# Patient Record
Sex: FEMALE | Race: WHITE | NOT HISPANIC OR LATINO | Employment: OTHER | URBAN - METROPOLITAN AREA
[De-identification: names, ages, dates, MRNs, and addresses within clinical notes are randomized per-mention and may not be internally consistent; named-entity substitution may affect disease eponyms.]

---

## 2023-02-20 LAB
ALANINE AMINOTRANSFERASE (SGPT) (U/L) IN SER/PLAS: 13 U/L (ref 7–45)
ALBUMIN (G/DL) IN SER/PLAS: 4.2 G/DL (ref 3.4–5)
ALKALINE PHOSPHATASE (U/L) IN SER/PLAS: 108 U/L (ref 33–136)
ANION GAP IN SER/PLAS: 11 MMOL/L (ref 10–20)
ASPARTATE AMINOTRANSFERASE (SGOT) (U/L) IN SER/PLAS: 12 U/L (ref 9–39)
BILIRUBIN DIRECT (MG/DL) IN SER/PLAS: 0.1 MG/DL (ref 0–0.3)
BILIRUBIN TOTAL (MG/DL) IN SER/PLAS: 0.5 MG/DL (ref 0–1.2)
CALCIUM (MG/DL) IN SER/PLAS: 9.8 MG/DL (ref 8.6–10.3)
CARBON DIOXIDE, TOTAL (MMOL/L) IN SER/PLAS: 24 MMOL/L (ref 21–32)
CHLORIDE (MMOL/L) IN SER/PLAS: 106 MMOL/L (ref 98–107)
CREATININE (MG/DL) IN SER/PLAS: 2.8 MG/DL (ref 0.5–1.05)
GFR FEMALE: 17 ML/MIN/1.73M2
GLUCOSE (MG/DL) IN SER/PLAS: 136 MG/DL (ref 74–99)
MAGNESIUM (MG/DL) IN SER/PLAS: 1.8 MG/DL (ref 1.6–2.4)
NATRIURETIC PEPTIDE B (PG/ML) IN SER/PLAS: 205 PG/ML (ref 0–99)
POTASSIUM (MMOL/L) IN SER/PLAS: 4.5 MMOL/L (ref 3.5–5.3)
PROTEIN TOTAL: 6.7 G/DL (ref 6.4–8.2)
SODIUM (MMOL/L) IN SER/PLAS: 136 MMOL/L (ref 136–145)
UREA NITROGEN (MG/DL) IN SER/PLAS: 54 MG/DL (ref 6–23)

## 2023-02-21 LAB — COBALAMIN (VITAMIN B12) (PG/ML) IN SER/PLAS: 294 PG/ML (ref 211–911)

## 2023-05-04 PROBLEM — K21.9 GERD (GASTROESOPHAGEAL REFLUX DISEASE): Status: ACTIVE | Noted: 2023-05-04

## 2023-05-04 PROBLEM — R06.02 SOB (SHORTNESS OF BREATH): Status: ACTIVE | Noted: 2023-05-04

## 2023-05-04 PROBLEM — I48.91 A-FIB (MULTI): Status: ACTIVE | Noted: 2023-05-04

## 2023-05-04 PROBLEM — M25.69 BACK STIFFNESS: Status: ACTIVE | Noted: 2023-05-04

## 2023-05-04 PROBLEM — N18.9 CKD (CHRONIC KIDNEY DISEASE): Status: ACTIVE | Noted: 2023-05-04

## 2023-05-04 PROBLEM — I25.10 CORONARY ARTERY DISEASE: Status: ACTIVE | Noted: 2023-05-04

## 2023-05-04 PROBLEM — M54.50 ACUTE EXACERBATION OF CHRONIC LOW BACK PAIN: Status: ACTIVE | Noted: 2023-05-04

## 2023-05-04 PROBLEM — M47.812 CERVICAL SPONDYLOSIS WITHOUT MYELOPATHY: Status: ACTIVE | Noted: 2023-05-04

## 2023-05-04 PROBLEM — D46.9 MYELODYSPLASTIC SYNDROME (MULTI): Status: ACTIVE | Noted: 2023-05-04

## 2023-05-04 PROBLEM — M51.369 DEGENERATION OF INTERVERTEBRAL DISC OF LUMBAR REGION: Status: ACTIVE | Noted: 2023-05-04

## 2023-05-04 PROBLEM — I50.32 CHRONIC DIASTOLIC HEART FAILURE OF UNKNOWN ETIOLOGY (MULTI): Status: ACTIVE | Noted: 2023-05-04

## 2023-05-04 PROBLEM — F41.9 ANXIETY: Status: ACTIVE | Noted: 2023-05-04

## 2023-05-04 PROBLEM — R26.2 INABILITY TO AMBULATE DUE TO MULTIPLE JOINTS: Status: ACTIVE | Noted: 2023-05-04

## 2023-05-04 PROBLEM — M43.26 FUSION OF LUMBAR SPINE: Status: ACTIVE | Noted: 2023-05-04

## 2023-05-04 PROBLEM — R25.1 OCCASIONAL TREMORS: Status: ACTIVE | Noted: 2023-05-04

## 2023-05-04 PROBLEM — E11.9 CONTROLLED TYPE 2 DIABETES MELLITUS (MULTI): Status: ACTIVE | Noted: 2023-05-04

## 2023-05-04 PROBLEM — G89.29 ACUTE EXACERBATION OF CHRONIC LOW BACK PAIN: Status: ACTIVE | Noted: 2023-05-04

## 2023-05-04 PROBLEM — R29.6 FREQUENT FALLS: Status: ACTIVE | Noted: 2023-05-04

## 2023-05-04 PROBLEM — M41.26 OTHER IDIOPATHIC SCOLIOSIS, LUMBAR REGION: Status: ACTIVE | Noted: 2023-05-04

## 2023-05-04 PROBLEM — M47.816 LUMBAR SPONDYLOSIS: Status: ACTIVE | Noted: 2023-05-04

## 2023-05-04 PROBLEM — M79.18 MYOFASCIAL PAIN SYNDROME: Status: ACTIVE | Noted: 2023-05-04

## 2023-05-04 PROBLEM — L97.529 ULCER OF LEFT FOOT (MULTI): Status: ACTIVE | Noted: 2023-05-04

## 2023-05-04 PROBLEM — G45.9 TIA (TRANSIENT ISCHEMIC ATTACK): Status: ACTIVE | Noted: 2023-05-04

## 2023-05-04 PROBLEM — Z86.0100 HISTORY OF COLON POLYPS: Status: ACTIVE | Noted: 2023-05-04

## 2023-05-04 PROBLEM — R25.2 JERKING MOVEMENTS OF EXTREMITIES: Status: ACTIVE | Noted: 2023-05-04

## 2023-05-04 PROBLEM — E78.5 HYPERLIPIDEMIA: Status: ACTIVE | Noted: 2023-05-04

## 2023-05-04 PROBLEM — D64.9 ANEMIA: Status: ACTIVE | Noted: 2023-05-04

## 2023-05-04 PROBLEM — Z86.010 HISTORY OF COLON POLYPS: Status: ACTIVE | Noted: 2023-05-04

## 2023-05-04 PROBLEM — J96.91 RESPIRATORY FAILURE WITH HYPOXIA (MULTI): Status: ACTIVE | Noted: 2023-05-04

## 2023-05-04 PROBLEM — R27.0 ATAXIA: Status: ACTIVE | Noted: 2023-05-04

## 2023-05-04 PROBLEM — I10 ESSENTIAL HYPERTENSION: Status: ACTIVE | Noted: 2023-05-04

## 2023-05-04 PROBLEM — M51.36 DEGENERATION OF INTERVERTEBRAL DISC OF LUMBAR REGION: Status: ACTIVE | Noted: 2023-05-04

## 2023-05-04 PROBLEM — M81.0 OSTEOPOROSIS: Status: ACTIVE | Noted: 2023-05-04

## 2023-05-04 PROBLEM — J30.9 ALLERGIC RHINITIS: Status: ACTIVE | Noted: 2023-05-04

## 2023-05-04 PROBLEM — F39 MOOD DISORDER (CMS-HCC): Status: ACTIVE | Noted: 2023-05-04

## 2023-05-04 PROBLEM — R53.83 FATIGUE: Status: ACTIVE | Noted: 2023-05-04

## 2023-05-04 RX ORDER — BUMETANIDE 1 MG/1
1 TABLET ORAL DAILY
COMMUNITY
Start: 2022-12-15 | End: 2023-05-27 | Stop reason: ALTCHOICE

## 2023-05-04 RX ORDER — ACETAMINOPHEN 500 MG
1 TABLET ORAL EVERY 8 HOURS PRN
COMMUNITY
Start: 2022-06-30 | End: 2024-03-19 | Stop reason: ALTCHOICE

## 2023-05-04 RX ORDER — METOPROLOL TARTRATE 25 MG/1
TABLET, FILM COATED ORAL
COMMUNITY
Start: 2022-04-10 | End: 2024-02-18 | Stop reason: HOSPADM

## 2023-05-04 RX ORDER — GLIMEPIRIDE 4 MG/1
1 TABLET ORAL DAILY
Status: ON HOLD | COMMUNITY
Start: 2019-12-13 | End: 2024-02-08 | Stop reason: ENTERED-IN-ERROR

## 2023-05-04 RX ORDER — PREGABALIN 100 MG/1
1 CAPSULE ORAL 2 TIMES DAILY
COMMUNITY
Start: 2019-12-13 | End: 2023-06-23 | Stop reason: SDUPTHER

## 2023-05-04 RX ORDER — ALBUTEROL SULFATE 90 UG/1
AEROSOL, METERED RESPIRATORY (INHALATION) 4 TIMES DAILY PRN
Status: ON HOLD | COMMUNITY
End: 2024-02-08 | Stop reason: ENTERED-IN-ERROR

## 2023-05-04 RX ORDER — FLUTICASONE PROPIONATE 50 MCG
1 SPRAY, SUSPENSION (ML) NASAL AS NEEDED
COMMUNITY
End: 2024-02-26 | Stop reason: ENTERED-IN-ERROR

## 2023-05-04 RX ORDER — LIDOCAINE 50 MG/G
OINTMENT TOPICAL 2 TIMES DAILY
COMMUNITY
End: 2023-10-10 | Stop reason: ALTCHOICE

## 2023-05-04 RX ORDER — PRAVASTATIN SODIUM 40 MG/1
TABLET ORAL
COMMUNITY
Start: 2019-12-13 | End: 2023-10-01 | Stop reason: SDUPTHER

## 2023-05-04 RX ORDER — FUROSEMIDE 20 MG/1
1 TABLET ORAL
COMMUNITY
Start: 2019-12-13 | End: 2023-05-27 | Stop reason: ALTCHOICE

## 2023-05-04 RX ORDER — ASPIRIN 81 MG/1
1 TABLET ORAL DAILY
COMMUNITY
Start: 2019-12-13 | End: 2024-03-05 | Stop reason: HOSPADM

## 2023-05-04 RX ORDER — PANTOPRAZOLE SODIUM 40 MG/1
1 TABLET, DELAYED RELEASE ORAL DAILY
Status: ON HOLD | COMMUNITY
Start: 2019-12-13 | End: 2024-02-08 | Stop reason: ENTERED-IN-ERROR

## 2023-05-04 RX ORDER — PIOGLITAZONEHYDROCHLORIDE 15 MG/1
1 TABLET ORAL DAILY
COMMUNITY
Start: 2019-12-13 | End: 2023-10-01 | Stop reason: SDUPTHER

## 2023-05-04 RX ORDER — AMLODIPINE BESYLATE 5 MG/1
1 TABLET ORAL DAILY
COMMUNITY
Start: 2022-08-08 | End: 2023-05-25 | Stop reason: ALTCHOICE

## 2023-05-04 RX ORDER — ALLOPURINOL 100 MG/1
TABLET ORAL 2 TIMES DAILY
COMMUNITY
End: 2023-06-23 | Stop reason: SDUPTHER

## 2023-05-04 RX ORDER — CHOLECALCIFEROL (VITAMIN D3) 50 MCG
1 TABLET ORAL DAILY
Status: ON HOLD | COMMUNITY
Start: 2019-12-13

## 2023-05-04 RX ORDER — LORAZEPAM 0.5 MG/1
TABLET ORAL 2 TIMES DAILY
COMMUNITY
Start: 2019-12-13 | End: 2023-05-25 | Stop reason: ALTCHOICE

## 2023-05-19 LAB
ALANINE AMINOTRANSFERASE (SGPT) (U/L) IN SER/PLAS: 11 U/L (ref 7–45)
ALBUMIN (G/DL) IN SER/PLAS: 4.2 G/DL (ref 3.4–5)
ALKALINE PHOSPHATASE (U/L) IN SER/PLAS: 64 U/L (ref 33–136)
ANION GAP IN SER/PLAS: 11 MMOL/L (ref 10–20)
ASPARTATE AMINOTRANSFERASE (SGOT) (U/L) IN SER/PLAS: 14 U/L (ref 9–39)
BILIRUBIN TOTAL (MG/DL) IN SER/PLAS: 0.5 MG/DL (ref 0–1.2)
CALCIUM (MG/DL) IN SER/PLAS: 9.9 MG/DL (ref 8.6–10.3)
CARBON DIOXIDE, TOTAL (MMOL/L) IN SER/PLAS: 23 MMOL/L (ref 21–32)
CHLORIDE (MMOL/L) IN SER/PLAS: 111 MMOL/L (ref 98–107)
CHOLESTEROL (MG/DL) IN SER/PLAS: 117 MG/DL (ref 0–199)
CHOLESTEROL IN HDL (MG/DL) IN SER/PLAS: 45.5 MG/DL
CHOLESTEROL/HDL RATIO: 2.6
CREATININE (MG/DL) IN SER/PLAS: 2.41 MG/DL (ref 0.5–1.05)
GFR FEMALE: 20 ML/MIN/1.73M2
GLUCOSE (MG/DL) IN SER/PLAS: 58 MG/DL (ref 74–99)
LDL: 57 MG/DL (ref 0–99)
POTASSIUM (MMOL/L) IN SER/PLAS: 5.3 MMOL/L (ref 3.5–5.3)
PROTEIN TOTAL: 6.6 G/DL (ref 6.4–8.2)
SODIUM (MMOL/L) IN SER/PLAS: 140 MMOL/L (ref 136–145)
TRIGLYCERIDE (MG/DL) IN SER/PLAS: 75 MG/DL (ref 0–149)
UREA NITROGEN (MG/DL) IN SER/PLAS: 40 MG/DL (ref 6–23)
VLDL: 15 MG/DL (ref 0–40)

## 2023-05-25 ENCOUNTER — TELEPHONE (OUTPATIENT)
Dept: PRIMARY CARE | Facility: CLINIC | Age: 78
End: 2023-05-25

## 2023-05-25 ENCOUNTER — OFFICE VISIT (OUTPATIENT)
Dept: PRIMARY CARE | Facility: CLINIC | Age: 78
End: 2023-05-25
Payer: MEDICARE

## 2023-05-25 VITALS
OXYGEN SATURATION: 100 % | SYSTOLIC BLOOD PRESSURE: 147 MMHG | WEIGHT: 171 LBS | HEART RATE: 66 BPM | DIASTOLIC BLOOD PRESSURE: 72 MMHG | HEIGHT: 61 IN | BODY MASS INDEX: 32.28 KG/M2

## 2023-05-25 DIAGNOSIS — N18.4 STAGE 4 CHRONIC KIDNEY DISEASE (MULTI): ICD-10-CM

## 2023-05-25 DIAGNOSIS — I10 ESSENTIAL HYPERTENSION: ICD-10-CM

## 2023-05-25 DIAGNOSIS — M51.36 DEGENERATION OF INTERVERTEBRAL DISC OF LUMBAR REGION: ICD-10-CM

## 2023-05-25 DIAGNOSIS — R39.15 URINARY URGENCY: Primary | ICD-10-CM

## 2023-05-25 PROBLEM — N17.9 AKI (ACUTE KIDNEY INJURY) (CMS-HCC): Status: ACTIVE | Noted: 2021-03-05

## 2023-05-25 PROBLEM — R53.1 WEAKNESS GENERALIZED: Status: ACTIVE | Noted: 2023-05-04

## 2023-05-25 PROBLEM — R32 URINARY INCONTINENCE: Status: ACTIVE | Noted: 2023-05-25

## 2023-05-25 PROBLEM — M41.56 SCOLIOSIS OF LUMBAR REGION DUE TO DEGENERATIVE DISEASE OF SPINE IN ADULT: Status: ACTIVE | Noted: 2021-03-04

## 2023-05-25 PROBLEM — M10.9 GOUT: Status: ACTIVE | Noted: 2023-05-25

## 2023-05-25 PROBLEM — F32.A DEPRESSION: Status: ACTIVE | Noted: 2023-05-25

## 2023-05-25 PROBLEM — D53.9 MACROCYTIC ANEMIA: Status: ACTIVE | Noted: 2023-05-04

## 2023-05-25 PROBLEM — E11.9 TYPE 2 DIABETES MELLITUS (MULTI): Status: ACTIVE | Noted: 2021-03-05

## 2023-05-25 PROBLEM — E53.8 VITAMIN B12 DEFICIENCY: Status: ACTIVE | Noted: 2023-05-25

## 2023-05-25 PROBLEM — M54.2 NECK PAIN: Status: ACTIVE | Noted: 2023-05-25

## 2023-05-25 PROBLEM — M48.061 SPINAL STENOSIS OF LUMBAR REGION: Status: ACTIVE | Noted: 2021-03-10

## 2023-05-25 LAB
POC APPEARANCE, URINE: CLEAR
POC BILIRUBIN, URINE: ABNORMAL
POC BLOOD, URINE: ABNORMAL
POC COLOR, URINE: YELLOW
POC GLUCOSE, URINE: NEGATIVE MG/DL
POC KETONES, URINE: NEGATIVE MG/DL
POC LEUKOCYTES, URINE: ABNORMAL
POC NITRITE,URINE: POSITIVE
POC PH, URINE: 5.5 PH
POC PROTEIN, URINE: ABNORMAL MG/DL
POC SPECIFIC GRAVITY, URINE: 1.02
POC UROBILINOGEN, URINE: 0.2 EU/DL

## 2023-05-25 PROCEDURE — 3078F DIAST BP <80 MM HG: CPT | Performed by: STUDENT IN AN ORGANIZED HEALTH CARE EDUCATION/TRAINING PROGRAM

## 2023-05-25 PROCEDURE — 99214 OFFICE O/P EST MOD 30 MIN: CPT | Performed by: STUDENT IN AN ORGANIZED HEALTH CARE EDUCATION/TRAINING PROGRAM

## 2023-05-25 PROCEDURE — 87086 URINE CULTURE/COLONY COUNT: CPT

## 2023-05-25 PROCEDURE — 1159F MED LIST DOCD IN RCRD: CPT | Performed by: STUDENT IN AN ORGANIZED HEALTH CARE EDUCATION/TRAINING PROGRAM

## 2023-05-25 PROCEDURE — 87186 SC STD MICRODIL/AGAR DIL: CPT

## 2023-05-25 PROCEDURE — 1160F RVW MEDS BY RX/DR IN RCRD: CPT | Performed by: STUDENT IN AN ORGANIZED HEALTH CARE EDUCATION/TRAINING PROGRAM

## 2023-05-25 PROCEDURE — 3077F SYST BP >= 140 MM HG: CPT | Performed by: STUDENT IN AN ORGANIZED HEALTH CARE EDUCATION/TRAINING PROGRAM

## 2023-05-25 PROCEDURE — 87077 CULTURE AEROBIC IDENTIFY: CPT

## 2023-05-25 PROCEDURE — 81002 URINALYSIS NONAUTO W/O SCOPE: CPT | Performed by: STUDENT IN AN ORGANIZED HEALTH CARE EDUCATION/TRAINING PROGRAM

## 2023-05-25 PROCEDURE — 1036F TOBACCO NON-USER: CPT | Performed by: STUDENT IN AN ORGANIZED HEALTH CARE EDUCATION/TRAINING PROGRAM

## 2023-05-25 PROCEDURE — 1157F ADVNC CARE PLAN IN RCRD: CPT | Performed by: STUDENT IN AN ORGANIZED HEALTH CARE EDUCATION/TRAINING PROGRAM

## 2023-05-25 RX ORDER — CIPROFLOXACIN 500 MG/1
500 TABLET ORAL DAILY
Qty: 3 TABLET | Refills: 0 | Status: SHIPPED | OUTPATIENT
Start: 2023-05-25 | End: 2023-05-28

## 2023-05-25 RX ORDER — LANSOPRAZOLE 30 MG/1
30 CAPSULE, DELAYED RELEASE ORAL DAILY
COMMUNITY
End: 2023-05-27 | Stop reason: ALTCHOICE

## 2023-05-25 RX ORDER — LISINOPRIL 20 MG/1
20 TABLET ORAL
COMMUNITY
End: 2023-05-27 | Stop reason: ALTCHOICE

## 2023-05-25 RX ORDER — HYDROCHLOROTHIAZIDE 25 MG/1
1 TABLET ORAL
COMMUNITY
End: 2023-05-27 | Stop reason: ALTCHOICE

## 2023-05-25 RX ORDER — LANOLIN ALCOHOL/MO/W.PET/CERES
1 CREAM (GRAM) TOPICAL
COMMUNITY
Start: 2017-06-11 | End: 2023-05-27 | Stop reason: ALTCHOICE

## 2023-05-25 RX ORDER — HYDROCHLOROTHIAZIDE 25 MG/1
25 TABLET ORAL DAILY
COMMUNITY
End: 2023-05-25 | Stop reason: ALTCHOICE

## 2023-05-25 RX ORDER — SIMVASTATIN 20 MG/1
20 TABLET, FILM COATED ORAL
COMMUNITY
End: 2023-05-27 | Stop reason: ALTCHOICE

## 2023-05-25 RX ORDER — TRAMADOL HYDROCHLORIDE 50 MG/1
TABLET ORAL 4 TIMES DAILY
COMMUNITY
Start: 2023-02-02 | End: 2023-05-25 | Stop reason: ALTCHOICE

## 2023-05-25 RX ORDER — LISINOPRIL 40 MG/1
40 TABLET ORAL DAILY
COMMUNITY
Start: 2017-04-23 | End: 2023-05-27 | Stop reason: ALTCHOICE

## 2023-05-25 RX ORDER — ERYTHROPOIETIN 10000 [IU]/ML
INJECTION, SOLUTION INTRAVENOUS; SUBCUTANEOUS
COMMUNITY
Start: 2019-12-13 | End: 2024-04-16 | Stop reason: ALTCHOICE

## 2023-05-25 RX ORDER — INSULIN GLARGINE 100 [IU]/ML
6 INJECTION, SOLUTION SUBCUTANEOUS
COMMUNITY
Start: 2021-03-10 | End: 2023-05-25 | Stop reason: ALTCHOICE

## 2023-05-25 RX ORDER — LANOLIN ALCOHOL/MO/W.PET/CERES
1 CREAM (GRAM) TOPICAL DAILY
Status: ON HOLD | COMMUNITY
Start: 2023-02-22

## 2023-05-25 RX ORDER — CARBOXYMETHYLCELLULOSE SODIUM 10 MG/ML
1 GEL OPHTHALMIC
COMMUNITY
End: 2023-05-25 | Stop reason: ALTCHOICE

## 2023-05-25 RX ORDER — AMLODIPINE AND BENAZEPRIL HYDROCHLORIDE 10; 40 MG/1; MG/1
1 CAPSULE ORAL
COMMUNITY
End: 2023-07-20 | Stop reason: ALTCHOICE

## 2023-05-25 SDOH — ECONOMIC STABILITY: FOOD INSECURITY: WITHIN THE PAST 12 MONTHS, THE FOOD YOU BOUGHT JUST DIDN'T LAST AND YOU DIDN'T HAVE MONEY TO GET MORE.: NEVER TRUE

## 2023-05-25 SDOH — ECONOMIC STABILITY: FOOD INSECURITY: WITHIN THE PAST 12 MONTHS, YOU WORRIED THAT YOUR FOOD WOULD RUN OUT BEFORE YOU GOT MONEY TO BUY MORE.: NEVER TRUE

## 2023-05-25 ASSESSMENT — ENCOUNTER SYMPTOMS
DEPRESSION: 1
OCCASIONAL FEELINGS OF UNSTEADINESS: 1
LOSS OF SENSATION IN FEET: 1

## 2023-05-25 ASSESSMENT — PATIENT HEALTH QUESTIONNAIRE - PHQ9
10. IF YOU CHECKED OFF ANY PROBLEMS, HOW DIFFICULT HAVE THESE PROBLEMS MADE IT FOR YOU TO DO YOUR WORK, TAKE CARE OF THINGS AT HOME, OR GET ALONG WITH OTHER PEOPLE: SOMEWHAT DIFFICULT
2. FEELING DOWN, DEPRESSED OR HOPELESS: SEVERAL DAYS
1. LITTLE INTEREST OR PLEASURE IN DOING THINGS: SEVERAL DAYS
SUM OF ALL RESPONSES TO PHQ9 QUESTIONS 1 & 2: 2

## 2023-05-25 ASSESSMENT — LIFESTYLE VARIABLES: HOW MANY STANDARD DRINKS CONTAINING ALCOHOL DO YOU HAVE ON A TYPICAL DAY: PATIENT DOES NOT DRINK

## 2023-05-25 NOTE — PROGRESS NOTES
"Subjective   Patient ID: Tana Hargrove is a 78 y.o. female who presents for New Patient Visit (Former BP pt and saw Dr Pedroza once ), Follow-up (3 month follow up), Pain (PT states she hurts all over, broke her femur about a year ago, feels it never healed.  Excruciating pain.  ), and OTHER (Pt lost , son and son in law all in the last 3 years, has no interest in doing things).      HPI   [facility] and also with few complaints.   Reports she is having some urinary urgency x 2-3 mo now and that she cannot make it on time, having accidents. Denies hematuria, flank pain, fever/chills and other asso sx.   Also having R leg weakness from knee below; started after she broke her femur a yr ago. She follows with ortho, last seen 02/23; and was rec to FU in 3 mo; doesn't have appt yet. Reports otherwise she is doing well; reports UTD on refills for now.     Review of Systems   Constitutional:  Negative for chills, fatigue, fever and unexpected weight change.   HENT: Negative.     Respiratory:  Negative for cough, shortness of breath and wheezing.    Cardiovascular:  Negative for chest pain, palpitations and leg swelling.   Gastrointestinal:  Negative for abdominal pain, constipation, diarrhea, nausea and vomiting.   Genitourinary:  Positive for urgency.   Musculoskeletal: Negative.    Skin:  Negative for color change and rash.   Neurological:  Negative for dizziness and headaches.   Psychiatric/Behavioral:  Negative for behavioral problems and confusion.        Objective   /72 (BP Location: Left arm, Patient Position: Sitting, BP Cuff Size: Large adult)   Pulse 66   Ht 1.549 m (5' 1\")   Wt 77.6 kg (171 lb)   SpO2 100%   BMI 32.31 kg/m²     Physical Exam  Vitals and nursing note reviewed.   Constitutional:       Appearance: Normal appearance. She is obese.   Cardiovascular:      Rate and Rhythm: Normal rate and regular rhythm.      Pulses: Normal pulses.      Heart sounds: Normal heart sounds. "   Pulmonary:      Effort: Pulmonary effort is normal. No respiratory distress.      Breath sounds: Normal breath sounds.   Abdominal:      General: Abdomen is flat. Bowel sounds are normal.      Palpations: Abdomen is soft.   Musculoskeletal:         General: Normal range of motion.   Neurological:      General: No focal deficit present.      Mental Status: She is alert and oriented to person, place, and time.   Psychiatric:         Mood and Affect: Mood normal.         Behavior: Behavior normal.       Assessment/Plan   She is here to [facility] care and also for FU visit. She is having urinary urgency x 1-2 mo and UA io pos for leuks & nitrite, c/f UTI. We will start empiric abx, cipro 500 mg x 3 days (off note: has worsening renal function, current CrCl 23). She is having intermittent/continuous hip pain post fx, re to [facility] as rec. We will help to marita appt; Others UTD on refills, will call for refills as needed. Also we will call her pharmacy to verify meds. Otherwise she is clinically & vitally stable.   Problem List Items Addressed This Visit          Circulatory    Essential hypertension       Genitourinary    Stage 4 chronic kidney disease (CMS/HCC)       Musculoskeletal    Degeneration of intervertebral disc of lumbar region     Other Visit Diagnoses       Urinary urgency    -  Primary    Relevant Medications    ciprofloxacin (Cipro) 500 mg tablet    Other Relevant Orders    POCT UA (nonautomated) manually resulted (Completed)    Urine Culture          Rtc 3 mo for MCR/FU.    Carlos Higgins MD    [facility]

## 2023-05-25 NOTE — TELEPHONE ENCOUNTER
Med list info for next visit:  Dr Richmond and Yovany rx'd:  Allopurinol  Actos  Amaryl  Lyrica  Vit b12  Pantoprozole  Januvia    Dr Callaway:  Metoprolol  Pravastatin  Amoldipine

## 2023-05-27 ASSESSMENT — ENCOUNTER SYMPTOMS
ABDOMINAL PAIN: 0
DIZZINESS: 0
CONSTIPATION: 0
UNEXPECTED WEIGHT CHANGE: 0
DIARRHEA: 0
FATIGUE: 0
COUGH: 0
WHEEZING: 0
FEVER: 0
VOMITING: 0
MUSCULOSKELETAL NEGATIVE: 1
NAUSEA: 0
PALPITATIONS: 0
SHORTNESS OF BREATH: 0
CONFUSION: 0
CHILLS: 0
HEADACHES: 0
COLOR CHANGE: 0

## 2023-05-28 LAB — URINE CULTURE: ABNORMAL

## 2023-06-05 ENCOUNTER — TELEPHONE (OUTPATIENT)
Dept: PRIMARY CARE | Facility: CLINIC | Age: 78
End: 2023-06-05
Payer: MEDICARE

## 2023-06-05 NOTE — TELEPHONE ENCOUNTER
Pt is changing to [facility].  They sent a fax to go over medication.  Pt previously saw you on 05/25/23 and has a follow up on 08/31/23.  The fax is in your box.  Thanks:)

## 2023-06-06 NOTE — TELEPHONE ENCOUNTER
This is a scam.  This is the second time they have requested refills and I have called pt and confirmed.  Do not fill.

## 2023-06-19 ENCOUNTER — TELEPHONE (OUTPATIENT)
Dept: PRIMARY CARE | Facility: CLINIC | Age: 78
End: 2023-06-19
Payer: MEDICARE

## 2023-06-23 DIAGNOSIS — M10.9 GOUT, UNSPECIFIED CAUSE, UNSPECIFIED CHRONICITY, UNSPECIFIED SITE: ICD-10-CM

## 2023-06-23 DIAGNOSIS — M79.18 MYOFASCIAL PAIN SYNDROME: Primary | ICD-10-CM

## 2023-06-23 RX ORDER — DOXYCYCLINE HYCLATE 100 MG
TABLET ORAL
COMMUNITY
Start: 2023-03-31 | End: 2023-07-20 | Stop reason: ALTCHOICE

## 2023-06-23 RX ORDER — MUPIROCIN 20 MG/G
OINTMENT TOPICAL DAILY
COMMUNITY
Start: 2023-06-05 | End: 2023-10-10 | Stop reason: ALTCHOICE

## 2023-06-23 RX ORDER — AMLODIPINE BESYLATE 5 MG/1
1 TABLET ORAL DAILY
Status: ON HOLD | COMMUNITY
Start: 2022-08-08 | End: 2024-05-20 | Stop reason: ENTERED-IN-ERROR

## 2023-06-23 NOTE — TELEPHONE ENCOUNTER
Pt calling in to request a refill on     Gabapentin   Allopurinol   Pregabalin     Pt's pharmacy is [facility]

## 2023-06-26 RX ORDER — PREGABALIN 100 MG/1
100 CAPSULE ORAL 2 TIMES DAILY
Qty: 60 CAPSULE | Refills: 1 | Status: SHIPPED | OUTPATIENT
Start: 2023-06-26 | End: 2023-08-16

## 2023-06-26 RX ORDER — ALLOPURINOL 100 MG/1
100 TABLET ORAL DAILY
Qty: 90 TABLET | Refills: 1 | Status: SHIPPED | OUTPATIENT
Start: 2023-06-26 | End: 2024-05-28 | Stop reason: HOSPADM

## 2023-07-12 ENCOUNTER — TELEPHONE (OUTPATIENT)
Dept: PRIMARY CARE | Facility: CLINIC | Age: 78
End: 2023-07-12
Payer: MEDICARE

## 2023-07-12 NOTE — TELEPHONE ENCOUNTER
[facility] called and said her bloodwork  came back with severely elevated potasium levels and sent in a script for Kayexalate for pt to . They wondered if you wanted to see her soon to check on these.     They are faxing us her labs, and I will scan them in.

## 2023-07-13 NOTE — TELEPHONE ENCOUNTER
She has an appt sched for 8/31. I can move her up to 8/14. Is it acceptable to move her, and for her to wait that long to be seen? Or should I just call Reza arsh back and ask them to continue the treatment?

## 2023-07-17 ENCOUNTER — TELEPHONE (OUTPATIENT)
Dept: PRIMARY CARE | Facility: CLINIC | Age: 78
End: 2023-07-17
Payer: MEDICARE

## 2023-07-17 NOTE — TELEPHONE ENCOUNTER
07/17/2023 05:41 PM EDT by Sue Krueger MA  Outgoing Tana Hargrove (Self) [phone] (Home) Remove   gave the pt the message and she said she understood every word

## 2023-07-20 ENCOUNTER — OFFICE VISIT (OUTPATIENT)
Dept: PRIMARY CARE | Facility: CLINIC | Age: 78
End: 2023-07-20
Payer: MEDICARE

## 2023-07-20 VITALS
TEMPERATURE: 97.1 F | SYSTOLIC BLOOD PRESSURE: 149 MMHG | WEIGHT: 178 LBS | BODY MASS INDEX: 33.63 KG/M2 | DIASTOLIC BLOOD PRESSURE: 68 MMHG | HEART RATE: 87 BPM | OXYGEN SATURATION: 92 %

## 2023-07-20 DIAGNOSIS — M79.89 PAIN AND SWELLING OF LOWER EXTREMITY, RIGHT: Primary | ICD-10-CM

## 2023-07-20 DIAGNOSIS — M79.604 PAIN AND SWELLING OF LOWER EXTREMITY, RIGHT: Primary | ICD-10-CM

## 2023-07-20 DIAGNOSIS — D64.9 ANEMIA, UNSPECIFIED TYPE: ICD-10-CM

## 2023-07-20 DIAGNOSIS — N18.4 STAGE 4 CHRONIC KIDNEY DISEASE (MULTI): ICD-10-CM

## 2023-07-20 DIAGNOSIS — R06.02 SOB (SHORTNESS OF BREATH): ICD-10-CM

## 2023-07-20 DIAGNOSIS — E11.9 TYPE 2 DIABETES MELLITUS WITHOUT COMPLICATION, WITHOUT LONG-TERM CURRENT USE OF INSULIN (MULTI): ICD-10-CM

## 2023-07-20 PROCEDURE — 1160F RVW MEDS BY RX/DR IN RCRD: CPT | Performed by: STUDENT IN AN ORGANIZED HEALTH CARE EDUCATION/TRAINING PROGRAM

## 2023-07-20 PROCEDURE — 3077F SYST BP >= 140 MM HG: CPT | Performed by: STUDENT IN AN ORGANIZED HEALTH CARE EDUCATION/TRAINING PROGRAM

## 2023-07-20 PROCEDURE — 3078F DIAST BP <80 MM HG: CPT | Performed by: STUDENT IN AN ORGANIZED HEALTH CARE EDUCATION/TRAINING PROGRAM

## 2023-07-20 PROCEDURE — 99215 OFFICE O/P EST HI 40 MIN: CPT | Performed by: STUDENT IN AN ORGANIZED HEALTH CARE EDUCATION/TRAINING PROGRAM

## 2023-07-20 PROCEDURE — 1157F ADVNC CARE PLAN IN RCRD: CPT | Performed by: STUDENT IN AN ORGANIZED HEALTH CARE EDUCATION/TRAINING PROGRAM

## 2023-07-20 PROCEDURE — 1036F TOBACCO NON-USER: CPT | Performed by: STUDENT IN AN ORGANIZED HEALTH CARE EDUCATION/TRAINING PROGRAM

## 2023-07-20 PROCEDURE — 1159F MED LIST DOCD IN RCRD: CPT | Performed by: STUDENT IN AN ORGANIZED HEALTH CARE EDUCATION/TRAINING PROGRAM

## 2023-07-20 ASSESSMENT — ENCOUNTER SYMPTOMS
MUSCULOSKELETAL NEGATIVE: 1
FATIGUE: 0
WHEEZING: 0
CONFUSION: 0
ABDOMINAL PAIN: 0
COLOR CHANGE: 0
PALPITATIONS: 0
HEADACHES: 0
UNEXPECTED WEIGHT CHANGE: 0
COUGH: 0
NAUSEA: 0
DIARRHEA: 0
SHORTNESS OF BREATH: 0
CONSTIPATION: 0
VOMITING: 0
FEVER: 0
DIZZINESS: 0
CHILLS: 0

## 2023-07-20 NOTE — PROGRESS NOTES
Subjective   Patient ID: Tana Hargrove is a 78 y.o. female who presents for Foot Swelling (Pt is here for both feet swelling, and thinks pretty much all over her body. The swelling started yesterday.).    HPI   She is here for FU visit and also c/o leg swelling. Reports she is having leg swelling/pain, R >> L leg  x 1 day. D-in law in the room mentioned its affecting her ability to walk. Off note She has low Hgb, 6.7 ()7/7/23) s/p 2 units of pRBCs. Reports her energy level is about the same. She denies CP/SOB, HA, palpitations, cough, lightheadedness and other asso sx.     Review of Systems   Constitutional:  Negative for chills, fatigue, fever and unexpected weight change.   HENT: Negative.     Respiratory:  Negative for cough, shortness of breath and wheezing.    Cardiovascular:  Negative for chest pain, palpitations and leg swelling.   Gastrointestinal:  Negative for abdominal pain, constipation, diarrhea, nausea and vomiting.   Musculoskeletal: Negative.    Skin:  Negative for color change and rash.   Neurological:  Negative for dizziness and headaches.   Psychiatric/Behavioral:  Negative for behavioral problems and confusion.        Objective   /68 (BP Location: Left arm, Patient Position: Sitting)   Pulse 87   Temp 36.2 °C (97.1 °F)   Wt 80.7 kg (178 lb)   SpO2 92%   BMI 33.63 kg/m²     Physical Exam  Vitals and nursing note reviewed.   Constitutional:       Appearance: Normal appearance.   Eyes:      Extraocular Movements: Extraocular movements intact.      Pupils: Pupils are equal, round, and reactive to light.   Cardiovascular:      Rate and Rhythm: Normal rate and regular rhythm.      Pulses: Normal pulses.      Heart sounds: Normal heart sounds.   Pulmonary:      Effort: Pulmonary effort is normal. No respiratory distress.      Breath sounds: Normal breath sounds.   Abdominal:      General: Abdomen is flat. Bowel sounds are normal.      Palpations: Abdomen is soft.   Musculoskeletal:          General: Tenderness present. Normal range of motion.      Right lower leg: Edema present.      Left lower leg: Edema present.      Comments: R LE: significantly swollen & ttp at the calf area; L LE: trace edema but not ttp; ambulates with sl limping.    Neurological:      General: No focal deficit present.      Mental Status: She is alert and oriented to person, place, and time.   Psychiatric:         Mood and Affect: Mood normal.         Behavior: Behavior normal.       Assessment/Plan   She is here for FU visit and also c/o leg swelling. She is having bl LE swelling but R>> L leg and also ttp, little c/f DVT vs other as worsening of RF vs med induced vs other. We will obtain STAT duplex US for R LE to eval further; also obtain BW as CMP, BNP and CBC to monitor her Hgb post transfusion. Adv to seek ER care if sx worsens.  Otherwise clinically stable.   Problem List Items Addressed This Visit       Type 2 diabetes mellitus (CMS/HCC)    Relevant Orders    Hemoglobin A1c    SOB (shortness of breath)    Relevant Orders    B-type natriuretic peptide    Stage 4 chronic kidney disease (CMS/HCC)    Relevant Orders    CBC and Auto Differential    Comprehensive metabolic panel     Other Visit Diagnoses       Pain and swelling of lower extremity, right    -  Primary    Relevant Orders    Vascular US lower extremity venous duplex right    B-type natriuretic peptide    Anemia, unspecified type        Relevant Orders    CBC and Auto Differential          RTC as schd.    Carlos Higgins MD   [facility]

## 2023-07-28 PROBLEM — E11.65 TYPE 2 DIABETES MELLITUS WITH HYPERGLYCEMIA, WITHOUT LONG-TERM CURRENT USE OF INSULIN (MULTI): Status: ACTIVE | Noted: 2021-03-05

## 2023-07-28 PROBLEM — R60.0 LOWER EXTREMITY EDEMA: Status: ACTIVE | Noted: 2023-07-28

## 2023-07-28 PROBLEM — I74.9 EMBOLISM (MULTI): Status: ACTIVE | Noted: 2023-07-28

## 2023-07-28 PROBLEM — I50.9 CONGESTIVE HEART FAILURE (MULTI): Status: ACTIVE | Noted: 2023-07-28

## 2023-08-23 ENCOUNTER — NURSING HOME VISIT (OUTPATIENT)
Dept: POST ACUTE CARE | Facility: EXTERNAL LOCATION | Age: 78
End: 2023-08-23
Payer: MEDICARE

## 2023-08-23 DIAGNOSIS — E11.22 TYPE 2 DIABETES MELLITUS WITH STAGE 4 CHRONIC KIDNEY DISEASE, WITHOUT LONG-TERM CURRENT USE OF INSULIN (MULTI): ICD-10-CM

## 2023-08-23 DIAGNOSIS — R53.1 WEAKNESS GENERALIZED: ICD-10-CM

## 2023-08-23 DIAGNOSIS — J44.9 COPD WITHOUT EXACERBATION (MULTI): ICD-10-CM

## 2023-08-23 DIAGNOSIS — M17.11 PRIMARY OSTEOARTHRITIS OF RIGHT KNEE: ICD-10-CM

## 2023-08-23 DIAGNOSIS — N18.4 TYPE 2 DIABETES MELLITUS WITH STAGE 4 CHRONIC KIDNEY DISEASE, WITHOUT LONG-TERM CURRENT USE OF INSULIN (MULTI): ICD-10-CM

## 2023-08-23 DIAGNOSIS — I50.32 CHRONIC DIASTOLIC HEART FAILURE OF UNKNOWN ETIOLOGY (MULTI): ICD-10-CM

## 2023-08-23 DIAGNOSIS — I48.91 ATRIAL FIBRILLATION, UNSPECIFIED TYPE (MULTI): ICD-10-CM

## 2023-08-23 DIAGNOSIS — I10 HYPERTENSION, ESSENTIAL: ICD-10-CM

## 2023-08-23 DIAGNOSIS — R29.6 FREQUENT FALLS: Primary | ICD-10-CM

## 2023-08-23 PROCEDURE — 99309 SBSQ NF CARE MODERATE MDM 30: CPT | Performed by: NURSE PRACTITIONER

## 2023-08-23 NOTE — LETTER
Patient: Tana Hargrove  : 1945    Encounter Date: 2023    PROGRESS NOTE    Subjective  Chief complaint: Tana Hargrove is a 78 y.o. female who is an acute skilled patient being seen and evaluated for weakness    HPI:  Patient admitted to skilled nursing facility for therapy due to weakness after recent hospitalization status post fall at home.  She has complaint of chronic right knee pain with imaging significant for osteoarthritis at hospital.  Patient also noted to have anemia and was given blood transfusion.  She is admitted to skilled nursing facility for therapy due to acute on chronic weakness.      Objective  Vital signs: 132/67, 18, 98.0, 55, 96%, blood sugar 143    Physical Exam  Constitutional:       General: She is not in acute distress.  Eyes:      Extraocular Movements: Extraocular movements intact.   Cardiovascular:      Rate and Rhythm: Normal rate and regular rhythm.   Pulmonary:      Effort: Pulmonary effort is normal.      Breath sounds: Normal breath sounds.   Abdominal:      General: Bowel sounds are normal.      Palpations: Abdomen is soft.   Musculoskeletal:      Cervical back: Neck supple.      Right lower leg: No edema.      Left lower leg: No edema.      Comments: Generalized weakness  Right knee tender to palpation   Neurological:      Mental Status: She is alert.   Psychiatric:         Mood and Affect: Mood normal.         Behavior: Behavior is cooperative.         Assessment/Plan  Problem List Items Addressed This Visit       A-fib (CMS/HCC)     HR controlled  BB         Chronic diastolic heart failure of unknown etiology (CMS/HCC)     Stable  BB  Diuretic  Monitor weight         COPD without exacerbation (CMS/HCC)     Stable  On RA         Frequent falls - Primary     Therapy         Hypertension, essential     Blood pressure at goal  Continue antihypertensives  Monitor blood pressure         Primary osteoarthritis of right knee     Pain meds  Therapy         Type 2 diabetes  mellitus with stage 4 chronic kidney disease, without long-term current use of insulin (CMS/[facility])     Blood glucose at goal  Amaryl  Janselene  Monitor Glucoscan         Weakness generalized     Therapy to eval and treat          Medications, treatments, and labs reviewed  Continue medications and treatments as listed in EMR    TIKA Huddleston      Electronically Signed By: TIKA Huddleston   9/2/23  4:32 PM

## 2023-08-24 ENCOUNTER — NURSING HOME VISIT (OUTPATIENT)
Dept: POST ACUTE CARE | Facility: EXTERNAL LOCATION | Age: 78
End: 2023-08-24
Payer: MEDICARE

## 2023-08-24 DIAGNOSIS — I50.32 CHRONIC DIASTOLIC HEART FAILURE OF UNKNOWN ETIOLOGY (MULTI): ICD-10-CM

## 2023-08-24 DIAGNOSIS — I10 HYPERTENSION, ESSENTIAL: ICD-10-CM

## 2023-08-24 DIAGNOSIS — R53.1 WEAKNESS GENERALIZED: Primary | ICD-10-CM

## 2023-08-24 DIAGNOSIS — N18.4 TYPE 2 DIABETES MELLITUS WITH STAGE 4 CHRONIC KIDNEY DISEASE, WITHOUT LONG-TERM CURRENT USE OF INSULIN (MULTI): ICD-10-CM

## 2023-08-24 DIAGNOSIS — I48.91 ATRIAL FIBRILLATION, UNSPECIFIED TYPE (MULTI): ICD-10-CM

## 2023-08-24 DIAGNOSIS — E11.22 TYPE 2 DIABETES MELLITUS WITH STAGE 4 CHRONIC KIDNEY DISEASE, WITHOUT LONG-TERM CURRENT USE OF INSULIN (MULTI): ICD-10-CM

## 2023-08-24 PROCEDURE — 99309 SBSQ NF CARE MODERATE MDM 30: CPT | Performed by: NURSE PRACTITIONER

## 2023-08-24 NOTE — LETTER
Patient: Tana Hargrove  : 1945    Encounter Date: 2023    PROGRESS NOTE    Subjective  Chief complaint: Tana Hargrove is a 78 y.o. female who is an acute skilled patient being seen and evaluated for weakness    HPI:  No new concerns today.  Uneventful night.  Denies constitutional symptoms.  Actively participating in therapy and continues working toward goals.      Objective  Vital signs: 132/67,     Physical Exam  Constitutional:       General: She is not in acute distress.  Cardiovascular:      Rate and Rhythm: Normal rate and regular rhythm.   Pulmonary:      Effort: Pulmonary effort is normal.      Breath sounds: Normal breath sounds.   Musculoskeletal:      Cervical back: Neck supple.      Right lower leg: No edema.      Left lower leg: No edema.      Comments: Generalized weakness   Neurological:      Mental Status: She is alert.   Psychiatric:         Behavior: Behavior is cooperative.         Assessment/Plan  Problem List Items Addressed This Visit       A-fib (CMS/[facility])     BB         Chronic diastolic heart failure of unknown etiology (CMS/[facility])     Stable  BB  Diuretic  Monitor weight         Hypertension, essential     Blood pressure at goal  Continue antihypertensives  Monitor blood pressure         Type 2 diabetes mellitus with stage 4 chronic kidney disease, without long-term current use of insulin (CMS/[facility])     Amaryl  Januvia  Monitor Glucoscan         Weakness generalized - Primary     Continue working with therapy           Medications, treatments, and labs reviewed  Continue medications and treatments as listed in EMR    TIKA Huddleston      Electronically Signed By: TIKA Huddleston   23  6:20 PM

## 2023-08-25 ENCOUNTER — NURSING HOME VISIT (OUTPATIENT)
Dept: POST ACUTE CARE | Facility: EXTERNAL LOCATION | Age: 78
End: 2023-08-25
Payer: MEDICARE

## 2023-08-25 DIAGNOSIS — N17.9 AKI (ACUTE KIDNEY INJURY) (CMS-HCC): ICD-10-CM

## 2023-08-25 DIAGNOSIS — I48.91 ATRIAL FIBRILLATION, UNSPECIFIED TYPE (MULTI): Primary | ICD-10-CM

## 2023-08-25 DIAGNOSIS — R29.6 FREQUENT FALLS: ICD-10-CM

## 2023-08-25 DIAGNOSIS — R26.2 INABILITY TO AMBULATE DUE TO MULTIPLE JOINTS: ICD-10-CM

## 2023-08-25 DIAGNOSIS — E78.5 HYPERLIPIDEMIA, UNSPECIFIED HYPERLIPIDEMIA TYPE: ICD-10-CM

## 2023-08-25 DIAGNOSIS — I50.40 COMBINED SYSTOLIC AND DIASTOLIC CONGESTIVE HEART FAILURE, UNSPECIFIED HF CHRONICITY (MULTI): ICD-10-CM

## 2023-08-25 DIAGNOSIS — I74.9 EMBOLISM (MULTI): ICD-10-CM

## 2023-08-25 DIAGNOSIS — I50.32 CHRONIC DIASTOLIC HEART FAILURE OF UNKNOWN ETIOLOGY (MULTI): ICD-10-CM

## 2023-08-25 DIAGNOSIS — M47.812 CERVICAL SPONDYLOSIS WITHOUT MYELOPATHY: ICD-10-CM

## 2023-08-25 DIAGNOSIS — R27.0 ATAXIA: ICD-10-CM

## 2023-08-25 PROCEDURE — 99305 1ST NF CARE MODERATE MDM 35: CPT | Performed by: INTERNAL MEDICINE

## 2023-08-25 NOTE — PROGRESS NOTES
HISTORY & PHYSICAL    Subjective   Chief complaint: Tana Hargrove is a 78 y.o. female who is a acute skilled care patient being seen and evaluated for multiple medical problems.  Patient presents for weakness.    HPI:  Patient was admitted to skilled nursing facility after being in the hospital for acute kidney failure. Patient was evaluated and monitored. Patient was given IV fluid Patient also has CKD As a baselineand is on a high dose of Procrit weekly. Patient was evaluated by PT and OT and was discharged to SNF.        Past Medical History:   Diagnosis Date    Abnormal levels of other serum enzymes     Elevated liver enzymes    Acute kidney failure (CMS/HCC)     Anemia     CKD (chronic kidney disease)     COPD (chronic obstructive pulmonary disease) (CMS/HCC)     Disease of blood and blood-forming organs, unspecified     Bone marrow disorder    HLD (hyperlipidemia)     Personal history of other diseases of the musculoskeletal system and connective tissue     History of muscle pain    Personal history of other specified conditions     History of insomnia    Personal history of other specified conditions     History of edema    Type 2 diabetes mellitus (CMS/HCC)        Past Surgical History:   Procedure Laterality Date    MR HEAD ANGIO WO IV CONTRAST  11/20/2020    MR HEAD ANGIO WO IV CONTRAST 11/20/2020 [facility]ICAL LEGAC[facility]    MR NECK ANGIO WO IV CONTRAST  11/20/2020    MR NECK ANGIO WO IV CONTRAST 11/20/2020 [facility] CLINICAL LEGAC[facility]    OTHER SURGICAL HISTORY  12/13/2019    Oophorectomy bilateral    OTHER SURGICAL HISTORY  12/13/2019    Tubal ligation    OTHER SURGICAL HISTORY  12/13/2019    Knee replacement    OTHER SURGICAL HISTORY  12/13/2019    Shoulder surgery    OTHER SURGICAL HISTORY  12/13/2019    Hysterectomy    OTHER SURGICAL HISTORY  12/13/2019    Lumpectomy    OTHER SURGICAL HISTORY  12/13/2019    Foot surgery    OTHER SURGICAL HISTORY  04/16/2021    Back surgery       No family history on  file.    Social History     Socioeconomic History    Marital status:      Spouse name: Not on file    Number of children: Not on file    Years of education: Not on file    Highest education level: Not on file   Occupational History    Not on file   Tobacco Use    Smoking status: Never    Smokeless tobacco: Never   Vaping Use    Vaping Use: Never used   Substance and Sexual Activity    Alcohol use: Not Currently    Drug use: Not Currently    Sexual activity: Not on file   Other Topics Concern    Not on file   Social History Narrative    Not on file     Social Determinants of Health     Financial Resource Strain: Not on file   Food Insecurity: No Food Insecurity (5/25/2023)    Hunger Vital Sign     Worried About Running Out of Food in the Last Year: Never true     Ran Out of Food in the Last Year: Never true   Transportation Needs: Not on file   Physical Activity: Not on file   Stress: Not on file   Social Connections: Not on file   Intimate Partner Violence: Not on file   Housing Stability: Not on file       Vital signs: 134/67, 97.8, 64, 18, 154.0, 99%    Objective   Physical Exam    Assessment/Plan   Problem List Items Addressed This Visit       A-fib (CMS/[facility]) - Primary    Ataxia    Chronic diastolic heart failure of unknown etiology (CMS/HCC)    Cervical spondylosis without myelopathy    Frequent falls    Hyperlipidemia    Inability to ambulate due to multiple joints    ESTEE (acute kidney injury) (CMS/HCC)    Congestive heart failure (CMS/HCC)    Embolism (CMS/[facility])     Medications, treatments, and labs reviewed  Continue medications and treatments as listed in PCC  PT OT  Scribe Attestation  I, Lico Lockhart   attest that this documentation has been prepared under the direction and in the presence of Heather Mcdonald MD.    Provider Attestation - Scribe documentation  All medical record entries made by the Scribe were at my direction and personally dictated by me. I have reviewed the chart and agree  that the record accurately reflects my personal performance of the history, physical exam, discussion and plan.    Heather Mcdonald MD

## 2023-08-25 NOTE — LETTER
Patient: Tana Hargrove  : 1945    Encounter Date: 2023    HISTORY & PHYSICAL    Subjective  Chief complaint: Tana Hargrove is a 78 y.o. female who is a acute skilled care patient being seen and evaluated for multiple medical problems.  Patient presents for weakness.    HPI:  Patient was admitted to skilled nursing facility after being in the hospital for acute kidney failure. Patient was evaluated and monitored. Patient was given IV fluid Patient also has CKD As a baselineand is on a high dose of Procrit weekly. Patient was evaluated by PT and OT and was discharged to SNF.        Past Medical History:   Diagnosis Date   • Abnormal levels of other serum enzymes     Elevated liver enzymes   • Acute kidney failure (CMS/HCC)    • Anemia    • CKD (chronic kidney disease)    • COPD (chronic obstructive pulmonary disease) (CMS/HCC)    • Disease of blood and blood-forming organs, unspecified     Bone marrow disorder   • HLD (hyperlipidemia)    • Personal history of other diseases of the musculoskeletal system and connective tissue     History of muscle pain   • Personal history of other specified conditions     History of insomnia   • Personal history of other specified conditions     History of edema   • Type 2 diabetes mellitus (CMS/HCC)        Past Surgical History:   Procedure Laterality Date   • MR HEAD ANGIO WO IV CONTRAST  2020    MR HEAD ANGIO WO IV CONTRAST 2020 [facility] CLINICAL LEGACY   • MR NECK ANGIO WO IV CONTRAST  2020    MR NECK ANGIO WO IV CONTRAST 2020 [facility] CLINICAL LEGACY   • OTHER SURGICAL HISTORY  2019    Oophorectomy bilateral   • OTHER SURGICAL HISTORY  2019    Tubal ligation   • OTHER SURGICAL HISTORY  2019    Knee replacement   • OTHER SURGICAL HISTORY  2019    Shoulder surgery   • OTHER SURGICAL HISTORY  2019    Hysterectomy   • OTHER SURGICAL HISTORY  2019    Lumpectomy   • OTHER SURGICAL HISTORY  2019    Foot surgery   •  OTHER SURGICAL HISTORY  04/16/2021    Back surgery       No family history on file.    Social History     Socioeconomic History   • Marital status:      Spouse name: Not on file   • Number of children: Not on file   • Years of education: Not on file   • Highest education level: Not on file   Occupational History   • Not on file   Tobacco Use   • Smoking status: Never   • Smokeless tobacco: Never   Vaping Use   • Vaping Use: Never used   Substance and Sexual Activity   • Alcohol use: Not Currently   • Drug use: Not Currently   • Sexual activity: Not on file   Other Topics Concern   • Not on file   Social History Narrative   • Not on file     Social Determinants of Health     Financial Resource Strain: Not on file   Food Insecurity: No Food Insecurity (5/25/2023)    Hunger Vital Sign    • Worried About Running Out of Food in the Last Year: Never true    • Ran Out of Food in the Last Year: Never true   Transportation Needs: Not on file   Physical Activity: Not on file   Stress: Not on file   Social Connections: Not on file   Intimate Partner Violence: Not on file   Housing Stability: Not on file       Vital signs: 134/67, 97.8, 64, 18, 154.0, 99%    Objective  Physical Exam    Assessment/Plan  Problem List Items Addressed This Visit       A-fib (CMS/HCC) - Primary    Ataxia    Chronic diastolic heart failure of unknown etiology (CMS/HCC)    Cervical spondylosis without myelopathy    Frequent falls    Hyperlipidemia    Inability to ambulate due to multiple joints    ESTEE (acute kidney injury) (CMS/HCC)    Congestive heart failure (CMS/HCC)    Embolism (CMS/HCC)     Medications, treatments, and labs reviewed  Continue medications and treatments as listed in PCC  PT OT  Scribe Attestation  I, Lico Lockhart   attest that this documentation has been prepared under the direction and in the presence of Heather Mcdonald MD.    Provider Attestation - Scribe documentation  All medical record entries made by the  Scribe were at my direction and personally dictated by me. I have reviewed the chart and agree that the record accurately reflects my personal performance of the history, physical exam, discussion and plan.    Heather Mcdonald MD          Electronically Signed By: Heather Mcdonald MD   8/25/23  2:41 PM

## 2023-08-28 ENCOUNTER — DOCUMENTATION (OUTPATIENT)
Dept: POST ACUTE CARE | Facility: EXTERNAL LOCATION | Age: 78
End: 2023-08-28
Payer: MEDICARE

## 2023-08-28 ENCOUNTER — NURSING HOME VISIT (OUTPATIENT)
Dept: POST ACUTE CARE | Facility: EXTERNAL LOCATION | Age: 78
End: 2023-08-28
Payer: MEDICARE

## 2023-08-28 DIAGNOSIS — I50.32 CHRONIC DIASTOLIC HEART FAILURE OF UNKNOWN ETIOLOGY (MULTI): ICD-10-CM

## 2023-08-28 DIAGNOSIS — I48.91 ATRIAL FIBRILLATION, UNSPECIFIED TYPE (MULTI): ICD-10-CM

## 2023-08-28 DIAGNOSIS — N18.4 TYPE 2 DIABETES MELLITUS WITH STAGE 4 CHRONIC KIDNEY DISEASE, WITHOUT LONG-TERM CURRENT USE OF INSULIN (MULTI): ICD-10-CM

## 2023-08-28 DIAGNOSIS — J44.9 COPD WITHOUT EXACERBATION (MULTI): ICD-10-CM

## 2023-08-28 DIAGNOSIS — R53.1 WEAKNESS GENERALIZED: Primary | ICD-10-CM

## 2023-08-28 DIAGNOSIS — E11.22 TYPE 2 DIABETES MELLITUS WITH STAGE 4 CHRONIC KIDNEY DISEASE, WITHOUT LONG-TERM CURRENT USE OF INSULIN (MULTI): ICD-10-CM

## 2023-08-28 DIAGNOSIS — I10 HYPERTENSION, ESSENTIAL: ICD-10-CM

## 2023-08-28 PROCEDURE — 99309 SBSQ NF CARE MODERATE MDM 30: CPT | Performed by: NURSE PRACTITIONER

## 2023-08-28 NOTE — LETTER
Patient: Tana Hargrove  : 1945    Encounter Date: 2023    PROGRESS NOTE    Subjective  Chief complaint: Tana Hargrove is a 78 y.o. female who is an acute skilled patient being seen and evaluated for weakness    HPI:  Therapy update:  2023  Ambulation - // SBA/CGA  Transfers - CGA/MIN  Stairs - n/a  ST - n/a    Patient is working with therapy dt weakness and continues working toward goals.  She requires wheelchair for mobility and is working on ambulation at parallel bars.  Patient requires contact guard to minimal assist for transfers.   She has no new concerns today.  States she is feeling fine.  No new concerns per nursing staff.      Objective  Vital signs: 128/64, 77, bs 160    Physical Exam  Constitutional:       General: She is not in acute distress.  Cardiovascular:      Rate and Rhythm: Normal rate and regular rhythm.   Pulmonary:      Effort: Pulmonary effort is normal.      Breath sounds: Normal breath sounds.   Musculoskeletal:      Cervical back: Neck supple.      Right lower leg: No edema.      Left lower leg: No edema.      Comments: Generalized weakness   Neurological:      Mental Status: She is alert.   Psychiatric:         Behavior: Behavior is cooperative.         Assessment/Plan  Problem List Items Addressed This Visit       A-fib (CMS/HCC)     BB  HR controlled         Chronic diastolic heart failure of unknown etiology (CMS/HCC)     Stable  BB  Diuretic  Monitor weight         COPD without exacerbation (CMS/HCC)     Stable  On RA         Hypertension, essential     Blood pressure at goal  Continue antihypertensives  Monitor blood pressure         Type 2 diabetes mellitus with stage 4 chronic kidney disease, without long-term current use of insulin (CMS/HCC)     FBG at goal  Amaryl  Januvia  Monitor Glucoscan         Weakness generalized - Primary     Continue working with therapy, actively participating          Medications, treatments, and labs reviewed  Continue medications  and treatments as listed in EMR    TIKA Huddleston      Electronically Signed By: TIKA Huddleston   9/4/23  1:50 PM

## 2023-08-29 ENCOUNTER — NURSING HOME VISIT (OUTPATIENT)
Dept: POST ACUTE CARE | Facility: EXTERNAL LOCATION | Age: 78
End: 2023-08-29
Payer: MEDICARE

## 2023-08-29 DIAGNOSIS — R53.1 WEAKNESS GENERALIZED: Primary | ICD-10-CM

## 2023-08-29 DIAGNOSIS — I10 ESSENTIAL HYPERTENSION: ICD-10-CM

## 2023-08-29 PROCEDURE — 99308 SBSQ NF CARE LOW MDM 20: CPT | Performed by: INTERNAL MEDICINE

## 2023-08-29 NOTE — LETTER
Patient: Tana Hargrove  : 1945    Encounter Date: 2023    PROGRESS NOTE    Subjective  Chief complaint: Tana Hargrove is a 78 y.o. female who is an acute skilled patient being seen and evaluated for weakness    HPI:   patient recently admitted to nursing facility is working in therapy.  Patient requires assistance for transfers ADLs and mobility.  No new issues or concerns.  No acute distress.  denies chest pain or headache.      Objective  Vital signs:   124/76, 98%    Physical Exam  Constitutional:       General: She is not in acute distress.  Eyes:      Extraocular Movements: Extraocular movements intact.   Cardiovascular:      Rate and Rhythm: Normal rate and regular rhythm.   Pulmonary:      Effort: Pulmonary effort is normal.      Breath sounds: Normal breath sounds.   Abdominal:      General: Bowel sounds are normal.      Palpations: Abdomen is soft.   Musculoskeletal:      Cervical back: Neck supple.      Right lower leg: No edema.      Left lower leg: No edema.   Neurological:      Mental Status: She is alert.   Psychiatric:         Mood and Affect: Mood normal.         Behavior: Behavior is cooperative.         Assessment/Plan  Problem List Items Addressed This Visit       Essential hypertension      continue current meds  Monitor BP         Weakness generalized - Primary       Continue therapy          Medications, treatments, and labs reviewed  Continue medications and treatments as listed in [facility]    Scribe Attestation  By signing my name below, I, Lico Tolliver   attest that this documentation has been prepared under the direction and in the presence of Heather Mcdonald MD.    Provider Attestation - Scribe documentation  All medical record entries made by the Scribe were at my direction and personally dictated by me. I have reviewed the chart and agree that the record accurately reflects my personal performance of the history, physical exam, discussion and plan.  1. Weakness  generalized        2. Essential hypertension              Electronically Signed By: Heather Mcdonald MD   8/30/23  7:19 PM

## 2023-08-30 ENCOUNTER — NURSING HOME VISIT (OUTPATIENT)
Dept: POST ACUTE CARE | Facility: EXTERNAL LOCATION | Age: 78
End: 2023-08-30
Payer: MEDICARE

## 2023-08-30 ENCOUNTER — DOCUMENTATION (OUTPATIENT)
Dept: POST ACUTE CARE | Facility: EXTERNAL LOCATION | Age: 78
End: 2023-08-30
Payer: MEDICARE

## 2023-08-30 DIAGNOSIS — I48.91 ATRIAL FIBRILLATION, UNSPECIFIED TYPE (MULTI): ICD-10-CM

## 2023-08-30 DIAGNOSIS — J44.9 COPD WITHOUT EXACERBATION (MULTI): ICD-10-CM

## 2023-08-30 DIAGNOSIS — E11.22 TYPE 2 DIABETES MELLITUS WITH STAGE 4 CHRONIC KIDNEY DISEASE, WITHOUT LONG-TERM CURRENT USE OF INSULIN (MULTI): ICD-10-CM

## 2023-08-30 DIAGNOSIS — I50.32 CHRONIC DIASTOLIC HEART FAILURE OF UNKNOWN ETIOLOGY (MULTI): ICD-10-CM

## 2023-08-30 DIAGNOSIS — I10 HYPERTENSION, ESSENTIAL: ICD-10-CM

## 2023-08-30 DIAGNOSIS — R53.1 WEAKNESS GENERALIZED: Primary | ICD-10-CM

## 2023-08-30 DIAGNOSIS — N18.4 TYPE 2 DIABETES MELLITUS WITH STAGE 4 CHRONIC KIDNEY DISEASE, WITHOUT LONG-TERM CURRENT USE OF INSULIN (MULTI): ICD-10-CM

## 2023-08-30 PROCEDURE — 99309 SBSQ NF CARE MODERATE MDM 30: CPT | Performed by: NURSE PRACTITIONER

## 2023-08-30 NOTE — PROGRESS NOTES
PROGRESS NOTE    Subjective   Chief complaint: Tana Hargrove is a 78 y.o. female who is an acute skilled patient being seen and evaluated for weakness    HPI:   patient recently admitted to nursing facility is working in therapy.  Patient requires assistance for transfers ADLs and mobility.  No new issues or concerns.  No acute distress.  denies chest pain or headache.      Objective   Vital signs:   124/76, 98%    Physical Exam  Constitutional:       General: She is not in acute distress.  Eyes:      Extraocular Movements: Extraocular movements intact.   Cardiovascular:      Rate and Rhythm: Normal rate and regular rhythm.   Pulmonary:      Effort: Pulmonary effort is normal.      Breath sounds: Normal breath sounds.   Abdominal:      General: Bowel sounds are normal.      Palpations: Abdomen is soft.   Musculoskeletal:      Cervical back: Neck supple.      Right lower leg: No edema.      Left lower leg: No edema.   Neurological:      Mental Status: She is alert.   Psychiatric:         Mood and Affect: Mood normal.         Behavior: Behavior is cooperative.         Assessment/Plan   Problem List Items Addressed This Visit       Essential hypertension      continue current meds  Monitor BP         Weakness generalized - Primary       Continue therapy          Medications, treatments, and labs reviewed  Continue medications and treatments as listed in [facility]    Scribe Attestation  By signing my name below, I, Lico Tolliver   attest that this documentation has been prepared under the direction and in the presence of Heather Mcdonald MD.    Provider Attestation - Scribe documentation  All medical record entries made by the Scribe were at my direction and personally dictated by me. I have reviewed the chart and agree that the record accurately reflects my personal performance of the history, physical exam, discussion and plan.  1. Weakness generalized        2. Essential hypertension

## 2023-08-30 NOTE — LETTER
Patient: Tana Hargrove  : 1945    Encounter Date: 2023    PROGRESS NOTE    Subjective  Chief complaint: Tana Hargrove is a 78 y.o. female who is an acute skilled patient being seen and evaluated for weakness    HPI:  Therapy update:  2023  Ambulation - // SBA/CGA  Transfers - CGA/MIN  Stairs - n/a  ST - n/a    2023  Ambulation - 40' FWW CGA  Stairs - n/a  ST - n/a    Patient is seen today for follow-up therapy.  Patient has been working on strengthening, transfers, and adls and continues to work toward goals.  She is making progress in walking increasing distances up to 40 feet with front wheeled walker with contact-guard assist.  Uneventful night per nursing staff.  Patient has no new concerns at this time.  Denies constitutional symptoms.         Objective  Vital signs: 132/74, 20, 98.0, 69, 99%, blood sugar 123    Physical Exam  Constitutional:       General: She is not in acute distress.  Cardiovascular:      Rate and Rhythm: Normal rate and regular rhythm.   Pulmonary:      Effort: Pulmonary effort is normal.      Breath sounds: Normal breath sounds.   Musculoskeletal:      Cervical back: Neck supple.      Right lower leg: No edema.      Left lower leg: No edema.      Comments: Generalized weakness   Neurological:      Mental Status: She is alert.   Psychiatric:         Behavior: Behavior is cooperative.         Assessment/Plan  Problem List Items Addressed This Visit       A-fib (CMS/[facility])     BB  HR controlled         Chronic diastolic heart failure of unknown etiology (CMS/[facility])     Stable, no shortness of breath  BB  Diuretic  Monitor weight         COPD without exacerbation (CMS/[facility])     Stable  On RA         Hypertension, essential     Blood pressure at goal  Continue antihypertensives  Monitor blood pressure         Type 2 diabetes mellitus with stage 4 chronic kidney disease, without long-term current use of insulin (CMS/[facility])     FBG at goal  Amaryl  Januvia  Monitor Glucoscan          Weakness generalized - Primary     Continue working with therapy, making progress          Medications, treatments, and labs reviewed  Continue medications and treatments as listed in EMR    TIKA Huddleston      Electronically Signed By: TIKA Huddleston   9/5/23  3:25 PM

## 2023-08-31 ENCOUNTER — NURSING HOME VISIT (OUTPATIENT)
Dept: POST ACUTE CARE | Facility: EXTERNAL LOCATION | Age: 78
End: 2023-08-31

## 2023-08-31 ENCOUNTER — APPOINTMENT (OUTPATIENT)
Dept: PRIMARY CARE | Facility: CLINIC | Age: 78
End: 2023-08-31
Payer: MEDICARE

## 2023-08-31 DIAGNOSIS — J44.9 COPD WITHOUT EXACERBATION (MULTI): ICD-10-CM

## 2023-08-31 DIAGNOSIS — R53.1 WEAKNESS GENERALIZED: Primary | ICD-10-CM

## 2023-08-31 DIAGNOSIS — I50.32 CHRONIC DIASTOLIC HEART FAILURE OF UNKNOWN ETIOLOGY (MULTI): ICD-10-CM

## 2023-08-31 DIAGNOSIS — I48.91 ATRIAL FIBRILLATION, UNSPECIFIED TYPE (MULTI): ICD-10-CM

## 2023-08-31 DIAGNOSIS — E11.22 TYPE 2 DIABETES MELLITUS WITH STAGE 4 CHRONIC KIDNEY DISEASE, WITHOUT LONG-TERM CURRENT USE OF INSULIN (MULTI): ICD-10-CM

## 2023-08-31 DIAGNOSIS — N18.4 TYPE 2 DIABETES MELLITUS WITH STAGE 4 CHRONIC KIDNEY DISEASE, WITHOUT LONG-TERM CURRENT USE OF INSULIN (MULTI): ICD-10-CM
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
## 2023-08-31 DIAGNOSIS — I10 HYPERTENSION, ESSENTIAL: ICD-10-CM

## 2023-08-31 PROCEDURE — 99309 SBSQ NF CARE MODERATE MDM 30: CPT | Performed by: NURSE PRACTITIONER

## 2023-08-31 NOTE — LETTER
Patient: Tana Hargrove  : 1945    Encounter Date: 2023    PROGRESS NOTE    Subjective  Chief complaint: Tana Hargrove is a 78 y.o. female who is an acute skilled patient being seen and evaluated for weakness and fall    HPI:  HPI   Patient continues to work toward goals in therapy.  Patient is making progress in therapy.  She is able to walk short distances up to 40 feet with front wheeled walker with contact-guard assist.  Nurse reported that the patient had a fall.  Patient denies pain and injury from the fall.  She denies losing consciousness.  No other concerns at this time.  Objective  Vital signs: 130/72, 18, 97.9, 72, 98%, blood sugar 182    Physical Exam  Constitutional:       General: She is not in acute distress.  Cardiovascular:      Rate and Rhythm: Normal rate and regular rhythm.   Pulmonary:      Effort: Pulmonary effort is normal.      Breath sounds: Normal breath sounds.   Musculoskeletal:      Cervical back: Neck supple.      Right lower leg: No edema.      Left lower leg: No edema.      Comments: Generalized weakness  Range of motion at baseline   Neurological:      Mental Status: She is alert.   Psychiatric:         Behavior: Behavior is cooperative.         Assessment/Plan  Problem List Items Addressed This Visit       A-fib (CMS/HCC)     BB  HR controlled         Chronic diastolic heart failure of unknown etiology (CMS/HCC)     Stable, no shortness of breath  BB  Diuretic  Monitor weight         COPD without exacerbation (CMS/HCC)     Stable  On RA         Fall     No apparent injury         Hypertension, essential     Blood pressure at goal  Continue antihypertensives  Monitor blood pressure         Type 2 diabetes mellitus with stage 4 chronic kidney disease, without long-term current use of insulin (CMS/HCC)     FBG at goal  Amaryl  Januvia  Monitor Glucoscan         Weakness generalized - Primary     Continue working with therapy, making progress          Medications,  treatments, and labs reviewed  Continue medications and treatments as listed in EMR    TIKA Huddleston      Electronically Signed By: TIKA Huddleston   9/7/23  3:18 PM

## 2023-09-01 ENCOUNTER — DOCUMENTATION (OUTPATIENT)
Dept: POST ACUTE CARE | Facility: EXTERNAL LOCATION | Age: 78
End: 2023-09-01
Payer: MEDICARE

## 2023-09-01 ENCOUNTER — NURSING HOME VISIT (OUTPATIENT)
Dept: POST ACUTE CARE | Facility: EXTERNAL LOCATION | Age: 78
End: 2023-09-01
Payer: MEDICARE

## 2023-09-01 DIAGNOSIS — R53.1 WEAKNESS GENERALIZED: Primary | ICD-10-CM

## 2023-09-01 DIAGNOSIS — I50.32 CHRONIC DIASTOLIC HEART FAILURE OF UNKNOWN ETIOLOGY (MULTI): ICD-10-CM

## 2023-09-01 PROCEDURE — 99308 SBSQ NF CARE LOW MDM 20: CPT | Performed by: INTERNAL MEDICINE

## 2023-09-01 NOTE — LETTER
Patient: Tana Hargrove  : 1945    Encounter Date: 2023    PROGRESS NOTE    Subjective  Chief complaint: Tana Hargrove is a 78 y.o. female who is an acute skilled patient being seen and evaluated for weakness    HPI:   patient is working in therapy.  Patient requires assistance for transfers ADLs and mobility.  No new issues or concerns.  denies SOB or orthopnea. No acute distress.       Objective  Vital signs:   124/76, 98%    Physical Exam  Constitutional:       General: She is not in acute distress.  Eyes:      Extraocular Movements: Extraocular movements intact.   Cardiovascular:      Rate and Rhythm: Normal rate and regular rhythm.   Pulmonary:      Effort: Pulmonary effort is normal.      Breath sounds: Normal breath sounds.   Abdominal:      General: Bowel sounds are normal.      Palpations: Abdomen is soft.   Musculoskeletal:      Cervical back: Neck supple.      Right lower leg: No edema.      Left lower leg: No edema.   Neurological:      Mental Status: She is alert.   Psychiatric:         Mood and Affect: Mood normal.         Behavior: Behavior is cooperative.         Assessment/Plan  Problem List Items Addressed This Visit       Chronic diastolic heart failure of unknown etiology (CMS/HCC)     Stable  BB  Diuretic  Monitor weight         Weakness generalized - Primary     Continue working with therapy         Medications, treatments, and labs reviewed  Continue medications and treatments as listed in [facility]    Scribe Attestation  By signing my name below, I, Lico Tolliver   attest that this documentation has been prepared under the direction and in the presence of Heather Mcdonald MD.    Provider Attestation - Scribe documentation  All medical record entries made by the Scribe were at my direction and personally dictated by me. I have reviewed the chart and agree that the record accurately reflects my personal performance of the history, physical exam, discussion and plan.  1. Weakness  generalized        2. Chronic diastolic heart failure of unknown etiology (CMS/HCC)              Electronically Signed By: Heather Mcdonald MD   9/4/23  6:18 PM

## 2023-09-02 PROBLEM — M41.26 OTHER IDIOPATHIC SCOLIOSIS, LUMBAR REGION: Status: RESOLVED | Noted: 2023-05-04 | Resolved: 2023-09-02

## 2023-09-02 PROBLEM — I50.9 CONGESTIVE HEART FAILURE (MULTI): Status: RESOLVED | Noted: 2023-07-28 | Resolved: 2023-09-02

## 2023-09-02 PROBLEM — M54.2 NECK PAIN: Status: RESOLVED | Noted: 2023-05-25 | Resolved: 2023-09-02

## 2023-09-02 PROBLEM — R32 URINARY INCONTINENCE: Status: RESOLVED | Noted: 2023-05-25 | Resolved: 2023-09-02

## 2023-09-02 PROBLEM — R27.0 ATAXIA: Status: RESOLVED | Noted: 2023-05-04 | Resolved: 2023-09-02

## 2023-09-02 PROBLEM — Z86.010 HISTORY OF COLON POLYPS: Status: RESOLVED | Noted: 2023-05-04 | Resolved: 2023-09-02

## 2023-09-02 PROBLEM — E11.22 TYPE 2 DIABETES MELLITUS WITH CHRONIC KIDNEY DISEASE, WITHOUT LONG-TERM CURRENT USE OF INSULIN (MULTI): Status: ACTIVE | Noted: 2021-03-05

## 2023-09-02 PROBLEM — Z86.0100 HISTORY OF COLON POLYPS: Status: RESOLVED | Noted: 2023-05-04 | Resolved: 2023-09-02

## 2023-09-02 PROBLEM — J44.9 COPD WITHOUT EXACERBATION (MULTI): Status: ACTIVE | Noted: 2023-09-02

## 2023-09-02 PROBLEM — E11.9 TYPE 2 DIABETES MELLITUS (MULTI): Status: RESOLVED | Noted: 2021-03-05 | Resolved: 2023-09-02

## 2023-09-02 PROBLEM — M17.11 PRIMARY OSTEOARTHRITIS OF RIGHT KNEE: Status: ACTIVE | Noted: 2023-09-02

## 2023-09-02 PROBLEM — L97.529 ULCER OF LEFT FOOT (MULTI): Status: RESOLVED | Noted: 2023-05-04 | Resolved: 2023-09-02

## 2023-09-02 PROBLEM — J96.91 RESPIRATORY FAILURE WITH HYPOXIA (MULTI): Status: RESOLVED | Noted: 2023-05-04 | Resolved: 2023-09-02

## 2023-09-02 PROBLEM — M25.69 BACK STIFFNESS: Status: RESOLVED | Noted: 2023-05-04 | Resolved: 2023-09-02

## 2023-09-02 PROBLEM — N18.4 TYPE 2 DIABETES MELLITUS WITH STAGE 4 CHRONIC KIDNEY DISEASE, WITHOUT LONG-TERM CURRENT USE OF INSULIN (MULTI): Status: ACTIVE | Noted: 2021-03-05

## 2023-09-02 PROBLEM — R06.02 SOB (SHORTNESS OF BREATH): Status: RESOLVED | Noted: 2023-05-04 | Resolved: 2023-09-02

## 2023-09-02 PROBLEM — J30.9 ALLERGIC RHINITIS: Status: RESOLVED | Noted: 2023-05-04 | Resolved: 2023-09-02

## 2023-09-02 PROBLEM — N17.9 AKI (ACUTE KIDNEY INJURY) (CMS-HCC): Status: RESOLVED | Noted: 2021-03-05 | Resolved: 2023-09-02

## 2023-09-02 NOTE — PROGRESS NOTES
PROGRESS NOTE    Subjective   Chief complaint: Tana Hargrove is a 78 y.o. female who is an acute skilled patient being seen and evaluated for weakness    HPI:  No new concerns today.  Uneventful night.  Denies constitutional symptoms.  Actively participating in therapy and continues working toward goals.      Objective   Vital signs: 132/67,     Physical Exam  Constitutional:       General: She is not in acute distress.  Cardiovascular:      Rate and Rhythm: Normal rate and regular rhythm.   Pulmonary:      Effort: Pulmonary effort is normal.      Breath sounds: Normal breath sounds.   Musculoskeletal:      Cervical back: Neck supple.      Right lower leg: No edema.      Left lower leg: No edema.      Comments: Generalized weakness   Neurological:      Mental Status: She is alert.   Psychiatric:         Behavior: Behavior is cooperative.         Assessment/Plan   Problem List Items Addressed This Visit       A-fib (CMS/[facility])     BB         Chronic diastolic heart failure of unknown etiology (CMS/HCC)     Stable  BB  Diuretic  Monitor weight         Hypertension, essential     Blood pressure at goal  Continue antihypertensives  Monitor blood pressure         Type 2 diabetes mellitus with stage 4 chronic kidney disease, without long-term current use of insulin (CMS/[facility])     Amaryl  Januvia  Monitor Glucoscan         Weakness generalized - Primary     Continue working with therapy           Medications, treatments, and labs reviewed  Continue medications and treatments as listed in EMR    Urvashi Luis, APRN-CNP

## 2023-09-02 NOTE — ASSESSMENT & PLAN NOTE
Amaryl  Januvia  Monitor Glucoscan  
BB  
Blood pressure at goal  Continue antihypertensives  Monitor blood pressure  
Continue working with therapy   
Stable  BB  Diuretic  Monitor weight  
04-Mar-2019 16:29

## 2023-09-02 NOTE — PROGRESS NOTES
PROGRESS NOTE    Subjective   Chief complaint: Tana Hargrove is a 78 y.o. female who is an acute skilled patient being seen and evaluated for weakness    HPI:  Patient admitted to skilled nursing facility for therapy due to weakness after recent hospitalization status post fall at home.  She has complaint of chronic right knee pain with imaging significant for osteoarthritis at hospital.  Patient also noted to have anemia and was given blood transfusion.  She is admitted to skilled nursing facility for therapy due to acute on chronic weakness.      Objective   Vital signs: 132/67, 18, 98.0, 55, 96%, blood sugar 143    Physical Exam  Constitutional:       General: She is not in acute distress.  Eyes:      Extraocular Movements: Extraocular movements intact.   Cardiovascular:      Rate and Rhythm: Normal rate and regular rhythm.   Pulmonary:      Effort: Pulmonary effort is normal.      Breath sounds: Normal breath sounds.   Abdominal:      General: Bowel sounds are normal.      Palpations: Abdomen is soft.   Musculoskeletal:      Cervical back: Neck supple.      Right lower leg: No edema.      Left lower leg: No edema.      Comments: Generalized weakness  Right knee tender to palpation   Neurological:      Mental Status: She is alert.   Psychiatric:         Mood and Affect: Mood normal.         Behavior: Behavior is cooperative.         Assessment/Plan   Problem List Items Addressed This Visit       A-fib (CMS/HCC)     HR controlled  BB         Chronic diastolic heart failure of unknown etiology (CMS/HCC)     Stable  BB  Diuretic  Monitor weight         COPD without exacerbation (CMS/HCC)     Stable  On RA         Frequent falls - Primary     Therapy         Hypertension, essential     Blood pressure at goal  Continue antihypertensives  Monitor blood pressure         Primary osteoarthritis of right knee     Pain meds  Therapy         Type 2 diabetes mellitus with stage 4 chronic kidney disease, without long-term  current use of insulin (CMS/[facility])     Blood glucose at goal  Amaryl  Januvia  Monitor Glucoscan         Weakness generalized     Therapy to eval and treat          Medications, treatments, and labs reviewed  Continue medications and treatments as listed in EMR    Urvashi Luis, APRN-CNP

## 2023-09-04 NOTE — PROGRESS NOTES
Therapy update:  8/28/2023  Ambulation - // SBA/CGA  Transfers - CGA/MIN  Stairs - n/a  ST - n/a

## 2023-09-04 NOTE — PROGRESS NOTES
PROGRESS NOTE    Subjective   Chief complaint: Tana Hargrove is a 78 y.o. female who is an acute skilled patient being seen and evaluated for weakness    HPI:   patient is working in therapy.  Patient requires assistance for transfers ADLs and mobility.  No new issues or concerns.  denies SOB or orthopnea. No acute distress.       Objective   Vital signs:   124/76, 98%    Physical Exam  Constitutional:       General: She is not in acute distress.  Eyes:      Extraocular Movements: Extraocular movements intact.   Cardiovascular:      Rate and Rhythm: Normal rate and regular rhythm.   Pulmonary:      Effort: Pulmonary effort is normal.      Breath sounds: Normal breath sounds.   Abdominal:      General: Bowel sounds are normal.      Palpations: Abdomen is soft.   Musculoskeletal:      Cervical back: Neck supple.      Right lower leg: No edema.      Left lower leg: No edema.   Neurological:      Mental Status: She is alert.   Psychiatric:         Mood and Affect: Mood normal.         Behavior: Behavior is cooperative.         Assessment/Plan   Problem List Items Addressed This Visit       Chronic diastolic heart failure of unknown etiology (CMS/HCC)     Stable  BB  Diuretic  Monitor weight         Weakness generalized - Primary     Continue working with therapy         Medications, treatments, and labs reviewed  Continue medications and treatments as listed in [facility]    Scribe Attestation  By signing my name below, IMaria Isabel Scribe   attest that this documentation has been prepared under the direction and in the presence of Heather Mcdonald MD.    Provider Attestation - Scribe documentation  All medical record entries made by the Scribe were at my direction and personally dictated by me. I have reviewed the chart and agree that the record accurately reflects my personal performance of the history, physical exam, discussion and plan.  1. Weakness generalized        2. Chronic diastolic heart failure of unknown  etiology (CMS/HCC)

## 2023-09-04 NOTE — PROGRESS NOTES
Therapy update:  8/28/2023  Ambulation - // SBA/CGA  Transfers - CGA/MIN  Stairs - n/a  ST - n/a    8/30/2023  Ambulation - 40' FWW CGA  Stairs - n/a  ST - n/a    9/1/2023  Ambulation - FWW CGA shuffling gait

## 2023-09-04 NOTE — PROGRESS NOTES
PROGRESS NOTE    Subjective   Chief complaint: Tana Hargrove is a 78 y.o. female who is an acute skilled patient being seen and evaluated for weakness    HPI:  Therapy update:  8/28/2023  Ambulation - // SBA/CGA  Transfers - CGA/MIN  Stairs - n/a  ST - n/a    Patient is working with therapy dt weakness and continues working toward goals.  She requires wheelchair for mobility and is working on ambulation at parallel bars.  Patient requires contact guard to minimal assist for transfers.   She has no new concerns today.  States she is feeling fine.  No new concerns per nursing staff.      Objective   Vital signs: 128/64, 77, bs 160    Physical Exam  Constitutional:       General: She is not in acute distress.  Cardiovascular:      Rate and Rhythm: Normal rate and regular rhythm.   Pulmonary:      Effort: Pulmonary effort is normal.      Breath sounds: Normal breath sounds.   Musculoskeletal:      Cervical back: Neck supple.      Right lower leg: No edema.      Left lower leg: No edema.      Comments: Generalized weakness   Neurological:      Mental Status: She is alert.   Psychiatric:         Behavior: Behavior is cooperative.         Assessment/Plan   Problem List Items Addressed This Visit       A-fib (CMS/HCC)     BB  HR controlled         Chronic diastolic heart failure of unknown etiology (CMS/HCC)     Stable  BB  Diuretic  Monitor weight         COPD without exacerbation (CMS/HCC)     Stable  On RA         Hypertension, essential     Blood pressure at goal  Continue antihypertensives  Monitor blood pressure         Type 2 diabetes mellitus with stage 4 chronic kidney disease, without long-term current use of insulin (CMS/HCC)     FBG at goal  Amaryl  Januvia  Monitor Glucoscan         Weakness generalized - Primary     Continue working with therapy, actively participating          Medications, treatments, and labs reviewed  Continue medications and treatments as listed in EMR    Urvashi Luis, APRN-CNP

## 2023-09-04 NOTE — PROGRESS NOTES
Therapy update:  8/28/2023  Ambulation - // SBA/CGA  Transfers - CGA/MIN  Stairs - n/a  ST - n/a    8/30/2023  Ambulation - 40' FWW CGA  Stairs - n/a  ST - n/a

## 2023-09-05 PROBLEM — G45.9 TIA (TRANSIENT ISCHEMIC ATTACK): Status: RESOLVED | Noted: 2023-05-04 | Resolved: 2023-09-05

## 2023-09-05 PROBLEM — E53.8 VITAMIN B12 DEFICIENCY: Status: RESOLVED | Noted: 2023-05-25 | Resolved: 2023-09-05

## 2023-09-05 NOTE — PROGRESS NOTES
PROGRESS NOTE    Subjective   Chief complaint: Tana Hargrove is a 78 y.o. female who is an acute skilled patient being seen and evaluated for weakness    HPI:  Therapy update:  8/28/2023  Ambulation - // SBA/CGA  Transfers - CGA/MIN  Stairs - n/a  ST - n/a    8/30/2023  Ambulation - 40' FWW CGA  Stairs - n/a  ST - n/a    Patient is seen today for follow-up therapy.  Patient has been working on strengthening, transfers, and adls and continues to work toward goals.  She is making progress in walking increasing distances up to 40 feet with front wheeled walker with contact-guard assist.  Uneventful night per nursing staff.  Patient has no new concerns at this time.  Denies constitutional symptoms.         Objective   Vital signs: 132/74, 20, 98.0, 69, 99%, blood sugar 123    Physical Exam  Constitutional:       General: She is not in acute distress.  Cardiovascular:      Rate and Rhythm: Normal rate and regular rhythm.   Pulmonary:      Effort: Pulmonary effort is normal.      Breath sounds: Normal breath sounds.   Musculoskeletal:      Cervical back: Neck supple.      Right lower leg: No edema.      Left lower leg: No edema.      Comments: Generalized weakness   Neurological:      Mental Status: She is alert.   Psychiatric:         Behavior: Behavior is cooperative.         Assessment/Plan   Problem List Items Addressed This Visit       A-fib (CMS/HCC)     BB  HR controlled         Chronic diastolic heart failure of unknown etiology (CMS/HCC)     Stable, no shortness of breath  BB  Diuretic  Monitor weight         COPD without exacerbation (CMS/HCC)     Stable  On RA         Hypertension, essential     Blood pressure at goal  Continue antihypertensives  Monitor blood pressure         Type 2 diabetes mellitus with stage 4 chronic kidney disease, without long-term current use of insulin (CMS/HCC)     FBG at goal  Amaryl  Januvia  Monitor Glucoscan         Weakness generalized - Primary     Continue working with  therapy, making progress          Medications, treatments, and labs reviewed  Continue medications and treatments as listed in EMR    Urvashi Luis, APRN-CNP
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
## 2023-09-07 NOTE — PROGRESS NOTES
PROGRESS NOTE    Subjective   Chief complaint: Tana Hargrove is a 78 y.o. female who is an acute skilled patient being seen and evaluated for weakness and fall    HPI:  HPI   Patient continues to work toward goals in therapy.  Patient is making progress in therapy.  She is able to walk short distances up to 40 feet with front wheeled walker with contact-guard assist.  Nurse reported that the patient had a fall.  Patient denies pain and injury from the fall.  She denies losing consciousness.  No other concerns at this time.  Objective   Vital signs: 130/72, 18, 97.9, 72, 98%, blood sugar 182    Physical Exam  Constitutional:       General: She is not in acute distress.  Cardiovascular:      Rate and Rhythm: Normal rate and regular rhythm.   Pulmonary:      Effort: Pulmonary effort is normal.      Breath sounds: Normal breath sounds.   Musculoskeletal:      Cervical back: Neck supple.      Right lower leg: No edema.      Left lower leg: No edema.      Comments: Generalized weakness  Range of motion at baseline   Neurological:      Mental Status: She is alert.   Psychiatric:         Behavior: Behavior is cooperative.         Assessment/Plan   Problem List Items Addressed This Visit       A-fib (CMS/HCC)     BB  HR controlled         Chronic diastolic heart failure of unknown etiology (CMS/HCC)     Stable, no shortness of breath  BB  Diuretic  Monitor weight         COPD without exacerbation (CMS/HCC)     Stable  On RA         Fall     No apparent injury         Hypertension, essential     Blood pressure at goal  Continue antihypertensives  Monitor blood pressure         Type 2 diabetes mellitus with stage 4 chronic kidney disease, without long-term current use of insulin (CMS/HCC)     FBG at goal  Amaryl  Januvia  Monitor Glucoscan         Weakness generalized - Primary     Continue working with therapy, making progress          Medications, treatments, and labs reviewed  Continue medications and treatments as listed  in LINDA Luis, APRN-CNP

## 2023-09-13 ENCOUNTER — OFFICE VISIT (OUTPATIENT)
Dept: PRIMARY CARE | Facility: CLINIC | Age: 78
End: 2023-09-13
Payer: MEDICARE

## 2023-09-13 VITALS
HEART RATE: 74 BPM | OXYGEN SATURATION: 99 % | RESPIRATION RATE: 16 BRPM | DIASTOLIC BLOOD PRESSURE: 84 MMHG | TEMPERATURE: 96.8 F | SYSTOLIC BLOOD PRESSURE: 148 MMHG | BODY MASS INDEX: 32.88 KG/M2 | WEIGHT: 174 LBS

## 2023-09-13 DIAGNOSIS — M25.561 PAIN AND SWELLING OF KNEE, RIGHT: ICD-10-CM

## 2023-09-13 DIAGNOSIS — M25.461 PAIN AND SWELLING OF KNEE, RIGHT: ICD-10-CM

## 2023-09-13 DIAGNOSIS — E11.9 TYPE 2 DIABETES MELLITUS WITHOUT COMPLICATION, WITHOUT LONG-TERM CURRENT USE OF INSULIN (MULTI): ICD-10-CM

## 2023-09-13 DIAGNOSIS — N18.4 STAGE 4 CHRONIC KIDNEY DISEASE (MULTI): ICD-10-CM

## 2023-09-13 DIAGNOSIS — I10 ESSENTIAL HYPERTENSION: ICD-10-CM

## 2023-09-13 DIAGNOSIS — M51.36 DEGENERATION OF INTERVERTEBRAL DISC OF LUMBAR REGION: ICD-10-CM

## 2023-09-13 DIAGNOSIS — R53.81 PHYSICAL DECONDITIONING: ICD-10-CM

## 2023-09-13 DIAGNOSIS — R29.6 RECURRENT FALLS: ICD-10-CM

## 2023-09-13 DIAGNOSIS — Z09 HOSPITAL DISCHARGE FOLLOW-UP: Primary | ICD-10-CM

## 2023-09-13 PROBLEM — N17.9 ACUTE KIDNEY FAILURE, UNSPECIFIED (CMS-HCC): Status: ACTIVE | Noted: 2023-08-22

## 2023-09-13 PROBLEM — I50.32 CHRONIC DIASTOLIC (CONGESTIVE) HEART FAILURE (MULTI): Status: ACTIVE | Noted: 2023-08-22

## 2023-09-13 PROBLEM — E11.65 TYPE 2 DIABETES MELLITUS WITH HYPERGLYCEMIA, WITHOUT LONG-TERM CURRENT USE OF INSULIN (MULTI): Status: ACTIVE | Noted: 2023-09-13

## 2023-09-13 PROBLEM — D64.9 ANEMIA, UNSPECIFIED: Status: ACTIVE | Noted: 2023-08-22

## 2023-09-13 PROBLEM — E78.5 HYPERLIPIDEMIA, UNSPECIFIED: Status: ACTIVE | Noted: 2023-08-22

## 2023-09-13 PROBLEM — I48.91 UNSPECIFIED ATRIAL FIBRILLATION (MULTI): Status: ACTIVE | Noted: 2023-08-22

## 2023-09-13 PROBLEM — M19.91 PRIMARY OSTEOARTHRITIS: Status: ACTIVE | Noted: 2023-08-22

## 2023-09-13 PROBLEM — J44.9 CHRONIC OBSTRUCTIVE PULMONARY DISEASE, UNSPECIFIED (MULTI): Status: ACTIVE | Noted: 2023-08-22

## 2023-09-13 PROBLEM — M62.81 MUSCLE WEAKNESS (GENERALIZED): Status: ACTIVE | Noted: 2023-08-22

## 2023-09-13 PROCEDURE — 1036F TOBACCO NON-USER: CPT | Performed by: STUDENT IN AN ORGANIZED HEALTH CARE EDUCATION/TRAINING PROGRAM

## 2023-09-13 PROCEDURE — 3077F SYST BP >= 140 MM HG: CPT | Performed by: STUDENT IN AN ORGANIZED HEALTH CARE EDUCATION/TRAINING PROGRAM

## 2023-09-13 PROCEDURE — 1159F MED LIST DOCD IN RCRD: CPT | Performed by: STUDENT IN AN ORGANIZED HEALTH CARE EDUCATION/TRAINING PROGRAM

## 2023-09-13 PROCEDURE — 1160F RVW MEDS BY RX/DR IN RCRD: CPT | Performed by: STUDENT IN AN ORGANIZED HEALTH CARE EDUCATION/TRAINING PROGRAM

## 2023-09-13 PROCEDURE — 1126F AMNT PAIN NOTED NONE PRSNT: CPT | Performed by: STUDENT IN AN ORGANIZED HEALTH CARE EDUCATION/TRAINING PROGRAM

## 2023-09-13 PROCEDURE — 99214 OFFICE O/P EST MOD 30 MIN: CPT | Performed by: STUDENT IN AN ORGANIZED HEALTH CARE EDUCATION/TRAINING PROGRAM

## 2023-09-13 PROCEDURE — 3079F DIAST BP 80-89 MM HG: CPT | Performed by: STUDENT IN AN ORGANIZED HEALTH CARE EDUCATION/TRAINING PROGRAM

## 2023-09-13 PROCEDURE — 1157F ADVNC CARE PLAN IN RCRD: CPT | Performed by: STUDENT IN AN ORGANIZED HEALTH CARE EDUCATION/TRAINING PROGRAM

## 2023-09-13 SDOH — ECONOMIC STABILITY: FOOD INSECURITY: WITHIN THE PAST 12 MONTHS, THE FOOD YOU BOUGHT JUST DIDN'T LAST AND YOU DIDN'T HAVE MONEY TO GET MORE.: NEVER TRUE

## 2023-09-13 SDOH — ECONOMIC STABILITY: FOOD INSECURITY: WITHIN THE PAST 12 MONTHS, YOU WORRIED THAT YOUR FOOD WOULD RUN OUT BEFORE YOU GOT MONEY TO BUY MORE.: NEVER TRUE

## 2023-09-13 ASSESSMENT — LIFESTYLE VARIABLES
SKIP TO QUESTIONS 9-10: 1
AUDIT-C TOTAL SCORE: 0
HOW OFTEN DO YOU HAVE SIX OR MORE DRINKS ON ONE OCCASION: NEVER
HOW MANY STANDARD DRINKS CONTAINING ALCOHOL DO YOU HAVE ON A TYPICAL DAY: PATIENT DOES NOT DRINK
HOW OFTEN DO YOU HAVE A DRINK CONTAINING ALCOHOL: NEVER

## 2023-09-13 ASSESSMENT — PATIENT HEALTH QUESTIONNAIRE - PHQ9
1. LITTLE INTEREST OR PLEASURE IN DOING THINGS: NOT AT ALL
SUM OF ALL RESPONSES TO PHQ9 QUESTIONS 1 & 2: 0
2. FEELING DOWN, DEPRESSED OR HOPELESS: NOT AT ALL

## 2023-09-13 ASSESSMENT — PAIN SCALES - GENERAL: PAINLEVEL: 0-NO PAIN

## 2023-09-14 ASSESSMENT — ENCOUNTER SYMPTOMS
FATIGUE: 0
UNEXPECTED WEIGHT CHANGE: 0
WHEEZING: 0
VOMITING: 0
HEADACHES: 0
CONFUSION: 0
ABDOMINAL PAIN: 0
DIZZINESS: 0
DIARRHEA: 0
NAUSEA: 0
COLOR CHANGE: 0
COUGH: 0
CONSTIPATION: 0
PALPITATIONS: 0
CHILLS: 0
MUSCULOSKELETAL NEGATIVE: 1
FEVER: 0
SHORTNESS OF BREATH: 0

## 2023-09-26 ENCOUNTER — TELEPHONE (OUTPATIENT)
Dept: PRIMARY CARE | Facility: CLINIC | Age: 78
End: 2023-09-26
Payer: MEDICARE

## 2023-09-26 NOTE — TELEPHONE ENCOUNTER
"Patient's home care nurse called stating patient did not take meds yet today and /78    Patient feels very tired, mercy like she got \"hit by a bus\"  body was achy, declined to go to the hospital  "

## 2023-09-27 ENCOUNTER — TELEPHONE (OUTPATIENT)
Dept: PRIMARY CARE | Facility: CLINIC | Age: 78
End: 2023-09-27
Payer: MEDICARE

## 2023-09-27 DIAGNOSIS — E78.5 HYPERLIPIDEMIA, UNSPECIFIED HYPERLIPIDEMIA TYPE: ICD-10-CM

## 2023-09-27 DIAGNOSIS — E11.9 TYPE 2 DIABETES MELLITUS WITHOUT COMPLICATION, WITHOUT LONG-TERM CURRENT USE OF INSULIN (MULTI): Primary | ICD-10-CM

## 2023-09-27 NOTE — TELEPHONE ENCOUNTER
Pt called in and is requesting a refill on Pravastatin, Januvia 100 mg, Pioglitazone 15 mg. BP was the last to Prescribe.     Pt's pharmacy is [facility]

## 2023-09-28 VITALS — WEIGHT: 167.11 LBS | HEIGHT: 62 IN | BODY MASS INDEX: 30.75 KG/M2

## 2023-10-01 RX ORDER — PIOGLITAZONEHYDROCHLORIDE 15 MG/1
15 TABLET ORAL DAILY
Qty: 90 TABLET | Refills: 0 | Status: SHIPPED | OUTPATIENT
Start: 2023-10-01 | End: 2024-02-21

## 2023-10-01 RX ORDER — PRAVASTATIN SODIUM 40 MG/1
40 TABLET ORAL NIGHTLY
Qty: 90 TABLET | Refills: 0 | Status: SHIPPED | OUTPATIENT
Start: 2023-10-01 | End: 2024-02-21

## 2023-10-02 ENCOUNTER — INFUSION (OUTPATIENT)
Dept: HEMATOLOGY/ONCOLOGY | Facility: CLINIC | Age: 78
End: 2023-10-02
Payer: MEDICARE

## 2023-10-02 ENCOUNTER — CLINICAL SUPPORT (OUTPATIENT)
Dept: LAB | Facility: HOSPITAL | Age: 78
End: 2023-10-02
Payer: MEDICARE

## 2023-10-02 ENCOUNTER — TELEPHONE (OUTPATIENT)
Dept: HEMATOLOGY/ONCOLOGY | Facility: HOSPITAL | Age: 78
End: 2023-10-02

## 2023-10-02 VITALS
DIASTOLIC BLOOD PRESSURE: 68 MMHG | HEIGHT: 62 IN | TEMPERATURE: 97.7 F | RESPIRATION RATE: 16 BRPM | HEART RATE: 79 BPM | OXYGEN SATURATION: 97 % | SYSTOLIC BLOOD PRESSURE: 151 MMHG | BODY MASS INDEX: 30.6 KG/M2

## 2023-10-02 DIAGNOSIS — D46.9 MYELODYSPLASTIC SYNDROME (MULTI): Primary | ICD-10-CM

## 2023-10-02 DIAGNOSIS — D46.9 MYELODYSPLASTIC SYNDROME (MULTI): ICD-10-CM

## 2023-10-02 LAB
ERYTHROCYTE [DISTWIDTH] IN BLOOD BY AUTOMATED COUNT: 24.9 % (ref 11.5–14.5)
HCT VFR BLD AUTO: 23.9 % (ref 36–46)
HGB BLD-MCNC: 7.2 G/DL (ref 12–16)
MCH RBC QN AUTO: 30.8 PG (ref 26–34)
MCHC RBC AUTO-ENTMCNC: 30.1 G/DL (ref 32–36)
MCV RBC AUTO: 102 FL (ref 80–100)
NRBC BLD-RTO: ABNORMAL /100{WBCS}
PLATELET # BLD AUTO: 266 X10*3/UL (ref 150–450)
PMV BLD AUTO: 11.6 FL (ref 7.5–11.5)
RBC # BLD AUTO: 2.34 X10*6/UL (ref 4–5.2)
WBC # BLD AUTO: 5.9 X10*3/UL (ref 4.4–11.3)

## 2023-10-02 PROCEDURE — 85027 COMPLETE CBC AUTOMATED: CPT

## 2023-10-02 PROCEDURE — 36415 COLL VENOUS BLD VENIPUNCTURE: CPT

## 2023-10-02 RX ORDER — FAMOTIDINE 10 MG/ML
20 INJECTION INTRAVENOUS ONCE AS NEEDED
Status: CANCELLED | OUTPATIENT
Start: 2023-10-09

## 2023-10-02 RX ORDER — HEPARIN SODIUM,PORCINE/PF 10 UNIT/ML
50 SYRINGE (ML) INTRAVENOUS AS NEEDED
Status: CANCELLED | OUTPATIENT
Start: 2023-10-02

## 2023-10-02 RX ORDER — EPINEPHRINE 0.3 MG/.3ML
0.3 INJECTION SUBCUTANEOUS EVERY 5 MIN PRN
Status: CANCELLED | OUTPATIENT
Start: 2023-10-02

## 2023-10-02 RX ORDER — EPINEPHRINE 0.3 MG/.3ML
0.3 INJECTION SUBCUTANEOUS EVERY 5 MIN PRN
Status: CANCELLED | OUTPATIENT
Start: 2023-10-09

## 2023-10-02 RX ORDER — DIPHENHYDRAMINE HYDROCHLORIDE 50 MG/ML
50 INJECTION INTRAMUSCULAR; INTRAVENOUS AS NEEDED
Status: CANCELLED | OUTPATIENT
Start: 2023-10-09

## 2023-10-02 RX ORDER — ALBUTEROL SULFATE 0.83 MG/ML
3 SOLUTION RESPIRATORY (INHALATION) AS NEEDED
Status: CANCELLED | OUTPATIENT
Start: 2023-10-09

## 2023-10-02 RX ORDER — FAMOTIDINE 10 MG/ML
20 INJECTION INTRAVENOUS ONCE AS NEEDED
Status: CANCELLED | OUTPATIENT
Start: 2023-10-02

## 2023-10-02 RX ORDER — ALBUTEROL SULFATE 0.83 MG/ML
3 SOLUTION RESPIRATORY (INHALATION) AS NEEDED
Status: CANCELLED | OUTPATIENT
Start: 2023-10-02

## 2023-10-02 RX ORDER — DIPHENHYDRAMINE HYDROCHLORIDE 50 MG/ML
50 INJECTION INTRAMUSCULAR; INTRAVENOUS AS NEEDED
Status: CANCELLED | OUTPATIENT
Start: 2023-10-02

## 2023-10-02 RX ORDER — HEPARIN 100 UNIT/ML
500 SYRINGE INTRAVENOUS AS NEEDED
Status: CANCELLED | OUTPATIENT
Start: 2023-10-02

## 2023-10-05 ENCOUNTER — APPOINTMENT (OUTPATIENT)
Dept: HEMATOLOGY/ONCOLOGY | Facility: CLINIC | Age: 78
End: 2023-10-05
Payer: MEDICARE

## 2023-10-09 ENCOUNTER — LAB (OUTPATIENT)
Dept: LAB | Facility: HOSPITAL | Age: 78
End: 2023-10-09
Payer: MEDICARE

## 2023-10-09 ENCOUNTER — DOCUMENTATION (OUTPATIENT)
Dept: HEMATOLOGY/ONCOLOGY | Facility: HOSPITAL | Age: 78
End: 2023-10-09

## 2023-10-09 ENCOUNTER — TELEPHONE (OUTPATIENT)
Dept: HEMATOLOGY/ONCOLOGY | Facility: CLINIC | Age: 78
End: 2023-10-09

## 2023-10-09 ENCOUNTER — TELEPHONE (OUTPATIENT)
Dept: HEMATOLOGY/ONCOLOGY | Facility: HOSPITAL | Age: 78
End: 2023-10-09

## 2023-10-09 ENCOUNTER — INFUSION (OUTPATIENT)
Dept: HEMATOLOGY/ONCOLOGY | Facility: CLINIC | Age: 78
End: 2023-10-09
Payer: MEDICARE

## 2023-10-09 VITALS
DIASTOLIC BLOOD PRESSURE: 69 MMHG | SYSTOLIC BLOOD PRESSURE: 157 MMHG | HEIGHT: 62 IN | RESPIRATION RATE: 16 BRPM | TEMPERATURE: 98.6 F | WEIGHT: 167.11 LBS | HEART RATE: 98 BPM | OXYGEN SATURATION: 94 % | BODY MASS INDEX: 30.75 KG/M2

## 2023-10-09 DIAGNOSIS — D46.9 MYELODYSPLASTIC SYNDROME (MULTI): ICD-10-CM

## 2023-10-09 DIAGNOSIS — D53.1 OTHER MEGALOBLASTIC ANEMIA: Primary | ICD-10-CM

## 2023-10-09 DIAGNOSIS — D53.1 OTHER MEGALOBLASTIC ANEMIA: ICD-10-CM

## 2023-10-09 LAB
ABO GROUP (TYPE) IN BLOOD: NORMAL
ANTIBODY SCREEN: NORMAL
BASOPHILS # BLD AUTO: 0.05 X10*3/UL (ref 0–0.1)
BASOPHILS NFR BLD AUTO: 0.9 %
EOSINOPHIL # BLD AUTO: 0.15 X10*3/UL (ref 0–0.4)
EOSINOPHIL NFR BLD AUTO: 2.7 %
ERYTHROCYTE [DISTWIDTH] IN BLOOD BY AUTOMATED COUNT: 24.4 % (ref 11.5–14.5)
HCT VFR BLD AUTO: 20.8 % (ref 36–46)
HGB BLD-MCNC: 6.5 G/DL (ref 12–16)
HYPOCHROMIA BLD QL SMEAR: NORMAL
IMM GRANULOCYTES # BLD AUTO: 0.01 X10*3/UL (ref 0–0.5)
IMM GRANULOCYTES NFR BLD AUTO: 0.2 % (ref 0–0.9)
LYMPHOCYTES # BLD AUTO: 1.68 X10*3/UL (ref 0.8–3)
LYMPHOCYTES NFR BLD AUTO: 30.4 %
MCH RBC QN AUTO: 31.4 PG (ref 26–34)
MCHC RBC AUTO-ENTMCNC: 31.3 G/DL (ref 32–36)
MCV RBC AUTO: 101 FL (ref 80–100)
MONOCYTES # BLD AUTO: 0.36 X10*3/UL (ref 0.05–0.8)
MONOCYTES NFR BLD AUTO: 6.5 %
NEUTROPHILS # BLD AUTO: 3.28 X10*3/UL (ref 1.6–5.5)
NEUTROPHILS NFR BLD AUTO: 59.3 %
NRBC BLD-RTO: ABNORMAL /100{WBCS}
PLATELET # BLD AUTO: 247 X10*3/UL (ref 150–450)
PMV BLD AUTO: 11.6 FL (ref 7.5–11.5)
RBC # BLD AUTO: 2.07 X10*6/UL (ref 4–5.2)
RBC MORPH BLD: NORMAL
RH FACTOR (ANTIGEN D): NORMAL
WBC # BLD AUTO: 5.5 X10*3/UL (ref 4.4–11.3)

## 2023-10-09 PROCEDURE — 6350000001 HC RX 635 EPOETIN >10,000 UNITS: Mod: JZ,JG | Performed by: NURSE PRACTITIONER

## 2023-10-09 PROCEDURE — 85025 COMPLETE CBC W/AUTO DIFF WBC: CPT

## 2023-10-09 PROCEDURE — 86900 BLOOD TYPING SEROLOGIC ABO: CPT

## 2023-10-09 PROCEDURE — 96372 THER/PROPH/DIAG INJ SC/IM: CPT

## 2023-10-09 PROCEDURE — 36415 COLL VENOUS BLD VENIPUNCTURE: CPT

## 2023-10-09 PROCEDURE — 86920 COMPATIBILITY TEST SPIN: CPT

## 2023-10-09 RX ORDER — ALBUTEROL SULFATE 0.83 MG/ML
3 SOLUTION RESPIRATORY (INHALATION) AS NEEDED
Status: CANCELLED | OUTPATIENT
Start: 2023-10-16

## 2023-10-09 RX ORDER — EPINEPHRINE 0.3 MG/.3ML
0.3 INJECTION SUBCUTANEOUS EVERY 5 MIN PRN
Status: CANCELLED | OUTPATIENT
Start: 2023-10-16

## 2023-10-09 RX ORDER — DIPHENHYDRAMINE HYDROCHLORIDE 50 MG/ML
50 INJECTION INTRAMUSCULAR; INTRAVENOUS AS NEEDED
Status: CANCELLED | OUTPATIENT
Start: 2023-10-16

## 2023-10-09 RX ORDER — ALBUTEROL SULFATE 0.83 MG/ML
3 SOLUTION RESPIRATORY (INHALATION) AS NEEDED
Status: CANCELLED | OUTPATIENT
Start: 2023-10-10

## 2023-10-09 RX ORDER — DIPHENHYDRAMINE HYDROCHLORIDE 50 MG/ML
50 INJECTION INTRAMUSCULAR; INTRAVENOUS AS NEEDED
Status: CANCELLED | OUTPATIENT
Start: 2023-10-10

## 2023-10-09 RX ORDER — FAMOTIDINE 10 MG/ML
20 INJECTION INTRAVENOUS ONCE AS NEEDED
Status: CANCELLED | OUTPATIENT
Start: 2023-10-16

## 2023-10-09 RX ORDER — EPINEPHRINE 0.3 MG/.3ML
0.3 INJECTION SUBCUTANEOUS EVERY 5 MIN PRN
Status: CANCELLED | OUTPATIENT
Start: 2023-10-10

## 2023-10-09 RX ORDER — FAMOTIDINE 10 MG/ML
20 INJECTION INTRAVENOUS ONCE AS NEEDED
Status: CANCELLED | OUTPATIENT
Start: 2023-10-10

## 2023-10-09 RX ADMIN — ERYTHROPOIETIN 80000 UNITS: 40000 INJECTION, SOLUTION INTRAVENOUS; SUBCUTANEOUS at 15:25

## 2023-10-09 ASSESSMENT — PAIN SCALES - GENERAL: PAINLEVEL: 0-NO PAIN

## 2023-10-09 NOTE — PROGRESS NOTES
Patient here for Procrit injection, tolerated well. Patient to return 10/16 for next injection. Patient denies any questions at this time. Patient discharged in stable condition.

## 2023-10-10 ENCOUNTER — INFUSION (OUTPATIENT)
Dept: INFUSION THERAPY | Facility: HOSPITAL | Age: 78
End: 2023-10-10
Payer: MEDICARE

## 2023-10-10 VITALS
OXYGEN SATURATION: 96 % | DIASTOLIC BLOOD PRESSURE: 73 MMHG | SYSTOLIC BLOOD PRESSURE: 166 MMHG | HEART RATE: 94 BPM | RESPIRATION RATE: 16 BRPM | TEMPERATURE: 98.4 F

## 2023-10-10 DIAGNOSIS — D64.9 ANEMIA, UNSPECIFIED TYPE: ICD-10-CM

## 2023-10-10 DIAGNOSIS — D53.1 OTHER MEGALOBLASTIC ANEMIA: ICD-10-CM

## 2023-10-10 DIAGNOSIS — D46.9 MYELODYSPLASTIC SYNDROME (MULTI): ICD-10-CM

## 2023-10-10 PROCEDURE — P9040 RBC LEUKOREDUCED IRRADIATED: HCPCS

## 2023-10-10 PROCEDURE — 36430 TRANSFUSION BLD/BLD COMPNT: CPT

## 2023-10-10 RX ORDER — ALBUTEROL SULFATE 0.83 MG/ML
3 SOLUTION RESPIRATORY (INHALATION) AS NEEDED
Status: CANCELLED | OUTPATIENT
Start: 2023-10-10

## 2023-10-10 RX ORDER — FAMOTIDINE 10 MG/ML
20 INJECTION INTRAVENOUS ONCE AS NEEDED
Status: CANCELLED | OUTPATIENT
Start: 2023-10-10

## 2023-10-10 RX ORDER — SODIUM CHLORIDE 0.9 % (FLUSH) 0.9 %
10 SYRINGE (ML) INJECTION AS NEEDED
Status: DISCONTINUED | OUTPATIENT
Start: 2023-10-10 | End: 2024-03-05 | Stop reason: HOSPADM

## 2023-10-10 RX ORDER — METHYLPREDNISOLONE SODIUM SUCCINATE 40 MG/ML
40 INJECTION INTRAMUSCULAR; INTRAVENOUS AS NEEDED
Status: CANCELLED | OUTPATIENT
Start: 2023-10-10

## 2023-10-10 RX ORDER — FAMOTIDINE 10 MG/ML
20 INJECTION INTRAVENOUS ONCE AS NEEDED
Status: DISCONTINUED | OUTPATIENT
Start: 2023-10-10 | End: 2024-04-16

## 2023-10-10 RX ORDER — EPINEPHRINE 0.3 MG/.3ML
0.3 INJECTION SUBCUTANEOUS EVERY 5 MIN PRN
Status: CANCELLED | OUTPATIENT
Start: 2023-10-10

## 2023-10-10 RX ORDER — EPINEPHRINE 0.3 MG/.3ML
0.3 INJECTION SUBCUTANEOUS EVERY 5 MIN PRN
Status: DISCONTINUED | OUTPATIENT
Start: 2023-10-10 | End: 2024-04-16

## 2023-10-10 RX ORDER — DIPHENHYDRAMINE HYDROCHLORIDE 50 MG/ML
50 INJECTION INTRAMUSCULAR; INTRAVENOUS AS NEEDED
Status: DISCONTINUED | OUTPATIENT
Start: 2023-10-10 | End: 2024-04-16

## 2023-10-10 RX ORDER — HEPARIN 100 UNIT/ML
500 SYRINGE INTRAVENOUS AS NEEDED
Status: CANCELLED | OUTPATIENT
Start: 2023-10-10

## 2023-10-10 RX ORDER — DIPHENHYDRAMINE HYDROCHLORIDE 50 MG/ML
50 INJECTION INTRAMUSCULAR; INTRAVENOUS AS NEEDED
Status: CANCELLED | OUTPATIENT
Start: 2023-10-10

## 2023-10-10 RX ORDER — METHYLPREDNISOLONE SODIUM SUCCINATE 40 MG/ML
40 INJECTION INTRAMUSCULAR; INTRAVENOUS AS NEEDED
Status: DISCONTINUED | OUTPATIENT
Start: 2023-10-10 | End: 2024-04-16

## 2023-10-10 RX ORDER — ALBUTEROL SULFATE 0.83 MG/ML
3 SOLUTION RESPIRATORY (INHALATION) AS NEEDED
Status: DISCONTINUED | OUTPATIENT
Start: 2023-10-10 | End: 2024-04-16

## 2023-10-10 RX ORDER — HEPARIN SODIUM,PORCINE/PF 10 UNIT/ML
50 SYRINGE (ML) INTRAVENOUS AS NEEDED
Status: CANCELLED | OUTPATIENT
Start: 2023-10-10

## 2023-10-10 SDOH — ECONOMIC STABILITY: FOOD INSECURITY: WITHIN THE PAST 12 MONTHS, THE FOOD YOU BOUGHT JUST DIDN'T LAST AND YOU DIDN'T HAVE MONEY TO GET MORE.: NEVER TRUE

## 2023-10-10 SDOH — ECONOMIC STABILITY: FOOD INSECURITY: WITHIN THE PAST 12 MONTHS, YOU WORRIED THAT YOUR FOOD WOULD RUN OUT BEFORE YOU GOT MONEY TO BUY MORE.: NEVER TRUE

## 2023-10-10 ASSESSMENT — COLUMBIA-SUICIDE SEVERITY RATING SCALE - C-SSRS
6. HAVE YOU EVER DONE ANYTHING, STARTED TO DO ANYTHING, OR PREPARED TO DO ANYTHING TO END YOUR LIFE?: NO
1. IN THE PAST MONTH, HAVE YOU WISHED YOU WERE DEAD OR WISHED YOU COULD GO TO SLEEP AND NOT WAKE UP?: NO
2. HAVE YOU ACTUALLY HAD ANY THOUGHTS OF KILLING YOURSELF?: NO

## 2023-10-10 ASSESSMENT — ENCOUNTER SYMPTOMS
DEPRESSION: 0
LOSS OF SENSATION IN FEET: 0
OCCASIONAL FEELINGS OF UNSTEADINESS: 1

## 2023-10-10 ASSESSMENT — PATIENT HEALTH QUESTIONNAIRE - PHQ9
1. LITTLE INTEREST OR PLEASURE IN DOING THINGS: NOT AT ALL
SUM OF ALL RESPONSES TO PHQ9 QUESTIONS 1 AND 2: 0
2. FEELING DOWN, DEPRESSED OR HOPELESS: NOT AT ALL

## 2023-10-10 NOTE — PROGRESS NOTES
Patient was not seen on this date. I did update orders for her to receive treatment in the infusion center.

## 2023-10-11 LAB
BLOOD EXPIRATION DATE: NORMAL
BLOOD EXPIRATION DATE: NORMAL
DISPENSE STATUS: NORMAL
DISPENSE STATUS: NORMAL
PRODUCT BLOOD TYPE: 600
PRODUCT BLOOD TYPE: 6200
PRODUCT CODE: NORMAL
PRODUCT CODE: NORMAL
UNIT ABO: NORMAL
UNIT ABO: NORMAL
UNIT NUMBER: NORMAL
UNIT NUMBER: NORMAL
UNIT RH: NORMAL
UNIT RH: NORMAL
UNIT VOLUME: 350
UNIT VOLUME: 350
XM INTEP: NORMAL
XM INTEP: NORMAL

## 2023-10-12 ENCOUNTER — OFFICE VISIT (OUTPATIENT)
Dept: PRIMARY CARE | Facility: CLINIC | Age: 78
End: 2023-10-12
Payer: MEDICARE

## 2023-10-12 VITALS
BODY MASS INDEX: 30.39 KG/M2 | DIASTOLIC BLOOD PRESSURE: 72 MMHG | SYSTOLIC BLOOD PRESSURE: 132 MMHG | TEMPERATURE: 97.3 F | WEIGHT: 166 LBS | OXYGEN SATURATION: 93 % | HEART RATE: 78 BPM

## 2023-10-12 DIAGNOSIS — Z00.00 ROUTINE GENERAL MEDICAL EXAMINATION AT HEALTH CARE FACILITY: Primary | ICD-10-CM

## 2023-10-12 DIAGNOSIS — Z87.898 HISTORY OF VERTIGO: ICD-10-CM

## 2023-10-12 DIAGNOSIS — E66.9 CLASS 1 OBESITY WITHOUT SERIOUS COMORBIDITY WITH BODY MASS INDEX (BMI) OF 30.0 TO 30.9 IN ADULT, UNSPECIFIED OBESITY TYPE: ICD-10-CM

## 2023-10-12 DIAGNOSIS — Z12.31 ENCOUNTER FOR SCREENING MAMMOGRAM FOR BREAST CANCER: ICD-10-CM

## 2023-10-12 DIAGNOSIS — Z23 NEED FOR VACCINATION: ICD-10-CM

## 2023-10-12 DIAGNOSIS — R42 DIZZINESS, NONSPECIFIC: ICD-10-CM

## 2023-10-12 PROBLEM — F39 MOOD DISORDER (CMS-HCC): Status: RESOLVED | Noted: 2023-05-04 | Resolved: 2023-10-12

## 2023-10-12 PROCEDURE — 90662 IIV NO PRSV INCREASED AG IM: CPT | Performed by: STUDENT IN AN ORGANIZED HEALTH CARE EDUCATION/TRAINING PROGRAM

## 2023-10-12 PROCEDURE — 99213 OFFICE O/P EST LOW 20 MIN: CPT | Performed by: STUDENT IN AN ORGANIZED HEALTH CARE EDUCATION/TRAINING PROGRAM

## 2023-10-12 PROCEDURE — 1159F MED LIST DOCD IN RCRD: CPT | Performed by: STUDENT IN AN ORGANIZED HEALTH CARE EDUCATION/TRAINING PROGRAM

## 2023-10-12 PROCEDURE — 1160F RVW MEDS BY RX/DR IN RCRD: CPT | Performed by: STUDENT IN AN ORGANIZED HEALTH CARE EDUCATION/TRAINING PROGRAM

## 2023-10-12 PROCEDURE — 1036F TOBACCO NON-USER: CPT | Performed by: STUDENT IN AN ORGANIZED HEALTH CARE EDUCATION/TRAINING PROGRAM

## 2023-10-12 PROCEDURE — G0439 PPPS, SUBSEQ VISIT: HCPCS | Performed by: STUDENT IN AN ORGANIZED HEALTH CARE EDUCATION/TRAINING PROGRAM

## 2023-10-12 PROCEDURE — 1170F FXNL STATUS ASSESSED: CPT | Performed by: STUDENT IN AN ORGANIZED HEALTH CARE EDUCATION/TRAINING PROGRAM

## 2023-10-12 PROCEDURE — 3075F SYST BP GE 130 - 139MM HG: CPT | Performed by: STUDENT IN AN ORGANIZED HEALTH CARE EDUCATION/TRAINING PROGRAM

## 2023-10-12 PROCEDURE — 1126F AMNT PAIN NOTED NONE PRSNT: CPT | Performed by: STUDENT IN AN ORGANIZED HEALTH CARE EDUCATION/TRAINING PROGRAM

## 2023-10-12 PROCEDURE — 3078F DIAST BP <80 MM HG: CPT | Performed by: STUDENT IN AN ORGANIZED HEALTH CARE EDUCATION/TRAINING PROGRAM

## 2023-10-12 PROCEDURE — G0008 ADMIN INFLUENZA VIRUS VAC: HCPCS | Performed by: STUDENT IN AN ORGANIZED HEALTH CARE EDUCATION/TRAINING PROGRAM

## 2023-10-12 RX ORDER — MECLIZINE HCL 12.5 MG 12.5 MG/1
12.5 TABLET ORAL 2 TIMES DAILY PRN
Qty: 15 TABLET | Refills: 0 | Status: SHIPPED | OUTPATIENT
Start: 2023-10-12 | End: 2024-03-19 | Stop reason: ALTCHOICE

## 2023-10-12 ASSESSMENT — ENCOUNTER SYMPTOMS
OCCASIONAL FEELINGS OF UNSTEADINESS: 0
LOSS OF SENSATION IN FEET: 0
DEPRESSION: 0

## 2023-10-12 ASSESSMENT — ACTIVITIES OF DAILY LIVING (ADL)
GROCERY_SHOPPING: INDEPENDENT
BATHING: INDEPENDENT
TAKING_MEDICATION: INDEPENDENT
DOING_HOUSEWORK: INDEPENDENT
DRESSING: INDEPENDENT
MANAGING_FINANCES: INDEPENDENT

## 2023-10-12 ASSESSMENT — PATIENT HEALTH QUESTIONNAIRE - PHQ9
SUM OF ALL RESPONSES TO PHQ9 QUESTIONS 1 AND 2: 0
1. LITTLE INTEREST OR PLEASURE IN DOING THINGS: NOT AT ALL
2. FEELING DOWN, DEPRESSED OR HOPELESS: NOT AT ALL

## 2023-10-12 NOTE — PATIENT INSTRUCTIONS

## 2023-10-12 NOTE — PROGRESS NOTES
Subjective   Patient ID: Tana Hargrove is a 78 y.o. female who presents for Follow-up (Pt is here for FUV. Pt has no other concern./) and Medicare Annual Wellness Visit Initial (Pt is due for [facility] visit.).  Reports she is doing good except having some issues with vertigo, reports h/o vertigo in the past too. She feels room spinning sensation. Her io /72 with HR 78. Denies episodes of fall or presyncopal episode. She has h/o severe anemia, following w/ heme/onc wkl, receives either bld transfusion or iron infusion. Recent Hgb 6.5 (10/09/23) s/p 2 units of pRBCs. She feels okay today; denies bleeding form any sources.     Past Medical, Surgical, and Family History reviewed and updated in chart.       HPI  #HM stuffs  Screening tests:  - Mammogram (age 40-74): 10/22; due now   - Bone scan (age 65 & older): declined   - Pap smear (age 21-65): N/a  - Colonoscopy (age 45-75): N/A; was told no more.   - Lipid profile: utd     Primary prevention:  - Flu shot: okay to get today   - RSV (age > 60): will get at pharmacy   - COVID vaccines: UTD  - Tdap shot: UTD   - Shingles shot (age >50): rec to get at pharmacy   - Prevnar 20: UTD   - Statin (age 40-65 or high risk): taking     Counseling:   - ETOH (age>18):  none   - Smoking: none     Patient Care Team:  Carlos Higgins MD as PCP - General (Family Medicine)  Tay Richmond DO as PCP - [employer] PCP     Review of Systems   Constitutional:  Negative for chills, fatigue, fever and unexpected weight change.   HENT: Negative.     Respiratory:  Negative for cough, shortness of breath and wheezing.    Cardiovascular:  Negative for chest pain, palpitations and leg swelling.   Gastrointestinal:  Negative for abdominal pain, constipation, diarrhea, nausea and vomiting.   Musculoskeletal: Negative.    Skin:  Negative for color change and rash.   Neurological:  Negative for dizziness and headaches.   Psychiatric/Behavioral:  Negative for behavioral  problems and confusion.        Objective   Vitals:  /72 (BP Location: Right arm, Patient Position: Sitting, BP Cuff Size: Adult)   Pulse 78   Temp 36.3 °C (97.3 °F)   Wt 75.3 kg (166 lb)   SpO2 93%   BMI 30.39 kg/m²       Physical Exam  Vitals and nursing note reviewed.   Constitutional:       Appearance: Normal appearance. She is obese.   HENT:      Right Ear: Tympanic membrane normal.      Left Ear: Tympanic membrane normal.   Eyes:      Extraocular Movements: Extraocular movements intact.      Pupils: Pupils are equal, round, and reactive to light.   Cardiovascular:      Rate and Rhythm: Normal rate and regular rhythm.      Pulses: Normal pulses.      Heart sounds: Normal heart sounds.   Pulmonary:      Effort: Pulmonary effort is normal. No respiratory distress.      Breath sounds: Normal breath sounds.   Abdominal:      General: Abdomen is flat. Bowel sounds are normal.      Palpations: Abdomen is soft.   Musculoskeletal:         General: Normal range of motion.   Neurological:      General: No focal deficit present.      Mental Status: She is alert and oriented to person, place, and time.      Cranial Nerves: No cranial nerve deficit.      Sensory: No sensory deficit.      Motor: No weakness.   Psychiatric:         Mood and Affect: Mood normal.         Behavior: Behavior normal.       Assessment/Plan   She is here for [facility], FU visit & also c/o dizziness. Her dizziness likely 2/2 severe anemia, less likely vertigo but couldn't r/o completely in the setting of prev hx. We will do trial of meclizine 12.5 mg 2-3 x daily prn; however highly enc to FW Heme/Onc as schd wkly for bld draw & result FU. She is otherwise clinically & vitally stable. Other plan as follows     # [facility] wellness # HM visit   - UTD on POA/living will paper work   - UTD on immunization per emr: rec'd flu shot   - UTD on age appropriate screenings as listed above in [facility] summary: ordered mammogram.   - refer [facility] summary above for  detail  - BW ordered as listed in emr    Problem List Items Addressed This Visit    None  Visit Diagnoses       Routine general medical examination at health care facility    -  Primary    History of vertigo        Relevant Medications    meclizine (Antivert) 12.5 mg tablet    Dizziness, nonspecific        Relevant Medications    meclizine (Antivert) 12.5 mg tablet    Class 1 obesity without serious comorbidity with body mass index (BMI) of 30.0 to 30.9 in adult, unspecified obesity type        Need for vaccination        Relevant Orders    Flu vaccine, high dose seasonal, preservative free (Completed)    Encounter for screening mammogram for breast cancer        Relevant Orders    BI mammo bilateral screening tomosynthesis           Patient was identified as a fall risk. Risk prevention instructions provided.  Rtc 6 mo for JOJO Higgins MD    Joey, Family Medicine

## 2023-10-13 ASSESSMENT — ENCOUNTER SYMPTOMS
COLOR CHANGE: 0
VOMITING: 0
HEADACHES: 0
ABDOMINAL PAIN: 0
CHILLS: 0
UNEXPECTED WEIGHT CHANGE: 0
COUGH: 0
DIARRHEA: 0
MUSCULOSKELETAL NEGATIVE: 1
PALPITATIONS: 0
NAUSEA: 0
SHORTNESS OF BREATH: 0
CONSTIPATION: 0
CONFUSION: 0
WHEEZING: 0
DIZZINESS: 0
FEVER: 0
FATIGUE: 0

## 2023-10-16 ENCOUNTER — INFUSION (OUTPATIENT)
Dept: HEMATOLOGY/ONCOLOGY | Facility: CLINIC | Age: 78
End: 2023-10-16
Payer: MEDICARE

## 2023-10-16 ENCOUNTER — OFFICE VISIT (OUTPATIENT)
Dept: ORTHOPEDIC SURGERY | Facility: CLINIC | Age: 78
End: 2023-10-16
Payer: MEDICARE

## 2023-10-16 ENCOUNTER — LAB (OUTPATIENT)
Dept: LAB | Facility: HOSPITAL | Age: 78
End: 2023-10-16
Payer: MEDICARE

## 2023-10-16 VITALS
DIASTOLIC BLOOD PRESSURE: 69 MMHG | TEMPERATURE: 97.7 F | OXYGEN SATURATION: 97 % | SYSTOLIC BLOOD PRESSURE: 144 MMHG | BODY MASS INDEX: 31.38 KG/M2 | RESPIRATION RATE: 16 BRPM | HEIGHT: 61 IN

## 2023-10-16 VITALS — HEIGHT: 61 IN | BODY MASS INDEX: 31.34 KG/M2 | WEIGHT: 166 LBS

## 2023-10-16 DIAGNOSIS — Z96.651 STATUS POST RIGHT KNEE REPLACEMENT: Primary | ICD-10-CM

## 2023-10-16 DIAGNOSIS — D46.9 MYELODYSPLASTIC SYNDROME (MULTI): ICD-10-CM

## 2023-10-16 DIAGNOSIS — M25.561 CHRONIC PAIN OF RIGHT KNEE: ICD-10-CM

## 2023-10-16 DIAGNOSIS — G89.29 CHRONIC PAIN OF RIGHT KNEE: ICD-10-CM

## 2023-10-16 LAB
BASOPHILS # BLD AUTO: 0.06 X10*3/UL (ref 0–0.1)
BASOPHILS NFR BLD AUTO: 0.9 %
DACRYOCYTES BLD QL SMEAR: NORMAL
EOSINOPHIL # BLD AUTO: 0.22 X10*3/UL (ref 0–0.4)
EOSINOPHIL NFR BLD AUTO: 3.4 %
ERYTHROCYTE [DISTWIDTH] IN BLOOD BY AUTOMATED COUNT: 21.1 % (ref 11.5–14.5)
HCT VFR BLD AUTO: 27.4 % (ref 36–46)
HGB BLD-MCNC: 8.7 G/DL (ref 12–16)
HYPOCHROMIA BLD QL SMEAR: NORMAL
IMM GRANULOCYTES # BLD AUTO: 0.02 X10*3/UL (ref 0–0.5)
IMM GRANULOCYTES NFR BLD AUTO: 0.3 % (ref 0–0.9)
LYMPHOCYTES # BLD AUTO: 1.51 X10*3/UL (ref 0.8–3)
LYMPHOCYTES NFR BLD AUTO: 23.5 %
MCH RBC QN AUTO: 30.6 PG (ref 26–34)
MCHC RBC AUTO-ENTMCNC: 31.8 G/DL (ref 32–36)
MCV RBC AUTO: 97 FL (ref 80–100)
MONOCYTES # BLD AUTO: 0.41 X10*3/UL (ref 0.05–0.8)
MONOCYTES NFR BLD AUTO: 6.4 %
NEUTROPHILS # BLD AUTO: 4.21 X10*3/UL (ref 1.6–5.5)
NEUTROPHILS NFR BLD AUTO: 65.5 %
NRBC BLD-RTO: ABNORMAL /100{WBCS}
OVALOCYTES BLD QL SMEAR: NORMAL
PLATELET # BLD AUTO: 251 X10*3/UL (ref 150–450)
PMV BLD AUTO: 11.4 FL (ref 7.5–11.5)
POLYCHROMASIA BLD QL SMEAR: NORMAL
RBC # BLD AUTO: 2.84 X10*6/UL (ref 4–5.2)
RBC MORPH BLD: NORMAL
WBC # BLD AUTO: 6.4 X10*3/UL (ref 4.4–11.3)

## 2023-10-16 PROCEDURE — 6350000001 HC RX 635 EPOETIN >10,000 UNITS: Mod: JZ,JG | Performed by: NURSE PRACTITIONER

## 2023-10-16 PROCEDURE — 1126F AMNT PAIN NOTED NONE PRSNT: CPT | Performed by: ORTHOPAEDIC SURGERY

## 2023-10-16 PROCEDURE — 85025 COMPLETE CBC W/AUTO DIFF WBC: CPT

## 2023-10-16 PROCEDURE — 1036F TOBACCO NON-USER: CPT | Performed by: ORTHOPAEDIC SURGERY

## 2023-10-16 PROCEDURE — 1159F MED LIST DOCD IN RCRD: CPT | Performed by: ORTHOPAEDIC SURGERY

## 2023-10-16 PROCEDURE — 36415 COLL VENOUS BLD VENIPUNCTURE: CPT

## 2023-10-16 PROCEDURE — 99214 OFFICE O/P EST MOD 30 MIN: CPT | Performed by: ORTHOPAEDIC SURGERY

## 2023-10-16 PROCEDURE — 96372 THER/PROPH/DIAG INJ SC/IM: CPT

## 2023-10-16 PROCEDURE — 1160F RVW MEDS BY RX/DR IN RCRD: CPT | Performed by: ORTHOPAEDIC SURGERY

## 2023-10-16 RX ORDER — ALBUTEROL SULFATE 0.83 MG/ML
3 SOLUTION RESPIRATORY (INHALATION) AS NEEDED
Status: CANCELLED | OUTPATIENT
Start: 2023-10-23

## 2023-10-16 RX ORDER — DIPHENHYDRAMINE HYDROCHLORIDE 50 MG/ML
50 INJECTION INTRAMUSCULAR; INTRAVENOUS AS NEEDED
Status: CANCELLED | OUTPATIENT
Start: 2023-10-23

## 2023-10-16 RX ORDER — EPINEPHRINE 0.3 MG/.3ML
0.3 INJECTION SUBCUTANEOUS EVERY 5 MIN PRN
Status: CANCELLED | OUTPATIENT
Start: 2023-10-23

## 2023-10-16 RX ORDER — FAMOTIDINE 10 MG/ML
20 INJECTION INTRAVENOUS ONCE AS NEEDED
Status: CANCELLED | OUTPATIENT
Start: 2023-10-23

## 2023-10-16 RX ADMIN — ERYTHROPOIETIN 80000 UNITS: 40000 INJECTION, SOLUTION INTRAVENOUS; SUBCUTANEOUS at 15:33

## 2023-10-16 NOTE — PROGRESS NOTES
Patient here for retacrit injection, tolerated well. Patient to return 10/23 for labs and treatment. Patient denies any questions at this time. Patient discharged in stable condition.

## 2023-10-16 NOTE — PROGRESS NOTES
PRIMARY CARE PHYSICIAN: Carlos Higgins MD  REFERRING PROVIDER: Dr Gisela AMBROSE    CONSULT ORTHOPAEDIC: Knee Evaluation        ASSESSMENT & PLAN    IMPRESSION:   1.  History of right total knee arthroplasty  2.  Instability of right total knee    PLAN:   Discussed with patient findings above.  Currently her only symptom presentation is pain.  She does have clinical instability on examination and discussed this may be due to previous avulsion fracture of her LCL noted on her x-rays.  We did review her x-rays with her and stated that she might of possibly had a small avulsion off the lateral femoral condyle from a fall and this is causing some instability her knee however at this time she does not complain of instability only complains of pain that seems to localize to her extensor mechanism.  I recommended that at minimum we try formal physical therapy to see if this helps improve her symptoms given that a revision operation would be a staged effort as she would need removal of her intramedullary nail and a revision of the total knee arthroplasty.  Patient would not like to proceed with surgery would like to try a brace at this time and formal physical therapy to see if this helps her symptoms.  She may continue activities as tolerated and follow-up as needed.      SUBJECTIVE  CHIEF COMPLAINT:   Chief Complaint   Patient presents with    Right Knee - Pain        HPI: Tana Hargrove is a 78 y.o. patient. Tana Hargrove has had progressive problems with theirright knee over the past 2 months. They do report any trauma. She had a fall in August and went to ER.  She states she continues to have pain.  They do not report any constant or progressive numbness or tingling in their legs. Their symptoms are interfering with activities which include aching pain when she bends her knee or gets up from sitting.     FUNCTIONAL STATUS: occasionally limited.  AMBULATORY STATUS: Ambulates with rollator  PREVIOUS TREATMENTS: Therapy at  "home with no improvement  HISTORY OF SURGERY ON AFFECTED KNEE(S): Yes Both knees are replaced.  By Dr Padron in 2001.  She also had a right hip IM nailing after a femur fracture in 05/11/22      REVIEW OF SYSTEMS  Constitutional: See HPI for pain assessment, No significant weight loss, recent trauma  Cardiovascular: No chest pain, shortness of breath  Respiratory: No difficulty breathing, cough  Gastrointestinal: No nausea, vomiting, diarrhea, constipation  Musculoskeletal: Noted in HPI, positive for pain, restricted motion, stiffness  Integumentary: No rashes, easy bruising, redness   Neurological: no numbness or tingling in extremities, no gait disturbances   Psychiatric: No mood changes, memory changes, social issues  Heme/Lymph: no excessive swelling, easy bruising, excessive bleeding  ENT: No hearing changes  Eyes: No vision changes    Past Medical History:   Diagnosis Date    Abnormal levels of other serum enzymes     Elevated liver enzymes    Acute kidney failure (CMS/HCC)     Anemia     CKD (chronic kidney disease)     COPD (chronic obstructive pulmonary disease) (CMS/HCC)     Disease of blood and blood-forming organs, unspecified     Bone marrow disorder    HLD (hyperlipidemia)     Personal history of other diseases of the musculoskeletal system and connective tissue     History of muscle pain    Personal history of other specified conditions     History of insomnia    Personal history of other specified conditions     History of edema    Type 2 diabetes mellitus (CMS/HCC)         Allergies   Allergen Reactions    Codeine Unknown     \"Feel like I'm floating\"    Could not speak did not feel good    lightheaded    Hydrocodone-Acetaminophen Unknown     \"I went into shock\" \" States she didn't know where she was after taking 1 dose.    Other reaction(s): Other (See Comments), Other: See Comments, shock   Went into shock   \"I went into shock\" \" States she didn't know where she was after taking 1 dose.    Went into " shock    Meperidine (Pf) Unknown    Tramadol Other, Unknown and Itching    Adhesive Tape-Silicones Other    Hydrocodone Other    Piperacillin-Tazobactam Diarrhea and Headache    Meperidine Unknown and Nausea And Vomiting     Could talk did not feel well        Past Surgical History:   Procedure Laterality Date    MR HEAD ANGIO WO IV CONTRAST  11/20/2020    MR HEAD ANGIO WO IV CONTRAST 11/20/2020 [facility]    MR NECK ANGIO WO IV CONTRAST  11/20/2020    MR NECK ANGIO WO IV CONTRAST 11/20/2020 [facility] CLINICAL LEGACY    OTHER SURGICAL HISTORY  12/13/2019    Oophorectomy bilateral    OTHER SURGICAL HISTORY  12/13/2019    Tubal ligation    OTHER SURGICAL HISTORY  12/13/2019    Knee replacement    OTHER SURGICAL HISTORY  12/13/2019    Shoulder surgery    OTHER SURGICAL HISTORY  12/13/2019    Hysterectomy    OTHER SURGICAL HISTORY  12/13/2019    Lumpectomy    OTHER SURGICAL HISTORY  12/13/2019    Foot surgery    OTHER SURGICAL HISTORY  04/16/2021    Back surgery        No family history on file.     Social History     Socioeconomic History    Marital status:      Spouse name: Not on file    Number of children: Not on file    Years of education: Not on file    Highest education level: Not on file   Occupational History    Not on file   Tobacco Use    Smoking status: Never    Smokeless tobacco: Never   Vaping Use    Vaping Use: Never used   Substance and Sexual Activity    Alcohol use: Not Currently    Drug use: Never    Sexual activity: Not on file   Other Topics Concern    Not on file   Social History Narrative    Not on file     Social Determinants of Health     Financial Resource Strain: Not on file   Food Insecurity: No Food Insecurity (10/10/2023)    Hunger Vital Sign     Worried About Running Out of Food in the Last Year: Never true     Ran Out of Food in the Last Year: Never true   Transportation Needs: Not on file   Physical Activity: Not on file   Stress: Not on file   Social Connections: Not on file    Intimate Partner Violence: Not on file   Housing Stability: Not on file        CURRENT MEDICATIONS:   Current Outpatient Medications   Medication Sig Dispense Refill    acetaminophen (Tylenol) 500 mg tablet Take by mouth every 8 hours if needed.      albuterol 90 mcg/actuation inhaler Inhale 4 times a day as needed.      allopurinol (Zyloprim) 100 mg tablet Take 1 tablet (100 mg) by mouth once daily. 90 tablet 1    amLODIPine (Norvasc) 5 mg tablet Take 1 tablet (5 mg) by mouth once daily.      aspirin 81 mg EC tablet Take 1 tablet (81 mg) by mouth once daily.      bumetanide (Bumex) 1 mg tablet Take 1 tablet (1 mg) by mouth once daily.      cholecalciferol (Vitamin D-3) 50 MCG (2000 UT) tablet Take 1 tablet (50 mcg) by mouth once daily.      cyanocobalamin (Vitamin B-12) 1,000 mcg tablet Take 1 tablet (1,000 mcg) by mouth once daily.      epoetin alejandra (Procrit) 10,000 unit/mL injection Pt gets every Monday.      fluticasone (Flonase) 50 mcg/actuation nasal spray Administer into affected nostril(s).      furosemide (Lasix) 20 mg tablet TAKE 1 TABLET BY MOUTH ONCE DAILY 7 tablet 0    glimepiride (Amaryl) 4 mg tablet Take 1 tablet (4 mg) by mouth once daily.      lansoprazole (Prevacid) 30 mg DR capsule Take 1 capsule (30 mg) by mouth once daily.      lisinopril 40 mg tablet Take 1 tablet (40 mg) by mouth once daily.      meclizine (Antivert) 12.5 mg tablet Take 1 tablet (12.5 mg) by mouth 2 times a day as needed for dizziness. 15 tablet 0    metoprolol tartrate (Lopressor) 25 mg tablet Take by mouth.      pantoprazole (ProtoNix) 40 mg EC tablet Take 1 tablet (40 mg) by mouth once daily.      pioglitazone (Actos) 15 mg tablet Take 1 tablet (15 mg) by mouth once daily. 90 tablet 0    pravastatin (Pravachol) 40 mg tablet Take 1 tablet (40 mg) by mouth once daily at bedtime. 90 tablet 0    pregabalin (Lyrica) 100 mg capsule TAKE ONE CAPSULE BY MOUTH TWICE DAILY @9AM & 5PM 60 capsule 0    SITagliptin phosphate (Januvia)  100 mg tablet Take 1 tablet (100 mg) by mouth once daily. 90 tablet 0     Current Facility-Administered Medications   Medication Dose Route Frequency Provider Last Rate Last Admin    sodium chloride 0.9% flush 10 mL  10 mL intravenous PRN Pinky ALEX Casal, APRN-CNP         Facility-Administered Medications Ordered in Other Visits   Medication Dose Route Frequency Provider Last Rate Last Admin    albuterol 2.5 mg /3 mL (0.083 %) nebulizer solution 3 mL  3 mL nebulization PRN Pinky M Casal, APRN-CNP        dextrose 5 % in water (D5W) bolus  500 mL intravenous PRN Pinky M Casal, APRN-CNP        diphenhydrAMINE (BENADryl) injection 50 mg  50 mg intravenous PRN Pinky M Casal, APRN-CNP        EPINEPHrine (Epipen) injection syringe 0.3 mg  0.3 mg intramuscular q5 min PRN Pinky M Casal, APRN-CNP        epoetin alejandra (Epogen,Procrit) injection 40,000 Units  630 Units/kg subcutaneous Once Pinky  Casal, APRN-CNP        famotidine PF (Pepcid) injection 20 mg  20 mg intravenous Once PRN Pinky  Casal, APRN-CNP        methylPREDNISolone sod suc / (SOLU-Medrol) 40 mg injection 40 mg  40 mg intravenous PRN Pinky  Casal, APRN-CNP        sodium chloride 0.9 % bolus 500 mL  500 mL intravenous PRN Pinky  Casal, APRN-CNP            OBJECTIVE    PHYSICAL EXAM      10/10/2023     1:47 PM 10/10/2023     2:45 PM 10/10/2023     3:45 PM 10/10/2023     3:59 PM 10/10/2023     4:17 PM 10/12/2023    10:10 AM 10/12/2023    10:11 AM   Vitals   Systolic 148 160 168 180 166  132   Diastolic 70 74 68 68 73  72   Heart Rate 88 89 92 94 94  78   Temp 36.9 °C (98.4 °F) 36.9 °C (98.4 °F) 36.8 °C (98.3 °F) 36.7 °C (98.1 °F) 36.9 °C (98.4 °F)  36.3 °C (97.3 °F)   Resp 16 16 16 16 16     Weight (lb)      166 166   BMI      30.39 kg/m2 30.39 kg/m2   BSA (m2)      1.81 m2 1.81 m2   Visit Report      Report Report      There is no height or weight on file to calculate BMI.    GENERAL: A/Ox3, NAD. Appears healthy, well nourished  CARDIAC:  regular rate  LUNGS: Breathing non-labored    MUSCULOSKELETAL:  Laterality: left Knee Exam  - Incision: Midline incisions well-healed  - Palpation: Nontender along incision.  Some diffuse tenderness over her lateral and medial femoral condyle and extensor mechanism  - Effusion: None evident  - ROM: 0 to 115 degrees  - Stability: Anterior/Posterior stable, stable to valgus stress with some increased laxity noted with varus stress when compared to contralateral side but with noted firm endpoint  - compartments soft, negative homans    NEUROVASCULAR:  - Neurovascular Status: sensation intact to light touch distally  - Capillary refill brisk at extremities, Bilateral dorsalis pedis pulse 2+      IMAGING:  Multiple views of the affected right knee(s) demonstrate: No signs of loosening failure of right total knee arthroplasty.  Intramedullary fixation of the femur is noted as well with healed fracture.  There is an avulsion fracture noted at the lateral femoral condyle when compared to prior films.   X-rays were personally reviewed and interpreted by me.  Radiology reports were reviewed by me as well, if readily available at the time.        Keli Sterling DO  Attending Surgeon  Joint Replacement and Adult Reconstructive Surgery  [place], [place]

## 2023-10-23 ENCOUNTER — INFUSION (OUTPATIENT)
Dept: HEMATOLOGY/ONCOLOGY | Facility: CLINIC | Age: 78
End: 2023-10-23
Payer: MEDICARE

## 2023-10-23 ENCOUNTER — LAB (OUTPATIENT)
Dept: LAB | Facility: HOSPITAL | Age: 78
End: 2023-10-23
Payer: MEDICARE

## 2023-10-23 VITALS
RESPIRATION RATE: 16 BRPM | SYSTOLIC BLOOD PRESSURE: 153 MMHG | HEIGHT: 61 IN | OXYGEN SATURATION: 95 % | HEART RATE: 80 BPM | DIASTOLIC BLOOD PRESSURE: 79 MMHG | TEMPERATURE: 98.6 F | BODY MASS INDEX: 31.38 KG/M2

## 2023-10-23 DIAGNOSIS — D46.9 MYELODYSPLASTIC SYNDROME (MULTI): ICD-10-CM

## 2023-10-23 LAB
ACANTHOCYTES BLD QL SMEAR: ABNORMAL
BASOPHILS # BLD MANUAL: 0 X10*3/UL (ref 0–0.1)
BASOPHILS NFR BLD MANUAL: 0 %
DACRYOCYTES BLD QL SMEAR: ABNORMAL
EOSINOPHIL # BLD MANUAL: 0.21 X10*3/UL (ref 0–0.4)
EOSINOPHIL NFR BLD MANUAL: 3 %
ERYTHROCYTE [DISTWIDTH] IN BLOOD BY AUTOMATED COUNT: 21.2 % (ref 11.5–14.5)
FERRITIN SERPL-MCNC: 2592 NG/ML (ref 8–150)
HCT VFR BLD AUTO: 28.1 % (ref 36–46)
HGB BLD-MCNC: 8.9 G/DL (ref 12–16)
HYPOCHROMIA BLD QL SMEAR: ABNORMAL
IMM GRANULOCYTES # BLD AUTO: 0.01 X10*3/UL (ref 0–0.5)
IMM GRANULOCYTES NFR BLD AUTO: 0.1 % (ref 0–0.9)
IRON SATN MFR SERPL: 41 % (ref 25–45)
IRON SERPL-MCNC: 78 UG/DL (ref 35–150)
LYMPHOCYTES # BLD MANUAL: 1.86 X10*3/UL (ref 0.8–3)
LYMPHOCYTES NFR BLD MANUAL: 27 %
MCH RBC QN AUTO: 30.7 PG (ref 26–34)
MCHC RBC AUTO-ENTMCNC: 31.7 G/DL (ref 32–36)
MCV RBC AUTO: 97 FL (ref 80–100)
MONOCYTES # BLD MANUAL: 0.41 X10*3/UL (ref 0.05–0.8)
MONOCYTES NFR BLD MANUAL: 6 %
NEUTROPHILS # BLD MANUAL: 4.42 X10*3/UL (ref 1.6–5.5)
NEUTS BAND # BLD MANUAL: 0.62 X10*3/UL (ref 0–0.5)
NEUTS BAND NFR BLD MANUAL: 9 %
NEUTS SEG # BLD MANUAL: 3.8 X10*3/UL (ref 1.6–5)
NEUTS SEG NFR BLD MANUAL: 55 %
NRBC BLD-RTO: ABNORMAL /100{WBCS}
OVALOCYTES BLD QL SMEAR: ABNORMAL
PLATELET # BLD AUTO: 282 X10*3/UL (ref 150–450)
PMV BLD AUTO: 11.5 FL (ref 7.5–11.5)
RBC # BLD AUTO: 2.9 X10*6/UL (ref 4–5.2)
RBC MORPH BLD: ABNORMAL
TIBC SERPL-MCNC: 189 UG/DL (ref 240–445)
TOTAL CELLS COUNTED BLD: 100
UIBC SERPL-MCNC: 111 UG/DL (ref 110–370)
WBC # BLD AUTO: 6.9 X10*3/UL (ref 4.4–11.3)

## 2023-10-23 PROCEDURE — 36415 COLL VENOUS BLD VENIPUNCTURE: CPT

## 2023-10-23 PROCEDURE — 85027 COMPLETE CBC AUTOMATED: CPT

## 2023-10-23 PROCEDURE — 85007 BL SMEAR W/DIFF WBC COUNT: CPT

## 2023-10-23 PROCEDURE — 83550 IRON BINDING TEST: CPT

## 2023-10-23 PROCEDURE — 96372 THER/PROPH/DIAG INJ SC/IM: CPT | Performed by: NURSE PRACTITIONER

## 2023-10-23 PROCEDURE — 83540 ASSAY OF IRON: CPT

## 2023-10-23 PROCEDURE — 85027 COMPLETE CBC AUTOMATED: CPT | Performed by: NURSE PRACTITIONER

## 2023-10-23 PROCEDURE — 82668 ASSAY OF ERYTHROPOIETIN: CPT

## 2023-10-23 PROCEDURE — 85007 BL SMEAR W/DIFF WBC COUNT: CPT | Performed by: NURSE PRACTITIONER

## 2023-10-23 PROCEDURE — 6350000001 HC RX 635 EPOETIN >10,000 UNITS: Mod: JZ,JG | Performed by: NURSE PRACTITIONER

## 2023-10-23 PROCEDURE — 82728 ASSAY OF FERRITIN: CPT

## 2023-10-23 PROCEDURE — 96372 THER/PROPH/DIAG INJ SC/IM: CPT

## 2023-10-23 RX ORDER — ALBUTEROL SULFATE 0.83 MG/ML
3 SOLUTION RESPIRATORY (INHALATION) AS NEEDED
Status: CANCELLED | OUTPATIENT
Start: 2023-10-30

## 2023-10-23 RX ORDER — FAMOTIDINE 10 MG/ML
20 INJECTION INTRAVENOUS ONCE AS NEEDED
Status: CANCELLED | OUTPATIENT
Start: 2023-10-30

## 2023-10-23 RX ORDER — FAMOTIDINE 10 MG/ML
20 INJECTION INTRAVENOUS ONCE AS NEEDED
Status: DISCONTINUED | OUTPATIENT
Start: 2023-10-23 | End: 2023-10-23 | Stop reason: HOSPADM

## 2023-10-23 RX ORDER — ALBUTEROL SULFATE 0.83 MG/ML
3 SOLUTION RESPIRATORY (INHALATION) AS NEEDED
Status: DISCONTINUED | OUTPATIENT
Start: 2023-10-23 | End: 2023-10-23 | Stop reason: HOSPADM

## 2023-10-23 RX ORDER — EPINEPHRINE 0.3 MG/.3ML
0.3 INJECTION SUBCUTANEOUS EVERY 5 MIN PRN
Status: CANCELLED | OUTPATIENT
Start: 2023-10-30

## 2023-10-23 RX ORDER — EPINEPHRINE 0.3 MG/.3ML
0.3 INJECTION SUBCUTANEOUS EVERY 5 MIN PRN
Status: DISCONTINUED | OUTPATIENT
Start: 2023-10-23 | End: 2023-10-23 | Stop reason: HOSPADM

## 2023-10-23 RX ORDER — DIPHENHYDRAMINE HYDROCHLORIDE 50 MG/ML
50 INJECTION INTRAMUSCULAR; INTRAVENOUS AS NEEDED
Status: DISCONTINUED | OUTPATIENT
Start: 2023-10-23 | End: 2023-10-23 | Stop reason: HOSPADM

## 2023-10-23 RX ORDER — DIPHENHYDRAMINE HYDROCHLORIDE 50 MG/ML
50 INJECTION INTRAMUSCULAR; INTRAVENOUS AS NEEDED
Status: CANCELLED | OUTPATIENT
Start: 2023-10-30

## 2023-10-23 RX ADMIN — ERYTHROPOIETIN 80000 UNITS: 40000 INJECTION, SOLUTION INTRAVENOUS; SUBCUTANEOUS at 15:22

## 2023-10-23 ASSESSMENT — PAIN SCALES - GENERAL: PAINLEVEL: 0-NO PAIN

## 2023-10-23 NOTE — PROGRESS NOTES
Tana is here for her weekly 28767G of retacrit. Hgb today 8.9. Patient states she is feeling good and tolerating injections. No questions or concerns, Tana has her next injection date with same day labs.

## 2023-10-25 LAB — EPO SERPL-ACNC: 38 MU/ML (ref 4–27)

## 2023-10-30 ENCOUNTER — LAB (OUTPATIENT)
Dept: LAB | Facility: HOSPITAL | Age: 78
End: 2023-10-30
Payer: MEDICARE

## 2023-10-30 ENCOUNTER — INFUSION (OUTPATIENT)
Dept: HEMATOLOGY/ONCOLOGY | Facility: CLINIC | Age: 78
End: 2023-10-30
Payer: MEDICARE

## 2023-10-30 VITALS
TEMPERATURE: 98.4 F | RESPIRATION RATE: 16 BRPM | DIASTOLIC BLOOD PRESSURE: 64 MMHG | BODY MASS INDEX: 31.38 KG/M2 | HEART RATE: 81 BPM | OXYGEN SATURATION: 98 % | HEIGHT: 61 IN | SYSTOLIC BLOOD PRESSURE: 133 MMHG

## 2023-10-30 DIAGNOSIS — D46.9 MYELODYSPLASTIC SYNDROME (MULTI): ICD-10-CM

## 2023-10-30 LAB
BASOPHILS # BLD AUTO: 0.03 X10*3/UL (ref 0–0.1)
BASOPHILS NFR BLD AUTO: 0.4 %
DACRYOCYTES BLD QL SMEAR: NORMAL
EOSINOPHIL # BLD AUTO: 0.19 X10*3/UL (ref 0–0.4)
EOSINOPHIL NFR BLD AUTO: 2.8 %
ERYTHROCYTE [DISTWIDTH] IN BLOOD BY AUTOMATED COUNT: 21.5 % (ref 11.5–14.5)
GIANT PLATELETS BLD QL SMEAR: NORMAL
HCT VFR BLD AUTO: 25.1 % (ref 36–46)
HGB BLD-MCNC: 8 G/DL (ref 12–16)
HYPOCHROMIA BLD QL SMEAR: NORMAL
IMM GRANULOCYTES # BLD AUTO: 0.02 X10*3/UL (ref 0–0.5)
IMM GRANULOCYTES NFR BLD AUTO: 0.3 % (ref 0–0.9)
LYMPHOCYTES # BLD AUTO: 1.76 X10*3/UL (ref 0.8–3)
LYMPHOCYTES NFR BLD AUTO: 25.7 %
MCH RBC QN AUTO: 31.7 PG (ref 26–34)
MCHC RBC AUTO-ENTMCNC: 31.9 G/DL (ref 32–36)
MCV RBC AUTO: 100 FL (ref 80–100)
MONOCYTES # BLD AUTO: 0.56 X10*3/UL (ref 0.05–0.8)
MONOCYTES NFR BLD AUTO: 8.2 %
NEUTROPHILS # BLD AUTO: 4.28 X10*3/UL (ref 1.6–5.5)
NEUTROPHILS NFR BLD AUTO: 62.6 %
NRBC BLD-RTO: ABNORMAL /100{WBCS}
OVALOCYTES BLD QL SMEAR: NORMAL
PLATELET # BLD AUTO: 292 X10*3/UL (ref 150–450)
PMV BLD AUTO: 11.3 FL (ref 7.5–11.5)
POLYCHROMASIA BLD QL SMEAR: NORMAL
RBC # BLD AUTO: 2.52 X10*6/UL (ref 4–5.2)
RBC MORPH BLD: NORMAL
WBC # BLD AUTO: 7 X10*3/UL (ref 4.4–11.3)

## 2023-10-30 PROCEDURE — 36415 COLL VENOUS BLD VENIPUNCTURE: CPT

## 2023-10-30 PROCEDURE — 85025 COMPLETE CBC W/AUTO DIFF WBC: CPT

## 2023-10-30 PROCEDURE — 6350000001 HC RX 635 EPOETIN >10,000 UNITS: Mod: JZ,JG | Performed by: NURSE PRACTITIONER

## 2023-10-30 PROCEDURE — 96372 THER/PROPH/DIAG INJ SC/IM: CPT

## 2023-10-30 RX ORDER — ALBUTEROL SULFATE 0.83 MG/ML
3 SOLUTION RESPIRATORY (INHALATION) AS NEEDED
Status: CANCELLED | OUTPATIENT
Start: 2023-11-06

## 2023-10-30 RX ORDER — DIPHENHYDRAMINE HYDROCHLORIDE 50 MG/ML
50 INJECTION INTRAMUSCULAR; INTRAVENOUS AS NEEDED
Status: CANCELLED | OUTPATIENT
Start: 2023-11-06

## 2023-10-30 RX ORDER — FAMOTIDINE 10 MG/ML
20 INJECTION INTRAVENOUS ONCE AS NEEDED
Status: CANCELLED | OUTPATIENT
Start: 2023-11-06

## 2023-10-30 RX ORDER — EPINEPHRINE 0.3 MG/.3ML
0.3 INJECTION SUBCUTANEOUS EVERY 5 MIN PRN
Status: CANCELLED | OUTPATIENT
Start: 2023-11-06

## 2023-10-30 RX ADMIN — ERYTHROPOIETIN 80000 UNITS: 40000 INJECTION, SOLUTION INTRAVENOUS; SUBCUTANEOUS at 14:26

## 2023-10-30 NOTE — PROGRESS NOTES
Patient here for weekly Retacrit, tolerated well. Patient to return 11/6 for labs and injection. Patient denies any questions at this time. Patient discharged in stable condition.

## 2023-11-06 ENCOUNTER — TELEPHONE (OUTPATIENT)
Dept: PRIMARY CARE | Facility: CLINIC | Age: 78
End: 2023-11-06
Payer: MEDICARE

## 2023-11-06 ENCOUNTER — INFUSION (OUTPATIENT)
Dept: HEMATOLOGY/ONCOLOGY | Facility: CLINIC | Age: 78
End: 2023-11-06
Payer: MEDICARE

## 2023-11-06 ENCOUNTER — LAB (OUTPATIENT)
Dept: LAB | Facility: HOSPITAL | Age: 78
End: 2023-11-06
Payer: MEDICARE

## 2023-11-06 VITALS
HEART RATE: 85 BPM | TEMPERATURE: 98.1 F | DIASTOLIC BLOOD PRESSURE: 70 MMHG | SYSTOLIC BLOOD PRESSURE: 158 MMHG | BODY MASS INDEX: 31.38 KG/M2 | HEIGHT: 61 IN | RESPIRATION RATE: 16 BRPM

## 2023-11-06 DIAGNOSIS — D46.9 MYELODYSPLASTIC SYNDROME (MULTI): ICD-10-CM

## 2023-11-06 LAB
BASOPHILS # BLD AUTO: 0.04 X10*3/UL (ref 0–0.1)
BASOPHILS NFR BLD AUTO: 0.7 %
DACRYOCYTES BLD QL SMEAR: NORMAL
EOSINOPHIL # BLD AUTO: 0.17 X10*3/UL (ref 0–0.4)
EOSINOPHIL NFR BLD AUTO: 2.8 %
ERYTHROCYTE [DISTWIDTH] IN BLOOD BY AUTOMATED COUNT: 22.4 % (ref 11.5–14.5)
HCT VFR BLD AUTO: 24.8 % (ref 36–46)
HGB BLD-MCNC: 7.9 G/DL (ref 12–16)
HYPOCHROMIA BLD QL SMEAR: NORMAL
IMM GRANULOCYTES # BLD AUTO: 0.02 X10*3/UL (ref 0–0.5)
IMM GRANULOCYTES NFR BLD AUTO: 0.3 % (ref 0–0.9)
LYMPHOCYTES # BLD AUTO: 1.53 X10*3/UL (ref 0.8–3)
LYMPHOCYTES NFR BLD AUTO: 25.6 %
MCH RBC QN AUTO: 31.7 PG (ref 26–34)
MCHC RBC AUTO-ENTMCNC: 31.9 G/DL (ref 32–36)
MCV RBC AUTO: 100 FL (ref 80–100)
MONOCYTES # BLD AUTO: 0.44 X10*3/UL (ref 0.05–0.8)
MONOCYTES NFR BLD AUTO: 7.4 %
NEUTROPHILS # BLD AUTO: 3.78 X10*3/UL (ref 1.6–5.5)
NEUTROPHILS NFR BLD AUTO: 63.2 %
NRBC BLD-RTO: ABNORMAL /100{WBCS}
OVALOCYTES BLD QL SMEAR: NORMAL
PLATELET # BLD AUTO: 250 X10*3/UL (ref 150–450)
POLYCHROMASIA BLD QL SMEAR: NORMAL
RBC # BLD AUTO: 2.49 X10*6/UL (ref 4–5.2)
RBC MORPH BLD: NORMAL
TARGETS BLD QL SMEAR: NORMAL
WBC # BLD AUTO: 6 X10*3/UL (ref 4.4–11.3)

## 2023-11-06 PROCEDURE — 6350000001 HC RX 635 EPOETIN >10,000 UNITS: Mod: JZ,JG | Performed by: NURSE PRACTITIONER

## 2023-11-06 PROCEDURE — 96372 THER/PROPH/DIAG INJ SC/IM: CPT | Mod: JG | Performed by: NURSE PRACTITIONER

## 2023-11-06 PROCEDURE — 85025 COMPLETE CBC W/AUTO DIFF WBC: CPT

## 2023-11-06 PROCEDURE — 96372 THER/PROPH/DIAG INJ SC/IM: CPT

## 2023-11-06 PROCEDURE — 36415 COLL VENOUS BLD VENIPUNCTURE: CPT

## 2023-11-06 RX ORDER — FAMOTIDINE 10 MG/ML
20 INJECTION INTRAVENOUS ONCE AS NEEDED
Status: CANCELLED | OUTPATIENT
Start: 2023-11-13

## 2023-11-06 RX ORDER — DIPHENHYDRAMINE HYDROCHLORIDE 50 MG/ML
50 INJECTION INTRAMUSCULAR; INTRAVENOUS AS NEEDED
Status: CANCELLED | OUTPATIENT
Start: 2023-11-13

## 2023-11-06 RX ORDER — ALBUTEROL SULFATE 0.83 MG/ML
3 SOLUTION RESPIRATORY (INHALATION) AS NEEDED
Status: CANCELLED | OUTPATIENT
Start: 2023-11-13

## 2023-11-06 RX ORDER — EPINEPHRINE 0.3 MG/.3ML
0.3 INJECTION SUBCUTANEOUS EVERY 5 MIN PRN
Status: CANCELLED | OUTPATIENT
Start: 2023-11-13

## 2023-11-06 RX ADMIN — ERYTHROPOIETIN 80000 UNITS: 40000 INJECTION, SOLUTION INTRAVENOUS; SUBCUTANEOUS at 15:30

## 2023-11-06 ASSESSMENT — PAIN SCALES - GENERAL: PAINLEVEL: 0-NO PAIN

## 2023-11-06 NOTE — TELEPHONE ENCOUNTER
Patient presented in [facility]iology for a knee xray.  Patient stated she talked to  about knee pain and thought he ordered a knee xray for her.     Didn't see an order, does DA want an xray completed?    Nora from [facility] will contact patient to come back in if orders are placed [phone]

## 2023-11-09 PROBLEM — N18.9 CKD (CHRONIC KIDNEY DISEASE): Status: ACTIVE | Noted: 2023-08-22

## 2023-11-09 PROBLEM — I48.0 PAROXYSMAL ATRIAL FIBRILLATION (MULTI): Status: ACTIVE | Noted: 2023-08-22

## 2023-11-09 PROBLEM — E66.9 OBESITY (BMI 30-39.9): Status: ACTIVE | Noted: 2023-11-09

## 2023-11-13 ENCOUNTER — APPOINTMENT (OUTPATIENT)
Dept: LAB | Facility: HOSPITAL | Age: 78
End: 2023-11-13
Payer: MEDICARE

## 2023-11-13 ENCOUNTER — HOSPITAL ENCOUNTER (OUTPATIENT)
Dept: RADIOLOGY | Facility: HOSPITAL | Age: 78
Discharge: HOME | End: 2023-11-13
Payer: MEDICARE

## 2023-11-13 ENCOUNTER — INFUSION (OUTPATIENT)
Dept: HEMATOLOGY/ONCOLOGY | Facility: CLINIC | Age: 78
End: 2023-11-13
Payer: MEDICARE

## 2023-11-13 VITALS
DIASTOLIC BLOOD PRESSURE: 66 MMHG | RESPIRATION RATE: 16 BRPM | HEART RATE: 86 BPM | OXYGEN SATURATION: 97 % | SYSTOLIC BLOOD PRESSURE: 138 MMHG | BODY MASS INDEX: 31.38 KG/M2 | HEIGHT: 61 IN | TEMPERATURE: 97.2 F

## 2023-11-13 DIAGNOSIS — Z12.31 ENCOUNTER FOR SCREENING MAMMOGRAM FOR BREAST CANCER: ICD-10-CM

## 2023-11-13 DIAGNOSIS — D46.9 MYELODYSPLASTIC SYNDROME (MULTI): ICD-10-CM

## 2023-11-13 LAB
BASO STIPL BLD QL SMEAR: PRESENT
BASOPHILS # BLD AUTO: 0.05 X10*3/UL (ref 0–0.1)
BASOPHILS NFR BLD AUTO: 0.9 %
DACRYOCYTES BLD QL SMEAR: NORMAL
EOSINOPHIL # BLD AUTO: 0.2 X10*3/UL (ref 0–0.4)
EOSINOPHIL NFR BLD AUTO: 3.7 %
ERYTHROCYTE [DISTWIDTH] IN BLOOD BY AUTOMATED COUNT: 24.2 % (ref 11.5–14.5)
HCT VFR BLD AUTO: 23.1 % (ref 36–46)
HGB BLD-MCNC: 7.2 G/DL (ref 12–16)
HYPOCHROMIA BLD QL SMEAR: NORMAL
IMM GRANULOCYTES # BLD AUTO: 0 X10*3/UL (ref 0–0.5)
IMM GRANULOCYTES NFR BLD AUTO: 0 % (ref 0–0.9)
LYMPHOCYTES # BLD AUTO: 1.43 X10*3/UL (ref 0.8–3)
LYMPHOCYTES NFR BLD AUTO: 26.3 %
MCH RBC QN AUTO: 31.7 PG (ref 26–34)
MCHC RBC AUTO-ENTMCNC: 31.2 G/DL (ref 32–36)
MCV RBC AUTO: 102 FL (ref 80–100)
MONOCYTES # BLD AUTO: 0.48 X10*3/UL (ref 0.05–0.8)
MONOCYTES NFR BLD AUTO: 8.8 %
NEUTROPHILS # BLD AUTO: 3.28 X10*3/UL (ref 1.6–5.5)
NEUTROPHILS NFR BLD AUTO: 60.3 %
NRBC BLD-RTO: ABNORMAL /100{WBCS}
OVALOCYTES BLD QL SMEAR: NORMAL
PLATELET # BLD AUTO: 230 X10*3/UL (ref 150–450)
POLYCHROMASIA BLD QL SMEAR: NORMAL
RBC # BLD AUTO: 2.27 X10*6/UL (ref 4–5.2)
RBC MORPH BLD: NORMAL
WBC # BLD AUTO: 5.4 X10*3/UL (ref 4.4–11.3)

## 2023-11-13 PROCEDURE — 6350000001 HC RX 635 EPOETIN >10,000 UNITS: Mod: JZ,JG | Performed by: NURSE PRACTITIONER

## 2023-11-13 PROCEDURE — 77063 BREAST TOMOSYNTHESIS BI: CPT

## 2023-11-13 PROCEDURE — 77067 SCR MAMMO BI INCL CAD: CPT | Performed by: RADIOLOGY

## 2023-11-13 PROCEDURE — 96372 THER/PROPH/DIAG INJ SC/IM: CPT

## 2023-11-13 PROCEDURE — 36415 COLL VENOUS BLD VENIPUNCTURE: CPT

## 2023-11-13 PROCEDURE — 85025 COMPLETE CBC W/AUTO DIFF WBC: CPT

## 2023-11-13 PROCEDURE — 77063 BREAST TOMOSYNTHESIS BI: CPT | Performed by: RADIOLOGY

## 2023-11-13 RX ORDER — FAMOTIDINE 10 MG/ML
20 INJECTION INTRAVENOUS ONCE AS NEEDED
Status: CANCELLED | OUTPATIENT
Start: 2023-11-20

## 2023-11-13 RX ORDER — DIPHENHYDRAMINE HYDROCHLORIDE 50 MG/ML
50 INJECTION INTRAMUSCULAR; INTRAVENOUS AS NEEDED
Status: CANCELLED | OUTPATIENT
Start: 2023-11-20

## 2023-11-13 RX ORDER — EPINEPHRINE 0.3 MG/.3ML
0.3 INJECTION SUBCUTANEOUS EVERY 5 MIN PRN
Status: CANCELLED | OUTPATIENT
Start: 2023-11-20

## 2023-11-13 RX ORDER — ALBUTEROL SULFATE 0.83 MG/ML
3 SOLUTION RESPIRATORY (INHALATION) AS NEEDED
Status: CANCELLED | OUTPATIENT
Start: 2023-11-20

## 2023-11-13 RX ADMIN — ERYTHROPOIETIN 80000 UNITS: 40000 INJECTION, SOLUTION INTRAVENOUS; SUBCUTANEOUS at 14:47

## 2023-11-13 NOTE — PROGRESS NOTES
Patient here for Retacrit injection tolerated well. Patient to return 11/20 for labs and next injection. Patient denies any questions at this time. Patient discharged in stable condition.

## 2023-11-14 ENCOUNTER — EVALUATION (OUTPATIENT)
Dept: PHYSICAL THERAPY | Facility: HOSPITAL | Age: 78
End: 2023-11-14
Payer: MEDICARE

## 2023-11-14 DIAGNOSIS — G89.29 CHRONIC PAIN OF RIGHT KNEE: ICD-10-CM

## 2023-11-14 DIAGNOSIS — M25.561 CHRONIC PAIN OF RIGHT KNEE: ICD-10-CM

## 2023-11-14 DIAGNOSIS — G89.29 CHRONIC PAIN OF MULTIPLE JOINTS: Primary | ICD-10-CM

## 2023-11-14 DIAGNOSIS — M25.50 CHRONIC PAIN OF MULTIPLE JOINTS: Primary | ICD-10-CM

## 2023-11-14 DIAGNOSIS — R53.1 WEAKNESS GENERALIZED: ICD-10-CM

## 2023-11-14 PROCEDURE — 97163 PT EVAL HIGH COMPLEX 45 MIN: CPT | Mod: GP

## 2023-11-14 ASSESSMENT — PATIENT HEALTH QUESTIONNAIRE - PHQ9
10. IF YOU CHECKED OFF ANY PROBLEMS, HOW DIFFICULT HAVE THESE PROBLEMS MADE IT FOR YOU TO DO YOUR WORK, TAKE CARE OF THINGS AT HOME, OR GET ALONG WITH OTHER PEOPLE: NOT DIFFICULT AT ALL
SUM OF ALL RESPONSES TO PHQ QUESTIONS 1-9: 13
2. FEELING DOWN, DEPRESSED OR HOPELESS: SEVERAL DAYS
7. TROUBLE CONCENTRATING ON THINGS, SUCH AS READING THE NEWSPAPER OR WATCHING TELEVISION: NOT AT ALL
1. LITTLE INTEREST OR PLEASURE IN DOING THINGS: NEARLY EVERY DAY
8. MOVING OR SPEAKING SO SLOWLY THAT OTHER PEOPLE COULD HAVE NOTICED. OR THE OPPOSITE, BEING SO FIGETY OR RESTLESS THAT YOU HAVE BEEN MOVING AROUND A LOT MORE THAN USUAL: NOT AT ALL
SUM OF ALL RESPONSES TO PHQ9 QUESTIONS 1 AND 2: 4
4. FEELING TIRED OR HAVING LITTLE ENERGY: NEARLY EVERY DAY
9. THOUGHTS THAT YOU WOULD BE BETTER OFF DEAD, OR OF HURTING YOURSELF: NOT AT ALL
5. POOR APPETITE OR OVEREATING: NEARLY EVERY DAY
6. FEELING BAD ABOUT YOURSELF - OR THAT YOU ARE A FAILURE OR HAVE LET YOURSELF OR YOUR FAMILY DOWN: NEARLY EVERY DAY
3. TROUBLE FALLING OR STAYING ASLEEP OR SLEEPING TOO MUCH: NOT AT ALL

## 2023-11-14 ASSESSMENT — ENCOUNTER SYMPTOMS
LOSS OF SENSATION IN FEET: 1
DEPRESSION: 1
OCCASIONAL FEELINGS OF UNSTEADINESS: 1

## 2023-11-14 ASSESSMENT — PAIN - FUNCTIONAL ASSESSMENT: PAIN_FUNCTIONAL_ASSESSMENT: 0-10

## 2023-11-14 NOTE — LETTER
November 24, 2023     Patient: Tana Hargrove   YOB: 1945   Date of Visit: 11/14/2023       To Whom It May Concern:    It is my medical opinion that Tana Hargrove {Work release (duty restriction):91038}.    If you have any questions or concerns, please don't hesitate to call.         Sincerely,        Yenny George, PT    CC: No Recipients

## 2023-11-14 NOTE — LETTER
November 24, 2023     Patient: Tana Hargrove   YOB: 1945   Date of Visit: 11/14/2023       To Whom it May Concern:    Tana Hargrove was seen in my clinic on 11/14/2023. She {Return to school/sport:11704}.    If you have any questions or concerns, please don't hesitate to call.         Sincerely,          Yenny George, PT        CC: No Recipients

## 2023-11-14 NOTE — PROGRESS NOTES
Physical Therapy    Physical Therapy Evaluation and Treatment      Patient Name: Tana Hargrove  MRN: [id number]  : 1945   Today's Date: 2023        Current Problem:   1. Chronic pain of multiple joints  Follow Up In Physical Therapy      2. Chronic pain of right knee  Referral to Physical Therapy    Follow Up In Physical Therapy      3. Weakness generalized  Follow Up In Physical Therapy          Subjective    General:  General  Reason for Referral: knee pain  Referred By: Dr Sterling  Past Medical History Relevant to Rehab: h/o lumbar fusion w/ hardware removed, L shoulder decompression, Myelodysplatic syndrome, CHF, DM, Afib,CKD, COPD, Gout, TIA, Osteoporosis, DIGNA TKA  Preferred Learning Style: visual  Pt is a 77 yo female that reports having right knee that began hurting about a year ago, gradual onset after falling and fracturing right hip with ORIF.  Pt with history of DIGNA TKA  Precautions:  Precautions  STEADI Fall Risk Score (The score of 4 or more indicates an increased risk of falling): 9       Pain:  Pain Assessment  Pain Assessment: 0-10  Pain Type: Chronic pain  Pain Location: Knee  Pain Orientation: Right  Knee  Current : 0/10  Worst:8/10  Back/ Neck:    10/10     Home Living:     Ranch condo, walk in shower, no grab bars, starting to have trouble dressing , takes forever to get ready.      Prior Level of Function:     Modified independence with ADL's and ambulation    Objective   Sensation:    Intact and symmetric to light touch in LE's with c/o tingling in toes of DIGNA feet    Strength: Measured with seated MMT'ing     Right Left    Hip:      Flexion 3/5 4/5 Groin pain w/ Left   Abduction 4+/5 4+/5    Adduction 5/5 5/5          Knee:      Flexion 4+/5 5/5    Extension 4-/5 5/5  Pain in right         Ankle:      PF WNL WNL    DF WNL         WNL       ROM:  DIGNA active knee flexion =110 degrees, Ext = WNL     Palpation:  TTP over right medial and lateral femoral condyles  Special Testing:   A/P  ligaments intact, some laxity with firm end feel when performing right varus stress. Xray show possible LCL avulsion fx    Gait:   Pt ambulates with modified independence using rollator  Outcome Measures:      LEFS:  Treatments:                                            Date: 11/14                                              VISIT# #1 # # #    EXERCISES REPS REPS REPS REPS            NuStep                RB Calf Str        HS Str                Hip abduction        Hip Flexion                                                                                                                                                                                        HEP           OP EDUCATION:  Outpatient Education  Individual(s) Educated: Patient  Education Provided: Physiology, Home Exercise Program, POC, Fall Risk  Risk and Benefits Discussed with Patient/Caregiver/Other: yes  Patient/Caregiver Demonstrated Understanding: yes  Plan of Care Discussed and Agreed Upon: yes  Patient Response to Education: Patient/Caregiver Verbalized Understanding of Information, Patient/Caregiver Asked Appropriate Questions, Patient/Caregiver Performed Return Demonstration of Exercises/Activities  Assessment:  PT Assessment  PT Assessment Results: Decreased strength, Decreased range of motion, Pain, Decreased mobility  Rehab Prognosis: Good  Plan:  OP PT Plan  Treatment/Interventions: Aquatic therapy, Cryotherapy, Education/ Instruction, Gait training, Manual therapy, Neuromuscular re-education, Therapeutic exercises, Therapeutic activities  PT Plan: Skilled PT  PT Frequency: 2 times per week  Duration: 4-6  Onset Date: 11/01/22  Certification Period Start Date: 11/14/23  Certification Period End Date: 02/14/23  Rehab Potential: Good  Plan of Care Agreement: Patient     Goals:  Active       Right Knee pain       PT Goal 1       Start:  11/14/23    Expected End:  12/08/23       Pt will demonstrate independence with HEP to reinforce gains  made in treatment          PT Goal 2       Start:  11/14/23    Expected End:  12/08/23       Pt will report having a decrease in right knee pain to 6/10 at its worst for increased tolerance for closed chain activities          PT Goal 3       Start:  11/14/23    Expected End:  12/22/23       Pt will have an increase in right LE strength by 1/3 MMT grade to improve balance and mobility          PT Goal 4       Start:  11/14/23    Expected End:  12/08/23       Perform Gore Balance assessment

## 2023-11-16 ENCOUNTER — TELEPHONE (OUTPATIENT)
Dept: PRIMARY CARE | Facility: CLINIC | Age: 78
End: 2023-11-16
Payer: MEDICARE

## 2023-11-16 DIAGNOSIS — M79.18 MYOFASCIAL PAIN SYNDROME: ICD-10-CM

## 2023-11-16 NOTE — TELEPHONE ENCOUNTER
Patient is requesting a refill of    pregabalin (Lyrica) 100 mg capsule    Pharmacy is [facility] in [place]

## 2023-11-17 DIAGNOSIS — M79.18 MYOFASCIAL PAIN SYNDROME: ICD-10-CM

## 2023-11-17 RX ORDER — PREGABALIN 100 MG/1
CAPSULE ORAL
Qty: 60 CAPSULE | Refills: 0 | Status: CANCELLED | OUTPATIENT
Start: 2023-11-17

## 2023-11-20 ENCOUNTER — LAB (OUTPATIENT)
Dept: LAB | Facility: HOSPITAL | Age: 78
End: 2023-11-20
Payer: COMMERCIAL

## 2023-11-20 ENCOUNTER — INFUSION (OUTPATIENT)
Dept: HEMATOLOGY/ONCOLOGY | Facility: CLINIC | Age: 78
End: 2023-11-20
Payer: MEDICARE

## 2023-11-20 VITALS
SYSTOLIC BLOOD PRESSURE: 141 MMHG | RESPIRATION RATE: 16 BRPM | HEART RATE: 71 BPM | OXYGEN SATURATION: 91 % | HEIGHT: 61 IN | DIASTOLIC BLOOD PRESSURE: 66 MMHG | TEMPERATURE: 97.5 F | BODY MASS INDEX: 31.38 KG/M2

## 2023-11-20 DIAGNOSIS — D46.9 MYELODYSPLASTIC SYNDROME (MULTI): ICD-10-CM

## 2023-11-20 LAB
BASOPHILS # BLD AUTO: 0.06 X10*3/UL (ref 0–0.1)
BASOPHILS NFR BLD AUTO: 1.2 %
DACRYOCYTES BLD QL SMEAR: NORMAL
EOSINOPHIL # BLD AUTO: 0.09 X10*3/UL (ref 0–0.4)
EOSINOPHIL NFR BLD AUTO: 1.8 %
ERYTHROCYTE [DISTWIDTH] IN BLOOD BY AUTOMATED COUNT: 24.6 % (ref 11.5–14.5)
HCT VFR BLD AUTO: 24.9 % (ref 36–46)
HGB BLD-MCNC: 7.8 G/DL (ref 12–16)
IMM GRANULOCYTES # BLD AUTO: 0.01 X10*3/UL (ref 0–0.5)
IMM GRANULOCYTES NFR BLD AUTO: 0.2 % (ref 0–0.9)
LYMPHOCYTES # BLD AUTO: 1.19 X10*3/UL (ref 0.8–3)
LYMPHOCYTES NFR BLD AUTO: 23.2 %
MCH RBC QN AUTO: 31.5 PG (ref 26–34)
MCHC RBC AUTO-ENTMCNC: 31.3 G/DL (ref 32–36)
MCV RBC AUTO: 100 FL (ref 80–100)
MONOCYTES # BLD AUTO: 0.44 X10*3/UL (ref 0.05–0.8)
MONOCYTES NFR BLD AUTO: 8.6 %
NEUTROPHILS # BLD AUTO: 3.34 X10*3/UL (ref 1.6–5.5)
NEUTROPHILS NFR BLD AUTO: 65 %
NRBC BLD-RTO: ABNORMAL /100{WBCS}
OVALOCYTES BLD QL SMEAR: NORMAL
PLATELET # BLD AUTO: 228 X10*3/UL (ref 150–450)
POLYCHROMASIA BLD QL SMEAR: NORMAL
RBC # BLD AUTO: 2.48 X10*6/UL (ref 4–5.2)
RBC MORPH BLD: NORMAL
WBC # BLD AUTO: 5.1 X10*3/UL (ref 4.4–11.3)

## 2023-11-20 PROCEDURE — 96372 THER/PROPH/DIAG INJ SC/IM: CPT

## 2023-11-20 PROCEDURE — 85025 COMPLETE CBC W/AUTO DIFF WBC: CPT

## 2023-11-20 PROCEDURE — 36415 COLL VENOUS BLD VENIPUNCTURE: CPT

## 2023-11-20 PROCEDURE — 6350000001 HC RX 635 EPOETIN >10,000 UNITS: Mod: JZ,JG,EC | Performed by: NURSE PRACTITIONER

## 2023-11-20 RX ORDER — EPINEPHRINE 0.3 MG/.3ML
0.3 INJECTION SUBCUTANEOUS EVERY 5 MIN PRN
Status: DISCONTINUED | OUTPATIENT
Start: 2023-11-20 | End: 2023-11-20 | Stop reason: HOSPADM

## 2023-11-20 RX ORDER — ALBUTEROL SULFATE 0.83 MG/ML
3 SOLUTION RESPIRATORY (INHALATION) AS NEEDED
Status: CANCELLED | OUTPATIENT
Start: 2023-11-27

## 2023-11-20 RX ORDER — FAMOTIDINE 10 MG/ML
20 INJECTION INTRAVENOUS ONCE AS NEEDED
Status: CANCELLED | OUTPATIENT
Start: 2023-11-27

## 2023-11-20 RX ORDER — DIPHENHYDRAMINE HYDROCHLORIDE 50 MG/ML
50 INJECTION INTRAMUSCULAR; INTRAVENOUS AS NEEDED
Status: CANCELLED | OUTPATIENT
Start: 2023-11-27

## 2023-11-20 RX ORDER — FAMOTIDINE 10 MG/ML
20 INJECTION INTRAVENOUS ONCE AS NEEDED
Status: DISCONTINUED | OUTPATIENT
Start: 2023-11-20 | End: 2023-11-20 | Stop reason: HOSPADM

## 2023-11-20 RX ORDER — EPINEPHRINE 0.3 MG/.3ML
0.3 INJECTION SUBCUTANEOUS EVERY 5 MIN PRN
Status: CANCELLED | OUTPATIENT
Start: 2023-11-27

## 2023-11-20 RX ORDER — ALBUTEROL SULFATE 0.83 MG/ML
3 SOLUTION RESPIRATORY (INHALATION) AS NEEDED
Status: DISCONTINUED | OUTPATIENT
Start: 2023-11-20 | End: 2023-11-20 | Stop reason: HOSPADM

## 2023-11-20 RX ORDER — DIPHENHYDRAMINE HYDROCHLORIDE 50 MG/ML
50 INJECTION INTRAMUSCULAR; INTRAVENOUS AS NEEDED
Status: DISCONTINUED | OUTPATIENT
Start: 2023-11-20 | End: 2023-11-20 | Stop reason: HOSPADM

## 2023-11-20 RX ADMIN — ERYTHROPOIETIN 80000 UNITS: 40000 INJECTION, SOLUTION INTRAVENOUS; SUBCUTANEOUS at 14:15

## 2023-11-20 ASSESSMENT — PAIN SCALES - GENERAL: PAINLEVEL: 0-NO PAIN

## 2023-11-20 NOTE — PATIENT INSTRUCTIONS

## 2023-11-20 NOTE — PROGRESS NOTES
Patient here for weekly retacrit. Patient to return in 1 week for labs and treatment. Patient denies any questions at this time. Patient discharged in stable condition.

## 2023-11-20 NOTE — TELEPHONE ENCOUNTER
It looks like you denied this recently. Does the patient need an appointment or was this for another reason?   Patient understands condition, prognosis and need for follow up care.

## 2023-11-21 RX ORDER — PREGABALIN 100 MG/1
CAPSULE ORAL
Qty: 60 CAPSULE | Refills: 0 | OUTPATIENT
Start: 2023-11-21

## 2023-11-21 RX ORDER — PREGABALIN 100 MG/1
CAPSULE ORAL
Qty: 60 CAPSULE | Refills: 0 | Status: SHIPPED | OUTPATIENT
Start: 2023-11-21 | End: 2024-01-15 | Stop reason: SDUPTHER

## 2023-11-27 ENCOUNTER — LAB (OUTPATIENT)
Dept: LAB | Facility: HOSPITAL | Age: 78
End: 2023-11-27
Payer: MEDICARE

## 2023-11-27 ENCOUNTER — APPOINTMENT (OUTPATIENT)
Dept: HEMATOLOGY/ONCOLOGY | Facility: CLINIC | Age: 78
End: 2023-11-27
Payer: MEDICARE

## 2023-11-27 ENCOUNTER — INFUSION (OUTPATIENT)
Dept: HEMATOLOGY/ONCOLOGY | Facility: CLINIC | Age: 78
End: 2023-11-27
Payer: MEDICARE

## 2023-11-27 VITALS
SYSTOLIC BLOOD PRESSURE: 147 MMHG | TEMPERATURE: 97.5 F | HEIGHT: 61 IN | RESPIRATION RATE: 16 BRPM | HEART RATE: 67 BPM | DIASTOLIC BLOOD PRESSURE: 65 MMHG | BODY MASS INDEX: 31.38 KG/M2

## 2023-11-27 DIAGNOSIS — D46.9 MYELODYSPLASTIC SYNDROME (MULTI): ICD-10-CM

## 2023-11-27 LAB
BASO STIPL BLD QL SMEAR: PRESENT
BASOPHILS # BLD AUTO: 0.06 X10*3/UL (ref 0–0.1)
BASOPHILS NFR BLD AUTO: 1.1 %
DACRYOCYTES BLD QL SMEAR: NORMAL
EOSINOPHIL # BLD AUTO: 0.23 X10*3/UL (ref 0–0.4)
EOSINOPHIL NFR BLD AUTO: 4.1 %
ERYTHROCYTE [DISTWIDTH] IN BLOOD BY AUTOMATED COUNT: 24.7 % (ref 11.5–14.5)
HCT VFR BLD AUTO: 24.6 % (ref 36–46)
HGB BLD-MCNC: 7.7 G/DL (ref 12–16)
HYPOCHROMIA BLD QL SMEAR: NORMAL
IMM GRANULOCYTES # BLD AUTO: 0.01 X10*3/UL (ref 0–0.5)
IMM GRANULOCYTES NFR BLD AUTO: 0.2 % (ref 0–0.9)
LYMPHOCYTES # BLD AUTO: 1.69 X10*3/UL (ref 0.8–3)
LYMPHOCYTES NFR BLD AUTO: 30.2 %
MCH RBC QN AUTO: 31.7 PG (ref 26–34)
MCHC RBC AUTO-ENTMCNC: 31.3 G/DL (ref 32–36)
MCV RBC AUTO: 101 FL (ref 80–100)
MONOCYTES # BLD AUTO: 0.48 X10*3/UL (ref 0.05–0.8)
MONOCYTES NFR BLD AUTO: 8.6 %
NEUTROPHILS # BLD AUTO: 3.13 X10*3/UL (ref 1.6–5.5)
NEUTROPHILS NFR BLD AUTO: 55.8 %
NRBC BLD-RTO: ABNORMAL /100{WBCS}
PLATELET # BLD AUTO: 356 X10*3/UL (ref 150–450)
POLYCHROMASIA BLD QL SMEAR: NORMAL
RBC # BLD AUTO: 2.43 X10*6/UL (ref 4–5.2)
RBC MORPH BLD: NORMAL
WBC # BLD AUTO: 5.6 X10*3/UL (ref 4.4–11.3)

## 2023-11-27 PROCEDURE — 96372 THER/PROPH/DIAG INJ SC/IM: CPT

## 2023-11-27 PROCEDURE — 6350000001 HC RX 635 EPOETIN >10,000 UNITS: Mod: JZ,JG | Performed by: NURSE PRACTITIONER

## 2023-11-27 PROCEDURE — 85025 COMPLETE CBC W/AUTO DIFF WBC: CPT

## 2023-11-27 PROCEDURE — 36415 COLL VENOUS BLD VENIPUNCTURE: CPT

## 2023-11-27 RX ORDER — DIPHENHYDRAMINE HYDROCHLORIDE 50 MG/ML
50 INJECTION INTRAMUSCULAR; INTRAVENOUS AS NEEDED
Status: CANCELLED | OUTPATIENT
Start: 2023-12-04

## 2023-11-27 RX ORDER — FAMOTIDINE 10 MG/ML
20 INJECTION INTRAVENOUS ONCE AS NEEDED
Status: CANCELLED | OUTPATIENT
Start: 2023-12-04

## 2023-11-27 RX ORDER — EPINEPHRINE 0.3 MG/.3ML
0.3 INJECTION SUBCUTANEOUS EVERY 5 MIN PRN
Status: CANCELLED | OUTPATIENT
Start: 2023-12-04

## 2023-11-27 RX ORDER — ALBUTEROL SULFATE 0.83 MG/ML
3 SOLUTION RESPIRATORY (INHALATION) AS NEEDED
Status: CANCELLED | OUTPATIENT
Start: 2023-12-04

## 2023-11-27 RX ADMIN — ERYTHROPOIETIN 80000 UNITS: 40000 INJECTION, SOLUTION INTRAVENOUS; SUBCUTANEOUS at 12:47

## 2023-11-27 ASSESSMENT — PAIN SCALES - GENERAL: PAINLEVEL: 0-NO PAIN

## 2023-11-27 NOTE — PROGRESS NOTES
Patient here for weekly Retacrit injection tolerated it well. Patient to return 12/4 for labs and next injection. Patient denies any questions at this time. Patient discharged in stable condition.

## 2023-12-04 ENCOUNTER — DOCUMENTATION (OUTPATIENT)
Dept: PHYSICAL THERAPY | Facility: HOSPITAL | Age: 78
End: 2023-12-04
Payer: MEDICARE

## 2023-12-04 ENCOUNTER — APPOINTMENT (OUTPATIENT)
Dept: HEMATOLOGY/ONCOLOGY | Facility: CLINIC | Age: 78
End: 2023-12-04
Payer: MEDICARE

## 2023-12-04 NOTE — PROGRESS NOTES
Physical Therapy                 Therapy Communication Note    Patient Name: Tana Hargrove  MRN: [id number]  Today's Date: 12/4/2023     Discipline: Physical Therapy    Missed Visit Reason:      Missed Time: No Show    Comment: pt's 2nd N/S spoke with pt on phone, discussed N/S policy

## 2023-12-05 ENCOUNTER — INFUSION (OUTPATIENT)
Dept: HEMATOLOGY/ONCOLOGY | Facility: CLINIC | Age: 78
End: 2023-12-05
Payer: MEDICARE

## 2023-12-05 ENCOUNTER — LAB (OUTPATIENT)
Dept: LAB | Facility: HOSPITAL | Age: 78
End: 2023-12-05
Payer: MEDICARE

## 2023-12-05 VITALS
BODY MASS INDEX: 31.38 KG/M2 | DIASTOLIC BLOOD PRESSURE: 71 MMHG | HEIGHT: 61 IN | RESPIRATION RATE: 16 BRPM | OXYGEN SATURATION: 100 % | TEMPERATURE: 97.3 F | HEART RATE: 75 BPM | SYSTOLIC BLOOD PRESSURE: 157 MMHG

## 2023-12-05 DIAGNOSIS — D46.9 MYELODYSPLASTIC SYNDROME (MULTI): ICD-10-CM

## 2023-12-05 LAB
BASOPHILS # BLD AUTO: 0.07 X10*3/UL (ref 0–0.1)
BASOPHILS NFR BLD AUTO: 1.4 %
DACRYOCYTES BLD QL SMEAR: NORMAL
EOSINOPHIL # BLD AUTO: 0.19 X10*3/UL (ref 0–0.4)
EOSINOPHIL NFR BLD AUTO: 3.9 %
ERYTHROCYTE [DISTWIDTH] IN BLOOD BY AUTOMATED COUNT: 24.7 % (ref 11.5–14.5)
HCT VFR BLD AUTO: 23.1 % (ref 36–46)
HGB BLD-MCNC: 7.2 G/DL (ref 12–16)
HYPOCHROMIA BLD QL SMEAR: NORMAL
IMM GRANULOCYTES # BLD AUTO: 0.01 X10*3/UL (ref 0–0.5)
IMM GRANULOCYTES NFR BLD AUTO: 0.2 % (ref 0–0.9)
LYMPHOCYTES # BLD AUTO: 1.55 X10*3/UL (ref 0.8–3)
LYMPHOCYTES NFR BLD AUTO: 32 %
MCH RBC QN AUTO: 32.4 PG (ref 26–34)
MCHC RBC AUTO-ENTMCNC: 31.2 G/DL (ref 32–36)
MCV RBC AUTO: 104 FL (ref 80–100)
MONOCYTES # BLD AUTO: 0.33 X10*3/UL (ref 0.05–0.8)
MONOCYTES NFR BLD AUTO: 6.8 %
NEUTROPHILS # BLD AUTO: 2.69 X10*3/UL (ref 1.6–5.5)
NEUTROPHILS NFR BLD AUTO: 55.7 %
NRBC BLD-RTO: ABNORMAL /100{WBCS}
OVALOCYTES BLD QL SMEAR: NORMAL
PLATELET # BLD AUTO: 275 X10*3/UL (ref 150–450)
POLYCHROMASIA BLD QL SMEAR: NORMAL
RBC # BLD AUTO: 2.22 X10*6/UL (ref 4–5.2)
RBC MORPH BLD: NORMAL
TARGETS BLD QL SMEAR: NORMAL
WBC # BLD AUTO: 4.8 X10*3/UL (ref 4.4–11.3)

## 2023-12-05 PROCEDURE — 85025 COMPLETE CBC W/AUTO DIFF WBC: CPT

## 2023-12-05 PROCEDURE — 96372 THER/PROPH/DIAG INJ SC/IM: CPT

## 2023-12-05 PROCEDURE — 2500000004 HC RX 250 GENERAL PHARMACY W/ HCPCS (ALT 636 FOR OP/ED): Mod: JZ,JG | Performed by: NURSE PRACTITIONER

## 2023-12-05 PROCEDURE — 36415 COLL VENOUS BLD VENIPUNCTURE: CPT

## 2023-12-05 RX ORDER — FAMOTIDINE 10 MG/ML
20 INJECTION INTRAVENOUS ONCE AS NEEDED
Status: CANCELLED | OUTPATIENT
Start: 2023-12-11

## 2023-12-05 RX ORDER — ALBUTEROL SULFATE 0.83 MG/ML
3 SOLUTION RESPIRATORY (INHALATION) AS NEEDED
Status: CANCELLED | OUTPATIENT
Start: 2023-12-11

## 2023-12-05 RX ORDER — DIPHENHYDRAMINE HYDROCHLORIDE 50 MG/ML
50 INJECTION INTRAMUSCULAR; INTRAVENOUS AS NEEDED
Status: CANCELLED | OUTPATIENT
Start: 2023-12-11

## 2023-12-05 RX ORDER — EPINEPHRINE 0.3 MG/.3ML
0.3 INJECTION SUBCUTANEOUS EVERY 5 MIN PRN
Status: CANCELLED | OUTPATIENT
Start: 2023-12-11

## 2023-12-05 RX ADMIN — ERYTHROPOIETIN 80000 UNITS: 40000 INJECTION, SOLUTION INTRAVENOUS; SUBCUTANEOUS at 12:24

## 2023-12-05 ASSESSMENT — PAIN SCALES - GENERAL: PAINLEVEL: 0-NO PAIN

## 2023-12-05 NOTE — PROGRESS NOTES
.Patient tolerated retacrit shot well, no reaction noted. Patient feels well and has no complaints, vital signs stable. Patient discharged in stable condition with no further needs. Patient returns on 12/12/2023. Patient verbalized understanding of all.

## 2023-12-06 ENCOUNTER — TREATMENT (OUTPATIENT)
Dept: PHYSICAL THERAPY | Facility: HOSPITAL | Age: 78
End: 2023-12-06
Payer: MEDICARE

## 2023-12-06 DIAGNOSIS — G89.29 CHRONIC PAIN OF RIGHT KNEE: ICD-10-CM

## 2023-12-06 DIAGNOSIS — M25.561 CHRONIC PAIN OF RIGHT KNEE: ICD-10-CM

## 2023-12-06 DIAGNOSIS — R53.1 WEAKNESS GENERALIZED: ICD-10-CM

## 2023-12-06 DIAGNOSIS — M25.50 CHRONIC PAIN OF MULTIPLE JOINTS: ICD-10-CM

## 2023-12-06 DIAGNOSIS — G89.29 CHRONIC PAIN OF MULTIPLE JOINTS: ICD-10-CM

## 2023-12-06 PROCEDURE — 97110 THERAPEUTIC EXERCISES: CPT | Mod: GP

## 2023-12-06 ASSESSMENT — PAIN SCALES - GENERAL
PAINLEVEL_OUTOF10: 8
PAINLEVEL_OUTOF10: 8

## 2023-12-06 ASSESSMENT — PAIN - FUNCTIONAL ASSESSMENT: PAIN_FUNCTIONAL_ASSESSMENT: 0-10

## 2023-12-06 NOTE — PROGRESS NOTES
"Physical Therapy    Physical Therapy Treatment    Patient Name: Tana Hargrove  MRN: [id number]  : 1945   Today's Date: 2023  Time Calculation  Start Time: 1015  Stop Time: 1055  Time Calculation (min): 40 min  Visit #2            Current Problem  1. Chronic pain of right knee  Follow Up In Physical Therapy      2. Weakness generalized  Follow Up In Physical Therapy      3. Chronic pain of multiple joints  Follow Up In Physical Therapy            Subjective   Pt reports that she feels like a truck ran her over, mornings are bad for her.  Pt denies falling since last visit       Precautions  Precautions  STEADI Fall Risk Score (The score of 4 or more indicates an increased risk of falling): 9       Pain  Pain Assessment: 0-10  Pain Score: 8  Pain Type: Chronic pain  Pain Location: Knee  Pain Orientation: Right  Multiple Pain Sites: Two    Objective        Initiation of land based therapy for strengthening and mobility  Treatments:                                         Date:  HD  HD                                             VISIT# #1 #2 # #    EXERCISES REPS REPS REPS REPS            NuStep   L1 x 10' W/ MH              RB Calf Str  3 x 30\"      HS Str  3 x 30\"      Standing          Hip abduction          Hip Flexion  2x10              Ball Squeeze  2 x 10 x 5\" H      Hip Abd seated  2 x 10 x gtb                                                                                                                                                              HEP     X        Assessment:  Pt tolerated treatment without complaint of increase in symptoms, responded to treatment with report of decrease in pain after treatment, beginning to progress with POC         Plan:     Progress with pain management and strengthening    Goals:  Active       Right Knee pain       PT Goal 1       Start:  23    Expected End:  23       Pt will demonstrate independence with HEP to reinforce gains made in " treatment          PT Goal 2       Start:  11/14/23    Expected End:  12/08/23       Pt will report having a decrease in right knee pain to 6/10 at its worst for increased tolerance for closed chain activities          PT Goal 3       Start:  11/14/23    Expected End:  12/22/23       Pt will have an increase in right LE strength by 1/3 MMT grade to improve balance and mobility          PT Goal 4       Start:  11/14/23    Expected End:  12/08/23       Perform Gore Balance assessment              .

## 2023-12-11 ENCOUNTER — APPOINTMENT (OUTPATIENT)
Dept: HEMATOLOGY/ONCOLOGY | Facility: CLINIC | Age: 78
End: 2023-12-11
Payer: MEDICARE

## 2023-12-11 ENCOUNTER — DOCUMENTATION (OUTPATIENT)
Dept: PHYSICAL THERAPY | Facility: HOSPITAL | Age: 78
End: 2023-12-11
Payer: MEDICARE

## 2023-12-11 NOTE — PROGRESS NOTES
Physical Therapy                 Therapy Communication Note    Patient Name: Tana Hargrove  MRN: [id number]  Today's Date: 12/11/2023     Discipline: Physical Therapy    Missed Visit Reason:      Missed Time: No Show    Comment:

## 2023-12-12 ENCOUNTER — INFUSION (OUTPATIENT)
Dept: HEMATOLOGY/ONCOLOGY | Facility: CLINIC | Age: 78
End: 2023-12-12
Payer: MEDICARE

## 2023-12-12 ENCOUNTER — LAB (OUTPATIENT)
Dept: LAB | Facility: HOSPITAL | Age: 78
End: 2023-12-12
Payer: MEDICARE

## 2023-12-12 VITALS
HEART RATE: 68 BPM | BODY MASS INDEX: 31.38 KG/M2 | RESPIRATION RATE: 16 BRPM | SYSTOLIC BLOOD PRESSURE: 158 MMHG | DIASTOLIC BLOOD PRESSURE: 78 MMHG | TEMPERATURE: 97.5 F | HEIGHT: 61 IN | OXYGEN SATURATION: 95 %

## 2023-12-12 DIAGNOSIS — D46.9 MYELODYSPLASTIC SYNDROME (MULTI): ICD-10-CM

## 2023-12-12 LAB
BASOPHILS # BLD AUTO: 0.06 X10*3/UL (ref 0–0.1)
BASOPHILS NFR BLD AUTO: 1 %
DACRYOCYTES BLD QL SMEAR: NORMAL
EOSINOPHIL # BLD AUTO: 0.07 X10*3/UL (ref 0–0.4)
EOSINOPHIL NFR BLD AUTO: 1.1 %
ERYTHROCYTE [DISTWIDTH] IN BLOOD BY AUTOMATED COUNT: 23.8 % (ref 11.5–14.5)
HCT VFR BLD AUTO: 24.2 % (ref 36–46)
HGB BLD-MCNC: 7.7 G/DL (ref 12–16)
HYPOCHROMIA BLD QL SMEAR: NORMAL
IMM GRANULOCYTES # BLD AUTO: 0.01 X10*3/UL (ref 0–0.5)
IMM GRANULOCYTES NFR BLD AUTO: 0.2 % (ref 0–0.9)
LYMPHOCYTES # BLD AUTO: 1.79 X10*3/UL (ref 0.8–3)
LYMPHOCYTES NFR BLD AUTO: 28.5 %
MCH RBC QN AUTO: 32.5 PG (ref 26–34)
MCHC RBC AUTO-ENTMCNC: 31.8 G/DL (ref 32–36)
MCV RBC AUTO: 102 FL (ref 80–100)
MONOCYTES # BLD AUTO: 0.52 X10*3/UL (ref 0.05–0.8)
MONOCYTES NFR BLD AUTO: 8.3 %
NEUTROPHILS # BLD AUTO: 3.82 X10*3/UL (ref 1.6–5.5)
NEUTROPHILS NFR BLD AUTO: 60.9 %
NRBC BLD-RTO: ABNORMAL /100{WBCS}
OVALOCYTES BLD QL SMEAR: NORMAL
PLATELET # BLD AUTO: 290 X10*3/UL (ref 150–450)
RBC # BLD AUTO: 2.37 X10*6/UL (ref 4–5.2)
RBC MORPH BLD: NORMAL
STOMATOCYTES BLD QL SMEAR: NORMAL
TARGETS BLD QL SMEAR: NORMAL
WBC # BLD AUTO: 6.3 X10*3/UL (ref 4.4–11.3)

## 2023-12-12 PROCEDURE — 36415 COLL VENOUS BLD VENIPUNCTURE: CPT

## 2023-12-12 PROCEDURE — 96372 THER/PROPH/DIAG INJ SC/IM: CPT

## 2023-12-12 PROCEDURE — 2500000004 HC RX 250 GENERAL PHARMACY W/ HCPCS (ALT 636 FOR OP/ED): Mod: JZ,JG | Performed by: NURSE PRACTITIONER

## 2023-12-12 PROCEDURE — 85025 COMPLETE CBC W/AUTO DIFF WBC: CPT

## 2023-12-12 RX ORDER — ALBUTEROL SULFATE 0.83 MG/ML
3 SOLUTION RESPIRATORY (INHALATION) AS NEEDED
Status: CANCELLED | OUTPATIENT
Start: 2023-12-19

## 2023-12-12 RX ORDER — DIPHENHYDRAMINE HYDROCHLORIDE 50 MG/ML
50 INJECTION INTRAMUSCULAR; INTRAVENOUS AS NEEDED
Status: CANCELLED | OUTPATIENT
Start: 2023-12-19

## 2023-12-12 RX ORDER — EPINEPHRINE 0.3 MG/.3ML
0.3 INJECTION SUBCUTANEOUS EVERY 5 MIN PRN
Status: CANCELLED | OUTPATIENT
Start: 2023-12-19

## 2023-12-12 RX ORDER — FAMOTIDINE 10 MG/ML
20 INJECTION INTRAVENOUS ONCE AS NEEDED
Status: CANCELLED | OUTPATIENT
Start: 2023-12-19

## 2023-12-12 RX ADMIN — ERYTHROPOIETIN 80000 UNITS: 40000 INJECTION, SOLUTION INTRAVENOUS; SUBCUTANEOUS at 14:16

## 2023-12-12 ASSESSMENT — PAIN SCALES - GENERAL: PAINLEVEL: 0-NO PAIN

## 2023-12-12 NOTE — PROGRESS NOTES
Pt here for procrit injection. Pt tolerated well. She is aware of plan of care, will call with further questions or concerns will return in 1 week for labs and possible injection

## 2023-12-13 ENCOUNTER — CLINICAL SUPPORT (OUTPATIENT)
Dept: PRIMARY CARE | Facility: CLINIC | Age: 78
End: 2023-12-13
Payer: MEDICARE

## 2023-12-13 ENCOUNTER — DOCUMENTATION (OUTPATIENT)
Dept: PHYSICAL THERAPY | Facility: HOSPITAL | Age: 78
End: 2023-12-13

## 2023-12-13 DIAGNOSIS — R30.0 DYSURIA: Primary | ICD-10-CM

## 2023-12-13 LAB
POC APPEARANCE, URINE: ABNORMAL
POC BILIRUBIN, URINE: NEGATIVE
POC BLOOD, URINE: ABNORMAL
POC COLOR, URINE: ABNORMAL
POC GLUCOSE, URINE: NEGATIVE MG/DL
POC KETONES, URINE: ABNORMAL MG/DL
POC LEUKOCYTES, URINE: ABNORMAL
POC NITRITE,URINE: NEGATIVE
POC PH, URINE: 5 PH
POC PROTEIN, URINE: ABNORMAL MG/DL
POC SPECIFIC GRAVITY, URINE: >=1.03
POC UROBILINOGEN, URINE: 1 EU/DL

## 2023-12-13 PROCEDURE — 87086 URINE CULTURE/COLONY COUNT: CPT

## 2023-12-13 PROCEDURE — 87186 SC STD MICRODIL/AGAR DIL: CPT

## 2023-12-13 PROCEDURE — 99211 OFF/OP EST MAY X REQ PHY/QHP: CPT | Performed by: STUDENT IN AN ORGANIZED HEALTH CARE EDUCATION/TRAINING PROGRAM

## 2023-12-13 PROCEDURE — 81002 URINALYSIS NONAUTO W/O SCOPE: CPT | Performed by: STUDENT IN AN ORGANIZED HEALTH CARE EDUCATION/TRAINING PROGRAM

## 2023-12-13 NOTE — PROGRESS NOTES
Physical Therapy                 Therapy Communication Note    Patient Name: Tana Hargrove  MRN: [id number]  Today's Date: 12/13/2023     Discipline: Physical Therapy    Missed Visit Reason:      Missed Time: No Show    Comment:

## 2023-12-14 ENCOUNTER — TELEPHONE (OUTPATIENT)
Dept: PRIMARY CARE | Facility: CLINIC | Age: 78
End: 2023-12-14
Payer: MEDICARE

## 2023-12-14 DIAGNOSIS — R30.0 DYSURIA: Primary | ICD-10-CM

## 2023-12-14 RX ORDER — CEPHALEXIN 500 MG/1
500 CAPSULE ORAL 2 TIMES DAILY
Qty: 10 CAPSULE | Refills: 0 | Status: SHIPPED | OUTPATIENT
Start: 2023-12-14 | End: 2023-12-16 | Stop reason: SDUPTHER

## 2023-12-14 NOTE — TELEPHONE ENCOUNTER
PATIENT CAME IN TO THE OFFICE YESTERDAY AND DROPPED OFF A URINE SPECIMEN. PATIENT WAS TOLD SHE HAD INFECTION AND A MED WOULD BE CALLED IN. PATIENT STATES THAT IT IS GETTING WORSE.

## 2023-12-14 NOTE — TELEPHONE ENCOUNTER
Patient called in stating that yesterday when she was in a medication was supposed to be called in but wasn't. Please advise.

## 2023-12-16 ENCOUNTER — TELEPHONE (OUTPATIENT)
Dept: PRIMARY CARE | Facility: CLINIC | Age: 78
End: 2023-12-16
Payer: MEDICARE

## 2023-12-16 DIAGNOSIS — R30.0 DYSURIA: ICD-10-CM

## 2023-12-16 LAB — BACTERIA UR CULT: ABNORMAL

## 2023-12-16 RX ORDER — CEPHALEXIN 250 MG/1
250 CAPSULE ORAL 2 TIMES DAILY
Qty: 14 CAPSULE | Refills: 0 | Status: SHIPPED | OUTPATIENT
Start: 2023-12-16 | End: 2023-12-28 | Stop reason: ALTCHOICE

## 2023-12-18 ENCOUNTER — TREATMENT (OUTPATIENT)
Dept: PHYSICAL THERAPY | Facility: HOSPITAL | Age: 78
End: 2023-12-18
Payer: MEDICARE

## 2023-12-18 ENCOUNTER — APPOINTMENT (OUTPATIENT)
Dept: HEMATOLOGY/ONCOLOGY | Facility: CLINIC | Age: 78
End: 2023-12-18
Payer: MEDICARE

## 2023-12-18 DIAGNOSIS — M25.561 CHRONIC PAIN OF RIGHT KNEE: ICD-10-CM

## 2023-12-18 DIAGNOSIS — R53.1 WEAKNESS GENERALIZED: ICD-10-CM

## 2023-12-18 DIAGNOSIS — G89.29 CHRONIC PAIN OF RIGHT KNEE: ICD-10-CM

## 2023-12-18 DIAGNOSIS — G89.29 CHRONIC PAIN OF MULTIPLE JOINTS: ICD-10-CM

## 2023-12-18 DIAGNOSIS — M25.50 CHRONIC PAIN OF MULTIPLE JOINTS: ICD-10-CM

## 2023-12-18 PROCEDURE — 97110 THERAPEUTIC EXERCISES: CPT | Mod: GP

## 2023-12-18 PROCEDURE — 97112 NEUROMUSCULAR REEDUCATION: CPT | Mod: GP

## 2023-12-18 ASSESSMENT — PAIN SCALES - GENERAL: PAINLEVEL_OUTOF10: 8

## 2023-12-18 ASSESSMENT — PAIN - FUNCTIONAL ASSESSMENT: PAIN_FUNCTIONAL_ASSESSMENT: 0-10

## 2023-12-18 NOTE — PROGRESS NOTES
"            Precautions  STEADI Fall Risk Score (The score of 4 or more indicates an increased risk of falling): 9     Physical Therapy    Physical Therapy Treatment    Patient Name: Tana Hargrove  MRN: [id number]  : 1945   Today's Date: 2023  Time Calculation  Start Time: 1044  Stop Time: 1118  Time Calculation (min): 34 min  Visit #3          Current Problem  1. Chronic pain of right knee  Follow Up In Physical Therapy      2. Weakness generalized  Follow Up In Physical Therapy      3. Chronic pain of multiple joints  Follow Up In Physical Therapy            Subjective   Pt reports that she feels awful, has pain all over, denies falling since last visit       Precautions  Precautions  STEADI Fall Risk Score (The score of 4 or more indicates an increased risk of falling): 9       Pain  Pain Assessment: 0-10  Pain Score: 8  Pain Type: Chronic pain  Pain Location: Knee  Pain Orientation: Right    Objective            Treatments:                                         Date:  HD  HD                                             VISIT# #1 #2 #3 #    EXERCISES REPS REPS REPS REPS            NuStep   L1 x 10' W/ MH L1 x 10 w/MH             RB Calf Str  3 x 30\" 3 x 30\"     HS Str  3 x 30\"      Standing          Hip abduction          Hip Flexion  2x10              Ball Squeeze  2 x 10 x 5\" H 2 x 10     Hip Abd seated  2 x 10 x gtb              Parallel Bars:        Lateral    2 laps     Retro   1 lap                     Shuttle DLP                                                                                                        HEP     X        Assessment:  Pt tolerated treatment without complaint of increase in symptoms, responded to treatment with report of decrease in pain, minimal progress due to pain and decreased treatment time  Plan:              Goals:  Active       Right Knee pain       PT Goal 1       Start:  23    Expected End:  23       Pt will demonstrate " independence with HEP to reinforce gains made in treatment          PT Goal 2       Start:  11/14/23    Expected End:  12/08/23       Pt will report having a decrease in right knee pain to 6/10 at its worst for increased tolerance for closed chain activities          PT Goal 3       Start:  11/14/23    Expected End:  12/22/23       Pt will have an increase in right LE strength by 1/3 MMT grade to improve balance and mobility          PT Goal 4       Start:  11/14/23    Expected End:  12/08/23       Perform Gore Balance assessment              .  .

## 2023-12-19 ENCOUNTER — LAB (OUTPATIENT)
Dept: LAB | Facility: HOSPITAL | Age: 78
End: 2023-12-19
Payer: MEDICARE

## 2023-12-19 ENCOUNTER — INFUSION (OUTPATIENT)
Dept: HEMATOLOGY/ONCOLOGY | Facility: CLINIC | Age: 78
End: 2023-12-19
Payer: MEDICARE

## 2023-12-19 VITALS
RESPIRATION RATE: 16 BRPM | DIASTOLIC BLOOD PRESSURE: 82 MMHG | OXYGEN SATURATION: 97 % | HEART RATE: 71 BPM | TEMPERATURE: 98.1 F | SYSTOLIC BLOOD PRESSURE: 158 MMHG | BODY MASS INDEX: 31.38 KG/M2 | HEIGHT: 61 IN

## 2023-12-19 DIAGNOSIS — D46.9 MYELODYSPLASTIC SYNDROME (MULTI): ICD-10-CM

## 2023-12-19 LAB
BASOPHILS # BLD AUTO: 0.05 X10*3/UL (ref 0–0.1)
BASOPHILS NFR BLD AUTO: 0.9 %
DACRYOCYTES BLD QL SMEAR: NORMAL
EOSINOPHIL # BLD AUTO: 0.19 X10*3/UL (ref 0–0.4)
EOSINOPHIL NFR BLD AUTO: 3.6 %
ERYTHROCYTE [DISTWIDTH] IN BLOOD BY AUTOMATED COUNT: 24 % (ref 11.5–14.5)
HCT VFR BLD AUTO: 23 % (ref 36–46)
HGB BLD-MCNC: 7.2 G/DL (ref 12–16)
HYPOCHROMIA BLD QL SMEAR: NORMAL
IMM GRANULOCYTES # BLD AUTO: 0.02 X10*3/UL (ref 0–0.5)
IMM GRANULOCYTES NFR BLD AUTO: 0.4 % (ref 0–0.9)
LYMPHOCYTES # BLD AUTO: 1.56 X10*3/UL (ref 0.8–3)
LYMPHOCYTES NFR BLD AUTO: 29.5 %
MCH RBC QN AUTO: 32.4 PG (ref 26–34)
MCHC RBC AUTO-ENTMCNC: 31.3 G/DL (ref 32–36)
MCV RBC AUTO: 104 FL (ref 80–100)
MONOCYTES # BLD AUTO: 0.49 X10*3/UL (ref 0.05–0.8)
MONOCYTES NFR BLD AUTO: 9.3 %
NEUTROPHILS # BLD AUTO: 2.97 X10*3/UL (ref 1.6–5.5)
NEUTROPHILS NFR BLD AUTO: 56.3 %
NRBC BLD-RTO: ABNORMAL /100{WBCS}
OVALOCYTES BLD QL SMEAR: NORMAL
PLATELET # BLD AUTO: 367 X10*3/UL (ref 150–450)
RBC # BLD AUTO: 2.22 X10*6/UL (ref 4–5.2)
RBC MORPH BLD: NORMAL
TARGETS BLD QL SMEAR: NORMAL
WBC # BLD AUTO: 5.3 X10*3/UL (ref 4.4–11.3)

## 2023-12-19 PROCEDURE — 85025 COMPLETE CBC W/AUTO DIFF WBC: CPT

## 2023-12-19 PROCEDURE — 36415 COLL VENOUS BLD VENIPUNCTURE: CPT

## 2023-12-19 PROCEDURE — 96372 THER/PROPH/DIAG INJ SC/IM: CPT

## 2023-12-19 PROCEDURE — 2500000004 HC RX 250 GENERAL PHARMACY W/ HCPCS (ALT 636 FOR OP/ED): Mod: JZ,JG | Performed by: NURSE PRACTITIONER

## 2023-12-19 RX ORDER — EPINEPHRINE 0.3 MG/.3ML
0.3 INJECTION SUBCUTANEOUS EVERY 5 MIN PRN
Status: CANCELLED | OUTPATIENT
Start: 2023-12-26

## 2023-12-19 RX ORDER — FAMOTIDINE 10 MG/ML
20 INJECTION INTRAVENOUS ONCE AS NEEDED
Status: CANCELLED | OUTPATIENT
Start: 2023-12-26

## 2023-12-19 RX ORDER — DIPHENHYDRAMINE HYDROCHLORIDE 50 MG/ML
50 INJECTION INTRAMUSCULAR; INTRAVENOUS AS NEEDED
Status: CANCELLED | OUTPATIENT
Start: 2023-12-26

## 2023-12-19 RX ORDER — ALBUTEROL SULFATE 0.83 MG/ML
3 SOLUTION RESPIRATORY (INHALATION) AS NEEDED
Status: CANCELLED | OUTPATIENT
Start: 2023-12-26

## 2023-12-19 RX ADMIN — ERYTHROPOIETIN 80000 UNITS: 40000 INJECTION, SOLUTION INTRAVENOUS; SUBCUTANEOUS at 11:01

## 2023-12-19 ASSESSMENT — PAIN SCALES - GENERAL: PAINLEVEL: 0-NO PAIN

## 2023-12-19 NOTE — PROGRESS NOTES
.Patient tolerated Procrit shot well, no reaction noted. Patient feels well and has no complaints, vital signs stable. Patient discharged in stable condition with no further needs. Patient returns on 12/26/2023 for labs and possible shot.

## 2023-12-20 ENCOUNTER — TREATMENT (OUTPATIENT)
Dept: PHYSICAL THERAPY | Facility: HOSPITAL | Age: 78
End: 2023-12-20
Payer: MEDICARE

## 2023-12-20 DIAGNOSIS — M25.561 CHRONIC PAIN OF RIGHT KNEE: ICD-10-CM

## 2023-12-20 DIAGNOSIS — G89.29 CHRONIC PAIN OF MULTIPLE JOINTS: ICD-10-CM

## 2023-12-20 DIAGNOSIS — R53.1 WEAKNESS GENERALIZED: ICD-10-CM

## 2023-12-20 DIAGNOSIS — G89.29 CHRONIC PAIN OF RIGHT KNEE: ICD-10-CM

## 2023-12-20 DIAGNOSIS — M25.50 CHRONIC PAIN OF MULTIPLE JOINTS: ICD-10-CM

## 2023-12-20 PROCEDURE — 97112 NEUROMUSCULAR REEDUCATION: CPT | Mod: GP

## 2023-12-20 PROCEDURE — 97530 THERAPEUTIC ACTIVITIES: CPT | Mod: GP

## 2023-12-20 ASSESSMENT — PAIN SCALES - GENERAL
PAINLEVEL_OUTOF10: 5 - MODERATE PAIN
PAINLEVEL_OUTOF10: 5 - MODERATE PAIN

## 2023-12-20 ASSESSMENT — PAIN - FUNCTIONAL ASSESSMENT: PAIN_FUNCTIONAL_ASSESSMENT: 0-10

## 2023-12-20 NOTE — PROGRESS NOTES
"Physical Therapy    Physical Therapy Treatment    Patient Name: Tana Hargrove  MRN: [id number]  : 1945   Today's Date: 2023     Visit #4        Current Problem  1. Chronic pain of right knee  Follow Up In Physical Therapy      2. Weakness generalized  Follow Up In Physical Therapy      3. Chronic pain of multiple joints  Follow Up In Physical Therapy            Subjective   Pt states that she feels pretty good today, denies falling since last visit.        Precautions  Precautions  STEADI Fall Risk Score (The score of 4 or more indicates an increased risk of falling): 9       Pain  Pain Assessment: 0-10  Pain Score: 5 - Moderate pain  Pain Type: Chronic pain  Pain Location: Knee  Pain Orientation: Right  Pt rates pain at 5/10 upon arrival  Objective            Treatments:                                         Date:  HD  HD                                            VISIT# #1 #2 #3 #4    EXERCISES REPS REPS REPS REPS            NuStep   L1 x 10' W/ MH L1 x 10 w/MH L1 x 10            RB Calf Str  3 x 30\" 3 x 30\" 3 x 30\"    HS Str  3 x 30\" 3 x 30\"     Standing          Hip abduction          Hip Flexion  2x10              Ball Squeeze  2 x 10 x 5\" H  2 x 10 x 5\"    Hip Abd seated  2 x 10 x gtb  2 x 15 x gtb            Parallel Bars:        Lateral    2 laps 1 lap    Retro   L1 lap 2 laps    March    2 laps                    Cane - lunge over-back    X10 ea 1 UE                                                                                            HEP     X        Assessment:  Pt tolerated treatment without complaint of increase in symptoms, responding to treatment with increased closed chain activity tolerance, progressing with strengthening and balance to improve gait and safety        Plan:     Progress with strengthening and balance activities. Perform Gore Balance test next visit         Goals:  Active       Right Knee pain       PT Goal 1       Start:  23    Expected " End:  12/08/23       Pt will demonstrate independence with HEP to reinforce gains made in treatment          PT Goal 2       Start:  11/14/23    Expected End:  12/08/23       Pt will report having a decrease in right knee pain to 6/10 at its worst for increased tolerance for closed chain activities          PT Goal 3       Start:  11/14/23    Expected End:  12/22/23       Pt will have an increase in right LE strength by 1/3 MMT grade to improve balance and mobility          PT Goal 4       Start:  11/14/23    Expected End:  12/08/23       Perform Gore Balance assessment              .

## 2023-12-26 ENCOUNTER — DOCUMENTATION (OUTPATIENT)
Dept: HEMATOLOGY/ONCOLOGY | Facility: HOSPITAL | Age: 78
End: 2023-12-26
Payer: MEDICARE

## 2023-12-26 ENCOUNTER — INFUSION (OUTPATIENT)
Dept: HEMATOLOGY/ONCOLOGY | Facility: CLINIC | Age: 78
End: 2023-12-26
Payer: MEDICARE

## 2023-12-26 ENCOUNTER — LAB (OUTPATIENT)
Dept: LAB | Facility: HOSPITAL | Age: 78
End: 2023-12-26
Payer: MEDICARE

## 2023-12-26 ENCOUNTER — APPOINTMENT (OUTPATIENT)
Dept: HEMATOLOGY/ONCOLOGY | Facility: CLINIC | Age: 78
End: 2023-12-26
Payer: MEDICARE

## 2023-12-26 VITALS
HEIGHT: 61 IN | DIASTOLIC BLOOD PRESSURE: 64 MMHG | BODY MASS INDEX: 31.38 KG/M2 | OXYGEN SATURATION: 96 % | RESPIRATION RATE: 16 BRPM | HEART RATE: 80 BPM | SYSTOLIC BLOOD PRESSURE: 147 MMHG | TEMPERATURE: 97.2 F

## 2023-12-26 DIAGNOSIS — D46.9 MYELODYSPLASTIC SYNDROME (MULTI): ICD-10-CM

## 2023-12-26 DIAGNOSIS — D64.9 ANEMIA, UNSPECIFIED TYPE: ICD-10-CM

## 2023-12-26 DIAGNOSIS — D64.9 ANEMIA, UNSPECIFIED TYPE: Primary | ICD-10-CM

## 2023-12-26 LAB
ABO GROUP (TYPE) IN BLOOD: NORMAL
ANTIBODY SCREEN: NORMAL
BASOPHILS # BLD AUTO: 0.04 X10*3/UL (ref 0–0.1)
BASOPHILS NFR BLD AUTO: 0.9 %
BB ANTIBODY IDENTIFICATION: NORMAL
CASE #: NORMAL
DACRYOCYTES BLD QL SMEAR: NORMAL
EOSINOPHIL # BLD AUTO: 0.11 X10*3/UL (ref 0–0.4)
EOSINOPHIL NFR BLD AUTO: 2.4 %
ERYTHROCYTE [DISTWIDTH] IN BLOOD BY AUTOMATED COUNT: 24.8 % (ref 11.5–14.5)
HCT VFR BLD AUTO: 20.4 % (ref 36–46)
HGB BLD-MCNC: 6.4 G/DL (ref 12–16)
HYPOCHROMIA BLD QL SMEAR: NORMAL
IMM GRANULOCYTES # BLD AUTO: 0.02 X10*3/UL (ref 0–0.5)
IMM GRANULOCYTES NFR BLD AUTO: 0.4 % (ref 0–0.9)
LYMPHOCYTES # BLD AUTO: 1.17 X10*3/UL (ref 0.8–3)
LYMPHOCYTES NFR BLD AUTO: 25.1 %
MCH RBC QN AUTO: 32.7 PG (ref 26–34)
MCHC RBC AUTO-ENTMCNC: 31.4 G/DL (ref 32–36)
MCV RBC AUTO: 104 FL (ref 80–100)
MONOCYTES # BLD AUTO: 0.41 X10*3/UL (ref 0.05–0.8)
MONOCYTES NFR BLD AUTO: 8.8 %
NEUTROPHILS # BLD AUTO: 2.92 X10*3/UL (ref 1.6–5.5)
NEUTROPHILS NFR BLD AUTO: 62.4 %
NRBC BLD-RTO: ABNORMAL /100{WBCS}
OVALOCYTES BLD QL SMEAR: NORMAL
PLATELET # BLD AUTO: 280 X10*3/UL (ref 150–450)
RBC # BLD AUTO: 1.96 X10*6/UL (ref 4–5.2)
RBC MORPH BLD: NORMAL
RH FACTOR (ANTIGEN D): NORMAL
TARGETS BLD QL SMEAR: NORMAL
WBC # BLD AUTO: 4.7 X10*3/UL (ref 4.4–11.3)

## 2023-12-26 PROCEDURE — 96372 THER/PROPH/DIAG INJ SC/IM: CPT

## 2023-12-26 PROCEDURE — 36415 COLL VENOUS BLD VENIPUNCTURE: CPT

## 2023-12-26 PROCEDURE — 85025 COMPLETE CBC W/AUTO DIFF WBC: CPT

## 2023-12-26 PROCEDURE — 86870 RBC ANTIBODY IDENTIFICATION: CPT

## 2023-12-26 PROCEDURE — 2500000004 HC RX 250 GENERAL PHARMACY W/ HCPCS (ALT 636 FOR OP/ED): Mod: JZ,JG,MUE | Performed by: NURSE PRACTITIONER

## 2023-12-26 PROCEDURE — 86901 BLOOD TYPING SEROLOGIC RH(D): CPT

## 2023-12-26 RX ORDER — EPINEPHRINE 0.3 MG/.3ML
0.3 INJECTION SUBCUTANEOUS EVERY 5 MIN PRN
Status: CANCELLED | OUTPATIENT
Start: 2024-01-02

## 2023-12-26 RX ORDER — ALBUTEROL SULFATE 0.83 MG/ML
3 SOLUTION RESPIRATORY (INHALATION) AS NEEDED
Status: CANCELLED | OUTPATIENT
Start: 2024-01-02

## 2023-12-26 RX ORDER — DIPHENHYDRAMINE HYDROCHLORIDE 50 MG/ML
50 INJECTION INTRAMUSCULAR; INTRAVENOUS AS NEEDED
Status: CANCELLED | OUTPATIENT
Start: 2024-01-02

## 2023-12-26 RX ORDER — FAMOTIDINE 10 MG/ML
20 INJECTION INTRAVENOUS ONCE AS NEEDED
Status: CANCELLED | OUTPATIENT
Start: 2024-01-02

## 2023-12-26 RX ADMIN — ERYTHROPOIETIN 80000 UNITS: 40000 INJECTION, SOLUTION INTRAVENOUS; SUBCUTANEOUS at 13:55

## 2023-12-26 ASSESSMENT — PAIN SCALES - GENERAL: PAINLEVEL: 0-NO PAIN

## 2023-12-27 ENCOUNTER — TELEPHONE (OUTPATIENT)
Dept: HEMATOLOGY/ONCOLOGY | Facility: CLINIC | Age: 78
End: 2023-12-27
Payer: MEDICARE

## 2023-12-27 NOTE — TELEPHONE ENCOUNTER
12/26, Tana's hgb was 6.4, she stated she was tired but denied dizziness or SOB. Tana std she would be able to wait until Thurs to receive blood at the infusion center, she std she didn't want to go to  ER. Explained that if her symptoms worsened before Thurs she would need to go to ER. Information forward to provider covering for Abhijeet Jesus ordered T&S and 1 unit PRBC. Tana aware and agreeable.

## 2023-12-28 ENCOUNTER — DOCUMENTATION (OUTPATIENT)
Dept: PHYSICAL THERAPY | Facility: HOSPITAL | Age: 78
End: 2023-12-28

## 2023-12-28 ENCOUNTER — INFUSION (OUTPATIENT)
Dept: INFUSION THERAPY | Facility: HOSPITAL | Age: 78
End: 2023-12-28
Payer: MEDICARE

## 2023-12-28 ENCOUNTER — APPOINTMENT (OUTPATIENT)
Dept: PHYSICAL THERAPY | Facility: HOSPITAL | Age: 78
End: 2023-12-28
Payer: MEDICARE

## 2023-12-28 VITALS
OXYGEN SATURATION: 97 % | RESPIRATION RATE: 20 BRPM | HEART RATE: 79 BPM | SYSTOLIC BLOOD PRESSURE: 182 MMHG | TEMPERATURE: 97.9 F | DIASTOLIC BLOOD PRESSURE: 70 MMHG

## 2023-12-28 DIAGNOSIS — D53.1 OTHER MEGALOBLASTIC ANEMIA: ICD-10-CM

## 2023-12-28 DIAGNOSIS — D46.9 MYELODYSPLASTIC SYNDROME (MULTI): ICD-10-CM

## 2023-12-28 LAB
BLOOD EXPIRATION DATE: NORMAL
BLOOD EXPIRATION DATE: NORMAL
DISPENSE STATUS: NORMAL
DISPENSE STATUS: NORMAL
PRODUCT BLOOD TYPE: 5100
PRODUCT BLOOD TYPE: 6200
PRODUCT CODE: NORMAL
PRODUCT CODE: NORMAL
UNIT ABO: NORMAL
UNIT ABO: NORMAL
UNIT NUMBER: NORMAL
UNIT NUMBER: NORMAL
UNIT RH: NORMAL
UNIT RH: NORMAL
UNIT VOLUME: 350
UNIT VOLUME: 350
XM INTEP: NORMAL
XM INTEP: NORMAL

## 2023-12-28 PROCEDURE — 36430 TRANSFUSION BLD/BLD COMPNT: CPT

## 2023-12-28 PROCEDURE — 86902 BLOOD TYPE ANTIGEN DONOR EA: CPT

## 2023-12-28 PROCEDURE — 86922 COMPATIBILITY TEST ANTIGLOB: CPT

## 2023-12-28 PROCEDURE — P9040 RBC LEUKOREDUCED IRRADIATED: HCPCS

## 2023-12-28 RX ORDER — ALBUTEROL SULFATE 0.83 MG/ML
3 SOLUTION RESPIRATORY (INHALATION) AS NEEDED
Status: CANCELLED | OUTPATIENT
Start: 2023-12-28

## 2023-12-28 RX ORDER — FAMOTIDINE 10 MG/ML
20 INJECTION INTRAVENOUS ONCE AS NEEDED
Status: CANCELLED | OUTPATIENT
Start: 2023-12-28

## 2023-12-28 RX ORDER — DIPHENHYDRAMINE HYDROCHLORIDE 50 MG/ML
50 INJECTION INTRAMUSCULAR; INTRAVENOUS AS NEEDED
Status: CANCELLED | OUTPATIENT
Start: 2023-12-28

## 2023-12-28 RX ORDER — EPINEPHRINE 0.3 MG/.3ML
0.3 INJECTION SUBCUTANEOUS EVERY 5 MIN PRN
Status: CANCELLED | OUTPATIENT
Start: 2023-12-28

## 2023-12-28 RX ORDER — HEPARIN 100 UNIT/ML
500 SYRINGE INTRAVENOUS AS NEEDED
Status: CANCELLED | OUTPATIENT
Start: 2023-12-28

## 2023-12-28 RX ORDER — HEPARIN SODIUM,PORCINE/PF 10 UNIT/ML
50 SYRINGE (ML) INTRAVENOUS AS NEEDED
Status: CANCELLED | OUTPATIENT
Start: 2023-12-28

## 2023-12-28 ASSESSMENT — COLUMBIA-SUICIDE SEVERITY RATING SCALE - C-SSRS
2. HAVE YOU ACTUALLY HAD ANY THOUGHTS OF KILLING YOURSELF?: NO
6. HAVE YOU EVER DONE ANYTHING, STARTED TO DO ANYTHING, OR PREPARED TO DO ANYTHING TO END YOUR LIFE?: NO
1. IN THE PAST MONTH, HAVE YOU WISHED YOU WERE DEAD OR WISHED YOU COULD GO TO SLEEP AND NOT WAKE UP?: NO

## 2023-12-28 ASSESSMENT — PAIN SCALES - GENERAL: PAINLEVEL: 0-NO PAIN

## 2023-12-28 ASSESSMENT — ENCOUNTER SYMPTOMS
OCCASIONAL FEELINGS OF UNSTEADINESS: 1
DEPRESSION: 1
LOSS OF SENSATION IN FEET: 1

## 2023-12-28 ASSESSMENT — PATIENT HEALTH QUESTIONNAIRE - PHQ9
SUM OF ALL RESPONSES TO PHQ9 QUESTIONS 1 AND 2: 1
2. FEELING DOWN, DEPRESSED OR HOPELESS: NOT AT ALL
1. LITTLE INTEREST OR PLEASURE IN DOING THINGS: SEVERAL DAYS
10. IF YOU CHECKED OFF ANY PROBLEMS, HOW DIFFICULT HAVE THESE PROBLEMS MADE IT FOR YOU TO DO YOUR WORK, TAKE CARE OF THINGS AT HOME, OR GET ALONG WITH OTHER PEOPLE: NOT DIFFICULT AT ALL

## 2023-12-28 NOTE — PROGRESS NOTES
Physical Therapy                 Therapy Communication Note    Patient Name: Tana Hargrove  MRN: [id number]  Today's Date: 12/28/2023     Discipline: Physical Therapy    Missed Visit Reason:  Has infusion appt    Missed Time: Cancel    Comment:

## 2024-01-02 ENCOUNTER — LAB (OUTPATIENT)
Dept: LAB | Facility: HOSPITAL | Age: 79
End: 2024-01-02
Payer: MEDICARE

## 2024-01-02 ENCOUNTER — INFUSION (OUTPATIENT)
Dept: HEMATOLOGY/ONCOLOGY | Facility: CLINIC | Age: 79
End: 2024-01-02
Payer: MEDICARE

## 2024-01-02 VITALS
DIASTOLIC BLOOD PRESSURE: 71 MMHG | TEMPERATURE: 97.2 F | HEIGHT: 61 IN | BODY MASS INDEX: 31.75 KG/M2 | RESPIRATION RATE: 16 BRPM | SYSTOLIC BLOOD PRESSURE: 152 MMHG | HEART RATE: 69 BPM

## 2024-01-02 DIAGNOSIS — D46.9 MYELODYSPLASTIC SYNDROME (MULTI): ICD-10-CM

## 2024-01-02 LAB
BASOPHILS # BLD AUTO: 0.05 X10*3/UL (ref 0–0.1)
BASOPHILS NFR BLD AUTO: 0.9 %
EOSINOPHIL # BLD AUTO: 0.13 X10*3/UL (ref 0–0.4)
EOSINOPHIL NFR BLD AUTO: 2.3 %
ERYTHROCYTE [DISTWIDTH] IN BLOOD BY AUTOMATED COUNT: 24 % (ref 11.5–14.5)
HCT VFR BLD AUTO: 28.3 % (ref 36–46)
HGB BLD-MCNC: 9 G/DL (ref 12–16)
IMM GRANULOCYTES # BLD AUTO: 0.01 X10*3/UL (ref 0–0.5)
IMM GRANULOCYTES NFR BLD AUTO: 0.2 % (ref 0–0.9)
LYMPHOCYTES # BLD AUTO: 1.6 X10*3/UL (ref 0.8–3)
LYMPHOCYTES NFR BLD AUTO: 28.3 %
MCH RBC QN AUTO: 31.1 PG (ref 26–34)
MCHC RBC AUTO-ENTMCNC: 31.8 G/DL (ref 32–36)
MCV RBC AUTO: 98 FL (ref 80–100)
MONOCYTES # BLD AUTO: 0.5 X10*3/UL (ref 0.05–0.8)
MONOCYTES NFR BLD AUTO: 8.8 %
NEUTROPHILS # BLD AUTO: 3.37 X10*3/UL (ref 1.6–5.5)
NEUTROPHILS NFR BLD AUTO: 59.5 %
NRBC BLD-RTO: ABNORMAL /100{WBCS}
OVALOCYTES BLD QL SMEAR: NORMAL
PLATELET # BLD AUTO: 310 X10*3/UL (ref 150–450)
RBC # BLD AUTO: 2.89 X10*6/UL (ref 4–5.2)
RBC MORPH BLD: NORMAL
SCHISTOCYTES BLD QL SMEAR: NORMAL
WBC # BLD AUTO: 5.7 X10*3/UL (ref 4.4–11.3)

## 2024-01-02 PROCEDURE — 36415 COLL VENOUS BLD VENIPUNCTURE: CPT

## 2024-01-02 PROCEDURE — 96360 HYDRATION IV INFUSION INIT: CPT | Mod: INF

## 2024-01-02 PROCEDURE — 2500000004 HC RX 250 GENERAL PHARMACY W/ HCPCS (ALT 636 FOR OP/ED): Mod: JZ,JG,MUE | Performed by: NURSE PRACTITIONER

## 2024-01-02 PROCEDURE — 85025 COMPLETE CBC W/AUTO DIFF WBC: CPT

## 2024-01-02 RX ORDER — FAMOTIDINE 10 MG/ML
20 INJECTION INTRAVENOUS ONCE AS NEEDED
Status: CANCELLED | OUTPATIENT
Start: 2024-01-09

## 2024-01-02 RX ORDER — DIPHENHYDRAMINE HYDROCHLORIDE 50 MG/ML
50 INJECTION INTRAMUSCULAR; INTRAVENOUS AS NEEDED
Status: CANCELLED | OUTPATIENT
Start: 2024-01-09

## 2024-01-02 RX ORDER — ALBUTEROL SULFATE 0.83 MG/ML
3 SOLUTION RESPIRATORY (INHALATION) AS NEEDED
Status: CANCELLED | OUTPATIENT
Start: 2024-01-09

## 2024-01-02 RX ORDER — EPINEPHRINE 0.3 MG/.3ML
0.3 INJECTION SUBCUTANEOUS EVERY 5 MIN PRN
Status: CANCELLED | OUTPATIENT
Start: 2024-01-09

## 2024-01-02 RX ADMIN — ERYTHROPOIETIN 80000 UNITS: 40000 INJECTION, SOLUTION INTRAVENOUS; SUBCUTANEOUS at 13:39

## 2024-01-02 ASSESSMENT — PAIN SCALES - GENERAL: PAINLEVEL: 0-NO PAIN

## 2024-01-02 NOTE — PROGRESS NOTES
Patient here for retacrit injection tolerated well. Patient to return 01/08 for labs and next treatment if needed. Patient denies any questions at this time. Patient discharged in stable condition.

## 2024-01-08 ENCOUNTER — TREATMENT (OUTPATIENT)
Dept: PHYSICAL THERAPY | Facility: HOSPITAL | Age: 79
End: 2024-01-08
Payer: MEDICARE

## 2024-01-08 ENCOUNTER — INFUSION (OUTPATIENT)
Dept: HEMATOLOGY/ONCOLOGY | Facility: CLINIC | Age: 79
End: 2024-01-08
Payer: MEDICARE

## 2024-01-08 ENCOUNTER — LAB (OUTPATIENT)
Dept: LAB | Facility: HOSPITAL | Age: 79
End: 2024-01-08
Payer: MEDICARE

## 2024-01-08 VITALS
OXYGEN SATURATION: 98 % | HEIGHT: 61 IN | BODY MASS INDEX: 31.34 KG/M2 | DIASTOLIC BLOOD PRESSURE: 65 MMHG | SYSTOLIC BLOOD PRESSURE: 163 MMHG | HEART RATE: 89 BPM | WEIGHT: 166.01 LBS | TEMPERATURE: 98.2 F | RESPIRATION RATE: 16 BRPM

## 2024-01-08 DIAGNOSIS — M25.561 CHRONIC PAIN OF RIGHT KNEE: ICD-10-CM

## 2024-01-08 DIAGNOSIS — D46.9 MYELODYSPLASTIC SYNDROME (MULTI): ICD-10-CM

## 2024-01-08 DIAGNOSIS — G89.29 CHRONIC PAIN OF RIGHT KNEE: ICD-10-CM

## 2024-01-08 DIAGNOSIS — R53.1 WEAKNESS GENERALIZED: ICD-10-CM

## 2024-01-08 DIAGNOSIS — M25.50 CHRONIC PAIN OF MULTIPLE JOINTS: ICD-10-CM

## 2024-01-08 DIAGNOSIS — G89.29 CHRONIC PAIN OF MULTIPLE JOINTS: ICD-10-CM

## 2024-01-08 LAB
BASOPHILS # BLD AUTO: 0.05 X10*3/UL (ref 0–0.1)
BASOPHILS NFR BLD AUTO: 0.9 %
EOSINOPHIL # BLD AUTO: 0.15 X10*3/UL (ref 0–0.4)
EOSINOPHIL NFR BLD AUTO: 2.6 %
ERYTHROCYTE [DISTWIDTH] IN BLOOD BY AUTOMATED COUNT: 24 % (ref 11.5–14.5)
HCT VFR BLD AUTO: 26.8 % (ref 36–46)
HGB BLD-MCNC: 8.5 G/DL (ref 12–16)
HYPOCHROMIA BLD QL SMEAR: NORMAL
IMM GRANULOCYTES # BLD AUTO: 0.01 X10*3/UL (ref 0–0.5)
IMM GRANULOCYTES NFR BLD AUTO: 0.2 % (ref 0–0.9)
LYMPHOCYTES # BLD AUTO: 1.29 X10*3/UL (ref 0.8–3)
LYMPHOCYTES NFR BLD AUTO: 22.6 %
MCH RBC QN AUTO: 31.6 PG (ref 26–34)
MCHC RBC AUTO-ENTMCNC: 31.7 G/DL (ref 32–36)
MCV RBC AUTO: 100 FL (ref 80–100)
MONOCYTES # BLD AUTO: 0.39 X10*3/UL (ref 0.05–0.8)
MONOCYTES NFR BLD AUTO: 6.8 %
NEUTROPHILS # BLD AUTO: 3.83 X10*3/UL (ref 1.6–5.5)
NEUTROPHILS NFR BLD AUTO: 66.9 %
NRBC BLD-RTO: ABNORMAL /100{WBCS}
OVALOCYTES BLD QL SMEAR: NORMAL
PLATELET # BLD AUTO: 306 X10*3/UL (ref 150–450)
RBC # BLD AUTO: 2.69 X10*6/UL (ref 4–5.2)
RBC MORPH BLD: NORMAL
WBC # BLD AUTO: 5.7 X10*3/UL (ref 4.4–11.3)

## 2024-01-08 PROCEDURE — 2500000004 HC RX 250 GENERAL PHARMACY W/ HCPCS (ALT 636 FOR OP/ED): Mod: JZ,JG,MUE | Performed by: NURSE PRACTITIONER

## 2024-01-08 PROCEDURE — 36415 COLL VENOUS BLD VENIPUNCTURE: CPT

## 2024-01-08 PROCEDURE — 97110 THERAPEUTIC EXERCISES: CPT | Mod: GP

## 2024-01-08 PROCEDURE — 96372 THER/PROPH/DIAG INJ SC/IM: CPT | Performed by: NURSE PRACTITIONER

## 2024-01-08 PROCEDURE — 85025 COMPLETE CBC W/AUTO DIFF WBC: CPT

## 2024-01-08 PROCEDURE — 96372 THER/PROPH/DIAG INJ SC/IM: CPT

## 2024-01-08 RX ORDER — DIPHENHYDRAMINE HYDROCHLORIDE 50 MG/ML
50 INJECTION INTRAMUSCULAR; INTRAVENOUS AS NEEDED
Status: DISCONTINUED | OUTPATIENT
Start: 2024-01-08 | End: 2024-01-08 | Stop reason: HOSPADM

## 2024-01-08 RX ORDER — FAMOTIDINE 10 MG/ML
20 INJECTION INTRAVENOUS ONCE AS NEEDED
Status: CANCELLED | OUTPATIENT
Start: 2024-01-09

## 2024-01-08 RX ORDER — EPINEPHRINE 0.3 MG/.3ML
0.3 INJECTION SUBCUTANEOUS EVERY 5 MIN PRN
Status: DISCONTINUED | OUTPATIENT
Start: 2024-01-08 | End: 2024-01-08 | Stop reason: HOSPADM

## 2024-01-08 RX ORDER — FAMOTIDINE 10 MG/ML
20 INJECTION INTRAVENOUS ONCE AS NEEDED
Status: DISCONTINUED | OUTPATIENT
Start: 2024-01-08 | End: 2024-01-08 | Stop reason: HOSPADM

## 2024-01-08 RX ORDER — EPINEPHRINE 0.3 MG/.3ML
0.3 INJECTION SUBCUTANEOUS EVERY 5 MIN PRN
Status: CANCELLED | OUTPATIENT
Start: 2024-01-09

## 2024-01-08 RX ORDER — DIPHENHYDRAMINE HYDROCHLORIDE 50 MG/ML
50 INJECTION INTRAMUSCULAR; INTRAVENOUS AS NEEDED
Status: CANCELLED | OUTPATIENT
Start: 2024-01-09

## 2024-01-08 RX ORDER — ALBUTEROL SULFATE 0.83 MG/ML
3 SOLUTION RESPIRATORY (INHALATION) AS NEEDED
Status: DISCONTINUED | OUTPATIENT
Start: 2024-01-08 | End: 2024-01-08 | Stop reason: HOSPADM

## 2024-01-08 RX ORDER — ALBUTEROL SULFATE 0.83 MG/ML
3 SOLUTION RESPIRATORY (INHALATION) AS NEEDED
Status: CANCELLED | OUTPATIENT
Start: 2024-01-09

## 2024-01-08 RX ADMIN — ERYTHROPOIETIN 80000 UNITS: 40000 INJECTION, SOLUTION INTRAVENOUS; SUBCUTANEOUS at 14:50

## 2024-01-08 ASSESSMENT — PAIN SCALES - GENERAL
PAINLEVEL_OUTOF10: 9
PAINLEVEL: 0-NO PAIN

## 2024-01-08 ASSESSMENT — PAIN - FUNCTIONAL ASSESSMENT: PAIN_FUNCTIONAL_ASSESSMENT: 0-10

## 2024-01-08 NOTE — PROGRESS NOTES
"Physical Therapy    Physical Therapy Treatment    Patient Name: Tana Hargrove  MRN: [id number]  : 1945   Today's Date: 2024     Visit #5   Insurance:          Current Problem  1. Chronic pain of right knee  Follow Up In Physical Therapy      2. Weakness generalized  Follow Up In Physical Therapy      3. Chronic pain of multiple joints  Follow Up In Physical Therapy            Subjective   Pt reports that she feels like she was hit by a truck,  very stiff and painful, has had multiple appts today  and another following this appt limiting her treatment time.  Pt rates pain at 9/10 all over upon arrival.  Pt states that she had an infusion the other day that is supposed to make her feel better but it didn't this time and her lab numbers went down instead of up.  Pt states taht she \"just feels bad\"        Precautions  Precautions  STEADI Fall Risk Score (The score of 4 or more indicates an increased risk of falling): 0       Pain  Pain Assessment: 0-10  Pain Score: 9    Objective      Vc's for walker placement and foot clearance      Treatments:                                         Date:  HD  HD                                            VISIT# #1 #2 #3 #4    EXERCISES REPS REPS REPS REPS            NuStep   L1 x 10' W/ MH L1 x 10 w/MH L1 x 10  L1 x 9' W/ MH           RB Calf Str  3 x 30\" 3 x 30\" 3 x 30\"    HS Str  3 x 30\" 3 x 30\"     Standing          Hip abduction          Hip Flexion  2x10              Ball Squeeze  2 x 10 x 5\" H  2 x 10 x 5\"    Hip Abd seated  2 x 10 x gtb  2 x 15 x gtb            Parallel Bars:        Lateral    2 laps 1 lap    Retro   L1 lap 2 laps    March    2 laps                    Cane - lunge over-back    X10 ea 1 UE                                                                                            HEP     X   80' x 2 with rollator     Assessment:  Limited treatment time due to pt report of pain at 9/10 upon arrival without improvement with MH and " small movements on NuStep and general complaint that she just feels bad and hurts all over.  Pt wanting to just come back another day       Plan:  Progress with strengthening and mobility as tolerated    OP EDUCATION:       Goals:  Active       Right Knee pain       PT Goal 1       Start:  11/14/23    Expected End:  12/08/23       Pt will demonstrate independence with HEP to reinforce gains made in treatment          PT Goal 2       Start:  11/14/23    Expected End:  12/08/23       Pt will report having a decrease in right knee pain to 6/10 at its worst for increased tolerance for closed chain activities          PT Goal 3       Start:  11/14/23    Expected End:  12/22/23       Pt will have an increase in right LE strength by 1/3 MMT grade to improve balance and mobility          PT Goal 4       Start:  11/14/23    Expected End:  12/08/23       Perform Gore Balance assessment              .

## 2024-01-08 NOTE — PROGRESS NOTES
Pt arrived awake, alert, oriented, no apparent distress with unlabored breaths. Pt denies any s/sx of illness today, yet reports that she has moderate fatigue. Pt was noted to have a BP of 163/65, in which Dr. Medina was notified. Dr. Medina verbalized to go ahead and treat pt today. Pt here for injection of Epoetin Fletcher, in which she received and tolerated well. Pt without further questions or concerns, observed ambulating freely, with use of a walker, to exit area for discharge home in stable condition.

## 2024-01-10 ENCOUNTER — DOCUMENTATION (OUTPATIENT)
Dept: PHYSICAL THERAPY | Facility: HOSPITAL | Age: 79
End: 2024-01-10
Payer: MEDICARE

## 2024-01-10 NOTE — PROGRESS NOTES
Physical Therapy                 Therapy Communication Note    Patient Name: Tana Hargrove  MRN: [id number]  Today's Date: 1/10/2024     Discipline: Physical Therapy    Missed Visit Reason:      Missed Time: No Show    Comment: 5th No Show, spoke with pt states she didn't know she had an appt today

## 2024-01-12 ENCOUNTER — TELEPHONE (OUTPATIENT)
Dept: PRIMARY CARE | Facility: CLINIC | Age: 79
End: 2024-01-12
Payer: MEDICARE

## 2024-01-15 ENCOUNTER — TELEMEDICINE (OUTPATIENT)
Dept: HEMATOLOGY/ONCOLOGY | Facility: CLINIC | Age: 79
End: 2024-01-15
Payer: MEDICARE

## 2024-01-15 ENCOUNTER — APPOINTMENT (OUTPATIENT)
Dept: LAB | Facility: HOSPITAL | Age: 79
End: 2024-01-15
Payer: MEDICARE

## 2024-01-15 ENCOUNTER — INFUSION (OUTPATIENT)
Dept: HEMATOLOGY/ONCOLOGY | Facility: CLINIC | Age: 79
End: 2024-01-15
Payer: MEDICARE

## 2024-01-15 VITALS
SYSTOLIC BLOOD PRESSURE: 150 MMHG | TEMPERATURE: 97.3 F | RESPIRATION RATE: 16 BRPM | OXYGEN SATURATION: 99 % | HEIGHT: 61 IN | HEART RATE: 71 BPM | DIASTOLIC BLOOD PRESSURE: 65 MMHG | BODY MASS INDEX: 31.75 KG/M2

## 2024-01-15 DIAGNOSIS — M79.18 MYOFASCIAL PAIN SYNDROME: ICD-10-CM

## 2024-01-15 DIAGNOSIS — D46.9 MYELODYSPLASTIC SYNDROME (MULTI): Primary | ICD-10-CM

## 2024-01-15 DIAGNOSIS — D46.9 MYELODYSPLASTIC SYNDROME (MULTI): ICD-10-CM

## 2024-01-15 LAB
BASOPHILS # BLD AUTO: 0.04 X10*3/UL (ref 0–0.1)
BASOPHILS NFR BLD AUTO: 0.5 %
DACRYOCYTES BLD QL SMEAR: NORMAL
EOSINOPHIL # BLD AUTO: 0.17 X10*3/UL (ref 0–0.4)
EOSINOPHIL NFR BLD AUTO: 2.2 %
ERYTHROCYTE [DISTWIDTH] IN BLOOD BY AUTOMATED COUNT: 23.9 % (ref 11.5–14.5)
HCT VFR BLD AUTO: 24.8 % (ref 36–46)
HGB BLD-MCNC: 7.9 G/DL (ref 12–16)
HYPOCHROMIA BLD QL SMEAR: NORMAL
IMM GRANULOCYTES # BLD AUTO: 0.02 X10*3/UL (ref 0–0.5)
IMM GRANULOCYTES NFR BLD AUTO: 0.3 % (ref 0–0.9)
LYMPHOCYTES # BLD AUTO: 1.47 X10*3/UL (ref 0.8–3)
LYMPHOCYTES NFR BLD AUTO: 19.2 %
MCH RBC QN AUTO: 31.6 PG (ref 26–34)
MCHC RBC AUTO-ENTMCNC: 31.9 G/DL (ref 32–36)
MCV RBC AUTO: 99 FL (ref 80–100)
MONOCYTES # BLD AUTO: 0.65 X10*3/UL (ref 0.05–0.8)
MONOCYTES NFR BLD AUTO: 8.5 %
NEUTROPHILS # BLD AUTO: 5.32 X10*3/UL (ref 1.6–5.5)
NEUTROPHILS NFR BLD AUTO: 69.3 %
NRBC BLD-RTO: ABNORMAL /100{WBCS}
OVALOCYTES BLD QL SMEAR: NORMAL
PLATELET # BLD AUTO: 318 X10*3/UL (ref 150–450)
RBC # BLD AUTO: 2.5 X10*6/UL (ref 4–5.2)
RBC MORPH BLD: NORMAL
WBC # BLD AUTO: 7.7 X10*3/UL (ref 4.4–11.3)

## 2024-01-15 PROCEDURE — 96372 THER/PROPH/DIAG INJ SC/IM: CPT | Performed by: NURSE PRACTITIONER

## 2024-01-15 PROCEDURE — 2500000004 HC RX 250 GENERAL PHARMACY W/ HCPCS (ALT 636 FOR OP/ED): Mod: JZ,JG,EC | Performed by: NURSE PRACTITIONER

## 2024-01-15 PROCEDURE — 96372 THER/PROPH/DIAG INJ SC/IM: CPT

## 2024-01-15 PROCEDURE — 36415 COLL VENOUS BLD VENIPUNCTURE: CPT

## 2024-01-15 PROCEDURE — 99214 OFFICE O/P EST MOD 30 MIN: CPT | Performed by: NURSE PRACTITIONER

## 2024-01-15 PROCEDURE — 85025 COMPLETE CBC W/AUTO DIFF WBC: CPT

## 2024-01-15 RX ORDER — FAMOTIDINE 10 MG/ML
20 INJECTION INTRAVENOUS ONCE AS NEEDED
Status: CANCELLED | OUTPATIENT
Start: 2024-01-16

## 2024-01-15 RX ORDER — DIPHENHYDRAMINE HYDROCHLORIDE 50 MG/ML
50 INJECTION INTRAMUSCULAR; INTRAVENOUS AS NEEDED
Status: CANCELLED | OUTPATIENT
Start: 2024-01-16

## 2024-01-15 RX ORDER — EPINEPHRINE 0.3 MG/.3ML
0.3 INJECTION SUBCUTANEOUS EVERY 5 MIN PRN
Status: CANCELLED | OUTPATIENT
Start: 2024-01-16

## 2024-01-15 RX ORDER — ALBUTEROL SULFATE 0.83 MG/ML
3 SOLUTION RESPIRATORY (INHALATION) AS NEEDED
Status: CANCELLED | OUTPATIENT
Start: 2024-01-16

## 2024-01-15 RX ORDER — PREGABALIN 100 MG/1
CAPSULE ORAL
Qty: 60 CAPSULE | Refills: 0 | Status: ON HOLD | OUTPATIENT
Start: 2024-01-15 | End: 2024-03-05 | Stop reason: SDUPTHER

## 2024-01-15 RX ADMIN — ERYTHROPOIETIN 80000 UNITS: 40000 INJECTION, SOLUTION INTRAVENOUS; SUBCUTANEOUS at 13:49

## 2024-01-15 ASSESSMENT — PAIN SCALES - GENERAL: PAINLEVEL: 0-NO PAIN

## 2024-01-15 NOTE — PATIENT INSTRUCTIONS
Virtual visit with DNP for MDS. Hgb today 7.9  Tana will continue  Weekly Procrit injections for now. Will have Tana return in 2mons, 3/11/24 to see Ann Marie in person and discuss other options

## 2024-01-15 NOTE — PROGRESS NOTES
Patient ID: Tana Hargrove is a 78 y.o. female.  Referring Physician: No referring provider defined for this encounter.  Primary Care Provider: Carlos Higgins MD  Visit Type:  Follow Up     Verbal consent was requested and obtained from patient on this date for a telehealth visit.    Subjective    Chief Complaint: Follow-up for MDS   Interval History:    Tana Hargrove presents today for interval follow-up and treatment of anemia secondary MDS and chronic disease. She has mild fatigue, BERNAL, and weakness, but is  stable.  She will be going to PT for strengthening.   She has been receiving Procrit 80,000 weekly and tolerating well.  She denies bleeding, nausea vomiting, chest pain, abdominal pain or other new symptoms.  She was admitted for 1 day in mid December  when hemoglobin dropped to 7.1.  She was transfused PRBCs.     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 She  has been maintained on weekly injections of Procrit, 80,000 units.. She has required transfusion intermittently. History also significant for diabetes, lumbar spinal stenosis and degenerative disc disease, hypertension, osteoporosis, GERD, hyperlipidemia,  benign breast disease, gout, hysterectomy, BSO, knee replacement surgeries, back surgery, arthritis, foot surgery, diverticulosis, colon polyp and skin cancers.  COVID-19 infection (11/20/20) .  COVID-19 vaccination (Moderna) February 2021.  [facility] spine  surgery March 2021.  Had Shingrix vaccine 9/2020.  Pneumovax 9/2020.  ESTEE, resolving.     Family medical history: Sister had colon cancer at age 50.        Review of Systems:   Review of Systems:    Constitutional: No fever, chills, night sweats.  Increased fatigue.    Head and neck: No headaches or dizziness.  No pain or stiffness   HEENT: No sore throat or sinusitis.  Hearing is normal and eyesight is good.  Cardiac: No chest pain or palpitations  Respiratory:  No increased dyspnea, cough, hemoptysis.  Mild BERNAL.  GI: Appetite is good and weight stable.  No constipation, diarrhea.  No abdominal pain, nausea or vomiting.  Genitourinary: No frequency or urgency.  No polyuria, dysuria.  Musculoskeletal: Chronic low back pain, knee pain, left foot pain.  Endocrine: History of diabetes; no thyroid disease.  Skin: No rash, skin lesions, itching.  Neuromuscular: no fainting or dizziness.  No history of seizure.  Occasional tremor, spasms and weakness in her extremities       Review of Systems - Oncology     Objective   BSA: There is no height or weight on file to calculate BSA.  There were no vitals taken for this visit.     has a past medical history of Abnormal levels of other serum enzymes, Acute kidney failure (CMS/HCC), Anemia, CKD (chronic kidney disease), COPD (chronic obstructive pulmonary disease) (CMS/HCC), Disease of blood and blood-forming organs, unspecified, HLD (hyperlipidemia), Personal history of other diseases of the musculoskeletal system and connective tissue, Personal history of other specified conditions, Personal history of other specified conditions, and Type 2 diabetes mellitus (CMS/HCC).   has a past surgical history that includes Other surgical history (12/13/2019); Other surgical history (12/13/2019); Other surgical history (12/13/2019); Other surgical history (12/13/2019); Other surgical history (12/13/2019); Other surgical history (12/13/2019); Other surgical history (12/13/2019); Other surgical history (04/16/2021); MR angio head wo IV contrast (11/20/2020); MR angio neck wo IV contrast (11/20/2020); Breast biopsy (Left); Hysterectomy; Breast lumpectomy; Appendectomy; Colonoscopy; and Oophorectomy.  No family history on file.  Oncology History    No history exists.       Tana NOEL Beena  reports that she has never smoked. She has never used smokeless tobacco.  She  reports that she does not currently use alcohol.  She  reports no history of drug  use.      WBC   Date/Time Value Ref Range Status   01/15/2024 01:17 PM 7.7 4.4 - 11.3 x10*3/uL Preliminary   01/08/2024 01:49 PM 5.7 4.4 - 11.3 x10*3/uL Final   01/02/2024 01:13 PM 5.7 4.4 - 11.3 x10*3/uL Final     nRBC   Date Value Ref Range Status   01/15/2024   Preliminary     Comment:     Not Measured   01/08/2024   Final     Comment:     Not Measured   01/02/2024   Final     Comment:     Not Measured     RBC   Date Value Ref Range Status   01/15/2024 2.50 (L) 4.00 - 5.20 x10*6/uL Preliminary   01/08/2024 2.69 (L) 4.00 - 5.20 x10*6/uL Final   01/02/2024 2.89 (L) 4.00 - 5.20 x10*6/uL Final     Hemoglobin   Date Value Ref Range Status   01/15/2024 7.9 (L) 12.0 - 16.0 g/dL Preliminary   01/08/2024 8.5 (L) 12.0 - 16.0 g/dL Final   01/02/2024 9.0 (L) 12.0 - 16.0 g/dL Final     Hematocrit   Date Value Ref Range Status   01/15/2024 24.8 (L) 36.0 - 46.0 % Preliminary   01/08/2024 26.8 (L) 36.0 - 46.0 % Final   01/02/2024 28.3 (L) 36.0 - 46.0 % Final     MCV   Date/Time Value Ref Range Status   01/15/2024 01:17 PM 99 80 - 100 fL Preliminary   01/08/2024 01:49  80 - 100 fL Final   01/02/2024 01:13 PM 98 80 - 100 fL Final     MCH   Date/Time Value Ref Range Status   01/15/2024 01:17 PM 31.6 26.0 - 34.0 pg Preliminary   01/08/2024 01:49 PM 31.6 26.0 - 34.0 pg Final   01/02/2024 01:13 PM 31.1 26.0 - 34.0 pg Final     MCHC   Date/Time Value Ref Range Status   01/15/2024 01:17 PM 31.9 (L) 32.0 - 36.0 g/dL Preliminary   01/08/2024 01:49 PM 31.7 (L) 32.0 - 36.0 g/dL Final   01/02/2024 01:13 PM 31.8 (L) 32.0 - 36.0 g/dL Final     RDW   Date/Time Value Ref Range Status   01/15/2024 01:17 PM 23.9 (H) 11.5 - 14.5 % Preliminary   01/08/2024 01:49 PM 24.0 (H) 11.5 - 14.5 % Final   01/02/2024 01:13 PM 24.0 (H) 11.5 - 14.5 % Final     Platelets   Date/Time Value Ref Range Status   01/15/2024 01:17  150 - 450 x10*3/uL Preliminary   01/08/2024 01:49  150 - 450 x10*3/uL Final   01/02/2024 01:13  150 - 450 x10*3/uL  Final     MPV   Date/Time Value Ref Range Status   10/30/2023 01:56 PM 11.3 7.5 - 11.5 fL Final   10/23/2023 02:42 PM 11.5 7.5 - 11.5 fL Final   10/16/2023 02:41 PM 11.4 7.5 - 11.5 fL Final     Neutrophils %   Date/Time Value Ref Range Status   01/08/2024 01:49 PM 66.9 40.0 - 80.0 % Final   01/02/2024 01:13 PM 59.5 40.0 - 80.0 % Final   12/26/2023 12:32 PM 62.4 40.0 - 80.0 % Final     Immature Granulocytes %, Automated   Date/Time Value Ref Range Status   01/08/2024 01:49 PM 0.2 0.0 - 0.9 % Final     Comment:     Immature Granulocyte Count (IG) includes promyelocytes, myelocytes and metamyelocytes but does not include bands. Percent differential counts (%) should be interpreted in the context of the absolute cell counts (cells/UL).   01/02/2024 01:13 PM 0.2 0.0 - 0.9 % Final     Comment:     Immature Granulocyte Count (IG) includes promyelocytes, myelocytes and metamyelocytes but does not include bands. Percent differential counts (%) should be interpreted in the context of the absolute cell counts (cells/UL).   12/26/2023 12:32 PM 0.4 0.0 - 0.9 % Final     Comment:     Immature Granulocyte Count (IG) includes promyelocytes, myelocytes and metamyelocytes but does not include bands. Percent differential counts (%) should be interpreted in the context of the absolute cell counts (cells/UL).     Lymphocytes %, Manual   Date/Time Value Ref Range Status   10/23/2023 02:42 PM 27.0 13.0 - 44.0 % Final     Lymphocytes %   Date/Time Value Ref Range Status   01/08/2024 01:49 PM 22.6 13.0 - 44.0 % Final   01/02/2024 01:13 PM 28.3 13.0 - 44.0 % Final   12/26/2023 12:32 PM 25.1 13.0 - 44.0 % Final     Monocytes %, Manual   Date/Time Value Ref Range Status   10/23/2023 02:42 PM 6.0 2.0 - 10.0 % Final     Monocytes %   Date/Time Value Ref Range Status   01/08/2024 01:49 PM 6.8 2.0 - 10.0 % Final   01/02/2024 01:13 PM 8.8 2.0 - 10.0 % Final   12/26/2023 12:32 PM 8.8 2.0 - 10.0 % Final     Eosinophils %, Manual   Date/Time Value Ref  Range Status   10/23/2023 02:42 PM 3.0 0.0 - 6.0 % Final     Eosinophils %   Date/Time Value Ref Range Status   01/08/2024 01:49 PM 2.6 0.0 - 6.0 % Final   01/02/2024 01:13 PM 2.3 0.0 - 6.0 % Final   12/26/2023 12:32 PM 2.4 0.0 - 6.0 % Final     Basophils %, Manual   Date/Time Value Ref Range Status   10/23/2023 02:42 PM 0.0 0.0 - 2.0 % Final     Basophils %   Date/Time Value Ref Range Status   01/08/2024 01:49 PM 0.9 0.0 - 2.0 % Final   01/02/2024 01:13 PM 0.9 0.0 - 2.0 % Final   12/26/2023 12:32 PM 0.9 0.0 - 2.0 % Final     Neutrophils Absolute   Date/Time Value Ref Range Status   01/08/2024 01:49 PM 3.83 1.60 - 5.50 x10*3/uL Final     Comment:     Percent differential counts (%) should be interpreted in the context of the absolute cell counts (cells/uL).   01/02/2024 01:13 PM 3.37 1.60 - 5.50 x10*3/uL Final     Comment:     Percent differential counts (%) should be interpreted in the context of the absolute cell counts (cells/uL).   12/26/2023 12:32 PM 2.92 1.60 - 5.50 x10*3/uL Final     Comment:     Percent differential counts (%) should be interpreted in the context of the absolute cell counts (cells/uL).     Immature Granulocytes Absolute, Automated   Date/Time Value Ref Range Status   01/08/2024 01:49 PM 0.01 0.00 - 0.50 x10*3/uL Final   01/02/2024 01:13 PM 0.01 0.00 - 0.50 x10*3/uL Final   12/26/2023 12:32 PM 0.02 0.00 - 0.50 x10*3/uL Final     Lymphocytes Absolute   Date/Time Value Ref Range Status   01/08/2024 01:49 PM 1.29 0.80 - 3.00 x10*3/uL Final   01/02/2024 01:13 PM 1.60 0.80 - 3.00 x10*3/uL Final   12/26/2023 12:32 PM 1.17 0.80 - 3.00 x10*3/uL Final     Monocytes Absolute   Date/Time Value Ref Range Status   01/08/2024 01:49 PM 0.39 0.05 - 0.80 x10*3/uL Final   01/02/2024 01:13 PM 0.50 0.05 - 0.80 x10*3/uL Final   12/26/2023 12:32 PM 0.41 0.05 - 0.80 x10*3/uL Final     Eosinophils Absolute   Date/Time Value Ref Range Status   01/08/2024 01:49 PM 0.15 0.00 - 0.40 x10*3/uL Final   01/02/2024 01:13 PM  "0.13 0.00 - 0.40 x10*3/uL Final   12/26/2023 12:32 PM 0.11 0.00 - 0.40 x10*3/uL Final     Eosinophils Absolute, Manual   Date/Time Value Ref Range Status   10/23/2023 02:42 PM 0.21 0.00 - 0.40 x10*3/uL Final     Basophils Absolute   Date/Time Value Ref Range Status   01/08/2024 01:49 PM 0.05 0.00 - 0.10 x10*3/uL Final   01/02/2024 01:13 PM 0.05 0.00 - 0.10 x10*3/uL Final   12/26/2023 12:32 PM 0.04 0.00 - 0.10 x10*3/uL Final     Basophils Absolute, Manual   Date/Time Value Ref Range Status   10/23/2023 02:42 PM 0.00 0.00 - 0.10 x10*3/uL Final       No components found for: \"PT\"  aPTT   Date/Time Value Ref Range Status   10/27/2020 05:14 AM 24 (L) 25 - 35 sec Final     Comment:       THE APTT IS NO LONGER USED FOR MONITORING     UNFRACTIONATED HEPARIN THERAPY.    FOR MONITORING HEPARIN THERAPY,     USE THE HEPARIN ASSAY.         Assessment/Plan    Assessment and Plan:   Assessment:    1. Anemia due to myelodysplastic syndrome, renal insufficiency and chronic disease, on Procrit.  She has required transfusions intermittently, especially when hemoglobin  < 7.5.  Hgb today is 7.7. patient is tired but states she doesn't think she needs a transfusion yet. She will continue the Procrit 80,000units weekly for now, but I don't think she is responding. She may be at the point of being transfusion dependent.  Ferritin is elevated most likely from frequent transfusions and inflammation. Not sure if she would benefit from chelating agents. We will discuss further at next visit     2.  History of hypertension, arthritis, skin cancer, diabetes and other medical problems     3.  Degenerative disease of the spine, knees, hips, etc.      4.  CKD - stage 4, on Procrit as stated above.        Plan:  She will continue weekly Procrit injections, 80,000 units. Continue supportive care with transfusions as needed. We will continue monitoring CBC weekly. She will have follow-up visit in 2 months. She will call office with any questions or " concerns  in the meantime. She will continue care with primary care physician for general medical care and with her other healthcare providers as directed.         Rosanne M Casal, APRN-CNP, DNP

## 2024-01-15 NOTE — PROGRESS NOTES
Pt here for procrit. Pt states she feels well today. Pt tolerated injection. She is aware of plan of care, will call with further questions or concerns. Will return in 1 week for labs and next injection.

## 2024-01-16 ENCOUNTER — APPOINTMENT (OUTPATIENT)
Dept: PHYSICAL THERAPY | Facility: HOSPITAL | Age: 79
End: 2024-01-16
Payer: MEDICARE

## 2024-01-18 DIAGNOSIS — D46.9 MYELODYSPLASTIC SYNDROME (MULTI): Primary | ICD-10-CM

## 2024-01-18 RX ORDER — EPINEPHRINE 0.3 MG/.3ML
0.3 INJECTION SUBCUTANEOUS EVERY 5 MIN PRN
Status: CANCELLED | OUTPATIENT
Start: 2024-01-22

## 2024-01-18 RX ORDER — DIPHENHYDRAMINE HYDROCHLORIDE 50 MG/ML
50 INJECTION INTRAMUSCULAR; INTRAVENOUS AS NEEDED
Status: CANCELLED | OUTPATIENT
Start: 2024-01-22

## 2024-01-18 RX ORDER — ALBUTEROL SULFATE 0.83 MG/ML
3 SOLUTION RESPIRATORY (INHALATION) AS NEEDED
Status: CANCELLED | OUTPATIENT
Start: 2024-01-22

## 2024-01-18 RX ORDER — FAMOTIDINE 10 MG/ML
20 INJECTION INTRAVENOUS ONCE AS NEEDED
Status: CANCELLED | OUTPATIENT
Start: 2024-01-22

## 2024-01-22 ENCOUNTER — INFUSION (OUTPATIENT)
Dept: HEMATOLOGY/ONCOLOGY | Facility: CLINIC | Age: 79
End: 2024-01-22
Payer: MEDICARE

## 2024-01-22 VITALS
HEIGHT: 61 IN | HEART RATE: 74 BPM | DIASTOLIC BLOOD PRESSURE: 69 MMHG | RESPIRATION RATE: 16 BRPM | SYSTOLIC BLOOD PRESSURE: 157 MMHG | BODY MASS INDEX: 31.75 KG/M2 | OXYGEN SATURATION: 98 %

## 2024-01-22 DIAGNOSIS — D46.9 MYELODYSPLASTIC SYNDROME (MULTI): ICD-10-CM

## 2024-01-22 LAB
ACANTHOCYTES BLD QL SMEAR: NORMAL
BASOPHILS # BLD AUTO: 0.08 X10*3/UL (ref 0–0.1)
BASOPHILS NFR BLD AUTO: 1.1 %
DACRYOCYTES BLD QL SMEAR: NORMAL
EOSINOPHIL # BLD AUTO: 0.19 X10*3/UL (ref 0–0.4)
EOSINOPHIL NFR BLD AUTO: 2.6 %
ERYTHROCYTE [DISTWIDTH] IN BLOOD BY AUTOMATED COUNT: 24.6 % (ref 11.5–14.5)
HCT VFR BLD AUTO: 24 % (ref 36–46)
HGB BLD-MCNC: 7.6 G/DL (ref 12–16)
HYPOCHROMIA BLD QL SMEAR: NORMAL
IMM GRANULOCYTES # BLD AUTO: 0.02 X10*3/UL (ref 0–0.5)
IMM GRANULOCYTES NFR BLD AUTO: 0.3 % (ref 0–0.9)
LYMPHOCYTES # BLD AUTO: 1.45 X10*3/UL (ref 0.8–3)
LYMPHOCYTES NFR BLD AUTO: 19.5 %
MCH RBC QN AUTO: 31.9 PG (ref 26–34)
MCHC RBC AUTO-ENTMCNC: 31.7 G/DL (ref 32–36)
MCV RBC AUTO: 101 FL (ref 80–100)
MONOCYTES # BLD AUTO: 0.55 X10*3/UL (ref 0.05–0.8)
MONOCYTES NFR BLD AUTO: 7.4 %
NEUTROPHILS # BLD AUTO: 5.16 X10*3/UL (ref 1.6–5.5)
NEUTROPHILS NFR BLD AUTO: 69.1 %
NRBC BLD-RTO: ABNORMAL /100{WBCS}
OVALOCYTES BLD QL SMEAR: NORMAL
PLATELET # BLD AUTO: 322 X10*3/UL (ref 150–450)
RBC # BLD AUTO: 2.38 X10*6/UL (ref 4–5.2)
RBC MORPH BLD: NORMAL
TARGETS BLD QL SMEAR: NORMAL
WBC # BLD AUTO: 7.5 X10*3/UL (ref 4.4–11.3)

## 2024-01-22 PROCEDURE — 85025 COMPLETE CBC W/AUTO DIFF WBC: CPT

## 2024-01-22 PROCEDURE — 96372 THER/PROPH/DIAG INJ SC/IM: CPT

## 2024-01-22 PROCEDURE — 36415 COLL VENOUS BLD VENIPUNCTURE: CPT

## 2024-01-22 PROCEDURE — 2500000004 HC RX 250 GENERAL PHARMACY W/ HCPCS (ALT 636 FOR OP/ED): Mod: JZ,JG,EC | Performed by: NURSE PRACTITIONER

## 2024-01-22 PROCEDURE — 96372 THER/PROPH/DIAG INJ SC/IM: CPT | Performed by: NURSE PRACTITIONER

## 2024-01-22 RX ORDER — EPINEPHRINE 0.3 MG/.3ML
0.3 INJECTION SUBCUTANEOUS EVERY 5 MIN PRN
Status: DISCONTINUED | OUTPATIENT
Start: 2024-01-22 | End: 2024-01-22 | Stop reason: HOSPADM

## 2024-01-22 RX ORDER — DIPHENHYDRAMINE HYDROCHLORIDE 50 MG/ML
50 INJECTION INTRAMUSCULAR; INTRAVENOUS AS NEEDED
Status: DISCONTINUED | OUTPATIENT
Start: 2024-01-22 | End: 2024-01-22 | Stop reason: HOSPADM

## 2024-01-22 RX ORDER — EPINEPHRINE 0.3 MG/.3ML
0.3 INJECTION SUBCUTANEOUS EVERY 5 MIN PRN
Status: CANCELLED | OUTPATIENT
Start: 2024-01-29

## 2024-01-22 RX ORDER — FAMOTIDINE 10 MG/ML
20 INJECTION INTRAVENOUS ONCE AS NEEDED
Status: DISCONTINUED | OUTPATIENT
Start: 2024-01-22 | End: 2024-01-22 | Stop reason: HOSPADM

## 2024-01-22 RX ORDER — DIPHENHYDRAMINE HYDROCHLORIDE 50 MG/ML
50 INJECTION INTRAMUSCULAR; INTRAVENOUS AS NEEDED
Status: CANCELLED | OUTPATIENT
Start: 2024-01-29

## 2024-01-22 RX ORDER — ALBUTEROL SULFATE 0.83 MG/ML
3 SOLUTION RESPIRATORY (INHALATION) AS NEEDED
Status: DISCONTINUED | OUTPATIENT
Start: 2024-01-22 | End: 2024-01-22 | Stop reason: HOSPADM

## 2024-01-22 RX ORDER — ALBUTEROL SULFATE 0.83 MG/ML
3 SOLUTION RESPIRATORY (INHALATION) AS NEEDED
Status: CANCELLED | OUTPATIENT
Start: 2024-01-29

## 2024-01-22 RX ORDER — FAMOTIDINE 10 MG/ML
20 INJECTION INTRAVENOUS ONCE AS NEEDED
Status: CANCELLED | OUTPATIENT
Start: 2024-01-29

## 2024-01-22 RX ADMIN — ERYTHROPOIETIN 80000 UNITS: 40000 INJECTION, SOLUTION INTRAVENOUS; SUBCUTANEOUS at 11:15

## 2024-01-22 ASSESSMENT — PAIN SCALES - GENERAL: PAINLEVEL: 0-NO PAIN

## 2024-01-22 NOTE — PROGRESS NOTES
Pt here for procrit injection. Pt tolerated injection well. She is aware of plan of care, will call with further questions or concerns. Will return in 1 week for next inj and labs

## 2024-01-29 ENCOUNTER — INFUSION (OUTPATIENT)
Dept: HEMATOLOGY/ONCOLOGY | Facility: CLINIC | Age: 79
End: 2024-01-29
Payer: MEDICARE

## 2024-01-29 VITALS
DIASTOLIC BLOOD PRESSURE: 70 MMHG | OXYGEN SATURATION: 97 % | SYSTOLIC BLOOD PRESSURE: 159 MMHG | HEART RATE: 76 BPM | RESPIRATION RATE: 16 BRPM

## 2024-01-29 DIAGNOSIS — D46.9 MYELODYSPLASTIC SYNDROME (MULTI): ICD-10-CM

## 2024-01-29 LAB
BASOPHILS # BLD AUTO: 0.08 X10*3/UL (ref 0–0.1)
BASOPHILS NFR BLD AUTO: 1.1 %
DACRYOCYTES BLD QL SMEAR: NORMAL
EOSINOPHIL # BLD AUTO: 0.17 X10*3/UL (ref 0–0.4)
EOSINOPHIL NFR BLD AUTO: 2.3 %
ERYTHROCYTE [DISTWIDTH] IN BLOOD BY AUTOMATED COUNT: 25.4 % (ref 11.5–14.5)
HCT VFR BLD AUTO: 23.9 % (ref 36–46)
HGB BLD-MCNC: 7.5 G/DL (ref 12–16)
HYPOCHROMIA BLD QL SMEAR: NORMAL
IMM GRANULOCYTES # BLD AUTO: 0.02 X10*3/UL (ref 0–0.5)
IMM GRANULOCYTES NFR BLD AUTO: 0.3 % (ref 0–0.9)
LYMPHOCYTES # BLD AUTO: 1.41 X10*3/UL (ref 0.8–3)
LYMPHOCYTES NFR BLD AUTO: 18.9 %
MCH RBC QN AUTO: 31.3 PG (ref 26–34)
MCHC RBC AUTO-ENTMCNC: 31.4 G/DL (ref 32–36)
MCV RBC AUTO: 100 FL (ref 80–100)
MONOCYTES # BLD AUTO: 0.59 X10*3/UL (ref 0.05–0.8)
MONOCYTES NFR BLD AUTO: 7.9 %
NEUTROPHILS # BLD AUTO: 5.18 X10*3/UL (ref 1.6–5.5)
NEUTROPHILS NFR BLD AUTO: 69.5 %
NRBC BLD-RTO: ABNORMAL /100{WBCS}
OVALOCYTES BLD QL SMEAR: NORMAL
PLATELET # BLD AUTO: 308 X10*3/UL (ref 150–450)
RBC # BLD AUTO: 2.4 X10*6/UL (ref 4–5.2)
RBC MORPH BLD: NORMAL
WBC # BLD AUTO: 7.5 X10*3/UL (ref 4.4–11.3)

## 2024-01-29 PROCEDURE — 2500000004 HC RX 250 GENERAL PHARMACY W/ HCPCS (ALT 636 FOR OP/ED): Mod: JZ,JG,EC | Performed by: NURSE PRACTITIONER

## 2024-01-29 PROCEDURE — 96372 THER/PROPH/DIAG INJ SC/IM: CPT

## 2024-01-29 PROCEDURE — 36415 COLL VENOUS BLD VENIPUNCTURE: CPT

## 2024-01-29 PROCEDURE — 96372 THER/PROPH/DIAG INJ SC/IM: CPT | Performed by: NURSE PRACTITIONER

## 2024-01-29 PROCEDURE — 85025 COMPLETE CBC W/AUTO DIFF WBC: CPT

## 2024-01-29 RX ORDER — FAMOTIDINE 10 MG/ML
20 INJECTION INTRAVENOUS ONCE AS NEEDED
Status: CANCELLED | OUTPATIENT
Start: 2024-02-05

## 2024-01-29 RX ORDER — EPINEPHRINE 0.3 MG/.3ML
0.3 INJECTION SUBCUTANEOUS EVERY 5 MIN PRN
Status: CANCELLED | OUTPATIENT
Start: 2024-02-05

## 2024-01-29 RX ORDER — ALBUTEROL SULFATE 0.83 MG/ML
3 SOLUTION RESPIRATORY (INHALATION) AS NEEDED
Status: CANCELLED | OUTPATIENT
Start: 2024-02-05

## 2024-01-29 RX ORDER — DIPHENHYDRAMINE HYDROCHLORIDE 50 MG/ML
50 INJECTION INTRAMUSCULAR; INTRAVENOUS AS NEEDED
Status: CANCELLED | OUTPATIENT
Start: 2024-02-05

## 2024-01-29 RX ADMIN — ERYTHROPOIETIN 80000 UNITS: 40000 INJECTION, SOLUTION INTRAVENOUS; SUBCUTANEOUS at 12:36

## 2024-01-29 ASSESSMENT — PAIN SCALES - GENERAL: PAINLEVEL: 0-NO PAIN

## 2024-01-29 NOTE — PROGRESS NOTES
Patient presents for treatment,  has no complaints alert and oriented x 4. Patient meets parameters for treatment. Patient tolerated treatment well, no reaction noted. After treatment,  Pt tolerated procedure well. Patient feels well and has no complaints, vital signs stable.  Patient verbalized understanding of all teaching and education. Patient discharged in stable condition with no further needs.

## 2024-01-30 ENCOUNTER — APPOINTMENT (OUTPATIENT)
Dept: HEMATOLOGY/ONCOLOGY | Facility: CLINIC | Age: 79
End: 2024-01-30
Payer: MEDICARE

## 2024-01-31 ENCOUNTER — DOCUMENTATION (OUTPATIENT)
Dept: PHYSICAL THERAPY | Facility: HOSPITAL | Age: 79
End: 2024-01-31

## 2024-01-31 NOTE — PROGRESS NOTES
Physical Therapy                 Therapy Communication Note    Patient Name: Tana Hagrrove  MRN: [id number]  Today's Date: 1/31/2024     Discipline: Physical Therapy    Missed Visit Reason:      Missed Time: No Show    Comment:

## 2024-01-31 NOTE — PROGRESS NOTES
Physical Therapy    Discharge Summary    Name: Tana Hargrove  MRN: [id number]  : 1945  Date: 24    Discharge Summary: PT    Discharge Information: Date of discharge 24, Date of last visit 24, Date of evaluation 23, and Number of attended visits 5      Rehab Discharge Reason: Failed to schedule and/or keep follow-up appointment(s)  5 NS and 2 Cx's

## 2024-02-05 ENCOUNTER — APPOINTMENT (OUTPATIENT)
Dept: HEMATOLOGY/ONCOLOGY | Facility: CLINIC | Age: 79
End: 2024-02-05
Payer: MEDICARE

## 2024-02-06 ENCOUNTER — APPOINTMENT (OUTPATIENT)
Dept: HEMATOLOGY/ONCOLOGY | Facility: CLINIC | Age: 79
End: 2024-02-06
Payer: MEDICARE

## 2024-02-06 ENCOUNTER — TELEPHONE (OUTPATIENT)
Dept: HEMATOLOGY/ONCOLOGY | Facility: CLINIC | Age: 79
End: 2024-02-06
Payer: MEDICARE

## 2024-02-06 ENCOUNTER — INFUSION (OUTPATIENT)
Dept: HEMATOLOGY/ONCOLOGY | Facility: CLINIC | Age: 79
End: 2024-02-06
Payer: MEDICARE

## 2024-02-06 VITALS
SYSTOLIC BLOOD PRESSURE: 145 MMHG | OXYGEN SATURATION: 100 % | HEIGHT: 61 IN | DIASTOLIC BLOOD PRESSURE: 68 MMHG | RESPIRATION RATE: 16 BRPM | BODY MASS INDEX: 31.75 KG/M2 | HEART RATE: 79 BPM | TEMPERATURE: 97.2 F

## 2024-02-06 DIAGNOSIS — D46.9 MYELODYSPLASTIC SYNDROME (MULTI): ICD-10-CM

## 2024-02-06 DIAGNOSIS — D50.8 OTHER IRON DEFICIENCY ANEMIA: Primary | ICD-10-CM

## 2024-02-06 DIAGNOSIS — D53.1 OTHER MEGALOBLASTIC ANEMIA: Primary | ICD-10-CM

## 2024-02-06 LAB
ABO GROUP (TYPE) IN BLOOD: NORMAL
ANTIBODY SCREEN: NORMAL
BASO STIPL BLD QL SMEAR: PRESENT
BASOPHILS # BLD AUTO: 0.06 X10*3/UL (ref 0–0.1)
BASOPHILS NFR BLD AUTO: 1 %
DACRYOCYTES BLD QL SMEAR: NORMAL
EOSINOPHIL # BLD AUTO: 0.25 X10*3/UL (ref 0–0.4)
EOSINOPHIL NFR BLD AUTO: 4 %
ERYTHROCYTE [DISTWIDTH] IN BLOOD BY AUTOMATED COUNT: 25.5 % (ref 11.5–14.5)
HCT VFR BLD AUTO: 22.8 % (ref 36–46)
HGB BLD-MCNC: 7 G/DL (ref 12–16)
HYPOCHROMIA BLD QL SMEAR: NORMAL
IMM GRANULOCYTES # BLD AUTO: 0.01 X10*3/UL (ref 0–0.5)
IMM GRANULOCYTES NFR BLD AUTO: 0.2 % (ref 0–0.9)
LYMPHOCYTES # BLD AUTO: 1.38 X10*3/UL (ref 0.8–3)
LYMPHOCYTES NFR BLD AUTO: 22.2 %
MCH RBC QN AUTO: 30.7 PG (ref 26–34)
MCHC RBC AUTO-ENTMCNC: 30.7 G/DL (ref 32–36)
MCV RBC AUTO: 100 FL (ref 80–100)
MONOCYTES # BLD AUTO: 0.59 X10*3/UL (ref 0.05–0.8)
MONOCYTES NFR BLD AUTO: 9.5 %
NEUTROPHILS # BLD AUTO: 3.93 X10*3/UL (ref 1.6–5.5)
NEUTROPHILS NFR BLD AUTO: 63.1 %
NRBC BLD-RTO: ABNORMAL /100{WBCS}
OVALOCYTES BLD QL SMEAR: NORMAL
PLATELET # BLD AUTO: 366 X10*3/UL (ref 150–450)
POLYCHROMASIA BLD QL SMEAR: NORMAL
RBC # BLD AUTO: 2.28 X10*6/UL (ref 4–5.2)
RBC MORPH BLD: NORMAL
RH FACTOR (ANTIGEN D): NORMAL
WBC # BLD AUTO: 6.2 X10*3/UL (ref 4.4–11.3)

## 2024-02-06 PROCEDURE — 36415 COLL VENOUS BLD VENIPUNCTURE: CPT

## 2024-02-06 PROCEDURE — 2500000004 HC RX 250 GENERAL PHARMACY W/ HCPCS (ALT 636 FOR OP/ED): Mod: JZ,JG | Performed by: NURSE PRACTITIONER

## 2024-02-06 PROCEDURE — 86901 BLOOD TYPING SEROLOGIC RH(D): CPT | Performed by: NURSE PRACTITIONER

## 2024-02-06 PROCEDURE — 96372 THER/PROPH/DIAG INJ SC/IM: CPT

## 2024-02-06 PROCEDURE — 86902 BLOOD TYPE ANTIGEN DONOR EA: CPT

## 2024-02-06 PROCEDURE — 86922 COMPATIBILITY TEST ANTIGLOB: CPT

## 2024-02-06 PROCEDURE — 85025 COMPLETE CBC W/AUTO DIFF WBC: CPT

## 2024-02-06 PROCEDURE — 96372 THER/PROPH/DIAG INJ SC/IM: CPT | Performed by: NURSE PRACTITIONER

## 2024-02-06 RX ORDER — ALBUTEROL SULFATE 0.83 MG/ML
3 SOLUTION RESPIRATORY (INHALATION) AS NEEDED
Status: CANCELLED | OUTPATIENT
Start: 2024-02-12

## 2024-02-06 RX ORDER — EPINEPHRINE 0.3 MG/.3ML
0.3 INJECTION SUBCUTANEOUS EVERY 5 MIN PRN
Status: CANCELLED | OUTPATIENT
Start: 2024-02-07

## 2024-02-06 RX ORDER — DIPHENHYDRAMINE HYDROCHLORIDE 50 MG/ML
50 INJECTION INTRAMUSCULAR; INTRAVENOUS AS NEEDED
Status: DISCONTINUED | OUTPATIENT
Start: 2024-02-06 | End: 2024-02-06 | Stop reason: HOSPADM

## 2024-02-06 RX ORDER — EPINEPHRINE 0.3 MG/.3ML
0.3 INJECTION SUBCUTANEOUS EVERY 5 MIN PRN
Status: CANCELLED | OUTPATIENT
Start: 2024-02-12

## 2024-02-06 RX ORDER — ALBUTEROL SULFATE 0.83 MG/ML
3 SOLUTION RESPIRATORY (INHALATION) AS NEEDED
Status: CANCELLED | OUTPATIENT
Start: 2024-02-07

## 2024-02-06 RX ORDER — FAMOTIDINE 10 MG/ML
20 INJECTION INTRAVENOUS ONCE AS NEEDED
Status: CANCELLED | OUTPATIENT
Start: 2024-02-07

## 2024-02-06 RX ORDER — DIPHENHYDRAMINE HYDROCHLORIDE 50 MG/ML
50 INJECTION INTRAMUSCULAR; INTRAVENOUS AS NEEDED
Status: CANCELLED | OUTPATIENT
Start: 2024-02-12

## 2024-02-06 RX ORDER — FAMOTIDINE 10 MG/ML
20 INJECTION INTRAVENOUS ONCE AS NEEDED
Status: DISCONTINUED | OUTPATIENT
Start: 2024-02-06 | End: 2024-02-06 | Stop reason: HOSPADM

## 2024-02-06 RX ORDER — EPINEPHRINE 0.3 MG/.3ML
0.3 INJECTION SUBCUTANEOUS EVERY 5 MIN PRN
Status: DISCONTINUED | OUTPATIENT
Start: 2024-02-06 | End: 2024-02-06 | Stop reason: HOSPADM

## 2024-02-06 RX ORDER — FAMOTIDINE 10 MG/ML
20 INJECTION INTRAVENOUS ONCE AS NEEDED
Status: CANCELLED | OUTPATIENT
Start: 2024-02-12

## 2024-02-06 RX ORDER — DIPHENHYDRAMINE HYDROCHLORIDE 50 MG/ML
50 INJECTION INTRAMUSCULAR; INTRAVENOUS AS NEEDED
Status: CANCELLED | OUTPATIENT
Start: 2024-02-07

## 2024-02-06 RX ORDER — ALBUTEROL SULFATE 0.83 MG/ML
3 SOLUTION RESPIRATORY (INHALATION) AS NEEDED
Status: DISCONTINUED | OUTPATIENT
Start: 2024-02-06 | End: 2024-02-06 | Stop reason: HOSPADM

## 2024-02-06 RX ADMIN — ERYTHROPOIETIN 80000 UNITS: 40000 INJECTION, SOLUTION INTRAVENOUS; SUBCUTANEOUS at 12:15

## 2024-02-06 ASSESSMENT — PAIN SCALES - GENERAL: PAINLEVEL: 0-NO PAIN

## 2024-02-06 NOTE — PROGRESS NOTES
Pt her for retacrit injection. APRN R. Casal notified of hgl 7.0 and symptomatic. Orders placed and appointment arranged for blood transfusion. Pt tolerated injection today. She is aware of plan of care, will call with further questions or concerns. Will return in 1 weeks for labs and next injection

## 2024-02-06 NOTE — TELEPHONE ENCOUNTER
Spoke with Tana who stated she was feeling poorly, dizzy, dyspneic and fatigued.  Hgb-7.0 today  [facility] infusion can transfuse 1unit PRBC on 2/7 at 0900  Orders entered by DNP, Tana notified of date and time

## 2024-02-07 ENCOUNTER — HOSPITAL ENCOUNTER (INPATIENT)
Facility: HOSPITAL | Age: 79
LOS: 3 days | Discharge: HOME | DRG: 683 | End: 2024-02-11
Attending: EMERGENCY MEDICINE | Admitting: INTERNAL MEDICINE
Payer: MEDICARE

## 2024-02-07 ENCOUNTER — INFUSION (OUTPATIENT)
Dept: INFUSION THERAPY | Facility: HOSPITAL | Age: 79
DRG: 683 | End: 2024-02-07
Payer: MEDICARE

## 2024-02-07 ENCOUNTER — APPOINTMENT (OUTPATIENT)
Dept: CARDIOLOGY | Facility: HOSPITAL | Age: 79
DRG: 683 | End: 2024-02-07
Payer: MEDICARE

## 2024-02-07 VITALS
RESPIRATION RATE: 20 BRPM | OXYGEN SATURATION: 94 % | DIASTOLIC BLOOD PRESSURE: 67 MMHG | SYSTOLIC BLOOD PRESSURE: 143 MMHG | HEART RATE: 90 BPM | TEMPERATURE: 98.5 F

## 2024-02-07 DIAGNOSIS — D53.1 OTHER MEGALOBLASTIC ANEMIA: ICD-10-CM

## 2024-02-07 DIAGNOSIS — I48.0 PAROXYSMAL ATRIAL FIBRILLATION (MULTI): ICD-10-CM

## 2024-02-07 DIAGNOSIS — R26.2 INABILITY TO AMBULATE DUE TO MULTIPLE JOINTS: ICD-10-CM

## 2024-02-07 DIAGNOSIS — N17.9 ACUTE RENAL FAILURE SUPERIMPOSED ON CHRONIC KIDNEY DISEASE, UNSPECIFIED ACUTE RENAL FAILURE TYPE, UNSPECIFIED CKD STAGE (CMS-HCC): Primary | ICD-10-CM

## 2024-02-07 DIAGNOSIS — D64.9 ANEMIA, UNSPECIFIED TYPE: ICD-10-CM

## 2024-02-07 DIAGNOSIS — D50.8 OTHER IRON DEFICIENCY ANEMIA: ICD-10-CM
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
## 2024-02-07 DIAGNOSIS — D46.9 MYELODYSPLASTIC SYNDROME (MULTI): ICD-10-CM

## 2024-02-07 DIAGNOSIS — N18.9 ACUTE RENAL FAILURE SUPERIMPOSED ON CHRONIC KIDNEY DISEASE, UNSPECIFIED ACUTE RENAL FAILURE TYPE, UNSPECIFIED CKD STAGE (CMS-HCC): Primary | ICD-10-CM

## 2024-02-07 DIAGNOSIS — E87.5 HYPERKALEMIA: ICD-10-CM

## 2024-02-07 DIAGNOSIS — I10 HYPERTENSION, ESSENTIAL: ICD-10-CM

## 2024-02-07 LAB
BLOOD EXPIRATION DATE: NORMAL
DISPENSE STATUS: NORMAL
PRODUCT BLOOD TYPE: 600
PRODUCT CODE: NORMAL
UNIT ABO: NORMAL
UNIT NUMBER: NORMAL
UNIT RH: NORMAL
UNIT VOLUME: 279
XM INTEP: NORMAL

## 2024-02-07 PROCEDURE — P9040 RBC LEUKOREDUCED IRRADIATED: HCPCS

## 2024-02-07 PROCEDURE — 99285 EMERGENCY DEPT VISIT HI MDM: CPT | Mod: 25 | Performed by: EMERGENCY MEDICINE

## 2024-02-07 PROCEDURE — 85025 COMPLETE CBC W/AUTO DIFF WBC: CPT | Performed by: EMERGENCY MEDICINE

## 2024-02-07 PROCEDURE — 83880 ASSAY OF NATRIURETIC PEPTIDE: CPT | Performed by: EMERGENCY MEDICINE

## 2024-02-07 PROCEDURE — 93005 ELECTROCARDIOGRAM TRACING: CPT

## 2024-02-07 PROCEDURE — 30233N1 TRANSFUSION OF NONAUTOLOGOUS RED BLOOD CELLS INTO PERIPHERAL VEIN, PERCUTANEOUS APPROACH: ICD-10-PCS | Performed by: INTERNAL MEDICINE

## 2024-02-07 PROCEDURE — 96365 THER/PROPH/DIAG IV INF INIT: CPT

## 2024-02-07 PROCEDURE — 36430 TRANSFUSION BLD/BLD COMPNT: CPT

## 2024-02-07 PROCEDURE — 96375 TX/PRO/DX INJ NEW DRUG ADDON: CPT

## 2024-02-07 PROCEDURE — 80053 COMPREHEN METABOLIC PANEL: CPT | Performed by: EMERGENCY MEDICINE

## 2024-02-07 PROCEDURE — 36415 COLL VENOUS BLD VENIPUNCTURE: CPT | Performed by: EMERGENCY MEDICINE

## 2024-02-07 PROCEDURE — 84484 ASSAY OF TROPONIN QUANT: CPT | Performed by: EMERGENCY MEDICINE

## 2024-02-07 PROCEDURE — 87040 BLOOD CULTURE FOR BACTERIA: CPT | Mod: PORLAB | Performed by: EMERGENCY MEDICINE

## 2024-02-07 RX ORDER — HEPARIN SODIUM,PORCINE/PF 10 UNIT/ML
50 SYRINGE (ML) INTRAVENOUS AS NEEDED
OUTPATIENT
Start: 2024-02-07

## 2024-02-07 RX ORDER — EPINEPHRINE 0.3 MG/.3ML
0.3 INJECTION SUBCUTANEOUS EVERY 5 MIN PRN
OUTPATIENT
Start: 2024-02-07

## 2024-02-07 RX ORDER — DIPHENHYDRAMINE HYDROCHLORIDE 50 MG/ML
50 INJECTION INTRAMUSCULAR; INTRAVENOUS AS NEEDED
OUTPATIENT
Start: 2024-02-07

## 2024-02-07 RX ORDER — HEPARIN 100 UNIT/ML
500 SYRINGE INTRAVENOUS AS NEEDED
OUTPATIENT
Start: 2024-02-07

## 2024-02-07 RX ORDER — ALBUTEROL SULFATE 0.83 MG/ML
3 SOLUTION RESPIRATORY (INHALATION) AS NEEDED
OUTPATIENT
Start: 2024-02-07

## 2024-02-07 RX ORDER — FAMOTIDINE 10 MG/ML
20 INJECTION INTRAVENOUS ONCE AS NEEDED
OUTPATIENT
Start: 2024-02-07

## 2024-02-07 ASSESSMENT — PATIENT HEALTH QUESTIONNAIRE - PHQ9
1. LITTLE INTEREST OR PLEASURE IN DOING THINGS: NOT AT ALL
2. FEELING DOWN, DEPRESSED OR HOPELESS: NOT AT ALL
SUM OF ALL RESPONSES TO PHQ9 QUESTIONS 1 AND 2: 0

## 2024-02-07 ASSESSMENT — PAIN SCALES - GENERAL: PAINLEVEL: 0-NO PAIN

## 2024-02-07 ASSESSMENT — COLUMBIA-SUICIDE SEVERITY RATING SCALE - C-SSRS
6. HAVE YOU EVER DONE ANYTHING, STARTED TO DO ANYTHING, OR PREPARED TO DO ANYTHING TO END YOUR LIFE?: NO
2. HAVE YOU ACTUALLY HAD ANY THOUGHTS OF KILLING YOURSELF?: NO
1. IN THE PAST MONTH, HAVE YOU WISHED YOU WERE DEAD OR WISHED YOU COULD GO TO SLEEP AND NOT WAKE UP?: NO
1. IN THE PAST MONTH, HAVE YOU WISHED YOU WERE DEAD OR WISHED YOU COULD GO TO SLEEP AND NOT WAKE UP?: NO
6. HAVE YOU EVER DONE ANYTHING, STARTED TO DO ANYTHING, OR PREPARED TO DO ANYTHING TO END YOUR LIFE?: NO
2. HAVE YOU ACTUALLY HAD ANY THOUGHTS OF KILLING YOURSELF?: NO

## 2024-02-07 ASSESSMENT — ENCOUNTER SYMPTOMS
LOSS OF SENSATION IN FEET: 0
OCCASIONAL FEELINGS OF UNSTEADINESS: 1
DEPRESSION: 0

## 2024-02-08 ENCOUNTER — APPOINTMENT (OUTPATIENT)
Dept: RADIOLOGY | Facility: HOSPITAL | Age: 79
DRG: 683 | End: 2024-02-08
Payer: MEDICARE

## 2024-02-08 PROBLEM — N17.9 ACUTE RENAL FAILURE SUPERIMPOSED ON CHRONIC KIDNEY DISEASE, UNSPECIFIED ACUTE RENAL FAILURE TYPE, UNSPECIFIED CKD STAGE (CMS-HCC): Status: ACTIVE | Noted: 2024-02-08

## 2024-02-08 PROBLEM — N18.9 ACUTE RENAL FAILURE SUPERIMPOSED ON CHRONIC KIDNEY DISEASE, UNSPECIFIED ACUTE RENAL FAILURE TYPE, UNSPECIFIED CKD STAGE (CMS-HCC): Status: ACTIVE | Noted: 2024-02-08

## 2024-02-08 LAB
ALBUMIN SERPL BCP-MCNC: 3.7 G/DL (ref 3.4–5)
ALP SERPL-CCNC: 63 U/L (ref 33–136)
ALT SERPL W P-5'-P-CCNC: 9 U/L (ref 7–45)
ANION GAP BLDV CALCULATED.4IONS-SCNC: 10 MMOL/L (ref 10–25)
ANION GAP SERPL CALC-SCNC: 10 MMOL/L (ref 10–20)
ANION GAP SERPL CALC-SCNC: 13 MMOL/L (ref 10–20)
ANION GAP SERPL CALC-SCNC: 13 MMOL/L (ref 10–20)
ANION GAP SERPL CALC-SCNC: 8 MMOL/L (ref 10–20)
APPEARANCE UR: CLEAR
AST SERPL W P-5'-P-CCNC: 13 U/L (ref 9–39)
BACTERIA #/AREA URNS AUTO: ABNORMAL /HPF
BASE EXCESS BLDV CALC-SCNC: -6.3 MMOL/L (ref -2–3)
BASO STIPL BLD QL SMEAR: PRESENT
BASOPHILS # BLD AUTO: 0.03 X10*3/UL (ref 0–0.1)
BASOPHILS NFR BLD AUTO: 0.3 %
BILIRUB SERPL-MCNC: 0.4 MG/DL (ref 0–1.2)
BILIRUB UR STRIP.AUTO-MCNC: NEGATIVE MG/DL
BNP SERPL-MCNC: 128 PG/ML (ref 0–99)
BODY TEMPERATURE: 37 DEGREES CELSIUS
BUN SERPL-MCNC: 59 MG/DL (ref 6–23)
BUN SERPL-MCNC: 64 MG/DL (ref 6–23)
BUN SERPL-MCNC: 65 MG/DL (ref 6–23)
BUN SERPL-MCNC: 68 MG/DL (ref 6–23)
CA-I BLDV-SCNC: 1.41 MMOL/L (ref 1.1–1.33)
CALCIUM SERPL-MCNC: 9.1 MG/DL (ref 8.6–10.3)
CALCIUM SERPL-MCNC: 9.4 MG/DL (ref 8.6–10.3)
CALCIUM SERPL-MCNC: 9.4 MG/DL (ref 8.6–10.3)
CALCIUM SERPL-MCNC: 9.5 MG/DL (ref 8.6–10.3)
CARDIAC TROPONIN I PNL SERPL HS: 12 NG/L (ref 0–13)
CHLORIDE BLDV-SCNC: 114 MMOL/L (ref 98–107)
CHLORIDE SERPL-SCNC: 110 MMOL/L (ref 98–107)
CHLORIDE SERPL-SCNC: 113 MMOL/L (ref 98–107)
CHLORIDE SERPL-SCNC: 113 MMOL/L (ref 98–107)
CHLORIDE SERPL-SCNC: 114 MMOL/L (ref 98–107)
CO2 SERPL-SCNC: 16 MMOL/L (ref 21–32)
CO2 SERPL-SCNC: 19 MMOL/L (ref 21–32)
CO2 SERPL-SCNC: 21 MMOL/L (ref 21–32)
CO2 SERPL-SCNC: 24 MMOL/L (ref 21–32)
COLOR UR: YELLOW
CREAT SERPL-MCNC: 2.88 MG/DL (ref 0.5–1.05)
CREAT SERPL-MCNC: 3.19 MG/DL (ref 0.5–1.05)
CREAT SERPL-MCNC: 3.37 MG/DL (ref 0.5–1.05)
CREAT SERPL-MCNC: 3.38 MG/DL (ref 0.5–1.05)
DACRYOCYTES BLD QL SMEAR: NORMAL
EGFRCR SERPLBLD CKD-EPI 2021: 13 ML/MIN/1.73M*2
EGFRCR SERPLBLD CKD-EPI 2021: 13 ML/MIN/1.73M*2
EGFRCR SERPLBLD CKD-EPI 2021: 14 ML/MIN/1.73M*2
EGFRCR SERPLBLD CKD-EPI 2021: 16 ML/MIN/1.73M*2
EOSINOPHIL # BLD AUTO: 0.29 X10*3/UL (ref 0–0.4)
EOSINOPHIL NFR BLD AUTO: 3.3 %
ERYTHROCYTE [DISTWIDTH] IN BLOOD BY AUTOMATED COUNT: 26.2 % (ref 11.5–14.5)
FLUAV RNA RESP QL NAA+PROBE: NOT DETECTED
FLUBV RNA RESP QL NAA+PROBE: NOT DETECTED
GLUCOSE BLDV-MCNC: 95 MG/DL (ref 74–99)
GLUCOSE SERPL-MCNC: 114 MG/DL (ref 74–99)
GLUCOSE SERPL-MCNC: 121 MG/DL (ref 74–99)
GLUCOSE SERPL-MCNC: 133 MG/DL (ref 74–99)
GLUCOSE SERPL-MCNC: 97 MG/DL (ref 74–99)
GLUCOSE UR STRIP.AUTO-MCNC: NEGATIVE MG/DL
HCO3 BLDV-SCNC: 18.9 MMOL/L (ref 22–26)
HCT VFR BLD AUTO: 23.9 % (ref 36–46)
HCT VFR BLD EST: 23 % (ref 36–46)
HGB BLD-MCNC: 7.7 G/DL (ref 12–16)
HGB BLDV-MCNC: 7.5 G/DL (ref 12–16)
HOLD SPECIMEN: NORMAL
HYPOCHROMIA BLD QL SMEAR: NORMAL
IMM GRANULOCYTES # BLD AUTO: 0.05 X10*3/UL (ref 0–0.5)
IMM GRANULOCYTES NFR BLD AUTO: 0.6 % (ref 0–0.9)
INHALED O2 CONCENTRATION: 21 %
KETONES UR STRIP.AUTO-MCNC: NEGATIVE MG/DL
LACTATE BLDV-SCNC: 1 MMOL/L (ref 0.4–2)
LEUKOCYTE ESTERASE UR QL STRIP.AUTO: NEGATIVE
LYMPHOCYTES # BLD AUTO: 1.55 X10*3/UL (ref 0.8–3)
LYMPHOCYTES NFR BLD AUTO: 17.8 %
MCH RBC QN AUTO: 30.7 PG (ref 26–34)
MCHC RBC AUTO-ENTMCNC: 32.2 G/DL (ref 32–36)
MCV RBC AUTO: 95 FL (ref 80–100)
MONOCYTES # BLD AUTO: 0.79 X10*3/UL (ref 0.05–0.8)
MONOCYTES NFR BLD AUTO: 9 %
NEUTROPHILS # BLD AUTO: 6.02 X10*3/UL (ref 1.6–5.5)
NEUTROPHILS NFR BLD AUTO: 69 %
NITRITE UR QL STRIP.AUTO: NEGATIVE
NRBC BLD-RTO: 0.2 /100 WBCS (ref 0–0)
OVALOCYTES BLD QL SMEAR: NORMAL
OXYHGB MFR BLDV: 85.1 % (ref 45–75)
PCO2 BLDV: 35 MM HG (ref 41–51)
PH BLDV: 7.34 PH (ref 7.33–7.43)
PH UR STRIP.AUTO: 5 [PH]
PLATELET # BLD AUTO: 342 X10*3/UL (ref 150–450)
PO2 BLDV: 55 MM HG (ref 35–45)
POLYCHROMASIA BLD QL SMEAR: NORMAL
POTASSIUM BLDV-SCNC: 6 MMOL/L (ref 3.5–5.3)
POTASSIUM SERPL-SCNC: 5 MMOL/L (ref 3.5–5.3)
POTASSIUM SERPL-SCNC: 6 MMOL/L (ref 3.5–5.3)
POTASSIUM SERPL-SCNC: 6.1 MMOL/L (ref 3.5–5.3)
PROT SERPL-MCNC: 6.5 G/DL (ref 6.4–8.2)
PROT UR STRIP.AUTO-MCNC: ABNORMAL MG/DL
RBC # BLD AUTO: 2.51 X10*6/UL (ref 4–5.2)
RBC # UR STRIP.AUTO: NEGATIVE /UL
RBC #/AREA URNS AUTO: ABNORMAL /HPF
RBC MORPH BLD: NORMAL
SAO2 % BLDV: 87 % (ref 45–75)
SARS-COV-2 RNA RESP QL NAA+PROBE: NOT DETECTED
SODIUM BLDV-SCNC: 137 MMOL/L (ref 136–145)
SODIUM SERPL-SCNC: 136 MMOL/L (ref 136–145)
SODIUM SERPL-SCNC: 137 MMOL/L (ref 136–145)
SODIUM SERPL-SCNC: 139 MMOL/L (ref 136–145)
SODIUM SERPL-SCNC: 139 MMOL/L (ref 136–145)
SP GR UR STRIP.AUTO: 1.01
UROBILINOGEN UR STRIP.AUTO-MCNC: <2 MG/DL
WBC # BLD AUTO: 8.7 X10*3/UL (ref 4.4–11.3)
WBC #/AREA URNS AUTO: ABNORMAL /HPF

## 2024-02-08 PROCEDURE — 76770 US EXAM ABDO BACK WALL COMP: CPT

## 2024-02-08 PROCEDURE — 2500000001 HC RX 250 WO HCPCS SELF ADMINISTERED DRUGS (ALT 637 FOR MEDICARE OP): Performed by: INTERNAL MEDICINE

## 2024-02-08 PROCEDURE — 87040 BLOOD CULTURE FOR BACTERIA: CPT | Mod: PORLAB | Performed by: EMERGENCY MEDICINE

## 2024-02-08 PROCEDURE — 71046 X-RAY EXAM CHEST 2 VIEWS: CPT

## 2024-02-08 PROCEDURE — 2500000002 HC RX 250 W HCPCS SELF ADMINISTERED DRUGS (ALT 637 FOR MEDICARE OP, ALT 636 FOR OP/ED): Performed by: INTERNAL MEDICINE

## 2024-02-08 PROCEDURE — 2500000004 HC RX 250 GENERAL PHARMACY W/ HCPCS (ALT 636 FOR OP/ED): Performed by: INTERNAL MEDICINE

## 2024-02-08 PROCEDURE — 36415 COLL VENOUS BLD VENIPUNCTURE: CPT | Performed by: EMERGENCY MEDICINE

## 2024-02-08 PROCEDURE — 71046 X-RAY EXAM CHEST 2 VIEWS: CPT | Performed by: RADIOLOGY

## 2024-02-08 PROCEDURE — 2500000005 HC RX 250 GENERAL PHARMACY W/O HCPCS: Performed by: INTERNAL MEDICINE

## 2024-02-08 PROCEDURE — 87636 SARSCOV2 & INF A&B AMP PRB: CPT | Performed by: EMERGENCY MEDICINE

## 2024-02-08 PROCEDURE — 81001 URINALYSIS AUTO W/SCOPE: CPT | Performed by: EMERGENCY MEDICINE

## 2024-02-08 PROCEDURE — 87086 URINE CULTURE/COLONY COUNT: CPT | Mod: PORLAB | Performed by: EMERGENCY MEDICINE

## 2024-02-08 PROCEDURE — 84132 ASSAY OF SERUM POTASSIUM: CPT | Performed by: INTERNAL MEDICINE

## 2024-02-08 PROCEDURE — 2060000001 HC INTERMEDIATE ICU ROOM DAILY

## 2024-02-08 PROCEDURE — 2500000002 HC RX 250 W HCPCS SELF ADMINISTERED DRUGS (ALT 637 FOR MEDICARE OP, ALT 636 FOR OP/ED): Performed by: EMERGENCY MEDICINE

## 2024-02-08 PROCEDURE — 99223 1ST HOSP IP/OBS HIGH 75: CPT | Performed by: INTERNAL MEDICINE

## 2024-02-08 PROCEDURE — 2500000004 HC RX 250 GENERAL PHARMACY W/ HCPCS (ALT 636 FOR OP/ED): Performed by: EMERGENCY MEDICINE

## 2024-02-08 PROCEDURE — 84132 ASSAY OF SERUM POTASSIUM: CPT | Performed by: EMERGENCY MEDICINE

## 2024-02-08 PROCEDURE — 2500000001 HC RX 250 WO HCPCS SELF ADMINISTERED DRUGS (ALT 637 FOR MEDICARE OP): Performed by: EMERGENCY MEDICINE

## 2024-02-08 PROCEDURE — 76770 US EXAM ABDO BACK WALL COMP: CPT | Performed by: RADIOLOGY

## 2024-02-08 PROCEDURE — 2500000005 HC RX 250 GENERAL PHARMACY W/O HCPCS: Performed by: EMERGENCY MEDICINE

## 2024-02-08 PROCEDURE — 99233 SBSQ HOSP IP/OBS HIGH 50: CPT | Performed by: INTERNAL MEDICINE

## 2024-02-08 RX ORDER — PREGABALIN 50 MG/1
100 CAPSULE ORAL 2 TIMES DAILY
Status: DISCONTINUED | OUTPATIENT
Start: 2024-02-08 | End: 2024-02-11 | Stop reason: HOSPADM

## 2024-02-08 RX ORDER — SODIUM CHLORIDE 9 MG/ML
100 INJECTION, SOLUTION INTRAVENOUS CONTINUOUS
Status: DISCONTINUED | OUTPATIENT
Start: 2024-02-08 | End: 2024-02-08

## 2024-02-08 RX ORDER — ALBUTEROL SULFATE 90 UG/1
2 AEROSOL, METERED RESPIRATORY (INHALATION) EVERY 6 HOURS PRN
Status: DISCONTINUED | OUTPATIENT
Start: 2024-02-08 | End: 2024-02-11 | Stop reason: HOSPADM

## 2024-02-08 RX ORDER — ACETAMINOPHEN 325 MG/1
650 TABLET ORAL EVERY 4 HOURS PRN
Status: DISCONTINUED | OUTPATIENT
Start: 2024-02-08 | End: 2024-02-11 | Stop reason: HOSPADM

## 2024-02-08 RX ORDER — ONDANSETRON HYDROCHLORIDE 2 MG/ML
4 INJECTION, SOLUTION INTRAVENOUS EVERY 8 HOURS PRN
Status: DISCONTINUED | OUTPATIENT
Start: 2024-02-08 | End: 2024-02-11 | Stop reason: HOSPADM

## 2024-02-08 RX ORDER — PANTOPRAZOLE SODIUM 40 MG/1
40 TABLET, DELAYED RELEASE ORAL DAILY
Status: DISCONTINUED | OUTPATIENT
Start: 2024-02-08 | End: 2024-02-11 | Stop reason: HOSPADM

## 2024-02-08 RX ORDER — SODIUM POLYSTYRENE SULFONATE 15 G/60ML
30 SUSPENSION ORAL; RECTAL ONCE
Status: COMPLETED | OUTPATIENT
Start: 2024-02-08 | End: 2024-02-08

## 2024-02-08 RX ORDER — DEXTROSE 50 % IN WATER (D50W) INTRAVENOUS SYRINGE
25 ONCE
Status: COMPLETED | OUTPATIENT
Start: 2024-02-08 | End: 2024-02-08

## 2024-02-08 RX ORDER — HEPARIN SODIUM 5000 [USP'U]/ML
5000 INJECTION, SOLUTION INTRAVENOUS; SUBCUTANEOUS EVERY 12 HOURS
Status: DISCONTINUED | OUTPATIENT
Start: 2024-02-08 | End: 2024-02-11 | Stop reason: HOSPADM

## 2024-02-08 RX ORDER — SODIUM BICARBONATE 1 MEQ/ML
50 SYRINGE (ML) INTRAVENOUS ONCE
Status: COMPLETED | OUTPATIENT
Start: 2024-02-08 | End: 2024-02-08

## 2024-02-08 RX ORDER — FLUTICASONE PROPIONATE 50 MCG
2 SPRAY, SUSPENSION (ML) NASAL DAILY
Status: DISCONTINUED | OUTPATIENT
Start: 2024-02-08 | End: 2024-02-11 | Stop reason: HOSPADM

## 2024-02-08 RX ORDER — METOPROLOL TARTRATE 25 MG/1
25 TABLET, FILM COATED ORAL 2 TIMES DAILY
Status: DISCONTINUED | OUTPATIENT
Start: 2024-02-08 | End: 2024-02-11 | Stop reason: HOSPADM

## 2024-02-08 RX ORDER — CEFTRIAXONE 1 G/50ML
1 INJECTION, SOLUTION INTRAVENOUS ONCE
Status: COMPLETED | OUTPATIENT
Start: 2024-02-08 | End: 2024-02-08

## 2024-02-08 RX ORDER — DEXTROSE MONOHYDRATE 100 MG/ML
50 INJECTION, SOLUTION INTRAVENOUS CONTINUOUS
Status: ACTIVE | OUTPATIENT
Start: 2024-02-08 | End: 2024-02-08

## 2024-02-08 RX ORDER — POLYETHYLENE GLYCOL 3350 17 G/17G
17 POWDER, FOR SOLUTION ORAL DAILY
Status: DISCONTINUED | OUTPATIENT
Start: 2024-02-08 | End: 2024-02-11 | Stop reason: HOSPADM

## 2024-02-08 RX ORDER — PRAVASTATIN SODIUM 40 MG/1
40 TABLET ORAL NIGHTLY
Status: DISCONTINUED | OUTPATIENT
Start: 2024-02-08 | End: 2024-02-11 | Stop reason: HOSPADM

## 2024-02-08 RX ORDER — ALLOPURINOL 100 MG/1
100 TABLET ORAL DAILY
Status: DISCONTINUED | OUTPATIENT
Start: 2024-02-08 | End: 2024-02-11 | Stop reason: HOSPADM

## 2024-02-08 RX ORDER — ASPIRIN 81 MG/1
81 TABLET ORAL DAILY
Status: DISCONTINUED | OUTPATIENT
Start: 2024-02-08 | End: 2024-02-11 | Stop reason: HOSPADM

## 2024-02-08 RX ORDER — AMLODIPINE BESYLATE 5 MG/1
5 TABLET ORAL DAILY
Status: DISCONTINUED | OUTPATIENT
Start: 2024-02-08 | End: 2024-02-11 | Stop reason: HOSPADM

## 2024-02-08 RX ORDER — CALCIUM GLUCONATE 20 MG/ML
2 INJECTION, SOLUTION INTRAVENOUS ONCE
Status: COMPLETED | OUTPATIENT
Start: 2024-02-08 | End: 2024-02-08

## 2024-02-08 RX ADMIN — DEXTROSE MONOHYDRATE 50 ML/HR: 100 INJECTION, SOLUTION INTRAVENOUS at 12:15

## 2024-02-08 RX ADMIN — SODIUM CHLORIDE 100 ML/HR: 9 INJECTION, SOLUTION INTRAVENOUS at 09:38

## 2024-02-08 RX ADMIN — PRAVASTATIN SODIUM 40 MG: 40 TABLET ORAL at 21:03

## 2024-02-08 RX ADMIN — HEPARIN SODIUM 5000 UNITS: 5000 INJECTION INTRAVENOUS; SUBCUTANEOUS at 21:11

## 2024-02-08 RX ADMIN — DEXTROSE MONOHYDRATE 25 G: 25 INJECTION, SOLUTION INTRAVENOUS at 01:11

## 2024-02-08 RX ADMIN — ASPIRIN 81 MG: 81 TABLET, COATED ORAL at 09:38

## 2024-02-08 RX ADMIN — DEXTROSE MONOHYDRATE 25 G: 25 INJECTION, SOLUTION INTRAVENOUS at 12:14

## 2024-02-08 RX ADMIN — PREGABALIN 100 MG: 50 CAPSULE ORAL at 09:38

## 2024-02-08 RX ADMIN — SODIUM BICARBONATE 50 MEQ: 84 INJECTION INTRAVENOUS at 01:09

## 2024-02-08 RX ADMIN — SODIUM POLYSTYRENE SULFONATE 30 G: 15 SUSPENSION ORAL; RECTAL at 01:11

## 2024-02-08 RX ADMIN — SODIUM CHLORIDE, POTASSIUM CHLORIDE, SODIUM LACTATE AND CALCIUM CHLORIDE 1000 ML: 600; 310; 30; 20 INJECTION, SOLUTION INTRAVENOUS at 05:55

## 2024-02-08 RX ADMIN — METOPROLOL TARTRATE 25 MG: 25 TABLET, FILM COATED ORAL at 21:11

## 2024-02-08 RX ADMIN — HEPARIN SODIUM 5000 UNITS: 5000 INJECTION INTRAVENOUS; SUBCUTANEOUS at 09:39

## 2024-02-08 RX ADMIN — METOPROLOL TARTRATE 25 MG: 25 TABLET, FILM COATED ORAL at 09:38

## 2024-02-08 RX ADMIN — AMLODIPINE BESYLATE 5 MG: 5 TABLET ORAL at 09:38

## 2024-02-08 RX ADMIN — INSULIN HUMAN 10 UNITS: 100 INJECTION, SOLUTION PARENTERAL at 01:10

## 2024-02-08 RX ADMIN — CEFTRIAXONE SODIUM 1 G: 1 INJECTION, SOLUTION INTRAVENOUS at 03:42

## 2024-02-08 RX ADMIN — PANTOPRAZOLE SODIUM 40 MG: 40 TABLET, DELAYED RELEASE ORAL at 09:38

## 2024-02-08 RX ADMIN — INSULIN HUMAN 10 UNITS: 100 INJECTION, SOLUTION PARENTERAL at 12:12

## 2024-02-08 RX ADMIN — SODIUM ZIRCONIUM CYCLOSILICATE 10 G: 10 POWDER, FOR SUSPENSION ORAL at 09:38

## 2024-02-08 RX ADMIN — ALLOPURINOL 100 MG: 100 TABLET ORAL at 09:38

## 2024-02-08 RX ADMIN — POLYETHYLENE GLYCOL 3350 17 G: 17 POWDER, FOR SOLUTION ORAL at 09:38

## 2024-02-08 RX ADMIN — CALCIUM GLUCONATE 2 G: 20 INJECTION, SOLUTION INTRAVENOUS at 12:12

## 2024-02-08 RX ADMIN — PREGABALIN 100 MG: 50 CAPSULE ORAL at 21:03

## 2024-02-08 RX ADMIN — SODIUM BICARBONATE 50 MEQ: 84 INJECTION INTRAVENOUS at 12:12

## 2024-02-08 SDOH — SOCIAL STABILITY: SOCIAL INSECURITY: ARE THERE ANY APPARENT SIGNS OF INJURIES/BEHAVIORS THAT COULD BE RELATED TO ABUSE/NEGLECT?: NO

## 2024-02-08 SDOH — SOCIAL STABILITY: SOCIAL INSECURITY: HAVE YOU HAD THOUGHTS OF HARMING ANYONE ELSE?: NO

## 2024-02-08 SDOH — SOCIAL STABILITY: SOCIAL INSECURITY: DO YOU FEEL UNSAFE GOING BACK TO THE PLACE WHERE YOU ARE LIVING?: NO

## 2024-02-08 SDOH — SOCIAL STABILITY: SOCIAL INSECURITY: ARE YOU OR HAVE YOU BEEN THREATENED OR ABUSED PHYSICALLY, EMOTIONALLY, OR SEXUALLY BY ANYONE?: NO

## 2024-02-08 SDOH — SOCIAL STABILITY: SOCIAL INSECURITY: DOES ANYONE TRY TO KEEP YOU FROM HAVING/CONTACTING OTHER FRIENDS OR DOING THINGS OUTSIDE YOUR HOME?: NO

## 2024-02-08 SDOH — SOCIAL STABILITY: SOCIAL INSECURITY: WERE YOU ABLE TO COMPLETE ALL THE BEHAVIORAL HEALTH SCREENINGS?: YES

## 2024-02-08 SDOH — SOCIAL STABILITY: SOCIAL INSECURITY: HAS ANYONE EVER THREATENED TO HURT YOUR FAMILY OR YOUR PETS?: NO

## 2024-02-08 SDOH — SOCIAL STABILITY: SOCIAL INSECURITY: ABUSE: ADULT

## 2024-02-08 SDOH — HEALTH STABILITY: MENTAL HEALTH: EXPERIENCED ANY OF THE FOLLOWING LIFE EVENTS: OTHER (COMMENT)

## 2024-02-08 SDOH — SOCIAL STABILITY: SOCIAL INSECURITY: DO YOU FEEL ANYONE HAS EXPLOITED OR TAKEN ADVANTAGE OF YOU FINANCIALLY OR OF YOUR PERSONAL PROPERTY?: NO

## 2024-02-08 ASSESSMENT — ENCOUNTER SYMPTOMS
DYSURIA: 1
BLOOD IN STOOL: 0
COUGH: 0
CONFUSION: 0
SPEECH DIFFICULTY: 0
NUMBNESS: 0
EYES NEGATIVE: 1
DIZZINESS: 0
FATIGUE: 1
ABDOMINAL PAIN: 0
SHORTNESS OF BREATH: 0
NECK PAIN: 0
FREQUENCY: 1
BACK PAIN: 0
CHEST TIGHTNESS: 0
CHILLS: 0
FLANK PAIN: 0
FEVER: 0
WHEEZING: 0
SORE THROAT: 0
SEIZURES: 0
POLYDIPSIA: 0
PALPITATIONS: 0
VOMITING: 0
WEAKNESS: 1
NAUSEA: 1

## 2024-02-08 ASSESSMENT — ACTIVITIES OF DAILY LIVING (ADL)
WALKS IN HOME: NEEDS ASSISTANCE
JUDGMENT_ADEQUATE_SAFELY_COMPLETE_DAILY_ACTIVITIES: YES
ADEQUATE_TO_COMPLETE_ADL: YES
HEARING - RIGHT EAR: FUNCTIONAL
DRESSING YOURSELF: INDEPENDENT
TOILETING: INDEPENDENT
FEEDING YOURSELF: INDEPENDENT
BATHING: INDEPENDENT
HEARING - LEFT EAR: FUNCTIONAL
LACK_OF_TRANSPORTATION: NO
GROOMING: INDEPENDENT
PATIENT'S MEMORY ADEQUATE TO SAFELY COMPLETE DAILY ACTIVITIES?: YES
ASSISTIVE_DEVICE: WALKER

## 2024-02-08 ASSESSMENT — COGNITIVE AND FUNCTIONAL STATUS - GENERAL
STANDING UP FROM CHAIR USING ARMS: A LITTLE
WALKING IN HOSPITAL ROOM: A LITTLE
CLIMB 3 TO 5 STEPS WITH RAILING: A LOT
MOVING TO AND FROM BED TO CHAIR: A LITTLE
STANDING UP FROM CHAIR USING ARMS: A LITTLE
DAILY ACTIVITIY SCORE: 24
MOBILITY SCORE: 19
WALKING IN HOSPITAL ROOM: A LITTLE
PATIENT BASELINE BEDBOUND: NO
CLIMB 3 TO 5 STEPS WITH RAILING: A LOT
MOVING TO AND FROM BED TO CHAIR: A LITTLE
MOBILITY SCORE: 18
TURNING FROM BACK TO SIDE WHILE IN FLAT BAD: A LITTLE

## 2024-02-08 ASSESSMENT — LIFESTYLE VARIABLES
HOW MANY STANDARD DRINKS CONTAINING ALCOHOL DO YOU HAVE ON A TYPICAL DAY: PATIENT DOES NOT DRINK
SKIP TO QUESTIONS 9-10: 1
HOW OFTEN DO YOU HAVE A DRINK CONTAINING ALCOHOL: NEVER
AUDIT-C TOTAL SCORE: 0
AUDIT-C TOTAL SCORE: 0
PRESCIPTION_ABUSE_PAST_12_MONTHS: NO
HOW OFTEN DO YOU HAVE 6 OR MORE DRINKS ON ONE OCCASION: NEVER
SUBSTANCE_ABUSE_PAST_12_MONTHS: NO

## 2024-02-08 ASSESSMENT — PAIN SCALES - GENERAL: PAINLEVEL_OUTOF10: 0 - NO PAIN

## 2024-02-08 ASSESSMENT — PAIN - FUNCTIONAL ASSESSMENT: PAIN_FUNCTIONAL_ASSESSMENT: 0-10

## 2024-02-08 NOTE — H&P
ory Of Present Illness  Tana Hargrove is a 78 y.o. female with past medical history of myelodysplastic syndrome, atrial fibrillation, heart failure preserved EF, coronary artery disease, COPD, hypertension, dyslipidemia, diabetes mellitus type 2, chronic kidney disease stage IV presenting with complaint of generalized weakness to the emergency room.  Patient states that she is having difficult time ambulating.  Patient states that she normally gets around with a walker but had a blood transfusion yesterday for myelodysplastic disease and now states that her legs are just too weak to ambulate.  In the emergency room patient is found to have blood pressure 163/57 pulse of 87 respirations 21 afebrile room air pulse ox 100%.  Lab data shows patient's glucose 133 patient's potassium is 6.1 with a blood of 16 BUN is 65 creatinine of 3.38 baseline creatinine is approximately 2.6-3.  Patient does admit to poor oral intake states she just does not have an appetite last few days.  CBC shows 8700 white count.  H&H is 7.7 and 23.9 with baseline hemoglobin typically hovering in the 7-8 range.  Patient's urinalysis has 21-50 WBCs but surprisingly has negative nitrite and leukocyte esterase patient does complain of some dysuria.  Chest x-ray shows no infiltrate but poor inspiratory effort.  Patient's influenza AMB are not detected coronavirus is not detected.  EKG shows some peaked T's but no heart block.  No interventricular conduction delay.   Patient is being admitted to the hospital for acute kidney injury on chronic kidney disease with hyperkalemia.  Patient with UTI will also be treated.     Past Medical History  She has a past medical history of Abnormal levels of other serum enzymes, Acute kidney failure (CMS/HCC), Anemia, CKD (chronic kidney disease), COPD (chronic obstructive pulmonary disease) (CMS/HCC), Disease of blood and blood-forming organs, unspecified, HLD (hyperlipidemia), MDS (myelodysplastic syndrome)  (CMS/[facility]), Personal history of other diseases of the musculoskeletal system and connective tissue, Personal history of other specified conditions, Personal history of other specified conditions, and Type 2 diabetes mellitus (CMS/[facility]).  myelodysplastic syndrome, atrial fibrillation, heart failure preserved EF, coronary artery disease, COPD, hypertension, dyslipidemia, diabetes mellitus type 2, chronic kidney disease stage IV    Surgical History  She has a past surgical history that includes Other surgical history (12/13/2019); Other surgical history (12/13/2019); Other surgical history (12/13/2019); Other surgical history (12/13/2019); Other surgical history (12/13/2019); Other surgical history (12/13/2019); Other surgical history (12/13/2019); Other surgical history (04/16/2021); MR angio head wo IV contrast (11/20/2020); MR angio neck wo IV contrast (11/20/2020); Breast biopsy (Left); Hysterectomy; Breast lumpectomy; Appendectomy; Colonoscopy; and Oophorectomy.     Social History  She reports that she has never smoked. She has never used smokeless tobacco. She reports that she does not currently use alcohol. She reports that she does not use drugs.    Family History  Noncontributory in this 78-year-old female     Allergies  Codeine, Hydrocodone, Hydrocodone-acetaminophen, Meperidine (pf), Tramadol, Piperacillin-tazobactam, Adhesive tape-silicones, and Meperidine    Meds    Current Facility-Administered Medications:     lactated Ringer's bolus 1,000 mL, 1,000 mL, intravenous, Once, Tabitha Sanders DO, Last Rate: 1,000 mL/hr at 02/08/24 0555, 1,000 mL at 02/08/24 0555    Facility-Administered Medications Ordered in Other Encounters:     albuterol 2.5 mg /3 mL (0.083 %) nebulizer solution 3 mL, 3 mL, nebulization, PRN, Rosanne M Casal, APRN-CNP, DNP    dextrose 5 % in water (D5W) bolus, 500 mL, intravenous, PRN, Rosanne M Casal, APRN-CNP, DNP    diphenhydrAMINE (BENADryl) injection 50 mg, 50 mg, intravenous, PRNPinky  Casal, APRN-CNP, DNP    EPINEPHrine (Epipen) injection syringe 0.3 mg, 0.3 mg, intramuscular, q5 min PRN, Pinky ALEX Casal, APRN-CNP, DNP    famotidine PF (Pepcid) injection 20 mg, 20 mg, intravenous, Once PRN, Pinky ALEX Casal, APRN-CNP, DNP    methylPREDNISolone sod suc / (SOLU-Medrol) 40 mg injection 40 mg, 40 mg, intravenous, PRN, Rosanne M Casal, APRN-CNP, DNP    sodium chloride 0.9 % bolus 500 mL, 500 mL, intravenous, PRN, Pinky ALEX Casal, APRN-CNP, DNP    Review of Systems   Constitutional:  Positive for fatigue. Negative for chills and fever.   HENT:  Negative for congestion and sore throat.    Eyes: Negative.    Respiratory:  Negative for cough, chest tightness, shortness of breath and wheezing.    Cardiovascular:  Negative for palpitations and leg swelling.   Gastrointestinal:  Positive for nausea. Negative for abdominal pain, blood in stool and vomiting.   Endocrine: Negative for polydipsia and polyuria.   Genitourinary:  Positive for dysuria and frequency. Negative for flank pain.   Musculoskeletal:  Positive for gait problem. Negative for back pain and neck pain.   Skin: Negative.    Neurological:  Positive for weakness. Negative for dizziness, seizures, speech difficulty and numbness.   Psychiatric/Behavioral:  Negative for confusion.         Physical Exam  Constitutional:       General: She is not in acute distress.     Appearance: Normal appearance. She is not toxic-appearing.   HENT:      Head: Normocephalic and atraumatic.      Mouth/Throat:      Mouth: Mucous membranes are dry.   Eyes:      Extraocular Movements: Extraocular movements intact.      Pupils: Pupils are equal, round, and reactive to light.   Cardiovascular:      Rate and Rhythm: Normal rate and regular rhythm.      Pulses: Normal pulses.      Heart sounds: Murmur heard.   Pulmonary:      Effort: Pulmonary effort is normal.      Breath sounds: Normal breath sounds. No wheezing, rhonchi or rales.   Abdominal:      General: Abdomen is  flat. Bowel sounds are normal.      Palpations: Abdomen is soft.      Tenderness: There is no abdominal tenderness. There is no guarding or rebound.   Musculoskeletal:         General: No swelling. Normal range of motion.      Right lower leg: No edema.      Left lower leg: No edema.   Skin:     Coloration: Skin is not jaundiced.      Findings: No erythema or rash.   Neurological:      General: No focal deficit present.      Mental Status: She is alert and oriented to person, place, and time.      Sensory: No sensory deficit.      Motor: Weakness present.      Gait: Gait abnormal.   Psychiatric:         Mood and Affect: Mood normal.         Thought Content: Thought content normal.         Judgment: Judgment normal.          Last Recorded Vitals  /60   Pulse 86   Temp 37 °C (98.6 °F) (Oral)   Resp 18   Wt 79.8 kg (176 lb)   SpO2 95%     Relevant Results     Results for orders placed or performed during the hospital encounter of 02/07/24 (from the past 24 hour(s))   ECG 12 lead   Result Value Ref Range    Ventricular Rate 84 BPM    Atrial Rate 84 BPM    NC Interval 168 ms    QRS Duration 83 ms    QT Interval 365 ms    QTC Calculation(Bazett) 432 ms    P Axis 39 degrees    R Axis 12 degrees    T Axis 38 degrees    QRS Count 14 beats    Q Onset 249 ms    T Offset 432 ms    QTC Fredericia 408 ms   CBC and Auto Differential   Result Value Ref Range    WBC 8.7 4.4 - 11.3 x10*3/uL    nRBC 0.2 (H) 0.0 - 0.0 /100 WBCs    RBC 2.51 (L) 4.00 - 5.20 x10*6/uL    Hemoglobin 7.7 (L) 12.0 - 16.0 g/dL    Hematocrit 23.9 (L) 36.0 - 46.0 %    MCV 95 80 - 100 fL    MCH 30.7 26.0 - 34.0 pg    MCHC 32.2 32.0 - 36.0 g/dL    RDW 26.2 (H) 11.5 - 14.5 %    Platelets 342 150 - 450 x10*3/uL    Neutrophils % 69.0 40.0 - 80.0 %    Immature Granulocytes %, Automated 0.6 0.0 - 0.9 %    Lymphocytes % 17.8 13.0 - 44.0 %    Monocytes % 9.0 2.0 - 10.0 %    Eosinophils % 3.3 0.0 - 6.0 %    Basophils % 0.3 0.0 - 2.0 %    Neutrophils Absolute  6.02 (H) 1.60 - 5.50 x10*3/uL    Immature Granulocytes Absolute, Automated 0.05 0.00 - 0.50 x10*3/uL    Lymphocytes Absolute 1.55 0.80 - 3.00 x10*3/uL    Monocytes Absolute 0.79 0.05 - 0.80 x10*3/uL    Eosinophils Absolute 0.29 0.00 - 0.40 x10*3/uL    Basophils Absolute 0.03 0.00 - 0.10 x10*3/uL   Comprehensive metabolic panel   Result Value Ref Range    Glucose 133 (H) 74 - 99 mg/dL    Sodium 136 136 - 145 mmol/L    Potassium 6.1 (HH) 3.5 - 5.3 mmol/L    Chloride 113 (H) 98 - 107 mmol/L    Bicarbonate 16 (L) 21 - 32 mmol/L    Anion Gap 13 10 - 20 mmol/L    Urea Nitrogen 65 (H) 6 - 23 mg/dL    Creatinine 3.38 (H) 0.50 - 1.05 mg/dL    eGFR 13 (L) >60 mL/min/1.73m*2    Calcium 9.4 8.6 - 10.3 mg/dL    Albumin 3.7 3.4 - 5.0 g/dL    Alkaline Phosphatase 63 33 - 136 U/L    Total Protein 6.5 6.4 - 8.2 g/dL    AST 13 9 - 39 U/L    Bilirubin, Total 0.4 0.0 - 1.2 mg/dL    ALT 9 7 - 45 U/L   Troponin I, High Sensitivity   Result Value Ref Range    Troponin I, High Sensitivity 12 0 - 13 ng/L   B-Type Natriuretic Peptide   Result Value Ref Range     (H) 0 - 99 pg/mL   Morphology   Result Value Ref Range    RBC Morphology See Below     Polychromasia Mild     Hypochromia Mild     Ovalocytes Few     Teardrop Cells Few     Basophilic Stippling Present    Urinalysis with Reflex Culture and Microscopic   Result Value Ref Range    Color, Urine Yellow Straw, Yellow    Appearance, Urine Clear Clear    Specific Gravity, Urine 1.009 1.005 - 1.035    pH, Urine 5.0 5.0, 5.5, 6.0, 6.5, 7.0, 7.5, 8.0    Protein, Urine 100 (2+) (N) NEGATIVE mg/dL    Glucose, Urine NEGATIVE NEGATIVE mg/dL    Blood, Urine NEGATIVE NEGATIVE    Ketones, Urine NEGATIVE NEGATIVE mg/dL    Bilirubin, Urine NEGATIVE NEGATIVE    Urobilinogen, Urine <2.0 <2.0 mg/dL    Nitrite, Urine NEGATIVE NEGATIVE    Leukocyte Esterase, Urine NEGATIVE NEGATIVE   Urinalysis Microscopic   Result Value Ref Range    WBC, Urine 21-50 (A) 1-5, NONE /HPF    RBC, Urine NONE NONE, 1-2,  3-5 /HPF    Bacteria, Urine 2+ (A) NONE SEEN /HPF   Influenza A, and B PCR   Result Value Ref Range    Flu A Result Not Detected Not Detected    Flu B Result Not Detected Not Detected   Sars-CoV-2 PCR   Result Value Ref Range    Coronavirus 2019, PCR Not Detected Not Detected   Blood Gas Venous Full Panel   Result Value Ref Range    POCT pH, Venous 7.34 7.33 - 7.43 pH    POCT pCO2, Venous 35 (L) 41 - 51 mm Hg    POCT pO2, Venous 55 (H) 35 - 45 mm Hg    POCT SO2, Venous 87 (H) 45 - 75 %    POCT Oxy Hemoglobin, Venous 85.1 (H) 45.0 - 75.0 %    POCT Hematocrit Calculated, Venous 23.0 (L) 36.0 - 46.0 %    POCT Sodium, Venous 137 136 - 145 mmol/L    POCT Potassium, Venous 6.0 (H) 3.5 - 5.3 mmol/L    POCT Chloride, Venous 114 (H) 98 - 107 mmol/L    POCT Ionized Calicum, Venous 1.41 (H) 1.10 - 1.33 mmol/L    POCT Glucose, Venous 95 74 - 99 mg/dL    POCT Lactate, Venous 1.0 0.4 - 2.0 mmol/L    POCT Base Excess, Venous -6.3 (L) -2.0 - 3.0 mmol/L    POCT HCO3 Calculated, Venous 18.9 (L) 22.0 - 26.0 mmol/L    POCT Hemoglobin, Venous 7.5 (L) 12.0 - 16.0 g/dL    POCT Anion Gap, Venous 10.0 10.0 - 25.0 mmol/L    Patient Temperature 37.0 degrees Celsius    FiO2 21 %   Potassium   Result Value Ref Range    Potassium 6.0 (H) 3.5 - 5.3 mmol/L     *Note: Due to a large number of results and/or encounters for the requested time period, some results have not been displayed. A complete set of results can be found in Results Review.        Recent Imaging  ECG 12 lead    Result Date: 2/8/2024  Sinus rhythm    XR chest 2 views    Result Date: 2/8/2024  Interpreted By:  Finkelstein, Evan, STUDY: XR CHEST 2 VIEWS;  2/8/2024 1:12 am   INDICATION: Signs/Symptoms:SOB. Rales RLL.   COMPARISON: Chest radiograph 08/14/2023   ACCESSION NUMBER(S): [id number]   ORDERING CLINICIAN: DAHIANA DALEY   FINDINGS: Low lung volumes with bronchovascular crowding.   CARDIOMEDIASTINAL SILHOUETTE: Cardiomediastinal silhouette is normal in size and configuration.    LUNGS: No pulmonary consolidation, pleural effusion or pneumothorax.   ABDOMEN: No remarkable upper abdominal findings.   BONES: No acute osseous abnormality.       Low lung volumes with bronchovascular crowding.   MACRO: None.   Signed by: Evan Finkelstein 2/8/2024 1:20 AM Dictation workstation:   [id number]       Assessment and Plan  #1 acute kidney injury with hyperkalemia  Patient with chronic kidney disease.  Appears to be dehydrated clinically.  Patient's potassium level is 6.1.  Did receive dextrose and insulin, sodium bicarb, Kayexalate, in the emergency room but potassium delayed diminished to 6.0.  Patient will be started on Lokelma.  Hold patient's ACE inhibitor and diuretics at present time.  Nephrology consultation to be obtained.  Normal saline volume expansion to be given to patient.    #2 myelodysplastic syndrome  Patient was transfused 1 unit packed red blood cells yesterday.  States that it was probably every month that she requires transfusion and this has been going on for years.    #3 UTI  Patient will be treated with Rocephin pending blood and urine cultures    #4 generalized weakness  Probably related to patient's UTI as well as acute kidney injury.  Patient to have physical therapy occupational therapy evaluation.    #5 chronic medical conditions at baseline  Hypertension--will be holding patient's lisinopril due to hyperkalemia and acute kidney injury  Diabetes mellitus type 2--- will hold patient's oral meds with patient's worsening renal function patient will be placed on/scale insulin coverage  Coronary artery disease with preserved EF  Atrial fibrillation  Dyslipidemia  COPD      Alon Viramontes MD

## 2024-02-08 NOTE — PROGRESS NOTES
"Physical Therapy                 Therapy Communication Note    Patient Name: Tana Hargrove  MRN: [id number]  Today's Date: 2/8/2024     Discipline: Physical Therapy    Missed Visit Reason: Patient refused (pt states she feels \"not very good\", pt declined PT, requested \"tomorrow\". Observed lethargy. low speech volume.)    Missed Time: Attempt  "

## 2024-02-08 NOTE — PROGRESS NOTES
Tana Hargrove is a 78 y.o. female on day 0 of admission presenting with Acute renal failure superimposed on chronic kidney disease, unspecified acute renal failure type, unspecified CKD stage (CMS/[facility]).      Subjective   Tana Hargrove is a 78 y.o. female with past medical history of myelodysplastic syndrome, atrial fibrillation, heart failure preserved EF, coronary artery disease, COPD, hypertension, dyslipidemia, diabetes mellitus type 2, chronic kidney disease stage IV presenting with complaint of generalized weakness to the emergency room.  Patient states that she is having difficult time ambulating.  Patient states that she normally gets around with a walker but had a blood transfusion yesterday for myelodysplastic disease and now states that her legs are just too weak to ambulate. Lab data shows patient's glucose 133 patient's potassium is 6.1 with a blood of 16 BUN is 65 creatinine of 3.38 baseline creatinine is approximately 2.6-3. Patient does admit to poor oral intake states she just does not have an appetite last few days. CBC shows 8700 white count. H&H is 7.7 and 23.9 with baseline hemoglobin typically hovering in the 7-8 range. Patient's urinalysis has 21-50 WBCs with complaints of dysuria. Patient's potassium level is 6.1. Did receive dextrose and insulin, sodium bicarb, Kayexalate, in the emergency room but potassium delayed diminished to 6.0.     2/8: No acute events overnight. Patient sleeping comfortably this morning. Vitals stable. Endorses continued weakness, dry mouth, and fatigue. Denies any dysuria. Complains of increased urinary frequency.  Patient given another round of medication for hyperkalemia after that potassium did come down to 5.0.  Creatinine is down to 2.88.  Renal input and help is appreciated.       Objective     Last Recorded Vitals  /67 (BP Location: Right arm, Patient Position: Lying)   Pulse 87   Temp 36.9 °C (98.4 °F) (Temporal)   Resp 18   Wt 79.8 kg (176 lb)    SpO2 94%   Intake/Output last 3 Shifts:  No intake or output data in the 24 hours ending 02/08/24 0920    Admission Weight  Weight: 79.8 kg (176 lb) (02/07/24 2306)    Daily Weight  02/07/24 : 79.8 kg (176 lb)    Image Results  ECG 12 lead  Sinus rhythm  XR chest 2 views  Narrative: Interpreted By:  Finkelstein, Evan,   STUDY:  XR CHEST 2 VIEWS;  2/8/2024 1:12 am      INDICATION:  Signs/Symptoms:SOB. Rales RLL.      COMPARISON:  Chest radiograph 08/14/2023      ACCESSION NUMBER(S):  [id number]      ORDERING CLINICIAN:  DAHIANA DALEY      FINDINGS:  Low lung volumes with bronchovascular crowding.      CARDIOMEDIASTINAL SILHOUETTE:  Cardiomediastinal silhouette is normal in size and configuration.      LUNGS:  No pulmonary consolidation, pleural effusion or pneumothorax.      ABDOMEN:  No remarkable upper abdominal findings.      BONES:  No acute osseous abnormality.      Impression: Low lung volumes with bronchovascular crowding.      MACRO:  None.      Signed by: Evan Finkelstein 2/8/2024 1:20 AM  Dictation workstation:   [id number]      Physical Exam  Constitutional:       General: She is not in acute distress.  HENT:      Head: Normocephalic and atraumatic.      Mouth/Throat:      Mouth: Mucous membranes are dry.   Cardiovascular:      Rate and Rhythm: Normal rate and regular rhythm.      Heart sounds: Murmur heard.   Pulmonary:      Effort: Pulmonary effort is normal.      Breath sounds: Normal breath sounds.   Abdominal:      General: Abdomen is flat.      Palpations: Abdomen is soft.   Musculoskeletal:      Right lower leg: No edema.      Left lower leg: No edema.   Skin:     General: Skin is warm and dry.      Findings: Wound present.      Comments: Plantar surface of R first metatarsal 2cm circumferential pressure ulcer   Neurological:      General: No focal deficit present.      Motor: Weakness present.      Gait: Gait abnormal.         Relevant Results  Results for orders placed or performed during the  hospital encounter of 02/07/24 (from the past 24 hour(s))   ECG 12 lead   Result Value Ref Range    Ventricular Rate 84 BPM    Atrial Rate 84 BPM    IN Interval 168 ms    QRS Duration 83 ms    QT Interval 365 ms    QTC Calculation(Bazett) 432 ms    P Axis 39 degrees    R Axis 12 degrees    T Axis 38 degrees    QRS Count 14 beats    Q Onset 249 ms    T Offset 432 ms    QTC Fredericia 408 ms   CBC and Auto Differential   Result Value Ref Range    WBC 8.7 4.4 - 11.3 x10*3/uL    nRBC 0.2 (H) 0.0 - 0.0 /100 WBCs    RBC 2.51 (L) 4.00 - 5.20 x10*6/uL    Hemoglobin 7.7 (L) 12.0 - 16.0 g/dL    Hematocrit 23.9 (L) 36.0 - 46.0 %    MCV 95 80 - 100 fL    MCH 30.7 26.0 - 34.0 pg    MCHC 32.2 32.0 - 36.0 g/dL    RDW 26.2 (H) 11.5 - 14.5 %    Platelets 342 150 - 450 x10*3/uL    Neutrophils % 69.0 40.0 - 80.0 %    Immature Granulocytes %, Automated 0.6 0.0 - 0.9 %    Lymphocytes % 17.8 13.0 - 44.0 %    Monocytes % 9.0 2.0 - 10.0 %    Eosinophils % 3.3 0.0 - 6.0 %    Basophils % 0.3 0.0 - 2.0 %    Neutrophils Absolute 6.02 (H) 1.60 - 5.50 x10*3/uL    Immature Granulocytes Absolute, Automated 0.05 0.00 - 0.50 x10*3/uL    Lymphocytes Absolute 1.55 0.80 - 3.00 x10*3/uL    Monocytes Absolute 0.79 0.05 - 0.80 x10*3/uL    Eosinophils Absolute 0.29 0.00 - 0.40 x10*3/uL    Basophils Absolute 0.03 0.00 - 0.10 x10*3/uL   Comprehensive metabolic panel   Result Value Ref Range    Glucose 133 (H) 74 - 99 mg/dL    Sodium 136 136 - 145 mmol/L    Potassium 6.1 (HH) 3.5 - 5.3 mmol/L    Chloride 113 (H) 98 - 107 mmol/L    Bicarbonate 16 (L) 21 - 32 mmol/L    Anion Gap 13 10 - 20 mmol/L    Urea Nitrogen 65 (H) 6 - 23 mg/dL    Creatinine 3.38 (H) 0.50 - 1.05 mg/dL    eGFR 13 (L) >60 mL/min/1.73m*2    Calcium 9.4 8.6 - 10.3 mg/dL    Albumin 3.7 3.4 - 5.0 g/dL    Alkaline Phosphatase 63 33 - 136 U/L    Total Protein 6.5 6.4 - 8.2 g/dL    AST 13 9 - 39 U/L    Bilirubin, Total 0.4 0.0 - 1.2 mg/dL    ALT 9 7 - 45 U/L   Troponin I, High Sensitivity   Result  Value Ref Range    Troponin I, High Sensitivity 12 0 - 13 ng/L   B-Type Natriuretic Peptide   Result Value Ref Range     (H) 0 - 99 pg/mL   Morphology   Result Value Ref Range    RBC Morphology See Below     Polychromasia Mild     Hypochromia Mild     Ovalocytes Few     Teardrop Cells Few     Basophilic Stippling Present    Urinalysis with Reflex Culture and Microscopic   Result Value Ref Range    Color, Urine Yellow Straw, Yellow    Appearance, Urine Clear Clear    Specific Gravity, Urine 1.009 1.005 - 1.035    pH, Urine 5.0 5.0, 5.5, 6.0, 6.5, 7.0, 7.5, 8.0    Protein, Urine 100 (2+) (N) NEGATIVE mg/dL    Glucose, Urine NEGATIVE NEGATIVE mg/dL    Blood, Urine NEGATIVE NEGATIVE    Ketones, Urine NEGATIVE NEGATIVE mg/dL    Bilirubin, Urine NEGATIVE NEGATIVE    Urobilinogen, Urine <2.0 <2.0 mg/dL    Nitrite, Urine NEGATIVE NEGATIVE    Leukocyte Esterase, Urine NEGATIVE NEGATIVE   Urinalysis Microscopic   Result Value Ref Range    WBC, Urine 21-50 (A) 1-5, NONE /HPF    RBC, Urine NONE NONE, 1-2, 3-5 /HPF    Bacteria, Urine 2+ (A) NONE SEEN /HPF   Influenza A, and B PCR   Result Value Ref Range    Flu A Result Not Detected Not Detected    Flu B Result Not Detected Not Detected   Sars-CoV-2 PCR   Result Value Ref Range    Coronavirus 2019, PCR Not Detected Not Detected   Blood Gas Venous Full Panel   Result Value Ref Range    POCT pH, Venous 7.34 7.33 - 7.43 pH    POCT pCO2, Venous 35 (L) 41 - 51 mm Hg    POCT pO2, Venous 55 (H) 35 - 45 mm Hg    POCT SO2, Venous 87 (H) 45 - 75 %    POCT Oxy Hemoglobin, Venous 85.1 (H) 45.0 - 75.0 %    POCT Hematocrit Calculated, Venous 23.0 (L) 36.0 - 46.0 %    POCT Sodium, Venous 137 136 - 145 mmol/L    POCT Potassium, Venous 6.0 (H) 3.5 - 5.3 mmol/L    POCT Chloride, Venous 114 (H) 98 - 107 mmol/L    POCT Ionized Calicum, Venous 1.41 (H) 1.10 - 1.33 mmol/L    POCT Glucose, Venous 95 74 - 99 mg/dL    POCT Lactate, Venous 1.0 0.4 - 2.0 mmol/L    POCT Base Excess, Venous -6.3 (L)  -2.0 - 3.0 mmol/L    POCT HCO3 Calculated, Venous 18.9 (L) 22.0 - 26.0 mmol/L    POCT Hemoglobin, Venous 7.5 (L) 12.0 - 16.0 g/dL    POCT Anion Gap, Venous 10.0 10.0 - 25.0 mmol/L    Patient Temperature 37.0 degrees Celsius    FiO2 21 %   Potassium   Result Value Ref Range    Potassium 6.0 (H) 3.5 - 5.3 mmol/L   Basic metabolic panel   Result Value Ref Range    Glucose 121 (H) 74 - 99 mg/dL    Sodium 139 136 - 145 mmol/L    Potassium 6.0 (H) 3.5 - 5.3 mmol/L    Chloride 113 (H) 98 - 107 mmol/L    Bicarbonate 19 (L) 21 - 32 mmol/L    Anion Gap 13 10 - 20 mmol/L    Urea Nitrogen 68 (H) 6 - 23 mg/dL    Creatinine 3.37 (H) 0.50 - 1.05 mg/dL    eGFR 13 (L) >60 mL/min/1.73m*2    Calcium 9.5 8.6 - 10.3 mg/dL   Basic metabolic panel   Result Value Ref Range    Glucose 114 (H) 74 - 99 mg/dL    Sodium 137 136 - 145 mmol/L    Potassium 6.0 (H) 3.5 - 5.3 mmol/L    Chloride 114 (H) 98 - 107 mmol/L    Bicarbonate 21 21 - 32 mmol/L    Anion Gap 8 (L) 10 - 20 mmol/L    Urea Nitrogen 64 (H) 6 - 23 mg/dL    Creatinine 3.19 (H) 0.50 - 1.05 mg/dL    eGFR 14 (L) >60 mL/min/1.73m*2    Calcium 9.1 8.6 - 10.3 mg/dL     *Note: Due to a large number of results and/or encounters for the requested time period, some results have not been displayed. A complete set of results can be found in Results Review.        Assessment/Plan     Acute renal failure superimposed on chronic kidney disease  Hyperkalemia 2/2 acute renal failure  Myelodysplastic syndrome  UTI  Generalized weakness  Severe muscle weakness  Pressure ulcer of R first metatarsal  HTN  DM 2  CAD  A fib  DLD  COPD    Calcium gluconate NS IV 2g   Lokelma 10g q8 started  Hold ACE-I and diuretics  IV bicarb  Continuous NaCl 0.9%   Redraw BMP  Nephrology consult placed  Podiatry consult placed  X1 RECENTLY RBC transfusion  IV Rocephin   DM home medications held, started insulin coverage with sliding scale  PT/OT evaluation  DVT prophylaxis  Daily labs    Gregoria Solano  Shared visit with  student  Patient seen and examined  Note amended and addended  See my orders for complete plan

## 2024-02-08 NOTE — ED TRIAGE NOTES
Pt here with generalized weakness, she called [facility] because she could not get herself to the bathroom. She is feeling generally unwell. She is experiencing increased  urinary frequency and has had a UTI. Pt also got a blood transfusion today.

## 2024-02-08 NOTE — PROGRESS NOTES
Occupational Therapy                 Therapy Communication Note    Patient Name: Tana Hargrove  MRN: [id number]  Today's Date: 2/8/2024     Discipline: Occupational Therapy     Missed Visit Reason: Patient refused. Patient feeling very tired and unwell at this time.  Will attempt tomorrow.

## 2024-02-08 NOTE — PROGRESS NOTES
Tana Hargrove is a 78 y.o. female admitted for Acute renal failure superimposed on chronic kidney disease, unspecified acute renal failure type, unspecified CKD stage (CMS/[facility]). Pharmacy reviewed the patient's [id number] medications and allergies for accuracy.    The list below reflects the PTA list prior to pharmacy medication history. A summary a changes to the PTA medication list has been listed below. Please review each medication in order reconciliation for additional clarification and justification.    Source of information: pharmacy [facility]ton[facility] adames,[facility]    Medications added                                   Januvia                                  Pregabalin    Medications modified: n/a    Medications to be removed: allopurinol ( Not taking)                                              bumetanide,                                               glimepiride,                                               lansoprazole,                                                 lisinopril,                                                meclizine,(Not taking)                                                Metoprolol (no fill Hx )                                                pantoprazole    Medications of concern: n/a      Prior to Admission Medications   Prescriptions Last Dose Informant Patient Reported? Taking?   SITagliptin phosphate (Januvia) 100 mg tablet   No No   Sig: Take 1 tablet (100 mg) by mouth once daily.   acetaminophen (Tylenol) 500 mg tablet   Yes No   Sig: Take by mouth every 8 hours if needed.   albuterol 90 mcg/actuation inhaler   Yes No   Sig: Inhale 4 times a day as needed.   allopurinol (Zyloprim) 100 mg tablet   No No   Sig: Take 1 tablet (100 mg) by mouth once daily.   amLODIPine (Norvasc) 5 mg tablet   Yes No   Sig: Take 1 tablet (5 mg) by mouth once daily.   aspirin 81 mg EC tablet   Yes No   Sig: Take 1 tablet (81 mg) by mouth once daily.   bumetanide (Bumex) 1 mg tablet   Yes No   Sig:  Take 1 tablet (1 mg) by mouth once daily.   cholecalciferol (Vitamin D-3) 50 MCG (2000 UT) tablet   Yes No   Sig: Take 1 tablet (2,000 Units) by mouth once daily.   cyanocobalamin (Vitamin B-12) 1,000 mcg tablet   Yes No   Sig: Take 1 tablet (1,000 mcg) by mouth once daily.   epoetin alejandra (Procrit) 10,000 unit/mL injection   Yes No   Sig: Pt gets every Monday.   fluticasone (Flonase) 50 mcg/actuation nasal spray   Yes No   Sig: Administer into affected nostril(s).   furosemide (Lasix) 20 mg tablet   No No   Sig: TAKE 1 TABLET BY MOUTH ONCE DAILY   glimepiride (Amaryl) 4 mg tablet   Yes No   Sig: Take 1 tablet (4 mg) by mouth once daily.   lansoprazole (Prevacid) 30 mg DR capsule   Yes No   Sig: Take 1 capsule (30 mg) by mouth once daily.   lisinopril 40 mg tablet   Yes No   Sig: Take 1 tablet (40 mg) by mouth once daily.   meclizine (Antivert) 12.5 mg tablet   No No   Sig: Take 1 tablet (12.5 mg) by mouth 2 times a day as needed for dizziness.   Patient not taking: Reported on 2/7/2024   metoprolol tartrate (Lopressor) 25 mg tablet   Yes No   Sig: Take by mouth.   pantoprazole (ProtoNix) 40 mg EC tablet   Yes No   Sig: Take 1 tablet (40 mg) by mouth once daily.   pioglitazone (Actos) 15 mg tablet   No No   Sig: Take 1 tablet (15 mg) by mouth once daily.   pravastatin (Pravachol) 40 mg tablet   No No   Sig: Take 1 tablet (40 mg) by mouth once daily at bedtime.   pregabalin (Lyrica) 100 mg capsule   No No   Sig: TAKE ONE CAPSULE BY MOUTH TWICE DAILY @9AM & 5PM      Facility-Administered Medications Last Administration Doses Remaining   sodium chloride 0.9% flush 10 mL None recorded           Reyna Sweeney CPhT

## 2024-02-08 NOTE — ED PROVIDER NOTES
HPI   Chief Complaint   Patient presents with    Weakness, Gen       Chief complaint: Weakness, malaise    History of present illness: Patient is a 78-year-old female with history of of chronic kidney disease COPD A-fib, diabetes presenting to the emergency department with complaints of malaise and weakness.  According to the patient, she has been feeling gradually worsening over the past 4 days.  The patient states that during this period of time she has had diffuse myalgias and has had increasing weakness.  The patient denies any fever.  The patient states that today she attempted to go to the bathroom and was unable to get up and go do so as result she called EMS the patient was then brought to the emergency department for further evaluation the patient denies any simran pain right now she has no other complaints at this time.      History provided by:  Patient   used: No                        Tas[place]em Coma Scale Score: 15                     Patient History   Past Medical History:   Diagnosis Date    Abnormal levels of other serum enzymes     Elevated liver enzymes    Acute kidney failure (CMS/HCC)     Anemia     CKD (chronic kidney disease)     COPD (chronic obstructive pulmonary disease) (CMS/HCC)     Disease of blood and blood-forming organs, unspecified     Bone marrow disorder    HLD (hyperlipidemia)     MDS (myelodysplastic syndrome) (CMS/HCC)     Personal history of other diseases of the musculoskeletal system and connective tissue     History of muscle pain    Personal history of other specified conditions     History of insomnia    Personal history of other specified conditions     History of edema    Type 2 diabetes mellitus (CMS/HCC)      Past Surgical History:   Procedure Laterality Date    APPENDECTOMY      BREAST BIOPSY Left     left excisional    BREAST LUMPECTOMY      COLONOSCOPY      HYSTERECTOMY      MR HEAD ANGIO WO IV CONTRAST  11/20/2020    MR HEAD ANGIO WO IV CONTRAST  11/20/2020 [facility]ICAL [facility]    MR NECK ANGIO WO IV CONTRAST  11/20/2020    MR NECK ANGIO WO IV CONTRAST 11/20/2020 [facility] CLINICAL LEGACY    OOPHORECTOMY      OTHER SURGICAL HISTORY  12/13/2019    Oophorectomy bilateral    OTHER SURGICAL HISTORY  12/13/2019    Tubal ligation    OTHER SURGICAL HISTORY  12/13/2019    Knee replacement    OTHER SURGICAL HISTORY  12/13/2019    Shoulder surgery    OTHER SURGICAL HISTORY  12/13/2019    Hysterectomy    OTHER SURGICAL HISTORY  12/13/2019    Lumpectomy    OTHER SURGICAL HISTORY  12/13/2019    Foot surgery    OTHER SURGICAL HISTORY  04/16/2021    Back surgery     No family history on file.  Social History     Tobacco Use    Smoking status: Never    Smokeless tobacco: Never   Vaping Use    Vaping Use: Never used   Substance Use Topics    Alcohol use: Not Currently    Drug use: Never       Physical Exam   ED Triage Vitals [02/07/24 2306]   Temperature Heart Rate Respirations BP   37 °C (98.6 °F) 87 (!) 21 163/57      Pulse Ox Temp Source Heart Rate Source Patient Position   100 % Oral Monitor --      BP Location FiO2 (%)     -- --       Physical Exam  Constitutional:       Appearance: Normal appearance.   HENT:      Head: Normocephalic and atraumatic.      Right Ear: External ear normal.      Left Ear: External ear normal.      Nose: Nose normal.      Mouth/Throat:      Mouth: Mucous membranes are moist.   Eyes:      General: Lids are normal.      Extraocular Movements: Extraocular movements intact.      Pupils: Pupils are equal, round, and reactive to light.   Cardiovascular:      Rate and Rhythm: Normal rate and regular rhythm.      Heart sounds: Normal heart sounds.   Pulmonary:      Effort: Pulmonary effort is normal.      Breath sounds: Normal breath sounds.   Abdominal:      General: Abdomen is flat.      Palpations: Abdomen is soft.      Tenderness: There is no abdominal tenderness. There is no guarding or rebound.   Musculoskeletal:         General: No deformity.  Normal range of motion.      Cervical back: Normal range of motion and neck supple.   Skin:     General: Skin is warm.      Capillary Refill: Capillary refill takes less than 2 seconds.      Coloration: Skin is not jaundiced.   Neurological:      General: No focal deficit present.      Mental Status: She is alert and oriented to person, place, and time.   Psychiatric:         Mood and Affect: Mood normal.         Behavior: Behavior normal.         ED Course & MDM   ED Course as of 02/18/24 2116   Thu Feb 08, 2024 0040 HEMOGLOBIN(!): 7.7 [BK]      ED Course User Index  [BK] Amando Huertas MD         Diagnoses as of 02/18/24 2116   Acute renal failure superimposed on chronic kidney disease, unspecified acute renal failure type, unspecified CKD stage (CMS/[facility]) - Creatinine 2.54, at base level, nephrology note appreciated   Hyperkalemia - improved       Medical Decision Making  Medical decision making: Patient remained stable throughout her time in the emergency department.  CBC demonstrated a hemoglobin of 7.7 and no other significant abnormalities.  The patient's Chem-7 demonstrated a potassium of 6.1 and a creatinine of 3.38 LFTs were all within normal limits.  Blood cultures were obtained.  The patient's urinalysis demonstrated 21-50 white cells but no other significant acute abnormalities.  Chest x-ray demonstrated low lung volumes with no other significant abnormality.  Review of the patient's EKG demonstrated a normal sinus rhythm with a rate of 84 bpm isoelectric ST segments narrow QRS complexes and a QTc of 432.    Patient presents to the emergency department with complaints of weakness.  Workup was performed as above and demonstrated the presence of urinary tract infection.  The patient workup also demonstrates an a hyperkalemia.  The patient was given IV hydration, single dose of Rocephin, as well as a dry acute hyperkalemic protocol including insulin with dextrose Kayexalate and an amp of bicarb.  Given  the patient's hyperkalemia weakness and urinary tract infection, the patient will require admission to the hospital for further evaluation and therapy.  I spoke with the hospitalist regarding the patient's case they agreed the patient could be admitted to their service for further evaluation and therapy.  The patient was admitted to the hospital in otherwise stable condition.    Amount and/or Complexity of Data Reviewed  Labs: ordered. Decision-making details documented in ED Course.  Radiology: ordered. Decision-making details documented in ED Course.  ECG/medicine tests: ordered and independent interpretation performed. Decision-making details documented in ED Course.        Procedure  Procedures     Amando Huertas MD  02/18/24 9711

## 2024-02-08 NOTE — CONSULTS
Nephrology Consult Note                                                                                                                                         Inpatient consult to Renal Care  Consult performed by: Pinky Christie DO  Consult ordered by: Warren Head MD                                                                                                               HPI  Patient is a 78 y.o. female who is admitted to hospital with complaints of   weakness. Nephrology consulted in view of ESTEE CKD stage IV.   Patient is well-known to me.  She has history of diabetic nephropathy.  She also has mild dysplastic syndrome and is followed closely by hematology.  Patient recently had blood transfusion and presented after she has weakness in her legs.  Patient was a bit tired when I came to talk to her so we did not talk much actually as she wanted to sleep.  Chart was reviewed.    Patient found to have potassium 6.1, creatinine 3.4.  Baseline creatinine has been 2.6-3.0  She has had poor intake recently.  Patient given potassium binder in the emergency department.  Patient was given IV fluids overnight      Past Medical History:   Diagnosis Date    Abnormal levels of other serum enzymes     Elevated liver enzymes    Acute kidney failure (CMS/HCC)     Anemia     CKD (chronic kidney disease)     COPD (chronic obstructive pulmonary disease) (CMS/HCC)     Disease of blood and blood-forming organs, unspecified     Bone marrow disorder    HLD (hyperlipidemia)     MDS (myelodysplastic syndrome) (CMS/HCC)     Personal history of other diseases of the musculoskeletal system and connective tissue     History of muscle pain    Personal history of other specified conditions     History of insomnia    Personal history of other specified conditions     History of edema    Type 2 diabetes  mellitus (CMS/HCC)       Social History     Socioeconomic History    Marital status:      Spouse name: Not on file    Number of children: Not on file    Years of education: Not on file    Highest education level: Not on file   Occupational History    Not on file   Tobacco Use    Smoking status: Never    Smokeless tobacco: Never   Vaping Use    Vaping Use: Never used   Substance and Sexual Activity    Alcohol use: Not Currently    Drug use: Never    Sexual activity: Not on file   Other Topics Concern    Not on file   Social History Narrative    Not on file     Social Determinants of Health     Financial Resource Strain: Low Risk  (2/8/2024)    Overall Financial Resource Strain (CARDIA)     Difficulty of Paying Living Expenses: Not hard at all   Food Insecurity: No Food Insecurity (10/10/2023)    Hunger Vital Sign     Worried About Running Out of Food in the Last Year: Never true     Ran Out of Food in the Last Year: Never true   Transportation Needs: No Transportation Needs (2/8/2024)    PRAPARE - Transportation     Lack of Transportation (Medical): No     Lack of Transportation (Non-Medical): No   Physical Activity: Not on file   Stress: Not on file   Social Connections: Not on file   Intimate Partner Violence: Not on file   Housing Stability: Unknown (2/8/2024)    Housing Stability Vital Sign     Unable to Pay for Housing in the Last Year: No     Number of Places Lived in the Last Year: Not on file     Unstable Housing in the Last Year: No      No family history on file.   Current Facility-Administered Medications on File Prior to Encounter   Medication Dose Route Frequency Provider Last Rate Last Admin    albuterol 2.5 mg /3 mL (0.083 %) nebulizer solution 3 mL  3 mL nebulization PRN Rosanne M Casal, APRN-CNP, DNP        dextrose 5 % in water (D5W) bolus  500 mL intravenous PRN Rosanne M Casal, APRN-CNP, DNP        diphenhydrAMINE (BENADryl) injection 50 mg  50 mg intravenous PRN Rosanne M Casal, APRN-CNP,  CATARINA        EPINEPHrine (Epipen) injection syringe 0.3 mg  0.3 mg intramuscular q5 min PRN Rosanne M Casal, APRN-CNP, DNP        famotidine PF (Pepcid) injection 20 mg  20 mg intravenous Once PRN Rosanne M Casal, APRN-CNP, DNP        methylPREDNISolone sod suc / (SOLU-Medrol) 40 mg injection 40 mg  40 mg intravenous PRN Rosanne M Casal, APRN-CNP, DNP        sodium chloride 0.9 % bolus 500 mL  500 mL intravenous PRN Rosanne M Casal, APRN-CNP, DNP         Current Outpatient Medications on File Prior to Encounter   Medication Sig Dispense Refill    acetaminophen (Tylenol) 500 mg tablet Take by mouth every 8 hours if needed.      albuterol 90 mcg/actuation inhaler Inhale 4 times a day as needed.      allopurinol (Zyloprim) 100 mg tablet Take 1 tablet (100 mg) by mouth once daily. 90 tablet 1    amLODIPine (Norvasc) 5 mg tablet Take 1 tablet (5 mg) by mouth once daily.      aspirin 81 mg EC tablet Take 1 tablet (81 mg) by mouth once daily.      bumetanide (Bumex) 1 mg tablet Take 1 tablet (1 mg) by mouth once daily.      cholecalciferol (Vitamin D-3) 50 MCG (2000 UT) tablet Take 1 tablet (2,000 Units) by mouth once daily.      cyanocobalamin (Vitamin B-12) 1,000 mcg tablet Take 1 tablet (1,000 mcg) by mouth once daily.      epoetin alejandra (Procrit) 10,000 unit/mL injection Pt gets every Monday.      fluticasone (Flonase) 50 mcg/actuation nasal spray Administer into affected nostril(s).      furosemide (Lasix) 20 mg tablet TAKE 1 TABLET BY MOUTH ONCE DAILY 7 tablet 0    glimepiride (Amaryl) 4 mg tablet Take 1 tablet (4 mg) by mouth once daily.      lansoprazole (Prevacid) 30 mg DR capsule Take 1 capsule (30 mg) by mouth once daily.      lisinopril 40 mg tablet Take 1 tablet (40 mg) by mouth once daily.      meclizine (Antivert) 12.5 mg tablet Take 1 tablet (12.5 mg) by mouth 2 times a day as needed for dizziness. (Patient not taking: Reported on 2/7/2024) 15 tablet 0    metoprolol tartrate (Lopressor) 25 mg tablet Take by  "mouth.      pantoprazole (ProtoNix) 40 mg EC tablet Take 1 tablet (40 mg) by mouth once daily.      pioglitazone (Actos) 15 mg tablet Take 1 tablet (15 mg) by mouth once daily. 90 tablet 0    pravastatin (Pravachol) 40 mg tablet Take 1 tablet (40 mg) by mouth once daily at bedtime. 90 tablet 0    pregabalin (Lyrica) 100 mg capsule TAKE ONE CAPSULE BY MOUTH TWICE DAILY @9AM & 5PM 60 capsule 0    SITagliptin phosphate (Januvia) 100 mg tablet Take 1 tablet (100 mg) by mouth once daily. 90 tablet 0      Scheduled medications  allopurinol, 100 mg, oral, Daily  amLODIPine, 5 mg, oral, Daily  aspirin, 81 mg, oral, Daily  fluticasone, 2 spray, Each Nostril, Daily  heparin (porcine), 5,000 Units, subcutaneous, q12h  metoprolol tartrate, 25 mg, oral, BID  pantoprazole, 40 mg, oral, Daily  polyethylene glycol, 17 g, oral, Daily  pravastatin, 40 mg, oral, Nightly  pregabalin, 100 mg, oral, BID  sodium zirconium cyclosilicate, 10 g, oral, TID      Continuous medications  dextrose 10 % in water (D10W), 50 mL/hr, Last Rate: 50 mL/hr (02/08/24 1530)  sodium chloride 0.9%, 100 mL/hr, Last Rate: Stopped (02/08/24 1217)      PRN medications  PRN medications: acetaminophen, acetaminophen, albuterol, ondansetron     Review of systems  as per HPI otherwise 10 point review systems negative    /70 (BP Location: Right arm, Patient Position: Lying)   Pulse 82   Temp 36.4 °C (97.5 °F) (Temporal)   Resp 18   Ht 1.575 m (5' 2\")   Wt 79.8 kg (176 lb)   SpO2 92%   BMI 32.19 kg/m²     Input / Output:  24 HR:   Intake/Output Summary (Last 24 hours) at 2/8/2024 1713  Last data filed at 2/8/2024 1530  Gross per 24 hour   Intake 425.83 ml   Output 300 ml   Net 125.83 ml       Physical Exam   Sleepy oriented x 3, NAD  EOMI  OP clear  Neck: supple, No JVD  CV: RRR without m/r/g  Lungs: CTA bilaterally  Abd: soft NT/ND +BS  Ext: No lower extremity edema   Neuro: grossly intact  Skin: no rashes    Results from last 7 days   Lab Units " 02/08/24  1532 02/08/24  0808 02/08/24  0548   SODIUM mmol/L 139 137 139   POTASSIUM mmol/L 5.0 6.0* 6.0*  6.0*   CHLORIDE mmol/L 110* 114* 113*   CO2 mmol/L 24 21 19*   BUN mg/dL 59* 64* 68*   CREATININE mg/dL 2.88* 3.19* 3.37*   GLUCOSE mg/dL 97 114* 121*   CALCIUM mg/dL 9.4 9.1 9.5        Results from last 7 days   Lab Units 02/08/24  1532 02/08/24  0548 02/07/24  2357   SODIUM mmol/L 139   < > 136   POTASSIUM mmol/L 5.0   < > 6.1*   CHLORIDE mmol/L 110*   < > 113*   CO2 mmol/L 24   < > 16*   BUN mg/dL 59*   < > 65*   CREATININE mg/dL 2.88*   < > 3.38*   CALCIUM mg/dL 9.4   < > 9.4   PROTEIN TOTAL g/dL  --   --  6.5   BILIRUBIN TOTAL mg/dL  --   --  0.4   ALK PHOS U/L  --   --  63   ALT U/L  --   --  9   AST U/L  --   --  13   GLUCOSE mg/dL 97   < > 133*    < > = values in this interval not displayed.           Results from last 7 days   Lab Units 02/07/24 2357 02/06/24  1121   WBC AUTO x10*3/uL 8.7 6.2   HEMOGLOBIN g/dL 7.7* 7.0*   HEMATOCRIT % 23.9* 22.8*   PLATELETS AUTO x10*3/uL 342 366        XR chest 2 views   Final Result   Low lung volumes with bronchovascular crowding.        MACRO:   None.        Signed by: Evan Finkelstein 2/8/2024 1:20 AM   Dictation workstation:   [id number]           Assessment:   Patient is 78 y.o. female who is admitted to hospital for weakness. Nephrology consulted in view of ESTEE on CKD.    Acute kidney injury on CKD stage IV  -Patient's baseline creatinine has been 2.6  -She does have a history of requiring dialysis in the past from December 2021 to March 2022.  -Current ESTEE is multifactorial-some prerenal from volume depletion on diuretics, acute on chronic anemia  -UA with no evidence of infection at this time  Creatinine on admission 3.5 and has trended to 2.9 with 1     Anemia-acute on chronic  -Related to chronic kidney disease and MDS  -She is on high-dose Procrit weekly  Recent transfusion     Hyperkalemia  -mild  -improved with potassium  binder  Hypertension  -controlled     Volume status-appears euvolemic     recs:     -No need for renal placement therapy  - diuretics are on hold but can be restarted as needed-no need today  -Will provide IV fluids  Renal panel in a.m.    Please message me through "[employer] chat with any questions or concerns.     Pinky Christie DO  2/8/2024  5:13 PM         [facility]    [address], [address]   [place] [address]  Office: [phone]

## 2024-02-08 NOTE — PROGRESS NOTES
02/08/24 1003   Discharge Planning   Living Arrangements Alone   Support Systems Children   Assistance Needed Independent with walker   Type of Residence Private residence   Number of Stairs to Enter Residence 0   Number of Stairs Within Residence 0   Home or Post Acute Services In home services   Type of Home Care Services Home nursing visits;Home OT;Home PT   Patient expects to be discharged to: Home   Does the patient need discharge transport arranged? Yes   RoundTrip coordination needed? Yes   Has discharge transport been arranged? No   Financial Resource Strain   How hard is it for you to pay for the very basics like food, housing, medical care, and heating? Not hard   Housing Stability   In the last 12 months, was there a time when you were not able to pay the mortgage or rent on time? N   In the last 12 months, was there a time when you did not have a steady place to sleep or slept in a shelter (including now)? N   Transportation Needs   In the past 12 months, has lack of transportation kept you from medical appointments or from getting medications? no   In the past 12 months, has lack of transportation kept you from meetings, work, or from getting things needed for daily living? No     PCP is Carlos Higgins. Patient is from home alone with support from her daughters. Patient lives in [place] with no steps. Patient states that she is independent with ambulation but within the last week she has needed to use a walker and has felt weaker. Patient is independent with self care, shopping, and driving. PT/ OT pending. Patient is open to SNF or HHC if recommended. TCC to follow.

## 2024-02-09 LAB
ANION GAP SERPL CALC-SCNC: 11 MMOL/L (ref 10–20)
BASO STIPL BLD QL SMEAR: PRESENT
BASOPHILS # BLD AUTO: 0.03 X10*3/UL (ref 0–0.1)
BASOPHILS NFR BLD AUTO: 0.6 %
BUN SERPL-MCNC: 50 MG/DL (ref 6–23)
CALCIUM SERPL-MCNC: 8.7 MG/DL (ref 8.6–10.3)
CHLORIDE SERPL-SCNC: 111 MMOL/L (ref 98–107)
CO2 SERPL-SCNC: 23 MMOL/L (ref 21–32)
CREAT SERPL-MCNC: 2.72 MG/DL (ref 0.5–1.05)
DACRYOCYTES BLD QL SMEAR: NORMAL
EGFRCR SERPLBLD CKD-EPI 2021: 17 ML/MIN/1.73M*2
EOSINOPHIL # BLD AUTO: 0.29 X10*3/UL (ref 0–0.4)
EOSINOPHIL NFR BLD AUTO: 5.4 %
ERYTHROCYTE [DISTWIDTH] IN BLOOD BY AUTOMATED COUNT: 26 % (ref 11.5–14.5)
GLUCOSE SERPL-MCNC: 106 MG/DL (ref 74–99)
HCT VFR BLD AUTO: 21.8 % (ref 36–46)
HGB BLD-MCNC: 7 G/DL (ref 12–16)
HYPOCHROMIA BLD QL SMEAR: NORMAL
IMM GRANULOCYTES # BLD AUTO: 0.01 X10*3/UL (ref 0–0.5)
IMM GRANULOCYTES NFR BLD AUTO: 0.2 % (ref 0–0.9)
LYMPHOCYTES # BLD AUTO: 1.75 X10*3/UL (ref 0.8–3)
LYMPHOCYTES NFR BLD AUTO: 32.5 %
MAGNESIUM SERPL-MCNC: 1.22 MG/DL (ref 1.6–2.4)
MCH RBC QN AUTO: 30.8 PG (ref 26–34)
MCHC RBC AUTO-ENTMCNC: 32.1 G/DL (ref 32–36)
MCV RBC AUTO: 96 FL (ref 80–100)
MONOCYTES # BLD AUTO: 0.57 X10*3/UL (ref 0.05–0.8)
MONOCYTES NFR BLD AUTO: 10.6 %
NEUTROPHILS # BLD AUTO: 2.74 X10*3/UL (ref 1.6–5.5)
NEUTROPHILS NFR BLD AUTO: 50.7 %
NRBC BLD-RTO: 0.4 /100 WBCS (ref 0–0)
OVALOCYTES BLD QL SMEAR: NORMAL
PLATELET # BLD AUTO: 294 X10*3/UL (ref 150–450)
POLYCHROMASIA BLD QL SMEAR: NORMAL
POTASSIUM SERPL-SCNC: 4.9 MMOL/L (ref 3.5–5.3)
RBC # BLD AUTO: 2.27 X10*6/UL (ref 4–5.2)
RBC MORPH BLD: NORMAL
SODIUM SERPL-SCNC: 140 MMOL/L (ref 136–145)
WBC # BLD AUTO: 5.4 X10*3/UL (ref 4.4–11.3)

## 2024-02-09 PROCEDURE — 97112 NEUROMUSCULAR REEDUCATION: CPT | Mod: GP

## 2024-02-09 PROCEDURE — 99233 SBSQ HOSP IP/OBS HIGH 50: CPT | Performed by: INTERNAL MEDICINE

## 2024-02-09 PROCEDURE — 83735 ASSAY OF MAGNESIUM: CPT | Performed by: INTERNAL MEDICINE

## 2024-02-09 PROCEDURE — 97535 SELF CARE MNGMENT TRAINING: CPT | Mod: GO

## 2024-02-09 PROCEDURE — 1200000002 HC GENERAL ROOM WITH TELEMETRY DAILY

## 2024-02-09 PROCEDURE — 36415 COLL VENOUS BLD VENIPUNCTURE: CPT | Performed by: INTERNAL MEDICINE

## 2024-02-09 PROCEDURE — 2500000004 HC RX 250 GENERAL PHARMACY W/ HCPCS (ALT 636 FOR OP/ED): Performed by: INTERNAL MEDICINE

## 2024-02-09 PROCEDURE — 2500000001 HC RX 250 WO HCPCS SELF ADMINISTERED DRUGS (ALT 637 FOR MEDICARE OP): Performed by: INTERNAL MEDICINE

## 2024-02-09 PROCEDURE — 80048 BASIC METABOLIC PNL TOTAL CA: CPT | Performed by: INTERNAL MEDICINE

## 2024-02-09 PROCEDURE — 2500000002 HC RX 250 W HCPCS SELF ADMINISTERED DRUGS (ALT 637 FOR MEDICARE OP, ALT 636 FOR OP/ED): Performed by: INTERNAL MEDICINE

## 2024-02-09 PROCEDURE — 97162 PT EVAL MOD COMPLEX 30 MIN: CPT | Mod: GP

## 2024-02-09 PROCEDURE — 85025 COMPLETE CBC W/AUTO DIFF WBC: CPT | Performed by: INTERNAL MEDICINE

## 2024-02-09 PROCEDURE — 97166 OT EVAL MOD COMPLEX 45 MIN: CPT | Mod: GO

## 2024-02-09 RX ORDER — MAGNESIUM SULFATE HEPTAHYDRATE 40 MG/ML
2 INJECTION, SOLUTION INTRAVENOUS ONCE
Status: COMPLETED | OUTPATIENT
Start: 2024-02-09 | End: 2024-02-09

## 2024-02-09 RX ADMIN — SODIUM ZIRCONIUM CYCLOSILICATE 10 G: 10 POWDER, FOR SUSPENSION ORAL at 09:15

## 2024-02-09 RX ADMIN — HEPARIN SODIUM 5000 UNITS: 5000 INJECTION INTRAVENOUS; SUBCUTANEOUS at 09:15

## 2024-02-09 RX ADMIN — MAGNESIUM SULFATE HEPTAHYDRATE 2 G: 40 INJECTION, SOLUTION INTRAVENOUS at 09:15

## 2024-02-09 RX ADMIN — PREGABALIN 100 MG: 50 CAPSULE ORAL at 09:14

## 2024-02-09 RX ADMIN — HEPARIN SODIUM 5000 UNITS: 5000 INJECTION INTRAVENOUS; SUBCUTANEOUS at 21:05

## 2024-02-09 RX ADMIN — AMLODIPINE BESYLATE 5 MG: 5 TABLET ORAL at 09:14

## 2024-02-09 RX ADMIN — ASPIRIN 81 MG: 81 TABLET, COATED ORAL at 09:15

## 2024-02-09 RX ADMIN — PRAVASTATIN SODIUM 40 MG: 40 TABLET ORAL at 21:05

## 2024-02-09 RX ADMIN — PANTOPRAZOLE SODIUM 40 MG: 40 TABLET, DELAYED RELEASE ORAL at 09:15

## 2024-02-09 RX ADMIN — METOPROLOL TARTRATE 25 MG: 25 TABLET, FILM COATED ORAL at 09:14

## 2024-02-09 RX ADMIN — ALLOPURINOL 100 MG: 100 TABLET ORAL at 09:14

## 2024-02-09 RX ADMIN — METOPROLOL TARTRATE 25 MG: 25 TABLET, FILM COATED ORAL at 21:05

## 2024-02-09 RX ADMIN — SODIUM ZIRCONIUM CYCLOSILICATE 10 G: 10 POWDER, FOR SUSPENSION ORAL at 21:05

## 2024-02-09 RX ADMIN — PREGABALIN 100 MG: 50 CAPSULE ORAL at 21:05

## 2024-02-09 ASSESSMENT — COGNITIVE AND FUNCTIONAL STATUS - GENERAL
EATING MEALS: A LITTLE
HELP NEEDED FOR BATHING: A LOT
STANDING UP FROM CHAIR USING ARMS: A LOT
MOVING TO AND FROM BED TO CHAIR: A LITTLE
MOVING TO AND FROM BED TO CHAIR: A LITTLE
PERSONAL GROOMING: A LITTLE
CLIMB 3 TO 5 STEPS WITH RAILING: A LOT
TOILETING: TOTAL
MOBILITY SCORE: 17
TOILETING: A LOT
WALKING IN HOSPITAL ROOM: A LOT
DRESSING REGULAR UPPER BODY CLOTHING: A LOT
STANDING UP FROM CHAIR USING ARMS: A LOT
WALKING IN HOSPITAL ROOM: A LOT
MOVING FROM LYING ON BACK TO SITTING ON SIDE OF FLAT BED WITH BEDRAILS: A LOT
HELP NEEDED FOR BATHING: A LOT
PERSONAL GROOMING: A LITTLE
DAILY ACTIVITIY SCORE: 14
HELP NEEDED FOR BATHING: A LITTLE
DAILY ACTIVITIY SCORE: 14
DRESSING REGULAR LOWER BODY CLOTHING: A LITTLE
WALKING IN HOSPITAL ROOM: A LOT
MOBILITY SCORE: 17
PERSONAL GROOMING: A LITTLE
TOILETING: TOTAL
DAILY ACTIVITIY SCORE: 17
WALKING IN HOSPITAL ROOM: A LOT
DRESSING REGULAR UPPER BODY CLOTHING: A LOT
MOVING TO AND FROM BED TO CHAIR: A LOT
DRESSING REGULAR LOWER BODY CLOTHING: A LOT
STANDING UP FROM CHAIR USING ARMS: A LOT
CLIMB 3 TO 5 STEPS WITH RAILING: A LOT
MOVING FROM LYING ON BACK TO SITTING ON SIDE OF FLAT BED WITH BEDRAILS: A LOT
CLIMB 3 TO 5 STEPS WITH RAILING: A LOT
MOBILITY SCORE: 13
TURNING FROM BACK TO SIDE WHILE IN FLAT BAD: A LITTLE
STANDING UP FROM CHAIR USING ARMS: A LOT
CLIMB 3 TO 5 STEPS WITH RAILING: A LOT
DRESSING REGULAR LOWER BODY CLOTHING: A LOT
TURNING FROM BACK TO SIDE WHILE IN FLAT BAD: A LITTLE
DRESSING REGULAR UPPER BODY CLOTHING: A LITTLE

## 2024-02-09 ASSESSMENT — PAIN SCALES - GENERAL
PAINLEVEL_OUTOF10: 0 - NO PAIN
PAINLEVEL_OUTOF10: 0 - NO PAIN
PAINLEVEL_OUTOF10: 10 - WORST POSSIBLE PAIN
PAINLEVEL_OUTOF10: 0 - NO PAIN
PAINLEVEL_OUTOF10: 10 - WORST POSSIBLE PAIN

## 2024-02-09 ASSESSMENT — ACTIVITIES OF DAILY LIVING (ADL)
LACK_OF_TRANSPORTATION: PATIENT DECLINED
BATHING_ASSISTANCE: MODERATE
HOME_MANAGEMENT_TIME_ENTRY: 10

## 2024-02-09 ASSESSMENT — PAIN - FUNCTIONAL ASSESSMENT
PAIN_FUNCTIONAL_ASSESSMENT: 0-10

## 2024-02-09 NOTE — PROGRESS NOTES
Social work consult placed for positive medical risk screen. SW reviewed pt's chart and communicated with TCC. No SW needs foreseen at this time. SW signing off; available upon request.    Luc Bland, MSW, LSW ([id number])   Care Transitions

## 2024-02-09 NOTE — CARE PLAN
Problem: Mobility  Goal: STG - Patient will ambulate  Description: FWW SBA USING PROEPR GAIT PATTERN 50 +FT  Outcome: Not Progressing  Goal: STRENGTHENING  Description: 20+ REPS RROM INCREASING STRENGTH TO STABILIZE GAIT  Outcome: Not Progressing     Problem: Transfers  Goal: STG - Patient to transfer to and from sit to supine  Description: SBA HOB FLAT NO RAIL  Outcome: Not Progressing  Goal: STG - Patient will transfer sit to and from stand  Description: FWW USING PROPER TECHNIQUE CGA  Outcome: Not Progressing

## 2024-02-09 NOTE — PROGRESS NOTES
"      Nephrology Progress Note      Nephrology following for ESTEE on CKD stage IV    -Patient is feeling a little better today  -Taking p.o. well  -States she has reactions like this every time she gets blood transfusion   she is up in chair today      /71 (BP Location: Left arm, Patient Position: Lying)   Pulse 70   Temp 36.7 °C (98.1 °F) (Temporal)   Resp 16   Ht 1.575 m (5' 2\")   Wt 76.9 kg (169 lb 8.5 oz)   SpO2 94%   BMI 31.01 kg/m²     Input / Output:  24 HR:   Intake/Output Summary (Last 24 hours) at 2/9/2024 1144  Last data filed at 2/9/2024 0300  Gross per 24 hour   Intake 1683.83 ml   Output 1250 ml   Net 433.83 ml       Physical Exam   Alert and oriented x 3 NAD  Neck: no JVD  CV: RRR  Lungs: CTA bilaterally  Abd: soft, NT, ND   Ext: no lower extremity edema    Scheduled medications  allopurinol, 100 mg, oral, Daily  amLODIPine, 5 mg, oral, Daily  aspirin, 81 mg, oral, Daily  fluticasone, 2 spray, Each Nostril, Daily  heparin (porcine), 5,000 Units, subcutaneous, q12h  metoprolol tartrate, 25 mg, oral, BID  pantoprazole, 40 mg, oral, Daily  polyethylene glycol, 17 g, oral, Daily  pravastatin, 40 mg, oral, Nightly  pregabalin, 100 mg, oral, BID  sodium zirconium cyclosilicate, 10 g, oral, TID      Continuous medications     PRN medications  PRN medications: acetaminophen, acetaminophen, albuterol, ondansetron   Results from last 7 days   Lab Units 02/09/24  0348 02/08/24  0548 02/07/24  2357   SODIUM mmol/L 140   < > 136   POTASSIUM mmol/L 4.9   < > 6.1*   CHLORIDE mmol/L 111*   < > 113*   CO2 mmol/L 23   < > 16*   BUN mg/dL 50*   < > 65*   CREATININE mg/dL 2.72*   < > 3.38*   CALCIUM mg/dL 8.7   < > 9.4   PROTEIN TOTAL g/dL  --   --  6.5   BILIRUBIN TOTAL mg/dL  --   --  0.4   ALK PHOS U/L  --   --  63   ALT U/L  --   --  9   AST U/L  --   --  13   GLUCOSE mg/dL 106*   < > 133*    < > = values in this interval not displayed.      Results from last 7 days   Lab Units 02/09/24  0348   MAGNESIUM " mg/dL 1.22*      Results from last 7 days   Lab Units 02/09/24  0348 02/07/24  2357 02/06/24  1121   WBC AUTO x10*3/uL 5.4 8.7 6.2   HEMOGLOBIN g/dL 7.0* 7.7* 7.0*   HEMATOCRIT % 21.8* 23.9* 22.8*   PLATELETS AUTO x10*3/uL 294 342 366        Assessment & Plan:     Patient is 78 y.o. female who is admitted to hospital for weakness. Nephrology consulted in view of ESTEE on CKD.    Acute kidney injury on CKD stage IV  -Patient's baseline creatinine has been 2.6  -She does have a history of requiring dialysis in the past from December 2021 to March 2022.  -Current ESTEE is multifactorial-some prerenal from volume depletion on diuretics, acute on chronic anemia  -UA with no evidence of infection at this time  Creatinine on admission 3.5 and has trended to 2.7     Anemia-acute on chronic  -Related to chronic kidney disease and MDS  -She is on high-dose Procrit weekly  Recent transfusion     Hyperkalemia  -mild  -improved with potassium binder  Hypertension  -controlled     Volume status-appears euvolemic   recs:     -No need for renal placement therapy  - diuretics are on hold but can be restarted as needed-not need today  -Patient will have ongoing follow-up in office for her stage IV CKD    Renal panel in a.m.       Please message me through [employer] chat with any questions or concerns.     Pinky Christie DO  2/9/2024  11:44 AM     [facility]    [address], [address]   [place] [address]  Office: [phone]

## 2024-02-09 NOTE — PROGRESS NOTES
Patient has been recommended for Skilled Nursing Facility. Provided skilled nursing list to patient from [facility] directory that includes facilities that are within  [facility], as well as meeting patient's medical needs, and are in-network for patient's insurance; while also in discharge geographic area patient prefers, and identifies each facilities CMS star rating.  Patient declining SNF placement. HHC list given from [facility]. Patient A[facility] is [facility].

## 2024-02-09 NOTE — CARE PLAN
The patient's goals for the shift include      The clinical goals for the shift include      Over the shift, the patient did not make progress toward the following goals. Barriers to progression include . Recommendations to address these barriers include .    Problem: Pain  Goal: My pain/discomfort is manageable  Outcome: Not Progressing     Problem: Safety  Goal: Patient will be injury free during hospitalization  Outcome: Not Progressing  Goal: I will remain free of falls  Outcome: Not Progressing     Problem: Daily Care  Goal: Daily care needs are met  Outcome: Not Progressing     Problem: Psychosocial Needs  Goal: Demonstrates ability to cope with hospitalization/illness  Outcome: Not Progressing  Goal: Collaborate with me, my family, and caregiver to identify my specific goals  Outcome: Not Progressing     Problem: Discharge Barriers  Goal: My discharge needs are met  Outcome: Not Progressing     Problem: Skin  Goal: Decreased wound size/increased tissue granulation at next dressing change  Outcome: Not Progressing  Goal: Participates in plan/prevention/treatment measures  Outcome: Not Progressing  Goal: Prevent/manage excess moisture  Outcome: Not Progressing  Goal: Prevent/minimize sheer/friction injuries  Outcome: Not Progressing  Goal: Promote/optimize nutrition  Outcome: Not Progressing  Goal: Promote skin healing  Outcome: Not Progressing     Problem: Fall/Injury  Goal: Not fall by end of shift  Outcome: Not Progressing  Goal: Be free from injury by end of the shift  Outcome: Not Progressing  Goal: Verbalize understanding of personal risk factors for fall in the hospital  Outcome: Not Progressing  Goal: Verbalize understanding of risk factor reduction measures to prevent injury from fall in the home  Outcome: Not Progressing  Goal: Use assistive devices by end of the shift  Outcome: Not Progressing  Goal: Pace activities to prevent fatigue by end of the shift  Outcome: Not Progressing

## 2024-02-09 NOTE — PROGRESS NOTES
Occupational Therapy    Evaluation    Patient Name: Tana Hargrove  MRN: [id number]  Today's Date: 2/9/2024  Time Calculation  Start Time: 1022  Stop Time: 1054  Time Calculation (min): 32 min        Assessment:  OT Assessment: OT eval completed.Pt  presents with impaired balance, impaired endurance and strength. Current level of assist is Min A x1 for mobility with FWW, MOD A for LB ADLs. Pt would benefit from further OT to ensure safe return to PLOF  Prognosis: Good  Barriers to Discharge: Decreased caregiver support (DTR limited availablity to assist)  Evaluation/Treatment Tolerance: Patient limited by fatigue  End of Session Patient Position: Up in chair, Alarm on (call light within reach, dtr present in room)  OT Assessment Results: Decreased ADL status, Decreased upper extremity strength, Decreased endurance, Decreased functional mobility  Prognosis: Good  Barriers to Discharge: Decreased caregiver support (DTR limited availablity to assist)  Evaluation/Treatment Tolerance: Patient limited by fatigue  Strengths: Insight into problems  Past Medical History:   Diagnosis Date    Abnormal levels of other serum enzymes     Elevated liver enzymes    Acute kidney failure (CMS/HCC)     Anemia     CKD (chronic kidney disease)     COPD (chronic obstructive pulmonary disease) (CMS/HCC)     Disease of blood and blood-forming organs, unspecified     Bone marrow disorder    HLD (hyperlipidemia)     MDS (myelodysplastic syndrome) (CMS/HCC)     Personal history of other diseases of the musculoskeletal system and connective tissue     History of muscle pain    Personal history of other specified conditions     History of insomnia    Personal history of other specified conditions     History of edema    Type 2 diabetes mellitus (CMS/HCC)      Past Surgical History:   Procedure Laterality Date    APPENDECTOMY      BREAST BIOPSY Left     left excisional    BREAST LUMPECTOMY      COLONOSCOPY      HYSTERECTOMY      MR HEAD ANGIO WO  IV CONTRAST  11/20/2020    MR HEAD ANGIO WO IV CONTRAST 11/20/2020 [facility]INICAL LEGAC[facility]    MR NECK ANGIO WO IV CONTRAST  11/20/2020    MR NECK ANGIO WO IV CONTRAST 11/20/2020 [facility] CLINICAL LEGACY    OOPHORECTOMY      OTHER SURGICAL HISTORY  12/13/2019    Oophorectomy bilateral    OTHER SURGICAL HISTORY  12/13/2019    Tubal ligation    OTHER SURGICAL HISTORY  12/13/2019    Knee replacement    OTHER SURGICAL HISTORY  12/13/2019    Shoulder surgery    OTHER SURGICAL HISTORY  12/13/2019    Hysterectomy    OTHER SURGICAL HISTORY  12/13/2019    Lumpectomy    OTHER SURGICAL HISTORY  12/13/2019    Foot surgery    OTHER SURGICAL HISTORY  04/16/2021    Back surgery       Plan:  Treatment Interventions: ADL retraining, Functional transfer training, UE strengthening/ROM, Endurance training, Patient/family training, Compensatory technique education, Equipment evaluation/education  OT Frequency: 3 times per week  OT Discharge Recommendations: Moderate intensity level of continued care  Equipment Recommended upon Discharge:  (TBD)  OT Recommended Transfer Status: Minimal assist, Assist of 1  OT - OK to Discharge: Yes (okay to dc to next olevel of care once medically clear)  Treatment Interventions: ADL retraining, Functional transfer training, UE strengthening/ROM, Endurance training, Patient/family training, Compensatory technique education, Equipment evaluation/education    Subjective   Current Problem:  1. Acute renal failure superimposed on chronic kidney disease, unspecified acute renal failure type, unspecified CKD stage (CMS/HCC)        2. Hyperkalemia        3. Urinary tract infection without hematuria, site unspecified          General:  General  Reason for Referral: Impaired ADLs and Functional Mobility (Dx:Acute renal failure superimposed on chronic kidney disease, unspecified acute renal failure type; CKD stage)  Referred By: Alon Viramontes MD  Past Medical History Relevant to Rehab: 78 y.o. female presenting with  "complaint of generalized weakness to the emergency room. (PMH: myelodysplastic syndrome, atrial fibrillation, heart failure preserved EF, coronary artery disease, COPD, hypertension, dyslipidemia, diabetes mellitus type 2, chronic kidney disease stage IV)  Family/Caregiver Present: Yes (DTR)  Caregiver Feedback: DTR left towards beginning of session and return towards end of session  Co-Treatment: PT  Co-Treatment Reason: Co - eval with PT to maximize safety with mobility while focusing on displine specific goals  Prior to Session Communication: Bedside nurse  Patient Position Received: Bed, 3 rail up, Alarm on (Gowned, External Cath, IV)  General Comment: Pt  alert and agreeable to therapy eval. Pt lying supine in bed  Precautions:  Medical Precautions: Fall precautions  Vital Signs:     Pain:  Pain Assessment  Pain Assessment: 0-10  Pain Score: 10 - Worst possible pain (0/10 at rest; 10/10 during mobility)  Pain Type:  (chronic foot pain, h/o of \" tenderness\" in bilteral feet and heels. Per pt. has been seeing an podiatrist for regular schedule visits to treat a callus on bottom of  right foot.)  Pain Location:  (bilateral feet/heels)  Pain Interventions: Rest  Response to Interventions: Pt reports 0/10 pain seated and supine position    Objective   Cognition:  Overall Cognitive Status: Within Functional Limits           Home Living:  Type of Home: Condo  Lives With: Alone  Home Adaptive Equipment:  (shower chair, RW)  Home Layout: One level  Home Access: No concerns  Bathroom Shower/Tub: Walk-in shower  Home Living Comments: Pt reports Independent with ADLs, microwave cooking only for meal prep, functional mobility with RW, (+)Drives. Dtr assist with  housekeeping. pt reports she completes laundry  Prior Function:     IADL History:     ADL:  Eating Assistance: Independent (Pt reporting she ate breakfast independently)  Grooming Assistance: Stand by (Simulated during UE AROM Screen)  Grooming Deficit: Increased " "time to complete  Bathing Assistance: Moderate (Anticipated)  Bathing Deficit: Supervision/safety, Increased time to complete , Steadying, Setup, Verbal cueing (Anticipated)  UE Dressing Assistance: Minimal (Simulated  during  UE AROM screen)  UE Dressing Deficit:  (chronic Left shoulder discomfort when using  LUE for lifting, pushing related tasks)  LE Dressing Assistance: Moderate (Seated at EOB Mod A to Max A for threading feet through underwear, Max A for pulling pants over hips, assist for steadying cues for correction of posture to upright with FWW for support)  LE Dressing Deficit: Increased time to complete, Steadying, Setup, Requires assistive device for steadying, Verbal cueing, Supervision/safety, Thread RLE into underwear, Thread LLE into underwear, Pull up over hips  Toileting Assistance with Device:  ((+) External cath. Anticipate Mod A  for hygiene and clothing mgmt. Max A for mgmt of clothing over hips and placement of purwick)  Activity Tolerance:  Endurance: Tolerates 10 - 20 min exercise with multiple rests  Bed Mobility/Transfers: Bed Mobility  Bed Mobility:  (supine <> sit with HOB elevated MIN A x1 cues for use of UE to assist with sidelying to sit)    Transfers  Transfer:  (Min A x1 x2 STS, bed to chair cues for  hand placement)      Ambulation/Gait Training:  Ambulation/Gait Training  Ambulation/Gait Training Performed:  (Pt performed short Household Distance amb with FWW MIN A x1)  Sitting Balance:  Static Sitting Balance  Static Sitting-Balance Support: Bilateral upper extremity supported, Feet supported  Static Sitting-Level of Assistance: Distant supervision  Standing Balance:  Static Standing Balance  Static Standing-Balance Support: Bilateral upper extremity supported  Static Standing-Level of Assistance: Minimum assistance   Modalities:     Vision:Vision - Basic Assessment  Current Vision: Wears glasses only for reading  Sensation:  Sensation Comment: Pt reports she has neuropathy, \" " "tenderness\" bilateral feet/heels baseline  Strength:  Strength Comments: No MMT applied to LUE, chronic shoulder discomfort with resistive activities per clinical observation apperas at 3/5. RUE  3(+)/5 per MMT grossly  Perception:     Coordination:      Hand Function:  Gross Grasp: Functional  Coordination: Functional  Extremities: RUE   RUE : Within Functional Limits and LUE   LUE: Within Functional Limits        Outcome Measures:[facility] Daily Activity  Putting on and taking off regular lower body clothing: A lot  Bathing (including washing, rinsing, drying): A lot  Putting on and taking off regular upper body clothing: A lot  Toileting, which includes using toilet, bedpan or urinal: Total ((+) external cath)  Taking care of personal grooming such as brushing teeth: A little  Eating Meals: None  Daily Activity - Total Score: 14        Education Documentation  Body Mechanics, taught by Yasmine Flores OT at 2/9/2024 11:56 AM.  Learner: Patient  Readiness: Acceptance  Method: Demonstration  Response: Needs Reinforcement    Precautions, taught by Yasmine Flores OT at 2/9/2024 11:56 AM.  Learner: Patient  Readiness: Acceptance  Method: Demonstration  Response: Needs Reinforcement    ADL Training, taught by Yasmine Flores OT at 2/9/2024 11:56 AM.  Learner: Patient  Readiness: Acceptance  Method: Demonstration  Response: Needs Reinforcement    Education Comments  No comments found.        OP EDUCATION:       Goals:  Encounter Problems       Encounter Problems (Active)       ADLs       Patient will perform UB and LB bathing  with contact guard assist level of assistance .       Start:  02/09/24    Expected End:  02/23/24            Patient with complete lower body dressing with contact guard assist level of assistance donning and doffing all LE clothes  with PRN adaptive equipment while edge of bed  and standing       Start:  02/09/24    Expected End:  02/23/24               TRANSFERS       Patient will complete sit to stand " transfer with contact guard assist level of assistance and front wheeled walker in order to improve safety and prepare for out of bed mobility.       Start:  02/09/24    Expected End:  02/23/24

## 2024-02-09 NOTE — PROGRESS NOTES
Physical Therapy    Physical Therapy Evaluation    Patient Name: Tana Hargrove  MRN: [id number]  Today's Date: 2/9/2024   Time Calculation  Start Time: 1023  Stop Time: 1058  Time Calculation (min): 35 min    Assessment/Plan   PT Assessment  PT Assessment Results: Decreased strength, Decreased endurance, Impaired balance, Decreased mobility, Impaired hearing, Pain, Decreased safety awareness, Impaired judgement  Rehab Prognosis: Fair  Evaluation/Treatment Tolerance: Patient limited by fatigue, Patient limited by pain  Strengths: Insight into problems  End of Session Communication: Bedside nurse  Assessment Comment:  (DECREASED SAFETY AWRENESS, DEREASED MAB STABILITY, FALLRISK NEEDS 24HR A FOR MOBILTIY SAFETY, NEEDS SKILLED REHAB ON DISCH, GIVE AL INFO)  End of Session Patient Position: Up in chair, Alarm on (DAUGHTER PRESENT AT END OF SESSION, CALL LIGHT IN REACH)  IP OR SWING BED PT PLAN  Inpatient or Swing Bed: Inpatient  PT Plan  Treatment/Interventions: Bed mobility, Transfer training, Gait training, Strengthening, Endurance training (REINFORCE MOBILITY SAFETY)  PT Plan: Skilled PT  PT Frequency: 4 times per week  PT Discharge Recommendations: Moderate intensity level of continued care  Equipment Recommended upon Discharge:  (TBD)  PT Recommended Transfer Status: Assist x1 (FWW)  PT - OK to Discharge: Yes (WHEN MEDICALLY CLEARED)      Subjective   General Visit Information:  General  Reason for Referral: IMPAIRED MOBILITY  Referred By: RYANNE  Past Medical History Relevant to Rehab: WEAK; DX: ESTEE/CKD, UTI, MDS, HYPERKALEMIA; HX: MDS, HF, CAD, COPD, HTN, DM, CKD, DIGNA TKS, 2 FALLS IN PAST 6 MO  Family/Caregiver Present: Yes  Caregiver Feedback: DTR left towards beginning of session and return towards end of session  Co-Treatment: OT  Co-Treatment Reason: FACILITATE MOBILITY SAFETY  Prior to Session Communication: Bedside nurse  Patient Position Received: Bed, 3 rail up, Alarm on (ROOM 2031 ALERT IV VISITING W/  DAUGHTER, PURE WICK, AGREEABLE TO  WORK W/ THERAPY)  Home Living:  Home Living  Home Living Comments:  (1LEVEL CONDO 0STE, ROLLATOR AMB, WALK IN SHOWER, SEAT, MICROWAVE MEALS, DOES LAUNDRY, FAMIY CLEANS AND DRIVES TO [employer], HAS LIFELINE)     Precautions:  Precautions  Hearing/Visual Limitations: [place]  Medical Precautions: Fall precautions       Objective   Pain:  Pain Assessment  Pain Score: 10 - Worst possible pain (IN FEET W/ WT)  Pain Interventions: Repositioned, Rest  Cognition:  Cognition  Overall Cognitive Status: Within Functional Limits  Safety/Judgement: Exceptions to WFL  Complex Functional Tasks: Moderate  Novel Situations: Moderate  Routine Tasks: Minimal  Unable to Self-Monitor and Self-Correct Consistently: Minimal  Insight: Moderate  Processing Speed: Delayed    General Assessments:  General Observation  General Observation:  (HAS DRESING ON R MTH- STATES SALLOUS HERE, FEELING HEELS -THEY ARE BEGINNING TO FEEL SOFT)               Activity Tolerance  Endurance: Tolerates 10 - 20 min exercise with multiple rests    Sensation  Sensation Comment:  (NEUROPATHY IN FEET)    Strength  Strength Comments: ROM LEGS WFL, DEMO'S 3/5 STRENGTH  Strength  Strength Comments: ROM LEGS WFL, DEMO'S 3/5 STRENGTH                     Static Sitting Balance  Static Sitting-Comment/Number of Minutes: FAIR  Dynamic Sitting Balance  Dynamic Sitting-Comments: FAIR/FAIR-    Static Standing Balance  Static Standing-Comment/Number of Minutes: FAIR-  Dynamic Standing Balance  Dynamic Standing-Comments: FAIR-  Functional Assessments:  Bed Mobility  Bed Mobility:  (HOB 30 DEGREES  MIN A TO EOB, USING RAILS, SITS EOB SBA, 5 MIN)    Transfers  Transfer:  (SIT<>STAND 2 REPS ONCE EACH FOR BED/CHAIR MIN A X1 VC FOR FWW SAFETY AS REACHES TO PULLON FWW TO STAND ADN PLOPS TO SIT)    Ambulation/Gait Training  Ambulation/Gait Training Performed:  (AMB 3 FT TO CHAIR FWW LEGS UNSTEADY, FATIGUED, PAIN IN FEET W/ WB)                      Outcome  Measures:  [facility] Basic Mobility  Turning from your back to your side while in a flat bed without using bedrails: A lot  Moving from lying on your back to sitting on the side of a flat bed without using bedrails: A little  Moving to and from bed to chair (including a wheelchair):  (VC FOR SAFETY)  Standing up from a chair using your arms (e.g. wheelchair or bedside chair): A lot (VC FOR SAFETY)  To walk in hospital room: A lot (VC FOR SAFETY)  Climbing 3-5 steps with railing: A lot    Encounter Problems       Encounter Problems (Active)       Mobility       STG - Patient will ambulate (Not Progressing)       Start:  02/09/24    Expected End:  02/23/24       FWW SBA USING PROEPR GAIT PATTERN 50 +FT         STRENGTHENING (Not Progressing)       Start:  02/09/24    Expected End:  03/01/24       20+ REPS RROM INCREASING STRENGTH TO STABILIZE GAIT            Pain - Adult          Transfers       STG - Patient to transfer to and from sit to supine (Not Progressing)       Start:  02/09/24    Expected End:  02/19/24       SBA HOB FLAT NO RAIL         STG - Patient will transfer sit to and from stand (Not Progressing)       Start:  02/09/24    Expected End:  02/22/24       FWW USING PROPER TECHNIQUE CGA                Education Documentation  Mobility Training, taught by Chantal Segundo PT at 2/9/2024 12:41 PM.  Learner: Patient  Readiness: Acceptance  Method: Explanation  Response: Needs Reinforcement  Comment: FWW SAFETY    Education Comments  No comments found.

## 2024-02-09 NOTE — NURSING NOTE
With rounds, patient asleep without distress noted.  IVF maintained. Call light and personal items within reach.

## 2024-02-09 NOTE — PROGRESS NOTES
Music Therapy Note    Tana Hargrove was referred by PX    Therapy Session  Referral Type: New referral this admission  Visit Type: New visit  Session Start Time: 1029  Session End Time: 1029  Intervention Delivery: In-person  Conflict of Service: Working with other staff               Treatment/Interventions       Post-assessment  Total Session Time (min): 0 minutes    Narrative  Assessment Detail: Pt seen in room working with staff at time of rounding.  Plan: Music therapy was referred by patient experience advocate. MT attempted to provide MT services.  Intervention: Pt working with staff.  Evaluation: NA  Follow-up: Will follow up at a later time.    Education Documentation  No documentation found.

## 2024-02-09 NOTE — PROGRESS NOTES
Tana Hargrove is a 78 y.o. female on day 1 of admission presenting with Acute renal failure superimposed on chronic kidney disease, unspecified acute renal failure type, unspecified CKD stage (CMS/[facility]).      Subjective   Tana Hargrove is a 78 y.o. female with past medical history of myelodysplastic syndrome, atrial fibrillation, heart failure preserved EF, coronary artery disease, COPD, hypertension, dyslipidemia, diabetes mellitus type 2, chronic kidney disease stage IV presenting with complaint of generalized weakness to the emergency room.  Patient states that she is having difficult time ambulating.  Patient states that she normally gets around with a walker but had a blood transfusion yesterday for myelodysplastic disease and now states that her legs are just too weak to ambulate. Lab data shows patient's glucose 133 patient's potassium is 6.1 with a blood of 16 BUN is 65 creatinine of 3.38 baseline creatinine is approximately 2.6-3. Patient does admit to poor oral intake states she just does not have an appetite last few days. CBC shows 8700 white count. H&H is 7.7 and 23.9 with baseline hemoglobin typically hovering in the 7-8 range. Patient's urinalysis has 21-50 WBCs with complaints of dysuria. Patient's potassium level is 6.1. Did receive dextrose and insulin, sodium bicarb, Kayexalate, in the emergency room but potassium delayed diminished to 6.0.     2/8: No acute events overnight. Patient sleeping comfortably this morning. Vitals stable. Endorses continued weakness, dry mouth, and fatigue. Denies any dysuria. Complains of increased urinary frequency.  Patient given another round of medication for hyperkalemia after that potassium did come down to 5.0.  Creatinine is down to 2.88.  Renal input and help is appreciated.     2/9: No acute events overnight. Patient sleeping comfortably this morning. Vitals stable. Endorses continued weakness, dry mouth, and fatigue. New L heel discomfort today. Seen by OT  with recs for moderate intensity level of continued care. Waiting for PT evaluation. H and H at patient's baseline. Hyperkalemia resolved, at 4.9 today. Cr near baseline at 2.72. Nephro Dc'd antibiotics, cultures pending. Dr. Christie planning for outpatient follow up.     Addendum: PT and OT working on discharge planning.  Patient has some ambulatory dysfunction chronically because of what I suspect was previous plantar fascia rupture.  Patient also with callus on the right foot with ulceration there to be seen by podiatry.  As soon as patient is ambulatory enough she can be discharged home with outpatient follow-up.  Patient will be seen by my associate Dr. Gaines tomorrow.        Objective     Last Recorded Vitals  /71 (BP Location: Right arm, Patient Position: Sitting)   Pulse 109   Temp 36.8 °C (98.2 °F) (Temporal)   Resp 16   Wt 76.9 kg (169 lb 8.5 oz)   SpO2 (!) 87%   Intake/Output last 3 Shifts:    Intake/Output Summary (Last 24 hours) at 2/9/2024 1215  Last data filed at 2/9/2024 0300  Gross per 24 hour   Intake 1683.83 ml   Output 1250 ml   Net 433.83 ml       Admission Weight  Weight: 79.8 kg (176 lb) (02/07/24 2306)    Daily Weight  02/09/24 : 76.9 kg (169 lb 8.5 oz)    Image Results  US renal complete  Narrative: Interpreted By:  Jayy Hu,   STUDY:  US RENAL COMPLETE;  2/8/2024 11:26 am      INDICATION:  Signs/Symptoms:Acute kidney injury concern about obstruction.      COMPARISON:  None.      ACCESSION NUMBER(S):  [id number]      ORDERING CLINICIAN:  JOSSUE LERMA      TECHNIQUE:  Multiple images of the kidneys were obtained  , with color Doppler  for blood flow.      FINDINGS:  RIGHT KIDNEY:  The right kidney measures 10.3 cm in length.  There is moderate  thinning of the cortex indicating atrophy. The cortex is also  increased echogenicity probably from medical renal disease.. An  approximate 1.1 cm cortical cyst is present at the mid/upper pole. No  hydronephrosis or evidence of  nephrolithiasis. Color Doppler  demonstrates renal blood flow.      LEFT KIDNEY:  The left kidney measures 11.3 cm in length. Again there is cortical  atrophy with increased echogenicity suggesting medical renal  disease..   No hydronephrosis or evidence of nephrolithiasis.. Color  Doppler demonstrates renal blood flow.      BLADDER:  Urinary bladder contour is smooth. There appears to be layering of  some debris posteriorly.      OTHER FINDINGS:  None significant.      Impression: Cortical atrophy and probable medical renal disease.      Signed by: Jayy Hu 2/8/2024 12:44 PM  Dictation workstation:   [id number]  ECG 12 lead  Sinus rhythm  XR chest 2 views  Narrative: Interpreted By:  Finkelstein, Evan,   STUDY:  XR CHEST 2 VIEWS;  2/8/2024 1:12 am      INDICATION:  Signs/Symptoms:SOB. Rales RLL.      COMPARISON:  Chest radiograph 08/14/2023      ACCESSION NUMBER(S):  [id number]      ORDERING CLINICIAN:  DAHIANA DALEY      FINDINGS:  Low lung volumes with bronchovascular crowding.      CARDIOMEDIASTINAL SILHOUETTE:  Cardiomediastinal silhouette is normal in size and configuration.      LUNGS:  No pulmonary consolidation, pleural effusion or pneumothorax.      ABDOMEN:  No remarkable upper abdominal findings.      BONES:  No acute osseous abnormality.      Impression: Low lung volumes with bronchovascular crowding.      MACRO:  None.      Signed by: Evan Finkelstein 2/8/2024 1:20 AM  Dictation workstation:   [id number]      Physical Exam  Constitutional:       General: She is not in acute distress.  HENT:      Head: Normocephalic and atraumatic.      Mouth/Throat:      Mouth: Mucous membranes are dry.   Cardiovascular:      Rate and Rhythm: Normal rate and regular rhythm.      Heart sounds: Murmur heard.   Pulmonary:      Effort: Pulmonary effort is normal.      Breath sounds: Normal breath sounds.   Abdominal:      General: Abdomen is flat.      Palpations: Abdomen is soft.   Musculoskeletal:      Right  lower leg: No edema.      Left lower leg: No edema.   Skin:     General: Skin is warm and dry.      Findings: Wound present.      Comments: Plantar surface of R first metatarsal 2cm circumferential pressure ulcer. Beginnings of L heel pressure wound.    Neurological:      General: No focal deficit present.      Motor: Weakness present.         Relevant Results  Results for orders placed or performed during the hospital encounter of 02/07/24 (from the past 24 hour(s))   Basic metabolic panel   Result Value Ref Range    Glucose 97 74 - 99 mg/dL    Sodium 139 136 - 145 mmol/L    Potassium 5.0 3.5 - 5.3 mmol/L    Chloride 110 (H) 98 - 107 mmol/L    Bicarbonate 24 21 - 32 mmol/L    Anion Gap 10 10 - 20 mmol/L    Urea Nitrogen 59 (H) 6 - 23 mg/dL    Creatinine 2.88 (H) 0.50 - 1.05 mg/dL    eGFR 16 (L) >60 mL/min/1.73m*2    Calcium 9.4 8.6 - 10.3 mg/dL   CBC and Auto Differential   Result Value Ref Range    WBC 5.4 4.4 - 11.3 x10*3/uL    nRBC 0.4 (H) 0.0 - 0.0 /100 WBCs    RBC 2.27 (L) 4.00 - 5.20 x10*6/uL    Hemoglobin 7.0 (L) 12.0 - 16.0 g/dL    Hematocrit 21.8 (L) 36.0 - 46.0 %    MCV 96 80 - 100 fL    MCH 30.8 26.0 - 34.0 pg    MCHC 32.1 32.0 - 36.0 g/dL    RDW 26.0 (H) 11.5 - 14.5 %    Platelets 294 150 - 450 x10*3/uL    Neutrophils % 50.7 40.0 - 80.0 %    Immature Granulocytes %, Automated 0.2 0.0 - 0.9 %    Lymphocytes % 32.5 13.0 - 44.0 %    Monocytes % 10.6 2.0 - 10.0 %    Eosinophils % 5.4 0.0 - 6.0 %    Basophils % 0.6 0.0 - 2.0 %    Neutrophils Absolute 2.74 1.60 - 5.50 x10*3/uL    Immature Granulocytes Absolute, Automated 0.01 0.00 - 0.50 x10*3/uL    Lymphocytes Absolute 1.75 0.80 - 3.00 x10*3/uL    Monocytes Absolute 0.57 0.05 - 0.80 x10*3/uL    Eosinophils Absolute 0.29 0.00 - 0.40 x10*3/uL    Basophils Absolute 0.03 0.00 - 0.10 x10*3/uL   Basic Metabolic Panel   Result Value Ref Range    Glucose 106 (H) 74 - 99 mg/dL    Sodium 140 136 - 145 mmol/L    Potassium 4.9 3.5 - 5.3 mmol/L    Chloride 111 (H) 98 -  107 mmol/L    Bicarbonate 23 21 - 32 mmol/L    Anion Gap 11 10 - 20 mmol/L    Urea Nitrogen 50 (H) 6 - 23 mg/dL    Creatinine 2.72 (H) 0.50 - 1.05 mg/dL    eGFR 17 (L) >60 mL/min/1.73m*2    Calcium 8.7 8.6 - 10.3 mg/dL   Magnesium   Result Value Ref Range    Magnesium 1.22 (L) 1.60 - 2.40 mg/dL   Morphology   Result Value Ref Range    RBC Morphology See Below     Polychromasia Mild     Hypochromia Mild     Ovalocytes Few     Teardrop Cells Few     Basophilic Stippling Present      *Note: Due to a large number of results and/or encounters for the requested time period, some results have not been displayed. A complete set of results can be found in Results Review.        Assessment/Plan     Acute renal failure superimposed on chronic kidney disease  Hyperkalemia 2/2 acute renal failure  Myelodysplastic syndrome  Generalized weakness  Severe muscle weakness  Deconditioning  Pressure ulcer of R first metatarsal  Ambulatory dysfunction  History left plantar fascia injury  Hypomagnesemia  HTN  DM 2  CAD  A fib  DLD  COPD    Lokelma 10g q8 started  IV magnesium supplementation  Hold ACE-I and diuretics  Nephrology consulting  Podiatry consult placed  X1 recent RBC transfusion  Insulin coverage with sliding scale  PT/OT evaluation  DVT prophylaxis  Daily labs  See orders for complete plan    Gregoria Solano  Shared visit with student  Patient seen and examined  Note amended and addended  See my orders for complete plan

## 2024-02-10 LAB
ANION GAP SERPL CALC-SCNC: 11 MMOL/L (ref 10–20)
ATRIAL RATE: 84 BPM
BACTERIA UR CULT: ABNORMAL
BASO STIPL BLD QL SMEAR: PRESENT
BASOPHILS # BLD AUTO: 0.04 X10*3/UL (ref 0–0.1)
BASOPHILS NFR BLD AUTO: 0.6 %
BUN SERPL-MCNC: 49 MG/DL (ref 6–23)
CALCIUM SERPL-MCNC: 9.2 MG/DL (ref 8.6–10.3)
CHLORIDE SERPL-SCNC: 110 MMOL/L (ref 98–107)
CO2 SERPL-SCNC: 22 MMOL/L (ref 21–32)
CREAT SERPL-MCNC: 2.54 MG/DL (ref 0.5–1.05)
DACRYOCYTES BLD QL SMEAR: NORMAL
EGFRCR SERPLBLD CKD-EPI 2021: 19 ML/MIN/1.73M*2
EOSINOPHIL # BLD AUTO: 0.24 X10*3/UL (ref 0–0.4)
EOSINOPHIL NFR BLD AUTO: 3.8 %
ERYTHROCYTE [DISTWIDTH] IN BLOOD BY AUTOMATED COUNT: 25.2 % (ref 11.5–14.5)
GLUCOSE SERPL-MCNC: 126 MG/DL (ref 74–99)
HCT VFR BLD AUTO: 23 % (ref 36–46)
HGB BLD-MCNC: 7.2 G/DL (ref 12–16)
HYPOCHROMIA BLD QL SMEAR: NORMAL
IMM GRANULOCYTES # BLD AUTO: 0.07 X10*3/UL (ref 0–0.5)
IMM GRANULOCYTES NFR BLD AUTO: 1.1 % (ref 0–0.9)
LYMPHOCYTES # BLD AUTO: 1.32 X10*3/UL (ref 0.8–3)
LYMPHOCYTES NFR BLD AUTO: 21 %
MAGNESIUM SERPL-MCNC: 1.65 MG/DL (ref 1.6–2.4)
MCH RBC QN AUTO: 30.3 PG (ref 26–34)
MCHC RBC AUTO-ENTMCNC: 31.3 G/DL (ref 32–36)
MCV RBC AUTO: 97 FL (ref 80–100)
MONOCYTES # BLD AUTO: 0.58 X10*3/UL (ref 0.05–0.8)
MONOCYTES NFR BLD AUTO: 9.2 %
NEUTROPHILS # BLD AUTO: 4.04 X10*3/UL (ref 1.6–5.5)
NEUTROPHILS NFR BLD AUTO: 64.3 %
NRBC BLD-RTO: 0.5 /100 WBCS (ref 0–0)
OVALOCYTES BLD QL SMEAR: NORMAL
P AXIS: 39 DEGREES
PLATELET # BLD AUTO: 314 X10*3/UL (ref 150–450)
POLYCHROMASIA BLD QL SMEAR: NORMAL
POTASSIUM SERPL-SCNC: 4.2 MMOL/L (ref 3.5–5.3)
PR INTERVAL: 168 MS
Q ONSET: 249 MS
QRS COUNT: 14 BEATS
QRS DURATION: 83 MS
QT INTERVAL: 365 MS
QTC CALCULATION(BAZETT): 432 MS
QTC FREDERICIA: 408 MS
R AXIS: 12 DEGREES
RBC # BLD AUTO: 2.38 X10*6/UL (ref 4–5.2)
RBC MORPH BLD: NORMAL
SODIUM SERPL-SCNC: 139 MMOL/L (ref 136–145)
T AXIS: 38 DEGREES
T OFFSET: 432 MS
VENTRICULAR RATE: 84 BPM
WBC # BLD AUTO: 6.3 X10*3/UL (ref 4.4–11.3)

## 2024-02-10 PROCEDURE — 1200000002 HC GENERAL ROOM WITH TELEMETRY DAILY

## 2024-02-10 PROCEDURE — 2500000002 HC RX 250 W HCPCS SELF ADMINISTERED DRUGS (ALT 637 FOR MEDICARE OP, ALT 636 FOR OP/ED): Performed by: INTERNAL MEDICINE

## 2024-02-10 PROCEDURE — 99232 SBSQ HOSP IP/OBS MODERATE 35: CPT | Performed by: INTERNAL MEDICINE

## 2024-02-10 PROCEDURE — 2500000004 HC RX 250 GENERAL PHARMACY W/ HCPCS (ALT 636 FOR OP/ED): Performed by: INTERNAL MEDICINE

## 2024-02-10 PROCEDURE — 97116 GAIT TRAINING THERAPY: CPT | Mod: CQ,GP

## 2024-02-10 PROCEDURE — 83735 ASSAY OF MAGNESIUM: CPT | Performed by: INTERNAL MEDICINE

## 2024-02-10 PROCEDURE — 97535 SELF CARE MNGMENT TRAINING: CPT | Mod: GO

## 2024-02-10 PROCEDURE — 85025 COMPLETE CBC W/AUTO DIFF WBC: CPT | Performed by: INTERNAL MEDICINE

## 2024-02-10 PROCEDURE — 80048 BASIC METABOLIC PNL TOTAL CA: CPT | Performed by: INTERNAL MEDICINE

## 2024-02-10 PROCEDURE — 2500000001 HC RX 250 WO HCPCS SELF ADMINISTERED DRUGS (ALT 637 FOR MEDICARE OP): Performed by: INTERNAL MEDICINE

## 2024-02-10 PROCEDURE — 36415 COLL VENOUS BLD VENIPUNCTURE: CPT | Performed by: INTERNAL MEDICINE

## 2024-02-10 RX ADMIN — METOPROLOL TARTRATE 25 MG: 25 TABLET, FILM COATED ORAL at 08:25

## 2024-02-10 RX ADMIN — HEPARIN SODIUM 5000 UNITS: 5000 INJECTION INTRAVENOUS; SUBCUTANEOUS at 20:38

## 2024-02-10 RX ADMIN — PREGABALIN 100 MG: 50 CAPSULE ORAL at 08:25

## 2024-02-10 RX ADMIN — HEPARIN SODIUM 5000 UNITS: 5000 INJECTION INTRAVENOUS; SUBCUTANEOUS at 08:25

## 2024-02-10 RX ADMIN — AMLODIPINE BESYLATE 5 MG: 5 TABLET ORAL at 08:25

## 2024-02-10 RX ADMIN — PREGABALIN 100 MG: 50 CAPSULE ORAL at 20:38

## 2024-02-10 RX ADMIN — ASPIRIN 81 MG: 81 TABLET, COATED ORAL at 09:00

## 2024-02-10 RX ADMIN — POLYETHYLENE GLYCOL 3350 17 G: 17 POWDER, FOR SOLUTION ORAL at 08:25

## 2024-02-10 RX ADMIN — PANTOPRAZOLE SODIUM 40 MG: 40 TABLET, DELAYED RELEASE ORAL at 08:25

## 2024-02-10 RX ADMIN — METOPROLOL TARTRATE 25 MG: 25 TABLET, FILM COATED ORAL at 20:38

## 2024-02-10 RX ADMIN — ALLOPURINOL 100 MG: 100 TABLET ORAL at 08:25

## 2024-02-10 RX ADMIN — PRAVASTATIN SODIUM 40 MG: 40 TABLET ORAL at 20:38

## 2024-02-10 ASSESSMENT — COGNITIVE AND FUNCTIONAL STATUS - GENERAL
HELP NEEDED FOR BATHING: A LOT
MOVING FROM LYING ON BACK TO SITTING ON SIDE OF FLAT BED WITH BEDRAILS: A LITTLE
TOILETING: A LITTLE
HELP NEEDED FOR BATHING: A LOT
DRESSING REGULAR LOWER BODY CLOTHING: A LOT
DRESSING REGULAR UPPER BODY CLOTHING: A LITTLE
WALKING IN HOSPITAL ROOM: A LITTLE
MOBILITY SCORE: 17
WALKING IN HOSPITAL ROOM: A LITTLE
TURNING FROM BACK TO SIDE WHILE IN FLAT BAD: A LITTLE
PERSONAL GROOMING: A LITTLE
CLIMB 3 TO 5 STEPS WITH RAILING: A LOT
MOVING TO AND FROM BED TO CHAIR: A LITTLE
MOBILITY SCORE: 19
STANDING UP FROM CHAIR USING ARMS: A LITTLE
HELP NEEDED FOR BATHING: A LOT
MOVING FROM LYING ON BACK TO SITTING ON SIDE OF FLAT BED WITH BEDRAILS: A LITTLE
MOBILITY SCORE: 17
WALKING IN HOSPITAL ROOM: A LITTLE
MOVING TO AND FROM BED TO CHAIR: A LITTLE
DRESSING REGULAR LOWER BODY CLOTHING: A LOT
TOILETING: A LITTLE
PERSONAL GROOMING: A LITTLE
DRESSING REGULAR UPPER BODY CLOTHING: A LITTLE
TOILETING: A LITTLE
DAILY ACTIVITIY SCORE: 17
DRESSING REGULAR UPPER BODY CLOTHING: A LITTLE
STANDING UP FROM CHAIR USING ARMS: A LITTLE
MOVING TO AND FROM BED TO CHAIR: A LITTLE
CLIMB 3 TO 5 STEPS WITH RAILING: A LOT
TURNING FROM BACK TO SIDE WHILE IN FLAT BAD: A LITTLE
STANDING UP FROM CHAIR USING ARMS: A LITTLE
DRESSING REGULAR LOWER BODY CLOTHING: A LOT
DAILY ACTIVITIY SCORE: 17
CLIMB 3 TO 5 STEPS WITH RAILING: A LOT
DAILY ACTIVITIY SCORE: 17
PERSONAL GROOMING: A LITTLE

## 2024-02-10 ASSESSMENT — PAIN - FUNCTIONAL ASSESSMENT
PAIN_FUNCTIONAL_ASSESSMENT: 0-10
PAIN_FUNCTIONAL_ASSESSMENT: 0-10

## 2024-02-10 ASSESSMENT — PAIN SCALES - GENERAL
PAINLEVEL_OUTOF10: 0 - NO PAIN
PAINLEVEL_OUTOF10: 0 - NO PAIN

## 2024-02-10 ASSESSMENT — ACTIVITIES OF DAILY LIVING (ADL): HOME_MANAGEMENT_TIME_ENTRY: 27

## 2024-02-10 NOTE — PROGRESS NOTES
Occupational Therapy    OT Treatment    Patient Name: Tana Hargrove  MRN: [id number]  Today's Date: 2/10/2024  Time Calculation  Start Time: 1024  Stop Time: 1051  Time Calculation (min): 27 min         Assessment:  Prognosis: Good  Barriers to Discharge: Decreased caregiver support  Evaluation/Treatment Tolerance: Patient tolerated treatment well  End of Session Communication: Bedside nurse  End of Session Patient Position: Up in chair, Alarm on (needs in reach, Pt verbalized and demonstrated understanding of use of call light prior to completing any functional mobility)  Prognosis: Good  Barriers to Discharge: Decreased caregiver support  Evaluation/Treatment Tolerance: Patient tolerated treatment well  Pt with increased activity tolerance and participation in ADLs this date. Good progress toward goals     Plan:   Treatment Interventions: ADL retraining, Functional transfer training, UE strengthening/ROM, Endurance training, Patient/family training, Compensatory technique education, Equipment evaluation/education  OT Frequency: 3 times per week  OT Discharge Recommendations: Moderate intensity level of continued care  Equipment Recommended upon Discharge:  (TBD)  OT Recommended Transfer Status: Minimal assist, Assist of 1  OT - OK to Discharge: Yes (okay to dc to next olevel of care once medically clear)  Treatment Interventions: ADL retraining, Functional transfer training, UE strengthening/ROM, Endurance training, Patient/family training, Compensatory technique education, Equipment evaluation/education     Subjective     Current Problem:  Patient Active Problem List   Diagnosis    Lumbago    A-fib (CMS/[facility])    Anxiety    Chronic diastolic heart failure of unknown etiology (CMS/[facility])    Cervical spondylosis without myelopathy    Coronary artery disease    Degeneration of intervertebral disc of lumbar region    Hypertension, essential    Frequent falls    GERD (gastroesophageal reflux disease)    Hyperlipidemia     Inability to ambulate due to multiple joints    Jerking movements of extremities    Spinal stenosis of lumbar region    Lumbar spondylosis    Macrocytic anemia    Myelodysplastic syndrome (CMS/HCC)    Myofascial pain syndrome    Occasional tremors    Osteoporosis    Stage 4 chronic kidney disease (CMS/[facility])    Weakness generalized    Gout    Scoliosis of lumbar region due to degenerative disease of spine in adult    Depression    Lower extremity edema    Embolism (CMS/[facility])    Type 2 diabetes mellitus with stage 4 chronic kidney disease, without long-term current use of insulin (CMS/[facility])    COPD without exacerbation (CMS/[facility])    Primary osteoarthritis of right knee    Fall    Acute kidney failure, unspecified (CMS/[facility])    Muscle weakness (generalized)    Primary osteoarthritis    Repeated falls    Paroxysmal atrial fibrillation (CMS/[facility])    Chronic diastolic (congestive) heart failure (CMS/[facility])    Chronic obstructive pulmonary disease, unspecified (CMS/[facility])    CKD (chronic kidney disease)    Anemia, unspecified    Essential (primary) hypertension    Hyperlipidemia, unspecified    Type 2 diabetes mellitus with hyperglycemia, without long-term current use of insulin (CMS/[facility])    Type 2 diabetes mellitus without complications (CMS/[facility])    Obesity (BMI 30-39.9)    Acute renal failure superimposed on chronic kidney disease, unspecified acute renal failure type, unspecified CKD stage (CMS/[facility])       General:  OT Received On: 02/10/24  Prior to Session Communication: Bedside nurse  Patient Position Received: Bed, 3 rail up, Alarm on  General Comment: Pt  alert and agreeable to therapy eval. Pt lying supine in bed      Pain:  Pain Assessment  Pain Assessment: 0-10  Pain Score: 0 - No pain  Objective      Activities of Daily Living: Feeding  Feeding Level of Assistance: Modified independent  Grooming  Grooming Level of Assistance: Close supervision (standing at the sink for oral care and hand washing - set up seated in chair  with arms to wash face and comb hair) Pt stood at the sink ~5 min with BUE support on sink and close SBA         UE Dressing  UE Dressing Level of Assistance: Minimum assistance  LE Dressing  LE Dressing: Yes  Sock Level of Assistance: Moderate assistance  Toileting  Toileting Level of Assistance: Minimum assistance (for balance during clothing management and dick care)      Bed Mobility/Transfers: Bed Mobility  Bed Mobility: Yes  Bed Mobility 1  Bed Mobility 1: Supine to sitting (to the R with bed rail and HOB elevated)  Level of Assistance 1: Minimum assistance     Toilet Transfers  Toilet Transfer From: Walker  Toilet Transfer Type: To  Toilet Transfer to: Standard toilet (with rail on the L)  Toilet Transfers: Minimal assistance               Outcome Measures:[facility] Daily Activity  Putting on and taking off regular lower body clothing: A lot  Bathing (including washing, rinsing, drying): A lot  Putting on and taking off regular upper body clothing: A little  Toileting, which includes using toilet, bedpan or urinal: A little  Taking care of personal grooming such as brushing teeth: A little  Eating Meals: None  Daily Activity - Total Score: 17  Education Documentation  Body Mechanics, taught by Erik Domingo OT at 2/10/2024 11:34 AM.  Learner: Patient  Readiness: Acceptance  Method: Explanation, Demonstration  Response: Verbalizes Understanding, Needs Reinforcement    Precautions, taught by Erik Domingo OT at 2/10/2024 11:34 AM.  Learner: Patient  Readiness: Acceptance  Method: Explanation, Demonstration  Response: Verbalizes Understanding, Needs Reinforcement    ADL Training, taught by Erik Domingo OT at 2/10/2024 11:34 AM.  Learner: Patient  Readiness: Acceptance  Method: Explanation, Demonstration  Response: Verbalizes Understanding, Needs Reinforcement    Education Comments  No comments found.        Goals:  Encounter Problems       Encounter Problems (Active)       ADLs       Patient  will perform UB and LB bathing  with contact guard assist level of assistance . (Progressing)       Start:  02/09/24    Expected End:  02/23/24            Patient with complete lower body dressing with contact guard assist level of assistance donning and doffing all LE clothes  with PRN adaptive equipment while edge of bed  and standing (Progressing)       Start:  02/09/24    Expected End:  02/23/24               Mobility       STG - Patient will ambulate (Not Progressing)       Start:  02/09/24    Expected End:  02/23/24       FWW SBA USING PROEPR GAIT PATTERN 50 +FT         STRENGTHENING (Not Progressing)       Start:  02/09/24    Expected End:  03/01/24       20+ REPS RROM INCREASING STRENGTH TO STABILIZE GAIT            TRANSFERS       Patient will complete sit to stand transfer with contact guard assist level of assistance and front wheeled walker in order to improve safety and prepare for out of bed mobility. (Progressing)       Start:  02/09/24    Expected End:  02/23/24               Transfers       STG - Patient to transfer to and from sit to supine (Not Progressing)       Start:  02/09/24    Expected End:  02/19/24       SBA HOB FLAT NO RAIL         STG - Patient will transfer sit to and from stand (Not Progressing)       Start:  02/09/24    Expected End:  02/22/24       FWW USING PROPER TECHNIQUE CGA                        02/10/24 at 11:35 AM - INOCENCIO PANTOJA OT

## 2024-02-10 NOTE — PROGRESS NOTES
"      Nephrology Progress Note      Nephrology following for ESTEE on CKD stage IV    -no N/V no edema      BP (!) 184/80 (BP Location: Left arm, Patient Position: Lying)   Pulse 72   Temp 36.6 °C (97.8 °F) (Temporal)   Resp 16   Ht 1.575 m (5' 2\")   Wt 76.9 kg (169 lb 8.5 oz)   SpO2 92%   BMI 31.01 kg/m²     Input / Output:  24 HR:   Intake/Output Summary (Last 24 hours) at 2/10/2024 0942  Last data filed at 2/9/2024 2100  Gross per 24 hour   Intake 350 ml   Output 50 ml   Net 300 ml         Physical Exam   Alert and oriented x 3 NAD  Neck: no JVD  CV: RRR  Lungs: CTA bilaterally  Abd: soft, NT, ND   Ext: no lower extremity edema    Scheduled medications  allopurinol, 100 mg, oral, Daily  amLODIPine, 5 mg, oral, Daily  aspirin, 81 mg, oral, Daily  fluticasone, 2 spray, Each Nostril, Daily  heparin (porcine), 5,000 Units, subcutaneous, q12h  metoprolol tartrate, 25 mg, oral, BID  pantoprazole, 40 mg, oral, Daily  polyethylene glycol, 17 g, oral, Daily  pravastatin, 40 mg, oral, Nightly  pregabalin, 100 mg, oral, BID      Continuous medications     PRN medications  PRN medications: acetaminophen, acetaminophen, albuterol, ondansetron   Results from last 7 days   Lab Units 02/10/24  0443 02/08/24  0548 02/07/24  2357   SODIUM mmol/L 139   < > 136   POTASSIUM mmol/L 4.2   < > 6.1*   CHLORIDE mmol/L 110*   < > 113*   CO2 mmol/L 22   < > 16*   BUN mg/dL 49*   < > 65*   CREATININE mg/dL 2.54*   < > 3.38*   CALCIUM mg/dL 9.2   < > 9.4   PROTEIN TOTAL g/dL  --   --  6.5   BILIRUBIN TOTAL mg/dL  --   --  0.4   ALK PHOS U/L  --   --  63   ALT U/L  --   --  9   AST U/L  --   --  13   GLUCOSE mg/dL 126*   < > 133*    < > = values in this interval not displayed.        Results from last 7 days   Lab Units 02/10/24  0443 02/09/24  0348   MAGNESIUM mg/dL 1.65 1.22*        Results from last 7 days   Lab Units 02/10/24  0443 02/09/24  0348 02/07/24  2357   WBC AUTO x10*3/uL 6.3 5.4 8.7   HEMOGLOBIN g/dL 7.2* 7.0* 7.7* "   HEMATOCRIT % 23.0* 21.8* 23.9*   PLATELETS AUTO x10*3/uL 314 294 342          Assessment & Plan:     Patient is 78 y.o. female who is admitted to hospital for weakness. Nephrology consulted in view of ESTEE on CKD.    Acute kidney injury on CKD stage IV  -Patient's baseline creatinine has been 2.6  -She does have a history of requiring dialysis in the past from December 2021 to March 2022.  -Current ESTEE is multifactorial-some prerenal from volume depletion on diuretics, acute on chronic anemia  -UA with no evidence of infection at this time  Creatinine on admission 3.5 and has trended to 2.54     Anemia-acute on chronic  -Related to chronic kidney disease and MDS  -She is on high-dose Procrit weekly  Recent transfusion     Hyperkalemia  -mild  -improved with potassium binder  Hypertension  -controlled     Volume status-appears euvolemic   recs:     -No need for renal placement therapy  - diuretics are on hold but can be restarted as needed-not need today  -Patient will have ongoing follow-up in office for her stage IV CKD    Renal panel in a.m.       Please message me through [employer] chat with any questions or concerns.     Júnior Ramirez MD  2/10/2024  9:42 AM     [facility]    [address], [address]   [place] [address]  Office: [phone]

## 2024-02-10 NOTE — PROGRESS NOTES
Tana Hargrove is a 78 y.o. female on day 2 of admission presenting with Acute renal failure superimposed on chronic kidney disease, unspecified acute renal failure type, unspecified CKD stage (CMS/[facility]).      Subjective   Tana Hargrove is a 78 y.o. female with past medical history of myelodysplastic syndrome, atrial fibrillation, heart failure preserved EF, coronary artery disease, COPD, hypertension, dyslipidemia, diabetes mellitus type 2, chronic kidney disease stage IV presenting with complaint of generalized weakness to the emergency room.  Patient states that she is having difficult time ambulating.  Patient states that she normally gets around with a walker but had a blood transfusion yesterday for myelodysplastic disease and now states that her legs are just too weak to ambulate. Lab data shows patient's glucose 133 patient's potassium is 6.1 with a blood of 16 BUN is 65 creatinine of 3.38 baseline creatinine is approximately 2.6-3. Patient does admit to poor oral intake states she just does not have an appetite last few days. CBC shows 8700 white count. H&H is 7.7 and 23.9 with baseline hemoglobin typically hovering in the 7-8 range. Patient's urinalysis has 21-50 WBCs with complaints of dysuria. Patient's potassium level is 6.1. Did receive dextrose and insulin, sodium bicarb, Kayexalate, in the emergency room but potassium delayed diminished to 6.0.      2/8: No acute events overnight. Patient sleeping comfortably this morning. Vitals stable. Endorses continued weakness, dry mouth, and fatigue. Denies any dysuria. Complains of increased urinary frequency.  Patient given another round of medication for hyperkalemia after that potassium did come down to 5.0.  Creatinine is down to 2.88.  Renal input and help is appreciated.        2/9: No acute events overnight. Patient sleeping comfortably this morning. Vitals stable. Endorses continued weakness, dry mouth, and fatigue. New L heel discomfort today. Seen by  OT with recs for moderate intensity level of continued care. Waiting for PT evaluation. H and H at patient's baseline. Hyperkalemia resolved, at 4.9 today. Cr near baseline at 2.72. Nephro Dc'd antibiotics, cultures pending. Dr. Christie planning for outpatient follow up.      Addendum: PT and OT working on discharge planning.  Patient has some ambulatory dysfunction chronically because of what I suspect was previous plantar fascia rupture.  Patient also with callus on the right foot with ulceration there to be seen by podiatry.  As soon as patient is ambulatory enough she can be discharged home with outpatient follow-up.  Patient will be seen by my associate Dr. Gaines tomorrow.    2/10: no acute events overnight. Creatinine 2.54 at base level. Patient refused SNF placement and request home care.        Objective     Last Recorded Vitals  BP (!) 184/80 (BP Location: Left arm, Patient Position: Lying)   Pulse 72   Temp 36.6 °C (97.8 °F) (Temporal)   Resp 16   Wt 76.9 kg (169 lb 8.5 oz)   SpO2 92%   Intake/Output last 3 Shifts:    Intake/Output Summary (Last 24 hours) at 2/10/2024 1246  Last data filed at 2/10/2024 0919  Gross per 24 hour   Intake 590 ml   Output 50 ml   Net 540 ml       Admission Weight  Weight: 79.8 kg (176 lb) (02/07/24 2306)    Daily Weight  02/09/24 : 76.9 kg (169 lb 8.5 oz)    Image Results  US renal complete  Narrative: Interpreted By:  Jayy Hu,   STUDY:  US RENAL COMPLETE;  2/8/2024 11:26 am      INDICATION:  Signs/Symptoms:Acute kidney injury concern about obstruction.      COMPARISON:  None.      ACCESSION NUMBER(S):  [id number]      ORDERING CLINICIAN:  JOSSUE LERMA      TECHNIQUE:  Multiple images of the kidneys were obtained  , with color Doppler  for blood flow.      FINDINGS:  RIGHT KIDNEY:  The right kidney measures 10.3 cm in length.  There is moderate  thinning of the cortex indicating atrophy. The cortex is also  increased echogenicity probably from medical renal  disease.. An  approximate 1.1 cm cortical cyst is present at the mid/upper pole. No  hydronephrosis or evidence of nephrolithiasis. Color Doppler  demonstrates renal blood flow.      LEFT KIDNEY:  The left kidney measures 11.3 cm in length. Again there is cortical  atrophy with increased echogenicity suggesting medical renal  disease..   No hydronephrosis or evidence of nephrolithiasis.. Color  Doppler demonstrates renal blood flow.      BLADDER:  Urinary bladder contour is smooth. There appears to be layering of  some debris posteriorly.      OTHER FINDINGS:  None significant.      Impression: Cortical atrophy and probable medical renal disease.      Signed by: Jayy Hu 2/8/2024 12:44 PM  Dictation workstation:   [id number]  ECG 12 lead  Sinus rhythm  XR chest 2 views  Narrative: Interpreted By:  Finkelstein, Evan,   STUDY:  XR CHEST 2 VIEWS;  2/8/2024 1:12 am      INDICATION:  Signs/Symptoms:SOB. Rales RLL.      COMPARISON:  Chest radiograph 08/14/2023      ACCESSION NUMBER(S):  [id number]      ORDERING CLINICIAN:  DAHIANA DALEY      FINDINGS:  Low lung volumes with bronchovascular crowding.      CARDIOMEDIASTINAL SILHOUETTE:  Cardiomediastinal silhouette is normal in size and configuration.      LUNGS:  No pulmonary consolidation, pleural effusion or pneumothorax.      ABDOMEN:  No remarkable upper abdominal findings.      BONES:  No acute osseous abnormality.      Impression: Low lung volumes with bronchovascular crowding.      MACRO:  None.      Signed by: Evan Finkelstein 2/8/2024 1:20 AM  Dictation workstation:   [id number]      Physical Exam  Constitutional:       General: She is not in acute distress.     Appearance: Normal appearance.   HENT:      Head: Normocephalic.      Mouth/Throat:      Mouth: Mucous membranes are moist.      Pharynx: Oropharynx is clear.   Eyes:      Pupils: Pupils are equal, round, and reactive to light.   Cardiovascular:      Rate and Rhythm: Normal rate and regular  rhythm.      Heart sounds: Normal heart sounds. No murmur heard.     No gallop.   Pulmonary:      Effort: Pulmonary effort is normal.      Breath sounds: Normal breath sounds.   Abdominal:      General: Bowel sounds are normal. There is no distension.      Palpations: Abdomen is soft.      Tenderness: There is no abdominal tenderness.   Musculoskeletal:         General: No swelling. Normal range of motion.      Cervical back: Neck supple. No rigidity.   Skin:     General: Skin is warm and dry.   Neurological:      General: No focal deficit present.      Mental Status: She is alert.      Cranial Nerves: No cranial nerve deficit.      Sensory: No sensory deficit.   Psychiatric:         Mood and Affect: Mood normal.         Behavior: Behavior normal.         Relevant Results             Scheduled medications  allopurinol, 100 mg, oral, Daily  amLODIPine, 5 mg, oral, Daily  aspirin, 81 mg, oral, Daily  fluticasone, 2 spray, Each Nostril, Daily  heparin (porcine), 5,000 Units, subcutaneous, q12h  metoprolol tartrate, 25 mg, oral, BID  pantoprazole, 40 mg, oral, Daily  polyethylene glycol, 17 g, oral, Daily  pravastatin, 40 mg, oral, Nightly  pregabalin, 100 mg, oral, BID      Continuous medications     PRN medications  PRN medications: acetaminophen, acetaminophen, albuterol, ondansetron  Results for orders placed or performed during the hospital encounter of 02/07/24 (from the past 96 hour(s))   ECG 12 lead   Result Value Ref Range    Ventricular Rate 84 BPM    Atrial Rate 84 BPM    NE Interval 168 ms    QRS Duration 83 ms    QT Interval 365 ms    QTC Calculation(Bazett) 432 ms    P Axis 39 degrees    R Axis 12 degrees    T Axis 38 degrees    QRS Count 14 beats    Q Onset 249 ms    T Offset 432 ms    QTC Fredericia 408 ms   CBC and Auto Differential   Result Value Ref Range    WBC 8.7 4.4 - 11.3 x10*3/uL    nRBC 0.2 (H) 0.0 - 0.0 /100 WBCs    RBC 2.51 (L) 4.00 - 5.20 x10*6/uL    Hemoglobin 7.7 (L) 12.0 - 16.0 g/dL     Hematocrit 23.9 (L) 36.0 - 46.0 %    MCV 95 80 - 100 fL    MCH 30.7 26.0 - 34.0 pg    MCHC 32.2 32.0 - 36.0 g/dL    RDW 26.2 (H) 11.5 - 14.5 %    Platelets 342 150 - 450 x10*3/uL    Neutrophils % 69.0 40.0 - 80.0 %    Immature Granulocytes %, Automated 0.6 0.0 - 0.9 %    Lymphocytes % 17.8 13.0 - 44.0 %    Monocytes % 9.0 2.0 - 10.0 %    Eosinophils % 3.3 0.0 - 6.0 %    Basophils % 0.3 0.0 - 2.0 %    Neutrophils Absolute 6.02 (H) 1.60 - 5.50 x10*3/uL    Immature Granulocytes Absolute, Automated 0.05 0.00 - 0.50 x10*3/uL    Lymphocytes Absolute 1.55 0.80 - 3.00 x10*3/uL    Monocytes Absolute 0.79 0.05 - 0.80 x10*3/uL    Eosinophils Absolute 0.29 0.00 - 0.40 x10*3/uL    Basophils Absolute 0.03 0.00 - 0.10 x10*3/uL   Comprehensive metabolic panel   Result Value Ref Range    Glucose 133 (H) 74 - 99 mg/dL    Sodium 136 136 - 145 mmol/L    Potassium 6.1 (HH) 3.5 - 5.3 mmol/L    Chloride 113 (H) 98 - 107 mmol/L    Bicarbonate 16 (L) 21 - 32 mmol/L    Anion Gap 13 10 - 20 mmol/L    Urea Nitrogen 65 (H) 6 - 23 mg/dL    Creatinine 3.38 (H) 0.50 - 1.05 mg/dL    eGFR 13 (L) >60 mL/min/1.73m*2    Calcium 9.4 8.6 - 10.3 mg/dL    Albumin 3.7 3.4 - 5.0 g/dL    Alkaline Phosphatase 63 33 - 136 U/L    Total Protein 6.5 6.4 - 8.2 g/dL    AST 13 9 - 39 U/L    Bilirubin, Total 0.4 0.0 - 1.2 mg/dL    ALT 9 7 - 45 U/L   Troponin I, High Sensitivity   Result Value Ref Range    Troponin I, High Sensitivity 12 0 - 13 ng/L   B-Type Natriuretic Peptide   Result Value Ref Range     (H) 0 - 99 pg/mL   Blood Culture    Specimen: Peripheral Venipuncture; Blood culture   Result Value Ref Range    Blood Culture No growth at 2 days    Morphology   Result Value Ref Range    RBC Morphology See Below     Polychromasia Mild     Hypochromia Mild     Ovalocytes Few     Teardrop Cells Few     Basophilic Stippling Present    Urinalysis with Reflex Culture and Microscopic   Result Value Ref Range    Color, Urine Yellow Straw, Yellow    Appearance,  Urine Clear Clear    Specific Gravity, Urine 1.009 1.005 - 1.035    pH, Urine 5.0 5.0, 5.5, 6.0, 6.5, 7.0, 7.5, 8.0    Protein, Urine 100 (2+) (N) NEGATIVE mg/dL    Glucose, Urine NEGATIVE NEGATIVE mg/dL    Blood, Urine NEGATIVE NEGATIVE    Ketones, Urine NEGATIVE NEGATIVE mg/dL    Bilirubin, Urine NEGATIVE NEGATIVE    Urobilinogen, Urine <2.0 <2.0 mg/dL    Nitrite, Urine NEGATIVE NEGATIVE    Leukocyte Esterase, Urine NEGATIVE NEGATIVE   Extra Urine Gray Tube   Result Value Ref Range    Extra Tube Hold for add-ons.    Urinalysis Microscopic   Result Value Ref Range    WBC, Urine 21-50 (A) 1-5, NONE /HPF    RBC, Urine NONE NONE, 1-2, 3-5 /HPF    Bacteria, Urine 2+ (A) NONE SEEN /HPF   Urine Culture    Specimen: Clean Catch/Voided; Urine   Result Value Ref Range    Urine Culture >100,000 Klebsiella pneumoniae/variicola (A)        Susceptibility    Klebsiella pneumoniae/variicola - MICROSCAN     Ampicillin  Resistant      Amoxicillin/Clavulanate  Susceptible      Ampicillin/Sulbactam  Susceptible      Cefazolin  Susceptible      Cefazolin (uncomplicated UTIs only)  Susceptible      Ciprofloxacin  Susceptible      Gentamicin  Susceptible      Nitrofurantoin  Susceptible      Piperacillin/Tazobactam  Susceptible      Trimethoprim/Sulfamethoxazole  Susceptible    Blood Culture    Specimen: Peripheral Venipuncture; Blood culture   Result Value Ref Range    Blood Culture No growth at 2 days    Influenza A, and B PCR   Result Value Ref Range    Flu A Result Not Detected Not Detected    Flu B Result Not Detected Not Detected   Sars-CoV-2 PCR   Result Value Ref Range    Coronavirus 2019, PCR Not Detected Not Detected   Blood Gas Venous Full Panel   Result Value Ref Range    POCT pH, Venous 7.34 7.33 - 7.43 pH    POCT pCO2, Venous 35 (L) 41 - 51 mm Hg    POCT pO2, Venous 55 (H) 35 - 45 mm Hg    POCT SO2, Venous 87 (H) 45 - 75 %    POCT Oxy Hemoglobin, Venous 85.1 (H) 45.0 - 75.0 %    POCT Hematocrit Calculated, Venous 23.0 (L)  36.0 - 46.0 %    POCT Sodium, Venous 137 136 - 145 mmol/L    POCT Potassium, Venous 6.0 (H) 3.5 - 5.3 mmol/L    POCT Chloride, Venous 114 (H) 98 - 107 mmol/L    POCT Ionized Calicum, Venous 1.41 (H) 1.10 - 1.33 mmol/L    POCT Glucose, Venous 95 74 - 99 mg/dL    POCT Lactate, Venous 1.0 0.4 - 2.0 mmol/L    POCT Base Excess, Venous -6.3 (L) -2.0 - 3.0 mmol/L    POCT HCO3 Calculated, Venous 18.9 (L) 22.0 - 26.0 mmol/L    POCT Hemoglobin, Venous 7.5 (L) 12.0 - 16.0 g/dL    POCT Anion Gap, Venous 10.0 10.0 - 25.0 mmol/L    Patient Temperature 37.0 degrees Celsius    FiO2 21 %   Potassium   Result Value Ref Range    Potassium 6.0 (H) 3.5 - 5.3 mmol/L   Basic metabolic panel   Result Value Ref Range    Glucose 121 (H) 74 - 99 mg/dL    Sodium 139 136 - 145 mmol/L    Potassium 6.0 (H) 3.5 - 5.3 mmol/L    Chloride 113 (H) 98 - 107 mmol/L    Bicarbonate 19 (L) 21 - 32 mmol/L    Anion Gap 13 10 - 20 mmol/L    Urea Nitrogen 68 (H) 6 - 23 mg/dL    Creatinine 3.37 (H) 0.50 - 1.05 mg/dL    eGFR 13 (L) >60 mL/min/1.73m*2    Calcium 9.5 8.6 - 10.3 mg/dL   Basic metabolic panel   Result Value Ref Range    Glucose 114 (H) 74 - 99 mg/dL    Sodium 137 136 - 145 mmol/L    Potassium 6.0 (H) 3.5 - 5.3 mmol/L    Chloride 114 (H) 98 - 107 mmol/L    Bicarbonate 21 21 - 32 mmol/L    Anion Gap 8 (L) 10 - 20 mmol/L    Urea Nitrogen 64 (H) 6 - 23 mg/dL    Creatinine 3.19 (H) 0.50 - 1.05 mg/dL    eGFR 14 (L) >60 mL/min/1.73m*2    Calcium 9.1 8.6 - 10.3 mg/dL   Basic metabolic panel   Result Value Ref Range    Glucose 97 74 - 99 mg/dL    Sodium 139 136 - 145 mmol/L    Potassium 5.0 3.5 - 5.3 mmol/L    Chloride 110 (H) 98 - 107 mmol/L    Bicarbonate 24 21 - 32 mmol/L    Anion Gap 10 10 - 20 mmol/L    Urea Nitrogen 59 (H) 6 - 23 mg/dL    Creatinine 2.88 (H) 0.50 - 1.05 mg/dL    eGFR 16 (L) >60 mL/min/1.73m*2    Calcium 9.4 8.6 - 10.3 mg/dL   CBC and Auto Differential   Result Value Ref Range    WBC 5.4 4.4 - 11.3 x10*3/uL    nRBC 0.4 (H) 0.0 - 0.0  /100 WBCs    RBC 2.27 (L) 4.00 - 5.20 x10*6/uL    Hemoglobin 7.0 (L) 12.0 - 16.0 g/dL    Hematocrit 21.8 (L) 36.0 - 46.0 %    MCV 96 80 - 100 fL    MCH 30.8 26.0 - 34.0 pg    MCHC 32.1 32.0 - 36.0 g/dL    RDW 26.0 (H) 11.5 - 14.5 %    Platelets 294 150 - 450 x10*3/uL    Neutrophils % 50.7 40.0 - 80.0 %    Immature Granulocytes %, Automated 0.2 0.0 - 0.9 %    Lymphocytes % 32.5 13.0 - 44.0 %    Monocytes % 10.6 2.0 - 10.0 %    Eosinophils % 5.4 0.0 - 6.0 %    Basophils % 0.6 0.0 - 2.0 %    Neutrophils Absolute 2.74 1.60 - 5.50 x10*3/uL    Immature Granulocytes Absolute, Automated 0.01 0.00 - 0.50 x10*3/uL    Lymphocytes Absolute 1.75 0.80 - 3.00 x10*3/uL    Monocytes Absolute 0.57 0.05 - 0.80 x10*3/uL    Eosinophils Absolute 0.29 0.00 - 0.40 x10*3/uL    Basophils Absolute 0.03 0.00 - 0.10 x10*3/uL   Basic Metabolic Panel   Result Value Ref Range    Glucose 106 (H) 74 - 99 mg/dL    Sodium 140 136 - 145 mmol/L    Potassium 4.9 3.5 - 5.3 mmol/L    Chloride 111 (H) 98 - 107 mmol/L    Bicarbonate 23 21 - 32 mmol/L    Anion Gap 11 10 - 20 mmol/L    Urea Nitrogen 50 (H) 6 - 23 mg/dL    Creatinine 2.72 (H) 0.50 - 1.05 mg/dL    eGFR 17 (L) >60 mL/min/1.73m*2    Calcium 8.7 8.6 - 10.3 mg/dL   Magnesium   Result Value Ref Range    Magnesium 1.22 (L) 1.60 - 2.40 mg/dL   Morphology   Result Value Ref Range    RBC Morphology See Below     Polychromasia Mild     Hypochromia Mild     Ovalocytes Few     Teardrop Cells Few     Basophilic Stippling Present    CBC and Auto Differential   Result Value Ref Range    WBC 6.3 4.4 - 11.3 x10*3/uL    nRBC 0.5 (H) 0.0 - 0.0 /100 WBCs    RBC 2.38 (L) 4.00 - 5.20 x10*6/uL    Hemoglobin 7.2 (L) 12.0 - 16.0 g/dL    Hematocrit 23.0 (L) 36.0 - 46.0 %    MCV 97 80 - 100 fL    MCH 30.3 26.0 - 34.0 pg    MCHC 31.3 (L) 32.0 - 36.0 g/dL    RDW 25.2 (H) 11.5 - 14.5 %    Platelets 314 150 - 450 x10*3/uL    Neutrophils % 64.3 40.0 - 80.0 %    Immature Granulocytes %, Automated 1.1 (H) 0.0 - 0.9 %     Lymphocytes % 21.0 13.0 - 44.0 %    Monocytes % 9.2 2.0 - 10.0 %    Eosinophils % 3.8 0.0 - 6.0 %    Basophils % 0.6 0.0 - 2.0 %    Neutrophils Absolute 4.04 1.60 - 5.50 x10*3/uL    Immature Granulocytes Absolute, Automated 0.07 0.00 - 0.50 x10*3/uL    Lymphocytes Absolute 1.32 0.80 - 3.00 x10*3/uL    Monocytes Absolute 0.58 0.05 - 0.80 x10*3/uL    Eosinophils Absolute 0.24 0.00 - 0.40 x10*3/uL    Basophils Absolute 0.04 0.00 - 0.10 x10*3/uL   Basic Metabolic Panel   Result Value Ref Range    Glucose 126 (H) 74 - 99 mg/dL    Sodium 139 136 - 145 mmol/L    Potassium 4.2 3.5 - 5.3 mmol/L    Chloride 110 (H) 98 - 107 mmol/L    Bicarbonate 22 21 - 32 mmol/L    Anion Gap 11 10 - 20 mmol/L    Urea Nitrogen 49 (H) 6 - 23 mg/dL    Creatinine 2.54 (H) 0.50 - 1.05 mg/dL    eGFR 19 (L) >60 mL/min/1.73m*2    Calcium 9.2 8.6 - 10.3 mg/dL   Magnesium   Result Value Ref Range    Magnesium 1.65 1.60 - 2.40 mg/dL   Morphology   Result Value Ref Range    RBC Morphology See Below     Polychromasia Mild     Hypochromia Mild     Ovalocytes Few     Teardrop Cells Few     Basophilic Stippling Present      *Note: Due to a large number of results and/or encounters for the requested time period, some results have not been displayed. A complete set of results can be found in Results Review.       Assessment/Plan                  Principal Problem:    Acute renal failure superimposed on chronic kidney disease, unspecified acute renal failure type, unspecified CKD stage (CMS/HCC)    1. Acute renal failure superimposed on chronic kidney disease, unspecified acute renal failure type, unspecified CKD stage (CMS/HCC)      Creatinine 2.54, at base level, nephrology note appreciated      2. Anemia, unspecified type      due to myodysplstic disease and chronic disease. continue monitor      3. Hyperkalemia      improved      4. Hypertension, essential      continue home meds      5. Paroxysmal atrial fibrillation (CMS/[facility])      HR controlled                       Tigist Gaines MD

## 2024-02-10 NOTE — PROGRESS NOTES
Physical Therapy  Physical Therapy Treatment    Patient Name: Tana Hargrove  MRN: [id number]  Today's Date: 2/10/2024  Time Calculation  Start Time: 1151  Stop Time: 1208  Time Calculation (min): 17 min     Assessment/Plan   PT Plan  Treatment/Interventions: Bed mobility, Transfer training, Gait training, Strengthening, Endurance training ([employer] MOBILITY SAFETY)  PT Plan: Skilled PT  PT Frequency: 4 times per week  PT Discharge Recommendations: Moderate intensity level of continued care  Equipment Recommended upon Discharge:  (TBD)  PT Recommended Transfer Status: Assist x1 (FWW)  PT - OK to Discharge: Yes (WHEN MEDICALLY CLEARED)    General Visit Information:   PT  Visit  PT Received On: 02/10/24  Response to Previous Treatment: Patient with no complaints from previous session.  Reason for Referral: ESTEE/CKD/UTI  Prior to Session Communication: Bedside nurse  Patient Position Received: Up in chair, Alarm on  Room: 2305    Subjective   Precautions:  Falls     Objective   Pain:  Pain Score: 0 - No pain    Cognition:  Within Functional Limits    Activity Tolerance:  Activity Tolerance  Endurance: Tolerates 10 - 20 min exercise with multiple rests    Treatments:  Ambulation/Gait Training  100 feet x1 rep with FWW, Marin  X1 mild LOB with Marin to correct, otherwise CGA  X1 brief standing rest break    Transfers  Sit to stand: CGA  Stand to sit: CGA  Assistive device: Rolling walker    Pt sitting in chair pre/post tx. Alarm on and call light in reach.     Outcome Measures:  [facility] Basic Mobility  Turning from your back to your side while in a flat bed without using bedrails: A little  Moving from lying on your back to sitting on the side of a flat bed without using bedrails: A little  Moving to and from bed to chair (including a wheelchair): A little  Standing up from a chair using your arms (e.g. wheelchair or bedside chair): A little  To walk in hospital room: A little  Climbing 3-5 steps with railing: A lot  Basic  Mobility - Total Score: 17    Education Documentation  Mobility Training, taught by Edenilson Escalante PTA at 2/10/2024  1:24 PM.  Learner: Patient  Readiness: Acceptance  Method: Explanation, Demonstration  Response: Verbalizes Understanding, Demonstrated Understanding    Education Comments  No comments found.        OP EDUCATION:       Encounter Problems       Encounter Problems (Active)       Mobility       STG - Patient will ambulate (Progressing)       Start:  02/09/24    Expected End:  02/23/24       FWW SBA USING PROEPR GAIT PATTERN 50 +FT         STRENGTHENING (Progressing)       Start:  02/09/24    Expected End:  03/01/24       20+ REPS RROM INCREASING STRENGTH TO STABILIZE GAIT            Pain - Adult          Transfers       STG - Patient to transfer to and from sit to supine (Progressing)       Start:  02/09/24    Expected End:  02/19/24       SBA HOB FLAT NO RAIL         STG - Patient will transfer sit to and from stand (Progressing)       Start:  02/09/24    Expected End:  02/22/24       FWW USING PROPER TECHNIQUE CGA

## 2024-02-11 ENCOUNTER — DOCUMENTATION (OUTPATIENT)
Dept: HOME HEALTH SERVICES | Facility: HOME HEALTH | Age: 79
End: 2024-02-11
Payer: MEDICARE

## 2024-02-11 ENCOUNTER — HOME HEALTH ADMISSION (OUTPATIENT)
Dept: HOME HEALTH SERVICES | Facility: HOME HEALTH | Age: 79
End: 2024-02-11
Payer: MEDICARE

## 2024-02-11 VITALS
DIASTOLIC BLOOD PRESSURE: 75 MMHG | OXYGEN SATURATION: 92 % | HEIGHT: 62 IN | RESPIRATION RATE: 17 BRPM | TEMPERATURE: 97.8 F | WEIGHT: 169.53 LBS | HEART RATE: 69 BPM | BODY MASS INDEX: 31.2 KG/M2 | SYSTOLIC BLOOD PRESSURE: 173 MMHG

## 2024-02-11 PROBLEM — N18.9 ACUTE RENAL FAILURE SUPERIMPOSED ON CHRONIC KIDNEY DISEASE, UNSPECIFIED ACUTE RENAL FAILURE TYPE, UNSPECIFIED CKD STAGE (CMS-HCC): Status: RESOLVED | Noted: 2024-02-08 | Resolved: 2024-02-11

## 2024-02-11 PROBLEM — N17.9 ACUTE RENAL FAILURE SUPERIMPOSED ON CHRONIC KIDNEY DISEASE, UNSPECIFIED ACUTE RENAL FAILURE TYPE, UNSPECIFIED CKD STAGE (CMS-HCC): Status: RESOLVED | Noted: 2024-02-08 | Resolved: 2024-02-11

## 2024-02-11 LAB
ANION GAP SERPL CALC-SCNC: 12 MMOL/L (ref 10–20)
BUN SERPL-MCNC: 46 MG/DL (ref 6–23)
CALCIUM SERPL-MCNC: 9.4 MG/DL (ref 8.6–10.3)
CHLORIDE SERPL-SCNC: 108 MMOL/L (ref 98–107)
CO2 SERPL-SCNC: 23 MMOL/L (ref 21–32)
CREAT SERPL-MCNC: 2.65 MG/DL (ref 0.5–1.05)
EGFRCR SERPLBLD CKD-EPI 2021: 18 ML/MIN/1.73M*2
ERYTHROCYTE [DISTWIDTH] IN BLOOD BY AUTOMATED COUNT: 24.3 % (ref 11.5–14.5)
GLUCOSE SERPL-MCNC: 122 MG/DL (ref 74–99)
HCT VFR BLD AUTO: 23.4 % (ref 36–46)
HGB BLD-MCNC: 7.4 G/DL (ref 12–16)
MCH RBC QN AUTO: 30.2 PG (ref 26–34)
MCHC RBC AUTO-ENTMCNC: 31.6 G/DL (ref 32–36)
MCV RBC AUTO: 96 FL (ref 80–100)
NRBC BLD-RTO: 0.5 /100 WBCS (ref 0–0)
PLATELET # BLD AUTO: 315 X10*3/UL (ref 150–450)
POTASSIUM SERPL-SCNC: 4.1 MMOL/L (ref 3.5–5.3)
RBC # BLD AUTO: 2.45 X10*6/UL (ref 4–5.2)
SODIUM SERPL-SCNC: 139 MMOL/L (ref 136–145)
WBC # BLD AUTO: 6.5 X10*3/UL (ref 4.4–11.3)

## 2024-02-11 PROCEDURE — 2500000002 HC RX 250 W HCPCS SELF ADMINISTERED DRUGS (ALT 637 FOR MEDICARE OP, ALT 636 FOR OP/ED): Performed by: INTERNAL MEDICINE

## 2024-02-11 PROCEDURE — 80048 BASIC METABOLIC PNL TOTAL CA: CPT | Performed by: INTERNAL MEDICINE

## 2024-02-11 PROCEDURE — 85027 COMPLETE CBC AUTOMATED: CPT | Performed by: INTERNAL MEDICINE

## 2024-02-11 PROCEDURE — 97116 GAIT TRAINING THERAPY: CPT | Mod: CQ,GP

## 2024-02-11 PROCEDURE — 2500000001 HC RX 250 WO HCPCS SELF ADMINISTERED DRUGS (ALT 637 FOR MEDICARE OP): Performed by: INTERNAL MEDICINE

## 2024-02-11 PROCEDURE — 2500000004 HC RX 250 GENERAL PHARMACY W/ HCPCS (ALT 636 FOR OP/ED): Performed by: INTERNAL MEDICINE

## 2024-02-11 PROCEDURE — 36415 COLL VENOUS BLD VENIPUNCTURE: CPT | Performed by: INTERNAL MEDICINE

## 2024-02-11 RX ADMIN — METOPROLOL TARTRATE 25 MG: 25 TABLET, FILM COATED ORAL at 07:50

## 2024-02-11 RX ADMIN — PANTOPRAZOLE SODIUM 40 MG: 40 TABLET, DELAYED RELEASE ORAL at 07:50

## 2024-02-11 RX ADMIN — ALLOPURINOL 100 MG: 100 TABLET ORAL at 07:50

## 2024-02-11 RX ADMIN — ASPIRIN 81 MG: 81 TABLET, COATED ORAL at 07:50

## 2024-02-11 RX ADMIN — AMLODIPINE BESYLATE 5 MG: 5 TABLET ORAL at 07:50

## 2024-02-11 RX ADMIN — HEPARIN SODIUM 5000 UNITS: 5000 INJECTION INTRAVENOUS; SUBCUTANEOUS at 07:50

## 2024-02-11 RX ADMIN — PREGABALIN 100 MG: 50 CAPSULE ORAL at 07:50

## 2024-02-11 ASSESSMENT — COGNITIVE AND FUNCTIONAL STATUS - GENERAL
DRESSING REGULAR UPPER BODY CLOTHING: A LITTLE
HELP NEEDED FOR BATHING: A LITTLE
WALKING IN HOSPITAL ROOM: A LITTLE
CLIMB 3 TO 5 STEPS WITH RAILING: A LITTLE
WALKING IN HOSPITAL ROOM: A LITTLE
STANDING UP FROM CHAIR USING ARMS: A LITTLE
MOBILITY SCORE: 19
MOVING TO AND FROM BED TO CHAIR: A LITTLE
STANDING UP FROM CHAIR USING ARMS: A LITTLE
DRESSING REGULAR LOWER BODY CLOTHING: A LITTLE
TURNING FROM BACK TO SIDE WHILE IN FLAT BAD: A LITTLE
DAILY ACTIVITIY SCORE: 18
TOILETING: A LOT
TURNING FROM BACK TO SIDE WHILE IN FLAT BAD: A LITTLE
MOBILITY SCORE: 18
CLIMB 3 TO 5 STEPS WITH RAILING: A LITTLE
MOVING FROM LYING ON BACK TO SITTING ON SIDE OF FLAT BED WITH BEDRAILS: A LITTLE
MOVING TO AND FROM BED TO CHAIR: A LITTLE
PERSONAL GROOMING: A LITTLE

## 2024-02-11 ASSESSMENT — PAIN SCALES - GENERAL
PAINLEVEL_OUTOF10: 0 - NO PAIN
PAINLEVEL_OUTOF10: 0 - NO PAIN

## 2024-02-11 ASSESSMENT — PAIN - FUNCTIONAL ASSESSMENT
PAIN_FUNCTIONAL_ASSESSMENT: 0-10
PAIN_FUNCTIONAL_ASSESSMENT: 0-10

## 2024-02-11 NOTE — HH CARE COORDINATION
Home Care received a Referral for Nursing, Physical Therapy, and Occupational Therapy. We have processed the referral for a Start of Care on 2/12-2/13.     If you have any questions or concerns, please feel free to contact us at [phone]. Follow the prompts, enter your five digit zip code, and you will be directed to your care team on [facility].

## 2024-02-11 NOTE — PROGRESS NOTES
Physical Therapy  Physical Therapy Treatment    Patient Name: Tana Hargrove  MRN: [id number]  Today's Date: 2/11/2024  Time Calculation  Start Time: 0954  Stop Time: 1010  Time Calculation (min): 16 min     Assessment/Plan   PT Plan  Treatment/Interventions: Bed mobility, Transfer training, Gait training, Strengthening, Endurance training (REINFORCE MOBILITY SAFETY)  PT Plan: Skilled PT  PT Frequency: 4 times per week  PT Discharge Recommendations: Moderate intensity level of continued care  Equipment Recommended upon Discharge:  (TBD)  PT Recommended Transfer Status: Assist x1 (FWW)  PT - OK to Discharge: Yes (WHEN MEDICALLY CLEARED)    General Visit Information:   PT  Visit  PT Received On: 02/11/24  Response to Previous Treatment: Patient with no complaints from previous session.  Reason for Referral: ESTEE/CKD/UTI  Prior to Session Communication: Bedside nurse  Patient Position Received: Up in chair, Alarm on  Room: 2305    Subjective   Precautions:  Falls     Objective   Pain:  Pain Score: 0 - No pain    Cognition:  Within Functional Limits    Activity Tolerance:  Activity Tolerance  Endurance: Tolerates 30 min exercise with multiple rests    Treatments:  Bed Mobility  Supine to sitting: Supervision  Scooting: Supervision    Ambulation/Gait Training  200 feet x1 rep with FWW, Supervision    Transfers  Sit to stand: Supervision  Stand to sit: Supervision  Transfer Device: Rolling Walker      Other Activity  Other Activity Performed:  (chair following tx)    Outcome Measures:  [facility] Basic Mobility  Turning from your back to your side while in a flat bed without using bedrails: A little  Moving from lying on your back to sitting on the side of a flat bed without using bedrails: A little  Moving to and from bed to chair (including a wheelchair): A little  Standing up from a chair using your arms (e.g. wheelchair or bedside chair): A little  To walk in hospital room: A little  Climbing 3-5 steps with railing: A  linda  Basic Mobility - Total Score: 18    Education Documentation  Mobility Training, taught by Edenilson Escalante PTA at 2/11/2024 10:52 AM.  Learner: Patient  Readiness: Acceptance  Method: Explanation, Demonstration  Response: Verbalizes Understanding, Demonstrated Understanding    Education Comments  No comments found.        OP EDUCATION:       Encounter Problems       Encounter Problems (Active)       Mobility       STG - Patient will ambulate (Progressing)       Start:  02/09/24    Expected End:  02/23/24       FWW SBA USING PROEPR GAIT PATTERN 50 +FT         STRENGTHENING (Progressing)       Start:  02/09/24    Expected End:  03/01/24       20+ REPS RROM INCREASING STRENGTH TO STABILIZE GAIT            Pain - Adult          Transfers       STG - Patient to transfer to and from sit to supine (Progressing)       Start:  02/09/24    Expected End:  02/19/24       SBA HOB FLAT NO RAIL         STG - Patient will transfer sit to and from stand (Progressing)       Start:  02/09/24    Expected End:  02/22/24       FWW USING PROPER TECHNIQUE CGA

## 2024-02-11 NOTE — CARE PLAN
The patient's goals for the shift include rest    The clinical goals for the shift include Patient to not have falls this shift

## 2024-02-11 NOTE — DISCHARGE SUMMARY
Discharge Diagnosis  Acute renal failure superimposed on chronic kidney disease, unspecified acute renal failure type, unspecified CKD stage (CMS/[facility])    Issues Requiring Follow-Up  Acute renal failure superimposed on chronic kidney disease    Discharge Meds     Your medication list        CHANGE how you take these medications        Instructions Last Dose Given Next Dose Due   allopurinol 100 mg tablet  Commonly known as: Zyloprim  What changed: additional instructions      Take 1 tablet (100 mg) by mouth once daily.              CONTINUE taking these medications        Instructions Last Dose Given Next Dose Due   acetaminophen 500 mg tablet  Commonly known as: Tylenol           amLODIPine 5 mg tablet  Commonly known as: Norvasc           aspirin 81 mg EC tablet           cholecalciferol 50 MCG (2000 UT) tablet  Commonly known as: Vitamin D-3           cyanocobalamin 1,000 mcg tablet  Commonly known as: Vitamin B-12           fluticasone 50 mcg/actuation nasal spray  Commonly known as: Flonase           furosemide 20 mg tablet  Commonly known as: Lasix      TAKE 1 TABLET BY MOUTH ONCE DAILY       meclizine 12.5 mg tablet  Commonly known as: Antivert      Take 1 tablet (12.5 mg) by mouth 2 times a day as needed for dizziness.       metoprolol tartrate 25 mg tablet  Commonly known as: Lopressor           pioglitazone 15 mg tablet  Commonly known as: Actos      Take 1 tablet (15 mg) by mouth once daily.       pravastatin 40 mg tablet  Commonly known as: Pravachol      Take 1 tablet (40 mg) by mouth once daily at bedtime.       pregabalin 100 mg capsule  Commonly known as: Lyrica      TAKE ONE CAPSULE BY MOUTH TWICE DAILY @9AM & 5PM       Procrit 10,000 unit/mL injection  Generic drug: epoetin alejandra           SITagliptin phosphate 100 mg tablet  Commonly known as: Januvia      Take 1 tablet (100 mg) by mouth once daily.                Test Results Pending At Discharge  Pending Labs       Order Current Status    Blood  Culture Preliminary result    Blood Culture Preliminary result            Hospital Course   Tana Hargrove is a 78 y.o. female with past medical history of myelodysplastic syndrome, atrial fibrillation, heart failure preserved EF, coronary artery disease, COPD, hypertension, dyslipidemia, diabetes mellitus type 2, chronic kidney disease stage IV presenting with complaint of generalized weakness to the emergency room.  Patient states that she is having difficult time ambulating.  Patient states that she normally gets around with a walker but had a blood transfusion yesterday for myelodysplastic disease and now states that her legs are just too weak to ambulate. Lab data shows patient's glucose 133 patient's potassium is 6.1 with a blood of 16 BUN is 65 creatinine of 3.38 baseline creatinine is approximately 2.6-3. Patient does admit to poor oral intake states she just does not have an appetite last few days. CBC shows 8700 white count. H&H is 7.7 and 23.9 with baseline hemoglobin typically hovering in the 7-8 range. Patient's urinalysis has 21-50 WBCs with complaints of dysuria. Patient's potassium level is 6.1. Did receive dextrose and insulin, sodium bicarb, Kayexalate, in the emergency room but potassium delayed diminished to 6.0.      2/8: No acute events overnight. Patient sleeping comfortably this morning. Vitals stable. Endorses continued weakness, dry mouth, and fatigue. Denies any dysuria. Complains of increased urinary frequency.  Patient given another round of medication for hyperkalemia after that potassium did come down to 5.0.  Creatinine is down to 2.88.  Renal input and help is appreciated.        2/9: No acute events overnight. Patient sleeping comfortably this morning. Vitals stable. Endorses continued weakness, dry mouth, and fatigue. New L heel discomfort today. Seen by OT with recs for moderate intensity level of continued care. Waiting for PT evaluation. H and H at patient's baseline.  Hyperkalemia resolved, at 4.9 today. Cr near baseline at 2.72. Nephro Dc'd antibiotics, cultures pending. Dr. Christie planning for outpatient follow up.      Addendum: PT and OT working on discharge planning.  Patient has some ambulatory dysfunction chronically because of what I suspect was previous plantar fascia rupture.  Patient also with callus on the right foot with ulceration there to be seen by podiatry.  As soon as patient is ambulatory enough she can be discharged home with outpatient follow-up.  Patient will be seen by my associate Dr. Gaines tomorrow.     2/10: no acute events overnight. Creatinine 2.54 at base level. Patient refused SNF placement and request home care.     2/11: no acute events overnight. Creatitine 2.65 and Hgb 7.4 base level. Patient like to go home with home care    Pertinent Physical Exam At Time of Discharge  Physical Exam  Constitutional:       General: She is not in acute distress.     Appearance: Normal appearance.   HENT:      Head: Normocephalic.      Mouth/Throat:      Mouth: Mucous membranes are moist.      Pharynx: Oropharynx is clear.   Eyes:      Pupils: Pupils are equal, round, and reactive to light.   Cardiovascular:      Rate and Rhythm: Normal rate and regular rhythm.      Heart sounds: Normal heart sounds. No murmur heard.     No gallop.   Pulmonary:      Effort: Pulmonary effort is normal.      Breath sounds: Normal breath sounds.   Abdominal:      General: Bowel sounds are normal. There is no distension.      Palpations: Abdomen is soft.      Tenderness: There is no abdominal tenderness.   Musculoskeletal:         General: No swelling. Normal range of motion.      Cervical back: Neck supple. No rigidity.   Skin:     General: Skin is warm and dry.   Neurological:      General: No focal deficit present.      Mental Status: She is alert.      Cranial Nerves: No cranial nerve deficit.      Sensory: No sensory deficit.   Psychiatric:         Mood and Affect: Mood normal.          Behavior: Behavior normal.         Outpatient Follow-Up  Future Appointments   Date Time Provider Department [place]   2/13/2024 12:30 PM INF 01 [place] [id number] [facility]   2/19/2024 11:00 AM INF 00 [place] [id number] [facility]   2/27/2024 12:00 PM INF 01 [place] [id number] [facility]   3/5/2024 11:30 AM INF 00 [place] [id number] [facility]   3/6/2024  9:40 AM Carlos Higgins MD [id number] [facility]   3/11/2024  2:00 PM INF 00 [place] [id number] [facility]   3/11/2024  2:30 PM Rosanne M Casal, APRN-CNP, DNP [id number] [facility]   3/18/2024  2:00 PM INF 00 [place] [id number] [facility]   3/25/2024  1:30 PM INF 00 [place] [id number] [facility]   4/16/2024 11:00 AM Carlos Higgins MD [id number] [facility]     The discharge took more than 30 min to complete    Tigist Gaines MD

## 2024-02-11 NOTE — NURSING NOTE
Patient discharged to home. IV removed and tele. [id number] educated on medication change. Patient gave return understanding.

## 2024-02-12 ENCOUNTER — APPOINTMENT (OUTPATIENT)
Dept: HEMATOLOGY/ONCOLOGY | Facility: CLINIC | Age: 79
End: 2024-02-12
Payer: MEDICARE

## 2024-02-12 ENCOUNTER — PATIENT OUTREACH (OUTPATIENT)
Dept: CARE COORDINATION | Facility: CLINIC | Age: 79
End: 2024-02-12
Payer: MEDICARE

## 2024-02-12 ENCOUNTER — DOCUMENTATION (OUTPATIENT)
Dept: PRIMARY CARE | Facility: CLINIC | Age: 79
End: 2024-02-12
Payer: MEDICARE

## 2024-02-12 LAB
BACTERIA BLD CULT: NORMAL
BACTERIA BLD CULT: NORMAL

## 2024-02-12 NOTE — PROGRESS NOTES
Discharge Facility: [facility]  Discharge Diagnosis:Acute renal failure  Admission Date:02/08/24  Discharge Date: 02/11/24    PCP Appointment Date:none available sent to pcp office  Specialist Appointment Date:   Hospital Encounter and Summary: Linked   See discharge assessment below for further details  Engagement  Call Start Time: 0852 (2/12/2024  8:52 AM)    Medications  Medications reviewed with patient/caregiver?: Yes (no new meds) (2/12/2024  8:52 AM)  Is the patient having any side effects they believe may be caused by any medication additions or changes?: No (2/12/2024  8:52 AM)  Does the patient have all medications ordered at discharge?: Not applicable (2/12/2024  8:52 AM)  Is the patient taking all medications as directed (includes completed medication regime)?: Yes (2/12/2024  8:52 AM)    Appointments  Does the patient have a primary care provider?: Yes (2/12/2024  8:52 AM)  Care Management Interventions: Advised patient to make appointment (2/12/2024  8:52 AM)    Self Management  Has home health visited the patient within 72 hours of discharge?: Yes (2/12/2024  8:52 AM)  Has all Durable Medical Equipment (DME) been delivered?: No (2/12/2024  8:52 AM)    Patient Teaching  Does the patient have access to their discharge instructions?: Yes (2/12/2024  8:52 AM)  Care Management Interventions: Reviewed instructions with patient (2/12/2024  8:52 AM)  What is the patient's perception of their health status since discharge?: Improving (2/12/2024  8:52 AM)    Wrap Up  Call End Time: 0900 (2/12/2024  8:52 AM)

## 2024-02-13 ENCOUNTER — APPOINTMENT (OUTPATIENT)
Dept: HEMATOLOGY/ONCOLOGY | Facility: CLINIC | Age: 79
End: 2024-02-13
Payer: MEDICARE

## 2024-02-13 ENCOUNTER — INFUSION (OUTPATIENT)
Dept: HEMATOLOGY/ONCOLOGY | Facility: CLINIC | Age: 79
End: 2024-02-13
Payer: MEDICARE

## 2024-02-13 VITALS
HEIGHT: 61 IN | SYSTOLIC BLOOD PRESSURE: 130 MMHG | OXYGEN SATURATION: 98 % | DIASTOLIC BLOOD PRESSURE: 70 MMHG | TEMPERATURE: 97.9 F | BODY MASS INDEX: 32.43 KG/M2 | HEART RATE: 105 BPM | RESPIRATION RATE: 16 BRPM

## 2024-02-13 DIAGNOSIS — D46.9 MYELODYSPLASTIC SYNDROME (MULTI): ICD-10-CM

## 2024-02-13 LAB
BASOPHILS # BLD AUTO: 0.09 X10*3/UL (ref 0–0.1)
BASOPHILS NFR BLD AUTO: 1 %
DACRYOCYTES BLD QL SMEAR: NORMAL
EOSINOPHIL # BLD AUTO: 0.38 X10*3/UL (ref 0–0.4)
EOSINOPHIL NFR BLD AUTO: 4.4 %
ERYTHROCYTE [DISTWIDTH] IN BLOOD BY AUTOMATED COUNT: 24.8 % (ref 11.5–14.5)
HCT VFR BLD AUTO: 27.5 % (ref 36–46)
HGB BLD-MCNC: 8.4 G/DL (ref 12–16)
HYPOCHROMIA BLD QL SMEAR: NORMAL
IMM GRANULOCYTES # BLD AUTO: 0.05 X10*3/UL (ref 0–0.5)
IMM GRANULOCYTES NFR BLD AUTO: 0.6 % (ref 0–0.9)
LYMPHOCYTES # BLD AUTO: 1.95 X10*3/UL (ref 0.8–3)
LYMPHOCYTES NFR BLD AUTO: 22.5 %
MCH RBC QN AUTO: 29.7 PG (ref 26–34)
MCHC RBC AUTO-ENTMCNC: 30.5 G/DL (ref 32–36)
MCV RBC AUTO: 97 FL (ref 80–100)
MONOCYTES # BLD AUTO: 0.75 X10*3/UL (ref 0.05–0.8)
MONOCYTES NFR BLD AUTO: 8.7 %
NEUTROPHILS # BLD AUTO: 5.45 X10*3/UL (ref 1.6–5.5)
NEUTROPHILS NFR BLD AUTO: 62.8 %
NRBC BLD-RTO: ABNORMAL /100{WBCS}
OVALOCYTES BLD QL SMEAR: NORMAL
PLATELET # BLD AUTO: 371 X10*3/UL (ref 150–450)
POLYCHROMASIA BLD QL SMEAR: NORMAL
RBC # BLD AUTO: 2.83 X10*6/UL (ref 4–5.2)
RBC MORPH BLD: NORMAL
WBC # BLD AUTO: 8.7 X10*3/UL (ref 4.4–11.3)

## 2024-02-13 PROCEDURE — 96372 THER/PROPH/DIAG INJ SC/IM: CPT | Performed by: NURSE PRACTITIONER

## 2024-02-13 PROCEDURE — 36415 COLL VENOUS BLD VENIPUNCTURE: CPT

## 2024-02-13 PROCEDURE — 2500000004 HC RX 250 GENERAL PHARMACY W/ HCPCS (ALT 636 FOR OP/ED): Mod: JZ,JG | Performed by: NURSE PRACTITIONER

## 2024-02-13 PROCEDURE — 96372 THER/PROPH/DIAG INJ SC/IM: CPT

## 2024-02-13 PROCEDURE — 85025 COMPLETE CBC W/AUTO DIFF WBC: CPT

## 2024-02-13 RX ORDER — DIPHENHYDRAMINE HYDROCHLORIDE 50 MG/ML
50 INJECTION INTRAMUSCULAR; INTRAVENOUS AS NEEDED
Status: DISCONTINUED | OUTPATIENT
Start: 2024-02-13 | End: 2024-02-13 | Stop reason: HOSPADM

## 2024-02-13 RX ORDER — DIPHENHYDRAMINE HYDROCHLORIDE 50 MG/ML
50 INJECTION INTRAMUSCULAR; INTRAVENOUS AS NEEDED
Status: CANCELLED | OUTPATIENT
Start: 2024-02-19

## 2024-02-13 RX ORDER — ALBUTEROL SULFATE 0.83 MG/ML
3 SOLUTION RESPIRATORY (INHALATION) AS NEEDED
Status: CANCELLED | OUTPATIENT
Start: 2024-02-19

## 2024-02-13 RX ORDER — FAMOTIDINE 10 MG/ML
20 INJECTION INTRAVENOUS ONCE AS NEEDED
Status: CANCELLED | OUTPATIENT
Start: 2024-02-19

## 2024-02-13 RX ORDER — ALBUTEROL SULFATE 0.83 MG/ML
3 SOLUTION RESPIRATORY (INHALATION) AS NEEDED
Status: DISCONTINUED | OUTPATIENT
Start: 2024-02-13 | End: 2024-02-13 | Stop reason: HOSPADM

## 2024-02-13 RX ORDER — EPINEPHRINE 0.3 MG/.3ML
0.3 INJECTION SUBCUTANEOUS EVERY 5 MIN PRN
Status: DISCONTINUED | OUTPATIENT
Start: 2024-02-13 | End: 2024-02-13 | Stop reason: HOSPADM

## 2024-02-13 RX ORDER — FAMOTIDINE 10 MG/ML
20 INJECTION INTRAVENOUS ONCE AS NEEDED
Status: DISCONTINUED | OUTPATIENT
Start: 2024-02-13 | End: 2024-02-13 | Stop reason: HOSPADM

## 2024-02-13 RX ORDER — EPINEPHRINE 0.3 MG/.3ML
0.3 INJECTION SUBCUTANEOUS EVERY 5 MIN PRN
Status: CANCELLED | OUTPATIENT
Start: 2024-02-19

## 2024-02-13 RX ADMIN — ERYTHROPOIETIN 80000 UNITS: 40000 INJECTION, SOLUTION INTRAVENOUS; SUBCUTANEOUS at 12:54

## 2024-02-13 ASSESSMENT — PAIN SCALES - GENERAL: PAINLEVEL: 0-NO PAIN

## 2024-02-13 NOTE — PROGRESS NOTES
Pt arrived awake, alert, oriented, no apparent distress with unlabored breaths. Pt reports feeling moderate fatigue, but otherwise feeling well without s/sx of illness. Pt here for her weekly Epoetin Fletcher injection, in which she received and tolerated well. Pt given AVS, no further questions or concerns, observed ambulating with walker to exit area for discharge home in stable condition

## 2024-02-14 ENCOUNTER — APPOINTMENT (OUTPATIENT)
Dept: CARDIOLOGY | Facility: HOSPITAL | Age: 79
DRG: 871 | End: 2024-02-14
Payer: MEDICARE

## 2024-02-14 ENCOUNTER — HOME CARE VISIT (OUTPATIENT)
Dept: HOME HEALTH SERVICES | Facility: HOME HEALTH | Age: 79
End: 2024-02-14

## 2024-02-14 ENCOUNTER — APPOINTMENT (OUTPATIENT)
Dept: RADIOLOGY | Facility: HOSPITAL | Age: 79
DRG: 871 | End: 2024-02-14
Payer: MEDICARE

## 2024-02-14 ENCOUNTER — HOSPITAL ENCOUNTER (INPATIENT)
Facility: HOSPITAL | Age: 79
LOS: 4 days | Discharge: HOME HEALTH | DRG: 871 | End: 2024-02-18
Attending: STUDENT IN AN ORGANIZED HEALTH CARE EDUCATION/TRAINING PROGRAM | Admitting: INTERNAL MEDICINE
Payer: MEDICARE

## 2024-02-14 DIAGNOSIS — R06.00 DYSPNEA, UNSPECIFIED: ICD-10-CM

## 2024-02-14 DIAGNOSIS — R09.02 HYPOXIA: ICD-10-CM

## 2024-02-14 DIAGNOSIS — R06.09 DYSPNEA ON EXERTION: ICD-10-CM

## 2024-02-14 DIAGNOSIS — I50.41 ACUTE COMBINED SYSTOLIC (CONGESTIVE) AND DIASTOLIC (CONGESTIVE) HEART FAILURE (MULTI): ICD-10-CM

## 2024-02-14 DIAGNOSIS — J96.01 ACUTE HYPOXIC RESPIRATORY FAILURE (MULTI): Primary | ICD-10-CM

## 2024-02-14 DIAGNOSIS — A41.9 SEPSIS WITHOUT SEPTIC SHOCK (MULTI): ICD-10-CM

## 2024-02-14 DIAGNOSIS — L03.031 CELLULITIS OF TOE OF RIGHT FOOT: ICD-10-CM

## 2024-02-14 DIAGNOSIS — L97.519 ULCER OF RIGHT FOOT, UNSPECIFIED ULCER STAGE (MULTI): ICD-10-CM

## 2024-02-14 DIAGNOSIS — I50.32 CHRONIC DIASTOLIC (CONGESTIVE) HEART FAILURE (MULTI): ICD-10-CM

## 2024-02-14 DIAGNOSIS — I50.33 ACUTE ON CHRONIC DIASTOLIC HEART FAILURE (MULTI): ICD-10-CM

## 2024-02-14 DIAGNOSIS — J81.0 ACUTE PULMONARY EDEMA (MULTI): ICD-10-CM

## 2024-02-14 PROBLEM — D64.9 ANEMIA, UNSPECIFIED: Chronic | Status: ACTIVE | Noted: 2023-08-22

## 2024-02-14 PROBLEM — E78.5 HYPERLIPIDEMIA: Chronic | Status: ACTIVE | Noted: 2023-05-04

## 2024-02-14 PROBLEM — J44.9 COPD WITHOUT EXACERBATION (MULTI): Chronic | Status: ACTIVE | Noted: 2023-09-02

## 2024-02-14 PROBLEM — I10 HYPERTENSION, ESSENTIAL: Chronic | Status: ACTIVE | Noted: 2023-05-04

## 2024-02-14 PROBLEM — E11.22 TYPE 2 DIABETES MELLITUS WITH STAGE 4 CHRONIC KIDNEY DISEASE, WITHOUT LONG-TERM CURRENT USE OF INSULIN (MULTI): Chronic | Status: ACTIVE | Noted: 2021-03-05

## 2024-02-14 PROBLEM — N18.4 TYPE 2 DIABETES MELLITUS WITH STAGE 4 CHRONIC KIDNEY DISEASE, WITHOUT LONG-TERM CURRENT USE OF INSULIN (MULTI): Chronic | Status: ACTIVE | Noted: 2021-03-05

## 2024-02-14 PROBLEM — N18.4 STAGE 4 CHRONIC KIDNEY DISEASE (MULTI): Chronic | Status: ACTIVE | Noted: 2023-05-04

## 2024-02-14 LAB
ABO GROUP (TYPE) IN BLOOD: NORMAL
ALBUMIN SERPL BCP-MCNC: 4 G/DL (ref 3.4–5)
ALP SERPL-CCNC: 66 U/L (ref 33–136)
ALT SERPL W P-5'-P-CCNC: 11 U/L (ref 7–45)
ANION GAP SERPL CALC-SCNC: 15 MMOL/L (ref 10–20)
ANTIBODY SCREEN: NORMAL
AST SERPL W P-5'-P-CCNC: 19 U/L (ref 9–39)
BASOPHILS # BLD AUTO: 0.08 X10*3/UL (ref 0–0.1)
BASOPHILS NFR BLD AUTO: 0.5 %
BILIRUB SERPL-MCNC: 0.6 MG/DL (ref 0–1.2)
BNP SERPL-MCNC: 100 PG/ML (ref 0–99)
BUN SERPL-MCNC: 54 MG/DL (ref 6–23)
CALCIUM SERPL-MCNC: 9.4 MG/DL (ref 8.6–10.3)
CARDIAC TROPONIN I PNL SERPL HS: 15 NG/L (ref 0–13)
CARDIAC TROPONIN I PNL SERPL HS: 16 NG/L (ref 0–13)
CHLORIDE SERPL-SCNC: 106 MMOL/L (ref 98–107)
CO2 SERPL-SCNC: 19 MMOL/L (ref 21–32)
CREAT SERPL-MCNC: 3.31 MG/DL (ref 0.5–1.05)
CRP SERPL-MCNC: 7.93 MG/DL
DACRYOCYTES BLD QL SMEAR: NORMAL
EGFRCR SERPLBLD CKD-EPI 2021: 14 ML/MIN/1.73M*2
EOSINOPHIL # BLD AUTO: 0.1 X10*3/UL (ref 0–0.4)
EOSINOPHIL NFR BLD AUTO: 0.6 %
ERYTHROCYTE [DISTWIDTH] IN BLOOD BY AUTOMATED COUNT: 25.4 % (ref 11.5–14.5)
ERYTHROCYTE [SEDIMENTATION RATE] IN BLOOD BY WESTERGREN METHOD: 63 MM/H (ref 0–30)
FLUAV RNA RESP QL NAA+PROBE: NOT DETECTED
FLUBV RNA RESP QL NAA+PROBE: NOT DETECTED
GLUCOSE BLD MANUAL STRIP-MCNC: 166 MG/DL (ref 74–99)
GLUCOSE SERPL-MCNC: 205 MG/DL (ref 74–99)
HCT VFR BLD AUTO: 27.1 % (ref 36–46)
HGB BLD-MCNC: 8.3 G/DL (ref 12–16)
HYPOCHROMIA BLD QL SMEAR: NORMAL
IMM GRANULOCYTES # BLD AUTO: 0.15 X10*3/UL (ref 0–0.5)
IMM GRANULOCYTES NFR BLD AUTO: 0.9 % (ref 0–0.9)
INR PPP: 1.3 (ref 0.9–1.1)
LACTATE SERPL-SCNC: 1.5 MMOL/L (ref 0.4–2)
LYMPHOCYTES # BLD AUTO: 1.02 X10*3/UL (ref 0.8–3)
LYMPHOCYTES NFR BLD AUTO: 6.1 %
MAGNESIUM SERPL-MCNC: 1.77 MG/DL (ref 1.6–2.4)
MCH RBC QN AUTO: 30.1 PG (ref 26–34)
MCHC RBC AUTO-ENTMCNC: 30.6 G/DL (ref 32–36)
MCV RBC AUTO: 98 FL (ref 80–100)
MONOCYTES # BLD AUTO: 1.06 X10*3/UL (ref 0.05–0.8)
MONOCYTES NFR BLD AUTO: 6.4 %
NEUTROPHILS # BLD AUTO: 14.22 X10*3/UL (ref 1.6–5.5)
NEUTROPHILS NFR BLD AUTO: 85.5 %
NRBC BLD-RTO: 0.8 /100 WBCS (ref 0–0)
OVALOCYTES BLD QL SMEAR: NORMAL
PHOSPHATE SERPL-MCNC: 4.3 MG/DL (ref 2.5–4.9)
PLATELET # BLD AUTO: 335 X10*3/UL (ref 150–450)
POLYCHROMASIA BLD QL SMEAR: NORMAL
POTASSIUM SERPL-SCNC: 4.3 MMOL/L (ref 3.5–5.3)
PROT SERPL-MCNC: 6.8 G/DL (ref 6.4–8.2)
PROTHROMBIN TIME: 14.4 SECONDS (ref 9.8–12.8)
RBC # BLD AUTO: 2.76 X10*6/UL (ref 4–5.2)
RBC MORPH BLD: NORMAL
RH FACTOR (ANTIGEN D): NORMAL
RSV RNA RESP QL NAA+PROBE: NOT DETECTED
SARS-COV-2 RNA RESP QL NAA+PROBE: NOT DETECTED
SODIUM SERPL-SCNC: 136 MMOL/L (ref 136–145)
WBC # BLD AUTO: 16.6 X10*3/UL (ref 4.4–11.3)

## 2024-02-14 PROCEDURE — 83880 ASSAY OF NATRIURETIC PEPTIDE: CPT | Performed by: STUDENT IN AN ORGANIZED HEALTH CARE EDUCATION/TRAINING PROGRAM

## 2024-02-14 PROCEDURE — 86140 C-REACTIVE PROTEIN: CPT | Performed by: STUDENT IN AN ORGANIZED HEALTH CARE EDUCATION/TRAINING PROGRAM

## 2024-02-14 PROCEDURE — 1200000002 HC GENERAL ROOM WITH TELEMETRY DAILY

## 2024-02-14 PROCEDURE — 73630 X-RAY EXAM OF FOOT: CPT | Mod: RIGHT SIDE | Performed by: RADIOLOGY

## 2024-02-14 PROCEDURE — 36415 COLL VENOUS BLD VENIPUNCTURE: CPT | Performed by: STUDENT IN AN ORGANIZED HEALTH CARE EDUCATION/TRAINING PROGRAM

## 2024-02-14 PROCEDURE — 93005 ELECTROCARDIOGRAM TRACING: CPT

## 2024-02-14 PROCEDURE — 80053 COMPREHEN METABOLIC PANEL: CPT | Performed by: STUDENT IN AN ORGANIZED HEALTH CARE EDUCATION/TRAINING PROGRAM

## 2024-02-14 PROCEDURE — 85610 PROTHROMBIN TIME: CPT | Performed by: STUDENT IN AN ORGANIZED HEALTH CARE EDUCATION/TRAINING PROGRAM

## 2024-02-14 PROCEDURE — 96374 THER/PROPH/DIAG INJ IV PUSH: CPT

## 2024-02-14 PROCEDURE — 84100 ASSAY OF PHOSPHORUS: CPT | Performed by: STUDENT IN AN ORGANIZED HEALTH CARE EDUCATION/TRAINING PROGRAM

## 2024-02-14 PROCEDURE — 87040 BLOOD CULTURE FOR BACTERIA: CPT | Mod: PORLAB | Performed by: STUDENT IN AN ORGANIZED HEALTH CARE EDUCATION/TRAINING PROGRAM

## 2024-02-14 PROCEDURE — 86901 BLOOD TYPING SEROLOGIC RH(D): CPT | Performed by: STUDENT IN AN ORGANIZED HEALTH CARE EDUCATION/TRAINING PROGRAM

## 2024-02-14 PROCEDURE — 83605 ASSAY OF LACTIC ACID: CPT | Performed by: STUDENT IN AN ORGANIZED HEALTH CARE EDUCATION/TRAINING PROGRAM

## 2024-02-14 PROCEDURE — 71046 X-RAY EXAM CHEST 2 VIEWS: CPT

## 2024-02-14 PROCEDURE — 2500000004 HC RX 250 GENERAL PHARMACY W/ HCPCS (ALT 636 FOR OP/ED): Performed by: STUDENT IN AN ORGANIZED HEALTH CARE EDUCATION/TRAINING PROGRAM

## 2024-02-14 PROCEDURE — 87637 SARSCOV2&INF A&B&RSV AMP PRB: CPT | Performed by: STUDENT IN AN ORGANIZED HEALTH CARE EDUCATION/TRAINING PROGRAM

## 2024-02-14 PROCEDURE — 96361 HYDRATE IV INFUSION ADD-ON: CPT

## 2024-02-14 PROCEDURE — 71046 X-RAY EXAM CHEST 2 VIEWS: CPT | Performed by: RADIOLOGY

## 2024-02-14 PROCEDURE — 83735 ASSAY OF MAGNESIUM: CPT | Performed by: STUDENT IN AN ORGANIZED HEALTH CARE EDUCATION/TRAINING PROGRAM

## 2024-02-14 PROCEDURE — 85652 RBC SED RATE AUTOMATED: CPT | Performed by: STUDENT IN AN ORGANIZED HEALTH CARE EDUCATION/TRAINING PROGRAM

## 2024-02-14 PROCEDURE — 73630 X-RAY EXAM OF FOOT: CPT | Mod: RT

## 2024-02-14 PROCEDURE — 96375 TX/PRO/DX INJ NEW DRUG ADDON: CPT

## 2024-02-14 PROCEDURE — 84484 ASSAY OF TROPONIN QUANT: CPT | Performed by: STUDENT IN AN ORGANIZED HEALTH CARE EDUCATION/TRAINING PROGRAM

## 2024-02-14 PROCEDURE — 85025 COMPLETE CBC W/AUTO DIFF WBC: CPT | Performed by: STUDENT IN AN ORGANIZED HEALTH CARE EDUCATION/TRAINING PROGRAM

## 2024-02-14 PROCEDURE — 99223 1ST HOSP IP/OBS HIGH 75: CPT | Performed by: STUDENT IN AN ORGANIZED HEALTH CARE EDUCATION/TRAINING PROGRAM

## 2024-02-14 PROCEDURE — 99285 EMERGENCY DEPT VISIT HI MDM: CPT | Mod: 25 | Performed by: STUDENT IN AN ORGANIZED HEALTH CARE EDUCATION/TRAINING PROGRAM

## 2024-02-14 PROCEDURE — 82947 ASSAY GLUCOSE BLOOD QUANT: CPT

## 2024-02-14 RX ORDER — LANOLIN ALCOHOL/MO/W.PET/CERES
1000 CREAM (GRAM) TOPICAL DAILY
Status: DISCONTINUED | OUTPATIENT
Start: 2024-02-14 | End: 2024-02-18 | Stop reason: HOSPADM

## 2024-02-14 RX ORDER — METRONIDAZOLE 500 MG/100ML
500 INJECTION, SOLUTION INTRAVENOUS EVERY 8 HOURS
Status: DISCONTINUED | OUTPATIENT
Start: 2024-02-14 | End: 2024-02-15

## 2024-02-14 RX ORDER — VANCOMYCIN HYDROCHLORIDE 1 G/200ML
1000 INJECTION, SOLUTION INTRAVENOUS ONCE
Status: COMPLETED | OUTPATIENT
Start: 2024-02-14 | End: 2024-02-14

## 2024-02-14 RX ORDER — INSULIN LISPRO 100 [IU]/ML
0-15 INJECTION, SOLUTION INTRAVENOUS; SUBCUTANEOUS
Status: DISCONTINUED | OUTPATIENT
Start: 2024-02-14 | End: 2024-02-18 | Stop reason: HOSPADM

## 2024-02-14 RX ORDER — CEFEPIME 1 G/50ML
2 INJECTION, SOLUTION INTRAVENOUS EVERY 24 HOURS
Status: DISCONTINUED | OUTPATIENT
Start: 2024-02-14 | End: 2024-02-14

## 2024-02-14 RX ORDER — ASPIRIN 81 MG/1
81 TABLET ORAL DAILY
Status: DISCONTINUED | OUTPATIENT
Start: 2024-02-14 | End: 2024-02-18 | Stop reason: HOSPADM

## 2024-02-14 RX ORDER — POLYETHYLENE GLYCOL 3350 17 G/17G
17 POWDER, FOR SOLUTION ORAL DAILY
Status: DISCONTINUED | OUTPATIENT
Start: 2024-02-14 | End: 2024-02-18 | Stop reason: HOSPADM

## 2024-02-14 RX ORDER — FLUTICASONE PROPIONATE 50 MCG
1 SPRAY, SUSPENSION (ML) NASAL DAILY
Status: DISCONTINUED | OUTPATIENT
Start: 2024-02-14 | End: 2024-02-18 | Stop reason: HOSPADM

## 2024-02-14 RX ORDER — PANTOPRAZOLE SODIUM 40 MG/1
40 TABLET, DELAYED RELEASE ORAL
Status: DISCONTINUED | OUTPATIENT
Start: 2024-02-15 | End: 2024-02-18 | Stop reason: HOSPADM

## 2024-02-14 RX ORDER — ONDANSETRON 4 MG/1
4 TABLET, FILM COATED ORAL EVERY 8 HOURS PRN
Status: DISCONTINUED | OUTPATIENT
Start: 2024-02-14 | End: 2024-02-18 | Stop reason: HOSPADM

## 2024-02-14 RX ORDER — ONDANSETRON HYDROCHLORIDE 2 MG/ML
4 INJECTION, SOLUTION INTRAVENOUS EVERY 8 HOURS PRN
Status: DISCONTINUED | OUTPATIENT
Start: 2024-02-14 | End: 2024-02-18 | Stop reason: HOSPADM

## 2024-02-14 RX ORDER — PANTOPRAZOLE SODIUM 40 MG/10ML
40 INJECTION, POWDER, LYOPHILIZED, FOR SOLUTION INTRAVENOUS
Status: DISCONTINUED | OUTPATIENT
Start: 2024-02-15 | End: 2024-02-18 | Stop reason: HOSPADM

## 2024-02-14 RX ORDER — DEXTROSE MONOHYDRATE 100 MG/ML
0.3 INJECTION, SOLUTION INTRAVENOUS ONCE AS NEEDED
Status: DISCONTINUED | OUTPATIENT
Start: 2024-02-14 | End: 2024-02-18 | Stop reason: HOSPADM

## 2024-02-14 RX ORDER — PRAVASTATIN SODIUM 40 MG/1
40 TABLET ORAL NIGHTLY
Status: DISCONTINUED | OUTPATIENT
Start: 2024-02-14 | End: 2024-02-18 | Stop reason: HOSPADM

## 2024-02-14 RX ORDER — FUROSEMIDE 10 MG/ML
40 INJECTION INTRAMUSCULAR; INTRAVENOUS 2 TIMES DAILY
Status: DISCONTINUED | OUTPATIENT
Start: 2024-02-15 | End: 2024-02-16

## 2024-02-14 RX ORDER — AMLODIPINE BESYLATE 5 MG/1
5 TABLET ORAL DAILY
Status: DISCONTINUED | OUTPATIENT
Start: 2024-02-15 | End: 2024-02-18 | Stop reason: HOSPADM

## 2024-02-14 RX ORDER — FUROSEMIDE 10 MG/ML
40 INJECTION INTRAMUSCULAR; INTRAVENOUS ONCE
Status: COMPLETED | OUTPATIENT
Start: 2024-02-14 | End: 2024-02-14

## 2024-02-14 RX ORDER — HEPARIN SODIUM 5000 [USP'U]/ML
5000 INJECTION, SOLUTION INTRAVENOUS; SUBCUTANEOUS EVERY 8 HOURS
Status: DISCONTINUED | OUTPATIENT
Start: 2024-02-14 | End: 2024-02-15

## 2024-02-14 RX ORDER — METOPROLOL TARTRATE 25 MG/1
25 TABLET, FILM COATED ORAL DAILY
Status: DISCONTINUED | OUTPATIENT
Start: 2024-02-15 | End: 2024-02-18 | Stop reason: HOSPADM

## 2024-02-14 RX ORDER — GUAIFENESIN 600 MG/1
600 TABLET, EXTENDED RELEASE ORAL EVERY 12 HOURS PRN
Status: DISCONTINUED | OUTPATIENT
Start: 2024-02-14 | End: 2024-02-18 | Stop reason: HOSPADM

## 2024-02-14 RX ORDER — DEXTROSE 50 % IN WATER (D50W) INTRAVENOUS SYRINGE
25
Status: DISCONTINUED | OUTPATIENT
Start: 2024-02-14 | End: 2024-02-18 | Stop reason: HOSPADM

## 2024-02-14 RX ORDER — MECLIZINE HCL 12.5 MG 12.5 MG/1
12.5 TABLET ORAL 2 TIMES DAILY PRN
Status: DISCONTINUED | OUTPATIENT
Start: 2024-02-14 | End: 2024-02-18 | Stop reason: HOSPADM

## 2024-02-14 RX ORDER — TALC
3 POWDER (GRAM) TOPICAL DAILY
Status: DISCONTINUED | OUTPATIENT
Start: 2024-02-14 | End: 2024-02-18 | Stop reason: HOSPADM

## 2024-02-14 RX ORDER — BISACODYL 5 MG
10 TABLET, DELAYED RELEASE (ENTERIC COATED) ORAL DAILY PRN
Status: DISCONTINUED | OUTPATIENT
Start: 2024-02-14 | End: 2024-02-18 | Stop reason: HOSPADM

## 2024-02-14 RX ORDER — ACETAMINOPHEN 325 MG/1
650 TABLET ORAL EVERY 4 HOURS PRN
Status: DISCONTINUED | OUTPATIENT
Start: 2024-02-14 | End: 2024-02-18 | Stop reason: HOSPADM

## 2024-02-14 RX ADMIN — METRONIDAZOLE 500 MG: 500 INJECTION, SOLUTION INTRAVENOUS at 23:06

## 2024-02-14 RX ADMIN — VANCOMYCIN HYDROCHLORIDE 1000 MG: 1 INJECTION, SOLUTION INTRAVENOUS at 19:56

## 2024-02-14 RX ADMIN — FUROSEMIDE 40 MG: 10 INJECTION, SOLUTION INTRAMUSCULAR; INTRAVENOUS at 19:37

## 2024-02-14 RX ADMIN — SODIUM CHLORIDE 500 ML: 9 INJECTION, SOLUTION INTRAVENOUS at 18:22

## 2024-02-14 SDOH — SOCIAL STABILITY: SOCIAL INSECURITY: WERE YOU ABLE TO COMPLETE ALL THE BEHAVIORAL HEALTH SCREENINGS?: YES

## 2024-02-14 SDOH — SOCIAL STABILITY: SOCIAL INSECURITY: ABUSE: ADULT

## 2024-02-14 SDOH — SOCIAL STABILITY: SOCIAL INSECURITY: HAVE YOU HAD THOUGHTS OF HARMING ANYONE ELSE?: NO

## 2024-02-14 ASSESSMENT — ENCOUNTER SYMPTOMS
HEADACHES: 0
ABDOMINAL PAIN: 0
FREQUENCY: 0
RECTAL PAIN: 0
COUGH: 0
FATIGUE: 1
CHEST TIGHTNESS: 0
ABDOMINAL DISTENTION: 0
CONSTIPATION: 1
CHILLS: 0
ACTIVITY CHANGE: 1
RHINORRHEA: 0
SORE THROAT: 0
FEVER: 0
COLOR CHANGE: 1
LIGHT-HEADEDNESS: 0
WEAKNESS: 1
SINUS PAIN: 0
VOMITING: 1
PALPITATIONS: 0
DYSURIA: 0
DIZZINESS: 0
NERVOUS/ANXIOUS: 0
DIFFICULTY URINATING: 0
FLANK PAIN: 0
DECREASED CONCENTRATION: 0
APPETITE CHANGE: 1
TREMORS: 0
FACIAL ASYMMETRY: 0
SHORTNESS OF BREATH: 1
CONFUSION: 0
BLOOD IN STOOL: 0
NAUSEA: 1
MYALGIAS: 1
NUMBNESS: 0
WHEEZING: 0
SPEECH DIFFICULTY: 0
SINUS PRESSURE: 0
SEIZURES: 0
WOUND: 1
JOINT SWELLING: 1
DIAPHORESIS: 0
DIARRHEA: 1

## 2024-02-14 ASSESSMENT — PAIN DESCRIPTION - PROGRESSION: CLINICAL_PROGRESSION: NOT CHANGED

## 2024-02-14 ASSESSMENT — ACTIVITIES OF DAILY LIVING (ADL)
ASSISTIVE_DEVICE: WALKER
DRESSING YOURSELF: INDEPENDENT
HEARING - LEFT EAR: FUNCTIONAL
HEARING - RIGHT EAR: FUNCTIONAL
PATIENT'S MEMORY ADEQUATE TO SAFELY COMPLETE DAILY ACTIVITIES?: YES
FEEDING YOURSELF: INDEPENDENT
GROOMING: INDEPENDENT
BATHING: INDEPENDENT
JUDGMENT_ADEQUATE_SAFELY_COMPLETE_DAILY_ACTIVITIES: YES
ADEQUATE_TO_COMPLETE_ADL: YES
TOILETING: INDEPENDENT
WALKS IN HOME: INDEPENDENT

## 2024-02-14 ASSESSMENT — COGNITIVE AND FUNCTIONAL STATUS - GENERAL
PATIENT BASELINE BEDBOUND: NO
MOBILITY SCORE: 24
DAILY ACTIVITIY SCORE: 24

## 2024-02-14 ASSESSMENT — PAIN DESCRIPTION - ONSET: ONSET: ONGOING

## 2024-02-14 ASSESSMENT — PAIN SCALES - GENERAL
PAINLEVEL_OUTOF10: 0 - NO PAIN
PAINLEVEL_OUTOF10: 6

## 2024-02-14 ASSESSMENT — PAIN - FUNCTIONAL ASSESSMENT
PAIN_FUNCTIONAL_ASSESSMENT: 0-10
PAIN_FUNCTIONAL_ASSESSMENT: 0-10

## 2024-02-14 ASSESSMENT — PAIN DESCRIPTION - PAIN TYPE: TYPE: ACUTE PAIN

## 2024-02-14 ASSESSMENT — LIFESTYLE VARIABLES
AUDIT-C TOTAL SCORE: 0
SKIP TO QUESTIONS 9-10: 1
HOW OFTEN DO YOU HAVE 6 OR MORE DRINKS ON ONE OCCASION: NEVER
AUDIT-C TOTAL SCORE: 0
HAVE PEOPLE ANNOYED YOU BY CRITICIZING YOUR DRINKING: NO
HOW MANY STANDARD DRINKS CONTAINING ALCOHOL DO YOU HAVE ON A TYPICAL DAY: PATIENT DOES NOT DRINK
HOW OFTEN DO YOU HAVE A DRINK CONTAINING ALCOHOL: NEVER
EVER HAD A DRINK FIRST THING IN THE MORNING TO STEADY YOUR NERVES TO GET RID OF A HANGOVER: NO
HAVE YOU EVER FELT YOU SHOULD CUT DOWN ON YOUR DRINKING: NO
EVER FELT BAD OR GUILTY ABOUT YOUR DRINKING: NO

## 2024-02-14 ASSESSMENT — PAIN DESCRIPTION - ORIENTATION: ORIENTATION: RIGHT

## 2024-02-14 ASSESSMENT — PAIN DESCRIPTION - FREQUENCY: FREQUENCY: CONSTANT/CONTINUOUS

## 2024-02-14 ASSESSMENT — PAIN DESCRIPTION - DESCRIPTORS: DESCRIPTORS: ACHING

## 2024-02-14 ASSESSMENT — PAIN DESCRIPTION - LOCATION: LOCATION: FOOT

## 2024-02-14 NOTE — PROGRESS NOTES
Tana Hargrove is a 78 y.o. female admitted for No Principal Problem: There is no principal problem currently on the Problem List. Please update the Problem List and refresh.. Pharmacy reviewed the patient's [id number] medications and allergies for accuracy.    The list below reflects the PTA list prior to pharmacy medication history. A summary a changes to the PTA medication list has been listed below. Please review each medication in order reconciliation for additional clarification and justification.    Source of information:  Discharge paperwork 02/11/2024    No Changes!    Medications added:    Medications modified:    Medications to be removed:    Medications of concern:      Prior to Admission Medications   Prescriptions Last Dose Informant Patient Reported? Taking?   SITagliptin phosphate (Januvia) 100 mg tablet   No No   Sig: Take 1 tablet (100 mg) by mouth once daily.   acetaminophen (Tylenol) 500 mg tablet   Yes No   Sig: Take by mouth every 8 hours if needed.   allopurinol (Zyloprim) 100 mg tablet   No No   Sig: Take 1 tablet (100 mg) by mouth once daily.   amLODIPine (Norvasc) 5 mg tablet   Yes No   Sig: Take 1 tablet (5 mg) by mouth once daily.   aspirin 81 mg EC tablet   Yes No   Sig: Take 1 tablet (81 mg) by mouth once daily.   cholecalciferol (Vitamin D-3) 50 MCG (2000 UT) tablet   Yes No   Sig: Take 1 tablet (2,000 Units) by mouth once daily.   cyanocobalamin (Vitamin B-12) 1,000 mcg tablet   Yes No   Sig: Take 1 tablet (1,000 mcg) by mouth once daily.   epoetin alejandra (Procrit) 10,000 unit/mL injection   Yes No   Sig: Pt gets every Monday.  PT IS UNSURE   fluticasone (Flonase) 50 mcg/actuation nasal spray   Yes No   Sig: Administer 1 spray into each nostril once daily.   furosemide (Lasix) 20 mg tablet   No No   Sig: TAKE 1 TABLET BY MOUTH ONCE DAILY   meclizine (Antivert) 12.5 mg tablet   No No   Sig: Take 1 tablet (12.5 mg) by mouth 2 times a day as needed for dizziness.   metoprolol  tartrate (Lopressor) 25 mg tablet   Yes No   Sig: Take by mouth. PT IS UNSURE IF TAKING NO FILLS WITH PHARMACY   pioglitazone (Actos) 15 mg tablet   No No   Sig: Take 1 tablet (15 mg) by mouth once daily.   pravastatin (Pravachol) 40 mg tablet   No No   Sig: Take 1 tablet (40 mg) by mouth once daily at bedtime.   pregabalin (Lyrica) 100 mg capsule   No No   Sig: TAKE ONE CAPSULE BY MOUTH TWICE DAILY @9AM & 5PM      Facility-Administered Medications Last Administration Doses Remaining   sodium chloride 0.9% flush 10 mL None recorded           Keena Flores, CPhT

## 2024-02-14 NOTE — ED PROVIDER NOTES
HPI   Chief Complaint   Patient presents with    Weakness, Gen       Tana Hargrove is a 78 y.o. female with a past medical history of NIDDM2, CKD, HLD, myelodysplastic syndrome, and COPD presenting to the emergency department with shortness of breath, diarrhea, weakness, and generalized fatigue. The patient states that she woke up this morning feeling great but at noon she had an episode of diarrhea and began to feel weak and fatigued.  She states that her symptoms have progressed since which led to her calling EMS. Upon EMS arrival, the patient was noted to be hypoxic and placed on 2 L via nasal cannula.  The patient denies any  chest pain, nausea, vomiting, abdominal pain, or headache.  The patient does endorse a wound on her right foot with suspected infection into her big toe.  She states that the wound has been treated by her primary care physician but she now notes swelling, redness, and warmth around the wound site.      History provided by:  Patient   used: No                        [place] Coma Scale Score: 14                     Patient History   Past Medical History:   Diagnosis Date    Abnormal levels of other serum enzymes     Elevated liver enzymes    Acute kidney failure (CMS/HCC)     Anemia     CKD (chronic kidney disease)     COPD (chronic obstructive pulmonary disease) (CMS/HCC)     Disease of blood and blood-forming organs, unspecified     Bone marrow disorder    HLD (hyperlipidemia)     MDS (myelodysplastic syndrome) (CMS/HCC)     Personal history of other diseases of the musculoskeletal system and connective tissue     History of muscle pain    Personal history of other specified conditions     History of insomnia    Personal history of other specified conditions     History of edema    Type 2 diabetes mellitus (CMS/HCC)      Past Surgical History:   Procedure Laterality Date    APPENDECTOMY      BREAST BIOPSY Left     left excisional    BREAST LUMPECTOMY      COLONOSCOPY       HYSTERECTOMY      MR HEAD ANGIO WO IV CONTRAST  11/20/2020    MR HEAD ANGIO WO IV CONTRAST [facility]20/2020 [facility]    MR NECK ANGIO WO IV CONTRAST  11/20/2020    MR NECK ANGIO WO IV CONTRAST 11/20/2020 [facility]    OOPHORECTOMY      OTHER SURGICAL HISTORY  12/13/2019    Oophorectomy bilateral    OTHER SURGICAL HISTORY  12/13/2019    Tubal ligation    OTHER SURGICAL HISTORY  12/13/2019    Knee replacement    OTHER SURGICAL HISTORY  12/13/2019    Shoulder surgery    OTHER SURGICAL HISTORY  12/13/2019    Hysterectomy    OTHER SURGICAL HISTORY  12/13/2019    Lumpectomy    OTHER SURGICAL HISTORY  12/13/2019    Foot surgery    OTHER SURGICAL HISTORY  04/16/2021    Back surgery     No family history on file.  Social History     Tobacco Use    Smoking status: Never    Smokeless tobacco: Never   Vaping Use    Vaping Use: Never used   Substance Use Topics    Alcohol use: Not Currently    Drug use: Never       Physical Exam   ED Triage Vitals [02/14/24 1643]   Temperature Heart Rate Respirations BP   36.7 °C (98.1 °F) 100 14 122/56      Pulse Ox Temp Source Heart Rate Source Patient Position   (!) 92 % Temporal Monitor Lying      BP Location FiO2 (%)     Left arm --       Physical Exam  Vitals and nursing note reviewed.   Constitutional:       General: She is not in acute distress.     Appearance: She is well-developed. She is obese. She is ill-appearing and toxic-appearing.   HENT:      Head: Normocephalic and atraumatic.      Right Ear: External ear normal.      Left Ear: External ear normal.      Nose: Nose normal.      Mouth/Throat:      Mouth: Mucous membranes are moist.      Pharynx: Oropharynx is clear.   Eyes:      Extraocular Movements: Extraocular movements intact.      Conjunctiva/sclera: Conjunctivae normal.      Pupils: Pupils are equal, round, and reactive to light.   Cardiovascular:      Rate and Rhythm: Regular rhythm. Tachycardia present.      Pulses: Normal pulses.      Heart sounds:  Normal heart sounds. No murmur heard.  Pulmonary:      Effort: Pulmonary effort is normal. No respiratory distress.      Breath sounds: Normal breath sounds.   Abdominal:      General: Bowel sounds are normal. There is no distension.      Palpations: Abdomen is soft.      Tenderness: There is no abdominal tenderness.   Musculoskeletal:         General: No swelling. Normal range of motion.      Cervical back: Normal range of motion and neck supple.        Feet:    Feet:      Right foot:      Skin integrity: Ulcer present.      Comments: ulceration on right foot with redness and swelling into the big toe  Skin:     General: Skin is warm and dry.      Capillary Refill: Capillary refill takes less than 2 seconds.   Neurological:      General: No focal deficit present.      Mental Status: She is oriented to person, place, and time. Mental status is at baseline.      Sensory: Sensation is intact.      Motor: Motor function is intact.      Coordination: Coordination is intact.   Psychiatric:         Mood and Affect: Mood normal.         Behavior: Behavior normal.         ED Course & MDM        Medical Decision Making  Differential diagnoses include ulceration, cellulitis, pneumonia, urinary tract infection, and sepsis. Las were ordered including CBC, CMP, phosphorus, magnesium, type & screen, CRP, sedimentation rate, PT-INR, troponin, blood cultures, COVID/influenza PCR, RSV PCR, and lactate.  Imaging was ordered including x-ray of the chest and x-ray to the right foot. EKG ordered.    Amount and/or Complexity of Data Reviewed  Labs: ordered.  Radiology: ordered.        Procedure  Procedures     Juan Vargas  02/14/24 4213

## 2024-02-15 ENCOUNTER — APPOINTMENT (OUTPATIENT)
Dept: CARDIOLOGY | Facility: HOSPITAL | Age: 79
DRG: 871 | End: 2024-02-15
Payer: MEDICARE

## 2024-02-15 ENCOUNTER — HOME CARE VISIT (OUTPATIENT)
Dept: HOME HEALTH SERVICES | Facility: HOME HEALTH | Age: 79
End: 2024-02-15

## 2024-02-15 ENCOUNTER — APPOINTMENT (OUTPATIENT)
Dept: RADIOLOGY | Facility: HOSPITAL | Age: 79
DRG: 871 | End: 2024-02-15
Payer: MEDICARE

## 2024-02-15 LAB
ANION GAP SERPL CALC-SCNC: 12 MMOL/L (ref 10–20)
APPEARANCE UR: ABNORMAL
BACTERIA #/AREA URNS AUTO: ABNORMAL /HPF
BILIRUB UR STRIP.AUTO-MCNC: NEGATIVE MG/DL
BUN SERPL-MCNC: 55 MG/DL (ref 6–23)
CALCIUM SERPL-MCNC: 8.5 MG/DL (ref 8.6–10.3)
CHLORIDE SERPL-SCNC: 110 MMOL/L (ref 98–107)
CHLORIDE UR-SCNC: 80 MMOL/L
CHLORIDE/CREATININE (MMOL/G) IN URINE: 139 MMOL/G CREAT (ref 38–318)
CO2 SERPL-SCNC: 21 MMOL/L (ref 21–32)
COLOR UR: YELLOW
CREAT SERPL-MCNC: 3.33 MG/DL (ref 0.5–1.05)
CREAT UR-MCNC: 57.7 MG/DL (ref 20–320)
CREAT UR-MCNC: 57.7 MG/DL (ref 20–320)
EGFRCR SERPLBLD CKD-EPI 2021: 14 ML/MIN/1.73M*2
EJECTION FRACTION APICAL 4 CHAMBER: 69.1
EJECTION FRACTION: 67 %
ERYTHROCYTE [DISTWIDTH] IN BLOOD BY AUTOMATED COUNT: 25.2 % (ref 11.5–14.5)
GLUCOSE BLD MANUAL STRIP-MCNC: 113 MG/DL (ref 74–99)
GLUCOSE BLD MANUAL STRIP-MCNC: 137 MG/DL (ref 74–99)
GLUCOSE BLD MANUAL STRIP-MCNC: 156 MG/DL (ref 74–99)
GLUCOSE BLD MANUAL STRIP-MCNC: 88 MG/DL (ref 74–99)
GLUCOSE SERPL-MCNC: 120 MG/DL (ref 74–99)
GLUCOSE UR STRIP.AUTO-MCNC: NEGATIVE MG/DL
HCT VFR BLD AUTO: 22 % (ref 36–46)
HGB BLD-MCNC: 6.8 G/DL (ref 12–16)
KETONES UR STRIP.AUTO-MCNC: NEGATIVE MG/DL
LEFT ATRIUM VOLUME AREA LENGTH INDEX BSA: 45.2 ML/M2
LEFT VENTRICLE INTERNAL DIMENSION DIASTOLE: 4.3 CM (ref 3.5–6)
LEFT VENTRICULAR OUTFLOW TRACT DIAMETER: 1.8 CM
LEUKOCYTE ESTERASE UR QL STRIP.AUTO: ABNORMAL
MAGNESIUM SERPL-MCNC: 1.68 MG/DL (ref 1.6–2.4)
MCH RBC QN AUTO: 30.1 PG (ref 26–34)
MCHC RBC AUTO-ENTMCNC: 30.9 G/DL (ref 32–36)
MCV RBC AUTO: 97 FL (ref 80–100)
MITRAL VALVE E/A RATIO: 0.84
MITRAL VALVE E/E' RATIO: 13.48
NITRITE UR QL STRIP.AUTO: NEGATIVE
NRBC BLD-RTO: 0.9 /100 WBCS (ref 0–0)
OSMOLALITY SERPL: 309 MOSM/KG (ref 280–300)
OSMOLALITY UR: 315 MOSM/KG (ref 200–1200)
PH UR STRIP.AUTO: 5 [PH]
PLATELET # BLD AUTO: 259 X10*3/UL (ref 150–450)
POTASSIUM SERPL-SCNC: 4.1 MMOL/L (ref 3.5–5.3)
POTASSIUM UR-SCNC: 17 MMOL/L
POTASSIUM/CREAT UR-RTO: 29 MMOL/G CREAT
PROT UR STRIP.AUTO-MCNC: ABNORMAL MG/DL
RBC # BLD AUTO: 2.26 X10*6/UL (ref 4–5.2)
RBC # UR STRIP.AUTO: NEGATIVE /UL
RBC #/AREA URNS AUTO: ABNORMAL /HPF
RIGHT VENTRICLE FREE WALL PEAK S': 13 CM/S
RIGHT VENTRICLE PEAK SYSTOLIC PRESSURE: 38.8 MMHG
SODIUM SERPL-SCNC: 139 MMOL/L (ref 136–145)
SODIUM UR-SCNC: 83 MMOL/L
SODIUM UR-SCNC: 83 MMOL/L
SODIUM/CREAT UR-RTO: 144 MMOL/G CREAT
SODIUM/CREAT UR-RTO: 144 MMOL/G CREAT
SP GR UR STRIP.AUTO: 1.01
SQUAMOUS #/AREA URNS AUTO: ABNORMAL /HPF
TRICUSPID ANNULAR PLANE SYSTOLIC EXCURSION: 2.2 CM
URATE SERPL-MCNC: 8.6 MG/DL (ref 2.3–6.7)
UROBILINOGEN UR STRIP.AUTO-MCNC: <2 MG/DL
VANCOMYCIN SERPL-MCNC: 10.7 UG/ML (ref 5–20)
WBC # BLD AUTO: 7.7 X10*3/UL (ref 4.4–11.3)
WBC #/AREA URNS AUTO: ABNORMAL /HPF

## 2024-02-15 PROCEDURE — 2500000004 HC RX 250 GENERAL PHARMACY W/ HCPCS (ALT 636 FOR OP/ED): Performed by: INTERNAL MEDICINE

## 2024-02-15 PROCEDURE — 83735 ASSAY OF MAGNESIUM: CPT | Performed by: STUDENT IN AN ORGANIZED HEALTH CARE EDUCATION/TRAINING PROGRAM

## 2024-02-15 PROCEDURE — 2500000001 HC RX 250 WO HCPCS SELF ADMINISTERED DRUGS (ALT 637 FOR MEDICARE OP): Performed by: STUDENT IN AN ORGANIZED HEALTH CARE EDUCATION/TRAINING PROGRAM

## 2024-02-15 PROCEDURE — 99233 SBSQ HOSP IP/OBS HIGH 50: CPT | Performed by: INTERNAL MEDICINE

## 2024-02-15 PROCEDURE — 2500000004 HC RX 250 GENERAL PHARMACY W/ HCPCS (ALT 636 FOR OP/ED): Performed by: STUDENT IN AN ORGANIZED HEALTH CARE EDUCATION/TRAINING PROGRAM

## 2024-02-15 PROCEDURE — 2500000002 HC RX 250 W HCPCS SELF ADMINISTERED DRUGS (ALT 637 FOR MEDICARE OP, ALT 636 FOR OP/ED): Performed by: STUDENT IN AN ORGANIZED HEALTH CARE EDUCATION/TRAINING PROGRAM

## 2024-02-15 PROCEDURE — 36415 COLL VENOUS BLD VENIPUNCTURE: CPT | Performed by: STUDENT IN AN ORGANIZED HEALTH CARE EDUCATION/TRAINING PROGRAM

## 2024-02-15 PROCEDURE — 93306 TTE W/DOPPLER COMPLETE: CPT

## 2024-02-15 PROCEDURE — 80202 ASSAY OF VANCOMYCIN: CPT | Performed by: STUDENT IN AN ORGANIZED HEALTH CARE EDUCATION/TRAINING PROGRAM

## 2024-02-15 PROCEDURE — 2500000004 HC RX 250 GENERAL PHARMACY W/ HCPCS (ALT 636 FOR OP/ED)

## 2024-02-15 PROCEDURE — 80048 BASIC METABOLIC PNL TOTAL CA: CPT | Performed by: STUDENT IN AN ORGANIZED HEALTH CARE EDUCATION/TRAINING PROGRAM

## 2024-02-15 PROCEDURE — 93306 TTE W/DOPPLER COMPLETE: CPT | Performed by: INTERNAL MEDICINE

## 2024-02-15 PROCEDURE — 1200000002 HC GENERAL ROOM WITH TELEMETRY DAILY

## 2024-02-15 PROCEDURE — 83935 ASSAY OF URINE OSMOLALITY: CPT | Mod: PORLAB | Performed by: STUDENT IN AN ORGANIZED HEALTH CARE EDUCATION/TRAINING PROGRAM

## 2024-02-15 PROCEDURE — 85027 COMPLETE CBC AUTOMATED: CPT | Performed by: STUDENT IN AN ORGANIZED HEALTH CARE EDUCATION/TRAINING PROGRAM

## 2024-02-15 PROCEDURE — 84300 ASSAY OF URINE SODIUM: CPT | Performed by: STUDENT IN AN ORGANIZED HEALTH CARE EDUCATION/TRAINING PROGRAM

## 2024-02-15 PROCEDURE — 93005 ELECTROCARDIOGRAM TRACING: CPT

## 2024-02-15 PROCEDURE — 73718 MRI LOWER EXTREMITY W/O DYE: CPT | Mod: RT

## 2024-02-15 PROCEDURE — 81003 URINALYSIS AUTO W/O SCOPE: CPT | Performed by: STUDENT IN AN ORGANIZED HEALTH CARE EDUCATION/TRAINING PROGRAM

## 2024-02-15 PROCEDURE — 82436 ASSAY OF URINE CHLORIDE: CPT | Performed by: STUDENT IN AN ORGANIZED HEALTH CARE EDUCATION/TRAINING PROGRAM

## 2024-02-15 PROCEDURE — 84550 ASSAY OF BLOOD/URIC ACID: CPT | Performed by: STUDENT IN AN ORGANIZED HEALTH CARE EDUCATION/TRAINING PROGRAM

## 2024-02-15 PROCEDURE — 73718 MRI LOWER EXTREMITY W/O DYE: CPT | Mod: RIGHT SIDE | Performed by: STUDENT IN AN ORGANIZED HEALTH CARE EDUCATION/TRAINING PROGRAM

## 2024-02-15 PROCEDURE — 83930 ASSAY OF BLOOD OSMOLALITY: CPT | Mod: PORLAB | Performed by: STUDENT IN AN ORGANIZED HEALTH CARE EDUCATION/TRAINING PROGRAM

## 2024-02-15 PROCEDURE — 82947 ASSAY GLUCOSE BLOOD QUANT: CPT

## 2024-02-15 RX ORDER — METRONIDAZOLE 500 MG/100ML
500 INJECTION, SOLUTION INTRAVENOUS EVERY 8 HOURS
Status: DISCONTINUED | OUTPATIENT
Start: 2024-02-15 | End: 2024-02-18 | Stop reason: HOSPADM

## 2024-02-15 RX ORDER — VANCOMYCIN HYDROCHLORIDE 1 G/20ML
INJECTION, POWDER, LYOPHILIZED, FOR SOLUTION INTRAVENOUS DAILY PRN
Status: DISCONTINUED | OUTPATIENT
Start: 2024-02-15 | End: 2024-02-18 | Stop reason: HOSPADM

## 2024-02-15 RX ORDER — VANCOMYCIN HYDROCHLORIDE 750 MG/150ML
750 INJECTION, SOLUTION INTRAVENOUS ONCE
Status: COMPLETED | OUTPATIENT
Start: 2024-02-15 | End: 2024-02-15

## 2024-02-15 RX ADMIN — VANCOMYCIN HYDROCHLORIDE 750 MG: 750 INJECTION, SOLUTION INTRAVENOUS at 22:20

## 2024-02-15 RX ADMIN — ASPIRIN 81 MG: 81 TABLET, COATED ORAL at 09:37

## 2024-02-15 RX ADMIN — FUROSEMIDE 40 MG: 10 INJECTION, SOLUTION INTRAMUSCULAR; INTRAVENOUS at 16:43

## 2024-02-15 RX ADMIN — METOPROLOL TARTRATE 25 MG: 25 TABLET, FILM COATED ORAL at 09:37

## 2024-02-15 RX ADMIN — FUROSEMIDE 40 MG: 10 INJECTION, SOLUTION INTRAMUSCULAR; INTRAVENOUS at 09:49

## 2024-02-15 RX ADMIN — PRAVASTATIN SODIUM 40 MG: 40 TABLET ORAL at 20:09

## 2024-02-15 RX ADMIN — INSULIN LISPRO 3 UNITS: 100 INJECTION, SOLUTION INTRAVENOUS; SUBCUTANEOUS at 11:56

## 2024-02-15 RX ADMIN — CYANOCOBALAMIN TAB 1000 MCG 1000 MCG: 1000 TAB at 09:37

## 2024-02-15 RX ADMIN — CEFEPIME 500 MG: 1 INJECTION, POWDER, FOR SOLUTION INTRAMUSCULAR; INTRAVENOUS at 11:56

## 2024-02-15 RX ADMIN — HEPARIN SODIUM 5000 UNITS: 5000 INJECTION INTRAVENOUS; SUBCUTANEOUS at 05:58

## 2024-02-15 RX ADMIN — METRONIDAZOLE 500 MG: 500 INJECTION, SOLUTION INTRAVENOUS at 11:51

## 2024-02-15 RX ADMIN — METRONIDAZOLE 500 MG: 500 INJECTION, SOLUTION INTRAVENOUS at 20:09

## 2024-02-15 RX ADMIN — CEFEPIME 500 MG: 1 INJECTION, POWDER, FOR SOLUTION INTRAMUSCULAR; INTRAVENOUS at 21:33

## 2024-02-15 RX ADMIN — AMLODIPINE BESYLATE 5 MG: 5 TABLET ORAL at 09:36

## 2024-02-15 ASSESSMENT — COGNITIVE AND FUNCTIONAL STATUS - GENERAL
STANDING UP FROM CHAIR USING ARMS: A LITTLE
MOVING TO AND FROM BED TO CHAIR: A LITTLE
MOBILITY SCORE: 24
DRESSING REGULAR LOWER BODY CLOTHING: A LITTLE
TURNING FROM BACK TO SIDE WHILE IN FLAT BAD: A LITTLE
CLIMB 3 TO 5 STEPS WITH RAILING: A LITTLE
MOBILITY SCORE: 19
HELP NEEDED FOR BATHING: A LITTLE
DAILY ACTIVITIY SCORE: 22
WALKING IN HOSPITAL ROOM: A LITTLE
DAILY ACTIVITIY SCORE: 24

## 2024-02-15 ASSESSMENT — PAIN SCALES - GENERAL
PAINLEVEL_OUTOF10: 0 - NO PAIN
PAINLEVEL_OUTOF10: 0 - NO PAIN

## 2024-02-15 ASSESSMENT — PAIN - FUNCTIONAL ASSESSMENT
PAIN_FUNCTIONAL_ASSESSMENT: 0-10
PAIN_FUNCTIONAL_ASSESSMENT: 0-10

## 2024-02-15 ASSESSMENT — ACTIVITIES OF DAILY LIVING (ADL): LACK_OF_TRANSPORTATION: PATIENT DECLINED

## 2024-02-15 NOTE — PROGRESS NOTES
Tana Hargrove is a 78 y.o. female on day 1 of admission presenting with Sepsis without septic shock (CMS/Formerly [facility]).    Subjective   Tana Hargrove is a 78 y.o. female with PMHx s/f HTN, HLD, chronic diastolic heart failure, COPD (no baseline O2), CKD, NIDDM-II, MDS with chronic anemia requiring Procrit and intermittent PRBC transfusions, OA, GERD, who presents with multiple complaints.  Patient reports that her initial concerns this morning were generalized weakness, fatigue, nausea and a very large loose bowel movement.  She states that she had not had a bowel movement urinary however most recent hospitalization from 02/07/2024 till 02/11/2024 for ESTEE on CKD possibly in setting of myelodysplastic syndrome, urinary tract infection, poor p.o. intake of food/water.  She he had one episode of nonbloody nonbilious emesis, and her symptoms continue to worsen so she came in for additional evaluation. She also notes that she has been having worsening pain, redness, swelling associated with a wound on the bottom of her right big toe; which she reports seeing her podiatrist (Dr. Pyle) on a weekly basis.  She states that she has not seen Dr. Pyle in some time due to her most recent hospitalization.  She states that when she awoke this morning she felt very weak, generally unwell and has had very poor p.o. intake of food/water throughout the day.  She then called EMS to be brought in for further evaluation and management.  EMS had found her to be hypoxic and she was placed on 2 L via nasal cannula but otherwise her only other complaint was worsening right foot pain/swelling and redness and a significant amount of drainage.  She states that it was not draining during her most recent hospitalization but ever since returning home she has had a significant amount of yellow/green material expressed down on her sheets and socks.  She denied any additional injury to the area but did admit to a history of diabetes mellitus with  neuropathy.  She denies any recent sick contacts, chemical/environmental exposures, changes in dietary habits or any recent traumatic events/falls other than noted above.  She denies any fevers, chills, night sweats, vision changes, auditory changes, change in taste/smell, loss of bowel/bladder control, loss consciousness, dizziness, vertigo, syncope, seizure-like activity, chest pain, palpitations, coughing, wheezing, congestion, hemoptysis, hematemesis, abdominal pain, dysuria, hematuria, dyschezia, hematochezia any lateralizing motor/sensory deficits other than noted above.     ED Course (Summary):   Vitals on presentation: T98.1, /56, , RR 14, SpO2 92% RA (dropped to less than 89% on room air per discussion with the ED and required 2 L nasal cannula)  Labs: CBC with differential notable for WBC 16.6 with neutrophil predominance, Hgb 8.3, platelets 335.  Chemistries with glucose 205, sodium 136, potassium 4.3, BUN 54, serum creatinine 3.31, mag 1.77.  Lactate 1.5.  CRP 7.93/ESR 63.  High-sensitivity troponin 15.  .  Viral panel negative  Imaging: CXR with pulmonary edema. XR right foot with Severe osteoarthritis, soft tissue swelling (findings may reflect gout - MRI for additional correlation)  Interventions: 500 cc normal saline bolus, vancomycin/meropenem, 40 mg IV push Lasix               Objective     A Comprehensive greater than 10 system review of systems was carried out.  Pertinent positives and negatives are noted above.  Otherwise negative for contributory information.       Constitutional:       General: She is not in acute distress.     Appearance: Normal appearance. She is not ill-appearing or diaphoretic.   HENT:      Head: Normocephalic and atraumatic.      Mouth/Throat:      Mouth: Mucous membranes are moist.      Pharynx: Oropharynx is clear.   Eyes:      Extraocular Movements: Extraocular movements intact.      Conjunctiva/sclera: Conjunctivae normal.      Pupils: Pupils are  equal, round, and reactive to light.   Neck:      Vascular: No carotid bruit.   Cardiovascular:      Rate and Rhythm: Normal rate and regular rhythm.      Pulses: Normal pulses.      Heart sounds: Murmur heard.      Comments: Faint ejection murmur heard best at the left lower sternal border  Pulmonary:      Comments: Diminished breath sounds are all lung fields   Abdominal:      General: Abdomen is flat. Bowel sounds are normal. There is no distension.      Palpations: Abdomen is soft. There is no mass.      Tenderness: There is no abdominal tenderness. There is no guarding or rebound.   Musculoskeletal:      Cervical back: Normal range of motion and neck supple. No rigidity or tenderness.      Right lower leg: Edema present.      Left lower leg: Edema present.      Comments: 2+ pitting edema of bilateral lower extremities to just below the knee, upper extremity strength/range of motion is likely a 4 out of 5 with the lower extremities being ever so slightly less otherwise symmetric   Skin:     General: Skin is warm and dry.      Capillary Refill: Capillary refill takes less than 2 seconds.      Findings: Erythema and lesion present. No bruising or rash.      Comments: Patient does have an open wound at the base of the right great toe on the plantar aspect with minimal drainage that appears serosanguineous at this time but she does have significant erythema and swelling of the right great toe into the midfoot with slight tenderness to palpation in the midfoot but minimal tenderness to palpation around the wound and she states that she is barely able to tell me that she feels me palpating in that area.  She has a similar wound on the left foot but this is dry and not erythematous or swollen and is in there appears to be an eschar in the area as well.,  Intact pulses in the DP/PT of bilateral lower extremities   Neurological:      Mental Status: She is alert and oriented to person, place, and time.      Cranial  Nerves: No cranial nerve deficit.      Sensory: Sensory deficit present.      Motor: No weakness.      Coordination: Coordination normal.      Gait: Gait normal.      Comments: AOx4, finger-to-nose/heel-to-shin grossly intact bilaterally, range of motion/strength appears grossly intact but diminished in the upper extremities at about 4 out of 5 and slightly worse on the lower extremities.  Patient was able to sit up to the side of bed with minimal assistance and stand without assistance but was somewhat unsteady at which time she was placed back in bed pending further evaluation and management from an infectious standpoint she does have decreased sensation of bilateral lower extremities in the feet and she was barely able to tell me that I was touching her and did require a significant amount of pressure to elicit painful stimuli.   Psychiatric:         Mood and Affect: Mood normal.         Behavior: Behavior normal.         Thought Content: Thought content normal.         Judgment: Judgment normal.        Last Recorded Vitals  Blood pressure 152/67, pulse 83, temperature 36.8 °C (98.3 °F), temperature source Temporal, resp. rate 20, height 1.524 m (5'), weight 75.7 kg (166 lb 14.2 oz), SpO2 95 %.  Intake/Output last 3 Shifts:  I/O last 3 completed shifts:  In: 1000 (13.2 mL/kg) [IV Piggyback:1000]  Out: 150 (2 mL/kg) [Urine:150 (0.1 mL/kg/hr)]  Weight: 75.7 kg     Relevant Results                             Assessment/Plan   Sepsis without shock 2/2 diabetic foot wound with overlying cellulitis and OM of the R great toe and R 3rd toe, POA  -SIRS criteria (2/4): WBCs, HR   -Source: DM foot wound/skin and soft tissue infection/OM  -End-organ dysfunction: ESTEE/CKD (but this has been variable)  -Lactate 1.5  -BP is normotensive  -Blood cultures x2. UA pending. Wound culture. Follow fever curve, WBCs.  -Continue antibiotic coverage with Vanc/Cefepime/Flagyl as per sepsis guidelines in Zosyn intolerant patient   -Wound  care consult appreciated   -Podiatry and ID consults     Acute on chronic diastolic heart failure   -Evidenced by presenting sxs: LE edema  -CXR with: pulmonary edema   -Echocardiogram update ordered, last completed 2022  -BNP: 100; mostly peripherally edematous  -Initiate lasix 40mg IVP BID  -Monitor renal function closely with diuresis   -Monitor electrolytes and replenish generously as needed   -Strict I&O’s   -2g salt restriction, 2L fluid restriction   -Monitor fluid status closely   -Tele  -Consider cardiology consultation, will hold for now and reassess pending echo     Acute hypoxic respiratory failure   -Currently requiring 2L NC to maintain > 89% (hypoxia not recorded in the ED)   -Suspect 2/2 volume overload   -Management as above, wean as tolerated       CKD IV  -Baseline sCr has been variable since the beginning of the year, ~2.6-3.4  -On admission: 3.31 with BUN 54  -Follow UOP / Is & Os  -Neph consulting     HTN, HLD  -Continue home therapies      COPD without acute exacerbation   -Continue home therapies      NIDDM-II with hyperglycemia   -SSI ACHS  -Accucheks   -Hypoglycemic protocol   -Monitor and adjust as needed     MDS with chronic anemia requiring Procrit and intermittent PRBC transfusions  -Hgb / anemia near baseline   -Continue to follow while admitted (on 2/15 Hbg did dip to 6.8, slightly below her average, this appears to be dilutional as compared to the 2/14 CBC. To this end I will hold off on transfusion today and repeat Hbg tomorrow. If it remains <7.0 I will transfuse.      Generalized weakness, acute on chronic deconditioning   -Likely 2/2 above   -Continue management  -PT/OT/CM consults appreciated, patient adamantly refuses SNF.       Pollo Herbert MD PhD

## 2024-02-15 NOTE — PROGRESS NOTES
"Vancomycin Dosing by Pharmacy- INITIAL    Tana Hargrove is a 78 y.o. year old female who Pharmacy has been consulted for vancomycin dosing for declining. Based on the patient's indication and renal status this patient will be dosed based on a goal trough/random level of 10-15.     Renal function is currently declining.    Visit Vitals  /58   Pulse 84   Temp 36.7 °C (98.1 °F) (Temporal)   Resp (!) 23        Lab Results   Component Value Date    CREATININE 3.31 (H) 02/14/2024    CREATININE 2.65 (H) 02/11/2024    CREATININE 2.54 (H) 02/10/2024    CREATININE 2.72 (H) 02/09/2024        Patient weight is No results found for: \"PTWEIGHT\"    No results found for: \"CULTURE\"     I/O last 3 completed shifts:  In: 500 (6.4 mL/kg) [IV Piggyback:500]  Out: - (0 mL/kg)   Weight: 78.3 kg   [unfilled]    Lab Results   Component Value Date    PATIENTTEMP 37.0 02/08/2024    PATIENTTEMP 37.0 08/11/2023    PATIENTTEMP 37.0 04/07/2022    PATIENTTEMP 37.0 04/06/2022          Assessment/Plan     Patient has already been given a loading dose of 1000 mg.  Will dose by level  Follow-up level will be ordered on 2/15 at 2000 unless clinically indicated sooner.  Will continue to monitor renal function daily while on vancomycin and order serum creatinine at least every 48 hours if not already ordered.  Follow for continued vancomycin needs, clinical response, and signs/symptoms of toxicity.       Helga Mccarthy, PharmD       "

## 2024-02-15 NOTE — CONSULTS
Nephrology Consult Note                                                                                                                                         Inpatient consult to Nephrology  Consult performed by: Pinky Christie DO  Consult ordered by: Pollo Herbert MD PhD                                                                                                               HPI  Patient is a 78 y.o. female who is admitted to hospital with complaints of   weakness. Nephrology consulted in view of ESTEE CKD stage IV.   Patient is well-known to me.  She has history of diabetic nephropathy.  She also has mild dysplastic syndrome and is followed closely by hematology.  Patient was recently admitted and discharged on 8 February after she had weakness in her legs from a blood transfusion.  She had mild ESTEE, was given some IV fluids and diuretics held.  She presents this time with increased weakness and concern with foot infection.  She did have an MRI this morning that showed acute osteomyelitis of her great toe and third toe.  There was concern for volume overload and she was given IV diuretics last night.  She did require 2 L of O2 by nasal cannula.  She has been taking her Lasix at home.  -She denies shortness of breath for me, she does have mild leg swelling    Past Medical History:   Diagnosis Date    Abnormal levels of other serum enzymes     Elevated liver enzymes    Acute kidney failure (CMS/HCC)     Anemia     CKD (chronic kidney disease)     COPD (chronic obstructive pulmonary disease) (CMS/HCC)     Disease of blood and blood-forming organs, unspecified     Bone marrow disorder    HLD (hyperlipidemia)     MDS (myelodysplastic syndrome) (CMS/HCC)     Personal history of other diseases of the musculoskeletal system and connective tissue     History of muscle pain    Personal  history of other specified conditions     History of insomnia    Personal history of other specified conditions     History of edema    Type 2 diabetes mellitus (CMS/[facility])       Social History     Socioeconomic History    Marital status:      Spouse name: Not on file    Number of children: Not on file    Years of education: Not on file    Highest education level: Not on file   Occupational History    Not on file   Tobacco Use    Smoking status: Never    Smokeless tobacco: Never   Vaping Use    Vaping Use: Never used   Substance and Sexual Activity    Alcohol use: Not Currently    Drug use: Never    Sexual activity: Not on file   Other Topics Concern    Not on file   Social History Narrative    Not on file     Social Determinants of Health     Financial Resource Strain: Patient Declined (2/15/2024)    Overall Financial Resource Strain (CARDIA)     Difficulty of Paying Living Expenses: Patient declined   Food Insecurity: No Food Insecurity (10/10/2023)    Hunger Vital Sign     Worried About Running Out of Food in the Last Year: Never true     Ran Out of Food in the Last Year: Never true   Transportation Needs: Patient Declined (2/15/2024)    PRAPARE - Transportation     Lack of Transportation (Medical): Patient declined     Lack of Transportation (Non-Medical): Patient declined   Physical Activity: Not on file   Stress: Not on file   Social Connections: Not on file   Intimate Partner Violence: Not on file   Housing Stability: Patient Declined (2/15/2024)    Housing Stability Vital Sign     Unable to Pay for Housing in the Last Year: Patient declined     Number of Places Lived in the Last Year: 1     Unstable Housing in the Last Year: Patient declined      No family history on file.   Current Facility-Administered Medications on File Prior to Encounter   Medication Dose Route Frequency Provider Last Rate Last Admin    albuterol 2.5 mg /3 mL (0.083 %) nebulizer solution 3 mL  3 mL nebulization PRN Pinky M Casal,  APRN-CNP, DNP        dextrose 5 % in water (D5W) bolus  500 mL intravenous PRN Rosanne M Casal, APRN-CNP, DNP        diphenhydrAMINE (BENADryl) injection 50 mg  50 mg intravenous PRN Rosanne M Casal, APRN-CNP, DNP        EPINEPHrine (Epipen) injection syringe 0.3 mg  0.3 mg intramuscular q5 min PRN Rosanne M Casal, APRN-CNP, DNP        famotidine PF (Pepcid) injection 20 mg  20 mg intravenous Once PRN Rosanne M Casal, APRN-CNP, DNP        methylPREDNISolone sod suc / (SOLU-Medrol) 40 mg injection 40 mg  40 mg intravenous PRN Rosanne M Casal, APRN-CNP, DNP        sodium chloride 0.9 % bolus 500 mL  500 mL intravenous PRN Rosanne M Casal, APRN-CNP, DNP         Current Outpatient Medications on File Prior to Encounter   Medication Sig Dispense Refill    acetaminophen (Tylenol) 500 mg tablet Take by mouth every 8 hours if needed.      allopurinol (Zyloprim) 100 mg tablet Take 1 tablet (100 mg) by mouth once daily. 90 tablet 1    amLODIPine (Norvasc) 5 mg tablet Take 1 tablet (5 mg) by mouth once daily.      aspirin 81 mg EC tablet Take 1 tablet (81 mg) by mouth once daily.      cholecalciferol (Vitamin D-3) 50 MCG (2000 UT) tablet Take 1 tablet (2,000 Units) by mouth once daily.      cyanocobalamin (Vitamin B-12) 1,000 mcg tablet Take 1 tablet (1,000 mcg) by mouth once daily.      epoetin alejandra (Procrit) 10,000 unit/mL injection Pt gets every Monday.      fluticasone (Flonase) 50 mcg/actuation nasal spray Administer 1 spray into each nostril once daily.      furosemide (Lasix) 20 mg tablet TAKE 1 TABLET BY MOUTH ONCE DAILY 7 tablet 0    meclizine (Antivert) 12.5 mg tablet Take 1 tablet (12.5 mg) by mouth 2 times a day as needed for dizziness. 15 tablet 0    metoprolol tartrate (Lopressor) 25 mg tablet Take by mouth. PT IS UNSURE IF TAKING NO FILLS WITH PHARMACY      pioglitazone (Actos) 15 mg tablet Take 1 tablet (15 mg) by mouth once daily. 90 tablet 0    pravastatin (Pravachol) 40 mg tablet Take 1 tablet (40 mg) by  mouth once daily at bedtime. 90 tablet 0    pregabalin (Lyrica) 100 mg capsule TAKE ONE CAPSULE BY MOUTH TWICE DAILY @9AM & 5PM 60 capsule 0    SITagliptin phosphate (Januvia) 100 mg tablet Take 1 tablet (100 mg) by mouth once daily. 90 tablet 0      Scheduled medications  amLODIPine, 5 mg, oral, Daily  aspirin, 81 mg, oral, Daily  cefepime, 500 mg, intravenous, q12h  cyanocobalamin, 1,000 mcg, oral, Daily  fluticasone, 1 spray, Each Nostril, Daily  furosemide, 40 mg, intravenous, BID  heparin (porcine), 5,000 Units, subcutaneous, q8h  insulin lispro, 0-15 Units, subcutaneous, Before meals & nightly  melatonin, 3 mg, oral, Daily  metoprolol tartrate, 25 mg, oral, Daily  metroNIDAZOLE, 500 mg, intravenous, q8h  pantoprazole, 40 mg, oral, Daily before breakfast   Or  pantoprazole, 40 mg, intravenous, Daily before breakfast  polyethylene glycol, 17 g, oral, Daily  pravastatin, 40 mg, oral, Nightly      Continuous medications     PRN medications  PRN medications: acetaminophen, bisacodyl, dextrose 10 % in water (D10W), dextrose, glucagon, guaiFENesin, meclizine, ondansetron **OR** ondansetron, oxygen     Review of systems  as per HPI otherwise 10 point review systems negative    /67 (BP Location: Right arm, Patient Position: Lying)   Pulse 83   Temp 36.8 °C (98.3 °F) (Temporal)   Resp 20   Ht 1.524 m (5')   Wt 75.7 kg (166 lb 14.2 oz)   SpO2 95%   BMI 32.59 kg/m²     Input / Output:  24 HR:   Intake/Output Summary (Last 24 hours) at 2/15/2024 1720  Last data filed at 2/15/2024 1251  Gross per 24 hour   Intake 1150 ml   Output 150 ml   Net 1000 ml       Physical Exam   Alert and oriented x 3, NAD  EOMI  OP clear  Neck: supple, No JVD  CV: RRR without m/r/g  Lungs: CTA bilaterally  Abd: soft NT/ND +BS  Ext: 1+ lower extremity edema   Neuro: grossly intact  Skin: no rashes    Results from last 7 days   Lab Units 02/15/24  0555 02/14/24  1758 02/11/24  0513   SODIUM mmol/L 139 136 139   POTASSIUM mmol/L 4.1 4.3  4.1   CHLORIDE mmol/L 110* 106 108*   CO2 mmol/L 21 19* 23   BUN mg/dL 55* 54* 46*   CREATININE mg/dL 3.33* 3.31* 2.65*   GLUCOSE mg/dL 120* 205* 122*   CALCIUM mg/dL 8.5* 9.4 9.4        Results from last 7 days   Lab Units 02/15/24  0555 02/14/24  1758   SODIUM mmol/L 139 136   POTASSIUM mmol/L 4.1 4.3   CHLORIDE mmol/L 110* 106   CO2 mmol/L 21 19*   BUN mg/dL 55* 54*   CREATININE mg/dL 3.33* 3.31*   CALCIUM mg/dL 8.5* 9.4   PROTEIN TOTAL g/dL  --  6.8   BILIRUBIN TOTAL mg/dL  --  0.6   ALK PHOS U/L  --  66   ALT U/L  --  11   AST U/L  --  19   GLUCOSE mg/dL 120* 205*      Results from last 7 days   Lab Units 02/15/24  0555 02/14/24  1758 02/10/24  0443   MAGNESIUM mg/dL 1.68 1.77 1.65      Results from last 7 days   Lab Units 02/15/24  0555 02/14/24  1758 02/13/24  1227   WBC AUTO x10*3/uL 7.7 16.6* 8.7   HEMOGLOBIN g/dL 6.8* 8.3* 8.4*   HEMATOCRIT % 22.0* 27.1* 27.5*   PLATELETS AUTO x10*3/uL 259 335 371        XR chest 2 views   Final Result   1.  Findings suspicious for pulmonary edema.                  MACRO:   None        Signed by: Alon Ga 2/14/2024 7:20 PM   Dictation workstation:   [id number]      XR foot right 3+ views   Final Result   Severe osteoarthritis. Soft tissue swelling. Findings may reflect   gout. If persistent concern, consider MRI             MACRO:   None        Signed by: Alon Ga 2/14/2024 7:19 PM   Dictation workstation:   [id number]           Assessment:   Patient is 78 y.o. female who is admitted to hospital for weakness. Nephrology consulted in view of ESTEE on CKD.    Acute kidney injury on CKD stage IV  -Patient's baseline creatinine has been 2.6  -She does have a history of requiring dialysis in the past from December 2021 to March 2022.  -Recent creatinine 2.8 did bump to the 3.3 this admission  -UA with no evidence of infection at this time, 2+ protein which is not new.  -No recent hypotension or nephrotoxins      Anemia-acute on chronic  -Related to chronic  kidney disease and MDS  -She is on high-dose Procrit weekly  Recent transfusion     Hyperkalemia  -mild  -improved with potassium binder  Hypertension  -controlled     Volume status-appears euvolemic    Sepsis/ foot wound, MRI consistent with osteomyelitis     recs:  -Patient was given IV diuresis on admission.  -Will hold further diuresis and reassess in the a.m. for the need  -BMP in a.m.  -No need for renal placement therapy    Renal panel in a.m.    Please message me through [employer] chat with any questions or concerns.     Pinky Christie,   2/15/2024  5:20 PM         [facility]    [address], [address]   [place] [address]  Office: [phone]

## 2024-02-15 NOTE — PROGRESS NOTES
Physical Therapy                 Therapy Communication Note    Patient Name: Tana Hargrove  MRN: [id number]  Today's Date: 2/15/2024     Discipline: Physical Therapy    Missed Visit Reason: Missed Visit Reason: Patient placed on medical hold by floor nurse this date as going for MRI and episode of bloody stools this am. Physician notation this afternoon notes plan to transfuse if hemoglobin < 7 next blood draw.     Missed Time: Attempt    Comment: Hold PT evalution this date. PT will monitor medical status and follow up with nursing for PT eval when appropriate.

## 2024-02-15 NOTE — PROGRESS NOTES
02/15/24 1027   Discharge Planning   Living Arrangements Alone   Support Systems Children   Assistance Needed independent   Type of Residence Private residence   Home or Post Acute Services In home services   Type of Home Care Services Home nursing visits;Home OT;Home PT   Patient expects to be discharged to: Home   Does the patient need discharge transport arranged? No     Met with patient at bedside, introduced self and role as RN TCC. Patient lives at home alone. She is independent in her own care. She has 2 children that do assist her when needed. Patient has a PCP, Carlos Higgins in [place]. Patient admitted for foot wound, MRI pending, she follows with Dr Pyle outpatient. PT OT is pending, patient recently discharged, refusing SNF and chose [facility]. Anticipate a resumption of [facility] on discharge. Will alert Dr Herbert. TCC to continue to follow.

## 2024-02-15 NOTE — H&P
History Of Present Illness:  Tana Hargrove is a 78 y.o. female with PMHx s/f HTN, HLD, chronic diastolic heart failure, COPD (no baseline O2), CKD, NIDDM-II, MDS with chronic anemia requiring Procrit and intermittent PRBC transfusions, OA, GERD, who presents with multiple complaints.  Patient reports that her initial concerns this morning were generalized weakness, fatigue, nausea and a very large loose bowel movement.  She states that she had not had a bowel movement urinary however most recent hospitalization from 02/07/2024 till 02/11/2024 for ESTEE on CKD possibly in setting of myelodysplastic syndrome, urinary tract infection, poor p.o. intake of food/water.  She he had one episode of nonbloody nonbilious emesis, and her symptoms continue to worsen so she came in for additional evaluation. She also notes that she has been having worsening pain, redness, swelling associated with a wound on the bottom of her right big toe; which she reports seeing her podiatrist (Dr. Pyle) on a weekly basis.  She states that she has not seen Dr. Pyle in some time due to her most recent hospitalization.  She states that when she awoke this morning she felt very weak, generally unwell and has had very poor p.o. intake of food/water throughout the day.  She then called EMS to be brought in for further evaluation and management.  EMS had found her to be hypoxic and she was placed on 2 L via nasal cannula but otherwise her only other complaint was worsening right foot pain/swelling and redness and a significant amount of drainage.  She states that it was not draining during her most recent hospitalization but ever since returning home she has had a significant amount of yellow/green material expressed down on her sheets and socks.  She denied any additional injury to the area but did admit to a history of diabetes mellitus with neuropathy.  She denies any recent sick contacts, chemical/environmental exposures, changes in dietary  habits or any recent traumatic events/falls other than noted above.  She denies any fevers, chills, night sweats, vision changes, auditory changes, change in taste/smell, loss of bowel/bladder control, loss consciousness, dizziness, vertigo, syncope, seizure-like activity, chest pain, palpitations, coughing, wheezing, congestion, hemoptysis, hematemesis, abdominal pain, dysuria, hematuria, dyschezia, hematochezia any lateralizing motor/sensory deficits other than noted above.    ED Course (Summary):   Vitals on presentation: T98.1, /56, , RR 14, SpO2 92% RA (dropped to less than 89% on room air per discussion with the ED and required 2 L nasal cannula)  Labs: CBC with differential notable for WBC 16.6 with neutrophil predominance, Hgb 8.3, platelets 335.  Chemistries with glucose 205, sodium 136, potassium 4.3, BUN 54, serum creatinine 3.31, mag 1.77.  Lactate 1.5.  CRP 7.93/ESR 63.  High-sensitivity troponin 15.  .  Viral panel negative  Imaging: CXR with pulmonary edema. XR right foot with Severe osteoarthritis, soft tissue swelling (findings may reflect gout - MRI for additional correlation)  Interventions: 500 cc normal saline bolus, vancomycin/meropenem, 40 mg IV push Lasix    ED Course:  ED Course as of 02/14/24 2200 Wed Feb 14, 2024 1809 EKG is interpreted by myself demonstrates sinus rhythm with a ventricular rate of 89, normal axis, normal intervals, no evidence of an acute STEMI or malignant arrhythmia [NS]      ED Course User Index  [NS] Alton Márquez MD         Diagnoses as of 02/14/24 2200   Acute hypoxic respiratory failure (CMS/HCC)   Hypoxia   Acute pulmonary edema (CMS/HCC)   Ulcer of right foot, unspecified ulcer stage (CMS/HCC)     Relevant Results  Results for orders placed or performed during the hospital encounter of 02/14/24 (from the past 24 hour(s))   CBC and Auto Differential   Result Value Ref Range    WBC 16.6 (H) 4.4 - 11.3 x10*3/uL    nRBC 0.8 (H) 0.0 -  0.0 /100 WBCs    RBC 2.76 (L) 4.00 - 5.20 x10*6/uL    Hemoglobin 8.3 (L) 12.0 - 16.0 g/dL    Hematocrit 27.1 (L) 36.0 - 46.0 %    MCV 98 80 - 100 fL    MCH 30.1 26.0 - 34.0 pg    MCHC 30.6 (L) 32.0 - 36.0 g/dL    RDW 25.4 (H) 11.5 - 14.5 %    Platelets 335 150 - 450 x10*3/uL    Neutrophils % 85.5 40.0 - 80.0 %    Immature Granulocytes %, Automated 0.9 0.0 - 0.9 %    Lymphocytes % 6.1 13.0 - 44.0 %    Monocytes % 6.4 2.0 - 10.0 %    Eosinophils % 0.6 0.0 - 6.0 %    Basophils % 0.5 0.0 - 2.0 %    Neutrophils Absolute 14.22 (H) 1.60 - 5.50 x10*3/uL    Immature Granulocytes Absolute, Automated 0.15 0.00 - 0.50 x10*3/uL    Lymphocytes Absolute 1.02 0.80 - 3.00 x10*3/uL    Monocytes Absolute 1.06 (H) 0.05 - 0.80 x10*3/uL    Eosinophils Absolute 0.10 0.00 - 0.40 x10*3/uL    Basophils Absolute 0.08 0.00 - 0.10 x10*3/uL   Magnesium   Result Value Ref Range    Magnesium 1.77 1.60 - 2.40 mg/dL   Phosphorus   Result Value Ref Range    Phosphorus 4.3 2.5 - 4.9 mg/dL   Comprehensive metabolic panel   Result Value Ref Range    Glucose 205 (H) 74 - 99 mg/dL    Sodium 136 136 - 145 mmol/L    Potassium 4.3 3.5 - 5.3 mmol/L    Chloride 106 98 - 107 mmol/L    Bicarbonate 19 (L) 21 - 32 mmol/L    Anion Gap 15 10 - 20 mmol/L    Urea Nitrogen 54 (H) 6 - 23 mg/dL    Creatinine 3.31 (H) 0.50 - 1.05 mg/dL    eGFR 14 (L) >60 mL/min/1.73m*2    Calcium 9.4 8.6 - 10.3 mg/dL    Albumin 4.0 3.4 - 5.0 g/dL    Alkaline Phosphatase 66 33 - 136 U/L    Total Protein 6.8 6.4 - 8.2 g/dL    AST 19 9 - 39 U/L    Bilirubin, Total 0.6 0.0 - 1.2 mg/dL    ALT 11 7 - 45 U/L   Lactate   Result Value Ref Range    Lactate 1.5 0.4 - 2.0 mmol/L   Troponin I, High Sensitivity   Result Value Ref Range    Troponin I, High Sensitivity 15 (H) 0 - 13 ng/L   Sedimentation rate, automated   Result Value Ref Range    Sedimentation Rate 63 (H) 0 - 30 mm/h   C-reactive protein   Result Value Ref Range    C-Reactive Protein 7.93 (H) <1.00 mg/dL   Protime-INR   Result Value Ref  Range    Protime 14.4 (H) 9.8 - 12.8 seconds    INR 1.3 (H) 0.9 - 1.1   Type and Screen   Result Value Ref Range    ABO TYPE A     Rh TYPE POS     ANTIBODY SCREEN NEG    Sars-CoV-2 and Influenza A/B PCR   Result Value Ref Range    Flu A Result Not Detected Not Detected    Flu B Result Not Detected Not Detected    Coronavirus 2019, PCR Not Detected Not Detected   RSV PCR   Result Value Ref Range    RSV PCR Not Detected Not Detected   Morphology   Result Value Ref Range    RBC Morphology See Below     Polychromasia Mild     Hypochromia Mild     Ovalocytes Few     Teardrop Cells Few    B-Type Natriuretic Peptide   Result Value Ref Range     (H) 0 - 99 pg/mL   POCT GLUCOSE   Result Value Ref Range    POCT Glucose 166 (H) 74 - 99 mg/dL     *Note: Due to a large number of results and/or encounters for the requested time period, some results have not been displayed. A complete set of results can be found in Results Review.      XR chest 2 views    Result Date: 2/14/2024  Interpreted By:  Alon Ga, STUDY: XR CHEST 2 VIEWS;  2/14/2024 6:49 pm   INDICATION: Signs/Symptoms:SOB.   COMPARISON: 2 8 24   ACCESSION NUMBER(S): [id number]   ORDERING CLINICIAN: JAYSHREE ROSENTHAL   FINDINGS: Elevated right hemidiaphragm. Heart size is enlarged. Features suggesting edema. Underlying pneumonia not categorically excluded. Senescent changes.       1.  Findings suspicious for pulmonary edema.       MACRO: None   Signed by: Alon Ga 2/14/2024 7:20 PM Dictation workstation:   [id number]    XR foot right 3+ views    Result Date: 2/14/2024  Interpreted By:  Alon Ga, STUDY: XR FOOT RIGHT 3+ VIEWS; ;  2/14/2024 6:49 pm   INDICATION: Signs/Symptoms:wound.   COMPARISON: None.   ACCESSION NUMBER(S): [id number]   ORDERING CLINICIAN: JAYSHREE ROSENTHAL   FINDINGS: Severe right great toe osteoarthritis. Demineralization. Vascular calcification. Soft tissue swelling is seen.. Apparent nodular density seen in the  soft tissues. This could represent sequela of gout. MRI could confirm.       Severe osteoarthritis. Soft tissue swelling. Findings may reflect gout. If persistent concern, consider MRI     MACRO: None   Signed by: Alon Ga 2/14/2024 7:19 PM Dictation workstation:   [id number]    ECG 12 lead    Result Date: 2/10/2024  Sinus rhythm See ED provider note for full interpretation and clinical correlation Confirmed by Nadya Naylor ([id number]) on 2/10/2024 1:28:35 PM    US renal complete    Result Date: 2/8/2024  Interpreted By:  Jayy Hu, STUDY: US RENAL COMPLETE;  2/8/2024 11:26 am   INDICATION: Signs/Symptoms:Acute kidney injury concern about obstruction.   COMPARISON: None.   ACCESSION NUMBER(S): [id number]   ORDERING CLINICIAN: JOSSUE LERMA   TECHNIQUE: Multiple images of the kidneys were obtained  , with color Doppler for blood flow.   FINDINGS: RIGHT KIDNEY: The right kidney measures 10.3 cm in length.  There is moderate thinning of the cortex indicating atrophy. The cortex is also increased echogenicity probably from medical renal disease.. An approximate 1.1 cm cortical cyst is present at the mid/upper pole. No hydronephrosis or evidence of nephrolithiasis. Color Doppler demonstrates renal blood flow.   LEFT KIDNEY: The left kidney measures 11.3 cm in length. Again there is cortical atrophy with increased echogenicity suggesting medical renal disease..   No hydronephrosis or evidence of nephrolithiasis.. Color Doppler demonstrates renal blood flow.   BLADDER: Urinary bladder contour is smooth. There appears to be layering of some debris posteriorly.   OTHER FINDINGS: None significant.       Cortical atrophy and probable medical renal disease.   Signed by: Jayy Hu 2/8/2024 12:44 PM Dictation workstation:   [id number]    XR chest 2 views    Result Date: 2/8/2024  Interpreted By:  Finkelstein, Evan, STUDY: XR CHEST 2 VIEWS;  2/8/2024 1:12 am   INDICATION: Signs/Symptoms:SOB. Rales RLL.   COMPARISON:  Chest radiograph 08/14/2023   ACCESSION NUMBER(S): [id number]   ORDERING CLINICIAN: DAHIANA DALEY   FINDINGS: Low lung volumes with bronchovascular crowding.   CARDIOMEDIASTINAL SILHOUETTE: Cardiomediastinal silhouette is normal in size and configuration.   LUNGS: No pulmonary consolidation, pleural effusion or pneumothorax.   ABDOMEN: No remarkable upper abdominal findings.   BONES: No acute osseous abnormality.       Low lung volumes with bronchovascular crowding.   MACRO: None.   Signed by: Evan Finkelstein 2/8/2024 1:20 AM Dictation workstation:   [id number]     Scheduled medications:  [START ON 2/15/2024] amLODIPine, 5 mg, oral, Daily  aspirin, 81 mg, oral, Daily  [START ON 2/15/2024] cefepime, 500 mg, intravenous, q12h  cyanocobalamin, 1,000 mcg, oral, Daily  fluticasone, 1 spray, Each Nostril, Daily  [START ON 2/15/2024] furosemide, 40 mg, intravenous, BID  heparin (porcine), 5,000 Units, subcutaneous, q8h  insulin lispro, 0-15 Units, subcutaneous, Before meals & nightly  melatonin, 3 mg, oral, Daily  meropenem, 500 mg, intravenous, Once  [START ON 2/15/2024] metoprolol tartrate, 25 mg, oral, Daily  metroNIDAZOLE, 500 mg, intravenous, q8h  [START ON 2/15/2024] pantoprazole, 40 mg, oral, Daily before breakfast   Or  [START ON 2/15/2024] pantoprazole, 40 mg, intravenous, Daily before breakfast  polyethylene glycol, 17 g, oral, Daily  pravastatin, 40 mg, oral, Nightly      Continuous medications:     PRN medications:  PRN medications: acetaminophen, bisacodyl, dextrose 10 % in water (D10W), dextrose, glucagon, guaiFENesin, meclizine, ondansetron **OR** ondansetron, oxygen      Past Medical History  She has a past medical history of Abnormal levels of other serum enzymes, Acute kidney failure (CMS/HCC), Anemia, CKD (chronic kidney disease), COPD (chronic obstructive pulmonary disease) (CMS/HCC), Disease of blood and blood-forming organs, unspecified, HLD (hyperlipidemia), MDS (myelodysplastic syndrome)  (CMS/[facility]), Personal history of other diseases of the musculoskeletal system and connective tissue, Personal history of other specified conditions, Personal history of other specified conditions, and Type 2 diabetes mellitus (CMS/[facility]).    Surgical History  She has a past surgical history that includes Other surgical history (12/13/2019); Other surgical history (12/13/2019); Other surgical history (12/13/2019); Other surgical history (12/13/2019); Other surgical history (12/13/2019); Other surgical history (12/13/2019); Other surgical history (12/13/2019); Other surgical history (04/16/2021); MR angio head wo IV contrast (11/20/2020); MR angio neck wo IV contrast (11/20/2020); Breast biopsy (Left); Hysterectomy; Breast lumpectomy; Appendectomy; Colonoscopy; and Oophorectomy.     Social History  She reports that she has never smoked. She has never used smokeless tobacco. She reports that she does not currently use alcohol. She reports that she does not use drugs.    Family History  No family history on file.     Allergies  Codeine, Hydrocodone, Hydrocodone-acetaminophen, Meperidine (pf), Tramadol, Piperacillin-tazobactam, Adhesive tape-silicones, and Meperidine    Code Status  Full Code     Review of Systems   Constitutional:  Positive for activity change, appetite change and fatigue. Negative for chills, diaphoresis and fever.   HENT:  Negative for congestion, postnasal drip, rhinorrhea, sinus pressure, sinus pain, sneezing and sore throat.    Respiratory:  Positive for shortness of breath. Negative for cough, chest tightness and wheezing.    Cardiovascular:  Negative for chest pain, palpitations and leg swelling.   Gastrointestinal:  Positive for constipation, diarrhea, nausea and vomiting. Negative for abdominal distention, abdominal pain, blood in stool and rectal pain.   Genitourinary:  Negative for decreased urine volume, difficulty urinating, dysuria, flank pain, frequency and urgency.   Musculoskeletal:   Positive for gait problem, joint swelling and myalgias.   Skin:  Positive for color change and wound.   Neurological:  Positive for weakness. Negative for dizziness, tremors, seizures, syncope, facial asymmetry, speech difficulty, light-headedness, numbness and headaches.   Psychiatric/Behavioral:  Negative for confusion and decreased concentration. The patient is not nervous/anxious.    All other systems reviewed and are negative.      Last Recorded Vitals  /69 (BP Location: Left arm, Patient Position: Lying)   Pulse 82   Temp 35.9 °C (96.7 °F) (Temporal)   Resp 18   Wt 78.3 kg (172 lb 9.9 oz)   SpO2 100%      Physical Exam  Vitals and nursing note reviewed.   Constitutional:       General: She is not in acute distress.     Appearance: Normal appearance. She is not ill-appearing or diaphoretic.   HENT:      Head: Normocephalic and atraumatic.      Mouth/Throat:      Mouth: Mucous membranes are moist.      Pharynx: Oropharynx is clear.   Eyes:      Extraocular Movements: Extraocular movements intact.      Conjunctiva/sclera: Conjunctivae normal.      Pupils: Pupils are equal, round, and reactive to light.   Neck:      Vascular: No carotid bruit.   Cardiovascular:      Rate and Rhythm: Normal rate and regular rhythm.      Pulses: Normal pulses.      Heart sounds: Murmur heard.      Comments: Faint ejection murmur heard best at the left lower sternal border  Pulmonary:      Comments: Diminished breath sounds are all lung fields with patchy crackles particularly mid to lower lung fields but slightly worse on the right, nonlabored on room air, no increased work of breathing during discussion, symmetric chest movements  Abdominal:      General: Abdomen is flat. Bowel sounds are normal. There is no distension.      Palpations: Abdomen is soft. There is no mass.      Tenderness: There is no abdominal tenderness. There is no guarding or rebound.   Musculoskeletal:      Cervical back: Normal range of motion and  neck supple. No rigidity or tenderness.      Right lower leg: Edema present.      Left lower leg: Edema present.      Comments: 2+ pitting edema of bilateral lower extremities to just below the knee, upper extremity strength/range of motion is likely a 4 out of 5 with the lower extremities being ever so slightly less otherwise symmetric   Skin:     General: Skin is warm and dry.      Capillary Refill: Capillary refill takes less than 2 seconds.      Findings: Erythema and lesion present. No bruising or rash.      Comments: Patient does have an open wound at the base of the right great toe on the plantar aspect with minimal drainage that appears serosanguineous at this time but she does have significant erythema and swelling of the right great toe into the midfoot with slight tenderness to palpation in the midfoot but minimal tenderness to palpation around the wound and she states that she is barely able to tell me that she feels me palpating in that area.  She has a similar wound on the left foot but this is dry and not erythematous or swollen and is in there appears to be an eschar in the area as well.,  Intact pulses in the DP/PT of bilateral lower extremities   Neurological:      Mental Status: She is alert and oriented to person, place, and time.      Cranial Nerves: No cranial nerve deficit.      Sensory: Sensory deficit present.      Motor: No weakness.      Coordination: Coordination normal.      Gait: Gait normal.      Comments: AOx4, finger-to-nose/heel-to-shin grossly intact bilaterally, range of motion/strength appears grossly intact but diminished in the upper extremities at about 4 out of 5 and slightly worse on the lower extremities.  Patient was able to sit up to the side of bed with minimal assistance and stand without assistance but was somewhat unsteady at which time she was placed back in bed pending further evaluation and management from an infectious standpoint she does have decreased sensation  of bilateral lower extremities in the feet and she was barely able to tell me that I was touching her and did require a significant amount of pressure to elicit painful stimuli.   Psychiatric:         Mood and Affect: Mood normal.         Behavior: Behavior normal.         Thought Content: Thought content normal.         Judgment: Judgment normal.         Assessment/Plan   Principal Problem:    Sepsis without septic shock (CMS/[facility])  Active Problems:    Hypertension, essential    Hyperlipidemia    Stage 4 chronic kidney disease (CMS/[facility])    Type 2 diabetes mellitus with stage 4 chronic kidney disease, without long-term current use of insulin (CMS/[facility])    COPD without exacerbation (CMS/[facility])    Anemia, unspecified    Acute hypoxic respiratory failure (CMS/[facility])    Cellulitis of toe of right foot    Acute on chronic diastolic heart failure (CMS/[facility])    78 y.o. female with PMHx s/f HTN, HLD, chronic diastolic heart failure, COPD (no baseline O2), CKD, NIDDM-II, MDS with chronic anemia requiring Procrit and intermittent PRBC transfusions, OA, GERD, who presents with multiple complaints.  Patient reports that her initial concerns this morning were generalized weakness, fatigue, nausea; however, she had one episode of nonbloody nonbilious emesis, and her symptoms continue to worsen so she came in for additional evaluation.    Sepsis without shock 2/2 R great toe diabetic foot wound with overlying cellulitis, POA  -SIRS criteria (2/4): WBCs, HR   -Source: DM foot wound/skin and soft tissue infection  -End-organ dysfunction: ESTEE/CKD (but this has been variable)  -Lactate 1.5  -BP is normotensive  -Blood cultures x2. UA pending. Wound culture. Follow fever curve, WBCs.  -Continue antibiotic coverage with Vanc/Cefepime/Flagyl as per sepsis guidelines in Zosyn intolerant patient   -XR without definitive OM, does have elevated ESR/CRP. Checking MRI foot out of abundance of caution.  -Wound care consult appreciated   -Pending MRI  results may need to involve podiatry     Acute on chronic diastolic heart failure   -Evidenced by presenting sxs: LE edema  -CXR with: pulmonary edema   -Echocardiogram update ordered, last completed 2022  -BNP: 100; mostly peripherally edematous  -Initiate lasix 40mg IVP BID  -Monitor renal function closely with diuresis   -Monitor electrolytes and replenish generously as needed   -Strict I&O’s   -2g salt restriction, 2L fluid restriction   -Monitor fluid status closely   -Tele  -Consider cardiology consultation, will hold for now and reassess pending echo    Acute hypoxic respiratory failure   -Currently requiring 2L NC to maintain > 89% (hypoxia not recorded in the ED)   -Suspect 2/2 volume overload   -Management as above, wean as tolerated      CKD IV  -Baseline sCr has been variable since the beginning of the year, ~2.6-3.4  -On admission: 3.31 with BUN 54  -Will check UA, urine studies   -Will need to follow closely with diuresis --> if worsening, suspend lasix and consult nephrology   -Follow UOP / Is & Os    HTN, HLD  -Continue home therapies     COPD without acute exacerbation   -Continue home therapies     NIDDM-II with hyperglycemia   -SSI ACHS  -Accucheks   -Hypoglycemic protocol   -Monitor and adjust as needed    MDS with chronic anemia requiring Procrit and intermittent PRBC transfusions  -Hgb / anemia near baseline   -Continue to follow while admitted     Generalized weakness, acute on chronic deconditioning   -Likely 2/2 above   -Continue management  -PT/OT/CM consults appreciated          Rosa Calabrese PA-C    Dragon dictation software was used to dictate this note and thus there may be minor errors in translation/transcription including garbled speech or misspellings. Please contact for clarification if needed.

## 2024-02-15 NOTE — PROGRESS NOTES
Occupational Therapy                 Therapy Communication Note    Patient Name: Tana Hargrove  MRN: [id number]  Today's Date: 2/15/2024     Discipline: Occupational Therapy    Missed Visit Reason: Missed Visit Reason:  (Pt presents today with drop in hemoglobin from 8.3 (2/14) to 6.8 (2/15).  Nursing reports bloody stool this AM.  Physicians monitoring pt status.  Hold OT evaluation this date.)      Comment: OT will monitor pt status and medical POC and complete evaluation as indicated.

## 2024-02-16 ENCOUNTER — APPOINTMENT (OUTPATIENT)
Dept: HOME HEALTH SERVICES | Facility: HOME HEALTH | Age: 79
End: 2024-02-16
Payer: MEDICARE

## 2024-02-16 LAB
ANION GAP SERPL CALC-SCNC: 12 MMOL/L (ref 10–20)
BUN SERPL-MCNC: 56 MG/DL (ref 6–23)
CALCIUM SERPL-MCNC: 8.9 MG/DL (ref 8.6–10.3)
CHLORIDE SERPL-SCNC: 109 MMOL/L (ref 98–107)
CO2 SERPL-SCNC: 21 MMOL/L (ref 21–32)
CREAT SERPL-MCNC: 3.37 MG/DL (ref 0.5–1.05)
EGFRCR SERPLBLD CKD-EPI 2021: 13 ML/MIN/1.73M*2
ERYTHROCYTE [DISTWIDTH] IN BLOOD BY AUTOMATED COUNT: 25.1 % (ref 11.5–14.5)
GLUCOSE BLD MANUAL STRIP-MCNC: 114 MG/DL (ref 74–99)
GLUCOSE BLD MANUAL STRIP-MCNC: 176 MG/DL (ref 74–99)
GLUCOSE BLD MANUAL STRIP-MCNC: 179 MG/DL (ref 74–99)
GLUCOSE BLD MANUAL STRIP-MCNC: 97 MG/DL (ref 74–99)
GLUCOSE SERPL-MCNC: 112 MG/DL (ref 74–99)
HCT VFR BLD AUTO: 23.2 % (ref 36–46)
HGB BLD-MCNC: 7.3 G/DL (ref 12–16)
MCH RBC QN AUTO: 30 PG (ref 26–34)
MCHC RBC AUTO-ENTMCNC: 31.5 G/DL (ref 32–36)
MCV RBC AUTO: 96 FL (ref 80–100)
NRBC BLD-RTO: 0.6 /100 WBCS (ref 0–0)
PLATELET # BLD AUTO: 273 X10*3/UL (ref 150–450)
POTASSIUM SERPL-SCNC: 4.1 MMOL/L (ref 3.5–5.3)
RBC # BLD AUTO: 2.43 X10*6/UL (ref 4–5.2)
SODIUM SERPL-SCNC: 138 MMOL/L (ref 136–145)
VANCOMYCIN SERPL-MCNC: 12.3 UG/ML (ref 5–20)
WBC # BLD AUTO: 9.4 X10*3/UL (ref 4.4–11.3)

## 2024-02-16 PROCEDURE — 99233 SBSQ HOSP IP/OBS HIGH 50: CPT | Performed by: INTERNAL MEDICINE

## 2024-02-16 PROCEDURE — 80202 ASSAY OF VANCOMYCIN: CPT

## 2024-02-16 PROCEDURE — 36415 COLL VENOUS BLD VENIPUNCTURE: CPT

## 2024-02-16 PROCEDURE — 80048 BASIC METABOLIC PNL TOTAL CA: CPT | Performed by: INTERNAL MEDICINE

## 2024-02-16 PROCEDURE — 2500000004 HC RX 250 GENERAL PHARMACY W/ HCPCS (ALT 636 FOR OP/ED): Performed by: STUDENT IN AN ORGANIZED HEALTH CARE EDUCATION/TRAINING PROGRAM

## 2024-02-16 PROCEDURE — 82947 ASSAY GLUCOSE BLOOD QUANT: CPT

## 2024-02-16 PROCEDURE — 2500000001 HC RX 250 WO HCPCS SELF ADMINISTERED DRUGS (ALT 637 FOR MEDICARE OP): Performed by: INTERNAL MEDICINE

## 2024-02-16 PROCEDURE — 2500000004 HC RX 250 GENERAL PHARMACY W/ HCPCS (ALT 636 FOR OP/ED): Performed by: INTERNAL MEDICINE

## 2024-02-16 PROCEDURE — 2500000001 HC RX 250 WO HCPCS SELF ADMINISTERED DRUGS (ALT 637 FOR MEDICARE OP): Performed by: STUDENT IN AN ORGANIZED HEALTH CARE EDUCATION/TRAINING PROGRAM

## 2024-02-16 PROCEDURE — 87186 SC STD MICRODIL/AGAR DIL: CPT | Mod: PORLAB | Performed by: PODIATRIST

## 2024-02-16 PROCEDURE — 36415 COLL VENOUS BLD VENIPUNCTURE: CPT | Performed by: INTERNAL MEDICINE

## 2024-02-16 PROCEDURE — 1200000002 HC GENERAL ROOM WITH TELEMETRY DAILY

## 2024-02-16 PROCEDURE — 85027 COMPLETE CBC AUTOMATED: CPT | Performed by: INTERNAL MEDICINE

## 2024-02-16 PROCEDURE — 2500000002 HC RX 250 W HCPCS SELF ADMINISTERED DRUGS (ALT 637 FOR MEDICARE OP, ALT 636 FOR OP/ED): Performed by: STUDENT IN AN ORGANIZED HEALTH CARE EDUCATION/TRAINING PROGRAM

## 2024-02-16 PROCEDURE — 89060 EXAM SYNOVIAL FLUID CRYSTALS: CPT | Mod: PORLAB | Performed by: PODIATRIST

## 2024-02-16 PROCEDURE — 2500000004 HC RX 250 GENERAL PHARMACY W/ HCPCS (ALT 636 FOR OP/ED): Performed by: PHARMACIST

## 2024-02-16 RX ORDER — VANCOMYCIN HYDROCHLORIDE 500 MG/100ML
500 INJECTION, SOLUTION INTRAVENOUS ONCE
Status: COMPLETED | OUTPATIENT
Start: 2024-02-16 | End: 2024-02-16

## 2024-02-16 RX ADMIN — VANCOMYCIN HYDROCHLORIDE 500 MG: 500 INJECTION, SOLUTION INTRAVENOUS at 20:29

## 2024-02-16 RX ADMIN — FUROSEMIDE 60 MG: 40 TABLET ORAL at 12:43

## 2024-02-16 RX ADMIN — METRONIDAZOLE 500 MG: 500 INJECTION, SOLUTION INTRAVENOUS at 20:29

## 2024-02-16 RX ADMIN — CYANOCOBALAMIN TAB 1000 MCG 1000 MCG: 1000 TAB at 09:24

## 2024-02-16 RX ADMIN — PANTOPRAZOLE SODIUM 40 MG: 40 TABLET, DELAYED RELEASE ORAL at 04:05

## 2024-02-16 RX ADMIN — INSULIN LISPRO 3 UNITS: 100 INJECTION, SOLUTION INTRAVENOUS; SUBCUTANEOUS at 12:39

## 2024-02-16 RX ADMIN — CEFEPIME 500 MG: 1 INJECTION, POWDER, FOR SOLUTION INTRAMUSCULAR; INTRAVENOUS at 21:31

## 2024-02-16 RX ADMIN — ASPIRIN 81 MG: 81 TABLET, COATED ORAL at 09:24

## 2024-02-16 RX ADMIN — ACETAMINOPHEN 650 MG: 325 TABLET ORAL at 05:33

## 2024-02-16 RX ADMIN — METRONIDAZOLE 500 MG: 500 INJECTION, SOLUTION INTRAVENOUS at 04:00

## 2024-02-16 RX ADMIN — PRAVASTATIN SODIUM 40 MG: 40 TABLET ORAL at 20:30

## 2024-02-16 RX ADMIN — METOPROLOL TARTRATE 25 MG: 25 TABLET, FILM COATED ORAL at 09:24

## 2024-02-16 RX ADMIN — AMLODIPINE BESYLATE 5 MG: 5 TABLET ORAL at 09:24

## 2024-02-16 RX ADMIN — CEFEPIME 500 MG: 1 INJECTION, POWDER, FOR SOLUTION INTRAMUSCULAR; INTRAVENOUS at 09:24

## 2024-02-16 RX ADMIN — METRONIDAZOLE 500 MG: 500 INJECTION, SOLUTION INTRAVENOUS at 12:39

## 2024-02-16 ASSESSMENT — COGNITIVE AND FUNCTIONAL STATUS - GENERAL
HELP NEEDED FOR BATHING: A LITTLE
STANDING UP FROM CHAIR USING ARMS: A LITTLE
MOBILITY SCORE: 19
WALKING IN HOSPITAL ROOM: A LITTLE
HELP NEEDED FOR BATHING: A LITTLE
WALKING IN HOSPITAL ROOM: A LITTLE
MOVING TO AND FROM BED TO CHAIR: A LITTLE
MOBILITY SCORE: 19
DRESSING REGULAR LOWER BODY CLOTHING: A LITTLE
CLIMB 3 TO 5 STEPS WITH RAILING: A LITTLE
MOVING TO AND FROM BED TO CHAIR: A LITTLE
DAILY ACTIVITIY SCORE: 21
STANDING UP FROM CHAIR USING ARMS: A LITTLE
TURNING FROM BACK TO SIDE WHILE IN FLAT BAD: A LITTLE
CLIMB 3 TO 5 STEPS WITH RAILING: A LITTLE
DAILY ACTIVITIY SCORE: 22
TOILETING: A LITTLE
TURNING FROM BACK TO SIDE WHILE IN FLAT BAD: A LITTLE
DRESSING REGULAR LOWER BODY CLOTHING: A LITTLE

## 2024-02-16 ASSESSMENT — PAIN SCALES - GENERAL
PAINLEVEL_OUTOF10: 0 - NO PAIN
PAINLEVEL_OUTOF10: 0 - NO PAIN
PAINLEVEL_OUTOF10: 3

## 2024-02-16 ASSESSMENT — PAIN DESCRIPTION - LOCATION: LOCATION: HEAD

## 2024-02-16 ASSESSMENT — PAIN - FUNCTIONAL ASSESSMENT
PAIN_FUNCTIONAL_ASSESSMENT: 0-10
PAIN_FUNCTIONAL_ASSESSMENT: 0-10

## 2024-02-16 NOTE — CARE PLAN
The patient's goals for the shift include      The clinical goals for the shift include Patient will remain free from falls during my shift.      Problem: Skin  Goal: Decreased wound size/increased tissue granulation at next dressing change  Outcome: Progressing  Flowsheets (Taken 2/16/2024 1814)  Decreased wound size/increased tissue granulation at next dressing change: Promote sleep for wound healing  Goal: Participates in plan/prevention/treatment measures  Outcome: Progressing  Flowsheets (Taken 2/16/2024 1814)  Participates in plan/prevention/treatment measures: Elevate heels  Goal: Prevent/manage excess moisture  Outcome: Progressing  Flowsheets (Taken 2/16/2024 1814)  Prevent/manage excess moisture: Moisturize dry skin  Goal: Prevent/minimize sheer/friction injuries  Outcome: Progressing  Flowsheets (Taken 2/16/2024 1814)  Prevent/minimize sheer/friction injuries:   Use pull sheet   HOB 30 degrees or less   Turn/reposition every 2 hours/use positioning/transfer devices  Goal: Promote/optimize nutrition  Outcome: Progressing  Flowsheets (Taken 2/16/2024 1814)  Promote/optimize nutrition: Monitor/record intake including meals  Goal: Promote skin healing  Outcome: Progressing  Flowsheets (Taken 2/16/2024 1814)  Promote skin healing: Turn/reposition every 2 hours/use positioning/transfer devices     Problem: Pain - Adult  Goal: Verbalizes/displays adequate comfort level or baseline comfort level  Outcome: Progressing     Problem: Safety - Adult  Goal: Free from fall injury  Outcome: Progressing     Problem: Discharge Planning  Goal: Discharge to home or other facility with appropriate resources  Outcome: Progressing     Problem: Chronic Conditions and Co-morbidities  Goal: Patient's chronic conditions and co-morbidity symptoms are monitored and maintained or improved  Outcome: Progressing     Problem: Diabetes  Goal: Achieve decreasing blood glucose levels by end of shift  Outcome: Progressing  Goal: Increase  stability of blood glucose readings by end of shift  Outcome: Progressing  Goal: Decrease in ketones present in urine by end of shift  Outcome: Progressing  Goal: Maintain electrolyte levels within acceptable range throughout shift  Outcome: Progressing  Goal: Maintain glucose levels >70mg/dl to <250mg/dl throughout shift  Outcome: Progressing  Goal: No changes in neurological exam by end of shift  Outcome: Progressing  Goal: Learn about and adhere to nutrition recommendations by end of shift  Outcome: Progressing  Goal: Vital signs within normal range for age by end of shift  Outcome: Progressing  Goal: Increase self care and/or family involovement by end of shift  Outcome: Progressing  Goal: Receive DSME education by end of shift  Outcome: Progressing     Problem: Heart Failure  Goal: Improved gas exchange this shift  Outcome: Progressing  Goal: Improved urinary output this shift  Outcome: Progressing  Goal: Reduction in peripheral edema within 24 hours  Outcome: Progressing  Goal: Report improvement of dyspnea/breathlessness this shift  Outcome: Progressing  Goal: Weight from fluid excess reduced over 2-3 days, then stabilize  Outcome: Progressing  Goal: Increase self care and/or family involvement in 24 hours  Outcome: Progressing

## 2024-02-16 NOTE — PROGRESS NOTES
Occupational Therapy                 Therapy Communication Note    Patient Name: Tana Hargrove  MRN: [id number]  Today's Date: 2/16/2024     Discipline: Occupational Therapy    Missed Visit Reason: Missed Visit Reason: Patient refused (Patient reports Podiatrist just finished with wound care of foot, and requesting to hold off on therapy. No updated notes from Podiatry. Will re-attempt tomorrow. check Podiatry for any WB restrictions or limitations to activity.)    Missed Time: Attempt    Comment:

## 2024-02-16 NOTE — PROGRESS NOTES
"Vancomycin Dosing by Pharmacy- FOLLOW UP    Tana Hargrove is a 78 y.o. year old female who Pharmacy has been consulted for vancomycin dosing for cellulitis, skin and soft tissue. Based on the patient's indication and renal status this patient is being dosed based on a goal trough/random level of 10-15.     Renal function is currently stable.    Current vancomycin dose: Dose by level    Most recent random level: 12.3 mcg/mL    Visit Vitals  /70   Pulse 67   Temp 36.2 °C (97.2 °F)   Resp 18        Lab Results   Component Value Date    CREATININE 3.37 (H) 02/16/2024    CREATININE 3.33 (H) 02/15/2024    CREATININE 3.31 (H) 02/14/2024    CREATININE 2.65 (H) 02/11/2024        Patient weight is No results found for: \"PTWEIGHT\"    No results found for: \"CULTURE\"     I/O last 3 completed shifts:  In: 1250 (16.5 mL/kg) [P.O.:200; IV Piggyback:1050]  Out: 1450 (19.2 mL/kg) [Urine:1450 (0.5 mL/kg/hr)]  Weight: 75.7 kg   [unfilled]    Lab Results   Component Value Date    PATIENTTEMP 37.0 02/08/2024    PATIENTTEMP 37.0 08/11/2023    PATIENTTEMP 37.0 04/07/2022    PATIENTTEMP 37.0 04/06/2022        Assessment/Plan    Within goal random/trough level  Will continue to monitor and dose by level  Ordered vancomycin 500 mg IV once today 2/16/24  The next level will be obtained on 2/17 at 1900. May be obtained sooner if clinically indicated.   Will continue to monitor renal function daily while on vancomycin and order serum creatinine at least every 48 hours if not already ordered.  Follow for continued vancomycin needs, clinical response, and signs/symptoms of toxicity.       Wan Ortega [facility]           "

## 2024-02-16 NOTE — PROGRESS NOTES
"Vancomycin Dosing by Pharmacy- FOLLOW UP    Tana Hargrove is a 78 y.o. year old female who Pharmacy has been consulted for vancomycin dosing for Vancomycin Indications: Skin & Soft Tissue. Based on the patient's indication and renal status this patient will be dosed based on a goal AUC of 400-600.     Renal function is currently declining.    Current vancomycin dose: Dose by level    Most recent random level: 10.7 mcg/mL    Visit Vitals  /75 (BP Location: Right arm, Patient Position: Lying)   Pulse 76   Temp 36.7 °C (98.1 °F) (Temporal)   Resp 16           Lab Results   Component Value Date    CREATININE 3.33 (H) 02/15/2024    CREATININE 3.31 (H) 02/14/2024    CREATININE 2.65 (H) 02/11/2024    CREATININE 2.54 (H) 02/10/2024       Patient weight is No results found for: \"PTWEIGHT\"    No results found for: \"CULTURE\"    I/O last 3 completed shifts:  In: 1150 (15.2 mL/kg) [IV Piggyback:1150]  Out: 550 (7.3 mL/kg) [Urine:550 (0.2 mL/kg/hr)]  Weight: 75.7 kg     Lab Results   Component Value Date    PATIENTTEMP 37.0 02/08/2024    PATIENTTEMP 37.0 08/11/2023    PATIENTTEMP 37.0 04/07/2022    PATIENTTEMP 37.0 04/06/2022          Assessment/Plan     Serum creatinine 3.33 on 2/15, increased from 2.65 on 2/11. Will continue to monitor and dose by level  Resulting random level of 10.7 on 2/15 is approximately a 24 hour level from first dose of vancomycin 1000 mg.  Plan for vancomycin 750 mg once today, 2/15/24  The next level will be obtained on 2/16 at 1800. May be obtained sooner if clinically indicated.   Will continue to monitor renal function daily while on vancomycin and order serum creatinine at least every 48 hours if not already ordered.  Follow for continued vancomycin needs, clinical response, and signs/symptoms of toxicity.     Mike Austin PharmD, BCPS      "

## 2024-02-16 NOTE — PROGRESS NOTES
"Music Therapy Note    Tana Hargrove was referred by music therapist    Therapy Session  Referral Type: New referral this admission  Visit Type: New visit  Session Start Time: 1101  Session End Time: 1116  Intervention Delivery: In-person  Conflict of Service: None  Number of family members present: 0  Number of staff members present: 0     Pre-assessment  Unable to Assess Reason: Outcomes not assessed  Mood/Affect: Appropriate, Calm  Verbalized Emotional State: Acceptance         Treatment/Interventions  Music Therapy Interventions: Assessment    Post-assessment  Unable to Assess Reason: Did not provide expressive therapy intervention  Total Session Time (min): 15 minutes    Narrative  Assessment Detail: Upon arrival of music therapist pt was found alert, awake, displaying appropriate affect. Pt expressed that she was doing \"ok\" and that she was looking forward to taking a nap.  Plan: MT provided music therapy education with a focus on coping.  Intervention: Pt declined to participate at this time.  Evaluation: NA  Follow-up: Will follow up at a later time if applicable.    Education Documentation  No documentation found.          "

## 2024-02-16 NOTE — PROGRESS NOTES
Tana Hargrove is a 78 y.o. female on day 2 of admission presenting with Sepsis without septic shock (CMS/[facility]).    Subjective   Tana Hargrove is a 78 y.o. female with PMHx s/f HTN, HLD, chronic diastolic heart failure, COPD (no baseline O2), CKD, NIDDM-II, MDS with chronic anemia requiring Procrit and intermittent PRBC transfusions, OA, GERD, who presents with multiple complaints.  Patient reports that her initial concerns this morning were generalized weakness, fatigue, nausea and a very large loose bowel movement.  She states that she had not had a bowel movement urinary however most recent hospitalization from 02/07/2024 till 02/11/2024 for ESTEE on CKD possibly in setting of myelodysplastic syndrome, urinary tract infection, poor p.o. intake of food/water.  She he had one episode of nonbloody nonbilious emesis, and her symptoms continue to worsen so she came in for additional evaluation. She also notes that she has been having worsening pain, redness, swelling associated with a wound on the bottom of her right big toe; which she reports seeing her podiatrist (Dr. Pyle) on a weekly basis.  She states that she has not seen Dr. Pyle in some time due to her most recent hospitalization.  She states that when she awoke this morning she felt very weak, generally unwell and has had very poor p.o. intake of food/water throughout the day.  She then called EMS to be brought in for further evaluation and management.  EMS had found her to be hypoxic and she was placed on 2 L via nasal cannula but otherwise her only other complaint was worsening right foot pain/swelling and redness and a significant amount of drainage.  She states that it was not draining during her most recent hospitalization but ever since returning home she has had a significant amount of yellow/green material expressed down on her sheets and socks.  She denied any additional injury to the area but did admit to a history of diabetes mellitus with  neuropathy.  She denies any recent sick contacts, chemical/environmental exposures, changes in dietary habits or any recent traumatic events/falls other than noted above.  She denies any fevers, chills, night sweats, vision changes, auditory changes, change in taste/smell, loss of bowel/bladder control, loss consciousness, dizziness, vertigo, syncope, seizure-like activity, chest pain, palpitations, coughing, wheezing, congestion, hemoptysis, hematemesis, abdominal pain, dysuria, hematuria, dyschezia, hematochezia any lateralizing motor/sensory deficits other than noted above.     ED Course (Summary):   Vitals on presentation: T98.1, /56, , RR 14, SpO2 92% RA (dropped to less than 89% on room air per discussion with the ED and required 2 L nasal cannula)  Labs: CBC with differential notable for WBC 16.6 with neutrophil predominance, Hgb 8.3, platelets 335.  Chemistries with glucose 205, sodium 136, potassium 4.3, BUN 54, serum creatinine 3.31, mag 1.77.  Lactate 1.5.  CRP 7.93/ESR 63.  High-sensitivity troponin 15.  .  Viral panel negative  Imaging: CXR with pulmonary edema. XR right foot with Severe osteoarthritis, soft tissue swelling (findings may reflect gout - MRI for additional correlation)  Interventions: 500 cc normal saline bolus, vancomycin/meropenem, 40 mg IV push Lasix    2/16: She is frustrated about her situation.        Objective     A Comprehensive greater than 10 system review of systems was carried out.  Pertinent positives and negatives are noted above.  Otherwise negative for contributory information.       Constitutional:       General: She is not in acute distress.     Appearance: Normal appearance. She is not ill-appearing or diaphoretic.   HENT:      Head: Normocephalic and atraumatic.      Mouth/Throat:      Mouth: Mucous membranes are moist.      Pharynx: Oropharynx is clear.   Eyes:      Extraocular Movements: Extraocular movements intact.      Conjunctiva/sclera:  Conjunctivae normal.      Pupils: Pupils are equal, round, and reactive to light.   Neck:      Vascular: No carotid bruit.   Cardiovascular:      Rate and Rhythm: Normal rate and regular rhythm.      Pulses: Normal pulses.      Heart sounds: Murmur heard.      Comments: Faint ejection murmur heard best at the left lower sternal border  Pulmonary:      Comments: Diminished breath sounds are all lung fields   Abdominal:      General: Abdomen is flat. Bowel sounds are normal. There is no distension.      Palpations: Abdomen is soft. There is no mass.      Tenderness: There is no abdominal tenderness. There is no guarding or rebound.   Musculoskeletal:      Cervical back: Normal range of motion and neck supple. No rigidity or tenderness.      Right lower leg: Edema present.      Left lower leg: Edema present.      Comments: 2+ pitting edema of bilateral lower extremities to just below the knee, upper extremity strength/range of motion is likely a 4 out of 5 with the lower extremities being ever so slightly less otherwise symmetric   Skin:     General: Skin is warm and dry.      Capillary Refill: Capillary refill takes less than 2 seconds.      Findings: Erythema and lesion present. No bruising or rash.      Comments: Patient does have an open wound at the base of the right great toe on the plantar aspect with minimal drainage that appears serosanguineous at this time but she does have significant erythema and swelling of the right great toe into the midfoot with slight tenderness to palpation in the midfoot but minimal tenderness to palpation around the wound and she states that she is barely able to tell me that she feels me palpating in that area.  She has a similar wound on the left foot but this is dry and not erythematous or swollen and is in there appears to be an eschar in the area as well.,  Intact pulses in the DP/PT of bilateral lower extremities   Neurological:      Mental Status: She is alert and oriented to  person, place, and time.      Cranial Nerves: No cranial nerve deficit.      Sensory: Sensory deficit present.      Motor: No weakness.      Coordination: Coordination normal.      Gait: Gait normal.      Comments: AOx4, finger-to-nose/heel-to-shin grossly intact bilaterally, range of motion/strength appears grossly intact but diminished in the upper extremities at about 4 out of 5 and slightly worse on the lower extremities.  Patient was able to sit up to the side of bed with minimal assistance and stand without assistance but was somewhat unsteady at which time she was placed back in bed pending further evaluation and management from an infectious standpoint she does have decreased sensation of bilateral lower extremities in the feet and she was barely able to tell me that I was touching her and did require a significant amount of pressure to elicit painful stimuli.   Psychiatric:         Mood and Affect: Mood normal.         Behavior: Behavior normal.         Thought Content: Thought content normal.         Judgment: Judgment normal.        Last Recorded Vitals  Blood pressure 120/72, pulse 73, temperature 36.8 °C (98.2 °F), resp. rate 18, height 1.524 m (5'), weight 75.7 kg (166 lb 14.2 oz), SpO2 94 %.  Intake/Output last 3 Shifts:  I/O last 3 completed shifts:  In: 1250 (16.5 mL/kg) [P.O.:200; IV Piggyback:1050]  Out: 1450 (19.2 mL/kg) [Urine:1450 (0.5 mL/kg/hr)]  Weight: 75.7 kg     Relevant Results                             Assessment/Plan   Sepsis without shock 2/2 diabetic foot wound with overlying cellulitis and OM of the R great toe and R 3rd toe, POA  -SIRS criteria (2/4): WBCs, HR   -Source: DM foot wound/skin and soft tissue infection/OM  -End-organ dysfunction: ESTEE/CKD (but this has been variable)  -Lactate 1.5  -BP is normotensive  -Blood cultures x2. UA pending. Wound culture. Follow fever curve, WBCs.  -Continue antibiotic coverage with Vanc/Cefepime/Flagyl as per sepsis guidelines in Zosyn  intolerant patient   -Wound care consult appreciated   -Podiatry and ID consults     Acute on chronic diastolic heart failure   -Evidenced by presenting sxs: LE edema  -CXR with: pulmonary edema   -Echocardiogram update ordered, last completed 2022  -BNP: 100; mostly peripherally edematous  -Nephrology now managing diuresis and are using a daily PRN approach   -Monitor renal function closely with diuresis   -Monitor electrolytes and replenish generously as needed   -Strict I&O’s   -2g salt restriction, 2L fluid restriction   -Monitor fluid status closely   -Tele     Acute hypoxic respiratory failure   -weaned to RA     CKD IV  -Baseline sCr has been variable since the beginning of the year, ~2.6-3.4  -On admission: 3.31 with BUN 54  -Follow UOP / Is & Os  -Neph consulting     HTN, HLD  -Continue home therapies      COPD without acute exacerbation   -Continue home therapies      NIDDM-II with hyperglycemia   -SSI ACHS  -Accucheks   -Hypoglycemic protocol   -Monitor and adjust as needed     MDS with chronic anemia requiring Procrit and intermittent PRBC transfusions  -Hgb / anemia near baseline   -Continue to follow while admitted (on 2/15 Hbg did dip to 6.8, slightly below her average, this appears to be dilutional as compared to the 2/14 CBC. She was above 7 g/dL on 2/16)      Generalized weakness, acute on chronic deconditioning   -Likely 2/2 above   -Continue management  -PT/OT/CM consults appreciated, patient adamantly refuses SNF.       Pollo Herbert MD PhD

## 2024-02-16 NOTE — CONSULTS
Consults    Primary MD: Carlos Higgins MD    Reason For Consult  Antibiotic management     History Of Present Illness  Tana Hargrove is a 78 y.o. female with PMHx s/f HTN, HLD, chronic diastolic heart failure, COPD (no baseline O2), CKD, NIDDM-II, MDS with chronic anemia requiring Procrit and intermittent PRBC transfusions, OA, GERD, who presents with multiple complaints.  Patient reports that her initial concerns this morning were generalized weakness, fatigue, nausea and a very large loose bowel movement.  She states that she had not had a bowel movement urinary however most recent hospitalization from 02/07/2024 till 02/11/2024 for ESTEE on CKD possibly in setting of myelodysplastic syndrome, urinary tract infection, poor p.o. intake of food/water.  She he had one episode of nonbloody nonbilious emesis, and her symptoms continue to worsen so she came in for additional evaluation. She also notes that she has been having worsening pain, redness, swelling associated with a wound on the bottom of her right big toe; which she reports seeing her podiatrist (Dr. Pyle) on a weekly basis.  She states that she has not seen Dr. Pyle in some time due to her most recent hospitalization.  She states that when she awoke this morning she felt very weak, generally unwell and has had very poor p.o. intake of food/water throughout the day.  She then called EMS to be brought in for further evaluation and management.  EMS had found her to be hypoxic and she was placed on 2 L via nasal cannula but otherwise her only other complaint was worsening right foot pain/swelling and redness and a significant amount of drainage.  She states that it was not draining during her most recent hospitalization but ever since returning home she has had a significant amount of yellow/green material expressed down on her sheets and socks.  She denied any additional injury to the area but did admit to a history of diabetes mellitus with neuropathy.   She denies any recent sick contacts, chemical/environmental exposures, changes in dietary habits or any recent traumatic events/falls other than noted above.  She denies any fevers, chills, night sweats, vision changes, auditory changes, change in taste/smell, loss of bowel/bladder control, loss consciousness, dizziness, vertigo, syncope, seizure-like activity, chest pain, palpitations, coughing, wheezing, congestion, hemoptysis, hematemesis, abdominal pain, dysuria, hematuria, dyschezia, hematochezia any lateralizing motor/sensory deficits other than noted above.     ED Course (Summary):   Vitals on presentation: T98.1, /56, , RR 14, SpO2 92% RA (dropped to less than 89% on room air per discussion with the ED and required 2 L nasal cannula)  Labs: CBC with differential notable for WBC 16.6 with neutrophil predominance, Hgb 8.3, platelets 335.  Chemistries with glucose 205, sodium 136, potassium 4.3, BUN 54, serum creatinine 3.31, mag 1.77.  Lactate 1.5.  CRP 7.93/ESR 63.  High-sensitivity troponin 15.  .  Viral panel negative  Imaging: CXR with pulmonary edema. XR right foot with Severe osteoarthritis, soft tissue swelling (findings may reflect gout - MRI for additional correlation)  Interventions: 500 cc normal saline bolus, vancomycin/meropenem, 40 mg IV push Lasix     Past Medical History  She has a past medical history of Abnormal levels of other serum enzymes, Acute kidney failure (CMS/HCC), Anemia, CKD (chronic kidney disease), COPD (chronic obstructive pulmonary disease) (CMS/HCC), Disease of blood and blood-forming organs, unspecified, HLD (hyperlipidemia), MDS (myelodysplastic syndrome) (CMS/HCC), Personal history of other diseases of the musculoskeletal system and connective tissue, Personal history of other specified conditions, Personal history of other specified conditions, and Type 2 diabetes mellitus (CMS/HCC).    Surgical History  She has a past surgical history that includes  Other surgical history (12/13/2019); Other surgical history (12/13/2019); Other surgical history (Bilateral, 12/13/2019); Other surgical history (Left, 12/13/2019); Other surgical history (12/13/2019); Other surgical history (12/13/2019); Other surgical history (Right, 12/13/2019); Other surgical history (04/16/2021); MR angio head wo IV contrast (11/20/2020); MR angio neck wo IV contrast (11/20/2020); Breast biopsy (Left); Hysterectomy; Breast lumpectomy; Appendectomy; Colonoscopy; and Oophorectomy.     Social History     Occupational History    Not on file   Tobacco Use    Smoking status: Never    Smokeless tobacco: Never   Vaping Use    Vaping Use: Never used   Substance and Sexual Activity    Alcohol use: Not Currently    Drug use: Never    Sexual activity: Not on file     Travel History   Travel since 01/16/24    No documented travel since 01/16/24         Family History  No family history on file.  Allergies  Codeine, Hydrocodone, Hydrocodone-acetaminophen, Meperidine (pf), Tramadol, Piperacillin-tazobactam, Adhesive tape-silicones, and Meperidine     Immunization History   Administered Date(s) Administered    Flu vaccine, quadrivalent, high-dose, preservative free, age 65y+ (FLUZONE) 09/26/2020    Hepatitis B vaccine, pediatric/adolescent (RECOMBIVAX, ENGERIX) 06/06/2007, 10/30/2009    Influenza, High Dose Seasonal, Preservative Free 10/23/2018, 10/12/2023    Influenza, Unspecified 10/03/2017    Moderna SARS-CoV-2 Vaccination 01/16/2021, 02/17/2021, 05/02/2022    Pneumococcal conjugate vaccine, 13-valent (PREVNAR 13) 09/26/2020    Pneumococcal polysaccharide vaccine, 23-valent, age 2 years and older (PNEUMOVAX 23) 11/09/2000, 07/31/2015, 11/09/2020    Td (adult), unspecified 07/29/1998    Tdap vaccine, age 7 year and older (BOOSTRIX, ADACEL) 11/18/2019, 05/17/2022    Zoster vaccine, recombinant, adult (SHINGRIX) 09/26/2020     Medications  Home medications:  Facility-Administered Medications Prior to  Admission   Medication Dose Route Frequency Provider Last Rate Last Admin    sodium chloride 0.9% flush 10 mL  10 mL intravenous PRN Rosanne M Casal, APRN-CNP, DNP         Medications Prior to Admission   Medication Sig Dispense Refill Last Dose    acetaminophen (Tylenol) 500 mg tablet Take by mouth every 8 hours if needed.       allopurinol (Zyloprim) 100 mg tablet Take 1 tablet (100 mg) by mouth once daily. 90 tablet 1     amLODIPine (Norvasc) 5 mg tablet Take 1 tablet (5 mg) by mouth once daily.       aspirin 81 mg EC tablet Take 1 tablet (81 mg) by mouth once daily.       cholecalciferol (Vitamin D-3) 50 MCG (2000 UT) tablet Take 1 tablet (2,000 Units) by mouth once daily.       cyanocobalamin (Vitamin B-12) 1,000 mcg tablet Take 1 tablet (1,000 mcg) by mouth once daily.       epoetin alejandra (Procrit) 10,000 unit/mL injection Pt gets every Monday.       fluticasone (Flonase) 50 mcg/actuation nasal spray Administer 1 spray into each nostril once daily.       furosemide (Lasix) 20 mg tablet TAKE 1 TABLET BY MOUTH ONCE DAILY 7 tablet 0     meclizine (Antivert) 12.5 mg tablet Take 1 tablet (12.5 mg) by mouth 2 times a day as needed for dizziness. 15 tablet 0     metoprolol tartrate (Lopressor) 25 mg tablet Take by mouth. PT IS UNSURE IF TAKING NO FILLS WITH PHARMACY       pioglitazone (Actos) 15 mg tablet Take 1 tablet (15 mg) by mouth once daily. 90 tablet 0     pravastatin (Pravachol) 40 mg tablet Take 1 tablet (40 mg) by mouth once daily at bedtime. 90 tablet 0     pregabalin (Lyrica) 100 mg capsule TAKE ONE CAPSULE BY MOUTH TWICE DAILY @9AM & 5PM 60 capsule 0     SITagliptin phosphate (Januvia) 100 mg tablet Take 1 tablet (100 mg) by mouth once daily. 90 tablet 0      Current medications:  Scheduled medications  amLODIPine, 5 mg, oral, Daily  aspirin, 81 mg, oral, Daily  cefepime, 500 mg, intravenous, q12h  cyanocobalamin, 1,000 mcg, oral, Daily  fluticasone, 1 spray, Each Nostril, Daily  [Held by provider]  furosemide, 40 mg, intravenous, BID  insulin lispro, 0-15 Units, subcutaneous, Before meals & nightly  melatonin, 3 mg, oral, Daily  metoprolol tartrate, 25 mg, oral, Daily  metroNIDAZOLE, 500 mg, intravenous, q8h  pantoprazole, 40 mg, oral, Daily before breakfast   Or  pantoprazole, 40 mg, intravenous, Daily before breakfast  polyethylene glycol, 17 g, oral, Daily  pravastatin, 40 mg, oral, Nightly      Continuous medications     PRN medications  PRN medications: acetaminophen, bisacodyl, dextrose 10 % in water (D10W), dextrose, glucagon, guaiFENesin, meclizine, ondansetron **OR** ondansetron, oxygen, vancomycin    Review of Systems   As per HPI    Objective  Range of Vitals (last 24 hours)  Heart Rate:  [70-78]   Temp:  [36.7 °C (98 °F)-37.3 °C (99.1 °F)]   Resp:  [16-18]   BP: (120-147)/(71-75)   SpO2:  [92 %-95 %]   Daily Weight  02/14/24 : 75.7 kg (166 lb 14.2 oz)    Body mass index is 32.59 kg/m².     Physical Exam  General: AAO*3  Skin: no rashes  Neck: supple  CVS: S1S2  Chest:CTAB  GI: soft, non tender  : no CVAT  Psych: alert,oriented  CNS: no FND       Relevant Results  Outside Hospital Results  No  Labs  Results from last 72 hours   Lab Units 02/16/24  0401 02/15/24  0555 02/14/24  1758 02/13/24  1227   WBC AUTO x10*3/uL 9.4 7.7 16.6* 8.7   HEMOGLOBIN g/dL 7.3* 6.8* 8.3* 8.4*   HEMATOCRIT % 23.2* 22.0* 27.1* 27.5*   PLATELETS AUTO x10*3/uL 273 259 335 371   NEUTROS PCT AUTO %  --   --  85.5 62.8   LYMPHS PCT AUTO %  --   --  6.1 22.5   MONOS PCT AUTO %  --   --  6.4 8.7   EOS PCT AUTO %  --   --  0.6 4.4     Results from last 72 hours   Lab Units 02/16/24  0401 02/15/24  0555 02/14/24  1758   SODIUM mmol/L 138 139 136   POTASSIUM mmol/L 4.1 4.1 4.3   CHLORIDE mmol/L 109* 110* 106   CO2 mmol/L 21 21 19*   BUN mg/dL 56* 55* 54*   CREATININE mg/dL 3.37* 3.33* 3.31*   GLUCOSE mg/dL 112* 120* 205*   CALCIUM mg/dL 8.9 8.5* 9.4   ANION GAP mmol/L 12 12 15   EGFR mL/min/1.73m*2 13* 14* 14*   PHOSPHORUS mg/dL   "--   --  4.3     Results from last 72 hours   Lab Units 02/14/24  1758   ALK PHOS U/L 66   BILIRUBIN TOTAL mg/dL 0.6   PROTEIN TOTAL g/dL 6.8   ALT U/L 11   AST U/L 19   ALBUMIN g/dL 4.0     Estimated Creatinine Clearance: 12.3 mL/min (A) (by C-G formula based on SCr of 3.37 mg/dL (H)).  C-Reactive Protein   Date Value Ref Range Status   02/14/2024 7.93 (H) <1.00 mg/dL Final     CRP   Date Value Ref Range Status   07/31/2021 1.87 (A) mg/dL Final     Comment:     REF VALUE  < 1.00     11/20/2020 0.38 mg/dL Final     Comment:     REF VALUE  < 1.00       Sedimentation Rate   Date Value Ref Range Status   02/14/2024 63 (H) 0 - 30 mm/h Final   07/31/2021 22 0 - 30 mm/h Final     No results found for: \"HIV1X2\", \"HIVCONF\", \"AGNRVS7DY\"  No results found for: \"HEPCABINIT\", \"HEPCAB\", \"HCVPCRQUANT\"  Microbiology  Susceptibility data from last 90 days.  Collected Specimen Info Organism Amoxicillin/Clavulanate Ampicillin Ampicillin/Sulbactam Cefazolin Cefazolin (uncomplicated UTIs only) Ciprofloxacin Gentamicin Nitrofurantoin Piperacillin/Tazobactam Trimethoprim/Sulfamethoxazole   02/08/24 Urine from Clean Catch/Voided Klebsiella pneumoniae/variicola S R S S S S S S S S   12/13/23 Urine from Clean Catch/Voided Klebsiella pneumoniae/variicola S R S S S S S S S R        Assessment/Plan     Sepsis  Right foot plantar ulcer   Myelodysplastic syndrome  HTN  HLD  CHF  COPD  CKD  DM  MDS  GERD    Suggest:  Right foot wound on plantar surface, wound is chronic, yellow discharge at base.  MRI reviewed- images reviewed by podiatry- more consistent with reactive osteitis than OM. Follow podiatry consult: May aspirate MTP joint to r/o septic arthritis   Follow blood cx  Follow tissue cx  C/w vancomycin   C/w cefepime   C/w flagyl   Trend wbc and temps    Vance Obregon MD  "

## 2024-02-16 NOTE — CONSULTS
Reason For Consult  Ulcer on right foot, possible osteomyeltiis    History Of Present Illness  Tana Hargrove is a 78 y.o. female presenting with a chronic ulcer on the plantar right foot. She has been dealing with the wound for several months. She keeps the wound clean and bandaged at all times. She wears supportive shoes with diabetic inserts, but still has one area where pressure and friction keep the wound open. I have been seeing her routinely for wound care, including last week. The wound did not show signs of infection at her recent appointment.  Patient does have history of chronic gout in the 1st toe joint, and recently had an acute flare. She also had an I&D several years ago due to a severe case of gout in that area.      Past Medical History  She has a past medical history of Abnormal levels of other serum enzymes, Acute kidney failure (CMS/HCC), Anemia, CKD (chronic kidney disease), COPD (chronic obstructive pulmonary disease) (CMS/HCC), Disease of blood and blood-forming organs, unspecified, HLD (hyperlipidemia), MDS (myelodysplastic syndrome) (CMS/HCC), Personal history of other diseases of the musculoskeletal system and connective tissue, Personal history of other specified conditions, Personal history of other specified conditions, and Type 2 diabetes mellitus (CMS/HCC).    Surgical History  She has a past surgical history that includes Other surgical history (12/13/2019); Other surgical history (12/13/2019); Other surgical history (Bilateral, 12/13/2019); Other surgical history (Left, 12/13/2019); Other surgical history (12/13/2019); Other surgical history (12/13/2019); Other surgical history (Right, 12/13/2019); Other surgical history (04/16/2021); MR angio head wo IV contrast (11/20/2020); MR angio neck wo IV contrast (11/20/2020); Breast biopsy (Left); Hysterectomy; Breast lumpectomy; Appendectomy; Colonoscopy; and Oophorectomy.     Social History  She reports that she has never smoked. She has  never used smokeless tobacco. She reports that she does not currently use alcohol. She reports that she does not use drugs.    Family History  No family history on file.     Allergies  Codeine, Hydrocodone, Hydrocodone-acetaminophen, Meperidine (pf), Tramadol, Piperacillin-tazobactam, Adhesive tape-silicones, and Meperidine    Review of Systems  ***     Physical Exam  ***     Last Recorded Vitals  Blood pressure 146/73, pulse 70, temperature 36.8 °C (98.3 °F), temperature source Temporal, resp. rate 16, height 1.524 m (5'), weight 75.7 kg (166 lb 14.2 oz), SpO2 94 %.    Relevant Results  {If you would like to pull in Imaging results, type .imgrslt :99}  Transthoracic Echo (TTE) Complete    Result Date: 2/15/2024              [place] [id number]      Phone [phone] Fax [phone] TRANSTHORACIC ECHOCARDIOGRAM REPORT  Patient Name:      YVONNE Motta Physician:    [id number] Humphrey Callaway DO Study Date:        2/15/2024             Ordering Provider:    [id number] JASMIN ROACH MRN/PID:           [id number]              Fellow: Accession#:        [id number]          Nurse: Date of Birth/Age: 1945 / 78 years  Sonographer:          Suad Arora RDCS Gender:            F                     Additional Staff: Height:            152.40 cm             Admit Date:           2/14/2024 Weight:            75.30 kg              Admission Status:     Inpatient -                                                                Routine BSA / BMI:         1.72 m2 / 32.42 kg/m2 Department Location:  [facility] Echo                                                                Lab Blood Pressure: 152 /67 mmHg Study Type:    TRANSTHORACIC ECHO (TTE) COMPLETE Diagnosis/ICD: Acute combined systolic (congestive) and diastolic (congestive)                 heart failure (CHF)-I50.41; Dyspnea, unspecified-R06.00 Indication:    Congestive Heart Failure, Dyspnea CPT Codes:     Echo Complete w Full Doppler-[id number] Patient History: Diabetes:          Yes Pertinent History: Dyspnea, CHF, COPD, HTN, Hyperlipidemia, CAD and A-Fib. Study Detail: The following Echo studies were performed: 2D, M-Mode, Doppler and               color flow.  PHYSICIAN INTERPRETATION: Left Ventricle: Left ventricular systolic function is normal, with an estimated ejection fraction of 60%. There are no regional wall motion abnormalities. The left ventricular cavity size is normal. The left ventricular septal wall thickness is moderately increased. There is mildly increased left ventricular posterior wall thickness. Spectral Doppler shows an impaired relaxation pattern of left ventricular diastolic filling. Left Atrium: The left atrium is moderately dilated. Right Ventricle: The right ventricle is normal in size. There is normal right ventricular global systolic function. Right Atrium: The right atrium is mildly dilated. Aortic Valve: The aortic valve is trileaflet. There is no evidence of aortic valve stenosis. There is no evidence of aortic valve regurgitation. Mitral Valve: The mitral valve is mildly thickened. There is mild mitral annular calcification. There is trace mitral valve regurgitation. Tricuspid Valve: The tricuspid valve is structurally normal. There is mild to moderate tricuspid regurgitation. The Doppler estimated RVSP is mildly elevated at 38.8 mmHg. Pulmonic Valve: The pulmonic valve is structurally normal. There is no indication of pulmonic valve regurgitation. Pericardium: There is no pericardial effusion noted. Aorta: The aortic root is normal. Systemic Veins: The inferior vena cava appears to be of normal size.  CONCLUSIONS:  1. Left ventricular systolic function is normal with a 60% estimated ejection fraction.  2. Moderately increased left ventricular septal thickness.  3.  Spectral Doppler shows an impaired relaxation pattern of left ventricular diastolic filling.  4. The left atrium is moderately dilated.  5. Mild to moderate tricuspid regurgitation.  6. Mildly elevated RVSP.  7. Aortic valve stenosis is not present. QUANTITATIVE DATA SUMMARY: 2D MEASUREMENTS:                           Normal Ranges: Ao Root d:     3.20 cm    (2.0-3.7cm) LAs:           4.30 cm    (2.7-4.0cm) IVSd:          1.60 cm    (0.6-1.1cm) LVPWd:         1.20 cm    (0.6-1.1cm) LVIDd:         4.30 cm    (3.9-5.9cm) LVIDs:         3.30 cm LV Mass Index: 134.7 g/m2 LV % FS        23.3 % LA VOLUME:                               Normal Ranges: LA Vol A4C:        70.8 ml    (22+/-6mL/m2) LA Vol A2C:        76.2 ml LA Vol BP:         77.9 ml LA Vol Index A4C:  41.1ml/m2 LA Vol Index A2C:  44.2 ml/m2 LA Vol Index BP:   45.2 ml/m2 LA Area A4C:       20.0 cm2 LA Area A2C:       22.0 cm2 LA Major Axis A4C: 4.8 cm LA Major Axis A2C: 5.4 cm LA Volume Index:   39.0 ml/m2 RA VOLUME BY A/L METHOD:                       Normal Ranges: RA Area A4C: 13.2 cm2 M-MODE MEASUREMENTS:                  Normal Ranges: Ao Root: 2.90 cm (2.0-3.7cm) AoV Exc: 1.90 cm (1.5-2.5cm) LAs:     4.80 cm (2.7-4.0cm) AORTA MEASUREMENTS:                      Normal Ranges: AoV Exc:     1.90 cm (1.5-2.5cm) Ao Sinus, d: 3.20 cm (2.1-3.5cm) Asc Ao, d:   3.30 cm (2.1-3.4cm) LV SYSTOLIC FUNCTION BY 2D PLANIMETRY (MOD):                     Normal Ranges: EF-A4C View: 69.1 % (>=55%) EF-A2C View: 63.5 % EF-Biplane:  66.8 % LV DIASTOLIC FUNCTION:                           Normal Ranges: MV Peak E:    0.81 m/s    (0.7-1.2 m/s) MV Peak A:    0.96 m/s    (0.42-0.7 m/s) E/A Ratio:    0.84        (1.0-2.2) MV e'         0.06 m/s    (>8.0) MV lateral e' 0.07 m/s MV medial e'  0.05 m/s MV A Dur:     120.00 msec E/e' Ratio:   13.48       (<8.0) LV IVRT:      95 msec     (<100ms) MITRAL VALVE:                 Normal Ranges: MV DT: 194 msec (150-240msec) AORTIC VALVE:                          Normal Ranges: LVOT Max Mario:  1.39 m/s (<=1.1m/s) LVOT VTI:      28.20 cm LVOT Diameter: 1.80 cm  (1.8-2.4cm)  RIGHT VENTRICLE: RV Basal 3.10 cm RV Mid   2.40 cm RV Major 6.1 cm TAPSE:   22.0 mm RV s'    0.13 m/s TRICUSPID VALVE/RVSP:                             Normal Ranges: Peak TR Velocity: 2.99 m/s RV Syst Pressure: 38.8 mmHg (< 30mmHg) TV E Vmax:        0.41 m/s  (0.3-0.7m/s) TV A Vmax:        0.70 m/s IVC Diam:         1.80 cm PULMONIC VALVE:                      Normal Ranges: PV Max Mario: 0.9 m/s  (0.6-0.9m/s) PV Max PG:  3.5 mmHg  [id number] Humphrey Callaway DO Electronically signed on 2/15/2024 at 2:38:43 PM  ** Final (Updated) **     MR foot right wo IV contrast    Result Date: 2/15/2024  Interpreted By:  Dony Mo, STUDY: MRI of the  right forefoot  without IV contrast;  2/15/2024 11:09 am   INDICATION: Signs/Symptoms:DM foot wound.   COMPARISON: Radiographs 02/14/2024   ACCESSION NUMBER(S): [id number]   ORDERING CLINICIAN: JASMIN ROACH   TECHNIQUE: MR imaging of the  right forefoot was obtained  without IV contrast.   FINDINGS: Motion limited examination.   TENDONS: Small amount of fluid tracking along the tendon sheath of the flexor hallucis longus tendon. The flexor and extensor tendons of the forefoot are otherwise intact.   LIGAMENTS: The major ligamentous structures of the forefoot are grossly intact. The Lisfranc ligament is grossly intact.   JOINTS/OSSEOUS STRUCTURES: Increased T2 signal and mildly decreased T1 signal involving the distal phalanx and to a lesser extent middle phalanx of the 3rd toe. Moderately increased T2 signal involving the great toe proximal and distal phalanges with relatively preserved normal T1 marrow signal.   There is moderate great toe MTP and small great toe IP joint fluid with moderate periarticular edema. No significant joint fluid elsewhere. There is no evidence of acute fracture or dislocation.   SOFT TISSUES: Subcentimeter soft tissue ulcer  plantar to the 1st MTP joint, abutting the flexor hallucis longus tendon. No fluid collection. There is moderate subcutaneous edema surrounding the 1st MTP joint without a fluid collection. Additional subcentimeter soft tissue wound dorsal to the distal 3rd toe without a fluid collection (series 301, image 9). Diffuse subcutaneous edema in the dorsal lateral forefoot. There is marked fatty atrophy of the intrinsic foot musculature with diffuse intramuscular edema. No suspicious soft tissue mass.       Suspect high probability acute osteomyelitis of the great toe, great toe MTP septic joint and flexor hallucis longus tenosynovitis with adjacent plantar soft tissue ulcer.   High probability acute osteomyelitis of the 3rd toe distal and probably middle phalanges with adjacent dorsal soft tissue ulcer.   No soft tissue abscess.   MACRO Dony Mo discussed the significance and urgency of this critical finding by Epic secure chat with Dr. Pollo Herbert in absence of JASMIN ROACH on 2/15/2024 at 11:37 am.  (**-RCF-**) Findings: See findings.   Signed by: Dony Mo 2/15/2024 11:38 AM Dictation workstation:   [id number]    ECG 12 lead    Result Date: 2/15/2024  Sinus rhythm Low voltage, precordial leads    XR chest 2 views    Result Date: 2/14/2024  Interpreted By:  Alon Ga, STUDY: XR CHEST 2 VIEWS;  2/14/2024 6:49 pm   INDICATION: Signs/Symptoms:SOB.   COMPARISON: 2 8 24   ACCESSION NUMBER(S): [id number]   ORDERING CLINICIAN: JAYSHREE ROSENTHAL   FINDINGS: Elevated right hemidiaphragm. Heart size is enlarged. Features suggesting edema. Underlying pneumonia not categorically excluded. Senescent changes.       1.  Findings suspicious for pulmonary edema.       MACRO: None   Signed by: Alon Ga 2/14/2024 7:20 PM Dictation workstation:   [id number]    XR foot right 3+ views    Result Date: 2/14/2024  Interpreted By:  Alon Ga, STUDY: XR FOOT RIGHT 3+ VIEWS; ;  2/14/2024 6:49 pm    INDICATION: Signs/Symptoms:wound.   COMPARISON: None.   ACCESSION NUMBER(S): [id number]   ORDERING CLINICIAN: JAYSHREE ROSENTHAL   FINDINGS: Severe right great toe osteoarthritis. Demineralization. Vascular calcification. Soft tissue swelling is seen.. Apparent nodular density seen in the soft tissues. This could represent sequela of gout. MRI could confirm.       Severe osteoarthritis. Soft tissue swelling. Findings may reflect gout. If persistent concern, consider MRI     MACRO: None   Signed by: Alon Ga 2/14/2024 7:19 PM Dictation workstation:   [id number]    Scheduled medications  amLODIPine, 5 mg, oral, Daily  aspirin, 81 mg, oral, Daily  cefepime, 500 mg, intravenous, q12h  cyanocobalamin, 1,000 mcg, oral, Daily  fluticasone, 1 spray, Each Nostril, Daily  [Held by provider] furosemide, 40 mg, intravenous, BID  insulin lispro, 0-15 Units, subcutaneous, Before meals & nightly  melatonin, 3 mg, oral, Daily  metoprolol tartrate, 25 mg, oral, Daily  metroNIDAZOLE, 500 mg, intravenous, q8h  pantoprazole, 40 mg, oral, Daily before breakfast   Or  pantoprazole, 40 mg, intravenous, Daily before breakfast  polyethylene glycol, 17 g, oral, Daily  pravastatin, 40 mg, oral, Nightly      Continuous medications     PRN medications  PRN medications: acetaminophen, bisacodyl, dextrose 10 % in water (D10W), dextrose, glucagon, guaiFENesin, meclizine, ondansetron **OR** ondansetron, oxygen, vancomycin  Results for orders placed or performed during the hospital encounter of 02/14/24 (from the past 24 hour(s))   POCT GLUCOSE   Result Value Ref Range    POCT Glucose 156 (H) 74 - 99 mg/dL   Transthoracic Echo (TTE) Complete   Result Value Ref Range    LVOT diam 1.80 cm    LV biplane EF 67 %    MV E/A ratio 0.84     MV avg E/e' ratio 13.48     Tricuspid annular plane systolic excursion 2.2 cm    LA vol index A/L 45.2 ml/m2    RV free wall pk S' 13.00 cm/s    RVSP 38.8 mmHg    LVIDd 4.30 cm    LV A4C EF 69.1    POCT  GLUCOSE   Result Value Ref Range    POCT Glucose 88 74 - 99 mg/dL   Vancomycin   Result Value Ref Range    Vancomycin 10.7 5.0 - 20.0 ug/mL   POCT GLUCOSE   Result Value Ref Range    POCT Glucose 137 (H) 74 - 99 mg/dL   Basic Metabolic Panel   Result Value Ref Range    Glucose 112 (H) 74 - 99 mg/dL    Sodium 138 136 - 145 mmol/L    Potassium 4.1 3.5 - 5.3 mmol/L    Chloride 109 (H) 98 - 107 mmol/L    Bicarbonate 21 21 - 32 mmol/L    Anion Gap 12 10 - 20 mmol/L    Urea Nitrogen 56 (H) 6 - 23 mg/dL    Creatinine 3.37 (H) 0.50 - 1.05 mg/dL    eGFR 13 (L) >60 mL/min/1.73m*2    Calcium 8.9 8.6 - 10.3 mg/dL   CBC   Result Value Ref Range    WBC 9.4 4.4 - 11.3 x10*3/uL    nRBC 0.6 (H) 0.0 - 0.0 /100 WBCs    RBC 2.43 (L) 4.00 - 5.20 x10*6/uL    Hemoglobin 7.3 (L) 12.0 - 16.0 g/dL    Hematocrit 23.2 (L) 36.0 - 46.0 %    MCV 96 80 - 100 fL    MCH 30.0 26.0 - 34.0 pg    MCHC 31.5 (L) 32.0 - 36.0 g/dL    RDW 25.1 (H) 11.5 - 14.5 %    Platelets 273 150 - 450 x10*3/uL   POCT GLUCOSE   Result Value Ref Range    POCT Glucose 114 (H) 74 - 99 mg/dL     *Note: Due to a large number of results and/or encounters for the requested time period, some results have not been displayed. A complete set of results can be found in Results Review.        Assessment/Plan     Diabetic foot ulcer right foot  -Imaging suggestive of possible osteomyelitis. May also be related to gout.   -I will do an aspiration of the joint to evaluate for septic joint vs gout.   -note will be updated    Delmar Pyle DPM

## 2024-02-16 NOTE — PROGRESS NOTES
Nephrology Progress Note      Nephrology following for ESTEE on CKD.   Events over night:   Patient is on room air  Denies shortness of breath      /72   Pulse 73   Temp 36.8 °C (98.2 °F)   Resp 18   Ht 1.524 m (5')   Wt 75.7 kg (166 lb 14.2 oz)   SpO2 94%   BMI 32.59 kg/m²     Input / Output:  24 HR:   Intake/Output Summary (Last 24 hours) at 2/16/2024 1119  Last data filed at 2/16/2024 0726  Gross per 24 hour   Intake 750 ml   Output 1400 ml   Net -650 ml       Physical Exam   Alert and oriented x 3 NAD  Neck: no JVD  CV: RRR  Lungs: CTA bilaterally  Abd: soft, NT, ND   Ext: No lower extremity edema    Scheduled medications  amLODIPine, 5 mg, oral, Daily  aspirin, 81 mg, oral, Daily  cefepime, 500 mg, intravenous, q12h  cyanocobalamin, 1,000 mcg, oral, Daily  fluticasone, 1 spray, Each Nostril, Daily  [Held by provider] furosemide, 40 mg, intravenous, BID  insulin lispro, 0-15 Units, subcutaneous, Before meals & nightly  melatonin, 3 mg, oral, Daily  metoprolol tartrate, 25 mg, oral, Daily  metroNIDAZOLE, 500 mg, intravenous, q8h  pantoprazole, 40 mg, oral, Daily before breakfast   Or  pantoprazole, 40 mg, intravenous, Daily before breakfast  polyethylene glycol, 17 g, oral, Daily  pravastatin, 40 mg, oral, Nightly      Continuous medications     PRN medications  PRN medications: acetaminophen, bisacodyl, dextrose 10 % in water (D10W), dextrose, glucagon, guaiFENesin, meclizine, ondansetron **OR** ondansetron, oxygen, vancomycin   Results from last 7 days   Lab Units 02/16/24  0401 02/15/24  0555 02/14/24  1758   SODIUM mmol/L 138   < > 136   POTASSIUM mmol/L 4.1   < > 4.3   CHLORIDE mmol/L 109*   < > 106   CO2 mmol/L 21   < > 19*   BUN mg/dL 56*   < > 54*   CREATININE mg/dL 3.37*   < > 3.31*   CALCIUM mg/dL 8.9   < > 9.4   PROTEIN TOTAL g/dL  --   --  6.8   BILIRUBIN TOTAL mg/dL  --   --  0.6   ALK PHOS U/L  --   --  66   ALT U/L  --   --  11   AST U/L  --   --  19   GLUCOSE mg/dL 112*   < > 205*     < > = values in this interval not displayed.      Results from last 7 days   Lab Units 02/15/24  0555 02/14/24  1758 02/10/24  0443   MAGNESIUM mg/dL 1.68 1.77 1.65      Results from last 7 days   Lab Units 02/16/24  0401 02/15/24  0555 02/14/24  1758   WBC AUTO x10*3/uL 9.4 7.7 16.6*   HEMOGLOBIN g/dL 7.3* 6.8* 8.3*   HEMATOCRIT % 23.2* 22.0* 27.1*   PLATELETS AUTO x10*3/uL 273 259 335        Assessment & Plan:   Patient is 78 y.o. female who is admitted to hospital for weakness. Nephrology consulted in view of ESTEE on CKD.    Acute kidney injury on CKD stage IV  -Patient's baseline creatinine has been 2.6  -She does have a history of requiring dialysis in the past from December 2021 to March 2022.  -Recent creatinine 2.8 did bump to the 3.3 this admission-stable   -UA with no evidence of infection at this time, 2+ protein which is not new.  -Patient was given IV diuresis on admission-holding  -Cr stable     Anemia-acute on chronic  -Related to chronic kidney disease and MDS  -She is on high-dose Procrit weekly  -transfusion prn    Hypertension  -controlled     Volume status-appears euvolemic     Sepsis/ foot wound, MRI consistent with osteomyelitis    recs:    -No need for IV diuresis, will put her on her maintenance p.o. at slightly higher dose 60 mg daily  -BMP in a.m.  -No need for renal placement therapy     Please message me through Epoq chat with any questions or concerns.     Pinky Christie DO  2/16/2024  11:19 AM     [facility]    [address], [address]   [place] [address]  Office: [phone]

## 2024-02-16 NOTE — PROGRESS NOTES
Physical Therapy                 Therapy Communication Note    Patient Name: Tana Hargrove  MRN: [id number]  Today's Date: 2/16/2024     Discipline: Physical Therapy    Missed Visit Reason: Missed Visit Reason: Patient refused. Patient reports Podiatrist just finished with wound care of foot, too sore to work with therapy now. Also no notes from Podiatry in chart yet. Will hold eval, reattempt tomorrow, check Podiatry note for any WB restrictions or limitations to activity.    Missed Time: Attempt    Comment:

## 2024-02-16 NOTE — PROGRESS NOTES
Met with patient at bedside to speak about discharge plans. Patient adamant that she is not going to a SNF. She will use Home Care. TCC discussed possibility of toe amputation or long term IV ATB use through a PICC line due to OM in her toes. Podiatry consult is pending. Patient follows with Dr Pyle outpatient. She states she will still return home even if IV ATB are needed, TCC explained that she needs to have a teachable caregiver to be able to learn IV administration as well. She will be speaking with her children. TCC to follow.

## 2024-02-17 LAB
ANION GAP SERPL CALC-SCNC: 11 MMOL/L (ref 10–20)
BUN SERPL-MCNC: 58 MG/DL (ref 6–23)
CALCIUM SERPL-MCNC: 8.9 MG/DL (ref 8.6–10.3)
CHLORIDE SERPL-SCNC: 109 MMOL/L (ref 98–107)
CO2 SERPL-SCNC: 21 MMOL/L (ref 21–32)
CREAT SERPL-MCNC: 3.06 MG/DL (ref 0.5–1.05)
CRYSTALS FLD MICRO: NORMAL
EGFRCR SERPLBLD CKD-EPI 2021: 15 ML/MIN/1.73M*2
GLUCOSE BLD MANUAL STRIP-MCNC: 114 MG/DL (ref 74–99)
GLUCOSE BLD MANUAL STRIP-MCNC: 168 MG/DL (ref 74–99)
GLUCOSE BLD MANUAL STRIP-MCNC: 197 MG/DL (ref 74–99)
GLUCOSE BLD MANUAL STRIP-MCNC: 70 MG/DL (ref 74–99)
GLUCOSE SERPL-MCNC: 101 MG/DL (ref 74–99)
POTASSIUM SERPL-SCNC: 4.2 MMOL/L (ref 3.5–5.3)
SODIUM SERPL-SCNC: 137 MMOL/L (ref 136–145)

## 2024-02-17 PROCEDURE — 80048 BASIC METABOLIC PNL TOTAL CA: CPT | Performed by: INTERNAL MEDICINE

## 2024-02-17 PROCEDURE — 82947 ASSAY GLUCOSE BLOOD QUANT: CPT

## 2024-02-17 PROCEDURE — 2500000004 HC RX 250 GENERAL PHARMACY W/ HCPCS (ALT 636 FOR OP/ED): Performed by: INTERNAL MEDICINE

## 2024-02-17 PROCEDURE — 99233 SBSQ HOSP IP/OBS HIGH 50: CPT | Performed by: INTERNAL MEDICINE

## 2024-02-17 PROCEDURE — 2500000002 HC RX 250 W HCPCS SELF ADMINISTERED DRUGS (ALT 637 FOR MEDICARE OP, ALT 636 FOR OP/ED): Performed by: STUDENT IN AN ORGANIZED HEALTH CARE EDUCATION/TRAINING PROGRAM

## 2024-02-17 PROCEDURE — 2500000001 HC RX 250 WO HCPCS SELF ADMINISTERED DRUGS (ALT 637 FOR MEDICARE OP): Performed by: INTERNAL MEDICINE

## 2024-02-17 PROCEDURE — 2500000001 HC RX 250 WO HCPCS SELF ADMINISTERED DRUGS (ALT 637 FOR MEDICARE OP): Performed by: STUDENT IN AN ORGANIZED HEALTH CARE EDUCATION/TRAINING PROGRAM

## 2024-02-17 PROCEDURE — 2500000004 HC RX 250 GENERAL PHARMACY W/ HCPCS (ALT 636 FOR OP/ED): Performed by: STUDENT IN AN ORGANIZED HEALTH CARE EDUCATION/TRAINING PROGRAM

## 2024-02-17 PROCEDURE — 36415 COLL VENOUS BLD VENIPUNCTURE: CPT | Performed by: INTERNAL MEDICINE

## 2024-02-17 PROCEDURE — 1200000002 HC GENERAL ROOM WITH TELEMETRY DAILY

## 2024-02-17 RX ADMIN — INSULIN LISPRO 3 UNITS: 100 INJECTION, SOLUTION INTRAVENOUS; SUBCUTANEOUS at 12:12

## 2024-02-17 RX ADMIN — METRONIDAZOLE 500 MG: 500 INJECTION, SOLUTION INTRAVENOUS at 20:33

## 2024-02-17 RX ADMIN — CYANOCOBALAMIN TAB 1000 MCG 1000 MCG: 1000 TAB at 08:49

## 2024-02-17 RX ADMIN — METRONIDAZOLE 500 MG: 500 INJECTION, SOLUTION INTRAVENOUS at 12:25

## 2024-02-17 RX ADMIN — CEFEPIME 500 MG: 1 INJECTION, POWDER, FOR SOLUTION INTRAMUSCULAR; INTRAVENOUS at 20:33

## 2024-02-17 RX ADMIN — ASPIRIN 81 MG: 81 TABLET, COATED ORAL at 08:48

## 2024-02-17 RX ADMIN — METRONIDAZOLE 500 MG: 500 INJECTION, SOLUTION INTRAVENOUS at 04:16

## 2024-02-17 RX ADMIN — METOPROLOL TARTRATE 25 MG: 25 TABLET, FILM COATED ORAL at 08:51

## 2024-02-17 RX ADMIN — AMLODIPINE BESYLATE 5 MG: 5 TABLET ORAL at 08:39

## 2024-02-17 RX ADMIN — INSULIN LISPRO 3 UNITS: 100 INJECTION, SOLUTION INTRAVENOUS; SUBCUTANEOUS at 21:31

## 2024-02-17 RX ADMIN — PRAVASTATIN SODIUM 40 MG: 40 TABLET ORAL at 20:33

## 2024-02-17 RX ADMIN — CEFEPIME 500 MG: 1 INJECTION, POWDER, FOR SOLUTION INTRAMUSCULAR; INTRAVENOUS at 08:54

## 2024-02-17 RX ADMIN — FUROSEMIDE 60 MG: 40 TABLET ORAL at 08:50

## 2024-02-17 ASSESSMENT — COGNITIVE AND FUNCTIONAL STATUS - GENERAL
DAILY ACTIVITIY SCORE: 23
WALKING IN HOSPITAL ROOM: A LITTLE
MOBILITY SCORE: 20
CLIMB 3 TO 5 STEPS WITH RAILING: A LITTLE
WALKING IN HOSPITAL ROOM: A LITTLE
STANDING UP FROM CHAIR USING ARMS: A LITTLE
STANDING UP FROM CHAIR USING ARMS: A LITTLE
DAILY ACTIVITIY SCORE: 23
MOVING TO AND FROM BED TO CHAIR: A LITTLE
TOILETING: A LITTLE
MOVING TO AND FROM BED TO CHAIR: A LITTLE
CLIMB 3 TO 5 STEPS WITH RAILING: A LITTLE
TOILETING: A LITTLE
MOBILITY SCORE: 20

## 2024-02-17 ASSESSMENT — PAIN SCALES - GENERAL
PAINLEVEL_OUTOF10: 0 - NO PAIN

## 2024-02-17 ASSESSMENT — PAIN - FUNCTIONAL ASSESSMENT
PAIN_FUNCTIONAL_ASSESSMENT: 0-10
PAIN_FUNCTIONAL_ASSESSMENT: 0-10

## 2024-02-17 NOTE — PROGRESS NOTES
Physical Therapy                 Therapy Communication Note    Patient Name: Tana Hargrove  MRN: [id number]  Today's Date: 2/17/2024     Discipline: Physical Therapy    Missed Visit Reason: Missed Visit Reason: Upon review of physician notation, patient has yet to be assessed by podiatry.  PT to re-attempt evaluation once podiatry assesses to note weight bearing.    Missed Time: Attempt    Comment:

## 2024-02-17 NOTE — CARE PLAN
The patient's goals for the shift include      The clinical goals for the shift include Pt will remain free from falls or injury during my shift.        Problem: Skin  Goal: Decreased wound size/increased tissue granulation at next dressing change  Outcome: Progressing  Goal: Participates in plan/prevention/treatment measures  Outcome: Progressing  Goal: Prevent/manage excess moisture  Outcome: Progressing  Goal: Prevent/minimize sheer/friction injuries  Outcome: Progressing  Goal: Promote/optimize nutrition  Outcome: Progressing  Goal: Promote skin healing  Outcome: Progressing  Flowsheets (Taken 2/17/2024 0145)  Promote skin healing: Turn/reposition every 2 hours/use positioning/transfer devices     Problem: Pain - Adult  Goal: Verbalizes/displays adequate comfort level or baseline comfort level  Outcome: Progressing     Problem: Safety - Adult  Goal: Free from fall injury  Outcome: Progressing     Problem: Discharge Planning  Goal: Discharge to home or other facility with appropriate resources  Outcome: Progressing     Problem: Chronic Conditions and Co-morbidities  Goal: Patient's chronic conditions and co-morbidity symptoms are monitored and maintained or improved  Outcome: Progressing     Problem: Diabetes  Goal: Achieve decreasing blood glucose levels by end of shift  Outcome: Progressing  Goal: Increase stability of blood glucose readings by end of shift  Outcome: Progressing  Goal: Decrease in ketones present in urine by end of shift  Outcome: Progressing  Goal: Maintain electrolyte levels within acceptable range throughout shift  Outcome: Progressing  Goal: Maintain glucose levels >70mg/dl to <250mg/dl throughout shift  Outcome: Progressing  Goal: No changes in neurological exam by end of shift  Outcome: Progressing  Goal: Learn about and adhere to nutrition recommendations by end of shift  Outcome: Progressing  Goal: Vital signs within normal range for age by end of shift  Outcome: Progressing  Goal:  Increase self care and/or family involovement by end of shift  Outcome: Progressing  Goal: Receive DSME education by end of shift  Outcome: Progressing

## 2024-02-17 NOTE — PROGRESS NOTES
Tana Hargrove is a 78 y.o. female on day 3 of admission presenting with Sepsis without septic shock (CMS/[facility]).    Subjective   Tana Hargrove is a 78 y.o. female with PMHx s/f HTN, HLD, chronic diastolic heart failure, COPD (no baseline O2), CKD, NIDDM-II, MDS with chronic anemia requiring Procrit and intermittent PRBC transfusions, OA, GERD, who presents with multiple complaints.  Patient reports that her initial concerns this morning were generalized weakness, fatigue, nausea and a very large loose bowel movement.  She states that she had not had a bowel movement urinary however most recent hospitalization from 02/07/2024 till 02/11/2024 for ESTEE on CKD possibly in setting of myelodysplastic syndrome, urinary tract infection, poor p.o. intake of food/water.  She he had one episode of nonbloody nonbilious emesis, and her symptoms continue to worsen so she came in for additional evaluation. She also notes that she has been having worsening pain, redness, swelling associated with a wound on the bottom of her right big toe; which she reports seeing her podiatrist (Dr. Pyle) on a weekly basis.  She states that she has not seen Dr. Pyle in some time due to her most recent hospitalization.  She states that when she awoke this morning she felt very weak, generally unwell and has had very poor p.o. intake of food/water throughout the day.  She then called EMS to be brought in for further evaluation and management.  EMS had found her to be hypoxic and she was placed on 2 L via nasal cannula but otherwise her only other complaint was worsening right foot pain/swelling and redness and a significant amount of drainage.  She states that it was not draining during her most recent hospitalization but ever since returning home she has had a significant amount of yellow/green material expressed down on her sheets and socks.  She denied any additional injury to the area but did admit to a history of diabetes mellitus with  neuropathy.  She denies any recent sick contacts, chemical/environmental exposures, changes in dietary habits or any recent traumatic events/falls other than noted above.  She denies any fevers, chills, night sweats, vision changes, auditory changes, change in taste/smell, loss of bowel/bladder control, loss consciousness, dizziness, vertigo, syncope, seizure-like activity, chest pain, palpitations, coughing, wheezing, congestion, hemoptysis, hematemesis, abdominal pain, dysuria, hematuria, dyschezia, hematochezia any lateralizing motor/sensory deficits other than noted above.     ED Course (Summary):   Vitals on presentation: T98.1, /56, , RR 14, SpO2 92% RA (dropped to less than 89% on room air per discussion with the ED and required 2 L nasal cannula)  Labs: CBC with differential notable for WBC 16.6 with neutrophil predominance, Hgb 8.3, platelets 335.  Chemistries with glucose 205, sodium 136, potassium 4.3, BUN 54, serum creatinine 3.31, mag 1.77.  Lactate 1.5.  CRP 7.93/ESR 63.  High-sensitivity troponin 15.  .  Viral panel negative  Imaging: CXR with pulmonary edema. XR right foot with Severe osteoarthritis, soft tissue swelling (findings may reflect gout - MRI for additional correlation)  Interventions: 500 cc normal saline bolus, vancomycin/meropenem, 40 mg IV push Lasix    2/16: She is frustrated about her situation.     2/17: She was in better spirits today after learning Dr Pyle does not believe her MRI represents OM but chronic osteitis instead.          Objective     A Comprehensive greater than 10 system review of systems was carried out.  Pertinent positives and negatives are noted above.  Otherwise negative for contributory information.       Constitutional:       General: She is not in acute distress.     Appearance: Normal appearance. She is not ill-appearing or diaphoretic.   HENT:      Head: Normocephalic and atraumatic.      Mouth/Throat:      Mouth: Mucous membranes are  moist.      Pharynx: Oropharynx is clear.   Eyes:      Extraocular Movements: Extraocular movements intact.      Conjunctiva/sclera: Conjunctivae normal.      Pupils: Pupils are equal, round, and reactive to light.   Neck:      Vascular: No carotid bruit.   Cardiovascular:      Rate and Rhythm: Normal rate and regular rhythm.      Pulses: Normal pulses.      Heart sounds: Murmur heard.      Comments: Faint ejection murmur heard best at the left lower sternal border  Pulmonary:      Comments: Diminished breath sounds are all lung fields   Abdominal:      General: Abdomen is flat. Bowel sounds are normal. There is no distension.      Palpations: Abdomen is soft. There is no mass.      Tenderness: There is no abdominal tenderness. There is no guarding or rebound.   Musculoskeletal:      Cervical back: Normal range of motion and neck supple. No rigidity or tenderness.      Right lower leg: Edema present.      Left lower leg: Edema present.      Comments: 1+ pitting edema of bilateral lower extremities to just below the knee, upper extremity strength/range of motion is likely a 4 out of 5 with the lower extremities being ever so slightly less otherwise symmetric   Skin:     General: Skin is warm and dry.      Capillary Refill: Capillary refill takes less than 2 seconds.      Findings: Erythema and lesion present. No bruising or rash.      Comments: Patient does have an open wound at the base of the right great toe on the plantar aspect with minimal drainage that appears serosanguineous at this time but she does have significant erythema and swelling of the right great toe into the midfoot with slight tenderness to palpation in the midfoot but minimal tenderness to palpation around the wound and she states that she is barely able to tell me that she feels me palpating in that area.  She has a similar wound on the left foot but this is dry and not erythematous or swollen and is in there appears to be an eschar in the area  as well.,  Intact pulses in the DP/PT of bilateral lower extremities   Neurological:      Mental Status: She is alert and oriented to person, place, and time.      Cranial Nerves: No cranial nerve deficit.      Sensory: Sensory deficit present.      Motor: No weakness.      Coordination: Coordination normal.      Gait: Gait normal.      Comments: AOx4, finger-to-nose/heel-to-shin grossly intact bilaterally, range of motion/strength appears grossly intact but diminished in the upper extremities at about 4 out of 5 and slightly worse on the lower extremities.  Patient was able to sit up to the side of bed with minimal assistance and stand without assistance but was somewhat unsteady at which time she was placed back in bed pending further evaluation and management from an infectious standpoint she does have decreased sensation of bilateral lower extremities in the feet and she was barely able to tell me that I was touching her and did require a significant amount of pressure to elicit painful stimuli.   Psychiatric:         Mood and Affect: Mood normal.         Behavior: Behavior normal.         Thought Content: Thought content normal.         Judgment: Judgment normal.        Last Recorded Vitals  Blood pressure 139/58, pulse 76, temperature 36.9 °C (98.5 °F), temperature source Temporal, resp. rate 16, height 1.524 m (5'), weight 75.7 kg (166 lb 14.2 oz), SpO2 96 %.  Intake/Output last 3 Shifts:  I/O last 3 completed shifts:  In: 1800 (23.8 mL/kg) [P.O.:900; IV Piggyback:900]  Out: 2300 (30.4 mL/kg) [Urine:2300 (0.8 mL/kg/hr)]  Weight: 75.7 kg     Relevant Results                             Assessment/Plan   Sepsis without shock 2/2 diabetic foot wound with overlying cellulitis of the R great toe  -Source: DM foot wound/skin and soft tissue infection/OM  -End-organ dysfunction: ESTEE/CKD (but this has been variable)  -Lactate 1.5  -BP is normotensive  -Blood cultures x2. UA pending. Wound culture. Follow fever curve,  WBCs.  -Continue antibiotic coverage with Vanc/Cefepime/Flagyl as per sepsis guidelines in Zosyn intolerant patient   -Wound care consult appreciated   -Podiatry and ID consults    Discharge planning: If all cx remain negative, will treat for skin and soft tissue infection with doxycycline 100 q12 and keflex 500 q24 for 7 days. Low suspicion for deep abscess/septic arthritis or OM. MRI findings likely related to reactive osteitis      Acute on chronic diastolic heart failure   -Evidenced by presenting sxs: LE edema  -CXR with: pulmonary edema   -Echocardiogram update ordered, last completed 2022  -BNP: 100; mostly peripherally edematous  -Nephrology now managing diuresis and have switched to her home oral Lasix   -Monitor renal function closely with diuresis   -Monitor electrolytes and replenish generously as needed   -Strict I&O’s   -2g salt restriction, 2L fluid restriction   -Monitor fluid status closely   -Tele     Acute hypoxic respiratory failure   -weaned to RA     CKD IV  -Baseline sCr has been variable since the beginning of the year, ~2.6-3.4  -On admission: 3.31 with BUN 54  -Follow UOP / Is & Os  -Neph consulting     HTN, HLD  -Continue home therapies      COPD without acute exacerbation   -Continue home therapies      NIDDM-II with hyperglycemia   -SSI ACHS  -Accucheks   -Hypoglycemic protocol   -Monitor and adjust as needed     MDS with chronic anemia requiring Procrit and intermittent PRBC transfusions  -Hgb / anemia near baseline   -Continue to follow while admitted (on 2/15 Hbg did dip to 6.8, slightly below her average, this appears to be dilutional as compared to the 2/14 CBC. She was above 7 g/dL on 2/16)      Generalized weakness, acute on chronic deconditioning   -Likely 2/2 above   -Continue management  -PT/OT/CM consults appreciated, patient adamantly refuses SNF.       Pollo Herbert MD PhD

## 2024-02-17 NOTE — PROGRESS NOTES
[facility] INFECTIOUS DISEASE PROGRESS NOTE    Patient Name: Tana Hargrove  MRN: [id number]    INTERVAL HISTORY:   No new interval events    Assessment/Plan   Sepsis  Right foot plantar ulcer   Myelodysplastic syndrome  HTN  HLD  CHF  COPD  CKD  DM  MDS  GERD     Suggest:  Right foot wound on plantar surface, wound is chronic, yellow discharge at base.  MRI reviewed- images reviewed by podiatry- more consistent with reactive osteitis than OM.   S/p  MTP joint aspiration to r/o septic arthritis   Aspirate cx ngtd  Blood cx negative  C/w vancomycin   C/w cefepime   C/w flagyl   Trend wbc and temps    Discharge planning: If all cx remain negative, will treat for skin and soft tissue infection with doxycycline 100 q12 and keflex 500 q24 for 7 days. Low suspicion for deep abscess/septic arthritis or OM. MRI findings likely related to reactive osteitis    D/w Dr. Pyle    PHYSICAL EXAM:  Vital signs: /67 (BP Location: Right arm, Patient Position: Lying)   Pulse 74   Temp 36.7 °C (98 °F) (Temporal)   Resp 17   Ht 1.524 m (5')   Wt 75.7 kg (166 lb 14.2 oz)   SpO2 93%   BMI 32.59 kg/m²   Temp (24hrs), Av.6 °C (97.8 °F), Min:36.2 °C (97.2 °F), Max:36.8 °C (98.2 °F)    General: alert, oriented, NAD  Lungs: bilaterally clear to auscultation  Heart: regular rate and rhythm  Abdomen: soft, non tender, non distended, BS+  Extremities: no edema  No rashes  No joint inflammation  Neck supple  Lines ok  No CVAT    Labs:  reviewed    Microbiology data: reviewed    Imaging data: reviewed      Vance Obregon   Pager:[phone]  Date of service: 2024  Time of service: 1:41 PM

## 2024-02-17 NOTE — PROGRESS NOTES
Nephrology Progress Note      Nephrology following for ESTEE on CKD.   Events over night:     No acute events overnight  Feels okay  No shortness of breath  No abdominal pain  Tolerating p.o. intake      /69   Pulse 68   Temp 36.8 °C (98.2 °F) (Temporal)   Resp 16   Ht 1.524 m (5')   Wt 75.7 kg (166 lb 14.2 oz)   SpO2 92%   BMI 32.59 kg/m²     Input / Output:  24 HR:   Intake/Output Summary (Last 24 hours) at 2/17/2024 0808  Last data filed at 2/17/2024 0542  Gross per 24 hour   Intake 1200 ml   Output 1300 ml   Net -100 ml         Physical Exam   Alert and oriented x 3 NAD  Neck: no JVD  CV: RRR  Lungs: CTA bilaterally  Abd: soft, NT, ND   Ext: No lower extremity edema    Scheduled medications  amLODIPine, 5 mg, oral, Daily  aspirin, 81 mg, oral, Daily  cefepime, 500 mg, intravenous, q12h  cyanocobalamin, 1,000 mcg, oral, Daily  fluticasone, 1 spray, Each Nostril, Daily  furosemide, 60 mg, oral, Daily  insulin lispro, 0-15 Units, subcutaneous, Before meals & nightly  melatonin, 3 mg, oral, Daily  metoprolol tartrate, 25 mg, oral, Daily  metroNIDAZOLE, 500 mg, intravenous, q8h  pantoprazole, 40 mg, oral, Daily before breakfast   Or  pantoprazole, 40 mg, intravenous, Daily before breakfast  polyethylene glycol, 17 g, oral, Daily  pravastatin, 40 mg, oral, Nightly      Continuous medications     PRN medications  PRN medications: acetaminophen, bisacodyl, dextrose 10 % in water (D10W), dextrose, glucagon, guaiFENesin, meclizine, ondansetron **OR** ondansetron, oxygen, vancomycin   Results from last 7 days   Lab Units 02/17/24  0425 02/15/24  0555 02/14/24  1758   SODIUM mmol/L 137   < > 136   POTASSIUM mmol/L 4.2   < > 4.3   CHLORIDE mmol/L 109*   < > 106   CO2 mmol/L 21   < > 19*   BUN mg/dL 58*   < > 54*   CREATININE mg/dL 3.06*   < > 3.31*   CALCIUM mg/dL 8.9   < > 9.4   PROTEIN TOTAL g/dL  --   --  6.8   BILIRUBIN TOTAL mg/dL  --   --  0.6   ALK PHOS U/L  --   --  66   ALT U/L  --   --  11   AST  U/L  --   --  19   GLUCOSE mg/dL 101*   < > 205*    < > = values in this interval not displayed.        Results from last 7 days   Lab Units 02/15/24  0555 02/14/24  1758   MAGNESIUM mg/dL 1.68 1.77        Results from last 7 days   Lab Units 02/16/24  0401 02/15/24  0555 02/14/24  1758   WBC AUTO x10*3/uL 9.4 7.7 16.6*   HEMOGLOBIN g/dL 7.3* 6.8* 8.3*   HEMATOCRIT % 23.2* 22.0* 27.1*   PLATELETS AUTO x10*3/uL 273 259 335          Assessment & Plan:   Patient is 78 y.o. female who is admitted to hospital for weakness. Nephrology consulted in view of ESTEE on CKD.    Acute kidney injury on CKD stage IV  -Patient's baseline creatinine has been 2.6  -She does have a history of requiring dialysis in the past from December 2021 to March 2022.  -Recent creatinine 2.8 did bump to the 3.3 this admission-stable   -UA with no evidence of infection at this time, 2+ protein which is not new.     -Overall stable from renal standpoint today serum creatinine peaked at 3.3 mg/dL and improved to 3.0 mg/dL today   -Lasix 60 mg initiated on 2/16/2024   -Patient is nonoliguric     Anemia-acute on chronic  -Related to chronic kidney disease and MDS  -She is on high-dose Procrit weekly  -transfusion prn   -Hemoglobin is relatively stable at 7.3 g/dL today    Hypertension  -controlled    -Currently on amlodipine 5 mg daily, metoprolol 25 mg daily and Lasix 60 mg    Volume status   -She is euvolemic on exam maintain at 60 mg p.o. Lasix     Sepsis/ foot wound, MRI consistent with osteomyelitis   -Vancomycin, cefepime, metronidazole per ID        Recommendations  -Overall stable from renal standpoint  -Renal panel in the morning  -Okay to maintain maintenance Lasix of 60 mg daily  -Avoid hypotension, keep MAP greater than 65  -No need for renal placement therapy      Please message me through [facility] chat with any questions or concerns.     Paco Paniagua MD  2/17/2024  8:08 AM     [facility]    [address], [address]   [place] [address]  Office: [phone]

## 2024-02-18 ENCOUNTER — PHARMACY VISIT (OUTPATIENT)
Dept: PHARMACY | Facility: CLINIC | Age: 79
End: 2024-02-18
Payer: MEDICARE

## 2024-02-18 VITALS
HEART RATE: 82 BPM | RESPIRATION RATE: 18 BRPM | DIASTOLIC BLOOD PRESSURE: 71 MMHG | BODY MASS INDEX: 32.76 KG/M2 | OXYGEN SATURATION: 92 % | SYSTOLIC BLOOD PRESSURE: 153 MMHG | WEIGHT: 166.89 LBS | HEIGHT: 60 IN | TEMPERATURE: 99.1 F

## 2024-02-18 PROBLEM — I50.33 ACUTE ON CHRONIC DIASTOLIC HEART FAILURE (MULTI): Status: RESOLVED | Noted: 2024-02-14 | Resolved: 2024-02-18

## 2024-02-18 PROBLEM — A41.9 SEPSIS WITHOUT SEPTIC SHOCK (MULTI): Status: RESOLVED | Noted: 2024-02-14 | Resolved: 2024-02-18

## 2024-02-18 PROBLEM — J96.01 ACUTE HYPOXIC RESPIRATORY FAILURE (MULTI): Status: RESOLVED | Noted: 2024-02-14 | Resolved: 2024-02-18

## 2024-02-18 LAB
ANION GAP SERPL CALC-SCNC: 13 MMOL/L (ref 10–20)
ATRIAL RATE: 89 BPM
BUN SERPL-MCNC: 61 MG/DL (ref 6–23)
CALCIUM SERPL-MCNC: 9.1 MG/DL (ref 8.6–10.3)
CHLORIDE SERPL-SCNC: 108 MMOL/L (ref 98–107)
CO2 SERPL-SCNC: 21 MMOL/L (ref 21–32)
CREAT SERPL-MCNC: 3.07 MG/DL (ref 0.5–1.05)
EGFRCR SERPLBLD CKD-EPI 2021: 15 ML/MIN/1.73M*2
GLUCOSE BLD MANUAL STRIP-MCNC: 112 MG/DL (ref 74–99)
GLUCOSE BLD MANUAL STRIP-MCNC: 185 MG/DL (ref 74–99)
GLUCOSE SERPL-MCNC: 113 MG/DL (ref 74–99)
P AXIS: 49 DEGREES
POTASSIUM SERPL-SCNC: 4.2 MMOL/L (ref 3.5–5.3)
PR INTERVAL: 159 MS
Q ONSET: 251 MS
QRS COUNT: 14 BEATS
QRS DURATION: 85 MS
QT INTERVAL: 369 MS
QTC CALCULATION(BAZETT): 449 MS
QTC FREDERICIA: 420 MS
R AXIS: -3 DEGREES
SODIUM SERPL-SCNC: 138 MMOL/L (ref 136–145)
T AXIS: 43 DEGREES
T OFFSET: 436 MS
VANCOMYCIN SERPL-MCNC: 15.8 UG/ML (ref 5–20)
VENTRICULAR RATE: 89 BPM

## 2024-02-18 PROCEDURE — 80202 ASSAY OF VANCOMYCIN: CPT | Performed by: PHARMACIST

## 2024-02-18 PROCEDURE — 2500000001 HC RX 250 WO HCPCS SELF ADMINISTERED DRUGS (ALT 637 FOR MEDICARE OP): Performed by: STUDENT IN AN ORGANIZED HEALTH CARE EDUCATION/TRAINING PROGRAM

## 2024-02-18 PROCEDURE — 2500000004 HC RX 250 GENERAL PHARMACY W/ HCPCS (ALT 636 FOR OP/ED): Performed by: INTERNAL MEDICINE

## 2024-02-18 PROCEDURE — 99239 HOSP IP/OBS DSCHRG MGMT >30: CPT | Performed by: INTERNAL MEDICINE

## 2024-02-18 PROCEDURE — C9113 INJ PANTOPRAZOLE SODIUM, VIA: HCPCS | Performed by: STUDENT IN AN ORGANIZED HEALTH CARE EDUCATION/TRAINING PROGRAM

## 2024-02-18 PROCEDURE — 36415 COLL VENOUS BLD VENIPUNCTURE: CPT | Performed by: INTERNAL MEDICINE

## 2024-02-18 PROCEDURE — RXMED WILLOW AMBULATORY MEDICATION CHARGE

## 2024-02-18 PROCEDURE — 2500000004 HC RX 250 GENERAL PHARMACY W/ HCPCS (ALT 636 FOR OP/ED): Performed by: STUDENT IN AN ORGANIZED HEALTH CARE EDUCATION/TRAINING PROGRAM

## 2024-02-18 PROCEDURE — 82947 ASSAY GLUCOSE BLOOD QUANT: CPT

## 2024-02-18 PROCEDURE — 82374 ASSAY BLOOD CARBON DIOXIDE: CPT | Performed by: INTERNAL MEDICINE

## 2024-02-18 PROCEDURE — 2500000001 HC RX 250 WO HCPCS SELF ADMINISTERED DRUGS (ALT 637 FOR MEDICARE OP): Performed by: INTERNAL MEDICINE

## 2024-02-18 RX ORDER — METOPROLOL TARTRATE 25 MG/1
25 TABLET, FILM COATED ORAL DAILY
Qty: 30 TABLET | Refills: 11 | Status: SHIPPED | OUTPATIENT
Start: 2024-02-18 | End: 2024-04-16 | Stop reason: SDUPTHER

## 2024-02-18 RX ORDER — PREGABALIN 50 MG/1
100 CAPSULE ORAL ONCE
Status: DISCONTINUED | OUTPATIENT
Start: 2024-02-18 | End: 2024-02-18 | Stop reason: HOSPADM

## 2024-02-18 RX ORDER — CEPHALEXIN 500 MG/1
500 CAPSULE ORAL DAILY
Qty: 7 CAPSULE | Refills: 0 | Status: SHIPPED | OUTPATIENT
Start: 2024-02-18 | End: 2024-02-19 | Stop reason: HOSPADM

## 2024-02-18 RX ORDER — DOXYCYCLINE 100 MG/1
100 CAPSULE ORAL 2 TIMES DAILY
Qty: 14 CAPSULE | Refills: 0 | Status: SHIPPED | OUTPATIENT
Start: 2024-02-18 | End: 2024-02-19 | Stop reason: HOSPADM

## 2024-02-18 RX ORDER — VANCOMYCIN HYDROCHLORIDE 500 MG/100ML
500 INJECTION, SOLUTION INTRAVENOUS ONCE
Status: DISCONTINUED | OUTPATIENT
Start: 2024-02-18 | End: 2024-02-18 | Stop reason: HOSPADM

## 2024-02-18 RX ORDER — FUROSEMIDE 40 MG/1
40 TABLET ORAL DAILY
Status: DISCONTINUED | OUTPATIENT
Start: 2024-02-19 | End: 2024-02-18 | Stop reason: HOSPADM

## 2024-02-18 RX ADMIN — ASPIRIN 81 MG: 81 TABLET, COATED ORAL at 09:57

## 2024-02-18 RX ADMIN — METOPROLOL TARTRATE 25 MG: 25 TABLET, FILM COATED ORAL at 09:59

## 2024-02-18 RX ADMIN — CYANOCOBALAMIN TAB 1000 MCG 1000 MCG: 1000 TAB at 09:57

## 2024-02-18 RX ADMIN — FUROSEMIDE 60 MG: 40 TABLET ORAL at 09:57

## 2024-02-18 RX ADMIN — METRONIDAZOLE 500 MG: 500 INJECTION, SOLUTION INTRAVENOUS at 04:15

## 2024-02-18 RX ADMIN — PANTOPRAZOLE SODIUM 40 MG: 40 INJECTION, POWDER, FOR SOLUTION INTRAVENOUS at 05:12

## 2024-02-18 RX ADMIN — AMLODIPINE BESYLATE 5 MG: 5 TABLET ORAL at 09:57

## 2024-02-18 RX ADMIN — CEFEPIME 500 MG: 1 INJECTION, POWDER, FOR SOLUTION INTRAMUSCULAR; INTRAVENOUS at 09:56

## 2024-02-18 ASSESSMENT — COGNITIVE AND FUNCTIONAL STATUS - GENERAL
MOVING TO AND FROM BED TO CHAIR: A LITTLE
DAILY ACTIVITIY SCORE: 23
MOBILITY SCORE: 20
TOILETING: A LITTLE
WALKING IN HOSPITAL ROOM: A LITTLE
STANDING UP FROM CHAIR USING ARMS: A LITTLE
CLIMB 3 TO 5 STEPS WITH RAILING: A LITTLE

## 2024-02-18 ASSESSMENT — PAIN - FUNCTIONAL ASSESSMENT: PAIN_FUNCTIONAL_ASSESSMENT: 0-10

## 2024-02-18 ASSESSMENT — PAIN SCALES - GENERAL: PAINLEVEL_OUTOF10: 5 - MODERATE PAIN

## 2024-02-18 NOTE — PROGRESS NOTES
Nephrology Progress Note      Nephrology following for ESTEE on CKD.   Events over night:     No acute events overnight  Feels okay  Did not sleep very well  No shortness of breath or chest pain    /71 (BP Location: Right arm, Patient Position: Lying)   Pulse 82   Temp 37.3 °C (99.1 °F) (Temporal)   Resp 18   Ht 1.524 m (5')   Wt 75.7 kg (166 lb 14.2 oz)   SpO2 92%   BMI 32.59 kg/m²     Input / Output:  24 HR:   Intake/Output Summary (Last 24 hours) at 2/18/2024 1143  Last data filed at 2/18/2024 0956  Gross per 24 hour   Intake 370 ml   Output 1100 ml   Net -730 ml         Physical Exam   Alert and oriented x 3 NAD  Neck: no JVD  CV: RRR  Lungs: CTA bilaterally  Abd: soft, NT, ND   Ext: No lower extremity edema    Scheduled medications  amLODIPine, 5 mg, oral, Daily  aspirin, 81 mg, oral, Daily  cefepime, 500 mg, intravenous, q12h  cyanocobalamin, 1,000 mcg, oral, Daily  fluticasone, 1 spray, Each Nostril, Daily  furosemide, 60 mg, oral, Daily  insulin lispro, 0-15 Units, subcutaneous, Before meals & nightly  melatonin, 3 mg, oral, Daily  metoprolol tartrate, 25 mg, oral, Daily  metroNIDAZOLE, 500 mg, intravenous, q8h  pantoprazole, 40 mg, oral, Daily before breakfast   Or  pantoprazole, 40 mg, intravenous, Daily before breakfast  polyethylene glycol, 17 g, oral, Daily  pravastatin, 40 mg, oral, Nightly  pregabalin, 100 mg, oral, Once  vancomycin in dextrose 5 %, 500 mg, intravenous, Once      Continuous medications     PRN medications  PRN medications: acetaminophen, bisacodyl, dextrose 10 % in water (D10W), dextrose, glucagon, guaiFENesin, meclizine, ondansetron **OR** ondansetron, oxygen, vancomycin   Results from last 7 days   Lab Units 02/18/24  0520 02/15/24  0555 02/14/24  2068   SODIUM mmol/L 138   < > 136   POTASSIUM mmol/L 4.2   < > 4.3   CHLORIDE mmol/L 108*   < > 106   CO2 mmol/L 21   < > 19*   BUN mg/dL 61*   < > 54*   CREATININE mg/dL 3.07*   < > 3.31*   CALCIUM mg/dL 9.1   < > 9.4    PROTEIN TOTAL g/dL  --   --  6.8   BILIRUBIN TOTAL mg/dL  --   --  0.6   ALK PHOS U/L  --   --  66   ALT U/L  --   --  11   AST U/L  --   --  19   GLUCOSE mg/dL 113*   < > 205*    < > = values in this interval not displayed.        Results from last 7 days   Lab Units 02/15/24  0555 02/14/24  1758   MAGNESIUM mg/dL 1.68 1.77        Results from last 7 days   Lab Units 02/16/24  0401 02/15/24  0555 02/14/24  1758   WBC AUTO x10*3/uL 9.4 7.7 16.6*   HEMOGLOBIN g/dL 7.3* 6.8* 8.3*   HEMATOCRIT % 23.2* 22.0* 27.1*   PLATELETS AUTO x10*3/uL 273 259 335          Assessment & Plan:   Patient is 78 y.o. female who is admitted to hospital for weakness. Nephrology consulted in view of ESTEE on CKD.    Acute kidney injury on CKD stage IV  -Patient's baseline creatinine has been 2.6  -She does have a history of requiring dialysis in the past from December 2021 to March 2022.  -Recent creatinine 2.8 did bump to the 3.3 this admission-stable   -UA with no evidence of infection at this time, 2+ protein which is not new.     -Overall stable from renal standpoint today serum creatinine peaked at 3.3 mg/dL and improved to 3.0 mg/dL today   -Lasix 60 mg initiated on 2/16/2024   -Patient is nonoliguric   -Serum creatinine is relatively stable at 3.0 mg/dL     Anemia-acute on chronic  -Related to chronic kidney disease and MDS  -She is on high-dose Procrit weekly  -transfusion prn   -Hemoglobin is relatively stable at 7.3 g/dL today    Hypertension  -controlled    -Currently on amlodipine 5 mg daily, metoprolol 25 mg daily and Lasix 60 mg    Volume status   -She is euvolemic on exam maintain at 60 mg p.o. Lasix     Sepsis/ foot wound, MRI consistent with osteomyelitis   -Vancomycin, cefepime, metronidazole per ID        Recommendations  -Overall stable from renal standpoint  -Decrease Lasix to 40 mg daily volume looks close to euvolemic to me  -Avoid hypotension, keep MAP greater than 65  -No need for renal placement therapy      Please  message me through [facility] chat with any questions or concerns.     Paco Paniagua MD  2/18/2024  11:43 AM     [facility]    [address], [address]   [place] [address]  Office: [phone]

## 2024-02-18 NOTE — DISCHARGE SUMMARY
DISCHARGE SUMMARY     Discharge Diagnosis  Sepsis without septic shock (CMS/[facility])    Issues Requiring Follow-Up  Continued OP podiatry F/U     This discharge took greater than 35 minutes.    Test Results Pending At Discharge  Pending Labs       Order Current Status    Blood Culture Preliminary result    Blood Culture Preliminary result    Tissue/Wound Culture/Smear Preliminary result    Tissue/Wound Culture/Smear Preliminary result            Hospital Course   Tana Hargrove is a 78 y.o. female with PMHx s/f HTN, HLD, chronic diastolic heart failure, COPD (no baseline O2), CKD, NIDDM-II, MDS with chronic anemia requiring Procrit and intermittent PRBC transfusions, OA, GERD, who presents with multiple complaints.  Patient reports that her initial concerns this morning were generalized weakness, fatigue, nausea and a very large loose bowel movement.  She states that she had not had a bowel movement urinary however most recent hospitalization from 02/07/2024 till 02/11/2024 for ESTEE on CKD possibly in setting of myelodysplastic syndrome, urinary tract infection, poor p.o. intake of food/water.  She he had one episode of nonbloody nonbilious emesis, and her symptoms continue to worsen so she came in for additional evaluation. She also notes that she has been having worsening pain, redness, swelling associated with a wound on the bottom of her right big toe; which she reports seeing her podiatrist (Dr. Pyle) on a weekly basis.  She states that she has not seen Dr. Pyle in some time due to her most recent hospitalization.  She states that when she awoke this morning she felt very weak, generally unwell and has had very poor p.o. intake of food/water throughout the day.  She then called EMS to be brought in for further evaluation and management.  EMS had found her to be hypoxic and she was placed on 2 L via nasal cannula but otherwise her only other complaint was worsening right foot pain/swelling and redness and a  significant amount of drainage.  She states that it was not draining during her most recent hospitalization but ever since returning home she has had a significant amount of yellow/green material expressed down on her sheets and socks.  She denied any additional injury to the area but did admit to a history of diabetes mellitus with neuropathy.  She denies any recent sick contacts, chemical/environmental exposures, changes in dietary habits or any recent traumatic events/falls other than noted above.  She denies any fevers, chills, night sweats, vision changes, auditory changes, change in taste/smell, loss of bowel/bladder control, loss consciousness, dizziness, vertigo, syncope, seizure-like activity, chest pain, palpitations, coughing, wheezing, congestion, hemoptysis, hematemesis, abdominal pain, dysuria, hematuria, dyschezia, hematochezia any lateralizing motor/sensory deficits other than noted above.     ED Course (Summary):   Vitals on presentation: T98.1, /56, , RR 14, SpO2 92% RA (dropped to less than 89% on room air per discussion with the ED and required 2 L nasal cannula)  Labs: CBC with differential notable for WBC 16.6 with neutrophil predominance, Hgb 8.3, platelets 335.  Chemistries with glucose 205, sodium 136, potassium 4.3, BUN 54, serum creatinine 3.31, mag 1.77.  Lactate 1.5.  CRP 7.93/ESR 63.  High-sensitivity troponin 15.  .  Viral panel negative  Imaging: CXR with pulmonary edema. XR right foot with Severe osteoarthritis, soft tissue swelling (findings may reflect gout - MRI for additional correlation)  Interventions: 500 cc normal saline bolus, vancomycin/meropenem, 40 mg IV push Lasix     2/16: She is frustrated about her situation.      2/17: She was in better spirits today after learning Dr Pyle does not believe her MRI represents OM but chronic osteitis instead.    Sepsis without shock 2/2 diabetic foot wound with overlying cellulitis of the R great toe  -Source: DM  foot wound/skin and soft tissue infection/OM  -End-organ dysfunction: ESTEE/CKD (but this has been variable)  -Lactate 1.5  -BP is normotensive  -Blood cultures NGTD. Wound culture NGTD. Follow fever curve, WBCs.  -Transitioned Vanc/Cefepime/Flagyl to doxycycline 100 q12 and keflex 500 q24 for 7 days  -Wound care consult appreciated   -Podiatry and ID consults   -MRI findings likely related to reactive osteitis per Dr Pyle      Acute on chronic diastolic heart failure   -Evidenced by presenting sxs: LE edema  -CXR with: pulmonary edema   -BNP: 100; mostly peripherally edematous  -Nephrology now managing diuresis and have switched to her home oral Lasix   -2g salt restriction, 2L fluid restriction      Acute hypoxic respiratory failure   -weaned to RA     CKD IV  -Baseline sCr has been variable since the beginning of the year, ~2.6-3.4  -On admission: 3.31 with BUN 54  -Neph consulting     HTN, HLD  -Continue home therapies      COPD without acute exacerbation   -Continue home therapies      NIDDM-II with hyperglycemia   -Continue home therapies      MDS with chronic anemia requiring Procrit and intermittent PRBC transfusions  -Hgb / anemia near baseline   -Continue to follow while admitted (on 2/15 Hbg did dip to 6.8, slightly below her average, this appears to be dilutional as compared to the 2/14 CBC. She was above 7 g/dL on 2/16)      Generalized weakness, acute on chronic deconditioning   -Likely 2/2 above   -Continue management  -PT/OT/CM consults appreciated, patient adamantly refuses SNF. [facility] ordered     Pertinent Physical Exam At Time of Discharge  Constitutional:       General: She is not in acute distress.     Appearance: Normal appearance. She is not ill-appearing or diaphoretic.   HENT:      Head: Normocephalic and atraumatic.      Mouth/Throat:      Mouth: Mucous membranes are moist.      Pharynx: Oropharynx is clear.   Eyes:      Extraocular Movements: Extraocular movements intact.       Conjunctiva/sclera: Conjunctivae normal.      Pupils: Pupils are equal, round, and reactive to light.   Neck:      Vascular: No carotid bruit.   Cardiovascular:      Rate and Rhythm: Normal rate and regular rhythm.      Pulses: Normal pulses.      Heart sounds: Murmur heard.      Comments: Faint ejection murmur heard best at the left lower sternal border  Pulmonary:      Comments: Diminished breath sounds are all lung fields   Abdominal:      General: Abdomen is flat. Bowel sounds are normal. There is no distension.      Palpations: Abdomen is soft. There is no mass.      Tenderness: There is no abdominal tenderness. There is no guarding or rebound.   Musculoskeletal:      Cervical back: Normal range of motion and neck supple. No rigidity or tenderness.      Right lower leg: Edema present.      Left lower leg: Edema present.      Comments: 1+ pitting edema of bilateral lower extremities to just below the knee, upper extremity strength/range of motion is likely a 4 out of 5 with the lower extremities being ever so slightly less otherwise symmetric   Skin:     General: Skin is warm and dry.      Capillary Refill: Capillary refill takes less than 2 seconds.      Findings: Erythema and lesion present. No bruising or rash.      Comments: Patient does have an open wound at the base of the right great toe on the plantar aspect with minimal drainage that appears serosanguineous at this time but she does have significant erythema and swelling of the right great toe into the midfoot with slight tenderness to palpation in the midfoot but minimal tenderness to palpation around the wound and she states that she is barely able to tell me that she feels me palpating in that area.  She has a similar wound on the left foot but this is dry and not erythematous or swollen and is in there appears to be an eschar in the area as well.,  Intact pulses in the DP/PT of bilateral lower extremities   Neurological:      Mental Status: She is  alert and oriented to person, place, and time.      Cranial Nerves: No cranial nerve deficit.      Sensory: Sensory deficit present.      Motor: No weakness.      Coordination: Coordination normal.      Gait: Gait normal.      Comments: AOx4, finger-to-nose/heel-to-shin grossly intact bilaterally, range of motion/strength appears grossly intact but diminished in the upper extremities at about 4 out of 5 and slightly worse on the lower extremities.  Patient was able to sit up to the side of bed with minimal assistance and stand without assistance but was somewhat unsteady at which time she was placed back in bed pending further evaluation and management from an infectious standpoint she does have decreased sensation of bilateral lower extremities in the feet and she was barely able to tell me that I was touching her and did require a significant amount of pressure to elicit painful stimuli.   Psychiatric:         Mood and Affect: Mood normal.         Behavior: Behavior normal.         Thought Content: Thought content normal.         Judgment: Judgment normal.     Home Medications     Medication List      START taking these medications     cephalexin 500 mg capsule; Commonly known as: Keflex; Take 1 capsule   (500 mg) by mouth once daily for 7 days.   doxycycline 100 mg capsule; Commonly known as: Vibramycin; Take 1   capsule (100 mg) by mouth 2 times a day for 7 days. Take with at least 8   ounces (large glass) of water, do not lie down for 30 minutes after     CHANGE how you take these medications     metoprolol tartrate 25 mg tablet; Commonly known as: Lopressor; Take 1   tablet (25 mg) by mouth once daily.; What changed: how much to take, when   to take this, additional instructions     CONTINUE taking these medications     acetaminophen 500 mg tablet; Commonly known as: Tylenol   allopurinol 100 mg tablet; Commonly known as: Zyloprim; Take 1 tablet   (100 mg) by mouth once daily.   amLODIPine 5 mg tablet;  Commonly known as: Norvasc   aspirin 81 mg EC tablet   cholecalciferol 50 MCG (2000 UT) tablet; Commonly known as: Vitamin D-3   cyanocobalamin 1,000 mcg tablet; Commonly known as: Vitamin B-12   fluticasone 50 mcg/actuation nasal spray; Commonly known as: Flonase   furosemide 20 mg tablet; Commonly known as: Lasix; TAKE 1 TABLET BY   MOUTH ONCE DAILY   meclizine 12.5 mg tablet; Commonly known as: Antivert; Take 1 tablet   (12.5 mg) by mouth 2 times a day as needed for dizziness.   pioglitazone 15 mg tablet; Commonly known as: Actos; Take 1 tablet (15   mg) by mouth once daily.   pravastatin 40 mg tablet; Commonly known as: Pravachol; Take 1 tablet   (40 mg) by mouth once daily at bedtime.   pregabalin 100 mg capsule; Commonly known as: Lyrica; TAKE ONE CAPSULE   BY MOUTH TWICE DAILY @9AM & 5PM   Procrit 10,000 unit/mL injection; Generic drug: epoetin alejandra   SITagliptin phosphate 100 mg tablet; Commonly known as: Januvia; Take 1   tablet (100 mg) by mouth once daily.       Outpatient Follow-Up  No follow-ups on file.     Pollo Herbert MD PhD  2/18/2024  9:57 AM

## 2024-02-18 NOTE — CARE PLAN
Problem: Skin  Goal: Decreased wound size/increased tissue granulation at next dressing change  Outcome: Progressing  Goal: Participates in plan/prevention/treatment measures  Outcome: Progressing  Goal: Prevent/manage excess moisture  Outcome: Progressing  Goal: Prevent/minimize sheer/friction injuries  Outcome: Progressing  Goal: Promote/optimize nutrition  Outcome: Progressing  Goal: Promote skin healing  Outcome: Progressing  Flowsheets (Taken 2/17/2024 9073)  Promote skin healing: Turn/reposition every 2 hours/use positioning/transfer devices     Problem: Pain - Adult  Goal: Verbalizes/displays adequate comfort level or baseline comfort level  Outcome: Progressing     Problem: Safety - Adult  Goal: Free from fall injury  Outcome: Progressing     Problem: Discharge Planning  Goal: Discharge to home or other facility with appropriate resources  Outcome: Progressing     Problem: Chronic Conditions and Co-morbidities  Goal: Patient's chronic conditions and co-morbidity symptoms are monitored and maintained or improved  Outcome: Progressing     Problem: Diabetes  Goal: Achieve decreasing blood glucose levels by end of shift  Outcome: Progressing  Goal: Increase stability of blood glucose readings by end of shift  Outcome: Progressing  Goal: Decrease in ketones present in urine by end of shift  Outcome: Progressing  Goal: Maintain electrolyte levels within acceptable range throughout shift  Outcome: Progressing  Goal: Maintain glucose levels >70mg/dl to <250mg/dl throughout shift  Outcome: Progressing  Goal: No changes in neurological exam by end of shift  Outcome: Progressing  Goal: Learn about and adhere to nutrition recommendations by end of shift  Outcome: Progressing  Goal: Vital signs within normal range for age by end of shift  Outcome: Progressing  Goal: Increase self care and/or family involovement by end of shift  Outcome: Progressing  Goal: Receive DSME education by end of shift  Outcome: Progressing      Problem: Heart Failure  Goal: Improved gas exchange this shift  Outcome: Progressing  Goal: Improved urinary output this shift  Outcome: Progressing  Goal: Reduction in peripheral edema within 24 hours  Outcome: Progressing  Goal: Report improvement of dyspnea/breathlessness this shift  Outcome: Progressing  Goal: Weight from fluid excess reduced over 2-3 days, then stabilize  Outcome: Progressing  Goal: Increase self care and/or family involvement in 24 hours  Outcome: Progressing

## 2024-02-19 ENCOUNTER — PATIENT OUTREACH (OUTPATIENT)
Dept: CARE COORDINATION | Facility: CLINIC | Age: 79
End: 2024-02-19
Payer: MEDICARE

## 2024-02-19 ENCOUNTER — APPOINTMENT (OUTPATIENT)
Dept: HEMATOLOGY/ONCOLOGY | Facility: CLINIC | Age: 79
End: 2024-02-19
Payer: MEDICARE

## 2024-02-19 DIAGNOSIS — E78.5 HYPERLIPIDEMIA, UNSPECIFIED HYPERLIPIDEMIA TYPE: ICD-10-CM

## 2024-02-19 DIAGNOSIS — E11.9 TYPE 2 DIABETES MELLITUS WITHOUT COMPLICATION, WITHOUT LONG-TERM CURRENT USE OF INSULIN (MULTI): ICD-10-CM

## 2024-02-19 LAB
BACTERIA BLD CULT: NORMAL
BACTERIA BLD CULT: NORMAL
BACTERIA SPEC CULT: ABNORMAL
BACTERIA SPEC CULT: ABNORMAL
BACTERIA SPEC CULT: NORMAL
GRAM STN SPEC: ABNORMAL
GRAM STN SPEC: ABNORMAL
GRAM STN SPEC: NORMAL
GRAM STN SPEC: NORMAL

## 2024-02-19 RX ORDER — LINEZOLID 600 MG/1
600 TABLET, FILM COATED ORAL 2 TIMES DAILY
Qty: 28 TABLET | Refills: 0 | Status: SHIPPED | OUTPATIENT
Start: 2024-02-19 | End: 2024-03-05 | Stop reason: HOSPADM

## 2024-02-19 NOTE — SIGNIFICANT EVENT
After discharge patients wound culture resulted with Corynebacterium striatum group (sensitive to gentamycin and vanc) and MSSA (resistant to clinda and erythryomycin). I discussed this case with Dr Obregon from ID and while she believes this culture likely represents contamination we will err on the side of caution and stop keflex and doxycycline and start Zyvox for 14 days. I spoke to patient and her son on the phone regarding stopping doxycycline and Keflex and starting Zyvox. I have sent the Zyvox rx to [facility] per patients request.

## 2024-02-19 NOTE — PROGRESS NOTES
Discharge Facility:[facility]  Discharge Diagnosis:Sepsis  Admission Date:02/14/24  Discharge Date: 02/18/24    PCP Appointment Date:02/28/24  Specialist Appointment Date:   Hospital Encounter and Summary: Linked   See discharge assessment below for further details  Engagement  Call Start Time: 0953 (2/19/2024  9:53 AM)    Medications  Medications reviewed with patient/caregiver?: Yes (keflex 500mg doxycycline 100mg) (2/19/2024  9:53 AM)  Is the patient having any side effects they believe may be caused by any medication additions or changes?: No (2/19/2024  9:53 AM)  Does the patient have all medications ordered at discharge?: Not applicable (2/12/2024  8:52 AM)  Is the patient taking all medications as directed (includes completed medication regime)?: Yes (2/19/2024  9:53 AM)    Appointments  Does the patient have a primary care provider?: Yes (2/19/2024  9:53 AM)  Care Management Interventions: Verified appointment date/time/provider (2/19/2024  9:53 AM)    Self Management  Has home health visited the patient within 72 hours of discharge?: Yes (2/19/2024  9:53 AM)  Has all Durable Medical Equipment (DME) been delivered?: No (2/19/2024  9:53 AM)    Patient Teaching  Does the patient have access to their discharge instructions?: Yes (2/19/2024  9:53 AM)  Care Management Interventions: Reviewed instructions with patient (2/19/2024  9:53 AM)  What is the patient's perception of their health status since discharge?: Improving (2/19/2024  9:53 AM)    Wrap Up  Call End Time: 1005 (2/19/2024  9:53 AM)

## 2024-02-20 ENCOUNTER — INFUSION (OUTPATIENT)
Dept: HEMATOLOGY/ONCOLOGY | Facility: CLINIC | Age: 79
End: 2024-02-20
Payer: MEDICARE

## 2024-02-20 ENCOUNTER — HOME CARE VISIT (OUTPATIENT)
Dept: HOME HEALTH SERVICES | Facility: HOME HEALTH | Age: 79
End: 2024-02-20
Payer: MEDICARE

## 2024-02-20 ENCOUNTER — HOME HEALTH ADMISSION (OUTPATIENT)
Dept: HOME HEALTH SERVICES | Facility: HOME HEALTH | Age: 79
End: 2024-02-20
Payer: MEDICARE

## 2024-02-20 ENCOUNTER — APPOINTMENT (OUTPATIENT)
Dept: HEMATOLOGY/ONCOLOGY | Facility: CLINIC | Age: 79
End: 2024-02-20
Payer: MEDICARE

## 2024-02-20 VITALS
HEART RATE: 64 BPM | RESPIRATION RATE: 16 BRPM | TEMPERATURE: 98.1 F | SYSTOLIC BLOOD PRESSURE: 124 MMHG | DIASTOLIC BLOOD PRESSURE: 55 MMHG | OXYGEN SATURATION: 95 % | BODY MASS INDEX: 31.92 KG/M2 | HEIGHT: 61 IN

## 2024-02-20 VITALS
SYSTOLIC BLOOD PRESSURE: 120 MMHG | HEART RATE: 79 BPM | TEMPERATURE: 97.6 F | RESPIRATION RATE: 18 BRPM | DIASTOLIC BLOOD PRESSURE: 60 MMHG

## 2024-02-20 DIAGNOSIS — D53.1 OTHER MEGALOBLASTIC ANEMIA: ICD-10-CM

## 2024-02-20 DIAGNOSIS — D46.9 MYELODYSPLASTIC SYNDROME (MULTI): ICD-10-CM

## 2024-02-20 LAB
BASOPHILS # BLD AUTO: 0.07 X10*3/UL (ref 0–0.1)
BASOPHILS NFR BLD AUTO: 0.8 %
EOSINOPHIL # BLD AUTO: 0.36 X10*3/UL (ref 0–0.4)
EOSINOPHIL NFR BLD AUTO: 4.3 %
ERYTHROCYTE [DISTWIDTH] IN BLOOD BY AUTOMATED COUNT: 25.2 % (ref 11.5–14.5)
HCT VFR BLD AUTO: 26.6 % (ref 36–46)
HGB BLD-MCNC: 8.1 G/DL (ref 12–16)
IMM GRANULOCYTES # BLD AUTO: 0.04 X10*3/UL (ref 0–0.5)
IMM GRANULOCYTES NFR BLD AUTO: 0.5 % (ref 0–0.9)
LYMPHOCYTES # BLD AUTO: 2.01 X10*3/UL (ref 0.8–3)
LYMPHOCYTES NFR BLD AUTO: 24 %
MCH RBC QN AUTO: 29.3 PG (ref 26–34)
MCHC RBC AUTO-ENTMCNC: 30.5 G/DL (ref 32–36)
MCV RBC AUTO: 96 FL (ref 80–100)
MONOCYTES # BLD AUTO: 0.8 X10*3/UL (ref 0.05–0.8)
MONOCYTES NFR BLD AUTO: 9.5 %
NEUTROPHILS # BLD AUTO: 5.11 X10*3/UL (ref 1.6–5.5)
NEUTROPHILS NFR BLD AUTO: 60.9 %
NRBC BLD-RTO: ABNORMAL /100{WBCS}
PLATELET # BLD AUTO: 283 X10*3/UL (ref 150–450)
RBC # BLD AUTO: 2.76 X10*6/UL (ref 4–5.2)
RBC MORPH BLD: NORMAL
WBC # BLD AUTO: 8.4 X10*3/UL (ref 4.4–11.3)

## 2024-02-20 PROCEDURE — 1090000002 HH PPS REVENUE DEBIT

## 2024-02-20 PROCEDURE — 169592 NO-PAY CLAIM PROCEDURE

## 2024-02-20 PROCEDURE — 2500000004 HC RX 250 GENERAL PHARMACY W/ HCPCS (ALT 636 FOR OP/ED): Mod: JZ,JG | Performed by: NURSE PRACTITIONER

## 2024-02-20 PROCEDURE — 1090000001 HH PPS REVENUE CREDIT

## 2024-02-20 PROCEDURE — 85025 COMPLETE CBC W/AUTO DIFF WBC: CPT

## 2024-02-20 PROCEDURE — 36415 COLL VENOUS BLD VENIPUNCTURE: CPT

## 2024-02-20 PROCEDURE — G0299 HHS/HOSPICE OF RN EA 15 MIN: HCPCS | Mod: HHH

## 2024-02-20 PROCEDURE — 0023 HH SOC

## 2024-02-20 PROCEDURE — 96372 THER/PROPH/DIAG INJ SC/IM: CPT | Performed by: NURSE PRACTITIONER

## 2024-02-20 PROCEDURE — 96372 THER/PROPH/DIAG INJ SC/IM: CPT

## 2024-02-20 RX ORDER — EPINEPHRINE 0.3 MG/.3ML
0.3 INJECTION SUBCUTANEOUS EVERY 5 MIN PRN
Status: CANCELLED | OUTPATIENT
Start: 2024-02-26

## 2024-02-20 RX ORDER — EPINEPHRINE 0.3 MG/.3ML
0.3 INJECTION SUBCUTANEOUS EVERY 5 MIN PRN
Status: DISCONTINUED | OUTPATIENT
Start: 2024-02-20 | End: 2024-02-20 | Stop reason: HOSPADM

## 2024-02-20 RX ORDER — ALBUTEROL SULFATE 0.83 MG/ML
3 SOLUTION RESPIRATORY (INHALATION) AS NEEDED
Status: CANCELLED | OUTPATIENT
Start: 2024-02-26

## 2024-02-20 RX ORDER — DIPHENHYDRAMINE HYDROCHLORIDE 50 MG/ML
50 INJECTION INTRAMUSCULAR; INTRAVENOUS AS NEEDED
Status: CANCELLED | OUTPATIENT
Start: 2024-02-26

## 2024-02-20 RX ORDER — FAMOTIDINE 10 MG/ML
20 INJECTION INTRAVENOUS ONCE AS NEEDED
Status: CANCELLED | OUTPATIENT
Start: 2024-02-26

## 2024-02-20 RX ORDER — FAMOTIDINE 10 MG/ML
20 INJECTION INTRAVENOUS ONCE AS NEEDED
Status: DISCONTINUED | OUTPATIENT
Start: 2024-02-20 | End: 2024-02-20 | Stop reason: HOSPADM

## 2024-02-20 RX ORDER — DIPHENHYDRAMINE HYDROCHLORIDE 50 MG/ML
50 INJECTION INTRAMUSCULAR; INTRAVENOUS AS NEEDED
Status: DISCONTINUED | OUTPATIENT
Start: 2024-02-20 | End: 2024-02-20 | Stop reason: HOSPADM

## 2024-02-20 RX ORDER — ALBUTEROL SULFATE 0.83 MG/ML
3 SOLUTION RESPIRATORY (INHALATION) AS NEEDED
Status: DISCONTINUED | OUTPATIENT
Start: 2024-02-20 | End: 2024-02-20 | Stop reason: HOSPADM

## 2024-02-20 RX ADMIN — ERYTHROPOIETIN 80000 UNITS: 40000 INJECTION, SOLUTION INTRAVENOUS; SUBCUTANEOUS at 13:14

## 2024-02-20 ASSESSMENT — ENCOUNTER SYMPTOMS
SUBJECTIVE PAIN PROGRESSION: WAXING AND WANING
STOOL FREQUENCY: DAILY
FATIGUES EASILY: 1
BOWEL PATTERN NORMAL: 1
LOWEST PAIN SEVERITY IN PAST 24 HOURS: 0/10
DYSPNEA ACTIVITY LEVEL: AFTER AMBULATING MORE THAN 20 FT
APPETITE LEVEL: FAIR
PAIN SEVERITY GOAL: 0/10
PAIN LOCATION - EXACERBATING FACTORS: STANDING, MOVEMENT
CHANGE IN APPETITE: UNCHANGED
PAIN LOCATION: BACK
PAIN LOCATION - PAIN SEVERITY: 4/10
DYSPNEA ON EXERTION: 1
PAIN LOCATION - PAIN FREQUENCY: INTERMITTENT
MUSCLE WEAKNESS: 1
PAIN LOCATION - PAIN QUALITY: ACHE
SHORTNESS OF BREATH: 1
PAIN LOCATION - RELIEVING FACTORS: REST, MEDICATION
PAIN: 1
PAIN LOCATION - PAIN DURATION: VARIABLE
HIGHEST PAIN SEVERITY IN PAST 24 HOURS: 8/10
LAST BOWEL MOVEMENT: 66890

## 2024-02-20 ASSESSMENT — ACTIVITIES OF DAILY LIVING (ADL)
ENTERING_EXITING_HOME: ONE PERSON
AMBULATION ASSISTANCE: ONE PERSON
CURRENT_FUNCTION: ONE PERSON
OASIS_M1830: 03

## 2024-02-20 ASSESSMENT — PAIN SCALES - GENERAL: PAINLEVEL: 0-NO PAIN

## 2024-02-20 NOTE — PROGRESS NOTES
Patient here for weekly Retacrit tolerated well. Patient to return 02/27 for labs and treatment, Patient denies any questions at this time. Patient discharged in stable condition.

## 2024-02-21 ENCOUNTER — HOME CARE VISIT (OUTPATIENT)
Dept: HOME HEALTH SERVICES | Facility: HOME HEALTH | Age: 79
End: 2024-02-21
Payer: MEDICARE

## 2024-02-21 PROCEDURE — 1090000002 HH PPS REVENUE DEBIT

## 2024-02-21 PROCEDURE — G0152 HHCP-SERV OF OT,EA 15 MIN: HCPCS | Mod: HHH

## 2024-02-21 PROCEDURE — 1090000001 HH PPS REVENUE CREDIT

## 2024-02-21 RX ORDER — SITAGLIPTIN 100 MG/1
100 TABLET, FILM COATED ORAL DAILY
Qty: 90 TABLET | Refills: 0 | Status: SHIPPED | OUTPATIENT
Start: 2024-02-21 | End: 2024-03-05 | Stop reason: HOSPADM

## 2024-02-21 RX ORDER — PIOGLITAZONEHYDROCHLORIDE 15 MG/1
15 TABLET ORAL DAILY
Qty: 90 TABLET | Refills: 0 | Status: SHIPPED | OUTPATIENT
Start: 2024-02-21 | End: 2024-04-16 | Stop reason: SDUPTHER

## 2024-02-21 RX ORDER — PRAVASTATIN SODIUM 40 MG/1
40 TABLET ORAL NIGHTLY
Qty: 90 TABLET | Refills: 0 | Status: SHIPPED | OUTPATIENT
Start: 2024-02-21 | End: 2024-04-16 | Stop reason: SDUPTHER

## 2024-02-21 ASSESSMENT — ACTIVITIES OF DAILY LIVING (ADL)
ADLS_COMMENTS: TRAY OR TABLE WHEN SOMEONE PUTS THE FOOD WITHIN REACH. THE PATIENT MUST PUT ON AN ASSISTIVE DEVICE IF NEEDED, CUT FOOD, AND IF DESIRED USE SALT AND PEPPER, SPREAD BUTTER, ETC.    SCORE  80/100    SCORE INTERPRETATION   TOTAL DEPENDENCE     00 - 20  S
ADLS_COMMENTS: OF DRESSING AND IS UNABLE TO PARTICIPATE IN THE ACTIVITY.   2     THE PATIENT IS ABLE TO PARTICIPATE TO SOME DEGREE, BUT IS DEPENDENT IN ALL ASPECTS OF DRESSING.   5     ASSISTANCE IS NEEDED IN PUTTING ON, AND/OR REMOVING ANY CLOTHING.   8     ONLY M
ADLS_COMMENTS: ISION AND ASSISTANCE.   8    GENERALLY NO ASSISTANCE IS REQUIRED. AT TIMES SUP IS REQUIRED FOR SAFETY DUE TO MORNING STIFFNESS, SOB, ETC  10   THE PATIENT IS ABLE TO GO UP/DOWN A FLIGHT OF STAIRS SAFELY WITHOUT HELP OR SUPERVISION,USE HAND RAILS, CAN
ADLS_COMMENTS: BLADDER CONTROL   0    THE PATIENT IS DEPENDENT IN BLADDER MANAGEMENT, IS INCONTINENT, OR HAS INDWELLING CATHETER.   2    THE PATIENT IS INCONTINENT BUT IS ABLE TO ASSIST WITH THE APPLICATION OF AN INTERNAL OR EXTERNAL DEVICE.   5    THE PATIENT IS
ADLS_COMMENTS: INIMAL ASSISTANCE IS REQUIRED WITH FASTENING CLOTHING SUCH AS BUTTONS, ZIPS, BRA, SHOES, ETC.   10   THE PATIENT IS ABLE TO PUT ON, REMOVE, CORSET, BRACES, AS PRESCRIBED.     PERSONAL HYGIENE (GROOMING)   0    THE PATIENT IS UNABLE TO ATTEND TO PERSO
ADLS_COMMENTS: THE TRANSFER.   8    THE TRANSFER REQUIRES THE ASSISTANCE OF ONE OTHER PERSON. ASSISTANCE MAY BE REQUIRED IN ANY ASPECT OF THE TRANSFER.   12  THE PRESENCE OF ANOTHER PERSON IS REQUIRED EITHER AS A CONFIDENCE MEASURE, OR TO PROVIDE SUPERVISION FOR S
ADLS_COMMENTS: 5    TO PROPEL WHEELCHAIR INDEPENDENTLY, THE PATIENT MUST BE ABLE TO GO AROUND CORNERS, TURN AROUND, MANOEUVRE THE CHAIR TO A TABLE, BED, TOILET, ETC. THE PATIENT MUST BE ABLE TO PUSH A CHAIR AT LEAST 50 METRES AND NEGOTIATE KERB.       STAIR CLIMBI
ADLS_COMMENTS: E TO WALK OR USE WHEELCHAIR INDEPENDENTLY.
ADLS_COMMENTS: RIC NEEDS TO BE ADMINISTERED.   2    CAN MANIPULATE AN EATING DEVICE, USUALLY A SPOON, BUT SOMEONE MUST PROVIDE ACTIVE ASSISTANCE DURING THE MEAL.   5    ABLE TO FEED SELF WITH SUPERVISION. ASSISTANCE IS REQUIRED WITH ASSOCIATED TASKS SUCH AS PUTTING
ADLS_COMMENTS: EMENT OF CLOTHING, TRANSFERRING, OR WASHING HANDS.   8    SUP MAY BE REQUIRED FOR SAFETY WITH NORMAL TOILET. BSC MAY BE USED AT NIGHT, ASSIST FOR EMPTYING AND CLEANING.   10   THE PATIENT IS ABLE TO GET ON/OFF THE TOILET, FASTEN CLOTHING AND USE TOIL
ADLS_COMMENTS: AIR, TRANSFER BACK INTO IT SAFELY AND/OR GRASP AID AND STAND. THE PATIENT MUST BE INDEPENDENT IN ALL PHASES OF THIS ACTIVITY.     AMBULATION    0    DEPENDENT IN AMBULATION  3    CONSTANT PRESENCE OF ONE OR MORE ASSISTANT IS REQUIRED DURING AMBULATIO
ADLS_COMMENTS: WHERE PATIENTS MOVE FROM DEPENDENCY TO ASSISTED INDEPENDENCE.   **60 - 80    IF LIVING ALONE WILL PROBABLY NEED A NUMBER OF COMMUNITY SERVICES TO COPE.   MORE THAN 85     LIKELY TO BE DISCHARGED TO COMMUNITY LIVING - INDEPENDENT IN TRANSFERS AND ABL
ADLS_COMMENTS: MILK/SUGAR INTO TEA, SALT, PEPPER, SPREADING BUTTER, TURNING A PLATE OR OTHER “SET UP” ACTIVITIES.   8    INDEP WITH PREPARED TRAY, MAY NEED MEAT CUT, MILK CARTON/JAR LID OPENED. ANOTHER PERSON IS NOT REQUIRED  10   THE PATIENT CAN FEED SELF FROM A
ADLS_COMMENTS: AGEMENT.   0    DEPENDENT IN WHEELCHAIR AMBULATION.   1    PATIENT CAN PROPEL SELF SHORT DISTANCES ON FLAT SURFACE, BUT ASSISTANCE IS REQUIRED FOR ALL OTHER STEPS OF WHEELCHAIR MANAGEMENT.  3    PRESENCE OF ONE PERSON IS NECESSARY AND CONSTANT ASSIST
ADLS_COMMENTS: CHAIR/BED TRANSFERS  0    UNABLE TO PARTICIPATE IN A TRANSFER. TWO ATTENDANTS ARE REQUIRED TO TRANSFER THE PATIENT WITH OR WITHOUT A MECHANICAL DEVICE.   3    ABLE TO PARTICIPATE BUT MAXIMUM ASSISTANCE OF ONE OTHER PERSON IS REQUIRE IN ALL ASPECTS OF
ADLS_COMMENTS: POSITION, AND WITH BOWEL MOVEMENT FACILITATORY TECHNIQUES.   5   THE PATIENT CAN ASSUME APPROPRIATE POSITION, BUT CANNOT USE FACILITATORY TECHNIQUES OR CLEAN SELF WITHOUT ASSISTANCE AND HAS FREQUENT ACCIDENTS.   8    ASSISTANCE IS REQUIRED WITH INCON
ADLS_COMMENTS: N.   8    ASSISTANCE IS REQUIRED WITH REACHING AIDS AND/OR THEIR MANIPULATION. ONE PERSON IS REQUIRED TO OFFER ASSISTANCE.   12   THE PATIENT IS INDEPENDENT IN AMBULATION BUT UNABLE TO WALK 50 METRES WITHOUT HELP, OR SUPERVISION IS NEEDED FOR CONFIDE
ADLS_COMMENTS: ANCE IS REQUIRED TO MANIPULATE CHAIR TO TABLE, BED, ETC.   4    THE PATIENT CAN PROPEL SELF FOR A REASONABLE DURATION OVER REGULARLY ENCOUNTERED TERRAIN. MINIMAL ASSISTANCE MAY STILL BE REQUIRED IN “TIGHT CORNERS” OR TO NEGOTIATE A KERB 100MM HIGH.
ADLS_COMMENTS: TINENCE AIDS SUCH AS PAD, ETC. THE PATIENT MAY REQUIRE SUPERVISION WITH THE USE OF SUPPOSITORY OR ENEMA AND HAS OCCASIONAL ACCIDENTS.   10   THE PATIENT CAN CONTROL BOWELS AND HAS NO ACCIDENTS, CAN USE SUPPOSITORY, OR TAKE AN ENEMA WHEN NECESSARY.
ADLS_COMMENTS: GENERALLY DRY BY DAY, BUT NOT AT NIGHT AND NEEDS SOME ASSISTANCE WITH THE DEVICES.   8    GENERALLY DRY BY DAY AND NIGHT, MAY HAVE OCCAS ACCIDENT OR NEED MIN ASSIST WITH INTERNAL OR EXTERNAL DEVICES.   10   THE PATIENT IS ABLE TO CONTROL BLADDER DAY
ADLS_COMMENTS: THE PATIENT MAY USE A BATHTUB, A SHOWER, OR TAKE A COMPLETE SPONGE BATH. THE PATIENT MUST BE ABLE TO DO ALL THE STEPS OF WHICHEVER METHOD IS EMPLOYED WITHOUT ANOTHER PERSON BEING PRESENT.     DRESSING   0     THE PATIENT IS DEPENDENT IN ALL ASPECTS
ADLS_COMMENTS: EVERE DEPENDENCE     21 - 60  **MODERATE DEPENDENCE     61 - 90  SLIGHT DEPENDENCE      91 - 99  INDEPENDENCE        100    SCORE PREDICTION    LESS THAN 40    UNLIKELY TO GO HOME - DEPENDENT IN MOBILITY - DEPENDENT IN SELF CARE   60    PIVOTAL SCORE
ADLS_COMMENTS: UIRED WITH EITHER TRANSFER TO SHOWER/BATH OR WITH WASHING OR DRYING; INCLUDING INABILITY TO COMPLETE A TASK BECAUSE OF CONDITION OR DISEASE, ETC.   4    SUPERVISION IS REQUIRED FOR SAFETY IN ADJUSTING THE WATER TEMPERATURE, OR IN THE TRANSFER.   5
ADLS_COMMENTS: AFETY.   15  THE PATIENT CAN SAFELY APPROACH THE BED WALKING OR IN A WHEELCHAIR, LOCK BRAKES, LIFT FOOTRESTS, OR POSITION WALKING AID, MOVE SAFELY TO BED, LIE DOWN, COME TO A SITTING POSITION ON THE SIDE OF THE BED, CHANGE THE POSITION OF THE WHEELCH
ADLS_COMMENTS: NG   0    THE PATIENT IS UNABLE TO CLIMB STAIRS.   2    ASSISTANCE IS REQUIRED IN ALL ASPECTS OF CHAIR CLIMBING, INCLUDING ASSISTANCE WITH WALKING AIDS.   5    THE PATIENT IS ABLE TO ASCEND/DESCEND BUT IS UNABLE TO CARRY WALKING AIDS AND NEEDS SUPERV
ADLS_COMMENTS: LE TO CONDUCT OWN PERSONAL HYGIENE BUT REQUIRES MIN ASSIST BEFORE AND/OR AFTER THE OPERATION.   5    THE PATIENT CAN WASH HIS/HER HANDS AND FACE, COMB HAIR, CLEAN TEETH AND SHAVE. A MALE PATIENT MAY USE ANY KIND OF RAZOR BUT MUST INSERT THE BLADE, OR
ADLS_COMMENTS: NAL HYGIENE AND IS DEPENDENT IN ALL ASPECTS.   1    ASSISTANCE IS REQUIRED IN ALL STEPS OF PERSONAL HYGIENE, BUT PATIENT ABLE TO MAKE SOME CONTRIBUTION.   3    SOME ASSISTANCE IS REQUIRED IN ONE OR MORE STEPS OF PERSONAL HYGIENE.   4    PATIENT IS AB
ADLS_COMMENTS: ES, CANES, OR A WALKARETTE, AND WALK 50 METRES WITHOUT HELP OR SUPERVISION.     AMBULATION/WHEELCHAIR * (IF UNABLE TO WALK) ONLY USE THIS ITEM IF THE PATIENT IS RATED “0” FOR AMBULATION, AND THEN ONLY IF THE PATIENT HAS BEEN TRAINED IN WHEELCHAIR MAN
ADLS_COMMENTS: ET PAPER WITHOUT HELP. IF NECESSARY, THE PATIENT MAY USE A BED PAN OR COMMODE OR URINAL AT NIGHT, BUT MUST BE ABLE TO EMPTY IT AND CLEAN IT.     BOWEL CONTROL   0   THE PATIENT IS BOWEL INCONTINENT.   2   THE PATIENT NEEDS HELP TO ASSUME APPROPRIATE
ADLS_COMMENTS: NCE OR SAFETY IN HAZARDOUS SITUATIONS.   15   THE PATIENT MUST BE ABLE TO WEAR BRACES IF REQUIRED, LOCK AND UNLOCK THESE BRACES ASSUME STANDING POSITION, SIT DOWN, AND PLACE THE NECESSARY AIDS INTO POSITION FOR USE. THE PATIENT MUST BE ABLE TO CRUTCH
ADLS_COMMENTS: PLUG IN THE RAZOR WITHOUT HELP, AS WELL AS RETRIEVE IT FROM THE DRAWER OR CABINET. A FEMALE PATIENT MUST APPLY HER OWN MAKE-UP, IF USED, BUT NEED NOT BRAID OR STYLE HER HAIR.     FEEDING    0    DEPENDENT IN ALL ASPECTS AND NEEDS TO BE FED, NASOGAST
ADLS_COMMENTS: AND NIGHT, AND/OR IS INDEPENDENT WITH INTERNAL OR EXTERNAL DEVICES.     BATHING   0    TOTAL DEPENDENCE IN BATHING SELF.   1    ASSISTANCE IS REQUIRED IN ALL ASPECTS OF BATHING, BUT PATIENT IS ABLE TO MAKE SOME CONTRIBUTION.   3    ASSISTANCE IS REQ

## 2024-02-21 ASSESSMENT — ENCOUNTER SYMPTOMS
DENIES PAIN: 1
PERSON REPORTING PAIN: PATIENT

## 2024-02-21 NOTE — HOME HEALTH
"OT [place], 2w8: Patient presents s/p sepsis, R 1st toe diabetic ulcer, generalized weakness. Precautions: HTN, HLD, chronic diastolic heart failure, COPD (no baseline O2), CKD, DM-II, MDS, OA. Patient previously independent for all ADLs/IADLs with rollator, including dressing and bathing independently. Current ADL status: Mod A LBD, assist for item retrieval in the kitchen, increased dependence on rollator. Barthel Index (independence with ADLs): 80%, indicating moderate dependence. 5x STS: 34.38 seconds. Patient states, \"I never have pain, I just never can function right.\" Recommend: Toilet safety frame (patient declined rec) and shower grab bars (x2, 's tape left to indicate recommended locations of grab bars). Patient requires skilled OT services to address BUE AROM, safety during ADLs/IADLs, activity modification during LBD. Rehab potential appears good. All questions/concerns addressed. To be followed by JUANITA Wasserman."

## 2024-02-22 ENCOUNTER — HOME CARE VISIT (OUTPATIENT)
Dept: HOME HEALTH SERVICES | Facility: HOME HEALTH | Age: 79
End: 2024-02-22
Payer: MEDICARE

## 2024-02-22 VITALS — OXYGEN SATURATION: 93 % | HEART RATE: 63 BPM | RESPIRATION RATE: 18 BRPM

## 2024-02-22 PROCEDURE — 1090000002 HH PPS REVENUE DEBIT

## 2024-02-22 PROCEDURE — 1090000001 HH PPS REVENUE CREDIT

## 2024-02-22 PROCEDURE — G0158 HHC OT ASSISTANT EA 15: HCPCS | Mod: HHH

## 2024-02-22 PROCEDURE — 1090000003 HH PPS REVENUE ADJ

## 2024-02-22 ASSESSMENT — ENCOUNTER SYMPTOMS
PERSON REPORTING PAIN: PATIENT
DENIES PAIN: 1

## 2024-02-23 PROCEDURE — 1090000001 HH PPS REVENUE CREDIT

## 2024-02-23 PROCEDURE — 1090000002 HH PPS REVENUE DEBIT

## 2024-02-24 PROCEDURE — 1090000001 HH PPS REVENUE CREDIT

## 2024-02-24 PROCEDURE — 1090000002 HH PPS REVENUE DEBIT

## 2024-02-25 ENCOUNTER — APPOINTMENT (OUTPATIENT)
Dept: CARDIOLOGY | Facility: HOSPITAL | Age: 79
DRG: 674 | End: 2024-02-25
Payer: MEDICARE

## 2024-02-25 ENCOUNTER — APPOINTMENT (OUTPATIENT)
Dept: RADIOLOGY | Facility: HOSPITAL | Age: 79
DRG: 674 | End: 2024-02-25
Payer: MEDICARE

## 2024-02-25 ENCOUNTER — HOSPITAL ENCOUNTER (INPATIENT)
Facility: HOSPITAL | Age: 79
LOS: 8 days | Discharge: NURSING, REHAB OR LONG-TERM CARE | DRG: 674 | End: 2024-03-05
Attending: STUDENT IN AN ORGANIZED HEALTH CARE EDUCATION/TRAINING PROGRAM | Admitting: INTERNAL MEDICINE
Payer: MEDICARE

## 2024-02-25 DIAGNOSIS — N17.9 ACUTE KIDNEY INJURY (NONTRAUMATIC) (CMS-HCC): Primary | ICD-10-CM

## 2024-02-25 DIAGNOSIS — N18.6 STAGE 5 CHRONIC KIDNEY DISEASE ON CHRONIC DIALYSIS (MULTI): ICD-10-CM

## 2024-02-25 DIAGNOSIS — N17.8 ACUTE RENAL FAILURE WITH OTHER SPECIFIED PATHOLOGICAL LESION IN KIDNEY (CMS-HCC): ICD-10-CM

## 2024-02-25 DIAGNOSIS — R53.1 GENERALIZED WEAKNESS: ICD-10-CM

## 2024-02-25 DIAGNOSIS — E11.22 TYPE 2 DIABETES MELLITUS WITH STAGE 4 CHRONIC KIDNEY DISEASE, WITHOUT LONG-TERM CURRENT USE OF INSULIN (MULTI): Chronic | ICD-10-CM

## 2024-02-25 DIAGNOSIS — N18.4 TYPE 2 DIABETES MELLITUS WITH STAGE 4 CHRONIC KIDNEY DISEASE, WITHOUT LONG-TERM CURRENT USE OF INSULIN (MULTI): Chronic | ICD-10-CM

## 2024-02-25 DIAGNOSIS — R29.6 FREQUENT FALLS: ICD-10-CM

## 2024-02-25 DIAGNOSIS — M79.18 MYOFASCIAL PAIN SYNDROME: ICD-10-CM

## 2024-02-25 DIAGNOSIS — Z99.2 STAGE 5 CHRONIC KIDNEY DISEASE ON CHRONIC DIALYSIS (MULTI): ICD-10-CM

## 2024-02-25 LAB
AMMONIA PLAS-SCNC: 15 UMOL/L (ref 16–53)
BNP SERPL-MCNC: 501 PG/ML (ref 0–99)
CARDIAC TROPONIN I PNL SERPL HS: 12 NG/L (ref 0–13)
LACTATE SERPL-SCNC: 1.1 MMOL/L (ref 0.4–2)

## 2024-02-25 PROCEDURE — 83605 ASSAY OF LACTIC ACID: CPT | Performed by: STUDENT IN AN ORGANIZED HEALTH CARE EDUCATION/TRAINING PROGRAM

## 2024-02-25 PROCEDURE — 1090000002 HH PPS REVENUE DEBIT

## 2024-02-25 PROCEDURE — 96360 HYDRATION IV INFUSION INIT: CPT

## 2024-02-25 PROCEDURE — 73630 X-RAY EXAM OF FOOT: CPT | Mod: RT

## 2024-02-25 PROCEDURE — 99285 EMERGENCY DEPT VISIT HI MDM: CPT | Mod: 25

## 2024-02-25 PROCEDURE — 85025 COMPLETE CBC W/AUTO DIFF WBC: CPT | Performed by: STUDENT IN AN ORGANIZED HEALTH CARE EDUCATION/TRAINING PROGRAM

## 2024-02-25 PROCEDURE — 1090000001 HH PPS REVENUE CREDIT

## 2024-02-25 PROCEDURE — 82140 ASSAY OF AMMONIA: CPT | Performed by: STUDENT IN AN ORGANIZED HEALTH CARE EDUCATION/TRAINING PROGRAM

## 2024-02-25 PROCEDURE — 83735 ASSAY OF MAGNESIUM: CPT | Performed by: STUDENT IN AN ORGANIZED HEALTH CARE EDUCATION/TRAINING PROGRAM

## 2024-02-25 PROCEDURE — 84075 ASSAY ALKALINE PHOSPHATASE: CPT | Performed by: STUDENT IN AN ORGANIZED HEALTH CARE EDUCATION/TRAINING PROGRAM

## 2024-02-25 PROCEDURE — 84484 ASSAY OF TROPONIN QUANT: CPT | Performed by: STUDENT IN AN ORGANIZED HEALTH CARE EDUCATION/TRAINING PROGRAM

## 2024-02-25 PROCEDURE — 71045 X-RAY EXAM CHEST 1 VIEW: CPT

## 2024-02-25 PROCEDURE — 83880 ASSAY OF NATRIURETIC PEPTIDE: CPT | Performed by: STUDENT IN AN ORGANIZED HEALTH CARE EDUCATION/TRAINING PROGRAM

## 2024-02-25 PROCEDURE — 2500000004 HC RX 250 GENERAL PHARMACY W/ HCPCS (ALT 636 FOR OP/ED): Performed by: STUDENT IN AN ORGANIZED HEALTH CARE EDUCATION/TRAINING PROGRAM

## 2024-02-25 PROCEDURE — 84100 ASSAY OF PHOSPHORUS: CPT | Performed by: STUDENT IN AN ORGANIZED HEALTH CARE EDUCATION/TRAINING PROGRAM

## 2024-02-25 PROCEDURE — 93005 ELECTROCARDIOGRAM TRACING: CPT

## 2024-02-25 PROCEDURE — 36415 COLL VENOUS BLD VENIPUNCTURE: CPT | Performed by: STUDENT IN AN ORGANIZED HEALTH CARE EDUCATION/TRAINING PROGRAM

## 2024-02-25 PROCEDURE — 96361 HYDRATE IV INFUSION ADD-ON: CPT

## 2024-02-25 RX ADMIN — SODIUM CHLORIDE, POTASSIUM CHLORIDE, SODIUM LACTATE AND CALCIUM CHLORIDE 1000 ML: 600; 310; 30; 20 INJECTION, SOLUTION INTRAVENOUS at 23:16

## 2024-02-25 ASSESSMENT — LIFESTYLE VARIABLES
EVER HAD A DRINK FIRST THING IN THE MORNING TO STEADY YOUR NERVES TO GET RID OF A HANGOVER: NO
EVER FELT BAD OR GUILTY ABOUT YOUR DRINKING: NO
HAVE YOU EVER FELT YOU SHOULD CUT DOWN ON YOUR DRINKING: NO
HAVE PEOPLE ANNOYED YOU BY CRITICIZING YOUR DRINKING: NO

## 2024-02-25 ASSESSMENT — PAIN - FUNCTIONAL ASSESSMENT: PAIN_FUNCTIONAL_ASSESSMENT: 0-10

## 2024-02-25 ASSESSMENT — COLUMBIA-SUICIDE SEVERITY RATING SCALE - C-SSRS
1. IN THE PAST MONTH, HAVE YOU WISHED YOU WERE DEAD OR WISHED YOU COULD GO TO SLEEP AND NOT WAKE UP?: NO
6. HAVE YOU EVER DONE ANYTHING, STARTED TO DO ANYTHING, OR PREPARED TO DO ANYTHING TO END YOUR LIFE?: NO
2. HAVE YOU ACTUALLY HAD ANY THOUGHTS OF KILLING YOURSELF?: NO

## 2024-02-26 ENCOUNTER — APPOINTMENT (OUTPATIENT)
Dept: RADIOLOGY | Facility: HOSPITAL | Age: 79
DRG: 674 | End: 2024-02-26
Payer: MEDICARE

## 2024-02-26 ENCOUNTER — APPOINTMENT (OUTPATIENT)
Dept: HEMATOLOGY/ONCOLOGY | Facility: CLINIC | Age: 79
End: 2024-02-26
Payer: MEDICARE

## 2024-02-26 ENCOUNTER — HOME CARE VISIT (OUTPATIENT)
Dept: HOME HEALTH SERVICES | Facility: HOME HEALTH | Age: 79
End: 2024-02-26
Payer: MEDICARE

## 2024-02-26 PROBLEM — N17.9 ACUTE KIDNEY INJURY (NONTRAUMATIC) (CMS-HCC): Status: ACTIVE | Noted: 2024-02-26

## 2024-02-26 LAB
ABO GROUP (TYPE) IN BLOOD: NORMAL
ALBUMIN SERPL BCP-MCNC: 3.5 G/DL (ref 3.4–5)
ALBUMIN SERPL BCP-MCNC: 3.7 G/DL (ref 3.4–5)
ALP SERPL-CCNC: 49 U/L (ref 33–136)
ALT SERPL W P-5'-P-CCNC: 10 U/L (ref 7–45)
ANION GAP SERPL CALC-SCNC: 13 MMOL/L (ref 10–20)
ANION GAP SERPL CALC-SCNC: 15 MMOL/L (ref 10–20)
ANTIBODY SCREEN: NORMAL
APPEARANCE UR: CLEAR
AST SERPL W P-5'-P-CCNC: 12 U/L (ref 9–39)
BACTERIA #/AREA URNS AUTO: ABNORMAL /HPF
BASOPHILS # BLD AUTO: 0.03 X10*3/UL (ref 0–0.1)
BASOPHILS # BLD AUTO: 0.03 X10*3/UL (ref 0–0.1)
BASOPHILS NFR BLD AUTO: 0.7 %
BASOPHILS NFR BLD AUTO: 0.8 %
BILIRUB SERPL-MCNC: 0.3 MG/DL (ref 0–1.2)
BILIRUB UR STRIP.AUTO-MCNC: NEGATIVE MG/DL
BLOOD EXPIRATION DATE: NORMAL
BUN SERPL-MCNC: 107 MG/DL (ref 6–23)
BUN SERPL-MCNC: 112 MG/DL (ref 6–23)
CALCIUM SERPL-MCNC: 9.1 MG/DL (ref 8.6–10.3)
CALCIUM SERPL-MCNC: 9.1 MG/DL (ref 8.6–10.3)
CARDIAC TROPONIN I PNL SERPL HS: 13 NG/L (ref 0–13)
CHLORIDE SERPL-SCNC: 108 MMOL/L (ref 98–107)
CHLORIDE SERPL-SCNC: 112 MMOL/L (ref 98–107)
CO2 SERPL-SCNC: 16 MMOL/L (ref 21–32)
CO2 SERPL-SCNC: 17 MMOL/L (ref 21–32)
COLOR UR: YELLOW
CREAT SERPL-MCNC: 4.46 MG/DL (ref 0.5–1.05)
CREAT SERPL-MCNC: 4.67 MG/DL (ref 0.5–1.05)
DACRYOCYTES BLD QL SMEAR: NORMAL
DACRYOCYTES BLD QL SMEAR: NORMAL
DISPENSE STATUS: NORMAL
EGFRCR SERPLBLD CKD-EPI 2021: 10 ML/MIN/1.73M*2
EGFRCR SERPLBLD CKD-EPI 2021: 9 ML/MIN/1.73M*2
EOSINOPHIL # BLD AUTO: 0.22 X10*3/UL (ref 0–0.4)
EOSINOPHIL # BLD AUTO: 0.23 X10*3/UL (ref 0–0.4)
EOSINOPHIL NFR BLD AUTO: 5.1 %
EOSINOPHIL NFR BLD AUTO: 5.9 %
ERYTHROCYTE [DISTWIDTH] IN BLOOD BY AUTOMATED COUNT: 25.4 % (ref 11.5–14.5)
ERYTHROCYTE [DISTWIDTH] IN BLOOD BY AUTOMATED COUNT: 25.9 % (ref 11.5–14.5)
EST. AVERAGE GLUCOSE BLD GHB EST-MCNC: 143 MG/DL
GLUCOSE BLD MANUAL STRIP-MCNC: 141 MG/DL (ref 74–99)
GLUCOSE SERPL-MCNC: 112 MG/DL (ref 74–99)
GLUCOSE SERPL-MCNC: 202 MG/DL (ref 74–99)
GLUCOSE UR STRIP.AUTO-MCNC: NEGATIVE MG/DL
HBA1C MFR BLD: 6.6 %
HCT VFR BLD AUTO: 21.7 % (ref 36–46)
HCT VFR BLD AUTO: 22.6 % (ref 36–46)
HGB BLD-MCNC: 6.8 G/DL (ref 12–16)
HGB BLD-MCNC: 7.1 G/DL (ref 12–16)
HYPOCHROMIA BLD QL SMEAR: NORMAL
IMM GRANULOCYTES # BLD AUTO: 0.01 X10*3/UL (ref 0–0.5)
IMM GRANULOCYTES # BLD AUTO: 0.02 X10*3/UL (ref 0–0.5)
IMM GRANULOCYTES NFR BLD AUTO: 0.3 % (ref 0–0.9)
IMM GRANULOCYTES NFR BLD AUTO: 0.4 % (ref 0–0.9)
INR PPP: 1.1 (ref 0.9–1.1)
KETONES UR STRIP.AUTO-MCNC: NEGATIVE MG/DL
LEUKOCYTE ESTERASE UR QL STRIP.AUTO: NEGATIVE
LYMPHOCYTES # BLD AUTO: 1.43 X10*3/UL (ref 0.8–3)
LYMPHOCYTES # BLD AUTO: 1.53 X10*3/UL (ref 0.8–3)
LYMPHOCYTES NFR BLD AUTO: 34.2 %
LYMPHOCYTES NFR BLD AUTO: 38.2 %
MAGNESIUM SERPL-MCNC: 2.12 MG/DL (ref 1.6–2.4)
MCH RBC QN AUTO: 30.2 PG (ref 26–34)
MCH RBC QN AUTO: 30.4 PG (ref 26–34)
MCHC RBC AUTO-ENTMCNC: 31.3 G/DL (ref 32–36)
MCHC RBC AUTO-ENTMCNC: 31.4 G/DL (ref 32–36)
MCV RBC AUTO: 96 FL (ref 80–100)
MCV RBC AUTO: 97 FL (ref 80–100)
MONOCYTES # BLD AUTO: 0.31 X10*3/UL (ref 0.05–0.8)
MONOCYTES # BLD AUTO: 0.37 X10*3/UL (ref 0.05–0.8)
MONOCYTES NFR BLD AUTO: 8.3 %
MONOCYTES NFR BLD AUTO: 8.3 %
NEUTROPHILS # BLD AUTO: 1.74 X10*3/UL (ref 1.6–5.5)
NEUTROPHILS # BLD AUTO: 2.3 X10*3/UL (ref 1.6–5.5)
NEUTROPHILS NFR BLD AUTO: 46.5 %
NEUTROPHILS NFR BLD AUTO: 51.3 %
NITRITE UR QL STRIP.AUTO: NEGATIVE
NRBC BLD-RTO: 0 /100 WBCS (ref 0–0)
NRBC BLD-RTO: 0.4 /100 WBCS (ref 0–0)
OVALOCYTES BLD QL SMEAR: NORMAL
OVALOCYTES BLD QL SMEAR: NORMAL
PH UR STRIP.AUTO: 5 [PH]
PHOSPHATE SERPL-MCNC: 7.1 MG/DL (ref 2.5–4.9)
PHOSPHATE SERPL-MCNC: 7.4 MG/DL (ref 2.5–4.9)
PLATELET # BLD AUTO: 212 X10*3/UL (ref 150–450)
PLATELET # BLD AUTO: 255 X10*3/UL (ref 150–450)
POTASSIUM SERPL-SCNC: 5.2 MMOL/L (ref 3.5–5.3)
POTASSIUM SERPL-SCNC: 5.6 MMOL/L (ref 3.5–5.3)
PRODUCT BLOOD TYPE: 6200
PRODUCT CODE: NORMAL
PROT SERPL-MCNC: 6.2 G/DL (ref 6.4–8.2)
PROT UR STRIP.AUTO-MCNC: ABNORMAL MG/DL
PROTHROMBIN TIME: 12.7 SECONDS (ref 9.8–12.8)
RBC # BLD AUTO: 2.24 X10*6/UL (ref 4–5.2)
RBC # BLD AUTO: 2.35 X10*6/UL (ref 4–5.2)
RBC # UR STRIP.AUTO: NEGATIVE /UL
RBC #/AREA URNS AUTO: ABNORMAL /HPF
RBC MORPH BLD: NORMAL
RBC MORPH BLD: NORMAL
RH FACTOR (ANTIGEN D): NORMAL
SODIUM SERPL-SCNC: 134 MMOL/L (ref 136–145)
SODIUM SERPL-SCNC: 136 MMOL/L (ref 136–145)
SP GR UR STRIP.AUTO: 1.01
UNIT ABO: NORMAL
UNIT NUMBER: NORMAL
UNIT RH: NORMAL
UNIT VOLUME: 350
UROBILINOGEN UR STRIP.AUTO-MCNC: <2 MG/DL
WBC # BLD AUTO: 3.7 X10*3/UL (ref 4.4–11.3)
WBC # BLD AUTO: 4.5 X10*3/UL (ref 4.4–11.3)
WBC #/AREA URNS AUTO: ABNORMAL /HPF
XM INTEP: NORMAL

## 2024-02-26 PROCEDURE — 70450 CT HEAD/BRAIN W/O DYE: CPT

## 2024-02-26 PROCEDURE — 36415 COLL VENOUS BLD VENIPUNCTURE: CPT | Performed by: STUDENT IN AN ORGANIZED HEALTH CARE EDUCATION/TRAINING PROGRAM

## 2024-02-26 PROCEDURE — 86902 BLOOD TYPE ANTIGEN DONOR EA: CPT

## 2024-02-26 PROCEDURE — 86922 COMPATIBILITY TEST ANTIGLOB: CPT

## 2024-02-26 PROCEDURE — 85610 PROTHROMBIN TIME: CPT | Performed by: STUDENT IN AN ORGANIZED HEALTH CARE EDUCATION/TRAINING PROGRAM

## 2024-02-26 PROCEDURE — 2500000004 HC RX 250 GENERAL PHARMACY W/ HCPCS (ALT 636 FOR OP/ED): Performed by: INTERNAL MEDICINE

## 2024-02-26 PROCEDURE — 36430 TRANSFUSION BLD/BLD COMPNT: CPT

## 2024-02-26 PROCEDURE — 85025 COMPLETE CBC W/AUTO DIFF WBC: CPT | Performed by: INTERNAL MEDICINE

## 2024-02-26 PROCEDURE — 1090000002 HH PPS REVENUE DEBIT

## 2024-02-26 PROCEDURE — 81001 URINALYSIS AUTO W/SCOPE: CPT | Performed by: STUDENT IN AN ORGANIZED HEALTH CARE EDUCATION/TRAINING PROGRAM

## 2024-02-26 PROCEDURE — 84484 ASSAY OF TROPONIN QUANT: CPT | Performed by: STUDENT IN AN ORGANIZED HEALTH CARE EDUCATION/TRAINING PROGRAM

## 2024-02-26 PROCEDURE — 70450 CT HEAD/BRAIN W/O DYE: CPT | Performed by: RADIOLOGY

## 2024-02-26 PROCEDURE — 86901 BLOOD TYPING SEROLOGIC RH(D): CPT | Performed by: INTERNAL MEDICINE

## 2024-02-26 PROCEDURE — 82947 ASSAY GLUCOSE BLOOD QUANT: CPT

## 2024-02-26 PROCEDURE — 80069 RENAL FUNCTION PANEL: CPT | Performed by: INTERNAL MEDICINE

## 2024-02-26 PROCEDURE — P9040 RBC LEUKOREDUCED IRRADIATED: HCPCS

## 2024-02-26 PROCEDURE — 2500000002 HC RX 250 W HCPCS SELF ADMINISTERED DRUGS (ALT 637 FOR MEDICARE OP, ALT 636 FOR OP/ED): Performed by: INTERNAL MEDICINE

## 2024-02-26 PROCEDURE — 1090000001 HH PPS REVENUE CREDIT

## 2024-02-26 PROCEDURE — 97165 OT EVAL LOW COMPLEX 30 MIN: CPT | Mod: GO

## 2024-02-26 PROCEDURE — 1100000001 HC PRIVATE ROOM DAILY

## 2024-02-26 PROCEDURE — 73630 X-RAY EXAM OF FOOT: CPT | Mod: RIGHT SIDE | Performed by: RADIOLOGY

## 2024-02-26 PROCEDURE — 2500000005 HC RX 250 GENERAL PHARMACY W/O HCPCS: Performed by: INTERNAL MEDICINE

## 2024-02-26 PROCEDURE — 83036 HEMOGLOBIN GLYCOSYLATED A1C: CPT | Performed by: INTERNAL MEDICINE

## 2024-02-26 PROCEDURE — 99223 1ST HOSP IP/OBS HIGH 75: CPT | Performed by: INTERNAL MEDICINE

## 2024-02-26 PROCEDURE — 2500000001 HC RX 250 WO HCPCS SELF ADMINISTERED DRUGS (ALT 637 FOR MEDICARE OP): Performed by: INTERNAL MEDICINE

## 2024-02-26 PROCEDURE — 71045 X-RAY EXAM CHEST 1 VIEW: CPT | Mod: FOREIGN READ | Performed by: RADIOLOGY

## 2024-02-26 RX ORDER — PIOGLITAZONEHYDROCHLORIDE 15 MG/1
15 TABLET ORAL DAILY
Status: DISCONTINUED | OUTPATIENT
Start: 2024-02-26 | End: 2024-03-05 | Stop reason: HOSPADM

## 2024-02-26 RX ORDER — CHOLECALCIFEROL (VITAMIN D3) 25 MCG
2000 TABLET ORAL DAILY
Status: DISCONTINUED | OUTPATIENT
Start: 2024-02-26 | End: 2024-03-05 | Stop reason: HOSPADM

## 2024-02-26 RX ORDER — COLCHICINE 0.6 MG/1
0.6 TABLET ORAL DAILY PRN
Status: DISCONTINUED | OUTPATIENT
Start: 2024-02-26 | End: 2024-03-05 | Stop reason: HOSPADM

## 2024-02-26 RX ORDER — INSULIN LISPRO 100 [IU]/ML
0-5 INJECTION, SOLUTION INTRAVENOUS; SUBCUTANEOUS
Status: DISCONTINUED | OUTPATIENT
Start: 2024-02-26 | End: 2024-03-05 | Stop reason: HOSPADM

## 2024-02-26 RX ORDER — AMLODIPINE BESYLATE 5 MG/1
5 TABLET ORAL DAILY
Status: DISCONTINUED | OUTPATIENT
Start: 2024-02-26 | End: 2024-03-05 | Stop reason: HOSPADM

## 2024-02-26 RX ORDER — SEVELAMER CARBONATE 800 MG/1
800 TABLET, FILM COATED ORAL
Status: DISCONTINUED | OUTPATIENT
Start: 2024-02-26 | End: 2024-03-05 | Stop reason: HOSPADM

## 2024-02-26 RX ORDER — METOPROLOL TARTRATE 25 MG/1
25 TABLET, FILM COATED ORAL DAILY
Status: DISCONTINUED | OUTPATIENT
Start: 2024-02-26 | End: 2024-03-05 | Stop reason: HOSPADM

## 2024-02-26 RX ORDER — SODIUM CHLORIDE, SODIUM LACTATE, POTASSIUM CHLORIDE, CALCIUM CHLORIDE 600; 310; 30; 20 MG/100ML; MG/100ML; MG/100ML; MG/100ML
75 INJECTION, SOLUTION INTRAVENOUS CONTINUOUS
Status: DISCONTINUED | OUTPATIENT
Start: 2024-02-26 | End: 2024-02-26

## 2024-02-26 RX ORDER — PREGABALIN 75 MG/1
75 CAPSULE ORAL DAILY
Status: DISCONTINUED | OUTPATIENT
Start: 2024-02-26 | End: 2024-02-26

## 2024-02-26 RX ORDER — DEXTROSE MONOHYDRATE 100 MG/ML
0.3 INJECTION, SOLUTION INTRAVENOUS ONCE AS NEEDED
Status: DISCONTINUED | OUTPATIENT
Start: 2024-02-26 | End: 2024-03-05 | Stop reason: HOSPADM

## 2024-02-26 RX ORDER — MECLIZINE HCL 12.5 MG 12.5 MG/1
12.5 TABLET ORAL 2 TIMES DAILY PRN
Status: DISCONTINUED | OUTPATIENT
Start: 2024-02-26 | End: 2024-03-05 | Stop reason: HOSPADM

## 2024-02-26 RX ORDER — AMOXICILLIN 250 MG
2 CAPSULE ORAL 2 TIMES DAILY PRN
Status: DISCONTINUED | OUTPATIENT
Start: 2024-02-26 | End: 2024-03-05 | Stop reason: HOSPADM

## 2024-02-26 RX ORDER — PRAVASTATIN SODIUM 40 MG/1
40 TABLET ORAL NIGHTLY
Status: DISCONTINUED | OUTPATIENT
Start: 2024-02-26 | End: 2024-03-05 | Stop reason: HOSPADM

## 2024-02-26 RX ORDER — LINEZOLID 600 MG/1
600 TABLET, FILM COATED ORAL 2 TIMES DAILY
Status: DISCONTINUED | OUTPATIENT
Start: 2024-02-26 | End: 2024-03-04

## 2024-02-26 RX ORDER — ACETAMINOPHEN 325 MG/1
500 TABLET ORAL EVERY 8 HOURS PRN
Status: DISCONTINUED | OUTPATIENT
Start: 2024-02-26 | End: 2024-03-05 | Stop reason: HOSPADM

## 2024-02-26 RX ORDER — PREGABALIN 75 MG/1
75 CAPSULE ORAL EVERY OTHER DAY
Status: DISCONTINUED | OUTPATIENT
Start: 2024-02-27 | End: 2024-03-05 | Stop reason: HOSPADM

## 2024-02-26 RX ORDER — LANOLIN ALCOHOL/MO/W.PET/CERES
1000 CREAM (GRAM) TOPICAL DAILY
Status: DISCONTINUED | OUTPATIENT
Start: 2024-02-26 | End: 2024-03-05 | Stop reason: HOSPADM

## 2024-02-26 RX ORDER — ASPIRIN 81 MG/1
81 TABLET ORAL DAILY
Status: DISCONTINUED | OUTPATIENT
Start: 2024-02-26 | End: 2024-03-05 | Stop reason: HOSPADM

## 2024-02-26 RX ORDER — HEPARIN SODIUM 5000 [USP'U]/ML
5000 INJECTION, SOLUTION INTRAVENOUS; SUBCUTANEOUS EVERY 8 HOURS
Status: DISCONTINUED | OUTPATIENT
Start: 2024-02-26 | End: 2024-03-05 | Stop reason: HOSPADM

## 2024-02-26 RX ORDER — COLCHICINE 0.6 MG/1
0.6 TABLET ORAL DAILY PRN
COMMUNITY
End: 2024-03-19 | Stop reason: ALTCHOICE

## 2024-02-26 RX ORDER — ALLOPURINOL 100 MG/1
100 TABLET ORAL DAILY
Status: DISCONTINUED | OUTPATIENT
Start: 2024-02-26 | End: 2024-03-05 | Stop reason: HOSPADM

## 2024-02-26 RX ORDER — DEXTROSE 50 % IN WATER (D50W) INTRAVENOUS SYRINGE
25
Status: DISCONTINUED | OUTPATIENT
Start: 2024-02-26 | End: 2024-03-05 | Stop reason: HOSPADM

## 2024-02-26 RX ADMIN — SODIUM BICARBONATE 75 ML/HR: 84 INJECTION INTRAVENOUS at 21:26

## 2024-02-26 RX ADMIN — PRAVASTATIN SODIUM 40 MG: 40 TABLET ORAL at 21:22

## 2024-02-26 RX ADMIN — LINEZOLID 600 MG: 600 TABLET, FILM COATED ORAL at 21:22

## 2024-02-26 RX ADMIN — SEVELAMER CARBONATE 800 MG: 800 TABLET, FILM COATED ORAL at 18:22

## 2024-02-26 RX ADMIN — CYANOCOBALAMIN TAB 1000 MCG 1000 MCG: 1000 TAB at 12:20

## 2024-02-26 RX ADMIN — ALLOPURINOL 100 MG: 100 TABLET ORAL at 12:20

## 2024-02-26 RX ADMIN — SODIUM CHLORIDE, POTASSIUM CHLORIDE, SODIUM LACTATE AND CALCIUM CHLORIDE 75 ML/HR: 600; 310; 30; 20 INJECTION, SOLUTION INTRAVENOUS at 12:21

## 2024-02-26 RX ADMIN — Medication 2000 UNITS: at 12:20

## 2024-02-26 SDOH — SOCIAL STABILITY: SOCIAL INSECURITY: DO YOU FEEL ANYONE HAS EXPLOITED OR TAKEN ADVANTAGE OF YOU FINANCIALLY OR OF YOUR PERSONAL PROPERTY?: NO

## 2024-02-26 SDOH — SOCIAL STABILITY: SOCIAL INSECURITY: ABUSE: ADULT

## 2024-02-26 SDOH — SOCIAL STABILITY: SOCIAL INSECURITY: ARE YOU OR HAVE YOU BEEN THREATENED OR ABUSED PHYSICALLY, EMOTIONALLY, OR SEXUALLY BY ANYONE?: NO

## 2024-02-26 SDOH — SOCIAL STABILITY: SOCIAL INSECURITY: HAVE YOU HAD THOUGHTS OF HARMING ANYONE ELSE?: NO

## 2024-02-26 SDOH — SOCIAL STABILITY: SOCIAL INSECURITY: DO YOU FEEL UNSAFE GOING BACK TO THE PLACE WHERE YOU ARE LIVING?: NO

## 2024-02-26 SDOH — SOCIAL STABILITY: SOCIAL INSECURITY: HAS ANYONE EVER THREATENED TO HURT YOUR FAMILY OR YOUR PETS?: NO

## 2024-02-26 SDOH — SOCIAL STABILITY: SOCIAL INSECURITY: DOES ANYONE TRY TO KEEP YOU FROM HAVING/CONTACTING OTHER FRIENDS OR DOING THINGS OUTSIDE YOUR HOME?: NO

## 2024-02-26 SDOH — SOCIAL STABILITY: SOCIAL INSECURITY: WERE YOU ABLE TO COMPLETE ALL THE BEHAVIORAL HEALTH SCREENINGS?: YES

## 2024-02-26 SDOH — SOCIAL STABILITY: SOCIAL INSECURITY: ARE THERE ANY APPARENT SIGNS OF INJURIES/BEHAVIORS THAT COULD BE RELATED TO ABUSE/NEGLECT?: NO

## 2024-02-26 ASSESSMENT — COGNITIVE AND FUNCTIONAL STATUS - GENERAL
PERSONAL GROOMING: A LITTLE
EATING MEALS: A LITTLE
DRESSING REGULAR LOWER BODY CLOTHING: TOTAL
MOVING FROM LYING ON BACK TO SITTING ON SIDE OF FLAT BED WITH BEDRAILS: A LOT
DRESSING REGULAR UPPER BODY CLOTHING: TOTAL
WALKING IN HOSPITAL ROOM: A LOT
DRESSING REGULAR UPPER BODY CLOTHING: A LOT
MOBILITY SCORE: 10
HELP NEEDED FOR BATHING: TOTAL
TOILETING: A LOT
DAILY ACTIVITIY SCORE: 11
CLIMB 3 TO 5 STEPS WITH RAILING: TOTAL
TURNING FROM BACK TO SIDE WHILE IN FLAT BAD: A LOT
HELP NEEDED FOR BATHING: A LOT
DAILY ACTIVITIY SCORE: 13
PATIENT BASELINE BEDBOUND: YES
STANDING UP FROM CHAIR USING ARMS: A LOT
PERSONAL GROOMING: A LOT
TOILETING: TOTAL
MOVING TO AND FROM BED TO CHAIR: TOTAL
DRESSING REGULAR LOWER BODY CLOTHING: A LOT

## 2024-02-26 ASSESSMENT — ACTIVITIES OF DAILY LIVING (ADL)
TOILETING: INDEPENDENT
ADL_ASSISTANCE: INDEPENDENT
GROOMING: INDEPENDENT
ADEQUATE_TO_COMPLETE_ADL: YES
WALKS IN HOME: INDEPENDENT
BATHING: INDEPENDENT
BATHING_ASSISTANCE: MAXIMAL
LACK_OF_TRANSPORTATION: PATIENT DECLINED
DRESSING YOURSELF: INDEPENDENT
ASSISTIVE_DEVICE: WALKER
JUDGMENT_ADEQUATE_SAFELY_COMPLETE_DAILY_ACTIVITIES: YES
HEARING - LEFT EAR: FUNCTIONAL
FEEDING YOURSELF: INDEPENDENT
PATIENT'S MEMORY ADEQUATE TO SAFELY COMPLETE DAILY ACTIVITIES?: YES
HEARING - RIGHT EAR: FUNCTIONAL

## 2024-02-26 ASSESSMENT — PAIN SCALES - GENERAL
PAINLEVEL_OUTOF10: 0 - NO PAIN
PAINLEVEL_OUTOF10: 4
PAINLEVEL_OUTOF10: 5 - MODERATE PAIN
PAINLEVEL_OUTOF10: 0 - NO PAIN
PAINLEVEL_OUTOF10: 5 - MODERATE PAIN

## 2024-02-26 ASSESSMENT — LIFESTYLE VARIABLES
SUBSTANCE_ABUSE_PAST_12_MONTHS: NO
HOW MANY STANDARD DRINKS CONTAINING ALCOHOL DO YOU HAVE ON A TYPICAL DAY: PATIENT DOES NOT DRINK
PRESCIPTION_ABUSE_PAST_12_MONTHS: NO
HOW OFTEN DO YOU HAVE A DRINK CONTAINING ALCOHOL: NEVER
AUDIT-C TOTAL SCORE: 0
SKIP TO QUESTIONS 9-10: 1
HOW OFTEN DO YOU HAVE 6 OR MORE DRINKS ON ONE OCCASION: NEVER
AUDIT-C TOTAL SCORE: 0

## 2024-02-26 ASSESSMENT — PAIN - FUNCTIONAL ASSESSMENT
PAIN_FUNCTIONAL_ASSESSMENT: 0-10
PAIN_FUNCTIONAL_ASSESSMENT: 0-10

## 2024-02-26 NOTE — CARE PLAN
The patient's goals for the shift include      The clinical goals for the shift include        Problem: Skin  Goal: Participates in plan/prevention/treatment measures  Outcome: Progressing  Flowsheets (Taken 2/26/2024 1140)  Participates in plan/prevention/treatment measures: Elevate heels  Goal: Promote/optimize nutrition  Outcome: Progressing  Flowsheets (Taken 2/26/2024 1140)  Promote/optimize nutrition:   Monitor/record intake including meals   Consume > 50% meals/supplements  Goal: Promote skin healing  Outcome: Progressing  Flowsheets (Taken 2/26/2024 1140)  Promote skin healing: Turn/reposition every 2 hours/use positioning/transfer devices     Problem: Fall/Injury  Goal: Not fall by end of shift  Outcome: Progressing  Goal: Be free from injury by end of the shift  Outcome: Progressing  Goal: Verbalize understanding of personal risk factors for fall in the hospital  Outcome: Progressing  Goal: Verbalize understanding of risk factor reduction measures to prevent injury from fall in the home  Outcome: Progressing  Goal: Use assistive devices by end of the shift  Outcome: Progressing  Goal: Pace activities to prevent fatigue by end of the shift  Outcome: Progressing     Problem: Pain  Goal: Takes deep breaths with improved pain control throughout the shift  Outcome: Progressing  Goal: Turns in bed with improved pain control throughout the shift  Outcome: Progressing  Goal: Walks with improved pain control throughout the shift  Outcome: Progressing  Goal: Performs ADL's with improved pain control throughout shift  Outcome: Progressing  Goal: Participates in PT with improved pain control throughout the shift  Outcome: Progressing  Goal: Free from opioid side effects throughout the shift  Outcome: Progressing  Goal: Free from acute confusion related to pain meds throughout the shift  Outcome: Progressing

## 2024-02-26 NOTE — CONSULTS
Nephrology Consult Note                                                                                                                                         Inpatient consult to [facility]  Consult performed by: Nicky Adkins  Consult ordered by: Naga Carr MD                                                                                                               HPI  Patient is a 78 y.o. female who is admitted to hospital with complaints of weakness. Nephrology consulted in view of ESTEE on CKD stage IV.    Patient has a past medical history significant for diabetic nephropathy, mild dysplastic syndrome with chronic anemia followed by hematology requiring Procrit and intermittent PRBC transfusions, chronic diastolic heart failure, COPD. She was admitted twice within the past month with similar complaints of weakness. Her past admission from February 14-18 was for increased weakness and diabetic foot infection. She was sent home on oral antibiotics and continues on Linezolid.     On this admission she presents with weakness and inability to ambulate. Patient lives alone at home and does not have anyone to cook for her. She states she has not been eating well for the past few days. She has felt fatigued and weak.   She has an ESTEE with a creatinine of 4.67 on admission (Creatinine on discharge on 2/18 was 3.07) and a BUN of 112.   She was given 1L of fluids in the ED and diuretics were held.  She has been taking her home meds including home Lasix.  She denies shortness of breath and has minimal leg swelling.  Patient experiencing some myoclonic jerks and asterixis.  Chest x-ray was negative.  CT head was negative.    Past Medical History:   Diagnosis Date    Abnormal levels of other serum enzymes     Elevated liver enzymes    Acute kidney failure (CMS/HCC)     Anemia      CKD (chronic kidney disease)     COPD (chronic obstructive pulmonary disease) (CMS/[facility])     Disease of blood and blood-forming organs, unspecified     Bone marrow disorder    HLD (hyperlipidemia)     MDS (myelodysplastic syndrome) (CMS/[facility])     Personal history of other diseases of the musculoskeletal system and connective tissue     History of muscle pain    Personal history of other specified conditions     History of insomnia    Personal history of other specified conditions     History of edema    Type 2 diabetes mellitus (CMS/[facility])       Social History     Socioeconomic History    Marital status:      Spouse name: Not on file    Number of children: Not on file    Years of education: Not on file    Highest education level: Not on file   Occupational History    Not on file   Tobacco Use    Smoking status: Never    Smokeless tobacco: Never   Vaping Use    Vaping Use: Never used   Substance and Sexual Activity    Alcohol use: Not Currently    Drug use: Never    Sexual activity: Not on file   Other Topics Concern    Not on file   Social History Narrative    Not on file     Social Determinants of Health     Financial Resource Strain: Patient Declined (2/26/2024)    Overall Financial Resource Strain (CARDIA)     Difficulty of Paying Living Expenses: Patient declined   Food Insecurity: No Food Insecurity (10/10/2023)    Hunger Vital Sign     Worried About Running Out of Food in the Last Year: Never true     Ran Out of Food in the Last Year: Never true   Transportation Needs: Patient Declined (2/26/2024)    PRAPARE - Transportation     Lack of Transportation (Medical): Patient declined     Lack of Transportation (Non-Medical): Patient declined   Physical Activity: Not on file   Stress: Not on file   Social Connections: Feeling Somewhat Isolated (2/20/2024)    OASIS : Social Isolation     Frequency of experiencing loneliness or isolation: Sometimes   Intimate Partner Violence: Not on file   Housing Stability:  Patient Declined (2/26/2024)    Housing Stability Vital Sign     Unable to Pay for Housing in the Last Year: Patient declined     Number of Places Lived in the Last Year: 1     Unstable Housing in the Last Year: Patient declined      No family history on file.   Current Facility-Administered Medications on File Prior to Encounter   Medication Dose Route Frequency Provider Last Rate Last Admin    albuterol 2.5 mg /3 mL (0.083 %) nebulizer solution 3 mL  3 mL nebulization PRN Rosanne M Casal, APRN-CNP, DNP        dextrose 5 % in water (D5W) bolus  500 mL intravenous PRN Rosanne M Casal, APRN-CNP, DNP        diphenhydrAMINE (BENADryl) injection 50 mg  50 mg intravenous PRN Rosanne M Casal, APRN-CNP, DNP        EPINEPHrine (Epipen) injection syringe 0.3 mg  0.3 mg intramuscular q5 min PRN Rosanne M Casal, APRN-CNP, DNP        famotidine PF (Pepcid) injection 20 mg  20 mg intravenous Once PRN Rosanne M Casal, APRN-CNP, DNP        methylPREDNISolone sod suc / (SOLU-Medrol) 40 mg injection 40 mg  40 mg intravenous PRN Rosanne M Casal, APRN-CNP, DNP        sodium chloride 0.9 % bolus 500 mL  500 mL intravenous PRN Rosanne M Casal, APRN-CNP, DNP         Current Outpatient Medications on File Prior to Encounter   Medication Sig Dispense Refill    acetaminophen (Tylenol) 500 mg tablet Take 1 tablet (500 mg) by mouth every 8 hours if needed.      allopurinol (Zyloprim) 100 mg tablet Take 1 tablet (100 mg) by mouth once daily. 90 tablet 1    amLODIPine (Norvasc) 5 mg tablet Take 1 tablet (5 mg) by mouth once daily.      aspirin 81 mg EC tablet Take 1 tablet (81 mg) by mouth once daily.      cholecalciferol (Vitamin D-3) 50 MCG (2000 UT) tablet Take 1 tablet (2,000 Units) by mouth once daily.      colchicine 0.6 mg tablet Take 1 tablet (0.6 mg) by mouth once daily as needed (FOR GOUT FLARES).      cyanocobalamin (Vitamin B-12) 1,000 mcg tablet Take 1 tablet (1,000 mcg) by mouth once daily.      epoetin alejandra (Procrit) 10,000  unit/mL injection Pt gets every Monday.      furosemide (Lasix) 20 mg tablet TAKE 1 TABLET BY MOUTH ONCE DAILY 7 tablet 0    linezolid (Zyvox) 600 mg tablet Take 1 tablet (600 mg) by mouth 2 times a day for 14 days. 28 tablet 0    meclizine (Antivert) 12.5 mg tablet Take 1 tablet (12.5 mg) by mouth 2 times a day as needed for dizziness. 15 tablet 0    metoprolol tartrate (Lopressor) 25 mg tablet Take 1 tablet (25 mg) by mouth once daily. 30 tablet 11    pioglitazone (Actos) 15 mg tablet TAKE 1 TABLET BY MOUTH ONCE DAILY 90 tablet 0    pravastatin (Pravachol) 40 mg tablet TAKE 1 TABLET BY MOUTH ONCE DAILY AT BEDTIME 90 tablet 0    pregabalin (Lyrica) 100 mg capsule TAKE ONE CAPSULE BY MOUTH TWICE DAILY @9AM & 5PM 60 capsule 0    SITagliptin phosphate (Januvia) 100 mg tablet TAKE 1 TABLET BY MOUTH ONCE DAILY 90 tablet 0    [DISCONTINUED] amLODIPine (Norvasc) 5 mg tablet Take 5 mg by mouth once daily. Indications: high blood pressure      [DISCONTINUED] fluticasone (Flonase) 50 mcg/actuation nasal spray Administer 1 spray into each nostril if needed.      [DISCONTINUED] pioglitazone (Actos) 15 mg tablet Take 1 tablet (15 mg) by mouth once daily. 90 tablet 0    [DISCONTINUED] pravastatin (Pravachol) 40 mg tablet Take 1 tablet (40 mg) by mouth once daily at bedtime. 90 tablet 0    [DISCONTINUED] SITagliptin phosphate (Januvia) 100 mg tablet Take 1 tablet (100 mg) by mouth once daily. 90 tablet 0      Scheduled medications  allopurinol, 100 mg, oral, Daily  [Held by provider] amLODIPine, 5 mg, oral, Daily  aspirin, 81 mg, oral, Daily  cholecalciferol, 2,000 Units, oral, Daily  cyanocobalamin, 1,000 mcg, oral, Daily  heparin (porcine), 5,000 Units, subcutaneous, q8h  insulin lispro, 0-5 Units, subcutaneous, TID with meals  linezolid, 600 mg, oral, BID  [Held by provider] metoprolol tartrate, 25 mg, oral, Daily  pioglitazone, 15 mg, oral, Daily  pravastatin, 40 mg, oral, Nightly  pregabalin, 75 mg, oral, Daily  SITagliptin  "phosphate, 25 mg, oral, Daily      Continuous medications  lactated Ringer's, 75 mL/hr      PRN medications  PRN medications: acetaminophen, colchicine, dextrose 10 % in water (D10W), dextrose, glucagon, meclizine, sennosides-docusate sodium     Review of systems  as per HPI otherwise 10 point review systems negative    /69   Pulse 65   Temp 36.2 °C (97.2 °F)   Resp 16   Ht 1.549 m (5' 1\")   Wt 72 kg (158 lb 11.7 oz)   SpO2 93%   BMI 29.99 kg/m²     Input / Output:  24 HR:   Intake/Output Summary (Last 24 hours) at 2/26/2024 1159  Last data filed at 2/26/2024 0059  Gross per 24 hour   Intake 1000 ml   Output --   Net 1000 ml       Physical Exam   Alert and oriented x 3, NAD  EOMI  OP clear  Neck: supple, No JVD  CV: RRR without m/r/g  Lungs: CTA bilaterally  Abd: soft NT/ND +BS  Ext: minimal lower extremity edema, wound on right food covered with bandage  : no cardona  Neuro: grossly intact  Skin: no rashes    Results from last 7 days   Lab Units 02/26/24  0946 02/25/24  2319   SODIUM mmol/L 136 134*   POTASSIUM mmol/L 5.6* 5.2   CHLORIDE mmol/L 112* 108*   CO2 mmol/L 17* 16*   BUN mg/dL  --  112*   CREATININE mg/dL 4.46* 4.67*   GLUCOSE mg/dL 112* 202*   CALCIUM mg/dL 9.1 9.1        Results from last 7 days   Lab Units 02/26/24  0946 02/25/24  2319   SODIUM mmol/L 136 134*   POTASSIUM mmol/L 5.6* 5.2   CHLORIDE mmol/L 112* 108*   CO2 mmol/L 17* 16*   BUN mg/dL  --  112*   CREATININE mg/dL 4.46* 4.67*   CALCIUM mg/dL 9.1 9.1   PROTEIN TOTAL g/dL  --  6.2*   BILIRUBIN TOTAL mg/dL  --  0.3   ALK PHOS U/L  --  49   ALT U/L  --  10   AST U/L  --  12   GLUCOSE mg/dL 112* 202*      Results from last 7 days   Lab Units 02/25/24  2319   MAGNESIUM mg/dL 2.12      Results from last 7 days   Lab Units 02/26/24  0946 02/25/24  2319 02/20/24  1259   WBC AUTO x10*3/uL 3.7* 4.5 8.4   HEMOGLOBIN g/dL 6.8* 7.1* 8.1*   HEMATOCRIT % 21.7* 22.6* 26.6*   PLATELETS AUTO x10*3/uL 212 255 283        CT head wo IV contrast "   Final Result   No acute intracranial abnormality.             MACRO:   None        Signed by: Katherin Díaz 2/26/2024 2:14 AM   Dictation workstation:   [id number]      XR chest 1 view   Final Result   No acute cardiopulmonary disease.   Signed by Claudio Martínez      XR foot right 3+ views   Final Result   No acute osseous abnormality identified.  Mild multifocal age-related   osteoarthritic changes as described.   Signed by Claudio Martínez           Assessment:   Patient is 78 y.o. female who is admitted to hospital for weakness. Nephrology consulted in view of ESTEE on CKD IV.    Acute kidney injury on CKD stage IV  - Patient does have a history of requiring dialysis in the past from Dec 2021 to March 2022  - Patient's creatinine has been worsening over past month, it was 3.07 on 2/18 upon previous discharge. Prior to that her baseline was around 2.6.  - Creatinine upon admission is 4.67,   - Fatigue and weakness may be secondary to uremia, patient experiencing some myoclonic jerks and asterixis as well  - UA with no evidence of infection, 1+ protein which is not new  - No recent hypotension or nephrotoxins  - Received 1L of IV fluids in the ED    Acute on chronic anemia  - Secondary to CKD and MDS  - Hb 7.1 --> 6.8  - On high-dose Procrit weekly  - Receives intermittent transfusions as needed    Hyperkalemia  - mild  - no EKG changes    Hyperphosphatemia  - phosphate binder    Metabolic acidosis  - bicarb 16  - add bicarb to fluids    Volume status  - appears euvolemic  - minimal lower extremity swelling  - no shortness of breath, chest x-ray clear      Recommendations:   - Continue to hold diuretics  - Avoid nephrotoxic medications and contrast  - Monitor I&O's  - BMP in a.m.  - Blood transfusion per primary team  - Hold Lyrica due to patient having myoclonic jerks/asterixis  - Will add bicarb to fluids  - No emergent need for dialysis but patient may require dialysis if kidney function does not  improve.    Please message me through EPIC chat with any questions or concerns.     JORDAN Pascal IV  2/26/2024  11:59 AM         [facility]    [address], [address]   [place] [address]  Office: [phone]

## 2024-02-26 NOTE — H&P
History Of Present Illness  Tana Hargrove is a 78 y.o. female with PMHx of HTN, HLD, chronic diastolic heart failure, COPD (no baseline O2), CKD, NIDDM-II, MDS with chronic anemia requiring Procrit and intermittent PRBC transfusions, OA, GERD, who presents to the emergency room with generalized fatigue and weakness.  Patient was recently discharged about 1 week prior to presentation after treatment for urinary tract infection.  She has been feeling much better at home up until the day prior to presentation when she suddenly fell.  She describes it as slumping down as she was attempting to go to the bathroom while using the walker.  This was a second episode on that day.  She denies any chest pain abdominal pain fever chills nausea or vomiting she denies any diarrhea prior to this she has been taking her medications regularly.  She admits to eating well but not drinking enough because she was concerned about overloading herself with fluid as she was told by a primary doctor.      On admission in the emergency room she was hemodynamically stable with a blood pressure 118/54 mmHg, heart rate of 66/min, respiratory rate of 18/min, temperature 36.5 °C, and was saturating 95% on room air.  Initial BMP showed sodium of 134 with a chloride of 108, bicarbonate of 16, BUN/creatinine of 113/4.67.  CBC showed a hemoglobin of 7.1.  His CT of the head was done which showed no acute intracranial abnormality.  Chest x-ray showed no acute cardiopulmonary disease and a right foot x-ray showed no acute osseous abnormality as well with mild multifocal age-related osteoarthritis changes.  Urine analysis showed no signs of UTI.  Patient has been admitted for further evaluation and management    Past Medical History  She has a past medical history of Abnormal levels of other serum enzymes, Acute kidney failure (CMS/HCC), Anemia, CKD (chronic kidney disease), COPD (chronic obstructive pulmonary disease) (CMS/HCC), Disease of blood and  blood-forming organs, unspecified, HLD (hyperlipidemia), MDS (myelodysplastic syndrome) (CMS/HCC), Personal history of other diseases of the musculoskeletal system and connective tissue, Personal history of other specified conditions, Personal history of other specified conditions, and Type 2 diabetes mellitus (CMS/[facility]).    Surgical History  She has a past surgical history that includes Other surgical history (12/13/2019); Other surgical history (12/13/2019); Other surgical history (Bilateral, 12/13/2019); Other surgical history (Left, 12/13/2019); Other surgical history (12/13/2019); Other surgical history (12/13/2019); Other surgical history (Right, 12/13/2019); Other surgical history (04/16/2021); MR angio head wo IV contrast (11/20/2020); MR angio neck wo IV contrast (11/20/2020); Breast biopsy (Left); Hysterectomy; Breast lumpectomy; Appendectomy; Colonoscopy; and Oophorectomy.     Social History  She reports that she has never smoked. She has never used smokeless tobacco. She reports that she does not currently use alcohol. She reports that she does not use drugs.    Family History  No family history on file.     Allergies  Codeine, Hydrocodone, Hydrocodone-acetaminophen, Meperidine (pf), Tramadol, Piperacillin-tazobactam, Adhesive tape-silicones, and Meperidine    Review of Systems   Constitutional:  Positive for activity change, appetite change and fatigue. Negative for chills, diaphoresis and fever.   HENT:  Negative for congestion, postnasal drip, rhinorrhea, sinus pressure, sinus pain, sneezing and sore throat.    Respiratory:  Negative for shortness of breath, cough, chest tightness and wheezing.    Cardiovascular:  Negative for chest pain, palpitations and leg swelling.   Gastrointestinal:Negative for constipation, diarrhea, nausea and vomiting, abdominal distention, abdominal pain, blood in stool and rectal pain.   Genitourinary:  Negative for decreased urine volume, difficulty urinating, dysuria,  flank pain, frequency and urgency.   Musculoskeletal:  Positive for gait problem, joint swelling and myalgias.   Skin:  Positive for color change and wound.   Neurological:  Positive for weakness. Negative for dizziness, tremors, seizures, syncope, facial asymmetry, speech difficulty, light-headedness, numbness and headaches.   Psychiatric/Behavioral:  Negative for confusion and decreased concentration. The patient is not nervous/anxious.    All other systems reviewed and are negative.  Physical Exam   Constitutional:       General: She is not in acute distress.     Appearance: Normal appearance. She is not ill-appearing or diaphoretic.   HENT:      Head: Normocephalic and atraumatic.      Mouth/Throat:      Mouth: Mucous membranes are moist.      Pharynx: Oropharynx is clear.   Eyes:      Extraocular Movements: Extraocular movements intact.      Conjunctiva/sclera: Conjunctivae normal.      Pupils: Pupils are equal, round, and reactive to light.   Neck:      Vascular: No carotid bruit.   Cardiovascular:      Rate and Rhythm: Normal rate and regular rhythm.      Pulses: Normal pulses.      Heart sounds: Murmur heard.      Comments: Faint ejection murmur heard best at the left lower sternal border  Pulmonary:      Comments: Diminished breath sounds are all lung fields  Abdominal:      General: Abdomen is flat. Bowel sounds are normal. There is no distension.      Palpations: Abdomen is soft. There is no mass.      Tenderness: There is no abdominal tenderness. There is no guarding or rebound.   Musculoskeletal:      Cervical back: Normal range of motion and neck supple. No rigidity or tenderness.      Right lower leg: Edema present.      Left lower leg: Edema present.      Comments: Trace pitting edema of bilateral lower extremities Skin:     General: Skin is warm and dry.      Capillary Refill: Capillary refill takes less than 2 seconds.      Findings:  No bruising or rash.      Comments:   Neurological:      Mental  Status: She is alert and oriented to person, place, and time.      Cranial Nerves: No cranial nerve deficit.      Sensory: Sensory deficit present.      Motor: No weakness.      Coordination: Coordination normal.      Gait: Gait normal.      Comments: AOx3,    Psychiatric:         Mood and Affect: Mood normal.         Behavior: Behavior normal.         Thought Content: Thought content normal.         Judgment: Judgment normal.   Last Recorded Vitals  /52 (BP Location: Left arm, Patient Position: Sitting)   Pulse 64   Temp 36.5 °C (97.7 °F) (Temporal)   Resp 16   Wt 72 kg (158 lb 11.7 oz)   SpO2 98%     Relevant Results             Assessment/Plan   Principal Problem:    Acute kidney injury (nontraumatic) (CMS/[facility])      #ESTEE on CKD IV  -Likely related to dehydration from overdiuresis  -Baseline sCr has been variable since the beginning of the year, ~2.6-3.4  -On admission creatinine 4.67  -Will hold diuresis with Lasix for now  -IV fluid bolus given in the emergency room I will be able to be more  -Trend BMP daily  -Nephrologist consulted    #Acute on chronic anemia  Likely related to MDS and anemia of chronic disease from worsening renal failure  Will transfuse with 1 unit of packed red blood cell  Continue Procrit per nephrology  Trend CBC daily      #Chronic diastolic heart failure   -Resume goal-directed medical therapy  -Hold diuretics for now  -      #HTN,   -Resume antihypertensives with parameters    Hyperlipidemia  Resume statins     #COPD without acute exacerbation   -Continue home therapies      #NIDDM-II   -Accucheks ACHS  -Insulin sliding scale  -Hemoglobin A1c in a.m.-       #Generalized weakness, acute on chronic deconditioning   -Likely 2/2 above   -Continue management  -PT/OT/CM consults appreciated     #DVT prophylaxis  Subcutaneous heparin      Spent 75 minutes in the initial admission of this patient    Naga Carr MD

## 2024-02-26 NOTE — PROGRESS NOTES
Tana Hargrove is a 78 y.o. female admitted for Acute kidney injury (nontraumatic) (CMS/[facility]). Pharmacy reviewed the patient's [id number] medications and allergies for accuracy.    The list below reflects the PTA list prior to pharmacy medication history. A summary a changes to the PTA medication list has been listed below. Please review each medication in order reconciliation for additional clarification and justification.    Source of information:    Medications added:   COLCHICINE 0.6 MG    Medications modified:    Medications to be removed:   FUROSEMIDE 20 MG  FLUTICASONE NASAL  AMLODIPINE 5 MG---DUPLICATE    Medications of concern:      Prior to Admission Medications   Prescriptions Last Dose Informant Patient Reported? Taking?   SITagliptin phosphate (Januvia) 100 mg tablet   No    Sig: TAKE 1 TABLET BY MOUTH ONCE DAILY   acetaminophen (Tylenol) 500 mg tablet   Yes    Sig: Take by mouth every 8 hours if needed.   allopurinol (Zyloprim) 100 mg tablet   No    Sig: Take 1 tablet (100 mg) by mouth once daily.   amLODIPine (Norvasc) 5 mg tablet   Yes    Sig: Take 1 tablet (5 mg) by mouth once daily.   amLODIPine (Norvasc) 5 mg tablet   Yes    Sig: Take 5 mg by mouth once daily. Indications: high blood pressure   aspirin 81 mg EC tablet   Yes    Sig: Take 1 tablet (81 mg) by mouth once daily.   cholecalciferol (Vitamin D-3) 50 MCG (2000 UT) tablet   Yes    Sig: Take 1 tablet (2,000 Units) by mouth once daily.   cyanocobalamin (Vitamin B-12) 1,000 mcg tablet   Yes    Sig: Take 1 tablet (1,000 mcg) by mouth once daily.   epoetin alejandra (Procrit) 10,000 unit/mL injection   Yes    Sig: Pt gets every Monday.   fluticasone (Flonase) 50 mcg/actuation nasal spray   Yes    Sig: Administer 1 spray into each nostril if needed.   furosemide (Lasix) 20 mg tablet   No    Sig: TAKE 1 TABLET BY MOUTH ONCE DAILY   linezolid (Zyvox) 600 mg tablet   No    Sig: Take 1 tablet (600 mg) by mouth 2 times a day for 14 days.   meclizine  (Antivert) 12.5 mg tablet   No    Sig: Take 1 tablet (12.5 mg) by mouth 2 times a day as needed for dizziness.   metoprolol tartrate (Lopressor) 25 mg tablet   No    Sig: Take 1 tablet (25 mg) by mouth once daily.   pioglitazone (Actos) 15 mg tablet   No    Sig: TAKE 1 TABLET BY MOUTH ONCE DAILY   pravastatin (Pravachol) 40 mg tablet   No    Sig: TAKE 1 TABLET BY MOUTH ONCE DAILY AT BEDTIME   pregabalin (Lyrica) 100 mg capsule   No    Sig: TAKE ONE CAPSULE BY MOUTH TWICE DAILY @9AM & 5PM      Facility-Administered Medications Last Administration Doses Remaining   sodium chloride 0.9% flush 10 mL None recorded           Vivian Edmondson, CPhT

## 2024-02-26 NOTE — PROGRESS NOTES
Occupational Therapy    Evaluation    Patient Name: Tana Hargrove  MRN: [id number]  Today's Date: 2/26/2024  Time Calculation  Start Time: 1441  Stop Time: 1458  Time Calculation (min): 17 min    Assessment  IP OT Assessment  OT Assessment: OT eval completed. The patient is functioning below baseline for ADLs and mobility. can benefit from continued OT  End of Session Communication: Bedside nurse  End of Session Patient Position: Bed, 3 rail up, Alarm on    Plan:  Treatment Interventions: ADL retraining, Functional transfer training, Endurance training, UE strengthening/ROM, Cognitive reorientation, Neuromuscular reeducation, Compensatory technique education  OT Frequency: 3 times per week  OT Discharge Recommendations: Moderate intensity level of continued care  OT - OK to Discharge: Yes To next level of care, with consideration of recommendations established on OT eval, and once cleared by medical team/physician for DC.     Subjective     Current Problem:  1. Acute kidney injury (nontraumatic) (CMS/HCC)        2. Generalized weakness            General:  General  Reason for Referral: ADLs, safety assessment  Referred By: Bruno  Past Medical History Relevant to Rehab: multiple recent admissions. readmitted currently with [facility]. 2 falls at home: legs gave out of her, she became shaky and fell; per pt report. other PMH:   Past Medical History:   Diagnosis Date    Abnormal levels of other serum enzymes     Elevated liver enzymes    Acute kidney failure (CMS/HCC)     Anemia     CKD (chronic kidney disease)     COPD (chronic obstructive pulmonary disease) (CMS/HCC)     Disease of blood and blood-forming organs, unspecified     Bone marrow disorder    HLD (hyperlipidemia)     MDS (myelodysplastic syndrome) (CMS/HCC)     Personal history of other diseases of the musculoskeletal system and connective tissue     History of muscle pain    Personal history of other specified conditions     History of insomnia    Personal  history of other specified conditions     History of edema    Type 2 diabetes mellitus (CMS/HCC)       Family/Caregiver Present: Yes  Caregiver Feedback: son arrived at end of session. notified RN, that son had some questions he would like to address with RN  Prior to Session Communication: Bedside nurse  Patient Position Received: Bed, 3 rail up, Alarm on  General Comment: pt alert and agreeable to therapy    Precautions:  Medical Precautions: Fall precautions        Pain:  Pain Assessment  Pain Assessment: 0-10  Pain Score: 0 - No pain    Objective     Cognition:  Overall Cognitive Status: Within Functional Limits  Arousal/Alertness: Appropriate responses to stimuli  Orientation Level:  (oriented to person, month, year. does not know name of this hospital but is aware she is in a hospital. oriented to time of day)  Following Commands: Follows all commands and directions without difficulty             Home Living:  Type of Home: Condo  Lives With: Alone  Home Adaptive Equipment: Walker rolling or standard  Home Layout: One level  Home Access: Stairs to enter with rails  Entrance Stairs-Number of Steps: 1  Bathroom Shower/Tub: Tub/shower unit (with seat)     Prior Function:  ADL Assistance: Independent  Homemaking Assistance: Independent  Ambulatory Assistance: Independent (rollator)  Prior Function Comments: pt reports no C services despite recent hospitalizations. reports family checks on her but typically does not physically assist her        ADL:  Eating Assistance: Independent (anticipated)  Grooming Assistance: Stand by (anticipated)  Bathing Assistance: Maximal (anticipated)  UE Dressing Assistance: Moderate (anticipated)  LE Dressing Assistance: Total (anticipated)  Toileting Assistance with Device: Total (anticipated)      Bed Mobility/Transfers:   Bed Mobility  Bed Mobility: Yes  Bed Mobility 1  Bed Mobility 1: Supine to sitting, Sitting to supine  Level of Assistance 1: Moderate assistance (x1)  Bed  Mobility 2  Bed Mobility  2: Scooting  Level of Assistance 2: Minimum assistance  Transfers  Transfer: Yes  Transfer 1  Transfer From 1: Sit to  Transfer to 1: Stand  Technique 1: Stand to sit  Transfer Device 1: Walker  Transfer Level of Assistance 1: Minimum assistance, Moderate verbal cues, Moderate tactile cues (x1 person)  Trials/Comments 1: stood at bedside and performed functional mobility towards HOB. pt leaning towards back of bed, decreased balance, decreased sequencing. full body tremulous        Sitting Balance:  Static Sitting Balance  Static Sitting-Balance Support: Feet supported, Bilateral upper extremity supported  Static Sitting-Level of Assistance: Contact guard  Static Sitting-Comment/Number of Minutes: body tremulous at times  Dynamic Sitting Balance  Dynamic Sitting-Balance Support: Feet supported, Bilateral upper extremity supported  Dynamic Sitting-Comments: CGa    Standing Balance:  Static Standing Balance  Static Standing-Balance Support: Bilateral upper extremity supported  Static Standing-Level of Assistance: Contact guard, Minimum assistance  Dynamic Standing Balance  Dynamic Standing-Balance Support: Bilateral upper extremity supported  Dynamic Standing-Comments: min A    Vision: Vision - Basic Assessment  Current Vision: No visual deficits    Sensation:  Sensation Comment: no deficit reported    Strength:  Strength Comments: BUE grossly 3+/5 to 4-/5      Coordination:  Movements are Fluid and Coordinated: Yes     Hand Function:  Hand Function  Gross Grasp: Functional  Coordination: Functional    Extremities: RUE   RUE : Within Functional Limits and LUE   LUE: Within Functional Limits    Outcome Measures: [facility] Daily Activity  Putting on and taking off regular lower body clothing: Total  Bathing (including washing, rinsing, drying): A lot  Putting on and taking off regular upper body clothing: A lot  Toileting, which includes using toilet, bedpan or urinal: Total  Taking care of  personal grooming such as brushing teeth: A little  Eating Meals: None  Daily Activity - Total Score: 13                    EDUCATION:     Education Documentation  ADL Training, taught by Ana Milner OT at 2/26/2024  3:21 PM.  Learner: Patient  Readiness: Acceptance  Method: Explanation  Response: Needs Reinforcement    Education Comments  No comments found.        Goals:   Encounter Problems       Encounter Problems (Active)       ADLs       Patient with complete lower body dressing with set-up and stand by assist level of assistance donning and doffing all LE clothes  with PRN adaptive equipment while supported sitting and standing (Progressing)       Start:  02/26/24    Expected End:  03/11/24               BALANCE       Pt will maintain dynamic standing balance x8 minutes during ADL task with stand by assist level of assistance in order to demonstrate decreased risk of falling and improved postural control. (Progressing)       Start:  02/26/24    Expected End:  03/11/24               TRANSFERS       Patient will complete functional transfers and functional mobility to bathroom level with least restrictive device with stand by assist level of assistance. (Progressing)       Start:  02/26/24    Expected End:  03/11/24

## 2024-02-26 NOTE — ED PROVIDER NOTES
External Records Reviewed: Discharge summary from 2/18/2024    HPI  Tana Hargrove is a 78 y.o. female with a history of hypertension, CKD, COPD, diabetes, chronic anemia who is presenting with weakness and fatigue.  Patient states she had difficulty walking today due to feeling more fatigued.  She notes she just got out of the hospital a few days ago and was doing okay until today.  She denies any recent falls or injuries.  Patient states she just feels tired and weak.  She denies any chest pain, shortness of breath, cough, runny nose, fevers or chills.  She does endorse decreased urination over the past few days.  Patient notes she has been on an antibiotic for a foot wound.  Patient states she thinks her foot wound is healing well.  Patient states she has had some blurry vision over the past few days as well as a headache.  She states her vision is intermittently double as well.  Patient notes this is new over the past 3 days.  Patient notes she just feels weak and is unable to ambulate because of this.    PMH  Past Medical History:   Diagnosis Date    Abnormal levels of other serum enzymes     Elevated liver enzymes    Acute kidney failure (CMS/HCC)     Anemia     CKD (chronic kidney disease)     COPD (chronic obstructive pulmonary disease) (CMS/HCC)     Disease of blood and blood-forming organs, unspecified     Bone marrow disorder    HLD (hyperlipidemia)     MDS (myelodysplastic syndrome) (CMS/HCC)     Personal history of other diseases of the musculoskeletal system and connective tissue     History of muscle pain    Personal history of other specified conditions     History of insomnia    Personal history of other specified conditions     History of edema    Type 2 diabetes mellitus (CMS/HCC)        Meds  Current Outpatient Medications   Medication Instructions    acetaminophen (Tylenol) 500 mg tablet oral, Every 8 hours PRN    allopurinol (ZYLOPRIM) 100 mg, oral, Daily    amLODIPine (Norvasc) 5 mg tablet 1  "tablet, oral, Daily    amLODIPine (NORVASC) 5 mg, oral, Daily    aspirin 81 mg EC tablet 1 tablet, oral, Daily    cholecalciferol (Vitamin D-3) 50 MCG (2000 UT) tablet 1 tablet, oral, Daily    cyanocobalamin (Vitamin B-12) 1,000 mcg tablet 1 tablet, oral, Daily    epoetin alejandra (Procrit) 10,000 unit/mL injection Pt gets every Monday.    fluticasone (Flonase) 50 mcg/actuation nasal spray 1 spray, Each Nostril, As needed    furosemide (Lasix) 20 mg tablet TAKE 1 TABLET BY MOUTH ONCE DAILY    Januvia 100 mg, oral, Daily    linezolid (ZYVOX) 600 mg, oral, 2 times daily    meclizine (ANTIVERT) 12.5 mg, oral, 2 times daily PRN    metoprolol tartrate (LOPRESSOR) 25 mg, oral, Daily    pioglitazone (ACTOS) 15 mg, oral, Daily    pravastatin (PRAVACHOL) 40 mg, oral, Nightly    pregabalin (Lyrica) 100 mg capsule TAKE ONE CAPSULE BY MOUTH TWICE DAILY @9AM & 5PM       Allergies  Allergies   Allergen Reactions    Codeine Unknown     \"Feel like I'm floating\"    Could not speak did not feel good    lightheaded    Hydrocodone Other    Hydrocodone-Acetaminophen Unknown     \"I went into shock\" \" States she didn't know where she was after taking 1 dose.    Other reaction(s): Other (See Comments), Other: See Comments, shock   Went into shock   \"I went into shock\" \" States she didn't know where she was after taking 1 dose.    Went into shock    Meperidine (Pf) Unknown    Tramadol Other, Unknown and Itching    Piperacillin-Tazobactam Diarrhea and Headache    Adhesive Tape-Silicones Other    Meperidine Unknown and Nausea And Vomiting     Could talk did not feel well        SHx  Social History     Tobacco Use    Smoking status: Never    Smokeless tobacco: Never   Vaping Use    Vaping Use: Never used   Substance Use Topics    Alcohol use: Not Currently    Drug use: Never       ------------------------------------------------------------------------------------------------------------------------------------------    BP (!) 107/45   Pulse 68  " " Temp 36.5 °C (97.7 °F) (Temporal)   Resp 16   Ht 1.549 m (5' 1\")   Wt 72 kg (158 lb 11.7 oz)   SpO2 96%   BMI 29.99 kg/m²     Physical Exam  Constitutional:       General: She is not in acute distress.  HENT:      Head: Normocephalic and atraumatic.      Mouth/Throat:      Mouth: Mucous membranes are dry.   Eyes:      Extraocular Movements: Extraocular movements intact.      Conjunctiva/sclera: Conjunctivae normal.      Pupils: Pupils are equal, round, and reactive to light.   Cardiovascular:      Rate and Rhythm: Normal rate and regular rhythm.      Heart sounds: No murmur heard.     No gallop.   Pulmonary:      Effort: Pulmonary effort is normal. No respiratory distress.      Breath sounds: Normal breath sounds. No wheezing.   Abdominal:      General: There is no distension.      Palpations: Abdomen is soft.      Tenderness: There is no abdominal tenderness. There is no guarding.   Musculoskeletal:         General: No swelling or signs of injury. Normal range of motion.   Skin:     General: Skin is warm and dry.      Findings: Lesion (Right base of the first toe with ulceration without significant erythema or any purulence.) present. No rash.   Neurological:      General: No focal deficit present.      Mental Status: She is alert and oriented to person, place, and time. Mental status is at baseline.      Motor: Weakness (4-5 strength diffusely) present.   Psychiatric:         Mood and Affect: Mood normal.         Judgment: Judgment normal.          ------------------------------------------------------------------------------------------------------------------------------------------    Medical Decision Making: Patient is a 78-year-old female presenting with weakness.  Patient is hemodynamically stable and nontoxic-appearing on arrival.  She has generalized weakness but is otherwise neuro intact at this time.  She does have a chronic foot wound however it it does not appear acutely infected.  Will order " x-rays to rule out osteomyelitis given the chronic nature of this wound.  Patient weakness is concerning for possible infectious process.  Other considerations include anemia as patient has known anemia at baseline.  ACS is a consideration as well.  Patient will be given fluids as she does appear dry on exam.  EKG was reassuring without acute ischemic changes.  Patient lab work was notable for acute on chronic kidney injury with a creatinine of 4.67.  Patient has acute uremia as well with a urea of 112.  Patient's most recent creatinine 4 days ago was 3.07.  Troponins are negative x 2.  Patient's hemoglobin is low at 7.1 which is consistent with prior hemoglobin levels.  BNP is elevated to 501 and we will only do 1 L of fluids for rehydration as we do not want fluid overload the patient.  X-ray showed no evidence of osteomyelitis.  Chest x-ray was unremarkable and CT head showed no acute process.  Patient will require admission for her acute on chronic kidney injury with uremia and weakness with difficulty ambulating.  Findings and plan were discussed with the patient patient questions were answered.  Patient remained hemodynamically stable and was excepted for admission to the medicine service for further treatment and management.    ED Course as of 02/26/24 0458   Sun Feb 25, 2024 2220 EKG reviewed and interpreted by myself the attending: Sinus rhythm, rate 66-minute, rate 66 bpm, normal axis, QTc 437 ms, possible slight elevation in lead II no ST segment elevations or depressions.  Not concerning for STEMI. [HW]      ED Course User Index  [HW] Karina Sanchez MD         Diagnoses as of 02/26/24 0458   Acute kidney injury (nontraumatic) (CMS/[facility])   Generalized weakness       Discussed with: Admitting provider Dr. Caprice Sanchez MD  Emergency Medicine       Karina Sanchez MD  02/26/24 0458

## 2024-02-27 ENCOUNTER — APPOINTMENT (OUTPATIENT)
Dept: HEMATOLOGY/ONCOLOGY | Facility: CLINIC | Age: 79
End: 2024-02-27
Payer: MEDICARE

## 2024-02-27 LAB
ALBUMIN SERPL BCP-MCNC: 3 G/DL (ref 3.4–5)
ALP SERPL-CCNC: 38 U/L (ref 33–136)
ALT SERPL W P-5'-P-CCNC: 10 U/L (ref 7–45)
ANION GAP SERPL CALC-SCNC: 10 MMOL/L (ref 10–20)
AST SERPL W P-5'-P-CCNC: 16 U/L (ref 9–39)
BILIRUB SERPL-MCNC: 0.4 MG/DL (ref 0–1.2)
BUN SERPL-MCNC: 96 MG/DL (ref 6–23)
CALCIUM SERPL-MCNC: 8.5 MG/DL (ref 8.6–10.3)
CHLORIDE SERPL-SCNC: 112 MMOL/L (ref 98–107)
CO2 SERPL-SCNC: 19 MMOL/L (ref 21–32)
CREAT SERPL-MCNC: 4 MG/DL (ref 0.5–1.05)
EGFRCR SERPLBLD CKD-EPI 2021: 11 ML/MIN/1.73M*2
ERYTHROCYTE [DISTWIDTH] IN BLOOD BY AUTOMATED COUNT: 23.9 % (ref 11.5–14.5)
GLUCOSE BLD MANUAL STRIP-MCNC: 115 MG/DL (ref 74–99)
GLUCOSE BLD MANUAL STRIP-MCNC: 128 MG/DL (ref 74–99)
GLUCOSE BLD MANUAL STRIP-MCNC: 195 MG/DL (ref 74–99)
GLUCOSE BLD MANUAL STRIP-MCNC: 89 MG/DL (ref 74–99)
GLUCOSE SERPL-MCNC: 126 MG/DL (ref 74–99)
HCT VFR BLD AUTO: 21.4 % (ref 36–46)
HGB BLD-MCNC: 6.9 G/DL (ref 12–16)
MCH RBC QN AUTO: 30.1 PG (ref 26–34)
MCHC RBC AUTO-ENTMCNC: 32.2 G/DL (ref 32–36)
MCV RBC AUTO: 93 FL (ref 80–100)
NRBC BLD-RTO: 0 /100 WBCS (ref 0–0)
PLATELET # BLD AUTO: 197 X10*3/UL (ref 150–450)
POTASSIUM SERPL-SCNC: 5.4 MMOL/L (ref 3.5–5.3)
PROT SERPL-MCNC: 5.2 G/DL (ref 6.4–8.2)
RBC # BLD AUTO: 2.29 X10*6/UL (ref 4–5.2)
SODIUM SERPL-SCNC: 136 MMOL/L (ref 136–145)
WBC # BLD AUTO: 4.3 X10*3/UL (ref 4.4–11.3)

## 2024-02-27 PROCEDURE — 2500000001 HC RX 250 WO HCPCS SELF ADMINISTERED DRUGS (ALT 637 FOR MEDICARE OP): Performed by: INTERNAL MEDICINE

## 2024-02-27 PROCEDURE — 82610 CYSTATIN C: CPT | Performed by: INTERNAL MEDICINE

## 2024-02-27 PROCEDURE — 85027 COMPLETE CBC AUTOMATED: CPT | Performed by: INTERNAL MEDICINE

## 2024-02-27 PROCEDURE — 1100000001 HC PRIVATE ROOM DAILY

## 2024-02-27 PROCEDURE — 97162 PT EVAL MOD COMPLEX 30 MIN: CPT | Mod: GP

## 2024-02-27 PROCEDURE — 36430 TRANSFUSION BLD/BLD COMPNT: CPT

## 2024-02-27 PROCEDURE — 80053 COMPREHEN METABOLIC PANEL: CPT | Performed by: INTERNAL MEDICINE

## 2024-02-27 PROCEDURE — 87186 SC STD MICRODIL/AGAR DIL: CPT | Mod: PORLAB | Performed by: INTERNAL MEDICINE

## 2024-02-27 PROCEDURE — 1090000001 HH PPS REVENUE CREDIT

## 2024-02-27 PROCEDURE — 2500000002 HC RX 250 W HCPCS SELF ADMINISTERED DRUGS (ALT 637 FOR MEDICARE OP, ALT 636 FOR OP/ED): Performed by: INTERNAL MEDICINE

## 2024-02-27 PROCEDURE — P9040 RBC LEUKOREDUCED IRRADIATED: HCPCS

## 2024-02-27 PROCEDURE — 76937 US GUIDE VASCULAR ACCESS: CPT

## 2024-02-27 PROCEDURE — 82947 ASSAY GLUCOSE BLOOD QUANT: CPT

## 2024-02-27 PROCEDURE — 1090000002 HH PPS REVENUE DEBIT

## 2024-02-27 PROCEDURE — 99233 SBSQ HOSP IP/OBS HIGH 50: CPT | Performed by: INTERNAL MEDICINE

## 2024-02-27 PROCEDURE — 2500000004 HC RX 250 GENERAL PHARMACY W/ HCPCS (ALT 636 FOR OP/ED): Performed by: INTERNAL MEDICINE

## 2024-02-27 RX ADMIN — SEVELAMER CARBONATE 800 MG: 800 TABLET, FILM COATED ORAL at 11:35

## 2024-02-27 RX ADMIN — LINEZOLID 600 MG: 600 TABLET, FILM COATED ORAL at 08:39

## 2024-02-27 RX ADMIN — LINEZOLID 600 MG: 600 TABLET, FILM COATED ORAL at 20:50

## 2024-02-27 RX ADMIN — HEPARIN SODIUM 5000 UNITS: 5000 INJECTION INTRAVENOUS; SUBCUTANEOUS at 20:50

## 2024-02-27 RX ADMIN — SEVELAMER CARBONATE 800 MG: 800 TABLET, FILM COATED ORAL at 08:39

## 2024-02-27 RX ADMIN — INSULIN LISPRO 1 UNITS: 100 INJECTION, SOLUTION INTRAVENOUS; SUBCUTANEOUS at 11:35

## 2024-02-27 RX ADMIN — CYANOCOBALAMIN TAB 1000 MCG 1000 MCG: 1000 TAB at 08:39

## 2024-02-27 RX ADMIN — PIOGLITAZONE HYDROCHLORIDE 15 MG: 15 TABLET ORAL at 08:39

## 2024-02-27 RX ADMIN — ASPIRIN 81 MG: 81 TABLET, COATED ORAL at 08:39

## 2024-02-27 RX ADMIN — ALLOPURINOL 100 MG: 100 TABLET ORAL at 08:39

## 2024-02-27 RX ADMIN — SEVELAMER CARBONATE 800 MG: 800 TABLET, FILM COATED ORAL at 17:48

## 2024-02-27 RX ADMIN — PREGABALIN 75 MG: 75 CAPSULE ORAL at 08:39

## 2024-02-27 RX ADMIN — Medication 2000 UNITS: at 08:39

## 2024-02-27 RX ADMIN — SITAGLIPTIN 25 MG: 50 TABLET, FILM COATED ORAL at 08:39

## 2024-02-27 RX ADMIN — PRAVASTATIN SODIUM 40 MG: 40 TABLET ORAL at 20:50

## 2024-02-27 RX ADMIN — HEPARIN SODIUM 5000 UNITS: 5000 INJECTION INTRAVENOUS; SUBCUTANEOUS at 14:04

## 2024-02-27 ASSESSMENT — COGNITIVE AND FUNCTIONAL STATUS - GENERAL
TURNING FROM BACK TO SIDE WHILE IN FLAT BAD: A LOT
MOVING FROM LYING ON BACK TO SITTING ON SIDE OF FLAT BED WITH BEDRAILS: A LOT
DRESSING REGULAR LOWER BODY CLOTHING: TOTAL
STANDING UP FROM CHAIR USING ARMS: A LOT
WALKING IN HOSPITAL ROOM: TOTAL
MOVING TO AND FROM BED TO CHAIR: A LOT
TURNING FROM BACK TO SIDE WHILE IN FLAT BAD: A LOT
DRESSING REGULAR UPPER BODY CLOTHING: A LOT
MOVING TO AND FROM BED TO CHAIR: A LOT
CLIMB 3 TO 5 STEPS WITH RAILING: TOTAL
MOVING FROM LYING ON BACK TO SITTING ON SIDE OF FLAT BED WITH BEDRAILS: A LOT
MOBILITY SCORE: 10
TOILETING: TOTAL
STANDING UP FROM CHAIR USING ARMS: A LOT
WALKING IN HOSPITAL ROOM: TOTAL
DAILY ACTIVITIY SCORE: 13
MOBILITY SCORE: 10
PERSONAL GROOMING: A LITTLE
CLIMB 3 TO 5 STEPS WITH RAILING: TOTAL
HELP NEEDED FOR BATHING: A LOT

## 2024-02-27 ASSESSMENT — PAIN - FUNCTIONAL ASSESSMENT
PAIN_FUNCTIONAL_ASSESSMENT: 0-10
PAIN_FUNCTIONAL_ASSESSMENT: 0-10

## 2024-02-27 ASSESSMENT — ACTIVITIES OF DAILY LIVING (ADL): ADL_ASSISTANCE: INDEPENDENT

## 2024-02-27 ASSESSMENT — PAIN SCALES - GENERAL
PAINLEVEL_OUTOF10: 0 - NO PAIN

## 2024-02-27 NOTE — PROGRESS NOTES
Occupational Therapy                 Therapy Communication Note    Patient Name: Tana Hargrove  MRN: [id number]  Today's Date: 2/27/2024     Discipline: Occupational Therapy    Missed Visit Reason: Missed Visit Reason:  (pt. recieving blood at this time)    Missed Time: Attempt    Comment:

## 2024-02-27 NOTE — CARE PLAN
Problem: Mobility  Goal: STG - Patient will ambulate  Description: FWW 75 FT CGA  Outcome: Not Progressing  Goal: STRENGTHENING  Description: 20 REPS RROM INCREASING STRENGTH TO STABILIZE GAIT  Outcome: Not Progressing     Problem: Transfers  Goal: STG - Patient to transfer to and from sit to supine  Description: MIN A USING RAILS  Outcome: Not Progressing  Goal: STG - Patient will transfer sit to and from stand  Description: FWW CGA USING PROPER TECHNIQUE  Outcome: Not Progressing

## 2024-02-27 NOTE — PROGRESS NOTES
02/27/24 1256   Discharge Planning   Living Arrangements Alone   Support Systems Family members   Assistance Needed ambulation, ADL's, IADL's   Type of Residence Private residence   Home or Post Acute Services In home services   Type of Home Care Services Home nursing visits;Home OT;Home PT   Patient expects to be discharged to: SNF vs HHC   Does the patient need discharge transport arranged? Yes   RoundTrip coordination needed? Yes   Patient Choice   Provider Choice list and CMS website (https://medicare.gov/care-compare#search) for post-acute Quality and Resource Measure Data were provided and reviewed with: Family;Patient     Discharge planning assessment completed with patient and her son Adolph at bedside. Patient lives alone in a single story condo. She is currently active with [facility] services. Patient has had several recent admissions and was recommended for SNF in the past. Therapy is again recommending MOD intensity therapy at discharge. Patient has been to SNF in the past; son states [facility] and someplace in [place]. Patient is reluctant to readmit to SNF in favor of resuming [facility] at discharge. Adolph expressed that he feels patient would benefit from SNF at this time, but has been frustrated with the choicing process in the past, lack of bed availability, and conditions of facilities. Patient and Adolph are in agreement with family reviewing a SNF list and visiting potential facilities before finalizing their decision. Patient and family were provided with a CAREPORT list of skilled nursing facilities that are within patient's insurance network, display CMS star-ratings, and that are within patient/family's preferred geographic region. I explained to patient and Adolph that insurance contracts do change and unfortunately we cannot guarantee that every facility on that list will truly be in network until we start sending referrals. Patient's son tells me he is considering possibly appealing the patient's  discharge. I reviewed the IMM with Adolph, explained the purpose, and reviewed the appeals process with him. TCC following.

## 2024-02-27 NOTE — CONSULTS
Wound Care Consult     Visit Date: 2/27/2024      Patient Name: Tana Hargrove         MRN: [id number]           YOB: 1945     Spoke with Dr. Pyle, patient did follow up and has been since discharge. He reports he cancelled podiatry consult at this time, agrees with recommendations in place for wound care.     Please contact me with questions or changes in patient condition.  Cristina Dove RN  Wound and Ost[facility] Care   [phone]

## 2024-02-27 NOTE — PROGRESS NOTES
Tana Hargrove is a 78 y.o. female on day 1 of admission presenting with Acute kidney injury (nontraumatic) (CMS/HCC).      Subjective   Tana Hargrove is a 78 y.o. female with PMHx of HTN, HLD, chronic diastolic heart failure, COPD (no baseline O2), CKD, NIDDM-II, MDS with chronic anemia requiring Procrit and intermittent PRBC transfusions, OA, GERD, who presents to the emergency room with generalized fatigue and weakness.  Patient was recently discharged about 1 week prior to presentation after treatment for urinary tract infection.  She has been feeling much better at home up until the day prior to presentation when she suddenly fell.  She describes it as slumping down as she was attempting to go to the bathroom while using the walker.  This was a second episode on that day.  She denies any chest pain abdominal pain fever chills nausea or vomiting she denies any diarrhea prior to this she has been taking her medications regularly.  She admits to eating well but not drinking enough because she was concerned about overloading herself with fluid as she was told by a primary doctor.        On admission in the emergency room she was hemodynamically stable with a blood pressure 118/54 mmHg, heart rate of 66/min, respiratory rate of 18/min, temperature 36.5 °C, and was saturating 95% on room air.  Initial BMP showed sodium of 134 with a chloride of 108, bicarbonate of 16, BUN/creatinine of 113/4.67.  CBC showed a hemoglobin of 7.1.  His CT of the head was done which showed no acute intracranial abnormality.  Chest x-ray showed no acute cardiopulmonary disease and a right foot x-ray showed no acute osseous abnormality as well with mild multifocal age-related osteoarthritis changes.  Urine analysis showed no signs of UTI.  Patient has been admitted for further evaluation and management.  2/27: Patient was seen and examined.  Still complaining of generalized weakness.  Hemoglobin is 6.9 today after 1 units of packed red  blood cell transfusion.  Likely related to hemodilution.  I will transfuse 1 more unit of packed red blood cell today.  Creatinine is improving down to 4.0 today.  Continue IV bicarbonate and nephrologist.  Nephrology is on board and recommendations appreciated.  Seen by PT OT who recommend extended-care facility however patient is adamantly refusing SNF placement.  Repeat CBC and BMP in AM.       Objective     Last Recorded Vitals  /89 (BP Location: Left arm, Patient Position: Standing)   Pulse 82   Temp 36.4 °C (97.6 °F) (Temporal)   Resp 18   Wt 72 kg (158 lb 11.7 oz)   SpO2 97%   Intake/Output last 3 Shifts:    Intake/Output Summary (Last 24 hours) at 2/27/2024 0920  Last data filed at 2/27/2024 0843  Gross per 24 hour   Intake 2612.5 ml   Output 1400 ml   Net 1212.5 ml       Admission Weight  Weight: 72 kg (158 lb 11.7 oz) (02/25/24 2204)    Daily Weight  02/25/24 : 72 kg (158 lb 11.7 oz)    Image Results  ECG 12 lead  Sinus rhythm  CT head wo IV contrast  Narrative: Interpreted By:  Katherin Díaz,   STUDY:  CT HEAD WO IV CONTRAST;  2/26/2024 1:35 am      INDICATION:  Signs/Symptoms:double vision.      COMPARISON:  08/11/2023      ACCESSION NUMBER(S):  [id number]      ORDERING CLINICIAN:  IVAN EARL      TECHNIQUE:  Axial noncontrast CT images of the head.      FINDINGS:  BRAIN PARENCHYMA:  Gray-white matter interfaces are preserved. No  mass effect or midline shift.      HEMORRHAGE: No acute intracranial hemorrhage.  VENTRICLES and EXTRA-AXIAL SPACES: The ventricles and sulci are  within normal limits in size for brain volume. No abnormal extraaxial  fluid collection. EXTRACRANIAL SOFT TISSUES: Within normal limits.  PARANASAL SINUSES/MASTOIDS:  The visualized paranasal sinuses and  mastoid air cells are aerated. CALVARIUM: No depressed skull  fracture. No destructive osseous lesion.      OTHER FINDINGS: None.      Impression: No acute intracranial abnormality.          MACRO:  None       Signed by: Katherin Díaz 2/26/2024 2:14 AM  Dictation workstation:   [id number]  XR foot right 3+ views  Narrative: STUDY:  Foot Radiographs; 2/26/2024 00:08  INDICATION:  Foot wound, rule out osteo.  COMPARISON:  2/14/2024 XR Foot Right.  ACCESSION NUMBER(S):  [id number]  ORDERING CLINICIAN:  IVAN EARL  TECHNIQUE:  Three view(s) of the right foot.  FINDINGS:    There is no displaced fracture.  Mild hallux valgus configuration of  the great toe is noted.  There is mild joint space narrowing in the  interphalangeal joints as well as the first tarsal metatarsal joint  and first MTP joint.  Small Achilles enthesophyte and small plantar  calcaneal spur noted.  Peripheral vascular consultations are seen in  the ankle.  No soft tissue abnormality is seen.  Impression: No acute osseous abnormality identified.  Mild multifocal age-related  osteoarthritic changes as described.  Signed by Claudio Martínez  XR chest 1 view  Narrative: STUDY:  Chest Radiograph;  2/26/2024  INDICATION:  Weakness.  COMPARISON:  12/14/2024 XR Chest  ACCESSION NUMBER(S):  [id number]  ORDERING CLINICIAN:  IVAN EARL  TECHNIQUE:  Frontal chest was obtained at 00:08 hours.  FINDINGS:  CARDIOMEDIASTINAL SILHOUETTE:  Cardiomediastinal silhouette is normal in size and configuration.     LUNGS:  Lungs are clear.  Elevated right hemidiaphragm is noted.     ABDOMEN:  No remarkable upper abdominal findings.     BONES:  No acute osseous changes.  Impression: No acute cardiopulmonary disease.  Signed by Claudio Martínez      Physical Exam  Constitutional:       General: She is not in acute distress.     Appearance: Normal appearance. She is not ill-appearing or diaphoretic.   HENT:      Head: Normocephalic and atraumatic.      Mouth/Throat:      Mouth: Mucous membranes are moist.      Pharynx: Oropharynx is clear.   Eyes:      Extraocular Movements: Extraocular movements intact.      Conjunctiva/sclera: Conjunctivae normal.      Pupils: Pupils are  equal, round, and reactive to light.   Neck:      Vascular: No carotid bruit.   Cardiovascular:      Rate and Rhythm: Normal rate and regular rhythm.      Pulses: Normal pulses.      Heart sounds: Murmur heard.      Comments: Faint ejection murmur heard best at the left lower sternal border  Pulmonary:      Comments: Diminished breath sounds are all lung fields  Abdominal:      General: Abdomen is flat. Bowel sounds are normal. There is no distension.      Palpations: Abdomen is soft. There is no mass.      Tenderness: There is no abdominal tenderness. There is no guarding or rebound.   Musculoskeletal:      Cervical back: Normal range of motion and neck supple. No rigidity or tenderness.      Right lower leg: Edema present.      Left lower leg: Edema present.      Comments: Trace pitting edema of bilateral lower extremities Skin:     General: Skin is warm and dry.      Capillary Refill: Capillary refill takes less than 2 seconds.      Findings:  No bruising or rash.      Comments:   Neurological:      Mental Status: She is alert and oriented to person, place, and time.      Cranial Nerves: No cranial nerve deficit.      Sensory: Sensory deficit present.      Motor: No weakness.      Coordination: Coordination normal.      Gait: Gait normal.      Comments: AOx3,    Psychiatric:         Mood and Affect: Mood normal.         Behavior: Behavior normal.         Thought Content: Thought content normal.         Judgment: Judgment normal.   Relevant Results               Assessment/Plan                  Principal Problem:    Acute kidney injury (nontraumatic) (CMS/[facility])    #ESTEE on CKD IV  -Likely related to dehydration from overdiuresis  -Baseline sCr has been variable since the beginning of the year, ~2.6-3.4  -On admission creatinine 4.67  -Will hold diuresis with Lasix for now  - Continue IV bicard drip per nephrology  -Trend BMP daily  -Nephrologist on board     #Acute on chronic anemia  Likely related to MDS and anemia  of chronic disease from worsening renal failure  Status post 1 unit of packed red blood cell transfusion.  I will transfuse 1 more unit  Continue Procrit per nephrology  Trend CBC daily        #Chronic diastolic heart failure   -Resume goal-directed medical therapy  -Hold diuretics for now  -      #HTN,   -Resume antihypertensives with parameters     Hyperlipidemia  Resume statins     #COPD without acute exacerbation   -Continue home therapies      #NIDDM-II   -Accucheks ACHS  -Insulin sliding scale  -Hemoglobin A1c in a.m.-        #Generalized weakness, acute on chronic deconditioning   -Likely 2/2 above   -Continue management  -PT/OT/CM consults appreciated      #DVT prophylaxis  Subcutaneous heparin             Spent 35 minutes in the follow-up management of this patient today    Naga Carr MD

## 2024-02-27 NOTE — PROGRESS NOTES
Social work consult placed for positive medical risk screen. SW reviewed pt's chart and communicated with TCC. No SW needs foreseen at this time. SW signing off; available upon request.    Luc Bland, MSW, LSW ([id number])   Care Transitions

## 2024-02-27 NOTE — CARE PLAN
The clinical goals for the shift include Patient will have an improved BUN and creatitine level by the end of my shift.    Over the shift, the patient did make progress toward the following goals.     Problem: Skin  Goal: Participates in plan/prevention/treatment measures  Outcome: Progressing  Flowsheets (Taken 2/27/2024 0548)  Participates in plan/prevention/treatment measures:   Discuss with provider PT/OT consult   Elevate heels   Increase activity/out of bed for meals  Goal: Promote/optimize nutrition  Outcome: Progressing  Flowsheets (Taken 2/27/2024 0548)  Promote/optimize nutrition:   Monitor/record intake including meals   Consume > 50% meals/supplements   Offer water/supplements/favorite foods  Goal: Promote skin healing  Outcome: Progressing  Flowsheets (Taken 2/27/2024 0548)  Promote skin healing:   Assess skin/pad under line(s)/device(s)   Protective dressings over bony prominences     Problem: Fall/Injury  Goal: Not fall by end of shift  Outcome: Progressing  Goal: Be free from injury by end of the shift  Outcome: Progressing  Goal: Verbalize understanding of personal risk factors for fall in the hospital  Outcome: Progressing  Goal: Verbalize understanding of risk factor reduction measures to prevent injury from fall in the home  Outcome: Progressing  Goal: Use assistive devices by end of the shift  Outcome: Progressing  Goal: Pace activities to prevent fatigue by end of the shift  Outcome: Progressing     Problem: Pain  Goal: Takes deep breaths with improved pain control throughout the shift  Outcome: Progressing  Goal: Turns in bed with improved pain control throughout the shift  Outcome: Progressing  Goal: Walks with improved pain control throughout the shift  Outcome: Progressing  Goal: Performs ADL's with improved pain control throughout shift  Outcome: Progressing  Goal: Participates in PT with improved pain control throughout the shift  Outcome: Progressing  Goal: Free from opioid side effects  throughout the shift  Outcome: Progressing  Goal: Free from acute confusion related to pain meds throughout the shift  Outcome: Progressing     Problem: Pain - Adult  Goal: Verbalizes/displays adequate comfort level or baseline comfort level  Outcome: Progressing     Problem: Safety - Adult  Goal: Free from fall injury  Outcome: Progressing     Problem: Discharge Planning  Goal: Discharge to home or other facility with appropriate resources  Outcome: Progressing     Problem: Chronic Conditions and Co-morbidities  Goal: Patient's chronic conditions and co-morbidity symptoms are monitored and maintained or improved  Outcome: Progressing     Problem: Diabetes  Goal: Achieve decreasing blood glucose levels by end of shift  Outcome: Progressing  Goal: Increase stability of blood glucose readings by end of shift  Outcome: Progressing  Goal: Decrease in ketones present in urine by end of shift  Outcome: Progressing  Goal: Maintain electrolyte levels within acceptable range throughout shift  Outcome: Progressing  Goal: Maintain glucose levels >70mg/dl to <250mg/dl throughout shift  Outcome: Progressing  Goal: No changes in neurological exam by end of shift  Outcome: Progressing  Goal: Learn about and adhere to nutrition recommendations by end of shift  Outcome: Progressing  Goal: Vital signs within normal range for age by end of shift  Outcome: Progressing  Goal: Increase self care and/or family involovement by end of shift  Outcome: Progressing  Goal: Receive DSME education by end of shift  Outcome: Progressing

## 2024-02-27 NOTE — PROGRESS NOTES
"      Nephrology Progress Note      Nephrology following for ESTEE on CKD IV.   Events over night:   No acute events overnight.  Patient still feeling fatigued today.  Received 1 unit of PRBC's yesterday.  Creatinine is improving.      /89 (BP Location: Left arm, Patient Position: Standing)   Pulse 82   Temp 36.4 °C (97.6 °F) (Temporal)   Resp 18   Ht 1.549 m (5' 1\")   Wt 72 kg (158 lb 11.7 oz)   SpO2 97%   BMI 29.99 kg/m²     Input / Output:  24 HR:   Intake/Output Summary (Last 24 hours) at 2/27/2024 0935  Last data filed at 2/27/2024 0843  Gross per 24 hour   Intake 2612.5 ml   Output 1400 ml   Net 1212.5 ml       Physical Exam   Alert and oriented x 3 NAD  Neck: no JVD  CV: RRR  Lungs: CTA bilaterally  Abd: soft, NT, ND   Ext: minimal lower extremity edema    Scheduled medications  allopurinol, 100 mg, oral, Daily  amLODIPine, 5 mg, oral, Daily  aspirin, 81 mg, oral, Daily  cholecalciferol, 2,000 Units, oral, Daily  cyanocobalamin, 1,000 mcg, oral, Daily  heparin (porcine), 5,000 Units, subcutaneous, q8h  insulin lispro, 0-5 Units, subcutaneous, TID with meals  linezolid, 600 mg, oral, BID  metoprolol tartrate, 25 mg, oral, Daily  pioglitazone, 15 mg, oral, Daily  pravastatin, 40 mg, oral, Nightly  pregabalin, 75 mg, oral, Every other day  sevelamer carbonate, 800 mg, oral, TID with meals  SITagliptin phosphate, 25 mg, oral, Daily      Continuous medications  sodium bicarbonate, 75 mL/hr, Last Rate: 75 mL/hr (02/27/24 0843)      PRN medications  PRN medications: acetaminophen, colchicine, dextrose 10 % in water (D10W), dextrose, glucagon, meclizine, sennosides-docusate sodium   Results from last 7 days   Lab Units 02/27/24  0318   SODIUM mmol/L 136   POTASSIUM mmol/L 5.4*   CHLORIDE mmol/L 112*   CO2 mmol/L 19*   BUN mg/dL 96*   CREATININE mg/dL 4.00*   CALCIUM mg/dL 8.5*   PROTEIN TOTAL g/dL 5.2*   BILIRUBIN TOTAL mg/dL 0.4   ALK PHOS U/L 38   ALT U/L 10   AST U/L 16   GLUCOSE mg/dL 126*      Results " from last 7 days   Lab Units 02/25/24  2319   MAGNESIUM mg/dL 2.12      Results from last 7 days   Lab Units 02/27/24  0318 02/26/24  0946 02/25/24  2319   WBC AUTO x10*3/uL 4.3* 3.7* 4.5   HEMOGLOBIN g/dL 6.9* 6.8* 7.1*   HEMATOCRIT % 21.4* 21.7* 22.6*   PLATELETS AUTO x10*3/uL 197 212 255        Assessment & Plan:     Assessment:   Patient is 78 y.o. female who is admitted to hospital for weakness. Nephrology consulted in view of ESTEE on CKD IV.     Acute kidney injury on CKD stage IV  - Patient does have a history of requiring dialysis in the past from Dec 2021 to March 2022  - Patient's creatinine has been worsening over past month, it was 3.07 on 2/18 upon previous discharge. Prior to that her baseline was around 2.6.  - Creatinine upon admission is 4.67,   - Fatigue and weakness may be secondary to uremia, patient experiencing some myoclonic jerks and asterixis as well  - UA with no evidence of infection, 1+ protein which is not new  - No recent hypotension or nephrotoxins  - Received 1L of IV fluids in the ED  - Creatinine down trending     Acute on chronic anemia  - Secondary to CKD and MDS  - Hb 7.1 --> 6.8 --> 6.9 (after 1 unit PRBC's)  - On high-dose Procrit weekly  - Receives intermittent transfusions as needed     Hyperkalemia  - mild  - no EKG changes     Hyperphosphatemia  - phosphate binder     Metabolic acidosis  - bicarb 16  - add bicarb to fluids  - improving     Volume status  - appears euvolemic  - minimal lower extremity swelling  - no shortness of breath, chest x-ray clear        Recommendations:   - Continue to hold diuretics  - Avoid nephrotoxic medications and contrast  - Monitor I&O's  - BMP in a.m.  - Blood transfusions per primary team  - Switched Lyrica to every other day due to patient having myoclonic jerks/asterixis which could be related to Lyrica excess  - Continue sodium bicarbonate D5W  - Continue phosphorus binder Renvela  - No emergent need for dialysis but patient may  require dialysis if kidney function does not improve.      Please message me through [facility] chat with any questions or concerns.     JORDAN Pascal IV  2/27/2024  9:35 AM     [facility]    [address], [address]   [place] [address]  Office: [phone]

## 2024-02-27 NOTE — CONSULTS
Wound Care Consult     Visit Date: 2/27/2024      Patient Name: Tana Hargrove         MRN: [id number]           YOB: 1945     Pertinent Labs:   Albumin   Date Value Ref Range Status   02/27/2024 3.0 (L) 3.4 - 5.0 g/dL Final     ALBUMIN (MG/L) IN URINE   Date Value Ref Range Status   09/26/2022 972.8 Not Established mg/L Final     Wound Assessment:  Wound 02/14/24 Diabetic Ulcer Foot Right;Plantar (Active)   Wound Image   02/27/24 1434   Site Assessment Red;Pink;Yellow;Sloughing 02/27/24 1434   Ana-Wound Assessment Dry;Calloused 02/27/24 1434   Non-staged Wound Description Full thickness 02/27/24 1434   Shape circular 02/27/24 1434   Wound Length (cm) 1.2 cm 02/27/24 1434   Wound Width (cm) 1.5 cm 02/27/24 1434   Wound Surface Area (cm^2) 1.8 cm^2 02/27/24 1434   Wound Depth (cm) 0.4 cm 02/27/24 1434   Wound Volume (cm^3) 0.72 cm^3 02/27/24 1434   Wound Healing % -60 02/27/24 1434   Wound Bed Granulation (%) 25 % 02/27/24 1434   Wound Bed Slough (%) 75 % 02/27/24 1434   Margins Well-defined edges 02/27/24 1434   Drainage Description Yellow 02/27/24 1434   Drainage Amount Small 02/27/24 1434   Dressing Silver dressing;Foam 02/27/24 1434   Dressing Changed Changed 02/27/24 1434   Dressing Status Clean;Dry;Occlusive 02/27/24 1434       Wound 02/14/24 Diabetic Ulcer Foot Left;Plantar (Active)   Wound Image   02/27/24 1432   Site Assessment Dry;Other (Comment) 02/27/24 1432   Ana-Wound Assessment Unable to assess 02/26/24 2122   Non-staged Wound Description Full thickness 02/27/24 1432   Wound Length (cm) 2 cm 02/27/24 1432   Wound Width (cm) 1.5 cm 02/27/24 1432   Wound Surface Area (cm^2) 3 cm^2 02/27/24 1432   Margins Attached edges 02/27/24 1432   Drainage Description None 02/27/24 1432   Drainage Amount None 02/27/24 1432   Dressing Foam 02/27/24 1432   Dressing Changed Changed 02/27/24 1432   Dressing Status Clean;Dry;Occlusive 02/27/24 1432     Patient seen for report of multiple wounds (present  on admission) complicated by PMH: hypertension, CKD, COPD, diabetes, chronic anemia and chronic diabetic foot ulcer. Exam conducted with PCA Vickie to assist with positioning. Patient alert, oriented, denies pain. Patient was previously admitted 2/14-2/18 and was to follow up outpatient with podiatry, no outpatient records indicated follow up seen by this RN. Xray of right foot on 2/25 complete with no significant description to impression noted. Prior admission documentation by [facility] stated “2/17- She was in better spirits today after learning Dr. Pyle does not believe her MRI represents OM but chronic osteitis instead”. Wound draining foul smelling yellow/tan excaudate, culture obtained. No sacral pressure injury noted, preventative foam placed. Calloused area to left plantar foot noted, patient states it was a previous wound that healed, foam placed for padding/protection. Preventative foams placed to bilateral heels as well as heel boots.  Skin hygiene and dressing care provided. See detailed assessment above from flowsheet. Recommendations below, reviewed with Dr. Kemp. Secure chat sent to Dr. Pyle to see if patient followed up upon discharge, no response as of yet.      Wound location: Left plantar foot   Treatment protocol recommended:  Cleanse with normal saline and pat dry.  Apply mepilex foam border every 3 days/prn.   Continue to off load, turning at least every 2 hours. Offload heels.    Wound location: Right plantar foot    Treatment protocol recommended:  Cleanse with normal saline and pat dry.  Apply aquacel ag rope cut to size, cover with mepilex foam every other day/prn.   Consider podiatry consult if patient did not follow up since prior discharge.   Pro stat supplement to aide in wound healing.   Continue to off load, turning at least every 2 hours. Offload heels.    Therapeutic surface: Patient on [employer] standard pressure relieving mattress during exam. Staff to continue to assist patient  with turning/repositioning atleast every 2 hours. Offload heels.     Nursing updated, continue pressure injury preventions, nursing to follow provider orders and re-consult wound RN if needed.    See above recommendations for treatment. I would recommend follow up in [facility] upon discharge.    Please contact me with questions or changes in patient condition.  Cristina Dove RN  [facility]   [phone]

## 2024-02-27 NOTE — PROGRESS NOTES
Physical Therapy    Physical Therapy Evaluation    Patient Name: Tana Hargrove  MRN: [id number]  Today's Date: 2/27/2024   Time Calculation  Start Time: 1112  Stop Time: 1135  Time Calculation (min): 23 min    Assessment/Plan   PT Assessment  PT Assessment Results: Decreased strength, Decreased endurance, Impaired balance, Decreased mobility, Decreased safety awareness  Rehab Prognosis: Fair  Evaluation/Treatment Tolerance: Patient limited by fatigue  End of Session Communication: Bedside nurse  End of Session Patient Position: Bed, 3 rail up, Alarm on (CALL LIGHT IN REACH)  IP OR SWING BED PT PLAN  Inpatient or Swing Bed: Inpatient  PT Plan  Treatment/Interventions: Bed mobility, Transfer training, Gait training, Endurance training, Strengthening  PT Plan: Skilled PT  PT Frequency: 4 times per week  PT Discharge Recommendations: Moderate intensity level of continued care  Equipment Recommended upon Discharge:  (TBD)  PT Recommended Transfer Status: Assist x1, Assist x2 (FWW)  PT - OK to Discharge: Yes (WHEN MEDICALLY CLEARED)      Subjective   General Visit Information:  General  Reason for Referral: impaired mobility, gait training  Referred By: IQRA  Past Medical History Relevant to Rehab: WEAK, FATIGUE, HEADACHE; DX: ESTEE, WEAKNESS; HX: HTN, CKD, COPD, DM, ANEMIA, MDS, EDEMA, CHF  Family/Caregiver Present: Yes  Caregiver Feedback: SON PRESENT  Prior to Session Communication: Bedside nurse  Patient Position Received: Bed, 3 rail up, Alarm on (ROOM 2322, DROWSY, IV)  General Comment: OK PER RN TO SEE FOR MOBILITY  Home Living:  Home Living  Type of Home: Condo  Lives With: Alone  Home Adaptive Equipment: Walker rolling or standard  Home Layout: One level  Home Access: Stairs to enter with rails  Entrance Stairs-Number of Steps: 1  Bathroom Shower/Tub: Tub/shower unit  Prior Level of Function:  Prior Function Per Pt/Caregiver Report  Level of [place]: Independent with ADLs and functional transfers  Receives  Help From: Family  ADL Assistance: Independent  Homemaking Assistance: Independent  Ambulatory Assistance: Independent (ROLLATOR)  Prior Function Comments: pt reports no [facility] services despite recent hospitalizations. reports family checks on her but typically does not physically assist her  Precautions:  Precautions  Medical Precautions: Fall precautions  Vital Signs:       Objective   Pain:  Pain Assessment  Pain Score: 0 - No pain  Cognition:  Cognition  Overall Cognitive Status: Within Functional Limits  Arousal/Alertness: Delayed responses to stimuli  Orientation Level: Disoriented to situation  Following Commands: Follows one step commands without difficulty  Attention: Exceptions to WFL (DROWSY)  Sustained Attention: Impaired  Insight: Mild  Processing Speed: Delayed    General Assessments:                  Activity Tolerance  Endurance: Tolerates 10 - 20 min exercise with multiple rests    Sensation  Sensation Comment: NO C/O    Strength  Strength Comments: ROM LEGS WFL, STRENGTH 3+/5 W/ POOR MUSCUALR ENDURANCE  Strength  Strength Comments: ROM LEGS WFL, STRENGTH 3+/5 W/ POOR MUSCUALR ENDURANCE                     Static Sitting Balance  Static Sitting-Comment/Number of Minutes: FAIR/FAIR-  Dynamic Sitting Balance  Dynamic Sitting-Comments: FAIR-    Static Standing Balance  Static Standing-Comment/Number of Minutes: FAIR-  Dynamic Standing Balance  Dynamic Standing-Comments: FAIR-  Functional Assessments:  Bed Mobility  Bed Mobility:  (SUPINE<>SIT MOD X1 SITS EOB CGA AS TENDS TO LEAN BACKWARDS)    Transfers  Transfer:  (MOD X1 FWW SIT<>STAND  VC FOR SAFETY)    Ambulation/Gait Training  Ambulation/Gait Training Performed:  (FWW AMB 2 FT TO R TOWARDS HOB, LEGSUNSTEADY, SLIDES FEET ON FLOOR NEEDS VC FOR SAFETY)  Extremity/Trunk Assessments:        Outcome Measures:  [facility] Basic Mobility  Turning from your back to your side while in a flat bed without using bedrails: A lot  Moving from lying on your back to  sitting on the side of a flat bed without using bedrails: A lot  Moving to and from bed to chair (including a wheelchair): A lot  Standing up from a chair using your arms (e.g. wheelchair or bedside chair): A lot  To walk in hospital room: Total  Climbing 3-5 steps with railing: Total  Basic Mobility - Total Score: 10    Encounter Problems       Encounter Problems (Active)       Mobility       STG - Patient will ambulate (Not Progressing)       Start:  02/27/24    Expected End:  03/08/24       FWW 75 FT CGA         STRENGTHENING (Not Progressing)       Start:  02/27/24    Expected End:  03/19/24       20 REPS RROM INCREASING STRENGTH TO STABILIZE GAIT            Pain - Adult          Transfers       STG - Patient to transfer to and from sit to supine (Not Progressing)       Start:  02/27/24    Expected End:  03/06/24       MIN A USING RAILS         STG - Patient will transfer sit to and from stand (Not Progressing)       Start:  02/27/24    Expected End:  03/07/24       FWW CGA USING PROPER TECHNIQUE                Education Documentation  Mobility Training, taught by Chantal Segundo PT at 2/27/2024 12:34 PM.  Learner: Family, Patient  Readiness: Acceptance  Method: Explanation  Response: Needs Reinforcement  Comment: FWW SAFETY    Education Comments  No comments found.

## 2024-02-27 NOTE — CONSULTS
Vascular Access Team  Consult     Visit Date: 2/26/2024      Patient Name: Tana Hargrove         MRN: [id number]                Reason for Consult: Type and screen for blood transfusion, difficult IV access       Assessment: US guided IV placed in right upper arm without difficulty x1 attempt in aseptic fashion. Line flushes and draws easily without a tourniquet. Lab collected. Pt tolerated well.        Marifer Vu RN  2/27/2024  11:36 AM

## 2024-02-28 LAB
ALBUMIN SERPL BCP-MCNC: 3.1 G/DL (ref 3.4–5)
ANION GAP SERPL CALC-SCNC: 11 MMOL/L (ref 10–20)
BASOPHILS # BLD AUTO: 0.02 X10*3/UL (ref 0–0.1)
BASOPHILS NFR BLD AUTO: 0.5 %
BLOOD EXPIRATION DATE: NORMAL
BUN SERPL-MCNC: 92 MG/DL (ref 6–23)
CALCIUM SERPL-MCNC: 9.6 MG/DL (ref 8.6–10.3)
CHLORIDE SERPL-SCNC: 113 MMOL/L (ref 98–107)
CO2 SERPL-SCNC: 20 MMOL/L (ref 21–32)
CREAT SERPL-MCNC: 3.47 MG/DL (ref 0.5–1.05)
CYSTATIN C SERPL-MCNC: 2.7 MG/L (ref 0.5–1.2)
DISPENSE STATUS: NORMAL
EGFRCR SERPLBLD CKD-EPI 2021: 13 ML/MIN/1.73M*2
EOSINOPHIL # BLD AUTO: 0.23 X10*3/UL (ref 0–0.4)
EOSINOPHIL NFR BLD AUTO: 6.1 %
ERYTHROCYTE [DISTWIDTH] IN BLOOD BY AUTOMATED COUNT: 21.4 % (ref 11.5–14.5)
GFR/BSA.PRED SERPLBLD CYS-BASED-ARV: 18 ML/MIN/BSA
GLUCOSE BLD MANUAL STRIP-MCNC: 110 MG/DL (ref 74–99)
GLUCOSE BLD MANUAL STRIP-MCNC: 114 MG/DL (ref 74–99)
GLUCOSE BLD MANUAL STRIP-MCNC: 138 MG/DL (ref 74–99)
GLUCOSE BLD MANUAL STRIP-MCNC: 89 MG/DL (ref 74–99)
GLUCOSE SERPL-MCNC: 110 MG/DL (ref 74–99)
HBV SURFACE AB SER-ACNC: 31.9 MIU/ML
HBV SURFACE AG SERPL QL IA: NONREACTIVE
HCT VFR BLD AUTO: 24.3 % (ref 36–46)
HGB BLD-MCNC: 8 G/DL (ref 12–16)
HYPOCHROMIA BLD QL SMEAR: NORMAL
IMM GRANULOCYTES # BLD AUTO: 0.02 X10*3/UL (ref 0–0.5)
IMM GRANULOCYTES NFR BLD AUTO: 0.5 % (ref 0–0.9)
LYMPHOCYTES # BLD AUTO: 1.49 X10*3/UL (ref 0.8–3)
LYMPHOCYTES NFR BLD AUTO: 39.5 %
MCH RBC QN AUTO: 30.1 PG (ref 26–34)
MCHC RBC AUTO-ENTMCNC: 32.9 G/DL (ref 32–36)
MCV RBC AUTO: 91 FL (ref 80–100)
MONOCYTES # BLD AUTO: 0.25 X10*3/UL (ref 0.05–0.8)
MONOCYTES NFR BLD AUTO: 6.6 %
NEUTROPHILS # BLD AUTO: 1.76 X10*3/UL (ref 1.6–5.5)
NEUTROPHILS NFR BLD AUTO: 46.8 %
NRBC BLD-RTO: 0 /100 WBCS (ref 0–0)
OVALOCYTES BLD QL SMEAR: NORMAL
PHOSPHATE SERPL-MCNC: 4.4 MG/DL (ref 2.5–4.9)
PLATELET # BLD AUTO: 156 X10*3/UL (ref 150–450)
POTASSIUM SERPL-SCNC: 5.3 MMOL/L (ref 3.5–5.3)
PRODUCT BLOOD TYPE: 5100
PRODUCT CODE: NORMAL
RBC # BLD AUTO: 2.66 X10*6/UL (ref 4–5.2)
RBC MORPH BLD: NORMAL
SODIUM SERPL-SCNC: 139 MMOL/L (ref 136–145)
UNIT ABO: NORMAL
UNIT NUMBER: NORMAL
UNIT RH: NORMAL
UNIT VOLUME: 350
WBC # BLD AUTO: 3.8 X10*3/UL (ref 4.4–11.3)
XM INTEP: NORMAL

## 2024-02-28 PROCEDURE — 85025 COMPLETE CBC W/AUTO DIFF WBC: CPT | Performed by: INTERNAL MEDICINE

## 2024-02-28 PROCEDURE — 2500000002 HC RX 250 W HCPCS SELF ADMINISTERED DRUGS (ALT 637 FOR MEDICARE OP, ALT 636 FOR OP/ED): Performed by: INTERNAL MEDICINE

## 2024-02-28 PROCEDURE — 97535 SELF CARE MNGMENT TRAINING: CPT | Mod: GO,CO

## 2024-02-28 PROCEDURE — 2500000004 HC RX 250 GENERAL PHARMACY W/ HCPCS (ALT 636 FOR OP/ED): Performed by: INTERNAL MEDICINE

## 2024-02-28 PROCEDURE — 1090000002 HH PPS REVENUE DEBIT

## 2024-02-28 PROCEDURE — 99233 SBSQ HOSP IP/OBS HIGH 50: CPT | Performed by: INTERNAL MEDICINE

## 2024-02-28 PROCEDURE — 2500000001 HC RX 250 WO HCPCS SELF ADMINISTERED DRUGS (ALT 637 FOR MEDICARE OP): Performed by: INTERNAL MEDICINE

## 2024-02-28 PROCEDURE — 97530 THERAPEUTIC ACTIVITIES: CPT | Mod: GO,CO

## 2024-02-28 PROCEDURE — 97116 GAIT TRAINING THERAPY: CPT | Mod: GP,CQ

## 2024-02-28 PROCEDURE — 80069 RENAL FUNCTION PANEL: CPT | Performed by: INTERNAL MEDICINE

## 2024-02-28 PROCEDURE — 82947 ASSAY GLUCOSE BLOOD QUANT: CPT

## 2024-02-28 PROCEDURE — 87340 HEPATITIS B SURFACE AG IA: CPT | Mod: PORLAB | Performed by: INTERNAL MEDICINE

## 2024-02-28 PROCEDURE — 1090000001 HH PPS REVENUE CREDIT

## 2024-02-28 PROCEDURE — 36415 COLL VENOUS BLD VENIPUNCTURE: CPT | Performed by: INTERNAL MEDICINE

## 2024-02-28 PROCEDURE — 1100000001 HC PRIVATE ROOM DAILY

## 2024-02-28 PROCEDURE — 86706 HEP B SURFACE ANTIBODY: CPT | Mod: PORLAB | Performed by: INTERNAL MEDICINE

## 2024-02-28 PROCEDURE — 97530 THERAPEUTIC ACTIVITIES: CPT | Mod: GP,CQ

## 2024-02-28 RX ADMIN — LINEZOLID 600 MG: 600 TABLET, FILM COATED ORAL at 09:02

## 2024-02-28 RX ADMIN — SITAGLIPTIN 25 MG: 50 TABLET, FILM COATED ORAL at 09:02

## 2024-02-28 RX ADMIN — ALLOPURINOL 100 MG: 100 TABLET ORAL at 09:02

## 2024-02-28 RX ADMIN — SEVELAMER CARBONATE 800 MG: 800 TABLET, FILM COATED ORAL at 18:47

## 2024-02-28 RX ADMIN — ASPIRIN 81 MG: 81 TABLET, COATED ORAL at 09:02

## 2024-02-28 RX ADMIN — Medication 2000 UNITS: at 09:02

## 2024-02-28 RX ADMIN — CYANOCOBALAMIN TAB 1000 MCG 1000 MCG: 1000 TAB at 09:02

## 2024-02-28 RX ADMIN — HEPARIN SODIUM 5000 UNITS: 5000 INJECTION INTRAVENOUS; SUBCUTANEOUS at 12:46

## 2024-02-28 RX ADMIN — LINEZOLID 600 MG: 600 TABLET, FILM COATED ORAL at 20:12

## 2024-02-28 RX ADMIN — METOPROLOL TARTRATE 25 MG: 25 TABLET, FILM COATED ORAL at 09:02

## 2024-02-28 RX ADMIN — HEPARIN SODIUM 5000 UNITS: 5000 INJECTION INTRAVENOUS; SUBCUTANEOUS at 03:24

## 2024-02-28 RX ADMIN — SEVELAMER CARBONATE 800 MG: 800 TABLET, FILM COATED ORAL at 09:02

## 2024-02-28 RX ADMIN — HEPARIN SODIUM 5000 UNITS: 5000 INJECTION INTRAVENOUS; SUBCUTANEOUS at 18:47

## 2024-02-28 RX ADMIN — PRAVASTATIN SODIUM 40 MG: 40 TABLET ORAL at 20:12

## 2024-02-28 RX ADMIN — AMLODIPINE BESYLATE 5 MG: 5 TABLET ORAL at 09:02

## 2024-02-28 RX ADMIN — PIOGLITAZONE HYDROCHLORIDE 15 MG: 15 TABLET ORAL at 09:01

## 2024-02-28 RX ADMIN — SEVELAMER CARBONATE 800 MG: 800 TABLET, FILM COATED ORAL at 12:47

## 2024-02-28 ASSESSMENT — ACTIVITIES OF DAILY LIVING (ADL)
HOME_MANAGEMENT_TIME_ENTRY: 15
BATHING_WHERE_ASSESSED: OTHER (COMMENT)
BATHING_LEVEL_OF_ASSISTANCE: MODERATE ASSISTANCE

## 2024-02-28 ASSESSMENT — COGNITIVE AND FUNCTIONAL STATUS - GENERAL
PERSONAL GROOMING: A LOT
STANDING UP FROM CHAIR USING ARMS: A LOT
MOVING FROM LYING ON BACK TO SITTING ON SIDE OF FLAT BED WITH BEDRAILS: A LITTLE
TURNING FROM BACK TO SIDE WHILE IN FLAT BAD: A LITTLE
STANDING UP FROM CHAIR USING ARMS: A LOT
DRESSING REGULAR LOWER BODY CLOTHING: A LOT
MOVING TO AND FROM BED TO CHAIR: A LITTLE
PERSONAL GROOMING: A LOT
TURNING FROM BACK TO SIDE WHILE IN FLAT BAD: A LITTLE
HELP NEEDED FOR BATHING: A LOT
DAILY ACTIVITIY SCORE: 14
MOBILITY SCORE: 14
WALKING IN HOSPITAL ROOM: A LOT
WALKING IN HOSPITAL ROOM: A LOT
DRESSING REGULAR UPPER BODY CLOTHING: A LOT
DRESSING REGULAR UPPER BODY CLOTHING: A LOT
DAILY ACTIVITIY SCORE: 14
TOILETING: A LOT
MOVING TO AND FROM BED TO CHAIR: A LITTLE
DRESSING REGULAR LOWER BODY CLOTHING: A LOT
TOILETING: A LOT
CLIMB 3 TO 5 STEPS WITH RAILING: TOTAL
MOVING FROM LYING ON BACK TO SITTING ON SIDE OF FLAT BED WITH BEDRAILS: A LITTLE
HELP NEEDED FOR BATHING: A LOT
CLIMB 3 TO 5 STEPS WITH RAILING: TOTAL
MOBILITY SCORE: 14

## 2024-02-28 ASSESSMENT — PAIN SCALES - GENERAL
PAINLEVEL_OUTOF10: 0 - NO PAIN

## 2024-02-28 ASSESSMENT — PAIN - FUNCTIONAL ASSESSMENT
PAIN_FUNCTIONAL_ASSESSMENT: 0-10
PAIN_FUNCTIONAL_ASSESSMENT: 0-10

## 2024-02-28 NOTE — PROGRESS NOTES
Met with patient to see if any SNF choices have been made. Patient stating family identified a facility in [place] but she is not sure of the name. Patient asked that I call her son Vadim to discuss. Attempted to reach Vadim via phone and left VM requesting a return call.     1110: Spoke with Vadim who identified [facility] as FOC and only choice at this time. Request placed to [facility] to send the referral Patient will require an authorization from insurance to admit.     1235: Updated by nephrology that patient will be restarting hemodialysis. She was previously a patient at [facility]. Referral placed via [email or web address] portal. [facility] is willing to accept and stated they should be able to manage patient's transportation to/from HD. Patient and her son Adolph were updated at bedside. Patient will need insurance authorization to admit. We will start the auth once patient has had a dialysis treatment.

## 2024-02-28 NOTE — PROGRESS NOTES
"Music Therapy Note    Tana Hargrove was referred by PX    Therapy Session  Referral Type: New referral this admission  Visit Type: Follow-up visit  Session Start Time: 1155  Session End Time: 1156  Intervention Delivery: In-person  Conflict of Service: None  Number of family members present: 1  Family Present for Session: Other (Comment)  Number of staff members present: 0     Pre-assessment  Unable to Assess Reason: Outcomes not assessed  Mood/Affect: Calm, Tired/lethargic, Sad/tearful  Verbalized Emotional State:  (\"alright\")         Treatment/Interventions       Post-assessment  Unable to Assess Reason: Did not provide expressive therapy intervention  Total Session Time (min): 1 minutes    Narrative  Assessment Detail: Upon arrival of music therapist, pt was seen in room with the lights off, alert, awake, displaying flat/tired affect. Pt had a family member in the room. It is noted per report from PX that pt has had a long stay within the hospital and has had make several difficult descisions. Pt expressed that she would like to have music therapy services at a later time. Pt reported that she enjoys listening to country/western music.  Plan: Music therapist provided follow up with pt with a focus on providing pt comfort via offering a sense of control  Intervention: No invtervention at this time.  Evaluation: NA  Follow-up: Will follow up.    Education Documentation  No documentation found.          "

## 2024-02-28 NOTE — PROGRESS NOTES
Tana Hargrove is a 78 y.o. female on day 2 of admission presenting with Acute kidney injury (nontraumatic) (CMS/HCC).      Subjective   Tana Hargrove is a 78 y.o. female with PMHx of HTN, HLD, chronic diastolic heart failure, COPD (no baseline O2), CKD, NIDDM-II, MDS with chronic anemia requiring Procrit and intermittent PRBC transfusions, OA, GERD, who presents to the emergency room with generalized fatigue and weakness.  Patient was recently discharged about 1 week prior to presentation after treatment for urinary tract infection.  She has been feeling much better at home up until the day prior to presentation when she suddenly fell.  She describes it as slumping down as she was attempting to go to the bathroom while using the walker.  This was a second episode on that day.  She denies any chest pain abdominal pain fever chills nausea or vomiting she denies any diarrhea prior to this she has been taking her medications regularly.  She admits to eating well but not drinking enough because she was concerned about overloading herself with fluid as she was told by a primary doctor.        On admission in the emergency room she was hemodynamically stable with a blood pressure 118/54 mmHg, heart rate of 66/min, respiratory rate of 18/min, temperature 36.5 °C, and was saturating 95% on room air.  Initial BMP showed sodium of 134 with a chloride of 108, bicarbonate of 16, BUN/creatinine of 113/4.67.  CBC showed a hemoglobin of 7.1.  His CT of the head was done which showed no acute intracranial abnormality.  Chest x-ray showed no acute cardiopulmonary disease and a right foot x-ray showed no acute osseous abnormality as well with mild multifocal age-related osteoarthritis changes.  Urine analysis showed no signs of UTI.  Patient has been admitted for further evaluation and management.  2/27: Patient was seen and examined.  Still complaining of generalized weakness.  Hemoglobin is 6.9 today after 1 units of packed red  blood cell transfusion.  Likely related to hemodilution.  I will transfuse 1 more unit of packed red blood cell today.  Creatinine is improving down to 4.0 today.  Continue IV bicarbonate and nephrologist.  Nephrology is on board and recommendations appreciated.  Seen by PT OT who recommend extended-care facility however patient is adamantly refusing SNF placement.  Repeat CBC and BMP in AM.  2/28: Patient was seen and examined.  She is clinically better today.  Creatinine is around her baseline at 3.47 today.  Hemoglobin is 8.0 today.  Currently awaiting authorization for placement in an extended care facility       Objective     Last Recorded Vitals  /62 (BP Location: Left arm, Patient Position: Sitting)   Pulse 68   Temp 36.9 °C (98.4 °F)   Resp 16   Wt 72 kg (158 lb 11.7 oz)   SpO2 97%   Intake/Output last 3 Shifts:    Intake/Output Summary (Last 24 hours) at 2/28/2024 1011  Last data filed at 2/28/2024 0938  Gross per 24 hour   Intake 1293.75 ml   Output 1300 ml   Net -6.25 ml         Admission Weight  Weight: 72 kg (158 lb 11.7 oz) (02/25/24 2204)    Daily Weight  02/25/24 : 72 kg (158 lb 11.7 oz)    Image Results  ECG 12 lead  Sinus rhythm  CT head wo IV contrast  Narrative: Interpreted By:  Katherin Díaz,   STUDY:  CT HEAD WO IV CONTRAST;  2/26/2024 1:35 am      INDICATION:  Signs/Symptoms:double vision.      COMPARISON:  08/11/2023      ACCESSION NUMBER(S):  [id number]      ORDERING CLINICIAN:  IVAN EARL      TECHNIQUE:  Axial noncontrast CT images of the head.      FINDINGS:  BRAIN PARENCHYMA:  Gray-white matter interfaces are preserved. No  mass effect or midline shift.      HEMORRHAGE: No acute intracranial hemorrhage.  VENTRICLES and EXTRA-AXIAL SPACES: The ventricles and sulci are  within normal limits in size for brain volume. No abnormal extraaxial  fluid collection. EXTRACRANIAL SOFT TISSUES: Within normal limits.  PARANASAL SINUSES/MASTOIDS:  The visualized paranasal sinuses  and  mastoid air cells are aerated. CALVARIUM: No depressed skull  fracture. No destructive osseous lesion.      OTHER FINDINGS: None.      Impression: No acute intracranial abnormality.          MACRO:  None      Signed by: Katherin Díaz 2/26/2024 2:14 AM  Dictation workstation:   [id number]  XR foot right 3+ views  Narrative: STUDY:  Foot Radiographs; 2/26/2024 00:08  INDICATION:  Foot wound, rule out osteo.  COMPARISON:  2/14/2024 XR Foot Right.  ACCESSION NUMBER(S):  [id number]  ORDERING CLINICIAN:  IVAN EARL  TECHNIQUE:  Three view(s) of the right foot.  FINDINGS:    There is no displaced fracture.  Mild hallux valgus configuration of  the great toe is noted.  There is mild joint space narrowing in the  interphalangeal joints as well as the first tarsal metatarsal joint  and first MTP joint.  Small Achilles enthesophyte and small plantar  calcaneal spur noted.  Peripheral vascular consultations are seen in  the ankle.  No soft tissue abnormality is seen.  Impression: No acute osseous abnormality identified.  Mild multifocal age-related  osteoarthritic changes as described.  Signed by Claudio Martínez  XR chest 1 view  Narrative: STUDY:  Chest Radiograph;  2/26/2024  INDICATION:  Weakness.  COMPARISON:  12/14/2024 XR Chest  ACCESSION NUMBER(S):  [id number]  ORDERING CLINICIAN:  IVAN EARL  TECHNIQUE:  Frontal chest was obtained at 00:08 hours.  FINDINGS:  CARDIOMEDIASTINAL SILHOUETTE:  Cardiomediastinal silhouette is normal in size and configuration.     LUNGS:  Lungs are clear.  Elevated right hemidiaphragm is noted.     ABDOMEN:  No remarkable upper abdominal findings.     BONES:  No acute osseous changes.  Impression: No acute cardiopulmonary disease.  Signed by Claudio Martínez      Physical Exam  Constitutional:       General: She is not in acute distress.     Appearance: Normal appearance. She is not ill-appearing or diaphoretic.   HENT:      Head: Normocephalic and atraumatic.      Mouth/Throat:       Mouth: Mucous membranes are moist.      Pharynx: Oropharynx is clear.   Eyes:      Extraocular Movements: Extraocular movements intact.      Conjunctiva/sclera: Conjunctivae normal.      Pupils: Pupils are equal, round, and reactive to light.   Neck:      Vascular: No carotid bruit.   Cardiovascular:      Rate and Rhythm: Normal rate and regular rhythm.      Pulses: Normal pulses.      Heart sounds: Murmur heard.      Comments: Faint ejection murmur heard best at the left lower sternal border  Pulmonary:      Comments: Diminished breath sounds are all lung fields  Abdominal:      General: Abdomen is flat. Bowel sounds are normal. There is no distension.      Palpations: Abdomen is soft. There is no mass.      Tenderness: There is no abdominal tenderness. There is no guarding or rebound.   Musculoskeletal:      Cervical back: Normal range of motion and neck supple. No rigidity or tenderness.      Right lower leg: Edema present.      Left lower leg: Edema present.      Comments: Trace pitting edema of bilateral lower extremities Skin:     General: Skin is warm and dry.      Capillary Refill: Capillary refill takes less than 2 seconds.      Findings:  No bruising or rash.      Comments:   Neurological:      Mental Status: She is alert and oriented to person, place, and time.      Cranial Nerves: No cranial nerve deficit.      Sensory: Sensory deficit present.      Motor: No weakness.      Coordination: Coordination normal.      Gait: Gait normal.      Comments: AOx3,    Psychiatric:         Mood and Affect: Mood normal.         Behavior: Behavior normal.         Thought Content: Thought content normal.         Judgment: Judgment normal.   Relevant Results               Assessment/Plan                  Principal Problem:    Acute kidney injury (nontraumatic) (CMS/[facility])    #ESTEE on CKD IV  Improved  -Likely related to dehydration from overdiuresis  -Baseline sCr has been variable since the beginning of the year,  ~2.6-3.4  -On admission creatinine 4.67>>>3.47  -Will hold diuresis with Lasix for now  - Continue IV discontinued  -Trend BMP daily  -Nephrologist on board     #Acute on chronic anemia  Likely related to MDS and anemia of chronic disease from worsening renal failure  Status post 1 unit of packed red blood cell transfusion.  I will transfuse 1 more unit  Continue Procrit per nephrology  Trend CBC daily        #Chronic diastolic heart failure   -Resume goal-directed medical therapy  -Hold diuretics for now  -      #HTN,   -Resume antihypertensives with parameters     Hyperlipidemia  Resume statins     #COPD without acute exacerbation   -Continue home therapies      #NIDDM-II   -Accucheks ACHS  -Insulin sliding scale  -Hemoglobin A1c in a.m.-        #Generalized weakness, acute on chronic deconditioning   -Likely 2/2 above   -Continue management  -PT/OT/CM consults appreciated      #DVT prophylaxis  Subcutaneous heparin             Spent 35 minutes in the follow-up management of this patient today    Naga Carr MD

## 2024-02-28 NOTE — CARE PLAN
The clinical goals for the shift include Patient will have an improved BUN and creatitine level by the end of my shift.    Over the shift, the patient did make progress toward the following goals.    Problem: Skin  Goal: Participates in plan/prevention/treatment measures  Outcome: Progressing  Flowsheets (Taken 2/28/2024 0528)  Participates in plan/prevention/treatment measures:   Elevate heels   Increase activity/out of bed for meals  Goal: Promote/optimize nutrition  Outcome: Progressing  Flowsheets (Taken 2/28/2024 0528)  Promote/optimize nutrition:   Monitor/record intake including meals   Consume > 50% meals/supplements  Goal: Promote skin healing  Outcome: Progressing  Flowsheets (Taken 2/28/2024 0528)  Promote skin healing: Assess skin/pad under line(s)/device(s)     Problem: Fall/Injury  Goal: Not fall by end of shift  Outcome: Progressing  Goal: Be free from injury by end of the shift  Outcome: Progressing  Goal: Verbalize understanding of personal risk factors for fall in the hospital  Outcome: Progressing  Goal: Verbalize understanding of risk factor reduction measures to prevent injury from fall in the home  Outcome: Progressing  Goal: Use assistive devices by end of the shift  Outcome: Progressing  Goal: Pace activities to prevent fatigue by end of the shift  Outcome: Progressing     Problem: Pain  Goal: Takes deep breaths with improved pain control throughout the shift  Outcome: Progressing  Goal: Turns in bed with improved pain control throughout the shift  Outcome: Progressing  Goal: Walks with improved pain control throughout the shift  Outcome: Progressing  Goal: Performs ADL's with improved pain control throughout shift  Outcome: Progressing  Goal: Participates in PT with improved pain control throughout the shift  Outcome: Progressing  Goal: Free from opioid side effects throughout the shift  Outcome: Progressing  Goal: Free from acute confusion related to pain meds throughout the shift  Outcome:  Progressing     Problem: Pain - Adult  Goal: Verbalizes/displays adequate comfort level or baseline comfort level  Outcome: Progressing     Problem: Safety - Adult  Goal: Free from fall injury  Outcome: Progressing     Problem: Discharge Planning  Goal: Discharge to home or other facility with appropriate resources  Outcome: Progressing     Problem: Chronic Conditions and Co-morbidities  Goal: Patient's chronic conditions and co-morbidity symptoms are monitored and maintained or improved  Outcome: Progressing     Problem: Diabetes  Goal: Achieve decreasing blood glucose levels by end of shift  Outcome: Progressing  Goal: Increase stability of blood glucose readings by end of shift  Outcome: Progressing  Goal: Decrease in ketones present in urine by end of shift  Outcome: Progressing  Goal: Maintain electrolyte levels within acceptable range throughout shift  Outcome: Progressing  Goal: Maintain glucose levels >70mg/dl to <250mg/dl throughout shift  Outcome: Progressing  Goal: No changes in neurological exam by end of shift  Outcome: Progressing  Goal: Learn about and adhere to nutrition recommendations by end of shift  Outcome: Progressing  Goal: Vital signs within normal range for age by end of shift  Outcome: Progressing  Goal: Increase self care and/or family involovement by end of shift  Outcome: Progressing  Goal: Receive DSME education by end of shift  Outcome: Progressing

## 2024-02-28 NOTE — CARE PLAN
The patient's goals for the shift include      The clinical goals for the shift include Patient will have an improved BUN and creatitine level by the end of my shift.      Problem: Skin  Goal: Participates in plan/prevention/treatment measures  Outcome: Progressing  Flowsheets (Taken 2/28/2024 1440)  Participates in plan/prevention/treatment measures: Elevate heels  Goal: Promote/optimize nutrition  Outcome: Progressing  Flowsheets (Taken 2/28/2024 1440)  Promote/optimize nutrition: Monitor/record intake including meals  Goal: Promote skin healing  Outcome: Progressing  Flowsheets (Taken 2/28/2024 1440)  Promote skin healing: Turn/reposition every 2 hours/use positioning/transfer devices     Problem: Fall/Injury  Goal: Not fall by end of shift  Outcome: Progressing  Goal: Be free from injury by end of the shift  Outcome: Progressing  Goal: Verbalize understanding of personal risk factors for fall in the hospital  Outcome: Progressing  Goal: Verbalize understanding of risk factor reduction measures to prevent injury from fall in the home  Outcome: Progressing  Goal: Use assistive devices by end of the shift  Outcome: Progressing  Goal: Pace activities to prevent fatigue by end of the shift  Outcome: Progressing     Problem: Pain  Goal: Takes deep breaths with improved pain control throughout the shift  Outcome: Progressing  Goal: Turns in bed with improved pain control throughout the shift  Outcome: Progressing  Goal: Walks with improved pain control throughout the shift  Outcome: Progressing  Goal: Performs ADL's with improved pain control throughout shift  Outcome: Progressing  Goal: Participates in PT with improved pain control throughout the shift  Outcome: Progressing  Goal: Free from opioid side effects throughout the shift  Outcome: Progressing  Goal: Free from acute confusion related to pain meds throughout the shift  Outcome: Progressing     Problem: Pain - Adult  Goal: Verbalizes/displays adequate comfort  level or baseline comfort level  Outcome: Progressing     Problem: Safety - Adult  Goal: Free from fall injury  Outcome: Progressing     Problem: Discharge Planning  Goal: Discharge to home or other facility with appropriate resources  Outcome: Progressing     Problem: Chronic Conditions and Co-morbidities  Goal: Patient's chronic conditions and co-morbidity symptoms are monitored and maintained or improved  Outcome: Progressing     Problem: Diabetes  Goal: Achieve decreasing blood glucose levels by end of shift  Outcome: Progressing  Goal: Increase stability of blood glucose readings by end of shift  Outcome: Progressing  Goal: Decrease in ketones present in urine by end of shift  Outcome: Progressing  Goal: Maintain electrolyte levels within acceptable range throughout shift  Outcome: Progressing  Goal: Maintain glucose levels >70mg/dl to <250mg/dl throughout shift  Outcome: Progressing  Goal: No changes in neurological exam by end of shift  Outcome: Progressing  Goal: Learn about and adhere to nutrition recommendations by end of shift  Outcome: Progressing  Goal: Vital signs within normal range for age by end of shift  Outcome: Progressing  Goal: Increase self care and/or family involovement by end of shift  Outcome: Progressing  Goal: Receive DSME education by end of shift  Outcome: Progressing

## 2024-02-28 NOTE — PROGRESS NOTES
"      Nephrology Progress Note      Nephrology following for ESTEE on CKD IV.   Events over night:   No acute events overnight.  Says she is feeling out of it this morning as she did not sleep well last night.  Feeling fatigued.  Tremors improving.    /62 (BP Location: Left arm, Patient Position: Sitting)   Pulse 68   Temp 36.9 °C (98.4 °F)   Resp 16   Ht 1.549 m (5' 1\")   Wt 72 kg (158 lb 11.7 oz)   SpO2 97%   BMI 29.99 kg/m²     Input / Output:  24 HR:   Intake/Output Summary (Last 24 hours) at 2/28/2024 0949  Last data filed at 2/28/2024 0938  Gross per 24 hour   Intake 1293.75 ml   Output 1300 ml   Net -6.25 ml         Physical Exam   Alert and oriented x 3 NAD  Neck: no JVD  CV: RRR  Lungs: CTA bilaterally  Abd: soft, NT, ND   Ext: minimal lower extremity edema    Scheduled medications  allopurinol, 100 mg, oral, Daily  amLODIPine, 5 mg, oral, Daily  aspirin, 81 mg, oral, Daily  cholecalciferol, 2,000 Units, oral, Daily  cyanocobalamin, 1,000 mcg, oral, Daily  heparin (porcine), 5,000 Units, subcutaneous, q8h  insulin lispro, 0-5 Units, subcutaneous, TID with meals  linezolid, 600 mg, oral, BID  metoprolol tartrate, 25 mg, oral, Daily  pioglitazone, 15 mg, oral, Daily  pravastatin, 40 mg, oral, Nightly  pregabalin, 75 mg, oral, Every other day  sevelamer carbonate, 800 mg, oral, TID with meals  SITagliptin phosphate, 25 mg, oral, Daily      Continuous medications       PRN medications  PRN medications: acetaminophen, colchicine, dextrose 10 % in water (D10W), dextrose, glucagon, meclizine, sennosides-docusate sodium   Results from last 7 days   Lab Units 02/28/24  0548 02/27/24  0318   SODIUM mmol/L 139 136   POTASSIUM mmol/L 5.3 5.4*   CHLORIDE mmol/L 113* 112*   CO2 mmol/L 20* 19*   BUN mg/dL 92* 96*   CREATININE mg/dL 3.47* 4.00*   CALCIUM mg/dL 9.6 8.5*   PROTEIN TOTAL g/dL  --  5.2*   BILIRUBIN TOTAL mg/dL  --  0.4   ALK PHOS U/L  --  38   ALT U/L  --  10   AST U/L  --  16   GLUCOSE mg/dL 110* " 126*        Results from last 7 days   Lab Units 02/25/24  2319   MAGNESIUM mg/dL 2.12        Results from last 7 days   Lab Units 02/28/24  0548 02/27/24  0318 02/26/24  0946   WBC AUTO x10*3/uL 3.8* 4.3* 3.7*   HEMOGLOBIN g/dL 8.0* 6.9* 6.8*   HEMATOCRIT % 24.3* 21.4* 21.7*   PLATELETS AUTO x10*3/uL 156 197 212          Assessment & Plan:     Assessment:   Patient is 78 y.o. female who is admitted to hospital for weakness. Nephrology consulted in view of ESTEE on CKD IV.     Acute kidney injury on CKD stage IV  - Patient does have a history of requiring dialysis in the past from Dec 2021 to March 2022  - Patient's creatinine has been worsening over past month, it was 3.07 on 2/18 upon previous discharge. Prior to that her baseline was around 2.6.  - Creatinine upon admission is 4.67,   - Fatigue and weakness may be secondary to uremia, patient experiencing some myoclonic jerks and asterixis as well  - UA with no evidence of infection, 1+ protein which is not new  - No recent hypotension or nephrotoxins  - Received 1L of IV fluids in the ED  - Creatinine down trending     Acute on chronic anemia  - Secondary to CKD and MDS  - Hb 7.1 --> 6.8 --> 6.9 (after 1 unit PRBC's)  - On high-dose Procrit weekly  - Receives intermittent transfusions as needed     Hyperkalemia  - mild  - no EKG changes     Hyperphosphatemia  - phosphate binder     Metabolic acidosis  - bicarb 16  - add bicarb to fluids  - improving     Volume status  - appears euvolemic  - minimal lower extremity swelling  - no shortness of breath, chest x-ray clear        Recommendations:   - Continue to hold diuretics  - Avoid nephrotoxic medications and contrast  - Monitor I&O's  - BMP in a.m.  - Blood transfusions per primary team  - Switched Lyrica to every other day due to patient having myoclonic jerks/asterixis which could be related to Lyrica excess  - Discontinued IV fluids as patient is euvolemic and has adequate p.o. intake  - Had a discussion  with patient and son yesterday regarding starting dialysis soon as patient may not have significant improvement in kidney function. Will discuss with patient again today.      Please message me through [email or web address] chat with any questions or concerns.     JORDAN Pascal IV  2/28/2024  9:49 AM     [facility]    [address], [address]   [place] [address]  Office: [phone]

## 2024-02-28 NOTE — PROGRESS NOTES
Physical Therapy    Physical Therapy Treatment    Patient Name: Tana Hargrove  MRN: [id number]  Today's Date: 2/28/2024  Time Calculation  Start Time: 0924  Stop Time: 0948  Time Calculation (min): 24 min       Assessment/Plan   PT Assessment  PT Assessment Results: Decreased strength, Decreased range of motion, Decreased endurance, Impaired balance, Decreased mobility  End of Session Communication: PCT/NA/CTA  Assessment Comment: patient increased gait by 10 feet, patient  still requires verbal cues for safety with transfers and mobility. fatigues quickly  End of Session Patient Position: Up in chair, Alarm off, caregiver present (aide going to put pur wick in and turn alarm on)  PT Plan  Inpatient/Swing Bed or Outpatient: Inpatient  PT Plan  Treatment/Interventions: Bed mobility, Transfer training, Gait training, Endurance training, Therapeutic exercise, Therapeutic activity  PT Plan: Skilled PT  PT Frequency: 4 times per week  PT Discharge Recommendations: Moderate intensity level of continued care  Equipment Recommended upon Discharge:  (TBD)  PT Recommended Transfer Status: Assist x 1, Assist x 2 (FWW)  PT - OK to Discharge: Yes (WHEN MEDICALLY CLEARED)      General Visit Information:   PT  Visit  PT Received On: 02/28/24  Response to Previous Treatment: Patient reporting fatigue but able to participate.  General  Prior to Session Communication: Bedside nurse  Patient Position Received: Bed, 3 rail up, Alarm off, caregiver present  General Comment: agrees to therapy    Subjective   Precautions:     Vital Signs:       Objective   Pain:  Pain Assessment  Pain Assessment: 0-10  Pain Score: 0 - No pain (no pain)  Cognition:  Cognition  Overall Cognitive Status: Within Functional Limits  Postural Control:     Extremity/Trunk Assessments:    Activity Tolerance:  Activity Tolerance  Endurance: Tolerates 30 min exercise with multiple rests  Treatments:  Therapeutic Exercise  Therapeutic Exercise Performed:  Yes  Therapeutic Exercise Activity 1: ankle pumps, marches, knee extension 10 reps each    Therapeutic Activity  Therapeutic Activity Performed: Yes  Therapeutic Activity 1: static standing x 2 mins x2 reps while cleaning    Bed Mobility  Bed Mobility: Yes    Ambulation/Gait Training  Ambulation/Gait Training Performed: Yes  Ambulation/Gait Training 1  Comments/Distance (ft) 1: gait training with FWW 12 feet x 2 reps with seated active rest between.  Transfers  Transfer: Yes  Transfer 1  Transfer From 1: Sit to  Transfer to 1: Stand, Toilet  Transfer Level of Assistance 1: Contact guard    Outcome Measures:  [facility] Basic Mobility  Turning from your back to your side while in a flat bed without using bedrails: A little  Moving from lying on your back to sitting on the side of a flat bed without using bedrails: A little  Moving to and from bed to chair (including a wheelchair): A little  Standing up from a chair using your arms (e.g. wheelchair or bedside chair): A lot  To walk in hospital room: A lot  Climbing 3-5 steps with railing: Total  Basic Mobility - Total Score: 14    Education Documentation  Mobility Training, taught by Nora Valencia PTA at 2/28/2024 10:31 AM.  Learner: Patient  Readiness: Acceptance  Method: Explanation  Response: Verbalizes Understanding    Education Comments  No comments found.        OP EDUCATION:       Encounter Problems       Encounter Problems (Active)       Mobility       STG - Patient will ambulate (Progressing)       Start:  02/27/24    Expected End:  02/29/24       FWW 75 FT CGA         STRENGTHENING (Progressing)       Start:  02/27/24    Expected End:  02/29/24       20 REPS RROM INCREASING STRENGTH TO STABILIZE GAIT            Pain - Adult          Transfers       STG - Patient to transfer to and from sit to supine (Progressing)       Start:  02/27/24    Expected End:  02/29/24       MIN A USING RAILS         STG - Patient will transfer sit to and from stand (Progressing)        Start:  02/27/24    Expected End:  02/29/24       FWW CGA USING PROPER TECHNIQUE

## 2024-02-28 NOTE — PROGRESS NOTES
Occupational Therapy    OT Treatment    Patient Name: Tana Hargrove  MRN: [id number]  Today's Date: 2/28/2024  Time Calculation  Start Time: 0903  Stop Time: 0933  Time Calculation (min): 30 min         Assessment:  End of Session Communication: PCT/CARLITO/JULIO  End of Session Patient Position: Up in chair, Alarm on (left with PTA)     Plan:  Treatment Interventions: ADL retraining, Functional transfer training, UE strengthening/ROM, Endurance training  OT Frequency: 3 times per week  OT Discharge Recommendations: Moderate intensity level of continued care  OT - OK to Discharge: Yes  Treatment Interventions: ADL retraining, Functional transfer training, UE strengthening/ROM, Endurance training    Subjective   Previous Visit Info:  OT Last Visit  OT Received On: 02/28/24  General:  General  Missed Visit: Yes  Missed Visit Reason:  (pt. recieving blood at this time)  Prior to Session Communication: Bedside nurse, PCT/CARLITO/JULIO  Patient Position Received: Bed, 3 rail up, Alarm on  General Comment: Pt. doing well just reports fatigue this a.m.Transfers now MIN A  x1, Bathing UB MIN A, LB MOD A, Grooming set up. Pt. will benefit from continue skilled OT to increase safety and self independence.  Precautions:     Vital Signs:     Pain:       Objective    Cognition:  Cognition  Overall Cognitive Status: Within Functional Limits  Orientation Level: Oriented X4  Coordination:     Activities of Daily Living: Grooming  Grooming Level of Assistance: Setup  Grooming Where Assessed: Chair    UE Bathing  UE Bathing Level of Assistance: Minimum assistance  UE Bathing Where Assessed: Other (Comment) (chair using bedside table)    LE Bathing  LE Bathing Level of Assistance: Moderate assistance  LE Bathing Where Assessed: Other (Comment) (chair)    UE Dressing  UE Dressing Level of Assistance: Minimum assistance  UE Dressing Where Assessed: Chair  UE Dressing Comments: gown    LE Dressing  LE Dressing: Yes  Sock Level of Assistance: Maximum  assistance  Adult Briefs Level of Assistance: Moderate assistance  LE Dressing Where Assessed: Other (Comment), Chair (standing to manage brief over hips)    Toileting  Toileting Level of Assistance: Moderate assistance  Where Assessed: Other (Comment) (chair)  Functional Standing Tolerance:  Time: 2 mins standing  Activity: during standing for hygiene, bathing and clothing management  Functional Standing Tolerance Comments: no los of balance .  Bed Mobility/Transfers: Bed Mobility 1  Bed Mobility 1: Supine to sitting  Level of Assistance 1: Minimum assistance  Bed Mobility Comments 1: cueing to use bed rail  Bed Mobility 2  Bed Mobility  2: Rolling right  Level of Assistance 2: Minimum assistance  Bed Mobility Comments 2: cueing to use bed  rail    Transfer 1  Transfer From 1: Sit to  Transfer to 1: Stand  Technique 1: Stand to sit  Transfer Device 1: Walker  Transfer Level of Assistance 1: Minimum assistance  Transfers 2  Transfer From 2: Bed to  Transfer to 2: Chair with arms  Technique 2: Stand pivot  Transfer Device 2: Walker  Transfer Level of Assistance 2: Minimum assistance  Trials/Comments 2: cueing for posture      Outcome Measures:[facility] Daily Activity  Putting on and taking off regular lower body clothing: A lot  Bathing (including washing, rinsing, drying): A lot  Putting on and taking off regular upper body clothing: A lot  Toileting, which includes using toilet, bedpan or urinal: A lot  Taking care of personal grooming such as brushing teeth: A lot  Eating Meals: None  Daily Activity - Total Score: 14        Education Documentation  ADL Training, taught by PEDRO Cornell at 2/28/2024 10:27 AM.  Learner: Patient  Readiness: Acceptance  Method: Explanation  Response: Verbalizes Understanding, Needs Reinforcement    Education Comments  No comments found.        OP EDUCATION:       Goals:  Encounter Problems       Encounter Problems (Active)       ADLs       Patient with complete lower body  dressing with set-up and stand by assist level of assistance donning and doffing all LE clothes  with PRN adaptive equipment while supported sitting and standing (Progressing)       Start:  02/26/24    Expected End:  02/29/24               BALANCE       Pt will maintain dynamic standing balance x8 minutes during ADL task with stand by assist level of assistance in order to demonstrate decreased risk of falling and improved postural control. (Progressing)       Start:  02/26/24    Expected End:  02/29/24                       TRANSFERS       Patient will complete functional transfers and functional mobility to bathroom level with least restrictive device with stand by assist level of assistance. (Progressing)       Start:  02/26/24    Expected End:  02/29/24

## 2024-02-29 ENCOUNTER — APPOINTMENT (OUTPATIENT)
Dept: CARDIOLOGY | Facility: HOSPITAL | Age: 79
DRG: 674 | End: 2024-02-29
Payer: MEDICARE

## 2024-02-29 ENCOUNTER — APPOINTMENT (OUTPATIENT)
Dept: DIALYSIS | Facility: HOSPITAL | Age: 79
End: 2024-02-29
Payer: MEDICARE

## 2024-02-29 LAB
GLUCOSE BLD MANUAL STRIP-MCNC: 104 MG/DL (ref 74–99)
GLUCOSE BLD MANUAL STRIP-MCNC: 150 MG/DL (ref 74–99)
GLUCOSE BLD MANUAL STRIP-MCNC: 93 MG/DL (ref 74–99)
GLUCOSE BLD MANUAL STRIP-MCNC: 97 MG/DL (ref 74–99)

## 2024-02-29 PROCEDURE — C1750 CATH, HEMODIALYSIS,LONG-TERM: HCPCS

## 2024-02-29 PROCEDURE — 36560 INSERT TUNNELED CV CATH: CPT

## 2024-02-29 PROCEDURE — 0JH63XZ INSERTION OF TUNNELED VASCULAR ACCESS DEVICE INTO CHEST SUBCUTANEOUS TISSUE AND FASCIA, PERCUTANEOUS APPROACH: ICD-10-PCS | Performed by: RADIOLOGY

## 2024-02-29 PROCEDURE — 36558 INSERT TUNNELED CV CATH: CPT | Performed by: RADIOLOGY

## 2024-02-29 PROCEDURE — 5A1D70Z PERFORMANCE OF URINARY FILTRATION, INTERMITTENT, LESS THAN 6 HOURS PER DAY: ICD-10-PCS | Performed by: INTERNAL MEDICINE

## 2024-02-29 PROCEDURE — 2500000002 HC RX 250 W HCPCS SELF ADMINISTERED DRUGS (ALT 637 FOR MEDICARE OP, ALT 636 FOR OP/ED): Performed by: INTERNAL MEDICINE

## 2024-02-29 PROCEDURE — 2500000001 HC RX 250 WO HCPCS SELF ADMINISTERED DRUGS (ALT 637 FOR MEDICARE OP): Performed by: INTERNAL MEDICINE

## 2024-02-29 PROCEDURE — 77001 FLUOROGUIDE FOR VEIN DEVICE: CPT | Performed by: RADIOLOGY

## 2024-02-29 PROCEDURE — 82947 ASSAY GLUCOSE BLOOD QUANT: CPT

## 2024-02-29 PROCEDURE — 99152 MOD SED SAME PHYS/QHP 5/>YRS: CPT | Performed by: RADIOLOGY

## 2024-02-29 PROCEDURE — 02HV33Z INSERTION OF INFUSION DEVICE INTO SUPERIOR VENA CAVA, PERCUTANEOUS APPROACH: ICD-10-PCS | Performed by: RADIOLOGY

## 2024-02-29 PROCEDURE — 99233 SBSQ HOSP IP/OBS HIGH 50: CPT | Performed by: INTERNAL MEDICINE

## 2024-02-29 PROCEDURE — 1090000001 HH PPS REVENUE CREDIT

## 2024-02-29 PROCEDURE — 2500000005 HC RX 250 GENERAL PHARMACY W/O HCPCS: Performed by: RADIOLOGY

## 2024-02-29 PROCEDURE — 2500000004 HC RX 250 GENERAL PHARMACY W/ HCPCS (ALT 636 FOR OP/ED): Performed by: INTERNAL MEDICINE

## 2024-02-29 PROCEDURE — 1090000002 HH PPS REVENUE DEBIT

## 2024-02-29 PROCEDURE — 2500000004 HC RX 250 GENERAL PHARMACY W/ HCPCS (ALT 636 FOR OP/ED): Performed by: RADIOLOGY

## 2024-02-29 PROCEDURE — 7100000009 HC PHASE TWO TIME - INITIAL BASE CHARGE

## 2024-02-29 PROCEDURE — 2780000003 HC OR 278 NO HCPCS

## 2024-02-29 PROCEDURE — 76937 US GUIDE VASCULAR ACCESS: CPT | Performed by: RADIOLOGY

## 2024-02-29 PROCEDURE — 7100000010 HC PHASE TWO TIME - EACH INCREMENTAL 1 MINUTE

## 2024-02-29 PROCEDURE — 8010000001 HC DIALYSIS - HEMODIALYSIS PER DAY

## 2024-02-29 PROCEDURE — 1100000001 HC PRIVATE ROOM DAILY

## 2024-02-29 RX ORDER — LIDOCAINE HYDROCHLORIDE 10 MG/ML
INJECTION, SOLUTION EPIDURAL; INFILTRATION; INTRACAUDAL; PERINEURAL
Status: COMPLETED | OUTPATIENT
Start: 2024-02-29 | End: 2024-02-29

## 2024-02-29 RX ORDER — MIDAZOLAM HYDROCHLORIDE 1 MG/ML
INJECTION, SOLUTION INTRAMUSCULAR; INTRAVENOUS
Status: COMPLETED | OUTPATIENT
Start: 2024-02-29 | End: 2024-02-29

## 2024-02-29 RX ADMIN — SEVELAMER CARBONATE 800 MG: 800 TABLET, FILM COATED ORAL at 16:57

## 2024-02-29 RX ADMIN — PRAVASTATIN SODIUM 40 MG: 40 TABLET ORAL at 20:26

## 2024-02-29 RX ADMIN — LINEZOLID 600 MG: 600 TABLET, FILM COATED ORAL at 20:26

## 2024-02-29 RX ADMIN — PIOGLITAZONE HYDROCHLORIDE 15 MG: 15 TABLET ORAL at 09:33

## 2024-02-29 RX ADMIN — MIDAZOLAM 1 MG: 1 INJECTION INTRAMUSCULAR; INTRAVENOUS at 11:57

## 2024-02-29 RX ADMIN — ASPIRIN 81 MG: 81 TABLET, COATED ORAL at 09:33

## 2024-02-29 RX ADMIN — LIDOCAINE HYDROCHLORIDE 5 ML: 10 INJECTION, SOLUTION EPIDURAL; INFILTRATION; INTRACAUDAL; PERINEURAL at 11:57

## 2024-02-29 RX ADMIN — METOPROLOL TARTRATE 25 MG: 25 TABLET, FILM COATED ORAL at 09:33

## 2024-02-29 RX ADMIN — AMLODIPINE BESYLATE 5 MG: 5 TABLET ORAL at 09:33

## 2024-02-29 RX ADMIN — LINEZOLID 600 MG: 600 TABLET, FILM COATED ORAL at 09:33

## 2024-02-29 RX ADMIN — SITAGLIPTIN 25 MG: 50 TABLET, FILM COATED ORAL at 09:32

## 2024-02-29 RX ADMIN — Medication 2000 UNITS: at 09:33

## 2024-02-29 RX ADMIN — ALLOPURINOL 100 MG: 100 TABLET ORAL at 09:33

## 2024-02-29 RX ADMIN — LIDOCAINE HYDROCHLORIDE 5 ML: 10 INJECTION, SOLUTION EPIDURAL; INFILTRATION; INTRACAUDAL; PERINEURAL at 12:00

## 2024-02-29 RX ADMIN — CYANOCOBALAMIN TAB 1000 MCG 1000 MCG: 1000 TAB at 09:33

## 2024-02-29 RX ADMIN — HEPARIN SODIUM 5000 UNITS: 5000 INJECTION INTRAVENOUS; SUBCUTANEOUS at 20:00

## 2024-02-29 RX ADMIN — PREGABALIN 75 MG: 75 CAPSULE ORAL at 16:57

## 2024-02-29 ASSESSMENT — PAIN SCALES - GENERAL
PAINLEVEL_OUTOF10: 0 - NO PAIN

## 2024-02-29 ASSESSMENT — PAIN - FUNCTIONAL ASSESSMENT: PAIN_FUNCTIONAL_ASSESSMENT: NO/DENIES PAIN

## 2024-02-29 NOTE — PRE-PROCEDURE NOTE
Report from Sending RN:    Report From: Silvia Edwards  Recent Surgery of Procedure: Yes, CVC placed 2/29/24  Baseline Level of Consciousness (LOC): A&O x3  Oxygen Use: No  Type:   Diabetic: YES  Last BP Med Given Day of Dialysis: SEE MAR  Last Pain Med Given: SEE MAR  Lab Tests to be Obtained with Dialysis: No  Blood Transfusion to be Given During Dialysis: No  Available IV Access: Yes  Medications to be Administered During Dialysis: No  Continuous IV Infusion Running: No  Restraints on Currently or in the Last 24 Hours: No  Hand-Off Communication: VINCENT DANGELOT

## 2024-02-29 NOTE — PROGRESS NOTES
Patient is confirmed with a chair time at [facility] M/W/F @ 1215 pm. She can begin as soon as Monday, March 4th.     1130: [facility] is able to transport patient to HD at [facility] in [place]. I spoke with Garima at [facility] and there is a M/W/F 1:15 pm chair available in [place]. They will divert patient to the [place] location until she returns home from SNF.

## 2024-02-29 NOTE — INTERVAL H&P NOTE
H&P reviewed. The patient was examined and there are no changes to the H&P.    Impression: ESTEE on CKD    Plan: Tunneled HD catheter placement

## 2024-02-29 NOTE — PROGRESS NOTES
Physical Therapy                 Therapy Communication Note    Patient Name: Tana Hargrove  MRN: [id number]  Today's Date: 2/29/2024     Discipline: Physical Therapy    Missed Visit Reason: Missed Visit Reason:  (dialysis)    Missed Time: Attempt    Comment:

## 2024-02-29 NOTE — PROGRESS NOTES
"      Nephrology Progress Note      Nephrology following for ESTEE on CKD IV.     Events over night:   No acute events overnight.  She is feeling better this morning, seen resting comfortably.  Awaiting placement of TDC today.    /72 (BP Location: Right arm, Patient Position: Lying)   Pulse 74   Temp 37.2 °C (99 °F) (Temporal)   Resp 18   Ht 1.549 m (5' 1\")   Wt 72 kg (158 lb 11.7 oz)   SpO2 93%   BMI 29.99 kg/m²     Input / Output:  24 HR:   Intake/Output Summary (Last 24 hours) at 2/29/2024 0929  Last data filed at 2/29/2024 0700  Gross per 24 hour   Intake 540 ml   Output 250 ml   Net 290 ml         Physical Exam   Alert and oriented x 3 NAD  Neck: no JVD  CV: RRR  Lungs: CTA bilaterally  Abd: soft, NT, ND   Ext: minimal lower extremity edema    Scheduled medications  allopurinol, 100 mg, oral, Daily  amLODIPine, 5 mg, oral, Daily  aspirin, 81 mg, oral, Daily  cholecalciferol, 2,000 Units, oral, Daily  cyanocobalamin, 1,000 mcg, oral, Daily  heparin (porcine), 5,000 Units, subcutaneous, q8h  insulin lispro, 0-5 Units, subcutaneous, TID with meals  linezolid, 600 mg, oral, BID  metoprolol tartrate, 25 mg, oral, Daily  pioglitazone, 15 mg, oral, Daily  pravastatin, 40 mg, oral, Nightly  pregabalin, 75 mg, oral, Every other day  sevelamer carbonate, 800 mg, oral, TID with meals  SITagliptin phosphate, 25 mg, oral, Daily      Continuous medications       PRN medications  PRN medications: acetaminophen, colchicine, dextrose 10 % in water (D10W), dextrose, glucagon, meclizine, sennosides-docusate sodium   Results from last 7 days   Lab Units 02/28/24  0548 02/27/24  0318   SODIUM mmol/L 139 136   POTASSIUM mmol/L 5.3 5.4*   CHLORIDE mmol/L 113* 112*   CO2 mmol/L 20* 19*   BUN mg/dL 92* 96*   CREATININE mg/dL 3.47* 4.00*   CALCIUM mg/dL 9.6 8.5*   PROTEIN TOTAL g/dL  --  5.2*   BILIRUBIN TOTAL mg/dL  --  0.4   ALK PHOS U/L  --  38   ALT U/L  --  10   AST U/L  --  16   GLUCOSE mg/dL 110* 126*        Results " from last 7 days   Lab Units 02/25/24  2319   MAGNESIUM mg/dL 2.12        Results from last 7 days   Lab Units 02/28/24  0548 02/27/24  0318 02/26/24  0946   WBC AUTO x10*3/uL 3.8* 4.3* 3.7*   HEMOGLOBIN g/dL 8.0* 6.9* 6.8*   HEMATOCRIT % 24.3* 21.4* 21.7*   PLATELETS AUTO x10*3/uL 156 197 212          Assessment & Plan:     Assessment:   Patient is 78 y.o. female who is admitted to hospital for weakness. Nephrology consulted in view of ESTEE on CKD IV.     Acute kidney injury on CKD stage IV  - Patient does have a history of requiring dialysis in the past from Dec 2021 to March 2022  - Patient's creatinine has been worsening over past month, it was 3.07 on 2/18 upon previous discharge. Prior to that her baseline was around 2.6.  - Creatinine upon admission is 4.67,   - Fatigue and weakness may be secondary to uremia, patient experiencing some myoclonic jerks and asterixis as well  - UA with no evidence of infection, 1+ protein which is not new  - No recent hypotension or nephrotoxins  - Received 1L of IV fluids in the ED  - Creatinine down trending     Acute on chronic anemia  - Secondary to CKD and MDS  - Hb 7.1 --> 6.8 --> 6.9 (after 1 unit PRBC's)  - On high-dose Procrit weekly  - Receives intermittent transfusions as needed     Hyperkalemia  - mild  - no EKG changes     Hyperphosphatemia  - phosphate binder     Metabolic acidosis  - bicarb 16  - add bicarb to fluids  - improving     Volume status  - appears euvolemic  - minimal lower extremity swelling  - no shortness of breath, chest x-ray clear        Recommendations:   - Patient agreeable to starting dialysis and consent obtained yesterday  - TDC to be placed today by IR  - Will initiate dialysis  - Patient has outpatient chair in [place] starting on Monday March 4th  - Discussed pursuing AV fistula as soon as possible      Please message me through [employer] chat with any questions or concerns.     JORDAN Pascal IV  2/29/2024  9:29 AM     [facility]    [address], [address]   [place] [address]  Office: [phone]

## 2024-02-29 NOTE — PROGRESS NOTES
Occupational Therapy                 Therapy Communication Note    Patient Name: Tana Hargrove  MRN: [id number]  Today's Date: 2/29/2024     Discipline: Occupational Therapy    Missed Visit Reason: Missed Visit Reason:  (pt. in dialysis)    Missed Time: Attempt    Comment:

## 2024-02-29 NOTE — PROGRESS NOTES
Tana Hargrove is a 78 y.o. female on day 3 of admission presenting with Acute kidney injury (nontraumatic) (CMS/HCC).      Subjective   Tana Hargrove is a 78 y.o. female with PMHx of HTN, HLD, chronic diastolic heart failure, COPD (no baseline O2), CKD, NIDDM-II, MDS with chronic anemia requiring Procrit and intermittent PRBC transfusions, OA, GERD, who presents to the emergency room with generalized fatigue and weakness.  Patient was recently discharged about 1 week prior to presentation after treatment for urinary tract infection.  She has been feeling much better at home up until the day prior to presentation when she suddenly fell.  She describes it as slumping down as she was attempting to go to the bathroom while using the walker.  This was a second episode on that day.  She denies any chest pain abdominal pain fever chills nausea or vomiting she denies any diarrhea prior to this she has been taking her medications regularly.  She admits to eating well but not drinking enough because she was concerned about overloading herself with fluid as she was told by a primary doctor.        On admission in the emergency room she was hemodynamically stable with a blood pressure 118/54 mmHg, heart rate of 66/min, respiratory rate of 18/min, temperature 36.5 °C, and was saturating 95% on room air.  Initial BMP showed sodium of 134 with a chloride of 108, bicarbonate of 16, BUN/creatinine of 113/4.67.  CBC showed a hemoglobin of 7.1.  His CT of the head was done which showed no acute intracranial abnormality.  Chest x-ray showed no acute cardiopulmonary disease and a right foot x-ray showed no acute osseous abnormality as well with mild multifocal age-related osteoarthritis changes.  Urine analysis showed no signs of UTI.  Patient has been admitted for further evaluation and management.  2/27: Patient was seen and examined.  Still complaining of generalized weakness.  Hemoglobin is 6.9 today after 1 units of packed red  blood cell transfusion.  Likely related to hemodilution.  I will transfuse 1 more unit of packed red blood cell today.  Creatinine is improving down to 4.0 today.  Continue IV bicarbonate and nephrologist.  Nephrology is on board and recommendations appreciated.  Seen by PT OT who recommend extended-care facility however patient is adamantly refusing SNF placement.  Repeat CBC and BMP in AM.  2/28: Patient was seen and examined.  She is clinically better today.  Creatinine is around her baseline at 3.47 today.  Hemoglobin is 8.0 today.  Currently awaiting authorization for placement in an extended care facility.  2/29: Patient was seen and examined.  After extensive conversation with nephrology decision to start patient on hemodialysis was made.  Patient to get a temporary dialysis catheter today and to commence hemodialysis afterwards.  Repeat RFP in AM.       Objective     Last Recorded Vitals  /79 (BP Location: Right arm, Patient Position: Lying)   Pulse 62   Temp 36.6 °C (97.8 °F) (Temporal)   Resp 18   Wt 72 kg (158 lb 11.7 oz)   SpO2 94%   Intake/Output last 3 Shifts:    Intake/Output Summary (Last 24 hours) at 2/29/2024 1122  Last data filed at 2/29/2024 0900  Gross per 24 hour   Intake 120 ml   Output 250 ml   Net -130 ml         Admission Weight  Weight: 72 kg (158 lb 11.7 oz) (02/25/24 2204)    Daily Weight  02/25/24 : 72 kg (158 lb 11.7 oz)    Image Results  ECG 12 lead  Sinus rhythm  CT head wo IV contrast  Narrative: Interpreted By:  Katherin Díaz,   STUDY:  CT HEAD WO IV CONTRAST;  2/26/2024 1:35 am      INDICATION:  Signs/Symptoms:double vision.      COMPARISON:  08/11/2023      ACCESSION NUMBER(S):  [id number]      ORDERING CLINICIAN:  IVAN EARL      TECHNIQUE:  Axial noncontrast CT images of the head.      FINDINGS:  BRAIN PARENCHYMA:  Gray-white matter interfaces are preserved. No  mass effect or midline shift.      HEMORRHAGE: No acute intracranial hemorrhage.  VENTRICLES and  EXTRA-AXIAL SPACES: The ventricles and sulci are  within normal limits in size for brain volume. No abnormal extraaxial  fluid collection. EXTRACRANIAL SOFT TISSUES: Within normal limits.  PARANASAL SINUSES/MASTOIDS:  The visualized paranasal sinuses and  mastoid air cells are aerated. CALVARIUM: No depressed skull  fracture. No destructive osseous lesion.      OTHER FINDINGS: None.      Impression: No acute intracranial abnormality.          MACRO:  None      Signed by: Katherin Díaz 2/26/2024 2:14 AM  Dictation workstation:   [id number]  XR foot right 3+ views  Narrative: STUDY:  Foot Radiographs; 2/26/2024 00:08  INDICATION:  Foot wound, rule out osteo.  COMPARISON:  2/14/2024 XR Foot Right.  ACCESSION NUMBER(S):  [id number]  ORDERING CLINICIAN:  IVAN EARL  TECHNIQUE:  Three view(s) of the right foot.  FINDINGS:    There is no displaced fracture.  Mild hallux valgus configuration of  the great toe is noted.  There is mild joint space narrowing in the  interphalangeal joints as well as the first tarsal metatarsal joint  and first MTP joint.  Small Achilles enthesophyte and small plantar  calcaneal spur noted.  Peripheral vascular consultations are seen in  the ankle.  No soft tissue abnormality is seen.  Impression: No acute osseous abnormality identified.  Mild multifocal age-related  osteoarthritic changes as described.  Signed by Claudio Martínez  XR chest 1 view  Narrative: STUDY:  Chest Radiograph;  2/26/2024  INDICATION:  Weakness.  COMPARISON:  12/14/2024 XR Chest  ACCESSION NUMBER(S):  [id number]  ORDERING CLINICIAN:  IVAN EARL  TECHNIQUE:  Frontal chest was obtained at 00:08 hours.  FINDINGS:  CARDIOMEDIASTINAL SILHOUETTE:  Cardiomediastinal silhouette is normal in size and configuration.     LUNGS:  Lungs are clear.  Elevated right hemidiaphragm is noted.     ABDOMEN:  No remarkable upper abdominal findings.     BONES:  No acute osseous changes.  Impression: No acute cardiopulmonary  disease.  Signed by Claudio Martínez      Physical Exam  Constitutional:       General: She is not in acute distress.     Appearance: Normal appearance. She is not ill-appearing or diaphoretic.   HENT:      Head: Normocephalic and atraumatic.      Mouth/Throat:      Mouth: Mucous membranes are moist.      Pharynx: Oropharynx is clear.   Eyes:      Extraocular Movements: Extraocular movements intact.      Conjunctiva/sclera: Conjunctivae normal.      Pupils: Pupils are equal, round, and reactive to light.   Neck:      Vascular: No carotid bruit.   Cardiovascular:      Rate and Rhythm: Normal rate and regular rhythm.      Pulses: Normal pulses.      Heart sounds: Murmur heard.      Comments: Faint ejection murmur heard best at the left lower sternal border  Pulmonary:      Comments: Diminished breath sounds are all lung fields  Abdominal:      General: Abdomen is flat. Bowel sounds are normal. There is no distension.      Palpations: Abdomen is soft. There is no mass.      Tenderness: There is no abdominal tenderness. There is no guarding or rebound.   Musculoskeletal:      Cervical back: Normal range of motion and neck supple. No rigidity or tenderness.      Right lower leg: Edema present.      Left lower leg: Edema present.      Comments: Trace pitting edema of bilateral lower extremities Skin:     General: Skin is warm and dry.      Capillary Refill: Capillary refill takes less than 2 seconds.      Findings:  No bruising or rash.      Comments:   Neurological:      Mental Status: She is alert and oriented to person, place, and time.      Cranial Nerves: No cranial nerve deficit.      Sensory: Sensory deficit present.      Motor: No weakness.      Coordination: Coordination normal.      Gait: Gait normal.      Comments: AOx3,    Psychiatric:         Mood and Affect: Mood normal.         Behavior: Behavior normal.         Thought Content: Thought content normal.         Judgment: Judgment normal.   Relevant  Results               Assessment/Plan                  Principal Problem:    Acute kidney injury (nontraumatic) (CMS/[facility])    #ESTEE on CKD IV  Improved  -Temporary dialysis catheter placement  -To start hemodialysis per nephrologist recommendation today  -Trend renal function  -Nephrologist on board     #Acute on chronic anemia  Likely related to MDS and anemia of chronic disease from worsening renal failure  Status post 1 unit of packed red blood cell transfusion.  I will transfuse 1 more unit  Continue Procrit per nephrology  Trend CBC daily        #Chronic diastolic heart failure   -Resume goal-directed medical therapy  -Hold diuretics for now  -      #HTN,   -Resume antihypertensives with parameters     Hyperlipidemia  Resume statins     #COPD without acute exacerbation   -Continue home therapies      #NIDDM-II   -Accucheks ACHS  -Insulin sliding scale  -Hemoglobin A1c in a.m.-        #Generalized weakness, acute on chronic deconditioning   -Likely 2/2 above   -Continue management  -PT/OT/CM consults appreciated      #DVT prophylaxis  Subcutaneous heparin             Spent 35 minutes in the follow-up management of this patient today    Naga Carr MD

## 2024-02-29 NOTE — CARE PLAN
Problem: Skin  Goal: Participates in plan/prevention/treatment measures  Outcome: Progressing  Goal: Promote/optimize nutrition  Outcome: Progressing  Goal: Promote skin healing  Outcome: Progressing  Flowsheets (Taken 2/28/2024 2118)  Promote skin healing: Turn/reposition every 2 hours/use positioning/transfer devices     Problem: Fall/Injury  Goal: Not fall by end of shift  Outcome: Progressing  Goal: Be free from injury by end of the shift  Outcome: Progressing  Goal: Verbalize understanding of personal risk factors for fall in the hospital  Outcome: Progressing  Goal: Verbalize understanding of risk factor reduction measures to prevent injury from fall in the home  Outcome: Progressing  Goal: Use assistive devices by end of the shift  Outcome: Progressing  Goal: Pace activities to prevent fatigue by end of the shift  Outcome: Progressing     Problem: Pain  Goal: Takes deep breaths with improved pain control throughout the shift  Outcome: Progressing  Goal: Turns in bed with improved pain control throughout the shift  Outcome: Progressing  Goal: Walks with improved pain control throughout the shift  Outcome: Progressing  Goal: Performs ADL's with improved pain control throughout shift  Outcome: Progressing  Goal: Participates in PT with improved pain control throughout the shift  Outcome: Progressing  Goal: Free from opioid side effects throughout the shift  Outcome: Progressing  Goal: Free from acute confusion related to pain meds throughout the shift  Outcome: Progressing     Problem: Pain - Adult  Goal: Verbalizes/displays adequate comfort level or baseline comfort level  Outcome: Progressing     Problem: Safety - Adult  Goal: Free from fall injury  Outcome: Progressing     Problem: Discharge Planning  Goal: Discharge to home or other facility with appropriate resources  Outcome: Progressing     Problem: Chronic Conditions and Co-morbidities  Goal: Patient's chronic conditions and co-morbidity symptoms are  monitored and maintained or improved  Outcome: Progressing     Problem: Diabetes  Goal: Achieve decreasing blood glucose levels by end of shift  Outcome: Progressing  Goal: Increase stability of blood glucose readings by end of shift  Outcome: Progressing  Goal: Decrease in ketones present in urine by end of shift  Outcome: Progressing  Goal: Maintain electrolyte levels within acceptable range throughout shift  Outcome: Progressing  Goal: Maintain glucose levels >70mg/dl to <250mg/dl throughout shift  Outcome: Progressing  Goal: No changes in neurological exam by end of shift  Outcome: Progressing  Goal: Learn about and adhere to nutrition recommendations by end of shift  Outcome: Progressing  Goal: Vital signs within normal range for age by end of shift  Outcome: Progressing  Goal: Increase self care and/or family involovement by end of shift  Outcome: Progressing  Goal: Receive DSME education by end of shift  Outcome: Progressing

## 2024-02-29 NOTE — CARE PLAN
The patient's goals for the shift include      The clinical goals for the shift include Pt. will remain free from falls and injuries throughout the shift.      Problem: Skin  Goal: Participates in plan/prevention/treatment measures  Outcome: Progressing  Flowsheets (Taken 2/29/2024 1711)  Participates in plan/prevention/treatment measures: Elevate heels  Goal: Promote/optimize nutrition  Outcome: Progressing  Flowsheets (Taken 2/29/2024 1711)  Promote/optimize nutrition: Monitor/record intake including meals  Goal: Promote skin healing  Outcome: Progressing  Flowsheets (Taken 2/29/2024 1711)  Promote skin healing: Turn/reposition every 2 hours/use positioning/transfer devices     Problem: Fall/Injury  Goal: Not fall by end of shift  Outcome: Progressing  Goal: Be free from injury by end of the shift  Outcome: Progressing  Goal: Verbalize understanding of personal risk factors for fall in the hospital  Outcome: Progressing  Goal: Verbalize understanding of risk factor reduction measures to prevent injury from fall in the home  Outcome: Progressing  Goal: Use assistive devices by end of the shift  Outcome: Progressing  Goal: Pace activities to prevent fatigue by end of the shift  Outcome: Progressing     Problem: Pain  Goal: Takes deep breaths with improved pain control throughout the shift  Outcome: Progressing  Goal: Turns in bed with improved pain control throughout the shift  Outcome: Progressing  Goal: Walks with improved pain control throughout the shift  Outcome: Progressing  Goal: Performs ADL's with improved pain control throughout shift  Outcome: Progressing  Goal: Participates in PT with improved pain control throughout the shift  Outcome: Progressing  Goal: Free from opioid side effects throughout the shift  Outcome: Progressing  Goal: Free from acute confusion related to pain meds throughout the shift  Outcome: Progressing     Problem: Pain - Adult  Goal: Verbalizes/displays adequate comfort level or  baseline comfort level  Outcome: Progressing     Problem: Safety - Adult  Goal: Free from fall injury  Outcome: Progressing     Problem: Discharge Planning  Goal: Discharge to home or other facility with appropriate resources  Outcome: Progressing     Problem: Chronic Conditions and Co-morbidities  Goal: Patient's chronic conditions and co-morbidity symptoms are monitored and maintained or improved  Outcome: Progressing     Problem: Diabetes  Goal: Achieve decreasing blood glucose levels by end of shift  Outcome: Progressing  Goal: Increase stability of blood glucose readings by end of shift  Outcome: Progressing  Goal: Decrease in ketones present in urine by end of shift  Outcome: Progressing  Goal: Maintain electrolyte levels within acceptable range throughout shift  Outcome: Progressing  Goal: Maintain glucose levels >70mg/dl to <250mg/dl throughout shift  Outcome: Progressing  Goal: No changes in neurological exam by end of shift  Outcome: Progressing  Goal: Learn about and adhere to nutrition recommendations by end of shift  Outcome: Progressing  Goal: Vital signs within normal range for age by end of shift  Outcome: Progressing  Goal: Increase self care and/or family involovement by end of shift  Outcome: Progressing  Goal: Receive DSME education by end of shift  Outcome: Progressing

## 2024-02-29 NOTE — POST-PROCEDURE NOTE
Interventional Radiology Brief Postprocedure Note    Attending: Bismark Crandall MD      Assistant: none    Diagnosis: renal failure    Description of procedure: tdc placement     Anesthesia:  Local    Complications: None    Estimated Blood Loss: minimal        See detailed result report with images in PACS.    The patient tolerated the procedure well without incident or complication and is in stable condition.

## 2024-02-29 NOTE — POST-PROCEDURE NOTE
Report to Receiving RN:    Report To: Jackelyn Montes  Time Report Called: 9910  Hand-Off Communication: Last Bp , NO fluid removed, pt tolerated tx well vss  Complications During Treatment: No  Ultrafiltration Treatment: No  Medications Administered During Dialysis: No  Blood Products Administered During Dialysis: No  Labs Sent During Dialysis: No  Heparin Drip Rate Changes: No    Electronic Signatures:    Last Updated: 4:09 PM by VINCENT AVALOS

## 2024-03-01 ENCOUNTER — PATIENT OUTREACH (OUTPATIENT)
Dept: CARE COORDINATION | Facility: CLINIC | Age: 79
End: 2024-03-01

## 2024-03-01 ENCOUNTER — APPOINTMENT (OUTPATIENT)
Dept: DIALYSIS | Facility: HOSPITAL | Age: 79
End: 2024-03-01
Payer: MEDICARE

## 2024-03-01 LAB
ALBUMIN SERPL BCP-MCNC: 3.3 G/DL (ref 3.4–5)
ANION GAP SERPL CALC-SCNC: 12 MMOL/L (ref 10–20)
BACTERIA SPEC CULT: ABNORMAL
BUN SERPL-MCNC: 45 MG/DL (ref 6–23)
CALCIUM SERPL-MCNC: 9.1 MG/DL (ref 8.6–10.3)
CHLORIDE SERPL-SCNC: 105 MMOL/L (ref 98–107)
CO2 SERPL-SCNC: 27 MMOL/L (ref 21–32)
CREAT SERPL-MCNC: 2.26 MG/DL (ref 0.5–1.05)
EGFRCR SERPLBLD CKD-EPI 2021: 22 ML/MIN/1.73M*2
ERYTHROCYTE [DISTWIDTH] IN BLOOD BY AUTOMATED COUNT: 20.6 % (ref 11.5–14.5)
GLUCOSE BLD MANUAL STRIP-MCNC: 101 MG/DL (ref 74–99)
GLUCOSE BLD MANUAL STRIP-MCNC: 134 MG/DL (ref 74–99)
GLUCOSE BLD MANUAL STRIP-MCNC: 141 MG/DL (ref 74–99)
GLUCOSE BLD MANUAL STRIP-MCNC: 152 MG/DL (ref 74–99)
GLUCOSE SERPL-MCNC: 92 MG/DL (ref 74–99)
GRAM STN SPEC: ABNORMAL
GRAM STN SPEC: ABNORMAL
HCT VFR BLD AUTO: 24.6 % (ref 36–46)
HGB BLD-MCNC: 7.8 G/DL (ref 12–16)
MCH RBC QN AUTO: 29.7 PG (ref 26–34)
MCHC RBC AUTO-ENTMCNC: 31.7 G/DL (ref 32–36)
MCV RBC AUTO: 94 FL (ref 80–100)
NRBC BLD-RTO: 0 /100 WBCS (ref 0–0)
PHOSPHATE SERPL-MCNC: 3.3 MG/DL (ref 2.5–4.9)
PLATELET # BLD AUTO: 115 X10*3/UL (ref 150–450)
POTASSIUM SERPL-SCNC: 4.7 MMOL/L (ref 3.5–5.3)
RBC # BLD AUTO: 2.63 X10*6/UL (ref 4–5.2)
SODIUM SERPL-SCNC: 139 MMOL/L (ref 136–145)
WBC # BLD AUTO: 3.6 X10*3/UL (ref 4.4–11.3)

## 2024-03-01 PROCEDURE — 99233 SBSQ HOSP IP/OBS HIGH 50: CPT | Performed by: INTERNAL MEDICINE

## 2024-03-01 PROCEDURE — 82947 ASSAY GLUCOSE BLOOD QUANT: CPT

## 2024-03-01 PROCEDURE — 8010000001 HC DIALYSIS - HEMODIALYSIS PER DAY

## 2024-03-01 PROCEDURE — 36415 COLL VENOUS BLD VENIPUNCTURE: CPT | Performed by: INTERNAL MEDICINE

## 2024-03-01 PROCEDURE — 80069 RENAL FUNCTION PANEL: CPT | Performed by: INTERNAL MEDICINE

## 2024-03-01 PROCEDURE — 2500000002 HC RX 250 W HCPCS SELF ADMINISTERED DRUGS (ALT 637 FOR MEDICARE OP, ALT 636 FOR OP/ED): Performed by: INTERNAL MEDICINE

## 2024-03-01 PROCEDURE — 1090000001 HH PPS REVENUE CREDIT

## 2024-03-01 PROCEDURE — 2500000005 HC RX 250 GENERAL PHARMACY W/O HCPCS: Performed by: INTERNAL MEDICINE

## 2024-03-01 PROCEDURE — 2500000004 HC RX 250 GENERAL PHARMACY W/ HCPCS (ALT 636 FOR OP/ED): Performed by: INTERNAL MEDICINE

## 2024-03-01 PROCEDURE — 1100000001 HC PRIVATE ROOM DAILY

## 2024-03-01 PROCEDURE — 2500000001 HC RX 250 WO HCPCS SELF ADMINISTERED DRUGS (ALT 637 FOR MEDICARE OP): Performed by: INTERNAL MEDICINE

## 2024-03-01 PROCEDURE — 87081 CULTURE SCREEN ONLY: CPT | Mod: PORLAB | Performed by: NURSE PRACTITIONER

## 2024-03-01 PROCEDURE — 1090000002 HH PPS REVENUE DEBIT

## 2024-03-01 PROCEDURE — 85027 COMPLETE CBC AUTOMATED: CPT | Performed by: INTERNAL MEDICINE

## 2024-03-01 RX ORDER — OXYCODONE HYDROCHLORIDE 5 MG/1
5 TABLET ORAL ONCE
Status: COMPLETED | OUTPATIENT
Start: 2024-03-02 | End: 2024-03-01

## 2024-03-01 RX ORDER — ONDANSETRON HYDROCHLORIDE 2 MG/ML
4 INJECTION, SOLUTION INTRAVENOUS EVERY 6 HOURS PRN
Status: DISCONTINUED | OUTPATIENT
Start: 2024-03-01 | End: 2024-03-05 | Stop reason: HOSPADM

## 2024-03-01 RX ORDER — HEPARIN SODIUM 1000 [USP'U]/ML
2000 INJECTION, SOLUTION INTRAVENOUS; SUBCUTANEOUS
Status: DISCONTINUED | OUTPATIENT
Start: 2024-03-01 | End: 2024-03-05 | Stop reason: HOSPADM

## 2024-03-01 RX ORDER — ACETAMINOPHEN 650 MG/1
650 SUPPOSITORY RECTAL EVERY 4 HOURS PRN
Status: DISCONTINUED | OUTPATIENT
Start: 2024-03-01 | End: 2024-03-05 | Stop reason: HOSPADM

## 2024-03-01 RX ADMIN — ALLOPURINOL 100 MG: 100 TABLET ORAL at 13:02

## 2024-03-01 RX ADMIN — ONDANSETRON 4 MG: 2 INJECTION INTRAMUSCULAR; INTRAVENOUS at 23:05

## 2024-03-01 RX ADMIN — ONDANSETRON 4 MG: 2 INJECTION INTRAMUSCULAR; INTRAVENOUS at 17:17

## 2024-03-01 RX ADMIN — LINEZOLID 600 MG: 600 TABLET, FILM COATED ORAL at 13:07

## 2024-03-01 RX ADMIN — SITAGLIPTIN 25 MG: 50 TABLET, FILM COATED ORAL at 13:02

## 2024-03-01 RX ADMIN — ACETAMINOPHEN 487.5 MG: 325 TABLET ORAL at 02:20

## 2024-03-01 RX ADMIN — PIOGLITAZONE HYDROCHLORIDE 15 MG: 15 TABLET ORAL at 13:07

## 2024-03-01 RX ADMIN — ASPIRIN 81 MG: 81 TABLET, COATED ORAL at 13:01

## 2024-03-01 RX ADMIN — Medication 2 EACH: at 22:45

## 2024-03-01 RX ADMIN — METOPROLOL TARTRATE 25 MG: 25 TABLET, FILM COATED ORAL at 13:02

## 2024-03-01 RX ADMIN — Medication 2000 UNITS: at 13:01

## 2024-03-01 RX ADMIN — HEPARIN SODIUM 5000 UNITS: 5000 INJECTION INTRAVENOUS; SUBCUTANEOUS at 02:20

## 2024-03-01 RX ADMIN — PRAVASTATIN SODIUM 40 MG: 40 TABLET ORAL at 20:20

## 2024-03-01 RX ADMIN — INSULIN LISPRO 1 UNITS: 100 INJECTION, SOLUTION INTRAVENOUS; SUBCUTANEOUS at 17:17

## 2024-03-01 RX ADMIN — OXYCODONE HYDROCHLORIDE 5 MG: 5 TABLET ORAL at 23:48

## 2024-03-01 RX ADMIN — Medication 1 EACH: at 20:15

## 2024-03-01 RX ADMIN — HEPARIN SODIUM 1800 UNITS: 1000 INJECTION INTRAVENOUS; SUBCUTANEOUS at 11:59

## 2024-03-01 RX ADMIN — SEVELAMER CARBONATE 800 MG: 800 TABLET, FILM COATED ORAL at 17:17

## 2024-03-01 RX ADMIN — LINEZOLID 600 MG: 600 TABLET, FILM COATED ORAL at 20:20

## 2024-03-01 RX ADMIN — ACETAMINOPHEN 487.5 MG: 325 TABLET ORAL at 20:20

## 2024-03-01 RX ADMIN — HEPARIN SODIUM 1800 UNITS: 1000 INJECTION INTRAVENOUS; SUBCUTANEOUS at 11:57

## 2024-03-01 RX ADMIN — AMLODIPINE BESYLATE 5 MG: 5 TABLET ORAL at 13:01

## 2024-03-01 RX ADMIN — SEVELAMER CARBONATE 800 MG: 800 TABLET, FILM COATED ORAL at 13:00

## 2024-03-01 RX ADMIN — CYANOCOBALAMIN TAB 1000 MCG 1000 MCG: 1000 TAB at 13:01

## 2024-03-01 ASSESSMENT — COGNITIVE AND FUNCTIONAL STATUS - GENERAL
TURNING FROM BACK TO SIDE WHILE IN FLAT BAD: A LITTLE
MOVING TO AND FROM BED TO CHAIR: A LITTLE
MOBILITY SCORE: 14
WALKING IN HOSPITAL ROOM: A LOT
TURNING FROM BACK TO SIDE WHILE IN FLAT BAD: A LITTLE
MOVING FROM LYING ON BACK TO SITTING ON SIDE OF FLAT BED WITH BEDRAILS: A LITTLE
MOVING FROM LYING ON BACK TO SITTING ON SIDE OF FLAT BED WITH BEDRAILS: A LITTLE
DRESSING REGULAR LOWER BODY CLOTHING: A LOT
CLIMB 3 TO 5 STEPS WITH RAILING: TOTAL
DRESSING REGULAR UPPER BODY CLOTHING: A LOT
CLIMB 3 TO 5 STEPS WITH RAILING: TOTAL
DAILY ACTIVITIY SCORE: 15
PERSONAL GROOMING: A LOT
DRESSING REGULAR LOWER BODY CLOTHING: A LOT
STANDING UP FROM CHAIR USING ARMS: A LOT
MOVING TO AND FROM BED TO CHAIR: A LITTLE
STANDING UP FROM CHAIR USING ARMS: A LOT
MOBILITY SCORE: 14
HELP NEEDED FOR BATHING: A LOT
TOILETING: A LOT
DRESSING REGULAR UPPER BODY CLOTHING: A LOT
DAILY ACTIVITIY SCORE: 14
PERSONAL GROOMING: A LITTLE
TOILETING: A LOT
HELP NEEDED FOR BATHING: A LOT
WALKING IN HOSPITAL ROOM: A LOT

## 2024-03-01 ASSESSMENT — PAIN - FUNCTIONAL ASSESSMENT
PAIN_FUNCTIONAL_ASSESSMENT: 0-10

## 2024-03-01 ASSESSMENT — PAIN SCALES - GENERAL
PAINLEVEL_OUTOF10: 6
PAINLEVEL_OUTOF10: 0 - NO PAIN
PAINLEVEL_OUTOF10: 0 - NO PAIN
PAINLEVEL_OUTOF10: 3
PAINLEVEL_OUTOF10: 6

## 2024-03-01 NOTE — PROGRESS NOTES
Unable to reach patient for call back after patient's follow up appointment with PCP.   ANGIE with call back number for patient to call if needed   If no voicemail available call attempts x 2 were made to contact the patient to assist with any questions or concerns patient may have.    Patient was readmitted

## 2024-03-01 NOTE — PROCEDURES
Report from Sending RN:    Report From: Silvia Edwards RN  Recent Surgery of Procedure: Yes, CVC 2/29/2024  Baseline Level of Consciousness (LOC): x4  Oxygen Use: No  Type: RA  Diabetic: No  Last BP Med Given Day of Dialysis: See MAR  Last Pain Med Given: See MAR  Lab Tests to be Obtained with Dialysis: No  Blood Transfusion to be Given During Dialysis: No  Available IV Access: Yes  Medications to be Administered During Dialysis: No  Continuous IV Infusion Running: No  Restraints on Currently or in the Last 24 Hours: No  Hand-Off Communication:

## 2024-03-01 NOTE — CARE PLAN
The patient's goals for the shift include      The clinical goals for the shift include Patient will remain safe this shift      Problem: Skin  Goal: Participates in plan/prevention/treatment measures  Outcome: Progressing  Flowsheets (Taken 3/1/2024 1220)  Participates in plan/prevention/treatment measures: Elevate heels  Goal: Promote/optimize nutrition  Outcome: Progressing  Flowsheets (Taken 3/1/2024 1220)  Promote/optimize nutrition: Monitor/record intake including meals  Goal: Promote skin healing  Outcome: Progressing  Flowsheets (Taken 3/1/2024 1220)  Promote skin healing: Turn/reposition every 2 hours/use positioning/transfer devices     Problem: Fall/Injury  Goal: Not fall by end of shift  Outcome: Progressing  Goal: Be free from injury by end of the shift  Outcome: Progressing  Goal: Verbalize understanding of personal risk factors for fall in the hospital  Outcome: Progressing  Goal: Verbalize understanding of risk factor reduction measures to prevent injury from fall in the home  Outcome: Progressing  Goal: Use assistive devices by end of the shift  Outcome: Progressing  Goal: Pace activities to prevent fatigue by end of the shift  Outcome: Progressing     Problem: Pain  Goal: Takes deep breaths with improved pain control throughout the shift  Outcome: Progressing  Goal: Turns in bed with improved pain control throughout the shift  Outcome: Progressing  Goal: Walks with improved pain control throughout the shift  Outcome: Progressing  Goal: Performs ADL's with improved pain control throughout shift  Outcome: Progressing  Goal: Participates in PT with improved pain control throughout the shift  Outcome: Progressing  Goal: Free from opioid side effects throughout the shift  Outcome: Progressing  Goal: Free from acute confusion related to pain meds throughout the shift  Outcome: Progressing     Problem: Pain - Adult  Goal: Verbalizes/displays adequate comfort level or baseline comfort level  Outcome:  Progressing     Problem: Safety - Adult  Goal: Free from fall injury  Outcome: Progressing     Problem: Discharge Planning  Goal: Discharge to home or other facility with appropriate resources  Outcome: Progressing     Problem: Chronic Conditions and Co-morbidities  Goal: Patient's chronic conditions and co-morbidity symptoms are monitored and maintained or improved  Outcome: Progressing     Problem: Diabetes  Goal: Achieve decreasing blood glucose levels by end of shift  Outcome: Progressing  Goal: Increase stability of blood glucose readings by end of shift  Outcome: Progressing  Goal: Decrease in ketones present in urine by end of shift  Outcome: Progressing  Goal: Maintain electrolyte levels within acceptable range throughout shift  Outcome: Progressing  Goal: Maintain glucose levels >70mg/dl to <250mg/dl throughout shift  Outcome: Progressing  Goal: No changes in neurological exam by end of shift  Outcome: Progressing  Goal: Learn about and adhere to nutrition recommendations by end of shift  Outcome: Progressing  Goal: Vital signs within normal range for age by end of shift  Outcome: Progressing  Goal: Increase self care and/or family involovement by end of shift  Outcome: Progressing  Goal: Receive DSME education by end of shift  Outcome: Progressing

## 2024-03-01 NOTE — PROGRESS NOTES
Physical Therapy                 Therapy Communication Note    Patient Name: Tana Hargrove  MRN: [id number]  Today's Date: 3/1/2024     Discipline: Physical Therapy    Missed Visit Reason: Missed Visit Reason:  (patient at dialysis will try this afternnoon)    Missed Time: Attempt    Comment:zayda 13:19 patient back in room from diaylysis but declining therapy due to not feeling well and having emesis

## 2024-03-01 NOTE — CARE PLAN
Problem: Skin  Goal: Participates in plan/prevention/treatment measures  Outcome: Progressing  Goal: Promote/optimize nutrition  Outcome: Progressing  Goal: Promote skin healing  Outcome: Progressing  Flowsheets (Taken 2/29/2024 2029)  Promote skin healing: Turn/reposition every 2 hours/use positioning/transfer devices     Problem: Fall/Injury  Goal: Not fall by end of shift  Outcome: Progressing  Goal: Be free from injury by end of the shift  Outcome: Progressing  Goal: Verbalize understanding of personal risk factors for fall in the hospital  Outcome: Progressing  Goal: Verbalize understanding of risk factor reduction measures to prevent injury from fall in the home  Outcome: Progressing  Goal: Use assistive devices by end of the shift  Outcome: Progressing  Goal: Pace activities to prevent fatigue by end of the shift  Outcome: Progressing     Problem: Pain  Goal: Takes deep breaths with improved pain control throughout the shift  Outcome: Progressing  Goal: Turns in bed with improved pain control throughout the shift  Outcome: Progressing  Goal: Walks with improved pain control throughout the shift  Outcome: Progressing  Goal: Performs ADL's with improved pain control throughout shift  Outcome: Progressing  Goal: Participates in PT with improved pain control throughout the shift  Outcome: Progressing  Goal: Free from opioid side effects throughout the shift  Outcome: Progressing  Goal: Free from acute confusion related to pain meds throughout the shift  Outcome: Progressing     Problem: Pain - Adult  Goal: Verbalizes/displays adequate comfort level or baseline comfort level  Outcome: Progressing     Problem: Safety - Adult  Goal: Free from fall injury  Outcome: Progressing     Problem: Discharge Planning  Goal: Discharge to home or other facility with appropriate resources  Outcome: Progressing     Problem: Chronic Conditions and Co-morbidities  Goal: Patient's chronic conditions and co-morbidity symptoms are  monitored and maintained or improved  Outcome: Progressing     Problem: Diabetes  Goal: Achieve decreasing blood glucose levels by end of shift  Outcome: Progressing  Goal: Increase stability of blood glucose readings by end of shift  Outcome: Progressing  Goal: Decrease in ketones present in urine by end of shift  Outcome: Progressing  Goal: Maintain electrolyte levels within acceptable range throughout shift  Outcome: Progressing  Goal: Maintain glucose levels >70mg/dl to <250mg/dl throughout shift  Outcome: Progressing  Goal: No changes in neurological exam by end of shift  Outcome: Progressing  Goal: Learn about and adhere to nutrition recommendations by end of shift  Outcome: Progressing  Goal: Vital signs within normal range for age by end of shift  Outcome: Progressing  Goal: Increase self care and/or family involovement by end of shift  Outcome: Progressing  Goal: Receive DSME education by end of shift  Outcome: Progressing

## 2024-03-01 NOTE — PROGRESS NOTES
"      Nephrology Progress Note      Nephrology following for ESTEE on CKD IV.     Events over night:   Patient seen while getting dialysis this morning.  She feels tired as she did not sleep well last night.    /71 (Patient Position: Lying)   Pulse 71   Temp 36.2 °C (97.1 °F) (Temporal)   Resp 18   Ht 1.549 m (5' 0.98\")   Wt 72 kg (158 lb 11.7 oz)   SpO2 96%   BMI 30.01 kg/m²     Input / Output:  24 HR:   Intake/Output Summary (Last 24 hours) at 3/1/2024 0903  Last data filed at 3/1/2024 0500  Gross per 24 hour   Intake 1980 ml   Output 1000 ml   Net 980 ml         Physical Exam   Alert and oriented x 3 NAD  Neck: no JVD  CV: RRR  Lungs: CTA bilaterally  Abd: soft, NT, ND   Ext: minimal lower extremity edema    Scheduled medications  allopurinol, 100 mg, oral, Daily  amLODIPine, 5 mg, oral, Daily  aspirin, 81 mg, oral, Daily  cholecalciferol, 2,000 Units, oral, Daily  cyanocobalamin, 1,000 mcg, oral, Daily  heparin (porcine), 5,000 Units, subcutaneous, q8h  heparin, 2,000 Units, intra-catheter, After Dialysis  heparin, 2,000 Units, intra-catheter, After Dialysis  insulin lispro, 0-5 Units, subcutaneous, TID with meals  linezolid, 600 mg, oral, BID  metoprolol tartrate, 25 mg, oral, Daily  pioglitazone, 15 mg, oral, Daily  pravastatin, 40 mg, oral, Nightly  pregabalin, 75 mg, oral, Every other day  sevelamer carbonate, 800 mg, oral, TID with meals  SITagliptin phosphate, 25 mg, oral, Daily      Continuous medications       PRN medications  PRN medications: acetaminophen, colchicine, dextrose 10 % in water (D10W), dextrose, glucagon, meclizine, sennosides-docusate sodium   Results from last 7 days   Lab Units 03/01/24  0436 02/28/24  0548 02/27/24  0318   SODIUM mmol/L 139   < > 136   POTASSIUM mmol/L 4.7   < > 5.4*   CHLORIDE mmol/L 105   < > 112*   CO2 mmol/L 27   < > 19*   BUN mg/dL 45*   < > 96*   CREATININE mg/dL 2.26*   < > 4.00*   CALCIUM mg/dL 9.1   < > 8.5*   PROTEIN TOTAL g/dL  --   --  5.2* "   BILIRUBIN TOTAL mg/dL  --   --  0.4   ALK PHOS U/L  --   --  38   ALT U/L  --   --  10   AST U/L  --   --  16   GLUCOSE mg/dL 92   < > 126*    < > = values in this interval not displayed.        Results from last 7 days   Lab Units 02/25/24  2319   MAGNESIUM mg/dL 2.12        Results from last 7 days   Lab Units 03/01/24  0436 02/28/24  0548 02/27/24  0318   WBC AUTO x10*3/uL 3.6* 3.8* 4.3*   HEMOGLOBIN g/dL 7.8* 8.0* 6.9*   HEMATOCRIT % 24.6* 24.3* 21.4*   PLATELETS AUTO x10*3/uL 115* 156 197          Assessment & Plan:     Assessment:   Patient is 78 y.o. female who is admitted to hospital for weakness. Nephrology consulted in view of ESTEE on CKD IV.     Acute kidney injury on CKD stage IV  - Patient does have a history of requiring dialysis in the past from Dec 2021 to March 2022  - Patient's creatinine has been worsening over past month, it was 3.07 on 2/18 upon previous discharge. Prior to that her baseline was around 2.6.  - Creatinine upon admission is 4.67,   - Fatigue and weakness may be secondary to uremia, patient experiencing some myoclonic jerks and asterixis as well  - UA with no evidence of infection, 1+ protein which is not new  - No recent hypotension or nephrotoxins  - Received 1L of IV fluids in the ED  - Creatinine down trending     Acute on chronic anemia  - Secondary to CKD and MDS  - Hb 7.1 --> 6.8 --> 6.9 (after 1 unit PRBC's)  - On high-dose Procrit weekly  - Receives intermittent transfusions as needed     Hyperkalemia  - mild  - no EKG changes     Hyperphosphatemia  - phosphate binder     Metabolic acidosis  - bicarb 16  - add bicarb to fluids  - improving     Volume status  - appears euvolemic  - minimal lower extremity swelling  - no shortness of breath, chest x-ray clear        Recommendations:   - TDC placed and dialysis started yesterday  - Second round of dialysis this morning  - Patient has outpatient chair in [place] starting on Monday March 4th  - Discussed pursuing AV  fistula as soon as possible    Please message me through EPIC chat with any questions or concerns.     JORDAN Pascal IV  3/1/2024  9:03 AM     [facility]    [address], [address]   [place] [address]  Office: [phone]

## 2024-03-01 NOTE — PROGRESS NOTES
Tana Hargrove is a 78 y.o. female on day 4 of admission presenting with Acute kidney injury (nontraumatic) (CMS/HCC).      Subjective   Tana Hargrove is a 78 y.o. female with PMHx of HTN, HLD, chronic diastolic heart failure, COPD (no baseline O2), CKD, NIDDM-II, MDS with chronic anemia requiring Procrit and intermittent PRBC transfusions, OA, GERD, who presents to the emergency room with generalized fatigue and weakness.  Patient was recently discharged about 1 week prior to presentation after treatment for urinary tract infection.  She has been feeling much better at home up until the day prior to presentation when she suddenly fell.  She describes it as slumping down as she was attempting to go to the bathroom while using the walker.  This was a second episode on that day.  She denies any chest pain abdominal pain fever chills nausea or vomiting she denies any diarrhea prior to this she has been taking her medications regularly.  She admits to eating well but not drinking enough because she was concerned about overloading herself with fluid as she was told by a primary doctor.        On admission in the emergency room she was hemodynamically stable with a blood pressure 118/54 mmHg, heart rate of 66/min, respiratory rate of 18/min, temperature 36.5 °C, and was saturating 95% on room air.  Initial BMP showed sodium of 134 with a chloride of 108, bicarbonate of 16, BUN/creatinine of 113/4.67.  CBC showed a hemoglobin of 7.1.  His CT of the head was done which showed no acute intracranial abnormality.  Chest x-ray showed no acute cardiopulmonary disease and a right foot x-ray showed no acute osseous abnormality as well with mild multifocal age-related osteoarthritis changes.  Urine analysis showed no signs of UTI.  Patient has been admitted for further evaluation and management.  2/27: Patient was seen and examined.  Still complaining of generalized weakness.  Hemoglobin is 6.9 today after 1 units of packed red  blood cell transfusion.  Likely related to hemodilution.  I will transfuse 1 more unit of packed red blood cell today.  Creatinine is improving down to 4.0 today.  Continue IV bicarbonate and nephrologist.  Nephrology is on board and recommendations appreciated.  Seen by PT OT who recommend extended-care facility however patient is adamantly refusing SNF placement.  Repeat CBC and BMP in AM.  2/28: Patient was seen and examined.  She is clinically better today.  Creatinine is around her baseline at 3.47 today.  Hemoglobin is 8.0 today.  Currently awaiting authorization for placement in an extended care facility.  2/29: Patient was seen and examined.  After extensive conversation with nephrology decision to start patient on hemodialysis was made.  Patient to get a temporary dialysis catheter today and to commence hemodialysis afterwards.  Repeat RFP in AM.  3/1: Patient was seen and examined.  Tunneled dialysis catheter in situ and patient has had 2 sessions of hemodialysis so far.  Was complaining of chest pain at dialysis catheter site.  Catheter site does not look infected.  I will give as needed Tylenol for pain.  Trend RFP daily.       Objective     Last Recorded Vitals  /79 (BP Location: Right arm, Patient Position: Sitting)   Pulse 76   Temp 36.2 °C (97.2 °F) (Temporal)   Resp 16   Wt 72 kg (158 lb 11.7 oz)   SpO2 95%   Intake/Output last 3 Shifts:    Intake/Output Summary (Last 24 hours) at 3/1/2024 1329  Last data filed at 3/1/2024 1300  Gross per 24 hour   Intake 1980 ml   Output 1600 ml   Net 380 ml         Admission Weight  Weight: 72 kg (158 lb 11.7 oz) (02/25/24 2204)    Daily Weight  02/25/24 : 72 kg (158 lb 11.7 oz)    Image Results  IR CVC tunneled  Narrative: Interpreted By:  Bismark Crandall,   STUDY:  IR CVC TUNNELED;  2/29/2024 12:18 pm      INDICATION:  Signs/Symptoms:needs dialysis.      COMPARISON:  None.      ACCESSION NUMBER(S):  [id number]      ORDERING CLINICIAN:  KYRA  KARMEN      TECHNIQUE:      CONSENT:  The patient/patient's POA/next of kin was informed of the nature of  the proposed procedure. The purposes, alternatives, risks, and  benefits were explained and discussed. All questions were answered  and consent was obtained.      RADIATION EXPOSURE:  Fluoroscopy time: 0.3 min.  Dose: 1.9 mGy.  Dose Area Product (DAP): 0.4      SEDATION:  Moderate conscious IV sedation services (supervision of  administration, induction, and maintenance) were provided by the  physician performing the procedure with intravenous versed 1 mg for  15 minutes. The physician was assisted by an independent trained  observer, an interventional radiology nurse, in the continuous  monitoring of patient level of consciousness and physiologic status.      MEDICATION/CONTRAST:  No additional.      TIME OUT:  A time out was performed immediately prior to procedure start with  the interventional team, correctly identifying the patient name, date  of birth, MRN, procedure, anatomy (including marking of site and  side), patient position, procedure consent form, relevant laboratory  and imaging test results, antibiotic administration, safety  precautions, and procedure-specific equipment needs.      COMPLICATIONS:  No immediate adverse events identified.      FINDINGS:  Maximum sterile barrier technique was implemented. In the recumbent  position, the patient was positioned on the angiography table. The  right supraclavicular and infraclavicular cutaneous tissues were  prepared and draped in usual sterile manner. 1% Lidocaine was  instilled into the subcutaneous soft tissues at the access site and  tunnel tract for local anesthesia. Ultrasound images demonstrate a  patent  right internal jugular vein. Under direct ultrasound  guidance, a micropuncture needle was used to access the  right  internal jugular vein utilizing Seldinger and micropuncture  technique. An ultrasound digital spot image was acquired to  confirm  location and stored on the  PACS.      After confirmation of location, a 018 Kimberly-Mandrill guidewire was  introduced through the access needle to secure and confirm location.  The guidewire was advanced into the inferior vena cava under  intermittent fluoroscopy. There was subsequent exchange and placement  over the wire for a coaxial 5-Singaporean dilator sheath system. The inner  dilator and wire were removed with the sheath left in place. A 035  Glide guidewire was introduced to the sheath with access into the  inferior vena cava. Venous access site soft tissue dilation was  performed to an eventual  12-Singaporean peel-away sheath dilator system.  A tunnel tract was created from the  right infraclavicular chest to  the venous access site. After continuity of the tunnel tract and  venous access site was obtained, a  19 cm tunneled hemodialysis  catheter was then placed with tract continuity and its central  catheter tip(s) to reside at the caval-atrial junction. A  fluoroscopic spot image of the chest was acquired in the AP  projection to confirm optimal location.      The catheter ports were aspirated and flushed without resistance with  normal saline. The catheter ports were then charged with  high-concentration heparin. The dermatotomy venous access site was  closed with 3-0 absorbable sutures and dressed with sterile bandage.  The catheter was secured at the tunnel access site with a  purse-string 2-0 polypropylene suture and dressed with sterile  bandage.      The patient tolerated the procedure without complication.      Impression: 1.  Uncomplicated and technically successful placement of an  indwelling  right tunneled hemodialysis catheter. Catheter is ready  for immediate use.          Performed and dictated at [facility].      MACRO:  None      Signed by: Bismark Crandall 2/29/2024 12:27 PM  Dictation workstation:   [id number]      Physical Exam  Constitutional:        General: She is not in acute distress.     Appearance: Normal appearance. She is not ill-appearing or diaphoretic.   HENT:      Head: Normocephalic and atraumatic.      Mouth/Throat:      Mouth: Mucous membranes are moist.      Pharynx: Oropharynx is clear.   Eyes:      Extraocular Movements: Extraocular movements intact.      Conjunctiva/sclera: Conjunctivae normal.      Pupils: Pupils are equal, round, and reactive to light.   Neck:      Vascular: No carotid bruit.   Cardiovascular:      Rate and Rhythm: Normal rate and regular rhythm.      Pulses: Normal pulses.      Heart sounds: Murmur heard.      Comments: Faint ejection murmur heard best at the left lower sternal border  Pulmonary:      Comments: Diminished breath sounds are all lung fields  Abdominal:      General: Abdomen is flat. Bowel sounds are normal. There is no distension.      Palpations: Abdomen is soft. There is no mass.      Tenderness: There is no abdominal tenderness. There is no guarding or rebound.   Musculoskeletal:      Cervical back: Normal range of motion and neck supple. No rigidity or tenderness.      Right lower leg: Edema present.      Left lower leg: Edema present.      Comments: Trace pitting edema of bilateral lower extremities Skin:     General: Skin is warm and dry.      Capillary Refill: Capillary refill takes less than 2 seconds.      Findings:  No bruising or rash.      Comments:   Neurological:      Mental Status: She is alert and oriented to person, place, and time.      Cranial Nerves: No cranial nerve deficit.      Sensory: Sensory deficit present.      Motor: No weakness.      Coordination: Coordination normal.      Gait: Gait normal.      Comments: AOx3,    Psychiatric:         Mood and Affect: Mood normal.         Behavior: Behavior normal.         Thought Content: Thought content normal.         Judgment: Judgment normal.   Relevant Results               Assessment/Plan                  Principal Problem:    Acute  kidney injury (nontraumatic) (CMS/[facility])    #ESTEE on CKD IV  Status post temporary dialysis catheter placement  Started on hemodialysis per nephrologist recommendation today  -Trend renal function  -Nephrologist on board     #Acute on chronic anemia  Likely related to MDS and anemia of chronic disease from worsening renal failure  Status post 1 unit of packed red blood cell transfusion.  I will transfuse 1 more unit  Continue Procrit per nephrology  Trend CBC daily        #Chronic diastolic heart failure   -Resume goal-directed medical therapy  -Hold diuretics for now  -      #HTN,   -Resume antihypertensives with parameters     Hyperlipidemia  Resume statins     #COPD without acute exacerbation   -Continue home therapies      #NIDDM-II   -Accucheks ACHS  -Insulin sliding scale  -Hemoglobin A1c in a.m.-        #Generalized weakness, acute on chronic deconditioning   -Likely 2/2 above   -Continue management  -PT/OT/CM consults appreciated      #DVT prophylaxis  Subcutaneous heparin             Spent 35 minutes in the follow-up management of this patient today    Naga Carr MD

## 2024-03-01 NOTE — PROGRESS NOTES
Occupational Therapy                 Therapy Communication Note    Patient Name: Tana Hargrove  MRN: [id number]  Today's Date: 3/1/2024     Discipline: Occupational Therapy    Missed Visit Reason: Missed Visit Reason:  (attempted to see pt at this time however pt stated she is sick and has been throwing up. Will re-attempt at possible.)    Missed Time: Attempt    Comment:

## 2024-03-01 NOTE — PROGRESS NOTES
Additional required dialysis documentation faxed to Nic at [facility] [phone]. Nic can be reached by phone at [phone]. Patient was successfully dialyzed yesterday. Patient was unable to work with PT/OT yesterday due to being off the floor for dialysis. Once we have current PT/OT notes we will be able to start the insurance auth for SNF. Following.

## 2024-03-01 NOTE — PROCEDURES
Report to Receiving RN:    Report To: Silvia Edwards RN  Time Report Called: 1154  Hand-Off Communication: Pt tolerated tx well. 2 to off time pt became nauseous   Complications During Treatment: No  Ultrafiltration Treatment: Yes, and HD  Medications Administered During Dialysis: No  Blood Products Administered During Dialysis: No  Labs Sent During Dialysis: No  Heparin Drip Rate Changes: N/A    Electronic Signatures:  Signed Macey Waller CODT     Signed Silvia Edwards RN       Last Updated: 12:24 PM by MACEY WALLER

## 2024-03-02 LAB
ANION GAP SERPL CALC-SCNC: 9 MMOL/L (ref 10–20)
BUN SERPL-MCNC: 23 MG/DL (ref 6–23)
CALCIUM SERPL-MCNC: 8.5 MG/DL (ref 8.6–10.3)
CHLORIDE SERPL-SCNC: 103 MMOL/L (ref 98–107)
CO2 SERPL-SCNC: 31 MMOL/L (ref 21–32)
CREAT SERPL-MCNC: 1.88 MG/DL (ref 0.5–1.05)
EGFRCR SERPLBLD CKD-EPI 2021: 27 ML/MIN/1.73M*2
ERYTHROCYTE [DISTWIDTH] IN BLOOD BY AUTOMATED COUNT: 20.2 % (ref 11.5–14.5)
GLUCOSE BLD MANUAL STRIP-MCNC: 103 MG/DL (ref 74–99)
GLUCOSE BLD MANUAL STRIP-MCNC: 114 MG/DL (ref 74–99)
GLUCOSE BLD MANUAL STRIP-MCNC: 125 MG/DL (ref 74–99)
GLUCOSE BLD MANUAL STRIP-MCNC: 165 MG/DL (ref 74–99)
GLUCOSE SERPL-MCNC: 113 MG/DL (ref 74–99)
HCT VFR BLD AUTO: 22.9 % (ref 36–46)
HGB BLD-MCNC: 7.5 G/DL (ref 12–16)
MCH RBC QN AUTO: 30.4 PG (ref 26–34)
MCHC RBC AUTO-ENTMCNC: 32.8 G/DL (ref 32–36)
MCV RBC AUTO: 93 FL (ref 80–100)
NRBC BLD-RTO: 0 /100 WBCS (ref 0–0)
PLATELET # BLD AUTO: 92 X10*3/UL (ref 150–450)
POTASSIUM SERPL-SCNC: 4.5 MMOL/L (ref 3.5–5.3)
RBC # BLD AUTO: 2.47 X10*6/UL (ref 4–5.2)
SODIUM SERPL-SCNC: 138 MMOL/L (ref 136–145)
WBC # BLD AUTO: 2.6 X10*3/UL (ref 4.4–11.3)

## 2024-03-02 PROCEDURE — 1100000001 HC PRIVATE ROOM DAILY

## 2024-03-02 PROCEDURE — 82947 ASSAY GLUCOSE BLOOD QUANT: CPT

## 2024-03-02 PROCEDURE — 2500000001 HC RX 250 WO HCPCS SELF ADMINISTERED DRUGS (ALT 637 FOR MEDICARE OP): Performed by: INTERNAL MEDICINE

## 2024-03-02 PROCEDURE — 36415 COLL VENOUS BLD VENIPUNCTURE: CPT | Performed by: INTERNAL MEDICINE

## 2024-03-02 PROCEDURE — 99233 SBSQ HOSP IP/OBS HIGH 50: CPT | Performed by: INTERNAL MEDICINE

## 2024-03-02 PROCEDURE — 2500000005 HC RX 250 GENERAL PHARMACY W/O HCPCS: Performed by: INTERNAL MEDICINE

## 2024-03-02 PROCEDURE — 1090000001 HH PPS REVENUE CREDIT

## 2024-03-02 PROCEDURE — 85027 COMPLETE CBC AUTOMATED: CPT | Performed by: INTERNAL MEDICINE

## 2024-03-02 PROCEDURE — 2500000004 HC RX 250 GENERAL PHARMACY W/ HCPCS (ALT 636 FOR OP/ED): Performed by: INTERNAL MEDICINE

## 2024-03-02 PROCEDURE — 80048 BASIC METABOLIC PNL TOTAL CA: CPT | Performed by: INTERNAL MEDICINE

## 2024-03-02 PROCEDURE — 2500000002 HC RX 250 W HCPCS SELF ADMINISTERED DRUGS (ALT 637 FOR MEDICARE OP, ALT 636 FOR OP/ED): Performed by: INTERNAL MEDICINE

## 2024-03-02 PROCEDURE — 1090000002 HH PPS REVENUE DEBIT

## 2024-03-02 RX ORDER — DESMOPRESSIN ACETATE 0.1 MG/ML
10 SOLUTION NASAL ONCE
Status: DISCONTINUED | OUTPATIENT
Start: 2024-03-02 | End: 2024-03-02

## 2024-03-02 RX ORDER — PROCHLORPERAZINE EDISYLATE 5 MG/ML
10 INJECTION INTRAMUSCULAR; INTRAVENOUS ONCE
Status: COMPLETED | OUTPATIENT
Start: 2024-03-02 | End: 2024-03-02

## 2024-03-02 RX ORDER — AMINOCAPROIC ACID 500 MG/1
2 TABLET ORAL ONCE
Status: DISCONTINUED | OUTPATIENT
Start: 2024-03-02 | End: 2024-03-02 | Stop reason: ALTCHOICE

## 2024-03-02 RX ORDER — SILVER NITRATE 38.21; 12.74 MG/1; MG/1
STICK TOPICAL ONCE
Status: COMPLETED | OUTPATIENT
Start: 2024-03-02 | End: 2024-03-02

## 2024-03-02 RX ADMIN — ALLOPURINOL 100 MG: 100 TABLET ORAL at 10:23

## 2024-03-02 RX ADMIN — ACETAMINOPHEN 487.5 MG: 325 TABLET ORAL at 04:16

## 2024-03-02 RX ADMIN — CYANOCOBALAMIN TAB 1000 MCG 1000 MCG: 1000 TAB at 10:23

## 2024-03-02 RX ADMIN — INSULIN LISPRO 1 UNITS: 100 INJECTION, SOLUTION INTRAVENOUS; SUBCUTANEOUS at 11:15

## 2024-03-02 RX ADMIN — Medication 2 EACH: at 06:45

## 2024-03-02 RX ADMIN — LINEZOLID 600 MG: 600 TABLET, FILM COATED ORAL at 20:24

## 2024-03-02 RX ADMIN — ACETAMINOPHEN 487.5 MG: 325 TABLET ORAL at 21:30

## 2024-03-02 RX ADMIN — LINEZOLID 600 MG: 600 TABLET, FILM COATED ORAL at 10:23

## 2024-03-02 RX ADMIN — ONDANSETRON 4 MG: 2 INJECTION INTRAMUSCULAR; INTRAVENOUS at 06:19

## 2024-03-02 RX ADMIN — PROCHLORPERAZINE EDISYLATE 10 MG: 5 INJECTION INTRAMUSCULAR; INTRAVENOUS at 07:29

## 2024-03-02 RX ADMIN — PROMETHAZINE HYDROCHLORIDE 6.25 MG: 25 INJECTION INTRAMUSCULAR; INTRAVENOUS at 10:24

## 2024-03-02 RX ADMIN — PIOGLITAZONE HYDROCHLORIDE 15 MG: 15 TABLET ORAL at 10:23

## 2024-03-02 RX ADMIN — SITAGLIPTIN 25 MG: 50 TABLET, FILM COATED ORAL at 10:24

## 2024-03-02 RX ADMIN — AMLODIPINE BESYLATE 5 MG: 5 TABLET ORAL at 10:24

## 2024-03-02 RX ADMIN — DESMOPRESSIN ACETATE 21.6 MCG: 4 SOLUTION INTRAVENOUS at 11:12

## 2024-03-02 RX ADMIN — Medication 2000 UNITS: at 10:24

## 2024-03-02 RX ADMIN — SILVER NITRATE APPLICATORS 5 APPLICATION: 25; 75 STICK TOPICAL at 06:45

## 2024-03-02 RX ADMIN — PREGABALIN 75 MG: 75 CAPSULE ORAL at 10:24

## 2024-03-02 RX ADMIN — METOPROLOL TARTRATE 25 MG: 25 TABLET, FILM COATED ORAL at 10:24

## 2024-03-02 RX ADMIN — PRAVASTATIN SODIUM 40 MG: 40 TABLET ORAL at 20:24

## 2024-03-02 ASSESSMENT — PAIN SCALES - GENERAL
PAINLEVEL_OUTOF10: 0 - NO PAIN
PAINLEVEL_OUTOF10: 0 - NO PAIN
PAINLEVEL_OUTOF10: 3
PAINLEVEL_OUTOF10: 6
PAINLEVEL_OUTOF10: 0 - NO PAIN
PAINLEVEL_OUTOF10: 0 - NO PAIN

## 2024-03-02 ASSESSMENT — COGNITIVE AND FUNCTIONAL STATUS - GENERAL
DAILY ACTIVITIY SCORE: 16
TURNING FROM BACK TO SIDE WHILE IN FLAT BAD: A LITTLE
MOBILITY SCORE: 14
DRESSING REGULAR LOWER BODY CLOTHING: A LOT
MOVING FROM LYING ON BACK TO SITTING ON SIDE OF FLAT BED WITH BEDRAILS: A LITTLE
TURNING FROM BACK TO SIDE WHILE IN FLAT BAD: A LITTLE
DAILY ACTIVITIY SCORE: 15
WALKING IN HOSPITAL ROOM: A LOT
TOILETING: A LOT
MOBILITY SCORE: 15
STANDING UP FROM CHAIR USING ARMS: A LOT
MOVING TO AND FROM BED TO CHAIR: A LITTLE
CLIMB 3 TO 5 STEPS WITH RAILING: A LOT
PERSONAL GROOMING: A LITTLE
WALKING IN HOSPITAL ROOM: A LOT
PERSONAL GROOMING: A LITTLE
CLIMB 3 TO 5 STEPS WITH RAILING: TOTAL
DRESSING REGULAR LOWER BODY CLOTHING: A LOT
DRESSING REGULAR UPPER BODY CLOTHING: A LITTLE
MOVING FROM LYING ON BACK TO SITTING ON SIDE OF FLAT BED WITH BEDRAILS: A LITTLE
DRESSING REGULAR UPPER BODY CLOTHING: A LOT
STANDING UP FROM CHAIR USING ARMS: A LOT
TOILETING: A LOT
MOVING TO AND FROM BED TO CHAIR: A LITTLE
HELP NEEDED FOR BATHING: A LOT
HELP NEEDED FOR BATHING: A LOT

## 2024-03-02 ASSESSMENT — PAIN - FUNCTIONAL ASSESSMENT
PAIN_FUNCTIONAL_ASSESSMENT: 0-10

## 2024-03-02 NOTE — PROGRESS NOTES
Occupational Therapy                 Therapy Communication Note    Patient Name: Tana Hargrove  MRN: [id number]  Today's Date: 3/2/2024     Discipline: Occupational Therapy    Missed Visit Reason: Missed Visit Reason: Patient placed on medical hold (Hold d/t bleeding at dialysis site.)    Missed Time: Attempt    Comment:

## 2024-03-02 NOTE — PROGRESS NOTES
Physical Therapy                 Therapy Communication Note    Patient Name: Tana Hargrove  MRN: [id number]  Today's Date: 3/2/2024     Discipline: Physical Therapy    Missed Visit Reason:      Missed Time: Attempt    Comment:Pt attempted to be seen this date. Per nursing Pt on hold due to bleeding at port site. Case management notified.

## 2024-03-02 NOTE — PROGRESS NOTES
Physical Therapy                 Therapy Communication Note    Patient Name: Tana Hargrove  MRN: [id number]  Today's Date: 3/2/2024     Discipline: Physical Therapy    Missed Visit Reason:      Missed Time: Attempt 8:57    Comment:Pt attempted to be seen. Nursing deferred tx at this time due to bleeding at port site.

## 2024-03-02 NOTE — PROGRESS NOTES
"      Nephrology Progress Note      Nephrology following for ESTEE on CKD IV.     Events over night:     Patient had bleeding from her TDC exit site last night.  She was given DDAVP, sandbag  Bleeding has stopped  Has nausea today        /72 (BP Location: Right arm, Patient Position: Lying)   Pulse 79   Temp 36.6 °C (97.9 °F) (Temporal)   Resp 17   Ht 1.549 m (5' 0.98\")   Wt 72 kg (158 lb 11.7 oz)   SpO2 90%   BMI 30.01 kg/m²     Input / Output:  24 HR:   Intake/Output Summary (Last 24 hours) at 3/2/2024 1048  Last data filed at 3/2/2024 0600  Gross per 24 hour   Intake 750 ml   Output 1156 ml   Net -406 ml         Physical Exam   Alert and oriented x 3 NAD  Neck: no JVD  CV: RRR  Lungs: CTA bilaterally  Abd: soft, NT, ND   Ext: minimal lower extremity edema    Scheduled medications  allopurinol, 100 mg, oral, Daily  amLODIPine, 5 mg, oral, Daily  [Held by provider] aspirin, 81 mg, oral, Daily  cholecalciferol, 2,000 Units, oral, Daily  cyanocobalamin, 1,000 mcg, oral, Daily  desmopressin, 0.3 mcg/kg, intravenous, Once  [Held by provider] heparin (porcine), 5,000 Units, subcutaneous, q8h  heparin, 2,000 Units, intra-catheter, After Dialysis  heparin, 2,000 Units, intra-catheter, After Dialysis  insulin lispro, 0-5 Units, subcutaneous, TID with meals  linezolid, 600 mg, oral, BID  metoprolol tartrate, 25 mg, oral, Daily  pioglitazone, 15 mg, oral, Daily  pravastatin, 40 mg, oral, Nightly  pregabalin, 75 mg, oral, Every other day  sevelamer carbonate, 800 mg, oral, TID with meals  SITagliptin phosphate, 25 mg, oral, Daily      Continuous medications       PRN medications  PRN medications: acetaminophen, acetaminophen, colchicine, dextrose 10 % in water (D10W), dextrose, ferric subsulfate, glucagon, meclizine, ondansetron, sennosides-docusate sodium   Results from last 7 days   Lab Units 03/02/24  0347 02/28/24  0548 02/27/24  0318   SODIUM mmol/L 138   < > 136   POTASSIUM mmol/L 4.5   < > 5.4*   CHLORIDE " mmol/L 103   < > 112*   CO2 mmol/L 31   < > 19*   BUN mg/dL 23   < > 96*   CREATININE mg/dL 1.88*   < > 4.00*   CALCIUM mg/dL 8.5*   < > 8.5*   PROTEIN TOTAL g/dL  --   --  5.2*   BILIRUBIN TOTAL mg/dL  --   --  0.4   ALK PHOS U/L  --   --  38   ALT U/L  --   --  10   AST U/L  --   --  16   GLUCOSE mg/dL 113*   < > 126*    < > = values in this interval not displayed.        Results from last 7 days   Lab Units 02/25/24  2319   MAGNESIUM mg/dL 2.12        Results from last 7 days   Lab Units 03/02/24  0347 03/01/24  0436 02/28/24  0548   WBC AUTO x10*3/uL 2.6* 3.6* 3.8*   HEMOGLOBIN g/dL 7.5* 7.8* 8.0*   HEMATOCRIT % 22.9* 24.6* 24.3*   PLATELETS AUTO x10*3/uL 92* 115* 156          Assessment & Plan:     Assessment:   Patient is 78 y.o. female who is admitted to hospital for weakness. Nephrology consulted in view of ESTEE on CKD IV.     Progression of CKD stage IV now ESRD  - Patient does have a history of requiring dialysis in the past from Dec 2021 to March 2022  - Patient's creatinine has been worsening over past month, it was 3.07 on 2/18 upon previous discharge. Prior to that her baseline was around 2.6.  - Creatinine upon admission is 4.67,   - Fatigue and weakness may be secondary to uremia, patient experiencing some myoclonic jerks and asterixis as well  - UA with no evidence of infection, 1+ protein which is not new  - No recent hypotension or nephrotoxins  -Initiated dialysis on 2- 29-24 for ongoing uremia    Acute on chronic anemia  - Secondary to CKD and MDS  - Hb 7.1 --> 6.8 --> 6.9 (after 1 unit PRBC's)  - On high-dose Procrit weekly  - Receives intermittent transfusions as needed     Hyperkalemia  - mild  - no EKG changes     Hyperphosphatemia  - phosphate binder     Metabolic acidosis  -Corrected with dialysis     Volume status  - appears euvolemic  - minimal lower extremity swelling  - no shortness of breath, chest x-ray clear        Recommendations:     -Will hold off on third dialysis  treatment, due to the TDC exit site bleeding.  Will plan third treatment on 3/4/2024  - Patient has outpatient chair in [place], however she is going to be going to a SNF in [place] so I have given orders to the unit closer to the SNF  - Discussed pursuing AV fistula as soon as possible    Please message me through [facility] chat with any questions or concerns.     Pinky Christie,       3/2/2024  10:48 AM     [facility]    [address], [address]   [place] [address]  Office: [phone]

## 2024-03-02 NOTE — SIGNIFICANT EVENT
Notified of bleeding from tunneled HD line. Figure-8 stitch placed around site x2 and Surgicel. Will monitor for further signs of bleeding.    Henry Harris MD    ADDENDUM  Still bleeding. Discussed with ED, who recommended topical TXA in addition to TXA-soaked gauze on top, which was done. Will also give IV DDAVP given pt is a dialysis patient.

## 2024-03-02 NOTE — PROGRESS NOTES
Tana Hargrove is a 78 y.o. female on day 5 of admission presenting with Acute kidney injury (nontraumatic) (CMS/HCC).      Subjective   Tana Hargrove is a 78 y.o. female with PMHx of HTN, HLD, chronic diastolic heart failure, COPD (no baseline O2), CKD, NIDDM-II, MDS with chronic anemia requiring Procrit and intermittent PRBC transfusions, OA, GERD, who presents to the emergency room with generalized fatigue and weakness.  Patient was recently discharged about 1 week prior to presentation after treatment for urinary tract infection.  She has been feeling much better at home up until the day prior to presentation when she suddenly fell.  She describes it as slumping down as she was attempting to go to the bathroom while using the walker.  This was a second episode on that day.  She denies any chest pain abdominal pain fever chills nausea or vomiting she denies any diarrhea prior to this she has been taking her medications regularly.  She admits to eating well but not drinking enough because she was concerned about overloading herself with fluid as she was told by a primary doctor.        On admission in the emergency room she was hemodynamically stable with a blood pressure 118/54 mmHg, heart rate of 66/min, respiratory rate of 18/min, temperature 36.5 °C, and was saturating 95% on room air.  Initial BMP showed sodium of 134 with a chloride of 108, bicarbonate of 16, BUN/creatinine of 113/4.67.  CBC showed a hemoglobin of 7.1.  His CT of the head was done which showed no acute intracranial abnormality.  Chest x-ray showed no acute cardiopulmonary disease and a right foot x-ray showed no acute osseous abnormality as well with mild multifocal age-related osteoarthritis changes.  Urine analysis showed no signs of UTI.  Patient has been admitted for further evaluation and management.  2/27: Patient was seen and examined.  Still complaining of generalized weakness.  Hemoglobin is 6.9 today after 1 units of packed red  blood cell transfusion.  Likely related to hemodilution.  I will transfuse 1 more unit of packed red blood cell today.  Creatinine is improving down to 4.0 today.  Continue IV bicarbonate and nephrologist.  Nephrology is on board and recommendations appreciated.  Seen by PT OT who recommend extended-care facility however patient is adamantly refusing SNF placement.  Repeat CBC and BMP in AM.  2/28: Patient was seen and examined.  She is clinically better today.  Creatinine is around her baseline at 3.47 today.  Hemoglobin is 8.0 today.  Currently awaiting authorization for placement in an extended care facility.  2/29: Patient was seen and examined.  After extensive conversation with nephrology decision to start patient on hemodialysis was made.  Patient to get a temporary dialysis catheter today and to commence hemodialysis afterwards.  Repeat RFP in AM.  3/1: Patient was seen and examined.  Tunneled dialysis catheter in situ and patient has had 2 sessions of hemodialysis so far.  Was complaining of chest pain at dialysis catheter site.  Catheter site does not look infected.  I will give as needed Tylenol for pain.  Trend RFP daily.  3/2: Patient continued to have significant bleeding overnight was given order for DDAVP today IV since nasal not available here.  Also local Tranexamic acid was given around the catheter and Monsel solution.  Pressure dressing was replaced.  Will continue to monitor.  Continues on Zyvox.  Will take over case today.  Case discussed with nurse at bedside I am familiar with this patient from previous admissions.       Objective     Last Recorded Vitals  /72 (BP Location: Right arm, Patient Position: Lying)   Pulse 79   Temp 36.6 °C (97.9 °F) (Temporal)   Resp 17   Wt 72 kg (158 lb 11.7 oz)   SpO2 90%   Intake/Output last 3 Shifts:    Intake/Output Summary (Last 24 hours) at 3/2/2024 1008  Last data filed at 3/2/2024 0600  Gross per 24 hour   Intake 750 ml   Output 1156 ml   Net  -406 ml         Admission Weight  Weight: 72 kg (158 lb 11.7 oz) (02/25/24 2204)    Daily Weight  02/25/24 : 72 kg (158 lb 11.7 oz)    Image Results  IR CVC tunneled  Narrative: Interpreted By:  Bismark Crandall,   STUDY:  IR CVC TUNNELED;  2/29/2024 12:18 pm      INDICATION:  Signs/Symptoms:needs dialysis.      COMPARISON:  None.      ACCESSION NUMBER(S):  [id number]      ORDERING CLINICIAN:  KYRA PERAZA      TECHNIQUE:      CONSENT:  The patient/patient's POA/next of kin was informed of the nature of  the proposed procedure. The purposes, alternatives, risks, and  benefits were explained and discussed. All questions were answered  and consent was obtained.      RADIATION EXPOSURE:  Fluoroscopy time: 0.3 min.  Dose: 1.9 mGy.  Dose Area Product (DAP): 0.4      SEDATION:  Moderate conscious IV sedation services (supervision of  administration, induction, and maintenance) were provided by the  physician performing the procedure with intravenous versed 1 mg for  15 minutes. The physician was assisted by an independent trained  observer, an interventional radiology nurse, in the continuous  monitoring of patient level of consciousness and physiologic status.      MEDICATION/CONTRAST:  No additional.      TIME OUT:  A time out was performed immediately prior to procedure start with  the interventional team, correctly identifying the patient name, date  of birth, MRN, procedure, anatomy (including marking of site and  side), patient position, procedure consent form, relevant laboratory  and imaging test results, antibiotic administration, safety  precautions, and procedure-specific equipment needs.      COMPLICATIONS:  No immediate adverse events identified.      FINDINGS:  Maximum sterile barrier technique was implemented. In the recumbent  position, the patient was positioned on the angiography table. The  right supraclavicular and infraclavicular cutaneous tissues were  prepared and draped in usual sterile manner.  1% Lidocaine was  instilled into the subcutaneous soft tissues at the access site and  tunnel tract for local anesthesia. Ultrasound images demonstrate a  patent  right internal jugular vein. Under direct ultrasound  guidance, a micropuncture needle was used to access the  right  internal jugular vein utilizing Seldinger and micropuncture  technique. An ultrasound digital spot image was acquired to confirm  location and stored on the  PACS.      After confirmation of location, a 018 [place] guidewire was  introduced through the access needle to secure and confirm location.  The guidewire was advanced into the inferior vena cava under  intermittent fluoroscopy. There was subsequent exchange and placement  over the wire for a coaxial 5-[place] dilator sheath system. The inner  dilator and wire were removed with the sheath left in place. A 035  Glide guidewire was introduced to the sheath with access into the  inferior vena cava. Venous access site soft tissue dilation was  performed to an eventual  12-[place] peel-away sheath dilator system.  A tunnel tract was created from the  right infraclavicular chest to  the venous access site. After continuity of the tunnel tract and  venous access site was obtained, a  19 cm tunneled hemodialysis  catheter was then placed with tract continuity and its central  catheter tip(s) to reside at the caval-atrial junction. A  fluoroscopic spot image of the chest was acquired in the AP  projection to confirm optimal location.      The catheter ports were aspirated and flushed without resistance with  normal saline. The catheter ports were then charged with  high-concentration heparin. The dermatotomy venous access site was  closed with 3-0 absorbable sutures and dressed with sterile bandage.  The catheter was secured at the tunnel access site with a  purse-string 2-0 polypropylene suture and dressed with sterile  bandage.      The patient tolerated the procedure without  complication.      Impression: 1.  Uncomplicated and technically successful placement of an  indwelling  right tunneled hemodialysis catheter. Catheter is ready  for immediate use.          Performed and dictated at [facility].      MACRO:  None      Signed by: Bismark Crandall 2/29/2024 12:27 PM  Dictation workstation:   [id number]      Physical Exam  al.   Relevant Results               Assessment/Plan      EXAM:    Constitutional: Patient is a little uncomfortable because of ongoing bleeding today.    Head and facial: Slightly pale dialysis catheter right upper chest is continuing to seep blood.    Neck: Neck supple    Lungs: Breath sounds bilaterally    Heart: Regular heart rate and frankie    Abdomen: Nontender    Pelvis/: No flank tenderness    Extremities: Some peripheral edema noted stocking-glove neuropathy noted    Neurologic: Stocking glove neuropathy noted no other focal deficits    Dermatologic: Seepage around dialysis catheter appears to have stopped.    Psychiatric/behavioral: Patient somewhat anxious but pleasant and appropriate        Scheduled medications  allopurinol, 100 mg, oral, Daily  amLODIPine, 5 mg, oral, Daily  [Held by provider] aspirin, 81 mg, oral, Daily  cholecalciferol, 2,000 Units, oral, Daily  cyanocobalamin, 1,000 mcg, oral, Daily  desmopressin, 0.3 mcg/kg, intravenous, Once  [Held by provider] heparin (porcine), 5,000 Units, subcutaneous, q8h  heparin, 2,000 Units, intra-catheter, After Dialysis  heparin, 2,000 Units, intra-catheter, After Dialysis  insulin lispro, 0-5 Units, subcutaneous, TID with meals  linezolid, 600 mg, oral, BID  metoprolol tartrate, 25 mg, oral, Daily  pioglitazone, 15 mg, oral, Daily  pravastatin, 40 mg, oral, Nightly  pregabalin, 75 mg, oral, Every other day  promethazine, 6.25 mg, intravenous, Once  sevelamer carbonate, 800 mg, oral, TID with meals  SITagliptin phosphate, 25 mg, oral, Daily      Continuous medications      PRN medications  PRN medications: acetaminophen, acetaminophen, colchicine, dextrose 10 % in water (D10W), dextrose, ferric subsulfate, glucagon, meclizine, ondansetron, sennosides-docusate sodium          Principal Problem:    Acute kidney injury (nontraumatic) (CMS/HCC)    Persistent bleeding from tunneled dialysis right upper chest  Acute kidney injury on top of chronic kidney disease  Acute anemia blood loss on top of anemia of chronic disease and myelodysplastic syndrome  Chronic diastolic heart failure  Hypertension  COPD without exacerbation  Type 2 diabetes mellitus   deconditioning and weakness  DVT prophylaxis-SCDs no anticoagulation      Status post interventions-DDAVP IV,Tranexamic acid and Monsel's topical  Oral Zyvox 600 p.o. twice daily  Hemodialysis per nephrology  Supplement iron as needed  Procrit per nephrology  Transfuse as needed  Allopurinol 100 mg daily  Colchicine as needed  Lyrica 75 mg every other day  Starlix 25 mg daily  Actos 15 mg daily  Subcu sliding scale insulin 3 times daily AC  Pravastatin 40 mg daily  Metoprolol 25 mg daily  Amlodipine 5 mg daily  Calcitriol 2000 units daily  B12 1000 mcg subcu daily  Sevelamer carbonate 800 mg 3 times daily with meals  As needed Tylenol, colchicine, iron, meclizine, Zofran, Senokot S  One-time dose of IV Phenergan  Avoid anticoagulants for now  PT and OT  Plan discharge her to [facility]  Check labs in a.m  See orders for complete plan        Spent 35 minutes in the follow-up management of this patient today    Warren Head MD

## 2024-03-02 NOTE — SIGNIFICANT EVENT
Patient with persistent bleeding from dialysis catheter area.  Area was infused with Tranexamic acid around the catheter.  Also placed Monsel solution on area of catheter where it meets the skin.  Dressing was changed with mild pressure dressing.  Will give low-dose DDAVP to help with platelet function.  Continue to monitor avoid manipulating catheter is much as possible.  Case discussed with nurse at bedside.

## 2024-03-02 NOTE — PROGRESS NOTES
Updated by PT/OT that they made two attempts to see patient today but patient is currently on a medical hold due to bleeding at her dialysis access site. Patient was unable to be seen yesterday as she was feeling poorly and vomiting after dialysis yesterday afternoon. Unable to start insurance authorization until we have current PT/OT notes. [facility] was updated via the portal that patient will likely be discharging mid-week next week, not Monday/Tues as previously anticipated. I spoke with patient's son in person today and explained where we are in the timeline for discharge, and updated that due to transportation needs, patient will be dialyzing at [facility] in [place] while she is at [facility]. [facility] following.

## 2024-03-03 ENCOUNTER — APPOINTMENT (OUTPATIENT)
Dept: RADIOLOGY | Facility: HOSPITAL | Age: 79
DRG: 674 | End: 2024-03-03
Payer: MEDICARE

## 2024-03-03 LAB
ABO GROUP (TYPE) IN BLOOD: NORMAL
ANION GAP SERPL CALC-SCNC: 8 MMOL/L (ref 10–20)
ANTIBODY SCREEN: NORMAL
BASOPHILS # BLD AUTO: 0.02 X10*3/UL (ref 0–0.1)
BASOPHILS NFR BLD AUTO: 0.6 %
BLOOD EXPIRATION DATE: NORMAL
BUN SERPL-MCNC: 40 MG/DL (ref 6–23)
CALCIUM SERPL-MCNC: 8.6 MG/DL (ref 8.6–10.3)
CHLORIDE SERPL-SCNC: 102 MMOL/L (ref 98–107)
CO2 SERPL-SCNC: 31 MMOL/L (ref 21–32)
CREAT SERPL-MCNC: 2.91 MG/DL (ref 0.5–1.05)
DACRYOCYTES BLD QL SMEAR: NORMAL
DISPENSE STATUS: NORMAL
EGFRCR SERPLBLD CKD-EPI 2021: 16 ML/MIN/1.73M*2
EOSINOPHIL # BLD AUTO: 0.19 X10*3/UL (ref 0–0.4)
EOSINOPHIL NFR BLD AUTO: 5.8 %
ERYTHROCYTE [DISTWIDTH] IN BLOOD BY AUTOMATED COUNT: 20.7 % (ref 11.5–14.5)
GLUCOSE BLD MANUAL STRIP-MCNC: 114 MG/DL (ref 74–99)
GLUCOSE BLD MANUAL STRIP-MCNC: 117 MG/DL (ref 74–99)
GLUCOSE BLD MANUAL STRIP-MCNC: 129 MG/DL (ref 74–99)
GLUCOSE BLD MANUAL STRIP-MCNC: 157 MG/DL (ref 74–99)
GLUCOSE SERPL-MCNC: 97 MG/DL (ref 74–99)
HCT VFR BLD AUTO: 21.2 % (ref 36–46)
HGB BLD-MCNC: 6.5 G/DL (ref 12–16)
HYPOCHROMIA BLD QL SMEAR: NORMAL
IMM GRANULOCYTES # BLD AUTO: 0.01 X10*3/UL (ref 0–0.5)
IMM GRANULOCYTES NFR BLD AUTO: 0.3 % (ref 0–0.9)
LYMPHOCYTES # BLD AUTO: 1.33 X10*3/UL (ref 0.8–3)
LYMPHOCYTES NFR BLD AUTO: 40.9 %
MAGNESIUM SERPL-MCNC: 1.76 MG/DL (ref 1.6–2.4)
MCH RBC QN AUTO: 29.8 PG (ref 26–34)
MCHC RBC AUTO-ENTMCNC: 30.7 G/DL (ref 32–36)
MCV RBC AUTO: 97 FL (ref 80–100)
MONOCYTES # BLD AUTO: 0.3 X10*3/UL (ref 0.05–0.8)
MONOCYTES NFR BLD AUTO: 9.2 %
NEUTROPHILS # BLD AUTO: 1.4 X10*3/UL (ref 1.6–5.5)
NEUTROPHILS NFR BLD AUTO: 43.2 %
NRBC BLD-RTO: 0 /100 WBCS (ref 0–0)
OVALOCYTES BLD QL SMEAR: NORMAL
PLATELET # BLD AUTO: 75 X10*3/UL (ref 150–450)
POTASSIUM SERPL-SCNC: 4.3 MMOL/L (ref 3.5–5.3)
PRODUCT BLOOD TYPE: 6200
PRODUCT CODE: NORMAL
RBC # BLD AUTO: 2.18 X10*6/UL (ref 4–5.2)
RBC MORPH BLD: NORMAL
RH FACTOR (ANTIGEN D): NORMAL
SODIUM SERPL-SCNC: 137 MMOL/L (ref 136–145)
STAPHYLOCOCCUS SPEC CULT: NORMAL
UNIT ABO: NORMAL
UNIT NUMBER: NORMAL
UNIT RH: NORMAL
UNIT VOLUME: 350
WBC # BLD AUTO: 3.3 X10*3/UL (ref 4.4–11.3)
XM INTEP: NORMAL

## 2024-03-03 PROCEDURE — 86901 BLOOD TYPING SEROLOGIC RH(D): CPT | Performed by: INTERNAL MEDICINE

## 2024-03-03 PROCEDURE — 2500000001 HC RX 250 WO HCPCS SELF ADMINISTERED DRUGS (ALT 637 FOR MEDICARE OP): Performed by: INTERNAL MEDICINE

## 2024-03-03 PROCEDURE — 86902 BLOOD TYPE ANTIGEN DONOR EA: CPT

## 2024-03-03 PROCEDURE — 86922 COMPATIBILITY TEST ANTIGLOB: CPT

## 2024-03-03 PROCEDURE — 94640 AIRWAY INHALATION TREATMENT: CPT

## 2024-03-03 PROCEDURE — P9040 RBC LEUKOREDUCED IRRADIATED: HCPCS

## 2024-03-03 PROCEDURE — 2500000002 HC RX 250 W HCPCS SELF ADMINISTERED DRUGS (ALT 637 FOR MEDICARE OP, ALT 636 FOR OP/ED): Performed by: INTERNAL MEDICINE

## 2024-03-03 PROCEDURE — 1100000001 HC PRIVATE ROOM DAILY

## 2024-03-03 PROCEDURE — 1090000001 HH PPS REVENUE CREDIT

## 2024-03-03 PROCEDURE — 71045 X-RAY EXAM CHEST 1 VIEW: CPT

## 2024-03-03 PROCEDURE — 83735 ASSAY OF MAGNESIUM: CPT | Performed by: INTERNAL MEDICINE

## 2024-03-03 PROCEDURE — 80048 BASIC METABOLIC PNL TOTAL CA: CPT | Performed by: INTERNAL MEDICINE

## 2024-03-03 PROCEDURE — 82947 ASSAY GLUCOSE BLOOD QUANT: CPT

## 2024-03-03 PROCEDURE — 99233 SBSQ HOSP IP/OBS HIGH 50: CPT | Performed by: INTERNAL MEDICINE

## 2024-03-03 PROCEDURE — 2500000005 HC RX 250 GENERAL PHARMACY W/O HCPCS: Performed by: INTERNAL MEDICINE

## 2024-03-03 PROCEDURE — 85025 COMPLETE CBC W/AUTO DIFF WBC: CPT | Performed by: INTERNAL MEDICINE

## 2024-03-03 PROCEDURE — 36415 COLL VENOUS BLD VENIPUNCTURE: CPT | Performed by: INTERNAL MEDICINE

## 2024-03-03 PROCEDURE — 36430 TRANSFUSION BLD/BLD COMPNT: CPT

## 2024-03-03 PROCEDURE — 1090000002 HH PPS REVENUE DEBIT

## 2024-03-03 RX ORDER — BUTALBITAL, ACETAMINOPHEN AND CAFFEINE 50; 325; 40 MG/1; MG/1; MG/1
2 TABLET ORAL ONCE
Status: COMPLETED | OUTPATIENT
Start: 2024-03-03 | End: 2024-03-03

## 2024-03-03 RX ORDER — SUMATRIPTAN SUCCINATE 25 MG/1
100 TABLET ORAL ONCE
Status: COMPLETED | OUTPATIENT
Start: 2024-03-03 | End: 2024-03-03

## 2024-03-03 RX ORDER — IPRATROPIUM BROMIDE AND ALBUTEROL SULFATE 2.5; .5 MG/3ML; MG/3ML
3 SOLUTION RESPIRATORY (INHALATION) ONCE
Status: COMPLETED | OUTPATIENT
Start: 2024-03-03 | End: 2024-03-03

## 2024-03-03 RX ADMIN — BUTALBITAL, ACETAMINOPHEN, AND CAFFEINE 2 TABLET: 50; 325; 40 TABLET ORAL at 13:15

## 2024-03-03 RX ADMIN — SEVELAMER CARBONATE 800 MG: 800 TABLET, FILM COATED ORAL at 13:15

## 2024-03-03 RX ADMIN — LINEZOLID 600 MG: 600 TABLET, FILM COATED ORAL at 08:45

## 2024-03-03 RX ADMIN — ALLOPURINOL 100 MG: 100 TABLET ORAL at 08:45

## 2024-03-03 RX ADMIN — PRAVASTATIN SODIUM 40 MG: 40 TABLET ORAL at 20:16

## 2024-03-03 RX ADMIN — CYANOCOBALAMIN TAB 1000 MCG 1000 MCG: 1000 TAB at 08:44

## 2024-03-03 RX ADMIN — SEVELAMER CARBONATE 800 MG: 800 TABLET, FILM COATED ORAL at 17:04

## 2024-03-03 RX ADMIN — Medication 2000 UNITS: at 08:45

## 2024-03-03 RX ADMIN — LINEZOLID 600 MG: 600 TABLET, FILM COATED ORAL at 20:16

## 2024-03-03 RX ADMIN — AMLODIPINE BESYLATE 5 MG: 5 TABLET ORAL at 08:45

## 2024-03-03 RX ADMIN — SITAGLIPTIN 25 MG: 50 TABLET, FILM COATED ORAL at 08:44

## 2024-03-03 RX ADMIN — SEVELAMER CARBONATE 800 MG: 800 TABLET, FILM COATED ORAL at 08:44

## 2024-03-03 RX ADMIN — Medication: at 23:00

## 2024-03-03 RX ADMIN — IPRATROPIUM BROMIDE AND ALBUTEROL SULFATE 3 ML: 2.5; .5 SOLUTION RESPIRATORY (INHALATION) at 23:22

## 2024-03-03 RX ADMIN — METOPROLOL TARTRATE 25 MG: 25 TABLET, FILM COATED ORAL at 08:45

## 2024-03-03 RX ADMIN — PIOGLITAZONE HYDROCHLORIDE 15 MG: 15 TABLET ORAL at 08:45

## 2024-03-03 RX ADMIN — SUMATRIPTAN SUCCINATE 100 MG: 25 TABLET ORAL at 13:15

## 2024-03-03 ASSESSMENT — COGNITIVE AND FUNCTIONAL STATUS - GENERAL
STANDING UP FROM CHAIR USING ARMS: A LOT
DRESSING REGULAR UPPER BODY CLOTHING: A LITTLE
PERSONAL GROOMING: A LITTLE
CLIMB 3 TO 5 STEPS WITH RAILING: A LOT
MOBILITY SCORE: 15
TOILETING: A LOT
STANDING UP FROM CHAIR USING ARMS: A LOT
MOVING TO AND FROM BED TO CHAIR: A LITTLE
CLIMB 3 TO 5 STEPS WITH RAILING: A LOT
TURNING FROM BACK TO SIDE WHILE IN FLAT BAD: A LITTLE
DAILY ACTIVITIY SCORE: 16
WALKING IN HOSPITAL ROOM: A LOT
PERSONAL GROOMING: A LITTLE
DRESSING REGULAR UPPER BODY CLOTHING: A LITTLE
TURNING FROM BACK TO SIDE WHILE IN FLAT BAD: A LITTLE
HELP NEEDED FOR BATHING: A LOT
MOVING TO AND FROM BED TO CHAIR: A LITTLE
MOBILITY SCORE: 15
DAILY ACTIVITIY SCORE: 16
DRESSING REGULAR LOWER BODY CLOTHING: A LOT
MOVING FROM LYING ON BACK TO SITTING ON SIDE OF FLAT BED WITH BEDRAILS: A LITTLE
HELP NEEDED FOR BATHING: A LOT
WALKING IN HOSPITAL ROOM: A LOT
TOILETING: A LOT
DRESSING REGULAR LOWER BODY CLOTHING: A LOT
MOVING FROM LYING ON BACK TO SITTING ON SIDE OF FLAT BED WITH BEDRAILS: A LITTLE

## 2024-03-03 ASSESSMENT — PAIN SCALES - GENERAL
PAINLEVEL_OUTOF10: 9
PAINLEVEL_OUTOF10: 0 - NO PAIN
PAINLEVEL_OUTOF10: 0 - NO PAIN

## 2024-03-03 ASSESSMENT — PAIN - FUNCTIONAL ASSESSMENT
PAIN_FUNCTIONAL_ASSESSMENT: 0-10
PAIN_FUNCTIONAL_ASSESSMENT: 0-10

## 2024-03-03 NOTE — PROGRESS NOTES
Tana Hargrove is a 78 y.o. female on day 6 of admission presenting with Acute kidney injury (nontraumatic) (CMS/HCC).      Subjective   Tana Hargrove is a 78 y.o. female with PMHx of HTN, HLD, chronic diastolic heart failure, COPD (no baseline O2), CKD, NIDDM-II, MDS with chronic anemia requiring Procrit and intermittent PRBC transfusions, OA, GERD, who presents to the emergency room with generalized fatigue and weakness.  Patient was recently discharged about 1 week prior to presentation after treatment for urinary tract infection.  She has been feeling much better at home up until the day prior to presentation when she suddenly fell.  She describes it as slumping down as she was attempting to go to the bathroom while using the walker.  This was a second episode on that day.  She denies any chest pain abdominal pain fever chills nausea or vomiting she denies any diarrhea prior to this she has been taking her medications regularly.  She admits to eating well but not drinking enough because she was concerned about overloading herself with fluid as she was told by a primary doctor.        On admission in the emergency room she was hemodynamically stable with a blood pressure 118/54 mmHg, heart rate of 66/min, respiratory rate of 18/min, temperature 36.5 °C, and was saturating 95% on room air.  Initial BMP showed sodium of 134 with a chloride of 108, bicarbonate of 16, BUN/creatinine of 113/4.67.  CBC showed a hemoglobin of 7.1.  His CT of the head was done which showed no acute intracranial abnormality.  Chest x-ray showed no acute cardiopulmonary disease and a right foot x-ray showed no acute osseous abnormality as well with mild multifocal age-related osteoarthritis changes.  Urine analysis showed no signs of UTI.  Patient has been admitted for further evaluation and management.  2/27: Patient was seen and examined.  Still complaining of generalized weakness.  Hemoglobin is 6.9 today after 1 units of packed red  blood cell transfusion.  Likely related to hemodilution.  I will transfuse 1 more unit of packed red blood cell today.  Creatinine is improving down to 4.0 today.  Continue IV bicarbonate and nephrologist.  Nephrology is on board and recommendations appreciated.  Seen by PT OT who recommend extended-care facility however patient is adamantly refusing SNF placement.  Repeat CBC and BMP in AM.  2/28: Patient was seen and examined.  She is clinically better today.  Creatinine is around her baseline at 3.47 today.  Hemoglobin is 8.0 today.  Currently awaiting authorization for placement in an extended care facility.  2/29: Patient was seen and examined.  After extensive conversation with nephrology decision to start patient on hemodialysis was made.  Patient to get a temporary dialysis catheter today and to commence hemodialysis afterwards.  Repeat RFP in AM.  3/1: Patient was seen and examined.  Tunneled dialysis catheter in situ and patient has had 2 sessions of hemodialysis so far.  Was complaining of chest pain at dialysis catheter site.  Catheter site does not look infected.  I will give as needed Tylenol for pain.  Trend RFP daily.  3/2: Patient continued to have significant bleeding overnight was given order for DDAVP today IV since nasal not available here.  Also local Tranexamic acid was given around the catheter and Monsel solution.  Pressure dressing was replaced.  Will continue to monitor.  Continues on Zyvox.  Will take over case today.  Case discussed with nurse at bedside I am familiar with this patient from previous admissions.  3.3: Patient's hemoglobin down to 6.5 today we will give a unit of packed red cells otherwise electrolytes are looking good patient is looking much better no longer bleeding from around tunneled catheter site.  Dressing remains in place.       Objective     Last Recorded Vitals  /77 (BP Location: Left arm, Patient Position: Lying)   Pulse 78   Temp 37.2 °C (98.9 °F)  (Temporal)   Resp 16   Wt 72 kg (158 lb 11.7 oz)   SpO2 90%   Intake/Output last 3 Shifts:  No intake or output data in the 24 hours ending 03/03/24 1242      Admission Weight  Weight: 72 kg (158 lb 11.7 oz) (02/25/24 2204)    Daily Weight  02/25/24 : 72 kg (158 lb 11.7 oz)    Image Results  IR CVC tunneled  Narrative: Interpreted By:  Bismark Crandall,   STUDY:  IR CVC TUNNELED;  2/29/2024 12:18 pm      INDICATION:  Signs/Symptoms:needs dialysis.      COMPARISON:  None.      ACCESSION NUMBER(S):  [id number]      ORDERING CLINICIAN:  KYRA PERAZA      TECHNIQUE:      CONSENT:  The patient/patient's POA/next of kin was informed of the nature of  the proposed procedure. The purposes, alternatives, risks, and  benefits were explained and discussed. All questions were answered  and consent was obtained.      RADIATION EXPOSURE:  Fluoroscopy time: 0.3 min.  Dose: 1.9 mGy.  Dose Area Product (DAP): 0.4      SEDATION:  Moderate conscious IV sedation services (supervision of  administration, induction, and maintenance) were provided by the  physician performing the procedure with intravenous versed 1 mg for  15 minutes. The physician was assisted by an independent trained  observer, an interventional radiology nurse, in the continuous  monitoring of patient level of consciousness and physiologic status.      MEDICATION/CONTRAST:  No additional.      TIME OUT:  A time out was performed immediately prior to procedure start with  the interventional team, correctly identifying the patient name, date  of birth, MRN, procedure, anatomy (including marking of site and  side), patient position, procedure consent form, relevant laboratory  and imaging test results, antibiotic administration, safety  precautions, and procedure-specific equipment needs.      COMPLICATIONS:  No immediate adverse events identified.      FINDINGS:  Maximum sterile barrier technique was implemented. In the recumbent  position, the patient was  positioned on the angiography table. The  right supraclavicular and infraclavicular cutaneous tissues were  prepared and draped in usual sterile manner. 1% Lidocaine was  instilled into the subcutaneous soft tissues at the access site and  tunnel tract for local anesthesia. Ultrasound images demonstrate a  patent  right internal jugular vein. Under direct ultrasound  guidance, a micropuncture needle was used to access the  right  internal jugular vein utilizing Seldinger and micropuncture  technique. An ultrasound digital spot image was acquired to confirm  location and stored on the  PACS.      After confirmation of location, a 018 [place] guidewire was  introduced through the access needle to secure and confirm location.  The guidewire was advanced into the inferior vena cava under  intermittent fluoroscopy. There was subsequent exchange and placement  over the wire for a coaxial 5-Hungarian dilator sheath system. The inner  dilator and wire were removed with the sheath left in place. A 035  Glide guidewire was introduced to the sheath with access into the  inferior vena cava. Venous access site soft tissue dilation was  performed to an eventual  12-Hungarian peel-away sheath dilator system.  A tunnel tract was created from the  right infraclavicular chest to  the venous access site. After continuity of the tunnel tract and  venous access site was obtained, a  19 cm tunneled hemodialysis  catheter was then placed with tract continuity and its central  catheter tip(s) to reside at the caval-atrial junction. A  fluoroscopic spot image of the chest was acquired in the AP  projection to confirm optimal location.      The catheter ports were aspirated and flushed without resistance with  normal saline. The catheter ports were then charged with  high-concentration heparin. The dermatotomy venous access site was  closed with 3-0 absorbable sutures and dressed with sterile bandage.  The catheter was secured at the  tunnel access site with a  purse-string 2-0 polypropylene suture and dressed with sterile  bandage.      The patient tolerated the procedure without complication.      Impression: 1.  Uncomplicated and technically successful placement of an  indwelling  right tunneled hemodialysis catheter. Catheter is ready  for immediate use.          Performed and dictated at [facility].      MACRO:  None      Signed by: Bismark Crandall 2/29/2024 12:27 PM  Dictation workstation:   [id number]      Physical Exam  al.   Relevant Results               Assessment/Plan      EXAM:    Constitutional: A little pale today but patient overall looks good  Head and facial: Slightly pale dialysis catheter right upper chest dressing in place no further bleeding.    Neck: Neck supple    Lungs: Breath sounds bilaterally    Heart: Regular heart rate and frankie    Abdomen: Nontender    Pelvis/: No flank tenderness    Extremities: Some peripheral edema noted stocking-glove neuropathy noted    Neurologic: Stocking glove neuropathy noted no other focal deficits    Dermatologic: Dialysis catheter is in place some dark clot is underneath the adhesive dressing but looks good.    Psychiatric/behavioral: Patient is pleasant appropriate and conversant today.    Scheduled medications  allopurinol, 100 mg, oral, Daily  amLODIPine, 5 mg, oral, Daily  [Held by provider] aspirin, 81 mg, oral, Daily  butalbital-acetaminophen-caff, 2 tablet, oral, Once  cholecalciferol, 2,000 Units, oral, Daily  cyanocobalamin, 1,000 mcg, oral, Daily  [Held by provider] heparin (porcine), 5,000 Units, subcutaneous, q8h  heparin, 2,000 Units, intra-catheter, After Dialysis  heparin, 2,000 Units, intra-catheter, After Dialysis  insulin lispro, 0-5 Units, subcutaneous, TID with meals  linezolid, 600 mg, oral, BID  metoprolol tartrate, 25 mg, oral, Daily  pioglitazone, 15 mg, oral, Daily  pravastatin, 40 mg, oral, Nightly  pregabalin, 75 mg, oral,  Every other day  sevelamer carbonate, 800 mg, oral, TID with meals  SITagliptin phosphate, 25 mg, oral, Daily  SUMAtriptan, 100 mg, oral, Once      Continuous medications     PRN medications  PRN medications: acetaminophen, acetaminophen, colchicine, dextrose 10 % in water (D10W), dextrose, ferric subsulfate, glucagon, meclizine, ondansetron, sennosides-docusate sodium          Principal Problem:    Acute kidney injury (nontraumatic) (CMS/HCC)    Persistent bleeding from tunneled dialysis right upper chest  Acute kidney injury on top of chronic kidney disease  Acute anemia blood loss on top of anemia of chronic disease and myelodysplastic syndrome  Chronic diastolic heart failure  Hypertension  COPD without exacerbation  Type 2 diabetes mellitus   deconditioning and weakness  Acute frontal headache  DVT prophylaxis-SCDs no anticoagulation      Status post interventions-DDAVP IV,Tranexamic acid and Monsel's topical  Oral Zyvox 600 p.o. twice daily  Hemodialysis per nephrology  Supplement iron as needed  Procrit per nephrology  Transfuse as needed  Allopurinol 100 mg daily  Colchicine as needed  Lyrica 75 mg every other day  Starlix 25 mg daily  Actos 15 mg daily  Subcu sliding scale insulin 3 times daily AC  Pravastatin 40 mg daily  Metoprolol 25 mg daily  Amlodipine 5 mg daily  Calcitriol 2000 units daily  B12 1000 mcg subcu daily  Sevelamer carbonate 800 mg 3 times daily with meals  As needed Tylenol, colchicine, iron, meclizine, Zofran, Senokot S  Avoid anticoagulants for now  Dose of Fioricet and Imitrex p.o. x 1  PT and OT  Plan discharge [facility]   check labs in a.m  See orders for complete plan        Spent 35 minutes in the follow-up management of this patient today    Warren Head MD

## 2024-03-03 NOTE — PROGRESS NOTES
"      Nephrology Progress Note      Nephrology following for ESTEE on CKD IV.     Events over night:   TDC bleeding stopped        /74 (BP Location: Left arm, Patient Position: Lying)   Pulse 87   Temp 36.6 °C (97.9 °F) (Temporal)   Resp 18   Ht 1.549 m (5' 0.98\")   Wt 72 kg (158 lb 11.7 oz)   SpO2 92%   BMI 30.01 kg/m²     Input / Output:  24 HR:   Intake/Output Summary (Last 24 hours) at 3/3/2024 1002  Last data filed at 3/2/2024 1142  Gross per 24 hour   Intake 50 ml   Output --   Net 50 ml         Physical Exam   Alert and oriented x 3 NAD  Neck: no JVD  CV: RRR  Lungs: CTA bilaterally  Abd: soft, NT, ND   Ext: minimal lower extremity edema    Scheduled medications  allopurinol, 100 mg, oral, Daily  amLODIPine, 5 mg, oral, Daily  [Held by provider] aspirin, 81 mg, oral, Daily  cholecalciferol, 2,000 Units, oral, Daily  cyanocobalamin, 1,000 mcg, oral, Daily  [Held by provider] heparin (porcine), 5,000 Units, subcutaneous, q8h  heparin, 2,000 Units, intra-catheter, After Dialysis  heparin, 2,000 Units, intra-catheter, After Dialysis  insulin lispro, 0-5 Units, subcutaneous, TID with meals  linezolid, 600 mg, oral, BID  metoprolol tartrate, 25 mg, oral, Daily  pioglitazone, 15 mg, oral, Daily  pravastatin, 40 mg, oral, Nightly  pregabalin, 75 mg, oral, Every other day  sevelamer carbonate, 800 mg, oral, TID with meals  SITagliptin phosphate, 25 mg, oral, Daily      Continuous medications       PRN medications  PRN medications: acetaminophen, acetaminophen, colchicine, dextrose 10 % in water (D10W), dextrose, ferric subsulfate, glucagon, meclizine, ondansetron, sennosides-docusate sodium   Results from last 7 days   Lab Units 03/03/24  0418 02/28/24  0548 02/27/24  0318   SODIUM mmol/L 137   < > 136   POTASSIUM mmol/L 4.3   < > 5.4*   CHLORIDE mmol/L 102   < > 112*   CO2 mmol/L 31   < > 19*   BUN mg/dL 40*   < > 96*   CREATININE mg/dL 2.91*   < > 4.00*   CALCIUM mg/dL 8.6   < > 8.5*   PROTEIN TOTAL g/dL  " --   --  5.2*   BILIRUBIN TOTAL mg/dL  --   --  0.4   ALK PHOS U/L  --   --  38   ALT U/L  --   --  10   AST U/L  --   --  16   GLUCOSE mg/dL 97   < > 126*    < > = values in this interval not displayed.        Results from last 7 days   Lab Units 03/03/24  0418 02/25/24  2319   MAGNESIUM mg/dL 1.76 2.12        Results from last 7 days   Lab Units 03/03/24  0418 03/02/24  0347 03/01/24  0436   WBC AUTO x10*3/uL 3.3* 2.6* 3.6*   HEMOGLOBIN g/dL 6.5* 7.5* 7.8*   HEMATOCRIT % 21.2* 22.9* 24.6*   PLATELETS AUTO x10*3/uL 75* 92* 115*          Assessment & Plan:     Assessment:   Patient is 78 y.o. female who is admitted to hospital for weakness. Nephrology consulted in view of ESTEE on CKD IV.     Progression of CKD stage IV now ESRD  - Patient does have a history of requiring dialysis in the past from Dec 2021 to March 2022  - Patient's creatinine has been worsening over past month, it was 3.07 on 2/18 upon previous discharge. Prior to that her baseline was around 2.6.  - Creatinine upon admission is 4.67,   - Fatigue and weakness may be secondary to uremia, patient experiencing some myoclonic jerks and asterixis as well  - UA with no evidence of infection, 1+ protein which is not new  - No recent hypotension or nephrotoxins  -Initiated dialysis on 2- 29-24 for ongoing uremia    Acute on chronic anemia  - Secondary to CKD and MDS  - Hb  improved  - On high-dose Procrit weekly  - Receives intermittent transfusions as needed     Hyperkalemia  - mild  - no EKG changes     Hyperphosphatemia  - phosphate binder     Metabolic acidosis  -Corrected with dialysis     Volume status  - appears euvolemic  - minimal lower extremity swelling  - no shortness of breath, chest x-ray clear        Recommendations:     -Will hold off on third dialysis treatment, due to the TDC exit site bleeding.  Will plan third treatment on 3/4/2024  - Patient has outpatient chair in [place], however she is going to be going to a SNF in [place] so I  have given orders to the unit closer to the SNF  - Discussed pursuing AV fistula as soon as possible    Please message me through EPIC chat with any questions or concerns.     Júnior Ramirez MD      3/3/2024  10:02 AM     [facility]    [address] [place] [address], [address]   [place] [address]  Office: [phone]

## 2024-03-03 NOTE — CARE PLAN
The patient's goals for the shift include      The clinical goals for the shift include pt. will remain safe from injury or falls throughout this shift.    Over the shift, the patient did not make progress toward the following goals. Barriers to progression include . Recommendations to address these barriers include   Problem: Skin  Goal: Participates in plan/prevention/treatment measures  Outcome: Progressing  Goal: Promote/optimize nutrition  Outcome: Progressing  Goal: Promote skin healing  Outcome: Progressing  Flowsheets (Taken 3/3/2024 9122)  Promote skin healing: Turn/reposition every 2 hours/use positioning/transfer devices     Problem: Fall/Injury  Goal: Not fall by end of shift  Outcome: Progressing  Goal: Be free from injury by end of the shift  Outcome: Progressing  Goal: Verbalize understanding of personal risk factors for fall in the hospital  Outcome: Progressing  Goal: Verbalize understanding of risk factor reduction measures to prevent injury from fall in the home  Outcome: Progressing  Goal: Use assistive devices by end of the shift  Outcome: Progressing  Goal: Pace activities to prevent fatigue by end of the shift  Outcome: Progressing     Problem: Pain  Goal: Takes deep breaths with improved pain control throughout the shift  Outcome: Progressing  Goal: Turns in bed with improved pain control throughout the shift  Outcome: Progressing  Goal: Walks with improved pain control throughout the shift  Outcome: Progressing  Goal: Performs ADL's with improved pain control throughout shift  Outcome: Progressing  Goal: Participates in PT with improved pain control throughout the shift  Outcome: Progressing  Goal: Free from opioid side effects throughout the shift  Outcome: Progressing  Goal: Free from acute confusion related to pain meds throughout the shift  Outcome: Progressing     Problem: Pain - Adult  Goal: Verbalizes/displays adequate comfort level or baseline comfort level  Outcome: Progressing      Problem: Safety - Adult  Goal: Free from fall injury  Outcome: Progressing     Problem: Discharge Planning  Goal: Discharge to home or other facility with appropriate resources  Outcome: Progressing     Problem: Chronic Conditions and Co-morbidities  Goal: Patient's chronic conditions and co-morbidity symptoms are monitored and maintained or improved  Outcome: Progressing     Problem: Diabetes  Goal: Achieve decreasing blood glucose levels by end of shift  Outcome: Progressing  Goal: Increase stability of blood glucose readings by end of shift  Outcome: Progressing  Goal: Decrease in ketones present in urine by end of shift  Outcome: Progressing  Goal: Maintain electrolyte levels within acceptable range throughout shift  Outcome: Progressing  Goal: Maintain glucose levels >70mg/dl to <250mg/dl throughout shift  Outcome: Progressing  Goal: No changes in neurological exam by end of shift  Outcome: Progressing  Goal: Learn about and adhere to nutrition recommendations by end of shift  Outcome: Progressing  Goal: Vital signs within normal range for age by end of shift  Outcome: Progressing  Goal: Increase self care and/or family involovement by end of shift  Outcome: Progressing  Goal: Receive DSME education by end of shift  Outcome: Progressing   .

## 2024-03-03 NOTE — SIGNIFICANT EVENT
No additional bleeding from dialysis catheter site but Hg 6.5 this morning so will transfuse 1 unit.

## 2024-03-04 ENCOUNTER — APPOINTMENT (OUTPATIENT)
Dept: HEMATOLOGY/ONCOLOGY | Facility: CLINIC | Age: 79
End: 2024-03-04
Payer: MEDICARE

## 2024-03-04 ENCOUNTER — APPOINTMENT (OUTPATIENT)
Dept: DIALYSIS | Facility: HOSPITAL | Age: 79
End: 2024-03-04
Payer: MEDICARE

## 2024-03-04 DIAGNOSIS — E11.65 TYPE 2 DIABETES MELLITUS WITH HYPERGLYCEMIA, WITHOUT LONG-TERM CURRENT USE OF INSULIN (MULTI): ICD-10-CM

## 2024-03-04 DIAGNOSIS — J44.9 COPD WITHOUT EXACERBATION (MULTI): Chronic | ICD-10-CM

## 2024-03-04 DIAGNOSIS — I50.32 CHRONIC DIASTOLIC HEART FAILURE OF UNKNOWN ETIOLOGY (MULTI): Primary | ICD-10-CM

## 2024-03-04 LAB
ANION GAP SERPL CALC-SCNC: 13 MMOL/L (ref 10–20)
BASOPHILS # BLD AUTO: 0.03 X10*3/UL (ref 0–0.1)
BASOPHILS NFR BLD AUTO: 0.3 %
BUN SERPL-MCNC: 48 MG/DL (ref 6–23)
CALCIUM SERPL-MCNC: 9 MG/DL (ref 8.6–10.3)
CHLORIDE SERPL-SCNC: 98 MMOL/L (ref 98–107)
CO2 SERPL-SCNC: 27 MMOL/L (ref 21–32)
CREAT SERPL-MCNC: 2.59 MG/DL (ref 0.5–1.05)
EGFRCR SERPLBLD CKD-EPI 2021: 18 ML/MIN/1.73M*2
EOSINOPHIL # BLD AUTO: 0.09 X10*3/UL (ref 0–0.4)
EOSINOPHIL NFR BLD AUTO: 1 %
ERYTHROCYTE [DISTWIDTH] IN BLOOD BY AUTOMATED COUNT: 19.9 % (ref 11.5–14.5)
GLUCOSE BLD MANUAL STRIP-MCNC: 113 MG/DL (ref 74–99)
GLUCOSE BLD MANUAL STRIP-MCNC: 132 MG/DL (ref 74–99)
GLUCOSE BLD MANUAL STRIP-MCNC: 136 MG/DL (ref 74–99)
GLUCOSE SERPL-MCNC: 122 MG/DL (ref 74–99)
HCT VFR BLD AUTO: 27.7 % (ref 36–46)
HGB BLD-MCNC: 9 G/DL (ref 12–16)
IMM GRANULOCYTES # BLD AUTO: 0.03 X10*3/UL (ref 0–0.5)
IMM GRANULOCYTES NFR BLD AUTO: 0.3 % (ref 0–0.9)
LYMPHOCYTES # BLD AUTO: 1.1 X10*3/UL (ref 0.8–3)
LYMPHOCYTES NFR BLD AUTO: 12.5 %
MAGNESIUM SERPL-MCNC: 1.66 MG/DL (ref 1.6–2.4)
MCH RBC QN AUTO: 29.9 PG (ref 26–34)
MCHC RBC AUTO-ENTMCNC: 32.5 G/DL (ref 32–36)
MCV RBC AUTO: 92 FL (ref 80–100)
MONOCYTES # BLD AUTO: 0.44 X10*3/UL (ref 0.05–0.8)
MONOCYTES NFR BLD AUTO: 5 %
NEUTROPHILS # BLD AUTO: 7.11 X10*3/UL (ref 1.6–5.5)
NEUTROPHILS NFR BLD AUTO: 80.9 %
NRBC BLD-RTO: 0 /100 WBCS (ref 0–0)
PLATELET # BLD AUTO: 77 X10*3/UL (ref 150–450)
POTASSIUM SERPL-SCNC: 4.5 MMOL/L (ref 3.5–5.3)
RBC # BLD AUTO: 3.01 X10*6/UL (ref 4–5.2)
SODIUM SERPL-SCNC: 133 MMOL/L (ref 136–145)
WBC # BLD AUTO: 8.8 X10*3/UL (ref 4.4–11.3)

## 2024-03-04 PROCEDURE — 8010000001 HC DIALYSIS - HEMODIALYSIS PER DAY

## 2024-03-04 PROCEDURE — 1100000001 HC PRIVATE ROOM DAILY

## 2024-03-04 PROCEDURE — 36415 COLL VENOUS BLD VENIPUNCTURE: CPT | Performed by: INTERNAL MEDICINE

## 2024-03-04 PROCEDURE — 82947 ASSAY GLUCOSE BLOOD QUANT: CPT

## 2024-03-04 PROCEDURE — 80048 BASIC METABOLIC PNL TOTAL CA: CPT | Performed by: INTERNAL MEDICINE

## 2024-03-04 PROCEDURE — 2500000004 HC RX 250 GENERAL PHARMACY W/ HCPCS (ALT 636 FOR OP/ED): Performed by: INTERNAL MEDICINE

## 2024-03-04 PROCEDURE — 85025 COMPLETE CBC W/AUTO DIFF WBC: CPT | Performed by: INTERNAL MEDICINE

## 2024-03-04 PROCEDURE — 83735 ASSAY OF MAGNESIUM: CPT | Performed by: INTERNAL MEDICINE

## 2024-03-04 PROCEDURE — 2500000002 HC RX 250 W HCPCS SELF ADMINISTERED DRUGS (ALT 637 FOR MEDICARE OP, ALT 636 FOR OP/ED): Performed by: INTERNAL MEDICINE

## 2024-03-04 PROCEDURE — G0180 MD CERTIFICATION HHA PATIENT: HCPCS | Performed by: NURSE PRACTITIONER

## 2024-03-04 PROCEDURE — 97116 GAIT TRAINING THERAPY: CPT | Mod: GP,CQ

## 2024-03-04 PROCEDURE — 2500000001 HC RX 250 WO HCPCS SELF ADMINISTERED DRUGS (ALT 637 FOR MEDICARE OP): Performed by: INTERNAL MEDICINE

## 2024-03-04 PROCEDURE — 97530 THERAPEUTIC ACTIVITIES: CPT | Mod: GP,CQ

## 2024-03-04 PROCEDURE — 1090000001 HH PPS REVENUE CREDIT

## 2024-03-04 PROCEDURE — 99233 SBSQ HOSP IP/OBS HIGH 50: CPT | Performed by: INTERNAL MEDICINE

## 2024-03-04 PROCEDURE — 2500000005 HC RX 250 GENERAL PHARMACY W/O HCPCS: Performed by: INTERNAL MEDICINE

## 2024-03-04 PROCEDURE — 1090000002 HH PPS REVENUE DEBIT

## 2024-03-04 RX ORDER — METOCLOPRAMIDE HYDROCHLORIDE 5 MG/ML
10 INJECTION INTRAMUSCULAR; INTRAVENOUS EVERY 6 HOURS SCHEDULED
Status: COMPLETED | OUTPATIENT
Start: 2024-03-04 | End: 2024-03-04

## 2024-03-04 RX ADMIN — HEPARIN SODIUM 2000 UNITS: 1000 INJECTION INTRAVENOUS; SUBCUTANEOUS at 12:12

## 2024-03-04 RX ADMIN — CYANOCOBALAMIN TAB 1000 MCG 1000 MCG: 1000 TAB at 13:42

## 2024-03-04 RX ADMIN — THIAMINE HYDROCHLORIDE 200 MG: 100 INJECTION, SOLUTION INTRAMUSCULAR; INTRAVENOUS at 23:25

## 2024-03-04 RX ADMIN — HEPARIN SODIUM 2000 UNITS: 1000 INJECTION INTRAVENOUS; SUBCUTANEOUS at 12:13

## 2024-03-04 RX ADMIN — SEVELAMER CARBONATE 800 MG: 800 TABLET, FILM COATED ORAL at 17:18

## 2024-03-04 RX ADMIN — METOPROLOL TARTRATE 25 MG: 25 TABLET, FILM COATED ORAL at 13:42

## 2024-03-04 RX ADMIN — SEVELAMER CARBONATE 800 MG: 800 TABLET, FILM COATED ORAL at 13:42

## 2024-03-04 RX ADMIN — ONDANSETRON 4 MG: 2 INJECTION INTRAMUSCULAR; INTRAVENOUS at 07:19

## 2024-03-04 RX ADMIN — Medication 2000 UNITS: at 13:42

## 2024-03-04 RX ADMIN — SITAGLIPTIN 25 MG: 50 TABLET, FILM COATED ORAL at 13:42

## 2024-03-04 RX ADMIN — PREGABALIN 75 MG: 75 CAPSULE ORAL at 13:42

## 2024-03-04 RX ADMIN — AMLODIPINE BESYLATE 5 MG: 5 TABLET ORAL at 13:42

## 2024-03-04 RX ADMIN — PIOGLITAZONE HYDROCHLORIDE 15 MG: 15 TABLET ORAL at 13:42

## 2024-03-04 RX ADMIN — ALLOPURINOL 100 MG: 100 TABLET ORAL at 13:42

## 2024-03-04 RX ADMIN — Medication 4 L/MIN: at 07:51

## 2024-03-04 RX ADMIN — METOCLOPRAMIDE HYDROCHLORIDE 10 MG: 5 INJECTION INTRAMUSCULAR; INTRAVENOUS at 17:18

## 2024-03-04 RX ADMIN — METOCLOPRAMIDE HYDROCHLORIDE 10 MG: 5 INJECTION INTRAMUSCULAR; INTRAVENOUS at 23:25

## 2024-03-04 ASSESSMENT — COGNITIVE AND FUNCTIONAL STATUS - GENERAL
TURNING FROM BACK TO SIDE WHILE IN FLAT BAD: A LOT
MOVING FROM LYING ON BACK TO SITTING ON SIDE OF FLAT BED WITH BEDRAILS: A LOT
MOVING FROM LYING ON BACK TO SITTING ON SIDE OF FLAT BED WITH BEDRAILS: A LOT
WALKING IN HOSPITAL ROOM: A LOT
WALKING IN HOSPITAL ROOM: A LOT
MOBILITY SCORE: 11
DAILY ACTIVITIY SCORE: 15
DRESSING REGULAR LOWER BODY CLOTHING: A LOT
CLIMB 3 TO 5 STEPS WITH RAILING: TOTAL
MOVING TO AND FROM BED TO CHAIR: A LOT
STANDING UP FROM CHAIR USING ARMS: A LOT
STANDING UP FROM CHAIR USING ARMS: A LOT
MOBILITY SCORE: 11
CLIMB 3 TO 5 STEPS WITH RAILING: TOTAL
DRESSING REGULAR UPPER BODY CLOTHING: A LOT
PERSONAL GROOMING: A LITTLE
HELP NEEDED FOR BATHING: A LOT
TOILETING: A LOT
MOVING TO AND FROM BED TO CHAIR: A LOT
TURNING FROM BACK TO SIDE WHILE IN FLAT BAD: A LOT

## 2024-03-04 ASSESSMENT — PAIN SCALES - GENERAL
PAINLEVEL_OUTOF10: 0 - NO PAIN
PAINLEVEL_OUTOF10: 0 - NO PAIN

## 2024-03-04 ASSESSMENT — PAIN - FUNCTIONAL ASSESSMENT
PAIN_FUNCTIONAL_ASSESSMENT: 0-10
PAIN_FUNCTIONAL_ASSESSMENT: NO/DENIES PAIN
PAIN_FUNCTIONAL_ASSESSMENT: 0-10

## 2024-03-04 NOTE — PROGRESS NOTES
Occupational Therapy                 Therapy Communication Note    Patient Name: Tana Hargrove  MRN: [id number]  Today's Date: 3/4/2024     Discipline: Occupational Therapy    Missed Visit Reason: Missed Visit Reason: Patient refused    Missed Time: Attempted OT tx. Pt back from dialysis. Appears uncomfortable and voiced she does not feel well. Voiced feeling wiped out and run down. Declined participation at this time.

## 2024-03-04 NOTE — PROGRESS NOTES
Occupational Therapy                 Therapy Communication Note    Patient Name: Tana Hargrove  MRN: [id number]  Today's Date: 3/4/2024     Discipline: Occupational Therapy    Missed Visit Reason: Missed Visit Reason: Patient in a medical procedure    Missed Time: Attempted treatment. Pt off unit for dialysis. Unavailable to see for OT.

## 2024-03-04 NOTE — PROGRESS NOTES
Tana Hargrove is a 78 y.o. female on day 7 of admission presenting with Acute kidney injury (nontraumatic) (CMS/HCC).      Subjective   Tana Hargrove is a 78 y.o. female with PMHx of HTN, HLD, chronic diastolic heart failure, COPD (no baseline O2), CKD, NIDDM-II, MDS with chronic anemia requiring Procrit and intermittent PRBC transfusions, OA, GERD, who presents to the emergency room with generalized fatigue and weakness.  Patient was recently discharged about 1 week prior to presentation after treatment for urinary tract infection.  She has been feeling much better at home up until the day prior to presentation when she suddenly fell.  She describes it as slumping down as she was attempting to go to the bathroom while using the walker.  This was a second episode on that day.  She denies any chest pain abdominal pain fever chills nausea or vomiting she denies any diarrhea prior to this she has been taking her medications regularly.  She admits to eating well but not drinking enough because she was concerned about overloading herself with fluid as she was told by a primary doctor.        On admission in the emergency room she was hemodynamically stable with a blood pressure 118/54 mmHg, heart rate of 66/min, respiratory rate of 18/min, temperature 36.5 °C, and was saturating 95% on room air.  Initial BMP showed sodium of 134 with a chloride of 108, bicarbonate of 16, BUN/creatinine of 113/4.67.  CBC showed a hemoglobin of 7.1.  His CT of the head was done which showed no acute intracranial abnormality.  Chest x-ray showed no acute cardiopulmonary disease and a right foot x-ray showed no acute osseous abnormality as well with mild multifocal age-related osteoarthritis changes.  Urine analysis showed no signs of UTI.  Patient has been admitted for further evaluation and management.  2/27: Patient was seen and examined.  Still complaining of generalized weakness.  Hemoglobin is 6.9 today after 1 units of packed red  blood cell transfusion.  Likely related to hemodilution.  I will transfuse 1 more unit of packed red blood cell today.  Creatinine is improving down to 4.0 today.  Continue IV bicarbonate and nephrologist.  Nephrology is on board and recommendations appreciated.  Seen by PT OT who recommend extended-care facility however patient is adamantly refusing SNF placement.  Repeat CBC and BMP in AM.  2/28: Patient was seen and examined.  She is clinically better today.  Creatinine is around her baseline at 3.47 today.  Hemoglobin is 8.0 today.  Currently awaiting authorization for placement in an extended care facility.  2/29: Patient was seen and examined.  After extensive conversation with nephrology decision to start patient on hemodialysis was made.  Patient to get a temporary dialysis catheter today and to commence hemodialysis afterwards.  Repeat RFP in AM.  3/1: Patient was seen and examined.  Tunneled dialysis catheter in situ and patient has had 2 sessions of hemodialysis so far.  Was complaining of chest pain at dialysis catheter site.  Catheter site does not look infected.  I will give as needed Tylenol for pain.  Trend RFP daily.  3/2: Patient continued to have significant bleeding overnight was given order for DDAVP today IV since nasal not available here.  Also local Tranexamic acid was given around the catheter and Monsel solution.  Pressure dressing was replaced.  Will continue to monitor.  Continues on Zyvox.  Will take over case today.  Case discussed with nurse at bedside I am familiar with this patient from previous admissions.  3.3: Patient's hemoglobin down to 6.5 today we will give a unit of packed red cells otherwise electrolytes are looking good patient is looking much better no longer bleeding from around tunneled catheter site.  Dressing remains in place.    3/4: Patient had episode of nausea and vomiting this morning will at this time add some IV thiamine Short course.  Continue as needed nausea  meds.  Patient with platelets dropped to 77 today.  Has completed 14-day course of Zyvox so will stop Zyvox.  Trying to minimize medication burden.  Dialysis catheter appears to have stopped bleeding.  Increase activities possibly discharge to [facility] care of [place] tomorrow.           Objective     Last Recorded Vitals  /73   Pulse 85   Temp 36.2 °C (97.1 °F)   Resp 18   Wt 72 kg (158 lb 11.7 oz)   SpO2 93%   Intake/Output last 3 Shifts:    Intake/Output Summary (Last 24 hours) at 3/4/2024 1452  Last data filed at 3/4/2024 1223  Gross per 24 hour   Intake 1400 ml   Output 1370 ml   Net 30 ml         Admission Weight  Weight: 72 kg (158 lb 11.7 oz) (02/25/24 2204)    Daily Weight  02/25/24 : 72 kg (158 lb 11.7 oz)    Image Results  XR chest 1 view  Narrative: Interpreted By:  Pauilne Adame,   STUDY:  XR CHEST 1 VIEW;  3/3/2024 9:25 pm      INDICATION:  Signs/Symptoms:Acute hypoxia.      COMPARISON:  02/25/2024      ACCESSION NUMBER(S):  [id number]      ORDERING CLINICIAN:  GRANT CHARLTON      TECHNIQUE:  Portable AP upright      FINDINGS:  Right internal jugular dual lumen catheter tip projects in the right  atrium. Cardiac silhouette appears normal in size. Pulmonary  vasculature is congested and there is mild interstitial edema, new  since the prior study. Elevation of the right hemidiaphragm is  unchanged. No pleural effusion is noted. Osseous structures show no  acute interval change.      Impression: Pulmonary vascular congestion and interstitial edema      MACRO:  None.      Signed by: Pauline Adame 3/4/2024 8:12 AM  Dictation workstation:   [id number]      Physical Exam  al.   Relevant Results               Assessment/Plan      EXAM:    Constitutional: A little pale today but patient overall looks good  Head and facial: Slightly pale dialysis catheter right upper chest dressing in place no further bleeding.    Neck: Neck supple    Lungs: Breath sounds bilaterally    Heart: Regular  heart rate and frankie    Abdomen: Nontender    Pelvis/: No flank tenderness    Extremities: Some peripheral edema noted stocking-glove neuropathy noted    Neurologic: Stocking glove neuropathy noted no other focal deficits    Dermatologic: Dialysis catheter is in place some dark clot is underneath the adhesive dressing but looks good.    Psychiatric/behavioral: Patient is pleasant appropriate and conversant today.    Scheduled medications  allopurinol, 100 mg, oral, Daily  amLODIPine, 5 mg, oral, Daily  [Held by provider] aspirin, 81 mg, oral, Daily  cholecalciferol, 2,000 Units, oral, Daily  cyanocobalamin, 1,000 mcg, oral, Daily  [Held by provider] heparin (porcine), 5,000 Units, subcutaneous, q8h  heparin, 2,000 Units, intra-catheter, After Dialysis  heparin, 2,000 Units, intra-catheter, After Dialysis  insulin lispro, 0-5 Units, subcutaneous, TID with meals  metoprolol tartrate, 25 mg, oral, Daily  pioglitazone, 15 mg, oral, Daily  pravastatin, 40 mg, oral, Nightly  pregabalin, 75 mg, oral, Every other day  sevelamer carbonate, 800 mg, oral, TID with meals  SITagliptin phosphate, 25 mg, oral, Daily  thiamine, 200 mg, intravenous, Daily      Continuous medications     PRN medications  PRN medications: acetaminophen, acetaminophen, colchicine, dextrose 10 % in water (D10W), dextrose, ferric subsulfate, glucagon, meclizine, ondansetron, oxygen, sennosides-docusate sodium          Principal Problem:    Acute kidney injury (nontraumatic) (CMS/HCC)    Persistent bleeding from tunneled dialysis right upper chest  Acute kidney injury on top of chronic kidney disease  Acute anemia blood loss on top of anemia of chronic disease and myelodysplastic syndrome  Chronic diastolic heart failure  Hypertension  COPD without exacerbation  Type 2 diabetes mellitus   deconditioning and weakness  Episode of nausea and vomiting today.  DVT prophylaxis-SCDs no anticoagulation      Status post interventions-DDAVP IV,Tranexamic acid  and Monsel's topical  Stop Zyvox  Hemodialysis per nephrology  Supplement iron as needed  Procrit per nephrology  Transfuse as needed  Allopurinol 100 mg daily  Colchicine as needed  Lyrica 75 mg every other day  Januvia 25 mg daily  Actos 15 mg daily  Subcu sliding scale insulin 3 times daily AC  Pravastatin 40 mg daily  Metoprolol 25 mg daily  Amlodipine 5 mg daily  Calcitriol 2000 units daily  B12 1000 mcg subcu daily  Sevelamer carbonate 800 mg 3 times daily with meals  As needed Tylenol, colchicine, iron, meclizine, Zofran, Senokot S  Dose of Reglan  Pulsed dosing of thiamine  Avoid anticoagulants for now  PT and OT  Plan discharge [facility]   check labs in a.m  See orders for complete plan        Spent 35 minutes in the follow-up management of this patient today    Warren Head MD

## 2024-03-04 NOTE — PRE-PROCEDURE NOTE
Report from Sending RN:    Report From: Silvia Edwards  Recent Surgery of Procedure: No  Baseline Level of Consciousness (LOC): A&O x4  Oxygen Use: Yes, 3liters  Type: Nasal  Diabetic: Yes  Last BP Med Given Day of Dialysis: SEE MAR  Last Pain Med Given: SEE MAR  Lab Tests to be Obtained with Dialysis: No  Blood Transfusion to be Given During Dialysis: No  Available IV Access: Yes  Medications to be Administered During Dialysis: No  Continuous IV Infusion Running: No  Restraints on Currently or in the Last 24 Hours: No  Hand-Off Communication: VINCENT AVALOS

## 2024-03-04 NOTE — CARE PLAN
The patient's goals for the shift include      The clinical goals for the shift include pt will remain free from fall or injury      Problem: Skin  Goal: Participates in plan/prevention/treatment measures  Outcome: Progressing  Flowsheets (Taken 3/4/2024 0830)  Participates in plan/prevention/treatment measures: Elevate heels  Goal: Promote/optimize nutrition  Outcome: Progressing  Flowsheets (Taken 3/4/2024 0830)  Promote/optimize nutrition: Monitor/record intake including meals  Goal: Promote skin healing  Outcome: Progressing  Flowsheets (Taken 3/4/2024 0830)  Promote skin healing: Turn/reposition every 2 hours/use positioning/transfer devices     Problem: Fall/Injury  Goal: Not fall by end of shift  Outcome: Progressing  Goal: Be free from injury by end of the shift  Outcome: Progressing  Goal: Verbalize understanding of personal risk factors for fall in the hospital  Outcome: Progressing  Goal: Verbalize understanding of risk factor reduction measures to prevent injury from fall in the home  Outcome: Progressing  Goal: Use assistive devices by end of the shift  Outcome: Progressing  Goal: Pace activities to prevent fatigue by end of the shift  Outcome: Progressing     Problem: Pain  Goal: Takes deep breaths with improved pain control throughout the shift  Outcome: Progressing  Goal: Turns in bed with improved pain control throughout the shift  Outcome: Progressing  Goal: Walks with improved pain control throughout the shift  Outcome: Progressing  Goal: Performs ADL's with improved pain control throughout shift  Outcome: Progressing  Goal: Participates in PT with improved pain control throughout the shift  Outcome: Progressing  Goal: Free from opioid side effects throughout the shift  Outcome: Progressing  Goal: Free from acute confusion related to pain meds throughout the shift  Outcome: Progressing     Problem: Pain - Adult  Goal: Verbalizes/displays adequate comfort level or baseline comfort level  Outcome:  Progressing     Problem: Safety - Adult  Goal: Free from fall injury  Outcome: Progressing     Problem: Discharge Planning  Goal: Discharge to home or other facility with appropriate resources  Outcome: Progressing     Problem: Chronic Conditions and Co-morbidities  Goal: Patient's chronic conditions and co-morbidity symptoms are monitored and maintained or improved  Outcome: Progressing     Problem: Diabetes  Goal: Achieve decreasing blood glucose levels by end of shift  Outcome: Progressing  Goal: Increase stability of blood glucose readings by end of shift  Outcome: Progressing  Goal: Decrease in ketones present in urine by end of shift  Outcome: Progressing  Goal: Maintain electrolyte levels within acceptable range throughout shift  Outcome: Progressing  Goal: Maintain glucose levels >70mg/dl to <250mg/dl throughout shift  Outcome: Progressing  Goal: No changes in neurological exam by end of shift  Outcome: Progressing  Goal: Learn about and adhere to nutrition recommendations by end of shift  Outcome: Progressing  Goal: Vital signs within normal range for age by end of shift  Outcome: Progressing  Goal: Increase self care and/or family involovement by end of shift  Outcome: Progressing  Goal: Receive DSME education by end of shift  Outcome: Progressing

## 2024-03-04 NOTE — PROGRESS NOTES
Physical Therapy    Physical Therapy Treatment    Patient Name: Tana Hargrove  MRN: [id number]  Today's Date: 3/4/2024  Time Calculation  Start Time: 0730  Stop Time: 0757  Time Calculation (min): 27 min       Assessment/Plan   PT Assessment  End of Session Communication: Bedside nurse  Assessment Comment: patient very weak, losing balance on EOB with min assist due to retrolean.  End of Session Patient Position: Up in chair, Alarm on  PT Plan  Inpatient/Swing Bed or Outpatient: Inpatient  PT Plan  Treatment/Interventions: Bed mobility, Transfer training, Stair training, Strengthening, Endurance training  PT Plan: Skilled PT  PT Frequency: 4 times per week  PT Discharge Recommendations: Moderate intensity level of continued care  Equipment Recommended upon Discharge:  (TBD)  PT Recommended Transfer Status: Assist x1, Assist x2 (FWW)  PT - OK to Discharge: Yes (WHEN MEDICALLY CLEARED)      General Visit Information:   PT  Visit  PT Received On: 03/04/24  Response to Previous Treatment: Patient reporting fatigue but able to participate., Other (Comment) (patient just had emesis, nursing aide with patient, patient agreed to get up to get cleaned nd so her bed could be cleaned)  General  Prior to Session Communication: Bedside nurse  Patient Position Received: Bed, 3 rail up  General Comment: patient not feeling well this morning, 02 off when entered ,  Spo2 84 percent reapplied 02 up to 94 percent    Subjective   Precautions:     Vital Signs:       Objective   Pain:  Pain Assessment  Pain Assessment: 0-10  Pain Score: 0 - No pain (no complaints of pain just not feeling well)  Cognition:  Cognition  Overall Cognitive Status: Within Functional Limits  Postural Control:     Extremity/Trunk Assessments:    Activity Tolerance:  Activity Tolerance  Endurance: Tolerates 30 min exercise with multiple rests  Treatments:       Therapeutic Activity  Therapeutic Activity Performed: Yes  Therapeutic Activity 1: patient  washed  faced, brushed teeth and washed hands with set up CGA once in  chair    Bed Mobility  Bed Mobility: Yes  Bed Mobility 1  Bed Mobility 1: Supine to sitting  Level of Assistance 1: Minimum assistance    Ambulation/Gait Training  Ambulation/Gait Training Performed: Yes  Ambulation/Gait Training 1  Comments/Distance (ft) 1: gait training with FWw 15 feet around EOB to chair with mod assist for balance and walker progression       Outcome Measures:  [facility] Basic Mobility  Turning from your back to your side while in a flat bed without using bedrails: A lot  Moving from lying on your back to sitting on the side of a flat bed without using bedrails: A lot  Moving to and from bed to chair (including a wheelchair): A lot  Standing up from a chair using your arms (e.g. wheelchair or bedside chair): A lot  To walk in hospital room: A lot  Climbing 3-5 steps with railing: Total  Basic Mobility - Total Score: 11    Education Documentation  Mobility Training, taught by Nora Valencia PTA at 3/4/2024  8:05 AM.  Learner: Patient  Readiness: Acceptance  Method: Explanation  Response: Verbalizes Understanding    Education Comments  No comments found.        OP EDUCATION:       Encounter Problems       Encounter Problems (Active)       Mobility       STG - Patient will ambulate (Progressing)       Start:  02/27/24    Expected End:  02/29/24       FWW 75 FT CGA         STRENGTHENING (Progressing)       Start:  02/27/24    Expected End:  02/29/24       20 REPS RROM INCREASING STRENGTH TO STABILIZE GAIT            Pain - Adult          Transfers       STG - Patient to transfer to and from sit to supine (Progressing)       Start:  02/27/24    Expected End:  02/29/24       MIN A USING RAILS         STG - Patient will transfer sit to and from stand (Progressing)       Start:  02/27/24    Expected End:  02/29/24       FWW CGA USING PROPER TECHNIQUE

## 2024-03-04 NOTE — PROGRESS NOTES
Per Dr. Head, patient is close to being medically ready, he asked that we have our team start auth, request sent.    [id number]  Received call from Corinne at [facility], she'd like to be updated on when patient will be starting with them. Her number is [phone]. It appears she didn't get the message in the referral portal and would prefer a phone call.

## 2024-03-04 NOTE — CARE PLAN
The patient's goals for the shift include      The clinical goals for the shift include pt will remain free from fall or injury    Over the shift, the patient did not make progress toward the following goals. Barriers to progression include . Recommendations to address these barriers include   Problem: Skin  Goal: Participates in plan/prevention/treatment measures  Outcome: Progressing  Flowsheets (Taken 3/4/2024 2951)  Participates in plan/prevention/treatment measures:   Elevate heels   Increase activity/out of bed for meals  Goal: Promote/optimize nutrition  Outcome: Progressing  Goal: Promote skin healing  Outcome: Progressing     Problem: Fall/Injury  Goal: Not fall by end of shift  Outcome: Progressing  Goal: Be free from injury by end of the shift  Outcome: Progressing  Goal: Verbalize understanding of personal risk factors for fall in the hospital  Outcome: Progressing  Goal: Verbalize understanding of risk factor reduction measures to prevent injury from fall in the home  Outcome: Progressing  Goal: Use assistive devices by end of the shift  Outcome: Progressing  Goal: Pace activities to prevent fatigue by end of the shift  Outcome: Progressing     Problem: Pain  Goal: Takes deep breaths with improved pain control throughout the shift  Outcome: Progressing  Goal: Turns in bed with improved pain control throughout the shift  Outcome: Progressing  Goal: Walks with improved pain control throughout the shift  Outcome: Progressing  Goal: Performs ADL's with improved pain control throughout shift  Outcome: Progressing  Goal: Participates in PT with improved pain control throughout the shift  Outcome: Progressing  Goal: Free from opioid side effects throughout the shift  Outcome: Progressing  Goal: Free from acute confusion related to pain meds throughout the shift  Outcome: Progressing     Problem: Pain - Adult  Goal: Verbalizes/displays adequate comfort level or baseline comfort level  Outcome: Progressing      Problem: Safety - Adult  Goal: Free from fall injury  Outcome: Progressing     Problem: Discharge Planning  Goal: Discharge to home or other facility with appropriate resources  Outcome: Progressing     Problem: Chronic Conditions and Co-morbidities  Goal: Patient's chronic conditions and co-morbidity symptoms are monitored and maintained or improved  Outcome: Progressing     Problem: Diabetes  Goal: Achieve decreasing blood glucose levels by end of shift  Outcome: Progressing  Goal: Increase stability of blood glucose readings by end of shift  Outcome: Progressing  Goal: Decrease in ketones present in urine by end of shift  Outcome: Progressing  Goal: Maintain electrolyte levels within acceptable range throughout shift  Outcome: Progressing  Goal: Maintain glucose levels >70mg/dl to <250mg/dl throughout shift  Outcome: Progressing  Goal: No changes in neurological exam by end of shift  Outcome: Progressing  Goal: Learn about and adhere to nutrition recommendations by end of shift  Outcome: Progressing  Goal: Vital signs within normal range for age by end of shift  Outcome: Progressing  Goal: Increase self care and/or family involovement by end of shift  Outcome: Progressing  Goal: Receive DSME education by end of shift  Outcome: Progressing   .

## 2024-03-04 NOTE — POST-PROCEDURE NOTE
Report to Receiving RN:    Report To: Silvia Edwards  Time Report Called: 1224  Hand-Off Communication: last bp ,reason for no fluid removal pt alert vss  Complications During Treatment: Yes, pt bp dropped uf turned off   Ultrafiltration Treatment: No  Medications Administered During Dialysis: No  Blood Products Administered During Dialysis: No  Labs Sent During Dialysis: No  Heparin Drip Rate Changes: No    Electronic Signatures:    Last Updated: 12:25 PM by VINCENT AVALOS

## 2024-03-04 NOTE — PROGRESS NOTES
"      Nephrology Progress Note      Nephrology following for ESTEE on CKD IV.     Events over night:   TDC bleeding stopped.  Received 1 unit of blood yesterday.  Patient seen while on dialysis this morning.  Says she had one episode of emesis earlier this morning.  Feels fatigued.    /83 (BP Location: Left arm, Patient Position: Lying)   Pulse 102   Temp 36.5 °C (97.7 °F) (Temporal)   Resp 16   Ht 1.549 m (5' 0.98\")   Wt 72 kg (158 lb 11.7 oz)   SpO2 94%   BMI 30.01 kg/m²     Input / Output:  24 HR:   Intake/Output Summary (Last 24 hours) at 3/4/2024 1014  Last data filed at 3/4/2024 1000  Gross per 24 hour   Intake 923.33 ml   Output 150 ml   Net 773.33 ml         Physical Exam   Alert and oriented x 3 NAD  Neck: no JVD  CV: RRR  Lungs: CTA bilaterally  Abd: soft, NT, ND   Ext: minimal lower extremity edema    Scheduled medications  allopurinol, 100 mg, oral, Daily  amLODIPine, 5 mg, oral, Daily  [Held by provider] aspirin, 81 mg, oral, Daily  cholecalciferol, 2,000 Units, oral, Daily  cyanocobalamin, 1,000 mcg, oral, Daily  [Held by provider] heparin (porcine), 5,000 Units, subcutaneous, q8h  heparin, 2,000 Units, intra-catheter, After Dialysis  heparin, 2,000 Units, intra-catheter, After Dialysis  insulin lispro, 0-5 Units, subcutaneous, TID with meals  linezolid, 600 mg, oral, BID  metoprolol tartrate, 25 mg, oral, Daily  pioglitazone, 15 mg, oral, Daily  pravastatin, 40 mg, oral, Nightly  pregabalin, 75 mg, oral, Every other day  sevelamer carbonate, 800 mg, oral, TID with meals  SITagliptin phosphate, 25 mg, oral, Daily      Continuous medications       PRN medications  PRN medications: acetaminophen, acetaminophen, colchicine, dextrose 10 % in water (D10W), dextrose, ferric subsulfate, glucagon, meclizine, ondansetron, oxygen, sennosides-docusate sodium   Results from last 7 days   Lab Units 03/04/24  0412 02/28/24  0548 02/27/24  0318   SODIUM mmol/L 133*   < > 136   POTASSIUM mmol/L 4.5   < > " 5.4*   CHLORIDE mmol/L 98   < > 112*   CO2 mmol/L 27   < > 19*   BUN mg/dL 48*   < > 96*   CREATININE mg/dL 2.59*   < > 4.00*   CALCIUM mg/dL 9.0   < > 8.5*   PROTEIN TOTAL g/dL  --   --  5.2*   BILIRUBIN TOTAL mg/dL  --   --  0.4   ALK PHOS U/L  --   --  38   ALT U/L  --   --  10   AST U/L  --   --  16   GLUCOSE mg/dL 122*   < > 126*    < > = values in this interval not displayed.        Results from last 7 days   Lab Units 03/04/24  0412 03/03/24  0418   MAGNESIUM mg/dL 1.66 1.76        Results from last 7 days   Lab Units 03/04/24  0412 03/03/24  0418 03/02/24  0347   WBC AUTO x10*3/uL 8.8 3.3* 2.6*   HEMOGLOBIN g/dL 9.0* 6.5* 7.5*   HEMATOCRIT % 27.7* 21.2* 22.9*   PLATELETS AUTO x10*3/uL 77* 75* 92*          Assessment & Plan:     Assessment:   Patient is 78 y.o. female who is admitted to hospital for weakness. Nephrology consulted in view of ESTEE on CKD IV.     Progression of CKD stage IV now ESRD  - Patient does have a history of requiring dialysis in the past from Dec 2021 to March 2022  - Patient's creatinine has been worsening over past month, it was 3.07 on 2/18 upon previous discharge. Prior to that her baseline was around 2.6.  - Creatinine upon admission is 4.67,   - Fatigue and weakness may be secondary to uremia, patient experiencing some myoclonic jerks and asterixis as well  - UA with no evidence of infection, 1+ protein which is not new  - No recent hypotension or nephrotoxins  -Initiated dialysis on 2- 29-24 for ongoing uremia    Acute on chronic anemia  - Secondary to CKD and MDS  - Hb  improved  - On high-dose Procrit weekly  - Receives intermittent transfusions as needed     Hyperkalemia  - mild  - no EKG changes     Hyperphosphatemia  - phosphate binder     Metabolic acidosis  -Corrected with dialysis     Volume status  - appears euvolemic  - minimal lower extremity swelling  - no shortness of breath, chest x-ray clear        Recommendations:     -Third session of dialysis this  morning.  - Patient has outpatient chair in [place], however she is going to be going to a SNF in [place] so I have given orders to the unit closer to the SNF  - Discussed pursuing AV fistula as soon as possible    Please message me through EPIC chat with any questions or concerns.     JORDAN Pascal IV  3/4/2024  10:14 AM     [facility]    [address], [address]   [place] [address]  Office: [phone]

## 2024-03-05 VITALS
OXYGEN SATURATION: 97 % | HEIGHT: 61 IN | HEART RATE: 89 BPM | WEIGHT: 147.93 LBS | DIASTOLIC BLOOD PRESSURE: 69 MMHG | TEMPERATURE: 98.9 F | RESPIRATION RATE: 20 BRPM | SYSTOLIC BLOOD PRESSURE: 184 MMHG | BODY MASS INDEX: 27.93 KG/M2

## 2024-03-05 LAB
ANION GAP SERPL CALC-SCNC: 8 MMOL/L (ref 10–20)
BASOPHILS # BLD AUTO: 0.03 X10*3/UL (ref 0–0.1)
BASOPHILS NFR BLD AUTO: 0.7 %
BUN SERPL-MCNC: 33 MG/DL (ref 6–23)
CALCIUM SERPL-MCNC: 8.5 MG/DL (ref 8.6–10.3)
CHLORIDE SERPL-SCNC: 100 MMOL/L (ref 98–107)
CO2 SERPL-SCNC: 31 MMOL/L (ref 21–32)
CREAT SERPL-MCNC: 2.49 MG/DL (ref 0.5–1.05)
CRP SERPL-MCNC: 6.12 MG/DL
EGFRCR SERPLBLD CKD-EPI 2021: 19 ML/MIN/1.73M*2
EOSINOPHIL # BLD AUTO: 0.14 X10*3/UL (ref 0–0.4)
EOSINOPHIL NFR BLD AUTO: 3.1 %
ERYTHROCYTE [DISTWIDTH] IN BLOOD BY AUTOMATED COUNT: 19.7 % (ref 11.5–14.5)
GLUCOSE BLD MANUAL STRIP-MCNC: 124 MG/DL (ref 74–99)
GLUCOSE BLD MANUAL STRIP-MCNC: 149 MG/DL (ref 74–99)
GLUCOSE BLD MANUAL STRIP-MCNC: 93 MG/DL (ref 74–99)
GLUCOSE SERPL-MCNC: 100 MG/DL (ref 74–99)
HCT VFR BLD AUTO: 23.8 % (ref 36–46)
HGB BLD-MCNC: 7.8 G/DL (ref 12–16)
IMM GRANULOCYTES # BLD AUTO: 0.02 X10*3/UL (ref 0–0.5)
IMM GRANULOCYTES NFR BLD AUTO: 0.4 % (ref 0–0.9)
LYMPHOCYTES # BLD AUTO: 0.98 X10*3/UL (ref 0.8–3)
LYMPHOCYTES NFR BLD AUTO: 21.4 %
MAGNESIUM SERPL-MCNC: 1.8 MG/DL (ref 1.6–2.4)
MCH RBC QN AUTO: 30.5 PG (ref 26–34)
MCHC RBC AUTO-ENTMCNC: 32.8 G/DL (ref 32–36)
MCV RBC AUTO: 93 FL (ref 80–100)
MONOCYTES # BLD AUTO: 0.35 X10*3/UL (ref 0.05–0.8)
MONOCYTES NFR BLD AUTO: 7.6 %
NEUTROPHILS # BLD AUTO: 3.06 X10*3/UL (ref 1.6–5.5)
NEUTROPHILS NFR BLD AUTO: 66.8 %
NRBC BLD-RTO: 0 /100 WBCS (ref 0–0)
PLATELET # BLD AUTO: 67 X10*3/UL (ref 150–450)
POTASSIUM SERPL-SCNC: 4.3 MMOL/L (ref 3.5–5.3)
RBC # BLD AUTO: 2.56 X10*6/UL (ref 4–5.2)
SODIUM SERPL-SCNC: 135 MMOL/L (ref 136–145)
WBC # BLD AUTO: 4.6 X10*3/UL (ref 4.4–11.3)

## 2024-03-05 PROCEDURE — 80048 BASIC METABOLIC PNL TOTAL CA: CPT | Performed by: INTERNAL MEDICINE

## 2024-03-05 PROCEDURE — 1090000001 HH PPS REVENUE CREDIT

## 2024-03-05 PROCEDURE — 83735 ASSAY OF MAGNESIUM: CPT | Performed by: INTERNAL MEDICINE

## 2024-03-05 PROCEDURE — 97116 GAIT TRAINING THERAPY: CPT | Mod: GP,CQ

## 2024-03-05 PROCEDURE — 97110 THERAPEUTIC EXERCISES: CPT | Mod: GP,CQ

## 2024-03-05 PROCEDURE — 99239 HOSP IP/OBS DSCHRG MGMT >30: CPT | Performed by: INTERNAL MEDICINE

## 2024-03-05 PROCEDURE — 1090000002 HH PPS REVENUE DEBIT

## 2024-03-05 PROCEDURE — 2500000004 HC RX 250 GENERAL PHARMACY W/ HCPCS (ALT 636 FOR OP/ED): Performed by: INTERNAL MEDICINE

## 2024-03-05 PROCEDURE — 85025 COMPLETE CBC W/AUTO DIFF WBC: CPT | Performed by: INTERNAL MEDICINE

## 2024-03-05 PROCEDURE — 36415 COLL VENOUS BLD VENIPUNCTURE: CPT | Performed by: INTERNAL MEDICINE

## 2024-03-05 PROCEDURE — 86140 C-REACTIVE PROTEIN: CPT | Performed by: INTERNAL MEDICINE

## 2024-03-05 PROCEDURE — 2500000001 HC RX 250 WO HCPCS SELF ADMINISTERED DRUGS (ALT 637 FOR MEDICARE OP): Performed by: INTERNAL MEDICINE

## 2024-03-05 PROCEDURE — 2500000002 HC RX 250 W HCPCS SELF ADMINISTERED DRUGS (ALT 637 FOR MEDICARE OP, ALT 636 FOR OP/ED): Performed by: INTERNAL MEDICINE

## 2024-03-05 PROCEDURE — 82947 ASSAY GLUCOSE BLOOD QUANT: CPT

## 2024-03-05 RX ORDER — ONDANSETRON 4 MG/1
8 TABLET, ORALLY DISINTEGRATING ORAL EVERY 8 HOURS PRN
Qty: 20 TABLET | Refills: 0 | Status: SHIPPED | OUTPATIENT
Start: 2024-03-05 | End: 2024-04-16 | Stop reason: ALTCHOICE

## 2024-03-05 RX ORDER — PREGABALIN 100 MG/1
CAPSULE ORAL
Qty: 6 CAPSULE | Refills: 0 | Status: SHIPPED | OUTPATIENT
Start: 2024-03-05 | End: 2024-04-16 | Stop reason: SDUPTHER

## 2024-03-05 RX ORDER — SEVELAMER CARBONATE 800 MG/1
800 TABLET, FILM COATED ORAL
Qty: 30 TABLET | Refills: 1
Start: 2024-03-05 | End: 2024-05-28 | Stop reason: HOSPADM

## 2024-03-05 RX ORDER — AMOXICILLIN 250 MG
2 CAPSULE ORAL 2 TIMES DAILY PRN
Qty: 30 TABLET | Refills: 0
Start: 2024-03-05 | End: 2024-04-16 | Stop reason: ALTCHOICE

## 2024-03-05 RX ADMIN — THIAMINE HYDROCHLORIDE 200 MG: 100 INJECTION, SOLUTION INTRAMUSCULAR; INTRAVENOUS at 11:00

## 2024-03-05 RX ADMIN — SITAGLIPTIN 25 MG: 50 TABLET, FILM COATED ORAL at 08:41

## 2024-03-05 RX ADMIN — SEVELAMER CARBONATE 800 MG: 800 TABLET, FILM COATED ORAL at 16:40

## 2024-03-05 RX ADMIN — AMLODIPINE BESYLATE 5 MG: 5 TABLET ORAL at 08:40

## 2024-03-05 RX ADMIN — ACETAMINOPHEN 487.5 MG: 325 TABLET ORAL at 13:15

## 2024-03-05 RX ADMIN — SEVELAMER CARBONATE 800 MG: 800 TABLET, FILM COATED ORAL at 12:25

## 2024-03-05 RX ADMIN — SEVELAMER CARBONATE 800 MG: 800 TABLET, FILM COATED ORAL at 08:40

## 2024-03-05 RX ADMIN — PIOGLITAZONE HYDROCHLORIDE 15 MG: 15 TABLET ORAL at 08:42

## 2024-03-05 RX ADMIN — Medication 2000 UNITS: at 08:40

## 2024-03-05 RX ADMIN — CYANOCOBALAMIN TAB 1000 MCG 1000 MCG: 1000 TAB at 08:40

## 2024-03-05 RX ADMIN — METOPROLOL TARTRATE 25 MG: 25 TABLET, FILM COATED ORAL at 08:40

## 2024-03-05 RX ADMIN — ALLOPURINOL 100 MG: 100 TABLET ORAL at 08:40

## 2024-03-05 ASSESSMENT — COGNITIVE AND FUNCTIONAL STATUS - GENERAL
MOVING FROM LYING ON BACK TO SITTING ON SIDE OF FLAT BED WITH BEDRAILS: A LITTLE
STANDING UP FROM CHAIR USING ARMS: A LOT
TOILETING: A LOT
MOVING FROM LYING ON BACK TO SITTING ON SIDE OF FLAT BED WITH BEDRAILS: A LOT
WALKING IN HOSPITAL ROOM: A LOT
MOBILITY SCORE: 13
CLIMB 3 TO 5 STEPS WITH RAILING: TOTAL
WALKING IN HOSPITAL ROOM: A LOT
MOVING TO AND FROM BED TO CHAIR: A LITTLE
MOVING TO AND FROM BED TO CHAIR: A LOT
DRESSING REGULAR UPPER BODY CLOTHING: A LOT
DRESSING REGULAR LOWER BODY CLOTHING: A LOT
HELP NEEDED FOR BATHING: A LOT
STANDING UP FROM CHAIR USING ARMS: A LOT
DAILY ACTIVITIY SCORE: 15
MOBILITY SCORE: 11
CLIMB 3 TO 5 STEPS WITH RAILING: TOTAL
PERSONAL GROOMING: A LITTLE
TURNING FROM BACK TO SIDE WHILE IN FLAT BAD: A LOT
TURNING FROM BACK TO SIDE WHILE IN FLAT BAD: A LOT

## 2024-03-05 ASSESSMENT — PAIN - FUNCTIONAL ASSESSMENT
PAIN_FUNCTIONAL_ASSESSMENT: 0-10
PAIN_FUNCTIONAL_ASSESSMENT: 0-10

## 2024-03-05 ASSESSMENT — PAIN SCALES - GENERAL
PAINLEVEL_OUTOF10: 0 - NO PAIN
PAINLEVEL_OUTOF10: 0 - NO PAIN
PAINLEVEL_OUTOF10: 4

## 2024-03-05 NOTE — PROGRESS NOTES
"      Nephrology Progress Note      Nephrology following for ESTEE on CKD IV.     Events over night:   Patient feeling better than yesterday.  No further episodes of emesis.  She has an appetite and is getting ready to order breakfast.    /73 (BP Location: Left arm, Patient Position: Lying)   Pulse 75   Temp 36.6 °C (97.9 °F) (Temporal)   Resp 17   Ht 1.549 m (5' 0.98\")   Wt 72 kg (158 lb 11.7 oz)   SpO2 100%   BMI 30.01 kg/m²     Input / Output:  24 HR:   Intake/Output Summary (Last 24 hours) at 3/5/2024 0941  Last data filed at 3/5/2024 0500  Gross per 24 hour   Intake 1260 ml   Output 1020 ml   Net 240 ml         Physical Exam   Alert and oriented x 3 NAD  Neck: no JVD  CV: RRR  Lungs: CTA bilaterally  Abd: soft, NT, ND   Ext: minimal lower extremity edema    Scheduled medications  allopurinol, 100 mg, oral, Daily  amLODIPine, 5 mg, oral, Daily  [Held by provider] aspirin, 81 mg, oral, Daily  cholecalciferol, 2,000 Units, oral, Daily  cyanocobalamin, 1,000 mcg, oral, Daily  [Held by provider] heparin (porcine), 5,000 Units, subcutaneous, q8h  heparin, 2,000 Units, intra-catheter, After Dialysis  heparin, 2,000 Units, intra-catheter, After Dialysis  insulin lispro, 0-5 Units, subcutaneous, TID with meals  metoprolol tartrate, 25 mg, oral, Daily  pioglitazone, 15 mg, oral, Daily  pravastatin, 40 mg, oral, Nightly  pregabalin, 75 mg, oral, Every other day  sevelamer carbonate, 800 mg, oral, TID with meals  SITagliptin phosphate, 25 mg, oral, Daily  thiamine, 200 mg, intravenous, Daily      Continuous medications       PRN medications  PRN medications: acetaminophen, acetaminophen, colchicine, dextrose 10 % in water (D10W), dextrose, ferric subsulfate, glucagon, meclizine, ondansetron, oxygen, sennosides-docusate sodium   Results from last 7 days   Lab Units 03/05/24  0639   SODIUM mmol/L 135*   POTASSIUM mmol/L 4.3   CHLORIDE mmol/L 100   CO2 mmol/L 31   BUN mg/dL 33*   CREATININE mg/dL 2.49*   CALCIUM " mg/dL 8.5*   GLUCOSE mg/dL 100*        Results from last 7 days   Lab Units 03/05/24  0639 03/04/24  0412 03/03/24  0418   MAGNESIUM mg/dL 1.80 1.66 1.76        Results from last 7 days   Lab Units 03/04/24  0412 03/03/24  0418 03/02/24  0347   WBC AUTO x10*3/uL 8.8 3.3* 2.6*   HEMOGLOBIN g/dL 9.0* 6.5* 7.5*   HEMATOCRIT % 27.7* 21.2* 22.9*   PLATELETS AUTO x10*3/uL 77* 75* 92*          Assessment & Plan:     Assessment:   Patient is 78 y.o. female who is admitted to hospital for weakness. Nephrology consulted in view of ESTEE on CKD IV.     Progression of CKD stage IV now ESRD  - Patient does have a history of requiring dialysis in the past from Dec 2021 to March 2022  - Patient's creatinine has been worsening over past month, it was 3.07 on 2/18 upon previous discharge. Prior to that her baseline was around 2.6.  - Creatinine upon admission is 4.67,   - Fatigue and weakness may be secondary to uremia, patient experiencing some myoclonic jerks and asterixis as well  - UA with no evidence of infection, 1+ protein which is not new  - No recent hypotension or nephrotoxins  -Initiated dialysis on 2- 29-24 for ongoing uremia    Acute on chronic anemia  - Secondary to CKD and MDS  - Hb  improved  - On high-dose Procrit weekly  - Receives intermittent transfusions as needed     Hyperkalemia  - mild  - no EKG changes     Hyperphosphatemia  - phosphate binder     Metabolic acidosis  -Corrected with dialysis     Volume status  - appears euvolemic  - minimal lower extremity swelling  - no shortness of breath, chest x-ray clear        Recommendations:     -Third session of dialysis yesterday.  - Patient has outpatient chair in [place], however she is going to be going to a SNF in [place] so I have given orders to the unit closer to the SNF  - Discussed pursuing AV fistula as soon as possible    Please message me through EPIC chat with any questions or concerns.     JORDAN Pascal IV  3/5/2024  9:41 AM     [facility]    [address], [address]   [place] [address]  Office: [phone]

## 2024-03-05 NOTE — DISCHARGE SUMMARY
Discharge Diagnosis  Acute kidney injury (nontraumatic) (CMS/HCC)    Persistent bleeding from tunneled dialysis right upper chest  Acute kidney injury on top of chronic kidney disease  Acute anemia blood loss on top of anemia of chronic disease and myelodysplastic syndrome  Chronic diastolic heart failure  Hypertension  COPD without exacerbation  Type 2 diabetes mellitus   deconditioning and weakness  Episode of nausea and vomiting resolved      Issues Requiring Follow-Up    See after visit summary for complete plan will continue with dialysis Monday Wednesday Friday.    Discharge Meds     Your medication list        START taking these medications        Instructions Last Dose Given Next Dose Due   ondansetron ODT 4 mg disintegrating tablet  Commonly known as: Zofran-ODT      Take 2 tablets (8 mg) by mouth every 8 hours if needed for nausea or vomiting.       oxygen gas therapy  Commonly known as: O2      Inhale 3 L/min once every 24 hours.       sennosides-docusate sodium 8.6-50 mg tablet  Commonly known as: Ana-Colace      Take 2 tablets by mouth 2 times a day as needed for constipation.       sevelamer carbonate 800 mg tablet  Commonly known as: Renvela      Take 1 tablet (800 mg) by mouth 3 times a day with meals. Swallow tablet whole; do not crush, break, or chew.              CHANGE how you take these medications        Instructions Last Dose Given Next Dose Due   SITagliptin phosphate 25 mg tablet  Commonly known as: Januvia  Start taking on: March 6, 2024  What changed:   medication strength  how much to take      Take 1 tablet (25 mg) by mouth once daily. Do not start before March 6, 2024.              CONTINUE taking these medications        Instructions Last Dose Given Next Dose Due   acetaminophen 500 mg tablet  Commonly known as: Tylenol           allopurinol 100 mg tablet  Commonly known as: Zyloprim      Take 1 tablet (100 mg) by mouth once daily.       amLODIPine 5 mg tablet  Commonly known as:  Norvasc           cholecalciferol 50 MCG (2000 UT) tablet  Commonly known as: Vitamin D-3           colchicine 0.6 mg tablet           cyanocobalamin 1,000 mcg tablet  Commonly known as: Vitamin B-12           meclizine 12.5 mg tablet  Commonly known as: Antivert      Take 1 tablet (12.5 mg) by mouth 2 times a day as needed for dizziness.       metoprolol tartrate 25 mg tablet  Commonly known as: Lopressor      Take 1 tablet (25 mg) by mouth once daily.       pioglitazone 15 mg tablet  Commonly known as: Actos      TAKE 1 TABLET BY MOUTH ONCE DAILY       pravastatin 40 mg tablet  Commonly known as: Pravachol      TAKE 1 TABLET BY MOUTH ONCE DAILY AT BEDTIME       pregabalin 100 mg capsule  Commonly known as: Lyrica      TAKE ONE CAPSULE BY MOUTH TWICE DAILY @9AM & 5PM       Procrit 10,000 unit/mL injection  Generic drug: epoetin alejandra                  STOP taking these medications      aspirin 81 mg EC tablet        furosemide 20 mg tablet  Commonly known as: Lasix        linezolid 600 mg tablet  Commonly known as: Zyvox                  Where to Get Your Medications        These medications were sent to [id number] - [place] [id number]      Phone: [phone]   ondansetron ODT 4 mg disintegrating tablet  SITagliptin phosphate 25 mg tablet       You can get these medications from any pharmacy    Bring a paper prescription for each of these medications  pregabalin 100 mg capsule       Information about where to get these medications is not yet available    Ask your nurse or doctor about these medications  oxygen gas therapy  sennosides-docusate sodium 8.6-50 mg tablet  sevelamer carbonate 800 mg tablet         Test Results Pending At Discharge  Pending Labs       No current pending labs.            Hospital Course   Tana Hargrove is a 78 y.o. female with PMHx of HTN, HLD, chronic diastolic heart failure, COPD (no baseline O2), CKD, NIDDM-II, MDS with  chronic anemia requiring Procrit and intermittent PRBC transfusions, OA, GERD, who presents to the emergency room with generalized fatigue and weakness.  Patient was recently discharged about 1 week prior to presentation after treatment for urinary tract infection.  She has been feeling much better at home up until the day prior to presentation when she suddenly fell.  She describes it as slumping down as she was attempting to go to the bathroom while using the walker.  This was a second episode on that day.  She denies any chest pain abdominal pain fever chills nausea or vomiting she denies any diarrhea prior to this she has been taking her medications regularly.  She admits to eating well but not drinking enough because she was concerned about overloading herself with fluid as she was told by a primary doctor.        On admission in the emergency room she was hemodynamically stable with a blood pressure 118/54 mmHg, heart rate of 66/min, respiratory rate of 18/min, temperature 36.5 °C, and was saturating 95% on room air.  Initial BMP showed sodium of 134 with a chloride of 108, bicarbonate of 16, BUN/creatinine of 113/4.67.  CBC showed a hemoglobin of 7.1.  His CT of the head was done which showed no acute intracranial abnormality.  Chest x-ray showed no acute cardiopulmonary disease and a right foot x-ray showed no acute osseous abnormality as well with mild multifocal age-related osteoarthritis changes.  Urine analysis showed no signs of UTI.  Patient has been admitted for further evaluation and management.  2/27: Patient was seen and examined.  Still complaining of generalized weakness.  Hemoglobin is 6.9 today after 1 units of packed red blood cell transfusion.  Likely related to hemodilution.  I will transfuse 1 more unit of packed red blood cell today.  Creatinine is improving down to 4.0 today.  Continue IV bicarbonate and nephrologist.  Nephrology is on board and recommendations appreciated.  Seen by PT OT  who recommend extended-care facility however patient is adamantly refusing SNF placement.  Repeat CBC and BMP in AM.  2/28: Patient was seen and examined.  She is clinically better today.  Creatinine is around her baseline at 3.47 today.  Hemoglobin is 8.0 today.  Currently awaiting authorization for placement in an extended care facility.  2/29: Patient was seen and examined.  After extensive conversation with nephrology decision to start patient on hemodialysis was made.  Patient to get a temporary dialysis catheter today and to commence hemodialysis afterwards.  Repeat RFP in AM.  3/1: Patient was seen and examined.  Tunneled dialysis catheter in situ and patient has had 2 sessions of hemodialysis so far.  Was complaining of chest pain at dialysis catheter site.  Catheter site does not look infected.  I will give as needed Tylenol for pain.  Trend RFP daily.  3/2: Patient continued to have significant bleeding overnight was given order for DDAVP today IV since nasal not available here.  Also local Tranexamic acid was given around the catheter and Monsel solution.  Pressure dressing was replaced.  Will continue to monitor.  Continues on Zyvox.  Will take over case today.  Case discussed with nurse at bedside I am familiar with this patient from previous admissions.  3.3: Patient's hemoglobin down to 6.5 today we will give a unit of packed red cells otherwise electrolytes are looking good patient is looking much better no longer bleeding from around tunneled catheter site.  Dressing remains in place.  3/4: Patient had episode of nausea and vomiting this morning will at this time add some IV thiamine Short course.  Continue as needed nausea meds.  Patient with platelets dropped to 77 today.  Has completed 14-day course of Zyvox so will stop Zyvox.  Trying to minimize medication burden.  Dialysis catheter appears to have stopped bleeding.  Increase activities possibly discharge to [facility] care [facility]  tomorrow.  3/5: Patient is sitting up comfortable in chair, currently was on 3L of oxygen via NC. Wean off oxygen this afternoon currently on at 97% on RA. After dialysis yesterday patient is feeling much better, denies SOB, chest pain, abd pain, N/V, and no bleeding from catheter today.  Worked with PT this afternoon, due to increase in activity and doing well off oxygen today able to discharge to [facility] care of [place] today.      See after visit summary for complete plan     35 minutes spent in the care of this patient.          Pertinent Physical Exam At Time of Discharge  Physical Exam  Today's progress note for more physical exam findings  Outpatient Follow-Up  Future Appointments   Date Time Provider Department [place]   3/11/2024  2:00 PM INF 00 [place] [id number] [facility]   3/11/2024  2:30 PM Rosanne M Casal, APRN-CNP, DNP [id number] [facility]   3/18/2024  2:00 PM INF 00 [place] [id number] [facility]   3/25/2024  1:30 PM INF 00 [place] [id number] [facility]   4/1/2024  9:30 AM INF 05 [place] [id number] [facility]   4/8/2024 12:30 PM INF 01 [place] [id number] [facility]   4/16/2024 11:00 AM Carlos Higgins MD [id number] Steve Head MD

## 2024-03-05 NOTE — PROGRESS NOTES
03/05/24 1451   Discharge Planning   Does the patient need discharge transport arranged? Yes   RoundTrip coordination needed? Yes   Has discharge transport been arranged? Yes   What day is the transport expected? 03/05/24   What time is the transport expected? 1700     Spoke with Ina Berry, they are able to start dialysis tomorrow, updated facility that patient was good to go and confirmed that they were scheduling transport to/from dialysis.  Facility responded that a family friend was transporting tomorrow at 9:30 a.m.  When I spoke to Ina Peridot they felt that 9:30 was too early of a  time. I had SNF contact me and they advised that the family friend (the DON) was picking patient up from the hospital at 9:30 tomorrow morning to avoid ambulance bill. I advised facility that patient was discharged today and we were unaware of any prior arrangements for transportation.  I spoke with both of patient's sons, Vadim called me back, I reiterated to him my conversation with the facility and the miscommunication as I was discussing dialysis transport.  Vadim gave permission to have his mother transported by ambulance to the facility today and [facility] has a 12:30  tomorrow to go to [facility].  Bedside RN updated via secure chat with transportation time, nurse to nurse report number, ambulance form on chart.

## 2024-03-05 NOTE — CARE PLAN
Problem: Skin  Goal: Participates in plan/prevention/treatment measures  3/5/2024 0141 by Tori Perez RN  Outcome: Progressing  3/5/2024 0140 by Tori Perez RN  Flowsheets (Taken 3/5/2024 0140)  Participates in plan/prevention/treatment measures:   Elevate heels   Discuss with provider PT/OT consult  Goal: Promote/optimize nutrition  3/5/2024 0141 by Tori Perez RN  Outcome: Progressing  3/5/2024 0140 by Tori Perez RN  Flowsheets (Taken 3/5/2024 0140)  Promote/optimize nutrition:   Offer water/supplements/favorite foods   Assist with feeding  Goal: Promote skin healing  3/5/2024 0141 by Tori Perez RN  Outcome: Progressing  3/5/2024 0140 by Tori Perez RN  Flowsheets (Taken 3/5/2024 0140)  Promote skin healing:   Turn/reposition every 2 hours/use positioning/transfer devices   Assess skin/pad under line(s)/device(s)     Problem: Fall/Injury  Goal: Not fall by end of shift  Outcome: Progressing  Goal: Be free from injury by end of the shift  Outcome: Progressing  Goal: Verbalize understanding of personal risk factors for fall in the hospital  Outcome: Progressing  Goal: Verbalize understanding of risk factor reduction measures to prevent injury from fall in the home  Outcome: Progressing  Goal: Use assistive devices by end of the shift  Outcome: Progressing  Goal: Pace activities to prevent fatigue by end of the shift  Outcome: Progressing     Problem: Pain  Goal: Takes deep breaths with improved pain control throughout the shift  Outcome: Progressing  Goal: Turns in bed with improved pain control throughout the shift  Outcome: Progressing  Goal: Walks with improved pain control throughout the shift  Outcome: Progressing  Goal: Performs ADL's with improved pain control throughout shift  Outcome: Progressing  Goal: Participates in PT with improved pain control throughout the shift  Outcome: Progressing  Goal: Free from opioid side effects throughout the shift  Outcome: Progressing  Goal:  Free from acute confusion related to pain meds throughout the shift  Outcome: Progressing     Problem: Pain - Adult  Goal: Verbalizes/displays adequate comfort level or baseline comfort level  Outcome: Progressing     Problem: Safety - Adult  Goal: Free from fall injury  Outcome: Progressing     Problem: Discharge Planning  Goal: Discharge to home or other facility with appropriate resources  Outcome: Progressing     Problem: Chronic Conditions and Co-morbidities  Goal: Patient's chronic conditions and co-morbidity symptoms are monitored and maintained or improved  Outcome: Progressing     Problem: Diabetes  Goal: Achieve decreasing blood glucose levels by end of shift  Outcome: Progressing  Goal: Increase stability of blood glucose readings by end of shift  Outcome: Progressing  Goal: Decrease in ketones present in urine by end of shift  Outcome: Progressing  Goal: Maintain electrolyte levels within acceptable range throughout shift  Outcome: Progressing  Goal: Maintain glucose levels >70mg/dl to <250mg/dl throughout shift  Outcome: Progressing  Goal: No changes in neurological exam by end of shift  Outcome: Progressing  Goal: Learn about and adhere to nutrition recommendations by end of shift  Outcome: Progressing  Goal: Vital signs within normal range for age by end of shift  Outcome: Progressing  Goal: Increase self care and/or family involovement by end of shift  Outcome: Progressing  Goal: Receive DSME education by end of shift  Outcome: Progressing

## 2024-03-05 NOTE — PROGRESS NOTES
Tana Hargrove is a 78 y.o. female on day 8 of admission presenting with Acute kidney injury (nontraumatic) (CMS/HCC).      Subjective   Tana Hargrove is a 78 y.o. female with PMHx of HTN, HLD, chronic diastolic heart failure, COPD (no baseline O2), CKD, NIDDM-II, MDS with chronic anemia requiring Procrit and intermittent PRBC transfusions, OA, GERD, who presents to the emergency room with generalized fatigue and weakness.  Patient was recently discharged about 1 week prior to presentation after treatment for urinary tract infection.  She has been feeling much better at home up until the day prior to presentation when she suddenly fell.  She describes it as slumping down as she was attempting to go to the bathroom while using the walker.  This was a second episode on that day.  She denies any chest pain abdominal pain fever chills nausea or vomiting she denies any diarrhea prior to this she has been taking her medications regularly.  She admits to eating well but not drinking enough because she was concerned about overloading herself with fluid as she was told by a primary doctor.        On admission in the emergency room she was hemodynamically stable with a blood pressure 118/54 mmHg, heart rate of 66/min, respiratory rate of 18/min, temperature 36.5 °C, and was saturating 95% on room air.  Initial BMP showed sodium of 134 with a chloride of 108, bicarbonate of 16, BUN/creatinine of 113/4.67.  CBC showed a hemoglobin of 7.1.  His CT of the head was done which showed no acute intracranial abnormality.  Chest x-ray showed no acute cardiopulmonary disease and a right foot x-ray showed no acute osseous abnormality as well with mild multifocal age-related osteoarthritis changes.  Urine analysis showed no signs of UTI.  Patient has been admitted for further evaluation and management.  2/27: Patient was seen and examined.  Still complaining of generalized weakness.  Hemoglobin is 6.9 today after 1 units of packed red  blood cell transfusion.  Likely related to hemodilution.  I will transfuse 1 more unit of packed red blood cell today.  Creatinine is improving down to 4.0 today.  Continue IV bicarbonate and nephrologist.  Nephrology is on board and recommendations appreciated.  Seen by PT OT who recommend extended-care facility however patient is adamantly refusing SNF placement.  Repeat CBC and BMP in AM.  2/28: Patient was seen and examined.  She is clinically better today.  Creatinine is around her baseline at 3.47 today.  Hemoglobin is 8.0 today.  Currently awaiting authorization for placement in an extended care facility.  2/29: Patient was seen and examined.  After extensive conversation with nephrology decision to start patient on hemodialysis was made.  Patient to get a temporary dialysis catheter today and to commence hemodialysis afterwards.  Repeat RFP in AM.  3/1: Patient was seen and examined.  Tunneled dialysis catheter in situ and patient has had 2 sessions of hemodialysis so far.  Was complaining of chest pain at dialysis catheter site.  Catheter site does not look infected.  I will give as needed Tylenol for pain.  Trend RFP daily.  3/2: Patient continued to have significant bleeding overnight was given order for DDAVP today IV since nasal not available here.  Also local Tranexamic acid was given around the catheter and Monsel solution.  Pressure dressing was replaced.  Will continue to monitor.  Continues on Zyvox.  Will take over case today.  Case discussed with nurse at bedside I am familiar with this patient from previous admissions.  3.3: Patient's hemoglobin down to 6.5 today we will give a unit of packed red cells otherwise electrolytes are looking good patient is looking much better no longer bleeding from around tunneled catheter site.  Dressing remains in place.  3/4: Patient had episode of nausea and vomiting this morning will at this time add some IV thiamine Short course.  Continue as needed nausea  meds.  Patient with platelets dropped to 77 today.  Has completed 14-day course of Zyvox so will stop Zyvox.  Trying to minimize medication burden.  Dialysis catheter appears to have stopped bleeding.  Increase activities possibly discharge to [facility] tomorrow.  3/5: Patient is sitting up comfortable in chair, currently was on 3L of oxygen via NC. Wean off oxygen this afternoon currently on at 97% on RA. After dialysis yesterday patient is feeling much better, denies SOB, chest pain, abd pain, N/V, and no bleeding from catheter today.  Worked with PT this afternoon, due to increase in activity and doing well off oxygen today able to discharge to [facility] today.     See after visit summary for complete plan    35 minutes spent in the care of this patient.           Objective     Last Recorded Vitals  /62 (BP Location: Left arm, Patient Position: Sitting)   Pulse 66   Temp 36 °C (96.8 °F) (Temporal)   Resp 18   Wt 67.1 kg (147 lb 14.9 oz) Comment: Nursing student reported bed wt to RD  SpO2 96%   Intake/Output last 3 Shifts:    Intake/Output Summary (Last 24 hours) at 3/5/2024 1350  Last data filed at 3/5/2024 0500  Gross per 24 hour   Intake 460 ml   Output 100 ml   Net 360 ml         Admission Weight  Weight: 72 kg (158 lb 11.7 oz) (02/25/24 2204)    Daily Weight  03/05/24 : 67.1 kg (147 lb 14.9 oz)    Image Results  XR chest 1 view  Narrative: Interpreted By:  Pauline Adame,   STUDY:  XR CHEST 1 VIEW;  3/3/2024 9:25 pm      INDICATION:  Signs/Symptoms:Acute hypoxia.      COMPARISON:  02/25/2024      ACCESSION NUMBER(S):  [id number]      ORDERING CLINICIAN:  GRANT CHARLTON      TECHNIQUE:  Portable AP upright      FINDINGS:  Right internal jugular dual lumen catheter tip projects in the right  atrium. Cardiac silhouette appears normal in size. Pulmonary  vasculature is congested and there is mild interstitial edema, new  since the prior study. Elevation of the  right hemidiaphragm is  unchanged. No pleural effusion is noted. Osseous structures show no  acute interval change.      Impression: Pulmonary vascular congestion and interstitial edema      MACRO:  None.      Signed by: Pauline Adame 3/4/2024 8:12 AM  Dictation workstation:   [id number]    EXAM:    Constitutional: A little pale today but patient overall looks good  Head and facial: Slightly pale dialysis catheter right upper chest dressing in place no further bleeding.    Neck: Neck supple    Lungs: Breath sounds bilaterally    Heart: Regular heart rate and frankie    Abdomen: Nontender    Pelvis/: No flank tenderness    Extremities: Some peripheral edema noted stocking-glove neuropathy noted    Neurologic: Stocking glove neuropathy noted no other focal deficits    Dermatologic: Dialysis catheter is in place some dark clot is underneath the adhesive dressing but looks good.    Psychiatric/behavioral: Patient is pleasant appropriate and conversant today.    Assessment/Plan        Scheduled medications  allopurinol, 100 mg, oral, Daily  amLODIPine, 5 mg, oral, Daily  [Held by provider] aspirin, 81 mg, oral, Daily  cholecalciferol, 2,000 Units, oral, Daily  cyanocobalamin, 1,000 mcg, oral, Daily  [Held by provider] heparin (porcine), 5,000 Units, subcutaneous, q8h  heparin, 2,000 Units, intra-catheter, After Dialysis  heparin, 2,000 Units, intra-catheter, After Dialysis  insulin lispro, 0-5 Units, subcutaneous, TID with meals  metoprolol tartrate, 25 mg, oral, Daily  pioglitazone, 15 mg, oral, Daily  pravastatin, 40 mg, oral, Nightly  pregabalin, 75 mg, oral, Every other day  sevelamer carbonate, 800 mg, oral, TID with meals  SITagliptin phosphate, 25 mg, oral, Daily         PRN medications  PRN medications: acetaminophen, acetaminophen, colchicine, dextrose 10 % in water (D10W), dextrose, ferric subsulfate, glucagon, meclizine, ondansetron, oxygen, sennosides-docusate sodium          Principal Problem:    Acute  kidney injury (nontraumatic) (CMS/HCC)    Persistent bleeding from tunneled dialysis right upper chest  Acute kidney injury on top of chronic kidney disease  Acute anemia blood loss on top of anemia of chronic disease and myelodysplastic syndrome  Chronic diastolic heart failure  Hypertension  COPD without exacerbation  Type 2 diabetes mellitus   deconditioning and weakness  Episode of nausea and vomiting today.  DVT prophylaxis-SCDs no anticoagulation      See after visit summary for complete plan  Plan discharge [facility]           Spent 35 minutes in the follow-up management of this patient today    KATHY KWONG   Shared visit with student  Patient seen and examined  Note amended and addended  See my orders for complete plan

## 2024-03-05 NOTE — CARE PLAN
The patient's goals for the shift include      The clinical goals for the shift include pt will remain free from fall or injury throughout shift      Problem: Skin  Goal: Participates in plan/prevention/treatment measures  Outcome: Progressing  Flowsheets (Taken 3/5/2024 1006)  Participates in plan/prevention/treatment measures: Elevate heels  Goal: Promote/optimize nutrition  Outcome: Progressing  Flowsheets (Taken 3/5/2024 1006)  Promote/optimize nutrition: Monitor/record intake including meals  Goal: Promote skin healing  Outcome: Progressing  Flowsheets (Taken 3/5/2024 1006)  Promote skin healing: Turn/reposition every 2 hours/use positioning/transfer devices     Problem: Fall/Injury  Goal: Not fall by end of shift  Outcome: Progressing  Goal: Be free from injury by end of the shift  Outcome: Progressing  Goal: Verbalize understanding of personal risk factors for fall in the hospital  Outcome: Progressing  Goal: Verbalize understanding of risk factor reduction measures to prevent injury from fall in the home  Outcome: Progressing  Goal: Use assistive devices by end of the shift  Outcome: Progressing  Goal: Pace activities to prevent fatigue by end of the shift  Outcome: Progressing     Problem: Pain  Goal: Takes deep breaths with improved pain control throughout the shift  Outcome: Progressing  Goal: Turns in bed with improved pain control throughout the shift  Outcome: Progressing  Goal: Walks with improved pain control throughout the shift  Outcome: Progressing  Goal: Performs ADL's with improved pain control throughout shift  Outcome: Progressing  Goal: Participates in PT with improved pain control throughout the shift  Outcome: Progressing  Goal: Free from opioid side effects throughout the shift  Outcome: Progressing  Goal: Free from acute confusion related to pain meds throughout the shift  Outcome: Progressing     Problem: Pain - Adult  Goal: Verbalizes/displays adequate comfort level or baseline comfort  level  Outcome: Progressing     Problem: Safety - Adult  Goal: Free from fall injury  Outcome: Progressing     Problem: Discharge Planning  Goal: Discharge to home or other facility with appropriate resources  Outcome: Progressing     Problem: Chronic Conditions and Co-morbidities  Goal: Patient's chronic conditions and co-morbidity symptoms are monitored and maintained or improved  Outcome: Progressing     Problem: Diabetes  Goal: Achieve decreasing blood glucose levels by end of shift  Outcome: Progressing  Goal: Increase stability of blood glucose readings by end of shift  Outcome: Progressing  Goal: Decrease in ketones present in urine by end of shift  Outcome: Progressing  Goal: Maintain electrolyte levels within acceptable range throughout shift  Outcome: Progressing  Goal: Maintain glucose levels >70mg/dl to <250mg/dl throughout shift  Outcome: Progressing  Goal: No changes in neurological exam by end of shift  Outcome: Progressing  Goal: Learn about and adhere to nutrition recommendations by end of shift  Outcome: Progressing  Goal: Vital signs within normal range for age by end of shift  Outcome: Progressing  Goal: Increase self care and/or family involovement by end of shift  Outcome: Progressing  Goal: Receive DSME education by end of shift  Outcome: Progressing

## 2024-03-05 NOTE — PROGRESS NOTES
Nutrition Initial Assessment:   Nutrition Assessment    Reason for Assessment: Length of stay    Medical history per chart: HTN, HLD, chronic diastolic heart failure, COPD (no baseline O2), CKD, NIDDM-II, MDS with chronic anemia requiring Procrit and intermittent PRBC transfusions, OA, GERD    Admitted for ESTEE, generalized weakness    3/5: Pt in bed eating with lights off. Per H&P pt ate well PTA. Pt stated appetite has been poor, today is better might be up to fair. Recorded PO intake since admission poor. Pt receiving HD with arrangements for outpatient chair noted. Pt admission Dx, PMH, labs, medications, allergies, diet orders, skin integrity reviewed. RN stated pt does not like ProStat, discussed changing to Mighty Shake Reduced Sugar BID with Dr. Christie and Cristina Dove via Secure Chat. Order modified. RD to follow per POC, protocol.     Recommend: Continue current diet order.     Nutrition History:   Food and Nutrient History  Energy Intake: Good > 75 % (PTA, per H&P)  Vitamin/Herbal Supplement Use: Pt reported taking MVI PTA    Current Diet: Adult diet Cardiac; 70 gm fat; 2 - 3 grams Sodium  Supplement(s): Yes: ProStat once daily  Average meal Intake during admission:  averaged 34%    Average supplement intake during admission:  RN stated pt does not like      Nutrition Related Findings:     GI symptoms: no GI issues at this time.   BM: Last BM Date: 02/27/24  Wound Type:  R plantar foot diabetic ulcer, L plantar foot diabetic ulcer  (nursing/wound notes provide further details)  Edema: Yes: RLE, LLE +1 per RN  Food allergies: NKFA. is allergic to codeine, hydrocodone, hydrocodone-acetaminophen, meperidine (pf), tramadol, piperacillin-tazobactam, adhesive tape-silicones, and meperidine.  Meds/Labs reviewed.  allopurinol, 100 mg, oral, Daily  amLODIPine, 5 mg, oral, Daily  [Held by provider] aspirin, 81 mg, oral, Daily  cholecalciferol, 2,000 Units, oral, Daily  cyanocobalamin, 1,000 mcg, oral,  "Daily  [Held by provider] heparin (porcine), 5,000 Units, subcutaneous, q8h  heparin, 2,000 Units, intra-catheter, After Dialysis  heparin, 2,000 Units, intra-catheter, After Dialysis  insulin lispro, 0-5 Units, subcutaneous, TID with meals  metoprolol tartrate, 25 mg, oral, Daily  pioglitazone, 15 mg, oral, Daily  pravastatin, 40 mg, oral, Nightly  pregabalin, 75 mg, oral, Every other day  sevelamer carbonate, 800 mg, oral, TID with meals  SITagliptin phosphate, 25 mg, oral, Daily        Nutrition Significant Labs:    Results from last 7 days   Lab Units 03/05/24  0639 03/04/24  0412 03/03/24  0418 03/02/24  0347 03/01/24  0436 02/28/24  0548   GLUCOSE mg/dL 100* 122* 97   < > 92 110*   SODIUM mmol/L 135* 133* 137   < > 139 139   POTASSIUM mmol/L 4.3 4.5 4.3   < > 4.7 5.3   CHLORIDE mmol/L 100 98 102   < > 105 113*   CO2 mmol/L 31 27 31   < > 27 20*   BUN mg/dL 33* 48* 40*   < > 45* 92*   CREATININE mg/dL 2.49* 2.59* 2.91*   < > 2.26* 3.47*   EGFR mL/min/1.73m*2 19* 18* 16*   < > 22* 13*   CALCIUM mg/dL 8.5* 9.0 8.6   < > 9.1 9.6   PHOSPHORUS mg/dL  --   --   --   --  3.3 4.4   MAGNESIUM mg/dL 1.80 1.66 1.76  --   --   --     < > = values in this interval not displayed.     Lab Results   Component Value Date    HGBA1C 6.6 (H) 02/26/2024    HGBA1C 5.9 (A) 09/26/2022     Results from last 7 days   Lab Units 03/05/24  1142 03/05/24  0627 03/04/24 2058 03/04/24  1544 03/04/24  0624 03/03/24  2018 03/03/24  1619 03/03/24  1110   POCT GLUCOSE mg/dL 149* 93 132* 136* 113* 129* 114* 117*     Anthropometrics:  Height: 154.9 cm (5' 0.98\")   Weight: 67.1 kg (147 lb 14.9 oz) (Nursing student reported bed wt to RD)   BMI (Calculated): 27.97  IBW/kg (Dietitian Calculated): 47.7 kg     Weight History:   Wt Readings from Last 10 Encounters:   03/05/24 67.1 kg (147 lb 14.9 oz)   02/14/24 75.7 kg (166 lb 14.2 oz)   02/09/24 76.9 kg (169 lb 8.5 oz)   01/08/24 75.3 kg (166 lb 0.1 oz)   10/16/23 75.3 kg (166 lb)   10/12/23 75.3 kg " (166 lb)   10/09/23 75.8 kg (167 lb 1.7 oz)   09/25/23 75.8 kg (167 lb 1.7 oz)   09/13/23 78.9 kg (174 lb)   07/20/23 80.7 kg (178 lb)      Weight Change %:  Weight History / % Weight Change: 11% loss when 75.8kg (10/9/23) and 13% loss when 76.9kg (2/9/24) compared to 67.1kg (3/5/24). Pt stated no significant wt changes over the past 12 months. Nursing student reported 67.1kg as a bed wt to RD.  Significant Weight Loss: Yes  Interpretation of Weight Loss: >10% in 6 months       Nutrition Focused Physical Exam Findings:  defer: pt eating with room lights out    Estimated Needs:   Total Energy Estimated Needs (kCal):  (1605-8054)  Method for Estimating Needs: actual body wt  Total Protein Estimated Needs (g):  (55-80)  Method for Estimating Needs: actual body wt  Total Fluid Estimated Needs (mL):   Method for Estimating Needs: per physician      Nutrition Diagnosis   Nutrition Diagnosis:     Nutrition Diagnosis  Patient has Nutrition Diagnosis: Yes  Diagnosis Status (1): New  Nutrition Diagnosis 1: Unintended weight loss  Related to (1): CKD  As Evidenced by (1): >10% wt loss x 6 months, recorded PO intake averaged <50% since admission     Nutrition Interventions/Recommendations   Nutrition Interventions and Recommendations:        Nutrition Prescription:  Individualized Nutrition Prescription Provided for : diet. Recommend: Continue current diet order. Will send Mighty Shake Reduced Sugar BID.        Nutrition Interventions:   Food and/or Nutrient Delivery Interventions  Interventions: Meals and snacks, Medical food supplement  Meals and Snacks: Fat-modified diet, Mineral-modified diet  Goal: consume >75% of PO  Medical Food Supplement: Commercial beverage  Goal: consume Mighty Shake Reduced Sugar BID    Coordination of Nutrition Care by a Nutrition Professional  Collaboration and Referral of Nutrition Care: Collaboration by nutrition professional with other providers  Goal: Discussed with GE Vega,   Cristina Christie, Wound Care RN    Nutrition Education:   Education Documentation  No documentation found.    Nutrition Counseling  Counseling Theoretical Approach: Other (Comment) (Reviewed diet, supplement)  Goal: Pt verbalized understanding     Nutrition Monitoring and Evaluation   Monitoring/Evaluation:   Food/Nutrient Related History Monitoring  Monitoring and Evaluation Plan: Amount of food  Amount of Food: Estimated amout of food  Criteria: consume >75% of PO    Body Composition/Growth/Weight History  Monitoring and Evaluation Plan: Weight change  Weight Change: Weight change percentage  Criteria: maintain stable wt    Biochemical Data, Medical Tests and Procedures  Monitoring and Evaluation Plan: Electrolyte/renal panel  Electrolyte and Renal Panel: BUN, Creatinine, Magnesium, Phosphorus, Potassium, Sodium  Criteria: WDL    Nutrition Focused Physical Findings  Monitoring and Evaluation Plan: Adipose, Muscles, Skin  Adipose: Other (Comment) (monitor for loss)  Criteria: WDL  Muscles: Other (Comment) (monitor for loss)  Criteria: WDL  Skin: Impaired wound healing  Criteria: promote healing        Time Spent/Follow-up Reminder:   Follow Up  Time Spent (min): 45 minutes  Last Date of Nutrition Visit: 03/05/24  Nutrition Follow-Up Needed?: Dietitian to reassess per policy  Follow up Comment: 3/8-3/10

## 2024-03-05 NOTE — PROGRESS NOTES
Occupational Therapy                 Therapy Communication Note    Patient Name: Tana Hargrove  MRN: [id number]  Today's Date: 3/5/2024     Discipline: Occupational Therapy    Missed Visit Reason: Missed Visit Reason: Patient refused    Missed Time: Attempt    Comment:

## 2024-03-05 NOTE — PROGRESS NOTES
Physical Therapy    Physical Therapy Treatment    Patient Name: Tana Hargrove  MRN: [id number]  Today's Date: 3/5/2024  Time Calculation  Start Time: 1250  Stop Time: 1316  Time Calculation (min): 26 min       Assessment/Plan   PT Assessment  PT Assessment Results: Decreased strength, Decreased range of motion, Decreased endurance, Impaired balance, Decreased mobility  End of Session Communication: Bedside nurse  Assessment Comment: patient looks better today and states she feels better increased gait by 50 feet. still requires verbal cues for safety with transfers  End of Session Patient Position: Up in chair, Alarm on  PT Plan  Inpatient/Swing Bed or Outpatient: Inpatient  PT Plan  Treatment/Interventions: Bed mobility, Transfer training, Gait training, Endurance training  PT Plan: Skilled PT  PT Frequency: 4 times per week  PT Discharge Recommendations: Moderate intensity level of continued care  Equipment Recommended upon Discharge:  (TBD)  PT Recommended Transfer Status: Assist x 1, Assist x 2 (FWW)  PT - OK to Discharge: Yes (WHEN MEDICALLY CLEARED)      General Visit Information:   PT  Visit  PT Received On: 03/05/24  Response to Previous Treatment: Patient reporting fatigue but able to participate.  General  Prior to Session Communication: Bedside nurse, Physician  Patient Position Received: Up in chair, Alarm on  General Comment: patient discharged agreeable to therapy    Subjective   Precautions:     Vital Signs:       Objective   Pain:  Pain Assessment  Pain Assessment: 0-10  Pain Score: 4  Pain Type: Acute pain  Pain Location: Head  Pain Orientation:  (patient complaining of headache starting notified nursing for medication)  Cognition:  Cognition  Orientation Level: Oriented X 4  Postural Control:     Extremity/Trunk Assessments:    Activity Tolerance:  Activity Tolerance  Endurance: Tolerates 30 min exercise with multiple rests  Treatments:  Therapeutic Exercise  Therapeutic Exercise Performed:  Yes  Therapeutic Exercise Activity 1: ankle pumps, marches, knee extension 20 reps each        Ambulation/Gait Training  Ambulation/Gait Training Performed: Yes  Ambulation/Gait Training 1  Comments/Distance (ft) 1: gait training with FWW 70 feet with min assist and verbal cues for maintain posture an keeping feet inside FWW  Transfers  Transfer: Yes  Transfer 1  Transfer From 1: Sit to  Transfer to 1: Stand  Transfer Device 1: Walker  Transfer Level of Assistance 1: Minimum assistance    Outcome Measures:  [facility] Basic Mobility  Turning from your back to your side while in a flat bed without using bedrails: A little  Moving from lying on your back to sitting on the side of a flat bed without using bedrails: A lot  Moving to and from bed to chair (including a wheelchair): A little  Standing up from a chair using your arms (e.g. wheelchair or bedside chair): A lot  To walk in hospital room: A lot  Climbing 3-5 steps with railing: Total  Basic Mobility - Total Score: 13    Education Documentation  Mobility Training, taught by Nora Valencia PTA at 3/5/2024  1:37 PM.  Learner: Patient  Readiness: Acceptance  Method: Explanation  Response: Verbalizes Understanding    Education Comments  No comments found.        OP EDUCATION:       Encounter Problems       Encounter Problems (Active)       Mobility       STG - Patient will ambulate (Progressing)       Start:  02/27/24    Expected End:  03/07/24       FWW 75 FT CGA         STRENGTHENING (Progressing)       Start:  02/27/24    Expected End:  03/07/24       20 REPS RROM INCREASING STRENGTH TO STABILIZE GAIT            Pain - Adult          Transfers       STG - Patient to transfer to and from sit to supine (Progressing)       Start:  02/27/24    Expected End:  03/07/24       MIN A USING RAILS         STG - Patient will transfer sit to and from stand (Progressing)       Start:  02/27/24    Expected End:  03/07/24       FWW CGA USING PROPER TECHNIQUE

## 2024-03-06 ENCOUNTER — APPOINTMENT (OUTPATIENT)
Dept: PRIMARY CARE | Facility: CLINIC | Age: 79
End: 2024-03-06
Payer: MEDICARE

## 2024-03-06 PROCEDURE — 1090000002 HH PPS REVENUE DEBIT

## 2024-03-06 PROCEDURE — 1090000001 HH PPS REVENUE CREDIT

## 2024-03-07 PROCEDURE — 1090000001 HH PPS REVENUE CREDIT

## 2024-03-07 PROCEDURE — 1090000002 HH PPS REVENUE DEBIT

## 2024-03-08 PROCEDURE — 1090000001 HH PPS REVENUE CREDIT

## 2024-03-08 PROCEDURE — 1090000002 HH PPS REVENUE DEBIT

## 2024-03-09 PROCEDURE — 1090000002 HH PPS REVENUE DEBIT

## 2024-03-09 PROCEDURE — 1090000001 HH PPS REVENUE CREDIT

## 2024-03-10 PROCEDURE — 1090000002 HH PPS REVENUE DEBIT

## 2024-03-10 PROCEDURE — 1090000001 HH PPS REVENUE CREDIT

## 2024-03-11 PROCEDURE — 1090000001 HH PPS REVENUE CREDIT

## 2024-03-11 PROCEDURE — 1090000002 HH PPS REVENUE DEBIT

## 2024-03-12 LAB
ATRIAL RATE: 66 BPM
P AXIS: 49 DEGREES
PR INTERVAL: 184 MS
Q ONSET: 249 MS
QRS COUNT: 11 BEATS
QRS DURATION: 88 MS
QT INTERVAL: 417 MS
QTC CALCULATION(BAZETT): 437 MS
QTC FREDERICIA: 430 MS
R AXIS: 13 DEGREES
T AXIS: 45 DEGREES
T OFFSET: 458 MS
VENTRICULAR RATE: 66 BPM

## 2024-03-12 PROCEDURE — 1090000001 HH PPS REVENUE CREDIT

## 2024-03-12 PROCEDURE — 1090000002 HH PPS REVENUE DEBIT

## 2024-03-13 PROCEDURE — 1090000001 HH PPS REVENUE CREDIT

## 2024-03-13 PROCEDURE — 1090000002 HH PPS REVENUE DEBIT

## 2024-03-14 PROCEDURE — 1090000002 HH PPS REVENUE DEBIT

## 2024-03-14 PROCEDURE — 1090000001 HH PPS REVENUE CREDIT

## 2024-03-15 PROCEDURE — 1090000001 HH PPS REVENUE CREDIT

## 2024-03-15 PROCEDURE — 1090000002 HH PPS REVENUE DEBIT

## 2024-03-16 PROCEDURE — 1090000002 HH PPS REVENUE DEBIT

## 2024-03-16 PROCEDURE — 1090000001 HH PPS REVENUE CREDIT

## 2024-03-17 PROCEDURE — 1090000001 HH PPS REVENUE CREDIT

## 2024-03-17 PROCEDURE — 1090000002 HH PPS REVENUE DEBIT

## 2024-03-18 PROCEDURE — 1090000001 HH PPS REVENUE CREDIT

## 2024-03-18 PROCEDURE — 1090000002 HH PPS REVENUE DEBIT

## 2024-03-19 ENCOUNTER — TELEMEDICINE (OUTPATIENT)
Dept: PHARMACY | Facility: HOSPITAL | Age: 79
End: 2024-03-19
Payer: MEDICARE

## 2024-03-19 ENCOUNTER — TELEPHONE (OUTPATIENT)
Dept: PRIMARY CARE | Facility: CLINIC | Age: 79
End: 2024-03-19

## 2024-03-19 DIAGNOSIS — I50.32 CHRONIC DIASTOLIC HEART FAILURE OF UNKNOWN ETIOLOGY (MULTI): ICD-10-CM

## 2024-03-19 DIAGNOSIS — E11.65 TYPE 2 DIABETES MELLITUS WITH HYPERGLYCEMIA, WITHOUT LONG-TERM CURRENT USE OF INSULIN (MULTI): ICD-10-CM

## 2024-03-19 DIAGNOSIS — J44.9 COPD WITHOUT EXACERBATION (MULTI): Chronic | ICD-10-CM

## 2024-03-19 PROCEDURE — 1090000001 HH PPS REVENUE CREDIT

## 2024-03-19 PROCEDURE — 1090000002 HH PPS REVENUE DEBIT

## 2024-03-19 NOTE — TELEPHONE ENCOUNTER
[facility] called to get a verbal order to start home health care for patient as she is discharging from [facility] on Friday. They are also wanting to know if you will follow for home care?     Call back is [phone].

## 2024-03-19 NOTE — PROGRESS NOTES
"Pharmacy Post-Discharge Visit  Tana Hargrove is a 78 y.o. female was referred to Clinical Pharmacy Team to complete a post-discharge medication optimization and monitoring visit.  The patient was referred for their Diabetes, COPD, and Congestive Heart Failure.    Admission Date: 2/25/2024  Discharge Date: 3/5/2024    Referring Provider: Carlos Higgins MD    Subjective   Allergies   Allergen Reactions    Codeine Unknown     \"Feel like I'm floating\"    Could not speak did not feel good    lightheaded    Hydrocodone Other    Hydrocodone-Acetaminophen Unknown     \"I went into shock\" \" States she didn't know where she was after taking 1 dose.    Other reaction(s): Other (See Comments), Other: See Comments, shock   Went into shock   \"I went into shock\" \" States she didn't know where she was after taking 1 dose.    Went into shock    Meperidine (Pf) Unknown    Tramadol Other, Unknown and Itching    Piperacillin-Tazobactam Diarrhea and Headache    Adhesive Tape-Silicones Other    Meperidine Unknown and Nausea And Vomiting     Could talk did not feel well       [id number] - [place], [place] - [address] [facility]  [address] [place] [address]  Phone: [phone] Fax: [phone]    [facility] - [address] [facility] [address]  [address] [facility] [address]  [place] [address]  Phone: [phone] Fax: [phone]    [facility]  [address] [place] [address]  Phone: [phone] Fax: [phone]      Social History     Social History Narrative    Not on file        Notable Medication changes following discharge:  Start:   Ondansetron ODT 4 mg take 2 tablets every 8 hours as needed for nausea or vomiting  Oxygen gas 3 L/min  Sennosides-docusate 8.6-50 mg take 2 tablets twice daily as needed for constipation  Sevelamer 800 mg take 1 tablet 3 times daily with meals    Stop:   Aspirin 81 mg tablet  Furosemide 20 mg tablet  Linezolid 600 mg tablet    Change:   Sitagliptin 25 mg once " daily    Patient endorses feeling better. Reports that her rehab is going well.     Diabetes  She has type 2 diabetes mellitus. Risk factors for coronary artery disease include diabetes mellitus, dyslipidemia and hypertension. She is compliant with treatment all of the time. An ACE inhibitor/angiotensin II receptor blocker is not being taken.     CHF Assessment    Symptom/Staging:  -Most recent ejection fraction: 60% 2/15/2024  -Weight changes?: No    Guideline-Directed Medical Therapy:  -ARNI: No   -If no, then ACEi/ARB?: No  -Beta Blocker: Yes, describe: metoprolol tart. 12.5 mg twice daily  -MRA: No  -SGLT2i: No    Secondary Prevention:  -The ASCVD Risk score (Lakeshia RIVERS, et al., 2019) failed to calculate for the following reasons:    The valid total cholesterol range is 130 to 320 mg/dL   -Aspirin 81mg? no  -Statin?: Yes, describe: pravastatin 40 mg daily  -HTN?: Yes, describe: most recent /69 mmHg    COPD ASSESSMENT  Secondary Prevention (vaccines):  -Influenza: Date [10/12/2023]  -PCV13: Date [9/26/2020]  -PPSV23: Date [11/9/2000; 7/31/2015; 11/9/2020]    Sx Management:  -Increased cough? no  -Increased sputum production? no  -Increased SOB? no    Medication System Management:  Affordability/Accessibility: no reviewed, patient in SNF  Adherence/Organization: not reviewed, patient in SNF  Adverse Effects: denies    Objective     There were no vitals taken for this visit.     LAB  Lab Results   Component Value Date    BILITOT 0.4 02/27/2024    CALCIUM 8.5 (L) 03/05/2024    CO2 31 03/05/2024     03/05/2024    CREATININE 2.49 (H) 03/05/2024    GLUCOSE 100 (H) 03/05/2024    ALKPHOS 38 02/27/2024    K 4.3 03/05/2024    PROT 5.2 (L) 02/27/2024     (L) 03/05/2024    AST 16 02/27/2024    ALT 10 02/27/2024    BUN 33 (H) 03/05/2024    ANIONGAP 8 (L) 03/05/2024    MG 1.80 03/05/2024    PHOS 3.3 03/01/2024     (H) 08/12/2023    ALBUMIN 3.3 (L) 03/01/2024    GFRF 18 (A) 08/17/2023     Lab Results    Component Value Date    TRIG 75 05/19/2023    CHOL 117 05/19/2023    HDL 45.5 05/19/2023     Lab Results   Component Value Date    HGBA1C 6.3 (H) 03/14/2024       Current Outpatient Medications on File Prior to Visit   Medication Sig Dispense Refill    allopurinol (Zyloprim) 100 mg tablet Take 1 tablet (100 mg) by mouth once daily. (Patient taking differently: Take 1 tablet (100 mg) by mouth 2 times a day.) 90 tablet 1    amLODIPine (Norvasc) 5 mg tablet Take 1 tablet (5 mg) by mouth once daily.      cholecalciferol (Vitamin D-3) 50 MCG (2000 UT) tablet Take 1 tablet (2,000 Units) by mouth once daily.      cyanocobalamin (Vitamin B-12) 1,000 mcg tablet Take 1 tablet (1,000 mcg) by mouth once daily.      metoprolol tartrate (Lopressor) 25 mg tablet Take 1 tablet (25 mg) by mouth once daily. 30 tablet 11    ondansetron ODT (Zofran-ODT) 4 mg disintegrating tablet Take 2 tablets (8 mg) by mouth every 8 hours if needed for nausea or vomiting. 20 tablet 0    pravastatin (Pravachol) 40 mg tablet TAKE 1 TABLET BY MOUTH ONCE DAILY AT BEDTIME 90 tablet 0    pregabalin (Lyrica) 100 mg capsule TAKE ONE CAPSULE BY MOUTH TWICE DAILY @9AM & 5PM 6 capsule 0    SITagliptin phosphate (Januvia) 25 mg tablet Take 1 tablet (25 mg) by mouth once daily. Do not start before March 6, 2024. 30 tablet 1    epoetin alejandra (Procrit) 10,000 unit/mL injection Pt gets every Monday.      oxygen (O2) gas therapy Inhale 3 L/min once every 24 hours. (Patient not taking: Reported on 3/19/2024)      pioglitazone (Actos) 15 mg tablet TAKE 1 TABLET BY MOUTH ONCE DAILY (Patient not taking: Reported on 3/19/2024) 90 tablet 0    sennosides-docusate sodium (Ana-Colace) 8.6-50 mg tablet Take 2 tablets by mouth 2 times a day as needed for constipation. (Patient not taking: Reported on 3/19/2024) 30 tablet 0    sevelamer carbonate (Renvela) 800 mg tablet Take 1 tablet (800 mg) by mouth 3 times a day with meals. Swallow tablet whole; do not crush, break, or  chew. (Patient not taking: Reported on 3/19/2024) 30 tablet 1    [DISCONTINUED] acetaminophen (Tylenol) 500 mg tablet Take 1 tablet (500 mg) by mouth every 8 hours if needed.      [DISCONTINUED] colchicine 0.6 mg tablet Take 1 tablet (0.6 mg) by mouth once daily as needed (FOR GOUT FLARES).      [DISCONTINUED] meclizine (Antivert) 12.5 mg tablet Take 1 tablet (12.5 mg) by mouth 2 times a day as needed for dizziness. 15 tablet 0     Current Facility-Administered Medications on File Prior to Visit   Medication Dose Route Frequency Provider Last Rate Last Admin    albuterol 2.5 mg /3 mL (0.083 %) nebulizer solution 3 mL  3 mL nebulization PRN Rosanne M Casal, APRN-CNP, DNP        dextrose 5 % in water (D5W) bolus  500 mL intravenous PRN Rosanne M Casal, APRN-CNP, DNP        diphenhydrAMINE (BENADryl) injection 50 mg  50 mg intravenous PRN Rosanne M Casal, APRN-CNP, DNP        EPINEPHrine (Epipen) injection syringe 0.3 mg  0.3 mg intramuscular q5 min PRN Rosanne M Casal, APRN-CNP, DNP        famotidine PF (Pepcid) injection 20 mg  20 mg intravenous Once PRN Rosanne M Casal, APRN-CNP, DNP        methylPREDNISolone sod suc / (SOLU-Medrol) 40 mg injection 40 mg  40 mg intravenous PRN Rosanne M Casal, APRN-CNP, DNP        sodium chloride 0.9 % bolus 500 mL  500 mL intravenous PRN Rosanne M Casal, APRN-CNP, DNP          DRUG INTERACTIONS  - No significant drug interactions identified at this time    Assessment/Plan   Problem List Items Addressed This Visit       Chronic diastolic heart failure of unknown etiology (CMS/HCC)    COPD without exacerbation (CMS/HCC) (Chronic)    Type 2 diabetes mellitus with hyperglycemia, without long-term current use of insulin (CMS/[facility])   Medication reconciled with patient, though patient was not aware of the new medications prescribed at discharge as medications are being managed by SNF  Will defer further chronic disease state management to SNF and PCP  Follow-up: as needed, no follow-up  scheduled  PCP Follow-Up: 4/16/2024    Continue all meds under the continuation of care with the referring provider and clinical pharmacy team.    Rogerio Ramirez PharmD     Verbal consent to manage patient's drug therapy was obtained from [the patient and/or an individual authorized to act on behalf of a patient]. They were informed they may decline to participate or withdraw from participation in pharmacy services at any time.

## 2024-03-20 PROCEDURE — 1090000002 HH PPS REVENUE DEBIT

## 2024-03-20 PROCEDURE — 1090000001 HH PPS REVENUE CREDIT

## 2024-03-25 ENCOUNTER — PATIENT OUTREACH (OUTPATIENT)
Dept: CARE COORDINATION | Facility: CLINIC | Age: 79
End: 2024-03-25
Payer: MEDICARE

## 2024-03-25 ENCOUNTER — DOCUMENTATION (OUTPATIENT)
Dept: CARE COORDINATION | Facility: CLINIC | Age: 79
End: 2024-03-25
Payer: MEDICARE

## 2024-03-25 ENCOUNTER — INFUSION (OUTPATIENT)
Dept: HEMATOLOGY/ONCOLOGY | Facility: CLINIC | Age: 79
End: 2024-03-25
Payer: MEDICARE

## 2024-03-25 VITALS
DIASTOLIC BLOOD PRESSURE: 63 MMHG | WEIGHT: 147 LBS | TEMPERATURE: 97.7 F | BODY MASS INDEX: 27.75 KG/M2 | HEIGHT: 61 IN | SYSTOLIC BLOOD PRESSURE: 141 MMHG | RESPIRATION RATE: 16 BRPM | HEART RATE: 70 BPM | OXYGEN SATURATION: 97 %

## 2024-03-25 DIAGNOSIS — D46.9 MYELODYSPLASTIC SYNDROME (MULTI): ICD-10-CM

## 2024-03-25 LAB
BASO STIPL BLD QL SMEAR: PRESENT
BASOPHILS # BLD AUTO: 0.04 X10*3/UL (ref 0–0.1)
BASOPHILS NFR BLD AUTO: 0.9 %
EOSINOPHIL # BLD AUTO: 0.14 X10*3/UL (ref 0–0.4)
EOSINOPHIL NFR BLD AUTO: 3.3 %
ERYTHROCYTE [DISTWIDTH] IN BLOOD BY AUTOMATED COUNT: 22.5 % (ref 11.5–14.5)
HCT VFR BLD AUTO: 26.3 % (ref 36–46)
HGB BLD-MCNC: 8.1 G/DL (ref 12–16)
HYPOCHROMIA BLD QL SMEAR: NORMAL
IMM GRANULOCYTES # BLD AUTO: 0 X10*3/UL (ref 0–0.5)
IMM GRANULOCYTES NFR BLD AUTO: 0 % (ref 0–0.9)
LYMPHOCYTES # BLD AUTO: 1.27 X10*3/UL (ref 0.8–3)
LYMPHOCYTES NFR BLD AUTO: 29.7 %
MCH RBC QN AUTO: 31 PG (ref 26–34)
MCHC RBC AUTO-ENTMCNC: 30.8 G/DL (ref 32–36)
MCV RBC AUTO: 101 FL (ref 80–100)
MONOCYTES # BLD AUTO: 0.31 X10*3/UL (ref 0.05–0.8)
MONOCYTES NFR BLD AUTO: 7.2 %
NEUTROPHILS # BLD AUTO: 2.52 X10*3/UL (ref 1.6–5.5)
NEUTROPHILS NFR BLD AUTO: 58.9 %
NRBC BLD-RTO: ABNORMAL /100{WBCS}
PLATELET # BLD AUTO: 210 X10*3/UL (ref 150–450)
RBC # BLD AUTO: 2.61 X10*6/UL (ref 4–5.2)
RBC MORPH BLD: NORMAL
WBC # BLD AUTO: 4.3 X10*3/UL (ref 4.4–11.3)

## 2024-03-25 PROCEDURE — 85025 COMPLETE CBC W/AUTO DIFF WBC: CPT

## 2024-03-25 PROCEDURE — 36415 COLL VENOUS BLD VENIPUNCTURE: CPT

## 2024-03-25 ASSESSMENT — PAIN SCALES - GENERAL: PAINLEVEL: 0-NO PAIN

## 2024-03-25 NOTE — PROGRESS NOTES
Patient here for weekly Retacrit, patient was unable to stay for lab results/ injection because she had to go to dialysis. Patient will return 03/26 at 11:00 for injection. Patient discharged in stable condition.

## 2024-03-25 NOTE — PROGRESS NOTES
Discharge Facility:[facility]  Discharge Diagnosis:Myelodysplastic   Admission Date:03/05/24  Discharge Date: 03/23/24    PCP Appointment Date:none available sent to pcp office  Specialist Appointment Date:   Hospital Encounter and Summary:  not available at this time   See discharge assessment below for further details  Engagement  Call Start Time: 1005 (3/25/2024 10:05 AM)    Medications  Medications reviewed with patient/caregiver?: Yes (3/25/2024 10:05 AM)  Is the patient having any side effects they believe may be caused by any medication additions or changes?: No (3/25/2024 10:05 AM)  Does the patient have all medications ordered at discharge?: Not applicable (3/25/2024 10:05 AM)  Is the patient taking all medications as directed (includes completed medication regime)?: Yes (3/25/2024 10:05 AM)    Appointments  Does the patient have a primary care provider?: Yes (3/25/2024 10:05 AM)  Care Management Interventions: Advised patient to make appointment (3/25/2024 10:05 AM)    Self Management  Has home health visited the patient within 72 hours of discharge?: Yes (3/25/2024 10:05 AM)  Has all Durable Medical Equipment (DME) been delivered?: No (3/25/2024 10:05 AM)    Patient Teaching  Does the patient have access to their discharge instructions?: Yes (3/25/2024 10:05 AM)  Care Management Interventions: Reviewed instructions with patient (improving per her caregiver) (3/25/2024 10:05 AM)  What is the patient's perception of their health status since discharge?: Improving (3/25/2024 10:05 AM)    Wrap Up  Call End Time: 1015 (3/25/2024 10:05 AM)

## 2024-03-26 ENCOUNTER — APPOINTMENT (OUTPATIENT)
Dept: HEMATOLOGY/ONCOLOGY | Facility: CLINIC | Age: 79
End: 2024-03-26
Payer: MEDICARE

## 2024-03-28 NOTE — TELEPHONE ENCOUNTER
Called patient and notified of message. Patient expressed verbal understanding and had no questions at this time.

## 2024-04-01 ENCOUNTER — APPOINTMENT (OUTPATIENT)
Dept: HEMATOLOGY/ONCOLOGY | Facility: CLINIC | Age: 79
End: 2024-04-01
Payer: MEDICARE

## 2024-04-05 ENCOUNTER — PATIENT OUTREACH (OUTPATIENT)
Dept: CARE COORDINATION | Facility: CLINIC | Age: 79
End: 2024-04-05
Payer: MEDICARE

## 2024-04-05 NOTE — PROGRESS NOTES
Call regarding appt. with PCP on  after hospitalization.she has an appt on 04/16/24  At time of outreach call the patient feels as if their condition has(improved since last visit.  Reviewed the PCP appointment with the pt and addressed any questions or concerns.

## 2024-04-08 ENCOUNTER — APPOINTMENT (OUTPATIENT)
Dept: HEMATOLOGY/ONCOLOGY | Facility: CLINIC | Age: 79
End: 2024-04-08
Payer: MEDICARE

## 2024-04-13 ENCOUNTER — APPOINTMENT (OUTPATIENT)
Dept: RADIOLOGY | Facility: HOSPITAL | Age: 79
DRG: 444 | End: 2024-04-13
Payer: MEDICARE

## 2024-04-13 ENCOUNTER — HOSPITAL ENCOUNTER (INPATIENT)
Facility: HOSPITAL | Age: 79
LOS: 2 days | Discharge: HOME | DRG: 444 | End: 2024-04-15
Attending: EMERGENCY MEDICINE | Admitting: INTERNAL MEDICINE
Payer: MEDICARE

## 2024-04-13 ENCOUNTER — APPOINTMENT (OUTPATIENT)
Dept: CARDIOLOGY | Facility: HOSPITAL | Age: 79
DRG: 444 | End: 2024-04-13
Payer: MEDICARE

## 2024-04-13 DIAGNOSIS — E11.22 TYPE 2 DIABETES MELLITUS WITH STAGE 4 CHRONIC KIDNEY DISEASE, WITHOUT LONG-TERM CURRENT USE OF INSULIN (MULTI): Chronic | ICD-10-CM

## 2024-04-13 DIAGNOSIS — I10 HYPERTENSION, ESSENTIAL: Chronic | ICD-10-CM

## 2024-04-13 DIAGNOSIS — J18.9 PNEUMONIA OF RIGHT LUNG DUE TO INFECTIOUS ORGANISM, UNSPECIFIED PART OF LUNG: Primary | ICD-10-CM

## 2024-04-13 DIAGNOSIS — J42 CHRONIC BRONCHITIS, UNSPECIFIED CHRONIC BRONCHITIS TYPE (MULTI): ICD-10-CM

## 2024-04-13 DIAGNOSIS — N18.4 TYPE 2 DIABETES MELLITUS WITH STAGE 4 CHRONIC KIDNEY DISEASE, WITHOUT LONG-TERM CURRENT USE OF INSULIN (MULTI): Chronic | ICD-10-CM

## 2024-04-13 DIAGNOSIS — I50.32 CHRONIC DIASTOLIC HEART FAILURE OF UNKNOWN ETIOLOGY (MULTI): ICD-10-CM

## 2024-04-13 DIAGNOSIS — K81.9 CHOLECYSTITIS: ICD-10-CM

## 2024-04-13 DIAGNOSIS — N18.4 STAGE 4 CHRONIC KIDNEY DISEASE (MULTI): Chronic | ICD-10-CM

## 2024-04-13 DIAGNOSIS — R09.02 HYPOXIA: ICD-10-CM

## 2024-04-13 LAB
ALBUMIN SERPL BCP-MCNC: 4 G/DL (ref 3.4–5)
ALBUMIN SERPL BCP-MCNC: 4 G/DL (ref 3.4–5)
ALP SERPL-CCNC: 70 U/L (ref 33–136)
ALT SERPL W P-5'-P-CCNC: 16 U/L (ref 7–45)
ANION GAP BLDV CALCULATED.4IONS-SCNC: 7 MMOL/L (ref 10–25)
ANION GAP SERPL CALC-SCNC: 10 MMOL/L (ref 10–20)
ANION GAP SERPL CALC-SCNC: 13 MMOL/L (ref 10–20)
AST SERPL W P-5'-P-CCNC: 23 U/L (ref 9–39)
BASE EXCESS BLDV CALC-SCNC: 6.4 MMOL/L (ref -2–3)
BASOPHILS # BLD AUTO: 0.03 X10*3/UL (ref 0–0.1)
BASOPHILS NFR BLD AUTO: 0.4 %
BILIRUB DIRECT SERPL-MCNC: 0.1 MG/DL (ref 0–0.3)
BILIRUB SERPL-MCNC: 0.7 MG/DL (ref 0–1.2)
BNP SERPL-MCNC: 330 PG/ML (ref 0–99)
BODY TEMPERATURE: 37 DEGREES CELSIUS
BUN SERPL-MCNC: 16 MG/DL (ref 6–23)
BUN SERPL-MCNC: 17 MG/DL (ref 6–23)
CA-I BLDV-SCNC: 1.17 MMOL/L (ref 1.1–1.33)
CALCIUM SERPL-MCNC: 8.8 MG/DL (ref 8.6–10.3)
CALCIUM SERPL-MCNC: 9.3 MG/DL (ref 8.6–10.3)
CARDIAC TROPONIN I PNL SERPL HS: 14 NG/L (ref 0–13)
CARDIAC TROPONIN I PNL SERPL HS: 16 NG/L (ref 0–13)
CHLORIDE BLDV-SCNC: 99 MMOL/L (ref 98–107)
CHLORIDE SERPL-SCNC: 97 MMOL/L (ref 98–107)
CHLORIDE SERPL-SCNC: 97 MMOL/L (ref 98–107)
CO2 SERPL-SCNC: 29 MMOL/L (ref 21–32)
CO2 SERPL-SCNC: 31 MMOL/L (ref 21–32)
CREAT SERPL-MCNC: 1.46 MG/DL (ref 0.5–1.05)
CREAT SERPL-MCNC: 1.57 MG/DL (ref 0.5–1.05)
EGFRCR SERPLBLD CKD-EPI 2021: 33 ML/MIN/1.73M*2
EGFRCR SERPLBLD CKD-EPI 2021: 36 ML/MIN/1.73M*2
EOSINOPHIL # BLD AUTO: 0.09 X10*3/UL (ref 0–0.4)
EOSINOPHIL NFR BLD AUTO: 1.2 %
ERYTHROCYTE [DISTWIDTH] IN BLOOD BY AUTOMATED COUNT: 23.4 % (ref 11.5–14.5)
ERYTHROCYTE [DISTWIDTH] IN BLOOD BY AUTOMATED COUNT: 23.7 % (ref 11.5–14.5)
GLUCOSE BLD MANUAL STRIP-MCNC: 104 MG/DL (ref 74–99)
GLUCOSE BLD MANUAL STRIP-MCNC: 133 MG/DL (ref 74–99)
GLUCOSE BLD MANUAL STRIP-MCNC: 147 MG/DL (ref 74–99)
GLUCOSE BLD MANUAL STRIP-MCNC: 168 MG/DL (ref 74–99)
GLUCOSE BLDV-MCNC: 204 MG/DL (ref 74–99)
GLUCOSE SERPL-MCNC: 179 MG/DL (ref 74–99)
GLUCOSE SERPL-MCNC: 200 MG/DL (ref 74–99)
HCO3 BLDV-SCNC: 31.2 MMOL/L (ref 22–26)
HCT VFR BLD AUTO: 22.4 % (ref 36–46)
HCT VFR BLD AUTO: 23.4 % (ref 36–46)
HCT VFR BLD EST: 21 % (ref 36–46)
HGB BLD-MCNC: 7.4 G/DL (ref 12–16)
HGB BLD-MCNC: 7.7 G/DL (ref 12–16)
HGB BLDV-MCNC: 7 G/DL (ref 12–16)
HYPOCHROMIA BLD QL SMEAR: NORMAL
IMM GRANULOCYTES # BLD AUTO: 0.04 X10*3/UL (ref 0–0.5)
IMM GRANULOCYTES NFR BLD AUTO: 0.5 % (ref 0–0.9)
INHALED O2 CONCENTRATION: 21 %
LACTATE BLDV-SCNC: 1.1 MMOL/L (ref 0.4–2)
LACTATE SERPL-SCNC: 1.1 MMOL/L (ref 0.4–2)
LIPASE SERPL-CCNC: 36 U/L (ref 9–82)
LYMPHOCYTES # BLD AUTO: 1.04 X10*3/UL (ref 0.8–3)
LYMPHOCYTES NFR BLD AUTO: 13.7 %
MAGNESIUM SERPL-MCNC: 1.77 MG/DL (ref 1.6–2.4)
MCH RBC QN AUTO: 32.2 PG (ref 26–34)
MCH RBC QN AUTO: 32.4 PG (ref 26–34)
MCHC RBC AUTO-ENTMCNC: 32.9 G/DL (ref 32–36)
MCHC RBC AUTO-ENTMCNC: 33 G/DL (ref 32–36)
MCV RBC AUTO: 97 FL (ref 80–100)
MCV RBC AUTO: 98 FL (ref 80–100)
MONOCYTES # BLD AUTO: 0.62 X10*3/UL (ref 0.05–0.8)
MONOCYTES NFR BLD AUTO: 8.2 %
NEUTROPHILS # BLD AUTO: 5.77 X10*3/UL (ref 1.6–5.5)
NEUTROPHILS NFR BLD AUTO: 76 %
NRBC BLD-RTO: 0 /100 WBCS (ref 0–0)
NRBC BLD-RTO: 0 /100 WBCS (ref 0–0)
OVALOCYTES BLD QL SMEAR: NORMAL
OXYHGB MFR BLDV: 76.2 % (ref 45–75)
PCO2 BLDV: 46 MM HG (ref 41–51)
PH BLDV: 7.44 PH (ref 7.33–7.43)
PHOSPHATE SERPL-MCNC: 3.8 MG/DL (ref 2.5–4.9)
PLATELET # BLD AUTO: 228 X10*3/UL (ref 150–450)
PLATELET # BLD AUTO: 255 X10*3/UL (ref 150–450)
PO2 BLDV: 47 MM HG (ref 35–45)
POLYCHROMASIA BLD QL SMEAR: NORMAL
POTASSIUM BLDV-SCNC: 3.4 MMOL/L (ref 3.5–5.3)
POTASSIUM SERPL-SCNC: 3.5 MMOL/L (ref 3.5–5.3)
POTASSIUM SERPL-SCNC: 3.6 MMOL/L (ref 3.5–5.3)
PROT SERPL-MCNC: 6.4 G/DL (ref 6.4–8.2)
RBC # BLD AUTO: 2.3 X10*6/UL (ref 4–5.2)
RBC # BLD AUTO: 2.38 X10*6/UL (ref 4–5.2)
RBC MORPH BLD: NORMAL
SAO2 % BLDV: 78 % (ref 45–75)
SODIUM BLDV-SCNC: 134 MMOL/L (ref 136–145)
SODIUM SERPL-SCNC: 134 MMOL/L (ref 136–145)
SODIUM SERPL-SCNC: 135 MMOL/L (ref 136–145)
TARGETS BLD QL SMEAR: NORMAL
WBC # BLD AUTO: 10.5 X10*3/UL (ref 4.4–11.3)
WBC # BLD AUTO: 7.6 X10*3/UL (ref 4.4–11.3)

## 2024-04-13 PROCEDURE — 85027 COMPLETE CBC AUTOMATED: CPT | Performed by: INTERNAL MEDICINE

## 2024-04-13 PROCEDURE — 99223 1ST HOSP IP/OBS HIGH 75: CPT | Performed by: INTERNAL MEDICINE

## 2024-04-13 PROCEDURE — 71250 CT THORAX DX C-: CPT | Performed by: RADIOLOGY

## 2024-04-13 PROCEDURE — 83605 ASSAY OF LACTIC ACID: CPT | Performed by: EMERGENCY MEDICINE

## 2024-04-13 PROCEDURE — 96375 TX/PRO/DX INJ NEW DRUG ADDON: CPT

## 2024-04-13 PROCEDURE — 99232 SBSQ HOSP IP/OBS MODERATE 35: CPT | Performed by: INTERNAL MEDICINE

## 2024-04-13 PROCEDURE — 74176 CT ABD & PELVIS W/O CONTRAST: CPT | Performed by: RADIOLOGY

## 2024-04-13 PROCEDURE — 71045 X-RAY EXAM CHEST 1 VIEW: CPT | Performed by: SURGERY

## 2024-04-13 PROCEDURE — 93005 ELECTROCARDIOGRAM TRACING: CPT

## 2024-04-13 PROCEDURE — 84484 ASSAY OF TROPONIN QUANT: CPT | Performed by: EMERGENCY MEDICINE

## 2024-04-13 PROCEDURE — 36415 COLL VENOUS BLD VENIPUNCTURE: CPT | Performed by: EMERGENCY MEDICINE

## 2024-04-13 PROCEDURE — G0378 HOSPITAL OBSERVATION PER HR: HCPCS

## 2024-04-13 PROCEDURE — 94761 N-INVAS EAR/PLS OXIMETRY MLT: CPT

## 2024-04-13 PROCEDURE — 1200000002 HC GENERAL ROOM WITH TELEMETRY DAILY

## 2024-04-13 PROCEDURE — 84132 ASSAY OF SERUM POTASSIUM: CPT | Performed by: INTERNAL MEDICINE

## 2024-04-13 PROCEDURE — 84132 ASSAY OF SERUM POTASSIUM: CPT | Performed by: EMERGENCY MEDICINE

## 2024-04-13 PROCEDURE — 99285 EMERGENCY DEPT VISIT HI MDM: CPT | Mod: 25

## 2024-04-13 PROCEDURE — 71045 X-RAY EXAM CHEST 1 VIEW: CPT

## 2024-04-13 PROCEDURE — 83880 ASSAY OF NATRIURETIC PEPTIDE: CPT | Performed by: EMERGENCY MEDICINE

## 2024-04-13 PROCEDURE — 2500000002 HC RX 250 W HCPCS SELF ADMINISTERED DRUGS (ALT 637 FOR MEDICARE OP, ALT 636 FOR OP/ED): Performed by: INTERNAL MEDICINE

## 2024-04-13 PROCEDURE — 97166 OT EVAL MOD COMPLEX 45 MIN: CPT | Mod: GO | Performed by: OCCUPATIONAL THERAPIST

## 2024-04-13 PROCEDURE — 82947 ASSAY GLUCOSE BLOOD QUANT: CPT

## 2024-04-13 PROCEDURE — 76705 ECHO EXAM OF ABDOMEN: CPT

## 2024-04-13 PROCEDURE — 2500000004 HC RX 250 GENERAL PHARMACY W/ HCPCS (ALT 636 FOR OP/ED): Performed by: EMERGENCY MEDICINE

## 2024-04-13 PROCEDURE — 82248 BILIRUBIN DIRECT: CPT | Performed by: EMERGENCY MEDICINE

## 2024-04-13 PROCEDURE — 2500000004 HC RX 250 GENERAL PHARMACY W/ HCPCS (ALT 636 FOR OP/ED): Performed by: INTERNAL MEDICINE

## 2024-04-13 PROCEDURE — 85025 COMPLETE CBC W/AUTO DIFF WBC: CPT | Performed by: EMERGENCY MEDICINE

## 2024-04-13 PROCEDURE — 99221 1ST HOSP IP/OBS SF/LOW 40: CPT | Performed by: SURGERY

## 2024-04-13 PROCEDURE — 74176 CT ABD & PELVIS W/O CONTRAST: CPT

## 2024-04-13 PROCEDURE — 76705 ECHO EXAM OF ABDOMEN: CPT | Performed by: SURGERY

## 2024-04-13 PROCEDURE — 97162 PT EVAL MOD COMPLEX 30 MIN: CPT | Mod: GP

## 2024-04-13 PROCEDURE — 83735 ASSAY OF MAGNESIUM: CPT | Performed by: INTERNAL MEDICINE

## 2024-04-13 PROCEDURE — 83690 ASSAY OF LIPASE: CPT | Performed by: EMERGENCY MEDICINE

## 2024-04-13 PROCEDURE — 2500000001 HC RX 250 WO HCPCS SELF ADMINISTERED DRUGS (ALT 637 FOR MEDICARE OP): Performed by: INTERNAL MEDICINE

## 2024-04-13 RX ORDER — AMLODIPINE BESYLATE 5 MG/1
5 TABLET ORAL DAILY
Status: DISCONTINUED | OUTPATIENT
Start: 2024-04-13 | End: 2024-04-15 | Stop reason: HOSPADM

## 2024-04-13 RX ORDER — DEXTROSE 50 % IN WATER (D50W) INTRAVENOUS SYRINGE
25
Status: DISCONTINUED | OUTPATIENT
Start: 2024-04-13 | End: 2024-04-15 | Stop reason: HOSPADM

## 2024-04-13 RX ORDER — METOPROLOL TARTRATE 25 MG/1
25 TABLET, FILM COATED ORAL DAILY
Status: DISCONTINUED | OUTPATIENT
Start: 2024-04-13 | End: 2024-04-15 | Stop reason: HOSPADM

## 2024-04-13 RX ORDER — DEXTROSE 50 % IN WATER (D50W) INTRAVENOUS SYRINGE
12.5
Status: DISCONTINUED | OUTPATIENT
Start: 2024-04-13 | End: 2024-04-15 | Stop reason: HOSPADM

## 2024-04-13 RX ORDER — HEPARIN SODIUM 5000 [USP'U]/ML
5000 INJECTION, SOLUTION INTRAVENOUS; SUBCUTANEOUS EVERY 8 HOURS SCHEDULED
Status: DISCONTINUED | OUTPATIENT
Start: 2024-04-13 | End: 2024-04-15 | Stop reason: HOSPADM

## 2024-04-13 RX ORDER — INSULIN LISPRO 100 [IU]/ML
0-10 INJECTION, SOLUTION INTRAVENOUS; SUBCUTANEOUS
Status: DISCONTINUED | OUTPATIENT
Start: 2024-04-13 | End: 2024-04-15 | Stop reason: HOSPADM

## 2024-04-13 RX ORDER — PREGABALIN 50 MG/1
100 CAPSULE ORAL 2 TIMES DAILY
Status: DISCONTINUED | OUTPATIENT
Start: 2024-04-13 | End: 2024-04-15 | Stop reason: HOSPADM

## 2024-04-13 RX ORDER — PRAVASTATIN SODIUM 40 MG/1
40 TABLET ORAL NIGHTLY
Status: DISCONTINUED | OUTPATIENT
Start: 2024-04-13 | End: 2024-04-15 | Stop reason: HOSPADM

## 2024-04-13 RX ADMIN — INSULIN LISPRO 2 UNITS: 100 INJECTION, SOLUTION INTRAVENOUS; SUBCUTANEOUS at 08:02

## 2024-04-13 RX ADMIN — PREGABALIN 100 MG: 50 CAPSULE ORAL at 20:40

## 2024-04-13 RX ADMIN — HYDROMORPHONE HYDROCHLORIDE 0.5 MG: 1 INJECTION, SOLUTION INTRAMUSCULAR; INTRAVENOUS; SUBCUTANEOUS at 01:26

## 2024-04-13 RX ADMIN — METOPROLOL TARTRATE 25 MG: 25 TABLET, FILM COATED ORAL at 08:02

## 2024-04-13 RX ADMIN — PRAVASTATIN SODIUM 40 MG: 40 TABLET ORAL at 20:40

## 2024-04-13 RX ADMIN — PREGABALIN 100 MG: 50 CAPSULE ORAL at 08:02

## 2024-04-13 RX ADMIN — HEPARIN SODIUM 5000 UNITS: 5000 INJECTION INTRAVENOUS; SUBCUTANEOUS at 22:00

## 2024-04-13 RX ADMIN — AMLODIPINE BESYLATE 5 MG: 5 TABLET ORAL at 08:02

## 2024-04-13 RX ADMIN — HEPARIN SODIUM 5000 UNITS: 5000 INJECTION INTRAVENOUS; SUBCUTANEOUS at 13:29

## 2024-04-13 SDOH — SOCIAL STABILITY: SOCIAL INSECURITY: ARE YOU OR HAVE YOU BEEN THREATENED OR ABUSED PHYSICALLY, EMOTIONALLY, OR SEXUALLY BY ANYONE?: NO

## 2024-04-13 SDOH — SOCIAL STABILITY: SOCIAL INSECURITY: ABUSE: ADULT

## 2024-04-13 SDOH — SOCIAL STABILITY: SOCIAL INSECURITY: ARE THERE ANY APPARENT SIGNS OF INJURIES/BEHAVIORS THAT COULD BE RELATED TO ABUSE/NEGLECT?: NO

## 2024-04-13 SDOH — SOCIAL STABILITY: SOCIAL INSECURITY: DOES ANYONE TRY TO KEEP YOU FROM HAVING/CONTACTING OTHER FRIENDS OR DOING THINGS OUTSIDE YOUR HOME?: NO

## 2024-04-13 SDOH — SOCIAL STABILITY: SOCIAL INSECURITY: HAS ANYONE EVER THREATENED TO HURT YOUR FAMILY OR YOUR PETS?: NO

## 2024-04-13 SDOH — SOCIAL STABILITY: SOCIAL INSECURITY: DO YOU FEEL ANYONE HAS EXPLOITED OR TAKEN ADVANTAGE OF YOU FINANCIALLY OR OF YOUR PERSONAL PROPERTY?: NO

## 2024-04-13 SDOH — SOCIAL STABILITY: SOCIAL INSECURITY: HAVE YOU HAD THOUGHTS OF HARMING ANYONE ELSE?: NO

## 2024-04-13 SDOH — SOCIAL STABILITY: SOCIAL INSECURITY: DO YOU FEEL UNSAFE GOING BACK TO THE PLACE WHERE YOU ARE LIVING?: NO

## 2024-04-13 SDOH — SOCIAL STABILITY: SOCIAL INSECURITY: WERE YOU ABLE TO COMPLETE ALL THE BEHAVIORAL HEALTH SCREENINGS?: YES

## 2024-04-13 ASSESSMENT — PAIN - FUNCTIONAL ASSESSMENT
PAIN_FUNCTIONAL_ASSESSMENT: 0-10

## 2024-04-13 ASSESSMENT — COGNITIVE AND FUNCTIONAL STATUS - GENERAL
MOVING FROM LYING ON BACK TO SITTING ON SIDE OF FLAT BED WITH BEDRAILS: A LITTLE
MOBILITY SCORE: 23
DRESSING REGULAR LOWER BODY CLOTHING: A LOT
CLIMB 3 TO 5 STEPS WITH RAILING: A LITTLE
DAILY ACTIVITIY SCORE: 24
TURNING FROM BACK TO SIDE WHILE IN FLAT BAD: A LITTLE
CLIMB 3 TO 5 STEPS WITH RAILING: A LITTLE
PATIENT BASELINE BEDBOUND: NO
HELP NEEDED FOR BATHING: A LOT
CLIMB 3 TO 5 STEPS WITH RAILING: A LITTLE
WALKING IN HOSPITAL ROOM: A LITTLE
MOVING TO AND FROM BED TO CHAIR: A LITTLE
MOBILITY SCORE: 17
CLIMB 3 TO 5 STEPS WITH RAILING: A LITTLE
CLIMB 3 TO 5 STEPS WITH RAILING: A LOT
PERSONAL GROOMING: A LITTLE
DAILY ACTIVITIY SCORE: 24
MOBILITY SCORE: 23
MOBILITY SCORE: 23
TOILETING: A LOT
DAILY ACTIVITIY SCORE: 17
STANDING UP FROM CHAIR USING ARMS: A LITTLE
MOBILITY SCORE: 23

## 2024-04-13 ASSESSMENT — LIFESTYLE VARIABLES
HAVE PEOPLE ANNOYED YOU BY CRITICIZING YOUR DRINKING: NO
TOTAL SCORE: 0
HOW OFTEN DO YOU HAVE A DRINK CONTAINING ALCOHOL: NEVER
AUDIT-C TOTAL SCORE: 0
SKIP TO QUESTIONS 9-10: 1
EVER FELT BAD OR GUILTY ABOUT YOUR DRINKING: NO
HAVE YOU EVER FELT YOU SHOULD CUT DOWN ON YOUR DRINKING: NO
HOW MANY STANDARD DRINKS CONTAINING ALCOHOL DO YOU HAVE ON A TYPICAL DAY: PATIENT DOES NOT DRINK
AUDIT-C TOTAL SCORE: 0
HOW OFTEN DO YOU HAVE 6 OR MORE DRINKS ON ONE OCCASION: NEVER
EVER HAD A DRINK FIRST THING IN THE MORNING TO STEADY YOUR NERVES TO GET RID OF A HANGOVER: NO

## 2024-04-13 ASSESSMENT — PAIN SCALES - GENERAL
PAINLEVEL_OUTOF10: 0 - NO PAIN
PAINLEVEL_OUTOF10: 2

## 2024-04-13 ASSESSMENT — ENCOUNTER SYMPTOMS
FREQUENCY: 0
CHILLS: 0
FLANK PAIN: 0
NUMBNESS: 0
ARTHRALGIAS: 0
BACK PAIN: 0
NAUSEA: 1
SORE THROAT: 0
VOMITING: 0
SINUS PRESSURE: 0
ABDOMINAL DISTENTION: 0
DIAPHORESIS: 0
LIGHT-HEADEDNESS: 0
FEVER: 0
DIZZINESS: 0
CONSTIPATION: 0
SEIZURES: 0
JOINT SWELLING: 0
FACIAL ASYMMETRY: 0
DIFFICULTY URINATING: 0
UNEXPECTED WEIGHT CHANGE: 0
DYSURIA: 0
HEMATURIA: 0
BACK PAIN: 1
SPEECH DIFFICULTY: 0
DIARRHEA: 0
WEAKNESS: 0
MYALGIAS: 0
DECREASED CONCENTRATION: 0
WOUND: 0
CONFUSION: 0
ACTIVITY CHANGE: 1
ABDOMINAL PAIN: 1
APPETITE CHANGE: 1
HEADACHES: 0
CHEST TIGHTNESS: 0
TREMORS: 0
SHORTNESS OF BREATH: 0
BLOOD IN STOOL: 0
WHEEZING: 0
NERVOUS/ANXIOUS: 0
FATIGUE: 0
RHINORRHEA: 0
COUGH: 0
ABDOMINAL PAIN: 0
PALPITATIONS: 0
APPETITE CHANGE: 0
SINUS PAIN: 0
VOMITING: 1

## 2024-04-13 ASSESSMENT — PATIENT HEALTH QUESTIONNAIRE - PHQ9
2. FEELING DOWN, DEPRESSED OR HOPELESS: NOT AT ALL
1. LITTLE INTEREST OR PLEASURE IN DOING THINGS: NOT AT ALL
SUM OF ALL RESPONSES TO PHQ9 QUESTIONS 1 & 2: 0

## 2024-04-13 ASSESSMENT — ACTIVITIES OF DAILY LIVING (ADL)
WALKS IN HOME: INDEPENDENT
HEARING - RIGHT EAR: FUNCTIONAL
TOILETING: INDEPENDENT
PATIENT'S MEMORY ADEQUATE TO SAFELY COMPLETE DAILY ACTIVITIES?: YES
BATHING: INDEPENDENT
ADL_ASSISTANCE: INDEPENDENT
ADL_ASSISTANCE: INDEPENDENT
DRESSING YOURSELF: INDEPENDENT
JUDGMENT_ADEQUATE_SAFELY_COMPLETE_DAILY_ACTIVITIES: YES
GROOMING: INDEPENDENT
HEARING - LEFT EAR: FUNCTIONAL
LACK_OF_TRANSPORTATION: PATIENT DECLINED
ASSISTIVE_DEVICE: WALKER
ADEQUATE_TO_COMPLETE_ADL: YES
FEEDING YOURSELF: INDEPENDENT

## 2024-04-13 ASSESSMENT — COLUMBIA-SUICIDE SEVERITY RATING SCALE - C-SSRS
6. HAVE YOU EVER DONE ANYTHING, STARTED TO DO ANYTHING, OR PREPARED TO DO ANYTHING TO END YOUR LIFE?: NO
2. HAVE YOU ACTUALLY HAD ANY THOUGHTS OF KILLING YOURSELF?: NO
1. IN THE PAST MONTH, HAVE YOU WISHED YOU WERE DEAD OR WISHED YOU COULD GO TO SLEEP AND NOT WAKE UP?: NO

## 2024-04-13 NOTE — PROGRESS NOTES
Physical Therapy    Physical Therapy Evaluation    Patient Name: Tana Hargrove  MRN: [id number]  Today's Date: 4/13/2024   Time Calculation  Start Time: 1047  Stop Time: 1103  Time Calculation (min): 16 min    Assessment/Plan   PT Assessment  PT Assessment Results: Decreased strength, Decreased range of motion, Decreased endurance, Impaired balance, Decreased mobility, Impaired judgement, Decreased safety awareness  Rehab Prognosis: Good  End of Session Communication: Bedside nurse  Assessment Comment: Pt required min A x1 forr transfers and gait with use of fww at eval and mod A x1 for bed mobility. she is currently functioning below baseline and would benefit from PT services to address the identified impairments in order to facilitate safe discharge planning  End of Session Patient Position: Bed, 3 rail up, Alarm on  IP OR SWING BED PT PLAN  Inpatient or Swing Bed: Inpatient  PT Plan  Treatment/Interventions: Bed mobility, Transfer training, Gait training, Balance training, Strengthening, Endurance training, Range of motion, Therapeutic exercise, Therapeutic activity, Home exercise program  PT Plan: Skilled PT  PT Frequency: 3 times per week  PT Discharge Recommendations: Moderate intensity level of continued care  PT Recommended Transfer Status: Assist x1  PT - OK to Discharge: Yes      Subjective   General Visit Information:  General  Reason for Referral: impaired mobility  Referred By: ruben  Past Medical History Relevant to Rehab:  HTN, HLD, CAD, HFpEF, COPD (no baseline O2), NIDDM2, CKD (recently initiated again on dialysis and she does have a history of requiring dialysis, still makes urine), chronic anemia requiring Procrit and intermittent PRBC infusions (likely in setting of myelodysplastic disease and CKD), OA, anxiety, depression and GERD   Family/Caregiver Present: No  Co-Treatment: OT  Co-Treatment Reason: Co eval performed with OT to maximize safety while focusing on discipline specific goals  Prior  to Session Communication: Bedside nurse  Patient Position Received: Bed, 3 rail up, Alarm on  General Comment: Pt seen in room 3303. Admitted for abdominal pain, being treated for cholecystitis. Pt limited by fatigue, denies pain. On 2 L of O2 via NC  Home Living:  Home Living  Type of Home: House  Lives With: Alone  Home Adaptive Equipment: Walker rolling or standard  Home Layout: One level  Home Access: Level entry  Home Living Comments: Denies hx of falls . reports being MI for mobility using rollator  Prior Level of Function:  Prior Function Per Pt/Caregiver Report  Level of [place]: Independent with ADLs and functional transfers  Receives Help From: Family  ADL Assistance: Independent  Homemaking Assistance: Independent  Ambulatory Assistance: Independent  Precautions:  Precautions  Precautions Comment: fall precautions  Vital Signs:       Objective   Pain:  Pain Assessment  Pain Assessment: 0-10  Pain Score: 0 - No pain  Cognition:  Cognition  Overall Cognitive Status: Impaired  Safety/Judgement: Exceptions to WFL  Insight: Moderate  Processing Speed: Delayed    General Assessments:  General Observation  General Observation: Pt fatigued, slow to answer questions and to react to commands. Required repeated cues to perform all tasks safely               Activity Tolerance  Activity Tolerance Comments: Limited by fatigue    Sensation  Light Touch: No apparent deficits    Strength  Strength Comments: BLE AROM WFL, BLE strength grossly 3 to 3+/5  Strength  Strength Comments: BLE AROM WFL, BLE strength grossly 3 to 3+/5           Coordination  Movements are Fluid and Coordinated: Yes    Postural Control  Posture Comment: increased trunk flexion in stance    Static Sitting Balance  Static Sitting-Balance Support: Feet supported  Static Sitting-Level of Assistance: Close supervision  Dynamic Sitting Balance  Dynamic Sitting-Balance Support: Feet supported  Dynamic Sitting-Balance: Trunk control  activities  Dynamic Sitting-Comments: F/F+    Static Standing Balance  Static Standing-Balance Support: Bilateral upper extremity supported  Static Standing-Level of Assistance: Minimum assistance (x2 without AD, min A x1 with AD)  Dynamic Standing Balance  Dynamic Standing-Balance Support: Bilateral upper extremity supported  Dynamic Standing-Balance: Turning  Dynamic Standing-Comments: F- with FWW  Functional Assessments:       Bed Mobility  Bed Mobility: Yes  Bed Mobility 1  Bed Mobility 1: Supine to sitting, Sitting to supine  Level of Assistance 1: Moderate assistance    Transfers  Transfer: Yes  Transfer 1  Transfer From 1: Sit to  Transfer to 1: Stand  Transfer Level of Assistance 1:  (Min A/Mod A x2 for transfers without AD, Min A x1 with FWW)  Trials/Comments 1: Remedios x1 with FWW for SPT transfers on/off commode    Ambulation/Gait Training  Ambulation/Gait Training Performed: Yes  Ambulation/Gait Training 1  Surface 1: Level tile  Device 1: Rolling walker  Assistance 1: Minimum assistance  Quality of Gait 1: Decreased step length (increased trunk flexion, decreased frankie)  Comments/Distance (ft) 1: 20    Stairs  Stairs: No       Extremity/Trunk Assessments:     Outcome Measures:  [facility] Basic Mobility  Turning from your back to your side while in a flat bed without using bedrails: A little  Moving from lying on your back to sitting on the side of a flat bed without using bedrails: A little  Moving to and from bed to chair (including a wheelchair): A little  Standing up from a chair using your arms (e.g. wheelchair or bedside chair): A little  To walk in hospital room: A little  Climbing 3-5 steps with railing: A lot  Basic Mobility - Total Score: 17    Encounter Problems       Encounter Problems (Active)       Mobility       STG - Patient will ambulate up to at least 150' with LRD on even surfaces with SBAx1 and no LOB (Progressing)       Start:  04/13/24    Expected End:  04/27/24               PT  Transfers       STG - Transfer from bed to chair with LRD with SBA x1 (Progressing)       Start:  04/13/24    Expected End:  04/27/24            STG - Patient will perform bed mobility with SBA x1 (Progressing)       Start:  04/13/24    Expected End:  04/27/24               Pain - Adult              Education Documentation  Mobility Training, taught by Fallon Boothe, PT at 4/13/2024 12:25 PM.  Learner: Patient  Readiness: Acceptance  Method: Explanation, Demonstration  Response: Needs Reinforcement  Comment: education provided on safe positioning and use of AAD during transfers and gait in room to minimize fall risk    Education Comments  No comments found.

## 2024-04-13 NOTE — PROGRESS NOTES
Tana Hargrove is a 79 y.o. female admitted for Cholecystitis. Pharmacy reviewed the patient's nsgts-go-runpagnva medications and allergies for accuracy.    The list below reflects the PTA list prior to pharmacy medication history. A summary a changes to the PTA medication list has been listed below. Please review each medication in order reconciliation for additional clarification and justification.    Source of information:    Medications added:    Medications modified:    Medications to be removed:   PIOGLITAZONE 15 MG  SENNA DOC 8.6-50 MG    Medications of concern:      Prior to Admission Medications   Prescriptions Last Dose Informant Patient Reported? Taking?   SITagliptin phosphate (Januvia) 25 mg tablet 4/12/2024  No Yes   Sig: Take 1 tablet (25 mg) by mouth once daily. Do not start before March 6, 2024.   allopurinol (Zyloprim) 100 mg tablet 4/12/2024  No Yes   Sig: Take 1 tablet (100 mg) by mouth once daily.   Patient taking differently: Take 1 tablet (100 mg) by mouth 2 times a day.   amLODIPine (Norvasc) 5 mg tablet 4/12/2024  Yes Yes   Sig: Take 1 tablet (5 mg) by mouth once daily.   cholecalciferol (Vitamin D-3) 50 MCG (2000 UT) tablet 4/12/2024  Yes Yes   Sig: Take 1 tablet (2,000 Units) by mouth once daily.   cyanocobalamin (Vitamin B-12) 1,000 mcg tablet 4/12/2024  Yes Yes   Sig: Take 1 tablet (1,000 mcg) by mouth once daily.   epoetin alejandra (Procrit) 10,000 unit/mL injection Past Month  Yes Yes   Sig: Pt gets every Monday.   metoprolol tartrate (Lopressor) 25 mg tablet 4/12/2024  No Yes   Sig: Take 1 tablet (25 mg) by mouth once daily.   ondansetron ODT (Zofran-ODT) 4 mg disintegrating tablet Unknown  No No   Sig: Take 2 tablets (8 mg) by mouth every 8 hours if needed for nausea or vomiting.   oxygen (O2) gas therapy Unknown  No No   Sig: Inhale 3 L/min once every 24 hours.   pioglitazone (Actos) 15 mg tablet   No No   Sig: TAKE 1 TABLET BY MOUTH ONCE DAILY   pravastatin (Pravachol) 40 mg tablet  4/12/2024  No Yes   Sig: TAKE 1 TABLET BY MOUTH ONCE DAILY AT BEDTIME   pregabalin (Lyrica) 100 mg capsule 4/12/2024  No Yes   Sig: TAKE ONE CAPSULE BY MOUTH TWICE DAILY @9AM & 5PM   sennosides-docusate sodium (Ana-Colace) 8.6-50 mg tablet   No No   Sig: Take 2 tablets by mouth 2 times a day as needed for constipation.   sevelamer carbonate (Renvela) 800 mg tablet 4/12/2024  No Yes   Sig: Take 1 tablet (800 mg) by mouth 3 times a day with meals. Swallow tablet whole; do not crush, break, or chew.      Facility-Administered Medications: None       Vivian Ailing

## 2024-04-13 NOTE — CONSULTS
Nephrology Consult Note                                                                                                                                         Inpatient consult to Nephrology  Consult performed by: Pinky Christie DO  Consult ordered by: Ashwin Cruz DO                                                                                                               HPI  Patient is a 79 y.o. female with ESRD, DM 2, HFpEF who is admitted to hospital with complaints of   abdominal pain. Nephrology consulted in view of ESRD.   Patient dialyzed yesterday per her normal schedule, on Monday Wednesday Friday at [facility].  Patient has history of mild dysplastic syndrome with anemia.  She is well-known to me.  Recently had to restart dialysis couple months ago due to uremia.  She presents with abdominal pain concern for gallbladder pain.  Of note, she was supposed to go to [place] today.  The patient is tired but denies significant abdominal pain at this time.  Surgery has seen her and there is no plans for surgical intervention.  She denies shortness of breath.   Appetite is down.  She does have a diet order    Past Medical History:   Diagnosis Date    Abnormal levels of other serum enzymes     Elevated liver enzymes    Acute kidney failure (CMS-HCC)     Anemia     CKD (chronic kidney disease)     COPD (chronic obstructive pulmonary disease) (Multi)     Disease of blood and blood-forming organs, unspecified     Bone marrow disorder    HLD (hyperlipidemia)     MDS (myelodysplastic syndrome) (Multi)     Personal history of other diseases of the musculoskeletal system and connective tissue     History of muscle pain    Personal history of other specified conditions     History of insomnia    Personal history of other specified conditions     History of  edema    Type 2 diabetes mellitus (Multi)       Social History     Socioeconomic History    Marital status:      Spouse name: Not on file    Number of children: Not on file    Years of education: Not on file    Highest education level: Not on file   Occupational History    Not on file   Tobacco Use    Smoking status: Never    Smokeless tobacco: Never   Vaping Use    Vaping status: Never Used   Substance and Sexual Activity    Alcohol use: Not Currently    Drug use: Never    Sexual activity: Not on file   Other Topics Concern    Not on file   Social History Narrative    Not on file     Social Determinants of Health     Financial Resource Strain: Patient Declined (4/13/2024)    Overall Financial Resource Strain (CARDIA)     Difficulty of Paying Living Expenses: Patient declined   Food Insecurity: No Food Insecurity (10/10/2023)    Hunger Vital Sign     Worried About Running Out of Food in the Last Year: Never true     Ran Out of Food in the Last Year: Never true   Transportation Needs: Patient Declined (4/13/2024)    PRAPARE - Transportation     Lack of Transportation (Medical): Patient declined     Lack of Transportation (Non-Medical): Patient declined   Physical Activity: Not on file   Stress: Not on file   Social Connections: Feeling Somewhat Isolated (2/20/2024)    OASIS : Social Isolation     Frequency of experiencing loneliness or isolation: Sometimes   Intimate Partner Violence: Not on file   Housing Stability: Patient Declined (4/13/2024)    Housing Stability Vital Sign     Unable to Pay for Housing in the Last Year: Patient declined     Number of Places Lived in the Last Year: 1     Unstable Housing in the Last Year: Patient declined      No family history on file.   Current Facility-Administered Medications on File Prior to Encounter   Medication Dose Route Frequency Provider Last Rate Last Admin    albuterol 2.5 mg /3 mL (0.083 %) nebulizer solution 3 mL  3 mL nebulization PRN Rosanne M Casal,  APRN-CNP, DNP        dextrose 5 % in water (D5W) bolus  500 mL intravenous PRN Rosanne M Casal, APRN-CNP, DNP        diphenhydrAMINE (BENADryl) injection 50 mg  50 mg intravenous PRN Rosanne M Casal, APRN-CNP, DNP        EPINEPHrine (Epipen) injection syringe 0.3 mg  0.3 mg intramuscular q5 min PRN Rosanne M Casal, APRN-CNP, DNP        famotidine PF (Pepcid) injection 20 mg  20 mg intravenous Once PRN Rosanne M Casal, APRN-CNP, DNP        methylPREDNISolone sod suc / (SOLU-Medrol) 40 mg injection 40 mg  40 mg intravenous PRN Rosanne M Casal, APRN-CNP, DNP        sodium chloride 0.9 % bolus 500 mL  500 mL intravenous PRN Rosanne M Casal, APRN-CNP, DNP         Current Outpatient Medications on File Prior to Encounter   Medication Sig Dispense Refill    allopurinol (Zyloprim) 100 mg tablet Take 1 tablet (100 mg) by mouth once daily. (Patient taking differently: Take 1 tablet (100 mg) by mouth 2 times a day.) 90 tablet 1    amLODIPine (Norvasc) 5 mg tablet Take 1 tablet (5 mg) by mouth once daily.      cholecalciferol (Vitamin D-3) 50 MCG (2000 UT) tablet Take 1 tablet (2,000 Units) by mouth once daily.      cyanocobalamin (Vitamin B-12) 1,000 mcg tablet Take 1 tablet (1,000 mcg) by mouth once daily.      epoetin alejandra (Procrit) 10,000 unit/mL injection Pt gets every Monday.      metoprolol tartrate (Lopressor) 25 mg tablet Take 1 tablet (25 mg) by mouth once daily. 30 tablet 11    pravastatin (Pravachol) 40 mg tablet TAKE 1 TABLET BY MOUTH ONCE DAILY AT BEDTIME 90 tablet 0    pregabalin (Lyrica) 100 mg capsule TAKE ONE CAPSULE BY MOUTH TWICE DAILY @9AM & 5PM 6 capsule 0    sevelamer carbonate (Renvela) 800 mg tablet Take 1 tablet (800 mg) by mouth 3 times a day with meals. Swallow tablet whole; do not crush, break, or chew. 30 tablet 1    SITagliptin phosphate (Januvia) 25 mg tablet Take 1 tablet (25 mg) by mouth once daily. Do not start before March 6, 2024. 30 tablet 1    ondansetron ODT (Zofran-ODT) 4 mg  "disintegrating tablet Take 2 tablets (8 mg) by mouth every 8 hours if needed for nausea or vomiting. 20 tablet 0    oxygen (O2) gas therapy Inhale 3 L/min once every 24 hours.      pioglitazone (Actos) 15 mg tablet TAKE 1 TABLET BY MOUTH ONCE DAILY 90 tablet 0    sennosides-docusate sodium (Ana-Colace) 8.6-50 mg tablet Take 2 tablets by mouth 2 times a day as needed for constipation. 30 tablet 0      Scheduled medications  amLODIPine, 5 mg, oral, Daily  heparin (porcine), 5,000 Units, subcutaneous, q8h JOSE DE JESUS  insulin lispro, 0-10 Units, subcutaneous, With meals & nightly  metoprolol tartrate, 25 mg, oral, Daily  pravastatin, 40 mg, oral, Nightly  pregabalin, 100 mg, oral, BID      Continuous medications     PRN medications  PRN medications: dextrose, dextrose, glucagon, glucagon, HYDROmorphone, HYDROmorphone     Review of systems  as per HPI otherwise 10 point review systems negative    /65 (BP Location: Left arm, Patient Position: Lying)   Pulse 72   Temp 37.4 °C (99.3 °F) (Temporal)   Resp 18   Ht 1.549 m (5' 1\")   Wt 67.7 kg (149 lb 4 oz)   SpO2 97%   BMI 28.20 kg/m²     Input / Output:  24 HR:   Intake/Output Summary (Last 24 hours) at 4/13/2024 1128  Last data filed at 4/13/2024 0923  Gross per 24 hour   Intake 150 ml   Output --   Net 150 ml       Physical Exam   Alert and oriented x 3, NAD  EOMI  OP clear  Neck: supple, No JVD TDC  CV: RRR without m/r/g  Lungs: CTA bilaterally  Abd: soft NT/ND +BS  Ext: No lower extremity edema   Neuro: grossly intact  Skin: no rashes    Results from last 7 days   Lab Units 04/13/24  0553 04/13/24  0133   SODIUM mmol/L 135* 134*   POTASSIUM mmol/L 3.6 3.5   CHLORIDE mmol/L 97* 97*   CO2 mmol/L 29 31   BUN mg/dL 17 16   CREATININE mg/dL 1.57* 1.46*   GLUCOSE mg/dL 179* 200*   CALCIUM mg/dL 9.3 8.8        Results from last 7 days   Lab Units 04/13/24  0553 04/13/24  0133   SODIUM mmol/L 135* 134*   POTASSIUM mmol/L 3.6 3.5   CHLORIDE mmol/L 97* 97*   CO2 mmol/L 29 " 31   BUN mg/dL 17 16   CREATININE mg/dL 1.57* 1.46*   CALCIUM mg/dL 9.3 8.8   PROTEIN TOTAL g/dL  --  6.4   BILIRUBIN TOTAL mg/dL  --  0.7   ALK PHOS U/L  --  70   ALT U/L  --  16   AST U/L  --  23   GLUCOSE mg/dL 179* 200*      Results from last 7 days   Lab Units 04/13/24  0553   MAGNESIUM mg/dL 1.77      Results from last 7 days   Lab Units 04/13/24  0553 04/13/24  0133   WBC AUTO x10*3/uL 10.5 7.6   HEMOGLOBIN g/dL 7.7* 7.4*   HEMATOCRIT % 23.4* 22.4*   PLATELETS AUTO x10*3/uL 255 228        US gallbladder   Final Result   Cholelithiasis and nonspecific gallbladder wall thickening, with   gallbladder nondistended. No pericholecystic fluid or sonographic   Luna's sign. Gallbladder wall thickening is favored to be secondary   to hepatocellular disease.        Renal cortical atrophy and hyperechogenicity suggesting medical renal   disease. No hydronephrosis. Small cortical cysts.             MACRO:   None             Signed by: Yahir Corrales 4/13/2024 4:05 AM   Dictation workstation:   [id number]      CT chest abdomen pelvis wo IV contrast   Final Result   1. Cholelithiasis and mild gallbladder wall thickening. Sonographic   correlation advised.   2. Small patchy opacity in the dependent portion of the right upper   lobe which may represent atelectasis or infiltrate.   3. Additional findings and discussion as above.        MACRO:   None.        Signed by: Pankaj Flynn 4/13/2024 3:06 AM   Dictation workstation:   [id number]      XR chest 1 view   Final Result   1.  No evidence of acute cardiopulmonary process.                  MACRO:   None        Signed by: Yahir Corrales 4/13/2024 2:16 AM   Dictation workstation:   [id number]           Assessment:   Patient is 79 y.o. female with diabetes, hypertension who is admitted to hospital for abdominal pain. Nephrology consulted in view of ESRD.      ESRD  -Hemodialysis Monday Wednesday Friday at [facility]  -Access is [facility]  Had dialysis  yesterday    Volume  Stable    Abdominal pain  -Has cholelithiasis concern for cholecystitis, no surgical intervention planned.    Anemia of ESRD and myelodysplastic syndrome  -Continue to titrate up MARILYNN with outpatient dialysis    Recommendations:   -No need for dialysis today  -May need to back down on her Lyrica dosing as it seems high for ESRD  -Will keep on Monday Wednesday Friday schedule while here.      Please message me through Bird[employer] chat with any questions or concerns.     Pinky Christie DO  4/13/2024  11:28 AM         [facility]    [address] [place] [address], [address]   [place] [address]  Office: [phone]

## 2024-04-13 NOTE — CARE PLAN
Problem: Pain - Adult  Goal: Verbalizes/displays adequate comfort level or baseline comfort level  Outcome: Progressing     Problem: Safety - Adult  Goal: Free from fall injury  Outcome: Progressing     Problem: Discharge Planning  Goal: Discharge to home or other facility with appropriate resources  Outcome: Progressing     Problem: Chronic Conditions and Co-morbidities  Goal: Patient's chronic conditions and co-morbidity symptoms are monitored and maintained or improved  Outcome: Progressing     Problem: Diabetes  Goal: Achieve decreasing blood glucose levels by end of shift  Outcome: Progressing  Goal: Increase stability of blood glucose readings by end of shift  Outcome: Progressing  Goal: Decrease in ketones present in urine by end of shift  Outcome: Progressing  Goal: Maintain electrolyte levels within acceptable range throughout shift  Outcome: Progressing  Goal: Maintain glucose levels >70mg/dl to <250mg/dl throughout shift  Outcome: Progressing  Goal: No changes in neurological exam by end of shift  Outcome: Progressing  Goal: Learn about and adhere to nutrition recommendations by end of shift  Outcome: Progressing  Goal: Vital signs within normal range for age by end of shift  Outcome: Progressing  Goal: Increase self care and/or family involovement by end of shift  Outcome: Progressing  Goal: Receive DSME education by end of shift  Outcome: Progressing     Problem: Skin  Goal: Decreased wound size/increased tissue granulation at next dressing change  Outcome: Progressing  Goal: Participates in plan/prevention/treatment measures  Outcome: Progressing  Flowsheets (Taken 4/13/2024 1977)  Participates in plan/prevention/treatment measures: Elevate heels  Goal: Prevent/manage excess moisture  Outcome: Progressing  Goal: Prevent/minimize sheer/friction injuries  Outcome: Progressing  Goal: Promote/optimize nutrition  Outcome: Progressing  Goal: Promote skin healing  Outcome: Progressing

## 2024-04-13 NOTE — CONSULTS
GENERAL SURGERY CONSULTATION NOTE    Tana Hargrove   1945   [id number]     Inpatient consult to Acute Care Surgery  Consult performed by: Mikel Burgess DO  Consult ordered by: Ashwin Cruz DO          Reason For Consult  Abdominal pain    History Of Present Illness  Tana Hargrove is a 79 y.o. female with history of  HTN, HLD, CAD, HFpEF, COPD (no baseline O2), NIDDM2, CKD , chronic anemia requiring Procrit and intermittent PRBC infusions (likely in setting of myelodysplastic disease and CKD), OA, anxiety, depression and GERD.  presenting with sudden onset right mid back pain. She states she had a hamburger at 4 pm then woke up around 10 pm with sudden intense right back pain below her shoulder. She reports having some nausea at the time too but no emesis. The nausea resolved quickly but the pain persisted. She denies any prior history of gallbladder disease, no fevers, chills, diarrhea, shortness of breath, or changes in appetite. No other acute concerns     Past Medical History  Past Medical History:   Diagnosis Date    Abnormal levels of other serum enzymes     Elevated liver enzymes    Acute kidney failure (CMS-HCC)     Anemia     CKD (chronic kidney disease)     COPD (chronic obstructive pulmonary disease) (Multi)     Disease of blood and blood-forming organs, unspecified     Bone marrow disorder    HLD (hyperlipidemia)     MDS (myelodysplastic syndrome) (Multi)     Personal history of other diseases of the musculoskeletal system and connective tissue     History of muscle pain    Personal history of other specified conditions     History of insomnia    Personal history of other specified conditions     History of edema    Type 2 diabetes mellitus (Multi)        Surgical History  Past Surgical History:   Procedure Laterality Date    APPENDECTOMY      BREAST BIOPSY Left     left excisional    BREAST LUMPECTOMY      COLONOSCOPY      HYSTERECTOMY      MR HEAD ANGIO WO IV CONTRAST  11/20/2020    MR HEAD  ANGIO WO IV CONTRAST 11/20/2020 [facility]    MR NECK ANGIO WO IV CONTRAST  11/20/2020    MR NECK ANGIO WO IV CONTRAST 11/20/2020 [facility] CLINICAL LEGAC[facility]    OOPHORECTOMY      OTHER SURGICAL HISTORY  12/13/2019    Oophorectomy bilateral    OTHER SURGICAL HISTORY  12/13/2019    Tubal ligation    OTHER SURGICAL HISTORY Bilateral 12/13/2019    Knee replacement    OTHER SURGICAL HISTORY Left 12/13/2019    Shoulder surgery    OTHER SURGICAL HISTORY  12/13/2019    Hysterectomy    OTHER SURGICAL HISTORY  12/13/2019    Lumpectomy    OTHER SURGICAL HISTORY Right 12/13/2019    Foot surgery    OTHER SURGICAL HISTORY  04/16/2021    Back surgery       Medications  Current Facility-Administered Medications on File Prior to Encounter   Medication Dose Route Frequency Provider Last Rate Last Admin    albuterol 2.5 mg /3 mL (0.083 %) nebulizer solution 3 mL  3 mL nebulization PRN Rosanne M Casal, APRN-CNP, DNP        dextrose 5 % in water (D5W) bolus  500 mL intravenous PRN Rosanne M Casal, APRN-CNP, DNP        diphenhydrAMINE (BENADryl) injection 50 mg  50 mg intravenous PRN Rosanne M Casal, APRN-CNP, DNP        EPINEPHrine (Epipen) injection syringe 0.3 mg  0.3 mg intramuscular q5 min PRN Rosanne M Casal, APRN-CNP, DNP        famotidine PF (Pepcid) injection 20 mg  20 mg intravenous Once PRN Rosanne M Casal, APRN-CNP, DNP        methylPREDNISolone sod suc / (SOLU-Medrol) 40 mg injection 40 mg  40 mg intravenous PRN Rosanne M Casal, APRN-CNP, DNP        sodium chloride 0.9 % bolus 500 mL  500 mL intravenous PRN Rosanne M Casal, APRN-CNP, DNP         Current Outpatient Medications on File Prior to Encounter   Medication Sig Dispense Refill    allopurinol (Zyloprim) 100 mg tablet Take 1 tablet (100 mg) by mouth once daily. (Patient taking differently: Take 1 tablet (100 mg) by mouth 2 times a day.) 90 tablet 1    amLODIPine (Norvasc) 5 mg tablet Take 1 tablet (5 mg) by mouth once daily.      cholecalciferol (Vitamin D-3) 50 MCG  (2000 UT) tablet Take 1 tablet (2,000 Units) by mouth once daily.      cyanocobalamin (Vitamin B-12) 1,000 mcg tablet Take 1 tablet (1,000 mcg) by mouth once daily.      epoetin alejandra (Procrit) 10,000 unit/mL injection Pt gets every Monday.      metoprolol tartrate (Lopressor) 25 mg tablet Take 1 tablet (25 mg) by mouth once daily. 30 tablet 11    pravastatin (Pravachol) 40 mg tablet TAKE 1 TABLET BY MOUTH ONCE DAILY AT BEDTIME 90 tablet 0    pregabalin (Lyrica) 100 mg capsule TAKE ONE CAPSULE BY MOUTH TWICE DAILY @9AM & 5PM 6 capsule 0    sevelamer carbonate (Renvela) 800 mg tablet Take 1 tablet (800 mg) by mouth 3 times a day with meals. Swallow tablet whole; do not crush, break, or chew. 30 tablet 1    SITagliptin phosphate (Januvia) 25 mg tablet Take 1 tablet (25 mg) by mouth once daily. Do not start before March 6, 2024. 30 tablet 1    ondansetron ODT (Zofran-ODT) 4 mg disintegrating tablet Take 2 tablets (8 mg) by mouth every 8 hours if needed for nausea or vomiting. 20 tablet 0    oxygen (O2) gas therapy Inhale 3 L/min once every 24 hours.      pioglitazone (Actos) 15 mg tablet TAKE 1 TABLET BY MOUTH ONCE DAILY 90 tablet 0    sennosides-docusate sodium (Ana-Colace) 8.6-50 mg tablet Take 2 tablets by mouth 2 times a day as needed for constipation. 30 tablet 0       Allergies  Codeine, Hydrocodone, Hydrocodone-acetaminophen, Meperidine (pf), Tramadol, Piperacillin-tazobactam, Adhesive tape-silicones, and Meperidine     Social History  She reports that she has never smoked. She has never used smokeless tobacco. She reports that she does not currently use alcohol. She reports that she does not use drugs.    Family History  No family history on file.     Review of Systems   Constitutional:  Negative for appetite change, chills, fatigue and fever.   Respiratory:  Negative for cough, chest tightness and shortness of breath.    Cardiovascular:  Negative for chest pain and palpitations.   Gastrointestinal:  Positive  "for nausea. Negative for abdominal distention, abdominal pain, constipation, diarrhea and vomiting.   Genitourinary:  Negative for difficulty urinating, flank pain and frequency.   Musculoskeletal:  Positive for back pain.   Neurological:  Negative for dizziness, light-headedness and headaches.       Last Recorded Vitals  Blood pressure 175/69, pulse 91, temperature 37 °C (98.6 °F), temperature source Temporal, resp. rate 18, height 1.549 m (5' 1\"), weight 67.7 kg (149 lb 4 oz), SpO2 98%.     Physical Exam  Vitals reviewed.   Constitutional:       General: She is not in acute distress.     Appearance: Normal appearance. She is not ill-appearing.   HENT:      Head: Normocephalic and atraumatic.      Mouth/Throat:      Mouth: Mucous membranes are moist.   Eyes:      General: No scleral icterus.     Conjunctiva/sclera: Conjunctivae normal.      Pupils: Pupils are equal, round, and reactive to light.   Cardiovascular:      Rate and Rhythm: Normal rate.      Pulses: Normal pulses.   Pulmonary:      Effort: Pulmonary effort is normal. No respiratory distress.   Chest:      Chest wall: No tenderness.   Abdominal:      General: There is no distension.      Palpations: Abdomen is soft.      Tenderness: There is no abdominal tenderness. There is no guarding or rebound.      Hernia: No hernia is present.   Musculoskeletal:         General: No swelling or signs of injury.   Skin:     General: Skin is warm and dry.   Neurological:      General: No focal deficit present.      Mental Status: She is alert and oriented to person, place, and time.          Relevant Results:  Labs:  Results for orders placed or performed during the hospital encounter of 04/13/24 (from the past 24 hour(s))   CBC and Auto Differential   Result Value Ref Range    WBC 7.6 4.4 - 11.3 x10*3/uL    nRBC 0.0 0.0 - 0.0 /100 WBCs    RBC 2.30 (L) 4.00 - 5.20 x10*6/uL    Hemoglobin 7.4 (L) 12.0 - 16.0 g/dL    Hematocrit 22.4 (L) 36.0 - 46.0 %    MCV 97 80 - 100 fL "    MCH 32.2 26.0 - 34.0 pg    MCHC 33.0 32.0 - 36.0 g/dL    RDW 23.4 (H) 11.5 - 14.5 %    Platelets 228 150 - 450 x10*3/uL    Neutrophils % 76.0 40.0 - 80.0 %    Immature Granulocytes %, Automated 0.5 0.0 - 0.9 %    Lymphocytes % 13.7 13.0 - 44.0 %    Monocytes % 8.2 2.0 - 10.0 %    Eosinophils % 1.2 0.0 - 6.0 %    Basophils % 0.4 0.0 - 2.0 %    Neutrophils Absolute 5.77 (H) 1.60 - 5.50 x10*3/uL    Immature Granulocytes Absolute, Automated 0.04 0.00 - 0.50 x10*3/uL    Lymphocytes Absolute 1.04 0.80 - 3.00 x10*3/uL    Monocytes Absolute 0.62 0.05 - 0.80 x10*3/uL    Eosinophils Absolute 0.09 0.00 - 0.40 x10*3/uL    Basophils Absolute 0.03 0.00 - 0.10 x10*3/uL   Basic metabolic panel   Result Value Ref Range    Glucose 200 (H) 74 - 99 mg/dL    Sodium 134 (L) 136 - 145 mmol/L    Potassium 3.5 3.5 - 5.3 mmol/L    Chloride 97 (L) 98 - 107 mmol/L    Bicarbonate 31 21 - 32 mmol/L    Anion Gap 10 10 - 20 mmol/L    Urea Nitrogen 16 6 - 23 mg/dL    Creatinine 1.46 (H) 0.50 - 1.05 mg/dL    eGFR 36 (L) >60 mL/min/1.73m*2    Calcium 8.8 8.6 - 10.3 mg/dL   Lipase   Result Value Ref Range    Lipase 36 9 - 82 U/L   Hepatic function panel   Result Value Ref Range    Albumin 4.0 3.4 - 5.0 g/dL    Bilirubin, Total 0.7 0.0 - 1.2 mg/dL    Bilirubin, Direct 0.1 0.0 - 0.3 mg/dL    Alkaline Phosphatase 70 33 - 136 U/L    ALT 16 7 - 45 U/L    AST 23 9 - 39 U/L    Total Protein 6.4 6.4 - 8.2 g/dL   Lactate   Result Value Ref Range    Lactate 1.1 0.4 - 2.0 mmol/L   B-Type Natriuretic Peptide   Result Value Ref Range     (H) 0 - 99 pg/mL   Blood Gas Venous Full Panel   Result Value Ref Range    POCT pH, Venous 7.44 (H) 7.33 - 7.43 pH    POCT pCO2, Venous 46 41 - 51 mm Hg    POCT pO2, Venous 47 (H) 35 - 45 mm Hg    POCT SO2, Venous 78 (H) 45 - 75 %    POCT Oxy Hemoglobin, Venous 76.2 (H) 45.0 - 75.0 %    POCT Hematocrit Calculated, Venous 21.0 (L) 36.0 - 46.0 %    POCT Sodium, Venous 134 (L) 136 - 145 mmol/L    POCT Potassium, Venous 3.4  (L) 3.5 - 5.3 mmol/L    POCT Chloride, Venous 99 98 - 107 mmol/L    POCT Ionized Calicum, Venous 1.17 1.10 - 1.33 mmol/L    POCT Glucose, Venous 204 (H) 74 - 99 mg/dL    POCT Lactate, Venous 1.1 0.4 - 2.0 mmol/L    POCT Base Excess, Venous 6.4 (H) -2.0 - 3.0 mmol/L    POCT HCO3 Calculated, Venous 31.2 (H) 22.0 - 26.0 mmol/L    POCT Hemoglobin, Venous 7.0 (L) 12.0 - 16.0 g/dL    POCT Anion Gap, Venous 7.0 (L) 10.0 - 25.0 mmol/L    Patient Temperature 37.0 degrees Celsius    FiO2 21 %   Troponin I, High Sensitivity, Initial   Result Value Ref Range    Troponin I, High Sensitivity 14 (H) 0 - 13 ng/L   Morphology   Result Value Ref Range    RBC Morphology See Below     Polychromasia Mild     Hypochromia Mild     Target Cells Few     Ovalocytes Few    Troponin, High Sensitivity, 1 Hour   Result Value Ref Range    Troponin I, High Sensitivity 16 (H) 0 - 13 ng/L   CBC   Result Value Ref Range    WBC 10.5 4.4 - 11.3 x10*3/uL    nRBC 0.0 0.0 - 0.0 /100 WBCs    RBC 2.38 (L) 4.00 - 5.20 x10*6/uL    Hemoglobin 7.7 (L) 12.0 - 16.0 g/dL    Hematocrit 23.4 (L) 36.0 - 46.0 %    MCV 98 80 - 100 fL    MCH 32.4 26.0 - 34.0 pg    MCHC 32.9 32.0 - 36.0 g/dL    RDW 23.7 (H) 11.5 - 14.5 %    Platelets 255 150 - 450 x10*3/uL   Renal function panel   Result Value Ref Range    Glucose 179 (H) 74 - 99 mg/dL    Sodium 135 (L) 136 - 145 mmol/L    Potassium 3.6 3.5 - 5.3 mmol/L    Chloride 97 (L) 98 - 107 mmol/L    Bicarbonate 29 21 - 32 mmol/L    Anion Gap 13 10 - 20 mmol/L    Urea Nitrogen 17 6 - 23 mg/dL    Creatinine 1.57 (H) 0.50 - 1.05 mg/dL    eGFR 33 (L) >60 mL/min/1.73m*2    Calcium 9.3 8.6 - 10.3 mg/dL    Phosphorus 3.8 2.5 - 4.9 mg/dL    Albumin 4.0 3.4 - 5.0 g/dL   Magnesium   Result Value Ref Range    Magnesium 1.77 1.60 - 2.40 mg/dL   POCT GLUCOSE   Result Value Ref Range    POCT Glucose 168 (H) 74 - 99 mg/dL     *Note: Due to a large number of results and/or encounters for the requested time period, some results have not been  displayed. A complete set of results can be found in Results Review.       Imaging:  US gallbladder  Narrative: Interpreted By:  Yahir Corrales,   STUDY:  US GALLBLADDER;  4/13/2024 3:59 am      INDICATION:  Signs/Symptoms:abd pain ct concerning for cholecystitis.      COMPARISON:  Correlation made to CT chest abdomen and pelvis from 04/13/2024.      ACCESSION NUMBER(S):  [id number]      ORDERING CLINICIAN:  MORELIA LANTIGUA      TECHNIQUE:  Multiple images of the right upper quadrant were obtained.      FINDINGS:  LIVER:  The liver measures 14.4 cm and is grossly unremarkable and free of  any focal lesions.          GALLBLADDER:  Cholelithiasis. Nonspecific gallbladder wall thickening measuring up  to 5.7 cm in thickness. Gallbladder nondistended. No pericholecystic  fluid or sonographic Luna's sign.          BILE DUCTS:  No evidence of intra or extrahepatic biliary dilatation is  identified; the common bile duct measures 4 mm.      PANCREAS:  The visualized pancreas is unremarkable in appearance.      RIGHT KIDNEY:  The right kidney measures 8.7 cm in length. Renal cortical atrophy  and hyperechogenicity suggesting medical renal disease. Small  cortical cysts. No hydronephrosis or renal calculi are seen.      Impression: Cholelithiasis and nonspecific gallbladder wall thickening, with  gallbladder nondistended. No pericholecystic fluid or sonographic  Luna's sign. Gallbladder wall thickening is favored to be secondary  to hepatocellular disease.      Renal cortical atrophy and hyperechogenicity suggesting medical renal  disease. No hydronephrosis. Small cortical cysts.          MACRO:  None          Signed by: Yahir Corrales 4/13/2024 4:05 AM  Dictation workstation:   [id number]  CT chest abdomen pelvis wo IV contrast  Narrative: Interpreted By:  Pankaj Flynn,   STUDY:  CT CHEST ABDOMEN PELVIS WO CONTRAST;  4/13/2024 2:38 am      INDICATION:  Signs/Symptoms:sob and pain.      COMPARISON:  None.      ACCESSION  NUMBER(S):  [id number]      ORDERING CLINICIAN:  MORELIA LANTIGUA      TECHNIQUE:  Contiguous axial images of the chest, abdomen, and pelvis were  obtained after without contrast. Coronal and sagittal reformatted  images were reconstructed from the axial data.      FINDINGS:  Assessment of the vascular, mediastinal, and abdominopelvic  structures limited without IV contrast.      CT CHEST:      MEDIASTINUM AND LYMPH NODES: The visualized thyroid is within normal  limits. No enlarged intrathoracic or axillary lymph nodes by imaging  criteria. No pneumomediastinum. The esophagus appears within normal  limits.      HEART: Normal size. Moderate triple-vessel coronary vascular  calcifications. Mild calcifications in the plane of the aortic and  mitral valves. No significant pericardial effusion. The blood pool is  hypodense to the myocardium suggesting anemia.      VESSELS: Vascular patency can not be assessed without IV contrast.  The aorta is normal in caliber. Mild enlargement of the main  pulmonary artery measuring 3.2 cm which may be seen in the setting of  pulmonary arterial hypertension. Moderate calcifications of the  aortic arch. A right IJ central venous catheter terminates in the mid  right atrium.      LUNG, AIRWAYS, PLEURA: The trachea and proximal mainstem bronchi are  patent. Dependent atelectatic changes bilaterally. Dependent patchy  opacity in the right upper lobe. No pleural effusion or pneumothorax.      CHEST WALL SOFT TISSUES: Calcification within the inferior left  breast.      OSSEOUS STRUCTURES: No acute osseous abnormality. Moderate  degenerative changes of the bilateral shoulders.          CT ABDOMEN/PELVIS:      LIVER: No significant parenchymal abnormality.      BILE DUCTS: No significant intrahepatic or extrahepatic dilatation.      GALLBLADDER: Cholelithiasis and mild gallbladder wall thickening.  Sonographic correlation recommended.      PANCREAS: No significant abnormality.      SPLEEN:  No significant abnormality.      ADRENALS: No significant abnormality.      KIDNEYS, URETERS, BLADDER: Mild bilateral renal cortical atrophy.  Nonspecific perinephric fat stranding bilaterally. No hydronephrosis  or hydroureter. Bladder is unremarkable.      REPRODUCTIVE ORGANS: Hysterectomy.      GI: No bowel obstruction. No significant bowel wall thickening or  adjacent inflammatory change. Diverticulosis of the sigmoid colon  without evidence of acute diverticulitis. The appendix is not  visualized.      VESSELS: Moderate calcifications of the aorta and iliac vessels. No  aneurysmal dilatation. Vascular patency can not be assessed without  IV contrast.      PERITONEUM/RETROPERITONEUM: No ascites, free air, or fluid collection.      LYMPH NODES: No enlarged lymph nodes.      ABDOMINAL WALL: No significant abnormality.      OSSEOUS STRUCTURES: Partially visualized right femoral intramedullary  nail with transcervical screw. Posterior spinal fusion hardware  spanning from L2-L5. Associated laminectomy changes at L3-L5.  Intervertebral body spacers at L4-L5 and L5-S1. No acute osseous  abnormality identified.      Impression: 1. Cholelithiasis and mild gallbladder wall thickening. Sonographic  correlation advised.  2. Small patchy opacity in the dependent portion of the right upper  lobe which may represent atelectasis or infiltrate.  3. Additional findings and discussion as above.      MACRO:  None.      Signed by: Pankaj Flynn 4/13/2024 3:06 AM  Dictation workstation:   [id number]  XR chest 1 view  Narrative: Interpreted By:  Yahir Corrales,   STUDY:  XR CHEST 1 VIEW;  4/13/2024 1:55 am      INDICATION:  Signs/Symptoms:cp.      COMPARISON:  03/03/2024.      ACCESSION NUMBER(S):  [id number]      ORDERING CLINICIAN:  MORELIA LANTIGUA      FINDINGS:  AP radiograph of the chest was provided.      Leads overlie the chest, partially obscuring the field-of-view.      Right IJ dual lumen hemodialysis catheter terminates  in the right  atrium.      CARDIOMEDIASTINAL SILHOUETTE:  Cardiomediastinal silhouette is normal in size and configuration.      LUNGS:  Similar asymmetric elevation of the right hemidiaphragm. Lungs appear  clear. No pleural effusion or pneumothorax.      ABDOMEN:  No remarkable upper abdominal findings.      BONES:  No acute osseous changes.      Impression: 1.  No evidence of acute cardiopulmonary process.              MACRO:  None      Signed by: Yahir Corrales 4/13/2024 2:16 AM  Dictation workstation:   [id number]      Assessment and Plan  Principal Problem:    Cholecystitis    79 y.o. female with sudden unset right back/subscapular pain and gallbladder wall thickening seen on imaging  - Abdominal exam benign and patient reports not abdominal pain  - Imaging showed mild gallbladder wall thickening but no duct dilation, pericholecystic fluid or Luna's sign  - LFTs and leukocytes wnl.  - No acute surgical interventions at this time, if symptoms return and or include, nausea, vomiting, appetite change, fevers, chills, or abdominal pain then return to the ED for evaluation  - General Surgery will sign off    Discussed with attending Dr. Sydney Burgess, DO - PGY2  General Surgery

## 2024-04-13 NOTE — PROGRESS NOTES
Occupational Therapy    Evaluation    Patient Name: Tana Hargrove  MRN: [id number]  Today's Date: 4/13/2024  Time Calculation  Start Time: 1048  Stop Time: 1105  Time Calculation (min): 17 min    Assessment  IP OT Assessment  OT Assessment: pt. unsafe to care for self home alone at this time  Prognosis: Good  Barriers to Discharge: Decreased caregiver support  End of Session Communication: Bedside nurse  End of Session Patient Position: Bed, 3 rail up, Alarm on    Plan:  Treatment Interventions: Endurance training, Functional transfer training  OT Frequency: 4 times per week  OT Discharge Recommendations: Moderate intensity level of continued care  OT Recommended Transfer Status: Minimal assist  OT - OK to Discharge: Yes ((when medically approp))    Subjective     Current Problem:  1. Cholecystitis        2. Gall stones            General:  General  Reason for Referral: cholecystitis  Referred By: Anthony  Past Medical History Relevant to Rehab: Hx. of CAD, COPD, CKD with HD, anemia, OA  Co-Treatment: PT  Co-Treatment Reason: to maximize safe mobility  Prior to Session Communication: Bedside nurse  Patient Position Received: Bed, 3 rail up, Alarm on  General Comment: Pt. awakened, state she didn't sleep at all last night, 2 liters O2    Precautions:  Medical Precautions: Fall precautions, Oxygen therapy device and L/min    Vital Signs:see nurses notes, pulsox 87 after mobility without O2        Pain:  Pain Assessment  Pain Assessment: 0-10  Pain Score: 0 - No pain    Objective     Cognition:  Overall Cognitive Status: Impaired  Safety/Judgement: Exceptions to WFL  Processing Speed:  (slow with all responses and mobility, as if unable to fully awaken)             Home Living:  Type of Home: House  Lives With: Alone  Home Adaptive Equipment: Walker rolling or standard (rollator)  Home Layout: One level  Home Access: Level entry     Prior Function:  Level of [place]: Independent with ADLs and functional transfers,  Independent with homemaking with ambulation  ADL Assistance: Independent  Homemaking Assistance: Independent  Ambulatory Assistance: Independent    IADL History:  Homemaking Responsibilities: Yes  Meal Prep Responsibility: Primary  Cleaning Responsibility: Secondary    ADL:  Toileting Deficit:  (Min.A X 2 without FWW, Min.A from bathroom with FWW)    Activity Tolerance:  Endurance: Decreased tolerance for upright activites    Bed Mobility/Transfers:   Bed Mobility  Bed Mobility: Yes (Mod.A supine-to-sit)  Transfers  Transfer: Yes (Min.A to stand from toilet)    Ambulation/Gait Training:  Functional Mobility  Functional Mobility Performed: Yes (Min.A X 2 HHA or Min.A with FWW)    Sitting Balance:WFL EOB        Standing Balance:  Dynamic Standing Balance  Dynamic Standing-Balance Support:  (CGA during stand)    Sensation:  Light Touch: No apparent deficits    Strength:  Strength Comments: UEs WFL      Coordination:  Movements are Fluid and Coordinated: Yes     Hand Function:WNL          Outcome Measures: [facility] Daily Activity  Putting on and taking off regular lower body clothing: A lot  Bathing (including washing, rinsing, drying): A lot  Putting on and taking off regular upper body clothing: None  Toileting, which includes using toilet, bedpan or urinal: A lot  Taking care of personal grooming such as brushing teeth: A little  Eating Meals: None  Daily Activity - Total Score: 17                    EDUCATION:     Education Documentation  ADL Training, taught by Silvia Kang OT at 4/13/2024 12:53 PM.  Learner: Patient  Readiness: Acceptance  Method: Demonstration  Response: Needs Reinforcement  Comment: toileting safety with FWW and O2    Education Comments  No comments found.        Goals:   Encounter Problems       Encounter Problems (Active)       COGNITION/SAFETY       demo. WNL safety and cognition for home alone (Progressing)       Start:  04/13/24    Expected End:  04/20/24               MOBILITY        Demo. safe supervised func. moiblity for toileting and ADLs with FWW.and O2 PRN (Progressing)       Start:  04/13/24    Expected End:  04/20/24

## 2024-04-13 NOTE — ED PROVIDER NOTES
HPI   Chief Complaint   Patient presents with   • Shoulder Pain     Pt has right shoulder/breast pain, n/v       HPI: []  79-year-old white female with a history of hypertension, ESRD on hemodialysis Monday Wednesday Friday last hemodialysis was today on Friday recently hospitalized work today comes in with severe back pain abdominal pain and vomiting.  Pain localized right parascapular area and right upper quadrant.  With severe vomiting.  No fever no chills no chest pain pressure heaviness no shortness breath no incontinence seizures no syncope or near syncope no hematemesis melena hematochezia.    Past history: Hypertension, ESRD, COPD, dyslipidemia, myelodysplastic syndrome, diabetes type 2, prior to me breast biopsy  Social: Patient denies current tobacco alcohol drug abuse.  REVIEW OF SYSTEMS:    GENERAL.: No weight loss, fatigue, anorexia, insomnia, fever.    EYES: No vision loss, double vision, drainage, eye pain.    ENT: No pharyngitis, dry mouth.    CARDIOPULMONARY: No chest pain, palpitations, syncope, near syncope. No shortness of breath, cough, hemoptysis.    GI: Positive for abdominal pain, change in bowel habits, melena, hematemesis, hematochezia, nausea, positive for vomiting, diarrhea.    : No discharge, dysuria, frequency, urgency, hematuria.    MS: No limb pain, joint pain, joint swelling.  Positive right parascapular pain    SKIN: No rashes.    PSYCH: No depression, anxiety, suicidality, homicidality.    Review of systems is otherwise negative unless stated above or in history of present illness.  Social history, family history, allergies reviewed.  PHYSICAL EXAM:    GENERAL: Vitals noted, moderate distress. Alert and oriented  x 3. Non-toxic.  Appears uncomfortable    EENT: TMs clear. Posterior oropharynx unremarkable. No meningismus. No LAD.     NECK: Supple. Nontender. No midline tenderness.     CARDIAC: Regular, rate, rhythm. No murmurs rubs or gallops. No JVD    PULMONARY: Lungs clear  bilaterally with good aeration. No wheezes rales or rhonchi. No respiratory distress.  No tachypnea stridor or retractions    ABDOMEN: Soft, nonsurgical. Nontender. No peritoneal signs. Normoactive bowel sounds. No pulsatile masses.  Negative Luna sign, negative CVA tenderness    EXTREMITIES: No peripheral edema. Negative Homans bilaterally, no cords.  2+ bounding pulses well-perfused    SKIN: No rash. Intact.  Right anterior chest wall hemodialysis catheter anchored in place    NEURO: No focal neurologic deficits, NIH score of 0. Cranial nerves normal as tested from II through XII.     MEDICAL DECISION MAKING:  EKG on my interpretation shows normal sinus rhythm normal axis rate mid 80s with no acute ischemic changes.    CBC shows no leukocytosis or worsening anemia hematocrit 22 chemistry show ESRD potassium is normal LFTs are normal lipase normal troponin remains flat around 14 and 15, chest x-ray negative, CT chest and pelvis does show lots of incidental findings but does show cholelithiasis with thickened gallbladder wall ultrasound gallbladder does show cholelithiasis but no cholecystitis.    Treatment in ED: IV established, given multiple Dilaudid doses.    ED course: Patient pain remains poorly controlled.  Consult: Surgery consulted they recommended admission to the medical service and they will see the patient on an inpatient basis.    Impression: #1 symptomatic cholelithiasis    Plans and MDM: 79-year-old female history of hypertension ESRD chronic anemia/myelodysplastic syndrome comes in with severe parascapular abdominal pain and vomiting.  Workup is concerning for cholelithiasis no obvious cholecystitis LFTs are normal lipase is normal no leukocytosis but she is requiring Dilaudid for pain control family is concerned about taking her home I gave the patient option to be discharged home but she wants to stay in the family wants to stay surgery been consulted and patient to be hospital for further care  no concern for cholangitis cholecystitis or choledocholithiasis.                          Tasneem Coma Scale Score: 15                     Patient History   Past Medical History:   Diagnosis Date   • Abnormal levels of other serum enzymes     Elevated liver enzymes   • Acute kidney failure (CMS-HCC)    • Anemia    • CKD (chronic kidney disease)    • COPD (chronic obstructive pulmonary disease) (Multi)    • Disease of blood and blood-forming organs, unspecified     Bone marrow disorder   • HLD (hyperlipidemia)    • MDS (myelodysplastic syndrome) (Multi)    • Personal history of other diseases of the musculoskeletal system and connective tissue     History of muscle pain   • Personal history of other specified conditions     History of insomnia   • Personal history of other specified conditions     History of edema   • Type 2 diabetes mellitus (Multi)      Past Surgical History:   Procedure Laterality Date   • APPENDECTOMY     • BREAST BIOPSY Left     left excisional   • BREAST LUMPECTOMY     • COLONOSCOPY     • HYSTERECTOMY     • MR HEAD ANGIO WO IV CONTRAST  11/20/2020    MR HEAD ANGIO WO IV CONTRAST 11/20/2020 [facility]   • MR NECK ANGIO WO IV CONTRAST  11/20/2020    MR NECK ANGIO WO IV CONTRAST 11/20/2020 [facility] CLINICAL LEGAC[facility]   • OOPHORECTOMY     • OTHER SURGICAL HISTORY  12/13/2019    Oophorectomy bilateral   • OTHER SURGICAL HISTORY  12/13/2019    Tubal ligation   • OTHER SURGICAL HISTORY Bilateral 12/13/2019    Knee replacement   • OTHER SURGICAL HISTORY Left 12/13/2019    Shoulder surgery   • OTHER SURGICAL HISTORY  12/13/2019    Hysterectomy   • OTHER SURGICAL HISTORY  12/13/2019    Lumpectomy   • OTHER SURGICAL HISTORY Right 12/13/2019    Foot surgery   • OTHER SURGICAL HISTORY  04/16/2021    Back surgery     No family history on file.  Social History     Tobacco Use   • Smoking status: Never   • Smokeless tobacco: Never   Vaping Use   • Vaping status: Never Used   Substance Use Topics   • Alcohol  use: Not Currently   • Drug use: Never       Physical Exam   ED Triage Vitals [04/13/24 0058]   Temperature Heart Rate Respirations BP   37.2 °C (99 °F) 87 (!) 2 (!) 174/92      Pulse Ox Temp src Heart Rate Source Patient Position   96 % -- -- --      BP Location FiO2 (%)     -- --       Physical Exam    ED Course & [facility]   ED Course as of 04/16/24 0809   Sat Apr 13, 2024   0550 EKG on my interpretation shows normal sinus rhythm normal axis rate mid 80s with no acute ischemic change.  CBC with shows no leukocytosis worsening anemia hematocrit down to 22, chemistry show ESRD, lipase normal LFTs normal CT chest abdomen pelvis shows gallstones ultrasound the gallbladder does confirm gallstones thickened gallbladder wall no cholecystitis, patient was given Dilaudid and pain remains poorly controlled surgery consulted they recommended IMS admission and inpatient consultation. [MT]      ED Course User Index  [MT] Alena Sullivan MD         Diagnoses as of 04/16/24 0809   Cholecystitis - no signs of cholecytitis. Surgery signed off       Medical Decision Making      Procedure  Procedures     Alena Sullivan MD  04/13/24 0556       Alena Sullivan MD  04/16/24 0809

## 2024-04-13 NOTE — PROGRESS NOTES
Home O2 Evaluation:    SpO2 on room air at rest:     91%    SpO2 on room air with exertion:    88 %    SpO2 on oxygen at rest:  95   % 2L    SpO2 on oxygen with exertion:     92%    SpO2 on 4L/min O2 with exertion:  na  %    Ambulation distance:     95 ft. Pt was unable to walk any more after 95ft.    Recommended O2 device: nasal cannula     Recommended O2 L/min: RA at rest. 2L with exertion.    Recommended FiO2 %: 21% at rest. 28% with exertion

## 2024-04-13 NOTE — PROGRESS NOTES
Tana Hargrove is a 79 y.o. female on day 0 of admission presenting with Cholecystitis.      Subjective   History Of Present Illness     Recent admissions:  1.  02/08/2024 till 02/11/2024: Acute renal failure superimposed on CKD, acute on chronic deconditioning with recommendations for SNF but patient refused  2.  02/14/2024 till 02/18/2024: Sepsis without shock with concerns for cellulitis to right great toe, acute on chronic diastolic heart failure, CKD 4  3.  02/26/2024 till 03/05/2024: ESTEE requiring hemodialysis, anemia secondary to mild dysplastic syndrome as well as acute bleeding from tunneled dialysis catheter in the right upper chest wall     Tana Hargrove is a 79 y.o.  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  She presented to the ED after awakening on the evening of 04/12/2024 with sudden onset right lower back pain.  Patient stated that she had very minimal discomfort in her abdomen and had nausea as well as an episode of vomiting but did not have severe back pain in the abdomen as reported by the ED.  She states that the pain was more so at the tip of her scapula and radiating somewhat into her right upper quadrant but more so in the back.  She stated that during the day she had had a cheeseburger for dinner without any symptoms.  She denied any recent sick contacts, chemical/environmental exposures, changes in dietary habits or any recent traumatic events/falls other than noted above.  The only abdominal surgeries that she had noted was a hysterectomy and appendectomy in the very distant past.  She denied any fevers, chills, night sweats, vision changes, auditory  changes, change in taste/smell, loss of bowel/bladder control, loss consciousness, dizziness, vertigo, syncope, seizure-like activity, chest pain, palpitations, shortness of breath, coughing, wheezing, congestion, hemoptysis, hematemesis, diarrhea, constipation, dysuria, hematuria, dyschezia, hematochezia or any lateralizing motor/sensory deficits other than noted above.      She unfortunately was set to be traveling to [place] this morning to attend the  of her sister and states that she would like to hopefully be discharged today if possible but understands if she does need to stay.  She had also stated that she already has dialysis set up to be done in [place] while she is down there.     ED course:  1.  Vital signs on presentation: Temperature 37.2 °C, heart rate of 87, respirations of 20, pulse ox of 96% on room air, blood pressure 174/92  2.  Per discussion with the ED physician the patient had severe intractable abdominal pain that was crippling which prompted admission despite interventions with Dilaudid.  3.  WBC of 7.6 with a repeat of 10.5  4.  Hemoglobin is 7.4 with a repeat of 7.7  5.  Glucose of 200 with a repeat of 179  6.  Sodium of 134 with a repeat 135  7.  Potassium of 3.5 with a repeat of 3.6  8.  Creatinine of 1.46 with a repeat of 1.57  9.  LFTs were within range per lab  10.  Total bilirubin of 0.7  11.  BNP of 330  12.  Troponin of 14 with a repeat of 16  13.  VBG: pH is 7.44, pCO2 46, pO2 47, SO2 of 78, calculated bicarb of 31.2  14.  CXR: Noted no acute cardiopulmonary processes  15.  CT chest/abdomen/pelvis without IV contrast: Cholelithiasis with mild gallbladder wall thickening, small patchy opacity in the dependent portion of the right upper lobe which may represent atelectasis versus infiltrate  16.  Ultrasound gallbladder: Cholelithiasis with nonspecific gallbladder wall thickening with a gallbladder that is nondistended, no pericholecystic fluid or sonographic  Luna sign, gallbladder wall thickening is favored to be secondary hepatocellular disease, renal cortical atrophy and hyper echogenicity suggesting medical renal disease, no hydronephrosis, small cortical cysts  17.  Patient received 2 doses of IV Dilaudid in the ED per the discussion with the ED physician.    4/13/2024: Patient remained NC oxygen 2 L ( no oxygen before admission). She feels better and improved with right side back pain. Surgery Dr. Grimes thought patient has no signs nor symptoms for concerns of biliary colic. Surgery has signed off and ok to discharge. Patient will stay due to hypoxemia. Home O2 evaluation ordered and monitor overnight.       Objective     Last Recorded Vitals  /69 (BP Location: Left arm, Patient Position: Lying)   Pulse 81   Temp 36.8 °C (98.2 °F) (Temporal)   Resp 18   Wt 67.7 kg (149 lb 4 oz)   SpO2 97%   Intake/Output last 3 Shifts:    Intake/Output Summary (Last 24 hours) at 4/13/2024 1421  Last data filed at 4/13/2024 1300  Gross per 24 hour   Intake 450 ml   Output --   Net 450 ml       Admission Weight  Weight: 67.7 kg (149 lb 4 oz) (04/13/24 0603)    Daily Weight  04/13/24 : 67.7 kg (149 lb 4 oz)    Image Results  US gallbladder  Narrative: Interpreted By:  Yahir Corrales,   STUDY:  US GALLBLADDER;  4/13/2024 3:59 am      INDICATION:  Signs/Symptoms:abd pain ct concerning for cholecystitis.      COMPARISON:  Correlation made to CT chest abdomen and pelvis from 04/13/2024.      ACCESSION NUMBER(S):  [id number]      ORDERING CLINICIAN:  MORELIA LANTIGUA      TECHNIQUE:  Multiple images of the right upper quadrant were obtained.      FINDINGS:  LIVER:  The liver measures 14.4 cm and is grossly unremarkable and free of  any focal lesions.          GALLBLADDER:  Cholelithiasis. Nonspecific gallbladder wall thickening measuring up  to 5.7 cm in thickness. Gallbladder nondistended. No pericholecystic  fluid or sonographic Luna's sign.          BILE DUCTS:  No  evidence of intra or extrahepatic biliary dilatation is  identified; the common bile duct measures 4 mm.      PANCREAS:  The visualized pancreas is unremarkable in appearance.      RIGHT KIDNEY:  The right kidney measures 8.7 cm in length. Renal cortical atrophy  and hyperechogenicity suggesting medical renal disease. Small  cortical cysts. No hydronephrosis or renal calculi are seen.      Impression: Cholelithiasis and nonspecific gallbladder wall thickening, with  gallbladder nondistended. No pericholecystic fluid or sonographic  Luna's sign. Gallbladder wall thickening is favored to be secondary  to hepatocellular disease.      Renal cortical atrophy and hyperechogenicity suggesting medical renal  disease. No hydronephrosis. Small cortical cysts.          MACRO:  None          Signed by: Yahir Corrales 4/13/2024 4:05 AM  Dictation workstation:   [id number]  CT chest abdomen pelvis wo IV contrast  Narrative: Interpreted By:  Pankaj Flynn,   STUDY:  CT CHEST ABDOMEN PELVIS WO CONTRAST;  4/13/2024 2:38 am      INDICATION:  Signs/Symptoms:sob and pain.      COMPARISON:  None.      ACCESSION NUMBER(S):  [id number]      ORDERING CLINICIAN:  MORELIA LANTIGUA      TECHNIQUE:  Contiguous axial images of the chest, abdomen, and pelvis were  obtained after without contrast. Coronal and sagittal reformatted  images were reconstructed from the axial data.      FINDINGS:  Assessment of the vascular, mediastinal, and abdominopelvic  structures limited without IV contrast.      CT CHEST:      MEDIASTINUM AND LYMPH NODES: The visualized thyroid is within normal  limits. No enlarged intrathoracic or axillary lymph nodes by imaging  criteria. No pneumomediastinum. The esophagus appears within normal  limits.      HEART: Normal size. Moderate triple-vessel coronary vascular  calcifications. Mild calcifications in the plane of the aortic and  mitral valves. No significant pericardial effusion. The blood pool is  hypodense to the  myocardium suggesting anemia.      VESSELS: Vascular patency can not be assessed without IV contrast.  The aorta is normal in caliber. Mild enlargement of the main  pulmonary artery measuring 3.2 cm which may be seen in the setting of  pulmonary arterial hypertension. Moderate calcifications of the  aortic arch. A right IJ central venous catheter terminates in the mid  right atrium.      LUNG, AIRWAYS, PLEURA: The trachea and proximal mainstem bronchi are  patent. Dependent atelectatic changes bilaterally. Dependent patchy  opacity in the right upper lobe. No pleural effusion or pneumothorax.      CHEST WALL SOFT TISSUES: Calcification within the inferior left  breast.      OSSEOUS STRUCTURES: No acute osseous abnormality. Moderate  degenerative changes of the bilateral shoulders.          CT ABDOMEN/PELVIS:      LIVER: No significant parenchymal abnormality.      BILE DUCTS: No significant intrahepatic or extrahepatic dilatation.      GALLBLADDER: Cholelithiasis and mild gallbladder wall thickening.  Sonographic correlation recommended.      PANCREAS: No significant abnormality.      SPLEEN: No significant abnormality.      ADRENALS: No significant abnormality.      KIDNEYS, URETERS, BLADDER: Mild bilateral renal cortical atrophy.  Nonspecific perinephric fat stranding bilaterally. No hydronephrosis  or hydroureter. Bladder is unremarkable.      REPRODUCTIVE ORGANS: Hysterectomy.      GI: No bowel obstruction. No significant bowel wall thickening or  adjacent inflammatory change. Diverticulosis of the sigmoid colon  without evidence of acute diverticulitis. The appendix is not  visualized.      VESSELS: Moderate calcifications of the aorta and iliac vessels. No  aneurysmal dilatation. Vascular patency can not be assessed without  IV contrast.      PERITONEUM/RETROPERITONEUM: No ascites, free air, or fluid collection.      LYMPH NODES: No enlarged lymph nodes.      ABDOMINAL WALL: No significant abnormality.       OSSEOUS STRUCTURES: Partially visualized right femoral intramedullary  nail with transcervical screw. Posterior spinal fusion hardware  spanning from L2-L5. Associated laminectomy changes at L3-L5.  Intervertebral body spacers at L4-L5 and L5-S1. No acute osseous  abnormality identified.      Impression: 1. Cholelithiasis and mild gallbladder wall thickening. Sonographic  correlation advised.  2. Small patchy opacity in the dependent portion of the right upper  lobe which may represent atelectasis or infiltrate.  3. Additional findings and discussion as above.      MACRO:  None.      Signed by: Pankaj Flynn 4/13/2024 3:06 AM  Dictation workstation:   [id number]  XR chest 1 view  Narrative: Interpreted By:  Yahir Corrales,   STUDY:  XR CHEST 1 VIEW;  4/13/2024 1:55 am      INDICATION:  Signs/Symptoms:cp.      COMPARISON:  03/03/2024.      ACCESSION NUMBER(S):  [id number]      ORDERING CLINICIAN:  MORELIA LANTIGUA      FINDINGS:  AP radiograph of the chest was provided.      Leads overlie the chest, partially obscuring the field-of-view.      Right IJ dual lumen hemodialysis catheter terminates in the right  atrium.      CARDIOMEDIASTINAL SILHOUETTE:  Cardiomediastinal silhouette is normal in size and configuration.      LUNGS:  Similar asymmetric elevation of the right hemidiaphragm. Lungs appear  clear. No pleural effusion or pneumothorax.      ABDOMEN:  No remarkable upper abdominal findings.      BONES:  No acute osseous changes.      Impression: 1.  No evidence of acute cardiopulmonary process.              MACRO:  None      Signed by: Yahir Corrales 4/13/2024 2:16 AM  Dictation workstation:   [id number]      Physical Exam  Constitutional:       General: She is not in acute distress.     Appearance: Normal appearance.   HENT:      Head: Normocephalic.      Mouth/Throat:      Mouth: Mucous membranes are moist.      Pharynx: Oropharynx is clear.   Eyes:      Pupils: Pupils are equal, round, and reactive to light.    Cardiovascular:      Rate and Rhythm: Normal rate and regular rhythm.      Heart sounds: Normal heart sounds. No murmur heard.     No gallop.   Pulmonary:      Effort: Pulmonary effort is normal.      Breath sounds: Normal breath sounds.   Abdominal:      General: Bowel sounds are normal. There is no distension.      Palpations: Abdomen is soft.      Tenderness: There is no abdominal tenderness.   Musculoskeletal:         General: No swelling. Normal range of motion.      Cervical back: Neck supple. No rigidity.   Skin:     General: Skin is warm and dry.   Neurological:      General: No focal deficit present.      Mental Status: She is alert.      Cranial Nerves: No cranial nerve deficit.      Sensory: No sensory deficit.   Psychiatric:         Mood and Affect: Mood normal.         Behavior: Behavior normal.         Relevant Results             Scheduled medications  amLODIPine, 5 mg, oral, Daily  heparin (porcine), 5,000 Units, subcutaneous, q8h JOSE DE JESUS  insulin lispro, 0-10 Units, subcutaneous, With meals & nightly  metoprolol tartrate, 25 mg, oral, Daily  pravastatin, 40 mg, oral, Nightly  pregabalin, 100 mg, oral, BID      Continuous medications     PRN medications  PRN medications: dextrose, dextrose, glucagon, glucagon, HYDROmorphone, HYDROmorphone  Results for orders placed or performed during the hospital encounter of 04/13/24 (from the past 96 hour(s))   CBC and Auto Differential   Result Value Ref Range    WBC 7.6 4.4 - 11.3 x10*3/uL    nRBC 0.0 0.0 - 0.0 /100 WBCs    RBC 2.30 (L) 4.00 - 5.20 x10*6/uL    Hemoglobin 7.4 (L) 12.0 - 16.0 g/dL    Hematocrit 22.4 (L) 36.0 - 46.0 %    MCV 97 80 - 100 fL    MCH 32.2 26.0 - 34.0 pg    MCHC 33.0 32.0 - 36.0 g/dL    RDW 23.4 (H) 11.5 - 14.5 %    Platelets 228 150 - 450 x10*3/uL    Neutrophils % 76.0 40.0 - 80.0 %    Immature Granulocytes %, Automated 0.5 0.0 - 0.9 %    Lymphocytes % 13.7 13.0 - 44.0 %    Monocytes % 8.2 2.0 - 10.0 %    Eosinophils % 1.2 0.0 - 6.0 %     Basophils % 0.4 0.0 - 2.0 %    Neutrophils Absolute 5.77 (H) 1.60 - 5.50 x10*3/uL    Immature Granulocytes Absolute, Automated 0.04 0.00 - 0.50 x10*3/uL    Lymphocytes Absolute 1.04 0.80 - 3.00 x10*3/uL    Monocytes Absolute 0.62 0.05 - 0.80 x10*3/uL    Eosinophils Absolute 0.09 0.00 - 0.40 x10*3/uL    Basophils Absolute 0.03 0.00 - 0.10 x10*3/uL   Basic metabolic panel   Result Value Ref Range    Glucose 200 (H) 74 - 99 mg/dL    Sodium 134 (L) 136 - 145 mmol/L    Potassium 3.5 3.5 - 5.3 mmol/L    Chloride 97 (L) 98 - 107 mmol/L    Bicarbonate 31 21 - 32 mmol/L    Anion Gap 10 10 - 20 mmol/L    Urea Nitrogen 16 6 - 23 mg/dL    Creatinine 1.46 (H) 0.50 - 1.05 mg/dL    eGFR 36 (L) >60 mL/min/1.73m*2    Calcium 8.8 8.6 - 10.3 mg/dL   Lipase   Result Value Ref Range    Lipase 36 9 - 82 U/L   Hepatic function panel   Result Value Ref Range    Albumin 4.0 3.4 - 5.0 g/dL    Bilirubin, Total 0.7 0.0 - 1.2 mg/dL    Bilirubin, Direct 0.1 0.0 - 0.3 mg/dL    Alkaline Phosphatase 70 33 - 136 U/L    ALT 16 7 - 45 U/L    AST 23 9 - 39 U/L    Total Protein 6.4 6.4 - 8.2 g/dL   Lactate   Result Value Ref Range    Lactate 1.1 0.4 - 2.0 mmol/L   B-Type Natriuretic Peptide   Result Value Ref Range     (H) 0 - 99 pg/mL   Blood Gas Venous Full Panel   Result Value Ref Range    POCT pH, Venous 7.44 (H) 7.33 - 7.43 pH    POCT pCO2, Venous 46 41 - 51 mm Hg    POCT pO2, Venous 47 (H) 35 - 45 mm Hg    POCT SO2, Venous 78 (H) 45 - 75 %    POCT Oxy Hemoglobin, Venous 76.2 (H) 45.0 - 75.0 %    POCT Hematocrit Calculated, Venous 21.0 (L) 36.0 - 46.0 %    POCT Sodium, Venous 134 (L) 136 - 145 mmol/L    POCT Potassium, Venous 3.4 (L) 3.5 - 5.3 mmol/L    POCT Chloride, Venous 99 98 - 107 mmol/L    POCT Ionized Calicum, Venous 1.17 1.10 - 1.33 mmol/L    POCT Glucose, Venous 204 (H) 74 - 99 mg/dL    POCT Lactate, Venous 1.1 0.4 - 2.0 mmol/L    POCT Base Excess, Venous 6.4 (H) -2.0 - 3.0 mmol/L    POCT HCO3 Calculated, Venous 31.2 (H) 22.0 -  26.0 mmol/L    POCT Hemoglobin, Venous 7.0 (L) 12.0 - 16.0 g/dL    POCT Anion Gap, Venous 7.0 (L) 10.0 - 25.0 mmol/L    Patient Temperature 37.0 degrees Celsius    FiO2 21 %   Troponin I, High Sensitivity, Initial   Result Value Ref Range    Troponin I, High Sensitivity 14 (H) 0 - 13 ng/L   Morphology   Result Value Ref Range    RBC Morphology See Below     Polychromasia Mild     Hypochromia Mild     Target Cells Few     Ovalocytes Few    Troponin, High Sensitivity, 1 Hour   Result Value Ref Range    Troponin I, High Sensitivity 16 (H) 0 - 13 ng/L   CBC   Result Value Ref Range    WBC 10.5 4.4 - 11.3 x10*3/uL    nRBC 0.0 0.0 - 0.0 /100 WBCs    RBC 2.38 (L) 4.00 - 5.20 x10*6/uL    Hemoglobin 7.7 (L) 12.0 - 16.0 g/dL    Hematocrit 23.4 (L) 36.0 - 46.0 %    MCV 98 80 - 100 fL    MCH 32.4 26.0 - 34.0 pg    MCHC 32.9 32.0 - 36.0 g/dL    RDW 23.7 (H) 11.5 - 14.5 %    Platelets 255 150 - 450 x10*3/uL   Renal function panel   Result Value Ref Range    Glucose 179 (H) 74 - 99 mg/dL    Sodium 135 (L) 136 - 145 mmol/L    Potassium 3.6 3.5 - 5.3 mmol/L    Chloride 97 (L) 98 - 107 mmol/L    Bicarbonate 29 21 - 32 mmol/L    Anion Gap 13 10 - 20 mmol/L    Urea Nitrogen 17 6 - 23 mg/dL    Creatinine 1.57 (H) 0.50 - 1.05 mg/dL    eGFR 33 (L) >60 mL/min/1.73m*2    Calcium 9.3 8.6 - 10.3 mg/dL    Phosphorus 3.8 2.5 - 4.9 mg/dL    Albumin 4.0 3.4 - 5.0 g/dL   Magnesium   Result Value Ref Range    Magnesium 1.77 1.60 - 2.40 mg/dL   POCT GLUCOSE   Result Value Ref Range    POCT Glucose 168 (H) 74 - 99 mg/dL   POCT GLUCOSE   Result Value Ref Range    POCT Glucose 104 (H) 74 - 99 mg/dL     *Note: Due to a large number of results and/or encounters for the requested time period, some results have not been displayed. A complete set of results can be found in Results Review.       Assessment/Plan                  Principal Problem:    Cholecystitis    1. Cholecystitis      no signs of cholecytitis. Surgery signed off      2. Hypoxia      on NC  oxygen, wean as possible, order home oxygen eval.      3. Hypertension, essential      home meds      4. Stage 4 chronic kidney disease (Multi)      nephrology note appreciated.      5. Type 2 diabetes mellitus with stage 4 chronic kidney disease, without long-term current use of insulin (Multi)      SSL and monitor      6. Chronic diastolic heart failure of unknown etiology (Multi)      compensated      7. Chronic bronchitis, unspecified chronic bronchitis type (Multi)      resume home meds                      Tigist Gaines MD

## 2024-04-13 NOTE — CARE PLAN
Problem: Pain - Adult  Goal: Verbalizes/displays adequate comfort level or baseline comfort level  Outcome: Progressing     Problem: Safety - Adult  Goal: Free from fall injury  Outcome: Progressing     Problem: Discharge Planning  Goal: Discharge to home or other facility with appropriate resources  Outcome: Progressing     Problem: Chronic Conditions and Co-morbidities  Goal: Patient's chronic conditions and co-morbidity symptoms are monitored and maintained or improved  Outcome: Progressing     Problem: Diabetes  Goal: Achieve decreasing blood glucose levels by end of shift  Outcome: Progressing  Goal: Increase stability of blood glucose readings by end of shift  Outcome: Progressing  Goal: Decrease in ketones present in urine by end of shift  Outcome: Progressing  Goal: Maintain electrolyte levels within acceptable range throughout shift  Outcome: Progressing  Goal: Maintain glucose levels >70mg/dl to <250mg/dl throughout shift  Outcome: Progressing  Goal: No changes in neurological exam by end of shift  Outcome: Progressing  Goal: Learn about and adhere to nutrition recommendations by end of shift  Outcome: Progressing  Goal: Vital signs within normal range for age by end of shift  Outcome: Progressing  Goal: Increase self care and/or family involovement by end of shift  Outcome: Progressing  Goal: Receive DSME education by end of shift  Outcome: Progressing     Problem: Skin  Goal: Decreased wound size/increased tissue granulation at next dressing change  Outcome: Progressing  Flowsheets (Taken 4/13/2024 0726)  Decreased wound size/increased tissue granulation at next dressing change: Promote sleep for wound healing  Goal: Participates in plan/prevention/treatment measures  Outcome: Progressing  Flowsheets (Taken 4/13/2024 0726)  Participates in plan/prevention/treatment measures:   Discuss with provider PT/OT consult   Elevate heels  Goal: Prevent/manage excess moisture  Outcome: Progressing  Flowsheets  (Taken 4/13/2024 0726)  Prevent/manage excess moisture: Monitor for/manage infection if present  Goal: Prevent/minimize sheer/friction injuries  Outcome: Progressing  Flowsheets (Taken 4/13/2024 0726)  Prevent/minimize sheer/friction injuries:   Turn/reposition every 2 hours/use positioning/transfer devices   Increase activity/out of bed for meals  Goal: Promote/optimize nutrition  Outcome: Progressing  Flowsheets (Taken 4/13/2024 0726)  Promote/optimize nutrition:   Monitor/record intake including meals   Offer water/supplements/favorite foods   Consume > 50% meals/supplements  Goal: Promote skin healing  Outcome: Progressing  Flowsheets (Taken 4/13/2024 0726)  Promote skin healing:   Turn/reposition every 2 hours/use positioning/transfer devices   Assess skin/pad under line(s)/device(s)     The patient's goals for the shift include      The clinical goals for the shift include Remain free from falls and injury during shift    Over the shift, the patient did not make progress toward the following goals.

## 2024-04-13 NOTE — H&P
History Of Present Illness    Recent admissions:  1.  02/08/2024 till 02/11/2024: Acute renal failure superimposed on CKD, acute on chronic deconditioning with recommendations for SNF but patient refused  2.  02/14/2024 till 02/18/2024: Sepsis without shock with concerns for cellulitis to right great toe, acute on chronic diastolic heart failure, CKD 4  3.  02/26/2024 till 03/05/2024: ESTEE requiring hemodialysis, anemia secondary to mild dysplastic syndrome as well as acute bleeding from tunneled dialysis catheter in the right upper chest wall    Tana Hargrove is a 79 y.o.  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  She presented to the ED after awakening on the evening of 04/12/2024 with sudden onset right lower back pain.  Patient stated that she had very minimal discomfort in her abdomen and had nausea as well as an episode of vomiting but did not have severe back pain in the abdomen as reported by the ED.  She states that the pain was more so at the tip of her scapula and radiating somewhat into her right upper quadrant but more so in the back.  She stated that during the day she had had a cheeseburger for dinner without any symptoms.  She denied any recent sick contacts, chemical/environmental exposures, changes in dietary habits or any recent traumatic events/falls other than noted above.  The only abdominal surgeries that she had noted was a hysterectomy and appendectomy in the very distant past.  She denied any fevers, chills, night sweats, vision changes, auditory changes, change in taste/smell, loss of bowel/bladder control, loss consciousness, dizziness, vertigo, syncope,  seizure-like activity, chest pain, palpitations, shortness of breath, coughing, wheezing, congestion, hemoptysis, hematemesis, diarrhea, constipation, dysuria, hematuria, dyschezia, hematochezia or any lateralizing motor/sensory deficits other than noted above.     She unfortunately was set to be traveling to [place] this morning to attend the  of her sister and states that she would like to hopefully be discharged today if possible but understands if she does need to stay.  She had also stated that she already has dialysis set up to be done in [place] while she is down there.    ED course:  1.  Vital signs on presentation: Temperature 37.2 °C, heart rate of 87, respirations of 20, pulse ox of 96% on room air, blood pressure 174/92  2.  Per discussion with the ED physician the patient had severe intractable abdominal pain that was crippling which prompted admission despite interventions with Dilaudid.  3.  WBC of 7.6 with a repeat of 10.5  4.  Hemoglobin is 7.4 with a repeat of 7.7  5.  Glucose of 200 with a repeat of 179  6.  Sodium of 134 with a repeat 135  7.  Potassium of 3.5 with a repeat of 3.6  8.  Creatinine of 1.46 with a repeat of 1.57  9.  LFTs were within range per lab  10.  Total bilirubin of 0.7  11.  BNP of 330  12.  Troponin of 14 with a repeat of 16  13.  VBG: pH is 7.44, pCO2 46, pO2 47, SO2 of 78, calculated bicarb of 31.2  14.  CXR: Noted no acute cardiopulmonary processes  15.  CT chest/abdomen/pelvis without IV contrast: Cholelithiasis with mild gallbladder wall thickening, small patchy opacity in the dependent portion of the right upper lobe which may represent atelectasis versus infiltrate  16.  Ultrasound gallbladder: Cholelithiasis with nonspecific gallbladder wall thickening with a gallbladder that is nondistended, no pericholecystic fluid or sonographic Luna sign, gallbladder wall thickening is favored to be secondary hepatocellular disease, renal cortical atrophy and  hyper echogenicity suggesting medical renal disease, no hydronephrosis, small cortical cysts  17.  Patient received 2 doses of IV Dilaudid in the ED per the discussion with the ED physician.    A 12 point ROS was performed with the patient denying any complaints at this time aside from those listed in the HPI above.    Past Medical History  She has a past medical history of Abnormal levels of other serum enzymes, Acute kidney failure (CMS-HCC), Anemia, CKD (chronic kidney disease), COPD (chronic obstructive pulmonary disease) (Multi), Disease of blood and blood-forming organs, unspecified, HLD (hyperlipidemia), MDS (myelodysplastic syndrome) (Multi), Personal history of other diseases of the musculoskeletal system and connective tissue, Personal history of other specified conditions, Personal history of other specified conditions, and Type 2 diabetes mellitus (Multi).    Surgical History  She has a past surgical history that includes Other surgical history (12/13/2019); Other surgical history (12/13/2019); Other surgical history (Bilateral, 12/13/2019); Other surgical history (Left, 12/13/2019); Other surgical history (12/13/2019); Other surgical history (12/13/2019); Other surgical history (Right, 12/13/2019); Other surgical history (04/16/2021); MR angio head wo IV contrast (11/20/2020); MR angio neck wo IV contrast (11/20/2020); Breast biopsy (Left); Hysterectomy; Breast lumpectomy; Appendectomy; Colonoscopy; and Oophorectomy.     Social History  She reports that she has never smoked. She has never used smokeless tobacco. She reports that she does not currently use alcohol. She reports that she does not use drugs.    Family History  No family history on file.     Allergies  Codeine, Hydrocodone, Hydrocodone-acetaminophen, Meperidine (pf), Tramadol, Piperacillin-tazobactam, Adhesive tape-silicones, and Meperidine    Review of Systems   Constitutional:  Positive for activity change and appetite change. Negative for  chills, diaphoresis, fatigue and unexpected weight change.   HENT:  Negative for congestion, nosebleeds, postnasal drip, rhinorrhea, sinus pressure, sinus pain, sneezing and sore throat.    Respiratory:  Negative for cough, chest tightness, shortness of breath and wheezing.    Cardiovascular:  Negative for chest pain, palpitations and leg swelling.   Gastrointestinal:  Positive for abdominal pain, nausea and vomiting. Negative for abdominal distention, blood in stool, constipation and diarrhea.   Genitourinary:  Negative for decreased urine volume, difficulty urinating, dysuria, flank pain, frequency, hematuria and urgency.   Musculoskeletal:  Negative for arthralgias, back pain, gait problem, joint swelling and myalgias.   Skin:  Negative for wound.   Neurological:  Negative for dizziness, tremors, seizures, syncope, facial asymmetry, speech difficulty, weakness, light-headedness, numbness and headaches.   Psychiatric/Behavioral:  Negative for confusion and decreased concentration. The patient is not nervous/anxious.    All other systems reviewed and are negative.       Physical Exam  Vitals and nursing note reviewed.   Constitutional:       General: She is not in acute distress.     Appearance: Normal appearance. She is not ill-appearing or diaphoretic.   HENT:      Head: Normocephalic and atraumatic.      Mouth/Throat:      Mouth: Mucous membranes are moist.      Pharynx: Oropharynx is clear.   Eyes:      Extraocular Movements: Extraocular movements intact.      Conjunctiva/sclera: Conjunctivae normal.      Pupils: Pupils are equal, round, and reactive to light.   Neck:      Vascular: No carotid bruit.   Cardiovascular:      Rate and Rhythm: Normal rate and regular rhythm.      Pulses: Normal pulses.      Heart sounds: Murmur (Faint murmur heard best at the left lower sternal border) heard.   Pulmonary:      Comments: Diminished breath sounds are all lung fields but otherwise no overtly evident  Abdominal:       General: Bowel sounds are normal. There is no distension.      Palpations: Abdomen is soft. There is no mass.      Tenderness: There is no abdominal tenderness. There is no right CVA tenderness, left CVA tenderness, guarding or rebound.   Musculoskeletal:         General: Normal range of motion.      Cervical back: Normal range of motion and neck supple. No tenderness.      Right lower leg: Edema present.      Left lower leg: Edema present.      Comments: Notable edema to just below the knees which actually seems somewhat improved compared to prior hospitalization.   Skin:     General: Skin is warm and dry.      Capillary Refill: Capillary refill takes less than 2 seconds.      Findings: Lesion present. No bruising, erythema or rash.      Comments: Wounds of the plantar aspect of the left foot is without any cellulitic changes and is somewhat improved given prior hospitalization, wound of right plantar aspect of the foot is without any cellulitic changes and somewhat improved given prior hospitalization.   Neurological:      General: No focal deficit present.      Mental Status: She is alert and oriented to person, place, and time. Mental status is at baseline.      Cranial Nerves: No cranial nerve deficit.      Sensory: No sensory deficit.      Motor: No weakness.      Coordination: Coordination normal.      Gait: Gait normal.      Comments: AOx4, finger-nose/heel-to-shin intact, no grossly evident focal neurosensory deficits, intact sensation to light touch of bilateral upper and lower extremities as well as face, range of motion/strength is globally diminished likely around a 4 out of 5   Psychiatric:         Mood and Affect: Mood normal.         Behavior: Behavior normal.         Thought Content: Thought content normal.         Judgment: Judgment normal.         SCHEDULED MEDICATIONS  amLODIPine, 5 mg, oral, Daily  heparin (porcine), 5,000 Units, subcutaneous, q8h JOSE DE JESUS  insulin lispro, 0-10 Units, subcutaneous,  "With meals & nightly  metoprolol tartrate, 25 mg, oral, Daily  pravastatin, 40 mg, oral, Nightly  pregabalin, 100 mg, oral, BID           CONTINUOUS MEDICATIONS          PRN MEDICATIONS  PRN medications: dextrose, dextrose, glucagon, glucagon, HYDROmorphone, HYDROmorphone      Last Recorded Vitals  Blood pressure 175/69, pulse 91, temperature 37 °C (98.6 °F), temperature source Temporal, resp. rate 18, height 1.549 m (5' 1\"), weight 67.7 kg (149 lb 4 oz), SpO2 98%.    Relevant Results    Results for orders placed or performed during the hospital encounter of 04/13/24 (from the past 24 hour(s))   CBC and Auto Differential   Result Value Ref Range    WBC 7.6 4.4 - 11.3 x10*3/uL    nRBC 0.0 0.0 - 0.0 /100 WBCs    RBC 2.30 (L) 4.00 - 5.20 x10*6/uL    Hemoglobin 7.4 (L) 12.0 - 16.0 g/dL    Hematocrit 22.4 (L) 36.0 - 46.0 %    MCV 97 80 - 100 fL    MCH 32.2 26.0 - 34.0 pg    MCHC 33.0 32.0 - 36.0 g/dL    RDW 23.4 (H) 11.5 - 14.5 %    Platelets 228 150 - 450 x10*3/uL    Neutrophils % 76.0 40.0 - 80.0 %    Immature Granulocytes %, Automated 0.5 0.0 - 0.9 %    Lymphocytes % 13.7 13.0 - 44.0 %    Monocytes % 8.2 2.0 - 10.0 %    Eosinophils % 1.2 0.0 - 6.0 %    Basophils % 0.4 0.0 - 2.0 %    Neutrophils Absolute 5.77 (H) 1.60 - 5.50 x10*3/uL    Immature Granulocytes Absolute, Automated 0.04 0.00 - 0.50 x10*3/uL    Lymphocytes Absolute 1.04 0.80 - 3.00 x10*3/uL    Monocytes Absolute 0.62 0.05 - 0.80 x10*3/uL    Eosinophils Absolute 0.09 0.00 - 0.40 x10*3/uL    Basophils Absolute 0.03 0.00 - 0.10 x10*3/uL   Basic metabolic panel   Result Value Ref Range    Glucose 200 (H) 74 - 99 mg/dL    Sodium 134 (L) 136 - 145 mmol/L    Potassium 3.5 3.5 - 5.3 mmol/L    Chloride 97 (L) 98 - 107 mmol/L    Bicarbonate 31 21 - 32 mmol/L    Anion Gap 10 10 - 20 mmol/L    Urea Nitrogen 16 6 - 23 mg/dL    Creatinine 1.46 (H) 0.50 - 1.05 mg/dL    eGFR 36 (L) >60 mL/min/1.73m*2    Calcium 8.8 8.6 - 10.3 mg/dL   Lipase   Result Value Ref Range    " Lipase 36 9 - 82 U/L   Hepatic function panel   Result Value Ref Range    Albumin 4.0 3.4 - 5.0 g/dL    Bilirubin, Total 0.7 0.0 - 1.2 mg/dL    Bilirubin, Direct 0.1 0.0 - 0.3 mg/dL    Alkaline Phosphatase 70 33 - 136 U/L    ALT 16 7 - 45 U/L    AST 23 9 - 39 U/L    Total Protein 6.4 6.4 - 8.2 g/dL   Lactate   Result Value Ref Range    Lactate 1.1 0.4 - 2.0 mmol/L   B-Type Natriuretic Peptide   Result Value Ref Range     (H) 0 - 99 pg/mL   Blood Gas Venous Full Panel   Result Value Ref Range    POCT pH, Venous 7.44 (H) 7.33 - 7.43 pH    POCT pCO2, Venous 46 41 - 51 mm Hg    POCT pO2, Venous 47 (H) 35 - 45 mm Hg    POCT SO2, Venous 78 (H) 45 - 75 %    POCT Oxy Hemoglobin, Venous 76.2 (H) 45.0 - 75.0 %    POCT Hematocrit Calculated, Venous 21.0 (L) 36.0 - 46.0 %    POCT Sodium, Venous 134 (L) 136 - 145 mmol/L    POCT Potassium, Venous 3.4 (L) 3.5 - 5.3 mmol/L    POCT Chloride, Venous 99 98 - 107 mmol/L    POCT Ionized Calicum, Venous 1.17 1.10 - 1.33 mmol/L    POCT Glucose, Venous 204 (H) 74 - 99 mg/dL    POCT Lactate, Venous 1.1 0.4 - 2.0 mmol/L    POCT Base Excess, Venous 6.4 (H) -2.0 - 3.0 mmol/L    POCT HCO3 Calculated, Venous 31.2 (H) 22.0 - 26.0 mmol/L    POCT Hemoglobin, Venous 7.0 (L) 12.0 - 16.0 g/dL    POCT Anion Gap, Venous 7.0 (L) 10.0 - 25.0 mmol/L    Patient Temperature 37.0 degrees Celsius    FiO2 21 %   Troponin I, High Sensitivity, Initial   Result Value Ref Range    Troponin I, High Sensitivity 14 (H) 0 - 13 ng/L   Morphology   Result Value Ref Range    RBC Morphology See Below     Polychromasia Mild     Hypochromia Mild     Target Cells Few     Ovalocytes Few    Troponin, High Sensitivity, 1 Hour   Result Value Ref Range    Troponin I, High Sensitivity 16 (H) 0 - 13 ng/L   CBC   Result Value Ref Range    WBC 10.5 4.4 - 11.3 x10*3/uL    nRBC 0.0 0.0 - 0.0 /100 WBCs    RBC 2.38 (L) 4.00 - 5.20 x10*6/uL    Hemoglobin 7.7 (L) 12.0 - 16.0 g/dL    Hematocrit 23.4 (L) 36.0 - 46.0 %    MCV 98 80 -  100 fL    MCH 32.4 26.0 - 34.0 pg    MCHC 32.9 32.0 - 36.0 g/dL    RDW 23.7 (H) 11.5 - 14.5 %    Platelets 255 150 - 450 x10*3/uL   Renal function panel   Result Value Ref Range    Glucose 179 (H) 74 - 99 mg/dL    Sodium 135 (L) 136 - 145 mmol/L    Potassium 3.6 3.5 - 5.3 mmol/L    Chloride 97 (L) 98 - 107 mmol/L    Bicarbonate 29 21 - 32 mmol/L    Anion Gap 13 10 - 20 mmol/L    Urea Nitrogen 17 6 - 23 mg/dL    Creatinine 1.57 (H) 0.50 - 1.05 mg/dL    eGFR 33 (L) >60 mL/min/1.73m*2    Calcium 9.3 8.6 - 10.3 mg/dL    Phosphorus 3.8 2.5 - 4.9 mg/dL    Albumin 4.0 3.4 - 5.0 g/dL   Magnesium   Result Value Ref Range    Magnesium 1.77 1.60 - 2.40 mg/dL     *Note: Due to a large number of results and/or encounters for the requested time period, some results have not been displayed. A complete set of results can be found in Results Review.          US gallbladder    Result Date: 4/13/2024  Interpreted By:  Yahir Corrales, STUDY: US GALLBLADDER;  4/13/2024 3:59 am   INDICATION: Signs/Symptoms:abd pain ct concerning for cholecystitis.   COMPARISON: Correlation made to CT chest abdomen and pelvis from 04/13/2024.   ACCESSION NUMBER(S): [id number]   ORDERING CLINICIAN: MORELIA LANTIGUA   TECHNIQUE: Multiple images of the right upper quadrant were obtained.   FINDINGS: LIVER: The liver measures 14.4 cm and is grossly unremarkable and free of any focal lesions.     GALLBLADDER: Cholelithiasis. Nonspecific gallbladder wall thickening measuring up to 5.7 cm in thickness. Gallbladder nondistended. No pericholecystic fluid or sonographic Luna's sign.     BILE DUCTS: No evidence of intra or extrahepatic biliary dilatation is identified; the common bile duct measures 4 mm.   PANCREAS: The visualized pancreas is unremarkable in appearance.   RIGHT KIDNEY: The right kidney measures 8.7 cm in length. Renal cortical atrophy and hyperechogenicity suggesting medical renal disease. Small cortical cysts. No hydronephrosis or renal calculi  are seen.       Cholelithiasis and nonspecific gallbladder wall thickening, with gallbladder nondistended. No pericholecystic fluid or sonographic Luna's sign. Gallbladder wall thickening is favored to be secondary to hepatocellular disease.   Renal cortical atrophy and hyperechogenicity suggesting medical renal disease. No hydronephrosis. Small cortical cysts.     MACRO: None     Signed by: Yahir Corrales 4/13/2024 4:05 AM Dictation workstation:   [id number]    CT chest abdomen pelvis wo IV contrast    Result Date: 4/13/2024  Interpreted By:  Pankaj Flynn, STUDY: CT CHEST ABDOMEN PELVIS WO CONTRAST;  4/13/2024 2:38 am   INDICATION: Signs/Symptoms:sob and pain.   COMPARISON: None.   ACCESSION NUMBER(S): [id number]   ORDERING CLINICIAN: MORELIA LANTIGUA   TECHNIQUE: Contiguous axial images of the chest, abdomen, and pelvis were obtained after without contrast. Coronal and sagittal reformatted images were reconstructed from the axial data.   FINDINGS: Assessment of the vascular, mediastinal, and abdominopelvic structures limited without IV contrast.   CT CHEST:   MEDIASTINUM AND LYMPH NODES: The visualized thyroid is within normal limits. No enlarged intrathoracic or axillary lymph nodes by imaging criteria. No pneumomediastinum. The esophagus appears within normal limits.   HEART: Normal size. Moderate triple-vessel coronary vascular calcifications. Mild calcifications in the plane of the aortic and mitral valves. No significant pericardial effusion. The blood pool is hypodense to the myocardium suggesting anemia.   VESSELS: Vascular patency can not be assessed without IV contrast. The aorta is normal in caliber. Mild enlargement of the main pulmonary artery measuring 3.2 cm which may be seen in the setting of pulmonary arterial hypertension. Moderate calcifications of the aortic arch. A right IJ central venous catheter terminates in the mid right atrium.   LUNG, AIRWAYS, PLEURA: The trachea and proximal mainstem  bronchi are patent. Dependent atelectatic changes bilaterally. Dependent patchy opacity in the right upper lobe. No pleural effusion or pneumothorax.   CHEST WALL SOFT TISSUES: Calcification within the inferior left breast.   OSSEOUS STRUCTURES: No acute osseous abnormality. Moderate degenerative changes of the bilateral shoulders.     CT ABDOMEN/PELVIS:   LIVER: No significant parenchymal abnormality.   BILE DUCTS: No significant intrahepatic or extrahepatic dilatation.   GALLBLADDER: Cholelithiasis and mild gallbladder wall thickening. Sonographic correlation recommended.   PANCREAS: No significant abnormality.   SPLEEN: No significant abnormality.   ADRENALS: No significant abnormality.   KIDNEYS, URETERS, BLADDER: Mild bilateral renal cortical atrophy. Nonspecific perinephric fat stranding bilaterally. No hydronephrosis or hydroureter. Bladder is unremarkable.   REPRODUCTIVE ORGANS: Hysterectomy.   GI: No bowel obstruction. No significant bowel wall thickening or adjacent inflammatory change. Diverticulosis of the sigmoid colon without evidence of acute diverticulitis. The appendix is not visualized.   VESSELS: Moderate calcifications of the aorta and iliac vessels. No aneurysmal dilatation. Vascular patency can not be assessed without IV contrast.   PERITONEUM/RETROPERITONEUM: No ascites, free air, or fluid collection.   LYMPH NODES: No enlarged lymph nodes.   ABDOMINAL WALL: No significant abnormality.   OSSEOUS STRUCTURES: Partially visualized right femoral intramedullary nail with transcervical screw. Posterior spinal fusion hardware spanning from L2-L5. Associated laminectomy changes at L3-L5. Intervertebral body spacers at L4-L5 and L5-S1. No acute osseous abnormality identified.       1. Cholelithiasis and mild gallbladder wall thickening. Sonographic correlation advised. 2. Small patchy opacity in the dependent portion of the right upper lobe which may represent atelectasis or infiltrate. 3. Additional  findings and discussion as above.   MACRO: None.   Signed by: Pankaj Flynn 4/13/2024 3:06 AM Dictation workstation:   [id number]    XR chest 1 view    Result Date: 4/13/2024  Interpreted By:  Yahir Corrales, STUDY: XR CHEST 1 VIEW;  4/13/2024 1:55 am   INDICATION: Signs/Symptoms:cp.   COMPARISON: 03/03/2024.   ACCESSION NUMBER(S): [id number]   ORDERING CLINICIAN: MORELIA LANTIGUA   FINDINGS: AP radiograph of the chest was provided.   Leads overlie the chest, partially obscuring the field-of-view.   Right IJ dual lumen hemodialysis catheter terminates in the right atrium.   CARDIOMEDIASTINAL SILHOUETTE: Cardiomediastinal silhouette is normal in size and configuration.   LUNGS: Similar asymmetric elevation of the right hemidiaphragm. Lungs appear clear. No pleural effusion or pneumothorax.   ABDOMEN: No remarkable upper abdominal findings.   BONES: No acute osseous changes.       1.  No evidence of acute cardiopulmonary process.       MACRO: None   Signed by: Yahir Corrales 4/13/2024 2:16 AM Dictation workstation:   [id number]          Assessment/Plan     Concern for Cholecystitis vs Chronic Changes, Gallbladder Wall Thickening  -General Surgery Consult appreciated  -prn analgesics  -tolerating po intake  -does not appear to be overtly infected or signs of acute cholecystitis  -possibly need for HIDA but will defer further evaluation to General Surgery    ESRD on HD  -Nephrology Consult appreciated  -Dialysis Catheter site appears without signs of bleeding or cellulitic changes    HTN, HLD, CAD, HFpEF  -Continue home therapies   -does not appear overtly volume overloaded on examination and appears somewhat improved from prior     COPD without acute exacerbation   -Continue home therapies      NIDDM-II with hyperglycemia   -SSI ACHS  -Accucheks   -Hypoglycemic protocol   -Monitor and adjust as needed     MDS with chronic anemia requiring Procrit and intermittent PRBC transfusions  -Hgb / anemia near baseline    -Continue to follow while admitted   -transfuse for Hgb <7     Generalized weakness, acute on chronic deconditioning   -Likely 2/2 above   -PT/OT/CM/SW consults appreciated     Ashwin Cruz DO

## 2024-04-13 NOTE — H&P (VIEW-ONLY)
GENERAL SURGERY CONSULTATION NOTE    Tana Hargrove   1945   [id number]     Inpatient consult to Acute Care Surgery  Consult performed by: Mikel Burgess DO  Consult ordered by: Ashwin Cruz DO          Reason For Consult  Abdominal pain    History Of Present Illness  Tana Hargrove is a 79 y.o. female with history of  HTN, HLD, CAD, HFpEF, COPD (no baseline O2), NIDDM2, CKD , chronic anemia requiring Procrit and intermittent PRBC infusions (likely in setting of myelodysplastic disease and CKD), OA, anxiety, depression and GERD.  presenting with sudden onset right mid back pain. She states she had a hamburger at 4 pm then woke up around 10 pm with sudden intense right back pain below her shoulder. She reports having some nausea at the time too but no emesis. The nausea resolved quickly but the pain persisted. She denies any prior history of gallbladder disease, no fevers, chills, diarrhea, shortness of breath, or changes in appetite. No other acute concerns     Past Medical History  Past Medical History:   Diagnosis Date    Abnormal levels of other serum enzymes     Elevated liver enzymes    Acute kidney failure (CMS-HCC)     Anemia     CKD (chronic kidney disease)     COPD (chronic obstructive pulmonary disease) (Multi)     Disease of blood and blood-forming organs, unspecified     Bone marrow disorder    HLD (hyperlipidemia)     MDS (myelodysplastic syndrome) (Multi)     Personal history of other diseases of the musculoskeletal system and connective tissue     History of muscle pain    Personal history of other specified conditions     History of insomnia    Personal history of other specified conditions     History of edema    Type 2 diabetes mellitus (Multi)        Surgical History  Past Surgical History:   Procedure Laterality Date    APPENDECTOMY      BREAST BIOPSY Left     left excisional    BREAST LUMPECTOMY      COLONOSCOPY      HYSTERECTOMY      MR HEAD ANGIO WO IV CONTRAST  11/20/2020    MR HEAD  ANGIO WO IV CONTRAST 11/20/2020 [facility]    MR NECK ANGIO WO IV CONTRAST  11/20/2020    MR NECK ANGIO WO IV CONTRAST 11/20/2020 [facility] CLINICAL LEGAC[facility]    OOPHORECTOMY      OTHER SURGICAL HISTORY  12/13/2019    Oophorectomy bilateral    OTHER SURGICAL HISTORY  12/13/2019    Tubal ligation    OTHER SURGICAL HISTORY Bilateral 12/13/2019    Knee replacement    OTHER SURGICAL HISTORY Left 12/13/2019    Shoulder surgery    OTHER SURGICAL HISTORY  12/13/2019    Hysterectomy    OTHER SURGICAL HISTORY  12/13/2019    Lumpectomy    OTHER SURGICAL HISTORY Right 12/13/2019    Foot surgery    OTHER SURGICAL HISTORY  04/16/2021    Back surgery       Medications  Current Facility-Administered Medications on File Prior to Encounter   Medication Dose Route Frequency Provider Last Rate Last Admin    albuterol 2.5 mg /3 mL (0.083 %) nebulizer solution 3 mL  3 mL nebulization PRN Rosanne M Casal, APRN-CNP, DNP        dextrose 5 % in water (D5W) bolus  500 mL intravenous PRN Rosanne M Casal, APRN-CNP, DNP        diphenhydrAMINE (BENADryl) injection 50 mg  50 mg intravenous PRN Rosanne M Casal, APRN-CNP, DNP        EPINEPHrine (Epipen) injection syringe 0.3 mg  0.3 mg intramuscular q5 min PRN Rosanne M Casal, APRN-CNP, DNP        famotidine PF (Pepcid) injection 20 mg  20 mg intravenous Once PRN Rosanne M Casal, APRN-CNP, DNP        methylPREDNISolone sod suc / (SOLU-Medrol) 40 mg injection 40 mg  40 mg intravenous PRN Rosanne M Casal, APRN-CNP, DNP        sodium chloride 0.9 % bolus 500 mL  500 mL intravenous PRN Rosanne M Casal, APRN-CNP, DNP         Current Outpatient Medications on File Prior to Encounter   Medication Sig Dispense Refill    allopurinol (Zyloprim) 100 mg tablet Take 1 tablet (100 mg) by mouth once daily. (Patient taking differently: Take 1 tablet (100 mg) by mouth 2 times a day.) 90 tablet 1    amLODIPine (Norvasc) 5 mg tablet Take 1 tablet (5 mg) by mouth once daily.      cholecalciferol (Vitamin D-3) 50 MCG  (2000 UT) tablet Take 1 tablet (2,000 Units) by mouth once daily.      cyanocobalamin (Vitamin B-12) 1,000 mcg tablet Take 1 tablet (1,000 mcg) by mouth once daily.      epoetin alejandra (Procrit) 10,000 unit/mL injection Pt gets every Monday.      metoprolol tartrate (Lopressor) 25 mg tablet Take 1 tablet (25 mg) by mouth once daily. 30 tablet 11    pravastatin (Pravachol) 40 mg tablet TAKE 1 TABLET BY MOUTH ONCE DAILY AT BEDTIME 90 tablet 0    pregabalin (Lyrica) 100 mg capsule TAKE ONE CAPSULE BY MOUTH TWICE DAILY @9AM & 5PM 6 capsule 0    sevelamer carbonate (Renvela) 800 mg tablet Take 1 tablet (800 mg) by mouth 3 times a day with meals. Swallow tablet whole; do not crush, break, or chew. 30 tablet 1    SITagliptin phosphate (Januvia) 25 mg tablet Take 1 tablet (25 mg) by mouth once daily. Do not start before March 6, 2024. 30 tablet 1    ondansetron ODT (Zofran-ODT) 4 mg disintegrating tablet Take 2 tablets (8 mg) by mouth every 8 hours if needed for nausea or vomiting. 20 tablet 0    oxygen (O2) gas therapy Inhale 3 L/min once every 24 hours.      pioglitazone (Actos) 15 mg tablet TAKE 1 TABLET BY MOUTH ONCE DAILY 90 tablet 0    sennosides-docusate sodium (Ana-Colace) 8.6-50 mg tablet Take 2 tablets by mouth 2 times a day as needed for constipation. 30 tablet 0       Allergies  Codeine, Hydrocodone, Hydrocodone-acetaminophen, Meperidine (pf), Tramadol, Piperacillin-tazobactam, Adhesive tape-silicones, and Meperidine     Social History  She reports that she has never smoked. She has never used smokeless tobacco. She reports that she does not currently use alcohol. She reports that she does not use drugs.    Family History  No family history on file.     Review of Systems   Constitutional:  Negative for appetite change, chills, fatigue and fever.   Respiratory:  Negative for cough, chest tightness and shortness of breath.    Cardiovascular:  Negative for chest pain and palpitations.   Gastrointestinal:  Positive  "for nausea. Negative for abdominal distention, abdominal pain, constipation, diarrhea and vomiting.   Genitourinary:  Negative for difficulty urinating, flank pain and frequency.   Musculoskeletal:  Positive for back pain.   Neurological:  Negative for dizziness, light-headedness and headaches.       Last Recorded Vitals  Blood pressure 175/69, pulse 91, temperature 37 °C (98.6 °F), temperature source Temporal, resp. rate 18, height 1.549 m (5' 1\"), weight 67.7 kg (149 lb 4 oz), SpO2 98%.     Physical Exam  Vitals reviewed.   Constitutional:       General: She is not in acute distress.     Appearance: Normal appearance. She is not ill-appearing.   HENT:      Head: Normocephalic and atraumatic.      Mouth/Throat:      Mouth: Mucous membranes are moist.   Eyes:      General: No scleral icterus.     Conjunctiva/sclera: Conjunctivae normal.      Pupils: Pupils are equal, round, and reactive to light.   Cardiovascular:      Rate and Rhythm: Normal rate.      Pulses: Normal pulses.   Pulmonary:      Effort: Pulmonary effort is normal. No respiratory distress.   Chest:      Chest wall: No tenderness.   Abdominal:      General: There is no distension.      Palpations: Abdomen is soft.      Tenderness: There is no abdominal tenderness. There is no guarding or rebound.      Hernia: No hernia is present.   Musculoskeletal:         General: No swelling or signs of injury.   Skin:     General: Skin is warm and dry.   Neurological:      General: No focal deficit present.      Mental Status: She is alert and oriented to person, place, and time.          Relevant Results:  Labs:  Results for orders placed or performed during the hospital encounter of 04/13/24 (from the past 24 hour(s))   CBC and Auto Differential   Result Value Ref Range    WBC 7.6 4.4 - 11.3 x10*3/uL    nRBC 0.0 0.0 - 0.0 /100 WBCs    RBC 2.30 (L) 4.00 - 5.20 x10*6/uL    Hemoglobin 7.4 (L) 12.0 - 16.0 g/dL    Hematocrit 22.4 (L) 36.0 - 46.0 %    MCV 97 80 - 100 fL "    MCH 32.2 26.0 - 34.0 pg    MCHC 33.0 32.0 - 36.0 g/dL    RDW 23.4 (H) 11.5 - 14.5 %    Platelets 228 150 - 450 x10*3/uL    Neutrophils % 76.0 40.0 - 80.0 %    Immature Granulocytes %, Automated 0.5 0.0 - 0.9 %    Lymphocytes % 13.7 13.0 - 44.0 %    Monocytes % 8.2 2.0 - 10.0 %    Eosinophils % 1.2 0.0 - 6.0 %    Basophils % 0.4 0.0 - 2.0 %    Neutrophils Absolute 5.77 (H) 1.60 - 5.50 x10*3/uL    Immature Granulocytes Absolute, Automated 0.04 0.00 - 0.50 x10*3/uL    Lymphocytes Absolute 1.04 0.80 - 3.00 x10*3/uL    Monocytes Absolute 0.62 0.05 - 0.80 x10*3/uL    Eosinophils Absolute 0.09 0.00 - 0.40 x10*3/uL    Basophils Absolute 0.03 0.00 - 0.10 x10*3/uL   Basic metabolic panel   Result Value Ref Range    Glucose 200 (H) 74 - 99 mg/dL    Sodium 134 (L) 136 - 145 mmol/L    Potassium 3.5 3.5 - 5.3 mmol/L    Chloride 97 (L) 98 - 107 mmol/L    Bicarbonate 31 21 - 32 mmol/L    Anion Gap 10 10 - 20 mmol/L    Urea Nitrogen 16 6 - 23 mg/dL    Creatinine 1.46 (H) 0.50 - 1.05 mg/dL    eGFR 36 (L) >60 mL/min/1.73m*2    Calcium 8.8 8.6 - 10.3 mg/dL   Lipase   Result Value Ref Range    Lipase 36 9 - 82 U/L   Hepatic function panel   Result Value Ref Range    Albumin 4.0 3.4 - 5.0 g/dL    Bilirubin, Total 0.7 0.0 - 1.2 mg/dL    Bilirubin, Direct 0.1 0.0 - 0.3 mg/dL    Alkaline Phosphatase 70 33 - 136 U/L    ALT 16 7 - 45 U/L    AST 23 9 - 39 U/L    Total Protein 6.4 6.4 - 8.2 g/dL   Lactate   Result Value Ref Range    Lactate 1.1 0.4 - 2.0 mmol/L   B-Type Natriuretic Peptide   Result Value Ref Range     (H) 0 - 99 pg/mL   Blood Gas Venous Full Panel   Result Value Ref Range    POCT pH, Venous 7.44 (H) 7.33 - 7.43 pH    POCT pCO2, Venous 46 41 - 51 mm Hg    POCT pO2, Venous 47 (H) 35 - 45 mm Hg    POCT SO2, Venous 78 (H) 45 - 75 %    POCT Oxy Hemoglobin, Venous 76.2 (H) 45.0 - 75.0 %    POCT Hematocrit Calculated, Venous 21.0 (L) 36.0 - 46.0 %    POCT Sodium, Venous 134 (L) 136 - 145 mmol/L    POCT Potassium, Venous 3.4  (L) 3.5 - 5.3 mmol/L    POCT Chloride, Venous 99 98 - 107 mmol/L    POCT Ionized Calicum, Venous 1.17 1.10 - 1.33 mmol/L    POCT Glucose, Venous 204 (H) 74 - 99 mg/dL    POCT Lactate, Venous 1.1 0.4 - 2.0 mmol/L    POCT Base Excess, Venous 6.4 (H) -2.0 - 3.0 mmol/L    POCT HCO3 Calculated, Venous 31.2 (H) 22.0 - 26.0 mmol/L    POCT Hemoglobin, Venous 7.0 (L) 12.0 - 16.0 g/dL    POCT Anion Gap, Venous 7.0 (L) 10.0 - 25.0 mmol/L    Patient Temperature 37.0 degrees Celsius    FiO2 21 %   Troponin I, High Sensitivity, Initial   Result Value Ref Range    Troponin I, High Sensitivity 14 (H) 0 - 13 ng/L   Morphology   Result Value Ref Range    RBC Morphology See Below     Polychromasia Mild     Hypochromia Mild     Target Cells Few     Ovalocytes Few    Troponin, High Sensitivity, 1 Hour   Result Value Ref Range    Troponin I, High Sensitivity 16 (H) 0 - 13 ng/L   CBC   Result Value Ref Range    WBC 10.5 4.4 - 11.3 x10*3/uL    nRBC 0.0 0.0 - 0.0 /100 WBCs    RBC 2.38 (L) 4.00 - 5.20 x10*6/uL    Hemoglobin 7.7 (L) 12.0 - 16.0 g/dL    Hematocrit 23.4 (L) 36.0 - 46.0 %    MCV 98 80 - 100 fL    MCH 32.4 26.0 - 34.0 pg    MCHC 32.9 32.0 - 36.0 g/dL    RDW 23.7 (H) 11.5 - 14.5 %    Platelets 255 150 - 450 x10*3/uL   Renal function panel   Result Value Ref Range    Glucose 179 (H) 74 - 99 mg/dL    Sodium 135 (L) 136 - 145 mmol/L    Potassium 3.6 3.5 - 5.3 mmol/L    Chloride 97 (L) 98 - 107 mmol/L    Bicarbonate 29 21 - 32 mmol/L    Anion Gap 13 10 - 20 mmol/L    Urea Nitrogen 17 6 - 23 mg/dL    Creatinine 1.57 (H) 0.50 - 1.05 mg/dL    eGFR 33 (L) >60 mL/min/1.73m*2    Calcium 9.3 8.6 - 10.3 mg/dL    Phosphorus 3.8 2.5 - 4.9 mg/dL    Albumin 4.0 3.4 - 5.0 g/dL   Magnesium   Result Value Ref Range    Magnesium 1.77 1.60 - 2.40 mg/dL   POCT GLUCOSE   Result Value Ref Range    POCT Glucose 168 (H) 74 - 99 mg/dL     *Note: Due to a large number of results and/or encounters for the requested time period, some results have not been  displayed. A complete set of results can be found in Results Review.       Imaging:  US gallbladder  Narrative: Interpreted By:  Yahir Corrales,   STUDY:  US GALLBLADDER;  4/13/2024 3:59 am      INDICATION:  Signs/Symptoms:abd pain ct concerning for cholecystitis.      COMPARISON:  Correlation made to CT chest abdomen and pelvis from 04/13/2024.      ACCESSION NUMBER(S):  [id number]      ORDERING CLINICIAN:  MORELIA LANTIGUA      TECHNIQUE:  Multiple images of the right upper quadrant were obtained.      FINDINGS:  LIVER:  The liver measures 14.4 cm and is grossly unremarkable and free of  any focal lesions.          GALLBLADDER:  Cholelithiasis. Nonspecific gallbladder wall thickening measuring up  to 5.7 cm in thickness. Gallbladder nondistended. No pericholecystic  fluid or sonographic Luna's sign.          BILE DUCTS:  No evidence of intra or extrahepatic biliary dilatation is  identified; the common bile duct measures 4 mm.      PANCREAS:  The visualized pancreas is unremarkable in appearance.      RIGHT KIDNEY:  The right kidney measures 8.7 cm in length. Renal cortical atrophy  and hyperechogenicity suggesting medical renal disease. Small  cortical cysts. No hydronephrosis or renal calculi are seen.      Impression: Cholelithiasis and nonspecific gallbladder wall thickening, with  gallbladder nondistended. No pericholecystic fluid or sonographic  Luna's sign. Gallbladder wall thickening is favored to be secondary  to hepatocellular disease.      Renal cortical atrophy and hyperechogenicity suggesting medical renal  disease. No hydronephrosis. Small cortical cysts.          MACRO:  None          Signed by: Yahir Corrales 4/13/2024 4:05 AM  Dictation workstation:   [id number]  CT chest abdomen pelvis wo IV contrast  Narrative: Interpreted By:  Pankaj Flynn,   STUDY:  CT CHEST ABDOMEN PELVIS WO CONTRAST;  4/13/2024 2:38 am      INDICATION:  Signs/Symptoms:sob and pain.      COMPARISON:  None.      ACCESSION  NUMBER(S):  [id number]      ORDERING CLINICIAN:  MORELIA LANTIGUA      TECHNIQUE:  Contiguous axial images of the chest, abdomen, and pelvis were  obtained after without contrast. Coronal and sagittal reformatted  images were reconstructed from the axial data.      FINDINGS:  Assessment of the vascular, mediastinal, and abdominopelvic  structures limited without IV contrast.      CT CHEST:      MEDIASTINUM AND LYMPH NODES: The visualized thyroid is within normal  limits. No enlarged intrathoracic or axillary lymph nodes by imaging  criteria. No pneumomediastinum. The esophagus appears within normal  limits.      HEART: Normal size. Moderate triple-vessel coronary vascular  calcifications. Mild calcifications in the plane of the aortic and  mitral valves. No significant pericardial effusion. The blood pool is  hypodense to the myocardium suggesting anemia.      VESSELS: Vascular patency can not be assessed without IV contrast.  The aorta is normal in caliber. Mild enlargement of the main  pulmonary artery measuring 3.2 cm which may be seen in the setting of  pulmonary arterial hypertension. Moderate calcifications of the  aortic arch. A right IJ central venous catheter terminates in the mid  right atrium.      LUNG, AIRWAYS, PLEURA: The trachea and proximal mainstem bronchi are  patent. Dependent atelectatic changes bilaterally. Dependent patchy  opacity in the right upper lobe. No pleural effusion or pneumothorax.      CHEST WALL SOFT TISSUES: Calcification within the inferior left  breast.      OSSEOUS STRUCTURES: No acute osseous abnormality. Moderate  degenerative changes of the bilateral shoulders.          CT ABDOMEN/PELVIS:      LIVER: No significant parenchymal abnormality.      BILE DUCTS: No significant intrahepatic or extrahepatic dilatation.      GALLBLADDER: Cholelithiasis and mild gallbladder wall thickening.  Sonographic correlation recommended.      PANCREAS: No significant abnormality.      SPLEEN:  No significant abnormality.      ADRENALS: No significant abnormality.      KIDNEYS, URETERS, BLADDER: Mild bilateral renal cortical atrophy.  Nonspecific perinephric fat stranding bilaterally. No hydronephrosis  or hydroureter. Bladder is unremarkable.      REPRODUCTIVE ORGANS: Hysterectomy.      GI: No bowel obstruction. No significant bowel wall thickening or  adjacent inflammatory change. Diverticulosis of the sigmoid colon  without evidence of acute diverticulitis. The appendix is not  visualized.      VESSELS: Moderate calcifications of the aorta and iliac vessels. No  aneurysmal dilatation. Vascular patency can not be assessed without  IV contrast.      PERITONEUM/RETROPERITONEUM: No ascites, free air, or fluid collection.      LYMPH NODES: No enlarged lymph nodes.      ABDOMINAL WALL: No significant abnormality.      OSSEOUS STRUCTURES: Partially visualized right femoral intramedullary  nail with transcervical screw. Posterior spinal fusion hardware  spanning from L2-L5. Associated laminectomy changes at L3-L5.  Intervertebral body spacers at L4-L5 and L5-S1. No acute osseous  abnormality identified.      Impression: 1. Cholelithiasis and mild gallbladder wall thickening. Sonographic  correlation advised.  2. Small patchy opacity in the dependent portion of the right upper  lobe which may represent atelectasis or infiltrate.  3. Additional findings and discussion as above.      MACRO:  None.      Signed by: Pankaj Flynn 4/13/2024 3:06 AM  Dictation workstation:   [id number]  XR chest 1 view  Narrative: Interpreted By:  Yahir Corrales,   STUDY:  XR CHEST 1 VIEW;  4/13/2024 1:55 am      INDICATION:  Signs/Symptoms:cp.      COMPARISON:  03/03/2024.      ACCESSION NUMBER(S):  [id number]      ORDERING CLINICIAN:  MORELIA LANTIGUA      FINDINGS:  AP radiograph of the chest was provided.      Leads overlie the chest, partially obscuring the field-of-view.      Right IJ dual lumen hemodialysis catheter terminates  in the right  atrium.      CARDIOMEDIASTINAL SILHOUETTE:  Cardiomediastinal silhouette is normal in size and configuration.      LUNGS:  Similar asymmetric elevation of the right hemidiaphragm. Lungs appear  clear. No pleural effusion or pneumothorax.      ABDOMEN:  No remarkable upper abdominal findings.      BONES:  No acute osseous changes.      Impression: 1.  No evidence of acute cardiopulmonary process.              MACRO:  None      Signed by: Yahir Corrales 4/13/2024 2:16 AM  Dictation workstation:   [id number]      Assessment and Plan  Principal Problem:    Cholecystitis    79 y.o. female with sudden unset right back/subscapular pain and gallbladder wall thickening seen on imaging  - Abdominal exam benign and patient reports not abdominal pain  - Imaging showed mild gallbladder wall thickening but no duct dilation, pericholecystic fluid or Luna's sign  - LFTs and leukocytes wnl.  - No acute surgical interventions at this time, if symptoms return and or include, nausea, vomiting, appetite change, fevers, chills, or abdominal pain then return to the ED for evaluation  - General Surgery will sign off    Discussed with attending Dr. Sydney Burgess, DO - PGY2  General Surgery

## 2024-04-14 ENCOUNTER — APPOINTMENT (OUTPATIENT)
Dept: CARDIOLOGY | Facility: HOSPITAL | Age: 79
DRG: 444 | End: 2024-04-14
Payer: MEDICARE

## 2024-04-14 LAB
AMORPH CRY #/AREA UR COMP ASSIST: ABNORMAL /HPF
APPEARANCE UR: ABNORMAL
BACTERIA #/AREA URNS AUTO: ABNORMAL /HPF
BILIRUB UR STRIP.AUTO-MCNC: NEGATIVE MG/DL
COLOR UR: YELLOW
GLUCOSE BLD MANUAL STRIP-MCNC: 117 MG/DL (ref 74–99)
GLUCOSE BLD MANUAL STRIP-MCNC: 182 MG/DL (ref 74–99)
GLUCOSE BLD MANUAL STRIP-MCNC: 229 MG/DL (ref 74–99)
GLUCOSE BLD MANUAL STRIP-MCNC: 95 MG/DL (ref 74–99)
GLUCOSE UR STRIP.AUTO-MCNC: NEGATIVE MG/DL
KETONES UR STRIP.AUTO-MCNC: NEGATIVE MG/DL
LEUKOCYTE ESTERASE UR QL STRIP.AUTO: ABNORMAL
MUCOUS THREADS #/AREA URNS AUTO: ABNORMAL /LPF
NITRITE UR QL STRIP.AUTO: NEGATIVE
PH UR STRIP.AUTO: 5 [PH]
PROT UR STRIP.AUTO-MCNC: ABNORMAL MG/DL
RBC # UR STRIP.AUTO: NEGATIVE /UL
RBC #/AREA URNS AUTO: ABNORMAL /HPF
RENAL EPI CELLS #/AREA UR COMP ASSIST: ABNORMAL /HPF
SP GR UR STRIP.AUTO: 1.01
SQUAMOUS #/AREA URNS AUTO: ABNORMAL /HPF
UROBILINOGEN UR STRIP.AUTO-MCNC: 2 MG/DL
WBC #/AREA URNS AUTO: >50 /HPF
WBC CLUMPS #/AREA URNS AUTO: ABNORMAL /HPF

## 2024-04-14 PROCEDURE — 2500000001 HC RX 250 WO HCPCS SELF ADMINISTERED DRUGS (ALT 637 FOR MEDICARE OP): Performed by: INTERNAL MEDICINE

## 2024-04-14 PROCEDURE — 96365 THER/PROPH/DIAG IV INF INIT: CPT

## 2024-04-14 PROCEDURE — 87086 URINE CULTURE/COLONY COUNT: CPT | Mod: PORLAB | Performed by: EMERGENCY MEDICINE

## 2024-04-14 PROCEDURE — 99232 SBSQ HOSP IP/OBS MODERATE 35: CPT | Performed by: INTERNAL MEDICINE

## 2024-04-14 PROCEDURE — 93005 ELECTROCARDIOGRAM TRACING: CPT

## 2024-04-14 PROCEDURE — 1200000002 HC GENERAL ROOM WITH TELEMETRY DAILY

## 2024-04-14 PROCEDURE — 82947 ASSAY GLUCOSE BLOOD QUANT: CPT

## 2024-04-14 PROCEDURE — 2500000004 HC RX 250 GENERAL PHARMACY W/ HCPCS (ALT 636 FOR OP/ED): Performed by: INTERNAL MEDICINE

## 2024-04-14 PROCEDURE — 93010 ELECTROCARDIOGRAM REPORT: CPT | Performed by: INTERNAL MEDICINE

## 2024-04-14 PROCEDURE — G0378 HOSPITAL OBSERVATION PER HR: HCPCS

## 2024-04-14 PROCEDURE — 81003 URINALYSIS AUTO W/O SCOPE: CPT | Performed by: EMERGENCY MEDICINE

## 2024-04-14 PROCEDURE — 2500000002 HC RX 250 W HCPCS SELF ADMINISTERED DRUGS (ALT 637 FOR MEDICARE OP, ALT 636 FOR OP/ED): Performed by: INTERNAL MEDICINE

## 2024-04-14 RX ORDER — HEPARIN SODIUM 1000 [USP'U]/ML
2000 INJECTION, SOLUTION INTRAVENOUS; SUBCUTANEOUS
Status: CANCELLED | OUTPATIENT
Start: 2024-04-15

## 2024-04-14 RX ORDER — DOXYCYCLINE 100 MG/1
100 CAPSULE ORAL EVERY 12 HOURS SCHEDULED
Status: DISCONTINUED | OUTPATIENT
Start: 2024-04-14 | End: 2024-04-15 | Stop reason: HOSPADM

## 2024-04-14 RX ORDER — CEFTRIAXONE 1 G/50ML
1 INJECTION, SOLUTION INTRAVENOUS EVERY 24 HOURS
Status: DISCONTINUED | OUTPATIENT
Start: 2024-04-14 | End: 2024-04-15 | Stop reason: HOSPADM

## 2024-04-14 RX ADMIN — PREGABALIN 100 MG: 50 CAPSULE ORAL at 08:42

## 2024-04-14 RX ADMIN — AMLODIPINE BESYLATE 5 MG: 5 TABLET ORAL at 08:42

## 2024-04-14 RX ADMIN — CEFTRIAXONE SODIUM 1 G: 1 INJECTION, SOLUTION INTRAVENOUS at 14:59

## 2024-04-14 RX ADMIN — HEPARIN SODIUM 5000 UNITS: 5000 INJECTION INTRAVENOUS; SUBCUTANEOUS at 21:09

## 2024-04-14 RX ADMIN — DOXYCYCLINE 100 MG: 100 CAPSULE ORAL at 21:09

## 2024-04-14 RX ADMIN — METOPROLOL TARTRATE 25 MG: 25 TABLET, FILM COATED ORAL at 08:42

## 2024-04-14 RX ADMIN — INSULIN LISPRO 4 UNITS: 100 INJECTION, SOLUTION INTRAVENOUS; SUBCUTANEOUS at 23:03

## 2024-04-14 RX ADMIN — HEPARIN SODIUM 5000 UNITS: 5000 INJECTION INTRAVENOUS; SUBCUTANEOUS at 14:59

## 2024-04-14 RX ADMIN — PREGABALIN 100 MG: 50 CAPSULE ORAL at 21:09

## 2024-04-14 RX ADMIN — PRAVASTATIN SODIUM 40 MG: 40 TABLET ORAL at 21:09

## 2024-04-14 RX ADMIN — HEPARIN SODIUM 5000 UNITS: 5000 INJECTION INTRAVENOUS; SUBCUTANEOUS at 05:53

## 2024-04-14 RX ADMIN — INSULIN LISPRO 2 UNITS: 100 INJECTION, SOLUTION INTRAVENOUS; SUBCUTANEOUS at 11:35

## 2024-04-14 RX ADMIN — DOXYCYCLINE 100 MG: 100 CAPSULE ORAL at 14:59

## 2024-04-14 ASSESSMENT — COGNITIVE AND FUNCTIONAL STATUS - GENERAL
MOBILITY SCORE: 23
DAILY ACTIVITIY SCORE: 24
MOBILITY SCORE: 23
CLIMB 3 TO 5 STEPS WITH RAILING: A LITTLE
DAILY ACTIVITIY SCORE: 24
MOBILITY SCORE: 23
DAILY ACTIVITIY SCORE: 24

## 2024-04-14 ASSESSMENT — PAIN SCALES - GENERAL
PAINLEVEL_OUTOF10: 0 - NO PAIN
PAINLEVEL_OUTOF10: 0 - NO PAIN

## 2024-04-14 ASSESSMENT — PAIN - FUNCTIONAL ASSESSMENT: PAIN_FUNCTIONAL_ASSESSMENT: 0-10

## 2024-04-14 NOTE — PROGRESS NOTES
"      Nephrology Progress Note      Nephrology following for ESRD.   Has cough and fever last night, was 91% on room air at rest, down to 88% with room air on exertion  She says she is being treated for pneumonia now sitting in chair and oxygen is off    Daughter at bedside      /61 (BP Location: Left arm, Patient Position: Sitting)   Pulse 66   Temp 36.8 °C (98.3 °F) (Temporal)   Resp 19   Ht 1.549 m (5' 1\")   Wt 67.7 kg (149 lb 4 oz)   SpO2 (!) 87% Comment: pt refused O2 while in chair  BMI 28.20 kg/m²     Input / Output:  24 HR:   Intake/Output Summary (Last 24 hours) at 4/14/2024 1030  Last data filed at 4/14/2024 0953  Gross per 24 hour   Intake 520 ml   Output --   Net 520 ml       Physical Exam   Alert and oriented x 3 NAD  Neck: no JVD  CV: RRR  Lungs: Rhonchi right lower lobe  Abd: soft, NT, ND   Ext: No lower extremity edema    Scheduled medications  amLODIPine, 5 mg, oral, Daily  heparin (porcine), 5,000 Units, subcutaneous, q8h JOSE DE JESUS  insulin lispro, 0-10 Units, subcutaneous, With meals & nightly  metoprolol tartrate, 25 mg, oral, Daily  pravastatin, 40 mg, oral, Nightly  pregabalin, 100 mg, oral, BID      Continuous medications     PRN medications  PRN medications: dextrose, dextrose, glucagon, glucagon, HYDROmorphone, HYDROmorphone   Results from last 7 days   Lab Units 04/13/24  0553 04/13/24  0133   SODIUM mmol/L 135* 134*   POTASSIUM mmol/L 3.6 3.5   CHLORIDE mmol/L 97* 97*   CO2 mmol/L 29 31   BUN mg/dL 17 16   CREATININE mg/dL 1.57* 1.46*   CALCIUM mg/dL 9.3 8.8   PROTEIN TOTAL g/dL  --  6.4   BILIRUBIN TOTAL mg/dL  --  0.7   ALK PHOS U/L  --  70   ALT U/L  --  16   AST U/L  --  23   GLUCOSE mg/dL 179* 200*      Results from last 7 days   Lab Units 04/13/24  0553   MAGNESIUM mg/dL 1.77      Results from last 7 days   Lab Units 04/13/24  0553 04/13/24  0133   WBC AUTO x10*3/uL 10.5 7.6   HEMOGLOBIN g/dL 7.7* 7.4*   HEMATOCRIT % 23.4* 22.4*   PLATELETS AUTO x10*3/uL 255 228    "     Assessment & Plan:     Patient is 79 y.o. female with diabetes, hypertension who is admitted to hospital for abdominal pain. Nephrology consulted in view of ESRD.        ESRD  -Hemodialysis Monday Wednesday Friday at [facility]  -Access is TDC  Had dialysis yesterday     Volume  Stable     Abdominal pain  -Has cholelithiasis concern for cholecystitis, no surgical intervention planned.     Anemia of ESRD and myelodysplastic syndrome  -Continue to titrate up MARILYNN with outpatient dialysis     Recommendations:     -No need for dialysis today  -HD tomorrow  -Will keep on Monday Wednesday Friday schedule while here.    Please message me through [employer] chat with any questions or concerns.     Pinky Christie DO  4/14/2024  10:30 AM     [facility]    [address], [address]   [place] [address]  Office: [phone]

## 2024-04-14 NOTE — CARE PLAN
Problem: Pain - Adult  Goal: Verbalizes/displays adequate comfort level or baseline comfort level  Outcome: Progressing     Problem: Safety - Adult  Goal: Free from fall injury  Outcome: Progressing     Problem: Discharge Planning  Goal: Discharge to home or other facility with appropriate resources  Outcome: Progressing     Problem: Chronic Conditions and Co-morbidities  Goal: Patient's chronic conditions and co-morbidity symptoms are monitored and maintained or improved  Outcome: Progressing     Problem: Diabetes  Goal: Achieve decreasing blood glucose levels by end of shift  Outcome: Progressing  Goal: Increase stability of blood glucose readings by end of shift  Outcome: Progressing  Goal: Decrease in ketones present in urine by end of shift  Outcome: Progressing  Goal: Maintain electrolyte levels within acceptable range throughout shift  Outcome: Progressing  Goal: Maintain glucose levels >70mg/dl to <250mg/dl throughout shift  Outcome: Progressing  Goal: No changes in neurological exam by end of shift  Outcome: Progressing  Goal: Learn about and adhere to nutrition recommendations by end of shift  Outcome: Progressing  Goal: Vital signs within normal range for age by end of shift  Outcome: Progressing  Goal: Increase self care and/or family involovement by end of shift  Outcome: Progressing  Goal: Receive DSME education by end of shift  Outcome: Progressing     Problem: Skin  Goal: Decreased wound size/increased tissue granulation at next dressing change  Outcome: Progressing  Flowsheets (Taken 4/14/2024 0715)  Decreased wound size/increased tissue granulation at next dressing change: Promote sleep for wound healing  Goal: Participates in plan/prevention/treatment measures  Outcome: Progressing  Flowsheets (Taken 4/14/2024 0715)  Participates in plan/prevention/treatment measures:   Discuss with provider PT/OT consult   Elevate heels  Goal: Prevent/manage excess moisture  Outcome: Progressing  Flowsheets  (Taken 4/14/2024 0715)  Prevent/manage excess moisture: Monitor for/manage infection if present  Goal: Prevent/minimize sheer/friction injuries  Outcome: Progressing  Flowsheets (Taken 4/14/2024 0715)  Prevent/minimize sheer/friction injuries: Turn/reposition every 2 hours/use positioning/transfer devices  Goal: Promote/optimize nutrition  Outcome: Progressing  Flowsheets (Taken 4/14/2024 0715)  Promote/optimize nutrition:   Monitor/record intake including meals   Offer water/supplements/favorite foods   Consume > 50% meals/supplements  Goal: Promote skin healing  Outcome: Progressing  Flowsheets (Taken 4/14/2024 0715)  Promote skin healing: Turn/reposition every 2 hours/use positioning/transfer devices     The patient's goals for the shift include      The clinical goals for the shift include Remain free from falls and injury diuring shift    Over the shift, the patient did not make progress toward the following goals.

## 2024-04-14 NOTE — PROGRESS NOTES
Tana Hargrove is a 79 y.o. female on day 1 of admission presenting with Cholecystitis.      Subjective   History Of Present Illness     Recent admissions:  1.  02/08/2024 till 02/11/2024: Acute renal failure superimposed on CKD, acute on chronic deconditioning with recommendations for SNF but patient refused  2.  02/14/2024 till 02/18/2024: Sepsis without shock with concerns for cellulitis to right great toe, acute on chronic diastolic heart failure, CKD 4  3.  02/26/2024 till 03/05/2024: ESTEE requiring hemodialysis, anemia secondary to mild dysplastic syndrome as well as acute bleeding from tunneled dialysis catheter in the right upper chest wall     Tana Hargrove is a 79 y.o.  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  She presented to the ED after awakening on the evening of 04/12/2024 with sudden onset right lower back pain.  Patient stated that she had very minimal discomfort in her abdomen and had nausea as well as an episode of vomiting but did not have severe back pain in the abdomen as reported by the ED.  She states that the pain was more so at the tip of her scapula and radiating somewhat into her right upper quadrant but more so in the back.  She stated that during the day she had had a cheeseburger for dinner without any symptoms.  She denied any recent sick contacts, chemical/environmental exposures, changes in dietary habits or any recent traumatic events/falls other than noted above.  The only abdominal surgeries that she had noted was a hysterectomy and appendectomy in the very distant past.  She denied any fevers, chills, night sweats, vision changes, auditory  changes, change in taste/smell, loss of bowel/bladder control, loss consciousness, dizziness, vertigo, syncope, seizure-like activity, chest pain, palpitations, shortness of breath, coughing, wheezing, congestion, hemoptysis, hematemesis, diarrhea, constipation, dysuria, hematuria, dyschezia, hematochezia or any lateralizing motor/sensory deficits other than noted above.      She unfortunately was set to be traveling to [place] this morning to attend the  of her sister and states that she would like to hopefully be discharged today if possible but understands if she does need to stay.  She had also stated that she already has dialysis set up to be done in [place] while she is down there.     ED course:  1.  Vital signs on presentation: Temperature 37.2 °C, heart rate of 87, respirations of 20, pulse ox of 96% on room air, blood pressure 174/92  2.  Per discussion with the ED physician the patient had severe intractable abdominal pain that was crippling which prompted admission despite interventions with Dilaudid.  3.  WBC of 7.6 with a repeat of 10.5  4.  Hemoglobin is 7.4 with a repeat of 7.7  5.  Glucose of 200 with a repeat of 179  6.  Sodium of 134 with a repeat 135  7.  Potassium of 3.5 with a repeat of 3.6  8.  Creatinine of 1.46 with a repeat of 1.57  9.  LFTs were within range per lab  10.  Total bilirubin of 0.7  11.  BNP of 330  12.  Troponin of 14 with a repeat of 16  13.  VBG: pH is 7.44, pCO2 46, pO2 47, SO2 of 78, calculated bicarb of 31.2  14.  CXR: Noted no acute cardiopulmonary processes  15.  CT chest/abdomen/pelvis without IV contrast: Cholelithiasis with mild gallbladder wall thickening, small patchy opacity in the dependent portion of the right upper lobe which may represent atelectasis versus infiltrate  16.  Ultrasound gallbladder: Cholelithiasis with nonspecific gallbladder wall thickening with a gallbladder that is nondistended, no pericholecystic fluid or sonographic  Luna sign, gallbladder wall thickening is favored to be secondary hepatocellular disease, renal cortical atrophy and hyper echogenicity suggesting medical renal disease, no hydronephrosis, small cortical cysts  17.  Patient received 2 doses of IV Dilaudid in the ED per the discussion with the ED physician.     4/13/2024: Patient remained NC oxygen 2 L ( no oxygen before admission). She feels better and improved with right side back pain. Surgery Dr. Grimes thought patient has no signs nor symptoms for concerns of biliary colic. Surgery has signed off and ok to discharge. Patient will stay due to hypoxemia. Home O2 evaluation ordered and monitor overnight.     4/14/2024: patient had fever with T 100.6 last night. She now on room air during rest though she still had hypoxemia with exertion. U/A ordered. CT of chest at admission revealed right lobe patchy infiltrate. I started IV Rocephin and Doxy to treat the pneumonia. I reassure and convince patient to stay for further management.        Objective     Last Recorded Vitals  /61 (BP Location: Left arm, Patient Position: Sitting)   Pulse 66   Temp 36.8 °C (98.3 °F) (Temporal)   Resp 19   Wt 67.7 kg (149 lb 4 oz)   SpO2 (!) 87% Comment: pt refused O2 while in chair  Intake/Output last 3 Shifts:    Intake/Output Summary (Last 24 hours) at 4/14/2024 1133  Last data filed at 4/14/2024 0953  Gross per 24 hour   Intake 420 ml   Output --   Net 420 ml       Admission Weight  Weight: 67.7 kg (149 lb 4 oz) (04/13/24 0603)    Daily Weight  04/13/24 : 67.7 kg (149 lb 4 oz)    Image Results  US gallbladder  Narrative: Interpreted By:  Yahir Corrales,   STUDY:  US GALLBLADDER;  4/13/2024 3:59 am      INDICATION:  Signs/Symptoms:abd pain ct concerning for cholecystitis.      COMPARISON:  Correlation made to CT chest abdomen and pelvis from 04/13/2024.      ACCESSION NUMBER(S):  [id number]      ORDERING CLINICIAN:  MORELIA LANTIGUA      TECHNIQUE:  Multiple images of  the right upper quadrant were obtained.      FINDINGS:  LIVER:  The liver measures 14.4 cm and is grossly unremarkable and free of  any focal lesions.          GALLBLADDER:  Cholelithiasis. Nonspecific gallbladder wall thickening measuring up  to 5.7 cm in thickness. Gallbladder nondistended. No pericholecystic  fluid or sonographic Luna's sign.          BILE DUCTS:  No evidence of intra or extrahepatic biliary dilatation is  identified; the common bile duct measures 4 mm.      PANCREAS:  The visualized pancreas is unremarkable in appearance.      RIGHT KIDNEY:  The right kidney measures 8.7 cm in length. Renal cortical atrophy  and hyperechogenicity suggesting medical renal disease. Small  cortical cysts. No hydronephrosis or renal calculi are seen.      Impression: Cholelithiasis and nonspecific gallbladder wall thickening, with  gallbladder nondistended. No pericholecystic fluid or sonographic  Luna's sign. Gallbladder wall thickening is favored to be secondary  to hepatocellular disease.      Renal cortical atrophy and hyperechogenicity suggesting medical renal  disease. No hydronephrosis. Small cortical cysts.          MACRO:  None          Signed by: Yahir Corrales 4/13/2024 4:05 AM  Dictation workstation:   [id number]  CT chest abdomen pelvis wo IV contrast  Narrative: Interpreted By:  Pankaj Flynn,   STUDY:  CT CHEST ABDOMEN PELVIS WO CONTRAST;  4/13/2024 2:38 am      INDICATION:  Signs/Symptoms:sob and pain.      COMPARISON:  None.      ACCESSION NUMBER(S):  [id number]      ORDERING CLINICIAN:  MORELIA LANTIGUA      TECHNIQUE:  Contiguous axial images of the chest, abdomen, and pelvis were  obtained after without contrast. Coronal and sagittal reformatted  images were reconstructed from the axial data.      FINDINGS:  Assessment of the vascular, mediastinal, and abdominopelvic  structures limited without IV contrast.      CT CHEST:      MEDIASTINUM AND LYMPH NODES: The visualized thyroid is within  normal  limits. No enlarged intrathoracic or axillary lymph nodes by imaging  criteria. No pneumomediastinum. The esophagus appears within normal  limits.      HEART: Normal size. Moderate triple-vessel coronary vascular  calcifications. Mild calcifications in the plane of the aortic and  mitral valves. No significant pericardial effusion. The blood pool is  hypodense to the myocardium suggesting anemia.      VESSELS: Vascular patency can not be assessed without IV contrast.  The aorta is normal in caliber. Mild enlargement of the main  pulmonary artery measuring 3.2 cm which may be seen in the setting of  pulmonary arterial hypertension. Moderate calcifications of the  aortic arch. A right IJ central venous catheter terminates in the mid  right atrium.      LUNG, AIRWAYS, PLEURA: The trachea and proximal mainstem bronchi are  patent. Dependent atelectatic changes bilaterally. Dependent patchy  opacity in the right upper lobe. No pleural effusion or pneumothorax.      CHEST WALL SOFT TISSUES: Calcification within the inferior left  breast.      OSSEOUS STRUCTURES: No acute osseous abnormality. Moderate  degenerative changes of the bilateral shoulders.          CT ABDOMEN/PELVIS:      LIVER: No significant parenchymal abnormality.      BILE DUCTS: No significant intrahepatic or extrahepatic dilatation.      GALLBLADDER: Cholelithiasis and mild gallbladder wall thickening.  Sonographic correlation recommended.      PANCREAS: No significant abnormality.      SPLEEN: No significant abnormality.      ADRENALS: No significant abnormality.      KIDNEYS, URETERS, BLADDER: Mild bilateral renal cortical atrophy.  Nonspecific perinephric fat stranding bilaterally. No hydronephrosis  or hydroureter. Bladder is unremarkable.      REPRODUCTIVE ORGANS: Hysterectomy.      GI: No bowel obstruction. No significant bowel wall thickening or  adjacent inflammatory change. Diverticulosis of the sigmoid colon  without evidence of acute  diverticulitis. The appendix is not  visualized.      VESSELS: Moderate calcifications of the aorta and iliac vessels. No  aneurysmal dilatation. Vascular patency can not be assessed without  IV contrast.      PERITONEUM/RETROPERITONEUM: No ascites, free air, or fluid collection.      LYMPH NODES: No enlarged lymph nodes.      ABDOMINAL WALL: No significant abnormality.      OSSEOUS STRUCTURES: Partially visualized right femoral intramedullary  nail with transcervical screw. Posterior spinal fusion hardware  spanning from L2-L5. Associated laminectomy changes at L3-L5.  Intervertebral body spacers at L4-L5 and L5-S1. No acute osseous  abnormality identified.      Impression: 1. Cholelithiasis and mild gallbladder wall thickening. Sonographic  correlation advised.  2. Small patchy opacity in the dependent portion of the right upper  lobe which may represent atelectasis or infiltrate.  3. Additional findings and discussion as above.      MACRO:  None.      Signed by: Pankaj Flynn 4/13/2024 3:06 AM  Dictation workstation:   [id number]  XR chest 1 view  Narrative: Interpreted By:  Yahir Corrales,   STUDY:  XR CHEST 1 VIEW;  4/13/2024 1:55 am      INDICATION:  Signs/Symptoms:cp.      COMPARISON:  03/03/2024.      ACCESSION NUMBER(S):  [id number]      ORDERING CLINICIAN:  MORELIA LANTIGUA      FINDINGS:  AP radiograph of the chest was provided.      Leads overlie the chest, partially obscuring the field-of-view.      Right IJ dual lumen hemodialysis catheter terminates in the right  atrium.      CARDIOMEDIASTINAL SILHOUETTE:  Cardiomediastinal silhouette is normal in size and configuration.      LUNGS:  Similar asymmetric elevation of the right hemidiaphragm. Lungs appear  clear. No pleural effusion or pneumothorax.      ABDOMEN:  No remarkable upper abdominal findings.      BONES:  No acute osseous changes.      Impression: 1.  No evidence of acute cardiopulmonary process.              MACRO:  None      Signed by:  Yahir Corrales 4/13/2024 2:16 AM  Dictation workstation:   [id number]      Physical Exam  Constitutional:       General: She is not in acute distress.     Appearance: Normal appearance.   HENT:      Head: Normocephalic.      Mouth/Throat:      Mouth: Mucous membranes are moist.      Pharynx: Oropharynx is clear.   Eyes:      Pupils: Pupils are equal, round, and reactive to light.   Cardiovascular:      Rate and Rhythm: Normal rate and regular rhythm.      Heart sounds: Normal heart sounds. No murmur heard.     No gallop.   Pulmonary:      Effort: Pulmonary effort is normal.      Breath sounds: Normal breath sounds.   Abdominal:      General: Bowel sounds are normal. There is no distension.      Palpations: Abdomen is soft.      Tenderness: There is no abdominal tenderness.   Musculoskeletal:         General: No swelling. Normal range of motion.      Cervical back: Neck supple. No rigidity.   Skin:     General: Skin is warm and dry.   Neurological:      General: No focal deficit present.      Mental Status: She is alert.      Cranial Nerves: No cranial nerve deficit.      Sensory: No sensory deficit.   Psychiatric:         Mood and Affect: Mood normal.         Behavior: Behavior normal.         Relevant Results             Scheduled medications  amLODIPine, 5 mg, oral, Daily  cefTRIAXone, 1 g, intravenous, q24h  doxycycline, 100 mg, oral, q12h JOSE DE JESUS  heparin (porcine), 5,000 Units, subcutaneous, q8h JOSE DE JESUS  insulin lispro, 0-10 Units, subcutaneous, With meals & nightly  metoprolol tartrate, 25 mg, oral, Daily  pravastatin, 40 mg, oral, Nightly  pregabalin, 100 mg, oral, BID      Continuous medications     PRN medications  PRN medications: dextrose, dextrose, glucagon, glucagon, HYDROmorphone, HYDROmorphone  Results for orders placed or performed during the hospital encounter of 04/13/24 (from the past 96 hour(s))   CBC and Auto Differential   Result Value Ref Range    WBC 7.6 4.4 - 11.3 x10*3/uL    nRBC 0.0 0.0 - 0.0 /100  WBCs    RBC 2.30 (L) 4.00 - 5.20 x10*6/uL    Hemoglobin 7.4 (L) 12.0 - 16.0 g/dL    Hematocrit 22.4 (L) 36.0 - 46.0 %    MCV 97 80 - 100 fL    MCH 32.2 26.0 - 34.0 pg    MCHC 33.0 32.0 - 36.0 g/dL    RDW 23.4 (H) 11.5 - 14.5 %    Platelets 228 150 - 450 x10*3/uL    Neutrophils % 76.0 40.0 - 80.0 %    Immature Granulocytes %, Automated 0.5 0.0 - 0.9 %    Lymphocytes % 13.7 13.0 - 44.0 %    Monocytes % 8.2 2.0 - 10.0 %    Eosinophils % 1.2 0.0 - 6.0 %    Basophils % 0.4 0.0 - 2.0 %    Neutrophils Absolute 5.77 (H) 1.60 - 5.50 x10*3/uL    Immature Granulocytes Absolute, Automated 0.04 0.00 - 0.50 x10*3/uL    Lymphocytes Absolute 1.04 0.80 - 3.00 x10*3/uL    Monocytes Absolute 0.62 0.05 - 0.80 x10*3/uL    Eosinophils Absolute 0.09 0.00 - 0.40 x10*3/uL    Basophils Absolute 0.03 0.00 - 0.10 x10*3/uL   Basic metabolic panel   Result Value Ref Range    Glucose 200 (H) 74 - 99 mg/dL    Sodium 134 (L) 136 - 145 mmol/L    Potassium 3.5 3.5 - 5.3 mmol/L    Chloride 97 (L) 98 - 107 mmol/L    Bicarbonate 31 21 - 32 mmol/L    Anion Gap 10 10 - 20 mmol/L    Urea Nitrogen 16 6 - 23 mg/dL    Creatinine 1.46 (H) 0.50 - 1.05 mg/dL    eGFR 36 (L) >60 mL/min/1.73m*2    Calcium 8.8 8.6 - 10.3 mg/dL   Lipase   Result Value Ref Range    Lipase 36 9 - 82 U/L   Hepatic function panel   Result Value Ref Range    Albumin 4.0 3.4 - 5.0 g/dL    Bilirubin, Total 0.7 0.0 - 1.2 mg/dL    Bilirubin, Direct 0.1 0.0 - 0.3 mg/dL    Alkaline Phosphatase 70 33 - 136 U/L    ALT 16 7 - 45 U/L    AST 23 9 - 39 U/L    Total Protein 6.4 6.4 - 8.2 g/dL   Lactate   Result Value Ref Range    Lactate 1.1 0.4 - 2.0 mmol/L   B-Type Natriuretic Peptide   Result Value Ref Range     (H) 0 - 99 pg/mL   Blood Gas Venous Full Panel   Result Value Ref Range    POCT pH, Venous 7.44 (H) 7.33 - 7.43 pH    POCT pCO2, Venous 46 41 - 51 mm Hg    POCT pO2, Venous 47 (H) 35 - 45 mm Hg    POCT SO2, Venous 78 (H) 45 - 75 %    POCT Oxy Hemoglobin, Venous 76.2 (H) 45.0 - 75.0 %     POCT Hematocrit Calculated, Venous 21.0 (L) 36.0 - 46.0 %    POCT Sodium, Venous 134 (L) 136 - 145 mmol/L    POCT Potassium, Venous 3.4 (L) 3.5 - 5.3 mmol/L    POCT Chloride, Venous 99 98 - 107 mmol/L    POCT Ionized Calicum, Venous 1.17 1.10 - 1.33 mmol/L    POCT Glucose, Venous 204 (H) 74 - 99 mg/dL    POCT Lactate, Venous 1.1 0.4 - 2.0 mmol/L    POCT Base Excess, Venous 6.4 (H) -2.0 - 3.0 mmol/L    POCT HCO3 Calculated, Venous 31.2 (H) 22.0 - 26.0 mmol/L    POCT Hemoglobin, Venous 7.0 (L) 12.0 - 16.0 g/dL    POCT Anion Gap, Venous 7.0 (L) 10.0 - 25.0 mmol/L    Patient Temperature 37.0 degrees Celsius    FiO2 21 %   Troponin I, High Sensitivity, Initial   Result Value Ref Range    Troponin I, High Sensitivity 14 (H) 0 - 13 ng/L   Morphology   Result Value Ref Range    RBC Morphology See Below     Polychromasia Mild     Hypochromia Mild     Target Cells Few     Ovalocytes Few    Troponin, High Sensitivity, 1 Hour   Result Value Ref Range    Troponin I, High Sensitivity 16 (H) 0 - 13 ng/L   CBC   Result Value Ref Range    WBC 10.5 4.4 - 11.3 x10*3/uL    nRBC 0.0 0.0 - 0.0 /100 WBCs    RBC 2.38 (L) 4.00 - 5.20 x10*6/uL    Hemoglobin 7.7 (L) 12.0 - 16.0 g/dL    Hematocrit 23.4 (L) 36.0 - 46.0 %    MCV 98 80 - 100 fL    MCH 32.4 26.0 - 34.0 pg    MCHC 32.9 32.0 - 36.0 g/dL    RDW 23.7 (H) 11.5 - 14.5 %    Platelets 255 150 - 450 x10*3/uL   Renal function panel   Result Value Ref Range    Glucose 179 (H) 74 - 99 mg/dL    Sodium 135 (L) 136 - 145 mmol/L    Potassium 3.6 3.5 - 5.3 mmol/L    Chloride 97 (L) 98 - 107 mmol/L    Bicarbonate 29 21 - 32 mmol/L    Anion Gap 13 10 - 20 mmol/L    Urea Nitrogen 17 6 - 23 mg/dL    Creatinine 1.57 (H) 0.50 - 1.05 mg/dL    eGFR 33 (L) >60 mL/min/1.73m*2    Calcium 9.3 8.6 - 10.3 mg/dL    Phosphorus 3.8 2.5 - 4.9 mg/dL    Albumin 4.0 3.4 - 5.0 g/dL   Magnesium   Result Value Ref Range    Magnesium 1.77 1.60 - 2.40 mg/dL   POCT GLUCOSE   Result Value Ref Range    POCT Glucose 168 (H)  74 - 99 mg/dL   POCT GLUCOSE   Result Value Ref Range    POCT Glucose 104 (H) 74 - 99 mg/dL   POCT GLUCOSE   Result Value Ref Range    POCT Glucose 147 (H) 74 - 99 mg/dL   POCT GLUCOSE   Result Value Ref Range    POCT Glucose 133 (H) 74 - 99 mg/dL   POCT GLUCOSE   Result Value Ref Range    POCT Glucose 117 (H) 74 - 99 mg/dL     *Note: Due to a large number of results and/or encounters for the requested time period, some results have not been displayed. A complete set of results can be found in Results Review.       Assessment/Plan                  Principal Problem:    Cholecystitis    1. Pneumonia of right lung due to infectious organism, unspecified part of lung      fever, hypoxia and CT findings. start empirical Abx and convince to stay for management      2. Cholecystitis      no signs of cholecytitis. Surgery signed off      3. Hypoxia      on NC oxygen, wean as possible, order home oxygen eval.      4. Hypertension, essential      home meds      5. Stage 4 chronic kidney disease (Multi)      nephrology note appreciated.      6. Type 2 diabetes mellitus with stage 4 chronic kidney disease, without long-term current use of insulin (Multi)      SSL and monitor      7. Chronic diastolic heart failure of unknown etiology (Multi)      compensated      8. Chronic bronchitis, unspecified chronic bronchitis type (Multi)      resume home meds                      Tigist Gaines MD

## 2024-04-15 ENCOUNTER — PHARMACY VISIT (OUTPATIENT)
Dept: PHARMACY | Facility: CLINIC | Age: 79
End: 2024-04-15
Payer: MEDICARE

## 2024-04-15 ENCOUNTER — DOCUMENTATION (OUTPATIENT)
Dept: PHARMACY | Facility: HOSPITAL | Age: 79
End: 2024-04-15

## 2024-04-15 ENCOUNTER — APPOINTMENT (OUTPATIENT)
Dept: DIALYSIS | Facility: HOSPITAL | Age: 79
End: 2024-04-15
Payer: MEDICARE

## 2024-04-15 VITALS
SYSTOLIC BLOOD PRESSURE: 158 MMHG | DIASTOLIC BLOOD PRESSURE: 62 MMHG | HEART RATE: 93 BPM | HEIGHT: 61 IN | WEIGHT: 149.25 LBS | TEMPERATURE: 97.9 F | BODY MASS INDEX: 28.18 KG/M2 | OXYGEN SATURATION: 94 % | RESPIRATION RATE: 18 BRPM

## 2024-04-15 PROBLEM — K81.9 CHOLECYSTITIS: Status: RESOLVED | Noted: 2024-04-13 | Resolved: 2024-04-15

## 2024-04-15 LAB
ATRIAL RATE: 75 BPM
GLUCOSE BLD MANUAL STRIP-MCNC: 110 MG/DL (ref 74–99)
P AXIS: 49 DEGREES
PR INTERVAL: 169 MS
Q ONSET: 249 MS
QRS COUNT: 12 BEATS
QRS DURATION: 87 MS
QT INTERVAL: 413 MS
QTC CALCULATION(BAZETT): 462 MS
QTC FREDERICIA: 444 MS
R AXIS: 15 DEGREES
T AXIS: 91 DEGREES
T OFFSET: 456 MS
VENTRICULAR RATE: 75 BPM

## 2024-04-15 PROCEDURE — 2500000004 HC RX 250 GENERAL PHARMACY W/ HCPCS (ALT 636 FOR OP/ED): Performed by: INTERNAL MEDICINE

## 2024-04-15 PROCEDURE — 99239 HOSP IP/OBS DSCHRG MGMT >30: CPT | Performed by: INTERNAL MEDICINE

## 2024-04-15 PROCEDURE — 2500000001 HC RX 250 WO HCPCS SELF ADMINISTERED DRUGS (ALT 637 FOR MEDICARE OP): Performed by: INTERNAL MEDICINE

## 2024-04-15 PROCEDURE — RXMED WILLOW AMBULATORY MEDICATION CHARGE

## 2024-04-15 PROCEDURE — 82947 ASSAY GLUCOSE BLOOD QUANT: CPT

## 2024-04-15 PROCEDURE — 97116 GAIT TRAINING THERAPY: CPT | Mod: GP,CQ

## 2024-04-15 PROCEDURE — 97530 THERAPEUTIC ACTIVITIES: CPT | Mod: GP,CQ

## 2024-04-15 PROCEDURE — G0378 HOSPITAL OBSERVATION PER HR: HCPCS

## 2024-04-15 RX ORDER — CEFDINIR 300 MG/1
300 CAPSULE ORAL 2 TIMES DAILY
Qty: 8 CAPSULE | Refills: 0 | Status: SHIPPED | OUTPATIENT
Start: 2024-04-15 | End: 2024-04-30 | Stop reason: HOSPADM

## 2024-04-15 RX ADMIN — DOXYCYCLINE 100 MG: 100 CAPSULE ORAL at 08:48

## 2024-04-15 RX ADMIN — PREGABALIN 100 MG: 50 CAPSULE ORAL at 08:48

## 2024-04-15 RX ADMIN — HEPARIN SODIUM 5000 UNITS: 5000 INJECTION INTRAVENOUS; SUBCUTANEOUS at 06:01

## 2024-04-15 RX ADMIN — AMLODIPINE BESYLATE 5 MG: 5 TABLET ORAL at 08:48

## 2024-04-15 RX ADMIN — METOPROLOL TARTRATE 25 MG: 25 TABLET, FILM COATED ORAL at 08:48

## 2024-04-15 ASSESSMENT — COGNITIVE AND FUNCTIONAL STATUS - GENERAL
CLIMB 3 TO 5 STEPS WITH RAILING: A LITTLE
CLIMB 3 TO 5 STEPS WITH RAILING: A LITTLE
MOBILITY SCORE: 23
MOBILITY SCORE: 21
MOBILITY SCORE: 23
DAILY ACTIVITIY SCORE: 24
DAILY ACTIVITIY SCORE: 24
WALKING IN HOSPITAL ROOM: A LITTLE
CLIMB 3 TO 5 STEPS WITH RAILING: A LOT

## 2024-04-15 ASSESSMENT — PAIN - FUNCTIONAL ASSESSMENT: PAIN_FUNCTIONAL_ASSESSMENT: 0-10

## 2024-04-15 ASSESSMENT — PAIN SCALES - GENERAL
PAINLEVEL_OUTOF10: 0 - NO PAIN
PAINLEVEL_OUTOF10: 0 - NO PAIN

## 2024-04-15 NOTE — PROGRESS NOTES
Physical Therapy    Physical Therapy Treatment    Patient Name: Tana Hargrove  MRN: [id number]  Today's Date: 4/15/2024  Time Calculation  Start Time: 0848  Stop Time: 0912  Time Calculation (min): 24 min       Assessment/Plan   PT Assessment  PT Assessment Results: Decreased strength, Decreased endurance  Rehab Prognosis: Good  Assessment Comment: pt up in chair upon arrival. pt inreaased gait with FWW. pt had no LOB during amb. pt states she uses her rollator at home. edu on home safety and conserving energy at times.  End of Session Patient Position: Alarm on, Up in chair     PT Plan  Treatment/Interventions: Bed mobility, Transfer training, Gait training, Balance training, Strengthening, Endurance training, Range of motion, Therapeutic exercise, Therapeutic activity, Home exercise program  PT Plan: Skilled PT  PT Frequency: 3 times per week  PT Discharge Recommendations: Moderate intensity level of continued care  PT Recommended Transfer Status: Assist x1  PT - OK to Discharge: Yes      General Visit Information:   PT  Visit  PT Received On: 04/15/24       Subjective   Precautions:     Vital Signs:       Objective   Pain:  Pain Assessment  Pain Score: 0 - No pain  Cognition:  Cognition  Overall Cognitive Status: Within Functional Limits       Treatments:  Therapeutic Exercise  Therapeutic Exercise Performed: Yes  Therapeutic Exercise Activity 1: hip flexion, ankle pumps, LAQ x10-12 ea         Ambulation/Gait Training  Ambulation/Gait Training Performed: Yes  Ambulation/Gait Training 1  Surface 1: Level tile  Device 1: Rolling walker  Assistance 1: Contact guard  Quality of Gait 1: Diminished heel strike, Decreased step length  Comments/Distance (ft) 1: 80  Transfer 1  Technique 1: Sit to stand  Transfer Device 1: Walker  Transfer Level of Assistance 1: Contact guard  Trials/Comments 1: from chair    Outcome Measures:  [facility] Basic Mobility  Turning from your back to your side while in a flat bed without using  bedrails: None  Moving from lying on your back to sitting on the side of a flat bed without using bedrails: None  Moving to and from bed to chair (including a wheelchair): None  Standing up from a chair using your arms (e.g. wheelchair or bedside chair): None  To walk in hospital room: A little  Climbing 3-5 steps with railing: A lot  Basic Mobility - Total Score: 21    Education Documentation  Mobility Training, taught by Mabel Zaman PTA at 4/15/2024  9:49 AM.  Learner: Patient  Readiness: Acceptance  Method: Explanation  Response: Verbalizes Understanding    Education Comments  No comments found.        OP EDUCATION:       Encounter Problems       Encounter Problems (Active)       Mobility       STG - Patient will ambulate up to at least 150' with LRD on even surfaces with SBAx1 and no LOB (Progressing)       Start:  04/13/24    Expected End:  04/27/24               PT Transfers       STG - Transfer from bed to chair with LRD with SBA x1 (Progressing)       Start:  04/13/24    Expected End:  04/27/24            STG - Patient will perform bed mobility with SBA x1 (Progressing)       Start:  04/13/24    Expected End:  04/27/24               Pain - Adult

## 2024-04-15 NOTE — PROGRESS NOTES
Patient is interested in the Platinum program through pharmacy - if readmitted, please contact pharmacy ASAP for enrollment to the program

## 2024-04-15 NOTE — PROGRESS NOTES
Tana Hargrove was referred to the Clinical Pharmacy Team to complete a virtual Transitions of Care encounter for discharge medication optimization. The patient was referred for their Diabetes. The primary goal of this encounter is to ensure the patient's medications are both clinically appropriate and financially feasible for the patient. Pharmacy clinical interventions were provided as noted below.     Attending: Pollo Herbert  _______________________________________________________________________  PHARMACY ASSESSMENT    Meds to beds? [yes]   Home Pharmacy: [id number] - [place], [place] - [address] [facility]   Allergies Reviewed? [yes]    Affordability/Accessibility: Only concern is cost of Januvia - other meds are either reasonable cost or $0 copay  Adherence/Organization: Good with adherence and organization  Adverse Effects: none experienced    _______________________________________________________________________  DIABETES ASSESSMENT    A1C: 6.3  eGFR: 22    CURRENT PHARMACOTHERAPY  Was discharged prior to filling out documentation  Sent home with 4 days BID cefdinir 300mg      HISTORICAL PHARMACOTHERAPY  amLODIPine-Benazepril (LOTREL) 10-40 mg per capsule  Take 1 capsule by mouth once daily.   0     Active   glimepiride (AMARYL) 2 mg tablet  Take 2 mg by mouth twice daily with meals.   0     Active   aspirin, enteric coated 81 mg EC tablet  Take 81 mg by mouth daily at bedtime.   0     Active   simvastatin (ZOCOR) 20 mg tablet  Take 20 mg by mouth daily at bedtime.   0     Active   MENEST 0.3 mg tab  Take 0.3 mg by mouth once daily.   1 05/05/2017   Active   hydroCHLOROthiazide (HYDRODIURIL, ESIDRIX) 25 mg tablet  Take 1 tablet by mouth once daily.   0     Active-    lansoprazole (PREVACID) 30 mg capsule  Take 30 mg by mouth once daily.   0     Active   lisinopril (ZESTRIL, PRINIVIL) 40 mg tablet  Take 40 mg by mouth once daily.   1 04/23/2017   Active   magnesium oxide (MAG-OX) 400 mg tablet  Take 1 tablet  by mouth once daily.            BLOOD SUGAR TESTING AT HOME  -Frequency:   -Meter:   -CGM:     Social History:  [+/-] Alcohol: no  [+/-] Caffeine:  [+/-] Tobacco: no  [+/-] Illicit Drugs: no    Exercise Routine:   -    Additional Contributory Factors [medications, comorbidities]   - past medical history: HTN, HLD, CAD, HFpEF, COPD (no baseline O2), NIDDM2, CKD (recently initiated again on dialysis and she does have a history of requiring dialysis, still makes urine), chronic anemia requiring Procrit and intermittent PRBC infusions (likely in setting of myelodysplastic disease and CKD), OA, anxiety, depression and GERD       SECONDARY PREVENTION  - Statin? [Yes]  - ACE-I/ARB? [Yes]  - Aspirin? [Yes.]  - Last eye exam: unknown  - Last diabetic foot exam: unknown    PATIENT EDUCATION/GOALS  - Fasting B - 130 mg/dL  - Postprandial BG: less than 180 mg/dL  - A1c: less than 7%  - LDL Goal: < 70mg/dL  - Answered all patient questions and concerns  -    - Your blood pressure goal is less than 130/80 mmHg  - [Continue to] integrate exercise into your weekly routine. The recommended exercise regimen is 30-40 minutes of moderate-intensity exercise (such as jogging, biking, swimming, etc.) for 5 days a week.  - [Try/Continue] eating healthy, balanced, appropriately portioned meals 3 times a day. The DASH diet is the recommended diet plan for patients with high blood pressure.  -Limit salt intake and avoid foods high in sodium such as processed meats, salty snacks, and fast food. The recommended daily maximum sodium intake is less than 2,300mg (2 teaspoons) per day.  -Limit cream/sugar additions to coffee and avoid unhealthy caffeinated drinks such as energy drinks or soda.      .    _______________________________________________________________________  RECOMMENDATIONS/PLAN  Please send prescriptions to [facility] pharmacy for assistance on insurance prior authorization and copay. Prescriptions will be delivered to the  patient's bedside prior to discharge with the Meds to Beds program.   Recommend starting [Insert med and dosing strategy]  Consider optimization of statin therapy to atorvastatin or rosuvastatin  Consider whether ACE/ARB is adequate or initiation of entresto necessary for heart failure diagnosis  Continuation of care will be provided by the outpatient clinical pharmacy team in collaboration with the patient's  Primary Care Provider     Verbal consent to manage patient's drug therapy was obtained from the patient. They were informed they may decline to participate or withdraw from participation in pharmacy services at any time.

## 2024-04-15 NOTE — NURSING NOTE
Pt being discharged, removed IV, educated pt on medications and discharge paperwork. Volunteers taking pt to her own vehicle, she states she will be driving home, pt states isn't finding a ride when she can drive. No further needs at this time.

## 2024-04-15 NOTE — PROGRESS NOTES
Social work consult placed for discharge planning/CWI. SW reviewed pt's chart and communicated with TCC. No SW needs foreseen at this time. SW signing off; available upon request.    Luc Bland, MSW, LSW ([id number])   Care Transitions

## 2024-04-16 ENCOUNTER — OFFICE VISIT (OUTPATIENT)
Dept: PRIMARY CARE | Facility: CLINIC | Age: 79
End: 2024-04-16
Payer: MEDICARE

## 2024-04-16 ENCOUNTER — PATIENT OUTREACH (OUTPATIENT)
Dept: CARE COORDINATION | Facility: CLINIC | Age: 79
End: 2024-04-16

## 2024-04-16 VITALS
OXYGEN SATURATION: 96 % | WEIGHT: 153 LBS | SYSTOLIC BLOOD PRESSURE: 160 MMHG | HEART RATE: 75 BPM | HEIGHT: 61 IN | TEMPERATURE: 97.5 F | DIASTOLIC BLOOD PRESSURE: 82 MMHG | BODY MASS INDEX: 28.89 KG/M2 | RESPIRATION RATE: 16 BRPM

## 2024-04-16 DIAGNOSIS — M79.18 MYOFASCIAL PAIN SYNDROME: ICD-10-CM

## 2024-04-16 DIAGNOSIS — I50.33 ACUTE ON CHRONIC DIASTOLIC HEART FAILURE (MULTI): ICD-10-CM

## 2024-04-16 DIAGNOSIS — E11.22 TYPE 2 DIABETES MELLITUS WITH STAGE 4 CHRONIC KIDNEY DISEASE, WITHOUT LONG-TERM CURRENT USE OF INSULIN (MULTI): Chronic | ICD-10-CM

## 2024-04-16 DIAGNOSIS — Z79.899 HIGH RISK MEDICATION USE: ICD-10-CM

## 2024-04-16 DIAGNOSIS — E11.9 TYPE 2 DIABETES MELLITUS WITHOUT COMPLICATION, WITHOUT LONG-TERM CURRENT USE OF INSULIN (MULTI): ICD-10-CM

## 2024-04-16 DIAGNOSIS — Z79.899 CONTROLLED SUBSTANCE AGREEMENT SIGNED: ICD-10-CM

## 2024-04-16 DIAGNOSIS — E78.5 HYPERLIPIDEMIA, UNSPECIFIED HYPERLIPIDEMIA TYPE: ICD-10-CM

## 2024-04-16 DIAGNOSIS — M10.9 GOUT, UNSPECIFIED CAUSE, UNSPECIFIED CHRONICITY, UNSPECIFIED SITE: ICD-10-CM

## 2024-04-16 DIAGNOSIS — Z09 HOSPITAL DISCHARGE FOLLOW-UP: Primary | ICD-10-CM

## 2024-04-16 DIAGNOSIS — N18.4 TYPE 2 DIABETES MELLITUS WITH STAGE 4 CHRONIC KIDNEY DISEASE, WITHOUT LONG-TERM CURRENT USE OF INSULIN (MULTI): Chronic | ICD-10-CM

## 2024-04-16 PROBLEM — I74.9 EMBOLISM (MULTI): Status: RESOLVED | Noted: 2023-07-28 | Resolved: 2024-04-16

## 2024-04-16 LAB
ATRIAL RATE: 87 BPM
BACTERIA UR CULT: ABNORMAL
P AXIS: 64 DEGREES
PR INTERVAL: 220 MS
Q ONSET: 249 MS
QRS COUNT: 14 BEATS
QRS DURATION: 96 MS
QT INTERVAL: 421 MS
QTC CALCULATION(BAZETT): 504 MS
QTC FREDERICIA: 475 MS
R AXIS: 20 DEGREES
T AXIS: 73 DEGREES
T OFFSET: 460 MS
VENTRICULAR RATE: 86 BPM

## 2024-04-16 PROCEDURE — 1159F MED LIST DOCD IN RCRD: CPT | Performed by: STUDENT IN AN ORGANIZED HEALTH CARE EDUCATION/TRAINING PROGRAM

## 2024-04-16 PROCEDURE — 3079F DIAST BP 80-89 MM HG: CPT | Performed by: STUDENT IN AN ORGANIZED HEALTH CARE EDUCATION/TRAINING PROGRAM

## 2024-04-16 PROCEDURE — 1157F ADVNC CARE PLAN IN RCRD: CPT | Performed by: STUDENT IN AN ORGANIZED HEALTH CARE EDUCATION/TRAINING PROGRAM

## 2024-04-16 PROCEDURE — 99496 TRANSJ CARE MGMT HIGH F2F 7D: CPT | Performed by: STUDENT IN AN ORGANIZED HEALTH CARE EDUCATION/TRAINING PROGRAM

## 2024-04-16 PROCEDURE — 1160F RVW MEDS BY RX/DR IN RCRD: CPT | Performed by: STUDENT IN AN ORGANIZED HEALTH CARE EDUCATION/TRAINING PROGRAM

## 2024-04-16 PROCEDURE — 80307 DRUG TEST PRSMV CHEM ANLYZR: CPT

## 2024-04-16 PROCEDURE — 1036F TOBACCO NON-USER: CPT | Performed by: STUDENT IN AN ORGANIZED HEALTH CARE EDUCATION/TRAINING PROGRAM

## 2024-04-16 PROCEDURE — 3077F SYST BP >= 140 MM HG: CPT | Performed by: STUDENT IN AN ORGANIZED HEALTH CARE EDUCATION/TRAINING PROGRAM

## 2024-04-16 PROCEDURE — 1111F DSCHRG MED/CURRENT MED MERGE: CPT | Performed by: STUDENT IN AN ORGANIZED HEALTH CARE EDUCATION/TRAINING PROGRAM

## 2024-04-16 PROCEDURE — 1126F AMNT PAIN NOTED NONE PRSNT: CPT | Performed by: STUDENT IN AN ORGANIZED HEALTH CARE EDUCATION/TRAINING PROGRAM

## 2024-04-16 RX ORDER — PREGABALIN 100 MG/1
CAPSULE ORAL
Qty: 60 CAPSULE | Refills: 0 | Status: ON HOLD | OUTPATIENT
Start: 2024-04-16 | End: 2024-05-28

## 2024-04-16 RX ORDER — PRAVASTATIN SODIUM 40 MG/1
40 TABLET ORAL NIGHTLY
Qty: 90 TABLET | Refills: 1 | Status: ON HOLD | OUTPATIENT
Start: 2024-04-16

## 2024-04-16 RX ORDER — ALLOPURINOL 100 MG/1
100 TABLET ORAL DAILY
Qty: 90 TABLET | Refills: 1 | Status: CANCELLED | OUTPATIENT
Start: 2024-04-16

## 2024-04-16 RX ORDER — PIOGLITAZONEHYDROCHLORIDE 15 MG/1
15 TABLET ORAL DAILY
Qty: 30 TABLET | Refills: 3 | Status: ON HOLD | OUTPATIENT
Start: 2024-04-16

## 2024-04-16 RX ORDER — METOPROLOL TARTRATE 25 MG/1
25 TABLET, FILM COATED ORAL 2 TIMES DAILY
Qty: 60 TABLET | Refills: 3 | Status: SHIPPED | OUTPATIENT
Start: 2024-04-16 | End: 2024-05-28 | Stop reason: HOSPADM

## 2024-04-16 SDOH — ECONOMIC STABILITY: FOOD INSECURITY: WITHIN THE PAST 12 MONTHS, YOU WORRIED THAT YOUR FOOD WOULD RUN OUT BEFORE YOU GOT MONEY TO BUY MORE.: NEVER TRUE

## 2024-04-16 SDOH — ECONOMIC STABILITY: FOOD INSECURITY: WITHIN THE PAST 12 MONTHS, THE FOOD YOU BOUGHT JUST DIDN'T LAST AND YOU DIDN'T HAVE MONEY TO GET MORE.: NEVER TRUE

## 2024-04-16 ASSESSMENT — ENCOUNTER SYMPTOMS
ABDOMINAL PAIN: 0
NAUSEA: 0
FEVER: 0
FATIGUE: 0
VOMITING: 0
DIZZINESS: 0
CONFUSION: 0
WHEEZING: 0
LOSS OF SENSATION IN FEET: 1
SHORTNESS OF BREATH: 0
CONSTIPATION: 0
COLOR CHANGE: 0
UNEXPECTED WEIGHT CHANGE: 0
CHILLS: 0
COUGH: 0
MUSCULOSKELETAL NEGATIVE: 1
DEPRESSION: 0
OCCASIONAL FEELINGS OF UNSTEADINESS: 0
PALPITATIONS: 0
DIARRHEA: 0
HEADACHES: 0

## 2024-04-16 ASSESSMENT — LIFESTYLE VARIABLES
HOW OFTEN DO YOU HAVE SIX OR MORE DRINKS ON ONE OCCASION: NEVER
AUDIT-C TOTAL SCORE: 0
HOW OFTEN DO YOU HAVE A DRINK CONTAINING ALCOHOL: NEVER
SKIP TO QUESTIONS 9-10: 1
HOW MANY STANDARD DRINKS CONTAINING ALCOHOL DO YOU HAVE ON A TYPICAL DAY: PATIENT DOES NOT DRINK

## 2024-04-16 ASSESSMENT — PAIN SCALES - GENERAL: PAINLEVEL: 0-NO PAIN

## 2024-04-16 NOTE — PROGRESS NOTES
Subjective   Patient ID: Tana Hargrove is a 79 y.o. female who presents for Hospital Follow-up (Pt admitted 4/13/2024 - 4/15/2024.  Patient was admitted for Cholecystitis and pneumonia ) and Follow-up (Patient is here for a follow up visit.  Patient would like to make sure it is known she is on dialysis ).    HPI   She is here for hosp disch FU. She was in the  [place] from 04/13/24-04/15/24 for cholecystitis and CAP. She was treated with analgesic, IV abx and discharged home on cefdinir. CT chest R upper lobe PNA and cholelithiasis with mild gall bladder thickening; US gallbladder (04/13/24): cholelithiasis and non-specific gallbladder wall thickening and was thought likely 2/2 hepatocellular disease. Also recently started on HD likely after 2/28/24; having 3 days a wk (M, W & F); states its going okay; following with nephrologist regularly. Reports her breathing stable now, no more SOB and not using O2; denies abd pain, N/V, appetite been normal. States she is taking all her chronic meds as usual, req refills on all of them including lyrica. She is due for CSA & UDS today.     OARRS:  Carlos Higgins MD on 4/16/2024 11:53 AM  I have personally reviewed the OARRS report for Tana Hargrove. I have considered the risks of abuse, dependence, addiction and diversion    Is the patient prescribed a combination of a benzodiazepine and opioid?  No    Last Urine Drug Screen / ordered today: Yes  No results found for this or any previous visit (from the past 8760 hour(s)).  Results are as expected.     Controlled Substance Agreement:  Date of the Last Agreement: 04/16/24  Reviewed Controlled Substance Agreement including but not limited to the benefits, risks, and alternatives to treatment with a Controlled Substance medication(s).    Lyrica:  What is the patient's goal of therapy? Pain control   Is this being achieved with current treatment? Yes     Pain Assessment:  No data recorded    Activities of Daily Living:  Is  "your overall impression that this patient is benefiting (symptom reduction outweighs side effects) from Lyrica therapy? Yes     1. Physical Functioning: Better  2. Family Relationship: Better  3. Social Relationship: Better  4. Mood: Better  5. Sleep Patterns: Better  6. Overall Function: Better    Review of Systems   Constitutional:  Negative for chills, fatigue, fever and unexpected weight change.   HENT: Negative.     Respiratory:  Negative for cough, shortness of breath and wheezing.    Cardiovascular:  Negative for chest pain, palpitations and leg swelling.   Gastrointestinal:  Negative for abdominal pain, constipation, diarrhea, nausea and vomiting.   Musculoskeletal: Negative.    Skin:  Negative for color change and rash.   Neurological:  Negative for dizziness and headaches.   Psychiatric/Behavioral:  Negative for behavioral problems and confusion.        Objective   /82 (BP Location: Left arm, Patient Position: Sitting, BP Cuff Size: Adult)   Pulse 75   Temp 36.4 °C (97.5 °F) (Temporal)   Resp 16   Ht 1.549 m (5' 1\")   Wt 69.4 kg (153 lb)   SpO2 96%   BMI 28.91 kg/m²     Physical Exam  Vitals and nursing note reviewed.   Constitutional:       Appearance: Normal appearance.      Comments: Elderly female with walker.    Cardiovascular:      Rate and Rhythm: Normal rate and regular rhythm.      Pulses: Normal pulses.      Heart sounds: Normal heart sounds.   Pulmonary:      Effort: Pulmonary effort is normal. No respiratory distress.      Breath sounds: Normal breath sounds. No wheezing.   Abdominal:      General: Abdomen is flat. Bowel sounds are normal.      Palpations: Abdomen is soft.   Musculoskeletal:         General: Normal range of motion.   Neurological:      General: No focal deficit present.      Mental Status: She is alert.   Psychiatric:         Mood and Affect: Mood normal.         Behavior: Behavior normal.       Assessment/Plan   She is here for hosp disch FU. She was in the  " [place] from 04/13/24-04/15/24 for cholecystitis and CAP. She is doing much better, adv to cont abx (cefdinir) as rx'd until gone. Adv to let us know if she stats having abd pain or new concerns for possibly seeing gen surg.   She has new dx of ESRD and on HD for a mo now; she will cont HD (M, W & F) as usual and FW nephro as schd.   Other chronic problems been stable including chronic pain; cont Lyrica as usual. Signed CSA & UDS obtained today. Rx refilled accordingly. Her BP remains sl elevated, will inc metop to bid dosing; adv to keep BP logs for NOV. Cont following diabetic diet; A1c at NOV. Otherwise she is doing okay and is clinically & vitally stable. Plan as follows    Problem List Items Addressed This Visit             ICD-10-CM    Hyperlipidemia (Chronic) E78.5    Relevant Medications    pravastatin (Pravachol) 40 mg tablet    Myofascial pain syndrome M79.18    Relevant Medications    pregabalin (Lyrica) 100 mg capsule    Other Relevant Orders    Drug Screen, Urine With Reflex to Confirmation    Gout M10.9    Type 2 diabetes mellitus with stage 4 chronic kidney disease, without long-term current use of insulin (Multi) (Chronic) E11.22, N18.4    Relevant Medications    SITagliptin phosphate (Januvia) 25 mg tablet    Type 2 diabetes mellitus without complications (Multi) E11.9    Relevant Medications    pioglitazone (Actos) 15 mg tablet     Other Visit Diagnoses         Codes    Hospital discharge follow-up    -  Primary Z09    Acute on chronic diastolic heart failure (Multi)     I50.33    Relevant Medications    metoprolol tartrate (Lopressor) 25 mg tablet    Controlled substance agreement signed     Z79.899    High risk medication use     Z79.899    Relevant Orders    Drug Screen, Urine With Reflex to Confirmation           Patient was identified as a fall risk. Risk prevention instructions provided.    Rtc 3 mo for JOJO Higgins MD    [facility], Family Medicine

## 2024-04-16 NOTE — PROGRESS NOTES
Discharge Facility:[facility]  Discharge Diagnosis:Cholecystitis  Admission Date:04/13/24  Discharge Date: 04/15/24    PCP Appointment Date:04/16/24  Specialist Appointment Date:   Hospital Encounter and Summary: Linked  See discharge assessment below for further details    Engagement  Call Start Time: 0900 (4/16/2024  8:58 AM)    Medications  Medications reviewed with patient/caregiver?: Yes (cefdinir 300mg) (4/16/2024  8:58 AM)  Is the patient having any side effects they believe may be caused by any medication additions or changes?: No (4/16/2024  8:58 AM)  Does the patient have all medications ordered at discharge?: Yes (4/16/2024  8:58 AM)  Is the patient taking all medications as directed (includes completed medication regime)?: Yes (4/16/2024  8:58 AM)    Appointments  Does the patient have a primary care provider?: Yes (4/16/2024  8:58 AM)  Care Management Interventions: Verified appointment date/time/provider (4/16/2024  8:58 AM)    Self Management  Has home health visited the patient within 72 hours of discharge?: No (4/16/2024  8:58 AM)  Has all Durable Medical Equipment (DME) been delivered?: No (4/16/2024  8:58 AM)    Patient Teaching  Does the patient have access to their discharge instructions?: Yes (4/16/2024  8:58 AM)  Care Management Interventions: Reviewed instructions with patient (4/16/2024  8:58 AM)  What is the patient's perception of their health status since discharge?: Improving (4/16/2024  8:58 AM)    Wrap Up  Call End Time: 0910 (4/16/2024  8:58 AM)

## 2024-04-17 LAB
AMPHETAMINES UR QL SCN: NORMAL
BARBITURATES UR QL SCN: NORMAL
BENZODIAZ UR QL SCN: NORMAL
BZE UR QL SCN: NORMAL
CANNABINOIDS UR QL SCN: NORMAL
FENTANYL+NORFENTANYL UR QL SCN: NORMAL
METHADONE UR QL SCN: NORMAL
OPIATES UR QL SCN: NORMAL
OXYCODONE+OXYMORPHONE UR QL SCN: NORMAL
PCP UR QL SCN: NORMAL

## 2024-04-18 ENCOUNTER — APPOINTMENT (OUTPATIENT)
Dept: CARDIOLOGY | Facility: HOSPITAL | Age: 79
End: 2024-04-18
Payer: MEDICARE

## 2024-04-18 ENCOUNTER — HOSPITAL ENCOUNTER (OUTPATIENT)
Facility: HOSPITAL | Age: 79
Setting detail: OBSERVATION
Discharge: HOME | End: 2024-04-19
Attending: EMERGENCY MEDICINE | Admitting: HOSPITALIST
Payer: MEDICARE

## 2024-04-18 ENCOUNTER — APPOINTMENT (OUTPATIENT)
Dept: RADIOLOGY | Facility: HOSPITAL | Age: 79
End: 2024-04-18
Payer: MEDICARE

## 2024-04-18 DIAGNOSIS — D64.9 ANEMIA, UNSPECIFIED TYPE: ICD-10-CM

## 2024-04-18 DIAGNOSIS — K82.8 THICKENING OF WALL OF GALLBLADDER: ICD-10-CM

## 2024-04-18 DIAGNOSIS — K80.10 CALCULUS OF GALLBLADDER WITH CHOLECYSTITIS WITHOUT BILIARY OBSTRUCTION, UNSPECIFIED CHOLECYSTITIS ACUITY: Primary | ICD-10-CM

## 2024-04-18 DIAGNOSIS — R79.89 ELEVATED LFTS: ICD-10-CM

## 2024-04-18 PROBLEM — K21.9 GERD (GASTROESOPHAGEAL REFLUX DISEASE): Chronic | Status: ACTIVE | Noted: 2023-05-04

## 2024-04-18 PROBLEM — N18.6 ANEMIA DUE TO CHRONIC KIDNEY DISEASE, ON CHRONIC DIALYSIS (MULTI): Chronic | Status: ACTIVE | Noted: 2024-04-18

## 2024-04-18 PROBLEM — D63.1 ANEMIA DUE TO CHRONIC KIDNEY DISEASE, ON CHRONIC DIALYSIS (MULTI): Chronic | Status: ACTIVE | Noted: 2024-04-18

## 2024-04-18 PROBLEM — Z99.2 ANEMIA DUE TO CHRONIC KIDNEY DISEASE, ON CHRONIC DIALYSIS (MULTI): Chronic | Status: ACTIVE | Noted: 2024-04-18

## 2024-04-18 PROBLEM — D46.9 MYELODYSPLASTIC SYNDROME (MULTI): Chronic | Status: ACTIVE | Noted: 2023-05-04

## 2024-04-18 PROBLEM — N18.6 ESRD (END STAGE RENAL DISEASE) ON DIALYSIS (MULTI): Chronic | Status: ACTIVE | Noted: 2024-04-18

## 2024-04-18 PROBLEM — R10.9 ABDOMINAL PAIN: Status: ACTIVE | Noted: 2024-04-18

## 2024-04-18 PROBLEM — I50.32 CHRONIC DIASTOLIC HEART FAILURE OF UNKNOWN ETIOLOGY (MULTI): Chronic | Status: ACTIVE | Noted: 2023-05-04

## 2024-04-18 PROBLEM — Z99.2 ESRD (END STAGE RENAL DISEASE) ON DIALYSIS (MULTI): Chronic | Status: ACTIVE | Noted: 2024-04-18

## 2024-04-18 LAB
ABO GROUP (TYPE) IN BLOOD: NORMAL
ALBUMIN SERPL BCP-MCNC: 3.7 G/DL (ref 3.4–5)
ALP SERPL-CCNC: 373 U/L (ref 33–136)
ALT SERPL W P-5'-P-CCNC: 77 U/L (ref 7–45)
ANION GAP SERPL CALC-SCNC: 10 MMOL/L (ref 10–20)
ANTIBODY SCREEN: NORMAL
APPEARANCE UR: CLEAR
AST SERPL W P-5'-P-CCNC: 90 U/L (ref 9–39)
ATRIAL RATE: 70 BPM
BASO STIPL BLD QL SMEAR: PRESENT
BASOPHILS # BLD AUTO: 0.02 X10*3/UL (ref 0–0.1)
BASOPHILS NFR BLD AUTO: 0.4 %
BILIRUB SERPL-MCNC: 0.7 MG/DL (ref 0–1.2)
BILIRUB UR STRIP.AUTO-MCNC: NEGATIVE MG/DL
BLOOD EXPIRATION DATE: NORMAL
BUN SERPL-MCNC: 24 MG/DL (ref 6–23)
CALCIUM SERPL-MCNC: 9.2 MG/DL (ref 8.6–10.3)
CHLORIDE SERPL-SCNC: 99 MMOL/L (ref 98–107)
CO2 SERPL-SCNC: 31 MMOL/L (ref 21–32)
COLOR UR: YELLOW
CREAT SERPL-MCNC: 2.07 MG/DL (ref 0.5–1.05)
DISPENSE STATUS: NORMAL
EGFRCR SERPLBLD CKD-EPI 2021: 24 ML/MIN/1.73M*2
EOSINOPHIL # BLD AUTO: 0.13 X10*3/UL (ref 0–0.4)
EOSINOPHIL NFR BLD AUTO: 2.6 %
ERYTHROCYTE [DISTWIDTH] IN BLOOD BY AUTOMATED COUNT: 23.3 % (ref 11.5–14.5)
GLUCOSE SERPL-MCNC: 185 MG/DL (ref 74–99)
GLUCOSE UR STRIP.AUTO-MCNC: ABNORMAL MG/DL
HCT VFR BLD AUTO: 20.4 % (ref 36–46)
HCT VFR BLD AUTO: 24.8 % (ref 36–46)
HGB BLD-MCNC: 6.4 G/DL (ref 12–16)
HGB BLD-MCNC: 8.2 G/DL (ref 12–16)
HYPOCHROMIA BLD QL SMEAR: NORMAL
IMM GRANULOCYTES # BLD AUTO: 0.02 X10*3/UL (ref 0–0.5)
IMM GRANULOCYTES NFR BLD AUTO: 0.4 % (ref 0–0.9)
KETONES UR STRIP.AUTO-MCNC: NEGATIVE MG/DL
LEUKOCYTE ESTERASE UR QL STRIP.AUTO: NEGATIVE
LIPASE SERPL-CCNC: 43 U/L (ref 9–82)
LYMPHOCYTES # BLD AUTO: 1 X10*3/UL (ref 0.8–3)
LYMPHOCYTES NFR BLD AUTO: 20 %
MCH RBC QN AUTO: 31.7 PG (ref 26–34)
MCHC RBC AUTO-ENTMCNC: 31.4 G/DL (ref 32–36)
MCV RBC AUTO: 101 FL (ref 80–100)
MONOCYTES # BLD AUTO: 0.55 X10*3/UL (ref 0.05–0.8)
MONOCYTES NFR BLD AUTO: 11 %
NEUTROPHILS # BLD AUTO: 3.29 X10*3/UL (ref 1.6–5.5)
NEUTROPHILS NFR BLD AUTO: 65.6 %
NITRITE UR QL STRIP.AUTO: NEGATIVE
NRBC BLD-RTO: 0 /100 WBCS (ref 0–0)
P AXIS: 46 DEGREES
PH UR STRIP.AUTO: 7 [PH]
PLATELET # BLD AUTO: 203 X10*3/UL (ref 150–450)
POTASSIUM SERPL-SCNC: 3.8 MMOL/L (ref 3.5–5.3)
PR INTERVAL: 180 MS
PRODUCT BLOOD TYPE: 600
PRODUCT CODE: NORMAL
PROT SERPL-MCNC: 6.1 G/DL (ref 6.4–8.2)
PROT UR STRIP.AUTO-MCNC: ABNORMAL MG/DL
Q ONSET: 249 MS
QRS COUNT: 11 BEATS
QRS DURATION: 91 MS
QT INTERVAL: 456 MS
QTC CALCULATION(BAZETT): 496 MS
QTC FREDERICIA: 482 MS
R AXIS: 6 DEGREES
RBC # BLD AUTO: 2.02 X10*6/UL (ref 4–5.2)
RBC # UR STRIP.AUTO: NEGATIVE /UL
RBC #/AREA URNS AUTO: NORMAL /HPF
RBC MORPH BLD: NORMAL
RH FACTOR (ANTIGEN D): NORMAL
SODIUM SERPL-SCNC: 136 MMOL/L (ref 136–145)
SP GR UR STRIP.AUTO: 1.01
SQUAMOUS #/AREA URNS AUTO: NORMAL /HPF
T AXIS: 88 DEGREES
T OFFSET: 477 MS
UNIT ABO: NORMAL
UNIT NUMBER: NORMAL
UNIT RH: NORMAL
UNIT VOLUME: 350
UROBILINOGEN UR STRIP.AUTO-MCNC: <2 MG/DL
VENTRICULAR RATE: 71 BPM
WBC # BLD AUTO: 5 X10*3/UL (ref 4.4–11.3)
WBC #/AREA URNS AUTO: NORMAL /HPF
XM INTEP: NORMAL

## 2024-04-18 PROCEDURE — 93005 ELECTROCARDIOGRAM TRACING: CPT

## 2024-04-18 PROCEDURE — 83690 ASSAY OF LIPASE: CPT | Performed by: EMERGENCY MEDICINE

## 2024-04-18 PROCEDURE — 76705 ECHO EXAM OF ABDOMEN: CPT

## 2024-04-18 PROCEDURE — 99285 EMERGENCY DEPT VISIT HI MDM: CPT | Mod: 25

## 2024-04-18 PROCEDURE — 2500000001 HC RX 250 WO HCPCS SELF ADMINISTERED DRUGS (ALT 637 FOR MEDICARE OP): Performed by: STUDENT IN AN ORGANIZED HEALTH CARE EDUCATION/TRAINING PROGRAM

## 2024-04-18 PROCEDURE — G0378 HOSPITAL OBSERVATION PER HR: HCPCS

## 2024-04-18 PROCEDURE — 86902 BLOOD TYPE ANTIGEN DONOR EA: CPT | Mod: 91

## 2024-04-18 PROCEDURE — 2500000001 HC RX 250 WO HCPCS SELF ADMINISTERED DRUGS (ALT 637 FOR MEDICARE OP): Performed by: PHARMACIST

## 2024-04-18 PROCEDURE — 36415 COLL VENOUS BLD VENIPUNCTURE: CPT | Performed by: EMERGENCY MEDICINE

## 2024-04-18 PROCEDURE — 81001 URINALYSIS AUTO W/SCOPE: CPT | Performed by: EMERGENCY MEDICINE

## 2024-04-18 PROCEDURE — 74183 MRI ABD W/O CNTR FLWD CNTR: CPT | Performed by: RADIOLOGY

## 2024-04-18 PROCEDURE — 76705 ECHO EXAM OF ABDOMEN: CPT | Performed by: RADIOLOGY

## 2024-04-18 PROCEDURE — 2550000001 HC RX 255 CONTRASTS: Performed by: HOSPITALIST

## 2024-04-18 PROCEDURE — 74185 MRA ABD W OR W/O CNTRST: CPT

## 2024-04-18 PROCEDURE — 84075 ASSAY ALKALINE PHOSPHATASE: CPT | Performed by: EMERGENCY MEDICINE

## 2024-04-18 PROCEDURE — 86922 COMPATIBILITY TEST ANTIGLOB: CPT

## 2024-04-18 PROCEDURE — A9575 INJ GADOTERATE MEGLUMI 0.1ML: HCPCS | Performed by: HOSPITALIST

## 2024-04-18 PROCEDURE — 85018 HEMOGLOBIN: CPT | Performed by: STUDENT IN AN ORGANIZED HEALTH CARE EDUCATION/TRAINING PROGRAM

## 2024-04-18 PROCEDURE — 86900 BLOOD TYPING SEROLOGIC ABO: CPT | Mod: 91 | Performed by: EMERGENCY MEDICINE

## 2024-04-18 PROCEDURE — 99222 1ST HOSP IP/OBS MODERATE 55: CPT | Performed by: SURGERY

## 2024-04-18 PROCEDURE — 85025 COMPLETE CBC W/AUTO DIFF WBC: CPT | Performed by: EMERGENCY MEDICINE

## 2024-04-18 PROCEDURE — 99291 CRITICAL CARE FIRST HOUR: CPT | Performed by: EMERGENCY MEDICINE

## 2024-04-18 PROCEDURE — 36430 TRANSFUSION BLD/BLD COMPNT: CPT

## 2024-04-18 PROCEDURE — 99223 1ST HOSP IP/OBS HIGH 75: CPT | Performed by: STUDENT IN AN ORGANIZED HEALTH CARE EDUCATION/TRAINING PROGRAM

## 2024-04-18 PROCEDURE — P9040 RBC LEUKOREDUCED IRRADIATED: HCPCS

## 2024-04-18 RX ORDER — CEFDINIR 300 MG/1
300 CAPSULE ORAL DAILY
Status: DISCONTINUED | OUTPATIENT
Start: 2024-04-18 | End: 2024-04-19 | Stop reason: HOSPADM

## 2024-04-18 RX ORDER — PANTOPRAZOLE SODIUM 40 MG/1
40 TABLET, DELAYED RELEASE ORAL
Status: DISCONTINUED | OUTPATIENT
Start: 2024-04-19 | End: 2024-04-19 | Stop reason: HOSPADM

## 2024-04-18 RX ORDER — GADOTERATE MEGLUMINE 376.9 MG/ML
0.2 INJECTION INTRAVENOUS
Status: COMPLETED | OUTPATIENT
Start: 2024-04-18 | End: 2024-04-18

## 2024-04-18 RX ORDER — CEFDINIR 300 MG/1
300 CAPSULE ORAL 2 TIMES DAILY
Status: DISCONTINUED | OUTPATIENT
Start: 2024-04-18 | End: 2024-04-18

## 2024-04-18 RX ORDER — PANTOPRAZOLE SODIUM 40 MG/10ML
40 INJECTION, POWDER, LYOPHILIZED, FOR SOLUTION INTRAVENOUS
Status: DISCONTINUED | OUTPATIENT
Start: 2024-04-19 | End: 2024-04-19 | Stop reason: HOSPADM

## 2024-04-18 RX ORDER — SEVELAMER CARBONATE 800 MG/1
800 TABLET, FILM COATED ORAL
Status: DISCONTINUED | OUTPATIENT
Start: 2024-04-18 | End: 2024-04-19 | Stop reason: HOSPADM

## 2024-04-18 RX ORDER — ALLOPURINOL 100 MG/1
100 TABLET ORAL DAILY
Status: DISCONTINUED | OUTPATIENT
Start: 2024-04-18 | End: 2024-04-19 | Stop reason: HOSPADM

## 2024-04-18 RX ORDER — BISACODYL 5 MG
10 TABLET, DELAYED RELEASE (ENTERIC COATED) ORAL DAILY PRN
Status: DISCONTINUED | OUTPATIENT
Start: 2024-04-18 | End: 2024-04-19 | Stop reason: HOSPADM

## 2024-04-18 RX ORDER — ONDANSETRON 4 MG/1
4 TABLET, FILM COATED ORAL EVERY 8 HOURS PRN
Status: DISCONTINUED | OUTPATIENT
Start: 2024-04-18 | End: 2024-04-19 | Stop reason: HOSPADM

## 2024-04-18 RX ORDER — METOPROLOL TARTRATE 25 MG/1
25 TABLET, FILM COATED ORAL 2 TIMES DAILY
Status: DISCONTINUED | OUTPATIENT
Start: 2024-04-18 | End: 2024-04-19 | Stop reason: HOSPADM

## 2024-04-18 RX ORDER — AMLODIPINE BESYLATE 5 MG/1
5 TABLET ORAL DAILY
Status: DISCONTINUED | OUTPATIENT
Start: 2024-04-18 | End: 2024-04-19 | Stop reason: HOSPADM

## 2024-04-18 RX ORDER — GUAIFENESIN 600 MG/1
600 TABLET, EXTENDED RELEASE ORAL EVERY 12 HOURS PRN
Status: DISCONTINUED | OUTPATIENT
Start: 2024-04-18 | End: 2024-04-19 | Stop reason: HOSPADM

## 2024-04-18 RX ORDER — PREGABALIN 50 MG/1
100 CAPSULE ORAL 2 TIMES DAILY
Status: DISCONTINUED | OUTPATIENT
Start: 2024-04-18 | End: 2024-04-19 | Stop reason: HOSPADM

## 2024-04-18 RX ORDER — ACETAMINOPHEN 325 MG/1
650 TABLET ORAL EVERY 4 HOURS PRN
Status: DISCONTINUED | OUTPATIENT
Start: 2024-04-18 | End: 2024-04-19 | Stop reason: HOSPADM

## 2024-04-18 RX ORDER — POLYETHYLENE GLYCOL 3350 17 G/17G
17 POWDER, FOR SOLUTION ORAL DAILY
Status: DISCONTINUED | OUTPATIENT
Start: 2024-04-18 | End: 2024-04-19 | Stop reason: HOSPADM

## 2024-04-18 RX ORDER — CHOLECALCIFEROL (VITAMIN D3) 25 MCG
2000 TABLET ORAL DAILY
Status: DISCONTINUED | OUTPATIENT
Start: 2024-04-18 | End: 2024-04-19 | Stop reason: HOSPADM

## 2024-04-18 RX ORDER — TALC
3 POWDER (GRAM) TOPICAL NIGHTLY
Status: DISCONTINUED | OUTPATIENT
Start: 2024-04-18 | End: 2024-04-19 | Stop reason: HOSPADM

## 2024-04-18 RX ORDER — LANOLIN ALCOHOL/MO/W.PET/CERES
1000 CREAM (GRAM) TOPICAL DAILY
Status: DISCONTINUED | OUTPATIENT
Start: 2024-04-18 | End: 2024-04-19 | Stop reason: HOSPADM

## 2024-04-18 RX ORDER — PRAVASTATIN SODIUM 40 MG/1
40 TABLET ORAL NIGHTLY
Status: DISCONTINUED | OUTPATIENT
Start: 2024-04-18 | End: 2024-04-19 | Stop reason: HOSPADM

## 2024-04-18 RX ORDER — ONDANSETRON HYDROCHLORIDE 2 MG/ML
4 INJECTION, SOLUTION INTRAVENOUS EVERY 8 HOURS PRN
Status: DISCONTINUED | OUTPATIENT
Start: 2024-04-18 | End: 2024-04-19 | Stop reason: HOSPADM

## 2024-04-18 RX ADMIN — CEFDINIR 300 MG: 300 CAPSULE ORAL at 21:07

## 2024-04-18 RX ADMIN — METOPROLOL TARTRATE 25 MG: 25 TABLET, FILM COATED ORAL at 21:07

## 2024-04-18 RX ADMIN — PREGABALIN 100 MG: 50 CAPSULE ORAL at 21:13

## 2024-04-18 RX ADMIN — PRAVASTATIN SODIUM 40 MG: 40 TABLET ORAL at 21:07

## 2024-04-18 RX ADMIN — Medication 3 MG: at 21:07

## 2024-04-18 RX ADMIN — GADOTERATE MEGLUMINE 14 ML: 376.9 INJECTION INTRAVENOUS at 20:21

## 2024-04-18 SDOH — SOCIAL STABILITY: SOCIAL INSECURITY: WERE YOU ABLE TO COMPLETE ALL THE BEHAVIORAL HEALTH SCREENINGS?: NO

## 2024-04-18 ASSESSMENT — COGNITIVE AND FUNCTIONAL STATUS - GENERAL
MOBILITY SCORE: 22
MOBILITY SCORE: 21
WALKING IN HOSPITAL ROOM: A LITTLE
STANDING UP FROM CHAIR USING ARMS: A LITTLE
WALKING IN HOSPITAL ROOM: A LITTLE
DAILY ACTIVITIY SCORE: 24
DAILY ACTIVITIY SCORE: 24
PATIENT BASELINE BEDBOUND: NO
CLIMB 3 TO 5 STEPS WITH RAILING: A LITTLE
CLIMB 3 TO 5 STEPS WITH RAILING: A LITTLE

## 2024-04-18 ASSESSMENT — ENCOUNTER SYMPTOMS
PALPITATIONS: 0
DYSURIA: 0
CONFUSION: 0
COLOR CHANGE: 0
PHOTOPHOBIA: 0
FLANK PAIN: 0
BLOOD IN STOOL: 0
FEVER: 0
CONSTIPATION: 0
VOMITING: 0
BACK PAIN: 1
WEAKNESS: 0
APPETITE CHANGE: 0
ANAL BLEEDING: 0
CHILLS: 0
DIFFICULTY URINATING: 0
ARTHRALGIAS: 0
WOUND: 0
ABDOMINAL DISTENTION: 0
ABDOMINAL PAIN: 1
HEMATURIA: 0
TREMORS: 0
DIZZINESS: 0
DIARRHEA: 0
SHORTNESS OF BREATH: 0
CHEST TIGHTNESS: 0
FATIGUE: 0
LIGHT-HEADEDNESS: 0
POLYDIPSIA: 0
HEADACHES: 0
NAUSEA: 0
COUGH: 0
SLEEP DISTURBANCE: 0

## 2024-04-18 ASSESSMENT — PAIN - FUNCTIONAL ASSESSMENT
PAIN_FUNCTIONAL_ASSESSMENT: 0-10

## 2024-04-18 ASSESSMENT — ACTIVITIES OF DAILY LIVING (ADL)
FEEDING YOURSELF: INDEPENDENT
LACK_OF_TRANSPORTATION: NO
TOILETING: INDEPENDENT
DRESSING YOURSELF: INDEPENDENT
JUDGMENT_ADEQUATE_SAFELY_COMPLETE_DAILY_ACTIVITIES: YES
BATHING: INDEPENDENT
HEARING - LEFT EAR: FUNCTIONAL
GROOMING: INDEPENDENT
ASSISTIVE_DEVICE: WALKER
PATIENT'S MEMORY ADEQUATE TO SAFELY COMPLETE DAILY ACTIVITIES?: YES
ADEQUATE_TO_COMPLETE_ADL: YES
WALKS IN HOME: NEEDS ASSISTANCE
HEARING - RIGHT EAR: FUNCTIONAL

## 2024-04-18 ASSESSMENT — PAIN DESCRIPTION - PAIN TYPE: TYPE: ACUTE PAIN

## 2024-04-18 ASSESSMENT — PAIN SCALES - GENERAL
PAINLEVEL_OUTOF10: 0 - NO PAIN
PAINLEVEL_OUTOF10: 10 - WORST POSSIBLE PAIN
PAINLEVEL_OUTOF10: 0 - NO PAIN

## 2024-04-18 ASSESSMENT — PAIN DESCRIPTION - DESCRIPTORS: DESCRIPTORS: DULL;ACHING

## 2024-04-18 ASSESSMENT — PAIN DESCRIPTION - LOCATION: LOCATION: ABDOMEN

## 2024-04-18 ASSESSMENT — PAIN DESCRIPTION - FREQUENCY: FREQUENCY: INTERMITTENT

## 2024-04-18 NOTE — H&P
[facility] - GENERAL MEDICINE HISTORY AND PHYSICAL    History Obtained From: Patient    History Of Present Illness:  Tana Hargrove is a 79 y.o. female with PMHx s/f HTN, HLD, CAD, HFpEF, COPD (no baseline O2), NIDDM2, ESRD on HD, chronic anemia requiring Procrit and intermittent PRBC infusions (in setting of MDS and ESRD), OA, anxiety and depression, and GERD, presenting with sudden onset upper abdominal pain which wraps around to the right middle part of her back. Patient reports that she was recently admitted and discharged, had a similar complaint during that last admission and was evaluated at that time with an ultrasound. Today, however, she was visiting a friend in the hospital, when she had sudden onset of upper abdominal pain with a bandlike sensation which she describes felt like it came actually from the middle/right side of her back and wraps around the front.  Was not really associated with any nausea, no vomiting/fever/chills, no shortness of breath, dizziness or lightheadedness, diaphoresis, headache, vision changes. She reports that she has been eating and drinking normally, having normal bowel movements (she denies any associated melena, hematochezia, hemoptysis, hematemesis). By the time she came down from visiting her friend in the hospital to the ER, her symptoms had subsided. At time of my exam she had started her blood transfusion, and had been told by both the emergency department and surgery team the plan was for her to be admitted and get additional imaging, and she was resting comfortably.    ED Course (Summary):   Vitals on presentation: T97.0, /68, HR 61, RR 18, SpO2 95% RA  Labs: CBC with WBC 5.0, Hgb 6.4, platelets 203.  CMP with glucose 185, sodium 136, potassium 3.8, BUN 24, serum creatinine 2.07, alk phos 373, AST 90, ALT 77, total bili 0.7.  Lipase 43.  UA negative for infection.  Imaging: Ultrasound of the gallbladder was notable for slightly thick-walled  gallbladder containing partially shadowing stones and/or sludge with overlying tenderness and a reported positive Luna sign from the ultrasound technician  Interventions: 1 unit PRBCs (currently infusing), surgery consultation with plans for MRCP    ED Course (From Provider):  Diagnoses as of 04/18/24 1738   Calculus of gallbladder with cholecystitis without biliary obstruction, unspecified cholecystitis acuity   Anemia, unspecified type     Relevant Results  Results for orders placed or performed during the hospital encounter of 04/18/24 (from the past 24 hour(s))   ECG 12 lead   Result Value Ref Range    Ventricular Rate 71 BPM    Atrial Rate 70 BPM    ME Interval 180 ms    QRS Duration 91 ms    QT Interval 456 ms    QTC Calculation(Bazett) 496 ms    P Axis 46 degrees    R Axis 6 degrees    T Axis 88 degrees    QRS Count 11 beats    Q Onset 249 ms    T Offset 477 ms    QTC Fredericia 482 ms   CBC and Auto Differential   Result Value Ref Range    WBC 5.0 4.4 - 11.3 x10*3/uL    nRBC 0.0 0.0 - 0.0 /100 WBCs    RBC 2.02 (L) 4.00 - 5.20 x10*6/uL    Hemoglobin 6.4 (LL) 12.0 - 16.0 g/dL    Hematocrit 20.4 (L) 36.0 - 46.0 %     (H) 80 - 100 fL    MCH 31.7 26.0 - 34.0 pg    MCHC 31.4 (L) 32.0 - 36.0 g/dL    RDW 23.3 (H) 11.5 - 14.5 %    Platelets 203 150 - 450 x10*3/uL    Neutrophils % 65.6 40.0 - 80.0 %    Immature Granulocytes %, Automated 0.4 0.0 - 0.9 %    Lymphocytes % 20.0 13.0 - 44.0 %    Monocytes % 11.0 2.0 - 10.0 %    Eosinophils % 2.6 0.0 - 6.0 %    Basophils % 0.4 0.0 - 2.0 %    Neutrophils Absolute 3.29 1.60 - 5.50 x10*3/uL    Immature Granulocytes Absolute, Automated 0.02 0.00 - 0.50 x10*3/uL    Lymphocytes Absolute 1.00 0.80 - 3.00 x10*3/uL    Monocytes Absolute 0.55 0.05 - 0.80 x10*3/uL    Eosinophils Absolute 0.13 0.00 - 0.40 x10*3/uL    Basophils Absolute 0.02 0.00 - 0.10 x10*3/uL   Comprehensive metabolic panel   Result Value Ref Range    Glucose 185 (H) 74 - 99 mg/dL    Sodium 136 136 - 145  mmol/L    Potassium 3.8 3.5 - 5.3 mmol/L    Chloride 99 98 - 107 mmol/L    Bicarbonate 31 21 - 32 mmol/L    Anion Gap 10 10 - 20 mmol/L    Urea Nitrogen 24 (H) 6 - 23 mg/dL    Creatinine 2.07 (H) 0.50 - 1.05 mg/dL    eGFR 24 (L) >60 mL/min/1.73m*2    Calcium 9.2 8.6 - 10.3 mg/dL    Albumin 3.7 3.4 - 5.0 g/dL    Alkaline Phosphatase 373 (H) 33 - 136 U/L    Total Protein 6.1 (L) 6.4 - 8.2 g/dL    AST 90 (H) 9 - 39 U/L    Bilirubin, Total 0.7 0.0 - 1.2 mg/dL    ALT 77 (H) 7 - 45 U/L   Lipase   Result Value Ref Range    Lipase 43 9 - 82 U/L   Morphology   Result Value Ref Range    RBC Morphology See Below     Hypochromia Mild     Basophilic Stippling Present    Prepare RBC: 1 Units   Result Value Ref Range    PRODUCT CODE [id number]     Unit Number [id number]     Unit ABO A     Unit RH NEG     XM INTEP COMP     Dispense Status IS     Blood Expiration Date April 25, 2024 23:59 EDT     PRODUCT BLOOD TYPE 0600     UNIT VOLUME 350    Type and Screen   Result Value Ref Range    ABO TYPE A     Rh TYPE POS     ANTIBODY SCREEN NEG    Urinalysis with Reflex Culture and Microscopic   Result Value Ref Range    Color, Urine Yellow Straw, Yellow    Appearance, Urine Clear Clear    Specific Gravity, Urine 1.006 1.005 - 1.035    pH, Urine 7.0 5.0, 5.5, 6.0, 6.5, 7.0, 7.5, 8.0    Protein, Urine 100 (2+) (N) NEGATIVE mg/dL    Glucose, Urine 50 (1+) (A) NEGATIVE mg/dL    Blood, Urine NEGATIVE NEGATIVE    Ketones, Urine NEGATIVE NEGATIVE mg/dL    Bilirubin, Urine NEGATIVE NEGATIVE    Urobilinogen, Urine <2.0 <2.0 mg/dL    Nitrite, Urine NEGATIVE NEGATIVE    Leukocyte Esterase, Urine NEGATIVE NEGATIVE   Urinalysis Microscopic   Result Value Ref Range    WBC, Urine 1-5 1-5, NONE /HPF    RBC, Urine NONE NONE, 1-2, 3-5 /HPF    Squamous Epithelial Cells, Urine 1-9 (SPARSE) Reference range not established. /HPF     *Note: Due to a large number of results and/or encounters for the requested time period, some results have not been displayed. A  complete set of results can be found in Results Review.      ECG 12 lead    Result Date: 4/18/2024  Sinus rhythm Nonspecific T abnrm, anterolateral leads Borderline prolonged QT interval See ED provider note for full interpretation and clinical correlation Confirmed by Terri Bermudez ([id number]) on 4/18/2024 1:39:30 PM    US gallbladder    Result Date: 4/18/2024  Interpreted By:  Kalpana Izaguirre, STUDY: US GALLBLADDER;  4/18/2024 1:01 pm   INDICATION: Signs/Symptoms:abdominal painBowel.   COMPARISON: 04/13/2024   ACCESSION NUMBER(S): [id number]   ORDERING CLINICIAN: JESÚS GALLOWAY   TECHNIQUE: Multiple images of the right upper quadrant were obtained.   FINDINGS: LIVER:  Normal size and echogenicity. No focal lesion demonstrated.   GALLBLADDER:   Normally distended. Contains partially shadowing gallstones and sludge. Mild anterior wall thickening at 3.8 mm. Sonographic Luna sign is reportedly positive.   BILIARY TREE: The common duct measures  2 mm.   PANCREAS:   Partially obscured by overlying bowel gas.  Visualized portions within normal limits.   RIGHT KIDNEY: Increased echogenicity. Normal in size. Mild hydronephrosis. 1 cm cyst in the midpole.       Slightly thick-walled gallbladder containing partially shadowing stones and/or sludge with overlying tenderness. Findings are consistent with acute cholecystitis. Clinical correlation recommended.   Mild right hydronephrosis. Small right renal cyst. Increased echogenicity of the right kidney consistent with medical renal disease.   MACRO: None.   Signed by: Kalpana Izaguirre 4/18/2024 1:35 PM Dictation workstation:   [id number]     Scheduled medications:  allopurinol, 100 mg, oral, Daily  amLODIPine, 5 mg, oral, Daily  cefdinir, 300 mg, oral, Daily  cholecalciferol, 2,000 Units, oral, Daily  cyanocobalamin, 1,000 mcg, oral, Daily  melatonin, 3 mg, oral, Nightly  metoprolol tartrate, 25 mg, oral, BID  [START ON 4/19/2024] pantoprazole, 40 mg, oral, Daily before  breakfast   Or  [START ON 4/19/2024] pantoprazole, 40 mg, intravenous, Daily before breakfast  polyethylene glycol, 17 g, oral, Daily  pravastatin, 40 mg, oral, Nightly  pregabalin, 100 mg, oral, BID  sevelamer carbonate, 800 mg, oral, TID with meals      Continuous medications:     PRN medications:  PRN medications: acetaminophen, bisacodyl, guaiFENesin, ondansetron **OR** ondansetron      Past Medical History  She has a past medical history of Abnormal levels of other serum enzymes, Acute kidney failure (CMS-HCC), Anemia, CKD (chronic kidney disease), COPD (chronic obstructive pulmonary disease) (Multi), Disease of blood and blood-forming organs, unspecified, HLD (hyperlipidemia), MDS (myelodysplastic syndrome) (Multi), Personal history of other diseases of the musculoskeletal system and connective tissue, Personal history of other specified conditions, Personal history of other specified conditions, and Type 2 diabetes mellitus (Multi).    Surgical History  She has a past surgical history that includes Other surgical history (12/13/2019); Other surgical history (12/13/2019); Other surgical history (Bilateral, 12/13/2019); Other surgical history (Left, 12/13/2019); Other surgical history (12/13/2019); Other surgical history (12/13/2019); Other surgical history (Right, 12/13/2019); Other surgical history (04/16/2021); MR angio head wo IV contrast (11/20/2020); MR angio neck wo IV contrast (11/20/2020); Breast biopsy (Left); Hysterectomy; Breast lumpectomy; Appendectomy; Colonoscopy; and Oophorectomy.     Social History  She reports that she has never smoked. She has never used smokeless tobacco. She reports that she does not currently use alcohol. She reports that she does not use drugs.    Family History  No family history on file.     Allergies  Codeine, Hydrocodone, Hydrocodone-acetaminophen, Meperidine (pf), Tramadol, Piperacillin-tazobactam, Adhesive tape-silicones, and Meperidine    Code Status  Full Code      Review of Systems   Constitutional:  Negative for chills and fever.   Eyes:  Negative for photophobia and visual disturbance.   Respiratory:  Negative for cough and shortness of breath.    Cardiovascular:  Negative for chest pain, palpitations and leg swelling.   Gastrointestinal:  Positive for abdominal pain. Negative for abdominal distention, anal bleeding, blood in stool, constipation, diarrhea, nausea and vomiting.   Endocrine: Negative for polydipsia and polyuria.   Genitourinary:  Negative for decreased urine volume, dysuria, hematuria and urgency.   Musculoskeletal:  Positive for back pain.   Skin:  Negative for color change and rash.   Neurological:  Negative for dizziness, tremors, syncope, weakness and light-headedness.   Psychiatric/Behavioral:  Negative for confusion and sleep disturbance.    All other systems reviewed and are negative.    Last Recorded Vitals  /71   Pulse 76   Temp 36.7 °C (98 °F) (Temporal)   Resp 16   Wt 69.4 kg (153 lb)   SpO2 94%      Physical Exam:  Vital signs and nursing notes reviewed.   Constitutional: Pleasant and cooperative. Laying in bed in no acute distress. Conversant. Blood currently infusing.   Skin: Warm and dry; no obvious lesions, rashes, or jaundice. Slight pallor.   Eyes: EOMI. Anicteric sclera.   ENT: Mucous membranes moist; no obvious injury or deformity appreciated.   Head and Neck: Normocephalic, atraumatic. ROM preserved. Trachea midline. No appreciable JVD. No appreciable thyromegaly.   Respiratory: Nonlabored on RA. Lungs clear to auscultation bilaterally without obvious adventitious sounds. Chest rise is equal.  Cardiovascular: RRR. No gross murmur, gallop, or rub. Extremities are warm and well-perfused with good capillary refill (< 3 seconds). No chest wall tenderness.   Gastro: Abdomen soft, non-distended, +TTP RUQ and epigastric area (mild). No obvious organomegaly appreciated.   : No CVA tenderness.   MSK: No gross abnormalities  appreciated. No limitations to AROM/PROM appreciated.   Extremities: No cyanosis, edema, or clubbing evident. Neurovascularly intact.   Neuro: A&Ox3. CN 2-12 grossly intact. Able to respond to questions appropriately and clearly. No new focal neurologic deficits appreciated.  Psych: Appropriate mood and behavior.    Assessment/Plan   Principal Problem:    Abdominal pain  Active Problems:    Hypertension, essential    GERD (gastroesophageal reflux disease)    Hyperlipidemia    Myelodysplastic syndrome (Multi)    Type 2 diabetes mellitus with stage 4 chronic kidney disease, without long-term current use of insulin (Multi)    COPD without exacerbation (Multi)    Anemia, unspecified    ESRD (end stage renal disease) on dialysis (Multi)    Anemia due to chronic kidney disease, on chronic dialysis (Multi)    Elevated LFTs    Thickening of wall of gallbladder    79 y.o. female with PMHx s/f HTN, HLD, CAD, HFpEF, COPD (no baseline O2), NIDDM2, ESRD on HD, chronic anemia requiring Procrit and intermittent PRBC infusions (in setting of MDS and ESRD), OA, anxiety and depression, and GERD, presenting with sudden onset upper abdominal pain which wraps around to the right middle part of her back.    Upper abdominal pain with gallbladder wall thickening and elevated LFTs  -GB US: slightly thick-walled gallbladder containing partially shadowing stones and/or sludge with overlying tenderness and a reported positive Luna sign from the ultrasound technician  -Surgery consult appreciated --> NPO, planning for MRCP at this time  -Discussed with patient's nurse (currently being held in the ED until bed opens up) --> unless something changes with MRI schedule, planned for tonight  -Will hold on antibiotics at this time for this problem specifically as patient does not have a leukocytosis, fevers, other systemic signs of infection at this time  -Will continue supportive care in the interim. Monitor LFTs.     Acute on chronic anemia,  anemia secondary to ESRD and MDS  -Patient does require frequent blood transfusions and Procrit in the outpatient setting  -She has presented to the emergency department for blood transfusions multiple times  -She denies any evidence of bleeding as described above  -She is currently being transfused 1 unit PRBCs.  Will have a repeat H&H following that.    Recent CAP  -Patient is still on cefdinir for 3 more doses (took her dose this morning before coming into the hospital) for recent community-acquired pneumonia which was diagnosed during her last hospitalization  -Will continue the final doses of cefdinir  -She is not complaining of shortness of breath, ongoing cough, congestion, wheezing, etc.    HTN, HLD, CAD, Chronic HFpEF  -No current chest pain or anginal symptoms at examination time.  -ECG obtained in the ED was negative  -Patient will be continued on home therapies as appropriate, monitor on telemetry.  -Continued outpatient follow-up as scheduled    NIDDM-II   -Patient is n.p.o. until MRCP, then we will proceed with SSI ACHS unless surgical plan changes and then may need to adjust insulin dosing  -Hypoglycemic protocol, Accu-Cheks    ESRD on HD  -Nephrology consultation appreciated  -Continued on home medications    Anxiety and depression  -Continued on home medications    GERD  -PPI while admitted         Rosa Calabrese PA-C    Dragon dictation software was used to dictate this note and thus there may be minor errors in translation/transcription including garbled speech or misspellings. Please contact for clarification if needed.

## 2024-04-18 NOTE — PROGRESS NOTES
Tana Hargrove is a 79 y.o. female admitted for Abdominal pain. Pharmacy reviewed the patient's [id number] medications and allergies for accuracy.    The list below reflects the PTA list prior to pharmacy medication history. A summary a changes to the PTA medication list has been listed below. Please review each medication in order reconciliation for additional clarification and justification.    Source of information:  Discharge Paperwork 04/15/2024    No Changes!    Medications added:    Medications modified:    Medications to be removed:    Medications of concern:      Prior to Admission Medications   Prescriptions Last Dose Informant Patient Reported? Taking?   SITagliptin phosphate (Januvia) 25 mg tablet   No No   Sig: Take 1 tablet (25 mg) by mouth once daily.   allopurinol (Zyloprim) 100 mg tablet   No No   Sig: Take 1 tablet (100 mg) by mouth once daily.   amLODIPine (Norvasc) 5 mg tablet   Yes No   Sig: Take 1 tablet (5 mg) by mouth once daily.   cefdinir (Omnicef) 300 mg capsule   No No   Sig: Take 1 capsule (300 mg) by mouth 2 times a day for 4 days.   cholecalciferol (Vitamin D-3) 50 MCG (2000 UT) tablet   Yes No   Sig: Take 1 tablet (2,000 Units) by mouth once daily.   cyanocobalamin (Vitamin B-12) 1,000 mcg tablet   Yes No   Sig: Take 1 tablet (1,000 mcg) by mouth once daily.   metoprolol tartrate (Lopressor) 25 mg tablet   No No   Sig: Take 1 tablet (25 mg) by mouth 2 times a day.   pioglitazone (Actos) 15 mg tablet   No No   Sig: Take 1 tablet (15 mg) by mouth once daily.   pravastatin (Pravachol) 40 mg tablet   No No   Sig: Take 1 tablet (40 mg) by mouth once daily at bedtime.   pregabalin (Lyrica) 100 mg capsule   No No   Sig: TAKE ONE CAPSULE BY MOUTH TWICE DAILY @9AM & 5PM   sevelamer carbonate (Renvela) 800 mg tablet   No No   Sig: Take 1 tablet (800 mg) by mouth 3 times a day with meals. Swallow tablet whole; do not crush, break, or chew.      Facility-Administered Medications: None        Keena Flores

## 2024-04-18 NOTE — PROGRESS NOTES
Medication Adjustment    The following medication(s) was/were adjusted for Tana BERT Hargrove per protocol/policy due to altered renal function.    Medication(s) adjusted:   Cefdinir 300 mg adjusted to q24h based on crcl of 19.3 mL/min    Wan Ortega, RPh     Universal Safety Interventions

## 2024-04-18 NOTE — ED PROVIDER NOTES
HPI   Chief Complaint   Patient presents with    abdominal pain       Patient presents emergency department secondary to abdominal pain.  Patient had an episode of abdominal pain today.  She was here at the hospital visiting a friend.  This is when the abdominal pain came on.  It lasted about 15 minutes.  It has now resolved.  She currently has no pain.  No nausea or vomiting.  No change in bowel movements.  No change in urination.  She is a dialysis patient.  She gets dialysis on Monday Wednesday and Friday.  She had dialysis yesterday without difficulty.  She does still make urine.  She was recently admitted to the hospital after having abdominal and back pain.  At that time was found to have cholelithiasis and possible pneumonia.  She was discharged and doing well at home until the pain recurred today.  She has no other complaints at this time.  She actually is now symptom-free.                        Tasneem Coma Scale Score: 15                  Patient History   Past Medical History:   Diagnosis Date    Abnormal levels of other serum enzymes     Elevated liver enzymes    Acute kidney failure (CMS-HCC)     Anemia     CKD (chronic kidney disease)     COPD (chronic obstructive pulmonary disease) (Multi)     Disease of blood and blood-forming organs, unspecified     Bone marrow disorder    HLD (hyperlipidemia)     MDS (myelodysplastic syndrome) (Multi)     Personal history of other diseases of the musculoskeletal system and connective tissue     History of muscle pain    Personal history of other specified conditions     History of insomnia    Personal history of other specified conditions     History of edema    Type 2 diabetes mellitus (Multi)      Past Surgical History:   Procedure Laterality Date    APPENDECTOMY      BREAST BIOPSY Left     left excisional    BREAST LUMPECTOMY      COLONOSCOPY      HYSTERECTOMY      MR HEAD ANGIO WO IV CONTRAST  11/20/2020    MR HEAD ANGIO WO IV CONTRAST 11/20/2020 [facility] CL[facility]  [facility]    MR NECK ANGIO WO IV CONTRAST  11/20/2020    MR NECK ANGIO WO IV CONTRAST 11/20/2020 [facility] CLINICAL LEGACY    OOPHORECTOMY      OTHER SURGICAL HISTORY  12/13/2019    Oophorectomy bilateral    OTHER SURGICAL HISTORY  12/13/2019    Tubal ligation    OTHER SURGICAL HISTORY Bilateral 12/13/2019    Knee replacement    OTHER SURGICAL HISTORY Left 12/13/2019    Shoulder surgery    OTHER SURGICAL HISTORY  12/13/2019    Hysterectomy    OTHER SURGICAL HISTORY  12/13/2019    Lumpectomy    OTHER SURGICAL HISTORY Right 12/13/2019    Foot surgery    OTHER SURGICAL HISTORY  04/16/2021    Back surgery     No family history on file.  Social History     Tobacco Use    Smoking status: Never    Smokeless tobacco: Never   Vaping Use    Vaping status: Never Used   Substance Use Topics    Alcohol use: Not Currently    Drug use: Never       Physical Exam   ED Triage Vitals [04/18/24 1010]   Temperature Heart Rate Respirations BP   36.1 °C (97 °F) 61 18 134/68      Pulse Ox Temp Source Heart Rate Source Patient Position   95 % Temporal Monitor Sitting      BP Location FiO2 (%)     Left arm --       Physical Exam  Vitals and nursing note reviewed.   Constitutional:       Appearance: Normal appearance.   HENT:      Head: Normocephalic.      Nose: Nose normal.      Mouth/Throat:      Mouth: Mucous membranes are moist.   Eyes:      Extraocular Movements: Extraocular movements intact.      Pupils: Pupils are equal, round, and reactive to light.   Cardiovascular:      Rate and Rhythm: Normal rate and regular rhythm.      Pulses: Normal pulses.      Heart sounds: Normal heart sounds.   Pulmonary:      Effort: Pulmonary effort is normal.      Breath sounds: Normal breath sounds.   Abdominal:      General: Abdomen is flat. Bowel sounds are normal.      Palpations: Abdomen is soft.      Tenderness: There is abdominal tenderness. There is no guarding or rebound.      Comments: Minimal right upper quadrant tenderness.   Musculoskeletal:          General: Normal range of motion.   Skin:     General: Skin is warm and dry.      Capillary Refill: Capillary refill takes less than 2 seconds.   Neurological:      General: No focal deficit present.      Mental Status: She is alert and oriented to person, place, and time.   Psychiatric:         Mood and Affect: Mood normal.         Behavior: Behavior normal.       Labs Reviewed   URINALYSIS WITH REFLEX CULTURE AND MICROSCOPIC    Narrative:     The following orders were created for panel order Urinalysis with Reflex Culture and Microscopic.  Procedure                               Abnormality         Status                     ---------                               -----------         ------                     Urinalysis with Reflex C...[[id number]]                                                 Extra Urine Gray Tube[[id number]]                                                         Please view results for these tests on the individual orders.   CBC WITH AUTO DIFFERENTIAL   COMPREHENSIVE METABOLIC PANEL   LIPASE   URINALYSIS WITH REFLEX CULTURE AND MICROSCOPIC   EXTRA URINE GRAY TUBE     Pain Management Panel  More data exists         Latest Ref Rng & Units 4/16/2024 9/27/2022   Pain Management Panel   Amphetamine Screen, Urine Presumptive Negative Presumptive Negative  PRESUMPTIVE NEGATIVE    Barbiturate Screen, Urine Presumptive Negative Presumptive Negative  PRESUMPTIVE NEGATIVE    Benzodiazepines Screen, Urine Presumptive Negative Presumptive Negative  -   Codeine IgE Cutoff <50 ng/mL - <50    Fentanyl Screen, Urine Presumptive Negative Presumptive Negative  -   Hydromorphone Urine Cutoff <25 ng/mL - <25    Methadone Screen, Urine Presumptive Negative Presumptive Negative  -   Morphine  Cutoff <50 ng/mL - <50      No orders to display     ED Course & MDM   Diagnoses as of 04/18/24 1521   Calculus of gallbladder with cholecystitis without biliary obstruction, unspecified cholecystitis acuity   Anemia,  unspecified type       Medical Decision Making  Patient presents secondary to abdominal pain.  Abdominal pain resolved on its own.  Differential diagnosis for this patient is cholelithiasis, electrolyte abnormality, coronary disease, intra-abdominal gas or other acute cause.  Patient is evaluated in the emergency department with ultrasound of the right upper quadrant laboratory workup and EKG. patient's laboratory workup does show an anemia with a hemoglobin of 6.4.  Patient states that she has required blood transfusions in the past.  They do not know if she is losing blood but have not found a source of blood loss.  Patient was consented and given 1 unit of blood.  She denies any recent black or bloody stools.  Patient's ultrasound shows evidence of Mamta lithiasis with sludge and possible gallbladder wall thickening.  Patient also has elevation in liver enzymes which is new compared to most recent labs.  At this time patient was seen by surgery.  Surgery recommends admission for MRCP and transfusion.  Patient is discussed with the physician assistant on for the hospitalist group and will be admitted for further workup and evaluation.        Procedure  Critical Care    Performed by: Michell Perez MD  Authorized by: Michell Perez MD    Critical care provider statement:     Critical care time (minutes):  30    Critical care time was exclusive of:  Separately billable procedures and treating other patients    Critical care was necessary to treat or prevent imminent or life-threatening deterioration of the following conditions: anemia.    Critical care was time spent personally by me on the following activities:  Discussions with consultants, evaluation of patient's response to treatment, examination of patient, re-evaluation of patient's condition, ordering and review of radiographic studies and ordering and review of laboratory studies    Care discussed with: admitting provider         Michell TRUJILLO  MD Chris  04/18/24 1529       Michell Perez MD  04/30/24 2105

## 2024-04-18 NOTE — CARE PLAN
Problem: Pain - Adult  Goal: Verbalizes/displays adequate comfort level or baseline comfort level  Outcome: Progressing     Problem: Safety - Adult  Goal: Free from fall injury  Outcome: Progressing     Problem: Discharge Planning  Goal: Discharge to home or other facility with appropriate resources  Outcome: Progressing     Problem: Chronic Conditions and Co-morbidities  Goal: Patient's chronic conditions and co-morbidity symptoms are monitored and maintained or improved  Outcome: Progressing     Problem: Diabetes  Goal: Achieve decreasing blood glucose levels by end of shift  Outcome: Progressing  Goal: Increase stability of blood glucose readings by end of shift  Outcome: Progressing  Goal: Decrease in ketones present in urine by end of shift  Outcome: Progressing  Goal: Maintain electrolyte levels within acceptable range throughout shift  Outcome: Progressing  Goal: Maintain glucose levels >70mg/dl to <250mg/dl throughout shift  Outcome: Progressing  Goal: No changes in neurological exam by end of shift  Outcome: Progressing  Goal: Learn about and adhere to nutrition recommendations by end of shift  Outcome: Progressing  Goal: Vital signs within normal range for age by end of shift  Outcome: Progressing  Goal: Increase self care and/or family involovement by end of shift  Outcome: Progressing  Goal: Receive DSME education by end of shift  Outcome: Progressing     The patient's goals for the shift include      The clinical goals for the shift include have pain management during shift    Over the shift, the patient did not make progress toward the following goals.

## 2024-04-18 NOTE — CONSULTS
GENERAL SURGERY CONSULTATION NOTE    Tana Hargrove   1945   [id number]     Consults    Reason For Consult  Abdominal pain    History Of Present Illness  Tana Hargrove is a 79 y.o. female with history of HTN, HLD, CAD, HFpEF, COPD (no baseline O2), NIDDM2, CKD , chronic anemia requiring Procrit and intermittent PRBC infusions (likely in setting of myelodysplastic disease and CKD), OA, anxiety, depression and GERD.  presenting with sudden onset right mid back pain and banding upper abdominal pain. She was recently seen on 4/13 for similar issues and had gallbladder work up. At that time her US was inconclusive, LFTs were wnl, no leukocytosis noted, and she reported all her symptoms were resolved. She was then discharged. Today she was in the hospital seeing a friend and the pain suddenly started again. She states she has not had any nausea, vomiting, fevers, chills, changes on bowel or bladder habits. She does report eating a bagel with cream cheese this morning before coming to se her friend. At this time though she reports her symptoms have resolved. General surgery was consulted for re evaluation of her gallbladder given new elevated lfts.     Past Medical History  Past Medical History:   Diagnosis Date    Abnormal levels of other serum enzymes     Elevated liver enzymes    Acute kidney failure (CMS-HCC)     Anemia     CKD (chronic kidney disease)     COPD (chronic obstructive pulmonary disease) (Multi)     Disease of blood and blood-forming organs, unspecified     Bone marrow disorder    HLD (hyperlipidemia)     MDS (myelodysplastic syndrome) (Multi)     Personal history of other diseases of the musculoskeletal system and connective tissue     History of muscle pain    Personal history of other specified conditions     History of insomnia    Personal history of other specified conditions     History of edema    Type 2 diabetes mellitus (Multi)        Surgical History  Past Surgical History:   Procedure  Laterality Date    APPENDECTOMY      BREAST BIOPSY Left     left excisional    BREAST LUMPECTOMY      COLONOSCOPY      HYSTERECTOMY      MR HEAD ANGIO WO IV CONTRAST  11/20/2020    MR HEAD ANGIO WO IV CONTRAST 11/20/2020 [facility] CLINICAL LEGACY    MR NECK ANGIO WO IV CONTRAST  11/20/2020    MR NECK ANGIO WO IV CONTRAST 11/20/2020 [facility] CLINICAL LEGAC[facility]    OOPHORECTOMY      OTHER SURGICAL HISTORY  12/13/2019    Oophorectomy bilateral    OTHER SURGICAL HISTORY  12/13/2019    Tubal ligation    OTHER SURGICAL HISTORY Bilateral 12/13/2019    Knee replacement    OTHER SURGICAL HISTORY Left 12/13/2019    Shoulder surgery    OTHER SURGICAL HISTORY  12/13/2019    Hysterectomy    OTHER SURGICAL HISTORY  12/13/2019    Lumpectomy    OTHER SURGICAL HISTORY Right 12/13/2019    Foot surgery    OTHER SURGICAL HISTORY  04/16/2021    Back surgery       Medications  No current facility-administered medications on file prior to encounter.     Current Outpatient Medications on File Prior to Encounter   Medication Sig Dispense Refill    allopurinol (Zyloprim) 100 mg tablet Take 1 tablet (100 mg) by mouth once daily. 90 tablet 1    amLODIPine (Norvasc) 5 mg tablet Take 1 tablet (5 mg) by mouth once daily.      cefdinir (Omnicef) 300 mg capsule Take 1 capsule (300 mg) by mouth 2 times a day for 4 days. 8 capsule 0    cholecalciferol (Vitamin D-3) 50 MCG (2000 UT) tablet Take 1 tablet (2,000 Units) by mouth once daily.      cyanocobalamin (Vitamin B-12) 1,000 mcg tablet Take 1 tablet (1,000 mcg) by mouth once daily.      metoprolol tartrate (Lopressor) 25 mg tablet Take 1 tablet (25 mg) by mouth 2 times a day. 60 tablet 3    pioglitazone (Actos) 15 mg tablet Take 1 tablet (15 mg) by mouth once daily. 30 tablet 3    pravastatin (Pravachol) 40 mg tablet Take 1 tablet (40 mg) by mouth once daily at bedtime. 90 tablet 1    pregabalin (Lyrica) 100 mg capsule TAKE ONE CAPSULE BY MOUTH TWICE DAILY @9AM & 5PM 60 capsule 0    sevelamer carbonate  (Renvela) 800 mg tablet Take 1 tablet (800 mg) by mouth 3 times a day with meals. Swallow tablet whole; do not crush, break, or chew. 30 tablet 1    SITagliptin phosphate (Januvia) 25 mg tablet Take 1 tablet (25 mg) by mouth once daily. 30 tablet 3    [DISCONTINUED] epoetin alejandra (Procrit) 10,000 unit/mL injection Pt gets every Monday.      [DISCONTINUED] metoprolol tartrate (Lopressor) 25 mg tablet Take 1 tablet (25 mg) by mouth once daily. 30 tablet 11    [DISCONTINUED] ondansetron ODT (Zofran-ODT) 4 mg disintegrating tablet Take 2 tablets (8 mg) by mouth every 8 hours if needed for nausea or vomiting. (Patient not taking: Reported on 4/16/2024) 20 tablet 0    [DISCONTINUED] oxygen (O2) gas therapy Inhale 3 L/min once every 24 hours. (Patient not taking: Reported on 4/16/2024)      [DISCONTINUED] pioglitazone (Actos) 15 mg tablet TAKE 1 TABLET BY MOUTH ONCE DAILY 90 tablet 0    [DISCONTINUED] pravastatin (Pravachol) 40 mg tablet TAKE 1 TABLET BY MOUTH ONCE DAILY AT BEDTIME 90 tablet 0    [DISCONTINUED] pregabalin (Lyrica) 100 mg capsule TAKE ONE CAPSULE BY MOUTH TWICE DAILY @9AM & 5PM 6 capsule 0    [DISCONTINUED] sennosides-docusate sodium (Ana-Colace) 8.6-50 mg tablet Take 2 tablets by mouth 2 times a day as needed for constipation. (Patient not taking: Reported on 4/16/2024) 30 tablet 0    [DISCONTINUED] SITagliptin phosphate (Januvia) 25 mg tablet Take 1 tablet (25 mg) by mouth once daily. Do not start before March 6, 2024. 30 tablet 1       Allergies  Codeine, Hydrocodone, Hydrocodone-acetaminophen, Meperidine (pf), Tramadol, Piperacillin-tazobactam, Adhesive tape-silicones, and Meperidine     Social History  She reports that she has never smoked. She has never used smokeless tobacco. She reports that she does not currently use alcohol. She reports that she does not use drugs.    Family History  No family history on file.     Review of Systems   Constitutional:  Negative for appetite change, chills, fatigue  "and fever.   Respiratory:  Negative for cough, chest tightness and shortness of breath.    Cardiovascular:  Negative for chest pain and palpitations.   Gastrointestinal:  Positive for abdominal pain. Negative for abdominal distention, constipation, diarrhea, nausea and vomiting.   Genitourinary:  Negative for difficulty urinating, enuresis and flank pain.   Musculoskeletal:  Positive for back pain. Negative for arthralgias.   Skin:  Negative for color change, rash and wound.   Neurological:  Negative for dizziness, light-headedness and headaches.       Last Recorded Vitals  Blood pressure 154/67, pulse 76, temperature 36.1 °C (97 °F), temperature source Temporal, resp. rate 18, height 1.549 m (5' 1\"), weight 69.4 kg (153 lb), SpO2 95%.     Physical Exam  Vitals reviewed.   Constitutional:       General: She is not in acute distress.     Comments: Elderly, frail, pale   HENT:      Head: Normocephalic and atraumatic.      Mouth/Throat:      Mouth: Mucous membranes are moist.      Pharynx: Oropharynx is clear.   Eyes:      Conjunctiva/sclera: Conjunctivae normal.      Pupils: Pupils are equal, round, and reactive to light.   Cardiovascular:      Rate and Rhythm: Normal rate.      Pulses: Normal pulses.   Pulmonary:      Effort: Pulmonary effort is normal. No respiratory distress.   Chest:      Chest wall: No tenderness.   Abdominal:      Comments: Abdomen soft, mild epigastric and ruq tenderness to palpation, negative Luna's sign, non distended, non tympanic, non peritonitic.   Skin:     General: Skin is warm and dry.      Capillary Refill: Capillary refill takes less than 2 seconds.   Neurological:      General: No focal deficit present.      Mental Status: She is alert and oriented to person, place, and time.          Relevant Results:  Labs:  Results for orders placed or performed during the hospital encounter of 04/18/24 (from the past 24 hour(s))   ECG 12 lead   Result Value Ref Range    Ventricular Rate 71 BPM "    Atrial Rate 70 BPM    MS Interval 180 ms    QRS Duration 91 ms    QT Interval 456 ms    QTC Calculation(Bazett) 496 ms    P Axis 46 degrees    R Axis 6 degrees    T Axis 88 degrees    QRS Count 11 beats    Q Onset 249 ms    T Offset 477 ms    QTC Fredericia 482 ms   CBC and Auto Differential   Result Value Ref Range    WBC 5.0 4.4 - 11.3 x10*3/uL    nRBC 0.0 0.0 - 0.0 /100 WBCs    RBC 2.02 (L) 4.00 - 5.20 x10*6/uL    Hemoglobin 6.4 (LL) 12.0 - 16.0 g/dL    Hematocrit 20.4 (L) 36.0 - 46.0 %     (H) 80 - 100 fL    MCH 31.7 26.0 - 34.0 pg    MCHC 31.4 (L) 32.0 - 36.0 g/dL    RDW 23.3 (H) 11.5 - 14.5 %    Platelets 203 150 - 450 x10*3/uL    Neutrophils % 65.6 40.0 - 80.0 %    Immature Granulocytes %, Automated 0.4 0.0 - 0.9 %    Lymphocytes % 20.0 13.0 - 44.0 %    Monocytes % 11.0 2.0 - 10.0 %    Eosinophils % 2.6 0.0 - 6.0 %    Basophils % 0.4 0.0 - 2.0 %    Neutrophils Absolute 3.29 1.60 - 5.50 x10*3/uL    Immature Granulocytes Absolute, Automated 0.02 0.00 - 0.50 x10*3/uL    Lymphocytes Absolute 1.00 0.80 - 3.00 x10*3/uL    Monocytes Absolute 0.55 0.05 - 0.80 x10*3/uL    Eosinophils Absolute 0.13 0.00 - 0.40 x10*3/uL    Basophils Absolute 0.02 0.00 - 0.10 x10*3/uL   Comprehensive metabolic panel   Result Value Ref Range    Glucose 185 (H) 74 - 99 mg/dL    Sodium 136 136 - 145 mmol/L    Potassium 3.8 3.5 - 5.3 mmol/L    Chloride 99 98 - 107 mmol/L    Bicarbonate 31 21 - 32 mmol/L    Anion Gap 10 10 - 20 mmol/L    Urea Nitrogen 24 (H) 6 - 23 mg/dL    Creatinine 2.07 (H) 0.50 - 1.05 mg/dL    eGFR 24 (L) >60 mL/min/1.73m*2    Calcium 9.2 8.6 - 10.3 mg/dL    Albumin 3.7 3.4 - 5.0 g/dL    Alkaline Phosphatase 373 (H) 33 - 136 U/L    Total Protein 6.1 (L) 6.4 - 8.2 g/dL    AST 90 (H) 9 - 39 U/L    Bilirubin, Total 0.7 0.0 - 1.2 mg/dL    ALT 77 (H) 7 - 45 U/L   Lipase   Result Value Ref Range    Lipase 43 9 - 82 U/L   Morphology   Result Value Ref Range    RBC Morphology See Below     Hypochromia Mild      Basophilic Stippling Present    Type and Screen   Result Value Ref Range    ABO TYPE A     Rh TYPE POS     ANTIBODY SCREEN NEG    Urinalysis with Reflex Culture and Microscopic   Result Value Ref Range    Color, Urine Yellow Straw, Yellow    Appearance, Urine Clear Clear    Specific Gravity, Urine 1.006 1.005 - 1.035    pH, Urine 7.0 5.0, 5.5, 6.0, 6.5, 7.0, 7.5, 8.0    Protein, Urine 100 (2+) (N) NEGATIVE mg/dL    Glucose, Urine 50 (1+) (A) NEGATIVE mg/dL    Blood, Urine NEGATIVE NEGATIVE    Ketones, Urine NEGATIVE NEGATIVE mg/dL    Bilirubin, Urine NEGATIVE NEGATIVE    Urobilinogen, Urine <2.0 <2.0 mg/dL    Nitrite, Urine NEGATIVE NEGATIVE    Leukocyte Esterase, Urine NEGATIVE NEGATIVE   Urinalysis Microscopic   Result Value Ref Range    WBC, Urine 1-5 1-5, NONE /HPF    RBC, Urine NONE NONE, 1-2, 3-5 /HPF    Squamous Epithelial Cells, Urine 1-9 (SPARSE) Reference range not established. /HPF     *Note: Due to a large number of results and/or encounters for the requested time period, some results have not been displayed. A complete set of results can be found in Results Review.       Imaging:  ECG 12 lead  Sinus rhythm  Nonspecific T abnrm, anterolateral leads  Borderline prolonged QT interval    See ED provider note for full interpretation and clinical correlation  Confirmed by Terri Bermudez ([id number]) on 4/18/2024 1:39:30 PM  US gallbladder  Narrative: Interpreted By:  Kalpana Izaguirre,   STUDY:  US GALLBLADDER;  4/18/2024 1:01 pm      INDICATION:  Signs/Symptoms:abdominal painBowel.      COMPARISON:  04/13/2024      ACCESSION NUMBER(S):  [id number]      ORDERING CLINICIAN:  JESÚS GALLOWAY      TECHNIQUE:  Multiple images of the right upper quadrant were obtained.      FINDINGS:  LIVER:  Normal size and echogenicity. No focal lesion demonstrated.      GALLBLADDER:   Normally distended. Contains partially shadowing  gallstones and sludge. Mild anterior wall thickening at 3.8 mm.  Sonographic Luna sign is  reportedly positive.      BILIARY TREE: The common duct measures  2 mm.      PANCREAS:   Partially obscured by overlying bowel gas.  Visualized  portions within normal limits.      RIGHT KIDNEY: Increased echogenicity. Normal in size. Mild  hydronephrosis. 1 cm cyst in the midpole.      Impression: Slightly thick-walled gallbladder containing partially shadowing  stones and/or sludge with overlying tenderness. Findings are  consistent with acute cholecystitis. Clinical correlation recommended.      Mild right hydronephrosis. Small right renal cyst. Increased  echogenicity of the right kidney consistent with medical renal  disease.      MACRO:  None.      Signed by: Kalpana Izaguirre 4/18/2024 1:35 PM  Dictation workstation:   [id number]      Assessment and Plan  Active Problems:  There are no active Hospital Problems.    79 y.o. female with upper abdominal pain, anemia, elevated LFTs and mild gallbladder wall thickening seen on ultrasound  - Transfuse for hgb < 7, patient known to have chronic anemia  - US showed wall thickening w/o pericholecystic fluid, and leukocyte count wnl but given elevated LFTs will obtain MRCP to evaluate for choledocholithiasis  -Maintain NPO for imaging study  - Medical admission for anemia and weakness  - Surgery will continue to follow    Discussed with attending Dr. Kenyetta Burgess, DO - PGY2  General Surgery

## 2024-04-19 ENCOUNTER — APPOINTMENT (OUTPATIENT)
Dept: DIALYSIS | Facility: HOSPITAL | Age: 79
End: 2024-04-19
Payer: MEDICARE

## 2024-04-19 VITALS
RESPIRATION RATE: 18 BRPM | BODY MASS INDEX: 28.3 KG/M2 | TEMPERATURE: 98.4 F | HEIGHT: 61 IN | SYSTOLIC BLOOD PRESSURE: 146 MMHG | DIASTOLIC BLOOD PRESSURE: 72 MMHG | WEIGHT: 149.91 LBS | HEART RATE: 68 BPM | OXYGEN SATURATION: 92 %

## 2024-04-19 PROBLEM — R10.9 ABDOMINAL PAIN: Status: RESOLVED | Noted: 2024-04-18 | Resolved: 2024-04-19

## 2024-04-19 PROBLEM — J44.9 COPD WITHOUT EXACERBATION (MULTI): Chronic | Status: RESOLVED | Noted: 2023-09-02 | Resolved: 2024-04-19

## 2024-04-19 LAB
ALBUMIN SERPL BCP-MCNC: 3.4 G/DL (ref 3.4–5)
ALP SERPL-CCNC: 292 U/L (ref 33–136)
ALT SERPL W P-5'-P-CCNC: 58 U/L (ref 7–45)
ANION GAP SERPL CALC-SCNC: 13 MMOL/L (ref 10–20)
AST SERPL W P-5'-P-CCNC: 38 U/L (ref 9–39)
BASOPHILS # BLD AUTO: 0.02 X10*3/UL (ref 0–0.1)
BASOPHILS NFR BLD AUTO: 0.5 %
BILIRUB SERPL-MCNC: 0.6 MG/DL (ref 0–1.2)
BUN SERPL-MCNC: 27 MG/DL (ref 6–23)
CALCIUM SERPL-MCNC: 9.6 MG/DL (ref 8.6–10.3)
CHLORIDE SERPL-SCNC: 103 MMOL/L (ref 98–107)
CO2 SERPL-SCNC: 29 MMOL/L (ref 21–32)
CREAT SERPL-MCNC: 2.18 MG/DL (ref 0.5–1.05)
EGFRCR SERPLBLD CKD-EPI 2021: 22 ML/MIN/1.73M*2
EOSINOPHIL # BLD AUTO: 0.21 X10*3/UL (ref 0–0.4)
EOSINOPHIL NFR BLD AUTO: 5.3 %
ERYTHROCYTE [DISTWIDTH] IN BLOOD BY AUTOMATED COUNT: 23.9 % (ref 11.5–14.5)
GLUCOSE SERPL-MCNC: 104 MG/DL (ref 74–99)
HCT VFR BLD AUTO: 21.9 % (ref 36–46)
HGB BLD-MCNC: 7 G/DL (ref 12–16)
HOLD SPECIMEN: NORMAL
HYPOCHROMIA BLD QL SMEAR: NORMAL
IMM GRANULOCYTES # BLD AUTO: 0.01 X10*3/UL (ref 0–0.5)
IMM GRANULOCYTES NFR BLD AUTO: 0.3 % (ref 0–0.9)
LYMPHOCYTES # BLD AUTO: 1.56 X10*3/UL (ref 0.8–3)
LYMPHOCYTES NFR BLD AUTO: 39.3 %
MAGNESIUM SERPL-MCNC: 2 MG/DL (ref 1.6–2.4)
MCH RBC QN AUTO: 31.1 PG (ref 26–34)
MCHC RBC AUTO-ENTMCNC: 32 G/DL (ref 32–36)
MCV RBC AUTO: 97 FL (ref 80–100)
MONOCYTES # BLD AUTO: 0.39 X10*3/UL (ref 0.05–0.8)
MONOCYTES NFR BLD AUTO: 9.8 %
NEUTROPHILS # BLD AUTO: 1.78 X10*3/UL (ref 1.6–5.5)
NEUTROPHILS NFR BLD AUTO: 44.8 %
NRBC BLD-RTO: 0 /100 WBCS (ref 0–0)
OVALOCYTES BLD QL SMEAR: NORMAL
PLATELET # BLD AUTO: 206 X10*3/UL (ref 150–450)
POTASSIUM SERPL-SCNC: 3.9 MMOL/L (ref 3.5–5.3)
PROT SERPL-MCNC: 5.8 G/DL (ref 6.4–8.2)
RBC # BLD AUTO: 2.25 X10*6/UL (ref 4–5.2)
RBC MORPH BLD: NORMAL
SODIUM SERPL-SCNC: 141 MMOL/L (ref 136–145)
WBC # BLD AUTO: 4 X10*3/UL (ref 4.4–11.3)

## 2024-04-19 PROCEDURE — 2500000006 HC RX 250 W HCPCS SELF ADMINISTERED DRUGS (ALT 637 FOR ALL PAYERS): Performed by: STUDENT IN AN ORGANIZED HEALTH CARE EDUCATION/TRAINING PROGRAM

## 2024-04-19 PROCEDURE — 8010000001 HC DIALYSIS - HEMODIALYSIS PER DAY

## 2024-04-19 PROCEDURE — G0378 HOSPITAL OBSERVATION PER HR: HCPCS

## 2024-04-19 PROCEDURE — 83735 ASSAY OF MAGNESIUM: CPT | Performed by: STUDENT IN AN ORGANIZED HEALTH CARE EDUCATION/TRAINING PROGRAM

## 2024-04-19 PROCEDURE — 84075 ASSAY ALKALINE PHOSPHATASE: CPT | Performed by: STUDENT IN AN ORGANIZED HEALTH CARE EDUCATION/TRAINING PROGRAM

## 2024-04-19 PROCEDURE — 2500000004 HC RX 250 GENERAL PHARMACY W/ HCPCS (ALT 636 FOR OP/ED): Performed by: INTERNAL MEDICINE

## 2024-04-19 PROCEDURE — 96367 TX/PROPH/DG ADDL SEQ IV INF: CPT | Performed by: HOSPITALIST

## 2024-04-19 PROCEDURE — 85025 COMPLETE CBC W/AUTO DIFF WBC: CPT | Performed by: STUDENT IN AN ORGANIZED HEALTH CARE EDUCATION/TRAINING PROGRAM

## 2024-04-19 PROCEDURE — 99233 SBSQ HOSP IP/OBS HIGH 50: CPT | Performed by: SURGERY

## 2024-04-19 PROCEDURE — 99239 HOSP IP/OBS DSCHRG MGMT >30: CPT | Performed by: INTERNAL MEDICINE

## 2024-04-19 PROCEDURE — 2500000001 HC RX 250 WO HCPCS SELF ADMINISTERED DRUGS (ALT 637 FOR MEDICARE OP): Performed by: STUDENT IN AN ORGANIZED HEALTH CARE EDUCATION/TRAINING PROGRAM

## 2024-04-19 PROCEDURE — 2500000004 HC RX 250 GENERAL PHARMACY W/ HCPCS (ALT 636 FOR OP/ED): Performed by: SURGERY

## 2024-04-19 PROCEDURE — 96365 THER/PROPH/DIAG IV INF INIT: CPT | Performed by: HOSPITALIST

## 2024-04-19 PROCEDURE — 36415 COLL VENOUS BLD VENIPUNCTURE: CPT | Performed by: STUDENT IN AN ORGANIZED HEALTH CARE EDUCATION/TRAINING PROGRAM

## 2024-04-19 PROCEDURE — 2500000004 HC RX 250 GENERAL PHARMACY W/ HCPCS (ALT 636 FOR OP/ED): Performed by: STUDENT IN AN ORGANIZED HEALTH CARE EDUCATION/TRAINING PROGRAM

## 2024-04-19 RX ORDER — AMOXICILLIN AND CLAVULANATE POTASSIUM 875; 125 MG/1; MG/1
1 TABLET, FILM COATED ORAL 2 TIMES DAILY
Qty: 20 TABLET | Refills: 0 | Status: SHIPPED | OUTPATIENT
Start: 2024-04-19 | End: 2024-04-29

## 2024-04-19 RX ORDER — HEPARIN SODIUM 1000 [USP'U]/ML
1600 INJECTION, SOLUTION INTRAVENOUS; SUBCUTANEOUS
Status: DISCONTINUED | OUTPATIENT
Start: 2024-04-19 | End: 2024-04-19 | Stop reason: HOSPADM

## 2024-04-19 RX ORDER — METRONIDAZOLE 500 MG/100ML
500 INJECTION, SOLUTION INTRAVENOUS EVERY 8 HOURS
Status: DISCONTINUED | OUTPATIENT
Start: 2024-04-19 | End: 2024-04-19

## 2024-04-19 RX ORDER — LEVOFLOXACIN 5 MG/ML
250 INJECTION, SOLUTION INTRAVENOUS DAILY
Status: DISCONTINUED | OUTPATIENT
Start: 2024-04-19 | End: 2024-04-19

## 2024-04-19 RX ADMIN — AMPICILLIN SODIUM AND SULBACTAM SODIUM 3 G: 2; 1 INJECTION, POWDER, FOR SOLUTION INTRAMUSCULAR; INTRAVENOUS at 13:59

## 2024-04-19 RX ADMIN — METOPROLOL TARTRATE 25 MG: 25 TABLET, FILM COATED ORAL at 08:41

## 2024-04-19 RX ADMIN — METRONIDAZOLE 500 MG: 500 INJECTION, SOLUTION INTRAVENOUS at 08:41

## 2024-04-19 RX ADMIN — AMLODIPINE BESYLATE 5 MG: 5 TABLET ORAL at 08:41

## 2024-04-19 RX ADMIN — HEPARIN SODIUM 1600 UNITS: 1000 INJECTION INTRAVENOUS; SUBCUTANEOUS at 13:00

## 2024-04-19 RX ADMIN — ALLOPURINOL 100 MG: 100 TABLET ORAL at 08:41

## 2024-04-19 RX ADMIN — PANTOPRAZOLE SODIUM 40 MG: 40 TABLET, DELAYED RELEASE ORAL at 06:14

## 2024-04-19 RX ADMIN — PREGABALIN 100 MG: 50 CAPSULE ORAL at 08:41

## 2024-04-19 RX ADMIN — Medication 1000 MCG: at 08:41

## 2024-04-19 RX ADMIN — HEPARIN SODIUM 1600 UNITS: 1000 INJECTION INTRAVENOUS; SUBCUTANEOUS at 13:54

## 2024-04-19 RX ADMIN — POLYETHYLENE GLYCOL 3350 17 G: 17 POWDER, FOR SOLUTION ORAL at 08:41

## 2024-04-19 RX ADMIN — Medication 2000 UNITS: at 08:41

## 2024-04-19 RX ADMIN — SEVELAMER CARBONATE 800 MG: 800 TABLET, FILM COATED ORAL at 08:41

## 2024-04-19 ASSESSMENT — PAIN SCALES - GENERAL: PAINLEVEL_OUTOF10: 0 - NO PAIN

## 2024-04-19 ASSESSMENT — PAIN - FUNCTIONAL ASSESSMENT
PAIN_FUNCTIONAL_ASSESSMENT: 0-10
PAIN_FUNCTIONAL_ASSESSMENT: NO/DENIES PAIN

## 2024-04-19 ASSESSMENT — COGNITIVE AND FUNCTIONAL STATUS - GENERAL
DAILY ACTIVITIY SCORE: 24
MOBILITY SCORE: 22
WALKING IN HOSPITAL ROOM: A LITTLE
CLIMB 3 TO 5 STEPS WITH RAILING: A LITTLE

## 2024-04-19 ASSESSMENT — ENCOUNTER SYMPTOMS
SHORTNESS OF BREATH: 0
ABDOMINAL PAIN: 0
CONFUSION: 0
CHEST TIGHTNESS: 0
DIARRHEA: 0
ARTHRALGIAS: 1
NAUSEA: 0
VOMITING: 0
FEVER: 0

## 2024-04-19 NOTE — PROGRESS NOTES
Physical Therapy                 Therapy Communication Note    Patient Name: Tana Hargrove  MRN: [id number]  Today's Date: 4/19/2024     Discipline: Physical Therapy    Missed Visit Reason: Missed Visit Reason: Patient in a medical procedure (at dialysis)    Missed Time: Attempt    Comment:

## 2024-04-19 NOTE — PROGRESS NOTES
"    GENERAL SURGERY / TRAUMA PROGRESS NOTE    Patient: Tana Hargrove  Room: 3326/3326-A    Age: 79 y.o.   Gender: female  Attending: Kate Edmond MD    MRN: [id number]  Admission Date: 2024    PCP: Carlos Higgins MD       Tana Hargrove is a 79 y.o. female on day 0 of admission presenting with Abdominal pain.    SUBJECTIVE   Interval History:  No abdominal pain overnight.  Tolerating diet this morning without nausea, vomiting or recurrent abdominal pain.  MRCP last night shows no evidence of choledocholithiasis, but there is slightly thickened gallbladder wall with mild enhancement.  No fevers overnight.  Patient asking to be discharged as she wants to go to her sisters  over the weekend in [place].    ROS  Review of Systems   Constitutional:  Negative for fever.   Respiratory:  Negative for chest tightness and shortness of breath.    Gastrointestinal:  Negative for abdominal pain, diarrhea, nausea and vomiting.   Musculoskeletal:  Positive for arthralgias.   Psychiatric/Behavioral:  Negative for confusion.           OBJECTIVE   Last Recorded Vitals  Blood pressure 146/72, pulse 68, temperature 36.9 °C (98.4 °F), temperature source Temporal, resp. rate 18, height 1.549 m (5' 0.98\"), weight 68 kg (149 lb 14.6 oz), SpO2 92%.    Intake/Output last 3 Shifts:  I/O last 3 completed shifts:  In: 350 (5.1 mL/kg) [Blood:350]  Out: 200 (2.9 mL/kg) [Urine:200 (0.1 mL/kg/hr)]  Weight: 68 kg     PHYSICAL EXAM  Physical Exam   General: Well-developed, well-nourished and in no acute distress.  Head: Normocephalic. Atraumatic.  Neck/thyroid: Neck is supple.   Eyes: Pupils equal round and reactive to light. Conjunctiva normal.  ENMT: No masses or deformity of external nose. External ears without masses.  Respiratory/Chest:  Normal respiratory effort.  Cardiovascular: Regular rate and rhythm.   Abdomen: Soft and nondistended.  No point tenderness.  Absence of Luna sign.  Musculoskeletal: Joints and limbs are " grossly normal. Normal range of motion of major joints.  Neuro: Oriented to person, place and time.   Psych: Normal mood and affect.    RESULTS   Labs  Results for orders placed or performed during the hospital encounter of 04/18/24 (from the past 24 hour(s))   Hemoglobin and hematocrit, blood   Result Value Ref Range    Hemoglobin 8.2 (L) 12.0 - 16.0 g/dL    Hematocrit 24.8 (L) 36.0 - 46.0 %   CBC and Auto Differential   Result Value Ref Range    WBC 4.0 (L) 4.4 - 11.3 x10*3/uL    nRBC 0.0 0.0 - 0.0 /100 WBCs    RBC 2.25 (L) 4.00 - 5.20 x10*6/uL    Hemoglobin 7.0 (L) 12.0 - 16.0 g/dL    Hematocrit 21.9 (L) 36.0 - 46.0 %    MCV 97 80 - 100 fL    MCH 31.1 26.0 - 34.0 pg    MCHC 32.0 32.0 - 36.0 g/dL    RDW 23.9 (H) 11.5 - 14.5 %    Platelets 206 150 - 450 x10*3/uL    Neutrophils % 44.8 40.0 - 80.0 %    Immature Granulocytes %, Automated 0.3 0.0 - 0.9 %    Lymphocytes % 39.3 13.0 - 44.0 %    Monocytes % 9.8 2.0 - 10.0 %    Eosinophils % 5.3 0.0 - 6.0 %    Basophils % 0.5 0.0 - 2.0 %    Neutrophils Absolute 1.78 1.60 - 5.50 x10*3/uL    Immature Granulocytes Absolute, Automated 0.01 0.00 - 0.50 x10*3/uL    Lymphocytes Absolute 1.56 0.80 - 3.00 x10*3/uL    Monocytes Absolute 0.39 0.05 - 0.80 x10*3/uL    Eosinophils Absolute 0.21 0.00 - 0.40 x10*3/uL    Basophils Absolute 0.02 0.00 - 0.10 x10*3/uL   Comprehensive Metabolic Panel   Result Value Ref Range    Glucose 104 (H) 74 - 99 mg/dL    Sodium 141 136 - 145 mmol/L    Potassium 3.9 3.5 - 5.3 mmol/L    Chloride 103 98 - 107 mmol/L    Bicarbonate 29 21 - 32 mmol/L    Anion Gap 13 10 - 20 mmol/L    Urea Nitrogen 27 (H) 6 - 23 mg/dL    Creatinine 2.18 (H) 0.50 - 1.05 mg/dL    eGFR 22 (L) >60 mL/min/1.73m*2    Calcium 9.6 8.6 - 10.3 mg/dL    Albumin 3.4 3.4 - 5.0 g/dL    Alkaline Phosphatase 292 (H) 33 - 136 U/L    Total Protein 5.8 (L) 6.4 - 8.2 g/dL    AST 38 9 - 39 U/L    Bilirubin, Total 0.6 0.0 - 1.2 mg/dL    ALT 58 (H) 7 - 45 U/L   Magnesium   Result Value Ref Range     Magnesium 2.00 1.60 - 2.40 mg/dL   Morphology   Result Value Ref Range    RBC Morphology See Below     Hypochromia Mild     Ovalocytes Few      *Note: Due to a large number of results and/or encounters for the requested time period, some results have not been displayed. A complete set of results can be found in Results Review.       Radiology Resutls  MR abdomen w and wo IV contrast MRCP    Result Date: 4/18/2024  Interpreted By:  Alon Ga, STUDY: MRI of the  right    without contrast dated  4/18/2024.   INDICATION: Gallstones and abdominal pain   COMPARISON: Comparison recent CT and ultrasound.     ACCESSION NUMBER(S): [id number]   ORDERING CLINICIAN: MAHOGANY LEIVA   TECHNIQUE: Multiplanar multisequence MRI of the  abdomen is performed.. Pre and post contrast imaging performed.   FINDINGS: Hepatic steatosis. Spleen once again appears enlarged. Robust vascular calcification. No pancreatic duct dilatation. There are couple of pancreatic cysts with the largest measuring up to 6 mm   Lumbar spine instrumentation does cause associated artifact.   There are couple of renal cysts not concerning by imaging criteria. Minimal prominence of the collecting system.   Hepatic steatosis. There is gallbladder wall thickening and enhancement acute cholecystitis can present similarly. Sludge and stone seen within the lumen of the gallbladder also causing some artifact. There is no evidence of intrahepatic or extrahepatic biliary duct dilatation.       No evidence of biliary duct obstruction. No stone seen within the common bile duct.   Gallbladder sludge and stones as well as wall enhancement suspicious for cholecystitis.   Splenomegaly. Hepatic steatosis.   Robust vascular calcification. Artifact seen in the lumbar spine. Please see recent abdominopelvic CT   MACRO: None   Signed by: Alon Ga 4/18/2024 9:43 PM Dictation workstation:   [id number]    ECG 12 lead    Result Date: 4/18/2024  Sinus rhythm Nonspecific T  abnrm, anterolateral leads Borderline prolonged QT interval See ED provider note for full interpretation and clinical correlation Confirmed by Terri Bermudez ([id number]) on 4/18/2024 1:39:30 PM    US gallbladder    Result Date: 4/18/2024  Interpreted By:  Kalpana Izaguirre, STUDY: US GALLBLADDER;  4/18/2024 1:01 pm   INDICATION: Signs/Symptoms:abdominal painBowel.   COMPARISON: 04/13/2024   ACCESSION NUMBER(S): [id number]   ORDERING CLINICIAN: JESÚS GALLOWAY   TECHNIQUE: Multiple images of the right upper quadrant were obtained.   FINDINGS: LIVER:  Normal size and echogenicity. No focal lesion demonstrated.   GALLBLADDER:   Normally distended. Contains partially shadowing gallstones and sludge. Mild anterior wall thickening at 3.8 mm. Sonographic Luna sign is reportedly positive.   BILIARY TREE: The common duct measures  2 mm.   PANCREAS:   Partially obscured by overlying bowel gas.  Visualized portions within normal limits.   RIGHT KIDNEY: Increased echogenicity. Normal in size. Mild hydronephrosis. 1 cm cyst in the midpole.       Slightly thick-walled gallbladder containing partially shadowing stones and/or sludge with overlying tenderness. Findings are consistent with acute cholecystitis. Clinical correlation recommended.   Mild right hydronephrosis. Small right renal cyst. Increased echogenicity of the right kidney consistent with medical renal disease.   MACRO: None.   Signed by: Kalpana Izaguirre 4/18/2024 1:35 PM Dictation workstation:   [id number]         ASSESSMENT / PLAN   Principal Problem:    Abdominal pain  Active Problems:    Hypertension, essential    GERD (gastroesophageal reflux disease)    Hyperlipidemia    Myelodysplastic syndrome (Multi)    Type 2 diabetes mellitus with stage 4 chronic kidney disease, without long-term current use of insulin (Multi)    COPD without exacerbation (Multi)    Anemia, unspecified    ESRD (end stage renal disease) on dialysis (Multi)    Anemia due to chronic kidney  disease, on chronic dialysis (Multi)    Elevated LFTs    Thickening of wall of gallbladder       PLAN  1.  Had long discussion with the patient, and eventually her son (Adolph), regarding her blood work and MRCP results.  The MRCP does not show any choledocholithiasis.  She does have some gallbladder wall thickening which does not appear significantly different than a week ago.  There is some mild enhancement of the wall of the gallbladder which could be consistent with either acute or chronic cholecystitis.  Do not feel that she requires urgent surgical intervention.  She is asking to be discharged today as she wants to drive to [place] tomorrow for her sister's  on .  Since there was some enhancement of the wall of the gallbladder on MRCP, will give a 10-day course of Augmentin to treat for possible acute cholecystitis.  Will have patient follow-up with Dr. Grimes (surgeon who originally evaluated her) for further discussion of possible cholecystectomy surgery in the future.  2.  Reviewed that she needed to stay on a low grease low-fat diet to try to decrease her risk of recurrent gallbladder attacks.  3.  She has chronic anemia.  Did receive transfusion yesterday.  Hemoglobin level improved.  May need to consider transfusion prior to surgical intervention depending on her levels at that time.  4.  With her low hemoglobin and her advanced age, she will need cardiac risk stratification prior to scheduling surgical intervention.  Outpatient cardiology appointment to be scheduled.  5.  From a surgical standpoint, she is okay to be discharged today.  LFTs are trending down and her WBC remains within normal limits.  Prescription for Augmentin for 10 days placed.      Adina Kumari MD, FACS   [facility]  [address] [place] [address];   [address] [address]  [place]  [address] [phone]

## 2024-04-19 NOTE — CARE PLAN
The patient's goals for the shift include      Problem: Pain - Adult  Goal: Verbalizes/displays adequate comfort level or baseline comfort level  4/19/2024 1626 by Krystal Ortega RN  Outcome: Adequate for Discharge  4/19/2024 1513 by Krystal Ortega RN  Outcome: Progressing     Problem: Safety - Adult  Goal: Free from fall injury  4/19/2024 1626 by Krystal Ortega RN  Outcome: Adequate for Discharge  4/19/2024 1513 by Krystal Ortega RN  Outcome: Progressing     Problem: Discharge Planning  Goal: Discharge to home or other facility with appropriate resources  4/19/2024 1626 by Krystal Ortega RN  Outcome: Adequate for Discharge  4/19/2024 1513 by Krystal Ortega RN  Outcome: Progressing     Problem: Chronic Conditions and Co-morbidities  Goal: Patient's chronic conditions and co-morbidity symptoms are monitored and maintained or improved  4/19/2024 1626 by Krystal Ortega RN  Outcome: Adequate for Discharge  4/19/2024 1513 by Krystal Ortega RN  Outcome: Progressing     Problem: Diabetes  Goal: Achieve decreasing blood glucose levels by end of shift  4/19/2024 1626 by Krystal Ortega RN  Outcome: Adequate for Discharge  4/19/2024 1513 by Krystal Ortega RN  Outcome: Progressing  Goal: Increase stability of blood glucose readings by end of shift  4/19/2024 1626 by Krystal Ortega RN  Outcome: Adequate for Discharge  4/19/2024 1513 by Krystal Ortega RN  Outcome: Progressing  Goal: Decrease in ketones present in urine by end of shift  4/19/2024 1626 by Krystal Ortega RN  Outcome: Adequate for Discharge  4/19/2024 1513 by Krystal Ortega RN  Outcome: Progressing  Goal: Maintain electrolyte levels within acceptable range throughout shift  4/19/2024 1626 by Krystal Ortega RN  Outcome: Adequate for Discharge  4/19/2024 1513 by Krystal Ortega RN  Outcome: Progressing  Goal: Maintain glucose levels >70mg/dl to <250mg/dl throughout shift  4/19/2024 1626 by Krystal Ortega RN  Outcome: Adequate for Discharge  4/19/2024  1513 by Krystal Ortega RN  Outcome: Progressing  Goal: No changes in neurological exam by end of shift  4/19/2024 1626 by Krystal Ortega RN  Outcome: Adequate for Discharge  4/19/2024 1513 by Krystal Ortega RN  Outcome: Progressing  Goal: Learn about and adhere to nutrition recommendations by end of shift  4/19/2024 1626 by Krystal Ortega RN  Outcome: Adequate for Discharge  4/19/2024 1513 by Krystal Ortega RN  Outcome: Progressing  Goal: Vital signs within normal range for age by end of shift  4/19/2024 1626 by Krystal Ortega RN  Outcome: Adequate for Discharge  4/19/2024 1513 by Krystal Ortega RN  Outcome: Progressing  Goal: Increase self care and/or family involovement by end of shift  4/19/2024 1626 by Krystal Ortega RN  Outcome: Adequate for Discharge  4/19/2024 1513 by Krystal Ortega RN  Outcome: Progressing  Goal: Receive DSME education by end of shift  4/19/2024 1626 by Krystal Ortega RN  Outcome: Adequate for Discharge  4/19/2024 1513 by Krystal Ortega RN  Outcome: Progressing     Problem: Fall/Injury  Goal: Not fall by end of shift  4/19/2024 1626 by Krystal Ortega RN  Outcome: Adequate for Discharge  4/19/2024 1513 by Krystal Ortega RN  Outcome: Progressing  Goal: Be free from injury by end of the shift  4/19/2024 1626 by Krystal Ortega RN  Outcome: Adequate for Discharge  4/19/2024 1513 by Krystal Ortega RN  Outcome: Progressing  Goal: Verbalize understanding of personal risk factors for fall in the hospital  4/19/2024 1626 by Krystal Ortega RN  Outcome: Adequate for Discharge  4/19/2024 1513 by Krystal Ortega RN  Outcome: Progressing  Goal: Verbalize understanding of risk factor reduction measures to prevent injury from fall in the home  4/19/2024 1626 by Krystal Ortega RN  Outcome: Adequate for Discharge  4/19/2024 1513 by Krystal Ortega RN  Outcome: Progressing  Goal: Use assistive devices by end of the shift  4/19/2024 1626 by Krystal Ortega RN  Outcome: Adequate for  Discharge  4/19/2024 1513 by Krystal Ortega RN  Outcome: Progressing  Goal: Pace activities to prevent fatigue by end of the shift  4/19/2024 1626 by Krystal Ortega RN  Outcome: Adequate for Discharge  4/19/2024 1513 by Krystal Ortega RN  Outcome: Progressing     Problem: Skin  Goal: Decreased wound size/increased tissue granulation at next dressing change  4/19/2024 1626 by Krystal Ortega RN  Outcome: Adequate for Discharge  4/19/2024 1513 by Krystal Ortega RN  Outcome: Progressing  Goal: Participates in plan/prevention/treatment measures  4/19/2024 1626 by Krystal Ortega RN  Outcome: Adequate for Discharge  4/19/2024 1513 by Krystal Ortega RN  Outcome: Progressing  Goal: Promote/optimize nutrition  4/19/2024 1626 by Krystal Ortega RN  Outcome: Adequate for Discharge  4/19/2024 1513 by Krystal Ortega RN  Outcome: Progressing  Flowsheets (Taken 4/19/2024 1513)  Promote/optimize nutrition:   Consume > 50% meals/supplements   Offer water/supplements/favorite foods  Goal: Promote skin healing  4/19/2024 1626 by Krystal Ortega RN  Outcome: Adequate for Discharge  4/19/2024 1513 by Krystal Ortega RN  Outcome: Progressing  Flowsheets (Taken 4/19/2024 1513)  Promote skin healing:   Turn/reposition every 2 hours/use positioning/transfer devices   Protective dressings over bony prominences       The clinical goals for the shift include progressing    Patient voiced understanding of discharge instructions, home meds, scripts and follow up appointments. No questions or concerns noted.

## 2024-04-19 NOTE — CONSULTS
Wound Care Consult     Visit Date: 4/19/2024      Patient Name: Tana Hargrove         MRN: [id number]           YOB: 1945     Pertinent Labs:   Albumin   Date Value Ref Range Status   04/19/2024 3.4 3.4 - 5.0 g/dL Final     ALBUMIN (MG/L) IN URINE   Date Value Ref Range Status   09/26/2022 972.8 Not Established mg/L Final     Wound Assessment:  Wound 04/19/24 Diabetic Ulcer Foot Right;Plantar (Active)   Wound Image   04/19/24 0541   Site Assessment Brown;Red;Epithelialization;Dry 04/19/24 1400   Ana-Wound Assessment Calloused 04/19/24 1400   Non-staged Wound Description Full thickness 04/19/24 1400   Shape circular 04/19/24 1400   Wound Length (cm) 1 cm 04/19/24 1400   Wound Width (cm) 1 cm 04/19/24 1400   Wound Surface Area (cm^2) 1 cm^2 04/19/24 1400   Wound Depth (cm) 0.1 cm 04/19/24 1400   Wound Volume (cm^3) 0.1 cm^3 04/19/24 1400   Wound Healing % 78 04/19/24 1400   State of Healing Epithelial islands 04/19/24 1400   Margins Attached edges;Well-defined edges 04/19/24 1400   Drainage Description None 04/19/24 1400   Drainage Amount None 04/19/24 1400   Dressing Foam 04/19/24 1400   Dressing Changed Changed 04/19/24 1400   Dressing Status Clean;Occlusive;Dry 04/19/24 1400     Patient seen for diabetic wound to right foot (present on admission) complicated by PMH: HTN, HLD, CAD, HFpEF, COPD (no baseline O2), NIDDM2, ESRD on HD, chronic anemia, OA, anxiety and depression, chronic diabetic foot wound, DM and GERD per chart review. Patient alert and oriented, denies pain. Patient known to this RN from previous admissions, last wound consult 2/27/24. Prior wound to left plantar foot dry/healed/calloused. Wound to right plantar foot appears improved from last consult. Patient states she has not been going to wound center since prior discharge and has been “doctoring it at home with ATB ointment and a dressing”. Presentative foams placed to bilateral heels. Skin hygiene and dressing care provided. See  detailed assessment above from flowsheet. Recommendations below.     Wound location: Right plantar foot    Treatment protocol recommended:  Paint with betadine swab, apply clean dry dressing every 3 days/prn.   Continue to off load, turning at least every 2 hours. Offload heels.    Therapeutic surface: Patient on [employer] standard pressure relieving mattress during exam. Staff to continue to turn/reposition patient atleast every 2 hours. Offload heels.     Nursing updated, continue pressure injury preventions, nursing to follow provider orders and re-consult wound RN if needed.    See above recommendations for treatment. I would recommend follow up at wound center as needed upon discharge.     Please contact me with questions or changes in patient condition.  Cristina Dove RN  [facility]   [phone]

## 2024-04-19 NOTE — PROGRESS NOTES
[facility] Heart and Vascular Cardiology    Patient Name: Tana Hargrove  Patient : 1945      Scribe Attestation  By signing my name below, IKrista Scribe   attest that this documentation has been prepared under the direction and in the presence of Humphrey Callaway DO.      Reason for visit:  This is an 79-year-old female here for follow-up regarding paroxysmal atrial fibrillation not on anticoagulation given her history of myelodysplastic syndrome and associated anemia requiring blood transfusions, chronic diastolic heart failure, coronary artery disease, hypertension, dyslipidemia, diabetes mellitus, end-stage renal disease on hemodialysis followed by nephrology, and anemia.     HPI:  This is an 79-year-old female here for follow-up regarding paroxysmal atrial fibrillation not on anticoagulation given her history of myelodysplastic syndrome and associated anemia requiring blood transfusions, chronic diastolic heart failure, coronary artery disease, hypertension, dyslipidemia, diabetes mellitus, end-stage renal disease on hemodialysis followed by nephrology, and anemia.  The patient was last evaluated by me in May 2023.  At that visit I ordered blood work including BMP/BNP/magnesium/lipid panel to be drawn in 6 months and asked the patient to follow-up in 6 months and sooner if necessary.  The patient has had multiple hospitalizations since I last saw her in the office and was recently discharged on 4/15/2024 after a hospitalization secondary to pneumonia.  BMP done 2024 showed serum sodium 135, serum potassium 3.6, serum creatinine of 1.57 consistent with known CKD, serum magnesium was 1.77, CBC showed a hemoglobin of 7.7 with platelet count of 255.  CT scan of the chest done 2024 showed moderate triple-vessel coronary vascular calcifications, cholelithiasis and mild gallbladder wall thickening, small patchy opacity in the dependent portion of the right upper lobe.   Echocardiogram done in February 2024 showed normal left ventricular systolic function with an ejection fraction of 60%, LVH, grade 1 diastolic dysfunction, mild to moderate tricuspid valve regurgitation.  ECG done 4/14/2024 showing sinus rhythm with nonspecific T abnormalities and heart rate of 75 bpm. The patient had a hospitalization on 4/18/24 due to abdominal pain due to cholelithiasis with cholecystitis without biliary obstruction, and anemia.  CMP done on 4/18/24 showed normal serum sodium and potassium and a serum creatinine of 2.07. ALT 77, AST 90, . CBC showed a hemoglobin of 6.4. The patient was seen at the ED on 4/22/24 due to fall sustaining left parietal scalp laceration and small hematoma. CMP done on 4/22/24  showed normal serum sodium and potassium and a serum creatinine of 3.03, sheree ALT and AST. Serum magnesium was 1.81, BNP was 223, Troponin I was 8. CBC showed a hemoglobin of 7.0. CT of the head and cervical spine showed high posterior left parietal scalp laceration with small scalp  hematoma. No depressed skull fracture. No acute intracranial bleed or  focal mass effect. Moderate volume loss in the brain. No CT evidence of cervical spine fracture in this exam. Cervical spine DJD.  ECG done today showed ***          Assessment/Plan:   1. Paroxysmal atrial fibrillation  The patient has a history of paroxysmal atrial fibrillation not on anticoagulation given her history of myelodysplastic syndrome and associated anemia requiring blood transfusions.  She should continue metoprolol tartrate for heart rate control.  ECG done today showed ***  She denies chest pain, palpitations or lightheadedness.  Echocardiogram done in February 2024 showed normal left ventricular systolic function with an ejection  fraction of 60%, LVH, grade 1 diastolic dysfunction, mild to moderate tricuspid valve regurgitation.  Recent lab works as noted in the HPI.   Lab works as noted below will be done in 6 months  prior to his next visit.   Follow up in 6 months and sooner if necessary.      2. Chronic diastolic heart failure  The patient has a history of chronic diastolic heart failure.  Echocardiogram done in February 2024 showed normal left ventricular systolic function with an ejection fraction of 60%, LVH, grade 1 diastolic dysfunction, mild to moderate tricuspid valve regurgitation.  He does not appear volume overloaded on exam today.  He should continue current heart failure medications.  Recent lab works as noted in the HPI.   Lab works as noted below will be done in 6 months prior to his next visit.   I discussed with him the importance of following a low-sodium heart healthy diet as well as weight loss.   Follow up in 6 months and sooner if necessary.      3. Coronary artery disease  The patient has a history of CAD.  CT scan of the chest done 4/13/2024 showed moderate triple-vessel coronary vascular calcifications, cholelithiasis and mild gallbladder wall thickening, small patchy opacity in the dependent portion of the right upper lobe.   ECG done today showed ***  She denies anginal chest discomfort.  Blood pressure appears controlled on exam today.  She should continue current antihypertensive medications.   Echocardiogram done in February 2024 showed normal left ventricular systolic function with an ejection fraction of 60%, LVH, grade 1 diastolic dysfunction, mild to moderate tricuspid valve regurgitation.  Recent lab works as noted in the HPI.   Lipid panel done in May 2023 showed an LDL of 57 and triglycerides of 75, currently on pravastatin 40 mg.  Recent lab works as noted in the HPI.   Lab works as noted below will be done in 6 months prior to his next visit.   Please see lifestyle recommendations below.   Follow up in 6 months and sooner if necessary.      4. Dyslipidemia  Lipid panel done in May 2023 showed an LDL of 57 and triglycerides of 75, currently on pravastatin 40 mg.  Lipid panel will be updated  in 6 months.  Please see lifestyle recommendations below.      5. Hypertension  The patient has a history of hypertension and blood pressure appears controlled on exam today.   She should continue her current antihypertensive medications.      6. CKD  Serum creatinine done in April 2024 was 2.07 consistent with known CKD.  Management as per PCP.     7. Myelodysplastic syndrome/anemia  CBC done on 4/18/24 showed a hemoglobin of 6.4.   Management as per Hematology-Oncology.           Orders:   CMP/lipid/magnesium in 6 months,   Follow-up in 6 months.      Lifestyle Recommendations  I recommend a whole-food plant-based diet, an eating pattern that encourages the consumption of unrefined plant foods (such as fruits, vegetables, tubers, whole grains, legumes, nuts and seeds) and discourages meats, dairy products, eggs and processed foods.     The AHA/ACC recommends that the patient consume a dietary pattern that emphasizes intake of vegetables, fruits, and whole grains; includes low-fat dairy products, poultry, fish, legumes, non-tropical vegetable oils, and nuts; and limits intake of sodium, sweets, sugar-sweetened beverages, and red meats.  Adapt this dietary pattern to appropriate calorie requirements (a 500-750 kcal/day deficit to loose weight), personal and cultural food preferences, and nutrition therapy for other medical conditions (including diabetes).  Achieve this pattern by following plans such as the [place] Mediterranean, DASH dietary pattern, or AHA diet.     Engage in 2 hours and 30 minutes per week of moderate-intensity physical activity, or 1 hour and 15 minutes (75 minutes) per week of vigorous-intensity aerobic physical activity, or an equivalent combination of moderate and vigorous-intensity aerobic physical activity. Aerobic activity should be performed in episodes of at least 10 minutes preferably spread throughout the week.     Adhering to a heart healthy diet, regular exercise habits, avoidance of  tobacco products, and maintenance of a healthy weight are crucial components of their heart disease risk reduction.     Any positive review of systems not specifically addressed in the office visit today should be evaluated and treated by the patients primary care physician or in an emergency department if necessary     Patient was notified that results from ordered tests will be called to the patient if it changes current management; it will otherwise be discussed at a future appointment and available on [facility].     Thank you for allowing me to participate in the care of this patient.        This document was generated using the assistance of voice recognition software. If there are any errors of spelling, grammar, syntax, or meaning; please feel free to contact me directly for clarification.    Past Medical History:  She has a past medical history of Abnormal levels of other serum enzymes, Acute kidney failure (CMS-HCC), Anemia, CKD (chronic kidney disease), COPD (chronic obstructive pulmonary disease) (Multi), Disease of blood and blood-forming organs, unspecified, HLD (hyperlipidemia), MDS (myelodysplastic syndrome) (Multi), Personal history of other diseases of the musculoskeletal system and connective tissue, Personal history of other specified conditions, Personal history of other specified conditions, and Type 2 diabetes mellitus (Multi).    Past Surgical History:  She has a past surgical history that includes Other surgical history (12/13/2019); Other surgical history (12/13/2019); Other surgical history (Bilateral, 12/13/2019); Other surgical history (Left, 12/13/2019); Other surgical history (12/13/2019); Other surgical history (12/13/2019); Other surgical history (Right, 12/13/2019); Other surgical history (04/16/2021); MR angio head wo IV contrast (11/20/2020); MR angio neck wo IV contrast (11/20/2020); Breast biopsy (Left); Hysterectomy; Breast lumpectomy; Appendectomy; Colonoscopy; and  "Oophorectomy.      Social History:  She reports that she has never smoked. She has never used smokeless tobacco. She reports that she does not currently use alcohol. She reports that she does not use drugs.    Family History:  No family history on file.     Allergies:  Codeine, Hydrocodone, Hydrocodone-acetaminophen, Meperidine (pf), Tramadol, Piperacillin-tazobactam, Adhesive tape-silicones, and Meperidine    Outpatient Medications:  Current Outpatient Medications   Medication Instructions    allopurinol (ZYLOPRIM) 100 mg, oral, Daily    amLODIPine (Norvasc) 5 mg tablet 1 tablet, oral, Daily    cefdinir (OMNICEF) 300 mg, oral, 2 times daily    cholecalciferol (Vitamin D-3) 50 MCG (2000 UT) tablet 1 tablet, oral, Daily    cyanocobalamin (Vitamin B-12) 1,000 mcg tablet 1 tablet, oral, Daily    metoprolol tartrate (LOPRESSOR) 25 mg, oral, 2 times daily    pioglitazone (ACTOS) 15 mg, oral, Daily    pravastatin (PRAVACHOL) 40 mg, oral, Nightly    pregabalin (Lyrica) 100 mg capsule TAKE ONE CAPSULE BY MOUTH TWICE DAILY @9AM & 5PM    sevelamer carbonate (RENVELA) 800 mg, oral, 3 times daily with meals, Swallow tablet whole; do not crush, break, or chew.    SITagliptin phosphate (JANUVIA) 25 mg, oral, Daily        ROS:  A 14 point review of systems was done and is negative other than as stated in HPI    Vitals:      4/18/2024     3:14 PM 4/18/2024     3:30 PM 4/18/2024     5:15 PM 4/18/2024     5:47 PM 4/18/2024     6:15 PM 4/18/2024     6:31 PM 4/18/2024     9:01 PM   Vitals   Systolic 166  143 145 177  182   Diastolic 63  71 65 74  68   Heart Rate 76   72 76  73   Temp 36.6 °C (97.9 °F) 36.6 °C (97.8 °F) 36.7 °C (98 °F) 36.7 °C (98 °F) 37.1 °C (98.7 °F)  36.9 °C (98.4 °F)   Resp 16   18 18  18   Height (in)      1.549 m (5' 0.98\")    Weight (lb)      153    BMI      28.92 kg/m2    BSA (m2)      1.73 m2         Physical Exam:   ***  Constitutional: Cooperative, in no acute distress, alert, appears stated age.   Skin: Skin " color, texture, turgor normal. No rashes or lesions.   Head: Normocephalic. No masses, lesions, tenderness or abnormalities   Eyes: Extraocular movements are grossly intact.   Mouth and throat: Mucous membranes moist   Neck: Neck supple, no carotid bruits, no JVD   Respiratory: Lungs clear to auscultation, no wheezing or rhonchi, no use of accessory muscles   Chest wall: No scars, normal excursion with respiration   Cardiovascular: Regular rhythm with + murmur  Gastrointestinal: Abdomen soft, nontender. Bowel sounds normal.   Musculoskeletal: Strength equal in upper extremities   Extremities: Trace pitting edema   Neurologic: Sensation grossly intact, alert and oriented x3    Intake/Output:   No intake/output data recorded.    Outpatient Medications  Current Facility-Administered Medications on File Prior to Visit   Medication Dose Route Frequency Provider Last Rate Last Admin    acetaminophen (Tylenol) tablet 650 mg  650 mg oral q4h PRN Rosa Calabrese PA-C        allopurinol (Zyloprim) tablet 100 mg  100 mg oral Daily Rosa Calabrese PA-C        amLODIPine (Norvasc) tablet 5 mg  5 mg oral Daily Rosa Calabrese PA-C        bisacodyl (Dulcolax) EC tablet 10 mg  10 mg oral Daily PRN Rosa Calabrese PA-C        cefdinir (Omnicef) capsule 300 mg  300 mg oral Daily [facility], [facility]   300 mg at 04/18/24 2107    cholecalciferol (Vitamin D-3) tablet 2,000 Units  2,000 Units oral Daily Rosa Calabrese PA-C        cyanocobalamin (Vitamin B-12) tablet 1,000 mcg  1,000 mcg oral Daily Rosa Calabrese PA-C        [COMPLETED] gadoterate meglumine (Dotarem) 0.5 mmol/mL contrast injection 14 mL  0.2 mL/kg intravenous Once in imaging Earl Zuniga DO   14 mL at 04/18/24 2021    guaiFENesin (Mucinex) 12 hr tablet 600 mg  600 mg oral q12h PRN Rosa Calabrese PA-C        melatonin tablet 3 mg  3 mg oral Nightly Rosa Calabrese PA-C   3 mg at 04/18/24 2107    metoprolol tartrate (Lopressor) tablet 25 mg   25 mg oral BID Rosa Calabrese PA-C   25 mg at 04/18/24 2107    ondansetron (Zofran) tablet 4 mg  4 mg oral q8h PRN Rosa Calabrese PA-C        Or    ondansetron (Zofran) injection 4 mg  4 mg intravenous q8h PRN Rosa Calabrese PA-C        pantoprazole (ProtoNix) EC tablet 40 mg  40 mg oral Daily before breakfast Rosa Calabrese PA-C        Or    pantoprazole (ProtoNix) injection 40 mg  40 mg intravenous Daily before breakfast Rosa Calabrese PA-C        polyethylene glycol (Glycolax, Miralax) packet 17 g  17 g oral Daily Rosa Calabrese PA-C        pravastatin (Pravachol) tablet 40 mg  40 mg oral Nightly Rosa Calabrese PA-C   40 mg at 04/18/24 2107    pregabalin (Lyrica) capsule 100 mg  100 mg oral BID Rosa Calabrese PA-C   100 mg at 04/18/24 2113    sevelamer carbonate (Renvela) tablet 800 mg  800 mg oral TID with meals Rosa Calabrese PA-C        [DISCONTINUED] cefdinir (Omnicef) capsule 300 mg  300 mg oral BID Rosa Calabrese PA-C         Current Outpatient Medications on File Prior to Visit   Medication Sig Dispense Refill    allopurinol (Zyloprim) 100 mg tablet Take 1 tablet (100 mg) by mouth once daily. 90 tablet 1    amLODIPine (Norvasc) 5 mg tablet Take 1 tablet (5 mg) by mouth once daily.      cefdinir (Omnicef) 300 mg capsule Take 1 capsule (300 mg) by mouth 2 times a day for 4 days. 8 capsule 0    cholecalciferol (Vitamin D-3) 50 MCG (2000 UT) tablet Take 1 tablet (2,000 Units) by mouth once daily.      cyanocobalamin (Vitamin B-12) 1,000 mcg tablet Take 1 tablet (1,000 mcg) by mouth once daily.      metoprolol tartrate (Lopressor) 25 mg tablet Take 1 tablet (25 mg) by mouth 2 times a day. 60 tablet 3    pioglitazone (Actos) 15 mg tablet Take 1 tablet (15 mg) by mouth once daily. 30 tablet 3    pravastatin (Pravachol) 40 mg tablet Take 1 tablet (40 mg) by mouth once daily at bedtime. 90 tablet 1    pregabalin (Lyrica) 100 mg capsule TAKE ONE CAPSULE BY MOUTH TWICE DAILY  @9AM & 5PM 60 capsule 0    sevelamer carbonate (Renvela) 800 mg tablet Take 1 tablet (800 mg) by mouth 3 times a day with meals. Swallow tablet whole; do not crush, break, or chew. 30 tablet 1    SITagliptin phosphate (Januvia) 25 mg tablet Take 1 tablet (25 mg) by mouth once daily. 30 tablet 3    [DISCONTINUED] epoetin alejandra (Procrit) 10,000 unit/mL injection Pt gets every Monday.      [DISCONTINUED] metoprolol tartrate (Lopressor) 25 mg tablet Take 1 tablet (25 mg) by mouth once daily. 30 tablet 11    [DISCONTINUED] ondansetron ODT (Zofran-ODT) 4 mg disintegrating tablet Take 2 tablets (8 mg) by mouth every 8 hours if needed for nausea or vomiting. (Patient not taking: Reported on 4/16/2024) 20 tablet 0    [DISCONTINUED] oxygen (O2) gas therapy Inhale 3 L/min once every 24 hours. (Patient not taking: Reported on 4/16/2024)      [DISCONTINUED] pioglitazone (Actos) 15 mg tablet TAKE 1 TABLET BY MOUTH ONCE DAILY 90 tablet 0    [DISCONTINUED] pravastatin (Pravachol) 40 mg tablet TAKE 1 TABLET BY MOUTH ONCE DAILY AT BEDTIME 90 tablet 0    [DISCONTINUED] pregabalin (Lyrica) 100 mg capsule TAKE ONE CAPSULE BY MOUTH TWICE DAILY @9AM & 5PM 6 capsule 0    [DISCONTINUED] sennosides-docusate sodium (Ana-Colace) 8.6-50 mg tablet Take 2 tablets by mouth 2 times a day as needed for constipation. (Patient not taking: Reported on 4/16/2024) 30 tablet 0    [DISCONTINUED] SITagliptin phosphate (Januvia) 25 mg tablet Take 1 tablet (25 mg) by mouth once daily. Do not start before March 6, 2024. 30 tablet 1       Labs: (past 26 weeks)  Recent Results (from the past 4368 hour(s))   CBC and Auto Differential    Collection Time: 10/23/23  2:42 PM   Result Value Ref Range    WBC 6.9 4.4 - 11.3 x10*3/uL    nRBC      RBC 2.90 (L) 4.00 - 5.20 x10*6/uL    Hemoglobin 8.9 (L) 12.0 - 16.0 g/dL    Hematocrit 28.1 (L) 36.0 - 46.0 %    MCV 97 80 - 100 fL    MCH 30.7 26.0 - 34.0 pg    MCHC 31.7 (L) 32.0 - 36.0 g/dL    RDW 21.2 (H) 11.5 - 14.5 %     Platelets 282 150 - 450 x10*3/uL    MPV 11.5 7.5 - 11.5 fL    Immature Granulocytes %, Automated 0.1 0.0 - 0.9 %    Immature Granulocytes Absolute, Automated 0.01 0.00 - 0.50 x10*3/uL   Erythropoietin    Collection Time: 10/23/23  2:42 PM   Result Value Ref Range    Erythropoietin 38 (H) 4 - 27 mU/mL   Ferritin    Collection Time: 10/23/23  2:42 PM   Result Value Ref Range    Ferritin 2,592 (H) 8 - 150 ng/mL   Iron and TIBC    Collection Time: 10/23/23  2:42 PM   Result Value Ref Range    Iron 78 35 - 150 ug/dL    UIBC 111 110 - 370 ug/dL    TIBC 189 (L) 240 - 445 ug/dL    % Saturation 41 25 - 45 %   Manual Differential    Collection Time: 10/23/23  2:42 PM   Result Value Ref Range    Neutrophils %, Manual 55.0 40.0 - 80.0 %    Bands %, Manual 9.0 0.0 - 5.0 %    Lymphocytes %, Manual 27.0 13.0 - 44.0 %    Monocytes %, Manual 6.0 2.0 - 10.0 %    Eosinophils %, Manual 3.0 0.0 - 6.0 %    Basophils %, Manual 0.0 0.0 - 2.0 %    Seg Neutrophils Absolute, Manual 3.80 1.60 - 5.00 x10*3/uL    Bands Absolute, Manual 0.62 (H) 0.00 - 0.50 x10*3/uL    Lymphocytes Absolute, Manual 1.86 0.80 - 3.00 x10*3/uL    Monocytes Absolute, Manual 0.41 0.05 - 0.80 x10*3/uL    Eosinophils Absolute, Manual 0.21 0.00 - 0.40 x10*3/uL    Basophils Absolute, Manual 0.00 0.00 - 0.10 x10*3/uL    Total Cells Counted 100     Neutrophils Absolute, Manual 4.42 1.60 - 5.50 x10*3/uL    RBC Morphology See Below     Hypochromia Mild     Ovalocytes Few     Teardrop Cells Few     Acanthocytes Few    CBC and Auto Differential    Collection Time: 10/30/23  1:56 PM   Result Value Ref Range    WBC 7.0 4.4 - 11.3 x10*3/uL    nRBC      RBC 2.52 (L) 4.00 - 5.20 x10*6/uL    Hemoglobin 8.0 (L) 12.0 - 16.0 g/dL    Hematocrit 25.1 (L) 36.0 - 46.0 %     80 - 100 fL    MCH 31.7 26.0 - 34.0 pg    MCHC 31.9 (L) 32.0 - 36.0 g/dL    RDW 21.5 (H) 11.5 - 14.5 %    Platelets 292 150 - 450 x10*3/uL    MPV 11.3 7.5 - 11.5 fL    Neutrophils % 62.6 40.0 - 80.0 %    Immature  Granulocytes %, Automated 0.3 0.0 - 0.9 %    Lymphocytes % 25.7 13.0 - 44.0 %    Monocytes % 8.2 2.0 - 10.0 %    Eosinophils % 2.8 0.0 - 6.0 %    Basophils % 0.4 0.0 - 2.0 %    Neutrophils Absolute 4.28 1.60 - 5.50 x10*3/uL    Immature Granulocytes Absolute, Automated 0.02 0.00 - 0.50 x10*3/uL    Lymphocytes Absolute 1.76 0.80 - 3.00 x10*3/uL    Monocytes Absolute 0.56 0.05 - 0.80 x10*3/uL    Eosinophils Absolute 0.19 0.00 - 0.40 x10*3/uL    Basophils Absolute 0.03 0.00 - 0.10 x10*3/uL   Morphology    Collection Time: 10/30/23  1:56 PM   Result Value Ref Range    RBC Morphology See Below     Polychromasia Mild     Hypochromia Mild     Ovalocytes Few     Teardrop Cells Few     Giant Platelets Few    CBC and Auto Differential    Collection Time: 11/06/23  3:08 PM   Result Value Ref Range    WBC 6.0 4.4 - 11.3 x10*3/uL    nRBC      RBC 2.49 (L) 4.00 - 5.20 x10*6/uL    Hemoglobin 7.9 (L) 12.0 - 16.0 g/dL    Hematocrit 24.8 (L) 36.0 - 46.0 %     80 - 100 fL    MCH 31.7 26.0 - 34.0 pg    MCHC 31.9 (L) 32.0 - 36.0 g/dL    RDW 22.4 (H) 11.5 - 14.5 %    Platelets 250 150 - 450 x10*3/uL    Neutrophils % 63.2 40.0 - 80.0 %    Immature Granulocytes %, Automated 0.3 0.0 - 0.9 %    Lymphocytes % 25.6 13.0 - 44.0 %    Monocytes % 7.4 2.0 - 10.0 %    Eosinophils % 2.8 0.0 - 6.0 %    Basophils % 0.7 0.0 - 2.0 %    Neutrophils Absolute 3.78 1.60 - 5.50 x10*3/uL    Immature Granulocytes Absolute, Automated 0.02 0.00 - 0.50 x10*3/uL    Lymphocytes Absolute 1.53 0.80 - 3.00 x10*3/uL    Monocytes Absolute 0.44 0.05 - 0.80 x10*3/uL    Eosinophils Absolute 0.17 0.00 - 0.40 x10*3/uL    Basophils Absolute 0.04 0.00 - 0.10 x10*3/uL   Morphology    Collection Time: 11/06/23  3:08 PM   Result Value Ref Range    RBC Morphology See Below     Polychromasia Mild     Hypochromia Mild     Target Cells Few     Ovalocytes Few     Teardrop Cells Few    CBC and Auto Differential    Collection Time: 11/13/23  2:20 PM   Result Value Ref Range    WBC  5.4 4.4 - 11.3 x10*3/uL    nRBC      RBC 2.27 (L) 4.00 - 5.20 x10*6/uL    Hemoglobin 7.2 (L) 12.0 - 16.0 g/dL    Hematocrit 23.1 (L) 36.0 - 46.0 %     (H) 80 - 100 fL    MCH 31.7 26.0 - 34.0 pg    MCHC 31.2 (L) 32.0 - 36.0 g/dL    RDW 24.2 (H) 11.5 - 14.5 %    Platelets 230 150 - 450 x10*3/uL    Neutrophils % 60.3 40.0 - 80.0 %    Immature Granulocytes %, Automated 0.0 0.0 - 0.9 %    Lymphocytes % 26.3 13.0 - 44.0 %    Monocytes % 8.8 2.0 - 10.0 %    Eosinophils % 3.7 0.0 - 6.0 %    Basophils % 0.9 0.0 - 2.0 %    Neutrophils Absolute 3.28 1.60 - 5.50 x10*3/uL    Immature Granulocytes Absolute, Automated 0.00 0.00 - 0.50 x10*3/uL    Lymphocytes Absolute 1.43 0.80 - 3.00 x10*3/uL    Monocytes Absolute 0.48 0.05 - 0.80 x10*3/uL    Eosinophils Absolute 0.20 0.00 - 0.40 x10*3/uL    Basophils Absolute 0.05 0.00 - 0.10 x10*3/uL   Morphology    Collection Time: 11/13/23  2:20 PM   Result Value Ref Range    RBC Morphology See Below     Polychromasia Mild     Hypochromia Mild     Ovalocytes Few     Teardrop Cells Few     Basophilic Stippling Present    CBC and Auto Differential    Collection Time: 11/20/23  1:52 PM   Result Value Ref Range    WBC 5.1 4.4 - 11.3 x10*3/uL    nRBC      RBC 2.48 (L) 4.00 - 5.20 x10*6/uL    Hemoglobin 7.8 (L) 12.0 - 16.0 g/dL    Hematocrit 24.9 (L) 36.0 - 46.0 %     80 - 100 fL    MCH 31.5 26.0 - 34.0 pg    MCHC 31.3 (L) 32.0 - 36.0 g/dL    RDW 24.6 (H) 11.5 - 14.5 %    Platelets 228 150 - 450 x10*3/uL    Neutrophils % 65.0 40.0 - 80.0 %    Immature Granulocytes %, Automated 0.2 0.0 - 0.9 %    Lymphocytes % 23.2 13.0 - 44.0 %    Monocytes % 8.6 2.0 - 10.0 %    Eosinophils % 1.8 0.0 - 6.0 %    Basophils % 1.2 0.0 - 2.0 %    Neutrophils Absolute 3.34 1.60 - 5.50 x10*3/uL    Immature Granulocytes Absolute, Automated 0.01 0.00 - 0.50 x10*3/uL    Lymphocytes Absolute 1.19 0.80 - 3.00 x10*3/uL    Monocytes Absolute 0.44 0.05 - 0.80 x10*3/uL    Eosinophils Absolute 0.09 0.00 - 0.40 x10*3/uL     Basophils Absolute 0.06 0.00 - 0.10 x10*3/uL   Morphology    Collection Time: 11/20/23  1:52 PM   Result Value Ref Range    RBC Morphology See Below     Polychromasia Mild     Ovalocytes Few     Teardrop Cells Few    CBC and Auto Differential    Collection Time: 11/27/23 11:34 AM   Result Value Ref Range    WBC 5.6 4.4 - 11.3 x10*3/uL    nRBC      RBC 2.43 (L) 4.00 - 5.20 x10*6/uL    Hemoglobin 7.7 (L) 12.0 - 16.0 g/dL    Hematocrit 24.6 (L) 36.0 - 46.0 %     (H) 80 - 100 fL    MCH 31.7 26.0 - 34.0 pg    MCHC 31.3 (L) 32.0 - 36.0 g/dL    RDW 24.7 (H) 11.5 - 14.5 %    Platelets 356 150 - 450 x10*3/uL    Neutrophils % 55.8 40.0 - 80.0 %    Immature Granulocytes %, Automated 0.2 0.0 - 0.9 %    Lymphocytes % 30.2 13.0 - 44.0 %    Monocytes % 8.6 2.0 - 10.0 %    Eosinophils % 4.1 0.0 - 6.0 %    Basophils % 1.1 0.0 - 2.0 %    Neutrophils Absolute 3.13 1.60 - 5.50 x10*3/uL    Immature Granulocytes Absolute, Automated 0.01 0.00 - 0.50 x10*3/uL    Lymphocytes Absolute 1.69 0.80 - 3.00 x10*3/uL    Monocytes Absolute 0.48 0.05 - 0.80 x10*3/uL    Eosinophils Absolute 0.23 0.00 - 0.40 x10*3/uL    Basophils Absolute 0.06 0.00 - 0.10 x10*3/uL   Morphology    Collection Time: 11/27/23 11:34 AM   Result Value Ref Range    RBC Morphology See Below     Polychromasia Mild     Hypochromia Mild     Teardrop Cells Few     Basophilic Stippling Present    CBC and Auto Differential    Collection Time: 12/05/23 11:53 AM   Result Value Ref Range    WBC 4.8 4.4 - 11.3 x10*3/uL    nRBC      RBC 2.22 (L) 4.00 - 5.20 x10*6/uL    Hemoglobin 7.2 (L) 12.0 - 16.0 g/dL    Hematocrit 23.1 (L) 36.0 - 46.0 %     (H) 80 - 100 fL    MCH 32.4 26.0 - 34.0 pg    MCHC 31.2 (L) 32.0 - 36.0 g/dL    RDW 24.7 (H) 11.5 - 14.5 %    Platelets 275 150 - 450 x10*3/uL    Neutrophils % 55.7 40.0 - 80.0 %    Immature Granulocytes %, Automated 0.2 0.0 - 0.9 %    Lymphocytes % 32.0 13.0 - 44.0 %    Monocytes % 6.8 2.0 - 10.0 %    Eosinophils % 3.9 0.0 - 6.0  %    Basophils % 1.4 0.0 - 2.0 %    Neutrophils Absolute 2.69 1.60 - 5.50 x10*3/uL    Immature Granulocytes Absolute, Automated 0.01 0.00 - 0.50 x10*3/uL    Lymphocytes Absolute 1.55 0.80 - 3.00 x10*3/uL    Monocytes Absolute 0.33 0.05 - 0.80 x10*3/uL    Eosinophils Absolute 0.19 0.00 - 0.40 x10*3/uL    Basophils Absolute 0.07 0.00 - 0.10 x10*3/uL   Morphology    Collection Time: 12/05/23 11:53 AM   Result Value Ref Range    RBC Morphology See Below     Polychromasia Mild     Hypochromia Mild     Target Cells Few     Ovalocytes Few     Teardrop Cells Few    CBC and Auto Differential    Collection Time: 12/12/23  1:33 PM   Result Value Ref Range    WBC 6.3 4.4 - 11.3 x10*3/uL    nRBC      RBC 2.37 (L) 4.00 - 5.20 x10*6/uL    Hemoglobin 7.7 (L) 12.0 - 16.0 g/dL    Hematocrit 24.2 (L) 36.0 - 46.0 %     (H) 80 - 100 fL    MCH 32.5 26.0 - 34.0 pg    MCHC 31.8 (L) 32.0 - 36.0 g/dL    RDW 23.8 (H) 11.5 - 14.5 %    Platelets 290 150 - 450 x10*3/uL    Neutrophils % 60.9 40.0 - 80.0 %    Immature Granulocytes %, Automated 0.2 0.0 - 0.9 %    Lymphocytes % 28.5 13.0 - 44.0 %    Monocytes % 8.3 2.0 - 10.0 %    Eosinophils % 1.1 0.0 - 6.0 %    Basophils % 1.0 0.0 - 2.0 %    Neutrophils Absolute 3.82 1.60 - 5.50 x10*3/uL    Immature Granulocytes Absolute, Automated 0.01 0.00 - 0.50 x10*3/uL    Lymphocytes Absolute 1.79 0.80 - 3.00 x10*3/uL    Monocytes Absolute 0.52 0.05 - 0.80 x10*3/uL    Eosinophils Absolute 0.07 0.00 - 0.40 x10*3/uL    Basophils Absolute 0.06 0.00 - 0.10 x10*3/uL   Morphology    Collection Time: 12/12/23  1:33 PM   Result Value Ref Range    RBC Morphology See Below     Hypochromia Mild     Target Cells Few     Ovalocytes Few     Teardrop Cells Few     Stomatocytes Few    Urine Culture    Collection Time: 12/13/23 11:52 AM    Specimen: Clean Catch/Voided; Urine   Result Value Ref Range    Urine Culture >100,000 Klebsiella pneumoniae/variicola (A)        Susceptibility    Klebsiella pneumoniae/variicola -  MICROSCAN     Ampicillin  Resistant      Amoxicillin/Clavulanate  Susceptible      Ampicillin/Sulbactam  Susceptible      Cefazolin  Susceptible      Cefazolin (uncomplicated UTIs only)  Susceptible      Ciprofloxacin  Susceptible      Gentamicin  Susceptible      Nitrofurantoin  Susceptible      Piperacillin/Tazobactam  Susceptible      Trimethoprim/Sulfamethoxazole  Resistant    POCT UA (nonautomated) manually resulted    Collection Time: 12/13/23 11:53 AM   Result Value Ref Range    POC Color, Urine Straw Straw, Yellow, Light-Yellow    POC Appearance, Urine Turbid (A) Clear    POC Glucose, Urine NEGATIVE NEGATIVE mg/dl    POC Bilirubin, Urine NEGATIVE NEGATIVE    POC Ketones, Urine TRACE (A) NEGATIVE mg/dl    POC Specific Gravity, Urine >=1.030 1.005 - 1.035    POC Blood, Urine LARGE (3+) (A) NEGATIVE    POC PH, Urine 5.0 No Reference Range Established PH    POC Protein, Urine >=300 (3+) (A) NEGATIVE, 30 (1+) mg/dl    POC Urobilinogen, Urine 1.0 0.2, 1.0 EU/DL    Poc Nitrite, Urine NEGATIVE NEGATIVE    POC Leukocytes, Urine LARGE (3+) (A) NEGATIVE   CBC and Auto Differential    Collection Time: 12/19/23 10:18 AM   Result Value Ref Range    WBC 5.3 4.4 - 11.3 x10*3/uL    nRBC      RBC 2.22 (L) 4.00 - 5.20 x10*6/uL    Hemoglobin 7.2 (L) 12.0 - 16.0 g/dL    Hematocrit 23.0 (L) 36.0 - 46.0 %     (H) 80 - 100 fL    MCH 32.4 26.0 - 34.0 pg    MCHC 31.3 (L) 32.0 - 36.0 g/dL    RDW 24.0 (H) 11.5 - 14.5 %    Platelets 367 150 - 450 x10*3/uL    Neutrophils % 56.3 40.0 - 80.0 %    Immature Granulocytes %, Automated 0.4 0.0 - 0.9 %    Lymphocytes % 29.5 13.0 - 44.0 %    Monocytes % 9.3 2.0 - 10.0 %    Eosinophils % 3.6 0.0 - 6.0 %    Basophils % 0.9 0.0 - 2.0 %    Neutrophils Absolute 2.97 1.60 - 5.50 x10*3/uL    Immature Granulocytes Absolute, Automated 0.02 0.00 - 0.50 x10*3/uL    Lymphocytes Absolute 1.56 0.80 - 3.00 x10*3/uL    Monocytes Absolute 0.49 0.05 - 0.80 x10*3/uL    Eosinophils Absolute 0.19 0.00 - 0.40  x10*3/uL    Basophils Absolute 0.05 0.00 - 0.10 x10*3/uL   Morphology    Collection Time: 12/19/23 10:18 AM   Result Value Ref Range    RBC Morphology See Below     Hypochromia Mild     Target Cells Few     Ovalocytes Few     Teardrop Cells Few    CBC and Auto Differential    Collection Time: 12/26/23 12:32 PM   Result Value Ref Range    WBC 4.7 4.4 - 11.3 x10*3/uL    nRBC      RBC 1.96 (L) 4.00 - 5.20 x10*6/uL    Hemoglobin 6.4 (LL) 12.0 - 16.0 g/dL    Hematocrit 20.4 (L) 36.0 - 46.0 %     (H) 80 - 100 fL    MCH 32.7 26.0 - 34.0 pg    MCHC 31.4 (L) 32.0 - 36.0 g/dL    RDW 24.8 (H) 11.5 - 14.5 %    Platelets 280 150 - 450 x10*3/uL    Neutrophils % 62.4 40.0 - 80.0 %    Immature Granulocytes %, Automated 0.4 0.0 - 0.9 %    Lymphocytes % 25.1 13.0 - 44.0 %    Monocytes % 8.8 2.0 - 10.0 %    Eosinophils % 2.4 0.0 - 6.0 %    Basophils % 0.9 0.0 - 2.0 %    Neutrophils Absolute 2.92 1.60 - 5.50 x10*3/uL    Immature Granulocytes Absolute, Automated 0.02 0.00 - 0.50 x10*3/uL    Lymphocytes Absolute 1.17 0.80 - 3.00 x10*3/uL    Monocytes Absolute 0.41 0.05 - 0.80 x10*3/uL    Eosinophils Absolute 0.11 0.00 - 0.40 x10*3/uL    Basophils Absolute 0.04 0.00 - 0.10 x10*3/uL   Type and screen    Collection Time: 12/26/23 12:32 PM   Result Value Ref Range    ABO TYPE A     Rh TYPE POS     ANTIBODY SCREEN POS    Morphology    Collection Time: 12/26/23 12:32 PM   Result Value Ref Range    RBC Morphology See Below     Hypochromia Mild     Target Cells Few     Ovalocytes Few     Teardrop Cells Few    Antibody Identification    Collection Time: 12/26/23 12:32 PM   Result Value Ref Range    Antibody ID anti-E     CASE #     Prepare RBC: 2 Units, Irradiated, Leukocytes Reduced (CMV reduced risk)    Collection Time: 12/28/23  8:20 AM   Result Value Ref Range    PRODUCT CODE N9694I02     Unit Number [id number]     Unit ABO A     Unit RH POS     XM INTEP COMP     Dispense Status TR     Blood Expiration Date January 11, 2024 23:59  EST     PRODUCT BLOOD TYPE 6200     UNIT VOLUME 350     PRODUCT CODE O8922H68     Unit Number [id number]     Unit ABO O     Unit RH POS     XM INTEP COMP     Dispense Status TR     Blood Expiration Date January 09, 2024 23:59 EST     PRODUCT BLOOD TYPE 5100     UNIT VOLUME 350    CBC and Auto Differential    Collection Time: 01/02/24  1:13 PM   Result Value Ref Range    WBC 5.7 4.4 - 11.3 x10*3/uL    nRBC      RBC 2.89 (L) 4.00 - 5.20 x10*6/uL    Hemoglobin 9.0 (L) 12.0 - 16.0 g/dL    Hematocrit 28.3 (L) 36.0 - 46.0 %    MCV 98 80 - 100 fL    MCH 31.1 26.0 - 34.0 pg    MCHC 31.8 (L) 32.0 - 36.0 g/dL    RDW 24.0 (H) 11.5 - 14.5 %    Platelets 310 150 - 450 x10*3/uL    Neutrophils % 59.5 40.0 - 80.0 %    Immature Granulocytes %, Automated 0.2 0.0 - 0.9 %    Lymphocytes % 28.3 13.0 - 44.0 %    Monocytes % 8.8 2.0 - 10.0 %    Eosinophils % 2.3 0.0 - 6.0 %    Basophils % 0.9 0.0 - 2.0 %    Neutrophils Absolute 3.37 1.60 - 5.50 x10*3/uL    Immature Granulocytes Absolute, Automated 0.01 0.00 - 0.50 x10*3/uL    Lymphocytes Absolute 1.60 0.80 - 3.00 x10*3/uL    Monocytes Absolute 0.50 0.05 - 0.80 x10*3/uL    Eosinophils Absolute 0.13 0.00 - 0.40 x10*3/uL    Basophils Absolute 0.05 0.00 - 0.10 x10*3/uL   Morphology    Collection Time: 01/02/24  1:13 PM   Result Value Ref Range    RBC Morphology See Below     RBC Fragments Few     Ovalocytes Few    CBC and Auto Differential    Collection Time: 01/08/24  1:49 PM   Result Value Ref Range    WBC 5.7 4.4 - 11.3 x10*3/uL    nRBC      RBC 2.69 (L) 4.00 - 5.20 x10*6/uL    Hemoglobin 8.5 (L) 12.0 - 16.0 g/dL    Hematocrit 26.8 (L) 36.0 - 46.0 %     80 - 100 fL    MCH 31.6 26.0 - 34.0 pg    MCHC 31.7 (L) 32.0 - 36.0 g/dL    RDW 24.0 (H) 11.5 - 14.5 %    Platelets 306 150 - 450 x10*3/uL    Neutrophils % 66.9 40.0 - 80.0 %    Immature Granulocytes %, Automated 0.2 0.0 - 0.9 %    Lymphocytes % 22.6 13.0 - 44.0 %    Monocytes % 6.8 2.0 - 10.0 %    Eosinophils % 2.6 0.0 - 6.0 %     Basophils % 0.9 0.0 - 2.0 %    Neutrophils Absolute 3.83 1.60 - 5.50 x10*3/uL    Immature Granulocytes Absolute, Automated 0.01 0.00 - 0.50 x10*3/uL    Lymphocytes Absolute 1.29 0.80 - 3.00 x10*3/uL    Monocytes Absolute 0.39 0.05 - 0.80 x10*3/uL    Eosinophils Absolute 0.15 0.00 - 0.40 x10*3/uL    Basophils Absolute 0.05 0.00 - 0.10 x10*3/uL   Morphology    Collection Time: 01/08/24  1:49 PM   Result Value Ref Range    RBC Morphology See Below     Hypochromia Mild     Ovalocytes Few    CBC and Auto Differential    Collection Time: 01/15/24  1:17 PM   Result Value Ref Range    WBC 7.7 4.4 - 11.3 x10*3/uL    nRBC      RBC 2.50 (L) 4.00 - 5.20 x10*6/uL    Hemoglobin 7.9 (L) 12.0 - 16.0 g/dL    Hematocrit 24.8 (L) 36.0 - 46.0 %    MCV 99 80 - 100 fL    MCH 31.6 26.0 - 34.0 pg    MCHC 31.9 (L) 32.0 - 36.0 g/dL    RDW 23.9 (H) 11.5 - 14.5 %    Platelets 318 150 - 450 x10*3/uL    Neutrophils % 69.3 40.0 - 80.0 %    Immature Granulocytes %, Automated 0.3 0.0 - 0.9 %    Lymphocytes % 19.2 13.0 - 44.0 %    Monocytes % 8.5 2.0 - 10.0 %    Eosinophils % 2.2 0.0 - 6.0 %    Basophils % 0.5 0.0 - 2.0 %    Neutrophils Absolute 5.32 1.60 - 5.50 x10*3/uL    Immature Granulocytes Absolute, Automated 0.02 0.00 - 0.50 x10*3/uL    Lymphocytes Absolute 1.47 0.80 - 3.00 x10*3/uL    Monocytes Absolute 0.65 0.05 - 0.80 x10*3/uL    Eosinophils Absolute 0.17 0.00 - 0.40 x10*3/uL    Basophils Absolute 0.04 0.00 - 0.10 x10*3/uL   Morphology    Collection Time: 01/15/24  1:17 PM   Result Value Ref Range    RBC Morphology See Below     Hypochromia Mild     Ovalocytes Few     Teardrop Cells Few    CBC and Auto Differential    Collection Time: 01/22/24 10:36 AM   Result Value Ref Range    WBC 7.5 4.4 - 11.3 x10*3/uL    nRBC      RBC 2.38 (L) 4.00 - 5.20 x10*6/uL    Hemoglobin 7.6 (L) 12.0 - 16.0 g/dL    Hematocrit 24.0 (L) 36.0 - 46.0 %     (H) 80 - 100 fL    MCH 31.9 26.0 - 34.0 pg    MCHC 31.7 (L) 32.0 - 36.0 g/dL    RDW 24.6 (H) 11.5 -  14.5 %    Platelets 322 150 - 450 x10*3/uL    Neutrophils % 69.1 40.0 - 80.0 %    Immature Granulocytes %, Automated 0.3 0.0 - 0.9 %    Lymphocytes % 19.5 13.0 - 44.0 %    Monocytes % 7.4 2.0 - 10.0 %    Eosinophils % 2.6 0.0 - 6.0 %    Basophils % 1.1 0.0 - 2.0 %    Neutrophils Absolute 5.16 1.60 - 5.50 x10*3/uL    Immature Granulocytes Absolute, Automated 0.02 0.00 - 0.50 x10*3/uL    Lymphocytes Absolute 1.45 0.80 - 3.00 x10*3/uL    Monocytes Absolute 0.55 0.05 - 0.80 x10*3/uL    Eosinophils Absolute 0.19 0.00 - 0.40 x10*3/uL    Basophils Absolute 0.08 0.00 - 0.10 x10*3/uL   Morphology    Collection Time: 01/22/24 10:36 AM   Result Value Ref Range    RBC Morphology See Below     Hypochromia Mild     Target Cells Few     Ovalocytes Few     Teardrop Cells Few     Acanthocytes Few    CBC and Auto Differential    Collection Time: 01/29/24 11:30 AM   Result Value Ref Range    WBC 7.5 4.4 - 11.3 x10*3/uL    nRBC      RBC 2.40 (L) 4.00 - 5.20 x10*6/uL    Hemoglobin 7.5 (L) 12.0 - 16.0 g/dL    Hematocrit 23.9 (L) 36.0 - 46.0 %     80 - 100 fL    MCH 31.3 26.0 - 34.0 pg    MCHC 31.4 (L) 32.0 - 36.0 g/dL    RDW 25.4 (H) 11.5 - 14.5 %    Platelets 308 150 - 450 x10*3/uL    Neutrophils % 69.5 40.0 - 80.0 %    Immature Granulocytes %, Automated 0.3 0.0 - 0.9 %    Lymphocytes % 18.9 13.0 - 44.0 %    Monocytes % 7.9 2.0 - 10.0 %    Eosinophils % 2.3 0.0 - 6.0 %    Basophils % 1.1 0.0 - 2.0 %    Neutrophils Absolute 5.18 1.60 - 5.50 x10*3/uL    Immature Granulocytes Absolute, Automated 0.02 0.00 - 0.50 x10*3/uL    Lymphocytes Absolute 1.41 0.80 - 3.00 x10*3/uL    Monocytes Absolute 0.59 0.05 - 0.80 x10*3/uL    Eosinophils Absolute 0.17 0.00 - 0.40 x10*3/uL    Basophils Absolute 0.08 0.00 - 0.10 x10*3/uL   Morphology    Collection Time: 01/29/24 11:30 AM   Result Value Ref Range    RBC Morphology See Below     Hypochromia Mild     Ovalocytes Few     Teardrop Cells Few    CBC and Auto Differential    Collection Time:  02/06/24 11:21 AM   Result Value Ref Range    WBC 6.2 4.4 - 11.3 x10*3/uL    nRBC      RBC 2.28 (L) 4.00 - 5.20 x10*6/uL    Hemoglobin 7.0 (L) 12.0 - 16.0 g/dL    Hematocrit 22.8 (L) 36.0 - 46.0 %     80 - 100 fL    MCH 30.7 26.0 - 34.0 pg    MCHC 30.7 (L) 32.0 - 36.0 g/dL    RDW 25.5 (H) 11.5 - 14.5 %    Platelets 366 150 - 450 x10*3/uL    Neutrophils % 63.1 40.0 - 80.0 %    Immature Granulocytes %, Automated 0.2 0.0 - 0.9 %    Lymphocytes % 22.2 13.0 - 44.0 %    Monocytes % 9.5 2.0 - 10.0 %    Eosinophils % 4.0 0.0 - 6.0 %    Basophils % 1.0 0.0 - 2.0 %    Neutrophils Absolute 3.93 1.60 - 5.50 x10*3/uL    Immature Granulocytes Absolute, Automated 0.01 0.00 - 0.50 x10*3/uL    Lymphocytes Absolute 1.38 0.80 - 3.00 x10*3/uL    Monocytes Absolute 0.59 0.05 - 0.80 x10*3/uL    Eosinophils Absolute 0.25 0.00 - 0.40 x10*3/uL    Basophils Absolute 0.06 0.00 - 0.10 x10*3/uL   Morphology    Collection Time: 02/06/24 11:21 AM   Result Value Ref Range    RBC Morphology See Below     Polychromasia Mild     Hypochromia Mild     Ovalocytes Many     Teardrop Cells Few     Basophilic Stippling Present    Type And Screen    Collection Time: 02/06/24 11:21 AM   Result Value Ref Range    ABO TYPE A     Rh TYPE POS     ANTIBODY SCREEN NEG    Prepare RBC: 1 Units    Collection Time: 02/06/24  3:58 PM   Result Value Ref Range    PRODUCT CODE [id number]     Unit Number [id number]     Unit ABO A     Unit RH NEG     XM INTEP COMP     Dispense Status TR     Blood Expiration Date February 13, 2024 23:59 EST     PRODUCT BLOOD TYPE 0600     UNIT VOLUME 279    ECG 12 lead    Collection Time: 02/07/24 11:42 PM   Result Value Ref Range    Ventricular Rate 84 BPM    Atrial Rate 84 BPM    DE Interval 168 ms    QRS Duration 83 ms    QT Interval 365 ms    QTC Calculation(Bazett) 432 ms    P Axis 39 degrees    R Axis 12 degrees    T Axis 38 degrees    QRS Count 14 beats    Q Onset 249 ms    T Offset 432 ms    QTC Fredericia 408 ms   CBC and  Auto Differential    Collection Time: 02/07/24 11:57 PM   Result Value Ref Range    WBC 8.7 4.4 - 11.3 x10*3/uL    nRBC 0.2 (H) 0.0 - 0.0 /100 WBCs    RBC 2.51 (L) 4.00 - 5.20 x10*6/uL    Hemoglobin 7.7 (L) 12.0 - 16.0 g/dL    Hematocrit 23.9 (L) 36.0 - 46.0 %    MCV 95 80 - 100 fL    MCH 30.7 26.0 - 34.0 pg    MCHC 32.2 32.0 - 36.0 g/dL    RDW 26.2 (H) 11.5 - 14.5 %    Platelets 342 150 - 450 x10*3/uL    Neutrophils % 69.0 40.0 - 80.0 %    Immature Granulocytes %, Automated 0.6 0.0 - 0.9 %    Lymphocytes % 17.8 13.0 - 44.0 %    Monocytes % 9.0 2.0 - 10.0 %    Eosinophils % 3.3 0.0 - 6.0 %    Basophils % 0.3 0.0 - 2.0 %    Neutrophils Absolute 6.02 (H) 1.60 - 5.50 x10*3/uL    Immature Granulocytes Absolute, Automated 0.05 0.00 - 0.50 x10*3/uL    Lymphocytes Absolute 1.55 0.80 - 3.00 x10*3/uL    Monocytes Absolute 0.79 0.05 - 0.80 x10*3/uL    Eosinophils Absolute 0.29 0.00 - 0.40 x10*3/uL    Basophils Absolute 0.03 0.00 - 0.10 x10*3/uL   Comprehensive metabolic panel    Collection Time: 02/07/24 11:57 PM   Result Value Ref Range    Glucose 133 (H) 74 - 99 mg/dL    Sodium 136 136 - 145 mmol/L    Potassium 6.1 (HH) 3.5 - 5.3 mmol/L    Chloride 113 (H) 98 - 107 mmol/L    Bicarbonate 16 (L) 21 - 32 mmol/L    Anion Gap 13 10 - 20 mmol/L    Urea Nitrogen 65 (H) 6 - 23 mg/dL    Creatinine 3.38 (H) 0.50 - 1.05 mg/dL    eGFR 13 (L) >60 mL/min/1.73m*2    Calcium 9.4 8.6 - 10.3 mg/dL    Albumin 3.7 3.4 - 5.0 g/dL    Alkaline Phosphatase 63 33 - 136 U/L    Total Protein 6.5 6.4 - 8.2 g/dL    AST 13 9 - 39 U/L    Bilirubin, Total 0.4 0.0 - 1.2 mg/dL    ALT 9 7 - 45 U/L   Troponin I, High Sensitivity    Collection Time: 02/07/24 11:57 PM   Result Value Ref Range    Troponin I, High Sensitivity 12 0 - 13 ng/L   B-Type Natriuretic Peptide    Collection Time: 02/07/24 11:57 PM   Result Value Ref Range     (H) 0 - 99 pg/mL   Blood Culture    Collection Time: 02/07/24 11:57 PM    Specimen: Peripheral Venipuncture; Blood culture    Result Value Ref Range    Blood Culture No growth at 4 days -  FINAL REPORT    Morphology    Collection Time: 02/07/24 11:57 PM   Result Value Ref Range    RBC Morphology See Below     Polychromasia Mild     Hypochromia Mild     Ovalocytes Few     Teardrop Cells Few     Basophilic Stippling Present    Urinalysis with Reflex Culture and Microscopic    Collection Time: 02/08/24 12:33 AM   Result Value Ref Range    Color, Urine Yellow Straw, Yellow    Appearance, Urine Clear Clear    Specific Gravity, Urine 1.009 1.005 - 1.035    pH, Urine 5.0 5.0, 5.5, 6.0, 6.5, 7.0, 7.5, 8.0    Protein, Urine 100 (2+) (N) NEGATIVE mg/dL    Glucose, Urine NEGATIVE NEGATIVE mg/dL    Blood, Urine NEGATIVE NEGATIVE    Ketones, Urine NEGATIVE NEGATIVE mg/dL    Bilirubin, Urine NEGATIVE NEGATIVE    Urobilinogen, Urine <2.0 <2.0 mg/dL    Nitrite, Urine NEGATIVE NEGATIVE    Leukocyte Esterase, Urine NEGATIVE NEGATIVE   Extra Urine Gray Tube    Collection Time: 02/08/24 12:33 AM   Result Value Ref Range    Extra Tube Hold for add-ons.    Urinalysis Microscopic    Collection Time: 02/08/24 12:33 AM   Result Value Ref Range    WBC, Urine 21-50 (A) 1-5, NONE /HPF    RBC, Urine NONE NONE, 1-2, 3-5 /HPF    Bacteria, Urine 2+ (A) NONE SEEN /HPF   Urine Culture    Collection Time: 02/08/24 12:33 AM    Specimen: Clean Catch/Voided; Urine   Result Value Ref Range    Urine Culture >100,000 Klebsiella pneumoniae/variicola (A)        Susceptibility    Klebsiella pneumoniae/variicola - MICROSCAN     Ampicillin  Resistant      Amoxicillin/Clavulanate  Susceptible      Ampicillin/Sulbactam  Susceptible      Cefazolin  Susceptible      Cefazolin (uncomplicated UTIs only)  Susceptible      Ciprofloxacin  Susceptible      Gentamicin  Susceptible      Nitrofurantoin  Susceptible      Piperacillin/Tazobactam  Susceptible      Trimethoprim/Sulfamethoxazole  Susceptible    Blood Culture    Collection Time: 02/08/24 12:43 AM    Specimen: Peripheral Venipuncture;  Blood culture   Result Value Ref Range    Blood Culture No growth at 4 days -  FINAL REPORT    Influenza A, and B PCR    Collection Time: 02/08/24  1:29 AM   Result Value Ref Range    Flu A Result Not Detected Not Detected    Flu B Result Not Detected Not Detected   Sars-CoV-2 PCR    Collection Time: 02/08/24  1:29 AM   Result Value Ref Range    Coronavirus 2019, PCR Not Detected Not Detected   Blood Gas Venous Full Panel    Collection Time: 02/08/24  3:25 AM   Result Value Ref Range    POCT pH, Venous 7.34 7.33 - 7.43 pH    POCT pCO2, Venous 35 (L) 41 - 51 mm Hg    POCT pO2, Venous 55 (H) 35 - 45 mm Hg    POCT SO2, Venous 87 (H) 45 - 75 %    POCT Oxy Hemoglobin, Venous 85.1 (H) 45.0 - 75.0 %    POCT Hematocrit Calculated, Venous 23.0 (L) 36.0 - 46.0 %    POCT Sodium, Venous 137 136 - 145 mmol/L    POCT Potassium, Venous 6.0 (H) 3.5 - 5.3 mmol/L    POCT Chloride, Venous 114 (H) 98 - 107 mmol/L    POCT Ionized Calicum, Venous 1.41 (H) 1.10 - 1.33 mmol/L    POCT Glucose, Venous 95 74 - 99 mg/dL    POCT Lactate, Venous 1.0 0.4 - 2.0 mmol/L    POCT Base Excess, Venous -6.3 (L) -2.0 - 3.0 mmol/L    POCT HCO3 Calculated, Venous 18.9 (L) 22.0 - 26.0 mmol/L    POCT Hemoglobin, Venous 7.5 (L) 12.0 - 16.0 g/dL    POCT Anion Gap, Venous 10.0 10.0 - 25.0 mmol/L    Patient Temperature 37.0 degrees Celsius    FiO2 21 %   Potassium    Collection Time: 02/08/24  5:48 AM   Result Value Ref Range    Potassium 6.0 (H) 3.5 - 5.3 mmol/L   Basic metabolic panel    Collection Time: 02/08/24  5:48 AM   Result Value Ref Range    Glucose 121 (H) 74 - 99 mg/dL    Sodium 139 136 - 145 mmol/L    Potassium 6.0 (H) 3.5 - 5.3 mmol/L    Chloride 113 (H) 98 - 107 mmol/L    Bicarbonate 19 (L) 21 - 32 mmol/L    Anion Gap 13 10 - 20 mmol/L    Urea Nitrogen 68 (H) 6 - 23 mg/dL    Creatinine 3.37 (H) 0.50 - 1.05 mg/dL    eGFR 13 (L) >60 mL/min/1.73m*2    Calcium 9.5 8.6 - 10.3 mg/dL   Basic metabolic panel    Collection Time: 02/08/24  8:08 AM   Result  Value Ref Range    Glucose 114 (H) 74 - 99 mg/dL    Sodium 137 136 - 145 mmol/L    Potassium 6.0 (H) 3.5 - 5.3 mmol/L    Chloride 114 (H) 98 - 107 mmol/L    Bicarbonate 21 21 - 32 mmol/L    Anion Gap 8 (L) 10 - 20 mmol/L    Urea Nitrogen 64 (H) 6 - 23 mg/dL    Creatinine 3.19 (H) 0.50 - 1.05 mg/dL    eGFR 14 (L) >60 mL/min/1.73m*2    Calcium 9.1 8.6 - 10.3 mg/dL   Basic metabolic panel    Collection Time: 02/08/24  3:32 PM   Result Value Ref Range    Glucose 97 74 - 99 mg/dL    Sodium 139 136 - 145 mmol/L    Potassium 5.0 3.5 - 5.3 mmol/L    Chloride 110 (H) 98 - 107 mmol/L    Bicarbonate 24 21 - 32 mmol/L    Anion Gap 10 10 - 20 mmol/L    Urea Nitrogen 59 (H) 6 - 23 mg/dL    Creatinine 2.88 (H) 0.50 - 1.05 mg/dL    eGFR 16 (L) >60 mL/min/1.73m*2    Calcium 9.4 8.6 - 10.3 mg/dL   CBC and Auto Differential    Collection Time: 02/09/24  3:48 AM   Result Value Ref Range    WBC 5.4 4.4 - 11.3 x10*3/uL    nRBC 0.4 (H) 0.0 - 0.0 /100 WBCs    RBC 2.27 (L) 4.00 - 5.20 x10*6/uL    Hemoglobin 7.0 (L) 12.0 - 16.0 g/dL    Hematocrit 21.8 (L) 36.0 - 46.0 %    MCV 96 80 - 100 fL    MCH 30.8 26.0 - 34.0 pg    MCHC 32.1 32.0 - 36.0 g/dL    RDW 26.0 (H) 11.5 - 14.5 %    Platelets 294 150 - 450 x10*3/uL    Neutrophils % 50.7 40.0 - 80.0 %    Immature Granulocytes %, Automated 0.2 0.0 - 0.9 %    Lymphocytes % 32.5 13.0 - 44.0 %    Monocytes % 10.6 2.0 - 10.0 %    Eosinophils % 5.4 0.0 - 6.0 %    Basophils % 0.6 0.0 - 2.0 %    Neutrophils Absolute 2.74 1.60 - 5.50 x10*3/uL    Immature Granulocytes Absolute, Automated 0.01 0.00 - 0.50 x10*3/uL    Lymphocytes Absolute 1.75 0.80 - 3.00 x10*3/uL    Monocytes Absolute 0.57 0.05 - 0.80 x10*3/uL    Eosinophils Absolute 0.29 0.00 - 0.40 x10*3/uL    Basophils Absolute 0.03 0.00 - 0.10 x10*3/uL   Basic Metabolic Panel    Collection Time: 02/09/24  3:48 AM   Result Value Ref Range    Glucose 106 (H) 74 - 99 mg/dL    Sodium 140 136 - 145 mmol/L    Potassium 4.9 3.5 - 5.3 mmol/L    Chloride 111 (H)  98 - 107 mmol/L    Bicarbonate 23 21 - 32 mmol/L    Anion Gap 11 10 - 20 mmol/L    Urea Nitrogen 50 (H) 6 - 23 mg/dL    Creatinine 2.72 (H) 0.50 - 1.05 mg/dL    eGFR 17 (L) >60 mL/min/1.73m*2    Calcium 8.7 8.6 - 10.3 mg/dL   Magnesium    Collection Time: 02/09/24  3:48 AM   Result Value Ref Range    Magnesium 1.22 (L) 1.60 - 2.40 mg/dL   Morphology    Collection Time: 02/09/24  3:48 AM   Result Value Ref Range    RBC Morphology See Below     Polychromasia Mild     Hypochromia Mild     Ovalocytes Few     Teardrop Cells Few     Basophilic Stippling Present    CBC and Auto Differential    Collection Time: 02/10/24  4:43 AM   Result Value Ref Range    WBC 6.3 4.4 - 11.3 x10*3/uL    nRBC 0.5 (H) 0.0 - 0.0 /100 WBCs    RBC 2.38 (L) 4.00 - 5.20 x10*6/uL    Hemoglobin 7.2 (L) 12.0 - 16.0 g/dL    Hematocrit 23.0 (L) 36.0 - 46.0 %    MCV 97 80 - 100 fL    MCH 30.3 26.0 - 34.0 pg    MCHC 31.3 (L) 32.0 - 36.0 g/dL    RDW 25.2 (H) 11.5 - 14.5 %    Platelets 314 150 - 450 x10*3/uL    Neutrophils % 64.3 40.0 - 80.0 %    Immature Granulocytes %, Automated 1.1 (H) 0.0 - 0.9 %    Lymphocytes % 21.0 13.0 - 44.0 %    Monocytes % 9.2 2.0 - 10.0 %    Eosinophils % 3.8 0.0 - 6.0 %    Basophils % 0.6 0.0 - 2.0 %    Neutrophils Absolute 4.04 1.60 - 5.50 x10*3/uL    Immature Granulocytes Absolute, Automated 0.07 0.00 - 0.50 x10*3/uL    Lymphocytes Absolute 1.32 0.80 - 3.00 x10*3/uL    Monocytes Absolute 0.58 0.05 - 0.80 x10*3/uL    Eosinophils Absolute 0.24 0.00 - 0.40 x10*3/uL    Basophils Absolute 0.04 0.00 - 0.10 x10*3/uL   Basic Metabolic Panel    Collection Time: 02/10/24  4:43 AM   Result Value Ref Range    Glucose 126 (H) 74 - 99 mg/dL    Sodium 139 136 - 145 mmol/L    Potassium 4.2 3.5 - 5.3 mmol/L    Chloride 110 (H) 98 - 107 mmol/L    Bicarbonate 22 21 - 32 mmol/L    Anion Gap 11 10 - 20 mmol/L    Urea Nitrogen 49 (H) 6 - 23 mg/dL    Creatinine 2.54 (H) 0.50 - 1.05 mg/dL    eGFR 19 (L) >60 mL/min/1.73m*2    Calcium 9.2 8.6 - 10.3  mg/dL   Magnesium    Collection Time: 02/10/24  4:43 AM   Result Value Ref Range    Magnesium 1.65 1.60 - 2.40 mg/dL   Morphology    Collection Time: 02/10/24  4:43 AM   Result Value Ref Range    RBC Morphology See Below     Polychromasia Mild     Hypochromia Mild     Ovalocytes Few     Teardrop Cells Few     Basophilic Stippling Present    CBC    Collection Time: 02/11/24  5:13 AM   Result Value Ref Range    WBC 6.5 4.4 - 11.3 x10*3/uL    nRBC 0.5 (H) 0.0 - 0.0 /100 WBCs    RBC 2.45 (L) 4.00 - 5.20 x10*6/uL    Hemoglobin 7.4 (L) 12.0 - 16.0 g/dL    Hematocrit 23.4 (L) 36.0 - 46.0 %    MCV 96 80 - 100 fL    MCH 30.2 26.0 - 34.0 pg    MCHC 31.6 (L) 32.0 - 36.0 g/dL    RDW 24.3 (H) 11.5 - 14.5 %    Platelets 315 150 - 450 x10*3/uL   Basic Metabolic Panel    Collection Time: 02/11/24  5:13 AM   Result Value Ref Range    Glucose 122 (H) 74 - 99 mg/dL    Sodium 139 136 - 145 mmol/L    Potassium 4.1 3.5 - 5.3 mmol/L    Chloride 108 (H) 98 - 107 mmol/L    Bicarbonate 23 21 - 32 mmol/L    Anion Gap 12 10 - 20 mmol/L    Urea Nitrogen 46 (H) 6 - 23 mg/dL    Creatinine 2.65 (H) 0.50 - 1.05 mg/dL    eGFR 18 (L) >60 mL/min/1.73m*2    Calcium 9.4 8.6 - 10.3 mg/dL   CBC and Auto Differential    Collection Time: 02/13/24 12:27 PM   Result Value Ref Range    WBC 8.7 4.4 - 11.3 x10*3/uL    nRBC      RBC 2.83 (L) 4.00 - 5.20 x10*6/uL    Hemoglobin 8.4 (L) 12.0 - 16.0 g/dL    Hematocrit 27.5 (L) 36.0 - 46.0 %    MCV 97 80 - 100 fL    MCH 29.7 26.0 - 34.0 pg    MCHC 30.5 (L) 32.0 - 36.0 g/dL    RDW 24.8 (H) 11.5 - 14.5 %    Platelets 371 150 - 450 x10*3/uL    Neutrophils % 62.8 40.0 - 80.0 %    Immature Granulocytes %, Automated 0.6 0.0 - 0.9 %    Lymphocytes % 22.5 13.0 - 44.0 %    Monocytes % 8.7 2.0 - 10.0 %    Eosinophils % 4.4 0.0 - 6.0 %    Basophils % 1.0 0.0 - 2.0 %    Neutrophils Absolute 5.45 1.60 - 5.50 x10*3/uL    Immature Granulocytes Absolute, Automated 0.05 0.00 - 0.50 x10*3/uL    Lymphocytes Absolute 1.95 0.80 - 3.00  x10*3/uL    Monocytes Absolute 0.75 0.05 - 0.80 x10*3/uL    Eosinophils Absolute 0.38 0.00 - 0.40 x10*3/uL    Basophils Absolute 0.09 0.00 - 0.10 x10*3/uL   Morphology    Collection Time: 02/13/24 12:27 PM   Result Value Ref Range    RBC Morphology See Below     Polychromasia Mild     Hypochromia Mild     Ovalocytes Few     Teardrop Cells Few    CBC and Auto Differential    Collection Time: 02/14/24  5:58 PM   Result Value Ref Range    WBC 16.6 (H) 4.4 - 11.3 x10*3/uL    nRBC 0.8 (H) 0.0 - 0.0 /100 WBCs    RBC 2.76 (L) 4.00 - 5.20 x10*6/uL    Hemoglobin 8.3 (L) 12.0 - 16.0 g/dL    Hematocrit 27.1 (L) 36.0 - 46.0 %    MCV 98 80 - 100 fL    MCH 30.1 26.0 - 34.0 pg    MCHC 30.6 (L) 32.0 - 36.0 g/dL    RDW 25.4 (H) 11.5 - 14.5 %    Platelets 335 150 - 450 x10*3/uL    Neutrophils % 85.5 40.0 - 80.0 %    Immature Granulocytes %, Automated 0.9 0.0 - 0.9 %    Lymphocytes % 6.1 13.0 - 44.0 %    Monocytes % 6.4 2.0 - 10.0 %    Eosinophils % 0.6 0.0 - 6.0 %    Basophils % 0.5 0.0 - 2.0 %    Neutrophils Absolute 14.22 (H) 1.60 - 5.50 x10*3/uL    Immature Granulocytes Absolute, Automated 0.15 0.00 - 0.50 x10*3/uL    Lymphocytes Absolute 1.02 0.80 - 3.00 x10*3/uL    Monocytes Absolute 1.06 (H) 0.05 - 0.80 x10*3/uL    Eosinophils Absolute 0.10 0.00 - 0.40 x10*3/uL    Basophils Absolute 0.08 0.00 - 0.10 x10*3/uL   Magnesium    Collection Time: 02/14/24  5:58 PM   Result Value Ref Range    Magnesium 1.77 1.60 - 2.40 mg/dL   Phosphorus    Collection Time: 02/14/24  5:58 PM   Result Value Ref Range    Phosphorus 4.3 2.5 - 4.9 mg/dL   Comprehensive metabolic panel    Collection Time: 02/14/24  5:58 PM   Result Value Ref Range    Glucose 205 (H) 74 - 99 mg/dL    Sodium 136 136 - 145 mmol/L    Potassium 4.3 3.5 - 5.3 mmol/L    Chloride 106 98 - 107 mmol/L    Bicarbonate 19 (L) 21 - 32 mmol/L    Anion Gap 15 10 - 20 mmol/L    Urea Nitrogen 54 (H) 6 - 23 mg/dL    Creatinine 3.31 (H) 0.50 - 1.05 mg/dL    eGFR 14 (L) >60 mL/min/1.73m*2     Calcium 9.4 8.6 - 10.3 mg/dL    Albumin 4.0 3.4 - 5.0 g/dL    Alkaline Phosphatase 66 33 - 136 U/L    Total Protein 6.8 6.4 - 8.2 g/dL    AST 19 9 - 39 U/L    Bilirubin, Total 0.6 0.0 - 1.2 mg/dL    ALT 11 7 - 45 U/L   Lactate    Collection Time: 02/14/24  5:58 PM   Result Value Ref Range    Lactate 1.5 0.4 - 2.0 mmol/L   Troponin I, High Sensitivity    Collection Time: 02/14/24  5:58 PM   Result Value Ref Range    Troponin I, High Sensitivity 15 (H) 0 - 13 ng/L   Sedimentation rate, automated    Collection Time: 02/14/24  5:58 PM   Result Value Ref Range    Sedimentation Rate 63 (H) 0 - 30 mm/h   C-reactive protein    Collection Time: 02/14/24  5:58 PM   Result Value Ref Range    C-Reactive Protein 7.93 (H) <1.00 mg/dL   Protime-INR    Collection Time: 02/14/24  5:58 PM   Result Value Ref Range    Protime 14.4 (H) 9.8 - 12.8 seconds    INR 1.3 (H) 0.9 - 1.1   Type and Screen    Collection Time: 02/14/24  5:58 PM   Result Value Ref Range    ABO TYPE A     Rh TYPE POS     ANTIBODY SCREEN NEG    Sars-CoV-2 and Influenza A/B PCR    Collection Time: 02/14/24  5:58 PM   Result Value Ref Range    Flu A Result Not Detected Not Detected    Flu B Result Not Detected Not Detected    Coronavirus 2019, PCR Not Detected Not Detected   RSV PCR    Collection Time: 02/14/24  5:58 PM   Result Value Ref Range    RSV PCR Not Detected Not Detected   Blood Culture    Collection Time: 02/14/24  5:58 PM    Specimen: Peripheral Venipuncture; Blood culture   Result Value Ref Range    Blood Culture No growth at 4 days -  FINAL REPORT    Blood Culture    Collection Time: 02/14/24  5:58 PM    Specimen: Peripheral Venipuncture; Blood culture   Result Value Ref Range    Blood Culture No growth at 4 days -  FINAL REPORT    Morphology    Collection Time: 02/14/24  5:58 PM   Result Value Ref Range    RBC Morphology See Below     Polychromasia Mild     Hypochromia Mild     Ovalocytes Few     Teardrop Cells Few    B-Type Natriuretic Peptide     Collection Time: 02/14/24  5:58 PM   Result Value Ref Range     (H) 0 - 99 pg/mL   ECG 12 lead    Collection Time: 02/14/24  6:08 PM   Result Value Ref Range    Ventricular Rate 89 BPM    Atrial Rate 89 BPM    AL Interval 159 ms    QRS Duration 85 ms    QT Interval 369 ms    QTC Calculation(Bazett) 449 ms    P Axis 49 degrees    R Axis -3 degrees    T Axis 43 degrees    QRS Count 14 beats    Q Onset 251 ms    T Offset 436 ms    QTC Fredericia 420 ms   POCT GLUCOSE    Collection Time: 02/14/24  9:17 PM   Result Value Ref Range    POCT Glucose 166 (H) 74 - 99 mg/dL   Troponin I, High Sensitivity    Collection Time: 02/14/24 10:57 PM   Result Value Ref Range    Troponin I, High Sensitivity 16 (H) 0 - 13 ng/L   CBC    Collection Time: 02/15/24  5:55 AM   Result Value Ref Range    WBC 7.7 4.4 - 11.3 x10*3/uL    nRBC 0.9 (H) 0.0 - 0.0 /100 WBCs    RBC 2.26 (L) 4.00 - 5.20 x10*6/uL    Hemoglobin 6.8 (L) 12.0 - 16.0 g/dL    Hematocrit 22.0 (L) 36.0 - 46.0 %    MCV 97 80 - 100 fL    MCH 30.1 26.0 - 34.0 pg    MCHC 30.9 (L) 32.0 - 36.0 g/dL    RDW 25.2 (H) 11.5 - 14.5 %    Platelets 259 150 - 450 x10*3/uL   Basic Metabolic Panel    Collection Time: 02/15/24  5:55 AM   Result Value Ref Range    Glucose 120 (H) 74 - 99 mg/dL    Sodium 139 136 - 145 mmol/L    Potassium 4.1 3.5 - 5.3 mmol/L    Chloride 110 (H) 98 - 107 mmol/L    Bicarbonate 21 21 - 32 mmol/L    Anion Gap 12 10 - 20 mmol/L    Urea Nitrogen 55 (H) 6 - 23 mg/dL    Creatinine 3.33 (H) 0.50 - 1.05 mg/dL    eGFR 14 (L) >60 mL/min/1.73m*2    Calcium 8.5 (L) 8.6 - 10.3 mg/dL   Magnesium    Collection Time: 02/15/24  5:55 AM   Result Value Ref Range    Magnesium 1.68 1.60 - 2.40 mg/dL   Osmolality    Collection Time: 02/15/24  5:55 AM   Result Value Ref Range    Osmolality, Serum 309 (H) 280 - 300 mOsm/kg   Uric Acid    Collection Time: 02/15/24  5:55 AM   Result Value Ref Range    Uric Acid 8.6 (H) 2.3 - 6.7 mg/dL   Urinalysis with Reflex Microscopic    Collection  Time: 02/15/24  6:00 AM   Result Value Ref Range    Color, Urine Yellow Straw, Yellow    Appearance, Urine Hazy (N) Clear    Specific Gravity, Urine 1.008 1.005 - 1.035    pH, Urine 5.0 5.0, 5.5, 6.0, 6.5, 7.0, 7.5, 8.0    Protein, Urine 100 (2+) (N) NEGATIVE mg/dL    Glucose, Urine NEGATIVE NEGATIVE mg/dL    Blood, Urine NEGATIVE NEGATIVE    Ketones, Urine NEGATIVE NEGATIVE mg/dL    Bilirubin, Urine NEGATIVE NEGATIVE    Urobilinogen, Urine <2.0 <2.0 mg/dL    Nitrite, Urine NEGATIVE NEGATIVE    Leukocyte Esterase, Urine SMALL (1+) (A) NEGATIVE   Osmolality, Urine    Collection Time: 02/15/24  6:00 AM   Result Value Ref Range    Osmolality, Urine Random 315 200 - 1,200 mOsm/kg   Sodium, Urine Random    Collection Time: 02/15/24  6:00 AM   Result Value Ref Range    Sodium, Urine Random 83 mmol/L    Creatinine, Urine Random 57.7 20.0 - 320.0 mg/dL    Sodium/Creatinine Ratio 144 Not established. mmol/g Creat   Electrolyte Panel, Urine    Collection Time: 02/15/24  6:00 AM   Result Value Ref Range    Sodium, Urine Random 83 mmol/L    Sodium/Creatinine Ratio 144 Not established. mmol/g Creat    Potassium, Urine Random 17 mmol/L    Potassium/Creatinine Ratio 29 Not established mmol/g Creat    Chloride, Urine Random 80 mmol/L    Chloride/Creatinine Ratio 139 38 - 318 mmol/g creat    Creatinine, Urine Random 57.7 20.0 - 320.0 mg/dL   Microscopic Only, Urine    Collection Time: 02/15/24  6:00 AM   Result Value Ref Range    WBC, Urine 21-50 (A) 1-5, NONE /HPF    RBC, Urine 3-5 NONE, 1-2, 3-5 /HPF    Squamous Epithelial Cells, Urine 1-9 (SPARSE) Reference range not established. /HPF    Bacteria, Urine 1+ (A) NONE SEEN /HPF   POCT GLUCOSE    Collection Time: 02/15/24  6:57 AM   Result Value Ref Range    POCT Glucose 113 (H) 74 - 99 mg/dL   POCT GLUCOSE    Collection Time: 02/15/24 11:34 AM   Result Value Ref Range    POCT Glucose 156 (H) 74 - 99 mg/dL   Transthoracic Echo (TTE) Complete    Collection Time: 02/15/24  2:13 PM    Result Value Ref Range    LVOT diam 1.80 cm    LV EF 67 %    MV E/A ratio 0.84     MV avg E/e' ratio 13.48     Tricuspid annular plane systolic excursion 2.2 cm    LA vol index A/L 45.2 ml/m2    RV free wall pk S' 13.00 cm/s    RVSP 38.8 mmHg    LVIDd 4.30 cm    LV A4C EF 69.1    POCT GLUCOSE    Collection Time: 02/15/24  4:20 PM   Result Value Ref Range    POCT Glucose 88 74 - 99 mg/dL   Vancomycin    Collection Time: 02/15/24  8:01 PM   Result Value Ref Range    Vancomycin 10.7 5.0 - 20.0 ug/mL   POCT GLUCOSE    Collection Time: 02/15/24  8:46 PM   Result Value Ref Range    POCT Glucose 137 (H) 74 - 99 mg/dL   Basic Metabolic Panel    Collection Time: 02/16/24  4:01 AM   Result Value Ref Range    Glucose 112 (H) 74 - 99 mg/dL    Sodium 138 136 - 145 mmol/L    Potassium 4.1 3.5 - 5.3 mmol/L    Chloride 109 (H) 98 - 107 mmol/L    Bicarbonate 21 21 - 32 mmol/L    Anion Gap 12 10 - 20 mmol/L    Urea Nitrogen 56 (H) 6 - 23 mg/dL    Creatinine 3.37 (H) 0.50 - 1.05 mg/dL    eGFR 13 (L) >60 mL/min/1.73m*2    Calcium 8.9 8.6 - 10.3 mg/dL   CBC    Collection Time: 02/16/24  4:01 AM   Result Value Ref Range    WBC 9.4 4.4 - 11.3 x10*3/uL    nRBC 0.6 (H) 0.0 - 0.0 /100 WBCs    RBC 2.43 (L) 4.00 - 5.20 x10*6/uL    Hemoglobin 7.3 (L) 12.0 - 16.0 g/dL    Hematocrit 23.2 (L) 36.0 - 46.0 %    MCV 96 80 - 100 fL    MCH 30.0 26.0 - 34.0 pg    MCHC 31.5 (L) 32.0 - 36.0 g/dL    RDW 25.1 (H) 11.5 - 14.5 %    Platelets 273 150 - 450 x10*3/uL   POCT GLUCOSE    Collection Time: 02/16/24  6:48 AM   Result Value Ref Range    POCT Glucose 114 (H) 74 - 99 mg/dL   POCT GLUCOSE    Collection Time: 02/16/24 10:49 AM   Result Value Ref Range    POCT Glucose 176 (H) 74 - 99 mg/dL   Tissue/Wound Culture/Smear    Collection Time: 02/16/24 12:36 PM    Specimen: Wound/Tissue; Tissue/Biopsy   Result Value Ref Range    Tissue/Wound Culture/Smear (2+) Few Corynebacterium striatum group (A)     Tissue/Wound Culture/Smear (A)      (1+) Rare Methicillin  Susceptible Staphylococcus aureus (MSSA)    Gram Stain (1+) Rare Polymorphonuclear leukocytes     Gram Stain No organisms seen        Susceptibility    Methicillin Susceptible Staphylococcus aureus (MSSA) - MICROSCAN     Clindamycin  Resistant      Erythromycin  Resistant      Oxacillin  Susceptible      Tetracycline  Susceptible      Trimethoprim/Sulfamethoxazole  Susceptible      Vancomycin  Susceptible     Corynebacterium striatum group - GRADIENT DIFFUSION     Penicillin  Resistant      Vancomycin  Susceptible      Ciprofloxacin  Resistant      Ceftriaxone  Resistant      Gentamicin  Susceptible    Tissue/Wound Culture/Smear    Collection Time: 02/16/24  3:33 PM    Specimen: Skin/Superficial Abscess; Tissue/Biopsy   Result Value Ref Range    Tissue/Wound Culture/Smear No growth aerobically and anaerobically     Gram Stain No polymorphonuclear leukocytes seen     Gram Stain No organisms seen    POCT GLUCOSE    Collection Time: 02/16/24  4:13 PM   Result Value Ref Range    POCT Glucose 97 74 - 99 mg/dL   Crystal Identification, Synovial Fluid    Collection Time: 02/16/24  4:24 PM   Result Value Ref Range    Crystal Identification, Synovial Fluid  No crystals seen by bright-field or first order red axis polarization microscopy.     No crystals seen by bright-field or first order red axis polarization microscopy.   Vancomycin    Collection Time: 02/16/24  6:12 PM   Result Value Ref Range    Vancomycin 12.3 5.0 - 20.0 ug/mL   POCT GLUCOSE    Collection Time: 02/16/24  9:43 PM   Result Value Ref Range    POCT Glucose 179 (H) 74 - 99 mg/dL   Basic Metabolic Panel    Collection Time: 02/17/24  4:25 AM   Result Value Ref Range    Glucose 101 (H) 74 - 99 mg/dL    Sodium 137 136 - 145 mmol/L    Potassium 4.2 3.5 - 5.3 mmol/L    Chloride 109 (H) 98 - 107 mmol/L    Bicarbonate 21 21 - 32 mmol/L    Anion Gap 11 10 - 20 mmol/L    Urea Nitrogen 58 (H) 6 - 23 mg/dL    Creatinine 3.06 (H) 0.50 - 1.05 mg/dL    eGFR 15 (L) >60  mL/min/1.73m*2    Calcium 8.9 8.6 - 10.3 mg/dL   POCT GLUCOSE    Collection Time: 02/17/24  7:08 AM   Result Value Ref Range    POCT Glucose 114 (H) 74 - 99 mg/dL   POCT GLUCOSE    Collection Time: 02/17/24 11:15 AM   Result Value Ref Range    POCT Glucose 197 (H) 74 - 99 mg/dL   POCT GLUCOSE    Collection Time: 02/17/24  4:06 PM   Result Value Ref Range    POCT Glucose 70 (L) 74 - 99 mg/dL   POCT GLUCOSE    Collection Time: 02/17/24  8:48 PM   Result Value Ref Range    POCT Glucose 168 (H) 74 - 99 mg/dL   Vancomycin    Collection Time: 02/18/24  5:20 AM   Result Value Ref Range    Vancomycin 15.8 5.0 - 20.0 ug/mL   Basic Metabolic Panel    Collection Time: 02/18/24  5:20 AM   Result Value Ref Range    Glucose 113 (H) 74 - 99 mg/dL    Sodium 138 136 - 145 mmol/L    Potassium 4.2 3.5 - 5.3 mmol/L    Chloride 108 (H) 98 - 107 mmol/L    Bicarbonate 21 21 - 32 mmol/L    Anion Gap 13 10 - 20 mmol/L    Urea Nitrogen 61 (H) 6 - 23 mg/dL    Creatinine 3.07 (H) 0.50 - 1.05 mg/dL    eGFR 15 (L) >60 mL/min/1.73m*2    Calcium 9.1 8.6 - 10.3 mg/dL   POCT GLUCOSE    Collection Time: 02/18/24  6:27 AM   Result Value Ref Range    POCT Glucose 112 (H) 74 - 99 mg/dL   POCT GLUCOSE    Collection Time: 02/18/24 11:41 AM   Result Value Ref Range    POCT Glucose 185 (H) 74 - 99 mg/dL   CBC and Auto Differential    Collection Time: 02/20/24 12:59 PM   Result Value Ref Range    WBC 8.4 4.4 - 11.3 x10*3/uL    nRBC      RBC 2.76 (L) 4.00 - 5.20 x10*6/uL    Hemoglobin 8.1 (L) 12.0 - 16.0 g/dL    Hematocrit 26.6 (L) 36.0 - 46.0 %    MCV 96 80 - 100 fL    MCH 29.3 26.0 - 34.0 pg    MCHC 30.5 (L) 32.0 - 36.0 g/dL    RDW 25.2 (H) 11.5 - 14.5 %    Platelets 283 150 - 450 x10*3/uL    Neutrophils % 60.9 40.0 - 80.0 %    Immature Granulocytes %, Automated 0.5 0.0 - 0.9 %    Lymphocytes % 24.0 13.0 - 44.0 %    Monocytes % 9.5 2.0 - 10.0 %    Eosinophils % 4.3 0.0 - 6.0 %    Basophils % 0.8 0.0 - 2.0 %    Neutrophils Absolute 5.11 1.60 - 5.50 x10*3/uL     Immature Granulocytes Absolute, Automated 0.04 0.00 - 0.50 x10*3/uL    Lymphocytes Absolute 2.01 0.80 - 3.00 x10*3/uL    Monocytes Absolute 0.80 0.05 - 0.80 x10*3/uL    Eosinophils Absolute 0.36 0.00 - 0.40 x10*3/uL    Basophils Absolute 0.07 0.00 - 0.10 x10*3/uL   Morphology    Collection Time: 02/20/24 12:59 PM   Result Value Ref Range    RBC Morphology No significant RBC morphology present    CBC and Auto Differential    Collection Time: 02/25/24 11:19 PM   Result Value Ref Range    WBC 4.5 4.4 - 11.3 x10*3/uL    nRBC 0.4 (H) 0.0 - 0.0 /100 WBCs    RBC 2.35 (L) 4.00 - 5.20 x10*6/uL    Hemoglobin 7.1 (L) 12.0 - 16.0 g/dL    Hematocrit 22.6 (L) 36.0 - 46.0 %    MCV 96 80 - 100 fL    MCH 30.2 26.0 - 34.0 pg    MCHC 31.4 (L) 32.0 - 36.0 g/dL    RDW 25.9 (H) 11.5 - 14.5 %    Platelets 255 150 - 450 x10*3/uL    Neutrophils % 51.3 40.0 - 80.0 %    Immature Granulocytes %, Automated 0.4 0.0 - 0.9 %    Lymphocytes % 34.2 13.0 - 44.0 %    Monocytes % 8.3 2.0 - 10.0 %    Eosinophils % 5.1 0.0 - 6.0 %    Basophils % 0.7 0.0 - 2.0 %    Neutrophils Absolute 2.30 1.60 - 5.50 x10*3/uL    Immature Granulocytes Absolute, Automated 0.02 0.00 - 0.50 x10*3/uL    Lymphocytes Absolute 1.53 0.80 - 3.00 x10*3/uL    Monocytes Absolute 0.37 0.05 - 0.80 x10*3/uL    Eosinophils Absolute 0.23 0.00 - 0.40 x10*3/uL    Basophils Absolute 0.03 0.00 - 0.10 x10*3/uL   Comprehensive metabolic panel    Collection Time: 02/25/24 11:19 PM   Result Value Ref Range    Glucose 202 (H) 74 - 99 mg/dL    Sodium 134 (L) 136 - 145 mmol/L    Potassium 5.2 3.5 - 5.3 mmol/L    Chloride 108 (H) 98 - 107 mmol/L    Bicarbonate 16 (L) 21 - 32 mmol/L    Anion Gap 15 10 - 20 mmol/L    Urea Nitrogen 112 (HH) 6 - 23 mg/dL    Creatinine 4.67 (H) 0.50 - 1.05 mg/dL    eGFR 9 (L) >60 mL/min/1.73m*2    Calcium 9.1 8.6 - 10.3 mg/dL    Albumin 3.7 3.4 - 5.0 g/dL    Alkaline Phosphatase 49 33 - 136 U/L    Total Protein 6.2 (L) 6.4 - 8.2 g/dL    AST 12 9 - 39 U/L    Bilirubin,  Total 0.3 0.0 - 1.2 mg/dL    ALT 10 7 - 45 U/L     *Note: Due to a large number of results and/or encounters for the requested time period, some results have not been displayed. A complete set of results can be found in Results Review.       ECG  Encounter Date: 04/18/24   ECG 12 lead   Result Value    Ventricular Rate 71    Atrial Rate 70    HI Interval 180    QRS Duration 91    QT Interval 456    QTC Calculation(Bazett) 496    P Axis 46    R Axis 6    T Axis 88    QRS Count 11    Q Onset 249    T Offset 477    QTC Fredericia 482    Narrative    Sinus rhythm  Nonspecific T abnrm, anterolateral leads  Borderline prolonged QT interval    See ED provider note for full interpretation and clinical correlation  Confirmed by Terri Bermudez ([id number]) on 4/18/2024 1:39:30 PM       Echocardiogram  No results found for this or any previous visit from the past 1095 days.      CV Studies:  EKG:  Encounter Date: 04/18/24   ECG 12 lead   Result Value    Ventricular Rate 71    Atrial Rate 70    HI Interval 180    QRS Duration 91    QT Interval 456    QTC Calculation(Bazett) 496    P Axis 46    R Axis 6    T Axis 88    QRS Count 11    Q Onset 249    T Offset 477    QTC Fredericia 482    Narrative    Sinus rhythm  Nonspecific T abnrm, anterolateral leads  Borderline prolonged QT interval    See ED provider note for full interpretation and clinical correlation  Confirmed by Terri Bermudez [id number]) on 4/18/2024 1:39:30 PM     Echocardiogram:   [place] [address]  Phone [phone] Fax [phone]    TRANSTHORACIC ECHOCARDIOGRAM REPORT      Patient Name:     YVONNE Motta Physician:   [id number] Humphrey Callaway DO  Study Date:       12/14/2022          Referring Physician: JASMIN ROACH  MRN/PID:          [id number]            PCP:  Accession/Order#: [id number]           Department Location: [place]  YOB: 1945            Fellow:  Gender:           F                   Nurse:  Admit Date:       12/14/2022          Sonographer:         Urvashi Arora [facility]  Admission Status: Inpatient - Routine Additional Staff:  Height:           152.40 cm           CC Report to:  Weight:           75.30 kg            Study Type:          Echocardiogram  BSA:              1.72 m2  Blood Pressure: 159 /63 mmHg    Diagnosis/ICD: R06.00-Dyspnea, unspecified  Indication:    Dyspnea  Procedure/CPT: Echo Complete w Full Doppler-[id number]    Patient History:  Pertinent History: Dyspnea.    Study Detail: The following Echo studies were performed: 2D, M-Mode, Doppler and  color flow.      PHYSICIAN INTERPRETATION:  Left Ventricle: Left ventricular systolic function is low normal, with an estimated ejection fraction of 50-55%. There are no regional wall motion abnormalities. The left ventricular cavity size is normal. The left ventricular septal wall thickness is mildly increased. Left ventricular diastolic filling was indeterminate.  Left Atrium: The left atrium is moderate to severely dilated.  Right Ventricle: The right ventricle is normal in size. There is low normal right ventricular systolic function.  Right Atrium: The right atrium is mildly dilated.  Aortic Valve: The aortic valve is trileaflet. There is no evidence of aortic valve regurgitation.  Mitral Valve: The mitral valve is normal in structure. There is mild mitral valve regurgitation.  Tricuspid Valve: The tricuspid valve is structurally normal. There is mild to moderate tricuspid regurgitation. The Doppler estimated RVSP is moderately elevated at 52.4 mmHg.  Pulmonic Valve: The pulmonic valve is structurally normal. There is trace pulmonic valve regurgitation.  Pericardium: There is no pericardial effusion noted.  Aorta: The aortic root is normal.      CONCLUSIONS:  1. Left ventricular systolic function is low normal with a 50-55% estimated ejection fraction.  2. There is low normal right  ventricular systolic function.  3. The left atrium is moderate to severely dilated.  4. Mild to moderate tricuspid regurgitation.  5. Moderately elevated right ventricular systolic pressure.    QUANTITATIVE DATA SUMMARY:  2D MEASUREMENTS:  Normal Ranges:  IVSd:          1.29 cm    (0.6-1.1cm)  LVPWd:         1.04 cm    (0.6-1.1cm)  LVIDd:         5.17 cm    (3.9-5.9cm)  LVIDs:         3.93 cm  LV Mass Index: 136.8 g/m2  LV % FS        24.0 %    LA VOLUME:  Normal Ranges:  LA Vol A4C:        81.6 ml    (22+/-6mL/m2)  LA Vol A2C:        81.6 ml  LA Vol BP:         81.6 ml  LA Vol Index A4C:  47.3ml/m2  LA Vol Index A2C:  47.3 ml/m2  LA Vol Index BP:   47.3 ml/m2  LA Area A4C:       24.0 cm2  LA Area A2C:       24.0 cm2  LA Major Axis A4C: 6.0 cm  LA Major Axis A2C: 6.0 cm  LA Volume Index:   47.0 ml/m2  LA Vol A4C:        80.3 ml  LA Vol A2C:        71.0 ml    RA VOLUME BY A/L METHOD:  Normal Ranges:  RA Area A4C: 15.0 cm2    AORTA MEASUREMENTS:  Normal Ranges:  AoV Silva,s: 2.90 cm (1.4-2.6cm)  Asc Ao, d: 3.30 cm (2.1-3.4cm)    LV SYSTOLIC FUNCTION BY 2D PLANIMETRY (MOD):  Normal Ranges:  EF-A4C View: 46.2 % (>=55%)  EF-A2C View: 55.6 %  EF-Biplane:  50.6 %    LV DIASTOLIC FUNCTION:  Normal Ranges:  MV Peak E:    1.05 m/s    (0.7-1.2 m/s)  MV Peak A:    0.91 m/s    (0.42-0.7 m/s)  E/A Ratio:    1.14        (1.0-2.2)  MV e'         0.08 m/s    (>8.0)  MV lateral e' 0.10 m/s  MV medial e'  0.07 m/s  MV A Dur:     108.47 msec  E/e' Ratio:   12.32       (<8.0)  LV IVRT:      74 msec     (<100ms)    MITRAL VALVE:  Normal Ranges:  MV DT: 153 msec (150-240msec)    AORTIC VALVE:  Normal Ranges:  LVOT Max Mario:  1.07 m/s (<=1.1m/s)  LVOT VTI:      25.50 cm  LVOT Diameter: 1.73 cm  (1.8-2.4cm)    RIGHT VENTRICLE:  RV 1   2.8 cm  RV 2   2.3 cm  RV 3   6.4 cm  TAPSE: 26.5 mm  RV s'  0.12 m/s    TRICUSPID VALVE/RVSP:  Normal Ranges:  Peak TR Velocity: 3.51 m/s  RV Syst Pressure: 52.4 mmHg (< 30mmHg)  TV E Vmax:        0.42 m/s   (0.3-0.7m/s)  TV A Vmax:        0.56 m/s  IVC Diam:         1.33 cm  TV e'             0.1 m/s    AORTA:  Asc Ao Diam 3.27 cm      [id number] Humphrey Callaway DO  Electronically signed on 12/14/2022 at 4:35:23 PM         Final     Stress Testing CHRISSY([id number]:1825):   NM CARDIAC STRESS REST (MYOCARDIAL PERFUSION MIBI) 04/08/2022    Narrative  MRN: [id number]  Patient Name: YVONNE BAEZ    STUDY:  CARDIAC STRESS/REST INJECTION; PART 2 STRESS OR REST (NO CHARGE);  CARDIAC STRESS/REST (MYOCARDIAL PERFUSION/MIBI);  4/8/2022 12:00 pm    INDICATION:  CP .    COMPARISON:  None.    ACCESSION NUMBER(S):  [id number]; [id number]; [id number]    ORDERING CLINICIAN:  HUMPHREY CALLAWAY    TECHNIQUE:  DIVISION OF NUCLEAR MEDICINE  PHARMACOLOGIC STRESS MYOCARDIAL PERFUSION SCAN, ONE DAY PROTOCOL    The patient received an intravenous dose of  11.6 mCi of Tc-99m  tetrofosmin and resting emission tomographic (SPECT) images of the  myocardium were acquired. The patient then received an intravenous  infusion of 0.4mg regadenoson (Lexiscan) followed by an additional  dose of  33.7 mCi of Tc-99m tetrofosmin. Stress phase SPECT images of  the myocardium were then acquired. These included ECG-gated images to  assess and quantify ventricular function.    FINDINGS:  There is a small fixed perfusion defect in the mid  anterior/anteroseptal wall with associated wall motion abnormality  consistent with prior infarct. No reversible perfusion defects seen.    Calculated ejection fraction 50% mild hypokinesis of the mid anterior  wall.    Impression  1. There is a small fixed perfusion defect in the mid  anterior/anteroseptal wall with associated wall motion abnormality  consistent with prior infarct. There is a low probability of ischemia.    2. Calculated ejection fraction 50% mild hypokinesis of the mid  anterior wall.    Cardiac Catheterization: No results found for this or any previous visit from the past 1825 days.  No results found for this or any previous  visit from the past 3650 days.     Cardiac Scoring: No results found for this or any previous visit from the past 1825 days.    AAA : No results found for this or any previous visit from the past 1825 days.    OTHER: No results found for this or any previous visit from the past 1825 days.    LAST IMAGING RESULTS  MR abdomen w and wo IV contrast MRCP  Narrative: Interpreted By:  Alon Ga,   STUDY:  MRI of the  right    without contrast dated  4/18/2024.      INDICATION:  Gallstones and abdominal pain      COMPARISON:  Comparison recent CT and ultrasound.          ACCESSION NUMBER(S):  [id number]      ORDERING CLINICIAN:  MAHOGANY LEIVA      TECHNIQUE:  Multiplanar multisequence MRI of the  abdomen is performed.. Pre and  post contrast imaging performed.      FINDINGS:  Hepatic steatosis. Spleen once again appears enlarged. Robust  vascular calcification. No pancreatic duct dilatation. There are  couple of pancreatic cysts with the largest measuring up to 6 mm      Lumbar spine instrumentation does cause associated artifact.      There are couple of renal cysts not concerning by imaging criteria.  Minimal prominence of the collecting system.      Hepatic steatosis. There is gallbladder wall thickening and  enhancement acute cholecystitis can present similarly. Sludge and  stone seen within the lumen of the gallbladder also causing some  artifact. There is no evidence of intrahepatic or extrahepatic  biliary duct dilatation.      Impression: No evidence of biliary duct obstruction. No stone seen within the  common bile duct.      Gallbladder sludge and stones as well as wall enhancement suspicious  for cholecystitis.      Splenomegaly. Hepatic steatosis.      Robust vascular calcification. Artifact seen in the lumbar spine.  Please see recent abdominopelvic CT      MACRO:  None      Signed by: Alon Ga 4/18/2024 9:43 PM  Dictation workstation:   [id number]  ECG 12 lead  Sinus rhythm  Nonspecific T abnrm,  anterolateral leads  Borderline prolonged QT interval    See ED provider note for full interpretation and clinical correlation  Confirmed by Terri Bermudez ([id number]) on 4/18/2024 1:39:30 PM  US gallbladder  Narrative: Interpreted By:  Kalpana Izaguirre,   STUDY:  US GALLBLADDER;  4/18/2024 1:01 pm      INDICATION:  Signs/Symptoms:abdominal painBowel.      COMPARISON:  04/13/2024      ACCESSION NUMBER(S):  [id number]      ORDERING CLINICIAN:  JESÚS GALLOWAY      TECHNIQUE:  Multiple images of the right upper quadrant were obtained.      FINDINGS:  LIVER:  Normal size and echogenicity. No focal lesion demonstrated.      GALLBLADDER:   Normally distended. Contains partially shadowing  gallstones and sludge. Mild anterior wall thickening at 3.8 mm.  Sonographic Luna sign is reportedly positive.      BILIARY TREE: The common duct measures  2 mm.      PANCREAS:   Partially obscured by overlying bowel gas.  Visualized  portions within normal limits.      RIGHT KIDNEY: Increased echogenicity. Normal in size. Mild  hydronephrosis. 1 cm cyst in the midpole.      Impression: Slightly thick-walled gallbladder containing partially shadowing  stones and/or sludge with overlying tenderness. Findings are  consistent with acute cholecystitis. Clinical correlation recommended.      Mild right hydronephrosis. Small right renal cyst. Increased  echogenicity of the right kidney consistent with medical renal  disease.      MACRO:  None.      Signed by: Kalpana Izaguirre 4/18/2024 1:35 PM  Dictation workstation:   [id number]    Problem List Items Addressed This Visit       Chronic diastolic heart failure of unknown etiology (Multi) (Chronic)    Coronary artery disease    Hyperlipidemia (Chronic)    Myelodysplastic syndrome (Multi) (Chronic)    Paroxysmal atrial fibrillation (Multi) - Primary    CKD (chronic kidney disease)    Anemia, unspecified (Chronic)    Essential (primary) hypertension          Humphrey Callaway DO, FACC, FACOI

## 2024-04-19 NOTE — CARE PLAN
The patient's goals for the shift include      Problem: Pain - Adult  Goal: Verbalizes/displays adequate comfort level or baseline comfort level  Outcome: Progressing     Problem: Safety - Adult  Goal: Free from fall injury  Outcome: Progressing     Problem: Discharge Planning  Goal: Discharge to home or other facility with appropriate resources  Outcome: Progressing     Problem: Chronic Conditions and Co-morbidities  Goal: Patient's chronic conditions and co-morbidity symptoms are monitored and maintained or improved  Outcome: Progressing     Problem: Diabetes  Goal: Achieve decreasing blood glucose levels by end of shift  Outcome: Progressing  Goal: Increase stability of blood glucose readings by end of shift  Outcome: Progressing  Goal: Decrease in ketones present in urine by end of shift  Outcome: Progressing  Goal: Maintain electrolyte levels within acceptable range throughout shift  Outcome: Progressing  Goal: Maintain glucose levels >70mg/dl to <250mg/dl throughout shift  Outcome: Progressing  Goal: No changes in neurological exam by end of shift  Outcome: Progressing  Goal: Learn about and adhere to nutrition recommendations by end of shift  Outcome: Progressing  Goal: Vital signs within normal range for age by end of shift  Outcome: Progressing  Goal: Increase self care and/or family involovement by end of shift  Outcome: Progressing  Goal: Receive DSME education by end of shift  Outcome: Progressing     Problem: Fall/Injury  Goal: Not fall by end of shift  Outcome: Progressing  Goal: Be free from injury by end of the shift  Outcome: Progressing  Goal: Verbalize understanding of personal risk factors for fall in the hospital  Outcome: Progressing  Goal: Verbalize understanding of risk factor reduction measures to prevent injury from fall in the home  Outcome: Progressing  Goal: Use assistive devices by end of the shift  Outcome: Progressing  Goal: Pace activities to prevent fatigue by end of the  shift  Outcome: Progressing     Problem: Skin  Goal: Decreased wound size/increased tissue granulation at next dressing change  Outcome: Progressing  Goal: Participates in plan/prevention/treatment measures  Outcome: Progressing  Goal: Promote/optimize nutrition  Outcome: Progressing  Flowsheets (Taken 4/19/2024 1513)  Promote/optimize nutrition:   Consume > 50% meals/supplements   Offer water/supplements/favorite foods  Goal: Promote skin healing  Outcome: Progressing  Flowsheets (Taken 4/19/2024 1513)  Promote skin healing:   Turn/reposition every 2 hours/use positioning/transfer devices   Protective dressings over bony prominences       The clinical goals for the shift include progressing    See assessment and  mar. Remains on room air. Tele as ordered. Wound care as ordered. HD treatment today . Diet advanced.

## 2024-04-19 NOTE — DISCHARGE SUMMARY
Discharge Diagnosis  Acute cholecystitis  ESRD      This discharge took greater than 35 minutes.    Test Results Pending At Discharge  Pending Labs       No current pending labs.            Hospital Course   Tana Hargrove is a 79 y.o. female with PMHx s/f HTN, HLD, CAD, HFpEF, COPD (no baseline O2), NIDDM2, ESRD on HD, chronic anemia requiring Procrit and intermittent PRBC infusions (in setting of MDS and ESRD), OA, anxiety and depression, and GERD, presenting with sudden onset upper abdominal pain which wraps around to the right middle part of her back. Patient reports that she was recently admitted and discharged, had a similar complaint during that last admission and was evaluated at that time with an ultrasound. Today, however, she was visiting a friend in the hospital, when she had sudden onset of upper abdominal pain with a bandlike sensation which she describes felt like it came actually from the middle/right side of her back and wraps around the front.  Was not really associated with any nausea, no vomiting/fever/chills, no shortness of breath, dizziness or lightheadedness, diaphoresis, headache, vision changes. She reports that she has been eating and drinking normally, having normal bowel movements (she denies any associated melena, hematochezia, hemoptysis, hematemesis). By the time she came down from visiting her friend in the hospital to the ER, her symptoms had subsided. At time of my exam she had started her blood transfusion, and had been told by both the emergency department and surgery team the plan was for her to be admitted and get additional imaging, and she was resting comfortably.     ED Course (Summary):   Vitals on presentation: T97.0, /68, HR 61, RR 18, SpO2 95% RA  Labs: CBC with WBC 5.0, Hgb 6.4, platelets 203.  CMP with glucose 185, sodium 136, potassium 3.8, BUN 24, serum creatinine 2.07, alk phos 373, AST 90, ALT 77, total bili 0.7.  Lipase 43.  UA negative for  infection.  Imaging: Ultrasound of the gallbladder was notable for slightly thick-walled gallbladder containing partially shadowing stones and/or sludge with overlying tenderness and a reported positive Luna sign from the ultrasound technician  Interventions: 1 unit PRBCs (currently infusing), surgery consultation with plans for MRCP    04/19: Patient was evaluated this morning, denies having abdominal pain, no nausea vomiting, no fever.  MRCP is negative for CBD dilatation or cholelithiasis/choledocholithiasis.  Reevaluated by surgery and I had a long discussion with patient/son regarding further management.  At this time surgery recommends discharging on oral antibiotics and following up with cardiology for risk stratification for surgery.  Patient will be discharged home on oral Augmentin for 10 days.  Follow-up with cardiology/surgery as an outpatient.    Pertinent Physical Exam At Time of Discharge  Physical Exam  Patient is awake and orient, not in apparent distress  Eyes: PERRLA, no conjunctival congestion  Chest: Bilateral Air entry, no crackles or wheezing  Heart: s1S2 regular, no murmur  Abdomen: Soft, non tender, BS present  Ext:    Home Medications     Medication List      START taking these medications     amoxicillin-pot clavulanate 875-125 mg tablet; Commonly known as:   Augmentin; Take 1 tablet by mouth 2 times a day for 10 days.     CONTINUE taking these medications     allopurinol 100 mg tablet; Commonly known as: Zyloprim; Take 1 tablet   (100 mg) by mouth once daily.   amLODIPine 5 mg tablet; Commonly known as: Norvasc   cefdinir 300 mg capsule; Commonly known as: Omnicef; Take 1 capsule (300   mg) by mouth 2 times a day for 4 days.   cholecalciferol 50 MCG (2000 UT) tablet; Commonly known as: Vitamin D-3   cyanocobalamin 1,000 mcg tablet; Commonly known as: Vitamin B-12   metoprolol tartrate 25 mg tablet; Commonly known as: Lopressor; Take 1   tablet (25 mg) by mouth 2 times a day.    pioglitazone 15 mg tablet; Commonly known as: Actos; Take 1 tablet (15   mg) by mouth once daily.   pravastatin 40 mg tablet; Commonly known as: Pravachol; Take 1 tablet   (40 mg) by mouth once daily at bedtime.   pregabalin 100 mg capsule; Commonly known as: Lyrica; TAKE ONE CAPSULE   BY MOUTH TWICE DAILY @9AM & 5PM   sevelamer carbonate 800 mg tablet; Commonly known as: Renvela; Take 1   tablet (800 mg) by mouth 3 times a day with meals. Swallow tablet whole;   do not crush, break, or chew.   SITagliptin phosphate 25 mg tablet; Commonly known as: Januvia; Take 1   tablet (25 mg) by mouth once daily.       Outpatient Follow-Up  No follow-ups on file.     Kate Edmond MD  4/19/2024  3:26 PM

## 2024-04-19 NOTE — POST-PROCEDURE NOTE
Report to Receiving RN:    Report To: Krystal Ortega  Time Report Called: [id number]  Hand-Off Communication: liters removed,pt alert, last bp and pulse  Complications During Treatment: No  Ultrafiltration Treatment: yes  Medications Administered During Dialysis: No  Blood Products Administered During Dialysis: No  Labs Sent During Dialysis: No  Heparin Drip Rate Changes: No  Dialysis Catheter Dressing: clean dry and intact  Last Dressing Change: 4/17/2024    Electronic Signatures: FRANKI OCDT    Last Updated: 12:39 PM by VINCENT AVALOS

## 2024-04-19 NOTE — PRE-PROCEDURE NOTE
Report from Sending RN:    Report From: RAYA GAGE  Recent Surgery of Procedure: No  Baseline Level of Consciousness (LOC): X3  Oxygen Use: No  Type:   Diabetic: Yes  Last BP Med Given Day of Dialysis: SEE MAR  Last Pain Med Given: SEE MAR  Lab Tests to be Obtained with Dialysis: No  Blood Transfusion to be Given During Dialysis: No  Available IV Access: Yes  Medications to be Administered During Dialysis: No  Continuous IV Infusion Running: No  Restraints on Currently or in the Last 24 Hours: No  Hand-Off Communication: PT VSS,SHE IS A&O X3  Dialysis Catheter Dressing: CLEAN  Last Dressing Change: 4/19/24

## 2024-04-19 NOTE — CONSULTS
Nephrology Consult Note                                                                                                                                         Inpatient consult to Renal Care  Consult performed by: Josue Ramirez PA-C  Consult ordered by: Rosa Calabrese PA-C                                                                                                               HPI  Patient is a 79 y.o. female  with PMHx s/f HTN, HLD, CAD, HFpEF, COPD (no baseline O2), NIDDM2, ESRD on HD, chronic anemia requiring Procrit and intermittent PRBC infusions (in setting of MDS and ESRD), OA, anxiety and depression, and GERD, presenting with sudden onset upper abdominal pain which wraps around to the right middle part of her back. This quickly resolved after presenting to the ER but noted to have worsening anemia and given PRBC's and admitted for further testing.  Nephrology consulted in view of ESRD. She is known to me and receives her normal HD at the [facility] on a MWF schedule and has been compliant with her treatments. She is seen on HD now and tolerating well so far.  She denies any abdominal pain this AM.         Past Medical History:   Diagnosis Date    Abnormal levels of other serum enzymes     Elevated liver enzymes    Acute kidney failure (CMS-HCC)     Anemia     CKD (chronic kidney disease)     COPD (chronic obstructive pulmonary disease) (Multi)     Disease of blood and blood-forming organs, unspecified     Bone marrow disorder    HLD (hyperlipidemia)     MDS (myelodysplastic syndrome) (Multi)     Personal history of other diseases of the musculoskeletal system and connective tissue     History of muscle pain    Personal history of other specified conditions     History of insomnia    Personal history of other specified conditions     History of  edema    Type 2 diabetes mellitus (Multi)       Social History     Socioeconomic History    Marital status:      Spouse name: Not on file    Number of children: Not on file    Years of education: Not on file    Highest education level: Not on file   Occupational History    Not on file   Tobacco Use    Smoking status: Never    Smokeless tobacco: Never   Vaping Use    Vaping status: Never Used   Substance and Sexual Activity    Alcohol use: Not Currently    Drug use: Never    Sexual activity: Not on file   Other Topics Concern    Not on file   Social History Narrative    Not on file     Social Determinants of Health     Financial Resource Strain: Low Risk  (4/18/2024)    Overall Financial Resource Strain (CARDIA)     Difficulty of Paying Living Expenses: Not hard at all   Food Insecurity: No Food Insecurity (4/16/2024)    Hunger Vital Sign     Worried About Running Out of Food in the Last Year: Never true     Ran Out of Food in the Last Year: Never true   Transportation Needs: No Transportation Needs (4/18/2024)    PRAPARE - Transportation     Lack of Transportation (Medical): No     Lack of Transportation (Non-Medical): No   Physical Activity: Not on file   Stress: Not on file   Social Connections: Feeling Somewhat Isolated (2/20/2024)    OASIS : Social Isolation     Frequency of experiencing loneliness or isolation: Sometimes   Intimate Partner Violence: Not on file   Housing Stability: Low Risk  (4/18/2024)    Housing Stability Vital Sign     Unable to Pay for Housing in the Last Year: No     Number of Places Lived in the Last Year: 1     Unstable Housing in the Last Year: No      No family history on file.   No current facility-administered medications on file prior to encounter.     Current Outpatient Medications on File Prior to Encounter   Medication Sig Dispense Refill    allopurinol (Zyloprim) 100 mg tablet Take 1 tablet (100 mg) by mouth once daily. 90 tablet 1    amLODIPine (Norvasc) 5 mg tablet  Take 1 tablet (5 mg) by mouth once daily.      cefdinir (Omnicef) 300 mg capsule Take 1 capsule (300 mg) by mouth 2 times a day for 4 days. 8 capsule 0    cholecalciferol (Vitamin D-3) 50 MCG (2000 UT) tablet Take 1 tablet (2,000 Units) by mouth once daily.      cyanocobalamin (Vitamin B-12) 1,000 mcg tablet Take 1 tablet (1,000 mcg) by mouth once daily.      metoprolol tartrate (Lopressor) 25 mg tablet Take 1 tablet (25 mg) by mouth 2 times a day. 60 tablet 3    pioglitazone (Actos) 15 mg tablet Take 1 tablet (15 mg) by mouth once daily. 30 tablet 3    pravastatin (Pravachol) 40 mg tablet Take 1 tablet (40 mg) by mouth once daily at bedtime. 90 tablet 1    pregabalin (Lyrica) 100 mg capsule TAKE ONE CAPSULE BY MOUTH TWICE DAILY @9AM & 5PM 60 capsule 0    sevelamer carbonate (Renvela) 800 mg tablet Take 1 tablet (800 mg) by mouth 3 times a day with meals. Swallow tablet whole; do not crush, break, or chew. 30 tablet 1    SITagliptin phosphate (Januvia) 25 mg tablet Take 1 tablet (25 mg) by mouth once daily. 30 tablet 3    [DISCONTINUED] epoetin alejandra (Procrit) 10,000 unit/mL injection Pt gets every Monday.      [DISCONTINUED] metoprolol tartrate (Lopressor) 25 mg tablet Take 1 tablet (25 mg) by mouth once daily. 30 tablet 11    [DISCONTINUED] ondansetron ODT (Zofran-ODT) 4 mg disintegrating tablet Take 2 tablets (8 mg) by mouth every 8 hours if needed for nausea or vomiting. (Patient not taking: Reported on 4/16/2024) 20 tablet 0    [DISCONTINUED] oxygen (O2) gas therapy Inhale 3 L/min once every 24 hours. (Patient not taking: Reported on 4/16/2024)      [DISCONTINUED] pioglitazone (Actos) 15 mg tablet TAKE 1 TABLET BY MOUTH ONCE DAILY 90 tablet 0    [DISCONTINUED] pravastatin (Pravachol) 40 mg tablet TAKE 1 TABLET BY MOUTH ONCE DAILY AT BEDTIME 90 tablet 0    [DISCONTINUED] pregabalin (Lyrica) 100 mg capsule TAKE ONE CAPSULE BY MOUTH TWICE DAILY @9AM & 5PM 6 capsule 0    [DISCONTINUED] sennosides-docusate sodium  "(Ana-Colace) 8.6-50 mg tablet Take 2 tablets by mouth 2 times a day as needed for constipation. (Patient not taking: Reported on 4/16/2024) 30 tablet 0    [DISCONTINUED] SITagliptin phosphate (Januvia) 25 mg tablet Take 1 tablet (25 mg) by mouth once daily. Do not start before March 6, 2024. 30 tablet 1      Scheduled medications  allopurinol, 100 mg, oral, Daily  amLODIPine, 5 mg, oral, Daily  ampicillin-sulbactam, 3 g, intravenous, q12h  cefdinir, 300 mg, oral, Daily  cholecalciferol, 2,000 Units, oral, Daily  cyanocobalamin, 1,000 mcg, oral, Daily  melatonin, 3 mg, oral, Nightly  metoprolol tartrate, 25 mg, oral, BID  pantoprazole, 40 mg, oral, Daily before breakfast   Or  pantoprazole, 40 mg, intravenous, Daily before breakfast  polyethylene glycol, 17 g, oral, Daily  pravastatin, 40 mg, oral, Nightly  pregabalin, 100 mg, oral, BID  sevelamer carbonate, 800 mg, oral, TID with meals      Continuous medications     PRN medications  PRN medications: acetaminophen, bisacodyl, guaiFENesin, ondansetron **OR** ondansetron     Review of systems  as per HPI otherwise 10 point review systems negative    BP (!) 185/69 (BP Location: Left arm, Patient Position: Lying) Comment: RN notified  Pulse 63   Temp 36.5 °C (97.7 °F) (Temporal)   Resp 18   Ht 1.549 m (5' 0.98\")   Wt 68 kg (149 lb 14.6 oz)   SpO2 92%   BMI 28.34 kg/m²     Input / Output:  24 HR:   Intake/Output Summary (Last 24 hours) at 4/19/2024 1001  Last data filed at 4/19/2024 0932  Gross per 24 hour   Intake 950 ml   Output 200 ml   Net 750 ml       Physical Exam   Alert and oriented x 3, NAD  EOMI  OP clear  Neck: supple, No JVD  CV: RRR without m/r/g  Lungs: CTA bilaterally  Abd: soft NT/ND +BS  Ext: no lower extremity edema   : no cardona  Neuro: grossly intact  Skin: no rashes    Results from last 7 days   Lab Units 04/19/24  0526 04/18/24  1100 04/13/24  0553   SODIUM mmol/L 141 136 135*   POTASSIUM mmol/L 3.9 3.8 3.6   CHLORIDE mmol/L 103 99 97* "   CO2 mmol/L 29 31 29   BUN mg/dL 27* 24* 17   CREATININE mg/dL 2.18* 2.07* 1.57*   GLUCOSE mg/dL 104* 185* 179*   CALCIUM mg/dL 9.6 9.2 9.3        Results from last 7 days   Lab Units 04/19/24  0526   SODIUM mmol/L 141   POTASSIUM mmol/L 3.9   CHLORIDE mmol/L 103   CO2 mmol/L 29   BUN mg/dL 27*   CREATININE mg/dL 2.18*   CALCIUM mg/dL 9.6   PROTEIN TOTAL g/dL 5.8*   BILIRUBIN TOTAL mg/dL 0.6   ALK PHOS U/L 292*   ALT U/L 58*   AST U/L 38   GLUCOSE mg/dL 104*      Results from last 7 days   Lab Units 04/19/24  0526 04/13/24  0553   MAGNESIUM mg/dL 2.00 1.77      Results from last 7 days   Lab Units 04/19/24  0526 04/18/24  1738 04/18/24  1100 04/13/24  0553   WBC AUTO x10*3/uL 4.0*  --  5.0 10.5   HEMOGLOBIN g/dL 7.0* 8.2* 6.4* 7.7*   HEMATOCRIT % 21.9* 24.8* 20.4* 23.4*   PLATELETS AUTO x10*3/uL 206  --  203 255        US gallbladder   Final Result   Slightly thick-walled gallbladder containing partially shadowing   stones and/or sludge with overlying tenderness. Findings are   consistent with acute cholecystitis. Clinical correlation recommended.        Mild right hydronephrosis. Small right renal cyst. Increased   echogenicity of the right kidney consistent with medical renal   disease.        MACRO:   None.        Signed by: Kalpana Izaguirre 4/18/2024 1:35 PM   Dictation workstation:   [id number]           Assessment:   Patient is 79 y.o. female who is admitted to hospital for abdominal pain and worsening anemia . Nephrology consulted in view of ESRD.    1: ESRD- HD MWF- seen during tx today- tolerating well so far    2: Abdominal pain with possible cholecystitis on MRI     3: Anemia acute on chronic with hx of MDS- Has been getting Micera and iron at her HD unit. S/P PRBC's     4: HTN -BP elevated this AM- monitor for improvement after UF on HD- can increase amlodipine dose if still running high     5: CKD - MBD- On sevelamer - last phosphorus was 4.0 this month - monitor periodically and continue binder as  current     Recommendations:   HD today and continue MWF schedule  Increase amlodipine if BP still running high after some UF on HD today     Please message me through [employer] chat with any questions or concerns.     Josue Ramirez PA-C  4/19/2024  10:01 AM         [facility]    [address], [address]   [place] [address]  Office: [phone]

## 2024-04-19 NOTE — PROGRESS NOTES
Social work consult placed for short term SNF/rehab placement. SW reviewed pt's chart and communicated with TCC. No SW needs foreseen at this time. SW signing off; available upon request.    Caridad Kurtz, MSW, LSW ([id number])   Care Transitions

## 2024-04-19 NOTE — DISCHARGE INSTRUCTIONS
1.  Stay on a low grease, low-fat diet.  You should not eat any foods that are fried or cooked in oil.  Avoid even healthy fats, such as, olive oil, avocados and seeds/nuts.  No peanut butter/mayonnaise/butter.  2.  Augmentin antibiotic has been prescribed for you.  Please take until the antibiotic course is complete, even if you feel better.  3.  A follow-up appointment with Dr. Grimes (general surgery) has been scheduled to further discuss possible gallbladder surgery.  4.  An appointment with cardiology has been scheduled.  You need to have cardiac clearance prior to gallbladder surgery.

## 2024-04-19 NOTE — PROGRESS NOTES
Attempted to meet with patient to discuss discharge needs. Patient is currently off the floor for testing/procedure. TCC following, will attempt to meet with patient when she returns to the floor, as time permits.     1150:    04/19/24 1412   Discharge Planning   Living Arrangements Alone   Support Systems Family members   Assistance Needed none   Type of Residence Private residence   Home or Post Acute Services None   Patient expects to be discharged to: home   Does the patient need discharge transport arranged? No     Patient is still at dialysis, however her son is at bedside. Patient lives at home, uses a rollator but is independent with all of her care needs. She was recently at SNF and was discharged home. No home care active at this time per son. Patient is active, and drives. She attends HD M/W/F at [facility] in [place]. She will return home at discharge with no needs identified at this time.

## 2024-04-20 PROBLEM — M79.606 PAIN OF LOWER EXTREMITY: Status: ACTIVE | Noted: 2023-06-08

## 2024-04-20 PROBLEM — J81.0 ACUTE PULMONARY EDEMA (MULTI): Status: ACTIVE | Noted: 2024-04-20

## 2024-04-20 PROBLEM — R07.9 CHEST PAIN: Status: ACTIVE | Noted: 2024-04-20

## 2024-04-20 PROBLEM — Z20.822 CONTACT WITH AND (SUSPECTED) EXPOSURE TO COVID-19: Status: ACTIVE | Noted: 2023-08-22

## 2024-04-20 PROBLEM — D75.9 BONE MARROW DISORDER: Status: ACTIVE | Noted: 2024-04-20

## 2024-04-20 PROBLEM — S72.009A FRACTURE OF BONE OF HIP (MULTI): Status: ACTIVE | Noted: 2022-10-13

## 2024-04-20 PROBLEM — T78.2XXS ANAPHYLACTIC SHOCK, UNSPECIFIED, SEQUELA: Status: ACTIVE | Noted: 2024-03-25

## 2024-04-20 PROBLEM — D68.9 COAGULATION DEFECT, UNSPECIFIED (MULTI): Status: ACTIVE | Noted: 2024-03-25

## 2024-04-20 PROBLEM — T78.40XS ALLERGY, UNSPECIFIED, SEQUELA: Status: ACTIVE | Noted: 2024-03-25

## 2024-04-20 PROBLEM — M72.2 PLANTAR FASCIITIS: Status: ACTIVE | Noted: 2024-04-20

## 2024-04-20 PROBLEM — Z86.2 HISTORY OF ANEMIA: Status: ACTIVE | Noted: 2024-04-20

## 2024-04-20 PROBLEM — J18.9 PNEUMONIA DUE TO INFECTIOUS ORGANISM: Status: ACTIVE | Noted: 2024-04-20

## 2024-04-20 PROBLEM — M24.159 ARTICULAR CARTILAGE DISORDER OF HIP: Status: ACTIVE | Noted: 2022-04-01

## 2024-04-20 PROBLEM — R52 PAIN, UNSPECIFIED: Status: ACTIVE | Noted: 2024-03-25

## 2024-04-20 PROBLEM — E87.5 HYPERKALEMIA: Status: ACTIVE | Noted: 2022-05-19

## 2024-04-22 ENCOUNTER — PATIENT OUTREACH (OUTPATIENT)
Dept: CARE COORDINATION | Facility: CLINIC | Age: 79
End: 2024-04-22
Payer: MEDICARE

## 2024-04-22 ENCOUNTER — APPOINTMENT (OUTPATIENT)
Dept: HEMATOLOGY/ONCOLOGY | Facility: CLINIC | Age: 79
End: 2024-04-22
Payer: MEDICARE

## 2024-04-22 ENCOUNTER — APPOINTMENT (OUTPATIENT)
Dept: CARDIOLOGY | Facility: HOSPITAL | Age: 79
DRG: 981 | End: 2024-04-22
Payer: MEDICARE

## 2024-04-22 ENCOUNTER — HOSPITAL ENCOUNTER (INPATIENT)
Facility: HOSPITAL | Age: 79
LOS: 8 days | Discharge: HOME | DRG: 981 | End: 2024-04-30
Attending: STUDENT IN AN ORGANIZED HEALTH CARE EDUCATION/TRAINING PROGRAM | Admitting: STUDENT IN AN ORGANIZED HEALTH CARE EDUCATION/TRAINING PROGRAM
Payer: MEDICARE

## 2024-04-22 ENCOUNTER — APPOINTMENT (OUTPATIENT)
Dept: RADIOLOGY | Facility: HOSPITAL | Age: 79
DRG: 981 | End: 2024-04-22
Payer: MEDICARE

## 2024-04-22 DIAGNOSIS — K81.1 CHRONIC CHOLECYSTITIS: ICD-10-CM

## 2024-04-22 DIAGNOSIS — R29.6 REPEATED FALLS: ICD-10-CM

## 2024-04-22 DIAGNOSIS — M17.11 PRIMARY OSTEOARTHRITIS OF RIGHT KNEE: ICD-10-CM

## 2024-04-22 DIAGNOSIS — Z99.2 ESRD (END STAGE RENAL DISEASE) ON DIALYSIS (MULTI): Chronic | ICD-10-CM

## 2024-04-22 DIAGNOSIS — M24.159: ICD-10-CM

## 2024-04-22 DIAGNOSIS — N17.9 AKI (ACUTE KIDNEY INJURY) (CMS-HCC): ICD-10-CM

## 2024-04-22 DIAGNOSIS — S72.009A FRACTURE OF BONE OF HIP (MULTI): ICD-10-CM

## 2024-04-22 DIAGNOSIS — R29.6 FREQUENT FALLS: ICD-10-CM

## 2024-04-22 DIAGNOSIS — S09.90XA CLOSED HEAD INJURY, INITIAL ENCOUNTER: ICD-10-CM

## 2024-04-22 DIAGNOSIS — S01.01XA LACERATION OF SCALP, INITIAL ENCOUNTER: ICD-10-CM

## 2024-04-22 DIAGNOSIS — N18.6 ESRD (END STAGE RENAL DISEASE) ON DIALYSIS (MULTI): Chronic | ICD-10-CM
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
## 2024-04-22 LAB
ALBUMIN SERPL BCP-MCNC: 3.9 G/DL (ref 3.4–5)
ALP SERPL-CCNC: 152 U/L (ref 33–136)
ALT SERPL W P-5'-P-CCNC: 26 U/L (ref 7–45)
ANION GAP SERPL CALC-SCNC: 16 MMOL/L (ref 10–20)
AST SERPL W P-5'-P-CCNC: 20 U/L (ref 9–39)
BASOPHILS # BLD AUTO: 0.04 X10*3/UL (ref 0–0.1)
BASOPHILS NFR BLD AUTO: 0.3 %
BILIRUB SERPL-MCNC: 0.6 MG/DL (ref 0–1.2)
BNP SERPL-MCNC: 223 PG/ML (ref 0–99)
BUN SERPL-MCNC: 47 MG/DL (ref 6–23)
CALCIUM SERPL-MCNC: 10 MG/DL (ref 8.6–10.3)
CARDIAC TROPONIN I PNL SERPL HS: 8 NG/L (ref 0–13)
CHLORIDE SERPL-SCNC: 103 MMOL/L (ref 98–107)
CK SERPL-CCNC: 38 U/L (ref 0–215)
CO2 SERPL-SCNC: 23 MMOL/L (ref 21–32)
CREAT SERPL-MCNC: 3.03 MG/DL (ref 0.5–1.05)
DACRYOCYTES BLD QL SMEAR: NORMAL
EGFRCR SERPLBLD CKD-EPI 2021: 15 ML/MIN/1.73M*2
EOSINOPHIL # BLD AUTO: 0.02 X10*3/UL (ref 0–0.4)
EOSINOPHIL NFR BLD AUTO: 0.1 %
ERYTHROCYTE [DISTWIDTH] IN BLOOD BY AUTOMATED COUNT: 21.8 % (ref 11.5–14.5)
GLUCOSE SERPL-MCNC: 161 MG/DL (ref 74–99)
HCT VFR BLD AUTO: 21.8 % (ref 36–46)
HGB BLD-MCNC: 7 G/DL (ref 12–16)
HYPOCHROMIA BLD QL SMEAR: NORMAL
IMM GRANULOCYTES # BLD AUTO: 0.1 X10*3/UL (ref 0–0.5)
IMM GRANULOCYTES NFR BLD AUTO: 0.7 % (ref 0–0.9)
LIPASE SERPL-CCNC: 28 U/L (ref 9–82)
LYMPHOCYTES # BLD AUTO: 1.09 X10*3/UL (ref 0.8–3)
LYMPHOCYTES NFR BLD AUTO: 8 %
MAGNESIUM SERPL-MCNC: 1.81 MG/DL (ref 1.6–2.4)
MCH RBC QN AUTO: 32.1 PG (ref 26–34)
MCHC RBC AUTO-ENTMCNC: 32.1 G/DL (ref 32–36)
MCV RBC AUTO: 100 FL (ref 80–100)
MONOCYTES # BLD AUTO: 0.99 X10*3/UL (ref 0.05–0.8)
MONOCYTES NFR BLD AUTO: 7.2 %
NEUTROPHILS # BLD AUTO: 11.44 X10*3/UL (ref 1.6–5.5)
NEUTROPHILS NFR BLD AUTO: 83.7 %
NRBC BLD-RTO: 0.3 /100 WBCS (ref 0–0)
OVALOCYTES BLD QL SMEAR: NORMAL
PLATELET # BLD AUTO: 322 X10*3/UL (ref 150–450)
POLYCHROMASIA BLD QL SMEAR: NORMAL
POTASSIUM SERPL-SCNC: 5.1 MMOL/L (ref 3.5–5.3)
PROT SERPL-MCNC: 6.5 G/DL (ref 6.4–8.2)
RBC # BLD AUTO: 2.18 X10*6/UL (ref 4–5.2)
RBC MORPH BLD: NORMAL
SODIUM SERPL-SCNC: 137 MMOL/L (ref 136–145)
STOMATOCYTES BLD QL SMEAR: NORMAL
TARGETS BLD QL SMEAR: NORMAL
WBC # BLD AUTO: 13.7 X10*3/UL (ref 4.4–11.3)

## 2024-04-22 PROCEDURE — 80053 COMPREHEN METABOLIC PANEL: CPT | Performed by: STUDENT IN AN ORGANIZED HEALTH CARE EDUCATION/TRAINING PROGRAM

## 2024-04-22 PROCEDURE — 72125 CT NECK SPINE W/O DYE: CPT | Performed by: RADIOLOGY

## 2024-04-22 PROCEDURE — 36415 COLL VENOUS BLD VENIPUNCTURE: CPT | Performed by: STUDENT IN AN ORGANIZED HEALTH CARE EDUCATION/TRAINING PROGRAM

## 2024-04-22 PROCEDURE — 72125 CT NECK SPINE W/O DYE: CPT

## 2024-04-22 PROCEDURE — 99223 1ST HOSP IP/OBS HIGH 75: CPT | Performed by: STUDENT IN AN ORGANIZED HEALTH CARE EDUCATION/TRAINING PROGRAM

## 2024-04-22 PROCEDURE — 83690 ASSAY OF LIPASE: CPT | Performed by: STUDENT IN AN ORGANIZED HEALTH CARE EDUCATION/TRAINING PROGRAM

## 2024-04-22 PROCEDURE — 70450 CT HEAD/BRAIN W/O DYE: CPT | Performed by: RADIOLOGY

## 2024-04-22 PROCEDURE — 0HQ0XZZ REPAIR SCALP SKIN, EXTERNAL APPROACH: ICD-10-PCS | Performed by: STUDENT IN AN ORGANIZED HEALTH CARE EDUCATION/TRAINING PROGRAM

## 2024-04-22 PROCEDURE — 83880 ASSAY OF NATRIURETIC PEPTIDE: CPT | Performed by: STUDENT IN AN ORGANIZED HEALTH CARE EDUCATION/TRAINING PROGRAM

## 2024-04-22 PROCEDURE — 84484 ASSAY OF TROPONIN QUANT: CPT | Performed by: STUDENT IN AN ORGANIZED HEALTH CARE EDUCATION/TRAINING PROGRAM

## 2024-04-22 PROCEDURE — 70450 CT HEAD/BRAIN W/O DYE: CPT

## 2024-04-22 PROCEDURE — 93005 ELECTROCARDIOGRAM TRACING: CPT

## 2024-04-22 PROCEDURE — 99285 EMERGENCY DEPT VISIT HI MDM: CPT | Mod: 25

## 2024-04-22 PROCEDURE — 82550 ASSAY OF CK (CPK): CPT | Performed by: STUDENT IN AN ORGANIZED HEALTH CARE EDUCATION/TRAINING PROGRAM

## 2024-04-22 PROCEDURE — 1200000002 HC GENERAL ROOM WITH TELEMETRY DAILY

## 2024-04-22 PROCEDURE — 83735 ASSAY OF MAGNESIUM: CPT | Performed by: STUDENT IN AN ORGANIZED HEALTH CARE EDUCATION/TRAINING PROGRAM

## 2024-04-22 PROCEDURE — 85025 COMPLETE CBC W/AUTO DIFF WBC: CPT | Performed by: STUDENT IN AN ORGANIZED HEALTH CARE EDUCATION/TRAINING PROGRAM

## 2024-04-22 RX ORDER — LANOLIN ALCOHOL/MO/W.PET/CERES
1000 CREAM (GRAM) TOPICAL DAILY
Status: DISCONTINUED | OUTPATIENT
Start: 2024-04-23 | End: 2024-04-30 | Stop reason: HOSPADM

## 2024-04-22 RX ORDER — DEXTROSE 50 % IN WATER (D50W) INTRAVENOUS SYRINGE
25
Status: DISCONTINUED | OUTPATIENT
Start: 2024-04-22 | End: 2024-04-30 | Stop reason: HOSPADM

## 2024-04-22 RX ORDER — NAPROXEN SODIUM 220 MG/1
81 TABLET, FILM COATED ORAL DAILY
Status: DISCONTINUED | OUTPATIENT
Start: 2024-04-23 | End: 2024-04-30 | Stop reason: HOSPADM

## 2024-04-22 RX ORDER — INSULIN LISPRO 100 [IU]/ML
0-5 INJECTION, SOLUTION INTRAVENOUS; SUBCUTANEOUS
Status: DISCONTINUED | OUTPATIENT
Start: 2024-04-23 | End: 2024-04-30 | Stop reason: HOSPADM

## 2024-04-22 RX ORDER — ALLOPURINOL 100 MG/1
100 TABLET ORAL DAILY
Status: DISCONTINUED | OUTPATIENT
Start: 2024-04-23 | End: 2024-04-30 | Stop reason: HOSPADM

## 2024-04-22 RX ORDER — AMOXICILLIN AND CLAVULANATE POTASSIUM 500; 125 MG/1; MG/1
1 TABLET, FILM COATED ORAL 2 TIMES DAILY
Status: DISCONTINUED | OUTPATIENT
Start: 2024-04-22 | End: 2024-04-25

## 2024-04-22 RX ORDER — ONDANSETRON HYDROCHLORIDE 2 MG/ML
4 INJECTION, SOLUTION INTRAVENOUS EVERY 8 HOURS PRN
Status: DISCONTINUED | OUTPATIENT
Start: 2024-04-22 | End: 2024-04-30 | Stop reason: HOSPADM

## 2024-04-22 RX ORDER — ONDANSETRON 4 MG/1
4 TABLET, FILM COATED ORAL EVERY 8 HOURS PRN
Status: DISCONTINUED | OUTPATIENT
Start: 2024-04-22 | End: 2024-04-30 | Stop reason: HOSPADM

## 2024-04-22 RX ORDER — PRAVASTATIN SODIUM 40 MG/1
40 TABLET ORAL NIGHTLY
Status: DISCONTINUED | OUTPATIENT
Start: 2024-04-22 | End: 2024-04-30 | Stop reason: HOSPADM

## 2024-04-22 RX ORDER — POLYETHYLENE GLYCOL 3350 17 G/17G
17 POWDER, FOR SOLUTION ORAL DAILY PRN
Status: DISCONTINUED | OUTPATIENT
Start: 2024-04-22 | End: 2024-04-30 | Stop reason: HOSPADM

## 2024-04-22 RX ORDER — BACITRACIN ZINC 500 UNIT/G
OINTMENT IN PACKET (EA) TOPICAL
Status: DISPENSED
Start: 2024-04-22 | End: 2024-04-23

## 2024-04-22 RX ORDER — IPRATROPIUM BROMIDE AND ALBUTEROL SULFATE 2.5; .5 MG/3ML; MG/3ML
3 SOLUTION RESPIRATORY (INHALATION) EVERY 6 HOURS PRN
Status: DISCONTINUED | OUTPATIENT
Start: 2024-04-22 | End: 2024-04-30 | Stop reason: HOSPADM

## 2024-04-22 RX ORDER — TALC
3 POWDER (GRAM) TOPICAL NIGHTLY PRN
Status: DISCONTINUED | OUTPATIENT
Start: 2024-04-22 | End: 2024-04-30 | Stop reason: HOSPADM

## 2024-04-22 RX ORDER — DEXTROSE 50 % IN WATER (D50W) INTRAVENOUS SYRINGE
12.5
Status: DISCONTINUED | OUTPATIENT
Start: 2024-04-22 | End: 2024-04-30 | Stop reason: HOSPADM

## 2024-04-22 RX ORDER — HEPARIN SODIUM 5000 [USP'U]/ML
5000 INJECTION, SOLUTION INTRAVENOUS; SUBCUTANEOUS EVERY 12 HOURS
Status: DISCONTINUED | OUTPATIENT
Start: 2024-04-22 | End: 2024-04-30 | Stop reason: HOSPADM

## 2024-04-22 RX ORDER — METOPROLOL TARTRATE 25 MG/1
25 TABLET, FILM COATED ORAL 2 TIMES DAILY
Status: DISCONTINUED | OUTPATIENT
Start: 2024-04-22 | End: 2024-04-30 | Stop reason: HOSPADM

## 2024-04-22 RX ORDER — SEVELAMER CARBONATE 800 MG/1
800 TABLET, FILM COATED ORAL
Status: DISCONTINUED | OUTPATIENT
Start: 2024-04-23 | End: 2024-04-30 | Stop reason: HOSPADM

## 2024-04-22 RX ORDER — AMOXICILLIN AND CLAVULANATE POTASSIUM 875; 125 MG/1; MG/1
1 TABLET, FILM COATED ORAL 2 TIMES DAILY
Status: DISCONTINUED | OUTPATIENT
Start: 2024-04-22 | End: 2024-04-22 | Stop reason: DRUGHIGH

## 2024-04-22 RX ORDER — PREGABALIN 75 MG/1
75 CAPSULE ORAL 2 TIMES DAILY
Status: DISCONTINUED | OUTPATIENT
Start: 2024-04-22 | End: 2024-04-30 | Stop reason: HOSPADM

## 2024-04-22 RX ORDER — CHOLECALCIFEROL (VITAMIN D3) 25 MCG
2000 TABLET ORAL DAILY
Status: DISCONTINUED | OUTPATIENT
Start: 2024-04-23 | End: 2024-04-30 | Stop reason: HOSPADM

## 2024-04-22 RX ORDER — AMLODIPINE BESYLATE 5 MG/1
5 TABLET ORAL DAILY
Status: DISCONTINUED | OUTPATIENT
Start: 2024-04-23 | End: 2024-04-30 | Stop reason: HOSPADM

## 2024-04-22 ASSESSMENT — LIFESTYLE VARIABLES
EVER HAD A DRINK FIRST THING IN THE MORNING TO STEADY YOUR NERVES TO GET RID OF A HANGOVER: NO
HAVE PEOPLE ANNOYED YOU BY CRITICIZING YOUR DRINKING: NO
HAVE YOU EVER FELT YOU SHOULD CUT DOWN ON YOUR DRINKING: NO
TOTAL SCORE: 0
EVER FELT BAD OR GUILTY ABOUT YOUR DRINKING: NO

## 2024-04-22 ASSESSMENT — PAIN SCALES - GENERAL: PAINLEVEL_OUTOF10: 0 - NO PAIN

## 2024-04-22 ASSESSMENT — COLUMBIA-SUICIDE SEVERITY RATING SCALE - C-SSRS
2. HAVE YOU ACTUALLY HAD ANY THOUGHTS OF KILLING YOURSELF?: NO
1. IN THE PAST MONTH, HAVE YOU WISHED YOU WERE DEAD OR WISHED YOU COULD GO TO SLEEP AND NOT WAKE UP?: NO
6. HAVE YOU EVER DONE ANYTHING, STARTED TO DO ANYTHING, OR PREPARED TO DO ANYTHING TO END YOUR LIFE?: NO

## 2024-04-22 ASSESSMENT — PAIN - FUNCTIONAL ASSESSMENT: PAIN_FUNCTIONAL_ASSESSMENT: 0-10

## 2024-04-22 NOTE — ED PROVIDER NOTES
HPI   Chief Complaint   Patient presents with    Fall     Fell at 8 am remembers knees buckling and falling back hitting head on counter could not get up until 1400. Lac on head.        This is a 79-year-old female who presents to the emergency department with for a fall.  Patient states that she was in her kitchen when her knees buckled she fell down hitting the back of her head did not lose consciousness she is only on aspirin.  She is after 4 hours on the ground she is able to get herself up.  She reports no pain anywhere except for on her sacrum which is where she fell but has no weakness numbness or tingling in any of her extremities.  She denied having chest pain, shortness of breath or any symptoms prior to her fall.                          Tasneem Coma Scale Score: 15                  Patient History   Past Medical History:   Diagnosis Date    Abnormal levels of other serum enzymes     Elevated liver enzymes    Acute kidney failure (CMS-HCC)     Anemia     CKD (chronic kidney disease)     COPD (chronic obstructive pulmonary disease) (Multi)     Disease of blood and blood-forming organs, unspecified     Bone marrow disorder    HLD (hyperlipidemia)     MDS (myelodysplastic syndrome) (Multi)     Personal history of other diseases of the musculoskeletal system and connective tissue     History of muscle pain    Personal history of other specified conditions     History of insomnia    Personal history of other specified conditions     History of edema    Type 2 diabetes mellitus (Multi)      Past Surgical History:   Procedure Laterality Date    APPENDECTOMY      BREAST BIOPSY Left     left excisional    BREAST LUMPECTOMY      COLONOSCOPY      HYSTERECTOMY      MR HEAD ANGIO WO IV CONTRAST  11/20/2020    MR HEAD ANGIO WO IV CONTRAST 11/20/2020 [facility]    MR NECK ANGIO WO IV CONTRAST  11/20/2020    MR NECK ANGIO WO IV CONTRAST 11/20/2020 [facility]    OOPHORECTOMY      OTHER SURGICAL HISTORY   12/13/2019    Oophorectomy bilateral    OTHER SURGICAL HISTORY  12/13/2019    Tubal ligation    OTHER SURGICAL HISTORY Bilateral 12/13/2019    Knee replacement    OTHER SURGICAL HISTORY Left 12/13/2019    Shoulder surgery    OTHER SURGICAL HISTORY  12/13/2019    Hysterectomy    OTHER SURGICAL HISTORY  12/13/2019    Lumpectomy    OTHER SURGICAL HISTORY Right 12/13/2019    Foot surgery    OTHER SURGICAL HISTORY  04/16/2021    Back surgery     No family history on file.  Social History     Tobacco Use    Smoking status: Never    Smokeless tobacco: Never   Vaping Use    Vaping status: Never Used   Substance Use Topics    Alcohol use: Not Currently    Drug use: Never       Physical Exam   ED Triage Vitals [04/22/24 1443]   Temperature Heart Rate Respirations BP   35.6 °C (96.1 °F) 78 15 119/51      Pulse Ox Temp Source Heart Rate Source Patient Position   (!) 93 % Temporal Monitor --      BP Location FiO2 (%)     -- --       Physical Exam  Constitutional:       General: She is not in acute distress.  HENT:      Head: Normocephalic.      Comments: 2cm abrasion     Right Ear: Tympanic membrane normal.      Left Ear: Tympanic membrane normal.      Mouth/Throat:      Mouth: Mucous membranes are moist.   Eyes:      Extraocular Movements: Extraocular movements intact.      Conjunctiva/sclera: Conjunctivae normal.      Pupils: Pupils are equal, round, and reactive to light.   Cardiovascular:      Rate and Rhythm: Normal rate and regular rhythm.      Heart sounds: No murmur heard.  Pulmonary:      Effort: Pulmonary effort is normal. No respiratory distress.      Breath sounds: Normal breath sounds. No stridor. No wheezing or rales.   Abdominal:      General: Bowel sounds are normal. There is no distension.      Tenderness: There is no abdominal tenderness. There is no guarding or rebound.   Musculoskeletal:         General: No swelling, tenderness or deformity. Normal range of motion.      Cervical back: Normal range of motion.    Skin:     General: Skin is warm and dry.      Coloration: Skin is not jaundiced.      Findings: No bruising or lesion.   Neurological:      General: No focal deficit present.      Mental Status: She is alert and oriented to person, place, and time. Mental status is at baseline.      Cranial Nerves: No cranial nerve deficit.      Motor: No weakness.   Psychiatric:         Mood and Affect: Mood normal.       Labs Reviewed - No data to display  No orders to display       ED Course & MDM   ED Course as of 04/22/24 2037 Mon Apr 22, 2024   1630 EKG on my read shows sinus rhythm at a rate of 70 bpm, no ST changes or elevations nonischemic EKG, otherwise normal intervals. [CF]   1942 IMPRESSION:      High posterior left parietal scalp laceration with small scalp  hematoma. No depressed skull fracture. No acute intracranial bleed or  focal mass effect.      Moderate volume loss in the brain.      No CT evidence of cervical spine fracture in this exam.      Cervical spine DJD as described.      Ground-glass infiltrative nodular density in the posterior right lung  apex is stable to slightly larger compared to CT scan chest from  04/13/2024. This was not present back on 04/06/2022. Small pneumonia  versus developing infiltrative tumor. Consider further evaluation  with PET-CT scan.       [CF]      ED Course User Index  [CF] Sariah Benson MD       Medical Decision Making  This is an 79-year-old female who presents emergency department for a fall that was mechanical.  On examination she no longer is bleeding from her posterior scalp she does have what appears to be an abrasion that has been controlled.  I do not believe that he needs staples or any repair.  EKG appears nonischemic.  Patient does have worsening creatinine function after talking with her they have been monitoring over the past couple days but has doubled.  At this time believe patient requires admission for her creatinine.  Though she has no signs of  rhabdomyolysis or any signs of trauma otherwise.  No other issues or concerns.        Procedure  Procedures     Sariah Benson MD  04/22/24 2039

## 2024-04-22 NOTE — PROGRESS NOTES
Discharge Facility:[facility]  Discharge Diagnosis:ABD Pain Acute Cholecystitis  Admission Date:04/18/24  Discharge Date: 04/19/24    PCP Appointment Date:none made sent to pcp office  Specialist Appointment Date:   Hospital Encounter and Summary: Linked   See discharge assessment below for further details  2 attempts

## 2024-04-23 ENCOUNTER — APPOINTMENT (OUTPATIENT)
Dept: DIALYSIS | Facility: HOSPITAL | Age: 79
End: 2024-04-23

## 2024-04-23 ENCOUNTER — APPOINTMENT (OUTPATIENT)
Dept: RADIOLOGY | Facility: HOSPITAL | Age: 79
DRG: 981 | End: 2024-04-23
Payer: MEDICARE

## 2024-04-23 ENCOUNTER — APPOINTMENT (OUTPATIENT)
Dept: CARDIOLOGY | Facility: CLINIC | Age: 79
End: 2024-04-23
Payer: MEDICARE

## 2024-04-23 LAB
ABO GROUP (TYPE) IN BLOOD: NORMAL
ALBUMIN SERPL BCP-MCNC: 3.3 G/DL (ref 3.4–5)
ALP SERPL-CCNC: 110 U/L (ref 33–136)
ALT SERPL W P-5'-P-CCNC: 19 U/L (ref 7–45)
ANION GAP SERPL CALC-SCNC: 11 MMOL/L (ref 10–20)
ANTIBODY SCREEN: NORMAL
AST SERPL W P-5'-P-CCNC: 14 U/L (ref 9–39)
BILIRUB SERPL-MCNC: 0.4 MG/DL (ref 0–1.2)
BLOOD EXPIRATION DATE: NORMAL
BLOOD EXPIRATION DATE: NORMAL
BUN SERPL-MCNC: 53 MG/DL (ref 6–23)
CALCIUM SERPL-MCNC: 9 MG/DL (ref 8.6–10.3)
CHLORIDE SERPL-SCNC: 105 MMOL/L (ref 98–107)
CO2 SERPL-SCNC: 27 MMOL/L (ref 21–32)
CREAT SERPL-MCNC: 3.39 MG/DL (ref 0.5–1.05)
DISPENSE STATUS: NORMAL
DISPENSE STATUS: NORMAL
EGFRCR SERPLBLD CKD-EPI 2021: 13 ML/MIN/1.73M*2
ERYTHROCYTE [DISTWIDTH] IN BLOOD BY AUTOMATED COUNT: 22.1 % (ref 11.5–14.5)
GLUCOSE BLD MANUAL STRIP-MCNC: 103 MG/DL (ref 74–99)
GLUCOSE BLD MANUAL STRIP-MCNC: 125 MG/DL (ref 74–99)
GLUCOSE BLD MANUAL STRIP-MCNC: 128 MG/DL (ref 74–99)
GLUCOSE BLD MANUAL STRIP-MCNC: 135 MG/DL (ref 74–99)
GLUCOSE SERPL-MCNC: 129 MG/DL (ref 74–99)
HBV SURFACE AB SER-ACNC: 20 MIU/ML
HCT VFR BLD AUTO: 15.3 % (ref 36–46)
HCT VFR BLD AUTO: 22.7 % (ref 36–46)
HGB BLD-MCNC: 4.8 G/DL (ref 12–16)
HGB BLD-MCNC: 7.8 G/DL (ref 12–16)
MCH RBC QN AUTO: 31.2 PG (ref 26–34)
MCHC RBC AUTO-ENTMCNC: 31.4 G/DL (ref 32–36)
MCV RBC AUTO: 99 FL (ref 80–100)
NRBC BLD-RTO: 0.7 /100 WBCS (ref 0–0)
PLATELET # BLD AUTO: 235 X10*3/UL (ref 150–450)
POTASSIUM SERPL-SCNC: 4.5 MMOL/L (ref 3.5–5.3)
PRODUCT BLOOD TYPE: 600
PRODUCT BLOOD TYPE: 600
PRODUCT CODE: NORMAL
PRODUCT CODE: NORMAL
PROT SERPL-MCNC: 5.2 G/DL (ref 6.4–8.2)
RBC # BLD AUTO: 1.54 X10*6/UL (ref 4–5.2)
RH FACTOR (ANTIGEN D): NORMAL
SODIUM SERPL-SCNC: 138 MMOL/L (ref 136–145)
UNIT ABO: NORMAL
UNIT ABO: NORMAL
UNIT NUMBER: NORMAL
UNIT NUMBER: NORMAL
UNIT RH: NORMAL
UNIT RH: NORMAL
UNIT VOLUME: 350
UNIT VOLUME: 350
WBC # BLD AUTO: 5.9 X10*3/UL (ref 4.4–11.3)
XM INTEP: NORMAL
XM INTEP: NORMAL

## 2024-04-23 PROCEDURE — 99233 SBSQ HOSP IP/OBS HIGH 50: CPT | Performed by: INTERNAL MEDICINE

## 2024-04-23 PROCEDURE — 8010000001 HC DIALYSIS - HEMODIALYSIS PER DAY

## 2024-04-23 PROCEDURE — 2500000006 HC RX 250 W HCPCS SELF ADMINISTERED DRUGS (ALT 637 FOR ALL PAYERS): Mod: MUE | Performed by: STUDENT IN AN ORGANIZED HEALTH CARE EDUCATION/TRAINING PROGRAM

## 2024-04-23 PROCEDURE — 72170 X-RAY EXAM OF PELVIS: CPT

## 2024-04-23 PROCEDURE — 1200000002 HC GENERAL ROOM WITH TELEMETRY DAILY

## 2024-04-23 PROCEDURE — 84075 ASSAY ALKALINE PHOSPHATASE: CPT | Performed by: STUDENT IN AN ORGANIZED HEALTH CARE EDUCATION/TRAINING PROGRAM

## 2024-04-23 PROCEDURE — 2500000004 HC RX 250 GENERAL PHARMACY W/ HCPCS (ALT 636 FOR OP/ED): Performed by: STUDENT IN AN ORGANIZED HEALTH CARE EDUCATION/TRAINING PROGRAM

## 2024-04-23 PROCEDURE — 2500000001 HC RX 250 WO HCPCS SELF ADMINISTERED DRUGS (ALT 637 FOR MEDICARE OP): Performed by: STUDENT IN AN ORGANIZED HEALTH CARE EDUCATION/TRAINING PROGRAM

## 2024-04-23 PROCEDURE — 82947 ASSAY GLUCOSE BLOOD QUANT: CPT | Mod: 91

## 2024-04-23 PROCEDURE — 71045 X-RAY EXAM CHEST 1 VIEW: CPT

## 2024-04-23 PROCEDURE — 36415 COLL VENOUS BLD VENIPUNCTURE: CPT | Performed by: STUDENT IN AN ORGANIZED HEALTH CARE EDUCATION/TRAINING PROGRAM

## 2024-04-23 PROCEDURE — 85027 COMPLETE CBC AUTOMATED: CPT | Performed by: STUDENT IN AN ORGANIZED HEALTH CARE EDUCATION/TRAINING PROGRAM

## 2024-04-23 PROCEDURE — P9040 RBC LEUKOREDUCED IRRADIATED: HCPCS

## 2024-04-23 PROCEDURE — 86706 HEP B SURFACE ANTIBODY: CPT | Mod: PORLAB | Performed by: INTERNAL MEDICINE

## 2024-04-23 PROCEDURE — 86922 COMPATIBILITY TEST ANTIGLOB: CPT

## 2024-04-23 PROCEDURE — 5A1D70Z PERFORMANCE OF URINARY FILTRATION, INTERMITTENT, LESS THAN 6 HOURS PER DAY: ICD-10-PCS | Performed by: INTERNAL MEDICINE

## 2024-04-23 PROCEDURE — 71045 X-RAY EXAM CHEST 1 VIEW: CPT | Performed by: RADIOLOGY

## 2024-04-23 PROCEDURE — 86900 BLOOD TYPING SEROLOGIC ABO: CPT | Mod: 91 | Performed by: STUDENT IN AN ORGANIZED HEALTH CARE EDUCATION/TRAINING PROGRAM

## 2024-04-23 PROCEDURE — 72170 X-RAY EXAM OF PELVIS: CPT | Performed by: RADIOLOGY

## 2024-04-23 PROCEDURE — 36430 TRANSFUSION BLD/BLD COMPNT: CPT

## 2024-04-23 PROCEDURE — 85018 HEMOGLOBIN: CPT | Performed by: INTERNAL MEDICINE

## 2024-04-23 PROCEDURE — 2500000005 HC RX 250 GENERAL PHARMACY W/O HCPCS: Performed by: STUDENT IN AN ORGANIZED HEALTH CARE EDUCATION/TRAINING PROGRAM

## 2024-04-23 RX ORDER — FUROSEMIDE 10 MG/ML
80 INJECTION INTRAMUSCULAR; INTRAVENOUS ONCE
Status: COMPLETED | OUTPATIENT
Start: 2024-04-23 | End: 2024-04-23

## 2024-04-23 RX ORDER — ACETAMINOPHEN 325 MG/1
650 TABLET ORAL EVERY 6 HOURS PRN
Status: DISCONTINUED | OUTPATIENT
Start: 2024-04-23 | End: 2024-04-26

## 2024-04-23 RX ADMIN — PRAVASTATIN SODIUM 40 MG: 40 TABLET ORAL at 00:24

## 2024-04-23 RX ADMIN — METOPROLOL TARTRATE 25 MG: 25 TABLET, FILM COATED ORAL at 21:47

## 2024-04-23 RX ADMIN — AMOXICILLIN AND CLAVULANATE POTASSIUM 1 TABLET: 500; 125 TABLET, FILM COATED ORAL at 21:47

## 2024-04-23 RX ADMIN — HEPARIN SODIUM 5000 UNITS: 5000 INJECTION INTRAVENOUS; SUBCUTANEOUS at 00:23

## 2024-04-23 RX ADMIN — PREGABALIN 75 MG: 75 CAPSULE ORAL at 00:24

## 2024-04-23 RX ADMIN — FUROSEMIDE 80 MG: 10 INJECTION, SOLUTION INTRAMUSCULAR; INTRAVENOUS at 07:00

## 2024-04-23 RX ADMIN — PRAVASTATIN SODIUM 40 MG: 40 TABLET ORAL at 21:47

## 2024-04-23 RX ADMIN — METOPROLOL TARTRATE 25 MG: 25 TABLET, FILM COATED ORAL at 09:14

## 2024-04-23 RX ADMIN — ALLOPURINOL 100 MG: 100 TABLET ORAL at 09:13

## 2024-04-23 RX ADMIN — BACITRACIN ZINC, NEOMYCIN, POLYMYXIN B 10 APPLICATION: 400; 3.5; 5 OINTMENT TOPICAL at 17:38

## 2024-04-23 RX ADMIN — SEVELAMER CARBONATE 800 MG: 800 TABLET, FILM COATED ORAL at 09:17

## 2024-04-23 RX ADMIN — METOPROLOL TARTRATE 25 MG: 25 TABLET, FILM COATED ORAL at 00:24

## 2024-04-23 RX ADMIN — SEVELAMER CARBONATE 800 MG: 800 TABLET, FILM COATED ORAL at 16:57

## 2024-04-23 RX ADMIN — PREGABALIN 75 MG: 75 CAPSULE ORAL at 21:47

## 2024-04-23 RX ADMIN — AMOXICILLIN AND CLAVULANATE POTASSIUM 1 TABLET: 500; 125 TABLET, FILM COATED ORAL at 00:24

## 2024-04-23 RX ADMIN — Medication 2000 UNITS: at 09:14

## 2024-04-23 RX ADMIN — ASPIRIN 81 MG CHEWABLE TABLET 81 MG: 81 TABLET CHEWABLE at 09:13

## 2024-04-23 RX ADMIN — AMOXICILLIN AND CLAVULANATE POTASSIUM 1 TABLET: 500; 125 TABLET, FILM COATED ORAL at 09:13

## 2024-04-23 RX ADMIN — BACITRACIN ZINC, NEOMYCIN, POLYMYXIN B 10 APPLICATION: 400; 3.5; 5 OINTMENT TOPICAL at 00:23

## 2024-04-23 RX ADMIN — CYANOCOBALAMIN TAB 1000 MCG 1000 MCG: 1000 TAB at 09:13

## 2024-04-23 SDOH — SOCIAL STABILITY: SOCIAL INSECURITY: HAVE YOU HAD THOUGHTS OF HARMING ANYONE ELSE?: NO

## 2024-04-23 SDOH — SOCIAL STABILITY: SOCIAL INSECURITY: DO YOU FEEL ANYONE HAS EXPLOITED OR TAKEN ADVANTAGE OF YOU FINANCIALLY OR OF YOUR PERSONAL PROPERTY?: NO

## 2024-04-23 SDOH — SOCIAL STABILITY: SOCIAL INSECURITY: WERE YOU ABLE TO COMPLETE ALL THE BEHAVIORAL HEALTH SCREENINGS?: YES

## 2024-04-23 SDOH — SOCIAL STABILITY: SOCIAL INSECURITY: ABUSE: ADULT

## 2024-04-23 SDOH — SOCIAL STABILITY: SOCIAL INSECURITY: DO YOU FEEL UNSAFE GOING BACK TO THE PLACE WHERE YOU ARE LIVING?: NO

## 2024-04-23 SDOH — SOCIAL STABILITY: SOCIAL INSECURITY: HAS ANYONE EVER THREATENED TO HURT YOUR FAMILY OR YOUR PETS?: NO

## 2024-04-23 SDOH — SOCIAL STABILITY: SOCIAL INSECURITY: DOES ANYONE TRY TO KEEP YOU FROM HAVING/CONTACTING OTHER FRIENDS OR DOING THINGS OUTSIDE YOUR HOME?: NO

## 2024-04-23 SDOH — SOCIAL STABILITY: SOCIAL INSECURITY: HAVE YOU HAD ANY THOUGHTS OF HARMING ANYONE ELSE?: NO

## 2024-04-23 SDOH — SOCIAL STABILITY: SOCIAL INSECURITY: ARE YOU OR HAVE YOU BEEN THREATENED OR ABUSED PHYSICALLY, EMOTIONALLY, OR SEXUALLY BY ANYONE?: NO

## 2024-04-23 SDOH — SOCIAL STABILITY: SOCIAL INSECURITY: ARE THERE ANY APPARENT SIGNS OF INJURIES/BEHAVIORS THAT COULD BE RELATED TO ABUSE/NEGLECT?: NO

## 2024-04-23 ASSESSMENT — COGNITIVE AND FUNCTIONAL STATUS - GENERAL
DAILY ACTIVITIY SCORE: 23
MOVING TO AND FROM BED TO CHAIR: A LITTLE
TURNING FROM BACK TO SIDE WHILE IN FLAT BAD: A LITTLE
MOVING FROM LYING ON BACK TO SITTING ON SIDE OF FLAT BED WITH BEDRAILS: A LITTLE
HELP NEEDED FOR BATHING: A LOT
DRESSING REGULAR UPPER BODY CLOTHING: A LITTLE
CLIMB 3 TO 5 STEPS WITH RAILING: A LOT
TOILETING: A LITTLE
WALKING IN HOSPITAL ROOM: A LOT
MOVING FROM LYING ON BACK TO SITTING ON SIDE OF FLAT BED WITH BEDRAILS: A LITTLE
TOILETING: A LITTLE
STANDING UP FROM CHAIR USING ARMS: A LITTLE
CLIMB 3 TO 5 STEPS WITH RAILING: TOTAL
TURNING FROM BACK TO SIDE WHILE IN FLAT BAD: A LITTLE
MOVING TO AND FROM BED TO CHAIR: A LITTLE
WALKING IN HOSPITAL ROOM: A LOT
MOBILITY SCORE: 16
PATIENT BASELINE BEDBOUND: NO
DRESSING REGULAR LOWER BODY CLOTHING: A LOT
MOBILITY SCORE: 15
STANDING UP FROM CHAIR USING ARMS: A LITTLE
DAILY ACTIVITIY SCORE: 17
PERSONAL GROOMING: A LITTLE

## 2024-04-23 ASSESSMENT — ACTIVITIES OF DAILY LIVING (ADL)
DRESSING YOURSELF: INDEPENDENT
PATIENT'S MEMORY ADEQUATE TO SAFELY COMPLETE DAILY ACTIVITIES?: YES
ADEQUATE_TO_COMPLETE_ADL: YES
WALKS IN HOME: NEEDS ASSISTANCE
HEARING - LEFT EAR: FUNCTIONAL
ASSISTIVE_DEVICE: WALKER
BATHING: INDEPENDENT
GROOMING: INDEPENDENT
TOILETING: INDEPENDENT
LACK_OF_TRANSPORTATION: NO
HEARING - RIGHT EAR: FUNCTIONAL
JUDGMENT_ADEQUATE_SAFELY_COMPLETE_DAILY_ACTIVITIES: YES
FEEDING YOURSELF: INDEPENDENT

## 2024-04-23 ASSESSMENT — LIFESTYLE VARIABLES
SUBSTANCE_ABUSE_PAST_12_MONTHS: NO
HOW MANY STANDARD DRINKS CONTAINING ALCOHOL DO YOU HAVE ON A TYPICAL DAY: PATIENT DOES NOT DRINK
HOW OFTEN DO YOU HAVE A DRINK CONTAINING ALCOHOL: NEVER
PRESCIPTION_ABUSE_PAST_12_MONTHS: NO
HOW OFTEN DO YOU HAVE 6 OR MORE DRINKS ON ONE OCCASION: NEVER
SKIP TO QUESTIONS 9-10: 1
AUDIT-C TOTAL SCORE: 0
AUDIT-C TOTAL SCORE: 0

## 2024-04-23 ASSESSMENT — PAIN SCALES - GENERAL
PAINLEVEL_OUTOF10: 0 - NO PAIN
PAINLEVEL_OUTOF10: 0 - NO PAIN
PAINLEVEL_OUTOF10: 5 - MODERATE PAIN
PAINLEVEL_OUTOF10: 0 - NO PAIN

## 2024-04-23 ASSESSMENT — PATIENT HEALTH QUESTIONNAIRE - PHQ9
2. FEELING DOWN, DEPRESSED OR HOPELESS: NOT AT ALL
SUM OF ALL RESPONSES TO PHQ9 QUESTIONS 1 & 2: 0
1. LITTLE INTEREST OR PLEASURE IN DOING THINGS: NOT AT ALL

## 2024-04-23 ASSESSMENT — PAIN - FUNCTIONAL ASSESSMENT
PAIN_FUNCTIONAL_ASSESSMENT: 0-10

## 2024-04-23 ASSESSMENT — PAIN DESCRIPTION - DESCRIPTORS: DESCRIPTORS: ACHING;DISCOMFORT

## 2024-04-23 NOTE — CONSULTS
Nephrology Consult Note                                                                                                                                         Inpatient consult to [facility] Care  Consult performed by: Pinky Christie DO  Consult ordered by: Syed Harris MD                                                                                                               HPI  Patient is a 79 y.o. female with ESRD who is admitted to hospital with complaints of   fall at home with scalp laceration.  Hemoglobin is dropped to 4.8. Nephrology consulted in view of ESRD.   Patient is on hemodialysis Monday Wednesday Friday at [facility] [place].  She missed her treatment yesterday.  She was recently hospitalized for abdominal pain.  She had just been discharged    Past Medical History:   Diagnosis Date    Abnormal levels of other serum enzymes     Elevated liver enzymes    Acute kidney failure (CMS-HCC)     Anemia     CKD (chronic kidney disease)     COPD (chronic obstructive pulmonary disease) (Multi)     Coronary artery disease     Disease of blood and blood-forming organs, unspecified     Bone marrow disorder    HLD (hyperlipidemia)     Hypertension     MDS (myelodysplastic syndrome) (Multi)     Personal history of other diseases of the musculoskeletal system and connective tissue     History of muscle pain    Personal history of other specified conditions     History of insomnia    Personal history of other specified conditions     History of edema    Type 2 diabetes mellitus (Multi)       Social History     Socioeconomic History    Marital status:      Spouse name: Not on file    Number of children: Not on file    Years of education: Not on file    Highest education level: Not on file   Occupational History    Not on file   Tobacco Use    Smoking status:  Never    Smokeless tobacco: Never   Vaping Use    Vaping status: Never Used   Substance and Sexual Activity    Alcohol use: Not Currently    Drug use: Never    Sexual activity: Not Currently   Other Topics Concern    Not on file   Social History Narrative    Not on file     Social Determinants of Health     Financial Resource Strain: Low Risk  (2024)    Overall Financial Resource Strain (CARDIA)     Difficulty of Paying Living Expenses: Not hard at all   Food Insecurity: No Food Insecurity (2024)    Hunger Vital Sign     Worried About Running Out of Food in the Last Year: Never true     Ran Out of Food in the Last Year: Never true   Transportation Needs: No Transportation Needs (2024)    PRAPARE - Transportation     Lack of Transportation (Medical): No     Lack of Transportation (Non-Medical): No   Physical Activity: Not on file   Stress: Not on file   Social Connections: Feeling Somewhat Isolated (2024)    OASIS : Social Isolation     Frequency of experiencing loneliness or isolation: Sometimes   Intimate Partner Violence: Not on file   Housing Stability: Low Risk  (2024)    Housing Stability Vital Sign     Unable to Pay for Housing in the Last Year: No     Number of Places Lived in the Last Year: 1     Unstable Housing in the Last Year: No      No family history on file.   No current facility-administered medications on file prior to encounter.     Current Outpatient Medications on File Prior to Encounter   Medication Sig Dispense Refill    allopurinol (Zyloprim) 100 mg tablet Take 1 tablet (100 mg) by mouth once daily. 90 tablet 1    amLODIPine (Norvasc) 5 mg tablet Take 1 tablet (5 mg) by mouth once daily.      amoxicillin-pot clavulanate (Augmentin) 875-125 mg tablet Take 1 tablet by mouth 2 times a day for 10 days. 20 tablet 0    [] cefdinir (Omnicef) 300 mg capsule Take 1 capsule (300 mg) by mouth 2 times a day for 4 days. 8 capsule 0    cholecalciferol (Vitamin D-3) 50  "MCG (2000 UT) tablet Take 1 tablet (2,000 Units) by mouth once daily.      cyanocobalamin (Vitamin B-12) 1,000 mcg tablet Take 1 tablet (1,000 mcg) by mouth once daily.      metoprolol tartrate (Lopressor) 25 mg tablet Take 1 tablet (25 mg) by mouth 2 times a day. 60 tablet 3    pioglitazone (Actos) 15 mg tablet Take 1 tablet (15 mg) by mouth once daily. 30 tablet 3    pravastatin (Pravachol) 40 mg tablet Take 1 tablet (40 mg) by mouth once daily at bedtime. 90 tablet 1    pregabalin (Lyrica) 100 mg capsule TAKE ONE CAPSULE BY MOUTH TWICE DAILY @9AM & 5PM 60 capsule 0    sevelamer carbonate (Renvela) 800 mg tablet Take 1 tablet (800 mg) by mouth 3 times a day with meals. Swallow tablet whole; do not crush, break, or chew. 30 tablet 1    SITagliptin phosphate (Januvia) 25 mg tablet Take 1 tablet (25 mg) by mouth once daily. 30 tablet 3      Scheduled medications  allopurinol, 100 mg, oral, Daily  amLODIPine, 5 mg, oral, Daily  amoxicillin-pot clavulanate, 1 tablet, oral, BID  aspirin, 81 mg, oral, Daily  cholecalciferol, 2,000 Units, oral, Daily  cyanocobalamin, 1,000 mcg, oral, Daily  heparin (porcine), 5,000 Units, subcutaneous, q12h  insulin lispro, 0-5 Units, subcutaneous, TID with meals  metoprolol tartrate, 25 mg, oral, BID  neomycin-bacitracnZn-polymyxnB, , Topical, Daily  pravastatin, 40 mg, oral, Nightly  pregabalin, 75 mg, oral, BID  sevelamer carbonate, 800 mg, oral, TID with meals      Continuous medications     PRN medications  PRN medications: acetaminophen, dextrose, dextrose, glucagon, glucagon, ipratropium-albuteroL, melatonin, ondansetron **OR** ondansetron, polyethylene glycol     Review of systems  as per HPI otherwise 10 point review systems negative    /66 (BP Location: Right arm, Patient Position: Lying)   Pulse 66   Temp 36.6 °C (97.8 °F) (Temporal)   Resp 18   Ht 1.549 m (5' 1\")   Wt 68 kg (150 lb)   SpO2 93%   BMI 28.34 kg/m²     Input / Output:  24 HR:   Intake/Output Summary " (Last 24 hours) at 4/23/2024 1258  Last data filed at 4/23/2024 1036  Gross per 24 hour   Intake 160 ml   Output 335 ml   Net -175 ml       Physical Exam   Alert and oriented x 3, NAD  Bandage on head  EOMI  OP clear  Neck: supple, No JVD  Right IJ TDC  CV: RRR without m/r/g  Lungs: CTA bilaterally  Abd: soft NT/ND +BS  Ext: no lower extremity edema   Neuro: grossly intact  Skin: no rashes    Results from last 7 days   Lab Units 04/23/24  0333 04/22/24  1601 04/19/24  0526   SODIUM mmol/L 138 137 141   POTASSIUM mmol/L 4.5 5.1 3.9   CHLORIDE mmol/L 105 103 103   CO2 mmol/L 27 23 29   BUN mg/dL 53* 47* 27*   CREATININE mg/dL 3.39* 3.03* 2.18*   GLUCOSE mg/dL 129* 161* 104*   CALCIUM mg/dL 9.0 10.0 9.6        Results from last 7 days   Lab Units 04/23/24  0333   SODIUM mmol/L 138   POTASSIUM mmol/L 4.5   CHLORIDE mmol/L 105   CO2 mmol/L 27   BUN mg/dL 53*   CREATININE mg/dL 3.39*   CALCIUM mg/dL 9.0   PROTEIN TOTAL g/dL 5.2*   BILIRUBIN TOTAL mg/dL 0.4   ALK PHOS U/L 110   ALT U/L 19   AST U/L 14   GLUCOSE mg/dL 129*      Results from last 7 days   Lab Units 04/22/24  1601 04/19/24  0526   MAGNESIUM mg/dL 1.81 2.00      Results from last 7 days   Lab Units 04/23/24  0444 04/22/24  1601 04/19/24  0526   WBC AUTO x10*3/uL 5.9 13.7* 4.0*   HEMOGLOBIN g/dL 4.8* 7.0* 7.0*   HEMATOCRIT % 15.3* 21.8* 21.9*   PLATELETS AUTO x10*3/uL 235 322 206        CT cervical spine wo IV contrast   Final Result        High posterior left parietal scalp laceration with small scalp   hematoma. No depressed skull fracture. No acute intracranial bleed or   focal mass effect.        Moderate volume loss in the brain.        No CT evidence of cervical spine fracture in this exam.        Cervical spine DJD as described.        Ground-glass infiltrative nodular density in the posterior right lung   apex is stable to slightly larger compared to CT scan chest from   04/13/2024. This was not present back on 04/06/2022. Small pneumonia   versus  developing infiltrative tumor. Consider further evaluation   with PET-CT scan.        MACRO:   None        Signed by: Saurabh Suarez 4/22/2024 4:58 PM   Dictation workstation:   [id number]      CT head wo IV contrast   Final Result        High posterior left parietal scalp laceration with small scalp   hematoma. No depressed skull fracture. No acute intracranial bleed or   focal mass effect.        Moderate volume loss in the brain.        No CT evidence of cervical spine fracture in this exam.        Cervical spine DJD as described.        Ground-glass infiltrative nodular density in the posterior right lung   apex is stable to slightly larger compared to CT scan chest from   04/13/2024. This was not present back on 04/06/2022. Small pneumonia   versus developing infiltrative tumor. Consider further evaluation   with PET-CT scan.        MACRO:   None        Signed by: Saurabh Suarez 4/22/2024 4:58 PM   Dictation workstation:   [id number]           Assessment:   Patient is 79 y.o. female who is admitted to hospital for fall with acute blood loss anemia after scalp laceration. Nephrology consulted in view of ESRD.     ESRD- HD MWF-[facility]        Anemia acute on chronic with hx of MDS-   Has been getting Micera and iron at her HD unit. 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    Please message me through Other Machine chat with any questions or concerns.     Pinky Christie DO  4/23/2024  12:58 PM         [facility]    [address], [address]   [place] [address]  Office: [phone]

## 2024-04-23 NOTE — PROGRESS NOTES
Medication Adjustment    The following medication(s) was/were adjusted for Tana Hargrove after discussion with the provider due to altered renal function.    Medication(s) adjusted:   Pregabalin 100mg BID decreased to 75mg BID for CrCl = 13.1 mL/min    Jalyn Grace, PharmD

## 2024-04-23 NOTE — NURSING NOTE
1658: Pre transfusion vitals 98.8, 86 bpm, 18rr, 134/52, 91% room air.  Ordered blood transfusion started.   1703: Start of transfusion vitals: 98.9, 82 bpm, 18rr, 140/53, 93% room air.   Pt denies any feelings of discomfort, no signs of SOB or difficulty breathing.  15 minute vitals: 1718: 98.6,  77 bpm, 18rr, 145/75, 93% room air. Transfusion rate increased to 115mls/hr.

## 2024-04-23 NOTE — PROGRESS NOTES
Medication Adjustment    The following medication(s) was/were adjusted for Tana Hargrove per protocol/policy due to altered renal function.    Medication(s) adjusted:   Augmentin 875/125mg q12hrs decreased to 500/125mg q12hrs for CrCl = 13.1 mL/min    Jalyn Grace, PharmD

## 2024-04-23 NOTE — PROGRESS NOTES
04/23/24 1420   Discharge Planning   Living Arrangements Alone   Support Systems Family members   Assistance Needed minimal   Type of Residence Private residence   Home or Post Acute Services None   Patient expects to be discharged to: home     I spoke with patient to introduce discharge planning. Pt states she has been fairly independent at home where she lives alone and uses a rollater.  She states her daughter comes over on the weekend to help with housework.  Pt does her own laundry, drives.  She had a fall which brought her to the hospital.  She is receiving HD at this time.  She states she is planning on return home on discharge.  I let her know the PT will be working with her tomorrow (her hgb was too low today) to help with DC needs.  TCC to continue to follow to manage discharge planning and monitor for discharge needs.

## 2024-04-23 NOTE — PROGRESS NOTES
Occupational Therapy                 Therapy Communication Note    Patient Name: Tana Hargrove  MRN: [id number]  Today's Date: 4/23/2024     Discipline: Occupational Therapy    Missed Visit Reason: Missed Visit Reason: Patient in a medical procedure (OT eval attempted. pt with critically low hemoglobim today. to get transfusion. now is in dialysis at 14:14.)    Missed Time: Attempt    Comment:

## 2024-04-23 NOTE — CONSULTS
Wound Care Consult     Visit Date: 4/23/2024      Patient Name: Tana Hargrove         MRN: [id number]           YOB: 1945    Patient was seen 4/19 for for wound consult on last admission for diabetic wound to right foot (present on admission) complicated by PMH: HTN, HLD, CAD, HFpEF, COPD (no baseline O2), NIDDM2, ESRD on HD, chronic anemia, OA, anxiety and depression, chronic diabetic foot wound, DM and GERD per chart review. Patient known to this RN from multiple previous admissions. No need for wound consult at this time as patient was seen less than a week ago. Patient also has laceration to head, 2 staples placed in ED per chart review. Recommendations below reviewed with bedside RN Lacie and Dr. Carr. Bedside RN to obtain admission photo of wound/measurements.      Wound location: Right plantar foot    Treatment protocol recommended:  Paint with betadine swab, apply clean dry dressing every 3 days/prn.   Continue to off load, turning at least every 2 hours. Offload heels.     Therapeutic surface: Patient on [employer] standard pressure relieving mattress. Staff to continue to turn/reposition patient atleast every 2 hours. Offload heels.      Nursing updated, continue pressure injury preventions, nursing to follow provider orders and re-consult wound RN if needed.     See above recommendations for treatment. I would recommend follow up at wound center as needed upon discharge.      Please contact me with questions or changes in patient condition.  Cristina Dove RN  [facility]          [phone]

## 2024-04-23 NOTE — NURSING NOTE
Pt being transported to dialysis. Unit of blood continues to run. Vitals stable. Pt alert and oriented. Handoff at bedside to lincoln Brink RN. Blood running at 115mls/hr. Awaiting 2nd unit to be ready from blood bank.

## 2024-04-23 NOTE — NURSING NOTE
Upon entering room to medicate for c/o pain at time of admission/contacting doctor for pain medication order, patient found to be asleep and not appearing in any distress.

## 2024-04-23 NOTE — CARE PLAN
The patient's goals for the shift include   Problem: Pain - Adult  Goal: Verbalizes/displays adequate comfort level or baseline comfort level  Outcome: Progressing     Problem: Safety - Adult  Goal: Free from fall injury  Outcome: Progressing     Problem: Discharge Planning  Goal: Discharge to home or other facility with appropriate resources  Outcome: Progressing     Problem: Chronic Conditions and Co-morbidities  Goal: Patient's chronic conditions and co-morbidity symptoms are monitored and maintained or improved  Outcome: Progressing     Problem: Diabetes  Goal: Achieve decreasing blood glucose levels by end of shift  Outcome: Progressing  Goal: Increase stability of blood glucose readings by end of shift  Outcome: Progressing  Goal: Decrease in ketones present in urine by end of shift  Outcome: Progressing  Goal: Maintain electrolyte levels within acceptable range throughout shift  Outcome: Progressing  Goal: Maintain glucose levels >70mg/dl to <250mg/dl throughout shift  Outcome: Progressing  Goal: No changes in neurological exam by end of shift  Outcome: Progressing  Goal: Learn about and adhere to nutrition recommendations by end of shift  Outcome: Progressing  Goal: Vital signs within normal range for age by end of shift  Outcome: Progressing  Goal: Increase self care and/or family involovement by end of shift  Outcome: Progressing  Goal: Receive DSME education by end of shift  Outcome: Progressing     Problem: Nutrition  Goal: Less than 5 days NPO/clear liquids  Outcome: Progressing  Goal: Oral intake greater than 50%  Outcome: Progressing  Goal: Oral intake greater 75%  Outcome: Progressing  Goal: Consume prescribed supplement  Outcome: Progressing  Goal: Adequate PO fluid intake  Outcome: Progressing  Goal: Nutrition support goals are met within 48 hrs  Outcome: Progressing  Goal: Nutrition support is meeting 75% of nutrient needs  Outcome: Progressing  Goal: Tube feed tolerance  Outcome:  Progressing  Goal: BG  mg/dL  Outcome: Progressing  Goal: Lab values WNL  Outcome: Progressing  Goal: Electrolytes WNL  Outcome: Progressing  Goal: Promote healing  Outcome: Progressing  Goal: Maintain stable weight  Outcome: Progressing  Goal: Reduce weight from edema/fluid  Outcome: Progressing  Goal: Gradual weight gain  Outcome: Progressing  Goal: Improve ostomy output  Outcome: Progressing     Problem: Skin  Goal: Decreased wound size/increased tissue granulation at next dressing change  Outcome: Progressing  Flowsheets (Taken 4/23/2024 0952)  Decreased wound size/increased tissue granulation at next dressing change:   Protective dressings over bony prominences   Promote sleep for wound healing  Goal: Participates in plan/prevention/treatment measures  Outcome: Progressing  Flowsheets (Taken 4/23/2024 0952)  Participates in plan/prevention/treatment measures:   Elevate heels   Discuss with provider PT/OT consult   Increase activity/out of bed for meals  Goal: Prevent/manage excess moisture  Outcome: Progressing  Flowsheets (Taken 4/23/2024 0952)  Prevent/manage excess moisture:   Monitor for/manage infection if present   Cleanse incontinence/protect with barrier cream  Goal: Prevent/minimize sheer/friction injuries  Outcome: Progressing  Flowsheets (Taken 4/23/2024 0952)  Prevent/minimize sheer/friction injuries:   Turn/reposition every 2 hours/use positioning/transfer devices   Complete micro-shifts as needed if patient unable. Adjust patient position to relieve pressure points, not a full turn   Increase activity/out of bed for meals  Goal: Promote/optimize nutrition  Outcome: Progressing  Flowsheets (Taken 4/23/2024 0952)  Promote/optimize nutrition: Monitor/record intake including meals  Goal: Promote skin healing  Outcome: Progressing  Flowsheets (Taken 4/23/2024 0952)  Promote skin healing:   Assess skin/pad under line(s)/device(s)   Turn/reposition every 2 hours/use positioning/transfer devices    Protective dressings over bony prominences     Problem: Fall/Injury  Goal: Not fall by end of shift  Outcome: Progressing  Goal: Be free from injury by end of the shift  Outcome: Progressing  Goal: Verbalize understanding of personal risk factors for fall in the hospital  Outcome: Progressing  Goal: Verbalize understanding of risk factor reduction measures to prevent injury from fall in the home  Outcome: Progressing  Goal: Use assistive devices by end of the shift  Outcome: Progressing  Goal: Pace activities to prevent fatigue by end of the shift  Outcome: Progressing     Problem: Pain  Goal: Takes deep breaths with improved pain control throughout the shift  Outcome: Progressing  Goal: Turns in bed with improved pain control throughout the shift  Outcome: Progressing  Goal: Walks with improved pain control throughout the shift  Outcome: Progressing  Goal: Performs ADL's with improved pain control throughout shift  Outcome: Progressing  Goal: Participates in PT with improved pain control throughout the shift  Outcome: Progressing  Goal: Free from opioid side effects throughout the shift  Outcome: Progressing  Goal: Free from acute confusion related to pain meds throughout the shift  Outcome: Progressing

## 2024-04-23 NOTE — PROGRESS NOTES
Tana Hargrove is a 79 y.o. female admitted for Fall, initial encounter. Pharmacy reviewed the patient's [id number] medications and allergies for accuracy.    The list below reflects the PTA list prior to pharmacy medication history. A summary a changes to the PTA medication list has been listed below. Please review each medication in order reconciliation for additional clarification and justification.    Source of information:  Discharge Paperwork 04/19/2024    Medications added:    Medications modified:    Medications to be removed:  Cefdinir 300mg     Medications of concern:      Prior to Admission Medications   Prescriptions Last Dose Informant Patient Reported? Taking?   SITagliptin phosphate (Januvia) 25 mg tablet   No No   Sig: Take 1 tablet (25 mg) by mouth once daily.   allopurinol (Zyloprim) 100 mg tablet   No No   Sig: Take 1 tablet (100 mg) by mouth once daily.   amLODIPine (Norvasc) 5 mg tablet   Yes No   Sig: Take 1 tablet (5 mg) by mouth once daily.   amoxicillin-pot clavulanate (Augmentin) 875-125 mg tablet   No No   Sig: Take 1 tablet by mouth 2 times a day for 10 days.   cefdinir (Omnicef) 300 mg capsule   No No   Sig: Take 1 capsule (300 mg) by mouth 2 times a day for 4 days.   cholecalciferol (Vitamin D-3) 50 MCG (2000 UT) tablet   Yes No   Sig: Take 1 tablet (2,000 Units) by mouth once daily.   cyanocobalamin (Vitamin B-12) 1,000 mcg tablet   Yes No   Sig: Take 1 tablet (1,000 mcg) by mouth once daily.   metoprolol tartrate (Lopressor) 25 mg tablet   No No   Sig: Take 1 tablet (25 mg) by mouth 2 times a day.   pioglitazone (Actos) 15 mg tablet   No No   Sig: Take 1 tablet (15 mg) by mouth once daily.   pravastatin (Pravachol) 40 mg tablet   No No   Sig: Take 1 tablet (40 mg) by mouth once daily at bedtime.   pregabalin (Lyrica) 100 mg capsule   No No   Sig: TAKE ONE CAPSULE BY MOUTH TWICE DAILY @9AM & 5PM   sevelamer carbonate (Renvela) 800 mg tablet   No No   Sig: Take 1 tablet (800 mg)  by mouth 3 times a day with meals. Swallow tablet whole; do not crush, break, or chew.      Facility-Administered Medications: None       Keena Flores

## 2024-04-23 NOTE — H&P
"ADDENDUM: hg this am 4.8 from 7, likely equilibrated w/ recent blood loss in scalp, no current evidence of ongoing bleeding. will transfuse 2 units pRBC. should likely receive HD today to help with volume, monitor respiratory status closely. consider CT a/p to r/o RP bleed if doesn't appropriately respond.     Medicine History & Physical:    CC: fall    HPI:  79F hx CAD, HTN, COPD, ESRD on HD via permacath, DM2, MDS, gout, neuropathy presents for fall    patient thinks that she slipped on the floor this afternoon and fell backwards because of that but unsure. thinks knees may have given out as well. felt well after her hospitalization w/o abd pain, n/v, generalized or focal weakness, f/c. has been eating ok. denies dizziness or syncope/loss of consciousness. fell back and hit her buttocks and head. head bled for awhile, now self-resolved w/o sutures. no pain in buttocks/back. no other injuries. not able to get up on own until got help 4h later. no recent cp, sob, diarrhea, dysuria, ha, blurry vision, numbness. taking asa.     PMH: CAD, HTN, COPD, ESRD on HD via permacath, DM2, MDS, gout, neuropathy  PSH: hysterectomy, breast lumpectomy, appe, colo, permacath  FH: n/a  SH: lives alone. daughter involved. uses a walker. no etoh, tobacco, drugs  Meds: see med rec for full.   Allergies: extensive (see list)    ED Course:  VSS. minor scalp lac on exam, no active bleeding. labs/imaging w/ wbc 13.7 (neuts), hg 7 (stable), ck wnl. ct head w/ scalp lac/small hematoma. ekg wnl. admitted to medicine.    ROS reviewed and negative other than noted in HPI    Visit Vitals  /70   Pulse 76   Temp 35.6 °C (96.1 °F) (Temporal)   Resp 20   Ht 1.549 m (5' 1\")   Wt 68 kg (150 lb)   SpO2 97%   BMI 28.34 kg/m²   OB Status Hysterectomy   Smoking Status Never   BSA 1.71 m²     Physical Exam:  General: NAD, well-appearing, cooperative. no jaundice, pallor, rash, bruises  HEENT: NC/AT, MMM, no oral lesions or pharyngeal erythema. PERRL. " no scleral icterus or conjunctival injection. neck soft w/o masses or LAD. superficial scalp lac well-approximated. no active bleed  CV: RRR, no murmurs, rubs, or gallops. No JVD.  Chest: breathing unlabored. CTAB w/ adequate air-entry. permacath c/d/i  Abd: non-distended. BS+. soft, non-tender. no masses or organomegaly.  Extr: wwp, 2+ and symmetric peripheral pulses. no LE edema.  Neuro: AAOx3. CNII-XII intact. no focal findings.     Results:  - all relevant laboratory and radiographic data reviewed    Assessment/Plan:  79F hx CAD, HTN, COPD, ESRD on HD via permacath, DM2, MDS, gout, neuropathy presents for fall    #ground level fall, mechanical:  #L parietal scalp laceration/small hematoma s/p staples x2:  slipped on floor. no pre/syncope or new focal deficits. no other injuries. ct head w/o ich. no active scalp bleeding. on asa, no anticoagulants  - ekg, ck, ct head/cspine wnl; f/u cxr, pelvic xr  - pt eval; fall precautions; wound care; needs staples removed in 7d    #ESRD on HD via permacath:   missed hd today 4/22, no urgent needs  - consult renal; sevelamer, vid d    #?hx acute cholecystitis:   unclear dx from last hospitalization. sx improved.  - continue augmentin to finish 10d  - surgery and cards f/u (for pre-op eval)    #CAD: stable; mtp, statin, asa  #HTN: stable; amlodipine, mtp  #COPD: stable; albuterol prn  #DM2: bg wnl. iss; hold outpt meds  #MDS: cell counts stable; outpt heme f/u  #gout: home allopurinol  #neuropathy: home lyrica    #FEN/GI: renal/diabetic  #PPx: heparin sq  #Lines/Tubes/Drains: piv, permacath  #Analgesia/Sedation: tylenol  #Code: full  #Dispo: pending clinical stability  #Contact: tremaine daniel [phone]    Syed Harris MD

## 2024-04-23 NOTE — NURSING NOTE
Pt seen in follow-up this AM re her Polysubstance Use. Pt admitted for Pancreatitis and now has C. DIff infection-on isolation. She went outside yesterday to get some exercise and tripped/fell without injury.-difficulty getting up. ROS: no CP/SOB, +abdominal pain no N/V +losses stools-on/off no cravings mild anxiety no localized body pain PE: Pt is A&O in NAD preparing to eat breakfast /58/112/21/98.9 Lungs-clear B/L BS COR-reg S1S2 Ab Report from Sending RN:    Report From: Lacie Olson  Recent Surgery of Procedure: No  Baseline Level of Consciousness (LOC): Alert & Oriented X's 3  Oxygen Use: No  Type: room air  Diabetic: No  Last BP Med Given Day of Dialysis: see EMAR  Last Pain Med Given: see EMAR  Lab Tests to be Obtained with Dialysis: No  Blood Transfusion to be Given During Dialysis: Yes  Available IV Access: Yes  Medications to be Administered During Dialysis: No  Continuous IV Infusion Running: No  Restraints on Currently or in the Last 24 Hours: No  Hand-Off Communication: Patient currently receiving a unit of blood, with a second unit to follow because of a hemoglobin in the 4's.  Dialysis Catheter Dressing: will assess when patient arrives     Pt seen in follow-up this AM re her Polysubstance Use. Pt admitted for Pancreatitis and now has C. DIff infection-on isolation. She went outside yesterday to get some exercise and tripped/fell without injury-difficulty getting up. ROS: no CP/SOB, +abdominal pain no N/V +losses stools-on/off no cravings mild anxiety no localized body pain PE: Pt is A&O in NAD preparing to eat breakfast /58/112/21/98.9 Lungs-clear B/L BS COR-reg S1S2 Abd soft mild diffuse tenderness BS+ Labs H/H 7.8/25  K+4.1 Abd X-Ray nonobstructive bowel gas pattern. A/P: Pt with H/O AUD/Cocaine Use Disorder being treated for Acute Pancreatitis/C. Diff now on Dilaudid for pain and PRN Ativan for Anxiety. Pt not demonstrating any withdrawal symptoms. Will refer to Outpatient Addiction Services/MAT upon discharge.

## 2024-04-23 NOTE — NURSING NOTE
Report to Receiving RN:    Report To: Meghana  Time Report Called: 3:46  Hand-Off Communication: One unit of blood transfused.  Removed 1 liter of fluid last /57  Complications During Treatment: No  Ultrafiltration Treatment: No  Medications Administered During Dialysis: No  Blood Products Administered During Dialysis: Yes  Labs Sent During Dialysis: No  Heparin Drip Rate Changes: No  Dialysis Catheter Dressing: clean and dry  Last Dressing Change: 4/22/2024    Electronic Signatures:  Александр Chaudhry RN       Last Updated: 3:45 PM by АЛЕКСАНДР CHAUDHRY

## 2024-04-23 NOTE — PROGRESS NOTES
Physical Therapy                 Therapy Communication Note    Patient Name: Tana Hargrove  MRN: [id number]  Today's Date: 4/23/2024     Discipline: Physical Therapy    Missed Visit Reason: Missed Visit Reason: Patient in a medical procedure (off unit, at dialysis. Reattempt tomorrow.)    Missed Time: Attempt    Comment:

## 2024-04-23 NOTE — NURSING NOTE
1020: Pre transfusion vitals 98.9, 63 bpm, 18rr, 138/64, 94% room air.  Ordered blood transfusion started.   1030: Start of transfusion vitals: 98.6, 63 bpm, 18rr, 134/69, 94% room air.   Pt denies any feelings of discomfort, no signs of SOB or difficulty breathing.  15 minute vitals: 98.4, 62bpm, 18rr, 120/68, 97% room air.   Pt resting comfortably. Denies any needs at this time.

## 2024-04-23 NOTE — CONSULTS
Nutrition Initial Assessment:   Nutrition Assessment    Reason for Assessment: Admission nursing screening    Medical history per chart:   CAD, HTN, COPD, ESRD on HD via permacath, DM2, MDS, gout, neuropathy     HPI:  Patient presents from home s/p fall    4/23:  Patient receiving dialysis at time of visit, history obtained from chart review.  Noted wounds present, will trial NSA mighty shake once per day to help promote healing, adjust as needed.  Will monitor diet adequacy.        Current Diet: Adult diet Carb Controlled, Renal; 60 gram carb/meal, 30 gram Carb evening snack; 2000 mL fluid; Potassium Restricted 2 gm (50mEq); 2 - 3 grams Sodium  Supplement(s): No  Average meal Intake during admission:  new admission, no meal intake documented to assess        Nutrition Related Findings:     BM: Last BM Date: 04/22/24  Wound Type:  diabetic ulcer to left heel and right foot, head laceration   (nursing/wound notes provide further details)    Food allergies: NKFA. is allergic to codeine, hydrocodone, hydrocodone-acetaminophen, meperidine (pf), tramadol, piperacillin-tazobactam, adhesive tape-silicones, and meperidine.  Meds/Labs reviewed.  allopurinol, 100 mg, oral, Daily  amLODIPine, 5 mg, oral, Daily  amoxicillin-pot clavulanate, 1 tablet, oral, BID  aspirin, 81 mg, oral, Daily  cholecalciferol, 2,000 Units, oral, Daily  cyanocobalamin, 1,000 mcg, oral, Daily  heparin (porcine), 5,000 Units, subcutaneous, q12h  insulin lispro, 0-5 Units, subcutaneous, TID with meals  metoprolol tartrate, 25 mg, oral, BID  neomycin-bacitracnZn-polymyxnB, , Topical, Daily  pravastatin, 40 mg, oral, Nightly  pregabalin, 75 mg, oral, BID  sevelamer carbonate, 800 mg, oral, TID with meals             Nutrition Significant Labs:    Results from last 7 days   Lab Units 04/23/24  0333 04/22/24  1601 04/19/24  0526   GLUCOSE mg/dL 129* 161* 104*   SODIUM mmol/L 138 137 141   POTASSIUM mmol/L 4.5 5.1 3.9   CHLORIDE mmol/L 105 103 103   CO2  "mmol/L 27 23 29   BUN mg/dL 53* 47* 27*   CREATININE mg/dL 3.39* 3.03* 2.18*   EGFR mL/min/1.73m*2 13* 15* 22*   CALCIUM mg/dL 9.0 10.0 9.6   MAGNESIUM mg/dL  --  1.81 2.00     Lab Results   Component Value Date    HGBA1C 6.3 (H) 03/14/2024    HGBA1C 6.6 (H) 02/26/2024     Results from last 7 days   Lab Units 04/23/24  1207 04/23/24  0652   POCT GLUCOSE mg/dL 128* 125*       Anthropometrics:  Height: 154.9 cm (5' 1\")   Weight: 68 kg (150 lb)   BMI (Calculated): 28.36  IBW/kg (Dietitian Calculated): 47.7 kg       Weight History:   Wt Readings from Last 10 Encounters:   04/22/24 68 kg (150 lb)   04/19/24 68 kg (149 lb 14.6 oz)   04/16/24 69.4 kg (153 lb)   04/13/24 67.7 kg (149 lb 4 oz)   03/25/24 66.7 kg (147 lb)   03/05/24 67.1 kg (147 lb 14.9 oz)   02/14/24 75.7 kg (166 lb 14.2 oz)   02/09/24 76.9 kg (169 lb 8.5 oz)   01/08/24 75.3 kg (166 lb 0.1 oz)   10/16/23 75.3 kg (166 lb)        Weight Change %:  Weight History / % Weight Change: History of weight loss per noted weight records however recent weight appears stable, possible change initially secondary to fluid status.  (1/8/24 166 lb, 2/9/24 169 lb, 3/5/24 147 lb, 4/16/24 153 lb, 4/22/24 150 lb)  Significant Weight Loss:  (unable to determine significance of weight changes at this time.)          Nutrition Focused Physical Exam Findings:  defer: patient receiving dialysis     Physical Findings:  Skin: Positive (wounds)    Estimated Needs:   Total Energy Estimated Needs (kCal):  (6206-3182)  Method for Estimating Needs: 30, IBW  Total Protein Estimated Needs (g):  (60-65)  Method for Estimating Needs: 1.3, IBW     Method for Estimating Needs: per physician        Nutrition Diagnosis   Nutrition Diagnosis:  Malnutrition Diagnosis  Patient has Malnutrition Diagnosis:  (unable to determine at this time)    Nutrition Diagnosis  Patient has Nutrition Diagnosis: Yes  Diagnosis Status (1): New  Nutrition Diagnosis 1: Increased nutrient needs  Related to (1): wound " healing  As Evidenced by (1): diabetic ulcerations to left heel, right foot, head laceration       Nutrition Interventions/Recommendations   Nutrition Interventions and Recommendations:        Nutrition Prescription:  Individualized Nutrition Prescription Provided for : Continue carbohydrate controlled, renal diet with fluid restriction per physician.  Trial NSA mighty shake daily.        Nutrition Interventions:   Food and/or Nutrient Delivery Interventions  Interventions: Meals and snacks, Medical food supplement  Meals and Snacks: Carbohydrate-modified diet, Mineral-modified diet, Fluid-modified diet  Goal: >75% intake of meals  Medical Food Supplement: Commercial beverage  Goal: >75% intake of ONS daily         Nutrition Education:   Education Documentation  No documentation found.      Nutrition Counseling  Counseling Theoretical Approach: Other (Comment)  Goal: N/A - patient unavailable       Nutrition Monitoring and Evaluation   Monitoring/Evaluation:   Food/Nutrient Related History Monitoring  Monitoring and Evaluation Plan: Energy intake  Energy Intake: Estimated energy intake  Criteria: meet >75% of estimated needs from diet and ONS    Body Composition/Growth/Weight History  Monitoring and Evaluation Plan: Weight  Weight: Weight change  Criteria: Maintain stable weight    Biochemical Data, Medical Tests and Procedures  Monitoring and Evaluation Plan: Electrolyte/renal panel, Glucose/endocrine profile  Electrolyte and Renal Panel: BUN, Creatinine, Phosphorus, Potassium, Sodium  Criteria: WNL  Glucose/Endocrine Profile: Glucose, casual  Criteria: WNL    Nutrition Focused Physical Findings  Monitoring and Evaluation Plan: Skin  Skin: Impaired wound healing  Criteria: Promote wound healing            Time Spent/Follow-up Reminder:   Follow Up  Time Spent (min): 45 minutes  Last Date of Nutrition Visit: 04/23/24  Nutrition Follow-Up Needed?: Dietitian to reassess per policy  Follow up Comment: 4/26-4/29

## 2024-04-23 NOTE — PROGRESS NOTES
Tana Hargrove is a 79 y.o. female on day 1 of admission presenting with Fall, initial encounter.      Subjective   79F hx CAD, HTN, COPD, ESRD on HD via permacath, DM2, MDS, gout, neuropathy presents for fall     Patient thinks that she slipped on the floor this afternoon and fell backwards because of that but unsure. thinks knees may have given out as well. felt well after her hospitalization w/o abd pain, n/v, generalized or focal weakness, f/c. has been eating ok. denies dizziness or syncope/loss of consciousness. fell back and hit her buttocks and head. head bled for awhile, now self-resolved w/o sutures. no pain in buttocks/back. no other injuries. not able to get up on own until got help 4h later. no recent cp, sob, diarrhea, dysuria, ha, blurry vision, numbness. taking asa.     4/23: Patient was seen and examined.  She is awake and alert and interactive today.  Multiple changes of bandage dressing due to soaked with blood.  Being transfused with 1 unit of packed red blood cells and 1 unit coming up later today.  Will repeat H&H likely transfuse with 10 units.  Patient completed hemodialysis later today.  PT OT to evaluate for functional status.  Repeat H&H at 6 PM.  Repeat CBC and BMP.        Objective     Last Recorded Vitals  /66 (BP Location: Right arm, Patient Position: Lying)   Pulse 66   Temp 36.6 °C (97.8 °F) (Temporal)   Resp 18   Wt 68 kg (150 lb)   SpO2 93%   Intake/Output last 3 Shifts:    Intake/Output Summary (Last 24 hours) at 4/23/2024 1320  Last data filed at 4/23/2024 1300  Gross per 24 hour   Intake 160 ml   Output 335 ml   Net -175 ml       Admission Weight  Weight: 68 kg (150 lb) (04/22/24 1443)    Daily Weight  04/22/24 : 68 kg (150 lb)    Image Results  ECG 12 lead  Sinus rhythm  Nonspecific intraventricular conduction delay  Nonspecific T abnrm, anterolateral leads  Artifact in lead(s) I,II,III,aVR,aVL,aVF,V1,V2  XR chest 1 view  Narrative: Interpreted By:  Rashida Sanchez,    STUDY:  XR CHEST 1 VIEW;  4/23/2024 6:13 am      INDICATION:  Signs/Symptoms:fall elderly      COMPARISON:  Chest x-ray 04/13/2024.      ACCESSION NUMBER(S):  [id number]      ORDERING CLINICIAN:  FOREIGN CHARLTON      TECHNIQUE:  Portable upright frontal view of the chest was obtained .      FINDINGS:  There is a right-sided dual-lumen central catheter with the tip  overlying the region of the right atrium.      The cardiomediastinal silhouette is within normal limits.      No focal consolidation, pleural effusion or pneumothorax.      There is elevation of the right hemidiaphragm.      Impression: 1.  No radiographic evidence of acute cardiopulmonary process.              MACRO:  None.      Signed by: Rashida Sanchez 4/23/2024 6:44 AM  Dictation workstation:   [id number]  XR pelvis 1-2 views  Narrative: Interpreted By:  Rashida Sanchez,   STUDY:  XR PELVIS 1-2 VIEWS;  4/23/2024 6:13 am      INDICATION:  Signs/Symptoms:fall elderly.      COMPARISON:  Right hip x-ray 08/11/2023.      ACCESSION NUMBER(S):  [id number]      ORDERING CLINICIAN:  FOREIGN CHARLTON      FINDINGS:  1 AP supine portable view of the pelvis was obtained.      There is no acute fracture or dislocation. The pelvic ring appears  intact. The bilateral hip joints are maintained. Partially visualized  orthopedic hardware is transfixing the proximal right femur.  Partially visualized orthopedic hardware at the lower lumbar spine.  Vascular calcifications are noted. The soft tissues are unremarkable.      Impression: 1.  No radiographic evidence for acute fracture.              MACRO:  None.      Signed by: Rashida Sanchez 4/23/2024 6:40 AM  Dictation workstation:   [id number]      Physical Exam  General: NAD, well-appearing, cooperative. no jaundice, pallor, rash, bruises  HEENT: NC/AT, MMM, no oral lesions or pharyngeal erythema. PERRL. no scleral icterus or conjunctival injection. neck soft w/o masses or LAD. superficial scalp lac  well-approximated. no active bleed  CV: RRR, no murmurs, rubs, or gallops. No JVD.  Chest: breathing unlabored. CTAB w/ adequate air-entry. permacath c/d/i  Abd: non-distended. BS+. soft, non-tender. no masses or organomegaly.  Extr: wwp, 2+ and symmetric peripheral pulses. no LE edema.  Neuro: AAOx3. CNII-XII intact. no focal findings.       Relevant Results               Assessment/Plan                  Principal Problem:    Fall, initial encounter    79F hx CAD, HTN, COPD, ESRD on HD via permacath, DM2, MDS, gout, neuropathy presents for fall     #ground level fall, mechanical:  #L parietal scalp laceration/small hematoma s/p staples x2:  slipped on floor. no pre/syncope or new focal deficits. no other injuries. ct head w/o ich. no active scalp bleeding. on asa, no anticoagulants  - ekg, ck, ct head/cspine wnl; f/u cxr, pelvic xr  - pt eval; fall precautions; wound care; needs staples removed in 7d     #ESRD on HD via permacath:   missed hd today 4/22, no urgent needs  - consult renal; sevelamer, vid d     #?hx acute cholecystitis:   unclear dx from last hospitalization. sx improved.  - continue augmentin to finish 10d  - surgery and cards f/u (for pre-op eval)     #CAD: stable; mtp, statin, asa  #HTN: stable; amlodipine, mtp  #COPD: stable; albuterol prn  #DM2: bg wnl. iss; hold outpt meds  #MDS: cell counts stable; outpt heme f/u  #gout: home allopurinol  #neuropathy: home lyrica     #FEN/GI: renal/diabetic  #PPx: heparin sq  #Lines/Tubes/Drains: piv, permacath  #Analgesia/Sedation: tylenol  #Code: full  #Dispo: pending clinical stability  #Contact: tremaine daniel [phone]          Spent 35 minutes in the follow-up management of this patient today.    Naga Carr MD

## 2024-04-23 NOTE — PROGRESS NOTES
Physical Therapy                 Therapy Communication Note    Patient Name: Tana Hargrove  MRN: [id number]  Today's Date: 4/23/2024     Discipline: Physical Therapy    Missed Visit Reason: Missed Visit Reason: Patient placed on medical hold (Patient with significant drop in H&H, hemoglobin 4.8 this am with no blood transfusion yet. Will hold on eval until patient  receives the 2 units as ordered. Recheck later today or tomorrow.)    Missed Time: Attempt    Comment:

## 2024-04-23 NOTE — PROGRESS NOTES
Social work consult placed for positive medical risk screen. SW reviewed pt's chart and communicated with TCC. No SW needs foreseen at this time. SW signing off; available upon request.    GISELLE Magallanes, LSW ([id number])   Care Transitions

## 2024-04-24 ENCOUNTER — APPOINTMENT (OUTPATIENT)
Dept: DIALYSIS | Facility: HOSPITAL | Age: 79
End: 2024-04-24
Payer: MEDICARE

## 2024-04-24 PROBLEM — E11.8 DIABETES MELLITUS WITH COMPLICATION (MULTI): Status: ACTIVE | Noted: 2021-03-05

## 2024-04-24 LAB
ALBUMIN SERPL BCP-MCNC: 3.3 G/DL (ref 3.4–5)
ALP SERPL-CCNC: 104 U/L (ref 33–136)
ALT SERPL W P-5'-P-CCNC: 16 U/L (ref 7–45)
ANION GAP SERPL CALC-SCNC: 11 MMOL/L (ref 10–20)
AST SERPL W P-5'-P-CCNC: 14 U/L (ref 9–39)
ATRIAL RATE: 71 BPM
BILIRUB SERPL-MCNC: 0.6 MG/DL (ref 0–1.2)
BUN SERPL-MCNC: 22 MG/DL (ref 6–23)
CALCIUM SERPL-MCNC: 8.9 MG/DL (ref 8.6–10.3)
CHLORIDE SERPL-SCNC: 102 MMOL/L (ref 98–107)
CO2 SERPL-SCNC: 26 MMOL/L (ref 21–32)
CREAT SERPL-MCNC: 2.16 MG/DL (ref 0.5–1.05)
EGFRCR SERPLBLD CKD-EPI 2021: 23 ML/MIN/1.73M*2
ERYTHROCYTE [DISTWIDTH] IN BLOOD BY AUTOMATED COUNT: 25.2 % (ref 11.5–14.5)
GLUCOSE BLD MANUAL STRIP-MCNC: 115 MG/DL (ref 74–99)
GLUCOSE BLD MANUAL STRIP-MCNC: 211 MG/DL (ref 74–99)
GLUCOSE BLD MANUAL STRIP-MCNC: 93 MG/DL (ref 74–99)
GLUCOSE SERPL-MCNC: 108 MG/DL (ref 74–99)
HCT VFR BLD AUTO: 22 % (ref 36–46)
HGB BLD-MCNC: 7.7 G/DL (ref 12–16)
MCH RBC QN AUTO: 30.6 PG (ref 26–34)
MCHC RBC AUTO-ENTMCNC: 35 G/DL (ref 32–36)
MCV RBC AUTO: 87 FL (ref 80–100)
NRBC BLD-RTO: 0.4 /100 WBCS (ref 0–0)
P AXIS: 38 DEGREES
PLATELET # BLD AUTO: 209 X10*3/UL (ref 150–450)
POTASSIUM SERPL-SCNC: 4 MMOL/L (ref 3.5–5.3)
PR INTERVAL: 154 MS
PROT SERPL-MCNC: 5.4 G/DL (ref 6.4–8.2)
Q ONSET: 249 MS
QRS COUNT: 11 BEATS
QRS DURATION: 130 MS
QT INTERVAL: 450 MS
QTC CALCULATION(BAZETT): 486 MS
QTC FREDERICIA: 474 MS
R AXIS: 5 DEGREES
RBC # BLD AUTO: 2.52 X10*6/UL (ref 4–5.2)
SODIUM SERPL-SCNC: 135 MMOL/L (ref 136–145)
T AXIS: 69 DEGREES
T OFFSET: 474 MS
VENTRICULAR RATE: 70 BPM
WBC # BLD AUTO: 6.8 X10*3/UL (ref 4.4–11.3)

## 2024-04-24 PROCEDURE — 2500000001 HC RX 250 WO HCPCS SELF ADMINISTERED DRUGS (ALT 637 FOR MEDICARE OP): Performed by: STUDENT IN AN ORGANIZED HEALTH CARE EDUCATION/TRAINING PROGRAM

## 2024-04-24 PROCEDURE — 97165 OT EVAL LOW COMPLEX 30 MIN: CPT | Mod: GO

## 2024-04-24 PROCEDURE — 99233 SBSQ HOSP IP/OBS HIGH 50: CPT | Performed by: INTERNAL MEDICINE

## 2024-04-24 PROCEDURE — 2500000004 HC RX 250 GENERAL PHARMACY W/ HCPCS (ALT 636 FOR OP/ED): Performed by: STUDENT IN AN ORGANIZED HEALTH CARE EDUCATION/TRAINING PROGRAM

## 2024-04-24 PROCEDURE — 86902 BLOOD TYPE ANTIGEN DONOR EA: CPT

## 2024-04-24 PROCEDURE — 2500000005 HC RX 250 GENERAL PHARMACY W/O HCPCS: Performed by: STUDENT IN AN ORGANIZED HEALTH CARE EDUCATION/TRAINING PROGRAM

## 2024-04-24 PROCEDURE — 97161 PT EVAL LOW COMPLEX 20 MIN: CPT | Mod: GP | Performed by: PHYSICAL THERAPIST

## 2024-04-24 PROCEDURE — 84075 ASSAY ALKALINE PHOSPHATASE: CPT | Performed by: STUDENT IN AN ORGANIZED HEALTH CARE EDUCATION/TRAINING PROGRAM

## 2024-04-24 PROCEDURE — 82947 ASSAY GLUCOSE BLOOD QUANT: CPT

## 2024-04-24 PROCEDURE — 99221 1ST HOSP IP/OBS SF/LOW 40: CPT | Performed by: SURGERY

## 2024-04-24 PROCEDURE — 85027 COMPLETE CBC AUTOMATED: CPT | Performed by: STUDENT IN AN ORGANIZED HEALTH CARE EDUCATION/TRAINING PROGRAM

## 2024-04-24 PROCEDURE — 36415 COLL VENOUS BLD VENIPUNCTURE: CPT | Performed by: STUDENT IN AN ORGANIZED HEALTH CARE EDUCATION/TRAINING PROGRAM

## 2024-04-24 PROCEDURE — 2500000002 HC RX 250 W HCPCS SELF ADMINISTERED DRUGS (ALT 637 FOR MEDICARE OP, ALT 636 FOR OP/ED): Performed by: STUDENT IN AN ORGANIZED HEALTH CARE EDUCATION/TRAINING PROGRAM

## 2024-04-24 PROCEDURE — 82947 ASSAY GLUCOSE BLOOD QUANT: CPT | Mod: 91

## 2024-04-24 PROCEDURE — 2500000006 HC RX 250 W HCPCS SELF ADMINISTERED DRUGS (ALT 637 FOR ALL PAYERS): Performed by: STUDENT IN AN ORGANIZED HEALTH CARE EDUCATION/TRAINING PROGRAM

## 2024-04-24 PROCEDURE — 1200000002 HC GENERAL ROOM WITH TELEMETRY DAILY

## 2024-04-24 PROCEDURE — 8010000001 HC DIALYSIS - HEMODIALYSIS PER DAY

## 2024-04-24 RX ADMIN — Medication 2000 UNITS: at 08:18

## 2024-04-24 RX ADMIN — AMOXICILLIN AND CLAVULANATE POTASSIUM 1 TABLET: 500; 125 TABLET, FILM COATED ORAL at 08:18

## 2024-04-24 RX ADMIN — PREGABALIN 75 MG: 75 CAPSULE ORAL at 22:06

## 2024-04-24 RX ADMIN — PRAVASTATIN SODIUM 40 MG: 40 TABLET ORAL at 22:06

## 2024-04-24 RX ADMIN — SEVELAMER CARBONATE 800 MG: 800 TABLET, FILM COATED ORAL at 17:32

## 2024-04-24 RX ADMIN — INSULIN LISPRO 2 UNITS: 100 INJECTION, SOLUTION INTRAVENOUS; SUBCUTANEOUS at 12:35

## 2024-04-24 RX ADMIN — METOPROLOL TARTRATE 25 MG: 25 TABLET, FILM COATED ORAL at 22:06

## 2024-04-24 RX ADMIN — ASPIRIN 81 MG CHEWABLE TABLET 81 MG: 81 TABLET CHEWABLE at 08:18

## 2024-04-24 RX ADMIN — SEVELAMER CARBONATE 800 MG: 800 TABLET, FILM COATED ORAL at 12:35

## 2024-04-24 RX ADMIN — SEVELAMER CARBONATE 800 MG: 800 TABLET, FILM COATED ORAL at 08:18

## 2024-04-24 RX ADMIN — METOPROLOL TARTRATE 25 MG: 25 TABLET, FILM COATED ORAL at 08:18

## 2024-04-24 RX ADMIN — ALLOPURINOL 100 MG: 100 TABLET ORAL at 08:18

## 2024-04-24 RX ADMIN — PREGABALIN 75 MG: 75 CAPSULE ORAL at 08:18

## 2024-04-24 RX ADMIN — CYANOCOBALAMIN TAB 1000 MCG 1000 MCG: 1000 TAB at 08:18

## 2024-04-24 RX ADMIN — HEPARIN SODIUM 5000 UNITS: 5000 INJECTION INTRAVENOUS; SUBCUTANEOUS at 22:06

## 2024-04-24 RX ADMIN — AMOXICILLIN AND CLAVULANATE POTASSIUM 1 TABLET: 500; 125 TABLET, FILM COATED ORAL at 22:05

## 2024-04-24 RX ADMIN — AMLODIPINE BESYLATE 5 MG: 5 TABLET ORAL at 08:18

## 2024-04-24 RX ADMIN — BACITRACIN ZINC, NEOMYCIN, POLYMYXIN B 10 APPLICATION: 400; 3.5; 5 OINTMENT TOPICAL at 08:18

## 2024-04-24 ASSESSMENT — COGNITIVE AND FUNCTIONAL STATUS - GENERAL
PERSONAL GROOMING: A LITTLE
CLIMB 3 TO 5 STEPS WITH RAILING: A LOT
HELP NEEDED FOR BATHING: A LITTLE
DAILY ACTIVITIY SCORE: 19
MOVING TO AND FROM BED TO CHAIR: A LITTLE
DRESSING REGULAR LOWER BODY CLOTHING: A LITTLE
DAILY ACTIVITIY SCORE: 18
TURNING FROM BACK TO SIDE WHILE IN FLAT BAD: A LITTLE
MOVING TO AND FROM BED TO CHAIR: A LITTLE
TOILETING: A LITTLE
TOILETING: A LITTLE
DRESSING REGULAR LOWER BODY CLOTHING: A LITTLE
MOBILITY SCORE: 17
STANDING UP FROM CHAIR USING ARMS: A LITTLE
PERSONAL GROOMING: A LITTLE
HELP NEEDED FOR BATHING: A LOT
TURNING FROM BACK TO SIDE WHILE IN FLAT BAD: A LITTLE
DRESSING REGULAR UPPER BODY CLOTHING: A LITTLE
MOVING FROM LYING ON BACK TO SITTING ON SIDE OF FLAT BED WITH BEDRAILS: A LITTLE
STANDING UP FROM CHAIR USING ARMS: A LITTLE
WALKING IN HOSPITAL ROOM: A LITTLE
DRESSING REGULAR UPPER BODY CLOTHING: A LITTLE
MOVING FROM LYING ON BACK TO SITTING ON SIDE OF FLAT BED WITH BEDRAILS: A LITTLE
CLIMB 3 TO 5 STEPS WITH RAILING: A LOT
MOBILITY SCORE: 17
WALKING IN HOSPITAL ROOM: A LITTLE

## 2024-04-24 ASSESSMENT — PAIN SCALES - GENERAL
PAINLEVEL_OUTOF10: 0 - NO PAIN

## 2024-04-24 ASSESSMENT — PAIN - FUNCTIONAL ASSESSMENT
PAIN_FUNCTIONAL_ASSESSMENT: 0-10
PAIN_FUNCTIONAL_ASSESSMENT: 0-10

## 2024-04-24 ASSESSMENT — ENCOUNTER SYMPTOMS
NAUSEA: 0
CHEST TIGHTNESS: 0
SHORTNESS OF BREATH: 0
WOUND: 1
ABDOMINAL PAIN: 0
FEVER: 0
JOINT SWELLING: 0
CHILLS: 0

## 2024-04-24 ASSESSMENT — ACTIVITIES OF DAILY LIVING (ADL)
BATHING_ASSISTANCE: MAXIMAL
ADL_ASSISTANCE: INDEPENDENT

## 2024-04-24 NOTE — PROGRESS NOTES
04/24/24 1039   Patient Choice   Provider Choice list and CMS website (https://medicare.gov/care-compare#search) for post-acute Quality and Resource Measure Data were provided and reviewed with: Patient     I met with pt to discuss PT rec for low intensity.  Pt was agreeable. Freedom of choice list was offered by creating list in [employer] in patient's preferred geographical area and in network with insurance.   She states she had HHC in the recent past with [facility].  She liked them and said she wants to use them again.  Referral sent to [facility] via [employer].      [address]  [facility] had notified me that they are already active with her.  New referral placed for resumption of care and previous referral closed.

## 2024-04-24 NOTE — CARE PLAN
"The patient's goals for the shift include \"For my head to stop bleeding and to sleep.\"    The clinical goals for the shift include Patient will remain hemodynamically stable for the length of my shift.    Over the shift, the patient did make progress toward the following goals.     Problem: Pain - Adult  Goal: Verbalizes/displays adequate comfort level or baseline comfort level  Outcome: Progressing     Problem: Safety - Adult  Goal: Free from fall injury  Outcome: Progressing     Problem: Discharge Planning  Goal: Discharge to home or other facility with appropriate resources  Outcome: Progressing     Problem: Chronic Conditions and Co-morbidities  Goal: Patient's chronic conditions and co-morbidity symptoms are monitored and maintained or improved  Outcome: Progressing     Problem: Diabetes  Goal: Maintain electrolyte levels within acceptable range throughout shift  Outcome: Progressing  Goal: Maintain glucose levels >70mg/dl to <250mg/dl throughout shift  Outcome: Progressing  Goal: No changes in neurological exam by end of shift  Outcome: Progressing  Goal: Learn about and adhere to nutrition recommendations by end of shift  Outcome: Progressing  Goal: Vital signs within normal range for age by end of shift  Outcome: Progressing  Goal: Increase self care and/or family involovement by end of shift  Outcome: Progressing  Goal: Receive DSME education by end of shift  Outcome: Progressing     Problem: Nutrition  Goal: Oral intake greater than 50%  Outcome: Progressing  Goal: Adequate PO fluid intake  Outcome: Progressing  Goal: Nutrition support goals are met within 48 hrs  Outcome: Progressing  Goal: Nutrition support is meeting 75% of nutrient needs  Outcome: Progressing  Goal: BG  mg/dL  Outcome: Progressing  Goal: Lab values WNL  Outcome: Progressing  Goal: Electrolytes WNL  Outcome: Progressing  Goal: Promote healing  Outcome: Progressing  Goal: Maintain stable weight  Outcome: Progressing  Goal: Reduce " weight from edema/fluid  Outcome: Progressing     Problem: Skin  Goal: Decreased wound size/increased tissue granulation at next dressing change  Outcome: Progressing  Goal: Participates in plan/prevention/treatment measures  Outcome: Progressing  Goal: Prevent/manage excess moisture  Outcome: Progressing  Goal: Prevent/minimize sheer/friction injuries  Outcome: Progressing  Goal: Promote/optimize nutrition  Outcome: Progressing  Goal: Promote skin healing  Outcome: Progressing     Problem: Fall/Injury  Goal: Not fall by end of shift  Outcome: Progressing  Goal: Be free from injury by end of the shift  Outcome: Progressing  Goal: Verbalize understanding of personal risk factors for fall in the hospital  Outcome: Progressing  Goal: Verbalize understanding of risk factor reduction measures to prevent injury from fall in the home  Outcome: Progressing  Goal: Use assistive devices by end of the shift  Outcome: Progressing  Goal: Pace activities to prevent fatigue by end of the shift  Outcome: Progressing     Problem: Pain  Goal: Takes deep breaths with improved pain control throughout the shift  Outcome: Progressing  Goal: Turns in bed with improved pain control throughout the shift  Outcome: Progressing  Goal: Walks with improved pain control throughout the shift  Outcome: Progressing  Goal: Performs ADL's with improved pain control throughout shift  Outcome: Progressing  Goal: Participates in PT with improved pain control throughout the shift  Outcome: Progressing  Goal: Free from opioid side effects throughout the shift  Outcome: Progressing  Goal: Free from acute confusion related to pain meds throughout the shift  Outcome: Progressing

## 2024-04-24 NOTE — PROGRESS NOTES
Physical Therapy    Physical Therapy Evaluation    Patient Name: Tana Hargrove  MRN: [id number]  Today's Date: 4/24/2024   Time Calculation  Start Time: 0909  Stop Time: 0923  Time Calculation (min): 14 min    Assessment/Plan   PT Assessment  PT Assessment Results: Decreased endurance, Decreased mobility, Impaired balance  Rehab Prognosis: Good  Barriers to Discharge: none  Evaluation/Treatment Tolerance: Patient tolerated treatment well  Medical Staff Made Aware: Yes  End of Session Communication: PCT/NA/CTA  Assessment Comment: Patient requires only slight assist prn for mobility. Anticipate good improvement over course of admit.  End of Session Patient Position: Up in chair, Alarm on  IP OR SWING BED PT PLAN  Inpatient or Swing Bed: Inpatient  PT Plan  Treatment/Interventions: Bed mobility, Transfer training, Gait training, Balance training, Strengthening, Endurance training, Therapeutic exercise, Therapeutic activity, Home exercise program  PT Plan: Skilled PT  PT Frequency: 3 times per week  PT Discharge Recommendations: Low intensity level of continued care (May benefit from additional family support at home prn at least initially.)  PT - OK to Discharge: Yes (when medically cleared)      General Visit Information:  General  Reason for Referral: Dx:Fall, Syncope?, L scalp hematoma and laceration  Referred By: Steven  Past Medical History Relevant to Rehab: CAD, HTN, COPD, ESRD on HD,, DM,, MDS, neuropathy, multiple recent admits  Patient Position Received: Bed, 3 rail up, Alarm on  General Comment: Seen in room 3308, external cardona, tele    Home Living:  Home Living  Type of Home:  (Lives alone in 1 level apt, independent with ADLs, drives, amb with rollator. Daughter stops in on Saturdays to assist with chores/check in.)    Prior Level of Function:       Precautions:  Precautions  Precautions Comment: falls    Vital Signs:     Objective     Pain:  Pain Assessment  Pain Score: 0 - No  pain    Cognition:  Cognition  Overall Cognitive Status: Within Functional Limits    General Assessments:  General Observation  General Observation: Cues given prn to keep AD closer during amb   Activity Tolerance  Endurance: Endurance does not limit participation in activity     Strength  Strength Comments: DIGNA LE quads grossly 4+/5        Postural Control  Postural Control:  (sitting balance fair+; standing balance fair with AD)          Functional Assessments:     Bed Mobility  Bed Mobility:  (supine to sit with Min assist x 1; cues for sequencing, safety, balance, wt shiftig)  Transfers  Transfer:  (sit to stand with CGA x1 and FWW; cues for sequencing, safety, balance, posture, wt shifting, AD use)  Ambulation/Gait Training  Ambulation/Gait Training Performed:  (Amb 28 + 22 feet with CGA x1 and FWW; cues for sequencing, safety, balance, posture, wt shifting, AD use)          Extremity/Trunk Assessments:                Outcome Measures:  [facility] Basic Mobility  Turning from your back to your side while in a flat bed without using bedrails: A little  Moving from lying on your back to sitting on the side of a flat bed without using bedrails: A little  Moving to and from bed to chair (including a wheelchair): A little  Standing up from a chair using your arms (e.g. wheelchair or bedside chair): A little  To walk in hospital room: A little  Climbing 3-5 steps with railing: A lot  Basic Mobility - Total Score: 17                            Goals:  Encounter Problems       Encounter Problems (Active)       PT Problem       transfers       Start:  04/24/24    Expected End:  05/08/24       Patient will perform all transfers with SBA x1          gait       Start:  04/24/24    Expected End:  05/08/24       Patient will amb 100+ feet with SBA x1          strengthening        Start:  04/24/24    Expected End:  05/08/24       Patient will perform 20+ reps of AROM/RROM for DIGNA LE's to improve safety and functional independence               Pain - Adult            Education Documentation  Precautions, taught by Maritza Perez, PT at 4/24/2024 10:24 AM.  Learner: Patient  Readiness: Acceptance  Method: Explanation  Response: Verbalizes Understanding    Mobility Training, taught by Maritza Perez, PT at 4/24/2024 10:24 AM.  Learner: Patient  Readiness: Acceptance  Method: Explanation  Response: Verbalizes Understanding    Education Comments  No comments found.

## 2024-04-24 NOTE — NURSING NOTE
2027: Transfusion completed with VS documented and WNL. Patient denies any feelings of discomfort, no signs of SOB, or dyspnea.

## 2024-04-24 NOTE — CONSULTS
GENERAL SURGERY CONSULTATION NOTE    Tana Hargrove   1945   [id number]     Inpatient consult to Acute Care Surgery  Consult performed by: Lilian Edwards DO  Consult ordered by: Naga Carr MD      Reason For Consult  Fall 2 days ago, scalp laceration with ongoing bleeding    History Of Present Illness  Tana Hargrove is a 79 y.o. female with history of CAD, HTN, COPD, ESRD on HD, DM2, MDS, gout, neuropathy who presented to the hospital following a mechanical fall 2 days ago at which she sustained a posterior scalp laceration. Initially the laceration was felt to not need repair, however it had some recurrence of bleeding and had staples placed in the ED. Overnight, patient had some recurrent bleeding and additional staples were placed. Additionally patient had a drop in her hemoglobin to 4.8, received 2U pRBCs with recheck at 7.8. Patient currently getting dialysis. Reports she had significant bleeding initially after the fall and while here but has not had any issues since the additional staples were placed. Denies any pains anywhere else. Denies chest pain, shortness of breath, abdominal pain, nausea, or vomiting.      Past Medical History  Past Medical History:   Diagnosis Date    Abnormal levels of other serum enzymes     Elevated liver enzymes    Acute kidney failure (CMS-HCC)     Anemia     CKD (chronic kidney disease)     COPD (chronic obstructive pulmonary disease) (Multi)     Coronary artery disease     Disease of blood and blood-forming organs, unspecified     Bone marrow disorder    HLD (hyperlipidemia)     Hypertension     MDS (myelodysplastic syndrome) (Multi)     Personal history of other diseases of the musculoskeletal system and connective tissue     History of muscle pain    Personal history of other specified conditions     History of insomnia    Personal history of other specified conditions     History of edema    Type 2 diabetes mellitus (Multi)        Surgical History  Past  Surgical History:   Procedure Laterality Date    APPENDECTOMY      BREAST BIOPSY Left     left excisional    BREAST LUMPECTOMY      COLONOSCOPY      HYSTERECTOMY      JOINT REPLACEMENT      MR HEAD ANGIO WO IV CONTRAST  2020    MR HEAD ANGIO WO IV CONTRAST 2020 [facility] CLINICAL LEGACY    MR NECK ANGIO WO IV CONTRAST  2020    MR NECK ANGIO WO IV CONTRAST 2020 [facility] CLINICAL LEGACY    OOPHORECTOMY      OTHER SURGICAL HISTORY  2019    Oophorectomy bilateral    OTHER SURGICAL HISTORY  2019    Tubal ligation    OTHER SURGICAL HISTORY Bilateral 2019    Knee replacement    OTHER SURGICAL HISTORY Left 2019    Shoulder surgery    OTHER SURGICAL HISTORY  2019    Hysterectomy    OTHER SURGICAL HISTORY  2019    Lumpectomy    OTHER SURGICAL HISTORY Right 2019    Foot surgery    OTHER SURGICAL HISTORY  2021    Back surgery       Medications  No current facility-administered medications on file prior to encounter.     Current Outpatient Medications on File Prior to Encounter   Medication Sig Dispense Refill    allopurinol (Zyloprim) 100 mg tablet Take 1 tablet (100 mg) by mouth once daily. 90 tablet 1    amLODIPine (Norvasc) 5 mg tablet Take 1 tablet (5 mg) by mouth once daily.      amoxicillin-pot clavulanate (Augmentin) 875-125 mg tablet Take 1 tablet by mouth 2 times a day for 10 days. 20 tablet 0    [] cefdinir (Omnicef) 300 mg capsule Take 1 capsule (300 mg) by mouth 2 times a day for 4 days. 8 capsule 0    cholecalciferol (Vitamin D-3) 50 MCG (2000 UT) tablet Take 1 tablet (2,000 Units) by mouth once daily.      cyanocobalamin (Vitamin B-12) 1,000 mcg tablet Take 1 tablet (1,000 mcg) by mouth once daily.      metoprolol tartrate (Lopressor) 25 mg tablet Take 1 tablet (25 mg) by mouth 2 times a day. 60 tablet 3    pioglitazone (Actos) 15 mg tablet Take 1 tablet (15 mg) by mouth once daily. 30 tablet 3    pravastatin (Pravachol) 40 mg tablet Take 1  "tablet (40 mg) by mouth once daily at bedtime. 90 tablet 1    pregabalin (Lyrica) 100 mg capsule TAKE ONE CAPSULE BY MOUTH TWICE DAILY @9AM & 5PM 60 capsule 0    sevelamer carbonate (Renvela) 800 mg tablet Take 1 tablet (800 mg) by mouth 3 times a day with meals. Swallow tablet whole; do not crush, break, or chew. 30 tablet 1    SITagliptin phosphate (Januvia) 25 mg tablet Take 1 tablet (25 mg) by mouth once daily. 30 tablet 3       Allergies  Codeine, Hydrocodone, Hydrocodone-acetaminophen, Meperidine (pf), Tramadol, Piperacillin-tazobactam, Adhesive tape-silicones, and Meperidine     Social History  She reports that she has never smoked. She has never used smokeless tobacco. She reports that she does not currently use alcohol. She reports that she does not use drugs.    Family History  No family history on file.     Review of Systems   Constitutional:  Negative for chills and fever.   Respiratory:  Negative for chest tightness and shortness of breath.    Cardiovascular:  Negative for chest pain and leg swelling.   Gastrointestinal:  Negative for abdominal pain and nausea.   Musculoskeletal:  Negative for joint swelling.   Skin:  Positive for wound.   Neurological:  Negative for syncope.       Last Recorded Vitals  Blood pressure 139/65, pulse 61, temperature 36.5 °C (97.7 °F), resp. rate 18, height 1.549 m (5' 1\"), weight 68 kg (150 lb), SpO2 96%.     Physical Exam  Constitutional:       General: She is not in acute distress.  HENT:      Head: Normocephalic.      Comments: Posterior scalp laceration well approximated with staples in place. No active bleeding or oozing, some surrounding ecchymosis.   Eyes:      Extraocular Movements: Extraocular movements intact.      Conjunctiva/sclera: Conjunctivae normal.   Cardiovascular:      Rate and Rhythm: Normal rate and regular rhythm.      Pulses: Normal pulses.   Pulmonary:      Effort: Pulmonary effort is normal. No respiratory distress.   Abdominal:      General: " There is no distension.      Palpations: Abdomen is soft.      Tenderness: There is no abdominal tenderness.   Musculoskeletal:         General: No swelling. Normal range of motion.      Cervical back: Neck supple.   Skin:     General: Skin is warm and dry.   Neurological:      General: No focal deficit present.      Mental Status: She is alert and oriented to person, place, and time.          Relevant Results:  Labs:  Results for orders placed or performed during the hospital encounter of 04/22/24 (from the past 24 hour(s))   POCT GLUCOSE   Result Value Ref Range    POCT Glucose 135 (H) 74 - 99 mg/dL   Hemoglobin and hematocrit, blood   Result Value Ref Range    Hemoglobin 7.8 (L) 12.0 - 16.0 g/dL    Hematocrit 22.7 (L) 36.0 - 46.0 %   POCT GLUCOSE   Result Value Ref Range    POCT Glucose 103 (H) 74 - 99 mg/dL   CBC   Result Value Ref Range    WBC 6.8 4.4 - 11.3 x10*3/uL    nRBC 0.4 (H) 0.0 - 0.0 /100 WBCs    RBC 2.52 (L) 4.00 - 5.20 x10*6/uL    Hemoglobin 7.7 (L) 12.0 - 16.0 g/dL    Hematocrit 22.0 (L) 36.0 - 46.0 %    MCV 87 80 - 100 fL    MCH 30.6 26.0 - 34.0 pg    MCHC 35.0 32.0 - 36.0 g/dL    RDW 25.2 (H) 11.5 - 14.5 %    Platelets 209 150 - 450 x10*3/uL   Comprehensive metabolic panel   Result Value Ref Range    Glucose 108 (H) 74 - 99 mg/dL    Sodium 135 (L) 136 - 145 mmol/L    Potassium 4.0 3.5 - 5.3 mmol/L    Chloride 102 98 - 107 mmol/L    Bicarbonate 26 21 - 32 mmol/L    Anion Gap 11 10 - 20 mmol/L    Urea Nitrogen 22 6 - 23 mg/dL    Creatinine 2.16 (H) 0.50 - 1.05 mg/dL    eGFR 23 (L) >60 mL/min/1.73m*2    Calcium 8.9 8.6 - 10.3 mg/dL    Albumin 3.3 (L) 3.4 - 5.0 g/dL    Alkaline Phosphatase 104 33 - 136 U/L    Total Protein 5.4 (L) 6.4 - 8.2 g/dL    AST 14 9 - 39 U/L    Bilirubin, Total 0.6 0.0 - 1.2 mg/dL    ALT 16 7 - 45 U/L   POCT GLUCOSE   Result Value Ref Range    POCT Glucose 115 (H) 74 - 99 mg/dL   POCT GLUCOSE   Result Value Ref Range    POCT Glucose 211 (H) 74 - 99 mg/dL     *Note: Due to a  large number of results and/or encounters for the requested time period, some results have not been displayed. A complete set of results can be found in Results Review.       Imaging:  ECG 12 lead  Sinus rhythm  Nonspecific intraventricular conduction delay  Nonspecific T abnrm, anterolateral leads  Artifact in lead(s) I,II,III,aVR,aVL,aVF,V1,V2  XR chest 1 view  Narrative: Interpreted By:  Rashida Sanchez,   STUDY:  XR CHEST 1 VIEW;  4/23/2024 6:13 am      INDICATION:  Signs/Symptoms:fall elderly      COMPARISON:  Chest x-ray 04/13/2024.      ACCESSION NUMBER(S):  [id number]      ORDERING CLINICIAN:  FOREIGN CHARLTON      TECHNIQUE:  Portable upright frontal view of the chest was obtained .      FINDINGS:  There is a right-sided dual-lumen central catheter with the tip  overlying the region of the right atrium.      The cardiomediastinal silhouette is within normal limits.      No focal consolidation, pleural effusion or pneumothorax.      There is elevation of the right hemidiaphragm.      Impression: 1.  No radiographic evidence of acute cardiopulmonary process.              MACRO:  None.      Signed by: Rashida Sanchez 4/23/2024 6:44 AM  Dictation workstation:   [id number]  XR pelvis 1-2 views  Narrative: Interpreted By:  Rashida Sanchez,   STUDY:  XR PELVIS 1-2 VIEWS;  4/23/2024 6:13 am      INDICATION:  Signs/Symptoms:fall elderly.      COMPARISON:  Right hip x-ray 08/11/2023.      ACCESSION NUMBER(S):  [id number]      ORDERING CLINICIAN:  FOREIGN CHARLTON      FINDINGS:  1 AP supine portable view of the pelvis was obtained.      There is no acute fracture or dislocation. The pelvic ring appears  intact. The bilateral hip joints are maintained. Partially visualized  orthopedic hardware is transfixing the proximal right femur.  Partially visualized orthopedic hardware at the lower lumbar spine.  Vascular calcifications are noted. The soft tissues are unremarkable.      Impression: 1.  No radiographic  evidence for acute fracture.              MACRO:  None.      Signed by: Rashida Sanchez 4/23/2024 6:40 AM  Dictation workstation:   [id number]      Assessment and Plan  Principal Problem:    Fall, initial encounter    79 y.o. female with history of CAD, HTN, COPD, ESRD on HD, DM2, MDS, gout, neuropathy who presented following a mechanical fall and sustaining a posterior scalp laceration that had some ongoing bleeding requiring transfusion. Laceration appears hemostatic, no additional intervention indicated, continue to monitor.     Plan:  -No surgical intervention indicated, wound appears hemostatic, continue to monitor   -Hx concern for acute cholecystitis during last admission, continue Augmentin to finish 10 day course (until 5/2). Patient is planned to follow up with Dr. Grimes outpatient    Discussed with attending Dr. Kenyetta Edwards DO - PGY3  General Surgery

## 2024-04-24 NOTE — PROGRESS NOTES
"      Nephrology Progress Note      Nephrology following for ESRD.   Events over night:     Status post P RBCs 3 units yesterday  No shortness of breath      /65 (BP Location: Left arm, Patient Position: Lying)   Pulse 55   Temp 36.4 °C (97.6 °F) (Temporal)   Resp 18   Ht 1.549 m (5' 1\")   Wt 68 kg (150 lb)   SpO2 96%   BMI 28.34 kg/m²     Input / Output:  24 HR:   Intake/Output Summary (Last 24 hours) at 4/24/2024 1233  Last data filed at 4/24/2024 0612  Gross per 24 hour   Intake 1384.25 ml   Output 1725 ml   Net -340.75 ml       Physical Exam   Alert and oriented x 3 NAD  Neck: no JVD  CV: RRR  Lungs: CTA bilaterally  Abd: soft, NT, ND   Ext: no lower extremity edema    Scheduled medications  allopurinol, 100 mg, oral, Daily  amLODIPine, 5 mg, oral, Daily  amoxicillin-pot clavulanate, 1 tablet, oral, BID  aspirin, 81 mg, oral, Daily  cholecalciferol, 2,000 Units, oral, Daily  cyanocobalamin, 1,000 mcg, oral, Daily  heparin (porcine), 5,000 Units, subcutaneous, q12h  insulin lispro, 0-5 Units, subcutaneous, TID with meals  metoprolol tartrate, 25 mg, oral, BID  neomycin-bacitracnZn-polymyxnB, , Topical, Daily  pravastatin, 40 mg, oral, Nightly  pregabalin, 75 mg, oral, BID  sevelamer carbonate, 800 mg, oral, TID with meals      Continuous medications     PRN medications  PRN medications: acetaminophen, dextrose, dextrose, glucagon, glucagon, ipratropium-albuteroL, melatonin, ondansetron **OR** ondansetron, polyethylene glycol   Results from last 7 days   Lab Units 04/24/24  0454   SODIUM mmol/L 135*   POTASSIUM mmol/L 4.0   CHLORIDE mmol/L 102   CO2 mmol/L 26   BUN mg/dL 22   CREATININE mg/dL 2.16*   CALCIUM mg/dL 8.9   PROTEIN TOTAL g/dL 5.4*   BILIRUBIN TOTAL mg/dL 0.6   ALK PHOS U/L 104   ALT U/L 16   AST U/L 14   GLUCOSE mg/dL 108*      Results from last 7 days   Lab Units 04/22/24  1601 04/19/24  0526   MAGNESIUM mg/dL 1.81 2.00      Results from last 7 days   Lab Units 04/24/24  0454 " 04/23/24  2151 04/23/24  0444 04/22/24  1601   WBC AUTO x10*3/uL 6.8  --  5.9 13.7*   HEMOGLOBIN g/dL 7.7* 7.8* 4.8* 7.0*   HEMATOCRIT % 22.0* 22.7* 15.3* 21.8*   PLATELETS AUTO x10*3/uL 209  --  235 322        Assessment & Plan:     Patient is 79 y.o. female who is admitted to hospital for fall with acute blood loss anemia after scalp laceration. Nephrology consulted in view of ESRD.      ESRD- HD MWF[facility]         Anemia acute on chronic with hx of MDS-   Has been getting Micera and iron at her HD unit. 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     Pinky Christie DO  4/24/2024  12:33 PM     [facility]    [address], [address]   [place] [address]  Office: [phone]

## 2024-04-24 NOTE — POST-PROCEDURE NOTE
Report to Receiving RN:    Report To: ivan love   Time Report Called: 1653  Hand-Off Communication: stable 0 ml net removed   Complications During Treatment: No  Ultrafiltration Treatment: No  Medications Administered During Dialysis: No  Blood Products Administered During Dialysis: No  Labs Sent During Dialysis: No  Heparin Drip Rate Changes: N/A  Dialysis Catheter Dressing: clean dry intact  Last Dressing Change: 4/24/24    Electronic Signatures:  Ashutosh Jacome RN  (Signed )   Authored:    (Signed )   Authored:     Last Updated: 5:16 PM by ASHUTOSH JACOME

## 2024-04-24 NOTE — NURSING NOTE
Patient has no bleeding since staples were placed by Dr. Vo earlier this shift. Patient refusing dressing being placed at this time.

## 2024-04-24 NOTE — PROGRESS NOTES
Occupational Therapy    Evaluation    Patient Name: Tana Hargrove  MRN: [id number]  Today's Date: 4/24/2024  Time Calculation  Start Time: 0908  Stop Time: 0923  Time Calculation (min): 15 min    Assessment  IP OT Assessment  OT Assessment: OT eval completed. The patient is functioning below baseline for ADLs and mobility. can benefit from continued OT  End of Session Communication: Bedside nurse  End of Session Patient Position: Up in chair, Alarm on    Plan:  Treatment Interventions: ADL retraining, Functional transfer training, UE strengthening/ROM, Endurance training, Cognitive reorientation, Equipment evaluation/education, Patient/family training, Neuromuscular reeducation  OT Frequency: 3 times per week  OT Discharge Recommendations: Low intensity level of continued care  OT - OK to Discharge: Yes To next level of care, with consideration of recommendations established on OT eval, and once cleared by medical team/physician for DC.     Subjective     Current Problem:  1. Fall, initial encounter  Referral to [facility]      2. Closed head injury, initial encounter  Referral to [facility]      3. ESTEE (acute kidney injury) (CMS-HCC)        4. Fracture of bone of hip (Multi)  Referral to [facility]      5. Articular cartilage disorder of hip, unspecified laterality  Referral to [facility]      6. ESRD (end stage renal disease) on dialysis (Multi)  Referral to [facility]      7. Repeated falls  Referral to [facility]      8. Primary osteoarthritis of right knee  Referral to [facility]      9. Frequent falls  Referral to [facility]          General:  General  Reason for Referral: ADLs, safety assessment  Referred By: Bruno  Past Medical History Relevant to Rehab: multiple admissions to this hospital over the course of a few months. pt with hx abdominal pain. Now in with fall at home. laid on kitchen floor for some time before she could call for help, get up.  scalp laceration with significant bleeding.   Past Medical History:   Diagnosis Date    Abnormal levels of other serum enzymes     Elevated liver enzymes    Acute kidney failure (CMS-HCC)     Anemia     CKD (chronic kidney disease)     COPD (chronic obstructive pulmonary disease) (Multi)     Coronary artery disease     Disease of blood and blood-forming organs, unspecified     Bone marrow disorder    HLD (hyperlipidemia)     Hypertension     MDS (myelodysplastic syndrome) (Multi)     Personal history of other diseases of the musculoskeletal system and connective tissue     History of muscle pain    Personal history of other specified conditions     History of insomnia    Personal history of other specified conditions     History of edema    Type 2 diabetes mellitus (Multi)       Co-Treatment: PT  Co-Treatment Reason: to optimize safety and mobility, while focusing on discipline specific goals  Patient Position Received: Bed, 3 rail up, Alarm on  General Comment: pt alert and agreeable to therapy    Precautions:  Medical Precautions: Fall precautions        Pain:  Pain Assessment  Pain Assessment: 0-10  Pain Score: 0 - No pain    Objective     Cognition:  Overall Cognitive Status: Within Functional Limits  Orientation Level:  (orinted to personm place, time, situation)             Home Living:  Type of Home: Apartment  Lives With: Alone  Home Adaptive Equipment:  (rollator)  Home Layout: One level  Home Access: Level entry     Prior Function:  Receives Help From: Family  ADL Assistance: Independent  Homemaking Assistance: Independent  Ambulatory Assistance: Independent  Prior Function Comments: +drives. daughter checks and visits pt on weekends and can/will assist with IADLs as needed.        ADL:  Eating Assistance: Independent (anticipated)  Grooming Assistance: Stand by  Grooming Deficit: Wash/dry hands, Standing with assistive device  Bathing Assistance: Maximal (anticipated)  UE Dressing Assistance: Minimal  (anticipated)  LE Dressing Assistance: Minimal (anticipated)  Toileting Assistance with Device: Minimal  Toileting Deficit: Clothing management up, Clothing management down, Perineal hygiene, Increased time to complete, Supervison/safety, Steadying  ADL Comments: pt performed toileting and hand hygiene at bathroom level. some steadying and assistance needed to pull down and up pants.    Activity Tolerance:  Endurance: Endurance does not limit participation in activity    Bed Mobility/Transfers:   Bed Mobility  Bed Mobility: Yes  Bed Mobility 1  Bed Mobility 1: Supine to sitting  Level of Assistance 1: Minimum assistance  Bed Mobility Comments 1: x1 person  Transfers  Transfer: Yes  Transfer 1  Transfer From 1: Sit to  Transfer to 1: Stand  Technique 1: Stand to sit  Transfer Device 1: Walker  Transfer Level of Assistance 1: Contact guard  Transfers 2  Transfer From 2: Stand to  Transfer to 2: Toilet  Technique 2:  (toilet to standing)  Transfer Device 2: Walker  Transfer Level of Assistance 2: Minimum assistance (x1)    Ambulation/Gait Training:  Functional Mobility  Functional Mobility Performed:  (pt performed functional mobility bedside to window to bathroom to chair with CGAx1 FWW. mod VC for sequencing and safety)    Sitting Balance:  Static Sitting Balance  Static Sitting-Level of Assistance: Close supervision  Dynamic Sitting Balance  Dynamic Sitting-Comments: supervision    Standing Balance:  Static Standing Balance  Static Standing-Level of Assistance: Contact guard  Dynamic Standing Balance  Dynamic Standing-Comments: CGa    Vision: Vision - Basic Assessment  Current Vision: No visual deficits   and      Sensation:  Light Touch: No apparent deficits    Strength:  Strength Comments: BUE grossly 4-/5    Perception:  Inattention/Neglect: Appears intact    Coordination:  Movements are Fluid and Coordinated: Yes     Hand Function:  Hand Function  Gross Grasp: Functional    Extremities: RUE   RUE : Within  Functional Limits and LUE   LUE: Within Functional Limits    Outcome Measures: [facility] Daily Activity  Putting on and taking off regular lower body clothing: A little  Bathing (including washing, rinsing, drying): A lot  Putting on and taking off regular upper body clothing: A little  Toileting, which includes using toilet, bedpan or urinal: A little  Taking care of personal grooming such as brushing teeth: A little  Eating Meals: None  Daily Activity - Total Score: 18                    EDUCATION:     Education Documentation  ADL Training, taught by Ana Milner OT at 4/24/2024 11:45 AM.  Learner: Patient  Readiness: Acceptance  Method: Explanation  Response: Needs Reinforcement    Education Comments  No comments found.        Goals:   Encounter Problems       Encounter Problems (Active)       ADLs       Patient with complete lower body dressing with modified independent level of assistance donning and doffing all LE clothes  with PRN adaptive equipment while supported sitting and standing (Progressing)       Start:  04/24/24    Expected End:  05/08/24               BALANCE       Patient will tolerate standing for 10 minutes to modified independent level of assistance with least restrictive device in order to improve functional activity tolerance for ADL tasks. (Progressing)       Start:  04/24/24    Expected End:  05/08/24               EXERCISE/STRENGTHENING       Patient with increase BUE to >4+/5 MMT strength. (Progressing)       Start:  04/24/24    Expected End:  05/08/24               VISION       Patient will navigate environments with high visual stimuli safely Without loss of balance and Attending to obstacles with modified independent level of assistance. (Progressing)       Start:  04/24/24    Expected End:  05/08/24

## 2024-04-24 NOTE — PROGRESS NOTES
Tana Hargrove is a 79 y.o. female on day 2 of admission presenting with Fall, initial encounter.      Subjective   79F hx CAD, HTN, COPD, ESRD on HD via permacath, DM2, MDS, gout, neuropathy presents for fall     Patient thinks that she slipped on the floor this afternoon and fell backwards because of that but unsure. thinks knees may have given out as well. felt well after her hospitalization w/o abd pain, n/v, generalized or focal weakness, f/c. has been eating ok. denies dizziness or syncope/loss of consciousness. fell back and hit her buttocks and head. head bled for awhile, now self-resolved w/o sutures. no pain in buttocks/back. no other injuries. not able to get up on own until got help 4h later. no recent cp, sob, diarrhea, dysuria, ha, blurry vision, numbness. taking asa.     4/23: Patient was seen and examined.  She is awake and alert and interactive today.  Multiple changes of bandage dressing due to soaked with blood.  Being transfused with 1 unit of packed red blood cells and 1 unit coming up later today.  Will repeat H&H likely transfuse with 10 units.  Patient completed hemodialysis later today.  PT OT to evaluate for functional status.  Repeat H&H at 6 PM.  Repeat CBC and BMP.  4/24: Patient was seen and examined.  She had an episode of continued bleeding overnight and required more stable on the scalp in order to achieve hemostasis.  I will consult general/trauma surgery to evaluate scalp staples.  Trend CBC and transfuse with hemoglobin less than 7.  Seen by PT OT who recommend home with home health.  Potential discharge within the next 24 to 48 hours        Objective     Last Recorded Vitals  /65 (BP Location: Left arm, Patient Position: Lying)   Pulse 55   Temp 36.4 °C (97.6 °F) (Temporal)   Resp 18   Wt 68 kg (150 lb)   SpO2 96%   Intake/Output last 3 Shifts:    Intake/Output Summary (Last 24 hours) at 4/24/2024 1305  Last data filed at 4/24/2024 0612  Gross per 24 hour   Intake  1384.25 ml   Output 1725 ml   Net -340.75 ml       Admission Weight  Weight: 68 kg (150 lb) (04/22/24 1443)    Daily Weight  04/22/24 : 68 kg (150 lb)    Image Results  ECG 12 lead  Sinus rhythm  Nonspecific intraventricular conduction delay  Nonspecific T abnrm, anterolateral leads  Artifact in lead(s) I,II,III,aVR,aVL,aVF,V1,V2  XR chest 1 view  Narrative: Interpreted By:  Rashida Sanchez,   STUDY:  XR CHEST 1 VIEW;  4/23/2024 6:13 am      INDICATION:  Signs/Symptoms:fall elderly      COMPARISON:  Chest x-ray 04/13/2024.      ACCESSION NUMBER(S):  [id number]      ORDERING CLINICIAN:  FOREIGN CHARLTON      TECHNIQUE:  Portable upright frontal view of the chest was obtained .      FINDINGS:  There is a right-sided dual-lumen central catheter with the tip  overlying the region of the right atrium.      The cardiomediastinal silhouette is within normal limits.      No focal consolidation, pleural effusion or pneumothorax.      There is elevation of the right hemidiaphragm.      Impression: 1.  No radiographic evidence of acute cardiopulmonary process.              MACRO:  None.      Signed by: Rashida Sanchez 4/23/2024 6:44 AM  Dictation workstation:   [id number]  XR pelvis 1-2 views  Narrative: Interpreted By:  Rashida Sanchez,   STUDY:  XR PELVIS 1-2 VIEWS;  4/23/2024 6:13 am      INDICATION:  Signs/Symptoms:fall elderly.      COMPARISON:  Right hip x-ray 08/11/2023.      ACCESSION NUMBER(S):  [id number]      ORDERING CLINICIAN:  FOREIGN CHARLTON      FINDINGS:  1 AP supine portable view of the pelvis was obtained.      There is no acute fracture or dislocation. The pelvic ring appears  intact. The bilateral hip joints are maintained. Partially visualized  orthopedic hardware is transfixing the proximal right femur.  Partially visualized orthopedic hardware at the lower lumbar spine.  Vascular calcifications are noted. The soft tissues are unremarkable.      Impression: 1.  No radiographic evidence for acute  fracture.              MACRO:  None.      Signed by: Rashida Sanchez 4/23/2024 6:40 AM  Dictation workstation:   [id number]      Physical Exam  General: NAD, well-appearing, cooperative. no jaundice, pallor, rash, bruises  HEENT: NC/AT, MMM, no oral lesions or pharyngeal erythema. PERRL. no scleral icterus or conjunctival injection. neck soft w/o masses or LAD. superficial scalp lac well-approximated. no active bleed  CV: RRR, no murmurs, rubs, or gallops. No JVD.  Chest: breathing unlabored. CTAB w/ adequate air-entry. permacath c/d/i  Abd: non-distended. BS+. soft, non-tender. no masses or organomegaly.  Extr: wwp, 2+ and symmetric peripheral pulses. no LE edema.  Neuro: AAOx3. CNII-XII intact. no focal findings.       Relevant Results               Assessment/Plan                  Principal Problem:    Fall, initial encounter    79F hx CAD, HTN, COPD, ESRD on HD via permacath, DM2, MDS, gout, neuropathy presents for fall     #ground level fall, mechanical:  #L parietal scalp laceration/small hematoma s/p staples x2:  slipped on floor. no pre/syncope or new focal deficits. no other injuries. ct head w/o ich. no active scalp bleeding. on asa, no anticoagulants  - ekg, ck, ct head/cspine wnl; f/u cxr, pelvic xr  - pt eval; fall precautions; wound care; needs staples removed in 7d     #ESRD on HD via permacath:   missed hd today 4/22, no urgent needs  - consult renal; sevelamer, vid d     #?hx acute cholecystitis:   unclear dx from last hospitalization. sx improved.  - continue augmentin to finish 10d  - surgery and cards f/u (for pre-op eval)     #CAD: stable; mtp, statin, asa  #HTN: stable; amlodipine, mtp  #COPD: stable; albuterol prn  #DM2: bg wnl. iss; hold outpt meds  #MDS: cell counts stable; outpt heme f/u  #gout: home allopurinol  #neuropathy: home lyrica     #FEN/GI: renal/diabetic  #PPx: heparin sq  #Lines/Tubes/Drains: piv, permacath  #Analgesia/Sedation: tylenol  #Code: full  #Dispo: pending clinical  stability  #Contact: tremaine daniel [phone]          Spent 35 minutes in the follow-up management of this patient today.    Naga Carr MD

## 2024-04-24 NOTE — SIGNIFICANT EVENT
HPI:  79F hx CAD, HTN, COPD, ESRD on HD via permacath, DM2, MDS, gout, neuropathy presents for fall     patient thinks that she slipped on the floor this afternoon and fell backwards because of that but unsure. thinks knees may have given out as well. felt well after her hospitalization w/o abd pain, n/v, generalized or focal weakness, f/c. has been eating ok. denies dizziness or syncope/loss of consciousness. fell back and hit her buttocks and head. head bled for awhile, now self-resolved w/o sutures. no pain in buttocks/back. no other injuries. not able to get up on own until got help 4h later. no recent cp, sob, diarrhea, dysuria, ha, blurry vision, numbness. taking asa.     Event:  Called to floor to examine pt. Pt has apparently been bleeding out of her laceration on her scalp all day and night so far. Examined area and noticed mostly dried blood on vertex of scalp. Used electric razor to shave area including hair and dried blood. Laceration showed two staples with surrounding area showing active bleeding. Used stapler to put another staple on either side of the laceration. Will put pressure with TXA pad and redress area.

## 2024-04-24 NOTE — CARE PLAN
Problem: Pain - Adult  Goal: Verbalizes/displays adequate comfort level or baseline comfort level  Outcome: Progressing     Problem: Safety - Adult  Goal: Free from fall injury  Outcome: Progressing     Problem: Discharge Planning  Goal: Discharge to home or other facility with appropriate resources  Outcome: Progressing     Problem: Chronic Conditions and Co-morbidities  Goal: Patient's chronic conditions and co-morbidity symptoms are monitored and maintained or improved  Outcome: Progressing     Problem: Diabetes  Goal: Maintain electrolyte levels within acceptable range throughout shift  Outcome: Progressing  Goal: Maintain glucose levels >70mg/dl to <250mg/dl throughout shift  Outcome: Progressing  Goal: No changes in neurological exam by end of shift  Outcome: Progressing  Goal: Learn about and adhere to nutrition recommendations by end of shift  Outcome: Progressing  Goal: Vital signs within normal range for age by end of shift  Outcome: Progressing  Goal: Increase self care and/or family involovement by end of shift  Outcome: Progressing  Goal: Receive DSME education by end of shift  Outcome: Progressing     Problem: Nutrition  Goal: Oral intake greater than 50%  Outcome: Progressing  Goal: Adequate PO fluid intake  Outcome: Progressing  Goal: Nutrition support goals are met within 48 hrs  Outcome: Progressing  Goal: Nutrition support is meeting 75% of nutrient needs  Outcome: Progressing  Goal: BG  mg/dL  Outcome: Progressing  Goal: Lab values WNL  Outcome: Progressing  Goal: Electrolytes WNL  Outcome: Progressing  Goal: Promote healing  Outcome: Progressing  Goal: Maintain stable weight  Outcome: Progressing  Goal: Reduce weight from edema/fluid  Outcome: Progressing     Problem: Skin  Goal: Decreased wound size/increased tissue granulation at next dressing change  Outcome: Progressing  Flowsheets (Taken 4/24/2024 1046)  Decreased wound size/increased tissue granulation at next dressing change:    Promote sleep for wound healing   Protective dressings over bony prominences  Goal: Participates in plan/prevention/treatment measures  Outcome: Progressing  Flowsheets (Taken 4/24/2024 1046)  Participates in plan/prevention/treatment measures:   Discuss with provider PT/OT consult   Elevate heels   Increase activity/out of bed for meals  Goal: Prevent/manage excess moisture  Outcome: Progressing  Flowsheets (Taken 4/24/2024 1046)  Prevent/manage excess moisture:   Cleanse incontinence/protect with barrier cream   Follow provider orders for dressing changes   Moisturize dry skin   Monitor for/manage infection if present  Goal: Prevent/minimize sheer/friction injuries  Outcome: Progressing  Flowsheets (Taken 4/24/2024 1046)  Prevent/minimize sheer/friction injuries:   Increase activity/out of bed for meals   Turn/reposition every 2 hours/use positioning/transfer devices   Use pull sheet  Goal: Promote/optimize nutrition  Outcome: Progressing  Flowsheets (Taken 4/24/2024 1046)  Promote/optimize nutrition:   Consume > 50% meals/supplements   Monitor/record intake including meals   Offer water/supplements/favorite foods  Goal: Promote skin healing  Outcome: Progressing  Flowsheets (Taken 4/24/2024 1046)  Promote skin healing:   Assess skin/pad under line(s)/device(s)   Protective dressings over bony prominences   Turn/reposition every 2 hours/use positioning/transfer devices     Problem: Fall/Injury  Goal: Not fall by end of shift  Outcome: Progressing  Goal: Be free from injury by end of the shift  Outcome: Progressing  Goal: Verbalize understanding of personal risk factors for fall in the hospital  Outcome: Progressing  Goal: Verbalize understanding of risk factor reduction measures to prevent injury from fall in the home  Outcome: Progressing  Goal: Use assistive devices by end of the shift  Outcome: Progressing  Goal: Pace activities to prevent fatigue by end of the shift  Outcome: Progressing     Problem:  "Pain  Goal: Takes deep breaths with improved pain control throughout the shift  Outcome: Progressing  Goal: Turns in bed with improved pain control throughout the shift  Outcome: Progressing  Goal: Walks with improved pain control throughout the shift  Outcome: Progressing  Goal: Performs ADL's with improved pain control throughout shift  Outcome: Progressing  Goal: Participates in PT with improved pain control throughout the shift  Outcome: Progressing  Goal: Free from opioid side effects throughout the shift  Outcome: Progressing  Goal: Free from acute confusion related to pain meds throughout the shift  Outcome: Progressing   The patient's goals for the shift include \"For my head to stop bleeding and to sleep.\"    The clinical goals for the shift include Patient will remain hemodynamically stable for the length of my shift.      "

## 2024-04-25 ENCOUNTER — APPOINTMENT (OUTPATIENT)
Dept: RADIOLOGY | Facility: HOSPITAL | Age: 79
DRG: 981 | End: 2024-04-25
Payer: MEDICARE

## 2024-04-25 PROBLEM — S01.01XA SCALP LACERATION: Status: RESOLVED | Noted: 2024-04-25 | Resolved: 2024-04-25

## 2024-04-25 PROBLEM — S01.01XA SCALP LACERATION: Status: ACTIVE | Noted: 2024-04-25
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
## 2024-04-25 LAB
ALBUMIN SERPL BCP-MCNC: 3.3 G/DL (ref 3.4–5)
ALP SERPL-CCNC: 103 U/L (ref 33–136)
ALT SERPL W P-5'-P-CCNC: 14 U/L (ref 7–45)
ANION GAP SERPL CALC-SCNC: 12 MMOL/L (ref 10–20)
AST SERPL W P-5'-P-CCNC: 15 U/L (ref 9–39)
BILIRUB SERPL-MCNC: 0.6 MG/DL (ref 0–1.2)
BUN SERPL-MCNC: 14 MG/DL (ref 6–23)
CALCIUM SERPL-MCNC: 8.7 MG/DL (ref 8.6–10.3)
CHLORIDE SERPL-SCNC: 102 MMOL/L (ref 98–107)
CO2 SERPL-SCNC: 27 MMOL/L (ref 21–32)
CREAT SERPL-MCNC: 1.67 MG/DL (ref 0.5–1.05)
EGFRCR SERPLBLD CKD-EPI 2021: 31 ML/MIN/1.73M*2
ERYTHROCYTE [DISTWIDTH] IN BLOOD BY AUTOMATED COUNT: 24.2 % (ref 11.5–14.5)
GLUCOSE BLD MANUAL STRIP-MCNC: 117 MG/DL (ref 74–99)
GLUCOSE BLD MANUAL STRIP-MCNC: 123 MG/DL (ref 74–99)
GLUCOSE BLD MANUAL STRIP-MCNC: 124 MG/DL (ref 74–99)
GLUCOSE BLD MANUAL STRIP-MCNC: 124 MG/DL (ref 74–99)
GLUCOSE BLD MANUAL STRIP-MCNC: 195 MG/DL (ref 74–99)
GLUCOSE SERPL-MCNC: 116 MG/DL (ref 74–99)
HCT VFR BLD AUTO: 23 % (ref 36–46)
HGB BLD-MCNC: 7.3 G/DL (ref 12–16)
MCH RBC QN AUTO: 29.2 PG (ref 26–34)
MCHC RBC AUTO-ENTMCNC: 31.7 G/DL (ref 32–36)
MCV RBC AUTO: 92 FL (ref 80–100)
NRBC BLD-RTO: 0 /100 WBCS (ref 0–0)
PLATELET # BLD AUTO: 201 X10*3/UL (ref 150–450)
POTASSIUM SERPL-SCNC: 3.6 MMOL/L (ref 3.5–5.3)
PROT SERPL-MCNC: 5.5 G/DL (ref 6.4–8.2)
RBC # BLD AUTO: 2.5 X10*6/UL (ref 4–5.2)
SODIUM SERPL-SCNC: 137 MMOL/L (ref 136–145)
WBC # BLD AUTO: 6.9 X10*3/UL (ref 4.4–11.3)

## 2024-04-25 PROCEDURE — 2500000001 HC RX 250 WO HCPCS SELF ADMINISTERED DRUGS (ALT 637 FOR MEDICARE OP): Performed by: STUDENT IN AN ORGANIZED HEALTH CARE EDUCATION/TRAINING PROGRAM

## 2024-04-25 PROCEDURE — 78226 HEPATOBILIARY SYSTEM IMAGING: CPT | Performed by: STUDENT IN AN ORGANIZED HEALTH CARE EDUCATION/TRAINING PROGRAM

## 2024-04-25 PROCEDURE — 99232 SBSQ HOSP IP/OBS MODERATE 35: CPT | Performed by: SURGERY

## 2024-04-25 PROCEDURE — A9537 TC99M MEBROFENIN: HCPCS | Performed by: INTERNAL MEDICINE

## 2024-04-25 PROCEDURE — 97116 GAIT TRAINING THERAPY: CPT | Mod: GP,CQ

## 2024-04-25 PROCEDURE — 2500000006 HC RX 250 W HCPCS SELF ADMINISTERED DRUGS (ALT 637 FOR ALL PAYERS): Performed by: STUDENT IN AN ORGANIZED HEALTH CARE EDUCATION/TRAINING PROGRAM

## 2024-04-25 PROCEDURE — 2500000004 HC RX 250 GENERAL PHARMACY W/ HCPCS (ALT 636 FOR OP/ED): Mod: JZ | Performed by: SURGERY

## 2024-04-25 PROCEDURE — 2500000004 HC RX 250 GENERAL PHARMACY W/ HCPCS (ALT 636 FOR OP/ED): Performed by: PHARMACIST

## 2024-04-25 PROCEDURE — 36415 COLL VENOUS BLD VENIPUNCTURE: CPT | Performed by: STUDENT IN AN ORGANIZED HEALTH CARE EDUCATION/TRAINING PROGRAM

## 2024-04-25 PROCEDURE — 99233 SBSQ HOSP IP/OBS HIGH 50: CPT | Performed by: INTERNAL MEDICINE

## 2024-04-25 PROCEDURE — 1200000002 HC GENERAL ROOM WITH TELEMETRY DAILY

## 2024-04-25 PROCEDURE — 80053 COMPREHEN METABOLIC PANEL: CPT | Performed by: STUDENT IN AN ORGANIZED HEALTH CARE EDUCATION/TRAINING PROGRAM

## 2024-04-25 PROCEDURE — 78227 HEPATOBIL SYST IMAGE W/DRUG: CPT

## 2024-04-25 PROCEDURE — 3430000001 HC RX 343 DIAGNOSTIC RADIOPHARMACEUTICALS: Performed by: INTERNAL MEDICINE

## 2024-04-25 PROCEDURE — 2500000004 HC RX 250 GENERAL PHARMACY W/ HCPCS (ALT 636 FOR OP/ED): Performed by: STUDENT IN AN ORGANIZED HEALTH CARE EDUCATION/TRAINING PROGRAM

## 2024-04-25 PROCEDURE — 82947 ASSAY GLUCOSE BLOOD QUANT: CPT

## 2024-04-25 PROCEDURE — 85027 COMPLETE CBC AUTOMATED: CPT | Performed by: STUDENT IN AN ORGANIZED HEALTH CARE EDUCATION/TRAINING PROGRAM

## 2024-04-25 RX ORDER — KIT FOR THE PREPARATION OF TECHNETIUM TC 99M MEBROFENIN 45 MG/10ML
5 INJECTION, POWDER, LYOPHILIZED, FOR SOLUTION INTRAVENOUS
Status: COMPLETED | OUTPATIENT
Start: 2024-04-25 | End: 2024-04-25

## 2024-04-25 RX ORDER — CIPROFLOXACIN 2 MG/ML
400 INJECTION, SOLUTION INTRAVENOUS EVERY 12 HOURS
Status: DISCONTINUED | OUTPATIENT
Start: 2024-04-25 | End: 2024-04-25

## 2024-04-25 RX ORDER — CIPROFLOXACIN 2 MG/ML
400 INJECTION, SOLUTION INTRAVENOUS EVERY 24 HOURS
Status: DISCONTINUED | OUTPATIENT
Start: 2024-04-25 | End: 2024-04-29

## 2024-04-25 RX ORDER — METRONIDAZOLE 500 MG/100ML
500 INJECTION, SOLUTION INTRAVENOUS EVERY 8 HOURS
Status: DISCONTINUED | OUTPATIENT
Start: 2024-04-25 | End: 2024-04-29

## 2024-04-25 RX ADMIN — CIPROFLOXACIN 400 MG: 400 INJECTION, SOLUTION INTRAVENOUS at 18:02

## 2024-04-25 RX ADMIN — HYDROMORPHONE HYDROCHLORIDE 0.2 MG: 0.2 INJECTION, SOLUTION INTRAMUSCULAR; INTRAVENOUS; SUBCUTANEOUS at 03:45

## 2024-04-25 RX ADMIN — KIT FOR THE PREPARATION OF TECHNETIUM TC 99M MEBROFENIN 5 MILLICURIE: 45 INJECTION, POWDER, LYOPHILIZED, FOR SOLUTION INTRAVENOUS at 09:45

## 2024-04-25 RX ADMIN — HEPARIN SODIUM 5000 UNITS: 5000 INJECTION INTRAVENOUS; SUBCUTANEOUS at 21:04

## 2024-04-25 RX ADMIN — PRAVASTATIN SODIUM 40 MG: 40 TABLET ORAL at 21:04

## 2024-04-25 RX ADMIN — PREGABALIN 75 MG: 75 CAPSULE ORAL at 21:04

## 2024-04-25 RX ADMIN — SEVELAMER CARBONATE 800 MG: 800 TABLET, FILM COATED ORAL at 16:35

## 2024-04-25 RX ADMIN — METRONIDAZOLE 500 MG: 500 INJECTION, SOLUTION INTRAVENOUS at 16:39

## 2024-04-25 RX ADMIN — HYDROMORPHONE HYDROCHLORIDE 0.2 MG: 0.2 INJECTION, SOLUTION INTRAMUSCULAR; INTRAVENOUS; SUBCUTANEOUS at 21:04

## 2024-04-25 RX ADMIN — METOPROLOL TARTRATE 25 MG: 25 TABLET, FILM COATED ORAL at 21:04

## 2024-04-25 ASSESSMENT — PAIN SCALES - GENERAL
PAINLEVEL_OUTOF10: 3
PAINLEVEL_OUTOF10: 9
PAINLEVEL_OUTOF10: 0 - NO PAIN
PAINLEVEL_OUTOF10: 8
PAINLEVEL_OUTOF10: 0 - NO PAIN
PAINLEVEL_OUTOF10: 0 - NO PAIN

## 2024-04-25 ASSESSMENT — COGNITIVE AND FUNCTIONAL STATUS - GENERAL
DAILY ACTIVITIY SCORE: 19
PERSONAL GROOMING: A LITTLE
STANDING UP FROM CHAIR USING ARMS: A LITTLE
MOVING TO AND FROM BED TO CHAIR: A LITTLE
TURNING FROM BACK TO SIDE WHILE IN FLAT BAD: A LITTLE
CLIMB 3 TO 5 STEPS WITH RAILING: A LITTLE
MOVING FROM LYING ON BACK TO SITTING ON SIDE OF FLAT BED WITH BEDRAILS: A LITTLE
WALKING IN HOSPITAL ROOM: A LITTLE
MOBILITY SCORE: 20
TOILETING: A LITTLE
TURNING FROM BACK TO SIDE WHILE IN FLAT BAD: A LITTLE
STANDING UP FROM CHAIR USING ARMS: A LITTLE
MOVING TO AND FROM BED TO CHAIR: A LITTLE
MOVING TO AND FROM BED TO CHAIR: A LITTLE
WALKING IN HOSPITAL ROOM: A LITTLE
EATING MEALS: A LITTLE
CLIMB 3 TO 5 STEPS WITH RAILING: A LOT
HELP NEEDED FOR BATHING: A LITTLE
HELP NEEDED FOR BATHING: A LITTLE
PERSONAL GROOMING: A LITTLE
MOBILITY SCORE: 18
CLIMB 3 TO 5 STEPS WITH RAILING: A LOT
MOBILITY SCORE: 17
DRESSING REGULAR LOWER BODY CLOTHING: A LITTLE
DRESSING REGULAR LOWER BODY CLOTHING: A LITTLE
DRESSING REGULAR UPPER BODY CLOTHING: A LITTLE
STANDING UP FROM CHAIR USING ARMS: A LITTLE
DRESSING REGULAR UPPER BODY CLOTHING: A LITTLE
TOILETING: A LITTLE
WALKING IN HOSPITAL ROOM: A LITTLE
DAILY ACTIVITIY SCORE: 18

## 2024-04-25 ASSESSMENT — ENCOUNTER SYMPTOMS
VOMITING: 0
CONFUSION: 0
FEVER: 0
NAUSEA: 0
SHORTNESS OF BREATH: 0
ABDOMINAL PAIN: 1

## 2024-04-25 ASSESSMENT — PAIN DESCRIPTION - LOCATION: LOCATION: ABDOMEN

## 2024-04-25 ASSESSMENT — PAIN DESCRIPTION - ORIENTATION: ORIENTATION: RIGHT;UPPER

## 2024-04-25 ASSESSMENT — PAIN - FUNCTIONAL ASSESSMENT
PAIN_FUNCTIONAL_ASSESSMENT: 0-10

## 2024-04-25 NOTE — PROGRESS NOTES
Medication Adjustment    The following medication(s) was/were adjusted for Tana Hargrove per protocol/policy due to altered renal function.    Medication(s) adjusted:   Ciprofloxacin adjusted to 400 mg IV q24h for Abdominal infection patient with ESRD on HD    Wan Ortega [facility]

## 2024-04-25 NOTE — PROGRESS NOTES
Physical Therapy    Physical Therapy Treatment    Patient Name: Tana Hargrove  MRN: [id number]  Today's Date: 4/25/2024  Time Calculation  Start Time: 1343  Stop Time: 1355  Time Calculation (min): 12 min       Assessment/Plan   PT Assessment  PT Assessment Results: Decreased strength, Decreased endurance  Rehab Prognosis: Good  Assessment Comment: pt up in chair upon arrival. pt states she is tired and just got back from a can. pt able to increase gait with 1x standing rest break about 10 seconds. pt states she cant take deep breaths due to pain. pt had no LOB during amb. talked to pt about exercises to do while sitting up throughout the day. pt states that was enough activity for right now.  End of Session Patient Position: Up in chair, Alarm on     PT Plan  Treatment/Interventions: Bed mobility, Transfer training, Gait training, Balance training, Strengthening, Endurance training, Therapeutic exercise, Therapeutic activity, Home exercise program  PT Plan: Skilled PT  PT Frequency: 3 times per week  PT Discharge Recommendations: Low intensity level of continued care (May benefit from additional family support at home prn at least initially.)  PT - OK to Discharge: Yes (when medically cleared)      General Visit Information:   PT  Visit  PT Received On: 04/25/24       Subjective   Precautions:     Vital Signs:       Objective   Pain:  Pain Assessment  Pain Score: 0 - No pain  Cognition:       Treatments:            Ambulation/Gait Training  Ambulation/Gait Training Performed: Yes  Ambulation/Gait Training 1  Surface 1: Level tile  Device 1: Rolling walker  Assistance 1: Minimum assistance  Quality of Gait 1: Diminished heel strike, Decreased step length  Comments/Distance (ft) 1: 45  Transfer 1  Technique 1: Sit to stand  Transfer Device 1: Walker  Transfer Level of Assistance 1: Minimum assistance  Trials/Comments 1: from chair    Outcome Measures:  [facility] Basic Mobility  Turning from your back to your side while in  a flat bed without using bedrails: A little  Moving from lying on your back to sitting on the side of a flat bed without using bedrails: A little  Moving to and from bed to chair (including a wheelchair): A little  Standing up from a chair using your arms (e.g. wheelchair or bedside chair): A little  To walk in hospital room: A little  Climbing 3-5 steps with railing: A lot  Basic Mobility - Total Score: 17    Education Documentation  Mobility Training, taught by Mabel Zaman PTA at 4/25/2024  2:08 PM.  Learner: Patient  Readiness: Acceptance  Method: Explanation  Response: Verbalizes Understanding    Education Comments  No comments found.        OP EDUCATION:       Encounter Problems       Encounter Problems (Active)       PT Problem       transfers (Progressing)       Start:  04/24/24    Expected End:  04/29/24       Patient will perform all transfers with SBA x1          gait (Progressing)       Start:  04/24/24    Expected End:  04/29/24       Patient will amb 100+ feet with SBA x1          strengthening  (Progressing)       Start:  04/24/24    Expected End:  04/29/24       Patient will perform 20+ reps of AROM/RROM for DIGNA LE's to improve safety and functional independence              Pain - Adult

## 2024-04-25 NOTE — PROGRESS NOTES
MD messaged regarding patient with recurrent RUQ pain similar to prior when diagnosed with questionable cholecystitis last admission. will give 1 low dose of dilaudid and follow-up cbc and cmp for further evaluation.

## 2024-04-25 NOTE — PROGRESS NOTES
Verified discharge plan via phone due to working remote. Plan is home with [facility] to resume and that was confirmed in [facility] as well. [facility] 18/17. ADOD 4/27. CT to follow. Macy GAMBINON/RN-TCC

## 2024-04-25 NOTE — PROGRESS NOTES
Tana Hargrove is a 79 y.o. female on day 3 of admission presenting with Fall, initial encounter.      Subjective   79F hx CAD, HTN, COPD, ESRD on HD via permacath, DM2, MDS, gout, neuropathy presents for fall     Patient thinks that she slipped on the floor this afternoon and fell backwards because of that but unsure. thinks knees may have given out as well. felt well after her hospitalization w/o abd pain, n/v, generalized or focal weakness, f/c. has been eating ok. denies dizziness or syncope/loss of consciousness. fell back and hit her buttocks and head. head bled for awhile, now self-resolved w/o sutures. no pain in buttocks/back. no other injuries. not able to get up on own until got help 4h later. no recent cp, sob, diarrhea, dysuria, ha, blurry vision, numbness. taking asa.     4/23: Patient was seen and examined.  She is awake and alert and interactive today.  Multiple changes of bandage dressing due to soaked with blood.  Being transfused with 1 unit of packed red blood cells and 1 unit coming up later today.  Will repeat H&H likely transfuse with 10 units.  Patient completed hemodialysis later today.  PT OT to evaluate for functional status.  Repeat H&H at 6 PM.  Repeat CBC and BMP.  4/24: Patient was seen and examined.  She had an episode of continued bleeding overnight and required more stable on the scalp in order to achieve hemostasis.  I will consult general/trauma surgery to evaluate scalp staples.  Trend CBC and transfuse with hemoglobin less than 7.  Seen by PT OT who recommend home with home health.  Potential discharge within the next 24 to 48 hours  4/25: Patient was seen and examined.  Developed right upper quadrant pain overnight after eating a hamburger for dinner.  Hemoglobin has remained stable at 7.6 this morning.  Seen by general surgery who has ordered a HIDA scan.  General surgery to consider cholecystectomy during this admission.          Objective     Last Recorded Vitals  /54  (BP Location: Left arm, Patient Position: Lying)   Pulse 69   Temp 37 °C (98.6 °F) (Temporal)   Resp 18   Wt 68 kg (150 lb)   SpO2 95%   Intake/Output last 3 Shifts:    Intake/Output Summary (Last 24 hours) at 4/25/2024 1244  Last data filed at 4/25/2024 0835  Gross per 24 hour   Intake 880 ml   Output 240 ml   Net 640 ml       Admission Weight  Weight: 68 kg (150 lb) (04/22/24 1443)    Daily Weight  04/22/24 : 68 kg (150 lb)    Image Results  ECG 12 lead  Sinus rhythm  Nonspecific intraventricular conduction delay  Nonspecific T abnrm, anterolateral leads  Artifact in lead(s) I,II,III,aVR,aVL,aVF,V1,V2    See ED provider note for full interpretation and clinical correlation  Confirmed by Terri Bermudez ([id number]) on 4/24/2024 8:14:49 PM      Physical Exam  General: NAD, well-appearing, cooperative. no jaundice, pallor, rash, bruises  HEENT: NC/AT, MMM, no oral lesions or pharyngeal erythema. PERRL. no scleral icterus or conjunctival injection. neck soft w/o masses or LAD. superficial scalp lac well-approximated. no active bleed  CV: RRR, no murmurs, rubs, or gallops. No JVD.  Chest: breathing unlabored. CTAB w/ adequate air-entry. permacath c/d/i  Abd: non-distended. BS+. soft, non-tender. no masses or organomegaly.  Extr: wwp, 2+ and symmetric peripheral pulses. no LE edema.  Neuro: AAOx3. CNII-XII intact. no focal findings.       Relevant Results               Assessment/Plan                  Active Problems:    Thickening of wall of gallbladder    79F hx CAD, HTN, COPD, ESRD on HD via permacath, DM2, MDS, gout, neuropathy presents for fall     #ground level fall, mechanical:  #L parietal scalp laceration/small hematoma s/p staples x2:  slipped on floor. no pre/syncope or new focal deficits. no other injuries. ct head w/o ich. no active scalp bleeding. on asa, no anticoagulants  - ekg, ck, ct head/cspine wnl; f/u cxr, pelvic xr  - pt eval; fall precautions; wound care; needs staples removed in 7d     #ESRD  on HD via permacath:   missed hd today 4/22, no urgent needs  - consult renal; sevelamer, vid d     # History of acute cholecystitis  unclear dx from last hospitalization. sx improved.  - continued augmentin to finish 10d  -HIDA scan ordered and pending   - General surgery on board and considering cholecystectomy       #CAD: stable; mtp, statin, asa  #HTN: stable; amlodipine, mtp  #COPD: stable; albuterol prn  #DM2: bg wnl. iss; hold outpt meds  #MDS: cell counts stable; outpt heme f/u  #gout: home allopurinol  #neuropathy: home lyrica     #FEN/GI: renal/diabetic  #PPx: heparin sq  #Lines/Tubes/Drains: piv, permacath  #Analgesia/Sedation: tylenol  #Code: full  #Dispo: pending clinical stability  #Contact: tremaine daniel [phone]          Spent 35 minutes in the follow-up management of this patient today.    Naga Carr MD

## 2024-04-25 NOTE — CARE PLAN
Problem: Pain - Adult  Goal: Verbalizes/displays adequate comfort level or baseline comfort level  Outcome: Progressing     Problem: Safety - Adult  Goal: Free from fall injury  Outcome: Progressing     Problem: Discharge Planning  Goal: Discharge to home or other facility with appropriate resources  Outcome: Progressing     Problem: Chronic Conditions and Co-morbidities  Goal: Patient's chronic conditions and co-morbidity symptoms are monitored and maintained or improved  Outcome: Progressing     Problem: Diabetes  Goal: Maintain electrolyte levels within acceptable range throughout shift  Outcome: Progressing  Goal: Maintain glucose levels >70mg/dl to <250mg/dl throughout shift  Outcome: Progressing  Goal: No changes in neurological exam by end of shift  Outcome: Progressing  Goal: Learn about and adhere to nutrition recommendations by end of shift  Outcome: Progressing  Goal: Vital signs within normal range for age by end of shift  Outcome: Progressing  Goal: Increase self care and/or family involovement by end of shift  Outcome: Progressing  Goal: Receive DSME education by end of shift  Outcome: Progressing     Problem: Nutrition  Goal: Oral intake greater than 50%  Outcome: Progressing  Goal: Adequate PO fluid intake  Outcome: Progressing  Goal: Nutrition support goals are met within 48 hrs  Outcome: Progressing  Goal: Nutrition support is meeting 75% of nutrient needs  Outcome: Progressing  Goal: BG  mg/dL  Outcome: Progressing  Goal: Lab values WNL  Outcome: Progressing  Goal: Electrolytes WNL  Outcome: Progressing  Goal: Promote healing  Outcome: Progressing  Goal: Maintain stable weight  Outcome: Progressing  Goal: Reduce weight from edema/fluid  Outcome: Progressing     Problem: Skin  Goal: Decreased wound size/increased tissue granulation at next dressing change  Outcome: Progressing  Flowsheets (Taken 4/25/2024 1044)  Decreased wound size/increased tissue granulation at next dressing change:    Promote sleep for wound healing   Protective dressings over bony prominences  Goal: Participates in plan/prevention/treatment measures  Outcome: Progressing  Flowsheets (Taken 4/25/2024 1047)  Participates in plan/prevention/treatment measures:   Discuss with provider PT/OT consult   Elevate heels   Increase activity/out of bed for meals  Goal: Prevent/manage excess moisture  Outcome: Progressing  Flowsheets (Taken 4/25/2024 1047)  Prevent/manage excess moisture:   Cleanse incontinence/protect with barrier cream   Follow provider orders for dressing changes   Moisturize dry skin   Monitor for/manage infection if present  Goal: Prevent/minimize sheer/friction injuries  Outcome: Progressing  Flowsheets (Taken 4/25/2024 1047)  Prevent/minimize sheer/friction injuries:   Increase activity/out of bed for meals   Turn/reposition every 2 hours/use positioning/transfer devices   Use pull sheet  Goal: Promote/optimize nutrition  Outcome: Progressing  Flowsheets (Taken 4/25/2024 1047)  Promote/optimize nutrition:   Consume > 50% meals/supplements   Monitor/record intake including meals   Offer water/supplements/favorite foods  Goal: Promote skin healing  Outcome: Progressing  Flowsheets (Taken 4/25/2024 1047)  Promote skin healing:   Assess skin/pad under line(s)/device(s)   Protective dressings over bony prominences   Turn/reposition every 2 hours/use positioning/transfer devices     Problem: Fall/Injury  Goal: Not fall by end of shift  Outcome: Progressing  Goal: Be free from injury by end of the shift  Outcome: Progressing  Goal: Verbalize understanding of personal risk factors for fall in the hospital  Outcome: Progressing  Goal: Verbalize understanding of risk factor reduction measures to prevent injury from fall in the home  Outcome: Progressing  Goal: Use assistive devices by end of the shift  Outcome: Progressing  Goal: Pace activities to prevent fatigue by end of the shift  Outcome: Progressing     Problem:  "Pain  Goal: Takes deep breaths with improved pain control throughout the shift  Outcome: Progressing  Goal: Turns in bed with improved pain control throughout the shift  Outcome: Progressing  Goal: Walks with improved pain control throughout the shift  Outcome: Progressing  Goal: Performs ADL's with improved pain control throughout shift  Outcome: Progressing  Goal: Participates in PT with improved pain control throughout the shift  Outcome: Progressing  Goal: Free from opioid side effects throughout the shift  Outcome: Progressing  Goal: Free from acute confusion related to pain meds throughout the shift  Outcome: Progressing   The patient's goals for the shift include \"For my head to stop bleeding and to sleep.\"    The clinical goals for the shift include remain free from falls        "

## 2024-04-25 NOTE — SIGNIFICANT EVENT
Chart reviewed.    Images and labs assessed.    On exam, TTP RUQ.   HIDA = cholecystitis  LFTs and WBC = relatively wnl  Will add-on to OR for 26th April 2024 for lap, poss open chidi  Clrs for remainder of today; NPO pMN except for PO meds  Would rec Cards consult for risk stratification and medical optimisation  Patient and primary attending aware of plan.

## 2024-04-25 NOTE — PROGRESS NOTES
"  GENERAL SURGERY / TRAUMA PROGRESS NOTE    Patient: Tana Hargrove  Room: 3308/3308-A    Age: 79 y.o.   Gender: female  Attending: Naga Carr, Juan    MRN: [id number]  Admission Date: 4/22/2024    PCP: Carlos Higgins MD       Tana Hargrove is a 79 y.o. female on day 3  of admission presenting with Fall, initial encounter.    SUBJECTIVE   Interval History:  No significant bleeding from the scalp laceration overnight.  However, she did develop right upper quadrant pain after eating a hamburger for dinner.  Abdominal pain is improved this morning.  No associated nausea or vomiting.    ROS  Review of Systems   Constitutional:  Negative for fever.   Respiratory:  Negative for shortness of breath.    Cardiovascular:  Negative for chest pain.   Gastrointestinal:  Positive for abdominal pain. Negative for nausea and vomiting.   Psychiatric/Behavioral:  Negative for confusion.           OBJECTIVE   Last Recorded Vitals  Blood pressure 155/54, pulse 69, temperature 37 °C (98.6 °F), temperature source Temporal, resp. rate 18, height 1.549 m (5' 1\"), weight 68 kg (150 lb), SpO2 95%.    Intake/Output last 3 Shifts:  I/O last 3 completed shifts:  In: 1647.3 (24.2 mL/kg) [P.O.:830; I.V.:30 (0.4 mL/kg); Blood:387.3; Other:400]  Out: 665 (9.8 mL/kg) [Urine:625 (0.3 mL/kg/hr); Other:40]  Weight: 68 kg     PHYSICAL EXAM  Physical Exam   Constitutional:       General: She is not in acute distress.  HENT:      Head: Normocephalic.      Comments: Posterior scalp laceration well approximated with staples in place. No active bleeding or oozing, some surrounding ecchymosis.   Eyes:      Extraocular Movements: Extraocular movements intact.      Conjunctiva/sclera: Conjunctivae normal.   Cardiovascular:      Rate and Rhythm: Normal rate and regular rhythm.      Pulses: Normal pulses.   Pulmonary:      Effort: Pulmonary effort is normal. No respiratory distress.   Abdominal:      General: There is no distension.      Palpations: " Abdomen is soft.      Tenderness: There is no abdominal tenderness.   Musculoskeletal:         General: No swelling. Normal range of motion.      Cervical back: Neck supple.   Skin:     General: Skin is warm and dry.   Neurological:      General: No focal deficit present.      Mental Status: She is alert and oriented to person, place, and time.     RESULTS   Labs  Results for orders placed or performed during the hospital encounter of 04/22/24 (from the past 24 hour(s))   POCT GLUCOSE   Result Value Ref Range    POCT Glucose 211 (H) 74 - 99 mg/dL   POCT GLUCOSE   Result Value Ref Range    POCT Glucose 93 74 - 99 mg/dL   POCT GLUCOSE   Result Value Ref Range    POCT Glucose 195 (H) 74 - 99 mg/dL   CBC   Result Value Ref Range    WBC 6.9 4.4 - 11.3 x10*3/uL    nRBC 0.0 0.0 - 0.0 /100 WBCs    RBC 2.50 (L) 4.00 - 5.20 x10*6/uL    Hemoglobin 7.3 (L) 12.0 - 16.0 g/dL    Hematocrit 23.0 (L) 36.0 - 46.0 %    MCV 92 80 - 100 fL    MCH 29.2 26.0 - 34.0 pg    MCHC 31.7 (L) 32.0 - 36.0 g/dL    RDW 24.2 (H) 11.5 - 14.5 %    Platelets 201 150 - 450 x10*3/uL   Comprehensive metabolic panel   Result Value Ref Range    Glucose 116 (H) 74 - 99 mg/dL    Sodium 137 136 - 145 mmol/L    Potassium 3.6 3.5 - 5.3 mmol/L    Chloride 102 98 - 107 mmol/L    Bicarbonate 27 21 - 32 mmol/L    Anion Gap 12 10 - 20 mmol/L    Urea Nitrogen 14 6 - 23 mg/dL    Creatinine 1.67 (H) 0.50 - 1.05 mg/dL    eGFR 31 (L) >60 mL/min/1.73m*2    Calcium 8.7 8.6 - 10.3 mg/dL    Albumin 3.3 (L) 3.4 - 5.0 g/dL    Alkaline Phosphatase 103 33 - 136 U/L    Total Protein 5.5 (L) 6.4 - 8.2 g/dL    AST 15 9 - 39 U/L    Bilirubin, Total 0.6 0.0 - 1.2 mg/dL    ALT 14 7 - 45 U/L   POCT GLUCOSE   Result Value Ref Range    POCT Glucose 123 (H) 74 - 99 mg/dL     *Note: Due to a large number of results and/or encounters for the requested time period, some results have not been displayed. A complete set of results can be found in Results Review.       Radiology Resutls  No  results found.       ASSESSMENT / PLAN   Principal Problem:    Fall, initial encounter  Active Problems:    Thickening of wall of gallbladder    Scalp laceration       PLAN  1.  From a trauma standpoint, there is no evidence of ongoing bleeding from the scalp laceration.  Staples need to be removed in 7 to 10 days.  2.  She was previously admitted and found to have a thickened gallbladder wall.  She was initially evaluated by Dr. Grimes.  She was then readmitted shortly after discharge with right upper quadrant abdominal pain, and patient chose to be treated medically as she was going to her sister's .  She was started on Augmentin, and this antibiotic has been continued during this hospitalization.  Despite being told to stay on a low grease low-fat diet, patient ate a hamburger for dinner yesterday and developed right upper quadrant pain overnight requiring IV Dilaudid.  The pain is better this morning, and she remains without leukocytosis or elevated LFTs.  Will check HIDA scan to check for any evidence of acute cholecystitis.  Will review HIDA scan results and patient's symptoms with Dr. Grimes to manage gallbladder issues.      Adina Kumari MD, FACS  [facility]  [address] [place] [address];   [address]  [place] [id number] [phone]

## 2024-04-25 NOTE — PROGRESS NOTES
"      Nephrology Progress Note      Nephrology following for ESRD.   Events over night:     Patient had acute gallbladder pain at 2 AM  No shortness of breath    /63 (BP Location: Left arm, Patient Position: Lying)   Pulse 68   Temp 36.7 °C (98 °F) (Temporal)   Resp 18   Ht 1.549 m (5' 1\")   Wt 68 kg (150 lb)   SpO2 94%   BMI 28.34 kg/m²     Input / Output:  24 HR:   Intake/Output Summary (Last 24 hours) at 4/25/2024 1419  Last data filed at 4/25/2024 0835  Gross per 24 hour   Intake 880 ml   Output 240 ml   Net 640 ml       Physical Exam   Alert and oriented x 3 NAD  Neck: no JVD  CV: RRR  Lungs: CTA bilaterally  Abd: soft, NT, ND   Ext: no lower extremity edema    Scheduled medications  allopurinol, 100 mg, oral, Daily  amLODIPine, 5 mg, oral, Daily  amoxicillin-pot clavulanate, 1 tablet, oral, BID  aspirin, 81 mg, oral, Daily  cholecalciferol, 2,000 Units, oral, Daily  cyanocobalamin, 1,000 mcg, oral, Daily  heparin (porcine), 5,000 Units, subcutaneous, q12h  insulin lispro, 0-5 Units, subcutaneous, TID with meals  metoprolol tartrate, 25 mg, oral, BID  neomycin-bacitracnZn-polymyxnB, , Topical, Daily  pravastatin, 40 mg, oral, Nightly  pregabalin, 75 mg, oral, BID  sevelamer carbonate, 800 mg, oral, TID with meals      Continuous medications     PRN medications  PRN medications: acetaminophen, dextrose, dextrose, glucagon, glucagon, HYDROmorphone, ipratropium-albuteroL, melatonin, ondansetron **OR** ondansetron, polyethylene glycol   Results from last 7 days   Lab Units 04/25/24  0352   SODIUM mmol/L 137   POTASSIUM mmol/L 3.6   CHLORIDE mmol/L 102   CO2 mmol/L 27   BUN mg/dL 14   CREATININE mg/dL 1.67*   CALCIUM mg/dL 8.7   PROTEIN TOTAL g/dL 5.5*   BILIRUBIN TOTAL mg/dL 0.6   ALK PHOS U/L 103   ALT U/L 14   AST U/L 15   GLUCOSE mg/dL 116*      Results from last 7 days   Lab Units 04/22/24  1601 04/19/24  0526   MAGNESIUM mg/dL 1.81 2.00      Results from last 7 days   Lab Units 04/25/24  0352 " 04/24/24  0454 04/23/24  2151 04/23/24  0444   WBC AUTO x10*3/uL 6.9 6.8  --  5.9   HEMOGLOBIN g/dL 7.3* 7.7* 7.8* 4.8*   HEMATOCRIT % 23.0* 22.0* 22.7* 15.3*   PLATELETS AUTO x10*3/uL 201 209  --  235        Assessment & Plan:     Patient is 79 y.o. female who is admitted to hospital for fall with acute blood loss anemia after scalp laceration. Nephrology consulted in view of ESRD.      ESRD- HD [facility] at [place]         Anemia acute on chronic with hx of MDS-   Has been getting Micera and iron at her HD unit. 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     Pinky Christie DO  4/25/2024  2:19 PM     [facility]    [address], [address]   [place] [address]  Office: [phone]

## 2024-04-25 NOTE — CARE PLAN
"The patient's goals for the shift include \"For my head to stop bleeding and to sleep.\"    The clinical goals for the shift include remain free from falls      Problem: Pain - Adult  Goal: Verbalizes/displays adequate comfort level or baseline comfort level  Outcome: Progressing     Problem: Safety - Adult  Goal: Free from fall injury  Outcome: Progressing     Problem: Discharge Planning  Goal: Discharge to home or other facility with appropriate resources  Outcome: Progressing     Problem: Chronic Conditions and Co-morbidities  Goal: Patient's chronic conditions and co-morbidity symptoms are monitored and maintained or improved  Outcome: Progressing     Problem: Diabetes  Goal: Maintain electrolyte levels within acceptable range throughout shift  Outcome: Progressing  Goal: Maintain glucose levels >70mg/dl to <250mg/dl throughout shift  Outcome: Progressing  Goal: No changes in neurological exam by end of shift  Outcome: Progressing  Goal: Learn about and adhere to nutrition recommendations by end of shift  Outcome: Progressing  Goal: Vital signs within normal range for age by end of shift  Outcome: Progressing  Goal: Increase self care and/or family involovement by end of shift  Outcome: Progressing  Goal: Receive DSME education by end of shift  Outcome: Progressing     Problem: Nutrition  Goal: Oral intake greater than 50%  Outcome: Progressing  Goal: Adequate PO fluid intake  Outcome: Progressing  Goal: Nutrition support goals are met within 48 hrs  Outcome: Progressing  Goal: Nutrition support is meeting 75% of nutrient needs  Outcome: Progressing  Goal: BG  mg/dL  Outcome: Progressing  Goal: Lab values WNL  Outcome: Progressing  Goal: Electrolytes WNL  Outcome: Progressing  Goal: Promote healing  Outcome: Progressing  Goal: Maintain stable weight  Outcome: Progressing  Goal: Reduce weight from edema/fluid  Outcome: Progressing     Problem: Skin  Goal: Decreased wound size/increased tissue granulation at " next dressing change  Outcome: Progressing  Goal: Participates in plan/prevention/treatment measures  Outcome: Progressing  Goal: Prevent/manage excess moisture  Outcome: Progressing  Goal: Prevent/minimize sheer/friction injuries  Outcome: Progressing  Goal: Promote/optimize nutrition  Outcome: Progressing  Goal: Promote skin healing  Outcome: Progressing     Problem: Fall/Injury  Goal: Not fall by end of shift  Outcome: Progressing  Goal: Be free from injury by end of the shift  Outcome: Progressing  Goal: Verbalize understanding of personal risk factors for fall in the hospital  Outcome: Progressing  Goal: Verbalize understanding of risk factor reduction measures to prevent injury from fall in the home  Outcome: Progressing  Goal: Use assistive devices by end of the shift  Outcome: Progressing  Goal: Pace activities to prevent fatigue by end of the shift  Outcome: Progressing     Problem: Pain  Goal: Takes deep breaths with improved pain control throughout the shift  Outcome: Progressing  Goal: Turns in bed with improved pain control throughout the shift  Outcome: Progressing  Goal: Walks with improved pain control throughout the shift  Outcome: Progressing  Goal: Performs ADL's with improved pain control throughout shift  Outcome: Progressing  Goal: Participates in PT with improved pain control throughout the shift  Outcome: Progressing  Goal: Free from opioid side effects throughout the shift  Outcome: Progressing  Goal: Free from acute confusion related to pain meds throughout the shift  Outcome: Progressing

## 2024-04-25 NOTE — PROGRESS NOTES
Occupational Therapy                 Therapy Communication Note    Patient Name: Tana Hargrove  MRN: [id number]  Today's Date: 4/25/2024     Discipline: Occupational Therapy    Missed Visit Reason: Missed Visit Reason: Patient in a medical procedure (Attempted OT tx session at this time, however pt OOR for medical procedure.)    Missed Time: Attempt    Comment:

## 2024-04-26 ENCOUNTER — APPOINTMENT (OUTPATIENT)
Dept: DIALYSIS | Facility: HOSPITAL | Age: 79
End: 2024-04-26
Payer: MEDICARE

## 2024-04-26 ENCOUNTER — ANESTHESIA (OUTPATIENT)
Dept: OPERATING ROOM | Facility: HOSPITAL | Age: 79
DRG: 981 | End: 2024-04-26
Payer: MEDICARE

## 2024-04-26 ENCOUNTER — DOCUMENTATION (OUTPATIENT)
Dept: CARE COORDINATION | Facility: CLINIC | Age: 79
End: 2024-04-26

## 2024-04-26 ENCOUNTER — ANESTHESIA EVENT (OUTPATIENT)
Dept: OPERATING ROOM | Facility: HOSPITAL | Age: 79
DRG: 981 | End: 2024-04-26
Payer: MEDICARE

## 2024-04-26 PROBLEM — K81.1 CHRONIC CHOLECYSTITIS: Status: ACTIVE | Noted: 2024-04-22

## 2024-04-26 LAB
ABO GROUP (TYPE) IN BLOOD: NORMAL
ALBUMIN SERPL BCP-MCNC: 3.2 G/DL (ref 3.4–5)
ALP SERPL-CCNC: 95 U/L (ref 33–136)
ALT SERPL W P-5'-P-CCNC: 12 U/L (ref 7–45)
AMYLASE SERPL-CCNC: 34 U/L (ref 29–103)
ANION GAP SERPL CALC-SCNC: 11 MMOL/L (ref 10–20)
ANTIBODY SCREEN: NORMAL
APTT PPP: 29 SECONDS (ref 27–38)
AST SERPL W P-5'-P-CCNC: 14 U/L (ref 9–39)
BASOPHILS # BLD AUTO: 0.01 X10*3/UL (ref 0–0.1)
BASOPHILS NFR BLD AUTO: 0.2 %
BILIRUB DIRECT SERPL-MCNC: 0.2 MG/DL (ref 0–0.3)
BILIRUB SERPL-MCNC: 0.6 MG/DL (ref 0–1.2)
BUN SERPL-MCNC: 18 MG/DL (ref 6–23)
CALCIUM SERPL-MCNC: 9 MG/DL (ref 8.6–10.3)
CHLORIDE SERPL-SCNC: 104 MMOL/L (ref 98–107)
CO2 SERPL-SCNC: 26 MMOL/L (ref 21–32)
CREAT SERPL-MCNC: 1.93 MG/DL (ref 0.5–1.05)
DACRYOCYTES BLD QL SMEAR: NORMAL
EGFRCR SERPLBLD CKD-EPI 2021: 26 ML/MIN/1.73M*2
EOSINOPHIL # BLD AUTO: 0.24 X10*3/UL (ref 0–0.4)
EOSINOPHIL NFR BLD AUTO: 4.8 %
ERYTHROCYTE [DISTWIDTH] IN BLOOD BY AUTOMATED COUNT: 23.8 % (ref 11.5–14.5)
GLUCOSE BLD MANUAL STRIP-MCNC: 103 MG/DL (ref 74–99)
GLUCOSE BLD MANUAL STRIP-MCNC: 105 MG/DL (ref 74–99)
GLUCOSE BLD MANUAL STRIP-MCNC: 173 MG/DL (ref 74–99)
GLUCOSE BLD MANUAL STRIP-MCNC: 178 MG/DL (ref 74–99)
GLUCOSE BLD MANUAL STRIP-MCNC: 186 MG/DL (ref 74–99)
GLUCOSE SERPL-MCNC: 96 MG/DL (ref 74–99)
HCT VFR BLD AUTO: 22.2 % (ref 36–46)
HGB BLD-MCNC: 7.1 G/DL (ref 12–16)
HYPOCHROMIA BLD QL SMEAR: NORMAL
IMM GRANULOCYTES # BLD AUTO: 0.03 X10*3/UL (ref 0–0.5)
IMM GRANULOCYTES NFR BLD AUTO: 0.6 % (ref 0–0.9)
INR PPP: 1.1 (ref 0.9–1.1)
LIPASE SERPL-CCNC: 31 U/L (ref 9–82)
LYMPHOCYTES # BLD AUTO: 1.51 X10*3/UL (ref 0.8–3)
LYMPHOCYTES NFR BLD AUTO: 30 %
MAGNESIUM SERPL-MCNC: 1.79 MG/DL (ref 1.6–2.4)
MCH RBC QN AUTO: 29.6 PG (ref 26–34)
MCHC RBC AUTO-ENTMCNC: 32 G/DL (ref 32–36)
MCV RBC AUTO: 93 FL (ref 80–100)
MONOCYTES # BLD AUTO: 0.44 X10*3/UL (ref 0.05–0.8)
MONOCYTES NFR BLD AUTO: 8.7 %
NEUTROPHILS # BLD AUTO: 2.8 X10*3/UL (ref 1.6–5.5)
NEUTROPHILS NFR BLD AUTO: 55.7 %
NRBC BLD-RTO: 0.6 /100 WBCS (ref 0–0)
OVALOCYTES BLD QL SMEAR: NORMAL
PHOSPHATE SERPL-MCNC: 4.3 MG/DL (ref 2.5–4.9)
PLATELET # BLD AUTO: 193 X10*3/UL (ref 150–450)
POLYCHROMASIA BLD QL SMEAR: NORMAL
POTASSIUM SERPL-SCNC: 3.6 MMOL/L (ref 3.5–5.3)
PROT SERPL-MCNC: 5.4 G/DL (ref 6.4–8.2)
PROTHROMBIN TIME: 12.6 SECONDS (ref 9.8–12.8)
RBC # BLD AUTO: 2.4 X10*6/UL (ref 4–5.2)
RBC MORPH BLD: NORMAL
RH FACTOR (ANTIGEN D): NORMAL
SODIUM SERPL-SCNC: 137 MMOL/L (ref 136–145)
WBC # BLD AUTO: 5 X10*3/UL (ref 4.4–11.3)

## 2024-04-26 PROCEDURE — 82947 ASSAY GLUCOSE BLOOD QUANT: CPT | Mod: 91

## 2024-04-26 PROCEDURE — 2500000004 HC RX 250 GENERAL PHARMACY W/ HCPCS (ALT 636 FOR OP/ED)

## 2024-04-26 PROCEDURE — 49320 DIAG LAPARO SEPARATE PROC: CPT | Performed by: SURGERY

## 2024-04-26 PROCEDURE — 2500000001 HC RX 250 WO HCPCS SELF ADMINISTERED DRUGS (ALT 637 FOR MEDICARE OP): Performed by: SURGERY

## 2024-04-26 PROCEDURE — 80048 BASIC METABOLIC PNL TOTAL CA: CPT | Performed by: SURGERY

## 2024-04-26 PROCEDURE — 82150 ASSAY OF AMYLASE: CPT | Performed by: SURGERY

## 2024-04-26 PROCEDURE — 2500000004 HC RX 250 GENERAL PHARMACY W/ HCPCS (ALT 636 FOR OP/ED): Mod: JZ | Performed by: SURGERY

## 2024-04-26 PROCEDURE — 3600000003 HC OR TIME - INITIAL BASE CHARGE - PROCEDURE LEVEL THREE: Performed by: SURGERY

## 2024-04-26 PROCEDURE — 83735 ASSAY OF MAGNESIUM: CPT | Performed by: SURGERY

## 2024-04-26 PROCEDURE — 97110 THERAPEUTIC EXERCISES: CPT | Mod: GP,CQ | Performed by: PHYSICAL THERAPY ASSISTANT

## 2024-04-26 PROCEDURE — 97116 GAIT TRAINING THERAPY: CPT | Mod: GP,CQ | Performed by: PHYSICAL THERAPY ASSISTANT

## 2024-04-26 PROCEDURE — 8010000001 HC DIALYSIS - HEMODIALYSIS PER DAY

## 2024-04-26 PROCEDURE — 2500000004 HC RX 250 GENERAL PHARMACY W/ HCPCS (ALT 636 FOR OP/ED): Performed by: ANESTHESIOLOGY

## 2024-04-26 PROCEDURE — 85730 THROMBOPLASTIN TIME PARTIAL: CPT | Performed by: SURGERY

## 2024-04-26 PROCEDURE — 2720000007 HC OR 272 NO HCPCS: Performed by: SURGERY

## 2024-04-26 PROCEDURE — 36415 COLL VENOUS BLD VENIPUNCTURE: CPT | Performed by: SURGERY

## 2024-04-26 PROCEDURE — 85610 PROTHROMBIN TIME: CPT | Performed by: SURGERY

## 2024-04-26 PROCEDURE — 84075 ASSAY ALKALINE PHOSPHATASE: CPT | Performed by: SURGERY

## 2024-04-26 PROCEDURE — 2500000005 HC RX 250 GENERAL PHARMACY W/O HCPCS: Performed by: SURGERY

## 2024-04-26 PROCEDURE — 7100000001 HC RECOVERY ROOM TIME - INITIAL BASE CHARGE: Performed by: SURGERY

## 2024-04-26 PROCEDURE — 0DNW4ZZ RELEASE PERITONEUM, PERCUTANEOUS ENDOSCOPIC APPROACH: ICD-10-PCS | Performed by: SURGERY

## 2024-04-26 PROCEDURE — 83690 ASSAY OF LIPASE: CPT | Performed by: SURGERY

## 2024-04-26 PROCEDURE — 86850 RBC ANTIBODY SCREEN: CPT | Mod: 91 | Performed by: SURGERY

## 2024-04-26 PROCEDURE — 3700000002 HC GENERAL ANESTHESIA TIME - EACH INCREMENTAL 1 MINUTE: Performed by: SURGERY

## 2024-04-26 PROCEDURE — 1200000002 HC GENERAL ROOM WITH TELEMETRY DAILY

## 2024-04-26 PROCEDURE — 2500000004 HC RX 250 GENERAL PHARMACY W/ HCPCS (ALT 636 FOR OP/ED): Performed by: LICENSED PRACTICAL NURSE

## 2024-04-26 PROCEDURE — 84100 ASSAY OF PHOSPHORUS: CPT | Performed by: SURGERY

## 2024-04-26 PROCEDURE — 2500000004 HC RX 250 GENERAL PHARMACY W/ HCPCS (ALT 636 FOR OP/ED): Performed by: STUDENT IN AN ORGANIZED HEALTH CARE EDUCATION/TRAINING PROGRAM

## 2024-04-26 PROCEDURE — 99233 SBSQ HOSP IP/OBS HIGH 50: CPT | Performed by: INTERNAL MEDICINE

## 2024-04-26 PROCEDURE — 2500000002 HC RX 250 W HCPCS SELF ADMINISTERED DRUGS (ALT 637 FOR MEDICARE OP, ALT 636 FOR OP/ED): Performed by: INTERNAL MEDICINE

## 2024-04-26 PROCEDURE — 3700000001 HC GENERAL ANESTHESIA TIME - INITIAL BASE CHARGE: Performed by: SURGERY

## 2024-04-26 PROCEDURE — 86922 COMPATIBILITY TEST ANTIGLOB: CPT

## 2024-04-26 PROCEDURE — 0F9430Z DRAINAGE OF GALLBLADDER WITH DRAINAGE DEVICE, PERCUTANEOUS APPROACH: ICD-10-PCS | Performed by: RADIOLOGY

## 2024-04-26 PROCEDURE — 99223 1ST HOSP IP/OBS HIGH 75: CPT | Performed by: STUDENT IN AN ORGANIZED HEALTH CARE EDUCATION/TRAINING PROGRAM

## 2024-04-26 PROCEDURE — 2500000005 HC RX 250 GENERAL PHARMACY W/O HCPCS: Performed by: NURSE ANESTHETIST, CERTIFIED REGISTERED

## 2024-04-26 PROCEDURE — 2500000004 HC RX 250 GENERAL PHARMACY W/ HCPCS (ALT 636 FOR OP/ED): Performed by: SURGERY

## 2024-04-26 PROCEDURE — 85025 COMPLETE CBC W/AUTO DIFF WBC: CPT | Performed by: SURGERY

## 2024-04-26 PROCEDURE — 7100000002 HC RECOVERY ROOM TIME - EACH INCREMENTAL 1 MINUTE: Performed by: SURGERY

## 2024-04-26 PROCEDURE — 2500000005 HC RX 250 GENERAL PHARMACY W/O HCPCS: Performed by: LICENSED PRACTICAL NURSE

## 2024-04-26 PROCEDURE — 3600000008 HC OR TIME - EACH INCREMENTAL 1 MINUTE - PROCEDURE LEVEL THREE: Performed by: SURGERY

## 2024-04-26 RX ORDER — HYDROMORPHONE HYDROCHLORIDE 2 MG/1
2 TABLET ORAL EVERY 6 HOURS PRN
Status: DISCONTINUED | OUTPATIENT
Start: 2024-04-26 | End: 2024-04-30 | Stop reason: HOSPADM

## 2024-04-26 RX ORDER — ROCURONIUM BROMIDE 50 MG/5 ML
SYRINGE (ML) INTRAVENOUS AS NEEDED
Status: DISCONTINUED | OUTPATIENT
Start: 2024-04-26 | End: 2024-04-26

## 2024-04-26 RX ORDER — ACETAMINOPHEN 325 MG/1
650 TABLET ORAL EVERY 8 HOURS
Status: DISCONTINUED | OUTPATIENT
Start: 2024-04-26 | End: 2024-04-30 | Stop reason: HOSPADM

## 2024-04-26 RX ORDER — MORPHINE SULFATE 2 MG/ML
2 INJECTION, SOLUTION INTRAMUSCULAR; INTRAVENOUS EVERY 5 MIN PRN
Status: DISCONTINUED | OUTPATIENT
Start: 2024-04-26 | End: 2024-04-26 | Stop reason: HOSPADM

## 2024-04-26 RX ORDER — BUPIVACAINE HYDROCHLORIDE AND EPINEPHRINE 5; 5 MG/ML; UG/ML
INJECTION, SOLUTION EPIDURAL; INTRACAUDAL; PERINEURAL AS NEEDED
Status: DISCONTINUED | OUTPATIENT
Start: 2024-04-26 | End: 2024-04-26 | Stop reason: HOSPADM

## 2024-04-26 RX ORDER — HYDRALAZINE HYDROCHLORIDE 20 MG/ML
5 INJECTION INTRAMUSCULAR; INTRAVENOUS EVERY 30 MIN PRN
Status: DISCONTINUED | OUTPATIENT
Start: 2024-04-26 | End: 2024-04-26 | Stop reason: HOSPADM

## 2024-04-26 RX ORDER — LIDOCAINE 560 MG/1
1 PATCH PERCUTANEOUS; TOPICAL; TRANSDERMAL DAILY
Status: DISCONTINUED | OUTPATIENT
Start: 2024-04-26 | End: 2024-04-30 | Stop reason: HOSPADM

## 2024-04-26 RX ORDER — SODIUM CHLORIDE, SODIUM LACTATE, POTASSIUM CHLORIDE, CALCIUM CHLORIDE 600; 310; 30; 20 MG/100ML; MG/100ML; MG/100ML; MG/100ML
100 INJECTION, SOLUTION INTRAVENOUS CONTINUOUS
Status: DISCONTINUED | OUTPATIENT
Start: 2024-04-26 | End: 2024-04-26 | Stop reason: HOSPADM

## 2024-04-26 RX ORDER — LIDOCAINE HYDROCHLORIDE 10 MG/ML
0.1 INJECTION, SOLUTION EPIDURAL; INFILTRATION; INTRACAUDAL; PERINEURAL ONCE
Status: DISCONTINUED | OUTPATIENT
Start: 2024-04-26 | End: 2024-04-26 | Stop reason: HOSPADM

## 2024-04-26 RX ORDER — HYDROMORPHONE HYDROCHLORIDE 2 MG/1
1 TABLET ORAL EVERY 6 HOURS PRN
Status: DISCONTINUED | OUTPATIENT
Start: 2024-04-26 | End: 2024-04-30 | Stop reason: HOSPADM

## 2024-04-26 RX ORDER — DIPHENHYDRAMINE HCL 12.5MG/5ML
12.5 LIQUID (ML) ORAL EVERY 8 HOURS PRN
Status: DISCONTINUED | OUTPATIENT
Start: 2024-04-26 | End: 2024-04-30 | Stop reason: HOSPADM

## 2024-04-26 RX ORDER — DROPERIDOL 2.5 MG/ML
0.62 INJECTION, SOLUTION INTRAMUSCULAR; INTRAVENOUS ONCE AS NEEDED
Status: DISCONTINUED | OUTPATIENT
Start: 2024-04-26 | End: 2024-04-26 | Stop reason: HOSPADM

## 2024-04-26 RX ORDER — SODIUM CHLORIDE 9 MG/ML
50 INJECTION, SOLUTION INTRAVENOUS CONTINUOUS
Status: DISCONTINUED | OUTPATIENT
Start: 2024-04-26 | End: 2024-04-26

## 2024-04-26 RX ORDER — LABETALOL HYDROCHLORIDE 5 MG/ML
5 INJECTION, SOLUTION INTRAVENOUS ONCE AS NEEDED
Status: DISCONTINUED | OUTPATIENT
Start: 2024-04-26 | End: 2024-04-26 | Stop reason: HOSPADM

## 2024-04-26 RX ORDER — PROPOFOL 10 MG/ML
INJECTION, EMULSION INTRAVENOUS AS NEEDED
Status: DISCONTINUED | OUTPATIENT
Start: 2024-04-26 | End: 2024-04-26

## 2024-04-26 RX ORDER — FENTANYL CITRATE 50 UG/ML
INJECTION, SOLUTION INTRAMUSCULAR; INTRAVENOUS AS NEEDED
Status: DISCONTINUED | OUTPATIENT
Start: 2024-04-26 | End: 2024-04-26

## 2024-04-26 RX ORDER — LIDOCAINE HCL/PF 100 MG/5ML
SYRINGE (ML) INTRAVENOUS AS NEEDED
Status: DISCONTINUED | OUTPATIENT
Start: 2024-04-26 | End: 2024-04-26

## 2024-04-26 RX ORDER — PHENYLEPHRINE HYDROCHLORIDE 10 MG/ML
INJECTION INTRAVENOUS AS NEEDED
Status: DISCONTINUED | OUTPATIENT
Start: 2024-04-26 | End: 2024-04-26

## 2024-04-26 RX ORDER — ALBUTEROL SULFATE 0.83 MG/ML
2.5 SOLUTION RESPIRATORY (INHALATION) ONCE AS NEEDED
Status: DISCONTINUED | OUTPATIENT
Start: 2024-04-26 | End: 2024-04-26 | Stop reason: HOSPADM

## 2024-04-26 RX ORDER — FAMOTIDINE 10 MG/ML
20 INJECTION INTRAVENOUS ONCE
Status: COMPLETED | OUTPATIENT
Start: 2024-04-26 | End: 2024-04-26

## 2024-04-26 RX ADMIN — CIPROFLOXACIN 400 MG: 400 INJECTION, SOLUTION INTRAVENOUS at 18:27

## 2024-04-26 RX ADMIN — METRONIDAZOLE 500 MG: 500 INJECTION, SOLUTION INTRAVENOUS at 12:48

## 2024-04-26 RX ADMIN — METRONIDAZOLE 500 MG: 500 INJECTION, SOLUTION INTRAVENOUS at 01:06

## 2024-04-26 RX ADMIN — METRONIDAZOLE 500 MG: 500 INJECTION, SOLUTION INTRAVENOUS at 23:30

## 2024-04-26 RX ADMIN — METOPROLOL TARTRATE 25 MG: 25 TABLET, FILM COATED ORAL at 20:27

## 2024-04-26 RX ADMIN — FAMOTIDINE 20 MG: 10 INJECTION, SOLUTION INTRAVENOUS at 12:14

## 2024-04-26 RX ADMIN — ONDANSETRON 4 MG: 2 INJECTION INTRAMUSCULAR; INTRAVENOUS at 13:28

## 2024-04-26 RX ADMIN — HEPARIN SODIUM 5000 UNITS: 5000 INJECTION INTRAVENOUS; SUBCUTANEOUS at 20:27

## 2024-04-26 RX ADMIN — PREGABALIN 75 MG: 75 CAPSULE ORAL at 20:27

## 2024-04-26 RX ADMIN — PRAVASTATIN SODIUM 40 MG: 40 TABLET ORAL at 20:27

## 2024-04-26 RX ADMIN — HYDROMORPHONE HYDROCHLORIDE 0.2 MG: 0.2 INJECTION, SOLUTION INTRAMUSCULAR; INTRAVENOUS; SUBCUTANEOUS at 20:35

## 2024-04-26 RX ADMIN — PHENYLEPHRINE HYDROCHLORIDE 100 MCG: 10 INJECTION INTRAVENOUS at 13:12

## 2024-04-26 RX ADMIN — ACETAMINOPHEN 650 MG: 325 TABLET ORAL at 23:30

## 2024-04-26 RX ADMIN — Medication 40 MG: at 13:01

## 2024-04-26 RX ADMIN — METRONIDAZOLE 500 MG: 500 INJECTION, SOLUTION INTRAVENOUS at 16:07

## 2024-04-26 RX ADMIN — DIPHENHYDRAMINE HYDROCHLORIDE 12.5 MG: 25 SOLUTION ORAL at 20:27

## 2024-04-26 RX ADMIN — DEXAMETHASONE SODIUM PHOSPHATE 8 MG: 4 INJECTION INTRA-ARTICULAR; INTRALESIONAL; INTRAMUSCULAR; INTRAVENOUS; SOFT TISSUE at 13:03

## 2024-04-26 RX ADMIN — PROPOFOL 120 MG: 10 INJECTION, EMULSION INTRAVENOUS at 13:01

## 2024-04-26 RX ADMIN — FENTANYL CITRATE 50 MCG: 50 INJECTION INTRAMUSCULAR; INTRAVENOUS at 13:01

## 2024-04-26 RX ADMIN — LIDOCAINE HYDROCHLORIDE 50 MG: 20 INJECTION, SOLUTION INTRAVENOUS at 13:01

## 2024-04-26 RX ADMIN — HYDROMORPHONE HYDROCHLORIDE 0.5 MG: 1 INJECTION, SOLUTION INTRAMUSCULAR; INTRAVENOUS; SUBCUTANEOUS at 14:45

## 2024-04-26 RX ADMIN — HYDROMORPHONE HYDROCHLORIDE 1 MG: 2 TABLET ORAL at 18:23

## 2024-04-26 RX ADMIN — HYDROMORPHONE HYDROCHLORIDE 0.2 MG: 0.2 INJECTION, SOLUTION INTRAMUSCULAR; INTRAVENOUS; SUBCUTANEOUS at 03:41

## 2024-04-26 RX ADMIN — SODIUM CHLORIDE 50 ML/HR: 9 INJECTION, SOLUTION INTRAVENOUS at 12:14

## 2024-04-26 RX ADMIN — HYDROMORPHONE HYDROCHLORIDE 0.2 MG: 0.2 INJECTION, SOLUTION INTRAMUSCULAR; INTRAVENOUS; SUBCUTANEOUS at 16:25

## 2024-04-26 RX ADMIN — FENTANYL CITRATE 50 MCG: 50 INJECTION INTRAMUSCULAR; INTRAVENOUS at 14:16

## 2024-04-26 RX ADMIN — SUGAMMADEX 200 MG: 100 INJECTION, SOLUTION INTRAVENOUS at 14:24

## 2024-04-26 SDOH — HEALTH STABILITY: MENTAL HEALTH: CURRENT SMOKER: 0

## 2024-04-26 ASSESSMENT — COLUMBIA-SUICIDE SEVERITY RATING SCALE - C-SSRS
1. IN THE PAST MONTH, HAVE YOU WISHED YOU WERE DEAD OR WISHED YOU COULD GO TO SLEEP AND NOT WAKE UP?: NO
2. HAVE YOU ACTUALLY HAD ANY THOUGHTS OF KILLING YOURSELF?: NO
6. HAVE YOU EVER DONE ANYTHING, STARTED TO DO ANYTHING, OR PREPARED TO DO ANYTHING TO END YOUR LIFE?: NO

## 2024-04-26 ASSESSMENT — COGNITIVE AND FUNCTIONAL STATUS - GENERAL
MOBILITY SCORE: 16
WALKING IN HOSPITAL ROOM: A LITTLE
DAILY ACTIVITIY SCORE: 18
DAILY ACTIVITIY SCORE: 18
WALKING IN HOSPITAL ROOM: A LITTLE
MOVING TO AND FROM BED TO CHAIR: A LITTLE
MOVING FROM LYING ON BACK TO SITTING ON SIDE OF FLAT BED WITH BEDRAILS: A LITTLE
MOBILITY SCORE: 16
TOILETING: A LITTLE
STANDING UP FROM CHAIR USING ARMS: A LITTLE
HELP NEEDED FOR BATHING: A LITTLE
STANDING UP FROM CHAIR USING ARMS: A LITTLE
TURNING FROM BACK TO SIDE WHILE IN FLAT BAD: A LITTLE
DRESSING REGULAR UPPER BODY CLOTHING: A LITTLE
DRESSING REGULAR LOWER BODY CLOTHING: A LITTLE
MOVING FROM LYING ON BACK TO SITTING ON SIDE OF FLAT BED WITH BEDRAILS: A LITTLE
PERSONAL GROOMING: A LITTLE
STANDING UP FROM CHAIR USING ARMS: A LITTLE
CLIMB 3 TO 5 STEPS WITH RAILING: TOTAL
DRESSING REGULAR UPPER BODY CLOTHING: A LITTLE
CLIMB 3 TO 5 STEPS WITH RAILING: TOTAL
HELP NEEDED FOR BATHING: A LITTLE
MOVING TO AND FROM BED TO CHAIR: A LITTLE
CLIMB 3 TO 5 STEPS WITH RAILING: TOTAL
MOVING FROM LYING ON BACK TO SITTING ON SIDE OF FLAT BED WITH BEDRAILS: A LITTLE
WALKING IN HOSPITAL ROOM: A LITTLE
TOILETING: A LITTLE
TURNING FROM BACK TO SIDE WHILE IN FLAT BAD: A LITTLE
MOVING TO AND FROM BED TO CHAIR: A LITTLE
DRESSING REGULAR LOWER BODY CLOTHING: A LITTLE
EATING MEALS: A LITTLE
TURNING FROM BACK TO SIDE WHILE IN FLAT BAD: A LITTLE
MOBILITY SCORE: 16
EATING MEALS: A LITTLE
PERSONAL GROOMING: A LITTLE

## 2024-04-26 ASSESSMENT — PAIN SCALES - GENERAL
PAINLEVEL_OUTOF10: 3
PAINLEVEL_OUTOF10: 0 - NO PAIN
PAINLEVEL_OUTOF10: 8
PAINLEVEL_OUTOF10: 7
PAINLEVEL_OUTOF10: 8
PAINLEVEL_OUTOF10: 0 - NO PAIN
PAINLEVEL_OUTOF10: 4
PAINLEVEL_OUTOF10: 6
PAINLEVEL_OUTOF10: 4
PAINLEVEL_OUTOF10: 7
PAIN_LEVEL: 0
PAINLEVEL_OUTOF10: 8
PAINLEVEL_OUTOF10: 0 - NO PAIN
PAINLEVEL_OUTOF10: 4

## 2024-04-26 ASSESSMENT — PAIN - FUNCTIONAL ASSESSMENT
PAIN_FUNCTIONAL_ASSESSMENT: 0-10

## 2024-04-26 ASSESSMENT — PAIN DESCRIPTION - DESCRIPTORS
DESCRIPTORS: ACHING
DESCRIPTORS: ACHING

## 2024-04-26 ASSESSMENT — PAIN DESCRIPTION - LOCATION: LOCATION: ABDOMEN

## 2024-04-26 NOTE — PROGRESS NOTES
Physical Therapy    Physical Therapy Treatment    Patient Name: Tana Hargrove  MRN: [id number]  Today's Date: 4/26/2024  Time Calculation  Start Time: 0726  Stop Time: 0755  Time Calculation (min): 29 min       Assessment/Plan   PT Assessment  End of Session Communication: Bedside nurse  Assessment Comment: pt is making good progress toward all goals and will benefit from further skilled PT services.  End of Session Patient Position: Alarm on, Bed, 3 rail up     PT Plan  Treatment/Interventions: Bed mobility, Transfer training, Gait training, Balance training, Strengthening, Endurance training, Therapeutic exercise, Therapeutic activity, Home exercise program  PT Plan: Skilled PT  PT Frequency: 3 times per week  PT Discharge Recommendations: Low intensity level of continued care (May benefit from additional family support at home prn at least initially.)  PT - OK to Discharge: Yes (when medically cleared)      General Visit Information:   PT  Visit  PT Received On: 04/26/24  Response to Previous Treatment: Patient with no complaints from previous session.  General  Prior to Session Communication: Bedside nurse  Patient Position Received: Bed, 3 rail up, Alarm on  Preferred Learning Style: verbal  General Comment: pt in bed and agreeable to treatment. pt reporting she is having dialysis and gal bladder surgery today.      Precautions:  Precautions  Medical Precautions: Fall precautions  Precautions Comment: falls         Pain:  Pain Assessment  Pain Assessment: 0-10  Pain Score:  (pt c/o pain in her galbladder but did not rate.)  Cognition:WFL        Treatments:  Therapeutic Exercise  Therapeutic Exercise Performed: Yes (BLE strengthening exercises seated with cues for improved technique.)  Therapeutic Exercise Activity 1: ankle pumps x 20  Therapeutic Exercise Activity 2: glut sets x 20  Therapeutic Exercise Activity 3: Marches x 20  Therapeutic Exercise Activity 4: LAQ x20  Therapeutic Exercise Activity 5: Hip  ab/adduction x20    Bed Mobility  Bed Mobility: Yes  Bed Mobility 1  Bed Mobility 1: Supine to sitting, Sitting to supine  Level of Assistance 1: Minimum assistance  Bed Mobility Comments 1: cues for improved technique and safety. pt with assist to get LES into bed and to transition trunk to sitting EOB,    Ambulation/Gait Training  Ambulation/Gait Training Performed: Yes  Ambulation/Gait Training 1  Surface 1: Level tile  Device 1: Rolling walker  Assistance 1: Contact guard  Quality of Gait 1: Diminished heel strike, Decreased step length  Comments/Distance (ft) 1: 75'x2 and 20'x1 with cues for posture and safety with turns.  Transfers  Transfer: Yes  Transfer 1  Technique 1: Sit to stand, Stand to sit  Transfer Device 1: Walker  Transfer Level of Assistance 1: Contact guard  Trials/Comments 1: cues for proper hand placement and safety.  Transfers 2  Technique 2: Stand pivot  Transfer Device 2: Walker  Transfer Level of Assistance 2: Contact guard  Trials/Comments 2: pt with cues for walker safety    Outcome Measures:  [facility] Basic Mobility  Turning from your back to your side while in a flat bed without using bedrails: A little  Moving from lying on your back to sitting on the side of a flat bed without using bedrails: A little  Moving to and from bed to chair (including a wheelchair): A little  Standing up from a chair using your arms (e.g. wheelchair or bedside chair): A little  To walk in hospital room: A little  Climbing 3-5 steps with railing: Total  Basic Mobility - Total Score: 16    Education Documentation  Precautions, taught by Radha Etienne PTA at 4/26/2024  8:01 AM.  Learner: Patient  Readiness: Acceptance  Method: Explanation  Response: Verbalizes Understanding, Needs Reinforcement    Mobility Training, taught by Radha Etienne PTA at 4/26/2024  8:01 AM.  Learner: Patient  Readiness: Acceptance  Method: Explanation  Response: Verbalizes Understanding, Needs Reinforcement    Education Comments  No  comments found.               Encounter Problems       Encounter Problems (Active)       PT Problem       transfers (Progressing)       Start:  04/24/24    Expected End:  04/29/24       Patient will perform all transfers with SBA x1          gait (Progressing)       Start:  04/24/24    Expected End:  04/29/24       Patient will amb 100+ feet with SBA x1          strengthening  (Progressing)       Start:  04/24/24    Expected End:  04/29/24       Patient will perform 20+ reps of AROM/RROM for DIGNA LE's to improve safety and functional independence              Pain - Adult

## 2024-04-26 NOTE — BRIEF OP NOTE
Date: 2024  OR Location: [facility]    Name: Tana Hargrove, : 1945, Age: 79 y.o., MRN: [id number], Sex: female    Diagnosis  Pre-op Diagnosis     * Chronic cholecystitis [K81.1] Post-op Diagnosis     * Chronic cholecystitis [K81.1]     Procedures  Diagnostic Laparoscopy, lysis of adhesions        Surgeons      * Ronald Grimes - Primary    Resident/Fellow/Other Assistant:  Surgeons and Role:  * No surgeons found with a matching role *  Katherin Weller SA    Procedure Summary  Anesthesia: General  ASA: III  Anesthesia Staff: CRNA: Patrizia Rodriguez, APRN-CRNA; Kristen Head, APRN-CRNA  Estimated Blood Loss: 50mL  Intra-op Medications:   Administrations occurring from 1300 to 1530 on 24:   Medication Name Total Dose   BUPivacaine-EPINEPHrine (PF) (Marcaine w/EPI) 0.5 %-1:200,000 injection 30 mL   HYDROmorphone PF (Dilaudid) injection 0.2 mg Cannot be calculated   sodium chloride 0.9% infusion Cannot be calculated              Anesthesia Record               Intraprocedure I/O Totals          Intake    sodium chloride 0.9% infusion 600.00 mL    Total Intake 600 mL          Specimen: No specimens collected     Staff:   Circulator: Jayy Howell RN  Scrub Person: Anabella Quiros; Amanda Pickard          Findings:   Adhesions between liver and anterior abdominal wall  Presumed GB encased in omental adhesions to liver and duodenum (+colon?)  In trying to free up adhesions from liver, got into bleeding - eventually controlled with surgicel and manual tamponade.   Procedure aborted    Complications:  None; patient tolerated the procedure well.     Disposition: PACU - hemodynamically stable.  Condition: stable  Specimens Collected: No specimens collected  Attending Attestation: I was present and scrubbed for the entire procedure.    Ronald Grimes  Phone Number: [phone]

## 2024-04-26 NOTE — ANESTHESIA POSTPROCEDURE EVALUATION
Patient: Tana Hargrove    Procedure Summary       Date: 04/26/24 Room / Location: [facility] 01 / [facility]    Anesthesia Start: 1254 Anesthesia Stop: 1441    Procedure: Diagnostic Laparoscopy, lysis of adhesions Diagnosis:       Chronic cholecystitis      (Chronic cholecystitis [K81.1])    Surgeons: Ronald Grimes MD Responsible Provider: JAH Whitaker    Anesthesia Type: general ASA Status: 3            Anesthesia Type: general    Vitals Value Taken Time   /51 04/26/24 1501   Temp 36.2 °C (97.1 °F) 04/26/24 1437   Pulse 78 04/26/24 1507   Resp 14 04/26/24 1507   SpO2 100 % 04/26/24 1507   Vitals shown include unfiled device data.    Anesthesia Post Evaluation    Patient location during evaluation: PACU  Patient participation: complete - patient participated  Level of consciousness: awake  Pain score: 0  Pain management: adequate  Airway patency: patent  Cardiovascular status: acceptable  Respiratory status: acceptable  Hydration status: acceptable  Postoperative Nausea and Vomiting: none    No notable events documented.

## 2024-04-26 NOTE — ANESTHESIA PROCEDURE NOTES
Airway  Date/Time: 4/26/2024 1:04 PM  Urgency: elective    Airway not difficult    Staffing  Performed: CRNA   Authorized by: JAH Walters    Performed by: JAH Whitaker  Patient location during procedure: OR    Indications and Patient Condition  Indications for airway management: anesthesia and airway protection  Spontaneous ventilation: present  Sedation level: deep  Preoxygenated: yes  Patient position: sniffing  Mask difficulty assessment: 1 - vent by mask    Final Airway Details  Final airway type: endotracheal airway      Successful airway: ETT  Cuffed: yes   Successful intubation technique: direct laryngoscopy  Facilitating devices/methods: intubating stylet  Endotracheal tube insertion site: oral  Blade: Juvencio  Blade size: #4  ETT size (mm): 7.0  Cormack-Lehane Classification: grade I - full view of glottis  Placement verified by: capnometry   Cuff volume (mL): 6  Measured from: lips  Number of attempts at approach: 1

## 2024-04-26 NOTE — PROGRESS NOTES
Referral received for care management services. Patient currently inpatient. Will outreach to patient to assess needs and provide support.

## 2024-04-26 NOTE — PROGRESS NOTES
Occupational Therapy                 Therapy Communication Note    Patient Name: Tana Hargrove  MRN: [id number]  Today's Date: 4/26/2024     Discipline: Occupational Therapy    Missed Visit Reason: Missed Visit Reason: Patient in a medical procedure (Pt at dialysis at this time. Per pt going for procedure this afternoon.)    Missed Time: Attempt    Comment:

## 2024-04-26 NOTE — INTERVAL H&P NOTE
H&P reviewed.   The patient has since developed acute cholecystitis confirmed on HIDA.    Patient consents to lap poss open cholecystectomy.   Cardiology recommendations and note reviewed.    Patient understands risks of surgery - not limited to bleeding, damage to surrounding organs, infection, cardiac arrest, respiratory failure, death.  Wishes to proceed.

## 2024-04-26 NOTE — NURSING NOTE
Report to Receiving RN:    Report To: Riley  Time Report Called:1207  Hand-Off Communication: Mrs. Meléndez completed her 3 hour treatment, removing 1 leader. Dr. Avelar and Cardiology both saw her. Surgery came up to get her and her family and went down to surgery   Complications During Treatment: No,   Ultrafiltration Treatment: No  Medications Administered During Dialysis: No  Blood Products Administered During Dialysis: No  Labs Sent During Dialysis: No  Heparin Drip Rate Changes: No  Dialysis Catheter Dressing: clean and dry  Last Dressing Change: 4/24/2024    Electronic Signatures:  Александр Chen RN       Last Updated: 12:07 PM by АЛЕКСАНДР LAMBERT

## 2024-04-26 NOTE — OP NOTE
Diagnostic Laparoscopy, lysis of adhesions Operative Note     Date: 2024  OR Location: [facility]    Name: Tana Hargrove, : 1945, Age: 79 y.o., MRN: [id number], Sex: female    Diagnosis  Pre-op Diagnosis     * Chronic cholecystitis [K81.1] Post-op Diagnosis     * Chronic cholecystitis [K81.1]     Procedures  Diagnostic laparoscopy  Lysis of adhesions      Surgeons      * Ronald Grimes - Primary    Resident/Fellow/Other Assistant:  Surgeons and Role: Katherin Weller SA; Dr.Hannah Schwartz  * No surgeons found with a matching role *    Procedure Summary  Anesthesia: General  ASA: III  Anesthesia Staff: CRNA: Patrizia Rodriguez, APRN-CRNA; Kristen Head APRN-CRNA  Estimated Blood Loss: 50mL  Intra-op Medications:   Administrations occurring from 1300 to 1530 on 24:   Medication Name Total Dose   BUPivacaine-EPINEPHrine (PF) (Marcaine w/EPI) 0.5 %-1:200,000 injection 30 mL   HYDROmorphone (Dilaudid) injection 0.5 mg 0.5 mg   sodium chloride 0.9% infusion Cannot be calculated              Anesthesia Record               Intraprocedure I/O Totals          Intake    sodium chloride 0.9% infusion 600.00 mL    Total Intake 600 mL          Specimen: No specimens collected     Staff:   Circulator: Jayy Howell RN  Scrub Person: Anabella Quiros; Amanda Pickard        Findings:   Adhesions between liver and anterior abdominal wall  Presumed GB encased in omental adhesions to liver and duodenum (+colon?)  In trying to free up adhesions from liver, got into bleeding - eventually controlled with surgicel and manual tamponade.   Procedure aborted    Indications: Tana Hargrove is an 79 y.o. female who is having surgery for Chronic cholecystitis [K81.1].     The patient was seen in the preoperative area. The risks, benefits, complications, treatment options, non-operative alternatives, expected recovery and outcomes were discussed with the patient. The possibilities of reaction to medication, pulmonary aspiration,  injury to surrounding structures, bleeding, recurrent infection, the need for additional procedures, failure to diagnose a condition, and creating a complication requiring transfusion or operation were discussed with the patient. The patient concurred with the proposed plan, giving informed consent.  The site of surgery was properly noted/marked if necessary per policy. The patient has been actively warmed in preoperative area. Preoperative antibiotics have been ordered and given within 1 hours of incision. Venous thrombosis prophylaxis have been ordered including bilateral sequential compression devices    Procedure Details:   The patient was brought to the operating room and placed supine on the operating table.  Sequential compression devices were put on both lower extremities.  Arms were left untucked by the side of the patient.  IV antibiotics were infused.  An informal huddle was performed verifying the correct patient and procedures to be performed.  General endotracheal anaesthesia was induced.  The abdomen was prepped and draped in a sterile fashion. A formal timeout was performed.     Local anaesthetic agent was infiltrated intradermally just above the umbilicus.  An incision was made in this supraumbilical region.  This was deepened down to the fascia which was then grasped with Kocher clamps.  A vertical incision was made through the fascia and entry into the abdominal cavity was confirmed with a finger sweep and visual confirmation.  Two #0 vicryl stay sutures were placed on either aspects of the fascial incision.  The Oro port was inserted.  The abdomen was then insufflated to approximately 12-15 mmHg.  The camera and laparoscope were introduced through the Taran port.  The abdomen was then scanned - there no injuries noted on insertion of the port.  Three accessory ports were placed under direct visualisation: a 12mm port in the subxiphoid region, a 5mm port in the midclavicular line and inferior  to the subcostal margin, and an additional 5mm port in the midaxillary line inferior to the subcostal margin.  The patient was then placed in reverse Trendelenburg position and rolled to the left.     On inspection, there were multiple adhesions between the liver and parietal peritoneum.  This was taken down sharply with electrocautery.  The omentum was plastered up against the liver with no visible gallbladder wall between the two structures.  I tried freeing up some the omental adhesions and got into bleeding.  This was stopped with manual pressure and electrocautery.  I eventually placed surgicel in the area of bleeding.  There was still no discernible gallbladder wall which I could identify.  In trying to free up the lateral and medial edges, I encountered the same problem as everything was densely stuck to the liver edge.      At this point, I called  in for help.  She assisted in trying to free up the gallbladder from the medial aspect by following the stomach and duodenal curve but encountered difficulty in trying to differentiate gallbladder wall from stomach.  The only other option would be to perform an open cholecystectomy.  However, w\ith the risk of causing further bleeding and with the morbidity of an open procedure in an elderly patient, I decided to abort the procedure.      The area of bleeding from the omentum had stopped.  All other areas of adhesions which were freed up remained dry with no active bleeding.  Each of the ports was removed under direct visualisation. The abdomen was desufflated. The fascial defect was then closed with the stay sutures.  All skin incisions were then closed with #4-0 monocryl.  Dermabond was placed on all incisions.  The patient was successfully extubated and brought to the PACU in stable condition.  All needles, instruments, and sponges were correct to the count.        Complications:  None; patient tolerated the procedure well.    Disposition: PACU -  hemodynamically stable.  Condition: stable         Attending Attestation:     Ronald Grimes  Phone Number: [phone]

## 2024-04-26 NOTE — CONSULTS
Inpatient consult to Cardiology  Consult performed by: Gabe Spear MD  Consult ordered by: Naga Carr MD  Reason for consult: preop risk stratificaiton  Assessment/Recommendations: See full note         History Of Present Illness:    Tana Hargrove is a 79 y.o. female with past medical history of paroxysmal atrial fibrillation not currently on anticoagulation given her history of myelodysplastic syndrome and associated anemia requiring blood transfusions, chronic diastolic heart failure, presumed coronary artery disease, hypertension, dyslipidemia, ESRD on hemodialysis via permacath, diabetes mellitus, who presented to the hospital due to mechanical fall.  Patient reportedly had trauma to the head and required blood transfusion due to scalp hematoma.  Patient presented to hospital due to fall.  She has declined her hemoglobin at this admission to 4.8 from 7. Patient was transfused 2 PRBC patient had hemoglobin recovery up to 7.8 but then continues to decline now.  Most recent hemoglobin of 7.1.  Patient was also diagnosed with acute cholecystitis being planned for surgery.  Cardiology consultation was requested for perioperative risk stratification.    Patient denies any chest discomfort at rest.  Patient is able to do some activities of daily living including cooking her food and moving around in the house.  She denies any chest discomfort or activity limitation.  She denies any shortness of breath.  She denies any orthopnea.  She denies any edema.    EKG-normal sinus rhythm, nonspecific T wave changes, baseline artifact.    Recent echocardiogram was reviewed with preserved ejection fraction.  Previous stress test in 2022 was unremarkable for ischemia.       Last Recorded Vitals:  Vitals:    04/25/24 2100 04/26/24 0028 04/26/24 0518 04/26/24 0917   BP: 152/68 164/71 123/69    BP Location: Left arm Left arm Left arm    Patient Position: Lying Lying Lying    Pulse: 74 59 57 71   Resp: 16 16 15     Temp: 37.2 °C (99 °F) 36.6 °C (97.9 °F) 36.1 °C (96.9 °F) 36.4 °C (97.5 °F)   TempSrc: Temporal Temporal Temporal Temporal   SpO2: 93% 95% 93%    Weight:       Height:           Last Labs:  CBC - 4/26/2024:  3:43 AM  5.0 7.1 193    22.2      CMP - 4/26/2024:  3:43 AM  9.0 5.4 14 --- 0.6   4.3 3.2 12 95      PTT - 4/26/2024:  3:43 AM  1.1   12.6 29     Troponin I, High Sensitivity   Date/Time Value Ref Range Status   04/22/2024 04:01 PM 8 0 - 13 ng/L Final   04/13/2024 02:29 AM 16 (H) 0 - 13 ng/L Final   04/13/2024 01:33 AM 14 (H) 0 - 13 ng/L Final     BNP   Date/Time Value Ref Range Status   04/22/2024 04:01  (H) 0 - 99 pg/mL Final   04/13/2024 01:33  (H) 0 - 99 pg/mL Final     Hemoglobin A1C   Date/Time Value Ref Range Status   03/14/2024 06:28 AM 6.3 (H) 4.3 - 5.6 % Final     Comment:     American Diabetes Association guidelines indicate that patients with HgbA1c in the range 5.7-6.4% are at increased risk for development of diabetes, and intervention by lifestyle modification may be beneficial. HgbA1c greater or equal to 6.5% is considered diagnostic of diabetes.   02/26/2024 09:46 AM 6.6 (H) see below % Final   09/26/2022 10:24 AM 5.9 (A) % Final     Comment:          Diagnosis of Diabetes-Adults   Non-Diabetic: < or = 5.6%   Increased risk for developing diabetes: 5.7-6.4%   Diagnostic of diabetes: > or = 6.5%  .       Monitoring of Diabetes                Age (y)     Therapeutic Goal (%)   Adults:          >18           <7.0   Pediatrics:    13-18           <7.5                   7-12           <8.0                   0- 6            7.5-8.5   American Diabetes Association. Diabetes Care 33(S1), Jan 20102010. 04/14/2022 02:10 PM 7.3 (A) % Final     Comment:          Diagnosis of Diabetes-Adults   Non-Diabetic: < or = 5.6%   Increased risk for developing diabetes: 5.7-6.4%   Diagnostic of diabetes: > or = 6.5%  .       Monitoring of Diabetes                Age (y)     Therapeutic Goal (%)   Adults:           >18           <7.0   Pediatrics:    13-18           <7.5                   7-12           <8.0                   0- 6            7.5-8.5   American Diabetes Association. Diabetes Care 33(S1), Jan 2010.     03/06/2021 01:52 AM 5.8 (A) % Final     Comment:     Normal less than 5.7%  Prediabetes 5.7% to 6.4%  Diabetes 6.5% or higher      --HgbA1C levels may not be accurate in patients who have  renal disease, received recent blood transfusions, are anemic,  or who have dyshemoglobinemia.     VLDL   Date/Time Value Ref Range Status   05/19/2023 11:17 AM 15 0 - 40 mg/dL Final   09/26/2022 10:24 AM 22 0 - 40 mg/dL Final   04/15/2022 10:27 AM 9 0 - 40 mg/dL Final      Last I/O:  I/O last 3 completed shifts:  In: 660 (9.7 mL/kg) [P.O.:360; IV Piggyback:300]  Out: 200 (2.9 mL/kg) [Urine:200 (0.1 mL/kg/hr)]  Weight: 68 kg     Past Cardiology Tests (Last 3 Years):  EKG:  ECG 12 lead 04/22/2024      ECG 12 lead 04/18/2024      Electrocardiogram, 12-lead PRN ACS symptoms 04/14/2024      ECG 12 lead 04/13/2024      ECG 12 lead 02/25/2024      ECG 12 lead 02/14/2024      ECG 12 lead 02/07/2024    Echo:  Transthoracic Echo (TTE) Complete 02/15/2024    PHYSICIAN INTERPRETATION:  Left Ventricle: Left ventricular systolic function is normal, with an estimated ejection fraction of 60%. There are no regional wall motion abnormalities. The left ventricular cavity size is normal. The left ventricular septal wall thickness is moderately increased. There is mildly increased left ventricular posterior wall thickness. Spectral Doppler shows an impaired relaxation pattern of left ventricular diastolic filling.  Left Atrium: The left atrium is moderately dilated.  Right Ventricle: The right ventricle is normal in size. There is normal right ventricular global systolic function.  Right Atrium: The right atrium is mildly dilated.  Aortic Valve: The aortic valve is trileaflet. There is no evidence of aortic valve stenosis.  There is no  evidence of aortic valve regurgitation.  Mitral Valve: The mitral valve is mildly thickened. There is mild mitral annular calcification. There is trace mitral valve regurgitation.  Tricuspid Valve: The tricuspid valve is structurally normal. There is mild to moderate tricuspid regurgitation. The Doppler estimated RVSP is mildly elevated at 38.8 mmHg.  Pulmonic Valve: The pulmonic valve is structurally normal. There is no indication of pulmonic valve regurgitation.  Pericardium: There is no pericardial effusion noted.  Aorta: The aortic root is normal.  Systemic Veins: The inferior vena cava appears to be of normal size.        CONCLUSIONS:   1. Left ventricular systolic function is normal with a 60% estimated ejection fraction.   2. Moderately increased left ventricular septal thickness.   3. Spectral Doppler shows an impaired relaxation pattern of left ventricular diastolic filling.   4. The left atrium is moderately dilated.   5. Mild to moderate tricuspid regurgitation.   6. Mildly elevated RVSP.   7. Aortic valve stenosis is not present.  Ejection Fractions:  EF   Date/Time Value Ref Range Status   02/15/2024 02:13 PM 67 %      Cath:  No results found for this or any previous visit from the past 1095 days.    Stress Test:  Nuclear Stress Test 04/08/2022  INDICATION:  CP .     COMPARISON:  None.     ACCESSION NUMBER(S):  [id number]; [id number]; [id number]     ORDERING CLINICIAN:  SHERIE MARINO     TECHNIQUE:  DIVISION OF NUCLEAR MEDICINE  PHARMACOLOGIC STRESS MYOCARDIAL PERFUSION SCAN, ONE DAY PROTOCOL     The patient received an intravenous dose of  11.6 mCi of Tc-99m  tetrofosmin and resting emission tomographic (SPECT) images of the  myocardium were acquired. The patient then received an intravenous  infusion of 0.4mg regadenoson (Lexiscan) followed by an additional  dose of  33.7 mCi of Tc-99m tetrofosmin. Stress phase SPECT images of  the myocardium were then acquired. These included ECG-gated images to  assess and  quantify ventricular function.     FINDINGS:  There is a small fixed perfusion defect in the mid  anterior/anteroseptal wall with associated wall motion abnormality  consistent with prior infarct. No reversible perfusion defects seen.     Calculated ejection fraction 50% mild hypokinesis of the mid anterior  wall.     IMPRESSION:  1. There is a small fixed perfusion defect in the mid  anterior/anteroseptal wall with associated wall motion abnormality  consistent with prior infarct. There is a low probability of ischemia.     2. Calculated ejection fraction 50% mild hypokinesis of the mid  anterior wall.     Cardiac Imaging:  No results found for this or any previous visit from the past 1095 days.      Past Medical History:  She has a past medical history of Abnormal levels of other serum enzymes, Acute kidney failure (CMS-HCC), Anemia, CKD (chronic kidney disease), COPD (chronic obstructive pulmonary disease) (Multi), Coronary artery disease, Disease of blood and blood-forming organs, unspecified, HLD (hyperlipidemia), Hypertension, MDS (myelodysplastic syndrome) (Multi), Personal history of other diseases of the musculoskeletal system and connective tissue, Personal history of other specified conditions, Personal history of other specified conditions, and Type 2 diabetes mellitus (Multi).    She has no past medical history of Adverse effect of anesthesia, Arthritis, Asthma ([facility]-HCC), Awareness under anesthesia, CHF (congestive heart failure) (Multi), Delayed emergence from general anesthesia, Disease of thyroid gland, Hard to intubate, History of transfusion, Malignant hyperthermia, PONV (postoperative nausea and vomiting), Pseudocholinesterase deficiency, Spinal headache, or Stroke (Multi).    Past Surgical History:  She has a past surgical history that includes Other surgical history (12/13/2019); Other surgical history (12/13/2019); Other surgical history (Bilateral, 12/13/2019); Other surgical history (Left,  12/13/2019); Other surgical history (12/13/2019); Other surgical history (12/13/2019); Other surgical history (Right, 12/13/2019); Other surgical history (04/16/2021); MR angio head wo IV contrast (11/20/2020); MR angio neck wo IV contrast (11/20/2020); Breast biopsy (Left); Hysterectomy; Breast lumpectomy; Appendectomy; Colonoscopy; Oophorectomy; and Joint replacement.      Social History:  She reports that she has never smoked. She has never used smokeless tobacco. She reports that she does not currently use alcohol. She reports that she does not use drugs.    Family History:  No family history on file.     Allergies:  Codeine, Hydrocodone, Hydrocodone-acetaminophen, Meperidine (pf), Tramadol, Piperacillin-tazobactam, Adhesive tape-silicones, and Meperidine    Inpatient Medications:  Scheduled medications   Medication Dose Route Frequency    allopurinol  100 mg oral Daily    amLODIPine  5 mg oral Daily    aspirin  81 mg oral Daily    cholecalciferol  2,000 Units oral Daily    ciprofloxacin  400 mg intravenous q24h    cyanocobalamin  1,000 mcg oral Daily    heparin (porcine)  5,000 Units subcutaneous q12h    insulin lispro  0-5 Units subcutaneous TID with meals    metoprolol tartrate  25 mg oral BID    metroNIDAZOLE  500 mg intravenous q8h    neomycin-bacitracnZn-polymyxnB   Topical Daily    pravastatin  40 mg oral Nightly    pregabalin  75 mg oral BID    sevelamer carbonate  800 mg oral TID with meals     PRN medications   Medication    acetaminophen    dextrose    dextrose    glucagon    glucagon    HYDROmorphone    ipratropium-albuteroL    melatonin    ondansetron    Or    ondansetron    polyethylene glycol     Continuous Medications   Medication Dose Last Rate     Outpatient Medications:  Current Outpatient Medications   Medication Instructions    allopurinol (ZYLOPRIM) 100 mg, oral, Daily    amLODIPine (Norvasc) 5 mg tablet 1 tablet, oral, Daily    amoxicillin-pot clavulanate (Augmentin) 875-125 mg tablet 1  tablet, oral, 2 times daily    cholecalciferol (Vitamin D-3) 50 MCG (2000 UT) tablet 1 tablet, oral, Daily    cyanocobalamin (Vitamin B-12) 1,000 mcg tablet 1 tablet, oral, Daily    metoprolol tartrate (LOPRESSOR) 25 mg, oral, 2 times daily    pioglitazone (ACTOS) 15 mg, oral, Daily    pravastatin (PRAVACHOL) 40 mg, oral, Nightly    pregabalin (Lyrica) 100 mg capsule TAKE ONE CAPSULE BY MOUTH TWICE DAILY @9AM & 5PM    sevelamer carbonate (RENVELA) 800 mg, oral, 3 times daily with meals, Swallow tablet whole; do not crush, break, or chew.    SITagliptin phosphate (JANUVIA) 25 mg, oral, Daily       Physical Exam:  General: Alert and Oriented, No distress, cooperative  Head: Normocephalic without obvious abnormality, atraumatic  Eyes: Conjunctiva/corneas clear, EOM's grossly intact  Neck: Supple, trachea midline, No thyroid enlargement/tenderness/nodules; No JVD  Lungs: Clear to auscultation bilaterally, no wheezes, rhonci, or rales. respirations unlabored  Chest Wall: No tenderness or deformity  Heart: Regular rhythm, normal S1/S2, no murmur  Abdomen: Soft, non-tender, Non-distended, bowel sounds active  Extremities: No edema, no cyanosis, no clubbing  Skin: Skin color, texture, turgor normal.  No rashes or lesions noted  Neurologic: Alert and oriented x 3, grossly moving all extremities, speech intact       Assessment/Plan   -Atrial fibrillation, paroxysmal   -Chronic diastolic heart failure  -Coronary disease, suspected  -Essential hypertension  -Hyperlipidemia  -MDS    Plan:  -Based on RCRI risk factor patient has 15.0 % 30-day risk of death, MI, or cardiac arrest for the planned procedure.  Patient has no ACS or acute heart failure.  Patient is not at a prohibitive risk for surgery.  Patient is moderate to high risk for perioperative events as described above.  Patient has presumed coronary artery disease and on statin therapy and aspirin therapy.  She is a poor candidate for stress testing or cardiac  catheterization given recurrent anemia requiring blood transfusion.  Her functional capacity has not excellent but she is able to perform ADLs without limitation.  She is likely able to do more than 4 METS.  She will not be able to tolerate any further antiplatelet therapy or anticoagulation.  She is not receiving anticoagulation for A-fib due to the similar reasons.  I discussed this with the family and the patient.  Patient is also not in favor of any cardiovascular testing currently.  Patient and family understand the risk.  Surgical team to further assess and determine the candidacy.      Given all these factors I would recommend patient to proceed with the surgery at the above risk without additional cardiac workup.    Continue other medical therapy as per primary team.  Cardiology will follow in the perioperative.  Please call with any questions.    I discussed my recommendation with the referring provider and the surgical team.        Peripheral IV 04/22/24 20 G Left Antecubital (Active)   Site Assessment Clean;Dry;Intact 04/26/24 0745   Dressing Status Clean;Dry 04/26/24 0745   Number of days: 4       Hemodialysis Cath Right Subclavian (Active)   Line Necessity Hemodialysis 04/26/24 0745   Site Assessment Clean;Intact 04/26/24 0745   Line Care Connections checked and tightened 04/26/24 0745   Cap Change Cap changed 04/26/24 0745   Dressing Type Antimicrobial patch 04/26/24 0745   Dressing Status Clean;Dry 04/26/24 0745   Dressing Intervention New dressing 04/26/24 0745   Dressing Change Due 05/01/24 04/26/24 0745   Number of days:        Code Status:  Full Code            Gabe Spear MD

## 2024-04-26 NOTE — ANESTHESIA PREPROCEDURE EVALUATION
Patient: Tana Hargrove    Procedure Information       Date/Time: 04/26/24 1300    Procedure: Laparoscopic cholecystectomy, possible open    Location: [facility] / [facility]    Surgeons: Ronald Grimes MD            Relevant Problems   Anesthesia (within normal limits)      Cardiac   (+) A-fib (Multi)   (+) Chest pain   (+) Coronary artery disease   (+) Essential (primary) hypertension   (+) Hyperlipidemia   (+) Hyperlipidemia, unspecified   (+) Hypertension, essential   (+) Paroxysmal atrial fibrillation (Multi)      Pulmonary   (+) Chronic obstructive pulmonary disease, unspecified (Multi)   (+) Pneumonia due to infectious organism      Neuro   (+) Anxiety   (+) Depression      GI   (+) GERD (gastroesophageal reflux disease)      /Renal   (+) Acute kidney injury (nontraumatic) (CMS-HCC)   (+) ESRD (end stage renal disease) on dialysis (Multi)      Liver   (+) Chronic cholecystitis   (+) Elevated LFTs      Endocrine   (+) Type 2 diabetes mellitus with hyperglycemia, without long-term current use of insulin (Multi)   (+) Type 2 diabetes mellitus without complications (Multi)      Hematology   (+) Anemia due to chronic kidney disease, on chronic dialysis (Multi)   (+) Anemia, unspecified   (+) Coagulation defect, unspecified (Multi)   (+) Macrocytic anemia   (+) Myelodysplastic syndrome (Multi)      Musculoskeletal   (+) Cervical spondylosis without myelopathy   (+) Degeneration of intervertebral disc of lumbar region   (+) Lumbar spondylosis   (+) Primary osteoarthritis   (+) Primary osteoarthritis of right knee   (+) Scoliosis of lumbar region due to degenerative disease of spine in adult   (+) Spinal stenosis of lumbar region      ID   (+) Pneumonia due to infectious organism       Clinical information reviewed:   Tobacco  Allergies  Meds   Med Hx  Surg Hx  OB Status  Fam Hx  Soc   Hx        NPO Detail:  NPO/Void Status  Carbohydrate Drink Given Prior to Surgery? : N  Date of Last Liquid:  04/25/24  Time of Last Liquid: 1700  Date of Last Solid: 04/24/24  Time of Last Solid: 1800  Last Intake Type: Clear fluids  Time of Last Void: 1210         Physical Exam    Airway  Mallampati: II  TM distance: >3 FB  Neck ROM: full     Cardiovascular - normal exam     Dental - normal exam     Pulmonary - normal exam     Abdominal - normal exam         Anesthesia Plan    History of general anesthesia?: yes  History of complications of general anesthesia?: no    ASA 3     general     The patient is not a current smoker.    intravenous induction   Postoperative administration of opioids is intended.  Anesthetic plan and risks discussed with patient.  Use of blood products discussed with patient who.    Plan discussed with CRNA.

## 2024-04-26 NOTE — PROGRESS NOTES
Tana Hargrove is a 79 y.o. female on day 4 of admission presenting with Fall, initial encounter.      Subjective   79F hx CAD, HTN, COPD, ESRD on HD via permacath, DM2, MDS, gout, neuropathy presents for fall     Patient thinks that she slipped on the floor this afternoon and fell backwards because of that but unsure. thinks knees may have given out as well. felt well after her hospitalization w/o abd pain, n/v, generalized or focal weakness, f/c. has been eating ok. denies dizziness or syncope/loss of consciousness. fell back and hit her buttocks and head. head bled for awhile, now self-resolved w/o sutures. no pain in buttocks/back. no other injuries. not able to get up on own until got help 4h later. no recent cp, sob, diarrhea, dysuria, ha, blurry vision, numbness. taking asa.     4/23: Patient was seen and examined.  She is awake and alert and interactive today.  Multiple changes of bandage dressing due to soaked with blood.  Being transfused with 1 unit of packed red blood cells and 1 unit coming up later today.  Will repeat H&H likely transfuse with 10 units.  Patient completed hemodialysis later today.  PT OT to evaluate for functional status.  Repeat H&H at 6 PM.  Repeat CBC and BMP.  4/24: Patient was seen and examined.  She had an episode of continued bleeding overnight and required more stable on the scalp in order to achieve hemostasis.  I will consult general/trauma surgery to evaluate scalp staples.  Trend CBC and transfuse with hemoglobin less than 7.  Seen by PT OT who recommend home with home health.  Potential discharge within the next 24 to 48 hours  4/25: Patient was seen and examined.  Developed right upper quadrant pain overnight after eating a hamburger for dinner.  Hemoglobin has remained stable at 7.6 this morning.  Seen by general surgery who has ordered a HIDA scan.  General surgery to consider cholecystectomy during this admission.  4/26: Patient was seen and examined.  HIDA scan  yesterday showed signs of acute cholecystitis.  Hemoglobin still borderline at 7.1 but holding.  Was seen by cardiology and I spoke with cardiology who placed her at moderate to high risk of surgery however no further testing will impact this risk.  Possible cholecystectomy later today.  Consider transfusion of 1 more unit of packed red blood cells intraoperatively and within the next 24 hours.  Repeat CBC and BMP in a.m.        Objective     Last Recorded Vitals  /59   Pulse 73   Temp 37.2 °C (99 °F)   Resp 12   Wt 68 kg (150 lb)   SpO2 96%   Intake/Output last 3 Shifts:    Intake/Output Summary (Last 24 hours) at 4/26/2024 1213  Last data filed at 4/26/2024 1200  Gross per 24 hour   Intake 860 ml   Output 1000 ml   Net -140 ml       Admission Weight  Weight: 68 kg (150 lb) (04/22/24 1443)    Daily Weight  04/22/24 : 68 kg (150 lb)    Image Results  NM hepatobiliary  Narrative: Interpreted By:  Leah Wiley and Maltbie Grace   STUDY:  NM HEPATOBILIARY;  4/25/2024 12:15 pm      INDICATION:  Signs/Symptoms:Right upper quadrant pain.      COMPARISON:  None.      ACCESSION NUMBER(S):  [id number]      ORDERING CLINICIAN:  SANDY GOLDEN      TECHNIQUE:  DIVISION OF NUCLEAR MEDICINE  HEP[facility]ILIARY SCAN [facility])      The patient received an intravenous dose of  5.0 mCi of Tc-99m  mebrofenin (Choletec).  Sequential images of the upper abdomen were  then acquired over the next 120 minutes.      FINDINGS:  There is prompt accumulation of activity within the liver and normal  subsequent excretion via the biliary ductal system into the small  bowel. Nonvisualization of gallbladder by 2 hours after radiotracer  injection suggestive of cystic duct obstruction..      Impression: 1. Nonvisualization of the gallbladder by 2 hours after radiotracer  injection, compatible with cystic duct obstruction and acute  cholecystitis.      Findings were communicated with Dr. Adina Kumari by Dr. Day Wheeler via epic secure  chat at 12:27 pm on 4/25/2024 with  verification.      I personally reviewed the images/study and I agree with the findings  as stated by Nuclear Medicine fellow Day Wheeler MD. This study  was interpreted at [facility],  [place], [place].      MACRO:  Critical Finding:  See findings. Notification was initiated on  4/25/2024 at 12:27 pm by  Day Wheeler.  (**-YCF-**) Instructions:      Signed by: Leah Wiley 4/25/2024 1:13 PM  Dictation workstation:   [id number]      Physical Exam  General: NAD, well-appearing, cooperative. no jaundice, pallor, rash, bruises  HEENT: NC/AT, MMM, no oral lesions or pharyngeal erythema. PERRL. no scleral icterus or conjunctival injection. neck soft w/o masses or LAD. superficial scalp lac well-approximated. no active bleed  CV: RRR, no murmurs, rubs, or gallops. No JVD.  Chest: breathing unlabored. CTAB w/ adequate air-entry. permacath c/d/i  Abd: non-distended. BS+. soft, non-tender. no masses or organomegaly.  Extr: wwp, 2+ and symmetric peripheral pulses. no LE edema.  Neuro: AAOx3. CNII-XII intact. no focal findings.       Relevant Results               Assessment/Plan                  Active Problems:    Thickening of wall of gallbladder    Chronic cholecystitis    79F hx CAD, HTN, COPD, ESRD on HD via permacath, DM2, MDS, gout, neuropathy presents for fall     #ground level fall, mechanical:  #L parietal scalp laceration/small hematoma s/p staples x2:  slipped on floor. no pre/syncope or new focal deficits. no other injuries. ct head w/o ich. no active scalp bleeding. on asa, no anticoagulants  - ekg, ck, ct head/cspine wnl; f/u cxr, pelvic xr  -Continue pt eval; fall precautions; wound care; needs staples removed in 7d  -General surgery on board     #ESRD on HD via permacath:   missed hd today 4/22, no urgent needs  - consult renal; sevelamer, vid d     # History of acute cholecystitis  -For planned cholecystectomy today.  -Cardiac clearance  puts her at moderate to high risk for surgery but not prohibitive.  - continued augmentin to finish 10d  -HIDA scan reviewed  - General surgery on board      #CAD: stable; mtp, statin, asa; cardiac clearance completed  #HTN: stable; amlodipine, mtp  #COPD: stable; albuterol prn  #DM2: bg wnl. iss; hold outpt meds  #MDS: cell counts stable; outpt heme f/u  #gout: home allopurinol  #neuropathy: home lyrica     #FEN/GI: renal/diabetic  #PPx: heparin sq  #Lines/Tubes/Drains: piv, permacath  #Analgesia/Sedation: tylenol  #Code: full  #Dispo: pending clinical stability  #Contact: tremaine daniel [phone]          Spent 35 minutes in the follow-up management of this patient today.    Naga Carr MD

## 2024-04-26 NOTE — NURSING NOTE
Report from Sending RN:    Report From: Elva  Recent Surgery of Procedure: No  Baseline Level of Consciousness (LOC): Alert and oriented X's3  Oxygen Use: No  Type: room air  Diabetic: No  Last BP Med Given Day of Dialysis: see EMAR  Last Pain Med Given: see EMAR  Lab Tests to be Obtained with Dialysis: No  Blood Transfusion to be Given During Dialysis: No  Available IV Access: Yes  Medications to be Administered During Dialysis: No  Continuous IV Infusion Running: No  Restraints on Currently or in the Last 24 Hours: No  Hand-Off Communication: Patient due for cholecystectomy this afternoon  Dialysis Catheter Dressing: Dry clean and intact  Last Dressing Change: 4/25/2024

## 2024-04-27 ENCOUNTER — APPOINTMENT (OUTPATIENT)
Dept: CARDIOLOGY | Facility: HOSPITAL | Age: 79
DRG: 981 | End: 2024-04-27
Payer: MEDICARE

## 2024-04-27 LAB
ALBUMIN SERPL BCP-MCNC: 3.2 G/DL (ref 3.4–5)
ALP SERPL-CCNC: 85 U/L (ref 33–136)
ALT SERPL W P-5'-P-CCNC: 14 U/L (ref 7–45)
ANION GAP SERPL CALC-SCNC: 10 MMOL/L (ref 10–20)
APTT PPP: 28 SECONDS (ref 27–38)
AST SERPL W P-5'-P-CCNC: 16 U/L (ref 9–39)
BASO STIPL BLD QL SMEAR: PRESENT
BASOPHILS # BLD AUTO: 0.02 X10*3/UL (ref 0–0.1)
BASOPHILS NFR BLD AUTO: 0.4 %
BILIRUB DIRECT SERPL-MCNC: 0.1 MG/DL (ref 0–0.3)
BILIRUB SERPL-MCNC: 0.5 MG/DL (ref 0–1.2)
BUN SERPL-MCNC: 16 MG/DL (ref 6–23)
CALCIUM SERPL-MCNC: 8.3 MG/DL (ref 8.6–10.3)
CHLORIDE SERPL-SCNC: 102 MMOL/L (ref 98–107)
CO2 SERPL-SCNC: 27 MMOL/L (ref 21–32)
CREAT SERPL-MCNC: 1.97 MG/DL (ref 0.5–1.05)
DACRYOCYTES BLD QL SMEAR: NORMAL
EGFRCR SERPLBLD CKD-EPI 2021: 25 ML/MIN/1.73M*2
EOSINOPHIL # BLD AUTO: 0 X10*3/UL (ref 0–0.4)
EOSINOPHIL NFR BLD AUTO: 0 %
ERYTHROCYTE [DISTWIDTH] IN BLOOD BY AUTOMATED COUNT: 23.5 % (ref 11.5–14.5)
GLUCOSE BLD MANUAL STRIP-MCNC: 101 MG/DL (ref 74–99)
GLUCOSE BLD MANUAL STRIP-MCNC: 110 MG/DL (ref 74–99)
GLUCOSE BLD MANUAL STRIP-MCNC: 119 MG/DL (ref 74–99)
GLUCOSE BLD MANUAL STRIP-MCNC: 148 MG/DL (ref 74–99)
GLUCOSE SERPL-MCNC: 146 MG/DL (ref 74–99)
HCT VFR BLD AUTO: 20.6 % (ref 36–46)
HGB BLD-MCNC: 6.4 G/DL (ref 12–16)
HYPOCHROMIA BLD QL SMEAR: NORMAL
IMM GRANULOCYTES # BLD AUTO: 0.02 X10*3/UL (ref 0–0.5)
IMM GRANULOCYTES NFR BLD AUTO: 0.4 % (ref 0–0.9)
INR PPP: 1.1 (ref 0.9–1.1)
LYMPHOCYTES # BLD AUTO: 0.74 X10*3/UL (ref 0.8–3)
LYMPHOCYTES NFR BLD AUTO: 14.3 %
MAGNESIUM SERPL-MCNC: 1.72 MG/DL (ref 1.6–2.4)
MCH RBC QN AUTO: 29.6 PG (ref 26–34)
MCHC RBC AUTO-ENTMCNC: 31.1 G/DL (ref 32–36)
MCV RBC AUTO: 95 FL (ref 80–100)
MONOCYTES # BLD AUTO: 0.38 X10*3/UL (ref 0.05–0.8)
MONOCYTES NFR BLD AUTO: 7.3 %
NEUTROPHILS # BLD AUTO: 4.03 X10*3/UL (ref 1.6–5.5)
NEUTROPHILS NFR BLD AUTO: 77.6 %
NRBC BLD-RTO: 0 /100 WBCS (ref 0–0)
PHOSPHATE SERPL-MCNC: 4.3 MG/DL (ref 2.5–4.9)
PLATELET # BLD AUTO: 203 X10*3/UL (ref 150–450)
POLYCHROMASIA BLD QL SMEAR: NORMAL
POTASSIUM SERPL-SCNC: 4.4 MMOL/L (ref 3.5–5.3)
PROT SERPL-MCNC: 5.3 G/DL (ref 6.4–8.2)
PROTHROMBIN TIME: 12.9 SECONDS (ref 9.8–12.8)
RBC # BLD AUTO: 2.16 X10*6/UL (ref 4–5.2)
RBC MORPH BLD: NORMAL
SODIUM SERPL-SCNC: 135 MMOL/L (ref 136–145)
TARGETS BLD QL SMEAR: NORMAL
WBC # BLD AUTO: 5.2 X10*3/UL (ref 4.4–11.3)

## 2024-04-27 PROCEDURE — 99152 MOD SED SAME PHYS/QHP 5/>YRS: CPT | Performed by: RADIOLOGY

## 2024-04-27 PROCEDURE — C1729 CATH, DRAINAGE: HCPCS | Performed by: RADIOLOGY

## 2024-04-27 PROCEDURE — 2500000001 HC RX 250 WO HCPCS SELF ADMINISTERED DRUGS (ALT 637 FOR MEDICARE OP): Performed by: SURGERY

## 2024-04-27 PROCEDURE — 99024 POSTOP FOLLOW-UP VISIT: CPT | Performed by: SURGERY

## 2024-04-27 PROCEDURE — 85610 PROTHROMBIN TIME: CPT | Performed by: SURGERY

## 2024-04-27 PROCEDURE — 2500000005 HC RX 250 GENERAL PHARMACY W/O HCPCS: Performed by: RADIOLOGY

## 2024-04-27 PROCEDURE — P9040 RBC LEUKOREDUCED IRRADIATED: HCPCS

## 2024-04-27 PROCEDURE — 83735 ASSAY OF MAGNESIUM: CPT | Performed by: SURGERY

## 2024-04-27 PROCEDURE — 2500000004 HC RX 250 GENERAL PHARMACY W/ HCPCS (ALT 636 FOR OP/ED): Performed by: SURGERY

## 2024-04-27 PROCEDURE — 36415 COLL VENOUS BLD VENIPUNCTURE: CPT | Performed by: SURGERY

## 2024-04-27 PROCEDURE — 82947 ASSAY GLUCOSE BLOOD QUANT: CPT

## 2024-04-27 PROCEDURE — 2500000006 HC RX 250 W HCPCS SELF ADMINISTERED DRUGS (ALT 637 FOR ALL PAYERS): Performed by: SURGERY

## 2024-04-27 PROCEDURE — 2720000007 HC OR 272 NO HCPCS: Performed by: RADIOLOGY

## 2024-04-27 PROCEDURE — 82248 BILIRUBIN DIRECT: CPT | Performed by: SURGERY

## 2024-04-27 PROCEDURE — 2550000001 HC RX 255 CONTRASTS: Performed by: INTERNAL MEDICINE

## 2024-04-27 PROCEDURE — 85025 COMPLETE CBC W/AUTO DIFF WBC: CPT | Performed by: SURGERY

## 2024-04-27 PROCEDURE — 77001 FLUOROGUIDE FOR VEIN DEVICE: CPT | Performed by: RADIOLOGY

## 2024-04-27 PROCEDURE — 85730 THROMBOPLASTIN TIME PARTIAL: CPT | Performed by: SURGERY

## 2024-04-27 PROCEDURE — C1894 INTRO/SHEATH, NON-LASER: HCPCS | Performed by: RADIOLOGY

## 2024-04-27 PROCEDURE — 36430 TRANSFUSION BLD/BLD COMPNT: CPT

## 2024-04-27 PROCEDURE — 99233 SBSQ HOSP IP/OBS HIGH 50: CPT | Performed by: INTERNAL MEDICINE

## 2024-04-27 PROCEDURE — 1200000002 HC GENERAL ROOM WITH TELEMETRY DAILY

## 2024-04-27 PROCEDURE — 2500000004 HC RX 250 GENERAL PHARMACY W/ HCPCS (ALT 636 FOR OP/ED): Performed by: RADIOLOGY

## 2024-04-27 PROCEDURE — 2500000005 HC RX 250 GENERAL PHARMACY W/O HCPCS: Performed by: SURGERY

## 2024-04-27 PROCEDURE — 47490 INCISION OF GALLBLADDER: CPT | Performed by: RADIOLOGY

## 2024-04-27 PROCEDURE — 87186 SC STD MICRODIL/AGAR DIL: CPT | Mod: PORLAB | Performed by: SURGERY

## 2024-04-27 PROCEDURE — 80048 BASIC METABOLIC PNL TOTAL CA: CPT | Performed by: SURGERY

## 2024-04-27 PROCEDURE — C1769 GUIDE WIRE: HCPCS | Performed by: RADIOLOGY

## 2024-04-27 PROCEDURE — 84100 ASSAY OF PHOSPHORUS: CPT | Performed by: SURGERY

## 2024-04-27 RX ORDER — FENTANYL CITRATE 50 UG/ML
INJECTION, SOLUTION INTRAMUSCULAR; INTRAVENOUS
Status: COMPLETED | OUTPATIENT
Start: 2024-04-27 | End: 2024-04-27

## 2024-04-27 RX ORDER — MIDAZOLAM HYDROCHLORIDE 1 MG/ML
INJECTION, SOLUTION INTRAMUSCULAR; INTRAVENOUS
Status: COMPLETED | OUTPATIENT
Start: 2024-04-27 | End: 2024-04-27

## 2024-04-27 RX ORDER — LIDOCAINE HYDROCHLORIDE 20 MG/ML
INJECTION, SOLUTION EPIDURAL; INFILTRATION; INTRACAUDAL; PERINEURAL
Status: COMPLETED | OUTPATIENT
Start: 2024-04-27 | End: 2024-04-27

## 2024-04-27 RX ADMIN — HEPARIN SODIUM 5000 UNITS: 5000 INJECTION INTRAVENOUS; SUBCUTANEOUS at 08:40

## 2024-04-27 RX ADMIN — METOPROLOL TARTRATE 25 MG: 25 TABLET, FILM COATED ORAL at 20:51

## 2024-04-27 RX ADMIN — HEPARIN SODIUM 5000 UNITS: 5000 INJECTION INTRAVENOUS; SUBCUTANEOUS at 20:51

## 2024-04-27 RX ADMIN — PRAVASTATIN SODIUM 40 MG: 40 TABLET ORAL at 20:51

## 2024-04-27 RX ADMIN — METOPROLOL TARTRATE 25 MG: 25 TABLET, FILM COATED ORAL at 08:42

## 2024-04-27 RX ADMIN — MIDAZOLAM 0.5 MG: 1 INJECTION INTRAMUSCULAR; INTRAVENOUS at 14:20

## 2024-04-27 RX ADMIN — CYANOCOBALAMIN TAB 1000 MCG 1000 MCG: 1000 TAB at 08:42

## 2024-04-27 RX ADMIN — FENTANYL CITRATE 25 MCG: 0.05 INJECTION, SOLUTION INTRAMUSCULAR; INTRAVENOUS at 14:19

## 2024-04-27 RX ADMIN — AMLODIPINE BESYLATE 5 MG: 5 TABLET ORAL at 08:41

## 2024-04-27 RX ADMIN — BACITRACIN ZINC, NEOMYCIN, POLYMYXIN B 10 APPLICATION: 400; 3.5; 5 OINTMENT TOPICAL at 08:43

## 2024-04-27 RX ADMIN — HYDROMORPHONE HYDROCHLORIDE 2 MG: 2 TABLET ORAL at 00:28

## 2024-04-27 RX ADMIN — SEVELAMER CARBONATE 800 MG: 800 TABLET, FILM COATED ORAL at 16:01

## 2024-04-27 RX ADMIN — Medication 2 L/MIN: at 14:17

## 2024-04-27 RX ADMIN — Medication 2000 UNITS: at 08:42

## 2024-04-27 RX ADMIN — PREGABALIN 75 MG: 75 CAPSULE ORAL at 20:51

## 2024-04-27 RX ADMIN — PREGABALIN 75 MG: 75 CAPSULE ORAL at 08:41

## 2024-04-27 RX ADMIN — CIPROFLOXACIN 400 MG: 400 INJECTION, SOLUTION INTRAVENOUS at 17:11

## 2024-04-27 RX ADMIN — ASPIRIN 81 MG CHEWABLE TABLET 81 MG: 81 TABLET CHEWABLE at 08:41

## 2024-04-27 RX ADMIN — METRONIDAZOLE 500 MG: 500 INJECTION, SOLUTION INTRAVENOUS at 09:09

## 2024-04-27 RX ADMIN — METRONIDAZOLE 500 MG: 500 INJECTION, SOLUTION INTRAVENOUS at 15:54

## 2024-04-27 RX ADMIN — LIDOCAINE HYDROCHLORIDE 10 ML: 20 INJECTION, SOLUTION EPIDURAL; INFILTRATION; INTRACAUDAL; PERINEURAL at 14:19

## 2024-04-27 RX ADMIN — IOHEXOL 10 ML: 300 INJECTION, SOLUTION INTRAVENOUS at 14:38

## 2024-04-27 RX ADMIN — HYDROMORPHONE HYDROCHLORIDE 2 MG: 2 TABLET ORAL at 19:49

## 2024-04-27 RX ADMIN — ALLOPURINOL 100 MG: 100 TABLET ORAL at 08:41

## 2024-04-27 ASSESSMENT — PAIN - FUNCTIONAL ASSESSMENT
PAIN_FUNCTIONAL_ASSESSMENT: 0-10

## 2024-04-27 ASSESSMENT — PAIN SCALES - GENERAL
PAINLEVEL_OUTOF10: 4
PAINLEVEL_OUTOF10: 7
PAINLEVEL_OUTOF10: 4
PAINLEVEL_OUTOF10: 3
PAINLEVEL_OUTOF10: 4
PAINLEVEL_OUTOF10: 3
PAINLEVEL_OUTOF10: 4
PAINLEVEL_OUTOF10: 4
PAINLEVEL_OUTOF10: 0 - NO PAIN
PAINLEVEL_OUTOF10: 3
PAINLEVEL_OUTOF10: 3
PAINLEVEL_OUTOF10: 7

## 2024-04-27 ASSESSMENT — COGNITIVE AND FUNCTIONAL STATUS - GENERAL
MOVING FROM LYING ON BACK TO SITTING ON SIDE OF FLAT BED WITH BEDRAILS: A LITTLE
STANDING UP FROM CHAIR USING ARMS: A LITTLE
DRESSING REGULAR UPPER BODY CLOTHING: A LITTLE
CLIMB 3 TO 5 STEPS WITH RAILING: A LOT
PERSONAL GROOMING: A LITTLE
TOILETING: A LITTLE
PERSONAL GROOMING: A LITTLE
DAILY ACTIVITIY SCORE: 19
DRESSING REGULAR UPPER BODY CLOTHING: A LITTLE
WALKING IN HOSPITAL ROOM: A LITTLE
MOVING TO AND FROM BED TO CHAIR: A LITTLE
DAILY ACTIVITIY SCORE: 19
HELP NEEDED FOR BATHING: A LITTLE
MOVING FROM LYING ON BACK TO SITTING ON SIDE OF FLAT BED WITH BEDRAILS: A LITTLE
MOVING TO AND FROM BED TO CHAIR: A LITTLE
EATING MEALS: A LITTLE
MOBILITY SCORE: 17
TOILETING: A LITTLE
WALKING IN HOSPITAL ROOM: A LITTLE
TURNING FROM BACK TO SIDE WHILE IN FLAT BAD: A LITTLE
STANDING UP FROM CHAIR USING ARMS: A LITTLE
MOBILITY SCORE: 17
CLIMB 3 TO 5 STEPS WITH RAILING: A LOT
TURNING FROM BACK TO SIDE WHILE IN FLAT BAD: A LITTLE
HELP NEEDED FOR BATHING: A LITTLE
EATING MEALS: A LITTLE

## 2024-04-27 ASSESSMENT — PAIN DESCRIPTION - LOCATION: LOCATION: ABDOMEN

## 2024-04-27 ASSESSMENT — ENCOUNTER SYMPTOMS
CHILLS: 0
NAUSEA: 0
SHORTNESS OF BREATH: 0
FEVER: 0
JOINT SWELLING: 0
CHEST TIGHTNESS: 0
ABDOMINAL PAIN: 1
LIGHT-HEADEDNESS: 0

## 2024-04-27 ASSESSMENT — PAIN DESCRIPTION - ORIENTATION: ORIENTATION: RIGHT

## 2024-04-27 ASSESSMENT — PAIN DESCRIPTION - DESCRIPTORS: DESCRIPTORS: ACHING

## 2024-04-27 NOTE — PROGRESS NOTES
Subjective Data:  Today, patient is resting in chair at side of bed. Denies chest pain or pressure, no palpitations, no shortness of breath. Not on telemetry    Overnight Events:    None     Objective Data:  Last Recorded Vitals:  Vitals:    04/27/24 0607 04/27/24 0622 04/27/24 0707 04/27/24 0957   BP: 136/64 165/72 149/68 146/73   BP Location:    Left arm   Patient Position:    Lying   Pulse: 66 66 67 82   Resp: 18 18 18 18   Temp: 36.6 °C (97.8 °F) 36.6 °C (97.9 °F) 36.8 °C (98.2 °F) 36.2 °C (97.2 °F)   TempSrc: Temporal Temporal  Temporal   SpO2: 92% 94% 93% 96%   Weight:       Height:           Last Labs:  CBC - 4/27/2024:  4:06 AM  5.2 6.4 203    20.6      CMP - 4/27/2024:  4:06 AM  8.3 5.3 16 --- 0.5   4.3 3.2 14 85      PTT - 4/27/2024:  4:06 AM  1.1   12.9 28     TROPHS   Date/Time Value Ref Range Status   04/22/2024 04:01 PM 8 0 - 13 ng/L Final   04/13/2024 02:29 AM 16 0 - 13 ng/L Final   04/13/2024 01:33 AM 14 0 - 13 ng/L Final     BNP   Date/Time Value Ref Range Status   04/22/2024 04:01  0 - 99 pg/mL Final   04/13/2024 01:33  0 - 99 pg/mL Final     HGBA1C   Date/Time Value Ref Range Status   03/14/2024 06:28 AM 6.3 4.3 - 5.6 % Final     Comment:     American Diabetes Association guidelines indicate that patients with HgbA1c in the range 5.7-6.4% are at increased risk for development of diabetes, and intervention by lifestyle modification may be beneficial. HgbA1c greater or equal to 6.5% is considered diagnostic of diabetes.   02/26/2024 09:46 AM 6.6 see below % Final   09/26/2022 10:24 AM 5.9 % Final     Comment:          Diagnosis of Diabetes-Adults   Non-Diabetic: < or = 5.6%   Increased risk for developing diabetes: 5.7-6.4%   Diagnostic of diabetes: > or = 6.5%  .       Monitoring of Diabetes                Age (y)     Therapeutic Goal (%)   Adults:          >18           <7.0   Pediatrics:    13-18           <7.5                   7-12           <8.0                   0- 6             7.5-8.5   American Diabetes Association. Diabetes Care 33(S1), Jan 2010.     04/14/2022 02:10 PM 7.3 % Final     Comment:          Diagnosis of Diabetes-Adults   Non-Diabetic: < or = 5.6%   Increased risk for developing diabetes: 5.7-6.4%   Diagnostic of diabetes: > or = 6.5%  .       Monitoring of Diabetes                Age (y)     Therapeutic Goal (%)   Adults:          >18           <7.0   Pediatrics:    13-18           <7.5                   7-12           <8.0                   0- 6            7.5-8.5   American Diabetes Association. Diabetes Care 33(S1), Jan 2010.     03/06/2021 01:52 AM 5.8 % Final     Comment:     Normal less than 5.7%  Prediabetes 5.7% to 6.4%  Diabetes 6.5% or higher      --HgbA1C levels may not be accurate in patients who have  renal disease, received recent blood transfusions, are anemic,  or who have dyshemoglobinemia.     VLDL   Date/Time Value Ref Range Status   05/19/2023 11:17 AM 15 0 - 40 mg/dL Final   09/26/2022 10:24 AM 22 0 - 40 mg/dL Final   04/15/2022 10:27 AM 9 0 - 40 mg/dL Final      Last I/O:  I/O last 3 completed shifts:  In: 1876.7 (27.6 mL/kg) [P.O.:480; I.V.:1096.7 (16.1 mL/kg); IV Piggyback:300]  Out: 1050 (15.4 mL/kg) [Other:1000; Blood:50]  Weight: 68 kg     Past Cardiology Tests (Last 3 Years):  EKG:  ECG 12 lead 04/22/2024      ECG 12 lead 04/18/2024      Electrocardiogram, 12-lead PRN ACS symptoms 04/14/2024      ECG 12 lead 04/13/2024      ECG 12 lead 02/25/2024      ECG 12 lead 02/14/2024      ECG 12 lead 02/07/2024    Echo:  Transthoracic Echo (TTE) Complete 02/15/2024    Ejection Fractions:  EF   Date/Time Value Ref Range Status   02/15/2024 02:13 PM 67 %      Cath:  No results found for this or any previous visit from the past 1095 days.    Stress Test:  Nuclear Stress Test 04/08/2022    Cardiac Imaging:  No results found for this or any previous visit from the past 1095 days.      Inpatient Medications:  Scheduled medications   Medication Dose Route  Frequency    acetaminophen  650 mg oral q8h    allopurinol  100 mg oral Daily    amLODIPine  5 mg oral Daily    aspirin  81 mg oral Daily    cholecalciferol  2,000 Units oral Daily    ciprofloxacin  400 mg intravenous q24h    cyanocobalamin  1,000 mcg oral Daily    heparin (porcine)  5,000 Units subcutaneous q12h    insulin lispro  0-5 Units subcutaneous TID with meals    lidocaine  1 patch transdermal Daily    metoprolol tartrate  25 mg oral BID    metroNIDAZOLE  500 mg intravenous q8h    neomycin-bacitracnZn-polymyxnB   Topical Daily    pravastatin  40 mg oral Nightly    pregabalin  75 mg oral BID    sevelamer carbonate  800 mg oral TID with meals     PRN medications   Medication    dextrose    dextrose    diphenhydrAMINE    glucagon    glucagon    HYDROmorphone    HYDROmorphone    HYDROmorphone    ipratropium-albuteroL    melatonin    ondansetron    Or    ondansetron    polyethylene glycol     Continuous Medications   Medication Dose Last Rate       Physical Exam:  Physical Exam  Vitals reviewed.   Constitutional:       Appearance: Normal appearance.   HENT:      Head: Normocephalic.      Mouth/Throat:      Mouth: Mucous membranes are moist.   Cardiovascular:      Rate and Rhythm: Normal rate and regular rhythm.      Pulses: Normal pulses.      Heart sounds: Normal heart sounds. No murmur heard.     No friction rub. No gallop.   Pulmonary:      Effort: Pulmonary effort is normal.      Breath sounds: Normal breath sounds. No wheezing, rhonchi or rales.   Abdominal:      General: Bowel sounds are normal.      Palpations: Abdomen is soft.   Musculoskeletal:         General: Normal range of motion.      Cervical back: Normal range of motion.      Right lower leg: No edema.      Left lower leg: No edema.   Skin:     General: Skin is warm and dry.   Neurological:      General: No focal deficit present.      Mental Status: She is alert and oriented to person, place, and time.   Psychiatric:         Mood and Affect: Mood  normal.         Behavior: Behavior normal.         Thought Content: Thought content normal.         Judgment: Judgment normal.           Assessment/Plan   Atrial fibrillation, paroxysmal   -Chronic diastolic heart failure  -Coronary disease, suspected  -Essential hypertension  -Hyperlipidemia  -MDS    4/27/24: Patient seen after procedure, no cardiac concerns, denies chest pain , palpitations. She appears euvolemic on exam. She has chronic anemia and received transfusion this morning.     Cardiology will sign off, call with any new cardiac concerns. Patient has scheduled cardiology follow up next week and she would like to keep appt.     Code Status:  Full Code    ]      Concepcion Enciso, GAB-CNP

## 2024-04-27 NOTE — POST-PROCEDURE NOTE
Interventional Radiology Brief Postprocedure Note    Attending: Jin House MD    Assistant:   Staff Role   No Staff Documented       Diagnosis:   1. Fall, initial encounter  Referral to [facility]    Referral to [facility]      2. Closed head injury, initial encounter  Referral to [facility]      3. ESTEE (acute kidney injury) (CMS-HCC)        4. Fracture of bone of hip (Multi)  Referral to [facility]      5. Articular cartilage disorder of hip, unspecified laterality  Referral to [facility]      6. ESRD (end stage renal disease) on dialysis (Multi)  Referral to [facility]      7. Repeated falls  Referral to [facility]      8. Primary osteoarthritis of right knee  Referral to [facility]      9. Frequent falls  Referral to [facility]      10. Chronic cholecystitis  Case Request Operating Room: Laparoscopic cholecystectomy, possible open    Case Request Operating Room: Laparoscopic cholecystectomy, possible open          Description of procedure: Image guided placement of an 8F cholecystostomy tube. Pus was aspirated and a 10cc sampled was sent off for cx.    Timeout:  Yes    Procedure Area: Procedure Area     Anesthesia:   Conscious Sedation    Complications: None    Estimated Blood Loss: minimal    Medications  As of 04/27/24 1434      ondansetron (Zofran) tablet 4 mg (mg) Total dose:  Cannot be calculated* Dosing weight:  68   *Administration dose not documented     Date/Time Rate/Dose/Volume Action       04/26/24  1205 *Not included in total MAR Hold      1601 *Not included in total MAR Unhold               ondansetron (Zofran) injection 4 mg (mg) Total dose:  Cannot be calculated* Dosing weight:  68   *Administration dose not documented     Date/Time Rate/Dose/Volume Action       04/26/24  1205 *Not included in total MAR Hold      1601 *Not included in total MAR Unhold               melatonin tablet 3 mg (mg) Total dose:  Cannot be calculated* Dosing  weight:  68   *Administration dose not documented     Date/Time Rate/Dose/Volume Action       04/26/24  1205 *Not included in total MAR Hold      1601 *Not included in total MAR Unhold               polyethylene glycol (Glycolax, Miralax) packet 17 g (g) Total dose:  Cannot be calculated* Dosing weight:  68   *Administration dose not documented     Date/Time Rate/Dose/Volume Action       04/26/24  1205 *Not included in total MAR Hold      1601 *Not included in total MAR Unhold               heparin (porcine) injection 5,000 Units (Units) Total dose:  25,000 Units* Dosing weight:  68   *Administration not included in total     Date/Time Rate/Dose/Volume Action       04/23/24  0023 5,000 Units Given      0905 *5,000 Units Missed      2105 *5,000 Units Missed     04/24/24  0905 *5,000 Units Missed      2206 5,000 Units Given     04/25/24  0905 *5,000 Units Missed      2104 5,000 Units Given     04/26/24  0905 *5,000 Units Missed      1205 *Not included in total MAR Hold      1601 *Not included in total MAR Unhold      2027 5,000 Units Given     04/27/24  0840 5,000 Units Given               allopurinol (Zyloprim) tablet 100 mg (mg) Total dose:  300 mg*   *Administration not included in total     Date/Time Rate/Dose/Volume Action       04/23/24  0913 100 mg Given     04/24/24  0818 100 mg Given     04/25/24  0900 *100 mg Missed     04/26/24  0900 *100 mg Missed      1205 *Not included in total MAR Hold      1601 *Not included in total MAR Unhold     04/27/24  0841 100 mg Given               amLODIPine (Norvasc) tablet 5 mg (mg) Total dose:  10 mg*   *Administration not included in total     Date/Time Rate/Dose/Volume Action       04/23/24  0900 *5 mg Missed     04/24/24  0818 5 mg Given     04/25/24  0900 *5 mg Missed     04/26/24  0900 *5 mg Missed      1205 *Not included in total MAR Hold      1601 *Not included in total MAR Unhold     04/27/24  0841 5 mg Given               amoxicillin-pot clavulanate (Augmentin)  875-125 mg per tablet 1 tablet (tablet) Total dose:  0 tablet* Dosing weight:  68   *Administration not included in total     Date/Time Rate/Dose/Volume Action       04/22/24  2130 *1 tablet Missed               cholecalciferol (Vitamin D-3) tablet 2,000 Units (Units) Total dose:  6,000 Units*   *Administration not included in total     Date/Time Rate/Dose/Volume Action       04/23/24  0914 2,000 Units Given     04/24/24  0818 2,000 Units Given     04/25/24  0900 *2,000 Units Missed     04/26/24  0900 *2,000 Units Missed      1205 *Not included in total MAR Hold      1601 *Not included in total MAR Unhold     04/27/24  0842 2,000 Units Given               cyanocobalamin (Vitamin B-12) tablet 1,000 mcg (mcg) Total dose:  3,000 mcg*   *Administration not included in total     Date/Time Rate/Dose/Volume Action       04/23/24  0913 1,000 mcg Given     04/24/24  0818 1,000 mcg Given     04/25/24  0900 *1,000 mcg Missed     04/26/24  0900 *1,000 mcg Missed      1205 *Not included in total MAR Hold      1601 *Not included in total MAR Unhold     04/27/24  0842 1,000 mcg Given               metoprolol tartrate (Lopressor) tablet 25 mg (mg) Total dose:  200 mg*   *Administration not included in total     Date/Time Rate/Dose/Volume Action       04/23/24  0024 25 mg Given      0914 25 mg Given      2147 25 mg Given     04/24/24  0818 25 mg Given      2206 25 mg Given     04/25/24  0900 *25 mg Missed      2104 25 mg Given     04/26/24  0900 *25 mg Missed      1205 *Not included in total MAR Hold      1601 *Not included in total MAR Unhold      2027 25 mg Given     04/27/24  0842 25 mg Given               pravastatin (Pravachol) tablet 40 mg (mg) Total dose:  200 mg      Date/Time Rate/Dose/Volume Action       04/23/24  0024 40 mg Given      2147 40 mg Given     04/24/24  2206 40 mg Given     04/25/24  2104 40 mg Given     04/26/24  1205 *Not included in total MAR Hold      1601 *Not included in total MAR Unhold      2027 40 mg  Given               pregabalin (Lyrica) capsule 100 mg (mg) Total volume:  Not documented*   *Total volume has not been documented. View each administration to see the amount administered.     Date/Time Rate/Dose/Volume Action       04/22/24  2130 *100 mg Missed               sevelamer carbonate (Renvela) tablet 800 mg (mg) Total dose:  4,800 mg* Dosing weight:  68   *Administration not included in total     Date/Time Rate/Dose/Volume Action       04/23/24  0917 800 mg Given      1200 *800 mg Missed      1657 800 mg Given     04/24/24  0818 800 mg Given      1235 800 mg Given      1732 800 mg Given     04/25/24  0800 *800 mg Missed      1200 *800 mg Missed      1635 800 mg Given     04/26/24  0800 *800 mg Missed      1200 *800 mg Missed      1205 *Not included in total MAR Hold      1601 *Not included in total MAR Unhold      1700 *800 mg Missed     04/27/24  0800 *800 mg Missed      1200 *800 mg Missed               amoxicillin-pot clavulanate (Augmentin) 500-125 mg per tablet 1 tablet (tablet) Total dose:  5 tablet* Dosing weight:  68   *Administration not included in total     Date/Time Rate/Dose/Volume Action       04/23/24  0024 1 tablet Given      0913 1 tablet Given      2147 1 tablet Given     04/24/24  0818 1 tablet Given      2205 1 tablet Given     04/25/24  0900 *1 tablet Missed               pregabalin (Lyrica) capsule 75 mg (mg) Total dose:  525 mg*   *Administration not included in total     Date/Time Rate/Dose/Volume Action       04/23/24  0024 75 mg Given      0900 *75 mg Missed      2147 75 mg Given     04/24/24  0818 75 mg Given      2206 75 mg Given     04/25/24  0900 *75 mg Missed      2104 75 mg Given     04/26/24  0900 *75 mg Missed      1205 *Not included in total MAR Hold      1601 *Not included in total MAR Unhold      2027 75 mg Given     04/27/24  0841 75 mg Given               glucagon (Glucagen) injection 1 mg (mg) Total dose:  Cannot be calculated* Dosing weight:  68   *Administration  dose not documented     Date/Time Rate/Dose/Volume Action       04/26/24  1205 *Not included in total MAR Hold      1601 *Not included in total MAR Unhold               glucagon (Glucagen) injection 1 mg (mg) Total dose:  Cannot be calculated* Dosing weight:  68   *Administration dose not documented     Date/Time Rate/Dose/Volume Action       04/26/24  1205 *Not included in total MAR Hold      1601 *Not included in total MAR Unhold               dextrose 50 % injection 25 g (g) Total dose:  Cannot be calculated* Dosing weight:  68   *Administration dose not documented     Date/Time Rate/Dose/Volume Action       04/26/24  1205 *Not included in total MAR Hold      1601 *Not included in total MAR Unhold               dextrose 50 % injection 12.5 g (g) Total dose:  Cannot be calculated* Dosing weight:  68   *Administration dose not documented     Date/Time Rate/Dose/Volume Action       04/26/24  1205 *Not included in total MAR Hold      1601 *Not included in total MAR Unhold               insulin lispro (HumaLOG) injection 0-5 Units (Units) Total dose:  2 Units Dosing weight:  68      Date/Time Rate/Dose/Volume Action       04/23/24  0800 *Not included in total Missed      1200 *Not included in total Missed      1700 *Not included in total Missed     04/24/24  0747 *Not included in total Missed      1235 2 Units Given      1700 *Not included in total Missed     04/25/24  0800 *Not included in total Missed      1200 *Not included in total Missed      1700 *Not included in total Missed     04/26/24  0800 *Not included in total Missed      1200 *Not included in total Missed      1205 *Not included in total MAR Hold      1601 *Not included in total MAR Unhold      1700 *Not included in total Missed     04/27/24  0800 *Not included in total Missed      1200 *Not included in total Missed               ipratropium-albuteroL (Duo-Neb) 0.5-2.5 mg/3 mL nebulizer solution 3 mL (mL) Total dose:  Cannot be calculated* Dosing weight:   68   *Administration dose not documented     Date/Time Rate/Dose/Volume Action       04/26/24  1205 *Not included in total MAR Hold      1601 *Not included in total MAR Unhold               aspirin chewable tablet 81 mg (mg) Total dose:  243 mg* Dosing weight:  68   *Administration not included in total     Date/Time Rate/Dose/Volume Action       04/23/24  0913 81 mg Given     04/24/24  0818 81 mg Given     04/25/24  0900 *81 mg Missed     04/26/24  0900 *81 mg Missed      1205 *Not included in total MAR Hold      1601 *Not included in total MAR Unhold     04/27/24  0841 81 mg Given               bacitracin ointment  - Omnicell Override Pull Total dose:  Cannot be calculated*   *Administration dose not documented     Date/Time Rate/Dose/Volume Action       04/22/24  2340 *Not included in total Missed               neomycin-bacitracin-polymyxin (Neosporin) ointment foil packet (Application) Total dose:  40 Application Dosing weight:  68      Date/Time Rate/Dose/Volume Action       04/23/24  0023 10 Application Given      1738 10 Application Given     04/24/24  0818 10 Application Given     04/25/24  0900 *Not included in total Missed     04/26/24  0900 *Not included in total Missed      1205 *Not included in total MAR Hold      1601 *Not included in total MAR Unhold     04/27/24  0843 10 Application Given               acetaminophen (Tylenol) tablet 650 mg (mg) Total dose:  Cannot be calculated* Dosing weight:  68   *Administration dose not documented     Date/Time Rate/Dose/Volume Action       04/23/24  0114 *650 mg Missed     04/26/24  1205 *Not included in total MAR Hold      1601 *Not included in total MAR Unhold               acetaminophen (Tylenol) tablet 650 mg (mg) Total dose:  650 mg* Dosing weight:  68   *Administration not included in total     Date/Time Rate/Dose/Volume Action       04/26/24  1630 *650 mg Missed      2330 650 mg Given     04/27/24  0830 *650 mg Missed               furosemide (Lasix)  injection 80 mg (mg) Total dose:  80 mg Dosing weight:  68      Date/Time Rate/Dose/Volume Action       04/23/24  0700 80 mg Given               HYDROmorphone PF (Dilaudid) injection 0.2 mg (mg) Total dose:  1 mg* Dosing weight:  68   *Administration not included in total     Date/Time Rate/Dose/Volume Action       04/25/24  0345 0.2 mg Given      2104 0.2 mg Given     04/26/24  0341 0.2 mg Given      1205 *Not included in total MAR Hold      1444 *0.2 mg Missed      1601 *Not included in total MAR Unhold      1625 0.2 mg Given      2035 0.2 mg Given     04/27/24  0400 *0.2 mg Missed               Tc-99m-mebrofenin (Choletec) injection 5 millicurie (millicurie) Total dose:  5 millicurie Dosing weight:  68      Date/Time Rate/Dose/Volume Action       04/25/24  0945 5 millicurie Given               metroNIDAZOLE (Flagyl) 500 mg in NaCl (iso-os) 100 mL (mg) Total dose:  3,000 mg* Dosing weight:  68   *From user-documented volume     Date/Time Rate/Dose/Volume Action       04/25/24  1639 500 mg (over 60 min) - 100 mL New Bag      1739  (over 60 min) Stopped     04/26/24  0106 500 mg (over 60 min) New Bag      0206  (over 60 min) Stopped      1205 *Not included in total MAR Hold      1601 *Not included in total MAR Unhold      1607 500 mg (over 60 min) - 300 mL New Bag      1707  (over 60 min) Stopped      2330 500 mg (over 60 min) New Bag     04/27/24  0030  (over 60 min) Stopped      0909 500 mg (over 60 min) New Bag      1000 200 mL       1009  (over 60 min) Stopped               ciprofloxacin (Cipro) IVPB 400 mg (mL/hr) Total dose:  400 mg* Dosing weight:  68   *From user-documented volume     Date/Time Rate/Dose/Volume Action       04/25/24  1802 400 mg - 200 mL/hr (over 60 min) - 200 mL New Bag      1902  (over 60 min) Stopped     04/26/24  1205 *Not included in total MAR Hold      1601 *Not included in total MAR Unhold      1827 400 mg - 200 mL/hr (over 60 min) New Bag      1927  (over 60 min) Stopped                sodium chloride 0.9% infusion (mL/hr) Total volume:  656.67 mL* Dosing weight:  68   *From user-documented volume     Date/Time Rate/Dose/Volume Action       04/26/24  1214 50 mL/hr New Bag      1422 600 mL       1530  Stopped      2121 56.67 mL                famotidine PF (Pepcid) injection 20 mg (mg) Total dose:  20 mg Dosing weight:  68      Date/Time Rate/Dose/Volume Action       04/26/24  1214 20 mg Given               lactated Ringer's infusion (mL/hr) Total volume:  Not documented* Dosing weight:  68   *Total volume has not been documented. View each administration to see the amount administered.     Date/Time Rate/Dose/Volume Action       04/26/24  1500 *100 mL/hr Missed               HYDROmorphone (Dilaudid) injection 0.5 mg (mg) Total dose:  0.5 mg Dosing weight:  68      Date/Time Rate/Dose/Volume Action       04/26/24  1445 0.5 mg Given               lidocaine 4 % patch 1 patch (patch) Total dose:  0 patch* Dosing weight:  68   *Administration not included in total     Date/Time Rate/Dose/Volume Action       04/26/24  1630 *1 patch (over 720 min) Missed     04/27/24  0900 *1 patch (over 720 min) Missed               HYDROmorphone (Dilaudid) tablet 1 mg (mg) Total dose:  1 mg Dosing weight:  68      Date/Time Rate/Dose/Volume Action       04/26/24  1823 1 mg Given               HYDROmorphone (Dilaudid) tablet 2 mg (mg) Total dose:  2 mg Dosing weight:  68      Date/Time Rate/Dose/Volume Action       04/27/24  0028 2 mg Given               diphenhydrAMINE (BENADryl) liquid 12.5 mg (mg) Total dose:  12.5 mg Dosing weight:  68      Date/Time Rate/Dose/Volume Action       04/26/24  2027 12.5 mg Given               oxygen (O2) therapy (L/min) Total volume:  Not documented*   *Total volume has not been documented. View each administration to see the amount administered.     Date/Time Rate/Dose/Volume Action       04/27/24  1417 2 L/min - 120,000 mL/hr New Bag               lidocaine PF (Xylocaine) 20  mg/mL (2 %) injection (mL) Total volume:  10 mL      Date/Time Rate/Dose/Volume Action       04/27/24  1419 10 mL Given               fentaNYL PF (Sublimaze) injection (mcg) Total dose:  25 mcg      Date/Time Rate/Dose/Volume Action       04/27/24  1419 25 mcg Given               midazolam (Versed) injection (mg) Total dose:  0.5 mg      Date/Time Rate/Dose/Volume Action       04/27/24  1420 0.5 mg Given                     See detailed result report with images in PACS.    The patient tolerated the procedure well without incident or complication and is in stable condition.

## 2024-04-27 NOTE — PROGRESS NOTES
Tana Hargrove is a 79 y.o. female on day 5 of admission presenting with Fall, initial encounter.      Subjective   79F hx CAD, HTN, COPD, ESRD on HD via permacath, DM2, MDS, gout, neuropathy presents for fall     Patient thinks that she slipped on the floor this afternoon and fell backwards because of that but unsure. thinks knees may have given out as well. felt well after her hospitalization w/o abd pain, n/v, generalized or focal weakness, f/c. has been eating ok. denies dizziness or syncope/loss of consciousness. fell back and hit her buttocks and head. head bled for awhile, now self-resolved w/o sutures. no pain in buttocks/back. no other injuries. not able to get up on own until got help 4h later. no recent cp, sob, diarrhea, dysuria, ha, blurry vision, numbness. taking asa.     4/23: Patient was seen and examined.  She is awake and alert and interactive today.  Multiple changes of bandage dressing due to soaked with blood.  Being transfused with 1 unit of packed red blood cells and 1 unit coming up later today.  Will repeat H&H likely transfuse with 10 units.  Patient completed hemodialysis later today.  PT OT to evaluate for functional status.  Repeat H&H at 6 PM.  Repeat CBC and BMP.  4/24: Patient was seen and examined.  She had an episode of continued bleeding overnight and required more stable on the scalp in order to achieve hemostasis.  I will consult general/trauma surgery to evaluate scalp staples.  Trend CBC and transfuse with hemoglobin less than 7.  Seen by PT OT who recommend home with home health.  Potential discharge within the next 24 to 48 hours  4/25: Patient was seen and examined.  Developed right upper quadrant pain overnight after eating a hamburger for dinner.  Hemoglobin has remained stable at 7.6 this morning.  Seen by general surgery who has ordered a HIDA scan.  General surgery to consider cholecystectomy during this admission.  4/26: Patient was seen and examined.  HIDA scan  yesterday showed signs of acute cholecystitis.  Hemoglobin still borderline at 7.1 but holding.  Was seen by cardiology and I spoke with cardiology who placed her at moderate to high risk of surgery however no further testing will impact this risk.  Possible cholecystectomy later today.  Consider transfusion of 1 more unit of packed red blood cells intraoperatively and within the next 24 hours.  Repeat CBC and BMP in a.m.  4/27: Patient is awaiting cholecystostomy tube today we will have nursing call surgery and also call down to specialist to see what the timing of her cholecystostomy tube is.  Clinically looks to be doing well otherwise but very tender in the right upper quadrant.      Objective     Last Recorded Vitals  /73 (BP Location: Left arm, Patient Position: Lying)   Pulse 82   Temp 36.2 °C (97.2 °F) (Temporal)   Resp 18   Wt 68 kg (150 lb)   SpO2 96%   Intake/Output last 3 Shifts:    Intake/Output Summary (Last 24 hours) at 4/27/2024 1321  Last data filed at 4/27/2024 1141  Gross per 24 hour   Intake 2591.67 ml   Output 200 ml   Net 2391.67 ml       Admission Weight  Weight: 68 kg (150 lb) (04/22/24 1443)    Daily Weight  04/22/24 : 68 kg (150 lb)    Image Results  NM hepatobiliary  Narrative: Interpreted By:  Leah Wiley and Maltbie Grace   STUDY:  NM HEPATOBILIARY;  4/25/2024 12:15 pm      INDICATION:  Signs/Symptoms:Right upper quadrant pain.      COMPARISON:  None.      ACCESSION NUMBER(S):  [id number]      ORDERING CLINICIAN:  SANDY GOLDEN      TECHNIQUE:  DIVISION OF NUCLEAR MEDICINE  HEPATOBILIARY SCAN (HIDA)      The patient received an intravenous dose of  5.0 mCi of Tc-99m  mebrofenin (Choletec).  Sequential images of the upper abdomen were  then acquired over the next 120 minutes.      FINDINGS:  There is prompt accumulation of activity within the liver and normal  subsequent excretion via the biliary ductal system into the small  bowel. Nonvisualization of gallbladder by 2  hours after radiotracer  injection suggestive of cystic duct obstruction..      Impression: 1. Nonvisualization of the gallbladder by 2 hours after radiotracer  injection, compatible with cystic duct obstruction and acute  cholecystitis.      Findings were communicated with Dr. Adina Kumari by Dr. Day Wheeler via epic secure chat at 12:27 pm on 4/25/2024 with  verification.      I personally reviewed the images/study and I agree with the findings  as stated by Nuclear Medicine fellow Day Wheeler MD. This study  was interpreted at [place], [place].      MACRO:  Critical Finding:  See findings. Notification was initiated on  4/25/2024 at 12:27 pm by  Day Wheeler.  (**-YCF-**) Instructions:      Signed by: Leah Wiley 4/25/2024 1:13 PM  Dictation workstation:   [id number]      Physical Exam  General: NAD, well-appearing, cooperative. no jaundice, pallor, rash, bruises  HEENT: NC/AT, MMM, no oral lesions or pharyngeal erythema. PERRL. no scleral icterus or conjunctival injection. neck soft w/o masses or LAD. superficial scalp lac well-approximated. no active bleed  CV: RRR, no murmurs, rubs, or gallops. No JVD.  Chest: breathing unlabored. CTAB w/ adequate air-entry. permacath c/d/i  Abd: Pain in the right upper quadrant   Extr: wwp, 2+ and symmetric peripheral pulses. no LE edema.  Neuro: AAOx3. CNII-XII intact. no focal findings.       Relevant Results             Scheduled medications  acetaminophen, 650 mg, oral, q8h  allopurinol, 100 mg, oral, Daily  amLODIPine, 5 mg, oral, Daily  aspirin, 81 mg, oral, Daily  cholecalciferol, 2,000 Units, oral, Daily  ciprofloxacin, 400 mg, intravenous, q24h  cyanocobalamin, 1,000 mcg, oral, Daily  heparin (porcine), 5,000 Units, subcutaneous, q12h  insulin lispro, 0-5 Units, subcutaneous, TID with meals  lidocaine, 1 patch, transdermal, Daily  metoprolol tartrate, 25 mg, oral, BID  metroNIDAZOLE, 500 mg, intravenous,  q8h  neomycin-bacitracnZn-polymyxnB, , Topical, Daily  pravastatin, 40 mg, oral, Nightly  pregabalin, 75 mg, oral, BID  sevelamer carbonate, 800 mg, oral, TID with meals      Continuous medications     PRN medications  PRN medications: dextrose, dextrose, diphenhydrAMINE, glucagon, glucagon, HYDROmorphone, HYDROmorphone, HYDROmorphone, ipratropium-albuteroL, melatonin, ondansetron **OR** ondansetron, polyethylene glycol    Assessment/Plan        Ground-level fall mechanical  Left partial scalp lacerations status post staples  Cholecystitis chronic  End-stage renal disease on hemodialysis  History of gout  Type 2 diabetes  Neuropathy  Hypertension  Coronary disease  DVT prophylaxis-SCDs subcu heparin    Plan cholecystostomy tube today  Dialysis per per nephrology Monday Wednesday Friday  Status post 2 staples of the scalp  Contact: tremaine daniel [phone]  Check labs in a.m.   see orders for complete plan                        Spent 35 minutes in the follow-up management of this patient today.    Warren Head MD

## 2024-04-27 NOTE — CARE PLAN
"The patient's goals for the shift include \"For my head to stop bleeding and to sleep.\"    The clinical goals for the shift include Patient will remain free from falls this shift.    Over the shift, the patient remains free from falls.  "

## 2024-04-27 NOTE — PROGRESS NOTES
"GENERAL SURGERY PROGRESS NOTE    Tana Hargrove   1945   [id number]     Tana Hargrove is a 79 y.o. female on day 5 of admission presenting with Fall, initial encounter.    Diagnostic Laparoscopy, lysis of adhesions on 4/26/24, 1 Day Post-Op    Subjective  No acute events overnight. Laparoscopic cholecystectomy aborted yesterday due to extensive adhesions, plan for IR placed PCT today. Patient continues to have right upper quadrant pain. Reports feeling sore from surgery. Denies nausea.    Review of Systems:  Review of Systems   Constitutional:  Negative for chills and fever.   Respiratory:  Negative for chest tightness and shortness of breath.    Cardiovascular:  Negative for chest pain and leg swelling.   Gastrointestinal:  Positive for abdominal pain. Negative for nausea.   Musculoskeletal:  Negative for joint swelling.   Skin:  Negative for rash.   Neurological:  Negative for light-headedness.       Objective    Last Recorded Vitals  Blood pressure 149/68, pulse 67, temperature 36.8 °C (98.2 °F), resp. rate 18, height 1.549 m (5' 1\"), weight 68 kg (150 lb), SpO2 93%.    Intake/Output last 3 Shifts:  I/O last 3 completed shifts:  In: 1876.7 (27.6 mL/kg) [P.O.:480; I.V.:1096.7 (16.1 mL/kg); IV Piggyback:300]  Out: 1050 (15.4 mL/kg) [Other:1000; Blood:50]  Weight: 68 kg     Intake/Output Summary (Last 24 hours) at 4/27/2024 0842  Last data filed at 4/27/2024 0747  Gross per 24 hour   Intake 1876.67 ml   Output 1050 ml   Net 826.67 ml       Physical Exam  Constitutional:       General: She is not in acute distress.  HENT:      Head: Normocephalic.      Comments: Posterior scalp laceration repaired with staples in place  Eyes:      Extraocular Movements: Extraocular movements intact.      Conjunctiva/sclera: Conjunctivae normal.   Cardiovascular:      Rate and Rhythm: Normal rate and regular rhythm.      Pulses: Normal pulses.   Pulmonary:      Effort: Pulmonary effort is normal.   Abdominal:      Palpations: " Abdomen is soft.      Comments: Tender in right upper quadrant. Incisions well approximated, no erythema or drainage   Musculoskeletal:         General: No swelling. Normal range of motion.      Cervical back: Neck supple.   Skin:     General: Skin is warm and dry.   Neurological:      General: No focal deficit present.      Mental Status: She is alert and oriented to person, place, and time.         Relevant Results  Labs:  Results for orders placed or performed during the hospital encounter of 04/22/24 (from the past 24 hour(s))   POCT GLUCOSE   Result Value Ref Range    POCT Glucose 103 (H) 74 - 99 mg/dL   POCT GLUCOSE   Result Value Ref Range    POCT Glucose 186 (H) 74 - 99 mg/dL   POCT GLUCOSE   Result Value Ref Range    POCT Glucose 173 (H) 74 - 99 mg/dL   POCT GLUCOSE   Result Value Ref Range    POCT Glucose 178 (H) 74 - 99 mg/dL   CBC and Auto Differential   Result Value Ref Range    WBC 5.2 4.4 - 11.3 x10*3/uL    nRBC 0.0 0.0 - 0.0 /100 WBCs    RBC 2.16 (L) 4.00 - 5.20 x10*6/uL    Hemoglobin 6.4 (LL) 12.0 - 16.0 g/dL    Hematocrit 20.6 (L) 36.0 - 46.0 %    MCV 95 80 - 100 fL    MCH 29.6 26.0 - 34.0 pg    MCHC 31.1 (L) 32.0 - 36.0 g/dL    RDW 23.5 (H) 11.5 - 14.5 %    Platelets 203 150 - 450 x10*3/uL    Neutrophils % 77.6 40.0 - 80.0 %    Immature Granulocytes %, Automated 0.4 0.0 - 0.9 %    Lymphocytes % 14.3 13.0 - 44.0 %    Monocytes % 7.3 2.0 - 10.0 %    Eosinophils % 0.0 0.0 - 6.0 %    Basophils % 0.4 0.0 - 2.0 %    Neutrophils Absolute 4.03 1.60 - 5.50 x10*3/uL    Immature Granulocytes Absolute, Automated 0.02 0.00 - 0.50 x10*3/uL    Lymphocytes Absolute 0.74 (L) 0.80 - 3.00 x10*3/uL    Monocytes Absolute 0.38 0.05 - 0.80 x10*3/uL    Eosinophils Absolute 0.00 0.00 - 0.40 x10*3/uL    Basophils Absolute 0.02 0.00 - 0.10 x10*3/uL   Basic Metabolic Panel   Result Value Ref Range    Glucose 146 (H) 74 - 99 mg/dL    Sodium 135 (L) 136 - 145 mmol/L    Potassium 4.4 3.5 - 5.3 mmol/L    Chloride 102 98 - 107  mmol/L    Bicarbonate 27 21 - 32 mmol/L    Anion Gap 10 10 - 20 mmol/L    Urea Nitrogen 16 6 - 23 mg/dL    Creatinine 1.97 (H) 0.50 - 1.05 mg/dL    eGFR 25 (L) >60 mL/min/1.73m*2    Calcium 8.3 (L) 8.6 - 10.3 mg/dL   Magnesium   Result Value Ref Range    Magnesium 1.72 1.60 - 2.40 mg/dL   Phosphorus   Result Value Ref Range    Phosphorus 4.3 2.5 - 4.9 mg/dL   Hepatic function panel   Result Value Ref Range    Albumin 3.2 (L) 3.4 - 5.0 g/dL    Bilirubin, Total 0.5 0.0 - 1.2 mg/dL    Bilirubin, Direct 0.1 0.0 - 0.3 mg/dL    Alkaline Phosphatase 85 33 - 136 U/L    ALT 14 7 - 45 U/L    AST 16 9 - 39 U/L    Total Protein 5.3 (L) 6.4 - 8.2 g/dL   Protime-INR   Result Value Ref Range    Protime 12.9 (H) 9.8 - 12.8 seconds    INR 1.1 0.9 - 1.1   APTT   Result Value Ref Range    aPTT 28 27 - 38 seconds   Morphology   Result Value Ref Range    RBC Morphology See Below     Polychromasia Mild     Hypochromia Mild     Target Cells Few     Teardrop Cells Few     Basophilic Stippling Present    Prepare RBC: 1 Units   Result Value Ref Range    PRODUCT CODE S5905S67     Unit Number [id number]     Unit ABO A     Unit RH NEG     XM INTEP COMP     Dispense Status IS     Blood Expiration Date May 09, 2024 23:59 EDT     PRODUCT BLOOD TYPE 0600     UNIT VOLUME 283    POCT GLUCOSE   Result Value Ref Range    POCT Glucose 110 (H) 74 - 99 mg/dL     *Note: Due to a large number of results and/or encounters for the requested time period, some results have not been displayed. A complete set of results can be found in Results Review.       Images:  NM hepatobiliary   Final Result   1. Nonvisualization of the gallbladder by 2 hours after radiotracer   injection, compatible with cystic duct obstruction and acute   cholecystitis.        Findings were communicated with Dr. Adina Kumari by Dr. Day Wheeler via epic secure chat at 12:27 pm on 4/25/2024 with   verification.        I personally reviewed the images/study and I agree with the  findings   as stated by Nuclear Medicine fellow Day Wheeler MD. This study   was interpreted at [facility],   [place], [place].        MACRO:   Critical Finding:  See findings. Notification was initiated on   4/25/2024 at 12:27 pm by  Day Wheeler.  (**-YCF-**) Instructions:        Signed by: Leah Wiley 4/25/2024 1:13 PM   Dictation workstation:   [id number]      XR chest 1 view   Final Result   1.  No radiographic evidence of acute cardiopulmonary process.                  MACRO:   None.        Signed by: Rashida Sanchez 4/23/2024 6:44 AM   Dictation workstation:   [id number]      XR pelvis 1-2 views   Final Result   1.  No radiographic evidence for acute fracture.                  MACRO:   None.        Signed by: Rashida Sanchez 4/23/2024 6:40 AM   Dictation workstation:   [id number]      CT cervical spine wo IV contrast   Final Result        High posterior left parietal scalp laceration with small scalp   hematoma. No depressed skull fracture. No acute intracranial bleed or   focal mass effect.        Moderate volume loss in the brain.        No CT evidence of cervical spine fracture in this exam.        Cervical spine DJD as described.        Ground-glass infiltrative nodular density in the posterior right lung   apex is stable to slightly larger compared to CT scan chest from   04/13/2024. This was not present back on 04/06/2022. Small pneumonia   versus developing infiltrative tumor. Consider further evaluation   with PET-CT scan.        MACRO:   None        Signed by: Saurabh Suarez 4/22/2024 4:58 PM   Dictation workstation:   [id number]      CT head wo IV contrast   Final Result        High posterior left parietal scalp laceration with small scalp   hematoma. No depressed skull fracture. No acute intracranial bleed or   focal mass effect.        Moderate volume loss in the brain.        No CT evidence of cervical spine fracture in this exam.        Cervical spine  DJD as described.        Ground-glass infiltrative nodular density in the posterior right lung   apex is stable to slightly larger compared to CT scan chest from   04/13/2024. This was not present back on 04/06/2022. Small pneumonia   versus developing infiltrative tumor. Consider further evaluation   with PET-CT scan.        MACRO:   None        Signed by: Saurabh Suarez 4/22/2024 4:58 PM   Dictation workstation:   [id number]      Consult to Interventional Radiology    (Results Pending)       Assessment and Plan  Active Problems:    Thickening of wall of gallbladder    Chronic cholecystitis    79 y.o. female with history of CAD, HTN, COPD, ESRD on HD, DM2, MDS, gout, neuropathy who presented following a mechanical fall and sustaining a posterior scalp laceration who developed right upper quadrant abdominal pain, HIDA consistent with acute cholecystitis, s/p lap KAREN 4/26, lap chidi aborted due to extensive adhesions.    Plan:  -Hgb 6.4 this AM, received 1U pRBC  -Plan for IR for percutaneous cholecystostomy tube, will happen this afternoon per Dr. House with IR  -NPO for procedure  -Can have CLD post-procedure  -Scalp laceration staples to be removed in 7-10 days  -Other cares per primary team    Discussed with attending Dr. Delvin Edwards,  - PGY3  General Surgery

## 2024-04-27 NOTE — PROGRESS NOTES
Nephrology Progress Note    Following for ESRD.  Patient complains of right upper quadrant abdominal pain where cholecystostomy tube was placed.  The patient denies chest pain or shortness of breath.        Current Inpatient Medications:  Current Facility-Administered Medications Ordered in Epic   Medication Dose Route Frequency Provider Last Rate Last Admin    acetaminophen (Tylenol) tablet 650 mg  650 mg oral q8h Ronald Grimes MD   650 mg at 04/26/24 2330    allopurinol (Zyloprim) tablet 100 mg  100 mg oral Daily Ronald Grimes MD   100 mg at 04/27/24 0841    amLODIPine (Norvasc) tablet 5 mg  5 mg oral Daily Ronald Grimes MD   5 mg at 04/27/24 0841    aspirin chewable tablet 81 mg  81 mg oral Daily Ronald Grimes MD   81 mg at 04/27/24 0841    cholecalciferol (Vitamin D-3) tablet 2,000 Units  2,000 Units oral Daily Ronald Grimes MD   2,000 Units at 04/27/24 0842    ciprofloxacin (Cipro) IVPB 400 mg  400 mg intravenous q24h Ronald Grimes MD   Stopped at 04/27/24 1811    cyanocobalamin (Vitamin B-12) tablet 1,000 mcg  1,000 mcg oral Daily Ronald Grimes MD   1,000 mcg at 04/27/24 0842    dextrose 50 % injection 12.5 g  12.5 g intravenous q15 min PRN Ronald Grimes MD        dextrose 50 % injection 25 g  25 g intravenous q15 min PRN Ronald Grimes MD        diphenhydrAMINE (BENADryl) liquid 12.5 mg  12.5 mg oral q8h PRN Ashwin P Anthony, DO   12.5 mg at 04/26/24 2027    glucagon (Glucagen) injection 1 mg  1 mg intramuscular q15 min PRN Ronald Grimes MD        glucagon (Glucagen) injection 1 mg  1 mg intramuscular q15 min PRN Ronald Grimes MD        heparin (porcine) injection 5,000 Units  5,000 Units subcutaneous q12h Ronald Grimes MD   5,000 Units at 04/27/24 0840    HYDROmorphone (Dilaudid) tablet 1 mg  1 mg oral q6h PRN Ronald Grimes MD   1 mg at 04/26/24 1823    HYDROmorphone (Dilaudid) tablet 2 mg  2 mg  "oral q6h PRN Ronald Grimes MD   2 mg at 04/27/24 0028    HYDROmorphone PF (Dilaudid) injection 0.2 mg  0.2 mg intravenous q4h PRN Ronald Grimes MD   0.2 mg at 04/26/24 2035    insulin lispro (HumaLOG) injection 0-5 Units  0-5 Units subcutaneous TID with meals Ronald Grimes MD   2 Units at 04/24/24 1235    ipratropium-albuteroL (Duo-Neb) 0.5-2.5 mg/3 mL nebulizer solution 3 mL  3 mL nebulization q6h PRN Ronald Grimes MD        lidocaine 4 % patch 1 patch  1 patch transdermal Daily Ronald Grimes MD        melatonin tablet 3 mg  3 mg oral Nightly PRN Ronald Grimes MD        metoprolol tartrate (Lopressor) tablet 25 mg  25 mg oral BID Ronald Grimes MD   25 mg at 04/27/24 0842    metroNIDAZOLE (Flagyl) 500 mg in NaCl (iso-os) 100 mL  500 mg intravenous q8h Ronald Grimes MD   Stopped at 04/27/24 1654    neomycin-bacitracin-polymyxin (Neosporin) ointment foil packet   Topical Daily Ronald Grimes MD   10 Application at 04/27/24 0843    ondansetron (Zofran) tablet 4 mg  4 mg oral q8h PRN Ronald Grimes MD        Or    ondansetron (Zofran) injection 4 mg  4 mg intravenous q8h PRN Ronald Grimes MD   4 mg at 04/26/24 1328    polyethylene glycol (Glycolax, Miralax) packet 17 g  17 g oral Daily PRN Ronald Grimes MD        pravastatin (Pravachol) tablet 40 mg  40 mg oral Nightly Ronald Grimes MD   40 mg at 04/26/24 2027    pregabalin (Lyrica) capsule 75 mg  75 mg oral BID Ronald Grimes MD   75 mg at 04/27/24 0841    sevelamer carbonate (Renvela) tablet 800 mg  800 mg oral TID with meals Ronald Grimes MD   800 mg at 04/27/24 1601     No current [facility]-ordered outpatient medications on file.         Vitals:   /63 (BP Location: Left arm, Patient Position: Lying)   Pulse 78   Temp 36.8 °C (98.2 °F) (Temporal)   Resp 18   Ht 1.549 m (5' 1\")   Wt 68 kg (150 lb)   SpO2 92%   BMI 28.34 kg/m² "   BLOOD PRESSURE RANGE:  Systolic (24hrs), Av , Min:121 , Max:175   ; Diastolic (24hrs), Av, Min:54, Max:74    24HR INTAKE/OUTPUT:    Intake/Output Summary (Last 24 hours) at 2024 1911  Last data filed at 2024 1825  Gross per 24 hour   Intake 3137.92 ml   Output 370 ml   Net 2767.92 ml       Physical exam:   Alert and oriented x 3 NAD  Neck: no JVD  CV: RRR  Lungs: CTA bilaterally  Abd: soft, NT, ND   Ext: no lower extremity edema        Data:   Labs:  Results for orders placed or performed during the hospital encounter of 24 (from the past 24 hour(s))   POCT GLUCOSE   Result Value Ref Range    POCT Glucose 178 (H) 74 - 99 mg/dL   CBC and Auto Differential   Result Value Ref Range    WBC 5.2 4.4 - 11.3 x10*3/uL    nRBC 0.0 0.0 - 0.0 /100 WBCs    RBC 2.16 (L) 4.00 - 5.20 x10*6/uL    Hemoglobin 6.4 (LL) 12.0 - 16.0 g/dL    Hematocrit 20.6 (L) 36.0 - 46.0 %    MCV 95 80 - 100 fL    MCH 29.6 26.0 - 34.0 pg    MCHC 31.1 (L) 32.0 - 36.0 g/dL    RDW 23.5 (H) 11.5 - 14.5 %    Platelets 203 150 - 450 x10*3/uL    Neutrophils % 77.6 40.0 - 80.0 %    Immature Granulocytes %, Automated 0.4 0.0 - 0.9 %    Lymphocytes % 14.3 13.0 - 44.0 %    Monocytes % 7.3 2.0 - 10.0 %    Eosinophils % 0.0 0.0 - 6.0 %    Basophils % 0.4 0.0 - 2.0 %    Neutrophils Absolute 4.03 1.60 - 5.50 x10*3/uL    Immature Granulocytes Absolute, Automated 0.02 0.00 - 0.50 x10*3/uL    Lymphocytes Absolute 0.74 (L) 0.80 - 3.00 x10*3/uL    Monocytes Absolute 0.38 0.05 - 0.80 x10*3/uL    Eosinophils Absolute 0.00 0.00 - 0.40 x10*3/uL    Basophils Absolute 0.02 0.00 - 0.10 x10*3/uL   Basic Metabolic Panel   Result Value Ref Range    Glucose 146 (H) 74 - 99 mg/dL    Sodium 135 (L) 136 - 145 mmol/L    Potassium 4.4 3.5 - 5.3 mmol/L    Chloride 102 98 - 107 mmol/L    Bicarbonate 27 21 - 32 mmol/L    Anion Gap 10 10 - 20 mmol/L    Urea Nitrogen 16 6 - 23 mg/dL    Creatinine 1.97 (H) 0.50 - 1.05 mg/dL    eGFR 25 (L) >60 mL/min/1.73m*2    Calcium  8.3 (L) 8.6 - 10.3 mg/dL   Magnesium   Result Value Ref Range    Magnesium 1.72 1.60 - 2.40 mg/dL   Phosphorus   Result Value Ref Range    Phosphorus 4.3 2.5 - 4.9 mg/dL   Hepatic function panel   Result Value Ref Range    Albumin 3.2 (L) 3.4 - 5.0 g/dL    Bilirubin, Total 0.5 0.0 - 1.2 mg/dL    Bilirubin, Direct 0.1 0.0 - 0.3 mg/dL    Alkaline Phosphatase 85 33 - 136 U/L    ALT 14 7 - 45 U/L    AST 16 9 - 39 U/L    Total Protein 5.3 (L) 6.4 - 8.2 g/dL   Protime-INR   Result Value Ref Range    Protime 12.9 (H) 9.8 - 12.8 seconds    INR 1.1 0.9 - 1.1   APTT   Result Value Ref Range    aPTT 28 27 - 38 seconds   Morphology   Result Value Ref Range    RBC Morphology See Below     Polychromasia Mild     Hypochromia Mild     Target Cells Few     Teardrop Cells Few     Basophilic Stippling Present    Prepare RBC: 1 Units   Result Value Ref Range    PRODUCT CODE C0810J46     Unit Number [id number]     Unit ABO A     Unit RH NEG     XM INTEP COMP     Dispense Status IS     Blood Expiration Date May 09, 2024 23:59 EDT     PRODUCT BLOOD TYPE 0600     UNIT VOLUME 283    POCT GLUCOSE   Result Value Ref Range    POCT Glucose 110 (H) 74 - 99 mg/dL   POCT GLUCOSE   Result Value Ref Range    POCT Glucose 119 (H) 74 - 99 mg/dL   POCT GLUCOSE   Result Value Ref Range    POCT Glucose 101 (H) 74 - 99 mg/dL     *Note: Due to a large number of results and/or encounters for the requested time period, some results have not been displayed. A complete set of results can be found in Results Review.            Assessment and Plan:  Patient is 79 y.o. female who is admitted to hospital for fall with acute blood loss anemia after scalp laceration. Nephrology consulted in view of ESRD.      ESRD- HD [facility]         Anemia acute on chronic with hx of MDS-   Has been getting Micera and iron at her HD unit. 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  No need for dialysis today.  Next dialysis is scheduled for 4/29/2024.  -Transferred to hemoglobin less than 7           Please do not hesitate to contact me at [phone] if there is any question or concern.   Samuel Espinosa MD (Ramy Pearce MD)

## 2024-04-28 LAB
ANION GAP SERPL CALC-SCNC: 11 MMOL/L (ref 10–20)
BASO STIPL BLD QL SMEAR: PRESENT
BASOPHILS # BLD AUTO: 0.02 X10*3/UL (ref 0–0.1)
BASOPHILS NFR BLD AUTO: 0.4 %
BLOOD EXPIRATION DATE: NORMAL
BUN SERPL-MCNC: 24 MG/DL (ref 6–23)
CALCIUM SERPL-MCNC: 8.7 MG/DL (ref 8.6–10.3)
CHLORIDE SERPL-SCNC: 103 MMOL/L (ref 98–107)
CO2 SERPL-SCNC: 26 MMOL/L (ref 21–32)
CREAT SERPL-MCNC: 2.64 MG/DL (ref 0.5–1.05)
DACRYOCYTES BLD QL SMEAR: NORMAL
DISPENSE STATUS: NORMAL
EGFRCR SERPLBLD CKD-EPI 2021: 18 ML/MIN/1.73M*2
EOSINOPHIL # BLD AUTO: 0.11 X10*3/UL (ref 0–0.4)
EOSINOPHIL NFR BLD AUTO: 2.3 %
ERYTHROCYTE [DISTWIDTH] IN BLOOD BY AUTOMATED COUNT: 23 % (ref 11.5–14.5)
GLUCOSE BLD MANUAL STRIP-MCNC: 114 MG/DL (ref 74–99)
GLUCOSE BLD MANUAL STRIP-MCNC: 132 MG/DL (ref 74–99)
GLUCOSE BLD MANUAL STRIP-MCNC: 149 MG/DL (ref 74–99)
GLUCOSE BLD MANUAL STRIP-MCNC: 186 MG/DL (ref 74–99)
GLUCOSE SERPL-MCNC: 107 MG/DL (ref 74–99)
HCT VFR BLD AUTO: 22.7 % (ref 36–46)
HGB BLD-MCNC: 7.5 G/DL (ref 12–16)
HYPOCHROMIA BLD QL SMEAR: NORMAL
IMM GRANULOCYTES # BLD AUTO: 0.02 X10*3/UL (ref 0–0.5)
IMM GRANULOCYTES NFR BLD AUTO: 0.4 % (ref 0–0.9)
LYMPHOCYTES # BLD AUTO: 1.48 X10*3/UL (ref 0.8–3)
LYMPHOCYTES NFR BLD AUTO: 30.8 %
MAGNESIUM SERPL-MCNC: 1.68 MG/DL (ref 1.6–2.4)
MCH RBC QN AUTO: 30.5 PG (ref 26–34)
MCHC RBC AUTO-ENTMCNC: 33 G/DL (ref 32–36)
MCV RBC AUTO: 92 FL (ref 80–100)
MONOCYTES # BLD AUTO: 0.37 X10*3/UL (ref 0.05–0.8)
MONOCYTES NFR BLD AUTO: 7.7 %
NEUTROPHILS # BLD AUTO: 2.81 X10*3/UL (ref 1.6–5.5)
NEUTROPHILS NFR BLD AUTO: 58.4 %
NRBC BLD-RTO: 0 /100 WBCS (ref 0–0)
OVALOCYTES BLD QL SMEAR: NORMAL
PLATELET # BLD AUTO: 172 X10*3/UL (ref 150–450)
POLYCHROMASIA BLD QL SMEAR: NORMAL
POTASSIUM SERPL-SCNC: 4.1 MMOL/L (ref 3.5–5.3)
PRODUCT BLOOD TYPE: 600
PRODUCT CODE: NORMAL
RBC # BLD AUTO: 2.46 X10*6/UL (ref 4–5.2)
RBC MORPH BLD: NORMAL
SODIUM SERPL-SCNC: 136 MMOL/L (ref 136–145)
TARGETS BLD QL SMEAR: NORMAL
UNIT ABO: NORMAL
UNIT NUMBER: NORMAL
UNIT RH: NORMAL
UNIT VOLUME: 283
WBC # BLD AUTO: 4.8 X10*3/UL (ref 4.4–11.3)
XM INTEP: NORMAL

## 2024-04-28 PROCEDURE — 83735 ASSAY OF MAGNESIUM: CPT | Performed by: INTERNAL MEDICINE

## 2024-04-28 PROCEDURE — 1200000002 HC GENERAL ROOM WITH TELEMETRY DAILY

## 2024-04-28 PROCEDURE — 99024 POSTOP FOLLOW-UP VISIT: CPT | Performed by: SURGERY

## 2024-04-28 PROCEDURE — 82947 ASSAY GLUCOSE BLOOD QUANT: CPT | Mod: 91

## 2024-04-28 PROCEDURE — 36415 COLL VENOUS BLD VENIPUNCTURE: CPT | Performed by: INTERNAL MEDICINE

## 2024-04-28 PROCEDURE — 2500000001 HC RX 250 WO HCPCS SELF ADMINISTERED DRUGS (ALT 637 FOR MEDICARE OP): Performed by: INTERNAL MEDICINE

## 2024-04-28 PROCEDURE — 2500000001 HC RX 250 WO HCPCS SELF ADMINISTERED DRUGS (ALT 637 FOR MEDICARE OP): Performed by: SURGERY

## 2024-04-28 PROCEDURE — 80048 BASIC METABOLIC PNL TOTAL CA: CPT | Performed by: INTERNAL MEDICINE

## 2024-04-28 PROCEDURE — 2500000004 HC RX 250 GENERAL PHARMACY W/ HCPCS (ALT 636 FOR OP/ED): Mod: JZ | Performed by: SURGERY

## 2024-04-28 PROCEDURE — 2500000004 HC RX 250 GENERAL PHARMACY W/ HCPCS (ALT 636 FOR OP/ED): Performed by: SURGERY

## 2024-04-28 PROCEDURE — 2500000005 HC RX 250 GENERAL PHARMACY W/O HCPCS: Performed by: SURGERY

## 2024-04-28 PROCEDURE — 2500000006 HC RX 250 W HCPCS SELF ADMINISTERED DRUGS (ALT 637 FOR ALL PAYERS): Performed by: SURGERY

## 2024-04-28 PROCEDURE — 85025 COMPLETE CBC W/AUTO DIFF WBC: CPT | Performed by: INTERNAL MEDICINE

## 2024-04-28 RX ORDER — COLCHICINE 0.6 MG/1
1.2 TABLET ORAL DAILY
Status: COMPLETED | OUTPATIENT
Start: 2024-04-28 | End: 2024-04-30

## 2024-04-28 RX ADMIN — Medication 2000 UNITS: at 09:15

## 2024-04-28 RX ADMIN — METRONIDAZOLE 500 MG: 500 INJECTION, SOLUTION INTRAVENOUS at 00:19

## 2024-04-28 RX ADMIN — SEVELAMER CARBONATE 800 MG: 800 TABLET, FILM COATED ORAL at 17:24

## 2024-04-28 RX ADMIN — BACITRACIN ZINC, NEOMYCIN, POLYMYXIN B 10 APPLICATION: 400; 3.5; 5 OINTMENT TOPICAL at 09:14

## 2024-04-28 RX ADMIN — HYDROMORPHONE HYDROCHLORIDE 2 MG: 2 TABLET ORAL at 05:58

## 2024-04-28 RX ADMIN — PRAVASTATIN SODIUM 40 MG: 40 TABLET ORAL at 21:07

## 2024-04-28 RX ADMIN — COLCHICINE 1.2 MG: 0.6 TABLET ORAL at 17:24

## 2024-04-28 RX ADMIN — PREGABALIN 75 MG: 75 CAPSULE ORAL at 09:15

## 2024-04-28 RX ADMIN — CIPROFLOXACIN 400 MG: 400 INJECTION, SOLUTION INTRAVENOUS at 17:20

## 2024-04-28 RX ADMIN — AMLODIPINE BESYLATE 5 MG: 5 TABLET ORAL at 09:16

## 2024-04-28 RX ADMIN — PREGABALIN 75 MG: 75 CAPSULE ORAL at 21:07

## 2024-04-28 RX ADMIN — METOPROLOL TARTRATE 25 MG: 25 TABLET, FILM COATED ORAL at 21:00

## 2024-04-28 RX ADMIN — ACETAMINOPHEN 650 MG: 325 TABLET ORAL at 00:19

## 2024-04-28 RX ADMIN — CYANOCOBALAMIN TAB 1000 MCG 1000 MCG: 1000 TAB at 09:16

## 2024-04-28 RX ADMIN — ALLOPURINOL 100 MG: 100 TABLET ORAL at 09:17

## 2024-04-28 RX ADMIN — SEVELAMER CARBONATE 800 MG: 800 TABLET, FILM COATED ORAL at 09:18

## 2024-04-28 RX ADMIN — HEPARIN SODIUM 5000 UNITS: 5000 INJECTION INTRAVENOUS; SUBCUTANEOUS at 09:16

## 2024-04-28 RX ADMIN — METRONIDAZOLE 500 MG: 500 INJECTION, SOLUTION INTRAVENOUS at 09:20

## 2024-04-28 RX ADMIN — METOPROLOL TARTRATE 25 MG: 25 TABLET, FILM COATED ORAL at 09:16

## 2024-04-28 RX ADMIN — HYDROMORPHONE HYDROCHLORIDE 2 MG: 2 TABLET ORAL at 15:27

## 2024-04-28 RX ADMIN — METRONIDAZOLE 500 MG: 500 INJECTION, SOLUTION INTRAVENOUS at 15:31

## 2024-04-28 RX ADMIN — ASPIRIN 81 MG CHEWABLE TABLET 81 MG: 81 TABLET CHEWABLE at 09:14

## 2024-04-28 ASSESSMENT — COGNITIVE AND FUNCTIONAL STATUS - GENERAL
PERSONAL GROOMING: A LITTLE
EATING MEALS: A LITTLE
EATING MEALS: A LITTLE
PERSONAL GROOMING: A LITTLE
CLIMB 3 TO 5 STEPS WITH RAILING: A LOT
DRESSING REGULAR UPPER BODY CLOTHING: A LITTLE
MOVING FROM LYING ON BACK TO SITTING ON SIDE OF FLAT BED WITH BEDRAILS: A LITTLE
HELP NEEDED FOR BATHING: A LITTLE
STANDING UP FROM CHAIR USING ARMS: A LITTLE
MOVING FROM LYING ON BACK TO SITTING ON SIDE OF FLAT BED WITH BEDRAILS: A LITTLE
TOILETING: A LITTLE
TURNING FROM BACK TO SIDE WHILE IN FLAT BAD: A LITTLE
WALKING IN HOSPITAL ROOM: A LITTLE
MOVING TO AND FROM BED TO CHAIR: A LITTLE
CLIMB 3 TO 5 STEPS WITH RAILING: A LOT
DAILY ACTIVITIY SCORE: 19
TURNING FROM BACK TO SIDE WHILE IN FLAT BAD: A LITTLE
WALKING IN HOSPITAL ROOM: A LITTLE
HELP NEEDED FOR BATHING: A LITTLE
MOBILITY SCORE: 17
STANDING UP FROM CHAIR USING ARMS: A LITTLE
MOBILITY SCORE: 17
DRESSING REGULAR UPPER BODY CLOTHING: A LITTLE
TOILETING: A LITTLE
DAILY ACTIVITIY SCORE: 19
MOVING TO AND FROM BED TO CHAIR: A LITTLE

## 2024-04-28 ASSESSMENT — PAIN DESCRIPTION - LOCATION
LOCATION: ABDOMEN
LOCATION: ABDOMEN

## 2024-04-28 ASSESSMENT — PAIN DESCRIPTION - DESCRIPTORS
DESCRIPTORS: ACHING
DESCRIPTORS: ACHING

## 2024-04-28 ASSESSMENT — ENCOUNTER SYMPTOMS
SHORTNESS OF BREATH: 0
CHEST TIGHTNESS: 0
ABDOMINAL PAIN: 1
LIGHT-HEADEDNESS: 0
NAUSEA: 0
JOINT SWELLING: 0
CHILLS: 0
FEVER: 0

## 2024-04-28 ASSESSMENT — PAIN - FUNCTIONAL ASSESSMENT
PAIN_FUNCTIONAL_ASSESSMENT: 0-10

## 2024-04-28 ASSESSMENT — PAIN SCALES - GENERAL
PAINLEVEL_OUTOF10: 0 - NO PAIN
PAINLEVEL_OUTOF10: 3
PAINLEVEL_OUTOF10: 8
PAINLEVEL_OUTOF10: 7
PAINLEVEL_OUTOF10: 0 - NO PAIN

## 2024-04-28 ASSESSMENT — PAIN DESCRIPTION - ORIENTATION: ORIENTATION: RIGHT

## 2024-04-28 NOTE — PROGRESS NOTES
Nephrology Progress Note    Following for ESRD.  Patient complains of right upper quadrant abdominal pain where cholecystostomy tube was placed.  Pain has improved since yesterday.  The patient denies chest pain or shortness of breath.        Current Inpatient Medications:  Current Facility-Administered Medications Ordered in Epic   Medication Dose Route Frequency Provider Last Rate Last Admin    acetaminophen (Tylenol) tablet 650 mg  650 mg oral q8h Ronald Grimes MD   650 mg at 04/28/24 0019    allopurinol (Zyloprim) tablet 100 mg  100 mg oral Daily Ronald Grimes MD   100 mg at 04/28/24 0917    amLODIPine (Norvasc) tablet 5 mg  5 mg oral Daily Ronald Grimes MD   5 mg at 04/28/24 0916    aspirin chewable tablet 81 mg  81 mg oral Daily Ronald Grimes MD   81 mg at 04/28/24 0914    cholecalciferol (Vitamin D-3) tablet 2,000 Units  2,000 Units oral Daily Ronald Grimes MD   2,000 Units at 04/28/24 0915    ciprofloxacin (Cipro) IVPB 400 mg  400 mg intravenous q24h Ronald Grimes MD   Stopped at 04/27/24 1811    colchicine tablet 1.2 mg  1.2 mg oral Daily Warren Head MD        cyanocobalamin (Vitamin B-12) tablet 1,000 mcg  1,000 mcg oral Daily Ronald Grimes MD   1,000 mcg at 04/28/24 0916    dextrose 50 % injection 12.5 g  12.5 g intravenous q15 min PRN Ronald Grimes MD        dextrose 50 % injection 25 g  25 g intravenous q15 min PRN Ronald Grimes MD        diphenhydrAMINE (BENADryl) liquid 12.5 mg  12.5 mg oral q8h PRN Ashwin Cruz DO   12.5 mg at 04/26/24 2027    glucagon (Glucagen) injection 1 mg  1 mg intramuscular q15 min PRN Ronald Grimes MD        glucagon (Glucagen) injection 1 mg  1 mg intramuscular q15 min PRN Ronald Grimes MD        [Held by provider] heparin (porcine) injection 5,000 Units  5,000 Units subcutaneous q12h Ronald Grimes MD   5,000 Units at 04/28/24 0916    HYDROmorphone (Dilaudid)  tablet 1 mg  1 mg oral q6h PRN Ronald Grimes MD   1 mg at 04/26/24 1823    HYDROmorphone (Dilaudid) tablet 2 mg  2 mg oral q6h PRN Ronald Grimes MD   2 mg at 04/28/24 1527    HYDROmorphone PF (Dilaudid) injection 0.2 mg  0.2 mg intravenous q4h PRN Ronald Grimes MD   0.2 mg at 04/26/24 2035    insulin lispro (HumaLOG) injection 0-5 Units  0-5 Units subcutaneous TID with meals Ronald Grimes MD   2 Units at 04/24/24 1235    ipratropium-albuteroL (Duo-Neb) 0.5-2.5 mg/3 mL nebulizer solution 3 mL  3 mL nebulization q6h PRN Ronald Grimes MD        lidocaine 4 % patch 1 patch  1 patch transdermal Daily Ronald Grimes MD        melatonin tablet 3 mg  3 mg oral Nightly PRN Ronald Grimes MD        metoprolol tartrate (Lopressor) tablet 25 mg  25 mg oral BID Ronald Grimes MD   25 mg at 04/28/24 0916    metroNIDAZOLE (Flagyl) 500 mg in NaCl (iso-os) 100 mL  500 mg intravenous q8h Ronald Grimes MD   Stopped at 04/28/24 1631    neomycin-bacitracin-polymyxin (Neosporin) ointment foil packet   Topical Daily Ronald Grimes MD   10 Application at 04/28/24 0914    ondansetron (Zofran) tablet 4 mg  4 mg oral q8h PRN Ronald Grimes MD        Or    ondansetron (Zofran) injection 4 mg  4 mg intravenous q8h PRN Ronald Grimes MD   4 mg at 04/26/24 1328    polyethylene glycol (Glycolax, Miralax) packet 17 g  17 g oral Daily PRN Ronald Grimes MD        pravastatin (Pravachol) tablet 40 mg  40 mg oral Nightly Ronald Grimes MD   40 mg at 04/27/24 2051    pregabalin (Lyrica) capsule 75 mg  75 mg oral BID Ronald Grimes MD   75 mg at 04/28/24 0915    sevelamer carbonate (Renvela) tablet 800 mg  800 mg oral TID with meals Ronald Grimes MD   800 mg at 04/28/24 0918     No current [facility]-ordered outpatient medications on file.         Vitals:   /58 (BP Location: Left arm, Patient Position: Sitting)   Pulse  "57   Temp 36.9 °C (98.4 °F) (Temporal)   Resp 17   Ht 1.549 m (5' 1\")   Wt 68 kg (150 lb)   SpO2 93%   BMI 28.34 kg/m²   BLOOD PRESSURE RANGE:  Systolic (24hrs), Av , Min:117 , Max:175   ; Diastolic (24hrs), Av, Min:58, Max:73    24HR INTAKE/OUTPUT:    Intake/Output Summary (Last 24 hours) at 2024 1721  Last data filed at 2024 1200  Gross per 24 hour   Intake 837.5 ml   Output 300 ml   Net 537.5 ml       Physical exam:   Alert and oriented x 3 NAD  Neck: no JVD  CV: RRR  Lungs: CTA bilaterally  Abd: soft, NT, ND   Ext: no lower extremity edema        Data:   Labs:  Results for orders placed or performed during the hospital encounter of 24 (from the past 24 hour(s))   POCT GLUCOSE   Result Value Ref Range    POCT Glucose 148 (H) 74 - 99 mg/dL   CBC and Auto Differential   Result Value Ref Range    WBC 4.8 4.4 - 11.3 x10*3/uL    nRBC 0.0 0.0 - 0.0 /100 WBCs    RBC 2.46 (L) 4.00 - 5.20 x10*6/uL    Hemoglobin 7.5 (L) 12.0 - 16.0 g/dL    Hematocrit 22.7 (L) 36.0 - 46.0 %    MCV 92 80 - 100 fL    MCH 30.5 26.0 - 34.0 pg    MCHC 33.0 32.0 - 36.0 g/dL    RDW 23.0 (H) 11.5 - 14.5 %    Platelets 172 150 - 450 x10*3/uL    Neutrophils % 58.4 40.0 - 80.0 %    Immature Granulocytes %, Automated 0.4 0.0 - 0.9 %    Lymphocytes % 30.8 13.0 - 44.0 %    Monocytes % 7.7 2.0 - 10.0 %    Eosinophils % 2.3 0.0 - 6.0 %    Basophils % 0.4 0.0 - 2.0 %    Neutrophils Absolute 2.81 1.60 - 5.50 x10*3/uL    Immature Granulocytes Absolute, Automated 0.02 0.00 - 0.50 x10*3/uL    Lymphocytes Absolute 1.48 0.80 - 3.00 x10*3/uL    Monocytes Absolute 0.37 0.05 - 0.80 x10*3/uL    Eosinophils Absolute 0.11 0.00 - 0.40 x10*3/uL    Basophils Absolute 0.02 0.00 - 0.10 x10*3/uL   Basic Metabolic Panel   Result Value Ref Range    Glucose 107 (H) 74 - 99 mg/dL    Sodium 136 136 - 145 mmol/L    Potassium 4.1 3.5 - 5.3 mmol/L    Chloride 103 98 - 107 mmol/L    Bicarbonate 26 21 - 32 mmol/L    Anion Gap 11 10 - 20 mmol/L    Urea " Nitrogen 24 (H) 6 - 23 mg/dL    Creatinine 2.64 (H) 0.50 - 1.05 mg/dL    eGFR 18 (L) >60 mL/min/1.73m*2    Calcium 8.7 8.6 - 10.3 mg/dL   Magnesium   Result Value Ref Range    Magnesium 1.68 1.60 - 2.40 mg/dL   Morphology   Result Value Ref Range    RBC Morphology See Below     Polychromasia Mild     Hypochromia Mild     Target Cells Few     Ovalocytes Few     Teardrop Cells Few     Basophilic Stippling Present    POCT GLUCOSE   Result Value Ref Range    POCT Glucose 114 (H) 74 - 99 mg/dL   POCT GLUCOSE   Result Value Ref Range    POCT Glucose 132 (H) 74 - 99 mg/dL   POCT GLUCOSE   Result Value Ref Range    POCT Glucose 149 (H) 74 - 99 mg/dL     *Note: Due to a large number of results and/or encounters for the requested time period, some results have not been displayed. A complete set of results can be found in Results Review.            Assessment and Plan:  Patient is 79 y.o. female who is admitted to hospital for fall with acute blood loss anemia after scalp laceration. Nephrology consulted in view of ESRD.      ESRD- HD [facility]         Anemia acute on chronic with hx of MDS-   Has been getting Micera and iron at her HD unit. 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  Patient is status post cholecystostomy tube placement on 4/27/2024.     Recommendations:   -Patient was dialyzed on 4/26/2024.  No need for dialysis today.  Next dialysis is scheduled for tomorrow on 4/29/2024.  -Transfuse for hemoglobin less than 7           Please do not hesitate to contact me at [phone] if there is any question or concern.   Samuel Espinosa MD (Ramy Pearce MD)

## 2024-04-28 NOTE — PROGRESS NOTES
"GENERAL SURGERY PROGRESS NOTE    Tana Hargrove   1945   [id number]     Tana Hargrove is a 79 y.o. female on day 6 of admission presenting with Fall, initial encounter.    Diagnostic Laparoscopy, lysis of adhesions on 4/26/24, 2 Days Post-Op    Subjective  No acute events overnight. PCT placed yesterday with IR, with purulence aspirated. Patient reports some abdominal pain near the tube. Tolerated some water yesterday.     Review of Systems:  Review of Systems   Constitutional:  Negative for chills and fever.   Respiratory:  Negative for chest tightness and shortness of breath.    Cardiovascular:  Negative for chest pain and leg swelling.   Gastrointestinal:  Positive for abdominal pain. Negative for nausea.   Musculoskeletal:  Negative for joint swelling.   Skin:  Negative for rash.   Neurological:  Negative for light-headedness.       Objective    Last Recorded Vitals  Blood pressure 117/66, pulse 59, temperature 36.5 °C (97.7 °F), temperature source Temporal, resp. rate 17, height 1.549 m (5' 1\"), weight 68 kg (150 lb), SpO2 92%.    Intake/Output last 3 Shifts:  I/O last 3 completed shifts:  In: 3137.9 (46.1 mL/kg) [P.O.:1170; I.V.:684.7 (10.1 mL/kg); Blood:983.3; IV Piggyback:300]  Out: 415 (6.1 mL/kg) [Urine:360 (0.1 mL/kg/hr); Drains:55]  Weight: 68 kg     Intake/Output Summary (Last 24 hours) at 4/28/2024 0808  Last data filed at 4/28/2024 0338  Gross per 24 hour   Intake 2601.25 ml   Output 415 ml   Net 2186.25 ml       Physical Exam  Constitutional:       General: She is not in acute distress.  HENT:      Head: Normocephalic.      Comments: Posterior scalp laceration repaired with staples in place  Eyes:      Extraocular Movements: Extraocular movements intact.      Conjunctiva/sclera: Conjunctivae normal.   Cardiovascular:      Rate and Rhythm: Normal rate and regular rhythm.      Pulses: Normal pulses.   Pulmonary:      Effort: Pulmonary effort is normal.   Abdominal:      Palpations: Abdomen is soft. "      Comments: Perc chidi tube in place, bile tinged serous fluid in bulb. Some tenderness near the tube and in the right upper quadrant. Incisions approximated, some mild drainage from umbilical incision   Musculoskeletal:         General: No swelling. Normal range of motion.      Cervical back: Neck supple.   Skin:     General: Skin is warm and dry.   Neurological:      General: No focal deficit present.      Mental Status: She is alert and oriented to person, place, and time.         Relevant Results  Labs:  Results for orders placed or performed during the hospital encounter of 04/22/24 (from the past 24 hour(s))   POCT GLUCOSE   Result Value Ref Range    POCT Glucose 119 (H) 74 - 99 mg/dL   POCT GLUCOSE   Result Value Ref Range    POCT Glucose 101 (H) 74 - 99 mg/dL   POCT GLUCOSE   Result Value Ref Range    POCT Glucose 148 (H) 74 - 99 mg/dL   CBC and Auto Differential   Result Value Ref Range    WBC 4.8 4.4 - 11.3 x10*3/uL    nRBC 0.0 0.0 - 0.0 /100 WBCs    RBC 2.46 (L) 4.00 - 5.20 x10*6/uL    Hemoglobin 7.5 (L) 12.0 - 16.0 g/dL    Hematocrit 22.7 (L) 36.0 - 46.0 %    MCV 92 80 - 100 fL    MCH 30.5 26.0 - 34.0 pg    MCHC 33.0 32.0 - 36.0 g/dL    RDW 23.0 (H) 11.5 - 14.5 %    Platelets 172 150 - 450 x10*3/uL    Neutrophils % 58.4 40.0 - 80.0 %    Immature Granulocytes %, Automated 0.4 0.0 - 0.9 %    Lymphocytes % 30.8 13.0 - 44.0 %    Monocytes % 7.7 2.0 - 10.0 %    Eosinophils % 2.3 0.0 - 6.0 %    Basophils % 0.4 0.0 - 2.0 %    Neutrophils Absolute 2.81 1.60 - 5.50 x10*3/uL    Immature Granulocytes Absolute, Automated 0.02 0.00 - 0.50 x10*3/uL    Lymphocytes Absolute 1.48 0.80 - 3.00 x10*3/uL    Monocytes Absolute 0.37 0.05 - 0.80 x10*3/uL    Eosinophils Absolute 0.11 0.00 - 0.40 x10*3/uL    Basophils Absolute 0.02 0.00 - 0.10 x10*3/uL   Basic Metabolic Panel   Result Value Ref Range    Glucose 107 (H) 74 - 99 mg/dL    Sodium 136 136 - 145 mmol/L    Potassium 4.1 3.5 - 5.3 mmol/L    Chloride 103 98 - 107  mmol/L    Bicarbonate 26 21 - 32 mmol/L    Anion Gap 11 10 - 20 mmol/L    Urea Nitrogen 24 (H) 6 - 23 mg/dL    Creatinine 2.64 (H) 0.50 - 1.05 mg/dL    eGFR 18 (L) >60 mL/min/1.73m*2    Calcium 8.7 8.6 - 10.3 mg/dL   Magnesium   Result Value Ref Range    Magnesium 1.68 1.60 - 2.40 mg/dL   Morphology   Result Value Ref Range    RBC Morphology See Below     Polychromasia Mild     Hypochromia Mild     Target Cells Few     Ovalocytes Few     Teardrop Cells Few     Basophilic Stippling Present    POCT GLUCOSE   Result Value Ref Range    POCT Glucose 114 (H) 74 - 99 mg/dL     *Note: Due to a large number of results and/or encounters for the requested time period, some results have not been displayed. A complete set of results can be found in Results Review.       Images:  IR biliary cholecystostomy   Final Result   Uneventful 8 Japanese cholecystostomy tube placement with no immediate   complication.        TUBE MANAGEMENT: Please leave cholecystostomy tube to external   gravity drainage and flush forward (towards patient and towards   drainage bag) with 5 cc normal saline solution daily to maintain tube   patency.        Cholecystostomy maintenance including tube check and management is   recommended in 8-12 weeks if the gallbladder is not removed   surgically.        I was present for and/or performed the critical portions of the   procedure and immediately available throughout the entire procedure.        I personally reviewed the image(s) / study and resident   interpretation. I agree with the findings as stated.        Performed and dictated at [facility].        MACRO:   None        Signed by: Jin House 4/27/2024 3:22 PM   Dictation workstation:   [id number]      NM hepatobiliary   Final Result   1. Nonvisualization of the gallbladder by 2 hours after radiotracer   injection, compatible with cystic duct obstruction and acute   cholecystitis.        Findings were  communicated with Dr. Adina Kumari by Dr. Day Wheeler via epic secure chat at 12:27 pm on 4/25/2024 with   verification.        I personally reviewed the images/study and I agree with the findings   as stated by Nuclear Medicine fellow Day Wheeler MD. This study   was interpreted at [place], [place].        MACRO:   Critical Finding:  See findings. Notification was initiated on   4/25/2024 at 12:27 pm by  Day Wheeler.  (**-YCF-**) Instructions:        Signed by: Leah Wiley 4/25/2024 1:13 PM   Dictation workstation:   [id number]      XR chest 1 view   Final Result   1.  No radiographic evidence of acute cardiopulmonary process.                  MACRO:   None.        Signed by: Rashida Sanchez 4/23/2024 6:44 AM   Dictation workstation:   [id number]      XR pelvis 1-2 views   Final Result   1.  No radiographic evidence for acute fracture.                  MACRO:   None.        Signed by: Rashida Sanchez 4/23/2024 6:40 AM   Dictation workstation:   [id number]      CT cervical spine wo IV contrast   Final Result        High posterior left parietal scalp laceration with small scalp   hematoma. No depressed skull fracture. No acute intracranial bleed or   focal mass effect.        Moderate volume loss in the brain.        No CT evidence of cervical spine fracture in this exam.        Cervical spine DJD as described.        Ground-glass infiltrative nodular density in the posterior right lung   apex is stable to slightly larger compared to CT scan chest from   04/13/2024. This was not present back on 04/06/2022. Small pneumonia   versus developing infiltrative tumor. Consider further evaluation   with PET-CT scan.        MACRO:   None        Signed by: Saurabh Suarez 4/22/2024 4:58 PM   Dictation workstation:   [id number]      CT head wo IV contrast   Final Result        High posterior left parietal scalp laceration with small scalp   hematoma. No depressed  skull fracture. No acute intracranial bleed or   focal mass effect.        Moderate volume loss in the brain.        No CT evidence of cervical spine fracture in this exam.        Cervical spine DJD as described.        Ground-glass infiltrative nodular density in the posterior right lung   apex is stable to slightly larger compared to CT scan chest from   04/13/2024. This was not present back on 04/06/2022. Small pneumonia   versus developing infiltrative tumor. Consider further evaluation   with PET-CT scan.        MACRO:   None        Signed by: Saurabh Suarez 4/22/2024 4:58 PM   Dictation workstation:   [id number]          Assessment and Plan  Active Problems:    Thickening of wall of gallbladder    Chronic cholecystitis    79 y.o. female with history of CAD, HTN, COPD, ESRD on HD, DM2, MDS, gout, neuropathy who presented following a mechanical fall and sustaining a posterior scalp laceration who developed right upper quadrant abdominal pain, HIDA consistent with acute cholecystitis, s/p lap KAREN 4/26, lap chidi aborted due to extensive adhesions now s/p PCT placement on 4/27    Plan:  -Follow up cultures from PCT drainage  -Advance to low fat diet  -Continue drain flushes, will place a 3-way valve  -Continue IV antibiotics  -Scalp laceration staples to be removed in 7-10 days  -Other cares per primary team    Discussed with attending Dr. Delvin Edwards DO - PGY3  General Surgery

## 2024-04-28 NOTE — PROGRESS NOTES
Tana Hargrove is a 79 y.o. female on day 6 of admission presenting with Fall, initial encounter.      Subjective   79F hx CAD, HTN, COPD, ESRD on HD via permacath, DM2, MDS, gout, neuropathy presents for fall     Patient thinks that she slipped on the floor this afternoon and fell backwards because of that but unsure. thinks knees may have given out as well. felt well after her hospitalization w/o abd pain, n/v, generalized or focal weakness, f/c. has been eating ok. denies dizziness or syncope/loss of consciousness. fell back and hit her buttocks and head. head bled for awhile, now self-resolved w/o sutures. no pain in buttocks/back. no other injuries. not able to get up on own until got help 4h later. no recent cp, sob, diarrhea, dysuria, ha, blurry vision, numbness. taking asa.     4/23: Patient was seen and examined.  She is awake and alert and interactive today.  Multiple changes of bandage dressing due to soaked with blood.  Being transfused with 1 unit of packed red blood cells and 1 unit coming up later today.  Will repeat H&H likely transfuse with 10 units.  Patient completed hemodialysis later today.  PT OT to evaluate for functional status.  Repeat H&H at 6 PM.  Repeat CBC and BMP.  4/24: Patient was seen and examined.  She had an episode of continued bleeding overnight and required more stable on the scalp in order to achieve hemostasis.  I will consult general/trauma surgery to evaluate scalp staples.  Trend CBC and transfuse with hemoglobin less than 7.  Seen by PT OT who recommend home with home health.  Potential discharge within the next 24 to 48 hours  4/25: Patient was seen and examined.  Developed right upper quadrant pain overnight after eating a hamburger for dinner.  Hemoglobin has remained stable at 7.6 this morning.  Seen by general surgery who has ordered a HIDA scan.  General surgery to consider cholecystectomy during this admission.  4/26: Patient was seen and examined.  HIDA scan  yesterday showed signs of acute cholecystitis.  Hemoglobin still borderline at 7.1 but holding.  Was seen by cardiology and I spoke with cardiology who placed her at moderate to high risk of surgery however no further testing will impact this risk.  Possible cholecystectomy later today.  Consider transfusion of 1 more unit of packed red blood cells intraoperatively and within the next 24 hours.  Repeat CBC and BMP in a.m.  4/27: Patient is awaiting cholecystostomy tube today we will have nursing call surgery and also call down to specialist to see what the timing of her cholecystostomy tube is.  Clinically looks to be doing well otherwise but very tender in the right upper quadrant.  4/28: Patient's status post cholecystostomy tube.  Drainage appears relatively clear.  Continue with antibiotics advancing diet.  Continue with hemodialysis.  On discharge plan is for discharge home with cholecystostomy tube and outpatient follow-up with [facility].  Increasing patient's activity.    Objective     Last Recorded Vitals  /58 (BP Location: Left arm, Patient Position: Sitting)   Pulse 57   Temp 36.9 °C (98.4 °F) (Temporal)   Resp 17   Wt 68 kg (150 lb)   SpO2 93%   Intake/Output last 3 Shifts:    Intake/Output Summary (Last 24 hours) at 4/28/2024 1558  Last data filed at 4/28/2024 1200  Gross per 24 hour   Intake 973.69 ml   Output 300 ml   Net 673.69 ml       Admission Weight  Weight: 68 kg (150 lb) (04/22/24 1443)    Daily Weight  04/22/24 : 68 kg (150 lb)    Image Results  IR biliary cholecystostomy  Narrative: Interpreted By:  Jin House,   STUDY:  IR BILIARY CHOLECYSTOSTOMY;  4/27/2024 2:35 pm      INDICATION:  Signs/Symptoms:Percutaneous cholecystostomy tube for acute on chronic  cholecystitis.      COMPARISON:  None.      ACCESSION NUMBER(S):  [id number]      ORDERING CLINICIAN:  ANNIE HENRY      TECHNIQUE:  INTERVENTIONALIST(S):  Jin House MD      CONSENT:  The  patient/patient's POA/next of kin was informed of the nature of  the proposed procedure. The purposes, alternatives, risks, and  benefits were explained and discussed. All questions were answered  and consent was obtained.      RADIATION EXPOSURE:  Fluoroscopy time: 1.2 min.  Dose: 13.96 mGy.  Dose Area Product (DAP): 1.72 Gy cm 2.      SEDATION:  Moderate conscious IV sedation services (supervision of  administration, induction, and maintenance) were provided by the  physician performing the procedure with intravenous fentanyl 25 mcg  and versed 0.5 mg for 22 minutes. The physician was assisted by an  independent trained observer, an interventional radiology nurse, in  the continuous monitoring of patient level of consciousness and  physiologic status.      MEDICATION/CONTRAST:  No additional      TIME OUT:  A time out was performed immediately prior to procedure start with  the interventional team, correctly identifying the patient name, date  of birth, MRN, procedure, anatomy (including marking of site and  side), patient position, procedure consent form, relevant laboratory  and imaging test results, antibiotic administration, safety  precautions, and procedure-specific equipment needs.      COMPLICATIONS:  No immediate adverse events identified.      FINDINGS:  The right upper abdomen was prepped and draped in standard sterile  fashion. Initial ultrasound images demonstrate a mildly dilated  gallbladder containing several calcified stones. After local  anesthesia with lidocaine, a Yueh needle was directed towards the  gallbladder under sonographic guidance using a transhepatic approach.  An ultrasound image was stored for the permanent record. The stylet  was removed, and reflux of bile was seen.      A bile sample was collected and sent to the laboratory for further  testing.      Access was maintained with an 0.035 inch Payne wire, which was  coiled in the gallbladder. Serial dilatation over a 0.035-inch  Payne  wire for an eventual 8 Norwegian cholecystostomy tube was performed with  intermittent image guidance. Pigtail loop of the cholecystostomy tube  was formed and locked in the gallbladder. Position was confirmed with  follow-up cholecystography.      Tube was secured at the skin with a 3-0 Prolene suture and affixed  with an adhesive dressing. Cholecystostomy tube was then opened to  external gravity drainage. The patient tolerated procedure well  without immediate complication and was transported back in stable  condition.      Impression: Uneventful 8 Norwegian cholecystostomy tube placement with no immediate  complication.      TUBE MANAGEMENT: Please leave cholecystostomy tube to external  gravity drainage and flush forward (towards patient and towards  drainage bag) with 5 cc normal saline solution daily to maintain tube  patency.      Cholecystostomy maintenance including tube check and management is  recommended in 8-12 weeks if the gallbladder is not removed  surgically.      I was present for and/or performed the critical portions of the  procedure and immediately available throughout the entire procedure.      I personally reviewed the image(s) / study and resident  interpretation. I agree with the findings as stated.      Performed and dictated at [facility].      MACRO:  None      Signed by: Jin House 4/27/2024 3:22 PM  Dictation workstation:   [id number]      Physical Exam  General: NAD, well-appearing, cooperative. no jaundice, pallor, rash, bruises  HEENT: NC/AT, MMM, no oral lesions or pharyngeal erythema. PERRL. no scleral icterus or conjunctival injection. neck soft w/o masses or LAD. superficial scalp lac well-approximated. no active bleed  CV: RRR, no murmurs, rubs, or gallops. No JVD.  Chest: breathing unlabored. CTAB w/ adequate air-entry. permacath c/d/i  Abd: Pain in the right upper quadrant   Extr: wwp, 2+ and symmetric peripheral pulses. no LE  edema.  Neuro: AAOx3. CNII-XII intact. no focal findings.       Relevant Results             Scheduled medications  acetaminophen, 650 mg, oral, q8h  allopurinol, 100 mg, oral, Daily  amLODIPine, 5 mg, oral, Daily  aspirin, 81 mg, oral, Daily  cholecalciferol, 2,000 Units, oral, Daily  ciprofloxacin, 400 mg, intravenous, q24h  colchicine, 1.2 mg, oral, Daily  cyanocobalamin, 1,000 mcg, oral, Daily  heparin (porcine), 5,000 Units, subcutaneous, q12h  insulin lispro, 0-5 Units, subcutaneous, TID with meals  lidocaine, 1 patch, transdermal, Daily  metoprolol tartrate, 25 mg, oral, BID  metroNIDAZOLE, 500 mg, intravenous, q8h  neomycin-bacitracnZn-polymyxnB, , Topical, Daily  pravastatin, 40 mg, oral, Nightly  pregabalin, 75 mg, oral, BID  sevelamer carbonate, 800 mg, oral, TID with meals      Continuous medications     PRN medications  PRN medications: dextrose, dextrose, diphenhydrAMINE, glucagon, glucagon, HYDROmorphone, HYDROmorphone, HYDROmorphone, ipratropium-albuteroL, melatonin, ondansetron **OR** ondansetron, polyethylene glycol    Assessment/Plan        Ground-level fall mechanical  Left partial scalp lacerations status post staples  Cholecystitis chronic status post cholecystostomy tube on 4/27  Pleurisy  End-stage renal disease on hemodialysis  History of gout  Type 2 diabetes  Neuropathy  Hypertension  Coronary disease  Acute on chronic blood loss anemia  DVT prophylaxis-SCDs subcu heparin    Plan cholecystostomy tube today  Dialysis per per nephrology Monday Wednesday Friday  Post unit of packed red cells  Pulsed dose of colchicine  Advance diet low-fat  Status post 2 staples of the scalp  Contact: son fredy [phone]  Check labs in a.m.   see orders for complete plan                        Spent 35 minutes in the follow-up management of this patient today.    Warren Head MD

## 2024-04-29 ENCOUNTER — APPOINTMENT (OUTPATIENT)
Dept: HEMATOLOGY/ONCOLOGY | Facility: CLINIC | Age: 79
End: 2024-04-29
Payer: MEDICARE

## 2024-04-29 ENCOUNTER — APPOINTMENT (OUTPATIENT)
Dept: DIALYSIS | Facility: HOSPITAL | Age: 79
End: 2024-04-29
Payer: MEDICARE

## 2024-04-29 ENCOUNTER — APPOINTMENT (OUTPATIENT)
Dept: CARDIOLOGY | Facility: CLINIC | Age: 79
End: 2024-04-29
Payer: MEDICARE

## 2024-04-29 LAB
ANION GAP SERPL CALC-SCNC: 10 MMOL/L (ref 10–20)
BASO STIPL BLD QL SMEAR: PRESENT
BASOPHILS # BLD AUTO: 0.02 X10*3/UL (ref 0–0.1)
BASOPHILS NFR BLD AUTO: 0.5 %
BUN SERPL-MCNC: 31 MG/DL (ref 6–23)
CALCIUM SERPL-MCNC: 8.6 MG/DL (ref 8.6–10.3)
CHLORIDE SERPL-SCNC: 102 MMOL/L (ref 98–107)
CO2 SERPL-SCNC: 25 MMOL/L (ref 21–32)
CREAT SERPL-MCNC: 2.73 MG/DL (ref 0.5–1.05)
DACRYOCYTES BLD QL SMEAR: NORMAL
EGFRCR SERPLBLD CKD-EPI 2021: 17 ML/MIN/1.73M*2
EOSINOPHIL # BLD AUTO: 0.14 X10*3/UL (ref 0–0.4)
EOSINOPHIL NFR BLD AUTO: 3.2 %
ERYTHROCYTE [DISTWIDTH] IN BLOOD BY AUTOMATED COUNT: 22.4 % (ref 11.5–14.5)
FOLATE SERPL-MCNC: 11.4 NG/ML
GLUCOSE BLD MANUAL STRIP-MCNC: 121 MG/DL (ref 74–99)
GLUCOSE BLD MANUAL STRIP-MCNC: 123 MG/DL (ref 74–99)
GLUCOSE BLD MANUAL STRIP-MCNC: 129 MG/DL (ref 74–99)
GLUCOSE BLD MANUAL STRIP-MCNC: 144 MG/DL (ref 74–99)
GLUCOSE BLD MANUAL STRIP-MCNC: 145 MG/DL (ref 74–99)
GLUCOSE SERPL-MCNC: 157 MG/DL (ref 74–99)
HCT VFR BLD AUTO: 23.1 % (ref 36–46)
HGB BLD-MCNC: 7.5 G/DL (ref 12–16)
IMM GRANULOCYTES # BLD AUTO: 0.02 X10*3/UL (ref 0–0.5)
IMM GRANULOCYTES NFR BLD AUTO: 0.5 % (ref 0–0.9)
IRON SATN MFR SERPL: 30 % (ref 25–45)
IRON SERPL-MCNC: 39 UG/DL (ref 35–150)
LYMPHOCYTES # BLD AUTO: 1.25 X10*3/UL (ref 0.8–3)
LYMPHOCYTES NFR BLD AUTO: 28.7 %
MAGNESIUM SERPL-MCNC: 1.6 MG/DL (ref 1.6–2.4)
MCH RBC QN AUTO: 30.2 PG (ref 26–34)
MCHC RBC AUTO-ENTMCNC: 32.5 G/DL (ref 32–36)
MCV RBC AUTO: 93 FL (ref 80–100)
MONOCYTES # BLD AUTO: 0.46 X10*3/UL (ref 0.05–0.8)
MONOCYTES NFR BLD AUTO: 10.6 %
NEUTROPHILS # BLD AUTO: 2.46 X10*3/UL (ref 1.6–5.5)
NEUTROPHILS NFR BLD AUTO: 56.5 %
NRBC BLD-RTO: 0 /100 WBCS (ref 0–0)
OVALOCYTES BLD QL SMEAR: NORMAL
PLATELET # BLD AUTO: 155 X10*3/UL (ref 150–450)
POTASSIUM SERPL-SCNC: 4.1 MMOL/L (ref 3.5–5.3)
RBC # BLD AUTO: 2.48 X10*6/UL (ref 4–5.2)
RBC MORPH BLD: NORMAL
SODIUM SERPL-SCNC: 133 MMOL/L (ref 136–145)
TIBC SERPL-MCNC: 132 UG/DL (ref 240–445)
UIBC SERPL-MCNC: 93 UG/DL (ref 110–370)
VIT B12 SERPL-MCNC: 1234 PG/ML (ref 211–911)
WBC # BLD AUTO: 4.4 X10*3/UL (ref 4.4–11.3)

## 2024-04-29 PROCEDURE — 82746 ASSAY OF FOLIC ACID SERUM: CPT | Performed by: INTERNAL MEDICINE

## 2024-04-29 PROCEDURE — 2500000005 HC RX 250 GENERAL PHARMACY W/O HCPCS: Performed by: SURGERY

## 2024-04-29 PROCEDURE — 8010000001 HC DIALYSIS - HEMODIALYSIS PER DAY

## 2024-04-29 PROCEDURE — 2500000004 HC RX 250 GENERAL PHARMACY W/ HCPCS (ALT 636 FOR OP/ED): Mod: JZ | Performed by: SURGERY

## 2024-04-29 PROCEDURE — 97110 THERAPEUTIC EXERCISES: CPT | Mod: GP,CQ | Performed by: PHYSICAL THERAPY ASSISTANT

## 2024-04-29 PROCEDURE — 83735 ASSAY OF MAGNESIUM: CPT | Performed by: INTERNAL MEDICINE

## 2024-04-29 PROCEDURE — 82607 VITAMIN B-12: CPT | Performed by: INTERNAL MEDICINE

## 2024-04-29 PROCEDURE — 97530 THERAPEUTIC ACTIVITIES: CPT | Mod: GP,CQ | Performed by: PHYSICAL THERAPY ASSISTANT

## 2024-04-29 PROCEDURE — 2500000001 HC RX 250 WO HCPCS SELF ADMINISTERED DRUGS (ALT 637 FOR MEDICARE OP): Performed by: SURGERY

## 2024-04-29 PROCEDURE — 36415 COLL VENOUS BLD VENIPUNCTURE: CPT | Performed by: INTERNAL MEDICINE

## 2024-04-29 PROCEDURE — 99233 SBSQ HOSP IP/OBS HIGH 50: CPT | Performed by: INTERNAL MEDICINE

## 2024-04-29 PROCEDURE — 85025 COMPLETE CBC W/AUTO DIFF WBC: CPT | Performed by: INTERNAL MEDICINE

## 2024-04-29 PROCEDURE — 83540 ASSAY OF IRON: CPT | Performed by: INTERNAL MEDICINE

## 2024-04-29 PROCEDURE — 1200000002 HC GENERAL ROOM WITH TELEMETRY DAILY

## 2024-04-29 PROCEDURE — 82947 ASSAY GLUCOSE BLOOD QUANT: CPT

## 2024-04-29 PROCEDURE — 97116 GAIT TRAINING THERAPY: CPT | Mod: GP,CQ | Performed by: PHYSICAL THERAPY ASSISTANT

## 2024-04-29 PROCEDURE — 2500000006 HC RX 250 W HCPCS SELF ADMINISTERED DRUGS (ALT 637 FOR ALL PAYERS): Performed by: SURGERY

## 2024-04-29 PROCEDURE — 2500000001 HC RX 250 WO HCPCS SELF ADMINISTERED DRUGS (ALT 637 FOR MEDICARE OP): Performed by: INTERNAL MEDICINE

## 2024-04-29 PROCEDURE — 80048 BASIC METABOLIC PNL TOTAL CA: CPT | Performed by: INTERNAL MEDICINE

## 2024-04-29 PROCEDURE — 99231 SBSQ HOSP IP/OBS SF/LOW 25: CPT | Performed by: SURGERY

## 2024-04-29 RX ORDER — CEFDINIR 300 MG/1
300 CAPSULE ORAL DAILY
Status: DISCONTINUED | OUTPATIENT
Start: 2024-04-29 | End: 2024-04-30 | Stop reason: HOSPADM

## 2024-04-29 RX ORDER — BISACODYL 5 MG
15 TABLET, DELAYED RELEASE (ENTERIC COATED) ORAL ONCE
Status: COMPLETED | OUTPATIENT
Start: 2024-04-29 | End: 2024-04-29

## 2024-04-29 RX ORDER — CEFDINIR 300 MG/1
300 CAPSULE ORAL 2 TIMES DAILY
Status: DISCONTINUED | OUTPATIENT
Start: 2024-04-29 | End: 2024-04-29 | Stop reason: DRUGHIGH

## 2024-04-29 RX ADMIN — HYDROMORPHONE HYDROCHLORIDE 2 MG: 2 TABLET ORAL at 02:08

## 2024-04-29 RX ADMIN — PRAVASTATIN SODIUM 40 MG: 40 TABLET ORAL at 20:15

## 2024-04-29 RX ADMIN — BISACODYL 15 MG: 5 TABLET, COATED ORAL at 18:42

## 2024-04-29 RX ADMIN — CYANOCOBALAMIN TAB 1000 MCG 1000 MCG: 1000 TAB at 08:37

## 2024-04-29 RX ADMIN — PREGABALIN 75 MG: 75 CAPSULE ORAL at 20:16

## 2024-04-29 RX ADMIN — METOPROLOL TARTRATE 25 MG: 25 TABLET, FILM COATED ORAL at 20:16

## 2024-04-29 RX ADMIN — SEVELAMER CARBONATE 800 MG: 800 TABLET, FILM COATED ORAL at 08:38

## 2024-04-29 RX ADMIN — ACETAMINOPHEN 650 MG: 325 TABLET ORAL at 08:38

## 2024-04-29 RX ADMIN — ASPIRIN 81 MG CHEWABLE TABLET 81 MG: 81 TABLET CHEWABLE at 08:38

## 2024-04-29 RX ADMIN — CIPROFLOXACIN 400 MG: 400 INJECTION, SOLUTION INTRAVENOUS at 17:18

## 2024-04-29 RX ADMIN — METRONIDAZOLE 500 MG: 500 INJECTION, SOLUTION INTRAVENOUS at 08:36

## 2024-04-29 RX ADMIN — CEFDINIR 300 MG: 300 CAPSULE ORAL at 20:16

## 2024-04-29 RX ADMIN — METOPROLOL TARTRATE 25 MG: 25 TABLET, FILM COATED ORAL at 08:37

## 2024-04-29 RX ADMIN — COLCHICINE 1.2 MG: 0.6 TABLET ORAL at 08:37

## 2024-04-29 RX ADMIN — PREGABALIN 75 MG: 75 CAPSULE ORAL at 08:37

## 2024-04-29 RX ADMIN — Medication 2000 UNITS: at 08:37

## 2024-04-29 RX ADMIN — SEVELAMER CARBONATE 800 MG: 800 TABLET, FILM COATED ORAL at 17:17

## 2024-04-29 RX ADMIN — BACITRACIN ZINC, NEOMYCIN, POLYMYXIN B 10 APPLICATION: 400; 3.5; 5 OINTMENT TOPICAL at 08:38

## 2024-04-29 RX ADMIN — ACETAMINOPHEN 650 MG: 325 TABLET ORAL at 17:17

## 2024-04-29 RX ADMIN — ACETAMINOPHEN 650 MG: 325 TABLET ORAL at 02:07

## 2024-04-29 RX ADMIN — AMLODIPINE BESYLATE 5 MG: 5 TABLET ORAL at 08:37

## 2024-04-29 RX ADMIN — SEVELAMER CARBONATE 800 MG: 800 TABLET, FILM COATED ORAL at 11:41

## 2024-04-29 RX ADMIN — METRONIDAZOLE 500 MG: 500 INJECTION, SOLUTION INTRAVENOUS at 17:18

## 2024-04-29 RX ADMIN — METRONIDAZOLE 500 MG: 500 INJECTION, SOLUTION INTRAVENOUS at 02:08

## 2024-04-29 RX ADMIN — ALLOPURINOL 100 MG: 100 TABLET ORAL at 08:37

## 2024-04-29 ASSESSMENT — COGNITIVE AND FUNCTIONAL STATUS - GENERAL
HELP NEEDED FOR BATHING: A LITTLE
DRESSING REGULAR UPPER BODY CLOTHING: A LITTLE
EATING MEALS: A LITTLE
STANDING UP FROM CHAIR USING ARMS: A LITTLE
PERSONAL GROOMING: A LITTLE
WALKING IN HOSPITAL ROOM: A LITTLE
CLIMB 3 TO 5 STEPS WITH RAILING: TOTAL
DAILY ACTIVITIY SCORE: 19
MOBILITY SCORE: 20
TOILETING: A LITTLE
TURNING FROM BACK TO SIDE WHILE IN FLAT BAD: A LITTLE
CLIMB 3 TO 5 STEPS WITH RAILING: A LOT
STANDING UP FROM CHAIR USING ARMS: A LITTLE
WALKING IN HOSPITAL ROOM: A LITTLE
MOBILITY SCORE: 17
MOVING TO AND FROM BED TO CHAIR: A LITTLE

## 2024-04-29 ASSESSMENT — ENCOUNTER SYMPTOMS
FEVER: 0
ABDOMINAL PAIN: 1
CHILLS: 0
LIGHT-HEADEDNESS: 0
NAUSEA: 0
SHORTNESS OF BREATH: 0
CHEST TIGHTNESS: 0
JOINT SWELLING: 0

## 2024-04-29 ASSESSMENT — PAIN SCALES - GENERAL
PAINLEVEL_OUTOF10: 7
PAINLEVEL_OUTOF10: 2
PAINLEVEL_OUTOF10: 0 - NO PAIN

## 2024-04-29 ASSESSMENT — PAIN - FUNCTIONAL ASSESSMENT
PAIN_FUNCTIONAL_ASSESSMENT: 0-10

## 2024-04-29 ASSESSMENT — PAIN DESCRIPTION - DESCRIPTORS: DESCRIPTORS: ACHING

## 2024-04-29 NOTE — PROGRESS NOTES
Report from Sending RN:    Report From: Yulisa Berman RN  Recent Surgery of Procedure: Yes, Coly drain placed on 04/23  Baseline Level of Consciousness (LOC): A&Ox4 Forgetful  Oxygen Use: No  Type: RA  Diabetic: Yes, 145@1100  Last BP Med Given Day of Dialysis: See MAR  Last Pain Med Given: See MAR  Lab Tests to be Obtained with Dialysis: Yes  Blood Transfusion to be Given During Dialysis: No  Available IV Access: Yes  Medications to be Administered During Dialysis: No  Continuous IV Infusion Running: No  Restraints on Currently or in the Last 24 Hours: No  Hand-Off Communication: Full Code.

## 2024-04-29 NOTE — PROGRESS NOTES
Physical Therapy    Physical Therapy Treatment    Patient Name: Tana Hargrove  MRN: [id number]  Today's Date: 4/29/2024  Time Calculation  Start Time: 0815  Stop Time: 0854  Time Calculation (min): 39 min       Assessment/Plan   PT Assessment  End of Session Communication: Bedside nurse  Assessment Comment: pt demonstrated good motivation and compliance with PT. She will benefit from further skilled PT services to return home with good safety and decreased risk of falls.  End of Session Patient Position: Alarm on, Bed, 3 rail up     PT Plan  Treatment/Interventions: Bed mobility, Transfer training, Gait training, Balance training, Strengthening, Endurance training, Therapeutic exercise, Therapeutic activity, Home exercise program  PT Plan: Skilled PT  PT Frequency: 3 times per week  PT Discharge Recommendations: Low intensity level of continued care (May benefit from additional family support at home prn at least initially.)  PT - OK to Discharge: Yes (when medically cleared)      General Visit Information:   PT  Visit  PT Received On: 04/29/24  Response to Previous Treatment: Patient with no complaints from previous session.  General  Reason for Referral: Dx:Fall, Syncope?, L scalp hematoma and laceration  Referred By: Steven  Past Medical History Relevant to Rehab: CAD, HTN, COPD, ESRD on HD,, DM,, MDS, neuropathy, multiple recent admits  Prior to Session Communication: Bedside nurse  Patient Position Received: Bed, 3 rail up, Alarm on  Preferred Learning Style: verbal  General Comment: pt agreeable to PT. pt reporting she feels better since having gal bladder removed. pt hoping to dc home soon.    Precautions:  Precautions  Medical Precautions: Fall precautions  Post-Surgical Precautions:  (drain)  Precautions Comment: falls         Pain:  Pain Assessment  Pain Assessment: 0-10  Pain Score: 0 - No pain       Treatments:  Therapeutic Exercise  Therapeutic Exercise Performed: Yes (cues for improved safety and  technique)  Therapeutic Exercise Activity 1: ankle pumps x20  Therapeutic Exercise Activity 2: glut sets x20  Therapeutic Exercise Activity 3: Marches x20  Therapeutic Exercise Activity 4: LAQ x20  Therapeutic Exercise Activity 5: Hip ab/adduction x20    Bed Mobility  Bed Mobility: Yes  Bed Mobility 1  Bed Mobility 1: Supine to sitting  Level of Assistance 1: Minimum assistance  Bed Mobility Comments 1: cues for proper hand placement to transition trunk into sitting.    Ambulation/Gait Training  Ambulation/Gait Training Performed: Yes  Ambulation/Gait Training 1  Surface 1: Level tile  Device 1: Rolling walker  Assistance 1: Contact guard  Quality of Gait 1: Diminished heel strike, Decreased step length  Comments/Distance (ft) 1: 100 'x2 and 30'x 1 with cues for walker safety and technique.  Transfers  Transfer: Yes  Transfer 1  Technique 1: Sit to stand, Stand to sit  Transfer Device 1: Walker  Transfer Level of Assistance 1: Contact guard  Trials/Comments 1: cues for hand placement and safety.  Transfers 2  Technique 2: Stand pivot  Transfer Device 2: Walker  Transfer Level of Assistance 2: Contact guard  Trials/Comments 2: cues for walker safety and technique.    Outcome Measures:  [facility] Basic Mobility  Turning from your back to your side while in a flat bed without using bedrails: None  Moving from lying on your back to sitting on the side of a flat bed without using bedrails: A little  Moving to and from bed to chair (including a wheelchair): A little  Standing up from a chair using your arms (e.g. wheelchair or bedside chair): A little  To walk in hospital room: A little  Climbing 3-5 steps with railing: Total  Basic Mobility - Total Score: 17    Education Documentation  Precautions, taught by Radha Etienne PTA at 4/29/2024  9:10 AM.  Learner: Patient  Readiness: Acceptance  Method: Explanation  Response: Verbalizes Understanding, Needs Reinforcement    Body Mechanics, taught by Radha Etienne PTA at 4/29/2024   9:10 AM.  Learner: Patient  Readiness: Acceptance  Method: Explanation  Response: Verbalizes Understanding, Needs Reinforcement    Home Exercise Program, taught by Radha Etienne PTA at 4/29/2024  9:10 AM.  Learner: Patient  Readiness: Acceptance  Method: Explanation  Response: Verbalizes Understanding, Needs Reinforcement    Mobility Training, taught by Radha Etienne PTA at 4/29/2024  9:10 AM.  Learner: Patient  Readiness: Acceptance  Method: Explanation  Response: Verbalizes Understanding, Needs Reinforcement    Education Comments  No comments found.          Encounter Problems       Encounter Problems (Active)       PT Problem       transfers (Progressing)       Start:  04/24/24    Expected End:  04/29/24       Patient will perform all transfers with SBA x1          gait (Progressing)       Start:  04/24/24    Expected End:  04/29/24       Patient will amb 100+ feet with SBA x1          strengthening  (Progressing)       Start:  04/24/24    Expected End:  04/29/24       Patient will perform 20+ reps of AROM/RROM for DIGNA LE's to improve safety and functional independence              Pain - Adult

## 2024-04-29 NOTE — PROGRESS NOTES
Report to Receiving RN:    Report To: Yulisa Berman RN  Time Report Called: [id number]  Hand-Off Communication: 1L of fluid removed; Post HD BP is 157/66 p64; No issues with dialysis.  Complications During Treatment: No  Ultrafiltration Treatment: No  Medications Administered During Dialysis: No  Blood Products Administered During Dialysis: No  Labs Sent During Dialysis: No  Heparin Drip Rate Changes: N/A  Dialysis Catheter Dressing: D&I  Last Dressing Change: 04/24/2024    Electronic Signatures:  Letha Pressley OCDT  Last Updated: 5:19 PM by LETHA PRESSLEY

## 2024-04-29 NOTE — PROGRESS NOTES
Tana Hargrove is a 79 y.o. female on day 7 of admission presenting with Fall, initial encounter.      Subjective   79F hx CAD, HTN, COPD, ESRD on HD via permacath, DM2, MDS, gout, neuropathy presents for fall     Patient thinks that she slipped on the floor this afternoon and fell backwards because of that but unsure. thinks knees may have given out as well. felt well after her hospitalization w/o abd pain, n/v, generalized or focal weakness, f/c. has been eating ok. denies dizziness or syncope/loss of consciousness. fell back and hit her buttocks and head. head bled for awhile, now self-resolved w/o sutures. no pain in buttocks/back. no other injuries. not able to get up on own until got help 4h later. no recent cp, sob, diarrhea, dysuria, ha, blurry vision, numbness. taking asa.     4/23: Patient was seen and examined.  She is awake and alert and interactive today.  Multiple changes of bandage dressing due to soaked with blood.  Being transfused with 1 unit of packed red blood cells and 1 unit coming up later today.  Will repeat H&H likely transfuse with 10 units.  Patient completed hemodialysis later today.  PT OT to evaluate for functional status.  Repeat H&H at 6 PM.  Repeat CBC and BMP.  4/24: Patient was seen and examined.  She had an episode of continued bleeding overnight and required more stable on the scalp in order to achieve hemostasis.  I will consult general/trauma surgery to evaluate scalp staples.  Trend CBC and transfuse with hemoglobin less than 7.  Seen by PT OT who recommend home with home health.  Potential discharge within the next 24 to 48 hours  4/25: Patient was seen and examined.  Developed right upper quadrant pain overnight after eating a hamburger for dinner.  Hemoglobin has remained stable at 7.6 this morning.  Seen by general surgery who has ordered a HIDA scan.  General surgery to consider cholecystectomy during this admission.  4/26: Patient was seen and examined.  HIDA scan  yesterday showed signs of acute cholecystitis.  Hemoglobin still borderline at 7.1 but holding.  Was seen by cardiology and I spoke with cardiology who placed her at moderate to high risk of surgery however no further testing will impact this risk.  Possible cholecystectomy later today.  Consider transfusion of 1 more unit of packed red blood cells intraoperatively and within the next 24 hours.  Repeat CBC and BMP in a.m.  4/27: Patient is awaiting cholecystostomy tube today we will have nursing call surgery and also call down to specialist to see what the timing of her cholecystostomy tube is.  Clinically looks to be doing well otherwise but very tender in the right upper quadrant.  4/28: Patient's status post cholecystostomy tube.  Drainage appears relatively clear.  Continue with antibiotics advancing diet.  Continue with hemodialysis.  On discharge plan is for discharge home with cholecystostomy tube and outpatient follow-up with [facility].  Increasing patient's activity.  4/29: Pleuritic pain is improving dose of colchicine again tomorrow will go home on Omnicef.  For 7 days.  Objective     Last Recorded Vitals  /62 (BP Location: Left arm, Patient Position: Lying)   Pulse 67   Temp 36.8 °C (98.2 °F) (Temporal)   Resp 18   Wt 68 kg (150 lb)   SpO2 95%   Intake/Output last 3 Shifts:    Intake/Output Summary (Last 24 hours) at 4/29/2024 1834  Last data filed at 4/29/2024 1718  Gross per 24 hour   Intake 1080 ml   Output 4240 ml   Net -3160 ml       Admission Weight  Weight: 68 kg (150 lb) (04/22/24 1443)    Daily Weight  04/22/24 : 68 kg (150 lb)    Image Results  IR biliary cholecystostomy  Narrative: Interpreted By:  Jin House,   STUDY:  IR BILIARY CHOLECYSTOSTOMY;  4/27/2024 2:35 pm      INDICATION:  Signs/Symptoms:Percutaneous cholecystostomy tube for acute on chronic  cholecystitis.      COMPARISON:  None.      ACCESSION NUMBER(S):  [id number]      ORDERING CLINICIAN:  ANNIE  ELAINE      TECHNIQUE:  INTERVENTIONALIST(S):  Jin House MD      CONSENT:  The patient/patient's POA/next of kin was informed of the nature of  the proposed procedure. The purposes, alternatives, risks, and  benefits were explained and discussed. All questions were answered  and consent was obtained.      RADIATION EXPOSURE:  Fluoroscopy time: 1.2 min.  Dose: 13.96 mGy.  Dose Area Product (DAP): 1.72 Gy cm 2.      SEDATION:  Moderate conscious IV sedation services (supervision of  administration, induction, and maintenance) were provided by the  physician performing the procedure with intravenous fentanyl 25 mcg  and versed 0.5 mg for 22 minutes. The physician was assisted by an  independent trained observer, an interventional radiology nurse, in  the continuous monitoring of patient level of consciousness and  physiologic status.      MEDICATION/CONTRAST:  No additional      TIME OUT:  A time out was performed immediately prior to procedure start with  the interventional team, correctly identifying the patient name, date  of birth, MRN, procedure, anatomy (including marking of site and  side), patient position, procedure consent form, relevant laboratory  and imaging test results, antibiotic administration, safety  precautions, and procedure-specific equipment needs.      COMPLICATIONS:  No immediate adverse events identified.      FINDINGS:  The right upper abdomen was prepped and draped in standard sterile  fashion. Initial ultrasound images demonstrate a mildly dilated  gallbladder containing several calcified stones. After local  anesthesia with lidocaine, a Yueh needle was directed towards the  gallbladder under sonographic guidance using a transhepatic approach.  An ultrasound image was stored for the permanent record. The stylet  was removed, and reflux of bile was seen.      A bile sample was collected and sent to the laboratory for further  testing.      Access was maintained with an 0.035 inch  Payne wire, which was  coiled in the gallbladder. Serial dilatation over a 0.035-inch Payne  wire for an eventual 8 [place] cholecystostomy tube was performed with  intermittent image guidance. Pigtail loop of the cholecystostomy tube  was formed and locked in the gallbladder. Position was confirmed with  follow-up cholecystography.      Tube was secured at the skin with a 3-0 Prolene suture and affixed  with an adhesive dressing. Cholecystostomy tube was then opened to  external gravity drainage. The patient tolerated procedure well  without immediate complication and was transported back in stable  condition.      Impression: Uneventful 8 [place] Island[place] cholecystostomy tube placement with no immediate  complication.      TUBE MANAGEMENT: Please leave cholecystostomy tube to external  gravity drainage and flush forward (towards patient and towards  drainage bag) with 5 cc normal saline solution daily to maintain tube  patency.      Cholecystostomy maintenance including tube check and management is  recommended in 8-12 weeks if the gallbladder is not removed  surgically.      I was present for and/or performed the critical portions of the  procedure and immediately available throughout the entire procedure.      I personally reviewed the image(s) / study and resident  interpretation. I agree with the findings as stated.      Performed and dictated at [facility].      MACRO:  None      Signed by: Jin House 4/27/2024 3:22 PM  Dictation workstation:   [id number]      Physical Exam  General: NAD, well-appearing, cooperative. no jaundice, pallor, rash, bruises  HEENT: NC/AT, MMM, no oral lesions or pharyngeal erythema. PERRL. no scleral icterus or conjunctival injection. neck soft w/o masses or LAD. superficial scalp lac well-approximated. no active bleed  CV: RRR, no murmurs, rubs, or gallops. No JVD.  Chest: breathing unlabored. CTAB w/ adequate air-entry. permacath c/d/i  Abd: Pain  in the right upper quadrant   Extr: wwp, 2+ and symmetric peripheral pulses. no LE edema.  Neuro: AAOx3. CNII-XII intact. no focal findings.       Relevant Results             Scheduled medications  acetaminophen, 650 mg, oral, q8h  allopurinol, 100 mg, oral, Daily  amLODIPine, 5 mg, oral, Daily  aspirin, 81 mg, oral, Daily  bisacodyl, 15 mg, oral, Once  cholecalciferol, 2,000 Units, oral, Daily  ciprofloxacin, 400 mg, intravenous, q24h  colchicine, 1.2 mg, oral, Daily  cyanocobalamin, 1,000 mcg, oral, Daily  [Held by provider] heparin (porcine), 5,000 Units, subcutaneous, q12h  insulin lispro, 0-5 Units, subcutaneous, TID with meals  lidocaine, 1 patch, transdermal, Daily  metoprolol tartrate, 25 mg, oral, BID  metroNIDAZOLE, 500 mg, intravenous, q8h  neomycin-bacitracnZn-polymyxnB, , Topical, Daily  pravastatin, 40 mg, oral, Nightly  pregabalin, 75 mg, oral, BID  sevelamer carbonate, 800 mg, oral, TID with meals      Continuous medications     PRN medications  PRN medications: dextrose, dextrose, diphenhydrAMINE, glucagon, glucagon, HYDROmorphone, HYDROmorphone, HYDROmorphone, ipratropium-albuteroL, melatonin, ondansetron **OR** ondansetron, polyethylene glycol    Assessment/Plan        Ground-level fall mechanical  Left partial scalp lacerations status post staples  Cholecystitis chronic status post cholecystostomy tube on 4/27  Pleurisy  End-stage renal disease on hemodialysis  History of gout  Type 2 diabetes  Neuropathy  Hypertension  Coronary disease  Acute on chronic blood loss anemia  DVT prophylaxis-SCDs subcu heparin    Plan cholecystostomy tube today  Dialysis per per nephrology Monday Wednesday Friday  Change antibiotics to Omnicef  Post unit of packed red cells  Pulsed dose of colchicine  Advance diet low-fat  Status post 2 staples of the scalp  Contact: tremaine daniel [phone]  Plan Home in the a.m. with home health  see orders for complete plan                        Spent 35 minutes in the follow-up  management of this patient today.    Warren Head MD

## 2024-04-29 NOTE — PROGRESS NOTES
Nephrology Progress Note    Following for ESRD.    Has some abdominal pain with deep inspiration  No shortness of breath      Current Inpatient Medications:  Current Facility-Administered Medications Ordered in Epic   Medication Dose Route Frequency Provider Last Rate Last Admin    acetaminophen (Tylenol) tablet 650 mg  650 mg oral q8h Ronald Grimes MD   650 mg at 04/29/24 0838    allopurinol (Zyloprim) tablet 100 mg  100 mg oral Daily Ronald Grimes MD   100 mg at 04/29/24 0837    amLODIPine (Norvasc) tablet 5 mg  5 mg oral Daily Ronald Grimes MD   5 mg at 04/29/24 0837    aspirin chewable tablet 81 mg  81 mg oral Daily Ronald Grimes MD   81 mg at 04/29/24 0838    cholecalciferol (Vitamin D-3) tablet 2,000 Units  2,000 Units oral Daily Ronald Grimes MD   2,000 Units at 04/29/24 0837    ciprofloxacin (Cipro) IVPB 400 mg  400 mg intravenous q24h Ronald Grimes MD   Stopped at 04/28/24 1820    colchicine tablet 1.2 mg  1.2 mg oral Daily Warren Head MD   1.2 mg at 04/29/24 0837    cyanocobalamin (Vitamin B-12) tablet 1,000 mcg  1,000 mcg oral Daily Ronald Grimes MD   1,000 mcg at 04/29/24 0837    dextrose 50 % injection 12.5 g  12.5 g intravenous q15 min PRN Ronald Grimes MD        dextrose 50 % injection 25 g  25 g intravenous q15 min PRN Ronald Grimes MD        diphenhydrAMINE (BENADryl) liquid 12.5 mg  12.5 mg oral q8h PRN Ashwin Cruz, DO   12.5 mg at 04/26/24 2027    glucagon (Glucagen) injection 1 mg  1 mg intramuscular q15 min PRN Ronald Grimes MD        glucagon (Glucagen) injection 1 mg  1 mg intramuscular q15 min PRN Ronald Grimes MD        [Held by provider] heparin (porcine) injection 5,000 Units  5,000 Units subcutaneous q12h Ronald Grimes MD   5,000 Units at 04/28/24 0957    HYDROmorphone (Dilaudid) tablet 1 mg  1 mg oral q6h PRN Ronald Grimes MD   1 mg at 04/26/24 1824     HYDROmorphone (Dilaudid) tablet 2 mg  2 mg oral q6h PRN Ronald Grimes MD   2 mg at 04/29/24 0208    HYDROmorphone PF (Dilaudid) injection 0.2 mg  0.2 mg intravenous q4h PRN Ronald Grimes MD   0.2 mg at 04/26/24 2035    insulin lispro (HumaLOG) injection 0-5 Units  0-5 Units subcutaneous TID with meals Ronald Grimes MD   2 Units at 04/24/24 1235    ipratropium-albuteroL (Duo-Neb) 0.5-2.5 mg/3 mL nebulizer solution 3 mL  3 mL nebulization q6h PRN Ronald Grimes MD        lidocaine 4 % patch 1 patch  1 patch transdermal Daily Ronald Grimes MD        melatonin tablet 3 mg  3 mg oral Nightly PRN Ronald Grimes MD        metoprolol tartrate (Lopressor) tablet 25 mg  25 mg oral BID Ronald Grimes MD   25 mg at 04/29/24 0837    metroNIDAZOLE (Flagyl) 500 mg in NaCl (iso-os) 100 mL  500 mg intravenous q8h Ronald Grimes MD   Stopped at 04/29/24 0936    neomycin-bacitracin-polymyxin (Neosporin) ointment foil packet   Topical Daily Ronald Grimes MD   10 Application at 04/29/24 0838    ondansetron (Zofran) tablet 4 mg  4 mg oral q8h PRN Ronald Grimes MD        Or    ondansetron (Zofran) injection 4 mg  4 mg intravenous q8h PRN Ronald Grimes MD   4 mg at 04/26/24 1328    polyethylene glycol (Glycolax, Miralax) packet 17 g  17 g oral Daily PRN Ronald Grimes MD        pravastatin (Pravachol) tablet 40 mg  40 mg oral Nightly Ronald Grimes MD   40 mg at 04/28/24 2107    pregabalin (Lyrica) capsule 75 mg  75 mg oral BID Ronald Grimes MD   75 mg at 04/29/24 0837    sevelamer carbonate (Renvela) tablet 800 mg  800 mg oral TID with meals Ronald Grimes MD   800 mg at 04/29/24 0838     No current [facility]-ordered outpatient medications on file.         Vitals:   BP (!) 183/69 (BP Location: Right arm, Patient Position: Sitting) Comment: GE Márquez notified about BP of 183/69  Pulse 61   Temp 36.4 °C (97.5 °F)  "(Temporal)   Resp 18   Ht 1.549 m (5' 1\")   Wt 68 kg (150 lb)   SpO2 94%   BMI 28.34 kg/m²   BLOOD PRESSURE RANGE:  Systolic (24hrs), Av , Min:124 , Max:183   ; Diastolic (24hrs), Av, Min:58, Max:88    24HR INTAKE/OUTPUT:    Intake/Output Summary (Last 24 hours) at 2024 1133  Last data filed at 2024 0914  Gross per 24 hour   Intake 240 ml   Output 1325 ml   Net -1085 ml       Physical exam:   Alert and oriented x 3 NAD  Neck: no JVD  CV: RRR  Lungs: CTA bilaterally  Abd: soft, NT, ND   Ext: no lower extremity edema        Data:   Labs:  Results for orders placed or performed during the hospital encounter of 24 (from the past 24 hour(s))   POCT GLUCOSE   Result Value Ref Range    POCT Glucose 149 (H) 74 - 99 mg/dL   POCT GLUCOSE   Result Value Ref Range    POCT Glucose 186 (H) 74 - 99 mg/dL   CBC and Auto Differential   Result Value Ref Range    WBC 4.4 4.4 - 11.3 x10*3/uL    nRBC 0.0 0.0 - 0.0 /100 WBCs    RBC 2.48 (L) 4.00 - 5.20 x10*6/uL    Hemoglobin 7.5 (L) 12.0 - 16.0 g/dL    Hematocrit 23.1 (L) 36.0 - 46.0 %    MCV 93 80 - 100 fL    MCH 30.2 26.0 - 34.0 pg    MCHC 32.5 32.0 - 36.0 g/dL    RDW 22.4 (H) 11.5 - 14.5 %    Platelets 155 150 - 450 x10*3/uL    Neutrophils % 56.5 40.0 - 80.0 %    Immature Granulocytes %, Automated 0.5 0.0 - 0.9 %    Lymphocytes % 28.7 13.0 - 44.0 %    Monocytes % 10.6 2.0 - 10.0 %    Eosinophils % 3.2 0.0 - 6.0 %    Basophils % 0.5 0.0 - 2.0 %    Neutrophils Absolute 2.46 1.60 - 5.50 x10*3/uL    Immature Granulocytes Absolute, Automated 0.02 0.00 - 0.50 x10*3/uL    Lymphocytes Absolute 1.25 0.80 - 3.00 x10*3/uL    Monocytes Absolute 0.46 0.05 - 0.80 x10*3/uL    Eosinophils Absolute 0.14 0.00 - 0.40 x10*3/uL    Basophils Absolute 0.02 0.00 - 0.10 x10*3/uL   Basic Metabolic Panel   Result Value Ref Range    Glucose 157 (H) 74 - 99 mg/dL    Sodium 133 (L) 136 - 145 mmol/L    Potassium 4.1 3.5 - 5.3 mmol/L    Chloride 102 98 - 107 mmol/L    Bicarbonate 25 21 " - 32 mmol/L    Anion Gap 10 10 - 20 mmol/L    Urea Nitrogen 31 (H) 6 - 23 mg/dL    Creatinine 2.73 (H) 0.50 - 1.05 mg/dL    eGFR 17 (L) >60 mL/min/1.73m*2    Calcium 8.6 8.6 - 10.3 mg/dL   Magnesium   Result Value Ref Range    Magnesium 1.60 1.60 - 2.40 mg/dL   Morphology   Result Value Ref Range    RBC Morphology See Below     Ovalocytes Few     Teardrop Cells Few     Basophilic Stippling Present    Iron and TIBC   Result Value Ref Range    Iron 39 35 - 150 ug/dL    UIBC 93 (L) 110 - 370 ug/dL    TIBC 132 (L) 240 - 445 ug/dL    % Saturation 30 25 - 45 %   Vitamin B12   Result Value Ref Range    Vitamin B12 1,234 (H) 211 - 911 pg/mL   Folate   Result Value Ref Range    Folate, Serum 11.4 >5.0 ng/mL   POCT GLUCOSE   Result Value Ref Range    POCT Glucose 123 (H) 74 - 99 mg/dL   POCT GLUCOSE   Result Value Ref Range    POCT Glucose 121 (H) 74 - 99 mg/dL   POCT GLUCOSE   Result Value Ref Range    POCT Glucose 145 (H) 74 - 99 mg/dL     *Note: Due to a large number of results and/or encounters for the requested time period, some results have not been displayed. A complete set of results can be found in Results Review.            Assessment and Plan:  Patient is 79 y.o. female who is admitted to hospital for fall with acute blood loss anemia after scalp laceration. Nephrology consulted in view of ESRD.      [facility]         Anemia acute on chronic with hx of MDS-   Has been getting Micera and iron at her HD unit. 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  Patient is status post cholecystostomy tube placement on 4/27/2024.     Recommendations:   -Hemodialysis later today  -Transfuse for hemoglobin less than 7    Pinky Christie DO

## 2024-04-29 NOTE — PROGRESS NOTES
"GENERAL SURGERY PROGRESS NOTE    Tana Hargrove   1945   [id number]     Tana Hargrove is a 79 y.o. female on day 7 of admission presenting with Fall, initial encounter.    Diagnostic Laparoscopy, lysis of adhesions on 4/26/24, 3 Days Post-Op    Subjective  No acute events overnight. Tolerated low fat diet yesterday with no pain or nausea, PCT with 35 ml of bilious non purulent drainage. Tenderness near PCT site w/o erythema. Denies F/C/N/V/D/CP/SOB    Review of Systems:  Review of Systems   Constitutional:  Negative for chills and fever.   Respiratory:  Negative for chest tightness and shortness of breath.    Cardiovascular:  Negative for chest pain and leg swelling.   Gastrointestinal:  Positive for abdominal pain. Negative for nausea.   Musculoskeletal:  Negative for joint swelling.   Skin:  Negative for rash.   Neurological:  Negative for light-headedness.       Objective    Last Recorded Vitals  Blood pressure 151/88, pulse 80, temperature 36.7 °C (98.1 °F), temperature source Temporal, resp. rate 18, height 1.549 m (5' 1\"), weight 68 kg (150 lb), SpO2 94%.    Intake/Output last 3 Shifts:  I/O last 3 completed shifts:  In: - (0 mL/kg)   Out: 670 (9.8 mL/kg) [Urine:560 (0.2 mL/kg/hr); Drains:110]  Weight: 68 kg     Intake/Output Summary (Last 24 hours) at 4/29/2024 0808  Last data filed at 4/29/2024 0702  Gross per 24 hour   Intake --   Output 1325 ml   Net -1325 ml       Physical Exam  Constitutional:       General: She is not in acute distress.     Appearance: Normal appearance.   HENT:      Head: Normocephalic.      Comments: Posterior scalp laceration repaired with staples in place     Mouth/Throat:      Mouth: Mucous membranes are moist.      Pharynx: Oropharynx is clear.   Eyes:      General: No scleral icterus.     Extraocular Movements: Extraocular movements intact.      Conjunctiva/sclera: Conjunctivae normal.   Cardiovascular:      Rate and Rhythm: Normal rate and regular rhythm.      Pulses: Normal " pulses.   Pulmonary:      Effort: Pulmonary effort is normal.   Abdominal:      Palpations: Abdomen is soft.      Comments: PCT with 35 ml bilious drainage in bulb, Some tenderness near the tube and in the right upper quadrant. Incisions approximated, some mild drainage from umbilical incision, abdomen non distended, non tympanic   Musculoskeletal:         General: No swelling. Normal range of motion.      Cervical back: Neck supple.   Skin:     General: Skin is warm and dry.   Neurological:      General: No focal deficit present.      Mental Status: She is alert and oriented to person, place, and time.         Relevant Results  Labs:  Results for orders placed or performed during the hospital encounter of 04/22/24 (from the past 24 hour(s))   POCT GLUCOSE   Result Value Ref Range    POCT Glucose 132 (H) 74 - 99 mg/dL   POCT GLUCOSE   Result Value Ref Range    POCT Glucose 149 (H) 74 - 99 mg/dL   POCT GLUCOSE   Result Value Ref Range    POCT Glucose 186 (H) 74 - 99 mg/dL   CBC and Auto Differential   Result Value Ref Range    WBC 4.4 4.4 - 11.3 x10*3/uL    nRBC 0.0 0.0 - 0.0 /100 WBCs    RBC 2.48 (L) 4.00 - 5.20 x10*6/uL    Hemoglobin 7.5 (L) 12.0 - 16.0 g/dL    Hematocrit 23.1 (L) 36.0 - 46.0 %    MCV 93 80 - 100 fL    MCH 30.2 26.0 - 34.0 pg    MCHC 32.5 32.0 - 36.0 g/dL    RDW 22.4 (H) 11.5 - 14.5 %    Platelets 155 150 - 450 x10*3/uL    Neutrophils % 56.5 40.0 - 80.0 %    Immature Granulocytes %, Automated 0.5 0.0 - 0.9 %    Lymphocytes % 28.7 13.0 - 44.0 %    Monocytes % 10.6 2.0 - 10.0 %    Eosinophils % 3.2 0.0 - 6.0 %    Basophils % 0.5 0.0 - 2.0 %    Neutrophils Absolute 2.46 1.60 - 5.50 x10*3/uL    Immature Granulocytes Absolute, Automated 0.02 0.00 - 0.50 x10*3/uL    Lymphocytes Absolute 1.25 0.80 - 3.00 x10*3/uL    Monocytes Absolute 0.46 0.05 - 0.80 x10*3/uL    Eosinophils Absolute 0.14 0.00 - 0.40 x10*3/uL    Basophils Absolute 0.02 0.00 - 0.10 x10*3/uL   Basic Metabolic Panel   Result Value Ref Range     Glucose 157 (H) 74 - 99 mg/dL    Sodium 133 (L) 136 - 145 mmol/L    Potassium 4.1 3.5 - 5.3 mmol/L    Chloride 102 98 - 107 mmol/L    Bicarbonate 25 21 - 32 mmol/L    Anion Gap 10 10 - 20 mmol/L    Urea Nitrogen 31 (H) 6 - 23 mg/dL    Creatinine 2.73 (H) 0.50 - 1.05 mg/dL    eGFR 17 (L) >60 mL/min/1.73m*2    Calcium 8.6 8.6 - 10.3 mg/dL   Magnesium   Result Value Ref Range    Magnesium 1.60 1.60 - 2.40 mg/dL   Morphology   Result Value Ref Range    RBC Morphology See Below     Ovalocytes Few     Teardrop Cells Few     Basophilic Stippling Present    POCT GLUCOSE   Result Value Ref Range    POCT Glucose 123 (H) 74 - 99 mg/dL     *Note: Due to a large number of results and/or encounters for the requested time period, some results have not been displayed. A complete set of results can be found in Results Review.       Images:  IR biliary cholecystostomy   Final Result   Uneventful 8 Khmer cholecystostomy tube placement with no immediate   complication.        TUBE MANAGEMENT: Please leave cholecystostomy tube to external   gravity drainage and flush forward (towards patient and towards   drainage bag) with 5 cc normal saline solution daily to maintain tube   patency.        Cholecystostomy maintenance including tube check and management is   recommended in 8-12 weeks if the gallbladder is not removed   surgically.        I was present for and/or performed the critical portions of the   procedure and immediately available throughout the entire procedure.        I personally reviewed the image(s) / study and resident   interpretation. I agree with the findings as stated.        Performed and dictated at [facility].        MACRO:   None        Signed by: Jin House 4/27/2024 3:22 PM   Dictation workstation:   [id number]      NM hepatobiliary   Final Result   1. Nonvisualization of the gallbladder by 2 hours after radiotracer   injection, compatible with cystic duct  obstruction and acute   cholecystitis.        Findings were communicated with Dr. Adina Kumari by Dr. Day Wheeler via epic secure chat at 12:27 pm on 4/25/2024 with   verification.        I personally reviewed the images/study and I agree with the findings   as stated by Nuclear Medicine fellow aDy Wheeler MD. This study   was interpreted at [facility],   [place], [place].        MACRO:   Critical Finding:  See findings. Notification was initiated on   4/25/2024 at 12:27 pm by  Day Wheeler.  (**-YCF-**) Instructions:        Signed by: Leah Wiley 4/25/2024 1:13 PM   Dictation workstation:   [id number]      XR chest 1 view   Final Result   1.  No radiographic evidence of acute cardiopulmonary process.                  MACRO:   None.        Signed by: Rashida Sanchez 4/23/2024 6:44 AM   Dictation workstation:   [id number]      XR pelvis 1-2 views   Final Result   1.  No radiographic evidence for acute fracture.                  MACRO:   None.        Signed by: Rashida Sanchez 4/23/2024 6:40 AM   Dictation workstation:   [id number]      CT cervical spine wo IV contrast   Final Result        High posterior left parietal scalp laceration with small scalp   hematoma. No depressed skull fracture. No acute intracranial bleed or   focal mass effect.        Moderate volume loss in the brain.        No CT evidence of cervical spine fracture in this exam.        Cervical spine DJD as described.        Ground-glass infiltrative nodular density in the posterior right lung   apex is stable to slightly larger compared to CT scan chest from   04/13/2024. This was not present back on 04/06/2022. Small pneumonia   versus developing infiltrative tumor. Consider further evaluation   with PET-CT scan.        MACRO:   None        Signed by: Saurabh Suarez 4/22/2024 4:58 PM   Dictation workstation:   [id number]      CT head wo IV contrast   Final Result        High posterior left parietal  scalp laceration with small scalp   hematoma. No depressed skull fracture. No acute intracranial bleed or   focal mass effect.        Moderate volume loss in the brain.        No CT evidence of cervical spine fracture in this exam.        Cervical spine DJD as described.        Ground-glass infiltrative nodular density in the posterior right lung   apex is stable to slightly larger compared to CT scan chest from   04/13/2024. This was not present back on 04/06/2022. Small pneumonia   versus developing infiltrative tumor. Consider further evaluation   with PET-CT scan.        MACRO:   None        Signed by: Saurabh Suarez 4/22/2024 4:58 PM   Dictation workstation:   [id number]          Assessment and Plan  Active Problems:    Thickening of wall of gallbladder    Chronic cholecystitis    79 y.o. female with history of CAD, HTN, COPD, ESRD on HD, DM2, MDS, gout, neuropathy who presented following a mechanical fall and sustaining a posterior scalp laceration who developed right upper quadrant abdominal pain, HIDA consistent with acute cholecystitis, s/p lap KAREN 4/26, lap chidi aborted due to extensive adhesions now s/p PCT placement on 4/27    Plan:  -Follow up cultures from PCT drainage  -Continue low fat diet  -Continue drain flushes, will attempt to place a 3-way valve  -Continue IV antibiotics, will need 1 week total since date of PCT placement 4/27  -Scalp laceration staples to be removed in 7-10 days  -Other cares per primary team    Discussed with attending Dr. Sydney Burgess DO, PGY-2  General Surgery

## 2024-04-29 NOTE — PROGRESS NOTES
Confirmed with patient that she is planning to return home with resumption of [facility] and resumption of outpatient HD.

## 2024-04-29 NOTE — PROGRESS NOTES
Occupational Therapy                 Therapy Communication Note    Patient Name: Tana Hargrove  MRN: [id number]  Today's Date: 4/29/2024     Discipline: Occupational Therapy    Missed Visit Reason: Missed Visit Reason: Patient in a medical procedure (pt. recieving dialysis)    Missed Time: Attempt 14:52p.m.    Comment:

## 2024-04-30 ENCOUNTER — PHARMACY VISIT (OUTPATIENT)
Dept: PHARMACY | Facility: CLINIC | Age: 79
End: 2024-04-30
Payer: MEDICARE

## 2024-04-30 VITALS
TEMPERATURE: 97.5 F | OXYGEN SATURATION: 95 % | BODY MASS INDEX: 28.32 KG/M2 | HEART RATE: 55 BPM | RESPIRATION RATE: 18 BRPM | SYSTOLIC BLOOD PRESSURE: 140 MMHG | WEIGHT: 150 LBS | DIASTOLIC BLOOD PRESSURE: 64 MMHG | HEIGHT: 61 IN

## 2024-04-30 LAB
GLUCOSE BLD MANUAL STRIP-MCNC: 113 MG/DL (ref 74–99)
GLUCOSE BLD MANUAL STRIP-MCNC: 159 MG/DL (ref 74–99)

## 2024-04-30 PROCEDURE — 2500000001 HC RX 250 WO HCPCS SELF ADMINISTERED DRUGS (ALT 637 FOR MEDICARE OP): Performed by: INTERNAL MEDICINE

## 2024-04-30 PROCEDURE — 2500000001 HC RX 250 WO HCPCS SELF ADMINISTERED DRUGS (ALT 637 FOR MEDICARE OP): Performed by: SURGERY

## 2024-04-30 PROCEDURE — 2500000004 HC RX 250 GENERAL PHARMACY W/ HCPCS (ALT 636 FOR OP/ED): Performed by: SURGERY

## 2024-04-30 PROCEDURE — 2500000006 HC RX 250 W HCPCS SELF ADMINISTERED DRUGS (ALT 637 FOR ALL PAYERS): Performed by: SURGERY

## 2024-04-30 PROCEDURE — 97110 THERAPEUTIC EXERCISES: CPT | Mod: GP,CQ | Performed by: PHYSICAL THERAPY ASSISTANT

## 2024-04-30 PROCEDURE — RXMED WILLOW AMBULATORY MEDICATION CHARGE

## 2024-04-30 PROCEDURE — 2500000005 HC RX 250 GENERAL PHARMACY W/O HCPCS: Performed by: SURGERY

## 2024-04-30 PROCEDURE — 97116 GAIT TRAINING THERAPY: CPT | Mod: GP,CQ | Performed by: PHYSICAL THERAPY ASSISTANT

## 2024-04-30 PROCEDURE — 99239 HOSP IP/OBS DSCHRG MGMT >30: CPT | Performed by: INTERNAL MEDICINE

## 2024-04-30 PROCEDURE — 82947 ASSAY GLUCOSE BLOOD QUANT: CPT

## 2024-04-30 RX ORDER — CEFDINIR 300 MG/1
300 CAPSULE ORAL DAILY
Qty: 7 CAPSULE | Refills: 0 | Status: SHIPPED | OUTPATIENT
Start: 2024-04-30 | End: 2024-05-07

## 2024-04-30 RX ORDER — CEFDINIR 300 MG/1
300 CAPSULE ORAL 2 TIMES DAILY
Qty: 14 CAPSULE | Refills: 0 | Status: SHIPPED | OUTPATIENT
Start: 2024-04-30 | End: 2024-04-30 | Stop reason: HOSPADM

## 2024-04-30 RX ORDER — SODIUM CHLORIDE 0.9 % (FLUSH) 0.9 %
10 SYRINGE (ML) INJECTION EVERY 8 HOURS
Status: DISCONTINUED | OUTPATIENT
Start: 2024-04-30 | End: 2024-04-30 | Stop reason: HOSPADM

## 2024-04-30 RX ADMIN — ACETAMINOPHEN 650 MG: 325 TABLET ORAL at 00:30

## 2024-04-30 RX ADMIN — PREGABALIN 75 MG: 75 CAPSULE ORAL at 08:12

## 2024-04-30 RX ADMIN — COLCHICINE 1.2 MG: 0.6 TABLET ORAL at 08:13

## 2024-04-30 RX ADMIN — SEVELAMER CARBONATE 800 MG: 800 TABLET, FILM COATED ORAL at 11:34

## 2024-04-30 RX ADMIN — ASPIRIN 81 MG CHEWABLE TABLET 81 MG: 81 TABLET CHEWABLE at 08:12

## 2024-04-30 RX ADMIN — HYDROMORPHONE HYDROCHLORIDE 2 MG: 2 TABLET ORAL at 01:46

## 2024-04-30 RX ADMIN — SEVELAMER CARBONATE 800 MG: 800 TABLET, FILM COATED ORAL at 08:12

## 2024-04-30 RX ADMIN — METOPROLOL TARTRATE 25 MG: 25 TABLET, FILM COATED ORAL at 08:12

## 2024-04-30 RX ADMIN — AMLODIPINE BESYLATE 5 MG: 5 TABLET ORAL at 08:12

## 2024-04-30 RX ADMIN — ALLOPURINOL 100 MG: 100 TABLET ORAL at 08:12

## 2024-04-30 RX ADMIN — POLYETHYLENE GLYCOL 3350 17 G: 17 POWDER, FOR SOLUTION ORAL at 08:17

## 2024-04-30 RX ADMIN — CYANOCOBALAMIN TAB 1000 MCG 1000 MCG: 1000 TAB at 08:12

## 2024-04-30 RX ADMIN — Medication 2000 UNITS: at 08:12

## 2024-04-30 RX ADMIN — BACITRACIN ZINC, NEOMYCIN, POLYMYXIN B 10 APPLICATION: 400; 3.5; 5 OINTMENT TOPICAL at 08:12

## 2024-04-30 RX ADMIN — ACETAMINOPHEN 650 MG: 325 TABLET ORAL at 08:13

## 2024-04-30 ASSESSMENT — PAIN SCALES - GENERAL
PAINLEVEL_OUTOF10: 0 - NO PAIN
PAINLEVEL_OUTOF10: 4
PAINLEVEL_OUTOF10: 7
PAINLEVEL_OUTOF10: 2

## 2024-04-30 ASSESSMENT — ENCOUNTER SYMPTOMS
ABDOMINAL PAIN: 1
CHILLS: 0
CHEST TIGHTNESS: 0
JOINT SWELLING: 0
FEVER: 0
SHORTNESS OF BREATH: 0
LIGHT-HEADEDNESS: 0
NAUSEA: 0

## 2024-04-30 ASSESSMENT — COGNITIVE AND FUNCTIONAL STATUS - GENERAL
TOILETING: A LITTLE
WALKING IN HOSPITAL ROOM: A LITTLE
DAILY ACTIVITIY SCORE: 19
PERSONAL GROOMING: A LITTLE
STANDING UP FROM CHAIR USING ARMS: A LITTLE
HELP NEEDED FOR BATHING: A LITTLE
CLIMB 3 TO 5 STEPS WITH RAILING: TOTAL
TURNING FROM BACK TO SIDE WHILE IN FLAT BAD: A LITTLE
DRESSING REGULAR UPPER BODY CLOTHING: A LITTLE
MOVING TO AND FROM BED TO CHAIR: A LITTLE
MOBILITY SCORE: 17
CLIMB 3 TO 5 STEPS WITH RAILING: A LOT
EATING MEALS: A LITTLE
WALKING IN HOSPITAL ROOM: A LITTLE
MOBILITY SCORE: 20
STANDING UP FROM CHAIR USING ARMS: A LITTLE

## 2024-04-30 ASSESSMENT — PAIN DESCRIPTION - DESCRIPTORS: DESCRIPTORS: STABBING;SHOOTING

## 2024-04-30 ASSESSMENT — PAIN - FUNCTIONAL ASSESSMENT
PAIN_FUNCTIONAL_ASSESSMENT: 0-10

## 2024-04-30 NOTE — PROGRESS NOTES
Tana Hargrove is a 79 y.o. female on day 8 of admission presenting with Fall, initial encounter.      Subjective   79F hx CAD, HTN, COPD, ESRD on HD via permacath, DM2, MDS, gout, neuropathy presents for fall     Patient thinks that she slipped on the floor this afternoon and fell backwards because of that but unsure. thinks knees may have given out as well. felt well after her hospitalization w/o abd pain, n/v, generalized or focal weakness, f/c. has been eating ok. denies dizziness or syncope/loss of consciousness. fell back and hit her buttocks and head. head bled for awhile, now self-resolved w/o sutures. no pain in buttocks/back. no other injuries. not able to get up on own until got help 4h later. no recent cp, sob, diarrhea, dysuria, ha, blurry vision, numbness. taking asa.     4/23: Patient was seen and examined.  She is awake and alert and interactive today.  Multiple changes of bandage dressing due to soaked with blood.  Being transfused with 1 unit of packed red blood cells and 1 unit coming up later today.  Will repeat H&H likely transfuse with 10 units.  Patient completed hemodialysis later today.  PT OT to evaluate for functional status.  Repeat H&H at 6 PM.  Repeat CBC and BMP.  4/24: Patient was seen and examined.  She had an episode of continued bleeding overnight and required more stable on the scalp in order to achieve hemostasis.  I will consult general/trauma surgery to evaluate scalp staples.  Trend CBC and transfuse with hemoglobin less than 7.  Seen by PT OT who recommend home with home health.  Potential discharge within the next 24 to 48 hours  4/25: Patient was seen and examined.  Developed right upper quadrant pain overnight after eating a hamburger for dinner.  Hemoglobin has remained stable at 7.6 this morning.  Seen by general surgery who has ordered a HIDA scan.  General surgery to consider cholecystectomy during this admission.  4/26: Patient was seen and examined.  HIDA scan  yesterday showed signs of acute cholecystitis.  Hemoglobin still borderline at 7.1 but holding.  Was seen by cardiology and I spoke with cardiology who placed her at moderate to high risk of surgery however no further testing will impact this risk.  Possible cholecystectomy later today.  Consider transfusion of 1 more unit of packed red blood cells intraoperatively and within the next 24 hours.  Repeat CBC and BMP in a.m.  4/27: Patient is awaiting cholecystostomy tube today we will have nursing call surgery and also call down to specialist to see what the timing of her cholecystostomy tube is.  Clinically looks to be doing well otherwise but very tender in the right upper quadrant.  4/28: Patient's status post cholecystostomy tube.  Drainage appears relatively clear.  Continue with antibiotics advancing diet.  Continue with hemodialysis.  On discharge plan is for discharge home with cholecystostomy tube and outpatient follow-up with [facility].  Increasing patient's activity.  4/29: Pleuritic pain is improving dose of colchicine again tomorrow will go home on [employer].  For 7 days.  4/30: Patient doing well plan to discharge home today.  See after visit summary for complete plan.    35 minutes spent in the care of this patient.  Objective     Last Recorded Vitals  /64 (BP Location: Left arm, Patient Position: Sitting)   Pulse 55   Temp 36.4 °C (97.5 °F) (Temporal)   Resp 18   Wt 68 kg (150 lb)   SpO2 95%   Intake/Output last 3 Shifts:    Intake/Output Summary (Last 24 hours) at 4/30/2024 1452  Last data filed at 4/30/2024 1318  Gross per 24 hour   Intake 1120 ml   Output 3050 ml   Net -1930 ml       Admission Weight  Weight: 68 kg (150 lb) (04/22/24 1443)    Daily Weight  04/22/24 : 68 kg (150 lb)    Image Results  IR biliary cholecystostomy  Narrative: Interpreted By:  Jin House,   STUDY:  IR BILIARY CHOLECYSTOSTOMY;  4/27/2024 2:35 pm      INDICATION:  Signs/Symptoms:Percutaneous  cholecystostomy tube for acute on chronic  cholecystitis.      COMPARISON:  None.      ACCESSION NUMBER(S):  [id number]      ORDERING CLINICIAN:  ANNIE HENRY      TECHNIQUE:  INTERVENTIONALIST(S):  Jin House MD      CONSENT:  The patient/patient's POA/next of kin was informed of the nature of  the proposed procedure. The purposes, alternatives, risks, and  benefits were explained and discussed. All questions were answered  and consent was obtained.      RADIATION EXPOSURE:  Fluoroscopy time: 1.2 min.  Dose: 13.96 mGy.  Dose Area Product (DAP): 1.72 Gy cm 2.      SEDATION:  Moderate conscious IV sedation services (supervision of  administration, induction, and maintenance) were provided by the  physician performing the procedure with intravenous fentanyl 25 mcg  and versed 0.5 mg for 22 minutes. The physician was assisted by an  independent trained observer, an interventional radiology nurse, in  the continuous monitoring of patient level of consciousness and  physiologic status.      MEDICATION/CONTRAST:  No additional      TIME OUT:  A time out was performed immediately prior to procedure start with  the interventional team, correctly identifying the patient name, date  of birth, MRN, procedure, anatomy (including marking of site and  side), patient position, procedure consent form, relevant laboratory  and imaging test results, antibiotic administration, safety  precautions, and procedure-specific equipment needs.      COMPLICATIONS:  No immediate adverse events identified.      FINDINGS:  The right upper abdomen was prepped and draped in standard sterile  fashion. Initial ultrasound images demonstrate a mildly dilated  gallbladder containing several calcified stones. After local  anesthesia with lidocaine, a Yueh needle was directed towards the  gallbladder under sonographic guidance using a transhepatic approach.  An ultrasound image was stored for the permanent record. The stylet  was removed,  and reflux of bile was seen.      A bile sample was collected and sent to the laboratory for further  testing.      Access was maintained with an 0.035 inch Payne wire, which was  coiled in the gallbladder. Serial dilatation over a 0.035-inch Payne  wire for an eventual 8 Romanian cholecystostomy tube was performed with  intermittent image guidance. Pigtail loop of the cholecystostomy tube  was formed and locked in the gallbladder. Position was confirmed with  follow-up cholecystography.      Tube was secured at the skin with a 3-0 Prolene suture and affixed  with an adhesive dressing. Cholecystostomy tube was then opened to  external gravity drainage. The patient tolerated procedure well  without immediate complication and was transported back in stable  condition.      Impression: Uneventful 8 Romanian cholecystostomy tube placement with no immediate  complication.      TUBE MANAGEMENT: Please leave cholecystostomy tube to external  gravity drainage and flush forward (towards patient and towards  drainage bag) with 5 cc normal saline solution daily to maintain tube  patency.      Cholecystostomy maintenance including tube check and management is  recommended in 8-12 weeks if the gallbladder is not removed  surgically.      I was present for and/or performed the critical portions of the  procedure and immediately available throughout the entire procedure.      I personally reviewed the image(s) / study and resident  interpretation. I agree with the findings as stated.      Performed and dictated at [facility].      MACRO:  None      Signed by: Jin House 4/27/2024 3:22 PM  Dictation workstation:   [id number]      Physical Exam  General: NAD, well-appearing, cooperative. no jaundice, pallor, rash, bruises  HEENT: NC/AT, MMM, no oral lesions or pharyngeal erythema. PERRL. no scleral icterus or conjunctival injection. neck soft w/o masses or LAD. superficial scalp lac  well-approximated. no active bleed  CV: RRR, no murmurs, rubs, or gallops. No JVD.  Chest: breathing unlabored. CTAB w/ adequate air-entry. permacath c/d/i  Abd: Pain in the right upper quadrant   Extr: wwp, 2+ and symmetric peripheral pulses. no LE edema.  Neuro: AAOx3. CNII-XII intact. no focal findings.       Relevant Results             Scheduled medications  acetaminophen, 650 mg, oral, q8h  allopurinol, 100 mg, oral, Daily  amLODIPine, 5 mg, oral, Daily  aspirin, 81 mg, oral, Daily  cefdinir, 300 mg, oral, Daily  cholecalciferol, 2,000 Units, oral, Daily  cyanocobalamin, 1,000 mcg, oral, Daily  [Held by provider] heparin (porcine), 5,000 Units, subcutaneous, q12h  insulin lispro, 0-5 Units, subcutaneous, TID with meals  lidocaine, 1 patch, transdermal, Daily  metoprolol tartrate, 25 mg, oral, BID  neomycin-bacitracnZn-polymyxnB, , Topical, Daily  pravastatin, 40 mg, oral, Nightly  pregabalin, 75 mg, oral, BID  sevelamer carbonate, 800 mg, oral, TID with meals  sodium chloride 0.9%, 10 mL, intra-catheter, q8h      Continuous medications     PRN medications  PRN medications: dextrose, dextrose, diphenhydrAMINE, glucagon, glucagon, HYDROmorphone, HYDROmorphone, HYDROmorphone, ipratropium-albuteroL, melatonin, ondansetron **OR** ondansetron, polyethylene glycol    Assessment/Plan        Ground-level fall mechanical  Left partial scalp lacerations status post staples  Cholecystitis chronic status post cholecystostomy tube on 4/27  Pleurisy  End-stage renal disease on hemodialysis  History of gout  Type 2 diabetes  Neuropathy  Hypertension  Coronary disease  Acute on chronic blood loss anemia  DVT prophylaxis-SCDs subcu heparin    Plan discharge today with cholecystostomy tube see after visit summary for complete plan.                      Spent 35 minutes in the follow-up management of this patient today.    Warren Head MD

## 2024-04-30 NOTE — PROGRESS NOTES
Nephrology Progress Note    Following for ESRD.    Up in chair and feeling well today. Bowels moved well. No SOB or edema.       Current Inpatient Medications:  Current Facility-Administered Medications Ordered in Epic   Medication Dose Route Frequency Provider Last Rate Last Admin    acetaminophen (Tylenol) tablet 650 mg  650 mg oral q8h Ronald Grimes MD   650 mg at 04/30/24 0813    allopurinol (Zyloprim) tablet 100 mg  100 mg oral Daily Ronald Grimes MD   100 mg at 04/30/24 0812    amLODIPine (Norvasc) tablet 5 mg  5 mg oral Daily Ronald Grimes MD   5 mg at 04/30/24 0812    aspirin chewable tablet 81 mg  81 mg oral Daily Ronald Grimes MD   81 mg at 04/30/24 0812    cefdinir (Omnicef) capsule 300 mg  300 mg oral Daily Warren Head MD   300 mg at 04/29/24 2016    cholecalciferol (Vitamin D-3) tablet 2,000 Units  2,000 Units oral Daily Ronald Grimes MD   2,000 Units at 04/30/24 0812    cyanocobalamin (Vitamin B-12) tablet 1,000 mcg  1,000 mcg oral Daily Ronald Grimes MD   1,000 mcg at 04/30/24 0812    dextrose 50 % injection 12.5 g  12.5 g intravenous q15 min PRN Ronald Grimes MD        dextrose 50 % injection 25 g  25 g intravenous q15 min PRN Ronald Grimes MD        diphenhydrAMINE (BENADryl) liquid 12.5 mg  12.5 mg oral q8h PRN Ashwin Cruz DO   12.5 mg at 04/26/24 2027    glucagon (Glucagen) injection 1 mg  1 mg intramuscular q15 min PRN Ronald Grimes MD        glucagon (Glucagen) injection 1 mg  1 mg intramuscular q15 min PRN Ronald Grimes MD        [Held by provider] heparin (porcine) injection 5,000 Units  5,000 Units subcutaneous q12h Ronald Grimes MD   5,000 Units at 04/28/24 0916    HYDROmorphone (Dilaudid) tablet 1 mg  1 mg oral q6h PRN Ronald Grimes MD   1 mg at 04/26/24 1823    HYDROmorphone (Dilaudid) tablet 2 mg  2 mg oral q6h PRN Ronald Grimes MD   2 mg at 04/30/24 0141     "HYDROmorphone PF (Dilaudid) injection 0.2 mg  0.2 mg intravenous q4h PRN Ronald Grimes MD   0.2 mg at 24    insulin lispro (HumaLOG) injection 0-5 Units  0-5 Units subcutaneous TID with meals Ronald Grimes MD   2 Units at 24 123    ipratropium-albuteroL (Duo-Neb) 0.5-2.5 mg/3 mL nebulizer solution 3 mL  3 mL nebulization q6h PRN Ronald Grimes MD        lidocaine 4 % patch 1 patch  1 patch transdermal Daily Ronald Grimes MD        melatonin tablet 3 mg  3 mg oral Nightly PRN Ronald Grimes MD        metoprolol tartrate (Lopressor) tablet 25 mg  25 mg oral BID Ronald Grimes MD   25 mg at 24 0812    neomycin-bacitracin-polymyxin (Neosporin) ointment foil packet   Topical Daily Ronald Grimes MD   10 Application at 24 0812    ondansetron (Zofran) tablet 4 mg  4 mg oral q8h PRN Ronald Grimes MD        Or    ondansetron (Zofran) injection 4 mg  4 mg intravenous q8h PRN Ronald Grimes MD   4 mg at 24 1328    polyethylene glycol (Glycolax, Miralax) packet 17 g  17 g oral Daily PRN Ronald Grimes MD   17 g at 24 0817    pravastatin (Pravachol) tablet 40 mg  40 mg oral Nightly Ronald Grimes MD   40 mg at 24    pregabalin (Lyrica) capsule 75 mg  75 mg oral BID Ronald Grimes MD   75 mg at 24 0812    sevelamer carbonate (Renvela) tablet 800 mg  800 mg oral TID with meals Ronald Grimes MD   800 mg at 24 0812     No current Epic-ordered outpatient medications on file.         Vitals:   BP (!) 182/66 (BP Location: Left arm, Patient Position: Sitting) Comment: RN Yulisa notified of BP of 182/66  Pulse 54 Comment: RN Yulisa notified of HR of 54  Temp 36.2 °C (97.2 °F) (Temporal)   Resp 18   Ht 1.549 m (5' 1\")   Wt 68 kg (150 lb)   SpO2 96%   BMI 28.34 kg/m²   BLOOD PRESSURE RANGE:  Systolic (24hrs), Av , Min:150 , Max:182   ; Diastolic (24hrs), " Av, Min:52, Max:71    24HR INTAKE/OUTPUT:    Intake/Output Summary (Last 24 hours) at 2024 1032  Last data filed at 2024 0909  Gross per 24 hour   Intake 1560 ml   Output 3750 ml   Net -2190 ml       Physical exam:   Alert and oriented x 3 NAD  Neck: no JVD  CV: RRR  Lungs: CTA bilaterally  Abd: soft, NT, ND   Ext: no lower extremity edema        Data:   Labs:  Results for orders placed or performed during the hospital encounter of 24 (from the past 24 hour(s))   POCT GLUCOSE   Result Value Ref Range    POCT Glucose 145 (H) 74 - 99 mg/dL   POCT GLUCOSE   Result Value Ref Range    POCT Glucose 129 (H) 74 - 99 mg/dL   POCT GLUCOSE   Result Value Ref Range    POCT Glucose 144 (H) 74 - 99 mg/dL   POCT GLUCOSE   Result Value Ref Range    POCT Glucose 113 (H) 74 - 99 mg/dL     *Note: Due to a large number of results and/or encounters for the requested time period, some results have not been displayed. A complete set of results can be found in Results Review.            Assessment and Plan:  Patient is 79 y.o. female who is admitted to hospital for fall with acute blood loss anemia after scalp laceration. Nephrology consulted in view of ESRD.      ESRD- HD MWF[facility]         Anemia acute on chronic with hx of MDS-   Has been getting Micera and iron at her HD unit. 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  Patient is status post cholecystostomy tube placement on 2024.     Recommendations:   -Hemodialysis tomorrow and Continue outpatient HD at [facility] upon discharge  -Transfuse for hemoglobin less than 7    Josue Ramirez PA-C

## 2024-04-30 NOTE — PROGRESS NOTES
"GENERAL SURGERY PROGRESS NOTE    Tana Hargrove   1945   [id number]     Tana Hargrove is a 79 y.o. female on day 8 of admission presenting with Fall, initial encounter.    Diagnostic Laparoscopy, lysis of adhesions on 4/26/24, 4 Days Post-Op    Subjective  No acute events overnight. PCT with 50 ml of bilious non purulent drainage. 3 way valve on drain and able to flush. No more tenderness near PCT site w/o erythema. Denies F/C/N/V/D/CP/SOB    Review of Systems:  Review of Systems   Constitutional:  Negative for chills and fever.   Respiratory:  Negative for chest tightness and shortness of breath.    Cardiovascular:  Negative for chest pain and leg swelling.   Gastrointestinal:  Positive for abdominal pain. Negative for nausea.   Musculoskeletal:  Negative for joint swelling.   Skin:  Negative for rash.   Neurological:  Negative for light-headedness.       Objective    Last Recorded Vitals  Blood pressure 140/64, pulse 55, temperature 36.4 °C (97.5 °F), temperature source Temporal, resp. rate 18, height 1.549 m (5' 1\"), weight 68 kg (150 lb), SpO2 95%.    Intake/Output last 3 Shifts:  I/O last 3 completed shifts:  In: 1560 (22.9 mL/kg) [P.O.:960; I.V.:200 (2.9 mL/kg); Other:400]  Out: 4640 (68.2 mL/kg) [Urine:2150 (0.9 mL/kg/hr); Drains:90; Other:2400]  Weight: 68 kg     Intake/Output Summary (Last 24 hours) at 4/30/2024 1517  Last data filed at 4/30/2024 1318  Gross per 24 hour   Intake 1120 ml   Output 3050 ml   Net -1930 ml       Physical Exam  Constitutional:       General: She is not in acute distress.     Appearance: Normal appearance.   HENT:      Head: Normocephalic.      Comments: Posterior scalp laceration repaired with staples in place     Mouth/Throat:      Mouth: Mucous membranes are moist.      Pharynx: Oropharynx is clear.   Eyes:      General: No scleral icterus.     Extraocular Movements: Extraocular movements intact.      Conjunctiva/sclera: Conjunctivae normal.   Cardiovascular:      Rate and " Rhythm: Normal rate and regular rhythm.      Pulses: Normal pulses.   Pulmonary:      Effort: Pulmonary effort is normal.   Abdominal:      Palpations: Abdomen is soft.      Comments: PCT with 3 way valve and gravity bag placed, . Incisions approximated, some mild drainage from umbilical incision, abdomen non distended, non tympanic, PCT site non erythematous   Musculoskeletal:         General: No swelling. Normal range of motion.      Cervical back: Neck supple.   Skin:     General: Skin is warm and dry.   Neurological:      General: No focal deficit present.      Mental Status: She is alert and oriented to person, place, and time.         Relevant Results  Labs:  Results for orders placed or performed during the hospital encounter of 04/22/24 (from the past 24 hour(s))   POCT GLUCOSE   Result Value Ref Range    POCT Glucose 129 (H) 74 - 99 mg/dL   POCT GLUCOSE   Result Value Ref Range    POCT Glucose 144 (H) 74 - 99 mg/dL   POCT GLUCOSE   Result Value Ref Range    POCT Glucose 113 (H) 74 - 99 mg/dL   POCT GLUCOSE   Result Value Ref Range    POCT Glucose 159 (H) 74 - 99 mg/dL     *Note: Due to a large number of results and/or encounters for the requested time period, some results have not been displayed. A complete set of results can be found in Results Review.       Images:  IR biliary cholecystostomy   Final Result   Uneventful 8 [place] cholecystostomy tube placement with no immediate   complication.        TUBE MANAGEMENT: Please leave cholecystostomy tube to external   gravity drainage and flush forward (towards patient and towards   drainage bag) with 5 cc normal saline solution daily to maintain tube   patency.        Cholecystostomy maintenance including tube check and management is   recommended in 8-12 weeks if the gallbladder is not removed   surgically.        I was present for and/or performed the critical portions of the   procedure and immediately available throughout the entire procedure.        I  personally reviewed the image(s) / study and resident   interpretation. I agree with the findings as stated.        Performed and dictated at [facility].        MACRO:   None        Signed by: Jin House 4/27/2024 3:22 PM   Dictation workstation:   [id number]      NM hepatobiliary   Final Result   1. Nonvisualization of the gallbladder by 2 hours after radiotracer   injection, compatible with cystic duct obstruction and acute   cholecystitis.        Findings were communicated with Dr. Adina Kumari by Dr. Day Wheeler via epic secure chat at 12:27 pm on 4/25/2024 with   verification.        I personally reviewed the images/study and I agree with the findings   as stated by Nuclear Medicine fellow Day Wheeler MD. This study   was interpreted at [place], [place].        MACRO:   Critical Finding:  See findings. Notification was initiated on   4/25/2024 at 12:27 pm by  Day Wheeler.  (**-YCF-**) Instructions:        Signed by: Leah Wiley 4/25/2024 1:13 PM   Dictation workstation:   [id number]      XR chest 1 view   Final Result   1.  No radiographic evidence of acute cardiopulmonary process.                  MACRO:   None.        Signed by: Rashida Sanchez 4/23/2024 6:44 AM   Dictation workstation:   [id number]      XR pelvis 1-2 views   Final Result   1.  No radiographic evidence for acute fracture.                  MACRO:   None.        Signed by: Rashida Sanchez 4/23/2024 6:40 AM   Dictation workstation:   [id number]      CT cervical spine wo IV contrast   Final Result        High posterior left parietal scalp laceration with small scalp   hematoma. No depressed skull fracture. No acute intracranial bleed or   focal mass effect.        Moderate volume loss in the brain.        No CT evidence of cervical spine fracture in this exam.        Cervical spine DJD as described.        Ground-glass infiltrative nodular  density in the posterior right lung   apex is stable to slightly larger compared to CT scan chest from   04/13/2024. This was not present back on 04/06/2022. Small pneumonia   versus developing infiltrative tumor. Consider further evaluation   with PET-CT scan.        MACRO:   None        Signed by: Saurabh Suarez 4/22/2024 4:58 PM   Dictation workstation:   [id number]      CT head wo IV contrast   Final Result        High posterior left parietal scalp laceration with small scalp   hematoma. No depressed skull fracture. No acute intracranial bleed or   focal mass effect.        Moderate volume loss in the brain.        No CT evidence of cervical spine fracture in this exam.        Cervical spine DJD as described.        Ground-glass infiltrative nodular density in the posterior right lung   apex is stable to slightly larger compared to CT scan chest from   04/13/2024. This was not present back on 04/06/2022. Small pneumonia   versus developing infiltrative tumor. Consider further evaluation   with PET-CT scan.        MACRO:   None        Signed by: Saurabh Suarez 4/22/2024 4:58 PM   Dictation workstation:   [id number]          Assessment and Plan  Active Problems:    Thickening of wall of gallbladder    Chronic cholecystitis    79 y.o. female with history of CAD, HTN, COPD, ESRD on HD, DM2, MDS, gout, neuropathy who presented following a mechanical fall and sustaining a posterior scalp laceration who developed right upper quadrant abdominal pain, HIDA consistent with acute cholecystitis, s/p lap KAREN 4/26, lap chidi aborted due to extensive adhesions now s/p PCT placement on 4/27    Plan:  -Flush PCT drain as directed  -Continue low fat diet  -Continue  antibiotics, will be dc on cefdinir per medicine  -Scalp laceration staples to be removed in 7-10 days  -Other cares per primary team  - Ok for discharge    Discussed with attending Dr. Sydney Burgess DO, PGY-2  General Surgery

## 2024-04-30 NOTE — CARE PLAN
"The patient's goals for the shift include \"For my head to stop bleeding and to sleep.\"    The clinical goals for the shift include maintain patient safety    Problem: Pain - Adult  Goal: Verbalizes/displays adequate comfort level or baseline comfort level  Outcome: Progressing     Problem: Discharge Planning  Goal: Discharge to home or other facility with appropriate resources  Outcome: Progressing     Problem: Chronic Conditions and Co-morbidities  Goal: Patient's chronic conditions and co-morbidity symptoms are monitored and maintained or improved  Outcome: Progressing     Problem: Diabetes  Goal: Maintain electrolyte levels within acceptable range throughout shift  Outcome: Progressing  Goal: Maintain glucose levels >70mg/dl to <250mg/dl throughout shift  Outcome: Progressing  Goal: Learn about and adhere to nutrition recommendations by end of shift  Outcome: Progressing  Goal: Vital signs within normal range for age by end of shift  Outcome: Progressing  Goal: Increase self care and/or family involovement by end of shift  Outcome: Progressing  Goal: Receive DSME education by end of shift  Outcome: Progressing     Problem: Nutrition  Goal: Oral intake greater than 50%  Outcome: Progressing  Goal: Adequate PO fluid intake  Outcome: Progressing  Goal: Nutrition support goals are met within 48 hrs  Outcome: Progressing  Goal: Nutrition support is meeting 75% of nutrient needs  Outcome: Progressing  Goal: BG  mg/dL  Outcome: Progressing  Goal: Lab values WNL  Outcome: Progressing  Goal: Electrolytes WNL  Outcome: Progressing  Goal: Promote healing  Outcome: Progressing  Goal: Maintain stable weight  Outcome: Progressing  Goal: Reduce weight from edema/fluid  Outcome: Progressing     Problem: Skin  Goal: Decreased wound size/increased tissue granulation at next dressing change  Outcome: Progressing  Flowsheets (Taken 4/30/2024 6793)  Decreased wound size/increased tissue granulation at next dressing change: " Promote sleep for wound healing  Goal: Participates in plan/prevention/treatment measures  Outcome: Progressing  Flowsheets (Taken 4/30/2024 0710)  Participates in plan/prevention/treatment measures: Elevate heels  Goal: Prevent/manage excess moisture  Outcome: Progressing  Flowsheets (Taken 4/30/2024 0710)  Prevent/manage excess moisture: Monitor for/manage infection if present  Goal: Prevent/minimize sheer/friction injuries  Outcome: Progressing  Flowsheets (Taken 4/30/2024 0710)  Prevent/minimize sheer/friction injuries:   Use pull sheet   Increase activity/out of bed for meals  Goal: Promote/optimize nutrition  Outcome: Progressing  Flowsheets (Taken 4/30/2024 0710)  Promote/optimize nutrition: Assist with feeding  Goal: Promote skin healing  Outcome: Progressing  Flowsheets (Taken 4/30/2024 0710)  Promote skin healing: Assess skin/pad under line(s)/device(s)     Problem: Fall/Injury  Goal: Verbalize understanding of personal risk factors for fall in the hospital  Outcome: Progressing  Goal: Verbalize understanding of risk factor reduction measures to prevent injury from fall in the home  Outcome: Progressing  Goal: Use assistive devices by end of the shift  Outcome: Progressing     Problem: Pain  Goal: Takes deep breaths with improved pain control throughout the shift  Outcome: Progressing  Goal: Turns in bed with improved pain control throughout the shift  Outcome: Progressing  Goal: Walks with improved pain control throughout the shift  Outcome: Progressing  Goal: Performs ADL's with improved pain control throughout shift  Outcome: Progressing  Goal: Participates in PT with improved pain control throughout the shift  Outcome: Progressing  Goal: Free from opioid side effects throughout the shift  Outcome: Progressing  Goal: Free from acute confusion related to pain meds throughout the shift  Outcome: Progressing

## 2024-04-30 NOTE — NURSING NOTE
Patient discharged per physician order. Patient verbalized understanding of discharge instructions and copy of instructions sent with patient. Patient educated on drain flushing. IV taken out x2 per policy with catheter tip intact. Patient given prescriptions via meds to beds. Patient picked up by friend and wheeled out by volunteers.

## 2024-04-30 NOTE — CARE PLAN
"  Problem: Fall/Injury  Goal: Not fall by end of shift  Outcome: Met  Goal: Be free from injury by end of the shift  Outcome: Met  Goal: Pace activities to prevent fatigue by end of the shift  Outcome: Met     Problem: Pain - Adult  Goal: Verbalizes/displays adequate comfort level or baseline comfort level  Outcome: Progressing     Problem: Safety - Adult  Goal: Free from fall injury  Outcome: Met     Problem: Chronic Conditions and Co-morbidities  Goal: Patient's chronic conditions and co-morbidity symptoms are monitored and maintained or improved  Outcome: Progressing     Problem: Diabetes  Goal: No changes in neurological exam by end of shift  Outcome: Met   The patient's goals for the shift include \"For my head to stop bleeding and to sleep.\"    The clinical goals for the shift include Tana's drain will remain intact this shift.    "

## 2024-04-30 NOTE — DISCHARGE SUMMARY
Discharge Diagnosis  Fall, initial encounter    Ground-level fall mechanical  Left partial scalp lacerations status post staples  Cholecystitis chronic status post cholecystostomy tube on 4/27  Pleurisy  End-stage renal disease on hemodialysis  History of gout  Type 2 diabetes  Neuropathy  Hypertension  Coronary disease  Acute on chronic blood loss anemia    Issues Requiring Follow-Up  See after visit summary for complete plan.    Discharge Meds     Your medication list        CHANGE how you take these medications        Instructions Last Dose Given Next Dose Due   cefdinir 300 mg capsule  Commonly known as: Omnicef  What changed: Another medication with the same name was added. Make sure you understand how and when to take each.      Take 1 capsule (300 mg) by mouth 2 times a day for 7 days.       cefdinir 300 mg capsule  Commonly known as: Omnicef  What changed: You were already taking a medication with the same name, and this prescription was added. Make sure you understand how and when to take each.      Take 1 capsule (300 mg) by mouth once daily for 7 days.              CONTINUE taking these medications        Instructions Last Dose Given Next Dose Due   allopurinol 100 mg tablet  Commonly known as: Zyloprim      Take 1 tablet (100 mg) by mouth once daily.       amLODIPine 5 mg tablet  Commonly known as: Norvasc           cholecalciferol 50 MCG (2000 UT) tablet  Commonly known as: Vitamin D-3           cyanocobalamin 1,000 mcg tablet  Commonly known as: Vitamin B-12           metoprolol tartrate 25 mg tablet  Commonly known as: Lopressor      Take 1 tablet (25 mg) by mouth 2 times a day.       pioglitazone 15 mg tablet  Commonly known as: Actos      Take 1 tablet (15 mg) by mouth once daily.       pravastatin 40 mg tablet  Commonly known as: Pravachol      Take 1 tablet (40 mg) by mouth once daily at bedtime.       pregabalin 100 mg capsule  Commonly known as: Lyrica      TAKE ONE CAPSULE BY MOUTH TWICE DAILY  @9AM & 5PM       sevelamer carbonate 800 mg tablet  Commonly known as: Renvela      Take 1 tablet (800 mg) by mouth 3 times a day with meals. Swallow tablet whole; do not crush, break, or chew.       SITagliptin phosphate 25 mg tablet  Commonly known as: Januvia      Take 1 tablet (25 mg) by mouth once daily.              ASK your doctor about these medications        Instructions Last Dose Given Next Dose Due   amoxicillin-pot clavulanate 875-125 mg tablet  Commonly known as: Augmentin  Ask about: Should I take this medication?      Take 1 tablet by mouth 2 times a day for 10 days.                 Where to Get Your Medications        These medications were sent to  [facility]  [address] [address]      Hours: 8AM to 6PM Mon-Fri, 8AM to 4PM Sat-Sun Phone: [phone]   cefdinir 300 mg capsule  cefdinir 300 mg capsule         Test Results Pending At Discharge  Pending Labs       Order Current Status    Sterile Fluid Culture/Smear Preliminary result            Hospital Course   Tana Hargrove is a 79 y.o. female on day 8 of admission presenting with Fall, initial encounter.           Subjective   79F hx CAD, HTN, COPD, ESRD on HD via permacath, DM2, MDS, gout, neuropathy presents for fall     Patient thinks that she slipped on the floor this afternoon and fell backwards because of that but unsure. thinks knees may have given out as well. felt well after her hospitalization w/o abd pain, n/v, generalized or focal weakness, f/c. has been eating ok. denies dizziness or syncope/loss of consciousness. fell back and hit her buttocks and head. head bled for awhile, now self-resolved w/o sutures. no pain in buttocks/back. no other injuries. not able to get up on own until got help 4h later. no recent cp, sob, diarrhea, dysuria, ha, blurry vision, numbness. taking asa.        4/23: Patient was seen and examined.  She is awake and alert and interactive today.  Multiple changes of bandage dressing  due to soaked with blood.  Being transfused with 1 unit of packed red blood cells and 1 unit coming up later today.  Will repeat H&H likely transfuse with 10 units.  Patient completed hemodialysis later today.  PT OT to evaluate for functional status.  Repeat H&H at 6 PM.  Repeat CBC and BMP.  4/24: Patient was seen and examined.  She had an episode of continued bleeding overnight and required more stable on the scalp in order to achieve hemostasis.  I will consult general/trauma surgery to evaluate scalp staples.  Trend CBC and transfuse with hemoglobin less than 7.  Seen by PT OT who recommend home with home health.  Potential discharge within the next 24 to 48 hours  4/25: Patient was seen and examined.  Developed right upper quadrant pain overnight after eating a hamburger for dinner.  Hemoglobin has remained stable at 7.6 this morning.  Seen by general surgery who has ordered a HIDA scan.  General surgery to consider cholecystectomy during this admission.  4/26: Patient was seen and examined.  HIDA scan yesterday showed signs of acute cholecystitis.  Hemoglobin still borderline at 7.1 but holding.  Was seen by cardiology and I spoke with cardiology who placed her at moderate to high risk of surgery however no further testing will impact this risk.  Possible cholecystectomy later today.  Consider transfusion of 1 more unit of packed red blood cells intraoperatively and within the next 24 hours.  Repeat CBC and BMP in a.m.  4/27: Patient is awaiting cholecystostomy tube today we will have nursing call surgery and also call down to specialist to see what the timing of her cholecystostomy tube is.  Clinically looks to be doing well otherwise but very tender in the right upper quadrant.  4/28: Patient's status post cholecystostomy tube.  Drainage appears relatively clear.  Continue with antibiotics advancing diet.  Continue with hemodialysis.  On discharge plan is for discharge home with cholecystostomy tube and  outpatient follow-up with [facility].  Increasing patient's activity.  4/29: Pleuritic pain is improving dose of colchicine again tomorrow will go home on Omnicef.  For 7 days.  4/30: Patient doing well plan to discharge home today.  See after visit summary for complete plan.     35 minutes spent in the care of this patient.    Pertinent Physical Exam At Time of Discharge  Physical Exam  See today's progress note for more physical exam findings  Outpatient Follow-Up  Future Appointments   Date Time Provider Department [place]   5/6/2024  3:15 PM Ronald Grimes MD [id number] [facility]   5/14/2024 10:00 AM Ronald Grimes MD [id number] [facility]   5/20/2024  9:00 AM Rosanne M Casal, APRN-CNP, DNP [id number] [facility]   6/26/2024  9:40 AM Carlos Higgins MD [id number] [facility]         Warren Head MD

## 2024-04-30 NOTE — PROGRESS NOTES
Occupational Therapy                 Therapy Communication Note    Patient Name: Tana Hargrove  MRN: [id number]  Today's Date: 4/30/2024     Discipline: Occupational Therapy    Missed Visit Reason: Missed Visit Reason: Patient refused (Attempted OT tx session at this time, however pt politely declined, stating she is discharging very soon.)    Missed Time: Attempt    Comment:

## 2024-05-01 ENCOUNTER — PATIENT OUTREACH (OUTPATIENT)
Dept: CARE COORDINATION | Facility: CLINIC | Age: 79
End: 2024-05-01
Payer: MEDICARE

## 2024-05-01 LAB
BACTERIA FLD CULT: ABNORMAL
GRAM STN SPEC: ABNORMAL
GRAM STN SPEC: ABNORMAL

## 2024-05-01 NOTE — PROGRESS NOTES
5/1/2024, 10:41, received call from [facility], spoke to Alley. States a nurse will be out to help pt today.

## 2024-05-01 NOTE — PROGRESS NOTES
Discharge Facility:[facility]  Discharge Diagnosis:Fall ESTEE  Admission Date:04/22/24  Discharge Date: 04/30/24    PCP Appointment Date:none available sent to pcp office  Specialist Appointment Date:   Hospital Encounter and Summary: Linked   See discharge assessment below for further details  Engagement  Call Start Time: 0900 (5/1/2024  8:59 AM)    Medications  Medications reviewed with patient/caregiver?: Yes (cefdinir 300mg) (5/1/2024  8:59 AM)  Is the patient having any side effects they believe may be caused by any medication additions or changes?: No (5/1/2024  8:59 AM)  Does the patient have all medications ordered at discharge?: Yes (5/1/2024  8:59 AM)  Is the patient taking all medications as directed (includes completed medication regime)?: Yes (5/1/2024  8:59 AM)    Appointments  Does the patient have a primary care provider?: Yes (5/1/2024  8:59 AM)  Care Management Interventions: Advised patient to make appointment (5/1/2024  8:59 AM)    Self Management  Has home health visited the patient within 72 hours of discharge?: No (will be calling today) (5/1/2024  8:59 AM)  Has all Durable Medical Equipment (DME) been delivered?: No (5/1/2024  8:59 AM)    Patient Teaching  Does the patient have access to their discharge instructions?: Yes (5/1/2024  8:59 AM)  Care Management Interventions: Reviewed instructions with patient (5/1/2024  8:59 AM)  What is the patient's perception of their health status since discharge?: Improving (5/1/2024  8:59 AM)    Wrap Up  Call End Time: 0910 (5/1/2024  8:59 AM)

## 2024-05-01 NOTE — NURSING NOTE
5/1/2024, 1025am. Pt called floor this morning expressing need for help with drain care. Pt forgot how to empty/flush drain. Attempt to educate patient over phone and was unsuccessful. Call was made to patients home care company, [facility], and was transferred to their nurse manager as the  stated they did not have a nurse going to the patients house today. Pt going to dialysis in [place] at 12pm and needs help with drain care. Message also sent to  for help regarding this situation.

## 2024-05-02 ENCOUNTER — PATIENT OUTREACH (OUTPATIENT)
Dept: CARE COORDINATION | Facility: CLINIC | Age: 79
End: 2024-05-02
Payer: MEDICARE

## 2024-05-02 NOTE — PROGRESS NOTES
Outreach to patient by CM to assess for needs. Spoke to patient. Patient discharged from  [facility] on 4/30/24. Patient states she is doing well. Patient voiced understanding of discharge medications, instructions and plan of care. Patient active with [facility] , patient states RN was to home today and is awaiting call from PT. Patient states she uses rollator, is independent with ADL's and has no issues with transportation to appointments. Patient states she goes to HD on Monday, Wednesday and Friday. Patient with no needs identified at this time. Patient given CM contact information. Patient appreciative on call. Will continue monitor patient.

## 2024-05-06 ENCOUNTER — APPOINTMENT (OUTPATIENT)
Dept: HEMATOLOGY/ONCOLOGY | Facility: CLINIC | Age: 79
End: 2024-05-06
Payer: MEDICARE

## 2024-05-06 ENCOUNTER — APPOINTMENT (OUTPATIENT)
Dept: SURGERY | Facility: CLINIC | Age: 79
End: 2024-05-06
Payer: MEDICARE

## 2024-05-07 ENCOUNTER — APPOINTMENT (OUTPATIENT)
Dept: SURGERY | Facility: CLINIC | Age: 79
End: 2024-05-07
Payer: MEDICARE

## 2024-05-10 ENCOUNTER — APPOINTMENT (OUTPATIENT)
Dept: PRIMARY CARE | Facility: CLINIC | Age: 79
End: 2024-05-10
Payer: MEDICARE

## 2024-05-13 ENCOUNTER — PATIENT OUTREACH (OUTPATIENT)
Dept: CARE COORDINATION | Facility: CLINIC | Age: 79
End: 2024-05-13

## 2024-05-13 NOTE — PROGRESS NOTES
Call regarding appt. with PCP after hospitalization.She stated that she hasn't seen her pcp at this time   At time of outreach call the patient feels as if their condition has improved since last visit.  Reviewed the PCP appointment with the pt and addressed any questions or concerns.

## 2024-05-14 ENCOUNTER — OFFICE VISIT (OUTPATIENT)
Dept: SURGERY | Facility: CLINIC | Age: 79
End: 2024-05-14
Payer: MEDICARE

## 2024-05-14 ENCOUNTER — LAB (OUTPATIENT)
Dept: LAB | Facility: LAB | Age: 79
End: 2024-05-14
Payer: MEDICARE

## 2024-05-14 ENCOUNTER — APPOINTMENT (OUTPATIENT)
Dept: SURGERY | Facility: CLINIC | Age: 79
End: 2024-05-14
Payer: MEDICARE

## 2024-05-14 VITALS
SYSTOLIC BLOOD PRESSURE: 178 MMHG | WEIGHT: 153.6 LBS | BODY MASS INDEX: 29 KG/M2 | DIASTOLIC BLOOD PRESSURE: 70 MMHG | HEART RATE: 67 BPM | HEIGHT: 61 IN | OXYGEN SATURATION: 92 %

## 2024-05-14 DIAGNOSIS — K81.1 CHRONIC CHOLECYSTITIS: Primary | ICD-10-CM

## 2024-05-14 DIAGNOSIS — D46.9 MYELODYSPLASTIC SYNDROME (MULTI): ICD-10-CM

## 2024-05-14 DIAGNOSIS — K81.1 CHRONIC CHOLECYSTITIS: ICD-10-CM

## 2024-05-14 LAB
ALBUMIN SERPL BCP-MCNC: 4 G/DL (ref 3.4–5)
ALP SERPL-CCNC: 90 U/L (ref 33–136)
ALT SERPL W P-5'-P-CCNC: 12 U/L (ref 7–45)
AMYLASE SERPL-CCNC: 46 U/L (ref 29–103)
AST SERPL W P-5'-P-CCNC: 14 U/L (ref 9–39)
BASOPHILS # BLD AUTO: 0.04 X10*3/UL (ref 0–0.1)
BASOPHILS NFR BLD AUTO: 0.8 %
BILIRUB DIRECT SERPL-MCNC: 0.1 MG/DL (ref 0–0.3)
BILIRUB SERPL-MCNC: 0.8 MG/DL (ref 0–1.2)
EOSINOPHIL # BLD AUTO: 0.21 X10*3/UL (ref 0–0.4)
EOSINOPHIL NFR BLD AUTO: 4.3 %
ERYTHROCYTE [DISTWIDTH] IN BLOOD BY AUTOMATED COUNT: 22.7 % (ref 11.5–14.5)
HCT VFR BLD AUTO: 26 % (ref 36–46)
HGB BLD-MCNC: 8.2 G/DL (ref 12–16)
IMM GRANULOCYTES # BLD AUTO: 0.02 X10*3/UL (ref 0–0.5)
IMM GRANULOCYTES NFR BLD AUTO: 0.4 % (ref 0–0.9)
LYMPHOCYTES # BLD AUTO: 1.29 X10*3/UL (ref 0.8–3)
LYMPHOCYTES NFR BLD AUTO: 26.5 %
MCH RBC QN AUTO: 30.4 PG (ref 26–34)
MCHC RBC AUTO-ENTMCNC: 31.5 G/DL (ref 32–36)
MCV RBC AUTO: 96 FL (ref 80–100)
MONOCYTES # BLD AUTO: 0.52 X10*3/UL (ref 0.05–0.8)
MONOCYTES NFR BLD AUTO: 10.7 %
NEUTROPHILS # BLD AUTO: 2.78 X10*3/UL (ref 1.6–5.5)
NEUTROPHILS NFR BLD AUTO: 57.3 %
NRBC BLD-RTO: 0 /100 WBCS (ref 0–0)
OVALOCYTES BLD QL SMEAR: NORMAL
PLATELET # BLD AUTO: 222 X10*3/UL (ref 150–450)
PROT SERPL-MCNC: 6.4 G/DL (ref 6.4–8.2)
RBC # BLD AUTO: 2.7 X10*6/UL (ref 4–5.2)
RBC MORPH BLD: NORMAL
WBC # BLD AUTO: 4.9 X10*3/UL (ref 4.4–11.3)

## 2024-05-14 PROCEDURE — 1036F TOBACCO NON-USER: CPT | Performed by: SURGERY

## 2024-05-14 PROCEDURE — 36415 COLL VENOUS BLD VENIPUNCTURE: CPT

## 2024-05-14 PROCEDURE — 85025 COMPLETE CBC W/AUTO DIFF WBC: CPT

## 2024-05-14 PROCEDURE — 1111F DSCHRG MED/CURRENT MED MERGE: CPT | Performed by: SURGERY

## 2024-05-14 PROCEDURE — 82150 ASSAY OF AMYLASE: CPT

## 2024-05-14 PROCEDURE — 99213 OFFICE O/P EST LOW 20 MIN: CPT | Performed by: SURGERY

## 2024-05-14 PROCEDURE — 1157F ADVNC CARE PLAN IN RCRD: CPT | Performed by: SURGERY

## 2024-05-14 PROCEDURE — 80076 HEPATIC FUNCTION PANEL: CPT

## 2024-05-14 PROCEDURE — 3077F SYST BP >= 140 MM HG: CPT | Performed by: SURGERY

## 2024-05-14 PROCEDURE — 3078F DIAST BP <80 MM HG: CPT | Performed by: SURGERY

## 2024-05-14 PROCEDURE — 1159F MED LIST DOCD IN RCRD: CPT | Performed by: SURGERY

## 2024-05-14 PROCEDURE — 1160F RVW MEDS BY RX/DR IN RCRD: CPT | Performed by: SURGERY

## 2024-05-14 NOTE — PROGRESS NOTES
"Subjective   Patient ID: Tana Hargrove is a 79 y.o. female who presents for Follow-up (Patient is here for a 2 week post of Diagnostic Laparoscopy, lysis of adhesions. Patient denies pain or discomfort.).    HPI   underwent a percutaneous cholecystostomy tube placement for chronic cholecystitis performed on 27th April 2024. Prior to that she was taken to the operating room for an attempted lap chidi which was aborted for acutely inflamed gallbladder and liver adhesions.  She is here for her first appointment after being discharged from the hospital.  She reports reduced PO intake secondary to poor appetite; denies issues with bowel movements nor micturition.  She does report pain in and around where the drain exits the skin.  She empties the bag a couple of times a day and reports bilious to clear fluid.  She denies fevers/chills.        Review of Systems  Constitutional: +unintentional weight loss, no fatigue, night-sweats  HEENT: No changes in vision, nasal drainage, headache  CV: No palpitations, no chest pain, no lower extremity oedema  Resp: No wheezing, no cough, no shortness of breath  GI: No nausea, vomiting, diarrhoea, constipation  : No dysuria, no increased frequency  Neuro: No weakness, confusion, numbness, dizziness  MSK: No weakness, arthralgias, myalgias  Haem: No easy bruising, no easy bleeding  Skin: No new lesions or rashes  Endocrine: No polydipsia, polyuria, heat/cold insensitivity      Objective   /70   Pulse 67   Ht 1.549 m (5' 1\")   Wt 69.7 kg (153 lb 9.6 oz)   SpO2 92%   BMI 29.02 kg/m²     Physical Exam  Vitals reviewed.   Abdominal:      Palpations: Abdomen is soft.      Comments: Chidi drain exiting RUQ abdomen with clear fluid in tubing and in bag  NT, ND, no guarding, no rebound tenderness   Musculoskeletal:         General: Normal range of motion.   Skin:     General: Skin is warm.      Capillary Refill: Capillary refill takes less than 2 seconds.   Neurological: "      General: No focal deficit present.      Mental Status: She is alert.   Psychiatric:         Mood and Affect: Mood normal.         Assessment/Plan   79F with chronic cholecystitis managed with percutaneous cholecystostomy tube and aborted lap chidi procedure   - will schedule drain study; if cystic duct open, will have IR remove drain; if not, the drain will stay  - labs today  Depending on above, will plan appropriately for lap, possible open cholecystectomy    Problem List Items Addressed This Visit             ICD-10-CM    Chronic cholecystitis - Primary K81.1    Relevant Orders    CBC    Hepatic function panel (Completed)    Amylase (Completed)    IR biliary cholangiogram            Ronald Grimes MD 05/14/24 10:02 AM

## 2024-05-19 ENCOUNTER — APPOINTMENT (OUTPATIENT)
Dept: RADIOLOGY | Facility: HOSPITAL | Age: 79
DRG: 871 | End: 2024-05-19
Payer: MEDICARE

## 2024-05-19 ENCOUNTER — HOSPITAL ENCOUNTER (INPATIENT)
Facility: HOSPITAL | Age: 79
LOS: 8 days | Discharge: NURSING, REHAB OR LONG-TERM CARE | DRG: 871 | End: 2024-05-28
Attending: EMERGENCY MEDICINE | Admitting: STUDENT IN AN ORGANIZED HEALTH CARE EDUCATION/TRAINING PROGRAM
Payer: MEDICARE

## 2024-05-19 ENCOUNTER — APPOINTMENT (OUTPATIENT)
Dept: CARDIOLOGY | Facility: HOSPITAL | Age: 79
DRG: 871 | End: 2024-05-19
Payer: MEDICARE

## 2024-05-19 DIAGNOSIS — M79.18 MYOFASCIAL PAIN SYNDROME: ICD-10-CM
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
## 2024-05-19 DIAGNOSIS — K81.1 CHRONIC CHOLECYSTITIS: ICD-10-CM

## 2024-05-19 DIAGNOSIS — N18.4 STAGE 4 CHRONIC KIDNEY DISEASE (MULTI): Primary | Chronic | ICD-10-CM

## 2024-05-19 DIAGNOSIS — I10 HYPERTENSION, ESSENTIAL: Chronic | ICD-10-CM

## 2024-05-19 DIAGNOSIS — D46.9 MYELODYSPLASTIC SYNDROME (MULTI): ICD-10-CM

## 2024-05-19 DIAGNOSIS — I48.91 ATRIAL FIBRILLATION, UNSPECIFIED TYPE (MULTI): ICD-10-CM

## 2024-05-19 DIAGNOSIS — R79.89 ELEVATED LFTS: ICD-10-CM

## 2024-05-19 PROBLEM — R74.01 TRANSAMINITIS: Status: ACTIVE | Noted: 2024-05-19

## 2024-05-19 PROBLEM — R74.8 ELEVATED LIPASE: Status: ACTIVE | Noted: 2024-05-19

## 2024-05-19 PROBLEM — R73.9 HYPERGLYCEMIA: Status: ACTIVE | Noted: 2024-05-19

## 2024-05-19 PROBLEM — R17 ELEVATED BILIRUBIN: Status: ACTIVE | Noted: 2024-05-19

## 2024-05-19 LAB
ALBUMIN SERPL BCP-MCNC: 3.9 G/DL (ref 3.4–5)
ALP SERPL-CCNC: 292 U/L (ref 33–136)
ALT SERPL W P-5'-P-CCNC: 296 U/L (ref 7–45)
ANION GAP SERPL CALC-SCNC: 13 MMOL/L (ref 10–20)
AST SERPL W P-5'-P-CCNC: 278 U/L (ref 9–39)
BASOPHILS # BLD MANUAL: 0 X10*3/UL (ref 0–0.1)
BASOPHILS NFR BLD MANUAL: 0 %
BILIRUB SERPL-MCNC: 4.7 MG/DL (ref 0–1.2)
BUN SERPL-MCNC: 33 MG/DL (ref 6–23)
CALCIUM SERPL-MCNC: 10 MG/DL (ref 8.6–10.3)
CARDIAC TROPONIN I PNL SERPL HS: 62 NG/L (ref 0–13)
CARDIAC TROPONIN I PNL SERPL HS: 63 NG/L (ref 0–13)
CARDIAC TROPONIN I PNL SERPL HS: 92 NG/L (ref 0–13)
CARDIAC TROPONIN I PNL SERPL HS: 98 NG/L (ref 0–13)
CHLORIDE SERPL-SCNC: 101 MMOL/L (ref 98–107)
CK SERPL-CCNC: 172 U/L (ref 0–215)
CO2 SERPL-SCNC: 27 MMOL/L (ref 21–32)
CREAT SERPL-MCNC: 2.38 MG/DL (ref 0.5–1.05)
DACRYOCYTES BLD QL SMEAR: ABNORMAL
EGFRCR SERPLBLD CKD-EPI 2021: 20 ML/MIN/1.73M*2
EOSINOPHIL # BLD MANUAL: 0 X10*3/UL (ref 0–0.4)
EOSINOPHIL NFR BLD MANUAL: 0 %
ERYTHROCYTE [DISTWIDTH] IN BLOOD BY AUTOMATED COUNT: 22.5 % (ref 11.5–14.5)
GLUCOSE BLD MANUAL STRIP-MCNC: 124 MG/DL (ref 74–99)
GLUCOSE SERPL-MCNC: 165 MG/DL (ref 74–99)
HCT VFR BLD AUTO: 22.9 % (ref 36–46)
HGB BLD-MCNC: 7.5 G/DL (ref 12–16)
HOLD SPECIMEN: NORMAL
HYPOCHROMIA BLD QL SMEAR: ABNORMAL
IMM GRANULOCYTES # BLD AUTO: 0.06 X10*3/UL (ref 0–0.5)
IMM GRANULOCYTES NFR BLD AUTO: 0.6 % (ref 0–0.9)
LACTATE SERPL-SCNC: 1.8 MMOL/L (ref 0.4–2)
LIPASE SERPL-CCNC: 146 U/L (ref 9–82)
LYMPHOCYTES # BLD MANUAL: 0.48 X10*3/UL (ref 0.8–3)
LYMPHOCYTES NFR BLD MANUAL: 5 %
MCH RBC QN AUTO: 30.6 PG (ref 26–34)
MCHC RBC AUTO-ENTMCNC: 32.8 G/DL (ref 32–36)
MCV RBC AUTO: 94 FL (ref 80–100)
MONOCYTES # BLD MANUAL: 0.38 X10*3/UL (ref 0.05–0.8)
MONOCYTES NFR BLD MANUAL: 4 %
NEUTROPHILS # BLD MANUAL: 8.74 X10*3/UL (ref 1.6–5.5)
NEUTS BAND # BLD MANUAL: 1.92 X10*3/UL (ref 0–0.5)
NEUTS BAND NFR BLD MANUAL: 20 %
NEUTS SEG # BLD MANUAL: 6.82 X10*3/UL (ref 1.6–5)
NEUTS SEG NFR BLD MANUAL: 71 %
NRBC BLD-RTO: 0 /100 WBCS (ref 0–0)
OVALOCYTES BLD QL SMEAR: ABNORMAL
PLATELET # BLD AUTO: 210 X10*3/UL (ref 150–450)
POTASSIUM SERPL-SCNC: 3.9 MMOL/L (ref 3.5–5.3)
PROT SERPL-MCNC: 6.4 G/DL (ref 6.4–8.2)
RBC # BLD AUTO: 2.45 X10*6/UL (ref 4–5.2)
RBC MORPH BLD: ABNORMAL
SODIUM SERPL-SCNC: 137 MMOL/L (ref 136–145)
TARGETS BLD QL SMEAR: ABNORMAL
TOTAL CELLS COUNTED BLD: 100
WBC # BLD AUTO: 9.6 X10*3/UL (ref 4.4–11.3)

## 2024-05-19 PROCEDURE — 36415 COLL VENOUS BLD VENIPUNCTURE: CPT | Performed by: PHYSICIAN ASSISTANT

## 2024-05-19 PROCEDURE — 84484 ASSAY OF TROPONIN QUANT: CPT | Performed by: PHYSICIAN ASSISTANT

## 2024-05-19 PROCEDURE — 76705 ECHO EXAM OF ABDOMEN: CPT

## 2024-05-19 PROCEDURE — 83690 ASSAY OF LIPASE: CPT | Performed by: PHYSICIAN ASSISTANT

## 2024-05-19 PROCEDURE — 93005 ELECTROCARDIOGRAM TRACING: CPT

## 2024-05-19 PROCEDURE — 82947 ASSAY GLUCOSE BLOOD QUANT: CPT

## 2024-05-19 PROCEDURE — 96372 THER/PROPH/DIAG INJ SC/IM: CPT | Performed by: STUDENT IN AN ORGANIZED HEALTH CARE EDUCATION/TRAINING PROGRAM

## 2024-05-19 PROCEDURE — 85027 COMPLETE CBC AUTOMATED: CPT | Performed by: PHYSICIAN ASSISTANT

## 2024-05-19 PROCEDURE — 85007 BL SMEAR W/DIFF WBC COUNT: CPT | Performed by: PHYSICIAN ASSISTANT

## 2024-05-19 PROCEDURE — 84484 ASSAY OF TROPONIN QUANT: CPT | Performed by: EMERGENCY MEDICINE

## 2024-05-19 PROCEDURE — 83605 ASSAY OF LACTIC ACID: CPT | Performed by: PHYSICIAN ASSISTANT

## 2024-05-19 PROCEDURE — 82550 ASSAY OF CK (CPK): CPT | Performed by: PHYSICIAN ASSISTANT

## 2024-05-19 PROCEDURE — 74176 CT ABD & PELVIS W/O CONTRAST: CPT | Mod: FOREIGN READ | Performed by: RADIOLOGY

## 2024-05-19 PROCEDURE — 84075 ASSAY ALKALINE PHOSPHATASE: CPT | Performed by: PHYSICIAN ASSISTANT

## 2024-05-19 PROCEDURE — 2500000001 HC RX 250 WO HCPCS SELF ADMINISTERED DRUGS (ALT 637 FOR MEDICARE OP): Performed by: STUDENT IN AN ORGANIZED HEALTH CARE EDUCATION/TRAINING PROGRAM

## 2024-05-19 PROCEDURE — 74176 CT ABD & PELVIS W/O CONTRAST: CPT

## 2024-05-19 PROCEDURE — 99285 EMERGENCY DEPT VISIT HI MDM: CPT | Mod: 25

## 2024-05-19 PROCEDURE — 99223 1ST HOSP IP/OBS HIGH 75: CPT | Performed by: STUDENT IN AN ORGANIZED HEALTH CARE EDUCATION/TRAINING PROGRAM

## 2024-05-19 PROCEDURE — 84484 ASSAY OF TROPONIN QUANT: CPT | Mod: 91 | Performed by: STUDENT IN AN ORGANIZED HEALTH CARE EDUCATION/TRAINING PROGRAM

## 2024-05-19 PROCEDURE — 76705 ECHO EXAM OF ABDOMEN: CPT | Mod: FOREIGN READ | Performed by: RADIOLOGY

## 2024-05-19 PROCEDURE — 72170 X-RAY EXAM OF PELVIS: CPT | Mod: FOREIGN READ | Performed by: RADIOLOGY

## 2024-05-19 PROCEDURE — 71045 X-RAY EXAM CHEST 1 VIEW: CPT | Mod: FOREIGN READ | Performed by: RADIOLOGY

## 2024-05-19 PROCEDURE — 71045 X-RAY EXAM CHEST 1 VIEW: CPT

## 2024-05-19 PROCEDURE — 72170 X-RAY EXAM OF PELVIS: CPT

## 2024-05-19 PROCEDURE — 2500000004 HC RX 250 GENERAL PHARMACY W/ HCPCS (ALT 636 FOR OP/ED): Performed by: STUDENT IN AN ORGANIZED HEALTH CARE EDUCATION/TRAINING PROGRAM

## 2024-05-19 RX ORDER — PREGABALIN 25 MG/1
100 CAPSULE ORAL 2 TIMES DAILY
Status: DISCONTINUED | OUTPATIENT
Start: 2024-05-19 | End: 2024-05-28 | Stop reason: HOSPADM

## 2024-05-19 RX ORDER — EPINEPHRINE 0.3 MG/.3ML
0.3 INJECTION SUBCUTANEOUS EVERY 5 MIN PRN
OUTPATIENT
Start: 2024-05-20

## 2024-05-19 RX ORDER — AMLODIPINE BESYLATE 5 MG/1
5 TABLET ORAL DAILY
Status: DISCONTINUED | OUTPATIENT
Start: 2024-05-19 | End: 2024-05-24

## 2024-05-19 RX ORDER — HEPARIN SODIUM 5000 [USP'U]/ML
5000 INJECTION, SOLUTION INTRAVENOUS; SUBCUTANEOUS EVERY 8 HOURS SCHEDULED
Status: DISCONTINUED | OUTPATIENT
Start: 2024-05-19 | End: 2024-05-28 | Stop reason: HOSPADM

## 2024-05-19 RX ORDER — INSULIN LISPRO 100 [IU]/ML
0-5 INJECTION, SOLUTION INTRAVENOUS; SUBCUTANEOUS
Status: DISCONTINUED | OUTPATIENT
Start: 2024-05-20 | End: 2024-05-28 | Stop reason: HOSPADM

## 2024-05-19 RX ORDER — BISACODYL 5 MG
10 TABLET, DELAYED RELEASE (ENTERIC COATED) ORAL DAILY PRN
Status: DISCONTINUED | OUTPATIENT
Start: 2024-05-19 | End: 2024-05-28 | Stop reason: HOSPADM

## 2024-05-19 RX ORDER — PANTOPRAZOLE SODIUM 40 MG/10ML
40 INJECTION, POWDER, LYOPHILIZED, FOR SOLUTION INTRAVENOUS
Status: DISCONTINUED | OUTPATIENT
Start: 2024-05-20 | End: 2024-05-28 | Stop reason: HOSPADM

## 2024-05-19 RX ORDER — TALC
3 POWDER (GRAM) TOPICAL NIGHTLY PRN
Status: DISCONTINUED | OUTPATIENT
Start: 2024-05-19 | End: 2024-05-28 | Stop reason: HOSPADM

## 2024-05-19 RX ORDER — METOPROLOL TARTRATE 25 MG/1
25 TABLET, FILM COATED ORAL 2 TIMES DAILY
Status: DISCONTINUED | OUTPATIENT
Start: 2024-05-19 | End: 2024-05-24

## 2024-05-19 RX ORDER — FAMOTIDINE 10 MG/ML
20 INJECTION INTRAVENOUS ONCE AS NEEDED
OUTPATIENT
Start: 2024-05-20

## 2024-05-19 RX ORDER — DEXTROSE 50 % IN WATER (D50W) INTRAVENOUS SYRINGE
25
Status: DISCONTINUED | OUTPATIENT
Start: 2024-05-19 | End: 2024-05-28 | Stop reason: HOSPADM

## 2024-05-19 RX ORDER — ONDANSETRON HYDROCHLORIDE 2 MG/ML
4 INJECTION, SOLUTION INTRAVENOUS EVERY 8 HOURS PRN
Status: DISCONTINUED | OUTPATIENT
Start: 2024-05-19 | End: 2024-05-28 | Stop reason: HOSPADM

## 2024-05-19 RX ORDER — DIPHENHYDRAMINE HYDROCHLORIDE 50 MG/ML
50 INJECTION INTRAMUSCULAR; INTRAVENOUS AS NEEDED
OUTPATIENT
Start: 2024-05-20

## 2024-05-19 RX ORDER — ALBUTEROL SULFATE 0.83 MG/ML
3 SOLUTION RESPIRATORY (INHALATION) AS NEEDED
OUTPATIENT
Start: 2024-05-20

## 2024-05-19 RX ORDER — ONDANSETRON 4 MG/1
4 TABLET, FILM COATED ORAL EVERY 8 HOURS PRN
Status: DISCONTINUED | OUTPATIENT
Start: 2024-05-19 | End: 2024-05-28 | Stop reason: HOSPADM

## 2024-05-19 RX ORDER — GUAIFENESIN 600 MG/1
600 TABLET, EXTENDED RELEASE ORAL EVERY 12 HOURS PRN
Status: DISCONTINUED | OUTPATIENT
Start: 2024-05-19 | End: 2024-05-28 | Stop reason: HOSPADM

## 2024-05-19 RX ORDER — PANTOPRAZOLE SODIUM 40 MG/1
40 TABLET, DELAYED RELEASE ORAL
Status: DISCONTINUED | OUTPATIENT
Start: 2024-05-20 | End: 2024-05-28 | Stop reason: HOSPADM

## 2024-05-19 RX ORDER — PRAVASTATIN SODIUM 40 MG/1
40 TABLET ORAL NIGHTLY
Status: DISCONTINUED | OUTPATIENT
Start: 2024-05-19 | End: 2024-05-28 | Stop reason: HOSPADM

## 2024-05-19 RX ORDER — DEXTROSE 50 % IN WATER (D50W) INTRAVENOUS SYRINGE
12.5
Status: DISCONTINUED | OUTPATIENT
Start: 2024-05-19 | End: 2024-05-28 | Stop reason: HOSPADM

## 2024-05-19 RX ADMIN — METOPROLOL TARTRATE 25 MG: 25 TABLET, FILM COATED ORAL at 21:11

## 2024-05-19 RX ADMIN — PRAVASTATIN SODIUM 40 MG: 40 TABLET ORAL at 21:10

## 2024-05-19 RX ADMIN — HEPARIN SODIUM 5000 UNITS: 5000 INJECTION INTRAVENOUS; SUBCUTANEOUS at 21:11

## 2024-05-19 RX ADMIN — PREGABALIN 100 MG: 25 CAPSULE ORAL at 21:12

## 2024-05-19 RX ADMIN — AMLODIPINE BESYLATE 5 MG: 5 TABLET ORAL at 21:10

## 2024-05-19 ASSESSMENT — ENCOUNTER SYMPTOMS
STRIDOR: 0
FREQUENCY: 0
VOMITING: 0
HEADACHES: 0
DIARRHEA: 0
ACTIVITY CHANGE: 0
COLOR CHANGE: 0
ABDOMINAL DISTENTION: 0
NERVOUS/ANXIOUS: 0
COUGH: 0
DECREASED CONCENTRATION: 0
SHORTNESS OF BREATH: 0
SORE THROAT: 0
FLANK PAIN: 0
APPETITE CHANGE: 0
JOINT SWELLING: 0
AGITATION: 0
WHEEZING: 0
CONSTIPATION: 0
WEAKNESS: 0
CHEST TIGHTNESS: 0
HEMATURIA: 0
DYSURIA: 0
ARTHRALGIAS: 0
NUMBNESS: 0
SINUS PAIN: 0
CHILLS: 0
DIFFICULTY URINATING: 0
FATIGUE: 0
APNEA: 0
DIZZINESS: 0
LIGHT-HEADEDNESS: 0
ABDOMINAL PAIN: 0
DIAPHORESIS: 0
PALPITATIONS: 0
FEVER: 0
NAUSEA: 0
MYALGIAS: 0
RHINORRHEA: 0
WOUND: 0
BACK PAIN: 0
CONFUSION: 0

## 2024-05-19 ASSESSMENT — PAIN SCALES - GENERAL
PAINLEVEL_OUTOF10: 0 - NO PAIN
PAINLEVEL_OUTOF10: 0 - NO PAIN

## 2024-05-19 ASSESSMENT — PAIN - FUNCTIONAL ASSESSMENT: PAIN_FUNCTIONAL_ASSESSMENT: 0-10

## 2024-05-19 NOTE — ED PROVIDER NOTES
EMERGENCY MEDICINE EVALUATION NOTE    History of Present Illness     Chief Complaint:   Chief Complaint   Patient presents with    Fall     Slipped and fell out of bed last night. - hit head. - LOC. - injuries. Has been laying on floor since last night around 9PM       HPI: Tana Hargrove is a 79 y.o. female presents with a chief complaint of fall.  Patient reports that she was getting out of bed last night and she slipped getting out falling down towards her buttock.  She is adamant she did not hit her head did not lose consciousness.  Patient denies any injuries.  She does report that she lives at home alone though when she was laying on the floor since last night around 9 PM.  Patient denies any other symptoms.  She states she was just too weak to get up on her own.  Patient reports that she is a dialysis patient that goes Monday Wednesday Friday with her last dialysis treatment being Friday.  She also reports she recently had her gallbladder removed with a drain present in the right upper quadrant.  She came in to be evaluated due to being on the floor for such a prolonged period of time.    Previous History     Past Medical History:   Diagnosis Date    Abnormal levels of other serum enzymes     Elevated liver enzymes    Acute kidney failure (CMS-HCC)     Anemia     CKD (chronic kidney disease)     COPD (chronic obstructive pulmonary disease) (Multi)     Coronary artery disease     Disease of blood and blood-forming organs, unspecified     Bone marrow disorder    HLD (hyperlipidemia)     Hypertension     MDS (myelodysplastic syndrome) (Multi)     Personal history of other diseases of the musculoskeletal system and connective tissue     History of muscle pain    Personal history of other specified conditions     History of insomnia    Personal history of other specified conditions     History of edema    Type 2 diabetes mellitus (Multi)      Past Surgical History:   Procedure Laterality Date    APPENDECTOMY       "BREAST BIOPSY Left     left excisional    BREAST LUMPECTOMY      COLONOSCOPY      HYSTERECTOMY      JOINT REPLACEMENT      MR HEAD ANGIO WO IV CONTRAST  11/20/2020    MR HEAD ANGIO WO IV CONTRAST 11/20/2020 [facility]    MR NECK ANGIO WO IV CONTRAST  11/20/2020    MR NECK ANGIO WO IV CONTRAST 11/20/2020 [facility] CL[facility]ICAL [facility]    OOPHORECTOMY      OTHER SURGICAL HISTORY  12/13/2019    Oophorectomy bilateral    OTHER SURGICAL HISTORY  12/13/2019    Tubal ligation    OTHER SURGICAL HISTORY Bilateral 12/13/2019    Knee replacement    OTHER SURGICAL HISTORY Left 12/13/2019    Shoulder surgery    OTHER SURGICAL HISTORY  12/13/2019    Hysterectomy    OTHER SURGICAL HISTORY  12/13/2019    Lumpectomy    OTHER SURGICAL HISTORY Right 12/13/2019    Foot surgery    OTHER SURGICAL HISTORY  04/16/2021    Back surgery     Social History     Tobacco Use    Smoking status: Never    Smokeless tobacco: Never   Vaping Use    Vaping status: Never Used   Substance Use Topics    Alcohol use: Not Currently    Drug use: Never     No family history on file.  Allergies   Allergen Reactions    Codeine Unknown     \"Feel like I'm floating\"    Could not speak did not feel good    lightheaded    Hydrocodone Other    Hydrocodone-Acetaminophen Unknown     \"I went into shock\" \" States she didn't know where she was after taking 1 dose.    Other reaction(s): Other (See Comments), Other: See Comments, shock   Went into shock   \"I went into shock\" \" States she didn't know where she was after taking 1 dose.    Went into shock    Meperidine (Pf) Unknown    Tramadol Other, Unknown and Itching    Piperacillin-Tazobactam Diarrhea and Headache    Adhesive Tape-Silicones Other    Meperidine Unknown and Nausea And Vomiting     Could talk did not feel well     Current Outpatient Medications   Medication Instructions    allopurinol (ZYLOPRIM) 100 mg, oral, Daily    amLODIPine (Norvasc) 5 mg tablet 1 tablet, oral, Daily    cholecalciferol (Vitamin D-3) 50 " MCG (2000 UT) tablet 1 tablet, oral, Daily    cyanocobalamin (Vitamin B-12) 1,000 mcg tablet 1 tablet, oral, Daily    metoprolol tartrate (LOPRESSOR) 25 mg, oral, 2 times daily    pioglitazone (ACTOS) 15 mg, oral, Daily    pravastatin (PRAVACHOL) 40 mg, oral, Nightly    pregabalin (Lyrica) 100 mg capsule TAKE ONE CAPSULE BY MOUTH TWICE DAILY @9AM & 5PM    sevelamer carbonate (RENVELA) 800 mg, oral, 3 times daily (morning, midday, late afternoon), Swallow tablet whole; do not crush, break, or chew.    SITagliptin phosphate (JANUVIA) 25 mg, oral, Daily       Physical Exam     Appearance: Alert, oriented , cooperative,  in no acute distress.      Skin: Intact,  dry skin, no lesions, rash, petechiae or purpura.      Eyes: PERRLA, EOMs intact,  Conjunctiva pink      ENT: Hearing grossly intact. Pharynx clear     Neck: Supple. Trachea at midline.      Pulmonary: Clear bilaterally. No rales, rhonchi or wheezing. No accessory muscle use or stridor.     Cardiac: Normal rate and rhythm without murmur.  Dialysis access right upper chest.     Abdomen: Soft, nontender, active bowel sounds.  Drain present in right upper quadrant.     Musculoskeletal: Full range of motion.  Pelvis stable.  No midline C-spine tenderness.  Able to lift both legs without difficulty.     Neurological:Cranial nerves II through XII are grossly intact, normal sensation, no weakness, no focal findings identified.     Results     Labs Reviewed   CBC WITH AUTO DIFFERENTIAL - Abnormal       Result Value    WBC 9.6      nRBC 0.0      RBC 2.45 (*)     Hemoglobin 7.5 (*)     Hematocrit 22.9 (*)     MCV 94      MCH 30.6      MCHC 32.8      RDW 22.5 (*)     Platelets 210      Immature Granulocytes %, Automated 0.6      Immature Granulocytes Absolute, Automated 0.06      Narrative:     The previously reported component Neutrophils % is no longer being reported.  The previously reported component Lymphocytes % is no longer being reported.  The previously reported  component Monocytes % is no longer being reported.  The previously   reported component Eosinophils % is no longer being reported.  The previously reported component Basophils % is no longer being reported.  The previously reported component Absolute Neutrophils is no longer being reported.  The previously reported   component Absolute Lymphocytes is no longer being reported.  The previously reported component Absolute Monocytes is no longer being reported.  The previously reported component Absolute Eosinophils is no longer being reported.  The previously reported   component Absolute Basophils is no longer being reported.   COMPREHENSIVE METABOLIC PANEL - Abnormal    Glucose 165 (*)     Sodium 137      Potassium 3.9      Chloride 101      Bicarbonate 27      Anion Gap 13      Urea Nitrogen 33 (*)     Creatinine 2.38 (*)     eGFR 20 (*)     Calcium 10.0      Albumin 3.9      Alkaline Phosphatase 292 (*)     Total Protein 6.4       (*)     Bilirubin, Total 4.7 (*)      (*)    LIPASE - Abnormal    Lipase 146 (*)     Narrative:     Venipuncture immediately after or during the administration of Metamizole may lead to falsely low results. Testing should be performed immediately prior to Metamizole dosing.   SERIAL TROPONIN-INITIAL - Abnormal    Troponin I, High Sensitivity 62 (*)     Narrative:     Less than 99th percentile of normal range cutoff-  Female and children under 18 years old <14 ng/L; Male <21 ng/L: Negative  Repeat testing should be performed if clinically indicated.     Female and children under 18 years old 14-50 ng/L; Male 21-50 ng/L:  Consistent with possible cardiac damage and possible increased clinical   risk. Serial measurements may help to assess extent of myocardial damage.     >50 ng/L: Consistent with cardiac damage, increased clinical risk and  myocardial infarction. Serial measurements may help assess extent of   myocardial damage.      NOTE: Children less than 1 year old may  have higher baseline troponin   levels and results should be interpreted in conjunction with the overall   clinical context.     NOTE: Troponin I testing is performed using a different   testing methodology at [facility] than at other   system hospitals. Direct result comparisons should only   be made within the same method.   SERIAL TROPONIN, 1 HOUR - Abnormal    Troponin I, High Sensitivity 63 (*)     Narrative:     Less than 99th percentile of normal range cutoff-  Female and children under 18 years old <14 ng/L; Male <21 ng/L: Negative  Repeat testing should be performed if clinically indicated.     Female and children under 18 years old 14-50 ng/L; Male 21-50 ng/L:  Consistent with possible cardiac damage and possible increased clinical   risk. Serial measurements may help to assess extent of myocardial damage.     >50 ng/L: Consistent with cardiac damage, increased clinical risk and  myocardial infarction. Serial measurements may help assess extent of   myocardial damage.      NOTE: Children less than 1 year old may have higher baseline troponin   levels and results should be interpreted in conjunction with the overall   clinical context.     NOTE: Troponin I testing is performed using a different   testing methodology at [facility] than at other   [facility] hospitals. Direct result comparisons should only   be made within the same method.   MANUAL DIFFERENTIAL - Abnormal    Neutrophils %, Manual 71.0      Bands %, Manual 20.0      Lymphocytes %, Manual 5.0      Monocytes %, Manual 4.0      Eosinophils %, Manual 0.0      Basophils %, Manual 0.0      Seg Neutrophils Absolute, Manual 6.82 (*)     Bands Absolute, Manual 1.92 (*)     Lymphocytes Absolute, Manual 0.48 (*)     Monocytes Absolute, Manual 0.38      Eosinophils Absolute, Manual 0.00      Basophils Absolute, Manual 0.00      Total Cells Counted 100      Neutrophils Absolute, Manual 8.74 (*)     RBC Morphology See Below       "Hypochromia Mild      Target Cells Few      Ovalocytes Few      Teardrop Cells Few     LACTATE - Normal    Lactate 1.8      Narrative:     Venipuncture immediately after or during the administration of Metamizole may lead to falsely low results. Testing should be performed immediately  prior to Metamizole dosing.   CREATINE KINASE - Normal    Creatine Kinase 172     TROPONIN SERIES- (INITIAL, 1 HR)    Narrative:     The following orders were created for panel order Troponin I Series, High Sensitivity (0, 1 HR).  Procedure                               Abnormality         Status                     ---------                               -----------         ------                     Troponin I, High Sensiti...[[id number]]  Abnormal            Final result               Troponin, High Sensitivi...[[id number]]  Abnormal            Final result                 Please view results for these tests on the individual orders.     CT abdomen pelvis wo IV contrast   Final Result   Percutaneous drainage tube noted within the maria isabel hepatis. This   appears to be related to a decompressed gallbladder versus positioning   status post cholecystectomy with residual fluid within the gallbladder   fossa. Clinical correlation for operative history recommended.   Signed by Abhi Godoy II, MD      XR chest 1 view   Final Result   No acute process.   Signed by Lonnie Rodriguez MD      XR pelvis 1-2 views   Final Result   No acute osseous abnormality.   Signed by Lonnie Rodriguez MD       gallbladder    (Results Pending)         ED Course & Medical Decision Making   Medications - No data to display  Heart Rate:  [92-99]   Temperature:  [38 °C (100.4 °F)]   Respirations:  [20-25]   BP: (145-190)/(4-87)   Height:  [154.9 cm (5' 1\")]   Weight:  [69.9 kg (154 lb)]   Pulse Ox:  [93 %-96 %]    ED Course as of 05/19/24 1601   Sun May 19, 2024   1219 EKG performed 5/19/2024 at 11:16 AM.  Sinus rhythm with a ventricular rate of 90 bpm, parable 164 " ms, QT/QTc of 397/507.  No STEMI. [CJ]   1248 CT abdomen pelvis to be added onto the patient due to the patient's abnormal lab findings.  Patient cannot receive contrast due to kidney function. [CJ]   1506 Discussed plan of care with Dr. Vo from hospitalist service.  He request we get an ultrasound of the gallbladder and then speak to GI once ultrasound of the gallbladder comes back to determine plan of care. [CJ]   1601 Patient signed out to Dr. Erwin pending reevaluation after imaging [CJ]      ED Course User Index  [CJ] Juan Ramon Escalante PA-C         Diagnoses as of 05/19/24 1601   Fall, initial encounter   Elevated LFTs       Procedures   Procedures    Diagnosis     1. Fall, initial encounter    2. Elevated LFTs        Disposition   Signed out pending reevaluation    ED Prescriptions    None         Disclaimer: This note was dictated by speech recognition. Minor errors in transcription may be present. Please call if questions.       Juan Ramon Escalante PA-C  05/19/24 1602

## 2024-05-19 NOTE — H&P
History Obtained From: Pt    History Of Present Illness:  Tana Hargrove is a 79 y.o. female with PMHx s/f CAD, HTN, COPD, ESRD on HD via permacath, DM2, MDS, gout, neuropathy presenting for a fall. Pt lives at home and admits she rolled out of bed onto the floor this morning. Denies head injury or LOC. She has been admitted 6 times this year already for multiple issues. Most recently last admission she had a cholecystostomy tube placed for chronic cholecystitis. She also had an ex lap to remove adhesions between her liver and anterior abdominal wall that were encasing her gallbladder. She denies any other real symptoms at this time. No worsening abdominal pain, fever, chills, issues with the cholecystostomy tube, nausea, vomiting, chest pain, or other symptoms. She has been compliant with taking her medications and with her hemodialysis schedule.    ED Course (Summary):   Vitals on presentation: 100.4 F, 98 bpm, 20 rr, 145/53 --> 180/71, 93% on RA  Labs: CMP glu 165, Cr 2.38, alk phos 292, , , total bilirubin 4.7, lipase 146, Trop 62 --> 63  CBC WBC 9.6, Hg 7.5, Plt 210  Imaging: CXR - no acute process. Agree with interpretation.  XR pelvis - no acute osseous abnormality. Agree with interpretation.  CT a/p - Percutaneous drainage tube noted within the maria isabel hepatis. This  appears to be related to a decompressed gallbladder versus positioning  status post cholecystectomy with residual fluid within the gallbladder  fossa. Clinical correlation for operative history recommended. Agree with interpretation.  C. US gallbladder - Sludge and stones within the gallbladder with edematous wall thickening. No biliary dilatation is appreciated.   Interventions: Admitted to observation for multiple lab abnormalities, mainly LFTs and bilirubin    ED Course:  ED Course as of 05/19/24 1901   Sun May 19, 2024   1219 EKG performed 5/19/2024 at 11:16 AM.  Sinus rhythm with a ventricular rate of 90 bpm, parable 164 ms,  QT/QTc of 397/507.  No STEMI. [CJ]   1248 CT abdomen pelvis to be added onto the patient due to the patient's abnormal lab findings.  Patient cannot receive contrast due to kidney function. [CJ]   1506 Discussed plan of care with Dr. Vo from hospitalist service.  He request we get an ultrasound of the gallbladder and then speak to GI once ultrasound of the gallbladder comes back to determine plan of care. [CJ]   1601 Patient signed out to Dr. Erwin pending reevaluation after imaging [CJ]      ED Course User Index  [CJ] Juan Ramon Escalante PA-C         Diagnoses as of 05/19/24 1901   Fall, initial encounter   Elevated LFTs     Relevant Results  Results for orders placed or performed during the hospital encounter of 05/19/24 (from the past 24 hour(s))   CBC and Auto Differential   Result Value Ref Range    WBC 9.6 4.4 - 11.3 x10*3/uL    nRBC 0.0 0.0 - 0.0 /100 WBCs    RBC 2.45 (L) 4.00 - 5.20 x10*6/uL    Hemoglobin 7.5 (L) 12.0 - 16.0 g/dL    Hematocrit 22.9 (L) 36.0 - 46.0 %    MCV 94 80 - 100 fL    MCH 30.6 26.0 - 34.0 pg    MCHC 32.8 32.0 - 36.0 g/dL    RDW 22.5 (H) 11.5 - 14.5 %    Platelets 210 150 - 450 x10*3/uL    Immature Granulocytes %, Automated 0.6 0.0 - 0.9 %    Immature Granulocytes Absolute, Automated 0.06 0.00 - 0.50 x10*3/uL   Comprehensive metabolic panel   Result Value Ref Range    Glucose 165 (H) 74 - 99 mg/dL    Sodium 137 136 - 145 mmol/L    Potassium 3.9 3.5 - 5.3 mmol/L    Chloride 101 98 - 107 mmol/L    Bicarbonate 27 21 - 32 mmol/L    Anion Gap 13 10 - 20 mmol/L    Urea Nitrogen 33 (H) 6 - 23 mg/dL    Creatinine 2.38 (H) 0.50 - 1.05 mg/dL    eGFR 20 (L) >60 mL/min/1.73m*2    Calcium 10.0 8.6 - 10.3 mg/dL    Albumin 3.9 3.4 - 5.0 g/dL    Alkaline Phosphatase 292 (H) 33 - 136 U/L    Total Protein 6.4 6.4 - 8.2 g/dL     (H) 9 - 39 U/L    Bilirubin, Total 4.7 (H) 0.0 - 1.2 mg/dL     (H) 7 - 45 U/L   Lactate   Result Value Ref Range    Lactate 1.8 0.4 - 2.0 mmol/L   Lipase   Result Value  Ref Range    Lipase 146 (H) 9 - 82 U/L   Cardiac Enzymes - CPK   Result Value Ref Range    Creatine Kinase 172 0 - 215 U/L   Troponin I, High Sensitivity, Initial   Result Value Ref Range    Troponin I, High Sensitivity 62 (HH) 0 - 13 ng/L   Manual Differential   Result Value Ref Range    Neutrophils %, Manual 71.0 40.0 - 80.0 %    Bands %, Manual 20.0 0.0 - 5.0 %    Lymphocytes %, Manual 5.0 13.0 - 44.0 %    Monocytes %, Manual 4.0 2.0 - 10.0 %    Eosinophils %, Manual 0.0 0.0 - 6.0 %    Basophils %, Manual 0.0 0.0 - 2.0 %    Seg Neutrophils Absolute, Manual 6.82 (H) 1.60 - 5.00 x10*3/uL    Bands Absolute, Manual 1.92 (H) 0.00 - 0.50 x10*3/uL    Lymphocytes Absolute, Manual 0.48 (L) 0.80 - 3.00 x10*3/uL    Monocytes Absolute, Manual 0.38 0.05 - 0.80 x10*3/uL    Eosinophils Absolute, Manual 0.00 0.00 - 0.40 x10*3/uL    Basophils Absolute, Manual 0.00 0.00 - 0.10 x10*3/uL    Total Cells Counted 100     Neutrophils Absolute, Manual 8.74 (H) 1.60 - 5.50 x10*3/uL    RBC Morphology See Below     Hypochromia Mild     Target Cells Few     Ovalocytes Few     Teardrop Cells Few    Light Blue Top   Result Value Ref Range    Extra Tube Hold for add-ons.    Troponin, High Sensitivity, 1 Hour   Result Value Ref Range    Troponin I, High Sensitivity 63 (HH) 0 - 13 ng/L     *Note: Due to a large number of results and/or encounters for the requested time period, some results have not been displayed. A complete set of results can be found in Results Review.      US gallbladder    Result Date: 5/19/2024  STUDY: Right upper quadrant Ultrasound; 5/19/2024 4:28 PM INDICATION: Status post cholecystostomy, elevated LFTs. COMPARISON: CT A&P today, US gallbladder 4/18/2024, MR abdomen 4/18/2024. ACCESSION NUMBER(S): [id number] ORDERING CLINICIAN: CELIA WYNNE TECHNIQUE: Ultrasound of the right upper quadrant.  Multiple images of the right upper quadrant were obtained. FINDINGS: The liver demonstrates normal echogenicity.   The gallbladder  contains sludge as well as multiple stones.  There is edematous wall thickening.  The cholecystectomy tube is visualized within the gallbladder.  Sonographic Luna's sign is negative.    The common bile duct measures 0.3 cm.  There is no intrahepatic biliary dilatation.   The pancreas is not well seen due to overlying bowel gas.  The right kidney measures 9.8 cm in length.  Renal cortical thinning is present and echotexture is increased.  There is no hydronephrosis. There are no stones.  There is a simple cyst measuring 1.2 x 1.0 x 0.8 cm within the midportion.    Sludge and stones within the gallbladder with edematous wall thickening.  No biliary dilatation is appreciated. Increased echogenicity and thinning of the right renal cortex consistent with intrinsic renal disease.  No hydronephrosis. Signed by Lonnie Rodriguez MD    CT abdomen pelvis wo IV contrast    Result Date: 5/19/2024  STUDY: CT Abdomen and Pelvis without IV Contrast; 05/19/2024, 1:35 PM. INDICATION: Signs/Symptoms:Gallbladder surgery, elevated liver enzymes and lipase from baseline. COMPARISON: XR pelvis: 05/19/24, 04/23/24; IR Biliary: 04/27/24; MR abdomen: 04/14/24; US gallbladder: 04/18/24. ACCESSION NUMBER(S): [id number] ORDERING CLINICIAN: CELIA WYNNE TECHNIQUE: CT of the abdomen and pelvis was performed.  Contiguous axial images were obtained at 3 mm slice thickness through the abdomen and pelvis. Coronal and sagittal reconstructions at 3 mm slice thickness were performed. No intravenous contrast was administered.  Automated mA/kV exposure control was utilized and patient examination was performed in strict accordance with principles of ALARA. FINDINGS: Please note that the evaluation of vessels, lymph nodes and organs is limited without intravenous contrast.  LOWER CHEST: No cardiomegaly.  No pericardial effusion.  Atelectatic changes are noted.  Venous catheter terminates in the RIGHT atrium. Atherosclerosis of the thoracic aorta and  coronary arteries is present.  ABDOMEN:  LIVER: No hepatomegaly.  Smooth surface contour.  Normal attenuation.  BILE DUCTS: No intrahepatic or extrahepatic biliary ductal dilatation.  GALLBLADDER: Percutaneously placed drainage tube noted within the maria isabel hepatis. This appears to be a colostomy tube with decompressed gallbladder versus tubing status post cholecystectomy with residual fluid within the gallbladder fossa.  Clinical correlation for operative history recommended. STOMACH: No abnormalities identified.  PANCREAS: No masses or ductal dilatation.  SPLEEN: No splenomegaly or focal splenic lesion.  ADRENAL GLANDS: No thickening or nodules.  KIDNEYS AND URETERS: Kidneys are normal in size and location.  No renal or ureteral calculi.  PELVIS:  BLADDER: No abnormalities identified.  REPRODUCTIVE ORGANS: Patient is status post hysterectomy. Scattered phleboliths are present within the pelvis.  BOWEL: No abnormalities identified.  VESSELS: No abnormalities identified.  Atherosclerosis of the abdominal aorta and iliac arteries is noted.    PERITONEUM/RETROPERITONEUM/LYMPH NODES: No free fluid.  No pneumoperitoneum. No lymphadenopathy.  ABDOMINAL WALL: No abnormalities identified.  Midline incision scar noted. SOFT TISSUES: No abnormalities identified.   BONES: Patient is status post RIGHT femoral ORIF.  Patient is status post multilevel posterior fusion of L2-L5.  Intervertebral disc spacers at L5-S1 also noted.  Dextroconvex scoliosis of the lumbar spine noted. Moderate degenerative change of the hips demonstrated. Senescent changes are present within the lumbar spine.  No acute fracture or aggressive osseous lesion.    Percutaneous drainage tube noted within the maria isabel hepatis. This appears to be related to a decompressed gallbladder versus positioning status post cholecystectomy with residual fluid within the gallbladder fossa. Clinical correlation for operative history recommended. Signed by Abhi Godoy II,  MD    XR pelvis 1-2 views    Result Date: 5/19/2024  STUDY: Pelvis Radiographs; 5/19/2024 10:54AM INDICATION: Fall. COMPARISON: 4/23/2024 XR Pelvis. ACCESSION NUMBER(S): [id number] ORDERING CLINICIAN: CELIA WYNNE TECHNIQUE:  One view(s) of the pelvis. FINDINGS:  The pelvic ring is intact.  There is no acute fracture.  Postsurgical changes of the proximal right femur without evidence of hardware malfunction.  Mild degenerative change of both hips.    No acute osseous abnormality. Signed by Lonnie Rodriguez MD    XR chest 1 view    Result Date: 5/19/2024  STUDY: Chest Radiograph;  5/19/2024 10:54AM INDICATION: Fall. COMPARISON: 4/23/2024 XR Chest. ACCESSION NUMBER(S): [id number] ORDERING CLINICIAN: CELIA WYNNE TECHNIQUE:  Frontal chest was obtained at 11:53 hours. FINDINGS: CARDIOMEDIASTINAL SILHOUETTE: Cardiomediastinal silhouette is normal in size and configuration.  LUNGS: Lungs are clear.  Right-sided dialysis catheter with tip near the cavoatrial junction.  Elevation of the right hemidiaphragm.  ABDOMEN: No remarkable upper abdominal findings.  BONES: No acute osseous changes.    No acute process. Signed by Lonnie Rodriguez MD    IR biliary cholecystostomy    Result Date: 4/27/2024  Interpreted By:  Jin House, STUDY: IR BILIARY CHOLECYSTOSTOMY;  4/27/2024 2:35 pm   INDICATION: Signs/Symptoms:Percutaneous cholecystostomy tube for acute on chronic cholecystitis.   COMPARISON: None.   ACCESSION NUMBER(S): [id number]   ORDERING CLINICIAN: ANNIE HENRY   TECHNIQUE: INTERVENTIONALIST(S): Jin House MD   CONSENT: The patient/patient's POA/next of kin was informed of the nature of the proposed procedure. The purposes, alternatives, risks, and benefits were explained and discussed. All questions were answered and consent was obtained.   RADIATION EXPOSURE: Fluoroscopy time: 1.2 min. Dose: 13.96 mGy. Dose Area Product (DAP): 1.72 Gy cm 2.   SEDATION: Moderate conscious IV sedation services  (supervision of administration, induction, and maintenance) were provided by the physician performing the procedure with intravenous fentanyl 25 mcg and versed 0.5 mg for 22 minutes. The physician was assisted by an independent trained observer, an interventional radiology nurse, in the continuous monitoring of patient level of consciousness and physiologic status.   MEDICATION/CONTRAST: No additional   TIME OUT: A time out was performed immediately prior to procedure start with the interventional team, correctly identifying the patient name, date of birth, MRN, procedure, anatomy (including marking of site and side), patient position, procedure consent form, relevant laboratory and imaging test results, antibiotic administration, safety precautions, and procedure-specific equipment needs.   COMPLICATIONS: No immediate adverse events identified.   FINDINGS: The right upper abdomen was prepped and draped in standard sterile fashion. Initial ultrasound images demonstrate a mildly dilated gallbladder containing several calcified stones. After local anesthesia with lidocaine, a Yueh needle was directed towards the gallbladder under sonographic guidance using a transhepatic approach. An ultrasound image was stored for the permanent record. The stylet was removed, and reflux of bile was seen.   A bile sample was collected and sent to the laboratory for further testing.   Access was maintained with an 0.035 inch Payne wire, which was coiled in the gallbladder. Serial dilatation over a 0.035-inch Payne wire for an eventual 8 [place] cholecystostomy tube was performed with intermittent image guidance. Pigtail loop of the cholecystostomy tube was formed and locked in the gallbladder. Position was confirmed with follow-up cholecystography.   Tube was secured at the skin with a 3-0 Prolene suture and affixed with an adhesive dressing. Cholecystostomy tube was then opened to external gravity drainage. The patient tolerated  procedure well without immediate complication and was transported back in stable condition.       Uneventful 8 [place] cholecystostomy tube placement with no immediate complication.   TUBE MANAGEMENT: Please leave cholecystostomy tube to external gravity drainage and flush forward (towards patient and towards drainage bag) with 5 cc normal saline solution daily to maintain tube patency.   Cholecystostomy maintenance including tube check and management is recommended in 8-12 weeks if the gallbladder is not removed surgically.   I was present for and/or performed the critical portions of the procedure and immediately available throughout the entire procedure.   I personally reviewed the image(s) / study and resident interpretation. I agree with the findings as stated.   Performed and dictated at [facility].   MACRO: None   Signed by: Jin House 4/27/2024 3:22 PM Dictation workstation:   [id number]    NM hepatobiliary    Result Date: 4/25/2024  Interpreted By:  Leah Wiley and Maltbie Grace STUDY: NM HEPATOBILIARY;  4/25/2024 12:15 pm   INDICATION: Signs/Symptoms:Right upper quadrant pain.   COMPARISON: None.   ACCESSION NUMBER(S): [id number]   ORDERING CLINICIAN: SANDY GOLDEN   TECHNIQUE: DIVISION OF NUCLEAR MEDICINE HEPATOBILIARY SCAN ([place])   The patient received an intravenous dose of  5.0 mCi of Tc-99m mebrofenin (Choletec).  Sequential images of the upper abdomen were then acquired over the next 120 minutes.   FINDINGS: There is prompt accumulation of activity within the liver and normal subsequent excretion via the biliary ductal system into the small bowel. Nonvisualization of gallbladder by 2 hours after radiotracer injection suggestive of cystic duct obstruction..       1. Nonvisualization of the gallbladder by 2 hours after radiotracer injection, compatible with cystic duct obstruction and acute cholecystitis.   Findings were communicated with Dr. Holden  [place] by Dr. Day Wheeler via epic secure chat at 12:27 pm on 4/25/2024 with verification.   I personally reviewed the images/study and I agree with the findings as stated by Nuclear Medicine fellow Day Wheeler MD. This study was interpreted at [place], [place].   MACRO: Critical Finding:  See findings. Notification was initiated on 4/25/2024 at 12:27 pm by  Day Wheeler.  (**-YCF-**) Instructions:   Signed by: Leah Wiley 4/25/2024 1:13 PM Dictation workstation:   [id number]    ECG 12 lead    Result Date: 4/24/2024  Sinus rhythm Nonspecific intraventricular conduction delay Nonspecific T abnrm, anterolateral leads Artifact in lead(s) I,II,III,aVR,aVL,aVF,V1,V2 See ED provider note for full interpretation and clinical correlation Confirmed by Terri Bermudez ([id number]) on 4/24/2024 8:14:49 PM    XR chest 1 view    Result Date: 4/23/2024  Interpreted By:  Rashida Sanchez, STUDY: XR CHEST 1 VIEW;  4/23/2024 6:13 am   INDICATION: Signs/Symptoms:fall elderly   COMPARISON: Chest x-ray 04/13/2024.   ACCESSION NUMBER(S): [id number]   ORDERING CLINICIAN: FOREIGN CHARLTON   TECHNIQUE: Portable upright frontal view of the chest was obtained .   FINDINGS: There is a right-sided dual-lumen central catheter with the tip overlying the region of the right atrium.   The cardiomediastinal silhouette is within normal limits.   No focal consolidation, pleural effusion or pneumothorax.   There is elevation of the right hemidiaphragm.       1.  No radiographic evidence of acute cardiopulmonary process.       MACRO: None.   Signed by: Rashida Sanchez 4/23/2024 6:44 AM Dictation workstation:   [id number]    XR pelvis 1-2 views    Result Date: 4/23/2024  Interpreted By:  Rashida Sanchez, STUDY: XR PELVIS 1-2 VIEWS;  4/23/2024 6:13 am   INDICATION: Signs/Symptoms:fall elderly.   COMPARISON: Right hip x-ray 08/11/2023.   ACCESSION NUMBER(S): [id number]   ORDERING CLINICIAN: FOREIGN  LATESHA   FINDINGS: 1 AP supine portable view of the pelvis was obtained.   There is no acute fracture or dislocation. The pelvic ring appears intact. The bilateral hip joints are maintained. Partially visualized orthopedic hardware is transfixing the proximal right femur. Partially visualized orthopedic hardware at the lower lumbar spine. Vascular calcifications are noted. The soft tissues are unremarkable.       1.  No radiographic evidence for acute fracture.       MACRO: None.   Signed by: Rashida Sanchez 4/23/2024 6:40 AM Dictation workstation:   [id number]    CT cervical spine wo IV contrast    Result Date: 4/22/2024  Interpreted By:  Saurabh Suarez, STUDY: CT HEAD WO IV CONTRAST; CT CERVICAL SPINE WO IV CONTRAST;  4/22/2024 4:41 pm   INDICATION: Signs/Symptoms:fall.   COMPARISON: Most recent prior CT scan head is from 02/26/2024.. Correlation with CT scan of the chest, abdomen, and pelvis dated 04/13/2024 and another CT scan chest dated 04/06/2022..   ACCESSION NUMBER(S): [id number]; [id number]   ORDERING CLINICIAN: PALAK HADDAD   TECHNIQUE: Routine axial images were obtained through the calvarium. Sagittal and coronal reconstruction images were generated. Bone, subdural, and brain windows were reviewed.   Thin-section axial images were obtained through the cervical spine. Sagittal and coronal reconstruction images were generated. Bone and soft tissue windows were reviewed.     FINDINGS: PARANASAL SINUSES: The paranasal sinuses were clear. There was mild-to-moderate leftward deviation of the nasal septum. The infundibulum of each ostiomeatal complex was patent. No fluid level in the paranasal sinuses.   CERVICAL SPINE, NECK, AND SKULL BASE: Cervical spine disc space height was preserved throughout. Mild scattered endplate osteophytosis. Joint space loss with spurring in the anterior superior aspect of the atlantoaxial joint. Thickening and calcification of the transverse ligament posterior to the  dens. There is also calcification of the ligamentum flavum at several levels in the mid cervical spine. Multilevel interfacet hypertrophy with spur formation.. There was no cervical spine compression fracture. No posterior element fracture. No listhesis. No suspicious cervical adenopathy. There was no mass posteriorly in the region of the nasopharynx. Mastoid air cells were clear. The left lung apex was clear. There was an infiltrative subpleural nodular density in the posterior right lung apex measuring 20 x 11 mm on axial image 81. This previously measured 18 x 10 mm on 04/13/2024 and was not evident on 04/06/2022. Patient has a right-sided IJ vein catheter in place, with distal tip not included. No suspicious cervical adenopathy in this unenhanced exam. Moderate bilateral carotid bulb arterial calcifications.   BRAIN: There is a high posterior left parietal scalp laceration with edema and a small associated scalp hematoma measuring approximately 16 x 11 x 12 mm. Moderate prominence of ventricles and sulci.   No acute intracranial bleed, midline shift, or focal mass effect. No depressed skull fracture. No lytic or blastic destructive calvarial bone lesion.  Skullbase arterial calcifications in the carotid siphons and vertebral arteries.         High posterior left parietal scalp laceration with small scalp hematoma. No depressed skull fracture. No acute intracranial bleed or focal mass effect.   Moderate volume loss in the brain.   No CT evidence of cervical spine fracture in this exam.   Cervical spine DJD as described.   Ground-glass infiltrative nodular density in the posterior right lung apex is stable to slightly larger compared to CT scan chest from 04/13/2024. This was not present back on 04/06/2022. Small pneumonia versus developing infiltrative tumor. Consider further evaluation with PET-CT scan.   MACRO: None   Signed by: Saurabh Suarez 4/22/2024 4:58 PM Dictation workstation:   [id number]    CT head wo  IV contrast    Result Date: 4/22/2024  Interpreted By:  Saurabh Suarez, STUDY: CT HEAD WO IV CONTRAST; CT CERVICAL SPINE WO IV CONTRAST;  4/22/2024 4:41 pm   INDICATION: Signs/Symptoms:fall.   COMPARISON: Most recent prior CT scan head is from 02/26/2024.. Correlation with CT scan of the chest, abdomen, and pelvis dated 04/13/2024 and another CT scan chest dated 04/06/2022..   ACCESSION NUMBER(S): [id number]; [id number]   ORDERING CLINICIAN: PALAK HADDAD   TECHNIQUE: Routine axial images were obtained through the calvarium. Sagittal and coronal reconstruction images were generated. Bone, subdural, and brain windows were reviewed.   Thin-section axial images were obtained through the cervical spine. Sagittal and coronal reconstruction images were generated. Bone and soft tissue windows were reviewed.     FINDINGS: PARANASAL SINUSES: The paranasal sinuses were clear. There was mild-to-moderate leftward deviation of the nasal septum. The infundibulum of each ostiomeatal complex was patent. No fluid level in the paranasal sinuses.   CERVICAL SPINE, NECK, AND SKULL BASE: Cervical spine disc space height was preserved throughout. Mild scattered endplate osteophytosis. Joint space loss with spurring in the anterior superior aspect of the atlantoaxial joint. Thickening and calcification of the transverse ligament posterior to the dens. There is also calcification of the ligamentum flavum at several levels in the mid cervical spine. Multilevel interfacet hypertrophy with spur formation.. There was no cervical spine compression fracture. No posterior element fracture. No listhesis. No suspicious cervical adenopathy. There was no mass posteriorly in the region of the nasopharynx. Mastoid air cells were clear. The left lung apex was clear. There was an infiltrative subpleural nodular density in the posterior right lung apex measuring 20 x 11 mm on axial image 81. This previously measured 18 x 10 mm on 04/13/2024 and  was not evident on 04/06/2022. Patient has a right-sided IJ vein catheter in place, with distal tip not included. No suspicious cervical adenopathy in this unenhanced exam. Moderate bilateral carotid bulb arterial calcifications.   BRAIN: There is a high posterior left parietal scalp laceration with edema and a small associated scalp hematoma measuring approximately 16 x 11 x 12 mm. Moderate prominence of ventricles and sulci.   No acute intracranial bleed, midline shift, or focal mass effect. No depressed skull fracture. No lytic or blastic destructive calvarial bone lesion.  Skullbase arterial calcifications in the carotid siphons and vertebral arteries.         High posterior left parietal scalp laceration with small scalp hematoma. No depressed skull fracture. No acute intracranial bleed or focal mass effect.   Moderate volume loss in the brain.   No CT evidence of cervical spine fracture in this exam.   Cervical spine DJD as described.   Ground-glass infiltrative nodular density in the posterior right lung apex is stable to slightly larger compared to CT scan chest from 04/13/2024. This was not present back on 04/06/2022. Small pneumonia versus developing infiltrative tumor. Consider further evaluation with PET-CT scan.   MACRO: None   Signed by: Saurabh Suarez 4/22/2024 4:58 PM Dictation workstation:   [id number]     Scheduled medications:  amLODIPine, 5 mg, oral, Daily  heparin (porcine), 5,000 Units, subcutaneous, q8h JOSE DE JESUS  [START ON 5/20/2024] insulin lispro, 0-5 Units, subcutaneous, TID  metoprolol tartrate, 25 mg, oral, BID  [START ON 5/20/2024] pantoprazole, 40 mg, oral, Daily before breakfast   Or  [START ON 5/20/2024] pantoprazole, 40 mg, intravenous, Daily before breakfast  pravastatin, 40 mg, oral, Nightly  pregabalin, 100 mg, oral, BID      Continuous medications:     PRN medications:  PRN medications: bisacodyl, dextrose, dextrose, glucagon, glucagon, guaiFENesin, melatonin, ondansetron **OR**  ondansetron      Past Medical History  She has a past medical history of Abnormal levels of other serum enzymes, Acute kidney failure ([facility]), Anemia, CKD (chronic kidney disease), COPD (chronic obstructive pulmonary disease) (Multi), Coronary artery disease, Disease of blood and blood-forming organs, unspecified, HLD (hyperlipidemia), Hypertension, MDS (myelodysplastic syndrome) (Multi), Personal history of other diseases of the musculoskeletal system and connective tissue, Personal history of other specified conditions, Personal history of other specified conditions, and Type 2 diabetes mellitus (Multi).    She has no past medical history of Adverse effect of anesthesia, Arthritis, Asthma ([facility]), Awareness under anesthesia, CHF (congestive heart failure) (Multi), Delayed emergence from general anesthesia, Disease of thyroid gland, Hard to intubate, History of transfusion, Malignant hyperthermia, PONV (postoperative nausea and vomiting), Pseudocholinesterase deficiency, Spinal headache, or Stroke (Multi).    Surgical History  She has a past surgical history that includes Other surgical history (12/13/2019); Other surgical history (12/13/2019); Other surgical history (Bilateral, 12/13/2019); Other surgical history (Left, 12/13/2019); Other surgical history (12/13/2019); Other surgical history (12/13/2019); Other surgical history (Right, 12/13/2019); Other surgical history (04/16/2021); MR angio head wo IV contrast (11/20/2020); MR angio neck wo IV contrast (11/20/2020); Breast biopsy (Left); Hysterectomy; Breast lumpectomy; Appendectomy; Colonoscopy; Oophorectomy; and Joint replacement.     Social History  She reports that she has never smoked. She has never used smokeless tobacco. She reports that she does not currently use alcohol. She reports that she does not use drugs.    Family History  No family history on file.     Allergies  Codeine, Hydrocodone, Hydrocodone-acetaminophen, Meperidine (pf), Tramadol,  Piperacillin-tazobactam, Adhesive tape-silicones, and Meperidine    Code Status  DNR and No Intubation     Review of Systems   Constitutional:  Negative for activity change, appetite change, chills, diaphoresis, fatigue and fever.   HENT:  Negative for congestion, ear pain, rhinorrhea, sinus pain and sore throat.    Respiratory:  Negative for apnea, cough, chest tightness, shortness of breath, wheezing and stridor.    Cardiovascular:  Negative for chest pain, palpitations and leg swelling.   Gastrointestinal:  Negative for abdominal distention, abdominal pain, constipation, diarrhea, nausea and vomiting.   Genitourinary:  Negative for difficulty urinating, dysuria, flank pain, frequency, hematuria and urgency.   Musculoskeletal:  Negative for arthralgias, back pain, gait problem, joint swelling and myalgias.   Skin:  Negative for color change, pallor, rash and wound.   Neurological:  Negative for dizziness, syncope, weakness, light-headedness, numbness and headaches.   Psychiatric/Behavioral:  Negative for agitation, behavioral problems, confusion and decreased concentration. The patient is not nervous/anxious.    All other systems reviewed and are negative.      Last Recorded Vitals  /71   Pulse 91   Temp (!) 38 °C (100.4 °F) (Temporal)   Resp 18   Wt 69.9 kg (154 lb)   SpO2 95%      Physical Exam  Vitals and nursing note reviewed.   Constitutional:       General: She is not in acute distress.     Appearance: Normal appearance. She is normal weight. She is not ill-appearing or toxic-appearing.   HENT:      Head: Normocephalic and atraumatic.      Mouth/Throat:      Mouth: Mucous membranes are moist.      Pharynx: No posterior oropharyngeal erythema.   Eyes:      Extraocular Movements: Extraocular movements intact.      Pupils: Pupils are equal, round, and reactive to light.   Cardiovascular:      Rate and Rhythm: Normal rate and regular rhythm.      Heart sounds: Normal heart sounds. No murmur heard.      No friction rub. No gallop.   Pulmonary:      Effort: Pulmonary effort is normal. No respiratory distress.      Breath sounds: Normal breath sounds. No stridor. No wheezing, rhonchi or rales.   Abdominal:      General: Abdomen is flat. Bowel sounds are normal. There is no distension.      Palpations: Abdomen is soft. There is no mass.      Tenderness: There is no abdominal tenderness. There is no right CVA tenderness, left CVA tenderness, guarding or rebound.      Comments: Cholecystostomy tube in place, no rash or fluid leaking   Musculoskeletal:         General: No swelling, tenderness, deformity or signs of injury. Normal range of motion.      Right lower leg: No edema.      Left lower leg: No edema.   Skin:     General: Skin is warm and dry.      Coloration: Skin is not jaundiced or pale.      Findings: No bruising, erythema, lesion or rash.   Neurological:      General: No focal deficit present.      Mental Status: She is alert and oriented to person, place, and time. Mental status is at baseline.      Cranial Nerves: No cranial nerve deficit.      Sensory: No sensory deficit.      Motor: No weakness.   Psychiatric:         Mood and Affect: Mood normal.         Behavior: Behavior normal.         Thought Content: Thought content normal.         Judgment: Judgment normal.         Assessment/Plan   Principal Problem:    Fall, initial encounter  Active Problems:    Anxiety    Chronic diastolic heart failure of unknown etiology (Multi)    Hypertension, essential    Stage 4 chronic kidney disease (Multi)    Gout    Repeated falls    Paroxysmal atrial fibrillation (Multi)    Normocytic anemia    Essential (primary) hypertension    HLD (hyperlipidemia)    Type 2 diabetes mellitus without complications (Multi)    ESRD (end stage renal disease) on dialysis (Multi)    Elevated troponin    Elevated bilirubin    Hyperglycemia    Elevated lipase      Plan:  Admit to stepdown unit    Elevated bilirubin, LFTs, lipase:  Surgery  consulted  Serial abdominal exams  Trend labs    CKD, on HD  Renal consulted for HD management    Elevated troponins:  62 --> 63, continue to trend  These are acutely elevated for her  Given no chest pain, cardiac complaints, continue to monitor  Likely secondary to CKD  GB US shows no biliary dilatation, no indication for ERCP at this time.    DM2:  SSI    Normocytic anemia:  Hg 7.5, around baseline    HTN:  Home amlodipine, Lopressor    HLD:  Home statin    Renal diet  DVT ppx: Heparin subcutaneous  DNR arrest, no intubation per discussion with pt today.       DO Zee Paige dictation software was used to dictate this note and thus there may be minor errors in translation/transcription including garbled speech or misspellings. Please contact for clarification if needed.

## 2024-05-20 ENCOUNTER — APPOINTMENT (OUTPATIENT)
Dept: DIALYSIS | Facility: HOSPITAL | Age: 79
End: 2024-05-20
Payer: MEDICARE

## 2024-05-20 LAB
ABO GROUP (TYPE) IN BLOOD: NORMAL
ALBUMIN SERPL BCP-MCNC: 3.2 G/DL (ref 3.4–5)
ALP SERPL-CCNC: 270 U/L (ref 33–136)
ALT SERPL W P-5'-P-CCNC: 187 U/L (ref 7–45)
ANION GAP SERPL CALC-SCNC: 11 MMOL/L (ref 10–20)
ANTIBODY SCREEN: NORMAL
APTT PPP: 32 SECONDS (ref 27–38)
AST SERPL W P-5'-P-CCNC: 117 U/L (ref 9–39)
BILIRUB DIRECT SERPL-MCNC: 1.6 MG/DL (ref 0–0.3)
BILIRUB SERPL-MCNC: 2.9 MG/DL (ref 0–1.2)
BLOOD EXPIRATION DATE: NORMAL
BUN SERPL-MCNC: 35 MG/DL (ref 6–23)
CALCIUM SERPL-MCNC: 9.3 MG/DL (ref 8.6–10.3)
CHLORIDE SERPL-SCNC: 103 MMOL/L (ref 98–107)
CO2 SERPL-SCNC: 27 MMOL/L (ref 21–32)
CREAT SERPL-MCNC: 2.57 MG/DL (ref 0.5–1.05)
DISPENSE STATUS: NORMAL
EGFRCR SERPLBLD CKD-EPI 2021: 18 ML/MIN/1.73M*2
ERYTHROCYTE [DISTWIDTH] IN BLOOD BY AUTOMATED COUNT: 22.8 % (ref 11.5–14.5)
GLUCOSE BLD MANUAL STRIP-MCNC: 159 MG/DL (ref 74–99)
GLUCOSE BLD MANUAL STRIP-MCNC: 209 MG/DL (ref 74–99)
GLUCOSE BLD MANUAL STRIP-MCNC: 98 MG/DL (ref 74–99)
GLUCOSE SERPL-MCNC: 120 MG/DL (ref 74–99)
HAV IGM SER QL: NONREACTIVE
HBV CORE IGM SER QL: NONREACTIVE
HBV SURFACE AG SERPL QL IA: NONREACTIVE
HCT VFR BLD AUTO: 19.8 % (ref 36–46)
HCV AB SER QL: NONREACTIVE
HGB BLD-MCNC: 6.4 G/DL (ref 12–16)
INR PPP: 1.5 (ref 0.9–1.1)
LIPASE SERPL-CCNC: 32 U/L (ref 9–82)
MCH RBC QN AUTO: 30.5 PG (ref 26–34)
MCHC RBC AUTO-ENTMCNC: 32.3 G/DL (ref 32–36)
MCV RBC AUTO: 94 FL (ref 80–100)
NRBC BLD-RTO: 0 /100 WBCS (ref 0–0)
PLATELET # BLD AUTO: 185 X10*3/UL (ref 150–450)
POTASSIUM SERPL-SCNC: 3.9 MMOL/L (ref 3.5–5.3)
PRODUCT BLOOD TYPE: NORMAL
PRODUCT CODE: NORMAL
PROT SERPL-MCNC: 5.6 G/DL (ref 6.4–8.2)
PROTHROMBIN TIME: 17.3 SECONDS (ref 9.8–12.8)
RBC # BLD AUTO: 2.1 X10*6/UL (ref 4–5.2)
RH FACTOR (ANTIGEN D): NORMAL
SODIUM SERPL-SCNC: 137 MMOL/L (ref 136–145)
UNIT ABO: NORMAL
UNIT NUMBER: NORMAL
UNIT RH: NORMAL
UNIT VOLUME: 266
WBC # BLD AUTO: 5.7 X10*3/UL (ref 4.4–11.3)
XM INTEP: NORMAL

## 2024-05-20 PROCEDURE — A4217 STERILE WATER/SALINE, 500 ML: HCPCS | Performed by: INTERNAL MEDICINE

## 2024-05-20 PROCEDURE — 36415 COLL VENOUS BLD VENIPUNCTURE: CPT | Performed by: INTERNAL MEDICINE

## 2024-05-20 PROCEDURE — 86901 BLOOD TYPING SEROLOGIC RH(D): CPT | Performed by: INTERNAL MEDICINE

## 2024-05-20 PROCEDURE — 8010000001 HC DIALYSIS - HEMODIALYSIS PER DAY

## 2024-05-20 PROCEDURE — 36415 COLL VENOUS BLD VENIPUNCTURE: CPT | Performed by: STUDENT IN AN ORGANIZED HEALTH CARE EDUCATION/TRAINING PROGRAM

## 2024-05-20 PROCEDURE — 99221 1ST HOSP IP/OBS SF/LOW 40: CPT | Performed by: SURGERY

## 2024-05-20 PROCEDURE — 82947 ASSAY GLUCOSE BLOOD QUANT: CPT

## 2024-05-20 PROCEDURE — 80074 ACUTE HEPATITIS PANEL: CPT | Mod: PORLAB | Performed by: INTERNAL MEDICINE

## 2024-05-20 PROCEDURE — 83690 ASSAY OF LIPASE: CPT | Performed by: STUDENT IN AN ORGANIZED HEALTH CARE EDUCATION/TRAINING PROGRAM

## 2024-05-20 PROCEDURE — 82248 BILIRUBIN DIRECT: CPT | Performed by: SURGERY

## 2024-05-20 PROCEDURE — 85730 THROMBOPLASTIN TIME PARTIAL: CPT | Performed by: SURGERY

## 2024-05-20 PROCEDURE — 80053 COMPREHEN METABOLIC PANEL: CPT | Performed by: STUDENT IN AN ORGANIZED HEALTH CARE EDUCATION/TRAINING PROGRAM

## 2024-05-20 PROCEDURE — 86922 COMPATIBILITY TEST ANTIGLOB: CPT

## 2024-05-20 PROCEDURE — 99222 1ST HOSP IP/OBS MODERATE 55: CPT | Performed by: INTERNAL MEDICINE

## 2024-05-20 PROCEDURE — 85610 PROTHROMBIN TIME: CPT | Performed by: SURGERY

## 2024-05-20 PROCEDURE — 87040 BLOOD CULTURE FOR BACTERIA: CPT | Mod: 91,PORLAB | Performed by: INTERNAL MEDICINE

## 2024-05-20 PROCEDURE — P9040 RBC LEUKOREDUCED IRRADIATED: HCPCS

## 2024-05-20 PROCEDURE — 87040 BLOOD CULTURE FOR BACTERIA: CPT | Mod: PORLAB | Performed by: INTERNAL MEDICINE

## 2024-05-20 PROCEDURE — 85027 COMPLETE CBC AUTOMATED: CPT | Performed by: STUDENT IN AN ORGANIZED HEALTH CARE EDUCATION/TRAINING PROGRAM

## 2024-05-20 PROCEDURE — 2500000001 HC RX 250 WO HCPCS SELF ADMINISTERED DRUGS (ALT 637 FOR MEDICARE OP): Performed by: STUDENT IN AN ORGANIZED HEALTH CARE EDUCATION/TRAINING PROGRAM

## 2024-05-20 PROCEDURE — 2500000004 HC RX 250 GENERAL PHARMACY W/ HCPCS (ALT 636 FOR OP/ED): Performed by: INTERNAL MEDICINE

## 2024-05-20 PROCEDURE — 99233 SBSQ HOSP IP/OBS HIGH 50: CPT | Performed by: INTERNAL MEDICINE

## 2024-05-20 PROCEDURE — 5A1D70Z PERFORMANCE OF URINARY FILTRATION, INTERMITTENT, LESS THAN 6 HOURS PER DAY: ICD-10-PCS | Performed by: INTERNAL MEDICINE

## 2024-05-20 PROCEDURE — 2060000001 HC INTERMEDIATE ICU ROOM DAILY

## 2024-05-20 PROCEDURE — 36430 TRANSFUSION BLD/BLD COMPNT: CPT

## 2024-05-20 RX ORDER — METRONIDAZOLE 500 MG/100ML
500 INJECTION, SOLUTION INTRAVENOUS EVERY 8 HOURS
Status: DISCONTINUED | OUTPATIENT
Start: 2024-05-20 | End: 2024-05-23

## 2024-05-20 RX ORDER — CEFTRIAXONE 1 G/50ML
1 INJECTION, SOLUTION INTRAVENOUS EVERY 24 HOURS
Status: DISCONTINUED | OUTPATIENT
Start: 2024-05-20 | End: 2024-05-22

## 2024-05-20 RX ORDER — ACETAMINOPHEN 325 MG/1
650 TABLET ORAL EVERY 6 HOURS PRN
Status: DISCONTINUED | OUTPATIENT
Start: 2024-05-20 | End: 2024-05-28 | Stop reason: HOSPADM

## 2024-05-20 RX ORDER — HEPARIN SODIUM 1000 [USP'U]/ML
2000 INJECTION, SOLUTION INTRAVENOUS; SUBCUTANEOUS
Status: DISCONTINUED | OUTPATIENT
Start: 2024-05-21 | End: 2024-05-28 | Stop reason: HOSPADM

## 2024-05-20 RX ORDER — VANCOMYCIN HYDROCHLORIDE 1 G/20ML
INJECTION, POWDER, LYOPHILIZED, FOR SOLUTION INTRAVENOUS DAILY PRN
Status: DISCONTINUED | OUTPATIENT
Start: 2024-05-20 | End: 2024-05-23

## 2024-05-20 RX ORDER — VANCOMYCIN HYDROCHLORIDE 750 MG/150ML
750 INJECTION, SOLUTION INTRAVENOUS
Status: DISCONTINUED | OUTPATIENT
Start: 2024-05-22 | End: 2024-05-23

## 2024-05-20 RX ADMIN — PREGABALIN 100 MG: 25 CAPSULE ORAL at 08:48

## 2024-05-20 RX ADMIN — HEPARIN SODIUM 1600 UNITS: 1000 INJECTION INTRAVENOUS; SUBCUTANEOUS at 17:22

## 2024-05-20 RX ADMIN — VANCOMYCIN HYDROCHLORIDE 1750 MG: 10 INJECTION, POWDER, LYOPHILIZED, FOR SOLUTION INTRAVENOUS at 22:17

## 2024-05-20 RX ADMIN — METRONIDAZOLE 500 MG: 500 INJECTION, SOLUTION INTRAVENOUS at 19:54

## 2024-05-20 RX ADMIN — ACETAMINOPHEN 650 MG: 325 TABLET ORAL at 20:41

## 2024-05-20 RX ADMIN — PANTOPRAZOLE SODIUM 40 MG: 40 TABLET, DELAYED RELEASE ORAL at 05:47

## 2024-05-20 RX ADMIN — CEFTRIAXONE SODIUM 1 G: 1 INJECTION, SOLUTION INTRAVENOUS at 18:40

## 2024-05-20 RX ADMIN — PREGABALIN 100 MG: 25 CAPSULE ORAL at 20:41

## 2024-05-20 RX ADMIN — HEPARIN SODIUM 1600 UNITS: 1000 INJECTION INTRAVENOUS; SUBCUTANEOUS at 17:21

## 2024-05-20 RX ADMIN — PRAVASTATIN SODIUM 40 MG: 40 TABLET ORAL at 20:41

## 2024-05-20 RX ADMIN — METOPROLOL TARTRATE 25 MG: 25 TABLET, FILM COATED ORAL at 20:41

## 2024-05-20 RX ADMIN — ACETAMINOPHEN 650 MG: 325 TABLET ORAL at 01:34

## 2024-05-20 SDOH — SOCIAL STABILITY: SOCIAL INSECURITY: DOES ANYONE TRY TO KEEP YOU FROM HAVING/CONTACTING OTHER FRIENDS OR DOING THINGS OUTSIDE YOUR HOME?: NO

## 2024-05-20 SDOH — SOCIAL STABILITY: SOCIAL INSECURITY: HAS ANYONE EVER THREATENED TO HURT YOUR FAMILY OR YOUR PETS?: NO

## 2024-05-20 SDOH — SOCIAL STABILITY: SOCIAL INSECURITY: HAVE YOU HAD THOUGHTS OF HARMING ANYONE ELSE?: NO

## 2024-05-20 SDOH — SOCIAL STABILITY: SOCIAL INSECURITY: ABUSE: ADULT

## 2024-05-20 SDOH — SOCIAL STABILITY: SOCIAL INSECURITY: ARE YOU OR HAVE YOU BEEN THREATENED OR ABUSED PHYSICALLY, EMOTIONALLY, OR SEXUALLY BY ANYONE?: NO

## 2024-05-20 SDOH — SOCIAL STABILITY: SOCIAL INSECURITY: HAVE YOU HAD ANY THOUGHTS OF HARMING ANYONE ELSE?: NO

## 2024-05-20 SDOH — SOCIAL STABILITY: SOCIAL INSECURITY: WERE YOU ABLE TO COMPLETE ALL THE BEHAVIORAL HEALTH SCREENINGS?: YES

## 2024-05-20 SDOH — SOCIAL STABILITY: SOCIAL INSECURITY: DO YOU FEEL UNSAFE GOING BACK TO THE PLACE WHERE YOU ARE LIVING?: NO

## 2024-05-20 SDOH — SOCIAL STABILITY: SOCIAL INSECURITY: ARE THERE ANY APPARENT SIGNS OF INJURIES/BEHAVIORS THAT COULD BE RELATED TO ABUSE/NEGLECT?: NO

## 2024-05-20 SDOH — SOCIAL STABILITY: SOCIAL INSECURITY: DO YOU FEEL ANYONE HAS EXPLOITED OR TAKEN ADVANTAGE OF YOU FINANCIALLY OR OF YOUR PERSONAL PROPERTY?: NO

## 2024-05-20 ASSESSMENT — ACTIVITIES OF DAILY LIVING (ADL)
ADEQUATE_TO_COMPLETE_ADL: YES
TOILETING: INDEPENDENT
JUDGMENT_ADEQUATE_SAFELY_COMPLETE_DAILY_ACTIVITIES: YES
HEARING - LEFT EAR: FUNCTIONAL
HEARING - RIGHT EAR: FUNCTIONAL
WALKS IN HOME: NEEDS ASSISTANCE
PATIENT'S MEMORY ADEQUATE TO SAFELY COMPLETE DAILY ACTIVITIES?: YES
LACK_OF_TRANSPORTATION: NO
GROOMING: INDEPENDENT
FEEDING YOURSELF: INDEPENDENT
DRESSING YOURSELF: INDEPENDENT
LACK_OF_TRANSPORTATION: NO
BATHING: INDEPENDENT
ASSISTIVE_DEVICE: WALKER

## 2024-05-20 ASSESSMENT — PAIN - FUNCTIONAL ASSESSMENT
PAIN_FUNCTIONAL_ASSESSMENT: 0-10

## 2024-05-20 ASSESSMENT — COGNITIVE AND FUNCTIONAL STATUS - GENERAL
PATIENT BASELINE BEDBOUND: NO
HELP NEEDED FOR BATHING: A LITTLE
CLIMB 3 TO 5 STEPS WITH RAILING: A LITTLE
TOILETING: A LITTLE
STANDING UP FROM CHAIR USING ARMS: A LITTLE
DRESSING REGULAR LOWER BODY CLOTHING: A LITTLE
MOBILITY SCORE: 20
DRESSING REGULAR UPPER BODY CLOTHING: A LITTLE
WALKING IN HOSPITAL ROOM: A LITTLE
PERSONAL GROOMING: A LITTLE
MOVING TO AND FROM BED TO CHAIR: A LITTLE
DAILY ACTIVITIY SCORE: 19

## 2024-05-20 ASSESSMENT — LIFESTYLE VARIABLES
AUDIT-C TOTAL SCORE: 0
HOW OFTEN DO YOU HAVE 6 OR MORE DRINKS ON ONE OCCASION: NEVER
HOW OFTEN DO YOU HAVE A DRINK CONTAINING ALCOHOL: NEVER
SKIP TO QUESTIONS 9-10: 1
AUDIT-C TOTAL SCORE: 0
HOW MANY STANDARD DRINKS CONTAINING ALCOHOL DO YOU HAVE ON A TYPICAL DAY: PATIENT DOES NOT DRINK

## 2024-05-20 ASSESSMENT — PAIN DESCRIPTION - LOCATION: LOCATION: HEAD

## 2024-05-20 ASSESSMENT — PAIN SCALES - GENERAL
PAINLEVEL_OUTOF10: 0 - NO PAIN
PAINLEVEL_OUTOF10: 3
PAINLEVEL_OUTOF10: 0 - NO PAIN

## 2024-05-20 NOTE — PROGRESS NOTES
Tana Hargrove is a 79 y.o. female admitted for Fall, initial encounter. Pharmacy reviewed the patient's [id number] medications and allergies for accuracy.    The list below reflects the PTA list prior to pharmacy medication history. A summary a changes to the PTA medication list has been listed below. Please review each medication in order reconciliation for additional clarification and justification.    Source of information  PHARMACY    Medications added:    Medications modified:    Medications to be removed:  ALLOPURINOL 100MG   AMLODIPINE      Medications of concern:  SEVELAMER 800MG 1 TID      Prior to Admission Medications   Prescriptions Last Dose Informant Patient Reported? Taking?   SITagliptin phosphate (Januvia) 25 mg tablet   No No   Sig: Take 1 tablet (25 mg) by mouth once daily.   allopurinol (Zyloprim) 100 mg tablet   No No   Sig: Take 1 tablet (100 mg) by mouth once daily.   amLODIPine (Norvasc) 5 mg tablet   Yes No   Sig: Take 1 tablet (5 mg) by mouth once daily.   cholecalciferol (Vitamin D-3) 50 MCG (2000 UT) tablet   Yes No   Sig: Take 1 tablet (2,000 Units) by mouth once daily.   cyanocobalamin (Vitamin B-12) 1,000 mcg tablet   Yes No   Sig: Take 1 tablet (1,000 mcg) by mouth once daily.   metoprolol tartrate (Lopressor) 25 mg tablet   No No   Sig: Take 1 tablet (25 mg) by mouth 2 times a day.   pioglitazone (Actos) 15 mg tablet   No No   Sig: Take 1 tablet (15 mg) by mouth once daily.   pravastatin (Pravachol) 40 mg tablet   No No   Sig: Take 1 tablet (40 mg) by mouth once daily at bedtime.   pregabalin (Lyrica) 100 mg capsule   No No   Sig: TAKE ONE CAPSULE BY MOUTH TWICE DAILY @9AM & 5PM   sevelamer carbonate (Renvela) 800 mg tablet   No No   Sig: Take 1 tablet (800 mg) by mouth 3 times a day with meals. Swallow tablet whole; do not crush, break, or chew.   Patient not taking: Reported on 5/14/2024      Facility-Administered Medications: None       Reyna Sweeney

## 2024-05-20 NOTE — PROCEDURES
Report to Receiving RN:    Report To: Alivia Graves RN  Time Report Called: 1700  Hand-Off Communication:   Pt tolerated tx well. Removed 1L.  Post vitals: 161/64 (99) - 72.9 kg - 99.6*F  Complications During Treatment: No  Ultrafiltration Treatment: No  Medications Administered During Dialysis: No  Blood Products Administered During Dialysis: No  Labs Sent During Dialysis: Yes, sent back w/ PCNA (Hep) & transport (1 set of cultures)  Heparin Drip Rate Changes: N/A  Dialysis Catheter Dressing:   Dressing changed, dated 05/20/2024 and initialed TE  Last Dressing Change:   05/202/2024    Electronic Signatures:  Macey Waller OCDT    Last Updated: 5:00 PM by MACEY WALLER

## 2024-05-20 NOTE — PROGRESS NOTES
Tana Hargrove is a 79 y.o. female on day 0 of admission presenting with Fall, initial encounter.      Subjective   Tana Hargrove is a 79 y.o. female with PMHx s/f CAD, HTN, COPD, ESRD on HD via permacath, DM2, MDS, gout, neuropathy presenting for a fall. Pt lives at home and admits she rolled out of bed onto the floor this morning. Denies head injury or LOC. She has been admitted 6 times this year already for multiple issues. Most recently last admission she had a cholecystostomy tube placed for chronic cholecystitis. She also had an ex lap to remove adhesions between her liver and anterior abdominal wall that were encasing her gallbladder. She denies any other real symptoms at this time. No worsening abdominal pain, fever, chills, issues with the cholecystostomy tube, nausea, vomiting, chest pain, or other symptoms. She has been compliant with taking her medications and with her hemodialysis schedule.     ED Course (Summary):   Vitals on presentation: 100.4 F, 98 bpm, 20 rr, 145/53 --> 180/71, 93% on RA  Labs: CMP glu 165, Cr 2.38, alk phos 292, , , total bilirubin 4.7, lipase 146, Trop 62 --> 63  CBC WBC 9.6, Hg 7.5, Plt 210  Imaging: CXR - no acute process. Agree with interpretation.  XR pelvis - no acute osseous abnormality. Agree with interpretation.  CT a/p - Percutaneous drainage tube noted within the maria isabel hepatis. This  appears to be related to a decompressed gallbladder versus positioning  status post cholecystectomy with residual fluid within the gallbladder  fossa. Clinical correlation for operative history recommended. Agree with interpretation.  C. US gallbladder - Sludge and stones within the gallbladder with edematous wall thickening. No biliary dilatation is appreciated.   Interventions: Admitted to observation for multiple lab abnormalities, mainly LFTs and bilirubin      05/20: Patient was evaluated this morning, awake and alert, denies having chest pain or shortness of breath, no  nausea or vomiting.  Liver function is improving          Objective     Last Recorded Vitals  /63   Pulse 83   Temp 37.6 °C (99.7 °F) (Temporal)   Resp 18   Wt 72.7 kg (160 lb 4.4 oz)   SpO2 94%   Intake/Output last 3 Shifts:    Intake/Output Summary (Last 24 hours) at 5/20/2024 1349  Last data filed at 5/20/2024 1310  Gross per 24 hour   Intake 342 ml   Output 0 ml   Net 342 ml       Admission Weight  Weight: 69.9 kg (154 lb) (05/19/24 1117)    Daily Weight  05/20/24 : 72.7 kg (160 lb 4.4 oz)    Image Results  ECG 12 lead  Sinus rhythm  Minimal ST depression, lateral leads  Prolonged QT interval      Physical Exam  Patient is awake and orient, not in apparent distress  Eyes: PERRLA, no conjunctival congestion  Chest: Bilateral Air entry, no crackles or wheezing  Heart: s1S2 regular, no murmur  Abdomen: Soft, non tender, BS present, cholecystostomy tube in place  Ext:    Relevant Results               Assessment/Plan   This patient currently has cardiac telemetry ordered; if you would like to modify or discontinue the telemetry order, click here to go to the orders activity to modify/discontinue the order.    This patient has a central line   Reason for the central line remaining today? Dialysis/Hemapheresis        Elevated liver enzymes  US abdomen shows sludge and stones within the gallbladder, no biliary dilatation.  Will obtain hepatitis panel  Monitor  Consider GI workup if no improvement in liver function        Physical debility/deconditioning  PT/OT consulted       ESRD on hemodialysis  Renal consulted for HD management     Elevated troponins:  Patient denies having chest pain, EKG shows minimal ST depression in lateral leads  Cardiology consulted-recommended medical management and signed off.       DM2:  SSI     Normocytic anemia:  Hg 7.5, around baseline     HTN:  Home amlodipine, Lopressor     HLD:  Home statin     Renal diet  DVT ppx: Heparin subcutaneous  DNR arrest, no intubation      Physical debility/deconditioning\  PT/OT      Sepsis with organ dysfunction of elevated bilirubin  Patient developed fever with chills while in dialysis  Started on IV Rocephin/vancomycin after obtaining cultures  Follow septic workup  Monitor           Kate Edmond MD

## 2024-05-20 NOTE — CARE PLAN
The patient's goals for the shift include      The clinical goals for the shift include patient will remain safe this shift      Problem: Pain - Adult  Goal: Verbalizes/displays adequate comfort level or baseline comfort level  Outcome: Progressing     Problem: Safety - Adult  Goal: Free from fall injury  Outcome: Progressing     Problem: Discharge Planning  Goal: Discharge to home or other facility with appropriate resources  Outcome: Progressing     Problem: Chronic Conditions and Co-morbidities  Goal: Patient's chronic conditions and co-morbidity symptoms are monitored and maintained or improved  Outcome: Progressing     Problem: Diabetes  Goal: Achieve decreasing blood glucose levels by end of shift  Outcome: Progressing  Goal: Increase stability of blood glucose readings by end of shift  Outcome: Progressing  Goal: Decrease in ketones present in urine by end of shift  Outcome: Progressing  Goal: Maintain electrolyte levels within acceptable range throughout shift  Outcome: Progressing  Goal: Maintain glucose levels >70mg/dl to <250mg/dl throughout shift  Outcome: Progressing  Goal: No changes in neurological exam by end of shift  Outcome: Progressing  Goal: Learn about and adhere to nutrition recommendations by end of shift  Outcome: Progressing  Goal: Vital signs within normal range for age by end of shift  Outcome: Progressing  Goal: Increase self care and/or family involovement by end of shift  Outcome: Progressing  Goal: Receive DSME education by end of shift  Outcome: Progressing     Problem: Heart Failure  Goal: Improved gas exchange this shift  Outcome: Progressing  Goal: Improved urinary output this shift  Outcome: Progressing  Goal: Reduction in peripheral edema within 24 hours  Outcome: Progressing  Goal: Report improvement of dyspnea/breathlessness this shift  Outcome: Progressing  Goal: Weight from fluid excess reduced over 2-3 days, then stabilize  Outcome: Progressing  Goal: Increase self care  and/or family involvement in 24 hours  Outcome: Progressing     Problem: Skin  Goal: Decreased wound size/increased tissue granulation at next dressing change  Outcome: Progressing  Goal: Participates in plan/prevention/treatment measures  Outcome: Progressing  Goal: Prevent/manage excess moisture  Outcome: Progressing  Goal: Prevent/minimize sheer/friction injuries  Outcome: Progressing  Goal: Promote/optimize nutrition  Outcome: Progressing  Goal: Promote skin healing  Outcome: Progressing  Flowsheets (Taken 5/20/2024 1927)  Promote skin healing:   Turn/reposition every 2 hours/use positioning/transfer devices   Protective dressings over bony prominences   Assess skin/pad under line(s)/device(s)     Problem: Fall/Injury  Goal: Not fall by end of shift  Outcome: Progressing  Goal: Be free from injury by end of the shift  Outcome: Progressing  Goal: Verbalize understanding of personal risk factors for fall in the hospital  Outcome: Progressing  Goal: Verbalize understanding of risk factor reduction measures to prevent injury from fall in the home  Outcome: Progressing  Goal: Use assistive devices by end of the shift  Outcome: Progressing  Goal: Pace activities to prevent fatigue by end of the shift  Outcome: Progressing

## 2024-05-20 NOTE — CARE PLAN
Problem: Pain - Adult  Goal: Verbalizes/displays adequate comfort level or baseline comfort level  Outcome: Progressing     Problem: Safety - Adult  Goal: Free from fall injury  Outcome: Progressing     Problem: Discharge Planning  Goal: Discharge to home or other facility with appropriate resources  Outcome: Progressing     Problem: Chronic Conditions and Co-morbidities  Goal: Patient's chronic conditions and co-morbidity symptoms are monitored and maintained or improved  Outcome: Progressing     Problem: Diabetes  Goal: Achieve decreasing blood glucose levels by end of shift  Outcome: Progressing  Goal: Increase stability of blood glucose readings by end of shift  Outcome: Progressing  Goal: Decrease in ketones present in urine by end of shift  Outcome: Progressing  Goal: Maintain electrolyte levels within acceptable range throughout shift  Outcome: Progressing  Goal: Maintain glucose levels >70mg/dl to <250mg/dl throughout shift  Outcome: Progressing  Goal: No changes in neurological exam by end of shift  Outcome: Progressing  Goal: Learn about and adhere to nutrition recommendations by end of shift  Outcome: Progressing  Goal: Vital signs within normal range for age by end of shift  Outcome: Progressing  Goal: Increase self care and/or family involovement by end of shift  Outcome: Progressing  Goal: Receive DSME education by end of shift  Outcome: Progressing     Problem: Heart Failure  Goal: Improved gas exchange this shift  Outcome: Progressing  Goal: Improved urinary output this shift  Outcome: Progressing  Goal: Reduction in peripheral edema within 24 hours  Outcome: Progressing  Goal: Report improvement of dyspnea/breathlessness this shift  Outcome: Progressing  Goal: Weight from fluid excess reduced over 2-3 days, then stabilize  Outcome: Progressing  Goal: Increase self care and/or family involvement in 24 hours  Outcome: Progressing

## 2024-05-20 NOTE — PROGRESS NOTES
05/20/24 1200   Discharge Planning   Living Arrangements Alone   Support Systems Family members   Assistance Needed ADL`s   Type of Residence Private residence   Who is requesting discharge planning? Provider   Home or Post Acute Services In home services   Type of Home Care Services Home nursing visits;Home health aide   Patient expects to be discharged to: home   Housing Stability   In the last 12 months, was there a time when you were not able to pay the mortgage or rent on time? N   In the last 12 months, how many places have you lived? 1   In the last 12 months, was there a time when you did not have a steady place to sleep or slept in a shelter (including now)? N   Transportation Needs   In the past 12 months, has lack of transportation kept you from medical appointments or from getting medications? no   In the past 12 months, has lack of transportation kept you from meetings, work, or from getting things needed for daily living? No     Met with patient at bedside to discuss discharge planning.   Introduced self and role.  Daughter and son at bedside, ok to proceed per patient.  Pt lives alone in a condo, no steps to enter.  Pt uses a rollator for transfers.  Pt receives nurse and aide visits ([facility]).  Pt receives dialysis at [facility] in [facility] Monday, Wednesday, and Friday.  Plan is to discharge home with previous [facility] services.

## 2024-05-20 NOTE — PROGRESS NOTES
Social work consult placed for positive medical risk screen. SW reviewed pt's chart and communicated with TCC. No SW needs foreseen at this time. SW signing off; available upon request.    Luc Bland, MSW, LSW ([id number])   Care Transitions

## 2024-05-20 NOTE — PROGRESS NOTES
"Vancomycin Dosing by Pharmacy- INITIAL    Tana Hargrove is a 79 y.o. year old female who Pharmacy has been consulted for vancomycin dosing for other abdominal infection . Based on the patient's indication and renal status this patient will be dosed based on a goal AUC of 500-600.     HD patient.    Visit Vitals  /63   Pulse 83   Temp (!) 37.9 °C (100.3 °F) (Temporal)   Resp 18        Lab Results   Component Value Date    CREATININE 2.57 (H) 05/20/2024    CREATININE 2.38 (H) 05/19/2024    CREATININE 2.73 (H) 04/29/2024    CREATININE 2.64 (H) 04/28/2024        Patient weight is No results found for: \"PTWEIGHT\"    No results found for: \"CULTURE\"     No intake/output data recorded.  [unfilled]    Lab Results   Component Value Date    PATIENTTEMP 37.0 04/13/2024    PATIENTTEMP 37.0 02/08/2024    PATIENTTEMP 37.0 08/11/2023    PATIENTTEMP 37.0 04/07/2022    PATIENTTEMP 37.0 04/06/2022          Assessment/Plan     Patient will be given a loading dose of 1750 mg.  Will initiate vancomycin maintenance, 750mg post-dialysis.  Follow-up level will be ordered on 5/23 at 5:00 unless clinically indicated sooner.  Will continue to monitor renal function daily while on vancomycin and order serum creatinine at least every 48 hours if not already ordered.  Follow for continued vancomycin needs, clinical response, and signs/symptoms of toxicity.       Willard Oreilly, PharmD       "

## 2024-05-20 NOTE — CONSULTS
Nephrology Consult Note                                                                                                                                         Inpatient consult to Renal Care  Consult performed by: Pinky Christie DO  Consult ordered by: Saurabh Vo DO                                                                                                               HPI  Patient is a 79 y.o. female well-known to me with ESRD on hemodialysis Monday Wednesday Friday at [facility] who is admitted to hospital with complaints of   slipped/rolling out of bed. Nephrology consulted in view of ESRD.   Patient also recently had acute cholecystitis that is being treated with a percutaneous cholecystotomy tube.  Dialysis is arranged and I saw her on dialysis today.  Unfortunately towards the end of dialysis she had rigors.  She also spiked a temperature to 101.    Past Medical History:   Diagnosis Date    Abnormal levels of other serum enzymes     Elevated liver enzymes    Acute kidney failure (CMS-HCC)     Anemia     CKD (chronic kidney disease)     COPD (chronic obstructive pulmonary disease) (Multi)     Coronary artery disease     Disease of blood and blood-forming organs, unspecified     Bone marrow disorder    HLD (hyperlipidemia)     Hypertension     MDS (myelodysplastic syndrome) (Multi)     Personal history of other diseases of the musculoskeletal system and connective tissue     History of muscle pain    Personal history of other specified conditions     History of insomnia    Personal history of other specified conditions     History of edema    Type 2 diabetes mellitus (Multi)       Social History     Socioeconomic History    Marital status:      Spouse name: Not on file    Number of children: Not on file    Years of education: Not on file     Highest education level: Not on file   Occupational History    Not on file   Tobacco Use    Smoking status: Never    Smokeless tobacco: Never   Vaping Use    Vaping status: Never Used   Substance and Sexual Activity    Alcohol use: Not Currently    Drug use: Never    Sexual activity: Not Currently   Other Topics Concern    Not on file   Social History Narrative    Not on file     Social Determinants of Health     Financial Resource Strain: Low Risk  (5/20/2024)    Overall Financial Resource Strain (CARDIA)     Difficulty of Paying Living Expenses: Not hard at all   Food Insecurity: No Food Insecurity (4/16/2024)    Hunger Vital Sign     Worried About Running Out of Food in the Last Year: Never true     Ran Out of Food in the Last Year: Never true   Transportation Needs: No Transportation Needs (5/20/2024)    PRAPARE - Transportation     Lack of Transportation (Medical): No     Lack of Transportation (Non-Medical): No   Physical Activity: Not on file   Stress: Not on file   Social Connections: Feeling Somewhat Isolated (2/20/2024)    OASIS : Social Isolation     Frequency of experiencing loneliness or isolation: Sometimes   Intimate Partner Violence: Not on file   Housing Stability: Low Risk  (5/20/2024)    Housing Stability Vital Sign     Unable to Pay for Housing in the Last Year: No     Number of Places Lived in the Last Year: 1     Unstable Housing in the Last Year: No      No family history on file.   No current facility-administered medications on file prior to encounter.     Current Outpatient Medications on File Prior to Encounter   Medication Sig Dispense Refill    allopurinol (Zyloprim) 100 mg tablet Take 1 tablet (100 mg) by mouth once daily. (Patient not taking: Reported on 5/20/2024) 90 tablet 1    cholecalciferol (Vitamin D-3) 50 MCG (2000 UT) tablet Take 1 tablet (2,000 Units) by mouth once daily.      cyanocobalamin (Vitamin B-12) 1,000 mcg tablet Take 1 tablet (1,000 mcg) by mouth once daily.       metoprolol tartrate (Lopressor) 25 mg tablet Take 1 tablet (25 mg) by mouth 2 times a day. 60 tablet 3    pioglitazone (Actos) 15 mg tablet Take 1 tablet (15 mg) by mouth once daily. 30 tablet 3    pravastatin (Pravachol) 40 mg tablet Take 1 tablet (40 mg) by mouth once daily at bedtime. 90 tablet 1    pregabalin (Lyrica) 100 mg capsule TAKE ONE CAPSULE BY MOUTH TWICE DAILY @9AM & 5PM 60 capsule 0    sevelamer carbonate (Renvela) 800 mg tablet Take 1 tablet (800 mg) by mouth 3 times a day with meals. Swallow tablet whole; do not crush, break, or chew. (Patient not taking: Reported on 5/14/2024) 30 tablet 1    SITagliptin phosphate (Januvia) 25 mg tablet Take 1 tablet (25 mg) by mouth once daily. 30 tablet 3    [DISCONTINUED] amLODIPine (Norvasc) 5 mg tablet Take 1 tablet (5 mg) by mouth once daily.        Scheduled medications  amLODIPine, 5 mg, oral, Daily  cefTRIAXone, 1 g, intravenous, q24h  [Held by provider] heparin (porcine), 5,000 Units, subcutaneous, q8h JOSE DE JESUS  [START ON 5/21/2024] heparin, 2,000 Units, intra-catheter, After Dialysis  [START ON 5/21/2024] heparin, 2,000 Units, intra-catheter, After Dialysis  insulin lispro, 0-5 Units, subcutaneous, TID  metoprolol tartrate, 25 mg, oral, BID  metroNIDAZOLE, 500 mg, intravenous, q8h  pantoprazole, 40 mg, oral, Daily before breakfast   Or  pantoprazole, 40 mg, intravenous, Daily before breakfast  pravastatin, 40 mg, oral, Nightly  pregabalin, 100 mg, oral, BID  vancomycin, 1,750 mg, intravenous, Once  [START ON 5/22/2024] vancomycin, 750 mg, intravenous, Every Mon/Wed/Fri      Continuous medications     PRN medications  PRN medications: acetaminophen, bisacodyl, dextrose, dextrose, glucagon, glucagon, guaiFENesin, melatonin, ondansetron **OR** ondansetron, vancomycin     Review of systems  as per HPI otherwise 10 point review systems negative    /70 (BP Location: Right arm, Patient Position: Lying)   Pulse 102   Temp (!) 38.3 °C (101 °F) (Temporal)    "Resp 18   Ht 1.549 m (5' 1\")   Wt 72.7 kg (160 lb 4.4 oz)   SpO2 96%   BMI 30.28 kg/m²     Input / Output:  24 HR:   Intake/Output Summary (Last 24 hours) at 5/20/2024 1748  Last data filed at 5/20/2024 1647  Gross per 24 hour   Intake 1542 ml   Output 1412 ml   Net 130 ml       Physical Exam   Alert and oriented x 3, NAD active rigors    EOMI  OP clear  Neck: supple, No JVD  Right IJ TDC  CV: RRR without m/r/g  Lungs: CTA bilaterally  Abd: soft NT/ND +BS  Ext: No lower extremity edema   Neuro: grossly intact  Skin: no rashes    Results from last 7 days   Lab Units 05/20/24  0434 05/19/24  1148   SODIUM mmol/L 137 137   POTASSIUM mmol/L 3.9 3.9   CHLORIDE mmol/L 103 101   CO2 mmol/L 27 27   BUN mg/dL 35* 33*   CREATININE mg/dL 2.57* 2.38*   GLUCOSE mg/dL 120* 165*   CALCIUM mg/dL 9.3 10.0        Results from last 7 days   Lab Units 05/20/24  0434   SODIUM mmol/L 137   POTASSIUM mmol/L 3.9   CHLORIDE mmol/L 103   CO2 mmol/L 27   BUN mg/dL 35*   CREATININE mg/dL 2.57*   CALCIUM mg/dL 9.3   PROTEIN TOTAL g/dL 5.6*   BILIRUBIN TOTAL mg/dL 2.9*   ALK PHOS U/L 270*   ALT U/L 187*   AST U/L 117*   GLUCOSE mg/dL 120*           Results from last 7 days   Lab Units 05/20/24  0434 05/19/24  1148 05/14/24  1116   WBC AUTO x10*3/uL 5.7 9.6 4.9   HEMOGLOBIN g/dL 6.4* 7.5* 8.2*   HEMATOCRIT % 19.8* 22.9* 26.0*   PLATELETS AUTO x10*3/uL 185 210 222        US gallbladder   Final Result   Sludge and stones within the gallbladder with edematous wall   thickening.  No biliary dilatation is appreciated.   Increased echogenicity and thinning of the right renal cortex   consistent with intrinsic renal disease.  No hydronephrosis.    Signed by Lonnie Rodriguez MD      CT abdomen pelvis wo IV contrast   Final Result   Percutaneous drainage tube noted within the maria isabel hepatis. This   appears to be related to a decompressed gallbladder versus positioning   status post cholecystectomy with residual fluid within the gallbladder   fossa. " Clinical correlation for operative history recommended.   Signed by Abhi Godoy II, MD      XR chest 1 view   Final Result   No acute process.   Signed by Lonnie Rodriguez MD      XR pelvis 1-2 views   Final Result   No acute osseous abnormality.   Signed by Lonnie Rodriguez MD           Assessment:   Patient is 79 y.o. female history of DM 2, ESRD, hypertension who is admitted to hospital for fall. Nephrology consulted in view of ESRD.    ESRD  -Hemodialysis Monday Wednesday Friday at [facility]  -Has been compliant with dialysis    Hypertension  -Controlled    Anemia of ESRD and myelodysplastic syndrome  -She is on MARILYNN with dialysis  -Hemoglobin is 6.4  -Hemoglobin has been between 8 and 9 as an outpatient in the month of May    CKD-MBD  -Patient currently not on a phosphorus binder    Fever on dialysis-  Has percutaneous cutaneous cholecystotomy drain  -Surgery following    Elevated liver enzymes    Recommendations:   -Hemodialysis today, UF 1 L, tolerated well  -Blood cultures ordered for 1 set from dialysis line and other set peripheral  -Patient given Rocephin and IV Vanco after dialysis    Discussed with primary service      Please message me through [employer] chat with any questions or concerns.     Pinky Christie DO  5/20/2024  5:48 PM         [facility]    [address], [address]   [place] [address]  Office: [phone]

## 2024-05-20 NOTE — CONSULTS
Reason For Consult  Cholecystostomy tube    History Of Present Illness  Tana Hargrove is a 79 y.o. female who states that she slipped and found herself on the floor in her bedroom.  She reports feeling weak and caused her to slip down to the floor - states was down for about 4 hours.  She denies LOC nor hitting head.  Her history is noted for acute on chronic cholecystitis with an aborted lap chidi and subsequent percutaneous cholecystostomy tube placement performed 27th April 2024.  She denies abdominal pain, had been tolerating PO intake otherwise.  Has been emptying her bag daily but does state that she can have some discomfort with instilling the saline flushes.      Past Medical History:   Diagnosis Date    Abnormal levels of other serum enzymes     Elevated liver enzymes    Acute kidney failure (CMS-HCC)     Anemia     CKD (chronic kidney disease)     COPD (chronic obstructive pulmonary disease) (Multi)     Coronary artery disease     Disease of blood and blood-forming organs, unspecified     Bone marrow disorder    HLD (hyperlipidemia)     Hypertension     MDS (myelodysplastic syndrome) (Multi)     Personal history of other diseases of the musculoskeletal system and connective tissue     History of muscle pain    Personal history of other specified conditions     History of insomnia    Personal history of other specified conditions     History of edema    Type 2 diabetes mellitus (Multi)      Past Surgical History:   Procedure Laterality Date    APPENDECTOMY      BREAST BIOPSY Left     left excisional    BREAST LUMPECTOMY      COLONOSCOPY      HYSTERECTOMY      JOINT REPLACEMENT      MR HEAD ANGIO WO IV CONTRAST  11/20/2020    MR HEAD ANGIO WO IV CONTRAST 11/20/2020 [facility]    MR NECK ANGIO WO IV CONTRAST  11/20/2020    MR NECK ANGIO WO IV CONTRAST 11/20/2020 [facility]    OOPHORECTOMY      OTHER SURGICAL HISTORY  12/13/2019    Oophorectomy bilateral    OTHER SURGICAL HISTORY   "12/13/2019    Tubal ligation    OTHER SURGICAL HISTORY Bilateral 12/13/2019    Knee replacement    OTHER SURGICAL HISTORY Left 12/13/2019    Shoulder surgery    OTHER SURGICAL HISTORY  12/13/2019    Hysterectomy    OTHER SURGICAL HISTORY  12/13/2019    Lumpectomy    OTHER SURGICAL HISTORY Right 12/13/2019    Foot surgery    OTHER SURGICAL HISTORY  04/16/2021    Back surgery     No current facility-administered medications on file prior to encounter.     Current Outpatient Medications on File Prior to Encounter   Medication Sig Dispense Refill    allopurinol (Zyloprim) 100 mg tablet Take 1 tablet (100 mg) by mouth once daily. 90 tablet 1    amLODIPine (Norvasc) 5 mg tablet Take 1 tablet (5 mg) by mouth once daily.      cholecalciferol (Vitamin D-3) 50 MCG (2000 UT) tablet Take 1 tablet (2,000 Units) by mouth once daily.      cyanocobalamin (Vitamin B-12) 1,000 mcg tablet Take 1 tablet (1,000 mcg) by mouth once daily.      metoprolol tartrate (Lopressor) 25 mg tablet Take 1 tablet (25 mg) by mouth 2 times a day. 60 tablet 3    pioglitazone (Actos) 15 mg tablet Take 1 tablet (15 mg) by mouth once daily. 30 tablet 3    pravastatin (Pravachol) 40 mg tablet Take 1 tablet (40 mg) by mouth once daily at bedtime. 90 tablet 1    pregabalin (Lyrica) 100 mg capsule TAKE ONE CAPSULE BY MOUTH TWICE DAILY @9AM & 5PM 60 capsule 0    sevelamer carbonate (Renvela) 800 mg tablet Take 1 tablet (800 mg) by mouth 3 times a day with meals. Swallow tablet whole; do not crush, break, or chew. (Patient not taking: Reported on 5/14/2024) 30 tablet 1    SITagliptin phosphate (Januvia) 25 mg tablet Take 1 tablet (25 mg) by mouth once daily. 30 tablet 3     Allergies   Allergen Reactions    Codeine Unknown     \"Feel like I'm floating\"    Could not speak did not feel good    lightheaded    Hydrocodone Other    Hydrocodone-Acetaminophen Unknown     \"I went into shock\" \" States she didn't know where she was after taking 1 dose.    Other " "reaction(s): Other (See Comments), Other: See Comments, shock   Went into shock   \"I went into shock\" \" States she didn't know where she was after taking 1 dose.    Went into shock    Meperidine (Pf) Unknown    Tramadol Other, Unknown and Itching    Piperacillin-Tazobactam Diarrhea and Headache    Adhesive Tape-Silicones Other    Meperidine Unknown and Nausea And Vomiting     Could talk did not feel well     Social History     Socioeconomic History    Marital status:      Spouse name: Not on file    Number of children: Not on file    Years of education: Not on file    Highest education level: Not on file   Occupational History    Not on file   Tobacco Use    Smoking status: Never    Smokeless tobacco: Never   Vaping Use    Vaping status: Never Used   Substance and Sexual Activity    Alcohol use: Not Currently    Drug use: Never    Sexual activity: Not Currently   Other Topics Concern    Not on file   Social History Narrative    Not on file     Social Determinants of Health     Financial Resource Strain: Low Risk  (5/20/2024)    Overall Financial Resource Strain (CARDIA)     Difficulty of Paying Living Expenses: Not hard at all   Food Insecurity: No Food Insecurity (4/16/2024)    Hunger Vital Sign     Worried About Running Out of Food in the Last Year: Never true     Ran Out of Food in the Last Year: Never true   Transportation Needs: No Transportation Needs (5/20/2024)    PRAPARE - Transportation     Lack of Transportation (Medical): No     Lack of Transportation (Non-Medical): No   Physical Activity: Not on file   Stress: Not on file   Social Connections: Feeling Somewhat Isolated (2/20/2024)    OASIS : Social Isolation     Frequency of experiencing loneliness or isolation: Sometimes   Intimate Partner Violence: Not on file   Housing Stability: Low Risk  (5/20/2024)    Housing Stability Vital Sign     Unable to Pay for Housing in the Last Year: No     Number of Places Lived in the Last Year: 1     " "Unstable Housing in the Last Year: No       Review of Systems  Constitutional: +unintentional weight loss, no fatigue, night-sweats  HEENT: No changes in vision, nasal drainage, headache  CV: No palpitations, no chest pain, no lower extremity oedema  Resp: No wheezing, no cough, no shortness of breath  GI: No nausea, vomiting, diarrhoea, constipation  : No dysuria, no increased frequency  Neuro: No weakness, confusion, numbness, dizziness  MSK: No weakness, arthralgias, myalgias  Haem: No easy bruising, no easy bleeding  Skin: No new lesions or rashes  Endocrine: No polydipsia, polyuria, heat/cold insensitivity     Physical Exam  /70 (BP Location: Right arm, Patient Position: Lying)   Pulse 106   Temp 37.3 °C (99.1 °F) (Temporal)   Resp 18   Ht 1.549 m (5' 1\")   Wt 72.7 kg (160 lb 4.4 oz)   SpO2 94%   BMI 30.28 kg/m²   NAD, jaundice  RRR  Resp soft  Abd soft  RUQ PCT with dried bilious drainage within tubing, bag is empty  WWP    Results for orders placed or performed during the hospital encounter of 05/19/24 (from the past 24 hour(s))   Troponin I, High Sensitivity   Result Value Ref Range    Troponin I, High Sensitivity 92 (HH) 0 - 13 ng/L   POCT GLUCOSE   Result Value Ref Range    POCT Glucose 124 (H) 74 - 99 mg/dL   Troponin I, High Sensitivity   Result Value Ref Range    Troponin I, High Sensitivity 98 (HH) 0 - 13 ng/L   CBC   Result Value Ref Range    WBC 5.7 4.4 - 11.3 x10*3/uL    nRBC 0.0 0.0 - 0.0 /100 WBCs    RBC 2.10 (L) 4.00 - 5.20 x10*6/uL    Hemoglobin 6.4 (LL) 12.0 - 16.0 g/dL    Hematocrit 19.8 (L) 36.0 - 46.0 %    MCV 94 80 - 100 fL    MCH 30.5 26.0 - 34.0 pg    MCHC 32.3 32.0 - 36.0 g/dL    RDW 22.8 (H) 11.5 - 14.5 %    Platelets 185 150 - 450 x10*3/uL   Comprehensive metabolic panel   Result Value Ref Range    Glucose 120 (H) 74 - 99 mg/dL    Sodium 137 136 - 145 mmol/L    Potassium 3.9 3.5 - 5.3 mmol/L    Chloride 103 98 - 107 mmol/L    Bicarbonate 27 21 - 32 mmol/L    Anion Gap 11 " 10 - 20 mmol/L    Urea Nitrogen 35 (H) 6 - 23 mg/dL    Creatinine 2.57 (H) 0.50 - 1.05 mg/dL    eGFR 18 (L) >60 mL/min/1.73m*2    Calcium 9.3 8.6 - 10.3 mg/dL    Albumin 3.2 (L) 3.4 - 5.0 g/dL    Alkaline Phosphatase 270 (H) 33 - 136 U/L    Total Protein 5.6 (L) 6.4 - 8.2 g/dL     (H) 9 - 39 U/L    Bilirubin, Total 2.9 (H) 0.0 - 1.2 mg/dL     (H) 7 - 45 U/L   Lipase   Result Value Ref Range    Lipase 32 9 - 82 U/L   Bilirubin, Direct   Result Value Ref Range    Bilirubin, Direct 1.6 (H) 0.0 - 0.3 mg/dL   Prepare RBC: 1 Units   Result Value Ref Range    PRODUCT CODE S7360Y55     Unit Number [id number]     Unit ABO A     Unit RH POS     XM INTEP COMP     Dispense Status IS     Blood Expiration Date June 06, 2024 23:59 EDT     PRODUCT BLOOD TYPE      UNIT VOLUME 266    Type and screen   Result Value Ref Range    ABO TYPE A     Rh TYPE POS     ANTIBODY SCREEN NEG    POCT GLUCOSE   Result Value Ref Range    POCT Glucose 98 74 - 99 mg/dL   Protime-INR   Result Value Ref Range    Protime 17.3 (H) 9.8 - 12.8 seconds    INR 1.5 (H) 0.9 - 1.1   APTT   Result Value Ref Range    aPTT 32 27 - 38 seconds   POCT GLUCOSE   Result Value Ref Range    POCT Glucose 209 (H) 74 - 99 mg/dL     *Note: Due to a large number of results and/or encounters for the requested time period, some results have not been displayed. A complete set of results can be found in Results Review.     CT abdomen pelvis wo IV contrast 05/19/2024    Narrative  STUDY:  CT Abdomen and Pelvis without IV Contrast; 05/19/2024, 1:35 PM.  INDICATION:  Signs/Symptoms:Gallbladder surgery, elevated liver enzymes and lipase  from baseline.  COMPARISON:  XR pelvis: 05/19/24, 04/23/24; IR Biliary: 04/27/24; MR abdomen:  04/14/24; US gallbladder: 04/18/24.  ACCESSION NUMBER(S):  [id number]  ORDERING CLINICIAN:  CELIA WYNNE  TECHNIQUE:  CT of the abdomen and pelvis was performed.  Contiguous axial images  were obtained at 3 mm slice thickness through the  abdomen and pelvis.  Coronal and sagittal reconstructions at 3 mm slice thickness were  performed. No intravenous contrast was administered.  Automated mA/kV exposure control was utilized and patient examination  was performed in strict accordance with principles of ALARA.  FINDINGS:  Please note that the evaluation of vessels, lymph nodes and organs is  limited without intravenous contrast.    LOWER CHEST:  No cardiomegaly.  No pericardial effusion.  Atelectatic changes are  noted.  Venous catheter terminates in the RIGHT atrium.  Atherosclerosis of the thoracic aorta and coronary arteries is  present.    ABDOMEN:    LIVER:  No hepatomegaly.  Smooth surface contour.  Normal attenuation.    BILE DUCTS:  No intrahepatic or extrahepatic biliary ductal dilatation.    GALLBLADDER:  Percutaneously placed drainage tube noted within the maria isabel hepatis.  This appears to be a colostomy tube with decompressed gallbladder  versus tubing status post cholecystectomy with residual fluid within  the gallbladder fossa.  Clinical correlation for operative history  recommended.  STOMACH:  No abnormalities identified.    PANCREAS:  No masses or ductal dilatation.    SPLEEN:  No splenomegaly or focal splenic lesion.    ADRENAL GLANDS:  No thickening or nodules.    KIDNEYS AND URETERS:  Kidneys are normal in size and location.  No renal or ureteral  calculi.    PELVIS:    BLADDER:  No abnormalities identified.    REPRODUCTIVE ORGANS:  Patient is status post hysterectomy. Scattered phleboliths are present  within the pelvis.    BOWEL:  No abnormalities identified.    VESSELS:  No abnormalities identified.  Atherosclerosis of the abdominal aorta  and iliac arteries is noted.    PERITONEUM/RETROPERITONEUM/LYMPH NODES:  No free fluid.  No pneumoperitoneum.  No lymphadenopathy.    ABDOMINAL WALL:  No abnormalities identified.  Midline incision scar noted.  SOFT TISSUES:  No abnormalities identified.    BONES:  Patient is status post RIGHT  femoral ORIF.  Patient is status post  multilevel posterior fusion of L2-L5.  Intervertebral disc spacers at  L5-S1 also noted.  Dextroconvex scoliosis of the lumbar spine noted.  Moderate degenerative change of the hips demonstrated. Senescent  changes are present within the lumbar spine.  No acute fracture or  aggressive osseous lesion.    Impression  Percutaneous drainage tube noted within the maria isabel hepatis. This  appears to be related to a decompressed gallbladder versus positioning  status post cholecystectomy with residual fluid within the gallbladder  fossa. Clinical correlation for operative history recommended.  Signed by Abhi Godoy II, MD    US gallbladder 05/19/2024    Narrative  STUDY:  Right upper quadrant Ultrasound; 5/19/2024 4:28 PM  INDICATION:  Status post cholecystostomy, elevated LFTs.  COMPARISON:  CT A&P today, US gallbladder 4/18/2024, MR abdomen 4/18/2024.  ACCESSION NUMBER(S):  [id number]  ORDERING CLINICIAN:  CELIA WYNNE  TECHNIQUE:  Ultrasound of the right upper quadrant.  Multiple images of the right  upper quadrant were obtained.  FINDINGS:  The liver demonstrates normal echogenicity.    The gallbladder contains sludge as well as multiple stones.  There is  edematous wall thickening.  The cholecystectomy tube is visualized  within the gallbladder.  Sonographic Luna's sign is negative.    The common bile duct measures 0.3 cm.  There is no intrahepatic  biliary dilatation.    The pancreas is not well seen due to overlying bowel gas.    The right kidney measures 9.8 cm in length.  Renal cortical thinning  is present and echotexture is increased.  There is no hydronephrosis.  There are no stones.  There is a simple cyst measuring 1.2 x 1.0 x 0.8  cm within the midportion.    Impression  Sludge and stones within the gallbladder with edematous wall  thickening.  No biliary dilatation is appreciated.  Increased echogenicity and thinning of the right renal cortex  consistent with  intrinsic renal disease.  No hydronephrosis.  Signed by Lonnie Rodriguez MD     Assessment/Plan   79F with chronic cholecystitis, elevated liver enzymes, hyperbilirubinaemia (largely direct)  - trend LFTs  - GI consult  - routine flushes through drain at least bid  - care as per primary    I spent 45 minutes in the professional and overall care of this patient.      Ronald Grimes MD

## 2024-05-20 NOTE — CONSULTS
Inpatient consult to Cardiology  Consult performed by: Tanmay Alexander MD  Consult ordered by: Kate Edmond MD  Reason for consult: Elevated troponin abnormal EKG.        History Of Present Illness:    Tana Hargrove is a 79 y.o. female presenting with fall after rolling out of bed.    She has a history of presumed coronary artery disease based on abnormal stress test although no formal cardiac cath was performed.    She endorses no chest pain or any new symptoms.  Unclear why cardiac enzymes were evaluated they were abnormal we were consulted.  Personally reviewed EKG which is sinus rhythm and within normal limits.    Nuclear stress done 4/8/2022 showed a small fixed perfusion defect in the mid anterior/apical wall with associated wall motion abnormality  suggestive of prior infarct, calculated ejection fraction of 50% with hypokinesis of the mid anterior wall.  Echocardiogram done 4/6/2022 showed an ejection fraction of 55%, grade 2 diastolic dysfunction, low normal right ventricular systolic function,  1-2+ mitral valve regurgitation, 1+ tricuspid valve regurgitation.       Past medical history: Myelodysplastic syndrome with associated anemia, hypertension, dyslipidemia, diabetes mellitus, CKD.  COPD, ESRD on hemodialysis, type 2 diabetes mellitus, gout, neuropathy, recurrent hospital admissions.    Past surgical history: Bilateral knee surgery, hysterectomy, foot surgery, back surgery, permacath, failed laparoscopic cholecystectomy with a Lyndsay tube placement eventually.    Family history: Father with coronary artery disease, mother with heart failure      Social history: Patient denies tobacco, alcohol, or illicit drug use.        Last Recorded Vitals:  Vitals:    05/20/24 1122 05/20/24 1310 05/20/24 1342 05/20/24 1647   BP: 129/70 149/63     BP Location: Right arm      Patient Position: Lying      Pulse: 106 83 83 99   Resp: 18 18     Temp: 37.3 °C (99.1 °F) 37.6 °C (99.7 °F) (!) 37.9 °C (100.3 °F)  37.6 °C (99.6 °F)   TempSrc: Temporal Temporal Temporal Temporal   SpO2: 94%      Weight:       Height:           Last Labs:  CBC - 5/20/2024:  4:34 AM  5.7 6.4 185    19.8      CMP - 5/20/2024:  4:34 AM  9.3 5.6 117 --- 2.9   4.3 3.2 187 270      PTT - 5/20/2024: 10:02 AM  1.5   17.3 32     Troponin I, High Sensitivity   Date/Time Value Ref Range Status   05/19/2024 09:10 PM 98 (HH) 0 - 13 ng/L Final     Comment:     Previous result verified on 5/19/2024 1336 on specimen/case [id number] called with component TRPHS for procedure Troponin, High Sensitivity, 1 Hour with value 63 ng/L.   05/19/2024 06:26 PM 92 (HH) 0 - 13 ng/L Final     Comment:     Previous result verified on 5/19/2024 1336 on specimen/case [id number] called with component TRPHS for procedure Troponin, High Sensitivity, 1 Hour with value 63 ng/L.   05/19/2024 12:50 PM 63 (HH) 0 - 13 ng/L Final     BNP   Date/Time Value Ref Range Status   04/22/2024 04:01  (H) 0 - 99 pg/mL Final   04/13/2024 01:33  (H) 0 - 99 pg/mL Final     Hemoglobin A1C   Date/Time Value Ref Range Status   03/14/2024 06:28 AM 6.3 (H) 4.3 - 5.6 % Final     Comment:     American Diabetes Association guidelines indicate that patients with HgbA1c in the range 5.7-6.4% are at increased risk for development of diabetes, and intervention by lifestyle modification may be beneficial. HgbA1c greater or equal to 6.5% is considered diagnostic of diabetes.   02/26/2024 09:46 AM 6.6 (H) see below % Final   09/26/2022 10:24 AM 5.9 (A) % Final     Comment:          Diagnosis of Diabetes-Adults   Non-Diabetic: < or = 5.6%   Increased risk for developing diabetes: 5.7-6.4%   Diagnostic of diabetes: > or = 6.5%  .       Monitoring of Diabetes                Age (y)     Therapeutic Goal (%)   Adults:          >18           <7.0   Pediatrics:    13-18           <7.5                   7-12           <8.0                   0- 6            7.5-8.5   American Diabetes Association.  Diabetes Care 33(S1), Jan 2010.     04/14/2022 02:10 PM 7.3 (A) % Final     Comment:          Diagnosis of Diabetes-Adults   Non-Diabetic: < or = 5.6%   Increased risk for developing diabetes: 5.7-6.4%   Diagnostic of diabetes: > or = 6.5%  .       Monitoring of Diabetes                Age (y)     Therapeutic Goal (%)   Adults:          >18           <7.0   Pediatrics:    13-18           <7.5                   7-12           <8.0                   0- 6            7.5-8.5   American Diabetes Association. Diabetes Care 33(S1), Jan 2010.     03/06/2021 01:52 AM 5.8 (A) % Final     Comment:     Normal less than 5.7%  Prediabetes 5.7% to 6.4%  Diabetes 6.5% or higher      --HgbA1C levels may not be accurate in patients who have  renal disease, received recent blood transfusions, are anemic,  or who have dyshemoglobinemia.     VLDL   Date/Time Value Ref Range Status   05/19/2023 11:17 AM 15 0 - 40 mg/dL Final   09/26/2022 10:24 AM 22 0 - 40 mg/dL Final   04/15/2022 10:27 AM 9 0 - 40 mg/dL Final      Last I/O:  No intake/output data recorded.    Past Cardiology Tests (Last 3 Years):  EKG:  ECG 12 lead 05/19/2024 (Preliminary)      ECG 12 lead 04/22/2024      ECG 12 lead 04/18/2024      Electrocardiogram, 12-lead PRN ACS symptoms 04/14/2024      ECG 12 lead 04/13/2024      ECG 12 lead 02/25/2024      ECG 12 lead 02/14/2024      ECG 12 lead 02/07/2024    Echo:  Transthoracic Echo (TTE) Complete 02/15/2024    Ejection Fractions:  EF   Date/Time Value Ref Range Status   02/15/2024 02:13 PM 67 %      Cath:  No results found for this or any previous visit from the past 1095 days.    Stress Test:  Nuclear Stress Test 04/08/2022    Cardiac Imaging:  No results found for this or any previous visit from the past 1095 days.      Past Medical History:  She has a past medical history of Abnormal levels of other serum enzymes, Acute kidney failure (CMS-HCC), Anemia, CKD (chronic kidney disease), COPD (chronic obstructive pulmonary  disease) (Multi), Coronary artery disease, Disease of blood and blood-forming organs, unspecified, HLD (hyperlipidemia), Hypertension, MDS (myelodysplastic syndrome) (Multi), Personal history of other diseases of the musculoskeletal system and connective tissue, Personal history of other specified conditions, Personal history of other specified conditions, and Type 2 diabetes mellitus (Multi).    She has no past medical history of Adverse effect of anesthesia, Arthritis, Asthma ([facility]-HCC), Awareness under anesthesia, CHF (congestive heart failure) (Multi), Delayed emergence from general anesthesia, Disease of thyroid gland, Hard to intubate, History of transfusion, Malignant hyperthermia, PONV (postoperative nausea and vomiting), Pseudocholinesterase deficiency, Spinal headache, or Stroke (Multi).    Past Surgical History:  She has a past surgical history that includes Other surgical history (12/13/2019); Other surgical history (12/13/2019); Other surgical history (Bilateral, 12/13/2019); Other surgical history (Left, 12/13/2019); Other surgical history (12/13/2019); Other surgical history (12/13/2019); Other surgical history (Right, 12/13/2019); Other surgical history (04/16/2021); MR angio head wo IV contrast (11/20/2020); MR angio neck wo IV contrast (11/20/2020); Breast biopsy (Left); Hysterectomy; Breast lumpectomy; Appendectomy; Colonoscopy; Oophorectomy; and Joint replacement.      Social History:  She reports that she has never smoked. She has never used smokeless tobacco. She reports that she does not currently use alcohol. She reports that she does not use drugs.    Family History:  No family history on file.     Allergies:  Codeine, Hydrocodone, Hydrocodone-acetaminophen, Meperidine (pf), Tramadol, Piperacillin-tazobactam, Adhesive tape-silicones, and Meperidine    Inpatient Medications:  Scheduled medications   Medication Dose Route Frequency    amLODIPine  5 mg oral Daily    cefTRIAXone  1 g intravenous  q24h    [Held by provider] heparin (porcine)  5,000 Units subcutaneous q8h JOSE DE JESUS    [START ON 5/21/2024] heparin  2,000 Units intra-catheter After Dialysis    [START ON 5/21/2024] heparin  2,000 Units intra-catheter After Dialysis    insulin lispro  0-5 Units subcutaneous TID    metoprolol tartrate  25 mg oral BID    metroNIDAZOLE  500 mg intravenous q8h    pantoprazole  40 mg oral Daily before breakfast    Or    pantoprazole  40 mg intravenous Daily before breakfast    pravastatin  40 mg oral Nightly    pregabalin  100 mg oral BID    vancomycin  1,750 mg intravenous Once    [START ON 5/22/2024] vancomycin  750 mg intravenous Every Mon/Wed/Fri     PRN medications   Medication    acetaminophen    bisacodyl    dextrose    dextrose    glucagon    glucagon    guaiFENesin    melatonin    ondansetron    Or    ondansetron    vancomycin     Continuous Medications   Medication Dose Last Rate     Outpatient Medications:  Current Outpatient Medications   Medication Instructions    allopurinol (ZYLOPRIM) 100 mg, oral, Daily    cholecalciferol (Vitamin D-3) 50 MCG (2000 UT) tablet 1 tablet, oral, Daily    cyanocobalamin (Vitamin B-12) 1,000 mcg tablet 1 tablet, oral, Daily    metoprolol tartrate (LOPRESSOR) 25 mg, oral, 2 times daily    pioglitazone (ACTOS) 15 mg, oral, Daily    pravastatin (PRAVACHOL) 40 mg, oral, Nightly    pregabalin (Lyrica) 100 mg capsule TAKE ONE CAPSULE BY MOUTH TWICE DAILY @9AM & 5PM    sevelamer carbonate (RENVELA) 800 mg, oral, 3 times daily (morning, midday, late afternoon), Swallow tablet whole; do not crush, break, or chew.    SITagliptin phosphate (JANUVIA) 25 mg, oral, Daily       Physical Exam:  Physical Exam  Vitals reviewed.   Constitutional:       Appearance: Normal appearance.   Neck:      Vascular: No carotid bruit or JVD.   Cardiovascular:      Rate and Rhythm: Normal rate and regular rhythm.      Pulses: Normal pulses.      Heart sounds: Normal heart sounds, S1 normal and S2 normal.    Pulmonary:      Effort: Pulmonary effort is normal. No respiratory distress.      Breath sounds: No wheezing or rales.   Abdominal:      General: Abdomen is flat.      Palpations: Abdomen is soft.   Musculoskeletal:      Right lower leg: No edema.      Left lower leg: No edema.   Skin:     General: Skin is warm.   Neurological:      Mental Status: She is alert and oriented to person, place, and time. Mental status is at baseline.   Psychiatric:         Mood and Affect: Mood normal.         Behavior: Behavior normal.           Assessment/Plan   1-elevated high-sensitivity troponin-unknown significance and patient without ischemic symptoms.    2-presumed coronary artery disease-secondary prevention with medical therapy.    Cardiology will sign off please call with questions.  Peripheral IV 05/19/24 20 G Left Antecubital (Active)   Site Assessment Clean;Dry;Intact 05/20/24 0629   Dressing Type Transparent 05/20/24 0629   Dressing Status Clean;Dry 05/20/24 0629   Number of days: 1       Hemodialysis Cath Right Subclavian (Active)   Line Necessity Hemodialysis 05/20/24 1647   Site Assessment Clean;Dry;Intact 05/20/24 1647   Cap Change Cap changed 05/20/24 1647   Dressing Type Transparent 05/20/24 1647   Dressing Status Clean;Dry 05/20/24 1647   Dressing Intervention New dressing 05/20/24 1647   Dressing Change Due 05/27/24 05/20/24 1647   Number of days:        Code Status:  DNR and No Intubation          Tanmay Alexander MD

## 2024-05-21 ENCOUNTER — APPOINTMENT (OUTPATIENT)
Dept: CARDIOLOGY | Facility: HOSPITAL | Age: 79
End: 2024-05-21
Payer: MEDICARE

## 2024-05-21 LAB
ALBUMIN SERPL BCP-MCNC: 3.4 G/DL (ref 3.4–5)
ALP SERPL-CCNC: 255 U/L (ref 33–136)
ALT SERPL W P-5'-P-CCNC: 114 U/L (ref 7–45)
ANION GAP SERPL CALC-SCNC: 10 MMOL/L (ref 10–20)
AST SERPL W P-5'-P-CCNC: 45 U/L (ref 9–39)
BASO STIPL BLD QL SMEAR: PRESENT
BASOPHILS # BLD AUTO: 0.02 X10*3/UL (ref 0–0.1)
BASOPHILS NFR BLD AUTO: 0.6 %
BILIRUB SERPL-MCNC: 1 MG/DL (ref 0–1.2)
BUN SERPL-MCNC: 15 MG/DL (ref 6–23)
CALCIUM SERPL-MCNC: 9 MG/DL (ref 8.6–10.3)
CHLORIDE SERPL-SCNC: 98 MMOL/L (ref 98–107)
CO2 SERPL-SCNC: 29 MMOL/L (ref 21–32)
CREAT SERPL-MCNC: 2.06 MG/DL (ref 0.5–1.05)
EGFRCR SERPLBLD CKD-EPI 2021: 24 ML/MIN/1.73M*2
EOSINOPHIL # BLD AUTO: 0.22 X10*3/UL (ref 0–0.4)
EOSINOPHIL NFR BLD AUTO: 6.1 %
ERYTHROCYTE [DISTWIDTH] IN BLOOD BY AUTOMATED COUNT: 26.7 % (ref 11.5–14.5)
GIANT PLATELETS BLD QL SMEAR: NORMAL
GLUCOSE BLD MANUAL STRIP-MCNC: 152 MG/DL (ref 74–99)
GLUCOSE BLD MANUAL STRIP-MCNC: 165 MG/DL (ref 74–99)
GLUCOSE BLD MANUAL STRIP-MCNC: 201 MG/DL (ref 74–99)
GLUCOSE BLD MANUAL STRIP-MCNC: 210 MG/DL (ref 74–99)
GLUCOSE SERPL-MCNC: 190 MG/DL (ref 74–99)
HCT VFR BLD AUTO: 23.9 % (ref 36–46)
HGB BLD-MCNC: 8.1 G/DL (ref 12–16)
HYPOCHROMIA BLD QL SMEAR: NORMAL
IMM GRANULOCYTES # BLD AUTO: 0.03 X10*3/UL (ref 0–0.5)
IMM GRANULOCYTES NFR BLD AUTO: 0.8 % (ref 0–0.9)
LYMPHOCYTES # BLD AUTO: 0.54 X10*3/UL (ref 0.8–3)
LYMPHOCYTES NFR BLD AUTO: 14.9 %
MAGNESIUM SERPL-MCNC: 1.64 MG/DL (ref 1.6–2.4)
MCH RBC QN AUTO: 30.2 PG (ref 26–34)
MCHC RBC AUTO-ENTMCNC: 33.9 G/DL (ref 32–36)
MCV RBC AUTO: 89 FL (ref 80–100)
MONOCYTES # BLD AUTO: 0.41 X10*3/UL (ref 0.05–0.8)
MONOCYTES NFR BLD AUTO: 11.3 %
NEUTROPHILS # BLD AUTO: 2.41 X10*3/UL (ref 1.6–5.5)
NEUTROPHILS NFR BLD AUTO: 66.3 %
NRBC BLD-RTO: 0.6 /100 WBCS (ref 0–0)
OVALOCYTES BLD QL SMEAR: NORMAL
PLATELET # BLD AUTO: 189 X10*3/UL (ref 150–450)
POLYCHROMASIA BLD QL SMEAR: NORMAL
POTASSIUM SERPL-SCNC: 2.9 MMOL/L (ref 3.5–5.3)
PROT SERPL-MCNC: 6 G/DL (ref 6.4–8.2)
RBC # BLD AUTO: 2.68 X10*6/UL (ref 4–5.2)
RBC MORPH BLD: NORMAL
SODIUM SERPL-SCNC: 134 MMOL/L (ref 136–145)
WBC # BLD AUTO: 3.6 X10*3/UL (ref 4.4–11.3)

## 2024-05-21 PROCEDURE — 82947 ASSAY GLUCOSE BLOOD QUANT: CPT | Mod: 91

## 2024-05-21 PROCEDURE — 2500000001 HC RX 250 WO HCPCS SELF ADMINISTERED DRUGS (ALT 637 FOR MEDICARE OP): Performed by: STUDENT IN AN ORGANIZED HEALTH CARE EDUCATION/TRAINING PROGRAM

## 2024-05-21 PROCEDURE — 2500000004 HC RX 250 GENERAL PHARMACY W/ HCPCS (ALT 636 FOR OP/ED): Performed by: INTERNAL MEDICINE

## 2024-05-21 PROCEDURE — 2500000006 HC RX 250 W HCPCS SELF ADMINISTERED DRUGS (ALT 637 FOR ALL PAYERS): Performed by: INTERNAL MEDICINE

## 2024-05-21 PROCEDURE — 2060000001 HC INTERMEDIATE ICU ROOM DAILY

## 2024-05-21 PROCEDURE — 80053 COMPREHEN METABOLIC PANEL: CPT | Performed by: INTERNAL MEDICINE

## 2024-05-21 PROCEDURE — 83735 ASSAY OF MAGNESIUM: CPT | Performed by: INTERNAL MEDICINE

## 2024-05-21 PROCEDURE — 99231 SBSQ HOSP IP/OBS SF/LOW 25: CPT | Performed by: SURGERY

## 2024-05-21 PROCEDURE — 85025 COMPLETE CBC W/AUTO DIFF WBC: CPT | Performed by: INTERNAL MEDICINE

## 2024-05-21 PROCEDURE — 2500000002 HC RX 250 W HCPCS SELF ADMINISTERED DRUGS (ALT 637 FOR MEDICARE OP, ALT 636 FOR OP/ED): Performed by: STUDENT IN AN ORGANIZED HEALTH CARE EDUCATION/TRAINING PROGRAM

## 2024-05-21 PROCEDURE — 99233 SBSQ HOSP IP/OBS HIGH 50: CPT | Performed by: INTERNAL MEDICINE

## 2024-05-21 PROCEDURE — A4217 STERILE WATER/SALINE, 500 ML: HCPCS | Performed by: SURGERY

## 2024-05-21 PROCEDURE — 2500000004 HC RX 250 GENERAL PHARMACY W/ HCPCS (ALT 636 FOR OP/ED): Performed by: SURGERY

## 2024-05-21 PROCEDURE — 36415 COLL VENOUS BLD VENIPUNCTURE: CPT | Performed by: INTERNAL MEDICINE

## 2024-05-21 RX ORDER — SODIUM CHLORIDE 0.9 G/100ML
10 IRRIGANT IRRIGATION 2 TIMES DAILY
Status: DISCONTINUED | OUTPATIENT
Start: 2024-05-21 | End: 2024-05-28 | Stop reason: HOSPADM

## 2024-05-21 RX ORDER — POTASSIUM CHLORIDE 750 MG/1
40 TABLET, FILM COATED, EXTENDED RELEASE ORAL ONCE
Status: COMPLETED | OUTPATIENT
Start: 2024-05-21 | End: 2024-05-21

## 2024-05-21 RX ADMIN — METOPROLOL TARTRATE 25 MG: 25 TABLET, FILM COATED ORAL at 09:03

## 2024-05-21 RX ADMIN — PANTOPRAZOLE SODIUM 40 MG: 40 TABLET, DELAYED RELEASE ORAL at 06:06

## 2024-05-21 RX ADMIN — HEPARIN SODIUM 5000 UNITS: 5000 INJECTION INTRAVENOUS; SUBCUTANEOUS at 23:03

## 2024-05-21 RX ADMIN — METRONIDAZOLE 500 MG: 500 INJECTION, SOLUTION INTRAVENOUS at 17:29

## 2024-05-21 RX ADMIN — AMLODIPINE BESYLATE 5 MG: 5 TABLET ORAL at 08:59

## 2024-05-21 RX ADMIN — METOPROLOL TARTRATE 25 MG: 25 TABLET, FILM COATED ORAL at 19:57

## 2024-05-21 RX ADMIN — SODIUM CHLORIDE 10 ML: 900 IRRIGANT IRRIGATION at 11:45

## 2024-05-21 RX ADMIN — INSULIN LISPRO 2 UNITS: 100 INJECTION, SOLUTION INTRAVENOUS; SUBCUTANEOUS at 13:47

## 2024-05-21 RX ADMIN — PRAVASTATIN SODIUM 40 MG: 40 TABLET ORAL at 19:57

## 2024-05-21 RX ADMIN — POTASSIUM CHLORIDE 40 MEQ: 750 TABLET, FILM COATED, EXTENDED RELEASE ORAL at 11:06

## 2024-05-21 RX ADMIN — PREGABALIN 100 MG: 25 CAPSULE ORAL at 08:59

## 2024-05-21 RX ADMIN — INSULIN LISPRO 1 UNITS: 100 INJECTION, SOLUTION INTRAVENOUS; SUBCUTANEOUS at 17:36

## 2024-05-21 RX ADMIN — CEFTRIAXONE SODIUM 1 G: 1 INJECTION, SOLUTION INTRAVENOUS at 18:00

## 2024-05-21 RX ADMIN — METRONIDAZOLE 500 MG: 500 INJECTION, SOLUTION INTRAVENOUS at 11:06

## 2024-05-21 RX ADMIN — METRONIDAZOLE 500 MG: 500 INJECTION, SOLUTION INTRAVENOUS at 23:03

## 2024-05-21 RX ADMIN — PREGABALIN 100 MG: 25 CAPSULE ORAL at 19:56

## 2024-05-21 RX ADMIN — METRONIDAZOLE 500 MG: 500 INJECTION, SOLUTION INTRAVENOUS at 02:52

## 2024-05-21 RX ADMIN — ACETAMINOPHEN 650 MG: 325 TABLET ORAL at 02:55

## 2024-05-21 RX ADMIN — SODIUM CHLORIDE 10 ML: 900 IRRIGANT IRRIGATION at 19:47

## 2024-05-21 ASSESSMENT — COGNITIVE AND FUNCTIONAL STATUS - GENERAL
HELP NEEDED FOR BATHING: A LITTLE
DAILY ACTIVITIY SCORE: 19
PERSONAL GROOMING: A LITTLE
DRESSING REGULAR UPPER BODY CLOTHING: A LITTLE
TOILETING: A LITTLE
MOVING TO AND FROM BED TO CHAIR: A LITTLE
WALKING IN HOSPITAL ROOM: A LITTLE
CLIMB 3 TO 5 STEPS WITH RAILING: A LITTLE
STANDING UP FROM CHAIR USING ARMS: A LITTLE
MOBILITY SCORE: 20
DRESSING REGULAR LOWER BODY CLOTHING: A LITTLE

## 2024-05-21 ASSESSMENT — PAIN SCALES - GENERAL
PAINLEVEL_OUTOF10: 0 - NO PAIN
PAINLEVEL_OUTOF10: 3

## 2024-05-21 ASSESSMENT — PAIN - FUNCTIONAL ASSESSMENT
PAIN_FUNCTIONAL_ASSESSMENT: 0-10

## 2024-05-21 NOTE — CARE PLAN
The patient's goals for the shift include  rest and safety.    The clinical goals for the shift include pt will remain free from falls/injuries,     Over the shift, the patient did make progress towards goals.  All needs met, safety maintained.

## 2024-05-21 NOTE — CARE PLAN
Problem: Pain - Adult  Goal: Verbalizes/displays adequate comfort level or baseline comfort level  Outcome: Progressing     Problem: Safety - Adult  Goal: Free from fall injury  Outcome: Progressing     Problem: Discharge Planning  Goal: Discharge to home or other facility with appropriate resources  Outcome: Progressing     Problem: Chronic Conditions and Co-morbidities  Goal: Patient's chronic conditions and co-morbidity symptoms are monitored and maintained or improved  Outcome: Progressing     Problem: Diabetes  Goal: Achieve decreasing blood glucose levels by end of shift  Outcome: Progressing  Goal: Increase stability of blood glucose readings by end of shift  Outcome: Progressing  Goal: Decrease in ketones present in urine by end of shift  Outcome: Progressing  Goal: Maintain electrolyte levels within acceptable range throughout shift  Outcome: Progressing  Goal: Maintain glucose levels >70mg/dl to <250mg/dl throughout shift  Outcome: Progressing  Goal: No changes in neurological exam by end of shift  Outcome: Progressing  Goal: Learn about and adhere to nutrition recommendations by end of shift  Outcome: Progressing  Goal: Vital signs within normal range for age by end of shift  Outcome: Progressing  Goal: Increase self care and/or family involovement by end of shift  Outcome: Progressing  Goal: Receive DSME education by end of shift  Outcome: Progressing   The patient's goals for the shift include      The clinical goals for the shift include pt will remain free from falls/injuries

## 2024-05-21 NOTE — PROGRESS NOTES
Tana Hargrove is a 79 y.o. female on day 1 of admission presenting with Fall, initial encounter.      Subjective   Tana Hargrove is a 79 y.o. female with PMHx s/f CAD, HTN, COPD, ESRD on HD via permacath, DM2, MDS, gout, neuropathy presenting for a fall. Pt lives at home and admits she rolled out of bed onto the floor this morning. Denies head injury or LOC. She has been admitted 6 times this year already for multiple issues. Most recently last admission she had a cholecystostomy tube placed for chronic cholecystitis. She also had an ex lap to remove adhesions between her liver and anterior abdominal wall that were encasing her gallbladder. She denies any other real symptoms at this time. No worsening abdominal pain, fever, chills, issues with the cholecystostomy tube, nausea, vomiting, chest pain, or other symptoms. She has been compliant with taking her medications and with her hemodialysis schedule.     ED Course (Summary):   Vitals on presentation: 100.4 F, 98 bpm, 20 rr, 145/53 --> 180/71, 93% on RA  Labs: CMP glu 165, Cr 2.38, alk phos 292, , , total bilirubin 4.7, lipase 146, Trop 62 --> 63  CBC WBC 9.6, Hg 7.5, Plt 210  Imaging: CXR - no acute process. Agree with interpretation.  XR pelvis - no acute osseous abnormality. Agree with interpretation.  CT a/p - Percutaneous drainage tube noted within the maria isabel hepatis. This  appears to be related to a decompressed gallbladder versus positioning  status post cholecystectomy with residual fluid within the gallbladder  fossa. Clinical correlation for operative history recommended. Agree with interpretation.  C. US gallbladder - Sludge and stones within the gallbladder with edematous wall thickening. No biliary dilatation is appreciated.   Interventions: Admitted to observation for multiple lab abnormalities, mainly LFTs and bilirubin      05/20: Patient was evaluated this morning, awake and alert, denies having chest pain or shortness of breath, no  nausea or vomiting.  Liver function is improving  05/21: Patient was evaluated this a.m., denies having fever or chills, no nausea vomiting, no chest pain or shortness of breath.  Evaluated by cardiology for elevated troponin and EKG changes-recommended medical management and signed off.  Liver function gradually downtrending.          Objective     Last Recorded Vitals  /70 (BP Location: Right arm, Patient Position: Lying)   Pulse 69   Temp 36.9 °C (98.5 °F) (Temporal)   Resp 18   Wt 75.9 kg (167 lb 5.3 oz)   SpO2 96%   Intake/Output last 3 Shifts:    Intake/Output Summary (Last 24 hours) at 5/21/2024 1059  Last data filed at 5/21/2024 0350  Gross per 24 hour   Intake 1542 ml   Output 1512 ml   Net 30 ml       Admission Weight  Weight: 69.9 kg (154 lb) (05/19/24 1117)    Daily Weight  05/21/24 : 75.9 kg (167 lb 5.3 oz)    Image Results  ECG 12 lead  Sinus rhythm  Minimal ST depression, lateral leads  Prolonged QT interval      Physical Exam  Patient is awake and orient, not in apparent distress  Eyes: PERRLA, no conjunctival congestion  Chest: Bilateral Air entry, no crackles or wheezing  Heart: s1S2 regular, no murmur  Abdomen: Soft, non tender, BS present, cholecystostomy tube in place  Ext:    Relevant Results               Assessment/Plan   This patient currently has cardiac telemetry ordered; if you would like to modify or discontinue the telemetry order, click here to go to the orders activity to modify/discontinue the order.    This patient has a central line   Reason for the central line remaining today? Dialysis/Hemapheresis    Sepsis with organ dysfunction of elevated bilirubin  Patient developed fever with chills while in dialysis  Started on IV Rocephin/vancomycin after obtaining cultures  Follow septic workup  Monitor    Elevated liver enzymes  US abdomen shows sludge and stones within the gallbladder, no biliary dilatation.  Hepatitis panel: Negative  Liver function gradually  downtrending        Physical debility/deconditioning  PT/OT consulted       ESRD on hemodialysis  Renal consulted for HD management     Elevated troponins:  Patient denies having chest pain, EKG shows minimal ST depression in lateral leads  Cardiology consulted-recommended medical management and signed off.       DM2:  SSI     Normocytic anemia:  Status post 1 unit of PRBC transfusion on 05/20  Monitor H&H and transfuse as needed     HTN:  Home amlodipine, Lopressor     HLD:  Home statin     Renal diet  DVT ppx: Heparin subcutaneous  DNR arrest, no intubation     Physical debility/deconditioning\  PT/OT    Hypokalemia  Replace and monitor         Kate Edmond MD

## 2024-05-21 NOTE — PROGRESS NOTES
"      Nephrology Progress Note      Nephrology following for ESRD.   Events over night:   Has not had fevers or chills.      /72 (BP Location: Right arm, Patient Position: Lying)   Pulse 71   Temp 37.4 °C (99.3 °F) (Temporal)   Resp 18   Ht 1.549 m (5' 1\")   Wt 75.9 kg (167 lb 5.3 oz)   SpO2 96%   BMI 31.62 kg/m²     Input / Output:  24 HR:   Intake/Output Summary (Last 24 hours) at 5/21/2024 1434  Last data filed at 5/21/2024 1315  Gross per 24 hour   Intake 1400 ml   Output 1512 ml   Net -112 ml       Physical Exam   Alert and oriented x 3 NAD  Neck: no JVD  CV: RRR  Lungs: CTA bilaterally  Abd: soft, NT, ND   Ext: No lower extremity edema    Scheduled medications  amLODIPine, 5 mg, oral, Daily  cefTRIAXone, 1 g, intravenous, q24h  [Held by provider] heparin (porcine), 5,000 Units, subcutaneous, q8h JOSE DE JESUS  heparin, 2,000 Units, intra-catheter, After Dialysis  heparin, 2,000 Units, intra-catheter, After Dialysis  insulin lispro, 0-5 Units, subcutaneous, TID  metoprolol tartrate, 25 mg, oral, BID  metroNIDAZOLE, 500 mg, intravenous, q8h  pantoprazole, 40 mg, oral, Daily before breakfast   Or  pantoprazole, 40 mg, intravenous, Daily before breakfast  pravastatin, 40 mg, oral, Nightly  pregabalin, 100 mg, oral, BID  sodium chloride, 10 mL, intra-catheter, BID  [START ON 5/22/2024] vancomycin, 750 mg, intravenous, Every Mon/Wed/Fri      Continuous medications     PRN medications  PRN medications: acetaminophen, bisacodyl, dextrose, dextrose, glucagon, glucagon, guaiFENesin, melatonin, ondansetron **OR** ondansetron, vancomycin   Results from last 7 days   Lab Units 05/21/24  0939   SODIUM mmol/L 134*   POTASSIUM mmol/L 2.9*   CHLORIDE mmol/L 98   CO2 mmol/L 29   BUN mg/dL 15   CREATININE mg/dL 2.06*   CALCIUM mg/dL 9.0   PROTEIN TOTAL g/dL 6.0*   BILIRUBIN TOTAL mg/dL 1.0   ALK PHOS U/L 255*   ALT U/L 114*   AST U/L 45*   GLUCOSE mg/dL 190*      Results from last 7 days   Lab Units 05/21/24  0939   MAGNESIUM " mg/dL 1.64      Results from last 7 days   Lab Units 05/21/24  0939 05/20/24  0434 05/19/24  1148   WBC AUTO x10*3/uL 3.6* 5.7 9.6   HEMOGLOBIN g/dL 8.1* 6.4* 7.5*   HEMATOCRIT % 23.9* 19.8* 22.9*   PLATELETS AUTO x10*3/uL 189 185 210        Assessment & Plan:     Patient is 79 y.o. female history of DM 2, ESRD, hypertension who is admitted to hospital for fall. Nephrology consulted in view of ESRD.     ESRD  -Hemodialysis Monday Wednesday Friday at [facility]  -Has been compliant with dialysis     Hypertension  -Controlled    Hypokalemia     Anemia of ESRD and myelodysplastic syndrome  -She is on MARILYNN with dialysis  -Hemoglobin is 6.4  -Hemoglobin has been between 8 and 9 as an outpatient in the month of May     CKD-MBD  -Patient currently not on a phosphorus binder     Gram negative bacilli BSI  Has percutaneous cutaneous cholecystotomy drain  -Surgery following     Elevated liver enzymes     Recommendations:   -Hemodialysis tomorrow  -Will run higher potassium bath tomorrow, given p.o. potassium today  -Follow-up blood cultures  -Patient given Rocephin and IV Vanco after dialysis  -Blood cultures are growing gram-negative bacilli, likely can stop bang, need ID consult     Discussed with primary service                Please message me through [facility] chat with any questions or concerns.     Pinky Christie DO  5/21/2024  2:34 PM     [facility]    [address], [address]   [place] [address]  Office: [phone]

## 2024-05-21 NOTE — PROGRESS NOTES
Tana Hargrove is a 79 y.o. female on day 1 of admission presenting with Fall, initial encounter.    Subjective   Started on empiric Abx for elevated temp yesterday.  Workup NGTD.  Feels well.  Minimal out from drain       Objective   Heart Rate:  []   Temp:  [36.1 °C (97 °F)-38.3 °C (101 °F)]   Resp:  [17-18]   BP: (123-174)/(65-75)   Weight:  [75.9 kg (167 lb 5.3 oz)]   SpO2:  [92 %-96 %]   I/O last 3 completed shifts:  In: 1542 (20.3 mL/kg) [I.V.:800 (10.5 mL/kg); Blood:342; Other:400]  Out: 1512 (19.9 mL/kg) [Urine:100 (0 mL/kg/hr); Other:1412]  Weight: 75.9 kg   I/O this shift:  In: 800 [P.O.:500; IV Piggyback:300]  Out: -   Physical Exam  Vitals reviewed.   Abdominal:      General: There is no distension.      Palpations: Abdomen is soft.      Tenderness: There is no abdominal tenderness.      Comments: R perc chidi drain with minimal output   Skin:     General: Skin is warm.      Capillary Refill: Capillary refill takes less than 2 seconds.   Neurological:      General: No focal deficit present.      Mental Status: She is alert.   Psychiatric:         Mood and Affect: Mood normal.       Results for orders placed or performed during the hospital encounter of 05/19/24 (from the past 24 hour(s))   Blood Culture    Specimen: Dialysis; Blood culture   Result Value Ref Range    Blood Culture Loaded on Instrument - Culture in progress    Blood Culture    Specimen: Peripheral Venipuncture; Blood culture   Result Value Ref Range    Blood Culture Loaded on Instrument - Culture in progress    Blood Culture    Specimen: Peripheral Venipuncture; Blood culture   Result Value Ref Range    Blood Culture       Identification and susceptibility testing to follow    Gram Stain Gram negative bacilli (AA)    POCT GLUCOSE   Result Value Ref Range    POCT Glucose 159 (H) 74 - 99 mg/dL   POCT GLUCOSE   Result Value Ref Range    POCT Glucose 152 (H) 74 - 99 mg/dL   CBC and Auto Differential   Result Value Ref Range    WBC 3.6  (L) 4.4 - 11.3 x10*3/uL    nRBC 0.6 (H) 0.0 - 0.0 /100 WBCs    RBC 2.68 (L) 4.00 - 5.20 x10*6/uL    Hemoglobin 8.1 (L) 12.0 - 16.0 g/dL    Hematocrit 23.9 (L) 36.0 - 46.0 %    MCV 89 80 - 100 fL    MCH 30.2 26.0 - 34.0 pg    MCHC 33.9 32.0 - 36.0 g/dL    RDW 26.7 (H) 11.5 - 14.5 %    Platelets 189 150 - 450 x10*3/uL    Neutrophils % 66.3 40.0 - 80.0 %    Immature Granulocytes %, Automated 0.8 0.0 - 0.9 %    Lymphocytes % 14.9 13.0 - 44.0 %    Monocytes % 11.3 2.0 - 10.0 %    Eosinophils % 6.1 0.0 - 6.0 %    Basophils % 0.6 0.0 - 2.0 %    Neutrophils Absolute 2.41 1.60 - 5.50 x10*3/uL    Immature Granulocytes Absolute, Automated 0.03 0.00 - 0.50 x10*3/uL    Lymphocytes Absolute 0.54 (L) 0.80 - 3.00 x10*3/uL    Monocytes Absolute 0.41 0.05 - 0.80 x10*3/uL    Eosinophils Absolute 0.22 0.00 - 0.40 x10*3/uL    Basophils Absolute 0.02 0.00 - 0.10 x10*3/uL   Magnesium   Result Value Ref Range    Magnesium 1.64 1.60 - 2.40 mg/dL   Comprehensive metabolic panel   Result Value Ref Range    Glucose 190 (H) 74 - 99 mg/dL    Sodium 134 (L) 136 - 145 mmol/L    Potassium 2.9 (LL) 3.5 - 5.3 mmol/L    Chloride 98 98 - 107 mmol/L    Bicarbonate 29 21 - 32 mmol/L    Anion Gap 10 10 - 20 mmol/L    Urea Nitrogen 15 6 - 23 mg/dL    Creatinine 2.06 (H) 0.50 - 1.05 mg/dL    eGFR 24 (L) >60 mL/min/1.73m*2    Calcium 9.0 8.6 - 10.3 mg/dL    Albumin 3.4 3.4 - 5.0 g/dL    Alkaline Phosphatase 255 (H) 33 - 136 U/L    Total Protein 6.0 (L) 6.4 - 8.2 g/dL    AST 45 (H) 9 - 39 U/L    Bilirubin, Total 1.0 0.0 - 1.2 mg/dL     (H) 7 - 45 U/L   Morphology   Result Value Ref Range    RBC Morphology See Below     Polychromasia Mild     Hypochromia Mild     Ovalocytes Few     Basophilic Stippling Present     Giant Platelets Few    POCT GLUCOSE   Result Value Ref Range    POCT Glucose 201 (H) 74 - 99 mg/dL     *Note: Due to a large number of results and/or encounters for the requested time period, some results have not been displayed. A complete  set of results can be found in Results Review.           Assessment/Plan   Principal Problem:    Fall, initial encounter  Active Problems:    Anxiety    Chronic diastolic heart failure of unknown etiology (Multi)    Hypertension, essential    Stage 4 chronic kidney disease (Multi)    Gout    Repeated falls    Paroxysmal atrial fibrillation (Multi)    Normocytic anemia    Essential (primary) hypertension    HLD (hyperlipidemia)    Type 2 diabetes mellitus without complications (Multi)    ESRD (end stage renal disease) on dialysis (Multi)    Elevated troponin    Elevated bilirubin    Hyperglycemia    Elevated lipase  79F with chronic cholecystitis with perc chidi drain, LFTs trending down (shock liver when down for 4 hours?)  - routine flushing TID (5cc to drain; 5cc to patient)  - tentative OR on 6th or 7th June for elective lap, poss open, cholecystectomy  - appreciate [facility]Cards for medical optimisation  - PATs whilst in-house    I spent 35 minutes in the professional and overall care of this patient.      Ronald Grimes MD

## 2024-05-22 ENCOUNTER — APPOINTMENT (OUTPATIENT)
Dept: DIALYSIS | Facility: HOSPITAL | Age: 79
End: 2024-05-22
Payer: MEDICARE

## 2024-05-22 LAB
ALBUMIN SERPL BCP-MCNC: 3.1 G/DL (ref 3.4–5)
ALP SERPL-CCNC: 216 U/L (ref 33–136)
ALT SERPL W P-5'-P-CCNC: 75 U/L (ref 7–45)
ANION GAP SERPL CALC-SCNC: 12 MMOL/L (ref 10–20)
AST SERPL W P-5'-P-CCNC: 26 U/L (ref 9–39)
BILIRUB SERPL-MCNC: 0.7 MG/DL (ref 0–1.2)
BUN SERPL-MCNC: 25 MG/DL (ref 6–23)
CALCIUM SERPL-MCNC: 9.1 MG/DL (ref 8.6–10.3)
CHLORIDE SERPL-SCNC: 103 MMOL/L (ref 98–107)
CO2 SERPL-SCNC: 26 MMOL/L (ref 21–32)
CREAT SERPL-MCNC: 2.32 MG/DL (ref 0.5–1.05)
EGFRCR SERPLBLD CKD-EPI 2021: 21 ML/MIN/1.73M*2
ERYTHROCYTE [DISTWIDTH] IN BLOOD BY AUTOMATED COUNT: 26.3 % (ref 11.5–14.5)
GLUCOSE BLD MANUAL STRIP-MCNC: 138 MG/DL (ref 74–99)
GLUCOSE BLD MANUAL STRIP-MCNC: 160 MG/DL (ref 74–99)
GLUCOSE BLD MANUAL STRIP-MCNC: 230 MG/DL (ref 74–99)
GLUCOSE SERPL-MCNC: 120 MG/DL (ref 74–99)
HCT VFR BLD AUTO: 22.1 % (ref 36–46)
HGB BLD-MCNC: 7.4 G/DL (ref 12–16)
MAGNESIUM SERPL-MCNC: 1.72 MG/DL (ref 1.6–2.4)
MCH RBC QN AUTO: 29.4 PG (ref 26–34)
MCHC RBC AUTO-ENTMCNC: 33.5 G/DL (ref 32–36)
MCV RBC AUTO: 88 FL (ref 80–100)
NRBC BLD-RTO: 0 /100 WBCS (ref 0–0)
PLATELET # BLD AUTO: 195 X10*3/UL (ref 150–450)
POTASSIUM SERPL-SCNC: 3.6 MMOL/L (ref 3.5–5.3)
PROT SERPL-MCNC: 5.6 G/DL (ref 6.4–8.2)
RBC # BLD AUTO: 2.52 X10*6/UL (ref 4–5.2)
SODIUM SERPL-SCNC: 137 MMOL/L (ref 136–145)
WBC # BLD AUTO: 4.4 X10*3/UL (ref 4.4–11.3)

## 2024-05-22 PROCEDURE — 85027 COMPLETE CBC AUTOMATED: CPT | Performed by: INTERNAL MEDICINE

## 2024-05-22 PROCEDURE — 2500000004 HC RX 250 GENERAL PHARMACY W/ HCPCS (ALT 636 FOR OP/ED): Performed by: NURSE PRACTITIONER

## 2024-05-22 PROCEDURE — 87086 URINE CULTURE/COLONY COUNT: CPT | Mod: PORLAB | Performed by: NURSE PRACTITIONER

## 2024-05-22 PROCEDURE — 2060000001 HC INTERMEDIATE ICU ROOM DAILY

## 2024-05-22 PROCEDURE — 2500000004 HC RX 250 GENERAL PHARMACY W/ HCPCS (ALT 636 FOR OP/ED): Performed by: INTERNAL MEDICINE

## 2024-05-22 PROCEDURE — 36415 COLL VENOUS BLD VENIPUNCTURE: CPT | Performed by: INTERNAL MEDICINE

## 2024-05-22 PROCEDURE — 2500000004 HC RX 250 GENERAL PHARMACY W/ HCPCS (ALT 636 FOR OP/ED): Performed by: STUDENT IN AN ORGANIZED HEALTH CARE EDUCATION/TRAINING PROGRAM

## 2024-05-22 PROCEDURE — 80053 COMPREHEN METABOLIC PANEL: CPT | Performed by: INTERNAL MEDICINE

## 2024-05-22 PROCEDURE — 99232 SBSQ HOSP IP/OBS MODERATE 35: CPT | Performed by: INTERNAL MEDICINE

## 2024-05-22 PROCEDURE — 2500000001 HC RX 250 WO HCPCS SELF ADMINISTERED DRUGS (ALT 637 FOR MEDICARE OP): Performed by: STUDENT IN AN ORGANIZED HEALTH CARE EDUCATION/TRAINING PROGRAM

## 2024-05-22 PROCEDURE — 83735 ASSAY OF MAGNESIUM: CPT | Performed by: INTERNAL MEDICINE

## 2024-05-22 PROCEDURE — 8010000001 HC DIALYSIS - HEMODIALYSIS PER DAY

## 2024-05-22 PROCEDURE — C9113 INJ PANTOPRAZOLE SODIUM, VIA: HCPCS | Performed by: STUDENT IN AN ORGANIZED HEALTH CARE EDUCATION/TRAINING PROGRAM

## 2024-05-22 PROCEDURE — 2500000004 HC RX 250 GENERAL PHARMACY W/ HCPCS (ALT 636 FOR OP/ED): Performed by: SURGERY

## 2024-05-22 PROCEDURE — 2500000001 HC RX 250 WO HCPCS SELF ADMINISTERED DRUGS (ALT 637 FOR MEDICARE OP): Performed by: INTERNAL MEDICINE

## 2024-05-22 PROCEDURE — 82947 ASSAY GLUCOSE BLOOD QUANT: CPT

## 2024-05-22 PROCEDURE — 99233 SBSQ HOSP IP/OBS HIGH 50: CPT | Performed by: INTERNAL MEDICINE

## 2024-05-22 PROCEDURE — 87040 BLOOD CULTURE FOR BACTERIA: CPT | Mod: 91,PORLAB | Performed by: INTERNAL MEDICINE

## 2024-05-22 PROCEDURE — A4217 STERILE WATER/SALINE, 500 ML: HCPCS | Performed by: SURGERY

## 2024-05-22 RX ORDER — BUTALBITAL, ACETAMINOPHEN AND CAFFEINE 300; 40; 50 MG/1; MG/1; MG/1
1 CAPSULE ORAL EVERY 4 HOURS PRN
Status: DISCONTINUED | OUTPATIENT
Start: 2024-05-22 | End: 2024-05-22

## 2024-05-22 RX ORDER — CEFTRIAXONE 2 G/50ML
2 INJECTION, SOLUTION INTRAVENOUS EVERY 24 HOURS
Status: DISCONTINUED | OUTPATIENT
Start: 2024-05-22 | End: 2024-05-28 | Stop reason: HOSPADM

## 2024-05-22 RX ORDER — BUTALBITAL, ACETAMINOPHEN AND CAFFEINE 50; 325; 40 MG/1; MG/1; MG/1
1 TABLET ORAL EVERY 4 HOURS PRN
Status: DISCONTINUED | OUTPATIENT
Start: 2024-05-22 | End: 2024-05-28 | Stop reason: HOSPADM

## 2024-05-22 RX ORDER — HYDRALAZINE HYDROCHLORIDE 20 MG/ML
10 INJECTION INTRAMUSCULAR; INTRAVENOUS EVERY 6 HOURS PRN
Status: DISCONTINUED | OUTPATIENT
Start: 2024-05-22 | End: 2024-05-28 | Stop reason: HOSPADM

## 2024-05-22 RX ADMIN — VANCOMYCIN HYDROCHLORIDE 750 MG: 750 INJECTION, SOLUTION INTRAVENOUS at 16:17

## 2024-05-22 RX ADMIN — CEFTRIAXONE SODIUM 2 G: 2 INJECTION, SOLUTION INTRAVENOUS at 17:36

## 2024-05-22 RX ADMIN — METRONIDAZOLE 500 MG: 500 INJECTION, SOLUTION INTRAVENOUS at 14:28

## 2024-05-22 RX ADMIN — HEPARIN SODIUM 5000 UNITS: 5000 INJECTION INTRAVENOUS; SUBCUTANEOUS at 20:44

## 2024-05-22 RX ADMIN — METRONIDAZOLE 500 MG: 500 INJECTION, SOLUTION INTRAVENOUS at 06:17

## 2024-05-22 RX ADMIN — INSULIN LISPRO 2 UNITS: 100 INJECTION, SOLUTION INTRAVENOUS; SUBCUTANEOUS at 16:15

## 2024-05-22 RX ADMIN — ACETAMINOPHEN 650 MG: 325 TABLET ORAL at 14:32

## 2024-05-22 RX ADMIN — PRAVASTATIN SODIUM 40 MG: 40 TABLET ORAL at 20:43

## 2024-05-22 RX ADMIN — PREGABALIN 100 MG: 25 CAPSULE ORAL at 20:43

## 2024-05-22 RX ADMIN — HEPARIN SODIUM 5000 UNITS: 5000 INJECTION INTRAVENOUS; SUBCUTANEOUS at 14:28

## 2024-05-22 RX ADMIN — BUTALBITAL, ACETAMINOPHEN, AND CAFFEINE 1 TABLET: 325; 50; 40 TABLET ORAL at 21:33

## 2024-05-22 RX ADMIN — PANTOPRAZOLE SODIUM 40 MG: 40 INJECTION, POWDER, FOR SOLUTION INTRAVENOUS at 06:17

## 2024-05-22 RX ADMIN — SODIUM CHLORIDE 10 ML: 900 IRRIGANT IRRIGATION at 09:00

## 2024-05-22 RX ADMIN — HEPARIN SODIUM 1600 UNITS: 1000 INJECTION INTRAVENOUS; SUBCUTANEOUS at 12:12

## 2024-05-22 RX ADMIN — SODIUM CHLORIDE 10 ML: 900 IRRIGANT IRRIGATION at 21:00

## 2024-05-22 RX ADMIN — METOPROLOL TARTRATE 25 MG: 25 TABLET, FILM COATED ORAL at 20:43

## 2024-05-22 RX ADMIN — METRONIDAZOLE 500 MG: 500 INJECTION, SOLUTION INTRAVENOUS at 22:23

## 2024-05-22 RX ADMIN — PREGABALIN 100 MG: 25 CAPSULE ORAL at 09:06

## 2024-05-22 RX ADMIN — HEPARIN SODIUM 5000 UNITS: 5000 INJECTION INTRAVENOUS; SUBCUTANEOUS at 06:17

## 2024-05-22 ASSESSMENT — PAIN DESCRIPTION - DESCRIPTORS: DESCRIPTORS: ACHING

## 2024-05-22 ASSESSMENT — COGNITIVE AND FUNCTIONAL STATUS - GENERAL
STANDING UP FROM CHAIR USING ARMS: A LITTLE
DAILY ACTIVITIY SCORE: 20
CLIMB 3 TO 5 STEPS WITH RAILING: A LOT
DRESSING REGULAR UPPER BODY CLOTHING: A LITTLE
TOILETING: A LITTLE
HELP NEEDED FOR BATHING: A LITTLE
MOBILITY SCORE: 19
DRESSING REGULAR LOWER BODY CLOTHING: A LITTLE
MOVING TO AND FROM BED TO CHAIR: A LITTLE
WALKING IN HOSPITAL ROOM: A LITTLE

## 2024-05-22 ASSESSMENT — PAIN - FUNCTIONAL ASSESSMENT
PAIN_FUNCTIONAL_ASSESSMENT: 0-10
PAIN_FUNCTIONAL_ASSESSMENT: 0-10
PAIN_FUNCTIONAL_ASSESSMENT: NO/DENIES PAIN
PAIN_FUNCTIONAL_ASSESSMENT: 0-10
PAIN_FUNCTIONAL_ASSESSMENT: 0-10

## 2024-05-22 ASSESSMENT — PAIN SCALES - GENERAL
PAINLEVEL_OUTOF10: 5 - MODERATE PAIN
PAINLEVEL_OUTOF10: 3
PAINLEVEL_OUTOF10: 0 - NO PAIN
PAINLEVEL_OUTOF10: 7
PAINLEVEL_OUTOF10: 0 - NO PAIN
PAINLEVEL_OUTOF10: 6

## 2024-05-22 ASSESSMENT — PAIN SCALES - PAIN ASSESSMENT IN ADVANCED DEMENTIA (PAINAD): TOTALSCORE: MEDICATION (SEE MAR)

## 2024-05-22 ASSESSMENT — PAIN DESCRIPTION - LOCATION: LOCATION: HEAD

## 2024-05-22 NOTE — PROGRESS NOTES
"      Nephrology Progress Note      Nephrology following for ESRD.   Events over night:   Has not had fevers or chills.      /73 (BP Location: Right arm, Patient Position: Lying)   Pulse 66   Temp 36.6 °C (97.8 °F) (Temporal)   Resp 18   Ht 1.549 m (5' 1\")   Wt 74.1 kg (163 lb 5.8 oz)   SpO2 92%   BMI 30.87 kg/m²     Input / Output:  24 HR:   Intake/Output Summary (Last 24 hours) at 5/22/2024 1121  Last data filed at 5/22/2024 0937  Gross per 24 hour   Intake 1150 ml   Output 400 ml   Net 750 ml       Physical Exam   Alert and oriented x 3 NAD  Neck: no JVD  CV: RRR  Lungs: CTA bilaterally  Abd: soft, NT, ND   Ext: No lower extremity edema    Scheduled medications  amLODIPine, 5 mg, oral, Daily  cefTRIAXone, 2 g, intravenous, q24h  heparin (porcine), 5,000 Units, subcutaneous, q8h JOSE DE JESUS  heparin, 2,000 Units, intra-catheter, After Dialysis  heparin, 2,000 Units, intra-catheter, After Dialysis  insulin lispro, 0-5 Units, subcutaneous, TID  metoprolol tartrate, 25 mg, oral, BID  metroNIDAZOLE, 500 mg, intravenous, q8h  pantoprazole, 40 mg, oral, Daily before breakfast   Or  pantoprazole, 40 mg, intravenous, Daily before breakfast  pravastatin, 40 mg, oral, Nightly  pregabalin, 100 mg, oral, BID  sodium chloride, 10 mL, intra-catheter, BID  vancomycin, 750 mg, intravenous, Every Mon/Wed/Fri      Continuous medications     PRN medications  PRN medications: acetaminophen, bisacodyl, dextrose, dextrose, glucagon, glucagon, guaiFENesin, melatonin, ondansetron **OR** ondansetron, vancomycin   Results from last 7 days   Lab Units 05/22/24  0502   SODIUM mmol/L 137   POTASSIUM mmol/L 3.6   CHLORIDE mmol/L 103   CO2 mmol/L 26   BUN mg/dL 25*   CREATININE mg/dL 2.32*   CALCIUM mg/dL 9.1   PROTEIN TOTAL g/dL 5.6*   BILIRUBIN TOTAL mg/dL 0.7   ALK PHOS U/L 216*   ALT U/L 75*   AST U/L 26   GLUCOSE mg/dL 120*      Results from last 7 days   Lab Units 05/22/24  0502 05/21/24  0939   MAGNESIUM mg/dL 1.72 1.64      Results " from last 7 days   Lab Units 05/22/24  0502 05/21/24  0939 05/20/24  0434   WBC AUTO x10*3/uL 4.4 3.6* 5.7   HEMOGLOBIN g/dL 7.4* 8.1* 6.4*   HEMATOCRIT % 22.1* 23.9* 19.8*   PLATELETS AUTO x10*3/uL 195 189 185        Assessment & Plan:     Patient is 79 y.o. female history of DM 2, ESRD, hypertension who is admitted to hospital for fall. Nephrology consulted in view of ESRD.     ESRD  -Hemodialysis Monday Wednesday Friday at [facility]  -Has been compliant with dialysis     Hypertension  -Controlled    Hypokalemia     Anemia of ESRD and myelodysplastic syndrome  -She is on MARILYNN with dialysis  -Hemoglobin is 6.4  -Requirestransfusions despite MARILYNN  -Hemoglobin has been between 8 and 9 as an outpatient in the month of May     CKD-MBD  -Patient currently not on a phosphorus binder     Gram negative bacilli BSI  Has percutaneous cutaneous cholecystotomy drain  -Surgery following  -ID to see   -Patient given Rocephin and IV Vanco after dialysis  -Patient also on metronidazole    Elevated liver enzymes  HFpEF  -Compensated, does not have high intradialytic weight gains and minimal fluid removal required with dialysis     Recommendations:   -Hemodialysis today, 3K, run even continue hemodialysis   -Continue HD Monday Wednesday Friday  -Needs cholecystectomy soon      Please message me through [employer] chat with any questions or concerns.     Pinky Christie DO  5/22/2024  11:21 AM     [facility]    [address], [address]   [place] [address]  Office: [phone]

## 2024-05-22 NOTE — PROGRESS NOTES
Tana Hargrove is a 79 y.o. female on day 2 of admission presenting with Fall, initial encounter.      Subjective   Tana Hargrove is a 79 y.o. female with PMHx s/f CAD, HTN, COPD, ESRD on HD via permacath, DM2, MDS, gout, neuropathy presenting for a fall. Pt lives at home and admits she rolled out of bed onto the floor this morning. Denies head injury or LOC. She has been admitted 6 times this year already for multiple issues. Most recently last admission she had a cholecystostomy tube placed for chronic cholecystitis. She also had an ex lap to remove adhesions between her liver and anterior abdominal wall that were encasing her gallbladder. She denies any other real symptoms at this time. No worsening abdominal pain, fever, chills, issues with the cholecystostomy tube, nausea, vomiting, chest pain, or other symptoms. She has been compliant with taking her medications and with her hemodialysis schedule.     ED Course (Summary):   Vitals on presentation: 100.4 F, 98 bpm, 20 rr, 145/53 --> 180/71, 93% on RA  Labs: CMP glu 165, Cr 2.38, alk phos 292, , , total bilirubin 4.7, lipase 146, Trop 62 --> 63  CBC WBC 9.6, Hg 7.5, Plt 210  Imaging: CXR - no acute process. Agree with interpretation.  XR pelvis - no acute osseous abnormality. Agree with interpretation.  CT a/p - Percutaneous drainage tube noted within the maria isabel hepatis. This  appears to be related to a decompressed gallbladder versus positioning  status post cholecystectomy with residual fluid within the gallbladder  fossa. Clinical correlation for operative history recommended. Agree with interpretation.  C. US gallbladder - Sludge and stones within the gallbladder with edematous wall thickening. No biliary dilatation is appreciated.   Interventions: Admitted to observation for multiple lab abnormalities, mainly LFTs and bilirubin      05/20: Patient was evaluated this morning, awake and alert, denies having chest pain or shortness of breath, no  nausea or vomiting.  Liver function is improving  05/21: Patient was evaluated this a.m., denies having fever or chills, no nausea vomiting, no chest pain or shortness of breath.  Evaluated by cardiology for elevated troponin and EKG changes-recommended medical management and signed off.  Liver function gradually downtrending.  05/22: Patient denies having fever or chills, no nausea or vomiting.  Blood culture growing GNB in 1 bottle.          Objective     Last Recorded Vitals  /73 (BP Location: Right arm, Patient Position: Lying)   Pulse 66   Temp 36.6 °C (97.8 °F) (Temporal)   Resp 18   Wt 74.1 kg (163 lb 5.8 oz)   SpO2 92%   Intake/Output last 3 Shifts:    Intake/Output Summary (Last 24 hours) at 5/22/2024 1107  Last data filed at 5/22/2024 0937  Gross per 24 hour   Intake 1150 ml   Output 400 ml   Net 750 ml       Admission Weight  Weight: 69.9 kg (154 lb) (05/19/24 1117)    Daily Weight  05/22/24 : 74.1 kg (163 lb 5.8 oz)    Image Results  ECG 12 lead  Sinus rhythm  Minimal ST depression, lateral leads  Prolonged QT interval      Physical Exam  Patient is awake and orient, not in apparent distress  Eyes: PERRLA, no conjunctival congestion  Chest: Bilateral Air entry, no crackles or wheezing  Heart: s1S2 regular, no murmur  Abdomen: Soft, non tender, BS present, cholecystostomy tube in place  Ext:    Relevant Results               Assessment/Plan   This patient currently has cardiac telemetry ordered; if you would like to modify or discontinue the telemetry order, click here to go to the orders activity to modify/discontinue the order.    This patient has a central line   Reason for the central line remaining today? Dialysis/Hemapheresis    Sepsis with organ dysfunction of elevated bilirubin  Blood culture positive for GNB in 1 bottle-follow identification and sensitivity report  Continue on antibiotics  Will repeat blood cultures today  ID consulted for assisting in antibiotics  management      Elevated liver enzymes  US abdomen shows sludge and stones within the gallbladder, no biliary dilatation.  Hepatitis panel: Negative  Liver function gradually downtrending     ESRD on hemodialysis  Renal consulted for HD management     Elevated troponins:  Patient denies having chest pain, EKG shows minimal ST depression in lateral leads  Cardiology consulted-recommended medical management and signed off.       DM2:  SSI     Normocytic anemia:  Status post 1 unit of PRBC transfusion on 05/20  Monitor H&H and transfuse as needed     HTN:  Home amlodipine, Lopressor     HLD:  Home statin     Renal diet  DVT ppx: Heparin subcutaneous  DNR arrest, no intubation     Physical debility/deconditioning\  PT/OT    Hypokalemia  Replace and monitor    Perioperative cardiovascular risk stratification  Patient was evaluated by cardiology and feels that patient has elevated risk for perioperative MACE based on functional capacity.         Kate Edmond MD

## 2024-05-22 NOTE — CONSULTS
Inpatient consult to Infectious Diseases  Consult performed by: Gerri Manning, APRN-CNP  Consult ordered by: Kate Edmond MD  Reason for consult: Gram negative bacteremia        History Of Present Illness  Tana Hargrove is a 79 y.o. female with PMHx of CAD, HTN, COPD, ESRD on HD via permacath, T2DM, MDS, gout, Neuropathy who presented to the ED after suffering a fall. Pt lives at home and admits she rolled out of bed onto the floor 5/19 morning. Denied head injury or LOC. Pt has been admitted 6 times this year so far for multiple issues. Most recently last admission she had a cholecystostomy tube placed for chronic cholecystitis. She also had an ex lap to remove adhesions between her liver and anterior abdominal wall that were encasing her gallbladder. She denies any other real symptoms at this time. No worsening abdominal pain, fever, chills, issues with the cholecystostomy tube, nausea, vomiting, chest pain, or other symptoms. She has been compliant with taking her medications and with her hemodialysis schedule.  ID service consulted as blood cx returned positive for klebsiella pneumoniae/ variicola bacteremia.     Afebrile, WBC 4   CT A/P: Percutaneous drainage tube noted within the maria isabel hepatis. This  appears to be related to a decompressed gallbladder versus positioning  status post cholecystectomy with residual fluid within the gallbladder  fossa. Clinical correlation for operative history recommended.     Past Medical History  She has a past medical history of Abnormal levels of other serum enzymes, Acute kidney failure (CMS-HCC), Anemia, CKD (chronic kidney disease), COPD (chronic obstructive pulmonary disease) (Multi), Coronary artery disease, Disease of blood and blood-forming organs, unspecified, HLD (hyperlipidemia), Hypertension, MDS (myelodysplastic syndrome) (Multi), Personal history of other diseases of the musculoskeletal system and connective tissue, Personal history of other specified  conditions, Personal history of other specified conditions, and Type 2 diabetes mellitus (Multi).    She has no past medical history of Adverse effect of anesthesia, Arthritis, Asthma (HHS-HCC), Awareness under anesthesia, CHF (congestive heart failure) (Multi), Delayed emergence from general anesthesia, Disease of thyroid gland, Hard to intubate, History of transfusion, Malignant hyperthermia, PONV (postoperative nausea and vomiting), Pseudocholinesterase deficiency, Spinal headache, or Stroke (Multi).    Surgical History  She has a past surgical history that includes Other surgical history (12/13/2019); Other surgical history (12/13/2019); Other surgical history (Bilateral, 12/13/2019); Other surgical history (Left, 12/13/2019); Other surgical history (12/13/2019); Other surgical history (12/13/2019); Other surgical history (Right, 12/13/2019); Other surgical history (04/16/2021); MR angio head wo IV contrast (11/20/2020); MR angio neck wo IV contrast (11/20/2020); Breast biopsy (Left); Hysterectomy; Breast lumpectomy; Appendectomy; Colonoscopy; Oophorectomy; and Joint replacement.     Social History     Occupational History    Not on file   Tobacco Use    Smoking status: Never    Smokeless tobacco: Never   Vaping Use    Vaping status: Never Used   Substance and Sexual Activity    Alcohol use: Not Currently    Drug use: Never    Sexual activity: Not Currently     Travel History   Travel since 04/22/24    No documented travel since 04/22/24          Family History  No family history on file.  Allergies  Codeine, Hydrocodone, Hydrocodone-acetaminophen, Meperidine (pf), Tramadol, Piperacillin-tazobactam, Adhesive tape-silicones, and Meperidine     Immunization History   Administered Date(s) Administered    Flu vaccine, quadrivalent, high-dose, preservative free, age 65y+ (FLUZONE) 09/26/2020    Hepatitis B vaccine, adult (HEPLISAV) 01/21/2022    Hepatitis B vaccine, pediatric/adolescent (RECOMBIVAX, ENGERIX)  06/06/2007, 10/30/2009    Influenza, High Dose Seasonal, Preservative Free 10/23/2018, 10/12/2023    Influenza, Unspecified 10/03/2017    Moderna SARS-CoV-2 Vaccination 01/16/2021, 02/17/2021, 05/02/2022    Pneumococcal conjugate vaccine, 13-valent (PREVNAR 13) 09/26/2020    Pneumococcal polysaccharide vaccine, 23-valent, age 2 years and older (PNEUMOVAX 23) 11/09/2000, 07/31/2015, 11/09/2020    RSV, 60 Years And Older (AREXVY) 01/17/2024    Td (adult), unspecified 07/29/1998    Tdap vaccine, age 7 year and older (BOOSTRIX, ADACEL) 11/18/2019, 05/17/2022    Zoster vaccine, recombinant, adult (SHINGRIX) 09/26/2020     Medications  Home medications:  Medications Prior to Admission   Medication Sig Dispense Refill Last Dose    allopurinol (Zyloprim) 100 mg tablet Take 1 tablet (100 mg) by mouth once daily. (Patient not taking: Reported on 5/20/2024) 90 tablet 1 Not Taking    cholecalciferol (Vitamin D-3) 50 MCG (2000 UT) tablet Take 1 tablet (2,000 Units) by mouth once daily.       cyanocobalamin (Vitamin B-12) 1,000 mcg tablet Take 1 tablet (1,000 mcg) by mouth once daily.       metoprolol tartrate (Lopressor) 25 mg tablet Take 1 tablet (25 mg) by mouth 2 times a day. 60 tablet 3     pioglitazone (Actos) 15 mg tablet Take 1 tablet (15 mg) by mouth once daily. 30 tablet 3     pravastatin (Pravachol) 40 mg tablet Take 1 tablet (40 mg) by mouth once daily at bedtime. 90 tablet 1     pregabalin (Lyrica) 100 mg capsule TAKE ONE CAPSULE BY MOUTH TWICE DAILY @9AM & 5PM 60 capsule 0     sevelamer carbonate (Renvela) 800 mg tablet Take 1 tablet (800 mg) by mouth 3 times a day with meals. Swallow tablet whole; do not crush, break, or chew. (Patient not taking: Reported on 5/14/2024) 30 tablet 1     SITagliptin phosphate (Januvia) 25 mg tablet Take 1 tablet (25 mg) by mouth once daily. 30 tablet 3      Current medications:  Scheduled medications  amLODIPine, 5 mg, oral, Daily  cefTRIAXone, 1 g, intravenous, q24h  heparin  (porcine), 5,000 Units, subcutaneous, q8h [facility] J[facility]US  heparin, 2,000 Units, intra-catheter, After Dialysis  heparin, 2,000 Units, intra-catheter, After Dialysis  insulin lispro, 0-5 Units, subcutaneous, TID  metoprolol tartrate, 25 mg, oral, BID  metroNIDAZOLE, 500 mg, intravenous, q8h  pantoprazole, 40 mg, oral, Daily before breakfast   Or  pantoprazole, 40 mg, intravenous, Daily before breakfast  pravastatin, 40 mg, oral, Nightly  pregabalin, 100 mg, oral, BID  sodium chloride, 10 mL, intra-catheter, BID  vancomycin, 750 mg, intravenous, Every Mon/Wed/Fri      Continuous medications     PRN medications  PRN medications: acetaminophen, bisacodyl, dextrose, dextrose, glucagon, glucagon, guaiFENesin, melatonin, ondansetron **OR** ondansetron, vancomycin    Review of Systems     Objective  Range of Vitals (last 24 hours)  Heart Rate:  [64-74]   Temp:  [36.6 °C (97.9 °F)-37.6 °C (99.7 °F)]   Resp:  [18]   BP: ()/(46-76)   Weight:  [74.1 kg (163 lb 5.8 oz)]   SpO2:  [92 %-96 %]   Daily Weight  05/22/24 : 74.1 kg (163 lb 5.8 oz)    Body mass index is 30.87 kg/m².     Physical Exam  Patient is awake and orient, not in apparent distress  Eyes: PERRLA, no conjunctival congestion  Chest: Bilateral Air entry, no crackles or wheezing  Heart: s1S2 regular, no murmur  Abdomen: Soft, non tender, BS present, cholecystostomy tube in place  Ext:    Relevant Results    Labs  Results from last 72 hours   Lab Units 05/22/24  0502 05/21/24  0939 05/20/24  0434 05/19/24  1148   WBC AUTO x10*3/uL 4.4 3.6* 5.7 9.6   HEMOGLOBIN g/dL 7.4* 8.1* 6.4* 7.5*   HEMATOCRIT % 22.1* 23.9* 19.8* 22.9*   PLATELETS AUTO x10*3/uL 195 189 185 210   NEUTROS PCT AUTO %  --  66.3  --   --    LYMPHO PCT MAN %  --   --   --  5.0   LYMPHS PCT AUTO %  --  14.9  --   --    MONO PCT MAN %  --   --   --  4.0   MONOS PCT AUTO %  --  11.3  --   --    EOSINO PCT MAN %  --   --   --  0.0   EOS PCT AUTO %  --  6.1  --   --      Results from last 72 hours   Lab Units  "05/22/24  0502 05/21/24  0939 05/20/24  0434   SODIUM mmol/L 137 134* 137   POTASSIUM mmol/L 3.6 2.9* 3.9   CHLORIDE mmol/L 103 98 103   CO2 mmol/L 26 29 27   BUN mg/dL 25* 15 35*   CREATININE mg/dL 2.32* 2.06* 2.57*   GLUCOSE mg/dL 120* 190* 120*   CALCIUM mg/dL 9.1 9.0 9.3   ANION GAP mmol/L 12 10 11   EGFR mL/min/1.73m*2 21* 24* 18*     Results from last 72 hours   Lab Units 05/22/24  0502 05/21/24  0939 05/20/24  0434   ALK PHOS U/L 216* 255* 270*   BILIRUBIN TOTAL mg/dL 0.7 1.0 2.9*   BILIRUBIN DIRECT mg/dL  --   --  1.6*   PROTEIN TOTAL g/dL 5.6* 6.0* 5.6*   ALT U/L 75* 114* 187*   AST U/L 26 45* 117*   ALBUMIN g/dL 3.1* 3.4 3.2*     Estimated Creatinine Clearance: 17.8 mL/min (A) (by C-G formula based on SCr of 2.32 mg/dL (H)).  C-Reactive Protein   Date Value Ref Range Status   03/05/2024 6.12 (H) <1.00 mg/dL Final   02/14/2024 7.93 (H) <1.00 mg/dL Final     CRP   Date Value Ref Range Status   07/31/2021 1.87 (A) mg/dL Final     Comment:     REF VALUE  < 1.00       Sedimentation Rate   Date Value Ref Range Status   02/14/2024 63 (H) 0 - 30 mm/h Final   07/31/2021 22 0 - 30 mm/h Final     No results found for: \"HIV1X2\", \"HIVCONF\", \"XERVSO7NG\"  Hepatitis C AB   Date Value Ref Range Status   05/20/2024 Nonreactive Nonreactive Final     Comment:     Results from patients taking biotin supplements or receiving high-dose biotin therapy should be interpreted with caution due to possible interference with this test. Providers may contact their local laboratory for further information.     Microbiology  Susceptibility data from last 90 days.  Collected Specimen Info Organism Amoxicillin/Clavulanate Ampicillin Ampicillin/Sulbactam Cefazolin Cefazolin (uncomplicated UTIs only) Cefepime Ceftazidime Ciprofloxacin Gentamicin Levofloxacin Nitrofurantoin   04/27/24 Fluid from Aspirate Klebsiella pneumoniae/variicola S R S S    I S S    04/14/24 Urine from Clean Catch/Voided Escherichia coli S R I S S   S S  S   02/27/24 " "Tissue/Biopsy from Diabetic Foot Ulcer Acinetobacter calcoaceticus-baumannii complex   S   S S S S S      Collected Specimen Info Organism Piperacillin/Tazobactam Tobramycin Trimethoprim/Sulfamethoxazole   04/27/24 Fluid from Aspirate Klebsiella pneumoniae/variicola S  S   04/14/24 Urine from Clean Catch/Voided Escherichia coli S  S   02/27/24 Tissue/Biopsy from Diabetic Foot Ulcer Acinetobacter calcoaceticus-baumannii complex S S S     Imaging  Reviewed         Assessment/Plan   Klebsiella pneumoniae/ variicola bacteremia, source ?urinary vs GI   - CXR, lungs are clear. No acute process   - CT A/P- Percutaneous drainage tube noted within the maria isabel hepatis. This  appears to be related to a decompressed gallbladder versus positioning  status post cholecystectomy with residual fluid within the gallbladder  fossa. Clinical correlation for operative history recommended.  - US RUQ; \"Sludge and stones within the gallbladder with edematous wall  thickening.  No biliary dilatation is appreciated. Increased echogenicity and thinning of the right renal cortex consistent with intrinsic renal disease.  No hydronephrosis\".    Elevated LFT's   - Gradually down trending   - Hepatitis panel negative   - US abdomen shows sludge and stones within the gallbladder, no biliary dilatation     ESRD on Hemodialysis   - Renal following for HD management     T2DM  - SSI per primary     Anemia  - S/P 1 unit PRBC transfusion on 5/20  - Monitoring H&H, transfuse as needed per primary    HTN  HLD      PLAN  C/W Ceftriaxone, increase to 2G in setting of bacteremia   C/w Metronidazole   Stop Vancomycin   Follow repeat blood cx   Check urine cx  Monitor temps and counts       GAB Miller-CNP      I personally saw and evaluated the patient. I reviewed the NP Hx, exam and MDM and I agree with the NP assessment and plan unless otherwise addended above.     Daquan Sandhu MD  [phone]  5/22/2024  11 AM  "

## 2024-05-22 NOTE — PROGRESS NOTES
[facility]    Patient Name: aTna Hargrove  Patient : 1945  Room/Bed:     Date: 24  Time: 8:55 AM    Subjective:  Patient denies any new cardiac problems or complaints overnight.  Telemetry showing sinus rhythm with heart rate in the 80s.  During my exam patient was resting in bed.    Assessment/Plan:   1.  Preoperative cardiovascular examination/chronic cholecystitis  Patient is being evaluated by general surgery for possible cholecystectomy.  Patient does have a history of presumed coronary artery disease, HFpEF, end-stage renal disease, and non-insulin-dependent diabetes mellitus. RCRI of 3 placing the patient at increased risk for perioperative MACE.  Patient has low functional capacity.  Echocardiogram done in 2024 showed normal left ventricular systolic function with an ejection fraction of 60%, LVH, grade 1 diastolic dysfunction, normal right ventricular systolic function, and mild to moderate tricuspid valve regurgitation.  Nuclear stress done 2022 showed a small fixed perfusion defect in the mid anterior/apical wall with associated wall motion abnormality suggestive of prior infarct with a calculated ejection fraction of 50% and hypokinesis of the mid anterior wall.  ECG showing sinus rhythm with heart rate 98 bpm.  Patient denies any new cardiac symptoms.  Overall, patient appears to be at elevated risk for perioperative MACE based on history and low functional capacity.  Patient reported understanding of her risk for perioperative cardiovascular complications.    2.  Coronary artery disease  Patient has a history of presumed coronary artery disease but denies any anginal symptoms.  Patient with minimally abnormal troponins during this admission with peak of 98.  Nuclear stress noted above.  Echocardiogram done in 2024 showing normal LV systolic function.  Continue with medical therapy.    3.  HFpEF  Patient does not appear  significantly volume overloaded on physical exam.  Echocardiogram done in February 2024 showed normal left ventricular systolic function with an ejection fraction of 60%, LVH, grade 1 diastolic dysfunction, normal right ventricular systolic function, and mild to moderate tricuspid valve regurgitation.    4.  Paroxysmal atrial fibrillation  Patient with a history of atrial fibrillation not currently on anticoagulation secondary to chronic anemia and need for blood transfusions.  Telemetry currently showing sinus rhythm.  Patient on beta-blocker for heart rate control.    5.  End-stage renal disease  Patient with known end-stage renal disease followed by nephrology.    6.  Anemia  CBC shows a hemoglobin of 7.4.  Management per hospitalist service.    7.  Hypertension  Continue current medical therapy.    8.  Dyslipidemia  Continue statin therapy    9.  Diabetes mellitus  Management per hospitalist service    Past Medical History:  She has a past medical history of Abnormal levels of other serum enzymes, Acute kidney failure (CMS-HCC), Anemia, CKD (chronic kidney disease), COPD (chronic obstructive pulmonary disease) (Multi), Coronary artery disease, Disease of blood and blood-forming organs, unspecified, HLD (hyperlipidemia), Hypertension, MDS (myelodysplastic syndrome) (Multi), Personal history of other diseases of the musculoskeletal system and connective tissue, Personal history of other specified conditions, Personal history of other specified conditions, and Type 2 diabetes mellitus (Multi).    She has no past medical history of Adverse effect of anesthesia, Arthritis, Asthma ([facility]), Awareness under anesthesia, CHF (congestive heart failure) (Multi), Delayed emergence from general anesthesia, Disease of thyroid gland, Hard to intubate, History of transfusion, Malignant hyperthermia, PONV (postoperative nausea and vomiting), Pseudocholinesterase deficiency, Spinal headache, or Stroke (Multi).    Past Surgical  History:  She has a past surgical history that includes Other surgical history (12/13/2019); Other surgical history (12/13/2019); Other surgical history (Bilateral, 12/13/2019); Other surgical history (Left, 12/13/2019); Other surgical history (12/13/2019); Other surgical history (12/13/2019); Other surgical history (Right, 12/13/2019); Other surgical history (04/16/2021); MR angio head wo IV contrast (11/20/2020); MR angio neck wo IV contrast (11/20/2020); Breast biopsy (Left); Hysterectomy; Breast lumpectomy; Appendectomy; Colonoscopy; Oophorectomy; and Joint replacement.      Social History:  She reports that she has never smoked. She has never used smokeless tobacco. She reports that she does not currently use alcohol. She reports that she does not use drugs.    Family History:  No family history on file.     Allergies:  Codeine, Hydrocodone, Hydrocodone-acetaminophen, Meperidine (pf), Tramadol, Piperacillin-tazobactam, Adhesive tape-silicones, and Meperidine    Outpatient Medications:  Current Outpatient Medications   Medication Instructions    allopurinol (ZYLOPRIM) 100 mg, oral, Daily    cholecalciferol (Vitamin D-3) 50 MCG (2000 UT) tablet 1 tablet, oral, Daily    cyanocobalamin (Vitamin B-12) 1,000 mcg tablet 1 tablet, oral, Daily    metoprolol tartrate (LOPRESSOR) 25 mg, oral, 2 times daily    pioglitazone (ACTOS) 15 mg, oral, Daily    pravastatin (PRAVACHOL) 40 mg, oral, Nightly    pregabalin (Lyrica) 100 mg capsule TAKE ONE CAPSULE BY MOUTH TWICE DAILY @9AM & 5PM    sevelamer carbonate (RENVELA) 800 mg, oral, 3 times daily (morning, midday, late afternoon), Swallow tablet whole; do not crush, break, or chew.    SITagliptin phosphate (JANUVIA) 25 mg, oral, Daily        ROS:  A 14 point review of systems was done and is negative other than as stated in HPI    Vitals:  Vitals:    05/21/24 1949 05/21/24 2309 05/22/24 0315 05/22/24 0735   BP: 160/70 176/70 159/76 178/73   BP Location: Right arm Right arm  Right arm Right arm   Patient Position: Lying Lying Lying Lying   Pulse: 70 67 64 69   Resp: 18 18 18 18   Temp: 37.3 °C (99.1 °F) 37.1 °C (98.8 °F) 37.1 °C (98.8 °F) 36.6 °C (97.9 °F)   TempSrc: Temporal Temporal Temporal Temporal   SpO2: 95% 94% 94% 92%   Weight:   74.1 kg (163 lb 5.8 oz)    Height:           Physical Exam:     Constitutional: Cooperative, in no acute distress, alert, appears stated age.  Skin: Skin color, texture, turgor normal. No rashes or lesions.  Head: Normocephalic. No masses, lesions, tenderness or abnormalities  Eyes: Extraocular movements are grossly intact.  Mouth and throat: Mucous membranes moist  Neck: Neck supple, no carotid bruits, no JVD  Respiratory: Lungs clear to auscultation, no wheezing or rhonchi, no use of accessory muscles  Chest wall: No scars, normal excursion with respiration  Cardiovascular: Regular rhythm without murmur  Gastrointestinal: Abdomen soft, nontender. Bowel sounds normal.  Musculoskeletal: Strength equal in upper extremities  Extremities: Trace pitting edema  Neurologic: Sensation grossly intact, alert and oriented ×3    Intake/Output:   I/O last 2 completed shifts:  In: 950 (12.8 mL/kg) [P.O.:500; IV Piggyback:450]  Out: 400 (5.4 mL/kg) [Urine:400 (0.2 mL/kg/hr)]  Weight: 74.1 kg     Outpatient Medications  No current facility-administered medications on file prior to encounter.     Current Outpatient Medications on File Prior to Encounter   Medication Sig Dispense Refill    allopurinol (Zyloprim) 100 mg tablet Take 1 tablet (100 mg) by mouth once daily. (Patient not taking: Reported on 5/20/2024) 90 tablet 1    cholecalciferol (Vitamin D-3) 50 MCG (2000 UT) tablet Take 1 tablet (2,000 Units) by mouth once daily.      cyanocobalamin (Vitamin B-12) 1,000 mcg tablet Take 1 tablet (1,000 mcg) by mouth once daily.      metoprolol tartrate (Lopressor) 25 mg tablet Take 1 tablet (25 mg) by mouth 2 times a day. 60 tablet 3    pioglitazone (Actos) 15 mg tablet  Take 1 tablet (15 mg) by mouth once daily. 30 tablet 3    pravastatin (Pravachol) 40 mg tablet Take 1 tablet (40 mg) by mouth once daily at bedtime. 90 tablet 1    pregabalin (Lyrica) 100 mg capsule TAKE ONE CAPSULE BY MOUTH TWICE DAILY @9AM & 5PM 60 capsule 0    sevelamer carbonate (Renvela) 800 mg tablet Take 1 tablet (800 mg) by mouth 3 times a day with meals. Swallow tablet whole; do not crush, break, or chew. (Patient not taking: Reported on 5/14/2024) 30 tablet 1    SITagliptin phosphate (Januvia) 25 mg tablet Take 1 tablet (25 mg) by mouth once daily. 30 tablet 3    [DISCONTINUED] amLODIPine (Norvasc) 5 mg tablet Take 1 tablet (5 mg) by mouth once daily.         Scheduled medications  amLODIPine, 5 mg, oral, Daily  cefTRIAXone, 1 g, intravenous, q24h  heparin (porcine), 5,000 Units, subcutaneous, q8h JOSE DE JESUS  heparin, 2,000 Units, intra-catheter, After Dialysis  heparin, 2,000 Units, intra-catheter, After Dialysis  insulin lispro, 0-5 Units, subcutaneous, TID  metoprolol tartrate, 25 mg, oral, BID  metroNIDAZOLE, 500 mg, intravenous, q8h  pantoprazole, 40 mg, oral, Daily before breakfast   Or  pantoprazole, 40 mg, intravenous, Daily before breakfast  pravastatin, 40 mg, oral, Nightly  pregabalin, 100 mg, oral, BID  sodium chloride, 10 mL, intra-catheter, BID  vancomycin, 750 mg, intravenous, Every Mon/Wed/Fri      Continuous medications     PRN medications  PRN medications: acetaminophen, bisacodyl, dextrose, dextrose, glucagon, glucagon, guaiFENesin, melatonin, ondansetron **OR** ondansetron, vancomycin   Medications Prior to Admission   Medication Sig Dispense Refill Last Dose    allopurinol (Zyloprim) 100 mg tablet Take 1 tablet (100 mg) by mouth once daily. (Patient not taking: Reported on 5/20/2024) 90 tablet 1 Not Taking    cholecalciferol (Vitamin D-3) 50 MCG (2000 UT) tablet Take 1 tablet (2,000 Units) by mouth once daily.       cyanocobalamin (Vitamin B-12) 1,000 mcg tablet Take 1 tablet (1,000 mcg) by  mouth once daily.       metoprolol tartrate (Lopressor) 25 mg tablet Take 1 tablet (25 mg) by mouth 2 times a day. 60 tablet 3     pioglitazone (Actos) 15 mg tablet Take 1 tablet (15 mg) by mouth once daily. 30 tablet 3     pravastatin (Pravachol) 40 mg tablet Take 1 tablet (40 mg) by mouth once daily at bedtime. 90 tablet 1     pregabalin (Lyrica) 100 mg capsule TAKE ONE CAPSULE BY MOUTH TWICE DAILY @9AM & 5PM 60 capsule 0     sevelamer carbonate (Renvela) 800 mg tablet Take 1 tablet (800 mg) by mouth 3 times a day with meals. Swallow tablet whole; do not crush, break, or chew. (Patient not taking: Reported on 5/14/2024) 30 tablet 1     SITagliptin phosphate (Januvia) 25 mg tablet Take 1 tablet (25 mg) by mouth once daily. 30 tablet 3        Recent Labs: (past 2 days)  Recent Results (from the past 48 hour(s))   Protime-INR    Collection Time: 05/20/24 10:02 AM   Result Value Ref Range    Protime 17.3 (H) 9.8 - 12.8 seconds    INR 1.5 (H) 0.9 - 1.1   APTT    Collection Time: 05/20/24 10:02 AM   Result Value Ref Range    aPTT 32 27 - 38 seconds   POCT GLUCOSE    Collection Time: 05/20/24 11:16 AM   Result Value Ref Range    POCT Glucose 209 (H) 74 - 99 mg/dL   Hepatitis panel, acute    Collection Time: 05/20/24  2:35 PM   Result Value Ref Range    Hepatitis B Surface AG Nonreactive Nonreactive    Hepatitis A  AB- IgM Nonreactive Nonreactive    Hepatitis B Core AB; IgM Nonreactive Nonreactive    Hepatitis C AB Nonreactive Nonreactive   Blood Culture    Collection Time: 05/20/24  5:31 PM    Specimen: Dialysis; Blood culture   Result Value Ref Range    Blood Culture No growth at 1 day    Blood Culture    Collection Time: 05/20/24  5:40 PM    Specimen: Peripheral Venipuncture; Blood culture   Result Value Ref Range    Blood Culture No growth at 1 day    Blood Culture    Collection Time: 05/20/24  5:50 PM    Specimen: Peripheral Venipuncture; Blood culture   Result Value Ref Range    Blood Culture       Identification  and susceptibility testing to follow    Gram Stain Gram negative bacilli (AA)    POCT GLUCOSE    Collection Time: 05/20/24  6:13 PM   Result Value Ref Range    POCT Glucose 159 (H) 74 - 99 mg/dL   POCT GLUCOSE    Collection Time: 05/21/24  8:58 AM   Result Value Ref Range    POCT Glucose 152 (H) 74 - 99 mg/dL   CBC and Auto Differential    Collection Time: 05/21/24  9:39 AM   Result Value Ref Range    WBC 3.6 (L) 4.4 - 11.3 x10*3/uL    nRBC 0.6 (H) 0.0 - 0.0 /100 WBCs    RBC 2.68 (L) 4.00 - 5.20 x10*6/uL    Hemoglobin 8.1 (L) 12.0 - 16.0 g/dL    Hematocrit 23.9 (L) 36.0 - 46.0 %    MCV 89 80 - 100 fL    MCH 30.2 26.0 - 34.0 pg    MCHC 33.9 32.0 - 36.0 g/dL    RDW 26.7 (H) 11.5 - 14.5 %    Platelets 189 150 - 450 x10*3/uL    Neutrophils % 66.3 40.0 - 80.0 %    Immature Granulocytes %, Automated 0.8 0.0 - 0.9 %    Lymphocytes % 14.9 13.0 - 44.0 %    Monocytes % 11.3 2.0 - 10.0 %    Eosinophils % 6.1 0.0 - 6.0 %    Basophils % 0.6 0.0 - 2.0 %    Neutrophils Absolute 2.41 1.60 - 5.50 x10*3/uL    Immature Granulocytes Absolute, Automated 0.03 0.00 - 0.50 x10*3/uL    Lymphocytes Absolute 0.54 (L) 0.80 - 3.00 x10*3/uL    Monocytes Absolute 0.41 0.05 - 0.80 x10*3/uL    Eosinophils Absolute 0.22 0.00 - 0.40 x10*3/uL    Basophils Absolute 0.02 0.00 - 0.10 x10*3/uL   Magnesium    Collection Time: 05/21/24  9:39 AM   Result Value Ref Range    Magnesium 1.64 1.60 - 2.40 mg/dL   Comprehensive metabolic panel    Collection Time: 05/21/24  9:39 AM   Result Value Ref Range    Glucose 190 (H) 74 - 99 mg/dL    Sodium 134 (L) 136 - 145 mmol/L    Potassium 2.9 (LL) 3.5 - 5.3 mmol/L    Chloride 98 98 - 107 mmol/L    Bicarbonate 29 21 - 32 mmol/L    Anion Gap 10 10 - 20 mmol/L    Urea Nitrogen 15 6 - 23 mg/dL    Creatinine 2.06 (H) 0.50 - 1.05 mg/dL    eGFR 24 (L) >60 mL/min/1.73m*2    Calcium 9.0 8.6 - 10.3 mg/dL    Albumin 3.4 3.4 - 5.0 g/dL    Alkaline Phosphatase 255 (H) 33 - 136 U/L    Total Protein 6.0 (L) 6.4 - 8.2 g/dL    AST 45  (H) 9 - 39 U/L    Bilirubin, Total 1.0 0.0 - 1.2 mg/dL     (H) 7 - 45 U/L   Morphology    Collection Time: 05/21/24  9:39 AM   Result Value Ref Range    RBC Morphology See Below     Polychromasia Mild     Hypochromia Mild     Ovalocytes Few     Basophilic Stippling Present     Giant Platelets Few    POCT GLUCOSE    Collection Time: 05/21/24 12:05 PM   Result Value Ref Range    POCT Glucose 201 (H) 74 - 99 mg/dL   POCT GLUCOSE    Collection Time: 05/21/24  4:13 PM   Result Value Ref Range    POCT Glucose 165 (H) 74 - 99 mg/dL   POCT GLUCOSE    Collection Time: 05/21/24  7:53 PM   Result Value Ref Range    POCT Glucose 210 (H) 74 - 99 mg/dL   Comprehensive Metabolic Panel    Collection Time: 05/22/24  5:02 AM   Result Value Ref Range    Glucose 120 (H) 74 - 99 mg/dL    Sodium 137 136 - 145 mmol/L    Potassium 3.6 3.5 - 5.3 mmol/L    Chloride 103 98 - 107 mmol/L    Bicarbonate 26 21 - 32 mmol/L    Anion Gap 12 10 - 20 mmol/L    Urea Nitrogen 25 (H) 6 - 23 mg/dL    Creatinine 2.32 (H) 0.50 - 1.05 mg/dL    eGFR 21 (L) >60 mL/min/1.73m*2    Calcium 9.1 8.6 - 10.3 mg/dL    Albumin 3.1 (L) 3.4 - 5.0 g/dL    Alkaline Phosphatase 216 (H) 33 - 136 U/L    Total Protein 5.6 (L) 6.4 - 8.2 g/dL    AST 26 9 - 39 U/L    Bilirubin, Total 0.7 0.0 - 1.2 mg/dL    ALT 75 (H) 7 - 45 U/L   CBC    Collection Time: 05/22/24  5:02 AM   Result Value Ref Range    WBC 4.4 4.4 - 11.3 x10*3/uL    nRBC 0.0 0.0 - 0.0 /100 WBCs    RBC 2.52 (L) 4.00 - 5.20 x10*6/uL    Hemoglobin 7.4 (L) 12.0 - 16.0 g/dL    Hematocrit 22.1 (L) 36.0 - 46.0 %    MCV 88 80 - 100 fL    MCH 29.4 26.0 - 34.0 pg    MCHC 33.5 32.0 - 36.0 g/dL    RDW 26.3 (H) 11.5 - 14.5 %    Platelets 195 150 - 450 x10*3/uL   Magnesium    Collection Time: 05/22/24  5:02 AM   Result Value Ref Range    Magnesium 1.72 1.60 - 2.40 mg/dL   POCT GLUCOSE    Collection Time: 05/22/24  7:44 AM   Result Value Ref Range    POCT Glucose 138 (H) 74 - 99 mg/dL       CV Studies:  EKG:  Encounter Date:  05/19/24   ECG 12 lead   Result Value    Ventricular Rate 98    Atrial Rate 96    NM Interval 164    QRS Duration 88    QT Interval 397    QTC Calculation(Bazett) 507    P Axis 48    R Axis 11    T Axis 58    QRS Count 16    Q Onset 249    T Offset 448    QTC Fredericia 467    Narrative    Sinus rhythm  Minimal ST depression, lateral leads  Prolonged QT interval     Echocardiogram:   Echocardiogram     Narrative  [place] [id number]  Phone [phone] Fax [phone]    TRANSTHORACIC ECHOCARDIOGRAM REPORT      Patient Name:     YVONNE NOEL SASHA      Reading Physician:   [id number] Humphrey Callaway DO  Study Date:       12/14/2022          Referring Physician: JASMIN ROACH  MRN/PID:          [id number]            PCP:  Accession/Order#: [id number]           Department Location[facility]  YOB: 1945           Fellow:  Gender:           F                   Nurse:  Admit Date:       12/14/2022          Sonographer:         Urvashi Arora RDCS  Admission Status: Inpatient - Routine Additional Staff:  Height:           152.40 cm           CC Report to:  Weight:           75.30 kg            Study Type:          Echocardiogram  BSA:              1.72 m2  Blood Pressure: 159 /63 mmHg    Diagnosis/ICD: R06.00-Dyspnea, unspecified  Indication:    Dyspnea  Procedure/CPT: Echo Complete w Full [id number]    Patient History:  Pertinent History: Dyspnea.    Study Detail: The following Echo studies were performed: 2D, M-Mode, Doppler and  color flow.      PHYSICIAN INTERPRETATION:  Left Ventricle: Left ventricular systolic function is low normal, with an estimated ejection fraction of 50-55%. There are no regional wall motion abnormalities. The left ventricular cavity size is normal. The left ventricular septal wall thickness is mildly increased. Left ventricular diastolic filling was indeterminate.  Left Atrium: The left atrium is moderate to severely dilated.  Right  Ventricle: The right ventricle is normal in size. There is low normal right ventricular systolic function.  Right Atrium: The right atrium is mildly dilated.  Aortic Valve: The aortic valve is trileaflet. There is no evidence of aortic valve regurgitation.  Mitral Valve: The mitral valve is normal in structure. There is mild mitral valve regurgitation.  Tricuspid Valve: The tricuspid valve is structurally normal. There is mild to moderate tricuspid regurgitation. The Doppler estimated RVSP is moderately elevated at 52.4 mmHg.  Pulmonic Valve: The pulmonic valve is structurally normal. There is trace pulmonic valve regurgitation.  Pericardium: There is no pericardial effusion noted.  Aorta: The aortic root is normal.      CONCLUSIONS:  1. Left ventricular systolic function is low normal with a 50-55% estimated ejection fraction.  2. There is low normal right ventricular systolic function.  3. The left atrium is moderate to severely dilated.  4. Mild to moderate tricuspid regurgitation.  5. Moderately elevated right ventricular systolic pressure.    QUANTITATIVE DATA SUMMARY:  2D MEASUREMENTS:  Normal Ranges:  IVSd:          1.29 cm    (0.6-1.1cm)  LVPWd:         1.04 cm    (0.6-1.1cm)  LVIDd:         5.17 cm    (3.9-5.9cm)  LVIDs:         3.93 cm  LV Mass Index: 136.8 g/m2  LV % FS        24.0 %    LA VOLUME:  Normal Ranges:  LA Vol A4C:        81.6 ml    (22+/-6mL/m2)  LA Vol A2C:        81.6 ml  LA Vol BP:         81.6 ml  LA Vol Index A4C:  47.3ml/m2  LA Vol Index A2C:  47.3 ml/m2  LA Vol Index BP:   47.3 ml/m2  LA Area A4C:       24.0 cm2  LA Area A2C:       24.0 cm2  LA Major Axis A4C: 6.0 cm  LA Major Axis A2C: 6.0 cm  LA Volume Index:   47.0 ml/m2  LA Vol A4C:        80.3 ml  LA Vol A2C:        71.0 ml    RA VOLUME BY A/L METHOD:  Normal Ranges:  RA Area A4C: 15.0 cm2    AORTA MEASUREMENTS:  Normal Ranges:  AoV Silva,s: 2.90 cm (1.4-2.6cm)  Asc Ao, d: 3.30 cm (2.1-3.4cm)    LV SYSTOLIC FUNCTION BY 2D PLANIMETRY  (MOD):  Normal Ranges:  EF-A4C View: 46.2 % (>=55%)  EF-A2C View: 55.6 %  EF-Biplane:  50.6 %    LV DIASTOLIC FUNCTION:  Normal Ranges:  MV Peak E:    1.05 m/s    (0.7-1.2 m/s)  MV Peak A:    0.91 m/s    (0.42-0.7 m/s)  E/A Ratio:    1.14        (1.0-2.2)  MV e'         0.08 m/s    (>8.0)  MV lateral e' 0.10 m/s  MV medial e'  0.07 m/s  MV A Dur:     108.47 msec  E/e' Ratio:   12.32       (<8.0)  LV IVRT:      74 msec     (<100ms)    MITRAL VALVE:  Normal Ranges:  MV DT: 153 msec (150-240msec)    AORTIC VALVE:  Normal Ranges:  LVOT Max Mario:  1.07 m/s (<=1.1m/s)  LVOT VTI:      25.50 cm  LVOT Diameter: 1.73 cm  (1.8-2.4cm)    RIGHT VENTRICLE:  RV 1   2.8 cm  RV 2   2.3 cm  RV 3   6.4 cm  TAPSE: 26.5 mm  RV s'  0.12 m/s    TRICUSPID VALVE/RVSP:  Normal Ranges:  Peak TR Velocity: 3.51 m/s  RV Syst Pressure: 52.4 mmHg (< 30mmHg)  TV E Vmax:        0.42 m/s  (0.3-0.7m/s)  TV A Vmax:        0.56 m/s  IVC Diam:         1.33 cm  TV e'             0.1 m/s    AORTA:  Asc Ao Diam 3.27 cm      [id number] Sherie Cesia DO  Electronically signed on 12/14/2022 at 4:35:23 PM         Final     Stress Testing IMESULT([id number]):   NM CARDIAC STRESS REST (MYOCARDIAL PERFUSION MIBI) 04/08/2022    Narrative  MRN: [id number]  Patient Name: YVONNE BAEZ    STUDY:  CARDIAC STRESS/REST INJECTION; PART 2 STRESS OR REST (NO CHARGE);  CARDIAC STRESS/REST (MYOCARDIAL PERFUSION/MIBI);  4/8/2022 12:00 pm    INDICATION:  CP .    COMPARISON:  None.    ACCESSION NUMBER(S):  [id number]; [id number]; [id number]    ORDERING CLINICIAN:  SHERIE MARINO    TECHNIQUE:  DIVISION OF NUCLEAR MEDICINE  PHARMACOLOGIC STRESS MYOCARDIAL PERFUSION SCAN, ONE DAY PROTOCOL    The patient received an intravenous dose of  11.6 mCi of Tc-99m  tetrofosmin and resting emission tomographic (SPECT) images of the  myocardium were acquired. The patient then received an intravenous  infusion of 0.4mg regadenoson (Lexiscan) followed by an additional  dose of  33.7 mCi of Tc-99m tetrofosmin.  Stress phase SPECT images of  the myocardium were then acquired. These included ECG-gated images to  assess and quantify ventricular function.    FINDINGS:  There is a small fixed perfusion defect in the mid  anterior/anteroseptal wall with associated wall motion abnormality  consistent with prior infarct. No reversible perfusion defects seen.    Calculated ejection fraction 50% mild hypokinesis of the mid anterior  wall.    Impression  1. There is a small fixed perfusion defect in the mid  anterior/anteroseptal wall with associated wall motion abnormality  consistent with prior infarct. There is a low probability of ischemia.    2. Calculated ejection fraction 50% mild hypokinesis of the mid  anterior wall.    Cardiac Catheterization: No results found for this or any previous visit from the past 1825 days.  No results found for this or any previous visit from the past 3650 days.     Cardiac Scoring: No results found for this or any previous visit from the past 1825 days.    AAA : No results found for this or any previous visit from the past 1825 days.    OTHER: No results found for this or any previous visit from the past 1825 days.    LAST IMAGING RESULTS  ECG 12 lead  Sinus rhythm  Minimal ST depression, lateral leads  Prolonged QT interval        Humphrey Callaway DO, RAFAEL, FACOI  5/22/2024  8:55 AM

## 2024-05-22 NOTE — CARE PLAN
The clinical goals for the shift include Pt will be free from falls.  Problem: Pain - Adult  Goal: Verbalizes/displays adequate comfort level or baseline comfort level  Outcome: Progressing     Problem: Safety - Adult  Goal: Free from fall injury  Outcome: Progressing     Problem: Discharge Planning  Goal: Discharge to home or other facility with appropriate resources  Outcome: Progressing     Problem: Chronic Conditions and Co-morbidities  Goal: Patient's chronic conditions and co-morbidity symptoms are monitored and maintained or improved  Outcome: Progressing     Problem: Diabetes  Goal: Achieve decreasing blood glucose levels by end of shift  Outcome: Progressing  Goal: Increase stability of blood glucose readings by end of shift  Outcome: Progressing  Goal: Decrease in ketones present in urine by end of shift  Outcome: Progressing  Goal: Maintain electrolyte levels within acceptable range throughout shift  Outcome: Progressing  Goal: Maintain glucose levels >70mg/dl to <250mg/dl throughout shift  Outcome: Progressing  Goal: No changes in neurological exam by end of shift  Outcome: Progressing  Goal: Learn about and adhere to nutrition recommendations by end of shift  Outcome: Progressing  Goal: Vital signs within normal range for age by end of shift  Outcome: Progressing  Goal: Increase self care and/or family involovement by end of shift  Outcome: Progressing  Goal: Receive DSME education by end of shift  Outcome: Progressing     Problem: Heart Failure  Goal: Improved gas exchange this shift  Outcome: Progressing  Goal: Improved urinary output this shift  Outcome: Progressing  Goal: Reduction in peripheral edema within 24 hours  Outcome: Progressing  Goal: Report improvement of dyspnea/breathlessness this shift  Outcome: Progressing  Goal: Weight from fluid excess reduced over 2-3 days, then stabilize  Outcome: Progressing  Goal: Increase self care and/or family involvement in 24 hours  Outcome:  Progressing     Problem: Skin  Goal: Decreased wound size/increased tissue granulation at next dressing change  Outcome: Progressing  Flowsheets (Taken 5/21/2024 2242)  Decreased wound size/increased tissue granulation at next dressing change: Promote sleep for wound healing  Goal: Participates in plan/prevention/treatment measures  Outcome: Progressing  Goal: Prevent/manage excess moisture  Outcome: Progressing  Goal: Prevent/minimize sheer/friction injuries  Outcome: Progressing  Goal: Promote/optimize nutrition  Outcome: Progressing  Goal: Promote skin healing  Outcome: Progressing     Problem: Fall/Injury  Goal: Not fall by end of shift  Outcome: Progressing  Goal: Be free from injury by end of the shift  Outcome: Progressing  Goal: Verbalize understanding of personal risk factors for fall in the hospital  Outcome: Progressing  Goal: Verbalize understanding of risk factor reduction measures to prevent injury from fall in the home  Outcome: Progressing  Goal: Use assistive devices by end of the shift  Outcome: Progressing  Goal: Pace activities to prevent fatigue by end of the shift  Outcome: Progressing

## 2024-05-22 NOTE — PROCEDURES
Report from Sending RN:    Report From: April Linker RN  Recent Surgery of Procedure:   Yes, Colisesectomy  Baseline Level of Consciousness (LOC): x4  Oxygen Use: No  Type: RA  Diabetic: Yes  Last BP Med Given Day of Dialysis:   See EMAR  Last Pain Med Given: See EMAR  Lab Tests to be Obtained with Dialysis: No  Blood Transfusion to be Given During Dialysis: No  Available IV Access: Yes  Medications to be Administered During Dialysis: No  Continuous IV Infusion Running: No  Restraints on Currently or in the Last 24 Hours: No  Hand-Off Communication: 170s BP, will give BP meds if not down in 1 -1.5hr  Dialysis Catheter Dressing: Will check prior to HD  Last Dressing Change: Will check prior to HD

## 2024-05-23 LAB
ALBUMIN SERPL BCP-MCNC: 3.1 G/DL (ref 3.4–5)
ALP SERPL-CCNC: 197 U/L (ref 33–136)
ALT SERPL W P-5'-P-CCNC: 52 U/L (ref 7–45)
ANION GAP BLDV CALCULATED.4IONS-SCNC: 6 MMOL/L (ref 10–25)
ANION GAP SERPL CALC-SCNC: 10 MMOL/L (ref 10–20)
APPEARANCE UR: CLEAR
AST SERPL W P-5'-P-CCNC: 21 U/L (ref 9–39)
BACTERIA UR CULT: NORMAL
BASE EXCESS BLDV CALC-SCNC: 3.5 MMOL/L (ref -2–3)
BASOPHILS # BLD AUTO: 0.02 X10*3/UL (ref 0–0.1)
BASOPHILS NFR BLD AUTO: 0.5 %
BILIRUB SERPL-MCNC: 0.6 MG/DL (ref 0–1.2)
BILIRUB UR STRIP.AUTO-MCNC: NEGATIVE MG/DL
BODY TEMPERATURE: 37 DEGREES CELSIUS
BUN SERPL-MCNC: 13 MG/DL (ref 6–23)
CA-I BLDV-SCNC: 1.28 MMOL/L (ref 1.1–1.33)
CALCIUM SERPL-MCNC: 8.9 MG/DL (ref 8.6–10.3)
CHLORIDE BLDV-SCNC: 103 MMOL/L (ref 98–107)
CHLORIDE SERPL-SCNC: 103 MMOL/L (ref 98–107)
CO2 SERPL-SCNC: 29 MMOL/L (ref 21–32)
COLOR UR: ABNORMAL
CREAT SERPL-MCNC: 1.77 MG/DL (ref 0.5–1.05)
DACRYOCYTES BLD QL SMEAR: NORMAL
EGFRCR SERPLBLD CKD-EPI 2021: 29 ML/MIN/1.73M*2
EOSINOPHIL # BLD AUTO: 0.32 X10*3/UL (ref 0–0.4)
EOSINOPHIL NFR BLD AUTO: 8.3 %
ERYTHROCYTE [DISTWIDTH] IN BLOOD BY AUTOMATED COUNT: 25.7 % (ref 11.5–14.5)
GLUCOSE BLD MANUAL STRIP-MCNC: 133 MG/DL (ref 74–99)
GLUCOSE BLD MANUAL STRIP-MCNC: 161 MG/DL (ref 74–99)
GLUCOSE BLD MANUAL STRIP-MCNC: 162 MG/DL (ref 74–99)
GLUCOSE BLD MANUAL STRIP-MCNC: 191 MG/DL (ref 74–99)
GLUCOSE BLD MANUAL STRIP-MCNC: 210 MG/DL (ref 74–99)
GLUCOSE BLDV-MCNC: 121 MG/DL (ref 74–99)
GLUCOSE SERPL-MCNC: 109 MG/DL (ref 74–99)
GLUCOSE UR STRIP.AUTO-MCNC: NORMAL MG/DL
HCO3 BLDV-SCNC: 27.8 MMOL/L (ref 22–26)
HCT VFR BLD AUTO: 24.2 % (ref 36–46)
HCT VFR BLD EST: 24 % (ref 36–46)
HGB BLD-MCNC: 7.7 G/DL (ref 12–16)
HGB BLDV-MCNC: 7.9 G/DL (ref 12–16)
HYPOCHROMIA BLD QL SMEAR: NORMAL
IMM GRANULOCYTES # BLD AUTO: 0.03 X10*3/UL (ref 0–0.5)
IMM GRANULOCYTES NFR BLD AUTO: 0.8 % (ref 0–0.9)
INHALED O2 CONCENTRATION: 0 %
KETONES UR STRIP.AUTO-MCNC: NEGATIVE MG/DL
LACTATE BLDV-SCNC: 1.3 MMOL/L (ref 0.4–2)
LEUKOCYTE ESTERASE UR QL STRIP.AUTO: NEGATIVE
LYMPHOCYTES # BLD AUTO: 1.32 X10*3/UL (ref 0.8–3)
LYMPHOCYTES NFR BLD AUTO: 34.2 %
MAGNESIUM SERPL-MCNC: 1.66 MG/DL (ref 1.6–2.4)
MCH RBC QN AUTO: 28.7 PG (ref 26–34)
MCHC RBC AUTO-ENTMCNC: 31.8 G/DL (ref 32–36)
MCV RBC AUTO: 90 FL (ref 80–100)
MONOCYTES # BLD AUTO: 0.37 X10*3/UL (ref 0.05–0.8)
MONOCYTES NFR BLD AUTO: 9.6 %
NEUTROPHILS # BLD AUTO: 1.8 X10*3/UL (ref 1.6–5.5)
NEUTROPHILS NFR BLD AUTO: 46.6 %
NITRITE UR QL STRIP.AUTO: NEGATIVE
NRBC BLD-RTO: 0.5 /100 WBCS (ref 0–0)
OVALOCYTES BLD QL SMEAR: NORMAL
OXYHGB MFR BLDV: 84.8 % (ref 45–75)
PCO2 BLDV: 40 MM HG (ref 41–51)
PH BLDV: 7.45 PH (ref 7.33–7.43)
PH UR STRIP.AUTO: 7 [PH]
PLATELET # BLD AUTO: 196 X10*3/UL (ref 150–450)
PO2 BLDV: 51 MM HG (ref 35–45)
POTASSIUM BLDV-SCNC: 4 MMOL/L (ref 3.5–5.3)
POTASSIUM SERPL-SCNC: 3.6 MMOL/L (ref 3.5–5.3)
PROT SERPL-MCNC: 5.5 G/DL (ref 6.4–8.2)
PROT UR STRIP.AUTO-MCNC: ABNORMAL MG/DL
RBC # BLD AUTO: 2.68 X10*6/UL (ref 4–5.2)
RBC # UR STRIP.AUTO: NEGATIVE /UL
RBC #/AREA URNS AUTO: NORMAL /HPF
RBC MORPH BLD: NORMAL
SAO2 % BLDV: 87 % (ref 45–75)
SODIUM BLDV-SCNC: 133 MMOL/L (ref 136–145)
SODIUM SERPL-SCNC: 138 MMOL/L (ref 136–145)
SP GR UR STRIP.AUTO: 1.01
SQUAMOUS #/AREA URNS AUTO: NORMAL /HPF
UROBILINOGEN UR STRIP.AUTO-MCNC: NORMAL MG/DL
WBC # BLD AUTO: 3.9 X10*3/UL (ref 4.4–11.3)
WBC #/AREA URNS AUTO: NORMAL /HPF

## 2024-05-23 PROCEDURE — 99233 SBSQ HOSP IP/OBS HIGH 50: CPT | Performed by: INTERNAL MEDICINE

## 2024-05-23 PROCEDURE — 84132 ASSAY OF SERUM POTASSIUM: CPT | Mod: 91 | Performed by: INTERNAL MEDICINE

## 2024-05-23 PROCEDURE — 2500000001 HC RX 250 WO HCPCS SELF ADMINISTERED DRUGS (ALT 637 FOR MEDICARE OP): Performed by: INTERNAL MEDICINE

## 2024-05-23 PROCEDURE — A4217 STERILE WATER/SALINE, 500 ML: HCPCS | Performed by: SURGERY

## 2024-05-23 PROCEDURE — 2500000004 HC RX 250 GENERAL PHARMACY W/ HCPCS (ALT 636 FOR OP/ED): Performed by: INTERNAL MEDICINE

## 2024-05-23 PROCEDURE — 2500000004 HC RX 250 GENERAL PHARMACY W/ HCPCS (ALT 636 FOR OP/ED): Performed by: SURGERY

## 2024-05-23 PROCEDURE — 85025 COMPLETE CBC W/AUTO DIFF WBC: CPT | Performed by: INTERNAL MEDICINE

## 2024-05-23 PROCEDURE — 36415 COLL VENOUS BLD VENIPUNCTURE: CPT | Performed by: INTERNAL MEDICINE

## 2024-05-23 PROCEDURE — 2500000004 HC RX 250 GENERAL PHARMACY W/ HCPCS (ALT 636 FOR OP/ED): Performed by: STUDENT IN AN ORGANIZED HEALTH CARE EDUCATION/TRAINING PROGRAM

## 2024-05-23 PROCEDURE — 83735 ASSAY OF MAGNESIUM: CPT | Performed by: INTERNAL MEDICINE

## 2024-05-23 PROCEDURE — 81001 URINALYSIS AUTO W/SCOPE: CPT | Performed by: INTERNAL MEDICINE

## 2024-05-23 PROCEDURE — 2500000004 HC RX 250 GENERAL PHARMACY W/ HCPCS (ALT 636 FOR OP/ED): Mod: JZ | Performed by: INTERNAL MEDICINE

## 2024-05-23 PROCEDURE — 2500000001 HC RX 250 WO HCPCS SELF ADMINISTERED DRUGS (ALT 637 FOR MEDICARE OP): Performed by: STUDENT IN AN ORGANIZED HEALTH CARE EDUCATION/TRAINING PROGRAM

## 2024-05-23 PROCEDURE — 82947 ASSAY GLUCOSE BLOOD QUANT: CPT

## 2024-05-23 PROCEDURE — 2500000004 HC RX 250 GENERAL PHARMACY W/ HCPCS (ALT 636 FOR OP/ED): Performed by: NURSE PRACTITIONER

## 2024-05-23 PROCEDURE — 80053 COMPREHEN METABOLIC PANEL: CPT | Performed by: INTERNAL MEDICINE

## 2024-05-23 PROCEDURE — 2060000001 HC INTERMEDIATE ICU ROOM DAILY

## 2024-05-23 PROCEDURE — 2500000001 HC RX 250 WO HCPCS SELF ADMINISTERED DRUGS (ALT 637 FOR MEDICARE OP): Performed by: SURGERY

## 2024-05-23 RX ORDER — HYDROXYZINE HYDROCHLORIDE 25 MG/1
25 TABLET, FILM COATED ORAL EVERY 6 HOURS PRN
Status: DISCONTINUED | OUTPATIENT
Start: 2024-05-23 | End: 2024-05-24

## 2024-05-23 RX ORDER — HYDROXYZINE PAMOATE 25 MG/1
25 CAPSULE ORAL EVERY 6 HOURS PRN
Status: DISCONTINUED | OUTPATIENT
Start: 2024-05-23 | End: 2024-05-23

## 2024-05-23 RX ORDER — ASCORBIC ACID 500 MG
250 TABLET ORAL DAILY
Status: DISCONTINUED | OUTPATIENT
Start: 2024-05-23 | End: 2024-05-28 | Stop reason: HOSPADM

## 2024-05-23 RX ORDER — METRONIDAZOLE 500 MG/100ML
500 INJECTION, SOLUTION INTRAVENOUS EVERY 8 HOURS
Status: DISCONTINUED | OUTPATIENT
Start: 2024-05-23 | End: 2024-05-28 | Stop reason: HOSPADM

## 2024-05-23 RX ORDER — LABETALOL HYDROCHLORIDE 5 MG/ML
20 INJECTION, SOLUTION INTRAVENOUS ONCE
Status: COMPLETED | OUTPATIENT
Start: 2024-05-23 | End: 2024-05-23

## 2024-05-23 RX ORDER — ZINC SULFATE 50(220)MG
50 CAPSULE ORAL DAILY
Status: DISCONTINUED | OUTPATIENT
Start: 2024-05-23 | End: 2024-05-28 | Stop reason: HOSPADM

## 2024-05-23 RX ORDER — BISMUTH SUBSALICYLATE 262 MG
1 TABLET,CHEWABLE ORAL DAILY
Status: DISCONTINUED | OUTPATIENT
Start: 2024-05-23 | End: 2024-05-28 | Stop reason: HOSPADM

## 2024-05-23 RX ADMIN — METRONIDAZOLE 500 MG: 500 INJECTION, SOLUTION INTRAVENOUS at 21:17

## 2024-05-23 RX ADMIN — METOPROLOL TARTRATE 25 MG: 25 TABLET, FILM COATED ORAL at 09:16

## 2024-05-23 RX ADMIN — METOPROLOL TARTRATE 25 MG: 25 TABLET, FILM COATED ORAL at 21:17

## 2024-05-23 RX ADMIN — Medication 50 MG OF ELEMENTAL ZINC: at 14:40

## 2024-05-23 RX ADMIN — BUTALBITAL, ACETAMINOPHEN, AND CAFFEINE 1 TABLET: 325; 50; 40 TABLET ORAL at 21:29

## 2024-05-23 RX ADMIN — OXYCODONE HYDROCHLORIDE AND ACETAMINOPHEN 250 MG: 500 TABLET ORAL at 11:33

## 2024-05-23 RX ADMIN — METRONIDAZOLE 500 MG: 500 INJECTION, SOLUTION INTRAVENOUS at 14:05

## 2024-05-23 RX ADMIN — PRAVASTATIN SODIUM 40 MG: 40 TABLET ORAL at 21:17

## 2024-05-23 RX ADMIN — HEPARIN SODIUM 5000 UNITS: 5000 INJECTION INTRAVENOUS; SUBCUTANEOUS at 04:30

## 2024-05-23 RX ADMIN — INSULIN LISPRO 2 UNITS: 100 INJECTION, SOLUTION INTRAVENOUS; SUBCUTANEOUS at 11:33

## 2024-05-23 RX ADMIN — HEPARIN SODIUM 5000 UNITS: 5000 INJECTION INTRAVENOUS; SUBCUTANEOUS at 14:05

## 2024-05-23 RX ADMIN — HEPARIN SODIUM 5000 UNITS: 5000 INJECTION INTRAVENOUS; SUBCUTANEOUS at 21:17

## 2024-05-23 RX ADMIN — PREGABALIN 100 MG: 25 CAPSULE ORAL at 09:16

## 2024-05-23 RX ADMIN — METRONIDAZOLE 500 MG: 500 INJECTION, SOLUTION INTRAVENOUS at 06:05

## 2024-05-23 RX ADMIN — CEFTRIAXONE SODIUM 2 G: 2 INJECTION, SOLUTION INTRAVENOUS at 17:49

## 2024-05-23 RX ADMIN — HYDRALAZINE HYDROCHLORIDE 10 MG: 20 INJECTION INTRAMUSCULAR; INTRAVENOUS at 04:30

## 2024-05-23 RX ADMIN — PREGABALIN 100 MG: 25 CAPSULE ORAL at 21:17

## 2024-05-23 RX ADMIN — SODIUM CHLORIDE 10 ML: 900 IRRIGANT IRRIGATION at 21:00

## 2024-05-23 RX ADMIN — PANTOPRAZOLE SODIUM 40 MG: 40 TABLET, DELAYED RELEASE ORAL at 06:05

## 2024-05-23 RX ADMIN — INSULIN LISPRO 1 UNITS: 100 INJECTION, SOLUTION INTRAVENOUS; SUBCUTANEOUS at 09:17

## 2024-05-23 RX ADMIN — AMLODIPINE BESYLATE 5 MG: 5 TABLET ORAL at 09:16

## 2024-05-23 RX ADMIN — MULTIVITAMIN TABLET 1 TABLET: TABLET at 11:33

## 2024-05-23 RX ADMIN — SODIUM CHLORIDE 10 ML: 900 IRRIGANT IRRIGATION at 09:00

## 2024-05-23 RX ADMIN — HYDROXYZINE HYDROCHLORIDE 25 MG: 25 TABLET ORAL at 11:33

## 2024-05-23 RX ADMIN — LABETALOL HYDROCHLORIDE 20 MG: 5 INJECTION, SOLUTION INTRAVENOUS at 06:27

## 2024-05-23 ASSESSMENT — PAIN - FUNCTIONAL ASSESSMENT
PAIN_FUNCTIONAL_ASSESSMENT: 0-10

## 2024-05-23 ASSESSMENT — COGNITIVE AND FUNCTIONAL STATUS - GENERAL
STANDING UP FROM CHAIR USING ARMS: A LITTLE
MOVING TO AND FROM BED TO CHAIR: A LITTLE
DAILY ACTIVITIY SCORE: 24
WALKING IN HOSPITAL ROOM: A LITTLE
MOBILITY SCORE: 20
CLIMB 3 TO 5 STEPS WITH RAILING: A LITTLE

## 2024-05-23 ASSESSMENT — PAIN SCALES - GENERAL
PAINLEVEL_OUTOF10: 6
PAINLEVEL_OUTOF10: 0 - NO PAIN

## 2024-05-23 NOTE — PROGRESS NOTES
Patient with positive blood cultures, redrawn on 5/22. Awaiting new results. Plan remains home with [facility] when medically ready.

## 2024-05-23 NOTE — NURSING NOTE
Pt now A&O x4 seems to be able to stay awake while speaking.  Dinner tray just delivered to room. SUZETTE ALVA RN

## 2024-05-23 NOTE — CARE PLAN
The patient's goals for the shift include      The clinical goals for the shift include Pt will maintain a Hemoglobin greater than 7.0.    Over the shift, the patient did not make progress toward the following goals. Barriers to progression include history of a low H/H. Recommendations to address these barriers include frequent labwork.

## 2024-05-23 NOTE — PROGRESS NOTES
Vancomycin Dosing by Pharmacy- Cessation of Therapy    Consult to pharmacy for vancomycin dosing has been discontinued by the prescriber, pharmacy will sign off at this time.    Please call pharmacy if there are further questions or re-enter a consult if vancomycin is resumed.     Mike Austin, PharmD

## 2024-05-23 NOTE — PROGRESS NOTES
"      Nephrology Progress Note      Nephrology following for ESRD.   Events over night:   Has not had fevers or chills.      /69 (BP Location: Right arm, Patient Position: Lying)   Pulse 76   Temp 36.9 °C (98.5 °F) (Temporal)   Resp 18   Ht 1.549 m (5' 1\")   Wt 74.2 kg (163 lb 9.3 oz)   SpO2 96%   BMI 30.91 kg/m²     Input / Output:  24 HR:   Intake/Output Summary (Last 24 hours) at 5/23/2024 1147  Last data filed at 5/23/2024 1026  Gross per 24 hour   Intake 1580 ml   Output 2062 ml   Net -482 ml       Physical Exam   Alert and oriented x 3 NAD  Neck: no JVD  CV: RRR  Lungs: CTA bilaterally  Abd: soft, NT, ND   Ext: No lower extremity edema    Scheduled medications  amLODIPine, 5 mg, oral, Daily  ascorbic acid, 250 mg, oral, Daily  cefTRIAXone, 2 g, intravenous, q24h  heparin (porcine), 5,000 Units, subcutaneous, q8h JOSE DE JESUS  heparin, 2,000 Units, intra-catheter, After Dialysis  heparin, 2,000 Units, intra-catheter, After Dialysis  insulin lispro, 0-5 Units, subcutaneous, TID  metoprolol tartrate, 25 mg, oral, BID  multivitamin, 1 tablet, oral, Daily  pantoprazole, 40 mg, oral, Daily before breakfast   Or  pantoprazole, 40 mg, intravenous, Daily before breakfast  pravastatin, 40 mg, oral, Nightly  pregabalin, 100 mg, oral, BID  sodium chloride, 10 mL, intra-catheter, BID  zinc sulfate, 50 mg of elemental zinc, oral, Daily      Continuous medications     PRN medications  PRN medications: acetaminophen, bisacodyl, butalbital-acetaminophen-caff, dextrose, dextrose, glucagon, glucagon, guaiFENesin, hydrALAZINE, hydrOXYzine HCL, melatonin, ondansetron **OR** ondansetron   Results from last 7 days   Lab Units 05/23/24  0430   SODIUM mmol/L 138   POTASSIUM mmol/L 3.6   CHLORIDE mmol/L 103   CO2 mmol/L 29   BUN mg/dL 13   CREATININE mg/dL 1.77*   CALCIUM mg/dL 8.9   PROTEIN TOTAL g/dL 5.5*   BILIRUBIN TOTAL mg/dL 0.6   ALK PHOS U/L 197*   ALT U/L 52*   AST U/L 21   GLUCOSE mg/dL 109*      Results from last 7 days "   Lab Units 05/23/24  0430 05/22/24  0502 05/21/24  0939   MAGNESIUM mg/dL 1.66 1.72 1.64      Results from last 7 days   Lab Units 05/23/24  0430 05/22/24  0502 05/21/24  0939   WBC AUTO x10*3/uL 3.9* 4.4 3.6*   HEMOGLOBIN g/dL 7.7* 7.4* 8.1*   HEMATOCRIT % 24.2* 22.1* 23.9*   PLATELETS AUTO x10*3/uL 196 195 189        Assessment & Plan:     Patient is 79 y.o. female history of DM 2, ESRD, hypertension who is admitted to hospital for fall. Nephrology consulted in view of ESRD.     ESRD  -Hemodialysis Monday Wednesday Friday at [facility]  -Has been compliant with dialysis     Hypertension  -Controlled    Hypokalemia     Anemia of ESRD and myelodysplastic syndrome  -She is on MARILYNN with dialysis  -Hemoglobin is 6.4  -Requirestransfusions despite MARILYNN  -Hemoglobin has been between 8 and 9 as an outpatient in the month of May     CKD-MBD  -Patient currently not on a phosphorus binder     Gram negative bacilli BSI  Has percutaneous cutaneous cholecystotomy drain  -Surgery following  -ID to see   -  after dialysis  -Patient also on metronidazole    Elevated liver enzymes    HFpEF  -Compensated, does not have high intradialytic weight gains and minimal fluid removal required with dialysis     Recommendations:   -Hemodialysis tomorrow , 3K, run even continue hemodialysis   -Continue HD Monday Wednesday Friday  -Needs cholecystectomy soon      Please message me through EPIC chat with any questions or concerns.     Pinky Christie DO  5/23/2024  11:47 AM     [facility]    [address], [address]   [place] [address]  Office: [phone]

## 2024-05-23 NOTE — NURSING NOTE
Pt change in mental stauts with decreased LOC.  Pt arousable to verbal stimuli and oriented x4, but falling do asleep as she speaks.  MISSY assessed and WNL.  VSS and BG WNL.  Dr Herbert called to BS to assess, orders obtained.

## 2024-05-23 NOTE — SIGNIFICANT EVENT
Patient's nurse noted a changed in her mental status and asked me to come evaluate. This AM when I evaluated patient she was awake and alert and cogent. On my arrival she was tired appearing. She was able to stay awake and was engaging in cogent conversation but appeared to be ready to fall asleep at any time. She was hypertensive but has been so throughout the day. BG was normal. She stated she did not want to go for a CTH. We will check a VBG to assess for hypercarbia and a UA to assess for UTI.

## 2024-05-23 NOTE — PROGRESS NOTES
Tana Hargrove is a 79 y.o. female on day 3 of admission presenting with Fall, initial encounter.      Subjective   Tana Hargrove is a 79 y.o. female with PMHx s/f CAD, HTN, COPD, ESRD on HD via permacath, DM2, MDS, gout, neuropathy presenting for a fall. Pt lives at home and admits she rolled out of bed onto the floor this morning. Denies head injury or LOC. She has been admitted 6 times this year already for multiple issues. Most recently last admission she had a cholecystostomy tube placed for chronic cholecystitis. She also had an ex lap to remove adhesions between her liver and anterior abdominal wall that were encasing her gallbladder. She denies any other real symptoms at this time. No worsening abdominal pain, fever, chills, issues with the cholecystostomy tube, nausea, vomiting, chest pain, or other symptoms. She has been compliant with taking her medications and with her hemodialysis schedule.     ED Course (Summary):   Vitals on presentation: 100.4 F, 98 bpm, 20 rr, 145/53 --> 180/71, 93% on RA  Labs: CMP glu 165, Cr 2.38, alk phos 292, , , total bilirubin 4.7, lipase 146, Trop 62 --> 63  CBC WBC 9.6, Hg 7.5, Plt 210  Imaging: CXR - no acute process. Agree with interpretation.  XR pelvis - no acute osseous abnormality. Agree with interpretation.  CT a/p - Percutaneous drainage tube noted within the maria isabel hepatis. This  appears to be related to a decompressed gallbladder versus positioning  status post cholecystectomy with residual fluid within the gallbladder  fossa. Clinical correlation for operative history recommended. Agree with interpretation.  C. US gallbladder - Sludge and stones within the gallbladder with edematous wall thickening. No biliary dilatation is appreciated.   Interventions: Admitted to observation for multiple lab abnormalities, mainly LFTs and bilirubin      05/20: Patient was evaluated this morning, awake and alert, denies having chest pain or shortness of breath, no  nausea or vomiting.  Liver function is improving  05/21: Patient was evaluated this a.m., denies having fever or chills, no nausea vomiting, no chest pain or shortness of breath.  Evaluated by cardiology for elevated troponin and EKG changes-recommended medical management and signed off.  Liver function gradually downtrending.  05/22: Patient denies having fever or chills, no nausea or vomiting.  Blood culture growing GNB in 1 bottle.     5/23: She's complaining of mild diffuse pruritus.          Objective     Last Recorded Vitals  /69 (BP Location: Right arm, Patient Position: Lying)   Pulse 76   Temp 36.9 °C (98.5 °F) (Temporal)   Resp 18   Wt 74.2 kg (163 lb 9.3 oz)   SpO2 96%   Intake/Output last 3 Shifts:    Intake/Output Summary (Last 24 hours) at 5/23/2024 1234  Last data filed at 5/23/2024 1026  Gross per 24 hour   Intake 980 ml   Output 1750 ml   Net -770 ml       Admission Weight  Weight: 69.9 kg (154 lb) (05/19/24 1117)    Daily Weight  05/23/24 : 74.2 kg (163 lb 9.3 oz)    Image Results  ECG 12 lead  Sinus rhythm  Minimal ST depression, lateral leads  Prolonged QT interval      Physical Exam  Patient is awake and orient, not in apparent distress  Eyes: PERRLA, no conjunctival congestion  Chest: Bilateral Air entry, no crackles or wheezing  Heart: s1S2 regular, no murmur  Abdomen: Soft, non tender, BS present, cholecystostomy tube in place  Ext:    Relevant Results               Assessment/Plan   This patient currently has cardiac telemetry ordered; if you would like to modify or discontinue the telemetry order, click here to go to the orders activity to modify/discontinue the order.    This patient has a central line   Reason for the central line remaining today? Dialysis/Hemapheresis    Sepsis with organ dysfunction of elevated bilirubin  -5/20 Bcx with Klebsiella pneumoniae  -5/222 Bcx pending   -Continue ceftriaxone 2g every day   -ID following    Elevated liver enzymes  US abdomen shows  sludge and stones within the gallbladder, no biliary dilatation.  Hepatitis panel: Negative  Liver function gradually downtrending     ESRD on hemodialysis  Renal consulted for HD management     Elevated troponins:  Patient denies having chest pain, EKG shows minimal ST depression in lateral leads  Cardiology consulted-recommended medical management and signed off.     DM2:  SSI     Normocytic anemia:  Status post 1 unit of PRBC transfusion on 05/20  Monitor H&H and transfuse as needed     HTN:  Home amlodipine, Lopressor     HLD:  Home statin     Renal diet  DVT ppx: Heparin subcutaneous  DNR arrest, no intubation     Physical debility/deconditioning\  PT/OT    Hypokalemia  Replace and monitor    Perioperative cardiovascular risk stratification  Patient was evaluated by cardiology and feels that patient has elevated risk for perioperative MACE based on functional capacity.         Pollo Herbert MD PhD

## 2024-05-23 NOTE — CONSULTS
Nutrition Initial Assessment:   Nutrition Assessment    Reason for Assessment: Provider consult order (Nutrition optimization prior to upcoming surgery)    Medical history per chart:   CAD, HTN, COPD, ESRD on HD via permacath, DM2, MDS, gout, neuropathy, chronic anemia, myelodysplastic syndrome,  chronic cholecystitis with recent chidi tube placement, s/p recent ex lap for removal of adhesions between her liver and anterior abdominal wall     HPI:  Patient presents from home s/p fall    5/23:  Patient awake, alert at time of visit.  Poor appetite reported d/t diet restrictions, difficult finding desired foods on menu.  Plan for upcoming surgery for cholecystectomy, goal to optimize nutrition.  Patient reports typically consuming Ensure supplements, difficulty consuming multiple, agreeable to receive 2 oral supplements during admission.  Reviewed available options, patient willing to trial Ensure max protein and Ensure clear liquid once daily each.        Nutrition History:  Food and Nutrient History: Regular diet.  Ensure supplements, 1 daily.       Current Diet: Adult diet 2-3 grams sodium  Supplement(s): No  Average meal Intake during admission:  limited meal data available to assess, 2 meals noted (50%, 100%).           Nutrition Related Findings:   Oral Symptoms: none Teeth: Dentures upper   GI symptoms: anorexia.   BM:    Wound Type:  right/left foot wounds   (nursing/wound notes provide further details)  Edema: No  Food allergies: NKFA. is allergic to codeine, hydrocodone, hydrocodone-acetaminophen, meperidine (pf), tramadol, piperacillin-tazobactam, adhesive tape-silicones, and meperidine.  Meds/Labs reviewed.  amLODIPine, 5 mg, oral, Daily  ascorbic acid, 250 mg, oral, Daily  cefTRIAXone, 2 g, intravenous, q24h  heparin (porcine), 5,000 Units, subcutaneous, q8h JOSE DE JESUS  heparin, 2,000 Units, intra-catheter, After Dialysis  heparin, 2,000 Units, intra-catheter, After Dialysis  insulin lispro, 0-5 Units,  "subcutaneous, TID  metoprolol tartrate, 25 mg, oral, BID  metroNIDAZOLE, 500 mg, intravenous, q8h  multivitamin, 1 tablet, oral, Daily  pantoprazole, 40 mg, oral, Daily before breakfast   Or  pantoprazole, 40 mg, intravenous, Daily before breakfast  pravastatin, 40 mg, oral, Nightly  pregabalin, 100 mg, oral, BID  sodium chloride, 10 mL, intra-catheter, BID  zinc sulfate, 50 mg of elemental zinc, oral, Daily             Nutrition Significant Labs:    Results from last 7 days   Lab Units 05/23/24  0430 05/22/24  0502 05/21/24  0939   GLUCOSE mg/dL 109* 120* 190*   SODIUM mmol/L 138 137 134*   POTASSIUM mmol/L 3.6 3.6 2.9*   CHLORIDE mmol/L 103 103 98   CO2 mmol/L 29 26 29   BUN mg/dL 13 25* 15   CREATININE mg/dL 1.77* 2.32* 2.06*   EGFR mL/min/1.73m*2 29* 21* 24*   CALCIUM mg/dL 8.9 9.1 9.0   MAGNESIUM mg/dL 1.66 1.72 1.64     Lab Results   Component Value Date    HGBA1C 6.3 (H) 03/14/2024    HGBA1C 6.6 (H) 02/26/2024     Results from last 7 days   Lab Units 05/23/24  1111 05/23/24  0717 05/22/24  1936 05/22/24  1505 05/22/24  0744 05/21/24  1953 05/21/24  1613 05/21/24  1205   POCT GLUCOSE mg/dL 210* 162* 160* 230* 138* 210* 165* 201*       Anthropometrics:  Height: 154.9 cm (5' 1\")   Weight: 74.2 kg (163 lb 9.3 oz)   BMI (Calculated): 30.92  IBW/kg (Dietitian Calculated): 47.7 kg            Weight History:   Wt Readings from Last 10 Encounters:   05/23/24 74.2 kg (163 lb 9.3 oz)   05/14/24 69.7 kg (153 lb 9.6 oz)   04/22/24 68 kg (150 lb)   04/19/24 68 kg (149 lb 14.6 oz)   04/16/24 69.4 kg (153 lb)   04/13/24 67.7 kg (149 lb 4 oz)   03/25/24 66.7 kg (147 lb)   03/05/24 67.1 kg (147 lb 14.9 oz)   02/14/24 75.7 kg (166 lb 14.2 oz)   02/09/24 76.9 kg (169 lb 8.5 oz)        Weight Change %:  Weight History / % Weight Change: Variable weights per available history, likely associated with fluid changes (dialysis), patient reports UBW is ~157 lbs.  Significant Weight Loss: No          Nutrition Focused Physical Exam " Findings:  defer: patient covered with blankets, no visible losses present    Physical Findings:  Skin: Negative    Estimated Needs:   Total Energy Estimated Needs (kCal):  (2119-3400)  Method for Estimating Needs: 30-32, IBW  Total Protein Estimated Needs (g):  (65-75)  Method for Estimating Needs: 1.3-1.5, IBW     Method for Estimating Needs: per physician        Nutrition Diagnosis   Nutrition Diagnosis:       Nutrition Diagnosis  Patient has Nutrition Diagnosis: Yes  Diagnosis Status (1): New  Nutrition Diagnosis 1: Increased nutrient needs  Related to (1): wound healing, known losses from dialysis, upcoming surgical procedure  As Evidenced by (1): bilateral foot wounds  Additional Assessment Information (1): predicted catabolic state secondary to upcoming surgery       Nutrition Interventions/Recommendations   Nutrition Interventions and Recommendations:        Nutrition Prescription:  Individualized Nutrition Prescription Provided for : Consider diet liberalization to promote oral intake.   Trial Ensure clear liquid and Ensure max supplements daily.   Meal/protein intake encouragement given.        Nutrition Interventions:   Food and/or Nutrient Delivery Interventions  Interventions: Meals and snacks, Medical food supplement  Meals and Snacks: Mineral-modified diet  Goal: >75% intake of meals  Medical Food Supplement: Commercial beverage  Goal: >75% intake of Ensure clear and Ensure max    Coordination of Nutrition Care by a Nutrition Professional  Collaboration and Referral of Nutrition Care: Collaboration by nutrition professional with other providers  Goal: Discussed via secure chat liberalizing potassium restriction with Dr. Christie    Nutrition Education:   Education Documentation  No documentation found.      Nutrition Counseling  Counseling Theoretical Approach: Other (Comment)  Goal: Reviewed current renal diet, reviewed supplement options, reviewed need for adequate protein-calorie intake to  promote healing.       Nutrition Monitoring and Evaluation   Monitoring/Evaluation:   Food/Nutrient Related History Monitoring  Monitoring and Evaluation Plan: Energy intake  Energy Intake: Estimated energy intake  Criteria: meet >75% of estimated needs from diet and ONS    Body Composition/Growth/Weight History  Monitoring and Evaluation Plan: Weight  Weight: Weight change  Criteria: Maintain stable weight    Biochemical Data, Medical Tests and Procedures  Monitoring and Evaluation Plan: Electrolyte/renal panel, Glucose/endocrine profile  Electrolyte and Renal Panel: Potassium, Phosphorus, BUN, Creatinine, Sodium  Criteria: WNL  Glucose/Endocrine Profile: Glucose, casual  Criteria: WNL    Nutrition Focused Physical Findings  Monitoring and Evaluation Plan: Skin  Skin: Impaired wound healing  Criteria: Promote healing            Time Spent/Follow-up Reminder:   Follow Up  Time Spent (min): 45 minutes  Last Date of Nutrition Visit: 05/23/24  Nutrition Follow-Up Needed?: Dietitian to reassess per policy  Follow up Comment: 5/28

## 2024-05-24 ENCOUNTER — APPOINTMENT (OUTPATIENT)
Dept: DIALYSIS | Facility: HOSPITAL | Age: 79
End: 2024-05-24
Payer: MEDICARE

## 2024-05-24 LAB
GLUCOSE BLD MANUAL STRIP-MCNC: 123 MG/DL (ref 74–99)
GLUCOSE BLD MANUAL STRIP-MCNC: 128 MG/DL (ref 74–99)
GLUCOSE BLD MANUAL STRIP-MCNC: 165 MG/DL (ref 74–99)
GLUCOSE BLD MANUAL STRIP-MCNC: 181 MG/DL (ref 74–99)
HOLD SPECIMEN: NORMAL

## 2024-05-24 PROCEDURE — 82947 ASSAY GLUCOSE BLOOD QUANT: CPT

## 2024-05-24 PROCEDURE — 99233 SBSQ HOSP IP/OBS HIGH 50: CPT | Performed by: INTERNAL MEDICINE

## 2024-05-24 PROCEDURE — 2500000004 HC RX 250 GENERAL PHARMACY W/ HCPCS (ALT 636 FOR OP/ED): Mod: JZ | Performed by: INTERNAL MEDICINE

## 2024-05-24 PROCEDURE — 2500000001 HC RX 250 WO HCPCS SELF ADMINISTERED DRUGS (ALT 637 FOR MEDICARE OP): Performed by: SURGERY

## 2024-05-24 PROCEDURE — 2500000001 HC RX 250 WO HCPCS SELF ADMINISTERED DRUGS (ALT 637 FOR MEDICARE OP): Performed by: STUDENT IN AN ORGANIZED HEALTH CARE EDUCATION/TRAINING PROGRAM

## 2024-05-24 PROCEDURE — 2500000004 HC RX 250 GENERAL PHARMACY W/ HCPCS (ALT 636 FOR OP/ED): Performed by: INTERNAL MEDICINE

## 2024-05-24 PROCEDURE — 2500000004 HC RX 250 GENERAL PHARMACY W/ HCPCS (ALT 636 FOR OP/ED): Performed by: NURSE PRACTITIONER

## 2024-05-24 PROCEDURE — 2500000001 HC RX 250 WO HCPCS SELF ADMINISTERED DRUGS (ALT 637 FOR MEDICARE OP): Performed by: INTERNAL MEDICINE

## 2024-05-24 PROCEDURE — A4217 STERILE WATER/SALINE, 500 ML: HCPCS | Performed by: SURGERY

## 2024-05-24 PROCEDURE — 2060000001 HC INTERMEDIATE ICU ROOM DAILY

## 2024-05-24 PROCEDURE — 2500000004 HC RX 250 GENERAL PHARMACY W/ HCPCS (ALT 636 FOR OP/ED): Performed by: SURGERY

## 2024-05-24 PROCEDURE — 8010000001 HC DIALYSIS - HEMODIALYSIS PER DAY

## 2024-05-24 RX ORDER — AMLODIPINE BESYLATE 10 MG/1
10 TABLET ORAL DAILY
Status: DISCONTINUED | OUTPATIENT
Start: 2024-05-24 | End: 2024-05-28 | Stop reason: HOSPADM

## 2024-05-24 RX ORDER — CARVEDILOL 12.5 MG/1
12.5 TABLET ORAL 2 TIMES DAILY
Status: DISCONTINUED | OUTPATIENT
Start: 2024-05-24 | End: 2024-05-28 | Stop reason: HOSPADM

## 2024-05-24 RX ADMIN — INSULIN LISPRO 1 UNITS: 100 INJECTION, SOLUTION INTRAVENOUS; SUBCUTANEOUS at 17:08

## 2024-05-24 RX ADMIN — CEFTRIAXONE SODIUM 2 G: 2 INJECTION, SOLUTION INTRAVENOUS at 17:08

## 2024-05-24 RX ADMIN — METRONIDAZOLE 500 MG: 500 INJECTION, SOLUTION INTRAVENOUS at 06:04

## 2024-05-24 RX ADMIN — Medication 50 MG OF ELEMENTAL ZINC: at 11:21

## 2024-05-24 RX ADMIN — MULTIVITAMIN TABLET 1 TABLET: TABLET at 11:19

## 2024-05-24 RX ADMIN — PANTOPRAZOLE SODIUM 40 MG: 40 TABLET, DELAYED RELEASE ORAL at 06:04

## 2024-05-24 RX ADMIN — OXYCODONE HYDROCHLORIDE AND ACETAMINOPHEN 250 MG: 500 TABLET ORAL at 11:20

## 2024-05-24 RX ADMIN — METRONIDAZOLE 500 MG: 500 INJECTION, SOLUTION INTRAVENOUS at 14:37

## 2024-05-24 RX ADMIN — CARVEDILOL 12.5 MG: 12.5 TABLET, FILM COATED ORAL at 21:26

## 2024-05-24 RX ADMIN — METRONIDAZOLE 500 MG: 500 INJECTION, SOLUTION INTRAVENOUS at 21:26

## 2024-05-24 RX ADMIN — PRAVASTATIN SODIUM 40 MG: 40 TABLET ORAL at 21:26

## 2024-05-24 RX ADMIN — PREGABALIN 100 MG: 25 CAPSULE ORAL at 21:26

## 2024-05-24 RX ADMIN — HEPARIN SODIUM 5000 UNITS: 5000 INJECTION INTRAVENOUS; SUBCUTANEOUS at 06:04

## 2024-05-24 RX ADMIN — SODIUM CHLORIDE 10 ML: 900 IRRIGANT IRRIGATION at 09:00

## 2024-05-24 RX ADMIN — HEPARIN SODIUM 5000 UNITS: 5000 INJECTION INTRAVENOUS; SUBCUTANEOUS at 14:37

## 2024-05-24 RX ADMIN — AMLODIPINE BESYLATE 10 MG: 10 TABLET ORAL at 11:20

## 2024-05-24 RX ADMIN — HEPARIN SODIUM 5000 UNITS: 5000 INJECTION INTRAVENOUS; SUBCUTANEOUS at 21:26

## 2024-05-24 RX ADMIN — SODIUM CHLORIDE 10 ML: 900 IRRIGANT IRRIGATION at 21:00

## 2024-05-24 RX ADMIN — CARVEDILOL 12.5 MG: 12.5 TABLET, FILM COATED ORAL at 11:20

## 2024-05-24 RX ADMIN — PREGABALIN 100 MG: 25 CAPSULE ORAL at 11:20

## 2024-05-24 ASSESSMENT — COGNITIVE AND FUNCTIONAL STATUS - GENERAL
DRESSING REGULAR UPPER BODY CLOTHING: A LITTLE
MOBILITY SCORE: 14
CLIMB 3 TO 5 STEPS WITH RAILING: A LOT
TOILETING: A LITTLE
DRESSING REGULAR LOWER BODY CLOTHING: A LITTLE
DAILY ACTIVITIY SCORE: 19
STANDING UP FROM CHAIR USING ARMS: A LITTLE
DAILY ACTIVITIY SCORE: 21
STANDING UP FROM CHAIR USING ARMS: A LOT
MOVING FROM LYING ON BACK TO SITTING ON SIDE OF FLAT BED WITH BEDRAILS: A LITTLE
HELP NEEDED FOR BATHING: A LITTLE
TURNING FROM BACK TO SIDE WHILE IN FLAT BAD: A LITTLE
WALKING IN HOSPITAL ROOM: A LITTLE
PERSONAL GROOMING: A LITTLE
MOBILITY SCORE: 20
DRESSING REGULAR LOWER BODY CLOTHING: A LITTLE
HELP NEEDED FOR BATHING: A LITTLE
CLIMB 3 TO 5 STEPS WITH RAILING: A LOT
TOILETING: A LITTLE
WALKING IN HOSPITAL ROOM: A LOT
MOVING TO AND FROM BED TO CHAIR: A LOT

## 2024-05-24 ASSESSMENT — PAIN SCALES - GENERAL
PAINLEVEL_OUTOF10: 0 - NO PAIN

## 2024-05-24 ASSESSMENT — PAIN - FUNCTIONAL ASSESSMENT
PAIN_FUNCTIONAL_ASSESSMENT: 0-10
PAIN_FUNCTIONAL_ASSESSMENT: 0-10

## 2024-05-24 NOTE — PROGRESS NOTES
"Tana Hargrove is a 79 y.o. female on day 4 of admission presenting with Fall, initial encounter.    Subjective   Denies abd pain.  Kyler PO intake.         Objective   BP (!) 187/68 (BP Location: Right arm, Patient Position: Lying) Comment: RN notified  Pulse 72   Temp 36.7 °C (98 °F) (Temporal)   Resp 16   Ht 1.549 m (5' 1\")   Wt 74.8 kg (164 lb 14.5 oz)   SpO2 94%   BMI 31.16 kg/m²   I/O last 3 completed shifts:  In: 1510 (20.2 mL/kg) [P.O.:1410; IV Piggyback:100]  Out: 2650 (35.4 mL/kg) [Urine:2475 (0.9 mL/kg/hr); Drains:175]  Weight: 74.8 kg   No intake/output data recorded.  Physical Exam  Vitals reviewed.   Abdominal:      Palpations: Abdomen is soft.      Comments: RUQ drain with bile-tinged serous fluid   Skin:     General: Skin is warm.   Neurological:      General: No focal deficit present.      Mental Status: She is alert.   Psychiatric:         Mood and Affect: Mood normal.     Results for orders placed or performed during the hospital encounter of 05/19/24 (from the past 24 hour(s))   POCT GLUCOSE   Result Value Ref Range    POCT Glucose 191 (H) 74 - 99 mg/dL   POCT GLUCOSE   Result Value Ref Range    POCT Glucose 133 (H) 74 - 99 mg/dL   Blood Gas Venous Full Panel   Result Value Ref Range    POCT pH, Venous 7.45 (H) 7.33 - 7.43 pH    POCT pCO2, Venous 40 (L) 41 - 51 mm Hg    POCT pO2, Venous 51 (H) 35 - 45 mm Hg    POCT SO2, Venous 87 (H) 45 - 75 %    POCT Oxy Hemoglobin, Venous 84.8 (H) 45.0 - 75.0 %    POCT Hematocrit Calculated, Venous 24.0 (L) 36.0 - 46.0 %    POCT Sodium, Venous 133 (L) 136 - 145 mmol/L    POCT Potassium, Venous 4.0 3.5 - 5.3 mmol/L    POCT Chloride, Venous 103 98 - 107 mmol/L    POCT Ionized Calicum, Venous 1.28 1.10 - 1.33 mmol/L    POCT Glucose, Venous 121 (H) 74 - 99 mg/dL    POCT Lactate, Venous 1.3 0.4 - 2.0 mmol/L    POCT Base Excess, Venous 3.5 (H) -2.0 - 3.0 mmol/L    POCT HCO3 Calculated, Venous 27.8 (H) 22.0 - 26.0 mmol/L    POCT Hemoglobin, Venous 7.9 (L) 12.0 - " 16.0 g/dL    POCT Anion Gap, Venous 6.0 (L) 10.0 - 25.0 mmol/L    Patient Temperature 37.0 degrees Celsius    FiO2 0 %   POCT GLUCOSE   Result Value Ref Range    POCT Glucose 161 (H) 74 - 99 mg/dL   Urinalysis with Reflex Culture and Microscopic   Result Value Ref Range    Color, Urine Light-Yellow Light-Yellow, Yellow, Dark-Yellow    Appearance, Urine Clear Clear    Specific Gravity, Urine 1.006 1.005 - 1.035    pH, Urine 7.0 5.0, 5.5, 6.0, 6.5, 7.0, 7.5, 8.0    Protein, Urine 70 (1+) (A) NEGATIVE, 10 (TRACE), 20 (TRACE) mg/dL    Glucose, Urine Normal Normal mg/dL    Blood, Urine NEGATIVE NEGATIVE    Ketones, Urine NEGATIVE NEGATIVE mg/dL    Bilirubin, Urine NEGATIVE NEGATIVE    Urobilinogen, Urine Normal Normal mg/dL    Nitrite, Urine NEGATIVE NEGATIVE    Leukocyte Esterase, Urine NEGATIVE NEGATIVE   Extra Urine Gray Tube   Result Value Ref Range    Extra Tube Hold for add-ons.    Urinalysis Microscopic   Result Value Ref Range    WBC, Urine NONE 1-5, NONE /HPF    RBC, Urine NONE NONE, 1-2, 3-5 /HPF    Squamous Epithelial Cells, Urine 1-9 (SPARSE) Reference range not established. /HPF   POCT GLUCOSE   Result Value Ref Range    POCT Glucose 128 (H) 74 - 99 mg/dL     *Note: Due to a large number of results and/or encounters for the requested time period, some results have not been displayed. A complete set of results can be found in Results Review.         Assessment/Plan   Principal Problem:    Fall, initial encounter  Active Problems:    Anxiety    Chronic diastolic heart failure of unknown etiology (Multi)    Hypertension, essential    Stage 4 chronic kidney disease (Multi)    Gout    Repeated falls    Paroxysmal atrial fibrillation (Multi)    Normocytic anemia    Essential (primary) hypertension    HLD (hyperlipidemia)    Type 2 diabetes mellitus without complications (Multi)    ESRD (end stage renal disease) on dialysis (Multi)    Elevated troponin    Elevated bilirubin    Hyperglycemia    Elevated lipase  79F  with chronic cholecystitis with perc chidi tube   - plan for OR 6th June  - aggressive PT, optimise nutrition  - routine drain flushing   - dc as per primary service    I spent 35 minutes in the professional and overall care of this patient.      Ronald Grimes MD

## 2024-05-24 NOTE — DOCUMENTATION CLARIFICATION NOTE
PATIENT:               YVONNE BAEZ  ACCT #:                  [id number]  MRN:                       [id number]  :                       1945  ADMIT DATE:       2024 11:14 AM  DISCH DATE:  RESPONDING PROVIDER #:        [id number]          PROVIDER RESPONSE TEXT:    Sepsis due to acute on chronic cholecystitis    CDI QUERY TEXT:    Clarification        Instruction:    Based on your assessment of the patient and the clinical information, please provide the requested documentation by clicking on the appropriate radio button and enter any additional information if prompted.    Question: Please further clarify the most likely etiology of Sepsis on admission after final work up    When answering this query, please exercise your independent professional judgment. The fact that a question is being asked, does not imply that any particular answer is desired or expected.    The patient's clinical indicators include:  Clinical Information:  - 78yo female admitted with Sepsis, repeated falls, elevated troponin/bilirubin/glucose/lipase. PMH  includes ESRD, recent GB drain placed, CAD, HTN, COPD, DM, MDS, dCHF, Afib, gout and neuropathy.    Clinical Indicators:  - VS on arrival : 100.4 F, 98 bpm, 20 rr, 145/53 and 180/71, 93 O2 sat on RA    - Labs on arrival:  Wbc 9.6, hgb 7.5, bs 165, cr 2.38, alk phos 292, ast 278, alt 296, tbili 4.7, lip 146, trop 62, ck 172, la 1.8    - CT AP  : Percutaneous drainage tube noted within the maria isabel hepatis. This appears to be related to a decompressed gallbladder versus positioning status post cholecystectomy with residual fluid within the gallbladder fossa. Clinical correlation for operative history recommended.    - OLD Xray report : 24 Uneventful 8 Bengali cholecystostomy tube placement with no immediate complication. Tube Mgmt  Please leave cholecystostomy tube to external gravity drainage and flush forward (towards patient and towards drainage bag) with 5 cc normal saline  solution daily to maintain tube patency. Cholecystostomy maintenance including tube check and management is recommended in 8-12 weeks if the gallbladder is not removed surgically.    Treatment: Rocephin, Flagyl, Vancomycin.    Risk Factors: Cholecystitis with current cholecystostomy drain tube.  Options provided:  -- Sepsis due to acute on chronic cholecystitis  -- Sepsis due to chronic cholecystitis  -- Sepsis due to GB drain tube  -- Sepsis due to other, Please specify Sepsis source below  -- Sepsis ruled out  -- Other - I will add my own diagnosis  -- Refer to Clinical Documentation Reviewer    Query created by: Caitlin Salcido on 5/24/2024 10:50 AM      Electronically signed by:  KELLY HUGHES MD PhD 5/24/2024 1:22 PM

## 2024-05-24 NOTE — CARE PLAN
The patient's goals for the shift include      The clinical goals for the shift include Maintain H/H > 7.0    Over the shift, the patient did not make progress toward the following goals. Barriers to progression include history of low Hemoglobin. Recommendations to address these barriers include frequent labwork.

## 2024-05-24 NOTE — PROGRESS NOTES
Alerted by Dr Herbert that patient's son is insistent on admission to SNF. Patient is agreeable but only wants to go to [facility]. Will have MANUELA Sale[facility] send referral.     [facility] is able to accept. They will need to switch her to clinic to [place] location for time being while in the SNF, they had to adjust this last time patient was with them. PT OT notes are still pending as patient was in HD. Will send PT OT notes and start auth with [facility] when able. [facility] following.

## 2024-05-24 NOTE — PROGRESS NOTES
[facility] INFECTIOUS DISEASE PROGRESS NOTE    Patient Name: Tana Hargrove  MRN: [id number]    INTERVAL HISTORY:   Overnight events noted.  The patient was lethargic last night.  No fevers or chills with a maximum temperature of 99.7 °F.    Patient Active Problem List   Diagnosis    Lumbago    A-fib (Multi)    Anxiety    Chronic diastolic heart failure of unknown etiology (Multi)    Cervical spondylosis without myelopathy    Coronary artery disease    Degeneration of intervertebral disc of lumbar region    Hypertension, essential    Frequent falls    GERD (gastroesophageal reflux disease)    Hyperlipidemia    Inability to ambulate due to multiple joints    Jerking movements of extremities    Spinal stenosis of lumbar region    Lumbar spondylosis    Macrocytic anemia    Myelodysplastic syndrome (Multi)    Myofascial pain syndrome    Occasional tremors    Osteoporosis    Stage 4 chronic kidney disease (Multi)    Weakness generalized    Gout    Scoliosis of lumbar region due to degenerative disease of spine in adult    Depression    Lower extremity edema    Diabetes mellitus with complication (Multi)    Primary osteoarthritis of right knee    Fall    Acute kidney failure, unspecified (CMS-[facility])    Muscle weakness (generalized)    Primary osteoarthritis    Repeated falls    Paroxysmal atrial fibrillation (Multi)    Chronic diastolic (congestive) heart failure (Multi)    Chronic obstructive pulmonary disease, unspecified (Multi)    CKD (chronic kidney disease)    Normocytic anemia    Essential (primary) hypertension    HLD (hyperlipidemia)    Type 2 diabetes mellitus with hyperglycemia, without long-term current use of insulin (Multi)    Type 2 diabetes mellitus without complications (Multi)    Obesity (BMI 30-39.9)    Cellulitis of toe of right foot    Acute kidney injury (nontraumatic) (CMS-[facility])    ESRD (end stage renal disease) on dialysis (Multi)    Anemia due to chronic kidney disease, on chronic dialysis (Multi)     "Elevated troponin    Thickening of wall of gallbladder    Allergy, unspecified, sequela    Anaphylactic shock, unspecified, sequela    Articular cartilage disorder of hip    Bone marrow disorder    Coagulation defect, unspecified (Multi)    Contact with and (suspected) exposure to covid-19    Fracture of bone of hip (Multi)    History of anemia    Hyperkalemia    Pain of lower extremity    Pain, unspecified    Plantar fasciitis    Pneumonia due to infectious organism    Chest pain    Acute pulmonary edema (Multi)    Chronic cholecystitis    Fall, initial encounter    Elevated bilirubin    Hyperglycemia    Elevated lipase        ASSESSMENT:   Klebsiella pneumoniae/ variicola bacteremia, source ?urinary vs GI   - CXR, lungs are clear. No acute process   - CT A/P- Percutaneous drainage tube noted within the maria isabel hepatis. This  appears to be related to a decompressed gallbladder versus positioning  status post cholecystectomy with residual fluid within the gallbladder  fossa. Clinical correlation for operative history recommended.  - US RUQ; \"Sludge and stones within the gallbladder with edematous wall  thickening.  No biliary dilatation is appreciated. Increased echogenicity and thinning of the right renal cortex consistent with intrinsic renal disease.  No hydronephrosis\".     Elevated LFT's   - Gradually down trending   - Hepatitis panel negative   - US abdomen shows sludge and stones within the gallbladder, no biliary dilatation      ESRD on Hemodialysis   - Renal following for HD management      T2DM  - SSI per primary      Anemia  - S/P 1 unit PRBC transfusion on 5/20  - Monitoring H&H, transfuse as needed per primary     HTN  HLD        PLAN  C/W Ceftriaxone, increase to 2G in setting of bacteremia   C/w Metronidazole   Stop Vancomycin   Follow repeat blood cx   Check urine cx  Reviewed VBG  Monitor temps and counts   Monitor mentation and clinical response to antibiotics alone.  If continues to have mentation " changes or fevers or uptrending LFTs may need to consider surgical reevaluation  Discussed with Dr. Herbert    Discharge planning once improved on oral Augmentin till 6/6/2024    MEDICATIONS: reviewed.    Current Facility-Administered Medications:     acetaminophen (Tylenol) tablet 650 mg, 650 mg, oral, q6h PRN, Antwan Leavitt MD, 650 mg at 05/22/24 1432    amLODIPine (Norvasc) tablet 5 mg, 5 mg, oral, Daily, Saurabh Vo DO, 5 mg at 05/23/24 0916    ascorbic acid (Vitamin C) tablet 250 mg, 250 mg, oral, Daily, Ronald Grimes MD, 250 mg at 05/23/24 1133    bisacodyl (Dulcolax) EC tablet 10 mg, 10 mg, oral, Daily PRN, Saurabh Vo DO    butalbital-acetaminophen-caff -40 mg per tablet 1 tablet, 1 tablet, oral, q4h PRN, Kate Edmond MD, 1 tablet at 05/23/24 2129    cefTRIAXone (Rocephin) 2 g in dextrose 5% in water (D5W) 50 mL, 2 g, intravenous, q24h, Gerri Manning, GAB-CNP, Stopped at 05/23/24 1819    dextrose 50 % injection 12.5 g, 12.5 g, intravenous, q15 min PRN, Saurabh Vo DO    dextrose 50 % injection 25 g, 25 g, intravenous, q15 min PRN, Saurabh Vo DO    glucagon (Glucagen) injection 1 mg, 1 mg, intramuscular, q15 min PRN, Saurabh Vo DO    glucagon (Glucagen) injection 1 mg, 1 mg, intramuscular, q15 min PRN, Saurabh Vo DO    guaiFENesin (Mucinex) 12 hr tablet 600 mg, 600 mg, oral, q12h PRN, Saurabh Vo DO    heparin (porcine) injection 5,000 Units, 5,000 Units, subcutaneous, q8h JOSE DE JESUS, Kate Edmond MD, 5,000 Units at 05/24/24 0604    heparin 1,000 unit/mL injection 2,000 Units, 2,000 Units, intra-catheter, After Dialysis, Pinky Christie DO, 1,600 Units at 05/22/24 1212    heparin 1,000 unit/mL injection 2,000 Units, 2,000 Units, intra-catheter, After Dialysis, Pinky Christie DO, 1,600 Units at 05/22/24 1212    hydrALAZINE (Apresoline) injection 10 mg, 10 mg, intravenous, q6h PRN, Saurabh Vo, , 10 mg at 05/23/24 0430    hydrOXYzine HCL (Atarax) tablet  "25 mg, 25 mg, oral, q6h PRN, Pollo Herbert MD PhD, 25 mg at 24 1133    insulin lispro (HumaLOG) injection 0-5 Units, 0-5 Units, subcutaneous, TID, Saurabh Vo DO, 2 Units at 24 1133    melatonin tablet 3 mg, 3 mg, oral, Nightly PRN, Saurabh Vo DO    metoprolol tartrate (Lopressor) tablet 25 mg, 25 mg, oral, BID, Saurabh Vo DO, 25 mg at 24    metroNIDAZOLE (Flagyl) 500 mg in NaCl (iso-os) 100 mL, 500 mg, intravenous, q8h, Pollo Herbert MD PhD, Stopped at 24 0704    multivitamin 1 tablet, 1 tablet, oral, Daily, Ronald Grimes MD, 1 tablet at 24 1133    ondansetron (Zofran) tablet 4 mg, 4 mg, oral, q8h PRN **OR** ondansetron (Zofran) injection 4 mg, 4 mg, intravenous, q8h PRN, Saurabh Vo DO    pantoprazole (ProtoNix) EC tablet 40 mg, 40 mg, oral, Daily before breakfast, 40 mg at 24 0604 **OR** pantoprazole (ProtoNix) injection 40 mg, 40 mg, intravenous, Daily before breakfast, Saurabh Vo DO, 40 mg at 24 0617    pravastatin (Pravachol) tablet 40 mg, 40 mg, oral, Nightly, Saurabh Vo DO, 40 mg at 24    pregabalin (Lyrica) capsule 100 mg, 100 mg, oral, BID, Saurabh Vo DO, 100 mg at 24    sodium chloride 0.9 % irrigation solution 10 mL, 10 mL, intra-catheter, BID, Ronald Grimes MD, 10 mL at 24 2100    zinc sulfate (Zincate) capsule 50 mg of elemental zinc, 50 mg of elemental zinc, oral, Daily, Ronald Grimes MD, 50 mg of elemental zinc at 24 1440     PHYSICAL EXAM:  Vital signs: BP (!) 187/68 (BP Location: Right arm, Patient Position: Lying) Comment: RN notified  Pulse 72   Temp 36.7 °C (98 °F) (Temporal)   Resp 16   Ht 1.549 m (5' 1\")   Wt 74.8 kg (164 lb 14.5 oz)   SpO2 94%   BMI 31.16 kg/m²   Temp (24hrs), Av.9 °C (98.5 °F), Min:36.2 °C (97.1 °F), Max:37.6 °C (99.7 °F)    General: alert, oriented, NAD  Lungs: bilaterally clear to auscultation  Heart: regular rate and " rhythm  Abdomen: soft, non tender, non distended, BS+  Extremities: no edema  No rashes  No joint inflammation  Neck supple  Lines ok  No CVAT              Labs:    Results for orders placed or performed during the hospital encounter of 05/19/24 (from the past 96 hour(s))   POCT GLUCOSE   Result Value Ref Range    POCT Glucose 98 74 - 99 mg/dL   Protime-INR   Result Value Ref Range    Protime 17.3 (H) 9.8 - 12.8 seconds    INR 1.5 (H) 0.9 - 1.1   APTT   Result Value Ref Range    aPTT 32 27 - 38 seconds   POCT GLUCOSE   Result Value Ref Range    POCT Glucose 209 (H) 74 - 99 mg/dL   Hepatitis panel, acute   Result Value Ref Range    Hepatitis B Surface AG Nonreactive Nonreactive    Hepatitis A  AB- IgM Nonreactive Nonreactive    Hepatitis B Core AB; IgM Nonreactive Nonreactive    Hepatitis C AB Nonreactive Nonreactive   Blood Culture    Specimen: Dialysis; Blood culture   Result Value Ref Range    Blood Culture No growth at 3 days    Blood Culture    Specimen: Peripheral Venipuncture; Blood culture   Result Value Ref Range    Blood Culture No growth at 3 days    Blood Culture    Specimen: Peripheral Venipuncture; Blood culture   Result Value Ref Range    Blood Culture Klebsiella pneumoniae/variicola (AA)     BLOOD CULTURE BOTTLE  Positive Aerobic Bottle     Gram Stain Gram negative bacilli (AA)        Susceptibility    Klebsiella pneumoniae/variicola - MICROSCAN     Amoxicillin/Clavulanate  Susceptible mcg/mL     Ampicillin  Resistant mcg/mL     Ampicillin/Sulbactam  Susceptible mcg/mL     Cefazolin  Susceptible mcg/mL     Ciprofloxacin  Intermediate mcg/mL     Gentamicin  Susceptible mcg/mL     Levofloxacin  Susceptible mcg/mL     Piperacillin/Tazobactam  Susceptible mcg/mL     Trimethoprim/Sulfamethoxazole  Susceptible mcg/mL   POCT GLUCOSE   Result Value Ref Range    POCT Glucose 159 (H) 74 - 99 mg/dL   POCT GLUCOSE   Result Value Ref Range    POCT Glucose 152 (H) 74 - 99 mg/dL   CBC and Auto Differential   Result  Value Ref Range    WBC 3.6 (L) 4.4 - 11.3 x10*3/uL    nRBC 0.6 (H) 0.0 - 0.0 /100 WBCs    RBC 2.68 (L) 4.00 - 5.20 x10*6/uL    Hemoglobin 8.1 (L) 12.0 - 16.0 g/dL    Hematocrit 23.9 (L) 36.0 - 46.0 %    MCV 89 80 - 100 fL    MCH 30.2 26.0 - 34.0 pg    MCHC 33.9 32.0 - 36.0 g/dL    RDW 26.7 (H) 11.5 - 14.5 %    Platelets 189 150 - 450 x10*3/uL    Neutrophils % 66.3 40.0 - 80.0 %    Immature Granulocytes %, Automated 0.8 0.0 - 0.9 %    Lymphocytes % 14.9 13.0 - 44.0 %    Monocytes % 11.3 2.0 - 10.0 %    Eosinophils % 6.1 0.0 - 6.0 %    Basophils % 0.6 0.0 - 2.0 %    Neutrophils Absolute 2.41 1.60 - 5.50 x10*3/uL    Immature Granulocytes Absolute, Automated 0.03 0.00 - 0.50 x10*3/uL    Lymphocytes Absolute 0.54 (L) 0.80 - 3.00 x10*3/uL    Monocytes Absolute 0.41 0.05 - 0.80 x10*3/uL    Eosinophils Absolute 0.22 0.00 - 0.40 x10*3/uL    Basophils Absolute 0.02 0.00 - 0.10 x10*3/uL   Magnesium   Result Value Ref Range    Magnesium 1.64 1.60 - 2.40 mg/dL   Comprehensive metabolic panel   Result Value Ref Range    Glucose 190 (H) 74 - 99 mg/dL    Sodium 134 (L) 136 - 145 mmol/L    Potassium 2.9 (LL) 3.5 - 5.3 mmol/L    Chloride 98 98 - 107 mmol/L    Bicarbonate 29 21 - 32 mmol/L    Anion Gap 10 10 - 20 mmol/L    Urea Nitrogen 15 6 - 23 mg/dL    Creatinine 2.06 (H) 0.50 - 1.05 mg/dL    eGFR 24 (L) >60 mL/min/1.73m*2    Calcium 9.0 8.6 - 10.3 mg/dL    Albumin 3.4 3.4 - 5.0 g/dL    Alkaline Phosphatase 255 (H) 33 - 136 U/L    Total Protein 6.0 (L) 6.4 - 8.2 g/dL    AST 45 (H) 9 - 39 U/L    Bilirubin, Total 1.0 0.0 - 1.2 mg/dL     (H) 7 - 45 U/L   Morphology   Result Value Ref Range    RBC Morphology See Below     Polychromasia Mild     Hypochromia Mild     Ovalocytes Few     Basophilic Stippling Present     Giant Platelets Few    POCT GLUCOSE   Result Value Ref Range    POCT Glucose 201 (H) 74 - 99 mg/dL   POCT GLUCOSE   Result Value Ref Range    POCT Glucose 165 (H) 74 - 99 mg/dL   POCT GLUCOSE   Result Value Ref Range     POCT Glucose 210 (H) 74 - 99 mg/dL   Comprehensive Metabolic Panel   Result Value Ref Range    Glucose 120 (H) 74 - 99 mg/dL    Sodium 137 136 - 145 mmol/L    Potassium 3.6 3.5 - 5.3 mmol/L    Chloride 103 98 - 107 mmol/L    Bicarbonate 26 21 - 32 mmol/L    Anion Gap 12 10 - 20 mmol/L    Urea Nitrogen 25 (H) 6 - 23 mg/dL    Creatinine 2.32 (H) 0.50 - 1.05 mg/dL    eGFR 21 (L) >60 mL/min/1.73m*2    Calcium 9.1 8.6 - 10.3 mg/dL    Albumin 3.1 (L) 3.4 - 5.0 g/dL    Alkaline Phosphatase 216 (H) 33 - 136 U/L    Total Protein 5.6 (L) 6.4 - 8.2 g/dL    AST 26 9 - 39 U/L    Bilirubin, Total 0.7 0.0 - 1.2 mg/dL    ALT 75 (H) 7 - 45 U/L   CBC   Result Value Ref Range    WBC 4.4 4.4 - 11.3 x10*3/uL    nRBC 0.0 0.0 - 0.0 /100 WBCs    RBC 2.52 (L) 4.00 - 5.20 x10*6/uL    Hemoglobin 7.4 (L) 12.0 - 16.0 g/dL    Hematocrit 22.1 (L) 36.0 - 46.0 %    MCV 88 80 - 100 fL    MCH 29.4 26.0 - 34.0 pg    MCHC 33.5 32.0 - 36.0 g/dL    RDW 26.3 (H) 11.5 - 14.5 %    Platelets 195 150 - 450 x10*3/uL   Magnesium   Result Value Ref Range    Magnesium 1.72 1.60 - 2.40 mg/dL   POCT GLUCOSE   Result Value Ref Range    POCT Glucose 138 (H) 74 - 99 mg/dL   Blood Culture    Specimen: Peripheral Venipuncture; Blood culture   Result Value Ref Range    Blood Culture No growth at 1 day    Blood Culture    Specimen: Peripheral Venipuncture; Blood culture   Result Value Ref Range    Blood Culture No growth at 1 day    POCT GLUCOSE   Result Value Ref Range    POCT Glucose 230 (H) 74 - 99 mg/dL   Urine culture    Specimen: Clean Catch/Voided; Urine   Result Value Ref Range    Urine Culture No significant growth    POCT GLUCOSE   Result Value Ref Range    POCT Glucose 160 (H) 74 - 99 mg/dL   CBC and Auto Differential   Result Value Ref Range    WBC 3.9 (L) 4.4 - 11.3 x10*3/uL    nRBC 0.5 (H) 0.0 - 0.0 /100 WBCs    RBC 2.68 (L) 4.00 - 5.20 x10*6/uL    Hemoglobin 7.7 (L) 12.0 - 16.0 g/dL    Hematocrit 24.2 (L) 36.0 - 46.0 %    MCV 90 80 - 100 fL    MCH 28.7  26.0 - 34.0 pg    MCHC 31.8 (L) 32.0 - 36.0 g/dL    RDW 25.7 (H) 11.5 - 14.5 %    Platelets 196 150 - 450 x10*3/uL    Neutrophils % 46.6 40.0 - 80.0 %    Immature Granulocytes %, Automated 0.8 0.0 - 0.9 %    Lymphocytes % 34.2 13.0 - 44.0 %    Monocytes % 9.6 2.0 - 10.0 %    Eosinophils % 8.3 0.0 - 6.0 %    Basophils % 0.5 0.0 - 2.0 %    Neutrophils Absolute 1.80 1.60 - 5.50 x10*3/uL    Immature Granulocytes Absolute, Automated 0.03 0.00 - 0.50 x10*3/uL    Lymphocytes Absolute 1.32 0.80 - 3.00 x10*3/uL    Monocytes Absolute 0.37 0.05 - 0.80 x10*3/uL    Eosinophils Absolute 0.32 0.00 - 0.40 x10*3/uL    Basophils Absolute 0.02 0.00 - 0.10 x10*3/uL   Comprehensive Metabolic Panel   Result Value Ref Range    Glucose 109 (H) 74 - 99 mg/dL    Sodium 138 136 - 145 mmol/L    Potassium 3.6 3.5 - 5.3 mmol/L    Chloride 103 98 - 107 mmol/L    Bicarbonate 29 21 - 32 mmol/L    Anion Gap 10 10 - 20 mmol/L    Urea Nitrogen 13 6 - 23 mg/dL    Creatinine 1.77 (H) 0.50 - 1.05 mg/dL    eGFR 29 (L) >60 mL/min/1.73m*2    Calcium 8.9 8.6 - 10.3 mg/dL    Albumin 3.1 (L) 3.4 - 5.0 g/dL    Alkaline Phosphatase 197 (H) 33 - 136 U/L    Total Protein 5.5 (L) 6.4 - 8.2 g/dL    AST 21 9 - 39 U/L    Bilirubin, Total 0.6 0.0 - 1.2 mg/dL    ALT 52 (H) 7 - 45 U/L   Magnesium   Result Value Ref Range    Magnesium 1.66 1.60 - 2.40 mg/dL   Morphology   Result Value Ref Range    RBC Morphology See Below     Hypochromia Mild     Ovalocytes Few     Teardrop Cells Few    POCT GLUCOSE   Result Value Ref Range    POCT Glucose 162 (H) 74 - 99 mg/dL   POCT GLUCOSE   Result Value Ref Range    POCT Glucose 210 (H) 74 - 99 mg/dL   POCT GLUCOSE   Result Value Ref Range    POCT Glucose 191 (H) 74 - 99 mg/dL   POCT GLUCOSE   Result Value Ref Range    POCT Glucose 133 (H) 74 - 99 mg/dL   Blood Gas Venous Full Panel   Result Value Ref Range    POCT pH, Venous 7.45 (H) 7.33 - 7.43 pH    POCT pCO2, Venous 40 (L) 41 - 51 mm Hg    POCT pO2, Venous 51 (H) 35 - 45 mm Hg     POCT SO2, Venous 87 (H) 45 - 75 %    POCT Oxy Hemoglobin, Venous 84.8 (H) 45.0 - 75.0 %    POCT Hematocrit Calculated, Venous 24.0 (L) 36.0 - 46.0 %    POCT Sodium, Venous 133 (L) 136 - 145 mmol/L    POCT Potassium, Venous 4.0 3.5 - 5.3 mmol/L    POCT Chloride, Venous 103 98 - 107 mmol/L    POCT Ionized Calicum, Venous 1.28 1.10 - 1.33 mmol/L    POCT Glucose, Venous 121 (H) 74 - 99 mg/dL    POCT Lactate, Venous 1.3 0.4 - 2.0 mmol/L    POCT Base Excess, Venous 3.5 (H) -2.0 - 3.0 mmol/L    POCT HCO3 Calculated, Venous 27.8 (H) 22.0 - 26.0 mmol/L    POCT Hemoglobin, Venous 7.9 (L) 12.0 - 16.0 g/dL    POCT Anion Gap, Venous 6.0 (L) 10.0 - 25.0 mmol/L    Patient Temperature 37.0 degrees Celsius    FiO2 0 %   POCT GLUCOSE   Result Value Ref Range    POCT Glucose 161 (H) 74 - 99 mg/dL   Urinalysis with Reflex Culture and Microscopic   Result Value Ref Range    Color, Urine Light-Yellow Light-Yellow, Yellow, Dark-Yellow    Appearance, Urine Clear Clear    Specific Gravity, Urine 1.006 1.005 - 1.035    pH, Urine 7.0 5.0, 5.5, 6.0, 6.5, 7.0, 7.5, 8.0    Protein, Urine 70 (1+) (A) NEGATIVE, 10 (TRACE), 20 (TRACE) mg/dL    Glucose, Urine Normal Normal mg/dL    Blood, Urine NEGATIVE NEGATIVE    Ketones, Urine NEGATIVE NEGATIVE mg/dL    Bilirubin, Urine NEGATIVE NEGATIVE    Urobilinogen, Urine Normal Normal mg/dL    Nitrite, Urine NEGATIVE NEGATIVE    Leukocyte Esterase, Urine NEGATIVE NEGATIVE   Extra Urine Gray Tube   Result Value Ref Range    Extra Tube Hold for add-ons.    Urinalysis Microscopic   Result Value Ref Range    WBC, Urine NONE 1-5, NONE /HPF    RBC, Urine NONE NONE, 1-2, 3-5 /HPF    Squamous Epithelial Cells, Urine 1-9 (SPARSE) Reference range not established. /HPF     *Note: Due to a large number of results and/or encounters for the requested time period, some results have not been displayed. A complete set of results can be found in Results Review.                 @LABRCNT(wbc:3d,hb:3d,hct:3d,plt:3d,inr:3d,aptt:3d,na:3d,k:3d,chlor:3d,co2:3d,BUN:3d,creat:3d,hepatic:3d)@    Microbiology data: reviewed    Imaging data: reviewed      Daquan Sandhu  Pager:[phone]  Date of service: 5/24/2024  Time of service: 8:10 AM

## 2024-05-24 NOTE — POST-PROCEDURE NOTE
Report to Receiving RN:    Report To: kaiden love   Time Report Called: 1110  Hand-Off Communication: stable, 1 liter off did well no issues  Complications During Treatment: No  Ultrafiltration Treatment: No  Medications Administered During Dialysis: No  Blood Products Administered During Dialysis: No  Labs Sent During Dialysis: No  Heparin Drip Rate Changes: N/A  Dialysis Catheter Dressing: clean dry intact   Last Dressing Change: 5/20/24    Electronic Signatures:  Ashutosh Jacome RN  (Signed )   Authored:    (Signed )   Authored:     Last Updated: 11:13 AM by ASHUTOSH JACOME

## 2024-05-24 NOTE — PRE-PROCEDURE NOTE
Report from Sending RN:    Report From: Reema love   Recent Surgery of Procedure: No  Baseline Level of Consciousness (LOC): x4  Oxygen Use: No  Type: na  Diabetic: Yes  Last BP Med Given Day of Dialysis: see emar   Last Pain Med Given: see emar   Lab Tests to be Obtained with Dialysis: No  Blood Transfusion to be Given During Dialysis: No  Available IV Access: Yes  Medications to be Administered During Dialysis: No  Continuous IV Infusion Running: No  Restraints on Currently or in the Last 24 Hours: N/A  Hand-Off Communication: stable for hd no complaints   Dialysis Catheter Dressing: clean dry intact   Last Dressing Change: 5/20/24

## 2024-05-24 NOTE — PROGRESS NOTES
Tana Hargrove is a 79 y.o. female on day 4 of admission presenting with Fall, initial encounter.      Subjective   Tana Hargrove is a 79 y.o. female with PMHx s/f CAD, HTN, COPD, ESRD on HD via permacath, DM2, MDS, gout, neuropathy presenting for a fall. Pt lives at home and admits she rolled out of bed onto the floor this morning. Denies head injury or LOC. She has been admitted 6 times this year already for multiple issues. Most recently last admission she had a cholecystostomy tube placed for chronic cholecystitis. She also had an ex lap to remove adhesions between her liver and anterior abdominal wall that were encasing her gallbladder. She denies any other real symptoms at this time. No worsening abdominal pain, fever, chills, issues with the cholecystostomy tube, nausea, vomiting, chest pain, or other symptoms. She has been compliant with taking her medications and with her hemodialysis schedule.     ED Course (Summary):   Vitals on presentation: 100.4 F, 98 bpm, 20 rr, 145/53 --> 180/71, 93% on RA  Labs: CMP glu 165, Cr 2.38, alk phos 292, , , total bilirubin 4.7, lipase 146, Trop 62 --> 63  CBC WBC 9.6, Hg 7.5, Plt 210  Imaging: CXR - no acute process. Agree with interpretation.  XR pelvis - no acute osseous abnormality. Agree with interpretation.  CT a/p - Percutaneous drainage tube noted within the maria isabel hepatis. This  appears to be related to a decompressed gallbladder versus positioning  status post cholecystectomy with residual fluid within the gallbladder  fossa. Clinical correlation for operative history recommended. Agree with interpretation.  C. US gallbladder - Sludge and stones within the gallbladder with edematous wall thickening. No biliary dilatation is appreciated.   Interventions: Admitted to observation for multiple lab abnormalities, mainly LFTs and bilirubin      05/20: Patient was evaluated this morning, awake and alert, denies having chest pain or shortness of breath, no  nausea or vomiting.  Liver function is improving  05/21: Patient was evaluated this a.m., denies having fever or chills, no nausea vomiting, no chest pain or shortness of breath.  Evaluated by cardiology for elevated troponin and EKG changes-recommended medical management and signed off.  Liver function gradually downtrending.  05/22: Patient denies having fever or chills, no nausea or vomiting.  Blood culture growing GNB in 1 bottle.     5/23: She's complaining of mild diffuse pruritus.     Later on 5/23: Patient's nurse noted a changed in her mental status and asked me to come evaluate. This AM when I evaluated patient she was awake and alert and cogent. On my arrival she was tired appearing. She was able to stay awake and was engaging in cogent conversation but appeared to be ready to fall asleep at any time. She was hypertensive but has been so throughout the day. BG was normal. She stated she did not want to go for a CTH. We will check a VBG to assess for hypercarbia and a UA to assess for UTI     5/24: Patient is back to her normal mentation. Per her son she did have what sounds like hospital delirium overnight. Initial plan was home with [facility] but she and her family would now like SNF.              Objective     Last Recorded Vitals  /72   Pulse 79   Temp 36.9 °C (98.4 °F) (Temporal)   Resp 16   Wt 74.8 kg (164 lb 14.5 oz)   SpO2 95%   Intake/Output last 3 Shifts:    Intake/Output Summary (Last 24 hours) at 5/24/2024 1403  Last data filed at 5/24/2024 1300  Gross per 24 hour   Intake 980 ml   Output 2400 ml   Net -1420 ml       Admission Weight  Weight: 69.9 kg (154 lb) (05/19/24 1117)    Daily Weight  05/24/24 : 74.8 kg (164 lb 14.5 oz)    Image Results  ECG 12 lead  Sinus rhythm  Minimal ST depression, lateral leads  Prolonged QT interval      Physical Exam  Patient is awake and orient, not in apparent distress  Eyes: PERRLA, no conjunctival congestion  Chest: Bilateral Air entry, no crackles or  wheezing  Heart: s1S2 regular, no murmur  Abdomen: Soft, non tender, BS present, cholecystostomy tube in place  Ext:    Relevant Results               Assessment/Plan   This patient currently has cardiac telemetry ordered; if you would like to modify or discontinue the telemetry order, click here to go to the orders activity to modify/discontinue the order.    This patient has a central line   Reason for the central line remaining today? Dialysis/Hemapheresis    Sepsis with organ dysfunction of elevated bilirubin  -5/20 Bcx with Klebsiella pneumoniae  -5/222 Bcx NGTD D1  -Continue ceftriaxone 2g every day   -ID following    Elevated liver enzymes  US abdomen shows sludge and stones within the gallbladder, no biliary dilatation.  Hepatitis panel: Negative  Liver function gradually downtrending     ESRD on hemodialysis  Renal consulted for HD management     Elevated troponins:  Patient denies having chest pain, EKG shows minimal ST depression in lateral leads  Cardiology consulted-recommended medical management and signed off.     DM2:  SSI     Normocytic anemia:  Status post 1 unit of PRBC transfusion on 05/20  Monitor H&H and transfuse as needed     HTN:  Home amlodipine, Lopressor     HLD:  Home statin     Renal diet  DVT ppx: Heparin subcutaneous  DNR arrest, no intubation     Physical debility/deconditioning  PT/OT, planning on SNF DC     Hypokalemia  Replace and monitor    Perioperative cardiovascular risk stratification  Patient was evaluated by cardiology and feels that patient has elevated risk for perioperative MACE based on functional capacity.         Pollo Herbert MD PhD

## 2024-05-24 NOTE — PROGRESS NOTES
"      Nephrology Progress Note      Nephrology following for ESRD.   Events over night:   Has not had fevers or chills.  Dialyzed earlier with 1 L UF      /72   Pulse 79   Temp 36.9 °C (98.4 °F) (Temporal)   Resp 16   Ht 1.549 m (5' 1\")   Wt 74.8 kg (164 lb 14.5 oz)   SpO2 95%   BMI 31.16 kg/m²     Input / Output:  24 HR:   Intake/Output Summary (Last 24 hours) at 5/24/2024 1236  Last data filed at 5/24/2024 1105  Gross per 24 hour   Intake 980 ml   Output 2200 ml   Net -1220 ml       Physical Exam   Alert and oriented x 3 NAD  Neck: no JVD  CV: RRR  Lungs: CTA bilaterally  Abd: soft, NT, ND   Ext: No lower extremity edema    Scheduled medications  amLODIPine, 10 mg, oral, Daily  ascorbic acid, 250 mg, oral, Daily  carvedilol, 12.5 mg, oral, BID  cefTRIAXone, 2 g, intravenous, q24h  heparin (porcine), 5,000 Units, subcutaneous, q8h JOSE DE JESUS  heparin, 2,000 Units, intra-catheter, After Dialysis  heparin, 2,000 Units, intra-catheter, After Dialysis  insulin lispro, 0-5 Units, subcutaneous, TID  metroNIDAZOLE, 500 mg, intravenous, q8h  multivitamin, 1 tablet, oral, Daily  pantoprazole, 40 mg, oral, Daily before breakfast   Or  pantoprazole, 40 mg, intravenous, Daily before breakfast  pravastatin, 40 mg, oral, Nightly  pregabalin, 100 mg, oral, BID  sodium chloride, 10 mL, intra-catheter, BID  zinc sulfate, 50 mg of elemental zinc, oral, Daily      Continuous medications     PRN medications  PRN medications: acetaminophen, bisacodyl, butalbital-acetaminophen-caff, dextrose, dextrose, glucagon, glucagon, guaiFENesin, hydrALAZINE, hydrOXYzine HCL, melatonin, ondansetron **OR** ondansetron   Results from last 7 days   Lab Units 05/23/24  0430   SODIUM mmol/L 138   POTASSIUM mmol/L 3.6   CHLORIDE mmol/L 103   CO2 mmol/L 29   BUN mg/dL 13   CREATININE mg/dL 1.77*   CALCIUM mg/dL 8.9   PROTEIN TOTAL g/dL 5.5*   BILIRUBIN TOTAL mg/dL 0.6   ALK PHOS U/L 197*   ALT U/L 52*   AST U/L 21   GLUCOSE mg/dL 109*      Results " from last 7 days   Lab Units 05/23/24  0430 05/22/24  0502 05/21/24  0939   MAGNESIUM mg/dL 1.66 1.72 1.64      Results from last 7 days   Lab Units 05/23/24  0430 05/22/24  0502 05/21/24  0939   WBC AUTO x10*3/uL 3.9* 4.4 3.6*   HEMOGLOBIN g/dL 7.7* 7.4* 8.1*   HEMATOCRIT % 24.2* 22.1* 23.9*   PLATELETS AUTO x10*3/uL 196 195 189        Assessment & Plan:     Patient is 79 y.o. female history of DM 2, ESRD, hypertension who is admitted to hospital for fall. Nephrology consulted in view of ESRD.     ESRD  -Hemodialysis Monday Wednesday Friday at [facility]  -Has been compliant with dialysis     Hypertension  -Controlled    Hypokalemia     Anemia of ESRD and myelodysplastic syndrome  -She is on MARILYNN with dialysis  -Hemoglobin is 6.4  -Requirestransfusions despite MARILYNN  -Hemoglobin has been between 8 and 9 as an outpatient in the month of May     CKD-MBD  -Patient currently not on a phosphorus binder     Gram negative bacilli BSI  Has percutaneous cutaneous cholecystotomy drain  -Surgery following  -ID to see   -  after dialysis  -Patient also on metronidazole    Elevated liver enzymes    HFpEF  -Compensated, does not have high intradialytic weight gains and minimal fluid removal required with dialysis     Recommendations:   -Hemodialysis today, 3K, run even continue hemodialysis   -Continue HD Monday Wednesday Friday  -may Need cholecystectomy sooner      Please message me through EPIC chat with any questions or concerns.     Pinky Christie DO  5/24/2024  12:36 PM     [facility]    [address], [address]   [place] [address]  Office: [phone]

## 2024-05-24 NOTE — PROGRESS NOTES
Occupational Therapy                 Therapy Communication Note    Patient Name: Tana Hargrove  MRN: [id number]  Today's Date: 5/24/2024     Discipline: Occupational Therapy    Missed Visit Reason: Missed Visit Reason: Patient refused    Missed Time: Attempted OT eval. Pt declined d/t not feeling well. RN reports pt recently sat EOB with them. Pt became very dizzy, diaphoretic and vomited. The patient does not feel up to further activity at this time.      Comment:

## 2024-05-24 NOTE — PROGRESS NOTES
Physical Therapy                 Therapy Communication Note    Patient Name: Tana Hargrove  MRN: [id number]  Today's Date: 5/24/2024     Discipline: Physical Therapy    Missed Visit Reason: Missed Visit Reason: Patient refused    Missed Time: Attempt    Comment: PT DECLINES STATES SHE IS NOT FEELING WELL,PT. IN BED LOOKS PALE AND FATIGUED, PER RN PT. DID TRY SITTING UP  A FEW MIN BEFORE THERAPY CAME TO SEE HER AND PT. GOT DIAPHORETIC,  BP DROPPED AND SHE VOMITED, WILL SEE FOR EVAL 5/25

## 2024-05-25 LAB
ATRIAL RATE: 96 BPM
BACTERIA BLD AEROBE CULT: ABNORMAL
BACTERIA BLD CULT: ABNORMAL
BACTERIA BLD CULT: NORMAL
BACTERIA BLD CULT: NORMAL
GLUCOSE BLD MANUAL STRIP-MCNC: 137 MG/DL (ref 74–99)
GLUCOSE BLD MANUAL STRIP-MCNC: 148 MG/DL (ref 74–99)
GLUCOSE BLD MANUAL STRIP-MCNC: 185 MG/DL (ref 74–99)
GLUCOSE BLD MANUAL STRIP-MCNC: 234 MG/DL (ref 74–99)
GRAM STN SPEC: ABNORMAL
P AXIS: 48 DEGREES
PR INTERVAL: 164 MS
Q ONSET: 249 MS
QRS COUNT: 16 BEATS
QRS DURATION: 88 MS
QT INTERVAL: 397 MS
QTC CALCULATION(BAZETT): 507 MS
QTC FREDERICIA: 467 MS
R AXIS: 11 DEGREES
T AXIS: 58 DEGREES
T OFFSET: 448 MS
VENTRICULAR RATE: 98 BPM

## 2024-05-25 PROCEDURE — 2500000004 HC RX 250 GENERAL PHARMACY W/ HCPCS (ALT 636 FOR OP/ED): Performed by: SURGERY

## 2024-05-25 PROCEDURE — 2500000001 HC RX 250 WO HCPCS SELF ADMINISTERED DRUGS (ALT 637 FOR MEDICARE OP): Performed by: INTERNAL MEDICINE

## 2024-05-25 PROCEDURE — 97165 OT EVAL LOW COMPLEX 30 MIN: CPT | Mod: GO | Performed by: OCCUPATIONAL THERAPIST

## 2024-05-25 PROCEDURE — 97535 SELF CARE MNGMENT TRAINING: CPT | Mod: GO | Performed by: OCCUPATIONAL THERAPIST

## 2024-05-25 PROCEDURE — 2500000004 HC RX 250 GENERAL PHARMACY W/ HCPCS (ALT 636 FOR OP/ED): Performed by: NURSE PRACTITIONER

## 2024-05-25 PROCEDURE — 82947 ASSAY GLUCOSE BLOOD QUANT: CPT

## 2024-05-25 PROCEDURE — 2500000004 HC RX 250 GENERAL PHARMACY W/ HCPCS (ALT 636 FOR OP/ED): Performed by: INTERNAL MEDICINE

## 2024-05-25 PROCEDURE — 97162 PT EVAL MOD COMPLEX 30 MIN: CPT | Mod: GP

## 2024-05-25 PROCEDURE — 2060000001 HC INTERMEDIATE ICU ROOM DAILY

## 2024-05-25 PROCEDURE — A4217 STERILE WATER/SALINE, 500 ML: HCPCS | Performed by: SURGERY

## 2024-05-25 PROCEDURE — 2500000001 HC RX 250 WO HCPCS SELF ADMINISTERED DRUGS (ALT 637 FOR MEDICARE OP): Performed by: STUDENT IN AN ORGANIZED HEALTH CARE EDUCATION/TRAINING PROGRAM

## 2024-05-25 PROCEDURE — 2500000004 HC RX 250 GENERAL PHARMACY W/ HCPCS (ALT 636 FOR OP/ED): Mod: JZ | Performed by: INTERNAL MEDICINE

## 2024-05-25 PROCEDURE — 2500000001 HC RX 250 WO HCPCS SELF ADMINISTERED DRUGS (ALT 637 FOR MEDICARE OP): Performed by: SURGERY

## 2024-05-25 PROCEDURE — 99233 SBSQ HOSP IP/OBS HIGH 50: CPT | Performed by: INTERNAL MEDICINE

## 2024-05-25 RX ADMIN — PANTOPRAZOLE SODIUM 40 MG: 40 TABLET, DELAYED RELEASE ORAL at 07:00

## 2024-05-25 RX ADMIN — PRAVASTATIN SODIUM 40 MG: 40 TABLET ORAL at 20:12

## 2024-05-25 RX ADMIN — Medication 50 MG OF ELEMENTAL ZINC: at 08:37

## 2024-05-25 RX ADMIN — PREGABALIN 100 MG: 25 CAPSULE ORAL at 20:15

## 2024-05-25 RX ADMIN — INSULIN LISPRO 1 UNITS: 100 INJECTION, SOLUTION INTRAVENOUS; SUBCUTANEOUS at 17:07

## 2024-05-25 RX ADMIN — OXYCODONE HYDROCHLORIDE AND ACETAMINOPHEN 250 MG: 500 TABLET ORAL at 08:37

## 2024-05-25 RX ADMIN — METRONIDAZOLE 500 MG: 500 INJECTION, SOLUTION INTRAVENOUS at 06:28

## 2024-05-25 RX ADMIN — METRONIDAZOLE 500 MG: 500 INJECTION, SOLUTION INTRAVENOUS at 13:06

## 2024-05-25 RX ADMIN — SODIUM CHLORIDE 10 ML: 900 IRRIGANT IRRIGATION at 21:00

## 2024-05-25 RX ADMIN — CEFTRIAXONE SODIUM 2 G: 2 INJECTION, SOLUTION INTRAVENOUS at 17:07

## 2024-05-25 RX ADMIN — HEPARIN SODIUM 5000 UNITS: 5000 INJECTION INTRAVENOUS; SUBCUTANEOUS at 06:00

## 2024-05-25 RX ADMIN — METRONIDAZOLE 500 MG: 500 INJECTION, SOLUTION INTRAVENOUS at 21:38

## 2024-05-25 RX ADMIN — AMLODIPINE BESYLATE 10 MG: 10 TABLET ORAL at 08:37

## 2024-05-25 RX ADMIN — MULTIVITAMIN TABLET 1 TABLET: TABLET at 08:37

## 2024-05-25 RX ADMIN — SODIUM CHLORIDE 10 ML: 900 IRRIGANT IRRIGATION at 09:00

## 2024-05-25 RX ADMIN — CARVEDILOL 12.5 MG: 12.5 TABLET, FILM COATED ORAL at 20:12

## 2024-05-25 RX ADMIN — HEPARIN SODIUM 5000 UNITS: 5000 INJECTION INTRAVENOUS; SUBCUTANEOUS at 13:06

## 2024-05-25 RX ADMIN — PREGABALIN 100 MG: 25 CAPSULE ORAL at 08:37

## 2024-05-25 RX ADMIN — CARVEDILOL 12.5 MG: 12.5 TABLET, FILM COATED ORAL at 08:37

## 2024-05-25 RX ADMIN — INSULIN LISPRO 2 UNITS: 100 INJECTION, SOLUTION INTRAVENOUS; SUBCUTANEOUS at 13:06

## 2024-05-25 RX ADMIN — HEPARIN SODIUM 5000 UNITS: 5000 INJECTION INTRAVENOUS; SUBCUTANEOUS at 21:38

## 2024-05-25 ASSESSMENT — PAIN SCALES - GENERAL
PAINLEVEL_OUTOF10: 0 - NO PAIN

## 2024-05-25 ASSESSMENT — COGNITIVE AND FUNCTIONAL STATUS - GENERAL
MOBILITY SCORE: 21
PERSONAL GROOMING: A LITTLE
TOILETING: A LOT
HELP NEEDED FOR BATHING: A LOT
CLIMB 3 TO 5 STEPS WITH RAILING: A LOT
DAILY ACTIVITIY SCORE: 16
DRESSING REGULAR LOWER BODY CLOTHING: A LOT
WALKING IN HOSPITAL ROOM: A LITTLE
DRESSING REGULAR UPPER BODY CLOTHING: A LITTLE

## 2024-05-25 ASSESSMENT — PAIN - FUNCTIONAL ASSESSMENT
PAIN_FUNCTIONAL_ASSESSMENT: 0-10

## 2024-05-25 ASSESSMENT — ACTIVITIES OF DAILY LIVING (ADL): HOME_MANAGEMENT_TIME_ENTRY: 18

## 2024-05-25 NOTE — PROGRESS NOTES
"      Nephrology Progress Note      Nephrology following for ESRD.   Events over night:   Patient seen this a.m. without any new acute complaints.  Stated that she tolerated dialysis yesterday.      /70 (BP Location: Right arm, Patient Position: Lying)   Pulse 70   Temp 36.7 °C (98 °F) (Temporal)   Resp 16   Ht 1.549 m (5' 1\")   Wt 73.8 kg (162 lb 11.2 oz)   SpO2 94%   BMI 30.74 kg/m²     Input / Output:  24 HR:   Intake/Output Summary (Last 24 hours) at 5/25/2024 1127  Last data filed at 5/25/2024 0422  Gross per 24 hour   Intake 480 ml   Output 515 ml   Net -35 ml       Physical Exam   Alert and oriented x 3 NAD  Neck: no JVD  CV: RRR  Lungs: CTA bilaterally  Abd: soft, NT, ND   Ext: No lower extremity edema    Scheduled medications  amLODIPine, 10 mg, oral, Daily  ascorbic acid, 250 mg, oral, Daily  carvedilol, 12.5 mg, oral, BID  cefTRIAXone, 2 g, intravenous, q24h  heparin (porcine), 5,000 Units, subcutaneous, q8h JOSE DE JESUS  heparin, 2,000 Units, intra-catheter, After Dialysis  heparin, 2,000 Units, intra-catheter, After Dialysis  insulin lispro, 0-5 Units, subcutaneous, TID  metroNIDAZOLE, 500 mg, intravenous, q8h  multivitamin, 1 tablet, oral, Daily  pantoprazole, 40 mg, oral, Daily before breakfast   Or  pantoprazole, 40 mg, intravenous, Daily before breakfast  pravastatin, 40 mg, oral, Nightly  pregabalin, 100 mg, oral, BID  sodium chloride, 10 mL, intra-catheter, BID  zinc sulfate, 50 mg of elemental zinc, oral, Daily      Continuous medications     PRN medications  PRN medications: acetaminophen, bisacodyl, butalbital-acetaminophen-caff, dextrose, dextrose, glucagon, glucagon, guaiFENesin, hydrALAZINE, melatonin, ondansetron **OR** ondansetron   Results from last 7 days   Lab Units 05/23/24  0430   SODIUM mmol/L 138   POTASSIUM mmol/L 3.6   CHLORIDE mmol/L 103   CO2 mmol/L 29   BUN mg/dL 13   CREATININE mg/dL 1.77*   CALCIUM mg/dL 8.9   PROTEIN TOTAL g/dL 5.5*   BILIRUBIN TOTAL mg/dL 0.6   ALK PHOS " U/L 197*   ALT U/L 52*   AST U/L 21   GLUCOSE mg/dL 109*      Results from last 7 days   Lab Units 05/23/24  0430 05/22/24  0502 05/21/24  0939   MAGNESIUM mg/dL 1.66 1.72 1.64      Results from last 7 days   Lab Units 05/23/24  0430 05/22/24  0502 05/21/24  0939   WBC AUTO x10*3/uL 3.9* 4.4 3.6*   HEMOGLOBIN g/dL 7.7* 7.4* 8.1*   HEMATOCRIT % 24.2* 22.1* 23.9*   PLATELETS AUTO x10*3/uL 196 195 189        Assessment & Plan:     Patient is 79 y.o. female history of DM 2, ESRD, hypertension who is admitted to hospital for fall. Nephrology consulted in view of ESRD.     ESRD  -Hemodialysis Monday Wednesday Friday at [facility]  -Has been compliant with dialysis     Hypertension  -Controlled    Hypokalemia     Anemia of ESRD and myelodysplastic syndrome  -She is on MARILYNN with dialysis  -Hemoglobin is 6.4  -Requirestransfusions despite MARILYNN  -Hemoglobin has been between 8 and 9 as an outpatient in the month of May     CKD-MBD  -Patient currently not on a phosphorus binder     Gram negative bacilli BSI  Has percutaneous cutaneous cholecystotomy drain  -Surgery following  -ID to see   -  after dialysis  -Patient also on metronidazole    Elevated liver enzymes    HFpEF  -Compensated, does not have high intradialytic weight gains and minimal fluid removal required with dialysis     Recommendations:   -No acute indication for dialysis today.  Will continue regular schedule for now  -Continue HD Monday Wednesday Friday  -may Need cholecystectomy sooner      Please message me through [employer] chat with any questions or concerns.     Amando Troy MD  5/25/2024  11:27 AM     [facility]    [address], [address]   [place] [id number]  Office: [phone]

## 2024-05-25 NOTE — PROGRESS NOTES
Occupational Therapy    Evaluation    Patient Name: Tana Hargrove  MRN: [id number]  Today's Date: 5/25/2024  Time Calculation  Start Time: 0853  Stop Time: 0926  Time Calculation (min): 33 min        Assessment:  OT Assessment: Pt is a 80 y/o F who presents to this facility s/p fall. Pt with performance deficits in strength, functional activity tolerance, and balance limiting ability to complete ADL and IADL tasks. Pt would benefit from skilled OT services to address performance deficits.  Prognosis: Good  Evaluation/Treatment Tolerance: Patient tolerated treatment well  Medical Staff Made Aware: Yes  End of Session Communication: Bedside nurse  End of Session Patient Position: Up in chair, Alarm on  Prognosis: Good  Evaluation/Treatment Tolerance: Patient tolerated treatment well  Medical Staff Made Aware: Yes  Plan:  Treatment Interventions: ADL retraining, Functional transfer training, UE strengthening/ROM, Endurance training, Patient/family training, Equipment evaluation/education, Compensatory technique education  OT Frequency: 4 times per week  OT Discharge Recommendations: Moderate intensity level of continued care  Treatment Interventions: ADL retraining, Functional transfer training, UE strengthening/ROM, Endurance training, Patient/family training, Equipment evaluation/education, Compensatory technique education    Subjective   Current Problem:  1. Fall, initial encounter        2. Elevated LFTs        3. Chronic cholecystitis  Case Request Operating Room: Laparoscopic, possible open, cholecystectomy    Case Request Operating Room: Laparoscopic, possible open, cholecystectomy        General:  General  Reason for Referral: Pt presents s/p fall.  Past Medical History Relevant to Rehab: PMHx s/f CAD, HTN, COPD, ESRD on HD via permacath, DM2, MDS, gout, neuropathy  Co-Treatment: PT  Co-Treatment Reason: Unknown level of assistance with ADL and functional transfers. Maximize safety with functional tasks.  Prior  to Session Communication: Bedside nurse  Patient Position Received: Up in chair  General Comment: Pt seated in chair upon arrival, chair alarm off. Pt pleasant, agreeable to OT treatment.  Precautions:  Precautions Comment: Purewick in place     Pain:  Pain Assessment  Pain Assessment: 0-10  Pain Score: 0 - No pain    Objective   Cognition:  Overall Cognitive Status: Within Functional Limits           Home Living:  Type of Home: Condo  Lives With: Alone  Home Living Comments: Pt reporting she lives in single level condo with no steps to enter. Pt has rollator, grab bars, shower chair, and reacher. Pt stating she drives in community. Does grocery .  Prior Function:  Level of Edgar: Independent with ADLs and functional transfers  Prior Function Comments: Pt reporting IND with ADL tasks. MOD IND using Rollator for functional amb and transfers.     ADL:  Grooming Assistance: Stand by  LE Dressing Assistance: Moderate  Toileting Assistance with Device: Moderate  Functional Assistance: Stand by  ADL Comments: Pt agreeable to participate in ADL routine. Functional amb to bathroom with SBA. Toileting transfers with SBA and MIN VC for RW safety. Increased assistance required with toileting routine. MOD A for dick-care and CM seated on toilet. Rest breaks provided. SBA for grooming tasks of washing hands standing at sinkside. No LOB.  Activity Tolerance:  Endurance: Endurance does not limit participation in activity  Bed Mobility/Transfers:      Transfers  Transfer: Yes  Transfer 1  Transfer Level of Assistance 1: Contact guard  Trials/Comments 1: SIt to stand transfers completed with CGA from recliner chair.  Transfers 2  Transfer Level of Assistance 2: Contact guard  Trials/Comments 2: Toileting transfers completed with CGA using grab bars and RW.      Functional Mobility:  Functional Mobility  Functional Mobility Performed: Yes  Functional Mobility 1  Comments 1: Functional mobility completed household  distances using RW with SBA>  Sitting Balance:  Static Sitting Balance  Static Sitting-Comment/Number of Minutes: SBA  Dynamic Sitting Balance  Dynamic Sitting-Comments: CGA during dressing routine.  Standing Balance:  Static Standing Balance  Static Standing-Comment/Number of Minutes: CGA  Dynamic Standing Balance  Dynamic Standing-Comments: CGA   Strength:  Strength Comments: Enedelia WESTON.      Outcome Measures:[facility] Daily Activity  Putting on and taking off regular lower body clothing: A lot  Bathing (including washing, rinsing, drying): A lot  Putting on and taking off regular upper body clothing: A little  Toileting, which includes using toilet, bedpan or urinal: A lot  Taking care of personal grooming such as brushing teeth: A little  Eating Meals: None  Daily Activity - Total Score: 16        Education Documentation  Precautions, taught by Kristie Lynch OT at 5/25/2024 10:04 AM.  Learner: Patient  Readiness: Acceptance  Method: Explanation  Response: Verbalizes Understanding    Mobility Training, taught by Kristie Lynch OT at 5/25/2024 10:04 AM.  Learner: Patient  Readiness: Acceptance  Method: Explanation  Response: Verbalizes Understanding    Body Mechanics, taught by Kristie Lynch OT at 5/25/2024 10:04 AM.  Learner: Patient  Readiness: Acceptance  Method: Explanation  Response: Verbalizes Understanding    Precautions, taught by Kristie Lynch OT at 5/25/2024 10:04 AM.  Learner: Patient  Readiness: Acceptance  Method: Explanation  Response: Verbalizes Understanding    ADL Training, taught by Kristie Lynch OT at 5/25/2024 10:04 AM.  Learner: Patient  Readiness: Acceptance  Method: Explanation  Response: Verbalizes Understanding    Education Comments  No comments found.        OP EDUCATION:  Education  Individual(s) Educated: Patient  Education Provided: Diagnosis & Precautions, Symptom management, Fall precautons, Risk and benefits of OT discussed with patient or other, POC discussed and  agreed upon  Risk and Benefits Discussed with Patient/Caregiver/Other: yes  Patient/Caregiver Demonstrated Understanding: yes  Plan of Care Discussed and Agreed Upon: yes  Patient Response to Education: Patient/Caregiver Verbalized Understanding of Information    Goals:  Encounter Problems       Encounter Problems (Active)       OT Goals       Pt will complete ADL transfers with MOD IND.  (Progressing)       Start:  05/25/24    Expected End:  06/08/24            Pt will complete dressing routine using AE PRN with MIN A.  (Progressing)       Start:  05/25/24    Expected End:  06/08/24            Pt will complete toileting routine with SBA.  (Progressing)       Start:  05/25/24    Expected End:  06/08/24            Pt will complete bathing routine with CGA.  (Progressing)       Start:  05/25/24    Expected End:  06/08/24

## 2024-05-25 NOTE — CARE PLAN
The patient's goals for the shift include      The clinical goals for the shift include Pt will remian free of falls.    Over the shift, the patient did not make progress toward the following goals. Barriers to progression include generalized weakness. Recommendations to address these barriers include use of bed alarm and frequent rounding.

## 2024-05-25 NOTE — PROGRESS NOTES
5/25/24 1033   05/25/24 1033   Discharge Planning   Patient expects to be discharged to: [facility]   Does the patient need discharge transport arranged? Yes   RoundTrip coordination needed? Yes   Patient Choice   Provider Choice list and CMS website (https://medicare.gov/care-compare#search) for post-acute Quality and Resource Measure Data were provided and reviewed with: Patient;Family   Patient / Family choosing to utilize agency / facility established prior to hospitalization Yes     PT/OT notes updated. Requested  auth team to start auth. ADOD 48-72hrs.

## 2024-05-25 NOTE — CARE PLAN
Problem: PT Problem  Goal: PT Goal 1  Description: Increase DIGNA knee strength to 5/5 to ease capability to perform ADLs  Outcome: Progressing  Goal: PT Goal 2  Description: The patient will be able to perform all transfers with FWW independently   Outcome: Progressing  Goal: PT Goal 3  Description: The patient will be able to ambulate 100 feet with FWW for household ADLs  Outcome: Progressing

## 2024-05-25 NOTE — PROGRESS NOTES
Tana Hargrove is a 79 y.o. female on day 5 of admission presenting with Fall, initial encounter.      Subjective   Tana Hargrove is a 79 y.o. female with PMHx s/f CAD, HTN, COPD, ESRD on HD via permacath, DM2, MDS, gout, neuropathy presenting for a fall. Pt lives at home and admits she rolled out of bed onto the floor this morning. Denies head injury or LOC. She has been admitted 6 times this year already for multiple issues. Most recently last admission she had a cholecystostomy tube placed for chronic cholecystitis. She also had an ex lap to remove adhesions between her liver and anterior abdominal wall that were encasing her gallbladder. She denies any other real symptoms at this time. No worsening abdominal pain, fever, chills, issues with the cholecystostomy tube, nausea, vomiting, chest pain, or other symptoms. She has been compliant with taking her medications and with her hemodialysis schedule.     ED Course (Summary):   Vitals on presentation: 100.4 F, 98 bpm, 20 rr, 145/53 --> 180/71, 93% on RA  Labs: CMP glu 165, Cr 2.38, alk phos 292, , , total bilirubin 4.7, lipase 146, Trop 62 --> 63  CBC WBC 9.6, Hg 7.5, Plt 210  Imaging: CXR - no acute process. Agree with interpretation.  XR pelvis - no acute osseous abnormality. Agree with interpretation.  CT a/p - Percutaneous drainage tube noted within the maria isabel hepatis. This  appears to be related to a decompressed gallbladder versus positioning  status post cholecystectomy with residual fluid within the gallbladder  fossa. Clinical correlation for operative history recommended. Agree with interpretation.  C. US gallbladder - Sludge and stones within the gallbladder with edematous wall thickening. No biliary dilatation is appreciated.   Interventions: Admitted to observation for multiple lab abnormalities, mainly LFTs and bilirubin      05/20: Patient was evaluated this morning, awake and alert, denies having chest pain or shortness of breath, no  nausea or vomiting.  Liver function is improving  05/21: Patient was evaluated this a.m., denies having fever or chills, no nausea vomiting, no chest pain or shortness of breath.  Evaluated by cardiology for elevated troponin and EKG changes-recommended medical management and signed off.  Liver function gradually downtrending.  05/22: Patient denies having fever or chills, no nausea or vomiting.  Blood culture growing GNB in 1 bottle.     5/23: She's complaining of mild diffuse pruritus.     Later on 5/23: Patient's nurse noted a changed in her mental status and asked me to come evaluate. This AM when I evaluated patient she was awake and alert and cogent. On my arrival she was tired appearing. She was able to stay awake and was engaging in cogent conversation but appeared to be ready to fall asleep at any time. She was hypertensive but has been so throughout the day. BG was normal. She stated she did not want to go for a CTH. We will check a VBG to assess for hypercarbia and a UA to assess for UTI     5/24: Patient is back to her normal mentation. Per her son she did have what sounds like hospital delirium overnight. Initial plan was home with [facility] but she and her family would now like SNF.     5/25: She's much improved today. No further episodes of delirium. Awaiting auth.              Objective     Last Recorded Vitals  /66 (BP Location: Left arm, Patient Position: Sitting)   Pulse 64   Temp 37 °C (98.6 °F) (Temporal)   Resp 16   Wt 73.8 kg (162 lb 11.2 oz)   SpO2 96%   Intake/Output last 3 Shifts:    Intake/Output Summary (Last 24 hours) at 5/25/2024 1253  Last data filed at 5/25/2024 0422  Gross per 24 hour   Intake 480 ml   Output 515 ml   Net -35 ml       Admission Weight  Weight: 69.9 kg (154 lb) (05/19/24 1117)    Daily Weight  05/25/24 : 73.8 kg (162 lb 11.2 oz)    Image Results  ECG 12 lead  Sinus rhythm  Minimal ST depression, lateral leads  Prolonged QT interval      Physical Exam  Patient is  awake and orient, not in apparent distress  Eyes: PERRLA, no conjunctival congestion  Chest: Bilateral Air entry, no crackles or wheezing  Heart: s1S2 regular, no murmur  Abdomen: Soft, non tender, BS present, cholecystostomy tube in place  Ext:    Relevant Results               Assessment/Plan   This patient currently has cardiac telemetry ordered; if you would like to modify or discontinue the telemetry order, click here to go to the orders activity to modify/discontinue the order.    This patient has a central line   Reason for the central line remaining today? Dialysis/Hemapheresis    Sepsis with organ dysfunction of elevated bilirubin  -5/20 Bcx with Klebsiella pneumoniae  -5/22 Bcx NGTD D2  -Continue ceftriaxone 2g every day. Abx stop date 6/5   -ID following    Elevated liver enzymes  US abdomen shows sludge and stones within the gallbladder, no biliary dilatation.  Hepatitis panel: Negative  Liver function gradually downtrending     ESRD on hemodialysis  Renal consulted for HD management     Elevated troponins:  Patient denies having chest pain, EKG shows minimal ST depression in lateral leads  Cardiology consulted-recommended medical management and signed off.     DM2:  SSI     Normocytic anemia:  Status post 1 unit of PRBC transfusion on 05/20  Monitor H&H and transfuse as needed     HTN:  Home amlodipine, Lopressor     HLD:  Home statin     Renal diet  DVT ppx: Heparin subcutaneous  DNR arrest, no intubation     Physical debility/deconditioning  PT/OT, planning on SNF DC     Hypokalemia  Replace and monitor    Perioperative cardiovascular risk stratification  Patient was evaluated by cardiology and feels that patient has elevated risk for perioperative MACE based on functional capacity.         Pollo Herbert MD PhD

## 2024-05-25 NOTE — PROGRESS NOTES
Physical Therapy    Physical Therapy Evaluation    Patient Name: Tana Hargrove  MRN: [id number]  Today's Date: 5/25/2024   Time Calculation  Start Time: 0851  Stop Time: 0925  Time Calculation (min): 34 min    Assessment/Plan   PT Assessment  PT Assessment Results: Decreased strength, Decreased range of motion, Decreased endurance, Impaired balance, Decreased mobility  Rehab Prognosis: Good  Medical Staff Made Aware: Yes  End of Session Communication: Bedside nurse  End of Session Patient Position: Up in chair, Alarm on  IP OR SWING BED PT PLAN  Inpatient or Swing Bed: Inpatient  PT Plan  Treatment/Interventions: Transfer training, Gait training, Strengthening, Balance training, Therapeutic exercise, Home exercise program  PT Plan: Skilled PT  PT Frequency: 3 times per week  PT Discharge Recommendations: Moderate intensity level of continued care  PT - OK to Discharge: Yes      Subjective   General Visit Information:  General  Reason for Referral: in preparation got surgery on June 6th  Referred By: Sydney AMBROSE  Past Medical History Relevant to Rehab: Ad Dx: Fall; elevated LFTs - PMHx: CAD, HTN, COPD, ESRD on HD, DM2, MDS, gout, neuropathy  Co-Treatment: OT  Co-Treatment Reason: to ensure safety of patient  Patient Position Received: Up in chair  General Comment: patient in chair; purewick in place; agreeable to PT    Home Living:  Home Living  Type of Home: Condo  Lives With: Alone  Home Adaptive Equipment:  (rollator)  Home Layout: One level  Home Access: Level entry    Prior Level of Function:  Prior Function Per Pt/Caregiver Report  Level of [place]: Independent with ADLs and functional transfers, Independent with homemaking with ambulation    Precautions:  Precautions  Precautions Comment: purewick in place       Objective   Pain:  Pain Assessment  Pain Assessment: 0-10  Pain Score: 0 - No pain    Cognition:  Cognition  Overall Cognitive Status: Within Functional Limits    Functional  Assessments:  Transfers  Transfer: Yes  Transfer 1  Transfer From 1: Sit to  Transfer to 1: Stand  Transfer Device 1: Walker  Transfer Level of Assistance 1: Contact guard    Ambulation/Gait Training  Ambulation/Gait Training Performed: Yes  Ambulation/Gait Training 1  Surface 1: Level tile  Device 1: Rolling walker  Assistance 1: Contact guard  Quality of Gait 1: Decreased step length, Forward flexed posture  Comments/Distance (ft) 1: 20    Extremity/Trunk Assessments:  AROM RLE (degrees)  RLE AROM Comment: decrease R knee extension  Strength RLE  R Knee Flexion: 4/5  R Knee Extension: 4/5  AROM LLE (degrees)  LLE AROM Comment: L knee AROM WFL  Strength LLE  L Knee Flexion: 4/5  L Knee Extension: 4/5    Outcome Measures:  [facility] Basic Mobility  Turning from your back to your side while in a flat bed without using bedrails: None  Moving from lying on your back to sitting on the side of a flat bed without using bedrails: None  Moving to and from bed to chair (including a wheelchair): None  Standing up from a chair using your arms (e.g. wheelchair or bedside chair): None  To walk in hospital room: A little  Climbing 3-5 steps with railing: A lot  Basic Mobility - Total Score: 21    Encounter Problems       Encounter Problems (Active)       PT Problem       PT Goal 1 (Progressing)       Start:  05/25/24    Expected End:  06/08/24       Increase DIGNA knee strength to 5/5 to ease capability to perform ADLs         PT Goal 2 (Progressing)       Start:  05/25/24    Expected End:  06/08/24       The patient will be able to perform all transfers with FWW independently          PT Goal 3 (Progressing)       Start:  05/25/24    Expected End:  06/08/24       The patient will be able to ambulate 100 feet with FWW for household ADLs            Pain - Adult              Education Documentation  Precautions, taught by Rod Bernardo at 5/25/2024  9:52 AM.  Learner: Patient  Readiness: Acceptance  Method: Explanation,  Demonstration  Response: Verbalizes Understanding, Demonstrated Understanding    Mobility Training, taught by Rod Bernardo at 5/25/2024  9:52 AM.  Learner: Patient  Readiness: Acceptance  Method: Explanation, Demonstration  Response: Verbalizes Understanding, Demonstrated Understanding    Education Comments  No comments found.

## 2024-05-26 LAB
BACTERIA BLD CULT: NORMAL
BACTERIA BLD CULT: NORMAL
GLUCOSE BLD MANUAL STRIP-MCNC: 128 MG/DL (ref 74–99)
GLUCOSE BLD MANUAL STRIP-MCNC: 178 MG/DL (ref 74–99)
GLUCOSE BLD MANUAL STRIP-MCNC: 180 MG/DL (ref 74–99)
GLUCOSE BLD MANUAL STRIP-MCNC: 291 MG/DL (ref 74–99)

## 2024-05-26 PROCEDURE — 2060000001 HC INTERMEDIATE ICU ROOM DAILY

## 2024-05-26 PROCEDURE — 2500000004 HC RX 250 GENERAL PHARMACY W/ HCPCS (ALT 636 FOR OP/ED): Performed by: NURSE PRACTITIONER

## 2024-05-26 PROCEDURE — 99233 SBSQ HOSP IP/OBS HIGH 50: CPT | Performed by: INTERNAL MEDICINE

## 2024-05-26 PROCEDURE — 2500000004 HC RX 250 GENERAL PHARMACY W/ HCPCS (ALT 636 FOR OP/ED): Mod: JZ | Performed by: INTERNAL MEDICINE

## 2024-05-26 PROCEDURE — 2500000001 HC RX 250 WO HCPCS SELF ADMINISTERED DRUGS (ALT 637 FOR MEDICARE OP): Performed by: INTERNAL MEDICINE

## 2024-05-26 PROCEDURE — 2500000001 HC RX 250 WO HCPCS SELF ADMINISTERED DRUGS (ALT 637 FOR MEDICARE OP): Performed by: STUDENT IN AN ORGANIZED HEALTH CARE EDUCATION/TRAINING PROGRAM

## 2024-05-26 PROCEDURE — 2500000001 HC RX 250 WO HCPCS SELF ADMINISTERED DRUGS (ALT 637 FOR MEDICARE OP): Performed by: SURGERY

## 2024-05-26 PROCEDURE — 2500000004 HC RX 250 GENERAL PHARMACY W/ HCPCS (ALT 636 FOR OP/ED): Performed by: INTERNAL MEDICINE

## 2024-05-26 PROCEDURE — A4217 STERILE WATER/SALINE, 500 ML: HCPCS | Performed by: SURGERY

## 2024-05-26 PROCEDURE — 2500000004 HC RX 250 GENERAL PHARMACY W/ HCPCS (ALT 636 FOR OP/ED): Performed by: SURGERY

## 2024-05-26 PROCEDURE — 82947 ASSAY GLUCOSE BLOOD QUANT: CPT

## 2024-05-26 RX ADMIN — Medication 50 MG OF ELEMENTAL ZINC: at 08:36

## 2024-05-26 RX ADMIN — METRONIDAZOLE 500 MG: 500 INJECTION, SOLUTION INTRAVENOUS at 14:02

## 2024-05-26 RX ADMIN — SODIUM CHLORIDE 10 ML: 900 IRRIGANT IRRIGATION at 09:00

## 2024-05-26 RX ADMIN — PRAVASTATIN SODIUM 40 MG: 40 TABLET ORAL at 20:06

## 2024-05-26 RX ADMIN — METRONIDAZOLE 500 MG: 500 INJECTION, SOLUTION INTRAVENOUS at 21:58

## 2024-05-26 RX ADMIN — PREGABALIN 100 MG: 25 CAPSULE ORAL at 08:36

## 2024-05-26 RX ADMIN — MULTIVITAMIN TABLET 1 TABLET: TABLET at 08:36

## 2024-05-26 RX ADMIN — CARVEDILOL 12.5 MG: 12.5 TABLET, FILM COATED ORAL at 08:36

## 2024-05-26 RX ADMIN — SODIUM CHLORIDE 10 ML: 900 IRRIGANT IRRIGATION at 21:00

## 2024-05-26 RX ADMIN — OXYCODONE HYDROCHLORIDE AND ACETAMINOPHEN 250 MG: 500 TABLET ORAL at 08:36

## 2024-05-26 RX ADMIN — PREGABALIN 100 MG: 25 CAPSULE ORAL at 20:06

## 2024-05-26 RX ADMIN — HEPARIN SODIUM 5000 UNITS: 5000 INJECTION INTRAVENOUS; SUBCUTANEOUS at 21:58

## 2024-05-26 RX ADMIN — AMLODIPINE BESYLATE 10 MG: 10 TABLET ORAL at 08:36

## 2024-05-26 RX ADMIN — CARVEDILOL 12.5 MG: 12.5 TABLET, FILM COATED ORAL at 20:06

## 2024-05-26 RX ADMIN — HEPARIN SODIUM 5000 UNITS: 5000 INJECTION INTRAVENOUS; SUBCUTANEOUS at 14:06

## 2024-05-26 RX ADMIN — METRONIDAZOLE 500 MG: 500 INJECTION, SOLUTION INTRAVENOUS at 05:34

## 2024-05-26 RX ADMIN — INSULIN LISPRO 3 UNITS: 100 INJECTION, SOLUTION INTRAVENOUS; SUBCUTANEOUS at 11:27

## 2024-05-26 RX ADMIN — PANTOPRAZOLE SODIUM 40 MG: 40 TABLET, DELAYED RELEASE ORAL at 05:34

## 2024-05-26 RX ADMIN — HEPARIN SODIUM 5000 UNITS: 5000 INJECTION INTRAVENOUS; SUBCUTANEOUS at 05:34

## 2024-05-26 RX ADMIN — CEFTRIAXONE SODIUM 2 G: 2 INJECTION, SOLUTION INTRAVENOUS at 17:27

## 2024-05-26 ASSESSMENT — PAIN SCALES - GENERAL
PAINLEVEL_OUTOF10: 0 - NO PAIN
PAINLEVEL_OUTOF10: 0 - NO PAIN

## 2024-05-26 ASSESSMENT — COGNITIVE AND FUNCTIONAL STATUS - GENERAL
EATING MEALS: A LITTLE
CLIMB 3 TO 5 STEPS WITH RAILING: A LOT
TOILETING: A LITTLE
PERSONAL GROOMING: A LITTLE
DAILY ACTIVITIY SCORE: 21
MOBILITY SCORE: 19
STANDING UP FROM CHAIR USING ARMS: A LITTLE
MOVING TO AND FROM BED TO CHAIR: A LITTLE
WALKING IN HOSPITAL ROOM: A LITTLE

## 2024-05-26 ASSESSMENT — PAIN - FUNCTIONAL ASSESSMENT: PAIN_FUNCTIONAL_ASSESSMENT: 0-10

## 2024-05-26 NOTE — PROGRESS NOTES
"      Nephrology Progress Note      Nephrology following for ESRD.   Events over night:   Patient seen this a.m. without any new complaints.  Resting comfortably in chair.  Denies any chest pain, shortness of breath, fever chills sweats.      /73 (BP Location: Right arm, Patient Position: Lying)   Pulse 69   Temp 36.8 °C (98.3 °F) (Temporal)   Resp 18   Ht 1.549 m (5' 1\")   Wt 74.6 kg (164 lb 7.4 oz)   SpO2 94%   BMI 31.08 kg/m²     Input / Output:  24 HR:   Intake/Output Summary (Last 24 hours) at 5/26/2024 0945  Last data filed at 5/26/2024 0534  Gross per 24 hour   Intake 700 ml   Output 1190 ml   Net -490 ml       Physical Exam   Alert and oriented x 3 NAD  Neck: no JVD  CV: RRR  Lungs: CTA bilaterally  Abd: soft, NT, ND   Ext: No lower extremity edema    Scheduled medications  amLODIPine, 10 mg, oral, Daily  ascorbic acid, 250 mg, oral, Daily  carvedilol, 12.5 mg, oral, BID  cefTRIAXone, 2 g, intravenous, q24h  heparin (porcine), 5,000 Units, subcutaneous, q8h JOSE DE JESUS  heparin, 2,000 Units, intra-catheter, After Dialysis  heparin, 2,000 Units, intra-catheter, After Dialysis  insulin lispro, 0-5 Units, subcutaneous, TID  metroNIDAZOLE, 500 mg, intravenous, q8h  multivitamin, 1 tablet, oral, Daily  pantoprazole, 40 mg, oral, Daily before breakfast   Or  pantoprazole, 40 mg, intravenous, Daily before breakfast  pravastatin, 40 mg, oral, Nightly  pregabalin, 100 mg, oral, BID  sodium chloride, 10 mL, intra-catheter, BID  zinc sulfate, 50 mg of elemental zinc, oral, Daily      Continuous medications     PRN medications  PRN medications: acetaminophen, bisacodyl, butalbital-acetaminophen-caff, dextrose, dextrose, glucagon, glucagon, guaiFENesin, hydrALAZINE, melatonin, ondansetron **OR** ondansetron   Results from last 7 days   Lab Units 05/23/24  0430   SODIUM mmol/L 138   POTASSIUM mmol/L 3.6   CHLORIDE mmol/L 103   CO2 mmol/L 29   BUN mg/dL 13   CREATININE mg/dL 1.77*   CALCIUM mg/dL 8.9   PROTEIN TOTAL " g/dL 5.5*   BILIRUBIN TOTAL mg/dL 0.6   ALK PHOS U/L 197*   ALT U/L 52*   AST U/L 21   GLUCOSE mg/dL 109*      Results from last 7 days   Lab Units 05/23/24  0430 05/22/24  0502 05/21/24  0939   MAGNESIUM mg/dL 1.66 1.72 1.64      Results from last 7 days   Lab Units 05/23/24  0430 05/22/24  0502 05/21/24  0939   WBC AUTO x10*3/uL 3.9* 4.4 3.6*   HEMOGLOBIN g/dL 7.7* 7.4* 8.1*   HEMATOCRIT % 24.2* 22.1* 23.9*   PLATELETS AUTO x10*3/uL 196 195 189        Assessment & Plan:     Patient is 79 y.o. female history of DM 2, ESRD, hypertension who is admitted to hospital for fall. Nephrology consulted in view of ESRD.     ESRD  -Hemodialysis Monday Wednesday Friday at [facility]  -Has been compliant with dialysis     Hypertension  -Controlled    Hypokalemia     Anemia of ESRD and myelodysplastic syndrome  -She is on MARILYNN with dialysis  -Hemoglobin is 6.4  -Requirestransfusions despite MARILYNN  -Hemoglobin has been between 8 and 9 as an outpatient in the month of May     CKD-MBD  -Patient currently not on a phosphorus binder     Gram negative bacilli BSI  Has percutaneous cutaneous cholecystotomy drain  -Surgery following  -ID to see   -  after dialysis  -Patient also on metronidazole    Elevated liver enzymes    HFpEF  -Compensated, does not have high intradialytic weight gains and minimal fluid removal required with dialysis     Recommendations:   -No urgent need for dialysis today.  -Continue continue HD Monday Wednesday Friday  -may Need cholecystectomy sooner  -Daily Nephrocaps  -Phos goal 3.5-5.5  -Transfuse for hemoglobin less than 7      Please message me through [employer] chat with any questions or concerns.     Amando Troy MD  5/26/2024  9:45 AM     [facility]    [address], [address]   [place] [address]  Office: [phone]

## 2024-05-26 NOTE — PROGRESS NOTES
[facility] INFECTIOUS DISEASE PROGRESS NOTE    Patient Name: Tana Hargrove  MRN: [id number]    INTERVAL HISTORY:   Overnight events noted.  The patient was lethargic last night.  No fevers or chills with a maximum temperature of 99.2 °F.    Patient Active Problem List   Diagnosis    Lumbago    A-fib (Multi)    Anxiety    Chronic diastolic heart failure of unknown etiology (Multi)    Cervical spondylosis without myelopathy    Coronary artery disease    Degeneration of intervertebral disc of lumbar region    Hypertension, essential    Frequent falls    GERD (gastroesophageal reflux disease)    Hyperlipidemia    Inability to ambulate due to multiple joints    Jerking movements of extremities    Spinal stenosis of lumbar region    Lumbar spondylosis    Macrocytic anemia    Myelodysplastic syndrome (Multi)    Myofascial pain syndrome    Occasional tremors    Osteoporosis    Stage 4 chronic kidney disease (Multi)    Weakness generalized    Gout    Scoliosis of lumbar region due to degenerative disease of spine in adult    Depression    Lower extremity edema    Diabetes mellitus with complication (Multi)    Primary osteoarthritis of right knee    Fall    Acute kidney failure, unspecified (CMS-[facility])    Muscle weakness (generalized)    Primary osteoarthritis    Repeated falls    Paroxysmal atrial fibrillation (Multi)    Chronic diastolic (congestive) heart failure (Multi)    Chronic obstructive pulmonary disease, unspecified (Multi)    CKD (chronic kidney disease)    Normocytic anemia    Essential (primary) hypertension    HLD (hyperlipidemia)    Type 2 diabetes mellitus with hyperglycemia, without long-term current use of insulin (Multi)    Type 2 diabetes mellitus without complications (Multi)    Obesity (BMI 30-39.9)    Cellulitis of toe of right foot    Acute kidney injury (nontraumatic) (CMS-[facility])    ESRD (end stage renal disease) on dialysis (Multi)    Anemia due to chronic kidney disease, on chronic dialysis (Multi)     "Elevated troponin    Thickening of wall of gallbladder    Allergy, unspecified, sequela    Anaphylactic shock, unspecified, sequela    Articular cartilage disorder of hip    Bone marrow disorder    Coagulation defect, unspecified (Multi)    Contact with and (suspected) exposure to covid-19    Fracture of bone of hip (Multi)    History of anemia    Hyperkalemia    Pain of lower extremity    Pain, unspecified    Plantar fasciitis    Pneumonia due to infectious organism    Chest pain    Acute pulmonary edema (Multi)    Chronic cholecystitis    Fall, initial encounter    Elevated bilirubin    Hyperglycemia    Elevated lipase        ASSESSMENT:   Klebsiella pneumoniae/ variicola bacteremia, source ?urinary vs GI   - CXR, lungs are clear. No acute process   - CT A/P- Percutaneous drainage tube noted within the maria isabel hepatis. This  appears to be related to a decompressed gallbladder versus positioning  status post cholecystectomy with residual fluid within the gallbladder  fossa. Clinical correlation for operative history recommended.  - US RUQ; \"Sludge and stones within the gallbladder with edematous wall  thickening.  No biliary dilatation is appreciated. Increased echogenicity and thinning of the right renal cortex consistent with intrinsic renal disease.  No hydronephrosis\".     Elevated LFT's   - Gradually down trending   - Hepatitis panel negative   - US abdomen shows sludge and stones within the gallbladder, no biliary dilatation      ESRD on Hemodialysis   - Renal following for HD management      T2DM  - SSI per primary      Anemia  - S/P 1 unit PRBC transfusion on 5/20  - Monitoring H&H, transfuse as needed per primary     HTN  HLD        PLAN  C/W Ceftriaxone, increase to 2G in setting of bacteremia   C/w Metronidazole   Follow repeat blood cx   Check urine cx  Reviewed VBG  Monitor temps and counts   Monitor mentation and clinical response to antibiotics alone.  If continues to have mentation changes or fevers " or uptrending LFTs may need to consider surgical reevaluation  Discussed with Dr. Herbert    Discharge planning once improved on oral Augmentin till 6/6/2024. Will follow peripherally. Call w qs or change in clinical status    MEDICATIONS: reviewed.    Current Facility-Administered Medications:     acetaminophen (Tylenol) tablet 650 mg, 650 mg, oral, q6h PRN, Antwan Leavitt MD, 650 mg at 05/22/24 1432    amLODIPine (Norvasc) tablet 10 mg, 10 mg, oral, Daily, Pollo Herbert MD PhD, 10 mg at 05/25/24 0837    ascorbic acid (Vitamin C) tablet 250 mg, 250 mg, oral, Daily, Ronald Grimes MD, 250 mg at 05/25/24 0837    bisacodyl (Dulcolax) EC tablet 10 mg, 10 mg, oral, Daily PRN, Saurabh Vo,     butalbital-acetaminophen-caff -40 mg per tablet 1 tablet, 1 tablet, oral, q4h PRN, Kate Edmond MD, 1 tablet at 05/23/24 2129    carvedilol (Coreg) tablet 12.5 mg, 12.5 mg, oral, BID, Pollo Herbert MD PhD, 12.5 mg at 05/25/24 2012    cefTRIAXone (Rocephin) 2 g in dextrose 5% in water (D5W) 50 mL, 2 g, intravenous, q24h, Gerri Manning, APRN-CNP, Stopped at 05/25/24 1737    dextrose 50 % injection 12.5 g, 12.5 g, intravenous, q15 min PRN, Saurabh Vo DO    dextrose 50 % injection 25 g, 25 g, intravenous, q15 min PRN, Saurabh Vo DO    glucagon (Glucagen) injection 1 mg, 1 mg, intramuscular, q15 min PRN, Saurabh Vo DO    glucagon (Glucagen) injection 1 mg, 1 mg, intramuscular, q15 min PRN, Saurabh oV DO    guaiFENesin (Mucinex) 12 hr tablet 600 mg, 600 mg, oral, q12h PRN, Saurabh Vo DO    heparin (porcine) injection 5,000 Units, 5,000 Units, subcutaneous, q8h [facility], Kate Edmond MD, 5,000 Units at 05/26/24 0534    heparin 1,000 unit/mL injection 2,000 Units, 2,000 Units, intra-catheter, After Dialysis, Pinky Christie DO, 1,600 Units at 05/22/24 1212    heparin 1,000 unit/mL injection 2,000 Units, 2,000 Units, intra-catheter, After Dialysis, Pinky Christie DO, 1,600 Units at  "24 1212    hydrALAZINE (Apresoline) injection 10 mg, 10 mg, intravenous, q6h PRN, Saurabh Vo DO, 10 mg at 24 0430    insulin lispro (HumaLOG) injection 0-5 Units, 0-5 Units, subcutaneous, TID, Saurabh Vo DO, 1 Units at 24 1707    melatonin tablet 3 mg, 3 mg, oral, Nightly PRN, Saurabh Vo DO    metroNIDAZOLE (Flagyl) 500 mg in NaCl (iso-os) 100 mL, 500 mg, intravenous, q8h, Pollo Herbert MD PhD, Stopped at 24 0634    multivitamin 1 tablet, 1 tablet, oral, Daily, Ronald Grimes MD, 1 tablet at 24 0837    ondansetron (Zofran) tablet 4 mg, 4 mg, oral, q8h PRN **OR** ondansetron (Zofran) injection 4 mg, 4 mg, intravenous, q8h PRN, Saurabh Vo DO    pantoprazole (ProtoNix) EC tablet 40 mg, 40 mg, oral, Daily before breakfast, 40 mg at 24 0534 **OR** pantoprazole (ProtoNix) injection 40 mg, 40 mg, intravenous, Daily before breakfast, Saurabh Vo DO, 40 mg at 24 0617    pravastatin (Pravachol) tablet 40 mg, 40 mg, oral, Nightly, Saurabh Vo DO, 40 mg at 24    pregabalin (Lyrica) capsule 100 mg, 100 mg, oral, BID, Saurabh Vo DO, 100 mg at 24    sodium chloride 0.9 % irrigation solution 10 mL, 10 mL, intra-catheter, BID, Ronald Grimes MD, 10 mL at 24 2100    zinc sulfate (Zincate) capsule 50 mg of elemental zinc, 50 mg of elemental zinc, oral, Daily, Ronald Grimes MD, 50 mg of elemental zinc at 24 0837     PHYSICAL EXAM:  Vital signs: /73 (BP Location: Right arm, Patient Position: Lying)   Pulse 69   Temp 36.8 °C (98.3 °F) (Temporal)   Resp 18   Ht 1.549 m (5' 1\")   Wt 74.6 kg (164 lb 7.4 oz)   SpO2 94%   BMI 31.08 kg/m²   Temp (24hrs), Av.9 °C (98.4 °F), Min:36.7 °C (98 °F), Max:37.3 °C (99.2 °F)    General: alert, oriented, NAD  Lungs: bilaterally clear to auscultation  Heart: regular rate and rhythm  Abdomen: soft, non tender, non distended, BS+  Extremities: no edema  No " rashes  No joint inflammation  Neck supple  Lines ok  No CVAT              Labs:    Results for orders placed or performed during the hospital encounter of 05/19/24 (from the past 96 hour(s))   Blood Culture    Specimen: Peripheral Venipuncture; Blood culture   Result Value Ref Range    Blood Culture No growth at 3 days    Blood Culture    Specimen: Peripheral Venipuncture; Blood culture   Result Value Ref Range    Blood Culture No growth at 3 days    POCT GLUCOSE   Result Value Ref Range    POCT Glucose 230 (H) 74 - 99 mg/dL   Urine culture    Specimen: Clean Catch/Voided; Urine   Result Value Ref Range    Urine Culture No significant growth    POCT GLUCOSE   Result Value Ref Range    POCT Glucose 160 (H) 74 - 99 mg/dL   CBC and Auto Differential   Result Value Ref Range    WBC 3.9 (L) 4.4 - 11.3 x10*3/uL    nRBC 0.5 (H) 0.0 - 0.0 /100 WBCs    RBC 2.68 (L) 4.00 - 5.20 x10*6/uL    Hemoglobin 7.7 (L) 12.0 - 16.0 g/dL    Hematocrit 24.2 (L) 36.0 - 46.0 %    MCV 90 80 - 100 fL    MCH 28.7 26.0 - 34.0 pg    MCHC 31.8 (L) 32.0 - 36.0 g/dL    RDW 25.7 (H) 11.5 - 14.5 %    Platelets 196 150 - 450 x10*3/uL    Neutrophils % 46.6 40.0 - 80.0 %    Immature Granulocytes %, Automated 0.8 0.0 - 0.9 %    Lymphocytes % 34.2 13.0 - 44.0 %    Monocytes % 9.6 2.0 - 10.0 %    Eosinophils % 8.3 0.0 - 6.0 %    Basophils % 0.5 0.0 - 2.0 %    Neutrophils Absolute 1.80 1.60 - 5.50 x10*3/uL    Immature Granulocytes Absolute, Automated 0.03 0.00 - 0.50 x10*3/uL    Lymphocytes Absolute 1.32 0.80 - 3.00 x10*3/uL    Monocytes Absolute 0.37 0.05 - 0.80 x10*3/uL    Eosinophils Absolute 0.32 0.00 - 0.40 x10*3/uL    Basophils Absolute 0.02 0.00 - 0.10 x10*3/uL   Comprehensive Metabolic Panel   Result Value Ref Range    Glucose 109 (H) 74 - 99 mg/dL    Sodium 138 136 - 145 mmol/L    Potassium 3.6 3.5 - 5.3 mmol/L    Chloride 103 98 - 107 mmol/L    Bicarbonate 29 21 - 32 mmol/L    Anion Gap 10 10 - 20 mmol/L    Urea Nitrogen 13 6 - 23 mg/dL     Creatinine 1.77 (H) 0.50 - 1.05 mg/dL    eGFR 29 (L) >60 mL/min/1.73m*2    Calcium 8.9 8.6 - 10.3 mg/dL    Albumin 3.1 (L) 3.4 - 5.0 g/dL    Alkaline Phosphatase 197 (H) 33 - 136 U/L    Total Protein 5.5 (L) 6.4 - 8.2 g/dL    AST 21 9 - 39 U/L    Bilirubin, Total 0.6 0.0 - 1.2 mg/dL    ALT 52 (H) 7 - 45 U/L   Magnesium   Result Value Ref Range    Magnesium 1.66 1.60 - 2.40 mg/dL   Morphology   Result Value Ref Range    RBC Morphology See Below     Hypochromia Mild     Ovalocytes Few     Teardrop Cells Few    POCT GLUCOSE   Result Value Ref Range    POCT Glucose 162 (H) 74 - 99 mg/dL   POCT GLUCOSE   Result Value Ref Range    POCT Glucose 210 (H) 74 - 99 mg/dL   POCT GLUCOSE   Result Value Ref Range    POCT Glucose 191 (H) 74 - 99 mg/dL   POCT GLUCOSE   Result Value Ref Range    POCT Glucose 133 (H) 74 - 99 mg/dL   Blood Gas Venous Full Panel   Result Value Ref Range    POCT pH, Venous 7.45 (H) 7.33 - 7.43 pH    POCT pCO2, Venous 40 (L) 41 - 51 mm Hg    POCT pO2, Venous 51 (H) 35 - 45 mm Hg    POCT SO2, Venous 87 (H) 45 - 75 %    POCT Oxy Hemoglobin, Venous 84.8 (H) 45.0 - 75.0 %    POCT Hematocrit Calculated, Venous 24.0 (L) 36.0 - 46.0 %    POCT Sodium, Venous 133 (L) 136 - 145 mmol/L    POCT Potassium, Venous 4.0 3.5 - 5.3 mmol/L    POCT Chloride, Venous 103 98 - 107 mmol/L    POCT Ionized Calicum, Venous 1.28 1.10 - 1.33 mmol/L    POCT Glucose, Venous 121 (H) 74 - 99 mg/dL    POCT Lactate, Venous 1.3 0.4 - 2.0 mmol/L    POCT Base Excess, Venous 3.5 (H) -2.0 - 3.0 mmol/L    POCT HCO3 Calculated, Venous 27.8 (H) 22.0 - 26.0 mmol/L    POCT Hemoglobin, Venous 7.9 (L) 12.0 - 16.0 g/dL    POCT Anion Gap, Venous 6.0 (L) 10.0 - 25.0 mmol/L    Patient Temperature 37.0 degrees Celsius    FiO2 0 %   POCT GLUCOSE   Result Value Ref Range    POCT Glucose 161 (H) 74 - 99 mg/dL   Urinalysis with Reflex Culture and Microscopic   Result Value Ref Range    Color, Urine Light-Yellow Light-Yellow, Yellow, Dark-Yellow    Appearance,  Urine Clear Clear    Specific Gravity, Urine 1.006 1.005 - 1.035    pH, Urine 7.0 5.0, 5.5, 6.0, 6.5, 7.0, 7.5, 8.0    Protein, Urine 70 (1+) (A) NEGATIVE, 10 (TRACE), 20 (TRACE) mg/dL    Glucose, Urine Normal Normal mg/dL    Blood, Urine NEGATIVE NEGATIVE    Ketones, Urine NEGATIVE NEGATIVE mg/dL    Bilirubin, Urine NEGATIVE NEGATIVE    Urobilinogen, Urine Normal Normal mg/dL    Nitrite, Urine NEGATIVE NEGATIVE    Leukocyte Esterase, Urine NEGATIVE NEGATIVE   Extra Urine Gray Tube   Result Value Ref Range    Extra Tube Hold for add-ons.    Urinalysis Microscopic   Result Value Ref Range    WBC, Urine NONE 1-5, NONE /HPF    RBC, Urine NONE NONE, 1-2, 3-5 /HPF    Squamous Epithelial Cells, Urine 1-9 (SPARSE) Reference range not established. /HPF   POCT GLUCOSE   Result Value Ref Range    POCT Glucose 128 (H) 74 - 99 mg/dL   POCT GLUCOSE   Result Value Ref Range    POCT Glucose 123 (H) 74 - 99 mg/dL   POCT GLUCOSE   Result Value Ref Range    POCT Glucose 181 (H) 74 - 99 mg/dL   POCT GLUCOSE   Result Value Ref Range    POCT Glucose 165 (H) 74 - 99 mg/dL   POCT GLUCOSE   Result Value Ref Range    POCT Glucose 137 (H) 74 - 99 mg/dL   POCT GLUCOSE   Result Value Ref Range    POCT Glucose 234 (H) 74 - 99 mg/dL   POCT GLUCOSE   Result Value Ref Range    POCT Glucose 185 (H) 74 - 99 mg/dL   POCT GLUCOSE   Result Value Ref Range    POCT Glucose 148 (H) 74 - 99 mg/dL   POCT GLUCOSE   Result Value Ref Range    POCT Glucose 128 (H) 74 - 99 mg/dL     *Note: Due to a large number of results and/or encounters for the requested time period, some results have not been displayed. A complete set of results can be found in Results Review.        Microbiology data: reviewed    Imaging data: reviewed      Daquan Sandhu  Pager:[phone]  Date of service: 5/26/2024  Time of service: 8:25 AM

## 2024-05-26 NOTE — CARE PLAN
The patient's goals for the shift include      The clinical goals for the shift include Pt will remian free of falls.

## 2024-05-26 NOTE — CARE PLAN
The patient's goals for the shift include  vss this shift    The clinical goals for the shift include pt will remain free from falls this shift

## 2024-05-26 NOTE — PROGRESS NOTES
Tana Hargrove is a 79 y.o. female on day 6 of admission presenting with Fall, initial encounter.      Subjective   Tana Hargrove is a 79 y.o. female with PMHx s/f CAD, HTN, COPD, ESRD on HD via permacath, DM2, MDS, gout, neuropathy presenting for a fall. Pt lives at home and admits she rolled out of bed onto the floor this morning. Denies head injury or LOC. She has been admitted 6 times this year already for multiple issues. Most recently last admission she had a cholecystostomy tube placed for chronic cholecystitis. She also had an ex lap to remove adhesions between her liver and anterior abdominal wall that were encasing her gallbladder. She denies any other real symptoms at this time. No worsening abdominal pain, fever, chills, issues with the cholecystostomy tube, nausea, vomiting, chest pain, or other symptoms. She has been compliant with taking her medications and with her hemodialysis schedule.     ED Course (Summary):   Vitals on presentation: 100.4 F, 98 bpm, 20 rr, 145/53 --> 180/71, 93% on RA  Labs: CMP glu 165, Cr 2.38, alk phos 292, , , total bilirubin 4.7, lipase 146, Trop 62 --> 63  CBC WBC 9.6, Hg 7.5, Plt 210  Imaging: CXR - no acute process. Agree with interpretation.  XR pelvis - no acute osseous abnormality. Agree with interpretation.  CT a/p - Percutaneous drainage tube noted within the maria isabel hepatis. This  appears to be related to a decompressed gallbladder versus positioning  status post cholecystectomy with residual fluid within the gallbladder  fossa. Clinical correlation for operative history recommended. Agree with interpretation.  C. US gallbladder - Sludge and stones within the gallbladder with edematous wall thickening. No biliary dilatation is appreciated.   Interventions: Admitted to observation for multiple lab abnormalities, mainly LFTs and bilirubin      05/20: Patient was evaluated this morning, awake and alert, denies having chest pain or shortness of breath, no  nausea or vomiting.  Liver function is improving  05/21: Patient was evaluated this a.m., denies having fever or chills, no nausea vomiting, no chest pain or shortness of breath.  Evaluated by cardiology for elevated troponin and EKG changes-recommended medical management and signed off.  Liver function gradually downtrending.  05/22: Patient denies having fever or chills, no nausea or vomiting.  Blood culture growing GNB in 1 bottle.     5/23: She's complaining of mild diffuse pruritus.     Later on 5/23: Patient's nurse noted a changed in her mental status and asked me to come evaluate. This AM when I evaluated patient she was awake and alert and cogent. On my arrival she was tired appearing. She was able to stay awake and was engaging in cogent conversation but appeared to be ready to fall asleep at any time. She was hypertensive but has been so throughout the day. BG was normal. She stated she did not want to go for a CTH. We will check a VBG to assess for hypercarbia and a UA to assess for UTI     5/24: Patient is back to her normal mentation. Per her son she did have what sounds like hospital delirium overnight. Initial plan was home with [facility] but she and her family would now like SNF.     5/25: She's much improved today. No further episodes of delirium. Awaiting auth.              Objective     Last Recorded Vitals  /73 (BP Location: Right arm, Patient Position: Lying)   Pulse 69   Temp 36.8 °C (98.3 °F) (Temporal)   Resp 18   Wt 74.6 kg (164 lb 7.4 oz)   SpO2 94%   Intake/Output last 3 Shifts:    Intake/Output Summary (Last 24 hours) at 5/26/2024 1026  Last data filed at 5/26/2024 0534  Gross per 24 hour   Intake 700 ml   Output 1190 ml   Net -490 ml       Admission Weight  Weight: 69.9 kg (154 lb) (05/19/24 1117)    Daily Weight  05/26/24 : 74.6 kg (164 lb 7.4 oz)    Image Results  ECG 12 lead  Sinus rhythm  Minimal ST depression, lateral leads  Prolonged QT interval    See ED provider note for  full interpretation and clinical correlation  Confirmed by Terri Bermudez ([id number]) on 5/25/2024 1:12:33 PM      Physical Exam  Patient is awake and orient, not in apparent distress  Eyes: PERRLA, no conjunctival congestion  Chest: Bilateral Air entry, no crackles or wheezing  Heart: s1S2 regular, no murmur  Abdomen: Soft, non tender, BS present, cholecystostomy tube in place  Ext:    Relevant Results               Assessment/Plan   This patient currently has cardiac telemetry ordered; if you would like to modify or discontinue the telemetry order, click here to go to the orders activity to modify/discontinue the order.    This patient has a central line   Reason for the central line remaining today? Dialysis/Hemapheresis    Sepsis with organ dysfunction of elevated bilirubin  -5/20 Bcx with Klebsiella pneumoniae  -5/22 Bcx NGTD   -Continue ceftriaxone 2g every day. Abx stop date 6/5   -ID following    Elevated liver enzymes  US abdomen shows sludge and stones within the gallbladder, no biliary dilatation.  Hepatitis panel: Negative  Liver function gradually downtrending     ESRD on hemodialysis  Renal consulted for HD management     Elevated troponins:  Patient denies having chest pain, EKG shows minimal ST depression in lateral leads  Cardiology consulted-recommended medical management and signed off.     DM2:  SSI     Normocytic anemia:  Status post 1 unit of PRBC transfusion on 05/20  Monitor H&H and transfuse as needed     HTN:  Home amlodipine, Lopressor     HLD:  Home statin     Renal diet  DVT ppx: Heparin subcutaneous  DNR arrest, no intubation     Physical debility/deconditioning  PT/OT, planning on SNF DC     Hypokalemia  Replace and monitor    Perioperative cardiovascular risk stratification  Patient was evaluated by cardiology and feels that patient has elevated risk for perioperative MACE based on functional capacity.         Pollo Herbert MD PhD

## 2024-05-27 ENCOUNTER — APPOINTMENT (OUTPATIENT)
Dept: DIALYSIS | Facility: HOSPITAL | Age: 79
End: 2024-05-27
Payer: MEDICARE

## 2024-05-27 LAB
GLUCOSE BLD MANUAL STRIP-MCNC: 119 MG/DL (ref 74–99)
GLUCOSE BLD MANUAL STRIP-MCNC: 216 MG/DL (ref 74–99)
GLUCOSE BLD MANUAL STRIP-MCNC: 230 MG/DL (ref 74–99)

## 2024-05-27 PROCEDURE — 8010000001 HC DIALYSIS - HEMODIALYSIS PER DAY

## 2024-05-27 PROCEDURE — 2500000004 HC RX 250 GENERAL PHARMACY W/ HCPCS (ALT 636 FOR OP/ED): Performed by: NURSE PRACTITIONER

## 2024-05-27 PROCEDURE — 2500000004 HC RX 250 GENERAL PHARMACY W/ HCPCS (ALT 636 FOR OP/ED): Performed by: INTERNAL MEDICINE

## 2024-05-27 PROCEDURE — 82947 ASSAY GLUCOSE BLOOD QUANT: CPT

## 2024-05-27 PROCEDURE — 2500000001 HC RX 250 WO HCPCS SELF ADMINISTERED DRUGS (ALT 637 FOR MEDICARE OP): Performed by: SURGERY

## 2024-05-27 PROCEDURE — 2500000004 HC RX 250 GENERAL PHARMACY W/ HCPCS (ALT 636 FOR OP/ED): Performed by: SURGERY

## 2024-05-27 PROCEDURE — 2500000004 HC RX 250 GENERAL PHARMACY W/ HCPCS (ALT 636 FOR OP/ED): Mod: JZ | Performed by: INTERNAL MEDICINE

## 2024-05-27 PROCEDURE — 2500000001 HC RX 250 WO HCPCS SELF ADMINISTERED DRUGS (ALT 637 FOR MEDICARE OP): Performed by: STUDENT IN AN ORGANIZED HEALTH CARE EDUCATION/TRAINING PROGRAM

## 2024-05-27 PROCEDURE — 2060000001 HC INTERMEDIATE ICU ROOM DAILY

## 2024-05-27 PROCEDURE — 2500000001 HC RX 250 WO HCPCS SELF ADMINISTERED DRUGS (ALT 637 FOR MEDICARE OP): Performed by: INTERNAL MEDICINE

## 2024-05-27 PROCEDURE — A4217 STERILE WATER/SALINE, 500 ML: HCPCS | Performed by: SURGERY

## 2024-05-27 PROCEDURE — 99233 SBSQ HOSP IP/OBS HIGH 50: CPT | Performed by: INTERNAL MEDICINE

## 2024-05-27 RX ADMIN — METRONIDAZOLE 500 MG: 500 INJECTION, SOLUTION INTRAVENOUS at 15:48

## 2024-05-27 RX ADMIN — HEPARIN SODIUM 1600 UNITS: 1000 INJECTION INTRAVENOUS; SUBCUTANEOUS at 12:59

## 2024-05-27 RX ADMIN — HEPARIN SODIUM 5000 UNITS: 5000 INJECTION INTRAVENOUS; SUBCUTANEOUS at 06:44

## 2024-05-27 RX ADMIN — METRONIDAZOLE 500 MG: 500 INJECTION, SOLUTION INTRAVENOUS at 06:44

## 2024-05-27 RX ADMIN — AMLODIPINE BESYLATE 10 MG: 10 TABLET ORAL at 08:50

## 2024-05-27 RX ADMIN — OXYCODONE HYDROCHLORIDE AND ACETAMINOPHEN 250 MG: 500 TABLET ORAL at 08:50

## 2024-05-27 RX ADMIN — CARVEDILOL 12.5 MG: 12.5 TABLET, FILM COATED ORAL at 20:32

## 2024-05-27 RX ADMIN — CEFTRIAXONE SODIUM 2 G: 2 INJECTION, SOLUTION INTRAVENOUS at 17:55

## 2024-05-27 RX ADMIN — HEPARIN SODIUM 5000 UNITS: 5000 INJECTION INTRAVENOUS; SUBCUTANEOUS at 21:14

## 2024-05-27 RX ADMIN — PREGABALIN 100 MG: 25 CAPSULE ORAL at 08:50

## 2024-05-27 RX ADMIN — PRAVASTATIN SODIUM 40 MG: 40 TABLET ORAL at 20:32

## 2024-05-27 RX ADMIN — CARVEDILOL 12.5 MG: 12.5 TABLET, FILM COATED ORAL at 08:50

## 2024-05-27 RX ADMIN — PANTOPRAZOLE SODIUM 40 MG: 40 TABLET, DELAYED RELEASE ORAL at 06:44

## 2024-05-27 RX ADMIN — SODIUM CHLORIDE 10 ML: 900 IRRIGANT IRRIGATION at 09:00

## 2024-05-27 RX ADMIN — HEPARIN SODIUM 5000 UNITS: 5000 INJECTION INTRAVENOUS; SUBCUTANEOUS at 15:47

## 2024-05-27 RX ADMIN — Medication 50 MG OF ELEMENTAL ZINC: at 08:50

## 2024-05-27 RX ADMIN — PREGABALIN 100 MG: 25 CAPSULE ORAL at 20:32

## 2024-05-27 RX ADMIN — METRONIDAZOLE 500 MG: 500 INJECTION, SOLUTION INTRAVENOUS at 21:13

## 2024-05-27 RX ADMIN — SODIUM CHLORIDE 10 ML: 900 IRRIGANT IRRIGATION at 21:00

## 2024-05-27 RX ADMIN — MULTIVITAMIN TABLET 1 TABLET: TABLET at 08:50

## 2024-05-27 ASSESSMENT — COGNITIVE AND FUNCTIONAL STATUS - GENERAL
HELP NEEDED FOR BATHING: A LITTLE
DRESSING REGULAR UPPER BODY CLOTHING: A LITTLE
CLIMB 3 TO 5 STEPS WITH RAILING: A LITTLE
PERSONAL GROOMING: A LITTLE
MOVING TO AND FROM BED TO CHAIR: A LITTLE
MOBILITY SCORE: 20
TOILETING: A LITTLE
STANDING UP FROM CHAIR USING ARMS: A LITTLE
DRESSING REGULAR LOWER BODY CLOTHING: A LITTLE
DAILY ACTIVITIY SCORE: 19
WALKING IN HOSPITAL ROOM: A LITTLE

## 2024-05-27 ASSESSMENT — PAIN SCALES - GENERAL
PAINLEVEL_OUTOF10: 0 - NO PAIN

## 2024-05-27 ASSESSMENT — PAIN - FUNCTIONAL ASSESSMENT
PAIN_FUNCTIONAL_ASSESSMENT: 0-10

## 2024-05-27 NOTE — PROGRESS NOTES
Tana Hargrove is a 79 y.o. female on day 7 of admission presenting with Fall, initial encounter.      Subjective   Tana Hargrove is a 79 y.o. female with PMHx s/f CAD, HTN, COPD, ESRD on HD via permacath, DM2, MDS, gout, neuropathy presenting for a fall. Pt lives at home and admits she rolled out of bed onto the floor this morning. Denies head injury or LOC. She has been admitted 6 times this year already for multiple issues. Most recently last admission she had a cholecystostomy tube placed for chronic cholecystitis. She also had an ex lap to remove adhesions between her liver and anterior abdominal wall that were encasing her gallbladder. She denies any other real symptoms at this time. No worsening abdominal pain, fever, chills, issues with the cholecystostomy tube, nausea, vomiting, chest pain, or other symptoms. She has been compliant with taking her medications and with her hemodialysis schedule.     ED Course (Summary):   Vitals on presentation: 100.4 F, 98 bpm, 20 rr, 145/53 --> 180/71, 93% on RA  Labs: CMP glu 165, Cr 2.38, alk phos 292, , , total bilirubin 4.7, lipase 146, Trop 62 --> 63  CBC WBC 9.6, Hg 7.5, Plt 210  Imaging: CXR - no acute process. Agree with interpretation.  XR pelvis - no acute osseous abnormality. Agree with interpretation.  CT a/p - Percutaneous drainage tube noted within the maria isabel hepatis. This  appears to be related to a decompressed gallbladder versus positioning  status post cholecystectomy with residual fluid within the gallbladder  fossa. Clinical correlation for operative history recommended. Agree with interpretation.  C. US gallbladder - Sludge and stones within the gallbladder with edematous wall thickening. No biliary dilatation is appreciated.   Interventions: Admitted to observation for multiple lab abnormalities, mainly LFTs and bilirubin      05/20: Patient was evaluated this morning, awake and alert, denies having chest pain or shortness of breath, no  nausea or vomiting.  Liver function is improving  05/21: Patient was evaluated this a.m., denies having fever or chills, no nausea vomiting, no chest pain or shortness of breath.  Evaluated by cardiology for elevated troponin and EKG changes-recommended medical management and signed off.  Liver function gradually downtrending.  05/22: Patient denies having fever or chills, no nausea or vomiting.  Blood culture growing GNB in 1 bottle.     5/23: She's complaining of mild diffuse pruritus.     Later on 5/23: Patient's nurse noted a changed in her mental status and asked me to come evaluate. This AM when I evaluated patient she was awake and alert and cogent. On my arrival she was tired appearing. She was able to stay awake and was engaging in cogent conversation but appeared to be ready to fall asleep at any time. She was hypertensive but has been so throughout the day. BG was normal. She stated she did not want to go for a CTH. We will check a VBG to assess for hypercarbia and a UA to assess for UTI     5/24: Patient is back to her normal mentation. Per her son she did have what sounds like hospital delirium overnight. Initial plan was home with [facility] but she and her family would now like SNF.     5/25: She's much improved today. No further episodes of delirium. Awaiting auth.     5/27: Patient now states she wishes to go home. She is without complaint.         Objective     Last Recorded Vitals  /69 (BP Location: Right arm, Patient Position: Lying)   Pulse 64   Temp 36.8 °C (98.2 °F) (Temporal)   Resp 18   Wt 76.5 kg (168 lb 10.4 oz)   SpO2 96%   Intake/Output last 3 Shifts:    Intake/Output Summary (Last 24 hours) at 5/27/2024 1107  Last data filed at 5/27/2024 1048  Gross per 24 hour   Intake 1891 ml   Output 450 ml   Net 1441 ml       Admission Weight  Weight: 69.9 kg (154 lb) (05/19/24 1117)    Daily Weight  05/27/24 : 76.5 kg (168 lb 10.4 oz)    Image Results  ECG 12 lead  Sinus rhythm  Minimal ST  depression, lateral leads  Prolonged QT interval    See ED provider note for full interpretation and clinical correlation  Confirmed by Terri Bermudez ([id number]) on 5/25/2024 1:12:33 PM      Physical Exam  Patient is awake and orient, not in apparent distress  Eyes: PERRLA, no conjunctival congestion  Chest: Bilateral Air entry, no crackles or wheezing  Heart: s1S2 regular, no murmur  Abdomen: Soft, non tender, BS present, cholecystostomy tube in place  Ext:    Relevant Results               Assessment/Plan   This patient currently has cardiac telemetry ordered; if you would like to modify or discontinue the telemetry order, click here to go to the orders activity to modify/discontinue the order.    This patient has a central line   Reason for the central line remaining today? Dialysis/Hemapheresis    Sepsis with organ dysfunction of elevated bilirubin  -5/20 Bcx with Klebsiella pneumoniae  -5/22 Bcx NGTD   -Continue ceftriaxone 2g every day. Abx stop date 6/5   -ID following    Elevated liver enzymes  US abdomen shows sludge and stones within the gallbladder, no biliary dilatation.  Hepatitis panel: Negative  Liver function gradually downtrending     ESRD on hemodialysis  Renal consulted for HD management     Elevated troponins:  Patient denies having chest pain, EKG shows minimal ST depression in lateral leads  Cardiology consulted-recommended medical management and signed off.     DM2:  SSI     Normocytic anemia:  Status post 1 unit of PRBC transfusion on 05/20  Monitor H&H and transfuse as needed     HTN:  Home amlodipine, Lopressor     HLD:  Home statin     Renal diet  DVT ppx: Heparin subcutaneous  DNR arrest, no intubation     Physical debility/deconditioning  PT/OT, planning on SNF DC     Hypokalemia  Replace and monitor    Perioperative cardiovascular risk stratification  Patient was evaluated by cardiology and feels that patient has elevated risk for perioperative MACE based on functional capacity.          Pollo Herbert MD PhD

## 2024-05-27 NOTE — PROCEDURES
Report to Receiving RN:    Report To: Devan Dahl RN  Time Report Called: 1307  Hand-Off Communication:   BP did not tolerate fluid removal d/t pt receiving BP meds prior to HD. Removed .574 L. Post vitals: 99.5*F - 154/61 (82) - 71.21 kg  Complications During Treatment: No  Ultrafiltration Treatment: No  Medications Administered During Dialysis: No  Blood Products Administered During Dialysis: No  Labs Sent During Dialysis: No  Heparin Drip Rate Changes: N/A  Dialysis Catheter Dressing: Changed 05/27/2024 and initialed TE  Last Dressing Change: 05/27/2024    Electronic Signatures:  Macey Waller OCDT    Last Updated: 1:13 PM by MACEY WALLER

## 2024-05-27 NOTE — PROCEDURES
Report from Sending RN:    Report From: Devan Dahl RN  Recent Surgery of Procedure: Yes, bili-drain  Baseline Level of Consciousness (LOC): x4  Oxygen Use: No  Type: RA  Diabetic: Yes  Last BP Med Given Day of Dialysis:   See EMAR  Last Pain Med Given: See EMAR  Lab Tests to be Obtained with Dialysis: No  Blood Transfusion to be Given During Dialysis: No  Available IV Access: Yes  Medications to be Administered During Dialysis: No  Continuous IV Infusion Running: No  Restraints on Currently or in the Last 24 Hours: No  Hand-Off Communication: Pt is stable for HD and PCNA will bring her right up  Dialysis Catheter Dressing: Will check prior to HD  Last Dressing Change: Will check prior to HD

## 2024-05-27 NOTE — PROGRESS NOTES
Message received from Dr Herbert, patient adamant about being discharged home instead of SNF . ADOD is tomorrow. Informed Dr Herbert that patient was active with [facility] prior to admission. Will plan to DC tomorrow with resumption of care with [facility]. TCC following.    Chaya has been received for [facility]. TCC met with patient. Patient will speak with her children. [facility] still needs to change her HD clinic to [place] tomorrow. TCC will follow

## 2024-05-27 NOTE — PROGRESS NOTES
"      Nephrology Progress Note      Nephrology following for ESRD.   Events over night:     Patient is awake and has no new issues overnight  She is about to go to dialysis.  BP is running on the higher side.      /69 (BP Location: Right arm, Patient Position: Lying)   Pulse 73   Temp 36.8 °C (98.2 °F) (Temporal)   Resp 18   Ht 1.549 m (5' 1\")   Wt 76.5 kg (168 lb 10.4 oz)   SpO2 96%   BMI 31.87 kg/m²     Input / Output:  24 HR:   Intake/Output Summary (Last 24 hours) at 5/27/2024 0900  Last data filed at 5/27/2024 0309  Gross per 24 hour   Intake 1071 ml   Output 450 ml   Net 621 ml       Physical Exam   Alert and oriented x 3 NAD  Neck: no JVD  CV: RRR  Lungs: CTA bilaterally  Abd: soft, NT, ND   Ext: No lower extremity edema    Scheduled medications  amLODIPine, 10 mg, oral, Daily  ascorbic acid, 250 mg, oral, Daily  carvedilol, 12.5 mg, oral, BID  cefTRIAXone, 2 g, intravenous, q24h  heparin (porcine), 5,000 Units, subcutaneous, q8h JOSE DE JESUS  heparin, 2,000 Units, intra-catheter, After Dialysis  heparin, 2,000 Units, intra-catheter, After Dialysis  insulin lispro, 0-5 Units, subcutaneous, TID  metroNIDAZOLE, 500 mg, intravenous, q8h  multivitamin, 1 tablet, oral, Daily  pantoprazole, 40 mg, oral, Daily before breakfast   Or  pantoprazole, 40 mg, intravenous, Daily before breakfast  pravastatin, 40 mg, oral, Nightly  pregabalin, 100 mg, oral, BID  sodium chloride, 10 mL, intra-catheter, BID  zinc sulfate, 50 mg of elemental zinc, oral, Daily      Continuous medications     PRN medications  PRN medications: acetaminophen, bisacodyl, butalbital-acetaminophen-caff, dextrose, dextrose, glucagon, glucagon, guaiFENesin, hydrALAZINE, melatonin, ondansetron **OR** ondansetron   Results from last 7 days   Lab Units 05/23/24  0430   SODIUM mmol/L 138   POTASSIUM mmol/L 3.6   CHLORIDE mmol/L 103   CO2 mmol/L 29   BUN mg/dL 13   CREATININE mg/dL 1.77*   CALCIUM mg/dL 8.9   PROTEIN TOTAL g/dL 5.5*   BILIRUBIN TOTAL " mg/dL 0.6   ALK PHOS U/L 197*   ALT U/L 52*   AST U/L 21   GLUCOSE mg/dL 109*      Results from last 7 days   Lab Units 05/23/24  0430 05/22/24  0502 05/21/24  0939   MAGNESIUM mg/dL 1.66 1.72 1.64      Results from last 7 days   Lab Units 05/23/24  0430 05/22/24  0502 05/21/24  0939   WBC AUTO x10*3/uL 3.9* 4.4 3.6*   HEMOGLOBIN g/dL 7.7* 7.4* 8.1*   HEMATOCRIT % 24.2* 22.1* 23.9*   PLATELETS AUTO x10*3/uL 196 195 189        Assessment & Plan:     Patient is 79 y.o. female history of DM 2, ESRD, hypertension who is admitted to hospital for fall. Nephrology consulted in view of ESRD.     ESRD  -Hemodialysis Monday Wednesday Friday at [facility]  -Has been compliant with dialysis     Hypertension  Accelerated    Hypokalemia     Anemia of ESRD and myelodysplastic syndrome  -She is on MARILYNN with dialysis  -Requirestransfusions despite MARILYNN  -Hemoglobin has been between 8 and 9 as an outpatient in the month of May     CKD-MBD  -Patient currently not on a phosphorus binder     Klebsiella BSI  Has percutaneous cutaneous cholecystotomy drain  -Surgery following  -ID  following  Elevated liver enzymes    HFpEF  -Compensated, does not have high intradialytic weight gains and minimal fluid removal required with dialysis     Recommendations:   -Hemodialysis today UF 1 L-hopefully dialysis will help with her accelerated hypertension today  -Continue continue HD Monday Wednesday Friday  -may Need cholecystectomy sooner  -Daily Nephrocaps  -Phos goal 3.5-5.5  -Transfuse for hemoglobin less than 7      Please message me through [employer] chat with any questions or concerns.     Pinky Christie DO  5/27/2024  9:00 AM     [facility]    [address], [address]   [place] [id number]  Office: [phone]

## 2024-05-28 VITALS
OXYGEN SATURATION: 96 % | DIASTOLIC BLOOD PRESSURE: 71 MMHG | BODY MASS INDEX: 29.93 KG/M2 | RESPIRATION RATE: 16 BRPM | SYSTOLIC BLOOD PRESSURE: 170 MMHG | TEMPERATURE: 97.8 F | HEIGHT: 61 IN | HEART RATE: 63 BPM | WEIGHT: 158.51 LBS

## 2024-05-28 PROBLEM — R79.89 ELEVATED TROPONIN: Status: RESOLVED | Noted: 2024-04-18 | Resolved: 2024-05-28

## 2024-05-28 PROBLEM — R74.8 ELEVATED LIPASE: Status: RESOLVED | Noted: 2024-05-19 | Resolved: 2024-05-28

## 2024-05-28 PROBLEM — R73.9 HYPERGLYCEMIA: Status: RESOLVED | Noted: 2024-05-19 | Resolved: 2024-05-28
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
## 2024-05-28 LAB
GLUCOSE BLD MANUAL STRIP-MCNC: 149 MG/DL (ref 74–99)
GLUCOSE BLD MANUAL STRIP-MCNC: 152 MG/DL (ref 74–99)

## 2024-05-28 PROCEDURE — A4217 STERILE WATER/SALINE, 500 ML: HCPCS | Performed by: SURGERY

## 2024-05-28 PROCEDURE — 2500000004 HC RX 250 GENERAL PHARMACY W/ HCPCS (ALT 636 FOR OP/ED): Mod: JZ | Performed by: INTERNAL MEDICINE

## 2024-05-28 PROCEDURE — 97110 THERAPEUTIC EXERCISES: CPT | Mod: GP,CQ

## 2024-05-28 PROCEDURE — 2500000001 HC RX 250 WO HCPCS SELF ADMINISTERED DRUGS (ALT 637 FOR MEDICARE OP): Performed by: INTERNAL MEDICINE

## 2024-05-28 PROCEDURE — 2500000001 HC RX 250 WO HCPCS SELF ADMINISTERED DRUGS (ALT 637 FOR MEDICARE OP): Performed by: SURGERY

## 2024-05-28 PROCEDURE — 2500000004 HC RX 250 GENERAL PHARMACY W/ HCPCS (ALT 636 FOR OP/ED): Performed by: INTERNAL MEDICINE

## 2024-05-28 PROCEDURE — 99239 HOSP IP/OBS DSCHRG MGMT >30: CPT | Performed by: INTERNAL MEDICINE

## 2024-05-28 PROCEDURE — 2500000001 HC RX 250 WO HCPCS SELF ADMINISTERED DRUGS (ALT 637 FOR MEDICARE OP): Performed by: STUDENT IN AN ORGANIZED HEALTH CARE EDUCATION/TRAINING PROGRAM

## 2024-05-28 PROCEDURE — 82947 ASSAY GLUCOSE BLOOD QUANT: CPT | Mod: 91

## 2024-05-28 PROCEDURE — 97116 GAIT TRAINING THERAPY: CPT | Mod: GP,CQ

## 2024-05-28 PROCEDURE — 2500000004 HC RX 250 GENERAL PHARMACY W/ HCPCS (ALT 636 FOR OP/ED): Performed by: SURGERY

## 2024-05-28 RX ORDER — LOSARTAN POTASSIUM 50 MG/1
50 TABLET ORAL DAILY
Qty: 30 TABLET | Refills: 1 | Status: CANCELLED | OUTPATIENT
Start: 2024-05-28

## 2024-05-28 RX ORDER — LOSARTAN POTASSIUM 50 MG/1
50 TABLET ORAL DAILY
Status: ON HOLD
Start: 2024-05-29 | End: 2025-05-29

## 2024-05-28 RX ORDER — CARVEDILOL 12.5 MG/1
12.5 TABLET ORAL 2 TIMES DAILY
Status: ON HOLD
Start: 2024-05-28

## 2024-05-28 RX ORDER — AMLODIPINE BESYLATE 10 MG/1
10 TABLET ORAL DAILY
Status: ON HOLD
Start: 2024-05-28

## 2024-05-28 RX ORDER — PREGABALIN 100 MG/1
CAPSULE ORAL
Qty: 6 CAPSULE | Refills: 0 | Status: ON HOLD | OUTPATIENT
Start: 2024-05-28

## 2024-05-28 RX ORDER — LOSARTAN POTASSIUM 50 MG/1
50 TABLET ORAL DAILY
Status: DISCONTINUED | OUTPATIENT
Start: 2024-05-28 | End: 2024-05-28 | Stop reason: HOSPADM

## 2024-05-28 RX ORDER — AMOXICILLIN AND CLAVULANATE POTASSIUM 500; 125 MG/1; MG/1
1 TABLET, FILM COATED ORAL 2 TIMES DAILY
Status: ON HOLD
Start: 2024-05-28 | End: 2024-06-06 | Stop reason: ALTCHOICE

## 2024-05-28 RX ADMIN — SODIUM CHLORIDE 10 ML: 900 IRRIGANT IRRIGATION at 09:00

## 2024-05-28 RX ADMIN — HEPARIN SODIUM 5000 UNITS: 5000 INJECTION INTRAVENOUS; SUBCUTANEOUS at 06:41

## 2024-05-28 RX ADMIN — PREGABALIN 100 MG: 25 CAPSULE ORAL at 09:04

## 2024-05-28 RX ADMIN — METRONIDAZOLE 500 MG: 500 INJECTION, SOLUTION INTRAVENOUS at 06:41

## 2024-05-28 RX ADMIN — CARVEDILOL 12.5 MG: 12.5 TABLET, FILM COATED ORAL at 09:04

## 2024-05-28 RX ADMIN — PANTOPRAZOLE SODIUM 40 MG: 40 TABLET, DELAYED RELEASE ORAL at 06:41

## 2024-05-28 RX ADMIN — OXYCODONE HYDROCHLORIDE AND ACETAMINOPHEN 250 MG: 500 TABLET ORAL at 09:04

## 2024-05-28 RX ADMIN — Medication 50 MG OF ELEMENTAL ZINC: at 09:04

## 2024-05-28 RX ADMIN — MULTIVITAMIN TABLET 1 TABLET: TABLET at 09:04

## 2024-05-28 RX ADMIN — AMLODIPINE BESYLATE 10 MG: 10 TABLET ORAL at 09:04

## 2024-05-28 RX ADMIN — LOSARTAN POTASSIUM 50 MG: 50 TABLET, FILM COATED ORAL at 10:39

## 2024-05-28 ASSESSMENT — COGNITIVE AND FUNCTIONAL STATUS - GENERAL
MOVING TO AND FROM BED TO CHAIR: A LITTLE
STANDING UP FROM CHAIR USING ARMS: A LOT
CLIMB 3 TO 5 STEPS WITH RAILING: A LOT
TURNING FROM BACK TO SIDE WHILE IN FLAT BAD: A LITTLE
WALKING IN HOSPITAL ROOM: A LITTLE
MOVING FROM LYING ON BACK TO SITTING ON SIDE OF FLAT BED WITH BEDRAILS: A LITTLE
MOBILITY SCORE: 16

## 2024-05-28 ASSESSMENT — PAIN SCALES - GENERAL
PAINLEVEL_OUTOF10: 0 - NO PAIN
PAINLEVEL_OUTOF10: 0 - NO PAIN

## 2024-05-28 ASSESSMENT — PAIN - FUNCTIONAL ASSESSMENT: PAIN_FUNCTIONAL_ASSESSMENT: 0-10

## 2024-05-28 NOTE — DISCHARGE SUMMARY
DISCHARGE SUMMARY     Discharge Diagnosis  Fall, initial encounter    Issues Requiring Follow-Up  -Elective cholecystectomy scheduled for 6/6/24     This discharge took greater than 35 minutes.    Test Results Pending At Discharge  Pending Labs       No current pending labs.            Hospital Course   Tana Hargrove is a 79 y.o. female with PMHx s/f CAD, HTN, COPD, ESRD on HD via permacath, DM2, MDS, gout, neuropathy presenting for a fall. Pt lives at home and admits she rolled out of bed onto the floor this morning. Denies head injury or LOC. She has been admitted 6 times this year already for multiple issues. Most recently last admission she had a cholecystostomy tube placed for chronic cholecystitis. She also had an ex lap to remove adhesions between her liver and anterior abdominal wall that were encasing her gallbladder. She denies any other real symptoms at this time. No worsening abdominal pain, fever, chills, issues with the cholecystostomy tube, nausea, vomiting, chest pain, or other symptoms. She has been compliant with taking her medications and with her hemodialysis schedule.     ED Course (Summary):   Vitals on presentation: 100.4 F, 98 bpm, 20 rr, 145/53 --> 180/71, 93% on RA  Labs: CMP glu 165, Cr 2.38, alk phos 292, , , total bilirubin 4.7, lipase 146, Trop 62 --> 63  CBC WBC 9.6, Hg 7.5, Plt 210  Imaging: CXR - no acute process. Agree with interpretation.  XR pelvis - no acute osseous abnormality. Agree with interpretation.  CT a/p - Percutaneous drainage tube noted within the maria isabel hepatis. This  appears to be related to a decompressed gallbladder versus positioning  status post cholecystectomy with residual fluid within the gallbladder  fossa. Clinical correlation for operative history recommended. Agree with interpretation.  C. US gallbladder - Sludge and stones within the gallbladder with edematous wall thickening. No biliary dilatation is appreciated.   Interventions: Admitted  to observation for multiple lab abnormalities, mainly LFTs and bilirubin        05/20: Patient was evaluated this morning, awake and alert, denies having chest pain or shortness of breath, no nausea or vomiting.  Liver function is improving  05/21: Patient was evaluated this a.m., denies having fever or chills, no nausea vomiting, no chest pain or shortness of breath.  Evaluated by cardiology for elevated troponin and EKG changes-recommended medical management and signed off.  Liver function gradually downtrending.  05/22: Patient denies having fever or chills, no nausea or vomiting.  Blood culture growing GNB in 1 bottle.     5/23: She's complaining of mild diffuse pruritus.      Later on 5/23: Patient's nurse noted a changed in her mental status and asked me to come evaluate. This AM when I evaluated patient she was awake and alert and cogent. On my arrival she was tired appearing. She was able to stay awake and was engaging in cogent conversation but appeared to be ready to fall asleep at any time. She was hypertensive but has been so throughout the day. BG was normal. She stated she did not want to go for a CTH. We will check a VBG to assess for hypercarbia and a UA to assess for UTI      5/24: Patient is back to her normal mentation. Per her son she did have what sounds like hospital delirium overnight. Initial plan was home with [facility] but she and her family would now like SNF.      5/25: She's much improved today. No further episodes of delirium. Awaiting auth.      5/27: Patient now states she wishes to go home. She is without complaint.     -Update: patient changed her mind and now wishes to go to SNF.       Sepsis with organ dysfunction of elevated bilirubin  -5/20 Bcx with Klebsiella pneumoniae  -5/22 Bcx NGTD   -Continue Augmentin (renally dosed) stop date 6/6  -Percutaneous chidi drain in place. Elective cholecystectomy scheduled for 6/6/24   -ID following     Elevated liver enzymes  US abdomen shows  sludge and stones within the gallbladder, no biliary dilatation.  Hepatitis panel: Negative  Liver function gradually downtrending     ESRD on hemodialysis  -Continue RRT      Elevated troponins:  Patient denies having chest pain, EKG shows minimal ST depression in lateral leads  Cardiology consulted-recommended medical management and signed off.     DM2:  Home meds     Normocytic anemia:  Status post 1 unit of PRBC transfusion on 05/20     HTN:  -increases home amlodipine to 10 mg every day. Switched home metoprolol to carvedilol.      HLD:  Home statin     Physical debility/deconditioning  PT/OT, planning on SNF DC        Pertinent Physical Exam At Time of Discharge  Patient is awake and orient, not in apparent distress  Eyes: PERRLA, no conjunctival congestion  Chest: Bilateral Air entry, no crackles or wheezing  Heart: s1S2 regular, no murmur  Abdomen: Soft, non tender, BS present, cholecystostomy tube in place    Home Medications     Medication List      START taking these medications     amoxicillin-pot clavulanate 500-125 mg tablet; Commonly known as:   Augmentin; Take 1 tablet by mouth 2 times a day. Stop date 6/6/24   carvedilol 12.5 mg tablet; Commonly known as: Coreg; Take 1 tablet (12.5   mg) by mouth 2 times a day.     CHANGE how you take these medications     amLODIPine 10 mg tablet; Commonly known as: Norvasc; Take 1 tablet (10   mg) by mouth once daily.; What changed: medication strength, how much to   take     CONTINUE taking these medications     cholecalciferol 50 MCG (2000 UT) tablet; Commonly known as: Vitamin D-3   cyanocobalamin 1,000 mcg tablet; Commonly known as: Vitamin B-12   pioglitazone 15 mg tablet; Commonly known as: Actos; Take 1 tablet (15   mg) by mouth once daily.   pravastatin 40 mg tablet; Commonly known as: Pravachol; Take 1 tablet   (40 mg) by mouth once daily at bedtime.   pregabalin 100 mg capsule; Commonly known as: Lyrica; TAKE ONE CAPSULE   BY MOUTH TWICE DAILY @9AM &  5PM   SITagliptin phosphate 25 mg tablet; Commonly known as: Januvia; Take 1   tablet (25 mg) by mouth once daily.     STOP taking these medications     allopurinol 100 mg tablet; Commonly known as: Zyloprim   metoprolol tartrate 25 mg tablet; Commonly known as: Lopressor   sevelamer carbonate 800 mg tablet; Commonly known as: Renvela       Outpatient Follow-Up  No follow-ups on file.     Pollo Herbert MD PhD  5/28/2024  7:39 AM

## 2024-05-28 NOTE — PROGRESS NOTES
"      Nephrology Progress Note      Nephrology following for ESRD.   Events over night:     Patient is awake and has no new issues overnight  BP on the high side despite dialysis yesterday    BP (!) 188/69 (BP Location: Right arm, Patient Position: Lying) Comment: RN notified  Pulse 79   Temp 36.9 °C (98.5 °F) (Temporal)   Resp 18   Ht 1.549 m (5' 1\")   Wt 71.9 kg (158 lb 8.2 oz)   SpO2 94%   BMI 29.95 kg/m²     Input / Output:  24 HR:   Intake/Output Summary (Last 24 hours) at 5/28/2024 1027  Last data filed at 5/28/2024 0331  Gross per 24 hour   Intake 800 ml   Output 1494 ml   Net -694 ml       Physical Exam   Alert and oriented x 3 NAD  Neck: no JVD  CV: RRR  Lungs: CTA bilaterally  Abd: soft, NT, ND   Ext: No lower extremity edema    Scheduled medications  amLODIPine, 10 mg, oral, Daily  ascorbic acid, 250 mg, oral, Daily  carvedilol, 12.5 mg, oral, BID  cefTRIAXone, 2 g, intravenous, q24h  heparin (porcine), 5,000 Units, subcutaneous, q8h JOSE DE JESUS  heparin, 2,000 Units, intra-catheter, After Dialysis  heparin, 2,000 Units, intra-catheter, After Dialysis  insulin lispro, 0-5 Units, subcutaneous, TID  metroNIDAZOLE, 500 mg, intravenous, q8h  multivitamin, 1 tablet, oral, Daily  pantoprazole, 40 mg, oral, Daily before breakfast   Or  pantoprazole, 40 mg, intravenous, Daily before breakfast  pravastatin, 40 mg, oral, Nightly  pregabalin, 100 mg, oral, BID  sodium chloride, 10 mL, intra-catheter, BID  zinc sulfate, 50 mg of elemental zinc, oral, Daily      Continuous medications     PRN medications  PRN medications: acetaminophen, bisacodyl, butalbital-acetaminophen-caff, dextrose, dextrose, glucagon, glucagon, guaiFENesin, hydrALAZINE, melatonin, ondansetron **OR** ondansetron   Results from last 7 days   Lab Units 05/23/24  0430   SODIUM mmol/L 138   POTASSIUM mmol/L 3.6   CHLORIDE mmol/L 103   CO2 mmol/L 29   BUN mg/dL 13   CREATININE mg/dL 1.77*   CALCIUM mg/dL 8.9   PROTEIN TOTAL g/dL 5.5*   BILIRUBIN TOTAL " mg/dL 0.6   ALK PHOS U/L 197*   ALT U/L 52*   AST U/L 21   GLUCOSE mg/dL 109*      Results from last 7 days   Lab Units 05/23/24  0430 05/22/24  0502   MAGNESIUM mg/dL 1.66 1.72      Results from last 7 days   Lab Units 05/23/24  0430 05/22/24  0502   WBC AUTO x10*3/uL 3.9* 4.4   HEMOGLOBIN g/dL 7.7* 7.4*   HEMATOCRIT % 24.2* 22.1*   PLATELETS AUTO x10*3/uL 196 195        Assessment & Plan:     Patient is 79 y.o. female history of DM 2, ESRD, hypertension who is admitted to hospital for fall. Nephrology consulted in view of ESRD.     ESRD  -Hemodialysis Monday Wednesday Friday at [facility]  -Has been compliant with dialysis     Hypertension  Accelerated  -Adding losartan 50 mg daily    Hypokalemia     Anemia of ESRD and myelodysplastic syndrome  -She is on MARILYNN with dialysis  -Requirestransfusions despite MARILYNN  -Hemoglobin has been between 8 and 9 as an outpatient in the month of May     CKD-MBD  -Patient currently not on a phosphorus binder     Klebsiella BSI  Has percutaneous cutaneous cholecystotomy drain  -Surgery following  -ID  following  Elevated liver enzymes    HFpEF  -Compensated, does not have high intradialytic weight gains and minimal fluid removal required with dialysis     Recommendations:   -Noted plans for SNF in [place]  -Add losartan 50 mg daily due to accelerated hypertension  -Continue continue HD Monday Wednesday Friday  -Daily Nephrocaps  -Phos goal 3.5-5.5  -Transfuse for hemoglobin less than 7      Please message me through [employer] chat with any questions or concerns.     Pinky Christie DO  5/28/2024  10:27 AM     [facility]    [address], [address]   [place] [address]  Office: [phone]

## 2024-05-28 NOTE — PROGRESS NOTES
05/28/24 1140   Discharge Planning   Does the patient need discharge transport arranged? Yes   Round[employer]rip coordination needed? Yes   Has discharge transport been arranged? Yes   What day is the transport expected? 05/28/24   What time is the transport expected? 1330     Notified RN and patient of transport time of 1:30 PM to [facility] . Answered all questions and verbalized understanding.  Report number is

## 2024-05-28 NOTE — PROGRESS NOTES
Physical Therapy    Physical Therapy Treatment    Patient Name: Tana Hargrove  MRN: [id number]  Today's Date: 5/28/2024  Time Calculation  Start Time: 1104  Stop Time: 1132  Time Calculation (min): 28 min    Assessment/Plan   PT Assessment  PT Assessment Results: Decreased endurance  Rehab Prognosis: Good  End of Session Communication: PCT/NA/CTA  Assessment Comment: pt took a standing rest break during amb. cues to back up completely before sitting and to to reach back for chair arms before sitting. edu on exercises to do on her own.  End of Session Patient Position: Up in chair, Bed, 3 rail up     PT Plan  Treatment/Interventions: Transfer training, Gait training, Strengthening, Balance training, Therapeutic exercise, Home exercise program  PT Plan: Skilled PT  PT Frequency: 3 times per week  PT Discharge Recommendations: Moderate intensity level of continued care  PT - OK to Discharge: Yes      General Visit Information:   PT  Visit  PT Received On: 05/28/24       Subjective   Precautions:     Vital Signs:       Objective   Pain:  Pain Assessment  Pain Score: 0 - No pain  Cognition:  Cognition  Overall Cognitive Status: Within Functional Limits  Coordination:          Treatments:  Therapeutic Exercise  Therapeutic Exercise Performed:  (LAQ, hip flexion x15)    Ambulation/Gait Training 1  Surface 1: Level tile  Device 1: Rolling walker  Assistance 1: Contact guard  Quality of Gait 1: Diminished heel strike, Decreased step length  Comments/Distance (ft) 1: 60x2  Transfer 1  Technique 1: Sit to stand  Transfer Device 1: Walker  Transfer Level of Assistance 1: Contact guard  Trials/Comments 1: chair    Outcome Measures:  [facility] Basic Mobility  Turning from your back to your side while in a flat bed without using bedrails: A little  Moving from lying on your back to sitting on the side of a flat bed without using bedrails: A little  Moving to and from bed to chair (including a wheelchair): A little  Standing up from a  chair using your arms (e.g. wheelchair or bedside chair): A lot  To walk in hospital room: A little  Climbing 3-5 steps with railing: A lot  Basic Mobility - Total Score: 16    Education Documentation  Mobility Training, taught by Mabel Zaman PTA at 5/28/2024 11:53 AM.  Learner: Patient  Readiness: Acceptance  Method: Explanation  Response: Verbalizes Understanding    Education Comments  No comments found.        OP EDUCATION:       Encounter Problems       Encounter Problems (Active)       PT Problem       PT Goal 1 (Progressing)       Start:  05/25/24    Expected End:  06/08/24       Increase DIGNA knee strength to 5/5 to ease capability to perform ADLs         PT Goal 2 (Progressing)       Start:  05/25/24    Expected End:  06/08/24       The patient will be able to perform all transfers with FWW independently          PT Goal 3 (Progressing)       Start:  05/25/24    Expected End:  06/08/24       The patient will be able to ambulate 100 feet with FWW for household ADLs            Pain - Adult

## 2024-05-28 NOTE — NURSING NOTE
Attempted to call report the nurse was busy and stated she would call back for report.  Phone number was left.  Will attempt later if no phone call in the next 30 minutes

## 2024-05-30 ENCOUNTER — ANESTHESIA EVENT (OUTPATIENT)
Dept: OPERATING ROOM | Facility: HOSPITAL | Age: 79
End: 2024-05-30
Payer: MEDICARE

## 2024-05-30 ENCOUNTER — DOCUMENTATION (OUTPATIENT)
Dept: CARE COORDINATION | Facility: CLINIC | Age: 79
End: 2024-05-30
Payer: MEDICARE

## 2024-05-30 NOTE — PROGRESS NOTES
Patient readmitted 5/20-5/28,  [facility], discharged to [facility]. Will close CM program.

## 2024-06-03 ENCOUNTER — APPOINTMENT (OUTPATIENT)
Dept: HEMATOLOGY/ONCOLOGY | Facility: CLINIC | Age: 79
End: 2024-06-03
Payer: MEDICARE

## 2024-06-03 ENCOUNTER — TELEPHONE (OUTPATIENT)
Dept: SURGERY | Facility: CLINIC | Age: 79
End: 2024-06-03
Payer: MEDICARE

## 2024-06-03 NOTE — TELEPHONE ENCOUNTER
Patient called and states that she got a message the she needs to schedule a cholangiogram. Patient states that she is scheduled for surgery on 6/6/24. Patient states that her drain is draining fine and it is too much of a chore to get back to [place]. Please advise.

## 2024-06-05 NOTE — H&P
History Of Present Illness  Tana Hargrove is a 79 y.o. female presenting with chronic cholecystitis managed with a percutaneous cholecystostomy tube here for definitive removal of her GB and drain.     Past Medical History  Past Medical History:   Diagnosis Date    Abnormal levels of other serum enzymes     Elevated liver enzymes    Acute kidney failure (CMS-HCC)     Anemia     CKD (chronic kidney disease)     COPD (chronic obstructive pulmonary disease) (Multi)     Coronary artery disease     Disease of blood and blood-forming organs, unspecified     Bone marrow disorder    HLD (hyperlipidemia)     Hypertension     MDS (myelodysplastic syndrome) (Multi)     Personal history of other diseases of the musculoskeletal system and connective tissue     History of muscle pain    Personal history of other specified conditions     History of insomnia    Personal history of other specified conditions     History of edema    Type 2 diabetes mellitus (Multi)        Surgical History  Past Surgical History:   Procedure Laterality Date    APPENDECTOMY      BREAST BIOPSY Left     left excisional    BREAST LUMPECTOMY      COLONOSCOPY      HYSTERECTOMY      JOINT REPLACEMENT      MR HEAD ANGIO WO IV CONTRAST  11/20/2020    MR HEAD ANGIO WO IV CONTRAST 11/20/2020 [facility]ICAL [facility]GAC[facility]    MR NECK ANGIO WO IV CONTRAST  11/20/2020    MR NECK ANGIO WO IV CONTRAST 11/20/2020 [facility] CLINICAL LEGAC[facility]    OOPHORECTOMY      OTHER SURGICAL HISTORY  12/13/2019    Oophorectomy bilateral    OTHER SURGICAL HISTORY  12/13/2019    Tubal ligation    OTHER SURGICAL HISTORY Bilateral 12/13/2019    Knee replacement    OTHER SURGICAL HISTORY Left 12/13/2019    Shoulder surgery    OTHER SURGICAL HISTORY  12/13/2019    Hysterectomy    OTHER SURGICAL HISTORY  12/13/2019    Lumpectomy    OTHER SURGICAL HISTORY Right 12/13/2019    Foot surgery    OTHER SURGICAL HISTORY  04/16/2021    Back surgery        Social History  She reports that she has never smoked. She  has never used smokeless tobacco. She reports that she does not currently use alcohol. She reports that she does not use drugs.    Family History  No family history on file.     Allergies  Codeine, Hydrocodone, Hydrocodone-acetaminophen, Meperidine (pf), Tramadol, Piperacillin-tazobactam, Adhesive tape-silicones, and Meperidine    Review of Systems  Constitutional: Denies changes in weight, fatigue, night-sweats  HEENT: No changes in vision, nasal drainage, headache  CV: No palpitations, no chest pain, no lower extremity oedema  Resp: No wheezing, no cough, no shortness of breath  GI: No nausea, vomiting, diarrhoea, constipation  : No dysuria, no increased frequency  Neuro: No weakness, confusion, numbness, dizziness  MSK: No weakness, arthralgias, myalgias  Haem: No easy bruising, no easy bleeding  Skin: No new lesions or rashes  Endocrine: No polydipsia, polyuria, heat/cold insensitivity    /65   Pulse 77   Temp 36.8 °C (98.2 °F) (Temporal)   Resp 12   SpO2 97%   Physical Exam  Vitals reviewed.   Cardiovascular:      Rate and Rhythm: Normal rate.   Pulmonary:      Effort: Pulmonary effort is normal.   Abdominal:      Palpations: Abdomen is soft.      Comments: Drain exiting RUQ with minimal output  NT, ND   Skin:     General: Skin is warm.      Capillary Refill: Capillary refill takes less than 2 seconds.   Neurological:      General: No focal deficit present.      Mental Status: She is alert.   Psychiatric:         Mood and Affect: Mood normal.        Assessment/Plan   Principal Problem:    Chronic cholecystitis  - consent for lap poss open cholecystectomy  - NPO/IVF/IV abx    Ronald Grimes MD

## 2024-06-06 ENCOUNTER — APPOINTMENT (OUTPATIENT)
Dept: CARDIOLOGY | Facility: HOSPITAL | Age: 79
DRG: 417 | End: 2024-06-06
Payer: MEDICARE

## 2024-06-06 ENCOUNTER — ANESTHESIA (OUTPATIENT)
Dept: OPERATING ROOM | Facility: HOSPITAL | Age: 79
End: 2024-06-06
Payer: MEDICARE

## 2024-06-06 ENCOUNTER — HOSPITAL ENCOUNTER (INPATIENT)
Facility: HOSPITAL | Age: 79
DRG: 417 | End: 2024-06-06
Attending: SURGERY | Admitting: SURGERY
Payer: MEDICARE

## 2024-06-06 DIAGNOSIS — K81.1 CHRONIC CHOLECYSTITIS: Primary | ICD-10-CM

## 2024-06-06 DIAGNOSIS — I48.91 ATRIAL FIBRILLATION WITH RAPID VENTRICULAR RESPONSE (MULTI): ICD-10-CM

## 2024-06-06 LAB
ABO GROUP (TYPE) IN BLOOD: NORMAL
ALBUMIN SERPL BCP-MCNC: 3.9 G/DL (ref 3.4–5)
ALP SERPL-CCNC: 152 U/L (ref 33–136)
ALT SERPL W P-5'-P-CCNC: 29 U/L (ref 7–45)
ANION GAP SERPL CALC-SCNC: 12 MMOL/L (ref 10–20)
ANION GAP SERPL CALC-SCNC: 14 MMOL/L (ref 10–20)
ANTIBODY SCREEN: NORMAL
APTT PPP: 31 SECONDS (ref 27–38)
AST SERPL W P-5'-P-CCNC: 20 U/L (ref 9–39)
BASOPHILS # BLD AUTO: 0.03 X10*3/UL (ref 0–0.1)
BASOPHILS NFR BLD AUTO: 0.3 %
BILIRUB DIRECT SERPL-MCNC: 0.1 MG/DL (ref 0–0.3)
BILIRUB SERPL-MCNC: 0.7 MG/DL (ref 0–1.2)
BUN SERPL-MCNC: 30 MG/DL (ref 6–23)
BUN SERPL-MCNC: 33 MG/DL (ref 6–23)
CALCIUM SERPL-MCNC: 8.3 MG/DL (ref 8.6–10.3)
CALCIUM SERPL-MCNC: 9.8 MG/DL (ref 8.6–10.3)
CARDIAC TROPONIN I PNL SERPL HS: 9 NG/L (ref 0–13)
CHLORIDE SERPL-SCNC: 102 MMOL/L (ref 98–107)
CHLORIDE SERPL-SCNC: 106 MMOL/L (ref 98–107)
CO2 SERPL-SCNC: 23 MMOL/L (ref 21–32)
CO2 SERPL-SCNC: 28 MMOL/L (ref 21–32)
CREAT SERPL-MCNC: 2.1 MG/DL (ref 0.5–1.05)
CREAT SERPL-MCNC: 2.17 MG/DL (ref 0.5–1.05)
EGFRCR SERPLBLD CKD-EPI 2021: 23 ML/MIN/1.73M*2
EGFRCR SERPLBLD CKD-EPI 2021: 23 ML/MIN/1.73M*2
EOSINOPHIL # BLD AUTO: 0.04 X10*3/UL (ref 0–0.4)
EOSINOPHIL NFR BLD AUTO: 0.4 %
ERYTHROCYTE [DISTWIDTH] IN BLOOD BY AUTOMATED COUNT: 22.6 % (ref 11.5–14.5)
ERYTHROCYTE [DISTWIDTH] IN BLOOD BY AUTOMATED COUNT: 22.9 % (ref 11.5–14.5)
GLUCOSE BLD MANUAL STRIP-MCNC: 197 MG/DL (ref 74–99)
GLUCOSE BLD MANUAL STRIP-MCNC: 213 MG/DL (ref 74–99)
GLUCOSE BLD MANUAL STRIP-MCNC: 220 MG/DL (ref 74–99)
GLUCOSE SERPL-MCNC: 153 MG/DL (ref 74–99)
GLUCOSE SERPL-MCNC: 226 MG/DL (ref 74–99)
HCT VFR BLD AUTO: 25.8 % (ref 36–46)
HCT VFR BLD AUTO: 25.9 % (ref 36–46)
HGB BLD-MCNC: 8.3 G/DL (ref 12–16)
HGB BLD-MCNC: 8.4 G/DL (ref 12–16)
HOLD SPECIMEN: NORMAL
HOLD SPECIMEN: NORMAL
IMM GRANULOCYTES # BLD AUTO: 0.04 X10*3/UL (ref 0–0.5)
IMM GRANULOCYTES NFR BLD AUTO: 0.4 % (ref 0–0.9)
INR PPP: 1.1 (ref 0.9–1.1)
LYMPHOCYTES # BLD AUTO: 1.07 X10*3/UL (ref 0.8–3)
LYMPHOCYTES NFR BLD AUTO: 11.2 %
MAGNESIUM SERPL-MCNC: 1.49 MG/DL (ref 1.6–2.4)
MAGNESIUM SERPL-MCNC: 1.67 MG/DL (ref 1.6–2.4)
MCH RBC QN AUTO: 29.7 PG (ref 26–34)
MCH RBC QN AUTO: 29.9 PG (ref 26–34)
MCHC RBC AUTO-ENTMCNC: 32.2 G/DL (ref 32–36)
MCHC RBC AUTO-ENTMCNC: 32.4 G/DL (ref 32–36)
MCV RBC AUTO: 92 FL (ref 80–100)
MCV RBC AUTO: 93 FL (ref 80–100)
MONOCYTES # BLD AUTO: 0.74 X10*3/UL (ref 0.05–0.8)
MONOCYTES NFR BLD AUTO: 7.8 %
NEUTROPHILS # BLD AUTO: 7.6 X10*3/UL (ref 1.6–5.5)
NEUTROPHILS NFR BLD AUTO: 79.9 %
NRBC BLD-RTO: 0 /100 WBCS (ref 0–0)
NRBC BLD-RTO: 0 /100 WBCS (ref 0–0)
OVALOCYTES BLD QL SMEAR: NORMAL
PHOSPHATE SERPL-MCNC: 4.4 MG/DL (ref 2.5–4.9)
PHOSPHATE SERPL-MCNC: 4.8 MG/DL (ref 2.5–4.9)
PLATELET # BLD AUTO: 259 X10*3/UL (ref 150–450)
PLATELET # BLD AUTO: 265 X10*3/UL (ref 150–450)
POTASSIUM SERPL-SCNC: 4.5 MMOL/L (ref 3.5–5.3)
POTASSIUM SERPL-SCNC: 5.9 MMOL/L (ref 3.5–5.3)
PROT SERPL-MCNC: 6.6 G/DL (ref 6.4–8.2)
PROTHROMBIN TIME: 11.9 SECONDS (ref 9.8–12.8)
RBC # BLD AUTO: 2.78 X10*6/UL (ref 4–5.2)
RBC # BLD AUTO: 2.83 X10*6/UL (ref 4–5.2)
RBC MORPH BLD: NORMAL
RH FACTOR (ANTIGEN D): NORMAL
SODIUM SERPL-SCNC: 137 MMOL/L (ref 136–145)
SODIUM SERPL-SCNC: 137 MMOL/L (ref 136–145)
WBC # BLD AUTO: 5.2 X10*3/UL (ref 4.4–11.3)
WBC # BLD AUTO: 9.5 X10*3/UL (ref 4.4–11.3)

## 2024-06-06 PROCEDURE — 2020000001 HC ICU ROOM DAILY

## 2024-06-06 PROCEDURE — 0FB44ZZ EXCISION OF GALLBLADDER, PERCUTANEOUS ENDOSCOPIC APPROACH: ICD-10-PCS | Performed by: SURGERY

## 2024-06-06 PROCEDURE — 3700000002 HC GENERAL ANESTHESIA TIME - EACH INCREMENTAL 1 MINUTE: Performed by: SURGERY

## 2024-06-06 PROCEDURE — 83735 ASSAY OF MAGNESIUM: CPT | Performed by: SURGERY

## 2024-06-06 PROCEDURE — 88304 TISSUE EXAM BY PATHOLOGIST: CPT | Mod: TC,PARLAB,PORLAB | Performed by: SURGERY

## 2024-06-06 PROCEDURE — 85610 PROTHROMBIN TIME: CPT | Performed by: SURGERY

## 2024-06-06 PROCEDURE — 47562 LAPAROSCOPIC CHOLECYSTECTOMY: CPT | Performed by: SURGERY

## 2024-06-06 PROCEDURE — 2500000001 HC RX 250 WO HCPCS SELF ADMINISTERED DRUGS (ALT 637 FOR MEDICARE OP): Performed by: SURGERY

## 2024-06-06 PROCEDURE — 36415 COLL VENOUS BLD VENIPUNCTURE: CPT | Performed by: SURGERY

## 2024-06-06 PROCEDURE — 86901 BLOOD TYPING SEROLOGIC RH(D): CPT | Performed by: SURGERY

## 2024-06-06 PROCEDURE — 84100 ASSAY OF PHOSPHORUS: CPT | Performed by: SURGERY

## 2024-06-06 PROCEDURE — 0DNU4ZZ RELEASE OMENTUM, PERCUTANEOUS ENDOSCOPIC APPROACH: ICD-10-PCS | Performed by: SURGERY

## 2024-06-06 PROCEDURE — 86922 COMPATIBILITY TEST ANTIGLOB: CPT | Mod: 91

## 2024-06-06 PROCEDURE — 2500000005 HC RX 250 GENERAL PHARMACY W/O HCPCS: Performed by: SURGERY

## 2024-06-06 PROCEDURE — 2500000005 HC RX 250 GENERAL PHARMACY W/O HCPCS: Performed by: NURSE ANESTHETIST, CERTIFIED REGISTERED

## 2024-06-06 PROCEDURE — 2500000004 HC RX 250 GENERAL PHARMACY W/ HCPCS (ALT 636 FOR OP/ED): Mod: JZ | Performed by: SURGERY

## 2024-06-06 PROCEDURE — 2780000003 HC OR 278 NO HCPCS: Performed by: SURGERY

## 2024-06-06 PROCEDURE — 7100000002 HC RECOVERY ROOM TIME - EACH INCREMENTAL 1 MINUTE: Performed by: SURGERY

## 2024-06-06 PROCEDURE — 3600000008 HC OR TIME - EACH INCREMENTAL 1 MINUTE - PROCEDURE LEVEL THREE: Performed by: SURGERY

## 2024-06-06 PROCEDURE — 84075 ASSAY ALKALINE PHOSPHATASE: CPT | Performed by: SURGERY

## 2024-06-06 PROCEDURE — 2720000007 HC OR 272 NO HCPCS: Performed by: SURGERY

## 2024-06-06 PROCEDURE — 0DNW4ZZ RELEASE PERITONEUM, PERCUTANEOUS ENDOSCOPIC APPROACH: ICD-10-PCS | Performed by: SURGERY

## 2024-06-06 PROCEDURE — 2500000004 HC RX 250 GENERAL PHARMACY W/ HCPCS (ALT 636 FOR OP/ED): Performed by: SURGERY

## 2024-06-06 PROCEDURE — 3600000003 HC OR TIME - INITIAL BASE CHARGE - PROCEDURE LEVEL THREE: Performed by: SURGERY

## 2024-06-06 PROCEDURE — 2500000004 HC RX 250 GENERAL PHARMACY W/ HCPCS (ALT 636 FOR OP/ED): Performed by: NURSE ANESTHETIST, CERTIFIED REGISTERED

## 2024-06-06 PROCEDURE — 85025 COMPLETE CBC W/AUTO DIFF WBC: CPT | Performed by: SURGERY

## 2024-06-06 PROCEDURE — 93005 ELECTROCARDIOGRAM TRACING: CPT

## 2024-06-06 PROCEDURE — 2500000004 HC RX 250 GENERAL PHARMACY W/ HCPCS (ALT 636 FOR OP/ED): Performed by: ANESTHESIOLOGY

## 2024-06-06 PROCEDURE — 84484 ASSAY OF TROPONIN QUANT: CPT | Performed by: SURGERY

## 2024-06-06 PROCEDURE — 2500000002 HC RX 250 W HCPCS SELF ADMINISTERED DRUGS (ALT 637 FOR MEDICARE OP, ALT 636 FOR OP/ED): Performed by: SURGERY

## 2024-06-06 PROCEDURE — 3700000001 HC GENERAL ANESTHESIA TIME - INITIAL BASE CHARGE: Performed by: SURGERY

## 2024-06-06 PROCEDURE — 85027 COMPLETE CBC AUTOMATED: CPT | Performed by: SURGERY

## 2024-06-06 PROCEDURE — 7100000001 HC RECOVERY ROOM TIME - INITIAL BASE CHARGE: Performed by: SURGERY

## 2024-06-06 PROCEDURE — 82947 ASSAY GLUCOSE BLOOD QUANT: CPT

## 2024-06-06 PROCEDURE — 85730 THROMBOPLASTIN TIME PARTIAL: CPT | Performed by: SURGERY

## 2024-06-06 PROCEDURE — 80053 COMPREHEN METABOLIC PANEL: CPT | Performed by: SURGERY

## 2024-06-06 PROCEDURE — 93010 ELECTROCARDIOGRAM REPORT: CPT | Performed by: INTERNAL MEDICINE

## 2024-06-06 RX ORDER — METRONIDAZOLE 500 MG/100ML
500 INJECTION, SOLUTION INTRAVENOUS EVERY 8 HOURS
Status: COMPLETED | OUTPATIENT
Start: 2024-06-06 | End: 2024-06-10

## 2024-06-06 RX ORDER — CARVEDILOL 25 MG/1
12.5 TABLET ORAL 2 TIMES DAILY
Status: CANCELLED | OUTPATIENT
Start: 2024-06-06

## 2024-06-06 RX ORDER — PHENYLEPHRINE HYDROCHLORIDE 10 MG/ML
INJECTION INTRAVENOUS AS NEEDED
Status: DISCONTINUED | OUTPATIENT
Start: 2024-06-06 | End: 2024-06-06

## 2024-06-06 RX ORDER — LANOLIN ALCOHOL/MO/W.PET/CERES
1000 CREAM (GRAM) TOPICAL DAILY
Status: DISCONTINUED | OUTPATIENT
Start: 2024-06-06 | End: 2024-06-13 | Stop reason: HOSPADM

## 2024-06-06 RX ORDER — HEPARIN SODIUM 5000 [USP'U]/ML
5000 INJECTION, SOLUTION INTRAVENOUS; SUBCUTANEOUS EVERY 8 HOURS
Status: DISCONTINUED | OUTPATIENT
Start: 2024-06-06 | End: 2024-06-09

## 2024-06-06 RX ORDER — INSULIN LISPRO 100 [IU]/ML
0-5 INJECTION, SOLUTION INTRAVENOUS; SUBCUTANEOUS EVERY 4 HOURS
Status: DISCONTINUED | OUTPATIENT
Start: 2024-06-06 | End: 2024-06-07

## 2024-06-06 RX ORDER — LIDOCAINE HCL/PF 100 MG/5ML
SYRINGE (ML) INTRAVENOUS AS NEEDED
Status: DISCONTINUED | OUTPATIENT
Start: 2024-06-06 | End: 2024-06-06

## 2024-06-06 RX ORDER — BUPIVACAINE HYDROCHLORIDE AND EPINEPHRINE 2.5; 5 MG/ML; UG/ML
INJECTION, SOLUTION EPIDURAL; INFILTRATION; INTRACAUDAL; PERINEURAL AS NEEDED
Status: DISCONTINUED | OUTPATIENT
Start: 2024-06-06 | End: 2024-06-06 | Stop reason: HOSPADM

## 2024-06-06 RX ORDER — ROCURONIUM BROMIDE 50 MG/5 ML
SYRINGE (ML) INTRAVENOUS AS NEEDED
Status: DISCONTINUED | OUTPATIENT
Start: 2024-06-06 | End: 2024-06-06

## 2024-06-06 RX ORDER — ONDANSETRON HYDROCHLORIDE 2 MG/ML
4 INJECTION, SOLUTION INTRAVENOUS EVERY 6 HOURS PRN
Status: DISCONTINUED | OUTPATIENT
Start: 2024-06-06 | End: 2024-06-13 | Stop reason: HOSPADM

## 2024-06-06 RX ORDER — ALBUTEROL SULFATE 0.83 MG/ML
2.5 SOLUTION RESPIRATORY (INHALATION) ONCE AS NEEDED
Status: DISCONTINUED | OUTPATIENT
Start: 2024-06-06 | End: 2024-06-06 | Stop reason: HOSPADM

## 2024-06-06 RX ORDER — METOPROLOL TARTRATE 1 MG/ML
5 INJECTION, SOLUTION INTRAVENOUS EVERY 4 HOURS PRN
Status: CANCELLED | OUTPATIENT
Start: 2024-06-06

## 2024-06-06 RX ORDER — PROPOFOL 10 MG/ML
INJECTION, EMULSION INTRAVENOUS AS NEEDED
Status: DISCONTINUED | OUTPATIENT
Start: 2024-06-06 | End: 2024-06-06

## 2024-06-06 RX ORDER — FENTANYL CITRATE 50 UG/ML
INJECTION, SOLUTION INTRAMUSCULAR; INTRAVENOUS AS NEEDED
Status: DISCONTINUED | OUTPATIENT
Start: 2024-06-06 | End: 2024-06-06

## 2024-06-06 RX ORDER — MORPHINE SULFATE 2 MG/ML
2 INJECTION, SOLUTION INTRAMUSCULAR; INTRAVENOUS EVERY 5 MIN PRN
Status: DISCONTINUED | OUTPATIENT
Start: 2024-06-06 | End: 2024-06-06 | Stop reason: HOSPADM

## 2024-06-06 RX ORDER — CARVEDILOL 12.5 MG/1
12.5 TABLET ORAL 2 TIMES DAILY
Status: DISCONTINUED | OUTPATIENT
Start: 2024-06-06 | End: 2024-06-09

## 2024-06-06 RX ORDER — PREGABALIN 25 MG/1
100 CAPSULE ORAL 2 TIMES DAILY
Status: CANCELLED | OUTPATIENT
Start: 2024-06-06

## 2024-06-06 RX ORDER — DROPERIDOL 2.5 MG/ML
0.62 INJECTION, SOLUTION INTRAMUSCULAR; INTRAVENOUS ONCE AS NEEDED
Status: DISCONTINUED | OUTPATIENT
Start: 2024-06-06 | End: 2024-06-06 | Stop reason: HOSPADM

## 2024-06-06 RX ORDER — MEPERIDINE HYDROCHLORIDE 25 MG/ML
12.5 INJECTION INTRAMUSCULAR; INTRAVENOUS; SUBCUTANEOUS EVERY 10 MIN PRN
Status: DISCONTINUED | OUTPATIENT
Start: 2024-06-06 | End: 2024-06-06

## 2024-06-06 RX ORDER — SODIUM CHLORIDE 9 MG/ML
50 INJECTION, SOLUTION INTRAVENOUS CONTINUOUS
Status: DISCONTINUED | OUTPATIENT
Start: 2024-06-06 | End: 2024-06-06

## 2024-06-06 RX ORDER — LIDOCAINE HYDROCHLORIDE 10 MG/ML
0.1 INJECTION, SOLUTION EPIDURAL; INFILTRATION; INTRACAUDAL; PERINEURAL ONCE
Status: DISCONTINUED | OUTPATIENT
Start: 2024-06-06 | End: 2024-06-06 | Stop reason: HOSPADM

## 2024-06-06 RX ORDER — PIOGLITAZONEHYDROCHLORIDE 15 MG/1
15 TABLET ORAL DAILY
Status: CANCELLED | OUTPATIENT
Start: 2024-06-06

## 2024-06-06 RX ORDER — LANOLIN ALCOHOL/MO/W.PET/CERES
1000 CREAM (GRAM) TOPICAL DAILY
Status: CANCELLED | OUTPATIENT
Start: 2024-06-06

## 2024-06-06 RX ORDER — LOSARTAN POTASSIUM 50 MG/1
50 TABLET ORAL DAILY
Status: DISCONTINUED | OUTPATIENT
Start: 2024-06-06 | End: 2024-06-13 | Stop reason: HOSPADM

## 2024-06-06 RX ORDER — LIDOCAINE HYDROCHLORIDE 20 MG/ML
INJECTION, SOLUTION EPIDURAL; INFILTRATION; INTRACAUDAL; PERINEURAL AS NEEDED
Status: DISCONTINUED | OUTPATIENT
Start: 2024-06-06 | End: 2024-06-06

## 2024-06-06 RX ORDER — PRAVASTATIN SODIUM 40 MG/1
40 TABLET ORAL NIGHTLY
Status: DISCONTINUED | OUTPATIENT
Start: 2024-06-06 | End: 2024-06-13 | Stop reason: HOSPADM

## 2024-06-06 RX ORDER — CIPROFLOXACIN 2 MG/ML
400 INJECTION, SOLUTION INTRAVENOUS ONCE
Status: COMPLETED | OUTPATIENT
Start: 2024-06-06 | End: 2024-06-06

## 2024-06-06 RX ORDER — CHOLECALCIFEROL (VITAMIN D3) 25 MCG
2000 TABLET ORAL DAILY
Status: CANCELLED | OUTPATIENT
Start: 2024-06-06

## 2024-06-06 RX ORDER — ONDANSETRON HYDROCHLORIDE 2 MG/ML
4 INJECTION, SOLUTION INTRAVENOUS ONCE AS NEEDED
Status: DISCONTINUED | OUTPATIENT
Start: 2024-06-06 | End: 2024-06-06 | Stop reason: HOSPADM

## 2024-06-06 RX ORDER — HYDROMORPHONE HYDROCHLORIDE 0.2 MG/ML
0.2 INJECTION INTRAMUSCULAR; INTRAVENOUS; SUBCUTANEOUS EVERY 4 HOURS PRN
Status: DISCONTINUED | OUTPATIENT
Start: 2024-06-06 | End: 2024-06-13 | Stop reason: HOSPADM

## 2024-06-06 RX ORDER — LOSARTAN POTASSIUM 50 MG/1
50 TABLET ORAL DAILY
Status: CANCELLED | OUTPATIENT
Start: 2024-06-06

## 2024-06-06 RX ORDER — PRAVASTATIN SODIUM 40 MG/1
40 TABLET ORAL NIGHTLY
Status: CANCELLED | OUTPATIENT
Start: 2024-06-06

## 2024-06-06 RX ORDER — DIPHENHYDRAMINE HYDROCHLORIDE 50 MG/ML
12.5 INJECTION INTRAMUSCULAR; INTRAVENOUS ONCE AS NEEDED
Status: DISCONTINUED | OUTPATIENT
Start: 2024-06-06 | End: 2024-06-06 | Stop reason: HOSPADM

## 2024-06-06 RX ORDER — DEXTROSE 50 % IN WATER (D50W) INTRAVENOUS SYRINGE
25
Status: CANCELLED | OUTPATIENT
Start: 2024-06-06

## 2024-06-06 RX ORDER — METRONIDAZOLE 500 MG/100ML
500 INJECTION, SOLUTION INTRAVENOUS ONCE
Status: COMPLETED | OUTPATIENT
Start: 2024-06-06 | End: 2024-06-06

## 2024-06-06 RX ORDER — METOPROLOL TARTRATE 1 MG/ML
5 INJECTION, SOLUTION INTRAVENOUS EVERY 6 HOURS
Status: CANCELLED | OUTPATIENT
Start: 2024-06-06

## 2024-06-06 RX ORDER — HYDRALAZINE HYDROCHLORIDE 20 MG/ML
5 INJECTION INTRAMUSCULAR; INTRAVENOUS EVERY 30 MIN PRN
Status: DISCONTINUED | OUTPATIENT
Start: 2024-06-06 | End: 2024-06-06 | Stop reason: HOSPADM

## 2024-06-06 RX ORDER — OXYCODONE HYDROCHLORIDE 10 MG/1
10 TABLET ORAL EVERY 6 HOURS PRN
Status: DISCONTINUED | OUTPATIENT
Start: 2024-06-06 | End: 2024-06-07

## 2024-06-06 RX ORDER — DEXTROSE 50 % IN WATER (D50W) INTRAVENOUS SYRINGE
12.5
Status: DISCONTINUED | OUTPATIENT
Start: 2024-06-06 | End: 2024-06-07

## 2024-06-06 RX ORDER — LABETALOL HYDROCHLORIDE 5 MG/ML
5 INJECTION, SOLUTION INTRAVENOUS ONCE AS NEEDED
Status: DISCONTINUED | OUTPATIENT
Start: 2024-06-06 | End: 2024-06-06 | Stop reason: HOSPADM

## 2024-06-06 RX ORDER — AMLODIPINE BESYLATE 10 MG/1
10 TABLET ORAL DAILY
Status: CANCELLED | OUTPATIENT
Start: 2024-06-06

## 2024-06-06 RX ORDER — INSULIN LISPRO 100 [IU]/ML
0-10 INJECTION, SOLUTION INTRAVENOUS; SUBCUTANEOUS EVERY 4 HOURS
Status: CANCELLED | OUTPATIENT
Start: 2024-06-06

## 2024-06-06 RX ORDER — ONDANSETRON HYDROCHLORIDE 2 MG/ML
INJECTION, SOLUTION INTRAVENOUS AS NEEDED
Status: DISCONTINUED | OUTPATIENT
Start: 2024-06-06 | End: 2024-06-06

## 2024-06-06 RX ORDER — PANTOPRAZOLE SODIUM 40 MG/10ML
40 INJECTION, POWDER, LYOPHILIZED, FOR SOLUTION INTRAVENOUS DAILY
Status: CANCELLED | OUTPATIENT
Start: 2024-06-06

## 2024-06-06 RX ORDER — BUPIVACAINE HYDROCHLORIDE 2.5 MG/ML
INJECTION, SOLUTION EPIDURAL; INFILTRATION; INTRACAUDAL AS NEEDED
Status: DISCONTINUED | OUTPATIENT
Start: 2024-06-06 | End: 2024-06-06

## 2024-06-06 RX ORDER — SODIUM CHLORIDE, SODIUM LACTATE, POTASSIUM CHLORIDE, CALCIUM CHLORIDE 600; 310; 30; 20 MG/100ML; MG/100ML; MG/100ML; MG/100ML
100 INJECTION, SOLUTION INTRAVENOUS CONTINUOUS
Status: DISCONTINUED | OUTPATIENT
Start: 2024-06-06 | End: 2024-06-06 | Stop reason: HOSPADM

## 2024-06-06 RX ORDER — PREGABALIN 50 MG/1
100 CAPSULE ORAL 2 TIMES DAILY
Status: DISCONTINUED | OUTPATIENT
Start: 2024-06-06 | End: 2024-06-13 | Stop reason: HOSPADM

## 2024-06-06 RX ORDER — AMLODIPINE BESYLATE 10 MG/1
10 TABLET ORAL DAILY
Status: DISCONTINUED | OUTPATIENT
Start: 2024-06-06 | End: 2024-06-08

## 2024-06-06 RX ORDER — CIPROFLOXACIN 2 MG/ML
400 INJECTION, SOLUTION INTRAVENOUS DAILY
Status: COMPLETED | OUTPATIENT
Start: 2024-06-07 | End: 2024-06-10

## 2024-06-06 RX ORDER — OXYCODONE HYDROCHLORIDE 5 MG/1
5 TABLET ORAL EVERY 6 HOURS PRN
Status: DISCONTINUED | OUTPATIENT
Start: 2024-06-06 | End: 2024-06-07

## 2024-06-06 RX ORDER — DEXTROSE 50 % IN WATER (D50W) INTRAVENOUS SYRINGE
25
Status: DISCONTINUED | OUTPATIENT
Start: 2024-06-06 | End: 2024-06-07

## 2024-06-06 RX ORDER — CHOLECALCIFEROL (VITAMIN D3) 25 MCG
2000 TABLET ORAL DAILY
Status: DISCONTINUED | OUTPATIENT
Start: 2024-06-06 | End: 2024-06-13 | Stop reason: HOSPADM

## 2024-06-06 RX ORDER — FAMOTIDINE 10 MG/ML
20 INJECTION INTRAVENOUS ONCE
Status: COMPLETED | OUTPATIENT
Start: 2024-06-06 | End: 2024-06-06

## 2024-06-06 RX ORDER — DEXTROSE 50 % IN WATER (D50W) INTRAVENOUS SYRINGE
12.5
Status: CANCELLED | OUTPATIENT
Start: 2024-06-06

## 2024-06-06 RX ADMIN — AMLODIPINE BESYLATE 10 MG: 10 TABLET ORAL at 18:54

## 2024-06-06 RX ADMIN — CARVEDILOL 12.5 MG: 12.5 TABLET, FILM COATED ORAL at 20:10

## 2024-06-06 RX ADMIN — PREGABALIN 100 MG: 50 CAPSULE ORAL at 20:10

## 2024-06-06 RX ADMIN — PROPOFOL 100 MG: 10 INJECTION, EMULSION INTRAVENOUS at 07:48

## 2024-06-06 RX ADMIN — CIPROFLOXACIN 400 MG: 400 INJECTION, SOLUTION INTRAVENOUS at 07:36

## 2024-06-06 RX ADMIN — LOSARTAN POTASSIUM 50 MG: 50 TABLET, FILM COATED ORAL at 18:54

## 2024-06-06 RX ADMIN — Medication 40 MG: at 07:48

## 2024-06-06 RX ADMIN — Medication 40 PERCENT: at 20:00

## 2024-06-06 RX ADMIN — PHENYLEPHRINE HYDROCHLORIDE 100 MCG: 10 INJECTION INTRAVENOUS at 09:06

## 2024-06-06 RX ADMIN — PHENYLEPHRINE HYDROCHLORIDE 100 MCG: 10 INJECTION INTRAVENOUS at 13:05

## 2024-06-06 RX ADMIN — PHENYLEPHRINE HYDROCHLORIDE 100 MCG: 10 INJECTION INTRAVENOUS at 11:10

## 2024-06-06 RX ADMIN — PHENYLEPHRINE HYDROCHLORIDE 100 MCG: 10 INJECTION INTRAVENOUS at 09:20

## 2024-06-06 RX ADMIN — BUPIVACAINE HYDROCHLORIDE 20 ML: 2.5 INJECTION, SOLUTION EPIDURAL; INFILTRATION; INTRACAUDAL; PERINEURAL at 13:50

## 2024-06-06 RX ADMIN — DEXAMETHASONE SODIUM PHOSPHATE 4 MG: 4 INJECTION, SOLUTION INTRAMUSCULAR; INTRAVENOUS at 13:50

## 2024-06-06 RX ADMIN — Medication 5 L/MIN: at 16:45

## 2024-06-06 RX ADMIN — SUGAMMADEX 100 MG: 100 INJECTION, SOLUTION INTRAVENOUS at 14:05

## 2024-06-06 RX ADMIN — PHENYLEPHRINE HYDROCHLORIDE 100 MCG: 10 INJECTION INTRAVENOUS at 10:29

## 2024-06-06 RX ADMIN — PHENYLEPHRINE HYDROCHLORIDE 100 MCG: 10 INJECTION INTRAVENOUS at 13:14

## 2024-06-06 RX ADMIN — METRONIDAZOLE 500 MG: 500 INJECTION, SOLUTION INTRAVENOUS at 15:41

## 2024-06-06 RX ADMIN — BUPIVACAINE HYDROCHLORIDE 20 ML: 2.5 INJECTION, SOLUTION EPIDURAL; INFILTRATION; INTRACAUDAL; PERINEURAL at 13:54

## 2024-06-06 RX ADMIN — PHENYLEPHRINE HYDROCHLORIDE 100 MCG: 10 INJECTION INTRAVENOUS at 13:36

## 2024-06-06 RX ADMIN — Medication 10 MG: at 13:16

## 2024-06-06 RX ADMIN — FAMOTIDINE 20 MG: 10 INJECTION, SOLUTION INTRAVENOUS at 07:03

## 2024-06-06 RX ADMIN — DEXAMETHASONE SODIUM PHOSPHATE 4 MG: 4 INJECTION, SOLUTION INTRAMUSCULAR; INTRAVENOUS at 13:54

## 2024-06-06 RX ADMIN — PHENYLEPHRINE HYDROCHLORIDE 100 MCG: 10 INJECTION INTRAVENOUS at 09:40

## 2024-06-06 RX ADMIN — PHENYLEPHRINE HYDROCHLORIDE 100 MCG: 10 INJECTION INTRAVENOUS at 11:54

## 2024-06-06 RX ADMIN — OXYCODONE HYDROCHLORIDE 10 MG: 10 TABLET ORAL at 18:58

## 2024-06-06 RX ADMIN — PHENYLEPHRINE HYDROCHLORIDE 100 MCG: 10 INJECTION INTRAVENOUS at 12:25

## 2024-06-06 RX ADMIN — Medication 20 MG: at 11:15

## 2024-06-06 RX ADMIN — SODIUM CHLORIDE 50 ML/HR: 9 INJECTION, SOLUTION INTRAVENOUS at 07:08

## 2024-06-06 RX ADMIN — Medication 10 MG: at 10:09

## 2024-06-06 RX ADMIN — PHENYLEPHRINE HYDROCHLORIDE 100 MCG: 10 INJECTION INTRAVENOUS at 13:28

## 2024-06-06 RX ADMIN — SUGAMMADEX 200 MG: 100 INJECTION, SOLUTION INTRAVENOUS at 13:55

## 2024-06-06 RX ADMIN — PHENYLEPHRINE HYDROCHLORIDE 100 MCG: 10 INJECTION INTRAVENOUS at 12:57

## 2024-06-06 RX ADMIN — PHENYLEPHRINE HYDROCHLORIDE 100 MCG: 10 INJECTION INTRAVENOUS at 10:56

## 2024-06-06 RX ADMIN — PHENYLEPHRINE HYDROCHLORIDE 100 MCG: 10 INJECTION INTRAVENOUS at 09:57

## 2024-06-06 RX ADMIN — Medication 10 MG: at 09:41

## 2024-06-06 RX ADMIN — ONDANSETRON 4 MG: 2 INJECTION INTRAMUSCULAR; INTRAVENOUS at 12:56

## 2024-06-06 RX ADMIN — PRAVASTATIN SODIUM 40 MG: 40 TABLET ORAL at 20:10

## 2024-06-06 RX ADMIN — LIDOCAINE HYDROCHLORIDE 100 MG: 20 INJECTION, SOLUTION EPIDURAL; INFILTRATION; INTRACAUDAL; PERINEURAL at 13:54

## 2024-06-06 RX ADMIN — METRONIDAZOLE 500 MG: 500 INJECTION, SOLUTION INTRAVENOUS at 22:22

## 2024-06-06 RX ADMIN — INSULIN LISPRO 1 UNITS: 100 INJECTION, SOLUTION INTRAVENOUS; SUBCUTANEOUS at 20:10

## 2024-06-06 RX ADMIN — FENTANYL CITRATE 50 MCG: 50 INJECTION INTRAMUSCULAR; INTRAVENOUS at 07:48

## 2024-06-06 RX ADMIN — Medication 10 MG: at 11:07

## 2024-06-06 RX ADMIN — CYANOCOBALAMIN TAB 1000 MCG 1000 MCG: 1000 TAB at 18:54

## 2024-06-06 RX ADMIN — LIDOCAINE HYDROCHLORIDE 100 MG: 20 INJECTION, SOLUTION EPIDURAL; INFILTRATION; INTRACAUDAL; PERINEURAL at 13:50

## 2024-06-06 RX ADMIN — METRONIDAZOLE 500 MG: 500 INJECTION, SOLUTION INTRAVENOUS at 07:42

## 2024-06-06 RX ADMIN — Medication 2000 UNITS: at 18:54

## 2024-06-06 RX ADMIN — HEPARIN SODIUM 5000 UNITS: 5000 INJECTION INTRAVENOUS; SUBCUTANEOUS at 18:54

## 2024-06-06 RX ADMIN — Medication 10 MG: at 08:39

## 2024-06-06 RX ADMIN — LIDOCAINE HYDROCHLORIDE 80 MG: 20 INJECTION, SOLUTION INTRAVENOUS at 07:48

## 2024-06-06 SDOH — SOCIAL STABILITY: SOCIAL INSECURITY: ARE YOU OR HAVE YOU BEEN THREATENED OR ABUSED PHYSICALLY, EMOTIONALLY, OR SEXUALLY BY ANYONE?: NO

## 2024-06-06 SDOH — ECONOMIC STABILITY: HOUSING INSECURITY
IN THE LAST 12 MONTHS, WAS THERE A TIME WHEN YOU DID NOT HAVE A STEADY PLACE TO SLEEP OR SLEPT IN A SHELTER (INCLUDING NOW)?: NO

## 2024-06-06 SDOH — ECONOMIC STABILITY: TRANSPORTATION INSECURITY
IN THE PAST 12 MONTHS, HAS THE LACK OF TRANSPORTATION KEPT YOU FROM MEDICAL APPOINTMENTS OR FROM GETTING MEDICATIONS?: NO

## 2024-06-06 SDOH — SOCIAL STABILITY: SOCIAL INSECURITY: WERE YOU ABLE TO COMPLETE ALL THE BEHAVIORAL HEALTH SCREENINGS?: YES

## 2024-06-06 SDOH — SOCIAL STABILITY: SOCIAL INSECURITY: HAVE YOU HAD ANY THOUGHTS OF HARMING ANYONE ELSE?: NO

## 2024-06-06 SDOH — ECONOMIC STABILITY: FOOD INSECURITY: WITHIN THE PAST 12 MONTHS, THE FOOD YOU BOUGHT JUST DIDN'T LAST AND YOU DIDN'T HAVE MONEY TO GET MORE.: NEVER TRUE

## 2024-06-06 SDOH — SOCIAL STABILITY: SOCIAL INSECURITY: DOES ANYONE TRY TO KEEP YOU FROM HAVING/CONTACTING OTHER FRIENDS OR DOING THINGS OUTSIDE YOUR HOME?: NO

## 2024-06-06 SDOH — SOCIAL STABILITY: SOCIAL INSECURITY: DO YOU FEEL UNSAFE GOING BACK TO THE PLACE WHERE YOU ARE LIVING?: NO

## 2024-06-06 SDOH — ECONOMIC STABILITY: INCOME INSECURITY: IN THE LAST 12 MONTHS, WAS THERE A TIME WHEN YOU WERE NOT ABLE TO PAY THE MORTGAGE OR RENT ON TIME?: NO

## 2024-06-06 SDOH — SOCIAL STABILITY: SOCIAL INSECURITY: ARE THERE ANY APPARENT SIGNS OF INJURIES/BEHAVIORS THAT COULD BE RELATED TO ABUSE/NEGLECT?: NO

## 2024-06-06 SDOH — ECONOMIC STABILITY: HOUSING INSECURITY: IN THE LAST 12 MONTHS, HOW MANY PLACES HAVE YOU LIVED?: 1

## 2024-06-06 SDOH — ECONOMIC STABILITY: FOOD INSECURITY: WITHIN THE PAST 12 MONTHS, YOU WORRIED THAT YOUR FOOD WOULD RUN OUT BEFORE YOU GOT MONEY TO BUY MORE.: NEVER TRUE

## 2024-06-06 SDOH — ECONOMIC STABILITY: INCOME INSECURITY: HOW HARD IS IT FOR YOU TO PAY FOR THE VERY BASICS LIKE FOOD, HOUSING, MEDICAL CARE, AND HEATING?: NOT HARD AT ALL

## 2024-06-06 SDOH — SOCIAL STABILITY: SOCIAL INSECURITY: ABUSE: ADULT

## 2024-06-06 SDOH — ECONOMIC STABILITY: TRANSPORTATION INSECURITY
IN THE PAST 12 MONTHS, HAS LACK OF TRANSPORTATION KEPT YOU FROM MEETINGS, WORK, OR FROM GETTING THINGS NEEDED FOR DAILY LIVING?: NO

## 2024-06-06 SDOH — SOCIAL STABILITY: SOCIAL INSECURITY: HAVE YOU HAD THOUGHTS OF HARMING ANYONE ELSE?: NO

## 2024-06-06 SDOH — SOCIAL STABILITY: SOCIAL INSECURITY: HAS ANYONE EVER THREATENED TO HURT YOUR FAMILY OR YOUR PETS?: NO

## 2024-06-06 SDOH — SOCIAL STABILITY: SOCIAL INSECURITY: DO YOU FEEL ANYONE HAS EXPLOITED OR TAKEN ADVANTAGE OF YOU FINANCIALLY OR OF YOUR PERSONAL PROPERTY?: NO

## 2024-06-06 SDOH — HEALTH STABILITY: MENTAL HEALTH: CURRENT SMOKER: 0

## 2024-06-06 ASSESSMENT — COGNITIVE AND FUNCTIONAL STATUS - GENERAL
MOVING TO AND FROM BED TO CHAIR: A LITTLE
CLIMB 3 TO 5 STEPS WITH RAILING: A LOT
CLIMB 3 TO 5 STEPS WITH RAILING: A LOT
DRESSING REGULAR LOWER BODY CLOTHING: A LITTLE
CLIMB 3 TO 5 STEPS WITH RAILING: A LOT
WALKING IN HOSPITAL ROOM: A LOT
DRESSING REGULAR UPPER BODY CLOTHING: A LITTLE
CLIMB 3 TO 5 STEPS WITH RAILING: A LOT
DAILY ACTIVITIY SCORE: 19
MOBILITY SCORE: 16
WALKING IN HOSPITAL ROOM: A LITTLE
TOILETING: A LITTLE
TURNING FROM BACK TO SIDE WHILE IN FLAT BAD: A LITTLE
MOVING TO AND FROM BED TO CHAIR: A LITTLE
HELP NEEDED FOR BATHING: A LITTLE
WALKING IN HOSPITAL ROOM: A LITTLE
TURNING FROM BACK TO SIDE WHILE IN FLAT BAD: A LITTLE
DRESSING REGULAR LOWER BODY CLOTHING: A LITTLE
CLIMB 3 TO 5 STEPS WITH RAILING: A LOT
PATIENT BASELINE BEDBOUND: NO
PERSONAL GROOMING: A LITTLE
TOILETING: A LITTLE
STANDING UP FROM CHAIR USING ARMS: A LOT
HELP NEEDED FOR BATHING: A LITTLE
DRESSING REGULAR UPPER BODY CLOTHING: A LITTLE
MOVING TO AND FROM BED TO CHAIR: A LITTLE
MOVING FROM LYING ON BACK TO SITTING ON SIDE OF FLAT BED WITH BEDRAILS: A LITTLE
MOBILITY SCORE: 16
TURNING FROM BACK TO SIDE WHILE IN FLAT BAD: A LITTLE
TOILETING: A LITTLE
PERSONAL GROOMING: A LITTLE
MOVING FROM LYING ON BACK TO SITTING ON SIDE OF FLAT BED WITH BEDRAILS: A LOT
STANDING UP FROM CHAIR USING ARMS: A LOT
MOBILITY SCORE: 16
PERSONAL GROOMING: A LITTLE
HELP NEEDED FOR BATHING: A LITTLE
MOVING FROM LYING ON BACK TO SITTING ON SIDE OF FLAT BED WITH BEDRAILS: A LITTLE
DRESSING REGULAR UPPER BODY CLOTHING: A LOT
MOVING FROM LYING ON BACK TO SITTING ON SIDE OF FLAT BED WITH BEDRAILS: A LITTLE
PERSONAL GROOMING: A LOT
WALKING IN HOSPITAL ROOM: A LITTLE
MOBILITY SCORE: 16
MOBILITY SCORE: 12
TOILETING: A LOT
DAILY ACTIVITIY SCORE: 19
TURNING FROM BACK TO SIDE WHILE IN FLAT BAD: A LITTLE
MOVING FROM LYING ON BACK TO SITTING ON SIDE OF FLAT BED WITH BEDRAILS: A LITTLE
STANDING UP FROM CHAIR USING ARMS: A LOT
DRESSING REGULAR UPPER BODY CLOTHING: A LITTLE
PERSONAL GROOMING: A LITTLE
DAILY ACTIVITIY SCORE: 19
MOVING TO AND FROM BED TO CHAIR: A LITTLE
HELP NEEDED FOR BATHING: A LITTLE
EATING MEALS: A LITTLE
DAILY ACTIVITIY SCORE: 12
WALKING IN HOSPITAL ROOM: A LITTLE
DAILY ACTIVITIY SCORE: 19
MOVING TO AND FROM BED TO CHAIR: A LOT
TOILETING: A LITTLE
HELP NEEDED FOR BATHING: A LOT
DRESSING REGULAR UPPER BODY CLOTHING: A LITTLE
TURNING FROM BACK TO SIDE WHILE IN FLAT BAD: A LOT
DRESSING REGULAR LOWER BODY CLOTHING: TOTAL
STANDING UP FROM CHAIR USING ARMS: A LOT
STANDING UP FROM CHAIR USING ARMS: A LOT

## 2024-06-06 ASSESSMENT — ACTIVITIES OF DAILY LIVING (ADL)
ADEQUATE_TO_COMPLETE_ADL: YES
DRESSING YOURSELF: INDEPENDENT
FEEDING YOURSELF: INDEPENDENT
HEARING - LEFT EAR: FUNCTIONAL
GROOMING: INDEPENDENT
ASSISTIVE_DEVICE: WALKER
TOILETING: INDEPENDENT
BATHING: INDEPENDENT
PATIENT'S MEMORY ADEQUATE TO SAFELY COMPLETE DAILY ACTIVITIES?: YES
JUDGMENT_ADEQUATE_SAFELY_COMPLETE_DAILY_ACTIVITIES: YES
HEARING - RIGHT EAR: FUNCTIONAL
WALKS IN HOME: NEEDS ASSISTANCE

## 2024-06-06 ASSESSMENT — LIFESTYLE VARIABLES
SUBSTANCE_ABUSE_PAST_12_MONTHS: NO
SKIP TO QUESTIONS 9-10: 1
HOW MANY STANDARD DRINKS CONTAINING ALCOHOL DO YOU HAVE ON A TYPICAL DAY: PATIENT DOES NOT DRINK
PRESCIPTION_ABUSE_PAST_12_MONTHS: NO
AUDIT-C TOTAL SCORE: 0
AUDIT-C TOTAL SCORE: 0
HOW OFTEN DO YOU HAVE 6 OR MORE DRINKS ON ONE OCCASION: NEVER
HOW OFTEN DO YOU HAVE A DRINK CONTAINING ALCOHOL: NEVER

## 2024-06-06 ASSESSMENT — PAIN SCALES - GENERAL
PAINLEVEL_OUTOF10: 0 - NO PAIN
PAIN_LEVEL: 0
PAINLEVEL_OUTOF10: 0 - NO PAIN
PAINLEVEL_OUTOF10: 8
PAINLEVEL_OUTOF10: 0 - NO PAIN

## 2024-06-06 ASSESSMENT — PAIN - FUNCTIONAL ASSESSMENT
PAIN_FUNCTIONAL_ASSESSMENT: 0-10
PAIN_FUNCTIONAL_ASSESSMENT: 0-10
PAIN_FUNCTIONAL_ASSESSMENT: CPOT (CRITICAL CARE PAIN OBSERVATION TOOL)
PAIN_FUNCTIONAL_ASSESSMENT: 0-10

## 2024-06-06 ASSESSMENT — PAIN DESCRIPTION - DESCRIPTORS: DESCRIPTORS: ACHING

## 2024-06-06 NOTE — ANESTHESIA PROCEDURE NOTES
"Peripheral Block    Patient location during procedure: OR  Start time: 6/6/2024 1:45 PM  End time: 6/6/2024 1:54 PM  Reason for block: procedure for pain, at surgeon's request and post-op pain management  Staffing  Performed: CRNA   Authorized by: JAH Chaves    Performed by: JAH Chaves  Preanesthetic Checklist  Completed: patient identified, IV checked, site marked, risks and benefits discussed, surgical consent, monitors and equipment checked, pre-op evaluation and timeout performed   Timeout performed at: 6/6/2024 1:44 PM  Peripheral Block  Patient position: laying flat  Prep: ChloraPrep  Patient monitoring: cardiac monitor and continuous pulse ox  Block type: TAP  Laterality: B/L  Injection technique: single-shot  Guidance: ultrasound guided  Local infiltration: bupivicaine  Infiltration strength: 0.3 %  Dose: 40 mL  Needle  Needle gauge: 20 G  Needle localization: ultrasound guidance  Assessment  Injection assessment: negative aspiration for heme, no paresthesia on injection, incremental injection and local visualized surrounding nerve on ultrasound  Paresthesia pain: none  Heart rate change: no  Slow fractionated injection: yes  Additional Notes  Anesthesia consult was placed by Dr. Grimes for post procedural analgesia.  The patient's chart was reviewed and they were deemed an appropriate candidate for the procedure. The patient was educated in detail on the risks, benefits, and alternatives to the block including but not limited to: temporary or permanent nerve damage, bleeding, infection, damage to surrounding tissues, possible block failure and the potential for local anesthesia toxicity syndrome.  The patient expressed understanding and all questions were answered prior to the initiation of the procedure.  Informed consent was also signed by the patient and laterality determined per institutional policy.  A formal \"time out \"consistent with the institutions rules and " regulations was performed by the anesthesia provider and appropriate RN.     Procedure  The patient was placed in the supine position. The patient was marked bilaterally and skin prep applied and allowed to dry. Proper monitors were applied with oxygen.  The patient was placed under general anesthesia (see induction note).     A high frequency ultrasound probe with probe cover was placed over the three layers of the abdominal musculature and grossly identified using sterile coupling gel following institutional skin prep. The three layers of the abdominal musculature were carefully identified MIDAXILLARY region.  A 20g 6 inch stimuplex needle  was then advanced maintaining an in-plane visualization throughout the procedure, under ultrasound guidance from medial to lateral to come to rest just deep to the internal oblique muscle. Upon negative aspiration, 5ml 2% lidocaine 20 ml 0.25% Bupivacaine with 4mg decadron preservative free was administered safely and cautiously on each side. Aspiration every 3-5ml was done to avoid potential intravascular injection.  All injections were done without resistance and were free of blood. The patient tolerated the procedure well. No complications noted.

## 2024-06-06 NOTE — PROGRESS NOTES
Nephrology Progress Note      D/w Dr. Cardenas  Pt s/p sabas murillo  Pt location still in OR, chart reviewed.   Will plan dialysis in am so she won't be dialyzing late into the evening.   K is stable and pt has good UOP so volume has not been an issue.     Pinky Christie, DO

## 2024-06-06 NOTE — CARE PLAN
The patient's goals for the shift include      The clinical goals for the shift include        Problem: Skin  Goal: Decreased wound size/increased tissue granulation at next dressing change  Outcome: Progressing  Flowsheets (Taken 6/6/2024 1649)  Decreased wound size/increased tissue granulation at next dressing change: Promote sleep for wound healing  Goal: Participates in plan/prevention/treatment measures  Outcome: Progressing  Flowsheets (Taken 6/6/2024 1649)  Participates in plan/prevention/treatment measures: Elevate heels  Goal: Promote/optimize nutrition  Outcome: Progressing  Flowsheets (Taken 6/6/2024 1649)  Promote/optimize nutrition: Consume > 50% meals/supplements  Goal: Promote skin healing  Outcome: Progressing  Flowsheets (Taken 6/6/2024 1649)  Promote skin healing: Turn/reposition every 2 hours/use positioning/transfer devices     Problem: Pain  Goal: Takes deep breaths with improved pain control throughout the shift  Outcome: Progressing  Goal: Turns in bed with improved pain control throughout the shift  Outcome: Progressing  Goal: Walks with improved pain control throughout the shift  Outcome: Progressing  Goal: Performs ADL's with improved pain control throughout shift  Outcome: Progressing  Goal: Participates in PT with improved pain control throughout the shift  Outcome: Progressing  Goal: Free from opioid side effects throughout the shift  Outcome: Progressing  Goal: Free from acute confusion related to pain meds throughout the shift  Outcome: Progressing     Problem: Respiratory  Goal: Clear secretions with interventions this shift  Outcome: Progressing  Goal: Minimize anxiety/maximize coping throughout shift  Outcome: Progressing  Goal: Minimal/no exertional discomfort or dyspnea this shift  Outcome: Progressing  Goal: No signs of respiratory distress (eg. Use of accessory muscles. Peds grunting)  Outcome: Progressing  Goal: Patent airway maintained this shift  Outcome: Progressing  Goal:  Tolerate mechanical ventilation evidenced by VS/agitation level this shift  Outcome: Progressing  Goal: Tolerate pulmonary toileting this shift  Outcome: Progressing  Goal: Verbalize decreased shortness of breath this shift  Outcome: Progressing  Goal: Wean oxygen to maintain O2 saturation per order/standard this shift  Outcome: Progressing  Goal: Increase self care and/or family involvement in next 24 hours  Outcome: Progressing

## 2024-06-06 NOTE — ANESTHESIA PROCEDURE NOTES
Airway  Date/Time: 6/6/2024 7:51 AM  Urgency: elective    Airway not difficult    Staffing  Performed: CRNA   Authorized by: JAH Chaves    Performed by: JAH Chaves  Patient location during procedure: OR    Indications and Patient Condition  Spontaneous Ventilation: absent  Sedation level: deep  Preoxygenated: yes  Patient position: sniffing  Mask difficulty assessment: 1 - vent by mask  Planned trial extubation    Final Airway Details  Final airway type: endotracheal airway      Successful airway: ETT  Cuffed: yes   Successful intubation technique: direct laryngoscopy  Facilitating devices/methods: intubating stylet  Blade: Ortega  Blade size: #2  ETT size (mm): 7.0  Cormack-Lehane Classification: grade I - full view of glottis  Placement verified by: chest auscultation and capnometry   Cuff volume (mL): 7  Measured from: gums  ETT to gums (cm): 20  Number of attempts at approach: 1  Ventilation between attempts: none  Number of other approaches attempted: 0    Additional Comments  Atraumatic intubation ortega 2 blade,

## 2024-06-06 NOTE — ANESTHESIA PREPROCEDURE EVALUATION
Patient: Tana Hargrove    Procedure Information       Date/Time: 06/06/24 0730    Procedure: Laparoscopic, possible open, cholecystectomy *SDS*    Location: [facility] OR 03 / Virtual [facility] OR    Surgeons: Ronald Grimes MD            Relevant Problems   Anesthesia (within normal limits)      Cardiac   (+) A-fib (Multi)   (+) Chest pain   (+) Coronary artery disease   (+) Essential (primary) hypertension   (+) HLD (hyperlipidemia)   (+) Hyperlipidemia   (+) Hypertension, essential   (+) Paroxysmal atrial fibrillation (Multi)      Pulmonary   (+) Chronic obstructive pulmonary disease, unspecified (Multi)   (+) Pneumonia due to infectious organism      Neuro   (+) Anxiety   (+) Depression      GI   (+) GERD (gastroesophageal reflux disease)      /Renal   (+) Acute kidney injury (nontraumatic) (CMS-HCC)   (+) ESRD (end stage renal disease) on dialysis (Multi)      Liver   (+) Chronic cholecystitis      Endocrine   (+) Type 2 diabetes mellitus with hyperglycemia, without long-term current use of insulin (Multi)   (+) Type 2 diabetes mellitus without complications (Multi)      Hematology   (+) Anemia due to chronic kidney disease, on chronic dialysis (Multi)   (+) Coagulation defect, unspecified (Multi)   (+) Macrocytic anemia   (+) Myelodysplastic syndrome (Multi)   (+) Normocytic anemia      Musculoskeletal   (+) Cervical spondylosis without myelopathy   (+) Degeneration of intervertebral disc of lumbar region   (+) Lumbar spondylosis   (+) Primary osteoarthritis   (+) Primary osteoarthritis of right knee   (+) Scoliosis of lumbar region due to degenerative disease of spine in adult   (+) Spinal stenosis of lumbar region      ID   (+) Pneumonia due to infectious organism       Clinical information reviewed:   Tobacco  Allergies  Meds   Med Hx  Surg Hx  OB Status  Fam Hx  Soc   Hx        NPO Detail:  NPO/Void Status  Date of Last Liquid: 06/05/24  Time of Last Liquid: 2000  Date of Last Solid: 06/05/24  Time of  Last Solid: 1730         Physical Exam    Airway  Mallampati: I  TM distance: >3 FB  Neck ROM: full     Cardiovascular - normal exam     Dental   (+) upper dentures, lower dentures     Pulmonary - normal exam     Abdominal - normal exam         Anesthesia Plan    History of general anesthesia?: yes  History of complications of general anesthesia?: no    ASA 3     general     The patient is not a current smoker.    intravenous induction   Postoperative administration of opioids is intended.  Anesthetic plan and risks discussed with patient.  Use of blood products discussed with patient who.    Plan discussed with CRNA.

## 2024-06-06 NOTE — ANESTHESIA POSTPROCEDURE EVALUATION
Patient: Tana Hargrove    Procedure Summary       Date: 06/06/24 Room / Location: [facility] 03 / [facility]    Anesthesia Start: 0741 Anesthesia Stop: 1427    Procedure: Laparoscopic partial cholecystectomy *SDS* Diagnosis:       Chronic cholecystitis      (Chronic cholecystitis [K81.1])    Surgeons: Ronald Grimes MD Responsible Provider: JAH Chaves    Anesthesia Type: general ASA Status: 3            Anesthesia Type: general    Vitals Value Taken Time   /66 06/06/24 1540   Temp 36.1 °C (97 °F) 06/06/24 1520   Pulse 67 06/06/24 1540   Resp 22 06/06/24 1540   SpO2 99 % 06/06/24 1540       Anesthesia Post Evaluation    Patient location during evaluation: PACU  Patient participation: complete - patient participated  Level of consciousness: awake  Pain score: 0  Pain management: adequate  Airway patency: patent  Cardiovascular status: acceptable  Respiratory status: acceptable  Hydration status: acceptable  Postoperative Nausea and Vomiting: none    There were no known notable events for this encounter.

## 2024-06-06 NOTE — BRIEF OP NOTE
Date: 2024  OR Location: [facility]    Name: Tana Hargrove, : 1945, Age: 79 y.o., MRN: [id number], Sex: female    Diagnosis  Pre-op Diagnosis     * Chronic cholecystitis [K81.1] Post-op Diagnosis     * Chronic cholecystitis [K81.1]     Procedures  Laparoscopic, possible open, cholecystectomy *SDS*  72317 - [facility] LAPAROSCOPY SURG CHOLECYSTECTOMY  Laparoscopic lysis of adhesions  Laparoscopic fenestrated subtotal cholecystectomy  Drain placement    Surgeons      * Ronald Grimes - Primary    Resident/Fellow/Other Assistant:  Surgeons and Role:     * Katrin Hargrove MD - Assisting  Kan Omer SA      Procedure Summary  Anesthesia: General  ASA: III  Anesthesia Staff: CRNA: GAB Chaves-CRNA  Estimated Blood Loss: 50mL  Intra-op Medications: Administrations occurring from 0730 to 1000 on 24:  * No intraprocedure medications in log *           Anesthesia Record               Intraprocedure I/O Totals          Intake    sodium chloride 0.9% infusion 1200.00 mL    ciprofloxacin (Cipro) IVPB 400 mg 200.00 mL    metroNIDAZOLE (Flagyl) 500 mg in NaCl (iso-os) 100 mL 100.00 mL    Total Intake 1500 mL       Output    Est. Blood Loss 50 mL    Total Output 50 mL       Net    Net Volume 1450 mL          Specimen:   ID Type Source Tests Collected by Time   1 : partial gallbladder with contents Tissue GALLBLADDER CHOLECYSTECTOMY SURGICAL PATHOLOGY EXAM Ronald Grimes MD 2024 1220        Staff:   Circulator: Molly Diallo Person: Marina Diallo Person: Patrizia Bush Circulator: Adilene          Findings:   Acute on chronic inflammation surrounding gallbladder with multiple small stones  Serosal injury on R hepatic flexure repaired with laparoscopic silk sutures      Complications:  None; patient tolerated the procedure well.     Disposition: PACU - hemodynamically stable.  Condition: stable  Specimens Collected:   ID Type Source Tests Collected by Time   1 : partial gallbladder with  contents Tissue GALLBLADDER CHOLECYSTECTOMY SURGICAL PATHOLOGY EXAM Ronald Grimes MD 6/6/2024 1220     Attending Attestation: A qualified resident physician was not available.    Ronald Grimes  Phone Number: [phone]

## 2024-06-06 NOTE — PROGRESS NOTES
Medication Adjustment    The following medication(s) was/were adjusted for Tana Hargrove per protocol/policy due to altered renal function.  6/6 Scr= 2.1; Calc crcl=24.3 ml/min    Medication(s) adjusted:   Ciprofloxacin 400 mg q12h changed to Cipro 400 mg q24h    Rossy Schmitt, PharmD

## 2024-06-06 NOTE — OP NOTE
Laparoscopic, possible open, cholecystectomy *SDS* Operative Note     Date: 2024  OR Location: [facility]    Name: Tana Hargrove, : 1945, Age: 79 y.o., MRN: [id number], Sex: female    Diagnosis  Pre-op Diagnosis     * Chronic cholecystitis [K81.1] Post-op Diagnosis     * Chronic cholecystitis [K81.1]     Procedures  Laparoscopic, possible open, cholecystectomy *SDS*  63977 - [place] LAPAROSCOPY SURG CHOLECYSTECTOMY  Laparoscopic lysis of adhesions  Laparoscopic fenestrated subtotal cholecystectomy  Drain placement    Surgeons      * Ronald Grimes - Primary    Resident/Fellow/Other Assistant:  Surgeons and Role:     * Katrin Hargrove MD - Assisting  Kan Omer SA      Procedure Summary  Anesthesia: General  ASA: III  Anesthesia Staff: CRNA: GAB Chaves-CRNA  Estimated Blood Loss: 50mL  Intra-op Medications: Administrations occurring from 0730 to 1000 on 24:  * No intraprocedure medications in log *           Anesthesia Record               Intraprocedure I/O Totals          Intake    sodium chloride 0.9% infusion 1200.00 mL    ciprofloxacin (Cipro) IVPB 400 mg 200.00 mL    metroNIDAZOLE (Flagyl) 500 mg in NaCl (iso-os) 100 mL 100.00 mL    Total Intake 1500 mL       Output    Est. Blood Loss 50 mL    Total Output 50 mL       Net    Net Volume 1450 mL          Specimen:   ID Type Source Tests Collected by Time   1 : partial gallbladder with contents Tissue GALLBLADDER CHOLECYSTECTOMY SURGICAL PATHOLOGY EXAM Ronald Grimes MD 2024 1220        Staff:   Circulator: Molly Diallo Person: Marina Diallo Person: Patrizia Bush Circulator: Adilene         Drains and/or Catheters:   Closed/Suction Drain 1 Right RLQ Bulb 10 Fr. (Active)       NG/OG/Feeding Tube (Active)       [REMOVED] NG/OG/Feeding Tube (Removed)       Findings:   Acute on chronic inflammation surrounding gallbladder with omentum adhesed to liver, gallbladder, and duodenum.    Unable to define structures in the  hepatocystic triangle.   Multiple stones within the gallbladder with end of drain coiled within the lumen.    Serosal injury involving R colon repaired with silk Lembert sutures.      Indications: Tana Hargrove is an 79 y.o. female who is having surgery for Chronic cholecystitis [K81.1].     The patient was seen in the preoperative area. The risks, benefits, complications, treatment options, non-operative alternatives, expected recovery and outcomes were discussed with the patient. The possibilities of reaction to medication, pulmonary aspiration, injury to surrounding structures, bleeding, recurrent infection, the need for additional procedures, failure to diagnose a condition, and creating a complication requiring transfusion or operation were discussed with the patient. The patient concurred with the proposed plan, giving informed consent.  The site of surgery was properly noted/marked if necessary per policy. The patient has been actively warmed in preoperative area. Preoperative antibiotics have been ordered and given within 1 hours of incision. Venous thrombosis prophylaxis have been ordered including bilateral sequential compression devices    Procedure Details:   The patient was brought to the operating room and placed supine on the operating table.  Sequential compression devices were put on both lower extremities.  Arms were left untucked by the side of the patient.  IV antibiotics were infused.  An informal huddle was performed verifying the correct patient and procedures to be performed.  General endotracheal anaesthesia was induced.    The abdomen was prepped and draped in a sterile fashion including the percutaneous cholecystostomy drain. A formal timeout was performed.     Local anaesthetic agent was infiltrated intradermally just above the umbilicus.  An incision was made in this supraumbilical region.  This was deepened down to the fascia which was then grasped with Kocher clamps.  A vertical  incision was made through the fascia and entry into the abdominal cavity was confirmed with a finger sweep and visual confirmation.  Two #0 vicryl stay sutures were placed on either aspects of the fascial incision.  The Oro port was inserted.  The abdomen was then insufflated to approximately 12-15 mmHg.  The camera and laparoscope were introduced through the Taran port.  The abdomen was then scanned - there no injuries noted on insertion of the port.  Three accessory ports were placed under direct visualisation: a 12mm port in the subxiphoid region, a 5mm port in the midclavicular line and inferior to the subcostal margin, and an additional 5mm port in the midaxillary line inferior to the subcostal margin.  The patient was then placed in reverse Trendelenburg position and rolled to the left.  Due to ease of dissection, we ended up placing a third 5mm port between the midclavicular and most lateral ports.      There was dense omental adhesions involving the liver up to the parietal peritoneum.  This was slowly and carefully taken down using the LigaSure device.  A window was open between the proximal duodenum and omental adhesions which allowed us some space to work within.  Use meticulous dissection and electrocautery with the LigaSure device, the omental adhesions were taken down surrounding the gallbladder.  However, in doing so, I inadvertently did cause a thermal injury to the serosa on the hepatic flexure of the colon which was repaired using a #3-0 silk suture in a Lembert fashion.  I spent approximately 3 hours performing the adhesiolysis and was eventually able to identify the the fundus of the gallbladder.   The assistant then grasped the fundus of the gallbladder pushing up cephalad.  The gallbladder was very much intrahepatic which added to the difficulty of the case.  I was eventually able to free up the lateral side of the gallbladder and somewhat on the medial aspect and creating a window between  "the gallbladder, hepatocystic structures, and the liver.  I was not able to identify the structures within the hepatocystic triangle and requested help from .  Due to the acute on chronic inflammation surrounding the  hepatocystic triangle and inability fo identify the critical view of safety,  assisted in performing a subtotal cholecystectomy.  Using the LigaSure device, we opened up the free wall of the gallbladder and worked from the body upwards to remove as much of the free side of the wall as possible whilst staying above the umbilical fissure and Rouviere's sulcus.  All visible gallstones were retrieved and suctioned out of the open gallbladder.  We were unable to visualise the internal orifice of the cystic duct due to the intense and severe inflammation and elected not to suture it internally.  Instead, we cauterised the internal mucosa of the gallbladder to prevent mucosal secretion and to aid in earlier closure of the cystic duct.  The resected \"free\" gallbladder wall and visible stones were placed in an endobag and removed from the abdominal cavity.  After doing so, there were some bleeding points visualised on the liver which were controlled with electrocautery and tamponade.  Once there were no active bleeding points, the fenestrated gallbladder and liver bed was packed with Surgicel and Derik.  A flat 10Fr JANE drain was placed at the bed with its tubing exiting the most lateral port side of the abdominal wall.  The drain was secured to the skin using a #2-0 nylon suture.  Each of the ports was removed under direct visualisation. The abdomen was desufflated. The fascial defect was then closed with the stay sutures.  All skin incisions were then closed with #4-0 monocryl except for the drain site.  Dermabond was placed on all incisions except for the drain site which was dressed with a drain sponge.  The patient was successfully extubated and brought to the PACU in stable condition.  All " needles, instruments, and sponges were correct to the count.     Complications:  None; patient tolerated the procedure well.    Disposition: PACU - hemodynamically stable.  Condition: stable     Attending Attestation: I was present and scrubbed for the entire procedure.    Ronald Grimes  Phone Number: [phone]

## 2024-06-07 ENCOUNTER — APPOINTMENT (OUTPATIENT)
Dept: DIALYSIS | Facility: HOSPITAL | Age: 79
End: 2024-06-07
Payer: MEDICARE

## 2024-06-07 LAB
ALBUMIN SERPL BCP-MCNC: 2.9 G/DL (ref 3.4–5)
ALBUMIN SERPL BCP-MCNC: 3.4 G/DL (ref 3.4–5)
ALP SERPL-CCNC: 102 U/L (ref 33–136)
ALP SERPL-CCNC: 93 U/L (ref 33–136)
ALT SERPL W P-5'-P-CCNC: 34 U/L (ref 7–45)
ALT SERPL W P-5'-P-CCNC: 35 U/L (ref 7–45)
ANION GAP SERPL CALC-SCNC: 13 MMOL/L (ref 10–20)
AST SERPL W P-5'-P-CCNC: 24 U/L (ref 9–39)
AST SERPL W P-5'-P-CCNC: 25 U/L (ref 9–39)
BASOPHILS # BLD AUTO: 0.01 X10*3/UL (ref 0–0.1)
BASOPHILS NFR BLD AUTO: 0.1 %
BILIRUB DIRECT SERPL-MCNC: 0 MG/DL (ref 0–0.3)
BILIRUB DIRECT SERPL-MCNC: 0.1 MG/DL (ref 0–0.3)
BILIRUB SERPL-MCNC: 0.5 MG/DL (ref 0–1.2)
BILIRUB SERPL-MCNC: 1 MG/DL (ref 0–1.2)
BUN SERPL-MCNC: 36 MG/DL (ref 6–23)
CALCIUM SERPL-MCNC: 7.5 MG/DL (ref 8.6–10.3)
CHLORIDE SERPL-SCNC: 110 MMOL/L (ref 98–107)
CO2 SERPL-SCNC: 21 MMOL/L (ref 21–32)
CREAT SERPL-MCNC: 2.22 MG/DL (ref 0.5–1.05)
EGFRCR SERPLBLD CKD-EPI 2021: 22 ML/MIN/1.73M*2
EOSINOPHIL # BLD AUTO: 0 X10*3/UL (ref 0–0.4)
EOSINOPHIL NFR BLD AUTO: 0 %
ERYTHROCYTE [DISTWIDTH] IN BLOOD BY AUTOMATED COUNT: 23.7 % (ref 11.5–14.5)
GLUCOSE BLD MANUAL STRIP-MCNC: 111 MG/DL (ref 74–99)
GLUCOSE BLD MANUAL STRIP-MCNC: 134 MG/DL (ref 74–99)
GLUCOSE BLD MANUAL STRIP-MCNC: 134 MG/DL (ref 74–99)
GLUCOSE BLD MANUAL STRIP-MCNC: 153 MG/DL (ref 74–99)
GLUCOSE BLD MANUAL STRIP-MCNC: 159 MG/DL (ref 74–99)
GLUCOSE BLD MANUAL STRIP-MCNC: 181 MG/DL (ref 74–99)
GLUCOSE SERPL-MCNC: 152 MG/DL (ref 74–99)
HCT VFR BLD AUTO: 20.4 % (ref 36–46)
HGB BLD-MCNC: 6.6 G/DL (ref 12–16)
HYPOCHROMIA BLD QL SMEAR: NORMAL
IMM GRANULOCYTES # BLD AUTO: 0.07 X10*3/UL (ref 0–0.5)
IMM GRANULOCYTES NFR BLD AUTO: 0.7 % (ref 0–0.9)
LIPASE SERPL-CCNC: 19 U/L (ref 9–82)
LYMPHOCYTES # BLD AUTO: 0.73 X10*3/UL (ref 0.8–3)
LYMPHOCYTES NFR BLD AUTO: 6.9 %
MAGNESIUM SERPL-MCNC: 1.37 MG/DL (ref 1.6–2.4)
MCH RBC QN AUTO: 29.7 PG (ref 26–34)
MCHC RBC AUTO-ENTMCNC: 32.4 G/DL (ref 32–36)
MCV RBC AUTO: 92 FL (ref 80–100)
MONOCYTES # BLD AUTO: 0.77 X10*3/UL (ref 0.05–0.8)
MONOCYTES NFR BLD AUTO: 7.3 %
NEUTROPHILS # BLD AUTO: 8.99 X10*3/UL (ref 1.6–5.5)
NEUTROPHILS NFR BLD AUTO: 85 %
NRBC BLD-RTO: 0 /100 WBCS (ref 0–0)
OVALOCYTES BLD QL SMEAR: NORMAL
PHOSPHATE SERPL-MCNC: 4.3 MG/DL (ref 2.5–4.9)
PLATELET # BLD AUTO: 256 X10*3/UL (ref 150–450)
POTASSIUM SERPL-SCNC: 5.3 MMOL/L (ref 3.5–5.3)
PREALB SERPL-MCNC: 18.8 MG/DL (ref 18–40)
PROT SERPL-MCNC: 4.7 G/DL (ref 6.4–8.2)
PROT SERPL-MCNC: 5.6 G/DL (ref 6.4–8.2)
RBC # BLD AUTO: 2.22 X10*6/UL (ref 4–5.2)
RBC MORPH BLD: NORMAL
SODIUM SERPL-SCNC: 139 MMOL/L (ref 136–145)
WBC # BLD AUTO: 10.6 X10*3/UL (ref 4.4–11.3)

## 2024-06-07 PROCEDURE — 1100000001 HC PRIVATE ROOM DAILY

## 2024-06-07 PROCEDURE — 2500000004 HC RX 250 GENERAL PHARMACY W/ HCPCS (ALT 636 FOR OP/ED): Performed by: SURGERY

## 2024-06-07 PROCEDURE — 84134 ASSAY OF PREALBUMIN: CPT | Mod: PORLAB | Performed by: SURGERY

## 2024-06-07 PROCEDURE — 83735 ASSAY OF MAGNESIUM: CPT | Performed by: SURGERY

## 2024-06-07 PROCEDURE — 36430 TRANSFUSION BLD/BLD COMPNT: CPT

## 2024-06-07 PROCEDURE — 84075 ASSAY ALKALINE PHOSPHATASE: CPT | Performed by: SURGERY

## 2024-06-07 PROCEDURE — P9040 RBC LEUKOREDUCED IRRADIATED: HCPCS

## 2024-06-07 PROCEDURE — 84100 ASSAY OF PHOSPHORUS: CPT | Performed by: INTERNAL MEDICINE

## 2024-06-07 PROCEDURE — 36415 COLL VENOUS BLD VENIPUNCTURE: CPT | Performed by: SURGERY

## 2024-06-07 PROCEDURE — 82947 ASSAY GLUCOSE BLOOD QUANT: CPT

## 2024-06-07 PROCEDURE — 2500000004 HC RX 250 GENERAL PHARMACY W/ HCPCS (ALT 636 FOR OP/ED)

## 2024-06-07 PROCEDURE — 8010000001 HC DIALYSIS - HEMODIALYSIS PER DAY

## 2024-06-07 PROCEDURE — 2500000001 HC RX 250 WO HCPCS SELF ADMINISTERED DRUGS (ALT 637 FOR MEDICARE OP): Performed by: SURGERY

## 2024-06-07 PROCEDURE — 99024 POSTOP FOLLOW-UP VISIT: CPT | Performed by: SURGERY

## 2024-06-07 PROCEDURE — 82374 ASSAY BLOOD CARBON DIOXIDE: CPT | Performed by: INTERNAL MEDICINE

## 2024-06-07 PROCEDURE — 85025 COMPLETE CBC W/AUTO DIFF WBC: CPT | Performed by: SURGERY

## 2024-06-07 PROCEDURE — 2500000004 HC RX 250 GENERAL PHARMACY W/ HCPCS (ALT 636 FOR OP/ED): Performed by: INTERNAL MEDICINE

## 2024-06-07 PROCEDURE — 5A1D70Z PERFORMANCE OF URINARY FILTRATION, INTERMITTENT, LESS THAN 6 HOURS PER DAY: ICD-10-PCS | Performed by: SURGERY

## 2024-06-07 PROCEDURE — 2500000005 HC RX 250 GENERAL PHARMACY W/O HCPCS: Performed by: SURGERY

## 2024-06-07 PROCEDURE — 2500000002 HC RX 250 W HCPCS SELF ADMINISTERED DRUGS (ALT 637 FOR MEDICARE OP, ALT 636 FOR OP/ED): Performed by: SURGERY

## 2024-06-07 PROCEDURE — 83690 ASSAY OF LIPASE: CPT | Performed by: SURGERY

## 2024-06-07 PROCEDURE — 82248 BILIRUBIN DIRECT: CPT | Performed by: SURGERY

## 2024-06-07 RX ORDER — ACETAMINOPHEN 325 MG/1
650 TABLET ORAL EVERY 6 HOURS PRN
Status: DISCONTINUED | OUTPATIENT
Start: 2024-06-07 | End: 2024-06-08

## 2024-06-07 RX ORDER — HEPARIN 100 UNIT/ML
1.6 SYRINGE INTRAVENOUS AS NEEDED
Status: DISCONTINUED | OUTPATIENT
Start: 2024-06-07 | End: 2024-06-13 | Stop reason: HOSPADM

## 2024-06-07 RX ORDER — ACETAMINOPHEN 325 MG/1
650 TABLET ORAL EVERY 6 HOURS PRN
Status: DISCONTINUED | OUTPATIENT
Start: 2024-06-07 | End: 2024-06-07

## 2024-06-07 RX ORDER — MAGNESIUM SULFATE HEPTAHYDRATE 40 MG/ML
2 INJECTION, SOLUTION INTRAVENOUS ONCE
Status: COMPLETED | OUTPATIENT
Start: 2024-06-07 | End: 2024-06-07

## 2024-06-07 RX ORDER — DEXTROSE 50 % IN WATER (D50W) INTRAVENOUS SYRINGE
25
Status: DISCONTINUED | OUTPATIENT
Start: 2024-06-07 | End: 2024-06-13 | Stop reason: HOSPADM

## 2024-06-07 RX ORDER — MULTIVIT-MIN/IRON FUM/FOLIC AC 7.5 MG-4
1 TABLET ORAL DAILY
Status: DISCONTINUED | OUTPATIENT
Start: 2024-06-07 | End: 2024-06-13 | Stop reason: HOSPADM

## 2024-06-07 RX ORDER — INSULIN LISPRO 100 [IU]/ML
0-10 INJECTION, SOLUTION INTRAVENOUS; SUBCUTANEOUS
Status: DISCONTINUED | OUTPATIENT
Start: 2024-06-07 | End: 2024-06-13 | Stop reason: HOSPADM

## 2024-06-07 RX ORDER — HEPARIN SODIUM 1000 [USP'U]/ML
2000 INJECTION, SOLUTION INTRAVENOUS; SUBCUTANEOUS
Status: DISCONTINUED | OUTPATIENT
Start: 2024-06-07 | End: 2024-06-12

## 2024-06-07 RX ORDER — OXYCODONE HYDROCHLORIDE 5 MG/1
5 TABLET ORAL EVERY 6 HOURS PRN
Status: DISCONTINUED | OUTPATIENT
Start: 2024-06-07 | End: 2024-06-13 | Stop reason: HOSPADM

## 2024-06-07 RX ORDER — DEXTROSE 50 % IN WATER (D50W) INTRAVENOUS SYRINGE
12.5
Status: DISCONTINUED | OUTPATIENT
Start: 2024-06-07 | End: 2024-06-13 | Stop reason: HOSPADM

## 2024-06-07 RX ORDER — OXYCODONE HYDROCHLORIDE 5 MG/1
5 TABLET ORAL EVERY 6 HOURS PRN
Status: DISCONTINUED | OUTPATIENT
Start: 2024-06-07 | End: 2024-06-07

## 2024-06-07 RX ORDER — ASCORBIC ACID 500 MG
500 TABLET ORAL DAILY
Status: DISCONTINUED | OUTPATIENT
Start: 2024-06-07 | End: 2024-06-13 | Stop reason: HOSPADM

## 2024-06-07 RX ORDER — ZINC SULFATE 50(220)MG
50 CAPSULE ORAL DAILY
Status: DISCONTINUED | OUTPATIENT
Start: 2024-06-07 | End: 2024-06-13 | Stop reason: HOSPADM

## 2024-06-07 RX ADMIN — HEPARIN SODIUM 1600 UNITS: 1000 INJECTION INTRAVENOUS; SUBCUTANEOUS at 13:00

## 2024-06-07 RX ADMIN — Medication 4 L/MIN: at 19:59

## 2024-06-07 RX ADMIN — PREGABALIN 100 MG: 50 CAPSULE ORAL at 13:18

## 2024-06-07 RX ADMIN — Medication 2 L/MIN: at 08:00

## 2024-06-07 RX ADMIN — METRONIDAZOLE 500 MG: 500 INJECTION, SOLUTION INTRAVENOUS at 14:35

## 2024-06-07 RX ADMIN — METRONIDAZOLE 500 MG: 500 INJECTION, SOLUTION INTRAVENOUS at 06:00

## 2024-06-07 RX ADMIN — MAGNESIUM SULFATE HEPTAHYDRATE 2 G: 40 INJECTION, SOLUTION INTRAVENOUS at 08:53

## 2024-06-07 RX ADMIN — AMLODIPINE BESYLATE 10 MG: 10 TABLET ORAL at 13:18

## 2024-06-07 RX ADMIN — CARVEDILOL 12.5 MG: 12.5 TABLET, FILM COATED ORAL at 13:18

## 2024-06-07 RX ADMIN — Medication 1 TABLET: at 14:36

## 2024-06-07 RX ADMIN — CYANOCOBALAMIN TAB 1000 MCG 1000 MCG: 1000 TAB at 08:53

## 2024-06-07 RX ADMIN — METRONIDAZOLE 500 MG: 500 INJECTION, SOLUTION INTRAVENOUS at 22:51

## 2024-06-07 RX ADMIN — Medication 4 L/MIN: at 20:00

## 2024-06-07 RX ADMIN — HEPARIN SODIUM 5000 UNITS: 5000 INJECTION INTRAVENOUS; SUBCUTANEOUS at 04:21

## 2024-06-07 RX ADMIN — CIPROFLOXACIN 400 MG: 400 INJECTION, SOLUTION INTRAVENOUS at 11:00

## 2024-06-07 RX ADMIN — LOSARTAN POTASSIUM 50 MG: 50 TABLET, FILM COATED ORAL at 13:18

## 2024-06-07 RX ADMIN — INSULIN LISPRO 1 UNITS: 100 INJECTION, SOLUTION INTRAVENOUS; SUBCUTANEOUS at 00:04

## 2024-06-07 RX ADMIN — OXYCODONE HYDROCHLORIDE AND ACETAMINOPHEN 500 MG: 500 TABLET ORAL at 13:18

## 2024-06-07 RX ADMIN — HEPARIN SODIUM 5000 UNITS: 5000 INJECTION INTRAVENOUS; SUBCUTANEOUS at 16:45

## 2024-06-07 RX ADMIN — CARVEDILOL 12.5 MG: 12.5 TABLET, FILM COATED ORAL at 21:17

## 2024-06-07 RX ADMIN — INSULIN LISPRO 2 UNITS: 100 INJECTION, SOLUTION INTRAVENOUS; SUBCUTANEOUS at 17:13

## 2024-06-07 RX ADMIN — PRAVASTATIN SODIUM 40 MG: 40 TABLET ORAL at 21:00

## 2024-06-07 RX ADMIN — CALCIUM CHLORIDE 1 G: 100 INJECTION INTRAVENOUS; INTRAVENTRICULAR at 13:19

## 2024-06-07 RX ADMIN — Medication 2000 UNITS: at 08:53

## 2024-06-07 RX ADMIN — Medication 50 MG OF ELEMENTAL ZINC: at 14:15

## 2024-06-07 ASSESSMENT — COGNITIVE AND FUNCTIONAL STATUS - GENERAL
MOVING TO AND FROM BED TO CHAIR: A LOT
PERSONAL GROOMING: A LOT
DRESSING REGULAR LOWER BODY CLOTHING: TOTAL
STANDING UP FROM CHAIR USING ARMS: A LOT
PERSONAL GROOMING: A LOT
DAILY ACTIVITIY SCORE: 12
MOVING TO AND FROM BED TO CHAIR: TOTAL
DRESSING REGULAR UPPER BODY CLOTHING: A LOT
MOVING FROM LYING ON BACK TO SITTING ON SIDE OF FLAT BED WITH BEDRAILS: A LOT
DRESSING REGULAR LOWER BODY CLOTHING: TOTAL
MOVING FROM LYING ON BACK TO SITTING ON SIDE OF FLAT BED WITH BEDRAILS: A LOT
STANDING UP FROM CHAIR USING ARMS: A LOT
HELP NEEDED FOR BATHING: A LOT
MOVING TO AND FROM BED TO CHAIR: TOTAL
DRESSING REGULAR UPPER BODY CLOTHING: A LOT
HELP NEEDED FOR BATHING: A LOT
STANDING UP FROM CHAIR USING ARMS: A LOT
EATING MEALS: A LITTLE
CLIMB 3 TO 5 STEPS WITH RAILING: A LOT
PERSONAL GROOMING: A LOT
HELP NEEDED FOR BATHING: A LOT
DRESSING REGULAR UPPER BODY CLOTHING: A LOT
MOVING TO AND FROM BED TO CHAIR: A LOT
EATING MEALS: A LITTLE
MOVING FROM LYING ON BACK TO SITTING ON SIDE OF FLAT BED WITH BEDRAILS: A LOT
MOVING FROM LYING ON BACK TO SITTING ON SIDE OF FLAT BED WITH BEDRAILS: A LOT
PERSONAL GROOMING: A LOT
TOILETING: A LOT
DRESSING REGULAR UPPER BODY CLOTHING: A LOT
DRESSING REGULAR LOWER BODY CLOTHING: TOTAL
DRESSING REGULAR LOWER BODY CLOTHING: TOTAL
CLIMB 3 TO 5 STEPS WITH RAILING: TOTAL
WALKING IN HOSPITAL ROOM: A LOT
DAILY ACTIVITIY SCORE: 12
TURNING FROM BACK TO SIDE WHILE IN FLAT BAD: A LOT
WALKING IN HOSPITAL ROOM: A LOT
STANDING UP FROM CHAIR USING ARMS: A LOT
EATING MEALS: A LITTLE
TURNING FROM BACK TO SIDE WHILE IN FLAT BAD: A LOT
MOBILITY SCORE: 12
TURNING FROM BACK TO SIDE WHILE IN FLAT BAD: A LOT
MOBILITY SCORE: 10
MOBILITY SCORE: 12
MOBILITY SCORE: 10
TURNING FROM BACK TO SIDE WHILE IN FLAT BAD: A LOT
TOILETING: A LOT
HELP NEEDED FOR BATHING: A LOT
DAILY ACTIVITIY SCORE: 12
CLIMB 3 TO 5 STEPS WITH RAILING: TOTAL
CLIMB 3 TO 5 STEPS WITH RAILING: A LOT
TOILETING: A LOT
EATING MEALS: A LITTLE
TOILETING: A LOT
WALKING IN HOSPITAL ROOM: A LOT
WALKING IN HOSPITAL ROOM: A LOT
DAILY ACTIVITIY SCORE: 12

## 2024-06-07 ASSESSMENT — PAIN - FUNCTIONAL ASSESSMENT
PAIN_FUNCTIONAL_ASSESSMENT: 0-10

## 2024-06-07 ASSESSMENT — PAIN SCALES - GENERAL
PAINLEVEL_OUTOF10: 0 - NO PAIN

## 2024-06-07 NOTE — PROGRESS NOTES
Physical Therapy                 Therapy Communication Note    Patient Name: Tana Hargrove  MRN: [id number]  Today's Date: 6/7/2024     Discipline: Physical Therapy    Missed Visit Reason: Missed Visit Reason: Patient refused (Pt declined intervention on this date, was agreeable to attempt once feeling less fatigued tomorrow.)    Missed Time: Attempt    LIDIA CORTEZPT     complains of pain/discomfort

## 2024-06-07 NOTE — CARE PLAN
Problem: Skin  Goal: Decreased wound size/increased tissue granulation at next dressing change  Outcome: Progressing  Flowsheets (Taken 6/7/2024 1634)  Decreased wound size/increased tissue granulation at next dressing change:   Promote sleep for wound healing   Protective dressings over bony prominences  Goal: Promote/optimize nutrition  Outcome: Progressing  Flowsheets (Taken 6/7/2024 1634)  Promote/optimize nutrition:   Assist with feeding   Discuss with provider if NPO > 2 days   Monitor/record intake including meals   Consume > 50% meals/supplements  Goal: Promote skin healing  Outcome: Progressing  Flowsheets (Taken 6/7/2024 1634)  Promote skin healing:   Assess skin/pad under line(s)/device(s)   Ensure correct size (line/device) and apply per  instructions   Protective dressings over bony prominences   Rotate device position/do not position patient on device     Problem: Skin  Goal: Promote/optimize nutrition  Outcome: Progressing  Flowsheets (Taken 6/7/2024 1634)  Promote/optimize nutrition:   Assist with feeding   Discuss with provider if NPO > 2 days   Monitor/record intake including meals   Consume > 50% meals/supplements     Problem: Skin  Goal: Promote skin healing  Outcome: Progressing  Flowsheets (Taken 6/7/2024 1634)  Promote skin healing:   Assess skin/pad under line(s)/device(s)   Ensure correct size (line/device) and apply per  instructions   Protective dressings over bony prominences   Rotate device position/do not position patient on device     Problem: Pain  Goal: Takes deep breaths with improved pain control throughout the shift  Outcome: Progressing  Goal: Turns in bed with improved pain control throughout the shift  Outcome: Progressing  Goal: Walks with improved pain control throughout the shift  Outcome: Progressing  Goal: Performs ADL's with improved pain control throughout shift  Outcome: Progressing  Goal: Participates in PT with improved pain control  throughout the shift  Outcome: Progressing  Goal: Free from opioid side effects throughout the shift  Outcome: Progressing  Goal: Free from acute confusion related to pain meds throughout the shift  Outcome: Progressing        The clinical goals for the shift include patient will remain hemodynamically stable    No fall or injury this shift. Current safety interventions continue, All needs met this shift. No new skin breakdown this shift.

## 2024-06-07 NOTE — CARE PLAN
Problem: Discharge Planning  Goal: Discharge to home or other facility with appropriate resources  Outcome: Not Progressing     Problem: Pain  Goal: Turns in bed with improved pain control throughout the shift  Outcome: Progressing     Problem: Respiratory  Goal: Tolerate mechanical ventilation evidenced by VS/agitation level this shift  Outcome: Progressing     Problem: Chronic Conditions and Co-morbidities  Goal: Patient's chronic conditions and co-morbidity symptoms are monitored and maintained or improved  Outcome: Progressing     Problem: Diabetes  Goal: Vital signs within normal range for age by end of shift  Outcome: Progressing     Problem: Skin  Goal: Participates in plan/prevention/treatment measures  Outcome: Met  Flowsheets (Taken 6/6/2024 2257)  Participates in plan/prevention/treatment measures: Elevate heels     Problem: Safety - Adult  Goal: Free from fall injury  Outcome: Met

## 2024-06-07 NOTE — PROGRESS NOTES
Tana Hargrove is a 79 y.o. female admitted for Chronic cholecystitis. Pharmacy reviewed the patient's hgura-ai-wqqranenf medications and allergies for accuracy.    The list below reflects the PTA list prior to pharmacy medication history. A summary a changes to the PTA medication list has been listed below. Please review each medication in order reconciliation for additional clarification and justification.    Source of information:  Facility List    Medications added:    Medications modified:    Medications to be removed:    Medications of concern:  Amlodipine 10mg, Carvedilol 12.5mg, Vitamin D3 2000 units, Vitamin B-12 1000mcg- these meds aren't on the list from the facility      Prior to Admission Medications   Prescriptions Last Dose Informant Patient Reported? Taking?   SITagliptin phosphate (Januvia) 25 mg tablet 6/5/2024  No Yes   Sig: Take 1 tablet (25 mg) by mouth once daily.   amLODIPine (Norvasc) 10 mg tablet 6/5/2024  No Yes   Sig: Take 1 tablet (10 mg) by mouth once daily.   carvedilol (Coreg) 12.5 mg tablet 6/5/2024  No Yes   Sig: Take 1 tablet (12.5 mg) by mouth 2 times a day.   cholecalciferol (Vitamin D-3) 50 MCG (2000 UT) tablet 6/5/2024  Yes Yes   Sig: Take 1 tablet (2,000 Units) by mouth once daily.   cyanocobalamin (Vitamin B-12) 1,000 mcg tablet 6/5/2024  Yes Yes   Sig: Take 1 tablet (1,000 mcg) by mouth once daily.   losartan (Cozaar) 50 mg tablet 6/5/2024  No Yes   Sig: Take 1 tablet (50 mg) by mouth once daily.   pioglitazone (Actos) 15 mg tablet 6/5/2024  No Yes   Sig: Take 1 tablet (15 mg) by mouth once daily.   pravastatin (Pravachol) 40 mg tablet 6/5/2024  No Yes   Sig: Take 1 tablet (40 mg) by mouth once daily at bedtime.   pregabalin (Lyrica) 100 mg capsule 6/5/2024  No Yes   Sig: TAKE ONE CAPSULE BY MOUTH TWICE DAILY @9AM & 5PM      Facility-Administered Medications: None       Keena Flores

## 2024-06-07 NOTE — PROGRESS NOTES
06/07/24 1149   Discharge Planning   Living Arrangements Alone   Support Systems Family members   Assistance Needed 1 Assist with Walker   Type of Residence Skilled nursing facility   Home or Post Acute Services Post acute facilities (Rehab/SNF/etc)   Type of Post Acute Facility Services Skilled nursing   Patient expects to be discharged to: [facility]   Does the patient need discharge transport arranged? Yes   RoundTrip coordination needed? Yes   Has discharge transport been arranged? No   Financial Resource Strain   How hard is it for you to pay for the very basics like food, housing, medical care, and heating? Not hard   Housing Stability   In the last 12 months, was there a time when you were not able to pay the mortgage or rent on time? N   In the last 12 months, was there a time when you did not have a steady place to sleep or slept in a shelter (including now)? N   Transportation Needs   In the past 12 months, has lack of transportation kept you from medical appointments or from getting medications? no   Patient Choice   Provider Choice list and CMS website (https://medicare.gov/care-compare#search) for post-acute Quality and Resource Measure Data were provided and reviewed with: Patient   Patient / Family choosing to utilize agency / facility established prior to hospitalization Yes     Patient is from [facility] under Skilled, she would like to return when medically ready. she states she has been progressing with therapy and is 1 assist with a walker. Patient receives outpatient HD TTS. PT/ OT pending. Will request [facility] send referrals.      [facility] can accept patient, Auth needed.     [facility] verified that patient is active with [facility] T/Th/Sat.

## 2024-06-07 NOTE — CONSULTS
Nephrology Consult Note                                                                                                                                         Inpatient consult to Nephrology  Consult performed by: Pinky Christie DO  Consult ordered by: Ronald Grimes MD                                                                                                               HPI  Patient is a 79 y.o. female history of ESRD who is admitted to hospital status postelective lap chidi.  Patient has had a percutaneous chidi tube tube for a few weeks.  Nephrology consulted in view of ESRD.   Discussed with surgeon, had a prolonged procedure-lysis of adhesions subtotal cholecystectomy.  Patient denies shortness of breath.  She did receive 1.5 L of fluid in the OR.  She has not had much urine output overnight.  Patient has been at [facility] in [place] and has been on a TTS dialysis schedule.  Patient is still a little bit confused after surgery.    Arranged and seen on dialysis today      Past Medical History:   Diagnosis Date    Abnormal levels of other serum enzymes     Elevated liver enzymes    Acute kidney failure (CMS-HCC)     Anemia     CKD (chronic kidney disease)     COPD (chronic obstructive pulmonary disease) (Multi)     Coronary artery disease     Disease of blood and blood-forming organs, unspecified     Bone marrow disorder    HLD (hyperlipidemia)     Hypertension     MDS (myelodysplastic syndrome) (Multi)     Personal history of other diseases of the musculoskeletal system and connective tissue     History of muscle pain    Personal history of other specified conditions     History of insomnia    Personal history of other specified conditions     History of edema    Type 2 diabetes mellitus (Multi)       Social History     Socioeconomic History    Marital status:       Spouse name: Not on file    Number of children: Not on file    Years of education: Not on file    Highest education level: Not on file   Occupational History    Not on file   Tobacco Use    Smoking status: Never    Smokeless tobacco: Never   Vaping Use    Vaping status: Never Used   Substance and Sexual Activity    Alcohol use: Not Currently    Drug use: Never    Sexual activity: Not Currently   Other Topics Concern    Not on file   Social History Narrative    Not on file     Social Determinants of Health     Financial Resource Strain: Low Risk  (6/6/2024)    Overall Financial Resource Strain (CARDIA)     Difficulty of Paying Living Expenses: Not hard at all   Food Insecurity: No Food Insecurity (6/6/2024)    Hunger Vital Sign     Worried About Running Out of Food in the Last Year: Never true     Ran Out of Food in the Last Year: Never true   Transportation Needs: No Transportation Needs (6/6/2024)    PRAPARE - Transportation     Lack of Transportation (Medical): No     Lack of Transportation (Non-Medical): No   Physical Activity: Not on file   Stress: Not on file   Social Connections: Feeling Somewhat Isolated (2/20/2024)    OASIS : Social Isolation     Frequency of experiencing loneliness or isolation: Sometimes   Intimate Partner Violence: Not on file   Housing Stability: Low Risk  (6/6/2024)    Housing Stability Vital Sign     Unable to Pay for Housing in the Last Year: No     Number of Places Lived in the Last Year: 1     Unstable Housing in the Last Year: No      No family history on file.   No current facility-administered medications on file prior to encounter.     Current Outpatient Medications on File Prior to Encounter   Medication Sig Dispense Refill    amLODIPine (Norvasc) 10 mg tablet Take 1 tablet (10 mg) by mouth once daily.      carvedilol (Coreg) 12.5 mg tablet Take 1 tablet (12.5 mg) by mouth 2 times a day.      cholecalciferol (Vitamin D-3) 50 MCG (2000 UT) tablet Take 1 tablet (2,000  "Units) by mouth once daily.      cyanocobalamin (Vitamin B-12) 1,000 mcg tablet Take 1 tablet (1,000 mcg) by mouth once daily.      losartan (Cozaar) 50 mg tablet Take 1 tablet (50 mg) by mouth once daily.      pioglitazone (Actos) 15 mg tablet Take 1 tablet (15 mg) by mouth once daily. 30 tablet 3    pravastatin (Pravachol) 40 mg tablet Take 1 tablet (40 mg) by mouth once daily at bedtime. 90 tablet 1    pregabalin (Lyrica) 100 mg capsule TAKE ONE CAPSULE BY MOUTH TWICE DAILY @9AM & 5PM 6 capsule 0    SITagliptin phosphate (Januvia) 25 mg tablet Take 1 tablet (25 mg) by mouth once daily. 30 tablet 3    [DISCONTINUED] amoxicillin-pot clavulanate (Augmentin) 500-125 mg tablet Take 1 tablet by mouth 2 times a day. Stop date 6/6/24 (Patient not taking: Reported on 6/6/2024)        Scheduled medications  amLODIPine, 10 mg, oral, Daily  carvedilol, 12.5 mg, oral, BID  cholecalciferol, 2,000 Units, oral, Daily  ciprofloxacin, 400 mg, intravenous, Daily  cyanocobalamin, 1,000 mcg, oral, Daily  heparin (porcine), 5,000 Units, subcutaneous, q8h  insulin lispro, 0-5 Units, subcutaneous, q4h  losartan, 50 mg, oral, Daily  magnesium sulfate, 2 g, intravenous, Once  metroNIDAZOLE, 500 mg, intravenous, q8h  oxygen, , inhalation, Continuous - Inhalation  pravastatin, 40 mg, oral, Nightly  pregabalin, 100 mg, oral, BID      Continuous medications     PRN medications  PRN medications: acetaminophen, dextrose, dextrose, glucagon, glucagon, HYDROmorphone, ondansetron, oxyCODONE, oxyCODONE     Review of systems  as per HPI otherwise 10 point review systems negative    /62   Pulse 84   Temp 37.4 °C (99.3 °F) (Temporal)   Resp 16   Ht 1.549 m (5' 0.98\")   Wt 76.6 kg (168 lb 14 oz)   SpO2 99%   BMI 31.92 kg/m²     Input / Output:  24 HR:   Intake/Output Summary (Last 24 hours) at 6/7/2024 1005  Last data filed at 6/7/2024 0938  Gross per 24 hour   Intake 919.17 ml   Output 85 ml   Net 834.17 ml       Physical Exam   Alert and " oriented x 3, NAD  Has some facial edema-  EOMI  OP clear  Neck: supple, No JVD  Right IJ TDC  CV: RRR without m/r/g  Lungs: CTA bilaterally  Abd: soft NT/ND +BS  Ext: no lower extremity edema   : External cardona  Neuro: grossly intact  Skin: no rashes    Results from last 7 days   Lab Units 06/07/24  0419 06/06/24  1521 06/06/24  0651   SODIUM mmol/L 139 137 137   POTASSIUM mmol/L 5.3 5.9* 4.5   CHLORIDE mmol/L 110* 106 102   CO2 mmol/L 21 23 28   BUN mg/dL 36* 33* 30*   CREATININE mg/dL 2.22* 2.17* 2.10*   GLUCOSE mg/dL 152* 226* 153*   CALCIUM mg/dL 7.5* 8.3* 9.8        Results from last 7 days   Lab Units 06/07/24  0419   SODIUM mmol/L 139   POTASSIUM mmol/L 5.3   CHLORIDE mmol/L 110*   CO2 mmol/L 21   BUN mg/dL 36*   CREATININE mg/dL 2.22*   CALCIUM mg/dL 7.5*   PROTEIN TOTAL g/dL 4.7*   BILIRUBIN TOTAL mg/dL 0.5   ALK PHOS U/L 93   ALT U/L 34   AST U/L 25   GLUCOSE mg/dL 152*      Results from last 7 days   Lab Units 06/07/24  0419 06/06/24  1521 06/06/24  0651   MAGNESIUM mg/dL 1.37* 1.49* 1.67      Results from last 7 days   Lab Units 06/07/24  0419 06/06/24  1521 06/06/24  0651   WBC AUTO x10*3/uL 10.6 9.5 5.2   HEMOGLOBIN g/dL 6.6* 8.3* 8.4*   HEMATOCRIT % 20.4* 25.8* 25.9*   PLATELETS AUTO x10*3/uL 256 265 259        No orders to display        Assessment:   Patient is 79 y.o. female with ESRD who is admitted to hospital for elective lap cholecystectomy. Nephrology consulted in view of ESRD.    ESRD  -Patient was on Monday Wednesday Friday at [facility] but has been in rehab in [place] and on TTS schedule there.      Anemia of ESRD and myelodysplastic syndrome  She is on MARILYNN with dialysis  She requires fairly frequent PRBC infusion    CKD-MBD  -Patient is currently not on phosphorus binder    Recommendations:   -Hemodialysis today, UF 1 L as blood pressure tolerates  -Plan hemodialysis again tomorrow to get back on her TTS schedule that she has at [facility]  -PRBCs with dialysis today      Please message me  through [employer] chat with any questions or concerns.     Pinky Christie DO  6/7/2024  10:05 AM         [facility]    [address], [address]   [place] [address]  Office: [phone]

## 2024-06-07 NOTE — PROGRESS NOTES
Occupational Therapy                 Therapy Communication Note    Patient Name: Tana Hargrove  MRN: [id number]  Today's Date: 6/7/2024     Discipline: Occupational Therapy    Missed Visit Reason: Missed Visit Reason: Patient refused (Pt declined therapy agreeable to attempt tomorrow.)    Missed Time: Attempt    Comment:

## 2024-06-07 NOTE — PROGRESS NOTES
Physical Therapy    Therapy Communication Note     Patient Name: Tana Hargrove  MRN: [id number]  Today's Date: 6/7/2024      Discipline: Physical Therapy     Missed Visit Reason: Missed Visit Reason: Patient refused (Pt declined intervention on this date, was agreeable to attempt once feeling less fatigued tomorrow.)     Missed Time: Attempt     LIDIA CORTEZPT

## 2024-06-07 NOTE — PROCEDURES
Report to Receiving RN:    Report To: Devan Dahl RN  Time Report Called: [id number]  Hand-Off Communication:   Pt tolerated tx well. Removed 1L  Post vitals: 122/59 (85) - 76.7 kg - 98.9*F  Complications During Treatment: No  Ultrafiltration Treatment: No  Medications Administered During Dialysis: See EMAR  Blood Products Administered During Dialysis: Yes  Labs Sent During Dialysis: Yes, Post HD  Heparin Drip Rate Changes: N/A  Dialysis Catheter Dressing: Changed, dressing was dated 06/07/2024 and initialed TE  Last Dressing Change: 06/07/2024    Electronic Signatures:  Macey Waller OCDT    Last Updated: 6:18 PM by MACEY WALLER

## 2024-06-07 NOTE — PROCEDURES
Report from Sending RN:    Report From: Devan Dahl RN  Recent Surgery of Procedure: Yes  Baseline Level of Consciousness (LOC): x4  Oxygen Use: Yes  Type: NC  Diabetic: Yes  Last BP Med Given Day of Dialysis:   See EMR  Last Pain Med Given: See EMAR  Lab Tests to be Obtained with Dialysis: No  Blood Transfusion to be Given During Dialysis: Yes  Available IV Access: Yes  Medications to be Administered During Dialysis: No  Continuous IV Infusion Running: No  Restraints on Currently or in the Last 24 Hours: No  Hand-Off Communication: Will start blood transfusion at start of tx  Dialysis Catheter Dressing: Will check prior to tx  Last Dressing Change: Will check prior to tx

## 2024-06-07 NOTE — PROGRESS NOTES
Physical Therapy                 Therapy Communication Note    Patient Name: Tana Hargrove  MRN: [id number]  Today's Date: 6/7/2024     Discipline: Physical Therapy    Missed Visit Reason: Missed Visit Reason: Patient in a medical procedure (dialysis)    Missed Time: Attempt    Comment:

## 2024-06-07 NOTE — CONSULTS
Nutrition Initial Assessment:   Nutrition Assessment    Reason for Assessment: Provider consult order    Medical history per chart:   79 y.o. female with PMHx s/f CAD, HTN, COPD, ESRD on HD via permacath, DM2, MDS, gout, neuropathy presenting for a fall.   Per surgery patient presenting with chronic cholecystitis managed with a percutaneous cholecystostomy tube here for definitive removal of her GB and drain.     6/7:  Pt is s/p partial fenestrated cholecystectomy, and was started on Clear liquids today.  Pt seen in ICU with some confusion, and s/p partial fenestrated cholecystectomy.  Pt tolerating sips of clears.  Pt agreeable to supplements as ordered.      Nutrition History:  Food and Nutrient History: Decreased PO intake prior to admit, patient known from previous admit       Current Diet: Adult diet Clear Liquid      Nutrition Related Findings:    Teeth: Dentures upper, Dentures lower   Wound Type: surgical (nursing/wound notes provide further details)    Food allergies: NKFA. is allergic to codeine, hydrocodone, hydrocodone-acetaminophen, meperidine (pf), tramadol, piperacillin-tazobactam, adhesive tape-silicones, and meperidine.  Meds/Labs reviewed.  amLODIPine, 10 mg, oral, Daily  ascorbic acid, 500 mg, oral, Daily  carvedilol, 12.5 mg, oral, BID  cholecalciferol, 2,000 Units, oral, Daily  ciprofloxacin, 400 mg, intravenous, Daily  cyanocobalamin, 1,000 mcg, oral, Daily  heparin (porcine), 5,000 Units, subcutaneous, q8h  heparin, 2,000 Units, intra-catheter, After Dialysis  heparin, 2,000 Units, intra-catheter, After Dialysis  insulin lispro, 0-10 Units, subcutaneous, TID  losartan, 50 mg, oral, Daily  metroNIDAZOLE, 500 mg, intravenous, q8h  multivitamin with minerals, 1 tablet, oral, Daily  oxygen, , inhalation, Continuous - Inhalation  pravastatin, 40 mg, oral, Nightly  pregabalin, 100 mg, oral, BID  zinc sulfate, 50 mg of elemental zinc, oral, Daily             Nutrition Significant Labs:    Results  "from last 7 days   Lab Units 06/07/24  0419 06/06/24  1521 06/06/24  0651   GLUCOSE mg/dL 152* 226* 153*   SODIUM mmol/L 139 137 137   POTASSIUM mmol/L 5.3 5.9* 4.5   CHLORIDE mmol/L 110* 106 102   CO2 mmol/L 21 23 28   BUN mg/dL 36* 33* 30*   CREATININE mg/dL 2.22* 2.17* 2.10*   EGFR mL/min/1.73m*2 22* 23* 23*   CALCIUM mg/dL 7.5* 8.3* 9.8   PHOSPHORUS mg/dL 4.3 4.8 4.4   MAGNESIUM mg/dL 1.37* 1.49* 1.67     Lab Results   Component Value Date    HGBA1C 6.3 (H) 03/14/2024    HGBA1C 6.6 (H) 02/26/2024     Results from last 7 days   Lab Units 06/07/24  1206 06/07/24  0821 06/07/24  0426 06/07/24  0003 06/06/24  1927 06/06/24  1624 06/06/24  1428   POCT GLUCOSE mg/dL 134* 153* 134* 159* 197* 213* 220*       Anthropometrics:  Height: 154.9 cm (5' 0.98\")   Weight: 76.6 kg (168 lb 14 oz)   BMI (Calculated): 31.92  IBW/kg (Dietitian Calculated): 47.7 kg          Weight History:   Wt Readings from Last 10 Encounters:   06/07/24 76.6 kg (168 lb 14 oz)   05/28/24 71.9 kg (158 lb 8.2 oz)   05/14/24 69.7 kg (153 lb 9.6 oz)   04/22/24 68 kg (150 lb)   04/19/24 68 kg (149 lb 14.6 oz)   04/16/24 69.4 kg (153 lb)   04/13/24 67.7 kg (149 lb 4 oz)   03/25/24 66.7 kg (147 lb)   03/05/24 67.1 kg (147 lb 14.9 oz)   02/14/24 75.7 kg (166 lb 14.2 oz)        Weight Change %:  Weight History / % Weight Change: Variable weights per available history, likely associated with fluid changes (dialysis), patient reports UBW is ~157 lbs.  Significant Weight Loss: No          Nutrition Focused Physical Exam Findings:    Subcutaneous Fat Loss:   Orbital Fat Pads: Mild-Moderate (slight dark circles and slight hollowing)  Buccal Fat Pads: Mild-Moderate (flat cheeks, minimal bounce)  Muscle Wasting:  Temporalis: Mild-Moderate (slight depression)  Edema:     Physical Findings:  Skin: Positive (Surgical incision)    Estimated Needs:   Total Energy Estimated Needs (kCal): 1431 kCal  Method for Estimating Needs: 30 kcal/kg IBW     Method for Estimating " Needs: 65-75 gm (1.3-1.5 gm/kg IBW)     Method for Estimating Needs: per physician        Nutrition Diagnosis   Nutrition Diagnosis:  Malnutrition Diagnosis  Patient has Malnutrition Diagnosis:  (Unable to determine)    Nutrition Diagnosis  Patient has Nutrition Diagnosis: Yes  Diagnosis Status (1): New  Nutrition Diagnosis 1: Increased nutrient needs  Related to (1): wound healing, known losses from dialysis, and s/p surgery  As Evidenced by (1): surgical incision,and foot wounds       Nutrition Interventions/Recommendations   Nutrition Interventions and Recommendations:        Nutrition Prescription:  Individualized Nutrition Prescription Provided for : Supplements with meals        Nutrition Interventions:   Food and/or Nutrient Delivery Interventions  Interventions: Medical food supplement  Goal: Advance diet as tolerated to Regular  Medical Food Supplement: Commercial beverage  Goal: Strawberry Glucerna shakes TID    Coordination of Nutrition Care by a Nutrition Professional  Collaboration and Referral of Nutrition Care: Other (Comment)  Goal: RN    Nutrition Education:   Education Documentation  No documentation found.      Nutrition Counseling  Counseling Theoretical Approach: Nutrition counseling based on health belief model  Goal: Reviewed supplements, and current diet order.  Patient with some confusion       Nutrition Monitoring and Evaluation   Monitoring/Evaluation:   Food/Nutrient Related History Monitoring  Monitoring and Evaluation Plan: Energy intake  Energy Intake: Estimated energy intake  Criteria: PO and supplement intake >75%  Additional Plans: Diet advancement as tolerated    Body Composition/Growth/Weight History  Monitoring and Evaluation Plan: Weight  Weight: Measured weight, Estimated dry weight  Criteria: Stable weight    Biochemical Data, Medical Tests and Procedures  Monitoring and Evaluation Plan: Electrolyte/renal panel, Glucose/endocrine profile  Electrolyte and Renal Panel: BUN,  Creatinine, Phosphorus, Potassium, Sodium  Criteria: WNL  Glucose/Endocrine Profile: Glucose, casual, Hemoglobin A1c (HgbA1c)  Criteria: WNL    Nutrition Focused Physical Findings  Monitoring and Evaluation Plan: Skin  Skin: Impaired wound healing  Criteria: Promote wound healing            Time Spent/Follow-up Reminder:   Follow Up  Time Spent (min): 45 minutes  Last Date of Nutrition Visit: 06/07/24  Nutrition Follow-Up Needed?: Dietitian to reassess per policy  Follow up Comment: 6/11-6/12

## 2024-06-07 NOTE — PROGRESS NOTES
"Tana Hargrove is a 79 y.o. female on day 1 of admission presenting with Chronic cholecystitis.    Subjective   Tolerating sips.  Currently getting HD.  Denies abd pain.  No flatus.         Objective   Heart Rate:  [61-93]   Temp:  [36.1 °C (97 °F)-38.3 °C (100.9 °F)]   Resp:  [10-27]   BP: (118-161)/(48-84)   Height:  [154.9 cm (5' 0.98\")]   Weight:  [71.9 kg (158 lb 8.2 oz)-76.6 kg (168 lb 14 oz)]   SpO2:  [83 %-100 %]   I/O last 3 completed shifts:  In: 719.2 (9.4 mL/kg) [I.V.:319.2 (4.2 mL/kg); IV Piggyback:400]  Out: 85 (1.1 mL/kg) [Drains:35; Blood:50]  Weight: 76.6 kg   I/O this shift:  In: 525 [I.V.:250; Blood:275]  Out: -   Physical Exam  Vitals reviewed.   Cardiovascular:      Rate and Rhythm: Normal rate.   Pulmonary:      Effort: Pulmonary effort is normal.   Abdominal:      Palpations: Abdomen is soft.      Comments: Incisions c/d/I  RUQ JANE drain with minimal output   Skin:     Capillary Refill: Capillary refill takes less than 2 seconds.   Neurological:      General: No focal deficit present.      Mental Status: She is alert.   Psychiatric:         Mood and Affect: Mood normal.       Results for orders placed or performed during the hospital encounter of 06/06/24 (from the past 24 hour(s))   POCT GLUCOSE   Result Value Ref Range    POCT Glucose 220 (H) 74 - 99 mg/dL   CBC and Auto Differential   Result Value Ref Range    WBC 9.5 4.4 - 11.3 x10*3/uL    nRBC 0.0 0.0 - 0.0 /100 WBCs    RBC 2.78 (L) 4.00 - 5.20 x10*6/uL    Hemoglobin 8.3 (L) 12.0 - 16.0 g/dL    Hematocrit 25.8 (L) 36.0 - 46.0 %    MCV 93 80 - 100 fL    MCH 29.9 26.0 - 34.0 pg    MCHC 32.2 32.0 - 36.0 g/dL    RDW 22.9 (H) 11.5 - 14.5 %    Platelets 265 150 - 450 x10*3/uL    Neutrophils % 79.9 40.0 - 80.0 %    Immature Granulocytes %, Automated 0.4 0.0 - 0.9 %    Lymphocytes % 11.2 13.0 - 44.0 %    Monocytes % 7.8 2.0 - 10.0 %    Eosinophils % 0.4 0.0 - 6.0 %    Basophils % 0.3 0.0 - 2.0 %    Neutrophils Absolute 7.60 (H) 1.60 - 5.50 " x10*3/uL    Immature Granulocytes Absolute, Automated 0.04 0.00 - 0.50 x10*3/uL    Lymphocytes Absolute 1.07 0.80 - 3.00 x10*3/uL    Monocytes Absolute 0.74 0.05 - 0.80 x10*3/uL    Eosinophils Absolute 0.04 0.00 - 0.40 x10*3/uL    Basophils Absolute 0.03 0.00 - 0.10 x10*3/uL   Phosphorus   Result Value Ref Range    Phosphorus 4.8 2.5 - 4.9 mg/dL   Magnesium   Result Value Ref Range    Magnesium 1.49 (L) 1.60 - 2.40 mg/dL   Basic Metabolic Panel   Result Value Ref Range    Glucose 226 (H) 74 - 99 mg/dL    Sodium 137 136 - 145 mmol/L    Potassium 5.9 (H) 3.5 - 5.3 mmol/L    Chloride 106 98 - 107 mmol/L    Bicarbonate 23 21 - 32 mmol/L    Anion Gap 14 10 - 20 mmol/L    Urea Nitrogen 33 (H) 6 - 23 mg/dL    Creatinine 2.17 (H) 0.50 - 1.05 mg/dL    eGFR 23 (L) >60 mL/min/1.73m*2    Calcium 8.3 (L) 8.6 - 10.3 mg/dL   Troponin I, High Sensitivity   Result Value Ref Range    Troponin I, High Sensitivity 9 0 - 13 ng/L   Morphology   Result Value Ref Range    RBC Morphology See Below     Ovalocytes Few    POCT GLUCOSE   Result Value Ref Range    POCT Glucose 213 (H) 74 - 99 mg/dL   POCT GLUCOSE   Result Value Ref Range    POCT Glucose 197 (H) 74 - 99 mg/dL   ECG 12 Lead   Result Value Ref Range    Ventricular Rate 67 BPM    Atrial Rate 67 BPM    WV Interval 214 ms    QRS Duration 96 ms    QT Interval 468 ms    QTC Calculation(Bazett) 494 ms    P Axis 58 degrees    R Axis 19 degrees    T Axis 59 degrees    QRS Count 11 beats    Q Onset 251 ms    T Offset 485 ms    QTC Fredericia 485 ms   POCT GLUCOSE   Result Value Ref Range    POCT Glucose 159 (H) 74 - 99 mg/dL   CBC and Auto Differential   Result Value Ref Range    WBC 10.6 4.4 - 11.3 x10*3/uL    nRBC 0.0 0.0 - 0.0 /100 WBCs    RBC 2.22 (L) 4.00 - 5.20 x10*6/uL    Hemoglobin 6.6 (L) 12.0 - 16.0 g/dL    Hematocrit 20.4 (L) 36.0 - 46.0 %    MCV 92 80 - 100 fL    MCH 29.7 26.0 - 34.0 pg    MCHC 32.4 32.0 - 36.0 g/dL    RDW 23.7 (H) 11.5 - 14.5 %    Platelets 256 150 - 450  x10*3/uL    Neutrophils % 85.0 40.0 - 80.0 %    Immature Granulocytes %, Automated 0.7 0.0 - 0.9 %    Lymphocytes % 6.9 13.0 - 44.0 %    Monocytes % 7.3 2.0 - 10.0 %    Eosinophils % 0.0 0.0 - 6.0 %    Basophils % 0.1 0.0 - 2.0 %    Neutrophils Absolute 8.99 (H) 1.60 - 5.50 x10*3/uL    Immature Granulocytes Absolute, Automated 0.07 0.00 - 0.50 x10*3/uL    Lymphocytes Absolute 0.73 (L) 0.80 - 3.00 x10*3/uL    Monocytes Absolute 0.77 0.05 - 0.80 x10*3/uL    Eosinophils Absolute 0.00 0.00 - 0.40 x10*3/uL    Basophils Absolute 0.01 0.00 - 0.10 x10*3/uL   Magnesium   Result Value Ref Range    Magnesium 1.37 (L) 1.60 - 2.40 mg/dL   Hepatic function panel   Result Value Ref Range    Albumin 2.9 (L) 3.4 - 5.0 g/dL    Bilirubin, Total 0.5 0.0 - 1.2 mg/dL    Bilirubin, Direct 0.0 0.0 - 0.3 mg/dL    Alkaline Phosphatase 93 33 - 136 U/L    ALT 34 7 - 45 U/L    AST 25 9 - 39 U/L    Total Protein 4.7 (L) 6.4 - 8.2 g/dL   Lipase   Result Value Ref Range    Lipase 19 9 - 82 U/L   Prealbumin   Result Value Ref Range    Prealbumin 18.8 18.0 - 40.0 mg/dL   Phosphorus   Result Value Ref Range    Phosphorus 4.3 2.5 - 4.9 mg/dL   Basic Metabolic Panel   Result Value Ref Range    Glucose 152 (H) 74 - 99 mg/dL    Sodium 139 136 - 145 mmol/L    Potassium 5.3 3.5 - 5.3 mmol/L    Chloride 110 (H) 98 - 107 mmol/L    Bicarbonate 21 21 - 32 mmol/L    Anion Gap 13 10 - 20 mmol/L    Urea Nitrogen 36 (H) 6 - 23 mg/dL    Creatinine 2.22 (H) 0.50 - 1.05 mg/dL    eGFR 22 (L) >60 mL/min/1.73m*2    Calcium 7.5 (L) 8.6 - 10.3 mg/dL   Morphology   Result Value Ref Range    RBC Morphology See Below     Hypochromia Mild     Ovalocytes Few    POCT GLUCOSE   Result Value Ref Range    POCT Glucose 134 (H) 74 - 99 mg/dL   POCT GLUCOSE   Result Value Ref Range    POCT Glucose 153 (H) 74 - 99 mg/dL   POCT GLUCOSE   Result Value Ref Range    POCT Glucose 134 (H) 74 - 99 mg/dL     *Note: Due to a large number of results and/or encounters for the requested time  period, some results have not been displayed. A complete set of results can be found in Results Review.           Assessment/Plan   Principal Problem:    Chronic cholecystitis  79F POD2 s/p lap partial fenestrated cholecystectomy doing expectedly likely haemodilunational H/H   - started on clrs this AM  - add MVI, zinc, Vit C, protein shakes  - nutrition consult for recs on supplements  - transfer floor with tele    I spent 45 minutes in the professional and overall care of this patient.      Ronald Grimes MD

## 2024-06-07 NOTE — PROGRESS NOTES
Social work consult placed for positive medical risk screen. SW reviewed pt's chart and communicated with T[facility]. No SW needs foreseen at this time. SW signing off; available upon request.    GISELLE Magallanes, LSW ([id number])   Care Transitions

## 2024-06-08 ENCOUNTER — APPOINTMENT (OUTPATIENT)
Dept: DIALYSIS | Facility: HOSPITAL | Age: 79
End: 2024-06-08
Payer: MEDICARE

## 2024-06-08 ENCOUNTER — APPOINTMENT (OUTPATIENT)
Dept: RADIOLOGY | Facility: HOSPITAL | Age: 79
DRG: 417 | End: 2024-06-08
Payer: MEDICARE

## 2024-06-08 ENCOUNTER — APPOINTMENT (OUTPATIENT)
Dept: CARDIOLOGY | Facility: HOSPITAL | Age: 79
DRG: 417 | End: 2024-06-08
Payer: MEDICARE

## 2024-06-08 PROBLEM — I48.91 ATRIAL FIBRILLATION WITH RAPID VENTRICULAR RESPONSE (MULTI): Status: ACTIVE | Noted: 2024-06-08

## 2024-06-08 LAB
ANION GAP SERPL CALC-SCNC: 8 MMOL/L (ref 10–20)
ANION GAP SERPL CALC-SCNC: 9 MMOL/L (ref 10–20)
BASO STIPL BLD QL SMEAR: PRESENT
BASO STIPL BLD QL SMEAR: PRESENT
BASOPHILS # BLD AUTO: 0.01 X10*3/UL (ref 0–0.1)
BASOPHILS # BLD AUTO: 0.02 X10*3/UL (ref 0–0.1)
BASOPHILS NFR BLD AUTO: 0.1 %
BASOPHILS NFR BLD AUTO: 0.3 %
BUN SERPL-MCNC: 11 MG/DL (ref 6–23)
BUN SERPL-MCNC: 21 MG/DL (ref 6–23)
CALCIUM SERPL-MCNC: 8.7 MG/DL (ref 8.6–10.3)
CALCIUM SERPL-MCNC: 8.9 MG/DL (ref 8.6–10.3)
CARDIAC TROPONIN I PNL SERPL HS: 13 NG/L (ref 0–13)
CHLORIDE SERPL-SCNC: 102 MMOL/L (ref 98–107)
CHLORIDE SERPL-SCNC: 103 MMOL/L (ref 98–107)
CO2 SERPL-SCNC: 30 MMOL/L (ref 21–32)
CO2 SERPL-SCNC: 30 MMOL/L (ref 21–32)
CREAT SERPL-MCNC: 1.2 MG/DL (ref 0.5–1.05)
CREAT SERPL-MCNC: 1.92 MG/DL (ref 0.5–1.05)
DACRYOCYTES BLD QL SMEAR: NORMAL
EGFRCR SERPLBLD CKD-EPI 2021: 26 ML/MIN/1.73M*2
EGFRCR SERPLBLD CKD-EPI 2021: 46 ML/MIN/1.73M*2
EOSINOPHIL # BLD AUTO: 0.03 X10*3/UL (ref 0–0.4)
EOSINOPHIL # BLD AUTO: 0.08 X10*3/UL (ref 0–0.4)
EOSINOPHIL NFR BLD AUTO: 0.4 %
EOSINOPHIL NFR BLD AUTO: 1.2 %
ERYTHROCYTE [DISTWIDTH] IN BLOOD BY AUTOMATED COUNT: 22.5 % (ref 11.5–14.5)
ERYTHROCYTE [DISTWIDTH] IN BLOOD BY AUTOMATED COUNT: 22.9 % (ref 11.5–14.5)
GLUCOSE BLD MANUAL STRIP-MCNC: 131 MG/DL (ref 74–99)
GLUCOSE BLD MANUAL STRIP-MCNC: 144 MG/DL (ref 74–99)
GLUCOSE BLD MANUAL STRIP-MCNC: 145 MG/DL (ref 74–99)
GLUCOSE BLD MANUAL STRIP-MCNC: 148 MG/DL (ref 74–99)
GLUCOSE SERPL-MCNC: 126 MG/DL (ref 74–99)
GLUCOSE SERPL-MCNC: 143 MG/DL (ref 74–99)
HCT VFR BLD AUTO: 21.3 % (ref 36–46)
HCT VFR BLD AUTO: 21.8 % (ref 36–46)
HGB BLD-MCNC: 6.9 G/DL (ref 12–16)
HGB BLD-MCNC: 7.2 G/DL (ref 12–16)
HYPOCHROMIA BLD QL SMEAR: NORMAL
HYPOCHROMIA BLD QL SMEAR: NORMAL
IMM GRANULOCYTES # BLD AUTO: 0.02 X10*3/UL (ref 0–0.5)
IMM GRANULOCYTES # BLD AUTO: 0.03 X10*3/UL (ref 0–0.5)
IMM GRANULOCYTES NFR BLD AUTO: 0.3 % (ref 0–0.9)
IMM GRANULOCYTES NFR BLD AUTO: 0.4 % (ref 0–0.9)
LYMPHOCYTES # BLD AUTO: 0.79 X10*3/UL (ref 0.8–3)
LYMPHOCYTES # BLD AUTO: 1.12 X10*3/UL (ref 0.8–3)
LYMPHOCYTES NFR BLD AUTO: 12 %
LYMPHOCYTES NFR BLD AUTO: 16.6 %
MAGNESIUM SERPL-MCNC: 1.68 MG/DL (ref 1.6–2.4)
MAGNESIUM SERPL-MCNC: 1.77 MG/DL (ref 1.6–2.4)
MCH RBC QN AUTO: 29.7 PG (ref 26–34)
MCH RBC QN AUTO: 30.3 PG (ref 26–34)
MCHC RBC AUTO-ENTMCNC: 32.4 G/DL (ref 32–36)
MCHC RBC AUTO-ENTMCNC: 33 G/DL (ref 32–36)
MCV RBC AUTO: 92 FL (ref 80–100)
MCV RBC AUTO: 92 FL (ref 80–100)
MONOCYTES # BLD AUTO: 0.61 X10*3/UL (ref 0.05–0.8)
MONOCYTES # BLD AUTO: 0.71 X10*3/UL (ref 0.05–0.8)
MONOCYTES NFR BLD AUTO: 10.5 %
MONOCYTES NFR BLD AUTO: 9.2 %
NEUTROPHILS # BLD AUTO: 4.83 X10*3/UL (ref 1.6–5.5)
NEUTROPHILS # BLD AUTO: 5.09 X10*3/UL (ref 1.6–5.5)
NEUTROPHILS NFR BLD AUTO: 72 %
NEUTROPHILS NFR BLD AUTO: 77 %
NRBC BLD-RTO: 0 /100 WBCS (ref 0–0)
NRBC BLD-RTO: 0.3 /100 WBCS (ref 0–0)
OVALOCYTES BLD QL SMEAR: NORMAL
OVALOCYTES BLD QL SMEAR: NORMAL
PHOSPHATE SERPL-MCNC: 1.9 MG/DL (ref 2.5–4.9)
PHOSPHATE SERPL-MCNC: 3.1 MG/DL (ref 2.5–4.9)
PLATELET # BLD AUTO: 208 X10*3/UL (ref 150–450)
PLATELET # BLD AUTO: 211 X10*3/UL (ref 150–450)
POLYCHROMASIA BLD QL SMEAR: NORMAL
POLYCHROMASIA BLD QL SMEAR: NORMAL
POTASSIUM SERPL-SCNC: 3.6 MMOL/L (ref 3.5–5.3)
POTASSIUM SERPL-SCNC: 4.3 MMOL/L (ref 3.5–5.3)
RBC # BLD AUTO: 2.32 X10*6/UL (ref 4–5.2)
RBC # BLD AUTO: 2.38 X10*6/UL (ref 4–5.2)
RBC MORPH BLD: NORMAL
RBC MORPH BLD: NORMAL
SODIUM SERPL-SCNC: 137 MMOL/L (ref 136–145)
SODIUM SERPL-SCNC: 137 MMOL/L (ref 136–145)
WBC # BLD AUTO: 6.6 X10*3/UL (ref 4.4–11.3)
WBC # BLD AUTO: 6.7 X10*3/UL (ref 4.4–11.3)

## 2024-06-08 PROCEDURE — 99223 1ST HOSP IP/OBS HIGH 75: CPT | Performed by: STUDENT IN AN ORGANIZED HEALTH CARE EDUCATION/TRAINING PROGRAM

## 2024-06-08 PROCEDURE — 8010000001 HC DIALYSIS - HEMODIALYSIS PER DAY

## 2024-06-08 PROCEDURE — 83735 ASSAY OF MAGNESIUM: CPT | Performed by: SURGERY

## 2024-06-08 PROCEDURE — 2500000005 HC RX 250 GENERAL PHARMACY W/O HCPCS: Performed by: SURGERY

## 2024-06-08 PROCEDURE — 36415 COLL VENOUS BLD VENIPUNCTURE: CPT | Performed by: SURGERY

## 2024-06-08 PROCEDURE — 1200000002 HC GENERAL ROOM WITH TELEMETRY DAILY

## 2024-06-08 PROCEDURE — 71045 X-RAY EXAM CHEST 1 VIEW: CPT | Performed by: RADIOLOGY

## 2024-06-08 PROCEDURE — 2500000004 HC RX 250 GENERAL PHARMACY W/ HCPCS (ALT 636 FOR OP/ED)

## 2024-06-08 PROCEDURE — 2500000004 HC RX 250 GENERAL PHARMACY W/ HCPCS (ALT 636 FOR OP/ED): Performed by: INTERNAL MEDICINE

## 2024-06-08 PROCEDURE — 71045 X-RAY EXAM CHEST 1 VIEW: CPT

## 2024-06-08 PROCEDURE — 82947 ASSAY GLUCOSE BLOOD QUANT: CPT | Mod: 91

## 2024-06-08 PROCEDURE — 85025 COMPLETE CBC W/AUTO DIFF WBC: CPT | Performed by: SURGERY

## 2024-06-08 PROCEDURE — 2500000005 HC RX 250 GENERAL PHARMACY W/O HCPCS

## 2024-06-08 PROCEDURE — 84484 ASSAY OF TROPONIN QUANT: CPT | Performed by: SURGERY

## 2024-06-08 PROCEDURE — 2500000001 HC RX 250 WO HCPCS SELF ADMINISTERED DRUGS (ALT 637 FOR MEDICARE OP): Performed by: SURGERY

## 2024-06-08 PROCEDURE — 97162 PT EVAL MOD COMPLEX 30 MIN: CPT | Mod: GP

## 2024-06-08 PROCEDURE — 84100 ASSAY OF PHOSPHORUS: CPT | Mod: 91 | Performed by: SURGERY

## 2024-06-08 PROCEDURE — P9040 RBC LEUKOREDUCED IRRADIATED: HCPCS

## 2024-06-08 PROCEDURE — 80048 BASIC METABOLIC PNL TOTAL CA: CPT | Performed by: SURGERY

## 2024-06-08 PROCEDURE — 99024 POSTOP FOLLOW-UP VISIT: CPT | Performed by: SURGERY

## 2024-06-08 PROCEDURE — 93005 ELECTROCARDIOGRAM TRACING: CPT

## 2024-06-08 PROCEDURE — 84100 ASSAY OF PHOSPHORUS: CPT | Performed by: SURGERY

## 2024-06-08 PROCEDURE — 93010 ELECTROCARDIOGRAM REPORT: CPT | Performed by: INTERNAL MEDICINE

## 2024-06-08 PROCEDURE — 80048 BASIC METABOLIC PNL TOTAL CA: CPT | Mod: 91 | Performed by: SURGERY

## 2024-06-08 PROCEDURE — 97166 OT EVAL MOD COMPLEX 45 MIN: CPT | Mod: GO | Performed by: OCCUPATIONAL THERAPIST

## 2024-06-08 PROCEDURE — 85025 COMPLETE CBC W/AUTO DIFF WBC: CPT | Mod: 91 | Performed by: SURGERY

## 2024-06-08 PROCEDURE — 2500000004 HC RX 250 GENERAL PHARMACY W/ HCPCS (ALT 636 FOR OP/ED): Performed by: SURGERY

## 2024-06-08 PROCEDURE — 83735 ASSAY OF MAGNESIUM: CPT | Mod: 91 | Performed by: SURGERY

## 2024-06-08 PROCEDURE — 36430 TRANSFUSION BLD/BLD COMPNT: CPT

## 2024-06-08 RX ORDER — METOPROLOL TARTRATE 1 MG/ML
5 INJECTION, SOLUTION INTRAVENOUS EVERY 4 HOURS PRN
Status: DISCONTINUED | OUTPATIENT
Start: 2024-06-08 | End: 2024-06-08

## 2024-06-08 RX ORDER — ACETAMINOPHEN 325 MG/1
975 TABLET ORAL EVERY 8 HOURS
Status: DISCONTINUED | OUTPATIENT
Start: 2024-06-08 | End: 2024-06-12

## 2024-06-08 RX ORDER — FOLIC ACID 1 MG/1
1 TABLET ORAL DAILY
Status: DISCONTINUED | OUTPATIENT
Start: 2024-06-09 | End: 2024-06-13 | Stop reason: HOSPADM

## 2024-06-08 RX ORDER — MAGNESIUM SULFATE HEPTAHYDRATE 40 MG/ML
2 INJECTION, SOLUTION INTRAVENOUS ONCE
Status: COMPLETED | OUTPATIENT
Start: 2024-06-08 | End: 2024-06-08

## 2024-06-08 RX ORDER — DILTIAZEM HCL/D5W 125 MG/125
5 PLASTIC BAG, INJECTION (ML) INTRAVENOUS CONTINUOUS
Status: DISCONTINUED | OUTPATIENT
Start: 2024-06-08 | End: 2024-06-09

## 2024-06-08 RX ADMIN — METRONIDAZOLE 500 MG: 500 INJECTION, SOLUTION INTRAVENOUS at 06:57

## 2024-06-08 RX ADMIN — ACETAMINOPHEN 975 MG: 325 TABLET ORAL at 17:11

## 2024-06-08 RX ADMIN — Medication 50 MG OF ELEMENTAL ZINC: at 10:25

## 2024-06-08 RX ADMIN — PREGABALIN 100 MG: 50 CAPSULE ORAL at 10:26

## 2024-06-08 RX ADMIN — Medication 5 MG/HR: at 18:05

## 2024-06-08 RX ADMIN — LOSARTAN POTASSIUM 50 MG: 50 TABLET, FILM COATED ORAL at 10:25

## 2024-06-08 RX ADMIN — POTASSIUM PHOSPHATE, MONOBASIC AND POTASSIUM PHOSPHATE, DIBASIC 21 MMOL: 224; 236 INJECTION, SOLUTION, CONCENTRATE INTRAVENOUS at 22:00

## 2024-06-08 RX ADMIN — HEPARIN SODIUM 5000 UNITS: 5000 INJECTION INTRAVENOUS; SUBCUTANEOUS at 10:27

## 2024-06-08 RX ADMIN — HEPARIN SODIUM 5000 UNITS: 5000 INJECTION INTRAVENOUS; SUBCUTANEOUS at 00:34

## 2024-06-08 RX ADMIN — MAGNESIUM SULFATE HEPTAHYDRATE 2 G: 40 INJECTION, SOLUTION INTRAVENOUS at 19:53

## 2024-06-08 RX ADMIN — HEPARIN SODIUM 5000 UNITS: 5000 INJECTION INTRAVENOUS; SUBCUTANEOUS at 17:11

## 2024-06-08 RX ADMIN — HEPARIN SODIUM 2000 UNITS: 1000 INJECTION INTRAVENOUS; SUBCUTANEOUS at 14:23

## 2024-06-08 RX ADMIN — CARVEDILOL 12.5 MG: 12.5 TABLET, FILM COATED ORAL at 20:28

## 2024-06-08 RX ADMIN — PREGABALIN 100 MG: 50 CAPSULE ORAL at 20:23

## 2024-06-08 RX ADMIN — CIPROFLOXACIN 400 MG: 400 INJECTION, SOLUTION INTRAVENOUS at 10:27

## 2024-06-08 RX ADMIN — PRAVASTATIN SODIUM 40 MG: 40 TABLET ORAL at 20:23

## 2024-06-08 RX ADMIN — CARVEDILOL 12.5 MG: 12.5 TABLET, FILM COATED ORAL at 10:25

## 2024-06-08 RX ADMIN — HEPARIN SODIUM 2000 UNITS: 1000 INJECTION INTRAVENOUS; SUBCUTANEOUS at 14:24

## 2024-06-08 RX ADMIN — METRONIDAZOLE 500 MG: 500 INJECTION, SOLUTION INTRAVENOUS at 19:54

## 2024-06-08 RX ADMIN — CYANOCOBALAMIN TAB 1000 MCG 1000 MCG: 1000 TAB at 10:26

## 2024-06-08 RX ADMIN — Medication 2 L/MIN: at 08:00

## 2024-06-08 RX ADMIN — AMLODIPINE BESYLATE 10 MG: 10 TABLET ORAL at 10:25

## 2024-06-08 RX ADMIN — ACETAMINOPHEN 650 MG: 325 TABLET ORAL at 06:56

## 2024-06-08 RX ADMIN — Medication 1 TABLET: at 10:27

## 2024-06-08 RX ADMIN — Medication 2 L/MIN: at 20:00

## 2024-06-08 RX ADMIN — Medication 2000 UNITS: at 10:26

## 2024-06-08 RX ADMIN — OXYCODONE HYDROCHLORIDE AND ACETAMINOPHEN 500 MG: 500 TABLET ORAL at 10:24

## 2024-06-08 ASSESSMENT — ENCOUNTER SYMPTOMS
FEVER: 0
CONSTIPATION: 0
EYE DISCHARGE: 0
NAUSEA: 0
SORE THROAT: 0
ABDOMINAL PAIN: 1
DIARRHEA: 0
COLOR CHANGE: 0
LIGHT-HEADEDNESS: 0
WEAKNESS: 1
DYSURIA: 0
COUGH: 0
VOMITING: 0
FREQUENCY: 0
ARTHRALGIAS: 0
BACK PAIN: 0
SHORTNESS OF BREATH: 0
CHILLS: 0
EYE REDNESS: 0
HEADACHES: 0

## 2024-06-08 ASSESSMENT — COGNITIVE AND FUNCTIONAL STATUS - GENERAL
TURNING FROM BACK TO SIDE WHILE IN FLAT BAD: A LOT
WALKING IN HOSPITAL ROOM: TOTAL
MOVING FROM LYING ON BACK TO SITTING ON SIDE OF FLAT BED WITH BEDRAILS: TOTAL
EATING MEALS: TOTAL
HELP NEEDED FOR BATHING: TOTAL
MOVING TO AND FROM BED TO CHAIR: TOTAL
MOBILITY SCORE: 6
CLIMB 3 TO 5 STEPS WITH RAILING: TOTAL
CLIMB 3 TO 5 STEPS WITH RAILING: TOTAL
DRESSING REGULAR UPPER BODY CLOTHING: A LOT
TURNING FROM BACK TO SIDE WHILE IN FLAT BAD: TOTAL
MOVING FROM LYING ON BACK TO SITTING ON SIDE OF FLAT BED WITH BEDRAILS: TOTAL
EATING MEALS: A LITTLE
DAILY ACTIVITIY SCORE: 6
TOILETING: TOTAL
EATING MEALS: A LITTLE
TURNING FROM BACK TO SIDE WHILE IN FLAT BAD: TOTAL
HELP NEEDED FOR BATHING: TOTAL
STANDING UP FROM CHAIR USING ARMS: TOTAL
WALKING IN HOSPITAL ROOM: TOTAL
DAILY ACTIVITIY SCORE: 10
CLIMB 3 TO 5 STEPS WITH RAILING: TOTAL
MOVING TO AND FROM BED TO CHAIR: TOTAL
PERSONAL GROOMING: A LOT
DRESSING REGULAR UPPER BODY CLOTHING: TOTAL
DRESSING REGULAR LOWER BODY CLOTHING: TOTAL
STANDING UP FROM CHAIR USING ARMS: TOTAL
MOBILITY SCORE: 8
TOILETING: TOTAL
DRESSING REGULAR LOWER BODY CLOTHING: TOTAL
MOVING TO AND FROM BED TO CHAIR: TOTAL
DRESSING REGULAR UPPER BODY CLOTHING: A LOT
DAILY ACTIVITIY SCORE: 10
HELP NEEDED FOR BATHING: TOTAL
TOILETING: TOTAL
MOBILITY SCORE: 6
WALKING IN HOSPITAL ROOM: TOTAL
STANDING UP FROM CHAIR USING ARMS: TOTAL
PERSONAL GROOMING: TOTAL
PERSONAL GROOMING: A LOT
DRESSING REGULAR LOWER BODY CLOTHING: TOTAL
MOVING FROM LYING ON BACK TO SITTING ON SIDE OF FLAT BED WITH BEDRAILS: A LOT

## 2024-06-08 ASSESSMENT — PAIN SCALES - GENERAL
PAINLEVEL_OUTOF10: 3
PAINLEVEL_OUTOF10: 0 - NO PAIN
PAINLEVEL_OUTOF10: 3
PAINLEVEL_OUTOF10: 1

## 2024-06-08 ASSESSMENT — PAIN - FUNCTIONAL ASSESSMENT
PAIN_FUNCTIONAL_ASSESSMENT: 0-10

## 2024-06-08 ASSESSMENT — ACTIVITIES OF DAILY LIVING (ADL)
ADL_ASSISTANCE: INDEPENDENT
ADL_ASSISTANCE: INDEPENDENT

## 2024-06-08 ASSESSMENT — PAIN DESCRIPTION - LOCATION: LOCATION: HEAD

## 2024-06-08 NOTE — PROGRESS NOTES
"GENERAL SURGERY PROGRESS NOTE    Tana Hargrove   1945   [id number]     Tana Hargrove is a 79 y.o. female on day 2 of admission presenting with Chronic cholecystitis.      Subjective  Pt JASWINDER. Very weak this morning, not having much PO intake 2/2 the weakness and general lack of appetite. Minimal nausea. Abdominal soreness.    Review of Systems   Constitutional:  Negative for chills and fever.   HENT:  Negative for congestion and sore throat.    Eyes:  Negative for discharge and redness.   Respiratory:  Negative for cough and shortness of breath.    Gastrointestinal:  Positive for abdominal pain. Negative for constipation, diarrhea, nausea and vomiting.   Endocrine: Negative for cold intolerance and heat intolerance.   Genitourinary:  Negative for dysuria and frequency.   Musculoskeletal:  Negative for arthralgias and back pain.   Skin:  Negative for color change and rash.   Neurological:  Positive for weakness. Negative for light-headedness and headaches.       Objective    Last Recorded Vitals  Blood pressure 136/64, pulse 85, temperature 36.3 °C (97.3 °F), temperature source Temporal, resp. rate 18, height 1.549 m (5' 0.98\"), weight 81.9 kg (180 lb 8.9 oz), SpO2 96%.    Intake/Output last 3 Shifts:  I/O last 3 completed shifts:  In: 1375 (16.8 mL/kg) [I.V.:450 (5.5 mL/kg); Blood:275; Other:400; IV Piggyback:250]  Out: 1892 (23.1 mL/kg) [Urine:450 (0.2 mL/kg/hr); Drains:37; Other:1405]  Weight: 81.9 kg     Gen: NAD.  A&Ox3. Weak and lethargic.  HEENT: NC/AT.  Moist mucous membranes.  Neck: Normal range of motion.  CV: Regular rate.  Chest: Normal chest rise.  Normal respiratory effort.  Abdomen: Soft.  RUQ minimal TTP. RUQ drain with scant serous output, slight bile tinge.  No rigidity or guarding.  Extremities: No edema.  Moving all extremities.    Relevant Results  Results for orders placed or performed during the hospital encounter of 06/06/24 (from the past 24 hour(s))   POCT GLUCOSE   Result Value Ref " Range    POCT Glucose 134 (H) 74 - 99 mg/dL   Hepatic function panel   Result Value Ref Range    Albumin 3.4 3.4 - 5.0 g/dL    Bilirubin, Total 1.0 0.0 - 1.2 mg/dL    Bilirubin, Direct 0.1 0.0 - 0.3 mg/dL    Alkaline Phosphatase 102 33 - 136 U/L    ALT 35 7 - 45 U/L    AST 24 9 - 39 U/L    Total Protein 5.6 (L) 6.4 - 8.2 g/dL   POCT GLUCOSE   Result Value Ref Range    POCT Glucose 181 (H) 74 - 99 mg/dL   POCT GLUCOSE   Result Value Ref Range    POCT Glucose 111 (H) 74 - 99 mg/dL   CBC and Auto Differential   Result Value Ref Range    WBC 6.7 4.4 - 11.3 x10*3/uL    nRBC 0.3 (H) 0.0 - 0.0 /100 WBCs    RBC 2.32 (L) 4.00 - 5.20 x10*6/uL    Hemoglobin 6.9 (L) 12.0 - 16.0 g/dL    Hematocrit 21.3 (L) 36.0 - 46.0 %    MCV 92 80 - 100 fL    MCH 29.7 26.0 - 34.0 pg    MCHC 32.4 32.0 - 36.0 g/dL    RDW 22.9 (H) 11.5 - 14.5 %    Platelets 208 150 - 450 x10*3/uL    Neutrophils % 72.0 40.0 - 80.0 %    Immature Granulocytes %, Automated 0.4 0.0 - 0.9 %    Lymphocytes % 16.6 13.0 - 44.0 %    Monocytes % 10.5 2.0 - 10.0 %    Eosinophils % 0.4 0.0 - 6.0 %    Basophils % 0.1 0.0 - 2.0 %    Neutrophils Absolute 4.83 1.60 - 5.50 x10*3/uL    Immature Granulocytes Absolute, Automated 0.03 0.00 - 0.50 x10*3/uL    Lymphocytes Absolute 1.12 0.80 - 3.00 x10*3/uL    Monocytes Absolute 0.71 0.05 - 0.80 x10*3/uL    Eosinophils Absolute 0.03 0.00 - 0.40 x10*3/uL    Basophils Absolute 0.01 0.00 - 0.10 x10*3/uL   Basic Metabolic Panel   Result Value Ref Range    Glucose 126 (H) 74 - 99 mg/dL    Sodium 137 136 - 145 mmol/L    Potassium 4.3 3.5 - 5.3 mmol/L    Chloride 103 98 - 107 mmol/L    Bicarbonate 30 21 - 32 mmol/L    Anion Gap 8 (L) 10 - 20 mmol/L    Urea Nitrogen 21 6 - 23 mg/dL    Creatinine 1.92 (H) 0.50 - 1.05 mg/dL    eGFR 26 (L) >60 mL/min/1.73m*2    Calcium 8.9 8.6 - 10.3 mg/dL   Magnesium   Result Value Ref Range    Magnesium 1.77 1.60 - 2.40 mg/dL   Phosphorus   Result Value Ref Range    Phosphorus 3.1 2.5 - 4.9 mg/dL   Morphology    Result Value Ref Range    RBC Morphology See Below     Polychromasia Mild     Hypochromia Mild     Ovalocytes Few     Teardrop Cells Few     Basophilic Stippling Present    POCT GLUCOSE   Result Value Ref Range    POCT Glucose 144 (H) 74 - 99 mg/dL     *Note: Due to a large number of results and/or encounters for the requested time period, some results have not been displayed. A complete set of results can be found in Results Review.       ECG 12 Lead    Result Date: 6/7/2024  Sinus rhythm Borderline prolonged [place] interval Borderline prolonged QT interval    ECG 12 lead    Result Date: 5/25/2024  Sinus rhythm Minimal ST depression, lateral leads Prolonged QT interval See ED provider note for full interpretation and clinical correlation Confirmed by Terri Bermudez ([id number]) on 5/25/2024 1:12:33 PM    US gallbladder    Result Date: 5/19/2024  STUDY: Right upper quadrant Ultrasound; 5/19/2024 4:28 PM INDICATION: Status post cholecystostomy, elevated LFTs. COMPARISON: CT A&P today, US gallbladder 4/18/2024, MR abdomen 4/18/2024. ACCESSION NUMBER(S): [id number] ORDERING CLINICIAN: CELIA WYNNE TECHNIQUE: Ultrasound of the right upper quadrant.  Multiple images of the right upper quadrant were obtained. FINDINGS: The liver demonstrates normal echogenicity.   The gallbladder contains sludge as well as multiple stones.  There is edematous wall thickening.  The cholecystectomy tube is visualized within the gallbladder.  Sonographic Luna's sign is negative.    The common bile duct measures 0.3 cm.  There is no intrahepatic biliary dilatation.   The pancreas is not well seen due to overlying bowel gas.  The right kidney measures 9.8 cm in length.  Renal cortical thinning is present and echotexture is increased.  There is no hydronephrosis. There are no stones.  There is a simple cyst measuring 1.2 x 1.0 x 0.8 cm within the midportion.    Sludge and stones within the gallbladder with edematous wall thickening.  No  biliary dilatation is appreciated. Increased echogenicity and thinning of the right renal cortex consistent with intrinsic renal disease.  No hydronephrosis. Signed by Lonnie Rodriguez MD    CT abdomen pelvis wo IV contrast    Result Date: 5/19/2024  STUDY: CT Abdomen and Pelvis without IV Contrast; 05/19/2024, 1:35 PM. INDICATION: Signs/Symptoms:Gallbladder surgery, elevated liver enzymes and lipase from baseline. COMPARISON: XR pelvis: 05/19/24, 04/23/24; IR Biliary: 04/27/24; MR abdomen: 04/14/24; US gallbladder: 04/18/24. ACCESSION NUMBER(S): [id number] ORDERING CLINICIAN: CELIA WYNNE TECHNIQUE: CT of the abdomen and pelvis was performed.  Contiguous axial images were obtained at 3 mm slice thickness through the abdomen and pelvis. Coronal and sagittal reconstructions at 3 mm slice thickness were performed. No intravenous contrast was administered.  Automated mA/kV exposure control was utilized and patient examination was performed in strict accordance with principles of ALARA. FINDINGS: Please note that the evaluation of vessels, lymph nodes and organs is limited without intravenous contrast.  LOWER CHEST: No cardiomegaly.  No pericardial effusion.  Atelectatic changes are noted.  Venous catheter terminates in the RIGHT atrium. Atherosclerosis of the thoracic aorta and coronary arteries is present.  ABDOMEN:  LIVER: No hepatomegaly.  Smooth surface contour.  Normal attenuation.  BILE DUCTS: No intrahepatic or extrahepatic biliary ductal dilatation.  GALLBLADDER: Percutaneously placed drainage tube noted within the maria isabel hepatis. This appears to be a colostomy tube with decompressed gallbladder versus tubing status post cholecystectomy with residual fluid within the gallbladder fossa.  Clinical correlation for operative history recommended. STOMACH: No abnormalities identified.  PANCREAS: No masses or ductal dilatation.  SPLEEN: No splenomegaly or focal splenic lesion.  ADRENAL GLANDS: No thickening or nodules.   KIDNEYS AND URETERS: Kidneys are normal in size and location.  No renal or ureteral calculi.  PELVIS:  BLADDER: No abnormalities identified.  REPRODUCTIVE ORGANS: Patient is status post hysterectomy. Scattered phleboliths are present within the pelvis.  BOWEL: No abnormalities identified.  VESSELS: No abnormalities identified.  Atherosclerosis of the abdominal aorta and iliac arteries is noted.    PERITONEUM/RETROPERITONEUM/LYMPH NODES: No free fluid.  No pneumoperitoneum. No lymphadenopathy.  ABDOMINAL WALL: No abnormalities identified.  Midline incision scar noted. SOFT TISSUES: No abnormalities identified.   BONES: Patient is status post RIGHT femoral ORIF.  Patient is status post multilevel posterior fusion of L2-L5.  Intervertebral disc spacers at L5-S1 also noted.  Dextroconvex scoliosis of the lumbar spine noted. Moderate degenerative change of the hips demonstrated. Senescent changes are present within the lumbar spine.  No acute fracture or aggressive osseous lesion.    Percutaneous drainage tube noted within the maria isabel hepatis. This appears to be related to a decompressed gallbladder versus positioning status post cholecystectomy with residual fluid within the gallbladder fossa. Clinical correlation for operative history recommended. Signed by Abhi Godoy II, MD    XR pelvis 1-2 views    Result Date: 5/19/2024  STUDY: Pelvis Radiographs; 5/19/2024 10:54AM INDICATION: Fall. COMPARISON: 4/23/2024 XR Pelvis. ACCESSION NUMBER(S): [id number] ORDERING CLINICIAN: CELIA WYNNE TECHNIQUE:  One view(s) of the pelvis. FINDINGS:  The pelvic ring is intact.  There is no acute fracture.  Postsurgical changes of the proximal right femur without evidence of hardware malfunction.  Mild degenerative change of both hips.    No acute osseous abnormality. Signed by Lonnie Rodriguez MD    XR chest 1 view    Result Date: 5/19/2024  STUDY: Chest Radiograph;  5/19/2024 10:54AM INDICATION: Fall. COMPARISON: 4/23/2024 XR Chest.  ACCESSION NUMBER(S): [id number] ORDERING CLINICIAN: CELIA WYNNE TECHNIQUE:  Frontal chest was obtained at 11:53 hours. FINDINGS: CARDIOMEDIASTINAL SILHOUETTE: Cardiomediastinal silhouette is normal in size and configuration.  LUNGS: Lungs are clear.  Right-sided dialysis catheter with tip near the cavoatrial junction.  Elevation of the right hemidiaphragm.  ABDOMEN: No remarkable upper abdominal findings.  BONES: No acute osseous changes.    No acute process. Signed by Lonnie Rodriguez MD      Assessment and Plan  Principal Problem:    Chronic cholecystitis    79 y.o. female with chronic cholecystitis s/p lap partial fenestrated cholecystecomy    - continue CLD as tolerated  - Continue MVI, zinc, Vit C, protein shakes  - Glucerna added per nutrition  - Patient needs to ambulate. Only pulling 300cc on IS. Remains on 4L O2. Discussed with nursing need for patient to be up in the chair today as much as possible.   - dialysis today per nephro    Discussed with Dr. Sydney Kohler, DO PGY2  General Surgery

## 2024-06-08 NOTE — CONSULTS
Inpatient consult to Medicine  Consult performed by: Rosa Calabrese PA-C  Consult ordered by: Ronald Grimes MD     [facility] General Medicine Consultation Note    ASSESSMENT and PLAN:     Tana Hargrove is a 79 y.o. female with a significant past medical history s/f HTN, HLD, CAD, paroxysmal atrial fibrillation (not on AC d/t anemia and need for recurrent blood transfusion), HFpEF, COPD (no baseline O2), NIDDM2, ESRD on HD, chronic anemia requiring Procrit and intermittent PRBC infusions (in setting of MDS and ESRD), OA, anxiety / depression, GERD, and chronic cholecystitis who presented 06/06/24 for laparoscopic fenestrated subtotal chidi I/s/o acute on chronic cholecystitis. Patient current on Hospital Day #2, being followed by surgery (primary) and nephrology (for HD). Medicine was consulted for perioperative medical management and assistance with management of atrial fibrillation with RVR, acute on chronic anemia. Cardiology also consulted on HD #2.      Atrial Fibrillation with RVR   -Likely multifactorial in acute on chronic anemia, stress from surgical intervention, electrolyte derangements (hypomagnesemia has improved, hypophosphatemia on most recent labs), etc  -Overall patient feels weak, has continued abdominal discomfort from her surgery, and poor appetite; however, she is not endorsing any chest pain, palpitations, dizziness or lightheadedness, diaphoresis, or symptoms consistent with her A-fib as she has had these since her postoperative period began  -Patient will be continued on a Cardizem drip (most recent echocardiogram without evidence of LV dysfunction or WMAs), can continue home medications as p.o. intake improves.  May utilize pushes of IV metoprolol as needed if HR remains elevated despite cardizem   -Continue to trend electrolytes closely, monitor on telemetry.  -Will check TSH in the morning as well out of abundance of caution  -Patient is not on oral anticoagulation for A-fib  in setting of her chronic anemia and need for blood transfusions  -Cardiology and critical care also consulted on this patient    Acute on chronic anemia, multifactorial in setting of baseline ESRD, MDS, recent surgery  -Patient's baseline hemoglobin varies and she does frequently require PRBC infusions and Procrit; most recently she was in the low sevens  -Earlier today, the patient's hemoglobin was 6.6 --> she went for dialysis and the plan per the surgery team was to recheck her H&H and transfuse as needed at a later time (given that she does get MARILYNN/PRBCs with dialysis); however, with interval development of A-fib RVR she was transfused 1 unit of blood  -There has been no overt evidence of bleeding per patient or nursing, including hemoptysis, hematemesis, melena, hematochezia, etc.  -Will continue to trend H&H  -Transfusion goal as per nephrology and cardiology, but would recommend trying to maintain at least 7.0 pending their input    Chronic cholecystitis s/p laparoscopic fenestrated subtotal chidi (06/06/24), ongoing abdominal discomfort   -Patient is on a clear liquid diet but does not have much drive to have intake at this time due to ongoing abdominal discomfort  -She denies any specific nausea or vomiting  -She is utilizing incentive spirometry  -Majority of management including current antibiotics and nutrition as per primary service    HTN, HLD, CAD, Chronic HFpEF   -Blood pressure has been predominantly well-controlled throughout hospitalization  -As mentioned above, no current chest pain or anginal equivalents; is to be continued on home therapies  -Does not appear volume overloaded on examination currently, but does have some diminished breath sounds and CXR is showing evidence of some congestion.  Given diminished intake, will hold off on aggressive IV diuresis for therapies at this time, but moving forward may need to consider if volume status worsens versus adjusting volume status with  "HD  -Cardiology is following the patient as well    ESRD on HD  -Nephrology is following  -Continue home therapies as appropriate    COPD without acute exacerbation  -Patient is slightly diminished on auscultation, but not overtly wheezing and does have fair aeration  -Continue home therapies with caution in regard to albuterol usage with A-fib  -Continue with incentive spirometry, pulmonary hygiene, ambulation as tolerated    NIDDM-II   -Agree with mild SSI as appetite and intake improves  -Continue with hypoglycemic protocol, Accu-Cheks  -Monitor and adjust as needed    Anxiety and depression   -Continue home therapies     Electrolyte derangements: Hypomagnesemia, hypophosphatemia  -Hypomagnesemia: Patient did have a low mag level at 1.37; however, was replaced and did rise to 1.68.  With A-fib history, would start additional replacement with goal to be around the 2.0 range  -Hypophosphatemia: Phos 3.1 this morning, following dialysis and with repeat electrolytes did downtrend to 1.9.  Will order replacement and recheck. Note: as ordering, order for replacement was just added. Repeat labs to follow.     Rosa Calabrese PA-C    General Inpatient Medicine (\"IMS\")  Available via [employer] [id number]. Outside of these hours, please contact providers assigned to the team and/or via the Medicine Acute Pager.    I spent a total of 85 minutes in the overall professional care of this patient on this date of service. Case directly discussed with surgery team on this date of service in addition to chart review of hospital course to date.     Dragon dictation software was used to dictate this note and thus there may be minor errors in translation/transcription including garbled speech or misspellings. Please contact for clarification if needed.    HISTORY OF PRESENT ILLNESS:     History Of Present Illness:    Tana Hargrove is a 79 y.o. female with a significant past medical history of HTN, HLD, CAD, paroxysmal atrial " fibrillation (not on AC d/t anemia and need for recurrent blood transfusion), HFpEF, COPD (no baseline O2), NIDDM2, ESRD on HD, chronic anemia requiring Procrit and intermittent PRBC infusions (in setting of MDS and ESRD), OA, anxiety / depression, GERD, and chronic cholecystitis who presented 06/06/24 for laparoscopic fenestrated subtotal chidi I/s/o acute on chronic cholecystitis. Patient current on Hospital Day #2, being followed by surgery (primary) and nephrology (for HD). Medicine was consulted for perioperative medical management and assistance with management of atrial fibrillation with RVR, acute on chronic anemia. Cardiology also consulted on HD #2.    Patient seen and examined awaiting transfer from  --> ICU in setting of AF RVR, acute on chronic anemia.  Patient reports that she has felt generally weak, rundown, and has had overall very poor appetite and intake following her surgery.  She also admits to an ongoing degree of mild abdominal pain and discomfort which contributes to her not wanting to take in as much.  Very mild nausea, no emesis.  Since her operation, she has been on oxygen which she does not normally wear at home, but it has been slowly decreasing in the amount she needs and she does not report feeling acutely short of breath or winded.  She denies any chest pain or pressure, palpitations, dizziness or lightheadedness, diaphoresis, headache, vision changes, focal or lateralizing sensorimotor deficits.  She did undergo dialysis today, and felt that that went well.  Overall she feels a bit discouraged regarding how she feels right now.    Review of Systems:   I performed a complete 12 point review of systems and it is negative except as noted in HPI or above. All other systems have been reviewed and are negative.    PAST HISTORIES:     Past Medical History:  She has a past medical history of Abnormal levels of other serum enzymes, Acute kidney failure (CMS-HCC), Anemia, CKD (chronic kidney  disease), COPD (chronic obstructive pulmonary disease) (Multi), Coronary artery disease, Disease of blood and blood-forming organs, unspecified, HLD (hyperlipidemia), Hypertension, MDS (myelodysplastic syndrome) (Multi), Personal history of other diseases of the musculoskeletal system and connective tissue, Personal history of other specified conditions, Personal history of other specified conditions, and Type 2 diabetes mellitus (Multi).    She has no past medical history of Adverse effect of anesthesia, Arthritis, Asthma ([facility]-HCC), Awareness under anesthesia, CHF (congestive heart failure) (Multi), Delayed emergence from general anesthesia, Disease of thyroid gland, Hard to intubate, History of transfusion, Malignant hyperthermia, PONV (postoperative nausea and vomiting), Pseudocholinesterase deficiency, Spinal headache, or Stroke (Multi).    Past Surgical History:  She has a past surgical history that includes Other surgical history (12/13/2019); Other surgical history (12/13/2019); Other surgical history (Bilateral, 12/13/2019); Other surgical history (Left, 12/13/2019); Other surgical history (12/13/2019); Other surgical history (12/13/2019); Other surgical history (Right, 12/13/2019); Other surgical history (04/16/2021); MR angio head wo IV contrast (11/20/2020); MR angio neck wo IV contrast (11/20/2020); Breast biopsy (Left); Hysterectomy; Breast lumpectomy; Appendectomy; Colonoscopy; Oophorectomy; and Joint replacement.      Social History:  She reports that she has never smoked. She has never used smokeless tobacco. She reports that she does not currently use alcohol. She reports that she does not use drugs.    Family History:  No family history on file.     Allergies:  Codeine, Hydrocodone, Hydrocodone-acetaminophen, Meperidine (pf), Tramadol, Piperacillin-tazobactam, Adhesive tape-silicones, and Meperidine    OBJECTIVE:     Last Recorded Vitals:  Vitals:    06/08/24 1005 06/08/24 1204 06/08/24 1431  "06/08/24 1540   BP:    123/68   BP Location:    Left arm   Patient Position:    Lying   Pulse:  74 83 (!) 111   Resp:    22   Temp:  36.7 °C (98.1 °F) 36.5 °C (97.7 °F) 37.6 °C (99.7 °F)   TempSrc:  Temporal Temporal Temporal   SpO2: 98%   96%   Weight:       Height:  1.549 m (5' 0.98\")       /68 (BP Location: Left arm, Patient Position: Lying)   Pulse (!) 111   Temp 37.6 °C (99.7 °F) (Temporal)   Resp 22   Ht 1.549 m (5' 0.98\")   Wt 81.9 kg (180 lb 8.9 oz)   SpO2 96%   BMI 34.13 kg/m²      Physical Exam  Vitals and nursing note reviewed.   Constitutional:       General: She is awake. She is not in acute distress.     Appearance: She is well-developed. She is ill-appearing (Appears to be feeling unwell but not systemically toxic). She is not toxic-appearing or diaphoretic.   HENT:      Head: Normocephalic and atraumatic.   Eyes:      Extraocular Movements: Extraocular movements intact.      Pupils: Pupils are equal, round, and reactive to light.   Cardiovascular:      Rate and Rhythm: Tachycardia present. Rhythm irregular.      Pulses: Normal pulses.      Heart sounds: No murmur heard.     No friction rub. No gallop.   Pulmonary:      Effort: Pulmonary effort is normal. No respiratory distress.      Comments: On 2.5 L nasal cannula, chest rise is equal; slightly diminished breath sounds bilaterally without overt adventitious sounds  Abdominal:      General: Bowel sounds are normal.      Palpations: Abdomen is soft. There is no hepatomegaly or splenomegaly.      Comments: Minimal TTP RUQ. Drain in place with scant serosang output. Incision sites are well-appearing.    Musculoskeletal:         General: No deformity or signs of injury. Normal range of motion.      Cervical back: Normal range of motion and neck supple. No rigidity or tenderness.      Right lower leg: No edema.      Left lower leg: No edema.   Skin:     General: Skin is warm and dry.      Capillary Refill: Capillary refill takes less than 2 " seconds.      Coloration: Skin is not pale.      Findings: No erythema, lesion or rash.   Neurological:      General: No focal deficit present.      Mental Status: She is alert and oriented to person, place, and time.      Cranial Nerves: No cranial nerve deficit.      Motor: Weakness (generalized, nonfocal) present.   Psychiatric:         Mood and Affect: Mood normal.         Behavior: Behavior normal. Behavior is cooperative.       Inpatient Medications:  acetaminophen, 975 mg, oral, q8h  ascorbic acid, 500 mg, oral, Daily  carvedilol, 12.5 mg, oral, BID  cholecalciferol, 2,000 Units, oral, Daily  ciprofloxacin, 400 mg, intravenous, Daily  cyanocobalamin, 1,000 mcg, oral, Daily  [START ON 6/9/2024] folic acid, 1 mg, oral, Daily  heparin (porcine), 5,000 Units, subcutaneous, q8h  heparin, 2,000 Units, intra-catheter, After Dialysis  heparin, 2,000 Units, intra-catheter, After Dialysis  insulin lispro, 0-10 Units, subcutaneous, TID  losartan, 50 mg, oral, Daily  magnesium sulfate, 2 g, intravenous, Once  metroNIDAZOLE, 500 mg, intravenous, q8h  multivitamin with minerals, 1 tablet, oral, Daily  oxygen, , inhalation, Continuous - Inhalation  pravastatin, 40 mg, oral, Nightly  pregabalin, 100 mg, oral, BID  zinc sulfate, 50 mg of elemental zinc, oral, Daily      PRN medications: dextrose, dextrose, glucagon, glucagon, heparin flush, HYDROmorphone, metoprolol, ondansetron, oxyCODONE    Outpatient Medications:  Prior to Admission medications    Medication Sig Start Date End Date Taking? Authorizing Provider   amLODIPine (Norvasc) 10 mg tablet Take 1 tablet (10 mg) by mouth once daily. 5/28/24  Yes Pollo Herbert MD PhD   carvedilol (Coreg) 12.5 mg tablet Take 1 tablet (12.5 mg) by mouth 2 times a day. 5/28/24  Yes Pollo Herbert MD PhD   cholecalciferol (Vitamin D-3) 50 MCG (2000 UT) tablet Take 1 tablet (2,000 Units) by mouth once daily. 12/13/19  Yes Historical Provider, MD   cyanocobalamin (Vitamin B-12) 1,000 mcg  tablet Take 1 tablet (1,000 mcg) by mouth once daily. 2/22/23  Yes Historical Provider, MD   losartan (Cozaar) 50 mg tablet Take 1 tablet (50 mg) by mouth once daily. 5/29/24 5/29/25 Yes Pollo Herbert MD PhD   pioglitazone (Actos) 15 mg tablet Take 1 tablet (15 mg) by mouth once daily. 4/16/24  Yes Carlos Higgins MD   pravastatin (Pravachol) 40 mg tablet Take 1 tablet (40 mg) by mouth once daily at bedtime. 4/16/24  Yes Carlos Higgins MD   pregabalin (Lyrica) 100 mg capsule TAKE ONE CAPSULE BY MOUTH TWICE DAILY @9AM & 5PM 5/28/24  Yes Pollo Herbert MD PhD   SITagliptin phosphate (Januvia) 25 mg tablet Take 1 tablet (25 mg) by mouth once daily. 4/16/24  Yes Carlos Higgins MD   amoxicillin-pot clavulanate (Augmentin) 500-125 mg tablet Take 1 tablet by mouth 2 times a day. Stop date 6/6/24  Patient not taking: Reported on 6/6/2024 5/28/24 6/6/24  Pollo Herbert MD PhD       LABS AND IMAGING:     Labs:  Recent labs reviewed in the EMR.    Imaging:  ECG 12 Lead    Result Date: 6/7/2024  Sinus rhythm Borderline prolonged KS interval Borderline prolonged QT interval    ECG 12 lead    Result Date: 5/25/2024  Sinus rhythm Minimal ST depression, lateral leads Prolonged QT interval See ED provider note for full interpretation and clinical correlation Confirmed by Terri Bermudez ([id number]) on 5/25/2024 1:12:33 PM    US gallbladder    Result Date: 5/19/2024  STUDY: Right upper quadrant Ultrasound; 5/19/2024 4:28 PM INDICATION: Status post cholecystostomy, elevated LFTs. COMPARISON: CT A&P today, US gallbladder 4/18/2024, MR abdomen 4/18/2024. ACCESSION NUMBER(S): [id number] ORDERING CLINICIAN: CELIA WYNNE TECHNIQUE: Ultrasound of the right upper quadrant.  Multiple images of the right upper quadrant were obtained. FINDINGS: The liver demonstrates normal echogenicity.   The gallbladder contains sludge as well as multiple stones.  There is edematous wall thickening.  The cholecystectomy tube is visualized within  the gallbladder.  Sonographic Luna's sign is negative.    The common bile duct measures 0.3 cm.  There is no intrahepatic biliary dilatation.   The pancreas is not well seen due to overlying bowel gas.  The right kidney measures 9.8 cm in length.  Renal cortical thinning is present and echotexture is increased.  There is no hydronephrosis. There are no stones.  There is a simple cyst measuring 1.2 x 1.0 x 0.8 cm within the midportion.    Sludge and stones within the gallbladder with edematous wall thickening.  No biliary dilatation is appreciated. Increased echogenicity and thinning of the right renal cortex consistent with intrinsic renal disease.  No hydronephrosis. Signed by Lonnie Rodriguez MD    CT abdomen pelvis wo IV contrast    Result Date: 5/19/2024  STUDY: CT Abdomen and Pelvis without IV Contrast; 05/19/2024, 1:35 PM. INDICATION: Signs/Symptoms:Gallbladder surgery, elevated liver enzymes and lipase from baseline. COMPARISON: XR pelvis: 05/19/24, 04/23/24; IR Biliary: 04/27/24; MR abdomen: 04/14/24; US gallbladder: 04/18/24. ACCESSION NUMBER(S): [id number] ORDERING CLINICIAN: CELIA WYNNE TECHNIQUE: CT of the abdomen and pelvis was performed.  Contiguous axial images were obtained at 3 mm slice thickness through the abdomen and pelvis. Coronal and sagittal reconstructions at 3 mm slice thickness were performed. No intravenous contrast was administered.  Automated mA/kV exposure control was utilized and patient examination was performed in strict accordance with principles of ALARA. FINDINGS: Please note that the evaluation of vessels, lymph nodes and organs is limited without intravenous contrast.  LOWER CHEST: No cardiomegaly.  No pericardial effusion.  Atelectatic changes are noted.  Venous catheter terminates in the RIGHT atrium. Atherosclerosis of the thoracic aorta and coronary arteries is present.  ABDOMEN:  LIVER: No hepatomegaly.  Smooth surface contour.  Normal attenuation.  BILE DUCTS: No  intrahepatic or extrahepatic biliary ductal dilatation.  GALLBLADDER: Percutaneously placed drainage tube noted within the maria isabel hepatis. This appears to be a colostomy tube with decompressed gallbladder versus tubing status post cholecystectomy with residual fluid within the gallbladder fossa.  Clinical correlation for operative history recommended. STOMACH: No abnormalities identified.  PANCREAS: No masses or ductal dilatation.  SPLEEN: No splenomegaly or focal splenic lesion.  ADRENAL GLANDS: No thickening or nodules.  KIDNEYS AND URETERS: Kidneys are normal in size and location.  No renal or ureteral calculi.  PELVIS:  BLADDER: No abnormalities identified.  REPRODUCTIVE ORGANS: Patient is status post hysterectomy. Scattered phleboliths are present within the pelvis.  BOWEL: No abnormalities identified.  VESSELS: No abnormalities identified.  Atherosclerosis of the abdominal aorta and iliac arteries is noted.    PERITONEUM/RETROPERITONEUM/LYMPH NODES: No free fluid.  No pneumoperitoneum. No lymphadenopathy.  ABDOMINAL WALL: No abnormalities identified.  Midline incision scar noted. SOFT TISSUES: No abnormalities identified.   BONES: Patient is status post RIGHT femoral ORIF.  Patient is status post multilevel posterior fusion of L2-L5.  Intervertebral disc spacers at L5-S1 also noted.  Dextroconvex scoliosis of the lumbar spine noted. Moderate degenerative change of the hips demonstrated. Senescent changes are present within the lumbar spine.  No acute fracture or aggressive osseous lesion.    Percutaneous drainage tube noted within the maria isabel hepatis. This appears to be related to a decompressed gallbladder versus positioning status post cholecystectomy with residual fluid within the gallbladder fossa. Clinical correlation for operative history recommended. Signed by Abhi Godoy II, MD    XR pelvis 1-2 views    Result Date: 5/19/2024  STUDY: Pelvis Radiographs; 5/19/2024 10:54AM INDICATION: Fall. COMPARISON:  4/23/2024 XR Pelvis. ACCESSION NUMBER(S): [id number] ORDERING CLINICIAN: CELIA WYNNE TECHNIQUE:  One view(s) of the pelvis. FINDINGS:  The pelvic ring is intact.  There is no acute fracture.  Postsurgical changes of the proximal right femur without evidence of hardware malfunction.  Mild degenerative change of both hips.    No acute osseous abnormality. Signed by Lonnie Rodriguez MD    XR chest 1 view    Result Date: 5/19/2024  STUDY: Chest Radiograph;  5/19/2024 10:54AM INDICATION: Fall. COMPARISON: 4/23/2024 XR Chest. ACCESSION NUMBER(S): [id number] ORDERING CLINICIAN: CELIA WYNNE TECHNIQUE:  Frontal chest was obtained at 11:53 hours. FINDINGS: CARDIOMEDIASTINAL SILHOUETTE: Cardiomediastinal silhouette is normal in size and configuration.  LUNGS: Lungs are clear.  Right-sided dialysis catheter with tip near the cavoatrial junction.  Elevation of the right hemidiaphragm.  ABDOMEN: No remarkable upper abdominal findings.  BONES: No acute osseous changes.    No acute process. Signed by Lonnie Rodriguez MD

## 2024-06-08 NOTE — PROGRESS NOTES
Physical Therapy    Physical Therapy Evaluation    Patient Name: Tana Hargrove  MRN: [id number]  Today's Date: 6/8/2024   Time Calculation  Start Time: 0901  Stop Time: 0922  Time Calculation (min): 21 min    Assessment/Plan   PT Assessment  PT Assessment Results: Decreased strength, Decreased endurance, Impaired balance, Decreased mobility, Decreased coordination, Decreased safety awareness, Impaired hearing, Pain  Rehab Prognosis: Fair  Evaluation/Treatment Tolerance: Patient limited by fatigue, Patient limited by pain  End of Session Communication: Bedside nurse (PT BED INCHAIR POSITIN SET UP TO EAT JELLO, 2 A TO SIT EOB PRN OR BEDIN CHAIR POSITION TODAY)  Assessment Comment: POOR SIT BALANCE ON EOB, NEEDS MAX A FOR SITTIGN ANDMAX X2 TO MOVE IN BED, HIGH FALLRISK,NEEDS MOD REHAB ON DISCH  End of Session Patient Position: Bed, 3 rail up, Alarm on (BED IN CHIAR POSITION TO EAT JELLO, CALLIGHT IN REACH)  IP OR SWING BED PT PLAN  Inpatient or Swing Bed: Inpatient  PT Plan  Treatment/Interventions: Bed mobility, Transfer training, Balance training, Strengthening, Endurance training (PROGRESS TO OOB ASSESSMENT WHEN APPROPRIATE)  PT Plan: Skilled PT  PT Frequency: 4 times per week  PT Discharge Recommendations: Moderate intensity level of continued care  PT Recommended Transfer Status: Assist x2 (BED LEVEL)  PT - OK to Discharge: Yes (WHEN MEDICALLY CLEARED)      Subjective   General Visit Information:  General  Reason for Referral: impaired mobiltiy, gait training  Referred By: ELAINE  Past Medical History Relevant to Rehab: CHRONIC CHOLECYSTITIS; DX: LAP CHORE/KAREN ON 6/6; HX: CAD, HTN, COPD, ESRD ON HS, DM, MDS GOUT, NEUROPATHY, FALLS  Co-Treatment: OT  Co-Treatment Reason: FACILITATE MOBILITY SAFETY  Prior to Session Communication: Bedside nurse (OK FOR THERAPY)  Patient Position Received: Bed, 3 rail up, Alarm on (ROOM 3321, DROWSY,  AWAKENED TO PAIRTICIPATE IN THERAPY, IV 4L O2 NC, PURE WICK)  Home Living:  Home  Living  Type of Home: Condo  Lives With: Alone  Home Adaptive Equipment: Walker rolling or standard  Home Layout: One level  Home Access: Level entry  Home Living Comments: THIS ADM FOR SNF LOC AT [facility], A FOR TRANSFER TO  USING FWW, A FOR ADL AT SNF  Prior Level of Function:  Prior Function Per Pt/Caregiver Report  Level of [place]: Independent with ADLs and functional transfers  Receives Help From: Family  ADL Assistance: Independent  Homemaking Assistance: Independent  Ambulatory Assistance: Independent  Precautions:  Precautions  Hearing/Visual Limitations: [place]  Medical Precautions: Fall precautions, Oxygen therapy device and L/min (4L O2 NC)  Vital Signs:       Objective   Pain:  Pain Assessment  Pain Score:  (ABDOMEN DID NOT RATE)  Pain Interventions: Repositioned, Distraction, Emotional support  Cognition:  Cognition  Overall Cognitive Status: Within Functional Limits  Orientation Level: Disoriented to situation (MOBILITY W/ LAP HANNAH)  Attention: Exceptions to WFL  Sustained Attention: Impaired (DROWSY)  Memory: Exceptions to WFL  Short-Term Memory: Impaired  Safety/Judgement: Exceptions to WFL  Complex Functional Tasks: Moderate  Novel Situations: Moderate  Routine Tasks: Moderate  Other (Comment): POST LAP HANNAH PRECAUTIONS  Insight: Mild  Processing Speed: Delayed    General Assessments:  General Observation  General Observation: LETHARGIC THROUGH OUT SESSION BUT COOPERATIVE, STATES FINALY HUNGRY TODAY, WHILE SEATED EOBNEEDS VC TO HOLD HEAD UP AS KEEPS CHIN TOWARDS CHEST               Activity Tolerance  Endurance: Tolerates 10 - 20 min exercise with multiple rests, Decreased tolerance for upright activites    Sensation  Sensation Comment: NEUROPATHY IN LEGS    Strength  Strength Comments: ROM LEGS WFL, STRENGTH TODAY 2/5, POOR MUSCUALR ENDURANCE  Strength  Strength Comments: ROM LEGS WFL, STRENGTH TODAY 2/5, POOR MUSCUALR ENDURANCE           Coordination  Coordination Comment: LIMBS SHAKY W/  MOBILTIY         Static Sitting Balance  Static Sitting-Comment/Number of Minutes: POOR+  Dynamic Sitting Balance  Dynamic Sitting-Comments: POOR+  Functional Assessments:  Bed Mobility  Bed Mobility:  (SUPIEN<>SIT MAX X2 USING PAD, SAT EOB 4 MIN, MAX A FOR SIT BALANCE, AS LEANS SEVERE TO R, NEEDS MAX A TO CORRECT, MAX X2 UP IN BED USING PAD)  Extremity/Trunk Assessments:  Cervical Spine   Cervical Spine:  (MILD FORWARD HEAD, ROUNDED SHOULDERS)  Outcome Measures:  [facility] Basic Mobility  Turning from your back to your side while in a flat bed without using bedrails: Total  Moving from lying on your back to sitting on the side of a flat bed without using bedrails: Total  Moving to and from bed to chair (including a wheelchair): Total  Standing up from a chair using your arms (e.g. wheelchair or bedside chair): Total  To walk in hospital room: Total  Climbing 3-5 steps with railing: Total  Basic Mobility - Total Score: 6    Encounter Problems       Encounter Problems (Active)       Balance       Goal 1 (Not Progressing)       Start:  06/08/24    Expected End:  06/22/24       SIT EOB CGA FOR 12 MIN WHILE COMPLETING EX AND FUNCTIONAL ACTIVITIES TO  IMPROVE BALANCE, STRENGTH AND ENDURANCE FOR PROGRESS ION TO OOB ACTIVITIES            Mobility       Goal 1 (Not Progressing)       Start:  06/08/24    Expected End:  06/29/24       20 REPS AROM/RROM EX IMPROVING STRENGTH TO  PROGRESS OOB ACTIVITIES            PT Transfers       STG - Patient to transfer to and from sit to supine (Not Progressing)       Start:  06/08/24    Expected End:  06/15/24       MOD X1-2 USING RAILS         STG - Patient will transfer sit to and from stand (Not Progressing)       Start:  06/08/24    Expected End:  06/22/24       FWW MOD X2 A USING PROPER TECHNIQUE            Pain - Adult              Education Documentation  Mobility Training, taught by Chantal Segundo, PT at 6/8/2024 10:43 AM.  Learner: Patient  Readiness: Acceptance  Method:  Explanation  Response: Needs Reinforcement  Comment: IMPORTANCE OF MOBILITY IN RECOVERY, ENCOURAGED SIT EOB AND BED INCHIR POSITION    Education Comments  No comments found.

## 2024-06-08 NOTE — PRE-PROCEDURE NOTE
Report from Sending RN:    Report From: JASMIN KELLOGG  Recent Surgery of Procedure: Yes  Baseline Level of Consciousness (LOC): X3  Oxygen Use: Yes, 2 LITERS  Type: NASAL  Diabetic: Yes  Last BP Med Given Day of Dialysis: SEE EMAR  Last Pain Med Given: SEE EMAR  Lab Tests to be Obtained with Dialysis: No  Blood Transfusion to be Given During Dialysis: No  Available IV Access: Yes  Medications to be Administered During Dialysis: No  Continuous IV Infusion Running: No  Restraints on Currently or in the Last 24 Hours: No  Hand-Off Communication: PT IS ALERT, AND ABLE TO COME TO TX ROOM FOR DIALYSIS  Dialysis Catheter Dressing: NA  Last Dressing Change: NA

## 2024-06-08 NOTE — CARE PLAN
Problem: Balance  Goal: Goal 1  Description: SIT EOB CGA FOR 12 MIN WHILE COMPLETING EX AND FUNCTIONAL ACTIVITIES TO  IMPROVE BALANCE, STRENGTH AND ENDURANCE FOR PROGRESS ION TO OOB ACTIVITIES  Outcome: Not Progressing     Problem: Mobility  Goal: Goal 1  Description: 20 REPS AROM/RROM EX IMPROVING STRENGTH TO  PROGRESS OOB ACTIVITIES  Outcome: Not Progressing     Problem: PT Transfers  Goal: STG - Patient to transfer to and from sit to supine  Description: MOD X1-2 USING RAILS  Outcome: Not Progressing  Goal: STG - Patient will transfer sit to and from stand  Description: FWW MOD X2 A USING PROPER TECHNIQUE  Outcome: Not Progressing

## 2024-06-08 NOTE — SIGNIFICANT EVENT
Patient went into afib RVR on tele w/ HR of 110s. History of afib noted in chart, but patient does not take any anticoagulation at baseline. She has seen Dr. Callaway in the past with cardiology. No chest pain, improving O2 requirements down to 2.5L from 4 this AM. Patient less lethargic than the morning. Lungs CTA b/l, IRR, no murmur auscultated. EKG confirmed afib. CXR, bmp, CBC, troponin, 2U PRBCs ordered. Consulted with cardiology who will be starting a diltiazem drip and recommended transfer to ICU. Discussed patient with IMS, critical care who will also see the patient. Patient transferred to ICU.

## 2024-06-08 NOTE — POST-PROCEDURE NOTE
Report to Receiving RN:    Report To: JASMIN KELLOGG  Time Report Called: [id number]  Hand-Off Communication: PT ALERT ,REMOVED 1 LITER, PT TOLERATED TX WELL, NO ISSUES,VSS  Complications During Treatment: No  Ultrafiltration Treatment: Yes  Medications Administered During Dialysis: No  Blood Products Administered During Dialysis: No  Labs Sent During Dialysis: No  Heparin Drip Rate Changes: No  Dialysis Catheter Dressing: CLEAN AND DRY  Last Dressing Change: 6/7/24    Electronic Signatures:  FRANKI OCDT    Last Updated: 2:36 PM by VINCENT AVALOS

## 2024-06-08 NOTE — PROGRESS NOTES
"Occupational Therapy    Evaluation    Patient Name: Tana Hargrove  MRN: [id number]  Today's Date: 6/8/2024  Time Calculation  Start Time: 0900  Stop Time: 0921  Time Calculation (min): 21 min  3321/3321-A    Assessment  IP OT Assessment  OT Assessment: pt. profoundly weak with continual support needed for sitting EOB  Prognosis: Good  Medical Staff Made Aware: Yes  End of Session Communication: Bedside nurse  End of Session Patient Position: Bed, 3 rail up, Alarm on       06/08/24 0900   Inpatient Plan   Treatment Interventions ADL retraining;Functional transfer training;UE strengthening/ROM;Endurance training   OT Frequency 4 times per week   OT Discharge Recommendations Moderate intensity level of continued care   OT Recommended Transfer Status Assist of 2   OT - OK to Discharge Yes  ((when medically approp.))   Subjective     Current Problem:  1. Chronic cholecystitis  Surgical Pathology Exam    Surgical Pathology Exam          General:  General  Reason for Referral: cholecystitis s/p lap. chidi., drain removal  Referred By: Sydney  Past Medical History Relevant to Rehab: Hx. of CKD, ESRD with HD, COPD, anemia  Co-Treatment: PT  Co-Treatment Reason: to maximize safe mobility  Prior to Session Communication: Bedside nurse  Patient Position Received: Bed, 3 rail up, Alarm on  General Comment: pt. very tired, but pleasant, agreeable, 4 liters O2, c/o abdom. pain , states \"I feel so weak\" t    Precautions:  Medical Precautions: Fall precautions, Oxygen therapy device and L/min    Vital Signs: see nurses notes        Pain:  Pain Assessment  Pain Score:  (c/o abdom. pain)    Objective     Cognition:  Overall Cognitive Status: Within Functional Limits (very tired/fatigued, decreased alertness)  Attention: Exceptions to WFL  Sustained Attention: Impaired  Processing Speed: Delayed             Home Living:  Type of Home: Condo (but admited to hospital from [facility] where skilled)  Lives With: Alone  Home " Layout: One level     Prior Function:  Level of [place]: Independent with ADLs and functional transfers, Independent with homemaking with ambulation  ADL Assistance: Independent  Homemaking Assistance: Independent  Ambulatory Assistance: Independent  Hand Dominance: Left    IADL History:  Homemaking Responsibilities: Yes  Meal Prep Responsibility: Primary  Laundry Responsibility: Primary  Cleaning Responsibility: Primary    ADL:  Eating Assistance: Minimal (set-up within easy reach of L hand with jello and spoon, slow to feed self with some spillage)  Grooming Assistance: Maximal  Toileting Assistance with Device: Total    Activity Tolerance:  Endurance: Tolerates less than 10 min exercise with changes in vital signs    Bed Mobility/Transfers:   Bed Mobility  Bed Mobility: Yes (Max.A X 2 in/out of bed for sitting EOB with continual support)       Ambulation/Gait Training:  Functional Mobility  Functional Mobility Performed: No    Sitting Balance:  Static Sitting Balance  Static Sitting-Balance Support:  (sat EOB for few mins. with continual Min.-Max.A posterior support 2/2 leaning to R side and posteriorly, difficulty keeping head upright)  Dynamic Sitting Balance  Dynamic Sitting-Balance Support:  (Max.A)    Standing Balance: NT , pt. Unable at this time 2/2 fatigue       Vision: Vision - Basic Assessment  Current Vision: No visual deficits       Sensation:  Light Touch: No apparent deficits    Strength:  Strength Comments: B UEs fair all planes    Perception:  Inattention/Neglect: Appears intact    Coordination:  Movements are Fluid and Coordinated: No (all slowed and effortful)     Hand Function:  Hand Function  Gross Grasp: Functional      Outcome Measures: [facility] Daily Activity  Putting on and taking off regular lower body clothing: Total  Bathing (including washing, rinsing, drying): Total  Putting on and taking off regular upper body clothing: A lot  Toileting, which includes using toilet, bedpan or  urinal: Total  Taking care of personal grooming such as brushing teeth: A lot  Eating Meals: A little  Daily Activity - Total Score: 10                    EDUCATION:     Education Documentation  Body Mechanics, taught by Silvia Kang OT at 6/8/2024 10:55 AM.  Learner: Patient  Readiness: Acceptance  Method: Demonstration  Response: Needs Reinforcement  Comment: sitting balance EOB    Education Comments  No comments found.        Goals:   Encounter Problems       Encounter Problems (Active)       ADLs       Demo. grooming, brushing teeth, washing face while seated EOB with SBA. (Progressing)       Start:  06/08/24    Expected End:  06/22/24               BALANCE       Kyler. 1 min. static stand with FWW with Min. A.  (Progressing)       Start:  06/08/24    Expected End:  06/22/24               EXERCISE/STRENGTHENING       Kyler. UE ex. all planes min. resist. 10 reps.with min. fatigue.  (Progressing)       Start:  06/08/24    Expected End:  06/22/24

## 2024-06-08 NOTE — PROGRESS NOTES
"      Nephrology Progress Note      Nephrology following for ESRD .   Events over night:   Seen on HD   Feeling weak   C/o right side abd pain       /72 (BP Location: Left arm, Patient Position: Lying)   Pulse 83   Temp 36.5 °C (97.7 °F) (Temporal)   Resp 18   Ht 1.549 m (5' 0.98\")   Wt 81.9 kg (180 lb 8.9 oz)   SpO2 98%   BMI 34.13 kg/m²     Input / Output:  24 HR:   Intake/Output Summary (Last 24 hours) at 6/8/2024 1451  Last data filed at 6/8/2024 1436  Gross per 24 hour   Intake 1700 ml   Output 2857 ml   Net -1157 ml       Physical Exam   Alert and oriented x 3 NAD  Neck: no JVD  CV: RRR  Lungs: CTA bilaterally  Abd: soft, NT, ND   Ext: no  lower extremity edema    Scheduled medications  amLODIPine, 10 mg, oral, Daily  ascorbic acid, 500 mg, oral, Daily  carvedilol, 12.5 mg, oral, BID  cholecalciferol, 2,000 Units, oral, Daily  ciprofloxacin, 400 mg, intravenous, Daily  cyanocobalamin, 1,000 mcg, oral, Daily  heparin (porcine), 5,000 Units, subcutaneous, q8h  heparin, 2,000 Units, intra-catheter, After Dialysis  heparin, 2,000 Units, intra-catheter, After Dialysis  insulin lispro, 0-10 Units, subcutaneous, TID  losartan, 50 mg, oral, Daily  metroNIDAZOLE, 500 mg, intravenous, q8h  multivitamin with minerals, 1 tablet, oral, Daily  oxygen, , inhalation, Continuous - Inhalation  pravastatin, 40 mg, oral, Nightly  pregabalin, 100 mg, oral, BID  zinc sulfate, 50 mg of elemental zinc, oral, Daily      Continuous medications     PRN medications  PRN medications: acetaminophen, dextrose, dextrose, glucagon, glucagon, heparin flush, HYDROmorphone, ondansetron, oxyCODONE   Results from last 7 days   Lab Units 06/08/24  0514 06/07/24  1251   SODIUM mmol/L 137  --    POTASSIUM mmol/L 4.3  --    CHLORIDE mmol/L 103  --    CO2 mmol/L 30  --    BUN mg/dL 21  --    CREATININE mg/dL 1.92*  --    CALCIUM mg/dL 8.9  --    PROTEIN TOTAL g/dL  --  5.6*   BILIRUBIN TOTAL mg/dL  --  1.0   ALK PHOS U/L  --  102   ALT U/L "  --  35   AST U/L  --  24   GLUCOSE mg/dL 126*  --       Results from last 7 days   Lab Units 06/08/24  0514 06/07/24  0419 06/06/24  1521   MAGNESIUM mg/dL 1.77 1.37* 1.49*      Results from last 7 days   Lab Units 06/08/24  0514 06/07/24  0419 06/06/24  1521   WBC AUTO x10*3/uL 6.7 10.6 9.5   HEMOGLOBIN g/dL 6.9* 6.6* 8.3*   HEMATOCRIT % 21.3* 20.4* 25.8*   PLATELETS AUTO x10*3/uL 208 256 265        Assessment & Plan:   Patient is 79 y.o. female with ESRD who is admitted to hospital for elective lap cholecystectomy. Nephrology consulted in view of ESRD.     ESRD  -Patient was on Monday Wednesday Friday at [facility] but has been in rehab in [place] and on TTS schedule there.        Anemia of ESRD and myelodysplastic syndrome  She is on MARILYNN with dialysis  She requires fairly frequent PRBC infusion     CKD-MBD  -Patient is currently not on phosphorus binder     Recommendations:   -Hemodialysis today,  and then  back on her TTS schedule that she has at [facility]  PRBC transfusion for Hgb <7           Please message me through EPIC chat with any questions or concerns.     Luann Painting MD  6/8/2024  2:51 PM     [facility]    [address], [address]   [place] [address]  Office: [phone]

## 2024-06-09 LAB
ANION GAP SERPL CALC-SCNC: 9 MMOL/L (ref 10–20)
APTT PPP: 32 SECONDS (ref 27–38)
BLOOD EXPIRATION DATE: NORMAL
BLOOD EXPIRATION DATE: NORMAL
BUN SERPL-MCNC: 17 MG/DL (ref 6–23)
CALCIUM SERPL-MCNC: 8.5 MG/DL (ref 8.6–10.3)
CHLORIDE SERPL-SCNC: 101 MMOL/L (ref 98–107)
CO2 SERPL-SCNC: 30 MMOL/L (ref 21–32)
CREAT SERPL-MCNC: 1.59 MG/DL (ref 0.5–1.05)
DISPENSE STATUS: NORMAL
DISPENSE STATUS: NORMAL
EGFRCR SERPLBLD CKD-EPI 2021: 33 ML/MIN/1.73M*2
ERYTHROCYTE [DISTWIDTH] IN BLOOD BY AUTOMATED COUNT: 21.3 % (ref 11.5–14.5)
GLUCOSE BLD MANUAL STRIP-MCNC: 107 MG/DL (ref 74–99)
GLUCOSE BLD MANUAL STRIP-MCNC: 114 MG/DL (ref 74–99)
GLUCOSE BLD MANUAL STRIP-MCNC: 156 MG/DL (ref 74–99)
GLUCOSE BLD MANUAL STRIP-MCNC: 179 MG/DL (ref 74–99)
GLUCOSE SERPL-MCNC: 112 MG/DL (ref 74–99)
HCT VFR BLD AUTO: 23.9 % (ref 36–46)
HGB BLD-MCNC: 8.1 G/DL (ref 12–16)
INR PPP: 1.3 (ref 0.9–1.1)
MAGNESIUM SERPL-MCNC: 2.33 MG/DL (ref 1.6–2.4)
MCH RBC QN AUTO: 30.1 PG (ref 26–34)
MCHC RBC AUTO-ENTMCNC: 33.9 G/DL (ref 32–36)
MCV RBC AUTO: 89 FL (ref 80–100)
NRBC BLD-RTO: 0 /100 WBCS (ref 0–0)
PHOSPHATE SERPL-MCNC: 3.3 MG/DL (ref 2.5–4.9)
PLATELET # BLD AUTO: 227 X10*3/UL (ref 150–450)
POTASSIUM SERPL-SCNC: 4 MMOL/L (ref 3.5–5.3)
PRODUCT BLOOD TYPE: 6200
PRODUCT BLOOD TYPE: NORMAL
PRODUCT CODE: NORMAL
PRODUCT CODE: NORMAL
PROTHROMBIN TIME: 14.2 SECONDS (ref 9.8–12.8)
RBC # BLD AUTO: 2.69 X10*6/UL (ref 4–5.2)
SODIUM SERPL-SCNC: 136 MMOL/L (ref 136–145)
TSH SERPL-ACNC: 1.91 MIU/L (ref 0.44–3.98)
UFH PPP CHRO-ACNC: 0.5 IU/ML
UFH PPP CHRO-ACNC: 0.6 IU/ML
UFH PPP CHRO-ACNC: 1.1 IU/ML
UNIT ABO: NORMAL
UNIT ABO: NORMAL
UNIT NUMBER: NORMAL
UNIT NUMBER: NORMAL
UNIT RH: NORMAL
UNIT RH: NORMAL
UNIT VOLUME: 350
UNIT VOLUME: 350
WBC # BLD AUTO: 5.5 X10*3/UL (ref 4.4–11.3)
XM INTEP: NORMAL
XM INTEP: NORMAL

## 2024-06-09 PROCEDURE — 99233 SBSQ HOSP IP/OBS HIGH 50: CPT | Performed by: INTERNAL MEDICINE

## 2024-06-09 PROCEDURE — 36415 COLL VENOUS BLD VENIPUNCTURE: CPT

## 2024-06-09 PROCEDURE — 1200000002 HC GENERAL ROOM WITH TELEMETRY DAILY

## 2024-06-09 PROCEDURE — 85610 PROTHROMBIN TIME: CPT | Performed by: SURGERY

## 2024-06-09 PROCEDURE — 82947 ASSAY GLUCOSE BLOOD QUANT: CPT

## 2024-06-09 PROCEDURE — 85027 COMPLETE CBC AUTOMATED: CPT

## 2024-06-09 PROCEDURE — 2500000001 HC RX 250 WO HCPCS SELF ADMINISTERED DRUGS (ALT 637 FOR MEDICARE OP): Performed by: SURGERY

## 2024-06-09 PROCEDURE — 84100 ASSAY OF PHOSPHORUS: CPT

## 2024-06-09 PROCEDURE — 85730 THROMBOPLASTIN TIME PARTIAL: CPT | Performed by: SURGERY

## 2024-06-09 PROCEDURE — 2500000001 HC RX 250 WO HCPCS SELF ADMINISTERED DRUGS (ALT 637 FOR MEDICARE OP): Performed by: INTERNAL MEDICINE

## 2024-06-09 PROCEDURE — 36415 COLL VENOUS BLD VENIPUNCTURE: CPT | Performed by: SURGERY

## 2024-06-09 PROCEDURE — 84443 ASSAY THYROID STIM HORMONE: CPT | Performed by: STUDENT IN AN ORGANIZED HEALTH CARE EDUCATION/TRAINING PROGRAM

## 2024-06-09 PROCEDURE — 83735 ASSAY OF MAGNESIUM: CPT

## 2024-06-09 PROCEDURE — 99222 1ST HOSP IP/OBS MODERATE 55: CPT | Performed by: INTERNAL MEDICINE

## 2024-06-09 PROCEDURE — 82374 ASSAY BLOOD CARBON DIOXIDE: CPT

## 2024-06-09 PROCEDURE — 85520 HEPARIN ASSAY: CPT | Mod: 91 | Performed by: SURGERY

## 2024-06-09 PROCEDURE — 99232 SBSQ HOSP IP/OBS MODERATE 35: CPT | Performed by: HOSPITALIST

## 2024-06-09 PROCEDURE — 2500000005 HC RX 250 GENERAL PHARMACY W/O HCPCS: Performed by: SURGERY

## 2024-06-09 PROCEDURE — 2500000004 HC RX 250 GENERAL PHARMACY W/ HCPCS (ALT 636 FOR OP/ED): Mod: JZ | Performed by: SURGERY

## 2024-06-09 PROCEDURE — 99024 POSTOP FOLLOW-UP VISIT: CPT | Performed by: SURGERY

## 2024-06-09 RX ORDER — CARVEDILOL 25 MG/1
25 TABLET ORAL 2 TIMES DAILY
Status: DISCONTINUED | OUTPATIENT
Start: 2024-06-09 | End: 2024-06-13 | Stop reason: HOSPADM

## 2024-06-09 RX ORDER — POLYETHYLENE GLYCOL 3350 17 G/17G
17 POWDER, FOR SOLUTION ORAL DAILY
Status: DISCONTINUED | OUTPATIENT
Start: 2024-06-09 | End: 2024-06-13 | Stop reason: HOSPADM

## 2024-06-09 RX ORDER — BISACODYL 10 MG/1
10 SUPPOSITORY RECTAL DAILY PRN
Status: DISCONTINUED | OUTPATIENT
Start: 2024-06-09 | End: 2024-06-13 | Stop reason: HOSPADM

## 2024-06-09 RX ORDER — HEPARIN SODIUM 10000 [USP'U]/100ML
0-4500 INJECTION, SOLUTION INTRAVENOUS CONTINUOUS
Status: DISCONTINUED | OUTPATIENT
Start: 2024-06-09 | End: 2024-06-10

## 2024-06-09 RX ORDER — DOCUSATE SODIUM 100 MG/1
100 CAPSULE, LIQUID FILLED ORAL 2 TIMES DAILY
Status: DISCONTINUED | OUTPATIENT
Start: 2024-06-09 | End: 2024-06-13 | Stop reason: HOSPADM

## 2024-06-09 RX ADMIN — Medication 50 MG OF ELEMENTAL ZINC: at 08:06

## 2024-06-09 RX ADMIN — OXYCODONE HYDROCHLORIDE AND ACETAMINOPHEN 500 MG: 500 TABLET ORAL at 08:10

## 2024-06-09 RX ADMIN — INSULIN LISPRO 2 UNITS: 100 INJECTION, SOLUTION INTRAVENOUS; SUBCUTANEOUS at 11:09

## 2024-06-09 RX ADMIN — ACETAMINOPHEN 975 MG: 325 TABLET ORAL at 15:44

## 2024-06-09 RX ADMIN — ACETAMINOPHEN 975 MG: 325 TABLET ORAL at 05:37

## 2024-06-09 RX ADMIN — HEPARIN SODIUM 5000 UNITS: 5000 INJECTION INTRAVENOUS; SUBCUTANEOUS at 08:12

## 2024-06-09 RX ADMIN — LOSARTAN POTASSIUM 50 MG: 50 TABLET, FILM COATED ORAL at 08:11

## 2024-06-09 RX ADMIN — METRONIDAZOLE 500 MG: 500 INJECTION, SOLUTION INTRAVENOUS at 02:11

## 2024-06-09 RX ADMIN — Medication 2 L/MIN: at 00:30

## 2024-06-09 RX ADMIN — PRAVASTATIN SODIUM 40 MG: 40 TABLET ORAL at 20:00

## 2024-06-09 RX ADMIN — PREGABALIN 100 MG: 50 CAPSULE ORAL at 08:11

## 2024-06-09 RX ADMIN — FOLIC ACID 1 MG: 1 TABLET ORAL at 08:11

## 2024-06-09 RX ADMIN — CYANOCOBALAMIN TAB 1000 MCG 1000 MCG: 1000 TAB at 08:12

## 2024-06-09 RX ADMIN — Medication 1 L/MIN: at 20:00

## 2024-06-09 RX ADMIN — METRONIDAZOLE 500 MG: 500 INJECTION, SOLUTION INTRAVENOUS at 09:39

## 2024-06-09 RX ADMIN — CARVEDILOL 25 MG: 25 TABLET, FILM COATED ORAL at 20:00

## 2024-06-09 RX ADMIN — CARVEDILOL 25 MG: 25 TABLET, FILM COATED ORAL at 08:11

## 2024-06-09 RX ADMIN — METRONIDAZOLE 500 MG: 500 INJECTION, SOLUTION INTRAVENOUS at 18:13

## 2024-06-09 RX ADMIN — Medication 1 L/MIN: at 23:37

## 2024-06-09 RX ADMIN — PREGABALIN 100 MG: 50 CAPSULE ORAL at 20:00

## 2024-06-09 RX ADMIN — Medication 2000 UNITS: at 08:10

## 2024-06-09 RX ADMIN — HEPARIN SODIUM 5000 UNITS: 5000 INJECTION INTRAVENOUS; SUBCUTANEOUS at 00:19

## 2024-06-09 RX ADMIN — CIPROFLOXACIN 400 MG: 400 INJECTION, SOLUTION INTRAVENOUS at 08:14

## 2024-06-09 RX ADMIN — Medication 1 TABLET: at 08:12

## 2024-06-09 RX ADMIN — OXYCODONE HYDROCHLORIDE 5 MG: 5 TABLET ORAL at 08:08

## 2024-06-09 RX ADMIN — HEPARIN SODIUM 1500 UNITS/HR: 10000 INJECTION, SOLUTION INTRAVENOUS at 09:45

## 2024-06-09 RX ADMIN — ACETAMINOPHEN 975 MG: 325 TABLET ORAL at 22:15

## 2024-06-09 ASSESSMENT — COGNITIVE AND FUNCTIONAL STATUS - GENERAL
TOILETING: TOTAL
TURNING FROM BACK TO SIDE WHILE IN FLAT BAD: TOTAL
EATING MEALS: A LOT
WALKING IN HOSPITAL ROOM: TOTAL
DAILY ACTIVITIY SCORE: 7
PERSONAL GROOMING: TOTAL
MOVING TO AND FROM BED TO CHAIR: TOTAL
DRESSING REGULAR UPPER BODY CLOTHING: TOTAL
STANDING UP FROM CHAIR USING ARMS: TOTAL
CLIMB 3 TO 5 STEPS WITH RAILING: TOTAL
MOBILITY SCORE: 7
DRESSING REGULAR LOWER BODY CLOTHING: TOTAL
MOVING FROM LYING ON BACK TO SITTING ON SIDE OF FLAT BED WITH BEDRAILS: A LOT
HELP NEEDED FOR BATHING: TOTAL

## 2024-06-09 ASSESSMENT — ENCOUNTER SYMPTOMS
CHILLS: 0
FREQUENCY: 0
WEAKNESS: 1
ARTHRALGIAS: 0
DIARRHEA: 0
FEVER: 0
SORE THROAT: 0
BACK PAIN: 0
COUGH: 0
EYE REDNESS: 0
VOMITING: 0
COLOR CHANGE: 0
NAUSEA: 0
HEADACHES: 0
ABDOMINAL PAIN: 1
DYSURIA: 0
CONSTIPATION: 0
LIGHT-HEADEDNESS: 0
EYE DISCHARGE: 0
SHORTNESS OF BREATH: 0

## 2024-06-09 ASSESSMENT — PAIN - FUNCTIONAL ASSESSMENT
PAIN_FUNCTIONAL_ASSESSMENT: 0-10

## 2024-06-09 ASSESSMENT — PAIN DESCRIPTION - LOCATION
LOCATION: NECK
LOCATION: ABDOMEN

## 2024-06-09 ASSESSMENT — PAIN SCALES - GENERAL
PAINLEVEL_OUTOF10: 0 - NO PAIN
PAINLEVEL_OUTOF10: 8
PAINLEVEL_OUTOF10: 0 - NO PAIN
PAINLEVEL_OUTOF10: 8
PAINLEVEL_OUTOF10: 0 - NO PAIN

## 2024-06-09 NOTE — PROGRESS NOTES
"GENERAL SURGERY PROGRESS NOTE    Tana Hargrove   1945   [id number]     Tana Hargrove is a 79 y.o. female on day 3 of admission presenting with Chronic cholecystitis.      Subjective  Pt JASWINDER. Improving weakness this AM, much more alert. Pulling 250 on IS. Converted into sinus overnight, cardizem drip held. Did not know she needed to order food and has not been eating because of this, she has an appetite. Pain improving.    Review of Systems   Constitutional:  Negative for chills and fever.   HENT:  Negative for congestion and sore throat.    Eyes:  Negative for discharge and redness.   Respiratory:  Negative for cough and shortness of breath.    Gastrointestinal:  Positive for abdominal pain. Negative for constipation, diarrhea, nausea and vomiting.   Endocrine: Negative for cold intolerance and heat intolerance.   Genitourinary:  Negative for dysuria and frequency.   Musculoskeletal:  Negative for arthralgias and back pain.   Skin:  Negative for color change and rash.   Neurological:  Positive for weakness. Negative for light-headedness and headaches.       Objective    Last Recorded Vitals  Blood pressure 126/56, pulse 70, temperature 36.8 °C (98.2 °F), temperature source Temporal, resp. rate 16, height 1.549 m (5' 0.98\"), weight 81.9 kg (180 lb 8.9 oz), SpO2 97%.    Intake/Output last 3 Shifts:  I/O last 3 completed shifts:  In: 2318.3 (28.3 mL/kg) [I.V.:1068.3 (13 mL/kg); Blood:350; Other:400; IV Piggyback:500]  Out: 3075 (37.5 mL/kg) [Urine:670 (0.2 mL/kg/hr); Drains:5; Other:2400]  Weight: 81.9 kg     Gen: NAD.  A&Ox3. Weak but rousable  HEENT: NC/AT.  Moist mucous membranes.  Neck: Normal range of motion.  CV: Regular rate.  Chest: Normal chest rise.  Normal respiratory effort. CTA b/l.  Abdomen: Soft.  RUQ minimal TTP. RUQ drain with scant serous output.  No rigidity or guarding.  Extremities: No edema.  Moving all extremities.    Relevant Results  Results for orders placed or performed during the " hospital encounter of 06/06/24 (from the past 24 hour(s))   POCT GLUCOSE   Result Value Ref Range    POCT Glucose 148 (H) 74 - 99 mg/dL   POCT GLUCOSE   Result Value Ref Range    POCT Glucose 145 (H) 74 - 99 mg/dL   CBC and Auto Differential   Result Value Ref Range    WBC 6.6 4.4 - 11.3 x10*3/uL    nRBC 0.0 0.0 - 0.0 /100 WBCs    RBC 2.38 (L) 4.00 - 5.20 x10*6/uL    Hemoglobin 7.2 (L) 12.0 - 16.0 g/dL    Hematocrit 21.8 (L) 36.0 - 46.0 %    MCV 92 80 - 100 fL    MCH 30.3 26.0 - 34.0 pg    MCHC 33.0 32.0 - 36.0 g/dL    RDW 22.5 (H) 11.5 - 14.5 %    Platelets 211 150 - 450 x10*3/uL    Neutrophils % 77.0 40.0 - 80.0 %    Immature Granulocytes %, Automated 0.3 0.0 - 0.9 %    Lymphocytes % 12.0 13.0 - 44.0 %    Monocytes % 9.2 2.0 - 10.0 %    Eosinophils % 1.2 0.0 - 6.0 %    Basophils % 0.3 0.0 - 2.0 %    Neutrophils Absolute 5.09 1.60 - 5.50 x10*3/uL    Immature Granulocytes Absolute, Automated 0.02 0.00 - 0.50 x10*3/uL    Lymphocytes Absolute 0.79 (L) 0.80 - 3.00 x10*3/uL    Monocytes Absolute 0.61 0.05 - 0.80 x10*3/uL    Eosinophils Absolute 0.08 0.00 - 0.40 x10*3/uL    Basophils Absolute 0.02 0.00 - 0.10 x10*3/uL   Phosphorus   Result Value Ref Range    Phosphorus 1.9 (L) 2.5 - 4.9 mg/dL   Magnesium   Result Value Ref Range    Magnesium 1.68 1.60 - 2.40 mg/dL   Basic Metabolic Panel   Result Value Ref Range    Glucose 143 (H) 74 - 99 mg/dL    Sodium 137 136 - 145 mmol/L    Potassium 3.6 3.5 - 5.3 mmol/L    Chloride 102 98 - 107 mmol/L    Bicarbonate 30 21 - 32 mmol/L    Anion Gap 9 (L) 10 - 20 mmol/L    Urea Nitrogen 11 6 - 23 mg/dL    Creatinine 1.20 (H) 0.50 - 1.05 mg/dL    eGFR 46 (L) >60 mL/min/1.73m*2    Calcium 8.7 8.6 - 10.3 mg/dL   Troponin I, High Sensitivity   Result Value Ref Range    Troponin I, High Sensitivity 13 0 - 13 ng/L   Morphology   Result Value Ref Range    RBC Morphology See Below     Polychromasia Mild     Hypochromia Mild     Ovalocytes Few     Basophilic Stippling Present    POCT GLUCOSE    Result Value Ref Range    POCT Glucose 131 (H) 74 - 99 mg/dL   TSH with reflex to Free T4 if abnormal   Result Value Ref Range    Thyroid Stimulating Hormone 1.91 0.44 - 3.98 mIU/L   CBC   Result Value Ref Range    WBC 5.5 4.4 - 11.3 x10*3/uL    nRBC 0.0 0.0 - 0.0 /100 WBCs    RBC 2.69 (L) 4.00 - 5.20 x10*6/uL    Hemoglobin 8.1 (L) 12.0 - 16.0 g/dL    Hematocrit 23.9 (L) 36.0 - 46.0 %    MCV 89 80 - 100 fL    MCH 30.1 26.0 - 34.0 pg    MCHC 33.9 32.0 - 36.0 g/dL    RDW 21.3 (H) 11.5 - 14.5 %    Platelets 227 150 - 450 x10*3/uL   Basic Metabolic Panel   Result Value Ref Range    Glucose 112 (H) 74 - 99 mg/dL    Sodium 136 136 - 145 mmol/L    Potassium 4.0 3.5 - 5.3 mmol/L    Chloride 101 98 - 107 mmol/L    Bicarbonate 30 21 - 32 mmol/L    Anion Gap 9 (L) 10 - 20 mmol/L    Urea Nitrogen 17 6 - 23 mg/dL    Creatinine 1.59 (H) 0.50 - 1.05 mg/dL    eGFR 33 (L) >60 mL/min/1.73m*2    Calcium 8.5 (L) 8.6 - 10.3 mg/dL   Magnesium   Result Value Ref Range    Magnesium 2.33 1.60 - 2.40 mg/dL   Phosphorus   Result Value Ref Range    Phosphorus 3.3 2.5 - 4.9 mg/dL     *Note: Due to a large number of results and/or encounters for the requested time period, some results have not been displayed. A complete set of results can be found in Results Review.       ECG 12 Lead    Result Date: 6/7/2024  Sinus rhythm Borderline prolonged OK interval Borderline prolonged QT interval    ECG 12 lead    Result Date: 5/25/2024  Sinus rhythm Minimal ST depression, lateral leads Prolonged QT interval See ED provider note for full interpretation and clinical correlation Confirmed by Terri Bermudez ([id number]) on 5/25/2024 1:12:33 PM    US gallbladder    Result Date: 5/19/2024  STUDY: Right upper quadrant Ultrasound; 5/19/2024 4:28 PM INDICATION: Status post cholecystostomy, elevated LFTs. COMPARISON: CT A&P today, US gallbladder 4/18/2024, MR abdomen 4/18/2024. ACCESSION NUMBER(S): [id number] ORDERING CLINICIAN: CELIA WYNNE TECHNIQUE:  Ultrasound of the right upper quadrant.  Multiple images of the right upper quadrant were obtained. FINDINGS: The liver demonstrates normal echogenicity.   The gallbladder contains sludge as well as multiple stones.  There is edematous wall thickening.  The cholecystectomy tube is visualized within the gallbladder.  Sonographic Luna's sign is negative.    The common bile duct measures 0.3 cm.  There is no intrahepatic biliary dilatation.   The pancreas is not well seen due to overlying bowel gas.  The right kidney measures 9.8 cm in length.  Renal cortical thinning is present and echotexture is increased.  There is no hydronephrosis. There are no stones.  There is a simple cyst measuring 1.2 x 1.0 x 0.8 cm within the midportion.    Sludge and stones within the gallbladder with edematous wall thickening.  No biliary dilatation is appreciated. Increased echogenicity and thinning of the right renal cortex consistent with intrinsic renal disease.  No hydronephrosis. Signed by Lonnie Rodriguez MD    CT abdomen pelvis wo IV contrast    Result Date: 5/19/2024  STUDY: CT Abdomen and Pelvis without IV Contrast; 05/19/2024, 1:35 PM. INDICATION: Signs/Symptoms:Gallbladder surgery, elevated liver enzymes and lipase from baseline. COMPARISON: XR pelvis: 05/19/24, 04/23/24; IR Biliary: 04/27/24; MR abdomen: 04/14/24; US gallbladder: 04/18/24. ACCESSION NUMBER(S): [id number] ORDERING CLINICIAN: CELIA WYNNE TECHNIQUE: CT of the abdomen and pelvis was performed.  Contiguous axial images were obtained at 3 mm slice thickness through the abdomen and pelvis. Coronal and sagittal reconstructions at 3 mm slice thickness were performed. No intravenous contrast was administered.  Automated mA/kV exposure control was utilized and patient examination was performed in strict accordance with principles of ALARA. FINDINGS: Please note that the evaluation of vessels, lymph nodes and organs is limited without intravenous contrast.  LOWER  CHEST: No cardiomegaly.  No pericardial effusion.  Atelectatic changes are noted.  Venous catheter terminates in the RIGHT atrium. Atherosclerosis of the thoracic aorta and coronary arteries is present.  ABDOMEN:  LIVER: No hepatomegaly.  Smooth surface contour.  Normal attenuation.  BILE DUCTS: No intrahepatic or extrahepatic biliary ductal dilatation.  GALLBLADDER: Percutaneously placed drainage tube noted within the maria isabel hepatis. This appears to be a colostomy tube with decompressed gallbladder versus tubing status post cholecystectomy with residual fluid within the gallbladder fossa.  Clinical correlation for operative history recommended. STOMACH: No abnormalities identified.  PANCREAS: No masses or ductal dilatation.  SPLEEN: No splenomegaly or focal splenic lesion.  ADRENAL GLANDS: No thickening or nodules.  KIDNEYS AND URETERS: Kidneys are normal in size and location.  No renal or ureteral calculi.  PELVIS:  BLADDER: No abnormalities identified.  REPRODUCTIVE ORGANS: Patient is status post hysterectomy. Scattered phleboliths are present within the pelvis.  BOWEL: No abnormalities identified.  VESSELS: No abnormalities identified.  Atherosclerosis of the abdominal aorta and iliac arteries is noted.    PERITONEUM/RETROPERITONEUM/LYMPH NODES: No free fluid.  No pneumoperitoneum. No lymphadenopathy.  ABDOMINAL WALL: No abnormalities identified.  Midline incision scar noted. SOFT TISSUES: No abnormalities identified.   BONES: Patient is status post RIGHT femoral ORIF.  Patient is status post multilevel posterior fusion of L2-L5.  Intervertebral disc spacers at L5-S1 also noted.  Dextroconvex scoliosis of the lumbar spine noted. Moderate degenerative change of the hips demonstrated. Senescent changes are present within the lumbar spine.  No acute fracture or aggressive osseous lesion.    Percutaneous drainage tube noted within the maria isabel hepatis. This appears to be related to a decompressed gallbladder versus  positioning status post cholecystectomy with residual fluid within the gallbladder fossa. Clinical correlation for operative history recommended. Signed by Abhi Godoy II, MD    XR pelvis 1-2 views    Result Date: 5/19/2024  STUDY: Pelvis Radiographs; 5/19/2024 10:54AM INDICATION: Fall. COMPARISON: 4/23/2024 XR Pelvis. ACCESSION NUMBER(S): [id number] ORDERING CLINICIAN: CELIA WYNNE TECHNIQUE:  One view(s) of the pelvis. FINDINGS:  The pelvic ring is intact.  There is no acute fracture.  Postsurgical changes of the proximal right femur without evidence of hardware malfunction.  Mild degenerative change of both hips.    No acute osseous abnormality. Signed by Lonnie Rodriguez MD    XR chest 1 view    Result Date: 5/19/2024  STUDY: Chest Radiograph;  5/19/2024 10:54AM INDICATION: Fall. COMPARISON: 4/23/2024 XR Chest. ACCESSION NUMBER(S): [id number] ORDERING CLINICIAN: CELIA WYNNE TECHNIQUE:  Frontal chest was obtained at 11:53 hours. FINDINGS: CARDIOMEDIASTINAL SILHOUETTE: Cardiomediastinal silhouette is normal in size and configuration.  LUNGS: Lungs are clear.  Right-sided dialysis catheter with tip near the cavoatrial junction.  Elevation of the right hemidiaphragm.  ABDOMEN: No remarkable upper abdominal findings.  BONES: No acute osseous changes.    No acute process. Signed by Lonnie Rodriguez MD      Assessment and Plan  Principal Problem:    Chronic cholecystitis  Active Problems:    Chronic diastolic heart failure of unknown etiology (Multi)    Hypertension, essential    GERD (gastroesophageal reflux disease)    Hyperlipidemia    Myelodysplastic syndrome (Multi)    COPD without exacerbation (Multi)    ESRD (end stage renal disease) on dialysis (Multi)    Anemia due to chronic kidney disease, on chronic dialysis (Multi)    Atrial fibrillation with rapid ventricular response (Multi)    79 y.o. female with chronic cholecystitis s/p lap partial fenestrated cholecystecomy    - Continue renal diet  - Continue  MVI, zinc, Vit C, protein shakes  - Patient needs to ambulate, discussed with nursing. Did not perform well w/ PT yesterday up to the chair. To try again today.  - pulled 1L off dialysis yesterday, TRS schedule per nephro  - continue cipro/flagyl    Discussed with Dr. Sydney Kohler, DO PGY2  General Surgery

## 2024-06-09 NOTE — CONSULTS
Inpatient consult to Cardiology  Consult performed by: Facundo Martínez MD  Consult ordered by: Ronald Grimes MD        History Of Present Illness:    Tana Hargrove is a 79 y.o. female with PMHx sig for Myelodysplastic syndrome with associated anemia, hypertension, dyslipidemia, diabetes mellitus, CKD.  COPD, ESRD on hemodialysis, type 2 diabetes mellitus, gout, neuropathy, chronic cholecystitis presented on 06/06/24 for laparoscopic fenestrated subtotal cholecystectomy. Patient is currently on hospital day #2 and being followed by surgery (primary), nephrology (for HD), and internal medicine. Earlier this evening, patient noted to have developed atrial fibrillation with RVR on telemetry with heart rate of 110s while on general medicine floor with telemetry. EKG confirmed atrial fibrillation and stat CBC, BMP, troponin, PRBC, and chest x-ray ordered. General surgery consulted cardiology who recommended transfer to ICU or stepdown and initiation of cardizem infusion. Critical care medicine was consulted for paroxysmal atrial fibrillation and s/p laparascopic cholecystectomy by surgery team. Patient seen and examined in ICU bed 1. Patient lying in bed with eyes closed and responsive to voice. She denies any chest pain, shortness of breath, cough, vision changes, nausea, palpitations, lightheadedness, or dizziness. She appears to be hemodynamically stable with blood pressure of 110/53, MAP 81, heart rate 85, respiratory rate 18, and SPO2 99% on 2.5 lpm of oxygen. She denies any complaints of pain at this time.      Last Recorded Vitals:  Vitals:    06/09/24 0300 06/09/24 0400 06/09/24 0500 06/09/24 0700   BP: 112/90 117/51 126/56    Pulse: 69 69 70    Resp: 16 16 16    Temp:  36.4 °C (97.5 °F)  36.8 °C (98.2 °F)   TempSrc:    Temporal   SpO2: 97% 96% 97%    Weight:       Height:           Last Labs:  CBC - 6/9/2024:  5:24 AM  5.5 8.1 227    23.9      CMP - 6/9/2024:  5:24 AM  8.5 5.6 24 --- 1.0   3.3 3.4 35 102       PTT - 6/6/2024:  6:51 AM  1.1   11.9 31     Troponin I, High Sensitivity   Date/Time Value Ref Range Status   06/08/2024 05:04 PM 13 0 - 13 ng/L Final   06/06/2024 03:21 PM 9 0 - 13 ng/L Final   05/19/2024 09:10 PM 98 (HH) 0 - 13 ng/L Final     Comment:     Previous result verified on 5/19/2024 1336 on specimen/case [id number] called with component [facility] for procedure Troponin, High Sensitivity, 1 Hour with value 63 ng/L.     BNP   Date/Time Value Ref Range Status   04/22/2024 04:01  (H) 0 - 99 pg/mL Final   04/13/2024 01:33  (H) 0 - 99 pg/mL Final     Hemoglobin A1C   Date/Time Value Ref Range Status   03/14/2024 06:28 AM 6.3 (H) 4.3 - 5.6 % Final     Comment:     American Diabetes Association guidelines indicate that patients with HgbA1c in the range 5.7-6.4% are at increased risk for development of diabetes, and intervention by lifestyle modification may be beneficial. HgbA1c greater or equal to 6.5% is considered diagnostic of diabetes.   02/26/2024 09:46 AM 6.6 (H) see below % Final   09/26/2022 10:24 AM 5.9 (A) % Final     Comment:          Diagnosis of Diabetes-Adults   Non-Diabetic: < or = 5.6%   Increased risk for developing diabetes: 5.7-6.4%   Diagnostic of diabetes: > or = 6.5%  .       Monitoring of Diabetes                Age (y)     Therapeutic Goal (%)   Adults:          >18           <7.0   Pediatrics:    13-18           <7.5                   7-12           <8.0                   0- 6            7.5-8.5   American Diabetes Association. Diabetes Care 33(S1), Jan 2010.     04/14/2022 02:10 PM 7.3 (A) % Final     Comment:          Diagnosis of Diabetes-Adults   Non-Diabetic: < or = 5.6%   Increased risk for developing diabetes: 5.7-6.4%   Diagnostic of diabetes: > or = 6.5%  .       Monitoring of Diabetes                Age (y)     Therapeutic Goal (%)   Adults:          >18           <7.0   Pediatrics:    13-18           <7.5                   7-12           <8.0                    0- 6            7.5-8.5   American Diabetes Association. Diabetes Care 33(S1), Jan 2010.     03/06/2021 01:52 AM 5.8 (A) % Final     Comment:     Normal less than 5.7%  Prediabetes 5.7% to 6.4%  Diabetes 6.5% or higher      --HgbA1C levels may not be accurate in patients who have  renal disease, received recent blood transfusions, are anemic,  or who have dyshemoglobinemia.     VLDL   Date/Time Value Ref Range Status   05/19/2023 11:17 AM 15 0 - 40 mg/dL Final   09/26/2022 10:24 AM 22 0 - 40 mg/dL Final   04/15/2022 10:27 AM 9 0 - 40 mg/dL Final      Last I/O:  I/O last 3 completed shifts:  In: 2318.3 (28.3 mL/kg) [I.V.:1068.3 (13 mL/kg); Blood:350; Other:400; IV Piggyback:500]  Out: 3075 (37.5 mL/kg) [Urine:670 (0.2 mL/kg/hr); Drains:5; Other:2400]  Weight: 81.9 kg     Past Cardiology Tests (Last 3 Years):  EKG:  ECG 12 Lead 06/06/2024 (Preliminary)      ECG 12 lead 05/19/2024      ECG 12 lead 04/22/2024      ECG 12 lead 04/18/2024      Electrocardiogram, 12-lead PRN ACS symptoms 04/14/2024      ECG 12 lead 04/13/2024      ECG 12 lead 02/25/2024      ECG 12 lead 02/14/2024      ECG 12 lead 02/07/2024    Echo:  Transthoracic Echo (TTE) Complete 02/15/2024    Ejection Fractions:  EF   Date/Time Value Ref Range Status   02/15/2024 02:13 PM 67 %      Cath:  No results found for this or any previous visit from the past 1095 days.    Stress Test:  Nuclear Stress Test 04/08/2022    Cardiac Imaging:  No results found for this or any previous visit from the past 1095 days.      Past Medical History:  She has a past medical history of Abnormal levels of other serum enzymes, Acute kidney failure (CMS-HCC), Anemia, CKD (chronic kidney disease), COPD (chronic obstructive pulmonary disease) (Multi), Coronary artery disease, Disease of blood and blood-forming organs, unspecified, HLD (hyperlipidemia), Hypertension, MDS (myelodysplastic syndrome) (Multi), Personal history of other diseases of the musculoskeletal system and  connective tissue, Personal history of other specified conditions, Personal history of other specified conditions, and Type 2 diabetes mellitus (Multi).    She has no past medical history of Adverse effect of anesthesia, Arthritis, Asthma (HHS-HCC), Awareness under anesthesia, CHF (congestive heart failure) (Multi), Delayed emergence from general anesthesia, Disease of thyroid gland, Hard to intubate, History of transfusion, Malignant hyperthermia, PONV (postoperative nausea and vomiting), Pseudocholinesterase deficiency, Spinal headache, or Stroke (Multi).    Past Surgical History:  She has a past surgical history that includes Other surgical history (12/13/2019); Other surgical history (12/13/2019); Other surgical history (Bilateral, 12/13/2019); Other surgical history (Left, 12/13/2019); Other surgical history (12/13/2019); Other surgical history (12/13/2019); Other surgical history (Right, 12/13/2019); Other surgical history (04/16/2021); MR angio head wo IV contrast (11/20/2020); MR angio neck wo IV contrast (11/20/2020); Breast biopsy (Left); Hysterectomy; Breast lumpectomy; Appendectomy; Colonoscopy; Oophorectomy; and Joint replacement.      Social History:  She reports that she has never smoked. She has never used smokeless tobacco. She reports that she does not currently use alcohol. She reports that she does not use drugs.    Family History:  No family history on file.     Allergies:  Codeine, Hydrocodone, Hydrocodone-acetaminophen, Meperidine (pf), Tramadol, Piperacillin-tazobactam, Adhesive tape-silicones, and Meperidine    Inpatient Medications:  Scheduled medications   Medication Dose Route Frequency    acetaminophen  975 mg oral q8h    ascorbic acid  500 mg oral Daily    carvedilol  25 mg oral BID    cholecalciferol  2,000 Units oral Daily    ciprofloxacin  400 mg intravenous Daily    cyanocobalamin  1,000 mcg oral Daily    folic acid  1 mg oral Daily    heparin (porcine)  5,000 Units subcutaneous q8h     heparin  2,000 Units intra-catheter After Dialysis    heparin  2,000 Units intra-catheter After Dialysis    insulin lispro  0-10 Units subcutaneous TID    losartan  50 mg oral Daily    metroNIDAZOLE  500 mg intravenous q8h    multivitamin with minerals  1 tablet oral Daily    oxygen   inhalation Continuous - Inhalation    pravastatin  40 mg oral Nightly    pregabalin  100 mg oral BID    zinc sulfate  50 mg of elemental zinc oral Daily     PRN medications   Medication    dextrose    dextrose    glucagon    glucagon    heparin flush    HYDROmorphone    ondansetron    oxyCODONE     Continuous Medications   Medication Dose Last Rate     Outpatient Medications:  Current Outpatient Medications   Medication Instructions    amLODIPine (NORVASC) 10 mg, oral, Daily    carvedilol (COREG) 12.5 mg, oral, 2 times daily    cholecalciferol (Vitamin D-3) 50 MCG (2000 UT) tablet 1 tablet, oral, Daily    cyanocobalamin (Vitamin B-12) 1,000 mcg tablet 1 tablet, oral, Daily    losartan (COZAAR) 50 mg, oral, Daily    pioglitazone (ACTOS) 15 mg, oral, Daily    pravastatin (PRAVACHOL) 40 mg, oral, Nightly    pregabalin (Lyrica) 100 mg capsule TAKE ONE CAPSULE BY MOUTH TWICE DAILY @9AM & 5PM    SITagliptin phosphate (JANUVIA) 25 mg, oral, Daily       Physical Exam:  Constitutional:       General: She is not in acute distress.     Appearance: She is obese. She is ill-appearing.      Interventions: Nasal cannula in place.   HENT:      Head: Normocephalic.      Nose: Nose normal.      Mouth/Throat:      Mouth: Mucous membranes are moist.   Eyes:      Extraocular Movements: Extraocular movements intact.      Pupils: Pupils are equal, round, and reactive to light.   Cardiovascular:      Rate and Rhythm: Normal rate. Rhythm irregular.      Pulses: Normal pulses.      Heart sounds: Normal heart sounds. No murmur heard.  Pulmonary:      Effort: Pulmonary effort is normal. No respiratory distress.      Breath sounds: Decreased breath sounds  (slightly diminished breath sounds bilateral bases) present.   Abdominal:      General: Bowel sounds are normal.      Palpations: Abdomen is soft.      Tenderness: There is abdominal tenderness (minimal tenderness with palpation to RUQ).   Musculoskeletal:      Cervical back: Normal range of motion.      Right lower leg: No edema.      Left lower leg: No edema.   Skin:     General: Skin is warm and dry.      Capillary Refill: Capillary refill takes less than 2 seconds.      Coloration: Skin is pale.      Findings: Bruising present.      Comments: Laparascopic surgical sites well-approximated, clean, and dry. Drain to right-side abdomen.   Neurological:      General: No focal deficit present.      Mental Status: She is alert, oriented to person, place, and time and easily aroused. Mental status is at baseline.      Cranial Nerves: Cranial nerves 2-12 are intact.      Motor: Weakness (generalized weakness) present.   Psychiatric:         Attention and Perception: Attention normal.         Mood and Affect: Mood normal.         Speech: Speech normal.         Behavior: Behavior normal.         Assessment/Plan   1) Atrial fib with RVR  Now in NSR  Increase Coreg  Needs anticoagulation when ok with Surgery (Start Eliquis 5mg BID)  Echo in 2/2024 with NSR  Peripheral IV 06/06/24 20 G Left;Posterior Hand (Active)   Site Assessment Clean;Dry;Intact 06/09/24 0400   Dressing Type Transparent 06/09/24 0400   Line Status Saline locked 06/09/24 0400   Dressing Status Clean;Occlusive;Dry 06/09/24 0400   Number of days: 3       Peripheral IV 06/08/24 22 G Left Antecubital (Active)   Site Assessment Clean;Dry;Intact 06/09/24 0400   Dressing Type Transparent 06/09/24 0400   Line Status Saline locked 06/09/24 0400   Dressing Status Clean;Dry;Occlusive 06/09/24 0400   Number of days: 1       Peripheral IV 06/08/24 20 G Distal;Left;Anterior Forearm (Active)   Site Assessment Clean;Dry;Intact 06/09/24 0400   Dressing Type Transparent  06/09/24 0400   Line Status Saline locked 06/09/24 0400   Dressing Status Clean;Dry;Occlusive 06/09/24 0400   Number of days: 1       Hemodialysis Cath Right Subclavian (Active)   Site Assessment Clean;Dry;Intact 06/09/24 0400   Dressing Type Transparent 06/09/24 0400   Dressing Status Clean;Dry;Occlusive 06/09/24 0400   Number of days:        Code Status:  Full Code    I spent 30 minutes in the professional and overall care of this patient.        Facundo Martínez MD

## 2024-06-09 NOTE — PROGRESS NOTES
Tana Hargrove [id number]   Service: Internal Medicine / Hospitalist Date of service: 6/9/2024                          Full Code                    Subjective      History Of Present Illness:    Tana Hargrove is a 79 y.o. female with a significant past medical history of HTN, HLD, CAD, paroxysmal atrial fibrillation (not on AC d/t anemia and need for recurrent blood transfusion), HFpEF, COPD (no baseline O2), NIDDM2, ESRD on HD, chronic anemia requiring Procrit and intermittent PRBC infusions (in setting of MDS and ESRD), OA, anxiety / depression, GERD, and chronic cholecystitis who presented 06/06/24 for laparoscopic fenestrated subtotal chidi I/s/o acute on chronic cholecystitis. Patient current on Hospital Day #2, being followed by surgery (primary) and nephrology (for HD). Medicine was consulted for perioperative medical management and assistance with management of atrial fibrillation with RVR, acute on chronic anemia. Cardiology also consulted on HD #2.     Patient seen and examined awaiting transfer from  --> ICU in setting of AF RVR, acute on chronic anemia.  Patient reports that she has felt generally weak, rundown, and has had overall very poor appetite and intake following her surgery.  She also admits to an ongoing degree of mild abdominal pain and discomfort which contributes to her not wanting to take in as much.  Very mild nausea, no emesis.  Since her operation, she has been on oxygen which she does not normally wear at home, but it has been slowly decreasing in the amount she needs and she does not report feeling acutely short of breath or winded.  She denies any chest pain or pressure, palpitations, dizziness or lightheadedness, diaphoresis, headache, vision changes, focal or lateralizing sensorimotor deficits.  She did undergo dialysis today, and felt that that went well.  Overall she feels a bit discouraged regarding how she feels right now.      6/9................No reported : palpitations,  headaches, syncope, chest pains, nausea or vomiting.Patient endorsed mild abdominal discomfort but voiced no other complaints.      Review of Systems:   Review of system otherwise negative if not aforementioned above in subjective.    Objective      Physical Exam     Constitutional:       Appearance: Patient appeared in no acute cardiopulmonary distress.     Comments: Patient alert and oriented to person place time and situation.Patient appeared nontoxic.  HEENT:      Head: Normocephalic and atraumatic.Trachea midline      Nose:No observed congestion or rhinorrhea.     Mouth/Throat: Mucous membranes Moist, Trachea appeared  midline.  Eyes:      Extraocular Movements: Extraocular movements intact.      Pupils: Pupils are equal, round, and reactive to light.      Comments: No scleral icterus or conjunctival injection appreciated.   Cardiovascular:      Rate and Rhythm: Normal rate and regular rhythm. No clicks rubs or gallops, normal S1 and S2.No peripheral stigmata of endocarditis appreciated.     Pulmonary:      Lungs appeared clear to auscultation, no adventitious sound appreciated.  Abdominal:      General: Abdomen soft, nontender, active bowel sounds, no involuntary guarding or rebound tenderness appreciated.     Comments: Mild tenderness on palpation, right upper quadrant.  Musculoskeletal:       Patient appeared to have full active range of motion for upper and lower extremities, no acute apparent joint deformity appreciated on examination.   No pitting edema or cyanosis appreciated.       Lymphadenopathy:      No appreciable palpable lymphadenopathy  Skin:     General: Skin is warm.      Coloration:  No jaundice     Findings: No abnormal appearing skin rashes or lesions that appeared acute noted on unclothed area of the skin..   Neurological:      General: No focal sensory or motor deficits appreciated, no meningeal signs or dysmetria noted.      Cranial Nerves: Cranial nerves II to XII appearing grossly  intact.     Genitals:  Deferred  Psychiatric:         The patient appears to be displaying normal mood and affect at the time of evaluation.    Labs:     Lab Results   Component Value Date    GLUCOSE 112 (H) 06/09/2024    CALCIUM 8.5 (L) 06/09/2024     06/09/2024    K 4.0 06/09/2024    CO2 30 06/09/2024     06/09/2024    BUN 17 06/09/2024    CREATININE 1.59 (H) 06/09/2024      Lab Results   Component Value Date    WBC 5.5 06/09/2024    HGB 8.1 (L) 06/09/2024    HCT 23.9 (L) 06/09/2024    MCV 89 06/09/2024     06/09/2024      [unfilled]   [unfilled]   Susceptibility data from last 90 days.  Collected Specimen Info Organism Amoxicillin/Clavulanate Ampicillin Ampicillin/Sulbactam Cefazolin Cefazolin (uncomplicated UTIs only) Ciprofloxacin Gentamicin Levofloxacin Nitrofurantoin Piperacillin/Tazobactam   05/20/24 Blood culture from Peripheral Venipuncture Klebsiella pneumoniae/variicola  S  R  S  S   I  S  S   S   04/27/24 Fluid from Aspirate Klebsiella pneumoniae/variicola S R S S  I S S  S   04/14/24 Urine from Clean Catch/Voided Escherichia coli S R I S S S S  S S     Collected Specimen Info Organism Trimethoprim/Sulfamethoxazole   05/20/24 Blood culture from Peripheral Venipuncture Klebsiella pneumoniae/variicola  S   04/27/24 Fluid from Aspirate Klebsiella pneumoniae/variicola S   04/14/24 Urine from Clean Catch/Voided Escherichia coli S               X-rays/ Images    [unfilled]   Radiology Results (last 21 days)    Procedure Component Value Units Date/Time   XR chest 1 view [[id number]] Collected: 06/08/24 1816   Order Status: Completed Updated: 06/08/24 1816   Narrative:     Interpreted By:  Josefa Reyna,  STUDY:  XR CHEST 1 VIEW; ;  6/8/2024 4:44 pm      INDICATION:  Signs/Symptoms:Afib, POD2 s/p lap chidi.      COMPARISON:  05/19/2024      ACCESSION NUMBER(S):  [id number]      ORDERING CLINICIAN:  ANNIE HENRY      TECHNIQUE:  Portable chest      FINDINGS:  Tunneled right  chest dual lumen catheter unchanged, distal tip  projects in the right atrium. Right hemidiaphragm elevation is again  noted. Patchy left basal consolidation is increased. Increased  perihilar interstitial opacities. Probable small pleural effusions.  No pneumothorax. Unchanged cardiomediastinal silhouette. No acute  osseous finding.       Impression:     Probable interstitial edema/venous congestion and small pleural  effusions. Increased left basal opacities may be atelectasis or  pneumonia.          Signed by: Josefa Reyna 6/8/2024 6:15 PM  Dictation workstation:   [id number]   US gallbladder [[id number]] Collected: 05/19/24 1643   Order Status: Completed Updated: 05/19/24 5167   Narrative:     STUDY:  Right upper quadrant Ultrasound; 5/19/2024 4:28 PM  INDICATION:  Status post cholecystostomy, elevated LFTs.  COMPARISON:  CT A&P today, US gallbladder 4/18/2024, MR abdomen 4/18/2024.  ACCESSION NUMBER(S):  [id number]  ORDERING CLINICIAN:  CELIA WYNNE  TECHNIQUE:  Ultrasound of the right upper quadrant.  Multiple images of the right  upper quadrant were obtained.  FINDINGS:  The liver demonstrates normal echogenicity.       The gallbladder contains sludge as well as multiple stones.  There is  edematous wall thickening.  The cholecystectomy tube is visualized  within the gallbladder.  Sonographic Luna's sign is negative.       The common bile duct measures 0.3 cm.  There is no intrahepatic  biliary dilatation.       The pancreas is not well seen due to overlying bowel gas.     The right kidney measures 9.8 cm in length.  Renal cortical thinning  is present and echotexture is increased.  There is no hydronephrosis.  There are no stones.  There is a simple cyst measuring 1.2 x 1.0 x 0.8  cm within the midportion.   Impression:     Sludge and stones within the gallbladder with edematous wall  thickening.  No biliary dilatation is appreciated.  Increased echogenicity and thinning of the right renal cortex  consistent  with intrinsic renal disease.  No hydronephrosis.  Signed by Lonine Rodriguez MD             Medical Problems       Problem List       * (Principal) Chronic cholecystitis    Lumbago    A-fib (Multi)    Anxiety    Chronic diastolic heart failure of unknown etiology (Multi) (Chronic)    Cervical spondylosis without myelopathy    Coronary artery disease    Degeneration of intervertebral disc of lumbar region    Hypertension, essential (Chronic)    Frequent falls    GERD (gastroesophageal reflux disease) (Chronic)    Hyperlipidemia (Chronic)    Inability to ambulate due to multiple joints    Jerking movements of extremities    Spinal stenosis of lumbar region    Lumbar spondylosis    Macrocytic anemia    Myelodysplastic syndrome (Multi) (Chronic)    Myofascial pain syndrome    Occasional tremors    Osteoporosis    Stage 4 chronic kidney disease (Multi) (Chronic)    Weakness generalized    Gout    Scoliosis of lumbar region due to degenerative disease of spine in adult    Depression    Lower extremity edema    Diabetes mellitus with complication (Multi)    COPD without exacerbation (Multi) (Chronic)    Primary osteoarthritis of right knee    Fall    Acute kidney failure, unspecified (CMS-[facility])    Muscle weakness (generalized)    Primary osteoarthritis    Repeated falls    Paroxysmal atrial fibrillation (Multi)    Chronic diastolic (congestive) heart failure (Multi)    Chronic obstructive pulmonary disease, unspecified (Multi)    CKD (chronic kidney disease)    Normocytic anemia    Essential (primary) hypertension    HLD (hyperlipidemia)    Type 2 diabetes mellitus with hyperglycemia, without long-term current use of insulin (Multi)    Type 2 diabetes mellitus without complications (Multi)    Obesity (BMI 30-39.9)    Cellulitis of toe of right foot    Acute kidney injury (nontraumatic) (CMS-HCC)    ESRD (end stage renal disease) on dialysis (Multi) (Chronic)    Anemia due to chronic kidney disease, on chronic dialysis  (Multi) (Chronic)    Thickening of wall of gallbladder    Allergy, unspecified, sequela    Anaphylactic shock, unspecified, sequela    Articular cartilage disorder of hip    Bone marrow disorder    Coagulation defect, unspecified (Multi)    Contact with and (suspected) exposure to covid-19    Fracture of bone of hip (Multi)    History of anemia    Hyperkalemia    Pain of lower extremity    Pain, unspecified    Plantar fasciitis    Pneumonia due to infectious organism    Chest pain    Acute pulmonary edema (Multi)    Elevated bilirubin    Atrial fibrillation with rapid ventricular response (Multi)               Above medical problems may be reflective of historical medical problems that may have resolved and may not related to acute clinical condition/medical problems.    Clinical impression/plan:      Atrial Fibrillation with RVR   -Likely multifactorial in acute on chronic anemia, stress from surgical intervention, electrolyte derangements (hypomagnesemia has improved, hypophosphatemia on most recent labs), etc  -Overall patient feels weak, has continued abdominal discomfort from her surgery, and poor appetite; however, she is not endorsing any chest pain, palpitations, dizziness or lightheadedness, diaphoresis, or symptoms consistent with her A-fib as she has had these since her postoperative period began  -Patient will be continued on a Cardizem drip (most recent echocardiogram without evidence of LV dysfunction or WMAs), can continue home medications as p.o. intake improves.  May utilize pushes of IV metoprolol as needed if HR remains elevated despite cardizem   -Continue to trend electrolytes closely, monitor on telemetry.  -Will check TSH in the morning as well out of abundance of caution  -Patient is not on oral anticoagulation for A-fib in setting of her chronic anemia and need for blood transfusions  -Cardiology and critical care also consulted on this patient     Acute on chronic anemia, multifactorial in  setting of baseline ESRD, MDS, recent surgery  -Patient's baseline hemoglobin varies and she does frequently require PRBC infusions and Procrit; most recently she was in the low sevens  -Earlier today, the patient's hemoglobin was 6.6 --> she went for dialysis and the plan per the surgery team was to recheck her H&H and transfuse as needed at a later time (given that she does get MARILYNN/PRBCs with dialysis); however, with interval development of A-fib RVR she was transfused 1 unit of blood  -There has been no overt evidence of bleeding per patient or nursing, including hemoptysis, hematemesis, melena, hematochezia, etc.  -Will continue to trend H&H  -Transfusion goal as per nephrology and cardiology, but would recommend trying to maintain at least 7.0 pending their input     Chronic cholecystitis s/p laparoscopic fenestrated subtotal chidi (06/06/24), ongoing abdominal discomfort   -Patient is on a clear liquid diet but does not have much drive to have intake at this time due to ongoing abdominal discomfort  -She denies any specific nausea or vomiting  -She is utilizing incentive spirometry  -Majority of management including current antibiotics and nutrition as per primary service     HTN, HLD, CAD, Chronic HFpEF   -Blood pressure has been predominantly well-controlled throughout hospitalization  -As mentioned above, no current chest pain or anginal equivalents; is to be continued on home therapies  -Does not appear volume overloaded on examination currently, but does have some diminished breath sounds and CXR is showing evidence of some congestion.  Given diminished intake, will hold off on aggressive IV diuresis for therapies at this time, but moving forward may need to consider if volume status worsens versus adjusting volume status with HD  -Cardiology is following the patient as well     ESRD on HD  -Nephrology is following  -Continue home therapies as appropriate     COPD without acute exacerbation  -Patient is  slightly diminished on auscultation, but not overtly wheezing and does have fair aeration  -Continue home therapies with caution in regard to albuterol usage with A-fib  -Continue with incentive spirometry, pulmonary hygiene, ambulation as tolerated     NIDDM-II   -Agree with mild SSI as appetite and intake improves  -Continue with hypoglycemic protocol, Accu-Cheks  -Monitor and adjust as needed     Anxiety and depression   -Continue home therapies      Electrolyte derangements: Hypomagnesemia, hypophosphatemia  -Hypomagnesemia: Patient did have a low mag level at 1.37; however, was replaced and did rise to 1.68.  With A-fib history, would start additional replacement with goal to be around the 2.0 range  -Hypophosphatemia: Phos 3.1 this morning, following dialysis and with repeat electrolytes did downtrend to 1.9.  Will order replacement and recheck. Note: as ordering, order for replacement was just added. Repeat labs to follow.     Disposition/additional care plan/interventions: 6/9/2024      Tana Hargrove is a 79 y.o. female with a significant past medical history s/f HTN, HLD, CAD, paroxysmal atrial fibrillation (not on AC d/t anemia and need for recurrent blood transfusion), HFpEF,  NIDDM2, ESRD on HD, chronic anemia requiring Procrit and intermittent PRBC infusions (in setting of MDS and ESRD), OA, anxiety / depression, GERD, and chronic cholecystitis who presented 06/06/24 for laparoscopic fenestrated subtotal chidi I/s/o acute on chronic cholecystitis. Followed by surgery (primary) and nephrology (for HD). Medicine was consulted for perioperative medical management and assistance with management of atrial fibrillation with RVR, acute on chronic anemia. Cardiology  and Intensivist also consulted.    Continue current stewardship and postoperative care as per general surgery.    Patient cleared sinus rhythm overnight.    Continue renal replacement therapy as recommended by nephrology.    Continue to monitor  H&H closely.    Reported COPD history deemed spurious by intensivist/pulmonologist: Outpatient follow-up with family physician pulmonary function testing may be of value to further evaluate diagnosis.    Electrolyte derangement improved continue to monitor magnesium.    Will continue rate control therapy for atrial fibrillation anticoagulation therapy recommended once okay with general surgery.           The patient was informed of differential diagnosis , work up , plan of care and possible sequelae of clinical disposition.Patient in agreement with plan of care. Further recommendations forthcoming in accordance with patient's clinical disposition and response to care.    Discharge planning:Discharge time in accordance recommendations by general surgery    Care time: < 55 mins.           Dictation performed with assistance of voice recognition device therefore transcription errors are possible.

## 2024-06-09 NOTE — PROGRESS NOTES
Critical Care Daily Progress        Subjective   Patient is a 79 y.o. female admitted on 6/6/2024  5:47 AM with the following indication(s) for ICU care: None presently found     Interval History: Patient developed atrial fibrillation while recovering on the general surgical floor yesterday, was transferred to the intensive care unit hemodynamically stable with cardiology consultation.  Critical care medicine service was also consulted..     Complaints: has complaints food ordered 45 minutes ago and has not yet arrived..     Scheduled Medications:   acetaminophen, 975 mg, oral, q8h  ascorbic acid, 500 mg, oral, Daily  carvedilol, 25 mg, oral, BID  cholecalciferol, 2,000 Units, oral, Daily  ciprofloxacin, 400 mg, intravenous, Daily  cyanocobalamin, 1,000 mcg, oral, Daily  folic acid, 1 mg, oral, Daily  heparin (porcine), 5,000 Units, subcutaneous, q8h  heparin, 2,000 Units, intra-catheter, After Dialysis  heparin, 2,000 Units, intra-catheter, After Dialysis  insulin lispro, 0-10 Units, subcutaneous, TID  losartan, 50 mg, oral, Daily  metroNIDAZOLE, 500 mg, intravenous, q8h  multivitamin with minerals, 1 tablet, oral, Daily  oxygen, , inhalation, Continuous - Inhalation  pravastatin, 40 mg, oral, Nightly  pregabalin, 100 mg, oral, BID  zinc sulfate, 50 mg of elemental zinc, oral, Daily         Continuous Medications:         PRN Medications:   PRN medications: dextrose, dextrose, glucagon, glucagon, heparin flush, HYDROmorphone, ondansetron, oxyCODONE    Objective   Vitals:  Most Recent: Heart rate 74, sinus rhythm, respiratory rate 12, blood pressure 130/55.  Vitals:    06/09/24 0748   BP:    Pulse:    Resp:    Temp:    SpO2: 97%       24hr Min/Max:  Temp  Min: 36.4 °C (97.5 °F)  Max: 37.6 °C (99.7 °F)  Pulse  Min: 67  Max: 122  BP  Min: 93/48  Max: 137/72  Resp  Min: 16  Max: 25  SpO2  Min: 93 %  Max: 100 %        I/O:    Intake/Output Summary (Last 24 hours) at 6/9/2024 0805  Last data filed at 6/9/2024 0600  Gross  per 24 hour   Intake 2218.33 ml   Output 2870 ml   Net -651.67 ml       Physical Exam:   Physical Exam  Vitals reviewed.   Constitutional:       General: She is not in acute distress.     Appearance: She is not toxic-appearing.   HENT:      Head: Normocephalic and atraumatic.      Mouth/Throat:      Pharynx: No oropharyngeal exudate.   Eyes:      General: No scleral icterus.  Cardiovascular:      Rate and Rhythm: Normal rate and regular rhythm.      Heart sounds: No murmur heard.  Pulmonary:      Effort: No respiratory distress.      Breath sounds: No wheezing, rhonchi or rales.      Comments: A tunneled dialysis catheter is in the superior lateral right chest, tunneling medially  Abdominal:      Palpations: Abdomen is soft.      Comments: Abdominal binder, postoperative state   Musculoskeletal:      Cervical back: Neck supple.      Right lower leg: No edema.      Left lower leg: No edema.   Lymphadenopathy:      Cervical: No cervical adenopathy.   Skin:     Findings: No rash.   Neurological:      General: No focal deficit present.      Mental Status: She is alert.          Lab/Radiology/Diagnostic Review:  Results for orders placed or performed during the hospital encounter of 06/06/24 (from the past 24 hour(s))   POCT GLUCOSE   Result Value Ref Range    POCT Glucose 148 (H) 74 - 99 mg/dL   POCT GLUCOSE   Result Value Ref Range    POCT Glucose 145 (H) 74 - 99 mg/dL   CBC and Auto Differential   Result Value Ref Range    WBC 6.6 4.4 - 11.3 x10*3/uL    nRBC 0.0 0.0 - 0.0 /100 WBCs    RBC 2.38 (L) 4.00 - 5.20 x10*6/uL    Hemoglobin 7.2 (L) 12.0 - 16.0 g/dL    Hematocrit 21.8 (L) 36.0 - 46.0 %    MCV 92 80 - 100 fL    MCH 30.3 26.0 - 34.0 pg    MCHC 33.0 32.0 - 36.0 g/dL    RDW 22.5 (H) 11.5 - 14.5 %    Platelets 211 150 - 450 x10*3/uL    Neutrophils % 77.0 40.0 - 80.0 %    Immature Granulocytes %, Automated 0.3 0.0 - 0.9 %    Lymphocytes % 12.0 13.0 - 44.0 %    Monocytes % 9.2 2.0 - 10.0 %    Eosinophils % 1.2 0.0 -  6.0 %    Basophils % 0.3 0.0 - 2.0 %    Neutrophils Absolute 5.09 1.60 - 5.50 x10*3/uL    Immature Granulocytes Absolute, Automated 0.02 0.00 - 0.50 x10*3/uL    Lymphocytes Absolute 0.79 (L) 0.80 - 3.00 x10*3/uL    Monocytes Absolute 0.61 0.05 - 0.80 x10*3/uL    Eosinophils Absolute 0.08 0.00 - 0.40 x10*3/uL    Basophils Absolute 0.02 0.00 - 0.10 x10*3/uL   Phosphorus   Result Value Ref Range    Phosphorus 1.9 (L) 2.5 - 4.9 mg/dL   Magnesium   Result Value Ref Range    Magnesium 1.68 1.60 - 2.40 mg/dL   Basic Metabolic Panel   Result Value Ref Range    Glucose 143 (H) 74 - 99 mg/dL    Sodium 137 136 - 145 mmol/L    Potassium 3.6 3.5 - 5.3 mmol/L    Chloride 102 98 - 107 mmol/L    Bicarbonate 30 21 - 32 mmol/L    Anion Gap 9 (L) 10 - 20 mmol/L    Urea Nitrogen 11 6 - 23 mg/dL    Creatinine 1.20 (H) 0.50 - 1.05 mg/dL    eGFR 46 (L) >60 mL/min/1.73m*2    Calcium 8.7 8.6 - 10.3 mg/dL   Troponin I, High Sensitivity   Result Value Ref Range    Troponin I, High Sensitivity 13 0 - 13 ng/L   Morphology   Result Value Ref Range    RBC Morphology See Below     Polychromasia Mild     Hypochromia Mild     Ovalocytes Few     Basophilic Stippling Present    POCT GLUCOSE   Result Value Ref Range    POCT Glucose 131 (H) 74 - 99 mg/dL   TSH with reflex to Free T4 if abnormal   Result Value Ref Range    Thyroid Stimulating Hormone 1.91 0.44 - 3.98 mIU/L   CBC   Result Value Ref Range    WBC 5.5 4.4 - 11.3 x10*3/uL    nRBC 0.0 0.0 - 0.0 /100 WBCs    RBC 2.69 (L) 4.00 - 5.20 x10*6/uL    Hemoglobin 8.1 (L) 12.0 - 16.0 g/dL    Hematocrit 23.9 (L) 36.0 - 46.0 %    MCV 89 80 - 100 fL    MCH 30.1 26.0 - 34.0 pg    MCHC 33.9 32.0 - 36.0 g/dL    RDW 21.3 (H) 11.5 - 14.5 %    Platelets 227 150 - 450 x10*3/uL   Basic Metabolic Panel   Result Value Ref Range    Glucose 112 (H) 74 - 99 mg/dL    Sodium 136 136 - 145 mmol/L    Potassium 4.0 3.5 - 5.3 mmol/L    Chloride 101 98 - 107 mmol/L    Bicarbonate 30 21 - 32 mmol/L    Anion Gap 9 (L) 10 - 20  mmol/L    Urea Nitrogen 17 6 - 23 mg/dL    Creatinine 1.59 (H) 0.50 - 1.05 mg/dL    eGFR 33 (L) >60 mL/min/1.73m*2    Calcium 8.5 (L) 8.6 - 10.3 mg/dL   Magnesium   Result Value Ref Range    Magnesium 2.33 1.60 - 2.40 mg/dL   Phosphorus   Result Value Ref Range    Phosphorus 3.3 2.5 - 4.9 mg/dL     *Note: Due to a large number of results and/or encounters for the requested time period, some results have not been displayed. A complete set of results can be found in Results Review.     XR chest 1 view    Result Date: 6/8/2024  Interpreted By:  Josefa Reyna, STUDY: XR CHEST 1 VIEW; ;  6/8/2024 4:44 pm   INDICATION: Signs/Symptoms:Afib, POD2 s/p lap chidi.   COMPARISON: 05/19/2024   ACCESSION NUMBER(S): [id number]   ORDERING CLINICIAN: ANNIE HENRY   TECHNIQUE: Portable chest   FINDINGS: Tunneled right chest dual lumen catheter unchanged, distal tip projects in the right atrium. Right hemidiaphragm elevation is again noted. Patchy left basal consolidation is increased. Increased perihilar interstitial opacities. Probable small pleural effusions. No pneumothorax. Unchanged cardiomediastinal silhouette. No acute osseous finding.       Probable interstitial edema/venous congestion and small pleural effusions. Increased left basal opacities may be atelectasis or pneumonia.     Signed by: Josefa Reyna 6/8/2024 6:15 PM Dictation workstation:   [id number]    ECG 12 Lead    Result Date: 6/7/2024  Sinus rhythm Borderline prolonged FL interval Borderline prolonged QT interval    ECG 12 lead    Result Date: 5/25/2024  Sinus rhythm Minimal ST depression, lateral leads Prolonged QT interval See ED provider note for full interpretation and clinical correlation Confirmed by Terri Bermudez ([id number]) on 5/25/2024 1:12:33 PM    US gallbladder    Result Date: 5/19/2024  STUDY: Right upper quadrant Ultrasound; 5/19/2024 4:28 PM INDICATION: Status post cholecystostomy, elevated LFTs. COMPARISON: CT A&P today, US gallbladder  4/18/2024, MR abdomen 4/18/2024. ACCESSION NUMBER(S): [id number] ORDERING CLINICIAN: CELIA WYNNE TECHNIQUE: Ultrasound of the right upper quadrant.  Multiple images of the right upper quadrant were obtained. FINDINGS: The liver demonstrates normal echogenicity.   The gallbladder contains sludge as well as multiple stones.  There is edematous wall thickening.  The cholecystectomy tube is visualized within the gallbladder.  Sonographic Luna's sign is negative.    The common bile duct measures 0.3 cm.  There is no intrahepatic biliary dilatation.   The pancreas is not well seen due to overlying bowel gas.  The right kidney measures 9.8 cm in length.  Renal cortical thinning is present and echotexture is increased.  There is no hydronephrosis. There are no stones.  There is a simple cyst measuring 1.2 x 1.0 x 0.8 cm within the midportion.    Sludge and stones within the gallbladder with edematous wall thickening.  No biliary dilatation is appreciated. Increased echogenicity and thinning of the right renal cortex consistent with intrinsic renal disease.  No hydronephrosis. Signed by Lonnie Rodriguez MD    CT abdomen pelvis wo IV contrast    Result Date: 5/19/2024  STUDY: CT Abdomen and Pelvis without IV Contrast; 05/19/2024, 1:35 PM. INDICATION: Signs/Symptoms:Gallbladder surgery, elevated liver enzymes and lipase from baseline. COMPARISON: XR pelvis: 05/19/24, 04/23/24; IR Biliary: 04/27/24; MR abdomen: 04/14/24; US gallbladder: 04/18/24. ACCESSION NUMBER(S): [id number] ORDERING CLINICIAN: CELIA WYNNE TECHNIQUE: CT of the abdomen and pelvis was performed.  Contiguous axial images were obtained at 3 mm slice thickness through the abdomen and pelvis. Coronal and sagittal reconstructions at 3 mm slice thickness were performed. No intravenous contrast was administered.  Automated mA/kV exposure control was utilized and patient examination was performed in strict accordance with principles of ALARA. FINDINGS: Please  note that the evaluation of vessels, lymph nodes and organs is limited without intravenous contrast.  LOWER CHEST: No cardiomegaly.  No pericardial effusion.  Atelectatic changes are noted.  Venous catheter terminates in the RIGHT atrium. Atherosclerosis of the thoracic aorta and coronary arteries is present.  ABDOMEN:  LIVER: No hepatomegaly.  Smooth surface contour.  Normal attenuation.  BILE DUCTS: No intrahepatic or extrahepatic biliary ductal dilatation.  GALLBLADDER: Percutaneously placed drainage tube noted within the maria isabel hepatis. This appears to be a colostomy tube with decompressed gallbladder versus tubing status post cholecystectomy with residual fluid within the gallbladder fossa.  Clinical correlation for operative history recommended. STOMACH: No abnormalities identified.  PANCREAS: No masses or ductal dilatation.  SPLEEN: No splenomegaly or focal splenic lesion.  ADRENAL GLANDS: No thickening or nodules.  KIDNEYS AND URETERS: Kidneys are normal in size and location.  No renal or ureteral calculi.  PELVIS:  BLADDER: No abnormalities identified.  REPRODUCTIVE ORGANS: Patient is status post hysterectomy. Scattered phleboliths are present within the pelvis.  BOWEL: No abnormalities identified.  VESSELS: No abnormalities identified.  Atherosclerosis of the abdominal aorta and iliac arteries is noted.    PERITONEUM/RETROPERITONEUM/LYMPH NODES: No free fluid.  No pneumoperitoneum. No lymphadenopathy.  ABDOMINAL WALL: No abnormalities identified.  Midline incision scar noted. SOFT TISSUES: No abnormalities identified.   BONES: Patient is status post RIGHT femoral ORIF.  Patient is status post multilevel posterior fusion of L2-L5.  Intervertebral disc spacers at L5-S1 also noted.  Dextroconvex scoliosis of the lumbar spine noted. Moderate degenerative change of the hips demonstrated. Senescent changes are present within the lumbar spine.  No acute fracture or aggressive osseous lesion.    Percutaneous  drainage tube noted within the maria isabel hepatis. This appears to be related to a decompressed gallbladder versus positioning status post cholecystectomy with residual fluid within the gallbladder fossa. Clinical correlation for operative history recommended. Signed by Abhi Godoy II, MD    XR pelvis 1-2 views    Result Date: 5/19/2024  STUDY: Pelvis Radiographs; 5/19/2024 10:54AM INDICATION: Fall. COMPARISON: 4/23/2024 XR Pelvis. ACCESSION NUMBER(S): [id number] ORDERING CLINICIAN: CELIA WYNNE TECHNIQUE:  One view(s) of the pelvis. FINDINGS:  The pelvic ring is intact.  There is no acute fracture.  Postsurgical changes of the proximal right femur without evidence of hardware malfunction.  Mild degenerative change of both hips.    No acute osseous abnormality. Signed by Lonnie Rodriguez MD    XR chest 1 view    Result Date: 5/19/2024  STUDY: Chest Radiograph;  5/19/2024 10:54AM INDICATION: Fall. COMPARISON: 4/23/2024 XR Chest. ACCESSION NUMBER(S): [id number] ORDERING CLINICIAN: CELIA WYNNE TECHNIQUE:  Frontal chest was obtained at 11:53 hours. FINDINGS: CARDIOMEDIASTINAL SILHOUETTE: Cardiomediastinal silhouette is normal in size and configuration.  LUNGS: Lungs are clear.  Right-sided dialysis catheter with tip near the cavoatrial junction.  Elevation of the right hemidiaphragm.  ABDOMEN: No remarkable upper abdominal findings.  BONES: No acute osseous changes.    No acute process. Signed by Lonnie Rodriguez MD                     Assessment/Plan   Principal Problem:    Chronic cholecystitis  Active Problems:    Chronic diastolic heart failure of unknown etiology (Multi)    Hypertension, essential    GERD (gastroesophageal reflux disease)    Hyperlipidemia    Myelodysplastic syndrome (Multi)    COPD without exacerbation (Multi) [probably a spurious diagnosis]    ESRD (end stage renal disease) on dialysis (Multi)    Anemia due to chronic kidney disease, on chronic dialysis (Multi)      Patient with history of  "paroxysmal atrial fibrillation.  Had about yesterday in the postoperative state after cholecystectomy.  Seen by cardiology.  Transiently on a Cardizem infusion.  Seen by Dr. Martínez this morning.  Currently in sinus rhythm and asymptomatic and hemodynamically stable.  Not anticoagulated due to prior bleeding episodes.  Currently anemic.  Advanced age of 79 with atrial fibrillation yields a very high stroke risk, unfortunately.    ESRD with associated anemia.  Also apparently has a myelodysplastic syndrome.  Patient notes she has been on dialysis \"a couple of months.\"  Presumably gets erythropoietin periodically per nephrologist.    Patient labeled as having COPD.  Probably a spurious diagnosis, patient with a negative smoking history.  Could have elective pulmonary function tests.    At the present time I do not identify a need for continued critical care medicine services.  Will defer care to hospitalist, cardiologist, and surgeon.  Please call back if patient's condition changes.    Dragon dictation software was used to dictate this note and thus there may be minor errors in translation/transcription including garbled speech or misspellings. Please contact for clarification if needed.     Saurabh Mcqueen MD   "

## 2024-06-09 NOTE — CONSULTS
Critical Care Medicine Consult      Reason For Consult  Paroxysmal atrial fibrillation, s/p lap chidi    History Of Present Illness  Tana Hargrove is a 79 y.o. female with past medical history of ESRD on HD (TTS schedule), hypertension, hyperlipidemia, CAD, HFpEF, COPD (no baseline O2), NIDDM2, paroxysmal atrial fibrillation ( not on anticoagulation d/t anemia and need for recurrent blood transfusions), chronic anemia of ESRD and myelodysplastic syndrome requiring MARILYNN and intermittent PRBC infusions, osteoarthritis, anxiety, depression, GERD, and chronic cholecystitis presented on 06/06/24 for laparoscopic fenestrated subtotal cholecystectomy. Patient is currently on hospital day #2 and being followed by surgery (primary), nephrology (for HD), and internal medicine. Earlier this evening, patient noted to have developed atrial fibrillation with RVR on telemetry with heart rate of 110s while on general medicine floor with telemetry. EKG confirmed atrial fibrillation and stat CBC, BMP, troponin, PRBC, and chest x-ray ordered. General surgery consulted cardiology who recommended transfer to ICU or stepdown and initiation of cardizem infusion. Critical care medicine was consulted for paroxysmal atrial fibrillation and s/p laparascopic cholecystectomy by surgery team. Patient seen and examined in ICU bed 1. Patient lying in bed with eyes closed and responsive to voice. She denies any chest pain, shortness of breath, cough, vision changes, nausea, palpitations, lightheadedness, or dizziness. She appears to be hemodynamically stable with blood pressure of 110/53, MAP 81, heart rate 85, respiratory rate 18, and SPO2 99% on 2.5 lpm of oxygen. She denies any complaints of pain at this time. Will monitor in ICU overnight and critical care will likely sign off in morning and defer medical management to surgery, IMS, and cardiology.    Past Medical History:   Diagnosis Date    Abnormal levels of other serum enzymes     Elevated  liver enzymes    Acute kidney failure (CMS-HCC)     Anemia     CKD (chronic kidney disease)     COPD (chronic obstructive pulmonary disease) (Multi)     Coronary artery disease     Disease of blood and blood-forming organs, unspecified     Bone marrow disorder    HLD (hyperlipidemia)     Hypertension     MDS (myelodysplastic syndrome) (Multi)     Personal history of other diseases of the musculoskeletal system and connective tissue     History of muscle pain    Personal history of other specified conditions     History of insomnia    Personal history of other specified conditions     History of edema    Type 2 diabetes mellitus (Multi)      Past Surgical History:   Procedure Laterality Date    APPENDECTOMY      BREAST BIOPSY Left     left excisional    BREAST LUMPECTOMY      COLONOSCOPY      HYSTERECTOMY      JOINT REPLACEMENT      MR HEAD ANGIO WO IV CONTRAST  11/20/2020    MR HEAD ANGIO WO IV CONTRAST 11/20/2020 [facility]    MR NECK ANGIO WO IV CONTRAST  11/20/2020    MR NECK ANGIO WO IV CONTRAST 11/20/2020 [facility] CL[facility]ICAL [facility]GAC[facility]    OOPHORECTOMY      OTHER SURGICAL HISTORY  12/13/2019    Oophorectomy bilateral    OTHER SURGICAL HISTORY  12/13/2019    Tubal ligation    OTHER SURGICAL HISTORY Bilateral 12/13/2019    Knee replacement    OTHER SURGICAL HISTORY Left 12/13/2019    Shoulder surgery    OTHER SURGICAL HISTORY  12/13/2019    Hysterectomy    OTHER SURGICAL HISTORY  12/13/2019    Lumpectomy    OTHER SURGICAL HISTORY Right 12/13/2019    Foot surgery    OTHER SURGICAL HISTORY  04/16/2021    Back surgery     Medications Prior to Admission   Medication Sig Dispense Refill Last Dose    amLODIPine (Norvasc) 10 mg tablet Take 1 tablet (10 mg) by mouth once daily.   6/5/2024    carvedilol (Coreg) 12.5 mg tablet Take 1 tablet (12.5 mg) by mouth 2 times a day.   6/5/2024    cholecalciferol (Vitamin D-3) 50 MCG (2000 UT) tablet Take 1 tablet (2,000 Units) by mouth once daily.   6/5/2024    cyanocobalamin  (Vitamin B-12) 1,000 mcg tablet Take 1 tablet (1,000 mcg) by mouth once daily.   6/5/2024    losartan (Cozaar) 50 mg tablet Take 1 tablet (50 mg) by mouth once daily.   6/5/2024    pioglitazone (Actos) 15 mg tablet Take 1 tablet (15 mg) by mouth once daily. 30 tablet 3 6/5/2024    pravastatin (Pravachol) 40 mg tablet Take 1 tablet (40 mg) by mouth once daily at bedtime. 90 tablet 1 6/5/2024    pregabalin (Lyrica) 100 mg capsule TAKE ONE CAPSULE BY MOUTH TWICE DAILY @9AM & 5PM 6 capsule 0 6/5/2024    SITagliptin phosphate (Januvia) 25 mg tablet Take 1 tablet (25 mg) by mouth once daily. 30 tablet 3 6/5/2024     Codeine, Hydrocodone, Hydrocodone-acetaminophen, Meperidine (pf), Tramadol, Piperacillin-tazobactam, Adhesive tape-silicones, and Meperidine  Social History     Tobacco Use    Smoking status: Never    Smokeless tobacco: Never   Vaping Use    Vaping status: Never Used   Substance Use Topics    Alcohol use: Not Currently    Drug use: Never     No family history on file.    Scheduled Medications:   acetaminophen, 975 mg, oral, q8h  ascorbic acid, 500 mg, oral, Daily  carvedilol, 12.5 mg, oral, BID  cholecalciferol, 2,000 Units, oral, Daily  ciprofloxacin, 400 mg, intravenous, Daily  cyanocobalamin, 1,000 mcg, oral, Daily  [START ON 6/9/2024] folic acid, 1 mg, oral, Daily  heparin (porcine), 5,000 Units, subcutaneous, q8h  heparin, 2,000 Units, intra-catheter, After Dialysis  heparin, 2,000 Units, intra-catheter, After Dialysis  insulin lispro, 0-10 Units, subcutaneous, TID  losartan, 50 mg, oral, Daily  magnesium sulfate, 2 g, intravenous, Once  metroNIDAZOLE, 500 mg, intravenous, q8h  multivitamin with minerals, 1 tablet, oral, Daily  oxygen, , inhalation, Continuous - Inhalation  potassium phosphate, 21 mmol, intravenous, Once  pravastatin, 40 mg, oral, Nightly  pregabalin, 100 mg, oral, BID  zinc sulfate, 50 mg of elemental zinc, oral, Daily         Continuous Medications:   dilTIAZem, 5 mg/hr, Last Rate: 5  mg/hr (06/08/24 1805)         PRN Medications:   PRN medications: dextrose, dextrose, glucagon, glucagon, heparin flush, HYDROmorphone, ondansetron, oxyCODONE    Review of Systems:  Review of Systems   Gastrointestinal:         Post-surgical tenderness/pain   All other systems reviewed and are negative.       Objective   Vitals:  Most Recent:  Vitals:    06/08/24 1925   BP:    Pulse:    Resp:    Temp: 37.1 °C (98.8 °F)   SpO2:        24hr Min/Max:  Temp  Min: 36.3 °C (97.3 °F)  Max: 37.6 °C (99.7 °F)  Pulse  Min: 74  Max: 122  BP  Min: 123/68  Max: 139/63  Resp  Min: 18  Max: 25  SpO2  Min: 93 %  Max: 99 %    LDA:   Closed/Suction Drain 1 Right RLQ Bulb 10 Fr. (Active)   Placement Date/Time: 06/06/24 1323   Placed by: dr cori mars  Hand Hygiene Completed: Yes  Tube Number: 1  Orientation: Right  Location: RLQ  Drain Tube Type: Bulb  Size (Fr.): 10 Fr.  Drain Reservoir Size (mL): 100 mL   Number of days: 2       External Urinary Catheter (Active)   Placement Date/Time: 06/07/24 2005     Number of days: 1       Hemodialysis Cath Right Subclavian (Active)   No placement date or time found.   Orientation: Right  Access Location: Subclavian   Number of days:          Vent settings:       Hemodynamic parameters for last 24 hours:         Intake/Output Summary (Last 24 hours) at 6/8/2024 2022  Last data filed at 6/8/2024 1717  Gross per 24 hour   Intake 1700 ml   Output 2855 ml   Net -1155 ml           Physical exam:    Physical Exam  Constitutional:       General: She is not in acute distress.     Appearance: She is obese. She is ill-appearing.      Interventions: Nasal cannula in place.   HENT:      Head: Normocephalic.      Nose: Nose normal.      Mouth/Throat:      Mouth: Mucous membranes are moist.   Eyes:      Extraocular Movements: Extraocular movements intact.      Pupils: Pupils are equal, round, and reactive to light.   Cardiovascular:      Rate and Rhythm: Normal rate. Rhythm irregular.      Pulses: Normal  pulses.      Heart sounds: Normal heart sounds. No murmur heard.  Pulmonary:      Effort: Pulmonary effort is normal. No respiratory distress.      Breath sounds: Decreased breath sounds (slightly diminished breath sounds bilateral bases) present.   Abdominal:      General: Bowel sounds are normal.      Palpations: Abdomen is soft.      Tenderness: There is abdominal tenderness (minimal tenderness with palpation to RUQ).   Musculoskeletal:      Cervical back: Normal range of motion.      Right lower leg: No edema.      Left lower leg: No edema.   Skin:     General: Skin is warm and dry.      Capillary Refill: Capillary refill takes less than 2 seconds.      Coloration: Skin is pale.      Findings: Bruising present.      Comments: Laparascopic surgical sites well-approximated, clean, and dry. Drain to right-side abdomen.   Neurological:      General: No focal deficit present.      Mental Status: She is alert, oriented to person, place, and time and easily aroused. Mental status is at baseline.      Cranial Nerves: Cranial nerves 2-12 are intact.      Motor: Weakness (generalized weakness) present.   Psychiatric:         Attention and Perception: Attention normal.         Mood and Affect: Mood normal.         Speech: Speech normal.         Behavior: Behavior normal.          Lab/Radiology/Diagnostic Review:  Results for orders placed or performed during the hospital encounter of 06/06/24 (from the past 24 hour(s))   POCT GLUCOSE   Result Value Ref Range    POCT Glucose 111 (H) 74 - 99 mg/dL   CBC and Auto Differential   Result Value Ref Range    WBC 6.7 4.4 - 11.3 x10*3/uL    nRBC 0.3 (H) 0.0 - 0.0 /100 WBCs    RBC 2.32 (L) 4.00 - 5.20 x10*6/uL    Hemoglobin 6.9 (L) 12.0 - 16.0 g/dL    Hematocrit 21.3 (L) 36.0 - 46.0 %    MCV 92 80 - 100 fL    MCH 29.7 26.0 - 34.0 pg    MCHC 32.4 32.0 - 36.0 g/dL    RDW 22.9 (H) 11.5 - 14.5 %    Platelets 208 150 - 450 x10*3/uL    Neutrophils % 72.0 40.0 - 80.0 %    Immature  Granulocytes %, Automated 0.4 0.0 - 0.9 %    Lymphocytes % 16.6 13.0 - 44.0 %    Monocytes % 10.5 2.0 - 10.0 %    Eosinophils % 0.4 0.0 - 6.0 %    Basophils % 0.1 0.0 - 2.0 %    Neutrophils Absolute 4.83 1.60 - 5.50 x10*3/uL    Immature Granulocytes Absolute, Automated 0.03 0.00 - 0.50 x10*3/uL    Lymphocytes Absolute 1.12 0.80 - 3.00 x10*3/uL    Monocytes Absolute 0.71 0.05 - 0.80 x10*3/uL    Eosinophils Absolute 0.03 0.00 - 0.40 x10*3/uL    Basophils Absolute 0.01 0.00 - 0.10 x10*3/uL   Basic Metabolic Panel   Result Value Ref Range    Glucose 126 (H) 74 - 99 mg/dL    Sodium 137 136 - 145 mmol/L    Potassium 4.3 3.5 - 5.3 mmol/L    Chloride 103 98 - 107 mmol/L    Bicarbonate 30 21 - 32 mmol/L    Anion Gap 8 (L) 10 - 20 mmol/L    Urea Nitrogen 21 6 - 23 mg/dL    Creatinine 1.92 (H) 0.50 - 1.05 mg/dL    eGFR 26 (L) >60 mL/min/1.73m*2    Calcium 8.9 8.6 - 10.3 mg/dL   Magnesium   Result Value Ref Range    Magnesium 1.77 1.60 - 2.40 mg/dL   Phosphorus   Result Value Ref Range    Phosphorus 3.1 2.5 - 4.9 mg/dL   Morphology   Result Value Ref Range    RBC Morphology See Below     Polychromasia Mild     Hypochromia Mild     Ovalocytes Few     Teardrop Cells Few     Basophilic Stippling Present    POCT GLUCOSE   Result Value Ref Range    POCT Glucose 144 (H) 74 - 99 mg/dL   POCT GLUCOSE   Result Value Ref Range    POCT Glucose 148 (H) 74 - 99 mg/dL   POCT GLUCOSE   Result Value Ref Range    POCT Glucose 145 (H) 74 - 99 mg/dL   CBC and Auto Differential   Result Value Ref Range    WBC 6.6 4.4 - 11.3 x10*3/uL    nRBC 0.0 0.0 - 0.0 /100 WBCs    RBC 2.38 (L) 4.00 - 5.20 x10*6/uL    Hemoglobin 7.2 (L) 12.0 - 16.0 g/dL    Hematocrit 21.8 (L) 36.0 - 46.0 %    MCV 92 80 - 100 fL    MCH 30.3 26.0 - 34.0 pg    MCHC 33.0 32.0 - 36.0 g/dL    RDW 22.5 (H) 11.5 - 14.5 %    Platelets 211 150 - 450 x10*3/uL    Neutrophils % 77.0 40.0 - 80.0 %    Immature Granulocytes %, Automated 0.3 0.0 - 0.9 %    Lymphocytes % 12.0 13.0 - 44.0 %     Monocytes % 9.2 2.0 - 10.0 %    Eosinophils % 1.2 0.0 - 6.0 %    Basophils % 0.3 0.0 - 2.0 %    Neutrophils Absolute 5.09 1.60 - 5.50 x10*3/uL    Immature Granulocytes Absolute, Automated 0.02 0.00 - 0.50 x10*3/uL    Lymphocytes Absolute 0.79 (L) 0.80 - 3.00 x10*3/uL    Monocytes Absolute 0.61 0.05 - 0.80 x10*3/uL    Eosinophils Absolute 0.08 0.00 - 0.40 x10*3/uL    Basophils Absolute 0.02 0.00 - 0.10 x10*3/uL   Phosphorus   Result Value Ref Range    Phosphorus 1.9 (L) 2.5 - 4.9 mg/dL   Magnesium   Result Value Ref Range    Magnesium 1.68 1.60 - 2.40 mg/dL   Basic Metabolic Panel   Result Value Ref Range    Glucose 143 (H) 74 - 99 mg/dL    Sodium 137 136 - 145 mmol/L    Potassium 3.6 3.5 - 5.3 mmol/L    Chloride 102 98 - 107 mmol/L    Bicarbonate 30 21 - 32 mmol/L    Anion Gap 9 (L) 10 - 20 mmol/L    Urea Nitrogen 11 6 - 23 mg/dL    Creatinine 1.20 (H) 0.50 - 1.05 mg/dL    eGFR 46 (L) >60 mL/min/1.73m*2    Calcium 8.7 8.6 - 10.3 mg/dL   Troponin I, High Sensitivity   Result Value Ref Range    Troponin I, High Sensitivity 13 0 - 13 ng/L   Morphology   Result Value Ref Range    RBC Morphology See Below     Polychromasia Mild     Hypochromia Mild     Ovalocytes Few     Basophilic Stippling Present    POCT GLUCOSE   Result Value Ref Range    POCT Glucose 131 (H) 74 - 99 mg/dL     *Note: Due to a large number of results and/or encounters for the requested time period, some results have not been displayed. A complete set of results can be found in Results Review.     XR chest 1 view    Result Date: 6/8/2024  Interpreted By:  Josefa Reyna, STUDY: XR CHEST 1 VIEW; ;  6/8/2024 4:44 pm   INDICATION: Signs/Symptoms:Afib, POD2 s/p lap chidi.   COMPARISON: 05/19/2024   ACCESSION NUMBER(S): [id number]   ORDERING CLINICIAN: ANNIE HENRY   TECHNIQUE: Portable chest   FINDINGS: Tunneled right chest dual lumen catheter unchanged, distal tip projects in the right atrium. Right hemidiaphragm elevation is again noted. Patchy  left basal consolidation is increased. Increased perihilar interstitial opacities. Probable small pleural effusions. No pneumothorax. Unchanged cardiomediastinal silhouette. No acute osseous finding.       Probable interstitial edema/venous congestion and small pleural effusions. Increased left basal opacities may be atelectasis or pneumonia.     Signed by: Josefa Reyna 6/8/2024 6:15 PM Dictation workstation:   [id number]    ECG 12 Lead    Result Date: 6/7/2024  Sinus rhythm Borderline prolonged ME interval Borderline prolonged QT interval    ECG 12 lead    Result Date: 5/25/2024  Sinus rhythm Minimal ST depression, lateral leads Prolonged QT interval See ED provider note for full interpretation and clinical correlation Confirmed by Terri Bermudez ([id number]) on 5/25/2024 1:12:33 PM    US gallbladder    Result Date: 5/19/2024  STUDY: Right upper quadrant Ultrasound; 5/19/2024 4:28 PM INDICATION: Status post cholecystostomy, elevated LFTs. COMPARISON: CT A&P today, US gallbladder 4/18/2024, MR abdomen 4/18/2024. ACCESSION NUMBER(S): [id number] ORDERING CLINICIAN: CELIA WYNNE TECHNIQUE: Ultrasound of the right upper quadrant.  Multiple images of the right upper quadrant were obtained. FINDINGS: The liver demonstrates normal echogenicity.   The gallbladder contains sludge as well as multiple stones.  There is edematous wall thickening.  The cholecystectomy tube is visualized within the gallbladder.  Sonographic Luna's sign is negative.    The common bile duct measures 0.3 cm.  There is no intrahepatic biliary dilatation.   The pancreas is not well seen due to overlying bowel gas.  The right kidney measures 9.8 cm in length.  Renal cortical thinning is present and echotexture is increased.  There is no hydronephrosis. There are no stones.  There is a simple cyst measuring 1.2 x 1.0 x 0.8 cm within the midportion.    Sludge and stones within the gallbladder with edematous wall thickening.  No biliary dilatation is  appreciated. Increased echogenicity and thinning of the right renal cortex consistent with intrinsic renal disease.  No hydronephrosis. Signed by Lonnie Rodriguez MD    CT abdomen pelvis wo IV contrast    Result Date: 5/19/2024  STUDY: CT Abdomen and Pelvis without IV Contrast; 05/19/2024, 1:35 PM. INDICATION: Signs/Symptoms:Gallbladder surgery, elevated liver enzymes and lipase from baseline. COMPARISON: XR pelvis: 05/19/24, 04/23/24; IR Biliary: 04/27/24; MR abdomen: 04/14/24; US gallbladder: 04/18/24. ACCESSION NUMBER(S): [id number] ORDERING CLINICIAN: CELIA WYNNE TECHNIQUE: CT of the abdomen and pelvis was performed.  Contiguous axial images were obtained at 3 mm slice thickness through the abdomen and pelvis. Coronal and sagittal reconstructions at 3 mm slice thickness were performed. No intravenous contrast was administered.  Automated mA/kV exposure control was utilized and patient examination was performed in strict accordance with principles of ALARA. FINDINGS: Please note that the evaluation of vessels, lymph nodes and organs is limited without intravenous contrast.  LOWER CHEST: No cardiomegaly.  No pericardial effusion.  Atelectatic changes are noted.  Venous catheter terminates in the RIGHT atrium. Atherosclerosis of the thoracic aorta and coronary arteries is present.  ABDOMEN:  LIVER: No hepatomegaly.  Smooth surface contour.  Normal attenuation.  BILE DUCTS: No intrahepatic or extrahepatic biliary ductal dilatation.  GALLBLADDER: Percutaneously placed drainage tube noted within the maria isabel hepatis. This appears to be a colostomy tube with decompressed gallbladder versus tubing status post cholecystectomy with residual fluid within the gallbladder fossa.  Clinical correlation for operative history recommended. STOMACH: No abnormalities identified.  PANCREAS: No masses or ductal dilatation.  SPLEEN: No splenomegaly or focal splenic lesion.  ADRENAL GLANDS: No thickening or nodules.  KIDNEYS AND URETERS:  Kidneys are normal in size and location.  No renal or ureteral calculi.  PELVIS:  BLADDER: No abnormalities identified.  REPRODUCTIVE ORGANS: Patient is status post hysterectomy. Scattered phleboliths are present within the pelvis.  BOWEL: No abnormalities identified.  VESSELS: No abnormalities identified.  Atherosclerosis of the abdominal aorta and iliac arteries is noted.    PERITONEUM/RETROPERITONEUM/LYMPH NODES: No free fluid.  No pneumoperitoneum. No lymphadenopathy.  ABDOMINAL WALL: No abnormalities identified.  Midline incision scar noted. SOFT TISSUES: No abnormalities identified.   BONES: Patient is status post RIGHT femoral ORIF.  Patient is status post multilevel posterior fusion of L2-L5.  Intervertebral disc spacers at L5-S1 also noted.  Dextroconvex scoliosis of the lumbar spine noted. Moderate degenerative change of the hips demonstrated. Senescent changes are present within the lumbar spine.  No acute fracture or aggressive osseous lesion.    Percutaneous drainage tube noted within the maria isabel hepatis. This appears to be related to a decompressed gallbladder versus positioning status post cholecystectomy with residual fluid within the gallbladder fossa. Clinical correlation for operative history recommended. Signed by Abhi Godoy II, MD    XR pelvis 1-2 views    Result Date: 5/19/2024  STUDY: Pelvis Radiographs; 5/19/2024 10:54AM INDICATION: Fall. COMPARISON: 4/23/2024 XR Pelvis. ACCESSION NUMBER(S): [id number] ORDERING CLINICIAN: CELIA WYNNE TECHNIQUE:  One view(s) of the pelvis. FINDINGS:  The pelvic ring is intact.  There is no acute fracture.  Postsurgical changes of the proximal right femur without evidence of hardware malfunction.  Mild degenerative change of both hips.    No acute osseous abnormality. Signed by Lonnie Rodriguez MD    XR chest 1 view    Result Date: 5/19/2024  STUDY: Chest Radiograph;  5/19/2024 10:54AM INDICATION: Fall. COMPARISON: 4/23/2024 XR Chest. ACCESSION NUMBER(S):  [id number] ORDERING CLINICIAN: CELIA WYNNE TECHNIQUE:  Frontal chest was obtained at 11:53 hours. FINDINGS: CARDIOMEDIASTINAL SILHOUETTE: Cardiomediastinal silhouette is normal in size and configuration.  LUNGS: Lungs are clear.  Right-sided dialysis catheter with tip near the cavoatrial junction.  Elevation of the right hemidiaphragm.  ABDOMEN: No remarkable upper abdominal findings.  BONES: No acute osseous changes.    No acute process. Signed by Lonnie Rodriguez MD      Assessment/Plan   Principal Problem:    Chronic cholecystitis  Active Problems:    Chronic diastolic heart failure of unknown etiology (Multi)    Hypertension, essential    GERD (gastroesophageal reflux disease)    Hyperlipidemia    Myelodysplastic syndrome (Multi)    COPD without exacerbation (Multi)    ESRD (end stage renal disease) on dialysis (Multi)    Anemia due to chronic kidney disease, on chronic dialysis (Multi)    Atrial fibrillation with rapid ventricular response (Multi)    Atrial fibrillation with RVR  - Noted to be atrial fibrillation with RVR with heart rate of 110s.  - History of paroxysmal atrial fibrillation   - Likely multifactorial in setting of acute on chronic anemia, electrolyte dyscrasias, stress from surgery, and history of atrial fibrillation  - Patient denies any chest pain, shortness of breath, palpitations, lightheadedness, or dizziness.  - Continue Cardizem infusion, titrate to maintain heart rate between   - Continue home coreg  - Follow BMP, magnesium, and phosphorus  - Follow CBC  - 1 unit of PRBC per surgery's orders.  - No oral anticoagulation for atrial fibrillation given her chronic anemia requiring frequent blood transfusions  - Cardiology following, input appreciated.   - Replete electrolytes as needed.    Acute on chronic anemia in setting of ESRD, MDS, and recent surgery  - History of chronic anemia in setting of ESRD and MDS requiring MARILYNN and frequent PRBC transfusion  - Hemoglobin 6.6 this morning  before dialysis, plan per durgery team was to recheck level after HD and transfuse if needed, however developed A-fib RVR  - No evidence of bleeding noted  - Follow CBC  - 1 unit PRBC ordered by surgery  - Transfuse PRBC as needed to maintain hemoglobin of at least 7  - Monitor for signs of bleeding  -MARILYNN management per nephrology    Chronic cholecystitis s/p laparascopic fenstrataed subtotal cholecystectomy   - Denies nausea or vomiting  - Currently only on clear liquid diet due to no drive to eat much at this time  -Advance diet as patient tolerates  -Monitor drain output  -Monitor intake and output  -Scheduled tylenol for pain control  - Encourage incentive spirometry  - Pulmonary toilet  - Titrate oxygen to maintain SPO2 greater than 90%  - Encourage ambulation and up to chair during waking hours  -Management of antibiotics per general surgery's service    COPD without acute exacerbation  - History of COPD , no home O2 use  - Slightly diminished lung sounds to bilateral bases, no wheezing noted  -Currently on 2.5 liters of oxygen post-op.  -Titrate oxygen to maintain SPO2 greater than 90%  -Pulmonary toilet  -Encourage incentive spirometry use  -Encourage mobilization and up to chair during waking hours    Chronic HFpEF  -History of chronic HFpEF  -Denies any chest pain or shortness of breath  - Does not appear volume overloaded on exam  -Chest x-ray revealed probable intersitital edema/venous congestion and small pleural effusions, increased left basal opacities.  - Continue home meds  -If volume status worsens s/p transfusion, consider IV diureses  - Nephrology following and managing HD  -Cardiology following, input appreciated    ESRD on HD  - History of ESRD on HD (TTS schedule)  - Received HD today with 1 liter of removal  - Nephrology following, input appreciated  -Follow BMP    Hypomagnesemia  - Magnesium level 1.37 on 6/7 and was repleted  -Magnesium level 1.77>>1.68 today  -Magnesium repleted by IMS to  achieve level of around 2 given A-fib  -Follow magnesium level, replete as needed    Hypophosphatemia  - Phosphorus level 1.9  -Repleted with potassium phosphate  - Follow phosphorus level    I spent 40 minutes of cumulative critical care time with the patient.  Greater than 50% of that time was spent in the direct collaboration and or coordination of care of the patient.

## 2024-06-10 ENCOUNTER — APPOINTMENT (OUTPATIENT)
Dept: HEMATOLOGY/ONCOLOGY | Facility: CLINIC | Age: 79
End: 2024-06-10
Payer: MEDICARE

## 2024-06-10 LAB
ALBUMIN SERPL BCP-MCNC: 2.9 G/DL (ref 3.4–5)
ALP SERPL-CCNC: 81 U/L (ref 33–136)
ALT SERPL W P-5'-P-CCNC: 15 U/L (ref 7–45)
ANION GAP SERPL CALC-SCNC: 9 MMOL/L (ref 10–20)
AST SERPL W P-5'-P-CCNC: 12 U/L (ref 9–39)
ATRIAL RATE: 0 BPM
ATRIAL RATE: 67 BPM
BASOPHILS # BLD AUTO: 0.02 X10*3/UL (ref 0–0.1)
BASOPHILS NFR BLD AUTO: 0.4 %
BILIRUB DIRECT SERPL-MCNC: 0.1 MG/DL (ref 0–0.3)
BILIRUB SERPL-MCNC: 0.5 MG/DL (ref 0–1.2)
BLOOD EXPIRATION DATE: NORMAL
BUN SERPL-MCNC: 25 MG/DL (ref 6–23)
CALCIUM SERPL-MCNC: 8.6 MG/DL (ref 8.6–10.3)
CHLORIDE SERPL-SCNC: 102 MMOL/L (ref 98–107)
CO2 SERPL-SCNC: 28 MMOL/L (ref 21–32)
CREAT SERPL-MCNC: 2.02 MG/DL (ref 0.5–1.05)
DISPENSE STATUS: NORMAL
EGFRCR SERPLBLD CKD-EPI 2021: 25 ML/MIN/1.73M*2
EOSINOPHIL # BLD AUTO: 0.3 X10*3/UL (ref 0–0.4)
EOSINOPHIL NFR BLD AUTO: 6 %
ERYTHROCYTE [DISTWIDTH] IN BLOOD BY AUTOMATED COUNT: 21.2 % (ref 11.5–14.5)
GLUCOSE BLD MANUAL STRIP-MCNC: 124 MG/DL (ref 74–99)
GLUCOSE BLD MANUAL STRIP-MCNC: 220 MG/DL (ref 74–99)
GLUCOSE BLD MANUAL STRIP-MCNC: 90 MG/DL (ref 74–99)
GLUCOSE BLD MANUAL STRIP-MCNC: 93 MG/DL (ref 74–99)
GLUCOSE SERPL-MCNC: 115 MG/DL (ref 74–99)
HCT VFR BLD AUTO: 24.3 % (ref 36–46)
HGB BLD-MCNC: 8 G/DL (ref 12–16)
IMM GRANULOCYTES # BLD AUTO: 0.02 X10*3/UL (ref 0–0.5)
IMM GRANULOCYTES NFR BLD AUTO: 0.4 % (ref 0–0.9)
LYMPHOCYTES # BLD AUTO: 1.24 X10*3/UL (ref 0.8–3)
LYMPHOCYTES NFR BLD AUTO: 24.7 %
MAGNESIUM SERPL-MCNC: 2.04 MG/DL (ref 1.6–2.4)
MCH RBC QN AUTO: 29.7 PG (ref 26–34)
MCHC RBC AUTO-ENTMCNC: 32.9 G/DL (ref 32–36)
MCV RBC AUTO: 90 FL (ref 80–100)
MONOCYTES # BLD AUTO: 0.5 X10*3/UL (ref 0.05–0.8)
MONOCYTES NFR BLD AUTO: 9.9 %
NEUTROPHILS # BLD AUTO: 2.95 X10*3/UL (ref 1.6–5.5)
NEUTROPHILS NFR BLD AUTO: 58.6 %
NRBC BLD-RTO: 0 /100 WBCS (ref 0–0)
P AXIS: 58 DEGREES
PHOSPHATE SERPL-MCNC: 2.8 MG/DL (ref 2.5–4.9)
PLATELET # BLD AUTO: 241 X10*3/UL (ref 150–450)
POTASSIUM SERPL-SCNC: 3.7 MMOL/L (ref 3.5–5.3)
PR INTERVAL: 169 MS
PR INTERVAL: 214 MS
PRODUCT BLOOD TYPE: 600
PRODUCT CODE: NORMAL
PROT SERPL-MCNC: 5.2 G/DL (ref 6.4–8.2)
Q ONSET: 249 MS
Q ONSET: 251 MS
QRS COUNT: 11 BEATS
QRS COUNT: 16 BEATS
QRS DURATION: 84 MS
QRS DURATION: 96 MS
QT INTERVAL: 350 MS
QT INTERVAL: 468 MS
QTC CALCULATION(BAZETT): 458 MS
QTC CALCULATION(BAZETT): 494 MS
QTC FREDERICIA: 419 MS
QTC FREDERICIA: 485 MS
R AXIS: 14 DEGREES
R AXIS: 19 DEGREES
RBC # BLD AUTO: 2.69 X10*6/UL (ref 4–5.2)
RBC MORPH BLD: NORMAL
SODIUM SERPL-SCNC: 135 MMOL/L (ref 136–145)
T AXIS: 59 DEGREES
T AXIS: 60 DEGREES
T OFFSET: 424 MS
T OFFSET: 485 MS
UFH PPP CHRO-ACNC: 0.5 IU/ML
UNIT ABO: NORMAL
UNIT NUMBER: NORMAL
UNIT RH: NORMAL
UNIT VOLUME: 350
VENTRICULAR RATE: 103 BPM
VENTRICULAR RATE: 67 BPM
WBC # BLD AUTO: 5 X10*3/UL (ref 4.4–11.3)
XM INTEP: NORMAL

## 2024-06-10 PROCEDURE — 2500000005 HC RX 250 GENERAL PHARMACY W/O HCPCS: Performed by: SURGERY

## 2024-06-10 PROCEDURE — 83735 ASSAY OF MAGNESIUM: CPT | Performed by: SURGERY

## 2024-06-10 PROCEDURE — 97112 NEUROMUSCULAR REEDUCATION: CPT | Mod: GP,CQ | Performed by: PHYSICAL THERAPY ASSISTANT

## 2024-06-10 PROCEDURE — 99232 SBSQ HOSP IP/OBS MODERATE 35: CPT | Performed by: HOSPITALIST

## 2024-06-10 PROCEDURE — 36415 COLL VENOUS BLD VENIPUNCTURE: CPT | Performed by: SURGERY

## 2024-06-10 PROCEDURE — 85025 COMPLETE CBC W/AUTO DIFF WBC: CPT | Performed by: SURGERY

## 2024-06-10 PROCEDURE — 2500000001 HC RX 250 WO HCPCS SELF ADMINISTERED DRUGS (ALT 637 FOR MEDICARE OP): Performed by: SURGERY

## 2024-06-10 PROCEDURE — 80048 BASIC METABOLIC PNL TOTAL CA: CPT | Performed by: SURGERY

## 2024-06-10 PROCEDURE — 99221 1ST HOSP IP/OBS SF/LOW 40: CPT | Performed by: INTERNAL MEDICINE

## 2024-06-10 PROCEDURE — 99024 POSTOP FOLLOW-UP VISIT: CPT | Performed by: SURGERY

## 2024-06-10 PROCEDURE — 1200000002 HC GENERAL ROOM WITH TELEMETRY DAILY

## 2024-06-10 PROCEDURE — 82947 ASSAY GLUCOSE BLOOD QUANT: CPT

## 2024-06-10 PROCEDURE — 2500000001 HC RX 250 WO HCPCS SELF ADMINISTERED DRUGS (ALT 637 FOR MEDICARE OP): Performed by: PHARMACIST

## 2024-06-10 PROCEDURE — 82248 BILIRUBIN DIRECT: CPT | Performed by: SURGERY

## 2024-06-10 PROCEDURE — 2500000001 HC RX 250 WO HCPCS SELF ADMINISTERED DRUGS (ALT 637 FOR MEDICARE OP): Performed by: INTERNAL MEDICINE

## 2024-06-10 PROCEDURE — 84100 ASSAY OF PHOSPHORUS: CPT | Performed by: SURGERY

## 2024-06-10 PROCEDURE — 85520 HEPARIN ASSAY: CPT | Performed by: SURGERY

## 2024-06-10 PROCEDURE — 86902 BLOOD TYPE ANTIGEN DONOR EA: CPT

## 2024-06-10 PROCEDURE — 97530 THERAPEUTIC ACTIVITIES: CPT | Mod: GP,CQ | Performed by: PHYSICAL THERAPY ASSISTANT

## 2024-06-10 PROCEDURE — 2500000004 HC RX 250 GENERAL PHARMACY W/ HCPCS (ALT 636 FOR OP/ED): Performed by: SURGERY

## 2024-06-10 PROCEDURE — 97530 THERAPEUTIC ACTIVITIES: CPT | Mod: GO,CO

## 2024-06-10 RX ADMIN — DOCUSATE SODIUM 100 MG: 100 CAPSULE, LIQUID FILLED ORAL at 21:07

## 2024-06-10 RX ADMIN — APIXABAN 2.5 MG: 2.5 TABLET, FILM COATED ORAL at 12:09

## 2024-06-10 RX ADMIN — CIPROFLOXACIN 400 MG: 400 INJECTION, SOLUTION INTRAVENOUS at 08:46

## 2024-06-10 RX ADMIN — INSULIN LISPRO 4 UNITS: 100 INJECTION, SOLUTION INTRAVENOUS; SUBCUTANEOUS at 11:26

## 2024-06-10 RX ADMIN — ACETAMINOPHEN 975 MG: 325 TABLET ORAL at 22:25

## 2024-06-10 RX ADMIN — Medication 2 L/MIN: at 19:57

## 2024-06-10 RX ADMIN — Medication 1 TABLET: at 08:46

## 2024-06-10 RX ADMIN — CYANOCOBALAMIN TAB 1000 MCG 1000 MCG: 1000 TAB at 08:46

## 2024-06-10 RX ADMIN — CARVEDILOL 25 MG: 25 TABLET, FILM COATED ORAL at 08:46

## 2024-06-10 RX ADMIN — Medication 1 L/MIN: at 20:00

## 2024-06-10 RX ADMIN — PRAVASTATIN SODIUM 40 MG: 40 TABLET ORAL at 21:07

## 2024-06-10 RX ADMIN — METRONIDAZOLE 500 MG: 500 INJECTION, SOLUTION INTRAVENOUS at 10:33

## 2024-06-10 RX ADMIN — METRONIDAZOLE 500 MG: 500 INJECTION, SOLUTION INTRAVENOUS at 17:28

## 2024-06-10 RX ADMIN — APIXABAN 2.5 MG: 2.5 TABLET, FILM COATED ORAL at 22:25

## 2024-06-10 RX ADMIN — Medication 50 MG OF ELEMENTAL ZINC: at 08:46

## 2024-06-10 RX ADMIN — POLYETHYLENE GLYCOL 3350 17 G: 17 POWDER, FOR SOLUTION ORAL at 08:46

## 2024-06-10 RX ADMIN — FOLIC ACID 1 MG: 1 TABLET ORAL at 08:46

## 2024-06-10 RX ADMIN — DOCUSATE SODIUM 100 MG: 100 CAPSULE, LIQUID FILLED ORAL at 08:46

## 2024-06-10 RX ADMIN — Medication 2000 UNITS: at 08:46

## 2024-06-10 RX ADMIN — OXYCODONE HYDROCHLORIDE AND ACETAMINOPHEN 500 MG: 500 TABLET ORAL at 08:46

## 2024-06-10 RX ADMIN — ACETAMINOPHEN 975 MG: 325 TABLET ORAL at 16:17

## 2024-06-10 RX ADMIN — ACETAMINOPHEN 975 MG: 325 TABLET ORAL at 06:31

## 2024-06-10 RX ADMIN — LOSARTAN POTASSIUM 50 MG: 50 TABLET, FILM COATED ORAL at 08:46

## 2024-06-10 RX ADMIN — HEPARIN SODIUM 1300 UNITS/HR: 10000 INJECTION, SOLUTION INTRAVENOUS at 00:45

## 2024-06-10 RX ADMIN — Medication 1 L/MIN: at 08:00

## 2024-06-10 RX ADMIN — METRONIDAZOLE 500 MG: 500 INJECTION, SOLUTION INTRAVENOUS at 01:42

## 2024-06-10 RX ADMIN — PREGABALIN 100 MG: 50 CAPSULE ORAL at 08:46

## 2024-06-10 RX ADMIN — CARVEDILOL 25 MG: 25 TABLET, FILM COATED ORAL at 21:07

## 2024-06-10 RX ADMIN — PREGABALIN 100 MG: 50 CAPSULE ORAL at 21:07

## 2024-06-10 ASSESSMENT — COGNITIVE AND FUNCTIONAL STATUS - GENERAL
MOBILITY SCORE: 7
HELP NEEDED FOR BATHING: A LOT
DAILY ACTIVITIY SCORE: 7
MOVING FROM LYING ON BACK TO SITTING ON SIDE OF FLAT BED WITH BEDRAILS: A LITTLE
HELP NEEDED FOR BATHING: A LOT
DRESSING REGULAR LOWER BODY CLOTHING: A LOT
TOILETING: TOTAL
DAILY ACTIVITIY SCORE: 7
PERSONAL GROOMING: A LOT
DRESSING REGULAR UPPER BODY CLOTHING: TOTAL
MOVING TO AND FROM BED TO CHAIR: A LOT
EATING MEALS: A LOT
MOVING FROM LYING ON BACK TO SITTING ON SIDE OF FLAT BED WITH BEDRAILS: A LITTLE
TURNING FROM BACK TO SIDE WHILE IN FLAT BAD: TOTAL
PERSONAL GROOMING: A LOT
DRESSING REGULAR UPPER BODY CLOTHING: A LOT
CLIMB 3 TO 5 STEPS WITH RAILING: TOTAL
CLIMB 3 TO 5 STEPS WITH RAILING: TOTAL
MOVING TO AND FROM BED TO CHAIR: TOTAL
CLIMB 3 TO 5 STEPS WITH RAILING: TOTAL
WALKING IN HOSPITAL ROOM: A LOT
PERSONAL GROOMING: A LOT
DAILY ACTIVITIY SCORE: 7
CLIMB 3 TO 5 STEPS WITH RAILING: TOTAL
DRESSING REGULAR UPPER BODY CLOTHING: TOTAL
STANDING UP FROM CHAIR USING ARMS: A LOT
STANDING UP FROM CHAIR USING ARMS: TOTAL
DRESSING REGULAR UPPER BODY CLOTHING: TOTAL
WALKING IN HOSPITAL ROOM: TOTAL
TURNING FROM BACK TO SIDE WHILE IN FLAT BAD: A LOT
HELP NEEDED FOR BATHING: TOTAL
MOVING TO AND FROM BED TO CHAIR: TOTAL
EATING MEALS: A LITTLE
MOBILITY SCORE: 12
EATING MEALS: A LITTLE
DRESSING REGULAR UPPER BODY CLOTHING: TOTAL
EATING MEALS: A LOT
HELP NEEDED FOR BATHING: TOTAL
TOILETING: A LOT
STANDING UP FROM CHAIR USING ARMS: TOTAL
PERSONAL GROOMING: TOTAL
MOVING TO AND FROM BED TO CHAIR: A LOT
MOVING FROM LYING ON BACK TO SITTING ON SIDE OF FLAT BED WITH BEDRAILS: A LOT
DRESSING REGULAR LOWER BODY CLOTHING: TOTAL
DRESSING REGULAR LOWER BODY CLOTHING: TOTAL
MOVING FROM LYING ON BACK TO SITTING ON SIDE OF FLAT BED WITH BEDRAILS: A LOT
MOVING TO AND FROM BED TO CHAIR: A LOT
DRESSING REGULAR UPPER BODY CLOTHING: A LOT
MOBILITY SCORE: 12
DRESSING REGULAR LOWER BODY CLOTHING: TOTAL
DAILY ACTIVITIY SCORE: 13
HELP NEEDED FOR BATHING: TOTAL
HELP NEEDED FOR BATHING: TOTAL
MOVING FROM LYING ON BACK TO SITTING ON SIDE OF FLAT BED WITH BEDRAILS: A LOT
DRESSING REGULAR LOWER BODY CLOTHING: A LOT
WALKING IN HOSPITAL ROOM: TOTAL
DAILY ACTIVITIY SCORE: 13
CLIMB 3 TO 5 STEPS WITH RAILING: TOTAL
DRESSING REGULAR UPPER BODY CLOTHING: A LOT
MOBILITY SCORE: 12
TOILETING: TOTAL
DRESSING REGULAR UPPER BODY CLOTHING: A LOT
CLIMB 3 TO 5 STEPS WITH RAILING: TOTAL
WALKING IN HOSPITAL ROOM: A LOT
MOVING TO AND FROM BED TO CHAIR: TOTAL
TURNING FROM BACK TO SIDE WHILE IN FLAT BAD: TOTAL
TOILETING: A LOT
TURNING FROM BACK TO SIDE WHILE IN FLAT BAD: A LOT
HELP NEEDED FOR BATHING: A LOT
PERSONAL GROOMING: TOTAL
MOBILITY SCORE: 12
DAILY ACTIVITIY SCORE: 13
MOBILITY SCORE: 7
WALKING IN HOSPITAL ROOM: TOTAL
DRESSING REGULAR LOWER BODY CLOTHING: A LOT
TURNING FROM BACK TO SIDE WHILE IN FLAT BAD: A LOT
CLIMB 3 TO 5 STEPS WITH RAILING: TOTAL
MOVING TO AND FROM BED TO CHAIR: TOTAL
PERSONAL GROOMING: TOTAL
CLIMB 3 TO 5 STEPS WITH RAILING: TOTAL
DRESSING REGULAR LOWER BODY CLOTHING: TOTAL
MOVING FROM LYING ON BACK TO SITTING ON SIDE OF FLAT BED WITH BEDRAILS: A LITTLE
STANDING UP FROM CHAIR USING ARMS: A LOT
TURNING FROM BACK TO SIDE WHILE IN FLAT BAD: A LOT
STANDING UP FROM CHAIR USING ARMS: TOTAL
MOVING FROM LYING ON BACK TO SITTING ON SIDE OF FLAT BED WITH BEDRAILS: A LITTLE
HELP NEEDED FOR BATHING: A LOT
DRESSING REGULAR LOWER BODY CLOTHING: A LOT
EATING MEALS: A LOT
EATING MEALS: A LITTLE
DAILY ACTIVITIY SCORE: 13
MOBILITY SCORE: 7
WALKING IN HOSPITAL ROOM: A LOT
PERSONAL GROOMING: A LOT
TURNING FROM BACK TO SIDE WHILE IN FLAT BAD: TOTAL
EATING MEALS: A LOT
EATING MEALS: A LITTLE
TOILETING: TOTAL
WALKING IN HOSPITAL ROOM: TOTAL
TOILETING: A LOT
MOVING TO AND FROM BED TO CHAIR: A LOT
TURNING FROM BACK TO SIDE WHILE IN FLAT BAD: TOTAL
MOVING FROM LYING ON BACK TO SITTING ON SIDE OF FLAT BED WITH BEDRAILS: A LOT
TOILETING: A LOT
STANDING UP FROM CHAIR USING ARMS: TOTAL
MOBILITY SCORE: 7
STANDING UP FROM CHAIR USING ARMS: A LOT
PERSONAL GROOMING: TOTAL
DAILY ACTIVITIY SCORE: 7
STANDING UP FROM CHAIR USING ARMS: A LOT
WALKING IN HOSPITAL ROOM: A LOT
TOILETING: TOTAL

## 2024-06-10 ASSESSMENT — PAIN SCALES - GENERAL
PAINLEVEL_OUTOF10: 0 - NO PAIN

## 2024-06-10 ASSESSMENT — PAIN - FUNCTIONAL ASSESSMENT
PAIN_FUNCTIONAL_ASSESSMENT: 0-10

## 2024-06-10 NOTE — PROGRESS NOTES
Calorie Count/Follow up    Medical history per chart:   79 y.o. female with PMHx s/f CAD, HTN, COPD, ESRD on HD via permacath, DM2, MDS, gout, neuropathy presenting for a fall.   Per surgery patient presenting with chronic cholecystitis managed with a percutaneous cholecystostomy tube here for definitive removal of her GB and drain.     Date/Time Started:  6/9/24    Date/Time Completed: 6/9/24    Calorie Count  Total Calories (kcals): 987 kcals  Total Protein (g): 25 g  Calorie Count Results: Patient with increasing PO intake after surgery, and supplements to be added.  Will continue to monitor PO with calorie count today.  Calorie Count Interpretation: Recommend continuing calorie count    6/10:   Patient sitting up in chair, and son at bedside.  Pt with improving PO intake and appetite.  Diet was advanced on 6/8, but PO was started on 6/9.  Pt was able to eat Chinese toast this morning, and agreeable to supplements with meals.  Pt continues on HD, and declined need for any further diet education.     6/7:  Pt is s/p partial fenestrated cholecystectomy, and was started on Clear liquids today.  Pt seen in ICU with some confusion, and s/p partial fenestrated cholecystectomy.  Pt tolerating sips of clears.  Pt agreeable to supplements as ordered.      Will send Berry Ensure Clear daily, and Vanilla Ensure plus high protein BID to supplement diet.  Will continue to follow PO with calorie count today, and follow per goals of care.

## 2024-06-10 NOTE — PROGRESS NOTES
Physical Therapy    Physical Therapy Treatment    Patient Name: Tana Hargrove  MRN: [id number]  Today's Date: 6/10/2024  Time Calculation  Start Time: 1430  Stop Time: 1458  Time Calculation (min): 28 min    Assessment/Plan   PT Assessment  End of Session Communication: Bedside nurse  Assessment Comment: pt fatigues easily but is motivated. She would benefit from further treatment to achieve all goals set by PT.  End of Session Patient Position: Up in chair, Alarm on     PT Plan  Treatment/Interventions: Bed mobility, Transfer training, Balance training, Strengthening, Endurance training (PROGRESS TO OOB ASSESSMENT WHEN APPROPRIATE)  PT Plan: Skilled PT  PT Frequency: 4 times per week  PT Discharge Recommendations: Moderate intensity level of continued care  PT Recommended Transfer Status: Assist x2 (BED LEVEL)  PT - OK to Discharge: Yes (WHEN MEDICALLY CLEARED)      General Visit Information:   PT  Visit  PT Received On: 06/10/24  Response to Previous Treatment: Patient with no complaints from previous session.  General  Reason for Referral: impaired mobility, gait training  Referred By: ELAINE  Past Medical History Relevant to Rehab: CHRONIC CHOLECYSTITIS; DX: LAP CHORE/KAREN ON 6/6; HX: CAD, HTN, COPD, ESRD ON HS, DM, MDS GOUT, NEUROPATHY, FALLS  Patient Position Received: Bed, 3 rail up, Alarm on  Preferred Learning Style: verbal  General Comment: pt agreeable to PT and cleared by RN. pt reported she is fatigued today.      Precautions:  Precautions  Medical Precautions: Fall precautions, Oxygen therapy device and L/min         Pain:  Pain Assessment  Pain Assessment: 0-10  Pain Score: 0 - No pain  Cognition:  Cognition  Overall Cognitive Status: Within Functional Limits       Treatments:  Balance/Neuromuscular Re-Education  Balance/Neuromuscular Re-Education Activity Performed: Yes  Balance/Neuromuscular Re-Education Activity 1: pt sat EOB with MOD Ax1 with cues to correct retro lean and LOB. pt stood x 4  attempts with MOD Ax2 to maintain at wheeled walker. pt was able to maintain 1>2 minutes.    Bed Mobility  Bed Mobility: Yes  Bed Mobility 1  Bed Mobility 1: Supine to sitting  Level of Assistance 1: Moderate assistance, +2, Moderate verbal cues  Bed Mobility Comments 1: pt educated on use of bed rails and improved technique.    Ambulation/Gait Training  Ambulation/Gait Training Performed: Yes  Ambulation/Gait Training 1  Surface 1: Level tile  Device 1: Rolling walker  Assistance 1: Moderate assistance, Moderate verbal cues (Ax2)  Quality of Gait 1: Diminished heel strike, Decreased step length, Forward flexed posture  Comments/Distance (ft) 1: 4 steps with cues for posture and walker safety.  Transfers  Transfer: Yes  Transfer 1  Technique 1: Sit to stand, Stand to sit  Transfer Device 1: Walker  Transfer Level of Assistance 1: Moderate assistance, +2, Moderate verbal cues  Trials/Comments 1: cues for proper hand placement and safety.  Transfers 2  Technique 2: Stand pivot  Transfer Device 2: Walker  Transfer Level of Assistance 2: Moderate assistance, +2, Moderate verbal cues  Trials/Comments 2: cues for walker safety and technique.    Outcome Measures:  [facility] Basic Mobility  Turning from your back to your side while in a flat bed without using bedrails: A little  Moving from lying on your back to sitting on the side of a flat bed without using bedrails: A lot  Moving to and from bed to chair (including a wheelchair): A lot  Standing up from a chair using your arms (e.g. wheelchair or bedside chair): A lot  To walk in hospital room: A lot  Climbing 3-5 steps with railing: Total  Basic Mobility - Total Score: 12    Education Documentation  Mobility Training, taught by Radha Etienne PTA at 6/10/2024  3:45 PM.  Learner: Patient  Readiness: Acceptance  Method: Explanation  Response: Verbalizes Understanding, Needs Reinforcement    Education Comments  No comments found.             Encounter Problems       Encounter  Problems (Active)       Balance       Goal 1 (Progressing)       Start:  06/08/24    Expected End:  06/22/24       SIT EOB CGA FOR 12 MIN WHILE COMPLETING EX AND FUNCTIONAL ACTIVITIES TO  IMPROVE BALANCE, STRENGTH AND ENDURANCE FOR PROGRESS ION TO OOB ACTIVITIES            Mobility       Goal 1 (Progressing)       Start:  06/08/24    Expected End:  06/29/24       20 REPS AROM/RROM EX IMPROVING STRENGTH TO  PROGRESS OOB ACTIVITIES            PT Transfers       STG - Patient to transfer to and from sit to supine (Progressing)       Start:  06/08/24    Expected End:  06/15/24       MOD X1-2 USING RAILS         STG - Patient will transfer sit to and from stand (Progressing)       Start:  06/08/24    Expected End:  06/22/24       FWW MOD X2 A USING PROPER TECHNIQUE            Pain - Adult

## 2024-06-10 NOTE — PROGRESS NOTES
Occupational Therapy    OT Treatment    Patient Name: Tana Hargrove  MRN: [id number]  Today's Date: 6/10/2024  Time Calculation  Start Time: 1431  Stop Time: 1458  Time Calculation (min): 27 min         Assessment:  End of Session Communication: Bedside nurse  End of Session Patient Position: Up in chair, Alarm on     Plan:  Treatment Interventions: ADL retraining, Functional transfer training, UE strengthening/ROM, Endurance training  OT Frequency: 4 times per week  OT Discharge Recommendations: Moderate intensity level of continued care  OT Recommended Transfer Status: Assist of 2  OT - OK to Discharge: Yes ((when medically approp.))  Treatment Interventions: ADL retraining, Functional transfer training, UE strengthening/ROM, Endurance training    Subjective   Previous Visit Info:  OT Last Visit  OT Received On: 06/10/24  General:  General  Prior to Session Communication: Bedside nurse  Patient Position Received: Bed, 3 rail up, Alarm on  General Comment: Pt agreeable to therapy with encouragement.  Precautions:  Medical Precautions: Fall precautions, Oxygen therapy device and L/min  Vital Signs:     Pain:  Pain Assessment  Pain Assessment: 0-10  Pain Score: 0 - No pain    Objective    Cognition:  Cognition  Overall Cognitive Status: Within Functional Limits            Functional Standing Tolerance:  Activity: Pt completed x4 stands tolerating for 1-2 minutes with mod A x2 using FWW fo  Bed Mobility/Transfers: Bed Mobility 1  Bed Mobility 1: Supine to sitting  Level of Assistance 1: Moderate assistance, +2, Moderate verbal cues    Transfer 1  Technique 1: Sit to stand, Stand to sit  Transfer Device 1: Walker  Transfer Level of Assistance 1: Moderate assistance, +2, Moderate verbal cues  Transfers 2  Transfer From 2: Bed to  Transfer to 2: Chair with arms  Technique 2: Stand pivot  Transfer Device 2: Walker  Transfer Level of Assistance 2: Moderate assistance, +2, Moderate verbal cues                  Therapy/Activity:      Therapeutic Activity  Therapeutic Activity Performed: Yes  Therapeutic Activity 1: Pt tolerated sitting EOB 4 minutes with CGA.    Outcome Measures:[facility] Daily Activity  Putting on and taking off regular lower body clothing: A lot  Bathing (including washing, rinsing, drying): A lot  Putting on and taking off regular upper body clothing: A lot  Toileting, which includes using toilet, bedpan or urinal: A lot  Taking care of personal grooming such as brushing teeth: A lot  Eating Meals: A little  Daily Activity - Total Score: 13        Education Documentation  Body Mechanics, taught by PEDRO Massey at 6/10/2024  4:02 PM.  Learner: Patient  Readiness: Acceptance  Method: Explanation  Response: Needs Reinforcement    Education Comments  No comments found.            Goals:  Encounter Problems       Encounter Problems (Active)       ADLs       Demo. grooming, brushing teeth, washing face while seated EOB with SBA. (Progressing)       Start:  06/08/24    Expected End:  06/22/24               BALANCE       Kyler. 1 min. static stand with FWW with Min. A.  (Progressing)       Start:  06/08/24    Expected End:  06/22/24               EXERCISE/STRENGTHENING       Kyler. UE ex. all planes min. resist. 10 reps.with min. fatigue.  (Progressing)       Start:  06/08/24    Expected End:  06/22/24

## 2024-06-10 NOTE — PROGRESS NOTES
Tana Hargrove is a 79 y.o. female on day 4 of admission presenting with Chronic cholecystitis.    Subjective   Tolerating PO.  No flatus nor BM as yet.         Objective   Heart Rate:  [63-73]   Temp:  [36.6 °C (97.9 °F)-37.9 °C (100.2 °F)]   Resp:  [11-21]   BP: (119-143)/(51-75)   Weight:  [66.4 kg (146 lb 6.2 oz)]   SpO2:  [90 %-97 %]   I/O last 3 completed shifts:  In: 1882.1 (28.3 mL/kg) [P.O.:490; I.V.:342.1 (5.2 mL/kg); Blood:350; IV Piggyback:700]  Out: 870 (13.1 mL/kg) [Urine:870 (0.4 mL/kg/hr)]  Weight: 66.4 kg   I/O this shift:  In: 1.1 [I.V.:1.1]  Out: -   Physical Exam  Vitals reviewed.   Cardiovascular:      Rate and Rhythm: Normal rate.   Pulmonary:      Effort: Pulmonary effort is normal.   Abdominal:      Palpations: Abdomen is soft.      Comments: Incisions c/d/I  R JANE pulled and pressure dressing placed   Skin:     General: Skin is warm.      Capillary Refill: Capillary refill takes less than 2 seconds.   Neurological:      General: No focal deficit present.      Mental Status: She is alert.   Psychiatric:         Mood and Affect: Mood normal.       Results for orders placed or performed during the hospital encounter of 06/06/24 (from the past 24 hour(s))   POCT GLUCOSE   Result Value Ref Range    POCT Glucose 179 (H) 74 - 99 mg/dL   Heparin Assay, UFH   Result Value Ref Range    Heparin Unfractionated 1.1 (HH) See Comment Below for Therapeutic Ranges IU/mL   POCT GLUCOSE   Result Value Ref Range    POCT Glucose 114 (H) 74 - 99 mg/dL   Heparin Assay, UFH   Result Value Ref Range    Heparin Unfractionated 0.6 See Comment Below for Therapeutic Ranges IU/mL   POCT GLUCOSE   Result Value Ref Range    POCT Glucose 156 (H) 74 - 99 mg/dL   Heparin Assay, UFH   Result Value Ref Range    Heparin Unfractionated 0.5 See Comment Below for Therapeutic Ranges IU/mL   CBC and Auto Differential   Result Value Ref Range    WBC 5.0 4.4 - 11.3 x10*3/uL    nRBC 0.0 0.0 - 0.0 /100 WBCs    RBC 2.69 (L) 4.00 - 5.20  x10*6/uL    Hemoglobin 8.0 (L) 12.0 - 16.0 g/dL    Hematocrit 24.3 (L) 36.0 - 46.0 %    MCV 90 80 - 100 fL    MCH 29.7 26.0 - 34.0 pg    MCHC 32.9 32.0 - 36.0 g/dL    RDW 21.2 (H) 11.5 - 14.5 %    Platelets 241 150 - 450 x10*3/uL    Neutrophils % 58.6 40.0 - 80.0 %    Immature Granulocytes %, Automated 0.4 0.0 - 0.9 %    Lymphocytes % 24.7 13.0 - 44.0 %    Monocytes % 9.9 2.0 - 10.0 %    Eosinophils % 6.0 0.0 - 6.0 %    Basophils % 0.4 0.0 - 2.0 %    Neutrophils Absolute 2.95 1.60 - 5.50 x10*3/uL    Immature Granulocytes Absolute, Automated 0.02 0.00 - 0.50 x10*3/uL    Lymphocytes Absolute 1.24 0.80 - 3.00 x10*3/uL    Monocytes Absolute 0.50 0.05 - 0.80 x10*3/uL    Eosinophils Absolute 0.30 0.00 - 0.40 x10*3/uL    Basophils Absolute 0.02 0.00 - 0.10 x10*3/uL   Basic Metabolic Panel   Result Value Ref Range    Glucose 115 (H) 74 - 99 mg/dL    Sodium 135 (L) 136 - 145 mmol/L    Potassium 3.7 3.5 - 5.3 mmol/L    Chloride 102 98 - 107 mmol/L    Bicarbonate 28 21 - 32 mmol/L    Anion Gap 9 (L) 10 - 20 mmol/L    Urea Nitrogen 25 (H) 6 - 23 mg/dL    Creatinine 2.02 (H) 0.50 - 1.05 mg/dL    eGFR 25 (L) >60 mL/min/1.73m*2    Calcium 8.6 8.6 - 10.3 mg/dL   Magnesium   Result Value Ref Range    Magnesium 2.04 1.60 - 2.40 mg/dL   Phosphorus   Result Value Ref Range    Phosphorus 2.8 2.5 - 4.9 mg/dL   Hepatic Function Panel   Result Value Ref Range    Albumin 2.9 (L) 3.4 - 5.0 g/dL    Bilirubin, Total 0.5 0.0 - 1.2 mg/dL    Bilirubin, Direct 0.1 0.0 - 0.3 mg/dL    Alkaline Phosphatase 81 33 - 136 U/L    ALT 15 7 - 45 U/L    AST 12 9 - 39 U/L    Total Protein 5.2 (L) 6.4 - 8.2 g/dL   Heparin Assay, UFH   Result Value Ref Range    Heparin Unfractionated 0.5 See Comment Below for Therapeutic Ranges IU/mL   Morphology   Result Value Ref Range    RBC Morphology See Below    POCT GLUCOSE   Result Value Ref Range    POCT Glucose 124 (H) 74 - 99 mg/dL     *Note: Due to a large number of results and/or encounters for the requested time  period, some results have not been displayed. A complete set of results can be found in Results Review.         Assessment/Plan   Principal Problem:    Chronic cholecystitis  Active Problems:    Chronic diastolic heart failure of unknown etiology (Multi)    Hypertension, essential    GERD (gastroesophageal reflux disease)    Hyperlipidemia    Myelodysplastic syndrome (Multi)    COPD without exacerbation (Multi)    ESRD (end stage renal disease) on dialysis (Multi)    Anemia due to chronic kidney disease, on chronic dialysis (Multi)    Atrial fibrillation with rapid ventricular response (Multi)  79F POD4 s/p lap subtotal fenestrated cholecystectomy with pAFib doing expectedly  - R JANE pulled at bedside  - dc hep gtt and challenge with PO eliquis 5 bid  - appreciate Cards and Haem input  - cont to watch for bleeding issues whilst on eliquis; if no bleeding issues whilst in unit, can dc to 3E tomorrow  - aggressive PT/OT  - AROBF    I spent 45 minutes in the professional and overall care of this patient.      Ronald Grimes MD

## 2024-06-10 NOTE — PROGRESS NOTES
"      Nephrology Progress Note      Nephrology following for ESRD .   Events over night:   Had HD yest   Developed afib last night and moved to ICU   C/o right side abd pain   Rate controled now   Feeling better       /63   Pulse 73   Temp 37.9 °C (100.2 °F)   Resp 20   Ht 1.549 m (5' 0.98\")   Wt 81.9 kg (180 lb 8.9 oz)   SpO2 92%   BMI 34.13 kg/m²     Input / Output:  24 HR:   Intake/Output Summary (Last 24 hours) at 6/9/2024 2025  Last data filed at 6/9/2024 1913  Gross per 24 hour   Intake 1508.33 ml   Output 340 ml   Net 1168.33 ml       Physical Exam   Alert and oriented x 3 NAD  Neck: no JVD  CV: RRR  Lungs: CTA bilaterally  Abd: soft, NT, ND   Ext: no  lower extremity edema    Scheduled medications  acetaminophen, 975 mg, oral, q8h  ascorbic acid, 500 mg, oral, Daily  carvedilol, 25 mg, oral, BID  cholecalciferol, 2,000 Units, oral, Daily  ciprofloxacin, 400 mg, intravenous, Daily  cyanocobalamin, 1,000 mcg, oral, Daily  docusate sodium, 100 mg, oral, BID  folic acid, 1 mg, oral, Daily  heparin, 2,000 Units, intra-catheter, After Dialysis  heparin, 2,000 Units, intra-catheter, After Dialysis  insulin lispro, 0-10 Units, subcutaneous, TID  losartan, 50 mg, oral, Daily  metroNIDAZOLE, 500 mg, intravenous, q8h  multivitamin with minerals, 1 tablet, oral, Daily  oxygen, , inhalation, Continuous - Inhalation  polyethylene glycol, 17 g, oral, Daily  pravastatin, 40 mg, oral, Nightly  pregabalin, 100 mg, oral, BID  zinc sulfate, 50 mg of elemental zinc, oral, Daily      Continuous medications  heparin, 0-4,500 Units/hr, Last Rate: 1,300 Units/hr (06/09/24 1500)      PRN medications  PRN medications: bisacodyl, dextrose, dextrose, glucagon, glucagon, heparin, heparin flush, HYDROmorphone, ondansetron, oxyCODONE   Results from last 7 days   Lab Units 06/09/24  0524 06/08/24  0514 06/07/24  1251   SODIUM mmol/L 136   < >  --    POTASSIUM mmol/L 4.0   < >  --    CHLORIDE mmol/L 101   < >  --    CO2 mmol/L " 30   < >  --    BUN mg/dL 17   < >  --    CREATININE mg/dL 1.59*   < >  --    CALCIUM mg/dL 8.5*   < >  --    PROTEIN TOTAL g/dL  --   --  5.6*   BILIRUBIN TOTAL mg/dL  --   --  1.0   ALK PHOS U/L  --   --  102   ALT U/L  --   --  35   AST U/L  --   --  24   GLUCOSE mg/dL 112*   < >  --     < > = values in this interval not displayed.      Results from last 7 days   Lab Units 06/09/24  0524 06/08/24  1704 06/08/24  0514   MAGNESIUM mg/dL 2.33 1.68 1.77      Results from last 7 days   Lab Units 06/09/24  0524 06/08/24  1704 06/08/24  0514   WBC AUTO x10*3/uL 5.5 6.6 6.7   HEMOGLOBIN g/dL 8.1* 7.2* 6.9*   HEMATOCRIT % 23.9* 21.8* 21.3*   PLATELETS AUTO x10*3/uL 227 211 208        Assessment & Plan:   Patient is 79 y.o. female with ESRD who is admitted to hospital for elective lap cholecystectomy. Nephrology consulted in view of ESRD.     ESRD  -Patient was on Monday Wednesday Friday at [facility] but has been in rehab in [place] and on TTS schedule there.        Anemia of ESRD and myelodysplastic syndrome  She is on MARILYNN with dialysis  She requires fairly frequent PRBC infusion     CKD-MBD  -Patient is currently not on phosphorus binder     Recommendations:   -Hemodialysis today,  and then  back on her TTS schedule that she has at [facility]  PRBC transfusion for Hgb <7   Next HD on Tuesday           Please message me through [employer] chat with any questions or concerns.     Luann Painting MD  6/9/2024  8:25 PM     [facility]    [address], [address]   [place] [address]  Office: [phone]

## 2024-06-10 NOTE — PROGRESS NOTES
"      Nephrology Progress Note      Nephrology following for ESRD .   Events over night:     Seen in ICU bed. Resting comfortably. Easily awakens and in NAD. Will be moving to step down today.       /67   Pulse 74   Temp 36.8 °C (98.2 °F)   Resp 22   Ht 1.549 m (5' 0.98\")   Wt 66.4 kg (146 lb 6.2 oz)   SpO2 93%   BMI 27.67 kg/m²     Input / Output:  24 HR:   Intake/Output Summary (Last 24 hours) at 6/10/2024 1334  Last data filed at 6/10/2024 0916  Gross per 24 hour   Intake 674.83 ml   Output 850 ml   Net -175.17 ml       Physical Exam   Alert and oriented x 3 NAD  Neck: no JVD  CV: RRR  Lungs: CTA bilaterally  Abd: soft, NT, ND   Ext: no  lower extremity edema    Scheduled medications  acetaminophen, 975 mg, oral, q8h  apixaban, 2.5 mg, oral, q12h  ascorbic acid, 500 mg, oral, Daily  carvedilol, 25 mg, oral, BID  cholecalciferol, 2,000 Units, oral, Daily  cyanocobalamin, 1,000 mcg, oral, Daily  docusate sodium, 100 mg, oral, BID  folic acid, 1 mg, oral, Daily  heparin, 2,000 Units, intra-catheter, After Dialysis  heparin, 2,000 Units, intra-catheter, After Dialysis  insulin lispro, 0-10 Units, subcutaneous, TID  losartan, 50 mg, oral, Daily  metroNIDAZOLE, 500 mg, intravenous, q8h  multivitamin with minerals, 1 tablet, oral, Daily  oxygen, , inhalation, Continuous - Inhalation  polyethylene glycol, 17 g, oral, Daily  pravastatin, 40 mg, oral, Nightly  pregabalin, 100 mg, oral, BID  zinc sulfate, 50 mg of elemental zinc, oral, Daily      Continuous medications       PRN medications  PRN medications: bisacodyl, dextrose, dextrose, glucagon, glucagon, heparin flush, HYDROmorphone, ondansetron, oxyCODONE   Results from last 7 days   Lab Units 06/10/24  0456   SODIUM mmol/L 135*   POTASSIUM mmol/L 3.7   CHLORIDE mmol/L 102   CO2 mmol/L 28   BUN mg/dL 25*   CREATININE mg/dL 2.02*   CALCIUM mg/dL 8.6   PROTEIN TOTAL g/dL 5.2*   BILIRUBIN TOTAL mg/dL 0.5   ALK PHOS U/L 81   ALT U/L 15   AST U/L 12   GLUCOSE " mg/dL 115*      Results from last 7 days   Lab Units 06/10/24  0456 06/09/24  0524 06/08/24  1704   MAGNESIUM mg/dL 2.04 2.33 1.68      Results from last 7 days   Lab Units 06/10/24  0456 06/09/24  0524 06/08/24  1704   WBC AUTO x10*3/uL 5.0 5.5 6.6   HEMOGLOBIN g/dL 8.0* 8.1* 7.2*   HEMATOCRIT % 24.3* 23.9* 21.8*   PLATELETS AUTO x10*3/uL 241 227 211        Assessment & Plan:   Patient is 79 y.o. female with ESRD who is admitted to hospital for elective lap cholecystectomy. Nephrology consulted in view of ESRD.     ESRD  -Patient was on Monday Wednesday Friday at [facility] but has been in rehab in [place] and on TTS schedule there.        Anemia of ESRD and myelodysplastic syndrome  She is on MARILYNN with dialysis  She requires fairly frequent PRBC infusion     CKD-MBD  -Patient is currently not on phosphorus binder     Recommendations:   -Hemodialysis tomorrow,  and continue on her TTS schedule that she has at [facility]              Please message me through [employer] chat with any questions or concerns.     Josue Ramirez PA-C  6/10/2024  1:34 PM     [facility]    [address], [address]   [place] [address]  Office: [phone]

## 2024-06-10 NOTE — CONSULTS
Reason For Consult  Anemia, any contraindication for anticoagulation    History Of Present Illness  Tana Hargrove is a 79 y.o. female with a significant past medical history of HTN, HLD, CAD, paroxysmal atrial fibrillation (not on AC d/t anemia and need for recurrent blood transfusion), HFpEF, COPD (no baseline O2), NIDDM2, ESRD on HD, chronic anemia requiring Procrit and intermittent PRBC infusions (in setting of MDS and ESRD), OA, anxiety / depression, GERD, and chronic cholecystitis who presented 06/06/24 for laparoscopic fenestrated subtotal chidi I/s/o acute on chronic cholecystitis. Patient current on Hospital Day #2, being followed by surgery (primary) and nephrology (for HD).       Patient was  transfer from  --> ICU in setting of AF RVR, etiology evaluation done and suggested to start anticoagulant.    Hematology consultation requested to evaluate for possible contraindication to anticoagulant and chronic anemia.    And has a history of chronic anemia due to myelodysplastic syndrome and ESRD and patient is receiving Procrit injection as needed.    No history of bleeding no history of blood clot.    Medical record reviewed.     Past Medical History  She has a past medical history of Abnormal levels of other serum enzymes, Acute kidney failure ([facility]), Anemia, CKD (chronic kidney disease), COPD (chronic obstructive pulmonary disease) (Multi), Coronary artery disease, Disease of blood and blood-forming organs, unspecified, HLD (hyperlipidemia), Hypertension, MDS (myelodysplastic syndrome) (Multi), Personal history of other diseases of the musculoskeletal system and connective tissue, Personal history of other specified conditions, Personal history of other specified conditions, and Type 2 diabetes mellitus (Multi).    She has no past medical history of Adverse effect of anesthesia, Arthritis, Asthma ([facility]), Awareness under anesthesia, CHF (congestive heart failure) (Multi), Delayed emergence from  "general anesthesia, Disease of thyroid gland, Hard to intubate, History of transfusion, Malignant hyperthermia, PONV (postoperative nausea and vomiting), Pseudocholinesterase deficiency, Spinal headache, or Stroke (Multi).    Surgical History  She has a past surgical history that includes Other surgical history (12/13/2019); Other surgical history (12/13/2019); Other surgical history (Bilateral, 12/13/2019); Other surgical history (Left, 12/13/2019); Other surgical history (12/13/2019); Other surgical history (12/13/2019); Other surgical history (Right, 12/13/2019); Other surgical history (04/16/2021); MR angio head wo IV contrast (11/20/2020); MR angio neck wo IV contrast (11/20/2020); Breast biopsy (Left); Hysterectomy; Breast lumpectomy; Appendectomy; Colonoscopy; Oophorectomy; and Joint replacement.     Social History  She reports that she has never smoked. She has never used smokeless tobacco. She reports that she does not currently use alcohol. She reports that she does not use drugs.    Family History  No family history on file.     Allergies  Codeine, Hydrocodone, Hydrocodone-acetaminophen, Meperidine (pf), Tramadol, Piperacillin-tazobactam, Adhesive tape-silicones, and Meperidine    Review of Systems  Complaining of weakness and fatigue, some headache, no chest pain or shortness of breath, no abdominal pain     Physical Exam  Vitals are stable    HEENT examination normal limits    Neck is supple no cervical lymphadenopathy    Lung examination did show good air movement on the both side    Heart with normal regular rhythm    Abdomen is soft, nontender no hepatosplenomegaly, normotonic bowel sound    Lymphatic no peripheral lymphadenopathy    Extremity no edema cyanosis     Last Recorded Vitals  Blood pressure 141/57, pulse 66, temperature 36.8 °C (98.2 °F), resp. rate 19, height 1.549 m (5' 0.98\"), weight 66.4 kg (146 lb 6.2 oz), SpO2 93%.    Relevant Results   Latest Reference Range & Units 06/10/24 04:56 "   GLUCOSE 74 - 99 mg/dL 115 (H)   SODIUM 136 - 145 mmol/L 135 (L)   POTASSIUM 3.5 - 5.3 mmol/L 3.7   CHLORIDE 98 - 107 mmol/L 102   Bicarbonate 21 - 32 mmol/L 28   Anion Gap 10 - 20 mmol/L 9 (L)   Blood Urea Nitrogen 6 - 23 mg/dL 25 (H)   Creatinine 0.50 - 1.05 mg/dL 2.02 (H)   EGFR >60 mL/min/1.73m*2 25 (L)   Calcium 8.6 - 10.3 mg/dL 8.6   PHOSPHORUS 2.5 - 4.9 mg/dL 2.8   Albumin 3.4 - 5.0 g/dL 2.9 (L)   Alkaline Phosphatase 33 - 136 U/L 81   ALT 7 - 45 U/L 15   AST 9 - 39 U/L 12   Bilirubin Total 0.0 - 1.2 mg/dL 0.5   Bilirubin, Direct 0.0 - 0.3 mg/dL 0.1   Total Protein 6.4 - 8.2 g/dL 5.2 (L)   MAGNESIUM 1.60 - 2.40 mg/dL 2.04   Heparin Unfractionated See Comment Below for Therapeutic Ranges IU/mL 0.5   LEUKOCYTES (10*3/UL) IN BLOOD BY AUTOMATED COUNT, [place] 4.4 - 11.3 x10*3/uL 5.0   nRBC 0.0 - 0.0 /100 WBCs 0.0   ERYTHROCYTES (10*6/UL) IN BLOOD BY AUTOMATED COUNT, [place] 4.00 - 5.20 x10*6/uL 2.69 (L)   HEMOGLOBIN 12.0 - 16.0 g/dL 8.0 (L)   HEMATOCRIT 36.0 - 46.0 % 24.3 (L)   MCV 80 - 100 fL 90   MCH 26.0 - 34.0 pg 29.7   MCHC 32.0 - 36.0 g/dL 32.9   RED CELL DISTRIBUTION WIDTH 11.5 - 14.5 % 21.2 (H)   PLATELETS (10*3/UL) IN BLOOD AUTOMATED COUNT, [place] 150 - 450 x10*3/uL 241   NEUTROPHILS/100 LEUKOCYTES IN BLOOD BY AUTOMATED COUNT, [place] 40.0 - 80.0 % 58.6   Immature Granulocytes %, Automated 0.0 - 0.9 % 0.4   Lymphocytes % 13.0 - 44.0 % 24.7   Monocytes % 2.0 - 10.0 % 9.9   Eosinophils % 0.0 - 6.0 % 6.0   Basophils % 0.0 - 2.0 % 0.4   NEUTROPHILS (10*3/UL) IN BLOOD BY AUTOMATED COUNT, [place] 1.60 - 5.50 x10*3/uL 2.95   Immature Granulocytes Absolute, Automated 0.00 - 0.50 x10*3/uL 0.02   Lymphocytes Absolute 0.80 - 3.00 x10*3/uL 1.24   Monocytes Absolute 0.05 - 0.80 x10*3/uL 0.50   Eosinophils Absolute 0.00 - 0.40 x10*3/uL 0.30   Basophils Absolute 0.00 - 0.10 x10*3/uL 0.02   RBC Morphology  See Below   (H): Data is abnormally high  (L): Data is abnormally low     Assessment/Plan     #1 chronic  anemia due to myelodysplastic syndrome and ESRD    2.  ESRD on hemodialysis    3.  Atrial fibrillation    4.  History of chronic cholecystitis status postcholecystectomy, hypertension.    In terms of anemia we will continue Procrit injection during dialysis and monitor CBC.    There is no contraindication for anticoagulant, we have to watch very closely patient is slightly high risk of bleeding because of platelet dysfunction.    Discussed with the patient    I spent 45 minutes in the professional and overall care of this patient.      Azeem Medina MD

## 2024-06-10 NOTE — CARE PLAN
The patient's goals for the shift include      The clinical goals for the shift include pts heartrate will remain less than 100      Problem: Skin  Goal: Decreased wound size/increased tissue granulation at next dressing change  Outcome: Progressing  Goal: Promote/optimize nutrition  Outcome: Progressing  Goal: Promote skin healing  6/9/2024 2051 by Malinda Quintana RN  Outcome: Progressing  6/9/2024 2050 by Malinda Quintana RN  Flowsheets (Taken 6/9/2024 2050)  Promote skin healing: Turn/reposition every 2 hours/use positioning/transfer devices     Problem: Pain  Goal: Takes deep breaths with improved pain control throughout the shift  Outcome: Progressing  Goal: Turns in bed with improved pain control throughout the shift  Outcome: Progressing  Goal: Walks with improved pain control throughout the shift  Outcome: Progressing  Goal: Performs ADL's with improved pain control throughout shift  Outcome: Progressing  Goal: Participates in PT with improved pain control throughout the shift  Outcome: Progressing  Goal: Free from opioid side effects throughout the shift  Outcome: Progressing  Goal: Free from acute confusion related to pain meds throughout the shift  Outcome: Progressing     Problem: Respiratory  Goal: Clear secretions with interventions this shift  Outcome: Progressing  Goal: Minimize anxiety/maximize coping throughout shift  Outcome: Progressing  Goal: Minimal/no exertional discomfort or dyspnea this shift  Outcome: Progressing  Goal: No signs of respiratory distress (eg. Use of accessory muscles. Peds grunting)  Outcome: Progressing  Goal: Patent airway maintained this shift  Outcome: Progressing  Goal: Tolerate mechanical ventilation evidenced by VS/agitation level this shift  Outcome: Progressing  Goal: Tolerate pulmonary toileting this shift  Outcome: Progressing  Goal: Verbalize decreased shortness of breath this shift  Outcome: Progressing  Goal: Wean oxygen to maintain O2 saturation per order/standard  this shift  Outcome: Progressing  Goal: Increase self care and/or family involvement in next 24 hours  Outcome: Progressing     Problem: Pain - Adult  Goal: Verbalizes/displays adequate comfort level or baseline comfort level  Outcome: Progressing     Problem: Discharge Planning  Goal: Discharge to home or other facility with appropriate resources  Outcome: Progressing     Problem: Chronic Conditions and Co-morbidities  Goal: Patient's chronic conditions and co-morbidity symptoms are monitored and maintained or improved  Outcome: Progressing     Problem: Diabetes  Goal: Achieve decreasing blood glucose levels by end of shift  Outcome: Progressing  Goal: Increase stability of blood glucose readings by end of shift  Outcome: Progressing  Goal: Decrease in ketones present in urine by end of shift  Outcome: Progressing  Goal: Maintain electrolyte levels within acceptable range throughout shift  Outcome: Progressing  Goal: Maintain glucose levels >70mg/dl to <250mg/dl throughout shift  Outcome: Progressing  Goal: No changes in neurological exam by end of shift  Outcome: Progressing  Goal: Learn about and adhere to nutrition recommendations by end of shift  Outcome: Progressing  Goal: Vital signs within normal range for age by end of shift  Outcome: Progressing  Goal: Increase self care and/or family involovement by end of shift  Outcome: Progressing  Goal: Receive DSME education by end of shift  Outcome: Progressing     Problem: Nutrition  Goal: Less than 5 days NPO/clear liquids  Outcome: Progressing  Goal: Consume prescribed supplement  Outcome: Progressing  Goal: BG  mg/dL  Outcome: Progressing  Goal: Promote healing  Outcome: Progressing  Goal: Maintain stable weight  Outcome: Progressing

## 2024-06-10 NOTE — PROGRESS NOTES
Medication Adjustment    The following medication(s) was/were adjusted for Tana Hargrove after discussion with the provider due to altered renal function and patient's age.     Medication(s) adjusted:   Apixaban reduced from 5 mg BID to 2.5 mg BID     Yelena Brantley, PharmD

## 2024-06-10 NOTE — PROGRESS NOTES
Tana Hargrove [id number]   Service: Internal Medicine / Hospitalist Date of service: 6/10/2024                                  Full Code                           Subjective       History Of Present Illness:    Tana Hargrove is a 79 y.o. female with a significant past medical history of HTN, HLD, CAD, paroxysmal atrial fibrillation (not on AC d/t anemia and need for recurrent blood transfusion), HFpEF, COPD (no baseline O2), NIDDM2, ESRD on HD, chronic anemia requiring Procrit and intermittent PRBC infusions (in setting of MDS and ESRD), OA, anxiety / depression, GERD, and chronic cholecystitis who presented 06/06/24 for laparoscopic fenestrated subtotal chidi I/s/o acute on chronic cholecystitis. Patient current on Hospital Day #2, being followed by surgery (primary) and nephrology (for HD). Medicine was consulted for perioperative medical management and assistance with management of atrial fibrillation with RVR, acute on chronic anemia. Cardiology also consulted on HD #2.     Patient seen and examined awaiting transfer from  --> ICU in setting of AF RVR, acute on chronic anemia.  Patient reports that she has felt generally weak, rundown, and has had overall very poor appetite and intake following her surgery.  She also admits to an ongoing degree of mild abdominal pain and discomfort which contributes to her not wanting to take in as much.  Very mild nausea, no emesis.  Since her operation, she has been on oxygen which she does not normally wear at home, but it has been slowly decreasing in the amount she needs and she does not report feeling acutely short of breath or winded.  She denies any chest pain or pressure, palpitations, dizziness or lightheadedness, diaphoresis, headache, vision changes, focal or lateralizing sensorimotor deficits.  She did undergo dialysis today, and felt that that went well.  Overall she feels a bit discouraged regarding how she feels right now.        6/9................No  reported : palpitations, headaches, syncope, chest pains, nausea or vomiting.Patient endorsed mild abdominal discomfort but voiced no other complaints.     6/10.....................Patient seen post JANE drain removal.  She complains of follow-up post removal related pain and discomfort but overall doing well.  No reported headaches, chest pains, palpitations, nausea or vomiting.     Review of Systems:   Review of system otherwise negative if not aforementioned above in subjective.     Objective        Physical Exam      Constitutional:       Appearance: Patient appeared in no acute cardiopulmonary distress.     Comments: Patient alert and oriented to person place time and situation.Patient appeared nontoxic.  HEENT:      Head: Normocephalic and atraumatic.Trachea midline      Nose:No observed congestion or rhinorrhea.     Mouth/Throat: Mucous membranes Moist, Trachea appeared  midline.  Eyes:      Extraocular Movements: Extraocular movements intact.      Pupils: Pupils are equal, round, and reactive to light.      Comments: No scleral icterus or conjunctival injection appreciated.   Cardiovascular:      Rate and Rhythm: Normal rate and regular rhythm. No clicks rubs or gallops, normal S1 and S2.No peripheral stigmata of endocarditis appreciated.     Pulmonary:      Lungs appeared clear to auscultation, no adventitious sound appreciated.  Abdominal:      General: Abdomen soft, nontender, active bowel sounds, no involuntary guarding or rebound tenderness appreciated.     Comments: Mild tenderness on palpation, right upper quadrant.  Musculoskeletal:       Patient appeared to have full active range of motion for upper and lower extremities, no acute apparent joint deformity appreciated on examination.   No pitting edema or cyanosis appreciated.       Lymphadenopathy:      No appreciable palpable lymphadenopathy  Skin:     General: Skin is warm.      Coloration:  No jaundice     Findings: No abnormal appearing skin  rashes or lesions that appeared acute noted on unclothed area of the skin..   Neurological:      General: No focal sensory or motor deficits appreciated, no meningeal signs or dysmetria noted.      Cranial Nerves: Cranial nerves II to XII appearing grossly intact.     Genitals:  Deferred  Psychiatric:         The patient appears to be displaying normal mood and affect at the time of evaluation.     Labs:           Lab Results   Component Value Date     GLUCOSE 112 (H) 06/09/2024     CALCIUM 8.5 (L) 06/09/2024      06/09/2024     K 4.0 06/09/2024     CO2 30 06/09/2024      06/09/2024     BUN 17 06/09/2024     CREATININE 1.59 (H) 06/09/2024            Lab Results   Component Value Date    GLUCOSE 115 (H) 06/10/2024    CALCIUM 8.6 06/10/2024     (L) 06/10/2024    K 3.7 06/10/2024    CO2 28 06/10/2024     06/10/2024    BUN 25 (H) 06/10/2024    CREATININE 2.02 (H) 06/10/2024                                             [unfilled]   [unfilled]   Susceptibility data from last 90 days.  Collected Specimen Info Organism Amoxicillin/Clavulanate Ampicillin Ampicillin/Sulbactam Cefazolin Cefazolin (uncomplicated UTIs only) Ciprofloxacin Gentamicin Levofloxacin Nitrofurantoin Piperacillin/Tazobactam   05/20/24 Blood culture from Peripheral Venipuncture Klebsiella pneumoniae/variicola  S  R  S  S    I  S  S    S   04/27/24 Fluid from Aspirate Klebsiella pneumoniae/variicola S R S S   I S S   S   04/14/24 Urine from Clean Catch/Voided Escherichia coli S R I S S S S   S S      Collected Specimen Info Organism Trimethoprim/Sulfamethoxazole   05/20/24 Blood culture from Peripheral Venipuncture Klebsiella pneumoniae/variicola  S   04/27/24 Fluid from Aspirate Klebsiella pneumoniae/variicola S   04/14/24 Urine from Clean Catch/Voided Escherichia coli S                   X-rays/ Images     [unfilled]   Radiology Results (last 21 days)     Procedure Component Value Units Date/Time   XR chest 1 view  [[id number]] Collected: 06/08/24 1816   Order Status: Completed Updated: 06/08/24 1816   Narrative:     Interpreted By:  Josefa Reyna,  STUDY:  XR CHEST 1 VIEW; ;  6/8/2024 4:44 pm      INDICATION:  Signs/Symptoms:Afib, POD2 s/p lap chidi.      COMPARISON:  05/19/2024      ACCESSION NUMBER(S):  [id number]      ORDERING CLINICIAN:  ANNIE HENRY      TECHNIQUE:  Portable chest      FINDINGS:  Tunneled right chest dual lumen catheter unchanged, distal tip  projects in the right atrium. Right hemidiaphragm elevation is again  noted. Patchy left basal consolidation is increased. Increased  perihilar interstitial opacities. Probable small pleural effusions.  No pneumothorax. Unchanged cardiomediastinal silhouette. No acute  osseous finding.       Impression:     Probable interstitial edema/venous congestion and small pleural  effusions. Increased left basal opacities may be atelectasis or  pneumonia.          Signed by: Josefa Reyna 6/8/2024 6:15 PM  Dictation workstation:   [id number]   US gallbladder [[id number]] Collected: 05/19/24 1643   Order Status: Completed Updated: 05/19/24 1738   Narrative:     STUDY:  Right upper quadrant Ultrasound; 5/19/2024 4:28 PM  INDICATION:  Status post cholecystostomy, elevated LFTs.  COMPARISON:  CT A&P today, US gallbladder 4/18/2024, MR abdomen 4/18/2024.  ACCESSION NUMBER(S):  [id number]  ORDERING CLINICIAN:  CELIA WYNNE  TECHNIQUE:  Ultrasound of the right upper quadrant.  Multiple images of the right  upper quadrant were obtained.  FINDINGS:  The liver demonstrates normal echogenicity.       The gallbladder contains sludge as well as multiple stones.  There is  edematous wall thickening.  The cholecystectomy tube is visualized  within the gallbladder.  Sonographic Luna's sign is negative.       The common bile duct measures 0.3 cm.  There is no intrahepatic  biliary dilatation.       The pancreas is not well seen due to overlying bowel gas.     The right kidney measures  9.8 cm in length.  Renal cortical thinning  is present and echotexture is increased.  There is no hydronephrosis.  There are no stones.  There is a simple cyst measuring 1.2 x 1.0 x 0.8  cm within the midportion.   Impression:     Sludge and stones within the gallbladder with edematous wall  thickening.  No biliary dilatation is appreciated.  Increased echogenicity and thinning of the right renal cortex  consistent with intrinsic renal disease.  No hydronephrosis.  Signed by Lonnie Rodriguez MD                  Medical Problems         Problem List         * (Principal) Chronic cholecystitis     Lumbago     A-fib (Multi)     Anxiety     Chronic diastolic heart failure of unknown etiology (Multi) (Chronic)     Cervical spondylosis without myelopathy     Coronary artery disease     Degeneration of intervertebral disc of lumbar region     Hypertension, essential (Chronic)     Frequent falls     GERD (gastroesophageal reflux disease) (Chronic)     Hyperlipidemia (Chronic)     Inability to ambulate due to multiple joints     Jerking movements of extremities     Spinal stenosis of lumbar region     Lumbar spondylosis     Macrocytic anemia     Myelodysplastic syndrome (Multi) (Chronic)     Myofascial pain syndrome     Occasional tremors     Osteoporosis     Stage 4 chronic kidney disease (Multi) (Chronic)     Weakness generalized     Gout     Scoliosis of lumbar region due to degenerative disease of spine in adult     Depression     Lower extremity edema     Diabetes mellitus with complication (Multi)     COPD without exacerbation (Multi) (Chronic)     Primary osteoarthritis of right knee     Fall     Acute kidney failure, unspecified (CMS-HCC)     Muscle weakness (generalized)     Primary osteoarthritis     Repeated falls     Paroxysmal atrial fibrillation (Multi)     Chronic diastolic (congestive) heart failure (Multi)     Chronic obstructive pulmonary disease, unspecified (Multi)     CKD (chronic kidney disease)      Normocytic anemia     Essential (primary) hypertension     HLD (hyperlipidemia)     Type 2 diabetes mellitus with hyperglycemia, without long-term current use of insulin (Multi)     Type 2 diabetes mellitus without complications (Multi)     Obesity (BMI 30-39.9)     Cellulitis of toe of right foot     Acute kidney injury (nontraumatic) (CMS-[facility])     ESRD (end stage renal disease) on dialysis (Multi) (Chronic)     Anemia due to chronic kidney disease, on chronic dialysis (Multi) (Chronic)     Thickening of wall of gallbladder     Allergy, unspecified, sequela     Anaphylactic shock, unspecified, sequela     Articular cartilage disorder of hip     Bone marrow disorder     Coagulation defect, unspecified (Multi)     Contact with and (suspected) exposure to covid-19     Fracture of bone of hip (Multi)     History of anemia     Hyperkalemia     Pain of lower extremity     Pain, unspecified     Plantar fasciitis     Pneumonia due to infectious organism     Chest pain     Acute pulmonary edema (Multi)     Elevated bilirubin     Atrial fibrillation with rapid ventricular response (Multi)                  Above medical problems may be reflective of historical medical problems that may have resolved and may not related to acute clinical condition/medical problems.     Clinical impression/plan:        Atrial Fibrillation with RVR   -Likely multifactorial in acute on chronic anemia, stress from surgical intervention, electrolyte derangements (hypomagnesemia has improved, hypophosphatemia on most recent labs), etc  -Overall patient feels weak, has continued abdominal discomfort from her surgery, and poor appetite; however, she is not endorsing any chest pain, palpitations, dizziness or lightheadedness, diaphoresis, or symptoms consistent with her A-fib as she has had these since her postoperative period began  -Patient will be continued on a Cardizem drip (most recent echocardiogram without evidence of LV dysfunction or WMAs),  can continue home medications as p.o. intake improves.  May utilize pushes of IV metoprolol as needed if HR remains elevated despite cardizem   -Continue to trend electrolytes closely, monitor on telemetry.  -Will check TSH in the morning as well out of abundance of caution  -Patient is not on oral anticoagulation for A-fib in setting of her chronic anemia and need for blood transfusions  -Cardiology and critical care also consulted on this patient     Acute on chronic anemia, multifactorial in setting of baseline ESRD, MDS, recent surgery  -Patient's baseline hemoglobin varies and she does frequently require PRBC infusions and Procrit; most recently she was in the low sevens  -Earlier today, the patient's hemoglobin was 6.6 --> she went for dialysis and the plan per the surgery team was to recheck her H&H and transfuse as needed at a later time (given that she does get MARILYNN/PRBCs with dialysis); however, with interval development of A-fib RVR she was transfused 1 unit of blood  -There has been no overt evidence of bleeding per patient or nursing, including hemoptysis, hematemesis, melena, hematochezia, etc.  -Will continue to trend H&H  -Transfusion goal as per nephrology and cardiology, but would recommend trying to maintain at least 7.0 pending their input     Chronic cholecystitis s/p laparoscopic fenestrated subtotal chidi (06/06/24), ongoing abdominal discomfort   -Patient is on a clear liquid diet but does not have much drive to have intake at this time due to ongoing abdominal discomfort  -She denies any specific nausea or vomiting  -She is utilizing incentive spirometry  -Majority of management including current antibiotics and nutrition as per primary service     HTN, HLD, CAD, Chronic HFpEF   -Blood pressure has been predominantly well-controlled throughout hospitalization  -As mentioned above, no current chest pain or anginal equivalents; is to be continued on home therapies  -Does not appear volume  overloaded on examination currently, but does have some diminished breath sounds and CXR is showing evidence of some congestion.  Given diminished intake, will hold off on aggressive IV diuresis for therapies at this time, but moving forward may need to consider if volume status worsens versus adjusting volume status with HD  -Cardiology is following the patient as well     ESRD on HD  -Nephrology is following  -Continue home therapies as appropriate     COPD without acute exacerbation  -Patient is slightly diminished on auscultation, but not overtly wheezing and does have fair aeration  -Continue home therapies with caution in regard to albuterol usage with A-fib  -Continue with incentive spirometry, pulmonary hygiene, ambulation as tolerated     NIDDM-II   -Agree with mild SSI as appetite and intake improves  -Continue with hypoglycemic protocol, Accu-Cheks  -Monitor and adjust as needed     Anxiety and depression   -Continue home therapies      Electrolyte derangements: Hypomagnesemia, hypophosphatemia  -Hypomagnesemia: Patient did have a low mag level at 1.37; however, was replaced and did rise to 1.68.  With A-fib history, would start additional replacement with goal to be around the 2.0 range  -Hypophosphatemia: Phos 3.1 this morning, following dialysis and with repeat electrolytes did downtrend to 1.9.  Will order replacement and recheck. Note: as ordering, order for replacement was just added. Repeat labs to follow.      Disposition/additional care plan/interventions: 6/9/2024        Tana Hargrove is a 79 y.o. female with a significant past medical history s/f HTN, HLD, CAD, paroxysmal atrial fibrillation (not on AC d/t anemia and need for recurrent blood transfusion), HFpEF,  NIDDM2, ESRD on HD, chronic anemia requiring Procrit and intermittent PRBC infusions (in setting of MDS and ESRD), OA, anxiety / depression, GERD, and chronic cholecystitis who presented 06/06/24 for laparoscopic fenestrated subtotal  chidi I/s/o acute on chronic cholecystitis. Followed by surgery (primary) and nephrology (for HD). Medicine was consulted for perioperative medical management and assistance with management of atrial fibrillation with RVR, acute on chronic anemia. Cardiology  and Intensivist also consulted.     Continue current stewardship and postoperative care as per general surgery.     Patient cleared sinus rhythm overnight.     Continue renal replacement therapy as recommended by nephrology.     Continue to monitor H&H closely.     Reported COPD history deemed spurious by intensivist/pulmonologist: Outpatient follow-up with family physician pulmonary function testing may be of value to further evaluate diagnosis.     Electrolyte derangement improved continue to monitor magnesium.     Will continue rate control therapy for atrial fibrillation anticoagulation therapy recommended once okay with general surgery.      Disposition/additional care plan/interventions: 6/10/2024      BP slightly elevated at the time of evaluation likely transient in nature we will continue monitoring closely.    Physical therapy and Occupational Therapy as soon as okay with primary service/surgical service.    Continue to monitor for signs of bleeding, anticoagulation therapy needed currently on heparin drip.    Transition to oral Eliquis once okay with surgical service  Hospitalist service will continue to follow while inpatient    The patient was informed of differential diagnosis , work up , plan of care and possible sequelae of clinical disposition.Patient in agreement with plan of care. Further recommendations forthcoming in accordance with patient's clinical disposition and response to care.     Discharge planning:Discharge time in accordance recommendations by general surgery     Care time: < 55 mins.              Dictation performed with assistance of voice recognition device therefore transcription errors are possible

## 2024-06-10 NOTE — CARE PLAN
The patient's goals for the shift include      The clinical goals for the shift include Patient will remain hemodymanically stable throughout shift      Problem: Skin  Goal: Decreased wound size/increased tissue granulation at next dressing change  Outcome: Progressing  Flowsheets (Taken 6/10/2024 0912)  Decreased wound size/increased tissue granulation at next dressing change: Protective dressings over bony prominences  Goal: Promote/optimize nutrition  Outcome: Progressing  Flowsheets (Taken 6/10/2024 0912)  Promote/optimize nutrition: Consume > 50% meals/supplements  Goal: Promote skin healing  Outcome: Progressing  Flowsheets (Taken 6/10/2024 0912)  Promote skin healing: Turn/reposition every 2 hours/use positioning/transfer devices     Problem: Pain  Goal: Takes deep breaths with improved pain control throughout the shift  Outcome: Progressing  Goal: Turns in bed with improved pain control throughout the shift  Outcome: Progressing  Goal: Walks with improved pain control throughout the shift  Outcome: Progressing  Goal: Performs ADL's with improved pain control throughout shift  Outcome: Progressing  Goal: Participates in PT with improved pain control throughout the shift  Outcome: Progressing  Goal: Free from opioid side effects throughout the shift  Outcome: Progressing  Goal: Free from acute confusion related to pain meds throughout the shift  Outcome: Progressing     Problem: Respiratory  Goal: Clear secretions with interventions this shift  Outcome: Progressing  Goal: Minimize anxiety/maximize coping throughout shift  Outcome: Progressing  Goal: Minimal/no exertional discomfort or dyspnea this shift  Outcome: Progressing  Goal: No signs of respiratory distress (eg. Use of accessory muscles. Peds grunting)  Outcome: Progressing  Goal: Patent airway maintained this shift  Outcome: Progressing  Goal: Tolerate mechanical ventilation evidenced by VS/agitation level this shift  Outcome: Progressing  Goal:  Tolerate pulmonary toileting this shift  Outcome: Progressing  Goal: Verbalize decreased shortness of breath this shift  Outcome: Progressing  Goal: Wean oxygen to maintain O2 saturation per order/standard this shift  Outcome: Progressing  Goal: Increase self care and/or family involvement in next 24 hours  Outcome: Progressing     Problem: Pain - Adult  Goal: Verbalizes/displays adequate comfort level or baseline comfort level  Outcome: Progressing     Problem: Discharge Planning  Goal: Discharge to home or other facility with appropriate resources  Outcome: Progressing     Problem: Chronic Conditions and Co-morbidities  Goal: Patient's chronic conditions and co-morbidity symptoms are monitored and maintained or improved  Outcome: Progressing     Problem: Diabetes  Goal: Achieve decreasing blood glucose levels by end of shift  Outcome: Progressing  Goal: Increase stability of blood glucose readings by end of shift  Outcome: Progressing  Goal: Decrease in ketones present in urine by end of shift  Outcome: Progressing  Goal: Maintain electrolyte levels within acceptable range throughout shift  Outcome: Progressing  Goal: Maintain glucose levels >70mg/dl to <250mg/dl throughout shift  Outcome: Progressing  Goal: No changes in neurological exam by end of shift  Outcome: Progressing  Goal: Learn about and adhere to nutrition recommendations by end of shift  Outcome: Progressing  Goal: Vital signs within normal range for age by end of shift  Outcome: Progressing  Goal: Increase self care and/or family involovement by end of shift  Outcome: Progressing  Goal: Receive DSME education by end of shift  Outcome: Progressing     Problem: Nutrition  Goal: Less than 5 days NPO/clear liquids  Outcome: Progressing  Goal: Consume prescribed supplement  Outcome: Progressing  Goal: BG  mg/dL  Outcome: Progressing  Goal: Promote healing  Outcome: Progressing  Goal: Maintain stable weight  Outcome: Progressing

## 2024-06-11 ENCOUNTER — APPOINTMENT (OUTPATIENT)
Dept: DIALYSIS | Facility: HOSPITAL | Age: 79
End: 2024-06-11
Payer: MEDICARE

## 2024-06-11 VITALS
RESPIRATION RATE: 18 BRPM | HEIGHT: 61 IN | DIASTOLIC BLOOD PRESSURE: 73 MMHG | BODY MASS INDEX: 28.18 KG/M2 | HEART RATE: 77 BPM | WEIGHT: 149.25 LBS | OXYGEN SATURATION: 93 % | TEMPERATURE: 98.9 F | SYSTOLIC BLOOD PRESSURE: 156 MMHG

## 2024-06-11 LAB
ANION GAP SERPL CALC-SCNC: 9 MMOL/L (ref 10–20)
BASOPHILS # BLD AUTO: 0.02 X10*3/UL (ref 0–0.1)
BASOPHILS NFR BLD AUTO: 0.5 %
BUN SERPL-MCNC: 29 MG/DL (ref 6–23)
CALCIUM SERPL-MCNC: 8.9 MG/DL (ref 8.6–10.3)
CHLORIDE SERPL-SCNC: 104 MMOL/L (ref 98–107)
CO2 SERPL-SCNC: 27 MMOL/L (ref 21–32)
CREAT SERPL-MCNC: 2.15 MG/DL (ref 0.5–1.05)
EGFRCR SERPLBLD CKD-EPI 2021: 23 ML/MIN/1.73M*2
EOSINOPHIL # BLD AUTO: 0.26 X10*3/UL (ref 0–0.4)
EOSINOPHIL NFR BLD AUTO: 6.2 %
ERYTHROCYTE [DISTWIDTH] IN BLOOD BY AUTOMATED COUNT: 20.8 % (ref 11.5–14.5)
GLUCOSE BLD MANUAL STRIP-MCNC: 103 MG/DL (ref 74–99)
GLUCOSE BLD MANUAL STRIP-MCNC: 170 MG/DL (ref 74–99)
GLUCOSE BLD MANUAL STRIP-MCNC: 227 MG/DL (ref 74–99)
GLUCOSE BLD MANUAL STRIP-MCNC: 231 MG/DL (ref 74–99)
GLUCOSE SERPL-MCNC: 94 MG/DL (ref 74–99)
HCT VFR BLD AUTO: 25.2 % (ref 36–46)
HGB BLD-MCNC: 8.3 G/DL (ref 12–16)
HYPOCHROMIA BLD QL SMEAR: NORMAL
IMM GRANULOCYTES # BLD AUTO: 0.03 X10*3/UL (ref 0–0.5)
IMM GRANULOCYTES NFR BLD AUTO: 0.7 % (ref 0–0.9)
LYMPHOCYTES # BLD AUTO: 1.34 X10*3/UL (ref 0.8–3)
LYMPHOCYTES NFR BLD AUTO: 32 %
MAGNESIUM SERPL-MCNC: 2.08 MG/DL (ref 1.6–2.4)
MCH RBC QN AUTO: 29.6 PG (ref 26–34)
MCHC RBC AUTO-ENTMCNC: 32.9 G/DL (ref 32–36)
MCV RBC AUTO: 90 FL (ref 80–100)
MONOCYTES # BLD AUTO: 0.51 X10*3/UL (ref 0.05–0.8)
MONOCYTES NFR BLD AUTO: 12.2 %
NEUTROPHILS # BLD AUTO: 2.03 X10*3/UL (ref 1.6–5.5)
NEUTROPHILS NFR BLD AUTO: 48.4 %
NRBC BLD-RTO: 1 /100 WBCS (ref 0–0)
OVALOCYTES BLD QL SMEAR: NORMAL
PHOSPHATE SERPL-MCNC: 3.2 MG/DL (ref 2.5–4.9)
PLATELET # BLD AUTO: 235 X10*3/UL (ref 150–450)
POTASSIUM SERPL-SCNC: 3.8 MMOL/L (ref 3.5–5.3)
PREALB SERPL-MCNC: 12.8 MG/DL (ref 18–40)
RBC # BLD AUTO: 2.8 X10*6/UL (ref 4–5.2)
RBC MORPH BLD: NORMAL
SODIUM SERPL-SCNC: 136 MMOL/L (ref 136–145)
WBC # BLD AUTO: 4.2 X10*3/UL (ref 4.4–11.3)

## 2024-06-11 PROCEDURE — 36415 COLL VENOUS BLD VENIPUNCTURE: CPT | Performed by: SURGERY

## 2024-06-11 PROCEDURE — 99232 SBSQ HOSP IP/OBS MODERATE 35: CPT | Performed by: HOSPITALIST

## 2024-06-11 PROCEDURE — 2500000001 HC RX 250 WO HCPCS SELF ADMINISTERED DRUGS (ALT 637 FOR MEDICARE OP): Performed by: SURGERY

## 2024-06-11 PROCEDURE — 85025 COMPLETE CBC W/AUTO DIFF WBC: CPT | Performed by: SURGERY

## 2024-06-11 PROCEDURE — 1100000001 HC PRIVATE ROOM DAILY

## 2024-06-11 PROCEDURE — 80051 ELECTROLYTE PANEL: CPT | Performed by: SURGERY

## 2024-06-11 PROCEDURE — 2500000001 HC RX 250 WO HCPCS SELF ADMINISTERED DRUGS (ALT 637 FOR MEDICARE OP): Performed by: INTERNAL MEDICINE

## 2024-06-11 PROCEDURE — 84134 ASSAY OF PREALBUMIN: CPT | Mod: PORLAB | Performed by: SURGERY

## 2024-06-11 PROCEDURE — 97110 THERAPEUTIC EXERCISES: CPT | Mod: GP,CQ | Performed by: PHYSICAL THERAPY ASSISTANT

## 2024-06-11 PROCEDURE — 2500000001 HC RX 250 WO HCPCS SELF ADMINISTERED DRUGS (ALT 637 FOR MEDICARE OP): Performed by: PHARMACIST

## 2024-06-11 PROCEDURE — 2500000004 HC RX 250 GENERAL PHARMACY W/ HCPCS (ALT 636 FOR OP/ED): Performed by: SURGERY

## 2024-06-11 PROCEDURE — 2500000005 HC RX 250 GENERAL PHARMACY W/O HCPCS: Performed by: SURGERY

## 2024-06-11 PROCEDURE — 82947 ASSAY GLUCOSE BLOOD QUANT: CPT | Mod: 91

## 2024-06-11 PROCEDURE — 84100 ASSAY OF PHOSPHORUS: CPT | Performed by: SURGERY

## 2024-06-11 PROCEDURE — 97530 THERAPEUTIC ACTIVITIES: CPT | Mod: GP,CQ | Performed by: PHYSICAL THERAPY ASSISTANT

## 2024-06-11 PROCEDURE — 99232 SBSQ HOSP IP/OBS MODERATE 35: CPT | Performed by: PHYSICIAN ASSISTANT

## 2024-06-11 PROCEDURE — 83735 ASSAY OF MAGNESIUM: CPT | Performed by: SURGERY

## 2024-06-11 PROCEDURE — 99024 POSTOP FOLLOW-UP VISIT: CPT | Performed by: SURGERY

## 2024-06-11 PROCEDURE — 8010000001 HC DIALYSIS - HEMODIALYSIS PER DAY

## 2024-06-11 RX ORDER — BISACODYL 10 MG/1
10 SUPPOSITORY RECTAL ONCE
Status: COMPLETED | OUTPATIENT
Start: 2024-06-11 | End: 2024-06-11

## 2024-06-11 RX ADMIN — OXYCODONE HYDROCHLORIDE AND ACETAMINOPHEN 500 MG: 500 TABLET ORAL at 08:08

## 2024-06-11 RX ADMIN — ACETAMINOPHEN 975 MG: 325 TABLET ORAL at 07:25

## 2024-06-11 RX ADMIN — Medication 50 MG OF ELEMENTAL ZINC: at 08:08

## 2024-06-11 RX ADMIN — BISACODYL 10 MG: 10 SUPPOSITORY RECTAL at 09:36

## 2024-06-11 RX ADMIN — PREGABALIN 100 MG: 50 CAPSULE ORAL at 08:08

## 2024-06-11 RX ADMIN — CARVEDILOL 25 MG: 25 TABLET, FILM COATED ORAL at 08:08

## 2024-06-11 RX ADMIN — ACETAMINOPHEN 975 MG: 325 TABLET ORAL at 22:02

## 2024-06-11 RX ADMIN — LOSARTAN POTASSIUM 50 MG: 50 TABLET, FILM COATED ORAL at 08:08

## 2024-06-11 RX ADMIN — PREGABALIN 100 MG: 50 CAPSULE ORAL at 22:02

## 2024-06-11 RX ADMIN — CARVEDILOL 25 MG: 25 TABLET, FILM COATED ORAL at 22:02

## 2024-06-11 RX ADMIN — FOLIC ACID 1 MG: 1 TABLET ORAL at 08:08

## 2024-06-11 RX ADMIN — HEPARIN SODIUM 1600 UNITS: 1000 INJECTION INTRAVENOUS; SUBCUTANEOUS at 17:00

## 2024-06-11 RX ADMIN — DOCUSATE SODIUM 100 MG: 100 CAPSULE, LIQUID FILLED ORAL at 22:02

## 2024-06-11 RX ADMIN — INSULIN LISPRO 2 UNITS: 100 INJECTION, SOLUTION INTRAVENOUS; SUBCUTANEOUS at 16:01

## 2024-06-11 RX ADMIN — PRAVASTATIN SODIUM 40 MG: 40 TABLET ORAL at 22:02

## 2024-06-11 RX ADMIN — Medication 2000 UNITS: at 08:08

## 2024-06-11 RX ADMIN — ACETAMINOPHEN 975 MG: 325 TABLET ORAL at 16:00

## 2024-06-11 RX ADMIN — APIXABAN 2.5 MG: 2.5 TABLET, FILM COATED ORAL at 22:03

## 2024-06-11 RX ADMIN — CYANOCOBALAMIN TAB 1000 MCG 1000 MCG: 1000 TAB at 08:08

## 2024-06-11 RX ADMIN — Medication 2 L/MIN: at 20:00

## 2024-06-11 RX ADMIN — Medication 1 TABLET: at 08:08

## 2024-06-11 RX ADMIN — APIXABAN 2.5 MG: 2.5 TABLET, FILM COATED ORAL at 09:36

## 2024-06-11 ASSESSMENT — COGNITIVE AND FUNCTIONAL STATUS - GENERAL
HELP NEEDED FOR BATHING: A LOT
PERSONAL GROOMING: A LOT
DAILY ACTIVITIY SCORE: 13
MOVING FROM LYING ON BACK TO SITTING ON SIDE OF FLAT BED WITH BEDRAILS: A LITTLE
MOVING TO AND FROM BED TO CHAIR: A LOT
MOBILITY SCORE: 14
DRESSING REGULAR UPPER BODY CLOTHING: A LOT
MOVING TO AND FROM BED TO CHAIR: A LOT
CLIMB 3 TO 5 STEPS WITH RAILING: TOTAL
DRESSING REGULAR UPPER BODY CLOTHING: A LOT
TOILETING: A LOT
DRESSING REGULAR LOWER BODY CLOTHING: A LOT
HELP NEEDED FOR BATHING: A LOT
STANDING UP FROM CHAIR USING ARMS: A LOT
EATING MEALS: A LITTLE
DAILY ACTIVITIY SCORE: 13
STANDING UP FROM CHAIR USING ARMS: A LITTLE
WALKING IN HOSPITAL ROOM: A LOT
PERSONAL GROOMING: A LOT
STANDING UP FROM CHAIR USING ARMS: A LITTLE
MOVING FROM LYING ON BACK TO SITTING ON SIDE OF FLAT BED WITH BEDRAILS: A LITTLE
EATING MEALS: A LITTLE
EATING MEALS: A LITTLE
MOVING FROM LYING ON BACK TO SITTING ON SIDE OF FLAT BED WITH BEDRAILS: A LITTLE
MOBILITY SCORE: 14
STANDING UP FROM CHAIR USING ARMS: A LOT
TURNING FROM BACK TO SIDE WHILE IN FLAT BAD: A LOT
WALKING IN HOSPITAL ROOM: A LOT
MOVING TO AND FROM BED TO CHAIR: A LOT
MOBILITY SCORE: 13
CLIMB 3 TO 5 STEPS WITH RAILING: A LOT
TOILETING: A LOT
DAILY ACTIVITIY SCORE: 13
PERSONAL GROOMING: A LOT
DRESSING REGULAR LOWER BODY CLOTHING: A LOT
CLIMB 3 TO 5 STEPS WITH RAILING: A LOT
CLIMB 3 TO 5 STEPS WITH RAILING: TOTAL
MOBILITY SCORE: 13
TURNING FROM BACK TO SIDE WHILE IN FLAT BAD: A LITTLE
TURNING FROM BACK TO SIDE WHILE IN FLAT BAD: A LITTLE
MOVING TO AND FROM BED TO CHAIR: A LOT
MOVING FROM LYING ON BACK TO SITTING ON SIDE OF FLAT BED WITH BEDRAILS: A LITTLE
DRESSING REGULAR LOWER BODY CLOTHING: A LOT
WALKING IN HOSPITAL ROOM: A LOT
TURNING FROM BACK TO SIDE WHILE IN FLAT BAD: A LOT
DRESSING REGULAR UPPER BODY CLOTHING: A LOT
TOILETING: A LOT
HELP NEEDED FOR BATHING: A LOT
WALKING IN HOSPITAL ROOM: A LOT

## 2024-06-11 ASSESSMENT — ENCOUNTER SYMPTOMS
WEAKNESS: 0
SHORTNESS OF BREATH: 0
ABDOMINAL PAIN: 1
WHEEZING: 0
FEVER: 0
DIARRHEA: 0
ORTHOPNEA: 0
VOMITING: 0
NAUSEA: 0
PALPITATIONS: 0
DYSURIA: 0

## 2024-06-11 ASSESSMENT — PAIN - FUNCTIONAL ASSESSMENT
PAIN_FUNCTIONAL_ASSESSMENT: 0-10

## 2024-06-11 ASSESSMENT — PAIN SCALES - GENERAL
PAINLEVEL_OUTOF10: 0 - NO PAIN

## 2024-06-11 NOTE — PROCEDURES
Report from Sending RN:    Report From: Susie Sanders  Recent Surgery of Procedure:   Yes, Cholecystostomy  Baseline Level of Consciousness (LOC): x4  Oxygen Use: No  Type: RA  Diabetic: Yes  Last BP Med Given Day of Dialysis:   See EMAR  Last Pain Med Given: See EMAR  Lab Tests to be Obtained with Dialysis: No  Blood Transfusion to be Given During Dialysis: No  Available IV Access: Yes  Medications to be Administered During Dialysis: No  Continuous IV Infusion Running: No  Restraints on Currently or in the Last 24 Hours: No  Hand-Off Communication: Pt stable for HD  Dialysis Catheter Dressing:   Will check prior to HD  Last Dressing Change:   Will check prior to HD

## 2024-06-11 NOTE — PROCEDURES
Report to Receiving RN:    Report To: Tang Lynch RN  Time Report Called: [id number]  Hand-Off Communication:   Pt tolerated tx well. Removed 2L.  Post vitals: 163/53 (69) - 64.3 kg - 97.7*F  Complications During Treatment: No  Ultrafiltration Treatment: No  Medications Administered During Dialysis: No  Blood Products Administered During Dialysis: No  Labs Sent During Dialysis: No  Heparin Drip Rate Changes: NA  Dialysis Catheter Dressing: Dated 06/06/2024 and initialed TE  Last Dressing Change: 06/06/2024    Electronic Signatures:  Macey Waller OCDT    Last Updated: 5:48 PM by MACEY WALLER

## 2024-06-11 NOTE — PROGRESS NOTES
Auth request sent to [facility] to start auth for patient to admit to [facility].

## 2024-06-11 NOTE — PROGRESS NOTES
Physical Therapy    Physical Therapy Treatment    Patient Name: Tana Hargrove  MRN: [id number]  Today's Date: 6/11/2024  Time Calculation  Start Time: 0725  Stop Time: 0748  Time Calculation (min): 23 min    Assessment/Plan   PT Assessment  End of Session Communication: Bedside nurse  Assessment Comment: pt demonstrated improved safety and mobility this session. She is motivated and would benefit form further skilled PT services.  End of Session Patient Position: Up in chair, Alarm on     PT Plan  Treatment/Interventions: Bed mobility, Transfer training, Balance training, Strengthening, Endurance training (PROGRESS TO OOB ASSESSMENT WHEN APPROPRIATE)  PT Plan: Skilled PT  PT Frequency: 4 times per week  PT Discharge Recommendations: Moderate intensity level of continued care  PT Recommended Transfer Status: Assist x2 (BED LEVEL)  PT - OK to Discharge: Yes (WHEN MEDICALLY CLEARED)      General Visit Information:   PT  Visit  PT Received On: 06/11/24  Response to Previous Treatment: Patient with no complaints from previous session.  General  Reason for Referral: impaired mobility, gait training  Referred By: ELAINE  Past Medical History Relevant to Rehab: CHRONIC CHOLECYSTITIS; DX: LAP CHORE/KAREN ON 6/6; HX: CAD, HTN, COPD, ESRD ON HS, DM, MDS GOUT, NEUROPATHY, FALLS  Prior to Session Communication: Bedside nurse  Patient Position Received: Bed, 3 rail up, Alarm on  Preferred Learning Style: verbal  General Comment: pt reported she is feeling better today. pt reported she will have dialysis,      Precautions:  Precautions  Medical Precautions: Fall precautions    Pain:  Pain Assessment  Pain Assessment: 0-10  Pain Score: 0 - No pain  Cognition:  Cognition  Overall Cognitive Status: Within Functional Limits            Treatments:  Therapeutic Exercise  Therapeutic Exercise Performed: Yes  Therapeutic Exercise Activity 1: ankle pumps x 15 ea  Therapeutic Exercise Activity 2: LAQ x15 ea  Therapeutic Exercise Activity 3:  Marches x 15 ea  Therapeutic Exercise Activity 4: hip ab/adduction x 15 ea  Therapeutic Exercise Activity 5: glut sets x 15 ea    Bed Mobility  Bed Mobility: Yes  Bed Mobility 1  Bed Mobility 1: Supine to sitting  Level of Assistance 1: Minimum assistance, Minimal verbal cues  Bed Mobility Comments 1: cues for safety and technique. pt with improved use of bed rails and hand placement.    Ambulation/Gait Training  Ambulation/Gait Training Performed: Yes  Ambulation/Gait Training 1  Surface 1: Level tile  Device 1: Rolling walker  Assistance 1: Moderate assistance, Moderate verbal cues  Quality of Gait 1: Diminished heel strike, Decreased step length, Forward flexed posture  Comments/Distance (ft) 1: 5'x1 with cues for posture and walker safety.  Transfers  Transfer: Yes  Transfer 1  Technique 1: Sit to stand, Stand to sit  Transfer Device 1: Walker  Transfer Level of Assistance 1: Moderate verbal cues, Moderate assistance  Trials/Comments 1: cues for hand placement and safety.  Transfers 2  Technique 2: Stand pivot  Transfer Device 2: Walker  Transfer Level of Assistance 2: Moderate assistance, +2, Moderate verbal cues  Trials/Comments 2: cues for walker safety and technique.    Outcome Measures:  [facility] Basic Mobility  Turning from your back to your side while in a flat bed without using bedrails: A little  Moving from lying on your back to sitting on the side of a flat bed without using bedrails: A little  Moving to and from bed to chair (including a wheelchair): A lot  Standing up from a chair using your arms (e.g. wheelchair or bedside chair): A little  To walk in hospital room: A lot  Climbing 3-5 steps with railing: Total  Basic Mobility - Total Score: 14    Education Documentation  Mobility Training, taught by Radha Etienne PTA at 6/11/2024  8:19 AM.  Learner: Patient  Readiness: Acceptance  Method: Explanation  Response: Verbalizes Understanding, Needs Reinforcement    Education Comments  No comments  found.               Encounter Problems       Encounter Problems (Active)       Balance       Goal 1 (Progressing)       Start:  06/08/24    Expected End:  06/22/24       SIT EOB CGA FOR 12 MIN WHILE COMPLETING EX AND FUNCTIONAL ACTIVITIES TO  IMPROVE BALANCE, STRENGTH AND ENDURANCE FOR PROGRESS ION TO OOB ACTIVITIES            Mobility       Goal 1 (Progressing)       Start:  06/08/24    Expected End:  06/29/24       20 REPS AROM/RROM EX IMPROVING STRENGTH TO  PROGRESS OOB ACTIVITIES            PT Transfers       STG - Patient to transfer to and from sit to supine (Progressing)       Start:  06/08/24    Expected End:  06/15/24       MOD X1-2 USING RAILS         STG - Patient will transfer sit to and from stand (Progressing)       Start:  06/08/24    Expected End:  06/22/24       FWW MOD X2 A USING PROPER TECHNIQUE            Pain - Adult

## 2024-06-11 NOTE — CARE PLAN
The patient's goals for the shift include      The clinical goals for the shift include Pt will remain hemodynamically stable throughout ths shift      Problem: Skin  Goal: Decreased wound size/increased tissue granulation at next dressing change  6/10/2024 2123 by Silvia Edwards RN  Outcome: Progressing  Flowsheets (Taken 6/10/2024 2000)  Decreased wound size/increased tissue granulation at next dressing change: Protective dressings over bony prominences  6/10/2024 0912 by Silvia Edwards RN  Outcome: Progressing  Flowsheets (Taken 6/10/2024 0912)  Decreased wound size/increased tissue granulation at next dressing change: Protective dressings over bony prominences  Goal: Promote/optimize nutrition  6/10/2024 2123 by Silvia Edwards RN  Outcome: Progressing  Flowsheets (Taken 6/10/2024 2123)  Promote/optimize nutrition: Consume > 50% meals/supplements  6/10/2024 0912 by Silvia Edwards RN  Outcome: Progressing  Flowsheets (Taken 6/10/2024 0912)  Promote/optimize nutrition: Consume > 50% meals/supplements  Goal: Promote skin healing  6/10/2024 2123 by Silvia Edwards RN  Outcome: Progressing  Flowsheets (Taken 6/10/2024 2123)  Promote skin healing:   Turn/reposition every 2 hours/use positioning/transfer devices   Assess skin/pad under line(s)/device(s)   Rotate device position/do not position patient on device  6/10/2024 0912 by Silvia Edwards RN  Outcome: Progressing  Flowsheets (Taken 6/10/2024 0912)  Promote skin healing: Turn/reposition every 2 hours/use positioning/transfer devices     Problem: Pain  Goal: Takes deep breaths with improved pain control throughout the shift  6/10/2024 2123 by Silvia Edwards RN  Outcome: Progressing  6/10/2024 0912 by Silvia Edwards RN  Outcome: Progressing  Goal: Turns in bed with improved pain control throughout the shift  6/10/2024 2123 by Silvia Edwards RN  Outcome: Progressing  6/10/2024 0912 by Silvia Edwards  RN  Outcome: Progressing  Goal: Walks with improved pain control throughout the shift  6/10/2024 2123 by Silvia Edwards RN  Outcome: Progressing  6/10/2024 0912 by Silvia Edwards RN  Outcome: Progressing  Goal: Performs ADL's with improved pain control throughout shift  6/10/2024 2123 by Silvia Edwards RN  Outcome: Progressing  6/10/2024 0912 by Silvia Edwards RN  Outcome: Progressing  Goal: Participates in PT with improved pain control throughout the shift  6/10/2024 2123 by Silvia Edwards RN  Outcome: Progressing  6/10/2024 0912 by Silvia Edwards RN  Outcome: Progressing  Goal: Free from opioid side effects throughout the shift  6/10/2024 2123 by Slivia Edwards RN  Outcome: Progressing  6/10/2024 0912 by Silvia Edwards RN  Outcome: Progressing  Goal: Free from acute confusion related to pain meds throughout the shift  6/10/2024 2123 by Silvia Edwards RN  Outcome: Progressing  6/10/2024 0912 by Silvia Edwards RN  Outcome: Progressing     Problem: Respiratory  Goal: Clear secretions with interventions this shift  6/10/2024 2123 by Silvia Edwards RN  Outcome: Progressing  6/10/2024 0912 by Silvia Edwards RN  Outcome: Progressing  Goal: Minimize anxiety/maximize coping throughout shift  6/10/2024 2123 by Silvia Edwards RN  Outcome: Progressing  6/10/2024 0912 by Silvia Edwards RN  Outcome: Progressing  Goal: Minimal/no exertional discomfort or dyspnea this shift  6/10/2024 2123 by Silvia Edwards RN  Outcome: Progressing  6/10/2024 0912 by Silvia Edwards RN  Outcome: Progressing  Goal: No signs of respiratory distress (eg. Use of accessory muscles. Peds grunting)  6/10/2024 2123 by Silvia Edwards RN  Outcome: Progressing  6/10/2024 0912 by Silvia Edwards RN  Outcome: Progressing  Goal: Patent airway maintained this shift  6/10/2024 2123 by Silvia Edwards, RN  Outcome:  Progressing  6/10/2024 0912 by Silvia Edwards RN  Outcome: Progressing  Goal: Tolerate mechanical ventilation evidenced by VS/agitation level this shift  6/10/2024 2123 by Silvia Edwards RN  Outcome: Progressing  6/10/2024 0912 by Silvia Edwards RN  Outcome: Progressing  Goal: Tolerate pulmonary toileting this shift  6/10/2024 2123 by Silvia Edwards RN  Outcome: Progressing  6/10/2024 0912 by Silvia Edwards RN  Outcome: Progressing  Goal: Verbalize decreased shortness of breath this shift  6/10/2024 2123 by Silvia Edwards RN  Outcome: Progressing  6/10/2024 0912 by Silvia Edwards RN  Outcome: Progressing  Goal: Wean oxygen to maintain O2 saturation per order/standard this shift  6/10/2024 2123 by Silvia Edwards RN  Outcome: Progressing  6/10/2024 0912 by Silvia Edwards RN  Outcome: Progressing  Goal: Increase self care and/or family involvement in next 24 hours  6/10/2024 2123 by Silvia Edwards RN  Outcome: Progressing  6/10/2024 0912 by Silvia Edwards RN  Outcome: Progressing     Problem: Pain - Adult  Goal: Verbalizes/displays adequate comfort level or baseline comfort level  6/10/2024 2123 by Silvia Edwards RN  Outcome: Progressing  6/10/2024 0912 by Silvia Edwards RN  Outcome: Progressing     Problem: Discharge Planning  Goal: Discharge to home or other facility with appropriate resources  6/10/2024 2123 by Silvia Edwards RN  Outcome: Progressing  6/10/2024 0912 by Silvia Edwards RN  Outcome: Progressing     Problem: Chronic Conditions and Co-morbidities  Goal: Patient's chronic conditions and co-morbidity symptoms are monitored and maintained or improved  6/10/2024 2123 by Silvia Edwards RN  Outcome: Progressing  6/10/2024 0912 by Silvia Edwards RN  Outcome: Progressing     Problem: Diabetes  Goal: Achieve decreasing blood glucose levels by end of shift  6/10/2024 2123 by Silvia Edwards  RN  Outcome: Progressing  6/10/2024 0912 by Silvia Edwards RN  Outcome: Progressing  Goal: Increase stability of blood glucose readings by end of shift  6/10/2024 2123 by Silvia Edwards RN  Outcome: Progressing  6/10/2024 0912 by Silvia Edwards RN  Outcome: Progressing  Goal: Decrease in ketones present in urine by end of shift  6/10/2024 2123 by Silvia Edwards RN  Outcome: Progressing  6/10/2024 0912 by Silvia Edwards RN  Outcome: Progressing  Goal: Maintain electrolyte levels within acceptable range throughout shift  6/10/2024 2123 by Silvia Edwards RN  Outcome: Progressing  6/10/2024 0912 by Silvia Edwards RN  Outcome: Progressing  Goal: Maintain glucose levels >70mg/dl to <250mg/dl throughout shift  6/10/2024 2123 by Silvia Edwards RN  Outcome: Progressing  6/10/2024 0912 by Silvia Edwards RN  Outcome: Progressing  Goal: No changes in neurological exam by end of shift  6/10/2024 2123 by Silvia Edwards RN  Outcome: Progressing  6/10/2024 0912 by Silvia Edwards RN  Outcome: Progressing  Goal: Learn about and adhere to nutrition recommendations by end of shift  6/10/2024 2123 by Silvia Edwards RN  Outcome: Progressing  6/10/2024 0912 by Silvia Edwards RN  Outcome: Progressing  Goal: Vital signs within normal range for age by end of shift  6/10/2024 2123 by Silvia Edwards RN  Outcome: Progressing  6/10/2024 0912 by Silvia Edwards RN  Outcome: Progressing  Goal: Increase self care and/or family involovement by end of shift  6/10/2024 2123 by Silvia Edwards RN  Outcome: Progressing  6/10/2024 0912 by Silvia Edwards RN  Outcome: Progressing  Goal: Receive DSME education by end of shift  6/10/2024 2123 by Silvia Edwards RN  Outcome: Progressing  6/10/2024 0912 by Silvia Edwards, RN  Outcome: Progressing     Problem: Nutrition  Goal: Less than 5 days NPO/clear liquids  6/10/2024 2123 by  Silvia Edwards RN  Outcome: Progressing  6/10/2024 0912 by Silvia Edwards RN  Outcome: Progressing  Goal: Consume prescribed supplement  6/10/2024 2123 by Silvia dEwards, RN  Outcome: Progressing  6/10/2024 0912 by Silvia Edwards RN  Outcome: Progressing  Goal: BG  mg/dL  6/10/2024 2123 by Silvia Edwards, RN  Outcome: Progressing  6/10/2024 0912 by Silvia Edwards RN  Outcome: Progressing  Goal: Promote healing  6/10/2024 2123 by Silvia Edwards, RN  Outcome: Progressing  6/10/2024 0912 by Silvia Edwards RN  Outcome: Progressing  Goal: Maintain stable weight  6/10/2024 2123 by Silvia Edwards, RN  Outcome: Progressing  6/10/2024 0912 by Silvia Edwards, RN  Outcome: Progressing

## 2024-06-11 NOTE — PROGRESS NOTES
Nephrology Progress Note    Following for ESRD.  Patient was seen during the dialysis treatment.  She denies chest pain, shortness of breath, or nausea.    Current Inpatient Medications:  Current Facility-Administered Medications Ordered in Epic   Medication Dose Route Frequency Provider Last Rate Last Admin    acetaminophen (Tylenol) tablet 975 mg  975 mg oral q8h Ronald Grimes MD   975 mg at 06/11/24 0725    apixaban (Eliquis) tablet 2.5 mg  2.5 mg oral q12h Yelena Brantley PharmD   2.5 mg at 06/11/24 0936    ascorbic acid (Vitamin C) tablet 500 mg  500 mg oral Daily Ronald Grimes MD   500 mg at 06/11/24 0808    bisacodyl (Dulcolax) suppository 10 mg  10 mg rectal Daily PRN Ronald Grimes MD        carvedilol (Coreg) tablet 25 mg  25 mg oral BID Facundo Martínez MD   25 mg at 06/11/24 0808    cholecalciferol (Vitamin D-3) tablet 2,000 Units  2,000 Units oral Daily Ronald Grimes MD   2,000 Units at 06/11/24 0808    cyanocobalamin (Vitamin B-12) tablet 1,000 mcg  1,000 mcg oral Daily Ronald Grimes MD   1,000 mcg at 06/11/24 0808    dextrose 50 % injection 12.5 g  12.5 g intravenous q15 min PRN Ronald Grimes MD        dextrose 50 % injection 25 g  25 g intravenous q15 min PRN Ronald Grimes MD        docusate sodium (Colace) capsule 100 mg  100 mg oral BID Ronald Grimes MD   100 mg at 06/10/24 2107    folic acid (Folvite) tablet 1 mg  1 mg oral Daily Ronald Grimes MD   1 mg at 06/11/24 0808    glucagon (Glucagen) injection 1 mg  1 mg intramuscular q15 min PRN Ronald Grimes MD        glucagon (Glucagen) injection 1 mg  1 mg intramuscular q15 min PRN Ronald Grimes MD        heparin 1,000 unit/mL injection 2,000 Units  2,000 Units intra-catheter After Dialysis Ronald Grimes MD   2,000 Units at 06/08/24 1424    heparin 1,000 unit/mL injection 2,000 Units  2,000 Units intra-catheter After Dialysis Ronald  "MD Sydney   2,000 Units at 24 1423    heparin flush 100 unit/mL syringe 160 Units  1.6 mL intra-catheter PRN Ronald Grimes MD        HYDROmorphone PF (Dilaudid) injection 0.2 mg  0.2 mg intravenous q4h PRN Ronald Grimes MD        insulin lispro (HumaLOG) injection 0-10 Units  0-10 Units subcutaneous TID Ronald Grimes MD   4 Units at 06/10/24 1126    losartan (Cozaar) tablet 50 mg  50 mg oral Daily Ronald Grimes MD   50 mg at 24 0808    multivitamin with minerals 1 tablet  1 tablet oral Daily Ronald Grimes MD   1 tablet at 24 0808    ondansetron (Zofran) injection 4 mg  4 mg intravenous q6h PRN Ronald Grimes MD        oxyCODONE (Roxicodone) immediate release tablet 5 mg  5 mg oral q6h PRN Ronald Grimes MD   5 mg at 24 0808    oxygen (O2) therapy   inhalation Continuous - Inhalation Ronald Grimes MD   1 L/min at 24 0723    polyethylene glycol (Glycolax, Miralax) packet 17 g  17 g oral Daily Ronald Grimes MD   17 g at 06/10/24 0846    pravastatin (Pravachol) tablet 40 mg  40 mg oral Nightly Ronald Grimes MD   40 mg at 06/10/24 210    pregabalin (Lyrica) capsule 100 mg  100 mg oral BID Ronald Grimes MD   100 mg at 24 0808    zinc sulfate (Zincate) capsule 50 mg of elemental zinc  50 mg of elemental zinc oral Daily Ronald Grimes MD   50 mg of elemental zinc at 24 0808     No current [facility]-ordered outpatient medications on file.         Vitals:   /69   Pulse 67   Temp 36.5 °C (97.7 °F) (Temporal)   Resp 12   Ht 1.549 m (5' 0.98\")   Wt 67.7 kg (149 lb 4 oz)   SpO2 92%   BMI 28.22 kg/m²   BLOOD PRESSURE RANGE:  Systolic (24hrs), Av , Min:117 , Max:158   ; Diastolic (24hrs), Av, Min:53, Max:72    24HR INTAKE/OUTPUT:    Intake/Output Summary (Last 24 hours) at 2024 1543  Last data filed at 2024 1323  Gross per 24 hour   Intake 600 ml   Output " 460 ml   Net 140 ml       Physical exam:   Constitutional: NAD  Skin: no rash, turgor wnl  Heent:  eomi, mmm  Neck: no bruits or jvd noted  Cardiovascular:  Normal S1, S2 without m/r/g  Respiratory: CTAB  Abdomen:  +bs, soft, nt, nd  Ext: No lower extremity edema        Data:   Labs:  Results for orders placed or performed during the hospital encounter of 06/06/24 (from the past 24 hour(s))   POCT GLUCOSE   Result Value Ref Range    POCT Glucose 90 74 - 99 mg/dL   POCT GLUCOSE   Result Value Ref Range    POCT Glucose 93 74 - 99 mg/dL   CBC and Auto Differential   Result Value Ref Range    WBC 4.2 (L) 4.4 - 11.3 x10*3/uL    nRBC 1.0 (H) 0.0 - 0.0 /100 WBCs    RBC 2.80 (L) 4.00 - 5.20 x10*6/uL    Hemoglobin 8.3 (L) 12.0 - 16.0 g/dL    Hematocrit 25.2 (L) 36.0 - 46.0 %    MCV 90 80 - 100 fL    MCH 29.6 26.0 - 34.0 pg    MCHC 32.9 32.0 - 36.0 g/dL    RDW 20.8 (H) 11.5 - 14.5 %    Platelets 235 150 - 450 x10*3/uL    Neutrophils % 48.4 40.0 - 80.0 %    Immature Granulocytes %, Automated 0.7 0.0 - 0.9 %    Lymphocytes % 32.0 13.0 - 44.0 %    Monocytes % 12.2 2.0 - 10.0 %    Eosinophils % 6.2 0.0 - 6.0 %    Basophils % 0.5 0.0 - 2.0 %    Neutrophils Absolute 2.03 1.60 - 5.50 x10*3/uL    Immature Granulocytes Absolute, Automated 0.03 0.00 - 0.50 x10*3/uL    Lymphocytes Absolute 1.34 0.80 - 3.00 x10*3/uL    Monocytes Absolute 0.51 0.05 - 0.80 x10*3/uL    Eosinophils Absolute 0.26 0.00 - 0.40 x10*3/uL    Basophils Absolute 0.02 0.00 - 0.10 x10*3/uL   Basic Metabolic Panel   Result Value Ref Range    Glucose 94 74 - 99 mg/dL    Sodium 136 136 - 145 mmol/L    Potassium 3.8 3.5 - 5.3 mmol/L    Chloride 104 98 - 107 mmol/L    Bicarbonate 27 21 - 32 mmol/L    Anion Gap 9 (L) 10 - 20 mmol/L    Urea Nitrogen 29 (H) 6 - 23 mg/dL    Creatinine 2.15 (H) 0.50 - 1.05 mg/dL    eGFR 23 (L) >60 mL/min/1.73m*2    Calcium 8.9 8.6 - 10.3 mg/dL   Magnesium   Result Value Ref Range    Magnesium 2.08 1.60 - 2.40 mg/dL   Phosphorus   Result Value  Ref Range    Phosphorus 3.2 2.5 - 4.9 mg/dL   Prealbumin   Result Value Ref Range    Prealbumin 12.8 (L) 18.0 - 40.0 mg/dL   Morphology   Result Value Ref Range    RBC Morphology See Below     Hypochromia Mild     Ovalocytes Few    POCT GLUCOSE   Result Value Ref Range    POCT Glucose 103 (H) 74 - 99 mg/dL   POCT GLUCOSE   Result Value Ref Range    POCT Glucose 227 (H) 74 - 99 mg/dL     *Note: Due to a large number of results and/or encounters for the requested time period, some results have not been displayed. A complete set of results can be found in Results Review.            Assessment and Plan:  The patient is a 79-year-old female with history of ESRD, type 2 diabetes mellitus, hypertension, atrial fibrillation, HFpEF, COPD, anemia, and gout.  Patient is admitted to the hospital on 6/8/2024 for laparoscopic cholecystectomy for acute on chronic cholecystitis.  Nephrology is following for ESRD and dialysis management.    ESRD.  Patient dialyzes on TTS schedule.  Patient seen to hemodialysis treatment today.  Patient was dialyzed with F1 60 dialyzer for 3.5 hours.  We used 3K dialysate.  2 L was ultrafiltered.  Patient tolerated dialysis well.        Please do not hesitate to contact me at [phone] if there is any question or concern.   Samuel Espinosa MD (Ramy Pearce MD)

## 2024-06-11 NOTE — PROGRESS NOTES
"Music Therapy Note    Tana Hargrove was referred by Palliative    Therapy Session  Referral Type: New referral this admission  Visit Type: Follow-up visit  Session Start Time: 1147  Session End Time: 1153  Intervention Delivery: In-person  Conflict of Service: None  Number of family members present: 0  Number of staff members present: 0     Pre-assessment  Unable to Assess Reason: Patient declined to answer  Mood/Affect: Cooperative, Guarded  Verbalized Emotional State:  (\"alright\")         Treatment/Interventions  Areas of Focus: Mood modification, Locus of control, Emotional support  Music Therapy Interventions: Live music listening  Interruption: No  Patient Fell Asleep at End of Session: No    Post-assessment  Unable to Assess Reason: Outcomes not evaluated  Mood/Affect: Calm, Cooperative, Participative  Verbalized Emotional State: Relief  Continue Visiting: Yes  Total Session Time (min): 6 minutes    Narrative  Assessment Detail: Upon arrival of the music therapist, pt was found in room awake, alert, displaying symptoms of stress via facial tension, tired/flat affect. Pt expressed that she was doing \"alright.\"  Plan: Music therapist provided live pt preferred music with a focus on providing psychosocial support to pt via offering locus of control.  Intervention: During intervention, pt participated via receptive listening via taking deeper breaths and passively listening as music therapist sang and played guitar to pt's preferred song.  Evaluation: Pt responded via displaying a decrease in tension, smiling and expressing gratitude.  Follow-up: Will follow up if apporpriate.    Education Documentation  No documentation found.          "

## 2024-06-11 NOTE — CARE PLAN
The patient's goals for the shift include      The clinical goals for the shift include pt will remain hemodynamically stable throughout this shift      Problem: Skin  Goal: Decreased wound size/increased tissue granulation at next dressing change  Outcome: Progressing  Flowsheets (Taken 6/11/2024 1245)  Decreased wound size/increased tissue granulation at next dressing change: Protective dressings over bony prominences  Goal: Promote/optimize nutrition  Outcome: Progressing  Flowsheets (Taken 6/11/2024 1245)  Promote/optimize nutrition: Monitor/record intake including meals  Goal: Promote skin healing  Outcome: Progressing  Flowsheets (Taken 6/11/2024 1245)  Promote skin healing: Turn/reposition every 2 hours/use positioning/transfer devices     Problem: Pain  Goal: Takes deep breaths with improved pain control throughout the shift  Outcome: Progressing  Goal: Turns in bed with improved pain control throughout the shift  Outcome: Progressing  Goal: Walks with improved pain control throughout the shift  Outcome: Progressing  Goal: Performs ADL's with improved pain control throughout shift  Outcome: Progressing  Goal: Participates in PT with improved pain control throughout the shift  Outcome: Progressing  Goal: Free from opioid side effects throughout the shift  Outcome: Progressing  Goal: Free from acute confusion related to pain meds throughout the shift  Outcome: Progressing     Problem: Respiratory  Goal: Clear secretions with interventions this shift  Outcome: Progressing  Goal: Minimize anxiety/maximize coping throughout shift  Outcome: Progressing  Goal: Minimal/no exertional discomfort or dyspnea this shift  Outcome: Progressing  Goal: No signs of respiratory distress (eg. Use of accessory muscles. Peds grunting)  Outcome: Progressing  Goal: Patent airway maintained this shift  Outcome: Progressing  Goal: Tolerate mechanical ventilation evidenced by VS/agitation level this shift  Outcome: Progressing  Goal:  Tolerate pulmonary toileting this shift  Outcome: Progressing  Goal: Verbalize decreased shortness of breath this shift  Outcome: Progressing  Goal: Wean oxygen to maintain O2 saturation per order/standard this shift  Outcome: Progressing  Goal: Increase self care and/or family involvement in next 24 hours  Outcome: Progressing     Problem: Pain - Adult  Goal: Verbalizes/displays adequate comfort level or baseline comfort level  Outcome: Progressing     Problem: Discharge Planning  Goal: Discharge to home or other facility with appropriate resources  Outcome: Progressing     Problem: Chronic Conditions and Co-morbidities  Goal: Patient's chronic conditions and co-morbidity symptoms are monitored and maintained or improved  Outcome: Progressing     Problem: Diabetes  Goal: Achieve decreasing blood glucose levels by end of shift  Outcome: Progressing  Goal: Increase stability of blood glucose readings by end of shift  Outcome: Progressing  Goal: Decrease in ketones present in urine by end of shift  Outcome: Progressing  Goal: Maintain electrolyte levels within acceptable range throughout shift  Outcome: Progressing  Goal: Maintain glucose levels >70mg/dl to <250mg/dl throughout shift  Outcome: Progressing  Goal: No changes in neurological exam by end of shift  Outcome: Progressing  Goal: Learn about and adhere to nutrition recommendations by end of shift  Outcome: Progressing  Goal: Vital signs within normal range for age by end of shift  Outcome: Progressing  Goal: Increase self care and/or family involovement by end of shift  Outcome: Progressing  Goal: Receive DSME education by end of shift  Outcome: Progressing     Problem: Nutrition  Goal: Less than 5 days NPO/clear liquids  Outcome: Progressing  Goal: Consume prescribed supplement  Outcome: Progressing  Goal: BG  mg/dL  Outcome: Progressing  Goal: Promote healing  Outcome: Progressing  Goal: Maintain stable weight  Outcome: Progressing

## 2024-06-11 NOTE — PROGRESS NOTES
Occupational Therapy                 Therapy Communication Note    Patient Name: Tana Hargrove  MRN: [id number]  Today's Date: 6/11/2024     Discipline: Occupational Therapy    Missed Visit Reason: Missed Visit Reason:  (receiving dialysis)    Missed Time: Attempt    Comment:

## 2024-06-11 NOTE — PROGRESS NOTES
Tana Hargrove [id number]   Service: Internal Medicine / Hospitalist Date of service: 6/11/2024                                  Full Code                           Subjective       History Of Present Illness:    Tana Hargrove is a 79 y.o. female with a significant past medical history of HTN, HLD, CAD, paroxysmal atrial fibrillation (not on AC d/t anemia and need for recurrent blood transfusion), HFpEF, COPD (no baseline O2), NIDDM2, ESRD on HD, chronic anemia requiring Procrit and intermittent PRBC infusions (in setting of MDS and ESRD), OA, anxiety / depression, GERD, and chronic cholecystitis who presented 06/06/24 for laparoscopic fenestrated subtotal chidi I/s/o acute on chronic cholecystitis. Patient current on Hospital Day #2, being followed by surgery (primary) and nephrology (for HD). Medicine was consulted for perioperative medical management and assistance with management of atrial fibrillation with RVR, acute on chronic anemia. Cardiology also consulted on HD #2.     Patient seen and examined awaiting transfer from  --> ICU in setting of AF RVR, acute on chronic anemia.  Patient reports that she has felt generally weak, rundown, and has had overall very poor appetite and intake following her surgery.  She also admits to an ongoing degree of mild abdominal pain and discomfort which contributes to her not wanting to take in as much.  Very mild nausea, no emesis.  Since her operation, she has been on oxygen which she does not normally wear at home, but it has been slowly decreasing in the amount she needs and she does not report feeling acutely short of breath or winded.  She denies any chest pain or pressure, palpitations, dizziness or lightheadedness, diaphoresis, headache, vision changes, focal or lateralizing sensorimotor deficits.  She did undergo dialysis today, and felt that that went well.  Overall she feels a bit discouraged regarding how she feels right now.        6/9................No  reported : palpitations, headaches, syncope, chest pains, nausea or vomiting.Patient endorsed mild abdominal discomfort but voiced no other complaints.     6/10.....................Patient seen post JANE drain removal.  She complains of follow-up post removal related pain and discomfort but overall doing well.  No reported headaches, chest pains, palpitations, nausea or vomiting.    6/11.................No acute complaints voiced by patient today.  No reported palpitations, headaches, chest pains, fevers, nausea or vomiting.  Patient reported that she had physical therapy today.     Review of Systems:   Review of system otherwise negative if not aforementioned above in subjective.     Objective        Physical Exam      Constitutional:       Appearance: Patient appeared in no acute cardiopulmonary distress.     Comments: Patient alert and oriented to person place time and situation.Patient appeared nontoxic, up in chair .  HEENT:      Head: Normocephalic and atraumatic.Trachea midline      Nose:No observed congestion or rhinorrhea.     Mouth/Throat: Mucous membranes Moist, Trachea appeared  midline.  Eyes:      Extraocular Movements: Extraocular movements intact.      Pupils: Pupils are equal, round, and reactive to light.      Comments: No scleral icterus or conjunctival injection appreciated.   Cardiovascular:      Rate and Rhythm: Normal rate and regular rhythm. No clicks rubs or gallops, normal S1 and S2.No peripheral stigmata of endocarditis appreciated.     Pulmonary:      Lungs appeared clear to auscultation, no adventitious sound appreciated.  Abdominal:      General: Abdomen soft, nondistended active bowel sounds, no involuntary guarding or rebound tenderness appreciated.     Comments: Exam limited by seated position.  Musculoskeletal:       Patient appeared to have full active range of motion for upper and lower extremities, no acute apparent joint deformity appreciated on examination.   No pitting edema or  cyanosis appreciated.       Lymphadenopathy:      No appreciable palpable lymphadenopathy  Skin:     General: Skin is warm.      Coloration:  No jaundice     Findings: No abnormal appearing skin rashes or lesions that appeared acute noted on unclothed area of the skin..   Neurological:      General: No focal sensory or motor deficits appreciated, no meningeal signs or dysmetria noted.      Cranial Nerves: Cranial nerves II to XII appearing grossly intact.     Genitals:  Deferred  Psychiatric:         The patient appears to be displaying normal mood and affect at the time of evaluation.     Labs:               Lab Results   Component Value Date     GLUCOSE 112 (H) 06/09/2024     CALCIUM 8.5 (L) 06/09/2024      06/09/2024     K 4.0 06/09/2024     CO2 30 06/09/2024      06/09/2024     BUN 17 06/09/2024     CREATININE 1.59 (H) 06/09/2024                      Lab Results   Component Value Date     GLUCOSE 115 (H) 06/10/2024     CALCIUM 8.6 06/10/2024      (L) 06/10/2024     K 3.7 06/10/2024     CO2 28 06/10/2024      06/10/2024     BUN 25 (H) 06/10/2024     CREATININE 2.02 (H) 06/10/2024                                                                    [unfilled]   [unfilled]   Susceptibility data from last 90 days.  Collected Specimen Info Organism Amoxicillin/Clavulanate Ampicillin Ampicillin/Sulbactam Cefazolin Cefazolin (uncomplicated UTIs only) Ciprofloxacin Gentamicin Levofloxacin Nitrofurantoin Piperacillin/Tazobactam   05/20/24 Blood culture from Peripheral Venipuncture Klebsiella pneumoniae/variicola  S  R  S  S    I  S  S    S   04/27/24 Fluid from Aspirate Klebsiella pneumoniae/variicola S R S S   I S S   S   04/14/24 Urine from Clean Catch/Voided Escherichia coli S R I S S S S   S S      Collected Specimen Info Organism Trimethoprim/Sulfamethoxazole   05/20/24 Blood culture from Peripheral Venipuncture Klebsiella pneumoniae/variicola  S   04/27/24 Fluid from Aspirate  Klebsiella pneumoniae/variicola S   04/14/24 Urine from Clean Catch/Voided Escherichia coli S                   X-rays/ Images     [unfilled]   Radiology Results (last 21 days)     Procedure Component Value Units Date/Time   XR chest 1 view [[id number]] Collected: 06/08/24 1816   Order Status: Completed Updated: 06/08/24 1816   Narrative:     Interpreted By:  Josefa Reyna,  STUDY:  XR CHEST 1 VIEW; ;  6/8/2024 4:44 pm      INDICATION:  Signs/Symptoms:Afib, POD2 s/p lap chidi.      COMPARISON:  05/19/2024      ACCESSION NUMBER(S):  [id number]      ORDERING CLINICIAN:  ANNIE HENRY      TECHNIQUE:  Portable chest      FINDINGS:  Tunneled right chest dual lumen catheter unchanged, distal tip  projects in the right atrium. Right hemidiaphragm elevation is again  noted. Patchy left basal consolidation is increased. Increased  perihilar interstitial opacities. Probable small pleural effusions.  No pneumothorax. Unchanged cardiomediastinal silhouette. No acute  osseous finding.       Impression:     Probable interstitial edema/venous congestion and small pleural  effusions. Increased left basal opacities may be atelectasis or  pneumonia.          Signed by: Josefa Reyna 6/8/2024 6:15 PM  Dictation workstation:   [id number]   US gallbladder [[id number]] Collected: 05/19/24 1643   Order Status: Completed Updated: 05/19/24 1738   Narrative:     STUDY:  Right upper quadrant Ultrasound; 5/19/2024 4:28 PM  INDICATION:  Status post cholecystostomy, elevated LFTs.  COMPARISON:  CT A&P today, US gallbladder 4/18/2024, MR abdomen 4/18/2024.  ACCESSION NUMBER(S):  [id number]  ORDERING CLINICIAN:  CELIA WNYNE  TECHNIQUE:  Ultrasound of the right upper quadrant.  Multiple images of the right  upper quadrant were obtained.  FINDINGS:  The liver demonstrates normal echogenicity.       The gallbladder contains sludge as well as multiple stones.  There is  edematous wall thickening.  The cholecystectomy tube is visualized  within the  gallbladder.  Sonographic Luna's sign is negative.       The common bile duct measures 0.3 cm.  There is no intrahepatic  biliary dilatation.       The pancreas is not well seen due to overlying bowel gas.     The right kidney measures 9.8 cm in length.  Renal cortical thinning  is present and echotexture is increased.  There is no hydronephrosis.  There are no stones.  There is a simple cyst measuring 1.2 x 1.0 x 0.8  cm within the midportion.   Impression:     Sludge and stones within the gallbladder with edematous wall  thickening.  No biliary dilatation is appreciated.  Increased echogenicity and thinning of the right renal cortex  consistent with intrinsic renal disease.  No hydronephrosis.  Signed by Lonnie Rodriguez MD                  Medical Problems         Problem List         * (Principal) Chronic cholecystitis     Lumbago     A-fib (Multi)     Anxiety     Chronic diastolic heart failure of unknown etiology (Multi) (Chronic)     Cervical spondylosis without myelopathy     Coronary artery disease     Degeneration of intervertebral disc of lumbar region     Hypertension, essential (Chronic)     Frequent falls     GERD (gastroesophageal reflux disease) (Chronic)     Hyperlipidemia (Chronic)     Inability to ambulate due to multiple joints     Jerking movements of extremities     Spinal stenosis of lumbar region     Lumbar spondylosis     Macrocytic anemia     Myelodysplastic syndrome (Multi) (Chronic)     Myofascial pain syndrome     Occasional tremors     Osteoporosis     Stage 4 chronic kidney disease (Multi) (Chronic)     Weakness generalized     Gout     Scoliosis of lumbar region due to degenerative disease of spine in adult     Depression     Lower extremity edema     Diabetes mellitus with complication (Multi)     COPD without exacerbation (Multi) (Chronic)     Primary osteoarthritis of right knee     Fall     Acute kidney failure, unspecified (CMS-HCC)     Muscle weakness (generalized)     Primary  osteoarthritis     Repeated falls     Paroxysmal atrial fibrillation (Multi)     Chronic diastolic (congestive) heart failure (Multi)     Chronic obstructive pulmonary disease, unspecified (Multi)     CKD (chronic kidney disease)     Normocytic anemia     Essential (primary) hypertension     HLD (hyperlipidemia)     Type 2 diabetes mellitus with hyperglycemia, without long-term current use of insulin (Multi)     Type 2 diabetes mellitus without complications (Multi)     Obesity (BMI 30-39.9)     Cellulitis of toe of right foot     Acute kidney injury (nontraumatic) (CMS-[facility])     ESRD (end stage renal disease) on dialysis (Multi) (Chronic)     Anemia due to chronic kidney disease, on chronic dialysis (Multi) (Chronic)     Thickening of wall of gallbladder     Allergy, unspecified, sequela     Anaphylactic shock, unspecified, sequela     Articular cartilage disorder of hip     Bone marrow disorder     Coagulation defect, unspecified (Multi)     Contact with and (suspected) exposure to covid-19     Fracture of bone of hip (Multi)     History of anemia     Hyperkalemia     Pain of lower extremity     Pain, unspecified     Plantar fasciitis     Pneumonia due to infectious organism     Chest pain     Acute pulmonary edema (Multi)     Elevated bilirubin     Atrial fibrillation with rapid ventricular response (Multi)                  Above medical problems may be reflective of historical medical problems that may have resolved and may not related to acute clinical condition/medical problems.     Clinical impression/plan:        Atrial Fibrillation with RVR   -Likely multifactorial in acute on chronic anemia, stress from surgical intervention, electrolyte derangements (hypomagnesemia has improved, hypophosphatemia on most recent labs), etc  -Overall patient feels weak, has continued abdominal discomfort from her surgery, and poor appetite; however, she is not endorsing any chest pain, palpitations, dizziness or  lightheadedness, diaphoresis, or symptoms consistent with her A-fib as she has had these since her postoperative period began  -Patient will be continued on a Cardizem drip (most recent echocardiogram without evidence of LV dysfunction or WMAs), can continue home medications as p.o. intake improves.  May utilize pushes of IV metoprolol as needed if HR remains elevated despite cardizem   -Continue to trend electrolytes closely, monitor on telemetry.  -Will check TSH in the morning as well out of abundance of caution  -Patient is not on oral anticoagulation for A-fib in setting of her chronic anemia and need for blood transfusions  -Cardiology and critical care also consulted on this patient     Acute on chronic anemia, multifactorial in setting of baseline ESRD, MDS, recent surgery  -Patient's baseline hemoglobin varies and she does frequently require PRBC infusions and Procrit; most recently she was in the low sevens  -Earlier today, the patient's hemoglobin was 6.6 --> she went for dialysis and the plan per the surgery team was to recheck her H&H and transfuse as needed at a later time (given that she does get MARILYNN/PRBCs with dialysis); however, with interval development of A-fib RVR she was transfused 1 unit of blood  -There has been no overt evidence of bleeding per patient or nursing, including hemoptysis, hematemesis, melena, hematochezia, etc.  -Will continue to trend H&H  -Transfusion goal as per nephrology and cardiology, but would recommend trying to maintain at least 7.0 pending their input     Chronic cholecystitis s/p laparoscopic fenestrated subtotal chidi (06/06/24), ongoing abdominal discomfort   -Patient is on a clear liquid diet but does not have much drive to have intake at this time due to ongoing abdominal discomfort  -She denies any specific nausea or vomiting  -She is utilizing incentive spirometry  -Majority of management including current antibiotics and nutrition as per primary service      HTN, HLD, CAD, Chronic HFpEF   -Blood pressure has been predominantly well-controlled throughout hospitalization  -As mentioned above, no current chest pain or anginal equivalents; is to be continued on home therapies  -Does not appear volume overloaded on examination currently, but does have some diminished breath sounds and CXR is showing evidence of some congestion.  Given diminished intake, will hold off on aggressive IV diuresis for therapies at this time, but moving forward may need to consider if volume status worsens versus adjusting volume status with HD  -Cardiology is following the patient as well     ESRD on HD  -Nephrology is following  -Continue home therapies as appropriate     COPD without acute exacerbation  -Patient is slightly diminished on auscultation, but not overtly wheezing and does have fair aeration  -Continue home therapies with caution in regard to albuterol usage with A-fib  -Continue with incentive spirometry, pulmonary hygiene, ambulation as tolerated     NIDDM-II   -Agree with mild SSI as appetite and intake improves  -Continue with hypoglycemic protocol, Accu-Cheks  -Monitor and adjust as needed     Anxiety and depression   -Continue home therapies      Electrolyte derangements: Hypomagnesemia, hypophosphatemia  -Hypomagnesemia: Patient did have a low mag level at 1.37; however, was replaced and did rise to 1.68.  With A-fib history, would start additional replacement with goal to be around the 2.0 range  -Hypophosphatemia: Phos 3.1 this morning, following dialysis and with repeat electrolytes did downtrend to 1.9.  Will order replacement and recheck. Note: as ordering, order for replacement was just added. Repeat labs to follow.      Disposition/additional care plan/interventions: 6/9/2024        Tana Hargrove is a 79 y.o. female with a significant past medical history s/f HTN, HLD, CAD, paroxysmal atrial fibrillation (not on AC d/t anemia and need for recurrent blood  transfusion), HFpEF,  NIDDM2, ESRD on HD, chronic anemia requiring Procrit and intermittent PRBC infusions (in setting of MDS and ESRD), OA, anxiety / depression, GERD, and chronic cholecystitis who presented 06/06/24 for laparoscopic fenestrated subtotal chidi I/s/o acute on chronic cholecystitis. Followed by surgery (primary) and nephrology (for HD). Medicine was consulted for perioperative medical management and assistance with management of atrial fibrillation with RVR, acute on chronic anemia. Cardiology  and Intensivist also consulted.     Continue current stewardship and postoperative care as per general surgery.     Patient cleared sinus rhythm overnight.     Continue renal replacement therapy as recommended by nephrology.     Continue to monitor H&H closely.     Reported COPD history deemed spurious by intensivist/pulmonologist: Outpatient follow-up with family physician pulmonary function testing may be of value to further evaluate diagnosis.     Electrolyte derangement improved continue to monitor magnesium.     Will continue rate control therapy for atrial fibrillation anticoagulation therapy recommended once okay with general surgery.      Disposition/additional care plan/interventions: 6/10/2024      BP slightly elevated at the time of evaluation likely transient in nature we will continue monitoring closely.     Physical therapy and Occupational Therapy as soon as okay with primary service/surgical service.     Continue to monitor for signs of bleeding, anticoagulation therapy needed currently on heparin drip.     Transition to oral Eliquis once okay with surgical service  Hospitalist service will continue to follow while inpatient    Disposition/additional care plan/interventions: 6/11/2024       Patient remained in sinus rhythm, H&H appears stable, no overt signs of acute bleeding appreciated.    Continue stewardship and recommendation as per culture.    Close follow-up with cardiology outpatient  recommended, patient warned.    Continue stewardship as per surgical/primary/admitting service.    Appreciate input and recommendation from other consultants.  Follow-up with hematology recommended.      The patient was informed of differential diagnosis , work up , plan of care and possible sequelae of clinical disposition.Patient in agreement with plan of care. Further recommendations forthcoming in accordance with patient's clinical disposition and response to care.     Discharge planning:Discharge time in accordance recommendations by general surgery     Care time: < 55 mins.              Dictation performed with assistance of voice recognition device therefore transcription errors are possible

## 2024-06-11 NOTE — PROGRESS NOTES
Tana Hargrove is a 79 y.o. female on day 5 of admission presenting with Chronic cholecystitis.    Subjective   Had BM this morning.  Denies pain.  Kyler diet.         Objective   Heart Rate:  []   Temp:  [36 °C (96.8 °F)-37 °C (98.6 °F)]   Resp:  [18]   BP: (122-155)/(55-72)   Weight:  [65.3 kg (144 lb)-67.7 kg (149 lb 4 oz)]   SpO2:  [86 %-99 %]   I/O last 3 completed shifts:  In: 674.8 (10 mL/kg) [I.V.:274.8 (4.1 mL/kg); IV Piggyback:400]  Out: 1390 (20.5 mL/kg) [Urine:1390 (0.6 mL/kg/hr)]  Weight: 67.7 kg   No intake/output data recorded.  Physical Exam  Vitals reviewed.   Pulmonary:      Effort: Pulmonary effort is normal.   Abdominal:      Palpations: Abdomen is soft.      Comments: Incisions c/d/I  Dressing on JANE exit site dry  NT   Skin:     General: Skin is warm.      Capillary Refill: Capillary refill takes less than 2 seconds.   Neurological:      General: No focal deficit present.      Mental Status: She is alert.   Psychiatric:         Mood and Affect: Mood normal.           Assessment/Plan   Principal Problem:    Chronic cholecystitis  Active Problems:    Chronic diastolic heart failure of unknown etiology (Multi)    Hypertension, essential    GERD (gastroesophageal reflux disease)    Hyperlipidemia    Myelodysplastic syndrome (Multi)    COPD without exacerbation (Multi)    ESRD (end stage renal disease) on dialysis (Multi)    Anemia due to chronic kidney disease, on chronic dialysis (Multi)    Atrial fibrillation with rapid ventricular response (Multi)  79F POD5 s/p lap subtotal fenestrated chidi doing well  - cont eliquis 2.5 bid  - dc planning    I spent 45 minutes in the professional and overall care of this patient.      Ronald Grimes MD

## 2024-06-11 NOTE — PROGRESS NOTES
PROGRESS NOTE    HPI:  Tana Hargrove is a 79 y.o. female with PMHx sig for Myelodysplastic syndrome with associated anemia, hypertension, dyslipidemia, diabetes mellitus, CKD.  COPD, ESRD on hemodialysis, type 2 diabetes mellitus, gout, neuropathy, chronic cholecystitis presented on 06/06/24 for laparoscopic fenestrated subtotal cholecystectomy. Patient is currently on hospital day #2 and being followed by surgery (primary), nephrology (for HD), and internal medicine. Earlier this evening, patient noted to have developed atrial fibrillation with RVR on telemetry with heart rate of 110s while on general medicine floor with telemetry. EKG confirmed atrial fibrillation and stat CBC, BMP, troponin, PRBC, and chest x-ray ordered. General surgery consulted cardiology who recommended transfer to ICU or stepdown and initiation of cardizem infusion. Critical care medicine was consulted for paroxysmal atrial fibrillation and s/p laparascopic cholecystectomy by surgery team. Patient seen and examined in ICU bed 1. Patient lying in bed with eyes closed and responsive to voice. She denies any chest pain, shortness of breath, cough, vision changes, nausea, palpitations, lightheadedness, or dizziness. She appears to be hemodynamically stable with blood pressure of 110/53, MAP 81, heart rate 85, respiratory rate 18, and SPO2 99% on 2.5 lpm of oxygen. She denies any complaints of     Subjective Data:  Patient is sitting up in bedside chair eating her breakfast.  Denies any cardiac complaints such as chest pain or shortness of breath.  Telemetry shows sinus rhythm at 65.  Patient with history of chronic anemia and hematology is on board to make final decision regarding anticoagulation therapy.  Hemoglobin today stable at 8.3.    Overnight Events:    None     Objective Data:  Last Recorded Vitals:  Vitals:    06/11/24 0500 06/11/24 0600 06/11/24 0704 06/11/24 0723   BP: 155/68 137/60     Pulse: 66 63     Resp:       Temp:   37 °C  (98.6 °F)    TempSrc:       SpO2: 99% 98%  98%   Weight:       Height:           Last Labs:  CBC - 6/11/2024:  4:09 AM  4.2 8.3 235    25.2      CMP - 6/11/2024:  4:09 AM  8.9 5.2 12 --- 0.5   3.2 2.9 15 81      PTT - 6/9/2024:  9:25 AM  1.3   14.2 32     Last I/O:  I/O last 3 completed shifts:  In: 674.8 (10 mL/kg) [I.V.:274.8 (4.1 mL/kg); IV Piggyback:400]  Out: 1390 (20.5 mL/kg) [Urine:1390 (0.6 mL/kg/hr)]  Weight: 67.7 kg     Past Cardiology Tests (Last 3 Years):  Echo: CONCLUSIONS:   1. Left ventricular systolic function is normal with a 60% estimated ejection fraction.   2. Moderately increased left ventricular septal thickness.   3. Spectral Doppler shows an impaired relaxation pattern of left ventricular diastolic filling.   4. The left atrium is moderately dilated.   5. Mild to moderate tricuspid regurgitation.   6. Mildly elevated RVSP.   7. Aortic valve stenosis is not present.    Inpatient Medications:  Scheduled medications   Medication Dose Route Frequency    acetaminophen  975 mg oral q8h    apixaban  2.5 mg oral q12h    ascorbic acid  500 mg oral Daily    carvedilol  25 mg oral BID    cholecalciferol  2,000 Units oral Daily    cyanocobalamin  1,000 mcg oral Daily    docusate sodium  100 mg oral BID    folic acid  1 mg oral Daily    heparin  2,000 Units intra-catheter After Dialysis    heparin  2,000 Units intra-catheter After Dialysis    insulin lispro  0-10 Units subcutaneous TID    losartan  50 mg oral Daily    multivitamin with minerals  1 tablet oral Daily    oxygen   inhalation Continuous - Inhalation    polyethylene glycol  17 g oral Daily    pravastatin  40 mg oral Nightly    pregabalin  100 mg oral BID    zinc sulfate  50 mg of elemental zinc oral Daily       Review of Systems   Constitutional: Negative for fever and malaise/fatigue.   Cardiovascular:  Negative for chest pain, orthopnea and palpitations.   Respiratory:  Negative for shortness of breath and wheezing.    Skin:  Negative for  itching and rash.   Gastrointestinal:  Positive for abdominal pain. Negative for diarrhea, nausea and vomiting.        No flatus or bowel movement yet   Genitourinary:  Negative for dysuria.   Neurological:  Negative for weakness.        Physical Exam  Constitutional:       General: She is not in acute distress.     Appearance: Normal appearance.   HENT:      Mouth/Throat:      Mouth: Mucous membranes are moist.   Neck:      Comments: No JVD  Cardiovascular:      Rate and Rhythm: Normal rate and regular rhythm.      Comments: Telemetry sinus rhythm at 65  Pulmonary:      Effort: Pulmonary effort is normal.      Breath sounds: Normal breath sounds.   Abdominal:      General: Bowel sounds are normal.      Palpations: Abdomen is soft.      Tenderness: There is abdominal tenderness.      Comments: Minimal tenderness more so over the right abdomen   Musculoskeletal:      Right lower leg: No edema.      Left lower leg: No edema.   Skin:     General: Skin is warm and dry.   Neurological:      Mental Status: She is alert and oriented to person, place, and time.   Psychiatric:         Behavior: Behavior is cooperative.        ASSESSMENT/PLAN  1) Atrial fib with RVR  Now in NSR  Increase Coreg  Needs anticoagulation when ok with Surgery (Start Eliquis 5mg BID)  Echo in 2/2024 with NSR  6-11-24: Patient remains sinus rhythm.  No cardiac complaints or concerns.  Again echo back in February showed normal LV systolic function.  Blood pressures are well-controlled.  Hematology on board and she has been started on low-dose Eliquis.  Hemoglobin stable this morning at 8.3.  No further cardiac testing.  Will arrange close outpatient follow-up.  Cardiology signing off at this time.      Abhi Montes PA-C  6/11/2024  8:36 AM

## 2024-06-11 NOTE — PROGRESS NOTES
Calorie Count/Follow up    6/11:  Pt sitting up, reports finished breakfast with fair PO intake.  Pt drinking Ensure supplement during visit, and reports likes supplement drinks.  PO intake has been improving.  Patient forgot to eat lunch yesterday, but had breakfast/Dinner.    Date/Time Started:  6/10/24    Date/Time Completed: 6/10/24    Calorie Count  Total Calories (kcals): 807 kcals  Total Protein (g): 20 g  Calorie Count Results: Patient encouraged to drink supplements to meet calorie needs, and consume 3 meals daily.  Calorie Count Interpretation: Inadequate to meet estimated nutritional needs, Inconclusive data available    Will continue to send supplements, encouraged increased PO, and don't skip meals.     Will follow per goals of care.

## 2024-06-12 LAB
GLUCOSE BLD MANUAL STRIP-MCNC: 145 MG/DL (ref 74–99)
GLUCOSE BLD MANUAL STRIP-MCNC: 151 MG/DL (ref 74–99)
GLUCOSE BLD MANUAL STRIP-MCNC: 188 MG/DL (ref 74–99)
GLUCOSE BLD MANUAL STRIP-MCNC: 239 MG/DL (ref 74–99)

## 2024-06-12 PROCEDURE — 1100000001 HC PRIVATE ROOM DAILY

## 2024-06-12 PROCEDURE — 97110 THERAPEUTIC EXERCISES: CPT | Mod: GP,CQ

## 2024-06-12 PROCEDURE — 97535 SELF CARE MNGMENT TRAINING: CPT | Mod: GO,CO

## 2024-06-12 PROCEDURE — 97110 THERAPEUTIC EXERCISES: CPT | Mod: GO,CO

## 2024-06-12 PROCEDURE — 99231 SBSQ HOSP IP/OBS SF/LOW 25: CPT | Performed by: INTERNAL MEDICINE

## 2024-06-12 PROCEDURE — 82947 ASSAY GLUCOSE BLOOD QUANT: CPT | Mod: 91

## 2024-06-12 PROCEDURE — 2500000001 HC RX 250 WO HCPCS SELF ADMINISTERED DRUGS (ALT 637 FOR MEDICARE OP): Performed by: SURGERY

## 2024-06-12 PROCEDURE — 99024 POSTOP FOLLOW-UP VISIT: CPT | Performed by: SURGERY

## 2024-06-12 PROCEDURE — 99232 SBSQ HOSP IP/OBS MODERATE 35: CPT | Performed by: HOSPITALIST

## 2024-06-12 PROCEDURE — 97530 THERAPEUTIC ACTIVITIES: CPT | Mod: GP,CQ

## 2024-06-12 RX ORDER — ACETAMINOPHEN 325 MG/1
650 TABLET ORAL EVERY 6 HOURS PRN
Status: DISCONTINUED | OUTPATIENT
Start: 2024-06-12 | End: 2024-06-13 | Stop reason: HOSPADM

## 2024-06-12 RX ORDER — ACETAMINOPHEN 325 MG/1
650 TABLET ORAL EVERY 6 HOURS PRN
Qty: 30 TABLET | Refills: 0 | Status: CANCELLED | OUTPATIENT
Start: 2024-06-12

## 2024-06-12 ASSESSMENT — COGNITIVE AND FUNCTIONAL STATUS - GENERAL
PERSONAL GROOMING: A LITTLE
MOBILITY SCORE: 15
HELP NEEDED FOR BATHING: A LITTLE
PERSONAL GROOMING: A LITTLE
MOVING FROM LYING ON BACK TO SITTING ON SIDE OF FLAT BED WITH BEDRAILS: A LITTLE
DRESSING REGULAR LOWER BODY CLOTHING: A LOT
TOILETING: A LITTLE
EATING MEALS: A LITTLE
MOBILITY SCORE: 15
HELP NEEDED FOR BATHING: A LITTLE
WALKING IN HOSPITAL ROOM: A LOT
TURNING FROM BACK TO SIDE WHILE IN FLAT BAD: A LITTLE
HELP NEEDED FOR BATHING: A LITTLE
EATING MEALS: A LITTLE
WALKING IN HOSPITAL ROOM: A LOT
DAILY ACTIVITIY SCORE: 17
MOBILITY SCORE: 15
CLIMB 3 TO 5 STEPS WITH RAILING: TOTAL
CLIMB 3 TO 5 STEPS WITH RAILING: TOTAL
STANDING UP FROM CHAIR USING ARMS: A LITTLE
MOVING TO AND FROM BED TO CHAIR: A LITTLE
MOVING TO AND FROM BED TO CHAIR: A LITTLE
TOILETING: A LITTLE
DRESSING REGULAR LOWER BODY CLOTHING: A LOT
MOVING FROM LYING ON BACK TO SITTING ON SIDE OF FLAT BED WITH BEDRAILS: A LITTLE
MOVING TO AND FROM BED TO CHAIR: A LITTLE
WALKING IN HOSPITAL ROOM: A LOT
DRESSING REGULAR LOWER BODY CLOTHING: A LOT
DAILY ACTIVITIY SCORE: 17
CLIMB 3 TO 5 STEPS WITH RAILING: TOTAL
TURNING FROM BACK TO SIDE WHILE IN FLAT BAD: A LITTLE
DRESSING REGULAR UPPER BODY CLOTHING: A LITTLE
TOILETING: A LITTLE
DRESSING REGULAR UPPER BODY CLOTHING: A LITTLE
EATING MEALS: A LITTLE
TURNING FROM BACK TO SIDE WHILE IN FLAT BAD: A LITTLE
DRESSING REGULAR UPPER BODY CLOTHING: A LITTLE
PERSONAL GROOMING: A LITTLE
DAILY ACTIVITIY SCORE: 17
MOVING FROM LYING ON BACK TO SITTING ON SIDE OF FLAT BED WITH BEDRAILS: A LITTLE

## 2024-06-12 ASSESSMENT — ENCOUNTER SYMPTOMS
LIGHT-HEADEDNESS: 0
DYSURIA: 0
ABDOMINAL PAIN: 0
EYE DISCHARGE: 0
SHORTNESS OF BREATH: 0
COLOR CHANGE: 0
SORE THROAT: 0
EYE REDNESS: 0
DIARRHEA: 0
BACK PAIN: 0
HEADACHES: 0
COUGH: 0
NAUSEA: 0
CHILLS: 0
VOMITING: 0
FREQUENCY: 0
CONSTIPATION: 0
ARTHRALGIAS: 0
FEVER: 0

## 2024-06-12 ASSESSMENT — PAIN - FUNCTIONAL ASSESSMENT
PAIN_FUNCTIONAL_ASSESSMENT: 0-10

## 2024-06-12 ASSESSMENT — PAIN SCALES - GENERAL
PAINLEVEL_OUTOF10: 0 - NO PAIN

## 2024-06-12 ASSESSMENT — ACTIVITIES OF DAILY LIVING (ADL): HOME_MANAGEMENT_TIME_ENTRY: 10

## 2024-06-12 NOTE — PROGRESS NOTES
Patient's auth is still pending for patient to return to [facility]. Anticipate auth back in the next 24 hours. TCC following.

## 2024-06-12 NOTE — PROGRESS NOTES
Tana Hargrove [id number]   Service: Internal Medicine / Hospitalist Date of service: 6/12/2024                                  Full Code                           Subjective       History Of Present Illness:    Tana Hargrove is a 79 y.o. female with a significant past medical history of HTN, HLD, CAD, paroxysmal atrial fibrillation (not on AC d/t anemia and need for recurrent blood transfusion), HFpEF, COPD (no baseline O2), NIDDM2, ESRD on HD, chronic anemia requiring Procrit and intermittent PRBC infusions (in setting of MDS and ESRD), OA, anxiety / depression, GERD, and chronic cholecystitis who presented 06/06/24 for laparoscopic fenestrated subtotal chidi I/s/o acute on chronic cholecystitis. Patient current on Hospital Day #2, being followed by surgery (primary) and nephrology (for HD). Medicine was consulted for perioperative medical management and assistance with management of atrial fibrillation with RVR, acute on chronic anemia. Cardiology also consulted on HD #2.     Patient seen and examined awaiting transfer from  --> ICU in setting of AF RVR, acute on chronic anemia.  Patient reports that she has felt generally weak, rundown, and has had overall very poor appetite and intake following her surgery.  She also admits to an ongoing degree of mild abdominal pain and discomfort which contributes to her not wanting to take in as much.  Very mild nausea, no emesis.  Since her operation, she has been on oxygen which she does not normally wear at home, but it has been slowly decreasing in the amount she needs and she does not report feeling acutely short of breath or winded.  She denies any chest pain or pressure, palpitations, dizziness or lightheadedness, diaphoresis, headache, vision changes, focal or lateralizing sensorimotor deficits.  She did undergo dialysis today, and felt that that went well.  Overall she feels a bit discouraged regarding how she feels right now.        6/9................No  reported : palpitations, headaches, syncope, chest pains, nausea or vomiting.Patient endorsed mild abdominal discomfort but voiced no other complaints.     6/10.....................Patient seen post JANE drain removal.  She complains of follow-up post removal related pain and discomfort but overall doing well.  No reported headaches, chest pains, palpitations, nausea or vomiting.     6/11.................No acute complaints voiced by patient today.  No reported palpitations, headaches, chest pains, fevers, nausea or vomiting.  Patient reported that she had physical therapy today.     6/12.................No reported :chest pains, dyspnea, palpitations, epistaxis, melena, hematochezia or other overt signs of bleeding.      Review of Systems:   Review of system otherwise negative if not aforementioned above in subjective.     Objective        Physical Exam      Constitutional:       Appearance: Patient appeared in no acute cardiopulmonary distress.     Comments: Patient alert and oriented to person place time and situation.Patient appeared nontoxic, up in chair .  HEENT:      Head: Normocephalic and atraumatic.Trachea midline      Nose:No observed congestion or rhinorrhea.     Mouth/Throat: Mucous membranes Moist, Trachea appeared  midline.  Eyes:      Extraocular Movements: Extraocular movements intact.      Pupils: Pupils are equal, round, and reactive to light.      Comments: No scleral icterus or conjunctival injection appreciated.   Cardiovascular:      Rate and Rhythm: Normal rate and regular rhythm. No clicks rubs or gallops, normal S1 and S2.No peripheral stigmata of endocarditis appreciated.     Pulmonary:      Lungs appeared clear to auscultation, no adventitious sound appreciated.  Abdominal:      General: Abdomen soft, nondistended active bowel sounds, no involuntary guarding or rebound tenderness appreciated.     Comments: Exam limited by seated position.  Musculoskeletal:       Patient appeared to have  full active range of motion for upper and lower extremities, no acute apparent joint deformity appreciated on examination.   No pitting edema or cyanosis appreciated.       Lymphadenopathy:      No appreciable palpable lymphadenopathy  Skin:     General: Skin is warm.      Coloration:  No jaundice     Findings: No abnormal appearing skin rashes or lesions that appeared acute noted on unclothed area of the skin..   Neurological:      General: No focal sensory or motor deficits appreciated, no meningeal signs or dysmetria noted.      Cranial Nerves: Cranial nerves II to XII appearing grossly intact.     Genitals:  Deferred  Psychiatric:         The patient appears to be displaying normal mood and affect at the time of evaluation.     Labs:               Lab Results   Component Value Date     GLUCOSE 112 (H) 06/09/2024     CALCIUM 8.5 (L) 06/09/2024      06/09/2024     K 4.0 06/09/2024     CO2 30 06/09/2024      06/09/2024     BUN 17 06/09/2024     CREATININE 1.59 (H) 06/09/2024                          Lab Results   Component Value Date     GLUCOSE 115 (H) 06/10/2024     CALCIUM 8.6 06/10/2024      (L) 06/10/2024     K 3.7 06/10/2024     CO2 28 06/10/2024      06/10/2024     BUN 25 (H) 06/10/2024     CREATININE 2.02 (H) 06/10/2024                                                                    [unfilled]   [unfilled]   Susceptibility data from last 90 days.  Collected Specimen Info Organism Amoxicillin/Clavulanate Ampicillin Ampicillin/Sulbactam Cefazolin Cefazolin (uncomplicated UTIs only) Ciprofloxacin Gentamicin Levofloxacin Nitrofurantoin Piperacillin/Tazobactam   05/20/24 Blood culture from Peripheral Venipuncture Klebsiella pneumoniae/variicola  S  R  S  S    I  S  S    S   04/27/24 Fluid from Aspirate Klebsiella pneumoniae/variicola S R S S   I S S   S   04/14/24 Urine from Clean Catch/Voided Escherichia coli S R I S S S S   S S      Collected Specimen Info Organism  Trimethoprim/Sulfamethoxazole   05/20/24 Blood culture from Peripheral Venipuncture Klebsiella pneumoniae/variicola  S   04/27/24 Fluid from Aspirate Klebsiella pneumoniae/variicola S   04/14/24 Urine from Clean Catch/Voided Escherichia coli S                   X-rays/ Images     [unfilled]   Radiology Results (last 21 days)     Procedure Component Value Units Date/Time   XR chest 1 view [[id number]] Collected: 06/08/24 1816   Order Status: Completed Updated: 06/08/24 1816   Narrative:     Interpreted By:  Josefa Reyna,  STUDY:  XR CHEST 1 VIEW; ;  6/8/2024 4:44 pm      INDICATION:  Signs/Symptoms:Afib, POD2 s/p lap chidi.      COMPARISON:  05/19/2024      ACCESSION NUMBER(S):  [id number]      ORDERING CLINICIAN:  ANNIE HENRY      TECHNIQUE:  Portable chest      FINDINGS:  Tunneled right chest dual lumen catheter unchanged, distal tip  projects in the right atrium. Right hemidiaphragm elevation is again  noted. Patchy left basal consolidation is increased. Increased  perihilar interstitial opacities. Probable small pleural effusions.  No pneumothorax. Unchanged cardiomediastinal silhouette. No acute  osseous finding.       Impression:     Probable interstitial edema/venous congestion and small pleural  effusions. Increased left basal opacities may be atelectasis or  pneumonia.          Signed by: Josefa Reyna 6/8/2024 6:15 PM  Dictation workstation:   [id number]   US gallbladder [[id number]] Collected: 05/19/24 1643   Order Status: Completed Updated: 05/19/24 1738   Narrative:     STUDY:  Right upper quadrant Ultrasound; 5/19/2024 4:28 PM  INDICATION:  Status post cholecystostomy, elevated LFTs.  COMPARISON:  CT A&P today, US gallbladder 4/18/2024, MR abdomen 4/18/2024.  ACCESSION NUMBER(S):  [id number]  ORDERING CLINICIAN:  CELIA WYNNE  TECHNIQUE:  Ultrasound of the right upper quadrant.  Multiple images of the right  upper quadrant were obtained.  FINDINGS:  The liver demonstrates normal echogenicity.        The gallbladder contains sludge as well as multiple stones.  There is  edematous wall thickening.  The cholecystectomy tube is visualized  within the gallbladder.  Sonographic Luna's sign is negative.       The common bile duct measures 0.3 cm.  There is no intrahepatic  biliary dilatation.       The pancreas is not well seen due to overlying bowel gas.     The right kidney measures 9.8 cm in length.  Renal cortical thinning  is present and echotexture is increased.  There is no hydronephrosis.  There are no stones.  There is a simple cyst measuring 1.2 x 1.0 x 0.8  cm within the midportion.   Impression:     Sludge and stones within the gallbladder with edematous wall  thickening.  No biliary dilatation is appreciated.  Increased echogenicity and thinning of the right renal cortex  consistent with intrinsic renal disease.  No hydronephrosis.  Signed by Lonnie Rodriguez MD                  Medical Problems         Problem List         * (Principal) Chronic cholecystitis     Lumbago     A-fib (Multi)     Anxiety     Chronic diastolic heart failure of unknown etiology (Multi) (Chronic)     Cervical spondylosis without myelopathy     Coronary artery disease     Degeneration of intervertebral disc of lumbar region     Hypertension, essential (Chronic)     Frequent falls     GERD (gastroesophageal reflux disease) (Chronic)     Hyperlipidemia (Chronic)     Inability to ambulate due to multiple joints     Jerking movements of extremities     Spinal stenosis of lumbar region     Lumbar spondylosis     Macrocytic anemia     Myelodysplastic syndrome (Multi) (Chronic)     Myofascial pain syndrome     Occasional tremors     Osteoporosis     Stage 4 chronic kidney disease (Multi) (Chronic)     Weakness generalized     Gout     Scoliosis of lumbar region due to degenerative disease of spine in adult     Depression     Lower extremity edema     Diabetes mellitus with complication (Multi)     COPD without exacerbation (Multi)  (Chronic)     Primary osteoarthritis of right knee     Fall     Acute kidney failure, unspecified (CMS-[facility])     Muscle weakness (generalized)     Primary osteoarthritis     Repeated falls     Paroxysmal atrial fibrillation (Multi)     Chronic diastolic (congestive) heart failure (Multi)     Chronic obstructive pulmonary disease, unspecified (Multi)     CKD (chronic kidney disease)     Normocytic anemia     Essential (primary) hypertension     HLD (hyperlipidemia)     Type 2 diabetes mellitus with hyperglycemia, without long-term current use of insulin (Multi)     Type 2 diabetes mellitus without complications (Multi)     Obesity (BMI 30-39.9)     Cellulitis of toe of right foot     Acute kidney injury (nontraumatic) (CMS-HCC)     ESRD (end stage renal disease) on dialysis (Multi) (Chronic)     Anemia due to chronic kidney disease, on chronic dialysis (Multi) (Chronic)     Thickening of wall of gallbladder     Allergy, unspecified, sequela     Anaphylactic shock, unspecified, sequela     Articular cartilage disorder of hip     Bone marrow disorder     Coagulation defect, unspecified (Multi)     Contact with and (suspected) exposure to covid-19     Fracture of bone of hip (Multi)     History of anemia     Hyperkalemia     Pain of lower extremity     Pain, unspecified     Plantar fasciitis     Pneumonia due to infectious organism     Chest pain     Acute pulmonary edema (Multi)     Elevated bilirubin     Atrial fibrillation with rapid ventricular response (Multi)                  Above medical problems may be reflective of historical medical problems that may have resolved and may not related to acute clinical condition/medical problems.     Clinical impression/plan:        Atrial Fibrillation with RVR   -Likely multifactorial in acute on chronic anemia, stress from surgical intervention, electrolyte derangements (hypomagnesemia has improved, hypophosphatemia on most recent labs), etc  -Overall patient feels weak, has  continued abdominal discomfort from her surgery, and poor appetite; however, she is not endorsing any chest pain, palpitations, dizziness or lightheadedness, diaphoresis, or symptoms consistent with her A-fib as she has had these since her postoperative period began  -Patient will be continued on a Cardizem drip (most recent echocardiogram without evidence of LV dysfunction or WMAs), can continue home medications as p.o. intake improves.  May utilize pushes of IV metoprolol as needed if HR remains elevated despite cardizem   -Continue to trend electrolytes closely, monitor on telemetry.  -Will check TSH in the morning as well out of abundance of caution  -Patient is not on oral anticoagulation for A-fib in setting of her chronic anemia and need for blood transfusions  -Cardiology and critical care also consulted on this patient     Acute on chronic anemia, multifactorial in setting of baseline ESRD, MDS, recent surgery  -Patient's baseline hemoglobin varies and she does frequently require PRBC infusions and Procrit; most recently she was in the low sevens  -Earlier today, the patient's hemoglobin was 6.6 --> she went for dialysis and the plan per the surgery team was to recheck her H&H and transfuse as needed at a later time (given that she does get MARILYNN/PRBCs with dialysis); however, with interval development of A-fib RVR she was transfused 1 unit of blood  -There has been no overt evidence of bleeding per patient or nursing, including hemoptysis, hematemesis, melena, hematochezia, etc.  -Will continue to trend H&H  -Transfusion goal as per nephrology and cardiology, but would recommend trying to maintain at least 7.0 pending their input     Chronic cholecystitis s/p laparoscopic fenestrated subtotal chidi (06/06/24), ongoing abdominal discomfort   -Patient is on a clear liquid diet but does not have much drive to have intake at this time due to ongoing abdominal discomfort  -She denies any specific nausea or  vomiting  -She is utilizing incentive spirometry  -Majority of management including current antibiotics and nutrition as per primary service     HTN, HLD, CAD, Chronic HFpEF   -Blood pressure has been predominantly well-controlled throughout hospitalization  -As mentioned above, no current chest pain or anginal equivalents; is to be continued on home therapies  -Does not appear volume overloaded on examination currently, but does have some diminished breath sounds and CXR is showing evidence of some congestion.  Given diminished intake, will hold off on aggressive IV diuresis for therapies at this time, but moving forward may need to consider if volume status worsens versus adjusting volume status with HD  -Cardiology is following the patient as well     ESRD on HD  -Nephrology is following  -Continue home therapies as appropriate     COPD without acute exacerbation  -Patient is slightly diminished on auscultation, but not overtly wheezing and does have fair aeration  -Continue home therapies with caution in regard to albuterol usage with A-fib  -Continue with incentive spirometry, pulmonary hygiene, ambulation as tolerated     NIDDM-II   -Agree with mild SSI as appetite and intake improves  -Continue with hypoglycemic protocol, Accu-Cheks  -Monitor and adjust as needed     Anxiety and depression   -Continue home therapies      Electrolyte derangements: Hypomagnesemia, hypophosphatemia  -Hypomagnesemia: Patient did have a low mag level at 1.37; however, was replaced and did rise to 1.68.  With A-fib history, would start additional replacement with goal to be around the 2.0 range  -Hypophosphatemia: Phos 3.1 this morning, following dialysis and with repeat electrolytes did downtrend to 1.9.  Will order replacement and recheck. Note: as ordering, order for replacement was just added. Repeat labs to follow.      Disposition/additional care plan/interventions: 6/9/2024        Tana Hargrove is a 79 y.o. female with a  significant past medical history s/f HTN, HLD, CAD, paroxysmal atrial fibrillation (not on AC d/t anemia and need for recurrent blood transfusion), HFpEF,  NIDDM2, ESRD on HD, chronic anemia requiring Procrit and intermittent PRBC infusions (in setting of MDS and ESRD), OA, anxiety / depression, GERD, and chronic cholecystitis who presented 06/06/24 for laparoscopic fenestrated subtotal chidi I/s/o acute on chronic cholecystitis. Followed by surgery (primary) and nephrology (for HD). Medicine was consulted for perioperative medical management and assistance with management of atrial fibrillation with RVR, acute on chronic anemia. Cardiology  and Intensivist also consulted.     Continue current stewardship and postoperative care as per general surgery.     Patient cleared sinus rhythm overnight.     Continue renal replacement therapy as recommended by nephrology.     Continue to monitor H&H closely.     Reported COPD history deemed spurious by intensivist/pulmonologist: Outpatient follow-up with family physician pulmonary function testing may be of value to further evaluate diagnosis.     Electrolyte derangement improved continue to monitor magnesium.     Will continue rate control therapy for atrial fibrillation anticoagulation therapy recommended once okay with general surgery.      Disposition/additional care plan/interventions: 6/10/2024      BP slightly elevated at the time of evaluation likely transient in nature we will continue monitoring closely.     Physical therapy and Occupational Therapy as soon as okay with primary service/surgical service.     Continue to monitor for signs of bleeding, anticoagulation therapy needed currently on heparin drip.     Transition to oral Eliquis once okay with surgical service  Hospitalist service will continue to follow while inpatient     Disposition/additional care plan/interventions: 6/12/2024     Blood pressure elevated this a.m., repeat BP post schedule a.m.  antihypertensive therapy.    Consider escalating losartan dosage if BP continues to be elevated.    Renal replacement therapy as scheduled.     Patient remained in sinus rhythm, H&H appears stable, no overt signs of acute bleeding appreciated.     Continue stewardship and recommendation as per Cardiology     Close follow-up with cardiology outpatient recommended, patient warned.     Continue stewardship as per surgical/primary/admitting service.     Appreciate input and recommendation from other consultants.  Follow-up with hematology recommended.     Patient continue anticoagulation therapy, check CBC in the morning to monitor H&H.     The patient was informed of differential diagnosis , work up , plan of care and possible sequelae of clinical disposition.Patient in agreement with plan of care. Further recommendations forthcoming in accordance with patient's clinical disposition and response to care.     Discharge planning:Discharge time in accordance recommendations by general surgery     Care time: < 55 mins.              Dictation performed with assistance of voice recognition device therefore transcription errors are possible

## 2024-06-12 NOTE — PROGRESS NOTES
"Tana Hargrove is a 79 y.o. female on day 6 of admission presenting with Chronic cholecystitis.  Chronic anemia history of myelodysplastic syndrome, coronary artery disease    Subjective   Patient still complaining of weakness and fatigue clinically improving       Objective     Physical Exam  HEENT examination normal limits     Neck is supple no cervical lymphadenopathy     Lung examination did show good air movement on the both side     Heart with normal regular rhythm     Abdomen is soft, nontender no hepatosplenomegaly, normotonic bowel sound     Lymphatic no peripheral lymphadenopathy     Extremity no edema cyanosis  Last Recorded Vitals  Blood pressure 156/73, pulse 75, temperature 37.3 °C (99.2 °F), resp. rate 18, height 1.549 m (5' 0.98\"), weight 60.6 kg (133 lb 9.6 oz), SpO2 91%.  Intake/Output last 3 Shifts:  I/O last 3 completed shifts:  In: 1270 (21 mL/kg) [P.O.:50; I.V.:820 (13.5 mL/kg); Other:400]  Out: 2953 (48.7 mL/kg) [Urine:553 (0.3 mL/kg/hr); Other:2400]  Weight: 60.6 kg     Relevant Results   Latest Reference Range & Units 06/11/24 04:09   LEUKOCYTES (10*3/UL) IN BLOOD BY AUTOMATED COUNT, [place] 4.4 - 11.3 x10*3/uL 4.2 (L)   nRBC 0.0 - 0.0 /100 WBCs 1.0 (H)   ERYTHROCYTES (10*6/UL) IN BLOOD BY AUTOMATED COUNT, [place] 4.00 - 5.20 x10*6/uL 2.80 (L)   HEMOGLOBIN 12.0 - 16.0 g/dL 8.3 (L)   HEMATOCRIT 36.0 - 46.0 % 25.2 (L)   MCV 80 - 100 fL 90   MCH 26.0 - 34.0 pg 29.6   MCHC 32.0 - 36.0 g/dL 32.9   RED CELL DISTRIBUTION WIDTH 11.5 - 14.5 % 20.8 (H)   PLATELETS (10*3/UL) IN BLOOD AUTOMATED COUNT, [place] 150 - 450 x10*3/uL 235   (L): Data is abnormally low  (H): Data is abnormally high             Assessment/Plan    #1 chronic anemia due to myelodysplastic syndrome and ESRD     2.  ESRD on hemodialysis     3.  Atrial fibrillation     4.  History of chronic cholecystitis status postcholecystectomy, hypertension.  Continue current supportive care monitor CBC  I spent 20 minutes in the professional " and overall care of this patient.      Azeem Medina MD

## 2024-06-12 NOTE — PROGRESS NOTES
Occupational Therapy    OT Treatment    Patient Name: Tana Hargrove  MRN: [id number]  Today's Date: 6/12/2024  Time Calculation  Start Time: 0952  Stop Time: 1021  Time Calculation (min): 29 min         Assessment:  End of Session Communication: Bedside nurse  End of Session Patient Position: Up in chair, Alarm on     Plan:  Treatment Interventions: ADL retraining, Functional transfer training, UE strengthening/ROM, Endurance training  OT Frequency: 4 times per week  OT Discharge Recommendations: Moderate intensity level of continued care  OT Recommended Transfer Status: Assist of 2  OT - OK to Discharge: Yes ((when medically approp.))  Treatment Interventions: ADL retraining, Functional transfer training, UE strengthening/ROM, Endurance training    Subjective   Previous Visit Info:  OT Last Visit  OT Received On: 06/12/24  General:  General  Prior to Session Communication: Bedside nurse  Patient Position Received: Bed, 3 rail up, Alarm on  Preferred Learning Style: verbal  General Comment: pt alert and agreeable to OT tx session  Precautions:     Vital Signs:     Pain:       Objective    Cognition:  Cognition  Overall Cognitive Status: Within Functional Limits  Orientation Level: Oriented X4  Coordination:     Activities of Daily Living: Grooming  Grooming Level of Assistance: Setup, Distant supervision  Grooming Where Assessed: Edge of bed, Chair  Grooming Comments: face washing and hair brushing task completed  Functional Standing Tolerance:  Time: less than 1 min standing at EOB prior to t/f to chair, complete stand at chair less than 1 min  Activity: [place] and no LOB noted  Functional Standing Tolerance Comments: tolerated standing well this date  Bed Mobility/Transfers: Bed Mobility  Bed Mobility: Yes  Bed Mobility 1  Bed Mobility 1: Supine to sitting  Level of Assistance 1: Minimum assistance, Minimal verbal cues    Transfers  Transfer: Yes  Transfer 1  Transfer From 1: Bed to  Transfer to 1: Stand  Technique  1: Sit to stand  Transfer Device 1: Walker  Transfer Level of Assistance 1: Minimum assistance, Minimal verbal cues  Transfers 2  Transfer From 2: Bed to  Transfer to 2: Stand, Chair with arms  Technique 2: Stand pivot  Transfer Device 2: Walker  Transfer Level of Assistance 2: Minimal verbal cues, Minimum assistance  Trials/Comments 2: good safety awareness and carryover with hand placement and technique w/o cueing    Sitting Balance:  Static Sitting Balance  Static Sitting-Balance Support: Feet supported, Bilateral upper extremity supported  Static Sitting-Level of Assistance: Close supervision  Dynamic Sitting Balance  Dynamic Sitting-Balance Support: Feet supported  Dynamic Sitting-Comments: close supv  Standing Balance:  Static Standing Balance  Static Standing-Balance Support: Bilateral upper extremity supported  Static Standing-Level of Assistance: Minimum assistance       Therapy/Activity: Therapeutic Exercise  Therapeutic Exercise Performed: Yes  Therapeutic Exercise Activity 1: 1x10 bicep curls  Therapeutic Exercise Activity 2: 1x10 chest pulls  Therapeutic Exercise Activity 3: 1x10 tricep extensions        Outcome Measures:[facility] Daily Activity  Putting on and taking off regular lower body clothing: A lot  Bathing (including washing, rinsing, drying): A little  Putting on and taking off regular upper body clothing: A little  Toileting, which includes using toilet, bedpan or urinal: A little  Taking care of personal grooming such as brushing teeth: A little  Eating Meals: A little  Daily Activity - Total Score: 17        Education Documentation  Body Mechanics, taught by PEDRO Navarro at 6/12/2024 10:38 AM.  Learner: Patient  Readiness: Acceptance  Method: Explanation  Response: Verbalizes Understanding    Education Comments  No comments found.        OP EDUCATION:       Goals:  Encounter Problems       Encounter Problems (Active)       ADLs       Demo. grooming, brushing teeth, washing face while  seated EOB with SBA. (Progressing)       Start:  06/08/24    Expected End:  06/22/24               BALANCE       Kyler. 1 min. static stand with FWW with Min. A.  (Progressing)       Start:  06/08/24    Expected End:  06/22/24               EXERCISE/STRENGTHENING       Kyler. UE ex. all planes min. resist. 10 reps.with min. fatigue.  (Progressing)       Start:  06/08/24    Expected End:  06/22/24

## 2024-06-12 NOTE — CARE PLAN
The patient's goals for the shift include      Problem: Skin  Goal: Decreased wound size/increased tissue granulation at next dressing change  Outcome: Progressing  Flowsheets (Taken 6/12/2024 1135)  Decreased wound size/increased tissue granulation at next dressing change: Promote sleep for wound healing  Goal: Promote/optimize nutrition  Outcome: Progressing  Flowsheets (Taken 6/12/2024 1135)  Promote/optimize nutrition:   Consume > 50% meals/supplements   Offer water/supplements/favorite foods  Goal: Promote skin healing  Outcome: Progressing  Flowsheets (Taken 6/12/2024 1135)  Promote skin healing: Turn/reposition every 2 hours/use positioning/transfer devices     Problem: Pain  Goal: Takes deep breaths with improved pain control throughout the shift  Outcome: Progressing  Goal: Turns in bed with improved pain control throughout the shift  Outcome: Progressing  Goal: Walks with improved pain control throughout the shift  Outcome: Progressing  Goal: Performs ADL's with improved pain control throughout shift  Outcome: Progressing  Goal: Participates in PT with improved pain control throughout the shift  Outcome: Progressing  Goal: Free from opioid side effects throughout the shift  Outcome: Progressing  Goal: Free from acute confusion related to pain meds throughout the shift  Outcome: Progressing     Problem: Respiratory  Goal: Clear secretions with interventions this shift  Outcome: Progressing  Goal: Minimize anxiety/maximize coping throughout shift  Outcome: Progressing  Goal: Minimal/no exertional discomfort or dyspnea this shift  Outcome: Progressing  Goal: No signs of respiratory distress (eg. Use of accessory muscles. Peds grunting)  Outcome: Progressing  Goal: Patent airway maintained this shift  Outcome: Progressing  Goal: Tolerate mechanical ventilation evidenced by VS/agitation level this shift  Outcome: Progressing  Goal: Verbalize decreased shortness of breath this shift  Outcome: Progressing  Goal:  Increase self care and/or family involvement in next 24 hours  Outcome: Progressing     Problem: Pain - Adult  Goal: Verbalizes/displays adequate comfort level or baseline comfort level  Outcome: Progressing     Problem: Discharge Planning  Goal: Discharge to home or other facility with appropriate resources  Outcome: Progressing     Problem: Chronic Conditions and Co-morbidities  Goal: Patient's chronic conditions and co-morbidity symptoms are monitored and maintained or improved  Outcome: Progressing     Problem: Diabetes  Goal: Achieve decreasing blood glucose levels by end of shift  Outcome: Progressing  Goal: Increase stability of blood glucose readings by end of shift  Outcome: Progressing  Goal: Maintain electrolyte levels within acceptable range throughout shift  Outcome: Progressing  Goal: Maintain glucose levels >70mg/dl to <250mg/dl throughout shift  Outcome: Progressing  Goal: No changes in neurological exam by end of shift  Outcome: Progressing  Goal: Learn about and adhere to nutrition recommendations by end of shift  Outcome: Progressing  Goal: Vital signs within normal range for age by end of shift  Outcome: Progressing  Goal: Increase self care and/or family involovement by end of shift  Outcome: Progressing  Goal: Receive DSME education by end of shift  Outcome: Progressing     Problem: Nutrition  Goal: Less than 5 days NPO/clear liquids  Outcome: Progressing  Goal: Consume prescribed supplement  Outcome: Progressing  Goal: BG  mg/dL  Outcome: Progressing  Goal: Promote healing  Outcome: Progressing  Goal: Maintain stable weight  Outcome: Progressing     Problem: Heart Failure  Goal: Improved gas exchange this shift  Outcome: Progressing  Goal: Improved urinary output this shift  Outcome: Progressing  Goal: Reduction in peripheral edema within 24 hours  Outcome: Progressing  Goal: Report improvement of dyspnea/breathlessness this shift  Outcome: Progressing  Goal: Weight from fluid excess  reduced over 2-3 days, then stabilize  Outcome: Progressing  Goal: Increase self care and/or family involvement in 24 hours  Outcome: Progressing     Problem: Pain  Goal: My pain/discomfort is manageable  Outcome: Progressing     Problem: Safety  Goal: I will remain free of falls  Outcome: Progressing     Problem: Daily Care  Goal: Daily care needs are met  Outcome: Progressing     Problem: Psychosocial Needs  Goal: Demonstrates ability to cope with hospitalization/illness  Outcome: Progressing  Goal: Collaborate with me, my family, and caregiver to identify my specific goals  Outcome: Progressing     Problem: Discharge Barriers  Goal: My discharge needs are met  Outcome: Progressing     Problem: Fall/Injury  Goal: Not fall by end of shift  Outcome: Progressing  Goal: Be free from injury by end of the shift  Outcome: Progressing  Goal: Use assistive devices by end of the shift  Outcome: Progressing  Goal: Pace activities to prevent fatigue by end of the shift  Outcome: Progressing       The clinical goals for the shift include blood sugar will remain within range that does not require sliding scale insulin through end of shift.    See assessment and mar. Remains on room air. See blood sugars. Tele as ordered. Up to chair at this time.

## 2024-06-12 NOTE — PROGRESS NOTES
Nephrology Progress Note    Following for ESRD.  Patient has no complaints today.  She denies chest pain, shortness of breath, or nausea.  There is no abdominal pain.  Appetite is better.        Current Inpatient Medications:  Current Facility-Administered Medications Ordered in Epic   Medication Dose Route Frequency Provider Last Rate Last Admin    acetaminophen (Tylenol) tablet 650 mg  650 mg oral q6h PRN Ronald Grimes MD        apixaban (Eliquis) tablet 2.5 mg  2.5 mg oral q12h Ronald Grimes MD   2.5 mg at 06/11/24 2203    ascorbic acid (Vitamin C) tablet 500 mg  500 mg oral Daily Ronald Grimes MD   500 mg at 06/11/24 0808    bisacodyl (Dulcolax) suppository 10 mg  10 mg rectal Daily PRN Ronald Grimes MD        carvedilol (Coreg) tablet 25 mg  25 mg oral BID Ronald Grimes MD   25 mg at 06/11/24 2202    cholecalciferol (Vitamin D-3) tablet 2,000 Units  2,000 Units oral Daily Ronald Grimes MD   2,000 Units at 06/11/24 0808    cyanocobalamin (Vitamin B-12) tablet 1,000 mcg  1,000 mcg oral Daily Ronald Grimes MD   1,000 mcg at 06/11/24 0808    dextrose 50 % injection 12.5 g  12.5 g intravenous q15 min PRN Ronald Grimes MD        dextrose 50 % injection 25 g  25 g intravenous q15 min PRN Ronald Grimes MD        docusate sodium (Colace) capsule 100 mg  100 mg oral BID Ronald Grimes MD   100 mg at 06/11/24 2202    folic acid (Folvite) tablet 1 mg  1 mg oral Daily Ronald Grimes MD   1 mg at 06/11/24 0808    glucagon (Glucagen) injection 1 mg  1 mg intramuscular q15 min PRN Ronald Grimes MD        glucagon (Glucagen) injection 1 mg  1 mg intramuscular q15 min PRN Ronald Grimes MD        heparin flush 100 unit/mL syringe 160 Units  1.6 mL intra-catheter PRN Ronald Grimes MD        HYDROmorphone PF (Dilaudid) injection 0.2 mg  0.2 mg intravenous q4h PRN Ronald Grimes MD        insulin lispro  "(HumaLOG) injection 0-10 Units  0-10 Units subcutaneous TID Ronald Grimes MD   2 Units at 24 1601    losartan (Cozaar) tablet 50 mg  50 mg oral Daily Ronald Grimes MD   50 mg at 24 0808    multivitamin with minerals 1 tablet  1 tablet oral Daily Ronald Grimes MD   1 tablet at 24 0808    ondansetron (Zofran) injection 4 mg  4 mg intravenous q6h PRN Ronald Grimes MD        oxyCODONE (Roxicodone) immediate release tablet 5 mg  5 mg oral q6h PRN Ronald Grimes MD   5 mg at 24 0808    oxygen (O2) therapy   inhalation Continuous PRN - O2/gases Rosa Calabrese PA-C        polyethylene glycol (Glycolax, Miralax) packet 17 g  17 g oral Daily Ronald Grimes MD   17 g at 06/10/24 0846    pravastatin (Pravachol) tablet 40 mg  40 mg oral Nightly Ronald Grimes MD   40 mg at 24 2202    pregabalin (Lyrica) capsule 100 mg  100 mg oral BID Ronald Grimes MD   100 mg at 24 2202    zinc sulfate (Zincate) capsule 50 mg of elemental zinc  50 mg of elemental zinc oral Daily Ronald Grimes MD   50 mg of elemental zinc at 24 0808     No current [facility]-ordered outpatient medications on file.         Vitals:   /73   Pulse 75   Temp 37.3 °C (99.2 °F)   Resp 18   Ht 1.549 m (5' 0.98\")   Wt 60.6 kg (133 lb 9.6 oz)   SpO2 91%   BMI 25.26 kg/m²   BLOOD PRESSURE RANGE:  Systolic (24hrs), Av , Min:139 , Max:165   ; Diastolic (24hrs), Av, Min:63, Max:74    24HR INTAKE/OUTPUT:    Intake/Output Summary (Last 24 hours) at 2024 1109  Last data filed at 2024 1104  Gross per 24 hour   Intake 1510 ml   Output 3050 ml   Net -1540 ml       Physical exam:   Constitutional: NAD  Skin: no rash, turgor wnl  Heent:  eomi, mmm  Neck: no bruits or jvd noted  Cardiovascular:  Normal S1, S2 without m/r/g  Respiratory: CTAB  Abdomen:  +bs, soft, nt, nd  Ext: No lower extremity edema        Data:   Labs:  Results for " orders placed or performed during the hospital encounter of 06/06/24 (from the past 24 hour(s))   POCT GLUCOSE   Result Value Ref Range    POCT Glucose 170 (H) 74 - 99 mg/dL   POCT GLUCOSE   Result Value Ref Range    POCT Glucose 231 (H) 74 - 99 mg/dL   POCT GLUCOSE   Result Value Ref Range    POCT Glucose 145 (H) 74 - 99 mg/dL     *Note: Due to a large number of results and/or encounters for the requested time period, some results have not been displayed. A complete set of results can be found in Results Review.            Assessment and Plan:  The patient is a 79-year-old female with history of ESRD, type 2 diabetes mellitus, hypertension, atrial fibrillation, HFpEF, COPD, anemia, and gout.  Patient is admitted to the hospital on 6/8/2024 for laparoscopic cholecystectomy for acute on chronic cholecystitis.  Nephrology is following for ESRD and dialysis management.     ESRD.  Patient dialyzes on TTS schedule.  Patient tolerated dialysis well yesterday.  There is no need for dialysis today.  Will arrange for dialysis on her usual schedule tomorrow 6/13/2024 if she has not yet discharged.    Hypertension.  BP is acceptable.  Continue current antihypertensives.  Further ultrafiltration with dialysis tomorrow should also help to control BP.        Please do not hesitate to contact me at [phone] if there is any question or concern.   Samuel Espinosa MD (Ramy Pearce MD)

## 2024-06-12 NOTE — CARE PLAN
The patient's goals for the shift include        Problem: Skin  Goal: Decreased wound size/increased tissue granulation at next dressing change  Outcome: Progressing  Flowsheets (Taken 6/12/2024 0839)  Decreased wound size/increased tissue granulation at next dressing change:   Protective dressings over bony prominences   Promote sleep for wound healing  Goal: Promote/optimize nutrition  Outcome: Progressing  Flowsheets (Taken 6/12/2024 0839)  Promote/optimize nutrition:   Monitor/record intake including meals   Discuss with provider if NPO > 2 days  Goal: Promote skin healing  Outcome: Progressing  Flowsheets (Taken 6/12/2024 0839)  Promote skin healing:   Assess skin/pad under line(s)/device(s)   Protective dressings over bony prominences   Turn/reposition every 2 hours/use positioning/transfer devices     Problem: Pain  Goal: Takes deep breaths with improved pain control throughout the shift  Outcome: Progressing  Goal: Turns in bed with improved pain control throughout the shift  Outcome: Progressing  Goal: Walks with improved pain control throughout the shift  Outcome: Progressing  Goal: Performs ADL's with improved pain control throughout shift  Outcome: Progressing  Goal: Participates in PT with improved pain control throughout the shift  Outcome: Progressing  Goal: Free from opioid side effects throughout the shift  Outcome: Progressing  Goal: Free from acute confusion related to pain meds throughout the shift  Outcome: Progressing     Problem: Respiratory  Goal: Clear secretions with interventions this shift  Outcome: Progressing  Goal: Minimize anxiety/maximize coping throughout shift  Outcome: Progressing  Goal: Minimal/no exertional discomfort or dyspnea this shift  Outcome: Progressing  Goal: No signs of respiratory distress (eg. Use of accessory muscles. Peds grunting)  Outcome: Progressing  Goal: Patent airway maintained this shift  Outcome: Progressing  Goal: Tolerate mechanical ventilation evidenced  by VS/agitation level this shift  Outcome: Progressing  Goal: Verbalize decreased shortness of breath this shift  Outcome: Progressing  Goal: Increase self care and/or family involvement in next 24 hours  Outcome: Progressing     Problem: Pain - Adult  Goal: Verbalizes/displays adequate comfort level or baseline comfort level  Outcome: Progressing     Problem: Discharge Planning  Goal: Discharge to home or other facility with appropriate resources  Outcome: Progressing     Problem: Chronic Conditions and Co-morbidities  Goal: Patient's chronic conditions and co-morbidity symptoms are monitored and maintained or improved  Outcome: Progressing     Problem: Diabetes  Goal: Achieve decreasing blood glucose levels by end of shift  Outcome: Progressing  Goal: Increase stability of blood glucose readings by end of shift  Outcome: Progressing  Goal: Maintain electrolyte levels within acceptable range throughout shift  Outcome: Progressing  Goal: Maintain glucose levels >70mg/dl to <250mg/dl throughout shift  Outcome: Progressing  Goal: No changes in neurological exam by end of shift  Outcome: Progressing  Goal: Learn about and adhere to nutrition recommendations by end of shift  Outcome: Progressing  Goal: Vital signs within normal range for age by end of shift  Outcome: Progressing  Goal: Increase self care and/or family involovement by end of shift  Outcome: Progressing  Goal: Receive DSME education by end of shift  Outcome: Progressing     Problem: Nutrition  Goal: Less than 5 days NPO/clear liquids  Outcome: Progressing  Goal: Consume prescribed supplement  Outcome: Progressing  Goal: BG  mg/dL  Outcome: Progressing  Goal: Promote healing  Outcome: Progressing  Goal: Maintain stable weight  Outcome: Progressing     Problem: Heart Failure  Goal: Improved gas exchange this shift  Outcome: Progressing  Goal: Improved urinary output this shift  Outcome: Progressing  Goal: Reduction in peripheral edema within 24  hours  Outcome: Progressing  Goal: Report improvement of dyspnea/breathlessness this shift  Outcome: Progressing  Goal: Weight from fluid excess reduced over 2-3 days, then stabilize  Outcome: Progressing  Goal: Increase self care and/or family involvement in 24 hours  Outcome: Progressing     Problem: Pain  Goal: My pain/discomfort is manageable  Outcome: Progressing     Problem: Safety  Goal: I will remain free of falls  Outcome: Progressing     Problem: Daily Care  Goal: Daily care needs are met  Outcome: Progressing     Problem: Psychosocial Needs  Goal: Demonstrates ability to cope with hospitalization/illness  Outcome: Progressing  Goal: Collaborate with me, my family, and caregiver to identify my specific goals  Outcome: Progressing     Problem: Discharge Barriers  Goal: My discharge needs are met  Outcome: Progressing     Problem: Fall/Injury  Goal: Not fall by end of shift  Outcome: Progressing  Goal: Be free from injury by end of the shift  Outcome: Progressing  Goal: Use assistive devices by end of the shift  Outcome: Progressing  Goal: Pace activities to prevent fatigue by end of the shift  Outcome: Progressing

## 2024-06-12 NOTE — PROGRESS NOTES
Physical Therapy    Physical Therapy Treatment    Patient Name: Tana Hargrove  MRN: [id number]  Today's Date: 6/12/2024  Time Calculation  Start Time: 0953  Stop Time: 1027  Time Calculation (min): 34 min    Assessment/Plan   PT Assessment  End of Session Communication: Bedside nurse  End of Session Patient Position: Up in chair, Alarm on     PT Plan  Treatment/Interventions: Bed mobility, Transfer training, Balance training, Strengthening, Endurance training (PROGRESS TO OOB ASSESSMENT WHEN APPROPRIATE)  PT Plan: Skilled PT  PT Frequency: 4 times per week  PT Discharge Recommendations: Moderate intensity level of continued care  PT Recommended Transfer Status: Assist x2 (BED LEVEL)  PT - OK to Discharge: Yes (WHEN MEDICALLY CLEARED)    General Visit Information:   PT  Visit  PT Received On: 06/12/24  General  Prior to Session Communication: Bedside nurse  Patient Position Received: Bed, 3 rail up, Alarm on    Subjective   Precautions:  Precautions  Hearing/Visual Limitations: [place]  Medical Precautions: Fall precautions, Oxygen therapy device and L/min    Objective   Pain:  Pain Assessment  Pain Assessment: 0-10  Pain Score: 0 - No pain  Cognition:  Cognition  Overall Cognitive Status: Within Functional Limits    Treatments:  Therapeutic Exercise  Therapeutic Exercise Performed: Yes  Therapeutic Exercise Activity 1: pt completed seated LE exercises with yellow theraband: hamstring curls x 10 reps, hip flexion x 10 reps, hip adduction x 10 reps, hip abduction x 10 reps    Bed Mobility  Bed Mobility: Yes  Bed Mobility 1  Bed Mobility 1: Supine to sitting  Level of Assistance 1: Minimum assistance, Moderate assistance, Minimal verbal cues  Bed Mobility Comments 1: pt able to navigate LE's to EOB with Min but required Mod to sit torso up    Ambulation/Gait Training  Ambulation/Gait Training Performed:  (pt transfered to chair with FWW and Min Ax1)  Transfers  Transfer: Yes  Transfer 1  Technique 1: Sit to stand, Stand to  sit  Transfer Device 1: Walker  Transfer Level of Assistance 1: Minimum assistance, Minimal verbal cues, Minimal tactile cues    Outcome Measures:  [facility] Basic Mobility  Turning from your back to your side while in a flat bed without using bedrails: A little  Moving from lying on your back to sitting on the side of a flat bed without using bedrails: A little  Moving to and from bed to chair (including a wheelchair): A little  Standing up from a chair using your arms (e.g. wheelchair or bedside chair): A little  To walk in hospital room: A lot  Climbing 3-5 steps with railing: Total  Basic Mobility - Total Score: 15    Education Documentation  Mobility Training, taught by Jalyn Nieves PTA at 6/12/2024 10:40 AM.  Learner: Patient  Readiness: Acceptance  Method: Explanation  Response: Needs Reinforcement    Education Comments  No comments found.        OP EDUCATION:       Encounter Problems       Encounter Problems (Active)       Balance       Goal 1 (Progressing)       Start:  06/08/24    Expected End:  06/22/24       SIT EOB CGA FOR 12 MIN WHILE COMPLETING EX AND FUNCTIONAL ACTIVITIES TO  IMPROVE BALANCE, STRENGTH AND ENDURANCE FOR PROGRESS ION TO OOB ACTIVITIES            Mobility       Goal 1 (Progressing)       Start:  06/08/24    Expected End:  06/29/24       20 REPS AROM/RROM EX IMPROVING STRENGTH TO  PROGRESS OOB ACTIVITIES            PT Transfers       STG - Patient to transfer to and from sit to supine (Progressing)       Start:  06/08/24    Expected End:  06/15/24       MOD X1-2 USING RAILS         STG - Patient will transfer sit to and from stand (Progressing)       Start:  06/08/24    Expected End:  06/22/24       FWW MOD X2 A USING PROPER TECHNIQUE            Pain - Adult

## 2024-06-12 NOTE — CARE PLAN
The patient's goals for the shift include sleep.    The clinical goals for the shift include Patient will remain hemodynamically stable during the length of my shift.    Over the shift, the patient did make progress toward the following goals.     Problem: Skin  Goal: Decreased wound size/increased tissue granulation at next dressing change  6/12/2024 0132 by Michelle Galvin RN  Outcome: Progressing  Flowsheets (Taken 6/12/2024 0132)  Decreased wound size/increased tissue granulation at next dressing change: Protective dressings over bony prominences  6/12/2024 0129 by Michelle Galvin RN  Outcome: Progressing  Goal: Promote/optimize nutrition  6/12/2024 0132 by Michelle Galvin RN  Outcome: Progressing  Flowsheets (Taken 6/12/2024 0132)  Promote/optimize nutrition:   Monitor/record intake including meals   Consume > 50% meals/supplements  6/12/2024 0129 by Michelle Galvin RN  Outcome: Progressing  Goal: Promote skin healing  6/12/2024 0132 by Michelle Galvin RN  Outcome: Progressing  Flowsheets (Taken 6/12/2024 0132)  Promote skin healing: Assess skin/pad under line(s)/device(s)  6/12/2024 0129 by Michelle Galvin RN  Outcome: Progressing     Problem: Pain  Goal: Takes deep breaths with improved pain control throughout the shift  6/12/2024 0132 by Michelle Galvin RN  Outcome: Progressing  6/12/2024 0129 by Michelle Galvin RN  Outcome: Progressing  Goal: Turns in bed with improved pain control throughout the shift  6/12/2024 0132 by Michelle Galvin RN  Outcome: Progressing  6/12/2024 0129 by Michelle Galvin RN  Outcome: Progressing  Goal: Walks with improved pain control throughout the shift  6/12/2024 0132 by Michelle Galvin RN  Outcome: Progressing  6/12/2024 0129 by Michelle Galvin RN  Outcome: Progressing  Goal: Performs ADL's with improved pain control throughout shift  6/12/2024 0132 by Michelle Galvin RN  Outcome: Progressing  6/12/2024 0129 by Michelle Galvin RN  Outcome:  Progressing  Goal: Participates in PT with improved pain control throughout the shift  6/12/2024 0132 by Michelle Galvin RN  Outcome: Progressing  6/12/2024 0129 by Michelle Galvin RN  Outcome: Progressing  Goal: Free from opioid side effects throughout the shift  6/12/2024 0132 by Michelle Galvin RN  Outcome: Progressing  6/12/2024 0129 by Michelle Galvin RN  Outcome: Progressing  Goal: Free from acute confusion related to pain meds throughout the shift  6/12/2024 0132 by Michelle Galvin RN  Outcome: Progressing  6/12/2024 0129 by Michelle Galvin RN  Outcome: Progressing     Problem: Respiratory  Goal: Clear secretions with interventions this shift  Outcome: Progressing  Goal: Minimize anxiety/maximize coping throughout shift  Outcome: Progressing  Goal: Minimal/no exertional discomfort or dyspnea this shift  Outcome: Progressing  Goal: No signs of respiratory distress (eg. Use of accessory muscles. Peds grunting)  Outcome: Progressing  Goal: Patent airway maintained this shift  Outcome: Progressing  Goal: Tolerate mechanical ventilation evidenced by VS/agitation level this shift  Outcome: Progressing  Goal: Verbalize decreased shortness of breath this shift  Outcome: Progressing  Goal: Increase self care and/or family involvement in next 24 hours  Outcome: Progressing     Problem: Pain - Adult  Goal: Verbalizes/displays adequate comfort level or baseline comfort level  6/12/2024 0132 by Michelle Galvin RN  Outcome: Progressing  6/12/2024 0129 by Michelle Galvin RN  Outcome: Progressing     Problem: Discharge Planning  Goal: Discharge to home or other facility with appropriate resources  6/12/2024 0132 by Michelle Galvin RN  Outcome: Progressing  6/12/2024 0129 by Michelle Galvin RN  Outcome: Progressing     Problem: Chronic Conditions and Co-morbidities  Goal: Patient's chronic conditions and co-morbidity symptoms are monitored and maintained or improved  6/12/2024 0132 by Michelle Galvin  RN  Outcome: Progressing  6/12/2024 0129 by Michelle Galvin RN  Outcome: Progressing     Problem: Diabetes  Goal: Achieve decreasing blood glucose levels by end of shift  Outcome: Progressing  Goal: Increase stability of blood glucose readings by end of shift  Outcome: Progressing  Goal: Maintain electrolyte levels within acceptable range throughout shift  Outcome: Progressing  Goal: Maintain glucose levels >70mg/dl to <250mg/dl throughout shift  Outcome: Progressing  Goal: No changes in neurological exam by end of shift  Outcome: Progressing  Goal: Learn about and adhere to nutrition recommendations by end of shift  Outcome: Progressing  Goal: Vital signs within normal range for age by end of shift  Outcome: Progressing  Goal: Increase self care and/or family involovement by end of shift  Outcome: Progressing  Goal: Receive DSME education by end of shift  Outcome: Progressing     Problem: Nutrition  Goal: Less than 5 days NPO/clear liquids  6/12/2024 0132 by Michelle Galvin RN  Outcome: Progressing  6/12/2024 0129 by Michelle Galvin RN  Outcome: Progressing  Goal: Consume prescribed supplement  6/12/2024 0132 by Michelle Galvin RN  Outcome: Progressing  6/12/2024 0129 by Michelle Galvin RN  Outcome: Progressing  Goal: BG  mg/dL  6/12/2024 0132 by Michelle Galvin RN  Outcome: Progressing  6/12/2024 0129 by Michelle Galvin RN  Outcome: Progressing  Goal: Promote healing  6/12/2024 0132 by Michelle Galvin RN  Outcome: Progressing  6/12/2024 0129 by Michelle Galvin RN  Outcome: Progressing  Goal: Maintain stable weight  6/12/2024 0132 by Michelle Galvin RN  Outcome: Progressing  6/12/2024 0129 by Michelle Galvin RN  Outcome: Progressing     Problem: Heart Failure  Goal: Improved gas exchange this shift  6/12/2024 0132 by Michelle Galvin RN  Outcome: Progressing  6/12/2024 0129 by Michelle Galvin RN  Outcome: Progressing  Goal: Improved urinary output this shift  6/12/2024 0132 by  Michelle Galvin RN  Outcome: Progressing  6/12/2024 0129 by Michelle Galvin RN  Outcome: Progressing  Goal: Reduction in peripheral edema within 24 hours  6/12/2024 0132 by Michelle Galvin RN  Outcome: Progressing  6/12/2024 0129 by Michelle Galvin RN  Outcome: Progressing  Goal: Report improvement of dyspnea/breathlessness this shift  6/12/2024 0132 by Michelle Gavlin RN  Outcome: Progressing  6/12/2024 0129 by Michelle Galvin RN  Outcome: Progressing  Goal: Weight from fluid excess reduced over 2-3 days, then stabilize  6/12/2024 0132 by Michelle Galvin RN  Outcome: Progressing  6/12/2024 0129 by Michelle Galvin RN  Outcome: Progressing  Goal: Increase self care and/or family involvement in 24 hours  6/12/2024 0132 by Michelle Galvin RN  Outcome: Progressing  6/12/2024 0129 by Michelle Galvin RN  Outcome: Progressing

## 2024-06-12 NOTE — PROGRESS NOTES
"GENERAL SURGERY PROGRESS NOTE    Tana Hargrove   1945   [id number]     Tana Hargrove is a 79 y.o. female on day 6 of admission presenting with Chronic cholecystitis.      Subjective  Patient having bowel function. Pain well controlled. Calorie count in progress, otherwise tolerating diet. More alert.    Review of Systems   Constitutional:  Negative for chills and fever.   HENT:  Negative for congestion and sore throat.    Eyes:  Negative for discharge and redness.   Respiratory:  Negative for cough and shortness of breath.    Gastrointestinal:  Negative for abdominal pain, constipation, diarrhea, nausea and vomiting.   Endocrine: Negative for cold intolerance and heat intolerance.   Genitourinary:  Negative for dysuria and frequency.   Musculoskeletal:  Negative for arthralgias and back pain.   Skin:  Negative for color change and rash.   Neurological:  Negative for light-headedness and headaches.       Objective    Last Recorded Vitals  Blood pressure 165/74, pulse 74, temperature 36.8 °C (98.2 °F), temperature source Temporal, resp. rate 16, height 1.549 m (5' 0.98\"), weight 60.6 kg (133 lb 9.6 oz), SpO2 92%.    Intake/Output last 3 Shifts:  I/O last 3 completed shifts:  In: 1270 (21 mL/kg) [P.O.:50; I.V.:820 (13.5 mL/kg); Other:400]  Out: 2953 (48.7 mL/kg) [Urine:553 (0.3 mL/kg/hr); Other:2400]  Weight: 60.6 kg     Gen: NAD.  A&Ox3  HEENT: NC/AT.  Moist mucous membranes.  Neck: Normal range of motion.  CV: Regular rate.  Chest: Normal chest rise.  Normal respiratory effort.  Abdomen: Soft.  NT/ND.  No rigidity or guarding. Incision c/d/I.  Extremities: No edema.  Moving all extremities.    Relevant Results  Results for orders placed or performed during the hospital encounter of 06/06/24 (from the past 24 hour(s))   POCT GLUCOSE   Result Value Ref Range    POCT Glucose 227 (H) 74 - 99 mg/dL   POCT GLUCOSE   Result Value Ref Range    POCT Glucose 170 (H) 74 - 99 mg/dL   POCT GLUCOSE   Result Value Ref Range    " POCT Glucose 231 (H) 74 - 99 mg/dL   POCT GLUCOSE   Result Value Ref Range    POCT Glucose 145 (H) 74 - 99 mg/dL     *Note: Due to a large number of results and/or encounters for the requested time period, some results have not been displayed. A complete set of results can be found in Results Review.       ECG 12 Lead    Result Date: 6/10/2024  Sinus rhythm Borderline prolonged AL interval Borderline prolonged QT interval Confirmed by Tanmay Alexander ([id number]) on 6/10/2024 5:15:22 PM    Electrocardiogram, 12-lead PRN ACS symptoms    Result Date: 6/10/2024  Atrial fibrillation Confirmed by Tanmay Alexander ([id number]) on 6/10/2024 5:04:56 PM    XR chest 1 view    Result Date: 6/8/2024  Interpreted By:  Josefa Reyna, STUDY: XR CHEST 1 VIEW; ;  6/8/2024 4:44 pm   INDICATION: Signs/Symptoms:Afib, POD2 s/p lap chidi.   COMPARISON: 05/19/2024   ACCESSION NUMBER(S): [id number]   ORDERING CLINICIAN: ANNIE HENRY   TECHNIQUE: Portable chest   FINDINGS: Tunneled right chest dual lumen catheter unchanged, distal tip projects in the right atrium. Right hemidiaphragm elevation is again noted. Patchy left basal consolidation is increased. Increased perihilar interstitial opacities. Probable small pleural effusions. No pneumothorax. Unchanged cardiomediastinal silhouette. No acute osseous finding.       Probable interstitial edema/venous congestion and small pleural effusions. Increased left basal opacities may be atelectasis or pneumonia.     Signed by: Josefa Reyna 6/8/2024 6:15 PM Dictation workstation:   [id number]    ECG 12 lead    Result Date: 5/25/2024  Sinus rhythm Minimal ST depression, lateral leads Prolonged QT interval See ED provider note for full interpretation and clinical correlation Confirmed by Terri Bermudez ([id number]) on 5/25/2024 1:12:33 PM    US gallbladder    Result Date: 5/19/2024  STUDY: Right upper quadrant Ultrasound; 5/19/2024 4:28 PM INDICATION: Status post cholecystostomy, elevated LFTs.  COMPARISON: CT A&P today, US gallbladder 4/18/2024, MR abdomen 4/18/2024. ACCESSION NUMBER(S): [id number] ORDERING CLINICIAN: CELIA WYNNE TECHNIQUE: Ultrasound of the right upper quadrant.  Multiple images of the right upper quadrant were obtained. FINDINGS: The liver demonstrates normal echogenicity.   The gallbladder contains sludge as well as multiple stones.  There is edematous wall thickening.  The cholecystectomy tube is visualized within the gallbladder.  Sonographic Luna's sign is negative.    The common bile duct measures 0.3 cm.  There is no intrahepatic biliary dilatation.   The pancreas is not well seen due to overlying bowel gas.  The right kidney measures 9.8 cm in length.  Renal cortical thinning is present and echotexture is increased.  There is no hydronephrosis. There are no stones.  There is a simple cyst measuring 1.2 x 1.0 x 0.8 cm within the midportion.    Sludge and stones within the gallbladder with edematous wall thickening.  No biliary dilatation is appreciated. Increased echogenicity and thinning of the right renal cortex consistent with intrinsic renal disease.  No hydronephrosis. Signed by Lonnie Rodriguez MD    CT abdomen pelvis wo IV contrast    Result Date: 5/19/2024  STUDY: CT Abdomen and Pelvis without IV Contrast; 05/19/2024, 1:35 PM. INDICATION: Signs/Symptoms:Gallbladder surgery, elevated liver enzymes and lipase from baseline. COMPARISON: XR pelvis: 05/19/24, 04/23/24; IR Biliary: 04/27/24; MR abdomen: 04/14/24; US gallbladder: 04/18/24. ACCESSION NUMBER(S): [id number] ORDERING CLINICIAN: CELIA WYNNE TECHNIQUE: CT of the abdomen and pelvis was performed.  Contiguous axial images were obtained at 3 mm slice thickness through the abdomen and pelvis. Coronal and sagittal reconstructions at 3 mm slice thickness were performed. No intravenous contrast was administered.  Automated mA/kV exposure control was utilized and patient examination was performed in strict accordance with  principles of ALARA. FINDINGS: Please note that the evaluation of vessels, lymph nodes and organs is limited without intravenous contrast.  LOWER CHEST: No cardiomegaly.  No pericardial effusion.  Atelectatic changes are noted.  Venous catheter terminates in the RIGHT atrium. Atherosclerosis of the thoracic aorta and coronary arteries is present.  ABDOMEN:  LIVER: No hepatomegaly.  Smooth surface contour.  Normal attenuation.  BILE DUCTS: No intrahepatic or extrahepatic biliary ductal dilatation.  GALLBLADDER: Percutaneously placed drainage tube noted within the maria isabel hepatis. This appears to be a colostomy tube with decompressed gallbladder versus tubing status post cholecystectomy with residual fluid within the gallbladder fossa.  Clinical correlation for operative history recommended. STOMACH: No abnormalities identified.  PANCREAS: No masses or ductal dilatation.  SPLEEN: No splenomegaly or focal splenic lesion.  ADRENAL GLANDS: No thickening or nodules.  KIDNEYS AND URETERS: Kidneys are normal in size and location.  No renal or ureteral calculi.  PELVIS:  BLADDER: No abnormalities identified.  REPRODUCTIVE ORGANS: Patient is status post hysterectomy. Scattered phleboliths are present within the pelvis.  BOWEL: No abnormalities identified.  VESSELS: No abnormalities identified.  Atherosclerosis of the abdominal aorta and iliac arteries is noted.    PERITONEUM/RETROPERITONEUM/LYMPH NODES: No free fluid.  No pneumoperitoneum. No lymphadenopathy.  ABDOMINAL WALL: No abnormalities identified.  Midline incision scar noted. SOFT TISSUES: No abnormalities identified.   BONES: Patient is status post RIGHT femoral ORIF.  Patient is status post multilevel posterior fusion of L2-L5.  Intervertebral disc spacers at L5-S1 also noted.  Dextroconvex scoliosis of the lumbar spine noted. Moderate degenerative change of the hips demonstrated. Senescent changes are present within the lumbar spine.  No acute fracture or aggressive  osseous lesion.    Percutaneous drainage tube noted within the maria isabel hepatis. This appears to be related to a decompressed gallbladder versus positioning status post cholecystectomy with residual fluid within the gallbladder fossa. Clinical correlation for operative history recommended. Signed by Abhi Godoy II, MD    XR pelvis 1-2 views    Result Date: 5/19/2024  STUDY: Pelvis Radiographs; 5/19/2024 10:54AM INDICATION: Fall. COMPARISON: 4/23/2024 XR Pelvis. ACCESSION NUMBER(S): [id number] ORDERING CLINICIAN: CELIA WYNNE TECHNIQUE:  One view(s) of the pelvis. FINDINGS:  The pelvic ring is intact.  There is no acute fracture.  Postsurgical changes of the proximal right femur without evidence of hardware malfunction.  Mild degenerative change of both hips.    No acute osseous abnormality. Signed by Lonnie Rodriguez MD    XR chest 1 view    Result Date: 5/19/2024  STUDY: Chest Radiograph;  5/19/2024 10:54AM INDICATION: Fall. COMPARISON: 4/23/2024 XR Chest. ACCESSION NUMBER(S): [id number] ORDERING CLINICIAN: CELIA WYNNE TECHNIQUE:  Frontal chest was obtained at 11:53 hours. FINDINGS: CARDIOMEDIASTINAL SILHOUETTE: Cardiomediastinal silhouette is normal in size and configuration.  LUNGS: Lungs are clear.  Right-sided dialysis catheter with tip near the cavoatrial junction.  Elevation of the right hemidiaphragm.  ABDOMEN: No remarkable upper abdominal findings.  BONES: No acute osseous changes.    No acute process. Signed by Lonnie Rodriguez MD      Assessment and Plan  Principal Problem:    Chronic cholecystitis  Active Problems:    Chronic diastolic heart failure of unknown etiology (Multi)    Hypertension, essential    GERD (gastroesophageal reflux disease)    Hyperlipidemia    Myelodysplastic syndrome (Multi)    COPD without exacerbation (Multi)    ESRD (end stage renal disease) on dialysis (Multi)    Anemia due to chronic kidney disease, on chronic dialysis (Multi)    Atrial fibrillation with rapid ventricular  response (Multi)    79 y.o. female with chronic cholecystitis s/p lap subtotal fenestrated cholecystectomy. Patient doing well overall    - continue nursing floor care  - ambulation, IS  - up to chair as much as possible  - continue eliquis 2.5  - pending discharge to SNF - awaiting insurance auth    Discussed with Dr. Sydney Kohler, DO PGY2  General Surgery

## 2024-06-13 ENCOUNTER — PHARMACY VISIT (OUTPATIENT)
Dept: PHARMACY | Facility: CLINIC | Age: 79
End: 2024-06-13
Payer: MEDICARE

## 2024-06-13 ENCOUNTER — APPOINTMENT (OUTPATIENT)
Dept: DIALYSIS | Facility: HOSPITAL | Age: 79
End: 2024-06-13
Payer: MEDICARE

## 2024-06-13 VITALS
HEIGHT: 61 IN | DIASTOLIC BLOOD PRESSURE: 69 MMHG | HEART RATE: 76 BPM | BODY MASS INDEX: 26.64 KG/M2 | WEIGHT: 141.09 LBS | SYSTOLIC BLOOD PRESSURE: 162 MMHG | RESPIRATION RATE: 16 BRPM | OXYGEN SATURATION: 96 % | TEMPERATURE: 99.6 F

## 2024-06-13 PROBLEM — K81.1 CHRONIC CHOLECYSTITIS: Status: RESOLVED | Noted: 2024-04-22 | Resolved: 2024-06-13

## 2024-06-13 LAB — GLUCOSE BLD MANUAL STRIP-MCNC: 134 MG/DL (ref 74–99)

## 2024-06-13 PROCEDURE — 2500000001 HC RX 250 WO HCPCS SELF ADMINISTERED DRUGS (ALT 637 FOR MEDICARE OP): Performed by: SURGERY

## 2024-06-13 PROCEDURE — 8010000001 HC DIALYSIS - HEMODIALYSIS PER DAY

## 2024-06-13 PROCEDURE — RXMED WILLOW AMBULATORY MEDICATION CHARGE

## 2024-06-13 PROCEDURE — 99232 SBSQ HOSP IP/OBS MODERATE 35: CPT | Performed by: HOSPITALIST

## 2024-06-13 PROCEDURE — 82947 ASSAY GLUCOSE BLOOD QUANT: CPT

## 2024-06-13 PROCEDURE — 2500000004 HC RX 250 GENERAL PHARMACY W/ HCPCS (ALT 636 FOR OP/ED): Performed by: INTERNAL MEDICINE

## 2024-06-13 RX ORDER — DOCUSATE SODIUM 100 MG/1
100 CAPSULE, LIQUID FILLED ORAL 2 TIMES DAILY
Qty: 60 CAPSULE | Refills: 0 | Status: SHIPPED | OUTPATIENT
Start: 2024-06-13 | End: 2024-07-13

## 2024-06-13 RX ORDER — ACETAMINOPHEN 325 MG/1
650 TABLET ORAL EVERY 6 HOURS PRN
Start: 2024-06-13

## 2024-06-13 RX ORDER — OXYCODONE HYDROCHLORIDE 5 MG/1
5 TABLET ORAL EVERY 6 HOURS PRN
Qty: 15 TABLET | Refills: 0 | Status: SHIPPED | OUTPATIENT
Start: 2024-06-13 | End: 2024-06-13

## 2024-06-13 RX ORDER — MULTIVIT-MIN/IRON FUM/FOLIC AC 7.5 MG-4
1 TABLET ORAL DAILY
Qty: 30 TABLET | Refills: 0 | Status: SHIPPED | OUTPATIENT
Start: 2024-06-13 | End: 2024-07-13

## 2024-06-13 RX ORDER — HEPARIN SODIUM 1000 [USP'U]/ML
1600 INJECTION, SOLUTION INTRAVENOUS; SUBCUTANEOUS
Status: DISCONTINUED | OUTPATIENT
Start: 2024-06-13 | End: 2024-06-13 | Stop reason: HOSPADM

## 2024-06-13 RX ORDER — OXYCODONE HYDROCHLORIDE 5 MG/1
5 TABLET ORAL EVERY 6 HOURS PRN
Qty: 15 TABLET | Refills: 0 | Status: SHIPPED | OUTPATIENT
Start: 2024-06-13

## 2024-06-13 RX ORDER — CARVEDILOL 25 MG/1
25 TABLET ORAL 2 TIMES DAILY
Qty: 60 TABLET | Refills: 0 | Status: SHIPPED | OUTPATIENT
Start: 2024-06-13 | End: 2024-07-13

## 2024-06-13 ASSESSMENT — PAIN SCALES - GENERAL: PAINLEVEL_OUTOF10: 0 - NO PAIN

## 2024-06-13 ASSESSMENT — ENCOUNTER SYMPTOMS
EYE DISCHARGE: 0
DIARRHEA: 0
CHILLS: 0
EYE REDNESS: 0
FEVER: 0
DYSURIA: 0
ARTHRALGIAS: 0
VOMITING: 0
SORE THROAT: 0
ABDOMINAL PAIN: 0
SHORTNESS OF BREATH: 0
COLOR CHANGE: 0
FREQUENCY: 0
COUGH: 0
HEADACHES: 0
BACK PAIN: 0
NAUSEA: 0
CONSTIPATION: 0
LIGHT-HEADEDNESS: 0

## 2024-06-13 NOTE — NURSING NOTE
Patient discharged to [facility]. Discahrge paperwork sent with patient and report called to receiving nurse. IV removed prior to DC. No further questions at this time

## 2024-06-13 NOTE — PROGRESS NOTES
Occupational Therapy                 Therapy Communication Note    Patient Name: Tana Hargrove  MRN: [id number]  Today's Date: 6/13/2024     Discipline: Occupational Therapy    Missed Visit Reason: Missed Visit Reason: (P) Patient refused (Patient declined OT session, patient had dialysis earlier today.)    Missed Time: Attempt    Comment:

## 2024-06-13 NOTE — PROGRESS NOTES
Nephrology Progress Note    Following for ESRD.  Seen on HD today- tolerating well so far. BP on higher side but improving with UF on HD         Current Inpatient Medications:  Current Facility-Administered Medications Ordered in Epic   Medication Dose Route Frequency Provider Last Rate Last Admin    acetaminophen (Tylenol) tablet 650 mg  650 mg oral q6h PRN Ronald Grimse MD        apixaban (Eliquis) tablet 2.5 mg  2.5 mg oral q12h Ronald Grimes MD   2.5 mg at 06/12/24 2141    ascorbic acid (Vitamin C) tablet 500 mg  500 mg oral Daily Ronald Grimes MD   500 mg at 06/12/24 1121    bisacodyl (Dulcolax) suppository 10 mg  10 mg rectal Daily PRN Ronald Grimes MD        carvedilol (Coreg) tablet 25 mg  25 mg oral BID Ronald Grimes MD   25 mg at 06/12/24 2141    cholecalciferol (Vitamin D-3) tablet 2,000 Units  2,000 Units oral Daily Ronald Grimes MD   2,000 Units at 06/12/24 1121    cyanocobalamin (Vitamin B-12) tablet 1,000 mcg  1,000 mcg oral Daily Ronald Grimes MD   1,000 mcg at 06/12/24 1121    dextrose 50 % injection 12.5 g  12.5 g intravenous q15 min PRN Ronald Grimes MD        dextrose 50 % injection 25 g  25 g intravenous q15 min PRN Ronald Grimes MD        docusate sodium (Colace) capsule 100 mg  100 mg oral BID Ronald Grimes MD   100 mg at 06/12/24 2141    folic acid (Folvite) tablet 1 mg  1 mg oral Daily Ronald Grimes MD   1 mg at 06/12/24 1121    glucagon (Glucagen) injection 1 mg  1 mg intramuscular q15 min PRN Ronald Grimes MD        glucagon (Glucagen) injection 1 mg  1 mg intramuscular q15 min PRN Ronald Grimes MD        heparin flush 100 unit/mL syringe 160 Units  1.6 mL intra-catheter PRN Ronald Grimes MD        HYDROmorphone PF (Dilaudid) injection 0.2 mg  0.2 mg intravenous q4h PRN Ronald Grimes MD        insulin lispro (HumaLOG) injection 0-10 Units  0-10 Units  "subcutaneous TID Ronald Grimes MD   4 Units at 24 1802    losartan (Cozaar) tablet 50 mg  50 mg oral Daily Ronald Grimes MD   50 mg at 24 1121    multivitamin with minerals 1 tablet  1 tablet oral Daily Ronald Grimes MD   1 tablet at 24 1121    ondansetron (Zofran) injection 4 mg  4 mg intravenous q6h PRN Ronald Grimes MD        oxyCODONE (Roxicodone) immediate release tablet 5 mg  5 mg oral q6h PRN Ronald Grimes MD   5 mg at 24 0808    oxygen (O2) therapy   inhalation Continuous PRN - O2/gases Rosa Calabrese PA-C        polyethylene glycol (Glycolax, Miralax) packet 17 g  17 g oral Daily Ronald Grimes MD   17 g at 06/10/24 0846    pravastatin (Pravachol) tablet 40 mg  40 mg oral Nightly Ronald Grimes MD   40 mg at 24 2141    pregabalin (Lyrica) capsule 100 mg  100 mg oral BID Ronald Grimes MD   100 mg at 24 2140    zinc sulfate (Zincate) capsule 50 mg of elemental zinc  50 mg of elemental zinc oral Daily Ronald Grimes MD   50 mg of elemental zinc at 24 1121     No current [facility]-ordered outpatient medications on file.         Vitals:   BP (!) 186/77 (BP Location: Right arm, Patient Position: Lying) Comment: [place] RN notified  Pulse 78   Temp 37.7 °C (99.8 °F) (Temporal)   Resp 17   Ht 1.549 m (5' 0.98\")   Wt 64 kg (141 lb 1.5 oz)   SpO2 95%   BMI 26.67 kg/m²   BLOOD PRESSURE RANGE:  Systolic (24hrs), Av , Min:132 , Max:186   ; Diastolic (24hrs), Av, Min:65, Max:78    24HR INTAKE/OUTPUT:    Intake/Output Summary (Last 24 hours) at 2024 1043  Last data filed at 2024 0944  Gross per 24 hour   Intake 2430 ml   Output 1150 ml   Net 1280 ml       Physical exam:   Constitutional: NAD  Skin: no rash, turgor wnl  Heent:  eomi, mmm  Neck: no bruits or jvd noted  Cardiovascular:  Normal S1, S2 without m/r/g  Respiratory: CTAB  Abdomen:  +bs, soft, nt, nd  Ext: No lower extremity " edema        Data:   Labs:  Results for orders placed or performed during the hospital encounter of 06/06/24 (from the past 24 hour(s))   POCT GLUCOSE   Result Value Ref Range    POCT Glucose 188 (H) 74 - 99 mg/dL   POCT GLUCOSE   Result Value Ref Range    POCT Glucose 239 (H) 74 - 99 mg/dL   POCT GLUCOSE   Result Value Ref Range    POCT Glucose 151 (H) 74 - 99 mg/dL   POCT GLUCOSE   Result Value Ref Range    POCT Glucose 134 (H) 74 - 99 mg/dL     *Note: Due to a large number of results and/or encounters for the requested time period, some results have not been displayed. A complete set of results can be found in Results Review.            Assessment and Plan:  The patient is a 79-year-old female with history of ESRD, type 2 diabetes mellitus, hypertension, atrial fibrillation, HFpEF, COPD, anemia, and gout.  Patient is admitted to the hospital on 6/8/2024 for laparoscopic cholecystectomy for acute on chronic cholecystitis.  Nephrology is following for ESRD and dialysis management.     ESRD.  Patient dialyzes on TTS schedule.  Patient on HD now and tolerating well so far    Hypertension.  BP is acceptable.  Continue current antihypertensives.  Improving with UF on HD today     Once discharged she will continue HD at [facility] and rehab in [place]     Please do not hesitate to contact me at [phone] if there is any question or concern.   Samuel Espinosa MD (Ramy Pearce MD)

## 2024-06-13 NOTE — PROGRESS NOTES
"GENERAL SURGERY PROGRESS NOTE    Tana Hargrove   1945   [id number]     Tana Hargrove is a 79 y.o. female on day 7 of admission presenting with Chronic cholecystitis.      Subjective  Patient doing well, no acute complaints. Tolerating diet, pain well controlled. Dialysis today.    Review of Systems   Constitutional:  Negative for chills and fever.   HENT:  Negative for congestion and sore throat.    Eyes:  Negative for discharge and redness.   Respiratory:  Negative for cough and shortness of breath.    Gastrointestinal:  Negative for abdominal pain, constipation, diarrhea, nausea and vomiting.   Endocrine: Negative for cold intolerance and heat intolerance.   Genitourinary:  Negative for dysuria and frequency.   Musculoskeletal:  Negative for arthralgias and back pain.   Skin:  Negative for color change and rash.   Neurological:  Negative for light-headedness and headaches.       Objective    Last Recorded Vitals  Blood pressure (!) 186/77, pulse 78, temperature 37.7 °C (99.8 °F), temperature source Temporal, resp. rate 17, height 1.549 m (5' 0.98\"), weight 64 kg (141 lb 1.5 oz), SpO2 95%.    Intake/Output last 3 Shifts:  I/O last 3 completed shifts:  In: 1050 (16.4 mL/kg) [P.O.:1020; I.V.:30 (0.5 mL/kg)]  Out: 1600 (25 mL/kg) [Urine:1600 (0.7 mL/kg/hr)]  Weight: 64 kg     Gen: NAD.  A&Ox3  HEENT: NC/AT.  Moist mucous membranes.  Neck: Normal range of motion.  CV: Regular rate.  Chest: Normal chest rise.  Normal respiratory effort.  Abdomen: Soft.  NT/ND.  No rigidity or guarding. Incision c/d/I.  Extremities: No edema.  Moving all extremities.    Relevant Results  Results for orders placed or performed during the hospital encounter of 06/06/24 (from the past 24 hour(s))   POCT GLUCOSE   Result Value Ref Range    POCT Glucose 239 (H) 74 - 99 mg/dL   POCT GLUCOSE   Result Value Ref Range    POCT Glucose 151 (H) 74 - 99 mg/dL   POCT GLUCOSE   Result Value Ref Range    POCT Glucose 134 (H) 74 - 99 mg/dL "     *Note: Due to a large number of results and/or encounters for the requested time period, some results have not been displayed. A complete set of results can be found in Results Review.       ECG 12 Lead    Result Date: 6/10/2024  Sinus rhythm Borderline prolonged AK interval Borderline prolonged QT interval Confirmed by Tanmay Alexander ([id number]) on 6/10/2024 5:15:22 PM    Electrocardiogram, 12-lead PRN ACS symptoms    Result Date: 6/10/2024  Atrial fibrillation Confirmed by Tanmay Alexander ([id number]) on 6/10/2024 5:04:56 PM    XR chest 1 view    Result Date: 6/8/2024  Interpreted By:  Josefa Reyna, STUDY: XR CHEST 1 VIEW; ;  6/8/2024 4:44 pm   INDICATION: Signs/Symptoms:Afib, POD2 s/p lap chidi.   COMPARISON: 05/19/2024   ACCESSION NUMBER(S): [id number]   ORDERING CLINICIAN: ANNIE HENRY   TECHNIQUE: Portable chest   FINDINGS: Tunneled right chest dual lumen catheter unchanged, distal tip projects in the right atrium. Right hemidiaphragm elevation is again noted. Patchy left basal consolidation is increased. Increased perihilar interstitial opacities. Probable small pleural effusions. No pneumothorax. Unchanged cardiomediastinal silhouette. No acute osseous finding.       Probable interstitial edema/venous congestion and small pleural effusions. Increased left basal opacities may be atelectasis or pneumonia.     Signed by: Josefa Reyna 6/8/2024 6:15 PM Dictation workstation:   [id number]    ECG 12 lead    Result Date: 5/25/2024  Sinus rhythm Minimal ST depression, lateral leads Prolonged QT interval See ED provider note for full interpretation and clinical correlation Confirmed by Terri Bermudez ([id number]) on 5/25/2024 1:12:33 PM    US gallbladder    Result Date: 5/19/2024  STUDY: Right upper quadrant Ultrasound; 5/19/2024 4:28 PM INDICATION: Status post cholecystostomy, elevated LFTs. COMPARISON: CT A&P today, US gallbladder 4/18/2024, MR abdomen 4/18/2024. ACCESSION NUMBER(S): [id number] ORDERING  CLINICIAN: CELIA WYNNE TECHNIQUE: Ultrasound of the right upper quadrant.  Multiple images of the right upper quadrant were obtained. FINDINGS: The liver demonstrates normal echogenicity.   The gallbladder contains sludge as well as multiple stones.  There is edematous wall thickening.  The cholecystectomy tube is visualized within the gallbladder.  Sonographic Luna's sign is negative.    The common bile duct measures 0.3 cm.  There is no intrahepatic biliary dilatation.   The pancreas is not well seen due to overlying bowel gas.  The right kidney measures 9.8 cm in length.  Renal cortical thinning is present and echotexture is increased.  There is no hydronephrosis. There are no stones.  There is a simple cyst measuring 1.2 x 1.0 x 0.8 cm within the midportion.    Sludge and stones within the gallbladder with edematous wall thickening.  No biliary dilatation is appreciated. Increased echogenicity and thinning of the right renal cortex consistent with intrinsic renal disease.  No hydronephrosis. Signed by Lonnie Rodriguez MD    CT abdomen pelvis wo IV contrast    Result Date: 5/19/2024  STUDY: CT Abdomen and Pelvis without IV Contrast; 05/19/2024, 1:35 PM. INDICATION: Signs/Symptoms:Gallbladder surgery, elevated liver enzymes and lipase from baseline. COMPARISON: XR pelvis: 05/19/24, 04/23/24; IR Biliary: 04/27/24; MR abdomen: 04/14/24; US gallbladder: 04/18/24. ACCESSION NUMBER(S): [id number] ORDERING CLINICIAN: CELIA WYNNE TECHNIQUE: CT of the abdomen and pelvis was performed.  Contiguous axial images were obtained at 3 mm slice thickness through the abdomen and pelvis. Coronal and sagittal reconstructions at 3 mm slice thickness were performed. No intravenous contrast was administered.  Automated mA/kV exposure control was utilized and patient examination was performed in strict accordance with principles of ALARA. FINDINGS: Please note that the evaluation of vessels, lymph nodes and organs is limited without  intravenous contrast.  LOWER CHEST: No cardiomegaly.  No pericardial effusion.  Atelectatic changes are noted.  Venous catheter terminates in the RIGHT atrium. Atherosclerosis of the thoracic aorta and coronary arteries is present.  ABDOMEN:  LIVER: No hepatomegaly.  Smooth surface contour.  Normal attenuation.  BILE DUCTS: No intrahepatic or extrahepatic biliary ductal dilatation.  GALLBLADDER: Percutaneously placed drainage tube noted within the maria isabel hepatis. This appears to be a colostomy tube with decompressed gallbladder versus tubing status post cholecystectomy with residual fluid within the gallbladder fossa.  Clinical correlation for operative history recommended. STOMACH: No abnormalities identified.  PANCREAS: No masses or ductal dilatation.  SPLEEN: No splenomegaly or focal splenic lesion.  ADRENAL GLANDS: No thickening or nodules.  KIDNEYS AND URETERS: Kidneys are normal in size and location.  No renal or ureteral calculi.  PELVIS:  BLADDER: No abnormalities identified.  REPRODUCTIVE ORGANS: Patient is status post hysterectomy. Scattered phleboliths are present within the pelvis.  BOWEL: No abnormalities identified.  VESSELS: No abnormalities identified.  Atherosclerosis of the abdominal aorta and iliac arteries is noted.    PERITONEUM/RETROPERITONEUM/LYMPH NODES: No free fluid.  No pneumoperitoneum. No lymphadenopathy.  ABDOMINAL WALL: No abnormalities identified.  Midline incision scar noted. SOFT TISSUES: No abnormalities identified.   BONES: Patient is status post RIGHT femoral ORIF.  Patient is status post multilevel posterior fusion of L2-L5.  Intervertebral disc spacers at L5-S1 also noted.  Dextroconvex scoliosis of the lumbar spine noted. Moderate degenerative change of the hips demonstrated. Senescent changes are present within the lumbar spine.  No acute fracture or aggressive osseous lesion.    Percutaneous drainage tube noted within the maria isabel hepatis. This appears to be related to a  decompressed gallbladder versus positioning status post cholecystectomy with residual fluid within the gallbladder fossa. Clinical correlation for operative history recommended. Signed by Abhi Godoy II, MD    XR pelvis 1-2 views    Result Date: 5/19/2024  STUDY: Pelvis Radiographs; 5/19/2024 10:54AM INDICATION: Fall. COMPARISON: 4/23/2024 XR Pelvis. ACCESSION NUMBER(S): [id number] ORDERING CLINICIAN: CELIA WYNNE TECHNIQUE:  One view(s) of the pelvis. FINDINGS:  The pelvic ring is intact.  There is no acute fracture.  Postsurgical changes of the proximal right femur without evidence of hardware malfunction.  Mild degenerative change of both hips.    No acute osseous abnormality. Signed by Lonnie Rodriguez MD    XR chest 1 view    Result Date: 5/19/2024  STUDY: Chest Radiograph;  5/19/2024 10:54AM INDICATION: Fall. COMPARISON: 4/23/2024 XR Chest. ACCESSION NUMBER(S): [id number] ORDERING CLINICIAN: CELIA WYNNE TECHNIQUE:  Frontal chest was obtained at 11:53 hours. FINDINGS: CARDIOMEDIASTINAL SILHOUETTE: Cardiomediastinal silhouette is normal in size and configuration.  LUNGS: Lungs are clear.  Right-sided dialysis catheter with tip near the cavoatrial junction.  Elevation of the right hemidiaphragm.  ABDOMEN: No remarkable upper abdominal findings.  BONES: No acute osseous changes.    No acute process. Signed by Lonnie Rodriguez MD      Assessment and Plan  Principal Problem:    Chronic cholecystitis  Active Problems:    Chronic diastolic heart failure of unknown etiology (Multi)    Hypertension, essential    GERD (gastroesophageal reflux disease)    Hyperlipidemia    Myelodysplastic syndrome (Multi)    COPD without exacerbation (Multi)    ESRD (end stage renal disease) on dialysis (Multi)    Anemia due to chronic kidney disease, on chronic dialysis (Multi)    Atrial fibrillation with rapid ventricular response (Multi)    79 y.o. female with chronic cholecystitis s/p lap subtotal fenestrated cholecystectomy.  Patient doing well overall    - continue nursing floor care  - ambulation, IS  - up to chair as much as possible  - continue eliquis 2.5  - pending discharge to SNF - awaiting insurance auth    Discussed with Dr. Sydney Kohler,  PGY2  General Surgery

## 2024-06-13 NOTE — DISCHARGE INSTRUCTIONS
You have skin glue over your wounds - it should start peeling off when you shower.    No tub baths, swimming, nor submerging of the wounds underwater until seen at your follow-up appointment.    No heavy lifting of anything greater than 10lbs for a total of 6 weeks from your date of surgery.    If you have any urgent issues or emergencies whilst at home, please feel free to call/text me directly at #[phone] ().

## 2024-06-13 NOTE — PROGRESS NOTES
Chaya is approved for admission to [facility]. Dr Grimes messaged as he is primary. He will place DC orders shortly. Will have [facility] send orders and arrange transportation.

## 2024-06-13 NOTE — PROCEDURES
Dictation #1  MRN:[id number]  CSN:[id number] Report to Receiving RN:    Report To: Chrissy Quiros RN  Time Report Called: 1300  Hand-Off Communication:   Pt tolerated tx well. Removed 1.5L   Post vitals: 216/98 (76) - 66.7 kg - 99.1*F  Complications During Treatment: No  Ultrafiltration Treatment: No  Medications Administered During Dialysis: No  Blood Products Administered During Dialysis: No  Labs Sent During Dialysis: No  Heparin Drip Rate Changes: N/A  Dialysis Catheter Dressing: Clean, dry, intact. Dressing changed pre tx Dated 06/13/2024 and initialed TE  Last Dressing Change: 06/20/2024    Electronic Signatures:  Macey Waller OCDT    Last Updated: 1:08 PM by MACEY WALLER

## 2024-06-13 NOTE — PROGRESS NOTES
Tana Hargrove [id number]   Service: Internal Medicine / Hospitalist Date of service: 6/13/2024                                  Full Code                           Subjective       History Of Present Illness:    Tana Hargrove is a 79 y.o. female with a significant past medical history of HTN, HLD, CAD, paroxysmal atrial fibrillation (not on AC d/t anemia and need for recurrent blood transfusion), HFpEF, COPD (no baseline O2), NIDDM2, ESRD on HD, chronic anemia requiring Procrit and intermittent PRBC infusions (in setting of MDS and ESRD), OA, anxiety / depression, GERD, and chronic cholecystitis who presented 06/06/24 for laparoscopic fenestrated subtotal chidi I/s/o acute on chronic cholecystitis. Patient current on Hospital Day #2, being followed by surgery (primary) and nephrology (for HD). Medicine was consulted for perioperative medical management and assistance with management of atrial fibrillation with RVR, acute on chronic anemia. Cardiology also consulted on HD #2.     Patient seen and examined awaiting transfer from  --> ICU in setting of AF RVR, acute on chronic anemia.  Patient reports that she has felt generally weak, rundown, and has had overall very poor appetite and intake following her surgery.  She also admits to an ongoing degree of mild abdominal pain and discomfort which contributes to her not wanting to take in as much.  Very mild nausea, no emesis.  Since her operation, she has been on oxygen which she does not normally wear at home, but it has been slowly decreasing in the amount she needs and she does not report feeling acutely short of breath or winded.  She denies any chest pain or pressure, palpitations, dizziness or lightheadedness, diaphoresis, headache, vision changes, focal or lateralizing sensorimotor deficits.  She did undergo dialysis today, and felt that that went well.  Overall she feels a bit discouraged regarding how she feels right now.        6/9................No  reported : palpitations, headaches, syncope, chest pains, nausea or vomiting.Patient endorsed mild abdominal discomfort but voiced no other complaints.     6/10.....................Patient seen post JANE drain removal.  She complains of follow-up post removal related pain and discomfort but overall doing well.  No reported headaches, chest pains, palpitations, nausea or vomiting.     6/11.................No acute complaints voiced by patient today.  No reported palpitations, headaches, chest pains, fevers, nausea or vomiting.  Patient reported that she had physical therapy today.     6/12.................No reported :chest pains, dyspnea, palpitations, epistaxis, melena, hematochezia or other overt signs of bleeding.    6/13..................No reported: Headaches, chest pains, nausea, vomiting, fevers or chills.In addition no overt signs of bleeding reported.        Review of Systems:   Review of system otherwise negative if not aforementioned above in subjective.     Objective        Physical Exam      Constitutional:       Appearance: Patient appeared in no acute cardiopulmonary distress.     Comments: Patient alert and oriented to person place time and situation.Patient appeared nontoxic, up in chair .  HEENT:      Head: Normocephalic and atraumatic.Trachea midline      Nose:No observed congestion or rhinorrhea.     Mouth/Throat: Mucous membranes Moist, Trachea appeared  midline.  Eyes:      Extraocular Movements: Extraocular movements intact.      Pupils: Pupils are equal, round, and reactive to light.      Comments: No scleral icterus or conjunctival injection appreciated.   Cardiovascular:      Rate and Rhythm: Normal rate and regular rhythm. No clicks rubs or gallops, normal S1 and S2.No peripheral stigmata of endocarditis appreciated.     Pulmonary:      Lungs appeared clear to auscultation, no adventitious sound appreciated.  Abdominal:      General: Abdomen soft, nondistended active bowel sounds, no  involuntary guarding or rebound tenderness appreciated.     Comments: Exam limited by seated position.  Musculoskeletal:       Patient appeared to have full active range of motion for upper and lower extremities, no acute apparent joint deformity appreciated on examination.   No pitting edema or cyanosis appreciated.       Lymphadenopathy:      No appreciable palpable lymphadenopathy  Skin:     General: Skin is warm.      Coloration:  No jaundice     Findings: No abnormal appearing skin rashes or lesions that appeared acute noted on unclothed area of the skin..   Neurological:      General: No focal sensory or motor deficits appreciated, no meningeal signs or dysmetria noted.      Cranial Nerves: Cranial nerves II to XII appearing grossly intact.     Genitals:  Deferred  Psychiatric:         The patient appears to be displaying normal mood and affect at the time of evaluation.     Labs:               Lab Results   Component Value Date     GLUCOSE 112 (H) 06/09/2024     CALCIUM 8.5 (L) 06/09/2024      06/09/2024     K 4.0 06/09/2024     CO2 30 06/09/2024      06/09/2024     BUN 17 06/09/2024     CREATININE 1.59 (H) 06/09/2024                          Lab Results   Component Value Date     GLUCOSE 115 (H) 06/10/2024     CALCIUM 8.6 06/10/2024      (L) 06/10/2024     K 3.7 06/10/2024     CO2 28 06/10/2024      06/10/2024     BUN 25 (H) 06/10/2024     CREATININE 2.02 (H) 06/10/2024                                                                    [unfilled]   [unfilled]   Susceptibility data from last 90 days.  Collected Specimen Info Organism Amoxicillin/Clavulanate Ampicillin Ampicillin/Sulbactam Cefazolin Cefazolin (uncomplicated UTIs only) Ciprofloxacin Gentamicin Levofloxacin Nitrofurantoin Piperacillin/Tazobactam   05/20/24 Blood culture from Peripheral Venipuncture Klebsiella pneumoniae/variicola  S  R  S  S    I  S  S    S   04/27/24 Fluid from Aspirate Klebsiella  pneumoniae/variicola S R S S   I S S   S   04/14/24 Urine from Clean Catch/Voided Escherichia coli S R I S S S S   S S      Collected Specimen Info Organism Trimethoprim/Sulfamethoxazole   05/20/24 Blood culture from Peripheral Venipuncture Klebsiella pneumoniae/variicola  S   04/27/24 Fluid from Aspirate Klebsiella pneumoniae/variicola S   04/14/24 Urine from Clean Catch/Voided Escherichia coli S                   X-rays/ Images     [unfilled]   Radiology Results (last 21 days)     Procedure Component Value Units Date/Time   XR chest 1 view [[id number]] Collected: 06/08/24 1816   Order Status: Completed Updated: 06/08/24 1816   Narrative:     Interpreted By:  Josefa Reyna,  STUDY:  XR CHEST 1 VIEW; ;  6/8/2024 4:44 pm      INDICATION:  Signs/Symptoms:Afib, POD2 s/p lap chidi.      COMPARISON:  05/19/2024      ACCESSION NUMBER(S):  [id number]      ORDERING CLINICIAN:  ANNIE HENRY      TECHNIQUE:  Portable chest      FINDINGS:  Tunneled right chest dual lumen catheter unchanged, distal tip  projects in the right atrium. Right hemidiaphragm elevation is again  noted. Patchy left basal consolidation is increased. Increased  perihilar interstitial opacities. Probable small pleural effusions.  No pneumothorax. Unchanged cardiomediastinal silhouette. No acute  osseous finding.       Impression:     Probable interstitial edema/venous congestion and small pleural  effusions. Increased left basal opacities may be atelectasis or  pneumonia.          Signed by: Josefa Reyna 6/8/2024 6:15 PM  Dictation workstation:   [id number]   US gallbladder [[id number]] Collected: 05/19/24 1643   Order Status: Completed Updated: 05/19/24 1738   Narrative:     STUDY:  Right upper quadrant Ultrasound; 5/19/2024 4:28 PM  INDICATION:  Status post cholecystostomy, elevated LFTs.  COMPARISON:  CT A&P today, US gallbladder 4/18/2024, MR abdomen 4/18/2024.  ACCESSION NUMBER(S):  [id number]  ORDERING CLINICIAN:  CELIA LEÓN  WYNNE  TECHNIQUE:  Ultrasound of the right upper quadrant.  Multiple images of the right  upper quadrant were obtained.  FINDINGS:  The liver demonstrates normal echogenicity.       The gallbladder contains sludge as well as multiple stones.  There is  edematous wall thickening.  The cholecystectomy tube is visualized  within the gallbladder.  Sonographic Luna's sign is negative.       The common bile duct measures 0.3 cm.  There is no intrahepatic  biliary dilatation.       The pancreas is not well seen due to overlying bowel gas.     The right kidney measures 9.8 cm in length.  Renal cortical thinning  is present and echotexture is increased.  There is no hydronephrosis.  There are no stones.  There is a simple cyst measuring 1.2 x 1.0 x 0.8  cm within the midportion.   Impression:     Sludge and stones within the gallbladder with edematous wall  thickening.  No biliary dilatation is appreciated.  Increased echogenicity and thinning of the right renal cortex  consistent with intrinsic renal disease.  No hydronephrosis.  Signed by Lonnie Rodriguez MD                  Medical Problems         Problem List         * (Principal) Chronic cholecystitis     Lumbago     A-fib (Multi)     Anxiety     Chronic diastolic heart failure of unknown etiology (Multi) (Chronic)     Cervical spondylosis without myelopathy     Coronary artery disease     Degeneration of intervertebral disc of lumbar region     Hypertension, essential (Chronic)     Frequent falls     GERD (gastroesophageal reflux disease) (Chronic)     Hyperlipidemia (Chronic)     Inability to ambulate due to multiple joints     Jerking movements of extremities     Spinal stenosis of lumbar region     Lumbar spondylosis     Macrocytic anemia     Myelodysplastic syndrome (Multi) (Chronic)     Myofascial pain syndrome     Occasional tremors     Osteoporosis     Stage 4 chronic kidney disease (Multi) (Chronic)     Weakness generalized     Gout     Scoliosis of lumbar  region due to degenerative disease of spine in adult     Depression     Lower extremity edema     Diabetes mellitus with complication (Multi)     COPD without exacerbation (Multi) (Chronic)     Primary osteoarthritis of right knee     Fall     Acute kidney failure, unspecified (CMS-[facility])     Muscle weakness (generalized)     Primary osteoarthritis     Repeated falls     Paroxysmal atrial fibrillation (Multi)     Chronic diastolic (congestive) heart failure (Multi)     Chronic obstructive pulmonary disease, unspecified (Multi)     CKD (chronic kidney disease)     Normocytic anemia     Essential (primary) hypertension     HLD (hyperlipidemia)     Type 2 diabetes mellitus with hyperglycemia, without long-term current use of insulin (Multi)     Type 2 diabetes mellitus without complications (Multi)     Obesity (BMI 30-39.9)     Cellulitis of toe of right foot     Acute kidney injury (nontraumatic) (CMS-[facility])     ESRD (end stage renal disease) on dialysis (Multi) (Chronic)     Anemia due to chronic kidney disease, on chronic dialysis (Multi) (Chronic)     Thickening of wall of gallbladder     Allergy, unspecified, sequela     Anaphylactic shock, unspecified, sequela     Articular cartilage disorder of hip     Bone marrow disorder     Coagulation defect, unspecified (Multi)     Contact with and (suspected) exposure to covid-19     Fracture of bone of hip (Multi)     History of anemia     Hyperkalemia     Pain of lower extremity     Pain, unspecified     Plantar fasciitis     Pneumonia due to infectious organism     Chest pain     Acute pulmonary edema (Multi)     Elevated bilirubin     Atrial fibrillation with rapid ventricular response (Multi)                  Above medical problems may be reflective of historical medical problems that may have resolved and may not related to acute clinical condition/medical problems.     Clinical impression/plan:        Atrial Fibrillation with RVR   -Likely multifactorial in acute on  chronic anemia, stress from surgical intervention, electrolyte derangements (hypomagnesemia has improved, hypophosphatemia on most recent labs), etc  -Overall patient feels weak, has continued abdominal discomfort from her surgery, and poor appetite; however, she is not endorsing any chest pain, palpitations, dizziness or lightheadedness, diaphoresis, or symptoms consistent with her A-fib as she has had these since her postoperative period began  -Patient will be continued on a Cardizem drip (most recent echocardiogram without evidence of LV dysfunction or WMAs), can continue home medications as p.o. intake improves.  May utilize pushes of IV metoprolol as needed if HR remains elevated despite cardizem   -Continue to trend electrolytes closely, monitor on telemetry.  -Will check TSH in the morning as well out of abundance of caution  -Patient is not on oral anticoagulation for A-fib in setting of her chronic anemia and need for blood transfusions  -Cardiology and critical care also consulted on this patient     Acute on chronic anemia, multifactorial in setting of baseline ESRD, MDS, recent surgery  -Patient's baseline hemoglobin varies and she does frequently require PRBC infusions and Procrit; most recently she was in the low sevens  -Earlier today, the patient's hemoglobin was 6.6 --> she went for dialysis and the plan per the surgery team was to recheck her H&H and transfuse as needed at a later time (given that she does get MARILYNN/PRBCs with dialysis); however, with interval development of A-fib RVR she was transfused 1 unit of blood  -There has been no overt evidence of bleeding per patient or nursing, including hemoptysis, hematemesis, melena, hematochezia, etc.  -Will continue to trend H&H  -Transfusion goal as per nephrology and cardiology, but would recommend trying to maintain at least 7.0 pending their input     Chronic cholecystitis s/p laparoscopic fenestrated subtotal chidi (06/06/24), ongoing abdominal  discomfort   -Patient is on a clear liquid diet but does not have much drive to have intake at this time due to ongoing abdominal discomfort  -She denies any specific nausea or vomiting  -She is utilizing incentive spirometry  -Majority of management including current antibiotics and nutrition as per primary service     HTN, HLD, CAD, Chronic HFpEF   -Blood pressure has been predominantly well-controlled throughout hospitalization  -As mentioned above, no current chest pain or anginal equivalents; is to be continued on home therapies  -Does not appear volume overloaded on examination currently, but does have some diminished breath sounds and CXR is showing evidence of some congestion.  Given diminished intake, will hold off on aggressive IV diuresis for therapies at this time, but moving forward may need to consider if volume status worsens versus adjusting volume status with HD  -Cardiology is following the patient as well     ESRD on HD  -Nephrology is following  -Continue home therapies as appropriate     COPD without acute exacerbation  -Patient is slightly diminished on auscultation, but not overtly wheezing and does have fair aeration  -Continue home therapies with caution in regard to albuterol usage with A-fib  -Continue with incentive spirometry, pulmonary hygiene, ambulation as tolerated     NIDDM-II   -Agree with mild SSI as appetite and intake improves  -Continue with hypoglycemic protocol, Accu-Cheks  -Monitor and adjust as needed     Anxiety and depression   -Continue home therapies      Electrolyte derangements: Hypomagnesemia, hypophosphatemia  -Hypomagnesemia: Patient did have a low mag level at 1.37; however, was replaced and did rise to 1.68.  With A-fib history, would start additional replacement with goal to be around the 2.0 range  -Hypophosphatemia: Phos 3.1 this morning, following dialysis and with repeat electrolytes did downtrend to 1.9.  Will order replacement and recheck. Note: as  ordering, order for replacement was just added. Repeat labs to follow.      Disposition/additional care plan/interventions: 6/9/2024        Tana Hargrove is a 79 y.o. female with a significant past medical history s/f HTN, HLD, CAD, paroxysmal atrial fibrillation (not on AC d/t anemia and need for recurrent blood transfusion), HFpEF,  NIDDM2, ESRD on HD, chronic anemia requiring Procrit and intermittent PRBC infusions (in setting of MDS and ESRD), OA, anxiety / depression, GERD, and chronic cholecystitis who presented 06/06/24 for laparoscopic fenestrated subtotal chidi I/s/o acute on chronic cholecystitis. Followed by surgery (primary) and nephrology (for HD). Medicine was consulted for perioperative medical management and assistance with management of atrial fibrillation with RVR, acute on chronic anemia. Cardiology  and Intensivist also consulted.     Continue current stewardship and postoperative care as per general surgery.     Patient cleared sinus rhythm overnight.     Continue renal replacement therapy as recommended by nephrology.     Continue to monitor H&H closely.     Reported COPD history deemed spurious by intensivist/pulmonologist: Outpatient follow-up with family physician pulmonary function testing may be of value to further evaluate diagnosis.     Electrolyte derangement improved continue to monitor magnesium.     Will continue rate control therapy for atrial fibrillation anticoagulation therapy recommended once okay with general surgery.      Disposition/additional care plan/interventions: 6/10/2024      BP slightly elevated at the time of evaluation likely transient in nature we will continue monitoring closely.     Physical therapy and Occupational Therapy as soon as okay with primary service/surgical service.     Continue to monitor for signs of bleeding, anticoagulation therapy needed currently on heparin drip.     Transition to oral Eliquis once okay with surgical service  Hospitalist  service will continue to follow while inpatient     Disposition/additional care plan/interventions: 6/12/2024     Blood pressure elevated this a.m., repeat BP post schedule a.m. antihypertensive therapy.     Consider escalating losartan dosage if BP continues to be elevated.     Renal replacement therapy as scheduled.      Patient remained in sinus rhythm, H&H appears stable, no overt signs of acute bleeding appreciated.     Continue stewardship and recommendation as per Cardiology     Close follow-up with cardiology outpatient recommended, patient warned.     Continue stewardship as per surgical/primary/admitting service.     Appreciate input and recommendation from other consultants.  Follow-up with hematology recommended.     Patient continue anticoagulation therapy, check CBC in the morning to monitor H&H.     Disposition/additional care plan/interventions: 6/13/2024    Continue supportive care, consider repeating H&H before discharge given anemia history and continue anticoagulation use.    Notified by dialysis nurse if elevated blood pressure and dialysis went directly to patient's bedside to evaluate patient patient asymptomatic repeat blood pressure systolic in the 170s in comparison to the 200s reported prior.  Discussed with nephrology, removal of 1.5 L will be recommended by nephrology.Will continue monitor closely.    Anticipate discharge to skilled nursing facility soon  authorization pending.      The patient was informed of differential diagnosis , work up , plan of care and possible sequelae of clinical disposition.Patient in agreement with plan of care. Further recommendations forthcoming in accordance with patient's clinical disposition and response to care.     Discharge planning:Discharge time in accordance recommendations by general surgery.Authorization pending to SNF.     Care time: < 55 mins.              Dictation performed with assistance of voice recognition device therefore transcription  errors are possible

## 2024-06-13 NOTE — CARE PLAN
The patient's goals for the shift include sleep.    The clinical goals for the shift include Patient will remain hemodynamically stable during the length of my shift.    Over the shift, the patient did make progress toward the following goals.     Problem: Skin  Goal: Decreased wound size/increased tissue granulation at next dressing change  Outcome: Progressing  Goal: Promote/optimize nutrition  Outcome: Progressing  Goal: Promote skin healing  Outcome: Progressing     Problem: Pain  Goal: Takes deep breaths with improved pain control throughout the shift  Outcome: Progressing  Goal: Turns in bed with improved pain control throughout the shift  Outcome: Progressing  Goal: Walks with improved pain control throughout the shift  Outcome: Progressing  Goal: Performs ADL's with improved pain control throughout shift  Outcome: Progressing  Goal: Participates in PT with improved pain control throughout the shift  Outcome: Progressing  Goal: Free from opioid side effects throughout the shift  Outcome: Progressing  Goal: Free from acute confusion related to pain meds throughout the shift  Outcome: Progressing     Problem: Respiratory  Goal: Clear secretions with interventions this shift  Outcome: Progressing  Goal: Minimize anxiety/maximize coping throughout shift  Outcome: Progressing  Goal: Minimal/no exertional discomfort or dyspnea this shift  Outcome: Progressing  Goal: No signs of respiratory distress (eg. Use of accessory muscles. Peds grunting)  Outcome: Progressing  Goal: Patent airway maintained this shift  Outcome: Progressing  Goal: Tolerate mechanical ventilation evidenced by VS/agitation level this shift  Outcome: Progressing  Goal: Verbalize decreased shortness of breath this shift  Outcome: Progressing  Goal: Increase self care and/or family involvement in next 24 hours  Outcome: Progressing     Problem: Pain - Adult  Goal: Verbalizes/displays adequate comfort level or baseline comfort level  Outcome:  Progressing     Problem: Discharge Planning  Goal: Discharge to home or other facility with appropriate resources  Outcome: Progressing     Problem: Chronic Conditions and Co-morbidities  Goal: Patient's chronic conditions and co-morbidity symptoms are monitored and maintained or improved  Outcome: Progressing     Problem: Diabetes  Goal: Achieve decreasing blood glucose levels by end of shift  Outcome: Progressing  Goal: Increase stability of blood glucose readings by end of shift  Outcome: Progressing  Goal: Maintain electrolyte levels within acceptable range throughout shift  Outcome: Progressing  Goal: Maintain glucose levels >70mg/dl to <250mg/dl throughout shift  Outcome: Progressing  Goal: No changes in neurological exam by end of shift  Outcome: Progressing  Goal: Learn about and adhere to nutrition recommendations by end of shift  Outcome: Progressing  Goal: Vital signs within normal range for age by end of shift  Outcome: Progressing  Goal: Increase self care and/or family involovement by end of shift  Outcome: Progressing  Goal: Receive DSME education by end of shift  Outcome: Progressing     Problem: Nutrition  Goal: Less than 5 days NPO/clear liquids  Outcome: Progressing  Goal: Consume prescribed supplement  Outcome: Progressing  Goal: BG  mg/dL  Outcome: Progressing  Goal: Promote healing  Outcome: Progressing  Goal: Maintain stable weight  Outcome: Progressing     Problem: Heart Failure  Goal: Improved gas exchange this shift  Outcome: Progressing  Goal: Improved urinary output this shift  Outcome: Progressing  Goal: Reduction in peripheral edema within 24 hours  Outcome: Progressing  Goal: Report improvement of dyspnea/breathlessness this shift  Outcome: Progressing  Goal: Weight from fluid excess reduced over 2-3 days, then stabilize  Outcome: Progressing  Goal: Increase self care and/or family involvement in 24 hours  Outcome: Progressing     Problem: Pain  Goal: My pain/discomfort is  manageable  Outcome: Progressing     Problem: Safety  Goal: I will remain free of falls  Outcome: Progressing     Problem: Daily Care  Goal: Daily care needs are met  Outcome: Progressing     Problem: Psychosocial Needs  Goal: Demonstrates ability to cope with hospitalization/illness  Outcome: Progressing  Goal: Collaborate with me, my family, and caregiver to identify my specific goals  Outcome: Progressing     Problem: Discharge Barriers  Goal: My discharge needs are met  Outcome: Progressing     Problem: Fall/Injury  Goal: Not fall by end of shift  Outcome: Progressing  Goal: Be free from injury by end of the shift  Outcome: Progressing  Goal: Use assistive devices by end of the shift  Outcome: Progressing  Goal: Pace activities to prevent fatigue by end of the shift  Outcome: Progressing

## 2024-06-13 NOTE — PROGRESS NOTES
Physical Therapy                 Therapy Communication Note    Patient Name: Tana Hargrove  MRN: [id number]  Today's Date: 6/13/2024     Discipline: Physical Therapy    Missed Visit Reason: Missed Visit Reason: Patient refused (AT THIS TIME, WAS IN HD EARLIER TODAY, POSSIBLE DISCH TODAY)    Missed Time: Attempt    Comment:

## 2024-06-13 NOTE — PROCEDURES
Report from Sending RN:    Report From: Chrissy Quiros RN  Recent Surgery of Procedure: Yes, Sunitha 05/06/2024  Baseline Level of Consciousness (LOC): x4  Oxygen Use: No  Type: RA  Diabetic: Yes  Last BP Med Given Day of Dialysis:   See EMAR  Last Pain Med Given: See EMAR  Lab Tests to be Obtained with Dialysis: No  Blood Transfusion to be Given During Dialysis: No  Available IV Access: Yes  Medications to be Administered During Dialysis: No  Continuous IV Infusion Running: No  Restraints on Currently or in the Last 24 Hours: No  Hand-Off Communication: Full Code and ready for HD  Dialysis Catheter Dressing: Will check prior to HD  Last Dressing Change: Will check prior to HD

## 2024-06-13 NOTE — DISCHARGE SUMMARY
Discharge Diagnosis  Chronic cholecystitis    Issues Requiring Follow-Up  Chronic cholecystitis  Afib w/ RVR  HTN  CKD    Test Results Pending At Discharge  Pending Labs       Order Current Status    Surgical Pathology Exam In process            Hospital Course   Pt arrived for an elective cholecystectomy, underwent a lap subtotal fenestrated cholecystectomy. She progressed post op. Her course was complicated by atrial fibrillation with RVR. She was started on eliquis for this after cardiology and hematology recommendations given her history of myelodysplastic syndrome. Cardiology, IMS, hematology, and nephrology followed her while she was here. Her coreg was increased to 25BID 2/2 her afib and HTN. She worked with PT/OT and was recommended SNF. She was discharged there in good condition on 6/13. On the day of discharge, she was ambulating appropriately for SNF, tolerating PO intake, and pain was well controlled. She has followup with her PCP and Dr. Grimes.    Pertinent Physical Exam At Time of Discharge  Gen: NAD.  A&Ox3  HEENT: NC/AT.  Moist mucous membranes.  Neck: Normal range of motion.  CV: Regular rate.  Chest: Normal chest rise.  Normal respiratory effort.  Abdomen: Soft.  NT/ND.  No rigidity or guarding. Incision c/d/I.  Extremities: No edema.  Moving all extremities.    Home Medications     Medication List      START taking these medications     acetaminophen 325 mg tablet; Commonly known as: Tylenol; Take 2 tablets   (650 mg) by mouth every 6 hours if needed for mild pain (1 - 3).   apixaban 2.5 mg tablet; Commonly known as: Eliquis; Take 1 tablet (2.5   mg) by mouth every 12 hours.   docusate sodium 100 mg capsule; Commonly known as: Colace; Take 1   capsule (100 mg) by mouth 2 times a day.   multivitamin with minerals tablet; Take 1 tablet by mouth once daily.   oxyCODONE 5 mg immediate release tablet; Commonly known as: Roxicodone;   Take 1 tablet (5 mg) by mouth every 6 hours if needed for severe  pain (7 -   10).     CHANGE how you take these medications     carvedilol 25 mg tablet; Commonly known as: Coreg; Take 1 tablet (25 mg)   by mouth 2 times a day.; What changed: medication strength, how much to   take     CONTINUE taking these medications     cholecalciferol 50 MCG (2000 UT) tablet; Commonly known as: Vitamin D-3   cyanocobalamin 1,000 mcg tablet; Commonly known as: Vitamin B-12   losartan 50 mg tablet; Commonly known as: Cozaar; Take 1 tablet (50 mg)   by mouth once daily.   pioglitazone 15 mg tablet; Commonly known as: Actos; Take 1 tablet (15   mg) by mouth once daily.   pravastatin 40 mg tablet; Commonly known as: Pravachol; Take 1 tablet   (40 mg) by mouth once daily at bedtime.   pregabalin 100 mg capsule; Commonly known as: Lyrica; TAKE ONE CAPSULE   BY MOUTH TWICE DAILY @9AM & 5PM   SITagliptin phosphate 25 mg tablet; Commonly known as: Januvia; Take 1   tablet (25 mg) by mouth once daily.     STOP taking these medications     amLODIPine 10 mg tablet; Commonly known as: Norvasc       Outpatient Follow-Up  Future Appointments   Date Time Provider Department [place]   6/19/2024  9:30 AM GAB Gill-CNP [id number] [facility]   6/26/2024  9:40 AM Carlos Higgins MD [id number] [facility]   7/1/2024 10:15 AM Ronald Grimes MD [id number] [facility]       Stanford Kohler DO

## 2024-06-13 NOTE — PROGRESS NOTES
Nutrition Progress Note: Follow-Up    79 y.o. female with PMHx s/f CAD, HTN, COPD, ESRD on HD via permacath, DM2, MDS, gout, neuropathy presenting for a fall.   Per surgery patient presenting with chronic cholecystitis managed with a percutaneous cholecystostomy tube here for definitive removal of her GB and drain.      6/13: Met with pt in dialysis. Reports good appetite and PO intake since last visit, and that she is receiving her supplements and consuming them. Pt reports expected discharge today. No questions r/t nutrition education or nutritional concerns expressed by pt at this time. Will continue to send supplements with meals and monitor intake while pt is admitted and follow per goal of care.     6/11:  Pt sitting up, reports finished breakfast with fair PO intake.  Pt drinking Ensure supplement during visit, and reports likes supplement drinks.  PO intake has been improving.  Patient forgot to eat lunch yesterday, but had breakfast/Dinner.     6/10:   Patient sitting up in chair, and son at bedside.  Pt with improving PO intake and appetite.  Diet was advanced on 6/8, but PO was started on 6/9.  Pt was able to eat [place] toast this morning, and agreeable to supplements with meals.  Pt continues on HD, and declined need for any further diet education.     6/7:  Pt is s/p partial fenestrated cholecystectomy, and was started on Clear liquids today.  Pt seen in ICU with some confusion, and s/p partial fenestrated cholecystectomy.  Pt tolerating sips of clears.  Pt agreeable to supplements as ordered.

## 2024-06-14 PROBLEM — R10.84 GENERALIZED ABDOMINAL PAIN: Status: ACTIVE | Noted: 2024-05-30

## 2024-06-14 PROBLEM — K80.01 CALCULUS OF GALLBLADDER WITH ACUTE CHOLECYSTITIS WITH OBSTRUCTION: Status: ACTIVE | Noted: 2024-05-30

## 2024-06-14 RX ORDER — SITAGLIPTIN 100 MG/1
1 TABLET, FILM COATED ORAL
COMMUNITY
Start: 2024-03-28

## 2024-06-17 ENCOUNTER — APPOINTMENT (OUTPATIENT)
Dept: HEMATOLOGY/ONCOLOGY | Facility: CLINIC | Age: 79
End: 2024-06-17
Payer: MEDICARE

## 2024-06-17 LAB
LABORATORY COMMENT REPORT: NORMAL
PATH REPORT.FINAL DX SPEC: NORMAL
PATH REPORT.GROSS SPEC: NORMAL
PATH REPORT.RELEVANT HX SPEC: NORMAL
PATH REPORT.TOTAL CANCER: NORMAL

## 2024-06-19 ENCOUNTER — APPOINTMENT (OUTPATIENT)
Dept: CARDIOLOGY | Facility: CLINIC | Age: 79
End: 2024-06-19
Payer: MEDICARE

## 2024-06-19 ENCOUNTER — OFFICE VISIT (OUTPATIENT)
Dept: CARDIOLOGY | Facility: CLINIC | Age: 79
End: 2024-06-19
Payer: MEDICARE

## 2024-06-19 VITALS
BODY MASS INDEX: 30.21 KG/M2 | SYSTOLIC BLOOD PRESSURE: 160 MMHG | HEIGHT: 61 IN | HEART RATE: 64 BPM | WEIGHT: 160 LBS | DIASTOLIC BLOOD PRESSURE: 64 MMHG

## 2024-06-19 DIAGNOSIS — I48.0 PAROXYSMAL ATRIAL FIBRILLATION (MULTI): Primary | ICD-10-CM

## 2024-06-19 DIAGNOSIS — I10 HYPERTENSION, ESSENTIAL: Chronic | ICD-10-CM

## 2024-06-19 PROCEDURE — 3078F DIAST BP <80 MM HG: CPT | Performed by: NURSE PRACTITIONER

## 2024-06-19 PROCEDURE — 1036F TOBACCO NON-USER: CPT | Performed by: NURSE PRACTITIONER

## 2024-06-19 PROCEDURE — 99214 OFFICE O/P EST MOD 30 MIN: CPT | Performed by: NURSE PRACTITIONER

## 2024-06-19 PROCEDURE — 1111F DSCHRG MED/CURRENT MED MERGE: CPT | Performed by: NURSE PRACTITIONER

## 2024-06-19 PROCEDURE — 1157F ADVNC CARE PLAN IN RCRD: CPT | Performed by: NURSE PRACTITIONER

## 2024-06-19 PROCEDURE — 3077F SYST BP >= 140 MM HG: CPT | Performed by: NURSE PRACTITIONER

## 2024-06-19 PROCEDURE — 1159F MED LIST DOCD IN RCRD: CPT | Performed by: NURSE PRACTITIONER

## 2024-06-19 ASSESSMENT — ENCOUNTER SYMPTOMS
CHILLS: 0
WHEEZING: 0
PALPITATIONS: 0
OCCASIONAL FEELINGS OF UNSTEADINESS: 0
DEPRESSION: 0
NEAR-SYNCOPE: 0
FEVER: 0
VOMITING: 0
NAUSEA: 0
HEMATOCHEZIA: 0
SHORTNESS OF BREATH: 0
ORTHOPNEA: 0
HEMATURIA: 0
COUGH: 0
DYSPNEA ON EXERTION: 0
LOSS OF SENSATION IN FEET: 0
ALTERED MENTAL STATUS: 0
IRREGULAR HEARTBEAT: 0
SYNCOPE: 0

## 2024-06-19 NOTE — PROGRESS NOTES
Chief Complaint/Reason for Visit:  Hospital Follow-up status post hospitalization cardiovascular follow up    History Of Present Illness:    Tana Hargrove is a 79 y.o. female that presents to the office for status posthospitalization follow up.    Taking medications as prescribed.     PMH significant for ESRD on HD.    She was admitted to [facility] 6/6/24-6/13/24 for chronic cholecystitis, atrial fibrillation with RVR, hypertension and CKD.  She was admitted after elective cholecystectomyAnd her course was complicated by atrial fibrillation with RVR.  She was started on apixabanAfter evaluation by hematology for myelodysplastic syndrome.  Her carvedilol was increased to 25 mg twice daily.    She is currently at rehab facility - [facility] in [place].  Denies any chest pain/pressure or SOB.       EKG personally reviewed today showed SR with HR 64 bpm.  This will go to the cardiologist for final review.     Past Medical History:  She has a past medical history of Abnormal levels of other serum enzymes, Acute kidney failure ([facility]), Anemia, CKD (chronic kidney disease), COPD (chronic obstructive pulmonary disease) (Multi), Coronary artery disease, Disease of blood and blood-forming organs, unspecified, HLD (hyperlipidemia), Hypertension, MDS (myelodysplastic syndrome) (Multi), Personal history of other diseases of the musculoskeletal system and connective tissue, Personal history of other specified conditions, Personal history of other specified conditions, and Type 2 diabetes mellitus (Multi).    She has no past medical history of Adverse effect of anesthesia, Arthritis, Asthma ([facility]), Awareness under anesthesia, CHF (congestive heart failure) (Multi), Delayed emergence from general anesthesia, Disease of thyroid gland, Hard to intubate, History of transfusion, Malignant hyperthermia, PONV (postoperative nausea and vomiting), Pseudocholinesterase deficiency, Spinal headache, or Stroke  (Multi).    Past Surgical History:  She has a past surgical history that includes Other surgical history (12/13/2019); Other surgical history (12/13/2019); Other surgical history (Bilateral, 12/13/2019); Other surgical history (Left, 12/13/2019); Other surgical history (12/13/2019); Other surgical history (12/13/2019); Other surgical history (Right, 12/13/2019); Other surgical history (04/16/2021); MR angio head wo IV contrast (11/20/2020); MR angio neck wo IV contrast (11/20/2020); Breast biopsy (Left); Hysterectomy; Breast lumpectomy; Appendectomy; Colonoscopy; Oophorectomy; and Joint replacement.      Social History:  She reports that she has never smoked. She has never used smokeless tobacco. She reports that she does not currently use alcohol. She reports that she does not use drugs.    Family History:  No family history on file.     Allergies:  Codeine, Hydrocodone, Hydrocodone-acetaminophen, Tramadol, Piperacillin-tazobactam, Adhesive tape-silicones, and Meperidine    Review of Systems   Constitutional: Negative for chills and fever.   Cardiovascular:  Negative for chest pain, dyspnea on exertion, irregular heartbeat, leg swelling, near-syncope, orthopnea, palpitations and syncope.   Respiratory:  Negative for cough, shortness of breath and wheezing.    Gastrointestinal:  Negative for hematochezia, melena, nausea and vomiting.   Genitourinary:  Negative for hematuria.   Psychiatric/Behavioral:  Negative for altered mental status.        Objective      Vitals reviewed.   Constitutional:       Appearance: Not in distress.   Neck:      Vascular: No carotid bruit.   Pulmonary:      Effort: Pulmonary effort is normal.      Breath sounds: Normal breath sounds.   Cardiovascular:      PMI at left midclavicular line. Normal rate. Regular rhythm. S1 with normal intensity. S2 with normal intensity.       Murmurs: There is no murmur.   Edema:     Peripheral edema absent.   Abdominal:      General: Bowel sounds are  normal.   Neurological:      Mental Status: Alert and oriented to person, place and time.         Current Outpatient Medications   Medication Instructions    acetaminophen (TYLENOL) 650 mg, oral, Every 6 hours PRN    carvedilol (COREG) 25 mg, oral, 2 times daily    cholecalciferol (Vitamin D-3) 50 MCG (2000 UT) tablet 1 tablet, oral, Daily    cyanocobalamin (Vitamin B-12) 1,000 mcg tablet 1 tablet, oral, Daily    docusate sodium (COLACE) 100 mg, oral, 2 times daily    Eliquis 2.5 mg, oral, Every 12 hours    Januvia 100 mg tablet 1 tablet, oral, Daily (0630)    losartan (COZAAR) 50 mg, oral, Daily    multivitamin with minerals tablet 1 tablet, oral, Daily    oxyCODONE (ROXICODONE) 5 mg, oral, Every 6 hours PRN    pioglitazone (ACTOS) 15 mg, oral, Daily    pravastatin (PRAVACHOL) 40 mg, oral, Nightly    pregabalin (Lyrica) 100 mg capsule TAKE ONE CAPSULE BY MOUTH TWICE DAILY @9AM & 5PM    SITagliptin phosphate (JANUVIA) 25 mg, oral, Daily        Last Labs:  CBC -  Lab Results   Component Value Date    WBC 4.2 (L) 06/11/2024    HGB 8.3 (L) 06/11/2024    HCT 25.2 (L) 06/11/2024    MCV 90 06/11/2024     06/11/2024       RENAL FUNCTION PANEL -   Lab Results   Component Value Date    GLUCOSE 94 06/11/2024     06/11/2024    K 3.8 06/11/2024     06/11/2024    CO2 27 06/11/2024    ANIONGAP 9 (L) 06/11/2024    BUN 29 (H) 06/11/2024    CREATININE 2.15 (H) 06/11/2024    CALCIUM 8.9 06/11/2024    PHOS 3.2 06/11/2024    ALBUMIN 2.9 (L) 06/10/2024        CMP -  Lab Results   Component Value Date    CALCIUM 8.9 06/11/2024    PHOS 3.2 06/11/2024    PROT 5.2 (L) 06/10/2024    ALBUMIN 2.9 (L) 06/10/2024    AST 12 06/10/2024    ALT 15 06/10/2024    ALKPHOS 81 06/10/2024    BILITOT 0.5 06/10/2024       LIPID PANEL -   Lab Results   Component Value Date    CHOL 117 05/19/2023    TRIG 75 05/19/2023    HDL 45.5 05/19/2023    CHHDL 2.6 05/19/2023    LDLF 57 05/19/2023    VLDL 15 05/19/2023     No results found for:  "\"LDLCALC\"    Lab Results   Component Value Date     (H) 04/22/2024    HGBA1C 6.3 (H) 03/14/2024    HGBA1C 6.6 (H) 02/26/2024       Lab Results   Component Value Date    TSH 1.91 06/09/2024       No results found for this or any previous visit.     Last Cardiology Tests:    Echo 2/15/24:   1. Left ventricular systolic function is normal with a 60% estimated ejection fraction.   2. Moderately increased left ventricular septal thickness.   3. Spectral Doppler shows an impaired relaxation pattern of left ventricular diastolic filling.   4. The left atrium is moderately dilated.   5. Mild to moderate tricuspid regurgitation.   6. Mildly elevated RVSP.   7. Aortic valve stenosis is not present.    Stress test 4/8/22:   1. No clinical or electrocardiographic evidence for ischemia at maximal infusion.   2. Nuclear image results are reported separately  1. There is a small fixed perfusion defect in the mid  anterior/anteroseptal wall with associated wall motion abnormality  consistent with prior infarct. There is a low probability of ischemia.     Visit Vitals  /64 (BP Location: Left arm)   Pulse 64   Ht 1.549 m (5' 1\")   Wt 72.6 kg (160 lb)   BMI 30.23 kg/m²   OB Status Hysterectomy   Smoking Status Never   BSA 1.77 m²       Assessment/Plan   There were no encounter diagnoses.    1. Paroxysmal atrial fibrillation  [id number] score is 4. (Hypertension - Yes, 1 point, Female - Yes, 1 point, and Age over 75 years - 2 points)  Continue apixaban 2.5 mg BID  Previously was not on any anticoagulation for pAF with h/o MDS and anemia, but was discharged from hospitalist on apixaban 2.5 mg BID after evaluation by hematology.  Continue carvedilol 25 mg BID  EKG today SR    2. HTN  Elevated  Continue carvedilol 25 mg BID, losartan 50 mg daily  She is newly on HD within the past 3 months.  If BP continues to be elevated, consider increasing dose of losartan.    3. ESRD on HD TTS  Management per nephrology   She goes to a " HD center in [place]    4. MDS; chronic anemia  Management per hematology    Nadya Hong, APRN-CNP

## 2024-06-19 NOTE — PATIENT INSTRUCTIONS
Recommend Mediterranean style of eating  Continue current medications  Follow-up with Dr. Humphrey Callaway in 3 months  If you have any questions or cardiac concerns, please call our office at [phone].

## 2024-06-24 ENCOUNTER — APPOINTMENT (OUTPATIENT)
Dept: HEMATOLOGY/ONCOLOGY | Facility: CLINIC | Age: 79
End: 2024-06-24
Payer: MEDICARE

## 2024-06-26 ENCOUNTER — APPOINTMENT (OUTPATIENT)
Dept: PRIMARY CARE | Facility: CLINIC | Age: 79
End: 2024-06-26
Payer: MEDICARE

## 2024-07-01 ENCOUNTER — APPOINTMENT (OUTPATIENT)
Dept: SURGERY | Facility: CLINIC | Age: 79
End: 2024-07-01
Payer: MEDICARE

## 2024-07-01 ENCOUNTER — APPOINTMENT (OUTPATIENT)
Dept: HEMATOLOGY/ONCOLOGY | Facility: CLINIC | Age: 79
End: 2024-07-01
Payer: MEDICARE

## 2024-07-01 VITALS
DIASTOLIC BLOOD PRESSURE: 74 MMHG | HEIGHT: 61 IN | HEART RATE: 65 BPM | SYSTOLIC BLOOD PRESSURE: 159 MMHG | WEIGHT: 160 LBS | OXYGEN SATURATION: 93 % | BODY MASS INDEX: 30.21 KG/M2

## 2024-07-01 DIAGNOSIS — Z90.49 S/P LAPAROSCOPIC CHOLECYSTECTOMY: Primary | ICD-10-CM

## 2024-07-01 DIAGNOSIS — K81.1 CHRONIC CHOLECYSTITIS: ICD-10-CM

## 2024-07-01 PROCEDURE — 1159F MED LIST DOCD IN RCRD: CPT | Performed by: SURGERY

## 2024-07-01 PROCEDURE — 3077F SYST BP >= 140 MM HG: CPT | Performed by: SURGERY

## 2024-07-01 PROCEDURE — 1036F TOBACCO NON-USER: CPT | Performed by: SURGERY

## 2024-07-01 PROCEDURE — 3078F DIAST BP <80 MM HG: CPT | Performed by: SURGERY

## 2024-07-01 PROCEDURE — 1157F ADVNC CARE PLAN IN RCRD: CPT | Performed by: SURGERY

## 2024-07-01 PROCEDURE — 1111F DSCHRG MED/CURRENT MED MERGE: CPT | Performed by: SURGERY

## 2024-07-01 PROCEDURE — 99024 POSTOP FOLLOW-UP VISIT: CPT | Performed by: SURGERY

## 2024-07-01 NOTE — PROGRESS NOTES
"Subjective   Patient ID: Tana Hargrove is a 79 y.o. female who presents for Follow-up (Patient is here for a 2 week POV of her partial cholecystectomy on 6/6/24. Patient denies redness, bruising or pain in the incision area. Patient is doing well overall. ).    HPI   underwent a lap KAREN and fenestrated subtotal cholecystectomy for chronic cholecystitis performed on 6th June 2024.  Her postoperative course was complicated by Afib and worsening hypertensive disease managed with eliquis and increasing her antihypertensives accordingly.  She was discharged to a SNF on 13th June 2024 and is seen here for the first post-operative visit.  She is still at the SNF and is scheduled to be discharged home this weekend.  She reports eating well with no issues with regards to nausea/vomiting.  She is having regular bowel movements and denies issues with dialysis.  She did, however, sustain a fall at the SNF and was worked up at an OSH which included a CT head which was negative, as per the patient's report.  She denies abdominal pain and is in good spirits to go home this weekend.        Review of Systems  Constitutional: Denies changes in weight, fatigue, night-sweats  HEENT: No changes in vision, nasal drainage, headache  CV: No palpitations, no chest pain, no lower extremity oedema  Resp: No wheezing, no cough, no shortness of breath  GI: No nausea, vomiting, diarrhoea, constipation  : No dysuria, no increased frequency  Neuro: No weakness, confusion, numbness, dizziness  MSK: No weakness, arthralgias, myalgias  Haem: No easy bruising, no easy bleeding  Skin: No new lesions or rashes  Endocrine: No polydipsia, polyuria, heat/cold insensitivity      Objective   /74   Pulse 65   Ht 1.549 m (5' 1\")   Wt 72.6 kg (160 lb)   SpO2 93%   BMI 30.23 kg/m²   Physical Exam  Vitals reviewed.   Cardiovascular:      Rate and Rhythm: Normal rate.      Pulses: Normal pulses.   Pulmonary:      Effort: Pulmonary effort is " normal.   Abdominal:      General: There is no distension.      Palpations: Abdomen is soft.      Tenderness: There is no abdominal tenderness.      Comments: Incisions all well-healed   Skin:     General: Skin is warm.   Neurological:      Mental Status: She is alert.   Psychiatric:         Mood and Affect: Mood normal.       Pathology:  Component    FINAL DIAGNOSIS   Portion of gallbladder, subtotal cholecystectomy:  - Portion of gallbladder with marked autolysis.  - Cholelithiasis.      Electronically signed by Ruthann Lopez MD on 6/17/2024 at 1159       Assessment/Plan   79F with MDS s/p lap KAREN and fenestrated subtotal cholecystectomy doing expectedly  - discussed pathology  Follow-up PRN  Problem List Items Addressed This Visit    None  Visit Diagnoses         Codes    S/P laparoscopic cholecystectomy    -  Primary Z90.49    Chronic cholecystitis     K81.1                 Ronald Grimes MD 07/01/24 10:15 AM

## 2024-07-10 ENCOUNTER — APPOINTMENT (OUTPATIENT)
Dept: PRIMARY CARE | Facility: CLINIC | Age: 79
End: 2024-07-10
Payer: MEDICARE

## 2024-07-15 ENCOUNTER — DOCUMENTATION (OUTPATIENT)
Dept: CARE COORDINATION | Facility: CLINIC | Age: 79
End: 2024-07-15
Payer: MEDICARE

## 2024-07-16 ENCOUNTER — PATIENT OUTREACH (OUTPATIENT)
Dept: CARE COORDINATION | Facility: CLINIC | Age: 79
End: 2024-07-16
Payer: MEDICARE

## 2024-07-16 NOTE — PROGRESS NOTES
Discharge Facility: [facility]  Discharge Diagnosis: Hereditary and idiopathic neuropathy, unspecified  Admission Date: 6/13/24  Discharge Date: 7/13/24    PCP Appointment Date: 7/24/24  Specialist Appointment Date: n/a  Hospital Encounter and Summary Linked: No    Two attempts were made to reach patient within two business days after discharge. Voicemail left with contact information for patient to call back with any non-emergent questions or concerns.

## 2024-07-24 ENCOUNTER — APPOINTMENT (OUTPATIENT)
Dept: PRIMARY CARE | Facility: CLINIC | Age: 79
End: 2024-07-24
Payer: MEDICARE

## 2024-07-29 ENCOUNTER — APPOINTMENT (OUTPATIENT)
Dept: PRIMARY CARE | Facility: CLINIC | Age: 79
End: 2024-07-29
Payer: MEDICARE

## 2024-07-29 VITALS
OXYGEN SATURATION: 95 % | DIASTOLIC BLOOD PRESSURE: 74 MMHG | HEART RATE: 51 BPM | TEMPERATURE: 97.5 F | WEIGHT: 157 LBS | BODY MASS INDEX: 29.64 KG/M2 | SYSTOLIC BLOOD PRESSURE: 152 MMHG | RESPIRATION RATE: 16 BRPM | HEIGHT: 61 IN

## 2024-07-29 DIAGNOSIS — Z79.899 ENCOUNTER FOR MEDICATION REVIEW: ICD-10-CM

## 2024-07-29 DIAGNOSIS — Z09 HOSPITAL DISCHARGE FOLLOW-UP: Primary | ICD-10-CM

## 2024-07-29 DIAGNOSIS — Z99.2 ESRD (END STAGE RENAL DISEASE) ON DIALYSIS (MULTI): ICD-10-CM

## 2024-07-29 DIAGNOSIS — R53.81 PHYSICAL DECONDITIONING: ICD-10-CM

## 2024-07-29 DIAGNOSIS — N18.6 ESRD (END STAGE RENAL DISEASE) ON DIALYSIS (MULTI): ICD-10-CM

## 2024-07-29 DIAGNOSIS — Z79.899 HIGH RISK MEDICATION USE: ICD-10-CM

## 2024-07-29 DIAGNOSIS — I48.91 ATRIAL FIBRILLATION, UNSPECIFIED TYPE (MULTI): ICD-10-CM

## 2024-07-29 PROCEDURE — 3078F DIAST BP <80 MM HG: CPT | Performed by: STUDENT IN AN ORGANIZED HEALTH CARE EDUCATION/TRAINING PROGRAM

## 2024-07-29 PROCEDURE — 3077F SYST BP >= 140 MM HG: CPT | Performed by: STUDENT IN AN ORGANIZED HEALTH CARE EDUCATION/TRAINING PROGRAM

## 2024-07-29 PROCEDURE — 1160F RVW MEDS BY RX/DR IN RCRD: CPT | Performed by: STUDENT IN AN ORGANIZED HEALTH CARE EDUCATION/TRAINING PROGRAM

## 2024-07-29 PROCEDURE — 1159F MED LIST DOCD IN RCRD: CPT | Performed by: STUDENT IN AN ORGANIZED HEALTH CARE EDUCATION/TRAINING PROGRAM

## 2024-07-29 PROCEDURE — 99215 OFFICE O/P EST HI 40 MIN: CPT | Performed by: STUDENT IN AN ORGANIZED HEALTH CARE EDUCATION/TRAINING PROGRAM

## 2024-07-29 PROCEDURE — 1157F ADVNC CARE PLAN IN RCRD: CPT | Performed by: STUDENT IN AN ORGANIZED HEALTH CARE EDUCATION/TRAINING PROGRAM

## 2024-07-29 RX ORDER — ATORVASTATIN CALCIUM 10 MG/1
10 TABLET, FILM COATED ORAL DAILY
COMMUNITY

## 2024-07-29 SDOH — ECONOMIC STABILITY: FOOD INSECURITY: WITHIN THE PAST 12 MONTHS, THE FOOD YOU BOUGHT JUST DIDN'T LAST AND YOU DIDN'T HAVE MONEY TO GET MORE.: NEVER TRUE

## 2024-07-29 SDOH — ECONOMIC STABILITY: FOOD INSECURITY: WITHIN THE PAST 12 MONTHS, YOU WORRIED THAT YOUR FOOD WOULD RUN OUT BEFORE YOU GOT MONEY TO BUY MORE.: NEVER TRUE

## 2024-07-29 ASSESSMENT — ENCOUNTER SYMPTOMS
DIARRHEA: 0
COLOR CHANGE: 0
WHEEZING: 0
UNEXPECTED WEIGHT CHANGE: 0
DIZZINESS: 0
FEVER: 0
SHORTNESS OF BREATH: 0
NAUSEA: 0
COUGH: 0
CHILLS: 0
MUSCULOSKELETAL NEGATIVE: 1
PALPITATIONS: 0
VOMITING: 0
CONFUSION: 0
CONSTIPATION: 0
ABDOMINAL PAIN: 0
FATIGUE: 0
HEADACHES: 0

## 2024-07-29 ASSESSMENT — LIFESTYLE VARIABLES
HOW OFTEN DO YOU HAVE A DRINK CONTAINING ALCOHOL: NEVER
HOW MANY STANDARD DRINKS CONTAINING ALCOHOL DO YOU HAVE ON A TYPICAL DAY: PATIENT DOES NOT DRINK
SKIP TO QUESTIONS 9-10: 1
AUDIT-C TOTAL SCORE: 0
HOW OFTEN DO YOU HAVE SIX OR MORE DRINKS ON ONE OCCASION: NEVER

## 2024-07-29 NOTE — PROGRESS NOTES
"  Subjective   Patient ID: Tana Hargrove is a 79 y.o. female who presents for Follow-up (Patient came home from nursing home with several medications and she isn't sure what she should be taking.  Patient is questioning the zinc, iron and stool softener.).    HPI   She is here for rehab discharge follow up. Late for TCM. She was initially in the  [facility] (06/6-06/13/24) for cholecystectomy for chronic cholecystitis.   Reports she is doing well, she was in the rehab 06/13/24-07/13/24 at [facility] post gall bladder surgery for physical deconditioning. Reports she was having difficulty with ambulation post surgery which has improved now; able to walk using walker now. She has ESRD and on HD (M, W & F) and its going alright. Reports she wants to make sure all her meds are appropriate and she has been taking all meds as provided by rehab and also from the hospital. She was recently started on AC, eliquis and Coreg while she was at the hospital and has been taking daily.      Review of Systems   Constitutional:  Negative for chills, fatigue, fever and unexpected weight change.   HENT: Negative.     Respiratory:  Negative for cough, shortness of breath and wheezing.    Cardiovascular:  Negative for chest pain, palpitations and leg swelling.   Gastrointestinal:  Negative for abdominal pain, constipation, diarrhea, nausea and vomiting.   Musculoskeletal: Negative.    Skin:  Negative for color change and rash.   Neurological:  Negative for dizziness and headaches.   Psychiatric/Behavioral:  Negative for behavioral problems and confusion.        Objective   /74 (BP Location: Right arm, Patient Position: Sitting, BP Cuff Size: Adult)   Pulse 51   Temp 36.4 °C (97.5 °F) (Temporal)   Resp 16   Ht 1.549 m (5' 1\")   Wt 71.2 kg (157 lb)   SpO2 95%   BMI 29.66 kg/m²     Physical Exam  Vitals and nursing note reviewed.   Constitutional:       Appearance: Normal appearance. She is not ill-appearing.      Comments: " Elderly female with walker.    Cardiovascular:      Rate and Rhythm: Normal rate and regular rhythm.      Pulses: Normal pulses.      Heart sounds: Normal heart sounds.   Pulmonary:      Effort: Pulmonary effort is normal.      Breath sounds: Normal breath sounds.   Abdominal:      General: Abdomen is flat. Bowel sounds are normal.      Palpations: Abdomen is soft.   Musculoskeletal:         General: Normal range of motion.   Neurological:      General: No focal deficit present.      Mental Status: She is alert.   Psychiatric:         Mood and Affect: Mood normal.         Behavior: Behavior normal.       Assessment/Plan   She is here for hospital discharge follow up. She was in the  [facility] (06/6-06/13/24) for cholecystectomy for chronic cholecystitis and then post op was at rehab ([facility]) 06/13/24-07/13/24 for physical deconditioning.  Appears she is doing much better, able to ambulate using walker now and abd pain has also resolved. Meds have been reconciled and is UTD on refills for now. She is clinically & vitally stable.   Problem List Items Addressed This Visit             ICD-10-CM    A-fib (Multi) I48.91    ESRD (end stage renal disease) on dialysis (Multi) (Chronic) N18.6, Z99.2     Other Visit Diagnoses         Codes    Hospital discharge follow-up    -  Primary Z09    Physical deconditioning     R53.81    Encounter for medication review     Z79.899    High risk medication use     Z79.899           Carlos Higgins MD    [facility], Family Medicine

## 2024-07-30 ENCOUNTER — APPOINTMENT (OUTPATIENT)
Dept: PRIMARY CARE | Facility: CLINIC | Age: 79
End: 2024-07-30
Payer: MEDICARE

## 2024-07-30 DIAGNOSIS — D46.9 MYELODYSPLASTIC SYNDROME (MULTI): Primary | ICD-10-CM

## 2024-07-30 RX ORDER — ALBUTEROL SULFATE 0.83 MG/ML
3 SOLUTION RESPIRATORY (INHALATION) AS NEEDED
OUTPATIENT
Start: 2024-08-05

## 2024-07-30 RX ORDER — EPINEPHRINE 0.3 MG/.3ML
0.3 INJECTION SUBCUTANEOUS EVERY 5 MIN PRN
OUTPATIENT
Start: 2024-08-05

## 2024-07-30 RX ORDER — FAMOTIDINE 10 MG/ML
20 INJECTION INTRAVENOUS ONCE AS NEEDED
OUTPATIENT
Start: 2024-08-05

## 2024-07-30 RX ORDER — DIPHENHYDRAMINE HYDROCHLORIDE 50 MG/ML
50 INJECTION INTRAMUSCULAR; INTRAVENOUS AS NEEDED
OUTPATIENT
Start: 2024-08-05

## 2024-08-05 ENCOUNTER — APPOINTMENT (OUTPATIENT)
Dept: HEMATOLOGY/ONCOLOGY | Facility: CLINIC | Age: 79
End: 2024-08-05
Payer: MEDICARE

## 2024-08-13 ENCOUNTER — PATIENT OUTREACH (OUTPATIENT)
Dept: CARE COORDINATION | Facility: CLINIC | Age: 79
End: 2024-08-13
Payer: MEDICARE

## 2024-08-13 NOTE — PROGRESS NOTES
Unable to reach patient for call back after patient's follow up appointment with PCP.   PARKERM with call back number for patient to call if needed   If no voicemail available call attempts x 2 were made to contact the patient to assist with any questions or concerns patient may have.

## 2024-08-23 ENCOUNTER — HOSPITAL ENCOUNTER (EMERGENCY)
Facility: HOSPITAL | Age: 79
Discharge: HOME | End: 2024-08-24
Attending: STUDENT IN AN ORGANIZED HEALTH CARE EDUCATION/TRAINING PROGRAM
Payer: MEDICARE

## 2024-08-23 DIAGNOSIS — D64.9 ANEMIA: Primary | ICD-10-CM

## 2024-08-23 LAB
ABO GROUP (TYPE) IN BLOOD: NORMAL
ALBUMIN SERPL BCP-MCNC: 4 G/DL (ref 3.4–5)
ALP SERPL-CCNC: 81 U/L (ref 33–136)
ALT SERPL W P-5'-P-CCNC: 12 U/L (ref 7–45)
ANION GAP SERPL CALC-SCNC: 10 MMOL/L (ref 10–20)
ANTIBODY SCREEN: NORMAL
APTT PPP: 31 SECONDS (ref 27–38)
AST SERPL W P-5'-P-CCNC: 15 U/L (ref 9–39)
BASO STIPL BLD QL SMEAR: PRESENT
BASOPHILS # BLD AUTO: 0.03 X10*3/UL (ref 0–0.1)
BASOPHILS NFR BLD AUTO: 0.6 %
BILIRUB SERPL-MCNC: 0.6 MG/DL (ref 0–1.2)
BUN SERPL-MCNC: 11 MG/DL (ref 6–23)
CALCIUM SERPL-MCNC: 8.4 MG/DL (ref 8.6–10.3)
CHLORIDE SERPL-SCNC: 97 MMOL/L (ref 98–107)
CO2 SERPL-SCNC: 33 MMOL/L (ref 21–32)
CREAT SERPL-MCNC: 1.38 MG/DL (ref 0.5–1.05)
DACRYOCYTES BLD QL SMEAR: NORMAL
EGFRCR SERPLBLD CKD-EPI 2021: 39 ML/MIN/1.73M*2
EOSINOPHIL # BLD AUTO: 0.16 X10*3/UL (ref 0–0.4)
EOSINOPHIL NFR BLD AUTO: 3.4 %
ERYTHROCYTE [DISTWIDTH] IN BLOOD BY AUTOMATED COUNT: 25.5 % (ref 11.5–14.5)
GLUCOSE SERPL-MCNC: 177 MG/DL (ref 74–99)
HCT VFR BLD AUTO: 17.3 % (ref 36–46)
HGB BLD-MCNC: 5.7 G/DL (ref 12–16)
HYPOCHROMIA BLD QL SMEAR: NORMAL
IMM GRANULOCYTES # BLD AUTO: 0.03 X10*3/UL (ref 0–0.5)
IMM GRANULOCYTES NFR BLD AUTO: 0.6 % (ref 0–0.9)
INR PPP: 1.2 (ref 0.9–1.1)
LYMPHOCYTES # BLD AUTO: 1.22 X10*3/UL (ref 0.8–3)
LYMPHOCYTES NFR BLD AUTO: 25.8 %
MCH RBC QN AUTO: 33.7 PG (ref 26–34)
MCHC RBC AUTO-ENTMCNC: 32.9 G/DL (ref 32–36)
MCV RBC AUTO: 102 FL (ref 80–100)
MONOCYTES # BLD AUTO: 0.42 X10*3/UL (ref 0.05–0.8)
MONOCYTES NFR BLD AUTO: 8.9 %
NEUTROPHILS # BLD AUTO: 2.87 X10*3/UL (ref 1.6–5.5)
NEUTROPHILS NFR BLD AUTO: 60.7 %
NRBC BLD-RTO: 0.4 /100 WBCS (ref 0–0)
OVALOCYTES BLD QL SMEAR: NORMAL
PLATELET # BLD AUTO: 239 X10*3/UL (ref 150–450)
POLYCHROMASIA BLD QL SMEAR: NORMAL
POTASSIUM SERPL-SCNC: 3.2 MMOL/L (ref 3.5–5.3)
PROT SERPL-MCNC: 6.7 G/DL (ref 6.4–8.2)
PROTHROMBIN TIME: 13.8 SECONDS (ref 9.8–12.8)
RBC # BLD AUTO: 1.69 X10*6/UL (ref 4–5.2)
RBC MORPH BLD: NORMAL
RH FACTOR (ANTIGEN D): NORMAL
SODIUM SERPL-SCNC: 137 MMOL/L (ref 136–145)
WBC # BLD AUTO: 4.7 X10*3/UL (ref 4.4–11.3)

## 2024-08-23 PROCEDURE — 86901 BLOOD TYPING SEROLOGIC RH(D): CPT

## 2024-08-23 PROCEDURE — 86922 COMPATIBILITY TEST ANTIGLOB: CPT

## 2024-08-23 PROCEDURE — 86902 BLOOD TYPE ANTIGEN DONOR EA: CPT

## 2024-08-23 PROCEDURE — 99285 EMERGENCY DEPT VISIT HI MDM: CPT | Mod: 25

## 2024-08-23 PROCEDURE — 36430 TRANSFUSION BLD/BLD COMPNT: CPT

## 2024-08-23 PROCEDURE — P9040 RBC LEUKOREDUCED IRRADIATED: HCPCS

## 2024-08-23 PROCEDURE — 80053 COMPREHEN METABOLIC PANEL: CPT

## 2024-08-23 PROCEDURE — 85610 PROTHROMBIN TIME: CPT

## 2024-08-23 PROCEDURE — 85025 COMPLETE CBC W/AUTO DIFF WBC: CPT

## 2024-08-23 PROCEDURE — 36415 COLL VENOUS BLD VENIPUNCTURE: CPT

## 2024-08-23 PROCEDURE — 85730 THROMBOPLASTIN TIME PARTIAL: CPT

## 2024-08-23 RX ORDER — ACETAMINOPHEN 325 MG/1
650 TABLET ORAL EVERY 4 HOURS PRN
Status: CANCELLED | OUTPATIENT
Start: 2024-08-23

## 2024-08-23 ASSESSMENT — LIFESTYLE VARIABLES
HAVE PEOPLE ANNOYED YOU BY CRITICIZING YOUR DRINKING: NO
HAVE YOU EVER FELT YOU SHOULD CUT DOWN ON YOUR DRINKING: NO
EVER FELT BAD OR GUILTY ABOUT YOUR DRINKING: NO
EVER HAD A DRINK FIRST THING IN THE MORNING TO STEADY YOUR NERVES TO GET RID OF A HANGOVER: NO
TOTAL SCORE: 0

## 2024-08-23 ASSESSMENT — PAIN - FUNCTIONAL ASSESSMENT: PAIN_FUNCTIONAL_ASSESSMENT: 0-10

## 2024-08-23 ASSESSMENT — PAIN SCALES - GENERAL: PAINLEVEL_OUTOF10: 0 - NO PAIN

## 2024-08-24 VITALS
DIASTOLIC BLOOD PRESSURE: 72 MMHG | HEART RATE: 75 BPM | SYSTOLIC BLOOD PRESSURE: 175 MMHG | OXYGEN SATURATION: 97 % | HEIGHT: 61 IN | TEMPERATURE: 99.9 F | BODY MASS INDEX: 27.94 KG/M2 | RESPIRATION RATE: 18 BRPM | WEIGHT: 148 LBS

## 2024-08-24 PROBLEM — D64.9 ANEMIA: Status: ACTIVE | Noted: 2024-08-24

## 2024-08-24 PROBLEM — E87.6 HYPOKALEMIA: Status: ACTIVE | Noted: 2024-08-24

## 2024-08-24 PROCEDURE — 99223 1ST HOSP IP/OBS HIGH 75: CPT

## 2024-08-24 PROCEDURE — P9040 RBC LEUKOREDUCED IRRADIATED: HCPCS

## 2024-08-24 PROCEDURE — 36430 TRANSFUSION BLD/BLD COMPNT: CPT

## 2024-08-24 RX ORDER — POLYETHYLENE GLYCOL 3350 17 G/17G
17 POWDER, FOR SOLUTION ORAL DAILY
Status: DISCONTINUED | OUTPATIENT
Start: 2024-08-24 | End: 2024-08-24 | Stop reason: HOSPADM

## 2024-08-24 RX ORDER — BISACODYL 5 MG
10 TABLET, DELAYED RELEASE (ENTERIC COATED) ORAL DAILY PRN
Status: DISCONTINUED | OUTPATIENT
Start: 2024-08-24 | End: 2024-08-24 | Stop reason: HOSPADM

## 2024-08-24 RX ORDER — ATORVASTATIN CALCIUM 10 MG/1
10 TABLET, FILM COATED ORAL DAILY
Status: DISCONTINUED | OUTPATIENT
Start: 2024-08-24 | End: 2024-08-24 | Stop reason: HOSPADM

## 2024-08-24 RX ORDER — LOSARTAN POTASSIUM 50 MG/1
50 TABLET ORAL DAILY
Status: DISCONTINUED | OUTPATIENT
Start: 2024-08-24 | End: 2024-08-24 | Stop reason: HOSPADM

## 2024-08-24 RX ORDER — CARVEDILOL 6.25 MG/1
25 TABLET ORAL 2 TIMES DAILY
Status: DISCONTINUED | OUTPATIENT
Start: 2024-08-24 | End: 2024-08-24 | Stop reason: HOSPADM

## 2024-08-24 RX ORDER — INSULIN LISPRO 100 [IU]/ML
0-10 INJECTION, SOLUTION INTRAVENOUS; SUBCUTANEOUS
Status: DISCONTINUED | OUTPATIENT
Start: 2024-08-24 | End: 2024-08-24 | Stop reason: HOSPADM

## 2024-08-24 RX ORDER — PANTOPRAZOLE SODIUM 40 MG/1
40 TABLET, DELAYED RELEASE ORAL DAILY
Status: DISCONTINUED | OUTPATIENT
Start: 2024-08-24 | End: 2024-08-24 | Stop reason: HOSPADM

## 2024-08-24 RX ORDER — ONDANSETRON HYDROCHLORIDE 2 MG/ML
4 INJECTION, SOLUTION INTRAVENOUS EVERY 6 HOURS PRN
Status: DISCONTINUED | OUTPATIENT
Start: 2024-08-24 | End: 2024-08-24 | Stop reason: HOSPADM

## 2024-08-24 RX ORDER — ENOXAPARIN SODIUM 100 MG/ML
30 INJECTION SUBCUTANEOUS EVERY 24 HOURS
Status: DISCONTINUED | OUTPATIENT
Start: 2024-08-24 | End: 2024-08-24 | Stop reason: HOSPADM

## 2024-08-24 RX ORDER — TALC
3 POWDER (GRAM) TOPICAL NIGHTLY PRN
Status: DISCONTINUED | OUTPATIENT
Start: 2024-08-24 | End: 2024-08-24 | Stop reason: HOSPADM

## 2024-08-24 RX ORDER — GUAIFENESIN 600 MG/1
600 TABLET, EXTENDED RELEASE ORAL EVERY 12 HOURS PRN
Status: DISCONTINUED | OUTPATIENT
Start: 2024-08-24 | End: 2024-08-24 | Stop reason: HOSPADM

## 2024-08-24 RX ORDER — POTASSIUM CHLORIDE 750 MG/1
20 TABLET, FILM COATED, EXTENDED RELEASE ORAL ONCE
Status: DISCONTINUED | OUTPATIENT
Start: 2024-08-24 | End: 2024-08-24 | Stop reason: HOSPADM

## 2024-08-24 RX ORDER — PANTOPRAZOLE SODIUM 40 MG/10ML
40 INJECTION, POWDER, LYOPHILIZED, FOR SOLUTION INTRAVENOUS DAILY
Status: DISCONTINUED | OUTPATIENT
Start: 2024-08-24 | End: 2024-08-24 | Stop reason: HOSPADM

## 2024-08-24 ASSESSMENT — ENCOUNTER SYMPTOMS
COUGH: 0
FLANK PAIN: 0
FEVER: 0
HEADACHES: 0
NAUSEA: 0
DIARRHEA: 0
TREMORS: 0
CHEST TIGHTNESS: 0
PALPITATIONS: 0
WHEEZING: 0
FATIGUE: 0
JOINT SWELLING: 0
SHORTNESS OF BREATH: 0
BACK PAIN: 0
WEAKNESS: 1
ABDOMINAL PAIN: 0
ANAL BLEEDING: 0
BLOOD IN STOOL: 0
CHILLS: 0
CONSTIPATION: 0
VOMITING: 0
WOUND: 0
HEMATURIA: 0

## 2024-08-24 NOTE — ED PROVIDER NOTES
Chief Complaint   Patient presents with    low hemoglobin       79-year-old female arrives to the emergency department with a chief complaint of anemia.  The patient is an end-stage renal patient and has dialysis Monday Wednesday Friday, the patient states that on Wednesday her hemoglobin was 6.3, and was told that they were going to watch it.  Today they informed her her hemoglobin was 5.3, patient did receive a full dialysis today, and then was instructed to go to the emergency department.  The patient is on Eliquis, denies any rectal or urinary blood.  The patient is hemodynamically stable upon arrival, alert and oriented x 4, answers questions appropriately.  Patient walks with a rollator, states that she feels she is at her baseline, the patient does appear to be slightly pale diffusely, I am familiar with this patient from this emergency room.  Patient denies any new symptoms or complaints      History provided by:  Patient   used: No         PmHx, PsHx, Allergies, Family Hx, social Hx reviewed as documented    A complete 10 point review of systems was performed and is negative except for as mentioned in the HPI.    Physical Exam:    General: Patient is AAOx3, appears well developed, well nourished, is a good historian, answers questions appropriately    HEENT: head normocephalic, atraumatic, PERRLA, EOMs intact, oropharynx without erythema or exudate, buccal mucosa intact without lesions, TMs unremarkable, nose is patent bilateral    Neck: supple, full ROM, negative for lymphadenopathy, JVD, thyromegaly, tracheal deviation, nuccal rigidity    Pulmonary: CTAB, no accessory muscle use, able to speak full clear sentences    Cardiac: HRRR, no murmurs, rubs or gallops    GI: soft, non-tender, non-distended, BS + x 4, no masses or organomegaly, no guarding or CVA tenderness noted, negative franco's, mcburney's    Musculoskeletal: Ambulates with rollator at baseline, RANDLE, no joint effusions,  clubbing or edema noted    Skin: Diffusely pale, otherwise skin intact, no lesions or rashes noted, turgor is good.    Neuro: patient follow commands, cranial nerves 2-12 grossly intact, motor strengths 5/5 upper and lower extremities, DTR's and sensation are symmetrical. No focal deficits.    Rectal/: No urinary burning, urgency, change in frequency.  Patient has no rectal complaints        Medical Decision Making  This patient was seen, treated, and evaluated in conjunction with Dr. Susie Chapa    Primary consideration for this patient given her history would be an exacerbation of her chronic anemia.  Other consideration for the patient it would be a rectal bleed, hematuria, or other gross blood loss.  Patient adamantly denies any gross blood loss in her stool or urine.  Patient is at her baseline energy level according to the patient, stating that at times she may feel that she is more fatigued than others, however this is not uncommon for the patient.  Patient denies any chest pain or shortness of breath.  Diagnostic blood work will be used to further evaluate    Patient's hemoglobin and hematocrit was found to be 5.7/17.3.  2 units of packed red blood cells were placed for the patient, I personally received electronic consent for the patient after her verbalization of risks and/or benefits.    The blood bank called down and told the charge nurse that the patient has an irregular antibody and the blood could take up to 7 hours.  This was discussed with the patient as well as IMS, and an observation was discussed to have the patient transfused.    The blood was more readily available than first anticipated, and though the order was placed for the floor, the patient from the time they arrived is requesting discharge and continues to endorse being asymptomatic.  After speaking with the attending physician as well as the IMS attending, after the patient was assigned a room with admission orders placed, the  patient will receive her second unit of packed red blood cells and be discharged from the emergency department.      Patient is amenable to the plan of discharge as outlined above, all patient's questions pertaining to their ED course were answered in their entirety.  Strict return precautions were discussed with the patient and they verbalized understanding.  Further, it was made clear to the patient that from an emergent basis, all effort and testing was done to eliminate any imminent dangerous or potentially dangerous conditions of the patient however if their symptoms get much worse or feel life-threatening, they are to return to the emergency department or call 911 immediately.       Diagnoses as of 08/24/24 0055   Anemia       The patient has had the following imaging during this ER visit: VITAL SIGNS  WEIGH PATIENT  MEASURE HEIGHT  ACTIVITY  VITAL SIGNS  TELEMETRY MONITORING  PT EVAL AND TREAT  OT EVAL AND TREAT  IP CONSULT TO SOCIAL WORK  IP CONSULT TO RENAL CARE  IP CONSULT TO HEMATOLOGY-ONCOLOGY  ADULT DIET     Patient History   Past Medical History:   Diagnosis Date    Abnormal levels of other serum enzymes     Elevated liver enzymes    Acute kidney failure (CMS-HCC)     Anemia     CKD (chronic kidney disease)     COPD (chronic obstructive pulmonary disease) (Multi)     Coronary artery disease     Disease of blood and blood-forming organs, unspecified     Bone marrow disorder    HLD (hyperlipidemia)     Hypertension     MDS (myelodysplastic syndrome) (Multi)     Personal history of other diseases of the musculoskeletal system and connective tissue     History of muscle pain    Personal history of other specified conditions     History of insomnia    Personal history of other specified conditions     History of edema    Type 2 diabetes mellitus (Multi)      Past Surgical History:   Procedure Laterality Date    APPENDECTOMY      BREAST BIOPSY Left     left excisional    BREAST LUMPECTOMY       CHOLECYSTECTOMY  06/06/2024    partial cholecystectomy    COLONOSCOPY      HYSTERECTOMY      JOINT REPLACEMENT      MR HEAD ANGIO WO IV CONTRAST  11/20/2020    MR HEAD ANGIO WO IV CONTRAST 11/20/2020 [facility]ICAL LEGAC[facility]    MR NECK ANGIO WO IV CONTRAST  11/20/2020    MR NECK ANGIO WO IV CONTRAST 11/20/2020 [facility] CLINICAL LEGACY    OOPHORECTOMY      OTHER SURGICAL HISTORY  12/13/2019    Oophorectomy bilateral    OTHER SURGICAL HISTORY  12/13/2019    Tubal ligation    OTHER SURGICAL HISTORY Bilateral 12/13/2019    Knee replacement    OTHER SURGICAL HISTORY Left 12/13/2019    Shoulder surgery    OTHER SURGICAL HISTORY  12/13/2019    Hysterectomy    OTHER SURGICAL HISTORY  12/13/2019    Lumpectomy    OTHER SURGICAL HISTORY Right 12/13/2019    Foot surgery    OTHER SURGICAL HISTORY  04/16/2021    Back surgery     Family History   Problem Relation Name Age of Onset    Heart disease Mother      Heart attack Father      Hodgkin's lymphoma Son       Social History     Tobacco Use    Smoking status: Never    Smokeless tobacco: Never   Vaping Use    Vaping status: Never Used   Substance Use Topics    Alcohol use: Not Currently    Drug use: Never       ED Triage Vitals   Temperature Heart Rate Respirations BP   08/23/24 1709 08/23/24 1709 08/23/24 1709 08/23/24 2102   36.5 °C (97.7 °F) 76 18 130/52      Pulse Ox Temp Source Heart Rate Source Patient Position   08/23/24 1709 08/23/24 1709 08/23/24 1709 08/23/24 1709   97 % Temporal Monitor Sitting      BP Location FiO2 (%)     08/23/24 1709 --     Left arm        Vitals:    08/23/24 2102 08/23/24 2216 08/23/24 2231 08/23/24 2345   BP: 130/52 142/55  144/55   Pulse: 75 77 74 72   Resp: 20 20 17 (!) 21   Temp:  37.3 °C (99.2 °F) 37.3 °C (99.1 °F)    TempSrc:  Temporal Temporal    SpO2: 96% 96% 95% (!) 93%   Weight:       Height:                   GAB Hernandez-CNP  08/24/24 0056

## 2024-08-24 NOTE — H&P
[facility] - GENERAL MEDICINE HISTORY AND PHYSICAL    History Obtained From: Patient    History Of Present Illness:  Tana Hargrove is a 79 y.o. female with PMHx s/f ESRD on HD (MWF schedule, received her dialysis today), hypertension, hyperlipidemia, CAD, HFpEF, COPD (no baseline O2), NIDDM2, paroxysmal atrial fibrillation on eliquis, chronic anemia of ESRD and myelodysplastic syndrome requiring MARILYNN and intermittent PRBC infusions, osteoarthritis, anxiety, depression, GERD, and chronic cholecystitis presenting with generalized weakness. She states she doesn't feel like her usual self, and knows something is off. Feels weak with ambulation with her walker. No falls. No complaints of pain. Denies f/c/n/v, chest pain, sob, abd pain, changes in urinary symptoms, melena, hematochezia, hemoptysis, leg swelling, near syncope, syncope.     ED Course (Summary):   Vitals on presentation: 97.7F, 76 bpm, 18 RR, 130/52, 97% on RA  Labs: CMP-glucose 177, sodium 137, potassium 3.2, bicarb 33, BUN/creatinine 11/1.3, EGFR 39, calcium 8.4  Coagulation-INR 1.2, PT 13.8, APTT 31  CBC-leukocyte 4.7, hemoglobin 5.7, hematocrit 17.3  Imaging: None  Interventions: 2 units of PRBC, admission for further management    ED Course (From Provider):  Diagnoses as of 08/24/24 0048   Anemia     Relevant Results  Results for orders placed or performed during the hospital encounter of 08/23/24 (from the past 24 hour(s))   CBC and Auto Differential   Result Value Ref Range    WBC 4.7 4.4 - 11.3 x10*3/uL    nRBC 0.4 (H) 0.0 - 0.0 /100 WBCs    RBC 1.69 (L) 4.00 - 5.20 x10*6/uL    Hemoglobin 5.7 (LL) 12.0 - 16.0 g/dL    Hematocrit 17.3 (L) 36.0 - 46.0 %     (H) 80 - 100 fL    MCH 33.7 26.0 - 34.0 pg    MCHC 32.9 32.0 - 36.0 g/dL    RDW 25.5 (H) 11.5 - 14.5 %    Platelets 239 150 - 450 x10*3/uL    Neutrophils % 60.7 40.0 - 80.0 %    Immature Granulocytes %, Automated 0.6 0.0 - 0.9 %    Lymphocytes % 25.8 13.0 - 44.0 %    Monocytes %  8.9 2.0 - 10.0 %    Eosinophils % 3.4 0.0 - 6.0 %    Basophils % 0.6 0.0 - 2.0 %    Neutrophils Absolute 2.87 1.60 - 5.50 x10*3/uL    Immature Granulocytes Absolute, Automated 0.03 0.00 - 0.50 x10*3/uL    Lymphocytes Absolute 1.22 0.80 - 3.00 x10*3/uL    Monocytes Absolute 0.42 0.05 - 0.80 x10*3/uL    Eosinophils Absolute 0.16 0.00 - 0.40 x10*3/uL    Basophils Absolute 0.03 0.00 - 0.10 x10*3/uL   Comprehensive metabolic panel   Result Value Ref Range    Glucose 177 (H) 74 - 99 mg/dL    Sodium 137 136 - 145 mmol/L    Potassium 3.2 (L) 3.5 - 5.3 mmol/L    Chloride 97 (L) 98 - 107 mmol/L    Bicarbonate 33 (H) 21 - 32 mmol/L    Anion Gap 10 10 - 20 mmol/L    Urea Nitrogen 11 6 - 23 mg/dL    Creatinine 1.38 (H) 0.50 - 1.05 mg/dL    eGFR 39 (L) >60 mL/min/1.73m*2    Calcium 8.4 (L) 8.6 - 10.3 mg/dL    Albumin 4.0 3.4 - 5.0 g/dL    Alkaline Phosphatase 81 33 - 136 U/L    Total Protein 6.7 6.4 - 8.2 g/dL    AST 15 9 - 39 U/L    Bilirubin, Total 0.6 0.0 - 1.2 mg/dL    ALT 12 7 - 45 U/L   Type And Screen   Result Value Ref Range    ABO TYPE A     Rh TYPE POS     ANTIBODY SCREEN NEG    Protime-INR   Result Value Ref Range    Protime 13.8 (H) 9.8 - 12.8 seconds    INR 1.2 (H) 0.9 - 1.1   APTT   Result Value Ref Range    aPTT 31 27 - 38 seconds   Morphology   Result Value Ref Range    RBC Morphology See Below     Polychromasia Mild     Hypochromia Marked     Ovalocytes Few     Teardrop Cells Few     Basophilic Stippling Present    Prepare RBC: 2 Units   Result Value Ref Range    PRODUCT CODE V4411U55     Unit Number [id number]     Unit ABO A     Unit RH POS     XM INTEP COMP     Dispense Status XM     Blood Expiration Date 9/16/2024 11:59:00 PM EDT     PRODUCT BLOOD TYPE 6200     UNIT VOLUME 350     PRODUCT CODE S8473Q94     Unit Number [id number]     Unit ABO A     Unit RH NEG     XM INTEP COMP     Dispense Status TR     Blood Expiration Date 9/5/2024 11:59:00 PM EDT     PRODUCT BLOOD TYPE 0600     UNIT VOLUME 350       *Note: Due to a large number of results and/or encounters for the requested time period, some results have not been displayed. A complete set of results can be found in Results Review.      No results found.   Scheduled medications:  [Held by provider] apixaban, 2.5 mg, oral, q12h  atorvastatin, 10 mg, oral, Daily  carvedilol, 25 mg, oral, BID  enoxaparin, 30 mg, subcutaneous, q24h  insulin lispro, 0-10 Units, subcutaneous, Before meals & nightly  losartan, 50 mg, oral, Daily  pantoprazole, 40 mg, oral, Daily   Or  pantoprazole, 40 mg, intravenous, Daily  polyethylene glycol, 17 g, oral, Daily  potassium chloride CR, 20 mEq, oral, Once  pregabalin, 100 mg, oral, Daily      Continuous medications:     PRN medications:  PRN medications: bisacodyl, guaiFENesin, melatonin, ondansetron     Past Medical History  She has a past medical history of Abnormal levels of other serum enzymes, Acute kidney failure (CMS-[facility]), Anemia, CKD (chronic kidney disease), COPD (chronic obstructive pulmonary disease) (Multi), Coronary artery disease, Disease of blood and blood-forming organs, unspecified, HLD (hyperlipidemia), Hypertension, MDS (myelodysplastic syndrome) (Multi), Personal history of other diseases of the musculoskeletal system and connective tissue, Personal history of other specified conditions, Personal history of other specified conditions, and Type 2 diabetes mellitus (Multi).    She has no past medical history of Adverse effect of anesthesia, Arthritis, Asthma ([facility]), Awareness under anesthesia, CHF (congestive heart failure) (Multi), Delayed emergence from general anesthesia, Disease of thyroid gland, Hard to intubate, History of transfusion, Malignant hyperthermia, PONV (postoperative nausea and vomiting), Pseudocholinesterase deficiency, Spinal headache, or Stroke (Multi).    Surgical History  She has a past surgical history that includes Other surgical history (12/13/2019); Other surgical history  (12/13/2019); Other surgical history (Bilateral, 12/13/2019); Other surgical history (Left, 12/13/2019); Other surgical history (12/13/2019); Other surgical history (12/13/2019); Other surgical history (Right, 12/13/2019); Other surgical history (04/16/2021); MR angio head wo IV contrast (11/20/2020); MR angio neck wo IV contrast (11/20/2020); Breast biopsy (Left); Hysterectomy; Breast lumpectomy; Appendectomy; Colonoscopy; Oophorectomy; Joint replacement; and Cholecystectomy (06/06/2024).     Social History  She reports that she has never smoked. She has never used smokeless tobacco. She reports that she does not currently use alcohol. She reports that she does not use drugs.    Family History  Family History   Problem Relation Name Age of Onset    Heart disease Mother      Heart attack Father      Hodgkin's lymphoma Son         Allergies  Codeine, Hydrocodone, Hydrocodone-acetaminophen, Tramadol, Piperacillin-tazobactam, Adhesive tape-silicones, and Meperidine    Code Status  Full Code     Review of Systems   Constitutional:  Negative for chills, fatigue and fever.   Respiratory:  Negative for cough, chest tightness, shortness of breath and wheezing.    Cardiovascular:  Negative for chest pain, palpitations and leg swelling.   Gastrointestinal:  Negative for abdominal pain, anal bleeding, blood in stool, constipation, diarrhea, nausea and vomiting.   Genitourinary:  Negative for flank pain and hematuria.   Musculoskeletal:  Negative for back pain and joint swelling.   Skin:  Negative for rash and wound.   Neurological:  Positive for weakness. Negative for tremors, syncope and headaches.        Generalized weakness with ambulation       Last Recorded Vitals  /55   Pulse 72   Temp 37.3 °C (99.1 °F) (Temporal)   Resp (!) 21   Wt 67.1 kg (148 lb)   SpO2 (!) 93%      Physical Exam:  Vital signs and nursing notes reviewed.   Constitutional: Pleasant and cooperative. Laying in bed in no acute distress.  Conversant.   Skin: Pale. Warm and dry; no obvious lesions, rashes, pallor, or jaundice. Good turgor.   Eyes: EOMI. Anicteric sclera.   ENT: Mucous membranes moist; no obvious injury or deformity appreciated.   Head and Neck: Normocephalic, atraumatic. ROM preserved. Trachea midline. No appreciable JVD.   Respiratory: Nonlabored on RA. Lungs clear to auscultation bilaterally without obvious adventitious sounds. Chest rise is equal.  Cardiovascular: RRR. No gross murmur, gallop, or rub. Extremities are warm and well-perfused with good capillary refill (< 3 seconds). No chest wall tenderness.   GI: Abdomen soft, nontender, nondistended. No obvious organomegaly appreciated. Bowel sounds are present and normoactive.  : No CVA tenderness.   MSK: No gross abnormalities appreciated. No limitations to AROM/PROM appreciated.   Extremities: No cyanosis, edema, or clubbing evident. Neurovascularly intact.   Neuro: A&Ox3. CN 2-12 grossly intact. Able to respond to questions appropriately and clearly. No acute focal neurologic deficits appreciated.  Psych: Appropriate mood and behavior.    Assessment/Plan   79 y.o. female with PMHx s/f ESRD on HD (MWF schedule, received her dialysis today), hypertension, hyperlipidemia, CAD, HFpEF, COPD (no baseline O2), NIDDM2, paroxysmal atrial fibrillation on eliquis, chronic anemia of ESRD and myelodysplastic syndrome requiring MARILYNN and intermittent PRBC infusions, osteoarthritis, anxiety, depression, GERD, and chronic cholecystitis presenting with generalized weakness.    Acute on chronic anemia of ESRD  -Hgb 5.7 at admission, received 2 PRBCs in ED  -denies acute blood loss, no melena, hematochezia, hemoptysis  -Recheck in a.m., continue to replenish as necessary    Hypokalemia  -Potassium 3.2 admission, potassium chloride 20 mEq oral ordered  -Recheck in a.m. and replenish as necessary    ESRD on HD (MWF schedule, received her dialysis today)  -Nephrology consult    HTN, HLD  -Continue  home medications   -Monitor and adjust as needed while admitted     NIDDM-II   -Hold home oral meds for now   -Continue with SSI ACHS   -Accucheks, hypoglycemic protocol   -Monitor and adjust as needed     Paroxysmal A-fib  -Continue on Coreg for rate control  -Hold Eliquis    Diet: Cardiac, consistent carb  DVT Prophylaxis: Hold Eliquis  Code Status: Full code     Jess Dodd PA-C    Dragon dictation software was used to dictate this note and thus there may be minor errors in translation/transcription including garbled speech or misspellings. Please contact for clarification if needed.

## 2024-08-24 NOTE — DISCHARGE INSTRUCTIONS
Please maintain your dialysis schedule and follow-up with your hematologist for your anemia.  If it anytime you notice any rectal or urinary bleeding please return to the emergency department

## 2024-08-26 ENCOUNTER — LAB REQUISITION (OUTPATIENT)
Dept: LAB | Facility: HOSPITAL | Age: 79
End: 2024-08-26
Payer: MEDICARE

## 2024-08-26 ENCOUNTER — APPOINTMENT (OUTPATIENT)
Dept: HEMATOLOGY/ONCOLOGY | Facility: CLINIC | Age: 79
End: 2024-08-26
Payer: MEDICARE

## 2024-08-26 DIAGNOSIS — N18.6 END STAGE RENAL DISEASE (MULTI): ICD-10-CM

## 2024-08-26 LAB
HCT VFR BLD AUTO: 22.5 % (ref 36–46)
HGB BLD-MCNC: 7.5 G/DL (ref 12–16)

## 2024-08-26 PROCEDURE — 85014 HEMATOCRIT: CPT

## 2024-08-26 PROCEDURE — 85018 HEMOGLOBIN: CPT

## 2024-08-29 DIAGNOSIS — I10 HYPERTENSION, ESSENTIAL: Chronic | ICD-10-CM

## 2024-08-29 RX ORDER — LOSARTAN POTASSIUM 50 MG/1
50 TABLET ORAL DAILY
Qty: 30 TABLET | Refills: 2 | Status: SHIPPED | OUTPATIENT
Start: 2024-08-29 | End: 2024-11-27

## 2024-09-04 ENCOUNTER — DOCUMENTATION (OUTPATIENT)
Dept: CARE COORDINATION | Facility: CLINIC | Age: 79
End: 2024-09-04
Payer: MEDICARE

## 2024-09-04 NOTE — PROGRESS NOTES
Attempted outreach for collaborative care rounds with insurance company, left a vm.  No further outreach at this time.  Paulette GAMBINON, RN, [facility]  O: [phone]

## 2024-09-06 DIAGNOSIS — I48.91 ATRIAL FIBRILLATION WITH RAPID VENTRICULAR RESPONSE (MULTI): ICD-10-CM

## 2024-09-06 RX ORDER — CARVEDILOL 25 MG/1
25 TABLET ORAL 2 TIMES DAILY
Qty: 60 TABLET | Refills: 1 | Status: SHIPPED | OUTPATIENT
Start: 2024-09-06 | End: 2024-11-05

## 2024-09-06 NOTE — TELEPHONE ENCOUNTER
Pt just got out of a nursing home and is out of the medication.   Did send refill to Dr Higgins (out of the office today 09/06/24)

## 2024-09-10 DIAGNOSIS — M79.18 MYOFASCIAL PAIN SYNDROME: ICD-10-CM

## 2024-09-12 RX ORDER — PREGABALIN 100 MG/1
CAPSULE ORAL
Qty: 60 CAPSULE | Refills: 0 | Status: SHIPPED | OUTPATIENT
Start: 2024-09-12 | End: 2024-09-18 | Stop reason: SDUPTHER

## 2024-09-16 ENCOUNTER — TELEPHONE (OUTPATIENT)
Dept: PRIMARY CARE | Facility: CLINIC | Age: 79
End: 2024-09-16
Payer: MEDICARE

## 2024-09-16 ENCOUNTER — APPOINTMENT (OUTPATIENT)
Dept: HEMATOLOGY/ONCOLOGY | Facility: CLINIC | Age: 79
End: 2024-09-16
Payer: MEDICARE

## 2024-09-16 ENCOUNTER — PHARMACY VISIT (OUTPATIENT)
Dept: PHARMACY | Facility: CLINIC | Age: 79
End: 2024-09-16
Payer: MEDICARE

## 2024-09-16 ENCOUNTER — HOSPITAL ENCOUNTER (EMERGENCY)
Facility: HOSPITAL | Age: 79
Discharge: HOME | End: 2024-09-16
Attending: STUDENT IN AN ORGANIZED HEALTH CARE EDUCATION/TRAINING PROGRAM
Payer: MEDICARE

## 2024-09-16 ENCOUNTER — APPOINTMENT (OUTPATIENT)
Dept: RADIOLOGY | Facility: HOSPITAL | Age: 79
End: 2024-09-16
Payer: MEDICARE

## 2024-09-16 ENCOUNTER — APPOINTMENT (OUTPATIENT)
Dept: CARDIOLOGY | Facility: HOSPITAL | Age: 79
End: 2024-09-16
Payer: MEDICARE

## 2024-09-16 VITALS
HEIGHT: 61 IN | RESPIRATION RATE: 16 BRPM | SYSTOLIC BLOOD PRESSURE: 175 MMHG | DIASTOLIC BLOOD PRESSURE: 80 MMHG | WEIGHT: 165 LBS | TEMPERATURE: 98.2 F | BODY MASS INDEX: 31.15 KG/M2 | OXYGEN SATURATION: 100 % | HEART RATE: 97 BPM

## 2024-09-16 DIAGNOSIS — N39.0 URINARY TRACT INFECTION WITHOUT HEMATURIA, SITE UNSPECIFIED: Primary | ICD-10-CM

## 2024-09-16 LAB
ALBUMIN SERPL BCP-MCNC: 4.1 G/DL (ref 3.4–5)
ALP SERPL-CCNC: 190 U/L (ref 33–136)
ALT SERPL W P-5'-P-CCNC: 42 U/L (ref 7–45)
ANION GAP SERPL CALC-SCNC: 11 MMOL/L (ref 10–20)
APPEARANCE UR: ABNORMAL
AST SERPL W P-5'-P-CCNC: 16 U/L (ref 9–39)
BACTERIA #/AREA URNS AUTO: ABNORMAL /HPF
BASOPHILS # BLD AUTO: 0.02 X10*3/UL (ref 0–0.1)
BASOPHILS NFR BLD AUTO: 0.5 %
BILIRUB SERPL-MCNC: 1 MG/DL (ref 0–1.2)
BILIRUB UR STRIP.AUTO-MCNC: NEGATIVE MG/DL
BUN SERPL-MCNC: 36 MG/DL (ref 6–23)
CALCIUM SERPL-MCNC: 9.5 MG/DL (ref 8.6–10.3)
CARDIAC TROPONIN I PNL SERPL HS: 14 NG/L (ref 0–13)
CARDIAC TROPONIN I PNL SERPL HS: 16 NG/L (ref 0–13)
CHLORIDE SERPL-SCNC: 103 MMOL/L (ref 98–107)
CO2 SERPL-SCNC: 26 MMOL/L (ref 21–32)
COLOR UR: YELLOW
CREAT SERPL-MCNC: 2.29 MG/DL (ref 0.5–1.05)
DACRYOCYTES BLD QL SMEAR: NORMAL
EGFRCR SERPLBLD CKD-EPI 2021: 21 ML/MIN/1.73M*2
EOSINOPHIL # BLD AUTO: 0.06 X10*3/UL (ref 0–0.4)
EOSINOPHIL NFR BLD AUTO: 1.4 %
ERYTHROCYTE [DISTWIDTH] IN BLOOD BY AUTOMATED COUNT: 22.6 % (ref 11.5–14.5)
FLUAV RNA RESP QL NAA+PROBE: NOT DETECTED
FLUBV RNA RESP QL NAA+PROBE: NOT DETECTED
GLUCOSE SERPL-MCNC: 144 MG/DL (ref 74–99)
GLUCOSE UR STRIP.AUTO-MCNC: NORMAL MG/DL
HCT VFR BLD AUTO: 25.3 % (ref 36–46)
HGB BLD-MCNC: 8.4 G/DL (ref 12–16)
HYALINE CASTS #/AREA URNS AUTO: ABNORMAL /LPF
IMM GRANULOCYTES # BLD AUTO: 0.02 X10*3/UL (ref 0–0.5)
IMM GRANULOCYTES NFR BLD AUTO: 0.5 % (ref 0–0.9)
KETONES UR STRIP.AUTO-MCNC: ABNORMAL MG/DL
LACTATE SERPL-SCNC: 0.9 MMOL/L (ref 0.4–2)
LEUKOCYTE ESTERASE UR QL STRIP.AUTO: ABNORMAL
LIPASE SERPL-CCNC: 35 U/L (ref 9–82)
LYMPHOCYTES # BLD AUTO: 0.91 X10*3/UL (ref 0.8–3)
LYMPHOCYTES NFR BLD AUTO: 20.8 %
MAGNESIUM SERPL-MCNC: 1.8 MG/DL (ref 1.6–2.4)
MCH RBC QN AUTO: 32.6 PG (ref 26–34)
MCHC RBC AUTO-ENTMCNC: 33.2 G/DL (ref 32–36)
MCV RBC AUTO: 98 FL (ref 80–100)
MONOCYTES # BLD AUTO: 0.39 X10*3/UL (ref 0.05–0.8)
MONOCYTES NFR BLD AUTO: 8.9 %
MUCOUS THREADS #/AREA URNS AUTO: ABNORMAL /LPF
NEUTROPHILS # BLD AUTO: 2.98 X10*3/UL (ref 1.6–5.5)
NEUTROPHILS NFR BLD AUTO: 67.9 %
NITRITE UR QL STRIP.AUTO: NEGATIVE
NRBC BLD-RTO: 0 /100 WBCS (ref 0–0)
OVALOCYTES BLD QL SMEAR: NORMAL
PH UR STRIP.AUTO: 6 [PH]
PLATELET # BLD AUTO: 227 X10*3/UL (ref 150–450)
POTASSIUM SERPL-SCNC: 3.8 MMOL/L (ref 3.5–5.3)
PROT SERPL-MCNC: 7.1 G/DL (ref 6.4–8.2)
PROT UR STRIP.AUTO-MCNC: ABNORMAL MG/DL
RBC # BLD AUTO: 2.58 X10*6/UL (ref 4–5.2)
RBC # UR STRIP.AUTO: ABNORMAL /UL
RBC #/AREA URNS AUTO: ABNORMAL /HPF
RBC MORPH BLD: NORMAL
SARS-COV-2 RNA RESP QL NAA+PROBE: NOT DETECTED
SODIUM SERPL-SCNC: 136 MMOL/L (ref 136–145)
SP GR UR STRIP.AUTO: 1.02
SQUAMOUS #/AREA URNS AUTO: ABNORMAL /HPF
UROBILINOGEN UR STRIP.AUTO-MCNC: ABNORMAL MG/DL
WBC # BLD AUTO: 4.4 X10*3/UL (ref 4.4–11.3)
WBC #/AREA URNS AUTO: ABNORMAL /HPF
WBC CLUMPS #/AREA URNS AUTO: ABNORMAL /HPF

## 2024-09-16 PROCEDURE — RXMED WILLOW AMBULATORY MEDICATION CHARGE

## 2024-09-16 PROCEDURE — 83735 ASSAY OF MAGNESIUM: CPT | Performed by: STUDENT IN AN ORGANIZED HEALTH CARE EDUCATION/TRAINING PROGRAM

## 2024-09-16 PROCEDURE — 81001 URINALYSIS AUTO W/SCOPE: CPT | Performed by: STUDENT IN AN ORGANIZED HEALTH CARE EDUCATION/TRAINING PROGRAM

## 2024-09-16 PROCEDURE — 93005 ELECTROCARDIOGRAM TRACING: CPT

## 2024-09-16 PROCEDURE — 36415 COLL VENOUS BLD VENIPUNCTURE: CPT | Performed by: STUDENT IN AN ORGANIZED HEALTH CARE EDUCATION/TRAINING PROGRAM

## 2024-09-16 PROCEDURE — 99283 EMERGENCY DEPT VISIT LOW MDM: CPT | Mod: 25

## 2024-09-16 PROCEDURE — 87636 SARSCOV2 & INF A&B AMP PRB: CPT | Performed by: STUDENT IN AN ORGANIZED HEALTH CARE EDUCATION/TRAINING PROGRAM

## 2024-09-16 PROCEDURE — 87086 URINE CULTURE/COLONY COUNT: CPT | Mod: PORLAB | Performed by: STUDENT IN AN ORGANIZED HEALTH CARE EDUCATION/TRAINING PROGRAM

## 2024-09-16 PROCEDURE — 83605 ASSAY OF LACTIC ACID: CPT | Performed by: STUDENT IN AN ORGANIZED HEALTH CARE EDUCATION/TRAINING PROGRAM

## 2024-09-16 PROCEDURE — 82435 ASSAY OF BLOOD CHLORIDE: CPT | Performed by: STUDENT IN AN ORGANIZED HEALTH CARE EDUCATION/TRAINING PROGRAM

## 2024-09-16 PROCEDURE — 84484 ASSAY OF TROPONIN QUANT: CPT | Performed by: STUDENT IN AN ORGANIZED HEALTH CARE EDUCATION/TRAINING PROGRAM

## 2024-09-16 PROCEDURE — 85025 COMPLETE CBC W/AUTO DIFF WBC: CPT | Performed by: STUDENT IN AN ORGANIZED HEALTH CARE EDUCATION/TRAINING PROGRAM

## 2024-09-16 PROCEDURE — 71046 X-RAY EXAM CHEST 2 VIEWS: CPT

## 2024-09-16 PROCEDURE — 83690 ASSAY OF LIPASE: CPT | Performed by: STUDENT IN AN ORGANIZED HEALTH CARE EDUCATION/TRAINING PROGRAM

## 2024-09-16 PROCEDURE — 71046 X-RAY EXAM CHEST 2 VIEWS: CPT | Performed by: RADIOLOGY

## 2024-09-16 RX ORDER — CEPHALEXIN 500 MG/1
500 CAPSULE ORAL 2 TIMES DAILY
Qty: 10 CAPSULE | Refills: 0 | Status: ON HOLD | OUTPATIENT
Start: 2024-09-16 | End: 2024-09-21

## 2024-09-16 ASSESSMENT — LIFESTYLE VARIABLES
EVER FELT BAD OR GUILTY ABOUT YOUR DRINKING: NO
HAVE PEOPLE ANNOYED YOU BY CRITICIZING YOUR DRINKING: NO
HAVE YOU EVER FELT YOU SHOULD CUT DOWN ON YOUR DRINKING: NO
EVER HAD A DRINK FIRST THING IN THE MORNING TO STEADY YOUR NERVES TO GET RID OF A HANGOVER: NO
TOTAL SCORE: 0

## 2024-09-16 ASSESSMENT — PAIN - FUNCTIONAL ASSESSMENT: PAIN_FUNCTIONAL_ASSESSMENT: 0-10

## 2024-09-16 ASSESSMENT — PAIN SCALES - GENERAL: PAINLEVEL_OUTOF10: 0 - NO PAIN

## 2024-09-16 NOTE — ED PROVIDER NOTES
HPI   Chief Complaint   Patient presents with    URI     Chills, sweats, tired X 4 days       This is an 79-year-old female with past medical history of A-fib with RVR on apixaban, myelodysplastic syndrome, hyperlipidemia, CAD, COPD, ESRD on hemodialysis Monday, Wednesday and Friday who presents to the emergency department for generalized weakness.  Patient states she been feeling unwell and sick for the past 3 days.  She is post get dialysis today but did not go.  She states she still makes urine and is having no urinary symptoms.  She is even feeling hot and cold.  She did have some abdominal cramping but she is no longer having any abdominal pain no nausea no vomiting or diarrhea.  She also denies coughing.  She has required blood transfusions in the past and is unsure if that is what is causing her symptoms.                          Tasneem Coma Scale Score: 15                  Patient History   Past Medical History:   Diagnosis Date    Abnormal levels of other serum enzymes     Elevated liver enzymes    Acute kidney failure (CMS-HCC)     Anemia     CKD (chronic kidney disease)     COPD (chronic obstructive pulmonary disease) (Multi)     Coronary artery disease     Disease of blood and blood-forming organs, unspecified     Bone marrow disorder    HLD (hyperlipidemia)     Hypertension     MDS (myelodysplastic syndrome) (Multi)     Personal history of other diseases of the musculoskeletal system and connective tissue     History of muscle pain    Personal history of other specified conditions     History of insomnia    Personal history of other specified conditions     History of edema    Type 2 diabetes mellitus (Multi)      Past Surgical History:   Procedure Laterality Date    APPENDECTOMY      BREAST BIOPSY Left     left excisional    BREAST LUMPECTOMY      CHOLECYSTECTOMY  06/06/2024    partial cholecystectomy    COLONOSCOPY      HYSTERECTOMY      JOINT REPLACEMENT      MR HEAD ANGIO WO IV CONTRAST  11/20/2020     MR HEAD ANGIO WO IV CONTRAST 11/20/2020 [facility]    MR NECK ANGIO WO IV CONTRAST  11/20/2020    MR NECK ANGIO WO IV CONTRAST 11/20/2020 [facility]    OOPHORECTOMY      OTHER SURGICAL HISTORY  12/13/2019    Oophorectomy bilateral    OTHER SURGICAL HISTORY  12/13/2019    Tubal ligation    OTHER SURGICAL HISTORY Bilateral 12/13/2019    Knee replacement    OTHER SURGICAL HISTORY Left 12/13/2019    Shoulder surgery    OTHER SURGICAL HISTORY  12/13/2019    Hysterectomy    OTHER SURGICAL HISTORY  12/13/2019    Lumpectomy    OTHER SURGICAL HISTORY Right 12/13/2019    Foot surgery    OTHER SURGICAL HISTORY  04/16/2021    Back surgery     Family History   Problem Relation Name Age of Onset    Heart disease Mother      Heart attack Father      Hodgkin's lymphoma Son       Social History     Tobacco Use    Smoking status: Never    Smokeless tobacco: Never   Vaping Use    Vaping status: Never Used   Substance Use Topics    Alcohol use: Not Currently    Drug use: Never       Physical Exam   ED Triage Vitals [09/16/24 1032]   Temperature Heart Rate Respirations BP   36.8 °C (98.2 °F) 97 16 175/80      Pulse Ox Temp Source Heart Rate Source Patient Position   100 % Temporal Monitor --      BP Location FiO2 (%)     -- --       Physical Exam  Constitutional:       General: She is not in acute distress.  HENT:      Head: Normocephalic and atraumatic.      Right Ear: Tympanic membrane normal.      Left Ear: Tympanic membrane normal.      Mouth/Throat:      Mouth: Mucous membranes are moist.   Eyes:      Extraocular Movements: Extraocular movements intact.      Conjunctiva/sclera: Conjunctivae normal.      Pupils: Pupils are equal, round, and reactive to light.   Cardiovascular:      Rate and Rhythm: Normal rate and regular rhythm.      Heart sounds: No murmur heard.  Pulmonary:      Effort: Pulmonary effort is normal. No respiratory distress.      Breath sounds: Normal breath sounds. No stridor. No wheezing or  rales.   Abdominal:      General: Bowel sounds are normal. There is no distension.      Tenderness: There is no abdominal tenderness. There is no guarding or rebound.   Musculoskeletal:         General: No swelling, tenderness or deformity. Normal range of motion.   Skin:     General: Skin is warm and dry.      Coloration: Skin is not jaundiced.      Findings: No bruising or lesion.   Neurological:      General: No focal deficit present.      Mental Status: She is alert and oriented to person, place, and time. Mental status is at baseline.      Cranial Nerves: No cranial nerve deficit.      Motor: No weakness.   Psychiatric:         Mood and Affect: Mood normal.       Labs Reviewed - No data to display  No orders to display       ED Course & MDM   ED Course as of 09/16/24 1514   Mon Sep 16, 2024   1312 IMPRESSION:  1.  No evidence of acute cardiopulmonary process.  2. Right IJ central venous line with its over the right atrium   [CF]      ED Course User Index  [CF] Sariah Benson MD         Diagnoses as of 09/16/24 1514   Urinary tract infection without hematuria, site unspecified       Medical Decision Making  This is an 79-year-old female who presents to the emergency department for concerns of generalized weakness and bodyaches.  Here patient exam is unremarkable no tenderness palpation of her abdomen.  EKG on my read shows sinus rhythm at a rate of 81 bpm no ST changes or elevations nonischemic EKG, QTc is 498 otherwise normal intervals.  Similar to her prior EKGs in the past.  Here her troponin is negative she continues to not have chest pain therefore less likely ACS.  Of note she is dialysis patient but she has no signs of pulmonary edema, pneumonia, electrolyte derangements.  Otherwise her creatinine seems to be around her baseline.  She does have a history of anemia but her hemoglobin of 8.4 COVID and flu are negative.    Patient does appear to potentially be intact infection we will give her Keflex.   Will have her follow-up with her primary care provider she is safe for discharge home.        Procedure  Procedures     Saraih Benson MD  09/16/24 3816

## 2024-09-16 NOTE — TELEPHONE ENCOUNTER
Agree that she should go to ER if she feels worse due to risk of electrolyte imbalance and dehydration that could be caused from profuse sweating. Tylenol and ibuprofen can be alternated for fevers.

## 2024-09-16 NOTE — TELEPHONE ENCOUNTER
Patient called with c/o fever and chills all weekend.  Patient states she is sweating profusely .  Pt has no cough, no sinus drainage.  Patient hasn't taken a covid test states she hasn't been out of the house.  Patient asked if she should be seen or go to ED.  Instructed patient if she felt worse she should seek ED.

## 2024-09-17 LAB — HOLD SPECIMEN: NORMAL

## 2024-09-18 ENCOUNTER — OFFICE VISIT (OUTPATIENT)
Dept: PRIMARY CARE | Facility: CLINIC | Age: 79
End: 2024-09-18
Payer: MEDICARE

## 2024-09-18 VITALS
WEIGHT: 147 LBS | SYSTOLIC BLOOD PRESSURE: 150 MMHG | BODY MASS INDEX: 27.75 KG/M2 | TEMPERATURE: 96.1 F | OXYGEN SATURATION: 96 % | DIASTOLIC BLOOD PRESSURE: 80 MMHG | HEART RATE: 83 BPM | RESPIRATION RATE: 20 BRPM | HEIGHT: 61 IN

## 2024-09-18 DIAGNOSIS — E11.9 TYPE 2 DIABETES MELLITUS WITHOUT COMPLICATION, WITHOUT LONG-TERM CURRENT USE OF INSULIN (MULTI): ICD-10-CM

## 2024-09-18 DIAGNOSIS — E78.5 HYPERLIPIDEMIA, UNSPECIFIED HYPERLIPIDEMIA TYPE: Primary | Chronic | ICD-10-CM

## 2024-09-18 DIAGNOSIS — M79.18 MYOFASCIAL PAIN SYNDROME: ICD-10-CM

## 2024-09-18 DIAGNOSIS — I48.91 ATRIAL FIBRILLATION WITH RAPID VENTRICULAR RESPONSE (MULTI): ICD-10-CM

## 2024-09-18 DIAGNOSIS — I10 HYPERTENSION, ESSENTIAL: Chronic | ICD-10-CM

## 2024-09-18 DIAGNOSIS — N39.0 COMPLICATED UTI (URINARY TRACT INFECTION): ICD-10-CM

## 2024-09-18 LAB — BACTERIA UR CULT: ABNORMAL

## 2024-09-18 PROCEDURE — 1159F MED LIST DOCD IN RCRD: CPT

## 2024-09-18 PROCEDURE — 3077F SYST BP >= 140 MM HG: CPT

## 2024-09-18 PROCEDURE — 1157F ADVNC CARE PLAN IN RCRD: CPT

## 2024-09-18 PROCEDURE — 1160F RVW MEDS BY RX/DR IN RCRD: CPT

## 2024-09-18 PROCEDURE — 1036F TOBACCO NON-USER: CPT

## 2024-09-18 PROCEDURE — 3079F DIAST BP 80-89 MM HG: CPT

## 2024-09-18 PROCEDURE — 1126F AMNT PAIN NOTED NONE PRSNT: CPT

## 2024-09-18 PROCEDURE — 99214 OFFICE O/P EST MOD 30 MIN: CPT

## 2024-09-18 RX ORDER — LEVOFLOXACIN 250 MG/1
250 TABLET ORAL DAILY
Qty: 10 TABLET | Refills: 0 | Status: ON HOLD | OUTPATIENT
Start: 2024-09-18 | End: 2024-09-28

## 2024-09-18 RX ORDER — ATORVASTATIN CALCIUM 10 MG/1
10 TABLET, FILM COATED ORAL DAILY
Qty: 30 TABLET | Refills: 2 | Status: ON HOLD | OUTPATIENT
Start: 2024-09-18

## 2024-09-18 RX ORDER — CARVEDILOL 25 MG/1
25 TABLET ORAL 2 TIMES DAILY
Qty: 60 TABLET | Refills: 1 | Status: ON HOLD | OUTPATIENT
Start: 2024-09-18 | End: 2024-11-17

## 2024-09-18 RX ORDER — PREGABALIN 100 MG/1
CAPSULE ORAL
Qty: 60 CAPSULE | Refills: 0 | Status: ON HOLD | OUTPATIENT
Start: 2024-09-18

## 2024-09-18 RX ORDER — LOSARTAN POTASSIUM 50 MG/1
50 TABLET ORAL DAILY
Qty: 30 TABLET | Refills: 2 | Status: ON HOLD | OUTPATIENT
Start: 2024-09-18 | End: 2024-12-17

## 2024-09-18 RX ORDER — PIOGLITAZONEHYDROCHLORIDE 15 MG/1
15 TABLET ORAL DAILY
Qty: 30 TABLET | Refills: 3 | Status: ON HOLD | OUTPATIENT
Start: 2024-09-18

## 2024-09-18 SDOH — ECONOMIC STABILITY: FOOD INSECURITY: WITHIN THE PAST 12 MONTHS, YOU WORRIED THAT YOUR FOOD WOULD RUN OUT BEFORE YOU GOT MONEY TO BUY MORE.: NEVER TRUE

## 2024-09-18 SDOH — ECONOMIC STABILITY: FOOD INSECURITY: WITHIN THE PAST 12 MONTHS, THE FOOD YOU BOUGHT JUST DIDN'T LAST AND YOU DIDN'T HAVE MONEY TO GET MORE.: NEVER TRUE

## 2024-09-18 ASSESSMENT — ANXIETY QUESTIONNAIRES
1. FEELING NERVOUS, ANXIOUS, OR ON EDGE: NOT AT ALL
IF YOU CHECKED OFF ANY PROBLEMS ON THIS QUESTIONNAIRE, HOW DIFFICULT HAVE THESE PROBLEMS MADE IT FOR YOU TO DO YOUR WORK, TAKE CARE OF THINGS AT HOME, OR GET ALONG WITH OTHER PEOPLE: NOT DIFFICULT AT ALL
GAD7 TOTAL SCORE: 0
4. TROUBLE RELAXING: NOT AT ALL
3. WORRYING TOO MUCH ABOUT DIFFERENT THINGS: NOT AT ALL
2. NOT BEING ABLE TO STOP OR CONTROL WORRYING: NOT AT ALL
7. FEELING AFRAID AS IF SOMETHING AWFUL MIGHT HAPPEN: NOT AT ALL
5. BEING SO RESTLESS THAT IT IS HARD TO SIT STILL: NOT AT ALL
6. BECOMING EASILY ANNOYED OR IRRITABLE: NOT AT ALL

## 2024-09-18 ASSESSMENT — ENCOUNTER SYMPTOMS
CARDIOVASCULAR NEGATIVE: 1
ENDOCRINE NEGATIVE: 1
PSYCHIATRIC NEGATIVE: 1
FEVER: 1
FATIGUE: 1
DYSURIA: 1
LOSS OF SENSATION IN FEET: 1
ALLERGIC/IMMUNOLOGIC NEGATIVE: 1
WEAKNESS: 1
CHILLS: 1
RESPIRATORY NEGATIVE: 1
DEPRESSION: 0
FLANK PAIN: 1
GASTROINTESTINAL NEGATIVE: 1
EYES NEGATIVE: 1
HEMATOLOGIC/LYMPHATIC NEGATIVE: 1
OCCASIONAL FEELINGS OF UNSTEADINESS: 0

## 2024-09-18 ASSESSMENT — PATIENT HEALTH QUESTIONNAIRE - PHQ9
1. LITTLE INTEREST OR PLEASURE IN DOING THINGS: NOT AT ALL
2. FEELING DOWN, DEPRESSED OR HOPELESS: NOT AT ALL
SUM OF ALL RESPONSES TO PHQ9 QUESTIONS 1 & 2: 0

## 2024-09-18 ASSESSMENT — LIFESTYLE VARIABLES
HOW OFTEN DO YOU HAVE A DRINK CONTAINING ALCOHOL: NEVER
SKIP TO QUESTIONS 9-10: 1
HOW OFTEN DO YOU HAVE SIX OR MORE DRINKS ON ONE OCCASION: NEVER
HOW MANY STANDARD DRINKS CONTAINING ALCOHOL DO YOU HAVE ON A TYPICAL DAY: PATIENT DOES NOT DRINK
AUDIT-C TOTAL SCORE: 0

## 2024-09-18 ASSESSMENT — PAIN SCALES - GENERAL: PAINLEVEL: 0-NO PAIN

## 2024-09-18 NOTE — PROGRESS NOTES
"Subjective   Patient ID: Tana Hargrove is a 79 y.o. female who presents for Follow-up (Pt was in emergency room on 09/16/24 for UTI.  Pt was given Keflex/Per pt \"Keflex isn't helping.  Has taken 5 pills and feels no different\" /Symptoms:  doesn't want to eat, fatigue,cold sweats, night chills, no diarrhea, lower back pain - since last Friday ( 09/13/24) - Pt went to Dialysis on Friday.  Didn't go on Monday or today.  ).    HPI   Tana presents for a complicated UTI. She states that she went to the ER on 9/16 and was started on Keflex and it is not helping, nor is it settling with her stomach. She is agreeable to switching antibiotics and coming in for a nurse visit on 9/23 to check urine again. Educated to go to ER if symptoms worsen or do not resolve, as she may need IV antibiotics.  She declined dicyclomine for pain.    Review of Systems   Constitutional:  Positive for chills, fatigue and fever.   HENT: Negative.     Eyes: Negative.    Respiratory: Negative.     Cardiovascular: Negative.    Gastrointestinal: Negative.    Endocrine: Negative.    Genitourinary:  Positive for dysuria and flank pain.   Skin: Negative.    Allergic/Immunologic: Negative.    Neurological:  Positive for weakness.   Hematological: Negative.    Psychiatric/Behavioral: Negative.         Objective   /80 (BP Location: Right arm, Patient Position: Sitting, BP Cuff Size: Adult)   Pulse 83   Temp 35.6 °C (96.1 °F)   Resp 20   Ht 1.549 m (5' 1\")   Wt 66.7 kg (147 lb)   SpO2 96%   BMI 27.78 kg/m²     Physical Exam  Cardiovascular:      Rate and Rhythm: Normal rate and regular rhythm.      Pulses: Normal pulses.      Heart sounds: Normal heart sounds.   Pulmonary:      Effort: Pulmonary effort is normal.      Breath sounds: Normal breath sounds.   Musculoskeletal:         General: Normal range of motion.   Skin:     General: Skin is warm and dry.      Capillary Refill: Capillary refill takes less than 2 seconds.   Neurological:      " Mental Status: She is oriented to person, place, and time.   Psychiatric:         Mood and Affect: Mood normal.         Behavior: Behavior normal.         Thought Content: Thought content normal.         Judgment: Judgment normal.         Assessment/Plan   Problem List Items Addressed This Visit             ICD-10-CM    Hypertension, essential (Chronic) I10    Relevant Medications    losartan (Cozaar) 50 mg tablet    Hyperlipidemia - Primary (Chronic) E78.5    Relevant Medications    atorvastatin (Lipitor) 10 mg tablet    Myofascial pain syndrome M79.18    Relevant Medications    pregabalin (Lyrica) 100 mg capsule    Type 2 diabetes mellitus without complications (Multi) E11.9    Relevant Medications    pioglitazone (Actos) 15 mg tablet    Atrial fibrillation with rapid ventricular response (Multi) I48.91    Relevant Medications    carvedilol (Coreg) 25 mg tablet     Other Visit Diagnoses         Codes    Complicated UTI (urinary tract infection)     N39.0    Relevant Medications    levoFLOXacin (Levaquin) 250 mg tablet

## 2024-09-19 ENCOUNTER — TELEPHONE (OUTPATIENT)
Dept: PHARMACY | Facility: HOSPITAL | Age: 79
End: 2024-09-19
Payer: MEDICARE

## 2024-09-19 NOTE — PROGRESS NOTES
EDPD Note: Antibiotics Reviewed and Warranted    Contacted Mr./Mrs./Ms. Tana Hargrove regarding a positive urine culture/result that was taken during their recent emergency room visit. I completed education with patient . The patient is being treated appropriately with Keflex 500 mg capsule bid for 5 days.       Patient was discharged on Keflex 500mg capsule bid for 5 days. Then at pcp visit patient was prescribed levofloxacin 250 mg one tab x 10 days. Patient has confirmed she stopped taking the previous antibiotic (keflex).  Patient was still experiencing chills, fatigue, weakness, dysuria, flank pain and fever at doctor visit. Levofloxacin does cover pathogen for UTI. levofloxacin dosing is appropriate for patient due to hemodialysis. Patient stated on the phone that this is her second day on levofloxacin and hasn't notice any improvement in symptoms. Writer urged her to seek medical care of her symptoms don't improve.          Susceptibility data from last 90 days.  Collected Specimen Info Organism Amoxicillin/Clavulanate Ampicillin Ampicillin/Sulbactam Cefazolin Cefazolin (uncomplicated UTIs only) Ciprofloxacin Gentamicin Nitrofurantoin Piperacillin/Tazobactam Trimethoprim/Sulfamethoxazole   09/16/24 Urine from Clean Catch/Voided Escherichia coli  S  R  I  S  S  S  S  S  S  S        No further follow up needed from EDPD Team.     YEE VELASCO

## 2024-09-20 ENCOUNTER — APPOINTMENT (OUTPATIENT)
Dept: RADIOLOGY | Facility: HOSPITAL | Age: 79
DRG: 689 | End: 2024-09-20
Payer: MEDICARE

## 2024-09-20 ENCOUNTER — APPOINTMENT (OUTPATIENT)
Dept: CARDIOLOGY | Facility: HOSPITAL | Age: 79
DRG: 689 | End: 2024-09-20
Payer: MEDICARE

## 2024-09-20 ENCOUNTER — HOSPITAL ENCOUNTER (INPATIENT)
Facility: HOSPITAL | Age: 79
DRG: 689 | End: 2024-09-20
Attending: EMERGENCY MEDICINE | Admitting: INTERNAL MEDICINE
Payer: MEDICARE

## 2024-09-20 DIAGNOSIS — N39.0 COMPLICATED UTI (URINARY TRACT INFECTION): ICD-10-CM

## 2024-09-20 DIAGNOSIS — N39.0 COMPLICATED URINARY TRACT INFECTION: ICD-10-CM

## 2024-09-20 DIAGNOSIS — N18.9 CHRONIC RENAL FAILURE, UNSPECIFIED CKD STAGE: ICD-10-CM

## 2024-09-20 DIAGNOSIS — R53.1 GENERALIZED WEAKNESS: Primary | ICD-10-CM

## 2024-09-20 LAB
ABO GROUP (TYPE) IN BLOOD: NORMAL
ALBUMIN SERPL BCP-MCNC: 3.9 G/DL (ref 3.4–5)
ALP SERPL-CCNC: 122 U/L (ref 33–136)
ALT SERPL W P-5'-P-CCNC: 18 U/L (ref 7–45)
ANION GAP SERPL CALC-SCNC: 10 MMOL/L (ref 10–20)
ANTIBODY SCREEN: NORMAL
APPEARANCE UR: CLEAR
AST SERPL W P-5'-P-CCNC: 11 U/L (ref 9–39)
ATRIAL RATE: 81 BPM
BACTERIA #/AREA URNS AUTO: ABNORMAL /HPF
BASOPHILS # BLD AUTO: 0.01 X10*3/UL (ref 0–0.1)
BASOPHILS NFR BLD AUTO: 0.3 %
BILIRUB SERPL-MCNC: 0.6 MG/DL (ref 0–1.2)
BILIRUB UR STRIP.AUTO-MCNC: NEGATIVE MG/DL
BLOOD EXPIRATION DATE: NORMAL
BUN SERPL-MCNC: 47 MG/DL (ref 6–23)
CALCIUM SERPL-MCNC: 9.4 MG/DL (ref 8.6–10.3)
CHLORIDE SERPL-SCNC: 106 MMOL/L (ref 98–107)
CO2 SERPL-SCNC: 26 MMOL/L (ref 21–32)
COLOR UR: ABNORMAL
CREAT SERPL-MCNC: 2.42 MG/DL (ref 0.5–1.05)
DACRYOCYTES BLD QL SMEAR: NORMAL
DISPENSE STATUS: NORMAL
EGFRCR SERPLBLD CKD-EPI 2021: 20 ML/MIN/1.73M*2
EOSINOPHIL # BLD AUTO: 0.08 X10*3/UL (ref 0–0.4)
EOSINOPHIL NFR BLD AUTO: 2.8 %
ERYTHROCYTE [DISTWIDTH] IN BLOOD BY AUTOMATED COUNT: 22.2 % (ref 11.5–14.5)
GLUCOSE BLD MANUAL STRIP-MCNC: 112 MG/DL (ref 74–99)
GLUCOSE BLD MANUAL STRIP-MCNC: 123 MG/DL (ref 74–99)
GLUCOSE SERPL-MCNC: 124 MG/DL (ref 74–99)
GLUCOSE UR STRIP.AUTO-MCNC: NORMAL MG/DL
HCT VFR BLD AUTO: 21.2 % (ref 36–46)
HGB BLD-MCNC: 7 G/DL (ref 12–16)
HYALINE CASTS #/AREA URNS AUTO: ABNORMAL /LPF
HYPOCHROMIA BLD QL SMEAR: NORMAL
IMM GRANULOCYTES # BLD AUTO: 0.01 X10*3/UL (ref 0–0.5)
IMM GRANULOCYTES NFR BLD AUTO: 0.3 % (ref 0–0.9)
KETONES UR STRIP.AUTO-MCNC: NEGATIVE MG/DL
LACTATE SERPL-SCNC: 0.7 MMOL/L (ref 0.4–2)
LEUKOCYTE ESTERASE UR QL STRIP.AUTO: NEGATIVE
LYMPHOCYTES # BLD AUTO: 1.12 X10*3/UL (ref 0.8–3)
LYMPHOCYTES NFR BLD AUTO: 38.8 %
MCH RBC QN AUTO: 32.4 PG (ref 26–34)
MCHC RBC AUTO-ENTMCNC: 33 G/DL (ref 32–36)
MCV RBC AUTO: 98 FL (ref 80–100)
MONOCYTES # BLD AUTO: 0.3 X10*3/UL (ref 0.05–0.8)
MONOCYTES NFR BLD AUTO: 10.4 %
NEUTROPHILS # BLD AUTO: 1.37 X10*3/UL (ref 1.6–5.5)
NEUTROPHILS NFR BLD AUTO: 47.4 %
NITRITE UR QL STRIP.AUTO: NEGATIVE
NRBC BLD-RTO: 0 /100 WBCS (ref 0–0)
OVALOCYTES BLD QL SMEAR: NORMAL
P AXIS: 62 DEGREES
PH UR STRIP.AUTO: 5.5 [PH]
PLATELET # BLD AUTO: 201 X10*3/UL (ref 150–450)
POTASSIUM SERPL-SCNC: 4.2 MMOL/L (ref 3.5–5.3)
PR INTERVAL: 157 MS
PRODUCT BLOOD TYPE: 600
PRODUCT CODE: NORMAL
PROT SERPL-MCNC: 6.4 G/DL (ref 6.4–8.2)
PROT UR STRIP.AUTO-MCNC: ABNORMAL MG/DL
Q ONSET: 251 MS
QRS COUNT: 13 BEATS
QRS DURATION: 83 MS
QT INTERVAL: 422 MS
QTC CALCULATION(BAZETT): 490 MS
QTC FREDERICIA: 466 MS
R AXIS: 6 DEGREES
RBC # BLD AUTO: 2.16 X10*6/UL (ref 4–5.2)
RBC # UR STRIP.AUTO: NEGATIVE /UL
RBC #/AREA URNS AUTO: ABNORMAL /HPF
RBC MORPH BLD: NORMAL
RH FACTOR (ANTIGEN D): NORMAL
SARS-COV-2 RNA RESP QL NAA+PROBE: NOT DETECTED
SODIUM SERPL-SCNC: 138 MMOL/L (ref 136–145)
SP GR UR STRIP.AUTO: 1.01
SQUAMOUS #/AREA URNS AUTO: ABNORMAL /HPF
T AXIS: 34 DEGREES
T OFFSET: 462 MS
UNIT ABO: NORMAL
UNIT NUMBER: NORMAL
UNIT RH: NORMAL
UNIT VOLUME: 284
UROBILINOGEN UR STRIP.AUTO-MCNC: NORMAL MG/DL
VENTRICULAR RATE: 81 BPM
WBC # BLD AUTO: 2.9 X10*3/UL (ref 4.4–11.3)
WBC #/AREA URNS AUTO: ABNORMAL /HPF
XM INTEP: NORMAL

## 2024-09-20 PROCEDURE — 1100000001 HC PRIVATE ROOM DAILY

## 2024-09-20 PROCEDURE — P9040 RBC LEUKOREDUCED IRRADIATED: HCPCS

## 2024-09-20 PROCEDURE — 36430 TRANSFUSION BLD/BLD COMPNT: CPT

## 2024-09-20 PROCEDURE — 36415 COLL VENOUS BLD VENIPUNCTURE: CPT | Performed by: EMERGENCY MEDICINE

## 2024-09-20 PROCEDURE — 74176 CT ABD & PELVIS W/O CONTRAST: CPT

## 2024-09-20 PROCEDURE — 86922 COMPATIBILITY TEST ANTIGLOB: CPT

## 2024-09-20 PROCEDURE — 71045 X-RAY EXAM CHEST 1 VIEW: CPT | Performed by: RADIOLOGY

## 2024-09-20 PROCEDURE — 2500000001 HC RX 250 WO HCPCS SELF ADMINISTERED DRUGS (ALT 637 FOR MEDICARE OP): Performed by: STUDENT IN AN ORGANIZED HEALTH CARE EDUCATION/TRAINING PROGRAM

## 2024-09-20 PROCEDURE — 86901 BLOOD TYPING SEROLOGIC RH(D): CPT

## 2024-09-20 PROCEDURE — 87086 URINE CULTURE/COLONY COUNT: CPT | Mod: PORLAB | Performed by: EMERGENCY MEDICINE

## 2024-09-20 PROCEDURE — 93005 ELECTROCARDIOGRAM TRACING: CPT

## 2024-09-20 PROCEDURE — 82947 ASSAY GLUCOSE BLOOD QUANT: CPT

## 2024-09-20 PROCEDURE — 81001 URINALYSIS AUTO W/SCOPE: CPT | Performed by: EMERGENCY MEDICINE

## 2024-09-20 PROCEDURE — 99223 1ST HOSP IP/OBS HIGH 75: CPT

## 2024-09-20 PROCEDURE — 74176 CT ABD & PELVIS W/O CONTRAST: CPT | Performed by: RADIOLOGY

## 2024-09-20 PROCEDURE — 87635 SARS-COV-2 COVID-19 AMP PRB: CPT | Performed by: EMERGENCY MEDICINE

## 2024-09-20 PROCEDURE — 99285 EMERGENCY DEPT VISIT HI MDM: CPT | Mod: 25

## 2024-09-20 PROCEDURE — 87075 CULTR BACTERIA EXCEPT BLOOD: CPT | Mod: PORLAB | Performed by: EMERGENCY MEDICINE

## 2024-09-20 PROCEDURE — 83605 ASSAY OF LACTIC ACID: CPT | Performed by: EMERGENCY MEDICINE

## 2024-09-20 PROCEDURE — 2500000001 HC RX 250 WO HCPCS SELF ADMINISTERED DRUGS (ALT 637 FOR MEDICARE OP)

## 2024-09-20 PROCEDURE — 80053 COMPREHEN METABOLIC PANEL: CPT | Performed by: EMERGENCY MEDICINE

## 2024-09-20 PROCEDURE — 85025 COMPLETE CBC W/AUTO DIFF WBC: CPT | Performed by: EMERGENCY MEDICINE

## 2024-09-20 PROCEDURE — 71045 X-RAY EXAM CHEST 1 VIEW: CPT

## 2024-09-20 RX ORDER — PRAVASTATIN SODIUM 40 MG/1
40 TABLET ORAL DAILY
COMMUNITY
End: 2024-09-23 | Stop reason: HOSPADM

## 2024-09-20 RX ORDER — DEXTROSE 50 % IN WATER (D50W) INTRAVENOUS SYRINGE
25
Status: DISCONTINUED | OUTPATIENT
Start: 2024-09-20 | End: 2024-09-23 | Stop reason: HOSPADM

## 2024-09-20 RX ORDER — LEVOFLOXACIN 500 MG/1
250 TABLET, FILM COATED ORAL
Status: DISCONTINUED | OUTPATIENT
Start: 2024-09-20 | End: 2024-09-23 | Stop reason: HOSPADM

## 2024-09-20 RX ORDER — CARVEDILOL 25 MG/1
25 TABLET ORAL 2 TIMES DAILY
Status: DISCONTINUED | OUTPATIENT
Start: 2024-09-20 | End: 2024-09-23 | Stop reason: HOSPADM

## 2024-09-20 RX ORDER — GUAIFENESIN 600 MG/1
600 TABLET, EXTENDED RELEASE ORAL EVERY 12 HOURS PRN
Status: DISCONTINUED | OUTPATIENT
Start: 2024-09-20 | End: 2024-09-23 | Stop reason: HOSPADM

## 2024-09-20 RX ORDER — DEXTROSE 50 % IN WATER (D50W) INTRAVENOUS SYRINGE
12.5
Status: DISCONTINUED | OUTPATIENT
Start: 2024-09-20 | End: 2024-09-23 | Stop reason: HOSPADM

## 2024-09-20 RX ORDER — TALC
3 POWDER (GRAM) TOPICAL NIGHTLY PRN
Status: DISCONTINUED | OUTPATIENT
Start: 2024-09-20 | End: 2024-09-23 | Stop reason: HOSPADM

## 2024-09-20 RX ORDER — LEVOFLOXACIN 250 MG/1
250 TABLET ORAL DAILY
Status: DISCONTINUED | OUTPATIENT
Start: 2024-09-20 | End: 2024-09-20

## 2024-09-20 RX ORDER — DOCUSATE SODIUM 100 MG/1
100 CAPSULE, LIQUID FILLED ORAL 2 TIMES DAILY
COMMUNITY

## 2024-09-20 RX ORDER — LOSARTAN POTASSIUM 50 MG/1
50 TABLET ORAL DAILY
Status: DISCONTINUED | OUTPATIENT
Start: 2024-09-20 | End: 2024-09-23 | Stop reason: HOSPADM

## 2024-09-20 RX ORDER — ATORVASTATIN CALCIUM 10 MG/1
10 TABLET, FILM COATED ORAL NIGHTLY
Status: DISCONTINUED | OUTPATIENT
Start: 2024-09-20 | End: 2024-09-23 | Stop reason: HOSPADM

## 2024-09-20 RX ORDER — PANTOPRAZOLE SODIUM 40 MG/10ML
40 INJECTION, POWDER, LYOPHILIZED, FOR SOLUTION INTRAVENOUS
Status: DISCONTINUED | OUTPATIENT
Start: 2024-09-21 | End: 2024-09-23 | Stop reason: HOSPADM

## 2024-09-20 RX ORDER — PANTOPRAZOLE SODIUM 40 MG/1
40 TABLET, DELAYED RELEASE ORAL
Status: DISCONTINUED | OUTPATIENT
Start: 2024-09-21 | End: 2024-09-23 | Stop reason: HOSPADM

## 2024-09-20 RX ORDER — ENOXAPARIN SODIUM 100 MG/ML
30 INJECTION SUBCUTANEOUS EVERY 24 HOURS
Status: DISCONTINUED | OUTPATIENT
Start: 2024-09-20 | End: 2024-09-20

## 2024-09-20 RX ORDER — DIPHENHYDRAMINE HCL 25 MG
25 CAPSULE ORAL EVERY 4 HOURS PRN
Status: DISCONTINUED | OUTPATIENT
Start: 2024-09-20 | End: 2024-09-23 | Stop reason: HOSPADM

## 2024-09-20 RX ORDER — POLYETHYLENE GLYCOL 3350 17 G/17G
17 POWDER, FOR SOLUTION ORAL DAILY
Status: DISCONTINUED | OUTPATIENT
Start: 2024-09-20 | End: 2024-09-23 | Stop reason: HOSPADM

## 2024-09-20 RX ORDER — LEVOFLOXACIN 500 MG/1
500 TABLET, FILM COATED ORAL
Status: DISCONTINUED | OUTPATIENT
Start: 2024-09-20 | End: 2024-09-20

## 2024-09-20 RX ORDER — INSULIN LISPRO 100 [IU]/ML
0-10 INJECTION, SOLUTION INTRAVENOUS; SUBCUTANEOUS
Status: DISCONTINUED | OUTPATIENT
Start: 2024-09-20 | End: 2024-09-23 | Stop reason: HOSPADM

## 2024-09-20 RX ORDER — BISACODYL 5 MG
10 TABLET, DELAYED RELEASE (ENTERIC COATED) ORAL DAILY PRN
Status: DISCONTINUED | OUTPATIENT
Start: 2024-09-20 | End: 2024-09-23 | Stop reason: HOSPADM

## 2024-09-20 RX ORDER — LEVOFLOXACIN 250 MG/1
250 TABLET ORAL
Status: DISCONTINUED | OUTPATIENT
Start: 2024-09-22 | End: 2024-09-20

## 2024-09-20 RX ORDER — SODIUM CHLORIDE, SODIUM LACTATE, POTASSIUM CHLORIDE, CALCIUM CHLORIDE 600; 310; 30; 20 MG/100ML; MG/100ML; MG/100ML; MG/100ML
75 INJECTION, SOLUTION INTRAVENOUS CONTINUOUS
Status: DISCONTINUED | OUTPATIENT
Start: 2024-09-20 | End: 2024-09-20

## 2024-09-20 RX ORDER — ONDANSETRON HYDROCHLORIDE 2 MG/ML
4 INJECTION, SOLUTION INTRAVENOUS EVERY 6 HOURS PRN
Status: DISCONTINUED | OUTPATIENT
Start: 2024-09-20 | End: 2024-09-23 | Stop reason: HOSPADM

## 2024-09-20 SDOH — SOCIAL STABILITY: SOCIAL INSECURITY: WERE YOU ABLE TO COMPLETE ALL THE BEHAVIORAL HEALTH SCREENINGS?: YES

## 2024-09-20 SDOH — SOCIAL STABILITY: SOCIAL INSECURITY: HAVE YOU HAD THOUGHTS OF HARMING ANYONE ELSE?: NO

## 2024-09-20 ASSESSMENT — LIFESTYLE VARIABLES
HOW OFTEN DO YOU HAVE A DRINK CONTAINING ALCOHOL: NEVER
HAVE PEOPLE ANNOYED YOU BY CRITICIZING YOUR DRINKING: NO
SKIP TO QUESTIONS 9-10: 1
TOTAL SCORE: 0
AUDIT-C TOTAL SCORE: 0
AUDIT-C TOTAL SCORE: 0
HOW OFTEN DO YOU HAVE 6 OR MORE DRINKS ON ONE OCCASION: NEVER
EVER FELT BAD OR GUILTY ABOUT YOUR DRINKING: NO
HAVE YOU EVER FELT YOU SHOULD CUT DOWN ON YOUR DRINKING: NO
SUBSTANCE_ABUSE_PAST_12_MONTHS: NO
EVER HAD A DRINK FIRST THING IN THE MORNING TO STEADY YOUR NERVES TO GET RID OF A HANGOVER: NO
HOW MANY STANDARD DRINKS CONTAINING ALCOHOL DO YOU HAVE ON A TYPICAL DAY: PATIENT DOES NOT DRINK
PRESCIPTION_ABUSE_PAST_12_MONTHS: NO

## 2024-09-20 ASSESSMENT — ENCOUNTER SYMPTOMS
ABDOMINAL PAIN: 0
FATIGUE: 0
WOUND: 0
APPETITE CHANGE: 1
PALPITATIONS: 0
DIZZINESS: 1
NAUSEA: 0
VOMITING: 0
FEVER: 0
FLANK PAIN: 0
WEAKNESS: 1
JOINT SWELLING: 0
DYSURIA: 0
BACK PAIN: 0
FREQUENCY: 0
HEMATURIA: 0
COUGH: 0
DIARRHEA: 0
CHEST TIGHTNESS: 0
SHORTNESS OF BREATH: 0
CHILLS: 0
HEADACHES: 0
WHEEZING: 0
TREMORS: 0
CONSTIPATION: 0

## 2024-09-20 ASSESSMENT — COGNITIVE AND FUNCTIONAL STATUS - GENERAL
WALKING IN HOSPITAL ROOM: A LITTLE
DAILY ACTIVITIY SCORE: 24
MOBILITY SCORE: 22
PATIENT BASELINE BEDBOUND: NO
CLIMB 3 TO 5 STEPS WITH RAILING: A LITTLE

## 2024-09-20 ASSESSMENT — ACTIVITIES OF DAILY LIVING (ADL)
LACK_OF_TRANSPORTATION: NO
HEARING - LEFT EAR: FUNCTIONAL
FEEDING YOURSELF: INDEPENDENT
DRESSING YOURSELF: INDEPENDENT
WALKS IN HOME: INDEPENDENT
JUDGMENT_ADEQUATE_SAFELY_COMPLETE_DAILY_ACTIVITIES: YES
PATIENT'S MEMORY ADEQUATE TO SAFELY COMPLETE DAILY ACTIVITIES?: YES
HEARING - RIGHT EAR: FUNCTIONAL
BATHING: INDEPENDENT
ASSISTIVE_DEVICE: DENTURES LOWER;DENTURES UPPER;WALKER
GROOMING: INDEPENDENT
TOILETING: INDEPENDENT
ADEQUATE_TO_COMPLETE_ADL: YES

## 2024-09-20 ASSESSMENT — PAIN - FUNCTIONAL ASSESSMENT
PAIN_FUNCTIONAL_ASSESSMENT: 0-10
PAIN_FUNCTIONAL_ASSESSMENT: 0-10

## 2024-09-20 ASSESSMENT — PAIN SCALES - GENERAL: PAINLEVEL_OUTOF10: 0 - NO PAIN

## 2024-09-20 NOTE — PROGRESS NOTES
Tana Hargrove is a 79 y.o. female admitted for Generalized weakness. Pharmacy reviewed the patient's [id number] medications and allergies for accuracy.    The list below reflects the PTA list prior to pharmacy medication history. A summary a changes to the PTA medication list has been listed below. Please review each medication in order reconciliation for additional clarification and justification.    Source of information:  PATIENT  FAMILY    Medications added:  DOCUSATE SODIUM 100MG 1 BID  PRAVASTATIN 40MG 1 every day  JANUVIA 25MG 1 QD    Medications modified:    Medications to be removed:  CEPHALEXIN 500MG  1 BID  ATORVASTATIN 10MG    Medications of concern:  ELIQUIS 2.5MG 1 BID LAST FILL 6/13/24  LOSARTAN 50MG 1 every day (NO FILLS)  PREGABALIN 100MG 1 every day LAST FILL 4/16/24        Prior to Admission Medications   Prescriptions Last Dose Informant Patient Reported? Taking?   apixaban (Eliquis) 2.5 mg tablet   No No   Sig: Take 1 tablet (2.5 mg) by mouth every 12 hours.   atorvastatin (Lipitor) 10 mg tablet   No No   Sig: Take 1 tablet (10 mg) by mouth once daily.   carvedilol (Coreg) 25 mg tablet   No No   Sig: Take 1 tablet (25 mg) by mouth 2 times a day.   cephalexin (Keflex) 500 mg capsule   No No   Sig: Take 1 capsule (500 mg) by mouth 2 times a day for 5 days.   cholecalciferol (Vitamin D-3) 50 MCG (2000 UT) tablet   Yes No   Sig: Take 1 tablet (2,000 Units) by mouth once daily.   cyanocobalamin (Vitamin B-12) 1,000 mcg tablet   Yes No   Sig: Take 1 tablet (1,000 mcg) by mouth once daily.   levoFLOXacin (Levaquin) 250 mg tablet   No No   Sig: Take 1 tablet (250 mg) by mouth once daily for 10 days.   losartan (Cozaar) 50 mg tablet   No No   Sig: Take 1 tablet (50 mg) by mouth once daily.   oxyCODONE (Roxicodone) 5 mg immediate release tablet   No No   Sig: Take 1 tablet (5 mg) by mouth every 6 hours if needed for severe pain (7 - 10).   Patient not taking: Reported on 9/18/2024   pioglitazone  (Actos) 15 mg tablet   No No   Sig: Take 1 tablet (15 mg) by mouth once daily.   pregabalin (Lyrica) 100 mg capsule   No No   Sig: TAKE ONE CAPSULE BY MOUTH TWICE DAILY @9AM & 5PM      Facility-Administered Medications: None       Reyna Sweeney

## 2024-09-20 NOTE — H&P
[facility] - GENERAL MEDICINE HISTORY AND PHYSICAL    History Obtained From: Pt  Collateral History: Chart review    History Of Present Illness:  Tana Hargrove is a 79 y.o. female with PMHx s/f ESRD on HD (MWF), hypertension, hyperlipidemia, CAD, HFpEF, COPD (no baseline O2), NIDDM2, paroxysmal atrial fibrillation on eliquis, chronic anemia of ESRD and myelodysplastic syndrome requiring MARILYNN and intermittent PRBC infusions, osteoarthritis, anxiety, depression, GERD presenting with generalized weakness. She states she was seen in ED on 09/16/24 for UTI and was prescribed Keflex. She took 5 pills and felt the symptoms weren't improving and went to her PCP, who started her on levofloxacin 250 mg daily for 10 days. She came to ED today because she has been progressively getting weaker, having a harder time ambulating with her rollator from her bedroom to bathroom. She has decreased appetite for past couple days and only eating yogurts as her meals. She hasn't been taking her medications and missed her dialysis for a week, last visited on 9/13/24 (last Friday) due to her weakness. She has dizziness when standing up to walk. She denies f/c/n/v, chest pain, sob, abd pain, dysuria, urinary frequency/urgency, bowel changes, leg swelling, syncope.     ED Course (Summary):   Vitals on presentation: 97.3F, 72 bpm, 20 RR, 168/66, 97% on RA  Labs: CMP-glucose 124, BUN/creatinine 47 2.42, EGFR 20  CBC-WBC 2.9, hemoglobin 7.0, hematocrit 21.2, neutrophils 1.37  UA-protein 2+, bacteria 1+, occasional hyaline casts  Imaging: CXR-no evidence of acute intrathoracic abnormality  CTAP-No obstructive uropathy. No nephrolithiasis demonstrated Interval removal of cholecystostomy tube with cholecystectomy demonstrated. Spinal fusion lower lumbar spine with laminectomy changes with artifact limiting characterization of the thecal sac. Uncomplicated sigmoid diverticulosis.  Interventions: None, admission for further  management    ED Course (From ED Provider):  ED Course as of 09/20/24 1543   Fri Sep 20, 2024   1035 EKG interpreted by me. Sinus rhythm. Rate 70. No acute ischemic changes. No ST elevation. Normal QRS duration. [CD]   1530 XR chest 1 view [CD]      ED Course User Index  [CD] Josue Dee MD         Diagnoses as of 09/20/24 1543   Generalized weakness   Chronic renal failure, unspecified CKD stage   Complicated urinary tract infection     Relevant Results  Results for orders placed or performed during the hospital encounter of 09/20/24 (from the past 24 hour(s))   Sars-CoV-2 PCR   Result Value Ref Range    Coronavirus 2019, PCR Not Detected Not Detected   CBC and Auto Differential   Result Value Ref Range    WBC 2.9 (L) 4.4 - 11.3 x10*3/uL    nRBC 0.0 0.0 - 0.0 /100 WBCs    RBC 2.16 (L) 4.00 - 5.20 x10*6/uL    Hemoglobin 7.0 (L) 12.0 - 16.0 g/dL    Hematocrit 21.2 (L) 36.0 - 46.0 %    MCV 98 80 - 100 fL    MCH 32.4 26.0 - 34.0 pg    MCHC 33.0 32.0 - 36.0 g/dL    RDW 22.2 (H) 11.5 - 14.5 %    Platelets 201 150 - 450 x10*3/uL    Neutrophils % 47.4 40.0 - 80.0 %    Immature Granulocytes %, Automated 0.3 0.0 - 0.9 %    Lymphocytes % 38.8 13.0 - 44.0 %    Monocytes % 10.4 2.0 - 10.0 %    Eosinophils % 2.8 0.0 - 6.0 %    Basophils % 0.3 0.0 - 2.0 %    Neutrophils Absolute 1.37 (L) 1.60 - 5.50 x10*3/uL    Immature Granulocytes Absolute, Automated 0.01 0.00 - 0.50 x10*3/uL    Lymphocytes Absolute 1.12 0.80 - 3.00 x10*3/uL    Monocytes Absolute 0.30 0.05 - 0.80 x10*3/uL    Eosinophils Absolute 0.08 0.00 - 0.40 x10*3/uL    Basophils Absolute 0.01 0.00 - 0.10 x10*3/uL   Comprehensive Metabolic Panel   Result Value Ref Range    Glucose 124 (H) 74 - 99 mg/dL    Sodium 138 136 - 145 mmol/L    Potassium 4.2 3.5 - 5.3 mmol/L    Chloride 106 98 - 107 mmol/L    Bicarbonate 26 21 - 32 mmol/L    Anion Gap 10 10 - 20 mmol/L    Urea Nitrogen 47 (H) 6 - 23 mg/dL    Creatinine 2.42 (H) 0.50 - 1.05 mg/dL    eGFR 20 (L) >60  mL/min/1.73m*2    Calcium 9.4 8.6 - 10.3 mg/dL    Albumin 3.9 3.4 - 5.0 g/dL    Alkaline Phosphatase 122 33 - 136 U/L    Total Protein 6.4 6.4 - 8.2 g/dL    AST 11 9 - 39 U/L    Bilirubin, Total 0.6 0.0 - 1.2 mg/dL    ALT 18 7 - 45 U/L   Lactate   Result Value Ref Range    Lactate 0.7 0.4 - 2.0 mmol/L   Morphology   Result Value Ref Range    RBC Morphology See Below     Hypochromia Mild     Ovalocytes Few     Teardrop Cells Few    Urinalysis with Reflex Microscopic   Result Value Ref Range    Color, Urine Light-Yellow Light-Yellow, Yellow, Dark-Yellow    Appearance, Urine Clear Clear    Specific Gravity, Urine 1.014 1.005 - 1.035    pH, Urine 5.5 5.0, 5.5, 6.0, 6.5, 7.0, 7.5, 8.0    Protein, Urine 100 (2+) (A) NEGATIVE, 10 (TRACE), 20 (TRACE) mg/dL    Glucose, Urine Normal Normal mg/dL    Blood, Urine NEGATIVE NEGATIVE    Ketones, Urine NEGATIVE NEGATIVE mg/dL    Bilirubin, Urine NEGATIVE NEGATIVE    Urobilinogen, Urine Normal Normal mg/dL    Nitrite, Urine NEGATIVE NEGATIVE    Leukocyte Esterase, Urine NEGATIVE NEGATIVE   Microscopic Only, Urine   Result Value Ref Range    WBC, Urine 1-5 1-5, NONE /HPF    RBC, Urine 3-5 NONE, 1-2, 3-5 /HPF    Squamous Epithelial Cells, Urine 1-9 (SPARSE) Reference range not established. /HPF    Bacteria, Urine 1+ (A) NONE SEEN /HPF    Hyaline Casts, Urine OCCASIONAL (A) NONE /LPF     *Note: Due to a large number of results and/or encounters for the requested time period, some results have not been displayed. A complete set of results can be found in Results Review.      CT abdomen pelvis wo IV contrast    Result Date: 9/20/2024  Interpreted By:  Modesto Wu, STUDY: CT ABDOMEN PELVIS WO IV CONTRAST; ;  9/20/2024 12:49 pm   INDICATION: Signs/Symptoms:flank pain.     COMPARISON: 05/19/2024   ACCESSION NUMBER(S): [id number]   ORDERING CLINICIAN: SHAYAN DWYER   TECHNIQUE: Serial axial unenhanced CT images obtained of the abdomen pelvis. Images reformatted in the coronal and  sagittal projection   All CT examinations are performed with 1 or more of the following dose reduction techniques: Automated exposure control, adjustment of mA and/or kv according to patient's size, or use of iterative reconstruction techniques.   FINDINGS: Included lung bases demonstrate mild chronic appearing basilar interstitial changes. No infiltrate or effusion identified. Distal esophagus is unremarkable   Liver is unremarkable within limits of this unenhanced CT   Spleen and adrenal glands are unremarkable   Interval removal of the previously noted cholecystostomy tube with surgical staples in the right upper quadrant status post cholecystectomy.   Pancreas is unremarkable. There is a focal calcification within the pancreatic head at the margin with 2nd portion of the duodenum. This appears separate from the area of the ampulla and distribution of the common bile duct. Otherwise, pancreas is unremarkable within limits of this unenhanced CT.   Right kidney demonstrates no hydronephrosis or nephrolithiasis. Visualized course of the right ureter is unremarkable   Left kidney demonstrates no hydronephrosis or nephrolithiasis. Visualized course of the left ureter is unremarkable.   Retroperitoneum demonstrates diffuse vascular calcification of the abdominal aorta. There is no lymphadenopathy. No ascites demonstrated   Loops of large bowel demonstrate uncomplicated sigmoid diverticulosis. Descending colon is contracted limiting characterization. No pericolonic fat stranding demonstrated. Small bowel loops are nondilated. There is no lymphadenopathy in the mesentery. Stomach is contracted limiting characterization.   CT pelvis:   Unopacified bladder demonstrates mild thick-walled appearance in relation to contraction. There is no stranding of the perivesical fat. No pelvic lymphadenopathy. No free fluid demonstrated. Status post hysterectomy.   Visualized osseous structures demonstrates spinal fusion from L2 through  L5 with interbody spacer placement at L5/S1. Laminectomy changes are demonstrated from L3 through L5. Limited characterization of the thecal sac within limits of the CT. However, no significant stenosis demonstrated.                   1. No obstructive uropathy. No nephrolithiasis demonstrated   2. Interval removal of cholecystostomy tube with cholecystectomy demonstrated. No stranding of the fat   3. Spinal fusion lower lumbar spine with laminectomy changes with artifact limiting characterization of the thecal sac. However, no significant narrowing within limits of the CT.   4. Uncomplicated sigmoid diverticulosis.     MACRO: None   Signed by: Modesto Wu 9/20/2024 1:20 PM Dictation workstation:   [id number]    XR chest 1 view    Result Date: 9/20/2024  Interpreted By:  Manuel Callaway, STUDY: XR CHEST 1 VIEW   INDICATION: Signs/Symptoms:cough.   COMPARISON: September 16, 2024   ACCESSION NUMBER(S): [id number]   ORDERING CLINICIAN: SHAYAN DWYER   FINDINGS: Right-sided central line again noted SVC right atrial junction unchanged. No consolidation, effusion, edema, or pneumothorax.       No evidence of acute intrathoracic abnormality.   Signed by: Manuel Callaway 9/20/2024 11:39 AM Dictation workstation:   [id number]    Scheduled medications:  atorvastatin, 10 mg, oral, Daily  carvedilol, 25 mg, oral, BID  enoxaparin, 30 mg, subcutaneous, q24h  insulin lispro, 0-10 Units, subcutaneous, Before meals & nightly  losartan, 50 mg, oral, Daily  [START ON 9/21/2024] pantoprazole, 40 mg, oral, Daily before breakfast   Or  [START ON 9/21/2024] pantoprazole, 40 mg, intravenous, Daily before breakfast  polyethylene glycol, 17 g, oral, Daily      Continuous medications:     PRN medications:  PRN medications: bisacodyl, dextrose, dextrose, glucagon, glucagon, guaiFENesin, melatonin, ondansetron     Past Medical History  She has a past medical history of Abnormal levels of other serum enzymes, Acute kidney failure  (CMS-HCC), Anemia, CKD (chronic kidney disease), COPD (chronic obstructive pulmonary disease) (Multi), Coronary artery disease, Disease of blood and blood-forming organs, unspecified, HLD (hyperlipidemia), Hypertension, MDS (myelodysplastic syndrome) (Multi), Personal history of other diseases of the musculoskeletal system and connective tissue, Personal history of other specified conditions, Personal history of other specified conditions, and Type 2 diabetes mellitus (Multi).    She has no past medical history of Adverse effect of anesthesia, Arthritis, Asthma ([facility]), Awareness under anesthesia, CHF (congestive heart failure) (Multi), Delayed emergence from general anesthesia, Disease of thyroid gland, Hard to intubate, History of transfusion, Malignant hyperthermia, PONV (postoperative nausea and vomiting), Pseudocholinesterase deficiency, Sickle cell anemia (Multi), Spinal headache, or Stroke (Multi).    Surgical History  She has a past surgical history that includes Other surgical history (12/13/2019); Other surgical history (12/13/2019); Other surgical history (Bilateral, 12/13/2019); Other surgical history (Left, 12/13/2019); Other surgical history (12/13/2019); Other surgical history (12/13/2019); Other surgical history (Right, 12/13/2019); Other surgical history (04/16/2021); MR angio head wo IV contrast (11/20/2020); MR angio neck wo IV contrast (11/20/2020); Breast biopsy (Left); Hysterectomy; Breast lumpectomy; Appendectomy; Colonoscopy; Oophorectomy; Joint replacement; and Cholecystectomy (06/06/2024).     Social History  She reports that she has never smoked. She has never been exposed to tobacco smoke. She has never used smokeless tobacco. She reports that she does not currently use alcohol. She reports that she does not use drugs.    Family History  Family History   Problem Relation Name Age of Onset    Heart disease Mother      Heart attack Father      Hodgkin's lymphoma Son          Allergies  Codeine, Hydrocodone, Hydrocodone-acetaminophen, Tramadol, Piperacillin-tazobactam, Adhesive tape-silicones, and Meperidine    Code Status  Full Code     Review of Systems   Constitutional:  Positive for appetite change. Negative for chills, fatigue and fever.   Respiratory:  Negative for cough, chest tightness, shortness of breath and wheezing.    Cardiovascular:  Negative for chest pain, palpitations and leg swelling.   Gastrointestinal:  Negative for abdominal pain, constipation, diarrhea, nausea and vomiting.   Genitourinary:  Negative for dysuria, flank pain, frequency, hematuria and urgency.   Musculoskeletal:  Negative for back pain and joint swelling.   Skin:  Negative for rash and wound.   Neurological:  Positive for dizziness and weakness. Negative for tremors, syncope and headaches.        Generalized weakness       Last Recorded Vitals  /68   Pulse 67   Temp 36.3 °C (97.3 °F)   Resp 19   Wt 64.3 kg (141 lb 12.1 oz)   SpO2 96%      Physical Exam:  Vital signs and nursing notes reviewed.   Constitutional: Pleasant and cooperative. Ill-appearing. Pale. Conversant.   Skin: Warm and dry; no obvious lesions, rashes, pallor, or jaundice. Good turgor.   Eyes: EOMI. Anicteric sclera.   ENT: Mucous membranes moist; no obvious injury or deformity appreciated.   Head and Neck: Normocephalic, atraumatic. ROM preserved.   Respiratory: Nonlabored on RA. Lungs clear to auscultation bilaterally without obvious adventitious sounds. Chest rise is equal.  Cardiovascular: RRR. No gross murmur, gallop, or rub. Extremities are warm and well-perfused with good capillary refill (< 3 seconds). No chest wall tenderness.   GI: Mild tender in LLQ. Abdomen soft, nondistended. No obvious organomegaly appreciated. Bowel sounds are present and normoactive.  : No CVA tenderness.   MSK: No gross abnormalities appreciated. No limitations to AROM/PROM appreciated.   Extremities: No cyanosis, edema, or clubbing  evident. Neurovascularly intact.   Neuro: A&Ox3. CN 2-12 grossly intact. Able to respond to questions appropriately and clearly. No acute focal neurologic deficits appreciated.  Psych: Appropriate mood and behavior.    Assessment/Plan     79 y.o. female with PMHx s/f ESRD on HD ([facility]), hypertension, hyperlipidemia, CAD, HFpEF, COPD (no baseline O2), NIDDM2, paroxysmal atrial fibrillation on eliquis, chronic anemia of ESRD and myelodysplastic syndrome requiring MARILYNN and intermittent PRBC infusions, osteoarthritis, anxiety, depression, GERD presenting with generalized weakness.     Generalized weakness  Ambulatory dysfunction  -likely 2/2 UTI with underlying chronic anemia  -PT/OT appreciated  -started on gentle LR hydration of 75cc/h x 14 hrs    UTI  -UA improving compared to 9/16  -urine culture on 9/16 shows E. Coli  -continuing with oral levofloxacin  -does not meet sepsis criteria at admission    Acute on chronic anemia of ESRD  -Hgb 7.0 at admission  -type and screen pending, consider starting RBC transfusion once type and screen is back  -denies acute blood loss, no melena, hematochezia, hemoptysis     ESRD on HD (MW schedule, received her last dialysis on 9/13/24)  -Nephrology consult     HTN, HLD  -Continue home medications   -Monitor and adjust as needed while admitted      NIDDM-II   -Hold home oral meds for now   -Continue with SSI ACHS   -Accucheks, hypoglycemic protocol   -Monitor and adjust as needed      Paroxysmal A-fib  -Continue on Coreg for rate control  -Hold Eliquis for now due to low hemoglobin    Diet: cardiac  DVT Prophylaxis: SCDs  Code Status: full code     Jess Dodd PA-C    Dragon dictation software was used to dictate this note and thus there may be minor errors in translation/transcription including garbled speech or misspellings. Please contact for clarification if needed.

## 2024-09-20 NOTE — ED PROVIDER NOTES
HPI   Chief Complaint   Patient presents with    Weakness, Gen    missed last 3 dialysis, UTI       79-year-old female with a history of dialysis dependent renal failure presents with generalized weakness, fever, and chills. She has not been dialyzed since last Friday (seven days ago) because she has been feeling so weak. She initially started with urinary tract infection symptoms. She had a urine culture. She was started on Keflex initially and was called yesterday with her culture results. She is nearly pain sensitive but had already been switched to renal dosed levofloxacin. She is not getting any better despite being on her second dose of levofloxacin. Still having urinary symptoms but now mostly weakness, to the point where she is barely able to get out of bed.              Patient History   Past Medical History:   Diagnosis Date    Abnormal levels of other serum enzymes     Elevated liver enzymes    Acute kidney failure (CMS-HCC)     Anemia     CKD (chronic kidney disease)     COPD (chronic obstructive pulmonary disease) (Multi)     Coronary artery disease     Disease of blood and blood-forming organs, unspecified     Bone marrow disorder    HLD (hyperlipidemia)     Hypertension     MDS (myelodysplastic syndrome) (Multi)     Personal history of other diseases of the musculoskeletal system and connective tissue     History of muscle pain    Personal history of other specified conditions     History of insomnia    Personal history of other specified conditions     History of edema    Type 2 diabetes mellitus (Multi)      Past Surgical History:   Procedure Laterality Date    APPENDECTOMY      BREAST BIOPSY Left     left excisional    BREAST LUMPECTOMY      CHOLECYSTECTOMY  06/06/2024    partial cholecystectomy    COLONOSCOPY      HYSTERECTOMY      JOINT REPLACEMENT      MR HEAD ANGIO WO IV CONTRAST  11/20/2020    MR HEAD ANGIO WO IV CONTRAST 11/20/2020 [facility] CLINICAL LEGACY    MR NECK ANGIO WO IV CONTRAST   11/20/2020    MR NECK ANGIO WO IV CONTRAST 11/20/2020 [facility] CLINICAL LEGACY    OOPHORECTOMY      OTHER SURGICAL HISTORY  12/13/2019    Oophorectomy bilateral    OTHER SURGICAL HISTORY  12/13/2019    Tubal ligation    OTHER SURGICAL HISTORY Bilateral 12/13/2019    Knee replacement    OTHER SURGICAL HISTORY Left 12/13/2019    Shoulder surgery    OTHER SURGICAL HISTORY  12/13/2019    Hysterectomy    OTHER SURGICAL HISTORY  12/13/2019    Lumpectomy    OTHER SURGICAL HISTORY Right 12/13/2019    Foot surgery    OTHER SURGICAL HISTORY  04/16/2021    Back surgery     Family History   Problem Relation Name Age of Onset    Heart disease Mother      Heart attack Father      Hodgkin's lymphoma Son       Social History     Tobacco Use    Smoking status: Never     Passive exposure: Never    Smokeless tobacco: Never   Vaping Use    Vaping status: Never Used   Substance Use Topics    Alcohol use: Not Currently    Drug use: Never       Physical Exam   ED Triage Vitals [09/20/24 1027]   Temperature Heart Rate Respirations BP   36.3 °C (97.3 °F) 72 20 168/66      Pulse Ox Temp src Heart Rate Source Patient Position   97 % -- -- --      BP Location FiO2 (%)     -- --       Physical Exam  Vitals and nursing note reviewed.   Constitutional:       General: She is in acute distress.      Appearance: She is well-developed. She is ill-appearing.   HENT:      Head: Normocephalic and atraumatic.   Eyes:      Conjunctiva/sclera: Conjunctivae normal.   Cardiovascular:      Rate and Rhythm: Normal rate and regular rhythm.      Heart sounds: No murmur heard.  Pulmonary:      Effort: Pulmonary effort is normal. No respiratory distress.      Breath sounds: Normal breath sounds.   Abdominal:      Palpations: Abdomen is soft.      Tenderness: There is no abdominal tenderness.   Musculoskeletal:         General: No swelling.      Cervical back: Neck supple.   Skin:     General: Skin is warm and dry.      Capillary Refill: Capillary refill takes less  than 2 seconds.   Neurological:      Mental Status: She is alert.   Psychiatric:         Mood and Affect: Mood normal.           ED Course & MDM   ED Course as of 09/20/24 1531   Fri Sep 20, 2024   1035 EKG interpreted by me. Sinus rhythm. Rate 70. No acute ischemic changes. No ST elevation. Normal QRS duration. [CD]   1530 XR chest 1 view [CD]      ED Course User Index  [CD] Josue Dee MD         Diagnoses as of 09/20/24 1531   Generalized weakness   Chronic renal failure, unspecified CKD stage   Complicated urinary tract infection                 No data recorded     Tasneem Coma Scale Score: 15 (09/20/24 1028 : Saida Guy, GE)                           Medical Decision Making  Urinalysis is fairly unremarkable but she took an antibiotic this morning. Creatinine is not significantly elevated despite missing dialysis for seven days. Potassium normal. Chest x-ray clear. CT abdomen/pelvis negative for obvious acute process. COVID negative. On reevaluation, she is still globally week. Unable to ambulate secondary to weakness. She is not comfortable going home. She has been unable to get out of bed to make dialysis sessions and she is still having diaphoresis, fatigue, and is unable to perform her activities of daily living. Will admit to general medical floor.        Procedure  Procedures     Josue Dee MD  09/20/24 1532

## 2024-09-20 NOTE — ED TRIAGE NOTES
Pt arrived via EMS from home. Pt states that she was seen here on Monday and dx with an UTI, pt states the antibiotics are not working and she is increasing in weakness. Pt states that her last dialysis apt was 1 wk ago on friday

## 2024-09-21 ENCOUNTER — APPOINTMENT (OUTPATIENT)
Dept: DIALYSIS | Facility: HOSPITAL | Age: 79
End: 2024-09-21
Payer: MEDICARE

## 2024-09-21 LAB
ALBUMIN SERPL BCP-MCNC: 3.7 G/DL (ref 3.4–5)
ALP SERPL-CCNC: 104 U/L (ref 33–136)
ALT SERPL W P-5'-P-CCNC: 14 U/L (ref 7–45)
ANION GAP SERPL CALC-SCNC: 9 MMOL/L (ref 10–20)
AST SERPL W P-5'-P-CCNC: 12 U/L (ref 9–39)
BACTERIA UR CULT: NO GROWTH
BASOPHILS # BLD AUTO: 0.02 X10*3/UL (ref 0–0.1)
BASOPHILS NFR BLD AUTO: 0.6 %
BILIRUB SERPL-MCNC: 1 MG/DL (ref 0–1.2)
BUN SERPL-MCNC: 46 MG/DL (ref 6–23)
CALCIUM SERPL-MCNC: 9.2 MG/DL (ref 8.6–10.3)
CHLORIDE SERPL-SCNC: 107 MMOL/L (ref 98–107)
CO2 SERPL-SCNC: 25 MMOL/L (ref 21–32)
CREAT SERPL-MCNC: 2.6 MG/DL (ref 0.5–1.05)
EGFRCR SERPLBLD CKD-EPI 2021: 18 ML/MIN/1.73M*2
EOSINOPHIL # BLD AUTO: 0.15 X10*3/UL (ref 0–0.4)
EOSINOPHIL NFR BLD AUTO: 4.6 %
ERYTHROCYTE [DISTWIDTH] IN BLOOD BY AUTOMATED COUNT: 22.2 % (ref 11.5–14.5)
FERRITIN SERPL-MCNC: 3724 NG/ML (ref 8–150)
GLUCOSE BLD MANUAL STRIP-MCNC: 113 MG/DL (ref 74–99)
GLUCOSE BLD MANUAL STRIP-MCNC: 115 MG/DL (ref 74–99)
GLUCOSE BLD MANUAL STRIP-MCNC: 167 MG/DL (ref 74–99)
GLUCOSE BLD MANUAL STRIP-MCNC: 173 MG/DL (ref 74–99)
GLUCOSE SERPL-MCNC: 102 MG/DL (ref 74–99)
HCT VFR BLD AUTO: 22.2 % (ref 36–46)
HGB BLD-MCNC: 7.4 G/DL (ref 12–16)
HYPOCHROMIA BLD QL SMEAR: NORMAL
IMM GRANULOCYTES # BLD AUTO: 0.01 X10*3/UL (ref 0–0.5)
IMM GRANULOCYTES NFR BLD AUTO: 0.3 % (ref 0–0.9)
IRON SATN MFR SERPL: ABNORMAL %
IRON SERPL-MCNC: 169 UG/DL (ref 35–150)
LYMPHOCYTES # BLD AUTO: 1.54 X10*3/UL (ref 0.8–3)
LYMPHOCYTES NFR BLD AUTO: 47.7 %
MCH RBC QN AUTO: 31.6 PG (ref 26–34)
MCHC RBC AUTO-ENTMCNC: 33.3 G/DL (ref 32–36)
MCV RBC AUTO: 95 FL (ref 80–100)
MONOCYTES # BLD AUTO: 0.35 X10*3/UL (ref 0.05–0.8)
MONOCYTES NFR BLD AUTO: 10.8 %
NEUTROPHILS # BLD AUTO: 1.16 X10*3/UL (ref 1.6–5.5)
NEUTROPHILS NFR BLD AUTO: 36 %
NRBC BLD-RTO: 0 /100 WBCS (ref 0–0)
OVALOCYTES BLD QL SMEAR: NORMAL
PHOSPHATE SERPL-MCNC: 4.3 MG/DL (ref 2.5–4.9)
PLATELET # BLD AUTO: 172 X10*3/UL (ref 150–450)
POTASSIUM SERPL-SCNC: 3.8 MMOL/L (ref 3.5–5.3)
PROT SERPL-MCNC: 5.8 G/DL (ref 6.4–8.2)
RBC # BLD AUTO: 2.34 X10*6/UL (ref 4–5.2)
RBC MORPH BLD: NORMAL
SODIUM SERPL-SCNC: 137 MMOL/L (ref 136–145)
STOMATOCYTES BLD QL SMEAR: NORMAL
TIBC SERPL-MCNC: ABNORMAL UG/DL
UIBC SERPL-MCNC: <55 UG/DL (ref 110–370)
WBC # BLD AUTO: 3.2 X10*3/UL (ref 4.4–11.3)

## 2024-09-21 PROCEDURE — 85025 COMPLETE CBC W/AUTO DIFF WBC: CPT

## 2024-09-21 PROCEDURE — 2500000001 HC RX 250 WO HCPCS SELF ADMINISTERED DRUGS (ALT 637 FOR MEDICARE OP): Performed by: STUDENT IN AN ORGANIZED HEALTH CARE EDUCATION/TRAINING PROGRAM

## 2024-09-21 PROCEDURE — 2500000001 HC RX 250 WO HCPCS SELF ADMINISTERED DRUGS (ALT 637 FOR MEDICARE OP)

## 2024-09-21 PROCEDURE — 80053 COMPREHEN METABOLIC PANEL: CPT

## 2024-09-21 PROCEDURE — 82728 ASSAY OF FERRITIN: CPT | Performed by: STUDENT IN AN ORGANIZED HEALTH CARE EDUCATION/TRAINING PROGRAM

## 2024-09-21 PROCEDURE — 99233 SBSQ HOSP IP/OBS HIGH 50: CPT | Performed by: HOSPITALIST

## 2024-09-21 PROCEDURE — 1100000001 HC PRIVATE ROOM DAILY

## 2024-09-21 PROCEDURE — 83540 ASSAY OF IRON: CPT | Performed by: STUDENT IN AN ORGANIZED HEALTH CARE EDUCATION/TRAINING PROGRAM

## 2024-09-21 PROCEDURE — 8010000001 HC DIALYSIS - HEMODIALYSIS PER DAY

## 2024-09-21 PROCEDURE — 2500000004 HC RX 250 GENERAL PHARMACY W/ HCPCS (ALT 636 FOR OP/ED): Performed by: STUDENT IN AN ORGANIZED HEALTH CARE EDUCATION/TRAINING PROGRAM

## 2024-09-21 PROCEDURE — 82947 ASSAY GLUCOSE BLOOD QUANT: CPT

## 2024-09-21 PROCEDURE — 36415 COLL VENOUS BLD VENIPUNCTURE: CPT

## 2024-09-21 PROCEDURE — 84100 ASSAY OF PHOSPHORUS: CPT | Performed by: STUDENT IN AN ORGANIZED HEALTH CARE EDUCATION/TRAINING PROGRAM

## 2024-09-21 RX ORDER — HEPARIN SODIUM 1000 [USP'U]/ML
1600 INJECTION, SOLUTION INTRAVENOUS; SUBCUTANEOUS
Status: DISCONTINUED | OUTPATIENT
Start: 2024-09-22 | End: 2024-09-23 | Stop reason: HOSPADM

## 2024-09-21 RX ORDER — HEPARIN SODIUM 1000 [USP'U]/ML
1600 INJECTION, SOLUTION INTRAVENOUS; SUBCUTANEOUS
Status: DISCONTINUED | OUTPATIENT
Start: 2024-09-21 | End: 2024-09-23 | Stop reason: HOSPADM

## 2024-09-21 ASSESSMENT — PAIN - FUNCTIONAL ASSESSMENT: PAIN_FUNCTIONAL_ASSESSMENT: 0-10

## 2024-09-21 ASSESSMENT — COGNITIVE AND FUNCTIONAL STATUS - GENERAL
WALKING IN HOSPITAL ROOM: A LOT
DAILY ACTIVITIY SCORE: 24
DAILY ACTIVITIY SCORE: 20
MOBILITY SCORE: 14
MOVING TO AND FROM BED TO CHAIR: A LITTLE
WALKING IN HOSPITAL ROOM: A LOT
TURNING FROM BACK TO SIDE WHILE IN FLAT BAD: A LOT
TURNING FROM BACK TO SIDE WHILE IN FLAT BAD: A LITTLE
STANDING UP FROM CHAIR USING ARMS: A LOT
TOILETING: A LITTLE
STANDING UP FROM CHAIR USING ARMS: A LOT
CLIMB 3 TO 5 STEPS WITH RAILING: A LOT
MOVING TO AND FROM BED TO CHAIR: A LOT
MOBILITY SCORE: 16
CLIMB 3 TO 5 STEPS WITH RAILING: A LOT
MOBILITY SCORE: 22
CLIMB 3 TO 5 STEPS WITH RAILING: A LITTLE
WALKING IN HOSPITAL ROOM: A LITTLE
DRESSING REGULAR LOWER BODY CLOTHING: A LITTLE
HELP NEEDED FOR BATHING: A LITTLE
PERSONAL GROOMING: A LITTLE

## 2024-09-21 ASSESSMENT — PAIN SCALES - GENERAL
PAINLEVEL_OUTOF10: 0 - NO PAIN

## 2024-09-21 NOTE — PRE-PROCEDURE NOTE
Report from Sending RN:    Report From: Chase Ramires RN  Recent Surgery of Procedure: No  Baseline Level of Consciousness (LOC): x4  Oxygen Use: No  Type: RA  Diabetic: Yes  Last BP Med Given Day of Dialysis: See MAR  Last Pain Med Given: See MAR  Lab Tests to be Obtained with Dialysis: No  Blood Transfusion to be Given During Dialysis: No  Available IV Access: Yes  Medications to be Administered During Dialysis: No  Continuous IV Infusion Running: No  Restraints on Currently or in the Last 24 Hours: No  Hand-Off Communication: Ready for dialysis   Dialysis Catheter Dressing: Will check  Last Dressing Change: Will check

## 2024-09-21 NOTE — PROGRESS NOTES
Tana Hargrove [id number]   Service: Internal Medicine / Hospitalist Date of service: 9/21/2024                          Full Code                Overnight Events: No new overnight events reported by patient or nursing.     Subjective    History Obtained From: Pt  Collateral History: Chart review     History Of Present Illness:  Tana Hargrove is a 79 y.o. female with PMHx s/f ESRD on HD (MWF), hypertension, hyperlipidemia, CAD, HFpEF, COPD (no baseline O2), NIDDM2, paroxysmal atrial fibrillation on eliquis, chronic anemia of ESRD and myelodysplastic syndrome requiring MARILYNN and intermittent PRBC infusions, osteoarthritis, anxiety, depression, GERD presenting with generalized weakness. She states she was seen in ED on 09/16/24 for UTI and was prescribed Keflex. She took 5 pills and felt the symptoms weren't improving and went to her PCP, who started her on levofloxacin 250 mg daily for 10 days. She came to ED today because she has been progressively getting weaker, having a harder time ambulating with her rollator from her bedroom to bathroom. She has decreased appetite for past couple days and only eating yogurts as her meals. She hasn't been taking her medications and missed her dialysis for a week, last visited on 9/13/24 (last Friday) due to her weakness. She has dizziness when standing up to walk. She denies f/c/n/v, chest pain, sob, abd pain, dysuria, urinary frequency/urgency, bowel changes, leg swelling, syncope.      ED Course (Summary):   Vitals on presentation: 97.3F, 72 bpm, 20 RR, 168/66, 97% on RA  Labs: CMP-glucose 124, BUN/creatinine 47 2.42, EGFR 20  CBC-WBC 2.9, hemoglobin 7.0, hematocrit 21.2, neutrophils 1.37  UA-protein 2+, bacteria 1+, occasional hyaline casts  Imaging: CXR-no evidence of acute intrathoracic abnormality  CTAP-No obstructive uropathy. No nephrolithiasis demonstrated Interval removal of cholecystostomy tube with cholecystectomy demonstrated. Spinal fusion lower lumbar spine with  laminectomy changes with artifact limiting characterization of the thecal sac. Uncomplicated sigmoid diverticulosis.  Interventions: None, admission for further management     ED Course (From ED Provider):      ED Course as of 09/20/24 1543   Fri Sep 20, 2024   1035 EKG interpreted by me. Sinus rhythm. Rate 70. No acute ischemic changes. No ST elevation. Normal QRS duration. [CD]   1530 XR chest 1 view [CD]           9/21.................No reported: Chest pains, dyspnea at rest, palpitations, abdominal pain, fevers or chills    Review of Systems:   Review of system otherwise negative if not aforementioned above in subjective.    Objective    Physical Exam     Constitutional:       Appearance: Patient appeared in no acute cardiopulmonary distress.     Comments: Patient alert and oriented to person place time and situation.Patient appeared nontoxic.  HEENT:      Head: Normocephalic and atraumatic.Trachea midline      Nose:No observed congestion or rhinorrhea.     Mouth/Throat: Mucous membranes Moist, Trachea appeared  midline.  Eyes:      Extraocular Movements: Extraocular movements intact.      Pupils: Pupils are equal, round, and reactive to light.      Comments: No scleral icterus or conjunctival injection appreciated.   Cardiovascular:      Rate and Rhythm: Normal rate and regular rhythm. No clicks rubs or gallops, normal S1 and S2.No peripheral stigmata of endocarditis appreciated.     Pulmonary:      Lungs appeared clear to auscultation, no adventitious sound appreciated.  Abdominal:      General: Abdomen soft, nontender, active bowel sounds, no involuntary guarding or rebound tenderness appreciated.     Comments: None   Musculoskeletal:       Patient appeared to have full active range of motion for upper and lower extremities, no acute apparent joint deformity appreciated on examination.   No pitting edema or cyanosis appreciated.       Lymphadenopathy:      No appreciable palpable lymphadenopathy  Skin:      General: Skin is warm.      Coloration:  No jaundice     Findings: No abnormal appearing skin rashes or lesions that appeared acute noted on unclothed area of the skin..   Neurological:      General: No focal sensory or motor deficits appreciated, no meningeal signs or dysmetria noted.      Cranial Nerves: Cranial nerves II to XII appearing grossly intact.     Genitals:  Deferred  Psychiatric:         The patient appears to be displaying normal mood and affect at the time of evaluation.    Labs:     Lab Results   Component Value Date    GLUCOSE 102 (H) 09/21/2024    CALCIUM 9.2 09/21/2024     09/21/2024    K 3.8 09/21/2024    CO2 25 09/21/2024     09/21/2024    BUN 46 (H) 09/21/2024    CREATININE 2.60 (H) 09/21/2024      Lab Results   Component Value Date    WBC 3.2 (L) 09/21/2024    HGB 7.4 (L) 09/21/2024    HCT 22.2 (L) 09/21/2024    MCV 95 09/21/2024     09/21/2024      [unfilled]   [unfilled]   Susceptibility data from last 90 days.  Collected Specimen Info Organism Amoxicillin/Clavulanate Ampicillin Ampicillin/Sulbactam Cefazolin Cefazolin (uncomplicated UTIs only) Ciprofloxacin Gentamicin Nitrofurantoin Piperacillin/Tazobactam Trimethoprim/Sulfamethoxazole   09/16/24 Urine from Clean Catch/Voided Escherichia coli  S  R  I  S  S  S  S  S  S  S               X-rays/ Images    [unfilled]   Radiology Results (last 21 days)    Procedure Component Value Units Date/Time   CT abdomen pelvis wo IV contrast [[id number]] Collected: 09/20/24 1322   Order Status: Completed Updated: 09/20/24 1322   Narrative:     Interpreted By:  Modesto Wu,  STUDY:  CT ABDOMEN PELVIS WO IV CONTRAST; ;  9/20/2024 12:49 pm      INDICATION:  Signs/Symptoms:flank pain.          COMPARISON:  05/19/2024      ACCESSION NUMBER(S):  [id number]      ORDERING CLINICIAN:  SHAYAN DWYER      TECHNIQUE:  Serial axial unenhanced CT images obtained of the abdomen pelvis.  Images reformatted in the coronal and  sagittal projection      All CT examinations are performed with 1 or more of the following  dose reduction techniques: Automated exposure control, adjustment of  mA and/or kv according to patient's size, or use of iterative  reconstruction techniques.      FINDINGS:  Included lung bases demonstrate mild chronic appearing basilar  interstitial changes. No infiltrate or effusion identified. Distal  esophagus is unremarkable      Liver is unremarkable within limits of this unenhanced CT      Spleen and adrenal glands are unremarkable      Interval removal of the previously noted cholecystostomy tube with  surgical staples in the right upper quadrant status post  cholecystectomy.      Pancreas is unremarkable. There is a focal calcification within the  pancreatic head at the margin with 2nd portion of the duodenum. This  appears separate from the area of the ampulla and distribution of the  common bile duct. Otherwise, pancreas is unremarkable within limits  of this unenhanced CT.      Right kidney demonstrates no hydronephrosis or nephrolithiasis.  Visualized course of the right ureter is unremarkable      Left kidney demonstrates no hydronephrosis or nephrolithiasis.  Visualized course of the left ureter is unremarkable.      Retroperitoneum demonstrates diffuse vascular calcification of the  abdominal aorta. There is no lymphadenopathy. No ascites demonstrated      Loops of large bowel demonstrate uncomplicated sigmoid  diverticulosis. Descending colon is contracted limiting  characterization. No pericolonic fat stranding demonstrated. Small  bowel loops are nondilated. There is no lymphadenopathy in the  mesentery. Stomach is contracted limiting characterization.      CT pelvis:      Unopacified bladder demonstrates mild thick-walled appearance in  relation to contraction. There is no stranding of the perivesical  fat. No pelvic lymphadenopathy. No free fluid demonstrated. Status  post hysterectomy.      Visualized  yes osseous structures demonstrates spinal fusion from L2  through L5 with interbody spacer placement at L5/S1. Laminectomy  changes are demonstrated from L3 through L5. Limited characterization  of the thecal sac within limits of the CT. However, no significant  stenosis demonstrated.                               Impression:     1. No obstructive uropathy. No nephrolithiasis demonstrated      2. Interval removal of cholecystostomy tube with cholecystectomy  demonstrated. No stranding of the fat      3. Spinal fusion lower lumbar spine with laminectomy changes with  artifact limiting characterization of the thecal sac. However, no  significant narrowing within limits of the CT.      4. Uncomplicated sigmoid diverticulosis.          MACRO:  None      Signed by: Modesto uW 9/20/2024 1:20 PM  Dictation workstation:   [id number]   XR chest 1 view [[id number]] Collected: 09/20/24 1140   Order Status: Completed Updated: 09/20/24 1248   Narrative:     Interpreted By:  Manuel Callaway,  STUDY:  XR CHEST 1 VIEW      INDICATION:  Signs/Symptoms:cough.      COMPARISON:  September 16, 2024      ACCESSION NUMBER(S):  [id number]      ORDERING CLINICIAN:  SHAYAN DWYER      FINDINGS:  Right-sided central line again noted SVC right atrial junction  unchanged. No consolidation, effusion, edema, or pneumothorax.       Impression:     No evidence of acute intrathoracic abnormality.      Signed by: Manuel Callaway 9/20/2024 11:39 AM  Dictation workstation:   [id number]             Medical Problems       Problem List       * (Principal) Generalized weakness    Lumbago    A-fib (Multi)    Anxiety    Chronic diastolic heart failure of unknown etiology (Multi) (Chronic)    Cervical spondylosis without myelopathy    Coronary artery disease    Degeneration of intervertebral disc of lumbar region    Hypertension, essential (Chronic)    Frequent falls    GERD (gastroesophageal reflux disease) (Chronic)    Hyperlipidemia (Chronic)     Inability to ambulate due to multiple joints    Jerking movements of extremities    Spinal stenosis of lumbar region    Lumbar spondylosis    Macrocytic anemia    Myelodysplastic syndrome (Multi) (Chronic)    Myofascial pain syndrome    Occasional tremors    Osteoporosis    Stage 4 chronic kidney disease (Multi) (Chronic)    Weakness generalized    Gout    Scoliosis of lumbar region due to degenerative disease of spine in adult    Depression    Lower extremity edema    Diabetes mellitus with complication (Multi)    COPD without exacerbation (Multi) (Chronic)    Primary osteoarthritis of right knee    Fall    Acute kidney failure, unspecified (CMS-HCC)    Muscle weakness (generalized)    Primary osteoarthritis    Repeated falls    Paroxysmal atrial fibrillation (Multi)    Chronic diastolic (congestive) heart failure (Multi)    Chronic obstructive pulmonary disease, unspecified (Multi)    CKD (chronic kidney disease)    Normocytic anemia    Essential (primary) hypertension    HLD (hyperlipidemia)    Type 2 diabetes mellitus with hyperglycemia, without long-term current use of insulin (Multi)    Type 2 diabetes mellitus without complications (Multi)    Obesity (BMI 30-39.9)    Cellulitis of toe of right foot    Acute kidney injury (nontraumatic) (CMS-[facility])    ESRD (end stage renal disease) on dialysis (Multi) (Chronic)    Anemia due to chronic kidney disease, on chronic dialysis (Multi) (Chronic)    Thickening of wall of gallbladder    Allergy, unspecified, sequela    Anaphylactic shock, unspecified, sequela    Articular cartilage disorder of hip    Bone marrow disorder    Coagulation defect, unspecified (Multi)    Contact with and (suspected) exposure to covid-19    Fracture of bone of hip (Multi)    History of anemia    Hyperkalemia    Pain of lower extremity    Pain, unspecified    Plantar fasciitis    Pneumonia due to infectious organism    Chest pain    Acute pulmonary edema (Multi)    Elevated bilirubin    Atrial  fibrillation with rapid ventricular response (Multi)    Calculus of gallbladder with acute cholecystitis with obstruction    Generalized abdominal pain    Acute on chronic anemia    Hypokalemia               Above medical problems may be reflective of historical medical problems that may have resolved and may not related to acute clinical condition/medical problems.    Clinical impression/plan:      Assessment/Plan  79 y.o. female with PMHx s/f ESRD on HD (MWF), hypertension, hyperlipidemia, CAD, HFpEF, COPD (no baseline O2), NIDDM2, paroxysmal atrial fibrillation on eliquis, chronic anemia of ESRD and myelodysplastic syndrome requiring MARILYNN and intermittent PRBC infusions, osteoarthritis, anxiety, depression, GERD presenting with generalized weakness.      Generalized weakness  Ambulatory dysfunction  -likely 2/2 UTI with underlying chronic anemia  -PT/OT appreciated  -started on gentle LR hydration of 75cc/h x 14 hrs     UTI  -UA improving compared to 9/16  -urine culture on 9/16 shows E. Coli  -continuing with oral levofloxacin  -does not meet sepsis criteria at admission     Acute on chronic anemia of ESRD  -Hgb 7.0 at admission  -type and screen pending, consider starting RBC transfusion once type and screen is back  -denies acute blood loss, no melena, hematochezia, hemoptysis     ESRD on HD (MWF schedule, received her last dialysis on 9/13/24)  -Nephrology consult     HTN, HLD  -Continue home medications   -Monitor and adjust as needed while admitted      NIDDM-II   -Hold home oral meds for now   -Continue with SSI ACHS   -Accucheks, hypoglycemic protocol   -Monitor and adjust as needed      Paroxysmal A-fib  -Continue on Coreg for rate control  -Hold Eliquis for now due to low hemoglobin     Diet: cardiac  DVT Prophylaxis: SCDs  Code Status: full code      Disposition/additional care plan/interventions: 9/21/2024             Care plan discussed in detail with patient and patient family at the bedside.   Diagnosis differential discussed in detail with patient and patient family at the bedside.    Patient's daughter also had patient's son on the phone during interview.  All family's questions answered.    Await nephrology evaluation and treatment patient has missed dialysis.  Appears x 1 week.    Reported myelodysplastic syndrome.    Anemia............... treated with blood transfusion yesterday    Physical therapy evaluatio,patient's daughter stated patient's was doing exceptionally well before this recent UTI.            Patient transition to [facility] outpatient it appeared by family physician a.  Susceptibility to of E. coli related UTI.   Reviewed............... for quinolone sensitive.    Urine Culture   >100,000 Escherichia coli Abnormal         Resulting Agency: [facility]    Susceptibility       Escherichia coli    MICROSCAN   Amoxicillin/Clavulanate Susceptible   Ampicillin Resistant   Ampicillin/Sulbactam Intermediate   Cefazolin Susceptible   Cefazolin (uncomplicated UTIs only) Susceptible   Ciprofloxacin Susceptible   Gentamicin Susceptible   Nitrofurantoin Susceptible   Piperacillin/Tazobactam Susceptible   Trimethoprim/Sulfamethoxazole Susceptibl       Patient reported malaise and overall fatigue appears multifactorial blood transfusion implemented patient on antibiotic therapy, pathogen directed.    Renal replacement therapy needed await recommendations from nephrology.    Patient state that she does not like dialysis......................... patient informed of the importance of renal replacement therapy.      The patient was informed of differential diagnosis , work up , plan of care and possible sequelae of clinical disposition.Patient in agreement with plan of care. Further recommendations forthcoming in accordance with patient's clinical disposition and response to care.    Discharge planning: Discharge timing to be determined.    Care time: > 55 mins           Dictation performed with assistance of  voice recognition device therefore transcription errors are possible.

## 2024-09-21 NOTE — CARE PLAN
The clinical goals for the shift include pt will be free from falls and injury    Problem: Diabetes  Goal: Maintain electrolyte levels within acceptable range throughout shift  Outcome: Progressing     Problem: Pain - Adult  Goal: Verbalizes/displays adequate comfort level or baseline comfort level  Outcome: Progressing     Problem: Safety - Adult  Goal: Free from fall injury  Outcome: Progressing     Problem: Discharge Planning  Goal: Discharge to home or other facility with appropriate resources  Outcome: Progressing

## 2024-09-21 NOTE — CONSULTS
Reason For Consult  ESRD    History Of Present Illness  Tana Hargrove is a 79 y.o. female presenting with PMHx s/f ESRD on HD (MWF), hypertension, hyperlipidemia, CAD, HFpEF, COPD (no baseline O2), NIDDM2, paroxysmal atrial fibrillation on eliquis, chronic anemia of ESRD and myelodysplastic syndrome requiring MARILYNN and intermittent PRBC infusions, osteoarthritis, anxiety, depression, GERD presenting with generalized weakness.  She reports overall feeling poorly for the past week for which she did not feel able to go to dialysis.  She was recently seen in the ED on 9/16/2020 for for which she was diagnosed with UTI impression prior Keflex.  However given progressive weakness she presents to the ED for further evaluation.  She otherwise denied any worsening shortness of breath, chest pain, fever, chills, sweats, changes in bowel.     Past Medical History  She has a past medical history of Abnormal levels of other serum enzymes, Acute kidney failure (CMS-HCC), Anemia, CKD (chronic kidney disease), COPD (chronic obstructive pulmonary disease) (Multi), Coronary artery disease, Disease of blood and blood-forming organs, unspecified, HLD (hyperlipidemia), Hypertension, MDS (myelodysplastic syndrome) (Multi), Personal history of other diseases of the musculoskeletal system and connective tissue, Personal history of other specified conditions, Personal history of other specified conditions, and Type 2 diabetes mellitus (Multi).    She has no past medical history of Adverse effect of anesthesia, Arthritis, Asthma ([facility]), Awareness under anesthesia, CHF (congestive heart failure) (Multi), Delayed emergence from general anesthesia, Disease of thyroid gland, Hard to intubate, History of transfusion, Malignant hyperthermia, PONV (postoperative nausea and vomiting), Pseudocholinesterase deficiency, Sickle cell anemia (Multi), Spinal headache, or Stroke (Multi).    Surgical History  She has a past surgical history that includes  Other surgical history (12/13/2019); Other surgical history (12/13/2019); Other surgical history (Bilateral, 12/13/2019); Other surgical history (Left, 12/13/2019); Other surgical history (12/13/2019); Other surgical history (12/13/2019); Other surgical history (Right, 12/13/2019); Other surgical history (04/16/2021); MR angio head wo IV contrast (11/20/2020); MR angio neck wo IV contrast (11/20/2020); Breast biopsy (Left); Hysterectomy; Breast lumpectomy; Appendectomy; Colonoscopy; Oophorectomy; Joint replacement; and Cholecystectomy (06/06/2024).     Social History  She reports that she has never smoked. She has never been exposed to tobacco smoke. She has never used smokeless tobacco. She reports that she does not currently use alcohol. She reports that she does not use drugs.    Family History  Family History   Problem Relation Name Age of Onset    Heart disease Mother      Heart attack Father      Hodgkin's lymphoma Son          Allergies  Codeine, Hydrocodone, Hydrocodone-acetaminophen, Tramadol, Piperacillin-tazobactam, Adhesive tape-silicones, and Meperidine    Review of Systems  Negative except for as above     Physical Exam    GEN: NAD, weak appearing  HEENT: No JVD  CVS: s1/S2, no MGR  RESP: CTABL, no wheezing, rhonchi or rales  Abdomen: Soft, NT, ND, +BS  Extremities: No clubbing, cyanosis, or edema   Neuro: non-focal            I&O 24HR    Intake/Output Summary (Last 24 hours) at 9/21/2024 1611  Last data filed at 9/21/2024 1348  Gross per 24 hour   Intake 1041 ml   Output --   Net 1041 ml       Vitals 24HR  Heart Rate:  [51-79]   Temp:  [36.1 °C (96.9 °F)-37 °C (98.6 °F)]   Resp:  [14-20]   BP: (147-188)/(64-83)   SpO2:  [93 %-97 %]       Relevant Results   Latest Reference Range & Units 09/21/24 04:35   GLUCOSE 74 - 99 mg/dL 102 (H)   SODIUM 136 - 145 mmol/L 137   POTASSIUM 3.5 - 5.3 mmol/L 3.8   CHLORIDE 98 - 107 mmol/L 107   Bicarbonate 21 - 32 mmol/L 25   Anion Gap 10 - 20 mmol/L 9 (L)   Blood Urea  Nitrogen 6 - 23 mg/dL 46 (H)   Creatinine 0.50 - 1.05 mg/dL 2.60 (H)   EGFR >60 mL/min/1.73m*2 18 (L)   Calcium 8.6 - 10.3 mg/dL 9.2   Albumin 3.4 - 5.0 g/dL 3.7   Alkaline Phosphatase 33 - 136 U/L 104   ALT 7 - 45 U/L 14   AST 9 - 39 U/L 12   Bilirubin Total 0.0 - 1.2 mg/dL 1.0   Total Protein 6.4 - 8.2 g/dL 5.8 (L)   (H): Data is abnormally high  (L): Data is abnormally low     Latest Reference Range & Units 09/21/24 04:35   WBC 4.4 - 11.3 x10*3/uL 3.2 (L)   nRBC 0.0 - 0.0 /100 WBCs 0.0   RBC 4.00 - 5.20 x10*6/uL 2.34 (L)   HEMOGLOBIN 12.0 - 16.0 g/dL 7.4 (L)   HEMATOCRIT 36.0 - 46.0 % 22.2 (L)   MCV 80 - 100 fL 95   MCH 26.0 - 34.0 pg 31.6   MCHC 32.0 - 36.0 g/dL 33.3   RED CELL DISTRIBUTION WIDTH 11.5 - 14.5 % 22.2 (H)   Platelets 150 - 450 x10*3/uL 172   Neutrophils % 40.0 - 80.0 % 36.0   Immature Granulocytes %, Automated 0.0 - 0.9 % 0.3   Lymphocytes % 13.0 - 44.0 % 47.7   Monocytes % 2.0 - 10.0 % 10.8   Eosinophils % 0.0 - 6.0 % 4.6   Basophils % 0.0 - 2.0 % 0.6   Neutrophils Absolute 1.60 - 5.50 x10*3/uL 1.16 (L)   Immature Granulocytes Absolute, Automated 0.00 - 0.50 x10*3/uL 0.01   Lymphocytes Absolute 0.80 - 3.00 x10*3/uL 1.54   Monocytes Absolute 0.05 - 0.80 x10*3/uL 0.35   Eosinophils Absolute 0.00 - 0.40 x10*3/uL 0.15   Basophils Absolute 0.00 - 0.10 x10*3/uL 0.02   RBC Morphology  See Below   Hypochromia  Mild   Ovalocytes  Few   Stomatocytes  Few   (L): Data is abnormally low  (H): Data is abnormally high     Assessment/Plan       # ESRD MWF, nonoliguric  # Hypertension  # Anemia of CKD  # CKD BMD  # Access: TDC  # Generalized weakness  # UTI  # Diabetes mellitus type 2    PLAN:    - iHD today, we will otherwise continue MWF while here  - Daily Nephrocaps while on RRT  - Volume, electrolyte and acid/base management as per iHD  - Continue home antihypertensives  - Transfuse Hg < 7, will obtain repeat iron studies  - Phos goal 3.5-5.5   - Dose meds for eGFR < 15  - Rest of care as per primary    Amando  AKASH Troy MD

## 2024-09-22 VITALS
RESPIRATION RATE: 16 BRPM | WEIGHT: 141.76 LBS | OXYGEN SATURATION: 94 % | SYSTOLIC BLOOD PRESSURE: 147 MMHG | HEIGHT: 61 IN | DIASTOLIC BLOOD PRESSURE: 70 MMHG | HEART RATE: 55 BPM | BODY MASS INDEX: 26.76 KG/M2 | TEMPERATURE: 98.5 F

## 2024-09-22 LAB
ANION GAP SERPL CALC-SCNC: 8 MMOL/L (ref 10–20)
BACTERIA BLD CULT: NORMAL
BUN SERPL-MCNC: 26 MG/DL (ref 6–23)
CALCIUM SERPL-MCNC: 8.5 MG/DL (ref 8.6–10.3)
CHLORIDE SERPL-SCNC: 106 MMOL/L (ref 98–107)
CO2 SERPL-SCNC: 26 MMOL/L (ref 21–32)
CREAT SERPL-MCNC: 2.28 MG/DL (ref 0.5–1.05)
EGFRCR SERPLBLD CKD-EPI 2021: 21 ML/MIN/1.73M*2
ERYTHROCYTE [DISTWIDTH] IN BLOOD BY AUTOMATED COUNT: 22.1 % (ref 11.5–14.5)
GLUCOSE BLD MANUAL STRIP-MCNC: 108 MG/DL (ref 74–99)
GLUCOSE BLD MANUAL STRIP-MCNC: 112 MG/DL (ref 74–99)
GLUCOSE BLD MANUAL STRIP-MCNC: 169 MG/DL (ref 74–99)
GLUCOSE BLD MANUAL STRIP-MCNC: 96 MG/DL (ref 74–99)
GLUCOSE SERPL-MCNC: 107 MG/DL (ref 74–99)
HCT VFR BLD AUTO: 20.9 % (ref 36–46)
HGB BLD-MCNC: 7.2 G/DL (ref 12–16)
MCH RBC QN AUTO: 32.9 PG (ref 26–34)
MCHC RBC AUTO-ENTMCNC: 34.4 G/DL (ref 32–36)
MCV RBC AUTO: 95 FL (ref 80–100)
NRBC BLD-RTO: 0 /100 WBCS (ref 0–0)
PLATELET # BLD AUTO: 156 X10*3/UL (ref 150–450)
POTASSIUM SERPL-SCNC: 4.2 MMOL/L (ref 3.5–5.3)
RBC # BLD AUTO: 2.19 X10*6/UL (ref 4–5.2)
SODIUM SERPL-SCNC: 136 MMOL/L (ref 136–145)
WBC # BLD AUTO: 2.8 X10*3/UL (ref 4.4–11.3)

## 2024-09-22 PROCEDURE — 99232 SBSQ HOSP IP/OBS MODERATE 35: CPT | Performed by: HOSPITALIST

## 2024-09-22 PROCEDURE — 85027 COMPLETE CBC AUTOMATED: CPT | Performed by: HOSPITALIST

## 2024-09-22 PROCEDURE — 82947 ASSAY GLUCOSE BLOOD QUANT: CPT

## 2024-09-22 PROCEDURE — 36415 COLL VENOUS BLD VENIPUNCTURE: CPT | Performed by: HOSPITALIST

## 2024-09-22 PROCEDURE — 2500000001 HC RX 250 WO HCPCS SELF ADMINISTERED DRUGS (ALT 637 FOR MEDICARE OP): Performed by: STUDENT IN AN ORGANIZED HEALTH CARE EDUCATION/TRAINING PROGRAM

## 2024-09-22 PROCEDURE — 82374 ASSAY BLOOD CARBON DIOXIDE: CPT | Performed by: HOSPITALIST

## 2024-09-22 PROCEDURE — 2500000004 HC RX 250 GENERAL PHARMACY W/ HCPCS (ALT 636 FOR OP/ED): Performed by: STUDENT IN AN ORGANIZED HEALTH CARE EDUCATION/TRAINING PROGRAM

## 2024-09-22 PROCEDURE — 1100000001 HC PRIVATE ROOM DAILY

## 2024-09-22 PROCEDURE — 97161 PT EVAL LOW COMPLEX 20 MIN: CPT | Mod: GP | Performed by: PHYSICAL THERAPIST

## 2024-09-22 PROCEDURE — 2500000001 HC RX 250 WO HCPCS SELF ADMINISTERED DRUGS (ALT 637 FOR MEDICARE OP)

## 2024-09-22 RX ORDER — ACETAMINOPHEN 325 MG/1
650 TABLET ORAL EVERY 4 HOURS PRN
Status: DISCONTINUED | OUTPATIENT
Start: 2024-09-22 | End: 2024-09-23 | Stop reason: HOSPADM

## 2024-09-22 ASSESSMENT — PAIN - FUNCTIONAL ASSESSMENT
PAIN_FUNCTIONAL_ASSESSMENT: 0-10

## 2024-09-22 ASSESSMENT — COGNITIVE AND FUNCTIONAL STATUS - GENERAL
MOBILITY SCORE: 16
TURNING FROM BACK TO SIDE WHILE IN FLAT BAD: A LITTLE
PERSONAL GROOMING: A LITTLE
TOILETING: A LITTLE
WALKING IN HOSPITAL ROOM: A LITTLE
DAILY ACTIVITIY SCORE: 20
TURNING FROM BACK TO SIDE WHILE IN FLAT BAD: A LITTLE
STANDING UP FROM CHAIR USING ARMS: A LITTLE
WALKING IN HOSPITAL ROOM: A LITTLE
STANDING UP FROM CHAIR USING ARMS: A LITTLE
MOVING TO AND FROM BED TO CHAIR: A LITTLE
MOVING TO AND FROM BED TO CHAIR: A LITTLE
DRESSING REGULAR LOWER BODY CLOTHING: A LITTLE
TOILETING: A LITTLE
HELP NEEDED FOR BATHING: A LITTLE
DRESSING REGULAR LOWER BODY CLOTHING: A LITTLE
MOBILITY SCORE: 17
TURNING FROM BACK TO SIDE WHILE IN FLAT BAD: A LITTLE
DAILY ACTIVITIY SCORE: 20
STANDING UP FROM CHAIR USING ARMS: A LOT
CLIMB 3 TO 5 STEPS WITH RAILING: A LOT
WALKING IN HOSPITAL ROOM: A LOT
MOBILITY SCORE: 18
CLIMB 3 TO 5 STEPS WITH RAILING: A LOT
HELP NEEDED FOR BATHING: A LITTLE
PERSONAL GROOMING: A LITTLE
MOVING TO AND FROM BED TO CHAIR: A LITTLE
CLIMB 3 TO 5 STEPS WITH RAILING: TOTAL

## 2024-09-22 ASSESSMENT — PAIN SCALES - GENERAL
PAINLEVEL_OUTOF10: 0 - NO PAIN
PAINLEVEL_OUTOF10: 3

## 2024-09-22 ASSESSMENT — PAIN DESCRIPTION - LOCATION: LOCATION: FOOT

## 2024-09-22 ASSESSMENT — PAIN DESCRIPTION - ORIENTATION: ORIENTATION: RIGHT;LEFT

## 2024-09-22 NOTE — PROGRESS NOTES
Tana Hargrove [id number]   Service: Internal Medicine / Hospitalist Date of service: 9/22/2024                                  Full Code                     Overnight Events: No new overnight events reported by patient or nursing.      Subjective     History Obtained From: Pt  Collateral History: Chart review     History Of Present Illness:  Tana Hargrove is a 79 y.o. female with PMHx s/f ESRD on HD (MWF), hypertension, hyperlipidemia, CAD, HFpEF, COPD (no baseline O2), NIDDM2, paroxysmal atrial fibrillation on eliquis, chronic anemia of ESRD and myelodysplastic syndrome requiring MARILYNN and intermittent PRBC infusions, osteoarthritis, anxiety, depression, GERD presenting with generalized weakness. She states she was seen in ED on 09/16/24 for UTI and was prescribed Keflex. She took 5 pills and felt the symptoms weren't improving and went to her PCP, who started her on levofloxacin 250 mg daily for 10 days. She came to ED today because she has been progressively getting weaker, having a harder time ambulating with her rollator from her bedroom to bathroom. She has decreased appetite for past couple days and only eating yogurts as her meals. She hasn't been taking her medications and missed her dialysis for a week, last visited on 9/13/24 (last Friday) due to her weakness. She has dizziness when standing up to walk. She denies f/c/n/v, chest pain, sob, abd pain, dysuria, urinary frequency/urgency, bowel changes, leg swelling, syncope.      ED Course (Summary):   Vitals on presentation: 97.3F, 72 bpm, 20 RR, 168/66, 97% on RA  Labs: CMP-glucose 124, BUN/creatinine 47 2.42, EGFR 20  CBC-WBC 2.9, hemoglobin 7.0, hematocrit 21.2, neutrophils 1.37  UA-protein 2+, bacteria 1+, occasional hyaline casts  Imaging: CXR-no evidence of acute intrathoracic abnormality  CTAP-No obstructive uropathy. No nephrolithiasis demonstrated Interval removal of cholecystostomy tube with cholecystectomy demonstrated. Spinal fusion lower  lumbar spine with laminectomy changes with artifact limiting characterization of the thecal sac. Uncomplicated sigmoid diverticulosis.  Interventions: None, admission for further management     ED Course (From ED Provider):        ED Course as of 09/20/24 1543   Fri Sep 20, 2024   1035 EKG interpreted by me. Sinus rhythm. Rate 70. No acute ischemic changes. No ST elevation. Normal QRS duration. [CD]   1530 XR chest 1 view [CD]               9/21.................No reported: Chest pains, dyspnea at rest, palpitations, abdominal pain, fevers or chills     9/22.........................Patient seen and examined in presence of her daughter this morning.  Patient admits to feeling better.  Early degree of fatigue on presentation of now markedly improved.  She is inquiring about discharge.  Patient's daughter is a bit hesitant concerning patient being discharged today.  No reported : hemoptysis, epistaxis, melena, hematuria or chest pains.              Review of Systems:         Review of system otherwise negative if not aforementioned above in subjective.     Objective     Physical Exam      Constitutional:       Appearance: Patient appeared in no acute cardiopulmonary distress.     Comments: Patient alert and oriented to: person, place ,time and situation.Patient appeared clinically improved today.  HEENT:      Head: Normocephalic and atraumatic.Trachea midline      Nose:No observed congestion or rhinorrhea.     Mouth/Throat: Mucous membranes Moist, Trachea appeared  midline.  Eyes:      Extraocular Movements: Extraocular movements intact.      Pupils: Pupils are equal, round, and reactive to light.      Comments: No scleral icterus or conjunctival injection appreciated.   Cardiovascular:      Rate and Rhythm: Normal rate and regular rhythm. No clicks rubs or gallops, normal S1 and S2.No peripheral stigmata of endocarditis appreciated.     Pulmonary:      Lungs appeared clear to auscultation, no adventitious sound  appreciated.  Abdominal:      General: Abdomen soft, nontender, active bowel sounds, no involuntary guarding or rebound tenderness appreciated.     Comments: None   Musculoskeletal:       Patient appeared to have full active range of motion for upper and lower extremities, no acute apparent joint deformity appreciated on examination.   No pitting edema or cyanosis appreciated.       Lymphadenopathy:      No appreciable palpable lymphadenopathy  Skin:     General: Skin is warm.      Coloration:  No jaundice     Findings: No abnormal appearing skin rashes or lesions that appeared acute noted on unclothed area of the skin..   Neurological:      General: No focal sensory or motor deficits appreciated, no meningeal signs or dysmetria noted.      Cranial Nerves: Cranial nerves II to XII appearing grossly intact.     Genitals:  Deferred  Psychiatric:         The patient appears to be displaying normal mood and affect at the time of evaluation.     Labs:           Lab Results   Component Value Date     GLUCOSE 102 (H) 09/21/2024     CALCIUM 9.2 09/21/2024      09/21/2024     K 3.8 09/21/2024     CO2 25 09/21/2024      09/21/2024     BUN 46 (H) 09/21/2024     CREATININE 2.60 (H) 09/21/2024            Lab Results   Component Value Date     WBC 3.2 (L) 09/21/2024     HGB 7.4 (L) 09/21/2024     HCT 22.2 (L) 09/21/2024     MCV 95 09/21/2024      09/21/2024      Lab Results   Component Value Date    GLUCOSE 107 (H) 09/22/2024    CALCIUM 8.5 (L) 09/22/2024     09/22/2024    K 4.2 09/22/2024    CO2 26 09/22/2024     09/22/2024    BUN 26 (H) 09/22/2024    CREATININE 2.28 (H) 09/22/2024      Lab Results   Component Value Date    WBC 2.8 (L) 09/22/2024    HGB 7.2 (L) 09/22/2024    HCT 20.9 (L) 09/22/2024    MCV 95 09/22/2024     09/22/2024      [unfilled]   [unfilled]   Susceptibility data from last 90 days.  Collected Specimen Info Organism Amoxicillin/Clavulanate Ampicillin  Ampicillin/Sulbactam Cefazolin Cefazolin (uncomplicated UTIs only) Ciprofloxacin Gentamicin Nitrofurantoin Piperacillin/Tazobactam Trimethoprim/Sulfamethoxazole   09/16/24 Urine from Clean Catch/Voided Escherichia coli  S  R  I  S  S  S  S  S  S  S                   X-rays/ Images     [unfilled]   Radiology Results (last 21 days)     Procedure Component Value Units Date/Time   CT abdomen pelvis wo IV contrast [[id number]] Collected: 09/20/24 1322   Order Status: Completed Updated: 09/20/24 1322   Narrative:     Interpreted By:  Modesto Wu,  STUDY:  CT ABDOMEN PELVIS WO IV CONTRAST; ;  9/20/2024 12:49 pm      INDICATION:  Signs/Symptoms:flank pain.          COMPARISON:  05/19/2024      ACCESSION NUMBER(S):  [id number]      ORDERING CLINICIAN:  SHAYAN DWYER      TECHNIQUE:  Serial axial unenhanced CT images obtained of the abdomen pelvis.  Images reformatted in the coronal and sagittal projection      All CT examinations are performed with 1 or more of the following  dose reduction techniques: Automated exposure control, adjustment of  mA and/or kv according to patient's size, or use of iterative  reconstruction techniques.      FINDINGS:  Included lung bases demonstrate mild chronic appearing basilar  interstitial changes. No infiltrate or effusion identified. Distal  esophagus is unremarkable      Liver is unremarkable within limits of this unenhanced CT      Spleen and adrenal glands are unremarkable      Interval removal of the previously noted cholecystostomy tube with  surgical staples in the right upper quadrant status post  cholecystectomy.      Pancreas is unremarkable. There is a focal calcification within the  pancreatic head at the margin with 2nd portion of the duodenum. This  appears separate from the area of the ampulla and distribution of the  common bile duct. Otherwise, pancreas is unremarkable within limits  of this unenhanced CT.      Right kidney demonstrates no hydronephrosis or  nephrolithiasis.  Visualized course of the right ureter is unremarkable      Left kidney demonstrates no hydronephrosis or nephrolithiasis.  Visualized course of the left ureter is unremarkable.      Retroperitoneum demonstrates diffuse vascular calcification of the  abdominal aorta. There is no lymphadenopathy. No ascites demonstrated      Loops of large bowel demonstrate uncomplicated sigmoid  diverticulosis. Descending colon is contracted limiting  characterization. No pericolonic fat stranding demonstrated. Small  bowel loops are nondilated. There is no lymphadenopathy in the  mesentery. Stomach is contracted limiting characterization.      CT pelvis:      Unopacified bladder demonstrates mild thick-walled appearance in  relation to contraction. There is no stranding of the perivesical  fat. No pelvic lymphadenopathy. No free fluid demonstrated. Status  post hysterectomy.      Visualized osseous structures demonstrates spinal fusion from L2  through L5 with interbody spacer placement at L5/S1. Laminectomy  changes are demonstrated from L3 through L5. Limited characterization  of the thecal sac within limits of the CT. However, no significant  stenosis demonstrated.                               Impression:     1. No obstructive uropathy. No nephrolithiasis demonstrated      2. Interval removal of cholecystostomy tube with cholecystectomy  demonstrated. No stranding of the fat      3. Spinal fusion lower lumbar spine with laminectomy changes with  artifact limiting characterization of the thecal sac. However, no  significant narrowing within limits of the CT.      4. Uncomplicated sigmoid diverticulosis.          MACRO:  None      Signed by: Modesto Wu 9/20/2024 1:20 PM  Dictation workstation:   [id number]   XR chest 1 view [[id number]] Collected: 09/20/24 1140   Order Status: Completed Updated: 09/20/24 1248   Narrative:     Interpreted By:  Manuel Callaway,  STUDY:  XR CHEST 1 VIEW       INDICATION:  Signs/Symptoms:cough.      COMPARISON:  September 16, 2024      ACCESSION NUMBER(S):  [id number]      ORDERING CLINICIAN:  SHAYAN DWYER      FINDINGS:  Right-sided central line again noted SVC right atrial junction  unchanged. No consolidation, effusion, edema, or pneumothorax.       Impression:     No evidence of acute intrathoracic abnormality.      Signed by: Manuel Callaway 9/20/2024 11:39 AM  Dictation workstation:   [id number]                  Medical Problems         Problem List         * (Principal) Generalized weakness     Lumbago     A-fib (Multi)     Anxiety     Chronic diastolic heart failure of unknown etiology (Multi) (Chronic)     Cervical spondylosis without myelopathy     Coronary artery disease     Degeneration of intervertebral disc of lumbar region     Hypertension, essential (Chronic)     Frequent falls     GERD (gastroesophageal reflux disease) (Chronic)     Hyperlipidemia (Chronic)     Inability to ambulate due to multiple joints     Jerking movements of extremities     Spinal stenosis of lumbar region     Lumbar spondylosis     Macrocytic anemia     Myelodysplastic syndrome (Multi) (Chronic)     Myofascial pain syndrome     Occasional tremors     Osteoporosis     Stage 4 chronic kidney disease (Multi) (Chronic)     Weakness generalized     Gout     Scoliosis of lumbar region due to degenerative disease of spine in adult     Depression     Lower extremity edema     Diabetes mellitus with complication (Multi)     COPD without exacerbation (Multi) (Chronic)     Primary osteoarthritis of right knee     Fall     Acute kidney failure, unspecified (CMS-HCC)     Muscle weakness (generalized)     Primary osteoarthritis     Repeated falls     Paroxysmal atrial fibrillation (Multi)     Chronic diastolic (congestive) heart failure (Multi)     Chronic obstructive pulmonary disease, unspecified (Multi)     CKD (chronic kidney disease)     Normocytic anemia     Essential (primary)  hypertension     HLD (hyperlipidemia)     Type 2 diabetes mellitus with hyperglycemia, without long-term current use of insulin (Multi)     Type 2 diabetes mellitus without complications (Multi)     Obesity (BMI 30-39.9)     Cellulitis of toe of right foot     Acute kidney injury (nontraumatic) (CMS-[facility])     ESRD (end stage renal disease) on dialysis (Multi) (Chronic)     Anemia due to chronic kidney disease, on chronic dialysis (Multi) (Chronic)     Thickening of wall of gallbladder     Allergy, unspecified, sequela     Anaphylactic shock, unspecified, sequela     Articular cartilage disorder of hip     Bone marrow disorder     Coagulation defect, unspecified (Multi)     Contact with and (suspected) exposure to covid-19     Fracture of bone of hip (Multi)     History of anemia     Hyperkalemia     Pain of lower extremity     Pain, unspecified     Plantar fasciitis     Pneumonia due to infectious organism     Chest pain     Acute pulmonary edema (Multi)     Elevated bilirubin     Atrial fibrillation with rapid ventricular response (Multi)     Calculus of gallbladder with acute cholecystitis with obstruction     Generalized abdominal pain     Acute on chronic anemia     Hypokalemia                  Above medical problems may be reflective of historical medical problems that may have resolved and may not related to acute clinical condition/medical problems.     Clinical impression/plan:        Assessment/Plan  79 y.o. female with PMHx s/f ESRD on HD (MWF), hypertension, hyperlipidemia, CAD, HFpEF, COPD (no baseline O2), NIDDM2, paroxysmal atrial fibrillation on eliquis, chronic anemia of ESRD and myelodysplastic syndrome requiring MARILYNN and intermittent PRBC infusions, osteoarthritis, anxiety, depression, GERD presenting with generalized weakness.      Generalized weakness  Ambulatory dysfunction  -likely 2/2 UTI with underlying chronic anemia  -PT/OT appreciated  -started on gentle LR hydration of 75cc/h x 14 hrs      UTI  -UA improving compared to 9/16  -urine culture on 9/16 shows E. Coli  -continuing with oral levofloxacin  -does not meet sepsis criteria at admission     Acute on chronic anemia of ESRD  -Hgb 7.0 at admission  -type and screen pending, consider starting RBC transfusion once type and screen is back  -denies acute blood loss, no melena, hematochezia, hemoptysis     ESRD on HD (MWF schedule, received her last dialysis on 9/13/24)  -Nephrology consult     HTN, HLD  -Continue home medications   -Monitor and adjust as needed while admitted      NIDDM-II   -Hold home oral meds for now   -Continue with SSI ACHS   -Accucheks, hypoglycemic protocol   -Monitor and adjust as needed      Paroxysmal A-fib  -Continue on Coreg for rate control  -Hold Eliquis for now due to low hemoglobin     Diet: cardiac  DVT Prophylaxis: SCDs  Code Status: full code        Disposition/additional care plan/interventions: 9/21/2024                 Care plan discussed in detail with patient and patient family at the bedside.  Diagnosis differential discussed in detail with patient and patient family at the bedside.     Patient's daughter also had patient's son on the phone during interview.  All family's questions answered.     Await nephrology evaluation and treatment patient has missed dialysis.  Appears x 1 week.     Reported myelodysplastic syndrome.     Anemia............... treated with blood transfusion yesterday     Physical therapy evaluatio,patient's daughter stated patient's was doing exceptionally well before this recent UTI.                 Patient transition to [facility] outpatient it appeared by family physician a.  Susceptibility to of E. coli related UTI.   Reviewed............... for quinolone sensitive.     Urine Culture   >100,000 Escherichia coli Abnormal            Resulting Agency: [facility]     Susceptibility         Escherichia coli    MICROSCAN   Amoxicillin/Clavulanate Susceptible   Ampicillin Resistant    Ampicillin/Sulbactam Intermediate   Cefazolin Susceptible   Cefazolin (uncomplicated UTIs only) Susceptible   Ciprofloxacin Susceptible   Gentamicin Susceptible   Nitrofurantoin Susceptible   Piperacillin/Tazobactam Susceptible   Trimethoprim/Sulfamethoxazole Susceptibl         Patient reported malaise and overall fatigue appears multifactorial blood transfusion implemented patient on antibiotic therapy, pathogen directed.     Renal replacement therapy needed await recommendations from nephrology.     Patient state that she does not like dialysis......................... patient informed of the importance of renal replacement therapy.      Disposition/additional care plan/interventions: 9/22/2024        Continue pathogen directed therapy for urinary tract infection.    Schedule dialysis days, Monday Wednesday and Friday.    CBC in a.m.    Resume Eliquis that was held with plan to continue Eliquis on discharge monitor blood clot while on Eliquis.  No overt signs of bleeding apparent at this juncture.    Blood transfusion as needed for hemoglobin less than 7.    Outpatient follow-up with hematology oncology concerning myelodysplastic syndrome advised.    Physical therapy evaluation before discharge.          Addendum: Later this afternoon I was contacted by physical therapist who stated that patient ambulates with assistive device at home appeared at her baseline and should be good to go from a physical therapy standpoint.  Anticipate discharge tomorrow after dialysis will check CBC and BMP in the a.m., Eliquis will be resumed today.    The patient was informed of differential diagnosis , work up , plan of care and possible sequelae of clinical disposition.Patient in agreement with plan of care. Further recommendations forthcoming in accordance with patient's clinical disposition and response to care.     Discharge planning: Discharge timing to be determined.     Care time: > 35 mins              Dictation performed  with assistance of voice recognition device therefore transcription errors are possible.

## 2024-09-22 NOTE — CARE PLAN
The patient's goals for the shift include      The clinical goals for the shift include pt will remain free from fall or injury    Over the shift, the patient did not make progress toward the following goals. Barriers to progression include . Recommendations to address these barriers include   Problem: Diabetes  Goal: Achieve decreasing blood glucose levels by end of shift  Outcome: Progressing  Goal: Increase stability of blood glucose readings by end of shift  Outcome: Progressing  Goal: Decrease in ketones present in urine by end of shift  Outcome: Progressing  Goal: Maintain electrolyte levels within acceptable range throughout shift  Outcome: Progressing  Goal: Maintain glucose levels >70mg/dl to <250mg/dl throughout shift  Outcome: Progressing  Goal: No changes in neurological exam by end of shift  Outcome: Progressing  Goal: Learn about and adhere to nutrition recommendations by end of shift  Outcome: Progressing  Goal: Vital signs within normal range for age by end of shift  Outcome: Progressing  Goal: Increase self care and/or family involovement by end of shift  Outcome: Progressing  Goal: Receive DSME education by end of shift  Outcome: Progressing     Problem: Pain - Adult  Goal: Verbalizes/displays adequate comfort level or baseline comfort level  Outcome: Progressing     Problem: Safety - Adult  Goal: Free from fall injury  Outcome: Progressing     Problem: Discharge Planning  Goal: Discharge to home or other facility with appropriate resources  Outcome: Progressing     Problem: Chronic Conditions and Co-morbidities  Goal: Patient's chronic conditions and co-morbidity symptoms are monitored and maintained or improved  Outcome: Progressing   .

## 2024-09-22 NOTE — PROGRESS NOTES
Tana Hargrove is a 79 y.o. female on day 2 of admission presenting with Generalized weakness.      Subjective   Seen and examined this am        Objective          Vitals 24HR  Heart Rate:  [55-65]   Temp:  [36.1 °C (97 °F)-36.9 °C (98.5 °F)]   Resp:  [16-17]   BP: (124-143)/(67-73)   SpO2:  [92 %-97 %]         Intake/Output last 3 Shifts:    Intake/Output Summary (Last 24 hours) at 9/22/2024 1318  Last data filed at 9/22/2024 0100  Gross per 24 hour   Intake 850 ml   Output 1198 ml   Net -348 ml       GEN: NAD, weak appearing  HEENT: No JVD  CVS: s1/S2, no MGR  RESP: CTABL, no wheezing, rhonchi or rales  Abdomen: Soft, NT, ND, +BS  Extremities: No clubbing, cyanosis, or edema   Neuro: non-focal     Relevant Results               Assessment/Plan      Tana Hargrove is a 79 y.o. female presenting with PMHx s/f ESRD on HD (MWF), hypertension, hyperlipidemia, CAD, HFpEF, COPD (no baseline O2), NIDDM2, paroxysmal atrial fibrillation on eliquis, chronic anemia of ESRD and myelodysplastic syndrome requiring MARILYNN and intermittent PRBC infusions, osteoarthritis, anxiety, depression, GERD presenting with generalized weakness. Nephrology is consulted for ESRD management.       # ESRD MWF, nonoliguric  # Hypertension  # Anemia of CKD  # CKD BMD  # Access: TDC  # Generalized weakness  # UTI  # Diabetes mellitus type 2     PLAN:     - Continue iHD MWF while here  - Daily Nephrocaps while on RRT  - Volume, electrolyte and acid/base management as per iHD  - Continue home antihypertensives  - Transfuse Hg < 7, will obtain repeat iron studies  - Phos goal 3.5-5.5   - Dose meds for eGFR < 15  - Rest of care as per primary    Amando [place] MD

## 2024-09-22 NOTE — CARE PLAN
The clinical goals for the shift include pt will remain free from fall or injury      Problem: Diabetes  Goal: Maintain glucose levels >70mg/dl to <250mg/dl throughout shift  Outcome: Progressing     Problem: Pain - Adult  Goal: Verbalizes/displays adequate comfort level or baseline comfort level  Outcome: Progressing     Problem: Safety - Adult  Goal: Free from fall injury  Outcome: Progressing

## 2024-09-22 NOTE — PROGRESS NOTES
Physical Therapy    Physical Therapy Evaluation    Patient Name: Tana Hargrove  MRN: [id number]  Today's Date: 9/22/2024   Time Calculation  Start Time: 1510  Stop Time: 1529  Time Calculation (min): 19 min  2330/2330-A    Assessment/Plan   PT Assessment  PT Assessment Results: Decreased strength, Decreased endurance, Decreased mobility  Rehab Prognosis: Good  Barriers to Discharge: none  Evaluation/Treatment Tolerance: Patient limited by fatigue  End of Session Communication: Bedside nurse  Assessment Comment: pt able to amb around room, may not be far from baseline mobility (does not walk far at home). Recommend PT 1-2 visists as inpatient to ensure consistency (no decline after dialysis), LOW INTENSITY REHAB for general strengthening at home to facilitate independence for living alone  End of Session Patient Position: Up in chair, Alarm on  IP OR SWING BED PT PLAN  Inpatient or Swing Bed: Inpatient  PT Plan  Treatment/Interventions: Bed mobility, Transfer training, Gait training, Neuromuscular re-education, Therapeutic exercise, Therapeutic activity  PT Plan: Ongoing PT, OT consult  PT Frequency: 3 times per week  PT Discharge Recommendations: Low intensity level of continued care  PT Recommended Transfer Status: Stand by assist    Subjective     Current Problem:  1. Generalized weakness        2. Chronic renal failure, unspecified CKD stage        3. Complicated urinary tract infection          General Visit Information:  General  Reason for Referral: decreased appetite, dizzy, difficulty amb bedroom to bathroom  Referred By: JESSENIA ESCALANTE  Past Medical History Relevant to Rehab: ESRD on HD (MWF), hypertension, hyperlipidemia, CAD, HFpEF, COPD (no baseline O2), NIDDM2, paroxysmal atrial fibrillation on eliquis, chronic anemia of ESRD and myelodysplastic syndrome requiring MARILYNN and intermittent PRBC infusions, osteoarthritis, anxiety, depression, GERD  Family/Caregiver Present: No  Prior to Session Communication:  "Bedside nurse (approved PT)  Patient Position Received: Bed, 3 rail up, Alarm on  General Comment: pt seen in 2330 with Billy, very cooperative for PT, insists she is going home after dialysis tomorrow-refuses to consider retur to SNF (NOTE: pt adm 6/6-6/13 for chidi, abd surg-disch to SNf [facility])    Home Living:  Home Living  Type of Home: Condo  Lives With: Alone  Home Adaptive Equipment: Walker rolling or standard  Home Layout: One level  Home Access: Level entry  Home Living Comments: pt states she will be OK to return home \"as long as she can walk to the bathroom\"    Prior Level of Function:  Prior Function Per Pt/Caregiver Report  Level of [place]: Independent with ADLs and functional transfers, Independent with homemaking with ambulation  Receives Help From: Family  Ambulatory Assistance: Needs assistance  Transfers: Assistive device  Gait: Assistive device (uses ROLLATOR at all times)    Precautions:  Precautions  Hearing/Visual Limitations: [place]  Medical Precautions: Fall precautions  Precautions Comment: dialysis MWF     Objective     Pain:  Pain Assessment  Pain Assessment: 0-10  0-10 (Numeric) Pain Score: 0 - No pain    Cognition:  Cognition  Orientation Level: Oriented X4    General Assessments:  General Observation  General Observation: slow positional changed to monitor symptoms: OOB, stood and took steps to chair, rested then stood 2nd time for gait training around room. set up in chair to reduced bedrest   Activity Tolerance  Endurance: Tolerates 10 - 20 min exercise with multiple rests  Activity Tolerance Comments: fatigue only     Dynamic Sitting Balance  Dynamic Sitting-Comments: good  Dynamic Standing Balance  Dynamic Standing-Comments: fair with walker support: no LOB observed    Functional Assessments:     Bed Mobility 1  Bed Mobility 1: Supine to sitting  Level of Assistance 1: Close supervision  Transfer 1  Transfer From 1: Bed to  Transfer to 1: Chair with arms  Technique 1: Sit " to stand, Stand to sit  Transfer Device 1: Walker  Transfer Level of Assistance 1: Contact guard  Transfers 2  Transfer From 2: Chair with arms to  Technique 2: Sit to stand, Stand to sit  Transfer Device 2: Walker  Transfer Level of Assistance 2: Contact guard, Close supervision  Ambulation/Gait Training 1  Device 1: Rolling walker  Assistance 1: Contact guard, Close supervision  Quality of Gait 1:  (reduced frankie)  Comments/Distance (ft) 1: 4, 25  Stairs  Stairs: No       Extremity/Trunk Assessments:        RLE   RLE : Within Functional Limits  LLE   LLE : Within Functional Limits    Outcome Measures:     [facility] Basic Mobility  Turning from your back to your side while in a flat bed without using bedrails: None  Moving from lying on your back to sitting on the side of a flat bed without using bedrails: A little  Moving to and from bed to chair (including a wheelchair): A little  Standing up from a chair using your arms (e.g. wheelchair or bedside chair): A little  To walk in hospital room: A little  Climbing 3-5 steps with railing: Total  Basic Mobility - Total Score: 17     Goals:  Encounter Problems       Encounter Problems (Active)       Mobility       STG - Patient will ambulate household distance using FWW/ROLLATOR with no need for assist to allow living alone       Start:  09/22/24    Expected End:  10/06/24               PT Transfers       STG - Patient will demonstrate safe transfer techniques using FWW/ROLLATOR with no need for assist to allow living alone       Start:  09/22/24    Expected End:  10/06/24               Pain - Adult          Strengthening        Pt will perform 10+ reps AAROM/AROM/RROM BLE to improve functional strength needed for improved mobility.        Start:  09/22/24    Expected End:  10/06/24                 Education Documentation  Mobility Training, taught by Vivian Huerta, PT at 9/22/2024  5:09 PM.  Learner: Patient  Readiness: Acceptance  Method: Explanation  Response:  Verbalizes Understanding, Demonstrated Understanding    Education Comments  No comments found.

## 2024-09-23 ENCOUNTER — APPOINTMENT (OUTPATIENT)
Dept: PRIMARY CARE | Facility: CLINIC | Age: 79
End: 2024-09-23
Payer: MEDICARE

## 2024-09-23 VITALS
HEIGHT: 61 IN | HEART RATE: 59 BPM | SYSTOLIC BLOOD PRESSURE: 101 MMHG | OXYGEN SATURATION: 94 % | WEIGHT: 141.76 LBS | DIASTOLIC BLOOD PRESSURE: 65 MMHG | TEMPERATURE: 96 F | RESPIRATION RATE: 20 BRPM | BODY MASS INDEX: 26.76 KG/M2

## 2024-09-23 LAB
ANION GAP SERPL CALC-SCNC: 8 MMOL/L (ref 10–20)
BUN SERPL-MCNC: 36 MG/DL (ref 6–23)
CALCIUM SERPL-MCNC: 8.5 MG/DL (ref 8.6–10.3)
CHLORIDE SERPL-SCNC: 105 MMOL/L (ref 98–107)
CO2 SERPL-SCNC: 25 MMOL/L (ref 21–32)
CREAT SERPL-MCNC: 3.1 MG/DL (ref 0.5–1.05)
EGFRCR SERPLBLD CKD-EPI 2021: 15 ML/MIN/1.73M*2
ERYTHROCYTE [DISTWIDTH] IN BLOOD BY AUTOMATED COUNT: 21.7 % (ref 11.5–14.5)
GLUCOSE BLD MANUAL STRIP-MCNC: 105 MG/DL (ref 74–99)
GLUCOSE BLD MANUAL STRIP-MCNC: 107 MG/DL (ref 74–99)
GLUCOSE BLD MANUAL STRIP-MCNC: 179 MG/DL (ref 74–99)
GLUCOSE SERPL-MCNC: 109 MG/DL (ref 74–99)
HCT VFR BLD AUTO: 22.4 % (ref 36–46)
HGB BLD-MCNC: 7.4 G/DL (ref 12–16)
MCH RBC QN AUTO: 31.9 PG (ref 26–34)
MCHC RBC AUTO-ENTMCNC: 33 G/DL (ref 32–36)
MCV RBC AUTO: 97 FL (ref 80–100)
NRBC BLD-RTO: 0 /100 WBCS (ref 0–0)
PLATELET # BLD AUTO: 163 X10*3/UL (ref 150–450)
POTASSIUM SERPL-SCNC: 3.7 MMOL/L (ref 3.5–5.3)
RBC # BLD AUTO: 2.32 X10*6/UL (ref 4–5.2)
SODIUM SERPL-SCNC: 134 MMOL/L (ref 136–145)
WBC # BLD AUTO: 3.1 X10*3/UL (ref 4.4–11.3)

## 2024-09-23 PROCEDURE — 2500000001 HC RX 250 WO HCPCS SELF ADMINISTERED DRUGS (ALT 637 FOR MEDICARE OP)

## 2024-09-23 PROCEDURE — 85027 COMPLETE CBC AUTOMATED: CPT | Performed by: HOSPITALIST

## 2024-09-23 PROCEDURE — 80048 BASIC METABOLIC PNL TOTAL CA: CPT | Performed by: HOSPITALIST

## 2024-09-23 PROCEDURE — 2500000001 HC RX 250 WO HCPCS SELF ADMINISTERED DRUGS (ALT 637 FOR MEDICARE OP): Performed by: STUDENT IN AN ORGANIZED HEALTH CARE EDUCATION/TRAINING PROGRAM

## 2024-09-23 PROCEDURE — 2500000004 HC RX 250 GENERAL PHARMACY W/ HCPCS (ALT 636 FOR OP/ED): Performed by: STUDENT IN AN ORGANIZED HEALTH CARE EDUCATION/TRAINING PROGRAM

## 2024-09-23 PROCEDURE — 8010000001 HC DIALYSIS - HEMODIALYSIS PER DAY

## 2024-09-23 PROCEDURE — 5A1D70Z PERFORMANCE OF URINARY FILTRATION, INTERMITTENT, LESS THAN 6 HOURS PER DAY: ICD-10-PCS | Performed by: INTERNAL MEDICINE

## 2024-09-23 PROCEDURE — 99239 HOSP IP/OBS DSCHRG MGMT >30: CPT | Performed by: FAMILY MEDICINE

## 2024-09-23 PROCEDURE — 36415 COLL VENOUS BLD VENIPUNCTURE: CPT | Performed by: HOSPITALIST

## 2024-09-23 PROCEDURE — 2500000001 HC RX 250 WO HCPCS SELF ADMINISTERED DRUGS (ALT 637 FOR MEDICARE OP): Performed by: HOSPITALIST

## 2024-09-23 PROCEDURE — 82947 ASSAY GLUCOSE BLOOD QUANT: CPT

## 2024-09-23 RX ORDER — LEVOFLOXACIN 250 MG/1
250 TABLET ORAL
Start: 2024-09-23 | End: 2024-09-30

## 2024-09-23 ASSESSMENT — COGNITIVE AND FUNCTIONAL STATUS - GENERAL
WALKING IN HOSPITAL ROOM: A LITTLE
TOILETING: A LITTLE
DAILY ACTIVITIY SCORE: 21
HELP NEEDED FOR BATHING: A LITTLE
STANDING UP FROM CHAIR USING ARMS: A LITTLE
MOVING TO AND FROM BED TO CHAIR: A LITTLE
MOBILITY SCORE: 20
CLIMB 3 TO 5 STEPS WITH RAILING: A LITTLE
DRESSING REGULAR LOWER BODY CLOTHING: A LITTLE

## 2024-09-23 NOTE — PROGRESS NOTES
09/23/24 1400   Discharge Planning   Type of Residence Private residence   Who is requesting discharge planning? Provider   Home or Post Acute Services None   Expected Discharge Disposition Home     Unable to assess discharge plans at this time.  Per staff, pt is not feeling well at this time and is unable to talk.  Plan is to discharge home with possible HHC.  TCC team to follow.

## 2024-09-23 NOTE — PRE-PROCEDURE NOTE
Report from Sending RN:    Report From: Saida Ward RN  Recent Surgery of Procedure: No  Baseline Level of Consciousness (LOC): x4  Oxygen Use: No  Type: RA  Diabetic: Yes  Last BP Med Given Day of Dialysis:   See MAR  Last Pain Med Given: See MAR  Lab Tests to be Obtained with Dialysis: No  Blood Transfusion to be Given During Dialysis: No  Available IV Access: Yes  Medications to be Administered During Dialysis: No  Continuous IV Infusion Running: No  Restraints on Currently or in the Last 24 Hours: No  Hand-Off Communication:   Pt ready for dialysis  Dialysis Catheter Dressing: Will check prior to dialysis  Last Dressing Change: Will check prior to dialysis

## 2024-09-23 NOTE — NURSING NOTE
Pt was sitting in chair, pt was very diaphoretic. Vs as charted. Blood sugar 179. Pt assisted to bed and pt c/o headache, dizziness, and some nausea. Pt began to come around once laying down.

## 2024-09-23 NOTE — PROGRESS NOTES
"          Nephrology Progress Note      Nephrology following for ESRD.   Events over night: none  Seen on dialysis today  Patient is feeling much better      /71 (BP Location: Right arm, Patient Position: Lying)   Pulse 60   Temp 36.3 °C (97.3 °F) (Temporal)   Resp 16   Ht 1.549 m (5' 1\")   Wt 64.3 kg (141 lb 12.1 oz)   SpO2 94%   BMI 26.78 kg/m²     Input / Output:  24 HR:   Intake/Output Summary (Last 24 hours) at 9/23/2024 1216  Last data filed at 9/23/2024 1136  Gross per 24 hour   Intake 1300 ml   Output 906 ml   Net 394 ml       Physical Exam   Alert and oriented x 3 NAD  Neck: no JVD  CV: RRR  Lungs: CTA bilaterally  Abd: soft, NT, ND   Ext: no lower extremity edema    Scheduled medications  apixaban, 2.5 mg, oral, q12h  atorvastatin, 10 mg, oral, Nightly  carvedilol, 25 mg, oral, BID  influenza, 0.5 mL, intramuscular, During hospitalization  heparin, 1,600 Units, hemodialysis, After Dialysis  heparin, 1,600 Units, hemodialysis, After Dialysis  insulin lispro, 0-10 Units, subcutaneous, Before meals & nightly  levoFLOXacin, 250 mg, oral, q48h  losartan, 50 mg, oral, Daily  pantoprazole, 40 mg, oral, Daily before breakfast   Or  pantoprazole, 40 mg, intravenous, Daily before breakfast  polyethylene glycol, 17 g, oral, Daily      Continuous medications     PRN medications  PRN medications: acetaminophen, bisacodyl, dextrose, dextrose, diphenhydrAMINE, glucagon, glucagon, guaiFENesin, melatonin, ondansetron   Results from last 7 days   Lab Units 09/23/24  0344 09/22/24  0456 09/21/24  0435   SODIUM mmol/L 134*   < > 137   POTASSIUM mmol/L 3.7   < > 3.8   CHLORIDE mmol/L 105   < > 107   CO2 mmol/L 25   < > 25   BUN mg/dL 36*   < > 46*   CREATININE mg/dL 3.10*   < > 2.60*   CALCIUM mg/dL 8.5*   < > 9.2   PROTEIN TOTAL g/dL  --   --  5.8*   BILIRUBIN TOTAL mg/dL  --   --  1.0   ALK PHOS U/L  --   --  104   ALT U/L  --   --  14   AST U/L  --   --  12   GLUCOSE mg/dL 109*   < > 102*    < > = values in " this interval not displayed.           Results from last 7 days   Lab Units 09/23/24  0344 09/22/24  0456 09/21/24  0435   WBC AUTO x10*3/uL 3.1* 2.8* 3.2*   HEMOGLOBIN g/dL 7.4* 7.2* 7.4*   HEMATOCRIT % 22.4* 20.9* 22.2*   PLATELETS AUTO x10*3/uL 163 156 172        Assessment & Plan:     Tana Hargrove is a 79 y.o. female presenting with PMHx s/f ESRD on HD (MWF), hypertension, hyperlipidemia, CAD, HFpEF, COPD (no baseline O2), NIDDM2, paroxysmal atrial fibrillation on eliquis, chronic anemia of ESRD and myelodysplastic syndrome requiring MARILYNN and intermittent PRBC infusions, osteoarthritis, anxiety, depression, GERD presenting with generalized weakness. Nephrology is consulted for ESRD management.     # ESRD MWF, nonoliguric  # Hypertension  # Anemia of CKD  # CKD BMD  # Access: TDC  # Generalized weakness  # UTI  # Diabetes mellitus type 2     PLAN:     - Continue iHD MWF while here  -HD today, UF goal 0.5 L  - Daily Nephrocaps while on RRT  - Volume, electrolyte and acid/base management as per iHD  - Continue home antihypertensives  - Transfuse Hg < 7  -Receives IV iron and IV Epogen with dialysis  - Phos goal 3.5-5.5   - Dose meds for eGFR < 15  - Rest of care as per primary    Please message me through Talk[employer] chat with any questions or concerns.     Pinky Christie DO  9/23/2024  12:16 PM     [facility] [place]    [address], [address]   [place] [address]  Office: [phone]

## 2024-09-23 NOTE — PROGRESS NOTES
Physical Therapy                 Therapy Communication Note    Patient Name: Tana Hargrove  MRN: [id number]  Department: [facility] DIALYSIS  [facility]  Today's Date: 9/23/2024     Discipline: Physical Therapy    Missed Visit Reason: Missed Visit Reason: Patient in a medical procedure (pt in dialysis)    Missed Time: Attempt    Comment:

## 2024-09-23 NOTE — NURSING NOTE
Pt stated she felt fine and still wanted to go home. Vs as charted. Home going instructions given to pt, stated understanding. Iv d/cd. Pt transported to exit via w/c.

## 2024-09-23 NOTE — DISCHARGE SUMMARY
Discharge Diagnosis  Generalized weakness    Issues Requiring Follow-Up  UTI, anemia, dialysis    This discharge took greater than 35 minutes.    Test Results Pending At Discharge  Pending Labs       Order Current Status    Blood Culture Preliminary result            Hospital Course     79 y.o. female with PMHx s/f ESRD on HD (MWF), hypertension, hyperlipidemia, CAD, HFpEF, COPD (no baseline O2), NIDDM2, paroxysmal atrial fibrillation on eliquis, chronic anemia of ESRD and myelodysplastic syndrome requiring MARILYNN and intermittent PRBC infusions, osteoarthritis, anxiety, depression, GERD presenting with generalized weakness. She states she was seen in ED on 09/16/24 for UTI and was prescribed Keflex. She took 5 pills and felt the symptoms weren't improving and went to her PCP, who started her on levofloxacin 250 mg daily for 10 days. She came to ED today because she has been progressively getting weaker, having a harder time ambulating with her rollator from her bedroom to bathroom. She has decreased appetite for past couple days and only eating yogurts as her meals. She hasn't been taking her medications and missed her dialysis for a week, last visited on 9/13/24 (last Friday) due to her weakness. She has dizziness when standing up to walk. She denies f/c/n/v, chest pain, sob, abd pain, dysuria, urinary frequency/urgency, bowel changes, leg swelling, syncope.      ED Course (Summary):   Vitals on presentation: 97.3F, 72 bpm, 20 RR, 168/66, 97% on RA  Labs: CMP-glucose 124, BUN/creatinine 47 2.42, EGFR 20  CBC-WBC 2.9, hemoglobin 7.0, hematocrit 21.2, neutrophils 1.37  UA-protein 2+, bacteria 1+, occasional hyaline casts  Imaging: CXR-no evidence of acute intrathoracic abnormality  CTAP-No obstructive uropathy. No nephrolithiasis demonstrated Interval removal of cholecystostomy tube with cholecystectomy demonstrated. Spinal fusion lower lumbar spine with laminectomy changes with artifact limiting characterization of  the thecal sac. Uncomplicated sigmoid diverticulosis.  Interventions: None, admission for further management     ED Course (From ED Provider):        ED Course as of 09/20/24 1543   Fri Sep 20, 2024   1035 EKG interpreted by me. Sinus rhythm. Rate 70. No acute ischemic changes. No ST elevation. Normal QRS duration. [CD]   1530 XR chest 1 view [CD]               9/21.................No reported: Chest pains, dyspnea at rest, palpitations, abdominal pain, fevers or chills     9/22.........................Patient seen and examined in presence of her daughter this morning.  Patient admits to feeling better.  Early degree of fatigue on presentation of now markedly improved.  She is inquiring about discharge.  Patient's daughter is a bit hesitant concerning patient being discharged today.  No reported : hemoptysis, epistaxis, melena, hematuria or chest pains.    9/23:             Today patient is seen following dialysis.  She is awake alert and interactive appropriately and desires strongly to be discharged home.  Urine culture grew greater than 100,000 E. coli and will be discharged on Levaquin to 50 mg every 48 hours to complete course of treatment.  Repeat urine culture was no growth.  Otherwise denies interval new symptoms or complaints including chest pain palpitation pleuritic type pain unusual shortness of breath cough sputum nausea abdominal pain flank pain fever or chills.     Review of Systems:   Review of system otherwise negative if not aforementioned above in subjective.                   Objective     Physical Exam      Constitutional:       Appearance: Patient appeared in no acute cardiopulmonary distress.     Comments: Patient alert and oriented to: person, place ,time and situation.Patient appeared clinically improved today.  HEENT:      Head: Normocephalic and atraumatic.Trachea midline      Nose:No observed congestion or rhinorrhea.     Mouth/Throat: Mucous membranes Moist, Trachea appeared   midline.  Eyes:      Extraocular Movements: Extraocular movements intact.      Pupils: Pupils are equal, round, and reactive to light.      Comments: No scleral icterus or conjunctival injection appreciated.   Cardiovascular:      Rate and Rhythm: Normal rate and regular rhythm. No clicks rubs or gallops, normal S1 and S2.No peripheral stigmata of endocarditis appreciated.     Pulmonary:      Lungs appeared clear to auscultation, no adventitious sound appreciated.  Abdominal:      General: Abdomen soft, nontender, active bowel sounds, no involuntary guarding or rebound tenderness appreciated.     Comments: None   Musculoskeletal:       Patient appeared to have full active range of motion for upper and lower extremities, no acute apparent joint deformity appreciated on examination.   No pitting edema or cyanosis appreciated.       Lymphadenopathy:      No appreciable palpable lymphadenopathy  Skin:     General: Skin is warm.      Coloration:  No jaundice     Findings: No abnormal appearing skin rashes or lesions that appeared acute noted on unclothed area of the skin..   Neurological:      General: No focal sensory or motor deficits appreciated, no meningeal signs or dysmetria noted.      Cranial Nerves: Cranial nerves II to XII appearing grossly intact.     Genitals:  Deferred  Psychiatric:         The patient appears to be displaying normal mood and affect at the time of evaluation.     Labs:               Lab Results   Component Value Date     GLUCOSE 102 (H) 09/21/2024     CALCIUM 9.2 09/21/2024      09/21/2024     K 3.8 09/21/2024     CO2 25 09/21/2024      09/21/2024     BUN 46 (H) 09/21/2024     CREATININE 2.60 (H) 09/21/2024                Lab Results   Component Value Date     WBC 3.2 (L) 09/21/2024     HGB 7.4 (L) 09/21/2024     HCT 22.2 (L) 09/21/2024     MCV 95 09/21/2024      09/21/2024            Lab Results   Component Value Date     GLUCOSE 107 (H) 09/22/2024     CALCIUM 8.5 (L)  09/22/2024      09/22/2024     K 4.2 09/22/2024     CO2 26 09/22/2024      09/22/2024     BUN 26 (H) 09/22/2024     CREATININE 2.28 (H) 09/22/2024            Lab Results   Component Value Date     WBC 2.8 (L) 09/22/2024     HGB 7.2 (L) 09/22/2024     HCT 20.9 (L) 09/22/2024     MCV 95 09/22/2024      09/22/2024      [unfilled]   [unfilled]   Susceptibility data from last 90 days.  Collected Specimen Info Organism Amoxicillin/Clavulanate Ampicillin Ampicillin/Sulbactam Cefazolin Cefazolin (uncomplicated UTIs only) Ciprofloxacin Gentamicin Nitrofurantoin Piperacillin/Tazobactam Trimethoprim/Sulfamethoxazole   09/16/24 Urine from Clean Catch/Voided Escherichia coli  S  R  I  S  S  S  S  S  S  S                   X-rays/ Images     [unfilled]   Radiology Results (last 21 days)     Procedure Component Value Units Date/Time   CT abdomen pelvis wo IV contrast [[id number]] Collected: 09/20/24 1322   Order Status: Completed Updated: 09/20/24 1322   Narrative:     Interpreted By:  Modesto Wu,  STUDY:  CT ABDOMEN PELVIS WO IV CONTRAST; ;  9/20/2024 12:49 pm      INDICATION:  Signs/Symptoms:flank pain.          COMPARISON:  05/19/2024      ACCESSION NUMBER(S):  [id number]      ORDERING CLINICIAN:  SHAYAN DWYER      TECHNIQUE:  Serial axial unenhanced CT images obtained of the abdomen pelvis.  Images reformatted in the coronal and sagittal projection      All CT examinations are performed with 1 or more of the following  dose reduction techniques: Automated exposure control, adjustment of  mA and/or kv according to patient's size, or use of iterative  reconstruction techniques.      FINDINGS:  Included lung bases demonstrate mild chronic appearing basilar  interstitial changes. No infiltrate or effusion identified. Distal  esophagus is unremarkable      Liver is unremarkable within limits of this unenhanced CT      Spleen and adrenal glands are unremarkable      Interval removal of the  previously noted cholecystostomy tube with  surgical staples in the right upper quadrant status post  cholecystectomy.      Pancreas is unremarkable. There is a focal calcification within the  pancreatic head at the margin with 2nd portion of the duodenum. This  appears separate from the area of the ampulla and distribution of the  common bile duct. Otherwise, pancreas is unremarkable within limits  of this unenhanced CT.      Right kidney demonstrates no hydronephrosis or nephrolithiasis.  Visualized course of the right ureter is unremarkable      Left kidney demonstrates no hydronephrosis or nephrolithiasis.  Visualized course of the left ureter is unremarkable.      Retroperitoneum demonstrates diffuse vascular calcification of the  abdominal aorta. There is no lymphadenopathy. No ascites demonstrated      Loops of large bowel demonstrate uncomplicated sigmoid  diverticulosis. Descending colon is contracted limiting  characterization. No pericolonic fat stranding demonstrated. Small  bowel loops are nondilated. There is no lymphadenopathy in the  mesentery. Stomach is contracted limiting characterization.      CT pelvis:      Unopacified bladder demonstrates mild thick-walled appearance in  relation to contraction. There is no stranding of the perivesical  fat. No pelvic lymphadenopathy. No free fluid demonstrated. Status  post hysterectomy.      Visualized osseous structures demonstrates spinal fusion from L2  through L5 with interbody spacer placement at L5/S1. Laminectomy  changes are demonstrated from L3 through L5. Limited characterization  of the thecal sac within limits of the CT. However, no significant  stenosis demonstrated.                               Impression:     1. No obstructive uropathy. No nephrolithiasis demonstrated      2. Interval removal of cholecystostomy tube with cholecystectomy  demonstrated. No stranding of the fat      3. Spinal fusion lower lumbar spine with laminectomy changes  with  artifact limiting characterization of the thecal sac. However, no  significant narrowing within limits of the CT.      4. Uncomplicated sigmoid diverticulosis.          MACRO:  None      Signed by: Modesto Wu 9/20/2024 1:20 PM  Dictation workstation:   [id number]   XR chest 1 view [[id number]] Collected: 09/20/24 1140   Order Status: Completed Updated: 09/20/24 1248   Narrative:     Interpreted By:  Manuel Callaway,  STUDY:  XR CHEST 1 VIEW      INDICATION:  Signs/Symptoms:cough.      COMPARISON:  September 16, 2024      ACCESSION NUMBER(S):  [id number]      ORDERING CLINICIAN:  SHAYAN DWYER      FINDINGS:  Right-sided central line again noted SVC right atrial junction  unchanged. No consolidation, effusion, edema, or pneumothorax.       Impression:     No evidence of acute intrathoracic abnormality.      Signed by: Manuel Callaway 9/20/2024 11:39 AM  Dictation workstation:   [id number]                  Medical Problems         Problem List         * (Principal) Generalized weakness     Lumbago     A-fib (Multi)     Anxiety     Chronic diastolic heart failure of unknown etiology (Multi) (Chronic)     Cervical spondylosis without myelopathy     Coronary artery disease     Degeneration of intervertebral disc of lumbar region     Hypertension, essential (Chronic)     Frequent falls     GERD (gastroesophageal reflux disease) (Chronic)     Hyperlipidemia (Chronic)     Inability to ambulate due to multiple joints     Jerking movements of extremities     Spinal stenosis of lumbar region     Lumbar spondylosis     Macrocytic anemia     Myelodysplastic syndrome (Multi) (Chronic)     Myofascial pain syndrome     Occasional tremors     Osteoporosis     Stage 4 chronic kidney disease (Multi) (Chronic)     Weakness generalized     Gout     Scoliosis of lumbar region due to degenerative disease of spine in adult     Depression     Lower extremity edema     Diabetes mellitus with complication (Multi)     COPD  without exacerbation (Multi) (Chronic)     Primary osteoarthritis of right knee     Fall     Acute kidney failure, unspecified (CMS-[facility])     Muscle weakness (generalized)     Primary osteoarthritis     Repeated falls     Paroxysmal atrial fibrillation (Multi)     Chronic diastolic (congestive) heart failure (Multi)     Chronic obstructive pulmonary disease, unspecified (Multi)     CKD (chronic kidney disease)     Normocytic anemia     Essential (primary) hypertension     HLD (hyperlipidemia)     Type 2 diabetes mellitus with hyperglycemia, without long-term current use of insulin (Multi)     Type 2 diabetes mellitus without complications (Multi)     Obesity (BMI 30-39.9)     Cellulitis of toe of right foot     Acute kidney injury (nontraumatic) (CMS-HCC)     ESRD (end stage renal disease) on dialysis (Multi) (Chronic)     Anemia due to chronic kidney disease, on chronic dialysis (Multi) (Chronic)     Thickening of wall of gallbladder     Allergy, unspecified, sequela     Anaphylactic shock, unspecified, sequela     Articular cartilage disorder of hip     Bone marrow disorder     Coagulation defect, unspecified (Multi)     Contact with and (suspected) exposure to covid-19     Fracture of bone of hip (Multi)     History of anemia     Hyperkalemia     Pain of lower extremity     Pain, unspecified     Plantar fasciitis     Pneumonia due to infectious organism     Chest pain     Acute pulmonary edema (Multi)     Elevated bilirubin     Atrial fibrillation with rapid ventricular response (Multi)     Calculus of gallbladder with acute cholecystitis with obstruction     Generalized abdominal pain     Acute on chronic anemia     Hypokalemia                  Above medical problems may be reflective of historical medical problems that may have resolved and may not related to acute clinical condition/medical problems.     Clinical impression/plan:        Assessment/Plan  79 y.o. female with PMHx s/f ESRD on HD ([facility]),  hypertension, hyperlipidemia, CAD, HFpEF, COPD (no baseline O2), NIDDM2, paroxysmal atrial fibrillation on eliquis, chronic anemia of ESRD and myelodysplastic syndrome requiring MARILYNN and intermittent PRBC infusions, osteoarthritis, anxiety, depression, GERD presenting with generalized weakness.      Generalized weakness  Ambulatory dysfunction  -likely 2/2 UTI with underlying chronic anemia  -PT/OT appreciated  -started on gentle LR hydration of 75cc/h x 14 hrs     UTI  -UA improving compared to 9/16  -urine culture on 9/16 shows E. Coli  -continuing with oral levofloxacin  -does not meet sepsis criteria at admission     Acute on chronic anemia of ESRD  -Hgb 7.0 at admission  -type and screen pending, consider starting RBC transfusion once type and screen is back  -denies acute blood loss, no melena, hematochezia, hemoptysis     ESRD on HD (MWF schedule, received her last dialysis on 9/13/24)  -Nephrology consult  9/23: Dialysis session today.     HTN, HLD  -Continue home medications   -Monitor and adjust as needed while admitted      NIDDM-II   -Hold home oral meds for now   -Continue with SSI ACHS   -Accucheks, hypoglycemic protocol   -Monitor and adjust as needed      Paroxysmal A-fib  -Continue on Coreg for rate control  -Hold Eliquis for now due to low hemoglobin     Diet: cardiac  DVT Prophylaxis: SCDs  Code Status: full code        Disposition/additional care plan/interventions: 9/21/2024                 Care plan discussed in detail with patient and patient family at the bedside.  Diagnosis differential discussed in detail with patient and patient family at the bedside.     Patient's daughter also had patient's son on the phone during interview.  All family's questions answered.     Await nephrology evaluation and treatment patient has missed dialysis.  Appears x 1 week.     Reported myelodysplastic syndrome.     Anemia............... treated with blood transfusion yesterday     Physical therapy  evaluatio,patient's daughter stated patient's was doing exceptionally well before this recent UTI.                 Patient transition to [facility] outpatient it appeared by family physician a.  Susceptibility to of E. coli related UTI.   Reviewed............... for quinolone sensitive.     Urine Culture   >100,000 Escherichia coli Abnormal            Resulting Agency: [facility]     Susceptibility         Escherichia coli    MICROSCAN   Amoxicillin/Clavulanate Susceptible   Ampicillin Resistant   Ampicillin/Sulbactam Intermediate   Cefazolin Susceptible   Cefazolin (uncomplicated UTIs only) Susceptible   Ciprofloxacin Susceptible   Gentamicin Susceptible   Nitrofurantoin Susceptible   Piperacillin/Tazobactam Susceptible   Trimethoprim/Sulfamethoxazole Susceptibl         Patient reported malaise and overall fatigue appears multifactorial blood transfusion implemented patient on antibiotic therapy, pathogen directed.     Renal replacement therapy needed await recommendations from nephrology.     Patient state that she does not like dialysis......................... patient informed of the importance of renal replacement therapy.      Disposition/additional care plan/interventions: 9/22/2024           Continue pathogen directed therapy for urinary tract infection.     Schedule dialysis days, Monday Wednesday and Friday.     CBC in a.m.     Resume Eliquis that was held with plan to continue Eliquis on discharge monitor blood clot while on Eliquis.  No overt signs of bleeding apparent at this juncture.     Blood transfusion as needed for hemoglobin less than 7.     Outpatient follow-up with hematology oncology concerning myelodysplastic syndrome advised.     Physical therapy evaluation before discharge.              Addendum: Later this afternoon I was contacted by physical therapist who stated that patient ambulates with assistive device at home appeared at her baseline and should be good to go from a physical therapy  standpoint.  Anticipate discharge tomorrow after dialysis will check CBC and BMP in the a.m., Eliquis will be resumed today.     The patient was informed of differential diagnosis , work up , plan of care and possible sequelae of clinical disposition.Patient in agreement with plan of care. Further recommendations forthcoming in accordance with patient's clinical disposition and response to care.     Discharge planning: Discharge timing to be determined.     Care time: > 35 mins              Dictation performed with assistance of voice recognition device therefore transcription errors are possible.                        Pertinent Physical Exam At Time of Discharge  Physical Exam    Home Medications     Medication List      CHANGE how you take these medications     levoFLOXacin 250 mg tablet; Commonly known as: Levaquin; Take 1 tablet   (250 mg) by mouth every other day for 4 doses.; What changed: when to take   this     CONTINUE taking these medications     atorvastatin 10 mg tablet; Commonly known as: Lipitor; Take 1 tablet (10   mg) by mouth once daily.   carvedilol 25 mg tablet; Commonly known as: Coreg; Take 1 tablet (25 mg)   by mouth 2 times a day.   cholecalciferol 50 MCG (2000 UT) tablet; Commonly known as: Vitamin D-3   cyanocobalamin 1,000 mcg tablet; Commonly known as: Vitamin B-12   docusate sodium 100 mg capsule; Commonly known as: Colace   Eliquis 2.5 mg tablet; Generic drug: apixaban; Take 1 tablet (2.5 mg) by   mouth every 12 hours.   losartan 50 mg tablet; Commonly known as: Cozaar; Take 1 tablet (50 mg)   by mouth once daily.   pioglitazone 15 mg tablet; Commonly known as: Actos; Take 1 tablet (15   mg) by mouth once daily.     STOP taking these medications     cephalexin 500 mg capsule; Commonly known as: Keflex   oxyCODONE 5 mg immediate release tablet; Commonly known as: Roxicodone   pravastatin 40 mg tablet; Commonly known as: Pravachol   pregabalin 100 mg capsule; Commonly known as: Lyrica    SITagliptin phosphate 25 mg tablet; Commonly known as: Rajanuvia       Outpatient Follow-Up  PCP, nephrology, dialysis    Abhi Espana MD

## 2024-09-23 NOTE — PROGRESS NOTES
Social work consult placed for discharge planning. SW reviewed pt's chart and communicated with TCC. No SW needs foreseen at this time. SW signing off; available upon request.    Luc Bland, MSW, LSW ([id number])   Care Transitions

## 2024-09-23 NOTE — POST-PROCEDURE NOTE
Report to Receiving RN:    Report To: Saida Ward RN  Time Report Called: 1142  Hand-Off Communication:   t tolerated tx well. removed .5L .   Post vitals: 145/54 (60) - 65.5 kg - 97.3*F   Complications During Treatment: No  Ultrafiltration Treatment: Yes, HD  Medications Administered During Dialysis: No  Blood Products Administered During Dialysis: No  Labs Sent During Dialysis: No  Heparin Drip Rate Changes: N/A  Dialysis Catheter Dressing: Clean, dry & intact  Last Dressing Change: 09/21/2024    Electronic Signatures:  Macey Waller OCDT    Last Updated: 11:43 AM by MACEY WALLER

## 2024-09-24 ENCOUNTER — PATIENT OUTREACH (OUTPATIENT)
Dept: PRIMARY CARE | Facility: CLINIC | Age: 79
End: 2024-09-24
Payer: MEDICARE

## 2024-09-24 LAB — BACTERIA BLD CULT: NORMAL

## 2024-09-24 NOTE — PROGRESS NOTES
Discharge Facility: [facility]   Discharge Diagnosis: generalized weakness  Admission Date: 9/20/24  Discharge Date: 9/23/24    PCP Appointment Date: no appointment, message to office  Specialist Appointment Date: 10/1/24 cardiology  Hospital Encounter and Summary Linked: Yes    Two attempts were made to reach patient within two business days after discharge. Voicemail left with contact information for patient to call back with any non-emergent questions or concerns.

## 2024-09-25 PROBLEM — I50.30 (HFPEF) HEART FAILURE WITH PRESERVED EJECTION FRACTION: Status: ACTIVE | Noted: 2024-09-25

## 2024-09-25 NOTE — PROGRESS NOTES
[facility]    Patient Name: Tana Hargrove  Patient : 1945      Scribe Attestation  By signing my name below, IKrista Scribe   attest that this documentation has been prepared under the direction and in the presence of Humphrey Callaway DO.      Reason for visit:  This is an 79-year-old female here for follow-up regarding paroxysmal atrial fibrillation not on anticoagulation secondary to MDS/anemia, end-stage renal disease followed by Nephrology, HFpEF, hypertension, dyslipidemia, diabetes mellitus, and MDS/anemia.     HPI:  This is an 79-year-old female here for follow-up regarding paroxysmal atrial fibrillation not on anticoagulation secondary to MDS/anemia, end-stage renal disease followed by Nephrology, HFpEF, hypertension, dyslipidemia, diabetes mellitus, and MDS/anemia.  Patient was last evaluated by me in May 2024 at which time we discussed her preoperative cardiovascular risk prior to a cholecystectomy.  Patient subsequently had a hospitalization in 2024 at which time she was seen by the cardiology service for paroxysmal atrial fibrillation.  Patient was seen by the cardiology NP on 2024 at which time the patient had been placed on apixaban 2.5 mg twice daily after evaluation by hematology and patient was asked to follow-up in several months.  Patient was subsequently seen in the emergency department on 2024 with low hemoglobin.  CMP done in 2024 showed a serum sodium of 137, serum potassium 3.2, serum creatinine of 1.38 consistent with known CKD, normal ALT/AST, INR was 1.2.  CBC done 2024 was 7.5 up from 5.7 on 2024.  Eliquis was held during that admission. Patient had another hospitalization on 24 due to generalized weakness after she missing dialysis for 7 days. BMP done on 24 showed serum sodium of 134, serum potassium 3.7, serum creatinine of 3.10. CBC showed a hemoglobin of 7.4.  Echocardiogram done in  February 2024 showed normal left ventricular systolic function with an ejection fraction of 60%, grade 1 diastolic dysfunction, normal right ventricular systolic function, mild to moderate tricuspid valve regurgitation. ECG done today showed sinus bradycardia with a heart rate of 58 bpm.  The patient reports that she has been feeling generally well from the cardiac standpoint. She denies any new chest pain, shortness of breath, palpitations and lightheadedness. She states that she takes all of her medications as prescribed. During my exam, she was resting comfortably on the exam table.            Assessment/Plan:   1. Paroxysmal atrial fibrillation  The patient has a history of paroxysmal atrial fibrillation not on anticoagulation secondary to MDS/anemia.  She should continue metoprolol tartrate for heart rate control.  ECG done today showed sinus bradycardia with a heart rate of 58 bpm.    She denies chest pain, palpitations or lightheadedness.  Echocardiogram done in February 2024 showed normal left ventricular systolic function with an ejection fraction of 60%, grade 1 diastolic dysfunction, normal right ventricular systolic function, mild to moderate tricuspid valve regurgitation.   Recent lab works as noted in the HPI.   Lab works as noted below will be done in 6 months prior to his next visit.   Follow up in 6 months and sooner if necessary.      2. End-stage renal disease  Serum creatinine done in September 2024 was 3.10 consistent with known CKD.  Management as per Nephrology.     3. HFpEF  The patient has a history of HFpEF.  Echocardiogram done in February 2024 showed normal left ventricular systolic function with an ejection fraction of 60%, grade 1 diastolic dysfunction, normal right ventricular systolic function, mild to moderate tricuspid valve regurgitation.   He does not appear volume overloaded on exam today except for trace pitting edema.  He should continue current heart failure  medications.  Recent lab works as noted in the HPI.   Lab works as noted below will be done in 6 months prior to his next visit.   I discussed with him the importance of following a low-sodium heart healthy diet as well as weight loss.   Follow up in 6 months and sooner if necessary.      4. Hypertension  The patient has a history of hypertension and blood pressure appears controlled on exam today.   She should continue her current antihypertensive medications and monitor her blood pressure at home.     5. Dyslipidemia  Lipid panel done in May 2023  showed an LDL of 57 and triglycerides of 75, not currently on any lipid lowering medication.   I will update lipid panel in 6 months.  Please see lifestyle recommendations below.      6. Diabetes mellitus  Hemoglobin A1c done in March 2024 was 6.3%.  Management as per PCP.     7. Myelodysplastic syndrome/anemia  CBC done in September 2024 showed a hemoglobin of 7.4.   Patient is being followed by Hematology-Oncology.       Orders:  CMP/lipid/magnesium/CBC in 6 months,   Follow-up in 6 months.    Lifestyle Recommendations  I recommend a whole-food plant-based diet, an eating pattern that encourages the consumption of unrefined plant foods (such as fruits, vegetables, tubers, whole grains, legumes, nuts and seeds) and discourages meats, dairy products, eggs and processed foods.     The AHA/ACC recommends that the patient consume a dietary pattern that emphasizes intake of vegetables, fruits, and whole grains; includes low-fat dairy products, poultry, fish, legumes, non-tropical vegetable oils, and nuts; and limits intake of sodium, sweets, sugar-sweetened beverages, and red meats.  Adapt this dietary pattern to appropriate calorie requirements (a 500-750 kcal/day deficit to loose weight), personal and cultural food preferences, and nutrition therapy for other medical conditions (including diabetes).  Achieve this pattern by following plans such as the [place] Mediterranean,  DASH dietary pattern, or AHA diet.     Engage in 2 hours and 30 minutes per week of moderate-intensity physical activity, or 1 hour and 15 minutes (75 minutes) per week of vigorous-intensity aerobic physical activity, or an equivalent combination of moderate and vigorous-intensity aerobic physical activity. Aerobic activity should be performed in episodes of at least 10 minutes preferably spread throughout the week.     Adhering to a heart healthy diet, regular exercise habits, avoidance of tobacco products, and maintenance of a healthy weight are crucial components of their heart disease risk reduction.     Any positive review of systems not specifically addressed in the office visit today should be evaluated and treated by the patients primary care physician or in an emergency department if necessary     Patient was notified that results from ordered tests will be called to the patient if it changes current management; it will otherwise be discussed at a future appointment and available on  [employer].     Thank you for allowing me to participate in the care of this patient.        This document was generated using the assistance of voice recognition software. If there are any errors of spelling, grammar, syntax, or meaning; please feel free to contact me directly for clarification.    Past Medical History:  She has a past medical history of Abnormal levels of other serum enzymes, Acute kidney failure (CMS-HCC), Anemia, CKD (chronic kidney disease), COPD (chronic obstructive pulmonary disease) (Multi), Coronary artery disease, Disease of blood and blood-forming organs, unspecified, HLD (hyperlipidemia), Hypertension, MDS (myelodysplastic syndrome) (Multi), Personal history of other diseases of the musculoskeletal system and connective tissue, Personal history of other specified conditions, Personal history of other specified conditions, and Type 2 diabetes mellitus (Multi).    She has no past medical history of  Adverse effect of anesthesia, Arthritis, Asthma (HHS-HCC), Awareness under anesthesia, CHF (congestive heart failure) (Multi), Delayed emergence from general anesthesia, Disease of thyroid gland, Hard to intubate, History of transfusion, Malignant hyperthermia, PONV (postoperative nausea and vomiting), Pseudocholinesterase deficiency, Sickle cell anemia (Multi), Spinal headache, or Stroke (Multi).    Past Surgical History:  She has a past surgical history that includes Other surgical history (12/13/2019); Other surgical history (12/13/2019); Other surgical history (Bilateral, 12/13/2019); Other surgical history (Left, 12/13/2019); Other surgical history (12/13/2019); Other surgical history (12/13/2019); Other surgical history (Right, 12/13/2019); Other surgical history (04/16/2021); MR angio head wo IV contrast (11/20/2020); MR angio neck wo IV contrast (11/20/2020); Breast biopsy (Left); Hysterectomy; Breast lumpectomy; Appendectomy; Colonoscopy; Oophorectomy; Joint replacement; and Cholecystectomy (06/06/2024).      Social History:  She reports that she has never smoked. She has never been exposed to tobacco smoke. She has never used smokeless tobacco. She reports that she does not currently use alcohol. She reports that she does not use drugs.    Family History:  Family History   Problem Relation Name Age of Onset    Heart disease Mother      Heart attack Father      Hodgkin's lymphoma Son          Allergies:  Codeine, Hydrocodone, Hydrocodone-acetaminophen, Tramadol, Piperacillin-tazobactam, Adhesive tape-silicones, and Meperidine    Outpatient Medications:  Current Outpatient Medications   Medication Instructions    atorvastatin (LIPITOR) 10 mg, oral, Daily    carvedilol (COREG) 25 mg, oral, 2 times daily    cholecalciferol (Vitamin D-3) 50 MCG (2000 UT) tablet 1 tablet, oral, Daily    cyanocobalamin (Vitamin B-12) 1,000 mcg tablet 1 tablet, oral, Daily    docusate sodium (COLACE) 100 mg, oral, 2 times daily     Eliquis 2.5 mg, oral, Every 12 hours    levoFLOXacin (LEVAQUIN) 250 mg, oral, Every 48 hours    losartan (COZAAR) 50 mg, oral, Daily    pioglitazone (ACTOS) 15 mg, oral, Daily        ROS:  A 14 point review of systems was done and is negative other than as stated in HPI    Vitals:      9/23/2024     5:57 AM 9/23/2024     8:30 AM 9/23/2024    11:36 AM 9/23/2024     2:48 PM 9/23/2024     3:28 PM 9/23/2024     3:29 PM 9/23/2024     3:30 PM   Vitals   Systolic 145   111 114 117 101   Diastolic 71   59 55 68 65   Heart Rate 58 55 60 63 64 65 59   Temp 36.2 °C (97.1 °F) 36.2 °C (97.1 °F) 36.3 °C (97.3 °F) 35.6 °C (96 °F)      Resp 16   20 20 20 20        Physical Exam:   Constitutional: Cooperative, in no acute distress, alert, appears stated age.   Skin: Skin color, texture, turgor normal. No rashes or lesions.   Head: Normocephalic. No masses, lesions, tenderness or abnormalities   Eyes: Extraocular movements are grossly intact.   Mouth and throat: Mucous membranes moist   Neck: Neck supple, no carotid bruits, no JVD   Respiratory: Lungs clear to auscultation, no wheezing or rhonchi, no use of accessory muscles   Chest wall: No scars, normal excursion with respiration   Cardiovascular: Regular rhythm with + murmur  Gastrointestinal: Abdomen soft, nontender. Bowel sounds normal.   Musculoskeletal: Strength equal in upper extremities   Extremities: Trace pitting edema   Neurologic: Sensation grossly intact, alert and oriented x3    Intake/Output:   No intake/output data recorded.    Outpatient Medications  Current Outpatient Medications on File Prior to Visit   Medication Sig Dispense Refill    apixaban (Eliquis) 2.5 mg tablet Take 1 tablet (2.5 mg) by mouth every 12 hours. 60 tablet 0    atorvastatin (Lipitor) 10 mg tablet Take 1 tablet (10 mg) by mouth once daily. 30 tablet 2    carvedilol (Coreg) 25 mg tablet Take 1 tablet (25 mg) by mouth 2 times a day. 60 tablet 1    cholecalciferol (Vitamin D-3) 50 MCG (2000 UT)  tablet Take 1 tablet (2,000 Units) by mouth once daily.      cyanocobalamin (Vitamin B-12) 1,000 mcg tablet Take 1 tablet (1,000 mcg) by mouth once daily.      docusate sodium (Colace) 100 mg capsule Take 1 capsule (100 mg) by mouth 2 times a day.      levoFLOXacin (Levaquin) 250 mg tablet Take 1 tablet (250 mg) by mouth every other day for 4 doses.      losartan (Cozaar) 50 mg tablet Take 1 tablet (50 mg) by mouth once daily. 30 tablet 2    pioglitazone (Actos) 15 mg tablet Take 1 tablet (15 mg) by mouth once daily. 30 tablet 3    [DISCONTINUED] cephalexin (Keflex) 500 mg capsule Take 1 capsule (500 mg) by mouth 2 times a day for 5 days. 10 capsule 0    [DISCONTINUED] levoFLOXacin (Levaquin) 250 mg tablet Take 1 tablet (250 mg) by mouth once daily for 10 days. 10 tablet 0    [DISCONTINUED] oxyCODONE (Roxicodone) 5 mg immediate release tablet Take 1 tablet (5 mg) by mouth every 6 hours if needed for severe pain (7 - 10). (Patient not taking: Reported on 9/18/2024) 15 tablet 0    [DISCONTINUED] pravastatin (Pravachol) 40 mg tablet Take 1 tablet (40 mg) by mouth early in the morning..      [DISCONTINUED] pregabalin (Lyrica) 100 mg capsule TAKE ONE CAPSULE BY MOUTH TWICE DAILY @9AM & 5PM 60 capsule 0    [DISCONTINUED] SITagliptin phosphate (Januvia) 25 mg tablet Take 1 tablet (25 mg) by mouth once daily.       No current facility-administered medications on file prior to visit.       Labs: (past 26 weeks)  Recent Results (from the past 4368 hour(s))   ECG 12 lead    Collection Time: 04/13/24  1:20 AM   Result Value Ref Range    Ventricular Rate 86 BPM    Atrial Rate 87 BPM    IL Interval 220 ms    QRS Duration 96 ms    QT Interval 421 ms    QTC Calculation(Bazett) 504 ms    P Axis 64 degrees    R Axis 20 degrees    T Axis 73 degrees    QRS Count 14 beats    Q Onset 249 ms    T Offset 460 ms    QTC Fredericia 475 ms   CBC and Auto Differential    Collection Time: 04/13/24  1:33 AM   Result Value Ref Range    WBC 7.6 4.4  - 11.3 x10*3/uL    nRBC 0.0 0.0 - 0.0 /100 WBCs    RBC 2.30 (L) 4.00 - 5.20 x10*6/uL    Hemoglobin 7.4 (L) 12.0 - 16.0 g/dL    Hematocrit 22.4 (L) 36.0 - 46.0 %    MCV 97 80 - 100 fL    MCH 32.2 26.0 - 34.0 pg    MCHC 33.0 32.0 - 36.0 g/dL    RDW 23.4 (H) 11.5 - 14.5 %    Platelets 228 150 - 450 x10*3/uL    Neutrophils % 76.0 40.0 - 80.0 %    Immature Granulocytes %, Automated 0.5 0.0 - 0.9 %    Lymphocytes % 13.7 13.0 - 44.0 %    Monocytes % 8.2 2.0 - 10.0 %    Eosinophils % 1.2 0.0 - 6.0 %    Basophils % 0.4 0.0 - 2.0 %    Neutrophils Absolute 5.77 (H) 1.60 - 5.50 x10*3/uL    Immature Granulocytes Absolute, Automated 0.04 0.00 - 0.50 x10*3/uL    Lymphocytes Absolute 1.04 0.80 - 3.00 x10*3/uL    Monocytes Absolute 0.62 0.05 - 0.80 x10*3/uL    Eosinophils Absolute 0.09 0.00 - 0.40 x10*3/uL    Basophils Absolute 0.03 0.00 - 0.10 x10*3/uL   Basic metabolic panel    Collection Time: 04/13/24  1:33 AM   Result Value Ref Range    Glucose 200 (H) 74 - 99 mg/dL    Sodium 134 (L) 136 - 145 mmol/L    Potassium 3.5 3.5 - 5.3 mmol/L    Chloride 97 (L) 98 - 107 mmol/L    Bicarbonate 31 21 - 32 mmol/L    Anion Gap 10 10 - 20 mmol/L    Urea Nitrogen 16 6 - 23 mg/dL    Creatinine 1.46 (H) 0.50 - 1.05 mg/dL    eGFR 36 (L) >60 mL/min/1.73m*2    Calcium 8.8 8.6 - 10.3 mg/dL   Lipase    Collection Time: 04/13/24  1:33 AM   Result Value Ref Range    Lipase 36 9 - 82 U/L   Hepatic function panel    Collection Time: 04/13/24  1:33 AM   Result Value Ref Range    Albumin 4.0 3.4 - 5.0 g/dL    Bilirubin, Total 0.7 0.0 - 1.2 mg/dL    Bilirubin, Direct 0.1 0.0 - 0.3 mg/dL    Alkaline Phosphatase 70 33 - 136 U/L    ALT 16 7 - 45 U/L    AST 23 9 - 39 U/L    Total Protein 6.4 6.4 - 8.2 g/dL   Lactate    Collection Time: 04/13/24  1:33 AM   Result Value Ref Range    Lactate 1.1 0.4 - 2.0 mmol/L   B-Type Natriuretic Peptide    Collection Time: 04/13/24  1:33 AM   Result Value Ref Range     (H) 0 - 99 pg/mL   Blood Gas Venous Full Panel     Collection Time: 04/13/24  1:33 AM   Result Value Ref Range    POCT pH, Venous 7.44 (H) 7.33 - 7.43 pH    POCT pCO2, Venous 46 41 - 51 mm Hg    POCT pO2, Venous 47 (H) 35 - 45 mm Hg    POCT SO2, Venous 78 (H) 45 - 75 %    POCT Oxy Hemoglobin, Venous 76.2 (H) 45.0 - 75.0 %    POCT Hematocrit Calculated, Venous 21.0 (L) 36.0 - 46.0 %    POCT Sodium, Venous 134 (L) 136 - 145 mmol/L    POCT Potassium, Venous 3.4 (L) 3.5 - 5.3 mmol/L    POCT Chloride, Venous 99 98 - 107 mmol/L    POCT Ionized Calicum, Venous 1.17 1.10 - 1.33 mmol/L    POCT Glucose, Venous 204 (H) 74 - 99 mg/dL    POCT Lactate, Venous 1.1 0.4 - 2.0 mmol/L    POCT Base Excess, Venous 6.4 (H) -2.0 - 3.0 mmol/L    POCT HCO3 Calculated, Venous 31.2 (H) 22.0 - 26.0 mmol/L    POCT Hemoglobin, Venous 7.0 (L) 12.0 - 16.0 g/dL    POCT Anion Gap, Venous 7.0 (L) 10.0 - 25.0 mmol/L    Patient Temperature 37.0 degrees Celsius    FiO2 21 %   Troponin I, High Sensitivity, Initial    Collection Time: 04/13/24  1:33 AM   Result Value Ref Range    Troponin I, High Sensitivity 14 (H) 0 - 13 ng/L   Morphology    Collection Time: 04/13/24  1:33 AM   Result Value Ref Range    RBC Morphology See Below     Polychromasia Mild     Hypochromia Mild     Target Cells Few     Ovalocytes Few    Troponin, High Sensitivity, 1 Hour    Collection Time: 04/13/24  2:29 AM   Result Value Ref Range    Troponin I, High Sensitivity 16 (H) 0 - 13 ng/L   CBC    Collection Time: 04/13/24  5:53 AM   Result Value Ref Range    WBC 10.5 4.4 - 11.3 x10*3/uL    nRBC 0.0 0.0 - 0.0 /100 WBCs    RBC 2.38 (L) 4.00 - 5.20 x10*6/uL    Hemoglobin 7.7 (L) 12.0 - 16.0 g/dL    Hematocrit 23.4 (L) 36.0 - 46.0 %    MCV 98 80 - 100 fL    MCH 32.4 26.0 - 34.0 pg    MCHC 32.9 32.0 - 36.0 g/dL    RDW 23.7 (H) 11.5 - 14.5 %    Platelets 255 150 - 450 x10*3/uL   Renal function panel    Collection Time: 04/13/24  5:53 AM   Result Value Ref Range    Glucose 179 (H) 74 - 99 mg/dL    Sodium 135 (L) 136 - 145 mmol/L     Potassium 3.6 3.5 - 5.3 mmol/L    Chloride 97 (L) 98 - 107 mmol/L    Bicarbonate 29 21 - 32 mmol/L    Anion Gap 13 10 - 20 mmol/L    Urea Nitrogen 17 6 - 23 mg/dL    Creatinine 1.57 (H) 0.50 - 1.05 mg/dL    eGFR 33 (L) >60 mL/min/1.73m*2    Calcium 9.3 8.6 - 10.3 mg/dL    Phosphorus 3.8 2.5 - 4.9 mg/dL    Albumin 4.0 3.4 - 5.0 g/dL   Magnesium    Collection Time: 04/13/24  5:53 AM   Result Value Ref Range    Magnesium 1.77 1.60 - 2.40 mg/dL   POCT GLUCOSE    Collection Time: 04/13/24  7:03 AM   Result Value Ref Range    POCT Glucose 168 (H) 74 - 99 mg/dL   POCT GLUCOSE    Collection Time: 04/13/24 11:15 AM   Result Value Ref Range    POCT Glucose 104 (H) 74 - 99 mg/dL   POCT GLUCOSE    Collection Time: 04/13/24  4:14 PM   Result Value Ref Range    POCT Glucose 147 (H) 74 - 99 mg/dL   POCT GLUCOSE    Collection Time: 04/13/24  8:45 PM   Result Value Ref Range    POCT Glucose 133 (H) 74 - 99 mg/dL   POCT GLUCOSE    Collection Time: 04/14/24  6:22 AM   Result Value Ref Range    POCT Glucose 117 (H) 74 - 99 mg/dL   POCT GLUCOSE    Collection Time: 04/14/24 11:29 AM   Result Value Ref Range    POCT Glucose 182 (H) 74 - 99 mg/dL   Urinalysis with Reflex Culture and Microscopic    Collection Time: 04/14/24  2:19 PM   Result Value Ref Range    Color, Urine Yellow Straw, Yellow    Appearance, Urine Hazy (N) Clear    Specific Gravity, Urine 1.011 1.005 - 1.035    pH, Urine 5.0 5.0, 5.5, 6.0, 6.5, 7.0, 7.5, 8.0    Protein, Urine >=500 (3+) (N) NEGATIVE mg/dL    Glucose, Urine NEGATIVE NEGATIVE mg/dL    Blood, Urine NEGATIVE NEGATIVE    Ketones, Urine NEGATIVE NEGATIVE mg/dL    Bilirubin, Urine NEGATIVE NEGATIVE    Urobilinogen, Urine 2.0 (N) <2.0 mg/dL    Nitrite, Urine NEGATIVE NEGATIVE    Leukocyte Esterase, Urine MODERATE (2+) (A) NEGATIVE   Microscopic Only, Urine    Collection Time: 04/14/24  2:19 PM   Result Value Ref Range    WBC, Urine >50 (A) 1-5, NONE /HPF    WBC Clumps, Urine MANY Reference range not established.  /HPF    RBC, Urine 6-10 (A) NONE, 1-2, 3-5 /HPF    Squamous Epithelial Cells, Urine 1-9 (SPARSE) Reference range not established. /HPF    Renal Epithelial Cells, Urine 3-5 (1+) Reference range not established. /HPF    Bacteria, Urine 4+ (A) NONE SEEN /HPF    Mucus, Urine 1+ Reference range not established. /LPF    Amorphous Crystals, Urine 1+ NONE, 1+, 2+ /HPF   Urine Culture    Collection Time: 04/14/24  2:19 PM    Specimen: Clean Catch/Voided; Urine   Result Value Ref Range    Urine Culture >100,000 Escherichia coli (A)        Susceptibility    Escherichia coli - MICROSCAN     Ampicillin  Resistant      Amoxicillin/Clavulanate  Susceptible      Ampicillin/Sulbactam  Intermediate      Cefazolin  Susceptible      Cefazolin (uncomplicated UTIs only)  Susceptible      Ciprofloxacin  Susceptible      Gentamicin  Susceptible      Nitrofurantoin  Susceptible      Piperacillin/Tazobactam  Susceptible      Trimethoprim/Sulfamethoxazole  Susceptible    Electrocardiogram, 12-lead PRN ACS symptoms    Collection Time: 04/14/24  2:50 PM   Result Value Ref Range    Ventricular Rate 75 BPM    Atrial Rate 75 BPM    WV Interval 169 ms    QRS Duration 87 ms    QT Interval 413 ms    QTC Calculation(Bazett) 462 ms    P Axis 49 degrees    R Axis 15 degrees    T Axis 91 degrees    QRS Count 12 beats    Q Onset 249 ms    T Offset 456 ms    QTC Fredericia 444 ms   POCT GLUCOSE    Collection Time: 04/14/24  4:17 PM   Result Value Ref Range    POCT Glucose 95 74 - 99 mg/dL   POCT GLUCOSE    Collection Time: 04/14/24 10:31 PM   Result Value Ref Range    POCT Glucose 229 (H) 74 - 99 mg/dL   POCT GLUCOSE    Collection Time: 04/15/24  6:43 AM   Result Value Ref Range    POCT Glucose 110 (H) 74 - 99 mg/dL   Drug Screen, Urine With Reflex to Confirmation    Collection Time: 04/16/24 12:05 PM   Result Value Ref Range    Amphetamine Screen, Urine Presumptive Negative Presumptive Negative    Barbiturate Screen, Urine Presumptive Negative Presumptive  Negative    Benzodiazepines Screen, Urine Presumptive Negative Presumptive Negative    Cannabinoid Screen, Urine Presumptive Negative Presumptive Negative    Cocaine Metabolite Screen, Urine Presumptive Negative Presumptive Negative    Fentanyl Screen, Urine Presumptive Negative Presumptive Negative    Opiate Screen, Urine Presumptive Negative Presumptive Negative    Oxycodone Screen, Urine Presumptive Negative Presumptive Negative    PCP Screen, Urine Presumptive Negative Presumptive Negative    Methadone Screen, Urine Presumptive Negative Presumptive Negative   ECG 12 lead    Collection Time: 04/18/24 10:56 AM   Result Value Ref Range    Ventricular Rate 71 BPM    Atrial Rate 70 BPM    OR Interval 180 ms    QRS Duration 91 ms    QT Interval 456 ms    QTC Calculation(Bazett) 496 ms    P Axis 46 degrees    R Axis 6 degrees    T Axis 88 degrees    QRS Count 11 beats    Q Onset 249 ms    T Offset 477 ms    QTC Fredericia 482 ms   CBC and Auto Differential    Collection Time: 04/18/24 11:00 AM   Result Value Ref Range    WBC 5.0 4.4 - 11.3 x10*3/uL    nRBC 0.0 0.0 - 0.0 /100 WBCs    RBC 2.02 (L) 4.00 - 5.20 x10*6/uL    Hemoglobin 6.4 (LL) 12.0 - 16.0 g/dL    Hematocrit 20.4 (L) 36.0 - 46.0 %     (H) 80 - 100 fL    MCH 31.7 26.0 - 34.0 pg    MCHC 31.4 (L) 32.0 - 36.0 g/dL    RDW 23.3 (H) 11.5 - 14.5 %    Platelets 203 150 - 450 x10*3/uL    Neutrophils % 65.6 40.0 - 80.0 %    Immature Granulocytes %, Automated 0.4 0.0 - 0.9 %    Lymphocytes % 20.0 13.0 - 44.0 %    Monocytes % 11.0 2.0 - 10.0 %    Eosinophils % 2.6 0.0 - 6.0 %    Basophils % 0.4 0.0 - 2.0 %    Neutrophils Absolute 3.29 1.60 - 5.50 x10*3/uL    Immature Granulocytes Absolute, Automated 0.02 0.00 - 0.50 x10*3/uL    Lymphocytes Absolute 1.00 0.80 - 3.00 x10*3/uL    Monocytes Absolute 0.55 0.05 - 0.80 x10*3/uL    Eosinophils Absolute 0.13 0.00 - 0.40 x10*3/uL    Basophils Absolute 0.02 0.00 - 0.10 x10*3/uL   Comprehensive metabolic panel    Collection  Time: 04/18/24 11:00 AM   Result Value Ref Range    Glucose 185 (H) 74 - 99 mg/dL    Sodium 136 136 - 145 mmol/L    Potassium 3.8 3.5 - 5.3 mmol/L    Chloride 99 98 - 107 mmol/L    Bicarbonate 31 21 - 32 mmol/L    Anion Gap 10 10 - 20 mmol/L    Urea Nitrogen 24 (H) 6 - 23 mg/dL    Creatinine 2.07 (H) 0.50 - 1.05 mg/dL    eGFR 24 (L) >60 mL/min/1.73m*2    Calcium 9.2 8.6 - 10.3 mg/dL    Albumin 3.7 3.4 - 5.0 g/dL    Alkaline Phosphatase 373 (H) 33 - 136 U/L    Total Protein 6.1 (L) 6.4 - 8.2 g/dL    AST 90 (H) 9 - 39 U/L    Bilirubin, Total 0.7 0.0 - 1.2 mg/dL    ALT 77 (H) 7 - 45 U/L   Lipase    Collection Time: 04/18/24 11:00 AM   Result Value Ref Range    Lipase 43 9 - 82 U/L   Morphology    Collection Time: 04/18/24 11:00 AM   Result Value Ref Range    RBC Morphology See Below     Hypochromia Mild     Basophilic Stippling Present    Prepare RBC: 1 Units    Collection Time: 04/18/24 12:05 PM   Result Value Ref Range    PRODUCT CODE [id number]     Unit Number [id number]     Unit ABO A     Unit RH NEG     XM INTEP COMP     Dispense Status TR     Blood Expiration Date April 25, 2024 23:59 EDT     PRODUCT BLOOD TYPE 0600     UNIT VOLUME 350    Type and Screen    Collection Time: 04/18/24 12:09 PM   Result Value Ref Range    ABO TYPE A     Rh TYPE POS     ANTIBODY SCREEN NEG    Urinalysis with Reflex Culture and Microscopic    Collection Time: 04/18/24  1:33 PM   Result Value Ref Range    Color, Urine Yellow Straw, Yellow    Appearance, Urine Clear Clear    Specific Gravity, Urine 1.006 1.005 - 1.035    pH, Urine 7.0 5.0, 5.5, 6.0, 6.5, 7.0, 7.5, 8.0    Protein, Urine 100 (2+) (N) NEGATIVE mg/dL    Glucose, Urine 50 (1+) (A) NEGATIVE mg/dL    Blood, Urine NEGATIVE NEGATIVE    Ketones, Urine NEGATIVE NEGATIVE mg/dL    Bilirubin, Urine NEGATIVE NEGATIVE    Urobilinogen, Urine <2.0 <2.0 mg/dL    Nitrite, Urine NEGATIVE NEGATIVE    Leukocyte Esterase, Urine NEGATIVE NEGATIVE   Extra Urine Gray Tube    Collection Time:  04/18/24  1:33 PM   Result Value Ref Range    Extra Tube Hold for add-ons.    Urinalysis Microscopic    Collection Time: 04/18/24  1:33 PM   Result Value Ref Range    WBC, Urine 1-5 1-5, NONE /HPF    RBC, Urine NONE NONE, 1-2, 3-5 /HPF    Squamous Epithelial Cells, Urine 1-9 (SPARSE) Reference range not established. /HPF   Hemoglobin and hematocrit, blood    Collection Time: 04/18/24  5:38 PM   Result Value Ref Range    Hemoglobin 8.2 (L) 12.0 - 16.0 g/dL    Hematocrit 24.8 (L) 36.0 - 46.0 %   CBC and Auto Differential    Collection Time: 04/19/24  5:26 AM   Result Value Ref Range    WBC 4.0 (L) 4.4 - 11.3 x10*3/uL    nRBC 0.0 0.0 - 0.0 /100 WBCs    RBC 2.25 (L) 4.00 - 5.20 x10*6/uL    Hemoglobin 7.0 (L) 12.0 - 16.0 g/dL    Hematocrit 21.9 (L) 36.0 - 46.0 %    MCV 97 80 - 100 fL    MCH 31.1 26.0 - 34.0 pg    MCHC 32.0 32.0 - 36.0 g/dL    RDW 23.9 (H) 11.5 - 14.5 %    Platelets 206 150 - 450 x10*3/uL    Neutrophils % 44.8 40.0 - 80.0 %    Immature Granulocytes %, Automated 0.3 0.0 - 0.9 %    Lymphocytes % 39.3 13.0 - 44.0 %    Monocytes % 9.8 2.0 - 10.0 %    Eosinophils % 5.3 0.0 - 6.0 %    Basophils % 0.5 0.0 - 2.0 %    Neutrophils Absolute 1.78 1.60 - 5.50 x10*3/uL    Immature Granulocytes Absolute, Automated 0.01 0.00 - 0.50 x10*3/uL    Lymphocytes Absolute 1.56 0.80 - 3.00 x10*3/uL    Monocytes Absolute 0.39 0.05 - 0.80 x10*3/uL    Eosinophils Absolute 0.21 0.00 - 0.40 x10*3/uL    Basophils Absolute 0.02 0.00 - 0.10 x10*3/uL   Comprehensive Metabolic Panel    Collection Time: 04/19/24  5:26 AM   Result Value Ref Range    Glucose 104 (H) 74 - 99 mg/dL    Sodium 141 136 - 145 mmol/L    Potassium 3.9 3.5 - 5.3 mmol/L    Chloride 103 98 - 107 mmol/L    Bicarbonate 29 21 - 32 mmol/L    Anion Gap 13 10 - 20 mmol/L    Urea Nitrogen 27 (H) 6 - 23 mg/dL    Creatinine 2.18 (H) 0.50 - 1.05 mg/dL    eGFR 22 (L) >60 mL/min/1.73m*2    Calcium 9.6 8.6 - 10.3 mg/dL    Albumin 3.4 3.4 - 5.0 g/dL    Alkaline Phosphatase 292 (H) 33  - 136 U/L    Total Protein 5.8 (L) 6.4 - 8.2 g/dL    AST 38 9 - 39 U/L    Bilirubin, Total 0.6 0.0 - 1.2 mg/dL    ALT 58 (H) 7 - 45 U/L   Magnesium    Collection Time: 04/19/24  5:26 AM   Result Value Ref Range    Magnesium 2.00 1.60 - 2.40 mg/dL   Morphology    Collection Time: 04/19/24  5:26 AM   Result Value Ref Range    RBC Morphology See Below     Hypochromia Mild     Ovalocytes Few    Cardiac Enzymes - CPK    Collection Time: 04/22/24  3:11 PM   Result Value Ref Range    Creatine Kinase 38 0 - 215 U/L   CBC and Auto Differential    Collection Time: 04/22/24  4:01 PM   Result Value Ref Range    WBC 13.7 (H) 4.4 - 11.3 x10*3/uL    nRBC 0.3 (H) 0.0 - 0.0 /100 WBCs    RBC 2.18 (L) 4.00 - 5.20 x10*6/uL    Hemoglobin 7.0 (L) 12.0 - 16.0 g/dL    Hematocrit 21.8 (L) 36.0 - 46.0 %     80 - 100 fL    MCH 32.1 26.0 - 34.0 pg    MCHC 32.1 32.0 - 36.0 g/dL    RDW 21.8 (H) 11.5 - 14.5 %    Platelets 322 150 - 450 x10*3/uL    Neutrophils % 83.7 40.0 - 80.0 %    Immature Granulocytes %, Automated 0.7 0.0 - 0.9 %    Lymphocytes % 8.0 13.0 - 44.0 %    Monocytes % 7.2 2.0 - 10.0 %    Eosinophils % 0.1 0.0 - 6.0 %    Basophils % 0.3 0.0 - 2.0 %    Neutrophils Absolute 11.44 (H) 1.60 - 5.50 x10*3/uL    Immature Granulocytes Absolute, Automated 0.10 0.00 - 0.50 x10*3/uL    Lymphocytes Absolute 1.09 0.80 - 3.00 x10*3/uL    Monocytes Absolute 0.99 (H) 0.05 - 0.80 x10*3/uL    Eosinophils Absolute 0.02 0.00 - 0.40 x10*3/uL    Basophils Absolute 0.04 0.00 - 0.10 x10*3/uL   Magnesium    Collection Time: 04/22/24  4:01 PM   Result Value Ref Range    Magnesium 1.81 1.60 - 2.40 mg/dL   Comprehensive metabolic panel    Collection Time: 04/22/24  4:01 PM   Result Value Ref Range    Glucose 161 (H) 74 - 99 mg/dL    Sodium 137 136 - 145 mmol/L    Potassium 5.1 3.5 - 5.3 mmol/L    Chloride 103 98 - 107 mmol/L    Bicarbonate 23 21 - 32 mmol/L    Anion Gap 16 10 - 20 mmol/L    Urea Nitrogen 47 (H) 6 - 23 mg/dL    Creatinine 3.03 (H) 0.50 -  1.05 mg/dL    eGFR 15 (L) >60 mL/min/1.73m*2    Calcium 10.0 8.6 - 10.3 mg/dL    Albumin 3.9 3.4 - 5.0 g/dL    Alkaline Phosphatase 152 (H) 33 - 136 U/L    Total Protein 6.5 6.4 - 8.2 g/dL    AST 20 9 - 39 U/L    Bilirubin, Total 0.6 0.0 - 1.2 mg/dL    ALT 26 7 - 45 U/L   Lipase    Collection Time: 04/22/24  4:01 PM   Result Value Ref Range    Lipase 28 9 - 82 U/L   Troponin I, High Sensitivity    Collection Time: 04/22/24  4:01 PM   Result Value Ref Range    Troponin I, High Sensitivity 8 0 - 13 ng/L   B-Type Natriuretic Peptide    Collection Time: 04/22/24  4:01 PM   Result Value Ref Range     (H) 0 - 99 pg/mL   Morphology    Collection Time: 04/22/24  4:01 PM   Result Value Ref Range    RBC Morphology See Below     Polychromasia Mild     Hypochromia Mild     Target Cells Few     Ovalocytes Few     Teardrop Cells Few     Stomatocytes Few    ECG 12 lead    Collection Time: 04/22/24  4:25 PM   Result Value Ref Range    Ventricular Rate 70 BPM    Atrial Rate 71 BPM    PA Interval 154 ms    QRS Duration 130 ms    QT Interval 450 ms    QTC Calculation(Bazett) 486 ms    P Axis 38 degrees    R Axis 5 degrees    T Axis 69 degrees    QRS Count 11 beats    Q Onset 249 ms    T Offset 474 ms    QTC Fredericia 474 ms   Comprehensive metabolic panel    Collection Time: 04/23/24  3:33 AM   Result Value Ref Range    Glucose 129 (H) 74 - 99 mg/dL    Sodium 138 136 - 145 mmol/L    Potassium 4.5 3.5 - 5.3 mmol/L    Chloride 105 98 - 107 mmol/L    Bicarbonate 27 21 - 32 mmol/L    Anion Gap 11 10 - 20 mmol/L    Urea Nitrogen 53 (H) 6 - 23 mg/dL    Creatinine 3.39 (H) 0.50 - 1.05 mg/dL    eGFR 13 (L) >60 mL/min/1.73m*2    Calcium 9.0 8.6 - 10.3 mg/dL    Albumin 3.3 (L) 3.4 - 5.0 g/dL    Alkaline Phosphatase 110 33 - 136 U/L    Total Protein 5.2 (L) 6.4 - 8.2 g/dL    AST 14 9 - 39 U/L    Bilirubin, Total 0.4 0.0 - 1.2 mg/dL    ALT 19 7 - 45 U/L   Hepatitis B surface antibody    Collection Time: 04/23/24  3:33 AM   Result Value  Ref Range    Hepatitis B Surface AB 20.0 (H) <10.0 mIU/mL   CBC    Collection Time: 04/23/24  4:44 AM   Result Value Ref Range    WBC 5.9 4.4 - 11.3 x10*3/uL    nRBC 0.7 (H) 0.0 - 0.0 /100 WBCs    RBC 1.54 (L) 4.00 - 5.20 x10*6/uL    Hemoglobin 4.8 (LL) 12.0 - 16.0 g/dL    Hematocrit 15.3 (L) 36.0 - 46.0 %    MCV 99 80 - 100 fL    MCH 31.2 26.0 - 34.0 pg    MCHC 31.4 (L) 32.0 - 36.0 g/dL    RDW 22.1 (H) 11.5 - 14.5 %    Platelets 235 150 - 450 x10*3/uL   Prepare RBC: 1 Units    Collection Time: 04/23/24  6:21 AM   Result Value Ref Range    PRODUCT CODE [id number]     Unit Number [id number]     Unit ABO A     Unit RH NEG     XM INTEP COMP     Dispense Status TR     Blood Expiration Date May 09, 2024 23:59 EDT     PRODUCT BLOOD TYPE 0600     UNIT VOLUME 350    Type and screen    Collection Time: 04/23/24  6:30 AM   Result Value Ref Range    ABO TYPE A     Rh TYPE POS     ANTIBODY SCREEN NEG    POCT GLUCOSE    Collection Time: 04/23/24  6:52 AM   Result Value Ref Range    POCT Glucose 125 (H) 74 - 99 mg/dL   POCT GLUCOSE    Collection Time: 04/23/24 12:07 PM   Result Value Ref Range    POCT Glucose 128 (H) 74 - 99 mg/dL   Prepare RBC: 1 Units    Collection Time: 04/23/24  3:07 PM   Result Value Ref Range    PRODUCT CODE [id number]     Unit Number [id number]     Unit ABO A     Unit RH NEG     XM INTEP COMP     Dispense Status TR     Blood Expiration Date May 16, 2024 23:59 EDT     PRODUCT BLOOD TYPE 0600     UNIT VOLUME 350    POCT GLUCOSE    Collection Time: 04/23/24  4:46 PM   Result Value Ref Range    POCT Glucose 135 (H) 74 - 99 mg/dL   Hemoglobin and hematocrit, blood    Collection Time: 04/23/24  9:51 PM   Result Value Ref Range    Hemoglobin 7.8 (L) 12.0 - 16.0 g/dL    Hematocrit 22.7 (L) 36.0 - 46.0 %   POCT GLUCOSE    Collection Time: 04/23/24  9:56 PM   Result Value Ref Range    POCT Glucose 103 (H) 74 - 99 mg/dL   CBC    Collection Time: 04/24/24  4:54 AM   Result Value Ref Range    WBC 6.8 4.4 - 11.3  x10*3/uL    nRBC 0.4 (H) 0.0 - 0.0 /100 WBCs    RBC 2.52 (L) 4.00 - 5.20 x10*6/uL    Hemoglobin 7.7 (L) 12.0 - 16.0 g/dL    Hematocrit 22.0 (L) 36.0 - 46.0 %    MCV 87 80 - 100 fL    MCH 30.6 26.0 - 34.0 pg    MCHC 35.0 32.0 - 36.0 g/dL    RDW 25.2 (H) 11.5 - 14.5 %    Platelets 209 150 - 450 x10*3/uL   Comprehensive metabolic panel    Collection Time: 04/24/24  4:54 AM   Result Value Ref Range    Glucose 108 (H) 74 - 99 mg/dL    Sodium 135 (L) 136 - 145 mmol/L    Potassium 4.0 3.5 - 5.3 mmol/L    Chloride 102 98 - 107 mmol/L    Bicarbonate 26 21 - 32 mmol/L    Anion Gap 11 10 - 20 mmol/L    Urea Nitrogen 22 6 - 23 mg/dL    Creatinine 2.16 (H) 0.50 - 1.05 mg/dL    eGFR 23 (L) >60 mL/min/1.73m*2    Calcium 8.9 8.6 - 10.3 mg/dL    Albumin 3.3 (L) 3.4 - 5.0 g/dL    Alkaline Phosphatase 104 33 - 136 U/L    Total Protein 5.4 (L) 6.4 - 8.2 g/dL    AST 14 9 - 39 U/L    Bilirubin, Total 0.6 0.0 - 1.2 mg/dL    ALT 16 7 - 45 U/L   POCT GLUCOSE    Collection Time: 04/24/24  6:11 AM   Result Value Ref Range    POCT Glucose 115 (H) 74 - 99 mg/dL   POCT GLUCOSE    Collection Time: 04/24/24 11:38 AM   Result Value Ref Range    POCT Glucose 211 (H) 74 - 99 mg/dL   POCT GLUCOSE    Collection Time: 04/24/24  5:34 PM   Result Value Ref Range    POCT Glucose 93 74 - 99 mg/dL   POCT GLUCOSE    Collection Time: 04/24/24 10:17 PM   Result Value Ref Range    POCT Glucose 195 (H) 74 - 99 mg/dL   CBC    Collection Time: 04/25/24  3:52 AM   Result Value Ref Range    WBC 6.9 4.4 - 11.3 x10*3/uL    nRBC 0.0 0.0 - 0.0 /100 WBCs    RBC 2.50 (L) 4.00 - 5.20 x10*6/uL    Hemoglobin 7.3 (L) 12.0 - 16.0 g/dL    Hematocrit 23.0 (L) 36.0 - 46.0 %    MCV 92 80 - 100 fL    MCH 29.2 26.0 - 34.0 pg    MCHC 31.7 (L) 32.0 - 36.0 g/dL    RDW 24.2 (H) 11.5 - 14.5 %    Platelets 201 150 - 450 x10*3/uL   Comprehensive metabolic panel    Collection Time: 04/25/24  3:52 AM   Result Value Ref Range    Glucose 116 (H) 74 - 99 mg/dL    Sodium 137 136 - 145 mmol/L     Potassium 3.6 3.5 - 5.3 mmol/L    Chloride 102 98 - 107 mmol/L    Bicarbonate 27 21 - 32 mmol/L    Anion Gap 12 10 - 20 mmol/L    Urea Nitrogen 14 6 - 23 mg/dL    Creatinine 1.67 (H) 0.50 - 1.05 mg/dL    eGFR 31 (L) >60 mL/min/1.73m*2    Calcium 8.7 8.6 - 10.3 mg/dL    Albumin 3.3 (L) 3.4 - 5.0 g/dL    Alkaline Phosphatase 103 33 - 136 U/L    Total Protein 5.5 (L) 6.4 - 8.2 g/dL    AST 15 9 - 39 U/L    Bilirubin, Total 0.6 0.0 - 1.2 mg/dL    ALT 14 7 - 45 U/L   POCT GLUCOSE    Collection Time: 04/25/24  6:27 AM   Result Value Ref Range    POCT Glucose 123 (H) 74 - 99 mg/dL   POCT GLUCOSE    Collection Time: 04/25/24 12:31 PM   Result Value Ref Range    POCT Glucose 124 (H) 74 - 99 mg/dL   POCT GLUCOSE    Collection Time: 04/25/24  4:10 PM   Result Value Ref Range    POCT Glucose 117 (H) 74 - 99 mg/dL   POCT GLUCOSE    Collection Time: 04/25/24  8:34 PM   Result Value Ref Range    POCT Glucose 124 (H) 74 - 99 mg/dL   CBC and Auto Differential    Collection Time: 04/26/24  3:43 AM   Result Value Ref Range    WBC 5.0 4.4 - 11.3 x10*3/uL    nRBC 0.6 (H) 0.0 - 0.0 /100 WBCs    RBC 2.40 (L) 4.00 - 5.20 x10*6/uL    Hemoglobin 7.1 (L) 12.0 - 16.0 g/dL    Hematocrit 22.2 (L) 36.0 - 46.0 %    MCV 93 80 - 100 fL    MCH 29.6 26.0 - 34.0 pg    MCHC 32.0 32.0 - 36.0 g/dL    RDW 23.8 (H) 11.5 - 14.5 %    Platelets 193 150 - 450 x10*3/uL    Neutrophils % 55.7 40.0 - 80.0 %    Immature Granulocytes %, Automated 0.6 0.0 - 0.9 %    Lymphocytes % 30.0 13.0 - 44.0 %    Monocytes % 8.7 2.0 - 10.0 %    Eosinophils % 4.8 0.0 - 6.0 %    Basophils % 0.2 0.0 - 2.0 %    Neutrophils Absolute 2.80 1.60 - 5.50 x10*3/uL    Immature Granulocytes Absolute, Automated 0.03 0.00 - 0.50 x10*3/uL    Lymphocytes Absolute 1.51 0.80 - 3.00 x10*3/uL    Monocytes Absolute 0.44 0.05 - 0.80 x10*3/uL    Eosinophils Absolute 0.24 0.00 - 0.40 x10*3/uL    Basophils Absolute 0.01 0.00 - 0.10 x10*3/uL   Basic Metabolic Panel    Collection Time: 04/26/24  3:43 AM    Result Value Ref Range    Glucose 96 74 - 99 mg/dL    Sodium 137 136 - 145 mmol/L    Potassium 3.6 3.5 - 5.3 mmol/L    Chloride 104 98 - 107 mmol/L    Bicarbonate 26 21 - 32 mmol/L    Anion Gap 11 10 - 20 mmol/L    Urea Nitrogen 18 6 - 23 mg/dL    Creatinine 1.93 (H) 0.50 - 1.05 mg/dL    eGFR 26 (L) >60 mL/min/1.73m*2    Calcium 9.0 8.6 - 10.3 mg/dL   Magnesium    Collection Time: 04/26/24  3:43 AM   Result Value Ref Range    Magnesium 1.79 1.60 - 2.40 mg/dL   Phosphorus    Collection Time: 04/26/24  3:43 AM   Result Value Ref Range    Phosphorus 4.3 2.5 - 4.9 mg/dL   Hepatic Function Panel    Collection Time: 04/26/24  3:43 AM   Result Value Ref Range    Albumin 3.2 (L) 3.4 - 5.0 g/dL    Bilirubin, Total 0.6 0.0 - 1.2 mg/dL    Bilirubin, Direct 0.2 0.0 - 0.3 mg/dL    Alkaline Phosphatase 95 33 - 136 U/L    ALT 12 7 - 45 U/L    AST 14 9 - 39 U/L    Total Protein 5.4 (L) 6.4 - 8.2 g/dL   Amylase    Collection Time: 04/26/24  3:43 AM   Result Value Ref Range    Amylase 34 29 - 103 U/L   Lipase    Collection Time: 04/26/24  3:43 AM   Result Value Ref Range    Lipase 31 9 - 82 U/L   APTT    Collection Time: 04/26/24  3:43 AM   Result Value Ref Range    aPTT 29 27 - 38 seconds   Protime-INR    Collection Time: 04/26/24  3:43 AM   Result Value Ref Range    Protime 12.6 9.8 - 12.8 seconds    INR 1.1 0.9 - 1.1   Type and Screen    Collection Time: 04/26/24  3:43 AM   Result Value Ref Range    ABO TYPE A     Rh TYPE POS     ANTIBODY SCREEN NEG    Morphology    Collection Time: 04/26/24  3:43 AM   Result Value Ref Range    RBC Morphology See Below     Polychromasia Mild     Hypochromia Mild     Ovalocytes Few     Teardrop Cells Few    POCT GLUCOSE    Collection Time: 04/26/24  6:39 AM   Result Value Ref Range    POCT Glucose 105 (H) 74 - 99 mg/dL   POCT GLUCOSE    Collection Time: 04/26/24 12:20 PM   Result Value Ref Range    POCT Glucose 103 (H) 74 - 99 mg/dL   POCT GLUCOSE    Collection Time: 04/26/24  3:20 PM   Result  Value Ref Range    POCT Glucose 186 (H) 74 - 99 mg/dL   POCT GLUCOSE    Collection Time: 04/26/24  4:20 PM   Result Value Ref Range    POCT Glucose 173 (H) 74 - 99 mg/dL   POCT GLUCOSE    Collection Time: 04/26/24  8:32 PM   Result Value Ref Range    POCT Glucose 178 (H) 74 - 99 mg/dL   CBC and Auto Differential    Collection Time: 04/27/24  4:06 AM   Result Value Ref Range    WBC 5.2 4.4 - 11.3 x10*3/uL    nRBC 0.0 0.0 - 0.0 /100 WBCs    RBC 2.16 (L) 4.00 - 5.20 x10*6/uL    Hemoglobin 6.4 (LL) 12.0 - 16.0 g/dL    Hematocrit 20.6 (L) 36.0 - 46.0 %    MCV 95 80 - 100 fL    MCH 29.6 26.0 - 34.0 pg    MCHC 31.1 (L) 32.0 - 36.0 g/dL    RDW 23.5 (H) 11.5 - 14.5 %    Platelets 203 150 - 450 x10*3/uL    Neutrophils % 77.6 40.0 - 80.0 %    Immature Granulocytes %, Automated 0.4 0.0 - 0.9 %    Lymphocytes % 14.3 13.0 - 44.0 %    Monocytes % 7.3 2.0 - 10.0 %    Eosinophils % 0.0 0.0 - 6.0 %    Basophils % 0.4 0.0 - 2.0 %    Neutrophils Absolute 4.03 1.60 - 5.50 x10*3/uL    Immature Granulocytes Absolute, Automated 0.02 0.00 - 0.50 x10*3/uL    Lymphocytes Absolute 0.74 (L) 0.80 - 3.00 x10*3/uL    Monocytes Absolute 0.38 0.05 - 0.80 x10*3/uL    Eosinophils Absolute 0.00 0.00 - 0.40 x10*3/uL    Basophils Absolute 0.02 0.00 - 0.10 x10*3/uL   Basic Metabolic Panel    Collection Time: 04/27/24  4:06 AM   Result Value Ref Range    Glucose 146 (H) 74 - 99 mg/dL    Sodium 135 (L) 136 - 145 mmol/L    Potassium 4.4 3.5 - 5.3 mmol/L    Chloride 102 98 - 107 mmol/L    Bicarbonate 27 21 - 32 mmol/L    Anion Gap 10 10 - 20 mmol/L    Urea Nitrogen 16 6 - 23 mg/dL    Creatinine 1.97 (H) 0.50 - 1.05 mg/dL    eGFR 25 (L) >60 mL/min/1.73m*2    Calcium 8.3 (L) 8.6 - 10.3 mg/dL   Magnesium    Collection Time: 04/27/24  4:06 AM   Result Value Ref Range    Magnesium 1.72 1.60 - 2.40 mg/dL   Phosphorus    Collection Time: 04/27/24  4:06 AM   Result Value Ref Range    Phosphorus 4.3 2.5 - 4.9 mg/dL   Hepatic function panel    Collection Time: 04/27/24   4:06 AM   Result Value Ref Range    Albumin 3.2 (L) 3.4 - 5.0 g/dL    Bilirubin, Total 0.5 0.0 - 1.2 mg/dL    Bilirubin, Direct 0.1 0.0 - 0.3 mg/dL    Alkaline Phosphatase 85 33 - 136 U/L    ALT 14 7 - 45 U/L    AST 16 9 - 39 U/L    Total Protein 5.3 (L) 6.4 - 8.2 g/dL   Protime-INR    Collection Time: 04/27/24  4:06 AM   Result Value Ref Range    Protime 12.9 (H) 9.8 - 12.8 seconds    INR 1.1 0.9 - 1.1   APTT    Collection Time: 04/27/24  4:06 AM   Result Value Ref Range    aPTT 28 27 - 38 seconds   Morphology    Collection Time: 04/27/24  4:06 AM   Result Value Ref Range    RBC Morphology See Below     Polychromasia Mild     Hypochromia Mild     Target Cells Few     Teardrop Cells Few     Basophilic Stippling Present    Prepare RBC: 1 Units    Collection Time: 04/27/24  5:04 AM   Result Value Ref Range    PRODUCT CODE [id number]     Unit Number [id number]     Unit ABO A     Unit RH NEG     XM INTEP COMP     Dispense Status PT     Blood Expiration Date May 09, 2024 23:59 EDT     PRODUCT BLOOD TYPE 0600     UNIT VOLUME 283    POCT GLUCOSE    Collection Time: 04/27/24  6:16 AM   Result Value Ref Range    POCT Glucose 110 (H) 74 - 99 mg/dL   POCT GLUCOSE    Collection Time: 04/27/24 11:57 AM   Result Value Ref Range    POCT Glucose 119 (H) 74 - 99 mg/dL   Sterile Fluid Culture/Smear    Collection Time: 04/27/24  2:31 PM    Specimen: Aspirate; Fluid   Result Value Ref Range    Sterile Fluid Culture/Smear (4+) Abundant Klebsiella pneumoniae/variicola (A)     Gram Stain (3+) Moderate Polymorphonuclear leukocytes (A)     Gram Stain (3+) Moderate Gram negative bacilli (A)        Susceptibility    Klebsiella pneumoniae/variicola - MICROSCAN     Amoxicillin/Clavulanate  Susceptible      Ampicillin  Resistant      Ampicillin/Sulbactam  Susceptible      Cefazolin  Susceptible      Ciprofloxacin  Intermediate      Gentamicin  Susceptible      Levofloxacin  Susceptible      Piperacillin/Tazobactam  Susceptible       Trimethoprim/Sulfamethoxazole  Susceptible    POCT GLUCOSE    Collection Time: 04/27/24  4:15 PM   Result Value Ref Range    POCT Glucose 101 (H) 74 - 99 mg/dL   POCT GLUCOSE    Collection Time: 04/27/24  9:01 PM   Result Value Ref Range    POCT Glucose 148 (H) 74 - 99 mg/dL   CBC and Auto Differential    Collection Time: 04/28/24  5:55 AM   Result Value Ref Range    WBC 4.8 4.4 - 11.3 x10*3/uL    nRBC 0.0 0.0 - 0.0 /100 WBCs    RBC 2.46 (L) 4.00 - 5.20 x10*6/uL    Hemoglobin 7.5 (L) 12.0 - 16.0 g/dL    Hematocrit 22.7 (L) 36.0 - 46.0 %    MCV 92 80 - 100 fL    MCH 30.5 26.0 - 34.0 pg    MCHC 33.0 32.0 - 36.0 g/dL    RDW 23.0 (H) 11.5 - 14.5 %    Platelets 172 150 - 450 x10*3/uL    Neutrophils % 58.4 40.0 - 80.0 %    Immature Granulocytes %, Automated 0.4 0.0 - 0.9 %    Lymphocytes % 30.8 13.0 - 44.0 %    Monocytes % 7.7 2.0 - 10.0 %    Eosinophils % 2.3 0.0 - 6.0 %    Basophils % 0.4 0.0 - 2.0 %    Neutrophils Absolute 2.81 1.60 - 5.50 x10*3/uL    Immature Granulocytes Absolute, Automated 0.02 0.00 - 0.50 x10*3/uL    Lymphocytes Absolute 1.48 0.80 - 3.00 x10*3/uL    Monocytes Absolute 0.37 0.05 - 0.80 x10*3/uL    Eosinophils Absolute 0.11 0.00 - 0.40 x10*3/uL    Basophils Absolute 0.02 0.00 - 0.10 x10*3/uL   Basic Metabolic Panel    Collection Time: 04/28/24  5:55 AM   Result Value Ref Range    Glucose 107 (H) 74 - 99 mg/dL    Sodium 136 136 - 145 mmol/L    Potassium 4.1 3.5 - 5.3 mmol/L    Chloride 103 98 - 107 mmol/L    Bicarbonate 26 21 - 32 mmol/L    Anion Gap 11 10 - 20 mmol/L    Urea Nitrogen 24 (H) 6 - 23 mg/dL    Creatinine 2.64 (H) 0.50 - 1.05 mg/dL    eGFR 18 (L) >60 mL/min/1.73m*2    Calcium 8.7 8.6 - 10.3 mg/dL   Magnesium    Collection Time: 04/28/24  5:55 AM   Result Value Ref Range    Magnesium 1.68 1.60 - 2.40 mg/dL   Morphology    Collection Time: 04/28/24  5:55 AM   Result Value Ref Range    RBC Morphology See Below     Polychromasia Mild     Hypochromia Mild     Target Cells Few     Ovalocytes  Few     Teardrop Cells Few     Basophilic Stippling Present    POCT GLUCOSE    Collection Time: 04/28/24  6:33 AM   Result Value Ref Range    POCT Glucose 114 (H) 74 - 99 mg/dL   POCT GLUCOSE    Collection Time: 04/28/24 11:10 AM   Result Value Ref Range    POCT Glucose 132 (H) 74 - 99 mg/dL   POCT GLUCOSE    Collection Time: 04/28/24  4:38 PM   Result Value Ref Range    POCT Glucose 149 (H) 74 - 99 mg/dL   POCT GLUCOSE    Collection Time: 04/28/24  8:48 PM   Result Value Ref Range    POCT Glucose 186 (H) 74 - 99 mg/dL   CBC and Auto Differential    Collection Time: 04/29/24  3:27 AM   Result Value Ref Range    WBC 4.4 4.4 - 11.3 x10*3/uL    nRBC 0.0 0.0 - 0.0 /100 WBCs    RBC 2.48 (L) 4.00 - 5.20 x10*6/uL    Hemoglobin 7.5 (L) 12.0 - 16.0 g/dL    Hematocrit 23.1 (L) 36.0 - 46.0 %    MCV 93 80 - 100 fL    MCH 30.2 26.0 - 34.0 pg    MCHC 32.5 32.0 - 36.0 g/dL    RDW 22.4 (H) 11.5 - 14.5 %    Platelets 155 150 - 450 x10*3/uL    Neutrophils % 56.5 40.0 - 80.0 %    Immature Granulocytes %, Automated 0.5 0.0 - 0.9 %    Lymphocytes % 28.7 13.0 - 44.0 %    Monocytes % 10.6 2.0 - 10.0 %    Eosinophils % 3.2 0.0 - 6.0 %    Basophils % 0.5 0.0 - 2.0 %    Neutrophils Absolute 2.46 1.60 - 5.50 x10*3/uL    Immature Granulocytes Absolute, Automated 0.02 0.00 - 0.50 x10*3/uL    Lymphocytes Absolute 1.25 0.80 - 3.00 x10*3/uL    Monocytes Absolute 0.46 0.05 - 0.80 x10*3/uL    Eosinophils Absolute 0.14 0.00 - 0.40 x10*3/uL    Basophils Absolute 0.02 0.00 - 0.10 x10*3/uL   Basic Metabolic Panel    Collection Time: 04/29/24  3:27 AM   Result Value Ref Range    Glucose 157 (H) 74 - 99 mg/dL    Sodium 133 (L) 136 - 145 mmol/L    Potassium 4.1 3.5 - 5.3 mmol/L    Chloride 102 98 - 107 mmol/L    Bicarbonate 25 21 - 32 mmol/L    Anion Gap 10 10 - 20 mmol/L    Urea Nitrogen 31 (H) 6 - 23 mg/dL    Creatinine 2.73 (H) 0.50 - 1.05 mg/dL    eGFR 17 (L) >60 mL/min/1.73m*2    Calcium 8.6 8.6 - 10.3 mg/dL   Magnesium    Collection Time: 04/29/24   3:27 AM   Result Value Ref Range    Magnesium 1.60 1.60 - 2.40 mg/dL   Morphology    Collection Time: 04/29/24  3:27 AM   Result Value Ref Range    RBC Morphology See Below     Ovalocytes Few     Teardrop Cells Few     Basophilic Stippling Present    Iron and TIBC    Collection Time: 04/29/24  3:27 AM   Result Value Ref Range    Iron 39 35 - 150 ug/dL    UIBC 93 (L) 110 - 370 ug/dL    TIBC 132 (L) 240 - 445 ug/dL    % Saturation 30 25 - 45 %   Vitamin B12    Collection Time: 04/29/24  3:27 AM   Result Value Ref Range    Vitamin B12 1,234 (H) 211 - 911 pg/mL   Folate    Collection Time: 04/29/24  3:27 AM   Result Value Ref Range    Folate, Serum 11.4 >5.0 ng/mL   POCT GLUCOSE    Collection Time: 04/29/24  6:38 AM   Result Value Ref Range    POCT Glucose 123 (H) 74 - 99 mg/dL   POCT GLUCOSE    Collection Time: 04/29/24  8:35 AM   Result Value Ref Range    POCT Glucose 121 (H) 74 - 99 mg/dL   POCT GLUCOSE    Collection Time: 04/29/24 11:00 AM   Result Value Ref Range    POCT Glucose 145 (H) 74 - 99 mg/dL   POCT GLUCOSE    Collection Time: 04/29/24  5:23 PM   Result Value Ref Range    POCT Glucose 129 (H) 74 - 99 mg/dL   POCT GLUCOSE    Collection Time: 04/29/24  9:19 PM   Result Value Ref Range    POCT Glucose 144 (H) 74 - 99 mg/dL   POCT GLUCOSE    Collection Time: 04/30/24  6:49 AM   Result Value Ref Range    POCT Glucose 113 (H) 74 - 99 mg/dL   POCT GLUCOSE    Collection Time: 04/30/24 11:04 AM   Result Value Ref Range    POCT Glucose 159 (H) 74 - 99 mg/dL   CBC and Auto Differential    Collection Time: 05/14/24 11:16 AM   Result Value Ref Range    WBC 4.9 4.4 - 11.3 x10*3/uL    nRBC 0.0 0.0 - 0.0 /100 WBCs    RBC 2.70 (L) 4.00 - 5.20 x10*6/uL    Hemoglobin 8.2 (L) 12.0 - 16.0 g/dL    Hematocrit 26.0 (L) 36.0 - 46.0 %    MCV 96 80 - 100 fL    MCH 30.4 26.0 - 34.0 pg    MCHC 31.5 (L) 32.0 - 36.0 g/dL    RDW 22.7 (H) 11.5 - 14.5 %    Platelets 222 150 - 450 x10*3/uL    Neutrophils % 57.3 40.0 - 80.0 %    Immature  Granulocytes %, Automated 0.4 0.0 - 0.9 %    Lymphocytes % 26.5 13.0 - 44.0 %    Monocytes % 10.7 2.0 - 10.0 %    Eosinophils % 4.3 0.0 - 6.0 %    Basophils % 0.8 0.0 - 2.0 %    Neutrophils Absolute 2.78 1.60 - 5.50 x10*3/uL    Immature Granulocytes Absolute, Automated 0.02 0.00 - 0.50 x10*3/uL    Lymphocytes Absolute 1.29 0.80 - 3.00 x10*3/uL    Monocytes Absolute 0.52 0.05 - 0.80 x10*3/uL    Eosinophils Absolute 0.21 0.00 - 0.40 x10*3/uL    Basophils Absolute 0.04 0.00 - 0.10 x10*3/uL   Hepatic function panel    Collection Time: 05/14/24 11:16 AM   Result Value Ref Range    Albumin 4.0 3.4 - 5.0 g/dL    Bilirubin, Total 0.8 0.0 - 1.2 mg/dL    Bilirubin, Direct 0.1 0.0 - 0.3 mg/dL    Alkaline Phosphatase 90 33 - 136 U/L    ALT 12 7 - 45 U/L    AST 14 9 - 39 U/L    Total Protein 6.4 6.4 - 8.2 g/dL   Amylase    Collection Time: 05/14/24 11:16 AM   Result Value Ref Range    Amylase 46 29 - 103 U/L   Morphology    Collection Time: 05/14/24 11:16 AM   Result Value Ref Range    RBC Morphology See Below     Ovalocytes Few    ECG 12 lead    Collection Time: 05/19/24 11:23 AM   Result Value Ref Range    Ventricular Rate 98 BPM    Atrial Rate 96 BPM    DC Interval 164 ms    QRS Duration 88 ms    QT Interval 397 ms    QTC Calculation(Bazett) 507 ms    P Axis 48 degrees    R Axis 11 degrees    T Axis 58 degrees    QRS Count 16 beats    Q Onset 249 ms    T Offset 448 ms    QTC Fredericia 467 ms   CBC and Auto Differential    Collection Time: 05/19/24 11:48 AM   Result Value Ref Range    WBC 9.6 4.4 - 11.3 x10*3/uL    nRBC 0.0 0.0 - 0.0 /100 WBCs    RBC 2.45 (L) 4.00 - 5.20 x10*6/uL    Hemoglobin 7.5 (L) 12.0 - 16.0 g/dL    Hematocrit 22.9 (L) 36.0 - 46.0 %    MCV 94 80 - 100 fL    MCH 30.6 26.0 - 34.0 pg    MCHC 32.8 32.0 - 36.0 g/dL    RDW 22.5 (H) 11.5 - 14.5 %    Platelets 210 150 - 450 x10*3/uL    Immature Granulocytes %, Automated 0.6 0.0 - 0.9 %    Immature Granulocytes Absolute, Automated 0.06 0.00 - 0.50 x10*3/uL    Comprehensive metabolic panel    Collection Time: 05/19/24 11:48 AM   Result Value Ref Range    Glucose 165 (H) 74 - 99 mg/dL    Sodium 137 136 - 145 mmol/L    Potassium 3.9 3.5 - 5.3 mmol/L    Chloride 101 98 - 107 mmol/L    Bicarbonate 27 21 - 32 mmol/L    Anion Gap 13 10 - 20 mmol/L    Urea Nitrogen 33 (H) 6 - 23 mg/dL    Creatinine 2.38 (H) 0.50 - 1.05 mg/dL    eGFR 20 (L) >60 mL/min/1.73m*2    Calcium 10.0 8.6 - 10.3 mg/dL    Albumin 3.9 3.4 - 5.0 g/dL    Alkaline Phosphatase 292 (H) 33 - 136 U/L    Total Protein 6.4 6.4 - 8.2 g/dL     (H) 9 - 39 U/L    Bilirubin, Total 4.7 (H) 0.0 - 1.2 mg/dL     (H) 7 - 45 U/L   Lactate    Collection Time: 05/19/24 11:48 AM   Result Value Ref Range    Lactate 1.8 0.4 - 2.0 mmol/L   Lipase    Collection Time: 05/19/24 11:48 AM   Result Value Ref Range    Lipase 146 (H) 9 - 82 U/L   Cardiac Enzymes - CPK    Collection Time: 05/19/24 11:48 AM   Result Value Ref Range    Creatine Kinase 172 0 - 215 U/L   Troponin I, High Sensitivity, Initial    Collection Time: 05/19/24 11:48 AM   Result Value Ref Range    Troponin I, High Sensitivity 62 (HH) 0 - 13 ng/L   Manual Differential    Collection Time: 05/19/24 11:48 AM   Result Value Ref Range    Neutrophils %, Manual 71.0 40.0 - 80.0 %    Bands %, Manual 20.0 0.0 - 5.0 %    Lymphocytes %, Manual 5.0 13.0 - 44.0 %    Monocytes %, Manual 4.0 2.0 - 10.0 %    Eosinophils %, Manual 0.0 0.0 - 6.0 %    Basophils %, Manual 0.0 0.0 - 2.0 %    Seg Neutrophils Absolute, Manual 6.82 (H) 1.60 - 5.00 x10*3/uL    Bands Absolute, Manual 1.92 (H) 0.00 - 0.50 x10*3/uL    Lymphocytes Absolute, Manual 0.48 (L) 0.80 - 3.00 x10*3/uL    Monocytes Absolute, Manual 0.38 0.05 - 0.80 x10*3/uL    Eosinophils Absolute, Manual 0.00 0.00 - 0.40 x10*3/uL    Basophils Absolute, Manual 0.00 0.00 - 0.10 x10*3/uL    Total Cells Counted 100     Neutrophils Absolute, Manual 8.74 (H) 1.60 - 5.50 x10*3/uL    RBC Morphology See Below     Hypochromia Mild      Target Cells Few     Ovalocytes Few     Teardrop Cells Few    Light Blue Top    Collection Time: 05/19/24 11:49 AM   Result Value Ref Range    Extra Tube Hold for add-ons.    Troponin, High Sensitivity, 1 Hour    Collection Time: 05/19/24 12:50 PM   Result Value Ref Range    Troponin I, High Sensitivity 63 (HH) 0 - 13 ng/L   Troponin I, High Sensitivity    Collection Time: 05/19/24  6:26 PM   Result Value Ref Range    Troponin I, High Sensitivity 92 (HH) 0 - 13 ng/L   POCT GLUCOSE    Collection Time: 05/19/24  9:08 PM   Result Value Ref Range    POCT Glucose 124 (H) 74 - 99 mg/dL   Troponin I, High Sensitivity    Collection Time: 05/19/24  9:10 PM   Result Value Ref Range    Troponin I, High Sensitivity 98 (HH) 0 - 13 ng/L   CBC    Collection Time: 05/20/24  4:34 AM   Result Value Ref Range    WBC 5.7 4.4 - 11.3 x10*3/uL    nRBC 0.0 0.0 - 0.0 /100 WBCs    RBC 2.10 (L) 4.00 - 5.20 x10*6/uL    Hemoglobin 6.4 (LL) 12.0 - 16.0 g/dL    Hematocrit 19.8 (L) 36.0 - 46.0 %    MCV 94 80 - 100 fL    MCH 30.5 26.0 - 34.0 pg    MCHC 32.3 32.0 - 36.0 g/dL    RDW 22.8 (H) 11.5 - 14.5 %    Platelets 185 150 - 450 x10*3/uL   Comprehensive metabolic panel    Collection Time: 05/20/24  4:34 AM   Result Value Ref Range    Glucose 120 (H) 74 - 99 mg/dL    Sodium 137 136 - 145 mmol/L    Potassium 3.9 3.5 - 5.3 mmol/L    Chloride 103 98 - 107 mmol/L    Bicarbonate 27 21 - 32 mmol/L    Anion Gap 11 10 - 20 mmol/L    Urea Nitrogen 35 (H) 6 - 23 mg/dL    Creatinine 2.57 (H) 0.50 - 1.05 mg/dL    eGFR 18 (L) >60 mL/min/1.73m*2    Calcium 9.3 8.6 - 10.3 mg/dL    Albumin 3.2 (L) 3.4 - 5.0 g/dL    Alkaline Phosphatase 270 (H) 33 - 136 U/L    Total Protein 5.6 (L) 6.4 - 8.2 g/dL     (H) 9 - 39 U/L    Bilirubin, Total 2.9 (H) 0.0 - 1.2 mg/dL     (H) 7 - 45 U/L   Lipase    Collection Time: 05/20/24  4:34 AM   Result Value Ref Range    Lipase 32 9 - 82 U/L   Bilirubin, Direct    Collection Time: 05/20/24  4:34 AM   Result Value Ref  Range    Bilirubin, Direct 1.6 (H) 0.0 - 0.3 mg/dL   Prepare RBC: 1 Units    Collection Time: 05/20/24  5:54 AM   Result Value Ref Range    PRODUCT CODE [id number]     Unit Number [id number]     Unit ABO A     Unit RH POS     XM INTEP COMP     Dispense Status TR     Blood Expiration Date June 06, 2024 23:59 EDT     PRODUCT BLOOD TYPE      UNIT VOLUME 266    Type and screen    Collection Time: 05/20/24  6:13 AM   Result Value Ref Range    ABO TYPE A     Rh TYPE POS     ANTIBODY SCREEN NEG    POCT GLUCOSE    Collection Time: 05/20/24  8:11 AM   Result Value Ref Range    POCT Glucose 98 74 - 99 mg/dL   Protime-INR    Collection Time: 05/20/24 10:02 AM   Result Value Ref Range    Protime 17.3 (H) 9.8 - 12.8 seconds    INR 1.5 (H) 0.9 - 1.1   APTT    Collection Time: 05/20/24 10:02 AM   Result Value Ref Range    aPTT 32 27 - 38 seconds   POCT GLUCOSE    Collection Time: 05/20/24 11:16 AM   Result Value Ref Range    POCT Glucose 209 (H) 74 - 99 mg/dL   Hepatitis panel, acute    Collection Time: 05/20/24  2:35 PM   Result Value Ref Range    Hepatitis B Surface AG Nonreactive Nonreactive    Hepatitis A  AB- IgM Nonreactive Nonreactive    Hepatitis B Core AB; IgM Nonreactive Nonreactive    Hepatitis C AB Nonreactive Nonreactive   Blood Culture    Collection Time: 05/20/24  5:31 PM    Specimen: Dialysis; Blood culture   Result Value Ref Range    Blood Culture No growth at 4 days -  FINAL REPORT    Blood Culture    Collection Time: 05/20/24  5:40 PM    Specimen: Peripheral Venipuncture; Blood culture   Result Value Ref Range    Blood Culture No growth at 4 days -  FINAL REPORT    Blood Culture    Collection Time: 05/20/24  5:50 PM    Specimen: Peripheral Venipuncture; Blood culture   Result Value Ref Range    Blood Culture Klebsiella pneumoniae/variicola (AA)     BLOOD CULTURE BOTTLE  Positive Aerobic Bottle     Gram Stain Gram negative bacilli (AA)        Susceptibility    Klebsiella pneumoniae/variicola - MICROSCAN      Amoxicillin/Clavulanate  Susceptible mcg/mL     Ampicillin  Resistant mcg/mL     Ampicillin/Sulbactam  Susceptible mcg/mL     Cefazolin  Susceptible mcg/mL     Ciprofloxacin  Intermediate mcg/mL     Gentamicin  Susceptible mcg/mL     Levofloxacin  Susceptible mcg/mL     Piperacillin/Tazobactam  Susceptible mcg/mL     Trimethoprim/Sulfamethoxazole  Susceptible mcg/mL   POCT GLUCOSE    Collection Time: 05/20/24  6:13 PM   Result Value Ref Range    POCT Glucose 159 (H) 74 - 99 mg/dL   POCT GLUCOSE    Collection Time: 05/21/24  8:58 AM   Result Value Ref Range    POCT Glucose 152 (H) 74 - 99 mg/dL   CBC and Auto Differential    Collection Time: 05/21/24  9:39 AM   Result Value Ref Range    WBC 3.6 (L) 4.4 - 11.3 x10*3/uL    nRBC 0.6 (H) 0.0 - 0.0 /100 WBCs    RBC 2.68 (L) 4.00 - 5.20 x10*6/uL    Hemoglobin 8.1 (L) 12.0 - 16.0 g/dL    Hematocrit 23.9 (L) 36.0 - 46.0 %    MCV 89 80 - 100 fL    MCH 30.2 26.0 - 34.0 pg    MCHC 33.9 32.0 - 36.0 g/dL    RDW 26.7 (H) 11.5 - 14.5 %    Platelets 189 150 - 450 x10*3/uL    Neutrophils % 66.3 40.0 - 80.0 %    Immature Granulocytes %, Automated 0.8 0.0 - 0.9 %    Lymphocytes % 14.9 13.0 - 44.0 %    Monocytes % 11.3 2.0 - 10.0 %    Eosinophils % 6.1 0.0 - 6.0 %    Basophils % 0.6 0.0 - 2.0 %    Neutrophils Absolute 2.41 1.60 - 5.50 x10*3/uL    Immature Granulocytes Absolute, Automated 0.03 0.00 - 0.50 x10*3/uL    Lymphocytes Absolute 0.54 (L) 0.80 - 3.00 x10*3/uL    Monocytes Absolute 0.41 0.05 - 0.80 x10*3/uL    Eosinophils Absolute 0.22 0.00 - 0.40 x10*3/uL    Basophils Absolute 0.02 0.00 - 0.10 x10*3/uL   Magnesium    Collection Time: 05/21/24  9:39 AM   Result Value Ref Range    Magnesium 1.64 1.60 - 2.40 mg/dL   Comprehensive metabolic panel    Collection Time: 05/21/24  9:39 AM   Result Value Ref Range    Glucose 190 (H) 74 - 99 mg/dL    Sodium 134 (L) 136 - 145 mmol/L    Potassium 2.9 (LL) 3.5 - 5.3 mmol/L    Chloride 98 98 - 107 mmol/L    Bicarbonate 29 21 - 32 mmol/L     Anion Gap 10 10 - 20 mmol/L    Urea Nitrogen 15 6 - 23 mg/dL    Creatinine 2.06 (H) 0.50 - 1.05 mg/dL    eGFR 24 (L) >60 mL/min/1.73m*2    Calcium 9.0 8.6 - 10.3 mg/dL    Albumin 3.4 3.4 - 5.0 g/dL    Alkaline Phosphatase 255 (H) 33 - 136 U/L    Total Protein 6.0 (L) 6.4 - 8.2 g/dL    AST 45 (H) 9 - 39 U/L    Bilirubin, Total 1.0 0.0 - 1.2 mg/dL     (H) 7 - 45 U/L   Morphology    Collection Time: 05/21/24  9:39 AM   Result Value Ref Range    RBC Morphology See Below     Polychromasia Mild     Hypochromia Mild     Ovalocytes Few     Basophilic Stippling Present     Giant Platelets Few    POCT GLUCOSE    Collection Time: 05/21/24 12:05 PM   Result Value Ref Range    POCT Glucose 201 (H) 74 - 99 mg/dL   POCT GLUCOSE    Collection Time: 05/21/24  4:13 PM   Result Value Ref Range    POCT Glucose 165 (H) 74 - 99 mg/dL   POCT GLUCOSE    Collection Time: 05/21/24  7:53 PM   Result Value Ref Range    POCT Glucose 210 (H) 74 - 99 mg/dL   Comprehensive Metabolic Panel    Collection Time: 05/22/24  5:02 AM   Result Value Ref Range    Glucose 120 (H) 74 - 99 mg/dL    Sodium 137 136 - 145 mmol/L    Potassium 3.6 3.5 - 5.3 mmol/L    Chloride 103 98 - 107 mmol/L    Bicarbonate 26 21 - 32 mmol/L    Anion Gap 12 10 - 20 mmol/L    Urea Nitrogen 25 (H) 6 - 23 mg/dL    Creatinine 2.32 (H) 0.50 - 1.05 mg/dL    eGFR 21 (L) >60 mL/min/1.73m*2    Calcium 9.1 8.6 - 10.3 mg/dL    Albumin 3.1 (L) 3.4 - 5.0 g/dL    Alkaline Phosphatase 216 (H) 33 - 136 U/L    Total Protein 5.6 (L) 6.4 - 8.2 g/dL    AST 26 9 - 39 U/L    Bilirubin, Total 0.7 0.0 - 1.2 mg/dL    ALT 75 (H) 7 - 45 U/L   CBC    Collection Time: 05/22/24  5:02 AM   Result Value Ref Range    WBC 4.4 4.4 - 11.3 x10*3/uL    nRBC 0.0 0.0 - 0.0 /100 WBCs    RBC 2.52 (L) 4.00 - 5.20 x10*6/uL    Hemoglobin 7.4 (L) 12.0 - 16.0 g/dL    Hematocrit 22.1 (L) 36.0 - 46.0 %    MCV 88 80 - 100 fL    MCH 29.4 26.0 - 34.0 pg    MCHC 33.5 32.0 - 36.0 g/dL    RDW 26.3 (H) 11.5 - 14.5 %     Platelets 195 150 - 450 x10*3/uL   Magnesium    Collection Time: 05/22/24  5:02 AM   Result Value Ref Range    Magnesium 1.72 1.60 - 2.40 mg/dL   POCT GLUCOSE    Collection Time: 05/22/24  7:44 AM   Result Value Ref Range    POCT Glucose 138 (H) 74 - 99 mg/dL   Blood Culture    Collection Time: 05/22/24 11:21 AM    Specimen: Peripheral Venipuncture; Blood culture   Result Value Ref Range    Blood Culture No growth at 4 days -  FINAL REPORT    Blood Culture    Collection Time: 05/22/24 11:21 AM    Specimen: Peripheral Venipuncture; Blood culture   Result Value Ref Range    Blood Culture No growth at 4 days -  FINAL REPORT    POCT GLUCOSE    Collection Time: 05/22/24  3:05 PM   Result Value Ref Range    POCT Glucose 230 (H) 74 - 99 mg/dL   Urine culture    Collection Time: 05/22/24  3:08 PM    Specimen: Clean Catch/Voided; Urine   Result Value Ref Range    Urine Culture No significant growth    POCT GLUCOSE    Collection Time: 05/22/24  7:36 PM   Result Value Ref Range    POCT Glucose 160 (H) 74 - 99 mg/dL   CBC and Auto Differential    Collection Time: 05/23/24  4:30 AM   Result Value Ref Range    WBC 3.9 (L) 4.4 - 11.3 x10*3/uL    nRBC 0.5 (H) 0.0 - 0.0 /100 WBCs    RBC 2.68 (L) 4.00 - 5.20 x10*6/uL    Hemoglobin 7.7 (L) 12.0 - 16.0 g/dL    Hematocrit 24.2 (L) 36.0 - 46.0 %    MCV 90 80 - 100 fL    MCH 28.7 26.0 - 34.0 pg    MCHC 31.8 (L) 32.0 - 36.0 g/dL    RDW 25.7 (H) 11.5 - 14.5 %    Platelets 196 150 - 450 x10*3/uL    Neutrophils % 46.6 40.0 - 80.0 %    Immature Granulocytes %, Automated 0.8 0.0 - 0.9 %    Lymphocytes % 34.2 13.0 - 44.0 %    Monocytes % 9.6 2.0 - 10.0 %    Eosinophils % 8.3 0.0 - 6.0 %    Basophils % 0.5 0.0 - 2.0 %    Neutrophils Absolute 1.80 1.60 - 5.50 x10*3/uL    Immature Granulocytes Absolute, Automated 0.03 0.00 - 0.50 x10*3/uL    Lymphocytes Absolute 1.32 0.80 - 3.00 x10*3/uL    Monocytes Absolute 0.37 0.05 - 0.80 x10*3/uL    Eosinophils Absolute 0.32 0.00 - 0.40 x10*3/uL    Basophils  Absolute 0.02 0.00 - 0.10 x10*3/uL   Comprehensive Metabolic Panel    Collection Time: 05/23/24  4:30 AM   Result Value Ref Range    Glucose 109 (H) 74 - 99 mg/dL    Sodium 138 136 - 145 mmol/L    Potassium 3.6 3.5 - 5.3 mmol/L    Chloride 103 98 - 107 mmol/L    Bicarbonate 29 21 - 32 mmol/L    Anion Gap 10 10 - 20 mmol/L    Urea Nitrogen 13 6 - 23 mg/dL    Creatinine 1.77 (H) 0.50 - 1.05 mg/dL    eGFR 29 (L) >60 mL/min/1.73m*2    Calcium 8.9 8.6 - 10.3 mg/dL    Albumin 3.1 (L) 3.4 - 5.0 g/dL    Alkaline Phosphatase 197 (H) 33 - 136 U/L    Total Protein 5.5 (L) 6.4 - 8.2 g/dL    AST 21 9 - 39 U/L    Bilirubin, Total 0.6 0.0 - 1.2 mg/dL    ALT 52 (H) 7 - 45 U/L   Magnesium    Collection Time: 05/23/24  4:30 AM   Result Value Ref Range    Magnesium 1.66 1.60 - 2.40 mg/dL   Morphology    Collection Time: 05/23/24  4:30 AM   Result Value Ref Range    RBC Morphology See Below     Hypochromia Mild     Ovalocytes Few     Teardrop Cells Few    POCT GLUCOSE    Collection Time: 05/23/24  7:17 AM   Result Value Ref Range    POCT Glucose 162 (H) 74 - 99 mg/dL   POCT GLUCOSE    Collection Time: 05/23/24 11:11 AM   Result Value Ref Range    POCT Glucose 210 (H) 74 - 99 mg/dL   POCT GLUCOSE    Collection Time: 05/23/24  3:06 PM   Result Value Ref Range    POCT Glucose 191 (H) 74 - 99 mg/dL   POCT GLUCOSE    Collection Time: 05/23/24  5:07 PM   Result Value Ref Range    POCT Glucose 133 (H) 74 - 99 mg/dL   Blood Gas Venous Full Panel    Collection Time: 05/23/24  5:16 PM   Result Value Ref Range    POCT pH, Venous 7.45 (H) 7.33 - 7.43 pH    POCT pCO2, Venous 40 (L) 41 - 51 mm Hg    POCT pO2, Venous 51 (H) 35 - 45 mm Hg    POCT SO2, Venous 87 (H) 45 - 75 %    POCT Oxy Hemoglobin, Venous 84.8 (H) 45.0 - 75.0 %    POCT Hematocrit Calculated, Venous 24.0 (L) 36.0 - 46.0 %    POCT Sodium, Venous 133 (L) 136 - 145 mmol/L    POCT Potassium, Venous 4.0 3.5 - 5.3 mmol/L    POCT Chloride, Venous 103 98 - 107 mmol/L    POCT Ionized Calicum,  Venous 1.28 1.10 - 1.33 mmol/L    POCT Glucose, Venous 121 (H) 74 - 99 mg/dL    POCT Lactate, Venous 1.3 0.4 - 2.0 mmol/L    POCT Base Excess, Venous 3.5 (H) -2.0 - 3.0 mmol/L    POCT HCO3 Calculated, Venous 27.8 (H) 22.0 - 26.0 mmol/L    POCT Hemoglobin, Venous 7.9 (L) 12.0 - 16.0 g/dL    POCT Anion Gap, Venous 6.0 (L) 10.0 - 25.0 mmol/L    Patient Temperature 37.0 degrees Celsius    FiO2 0 %   POCT GLUCOSE    Collection Time: 05/23/24  7:18 PM   Result Value Ref Range    POCT Glucose 161 (H) 74 - 99 mg/dL   Urinalysis with Reflex Culture and Microscopic    Collection Time: 05/23/24  9:35 PM   Result Value Ref Range    Color, Urine Light-Yellow Light-Yellow, Yellow, Dark-Yellow    Appearance, Urine Clear Clear    Specific Gravity, Urine 1.006 1.005 - 1.035    pH, Urine 7.0 5.0, 5.5, 6.0, 6.5, 7.0, 7.5, 8.0    Protein, Urine 70 (1+) (A) NEGATIVE, 10 (TRACE), 20 (TRACE) mg/dL    Glucose, Urine Normal Normal mg/dL    Blood, Urine NEGATIVE NEGATIVE    Ketones, Urine NEGATIVE NEGATIVE mg/dL    Bilirubin, Urine NEGATIVE NEGATIVE    Urobilinogen, Urine Normal Normal mg/dL    Nitrite, Urine NEGATIVE NEGATIVE    Leukocyte Esterase, Urine NEGATIVE NEGATIVE   Extra Urine Gray Tube    Collection Time: 05/23/24  9:35 PM   Result Value Ref Range    Extra Tube Hold for add-ons.    Urinalysis Microscopic    Collection Time: 05/23/24  9:35 PM   Result Value Ref Range    WBC, Urine NONE 1-5, NONE /HPF    RBC, Urine NONE NONE, 1-2, 3-5 /HPF    Squamous Epithelial Cells, Urine 1-9 (SPARSE) Reference range not established. /HPF   POCT GLUCOSE    Collection Time: 05/24/24  7:16 AM   Result Value Ref Range    POCT Glucose 128 (H) 74 - 99 mg/dL     *Note: Due to a large number of results and/or encounters for the requested time period, some results have not been displayed. A complete set of results can be found in Results Review.       ECG  Encounter Date: 09/16/24   ECG 12 lead   Result Value    Ventricular Rate 81    Atrial Rate 81     AR Interval 157    QRS Duration 83    QT Interval 422    QTC Calculation(Bazett) 490    P Axis 62    R Axis 6    T Axis 34    QRS Count 13    Q Onset 251    T Offset 462    QTC Fredericia 466    Narrative    Sinus rhythm  Borderline prolonged QT interval    See ED provider note for full interpretation and clinical correlation  Confirmed by Terri Bermudez ([id number]) on 9/20/2024 12:36:39 PM       Echocardiogram  No results found for this or any previous visit from the past 1095 days.      CV Studies:  EKG:  Encounter Date: 09/16/24   ECG 12 lead   Result Value    Ventricular Rate 81    Atrial Rate 81    AR Interval 157    QRS Duration 83    QT Interval 422    QTC Calculation(Bazett) 490    P Axis 62    R Axis 6    T Axis 34    QRS Count 13    Q Onset 251    T Offset 462    QTC Fredericia 466    Narrative    Sinus rhythm  Borderline prolonged QT interval    See ED provider note for full interpretation and clinical correlation  Confirmed by Terri Bermudez ([id number]) on 9/20/2024 12:36:39 PM     Echocardiogram:   Echocardiogram     Narrative  [place] [id number]  Phone [phone] Fax [phone]    TRANSTHORACIC ECHOCARDIOGRAM REPORT      Patient Name:     YVONNE BERT BAEZ      Reading Physician:   [id number] Humphrey Callaway DO  Study Date:       12/14/2022          Referring Physician: JASMIN ROACH  MRN/PID:          [id number]            PCP:  Accession/Order#: [id number]           Department Location: [facility]  YOB: 1945           Fellow:  Gender:           F                   Nurse:  Admit Date:       12/14/2022          Sonographer:         Urvashi Arora RDCS  Admission Status: Inpatient - Routine Additional Staff:  Height:           152.40 cm           CC Report to:  Weight:           75.30 kg            Study Type:          Echocardiogram  BSA:              1.72 m2  Blood Pressure: 159 /63 mmHg    Diagnosis/ICD: R06.00-Dyspnea,  unspecified  Indication:    Dyspnea  Procedure/CPT: Echo Complete w Full [id number]    Patient History:  Pertinent History: Dyspnea.    Study Detail: The following Echo studies were performed: 2D, M-Mode, Doppler and  color flow.      PHYSICIAN INTERPRETATION:  Left Ventricle: Left ventricular systolic function is low normal, with an estimated ejection fraction of 50-55%. There are no regional wall motion abnormalities. The left ventricular cavity size is normal. The left ventricular septal wall thickness is mildly increased. Left ventricular diastolic filling was indeterminate.  Left Atrium: The left atrium is moderate to severely dilated.  Right Ventricle: The right ventricle is normal in size. There is low normal right ventricular systolic function.  Right Atrium: The right atrium is mildly dilated.  Aortic Valve: The aortic valve is trileaflet. There is no evidence of aortic valve regurgitation.  Mitral Valve: The mitral valve is normal in structure. There is mild mitral valve regurgitation.  Tricuspid Valve: The tricuspid valve is structurally normal. There is mild to moderate tricuspid regurgitation. The Doppler estimated RVSP is moderately elevated at 52.4 mmHg.  Pulmonic Valve: The pulmonic valve is structurally normal. There is trace pulmonic valve regurgitation.  Pericardium: There is no pericardial effusion noted.  Aorta: The aortic root is normal.      CONCLUSIONS:  1. Left ventricular systolic function is low normal with a 50-55% estimated ejection fraction.  2. There is low normal right ventricular systolic function.  3. The left atrium is moderate to severely dilated.  4. Mild to moderate tricuspid regurgitation.  5. Moderately elevated right ventricular systolic pressure.    QUANTITATIVE DATA SUMMARY:  2D MEASUREMENTS:  Normal Ranges:  IVSd:          1.29 cm    (0.6-1.1cm)  LVPWd:         1.04 cm    (0.6-1.1cm)  LVIDd:         5.17 cm    (3.9-5.9cm)  LVIDs:         3.93 cm  LV Mass Index: 136.8  g/m2  LV % FS        24.0 %    LA VOLUME:  Normal Ranges:  LA Vol A4C:        81.6 ml    (22+/-6mL/m2)  LA Vol A2C:        81.6 ml  LA Vol BP:         81.6 ml  LA Vol Index A4C:  47.3ml/m2  LA Vol Index A2C:  47.3 ml/m2  LA Vol Index BP:   47.3 ml/m2  LA Area A4C:       24.0 cm2  LA Area A2C:       24.0 cm2  LA Major Axis A4C: 6.0 cm  LA Major Axis A2C: 6.0 cm  LA Volume Index:   47.0 ml/m2  LA Vol A4C:        80.3 ml  LA Vol A2C:        71.0 ml    RA VOLUME BY A/L METHOD:  Normal Ranges:  RA Area A4C: 15.0 cm2    AORTA MEASUREMENTS:  Normal Ranges:  AoV Silva,s: 2.90 cm (1.4-2.6cm)  Asc Ao, d: 3.30 cm (2.1-3.4cm)    LV SYSTOLIC FUNCTION BY 2D PLANIMETRY (MOD):  Normal Ranges:  EF-A4C View: 46.2 % (>=55%)  EF-A2C View: 55.6 %  EF-Biplane:  50.6 %    LV DIASTOLIC FUNCTION:  Normal Ranges:  MV Peak E:    1.05 m/s    (0.7-1.2 m/s)  MV Peak A:    0.91 m/s    (0.42-0.7 m/s)  E/A Ratio:    1.14        (1.0-2.2)  MV e'         0.08 m/s    (>8.0)  MV lateral e' 0.10 m/s  MV medial e'  0.07 m/s  MV A Dur:     108.47 msec  E/e' Ratio:   12.32       (<8.0)  LV IVRT:      74 msec     (<100ms)    MITRAL VALVE:  Normal Ranges:  MV DT: 153 msec (150-240msec)    AORTIC VALVE:  Normal Ranges:  LVOT Max Mario:  1.07 m/s (<=1.1m/s)  LVOT VTI:      25.50 cm  LVOT Diameter: 1.73 cm  (1.8-2.4cm)    RIGHT VENTRICLE:  RV 1   2.8 cm  RV 2   2.3 cm  RV 3   6.4 cm  TAPSE: 26.5 mm  RV s'  0.12 m/s    TRICUSPID VALVE/RVSP:  Normal Ranges:  Peak TR Velocity: 3.51 m/s  RV Syst Pressure: 52.4 mmHg (< 30mmHg)  TV E Vmax:        0.42 m/s  (0.3-0.7m/s)  TV A Vmax:        0.56 m/s  IVC Diam:         1.33 cm  TV e'             0.1 m/s    AORTA:  Asc Ao Diam 3.27 cm      [id number] Humphrey Callaway DO  Electronically signed on 12/14/2022 at 4:35:23 PM         Final     Stress Testing IMESULT([id number]):   NM CARDIAC STRESS REST (MYOCARDIAL PERFUSION MIBI) 04/08/2022    Narrative  MRN: [id number]  Patient Name: YVONNE BAEZ    STUDY:  CARDIAC STRESS/REST  INJECTION; PART 2 STRESS OR REST (NO CHARGE);  CARDIAC STRESS/REST (MYOCARDIAL PERFUSION/MIBI);  4/8/2022 12:00 pm    INDICATION:  CP .    COMPARISON:  None.    ACCESSION NUMBER(S):  [id number]; [id number]; [id number]    ORDERING CLINICIAN:  SHERIE MARINO    TECHNIQUE:  DIVISION OF NUCLEAR MEDICINE  PHARMACOLOGIC STRESS MYOCARDIAL PERFUSION SCAN, ONE DAY PROTOCOL    The patient received an intravenous dose of  11.6 mCi of Tc-99m  tetrofosmin and resting emission tomographic (SPECT) images of the  myocardium were acquired. The patient then received an intravenous  infusion of 0.4mg regadenoson (Lexiscan) followed by an additional  dose of  33.7 mCi of Tc-99m tetrofosmin. Stress phase SPECT images of  the myocardium were then acquired. These included ECG-gated images to  assess and quantify ventricular function.    FINDINGS:  There is a small fixed perfusion defect in the mid  anterior/anteroseptal wall with associated wall motion abnormality  consistent with prior infarct. No reversible perfusion defects seen.    Calculated ejection fraction 50% mild hypokinesis of the mid anterior  wall.    Impression  1. There is a small fixed perfusion defect in the mid  anterior/anteroseptal wall with associated wall motion abnormality  consistent with prior infarct. There is a low probability of ischemia.    2. Calculated ejection fraction 50% mild hypokinesis of the mid  anterior wall.    Cardiac Catheterization: No results found for this or any previous visit from the past 1825 days.  No results found for this or any previous visit from the past 3650 days.     Cardiac Scoring: No results found for this or any previous visit from the past 1825 days.    AAA : No results found for this or any previous visit from the past 1825 days.    OTHER: No results found for this or any previous visit from the past 1825 days.    LAST IMAGING RESULTS  CT abdomen pelvis wo IV contrast  Narrative: Interpreted By:  Modesto Wu,   STUDY:  CT ABDOMEN  PELVIS WO IV CONTRAST; ;  9/20/2024 12:49 pm      INDICATION:  Signs/Symptoms:flank pain.          COMPARISON:  05/19/2024      ACCESSION NUMBER(S):  [id number]      ORDERING CLINICIAN:  SHAYAN DWYER      TECHNIQUE:  Serial axial unenhanced CT images obtained of the abdomen pelvis.  Images reformatted in the coronal and sagittal projection      All CT examinations are performed with 1 or more of the following  dose reduction techniques: Automated exposure control, adjustment of  mA and/or kv according to patient's size, or use of iterative  reconstruction techniques.      FINDINGS:  Included lung bases demonstrate mild chronic appearing basilar  interstitial changes. No infiltrate or effusion identified. Distal  esophagus is unremarkable      Liver is unremarkable within limits of this unenhanced CT      Spleen and adrenal glands are unremarkable      Interval removal of the previously noted cholecystostomy tube with  surgical staples in the right upper quadrant status post  cholecystectomy.      Pancreas is unremarkable. There is a focal calcification within the  pancreatic head at the margin with 2nd portion of the duodenum. This  appears separate from the area of the ampulla and distribution of the  common bile duct. Otherwise, pancreas is unremarkable within limits  of this unenhanced CT.      Right kidney demonstrates no hydronephrosis or nephrolithiasis.  Visualized course of the right ureter is unremarkable      Left kidney demonstrates no hydronephrosis or nephrolithiasis.  Visualized course of the left ureter is unremarkable.      Retroperitoneum demonstrates diffuse vascular calcification of the  abdominal aorta. There is no lymphadenopathy. No ascites demonstrated      Loops of large bowel demonstrate uncomplicated sigmoid  diverticulosis. Descending colon is contracted limiting  characterization. No pericolonic fat stranding demonstrated. Small  bowel loops are nondilated. There is no  lymphadenopathy in the  mesentery. Stomach is contracted limiting characterization.      CT pelvis:      Unopacified bladder demonstrates mild thick-walled appearance in  relation to contraction. There is no stranding of the perivesical  fat. No pelvic lymphadenopathy. No free fluid demonstrated. Status  post hysterectomy.      Visualized osseous structures demonstrates spinal fusion from L2  through L5 with interbody spacer placement at L5/S1. Laminectomy  changes are demonstrated from L3 through L5. Limited characterization  of the thecal sac within limits of the CT. However, no significant  stenosis demonstrated.                              Impression: 1. No obstructive uropathy. No nephrolithiasis demonstrated      2. Interval removal of cholecystostomy tube with cholecystectomy  demonstrated. No stranding of the fat      3. Spinal fusion lower lumbar spine with laminectomy changes with  artifact limiting characterization of the thecal sac. However, no  significant narrowing within limits of the CT.      4. Uncomplicated sigmoid diverticulosis.          MACRO:  None      Signed by: Modesto Wu 9/20/2024 1:20 PM  Dictation workstation:   [id number]  XR chest 1 view  Narrative: Interpreted By:  Manuel Callaway,   STUDY:  XR CHEST 1 VIEW      INDICATION:  Signs/Symptoms:cough.      COMPARISON:  September 16, 2024      ACCESSION NUMBER(S):  [id number]      ORDERING CLINICIAN:  SHAYAN DWYER      FINDINGS:  Right-sided central line again noted SVC right atrial junction  unchanged. No consolidation, effusion, edema, or pneumothorax.      Impression: No evidence of acute intrathoracic abnormality.      Signed by: Manuel Callaway 9/20/2024 11:39 AM  Dictation workstation:   [id number]  ECG 12 lead  Sinus rhythm  Borderline prolonged QT interval    See ED provider note for full interpretation and clinical correlation  Confirmed by Terri Bermudez ([id number]) on 9/20/2024 12:36:39 PM      Problem List Items  Addressed This Visit       Hyperlipidemia (Chronic)    Myelodysplastic syndrome (Multi) (Chronic)    Paroxysmal atrial fibrillation (Multi) - Primary    Essential (primary) hypertension    Type 2 diabetes mellitus with hyperglycemia, without long-term current use of insulin (Multi)    ESRD (end stage renal disease) on dialysis (Multi) (Chronic)    Anemia due to chronic kidney disease, on chronic dialysis (Multi) (Chronic)    (HFpEF) heart failure with preserved ejection fraction (Multi)            Humphrey Callaway DO, FACC, FACOI

## 2024-09-30 ENCOUNTER — TELEPHONE (OUTPATIENT)
Dept: NEPHROLOGY | Facility: HOSPITAL | Age: 79
End: 2024-09-30

## 2024-09-30 DIAGNOSIS — R31.29 OTHER MICROSCOPIC HEMATURIA: Primary | ICD-10-CM

## 2024-10-01 ENCOUNTER — TELEPHONE (OUTPATIENT)
Dept: PRIMARY CARE | Facility: CLINIC | Age: 79
End: 2024-10-01

## 2024-10-01 ENCOUNTER — APPOINTMENT (OUTPATIENT)
Dept: CARDIOLOGY | Facility: CLINIC | Age: 79
End: 2024-10-01
Payer: MEDICARE

## 2024-10-01 VITALS
HEIGHT: 61 IN | WEIGHT: 141.25 LBS | BODY MASS INDEX: 26.67 KG/M2 | DIASTOLIC BLOOD PRESSURE: 58 MMHG | HEART RATE: 58 BPM | SYSTOLIC BLOOD PRESSURE: 112 MMHG

## 2024-10-01 DIAGNOSIS — I10 ESSENTIAL (PRIMARY) HYPERTENSION: ICD-10-CM

## 2024-10-01 DIAGNOSIS — N18.6 ESRD (END STAGE RENAL DISEASE) ON DIALYSIS (MULTI): Chronic | ICD-10-CM

## 2024-10-01 DIAGNOSIS — I48.0 PAROXYSMAL ATRIAL FIBRILLATION (MULTI): Primary | ICD-10-CM

## 2024-10-01 DIAGNOSIS — D46.9 MYELODYSPLASTIC SYNDROME (MULTI): Chronic | ICD-10-CM

## 2024-10-01 DIAGNOSIS — E11.65 TYPE 2 DIABETES MELLITUS WITH HYPERGLYCEMIA, WITHOUT LONG-TERM CURRENT USE OF INSULIN: Primary | ICD-10-CM

## 2024-10-01 DIAGNOSIS — Z99.2 ANEMIA DUE TO CHRONIC KIDNEY DISEASE, ON CHRONIC DIALYSIS (MULTI): Chronic | ICD-10-CM

## 2024-10-01 DIAGNOSIS — D63.1 ANEMIA DUE TO CHRONIC KIDNEY DISEASE, ON CHRONIC DIALYSIS (MULTI): Chronic | ICD-10-CM

## 2024-10-01 DIAGNOSIS — I50.32 CHRONIC HEART FAILURE WITH PRESERVED EJECTION FRACTION: ICD-10-CM

## 2024-10-01 DIAGNOSIS — E78.00 PURE HYPERCHOLESTEROLEMIA: Chronic | ICD-10-CM

## 2024-10-01 DIAGNOSIS — Z99.2 ESRD (END STAGE RENAL DISEASE) ON DIALYSIS (MULTI): Chronic | ICD-10-CM

## 2024-10-01 DIAGNOSIS — I50.30 HEART FAILURE WITH PRESERVED EJECTION FRACTION, UNSPECIFIED HF CHRONICITY: ICD-10-CM

## 2024-10-01 DIAGNOSIS — J44.9 COPD WITHOUT EXACERBATION (MULTI): Chronic | ICD-10-CM

## 2024-10-01 DIAGNOSIS — N18.6 ANEMIA DUE TO CHRONIC KIDNEY DISEASE, ON CHRONIC DIALYSIS (MULTI): Chronic | ICD-10-CM

## 2024-10-01 DIAGNOSIS — E11.65 TYPE 2 DIABETES MELLITUS WITH HYPERGLYCEMIA, WITHOUT LONG-TERM CURRENT USE OF INSULIN: ICD-10-CM

## 2024-10-01 PROCEDURE — 1157F ADVNC CARE PLAN IN RCRD: CPT | Performed by: INTERNAL MEDICINE

## 2024-10-01 PROCEDURE — 1159F MED LIST DOCD IN RCRD: CPT | Performed by: INTERNAL MEDICINE

## 2024-10-01 PROCEDURE — 1111F DSCHRG MED/CURRENT MED MERGE: CPT | Performed by: INTERNAL MEDICINE

## 2024-10-01 PROCEDURE — 99214 OFFICE O/P EST MOD 30 MIN: CPT | Performed by: INTERNAL MEDICINE

## 2024-10-01 PROCEDURE — 1036F TOBACCO NON-USER: CPT | Performed by: INTERNAL MEDICINE

## 2024-10-01 PROCEDURE — 93000 ELECTROCARDIOGRAM COMPLETE: CPT | Performed by: INTERNAL MEDICINE

## 2024-10-01 PROCEDURE — 3074F SYST BP LT 130 MM HG: CPT | Performed by: INTERNAL MEDICINE

## 2024-10-01 PROCEDURE — 3078F DIAST BP <80 MM HG: CPT | Performed by: INTERNAL MEDICINE

## 2024-10-01 NOTE — TELEPHONE ENCOUNTER
Patient was in and wanted to let the Dr know that she is very ill.  She is very weak and says she still has the UTI.  She states the medication she was given for the UTI is not helping.  Please advise.

## 2024-10-02 ENCOUNTER — LAB (OUTPATIENT)
Dept: LAB | Facility: LAB | Age: 79
End: 2024-10-02
Payer: MEDICARE

## 2024-10-02 DIAGNOSIS — R31.29 OTHER MICROSCOPIC HEMATURIA: ICD-10-CM

## 2024-10-02 LAB
APPEARANCE UR: ABNORMAL
BACTERIA #/AREA URNS AUTO: ABNORMAL /HPF
BILIRUB UR STRIP.AUTO-MCNC: NEGATIVE MG/DL
COLOR UR: YELLOW
GLUCOSE UR STRIP.AUTO-MCNC: ABNORMAL MG/DL
HOLD SPECIMEN: NORMAL
HYALINE CASTS #/AREA URNS AUTO: ABNORMAL /LPF
KETONES UR STRIP.AUTO-MCNC: NEGATIVE MG/DL
LEUKOCYTE ESTERASE UR QL STRIP.AUTO: ABNORMAL
MUCOUS THREADS #/AREA URNS AUTO: ABNORMAL /LPF
NITRITE UR QL STRIP.AUTO: NEGATIVE
PH UR STRIP.AUTO: 7.5 [PH]
PROT UR STRIP.AUTO-MCNC: ABNORMAL MG/DL
RBC # UR STRIP.AUTO: NEGATIVE /UL
RBC #/AREA URNS AUTO: ABNORMAL /HPF
SP GR UR STRIP.AUTO: 1.02
SQUAMOUS #/AREA URNS AUTO: ABNORMAL /HPF
UROBILINOGEN UR STRIP.AUTO-MCNC: NORMAL MG/DL
WBC #/AREA URNS AUTO: ABNORMAL /HPF

## 2024-10-02 PROCEDURE — 87186 SC STD MICRODIL/AGAR DIL: CPT

## 2024-10-02 PROCEDURE — 87086 URINE CULTURE/COLONY COUNT: CPT

## 2024-10-02 PROCEDURE — 81001 URINALYSIS AUTO W/SCOPE: CPT

## 2024-10-04 LAB — BACTERIA UR CULT: NORMAL

## 2024-10-08 ENCOUNTER — HOSPITAL ENCOUNTER (INPATIENT)
Facility: HOSPITAL | Age: 79
LOS: 3 days | Discharge: HOME | End: 2024-10-12
Attending: EMERGENCY MEDICINE | Admitting: INTERNAL MEDICINE
Payer: MEDICARE

## 2024-10-08 ENCOUNTER — APPOINTMENT (OUTPATIENT)
Dept: CARDIOLOGY | Facility: HOSPITAL | Age: 79
End: 2024-10-08
Payer: MEDICARE

## 2024-10-08 ENCOUNTER — OFFICE VISIT (OUTPATIENT)
Dept: PRIMARY CARE | Facility: CLINIC | Age: 79
End: 2024-10-08
Payer: MEDICARE

## 2024-10-08 VITALS
HEART RATE: 75 BPM | TEMPERATURE: 96.9 F | SYSTOLIC BLOOD PRESSURE: 162 MMHG | DIASTOLIC BLOOD PRESSURE: 72 MMHG | RESPIRATION RATE: 22 BRPM | OXYGEN SATURATION: 96 %

## 2024-10-08 DIAGNOSIS — I10 ESSENTIAL (PRIMARY) HYPERTENSION: ICD-10-CM

## 2024-10-08 DIAGNOSIS — N39.0 COMPLICATED URINARY TRACT INFECTION: Primary | ICD-10-CM

## 2024-10-08 DIAGNOSIS — N18.6 ANEMIA DUE TO CHRONIC KIDNEY DISEASE, ON CHRONIC DIALYSIS (MULTI): ICD-10-CM

## 2024-10-08 DIAGNOSIS — F41.9 ANXIETY: ICD-10-CM

## 2024-10-08 DIAGNOSIS — R30.0 DYSURIA: Primary | ICD-10-CM

## 2024-10-08 DIAGNOSIS — N30.00 ACUTE CYSTITIS WITHOUT HEMATURIA: ICD-10-CM

## 2024-10-08 DIAGNOSIS — D63.1 ANEMIA DUE TO CHRONIC KIDNEY DISEASE, ON CHRONIC DIALYSIS (MULTI): ICD-10-CM

## 2024-10-08 DIAGNOSIS — R26.2 INABILITY TO AMBULATE DUE TO MULTIPLE JOINTS: ICD-10-CM

## 2024-10-08 DIAGNOSIS — Z78.9 FAILURE OF OUTPATIENT TREATMENT: ICD-10-CM

## 2024-10-08 DIAGNOSIS — Z99.2 ANEMIA DUE TO CHRONIC KIDNEY DISEASE, ON CHRONIC DIALYSIS (MULTI): ICD-10-CM

## 2024-10-08 DIAGNOSIS — R53.1 GENERALIZED WEAKNESS: ICD-10-CM

## 2024-10-08 LAB
ABO GROUP (TYPE) IN BLOOD: NORMAL
ALBUMIN SERPL BCP-MCNC: 4.1 G/DL (ref 3.4–5)
ALP SERPL-CCNC: 185 U/L (ref 33–136)
ALT SERPL W P-5'-P-CCNC: 36 U/L (ref 7–45)
ANION GAP SERPL CALC-SCNC: 10 MMOL/L (ref 10–20)
ANTIBODY SCREEN: NORMAL
AST SERPL W P-5'-P-CCNC: 27 U/L (ref 9–39)
BASOPHILS # BLD AUTO: 0.03 X10*3/UL (ref 0–0.1)
BASOPHILS NFR BLD AUTO: 0.7 %
BILIRUB SERPL-MCNC: 0.9 MG/DL (ref 0–1.2)
BLOOD EXPIRATION DATE: NORMAL
BUN SERPL-MCNC: 21 MG/DL (ref 6–23)
CALCIUM SERPL-MCNC: 9.3 MG/DL (ref 8.6–10.3)
CHLORIDE SERPL-SCNC: 99 MMOL/L (ref 98–107)
CO2 SERPL-SCNC: 32 MMOL/L (ref 21–32)
CREAT SERPL-MCNC: 2.04 MG/DL (ref 0.5–1.05)
DACRYOCYTES BLD QL SMEAR: NORMAL
DISPENSE STATUS: NORMAL
EGFRCR SERPLBLD CKD-EPI 2021: 24 ML/MIN/1.73M*2
EOSINOPHIL # BLD AUTO: 0.16 X10*3/UL (ref 0–0.4)
EOSINOPHIL NFR BLD AUTO: 3.5 %
ERYTHROCYTE [DISTWIDTH] IN BLOOD BY AUTOMATED COUNT: 20.8 % (ref 11.5–14.5)
GLUCOSE SERPL-MCNC: 136 MG/DL (ref 74–99)
HCT VFR BLD AUTO: 19.6 % (ref 36–46)
HGB BLD-MCNC: 6.6 G/DL (ref 12–16)
IMM GRANULOCYTES # BLD AUTO: 0.02 X10*3/UL (ref 0–0.5)
IMM GRANULOCYTES NFR BLD AUTO: 0.4 % (ref 0–0.9)
LACTATE SERPL-SCNC: 0.9 MMOL/L (ref 0.4–2)
LYMPHOCYTES # BLD AUTO: 1.17 X10*3/UL (ref 0.8–3)
LYMPHOCYTES NFR BLD AUTO: 25.7 %
MCH RBC QN AUTO: 31.9 PG (ref 26–34)
MCHC RBC AUTO-ENTMCNC: 33.7 G/DL (ref 32–36)
MCV RBC AUTO: 95 FL (ref 80–100)
MONOCYTES # BLD AUTO: 0.45 X10*3/UL (ref 0.05–0.8)
MONOCYTES NFR BLD AUTO: 9.9 %
NEUTROPHILS # BLD AUTO: 2.72 X10*3/UL (ref 1.6–5.5)
NEUTROPHILS NFR BLD AUTO: 59.8 %
NRBC BLD-RTO: 0 /100 WBCS (ref 0–0)
OVALOCYTES BLD QL SMEAR: NORMAL
PLATELET # BLD AUTO: 232 X10*3/UL (ref 150–450)
POC APPEARANCE, URINE: ABNORMAL
POC BILIRUBIN, URINE: ABNORMAL
POC BLOOD, URINE: ABNORMAL
POC COLOR, URINE: ABNORMAL
POC GLUCOSE, URINE: NEGATIVE MG/DL
POC KETONES, URINE: NEGATIVE MG/DL
POC LEUKOCYTES, URINE: ABNORMAL
POC NITRITE,URINE: NEGATIVE
POC PH, URINE: 8.5 PH
POC PROTEIN, URINE: ABNORMAL MG/DL
POC SPECIFIC GRAVITY, URINE: 1.01
POC UROBILINOGEN, URINE: 0.2 EU/DL
POLYCHROMASIA BLD QL SMEAR: NORMAL
POTASSIUM SERPL-SCNC: 3.4 MMOL/L (ref 3.5–5.3)
PRODUCT BLOOD TYPE: 6200
PRODUCT CODE: NORMAL
PROT SERPL-MCNC: 6.4 G/DL (ref 6.4–8.2)
RBC # BLD AUTO: 2.07 X10*6/UL (ref 4–5.2)
RBC MORPH BLD: NORMAL
RH FACTOR (ANTIGEN D): NORMAL
SARS-COV-2 RNA RESP QL NAA+PROBE: NOT DETECTED
SODIUM SERPL-SCNC: 138 MMOL/L (ref 136–145)
UNIT ABO: NORMAL
UNIT NUMBER: NORMAL
UNIT RH: NORMAL
UNIT VOLUME: 350
WBC # BLD AUTO: 4.6 X10*3/UL (ref 4.4–11.3)
XM INTEP: NORMAL

## 2024-10-08 PROCEDURE — 1159F MED LIST DOCD IN RCRD: CPT

## 2024-10-08 PROCEDURE — 86902 BLOOD TYPE ANTIGEN DONOR EA: CPT

## 2024-10-08 PROCEDURE — 99285 EMERGENCY DEPT VISIT HI MDM: CPT | Mod: 25

## 2024-10-08 PROCEDURE — 81002 URINALYSIS NONAUTO W/O SCOPE: CPT

## 2024-10-08 PROCEDURE — 83605 ASSAY OF LACTIC ACID: CPT

## 2024-10-08 PROCEDURE — P9040 RBC LEUKOREDUCED IRRADIATED: HCPCS

## 2024-10-08 PROCEDURE — 3078F DIAST BP <80 MM HG: CPT

## 2024-10-08 PROCEDURE — 36415 COLL VENOUS BLD VENIPUNCTURE: CPT

## 2024-10-08 PROCEDURE — 1111F DSCHRG MED/CURRENT MED MERGE: CPT

## 2024-10-08 PROCEDURE — 87086 URINE CULTURE/COLONY COUNT: CPT

## 2024-10-08 PROCEDURE — 93005 ELECTROCARDIOGRAM TRACING: CPT

## 2024-10-08 PROCEDURE — 2500000004 HC RX 250 GENERAL PHARMACY W/ HCPCS (ALT 636 FOR OP/ED)

## 2024-10-08 PROCEDURE — 86922 COMPATIBILITY TEST ANTIGLOB: CPT

## 2024-10-08 PROCEDURE — 1036F TOBACCO NON-USER: CPT

## 2024-10-08 PROCEDURE — 96365 THER/PROPH/DIAG IV INF INIT: CPT

## 2024-10-08 PROCEDURE — 99213 OFFICE O/P EST LOW 20 MIN: CPT

## 2024-10-08 PROCEDURE — 36430 TRANSFUSION BLD/BLD COMPNT: CPT

## 2024-10-08 PROCEDURE — 3077F SYST BP >= 140 MM HG: CPT

## 2024-10-08 PROCEDURE — 99223 1ST HOSP IP/OBS HIGH 75: CPT

## 2024-10-08 PROCEDURE — 1126F AMNT PAIN NOTED NONE PRSNT: CPT

## 2024-10-08 PROCEDURE — 85025 COMPLETE CBC W/AUTO DIFF WBC: CPT

## 2024-10-08 PROCEDURE — 84075 ASSAY ALKALINE PHOSPHATASE: CPT

## 2024-10-08 PROCEDURE — 86901 BLOOD TYPING SEROLOGIC RH(D): CPT

## 2024-10-08 PROCEDURE — 87635 SARS-COV-2 COVID-19 AMP PRB: CPT

## 2024-10-08 PROCEDURE — 1157F ADVNC CARE PLAN IN RCRD: CPT

## 2024-10-08 PROCEDURE — 96366 THER/PROPH/DIAG IV INF ADDON: CPT

## 2024-10-08 RX ORDER — ALLOPURINOL 100 MG/1
1 TABLET ORAL
COMMUNITY
Start: 2024-09-24

## 2024-10-08 RX ORDER — PANTOPRAZOLE SODIUM 40 MG/1
40 TABLET, DELAYED RELEASE ORAL DAILY
Status: DISCONTINUED | OUTPATIENT
Start: 2024-10-09 | End: 2024-10-12 | Stop reason: HOSPADM

## 2024-10-08 RX ORDER — CEFTRIAXONE 1 G/50ML
1 INJECTION, SOLUTION INTRAVENOUS EVERY 24 HOURS
Status: DISCONTINUED | OUTPATIENT
Start: 2024-10-09 | End: 2024-10-12 | Stop reason: HOSPADM

## 2024-10-08 RX ORDER — LOSARTAN POTASSIUM 50 MG/1
50 TABLET ORAL DAILY
Status: DISCONTINUED | OUTPATIENT
Start: 2024-10-09 | End: 2024-10-08

## 2024-10-08 RX ORDER — PANTOPRAZOLE SODIUM 40 MG/10ML
40 INJECTION, POWDER, LYOPHILIZED, FOR SOLUTION INTRAVENOUS DAILY
Status: DISCONTINUED | OUTPATIENT
Start: 2024-10-09 | End: 2024-10-12 | Stop reason: HOSPADM

## 2024-10-08 RX ORDER — SEVELAMER CARBONATE 800 MG/1
TABLET, FILM COATED ORAL
COMMUNITY
Start: 2024-05-01

## 2024-10-08 RX ORDER — HEPARIN SODIUM 1000 [USP'U]/ML
2000 INJECTION, SOLUTION INTRAVENOUS; SUBCUTANEOUS
Status: DISCONTINUED | OUTPATIENT
Start: 2024-10-09 | End: 2024-10-12 | Stop reason: HOSPADM

## 2024-10-08 RX ORDER — POTASSIUM CHLORIDE 20 MEQ/1
20 TABLET, EXTENDED RELEASE ORAL ONCE
Status: COMPLETED | OUTPATIENT
Start: 2024-10-09 | End: 2024-10-09

## 2024-10-08 RX ORDER — DEXTROSE 50 % IN WATER (D50W) INTRAVENOUS SYRINGE
12.5
Status: DISCONTINUED | OUTPATIENT
Start: 2024-10-08 | End: 2024-10-12 | Stop reason: HOSPADM

## 2024-10-08 RX ORDER — POLYETHYLENE GLYCOL 3350 17 G/17G
17 POWDER, FOR SOLUTION ORAL DAILY PRN
Status: DISCONTINUED | OUTPATIENT
Start: 2024-10-08 | End: 2024-10-12 | Stop reason: HOSPADM

## 2024-10-08 RX ORDER — INSULIN LISPRO 100 [IU]/ML
0-10 INJECTION, SOLUTION INTRAVENOUS; SUBCUTANEOUS
Status: DISCONTINUED | OUTPATIENT
Start: 2024-10-08 | End: 2024-10-12 | Stop reason: HOSPADM

## 2024-10-08 RX ORDER — SITAGLIPTIN 25 MG/1
TABLET ORAL
COMMUNITY
Start: 2024-05-30 | End: 2024-10-12 | Stop reason: HOSPADM

## 2024-10-08 RX ORDER — HEPARIN SODIUM 5000 [USP'U]/ML
5000 INJECTION, SOLUTION INTRAVENOUS; SUBCUTANEOUS EVERY 8 HOURS SCHEDULED
Status: DISCONTINUED | OUTPATIENT
Start: 2024-10-08 | End: 2024-10-12 | Stop reason: HOSPADM

## 2024-10-08 RX ORDER — ONDANSETRON HYDROCHLORIDE 2 MG/ML
4 INJECTION, SOLUTION INTRAVENOUS EVERY 6 HOURS PRN
Status: DISCONTINUED | OUTPATIENT
Start: 2024-10-08 | End: 2024-10-12 | Stop reason: HOSPADM

## 2024-10-08 RX ORDER — HYDRALAZINE HYDROCHLORIDE 25 MG/1
25 TABLET, FILM COATED ORAL 3 TIMES DAILY PRN
Status: DISCONTINUED | OUTPATIENT
Start: 2024-10-08 | End: 2024-10-12 | Stop reason: HOSPADM

## 2024-10-08 RX ORDER — TALC
3 POWDER (GRAM) TOPICAL NIGHTLY PRN
Status: DISCONTINUED | OUTPATIENT
Start: 2024-10-08 | End: 2024-10-12 | Stop reason: HOSPADM

## 2024-10-08 RX ORDER — GUAIFENESIN 600 MG/1
600 TABLET, EXTENDED RELEASE ORAL EVERY 12 HOURS PRN
Status: DISCONTINUED | OUTPATIENT
Start: 2024-10-08 | End: 2024-10-12 | Stop reason: HOSPADM

## 2024-10-08 RX ORDER — ENOXAPARIN SODIUM 100 MG/ML
30 INJECTION SUBCUTANEOUS EVERY 24 HOURS
Status: DISCONTINUED | OUTPATIENT
Start: 2024-10-08 | End: 2024-10-08 | Stop reason: ALTCHOICE

## 2024-10-08 RX ORDER — CEFTRIAXONE 2 G/50ML
2 INJECTION, SOLUTION INTRAVENOUS ONCE
Status: COMPLETED | OUTPATIENT
Start: 2024-10-08 | End: 2024-10-08

## 2024-10-08 RX ORDER — DEXTROSE 50 % IN WATER (D50W) INTRAVENOUS SYRINGE
25
Status: DISCONTINUED | OUTPATIENT
Start: 2024-10-08 | End: 2024-10-12 | Stop reason: HOSPADM

## 2024-10-08 RX ORDER — CARVEDILOL 6.25 MG/1
25 TABLET ORAL 2 TIMES DAILY
Status: DISCONTINUED | OUTPATIENT
Start: 2024-10-08 | End: 2024-10-08

## 2024-10-08 RX ORDER — CARVEDILOL 25 MG/1
25 TABLET ORAL 2 TIMES DAILY
Status: DISCONTINUED | OUTPATIENT
Start: 2024-10-09 | End: 2024-10-12 | Stop reason: HOSPADM

## 2024-10-08 RX ORDER — LOSARTAN POTASSIUM 50 MG/1
50 TABLET ORAL DAILY
Status: DISCONTINUED | OUTPATIENT
Start: 2024-10-09 | End: 2024-10-12 | Stop reason: HOSPADM

## 2024-10-08 RX ORDER — COLCHICINE 0.6 MG/1
TABLET ORAL
COMMUNITY
Start: 2024-09-24 | End: 2024-10-12 | Stop reason: HOSPADM

## 2024-10-08 SDOH — SOCIAL STABILITY: SOCIAL INSECURITY: ABUSE: ADULT

## 2024-10-08 SDOH — ECONOMIC STABILITY: FOOD INSECURITY: WITHIN THE PAST 12 MONTHS, THE FOOD YOU BOUGHT JUST DIDN'T LAST AND YOU DIDN'T HAVE MONEY TO GET MORE.: NEVER TRUE

## 2024-10-08 SDOH — SOCIAL STABILITY: SOCIAL INSECURITY: WERE YOU ABLE TO COMPLETE ALL THE BEHAVIORAL HEALTH SCREENINGS?: YES

## 2024-10-08 SDOH — SOCIAL STABILITY: SOCIAL INSECURITY: HAVE YOU HAD THOUGHTS OF HARMING ANYONE ELSE?: NO

## 2024-10-08 SDOH — ECONOMIC STABILITY: FOOD INSECURITY: WITHIN THE PAST 12 MONTHS, YOU WORRIED THAT YOUR FOOD WOULD RUN OUT BEFORE YOU GOT MONEY TO BUY MORE.: NEVER TRUE

## 2024-10-08 ASSESSMENT — PATIENT HEALTH QUESTIONNAIRE - PHQ9
2. FEELING DOWN, DEPRESSED OR HOPELESS: NOT AT ALL
1. LITTLE INTEREST OR PLEASURE IN DOING THINGS: NOT AT ALL
SUM OF ALL RESPONSES TO PHQ9 QUESTIONS 1 & 2: 0
2. FEELING DOWN, DEPRESSED OR HOPELESS: NOT AT ALL
SUM OF ALL RESPONSES TO PHQ9 QUESTIONS 1 & 2: 0
1. LITTLE INTEREST OR PLEASURE IN DOING THINGS: NOT AT ALL

## 2024-10-08 ASSESSMENT — LIFESTYLE VARIABLES
HOW MANY STANDARD DRINKS CONTAINING ALCOHOL DO YOU HAVE ON A TYPICAL DAY: PATIENT DOES NOT DRINK
SKIP TO QUESTIONS 9-10: 1
HOW OFTEN DO YOU HAVE 6 OR MORE DRINKS ON ONE OCCASION: NEVER
HOW OFTEN DO YOU HAVE A DRINK CONTAINING ALCOHOL: NEVER
EVER HAD A DRINK FIRST THING IN THE MORNING TO STEADY YOUR NERVES TO GET RID OF A HANGOVER: NO
HOW OFTEN DO YOU HAVE A DRINK CONTAINING ALCOHOL: NEVER
SKIP TO QUESTIONS 9-10: 1
HAVE YOU EVER FELT YOU SHOULD CUT DOWN ON YOUR DRINKING: NO
AUDIT-C TOTAL SCORE: 0
EVER FELT BAD OR GUILTY ABOUT YOUR DRINKING: NO
TOTAL SCORE: 0
HOW OFTEN DO YOU HAVE SIX OR MORE DRINKS ON ONE OCCASION: NEVER
HAVE PEOPLE ANNOYED YOU BY CRITICIZING YOUR DRINKING: NO
AUDIT-C TOTAL SCORE: 0
HOW MANY STANDARD DRINKS CONTAINING ALCOHOL DO YOU HAVE ON A TYPICAL DAY: PATIENT DOES NOT DRINK
AUDIT-C TOTAL SCORE: 0

## 2024-10-08 ASSESSMENT — ENCOUNTER SYMPTOMS
CARDIOVASCULAR NEGATIVE: 1
ENDOCRINE NEGATIVE: 1
FLANK PAIN: 1
FATIGUE: 1
HEMATOLOGIC/LYMPHATIC NEGATIVE: 1
PSYCHIATRIC NEGATIVE: 1
NEUROLOGICAL NEGATIVE: 1
GASTROINTESTINAL NEGATIVE: 1
EYES NEGATIVE: 1
RESPIRATORY NEGATIVE: 1
CHILLS: 1
FEVER: 1

## 2024-10-08 ASSESSMENT — COGNITIVE AND FUNCTIONAL STATUS - GENERAL
DAILY ACTIVITIY SCORE: 24
MOBILITY SCORE: 24
PATIENT BASELINE BEDBOUND: NO

## 2024-10-08 ASSESSMENT — PAIN - FUNCTIONAL ASSESSMENT: PAIN_FUNCTIONAL_ASSESSMENT: 0-10

## 2024-10-08 ASSESSMENT — ACTIVITIES OF DAILY LIVING (ADL)
TOILETING: INDEPENDENT
JUDGMENT_ADEQUATE_SAFELY_COMPLETE_DAILY_ACTIVITIES: YES
ADEQUATE_TO_COMPLETE_ADL: YES
ASSISTIVE_DEVICE: DENTURES LOWER;DENTURES UPPER;WALKER
HEARING - RIGHT EAR: FUNCTIONAL
FEEDING YOURSELF: INDEPENDENT
BATHING: INDEPENDENT
DRESSING YOURSELF: INDEPENDENT
PATIENT'S MEMORY ADEQUATE TO SAFELY COMPLETE DAILY ACTIVITIES?: YES
WALKS IN HOME: INDEPENDENT
HEARING - LEFT EAR: FUNCTIONAL
GROOMING: INDEPENDENT

## 2024-10-08 ASSESSMENT — PAIN SCALES - GENERAL
PAINLEVEL: 0-NO PAIN
PAINLEVEL_OUTOF10: 0 - NO PAIN

## 2024-10-08 NOTE — PROGRESS NOTES
Subjective   Patient ID: Tana Hargrove is a 79 y.o. female who presents for concerns of UTI.    Reports persistent UTI symptoms for the past few weeks. Has been trialed on oral cephalexins, levofloxacin x 10 days and admitted on 9/23. During hospitalization she was administered IV fluids, continued on oral levofloxacin in setting of oliguria. Ucx on 9/16 was positive for E.coli. IHD MWF.    Today reports worsening fatigue, fevers/chills, back pain for the past several days. Concerned about having to sit in the ED for a prolonged period of time.     Review of Systems   Constitutional:  Positive for chills, fatigue and fever.   HENT: Negative.     Eyes: Negative.    Respiratory: Negative.     Cardiovascular: Negative.    Gastrointestinal: Negative.    Endocrine: Negative.    Genitourinary:  Positive for decreased urine volume and flank pain.   Skin: Negative.    Neurological: Negative.    Hematological: Negative.    Psychiatric/Behavioral: Negative.          Current Outpatient Medications   Medication Sig Dispense Refill    atorvastatin (Lipitor) 10 mg tablet Take 1 tablet (10 mg) by mouth once daily. 30 tablet 2    carvedilol (Coreg) 25 mg tablet Take 1 tablet (25 mg) by mouth 2 times a day. 60 tablet 1    cholecalciferol (Vitamin D-3) 50 MCG (2000 UT) tablet Take 1 tablet (2,000 Units) by mouth once daily.      colchicine 0.6 mg tablet TAKE 1 TABLET BY MOUTH ONCE DAILY AS NEEDED FOR GOUT FLARE      docusate sodium (Colace) 100 mg capsule Take 1 capsule (100 mg) by mouth 2 times a day.      pioglitazone (Actos) 15 mg tablet Take 1 tablet (15 mg) by mouth once daily. 30 tablet 3    allopurinol (Zyloprim) 100 mg tablet Take 1 tablet (100 mg) by mouth every 12 hours.      apixaban (Eliquis) 2.5 mg tablet Take 1 tablet (2.5 mg) by mouth every 12 hours. 60 tablet 0    cyanocobalamin (Vitamin B-12) 1,000 mcg tablet Take 1 tablet (1,000 mcg) by mouth once daily.      losartan (Cozaar) 50 mg tablet Take 1 tablet (50 mg) by  mouth once daily. (Patient not taking: Reported on 10/8/2024) 30 tablet 2    sevelamer carbonate (Renvela) 800 mg tablet Take by mouth.      SITagliptin 25 mg tablet Take by mouth.       No current facility-administered medications for this visit.     Past Surgical History:   Procedure Laterality Date    APPENDECTOMY      BREAST BIOPSY Left     left excisional    BREAST LUMPECTOMY      CHOLECYSTECTOMY  06/06/2024    partial cholecystectomy    COLONOSCOPY      HYSTERECTOMY      JOINT REPLACEMENT      MR HEAD ANGIO WO IV CONTRAST  11/20/2020    MR HEAD ANGIO WO IV CONTRAST 11/20/2020 [facility]    MR NECK ANGIO WO IV CONTRAST  11/20/2020    MR NECK ANGIO WO IV CONTRAST 11/20/2020 [facility]    OOPHORECTOMY      OTHER SURGICAL HISTORY  12/13/2019    Oophorectomy bilateral    OTHER SURGICAL HISTORY  12/13/2019    Tubal ligation    OTHER SURGICAL HISTORY Bilateral 12/13/2019    Knee replacement    OTHER SURGICAL HISTORY Left 12/13/2019    Shoulder surgery    OTHER SURGICAL HISTORY  12/13/2019    Hysterectomy    OTHER SURGICAL HISTORY  12/13/2019    Lumpectomy    OTHER SURGICAL HISTORY Right 12/13/2019    Foot surgery    OTHER SURGICAL HISTORY  04/16/2021    Back surgery     Family History   Problem Relation Name Age of Onset    Heart disease Mother      Heart attack Father      Hodgkin's lymphoma Son        Social History     Tobacco Use    Smoking status: Never     Passive exposure: Never    Smokeless tobacco: Never   Vaping Use    Vaping status: Never Used   Substance Use Topics    Alcohol use: Not Currently    Drug use: Never        Objective     Visit Vitals  /72 (BP Location: Left arm, Patient Position: Sitting)   Pulse 75   Temp 36.1 °C (96.9 °F) (Temporal)   Resp 22   SpO2 96%   OB Status Hysterectomy   Smoking Status Never        Physical Exam  Constitutional:       Appearance: Normal appearance.   HENT:      Head: Normocephalic and atraumatic.   Eyes:      Extraocular Movements:  Extraocular movements intact.      Pupils: Pupils are equal, round, and reactive to light.   Pulmonary:      Effort: Pulmonary effort is normal.   Musculoskeletal:         General: Normal range of motion.   Skin:     General: Skin is warm and dry.      Capillary Refill: Capillary refill takes less than 2 seconds.   Neurological:      General: No focal deficit present.      Mental Status: She is alert and oriented to person, place, and time.   Psychiatric:         Mood and Affect: Mood normal.         Behavior: Behavior normal.           Assessment/Plan   Problem List Items Addressed This Visit       Acute cystitis without hematuria     Patient with persistent low grade fevers/chills, fatigue and flank pain  Treated with oral levofloxacin during hospitalization 9/23, has not improved.   UA today with  cloudy appearance, small bili, large leukocytes and trace blood  Given her appearance,m referred to ED for consideration of admission and administration of IV antibiotics in this patient with ESRD on IHD and oliguria  Phone call to ED Charge RN made prior to patient arrival          Other Visit Diagnoses       Dysuria    -  Primary    Relevant Orders    POCT UA (nonautomated) manually resulted (Completed)    Urine Culture            All pertinent lab work and results were reviewed with patient.     Follow up with Dr. Higgins as previously scheduled    GAB Rush-CNS

## 2024-10-08 NOTE — ED PROVIDER NOTES
Chief Complaint   Patient presents with    UTI       79-year-old female arrives to the emergency department with a chief complaint of generalized weakness.  The patient states that since her discharge on 9/23, where she was discharged with a urinary tract infection, the patient has been on 3 different oral antibiotics.  The patient was seen to have her urine tested which continued to show an infectious process, the patient states that she is not having any burning or increased frequency, however she has lethargy to the point of her entire body feels as if it has been run over.  The patient was nauseous and had emesis at dialysis yesterday and was only able to receive half of her treatment.  On initial assessment the patient appears to be extremely weak, patient having difficulty even holding her head up, the patient is well-known to the emergency department and is chronically pale in appearance, and does require multiple blood transfusions.  The patient denies any new shortness of breath or chest pain, patient denies any dizziness or lightheadedness.  Patient denies any cough.  Patient does endorse a foul odor with her urine.  Patient has an extensive medical history and is current with all medications and follow-up care.  Patient denies any hematuria or rectal bleeding.  Patient denies any abdominal pain.      History provided by:  Patient   used: No         PmHx, PsHx, Allergies, Family Hx, social Hx reviewed as documented    A complete 10 point review of systems was performed and is negative except for as mentioned in the HPI.    Physical Exam:    General: Patient appears weak, and skin is pale chronically.  Patient is AAOx3,  is a good historian, answers questions appropriately    HEENT: head normocephalic, atraumatic, PERRLA, EOMs intact, oropharynx without erythema or exudate, buccal mucosa intact without lesions, TMs unremarkable, nose is patent bilateral    Neck: supple, full ROM,  negative for lymphadenopathy, JVD, thyromegaly, tracheal deviation, nuccal rigidity    Pulmonary: CTAB, no accessory muscle use, able to speak full clear sentences    Cardiac: HRRR, no murmurs, rubs or gallops    GI: soft, non-tender, non-distended, BS + x 4, no masses or organomegaly, no guarding or CVA tenderness noted, negative franco's, mcburney's    Musculoskeletal: Generalized body aches per HPI, otherwise patient full weight bearing, RANDLE, no joint effusions, clubbing or edema noted    Skin: Chronically pale, intact, no lesions or rashes noted, turgor is good.    Neuro: patient follow commands, cranial nerves 2-12 grossly intact, motor strengths 5/5 upper and lower extremities, DTR's and sensation are symmetrical. No focal deficits.    Rectal/: No urinary burning, urgency, change in frequency.  Patient has no rectal complaints        Medical Decision Making  This patient was seen, treated, and evaluated in conjunction with Dr. Alon Patrick    Primary consideration for this patient is multifactorial given the patient's history of chronic weakness, electrolyte abnormality, blood count deficiency, increased urinary tract infection are all considerations.  However the patient is presenting as a failed outpatient treatment for her urinary tract infection and was sent for IV antibiotics.  The patient has had multiple urine cultures, with recent blood cultures not having any growth, most recent showing E. coli resistant to ciprofloxacin which is the antibiotic the patient is currently on.  EKG, diagnostic blood work, urinalysis will be used to further evaluate.    The patients diagnostic blood work shows recurrent chronic anemia for the patient with a hemoglobin at 6.6, the patient will receive 1 unit of packed red blood cells.  Otherwise the patient's diagnostic blood work is similar to comparative studies for the patient.  Patient not showing any signs of urosepsis.    The patient's EKG was done on 10/8 at 1705, the  EKG was interpreted by the attending physician as no STEMI, the EKG was interpreted by me as a sinus rhythm with a rate of 87 a RI interval of 180 and a slightly prolonged QTc of 486, no acute ST abnormality is appreciated    Given the patient is at her baseline, it was discussed with the patient as well as the attending physician as an option to be transfused and discharged home on a different antibiotic.  The patient states that she has been on so many different antibiotics and that she is too weak to perform her ADLs at home and is requesting admission.  Patient started on 2 g of IV Rocephin    Electronic consent was obtained by myself for the transfusion of the packed red blood cells, the patient is very aware of the risks and benefits and has had multiple transfusions in the past.    Jess Dodd the PA for internal medicine service was consulted, the patient's events prior to arrival as well as her ED course were reviewed, the patient will be kept for observation and further treatment and evaluation on MedSurg telemetry under Dr. Cruz.  At the time of admission, the patient was not able to give a urine specimen.       Amount and/or Complexity of Data Reviewed  Labs: ordered. Decision-making details documented in ED Course.       Diagnoses as of 10/08/24 2203   Failure of outpatient treatment   Complicated urinary tract infection   Generalized weakness   Anemia due to chronic kidney disease, on chronic dialysis (Multi)       The patient has had the following imaging during this ER visit: IP CONSULT TO NUTRITION SERVICES  VITAL SIGNS  ED TO FLOOR BED REQUEST     Patient History   Past Medical History:   Diagnosis Date    Abnormal levels of other serum enzymes     Elevated liver enzymes    Acute kidney failure     Anemia     CKD (chronic kidney disease)     COPD (chronic obstructive pulmonary disease) (Multi)     Coronary artery disease     Disease of blood and blood-forming organs, unspecified     Bone marrow  disorder    HLD (hyperlipidemia)     Hypertension     MDS (myelodysplastic syndrome) (Multi)     Personal history of other diseases of the musculoskeletal system and connective tissue     History of muscle pain    Personal history of other specified conditions     History of insomnia    Personal history of other specified conditions     History of edema    Type 2 diabetes mellitus      Past Surgical History:   Procedure Laterality Date    APPENDECTOMY      BREAST BIOPSY Left     left excisional    BREAST LUMPECTOMY      CHOLECYSTECTOMY  06/06/2024    partial cholecystectomy    COLONOSCOPY      HYSTERECTOMY      JOINT REPLACEMENT      MR HEAD ANGIO WO IV CONTRAST  11/20/2020    MR HEAD ANGIO WO IV CONTRAST 11/20/2020 [facility]    MR NECK ANGIO WO IV CONTRAST  11/20/2020    MR NECK ANGIO WO IV CONTRAST 11/20/2020 [facility]    OOPHORECTOMY      OTHER SURGICAL HISTORY  12/13/2019    Oophorectomy bilateral    OTHER SURGICAL HISTORY  12/13/2019    Tubal ligation    OTHER SURGICAL HISTORY Bilateral 12/13/2019    Knee replacement    OTHER SURGICAL HISTORY Left 12/13/2019    Shoulder surgery    OTHER SURGICAL HISTORY  12/13/2019    Hysterectomy    OTHER SURGICAL HISTORY  12/13/2019    Lumpectomy    OTHER SURGICAL HISTORY Right 12/13/2019    Foot surgery    OTHER SURGICAL HISTORY  04/16/2021    Back surgery     Family History   Problem Relation Name Age of Onset    Heart disease Mother      Heart attack Father      Hodgkin's lymphoma Son       Social History     Tobacco Use    Smoking status: Never     Passive exposure: Never    Smokeless tobacco: Never   Vaping Use    Vaping status: Never Used   Substance Use Topics    Alcohol use: Not Currently    Drug use: Never       ED Triage Vitals [10/08/24 1448]   Temperature Heart Rate Respirations BP   36.7 °C (98.1 °F) 77 20 (!) 199/72      Pulse Ox Temp src Heart Rate Source Patient Position   98 % -- -- --      BP Location FiO2 (%)     -- --       Vitals:     10/08/24 1930 10/08/24 2041 10/08/24 2130 10/08/24 2156   BP:    154/70   Pulse: 77  84 77   Resp: (!) 21  (!) 22 (!) 22   Temp:  36.6 °C (97.9 °F)  36.3 °C (97.3 °F)   TempSrc:  Temporal     SpO2: 100%      Weight:       Height:                   GAB Hernandez-CNP  10/08/24 2207

## 2024-10-08 NOTE — ASSESSMENT & PLAN NOTE
Patient with persistent low grade fevers/chills, fatigue and flank pain  Treated with oral levofloxacin during hospitalization 9/23, has not improved.   UA today with  cloudy appearance, small bili, large leukocytes and trace blood  Given her appearance,m referred to ED for consideration of admission and administration of IV antibiotics in this patient with ESRD on IHD and oliguria  Phone call to ED Charge RN made prior to patient arrival

## 2024-10-09 ENCOUNTER — APPOINTMENT (OUTPATIENT)
Dept: DIALYSIS | Facility: HOSPITAL | Age: 79
End: 2024-10-09
Payer: MEDICARE

## 2024-10-09 LAB
ALBUMIN SERPL BCP-MCNC: 3.3 G/DL (ref 3.4–5)
ALP SERPL-CCNC: 129 U/L (ref 33–136)
ALT SERPL W P-5'-P-CCNC: 24 U/L (ref 7–45)
ANION GAP SERPL CALC-SCNC: 12 MMOL/L (ref 10–20)
APPEARANCE UR: CLEAR
AST SERPL W P-5'-P-CCNC: 17 U/L (ref 9–39)
ATRIAL RATE: 87 BPM
BILIRUB SERPL-MCNC: 0.7 MG/DL (ref 0–1.2)
BILIRUB UR STRIP.AUTO-MCNC: NEGATIVE MG/DL
BLOOD EXPIRATION DATE: NORMAL
BUN SERPL-MCNC: 23 MG/DL (ref 6–23)
CALCIUM SERPL-MCNC: 8.9 MG/DL (ref 8.6–10.3)
CHLORIDE SERPL-SCNC: 101 MMOL/L (ref 98–107)
CO2 SERPL-SCNC: 30 MMOL/L (ref 21–32)
COLOR UR: ABNORMAL
CREAT SERPL-MCNC: 2.06 MG/DL (ref 0.5–1.05)
DISPENSE STATUS: NORMAL
EGFRCR SERPLBLD CKD-EPI 2021: 24 ML/MIN/1.73M*2
ERYTHROCYTE [DISTWIDTH] IN BLOOD BY AUTOMATED COUNT: 19.7 % (ref 11.5–14.5)
GLUCOSE BLD MANUAL STRIP-MCNC: 103 MG/DL (ref 74–99)
GLUCOSE BLD MANUAL STRIP-MCNC: 105 MG/DL (ref 74–99)
GLUCOSE BLD MANUAL STRIP-MCNC: 135 MG/DL (ref 74–99)
GLUCOSE BLD MANUAL STRIP-MCNC: 179 MG/DL (ref 74–99)
GLUCOSE SERPL-MCNC: 106 MG/DL (ref 74–99)
GLUCOSE UR STRIP.AUTO-MCNC: NORMAL MG/DL
HCT VFR BLD AUTO: 17.9 % (ref 36–46)
HGB BLD-MCNC: 6 G/DL (ref 12–16)
KETONES UR STRIP.AUTO-MCNC: NEGATIVE MG/DL
LEUKOCYTE ESTERASE UR QL STRIP.AUTO: ABNORMAL
MCH RBC QN AUTO: 31.6 PG (ref 26–34)
MCHC RBC AUTO-ENTMCNC: 33.5 G/DL (ref 32–36)
MCV RBC AUTO: 94 FL (ref 80–100)
NITRITE UR QL STRIP.AUTO: NEGATIVE
NRBC BLD-RTO: 0 /100 WBCS (ref 0–0)
P AXIS: 65 DEGREES
PH UR STRIP.AUTO: 7.5 [PH]
PLATELET # BLD AUTO: 168 X10*3/UL (ref 150–450)
POTASSIUM SERPL-SCNC: 3.6 MMOL/L (ref 3.5–5.3)
PR INTERVAL: 180 MS
PRODUCT BLOOD TYPE: 600
PRODUCT BLOOD TYPE: 6200
PRODUCT BLOOD TYPE: 6200
PRODUCT CODE: NORMAL
PROT SERPL-MCNC: 5.2 G/DL (ref 6.4–8.2)
PROT UR STRIP.AUTO-MCNC: ABNORMAL MG/DL
Q ONSET: 249 MS
QRS COUNT: 14 BEATS
QRS DURATION: 94 MS
QT INTERVAL: 404 MS
QTC CALCULATION(BAZETT): 486 MS
QTC FREDERICIA: 457 MS
R AXIS: 15 DEGREES
RBC # BLD AUTO: 1.9 X10*6/UL (ref 4–5.2)
RBC # UR STRIP.AUTO: NEGATIVE /UL
RBC #/AREA URNS AUTO: NORMAL /HPF
SODIUM SERPL-SCNC: 139 MMOL/L (ref 136–145)
SP GR UR STRIP.AUTO: 1.02
SQUAMOUS #/AREA URNS AUTO: NORMAL /HPF
T AXIS: 43 DEGREES
T OFFSET: 451 MS
UNIT ABO: NORMAL
UNIT NUMBER: NORMAL
UNIT RH: NORMAL
UNIT VOLUME: 281
UNIT VOLUME: 350
UNIT VOLUME: 350
UROBILINOGEN UR STRIP.AUTO-MCNC: NORMAL MG/DL
VENTRICULAR RATE: 87 BPM
WBC # BLD AUTO: 3.2 X10*3/UL (ref 4.4–11.3)
WBC #/AREA URNS AUTO: NORMAL /HPF
XM INTEP: NORMAL

## 2024-10-09 PROCEDURE — 99233 SBSQ HOSP IP/OBS HIGH 50: CPT | Performed by: FAMILY MEDICINE

## 2024-10-09 PROCEDURE — 36430 TRANSFUSION BLD/BLD COMPNT: CPT

## 2024-10-09 PROCEDURE — 81003 URINALYSIS AUTO W/O SCOPE: CPT

## 2024-10-09 PROCEDURE — 82947 ASSAY GLUCOSE BLOOD QUANT: CPT

## 2024-10-09 PROCEDURE — 2500000001 HC RX 250 WO HCPCS SELF ADMINISTERED DRUGS (ALT 637 FOR MEDICARE OP)

## 2024-10-09 PROCEDURE — 84075 ASSAY ALKALINE PHOSPHATASE: CPT

## 2024-10-09 PROCEDURE — 6350000001 HC RX 635 EPOETIN >10,000 UNITS: Performed by: INTERNAL MEDICINE

## 2024-10-09 PROCEDURE — 86902 BLOOD TYPE ANTIGEN DONOR EA: CPT

## 2024-10-09 PROCEDURE — 87086 URINE CULTURE/COLONY COUNT: CPT | Mod: PORLAB

## 2024-10-09 PROCEDURE — 1200000002 HC GENERAL ROOM WITH TELEMETRY DAILY

## 2024-10-09 PROCEDURE — 2500000002 HC RX 250 W HCPCS SELF ADMINISTERED DRUGS (ALT 637 FOR MEDICARE OP, ALT 636 FOR OP/ED)

## 2024-10-09 PROCEDURE — 5A1D70Z PERFORMANCE OF URINARY FILTRATION, INTERMITTENT, LESS THAN 6 HOURS PER DAY: ICD-10-PCS | Performed by: INTERNAL MEDICINE

## 2024-10-09 PROCEDURE — 2500000004 HC RX 250 GENERAL PHARMACY W/ HCPCS (ALT 636 FOR OP/ED)

## 2024-10-09 PROCEDURE — 85027 COMPLETE CBC AUTOMATED: CPT

## 2024-10-09 PROCEDURE — 36415 COLL VENOUS BLD VENIPUNCTURE: CPT

## 2024-10-09 PROCEDURE — 2500000001 HC RX 250 WO HCPCS SELF ADMINISTERED DRUGS (ALT 637 FOR MEDICARE OP): Performed by: FAMILY MEDICINE

## 2024-10-09 PROCEDURE — P9040 RBC LEUKOREDUCED IRRADIATED: HCPCS

## 2024-10-09 PROCEDURE — 8010000001 HC DIALYSIS - HEMODIALYSIS PER DAY

## 2024-10-09 RX ORDER — ACETAMINOPHEN 325 MG/1
650 TABLET ORAL EVERY 4 HOURS PRN
Status: DISCONTINUED | OUTPATIENT
Start: 2024-10-09 | End: 2024-10-12 | Stop reason: HOSPADM

## 2024-10-09 RX ORDER — ACETAMINOPHEN 160 MG/5ML
650 SOLUTION ORAL EVERY 4 HOURS PRN
Status: DISCONTINUED | OUTPATIENT
Start: 2024-10-09 | End: 2024-10-12 | Stop reason: HOSPADM

## 2024-10-09 RX ORDER — ACETAMINOPHEN 650 MG/1
650 SUPPOSITORY RECTAL EVERY 4 HOURS PRN
Status: DISCONTINUED | OUTPATIENT
Start: 2024-10-09 | End: 2024-10-12 | Stop reason: HOSPADM

## 2024-10-09 SDOH — SOCIAL STABILITY: SOCIAL NETWORK: HOW OFTEN DO YOU ATTENT MEETINGS OF THE CLUB OR ORGANIZATION YOU BELONG TO?: NEVER

## 2024-10-09 SDOH — ECONOMIC STABILITY: FOOD INSECURITY: HOW HARD IS IT FOR YOU TO PAY FOR THE VERY BASICS LIKE FOOD, HOUSING, MEDICAL CARE, AND HEATING?: NOT HARD AT ALL

## 2024-10-09 SDOH — SOCIAL STABILITY: SOCIAL INSECURITY
WITHIN THE LAST YEAR, HAVE YOU BEEN KICKED, HIT, SLAPPED, OR OTHERWISE PHYSICALLY HURT BY YOUR PARTNER OR EX-PARTNER?: NO

## 2024-10-09 SDOH — ECONOMIC STABILITY: HOUSING INSECURITY: AT ANY TIME IN THE PAST 12 MONTHS, WERE YOU HOMELESS OR LIVING IN A SHELTER (INCLUDING NOW)?: NO

## 2024-10-09 SDOH — SOCIAL STABILITY: SOCIAL INSECURITY
WITHIN THE LAST YEAR, HAVE YOU BEEN RAPED OR FORCED TO HAVE ANY KIND OF SEXUAL ACTIVITY BY YOUR PARTNER OR EX-PARTNER?: NO

## 2024-10-09 SDOH — ECONOMIC STABILITY: FOOD INSECURITY: WITHIN THE PAST 12 MONTHS, YOU WORRIED THAT YOUR FOOD WOULD RUN OUT BEFORE YOU GOT MONEY TO BUY MORE.: NEVER TRUE

## 2024-10-09 SDOH — HEALTH STABILITY: PHYSICAL HEALTH: ON AVERAGE, HOW MANY DAYS PER WEEK DO YOU ENGAGE IN MODERATE TO STRENUOUS EXERCISE (LIKE A BRISK WALK)?: 0 DAYS

## 2024-10-09 SDOH — SOCIAL STABILITY: SOCIAL NETWORK
DO YOU BELONG TO ANY CLUBS OR ORGANIZATIONS SUCH AS CHURCH GROUPS, UNIONS, FRATERNAL OR ATHLETIC GROUPS, OR SCHOOL GROUPS?: YES

## 2024-10-09 SDOH — SOCIAL STABILITY: SOCIAL NETWORK
IN A TYPICAL WEEK, HOW MANY TIMES DO YOU TALK ON THE PHONE WITH FAMILY, FRIENDS, OR NEIGHBORS?: MORE THAN THREE TIMES A WEEK

## 2024-10-09 SDOH — SOCIAL STABILITY: SOCIAL INSECURITY: WITHIN THE LAST YEAR, HAVE YOU BEEN AFRAID OF YOUR PARTNER OR EX-PARTNER?: NO

## 2024-10-09 SDOH — ECONOMIC STABILITY: FOOD INSECURITY: WITHIN THE PAST 12 MONTHS, YOU WORRIED THAT YOUR FOOD WOULD RUN OUT BEFORE YOU GOT THE MONEY TO BUY MORE.: NEVER TRUE

## 2024-10-09 SDOH — HEALTH STABILITY: MENTAL HEALTH
HOW OFTEN DO YOU NEED TO HAVE SOMEONE HELP YOU WHEN YOU READ INSTRUCTIONS, PAMPHLETS, OR OTHER WRITTEN MATERIAL FROM YOUR DOCTOR OR PHARMACY?: SOMETIMES

## 2024-10-09 SDOH — HEALTH STABILITY: MENTAL HEALTH
DO YOU FEEL STRESS - TENSE, RESTLESS, NERVOUS, OR ANXIOUS, OR UNABLE TO SLEEP AT NIGHT BECAUSE YOUR MIND IS TROUBLED ALL THE TIME - THESE DAYS?: ONLY A LITTLE

## 2024-10-09 SDOH — SOCIAL STABILITY: SOCIAL NETWORK: ARE YOU MARRIED, WIDOWED, DIVORCED, SEPARATED, NEVER MARRIED, OR LIVING WITH A PARTNER?: WIDOWED

## 2024-10-09 SDOH — SOCIAL STABILITY: SOCIAL NETWORK: HOW OFTEN DO YOU ATTEND MEETINGS OF THE CLUBS OR ORGANIZATIONS YOU BELONG TO?: NEVER

## 2024-10-09 SDOH — SOCIAL STABILITY: SOCIAL NETWORK: HOW OFTEN DO YOU ATTEND CHURCH OR RELIGIOUS SERVICES?: MORE THAN 4 TIMES PER YEAR

## 2024-10-09 SDOH — ECONOMIC STABILITY: INCOME INSECURITY: HOW HARD IS IT FOR YOU TO PAY FOR THE VERY BASICS LIKE FOOD, HOUSING, MEDICAL CARE, AND HEATING?: NOT HARD AT ALL

## 2024-10-09 SDOH — SOCIAL STABILITY: SOCIAL INSECURITY: ARE YOU MARRIED, WIDOWED, DIVORCED, SEPARATED, NEVER MARRIED, OR LIVING WITH A PARTNER?: WIDOWED

## 2024-10-09 SDOH — SOCIAL STABILITY: SOCIAL NETWORK
DO YOU BELONG TO ANY CLUBS OR ORGANIZATIONS SUCH AS CHURCH GROUPS UNIONS, FRATERNAL OR ATHLETIC GROUPS, OR SCHOOL GROUPS?: YES

## 2024-10-09 SDOH — ECONOMIC STABILITY: INCOME INSECURITY: IN THE PAST 12 MONTHS, HAS THE ELECTRIC, GAS, OIL, OR WATER COMPANY THREATENED TO SHUT OFF SERVICE IN YOUR HOME?: NO

## 2024-10-09 SDOH — SOCIAL STABILITY: SOCIAL INSECURITY
WITHIN THE LAST YEAR, HAVE TO BEEN RAPED OR FORCED TO HAVE ANY KIND OF SEXUAL ACTIVITY BY YOUR PARTNER OR EX-PARTNER?: NO

## 2024-10-09 SDOH — ECONOMIC STABILITY: INCOME INSECURITY: IN THE LAST 12 MONTHS, WAS THERE A TIME WHEN YOU WERE NOT ABLE TO PAY THE MORTGAGE OR RENT ON TIME?: NO

## 2024-10-09 SDOH — ECONOMIC STABILITY: HOUSING INSECURITY: IN THE LAST 12 MONTHS, WAS THERE A TIME WHEN YOU WERE NOT ABLE TO PAY THE MORTGAGE OR RENT ON TIME?: NO

## 2024-10-09 SDOH — SOCIAL STABILITY: SOCIAL INSECURITY: WITHIN THE LAST YEAR, HAVE YOU BEEN HUMILIATED OR EMOTIONALLY ABUSED IN OTHER WAYS BY YOUR PARTNER OR EX-PARTNER?: NO

## 2024-10-09 SDOH — HEALTH STABILITY: PHYSICAL HEALTH: ON AVERAGE, HOW MANY MINUTES DO YOU ENGAGE IN EXERCISE AT THIS LEVEL?: 0 MIN

## 2024-10-09 SDOH — HEALTH STABILITY: MENTAL HEALTH
STRESS IS WHEN SOMEONE FEELS TENSE, NERVOUS, ANXIOUS, OR CAN'T SLEEP AT NIGHT BECAUSE THEIR MIND IS TROUBLED. HOW STRESSED ARE YOU?: ONLY A LITTLE

## 2024-10-09 SDOH — ECONOMIC STABILITY: FOOD INSECURITY: WITHIN THE PAST 12 MONTHS, THE FOOD YOU BOUGHT JUST DIDN'T LAST AND YOU DIDN'T HAVE MONEY TO GET MORE.: NEVER TRUE

## 2024-10-09 SDOH — SOCIAL STABILITY: SOCIAL NETWORK: HOW OFTEN DO YOU GET TOGETHER WITH FRIENDS OR RELATIVES?: TWICE A WEEK

## 2024-10-09 SDOH — ECONOMIC STABILITY: HOUSING INSECURITY: IN THE PAST 12 MONTHS, HOW MANY TIMES HAVE YOU MOVED WHERE YOU WERE LIVING?: 0

## 2024-10-09 SDOH — ECONOMIC STABILITY: INCOME INSECURITY: IN THE PAST 12 MONTHS HAS THE ELECTRIC, GAS, OIL, OR WATER COMPANY THREATENED TO SHUT OFF SERVICES IN YOUR HOME?: NO

## 2024-10-09 SDOH — ECONOMIC STABILITY: TRANSPORTATION INSECURITY: IN THE PAST 12 MONTHS, HAS LACK OF TRANSPORTATION KEPT YOU FROM MEDICAL APPOINTMENTS OR FROM GETTING MEDICATIONS?: NO

## 2024-10-09 ASSESSMENT — PAIN SCALES - GENERAL
PAINLEVEL_OUTOF10: 3
PAINLEVEL_OUTOF10: 0 - NO PAIN
PAINLEVEL_OUTOF10: 1
PAINLEVEL_OUTOF10: 3
PAINLEVEL_OUTOF10: 0 - NO PAIN
PAINLEVEL_OUTOF10: 1
PAINLEVEL_OUTOF10: 0 - NO PAIN

## 2024-10-09 ASSESSMENT — COGNITIVE AND FUNCTIONAL STATUS - GENERAL
MOVING TO AND FROM BED TO CHAIR: A LITTLE
DAILY ACTIVITIY SCORE: 20
DRESSING REGULAR LOWER BODY CLOTHING: A LITTLE
DAILY ACTIVITIY SCORE: 24
DAILY ACTIVITIY SCORE: 20
MOBILITY SCORE: 18
WALKING IN HOSPITAL ROOM: A LITTLE
HELP NEEDED FOR BATHING: A LITTLE
TURNING FROM BACK TO SIDE WHILE IN FLAT BAD: A LITTLE
TURNING FROM BACK TO SIDE WHILE IN FLAT BAD: A LITTLE
MOBILITY SCORE: 18
TURNING FROM BACK TO SIDE WHILE IN FLAT BAD: A LITTLE
CLIMB 3 TO 5 STEPS WITH RAILING: A LOT
STANDING UP FROM CHAIR USING ARMS: A LITTLE
CLIMB 3 TO 5 STEPS WITH RAILING: A LOT
PERSONAL GROOMING: A LITTLE
MOVING TO AND FROM BED TO CHAIR: A LITTLE
TOILETING: A LITTLE
WALKING IN HOSPITAL ROOM: A LITTLE
DRESSING REGULAR LOWER BODY CLOTHING: A LITTLE
PERSONAL GROOMING: A LITTLE
HELP NEEDED FOR BATHING: A LITTLE
MOBILITY SCORE: 20
WALKING IN HOSPITAL ROOM: A LITTLE
STANDING UP FROM CHAIR USING ARMS: A LITTLE
TOILETING: A LITTLE
CLIMB 3 TO 5 STEPS WITH RAILING: A LOT

## 2024-10-09 ASSESSMENT — ENCOUNTER SYMPTOMS
VOMITING: 1
SHORTNESS OF BREATH: 0
COUGH: 0
FEVER: 0
WHEEZING: 0
CONSTIPATION: 0
PALPITATIONS: 0
JOINT SWELLING: 0
WOUND: 0
DIARRHEA: 0
WEAKNESS: 1
TREMORS: 0
NAUSEA: 1
FLANK PAIN: 0
CHEST TIGHTNESS: 0
HEADACHES: 0
HEMATURIA: 0
FATIGUE: 1
CHILLS: 0
BACK PAIN: 0
ABDOMINAL PAIN: 0

## 2024-10-09 ASSESSMENT — PAIN - FUNCTIONAL ASSESSMENT
PAIN_FUNCTIONAL_ASSESSMENT: 0-10

## 2024-10-09 ASSESSMENT — PAIN DESCRIPTION - LOCATION
LOCATION: HEAD
LOCATION: HEAD

## 2024-10-09 ASSESSMENT — ACTIVITIES OF DAILY LIVING (ADL)
LACK_OF_TRANSPORTATION: NO
LACK_OF_TRANSPORTATION: NO

## 2024-10-09 ASSESSMENT — LIFESTYLE VARIABLES
PRESCIPTION_ABUSE_PAST_12_MONTHS: NO
SUBSTANCE_ABUSE_PAST_12_MONTHS: NO

## 2024-10-09 NOTE — CARE PLAN
The patient's goals for the shift include        Problem: Fall/Injury  Goal: Not fall by end of shift  Outcome: Progressing  Goal: Be free from injury by end of the shift  Outcome: Progressing  Goal: Verbalize understanding of personal risk factors for fall in the hospital  Outcome: Progressing  Goal: Verbalize understanding of risk factor reduction measures to prevent injury from fall in the home  Outcome: Progressing  Goal: Use assistive devices by end of the shift  Outcome: Progressing  Goal: Pace activities to prevent fatigue by end of the shift  Outcome: Progressing     Problem: Skin  Goal: Decreased wound size/increased tissue granulation at next dressing change  Outcome: Progressing  Flowsheets (Taken 10/9/2024 0737)  Decreased wound size/increased tissue granulation at next dressing change: Promote sleep for wound healing  Goal: Participates in plan/prevention/treatment measures  Outcome: Progressing  Flowsheets (Taken 10/9/2024 0737)  Participates in plan/prevention/treatment measures:   Discuss with provider PT/OT consult   Elevate heels   Increase activity/out of bed for meals  Goal: Prevent/manage excess moisture  Outcome: Progressing  Flowsheets (Taken 10/9/2024 0737)  Prevent/manage excess moisture:   Cleanse incontinence/protect with barrier cream   Follow provider orders for dressing changes   Moisturize dry skin   Monitor for/manage infection if present  Goal: Prevent/minimize sheer/friction injuries  Outcome: Progressing  Flowsheets (Taken 10/9/2024 0737)  Prevent/minimize sheer/friction injuries:   Increase activity/out of bed for meals   HOB 30 degrees or less   Turn/reposition every 2 hours/use positioning/transfer devices   Use pull sheet  Goal: Promote/optimize nutrition  Outcome: Progressing  Flowsheets (Taken 10/9/2024 0737)  Promote/optimize nutrition:   Consume > 50% meals/supplements   Monitor/record intake including meals   Offer water/supplements/favorite foods  Goal: Promote skin  healing  Outcome: Progressing  Flowsheets (Taken 10/9/2024 3931)  Promote skin healing:   Assess skin/pad under line(s)/device(s)   Ensure correct size (line/device) and apply per  instructions   Protective dressings over bony prominences   Rotate device position/do not position patient on device   Turn/reposition every 2 hours/use positioning/transfer devices     Problem: Pain - Adult  Goal: Verbalizes/displays adequate comfort level or baseline comfort level  Outcome: Progressing     Problem: Safety - Adult  Goal: Free from fall injury  Outcome: Progressing     Problem: Discharge Planning  Goal: Discharge to home or other facility with appropriate resources  Outcome: Progressing     Problem: Chronic Conditions and Co-morbidities  Goal: Patient's chronic conditions and co-morbidity symptoms are monitored and maintained or improved  Outcome: Progressing     Problem: Pain  Goal: Takes deep breaths with improved pain control throughout the shift  Outcome: Progressing  Goal: Turns in bed with improved pain control throughout the shift  Outcome: Progressing  Goal: Walks with improved pain control throughout the shift  Outcome: Progressing  Goal: Performs ADL's with improved pain control throughout shift  Outcome: Progressing  Goal: Participates in PT with improved pain control throughout the shift  Outcome: Progressing  Goal: Free from opioid side effects throughout the shift  Outcome: Progressing  Goal: Free from acute confusion related to pain meds throughout the shift  Outcome: Progressing

## 2024-10-09 NOTE — H&P
[facility] - GENERAL MEDICINE HISTORY AND PHYSICAL    History Obtained From: Patient  Collateral History: Chart review, ED provider    History Of Present Illness:  Tana Hargrove is a 79 y.o. female with PMHx s/f ESRD on HD (MWF), hypertension, hyperlipidemia, CAD, HFpEF, COPD (no baseline O2), NIDDM2, paroxysmal atrial fibrillation not on anticoagulation secondary to MDS/anemia, chronic anemia of ESRD and myelodysplastic syndrome requiring MARILYNN and intermittent PRBC infusions, osteoarthritis, anxiety, depression, GERD presenting with generalized weakness x 3 weeks. She was hospitalized here on 09/20/24-09/23/24 for generalized weakness, UTI and discharged on Levaquin 50 mg. She finished her course of antibiotics, but still feeling generalized weakness. She went to dialysis on Monday, did 1.5 hours of treatment, did not finish it due to nausea and vomiting. She had decreased appetite since before the previous hospitalization and states it hasn't improved much. Her PCP had another UA done today and told her to come to ED for failed outpatient antibiotic treatment. She denies f/c, lightheadedness, headache, chest pain, sob, abd pain, leg swelling. Patient denies taking any other prescribed medications apart from antibiotics.    ED Course (Summary):   Vitals on presentation: 98.1F, 77 bpm, 20 RR, 199/72, 98% on RA  Labs: CMP-glucose 136, potassium 3.4, BUN/creatinine 21/2.04, alkaline phosphatase 185  Lactate 0.9  CBC-hemoglobin 6.6  Point-of-care urine-3+ protein, 1+ bilirubin, 3+ leukocytes  Imaging: None  Interventions: 1 unit of PRBCs, Rocephin 2 g, admission for further management    ED Course (From ED Provider):  Diagnoses as of 10/08/24 1811   Failure of outpatient treatment   Complicated urinary tract infection   Generalized weakness   Anemia due to chronic kidney disease, on chronic dialysis (Multi)     Relevant Results  Results for orders placed or performed during the hospital encounter of  10/08/24 (from the past 24 hour(s))   CBC and Auto Differential   Result Value Ref Range    WBC 4.6 4.4 - 11.3 x10*3/uL    nRBC 0.0 0.0 - 0.0 /100 WBCs    RBC 2.07 (L) 4.00 - 5.20 x10*6/uL    Hemoglobin 6.6 (L) 12.0 - 16.0 g/dL    Hematocrit 19.6 (L) 36.0 - 46.0 %    MCV 95 80 - 100 fL    MCH 31.9 26.0 - 34.0 pg    MCHC 33.7 32.0 - 36.0 g/dL    RDW 20.8 (H) 11.5 - 14.5 %    Platelets 232 150 - 450 x10*3/uL    Neutrophils % 59.8 40.0 - 80.0 %    Immature Granulocytes %, Automated 0.4 0.0 - 0.9 %    Lymphocytes % 25.7 13.0 - 44.0 %    Monocytes % 9.9 2.0 - 10.0 %    Eosinophils % 3.5 0.0 - 6.0 %    Basophils % 0.7 0.0 - 2.0 %    Neutrophils Absolute 2.72 1.60 - 5.50 x10*3/uL    Immature Granulocytes Absolute, Automated 0.02 0.00 - 0.50 x10*3/uL    Lymphocytes Absolute 1.17 0.80 - 3.00 x10*3/uL    Monocytes Absolute 0.45 0.05 - 0.80 x10*3/uL    Eosinophils Absolute 0.16 0.00 - 0.40 x10*3/uL    Basophils Absolute 0.03 0.00 - 0.10 x10*3/uL   Comprehensive metabolic panel   Result Value Ref Range    Glucose 136 (H) 74 - 99 mg/dL    Sodium 138 136 - 145 mmol/L    Potassium 3.4 (L) 3.5 - 5.3 mmol/L    Chloride 99 98 - 107 mmol/L    Bicarbonate 32 21 - 32 mmol/L    Anion Gap 10 10 - 20 mmol/L    Urea Nitrogen 21 6 - 23 mg/dL    Creatinine 2.04 (H) 0.50 - 1.05 mg/dL    eGFR 24 (L) >60 mL/min/1.73m*2    Calcium 9.3 8.6 - 10.3 mg/dL    Albumin 4.1 3.4 - 5.0 g/dL    Alkaline Phosphatase 185 (H) 33 - 136 U/L    Total Protein 6.4 6.4 - 8.2 g/dL    AST 27 9 - 39 U/L    Bilirubin, Total 0.9 0.0 - 1.2 mg/dL    ALT 36 7 - 45 U/L   Lactate   Result Value Ref Range    Lactate 0.9 0.4 - 2.0 mmol/L   Type And Screen   Result Value Ref Range    ABO TYPE A     Rh TYPE POS     ANTIBODY SCREEN NEG    Morphology   Result Value Ref Range    RBC Morphology See Below     Polychromasia Mild     Ovalocytes Few     Teardrop Cells Few    Sars-CoV-2 PCR   Result Value Ref Range    Coronavirus 2019, PCR Not Detected Not Detected   Prepare RBC: 1 Units    Result Value Ref Range    PRODUCT CODE D1370X30     Unit Number [id number]     Unit ABO A     Unit RH POS     XM INTEP COMP     Dispense Status RE     Blood Expiration Date 10/23/2024 11:59:00 PM EDT     PRODUCT BLOOD TYPE 6200     UNIT VOLUME 350    Prepare RBC   Result Value Ref Range    PRODUCT CODE B2521T71     Unit Number [id number]     Unit ABO A     Unit RH NEG     XM INTEP COMP     Dispense Status IS     Blood Expiration Date 10/24/2024 11:59:00 PM EDT     PRODUCT BLOOD TYPE 0600     UNIT VOLUME 281      *Note: Due to a large number of results and/or encounters for the requested time period, some results have not been displayed. A complete set of results can be found in Results Review.      No results found.   Scheduled medications:  [START ON 10/9/2024] cefTRIAXone, 1 g, intravenous, q24h  heparin (porcine), 5,000 Units, subcutaneous, q8h JOSE DE JESUS  [START ON 10/9/2024] heparin, 2,000 Units, intra-catheter, After Dialysis  [START ON 10/9/2024] heparin, 2,000 Units, intra-catheter, After Dialysis  insulin lispro, 0-10 Units, subcutaneous, Before meals & nightly  [START ON 10/9/2024] pantoprazole, 40 mg, oral, Daily   Or  [START ON 10/9/2024] pantoprazole, 40 mg, intravenous, Daily      Continuous medications:     PRN medications:  PRN medications: dextrose, dextrose, glucagon, glucagon, guaiFENesin, hydrALAZINE, melatonin, ondansetron, polyethylene glycol     Past Medical History  She has a past medical history of Abnormal levels of other serum enzymes, Acute kidney failure, Anemia, CKD (chronic kidney disease), COPD (chronic obstructive pulmonary disease) (Multi), Coronary artery disease, Disease of blood and blood-forming organs, unspecified, HLD (hyperlipidemia), Hypertension, MDS (myelodysplastic syndrome) (Multi), Personal history of other diseases of the musculoskeletal system and connective tissue, Personal history of other specified conditions, Personal history of other specified conditions,  and Type 2 diabetes mellitus.    She has no past medical history of Adverse effect of anesthesia, Arthritis, Asthma, Awareness under anesthesia, CHF (congestive heart failure), Delayed emergence from general anesthesia, Disease of thyroid gland, Hard to intubate, History of transfusion, Malignant hyperthermia, PONV (postoperative nausea and vomiting), Pseudocholinesterase deficiency, Sickle cell anemia (Multi), Spinal headache, or Stroke (Multi).    Surgical History  She has a past surgical history that includes Other surgical history (12/13/2019); Other surgical history (12/13/2019); Other surgical history (Bilateral, 12/13/2019); Other surgical history (Left, 12/13/2019); Other surgical history (12/13/2019); Other surgical history (12/13/2019); Other surgical history (Right, 12/13/2019); Other surgical history (04/16/2021); MR angio head wo IV contrast (11/20/2020); MR angio neck wo IV contrast (11/20/2020); Breast biopsy (Left); Hysterectomy; Breast lumpectomy; Appendectomy; Colonoscopy; Oophorectomy; Joint replacement; and Cholecystectomy (06/06/2024).     Social History  She reports that she has never smoked. She has never been exposed to tobacco smoke. She has never used smokeless tobacco. She reports that she does not currently use alcohol. She reports that she does not use drugs.    Family History  Family History   Problem Relation Name Age of Onset    Heart disease Mother      Heart attack Father      Hodgkin's lymphoma Son         Allergies  Codeine, Hydrocodone, Hydrocodone-acetaminophen, Tramadol, Piperacillin-tazobactam, Adhesive tape-silicones, and Meperidine    Code Status  Full Code     Review of Systems   Constitutional:  Positive for fatigue. Negative for chills and fever.   Respiratory:  Negative for cough, chest tightness, shortness of breath and wheezing.    Cardiovascular:  Negative for chest pain, palpitations and leg swelling.   Gastrointestinal:  Positive for nausea and vomiting. Negative  for abdominal pain, constipation and diarrhea.   Genitourinary:  Negative for flank pain and hematuria.   Musculoskeletal:  Negative for back pain and joint swelling.   Skin:  Negative for rash and wound.   Neurological:  Positive for weakness. Negative for tremors, syncope and headaches.       Last Recorded Vitals  /66   Pulse 77   Temp 36.5 °C (97.7 °F) (Temporal)   Resp 20   Wt 65.8 kg (145 lb)   SpO2 100%      Physical Exam:  Vital signs and nursing notes reviewed.   Constitutional: Pale. Pleasant and cooperative. Laying in bed in no acute distress. Conversant.   Skin: Warm and dry; no obvious lesions, rashes, pallor, or jaundice. Good turgor.   Eyes: EOMI. Anicteric sclera.   ENT: Mucous membranes moist; no obvious injury or deformity appreciated.   Head and Neck: Normocephalic, atraumatic. ROM preserved. Trachea midline. No appreciable JVD.   Respiratory: Nonlabored on 2L. Lungs clear to auscultation bilaterally without obvious adventitious sounds. Chest rise is equal.  Cardiovascular: RRR. No gross murmur, gallop, or rub. Extremities are warm and well-perfused with good capillary refill (< 3 seconds). No chest wall tenderness.   GI: Abdomen soft, nontender, nondistended. No obvious organomegaly appreciated. Bowel sounds are present and normoactive.  : No CVA tenderness.   MSK: No gross abnormalities appreciated. No limitations to AROM/PROM appreciated.   Extremities: No cyanosis, edema, or clubbing evident. Neurovascularly intact.   Neuro: A&Ox3. CN 2-12 grossly intact. Able to respond to questions appropriately and clearly. No acute focal neurologic deficits appreciated.  Psych: Appropriate mood and behavior.    Assessment/Plan     79 y.o. female with PMHx s/f ESRD on HD (MWF), hypertension, hyperlipidemia, CAD, HFpEF, COPD (no baseline O2), NIDDM2, paroxysmal atrial fibrillation not on anticoagulation secondary to MDS/anemia, chronic anemia of ESRD and myelodysplastic syndrome requiring MARILYNN and  intermittent PRBC infusions, osteoarthritis, anxiety, depression, GERD presenting with generalized weakness x 3 weeks.     Acute on chronic anemia of ESRD  -Hgb 6.6 at admission, received 1 PRBCs in ED  -denies acute blood loss, melena, hematochezia, hemoptysis  -Recheck in a.m., continue to replenish as necessary     Hypokalemia  -Potassium 3.4 admission, potassium chloride 20 mEq oral ordered  -Recheck in a.m. and replenish as necessary     Complicated UTI, failed outpatient treatment  -Urine Cx on 10/02/24 shows resistant to ciprofloxacin  -rocephin  -urine culture pending  -does not meet sepsis criteria at admission    Generalized weakness  Ambulatory dysfunction  -likely 2/2 UTI with underlying chronic anemia  -PT/OT appreciated     ESRD on HD (MWF schedule)  -Nephrology consult     HTN, HLD  -pt denies taking any hypertensive meds  -ordered coreg and losartan that she should be taking at home, more education for compliance to keep her BP controlled is recommended  -not currently on any lipid lowering medication    NIDDM-II   -Hold home oral meds for now   -Continue with SSI ACHS   -Accucheks, hypoglycemic protocol   -Monitor and adjust as needed      Paroxysmal A-fib  -Continue on Coreg for rate control  -not on anticoagulation secondary to MDS/anemia     Medication noncompliance  -increase adverse risks of future cardiovascular events  -educated on taking medications as directed    Diet: renal  DVT Prophylaxis: heparin  Code Status: full code     NADINE Hernandez dictation software was used to dictate this note and thus there may be minor errors in translation/transcription including garbled speech or misspellings. Please contact for clarification if needed.

## 2024-10-09 NOTE — PRE-PROCEDURE NOTE
Report from Sending RN:    Report From: Kalpana CAROLINA   Recent Surgery of Procedure: No  Baseline Level of Consciousness (LOC): x3  Oxygen Use: No  Type: na  Diabetic: Yes  Last BP Med Given Day of Dialysis: see emar   Last Pain Med Given: see emar   Lab Tests to be Obtained with Dialysis: No  Blood Transfusion to be Given During Dialysis: Yes  Available IV Access: Yes  Medications to be Administered During Dialysis: No  Continuous IV Infusion Running: No  Restraints on Currently or in the Last 24 Hours: No  Hand-Off Communication: stable for hd needs 2 units prbc   Dialysis Catheter Dressing: clean dry intact  Last Dressing Change: needs changed today

## 2024-10-09 NOTE — PROGRESS NOTES
Tana Hargrove is a 79 y.o. female on day 0 of admission presenting with Complicated urinary tract infection.      Subjective   79 y.o. female with PMHx s/f ESRD on HD (MWF), hypertension, hyperlipidemia, CAD, HFpEF, COPD (no baseline O2), NIDDM2, paroxysmal atrial fibrillation not on anticoagulation secondary to MDS/anemia, chronic anemia of ESRD and myelodysplastic syndrome requiring MARILYNN and intermittent PRBC infusions, osteoarthritis, anxiety, depression, GERD presenting with generalized weakness x 3 weeks. She was hospitalized here on 09/20/24-09/23/24 for generalized weakness, UTI and discharged on Levaquin 50 mg. She finished her course of antibiotics, but still feeling generalized weakness. She went to dialysis on Monday, did 1.5 hours of treatment, did not finish it due to nausea and vomiting. She had decreased appetite since before the previous hospitalization and states it hasn't improved much. Her PCP had another UA done today and told her to come to ED for failed outpatient antibiotic treatment. She denies f/c, lightheadedness, headache, chest pain, sob, abd pain, leg swelling. Patient denies taking any other prescribed medications apart from antibiotics.     ED Course (Summary):   Vitals on presentation: 98.1F, 77 bpm, 20 RR, 199/72, 98% on RA  Labs: CMP-glucose 136, potassium 3.4, BUN/creatinine 21/2.04, alkaline phosphatase 185  Lactate 0.9  CBC-hemoglobin 6.6  Point-of-care urine-3+ protein, 1+ bilirubin, 3+ leukocytes  Imaging: None  Interventions: 1 unit of PRBCs, Rocephin 2 g      10/9:                Today patient seen following dialysis and is awake alert and interactive appropriately.  Voices no specific complaints and no acute events overnight.  Just complains of weakness and fatigue.  Hemoglobin 6.0 today after 6.6 yesterday and given 2 more units packed RBC.  No evidence of active bleeding and she reports only having brown stools.  Urine cultures and blood culture remain pending.      Review  of Systems   Constitutional:  Positive for fatigue. Negative for chills and fever.   Respiratory:  Negative for cough, chest tightness, shortness of breath and wheezing.    Cardiovascular:  Negative for chest pain, palpitations and leg swelling.   Gastrointestinal: Negative for nausea and vomiting. Negative for abdominal pain, constipation and diarrhea.  Has only seen brown stool  Genitourinary:  Negative for flank pain and hematuria.   Musculoskeletal:  Negative for back pain and joint swelling.   Skin:  Negative for rash and wound.   Neurological:  Positive for weakness. Negative for tremors, syncope and headaches.          Objective     Last Recorded Vitals  BP (!) 186/71 (BP Location: Left arm, Patient Position: Lying) Comment: RN notified.  Pulse 64   Temp 36.8 °C (98.2 °F) (Temporal)   Resp 18   Wt 66.9 kg (147 lb 7.8 oz)   SpO2 92%   Intake/Output last 3 Shifts:    Intake/Output Summary (Last 24 hours) at 10/9/2024 1737  Last data filed at 10/9/2024 1710  Gross per 24 hour   Intake 2317.92 ml   Output 1550 ml   Net 767.92 ml       Admission Weight  Weight: 65.8 kg (145 lb) (10/08/24 1448)    Daily Weight  10/09/24 : 66.9 kg (147 lb 7.8 oz)    Image Results  ECG 12 lead  Sinus rhythm  Borderline prolonged QT interval      Physical Exam  Constitutional: Pale. Pleasant and cooperative. Laying in bed in no acute distress. Conversant.   Skin: Warm and dry; no obvious lesions, rashes, pallor, or jaundice. Good turgor.   Eyes: EOMI. Anicteric sclera.   ENT: Mucous membranes moist; no obvious injury or deformity appreciated.   Head and Neck: Normocephalic, atraumatic. ROM preserved. Trachea midline. No appreciable JVD.   Respiratory: Nonlabored on 2L. Lungs clear to auscultation bilaterally without obvious adventitious sounds. Chest rise is equal.  Cardiovascular: RRR. No gross murmur, gallop, or rub. Extremities are warm and well-perfused with good capillary refill (< 3 seconds). No chest wall tenderness.   GI:  Abdomen soft, nontender, nondistended. No obvious organomegaly appreciated. Bowel sounds are present and normoactive.  : No CVA tenderness.   MSK: No gross abnormalities appreciated. No limitations to AROM/PROM appreciated.   Extremities: No cyanosis, edema, or clubbing evident. Neurovascularly intact.   Neuro: A&Ox3. CN 2-12 grossly intact. Able to respond to questions appropriately and clearly. No acute focal neurologic deficits appreciated.  Psych: Appropriate mood and behavior.      Relevant Results               Assessment/Plan        This patient has a central line   Reason for the central line remaining today? Dialysis/Hemapheresis            Assessment & Plan  Complicated urinary tract infection    Acute on chronic anemia of ESRD  -Hgb 6.6 at admission, received 1 PRBCs in ED  -denies acute blood loss, melena, hematochezia, hemoptysis  -Recheck in a.m., continue to replenish as necessary  10/9: Hemoglobin 6.0 today and received 2 more units packed RBC without any obvious evidence of active bleeding.  Continue to monitor.  Hemodynamically stable.     Hypokalemia  -Potassium 3.4 admission, potassium chloride 20 mEq oral ordered  -Recheck in a.m. and replenish as necessary     Complicated UTI, failed outpatient treatment  -Urine Cx on 10/02/24 shows resistant to ciprofloxacin  -rocephin  -urine culture pending  -does not meet sepsis criteria at admission  10/9: Urine culture pending.     Generalized weakness  Ambulatory dysfunction  -likely 2/2 UTI with underlying chronic anemia  -PT/OT appreciated     ESRD on HD (MWF schedule)  -Nephrology consult  10/9: Nephrology continuing Monday Wednesday Friday schedule.     HTN, HLD  -pt denies taking any hypertensive meds  -ordered coreg and losartan that she should be taking at home, more education for compliance to keep her BP controlled is recommended  -not currently on any lipid lowering medication     NIDDM-II   -Hold home oral meds for now   -Continue with SSI  ACHS   -Accucheks, hypoglycemic protocol   -Monitor and adjust as needed      Paroxysmal A-fib  -Continue on Coreg for rate control  -not on anticoagulation secondary to MDS/anemia      Medication noncompliance  -increase adverse risks of future cardiovascular events  -educated on taking medications as directed                  Abhi Espana MD

## 2024-10-09 NOTE — PROGRESS NOTES
10/09/24 1435   Discharge Planning   Living Arrangements Alone   Support Systems Children;Family members   Assistance Needed Home Health Care   Type of Residence Private residence   Home or Post Acute Services In home services   Type of Home Care Services Home nursing visits;Home OT;Home PT   Expected Discharge Disposition Home H   Does the patient need discharge transport arranged? No     Met with patient at bedside, introduced self and role as RN TCC. Patient lives at home alone, family nearby for support. Patient independent in her own care. ESRD on HD MWF. She is admitted for complicated UTI. She prefers to discharge home once medically ready, denies SNF placement. She is open to Home Care and would like [facility]. PCP Carlos Higgins, manages her own medications. Will have MD place orders on discharge. TCC to follow for additional needs if they arise.

## 2024-10-09 NOTE — PROGRESS NOTES
Physical Therapy                 Therapy Communication Note    Patient Name: Tana Hargrove  MRN: [id number]  Department: [facility] DIALYSIS  Room: [facility]-A  Today's Date: 10/9/2024     Discipline: Physical Therapy    Missed Visit Reason: Patient in a medical procedure (dialysis, HGB 6.0)    Missed Time: Attempt    Comment: ZOHAIB Jorgensen

## 2024-10-09 NOTE — CONSULTS
Nephrology Consult Note                                                                                                                                         Inpatient consult to Renal Care  Consult performed by: Pinky Christie DO  Consult ordered by: Jess Dodd PA-C                                                                                                               HPI  Patient is a 79 y.o. female history of ESRD, HTN, HLD, CAD, mild dysplastic syndrome, anemia who is admitted to hospital with complaints of   generalized fatigue. Nephrology consulted in view of ESRD.   Patient is known to me.  Recent hospitalization for generalized weakness and she was diagnosed with UTI and discharged on Levaquin.  Patient has not felt much better since hospitalization and has had some nausea and emesis as well.  A urine culture done on 10/2/2024 did show ,000 and E. coli.  She is not improved on outpatient antibiotic treatment and was instructed to go to the emergency department.  I arranged for dialysis and saw her on dialysis today.  She did states she is hungry after dialysis.  Her last treatment was cut short due to nausea and vomiting on 10/7/2024.  Patient also found to have a hemoglobin of 6.6 and did get PRBCs overnight.  There        Past Medical History:   Diagnosis Date    Abnormal levels of other serum enzymes     Elevated liver enzymes    Acute kidney failure     Anemia     CKD (chronic kidney disease)     COPD (chronic obstructive pulmonary disease) (Multi)     Coronary artery disease     Disease of blood and blood-forming organs, unspecified     Bone marrow disorder    HLD (hyperlipidemia)     Hypertension     MDS (myelodysplastic syndrome) (Multi)     Personal history of other diseases of the musculoskeletal system and connective tissue     History of muscle pain     Personal history of other specified conditions     History of insomnia    Personal history of other specified conditions     History of edema    Type 2 diabetes mellitus       Social History     Socioeconomic History    Marital status:      Spouse name: Not on file    Number of children: Not on file    Years of education: Not on file    Highest education level: Not on file   Occupational History    Not on file   Tobacco Use    Smoking status: Never     Passive exposure: Never    Smokeless tobacco: Never   Vaping Use    Vaping status: Never Used   Substance and Sexual Activity    Alcohol use: Not Currently    Drug use: Never    Sexual activity: Not Currently   Other Topics Concern    Not on file   Social History Narrative    Not on file     Social Determinants of Health     Financial Resource Strain: Low Risk  (10/9/2024)    Overall Financial Resource Strain (CARDIA)     Difficulty of Paying Living Expenses: Not hard at all   Food Insecurity: No Food Insecurity (10/9/2024)    Hunger Vital Sign     Worried About Running Out of Food in the Last Year: Never true     Ran Out of Food in the Last Year: Never true   Transportation Needs: No Transportation Needs (10/9/2024)    PRAPARE - Transportation     Lack of Transportation (Medical): No     Lack of Transportation (Non-Medical): No   Physical Activity: Inactive (10/9/2024)    Exercise Vital Sign     Days of Exercise per Week: 0 days     Minutes of Exercise per Session: 0 min   Stress: No Stress Concern Present (10/9/2024)    [employer] - Occupational Stress Questionnaire     Feeling of Stress : Only a little   Social Connections: Moderately Integrated (10/9/2024)    Social Connection and Isolation Panel [NHANES]     Frequency of Communication with Friends and Family: More than three times a week     Frequency of Social Gatherings with Friends and Family: Twice a week     Attends Faith Services: More than 4 times per year      Active Member of Clubs or Organizations: Yes     Attends Club or Organization Meetings: Never     Marital Status:    Intimate Partner Violence: Not At Risk (10/9/2024)    Humiliation, Afraid, Rape, and Kick questionnaire     Fear of Current or Ex-Partner: No     Emotionally Abused: No     Physically Abused: No     Sexually Abused: No   Housing Stability: Low Risk  (10/9/2024)    Housing Stability Vital Sign     Unable to Pay for Housing in the Last Year: No     Number of Times Moved in the Last Year: 0     Homeless in the Last Year: No      Family History   Problem Relation Name Age of Onset    Heart disease Mother      Heart attack Father      Hodgkin's lymphoma Son        No current facility-administered medications on file prior to encounter.     Current Outpatient Medications on File Prior to Encounter   Medication Sig Dispense Refill    allopurinol (Zyloprim) 100 mg tablet Take 1 tablet (100 mg) by mouth every 12 hours.      apixaban (Eliquis) 2.5 mg tablet Take 1 tablet (2.5 mg) by mouth every 12 hours. 60 tablet 0    atorvastatin (Lipitor) 10 mg tablet Take 1 tablet (10 mg) by mouth once daily. 30 tablet 2    carvedilol (Coreg) 25 mg tablet Take 1 tablet (25 mg) by mouth 2 times a day. 60 tablet 1    cholecalciferol (Vitamin D-3) 50 MCG (2000 UT) tablet Take 1 tablet (2,000 Units) by mouth once daily.      colchicine 0.6 mg tablet TAKE 1 TABLET BY MOUTH ONCE DAILY AS NEEDED FOR GOUT FLARE      cyanocobalamin (Vitamin B-12) 1,000 mcg tablet Take 1 tablet (1,000 mcg) by mouth once daily.      docusate sodium (Colace) 100 mg capsule Take 1 capsule (100 mg) by mouth 2 times a day.      losartan (Cozaar) 50 mg tablet Take 1 tablet (50 mg) by mouth once daily. (Patient not taking: Reported on 10/8/2024) 30 tablet 2    pioglitazone (Actos) 15 mg tablet Take 1 tablet (15 mg) by mouth once daily. 30 tablet 3    sevelamer carbonate (Renvela) 800 mg tablet Take by mouth.      SITagliptin 25 mg tablet Take by  "mouth.        Scheduled medications  carvedilol, 25 mg, oral, BID  cefTRIAXone, 1 g, intravenous, q24h  [Held by provider] heparin (porcine), 5,000 Units, subcutaneous, q8h JOSE DE JESUS  heparin, 2,000 Units, intra-catheter, After Dialysis  heparin, 2,000 Units, intra-catheter, After Dialysis  insulin lispro, 0-10 Units, subcutaneous, Before meals & nightly  losartan, 50 mg, oral, Daily  pantoprazole, 40 mg, oral, Daily   Or  pantoprazole, 40 mg, intravenous, Daily      Continuous medications     PRN medications  PRN medications: dextrose, dextrose, glucagon, glucagon, guaiFENesin, hydrALAZINE, melatonin, ondansetron, polyethylene glycol     Review of systems  as per HPI otherwise 10 point review systems negative    BP (!) 200/80   Pulse 73   Temp 36.6 °C (97.9 °F) (Temporal)   Resp 18   Ht 1.549 m (5' 0.98\")   Wt 66.9 kg (147 lb 7.8 oz)   SpO2 94%   BMI 27.88 kg/m²     Input / Output:  24 HR:   Intake/Output Summary (Last 24 hours) at 10/9/2024 1258  Last data filed at 10/9/2024 1150  Gross per 24 hour   Intake 1627.92 ml   Output 1400 ml   Net 227.92 ml       Physical Exam   Alert and oriented x 3, NAD  EOMI  OP clear  Neck: supple, No JVD, RIJ TDC  CV: RRR without m/r/g  Lungs: CTA bilaterally  Abd: soft NT/ND +BS  Ext: no lower extremity edema   Neuro: grossly intact  Skin: no rashes    Results from last 7 days   Lab Units 10/09/24  0510 10/08/24  1629   SODIUM mmol/L 139 138   POTASSIUM mmol/L 3.6 3.4*   CHLORIDE mmol/L 101 99   CO2 mmol/L 30 32   BUN mg/dL 23 21   CREATININE mg/dL 2.06* 2.04*   GLUCOSE mg/dL 106* 136*   CALCIUM mg/dL 8.9 9.3        Results from last 7 days   Lab Units 10/09/24  0510   SODIUM mmol/L 139   POTASSIUM mmol/L 3.6   CHLORIDE mmol/L 101   CO2 mmol/L 30   BUN mg/dL 23   CREATININE mg/dL 2.06*   CALCIUM mg/dL 8.9   PROTEIN TOTAL g/dL 5.2*   BILIRUBIN TOTAL mg/dL 0.7   ALK PHOS U/L 129   ALT U/L 24   AST U/L 17   GLUCOSE mg/dL 106*           Results from last 7 days   Lab Units " 10/09/24  0510 10/08/24  1629   WBC AUTO x10*3/uL 3.2* 4.6   HEMOGLOBIN g/dL 6.0* 6.6*   HEMATOCRIT % 17.9* 19.6*   PLATELETS AUTO x10*3/uL 168 232        No orders to display        Assessment:     Patient is 79 y.o. female hypertension CAD, COPD, HFpEF, DM 2 who is admitted to hospital for generalized weakness and complicated UTI. Nephrology consulted in view of ESRD.    ESRD  -HD MWF at [facility]  -Access is right IJ TDC    Anemia of ESRD but also has myelodysplastic syndrome   -She is on Epogen 3 times a week at high doses  -Receives transfusions for hemoglobin less than 7 as needed  -Status post PRBC this admission    CKD-MBD  -Goal phosphorus less than 5.5.    Complicated UTI    Recommendations:   -HD today  -Will keep on Monday Wednesday Friday schedule while she is here  -UF goal 1 L and she tolerated well  -Dose Epogen Monday Wednesday Friday      Please message me through [employer] chat with any questions or concerns.     Pinky Christie DO  10/9/2024  12:58 PM         [facility]    [address], [address]   [place] [address]  Office: [phone]

## 2024-10-09 NOTE — CARE PLAN
Problem: Fall/Injury  Goal: Not fall by end of shift  Outcome: Progressing  Goal: Be free from injury by end of the shift  Outcome: Progressing  Goal: Verbalize understanding of personal risk factors for fall in the hospital  Outcome: Progressing  Goal: Verbalize understanding of risk factor reduction measures to prevent injury from fall in the home  Outcome: Progressing  Goal: Use assistive devices by end of the shift  Outcome: Progressing  Goal: Pace activities to prevent fatigue by end of the shift  Outcome: Progressing   The patient's goals for the shift include      The clinical goals for the shift include monitor hemodynamic stability

## 2024-10-09 NOTE — PROGRESS NOTES
Occupational Therapy                 Therapy Communication Note    Patient Name: Tana Hargrove  MRN: [id number]  Department: [facility] DIALYSIS  Room: [facility]-A  Today's Date: 10/9/2024     Discipline: Occupational Therapy    Missed Visit Reason: Missed Visit Reason: Patient in a medical procedure (OT eval attempted. pt currently in dialysis)    Missed Time: Attempt    Comment:

## 2024-10-09 NOTE — CONSULTS
Nutrition Initial Assessment:   Nutrition Assessment    Reason for Assessment: Admission nursing screening    Medical history per chart: ESRD on HD (MWF), hypertension, hyperlipidemia, CAD, HFpEF, COPD (no baseline O2), NIDDM2, paroxysmal atrial fibrillation not on anticoagulation secondary to MDS/anemia, chronic anemia of ESRD and myelodysplastic syndrome requiring MARILYNN and intermittent PRBC infusions, osteoarthritis, anxiety, depression, GERD     Admitted for complicated UTI, generalized weakness, failure of outpatient treatment, anemia d/t CKD on chronic dialysis    10/9: Pt in dialysis, reported good appetite today, weight loss as below, receptive to ONS once daily. Will send Nepro once daily. Pt admission Dx, PMH, labs, medications, allergies, diet orders, skin integrity reviewed. RD to follow per POC, protocol.    Nutrition History:  Energy Intake: Poor < 50 %  Food and Nutrient History: Poor appetite, intake prior to 9/20/24 per H&P.  Vitamin/Herbal Supplement Use: Vitamin D-3, Vitamin B-12 per Home Meds list.     Current Diet: Adult diet Renal; Potassium Restricted 2 gm (50mEq); 2 - 3 grams Sodium  Average meal Intake during admission:  not currently recorded      Nutrition Related Findings:   Oral Symptoms: Teeth: Dentures upper, Dentures lower   Food allergies: NKFA. is allergic to codeine, hydrocodone, hydrocodone-acetaminophen, tramadol, piperacillin-tazobactam, adhesive tape-silicones, and meperidine.  Meds/Labs reviewed.  carvedilol, 25 mg, oral, BID  cefTRIAXone, 1 g, intravenous, q24h  epoetin alejandra or biosimilar, 20,000 Units, subcutaneous, Once per day on Monday Wednesday Friday  [Held by provider] heparin (porcine), 5,000 Units, subcutaneous, q8h JOSE DE JESUS  heparin, 2,000 Units, intra-catheter, After Dialysis  heparin, 2,000 Units, intra-catheter, After Dialysis  insulin lispro, 0-10 Units, subcutaneous, Before meals & nightly  losartan, 50 mg, oral, Daily  pantoprazole, 40 mg, oral, Daily    "Or  pantoprazole, 40 mg, intravenous, Daily         Nutrition Significant Labs:    Results from last 7 days   Lab Units 10/09/24  0510 10/08/24  1629   GLUCOSE mg/dL 106* 136*   SODIUM mmol/L 139 138   POTASSIUM mmol/L 3.6 3.4*   CHLORIDE mmol/L 101 99   CO2 mmol/L 30 32   BUN mg/dL 23 21   CREATININE mg/dL 2.06* 2.04*   EGFR mL/min/1.73m*2 24* 24*   CALCIUM mg/dL 8.9 9.3     Lab Results   Component Value Date    HGBA1C 6.3 (H) 03/14/2024    HGBA1C 6.6 (H) 02/26/2024     Results from last 7 days   Lab Units 10/09/24  1246 10/09/24  0627   POCT GLUCOSE mg/dL 105* 103*     Anthropometrics:  Height: 154.9 cm (5' 0.98\")   Weight: 66.9 kg (147 lb 7.8 oz)   BMI (Calculated): 27.88  IBW/kg (Dietitian Calculated): 47.7 kg        Weight History:   Wt Readings from Last 10 Encounters:   10/09/24 66.9 kg (147 lb 7.8 oz)   10/01/24 64.1 kg (141 lb 4 oz)   09/20/24 64.3 kg (141 lb 12.1 oz)   09/18/24 66.7 kg (147 lb)   09/16/24 74.8 kg (165 lb)   08/23/24 67.1 kg (148 lb)   07/29/24 71.2 kg (157 lb)   07/01/24 72.6 kg (160 lb)   06/19/24 72.6 kg (160 lb)   06/13/24 64 kg (141 lb 1.5 oz)      Weight Change %:  Weight History / % Weight Change: Weight loss noted on MST. Pt reported weight loss of 20 pounds d/t decreased appetite over the past year. Chart review showed 10.5% loss from 74.8kg (9/16/24). Variable weights in history, pt receiving dialysis noted.  Significant Weight Loss: Yes  Interpretation of Weight Loss: >5% in 1 month     Nutrition Focused Physical Exam Findings:  defer: Pt in dialysis, covered with blanket and wearing a mask  Subcutaneous Fat Loss:      Muscle Wasting:     Edema:  Edema: none (per Flowsheet)  Physical Findings:  Skin: Positive (diabetic foot ulcer on L and R feet per Flowsheet)    Estimated Needs:   Total Energy Estimated Needs (kCal):  (0595-9026)  Method for Estimating Needs: based on actual body weight  Total Protein Estimated Needs (g): 80 g  Method for Estimating Needs: based on actual body " weight  Total Fluid Estimated Needs (mL):   Method for Estimating Needs: per physician      Nutrition Diagnosis   Nutrition Diagnosis:  Malnutrition Diagnosis  Patient has Malnutrition Diagnosis: Yes  Diagnosis Status: New  Malnutrition Diagnosis: Moderate malnutrition related to acute disease or injury  As Evidenced by: Reported <75% of estimated energy requirements since before 9/20/24, >5% weight loss x 1 month        Nutrition Interventions/Recommendations   Nutrition Interventions and Recommendations:        Nutrition Prescription:  Individualized Nutrition Prescription Provided for : diet        Nutrition Interventions:   Food and/or Nutrient Delivery Interventions  Interventions: Meals and snacks, Medical food supplement  Meals and Snacks: Mineral-modified diet  Goal: consume >50% of meals  Medical Food Supplement: Commercial beverage  Goal: consume ONS once daily    Coordination of Nutrition Care by a Nutrition Professional  Collaboration and Referral of Nutrition Care: Collaboration by nutrition professional with other providers  Goal: discussed with GE Acuna    Nutrition Education:   Education Documentation  No documentation found.    Nutrition Counseling  Counseling Theoretical Approach: Other (Comment) (pt in dialysis)     Nutrition Monitoring and Evaluation   Monitoring/Evaluation:   Food/Nutrient Related History Monitoring  Monitoring and Evaluation Plan: Amount of food  Amount of Food: Estimated amout of food  Criteria: consume >50% of meals    Body Composition/Growth/Weight History  Monitoring and Evaluation Plan: Weight change  Weight Change: Weight change percentage  Criteria: maintain stable weight    Biochemical Data, Medical Tests and Procedures  Monitoring and Evaluation Plan: Electrolyte/renal panel, Glucose/endocrine profile  Electrolyte and Renal Panel: BUN, Creatinine, Magnesium, Phosphorus, Potassium, Sodium  Criteria: WDL  Glucose/Endocrine Profile: Glucose, casual, Hemoglobin A1c  (HgbA1c)  Criteria: WDL    Nutrition Focused Physical Findings  Monitoring and Evaluation Plan: Adipose, Muscles, Skin  Adipose: Other (Comment) (monitor for loss)  Criteria: WDL  Muscles: Other (Comment) (monitor for loss)  Criteria: WDL  Skin: Impaired wound healing  Criteria: promote healing        Time Spent/Follow-up Reminder:   Follow Up  Time Spent (min): 45 minutes  Last Date of Nutrition Visit: 10/09/24  Nutrition Follow-Up Needed?: Dietitian to reassess per policy  Follow up Comment: 10/12-10/14

## 2024-10-09 NOTE — POST-PROCEDURE NOTE
Report to Receiving RN:    Report To: Kalpana CAROLINA   Time Report Called: 1200  Hand-Off Communication: 1 liter off did good no issues received 1 unit PRBCs during HD   Complications During Treatment: No  Ultrafiltration Treatment: No  Medications Administered During Dialysis: No  Blood Products Administered During Dialysis: yes  Labs Sent During Dialysis: No  Heparin Drip Rate Changes: No  Dialysis Catheter Dressing: clean dry intact   Last Dressing Change: 10/9/24    Electronic Signatures:  Ashutosh Jacome RN  (Signed )   Authored:    (Signed )   Authored:     Last Updated: 12:03 PM by ASHUTOSH JACOME

## 2024-10-10 LAB
ANION GAP SERPL CALC-SCNC: 10 MMOL/L (ref 10–20)
BACTERIA UR CULT: NORMAL
BACTERIA UR CULT: NORMAL
BUN SERPL-MCNC: 21 MG/DL (ref 6–23)
CALCIUM SERPL-MCNC: 8.9 MG/DL (ref 8.6–10.3)
CHLORIDE SERPL-SCNC: 100 MMOL/L (ref 98–107)
CO2 SERPL-SCNC: 30 MMOL/L (ref 21–32)
CREAT SERPL-MCNC: 1.44 MG/DL (ref 0.5–1.05)
EGFRCR SERPLBLD CKD-EPI 2021: 37 ML/MIN/1.73M*2
ERYTHROCYTE [DISTWIDTH] IN BLOOD BY AUTOMATED COUNT: 17.4 % (ref 11.5–14.5)
GLUCOSE BLD MANUAL STRIP-MCNC: 115 MG/DL (ref 74–99)
GLUCOSE BLD MANUAL STRIP-MCNC: 123 MG/DL (ref 74–99)
GLUCOSE BLD MANUAL STRIP-MCNC: 143 MG/DL (ref 74–99)
GLUCOSE BLD MANUAL STRIP-MCNC: 214 MG/DL (ref 74–99)
GLUCOSE SERPL-MCNC: 112 MG/DL (ref 74–99)
HCT VFR BLD AUTO: 26.4 % (ref 36–46)
HGB BLD-MCNC: 9.2 G/DL (ref 12–16)
MCH RBC QN AUTO: 32.1 PG (ref 26–34)
MCHC RBC AUTO-ENTMCNC: 34.8 G/DL (ref 32–36)
MCV RBC AUTO: 92 FL (ref 80–100)
NRBC BLD-RTO: 0 /100 WBCS (ref 0–0)
PLATELET # BLD AUTO: 168 X10*3/UL (ref 150–450)
POTASSIUM SERPL-SCNC: 3.5 MMOL/L (ref 3.5–5.3)
RBC # BLD AUTO: 2.87 X10*6/UL (ref 4–5.2)
SODIUM SERPL-SCNC: 136 MMOL/L (ref 136–145)
WBC # BLD AUTO: 2.7 X10*3/UL (ref 4.4–11.3)

## 2024-10-10 PROCEDURE — 85027 COMPLETE CBC AUTOMATED: CPT | Performed by: FAMILY MEDICINE

## 2024-10-10 PROCEDURE — 80048 BASIC METABOLIC PNL TOTAL CA: CPT | Performed by: FAMILY MEDICINE

## 2024-10-10 PROCEDURE — 1200000002 HC GENERAL ROOM WITH TELEMETRY DAILY

## 2024-10-10 PROCEDURE — 2500000001 HC RX 250 WO HCPCS SELF ADMINISTERED DRUGS (ALT 637 FOR MEDICARE OP)

## 2024-10-10 PROCEDURE — 99233 SBSQ HOSP IP/OBS HIGH 50: CPT | Performed by: FAMILY MEDICINE

## 2024-10-10 PROCEDURE — 2500000001 HC RX 250 WO HCPCS SELF ADMINISTERED DRUGS (ALT 637 FOR MEDICARE OP): Performed by: INTERNAL MEDICINE

## 2024-10-10 PROCEDURE — 82947 ASSAY GLUCOSE BLOOD QUANT: CPT

## 2024-10-10 PROCEDURE — 2500000002 HC RX 250 W HCPCS SELF ADMINISTERED DRUGS (ALT 637 FOR MEDICARE OP, ALT 636 FOR OP/ED)

## 2024-10-10 PROCEDURE — 36415 COLL VENOUS BLD VENIPUNCTURE: CPT | Performed by: FAMILY MEDICINE

## 2024-10-10 PROCEDURE — 2500000001 HC RX 250 WO HCPCS SELF ADMINISTERED DRUGS (ALT 637 FOR MEDICARE OP): Performed by: FAMILY MEDICINE

## 2024-10-10 PROCEDURE — 2500000004 HC RX 250 GENERAL PHARMACY W/ HCPCS (ALT 636 FOR OP/ED)

## 2024-10-10 RX ORDER — CLONIDINE HYDROCHLORIDE 0.2 MG/1
0.2 TABLET ORAL ONCE
Status: COMPLETED | OUTPATIENT
Start: 2024-10-10 | End: 2024-10-10

## 2024-10-10 ASSESSMENT — COGNITIVE AND FUNCTIONAL STATUS - GENERAL
DAILY ACTIVITIY SCORE: 20
TURNING FROM BACK TO SIDE WHILE IN FLAT BAD: A LITTLE
CLIMB 3 TO 5 STEPS WITH RAILING: A LOT
MOBILITY SCORE: 19
TOILETING: A LITTLE
WALKING IN HOSPITAL ROOM: A LITTLE
DRESSING REGULAR LOWER BODY CLOTHING: A LITTLE
WALKING IN HOSPITAL ROOM: A LITTLE
DAILY ACTIVITIY SCORE: 23
TOILETING: A LITTLE
PERSONAL GROOMING: A LITTLE
HELP NEEDED FOR BATHING: A LITTLE
MOVING TO AND FROM BED TO CHAIR: A LITTLE
STANDING UP FROM CHAIR USING ARMS: A LITTLE
MOVING TO AND FROM BED TO CHAIR: A LITTLE
MOBILITY SCORE: 18
STANDING UP FROM CHAIR USING ARMS: A LITTLE
CLIMB 3 TO 5 STEPS WITH RAILING: A LOT

## 2024-10-10 ASSESSMENT — PAIN SCALES - GENERAL
PAINLEVEL_OUTOF10: 0 - NO PAIN
PAINLEVEL_OUTOF10: 7
PAINLEVEL_OUTOF10: 2
PAINLEVEL_OUTOF10: 3
PAINLEVEL_OUTOF10: 5 - MODERATE PAIN

## 2024-10-10 ASSESSMENT — PAIN DESCRIPTION - LOCATION
LOCATION: HEAD
LOCATION: HEAD

## 2024-10-10 ASSESSMENT — PAIN - FUNCTIONAL ASSESSMENT
PAIN_FUNCTIONAL_ASSESSMENT: 0-10

## 2024-10-10 ASSESSMENT — ACTIVITIES OF DAILY LIVING (ADL): LACK_OF_TRANSPORTATION: NO

## 2024-10-10 NOTE — PROGRESS NOTES
10/10/24 1140   Discharge Planning   Living Arrangements Alone   Support Systems Children;Family members   Assistance Needed [facility]   Type of Residence Private residence   Number of Stairs to Enter Residence 0   Number of Stairs Within Residence 0   Do you have animals or pets at home? No   Who is requesting discharge planning? Provider   Home or Post Acute Services In home services   Type of Home Care Services Home nursing visits;Home OT;Home PT   Expected Discharge Disposition Home H   Does the patient need discharge transport arranged? No   Financial Resource Strain   How hard is it for you to pay for the very basics like food, housing, medical care, and heating? Not hard   Housing Stability   In the last 12 months, was there a time when you were not able to pay the mortgage or rent on time? N   In the past 12 months, how many times have you moved where you were living? 0   At any time in the past 12 months, were you homeless or living in a shelter (including now)? N   Transportation Needs   In the past 12 months, has lack of transportation kept you from medical appointments or from getting medications? no   In the past 12 months, has lack of transportation kept you from meetings, work, or from getting things needed for daily living? No     Pt plans to discharge home with [facility] new. She is from home alone and has ESRD and has HD M-W F. [facility] is 20/18. ADOD is likely this afternoon per care conference. CT to follow. Macy GAMBINON/RN-TCC

## 2024-10-10 NOTE — PROGRESS NOTES
Social work consult placed for positive medical risk screen. SW reviewed pt's chart and communicated with TCC. No SW needs foreseen at this time. SW signing off; available upon request.    GISELLE Magallanes, LSW ([id number])   Care Transitions

## 2024-10-10 NOTE — PROGRESS NOTES
Physical Therapy                 Therapy Communication Note    Patient Name: Tana Hargrove  MRN: [id number]  Department: [facility] 3 E  Room: [address] [place] [address]  Today's Date: 10/10/2024     Discipline: Physical Therapy    Missed Visit Reason: Patient refused (pt declines further mobility wtih PT, just RTB with PCA assist, states she sat in chair today/has been using BSC. PCA present states pt is mobilizing well)    Missed Time: Attempt    Comment:

## 2024-10-10 NOTE — CARE PLAN
Problem: Fall/Injury  Goal: Not fall by end of shift  Outcome: Progressing  Goal: Be free from injury by end of the shift  Outcome: Progressing  Goal: Verbalize understanding of personal risk factors for fall in the hospital  Outcome: Progressing  Goal: Verbalize understanding of risk factor reduction measures to prevent injury from fall in the home  Outcome: Progressing  Goal: Use assistive devices by end of the shift  Outcome: Progressing  Goal: Pace activities to prevent fatigue by end of the shift  Outcome: Progressing     Problem: Skin  Goal: Decreased wound size/increased tissue granulation at next dressing change  Outcome: Progressing  Flowsheets (Taken 10/10/2024 0724)  Decreased wound size/increased tissue granulation at next dressing change: Promote sleep for wound healing  Goal: Participates in plan/prevention/treatment measures  Outcome: Progressing  Flowsheets (Taken 10/10/2024 0724)  Participates in plan/prevention/treatment measures:   Discuss with provider PT/OT consult   Elevate heels   Increase activity/out of bed for meals  Goal: Prevent/manage excess moisture  Outcome: Progressing  Flowsheets (Taken 10/10/2024 0724)  Prevent/manage excess moisture:   Cleanse incontinence/protect with barrier cream   Follow provider orders for dressing changes  Goal: Prevent/minimize sheer/friction injuries  Outcome: Progressing  Flowsheets (Taken 10/10/2024 0724)  Prevent/minimize sheer/friction injuries:   Complete micro-shifts as needed if patient unable. Adjust patient position to relieve pressure points, not a full turn   Increase activity/out of bed for meals  Goal: Promote/optimize nutrition  Outcome: Progressing  Flowsheets (Taken 10/10/2024 0724)  Promote/optimize nutrition:   Consume > 50% meals/supplements   Monitor/record intake including meals  Goal: Promote skin healing  Outcome: Progressing  Flowsheets (Taken 10/10/2024 0724)  Promote skin healing:   Assess skin/pad under line(s)/device(s)    Ensure correct size (line/device) and apply per  instructions     Problem: Pain - Adult  Goal: Verbalizes/displays adequate comfort level or baseline comfort level  Outcome: Progressing     Problem: Safety - Adult  Goal: Free from fall injury  Outcome: Progressing     Problem: Discharge Planning  Goal: Discharge to home or other facility with appropriate resources  Outcome: Progressing     Problem: Chronic Conditions and Co-morbidities  Goal: Patient's chronic conditions and co-morbidity symptoms are monitored and maintained or improved  Outcome: Progressing     Problem: Pain  Goal: Takes deep breaths with improved pain control throughout the shift  Outcome: Progressing  Goal: Turns in bed with improved pain control throughout the shift  Outcome: Progressing  Goal: Walks with improved pain control throughout the shift  Outcome: Progressing  Goal: Performs ADL's with improved pain control throughout shift  Outcome: Progressing  Goal: Participates in PT with improved pain control throughout the shift  Outcome: Progressing  Goal: Free from opioid side effects throughout the shift  Outcome: Progressing  Goal: Free from acute confusion related to pain meds throughout the shift  Outcome: Progressing     Problem: Diabetes  Goal: Achieve decreasing blood glucose levels by end of shift  Outcome: Progressing  Goal: Increase stability of blood glucose readings by end of shift  Outcome: Progressing  Goal: Decrease in ketones present in urine by end of shift  Outcome: Progressing  Goal: Maintain electrolyte levels within acceptable range throughout shift  Outcome: Progressing  Goal: Maintain glucose levels >70mg/dl to <250mg/dl throughout shift  Outcome: Progressing  Goal: No changes in neurological exam by end of shift  Outcome: Progressing  Goal: Learn about and adhere to nutrition recommendations by end of shift  Outcome: Progressing  Goal: Vital signs within normal range for age by end of shift  Outcome:  Progressing  Goal: Increase self care and/or family involovement by end of shift  Outcome: Progressing  Goal: Receive DSME education by end of shift  Outcome: Progressing     Problem: Nutrition  Goal: Oral intake greater than 50%  Outcome: Progressing  Goal: Consume prescribed supplement  Outcome: Progressing  Goal: Adequate PO fluid intake  Outcome: Progressing  Goal: BG  mg/dL  Outcome: Progressing  Goal: Lab values WNL  Outcome: Progressing  Goal: Promote healing  Outcome: Progressing  Goal: Maintain stable weight  Outcome: Progressing    The patient's goals for the shift include      The clinical goals for the shift include Patient will remain free from falls and injury during shift

## 2024-10-10 NOTE — CARE PLAN
The patient's goals for the shift include      The clinical goals for the shift include pt will remain safe and free from falls during this shift      Problem: Fall/Injury  Goal: Not fall by end of shift  Outcome: Progressing  Goal: Be free from injury by end of the shift  Outcome: Progressing  Goal: Verbalize understanding of personal risk factors for fall in the hospital  Outcome: Progressing  Goal: Verbalize understanding of risk factor reduction measures to prevent injury from fall in the home  Outcome: Progressing  Goal: Use assistive devices by end of the shift  Outcome: Progressing  Goal: Pace activities to prevent fatigue by end of the shift  Outcome: Progressing     Problem: Skin  Goal: Decreased wound size/increased tissue granulation at next dressing change  Outcome: Progressing  Goal: Participates in plan/prevention/treatment measures  Outcome: Progressing  Goal: Prevent/manage excess moisture  Outcome: Progressing  Goal: Prevent/minimize sheer/friction injuries  Outcome: Progressing  Goal: Promote/optimize nutrition  Outcome: Progressing  Goal: Promote skin healing  Outcome: Progressing     Problem: Pain - Adult  Goal: Verbalizes/displays adequate comfort level or baseline comfort level  Outcome: Progressing     Problem: Safety - Adult  Goal: Free from fall injury  Outcome: Progressing     Problem: Discharge Planning  Goal: Discharge to home or other facility with appropriate resources  Outcome: Progressing     Problem: Chronic Conditions and Co-morbidities  Goal: Patient's chronic conditions and co-morbidity symptoms are monitored and maintained or improved  Outcome: Progressing     Problem: Pain  Goal: Takes deep breaths with improved pain control throughout the shift  Outcome: Progressing  Goal: Turns in bed with improved pain control throughout the shift  Outcome: Progressing  Goal: Walks with improved pain control throughout the shift  Outcome: Progressing  Goal: Performs ADL's with improved pain  control throughout shift  Outcome: Progressing  Goal: Participates in PT with improved pain control throughout the shift  Outcome: Progressing  Goal: Free from opioid side effects throughout the shift  Outcome: Progressing  Goal: Free from acute confusion related to pain meds throughout the shift  Outcome: Progressing     Problem: Diabetes  Goal: Achieve decreasing blood glucose levels by end of shift  Outcome: Progressing  Goal: Increase stability of blood glucose readings by end of shift  Outcome: Progressing  Goal: Decrease in ketones present in urine by end of shift  Outcome: Progressing  Goal: Maintain electrolyte levels within acceptable range throughout shift  Outcome: Progressing  Goal: Maintain glucose levels >70mg/dl to <250mg/dl throughout shift  Outcome: Progressing  Goal: No changes in neurological exam by end of shift  Outcome: Progressing  Goal: Learn about and adhere to nutrition recommendations by end of shift  Outcome: Progressing  Goal: Vital signs within normal range for age by end of shift  Outcome: Progressing  Goal: Increase self care and/or family involovement by end of shift  Outcome: Progressing  Goal: Receive DSME education by end of shift  Outcome: Progressing     Problem: Nutrition  Goal: Oral intake greater than 50%  Outcome: Progressing  Goal: Consume prescribed supplement  Outcome: Progressing  Goal: Adequate PO fluid intake  Outcome: Progressing  Goal: BG  mg/dL  Outcome: Progressing  Goal: Lab values WNL  Outcome: Progressing  Goal: Promote healing  Outcome: Progressing  Goal: Maintain stable weight  Outcome: Progressing

## 2024-10-10 NOTE — PROGRESS NOTES
"      Nephrology Progress Note      Nephrology following for ESRD.   Events over night:     Patient is feeling better today   BP is running high  She has a headache      /68 (BP Location: Right arm, Patient Position: Lying)   Pulse 62   Temp 36.3 °C (97.3 °F) (Temporal)   Resp 17   Ht 1.549 m (5' 0.98\")   Wt 66.9 kg (147 lb 7.8 oz)   SpO2 97%   BMI 27.88 kg/m²     Input / Output:  24 HR:   Intake/Output Summary (Last 24 hours) at 10/10/2024 1721  Last data filed at 10/10/2024 1525  Gross per 24 hour   Intake 836 ml   Output 250 ml   Net 586 ml       Physical Exam   Alert and oriented x 3 NAD  Neck: no JVD  CV: RRR  Lungs: CTA bilaterally  Abd: soft, NT, ND   Ext: no lower extremity edema    Scheduled medications  carvedilol, 25 mg, oral, BID  cefTRIAXone, 1 g, intravenous, q24h  epoetin alejandra or biosimilar, 20,000 Units, subcutaneous, Once per day on Monday Wednesday Friday  [Held by provider] heparin (porcine), 5,000 Units, subcutaneous, q8h JOSE DE JESUS  heparin, 2,000 Units, intra-catheter, After Dialysis  heparin, 2,000 Units, intra-catheter, After Dialysis  insulin lispro, 0-10 Units, subcutaneous, Before meals & nightly  losartan, 50 mg, oral, Daily  pantoprazole, 40 mg, oral, Daily   Or  pantoprazole, 40 mg, intravenous, Daily      Continuous medications     PRN medications  PRN medications: acetaminophen **OR** acetaminophen **OR** acetaminophen, dextrose, dextrose, glucagon, glucagon, guaiFENesin, hydrALAZINE, melatonin, ondansetron, polyethylene glycol   Results from last 7 days   Lab Units 10/10/24  0456 10/09/24  0510   SODIUM mmol/L 136 139   POTASSIUM mmol/L 3.5 3.6   CHLORIDE mmol/L 100 101   CO2 mmol/L 30 30   BUN mg/dL 21 23   CREATININE mg/dL 1.44* 2.06*   CALCIUM mg/dL 8.9 8.9   PROTEIN TOTAL g/dL  --  5.2*   BILIRUBIN TOTAL mg/dL  --  0.7   ALK PHOS U/L  --  129   ALT U/L  --  24   AST U/L  --  17   GLUCOSE mg/dL 112* 106*           Results from last 7 days   Lab Units 10/10/24  0456 " 10/09/24  0510 10/08/24  1629   WBC AUTO x10*3/uL 2.7* 3.2* 4.6   HEMOGLOBIN g/dL 9.2* 6.0* 6.6*   HEMATOCRIT % 26.4* 17.9* 19.6*   PLATELETS AUTO x10*3/uL 168 168 232        Assessment & Plan:      Patient is 79 y.o. female hypertension CAD, COPD, HFpEF, DM 2 who is admitted to hospital for generalized weakness and complicated UTI. Nephrology consulted in view of ESRD.     ESRD  -HD MWF at [facility]  -Access is right IJ TDC     Anemia of ESRD but also has myelodysplastic syndrome   -She is on Epogen 3 times a week at high doses  -Receives transfusions for hemoglobin less than 7 as needed  -Status post PRBC this admission     CKD-MBD  -Goal phosphorus less than 5.5.     Complicated UTI  Hypertension  -Accelerated    Recommendations:   -HD tomorrow  -Will keep on Monday Wednesday Friday schedule while she is here  -Dose Epogen Monday Wednesday Friday  -BP is running high so we will need to try for    UF tomorrow and adjust blood pressure medications  -Will give clonidine 0.2 mg now since blood pressure is high with the headache    Please message me through Alea chat with any questions or concerns.     Pinky Christie DO  10/10/2024  5:21 PM     [facility]    [address] [place] [address], [address]   [place] [address]  Office: [phone]

## 2024-10-10 NOTE — PROGRESS NOTES
Tana Hargrove is a 79 y.o. female on day 1 of admission presenting with Complicated urinary tract infection.      Subjective   79 y.o. female with PMHx s/f ESRD on HD (MWF), hypertension, hyperlipidemia, CAD, HFpEF, COPD (no baseline O2), NIDDM2, paroxysmal atrial fibrillation not on anticoagulation secondary to MDS/anemia, chronic anemia of ESRD and myelodysplastic syndrome requiring MARILYNN and intermittent PRBC infusions, osteoarthritis, anxiety, depression, GERD presenting with generalized weakness x 3 weeks. She was hospitalized here on 09/20/24-09/23/24 for generalized weakness, UTI and discharged on Levaquin 50 mg. She finished her course of antibiotics, but still feeling generalized weakness. She went to dialysis on Monday, did 1.5 hours of treatment, did not finish it due to nausea and vomiting. She had decreased appetite since before the previous hospitalization and states it hasn't improved much. Her PCP had another UA done today and told her to come to ED for failed outpatient antibiotic treatment. She denies f/c, lightheadedness, headache, chest pain, sob, abd pain, leg swelling. Patient denies taking any other prescribed medications apart from antibiotics.     ED Course (Summary):   Vitals on presentation: 98.1F, 77 bpm, 20 RR, 199/72, 98% on RA  Labs: CMP-glucose 136, potassium 3.4, BUN/creatinine 21/2.04, alkaline phosphatase 185  Lactate 0.9  CBC-hemoglobin 6.6  Point-of-care urine-3+ protein, 1+ bilirubin, 3+ leukocytes  Imaging: None  Interventions: 1 unit of PRBCs, Rocephin 2 g      10/9:                Today patient seen following dialysis and is awake alert and interactive appropriately.  Voices no specific complaints and no acute events overnight.  Just complains of weakness and fatigue.  Hemoglobin 6.0 today after 6.6 yesterday and given 2 more units packed RBC.  No evidence of active bleeding and she reports only having brown stools.  Urine cultures and blood culture remain pending.    10/10:             Today patient is awake alert and interactive appropriately in the bedside chair.  She does continue to complain of feeling weak and fatigued but not worse than yesterday.  Hemoglobin 9.2 today after transfusion yesterday.  Vital signs stable.  Urine culture reported as initially no significant growth and normal coleman.  WBC has not risen.      Review of Systems   Constitutional:  Positive for fatigue. Negative for chills and fever.   Respiratory:  Negative for cough, chest tightness, shortness of breath and wheezing.    Cardiovascular:  Negative for chest pain, palpitations and leg swelling.   Gastrointestinal: Negative for nausea and vomiting. Negative for abdominal pain, constipation and diarrhea.  Has only seen brown stool  Genitourinary:  Negative for flank pain and hematuria.   Musculoskeletal:  Negative for back pain and joint swelling.   Skin:  Negative for rash and wound.   Neurological:  Positive for weakness. Negative for tremors, syncope and headaches.          Objective     Last Recorded Vitals  /68 (BP Location: Right arm, Patient Position: Lying)   Pulse 62   Temp 36.3 °C (97.3 °F) (Temporal)   Resp 17   Wt 66.9 kg (147 lb 7.8 oz)   SpO2 97%   Intake/Output last 3 Shifts:    Intake/Output Summary (Last 24 hours) at 10/10/2024 1622  Last data filed at 10/10/2024 1525  Gross per 24 hour   Intake 961 ml   Output 250 ml   Net 711 ml       Admission Weight  Weight: 65.8 kg (145 lb) (10/08/24 1448)    Daily Weight  10/09/24 : 66.9 kg (147 lb 7.8 oz)    Image Results  ECG 12 lead  Sinus rhythm  Borderline prolonged QT interval    See ED provider note for full interpretation and clinical correlation  Confirmed by Terri Bermudez ([id number]) on 10/9/2024 5:53:05 PM      Physical Exam  Constitutional: Pale. Pleasant and cooperative. Laying in bed in no acute distress. Conversant.   Skin: Warm and dry; no obvious lesions, rashes, pallor, or jaundice. Good turgor.   Eyes: EOMI. Anicteric sclera.    ENT: Mucous membranes moist; no obvious injury or deformity appreciated.   Head and Neck: Normocephalic, atraumatic. ROM preserved. Trachea midline. No appreciable JVD.   Respiratory: Nonlabored on 2L. Lungs clear to auscultation bilaterally without obvious adventitious sounds. Chest rise is equal.  Cardiovascular: RRR. No gross murmur, gallop, or rub. Extremities are warm and well-perfused with good capillary refill (< 3 seconds). No chest wall tenderness.   GI: Abdomen soft, nontender, nondistended. No obvious organomegaly appreciated. Bowel sounds are present and normoactive.  : No CVA tenderness.   MSK: No gross abnormalities appreciated. No limitations to AROM/PROM appreciated.   Extremities: No cyanosis, edema, or clubbing evident. Neurovascularly intact.   Neuro: A&Ox3. CN 2-12 grossly intact. Able to respond to questions appropriately and clearly. No acute focal neurologic deficits appreciated.  Psych: Appropriate mood and behavior.      Relevant Results               Assessment/Plan        This patient has a central line   Reason for the central line remaining today? Dialysis/Hemapheresis            Assessment & Plan  Complicated urinary tract infection    Acute on chronic anemia of ESRD  -Hgb 6.6 at admission, received 1 PRBCs in ED  -denies acute blood loss, melena, hematochezia, hemoptysis  -Recheck in a.m., continue to replenish as necessary  10/9: Hemoglobin 6.0 today and received 2 more units packed RBC without any obvious evidence of active bleeding.  Continue to monitor.  Hemodynamically stable.  10/10: Hemoglobin 9.2 today after transfusion yesterday.  Continue to monitor.     Hypokalemia  -Potassium 3.4 admission, potassium chloride 20 mEq oral ordered  -Recheck in a.m. and replenish as necessary     Complicated UTI, failed outpatient treatment  -Urine Cx on 10/02/24 shows resistant to ciprofloxacin  -rocephin  -urine culture pending  -does not meet sepsis criteria at admission  10/9: Urine  culture pending.  10/10: Urine culture initially no significant growth and subsequently normal coleman.     Generalized weakness  Ambulatory dysfunction  -likely 2/2 UTI with underlying chronic anemia  -PT/OT appreciated  10/10: Patient declined working with PT today.     ESRD on HD ([facility] schedule)  -Nephrology consult  10/9: Nephrology continuing Monday Wednesday Friday schedule.     HTN, HLD  -pt denies taking any hypertensive meds  -ordered coreg and losartan that she should be taking at home, more education for compliance to keep her BP controlled is recommended  -not currently on any lipid lowering medication     NIDDM-II   -Hold home oral meds for now   -Continue with SSI ACHS   -Accucheks, hypoglycemic protocol   -Monitor and adjust as needed      Paroxysmal A-fib  -Continue on Coreg for rate control  -not on anticoagulation secondary to MDS/anemia      Medication noncompliance  -increase adverse risks of future cardiovascular events  -educated on taking medications as directed                  Abhi Espana MD

## 2024-10-11 ENCOUNTER — APPOINTMENT (OUTPATIENT)
Dept: DIALYSIS | Facility: HOSPITAL | Age: 79
End: 2024-10-11
Payer: MEDICARE

## 2024-10-11 LAB
ALBUMIN SERPL BCP-MCNC: 3.2 G/DL (ref 3.4–5)
ANION GAP SERPL CALC-SCNC: 11 MMOL/L (ref 10–20)
BUN SERPL-MCNC: 26 MG/DL (ref 6–23)
CALCIUM SERPL-MCNC: 9 MG/DL (ref 8.6–10.3)
CHLORIDE SERPL-SCNC: 100 MMOL/L (ref 98–107)
CO2 SERPL-SCNC: 28 MMOL/L (ref 21–32)
CREAT SERPL-MCNC: 1.95 MG/DL (ref 0.5–1.05)
EGFRCR SERPLBLD CKD-EPI 2021: 26 ML/MIN/1.73M*2
ERYTHROCYTE [DISTWIDTH] IN BLOOD BY AUTOMATED COUNT: 17.2 % (ref 11.5–14.5)
GLUCOSE BLD MANUAL STRIP-MCNC: 129 MG/DL (ref 74–99)
GLUCOSE BLD MANUAL STRIP-MCNC: 131 MG/DL (ref 74–99)
GLUCOSE BLD MANUAL STRIP-MCNC: 187 MG/DL (ref 74–99)
GLUCOSE BLD MANUAL STRIP-MCNC: 253 MG/DL (ref 74–99)
GLUCOSE SERPL-MCNC: 118 MG/DL (ref 74–99)
HCT VFR BLD AUTO: 26.2 % (ref 36–46)
HGB BLD-MCNC: 9 G/DL (ref 12–16)
MCH RBC QN AUTO: 31.8 PG (ref 26–34)
MCHC RBC AUTO-ENTMCNC: 34.4 G/DL (ref 32–36)
MCV RBC AUTO: 93 FL (ref 80–100)
NRBC BLD-RTO: 0 /100 WBCS (ref 0–0)
PHOSPHATE SERPL-MCNC: 3.8 MG/DL (ref 2.5–4.9)
PLATELET # BLD AUTO: 163 X10*3/UL (ref 150–450)
POTASSIUM SERPL-SCNC: 3.4 MMOL/L (ref 3.5–5.3)
RBC # BLD AUTO: 2.83 X10*6/UL (ref 4–5.2)
SODIUM SERPL-SCNC: 136 MMOL/L (ref 136–145)
WBC # BLD AUTO: 2.9 X10*3/UL (ref 4.4–11.3)

## 2024-10-11 PROCEDURE — 36415 COLL VENOUS BLD VENIPUNCTURE: CPT | Performed by: FAMILY MEDICINE

## 2024-10-11 PROCEDURE — 97165 OT EVAL LOW COMPLEX 30 MIN: CPT | Mod: GO

## 2024-10-11 PROCEDURE — 80069 RENAL FUNCTION PANEL: CPT | Performed by: FAMILY MEDICINE

## 2024-10-11 PROCEDURE — 99233 SBSQ HOSP IP/OBS HIGH 50: CPT | Performed by: FAMILY MEDICINE

## 2024-10-11 PROCEDURE — 97161 PT EVAL LOW COMPLEX 20 MIN: CPT | Mod: GP | Performed by: PHYSICAL THERAPIST

## 2024-10-11 PROCEDURE — 2500000002 HC RX 250 W HCPCS SELF ADMINISTERED DRUGS (ALT 637 FOR MEDICARE OP, ALT 636 FOR OP/ED)

## 2024-10-11 PROCEDURE — 85027 COMPLETE CBC AUTOMATED: CPT | Performed by: FAMILY MEDICINE

## 2024-10-11 PROCEDURE — 1200000002 HC GENERAL ROOM WITH TELEMETRY DAILY

## 2024-10-11 PROCEDURE — 2500000004 HC RX 250 GENERAL PHARMACY W/ HCPCS (ALT 636 FOR OP/ED)

## 2024-10-11 PROCEDURE — 82947 ASSAY GLUCOSE BLOOD QUANT: CPT

## 2024-10-11 PROCEDURE — 2500000001 HC RX 250 WO HCPCS SELF ADMINISTERED DRUGS (ALT 637 FOR MEDICARE OP): Performed by: FAMILY MEDICINE

## 2024-10-11 PROCEDURE — 6350000001 HC RX 635 EPOETIN >10,000 UNITS: Performed by: INTERNAL MEDICINE

## 2024-10-11 PROCEDURE — 2500000004 HC RX 250 GENERAL PHARMACY W/ HCPCS (ALT 636 FOR OP/ED): Performed by: INTERNAL MEDICINE

## 2024-10-11 PROCEDURE — 2500000001 HC RX 250 WO HCPCS SELF ADMINISTERED DRUGS (ALT 637 FOR MEDICARE OP)

## 2024-10-11 PROCEDURE — 8010000001 HC DIALYSIS - HEMODIALYSIS PER DAY

## 2024-10-11 ASSESSMENT — COGNITIVE AND FUNCTIONAL STATUS - GENERAL
DRESSING REGULAR LOWER BODY CLOTHING: A LOT
STANDING UP FROM CHAIR USING ARMS: A LITTLE
CLIMB 3 TO 5 STEPS WITH RAILING: TOTAL
MOBILITY SCORE: 18
WALKING IN HOSPITAL ROOM: A LITTLE
HELP NEEDED FOR BATHING: A LOT
CLIMB 3 TO 5 STEPS WITH RAILING: A LOT
CLIMB 3 TO 5 STEPS WITH RAILING: A LOT
MOBILITY SCORE: 19
STANDING UP FROM CHAIR USING ARMS: A LITTLE
WALKING IN HOSPITAL ROOM: A LITTLE
EATING MEALS: A LITTLE
PERSONAL GROOMING: A LITTLE
WALKING IN HOSPITAL ROOM: A LITTLE
STANDING UP FROM CHAIR USING ARMS: A LITTLE
DAILY ACTIVITIY SCORE: 24
MOVING TO AND FROM BED TO CHAIR: A LITTLE
DRESSING REGULAR UPPER BODY CLOTHING: A LITTLE
DAILY ACTIVITIY SCORE: 16
TOILETING: A LITTLE
MOVING TO AND FROM BED TO CHAIR: A LITTLE
MOBILITY SCORE: 19
MOVING TO AND FROM BED TO CHAIR: A LITTLE
DAILY ACTIVITIY SCORE: 24

## 2024-10-11 ASSESSMENT — PAIN DESCRIPTION - LOCATION
LOCATION: HEAD
LOCATION: HEAD

## 2024-10-11 ASSESSMENT — ACTIVITIES OF DAILY LIVING (ADL)
BATHING_ASSISTANCE: MODERATE
ADL_ASSISTANCE: INDEPENDENT

## 2024-10-11 ASSESSMENT — PAIN SCALES - GENERAL
PAINLEVEL_OUTOF10: 5 - MODERATE PAIN
PAINLEVEL_OUTOF10: 3
PAINLEVEL_OUTOF10: 2
PAINLEVEL_OUTOF10: 3
PAINLEVEL_OUTOF10: 0 - NO PAIN
PAINLEVEL_OUTOF10: 7
PAINLEVEL_OUTOF10: 0 - NO PAIN

## 2024-10-11 ASSESSMENT — PAIN DESCRIPTION - DESCRIPTORS
DESCRIPTORS: ACHING
DESCRIPTORS: HEAVINESS
DESCRIPTORS: ACHING

## 2024-10-11 NOTE — CARE PLAN
The patient's goals for the shift include      The clinical goals for the shift include pt will remain hemodynamically stable during this shift      Problem: Fall/Injury  Goal: Not fall by end of shift  Outcome: Progressing  Goal: Be free from injury by end of the shift  Outcome: Progressing  Goal: Verbalize understanding of personal risk factors for fall in the hospital  Outcome: Progressing  Goal: Verbalize understanding of risk factor reduction measures to prevent injury from fall in the home  Outcome: Progressing  Goal: Use assistive devices by end of the shift  Outcome: Progressing  Goal: Pace activities to prevent fatigue by end of the shift  Outcome: Progressing     Problem: Skin  Goal: Decreased wound size/increased tissue granulation at next dressing change  Outcome: Progressing  Goal: Participates in plan/prevention/treatment measures  Outcome: Progressing  Goal: Prevent/manage excess moisture  Outcome: Progressing  Goal: Prevent/minimize sheer/friction injuries  Outcome: Progressing  Goal: Promote/optimize nutrition  Outcome: Progressing  Goal: Promote skin healing  Outcome: Progressing     Problem: Pain - Adult  Goal: Verbalizes/displays adequate comfort level or baseline comfort level  Outcome: Progressing     Problem: Safety - Adult  Goal: Free from fall injury  Outcome: Progressing     Problem: Discharge Planning  Goal: Discharge to home or other facility with appropriate resources  Outcome: Progressing     Problem: Chronic Conditions and Co-morbidities  Goal: Patient's chronic conditions and co-morbidity symptoms are monitored and maintained or improved  Outcome: Progressing     Problem: Pain  Goal: Takes deep breaths with improved pain control throughout the shift  Outcome: Progressing  Goal: Turns in bed with improved pain control throughout the shift  Outcome: Progressing  Goal: Walks with improved pain control throughout the shift  Outcome: Progressing  Goal: Performs ADL's with improved pain  control throughout shift  Outcome: Progressing  Goal: Participates in PT with improved pain control throughout the shift  Outcome: Progressing  Goal: Free from opioid side effects throughout the shift  Outcome: Progressing  Goal: Free from acute confusion related to pain meds throughout the shift  Outcome: Progressing     Problem: Diabetes  Goal: Achieve decreasing blood glucose levels by end of shift  Outcome: Progressing  Goal: Increase stability of blood glucose readings by end of shift  Outcome: Progressing  Goal: Decrease in ketones present in urine by end of shift  Outcome: Progressing  Goal: Maintain electrolyte levels within acceptable range throughout shift  Outcome: Progressing  Goal: Maintain glucose levels >70mg/dl to <250mg/dl throughout shift  Outcome: Progressing  Goal: No changes in neurological exam by end of shift  Outcome: Progressing  Goal: Learn about and adhere to nutrition recommendations by end of shift  Outcome: Progressing  Goal: Vital signs within normal range for age by end of shift  Outcome: Progressing  Goal: Increase self care and/or family involovement by end of shift  Outcome: Progressing  Goal: Receive DSME education by end of shift  Outcome: Progressing     Problem: Nutrition  Goal: Oral intake greater than 50%  Outcome: Progressing  Goal: Consume prescribed supplement  Outcome: Progressing  Goal: Adequate PO fluid intake  Outcome: Progressing  Goal: BG  mg/dL  Outcome: Progressing  Goal: Lab values WNL  Outcome: Progressing  Goal: Promote healing  Outcome: Progressing  Goal: Maintain stable weight  Outcome: Progressing

## 2024-10-11 NOTE — PROGRESS NOTES
Physical Therapy    Physical Therapy Evaluation    Patient Name: Tana Hargrove  MRN: [id number]  Today's Date: 10/11/2024   Time Calculation  Start Time: 0940  Stop Time: 0951  Time Calculation (min): 11 min  3311/3311-A    Assessment/Plan   PT Assessment  PT Assessment Results: Decreased strength, Decreased endurance, Decreased mobility  Rehab Prognosis: Good  Barriers to Discharge: none  Evaluation/Treatment Tolerance: Patient limited by fatigue  Assessment Comment: pt demos improving strength and mobility/activity tolerance. Recommend LOW INTENSITY REHAB (and pt request for HH AIDES) to help restore maximal strength/endurance needed for living alone  End of Session Patient Position: Bed, 3 rail up, Alarm on  IP OR SWING BED PT PLAN  Inpatient or Swing Bed: Inpatient  PT Plan  Treatment/Interventions: Transfer training, Gait training, Neuromuscular re-education, Therapeutic exercise, Therapeutic activity, Endurance training  PT Plan: Ongoing PT  PT Frequency: 3 times per week  PT Discharge Recommendations: Low intensity level of continued care  PT Recommended Transfer Status: Stand by assist  PT - OK to Discharge: Yes    Subjective     Current Problem:  1. Complicated urinary tract infection        2. Failure of outpatient treatment        3. Generalized weakness        4. Anemia due to chronic kidney disease, on chronic dialysis (Multi)          General Visit Information:  General  Reason for Referral: complicated UTI. presenting with generalized weakness x 3 weeks. She was hospitalized here on 09/20/24-09/23/24 for generalized weakness, UTI  Referred By: MARLON ESCALANTE. PT FOR IMPAIRED MOBILITY  Past Medical History Relevant to Rehab: ESRD on HD (MWF), hypertension, hyperlipidemia, CAD, HFpEF, COPD (no baseline O2), NIDDM2, paroxysmal atrial fibrillation not on anticoagulation secondary to MDS/anemia, chronic anemia of ESRD and myelodysplastic syndrome requiring MARILYNN and intermittent PRBC infusions, osteoarthritis,  "anxiety, depression, GERD  Missed Visit: Yes  Missed Visit Reason: Patient refused (pt declines further mobility wtih PT, just RTB with PCA assist, states she sat in chair today/has been using BSC. PCA present states pt is mobilizing well)  Family/Caregiver Present: No  Patient Position Received: Bed, 3 rail up, Alarm on  General Comment: pt seen in 3311, pt needed some encouragement (from hospital advocate present) to participate in PT as pt had just gotten back into bed after \"up in chair 1 1/2hrs\". PTobserved less pallor than on 10/10 attempt.    Home Living:  Home Living  Type of Home: Condo  Lives With: Alone  Home Adaptive Equipment: Walker rolling or standard  Home Layout: One level  Home Access: Level entry    Prior Level of Function:  Prior Function Per Pt/Caregiver Report  Level of [place]: Independent with ADLs and functional transfers, Independent with homemaking with wheelchair  Receives Help From: Other (Comment) (pt is interested in receiving HH support)  Ambulatory Assistance: Needs assistance  Transfers: Assistive device  Gait: Assistive device (uses ROLLATOR at all times)    Precautions:  Precautions  Medical Precautions: Fall precautions  Precautions Comment: watch H&H (was 6.0, up to 9.0 after 2u more PRBC's)     Objective     Pain:  Pain Assessment  Pain Assessment: 0-10  0-10 (Numeric) Pain Score: 0 - No pain  Pain Location: Head  Pain Descriptors: Heaviness  Pain Onset: Other (Comment) (:just since Claire been in hospital (pt attributes to not drinking coffee))    Cognition:  Cognition  Orientation Level: Oriented X4    General Assessments:  General Observation  General Observation: transfer OOB, gait training to bathroom toilet then full [place] around room, RTB   Activity Tolerance  Endurance: Tolerates 10 - 20 min exercise with multiple rests  Activity Tolerance Comments: dizzy only brief initial sitting     Dynamic Sitting Balance  Dynamic Sitting-Comments: good  Dynamic Standing " Balance  Dynamic Standing-Comments: fair with walker support, no overt LOB    Functional Assessments:     Bed Mobility 1  Bed Mobility 1: Supine to sitting, Sitting to supine  Level of Assistance 1: Close supervision  Transfer 1  Transfer From 1: Bed to  Technique 1: Sit to stand  Transfer Device 1: Walker  Transfer Level of Assistance 1: Close supervision, Contact guard  Transfers 2  Transfer to 2: Toilet  Technique 2: Stand to sit, Sit to stand  Transfer Device 2: Walker  Transfer Level of Assistance 2: Minimum assistance  Trials/Comments 2: low toilet  Ambulation/Gait Training 1  Device 1: Rolling walker  Assistance 1: Contact guard, Close supervision  Quality of Gait 1:  (slower frankie, no LOB)  Comments/Distance (ft) 1: 10, 40  Stairs  Stairs: No       Extremity/Trunk Assessments:        RLE   RLE : Within Functional Limits  LLE   LLE : Within Functional Limits    Outcome Measures:     [facility] Basic Mobility  Turning from your back to your side while in a flat bed without using bedrails: None  Moving from lying on your back to sitting on the side of a flat bed without using bedrails: None  Moving to and from bed to chair (including a wheelchair): A little  Standing up from a chair using your arms (e.g. wheelchair or bedside chair): A little  To walk in hospital room: A little  Climbing 3-5 steps with railing: Total  Basic Mobility - Total Score: 18       Goals:  Encounter Problems       Encounter Problems (Active)       Mobility       STG - Patient will ambulate household distance using FWW/ROLLATOR modified indep       Start:  10/11/24    Expected End:  10/25/24               PT Transfers       STG - Patient will perform toilet transfer using FWW/ROLLATOR modified indep       Start:  10/11/24    Expected End:  10/25/24               Pain - Adult          Strengthening        Pt will perform 10+ reps AAROM/AROM/RROM BLE to improve functional strength needed for improved mobility.        Start:  10/11/24     Expected End:  10/25/24                 Education Documentation  Mobility Training, taught by Vivian Huerta PT at 10/11/2024 10:10 AM.  Learner: Patient  Readiness: Acceptance  Method: Explanation, Demonstration  Response: Verbalizes Understanding, Needs Reinforcement    Education Comments  No comments found.

## 2024-10-11 NOTE — PROGRESS NOTES
Tana Hargrove is a 79 y.o. female on day 2 of admission presenting with Complicated urinary tract infection.      Subjective   79 y.o. female with PMHx s/f ESRD on HD (MWF), hypertension, hyperlipidemia, CAD, HFpEF, COPD (no baseline O2), NIDDM2, paroxysmal atrial fibrillation not on anticoagulation secondary to MDS/anemia, chronic anemia of ESRD and myelodysplastic syndrome requiring MARILYNN and intermittent PRBC infusions, osteoarthritis, anxiety, depression, GERD presenting with generalized weakness x 3 weeks. She was hospitalized here on 09/20/24-09/23/24 for generalized weakness, UTI and discharged on Levaquin 50 mg. She finished her course of antibiotics, but still feeling generalized weakness. She went to dialysis on Monday, did 1.5 hours of treatment, did not finish it due to nausea and vomiting. She had decreased appetite since before the previous hospitalization and states it hasn't improved much. Her PCP had another UA done today and told her to come to ED for failed outpatient antibiotic treatment. She denies f/c, lightheadedness, headache, chest pain, sob, abd pain, leg swelling. Patient denies taking any other prescribed medications apart from antibiotics.     ED Course (Summary):   Vitals on presentation: 98.1F, 77 bpm, 20 RR, 199/72, 98% on RA  Labs: CMP-glucose 136, potassium 3.4, BUN/creatinine 21/2.04, alkaline phosphatase 185  Lactate 0.9  CBC-hemoglobin 6.6  Point-of-care urine-3+ protein, 1+ bilirubin, 3+ leukocytes  Imaging: None  Interventions: 1 unit of PRBCs, Rocephin 2 g      10/9:                Today patient seen following dialysis and is awake alert and interactive appropriately.  Voices no specific complaints and no acute events overnight.  Just complains of weakness and fatigue.  Hemoglobin 6.0 today after 6.6 yesterday and given 2 more units packed RBC.  No evidence of active bleeding and she reports only having brown stools.  Urine cultures and blood culture remain pending.    10/10:             Today patient is awake alert and interactive appropriately in the bedside chair.  She does continue to complain of feeling weak and fatigued but not worse than yesterday.  Hemoglobin 9.2 today after transfusion yesterday.  Vital signs stable.  Urine culture reported as initially no significant growth and normal coleman.  WBC has not risen.    10/11:                Today patient remains awake alert and interactive appropriately.  Continues to complain of feeling weak and fatigued and not able to be discharged home today.  Discussed possible discharge to SNF but states she would refuse this.  AM-PAC today of 18.  Underwent ultrafiltration of 2 L today for improved blood pressure control.  If stable likely discharge home tomorrow.      Review of Systems   Constitutional:  Positive for fatigue. Negative for chills and fever.   Respiratory:  Negative for cough, chest tightness, shortness of breath and wheezing.    Cardiovascular:  Negative for chest pain, palpitations and leg swelling.   Gastrointestinal: Negative for nausea and vomiting. Negative for abdominal pain, constipation and diarrhea.  Has only seen brown stool  Genitourinary:  Negative for flank pain and hematuria.   Musculoskeletal:  Negative for back pain and joint swelling.   Skin:  Negative for rash and wound.   Neurological:  Positive for weakness. Negative for tremors, syncope and headaches.          Objective     Last Recorded Vitals  /69 (BP Location: Left arm, Patient Position: Lying)   Pulse 68   Temp 36.8 °C (98.2 °F) (Temporal)   Resp 18   Wt 66.9 kg (147 lb 7.8 oz)   SpO2 98%   Intake/Output last 3 Shifts:    Intake/Output Summary (Last 24 hours) at 10/11/2024 1553  Last data filed at 10/11/2024 1530  Gross per 24 hour   Intake 520 ml   Output 600 ml   Net -80 ml       Admission Weight  Weight: 65.8 kg (145 lb) (10/08/24 1448)    Daily Weight  10/09/24 : 66.9 kg (147 lb 7.8 oz)    Image Results  ECG 12 lead  Sinus  rhythm  Borderline prolonged QT interval    See ED provider note for full interpretation and clinical correlation  Confirmed by Terri Bermudez ([id number]) on 10/9/2024 5:53:05 PM      Physical Exam  Constitutional: Pale. Pleasant and cooperative. Laying in bed in no acute distress. Conversant.   Skin: Warm and dry; no obvious lesions, rashes, pallor, or jaundice. Good turgor.   Eyes: EOMI. Anicteric sclera.   ENT: Mucous membranes moist; no obvious injury or deformity appreciated.   Head and Neck: Normocephalic, atraumatic. ROM preserved. Trachea midline. No appreciable JVD.   Respiratory: Nonlabored on 2L. Lungs clear to auscultation bilaterally without obvious adventitious sounds. Chest rise is equal.  Cardiovascular: RRR. No gross murmur, gallop, or rub. Extremities are warm and well-perfused with good capillary refill (< 3 seconds). No chest wall tenderness.   GI: Abdomen soft, nontender, nondistended. No obvious organomegaly appreciated. Bowel sounds are present and normoactive.  : No CVA tenderness.   MSK: No gross abnormalities appreciated. No limitations to AROM/PROM appreciated.   Extremities: No cyanosis, edema, or clubbing evident. Neurovascularly intact.   Neuro: A&Ox3. CN 2-12 grossly intact. Able to respond to questions appropriately and clearly. No acute focal neurologic deficits appreciated.  Psych: Appropriate mood and behavior.      Relevant Results               Assessment/Plan        This patient has a central line   Reason for the central line remaining today? Dialysis/Hemapheresis            Assessment & Plan  Complicated urinary tract infection    Acute on chronic anemia of ESRD  -Hgb 6.6 at admission, received 1 PRBCs in ED  -denies acute blood loss, melena, hematochezia, hemoptysis  -Recheck in a.m., continue to replenish as necessary  10/9: Hemoglobin 6.0 today and received 2 more units packed RBC without any obvious evidence of active bleeding.  Continue to monitor.  Hemodynamically  stable.  10/10: Hemoglobin 9.2 today after transfusion yesterday.   10/11: Hemoglobin appearing reasonably stable.  No evidence of active bleeding.  Continue to monitor.     Hypokalemia  -Potassium 3.4 admission, potassium chloride 20 mEq oral ordered  -Recheck in a.m. and replenish as necessary     Complicated UTI, failed outpatient treatment  -Urine Cx on 10/02/24 shows resistant to ciprofloxacin  -rocephin  -urine culture pending  -does not meet sepsis criteria at admission  10/9: Urine culture pending.  10/10: Urine culture initially no significant growth and subsequently normal coleman.     Generalized weakness  Ambulatory dysfunction  -likely 2/2 UTI with underlying chronic anemia  -PT/OT appreciated  10/10: Patient declined working with PT today.     ESRD on HD (F schedule)  -Nephrology consult  10/9: Nephrology continuing Monday Wednesday Friday schedule.     HTN, HLD  -pt denies taking any hypertensive meds  -ordered coreg and losartan that she should be taking at home, more education for compliance to keep her BP controlled is recommended  -not currently on any lipid lowering medication     NIDDM-II   -Hold home oral meds for now   -Continue with SSI ACHS   -Accucheks, hypoglycemic protocol   -Monitor and adjust as needed      Paroxysmal A-fib  -Continue on Coreg for rate control  -not on anticoagulation secondary to MDS/anemia      Medication noncompliance  -increase adverse risks of future cardiovascular events  -educated on taking medications as directed                  Abhi Espana MD

## 2024-10-11 NOTE — POST-PROCEDURE NOTE
Report to Receiving RN:    Report To: Elva RANKIN  Time Report Called: 0468  Hand-Off Communication: Pt removed 2L, /73 HR 59  Complications During Treatment: No  Ultrafiltration Treatment: No  Medications Administered During Dialysis: No  Blood Products Administered During Dialysis: No  Labs Sent During Dialysis: No  Heparin Drip Rate Changes: No  Dialysis Catheter Dressing: Clean and dry  Last Dressing Change: 10/9/24    Electronic Signatures:   (Signed Santos Fernandez OCDT)   Authored:    (Signed )   Authored:     Last Updated: 5:20 PM by SANTOS FERNANDEZ

## 2024-10-11 NOTE — PROGRESS NOTES
"      Nephrology Progress Note      Nephrology following for ESRD.   Events over night:     Patient is feeling better today but she still does not feel well enough to go home.  She is hoping that home care will be set up for some help at home.  HD later today      /69 (BP Location: Left arm, Patient Position: Lying)   Pulse 67   Temp 36.6 °C (97.9 °F) (Temporal)   Resp 18   Ht 1.549 m (5' 0.98\")   Wt 66.9 kg (147 lb 7.8 oz)   SpO2 98%   BMI 27.88 kg/m²     Input / Output:  24 HR:   Intake/Output Summary (Last 24 hours) at 10/11/2024 1040  Last data filed at 10/11/2024 1001  Gross per 24 hour   Intake 756 ml   Output 600 ml   Net 156 ml       Physical Exam   Alert and oriented x 3 NAD  Neck: no JVD  CV: RRR  Lungs: CTA bilaterally  Abd: soft, NT, ND   Ext: no lower extremity edema    Scheduled medications  carvedilol, 25 mg, oral, BID  cefTRIAXone, 1 g, intravenous, q24h  epoetin alejandra or biosimilar, 20,000 Units, subcutaneous, Once per day on Monday Wednesday Friday  [Held by provider] heparin (porcine), 5,000 Units, subcutaneous, q8h JOSE DE JESUS  heparin, 2,000 Units, intra-catheter, After Dialysis  heparin, 2,000 Units, intra-catheter, After Dialysis  insulin lispro, 0-10 Units, subcutaneous, Before meals & nightly  losartan, 50 mg, oral, Daily  pantoprazole, 40 mg, oral, Daily   Or  pantoprazole, 40 mg, intravenous, Daily      Continuous medications     PRN medications  PRN medications: acetaminophen **OR** acetaminophen **OR** acetaminophen, dextrose, dextrose, glucagon, glucagon, guaiFENesin, hydrALAZINE, melatonin, ondansetron, polyethylene glycol   Results from last 7 days   Lab Units 10/11/24  0414 10/10/24  0456 10/09/24  0510   SODIUM mmol/L 136   < > 139   POTASSIUM mmol/L 3.4*   < > 3.6   CHLORIDE mmol/L 100   < > 101   CO2 mmol/L 28   < > 30   BUN mg/dL 26*   < > 23   CREATININE mg/dL 1.95*   < > 2.06*   CALCIUM mg/dL 9.0   < > 8.9   PROTEIN TOTAL g/dL  --   --  5.2*   BILIRUBIN TOTAL mg/dL  --   -- "  0.7   ALK PHOS U/L  --   --  129   ALT U/L  --   --  24   AST U/L  --   --  17   GLUCOSE mg/dL 118*   < > 106*    < > = values in this interval not displayed.           Results from last 7 days   Lab Units 10/11/24  0414 10/10/24  0456 10/09/24  0510   WBC AUTO x10*3/uL 2.9* 2.7* 3.2*   HEMOGLOBIN g/dL 9.0* 9.2* 6.0*   HEMATOCRIT % 26.2* 26.4* 17.9*   PLATELETS AUTO x10*3/uL 163 168 168        Assessment & Plan:      Patient is 79 y.o. female hypertension CAD, COPD, HFpEF, DM 2 who is admitted to hospital for generalized weakness and complicated UTI. Nephrology consulted in view of ESRD.     ESRD  -HD MWF at [facility]  -Access is right IJ TDC     Anemia of ESRD but also has myelodysplastic syndrome   -She is on Epogen 3 times a week at high doses  -Receives transfusions for hemoglobin less than 7 as needed  -Status post PRBC this admission     CKD-MBD  -Goal phosphorus less than 5.5.     Complicated UTI  Hypertension  -Accelerated    Recommendations:   -HD today  -Will keep on Monday Wednesday Friday schedule while she is here  -Dose Epogen Monday Wednesday Friday  -BP is running high so we will need to try for 2 L   UF today and adjust blood pressure medications      Please message me through Moodsnap chat with any questions or concerns.     Pinky Christie DO  10/11/2024  10:40 AM     [facility]    [address], [address]   [place] [address]  Office: [phone]

## 2024-10-11 NOTE — PROGRESS NOTES
Occupational Therapy  Evaluation    Patient Name: Tana Hargrove  MRN: [id number]  Today's Date: 10/11/2024  Time Calculation  Start Time: 0757  Stop Time: 0808  Time Calculation (min): 11 min    Current Problem:   1. Complicated urinary tract infection    2. Failure of outpatient treatment    3. Generalized weakness    4. Anemia due to chronic kidney disease, on chronic dialysis (Multi)        OT order: OT eval and treat   Referred by: Yousif  Reason for referral: ADLs, safety assessment  Past medical history related to rehab:  has a past medical history of Abnormal levels of other serum enzymes, Acute kidney failure, Anemia, CKD (chronic kidney disease), COPD (chronic obstructive pulmonary disease) (Multi), Coronary artery disease, Disease of blood and blood-forming organs, unspecified, HLD (hyperlipidemia), Hypertension, MDS (myelodysplastic syndrome) (Multi), Personal history of other diseases of the musculoskeletal system and connective tissue, Personal history of other specified conditions, Personal history of other specified conditions, and Type 2 diabetes mellitus.    She has no past medical history of Adverse effect of anesthesia, Arthritis, Asthma, Awareness under anesthesia, CHF (congestive heart failure), Delayed emergence from general anesthesia, Disease of thyroid gland, Hard to intubate, History of transfusion, Malignant hyperthermia, PONV (postoperative nausea and vomiting), Pseudocholinesterase deficiency, Sickle cell anemia (Multi), Spinal headache, or Stroke (Multi).     Precautions:   Hearing/Visual Limitations: intact  Medical Precautions: Fall precautions    ASSESSMENT  OT Assessment: OT eval completed. The patient is functioning below baseline for ADLs and mobility. can benefit from continued OT. Pt with Decreased ADL status, Decreased upper extremity strength, Decreased safe judgment during ADL, Decreased cognition, Decreased endurance, Decreased fine motor control, Decreased functional mobility,  "Decreased gross motor control, Decreased IADLs  Prognosis:    Barriers to discharge: Decreased caregiver support  Tolerance:      PLAN  Frequency: 3 times per week  Treatment Interventions: ADL retraining, Functional transfer training, UE strengthening/ROM, Endurance training, Cognitive reorientation, Patient/family training, Equipment evaluation/education, Neuromuscular reeducation  Discharge Recommendations: Low intensity level of continued care  OT OK to discharge: Yes    GENERAL VISIT INFORMATION   Start of session communication:    End of session communication: Bedside nurse  Family/caregiver present: No  Caregiver feedback:    Co-Treatment:  (no)  Reason for co-treatment:     Position Pt Received:  Bed, 3 rail up, Alarm on  End of session position: Alarm on, Up in chair    SUBJECTIVE  Home Living:  Type of Home: Condo  Lives With: Alone  Home Adaptive Equipment: Walker rolling or standard (rollator)  Home Layout: One level  Home Access: Level entry     Prior Level of Function:  Receives Help From:  (pt voiced she has no external assistance at home. was interested in HHA but has not been able to obtain one)  ADL Assistance: Independent  Homemaking Assistance: Independent  Ambulatory Assistance: Independent (mod I with her rollator)    IADL History:       Pain:  Assessment: 0-10  Score:  (\"a little\")  Type: Acute pain  Location: Head  Interventions: Repositioned, Rest  Response to pain interventions:      OBJECTIVE  Vital Signs:       Cognition:  Overall Cognitive Status: Within Functional Limits  Orientation Level: Oriented X4  Processing Speed: Within funtional limits             Current ADL function:   EATING:  Independent     GROOMING: Independent     BATHING: Moderate     UB DRESSING: Minimal     LB DRESSING: Moderate     TOILETING: Minimal    ADL comments:  anticipated current levels    Activity Tolerance:  Endurance: Decreased tolerance for upright activites  Activity Tolerance Comments: mild dizziness " with first sitting up.    Bed Mobility/Transfers:   Bed Mobility  Bed Mobility: Yes  Bed Mobility 1  Bed Mobility 1: Supine to sitting  Level of Assistance 1: Close supervision  Transfers  Transfer:  (sit<>stand CGAx1 FWW)    Ambulation/Gait Training:  Functional Mobility  Functional Mobility Performed:  (pt performed functional mobility around bedside to the chair with CGAX1)    Sitting Balance:  Static Sitting Balance  Static Sitting-Level of Assistance: Close supervision  Dynamic Sitting Balance  Dynamic Sitting-Level of Assistance: Close supervision    Standing Balance:  Static Standing Balance  Static Standing-Level of Assistance: Contact guard  Dynamic Standing Balance  Dynamic Standing-Level of Assistance: Contact guard, Minimum assistance    Vision: Vision - Basic Assessment  Current Vision: No visual deficits   and      Sensation:  Light Touch: No apparent deficits    Strength:  Strength Comments: BUE grossly 4-/5    Perception:  Inattention/Neglect: Appears intact    Coordination:  Movements are Fluid and Coordinated: Yes     Hand Function:  Hand Function  Gross Grasp: Functional  Coordination: Functional    Extremities: RUE   RUE : Within Functional Limits and LUE   LUE: Within Functional Limits    Outcome Measures: [facility] Daily Activity   Putting on and taking off regular lower body clothing: A lot  Bathing (including washing, rinsing, drying): A lot  Putting on and taking off regular upper body clothing: A little  Toileting, which includes using toilet, bedpan or urinal: A little  Taking care of personal grooming such as brushing teeth: A little   Eating Meals: A little   Daily Activity - Total Score: 16    EDUCATION:     Education Documentation  ADL Training, taught by Ana Milner OT at 10/11/2024  1:05 PM.  Learner: Patient  Readiness: Acceptance  Method: Explanation  Response: Needs Reinforcement    Education Comments  No comments found.        Goals:   Encounter Problems       Encounter Problems  (Active)       ADLs       Patient with complete lower body dressing with modified independent level of assistance donning and doffing all LE clothes  with PRN adaptive equipment while supported sitting and standing (Progressing)       Start:  10/11/24    Expected End:  10/25/24               BALANCE       Patient will tolerate standing for 10 minutes to modified independent level of assistance with least restrictive device in order to improve functional activity tolerance for ADL tasks. (Progressing)       Start:  10/11/24    Expected End:  10/25/24               MOBILITY       Patient will perform Functional mobility max Household distances/Community Distances with modified independent level of assistance and least restrictive device in order to improve safety and functional mobility. (Progressing)       Start:  10/11/24    Expected End:  10/25/24

## 2024-10-11 NOTE — PRE-PROCEDURE NOTE
Report from Sending RN:    Report From: Elva RANKIN  Recent Surgery of Procedure: No  Baseline Level of Consciousness (LOC): AOx4  Oxygen Use: No  Type: ROOM AIR  Diabetic: Yes  Last BP Med Given Day of Dialysis: SEE MAR  Last Pain Med Given: SEE MAR  Lab Tests to be Obtained with Dialysis: Yes  Blood Transfusion to be Given During Dialysis: No  Available IV Access: Yes  Medications to be Administered During Dialysis: No  Continuous IV Infusion Running: No  Restraints on Currently or in the Last 24 Hours: No  Hand-Off Communication: Pt is alert and stable. Report taking from bedside RN  Dialysis Catheter Dressing: Clean and dry  Last Dressing Change: 10/9/24

## 2024-10-12 ENCOUNTER — HOME HEALTH ADMISSION (OUTPATIENT)
Dept: HOME HEALTH SERVICES | Facility: HOME HEALTH | Age: 79
End: 2024-10-12
Payer: MEDICARE

## 2024-10-12 ENCOUNTER — PHARMACY VISIT (OUTPATIENT)
Dept: PHARMACY | Facility: CLINIC | Age: 79
End: 2024-10-12
Payer: COMMERCIAL

## 2024-10-12 ENCOUNTER — DOCUMENTATION (OUTPATIENT)
Dept: HOME HEALTH SERVICES | Facility: HOME HEALTH | Age: 79
End: 2024-10-12
Payer: MEDICARE

## 2024-10-12 VITALS
BODY MASS INDEX: 27.85 KG/M2 | WEIGHT: 147.49 LBS | DIASTOLIC BLOOD PRESSURE: 65 MMHG | SYSTOLIC BLOOD PRESSURE: 178 MMHG | TEMPERATURE: 98.4 F | HEART RATE: 67 BPM | RESPIRATION RATE: 18 BRPM | OXYGEN SATURATION: 98 % | HEIGHT: 61 IN

## 2024-10-12 PROBLEM — N39.0 COMPLICATED URINARY TRACT INFECTION: Status: RESOLVED | Noted: 2024-10-08 | Resolved: 2024-10-12

## 2024-10-12 LAB
ANION GAP SERPL CALC-SCNC: 11 MMOL/L (ref 10–20)
BUN SERPL-MCNC: 20 MG/DL (ref 6–23)
CALCIUM SERPL-MCNC: 9 MG/DL (ref 8.6–10.3)
CHLORIDE SERPL-SCNC: 102 MMOL/L (ref 98–107)
CO2 SERPL-SCNC: 26 MMOL/L (ref 21–32)
CREAT SERPL-MCNC: 1.85 MG/DL (ref 0.5–1.05)
EGFRCR SERPLBLD CKD-EPI 2021: 27 ML/MIN/1.73M*2
ERYTHROCYTE [DISTWIDTH] IN BLOOD BY AUTOMATED COUNT: 17.5 % (ref 11.5–14.5)
GLUCOSE BLD MANUAL STRIP-MCNC: 126 MG/DL (ref 74–99)
GLUCOSE BLD MANUAL STRIP-MCNC: 153 MG/DL (ref 74–99)
GLUCOSE SERPL-MCNC: 122 MG/DL (ref 74–99)
HCT VFR BLD AUTO: 27.8 % (ref 36–46)
HGB BLD-MCNC: 9.1 G/DL (ref 12–16)
MAGNESIUM SERPL-MCNC: 1.67 MG/DL (ref 1.6–2.4)
MCH RBC QN AUTO: 32 PG (ref 26–34)
MCHC RBC AUTO-ENTMCNC: 32.7 G/DL (ref 32–36)
MCV RBC AUTO: 98 FL (ref 80–100)
NRBC BLD-RTO: 0 /100 WBCS (ref 0–0)
PLATELET # BLD AUTO: 144 X10*3/UL (ref 150–450)
POTASSIUM SERPL-SCNC: 3.8 MMOL/L (ref 3.5–5.3)
RBC # BLD AUTO: 2.84 X10*6/UL (ref 4–5.2)
SODIUM SERPL-SCNC: 135 MMOL/L (ref 136–145)
WBC # BLD AUTO: 3.2 X10*3/UL (ref 4.4–11.3)

## 2024-10-12 PROCEDURE — 2500000002 HC RX 250 W HCPCS SELF ADMINISTERED DRUGS (ALT 637 FOR MEDICARE OP, ALT 636 FOR OP/ED)

## 2024-10-12 PROCEDURE — 2500000001 HC RX 250 WO HCPCS SELF ADMINISTERED DRUGS (ALT 637 FOR MEDICARE OP): Performed by: INTERNAL MEDICINE

## 2024-10-12 PROCEDURE — 2500000001 HC RX 250 WO HCPCS SELF ADMINISTERED DRUGS (ALT 637 FOR MEDICARE OP)

## 2024-10-12 PROCEDURE — 99239 HOSP IP/OBS DSCHRG MGMT >30: CPT | Performed by: FAMILY MEDICINE

## 2024-10-12 PROCEDURE — 85027 COMPLETE CBC AUTOMATED: CPT | Performed by: FAMILY MEDICINE

## 2024-10-12 PROCEDURE — RXMED WILLOW AMBULATORY MEDICATION CHARGE

## 2024-10-12 PROCEDURE — 83735 ASSAY OF MAGNESIUM: CPT | Performed by: FAMILY MEDICINE

## 2024-10-12 PROCEDURE — 80048 BASIC METABOLIC PNL TOTAL CA: CPT | Performed by: FAMILY MEDICINE

## 2024-10-12 PROCEDURE — 36415 COLL VENOUS BLD VENIPUNCTURE: CPT | Performed by: FAMILY MEDICINE

## 2024-10-12 PROCEDURE — 82947 ASSAY GLUCOSE BLOOD QUANT: CPT

## 2024-10-12 RX ORDER — CEFDINIR 300 MG/1
300 CAPSULE ORAL 2 TIMES DAILY
Qty: 6 CAPSULE | Refills: 0 | Status: SHIPPED | OUTPATIENT
Start: 2024-10-12 | End: 2024-10-15

## 2024-10-12 RX ORDER — AMLODIPINE BESYLATE 5 MG/1
5 TABLET ORAL DAILY
Status: DISCONTINUED | OUTPATIENT
Start: 2024-10-12 | End: 2024-10-12 | Stop reason: HOSPADM

## 2024-10-12 RX ORDER — AMLODIPINE BESYLATE 5 MG/1
5 TABLET ORAL DAILY
Qty: 30 TABLET | Refills: 0 | Status: SHIPPED | OUTPATIENT
Start: 2024-10-13 | End: 2024-11-12

## 2024-10-12 RX ORDER — TALC
3 POWDER (GRAM) TOPICAL NIGHTLY PRN
Qty: 1 TABLET | Refills: 0 | Status: SHIPPED | OUTPATIENT
Start: 2024-10-12 | End: 2024-11-11

## 2024-10-12 ASSESSMENT — COGNITIVE AND FUNCTIONAL STATUS - GENERAL
MOBILITY SCORE: 24
PERSONAL GROOMING: A LITTLE
DAILY ACTIVITIY SCORE: 23

## 2024-10-12 ASSESSMENT — PAIN SCALES - GENERAL: PAINLEVEL_OUTOF10: 0 - NO PAIN

## 2024-10-12 ASSESSMENT — PAIN - FUNCTIONAL ASSESSMENT: PAIN_FUNCTIONAL_ASSESSMENT: 0-10

## 2024-10-12 NOTE — PROGRESS NOTES
"      Nephrology Progress Note      Nephrology following for ESRD.   Events over night:     HD yesterday, 2 L  UF    /71 (BP Location: Left arm, Patient Position: Sitting)   Pulse 61   Temp 36.7 °C (98 °F) (Temporal)   Resp 17   Ht 1.549 m (5' 0.98\")   Wt 66.9 kg (147 lb 7.8 oz)   SpO2 97%   BMI 27.88 kg/m²     Input / Output:  24 HR:   Intake/Output Summary (Last 24 hours) at 10/12/2024 0950  Last data filed at 10/12/2024 0907  Gross per 24 hour   Intake 1400 ml   Output 2400 ml   Net -1000 ml       Physical Exam   Alert and oriented x 3 NAD  Neck: no JVD  CV: RRR  Lungs: CTA bilaterally  Abd: soft, NT, ND   Ext: no lower extremity edema    Scheduled medications  carvedilol, 25 mg, oral, BID  cefTRIAXone, 1 g, intravenous, q24h  epoetin alejandra or biosimilar, 20,000 Units, subcutaneous, Once per day on Monday Wednesday Friday  [Held by provider] heparin (porcine), 5,000 Units, subcutaneous, q8h JOSE DE JESUS  heparin, 2,000 Units, intra-catheter, After Dialysis  heparin, 2,000 Units, intra-catheter, After Dialysis  insulin lispro, 0-10 Units, subcutaneous, Before meals & nightly  losartan, 50 mg, oral, Daily  pantoprazole, 40 mg, oral, Daily   Or  pantoprazole, 40 mg, intravenous, Daily      Continuous medications     PRN medications  PRN medications: acetaminophen **OR** acetaminophen **OR** acetaminophen, dextrose, dextrose, glucagon, glucagon, guaiFENesin, hydrALAZINE, melatonin, ondansetron, polyethylene glycol   Results from last 7 days   Lab Units 10/12/24  0603 10/10/24  0456 10/09/24  0510   SODIUM mmol/L 135*   < > 139   POTASSIUM mmol/L 3.8   < > 3.6   CHLORIDE mmol/L 102   < > 101   CO2 mmol/L 26   < > 30   BUN mg/dL 20   < > 23   CREATININE mg/dL 1.85*   < > 2.06*   CALCIUM mg/dL 9.0   < > 8.9   PROTEIN TOTAL g/dL  --   --  5.2*   BILIRUBIN TOTAL mg/dL  --   --  0.7   ALK PHOS U/L  --   --  129   ALT U/L  --   --  24   AST U/L  --   --  17   GLUCOSE mg/dL 122*   < > 106*    < > = values in this interval " not displayed.      Results from last 7 days   Lab Units 10/12/24  0603   MAGNESIUM mg/dL 1.67      Results from last 7 days   Lab Units 10/12/24  0603 10/11/24  0414 10/10/24  0456   WBC AUTO x10*3/uL 3.2* 2.9* 2.7*   HEMOGLOBIN g/dL 9.1* 9.0* 9.2*   HEMATOCRIT % 27.8* 26.2* 26.4*   PLATELETS AUTO x10*3/uL 144* 163 168        Assessment & Plan:      Patient is 79 y.o. female hypertension CAD, COPD, HFpEF, DM 2 who is admitted to hospital for generalized weakness and complicated UTI. Nephrology consulted in view of ESRD.     ESRD  -HD MWF at [facility]  -Access is right IJ TDC     Anemia of ESRD but also has myelodysplastic syndrome   -She is on Epogen 3 times a week at high doses  -Receives transfusions for hemoglobin less than 7 as needed  -Status post PRBC this admission     CKD-MBD  -Goal phosphorus less than 5.5.     Complicated UTI  Hypertension  -Accelerated    Recommendations:   -Okay for discharge from renal view but patient and family feel she is too weak to go home but also refusing rehab  -Will keep on Monday Wednesday Friday schedule while she is here  -Dose Epogen Monday Wednesday Friday  -BP still on the high side.  Will continue to challenge her on dialysis, can be done as outpatient as well  -Will add amlodipine 5 mg daily, she has been on this in the past    Please message me through doo chat with any questions or concerns.     Pinky Christie DO  10/12/2024  9:50 AM     [facility]    [address], [address]   [place] [address]  Office: [phone]

## 2024-10-12 NOTE — HH CARE COORDINATION
Home Care received a Referral for Physical Therapy and Occupational Therapy. We have processed the referral for a Start of Care on 10/14-10/15.     If you have any questions or concerns, please feel free to contact us at [phone]. Follow the prompts, enter your five digit zip code, and you will be directed to your care team on [facility].

## 2024-10-12 NOTE — PROGRESS NOTES
Occupational Therapy                 Therapy Communication Note    Patient Name: Tana Hargrove  MRN: [id number]  Department: [facility] 3 E  Room: [facility]-  Today's Date: 10/12/2024     Discipline: Occupational Therapy    Missed Visit Reason: Missed Visit Reason: Patient refused (RN stated pt is D/C this date.)    Missed Time: Attempt    Comment:

## 2024-10-12 NOTE — CARE PLAN
Problem: Fall/Injury  Goal: Not fall by end of shift  Outcome: Progressing  Goal: Be free from injury by end of the shift  Outcome: Progressing  Goal: Verbalize understanding of personal risk factors for fall in the hospital  Outcome: Progressing  Goal: Verbalize understanding of risk factor reduction measures to prevent injury from fall in the home  Outcome: Progressing  Goal: Use assistive devices by end of the shift  Outcome: Progressing  Goal: Pace activities to prevent fatigue by end of the shift  Outcome: Progressing     Problem: Skin  Goal: Decreased wound size/increased tissue granulation at next dressing change  Outcome: Progressing  Flowsheets (Taken 10/11/2024 2309)  Decreased wound size/increased tissue granulation at next dressing change: Promote sleep for wound healing  Goal: Participates in plan/prevention/treatment measures  Outcome: Progressing  Flowsheets (Taken 10/11/2024 2309)  Participates in plan/prevention/treatment measures:   Discuss with provider PT/OT consult   Elevate heels   Increase activity/out of bed for meals  Goal: Prevent/manage excess moisture  Outcome: Progressing  Flowsheets (Taken 10/11/2024 2309)  Prevent/manage excess moisture:   Cleanse incontinence/protect with barrier cream   Monitor for/manage infection if present  Goal: Prevent/minimize sheer/friction injuries  Outcome: Progressing  Flowsheets (Taken 10/11/2024 2309)  Prevent/minimize sheer/friction injuries: Increase activity/out of bed for meals  Goal: Promote/optimize nutrition  Outcome: Progressing  Flowsheets (Taken 10/11/2024 2309)  Promote/optimize nutrition:   Consume > 50% meals/supplements   Monitor/record intake including meals  Goal: Promote skin healing  Outcome: Progressing  Flowsheets (Taken 10/11/2024 2309)  Promote skin healing: Assess skin/pad under line(s)/device(s)     Problem: Pain - Adult  Goal: Verbalizes/displays adequate comfort level or baseline comfort level  Outcome: Progressing  Flowsheets  (Taken 10/11/2024 0100)  Verbalizes/displays adequate comfort level or baseline comfort level:   Encourage patient to monitor pain and request assistance   Assess pain using appropriate pain scale   Administer analgesics based on type and severity of pain and evaluate response   Implement non-pharmacological measures as appropriate and evaluate response     Problem: Safety - Adult  Goal: Free from fall injury  Outcome: Progressing     Problem: Discharge Planning  Goal: Discharge to home or other facility with appropriate resources  Outcome: Progressing     Problem: Chronic Conditions and Co-morbidities  Goal: Patient's chronic conditions and co-morbidity symptoms are monitored and maintained or improved  Outcome: Progressing     Problem: Pain  Goal: Takes deep breaths with improved pain control throughout the shift  Outcome: Progressing  Goal: Turns in bed with improved pain control throughout the shift  Outcome: Progressing  Goal: Walks with improved pain control throughout the shift  Outcome: Progressing  Goal: Performs ADL's with improved pain control throughout shift  Outcome: Progressing  Goal: Participates in PT with improved pain control throughout the shift  Outcome: Progressing  Goal: Free from opioid side effects throughout the shift  Outcome: Progressing  Goal: Free from acute confusion related to pain meds throughout the shift  Outcome: Progressing     Problem: Diabetes  Goal: Achieve decreasing blood glucose levels by end of shift  Outcome: Progressing  Goal: Increase stability of blood glucose readings by end of shift  Outcome: Progressing  Goal: Decrease in ketones present in urine by end of shift  Outcome: Progressing  Goal: Maintain electrolyte levels within acceptable range throughout shift  Outcome: Progressing  Goal: Maintain glucose levels >70mg/dl to <250mg/dl throughout shift  Outcome: Progressing  Goal: No changes in neurological exam by end of shift  Outcome: Progressing  Goal: Learn about  and adhere to nutrition recommendations by end of shift  Outcome: Progressing  Goal: Vital signs within normal range for age by end of shift  Outcome: Progressing  Goal: Increase self care and/or family involovement by end of shift  Outcome: Progressing  Goal: Receive DSME education by end of shift  Outcome: Progressing     Problem: Nutrition  Goal: Oral intake greater than 50%  Outcome: Progressing  Goal: Consume prescribed supplement  Outcome: Progressing  Goal: Adequate PO fluid intake  Outcome: Progressing  Goal: BG  mg/dL  Outcome: Progressing  Goal: Lab values WNL  Outcome: Progressing  Goal: Promote healing  Outcome: Progressing  Goal: Maintain stable weight  Outcome: Progressing   The patient's goals for the shift include      The clinical goals for the shift include patient will remain free from falls this shift.

## 2024-10-12 NOTE — DISCHARGE SUMMARY
Discharge Diagnosis  Complicated urinary tract infection    Issues Requiring Follow-Up  Anemia, UTI    This discharge took greater than 35 minutes.    Test Results Pending At Discharge  Pending Labs       Order Current Status    Extra Urine Gray Tube Collected (10/08/24 1637)    Urinalysis with Reflex Culture and Microscopic In process            Hospital Course   79 y.o. female with PMHx s/f ESRD on HD (MWF), hypertension, hyperlipidemia, CAD, HFpEF, COPD (no baseline O2), NIDDM2, paroxysmal atrial fibrillation not on anticoagulation secondary to MDS/anemia, chronic anemia of ESRD and myelodysplastic syndrome requiring MARILYNN and intermittent PRBC infusions, osteoarthritis, anxiety, depression, GERD presenting with generalized weakness x 3 weeks. She was hospitalized here on 09/20/24-09/23/24 for generalized weakness, UTI and discharged on Levaquin 50 mg. She finished her course of antibiotics, but still feeling generalized weakness. She went to dialysis on Monday, did 1.5 hours of treatment, did not finish it due to nausea and vomiting. She had decreased appetite since before the previous hospitalization and states it hasn't improved much. Her PCP had another UA done today and told her to come to ED for failed outpatient antibiotic treatment. She denies f/c, lightheadedness, headache, chest pain, sob, abd pain, leg swelling. Patient denies taking any other prescribed medications apart from antibiotics.     ED Course (Summary):   Vitals on presentation: 98.1F, 77 bpm, 20 RR, 199/72, 98% on RA  Labs: CMP-glucose 136, potassium 3.4, BUN/creatinine 21/2.04, alkaline phosphatase 185  Lactate 0.9  CBC-hemoglobin 6.6  Point-of-care urine-3+ protein, 1+ bilirubin, 3+ leukocytes  Imaging: None  Interventions: 1 unit of PRBCs, Rocephin 2 g        10/9:                Today patient seen following dialysis and is awake alert and interactive appropriately.  Voices no specific complaints and no acute events overnight.  Just  complains of weakness and fatigue.  Hemoglobin 6.0 today after 6.6 yesterday and given 2 more units packed RBC.  No evidence of active bleeding and she reports only having brown stools.  Urine cultures and blood culture remain pending.     10/10:            Today patient is awake alert and interactive appropriately in the bedside chair.  She does continue to complain of feeling weak and fatigued but not worse than yesterday.  Hemoglobin 9.2 today after transfusion yesterday.  Vital signs stable.  Urine culture reported as initially no significant growth and normal coleman.  WBC has not risen.     10/11:                Today patient remains awake alert and interactive appropriately.  Continues to complain of feeling weak and fatigued and not able to be discharged home today.  Discussed possible discharge to SNF but states she would refuse this.  AM-PAC today of 18.  Underwent ultrafiltration of 2 L today for improved blood pressure control.  If stable likely discharge home tomorrow.    10/12:                Today patient remains awake alert and interactive appropriately.  Seen in the bedside chair in the presence of family members.  Discussed with her that nephrology feels suitable for discharge home.  She describes that she feels weak and offered evaluation for discharge to SNF but she refuses this.  Discussed that she will have  home health care ordered.  Nephrology added amlodipine for better blood pressure control.  She will continue her usual dialysis regimen.        Review of Systems   Constitutional:  Positive for fatigue. Negative for chills and fever.   Respiratory:  Negative for cough, chest tightness, shortness of breath and wheezing.    Cardiovascular:  Negative for chest pain, palpitations and leg swelling.   Gastrointestinal: Negative for nausea and vomiting. Negative for abdominal pain, constipation and diarrhea.  Has only seen brown stool  Genitourinary:  Negative for flank pain and hematuria.    Musculoskeletal:  Negative for back pain and joint swelling.   Skin:  Negative for rash and wound.   Neurological:  Positive for weakness. Negative for tremors, syncope and headaches.          Acute on chronic anemia of ESRD  -Hgb 6.6 at admission, received 1 PRBCs in ED  -denies acute blood loss, melena, hematochezia, hemoptysis  -Recheck in a.m., continue to replenish as necessary  10/9: Hemoglobin 6.0 today and received 2 more units packed RBC without any obvious evidence of active bleeding.  Continue to monitor.  Hemodynamically stable.  10/10: Hemoglobin 9.2 today after transfusion yesterday.   10/11: Hemoglobin appearing reasonably stable.  No evidence of active bleeding.  Continue to monitor.  10/12: Hemoglobin continues to appear to gradually trend up.     Hypokalemia  -Potassium 3.4 admission, potassium chloride 20 mEq oral ordered  -Recheck in a.m. and replenish as necessary  10/12: Potassium normalized.     Complicated UTI, failed outpatient treatment  -Urine Cx on 10/02/24 shows resistant to ciprofloxacin  -rocephin  -urine culture pending  -does not meet sepsis criteria at admission  10/9: Urine culture pending.  10/10: Urine culture initially no significant growth and subsequently normal coleman.  10/12: Will continue Omnicef to complete course of treatment.     Generalized weakness  Ambulatory dysfunction  -likely 2/2 UTI with underlying chronic anemia  -PT/OT appreciated  10/10: Patient declined working with PT today.  10/12: Patient declines evaluation for SNF.  She will have  home health care.     ESRD on HD (MWF schedule)  -Nephrology consult  10/9: Nephrology continuing Monday Wednesday Friday schedule.     HTN, HLD  -pt denies taking any hypertensive meds  -ordered coreg and losartan that she should be taking at home, more education for compliance to keep her BP controlled is recommended  -not currently on any lipid lowering medication  10/12: Amlodipine added by nephrology for better blood  pressure control.     NIDDM-II   -Hold home oral meds for now   -Continue with SSI ACHS   -Accucheks, hypoglycemic protocol   -Monitor and adjust as needed      Paroxysmal A-fib  -Continue on Coreg for rate control  -not on anticoagulation secondary to MDS/anemia      Medication noncompliance  -increase adverse risks of future cardiovascular events  -educated on taking medications as directed          Pertinent Physical Exam At Time of Discharge  Physical Exam  Constitutional: Pale. Pleasant and cooperative. Laying in bed in no acute distress. Conversant.   Skin: Warm and dry; no obvious lesions, rashes, pallor, or jaundice. Good turgor.   Eyes: EOMI. Anicteric sclera.   ENT: Mucous membranes moist; no obvious injury or deformity appreciated.   Head and Neck: Normocephalic, atraumatic. ROM preserved. Trachea midline. No appreciable JVD.   Respiratory: Nonlabored on 2L. Lungs clear to auscultation bilaterally without obvious adventitious sounds. Chest rise is equal.  Cardiovascular: RRR. No gross murmur, gallop, or rub. Extremities are warm and well-perfused with good capillary refill (< 3 seconds). No chest wall tenderness.   GI: Abdomen soft, nontender, nondistended. No obvious organomegaly appreciated. Bowel sounds are present and normoactive.  : No CVA tenderness.   MSK: No gross abnormalities appreciated. No limitations to AROM/PROM appreciated.   Extremities: No cyanosis, edema, or clubbing evident. Neurovascularly intact.   Neuro: A&Ox3. CN 2-12 grossly intact. Able to respond to questions appropriately and clearly. No acute focal neurologic deficits appreciated.  Psych: Appropriate mood and behavior.  Home Medications     Medication List      START taking these medications     amLODIPine 5 mg tablet; Commonly known as: Norvasc; Take 1 tablet (5 mg)   by mouth once daily.; Start taking on: October 13, 2024   cefdinir 300 mg capsule; Commonly known as: Omnicef; Take 1 capsule (300   mg) by mouth 2 times a day  for 3 days.   losartan 50 mg tablet; Commonly known as: Cozaar; Take 1 tablet (50 mg)   by mouth once daily.   melatonin 3 mg tablet; Take 1 tablet (3 mg) by mouth as needed at   bedtime for sleep.     CONTINUE taking these medications     allopurinol 100 mg tablet; Commonly known as: Zyloprim   atorvastatin 10 mg tablet; Commonly known as: Lipitor; Take 1 tablet (10   mg) by mouth once daily.   carvedilol 25 mg tablet; Commonly known as: Coreg; Take 1 tablet (25 mg)   by mouth 2 times a day.   cholecalciferol 50 MCG (2000 UT) tablet; Commonly known as: Vitamin D-3   cyanocobalamin 1,000 mcg tablet; Commonly known as: Vitamin B-12   docusate sodium 100 mg capsule; Commonly known as: Colace   Eliquis 2.5 mg tablet; Generic drug: apixaban; Take 1 tablet (2.5 mg) by   mouth every 12 hours.   pioglitazone 15 mg tablet; Commonly known as: Actos; Take 1 tablet (15   mg) by mouth once daily.   sevelamer carbonate 800 mg tablet; Commonly known as: Renvela     STOP taking these medications     colchicine 0.6 mg tablet   SITagliptin 25 mg tablet       Outpatient Follow-Up  PCP, nephrologist, dialysis    Abhi Espana MD

## 2024-10-13 NOTE — DOCUMENTATION CLARIFICATION NOTE
"    PATIENT:               YVONNE BAEZ  ACCT #:                  [id number]  MRN:                       [id number]  :                       1945  ADMIT DATE:       10/8/2024 3:39 PM  DISCH DATE:        10/12/2024 2:49 PM  RESPONDING PROVIDER #:        [id number]          PROVIDER RESPONSE TEXT:    I agree with dietician diagnosis of moderate malnutrition on 10/9/2024    CDI QUERY TEXT:    Clarification    Question: Please further clarify this patient nutritional status as    When answering this query, please exercise your independent professional judgment. The fact that a question is being asked, does not imply that any particular answer is desired or expected.    The patient's clinical indicators include:  80 y/o female admitted since 10/8 for UTI. Clinical validation is requested for nutrition diagnosis without MD documentation.    10/9/2024 Nutrition note, April Koehler, RD: \"Moderate malnutrition related to acute disease or injury As Evidenced by: Reported less than 75% of estimated energy requirements since before 24, greater than 5% weight loss x 1 month\"    Clinical Indicators: poor appetite/intake, BMI 27.8, weight loss    Treatment: Nutrition consult, Nepro daily, intake/weight/lab monitoring daily    Risk Factors: ESRD, HFpEF, COPD, DM with foot ulcers  Options provided:  -- I agree with dietician diagnosis of moderate malnutrition on 10/9/2024  -- Other - I will add my own diagnosis  -- Refer to Clinical Documentation Reviewer    Query created by: Silvia Munoz on 10/12/2024 1:07 PM      Electronically signed by:  TEMO ZENDEJAS MD 10/13/2024 5:17 PM          "

## 2024-10-14 ENCOUNTER — PATIENT OUTREACH (OUTPATIENT)
Dept: PRIMARY CARE | Facility: CLINIC | Age: 79
End: 2024-10-14
Payer: MEDICARE

## 2024-10-14 NOTE — PROGRESS NOTES
30 day readmission   Discharge Facility: [facility]   Discharge Diagnosis: Complicated urinary tract infection   Admission Date: 10/9/24  Discharge Date: 10/12/24    PCP Appointment Date: no appointments, message to office  Specialist Appointment Date: needs nephrology  Hospital Encounter and Summary Linked: Yes  See discharge assessment below for further details    Medications  Medications reviewed with patient/caregiver?: Yes (10/14/2024 11:07 AM)  Is the patient having any side effects they believe may be caused by any medication additions or changes?: No (10/14/2024 11:07 AM)  Does the patient have all medications ordered at discharge?: Yes (10/14/2024 11:07 AM)  Medication Comments: START taking:  amLODIPine (Norvasc)  cefdinir (Omnicef)   losartan (Cozaar)   melatonin   STOP taking:  colchicine 0.6 mg tablet   SITagliptin 25 mg tablet (10/14/2024 11:07 AM)    Appointments  Does the patient have a primary care provider?: Yes (10/14/2024 11:07 AM)  Care Management Interventions: Advised patient to make appointment (10/14/2024 11:07 AM)  Care Management Interventions: Advised to schedule with specialist (10/14/2024 11:07 AM)    Self Management  What is the home health agency?:  (10/14/2024 11:07 AM)  Has home health visited the patient within 72 hours of discharge?: Call prior to 72 hours (10/14/2024 11:07 AM)  What Durable Medical Equipment (DME) was ordered?: n/a (10/14/2024 11:07 AM)    Patient Teaching  Does the patient have access to their discharge instructions?: Yes (10/14/2024 11:07 AM)  Care Management Interventions: Reviewed instructions with patient (10/14/2024 11:07 AM)  What is the patient's perception of their health status since discharge?: Improving (10/14/2024 11:07 AM)  Patient/Caregiver Education Comments: Patient reports feeling better than hospital presentation but knows it is going to take some time. Medication changes and follow up appointments reviewed. Contact information  provided. (10/14/2024 11:07 AM)

## 2024-10-15 ENCOUNTER — HOME CARE VISIT (OUTPATIENT)
Dept: HOME HEALTH SERVICES | Facility: HOME HEALTH | Age: 79
End: 2024-10-15
Payer: MEDICARE

## 2024-10-15 VITALS
TEMPERATURE: 97.8 F | HEART RATE: 78 BPM | OXYGEN SATURATION: 98 % | HEIGHT: 61 IN | BODY MASS INDEX: 27 KG/M2 | WEIGHT: 143 LBS | RESPIRATION RATE: 16 BRPM

## 2024-10-15 PROCEDURE — 169592 NO-PAY CLAIM PROCEDURE

## 2024-10-15 PROCEDURE — G0151 HHCP-SERV OF PT,EA 15 MIN: HCPCS | Mod: HHH

## 2024-10-15 ASSESSMENT — ACTIVITIES OF DAILY LIVING (ADL)
ENTERING_EXITING_HOME: SUPERVISION
AMBULATION ASSISTANCE ON FLAT SURFACES: 1
OASIS_M1830: 03
AMBULATION_DISTANCE/DURATION_TOLERATED: HOUSEHOLD

## 2024-10-15 ASSESSMENT — ENCOUNTER SYMPTOMS
OCCASIONAL FEELINGS OF UNSTEADINESS: 0
PERSON REPORTING PAIN: PATIENT
DENIES PAIN: 1

## 2024-10-17 ENCOUNTER — HOME CARE VISIT (OUTPATIENT)
Dept: HOME HEALTH SERVICES | Facility: HOME HEALTH | Age: 79
End: 2024-10-17
Payer: MEDICARE

## 2024-10-17 DIAGNOSIS — J44.9 COPD WITHOUT EXACERBATION (MULTI): ICD-10-CM

## 2024-10-17 DIAGNOSIS — E11.65 TYPE 2 DIABETES MELLITUS WITH HYPERGLYCEMIA, WITHOUT LONG-TERM CURRENT USE OF INSULIN: ICD-10-CM

## 2024-10-17 DIAGNOSIS — I50.32 CHRONIC DIASTOLIC HEART FAILURE OF UNKNOWN ETIOLOGY: Primary | ICD-10-CM

## 2024-10-17 PROCEDURE — G0152 HHCP-SERV OF OT,EA 15 MIN: HCPCS | Mod: HHH

## 2024-10-17 ASSESSMENT — ENCOUNTER SYMPTOMS
DENIES PAIN: 1
AGGRESSION WITHIN DEFINED LIMITS: 1
ANGER WITHIN DEFINED LIMITS: 1

## 2024-10-17 ASSESSMENT — ACTIVITIES OF DAILY LIVING (ADL)
BATHING ASSESSED: 1
TOILETING: 1
TRANSPORTATION: INDEPENDENT
GROOMING_CURRENT_FUNCTION: INDEPENDENT
HOUSEKEEPING ASSESSED: 1
FEEDING: INDEPENDENT
BATHING EQUIPMENT USED: SHOWER CHAIR, GRAB BAR
TOILETING: INDEPENDENT
DRESSING_UB_CURRENT_FUNCTION: INDEPENDENT
FEEDING ASSESSED: 1
AMBULATION ASSISTANCE: MODERATE ASSIST
BATHING_CURRENT_FUNCTION: INDEPENDENT
LAUNDRY: DEPENDENT
AMBULATION ASSISTANCE: 1
TRANSPORTATION ASSESSED: 1
SHOPPING: NEEDS ASSISTANCE
GROOMING ASSESSED: 1
SHOPPING ASSESSED: 1
LAUNDRY ASSESSED: 1
LIGHT HOUSEKEEPING: DEPENDENT
DRESSING_LB_CURRENT_FUNCTION: INDEPENDENT

## 2024-10-23 RX ORDER — HEPARIN 100 UNIT/ML
500 SYRINGE INTRAVENOUS AS NEEDED
OUTPATIENT
Start: 2024-10-23

## 2024-10-23 RX ORDER — HEPARIN SODIUM,PORCINE/PF 10 UNIT/ML
50 SYRINGE (ML) INTRAVENOUS AS NEEDED
OUTPATIENT
Start: 2024-10-23

## 2024-10-28 ENCOUNTER — APPOINTMENT (OUTPATIENT)
Dept: PRIMARY CARE | Facility: CLINIC | Age: 79
End: 2024-10-28
Payer: MEDICARE

## 2024-10-28 VITALS
HEART RATE: 57 BPM | TEMPERATURE: 97.5 F | HEIGHT: 61 IN | OXYGEN SATURATION: 93 % | SYSTOLIC BLOOD PRESSURE: 133 MMHG | DIASTOLIC BLOOD PRESSURE: 84 MMHG | RESPIRATION RATE: 16 BRPM | BODY MASS INDEX: 27.02 KG/M2

## 2024-10-28 DIAGNOSIS — D64.9 ANEMIA, UNSPECIFIED TYPE: ICD-10-CM

## 2024-10-28 DIAGNOSIS — I73.9 PERIPHERAL VASCULAR DISEASE, UNSPECIFIED (CMS-HCC): ICD-10-CM

## 2024-10-28 DIAGNOSIS — E78.5 HYPERLIPIDEMIA, UNSPECIFIED HYPERLIPIDEMIA TYPE: Chronic | ICD-10-CM

## 2024-10-28 DIAGNOSIS — Z87.440 HISTORY OF RECURRENT UTIS: ICD-10-CM

## 2024-10-28 DIAGNOSIS — Z99.2 ESRD (END STAGE RENAL DISEASE) ON DIALYSIS (MULTI): ICD-10-CM

## 2024-10-28 DIAGNOSIS — Z09 HOSPITAL DISCHARGE FOLLOW-UP: Primary | ICD-10-CM

## 2024-10-28 DIAGNOSIS — I10 HYPERTENSION, ESSENTIAL: Chronic | ICD-10-CM

## 2024-10-28 DIAGNOSIS — I48.91 ATRIAL FIBRILLATION WITH RAPID VENTRICULAR RESPONSE (MULTI): ICD-10-CM

## 2024-10-28 DIAGNOSIS — N18.6 ESRD (END STAGE RENAL DISEASE) ON DIALYSIS (MULTI): ICD-10-CM

## 2024-10-28 PROCEDURE — 99214 OFFICE O/P EST MOD 30 MIN: CPT | Performed by: STUDENT IN AN ORGANIZED HEALTH CARE EDUCATION/TRAINING PROGRAM

## 2024-10-28 RX ORDER — AMLODIPINE BESYLATE 10 MG/1
10 TABLET ORAL DAILY
COMMUNITY
Start: 2024-10-18

## 2024-10-28 RX ORDER — CARVEDILOL 25 MG/1
25 TABLET ORAL 2 TIMES DAILY
Qty: 60 TABLET | Refills: 3 | Status: SHIPPED | OUTPATIENT
Start: 2024-10-28 | End: 2025-02-25

## 2024-10-28 RX ORDER — LOSARTAN POTASSIUM 50 MG/1
50 TABLET ORAL DAILY
Qty: 30 TABLET | Refills: 3 | Status: SHIPPED | OUTPATIENT
Start: 2024-10-28 | End: 2025-02-25

## 2024-10-28 RX ORDER — ATORVASTATIN CALCIUM 10 MG/1
10 TABLET, FILM COATED ORAL DAILY
Qty: 30 TABLET | Refills: 3 | Status: SHIPPED | OUTPATIENT
Start: 2024-10-28

## 2024-10-28 SDOH — ECONOMIC STABILITY: FOOD INSECURITY: WITHIN THE PAST 12 MONTHS, THE FOOD YOU BOUGHT JUST DIDN'T LAST AND YOU DIDN'T HAVE MONEY TO GET MORE.: NEVER TRUE

## 2024-10-28 SDOH — ECONOMIC STABILITY: FOOD INSECURITY: WITHIN THE PAST 12 MONTHS, YOU WORRIED THAT YOUR FOOD WOULD RUN OUT BEFORE YOU GOT MONEY TO BUY MORE.: NEVER TRUE

## 2024-10-28 ASSESSMENT — ENCOUNTER SYMPTOMS
COLOR CHANGE: 0
DIARRHEA: 0
ABDOMINAL PAIN: 0
HEADACHES: 0
CONFUSION: 0
DIZZINESS: 0
SHORTNESS OF BREATH: 0
MUSCULOSKELETAL NEGATIVE: 1
UNEXPECTED WEIGHT CHANGE: 0
FATIGUE: 0
PALPITATIONS: 0
VOMITING: 0
FEVER: 0
WHEEZING: 0
CHILLS: 0
COUGH: 0
NAUSEA: 0
CONSTIPATION: 0

## 2024-10-28 ASSESSMENT — LIFESTYLE VARIABLES
HOW MANY STANDARD DRINKS CONTAINING ALCOHOL DO YOU HAVE ON A TYPICAL DAY: PATIENT DOES NOT DRINK
HOW OFTEN DO YOU HAVE A DRINK CONTAINING ALCOHOL: NEVER
HOW OFTEN DO YOU HAVE SIX OR MORE DRINKS ON ONE OCCASION: NEVER
AUDIT-C TOTAL SCORE: 0
SKIP TO QUESTIONS 9-10: 1

## 2024-10-30 ENCOUNTER — PATIENT OUTREACH (OUTPATIENT)
Dept: PRIMARY CARE | Facility: CLINIC | Age: 79
End: 2024-10-30
Payer: MEDICARE

## 2024-10-31 ENCOUNTER — TELEPHONE (OUTPATIENT)
Dept: PRIMARY CARE | Facility: CLINIC | Age: 79
End: 2024-10-31
Payer: MEDICARE

## 2024-10-31 DIAGNOSIS — D53.9 MACROCYTIC ANEMIA: Primary | ICD-10-CM

## 2024-11-07 ENCOUNTER — TELEPHONE (OUTPATIENT)
Dept: PRIMARY CARE | Facility: CLINIC | Age: 79
End: 2024-11-07

## 2024-11-07 ENCOUNTER — APPOINTMENT (OUTPATIENT)
Dept: PRIMARY CARE | Facility: CLINIC | Age: 79
End: 2024-11-07
Payer: MEDICARE

## 2024-11-07 ENCOUNTER — HOSPITAL ENCOUNTER (EMERGENCY)
Facility: HOSPITAL | Age: 79
Discharge: HOME | End: 2024-11-08
Attending: EMERGENCY MEDICINE
Payer: MEDICARE

## 2024-11-07 ENCOUNTER — LAB (OUTPATIENT)
Dept: LAB | Facility: LAB | Age: 79
End: 2024-11-07
Payer: MEDICARE

## 2024-11-07 VITALS
HEART RATE: 61 BPM | BODY MASS INDEX: 27.19 KG/M2 | TEMPERATURE: 97.1 F | OXYGEN SATURATION: 95 % | WEIGHT: 144 LBS | SYSTOLIC BLOOD PRESSURE: 105 MMHG | RESPIRATION RATE: 16 BRPM | HEIGHT: 61 IN | DIASTOLIC BLOOD PRESSURE: 63 MMHG

## 2024-11-07 DIAGNOSIS — D53.9 MACROCYTIC ANEMIA: ICD-10-CM

## 2024-11-07 DIAGNOSIS — R53.83 FATIGUE, UNSPECIFIED TYPE: ICD-10-CM

## 2024-11-07 DIAGNOSIS — Z71.89 ACP (ADVANCE CARE PLANNING): ICD-10-CM

## 2024-11-07 DIAGNOSIS — E78.5 HYPERLIPIDEMIA, UNSPECIFIED HYPERLIPIDEMIA TYPE: ICD-10-CM

## 2024-11-07 DIAGNOSIS — Z00.00 ROUTINE GENERAL MEDICAL EXAMINATION AT HEALTH CARE FACILITY: Primary | ICD-10-CM

## 2024-11-07 DIAGNOSIS — M79.18 MYOFASCIAL PAIN SYNDROME: ICD-10-CM

## 2024-11-07 DIAGNOSIS — D64.9 ACUTE ON CHRONIC ANEMIA: Primary | ICD-10-CM

## 2024-11-07 DIAGNOSIS — G57.93 NEUROPATHIC PAIN OF BOTH FEET: ICD-10-CM

## 2024-11-07 DIAGNOSIS — Z12.31 ENCOUNTER FOR SCREENING MAMMOGRAM FOR BREAST CANCER: ICD-10-CM

## 2024-11-07 DIAGNOSIS — I10 HYPERTENSION, ESSENTIAL: ICD-10-CM

## 2024-11-07 DIAGNOSIS — Z79.899 CONTROLLED SUBSTANCE AGREEMENT SIGNED: ICD-10-CM

## 2024-11-07 PROBLEM — N39.0 URINARY TRACT INFECTION, SITE NOT SPECIFIED: Status: RESOLVED | Noted: 2024-10-17 | Resolved: 2024-11-07

## 2024-11-07 LAB
ABO GROUP (TYPE) IN BLOOD: NORMAL
ABO GROUP (TYPE) IN BLOOD: NORMAL
AMPHETAMINES UR QL SCN: NORMAL
ANTIBODY SCREEN: NORMAL
ANTIBODY SCREEN: NORMAL
BARBITURATES UR QL SCN: NORMAL
BASO STIPL BLD QL SMEAR: PRESENT
BASOPHILS # BLD AUTO: 0.02 X10*3/UL (ref 0–0.1)
BASOPHILS NFR BLD AUTO: 0.7 %
BENZODIAZ UR QL SCN: NORMAL
BLOOD EXPIRATION DATE: NORMAL
BZE UR QL SCN: NORMAL
CANNABINOIDS UR QL SCN: NORMAL
DISPENSE STATUS: NORMAL
EOSINOPHIL # BLD AUTO: 0.06 X10*3/UL (ref 0–0.4)
EOSINOPHIL NFR BLD AUTO: 2.1 %
ERYTHROCYTE [DISTWIDTH] IN BLOOD BY AUTOMATED COUNT: 19.4 % (ref 11.5–14.5)
FENTANYL+NORFENTANYL UR QL SCN: NORMAL
HCT VFR BLD AUTO: 20.5 % (ref 36–46)
HCT VFR BLD AUTO: 21.2 % (ref 36–46)
HGB BLD-MCNC: 6.7 G/DL (ref 12–16)
HGB BLD-MCNC: 7 G/DL (ref 12–16)
HYPOCHROMIA BLD QL SMEAR: NORMAL
IMM GRANULOCYTES # BLD AUTO: 0.01 X10*3/UL (ref 0–0.5)
IMM GRANULOCYTES NFR BLD AUTO: 0.4 % (ref 0–0.9)
LYMPHOCYTES # BLD AUTO: 0.65 X10*3/UL (ref 0.8–3)
LYMPHOCYTES NFR BLD AUTO: 23 %
MCH RBC QN AUTO: 32.7 PG (ref 26–34)
MCHC RBC AUTO-ENTMCNC: 32.7 G/DL (ref 32–36)
MCV RBC AUTO: 100 FL (ref 80–100)
METHADONE UR QL SCN: NORMAL
MONOCYTES # BLD AUTO: 0.29 X10*3/UL (ref 0.05–0.8)
MONOCYTES NFR BLD AUTO: 10.2 %
NEUTROPHILS # BLD AUTO: 1.8 X10*3/UL (ref 1.6–5.5)
NEUTROPHILS NFR BLD AUTO: 63.6 %
NRBC BLD-RTO: 0 /100 WBCS (ref 0–0)
OPIATES UR QL SCN: NORMAL
OVALOCYTES BLD QL SMEAR: NORMAL
OXYCODONE+OXYMORPHONE UR QL SCN: NORMAL
PCP UR QL SCN: NORMAL
PLATELET # BLD AUTO: 237 X10*3/UL (ref 150–450)
POLYCHROMASIA BLD QL SMEAR: NORMAL
PRODUCT BLOOD TYPE: 9500
PRODUCT CODE: NORMAL
RBC # BLD AUTO: 2.05 X10*6/UL (ref 4–5.2)
RBC MORPH BLD: NORMAL
RH FACTOR (ANTIGEN D): NORMAL
RH FACTOR (ANTIGEN D): NORMAL
UNIT ABO: NORMAL
UNIT NUMBER: NORMAL
UNIT RH: NORMAL
UNIT VOLUME: 350
WBC # BLD AUTO: 2.8 X10*3/UL (ref 4.4–11.3)
XM INTEP: NORMAL

## 2024-11-07 PROCEDURE — 36415 COLL VENOUS BLD VENIPUNCTURE: CPT | Performed by: EMERGENCY MEDICINE

## 2024-11-07 PROCEDURE — 1036F TOBACCO NON-USER: CPT | Performed by: STUDENT IN AN ORGANIZED HEALTH CARE EDUCATION/TRAINING PROGRAM

## 2024-11-07 PROCEDURE — 36415 COLL VENOUS BLD VENIPUNCTURE: CPT

## 2024-11-07 PROCEDURE — 85014 HEMATOCRIT: CPT | Performed by: EMERGENCY MEDICINE

## 2024-11-07 PROCEDURE — 1170F FXNL STATUS ASSESSED: CPT | Performed by: STUDENT IN AN ORGANIZED HEALTH CARE EDUCATION/TRAINING PROGRAM

## 2024-11-07 PROCEDURE — 99285 EMERGENCY DEPT VISIT HI MDM: CPT | Mod: 25

## 2024-11-07 PROCEDURE — G0439 PPPS, SUBSEQ VISIT: HCPCS | Performed by: STUDENT IN AN ORGANIZED HEALTH CARE EDUCATION/TRAINING PROGRAM

## 2024-11-07 PROCEDURE — 3074F SYST BP LT 130 MM HG: CPT | Performed by: STUDENT IN AN ORGANIZED HEALTH CARE EDUCATION/TRAINING PROGRAM

## 2024-11-07 PROCEDURE — P9040 RBC LEUKOREDUCED IRRADIATED: HCPCS

## 2024-11-07 PROCEDURE — 86922 COMPATIBILITY TEST ANTIGLOB: CPT

## 2024-11-07 PROCEDURE — 1157F ADVNC CARE PLAN IN RCRD: CPT | Performed by: STUDENT IN AN ORGANIZED HEALTH CARE EDUCATION/TRAINING PROGRAM

## 2024-11-07 PROCEDURE — 1160F RVW MEDS BY RX/DR IN RCRD: CPT | Performed by: STUDENT IN AN ORGANIZED HEALTH CARE EDUCATION/TRAINING PROGRAM

## 2024-11-07 PROCEDURE — 1111F DSCHRG MED/CURRENT MED MERGE: CPT | Performed by: STUDENT IN AN ORGANIZED HEALTH CARE EDUCATION/TRAINING PROGRAM

## 2024-11-07 PROCEDURE — 86900 BLOOD TYPING SEROLOGIC ABO: CPT

## 2024-11-07 PROCEDURE — 86850 RBC ANTIBODY SCREEN: CPT

## 2024-11-07 PROCEDURE — 85025 COMPLETE CBC W/AUTO DIFF WBC: CPT

## 2024-11-07 PROCEDURE — 3078F DIAST BP <80 MM HG: CPT | Performed by: STUDENT IN AN ORGANIZED HEALTH CARE EDUCATION/TRAINING PROGRAM

## 2024-11-07 PROCEDURE — 80307 DRUG TEST PRSMV CHEM ANLYZR: CPT

## 2024-11-07 PROCEDURE — 86901 BLOOD TYPING SEROLOGIC RH(D): CPT

## 2024-11-07 PROCEDURE — 86901 BLOOD TYPING SEROLOGIC RH(D): CPT | Performed by: EMERGENCY MEDICINE

## 2024-11-07 PROCEDURE — 99214 OFFICE O/P EST MOD 30 MIN: CPT | Performed by: STUDENT IN AN ORGANIZED HEALTH CARE EDUCATION/TRAINING PROGRAM

## 2024-11-07 PROCEDURE — 36430 TRANSFUSION BLD/BLD COMPNT: CPT

## 2024-11-07 PROCEDURE — 99497 ADVNCD CARE PLAN 30 MIN: CPT | Performed by: STUDENT IN AN ORGANIZED HEALTH CARE EDUCATION/TRAINING PROGRAM

## 2024-11-07 PROCEDURE — 1159F MED LIST DOCD IN RCRD: CPT | Performed by: STUDENT IN AN ORGANIZED HEALTH CARE EDUCATION/TRAINING PROGRAM

## 2024-11-07 RX ORDER — PREGABALIN 100 MG/1
100 CAPSULE ORAL 2 TIMES DAILY
Qty: 60 CAPSULE | Refills: 0 | Status: SHIPPED | OUTPATIENT
Start: 2024-11-07

## 2024-11-07 SDOH — ECONOMIC STABILITY: FOOD INSECURITY: WITHIN THE PAST 12 MONTHS, YOU WORRIED THAT YOUR FOOD WOULD RUN OUT BEFORE YOU GOT MONEY TO BUY MORE.: NEVER TRUE

## 2024-11-07 SDOH — ECONOMIC STABILITY: FOOD INSECURITY: WITHIN THE PAST 12 MONTHS, THE FOOD YOU BOUGHT JUST DIDN'T LAST AND YOU DIDN'T HAVE MONEY TO GET MORE.: NEVER TRUE

## 2024-11-07 ASSESSMENT — LIFESTYLE VARIABLES
HAVE PEOPLE ANNOYED YOU BY CRITICIZING YOUR DRINKING: NO
HOW MANY STANDARD DRINKS CONTAINING ALCOHOL DO YOU HAVE ON A TYPICAL DAY: PATIENT DOES NOT DRINK
HOW OFTEN DO YOU HAVE SIX OR MORE DRINKS ON ONE OCCASION: NEVER
EVER HAD A DRINK FIRST THING IN THE MORNING TO STEADY YOUR NERVES TO GET RID OF A HANGOVER: NO
AUDIT-C TOTAL SCORE: 0
HOW OFTEN DO YOU HAVE A DRINK CONTAINING ALCOHOL: NEVER
TOTAL SCORE: 0
SKIP TO QUESTIONS 9-10: 1
HAVE YOU EVER FELT YOU SHOULD CUT DOWN ON YOUR DRINKING: NO
EVER FELT BAD OR GUILTY ABOUT YOUR DRINKING: NO

## 2024-11-07 ASSESSMENT — ACTIVITIES OF DAILY LIVING (ADL)
DOING_HOUSEWORK: INDEPENDENT
TAKING_MEDICATION: INDEPENDENT
DRESSING: INDEPENDENT
GROCERY_SHOPPING: INDEPENDENT
MANAGING_FINANCES: INDEPENDENT
BATHING: INDEPENDENT

## 2024-11-07 ASSESSMENT — ENCOUNTER SYMPTOMS
UNEXPECTED WEIGHT CHANGE: 0
LOSS OF SENSATION IN FEET: 0
OCCASIONAL FEELINGS OF UNSTEADINESS: 0
PALPITATIONS: 0
MUSCULOSKELETAL NEGATIVE: 1
NAUSEA: 0
ABDOMINAL PAIN: 0
SHORTNESS OF BREATH: 0
WHEEZING: 0
DIARRHEA: 0
LIGHT-HEADEDNESS: 1
FATIGUE: 1
CONFUSION: 0
COUGH: 0
CONSTIPATION: 0
CHILLS: 0
DIZZINESS: 0
COLOR CHANGE: 0
VOMITING: 0
DEPRESSION: 0
HEADACHES: 0
FEVER: 0

## 2024-11-07 ASSESSMENT — PAIN - FUNCTIONAL ASSESSMENT: PAIN_FUNCTIONAL_ASSESSMENT: 0-10

## 2024-11-07 ASSESSMENT — PAIN SCALES - GENERAL: PAINLEVEL_OUTOF10: 0 - NO PAIN

## 2024-11-07 NOTE — PATIENT INSTRUCTIONS
Ways to Help Prevent Falls at Home    Quick Tips   ? Ask for help if you need it. Most people want to help!   ? Get up slowly after sitting or laying down   ? Wear a medical alert device or keep cell phone in your pocket   ? Use night lights, especially areas near a bathroom   ? Keep the items you use often within reach on a small stool or end table   ? Use an assistive device such as walker or cane, as directed by provider/physical therapy   ? Use a non-slip mat and grab bars in your bathroom. Look for home health sections for best options     Other Areas to Focus On   ? Exercise and nutrition: Regular exercise or taking a falls prevention class are great ways improve strength and balance. Don’t forget to stay hydrated and bring a snack!   ? Medicine side effects: Some medicines can make you sleepy or dizzy, which could cause a fall. Ask your healthcare provider about the side effects your medicines could cause. Be sure to let them know if you take any vitamins or supplements as well.   ? Tripping hazards: Remove items you could trip on, such as loose mats, rugs, cords, and clutter. Wear closed toe shoes with rubber soles.   ? Health and wellness: Get regular checkups with your healthcare provider, plus routine vision and hearing screenings. Talk with your healthcare provider about:   o Your medicines and the possible side effects - bring them in a bag if that is easier!   o Problems with balance or feeling dizzy   o Ways to promote bone health, such as Vitamin D and calcium supplements   o Questions or concerns about falling     *Ask your healthcare team if you have questions     ©[facility], 2022

## 2024-11-07 NOTE — ED PROVIDER NOTES
Department of Emergency Medicine   ED  Provider Note  Admit Date/RoomTime: 11/7/2024  3:32 PM  ED Room: AC03/03                  History of Present Illness:   Tana Hargrove is a 79 y.o. female presenting to the ED for Anemia, beginning last week or two.  The complaint has been persistent, moderate in severity, and worsened by nothing.  Patient has myelodysplastic disorder.  She has history of hypertension and hyperlipidemia.  Stage IV chronic kidney disease.  COPD.  Chronic anemia due to her chronic kidney disease and myelodysplastic disorder.  Patient denies any black tarry or bloody stools.  She is had blood transfusions in the past.  She saw her doctor yesterday was complaining of some generalized weakness and fatigue.  They did outpatient blood work that shows her hemoglobin to be 6.7 yesterday.  Her doctor's office called her and told her to come to the ER for blood transfusion.  She denies any chest pain.  No significant shortness of breath.  Just complains of fatigue.  No nausea vomiting diarrhea.  No fever or chills.      Review of Systems:   Pertinent positives and review of systems as noted above.  Remaining 10 review of systems is negative or noncontributory to today's episode of care.  Review of Systems   A complete review of systems is otherwise negative except as noted above    --------------------------------------------- PAST HISTORY ---------------------------------------------  Past Medical History:  has a past medical history of Abnormal levels of other serum enzymes, Acute kidney failure, Anemia, CKD (chronic kidney disease), COPD (chronic obstructive pulmonary disease) (Multi), Coronary artery disease, Disease of blood and blood-forming organs, unspecified, HLD (hyperlipidemia), Hypertension, MDS (myelodysplastic syndrome) (Multi), Personal history of other diseases of the musculoskeletal system and connective tissue, Personal history of other specified conditions, Personal history of other  specified conditions, Type 2 diabetes mellitus, and Urinary tract infection, site not specified (10/17/2024).    She has no past medical history of Adverse effect of anesthesia, Arthritis, Asthma, Awareness under anesthesia, CHF (congestive heart failure), Delayed emergence from general anesthesia, Disease of thyroid gland, Hard to intubate, History of transfusion, Malignant hyperthermia, PONV (postoperative nausea and vomiting), Pseudocholinesterase deficiency, Sickle cell anemia (Multi), Spinal headache, or Stroke (Multi).    Past Surgical History:  has a past surgical history that includes Other surgical history (12/13/2019); Other surgical history (12/13/2019); Other surgical history (Bilateral, 12/13/2019); Other surgical history (Left, 12/13/2019); Other surgical history (12/13/2019); Other surgical history (12/13/2019); Other surgical history (Right, 12/13/2019); Other surgical history (04/16/2021); MR angio head wo IV contrast (11/20/2020); MR angio neck wo IV contrast (11/20/2020); Breast biopsy (Left); Hysterectomy; Breast lumpectomy; Appendectomy; Colonoscopy; Oophorectomy; Joint replacement; and Cholecystectomy (06/06/2024).    Social History:  reports that she has never smoked. She has never been exposed to tobacco smoke. She has never used smokeless tobacco. She reports that she does not currently use alcohol. She reports that she does not use drugs.    Family History: family history includes Heart attack in her father; Heart disease in her mother; Hodgkin's lymphoma in her son. Unless otherwise noted, family history is non contributory    Patient's Medications   New Prescriptions    No medications on file   Previous Medications    ALLOPURINOL (ZYLOPRIM) 100 MG TABLET    Take 1 tablet (100 mg) by mouth every 12 hours.    AMLODIPINE (NORVASC) 10 MG TABLET    Take 1 tablet (10 mg) by mouth once daily.    ATORVASTATIN (LIPITOR) 10 MG TABLET    Take 1 tablet (10 mg) by mouth once daily.    CARVEDILOL  (COREG) 25 MG TABLET    Take 1 tablet (25 mg) by mouth 2 times a day.    CHOLECALCIFEROL (VITAMIN D-3) 50 MCG (2000 UT) TABLET    Take 1 tablet (2,000 Units) by mouth once daily.    CYANOCOBALAMIN (VITAMIN B-12) 1,000 MCG TABLET    Take 1 tablet (1,000 mcg) by mouth once daily.    LOSARTAN (COZAAR) 50 MG TABLET    Take 1 tablet (50 mg) by mouth once daily.    MELATONIN 3 MG TABLET    Take 1 tablet (3 mg) by mouth as needed at bedtime for sleep.    PIOGLITAZONE (ACTOS) 15 MG TABLET    Take 1 tablet (15 mg) by mouth once daily.    PREGABALIN (LYRICA) 100 MG CAPSULE    Take 1 capsule (100 mg) by mouth 2 times a day.    SEVELAMER CARBONATE (RENVELA) 800 MG TABLET    Take by mouth.   Modified Medications    No medications on file   Discontinued Medications    No medications on file      The patient’s home medications have been reviewed.    Allergies: Codeine, Hydrocodone, Hydrocodone-acetaminophen, Tramadol, Piperacillin-tazobactam, Adhesive tape-silicones, and Meperidine    -------------------------------------------------- RESULTS -------------------------------------------------  All laboratory and radiology results have been personally reviewed by myself   LABS:  Labs Reviewed   HEMOGLOBIN AND HEMATOCRIT, BLOOD - Abnormal       Result Value    Hemoglobin 7.0 (*)     Hematocrit 21.2 (*)    TYPE AND SCREEN    ABO TYPE A      Rh TYPE POS      ANTIBODY SCREEN NEG     PREPARE RBC    PRODUCT CODE [id number]      Unit Number [id number]      Unit ABO O      Unit RH NEG      XM INTEP COMP      Dispense Status TR      Blood Expiration Date 11/14/2024 11:59:00 PM EST      PRODUCT BLOOD TYPE 9500      UNIT VOLUME 350     PREPARE RBC         RADIOLOGY:  Interpreted by Radiologist.  No orders to display       Encounter Date: 10/08/24   ECG 12 lead   Result Value    Ventricular Rate 87    Atrial Rate 87    MA Interval 180    QRS Duration 94    QT Interval 404    QTC Calculation(Bazett) 486    P Axis 65    R Axis 15    T Axis 43  "   QRS Count 14    Q Onset 249    T Offset 451    QTC Fredericia 457    Narrative    Sinus rhythm  Borderline prolonged QT interval    See ED provider note for full interpretation and clinical correlation  Confirmed by Terri Bermudez ([id number]) on 10/9/2024 5:53:05 PM     ------------------------- NURSING NOTES AND VITALS REVIEWED ---------------------------   The nursing notes within the ED encounter and vital signs as below have been reviewed.   /73   Pulse 81   Temp 36.5 °C (97.7 °F) (Temporal)   Resp 20   Ht 1.549 m (5' 1\")   Wt 65.3 kg (144 lb)   SpO2 95%   BMI 27.21 kg/m²   Oxygen Saturation Interpretation: Normal      ---------------------------------------------------PHYSICAL EXAM--------------------------------------  Physical Exam  Vitals and nursing note reviewed.   Constitutional:       General: She is not in acute distress.     Appearance: She is well-developed. She is not ill-appearing or toxic-appearing.   HENT:      Head: Normocephalic and atraumatic.      Nose: Nose normal.      Mouth/Throat:      Mouth: Mucous membranes are moist.      Pharynx: Oropharynx is clear. No oropharyngeal exudate or posterior oropharyngeal erythema.   Eyes:      General: No scleral icterus.     Extraocular Movements: Extraocular movements intact.      Conjunctiva/sclera: Conjunctivae normal.      Pupils: Pupils are equal, round, and reactive to light.   Cardiovascular:      Rate and Rhythm: Normal rate and regular rhythm.      Pulses: Normal pulses.      Heart sounds: Normal heart sounds. No murmur heard.  Pulmonary:      Effort: Pulmonary effort is normal. No respiratory distress.      Breath sounds: Normal breath sounds. No wheezing, rhonchi or rales.   Abdominal:      General: Bowel sounds are normal. There is no distension.      Palpations: Abdomen is soft.      Tenderness: There is no abdominal tenderness. There is no right CVA tenderness, left CVA tenderness, guarding or rebound.      Hernia: No " hernia is present.   Musculoskeletal:         General: No swelling, tenderness, deformity or signs of injury. Normal range of motion.      Cervical back: Normal range of motion and neck supple. No rigidity or tenderness.      Right lower leg: No edema.      Left lower leg: No edema.   Lymphadenopathy:      Cervical: No cervical adenopathy.   Skin:     General: Skin is warm and dry.      Capillary Refill: Capillary refill takes less than 2 seconds.      Coloration: Skin is pale. Skin is not jaundiced.      Findings: No lesion or rash.   Neurological:      Mental Status: She is alert and oriented to person, place, and time.      Cranial Nerves: No cranial nerve deficit.      Sensory: No sensory deficit.      Motor: No weakness.   Psychiatric:         Mood and Affect: Mood normal.            Procedures  Blood Transfusion  ------------------------------ ED COURSE/MEDICAL DECISION MAKING----------------------    Medical Decision Making:   Patient was seen and evaluated by me.  Patient was sent in for blood transfusion.  Patient was agreeable.  I explained risk versus benefits and she was agreeable.  Consent was signed.    ED Course as of 11/07/24 2125   Thu Nov 07, 2024 1733 EKG 3:50 PM interpreted by me.  Normal sinus rhythm 74 bpm.  Normal axis.   ms QRS 88 ms  ms.  No acute ST-T change.  No STEMI. [EC]      ED Course User Index  [EC] Jonathan Krishnamurthy,          Diagnoses as of 11/07/24 2125   Acute on chronic anemia      Counseling:   The emergency provider has spoken with the patient and discussed today’s results, in addition to providing specific details for the plan of care and counseling regarding the diagnosis and prognosis.  Questions are answered at this time and they are agreeable with the plan.      --------------------------------- IMPRESSION AND DISPOSITION ---------------------------------        IMPRESSION  1. Acute on chronic anemia        DISPOSITION  Disposition: signed out to Dr AKASH Sanders  at 2100  Patient condition is fair      Billing Provider Critical Care Time: 0 minutes     Jonathan Krishnamurthy, DO  11/07/24 2124       Jonathan Krishnamurthy, DO  11/07/24 2125

## 2024-11-07 NOTE — ASSESSMENT & PLAN NOTE
Orders:    Drug Screen, Urine With Reflex to Confirmation; Future    pregabalin (Lyrica) 100 mg capsule; Take 1 capsule (100 mg) by mouth 2 times a day.

## 2024-11-07 NOTE — PROGRESS NOTES
Subjective   Patient ID: Tana Hargrove is a 79 y.o. female who presents for Medicare Annual Wellness Visit Subsequent and Follow-up (Pt is feeling very weak today.  c/o feet keeping her up at night.  Pt would like to discuss a script for pregabalin ).    Subjective   Reason for Visit: Tana Hargrove is an 79 y.o. female here for a Medicare Wellness visit and FU. Reports she is feeling very weak for the last wk; had a bld work order but wasn't able to do as she wasn't aware of the order; per chart she was called on 10/31/24.   Per pt had Hgb checked at  center yesterday and it was 7.3. Reports she is so weak today as compared to yesterday.   Reports she is having burning sensation on her feet; wakes up from sleep. Prev was taking Lyrica 100 mg bid with lot of help; would like to go back on the meds.     Past Medical, Surgical, and Family History reviewed and updated in chart.    Reviewed all medications by prescribing practitioner or clinical pharmacist (such as prescriptions, OTCs, herbal therapies and supplements) and documented in the medical record.    HPI  #HM stuffs  Screening tests:  - Mammogram (age 40-74): UTD, wants the test  - Bone scan (age 65 & older): hold for now   - Colonoscopy (age 45-75): declined, n/a   - Lipid profile: has active order from cards     Primary prevention:  - Flu shot: UTD  - RSV (age > 60): UTD  - COVID vaccines: declined   - Tdap shot: UTD  - Shingles shot (age >50): due for 2nd dose   - Prevnar 20: UTD   - Statin (age 40-65 or high risk): taking     Counseling:   - ETOH (age>18):  none   - Smoking: none     Patient Care Team:  Carlos Higgins MD as PCP - General (Family Medicine)  Carlos Higgins MD as PCP - [employer]age PCP  Azeem Medina MD as Consulting Physician (Hematology and Oncology)  Gerri Boykin, GE as Care Manager (Case Management)     Review of Systems   Constitutional:  Positive for fatigue. Negative for chills, fever and unexpected weight change.  "  HENT: Negative.     Respiratory:  Negative for cough, shortness of breath and wheezing.    Cardiovascular:  Negative for chest pain, palpitations and leg swelling.   Gastrointestinal:  Negative for abdominal pain, constipation, diarrhea, nausea and vomiting.   Musculoskeletal: Negative.    Skin:  Negative for color change and rash.   Neurological:  Positive for light-headedness. Negative for dizziness and headaches.   Psychiatric/Behavioral:  Negative for behavioral problems and confusion.        Objective   Vitals:  /63 (BP Location: Right arm, Patient Position: Sitting, BP Cuff Size: Adult)   Pulse 61   Temp 36.2 °C (97.1 °F) (Temporal)   Resp 16   Ht 1.549 m (5' 1\")   Wt 65.3 kg (144 lb)   SpO2 95%   BMI 27.21 kg/m²       Physical Exam  Vitals and nursing note reviewed.   Constitutional:       General: She is not in acute distress.     Appearance: Normal appearance. She is not ill-appearing or toxic-appearing.      Comments: Sitting on chair, holding walker    HENT:      Right Ear: Tympanic membrane normal.      Left Ear: Tympanic membrane normal.   Eyes:      Extraocular Movements: Extraocular movements intact.      Pupils: Pupils are equal, round, and reactive to light.   Cardiovascular:      Rate and Rhythm: Normal rate and regular rhythm.      Pulses: Normal pulses.      Heart sounds: Normal heart sounds.   Pulmonary:      Effort: Pulmonary effort is normal.      Breath sounds: Normal breath sounds.   Abdominal:      General: Abdomen is flat. Bowel sounds are normal.      Palpations: Abdomen is soft.   Musculoskeletal:         General: Normal range of motion.   Neurological:      General: No focal deficit present.      Mental Status: She is alert.      Cranial Nerves: No cranial nerve deficit.      Sensory: No sensory deficit.      Motor: No weakness.   Psychiatric:         Mood and Affect: Mood normal.         Behavior: Behavior normal.       Assessment & Plan  Macrocytic anemia    Orders:    " CBC and Auto Differential; Future    Hyperlipidemia, unspecified hyperlipidemia type         Hypertension, essential         Fatigue, unspecified type  Likely from having low Hgb; bld work resulted showed Hgb at 6.7; instructed pt to seek ER care for bld transfusion.   Orders:    CBC and Auto Differential; Future    Controlled substance agreement signed         Myofascial pain syndrome    Orders:    Drug Screen, Urine With Reflex to Confirmation; Future    pregabalin (Lyrica) 100 mg capsule; Take 1 capsule (100 mg) by mouth 2 times a day.    Neuropathic pain of both feet  Restarted back on Lyrica 100 mg bid. If makign her drowsy then cut down to once daily. CSA signed & UDS obtained.   Orders:    Drug Screen, Urine With Reflex to Confirmation; Future    pregabalin (Lyrica) 100 mg capsule; Take 1 capsule (100 mg) by mouth 2 times a day.    Routine general medical examination at health care facility  # [facility] wellness #  visit   - Discussed ACP with pt; will work on POA/living will paper work   - Immunization per emr  - Age appropriate screenings as listed above in [facility] summary   - refer [facility] summary above for detail  - BW ordered as listed in emr    Orders:    1 Year Follow Up In Primary Care - Wellness Exam; Future    Encounter for screening mammogram for breast cancer    Orders:    BI mammo bilateral screening tomosynthesis; Future    ACP (advance care planning)           Advance Directives Discussion  16 - 20 minutes were spent discussing Advanced Care Planning (including a Living Will, Medical Power Of , as well as specific end of life choices and/or directives). The details of that discussion were documented in Advanced Directives Discussion section of the medical record.      Rtc 3 mo for JOJO Higgins MD   [facility]  Patient was identified as a fall risk. Risk prevention instructions provided.

## 2024-11-07 NOTE — ED TRIAGE NOTES
Pt. Arrived to the E via EMS for c/o abnormal labs. Pt. Went to her doctors the other day for weakness. They took blood work yesterday and her daughter called her today due to low hemoglobin. Pt. Expresses increased fatigue. Denies any bloody stools, chest pain, or SOB. States she did have some abdominal pain earlier today.

## 2024-11-08 VITALS
WEIGHT: 144 LBS | RESPIRATION RATE: 10 BRPM | OXYGEN SATURATION: 97 % | TEMPERATURE: 97.8 F | HEART RATE: 73 BPM | DIASTOLIC BLOOD PRESSURE: 95 MMHG | SYSTOLIC BLOOD PRESSURE: 186 MMHG | HEIGHT: 61 IN | BODY MASS INDEX: 27.19 KG/M2

## 2024-11-08 LAB
BLOOD EXPIRATION DATE: NORMAL
DISPENSE STATUS: NORMAL
PRODUCT BLOOD TYPE: 9500
PRODUCT CODE: NORMAL
UNIT ABO: NORMAL
UNIT NUMBER: NORMAL
UNIT RH: NORMAL
UNIT VOLUME: 350
XM INTEP: NORMAL

## 2024-11-08 PROCEDURE — 2500000001 HC RX 250 WO HCPCS SELF ADMINISTERED DRUGS (ALT 637 FOR MEDICARE OP): Performed by: EMERGENCY MEDICINE

## 2024-11-08 PROCEDURE — 86905 BLOOD TYPING RBC ANTIGENS: CPT

## 2024-11-08 RX ORDER — CARVEDILOL 6.25 MG/1
25 TABLET ORAL ONCE
Status: COMPLETED | OUTPATIENT
Start: 2024-11-08 | End: 2024-11-08

## 2024-11-08 NOTE — PROGRESS NOTES
Emergency Medicine Transition of Care Note.    I received Tana Hargrove in signout from Dr. Krishnamurthy.  Please see the previous ED provider note for all HPI, PE and MDM up to the time of signout. This is in addition to the primary record.    In brief Tana Hargrove is an 79 y.o. female presenting for   Chief Complaint   Patient presents with    Abnormal Labs     At the time of signout we were awaiting: blood transfusion and likely discharge    ED Course as of 11/07/24 2120   u Nov 07, 2024   1733 EKG 3:50 PM interpreted by me.  Normal sinus rhythm 74 bpm.  Normal axis.   ms QRS 88 ms  ms.  No acute ST-T change.  No STEMI. [EC]      ED Course User Index  [EC] Jonathan Krishnamurthy DO         Diagnoses as of 11/07/24 2120   Acute on chronic anemia       Medical Decision Making  Sent in from her doctor for blood transfusion. Patient was given 1u for Hgb of 7.0 (6.7 outpatient today).     Patient transfused without incident. Discharged in stable condition with instructions on follow up and return precautions.     Final diagnoses:   [D64.9] Acute on chronic anemia           Procedure  Procedures    Tabitha Sanders DO

## 2024-11-08 NOTE — DISCHARGE INSTRUCTIONS
Continue regular medications.  Follow-up with your primary care physician this coming Monday or Tuesday for recheck.  Return if acutely worsening worrisome symptoms.

## 2024-11-11 ENCOUNTER — TELEPHONE (OUTPATIENT)
Dept: PRIMARY CARE | Facility: CLINIC | Age: 79
End: 2024-11-11
Payer: MEDICARE

## 2024-11-11 NOTE — TELEPHONE ENCOUNTER
Patient was released from ED 11/07/24.  There are no 40 minute appointments for her.  She said you sent her to the ED because of her blood count to begin with.  She wants to know if you want to see her or do another blood test.

## 2024-11-13 ENCOUNTER — LAB (OUTPATIENT)
Dept: LAB | Facility: LAB | Age: 79
End: 2024-11-13
Payer: MEDICARE

## 2024-11-13 ENCOUNTER — TELEPHONE (OUTPATIENT)
Dept: PRIMARY CARE | Facility: CLINIC | Age: 79
End: 2024-11-13

## 2024-11-13 DIAGNOSIS — D53.9 MACROCYTIC ANEMIA: Primary | ICD-10-CM

## 2024-11-13 DIAGNOSIS — D53.9 MACROCYTIC ANEMIA: ICD-10-CM

## 2024-11-13 LAB
ERYTHROCYTE [DISTWIDTH] IN BLOOD BY AUTOMATED COUNT: 17.8 % (ref 11.5–14.5)
HCT VFR BLD AUTO: 25.3 % (ref 36–46)
HGB BLD-MCNC: 8 G/DL (ref 12–16)
MCH RBC QN AUTO: 31.6 PG (ref 26–34)
MCHC RBC AUTO-ENTMCNC: 31.6 G/DL (ref 32–36)
MCV RBC AUTO: 100 FL (ref 80–100)
NRBC BLD-RTO: 0 /100 WBCS (ref 0–0)
PLATELET # BLD AUTO: 220 X10*3/UL (ref 150–450)
RBC # BLD AUTO: 2.53 X10*6/UL (ref 4–5.2)
WBC # BLD AUTO: 3.2 X10*3/UL (ref 4.4–11.3)

## 2024-11-13 PROCEDURE — 36415 COLL VENOUS BLD VENIPUNCTURE: CPT

## 2024-11-13 PROCEDURE — 85027 COMPLETE CBC AUTOMATED: CPT

## 2024-11-13 ASSESSMENT — ACTIVITIES OF DAILY LIVING (ADL)
HOME_HEALTH_OASIS: 01
OASIS_M1830: 02

## 2024-11-29 ENCOUNTER — LAB REQUISITION (OUTPATIENT)
Dept: LAB | Facility: HOSPITAL | Age: 79
End: 2024-11-29
Payer: MEDICARE

## 2024-11-29 ENCOUNTER — HOSPITAL ENCOUNTER (EMERGENCY)
Facility: HOSPITAL | Age: 79
Discharge: HOME | End: 2024-11-29
Attending: EMERGENCY MEDICINE
Payer: MEDICARE

## 2024-11-29 VITALS
BODY MASS INDEX: 28.32 KG/M2 | OXYGEN SATURATION: 99 % | SYSTOLIC BLOOD PRESSURE: 158 MMHG | HEIGHT: 61 IN | DIASTOLIC BLOOD PRESSURE: 62 MMHG | WEIGHT: 150 LBS | TEMPERATURE: 98 F | RESPIRATION RATE: 16 BRPM | HEART RATE: 73 BPM

## 2024-11-29 DIAGNOSIS — D64.9 ANEMIA, UNSPECIFIED TYPE: Primary | ICD-10-CM

## 2024-11-29 DIAGNOSIS — N18.6 END STAGE RENAL DISEASE (MULTI): ICD-10-CM

## 2024-11-29 LAB
ABO GROUP (TYPE) IN BLOOD: NORMAL
ANION GAP SERPL CALC-SCNC: 10 MMOL/L (ref 10–20)
ANTIBODY SCREEN: NORMAL
APTT PPP: 29 SECONDS (ref 27–38)
BASO STIPL BLD QL SMEAR: PRESENT
BASOPHILS # BLD AUTO: 0.02 X10*3/UL (ref 0–0.1)
BASOPHILS NFR BLD AUTO: 0.3 %
BLOOD EXPIRATION DATE: NORMAL
BUN SERPL-MCNC: 11 MG/DL (ref 6–23)
BURR CELLS BLD QL SMEAR: NORMAL
CALCIUM SERPL-MCNC: 8.3 MG/DL (ref 8.6–10.3)
CHLORIDE SERPL-SCNC: 98 MMOL/L (ref 98–107)
CO2 SERPL-SCNC: 34 MMOL/L (ref 21–32)
CREAT SERPL-MCNC: 1.39 MG/DL (ref 0.5–1.05)
DISPENSE STATUS: NORMAL
EGFRCR SERPLBLD CKD-EPI 2021: 38 ML/MIN/1.73M*2
EOSINOPHIL # BLD AUTO: 0.22 X10*3/UL (ref 0–0.4)
EOSINOPHIL NFR BLD AUTO: 3.7 %
ERYTHROCYTE [DISTWIDTH] IN BLOOD BY AUTOMATED COUNT: 21.2 % (ref 11.5–14.5)
ERYTHROCYTE [DISTWIDTH] IN BLOOD BY AUTOMATED COUNT: 21.3 % (ref 11.5–14.5)
GLUCOSE SERPL-MCNC: 121 MG/DL (ref 74–99)
HCT VFR BLD AUTO: 19.2 % (ref 36–46)
HCT VFR BLD AUTO: 20.1 % (ref 36–46)
HGB BLD-MCNC: 6.1 G/DL (ref 12–16)
HGB BLD-MCNC: 6.5 G/DL (ref 12–16)
HYPOCHROMIA BLD QL SMEAR: NORMAL
IMM GRANULOCYTES # BLD AUTO: 0.02 X10*3/UL (ref 0–0.5)
IMM GRANULOCYTES NFR BLD AUTO: 0.3 % (ref 0–0.9)
INR PPP: 1.1 (ref 0.9–1.1)
LYMPHOCYTES # BLD AUTO: 1.28 X10*3/UL (ref 0.8–3)
LYMPHOCYTES NFR BLD AUTO: 21.5 %
MCH RBC QN AUTO: 32.6 PG (ref 26–34)
MCH RBC QN AUTO: 33.2 PG (ref 26–34)
MCHC RBC AUTO-ENTMCNC: 31.8 G/DL (ref 32–36)
MCHC RBC AUTO-ENTMCNC: 32.3 G/DL (ref 32–36)
MCV RBC AUTO: 103 FL (ref 80–100)
MCV RBC AUTO: 103 FL (ref 80–100)
MONOCYTES # BLD AUTO: 0.52 X10*3/UL (ref 0.05–0.8)
MONOCYTES NFR BLD AUTO: 8.7 %
NEUTROPHILS # BLD AUTO: 3.89 X10*3/UL (ref 1.6–5.5)
NEUTROPHILS NFR BLD AUTO: 65.5 %
NRBC BLD-RTO: 0.4 /100 WBCS (ref 0–0)
NRBC BLD-RTO: 0.5 /100 WBCS (ref 0–0)
OVALOCYTES BLD QL SMEAR: NORMAL
PLATELET # BLD AUTO: 227 X10*3/UL (ref 150–450)
PLATELET # BLD AUTO: 230 X10*3/UL (ref 150–450)
POLYCHROMASIA BLD QL SMEAR: NORMAL
POTASSIUM SERPL-SCNC: 3.5 MMOL/L (ref 3.5–5.3)
PRODUCT BLOOD TYPE: 5100
PRODUCT CODE: NORMAL
PROTHROMBIN TIME: 12.1 SECONDS (ref 9.8–12.8)
RBC # BLD AUTO: 1.87 X10*6/UL (ref 4–5.2)
RBC # BLD AUTO: 1.96 X10*6/UL (ref 4–5.2)
RBC MORPH BLD: NORMAL
RH FACTOR (ANTIGEN D): NORMAL
SODIUM SERPL-SCNC: 138 MMOL/L (ref 136–145)
TARGETS BLD QL SMEAR: NORMAL
UNIT ABO: NORMAL
UNIT NUMBER: NORMAL
UNIT RH: NORMAL
UNIT VOLUME: 350
WBC # BLD AUTO: 4.5 X10*3/UL (ref 4.4–11.3)
WBC # BLD AUTO: 6 X10*3/UL (ref 4.4–11.3)
XM INTEP: NORMAL

## 2024-11-29 PROCEDURE — 85027 COMPLETE CBC AUTOMATED: CPT

## 2024-11-29 PROCEDURE — 86922 COMPATIBILITY TEST ANTIGLOB: CPT

## 2024-11-29 PROCEDURE — 85025 COMPLETE CBC W/AUTO DIFF WBC: CPT | Performed by: EMERGENCY MEDICINE

## 2024-11-29 PROCEDURE — 99285 EMERGENCY DEPT VISIT HI MDM: CPT | Mod: 25

## 2024-11-29 PROCEDURE — 85610 PROTHROMBIN TIME: CPT | Performed by: EMERGENCY MEDICINE

## 2024-11-29 PROCEDURE — 86902 BLOOD TYPE ANTIGEN DONOR EA: CPT

## 2024-11-29 PROCEDURE — 36430 TRANSFUSION BLD/BLD COMPNT: CPT

## 2024-11-29 PROCEDURE — 80048 BASIC METABOLIC PNL TOTAL CA: CPT | Performed by: EMERGENCY MEDICINE

## 2024-11-29 PROCEDURE — 99291 CRITICAL CARE FIRST HOUR: CPT | Performed by: EMERGENCY MEDICINE

## 2024-11-29 PROCEDURE — P9040 RBC LEUKOREDUCED IRRADIATED: HCPCS

## 2024-11-29 PROCEDURE — 86901 BLOOD TYPING SEROLOGIC RH(D): CPT | Performed by: EMERGENCY MEDICINE

## 2024-11-29 PROCEDURE — 36415 COLL VENOUS BLD VENIPUNCTURE: CPT | Performed by: EMERGENCY MEDICINE

## 2024-11-29 ASSESSMENT — LIFESTYLE VARIABLES
EVER HAD A DRINK FIRST THING IN THE MORNING TO STEADY YOUR NERVES TO GET RID OF A HANGOVER: NO
EVER FELT BAD OR GUILTY ABOUT YOUR DRINKING: NO
HAVE YOU EVER FELT YOU SHOULD CUT DOWN ON YOUR DRINKING: NO
HAVE PEOPLE ANNOYED YOU BY CRITICIZING YOUR DRINKING: NO
TOTAL SCORE: 0

## 2024-11-29 ASSESSMENT — PAIN SCALES - GENERAL: PAINLEVEL_OUTOF10: 0 - NO PAIN

## 2024-11-29 ASSESSMENT — PAIN - FUNCTIONAL ASSESSMENT: PAIN_FUNCTIONAL_ASSESSMENT: 0-10

## 2024-11-29 NOTE — ED PROVIDER NOTES
HPI   Chief Complaint   Patient presents with   • Low Hemoglobin       HPI  HISTORY OF PRESENT ILLNESS:  Patient is a 79-year-old female with history significant end-stage renal disease on hemodialysis, A-fib not anticoagulated, meylodysplastic syndrome presented to the emergency department for abnormal labs.  The patient states that she was undergoing dialysis today.  She had a hemoglobin send that showed it was 6.1.  Patient states that she not noticed any bleeding.  No symptoms of anemia such as shortness of breath, leg swelling, palpitations, lightheadedness.    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   Eyes: PERRLA, EOMs intact,  Conjunctiva pink with no redness or exudates.    HENT: Normocephalic, atraumatic. Nares patent   Neck: Supple. Trachea at midline.   Pulmonary: Lung sounds are clear bilaterally.  There is no rales, rhonchi, or wheezing.  Cardiac: Regular rate and rhythm, no rubs, murmurs, or gallops. No JVD,   Abdomen: Abdomen is soft, nontender, and nondistended.  No palpable organomegaly.  No rebound or guarding.  No CVA tenderness. Nonsurgical abdomen  Genitourinary: Exam deferred.  Musculoskeletal: no edema, pain, cyanosis, or deformity in extremities. Pulses full and equal.   Neurological:  Cranial nerves are grossly intact, grossly normal sensation, no weakness, no focal findings identified.    __________________________________________________________  MEDICAL DECISION  MAKING:    Patient Presents emergency department for anemia.  Patient has history of recurrent anemia, without any signs of bleeding.  She most recently had been transfused on November 7.  Patient denies any anemia symptoms.  She also denies any signs of bleeding, though she states that she has never had any overt bleeding.  Vital signs here are stable.  Labs obtained shows confirmation that she is anemic to 6.5.  She does not need emergent dialysis at this time.  The patient was consented for a unit of blood.  After completion, she was feeling fine.  She was advised to follow-up with her heme oncologist.  I did discuss return precautions.  She verbalized understanding, agreed plan, the patient was discharged home.      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 Monday Wednesday Friday hypertension hyperlipidemia, CAD, heart failure preserved ejection fraction, COPD on no baseline oxygen, diabetic, paroxysmal A-fib not currently anticoagulated, mild dysplastic syndrome with recurrent anemia requiring transfusion    External Records Reviewed: I reviewed recent and relevant outside records including: Patient discharge summary from October 12, 2024     [facility]  Emergency Medicine      Patient History   Past Medical History:   Diagnosis Date   • Abnormal levels of other serum enzymes     Elevated liver enzymes   • Acute kidney failure    • Anemia    • CKD (chronic kidney disease)    • COPD (chronic obstructive pulmonary disease) (Multi)    • Coronary artery disease    • Disease of blood and blood-forming organs, unspecified     Bone marrow disorder   • HLD (hyperlipidemia)    • Hypertension    • MDS (myelodysplastic syndrome) (Multi)    • Personal history of other diseases of the musculoskeletal system and connective tissue     History of muscle pain   • Personal history of other specified conditions     History of insomnia   • Personal history of other  specified conditions     History of edema   • Type 2 diabetes mellitus    • Urinary tract infection, site not specified 10/17/2024     Past Surgical History:   Procedure Laterality Date   • APPENDECTOMY     • BREAST BIOPSY Left     left excisional   • BREAST LUMPECTOMY     • CHOLECYSTECTOMY  06/06/2024    partial cholecystectomy   • COLONOSCOPY     • HYSTERECTOMY     • JOINT REPLACEMENT     • MR HEAD ANGIO WO IV CONTRAST  11/20/2020    MR HEAD ANGIO WO IV CONTRAST 11/20/2020 [facility] CLINICAL LEGACY   • MR NECK ANGIO WO IV CONTRAST  11/20/2020    MR NECK ANGIO WO IV CONTRAST 11/20/2020 [facility] CLINICAL LEGACY   • OOPHORECTOMY     • OTHER SURGICAL HISTORY  12/13/2019    Oophorectomy bilateral   • OTHER SURGICAL HISTORY  12/13/2019    Tubal ligation   • OTHER SURGICAL HISTORY Bilateral 12/13/2019    Knee replacement   • OTHER SURGICAL HISTORY Left 12/13/2019    Shoulder surgery   • OTHER SURGICAL HISTORY  12/13/2019    Hysterectomy   • OTHER SURGICAL HISTORY  12/13/2019    Lumpectomy   • OTHER SURGICAL HISTORY Right 12/13/2019    Foot surgery   • OTHER SURGICAL HISTORY  04/16/2021    Back surgery     Family History   Problem Relation Name Age of Onset   • Heart disease Mother     • Heart attack Father     • Hodgkin's lymphoma Son       Social History     Tobacco Use   • Smoking status: Never     Passive exposure: Never   • Smokeless tobacco: Never   Vaping Use   • Vaping status: Never Used   Substance Use Topics   • Alcohol use: Not Currently   • Drug use: Never       Physical Exam   ED Triage Vitals [11/29/24 1601]   Temperature Heart Rate Respirations BP   36.5 °C (97.7 °F) 69 18 139/56      Pulse Ox Temp Source Heart Rate Source Patient Position   95 % Temporal Monitor --      BP Location FiO2 (%)     Left arm --       Physical Exam      ED Course & MDM   Diagnoses as of 11/29/24 2337   Anemia, unspecified type                 No data recorded     [place] Coma Scale Score: 15 (11/29/24 1602 : Lupe Simon RN)                            Medical Decision Making      Procedure  Critical Care    Performed by: Rc Marinelli DO  Authorized by: Rc Marinelli DO    Critical care provider statement:     Critical care time (minutes):  30    Critical care time was exclusive of:  Separately billable procedures and treating other patients and teaching time    Critical care was necessary to treat or prevent imminent or life-threatening deterioration of the following conditions: anemia.    Critical care was time spent personally by me on the following activities:  Blood draw for specimens, development of treatment plan with patient or surrogate, evaluation of patient's response to treatment, examination of patient, ordering and review of laboratory studies, ordering and performing treatments and interventions, ordering and review of radiographic studies, re-evaluation of patient's condition, review of old charts and obtaining history from patient or surrogate       Rc Marinelli DO  11/29/24 5572

## 2024-11-29 NOTE — ED TRIAGE NOTES
Pt to ed via private vehicle from dialysis c/o low hemoglobin. Pt goes to dialysis Monday, Wednesday, Friday, and was there today when she was told her hemoglobin is 6.1. pt states has had trouble with hemoglobin in the past, has had blood transfusions. Pt states feels weak after dialysis but denies any other symptoms. Pt states has not noticed any abnormal bleeding, states in the past has not had any either. Denies SOB, dizziness.

## 2024-11-30 LAB
BLOOD EXPIRATION DATE: NORMAL
DISPENSE STATUS: NORMAL
PRODUCT BLOOD TYPE: 600
PRODUCT CODE: NORMAL
UNIT ABO: NORMAL
UNIT NUMBER: NORMAL
UNIT RH: NORMAL
UNIT VOLUME: 350
XM INTEP: NORMAL

## 2024-12-04 ENCOUNTER — PATIENT OUTREACH (OUTPATIENT)
Dept: PRIMARY CARE | Facility: CLINIC | Age: 79
End: 2024-12-04
Payer: MEDICARE

## 2024-12-09 ENCOUNTER — TELEPHONE (OUTPATIENT)
Dept: HEMATOLOGY/ONCOLOGY | Facility: CLINIC | Age: 79
End: 2024-12-09
Payer: MEDICARE

## 2024-12-09 DIAGNOSIS — N18.6 ANEMIA DUE TO CHRONIC KIDNEY DISEASE, ON CHRONIC DIALYSIS (MULTI): Primary | Chronic | ICD-10-CM

## 2024-12-09 DIAGNOSIS — Z99.2 ANEMIA DUE TO CHRONIC KIDNEY DISEASE, ON CHRONIC DIALYSIS (MULTI): Primary | Chronic | ICD-10-CM

## 2024-12-09 DIAGNOSIS — D63.1 ANEMIA DUE TO CHRONIC KIDNEY DISEASE, ON CHRONIC DIALYSIS (MULTI): Primary | Chronic | ICD-10-CM

## 2024-12-09 NOTE — TELEPHONE ENCOUNTER
Tana called the office to schedule a follow up  with An nMarie because she keeps requiring blood transfusions. Last hgb-6.8  Spoke with Ann Marie she stated Nephrology has been handling her procrit injection, transfusions. Ann Marie stated she would order some lab work including Iron studies for next week then have Tana scheduled for a phone visit on a Tues.  Phone call to Taan, no answer, left a msg for a return call  
Asthma    Hypertension    Mental retardation    Multiple sclerosis    Seizure disorder

## 2024-12-13 ENCOUNTER — LAB (OUTPATIENT)
Dept: LAB | Facility: LAB | Age: 79
End: 2024-12-13
Payer: MEDICARE

## 2024-12-13 DIAGNOSIS — Z99.2 ANEMIA DUE TO CHRONIC KIDNEY DISEASE, ON CHRONIC DIALYSIS (MULTI): Chronic | ICD-10-CM

## 2024-12-13 DIAGNOSIS — N18.6 ANEMIA DUE TO CHRONIC KIDNEY DISEASE, ON CHRONIC DIALYSIS (MULTI): Chronic | ICD-10-CM

## 2024-12-13 DIAGNOSIS — D63.1 ANEMIA DUE TO CHRONIC KIDNEY DISEASE, ON CHRONIC DIALYSIS (MULTI): Chronic | ICD-10-CM

## 2024-12-13 LAB
FERRITIN SERPL-MCNC: 3103 NG/ML (ref 8–150)
FOLATE SERPL-MCNC: 16.9 NG/ML
IRON SATN MFR SERPL: ABNORMAL %
IRON SERPL-MCNC: 195 UG/DL (ref 35–150)
TIBC SERPL-MCNC: ABNORMAL UG/DL
UIBC SERPL-MCNC: <55 UG/DL (ref 110–370)
VIT B12 SERPL-MCNC: 611 PG/ML (ref 211–911)

## 2024-12-13 PROCEDURE — 36415 COLL VENOUS BLD VENIPUNCTURE: CPT

## 2024-12-13 PROCEDURE — 83550 IRON BINDING TEST: CPT

## 2024-12-13 PROCEDURE — 83540 ASSAY OF IRON: CPT

## 2024-12-13 PROCEDURE — 82746 ASSAY OF FOLIC ACID SERUM: CPT

## 2024-12-13 PROCEDURE — 82607 VITAMIN B-12: CPT

## 2024-12-13 PROCEDURE — 82728 ASSAY OF FERRITIN: CPT

## 2024-12-17 ENCOUNTER — TELEPHONE (OUTPATIENT)
Dept: HEMATOLOGY/ONCOLOGY | Facility: CLINIC | Age: 79
End: 2024-12-17
Payer: MEDICARE

## 2024-12-17 ENCOUNTER — TELEPHONE (OUTPATIENT)
Dept: PRIMARY CARE | Facility: CLINIC | Age: 79
End: 2024-12-17
Payer: MEDICARE

## 2024-12-17 DIAGNOSIS — G57.93 NEUROPATHIC PAIN OF BOTH FEET: ICD-10-CM

## 2024-12-17 DIAGNOSIS — M79.18 MYOFASCIAL PAIN SYNDROME: ICD-10-CM

## 2024-12-17 RX ORDER — PREGABALIN 100 MG/1
100 CAPSULE ORAL 2 TIMES DAILY
Qty: 60 CAPSULE | Refills: 0 | Status: SHIPPED | OUTPATIENT
Start: 2024-12-17

## 2024-12-17 NOTE — TELEPHONE ENCOUNTER
Patient called in requesting a refill on her Pregabalin. Patient would like this to go to [facility] in [place].

## 2024-12-17 NOTE — TELEPHONE ENCOUNTER
Pt called.  She was told by the MD she sees in dialysis that her blood keeps disappearing and they wanted to be seen in the office soon.  Currently pt is scheduled with Ann Marie on 2/3.  Pt did state that they wanted her to see Dr. Medina, but I know Ann Marie is her provider here.

## 2024-12-17 NOTE — TELEPHONE ENCOUNTER
LOV: 11/7/24  NOV: 02/04/2025  Cancellation/No Show: 9/23/24, 7/24/24, 7/10/24, 6/26/24, 5/13/24, 5/10/24  CSA: 4/16/24  UDS: 11/7/24

## 2024-12-17 NOTE — TELEPHONE ENCOUNTER
Please schedule her with Dr Medina if that is what they are requesting. Thank you.   Please see additional information documented regarding transfusions.

## 2024-12-18 ENCOUNTER — DOCUMENTATION (OUTPATIENT)
Dept: HEMATOLOGY/ONCOLOGY | Facility: CLINIC | Age: 79
End: 2024-12-18
Payer: MEDICARE

## 2024-12-18 DIAGNOSIS — D50.9 IRON DEFICIENCY ANEMIA, UNSPECIFIED IRON DEFICIENCY ANEMIA TYPE: ICD-10-CM

## 2024-12-18 DIAGNOSIS — D64.9 ANEMIA, UNSPECIFIED TYPE: ICD-10-CM

## 2024-12-18 NOTE — PROGRESS NOTES
Tana called our office because she has been anemic and has been required to go to er when she needs a transfusion. Spoke with Ann Marie who stated she would order weekly labs cbc, t&s and order blood as needed until Tana can be seen by dr. Medina.  Sspoke with Ellie at the [facility] infusion center. They are unable to transfuse Tana next week, the week of 12/23  If Tana can come to our [facility] office on 12/27 for lab work she could be transfused on 12/30 at noon  Lab work entered. Left a msg for Tana to call this RN back to discuss plan  Spoke with Tana this am to explain that the infusion center can not administer blood next week d/t staffing. Instructed her to come to [facility] office on Friday 12/27 for cbc, t&s. She will receive blood on Monday if she is <7.0  Tana verbalized understanding with tanisha lopez    Spoke with Tana on 12/30 instructing her not to go to the infusion center since she did not have lab work on 12/27.   Instructed her to come to the office on 1/6 before HD for blood work. Tana is on the [facility] schedule for 1/7 for a transfusion if needed. Tana voiced understanding with tanisha booth

## 2024-12-23 ENCOUNTER — HOSPITAL ENCOUNTER (EMERGENCY)
Facility: HOSPITAL | Age: 79
Discharge: HOME | End: 2024-12-23
Attending: STUDENT IN AN ORGANIZED HEALTH CARE EDUCATION/TRAINING PROGRAM
Payer: MEDICARE

## 2024-12-23 VITALS
BODY MASS INDEX: 27.38 KG/M2 | SYSTOLIC BLOOD PRESSURE: 134 MMHG | WEIGHT: 145 LBS | HEIGHT: 61 IN | DIASTOLIC BLOOD PRESSURE: 52 MMHG | HEART RATE: 80 BPM | RESPIRATION RATE: 18 BRPM | TEMPERATURE: 97.3 F | OXYGEN SATURATION: 94 %

## 2024-12-23 DIAGNOSIS — D64.9 ANEMIA, UNSPECIFIED TYPE: Primary | ICD-10-CM

## 2024-12-23 LAB
ABO GROUP (TYPE) IN BLOOD: NORMAL
ANION GAP SERPL CALC-SCNC: 14 MMOL/L (ref 10–20)
ANTIBODY SCREEN: NORMAL
BASOPHILS # BLD AUTO: 0.03 X10*3/UL (ref 0–0.1)
BASOPHILS NFR BLD AUTO: 0.8 %
BLOOD EXPIRATION DATE: NORMAL
BUN SERPL-MCNC: 24 MG/DL (ref 6–23)
CALCIUM SERPL-MCNC: 9.3 MG/DL (ref 8.6–10.3)
CHLORIDE SERPL-SCNC: 98 MMOL/L (ref 98–107)
CO2 SERPL-SCNC: 30 MMOL/L (ref 21–32)
CREAT SERPL-MCNC: 2.37 MG/DL (ref 0.5–1.05)
DISPENSE STATUS: NORMAL
EGFRCR SERPLBLD CKD-EPI 2021: 20 ML/MIN/1.73M*2
EOSINOPHIL # BLD AUTO: 0.06 X10*3/UL (ref 0–0.4)
EOSINOPHIL NFR BLD AUTO: 1.6 %
ERYTHROCYTE [DISTWIDTH] IN BLOOD BY AUTOMATED COUNT: 22.6 % (ref 11.5–14.5)
GLUCOSE SERPL-MCNC: 195 MG/DL (ref 74–99)
HCT VFR BLD AUTO: 21.2 % (ref 36–46)
HGB BLD-MCNC: 6.8 G/DL (ref 12–16)
HYPOCHROMIA BLD QL SMEAR: NORMAL
IMM GRANULOCYTES # BLD AUTO: 0.02 X10*3/UL (ref 0–0.5)
IMM GRANULOCYTES NFR BLD AUTO: 0.5 % (ref 0–0.9)
LYMPHOCYTES # BLD AUTO: 0.81 X10*3/UL (ref 0.8–3)
LYMPHOCYTES NFR BLD AUTO: 21.6 %
MCH RBC QN AUTO: 33.8 PG (ref 26–34)
MCHC RBC AUTO-ENTMCNC: 32.1 G/DL (ref 32–36)
MCV RBC AUTO: 106 FL (ref 80–100)
MONOCYTES # BLD AUTO: 0.38 X10*3/UL (ref 0.05–0.8)
MONOCYTES NFR BLD AUTO: 10.1 %
NEUTROPHILS # BLD AUTO: 2.45 X10*3/UL (ref 1.6–5.5)
NEUTROPHILS NFR BLD AUTO: 65.4 %
NRBC BLD-RTO: 0 /100 WBCS (ref 0–0)
OVALOCYTES BLD QL SMEAR: NORMAL
PLATELET # BLD AUTO: 239 X10*3/UL (ref 150–450)
POTASSIUM SERPL-SCNC: 3.7 MMOL/L (ref 3.5–5.3)
PRODUCT BLOOD TYPE: 600
PRODUCT CODE: NORMAL
RBC # BLD AUTO: 2.01 X10*6/UL (ref 4–5.2)
RBC MORPH BLD: NORMAL
RH FACTOR (ANTIGEN D): NORMAL
SODIUM SERPL-SCNC: 138 MMOL/L (ref 136–145)
TARGETS BLD QL SMEAR: NORMAL
UNIT ABO: NORMAL
UNIT NUMBER: NORMAL
UNIT RH: NORMAL
UNIT VOLUME: 350
WBC # BLD AUTO: 3.8 X10*3/UL (ref 4.4–11.3)
XM INTEP: NORMAL

## 2024-12-23 PROCEDURE — 36430 TRANSFUSION BLD/BLD COMPNT: CPT

## 2024-12-23 PROCEDURE — 86901 BLOOD TYPING SEROLOGIC RH(D): CPT | Performed by: NURSE PRACTITIONER

## 2024-12-23 PROCEDURE — 86902 BLOOD TYPE ANTIGEN DONOR EA: CPT

## 2024-12-23 PROCEDURE — 86922 COMPATIBILITY TEST ANTIGLOB: CPT

## 2024-12-23 PROCEDURE — 80048 BASIC METABOLIC PNL TOTAL CA: CPT | Performed by: NURSE PRACTITIONER

## 2024-12-23 PROCEDURE — 99285 EMERGENCY DEPT VISIT HI MDM: CPT | Mod: 25 | Performed by: STUDENT IN AN ORGANIZED HEALTH CARE EDUCATION/TRAINING PROGRAM

## 2024-12-23 PROCEDURE — P9040 RBC LEUKOREDUCED IRRADIATED: HCPCS

## 2024-12-23 PROCEDURE — 85025 COMPLETE CBC W/AUTO DIFF WBC: CPT | Performed by: NURSE PRACTITIONER

## 2024-12-23 PROCEDURE — 36415 COLL VENOUS BLD VENIPUNCTURE: CPT | Performed by: NURSE PRACTITIONER

## 2024-12-23 ASSESSMENT — LIFESTYLE VARIABLES
HAVE YOU EVER FELT YOU SHOULD CUT DOWN ON YOUR DRINKING: NO
EVER HAD A DRINK FIRST THING IN THE MORNING TO STEADY YOUR NERVES TO GET RID OF A HANGOVER: NO
TOTAL SCORE: 0
HAVE PEOPLE ANNOYED YOU BY CRITICIZING YOUR DRINKING: NO
EVER FELT BAD OR GUILTY ABOUT YOUR DRINKING: NO

## 2024-12-23 ASSESSMENT — PAIN DESCRIPTION - LOCATION: LOCATION: ABDOMEN

## 2024-12-23 ASSESSMENT — PAIN DESCRIPTION - PAIN TYPE: TYPE: ACUTE PAIN

## 2024-12-23 ASSESSMENT — PAIN SCALES - GENERAL: PAINLEVEL_OUTOF10: 10 - WORST POSSIBLE PAIN

## 2024-12-23 ASSESSMENT — PAIN - FUNCTIONAL ASSESSMENT: PAIN_FUNCTIONAL_ASSESSMENT: 0-10

## 2024-12-23 NOTE — ED PROVIDER NOTES
Chief Complaint   Patient presents with    abnormal labs, low hemoglobin       HPI       79 year old female presents to the Emergency Department today complaining of feeling weak this am for which she feels as though her blood counts are low. Has an underlying history of myeloproliferative disease as the cause to such. Denies any associated fever, chills, headache, neck pain, chest pain, shortness of breath, abdominal pain, nausea, vomiting, diarrhea, constipation, hematemesis, hematochezia, melena, or urinary symptoms.       History provided by:  Patient             Patient History   Past Medical History:   Diagnosis Date    Abnormal levels of other serum enzymes     Elevated liver enzymes    Acute kidney failure     Anemia     CKD (chronic kidney disease)     COPD (chronic obstructive pulmonary disease) (Multi)     Coronary artery disease     Disease of blood and blood-forming organs, unspecified     Bone marrow disorder    HLD (hyperlipidemia)     Hypertension     MDS (myelodysplastic syndrome) (Multi)     Personal history of other diseases of the musculoskeletal system and connective tissue     History of muscle pain    Personal history of other specified conditions     History of insomnia    Personal history of other specified conditions     History of edema    Type 2 diabetes mellitus     Urinary tract infection, site not specified 10/17/2024     Past Surgical History:   Procedure Laterality Date    APPENDECTOMY      BREAST BIOPSY Left     left excisional    BREAST LUMPECTOMY      CHOLECYSTECTOMY  06/06/2024    partial cholecystectomy    COLONOSCOPY      HYSTERECTOMY      JOINT REPLACEMENT      MR HEAD ANGIO WO IV CONTRAST  11/20/2020    MR HEAD ANGIO WO IV CONTRAST 11/20/2020 [facility] CLINICAL LEGACY    MR NECK ANGIO WO IV CONTRAST  11/20/2020    MR NECK ANGIO WO IV CONTRAST 11/20/2020 [facility] CL[facility]ICAL LEGACY    OOPHORECTOMY      OTHER SURGICAL HISTORY  12/13/2019    Oophorectomy bilateral    OTHER SURGICAL HISTORY   12/13/2019    Tubal ligation    OTHER SURGICAL HISTORY Bilateral 12/13/2019    Knee replacement    OTHER SURGICAL HISTORY Left 12/13/2019    Shoulder surgery    OTHER SURGICAL HISTORY  12/13/2019    Hysterectomy    OTHER SURGICAL HISTORY  12/13/2019    Lumpectomy    OTHER SURGICAL HISTORY Right 12/13/2019    Foot surgery    OTHER SURGICAL HISTORY  04/16/2021    Back surgery     Family History   Problem Relation Name Age of Onset    Heart disease Mother      Heart attack Father      Hodgkin's lymphoma Son       Social History     Tobacco Use    Smoking status: Never     Passive exposure: Never    Smokeless tobacco: Never   Vaping Use    Vaping status: Never Used   Substance Use Topics    Alcohol use: Not Currently    Drug use: Never           Physical Exam  Constitutional:       Appearance: Normal appearance.   HENT:      Head: Normocephalic.      Right Ear: Tympanic membrane, ear canal and external ear normal.      Left Ear: Tympanic membrane, ear canal and external ear normal.      Nose: Nose normal.      Mouth/Throat:      Mouth: Mucous membranes are moist.      Pharynx: Oropharynx is clear. No oropharyngeal exudate or posterior oropharyngeal erythema.   Eyes:      Conjunctiva/sclera: Conjunctivae normal.      Pupils: Pupils are equal, round, and reactive to light.      Comments: Pale conjunctiva   Cardiovascular:      Rate and Rhythm: Normal rate and regular rhythm.      Pulses:           Radial pulses are 3+ on the right side and 3+ on the left side.        Dorsalis pedis pulses are 3+ on the right side and 3+ on the left side.      Heart sounds: Normal heart sounds. No murmur heard.     No friction rub. No gallop.   Pulmonary:      Effort: Pulmonary effort is normal. No respiratory distress.      Breath sounds: Normal breath sounds. No wheezing, rhonchi or rales.   Abdominal:      General: Abdomen is flat. Bowel sounds are normal.      Palpations: Abdomen is soft.      Tenderness: There is no abdominal  tenderness. There is no right CVA tenderness, left CVA tenderness, guarding or rebound. Negative signs include Luna's sign and McBurney's sign.   Musculoskeletal:         General: No swelling or deformity.      Cervical back: Full passive range of motion without pain.      Right lower leg: No edema.      Left lower leg: No edema.   Lymphadenopathy:      Cervical: No cervical adenopathy.   Skin:     Capillary Refill: Capillary refill takes less than 2 seconds.      Coloration: Skin is not jaundiced.      Findings: No rash.   Neurological:      General: No focal deficit present.      Mental Status: She is alert and oriented to person, place, and time. Mental status is at baseline.      Gait: Gait is intact.   Psychiatric:         Mood and Affect: Mood normal.         Behavior: Behavior is cooperative.         Labs Reviewed   CBC WITH AUTO DIFFERENTIAL - Abnormal       Result Value    WBC 3.8 (*)     nRBC 0.0      RBC 2.01 (*)     Hemoglobin 6.8 (*)     Hematocrit 21.2 (*)      (*)     MCH 33.8      MCHC 32.1      RDW 22.6 (*)     Platelets 239      Neutrophils % 65.4      Immature Granulocytes %, Automated 0.5      Lymphocytes % 21.6      Monocytes % 10.1      Eosinophils % 1.6      Basophils % 0.8      Neutrophils Absolute 2.45      Immature Granulocytes Absolute, Automated 0.02      Lymphocytes Absolute 0.81      Monocytes Absolute 0.38      Eosinophils Absolute 0.06      Basophils Absolute 0.03     BASIC METABOLIC PANEL - Abnormal    Glucose 195 (*)     Sodium 138      Potassium 3.7      Chloride 98      Bicarbonate 30      Anion Gap 14      Urea Nitrogen 24 (*)     Creatinine 2.37 (*)     eGFR 20 (*)     Calcium 9.3     TYPE AND SCREEN    ABO TYPE A      Rh TYPE POS      ANTIBODY SCREEN NEG     PREPARE RBC    PRODUCT CODE K2091Q35      Unit Number [id number]      Unit ABO A      Unit RH NEG      XM INTEP COMP      Dispense Status TR      Blood Expiration Date 1/2/2025 11:59:00 PM EST      PRODUCT  BLOOD TYPE 0600      UNIT VOLUME 350     MORPHOLOGY    RBC Morphology See Below      Hypochromia Mild      Target Cells Few      Ovalocytes Few         No orders to display            ED Course & MDM   Diagnoses as of 12/23/24 1934   Anemia, unspecified type           Medical Decision Making  Patient was seen and evaluated by Dr. Benson. Saline lock was established with labs drawn and results as above. Noted to be severely anemic with a H & H of 6.8/21.2. Noted to have a low WBC count of 3.8 with normal platelets. Kidney function is at baseline. Electrolytes were unremarkable. She was typed and crossmatched for a unit os PRBC's. She had no issues throughout the transfusion and remained hemodynamically stable. Follow up with their doctor in 3 days. Return if worse in any way. Discharged in stable condition with computer instructions.    Diagnostic Impression:     1. Acute on chronic anemia requiring blood transfusion    2. Critical care time 35 minutes             Your medication list        ASK your doctor about these medications        Instructions Last Dose Given Next Dose Due   allopurinol 100 mg tablet  Commonly known as: Zyloprim           amLODIPine 10 mg tablet  Commonly known as: Norvasc           atorvastatin 10 mg tablet  Commonly known as: Lipitor      Take 1 tablet (10 mg) by mouth once daily.       carvedilol 25 mg tablet  Commonly known as: Coreg      Take 1 tablet (25 mg) by mouth 2 times a day.       cholecalciferol 50 MCG (2000 UT) tablet  Commonly known as: Vitamin D-3           cyanocobalamin 1,000 mcg tablet  Commonly known as: Vitamin B-12           losartan 50 mg tablet  Commonly known as: Cozaar      Take 1 tablet (50 mg) by mouth once daily.       pioglitazone 15 mg tablet  Commonly known as: Actos      Take 1 tablet (15 mg) by mouth once daily.       pregabalin 100 mg capsule  Commonly known as: Lyrica      Take 1 capsule (100 mg) by mouth 2 times a day.       sevelamer carbonate 800 mg  tablet  Commonly known as: Renvela                      Procedure  Critical Care    Performed by: TIKA De Leon  Authorized by: Sariah Benson MD    Critical care provider statement:     Critical care time (minutes):  35    Critical care time was exclusive of:  Separately billable procedures and treating other patients    Critical care was necessary to treat or prevent imminent or life-threatening deterioration of the following conditions: anemia requiring blood transfusion.    Critical care was time spent personally by me on the following activities:  Blood draw for specimens, development of treatment plan with patient or surrogate, evaluation of patient's response to treatment, examination of patient, ordering and review of radiographic studies, ordering and review of laboratory studies, ordering and performing treatments and interventions, re-evaluation of patient's condition and review of old charts       TIKA De Leon  12/23/24 1935

## 2024-12-24 ENCOUNTER — APPOINTMENT (OUTPATIENT)
Dept: INFUSION THERAPY | Facility: HOSPITAL | Age: 79
End: 2024-12-24
Payer: MEDICARE

## 2024-12-26 ENCOUNTER — HOSPITAL ENCOUNTER (OUTPATIENT)
Dept: RADIOLOGY | Facility: CLINIC | Age: 79
Discharge: HOME | End: 2024-12-26
Payer: MEDICARE

## 2024-12-26 VITALS — WEIGHT: 145 LBS | BODY MASS INDEX: 27.38 KG/M2 | HEIGHT: 61 IN

## 2024-12-26 DIAGNOSIS — Z12.31 ENCOUNTER FOR SCREENING MAMMOGRAM FOR BREAST CANCER: ICD-10-CM

## 2024-12-26 PROCEDURE — 77063 BREAST TOMOSYNTHESIS BI: CPT | Performed by: RADIOLOGY

## 2024-12-26 PROCEDURE — 77067 SCR MAMMO BI INCL CAD: CPT

## 2024-12-26 PROCEDURE — 77067 SCR MAMMO BI INCL CAD: CPT | Performed by: RADIOLOGY

## 2024-12-30 ENCOUNTER — APPOINTMENT (OUTPATIENT)
Dept: INFUSION THERAPY | Facility: HOSPITAL | Age: 79
End: 2024-12-30
Payer: MEDICARE

## 2025-01-04 DIAGNOSIS — M79.18 MYOFASCIAL PAIN SYNDROME: ICD-10-CM

## 2025-01-04 DIAGNOSIS — G57.93 NEUROPATHIC PAIN OF BOTH FEET: ICD-10-CM

## 2025-01-06 ENCOUNTER — TELEPHONE (OUTPATIENT)
Dept: PRIMARY CARE | Facility: CLINIC | Age: 80
End: 2025-01-06
Payer: MEDICARE

## 2025-01-06 RX ORDER — FLUTICASONE PROPIONATE 50 MCG
1 SPRAY, SUSPENSION (ML) NASAL DAILY
COMMUNITY

## 2025-01-06 RX ORDER — PREGABALIN 100 MG/1
100 CAPSULE ORAL 2 TIMES DAILY
Qty: 60 CAPSULE | Refills: 0 | Status: SHIPPED | OUTPATIENT
Start: 2025-01-17

## 2025-01-06 NOTE — TELEPHONE ENCOUNTER
Patient called in stating that she has no medication to take for her diabetes. Patient states that she wants to be on Januvia because that's what's worked for years. Please advise.

## 2025-01-07 ENCOUNTER — APPOINTMENT (OUTPATIENT)
Dept: INFUSION THERAPY | Facility: HOSPITAL | Age: 80
End: 2025-01-07
Payer: MEDICARE

## 2025-01-07 DIAGNOSIS — E11.9 TYPE 2 DIABETES MELLITUS WITHOUT COMPLICATION, WITHOUT LONG-TERM CURRENT USE OF INSULIN (MULTI): ICD-10-CM

## 2025-01-07 RX ORDER — PIOGLITAZONEHYDROCHLORIDE 15 MG/1
15 TABLET ORAL DAILY
Qty: 30 TABLET | Refills: 1 | Status: SHIPPED | OUTPATIENT
Start: 2025-01-07

## 2025-01-07 NOTE — TELEPHONE ENCOUNTER
Patient called and notified. However, it seems that the Rx for the actos is . Pended, she will need enough to last until her 25 appt.

## 2025-01-07 NOTE — TELEPHONE ENCOUNTER
LOV: 10/28/24  NOV: 25  No Show: , 24, 24, 24, 24, 24    RX is . Patient will need enough to last until 2/4/15 appt.

## 2025-01-09 ENCOUNTER — LAB (OUTPATIENT)
Dept: LAB | Facility: HOSPITAL | Age: 80
End: 2025-01-09
Payer: MEDICARE

## 2025-01-09 ENCOUNTER — PATIENT OUTREACH (OUTPATIENT)
Dept: PRIMARY CARE | Facility: CLINIC | Age: 80
End: 2025-01-09
Payer: MEDICARE

## 2025-01-09 DIAGNOSIS — D50.9 IRON DEFICIENCY ANEMIA, UNSPECIFIED IRON DEFICIENCY ANEMIA TYPE: ICD-10-CM

## 2025-01-09 DIAGNOSIS — D64.9 ANEMIA, UNSPECIFIED TYPE: ICD-10-CM

## 2025-01-09 LAB
ABO GROUP (TYPE) IN BLOOD: NORMAL
ANTIBODY SCREEN: NORMAL
ERYTHROCYTE [DISTWIDTH] IN BLOOD BY AUTOMATED COUNT: 23.9 % (ref 11.5–14.5)
HCT VFR BLD AUTO: 23 % (ref 36–46)
HGB BLD-MCNC: 7.1 G/DL (ref 12–16)
MCH RBC QN AUTO: 33.8 PG (ref 26–34)
MCHC RBC AUTO-ENTMCNC: 30.9 G/DL (ref 32–36)
MCV RBC AUTO: 110 FL (ref 80–100)
PLATELET # BLD AUTO: 230 X10*3/UL (ref 150–450)
RBC # BLD AUTO: 2.1 X10*6/UL (ref 4–5.2)
RH FACTOR (ANTIGEN D): NORMAL
WBC # BLD AUTO: 4.2 X10*3/UL (ref 4.4–11.3)

## 2025-01-09 PROCEDURE — 86901 BLOOD TYPING SEROLOGIC RH(D): CPT

## 2025-01-09 PROCEDURE — 36415 COLL VENOUS BLD VENIPUNCTURE: CPT

## 2025-01-09 PROCEDURE — 85027 COMPLETE CBC AUTOMATED: CPT

## 2025-01-13 ENCOUNTER — DOCUMENTATION (OUTPATIENT)
Dept: HEMATOLOGY/ONCOLOGY | Facility: HOSPITAL | Age: 80
End: 2025-01-13
Payer: MEDICARE

## 2025-01-13 ENCOUNTER — LAB (OUTPATIENT)
Dept: LAB | Facility: HOSPITAL | Age: 80
End: 2025-01-13
Payer: MEDICARE

## 2025-01-13 DIAGNOSIS — Z99.2 ANEMIA DUE TO CHRONIC KIDNEY DISEASE, ON CHRONIC DIALYSIS (MULTI): Primary | Chronic | ICD-10-CM

## 2025-01-13 DIAGNOSIS — D46.9 MYELODYSPLASTIC SYNDROME (MULTI): ICD-10-CM

## 2025-01-13 DIAGNOSIS — D64.9 ANEMIA, UNSPECIFIED TYPE: ICD-10-CM

## 2025-01-13 DIAGNOSIS — D50.9 IRON DEFICIENCY ANEMIA, UNSPECIFIED IRON DEFICIENCY ANEMIA TYPE: ICD-10-CM

## 2025-01-13 DIAGNOSIS — N18.6 ANEMIA DUE TO CHRONIC KIDNEY DISEASE, ON CHRONIC DIALYSIS (MULTI): Primary | Chronic | ICD-10-CM

## 2025-01-13 DIAGNOSIS — D63.1 ANEMIA DUE TO CHRONIC KIDNEY DISEASE, ON CHRONIC DIALYSIS (MULTI): Primary | Chronic | ICD-10-CM

## 2025-01-13 LAB
ABO GROUP (TYPE) IN BLOOD: NORMAL
ANTIBODY SCREEN: NORMAL
BASOPHILS # BLD AUTO: 0.03 X10*3/UL (ref 0–0.1)
BASOPHILS NFR BLD AUTO: 1 %
DACRYOCYTES BLD QL SMEAR: NORMAL
EOSINOPHIL # BLD AUTO: 0.22 X10*3/UL (ref 0–0.4)
EOSINOPHIL NFR BLD AUTO: 7.1 %
ERYTHROCYTE [DISTWIDTH] IN BLOOD BY AUTOMATED COUNT: 23.7 % (ref 11.5–14.5)
HCT VFR BLD AUTO: 20.3 % (ref 36–46)
HGB BLD-MCNC: 6.4 G/DL (ref 12–16)
HYPOCHROMIA BLD QL SMEAR: NORMAL
IMM GRANULOCYTES # BLD AUTO: 0.01 X10*3/UL (ref 0–0.5)
IMM GRANULOCYTES NFR BLD AUTO: 0.3 % (ref 0–0.9)
LYMPHOCYTES # BLD AUTO: 0.93 X10*3/UL (ref 0.8–3)
LYMPHOCYTES NFR BLD AUTO: 29.8 %
MCH RBC QN AUTO: 34.6 PG (ref 26–34)
MCHC RBC AUTO-ENTMCNC: 31.5 G/DL (ref 32–36)
MCV RBC AUTO: 110 FL (ref 80–100)
MONOCYTES # BLD AUTO: 0.27 X10*3/UL (ref 0.05–0.8)
MONOCYTES NFR BLD AUTO: 8.7 %
NEUTROPHILS # BLD AUTO: 1.66 X10*3/UL (ref 1.6–5.5)
NEUTROPHILS NFR BLD AUTO: 53.1 %
NRBC BLD-RTO: ABNORMAL /100{WBCS}
OVALOCYTES BLD QL SMEAR: NORMAL
PLATELET # BLD AUTO: 226 X10*3/UL (ref 150–450)
POLYCHROMASIA BLD QL SMEAR: NORMAL
RBC # BLD AUTO: 1.85 X10*6/UL (ref 4–5.2)
RBC MORPH BLD: NORMAL
RH FACTOR (ANTIGEN D): NORMAL
WBC # BLD AUTO: 3.1 X10*3/UL (ref 4.4–11.3)

## 2025-01-13 PROCEDURE — 85025 COMPLETE CBC W/AUTO DIFF WBC: CPT

## 2025-01-13 PROCEDURE — 36415 COLL VENOUS BLD VENIPUNCTURE: CPT

## 2025-01-13 PROCEDURE — 86901 BLOOD TYPING SEROLOGIC RH(D): CPT

## 2025-01-13 PROCEDURE — 86922 COMPATIBILITY TEST ANTIGLOB: CPT

## 2025-01-13 RX ORDER — FAMOTIDINE 10 MG/ML
20 INJECTION INTRAVENOUS ONCE AS NEEDED
Status: CANCELLED | OUTPATIENT
Start: 2025-01-13

## 2025-01-13 RX ORDER — FAMOTIDINE 10 MG/ML
20 INJECTION INTRAVENOUS ONCE AS NEEDED
Status: CANCELLED | OUTPATIENT
Start: 2025-01-14

## 2025-01-13 RX ORDER — EPINEPHRINE 0.3 MG/.3ML
0.3 INJECTION SUBCUTANEOUS EVERY 5 MIN PRN
Status: CANCELLED | OUTPATIENT
Start: 2025-01-14

## 2025-01-13 RX ORDER — DIPHENHYDRAMINE HYDROCHLORIDE 50 MG/ML
50 INJECTION INTRAMUSCULAR; INTRAVENOUS AS NEEDED
Status: CANCELLED | OUTPATIENT
Start: 2025-01-14

## 2025-01-13 RX ORDER — ALBUTEROL SULFATE 0.83 MG/ML
3 SOLUTION RESPIRATORY (INHALATION) AS NEEDED
Status: CANCELLED | OUTPATIENT
Start: 2025-01-13

## 2025-01-13 RX ORDER — EPINEPHRINE 0.3 MG/.3ML
0.3 INJECTION SUBCUTANEOUS EVERY 5 MIN PRN
Status: CANCELLED | OUTPATIENT
Start: 2025-01-13

## 2025-01-13 RX ORDER — DIPHENHYDRAMINE HYDROCHLORIDE 50 MG/ML
50 INJECTION INTRAMUSCULAR; INTRAVENOUS AS NEEDED
Status: CANCELLED | OUTPATIENT
Start: 2025-01-13

## 2025-01-13 RX ORDER — ALBUTEROL SULFATE 0.83 MG/ML
3 SOLUTION RESPIRATORY (INHALATION) AS NEEDED
Status: CANCELLED | OUTPATIENT
Start: 2025-01-14

## 2025-01-13 NOTE — PROGRESS NOTES
Notified by Jermaine Barboza in Lab Services that HGB 6.4. Secure Chat sent to Rose Casal NP who has ordered PRBCs x2 for tomorrow.    1/13, hgb-6.3 today  2units ordered to be given on 1/14 starting at 0800. Prepare orders released for 2 units  Tana was notified of date and time

## 2025-01-14 ENCOUNTER — INFUSION (OUTPATIENT)
Dept: INFUSION THERAPY | Facility: HOSPITAL | Age: 80
End: 2025-01-14
Payer: MEDICARE

## 2025-01-14 VITALS
OXYGEN SATURATION: 98 % | SYSTOLIC BLOOD PRESSURE: 190 MMHG | TEMPERATURE: 98.6 F | DIASTOLIC BLOOD PRESSURE: 70 MMHG | RESPIRATION RATE: 20 BRPM | HEART RATE: 70 BPM

## 2025-01-14 DIAGNOSIS — Z99.2 ANEMIA DUE TO CHRONIC KIDNEY DISEASE, ON CHRONIC DIALYSIS (MULTI): Chronic | ICD-10-CM

## 2025-01-14 DIAGNOSIS — D46.9 MYELODYSPLASTIC SYNDROME (MULTI): ICD-10-CM

## 2025-01-14 DIAGNOSIS — D64.9 ANEMIA, UNSPECIFIED TYPE: ICD-10-CM

## 2025-01-14 DIAGNOSIS — D63.1 ANEMIA DUE TO CHRONIC KIDNEY DISEASE, ON CHRONIC DIALYSIS (MULTI): Chronic | ICD-10-CM

## 2025-01-14 DIAGNOSIS — D64.9 ANEMIA, UNSPECIFIED TYPE: Primary | ICD-10-CM

## 2025-01-14 DIAGNOSIS — N18.6 ANEMIA DUE TO CHRONIC KIDNEY DISEASE, ON CHRONIC DIALYSIS (MULTI): Chronic | ICD-10-CM

## 2025-01-14 LAB
COMPONENT CODE: NORMAL
UNIT NUMBER: NORMAL

## 2025-01-14 PROCEDURE — 36430 TRANSFUSION BLD/BLD COMPNT: CPT

## 2025-01-14 PROCEDURE — 86901 BLOOD TYPING SEROLOGIC RH(D): CPT

## 2025-01-14 PROCEDURE — 86880 COOMBS TEST DIRECT: CPT

## 2025-01-14 PROCEDURE — 36415 COLL VENOUS BLD VENIPUNCTURE: CPT

## 2025-01-14 PROCEDURE — P9040 RBC LEUKOREDUCED IRRADIATED: HCPCS

## 2025-01-14 PROCEDURE — 87070 CULTURE OTHR SPECIMN AEROBIC: CPT

## 2025-01-14 PROCEDURE — 2500000001 HC RX 250 WO HCPCS SELF ADMINISTERED DRUGS (ALT 637 FOR MEDICARE OP): Performed by: NURSE PRACTITIONER

## 2025-01-14 RX ORDER — HEPARIN SODIUM,PORCINE/PF 10 UNIT/ML
50 SYRINGE (ML) INTRAVENOUS AS NEEDED
OUTPATIENT
Start: 2025-01-14

## 2025-01-14 RX ORDER — ACETAMINOPHEN 325 MG/1
650 TABLET ORAL ONCE
Status: CANCELLED | OUTPATIENT
Start: 2025-01-14

## 2025-01-14 RX ORDER — HEPARIN 100 UNIT/ML
500 SYRINGE INTRAVENOUS AS NEEDED
OUTPATIENT
Start: 2025-01-14

## 2025-01-14 RX ORDER — DIPHENHYDRAMINE HYDROCHLORIDE 50 MG/ML
50 INJECTION INTRAMUSCULAR; INTRAVENOUS AS NEEDED
OUTPATIENT
Start: 2025-01-14

## 2025-01-14 RX ORDER — EPINEPHRINE 0.3 MG/.3ML
0.3 INJECTION SUBCUTANEOUS EVERY 5 MIN PRN
OUTPATIENT
Start: 2025-01-14

## 2025-01-14 RX ORDER — ALBUTEROL SULFATE 0.83 MG/ML
3 SOLUTION RESPIRATORY (INHALATION) AS NEEDED
OUTPATIENT
Start: 2025-01-14

## 2025-01-14 RX ORDER — ACETAMINOPHEN 325 MG/1
650 TABLET ORAL ONCE
Status: COMPLETED | OUTPATIENT
Start: 2025-01-14 | End: 2025-01-14

## 2025-01-14 RX ORDER — FAMOTIDINE 10 MG/ML
20 INJECTION INTRAVENOUS ONCE AS NEEDED
OUTPATIENT
Start: 2025-01-14

## 2025-01-14 RX ADMIN — ACETAMINOPHEN 650 MG: 325 TABLET ORAL at 12:52

## 2025-01-14 ASSESSMENT — PAIN SCALES - GENERAL: PAINLEVEL_OUTOF10: 0-NO PAIN

## 2025-01-14 ASSESSMENT — ENCOUNTER SYMPTOMS
OCCASIONAL FEELINGS OF UNSTEADINESS: 1
DEPRESSION: 0
LOSS OF SENSATION IN FEET: 1

## 2025-01-15 LAB
BLOOD EXPIRATION DATE: NORMAL
BLOOD EXPIRATION DATE: NORMAL
DIAGNOSIS-BB-PR33: NORMAL
DISPENSE STATUS: NORMAL
DISPENSE STATUS: NORMAL
PATH REVIEW-TRANSFUSION REACTION: NORMAL
PRODUCT BLOOD TYPE: 6200
PRODUCT BLOOD TYPE: 6200
PRODUCT CODE: NORMAL
PRODUCT CODE: NORMAL
TYPE OF REACTION: NORMAL
UNIT ABO: NORMAL
UNIT ABO: NORMAL
UNIT NUMBER: NORMAL
UNIT NUMBER: NORMAL
UNIT RH: NORMAL
UNIT RH: NORMAL
UNIT VOLUME: 350
UNIT VOLUME: 350
XM INTEP: NORMAL
XM INTEP: NORMAL

## 2025-01-16 ENCOUNTER — TELEPHONE (OUTPATIENT)
Dept: HEMATOLOGY/ONCOLOGY | Facility: CLINIC | Age: 80
End: 2025-01-16
Payer: MEDICARE

## 2025-01-16 DIAGNOSIS — D50.9 IRON DEFICIENCY ANEMIA, UNSPECIFIED IRON DEFICIENCY ANEMIA TYPE: ICD-10-CM

## 2025-01-16 DIAGNOSIS — D64.9 ANEMIA, UNSPECIFIED TYPE: ICD-10-CM

## 2025-01-16 NOTE — TELEPHONE ENCOUNTER
Spoke with Tana who said she was feeling week and believed she needed to have her hgb checked. Explained that the infusion center could not transfuse blood until 1/23 at 1030. Explained that she would need to go to ER if she continued to feel poorly. Instructed her to come to the [facility] office on tues 1/21 for blood work, if her hgb was <7.0 she would be scheduled for a transfusion at the [facility] building  Reminded Tana of her appt with dr. Medina on Thurs 1/23 at 0845  Tana verbalized understanding with teach back

## 2025-01-17 LAB
ABO GROUP (TYPE) IN BLOOD: NORMAL
BACTERIA BPU CULT: NO GROWTH
DAT-POLYSPECIFIC: NORMAL
GRAM STN SPEC: NORMAL
RH FACTOR (ANTIGEN D): NORMAL

## 2025-01-21 ENCOUNTER — LAB (OUTPATIENT)
Dept: LAB | Facility: HOSPITAL | Age: 80
End: 2025-01-21
Payer: MEDICARE

## 2025-01-21 DIAGNOSIS — D46.9 MYELODYSPLASTIC SYNDROME (MULTI): ICD-10-CM

## 2025-01-21 LAB
ABO GROUP (TYPE) IN BLOOD: NORMAL
ANTIBODY SCREEN: NORMAL
BASOPHILS # BLD AUTO: 0.03 X10*3/UL (ref 0–0.1)
BASOPHILS NFR BLD AUTO: 0.9 %
DACRYOCYTES BLD QL SMEAR: NORMAL
EOSINOPHIL # BLD AUTO: 0.17 X10*3/UL (ref 0–0.4)
EOSINOPHIL NFR BLD AUTO: 5.2 %
ERYTHROCYTE [DISTWIDTH] IN BLOOD BY AUTOMATED COUNT: 23.4 % (ref 11.5–14.5)
HCT VFR BLD AUTO: 25.7 % (ref 36–46)
HGB BLD-MCNC: 8.1 G/DL (ref 12–16)
HYPOCHROMIA BLD QL SMEAR: NORMAL
IMM GRANULOCYTES # BLD AUTO: 0.01 X10*3/UL (ref 0–0.5)
IMM GRANULOCYTES NFR BLD AUTO: 0.3 % (ref 0–0.9)
LYMPHOCYTES # BLD AUTO: 1.08 X10*3/UL (ref 0.8–3)
LYMPHOCYTES NFR BLD AUTO: 33 %
MCH RBC QN AUTO: 33.6 PG (ref 26–34)
MCHC RBC AUTO-ENTMCNC: 31.5 G/DL (ref 32–36)
MCV RBC AUTO: 107 FL (ref 80–100)
MONOCYTES # BLD AUTO: 0.36 X10*3/UL (ref 0.05–0.8)
MONOCYTES NFR BLD AUTO: 11 %
NEUTROPHILS # BLD AUTO: 1.62 X10*3/UL (ref 1.6–5.5)
NEUTROPHILS NFR BLD AUTO: 49.6 %
NRBC BLD-RTO: ABNORMAL /100{WBCS}
OVALOCYTES BLD QL SMEAR: NORMAL
PLATELET # BLD AUTO: 206 X10*3/UL (ref 150–450)
POLYCHROMASIA BLD QL SMEAR: NORMAL
RBC # BLD AUTO: 2.41 X10*6/UL (ref 4–5.2)
RBC MORPH BLD: NORMAL
RH FACTOR (ANTIGEN D): NORMAL
WBC # BLD AUTO: 3.3 X10*3/UL (ref 4.4–11.3)

## 2025-01-21 PROCEDURE — 85025 COMPLETE CBC W/AUTO DIFF WBC: CPT

## 2025-01-21 PROCEDURE — 86901 BLOOD TYPING SEROLOGIC RH(D): CPT

## 2025-01-21 PROCEDURE — 36415 COLL VENOUS BLD VENIPUNCTURE: CPT

## 2025-01-23 ENCOUNTER — TELEPHONE (OUTPATIENT)
Dept: HEMATOLOGY/ONCOLOGY | Facility: CLINIC | Age: 80
End: 2025-01-23
Payer: MEDICARE

## 2025-01-23 ENCOUNTER — APPOINTMENT (OUTPATIENT)
Dept: HEMATOLOGY/ONCOLOGY | Facility: CLINIC | Age: 80
End: 2025-01-23
Payer: MEDICARE

## 2025-01-23 ENCOUNTER — APPOINTMENT (OUTPATIENT)
Dept: INFUSION THERAPY | Facility: HOSPITAL | Age: 80
End: 2025-01-23
Payer: MEDICARE

## 2025-01-23 NOTE — TELEPHONE ENCOUNTER
Spoke with Tana about the missed appt with dr. Medina today. She stated she completely forgot about the appt. Rescheduled her to 2/4 at 0930 with  dr. Medina.  Tana will have lab work completed on 1/27 and if needed she will have a blood transfusion at 0800 on 1/28  Will check lab work on 2/4 and schedule for bld if needed.

## 2025-01-27 ENCOUNTER — LAB (OUTPATIENT)
Dept: LAB | Facility: HOSPITAL | Age: 80
End: 2025-01-27
Payer: MEDICARE

## 2025-01-27 DIAGNOSIS — D50.9 IRON DEFICIENCY ANEMIA, UNSPECIFIED IRON DEFICIENCY ANEMIA TYPE: ICD-10-CM

## 2025-01-27 DIAGNOSIS — D64.9 ANEMIA, UNSPECIFIED TYPE: ICD-10-CM

## 2025-01-27 LAB
BASOPHILS # BLD AUTO: 0.03 X10*3/UL (ref 0–0.1)
BASOPHILS NFR BLD AUTO: 0.8 %
EOSINOPHIL # BLD AUTO: 0.18 X10*3/UL (ref 0–0.4)
EOSINOPHIL NFR BLD AUTO: 4.5 %
ERYTHROCYTE [DISTWIDTH] IN BLOOD BY AUTOMATED COUNT: 23.6 % (ref 11.5–14.5)
HCT VFR BLD AUTO: 24.2 % (ref 36–46)
HGB BLD-MCNC: 7.6 G/DL (ref 12–16)
HYPOCHROMIA BLD QL SMEAR: NORMAL
IMM GRANULOCYTES # BLD AUTO: 0.01 X10*3/UL (ref 0–0.5)
IMM GRANULOCYTES NFR BLD AUTO: 0.3 % (ref 0–0.9)
LYMPHOCYTES # BLD AUTO: 1.4 X10*3/UL (ref 0.8–3)
LYMPHOCYTES NFR BLD AUTO: 35.3 %
MCH RBC QN AUTO: 33.8 PG (ref 26–34)
MCHC RBC AUTO-ENTMCNC: 31.4 G/DL (ref 32–36)
MCV RBC AUTO: 108 FL (ref 80–100)
MONOCYTES # BLD AUTO: 0.33 X10*3/UL (ref 0.05–0.8)
MONOCYTES NFR BLD AUTO: 8.3 %
NEUTROPHILS # BLD AUTO: 2.02 X10*3/UL (ref 1.6–5.5)
NEUTROPHILS NFR BLD AUTO: 50.8 %
NRBC BLD-RTO: ABNORMAL /100{WBCS}
OVALOCYTES BLD QL SMEAR: NORMAL
PLATELET # BLD AUTO: 232 X10*3/UL (ref 150–450)
RBC # BLD AUTO: 2.25 X10*6/UL (ref 4–5.2)
RBC MORPH BLD: NORMAL
WBC # BLD AUTO: 4 X10*3/UL (ref 4.4–11.3)

## 2025-01-27 PROCEDURE — 36415 COLL VENOUS BLD VENIPUNCTURE: CPT

## 2025-01-27 PROCEDURE — 85025 COMPLETE CBC W/AUTO DIFF WBC: CPT

## 2025-01-28 ENCOUNTER — APPOINTMENT (OUTPATIENT)
Dept: INFUSION THERAPY | Facility: HOSPITAL | Age: 80
End: 2025-01-28
Payer: MEDICARE

## 2025-02-03 ENCOUNTER — APPOINTMENT (OUTPATIENT)
Dept: HEMATOLOGY/ONCOLOGY | Facility: CLINIC | Age: 80
End: 2025-02-03
Payer: MEDICARE

## 2025-02-04 ENCOUNTER — SOCIAL WORK (OUTPATIENT)
Dept: HEMATOLOGY/ONCOLOGY | Facility: CLINIC | Age: 80
End: 2025-02-04

## 2025-02-04 ENCOUNTER — APPOINTMENT (OUTPATIENT)
Dept: PRIMARY CARE | Facility: CLINIC | Age: 80
End: 2025-02-04
Payer: MEDICARE

## 2025-02-04 ENCOUNTER — OFFICE VISIT (OUTPATIENT)
Dept: HEMATOLOGY/ONCOLOGY | Facility: CLINIC | Age: 80
End: 2025-02-04
Payer: MEDICARE

## 2025-02-04 VITALS
DIASTOLIC BLOOD PRESSURE: 61 MMHG | BODY MASS INDEX: 30.03 KG/M2 | SYSTOLIC BLOOD PRESSURE: 162 MMHG | TEMPERATURE: 97.7 F | OXYGEN SATURATION: 93 % | WEIGHT: 159.06 LBS | HEIGHT: 61 IN | RESPIRATION RATE: 16 BRPM | HEART RATE: 70 BPM

## 2025-02-04 DIAGNOSIS — D64.9 ANEMIA, UNSPECIFIED TYPE: ICD-10-CM

## 2025-02-04 DIAGNOSIS — D50.9 IRON DEFICIENCY ANEMIA, UNSPECIFIED IRON DEFICIENCY ANEMIA TYPE: ICD-10-CM

## 2025-02-04 DIAGNOSIS — D46.9 MYELODYSPLASTIC SYNDROME (MULTI): ICD-10-CM

## 2025-02-04 LAB
ABO GROUP (TYPE) IN BLOOD: NORMAL
ANTIBODY SCREEN: NORMAL
BASOPHILS # BLD AUTO: 0.02 X10*3/UL (ref 0–0.1)
BASOPHILS NFR BLD AUTO: 0.5 %
CRP SERPL-MCNC: 1.18 MG/DL
DACRYOCYTES BLD QL SMEAR: NORMAL
EOSINOPHIL # BLD AUTO: 0.2 X10*3/UL (ref 0–0.4)
EOSINOPHIL NFR BLD AUTO: 5 %
ERYTHROCYTE [DISTWIDTH] IN BLOOD BY AUTOMATED COUNT: 25.1 % (ref 11.5–14.5)
FERRITIN SERPL-MCNC: 3552 NG/ML (ref 8–150)
HCT VFR BLD AUTO: 22.4 % (ref 36–46)
HGB BLD-MCNC: 7.4 G/DL (ref 12–16)
HGB RETIC QN: 32 PG (ref 28–38)
HYPOCHROMIA BLD QL SMEAR: NORMAL
IMM GRANULOCYTES # BLD AUTO: 0.01 X10*3/UL (ref 0–0.5)
IMM GRANULOCYTES NFR BLD AUTO: 0.2 % (ref 0–0.9)
IMMATURE RETIC FRACTION: 30.7 %
LDH SERPL L TO P-CCNC: 232 U/L (ref 84–246)
LYMPHOCYTES # BLD AUTO: 1.22 X10*3/UL (ref 0.8–3)
LYMPHOCYTES NFR BLD AUTO: 30.2 %
MCH RBC QN AUTO: 35.1 PG (ref 26–34)
MCHC RBC AUTO-ENTMCNC: 33 G/DL (ref 32–36)
MCV RBC AUTO: 106 FL (ref 80–100)
MONOCYTES # BLD AUTO: 0.4 X10*3/UL (ref 0.05–0.8)
MONOCYTES NFR BLD AUTO: 9.9 %
NEUTROPHILS # BLD AUTO: 2.19 X10*3/UL (ref 1.6–5.5)
NEUTROPHILS NFR BLD AUTO: 54.2 %
NRBC BLD-RTO: ABNORMAL /100{WBCS}
OVALOCYTES BLD QL SMEAR: NORMAL
PLATELET # BLD AUTO: 211 X10*3/UL (ref 150–450)
POLYCHROMASIA BLD QL SMEAR: NORMAL
RBC # BLD AUTO: 2.11 X10*6/UL (ref 4–5.2)
RBC MORPH BLD: NORMAL
RETICS #: 0.07 X10*6/UL (ref 0.02–0.11)
RETICS/RBC NFR AUTO: 3.2 % (ref 0.5–2)
RH FACTOR (ANTIGEN D): NORMAL
VIT B12 SERPL-MCNC: 940 PG/ML (ref 211–911)
WBC # BLD AUTO: 4 X10*3/UL (ref 4.4–11.3)

## 2025-02-04 PROCEDURE — 83615 LACTATE (LD) (LDH) ENZYME: CPT | Performed by: INTERNAL MEDICINE

## 2025-02-04 PROCEDURE — 82607 VITAMIN B-12: CPT | Mod: PORLAB | Performed by: INTERNAL MEDICINE

## 2025-02-04 PROCEDURE — 1160F RVW MEDS BY RX/DR IN RCRD: CPT | Performed by: INTERNAL MEDICINE

## 2025-02-04 PROCEDURE — 99215 OFFICE O/P EST HI 40 MIN: CPT | Performed by: INTERNAL MEDICINE

## 2025-02-04 PROCEDURE — 85045 AUTOMATED RETICULOCYTE COUNT: CPT | Performed by: INTERNAL MEDICINE

## 2025-02-04 PROCEDURE — 36415 COLL VENOUS BLD VENIPUNCTURE: CPT | Performed by: INTERNAL MEDICINE

## 2025-02-04 PROCEDURE — 85025 COMPLETE CBC W/AUTO DIFF WBC: CPT | Performed by: INTERNAL MEDICINE

## 2025-02-04 PROCEDURE — 3077F SYST BP >= 140 MM HG: CPT | Performed by: INTERNAL MEDICINE

## 2025-02-04 PROCEDURE — 82525 ASSAY OF COPPER: CPT | Performed by: INTERNAL MEDICINE

## 2025-02-04 PROCEDURE — 1125F AMNT PAIN NOTED PAIN PRSNT: CPT | Performed by: INTERNAL MEDICINE

## 2025-02-04 PROCEDURE — 82728 ASSAY OF FERRITIN: CPT | Performed by: INTERNAL MEDICINE

## 2025-02-04 PROCEDURE — 86140 C-REACTIVE PROTEIN: CPT | Performed by: INTERNAL MEDICINE

## 2025-02-04 PROCEDURE — 86900 BLOOD TYPING SEROLOGIC ABO: CPT | Performed by: INTERNAL MEDICINE

## 2025-02-04 PROCEDURE — 86922 COMPATIBILITY TEST ANTIGLOB: CPT

## 2025-02-04 PROCEDURE — 1157F ADVNC CARE PLAN IN RCRD: CPT | Performed by: INTERNAL MEDICINE

## 2025-02-04 PROCEDURE — 3078F DIAST BP <80 MM HG: CPT | Performed by: INTERNAL MEDICINE

## 2025-02-04 PROCEDURE — 1159F MED LIST DOCD IN RCRD: CPT | Performed by: INTERNAL MEDICINE

## 2025-02-04 RX ORDER — FAMOTIDINE 10 MG/ML
20 INJECTION INTRAVENOUS ONCE AS NEEDED
Status: CANCELLED | OUTPATIENT
Start: 2025-02-04

## 2025-02-04 RX ORDER — ALBUTEROL SULFATE 0.83 MG/ML
3 SOLUTION RESPIRATORY (INHALATION) AS NEEDED
Status: CANCELLED | OUTPATIENT
Start: 2025-02-04

## 2025-02-04 RX ORDER — DIPHENHYDRAMINE HYDROCHLORIDE 50 MG/ML
50 INJECTION INTRAMUSCULAR; INTRAVENOUS AS NEEDED
Status: CANCELLED | OUTPATIENT
Start: 2025-02-04

## 2025-02-04 RX ORDER — EPINEPHRINE 0.3 MG/.3ML
0.3 INJECTION SUBCUTANEOUS EVERY 5 MIN PRN
Status: CANCELLED | OUTPATIENT
Start: 2025-02-04

## 2025-02-04 ASSESSMENT — PAIN SCALES - GENERAL: PAINLEVEL_OUTOF10: 9

## 2025-02-04 NOTE — PROGRESS NOTES
Patient missed her appointment earlier in the morning due to transportation.  Patient just arrived.   (SW) met with patient and friend Juanita today to assess needs and offer support.  Patient was A&Ox3 with appropriate and congruent mood and affect.  SW discussed her support sytem in helping with transportation.  Patient stated that she uses [employer] but they have cancelled on her before.  SW provided her with a resource called [employer] and phone number [phone].  Patient's friend mentioned having her family members set her up with [employer].  Patient stated that she will see.  Patient stated that she has family but everyone works.  SW provided business card if she would need anything else.  Patient was appreciative.      Diaz Ortega MSW, LSW

## 2025-02-04 NOTE — PROGRESS NOTES
Tana Hargrove  Female, 79 y.o., 1945  MRN: [id number]  Pathology problems  #1 myelodysplastic syndrome    2.  Chronic anemia due to chronic kidney disease    3.  History of iron deficiency anemia    4.  ESRD on hemodialysis    Interval history  Tana Hargrove presents today for interval follow-up and treatment of anemia secondary MDS and chronic disease.     She has been receiving Procrit 80,000 weekly and tolerating well.  She denies bleeding, nausea vomiting, chest pain, abdominal pain or other new symptoms.   She is complaining of weakness fatigue, general responding very well to Procrit injection and also required RBC transfusion.  Recent CBC did show hemoglobin 7.6 and today CBC did show WBC count 4.0, hemoglobin 7.4, hematocrit 22.4,  and platelets 211        Past medical history: Anemia secondary to MDS (with minor component of myelofibrosis) and chronic disease, diagnosed in January 2013. She  has been maintained on weekly injections of Procrit, 80,000 units.. She has required transfusion intermittently. History also significant for diabetes, lumbar spinal stenosis and degenerative disc disease, hypertension, osteoporosis, GERD, hyperlipidemia,  benign breast disease, gout, hysterectomy, BSO, knee replacement surgeries, back surgery, arthritis, foot surgery, diverticulosis, colon polyp and skin cancers.  COVID-19 infection (11/20/20) .  COVID-19 vaccination (Moderna) February 2021.  [facility] spine  surgery March 2021.  Had Shingrix vaccine 9/2020.  Pneumovax 9/2020.  ESTEE, resolving.     Family medical history: Sister had colon cancer at age 50.        Review of Systems:   Review of Systems:    Constitutional: No fever, chills, night sweats.  Increased fatigue.    Head and neck: No headaches or dizziness.  No pain or stiffness   HEENT: No sore throat or sinusitis.  Hearing is normal and eyesight is good.  Cardiac: No chest pain or palpitations  Respiratory: No increased dyspnea, cough, hemoptysis.  Mild  BERNAL.  GI: Appetite is good and weight stable.  No constipation, diarrhea.  No abdominal pain, nausea or vomiting.  Genitourinary: No frequency or urgency.  No polyuria, dysuria.  Musculoskeletal: Chronic low back pain, knee pain, left foot pain.  Endocrine: History of diabetes; no thyroid disease.  Skin: No rash, skin lesions, itching.  Neuromuscular: no fainting or dizziness.  No history of seizure.  Occasional tremor, spasms and weakness in her extremities                 Allergies and Intolerances:       Allergies:         Demerol HCl: Drug, Unknown, Active         codeine: Drug, Unknown, Active         Vicodin: Drug, Unknown, Active         Ultram: Drug, Unknown, Active         Tape: Environment, Unknown, Active     Outpatient Medication Profile:  * Patient Currently Takes Medications as of 17-Jul-2023 15:52 documented in Structured Notes         sodium polystyrene sulfonate 15 g/60 mL  oral and rectal suspension: Last Dose Taken:  , 60 milliliter(s) orally ONCE but may repeat up to          4 times a day until loose stool. , Start Date: 12-Jul-2023         bumetanide 1 mg oral tablet: Last Dose Taken:  , 1 tab(s) orally once  a day, Start Date: 15-Dec-2022         Lyrica 100 mg oral capsule: Last Dose Taken:  , 1 cap(s) orally 2 times  a day          OARRS LF 11-22-22         60/30, Start Date: 20-May-2022         metoprolol tartrate 25 mg oral tablet: Last Dose Taken:  , 1 tab(s) orally  2 times a day, Start Date: 10-Apr-2022         aspirin 81 mg oral tablet: Last Dose Taken:  , 1 tab(s) orally once a day         pantoprazole 40 mg oral delayed release tablet: Last Dose Taken:  , 1  tab(s) orally once a day         amLODIPine 5 mg oral tablet: Last Dose Taken:  , 1 tab(s) orally once  a day         Vitamin D3 50 mcg (2000 intl units) oral tablet: Last Dose Taken:  ,  1 tab(s) orally once a day         Ventolin HFA 90 mcg/inh inhalation aerosol: Last Dose Taken:  , 2 puff(s)  inhaled 4 times a day, As Needed          allopurinol 100 mg oral tablet: Last Dose Taken:  , 1 tab(s) orally 2  times a day         pravastatin 40 mg oral tablet: Last Dose Taken:  , 1 tab(s) orally once  a day (at bedtime)         Januvia 100 mg oral tablet: Last Dose Taken:  , 1 tab(s) orally once  a day         glimepiride 4 mg oral tablet: Last Dose Taken:  , 1 tab(s) orally once  a day         acetaminophen 500 mg oral tablet: Last Dose Taken:  , 2 tab(s) orally  every 8 hours, As Needed             Medical History:         Anemia: ICD-10: D64.9, Status: Active         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      Social History:   Social Substance History:  ·  Smoking Status never smoker (1)            Vitals and Measurements:   Vitals: Temp: 36.7  HR: 83  RR: 16  BP: 140/66  SPO2%:   94   Measurements: HT(cm): 157.4  WT(kg): NA  BSA: NA   BMI:  NA      Physical Exam:      Constitutional: No acute distress.  Alert and oriented.   Appears chronically ill.   Eyes: PERRL, EOMI, clear sclera.   ENMT: mucous membranes moist, no apparent injury,  no lesions seen   Head/Neck: Unremarkable.  No JVD. Trachea midline.   Respiratory/Thorax: Clear to auscultation. Normal  breath sounds. No wheezes, rales, rhonchi.   Cardiovascular: Regular, rate and rhythm.   Gastrointestinal: Nondistended.  Normal bowel sounds.   Musculoskeletal: ROM intact.  No joint swelling.  Adequate strength. No deformity.   Extremities: Mild ankle edema.   Neurological: Alert and oriented x3. Senses and cranial  nerves grossly intact. No focal motor or sensory deficits noted.   Psychological: Appropriate mood and affect.  Slightly  depressed affect.   Skin: Warm and dry, no rashes, no suspicious lesions.         Lab Results:        2/4/25) 8 d ago  (1/27/25) 2 wk ago  (1/21/25) 3 wk ago  (1/13/25) 3 wk ago  (1/9/25) 1 mo ago  (12/23/24) 2 mo ago  (11/29/24)    WBC  4.4 - 11.3 x10*3/uL 4.0 Low  4.0  Low  3.3 Low  3.1 Low  4.2 Low  3.8 Low  6.0   nRBC  CM CM CM  0.0 R 0.5 High  R   Comment: Not Measured   RBC  4.00 - 5.20 x10*6/uL 2.11 Low  2.25 Low  2.41 Low  1.85 Low  2.10 Low  2.01 Low  1.96 Low    Hemoglobin  12.0 - 16.0 g/dL 7.4 Low  7.6 Low  8.1 Low  6.4 Low Panic  7.1 Low  6.8 Low  6.5 Low Panic    Hematocrit  36.0 - 46.0 % 22.4 Low  24.2 Low  25.7 Low  20.3 Low  23.0 Low  21.2 Low  20.1 Low    MCV  80 - 100 fL 106 High  108 High  107 High  110 High  110 High  106 High  103 High    MCH  26.0 - 34.0 pg 35.1 High  33.8 33.6 34.6 High  33.8 33.8 33.2   MCHC  32.0 - 36.0 g/dL 33.0 31.4 Low  31.5 Low  31.5 Low  30.9 Low  32.1 32.3   RDW  11.5 - 14.5 % 25.1 High  23.6 High  23.4 High  23.7 High  23.9 High  22.6 High  21.3 High    Comment: Dimorphic population precludes RDW-CV   Platelets  150 - 450 x10*3/uL 211              Assessment and Plan:      Assessment and Plan:   Assessment:    1.  Anemia due to myelodysplastic syndrome, renal insufficiency and chronic disease, on Procrit.  She has required transfusions intermittently, especially when hemoglobin  < 7.5.  Hgb today is 6.7. patient is very tired and weak. We scheduled her for a transfusion of 2 units PRBC's tomorrow. She will continue the Procrit 80,000units weekly for now, but I don't think she is responding. She may be at the point of being transfusion  dependent. Ferritin is elevated most likely from frequent transfusions and inflammation. Not sure if she would benefit from chelating agents.     2.  History of hypertension, arthritis, skin cancer, diabetes and other medical problems     3.  Degenerative disease of the spine, knees, hips, etc.      4.  CKD - stage 4, on Procrit as stated above.      5. On July 12, she had potassium of 6.1. K-exalate was ordered by Dr Medina over the weekend. K+ level has improved to 4.8. will keep on a low dose this week and check labs again in 1 week.                         Assessment and Plan:   Assessment:    1.   Anemia due to myelodysplastic syndrome, renal insufficiency and chronic disease, on Procrit.  She has required transfusions intermittently, especially when hemoglobin  < 7.5.  Hgb today is 6.7. patient is very tired and weak. We scheduled her for a transfusion of 2 units PRBC's tomorrow. She will continue the Procrit 80,000units weekly for now, but I don't think she is responding. She may be at the point of being transfusion  dependent. Ferritin is elevated most likely from frequent transfusions and inflammation. Not sure if she would benefit from chelating agents.     2.  History of hypertension, arthritis, skin cancer, diabetes and other medical problems     3.  Degenerative disease of the spine, knees, hips, etc.      4.  CKD - stage 4, on Procrit as stated above.      5. On July 12, she had potassium of 6.1. K-exalate was ordered by Dr Medina over the weekend. K+ level has improved to 4.8. will keep on a low dose this week and check labs again in 1 week.       Plan, patient has chronic anemia due to myelodysplastic syndrome and chronic renal insufficiency.  Patient not responding very well to Procrit injection.  Iron study and B12 level has been stable.  Today hemoglobin 7.4 will make arrangement for RBC transfusion.  I will repeat bone marrow biopsy and aspiration to rule out underlying any other pathology which can explain chronic anemia.  Discussed with patient and she is in agreement and we will schedule for bone marrow biopsy and aspiration during next 4-week.  I will also check LDH reticulocyte count, PNH panel and serum protein electrophoresis.    Follow-up after 4-week

## 2025-02-04 NOTE — PATIENT INSTRUCTIONS
Office visit with Dr. Medina for anemia related to myelodysplastic syndrome and end stage renal disease.     Follow up in 4 weeks with Dr. Medina

## 2025-02-06 ENCOUNTER — INFUSION (OUTPATIENT)
Dept: INFUSION THERAPY | Facility: HOSPITAL | Age: 80
End: 2025-02-06
Payer: MEDICARE

## 2025-02-06 VITALS
SYSTOLIC BLOOD PRESSURE: 181 MMHG | DIASTOLIC BLOOD PRESSURE: 73 MMHG | TEMPERATURE: 98.4 F | RESPIRATION RATE: 20 BRPM | OXYGEN SATURATION: 96 % | HEART RATE: 76 BPM

## 2025-02-06 DIAGNOSIS — D46.9 MYELODYSPLASTIC SYNDROME (MULTI): ICD-10-CM

## 2025-02-06 LAB
BLOOD EXPIRATION DATE: NORMAL
COPPER SERPL-MCNC: 91.7 UG/DL (ref 80–155)
DISPENSE STATUS: NORMAL
PRODUCT BLOOD TYPE: 6200
PRODUCT CODE: NORMAL
UNIT ABO: NORMAL
UNIT NUMBER: NORMAL
UNIT RH: NORMAL
UNIT VOLUME: 350
XM INTEP: NORMAL

## 2025-02-06 PROCEDURE — 36430 TRANSFUSION BLD/BLD COMPNT: CPT

## 2025-02-06 PROCEDURE — P9040 RBC LEUKOREDUCED IRRADIATED: HCPCS

## 2025-02-06 RX ORDER — EPINEPHRINE 0.3 MG/.3ML
0.3 INJECTION SUBCUTANEOUS EVERY 5 MIN PRN
OUTPATIENT
Start: 2025-02-06

## 2025-02-06 RX ORDER — HEPARIN 100 UNIT/ML
500 SYRINGE INTRAVENOUS AS NEEDED
OUTPATIENT
Start: 2025-02-06

## 2025-02-06 RX ORDER — ALBUTEROL SULFATE 0.83 MG/ML
3 SOLUTION RESPIRATORY (INHALATION) AS NEEDED
OUTPATIENT
Start: 2025-02-06

## 2025-02-06 RX ORDER — FAMOTIDINE 10 MG/ML
20 INJECTION INTRAVENOUS ONCE AS NEEDED
OUTPATIENT
Start: 2025-02-06

## 2025-02-06 RX ORDER — HEPARIN SODIUM,PORCINE/PF 10 UNIT/ML
50 SYRINGE (ML) INTRAVENOUS AS NEEDED
OUTPATIENT
Start: 2025-02-06

## 2025-02-06 RX ORDER — DIPHENHYDRAMINE HYDROCHLORIDE 50 MG/ML
50 INJECTION INTRAMUSCULAR; INTRAVENOUS AS NEEDED
OUTPATIENT
Start: 2025-02-06

## 2025-02-06 ASSESSMENT — ENCOUNTER SYMPTOMS
OCCASIONAL FEELINGS OF UNSTEADINESS: 1
DEPRESSION: 0
LOSS OF SENSATION IN FEET: 0

## 2025-02-11 ENCOUNTER — LAB (OUTPATIENT)
Dept: LAB | Facility: HOSPITAL | Age: 80
End: 2025-02-11
Payer: MEDICARE

## 2025-02-11 DIAGNOSIS — D64.9 ANEMIA, UNSPECIFIED TYPE: ICD-10-CM

## 2025-02-11 DIAGNOSIS — D50.9 IRON DEFICIENCY ANEMIA, UNSPECIFIED IRON DEFICIENCY ANEMIA TYPE: ICD-10-CM

## 2025-02-11 LAB
BASOPHILS # BLD AUTO: 0.03 X10*3/UL (ref 0–0.1)
BASOPHILS NFR BLD AUTO: 0.7 %
EOSINOPHIL # BLD AUTO: 0.26 X10*3/UL (ref 0–0.4)
EOSINOPHIL NFR BLD AUTO: 6.2 %
ERYTHROCYTE [DISTWIDTH] IN BLOOD BY AUTOMATED COUNT: 24.2 % (ref 11.5–14.5)
HCT VFR BLD AUTO: 27.4 % (ref 36–46)
HGB BLD-MCNC: 8.8 G/DL (ref 12–16)
HYPOCHROMIA BLD QL SMEAR: NORMAL
IMM GRANULOCYTES # BLD AUTO: 0.01 X10*3/UL (ref 0–0.5)
IMM GRANULOCYTES NFR BLD AUTO: 0.2 % (ref 0–0.9)
LYMPHOCYTES # BLD AUTO: 1.37 X10*3/UL (ref 0.8–3)
LYMPHOCYTES NFR BLD AUTO: 32.5 %
MCH RBC QN AUTO: 34 PG (ref 26–34)
MCHC RBC AUTO-ENTMCNC: 32.1 G/DL (ref 32–36)
MCV RBC AUTO: 106 FL (ref 80–100)
MONOCYTES # BLD AUTO: 0.37 X10*3/UL (ref 0.05–0.8)
MONOCYTES NFR BLD AUTO: 8.8 %
NEUTROPHILS # BLD AUTO: 2.18 X10*3/UL (ref 1.6–5.5)
NEUTROPHILS NFR BLD AUTO: 51.6 %
NRBC BLD-RTO: ABNORMAL /100{WBCS}
OVALOCYTES BLD QL SMEAR: NORMAL
PLATELET # BLD AUTO: 204 X10*3/UL (ref 150–450)
RBC # BLD AUTO: 2.59 X10*6/UL (ref 4–5.2)
RBC MORPH BLD: NORMAL
TARGETS BLD QL SMEAR: NORMAL
WBC # BLD AUTO: 4.2 X10*3/UL (ref 4.4–11.3)

## 2025-02-11 PROCEDURE — 85025 COMPLETE CBC W/AUTO DIFF WBC: CPT

## 2025-02-11 PROCEDURE — 36415 COLL VENOUS BLD VENIPUNCTURE: CPT

## 2025-02-17 ENCOUNTER — LAB (OUTPATIENT)
Dept: LAB | Facility: HOSPITAL | Age: 80
End: 2025-02-17
Payer: MEDICARE

## 2025-02-17 DIAGNOSIS — D46.9 MYELODYSPLASTIC SYNDROME (MULTI): ICD-10-CM

## 2025-02-17 DIAGNOSIS — D64.9 ANEMIA, UNSPECIFIED TYPE: ICD-10-CM

## 2025-02-17 LAB
BASOPHILS # BLD AUTO: 0.02 X10*3/UL (ref 0–0.1)
BASOPHILS NFR BLD AUTO: 0.6 %
DACRYOCYTES BLD QL SMEAR: NORMAL
EOSINOPHIL # BLD AUTO: 0.14 X10*3/UL (ref 0–0.4)
EOSINOPHIL NFR BLD AUTO: 4.1 %
ERYTHROCYTE [DISTWIDTH] IN BLOOD BY AUTOMATED COUNT: 23.5 % (ref 11.5–14.5)
HCT VFR BLD AUTO: 24.1 % (ref 36–46)
HGB BLD-MCNC: 7.7 G/DL (ref 12–16)
HYPOCHROMIA BLD QL SMEAR: NORMAL
IMM GRANULOCYTES # BLD AUTO: 0.01 X10*3/UL (ref 0–0.5)
IMM GRANULOCYTES NFR BLD AUTO: 0.3 % (ref 0–0.9)
LYMPHOCYTES # BLD AUTO: 0.78 X10*3/UL (ref 0.8–3)
LYMPHOCYTES NFR BLD AUTO: 22.8 %
MCH RBC QN AUTO: 34.2 PG (ref 26–34)
MCHC RBC AUTO-ENTMCNC: 32 G/DL (ref 32–36)
MCV RBC AUTO: 107 FL (ref 80–100)
MONOCYTES # BLD AUTO: 0.32 X10*3/UL (ref 0.05–0.8)
MONOCYTES NFR BLD AUTO: 9.4 %
NEUTROPHILS # BLD AUTO: 2.15 X10*3/UL (ref 1.6–5.5)
NEUTROPHILS NFR BLD AUTO: 62.8 %
NRBC BLD-RTO: ABNORMAL /100{WBCS}
OVALOCYTES BLD QL SMEAR: NORMAL
PLATELET # BLD AUTO: 194 X10*3/UL (ref 150–450)
RBC # BLD AUTO: 2.25 X10*6/UL (ref 4–5.2)
RBC MORPH BLD: NORMAL
WBC # BLD AUTO: 3.4 X10*3/UL (ref 4.4–11.3)

## 2025-02-17 PROCEDURE — 36415 COLL VENOUS BLD VENIPUNCTURE: CPT

## 2025-02-17 PROCEDURE — 85025 COMPLETE CBC W/AUTO DIFF WBC: CPT

## 2025-02-20 ENCOUNTER — APPOINTMENT (OUTPATIENT)
Dept: INFUSION THERAPY | Facility: HOSPITAL | Age: 80
End: 2025-02-20
Payer: MEDICARE

## 2025-02-20 DIAGNOSIS — D46.9 MYELODYSPLASTIC SYNDROME (MULTI): ICD-10-CM

## 2025-02-20 LAB
FLOW CYTOMETRY SPECIALIST REVIEW: NORMAL
PATH REVIEW, PNH: NORMAL
PNH RESULTS: NEGATIVE

## 2025-02-20 RX ORDER — DIPHENHYDRAMINE HYDROCHLORIDE 50 MG/ML
50 INJECTION INTRAMUSCULAR; INTRAVENOUS AS NEEDED
Status: CANCELLED | OUTPATIENT
Start: 2025-02-20

## 2025-02-20 RX ORDER — FAMOTIDINE 10 MG/ML
20 INJECTION, SOLUTION INTRAVENOUS ONCE AS NEEDED
Status: CANCELLED | OUTPATIENT
Start: 2025-02-20

## 2025-02-20 RX ORDER — EPINEPHRINE 0.3 MG/.3ML
0.3 INJECTION SUBCUTANEOUS EVERY 5 MIN PRN
Status: CANCELLED | OUTPATIENT
Start: 2025-02-20

## 2025-02-20 RX ORDER — ALBUTEROL SULFATE 0.83 MG/ML
3 SOLUTION RESPIRATORY (INHALATION) AS NEEDED
Status: CANCELLED | OUTPATIENT
Start: 2025-02-20

## 2025-02-24 ENCOUNTER — LAB (OUTPATIENT)
Dept: LAB | Facility: HOSPITAL | Age: 80
End: 2025-02-24
Payer: MEDICARE

## 2025-02-24 DIAGNOSIS — D64.9 ANEMIA, UNSPECIFIED TYPE: ICD-10-CM

## 2025-02-24 DIAGNOSIS — D50.9 IRON DEFICIENCY ANEMIA, UNSPECIFIED IRON DEFICIENCY ANEMIA TYPE: ICD-10-CM

## 2025-02-24 LAB
ABO GROUP (TYPE) IN BLOOD: NORMAL
ANTIBODY SCREEN: NORMAL
BASO STIPL BLD QL SMEAR: PRESENT
BASOPHILS # BLD AUTO: 0.03 X10*3/UL (ref 0–0.1)
BASOPHILS NFR BLD AUTO: 0.9 %
DACRYOCYTES BLD QL SMEAR: NORMAL
EOSINOPHIL # BLD AUTO: 0.19 X10*3/UL (ref 0–0.4)
EOSINOPHIL NFR BLD AUTO: 5.7 %
ERYTHROCYTE [DISTWIDTH] IN BLOOD BY AUTOMATED COUNT: 23.7 % (ref 11.5–14.5)
HCT VFR BLD AUTO: 21.3 % (ref 36–46)
HGB BLD-MCNC: 6.6 G/DL (ref 12–16)
HYPOCHROMIA BLD QL SMEAR: NORMAL
IMM GRANULOCYTES # BLD AUTO: 0.01 X10*3/UL (ref 0–0.5)
IMM GRANULOCYTES NFR BLD AUTO: 0.3 % (ref 0–0.9)
LYMPHOCYTES # BLD AUTO: 0.89 X10*3/UL (ref 0.8–3)
LYMPHOCYTES NFR BLD AUTO: 26.9 %
MCH RBC QN AUTO: 33.3 PG (ref 26–34)
MCHC RBC AUTO-ENTMCNC: 31 G/DL (ref 32–36)
MCV RBC AUTO: 108 FL (ref 80–100)
MONOCYTES # BLD AUTO: 0.33 X10*3/UL (ref 0.05–0.8)
MONOCYTES NFR BLD AUTO: 10 %
NEUTROPHILS # BLD AUTO: 1.86 X10*3/UL (ref 1.6–5.5)
NEUTROPHILS NFR BLD AUTO: 56.2 %
NRBC BLD-RTO: ABNORMAL /100{WBCS}
OVALOCYTES BLD QL SMEAR: NORMAL
PLATELET # BLD AUTO: 189 X10*3/UL (ref 150–450)
POLYCHROMASIA BLD QL SMEAR: NORMAL
RBC # BLD AUTO: 1.98 X10*6/UL (ref 4–5.2)
RBC MORPH BLD: NORMAL
RH FACTOR (ANTIGEN D): NORMAL
WBC # BLD AUTO: 3.3 X10*3/UL (ref 4.4–11.3)

## 2025-02-24 PROCEDURE — 86922 COMPATIBILITY TEST ANTIGLOB: CPT

## 2025-02-24 PROCEDURE — 85025 COMPLETE CBC W/AUTO DIFF WBC: CPT

## 2025-02-24 PROCEDURE — 36415 COLL VENOUS BLD VENIPUNCTURE: CPT

## 2025-02-24 PROCEDURE — 86850 RBC ANTIBODY SCREEN: CPT

## 2025-02-24 PROCEDURE — 86902 BLOOD TYPE ANTIGEN DONOR EA: CPT

## 2025-02-25 RX ORDER — ALBUTEROL SULFATE 0.83 MG/ML
3 SOLUTION RESPIRATORY (INHALATION) AS NEEDED
Status: CANCELLED | OUTPATIENT
Start: 2025-02-25

## 2025-02-25 RX ORDER — EPINEPHRINE 0.3 MG/.3ML
0.3 INJECTION SUBCUTANEOUS EVERY 5 MIN PRN
Status: CANCELLED | OUTPATIENT
Start: 2025-02-25

## 2025-02-25 RX ORDER — DIPHENHYDRAMINE HYDROCHLORIDE 50 MG/ML
50 INJECTION INTRAMUSCULAR; INTRAVENOUS AS NEEDED
Status: CANCELLED | OUTPATIENT
Start: 2025-02-25

## 2025-02-25 RX ORDER — FAMOTIDINE 10 MG/ML
20 INJECTION, SOLUTION INTRAVENOUS ONCE AS NEEDED
Status: CANCELLED | OUTPATIENT
Start: 2025-02-25

## 2025-02-26 ENCOUNTER — TELEPHONE (OUTPATIENT)
Dept: PRIMARY CARE | Facility: CLINIC | Age: 80
End: 2025-02-26
Payer: MEDICARE

## 2025-02-26 DIAGNOSIS — G57.93 NEUROPATHIC PAIN OF BOTH FEET: ICD-10-CM

## 2025-02-26 DIAGNOSIS — M79.18 MYOFASCIAL PAIN SYNDROME: ICD-10-CM

## 2025-02-26 RX ORDER — PREGABALIN 100 MG/1
100 CAPSULE ORAL 2 TIMES DAILY
Qty: 60 CAPSULE | Refills: 0 | Status: SHIPPED | OUTPATIENT
Start: 2025-02-26

## 2025-02-26 NOTE — TELEPHONE ENCOUNTER
Patient called to request new prescription for Rx  (Lyrica) 100 mg capsule stated she has no refills. -Nnamdi Ramos     Patient rescheduled appointment for tomorrow she is in urgent need of a blood transfusion feeling weak     LOV 11/7/2024  NOV 3/6/2025  Cancellations 2/27/2025, No Show 2/4/2025

## 2025-02-27 ENCOUNTER — INFUSION (OUTPATIENT)
Dept: INFUSION THERAPY | Facility: HOSPITAL | Age: 80
End: 2025-02-27
Payer: MEDICARE

## 2025-02-27 ENCOUNTER — APPOINTMENT (OUTPATIENT)
Dept: PRIMARY CARE | Facility: CLINIC | Age: 80
End: 2025-02-27
Payer: MEDICARE

## 2025-02-27 VITALS
HEART RATE: 73 BPM | RESPIRATION RATE: 20 BRPM | TEMPERATURE: 97.9 F | DIASTOLIC BLOOD PRESSURE: 80 MMHG | OXYGEN SATURATION: 94 % | SYSTOLIC BLOOD PRESSURE: 176 MMHG

## 2025-02-27 DIAGNOSIS — D46.9 MYELODYSPLASTIC SYNDROME (MULTI): ICD-10-CM

## 2025-02-27 PROCEDURE — 36430 TRANSFUSION BLD/BLD COMPNT: CPT

## 2025-02-27 PROCEDURE — P9040 RBC LEUKOREDUCED IRRADIATED: HCPCS

## 2025-02-27 RX ORDER — DIPHENHYDRAMINE HYDROCHLORIDE 50 MG/ML
50 INJECTION INTRAMUSCULAR; INTRAVENOUS AS NEEDED
OUTPATIENT
Start: 2025-02-27

## 2025-02-27 RX ORDER — ALBUTEROL SULFATE 0.83 MG/ML
3 SOLUTION RESPIRATORY (INHALATION) AS NEEDED
OUTPATIENT
Start: 2025-02-27

## 2025-02-27 RX ORDER — EPINEPHRINE 0.3 MG/.3ML
0.3 INJECTION SUBCUTANEOUS EVERY 5 MIN PRN
OUTPATIENT
Start: 2025-02-27

## 2025-02-27 RX ORDER — FAMOTIDINE 10 MG/ML
20 INJECTION, SOLUTION INTRAVENOUS ONCE AS NEEDED
OUTPATIENT
Start: 2025-02-27

## 2025-03-03 ENCOUNTER — LAB (OUTPATIENT)
Dept: LAB | Facility: HOSPITAL | Age: 80
End: 2025-03-03
Payer: MEDICARE

## 2025-03-03 DIAGNOSIS — D50.9 IRON DEFICIENCY ANEMIA, UNSPECIFIED IRON DEFICIENCY ANEMIA TYPE: ICD-10-CM

## 2025-03-03 DIAGNOSIS — D64.9 ANEMIA, UNSPECIFIED TYPE: ICD-10-CM

## 2025-03-03 LAB
ABO GROUP (TYPE) IN BLOOD: NORMAL
ANTIBODY SCREEN: NORMAL
ERYTHROCYTE [DISTWIDTH] IN BLOOD BY AUTOMATED COUNT: 20.9 % (ref 11.5–14.5)
HCT VFR BLD AUTO: 27.2 % (ref 36–46)
HGB BLD-MCNC: 8.8 G/DL (ref 12–16)
MCH RBC QN AUTO: 33.1 PG (ref 26–34)
MCHC RBC AUTO-ENTMCNC: 32.4 G/DL (ref 32–36)
MCV RBC AUTO: 102 FL (ref 80–100)
PLATELET # BLD AUTO: 230 X10*3/UL (ref 150–450)
RBC # BLD AUTO: 2.66 X10*6/UL (ref 4–5.2)
RH FACTOR (ANTIGEN D): NORMAL
WBC # BLD AUTO: 3.7 X10*3/UL (ref 4.4–11.3)

## 2025-03-03 PROCEDURE — 36415 COLL VENOUS BLD VENIPUNCTURE: CPT

## 2025-03-03 PROCEDURE — 85027 COMPLETE CBC AUTOMATED: CPT

## 2025-03-03 PROCEDURE — 86901 BLOOD TYPING SEROLOGIC RH(D): CPT

## 2025-03-04 ENCOUNTER — APPOINTMENT (OUTPATIENT)
Dept: HEMATOLOGY/ONCOLOGY | Facility: CLINIC | Age: 80
End: 2025-03-04
Payer: MEDICARE

## 2025-03-04 ENCOUNTER — APPOINTMENT (OUTPATIENT)
Dept: VASCULAR MEDICINE | Facility: HOSPITAL | Age: 80
End: 2025-03-04
Payer: MEDICARE

## 2025-03-06 ENCOUNTER — APPOINTMENT (OUTPATIENT)
Dept: PRIMARY CARE | Facility: CLINIC | Age: 80
End: 2025-03-06
Payer: MEDICARE

## 2025-03-13 ENCOUNTER — APPOINTMENT (OUTPATIENT)
Dept: INFUSION THERAPY | Facility: HOSPITAL | Age: 80
End: 2025-03-13
Payer: MEDICARE

## 2025-03-13 DIAGNOSIS — E78.5 HYPERLIPIDEMIA, UNSPECIFIED HYPERLIPIDEMIA TYPE: Chronic | ICD-10-CM

## 2025-03-13 DIAGNOSIS — I10 HYPERTENSION, ESSENTIAL: Chronic | ICD-10-CM

## 2025-03-13 DIAGNOSIS — E11.9 TYPE 2 DIABETES MELLITUS WITHOUT COMPLICATION, WITHOUT LONG-TERM CURRENT USE OF INSULIN (MULTI): ICD-10-CM

## 2025-03-13 RX ORDER — PIOGLITAZONEHYDROCHLORIDE 15 MG/1
15 TABLET ORAL DAILY
Qty: 30 TABLET | Refills: 0 | Status: SHIPPED | OUTPATIENT
Start: 2025-03-13

## 2025-03-13 RX ORDER — ATORVASTATIN CALCIUM 10 MG/1
10 TABLET, FILM COATED ORAL DAILY
Qty: 30 TABLET | Refills: 0 | Status: SHIPPED | OUTPATIENT
Start: 2025-03-13

## 2025-03-13 RX ORDER — LOSARTAN POTASSIUM 50 MG/1
50 TABLET ORAL DAILY
Qty: 30 TABLET | Refills: 0 | Status: SHIPPED | OUTPATIENT
Start: 2025-03-13

## 2025-03-18 ENCOUNTER — DOCUMENTATION (OUTPATIENT)
Dept: HEMATOLOGY/ONCOLOGY | Facility: HOSPITAL | Age: 80
End: 2025-03-18
Payer: MEDICARE

## 2025-03-18 ENCOUNTER — LAB (OUTPATIENT)
Dept: LAB | Facility: HOSPITAL | Age: 80
DRG: 871 | End: 2025-03-18
Payer: MEDICARE

## 2025-03-18 DIAGNOSIS — D50.9 IRON DEFICIENCY ANEMIA, UNSPECIFIED IRON DEFICIENCY ANEMIA TYPE: ICD-10-CM

## 2025-03-18 DIAGNOSIS — D64.9 ANEMIA, UNSPECIFIED TYPE: ICD-10-CM

## 2025-03-18 LAB
ABO GROUP (TYPE) IN BLOOD: NORMAL
ANTIBODY SCREEN: NORMAL
ERYTHROCYTE [DISTWIDTH] IN BLOOD BY AUTOMATED COUNT: 20.9 % (ref 11.5–14.5)
HCT VFR BLD AUTO: 20.1 % (ref 36–46)
HGB BLD-MCNC: 6.3 G/DL (ref 12–16)
MCH RBC QN AUTO: 32.8 PG (ref 26–34)
MCHC RBC AUTO-ENTMCNC: 31.3 G/DL (ref 32–36)
MCV RBC AUTO: 105 FL (ref 80–100)
PLATELET # BLD AUTO: 201 X10*3/UL (ref 150–450)
RBC # BLD AUTO: 1.92 X10*6/UL (ref 4–5.2)
RH FACTOR (ANTIGEN D): NORMAL
WBC # BLD AUTO: 3.8 X10*3/UL (ref 4.4–11.3)

## 2025-03-18 PROCEDURE — 36415 COLL VENOUS BLD VENIPUNCTURE: CPT

## 2025-03-18 PROCEDURE — 86901 BLOOD TYPING SEROLOGIC RH(D): CPT

## 2025-03-18 PROCEDURE — 85027 COMPLETE CBC AUTOMATED: CPT

## 2025-03-18 PROCEDURE — 86850 RBC ANTIBODY SCREEN: CPT

## 2025-03-19 ENCOUNTER — HOSPITAL ENCOUNTER (INPATIENT)
Facility: HOSPITAL | Age: 80
End: 2025-03-19
Attending: EMERGENCY MEDICINE | Admitting: INTERNAL MEDICINE
Payer: MEDICARE

## 2025-03-19 ENCOUNTER — APPOINTMENT (OUTPATIENT)
Dept: CARDIOLOGY | Facility: HOSPITAL | Age: 80
DRG: 193 | End: 2025-03-19
Payer: MEDICARE

## 2025-03-19 ENCOUNTER — APPOINTMENT (OUTPATIENT)
Dept: DIALYSIS | Facility: HOSPITAL | Age: 80
End: 2025-03-19
Payer: MEDICARE

## 2025-03-19 ENCOUNTER — APPOINTMENT (OUTPATIENT)
Dept: RADIOLOGY | Facility: HOSPITAL | Age: 80
DRG: 193 | End: 2025-03-19
Payer: MEDICARE

## 2025-03-19 DIAGNOSIS — J18.9 PNEUMONIA OF LOWER LOBE DUE TO INFECTIOUS ORGANISM, UNSPECIFIED LATERALITY: ICD-10-CM

## 2025-03-19 DIAGNOSIS — D64.9 ANEMIA, UNSPECIFIED TYPE: ICD-10-CM

## 2025-03-19 DIAGNOSIS — J96.01 ACUTE HYPOXIC RESPIRATORY FAILURE (MULTI): Primary | ICD-10-CM

## 2025-03-19 DIAGNOSIS — R79.89 ELEVATED TROPONIN: ICD-10-CM

## 2025-03-19 DIAGNOSIS — J10.1 INFLUENZA A: ICD-10-CM

## 2025-03-19 LAB
ALBUMIN SERPL BCP-MCNC: 4 G/DL (ref 3.4–5)
ALP SERPL-CCNC: 87 U/L (ref 33–136)
ALT SERPL W P-5'-P-CCNC: 16 U/L (ref 7–45)
ANION GAP BLDV CALCULATED.4IONS-SCNC: 6 MMOL/L (ref 10–25)
ANION GAP SERPL CALC-SCNC: 13 MMOL/L (ref 10–20)
APPEARANCE UR: CLEAR
AST SERPL W P-5'-P-CCNC: 15 U/L (ref 9–39)
ATRIAL RATE: 85 BPM
BACTERIA #/AREA URNS AUTO: ABNORMAL /HPF
BASE EXCESS BLDV CALC-SCNC: 6 MMOL/L (ref -2–3)
BASOPHILS # BLD AUTO: 0.03 X10*3/UL (ref 0–0.1)
BASOPHILS NFR BLD AUTO: 0.7 %
BILIRUB SERPL-MCNC: 1 MG/DL (ref 0–1.2)
BILIRUB UR STRIP.AUTO-MCNC: NEGATIVE MG/DL
BODY TEMPERATURE: 37 DEGREES CELSIUS
BUN SERPL-MCNC: 30 MG/DL (ref 6–23)
CA-I BLDV-SCNC: 1.3 MMOL/L (ref 1.1–1.33)
CALCIUM SERPL-MCNC: 9.4 MG/DL (ref 8.6–10.3)
CARDIAC TROPONIN I PNL SERPL HS: 17 NG/L (ref 0–13)
CARDIAC TROPONIN I PNL SERPL HS: 17 NG/L (ref 0–13)
CHLORIDE BLDV-SCNC: 103 MMOL/L (ref 98–107)
CHLORIDE SERPL-SCNC: 99 MMOL/L (ref 98–107)
CO2 SERPL-SCNC: 29 MMOL/L (ref 21–32)
COLOR UR: ABNORMAL
CREAT SERPL-MCNC: 2.42 MG/DL (ref 0.5–1.05)
EGFRCR SERPLBLD CKD-EPI 2021: 20 ML/MIN/1.73M*2
EOSINOPHIL # BLD AUTO: 0.09 X10*3/UL (ref 0–0.4)
EOSINOPHIL NFR BLD AUTO: 2.1 %
ERYTHROCYTE [DISTWIDTH] IN BLOOD BY AUTOMATED COUNT: 21.3 % (ref 11.5–14.5)
FERRITIN SERPL-MCNC: 3132 NG/ML (ref 8–150)
FLUAV RNA RESP QL NAA+PROBE: DETECTED
FLUBV RNA RESP QL NAA+PROBE: NOT DETECTED
GLUCOSE BLD MANUAL STRIP-MCNC: 113 MG/DL (ref 74–99)
GLUCOSE BLD MANUAL STRIP-MCNC: 164 MG/DL (ref 74–99)
GLUCOSE BLDV-MCNC: 187 MG/DL (ref 74–99)
GLUCOSE SERPL-MCNC: 179 MG/DL (ref 74–99)
GLUCOSE UR STRIP.AUTO-MCNC: ABNORMAL MG/DL
HCO3 BLDV-SCNC: 31.1 MMOL/L (ref 22–26)
HCT VFR BLD AUTO: 20.5 % (ref 36–46)
HCT VFR BLD EST: 20 % (ref 36–46)
HGB BLD-MCNC: 6.7 G/DL (ref 12–16)
HGB BLDV-MCNC: 6.5 G/DL (ref 12–16)
HOLD SPECIMEN: NORMAL
HYPOCHROMIA BLD QL SMEAR: NORMAL
IMM GRANULOCYTES # BLD AUTO: 0.01 X10*3/UL (ref 0–0.5)
IMM GRANULOCYTES NFR BLD AUTO: 0.2 % (ref 0–0.9)
INHALED O2 CONCENTRATION: 28 %
KETONES UR STRIP.AUTO-MCNC: NEGATIVE MG/DL
LACTATE BLDV-SCNC: 1 MMOL/L (ref 0.4–2)
LACTATE SERPL-SCNC: 1.1 MMOL/L (ref 0.4–2)
LEUKOCYTE ESTERASE UR QL STRIP.AUTO: NEGATIVE
LYMPHOCYTES # BLD AUTO: 0.65 X10*3/UL (ref 0.8–3)
LYMPHOCYTES NFR BLD AUTO: 15 %
MCH RBC QN AUTO: 32.5 PG (ref 26–34)
MCHC RBC AUTO-ENTMCNC: 32.7 G/DL (ref 32–36)
MCV RBC AUTO: 100 FL (ref 80–100)
MONOCYTES # BLD AUTO: 0.4 X10*3/UL (ref 0.05–0.8)
MONOCYTES NFR BLD AUTO: 9.2 %
NEUTROPHILS # BLD AUTO: 3.15 X10*3/UL (ref 1.6–5.5)
NEUTROPHILS NFR BLD AUTO: 72.8 %
NITRITE UR QL STRIP.AUTO: NEGATIVE
NRBC BLD-RTO: 0 /100 WBCS (ref 0–0)
OVALOCYTES BLD QL SMEAR: NORMAL
OXYHGB MFR BLDV: 86.7 % (ref 45–75)
P AXIS: 37 DEGREES
PCO2 BLDV: 48 MM HG (ref 41–51)
PH BLDV: 7.42 PH (ref 7.33–7.43)
PH UR STRIP.AUTO: 8 [PH]
PLATELET # BLD AUTO: 206 X10*3/UL (ref 150–450)
PO2 BLDV: 58 MM HG (ref 35–45)
POTASSIUM BLDV-SCNC: 4.4 MMOL/L (ref 3.5–5.3)
POTASSIUM SERPL-SCNC: 4.4 MMOL/L (ref 3.5–5.3)
PR INTERVAL: 171 MS
PROT SERPL-MCNC: 6.4 G/DL (ref 6.4–8.2)
PROT UR STRIP.AUTO-MCNC: ABNORMAL MG/DL
Q ONSET: 252 MS
QRS COUNT: 14 BEATS
QRS DURATION: 86 MS
QT INTERVAL: 375 MS
QTC CALCULATION(BAZETT): 454 MS
QTC FREDERICIA: 426 MS
R AXIS: 22 DEGREES
RBC # BLD AUTO: 2.06 X10*6/UL (ref 4–5.2)
RBC # UR STRIP.AUTO: NEGATIVE MG/DL
RBC #/AREA URNS AUTO: ABNORMAL /HPF
RBC MORPH BLD: NORMAL
RSV RNA RESP QL NAA+PROBE: NOT DETECTED
SAO2 % BLDV: 90 % (ref 45–75)
SARS-COV-2 RNA RESP QL NAA+PROBE: NOT DETECTED
SODIUM BLDV-SCNC: 136 MMOL/L (ref 136–145)
SODIUM SERPL-SCNC: 137 MMOL/L (ref 136–145)
SP GR UR STRIP.AUTO: 1.01
SQUAMOUS #/AREA URNS AUTO: ABNORMAL /HPF
T AXIS: 39 DEGREES
T OFFSET: 439 MS
UROBILINOGEN UR STRIP.AUTO-MCNC: NORMAL MG/DL
VENTRICULAR RATE: 88 BPM
WBC # BLD AUTO: 4.3 X10*3/UL (ref 4.4–11.3)
WBC #/AREA URNS AUTO: ABNORMAL /HPF

## 2025-03-19 PROCEDURE — 83605 ASSAY OF LACTIC ACID: CPT | Performed by: NURSE PRACTITIONER

## 2025-03-19 PROCEDURE — 71046 X-RAY EXAM CHEST 2 VIEWS: CPT

## 2025-03-19 PROCEDURE — 87637 SARSCOV2&INF A&B&RSV AMP PRB: CPT | Performed by: NURSE PRACTITIONER

## 2025-03-19 PROCEDURE — 5A1D70Z PERFORMANCE OF URINARY FILTRATION, INTERMITTENT, LESS THAN 6 HOURS PER DAY: ICD-10-PCS | Performed by: INTERNAL MEDICINE

## 2025-03-19 PROCEDURE — 8010000001 HC DIALYSIS - HEMODIALYSIS PER DAY

## 2025-03-19 PROCEDURE — 84132 ASSAY OF SERUM POTASSIUM: CPT | Performed by: NURSE PRACTITIONER

## 2025-03-19 PROCEDURE — 36415 COLL VENOUS BLD VENIPUNCTURE: CPT | Performed by: NURSE PRACTITIONER

## 2025-03-19 PROCEDURE — 99285 EMERGENCY DEPT VISIT HI MDM: CPT | Mod: 25 | Performed by: EMERGENCY MEDICINE

## 2025-03-19 PROCEDURE — 1200000002 HC GENERAL ROOM WITH TELEMETRY DAILY

## 2025-03-19 PROCEDURE — 84484 ASSAY OF TROPONIN QUANT: CPT | Performed by: NURSE PRACTITIONER

## 2025-03-19 PROCEDURE — 94640 AIRWAY INHALATION TREATMENT: CPT

## 2025-03-19 PROCEDURE — 93005 ELECTROCARDIOGRAM TRACING: CPT

## 2025-03-19 PROCEDURE — 2500000002 HC RX 250 W HCPCS SELF ADMINISTERED DRUGS (ALT 637 FOR MEDICARE OP, ALT 636 FOR OP/ED): Performed by: NURSE PRACTITIONER

## 2025-03-19 PROCEDURE — 82728 ASSAY OF FERRITIN: CPT | Performed by: NURSE PRACTITIONER

## 2025-03-19 PROCEDURE — 2500000002 HC RX 250 W HCPCS SELF ADMINISTERED DRUGS (ALT 637 FOR MEDICARE OP, ALT 636 FOR OP/ED): Performed by: INTERNAL MEDICINE

## 2025-03-19 PROCEDURE — 2500000004 HC RX 250 GENERAL PHARMACY W/ HCPCS (ALT 636 FOR OP/ED): Performed by: NURSE PRACTITIONER

## 2025-03-19 PROCEDURE — 81001 URINALYSIS AUTO W/SCOPE: CPT | Performed by: NURSE PRACTITIONER

## 2025-03-19 PROCEDURE — 99291 CRITICAL CARE FIRST HOUR: CPT | Mod: 25 | Performed by: NURSE PRACTITIONER

## 2025-03-19 PROCEDURE — 2500000001 HC RX 250 WO HCPCS SELF ADMINISTERED DRUGS (ALT 637 FOR MEDICARE OP): Performed by: NURSE PRACTITIONER

## 2025-03-19 PROCEDURE — 99223 1ST HOSP IP/OBS HIGH 75: CPT | Performed by: NURSE PRACTITIONER

## 2025-03-19 PROCEDURE — 82947 ASSAY GLUCOSE BLOOD QUANT: CPT

## 2025-03-19 PROCEDURE — 96372 THER/PROPH/DIAG INJ SC/IM: CPT | Performed by: NURSE PRACTITIONER

## 2025-03-19 PROCEDURE — 85025 COMPLETE CBC W/AUTO DIFF WBC: CPT | Performed by: NURSE PRACTITIONER

## 2025-03-19 RX ORDER — LANOLIN ALCOHOL/MO/W.PET/CERES
1000 CREAM (GRAM) TOPICAL DAILY
Status: DISPENSED | OUTPATIENT
Start: 2025-03-19

## 2025-03-19 RX ORDER — CHOLECALCIFEROL (VITAMIN D3) 25 MCG
2000 TABLET ORAL DAILY
Status: DISPENSED | OUTPATIENT
Start: 2025-03-19

## 2025-03-19 RX ORDER — ALLOPURINOL 100 MG/1
100 TABLET ORAL 2 TIMES DAILY
Status: DISPENSED | OUTPATIENT
Start: 2025-03-19

## 2025-03-19 RX ORDER — GUAIFENESIN 600 MG/1
1200 TABLET, EXTENDED RELEASE ORAL 2 TIMES DAILY
Status: DISPENSED | OUTPATIENT
Start: 2025-03-19

## 2025-03-19 RX ORDER — IPRATROPIUM BROMIDE AND ALBUTEROL SULFATE 2.5; .5 MG/3ML; MG/3ML
3 SOLUTION RESPIRATORY (INHALATION) 3 TIMES DAILY
Status: DISCONTINUED | OUTPATIENT
Start: 2025-03-19 | End: 2025-03-21

## 2025-03-19 RX ORDER — ONDANSETRON HYDROCHLORIDE 2 MG/ML
4 INJECTION, SOLUTION INTRAVENOUS EVERY 8 HOURS PRN
Status: ACTIVE | OUTPATIENT
Start: 2025-03-19

## 2025-03-19 RX ORDER — SEVELAMER CARBONATE 800 MG/1
800 TABLET, FILM COATED ORAL
Status: DISCONTINUED | OUTPATIENT
Start: 2025-03-19 | End: 2025-03-20

## 2025-03-19 RX ORDER — ONDANSETRON 4 MG/1
4 TABLET, ORALLY DISINTEGRATING ORAL EVERY 8 HOURS PRN
Status: ACTIVE | OUTPATIENT
Start: 2025-03-19

## 2025-03-19 RX ORDER — IPRATROPIUM BROMIDE AND ALBUTEROL SULFATE 2.5; .5 MG/3ML; MG/3ML
3 SOLUTION RESPIRATORY (INHALATION) ONCE
Status: COMPLETED | OUTPATIENT
Start: 2025-03-19 | End: 2025-03-19

## 2025-03-19 RX ORDER — PANTOPRAZOLE SODIUM 40 MG/1
40 TABLET, DELAYED RELEASE ORAL
Status: DISPENSED | OUTPATIENT
Start: 2025-03-20

## 2025-03-19 RX ORDER — IPRATROPIUM BROMIDE AND ALBUTEROL SULFATE 2.5; .5 MG/3ML; MG/3ML
3 SOLUTION RESPIRATORY (INHALATION)
Status: DISCONTINUED | OUTPATIENT
Start: 2025-03-19 | End: 2025-03-19

## 2025-03-19 RX ORDER — ACETAMINOPHEN 325 MG/1
650 TABLET ORAL EVERY 4 HOURS PRN
Status: DISPENSED | OUTPATIENT
Start: 2025-03-19

## 2025-03-19 RX ORDER — DEXTROSE 50 % IN WATER (D50W) INTRAVENOUS SYRINGE
25
Status: ACTIVE | OUTPATIENT
Start: 2025-03-19

## 2025-03-19 RX ORDER — BISACODYL 5 MG
10 TABLET, DELAYED RELEASE (ENTERIC COATED) ORAL DAILY PRN
Status: DISPENSED | OUTPATIENT
Start: 2025-03-19

## 2025-03-19 RX ORDER — INSULIN LISPRO 100 [IU]/ML
0-10 INJECTION, SOLUTION INTRAVENOUS; SUBCUTANEOUS
Status: DISPENSED | OUTPATIENT
Start: 2025-03-19

## 2025-03-19 RX ORDER — DEXTROSE 50 % IN WATER (D50W) INTRAVENOUS SYRINGE
12.5
Status: ACTIVE | OUTPATIENT
Start: 2025-03-19

## 2025-03-19 RX ORDER — OSELTAMIVIR PHOSPHATE 30 MG/1
30 CAPSULE ORAL ONCE
Status: COMPLETED | OUTPATIENT
Start: 2025-03-19 | End: 2025-03-19

## 2025-03-19 RX ORDER — LOSARTAN POTASSIUM 50 MG/1
50 TABLET ORAL DAILY
Status: DISPENSED | OUTPATIENT
Start: 2025-03-20

## 2025-03-19 RX ORDER — PREGABALIN 75 MG/1
75 CAPSULE ORAL DAILY
Status: DISPENSED | OUTPATIENT
Start: 2025-03-20

## 2025-03-19 RX ORDER — POLYETHYLENE GLYCOL 3350 17 G/17G
17 POWDER, FOR SOLUTION ORAL DAILY
Status: ACTIVE | OUTPATIENT
Start: 2025-03-19

## 2025-03-19 RX ORDER — TALC
3 POWDER (GRAM) TOPICAL NIGHTLY PRN
Status: DISPENSED | OUTPATIENT
Start: 2025-03-19

## 2025-03-19 RX ORDER — PANTOPRAZOLE SODIUM 40 MG/10ML
40 INJECTION, POWDER, LYOPHILIZED, FOR SOLUTION INTRAVENOUS
Status: DISPENSED | OUTPATIENT
Start: 2025-03-20

## 2025-03-19 RX ORDER — ATORVASTATIN CALCIUM 10 MG/1
10 TABLET, FILM COATED ORAL NIGHTLY
Status: DISPENSED | OUTPATIENT
Start: 2025-03-19

## 2025-03-19 RX ORDER — CARVEDILOL 25 MG/1
25 TABLET ORAL 2 TIMES DAILY
Status: DISPENSED | OUTPATIENT
Start: 2025-03-19

## 2025-03-19 RX ORDER — OSELTAMIVIR PHOSPHATE 30 MG/1
30 CAPSULE ORAL 3 TIMES WEEKLY
Status: DISPENSED | OUTPATIENT
Start: 2025-03-21

## 2025-03-19 RX ORDER — ALBUTEROL SULFATE 0.83 MG/ML
2.5 SOLUTION RESPIRATORY (INHALATION) ONCE
Status: COMPLETED | OUTPATIENT
Start: 2025-03-19 | End: 2025-03-19

## 2025-03-19 RX ORDER — FLUTICASONE PROPIONATE 50 MCG
1 SPRAY, SUSPENSION (ML) NASAL DAILY
Status: DISCONTINUED | OUTPATIENT
Start: 2025-03-19 | End: 2025-03-20

## 2025-03-19 RX ORDER — HEPARIN SODIUM 5000 [USP'U]/ML
5000 INJECTION, SOLUTION INTRAVENOUS; SUBCUTANEOUS EVERY 8 HOURS
Status: DISCONTINUED | OUTPATIENT
Start: 2025-03-19 | End: 2025-03-19

## 2025-03-19 RX ORDER — IPRATROPIUM BROMIDE AND ALBUTEROL SULFATE 2.5; .5 MG/3ML; MG/3ML
3 SOLUTION RESPIRATORY (INHALATION) EVERY 6 HOURS PRN
Status: ACTIVE | OUTPATIENT
Start: 2025-03-19

## 2025-03-19 RX ORDER — PIOGLITAZONEHYDROCHLORIDE 30 MG/1
15 TABLET ORAL DAILY
Status: DISPENSED | OUTPATIENT
Start: 2025-03-19

## 2025-03-19 RX ORDER — AMLODIPINE BESYLATE 10 MG/1
10 TABLET ORAL DAILY
Status: DISPENSED | OUTPATIENT
Start: 2025-03-20

## 2025-03-19 RX ADMIN — ALBUTEROL SULFATE 2.5 MG: 2.5 SOLUTION RESPIRATORY (INHALATION) at 08:38

## 2025-03-19 RX ADMIN — ALLOPURINOL 100 MG: 100 TABLET ORAL at 22:27

## 2025-03-19 RX ADMIN — Medication 1000 MCG: at 12:30

## 2025-03-19 RX ADMIN — ATORVASTATIN CALCIUM 10 MG: 10 TABLET, FILM COATED ORAL at 22:28

## 2025-03-19 RX ADMIN — IPRATROPIUM BROMIDE AND ALBUTEROL SULFATE 3 ML: 2.5; .5 SOLUTION RESPIRATORY (INHALATION) at 12:35

## 2025-03-19 RX ADMIN — CARVEDILOL 25 MG: 25 TABLET, FILM COATED ORAL at 22:28

## 2025-03-19 RX ADMIN — BENZOCAINE AND MENTHOL 1 LOZENGE: 15; 3.6 LOZENGE ORAL at 12:56

## 2025-03-19 RX ADMIN — ALLOPURINOL 100 MG: 100 TABLET ORAL at 12:30

## 2025-03-19 RX ADMIN — ACETAMINOPHEN 650 MG: 325 TABLET ORAL at 14:15

## 2025-03-19 RX ADMIN — ATORVASTATIN CALCIUM 10 MG: 10 TABLET, FILM COATED ORAL at 12:56

## 2025-03-19 RX ADMIN — Medication 2000 UNITS: at 12:30

## 2025-03-19 RX ADMIN — IPRATROPIUM BROMIDE AND ALBUTEROL SULFATE 3 ML: 2.5; .5 SOLUTION RESPIRATORY (INHALATION) at 08:38

## 2025-03-19 RX ADMIN — INSULIN LISPRO 2 UNITS: 100 INJECTION, SOLUTION INTRAVENOUS; SUBCUTANEOUS at 17:05

## 2025-03-19 RX ADMIN — HEPARIN SODIUM 5000 UNITS: 5000 INJECTION INTRAVENOUS; SUBCUTANEOUS at 22:28

## 2025-03-19 RX ADMIN — GUAIFENESIN 1200 MG: 600 TABLET ORAL at 22:27

## 2025-03-19 RX ADMIN — HEPARIN SODIUM 5000 UNITS: 5000 INJECTION INTRAVENOUS; SUBCUTANEOUS at 12:30

## 2025-03-19 RX ADMIN — BISACODYL 10 MG: 5 TABLET, COATED ORAL at 12:30

## 2025-03-19 RX ADMIN — ACETAMINOPHEN 650 MG: 325 TABLET ORAL at 18:54

## 2025-03-19 RX ADMIN — GUAIFENESIN 1200 MG: 600 TABLET ORAL at 12:31

## 2025-03-19 RX ADMIN — OSELTAMAVIR PHOSPHATE 30 MG: 30 CAPSULE ORAL at 12:30

## 2025-03-19 RX ADMIN — IPRATROPIUM BROMIDE AND ALBUTEROL SULFATE 3 ML: 2.5; .5 SOLUTION RESPIRATORY (INHALATION) at 20:19

## 2025-03-19 RX ADMIN — PIOGLITAZONE HYDROCHLORIDE 15 MG: 30 TABLET ORAL at 12:56

## 2025-03-19 RX ADMIN — FLUTICASONE PROPIONATE 1 SPRAY: 50 SPRAY, METERED NASAL at 12:29

## 2025-03-19 SDOH — ECONOMIC STABILITY: TRANSPORTATION INSECURITY: IN THE PAST 12 MONTHS, HAS LACK OF TRANSPORTATION KEPT YOU FROM MEDICAL APPOINTMENTS OR FROM GETTING MEDICATIONS?: NO

## 2025-03-19 SDOH — SOCIAL STABILITY: SOCIAL INSECURITY: HAVE YOU HAD THOUGHTS OF HARMING ANYONE ELSE?: NO

## 2025-03-19 SDOH — ECONOMIC STABILITY: HOUSING INSECURITY: AT ANY TIME IN THE PAST 12 MONTHS, WERE YOU HOMELESS OR LIVING IN A SHELTER (INCLUDING NOW)?: NO

## 2025-03-19 SDOH — ECONOMIC STABILITY: INCOME INSECURITY: IN THE PAST 12 MONTHS HAS THE ELECTRIC, GAS, OIL, OR WATER COMPANY THREATENED TO SHUT OFF SERVICES IN YOUR HOME?: NO

## 2025-03-19 SDOH — SOCIAL STABILITY: SOCIAL INSECURITY: WITHIN THE LAST YEAR, HAVE YOU BEEN HUMILIATED OR EMOTIONALLY ABUSED IN OTHER WAYS BY YOUR PARTNER OR EX-PARTNER?: NO

## 2025-03-19 SDOH — ECONOMIC STABILITY: FOOD INSECURITY: WITHIN THE PAST 12 MONTHS, YOU WORRIED THAT YOUR FOOD WOULD RUN OUT BEFORE YOU GOT THE MONEY TO BUY MORE.: NEVER TRUE

## 2025-03-19 SDOH — ECONOMIC STABILITY: HOUSING INSECURITY: IN THE LAST 12 MONTHS, WAS THERE A TIME WHEN YOU WERE NOT ABLE TO PAY THE MORTGAGE OR RENT ON TIME?: NO

## 2025-03-19 SDOH — ECONOMIC STABILITY: FOOD INSECURITY: WITHIN THE PAST 12 MONTHS, THE FOOD YOU BOUGHT JUST DIDN'T LAST AND YOU DIDN'T HAVE MONEY TO GET MORE.: NEVER TRUE

## 2025-03-19 SDOH — ECONOMIC STABILITY: FOOD INSECURITY: HOW HARD IS IT FOR YOU TO PAY FOR THE VERY BASICS LIKE FOOD, HOUSING, MEDICAL CARE, AND HEATING?: NOT HARD AT ALL

## 2025-03-19 SDOH — SOCIAL STABILITY: SOCIAL INSECURITY: WITHIN THE LAST YEAR, HAVE YOU BEEN AFRAID OF YOUR PARTNER OR EX-PARTNER?: NO

## 2025-03-19 SDOH — ECONOMIC STABILITY: HOUSING INSECURITY: IN THE PAST 12 MONTHS, HOW MANY TIMES HAVE YOU MOVED WHERE YOU WERE LIVING?: 0

## 2025-03-19 SDOH — SOCIAL STABILITY: SOCIAL INSECURITY: WERE YOU ABLE TO COMPLETE ALL THE BEHAVIORAL HEALTH SCREENINGS?: YES

## 2025-03-19 SDOH — SOCIAL STABILITY: SOCIAL INSECURITY: ABUSE: ADULT

## 2025-03-19 ASSESSMENT — LIFESTYLE VARIABLES
HOW OFTEN DO YOU HAVE 6 OR MORE DRINKS ON ONE OCCASION: NEVER
TOTAL SCORE: 0
HAVE YOU EVER FELT YOU SHOULD CUT DOWN ON YOUR DRINKING: NO
EVER FELT BAD OR GUILTY ABOUT YOUR DRINKING: NO
SKIP TO QUESTIONS 9-10: 1
AUDIT-C TOTAL SCORE: 0
HAVE PEOPLE ANNOYED YOU BY CRITICIZING YOUR DRINKING: NO
HOW MANY STANDARD DRINKS CONTAINING ALCOHOL DO YOU HAVE ON A TYPICAL DAY: PATIENT DOES NOT DRINK
HOW OFTEN DO YOU HAVE A DRINK CONTAINING ALCOHOL: NEVER
EVER HAD A DRINK FIRST THING IN THE MORNING TO STEADY YOUR NERVES TO GET RID OF A HANGOVER: NO
AUDIT-C TOTAL SCORE: 0

## 2025-03-19 ASSESSMENT — ACTIVITIES OF DAILY LIVING (ADL)
HEARING - RIGHT EAR: FUNCTIONAL
ADEQUATE_TO_COMPLETE_ADL: YES
WALKS IN HOME: INDEPENDENT
ASSISTIVE_DEVICE: DENTURES LOWER;DENTURES UPPER;WALKER
JUDGMENT_ADEQUATE_SAFELY_COMPLETE_DAILY_ACTIVITIES: YES
DRESSING YOURSELF: INDEPENDENT
TOILETING: INDEPENDENT
GROOMING: INDEPENDENT
FEEDING YOURSELF: INDEPENDENT
BATHING: INDEPENDENT
LACK_OF_TRANSPORTATION: NO
PATIENT'S MEMORY ADEQUATE TO SAFELY COMPLETE DAILY ACTIVITIES?: YES
HEARING - LEFT EAR: FUNCTIONAL

## 2025-03-19 ASSESSMENT — ENCOUNTER SYMPTOMS
COUGH: 1
CHILLS: 1
SORE THROAT: 1
SHORTNESS OF BREATH: 1
HEADACHES: 1
WEAKNESS: 1
FEVER: 1
FATIGUE: 1

## 2025-03-19 ASSESSMENT — COGNITIVE AND FUNCTIONAL STATUS - GENERAL
MOBILITY SCORE: 24
PATIENT BASELINE BEDBOUND: NO
DAILY ACTIVITIY SCORE: 24

## 2025-03-19 ASSESSMENT — PAIN SCALES - GENERAL
PAINLEVEL_OUTOF10: 0 - NO PAIN

## 2025-03-19 ASSESSMENT — PATIENT HEALTH QUESTIONNAIRE - PHQ9
SUM OF ALL RESPONSES TO PHQ9 QUESTIONS 1 & 2: 0
2. FEELING DOWN, DEPRESSED OR HOPELESS: NOT AT ALL
1. LITTLE INTEREST OR PLEASURE IN DOING THINGS: NOT AT ALL

## 2025-03-19 ASSESSMENT — HEART SCORE
TROPONIN: 1-3 TIMES NORMAL LIMIT
RISK FACTORS: >2 RISK FACTORS OR HX OF ATHEROSCLEROTIC DISEASE
HISTORY: SLIGHTLY SUSPICIOUS
ECG: NORMAL
HEART SCORE: 5
AGE: 65+

## 2025-03-19 ASSESSMENT — PAIN - FUNCTIONAL ASSESSMENT: PAIN_FUNCTIONAL_ASSESSMENT: 0-10

## 2025-03-19 NOTE — ED NOTES
Pt states she was due for dialysis today, but was too weak to go. Pt pleasant and cooperative with care. Pt to be admitted, awaiting bed assignment.     Yola Gabriel RN  03/19/25 [id number]

## 2025-03-19 NOTE — PRE-PROCEDURE NOTE
Report from Sending RN:    Report From: Tabitha CAROLINA  Recent Surgery of Procedure: No  Baseline Level of Consciousness (LOC): A & O x 4  Oxygen Use: Yes  Type: NC  Diabetic: Yes  Last BP Med Given Day of Dialysis: See MAR  Last Pain Med Given: See MAR  Lab Tests to be Obtained with Dialysis: No  Blood Transfusion to be Given During Dialysis: No  Available IV Access: Yes  Medications to be Administered During Dialysis: No  Continuous IV Infusion Running: No  Restraints on Currently or in the Last 24 Hours: No  Hand-Off Communication: stable for HD  Dialysis Catheter Dressing: will assess @ bedside  Last Dressing Change: will assess @ bedside

## 2025-03-19 NOTE — H&P
[facility] - GENERAL MEDICINE HISTORY AND PHYSICAL    HISTORY OF PRESENT ILLNESS     History Obtained From (Primary Source): The patient  Collateral History (Secondary Sources): D/w ED    History Of Present Illness (HPI):  Tana Hargrove is a 79 y.o. female with PMHx s/f atrial fibrillation not on anticoagulation therapy, chronic kidney disease on hemodialysis with Dr. Avelar, myelodysplastic syndrome with iron deficiency anemia followed by Dr. Ulloa, hypertension, diabetes, arthritis, coronary artery disease, diastolic heart failure, COPD, paroxysmal atrial fibrillation not on anticoagulation therapy no Watchman device secondary to blood disorder who presented to the hospital complaining of generalized weakness over the last several days.  The patient reports that her hemoglobin was only 6 she did not have her last dialysis session.  She has been having generalized weakness she did have a fall landing down onto the ground without hitting her head.  Patient also has cough shortness of breath.  She is very chilled right now and shivering in the emergency department.  She is not having any abdominal pain nausea vomiting diarrhea no urinary symptoms.  She does have other family members who are having similar symptoms.  In the emergency department patient tested positive for influenza.  Her blood work showed hemoglobin of 6.7.  The patient was subsequently discussed with the ED provider the plan is to admit the patient to continue treatment for influenza and to obtain hematology consultation and consider transfusion for her anemia with hemoglobin of 6.7 she does have a chronically elevated ferritin level due to many transfusions.  Due to the comorbidities present with the blood dyscrasia and underlying COPD with superimposed influenza patient will likely need more than 2 midnights hospital stay for full evaluation and workup.      ED Course (Summary - please note all labs, imaging studies, and interventions  noted below have been personally reviewed and/or interpreted on day of admission):   Vitals on presentation: T 37.6 °C (99.7 °F)  HR 98  /73  RR (!) 22  O2 (!) 87 % None (Room air)  Labs:   CBC with WBC 4.3, Hgb 6.7, Plts 206.   CMP with glucose 179, Na 137, K 4.4, BUN 30, sCr 2.42, alk phos 87, ALT 16, AST 15, bilirubin 1.0. Magnesium 1.67.   . Trop 17.   Lactate 1.1  EKG: The EKG shows normal sinus rhythm no acute ST wave segment elevation to suggest ischemia no intraventricular conduction delay.  Imaging: Chest x-ray shows unchanged stable right hemidiaphragmatic elevation no acute pulmonary findings  Interventions: Patient was given nebulizer treatments and referred for admission    12-point ROS reviewed and found to be negative aside from aforementioned positives in HPI and/or noted in dedicated ROS section below.     LABS AND IMAGING     ED Course (From ED Provider):  Diagnoses as of 03/19/25 1208   Acute hypoxic respiratory failure (Multi)   Influenza A   Anemia, unspecified type   Elevated troponin     Relevant Results  Results for orders placed or performed during the hospital encounter of 03/19/25 (from the past 24 hours)   CBC and Auto Differential   Result Value Ref Range    WBC 4.3 (L) 4.4 - 11.3 x10*3/uL    nRBC 0.0 0.0 - 0.0 /100 WBCs    RBC 2.06 (L) 4.00 - 5.20 x10*6/uL    Hemoglobin 6.7 (L) 12.0 - 16.0 g/dL    Hematocrit 20.5 (L) 36.0 - 46.0 %     80 - 100 fL    MCH 32.5 26.0 - 34.0 pg    MCHC 32.7 32.0 - 36.0 g/dL    RDW 21.3 (H) 11.5 - 14.5 %    Platelets 206 150 - 450 x10*3/uL    Neutrophils % 72.8 40.0 - 80.0 %    Immature Granulocytes %, Automated 0.2 0.0 - 0.9 %    Lymphocytes % 15.0 13.0 - 44.0 %    Monocytes % 9.2 2.0 - 10.0 %    Eosinophils % 2.1 0.0 - 6.0 %    Basophils % 0.7 0.0 - 2.0 %    Neutrophils Absolute 3.15 1.60 - 5.50 x10*3/uL    Immature Granulocytes Absolute, Automated 0.01 0.00 - 0.50 x10*3/uL    Lymphocytes Absolute 0.65 (L) 0.80 - 3.00 x10*3/uL     Monocytes Absolute 0.40 0.05 - 0.80 x10*3/uL    Eosinophils Absolute 0.09 0.00 - 0.40 x10*3/uL    Basophils Absolute 0.03 0.00 - 0.10 x10*3/uL   Comprehensive metabolic panel   Result Value Ref Range    Glucose 179 (H) 74 - 99 mg/dL    Sodium 137 136 - 145 mmol/L    Potassium 4.4 3.5 - 5.3 mmol/L    Chloride 99 98 - 107 mmol/L    Bicarbonate 29 21 - 32 mmol/L    Anion Gap 13 10 - 20 mmol/L    Urea Nitrogen 30 (H) 6 - 23 mg/dL    Creatinine 2.42 (H) 0.50 - 1.05 mg/dL    eGFR 20 (L) >60 mL/min/1.73m*2    Calcium 9.4 8.6 - 10.3 mg/dL    Albumin 4.0 3.4 - 5.0 g/dL    Alkaline Phosphatase 87 33 - 136 U/L    Total Protein 6.4 6.4 - 8.2 g/dL    AST 15 9 - 39 U/L    Bilirubin, Total 1.0 0.0 - 1.2 mg/dL    ALT 16 7 - 45 U/L   Lactate   Result Value Ref Range    Lactate 1.1 0.4 - 2.0 mmol/L   Blood Gas Venous Full Panel   Result Value Ref Range    POCT pH, Venous 7.42 7.33 - 7.43 pH    POCT pCO2, Venous 48 41 - 51 mm Hg    POCT pO2, Venous 58 (H) 35 - 45 mm Hg    POCT SO2, Venous 90 (H) 45 - 75 %    POCT Oxy Hemoglobin, Venous 86.7 (H) 45.0 - 75.0 %    POCT Hematocrit Calculated, Venous 20.0 (L) 36.0 - 46.0 %    POCT Sodium, Venous 136 136 - 145 mmol/L    POCT Potassium, Venous 4.4 3.5 - 5.3 mmol/L    POCT Chloride, Venous 103 98 - 107 mmol/L    POCT Ionized Calicum, Venous 1.30 1.10 - 1.33 mmol/L    POCT Glucose, Venous 187 (H) 74 - 99 mg/dL    POCT Lactate, Venous 1.0 0.4 - 2.0 mmol/L    POCT Base Excess, Venous 6.0 (H) -2.0 - 3.0 mmol/L    POCT HCO3 Calculated, Venous 31.1 (H) 22.0 - 26.0 mmol/L    POCT Hemoglobin, Venous 6.5 (LL) 12.0 - 16.0 g/dL    POCT Anion Gap, Venous 6.0 (L) 10.0 - 25.0 mmol/L    Patient Temperature 37.0 degrees Celsius    FiO2 28 %   Sars-CoV-2 PCR   Result Value Ref Range    Coronavirus 2019, PCR Not Detected Not Detected   Influenza A, and B PCR   Result Value Ref Range    Flu A Result Detected (A) Not Detected    Flu B Result Not Detected Not Detected   RSV PCR   Result Value Ref Range    RSV PCR  Not Detected Not Detected   Troponin I, High Sensitivity, Initial   Result Value Ref Range    Troponin I, High Sensitivity 17 (H) 0 - 13 ng/L   Morphology   Result Value Ref Range    RBC Morphology See Below     Hypochromia Mild     Ovalocytes Few    Troponin, High Sensitivity, 1 Hour   Result Value Ref Range    Troponin I, High Sensitivity 17 (H) 0 - 13 ng/L     *Note: Due to a large number of results and/or encounters for the requested time period, some results have not been displayed. A complete set of results can be found in Results Review.      XR chest 2 views    Result Date: 3/19/2025  Interpreted By:  Pauline Adame, STUDY: XR CHEST 2 VIEWS;  3/19/2025 9:16 am   INDICATION: Signs/Symptoms:fever and cough.     COMPARISON: 09/20/2024   ACCESSION NUMBER(S): [id number]   ORDERING CLINICIAN: MERARY HERNANDEZ   FINDINGS: AP upright and lateral views were obtained with the patient sitting.   Right internal jugular dual lumen dialysis catheter tip projects in the right atrium.   CARDIOMEDIASTINAL SILHOUETTE: Cardiac silhouette is enlarged and appears larger than on the previous exam. This may be accentuated by the very shallow inspiration.   LUNGS: Right hemidiaphragm is elevated relative to the left hemidiaphragm, not significantly changed from the prior study. Overall the lung volumes are shallow. There is no focal consolidation noted. No pleural effusion is identified.   ABDOMEN: No remarkable upper abdominal findings.   BONES: No acute osseous changes.       1.  Shallow inspiration with no acute pulmonary finding 2. Unchanged elevation of the right hemidiaphragm 3. Cardiomegaly appears greater than on the prior study       MACRO: None   Signed by: Pauline Adame 3/19/2025 9:51 AM Dictation workstation:   [id number]      PAST HISTORIES AND ALLERGIES     Past Medical History  She has a past medical history of Abnormal levels of other serum enzymes, Acute kidney failure, Anemia, CKD (chronic kidney disease),  COPD (chronic obstructive pulmonary disease) (Multi), Coronary artery disease, Disease of blood and blood-forming organs, unspecified, HLD (hyperlipidemia), Hypertension, MDS (myelodysplastic syndrome) (Multi), Personal history of other diseases of the musculoskeletal system and connective tissue, Personal history of other specified conditions, Personal history of other specified conditions, Type 2 diabetes mellitus, and Urinary tract infection, site not specified (10/17/2024).    She has no past medical history of Adverse effect of anesthesia, Arthritis, Asthma, Awareness under anesthesia, CHF (congestive heart failure), Delayed emergence from general anesthesia, Disease of thyroid gland, Hard to intubate, History of transfusion, Malignant hyperthermia, PONV (postoperative nausea and vomiting), Pseudocholinesterase deficiency, Sickle cell anemia (Multi), Spinal headache, or Stroke (Multi).    Surgical History  She has a past surgical history that includes Other surgical history (12/13/2019); Other surgical history (12/13/2019); Other surgical history (Bilateral, 12/13/2019); Other surgical history (Left, 12/13/2019); Other surgical history (12/13/2019); Other surgical history (12/13/2019); Other surgical history (Right, 12/13/2019); Other surgical history (04/16/2021); MR angio head wo IV contrast (11/20/2020); MR angio neck wo IV contrast (11/20/2020); Breast biopsy (Left); Hysterectomy; Appendectomy; Colonoscopy; Oophorectomy; Joint replacement; and Cholecystectomy (06/06/2024).     Social History  She reports that she has never smoked. She has never been exposed to tobacco smoke. She has never used smokeless tobacco. She reports that she does not currently use alcohol. She reports that she does not use drugs.    Family History  Family History   Problem Relation Name Age of Onset    Heart disease Mother      Heart attack Father      Hodgkin's lymphoma Son         Allergies  Codeine, Hydrocodone,  Hydrocodone-acetaminophen, Tramadol, Piperacillin-tazobactam, Adhesive tape-silicones, and Meperidine    MEDICATIONS     Scheduled Medications:  allopurinol, 100 mg, oral, BID  amLODIPine, 10 mg, oral, Daily  atorvastatin, 10 mg, oral, Nightly  carvedilol, 25 mg, oral, BID  cholecalciferol, 2,000 Units, oral, Daily  cyanocobalamin, 1,000 mcg, oral, Daily  fluticasone, 1 spray, Each Nostril, Daily  guaiFENesin, 1,200 mg, oral, BID  heparin (porcine), 5,000 Units, subcutaneous, q8h  ipratropium-albuteroL, 3 mL, nebulization, q6h  losartan, 50 mg, oral, Daily  oseltamivir, 30 mg, oral, Once  [START ON 3/21/2025] oseltamivir, 30 mg, oral, Once per day on Monday Wednesday Friday  [START ON 3/20/2025] pantoprazole, 40 mg, oral, Daily before breakfast   Or  [START ON 3/20/2025] pantoprazole, 40 mg, intravenous, Daily before breakfast  pioglitazone, 15 mg, oral, Daily  polyethylene glycol, 17 g, oral, Daily  pregabalin, 100 mg, oral, BID      Continuous Medications:     PRN Medications:  PRN medications: acetaminophen, benzocaine-menthol, bisacodyl, melatonin, ondansetron ODT **OR** ondansetron     REVIEW OF SYSTEMS     Review of Systems   Constitutional:  Positive for chills, fatigue and fever.   HENT:  Positive for sore throat.    Respiratory:  Positive for cough and shortness of breath.    Neurological:  Positive for weakness and headaches.       OBJECTIVE     Last Recorded Vitals  /80   Pulse 91   Temp 37.6 °C (99.7 °F) (Oral)   Resp (!) 29   Wt 70 kg (154 lb 5.2 oz)   SpO2 98%      Physical Exam:  Vital signs and nursing notes reviewed.   Constitutional: Pleasant and cooperative. Laying in bed in no acute distress. Conversant.   Skin: Warm and dry; no obvious lesions, rashes, pallor, or jaundice.   Eyes: EOMI. Anicteric sclera.   ENT: Mucous membranes moist; no obvious injury or deformity appreciated.   Head and Neck: Normocephalic, atraumatic. ROM preserved. Trachea midline. No appreciable JVD.    Respiratory: Patient is slightly labored on inspiration with symmetrical chest rise bilateral rhonchi and wheeze appreciated   cardiovascular: RRR. No gross murmur, gallop, or rub. Extremities are warm and well-perfused with good capillary refill (< 3 seconds). No chest wall tenderness.   GI: Abdomen soft, nontender, nondistended. No obvious organomegaly appreciated. Bowel sounds are present.  : No CVA tenderness.   MSK: No gross abnormalities appreciated. No limitations to AROM/PROM appreciated.   Extremities: No cyanosis, edema, or clubbing evident. Neurovascularly intact.   Neuro: A&Ox3. CN 2-12 grossly intact. Able to respond to questions appropriately and clearly. No acute focal neurologic deficits appreciated.  Psych: Appropriate mood and behavior.    ASSESSMENT AND PLAN   Assessment/Plan     79 y.o. female with PMHx s/f atrial fibrillation not on anticoagulation therapy, chronic kidney disease on hemodialysis with Dr. Avelar, myelodysplastic syndrome with iron deficiency anemia followed by Dr. Ulloa, hypertension, diabetes, arthritis, coronary artery disease, diastolic heart failure, COPD, paroxysmal atrial fibrillation not on anticoagulation therapy no Watchman device secondary to blood disorder who presented to the hospital complaining of generalized weakness over the last several days.      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  Patient normally dialyzed on Tuesdays Thursdays and Saturday  but missed dialysis yesterday  Consult is placed to nephrology for dialysis    Coronary artery disease, diastolic CHF, atrial fibrillation  Patient will be resumed on her normal cardiac medications including her carvedilol amlodipine  She is not complaining of any chest pain her heart rate is controlled  The patient is not on anticoagulation therapy and has not had a Watchman placed due to her severe blood dyscrasias and inability to tolerate blood thinners    Diabetes  Placed on sliding scale insulin coverage           Jose Miguel Armendariz, APRN-CNP    Dragon dictation software was used to dictate this note and thus there may be minor errors in translation/transcription including garbled speech or misspellings. Please contact for clarification if needed.

## 2025-03-19 NOTE — ED NOTES
Pt arrives to ED via ambulance from home with c/o weakness and frequent falls.    Code Status:  Full Code    HPI     Chief Complaint   Patient presents with    Weakness, Gen       /57 (BP Location: Right arm, Patient Position: Sitting)   Pulse 89   Temp 37.6 °C (99.7 °F) (Oral)   Resp 20   Wt 70 kg (154 lb 5.2 oz)   SpO2 97%     [place] Coma Scale Score: 15      LDA:   Peripheral IV 03/19/25 20 G Left;Proximal;Anterior Forearm (Active)   Placement Date/Time: 03/19/25 0816   Hand Hygiene Completed: Yes  Size (Gauge): 20 G  Orientation: Left;Proximal;Anterior  Location: Forearm  Site Prep: Chlorhexidine   Insertion attempts: 1   Number of days: 0       External Urinary Catheter Female (Active)   Placement Date/Time: 03/19/25 0829   Placed by: Yola Gabriel RN  Hand Hygiene Completed: Yes  External Catheter Type: Female   Number of days: 0       Hemodialysis Cath 09/20/23 Right Subclavian (Active)   Placement Date/Time: (c) 09/20/23 0000   Orientation: Right  Access Location: Subclavian   Number of days: 546        BACKGROUND  Past Medical History:   Diagnosis Date    Abnormal levels of other serum enzymes     Elevated liver enzymes    Acute kidney failure     Anemia     CKD (chronic kidney disease)     COPD (chronic obstructive pulmonary disease) (Multi)     Coronary artery disease     Disease of blood and blood-forming organs, unspecified     Bone marrow disorder    HLD (hyperlipidemia)     Hypertension     MDS (myelodysplastic syndrome) (Multi)     Personal history of other diseases of the musculoskeletal system and connective tissue     History of muscle pain    Personal history of other specified conditions     History of insomnia    Personal history of other specified conditions     History of edema    Type 2 diabetes mellitus     Urinary tract infection, site not specified 10/17/2024     Past Surgical History:   Procedure Laterality Date    APPENDECTOMY      BREAST BIOPSY Left     left excisional     CHOLECYSTECTOMY  06/06/2024    partial cholecystectomy    COLONOSCOPY      HYSTERECTOMY      JOINT REPLACEMENT      MR HEAD ANGIO WO IV CONTRAST  11/20/2020    MR HEAD ANGIO WO IV CONTRAST 11/20/2020 [facility]    MR NECK ANGIO WO IV CONTRAST  11/20/2020    MR NECK ANGIO WO IV CONTRAST 11/20/2020 [facility]    OOPHORECTOMY      OTHER SURGICAL HISTORY  12/13/2019    Oophorectomy bilateral    OTHER SURGICAL HISTORY  12/13/2019    Tubal ligation    OTHER SURGICAL HISTORY Bilateral 12/13/2019    Knee replacement    OTHER SURGICAL HISTORY Left 12/13/2019    Shoulder surgery    OTHER SURGICAL HISTORY  12/13/2019    Hysterectomy    OTHER SURGICAL HISTORY  12/13/2019    Lumpectomy    OTHER SURGICAL HISTORY Right 12/13/2019    Foot surgery    OTHER SURGICAL HISTORY  04/16/2021    Back surgery     No current facility-administered medications on file prior to encounter.     Current Outpatient Medications on File Prior to Encounter   Medication Sig Dispense Refill    allopurinol (Zyloprim) 100 mg tablet Take 1 tablet (100 mg) by mouth every 12 hours.      amLODIPine (Norvasc) 10 mg tablet Take 1 tablet (10 mg) by mouth once daily.      atorvastatin (Lipitor) 10 mg tablet TAKE ONE TABLET BY MOUTH ONCE DAILY 30 tablet 0    carvedilol (Coreg) 25 mg tablet Take 1 tablet (25 mg) by mouth 2 times a day. 60 tablet 3    cholecalciferol (Vitamin D-3) 50 MCG (2000 UT) tablet Take 1 tablet (2,000 Units) by mouth once daily.      cyanocobalamin (Vitamin B-12) 1,000 mcg tablet Take 1 tablet (1,000 mcg) by mouth once daily.      fluticasone (Flonase) 50 mcg/actuation nasal spray Administer 1 spray into each nostril once daily.      losartan (Cozaar) 50 mg tablet TAKE ONE TABLET BY MOUTH ONCE DAILY 30 tablet 0    pioglitazone (Actos) 15 mg tablet TAKE ONE TABLET BY MOUTH ONCE DAILY 30 tablet 0    pregabalin (Lyrica) 100 mg capsule Take 1 capsule (100 mg) by mouth 2 times a day. 60 capsule 0    sevelamer carbonate (Renvela)  800 mg tablet Take by mouth.      [DISCONTINUED] atorvastatin (Lipitor) 10 mg tablet Take 1 tablet (10 mg) by mouth once daily. 30 tablet 3    [DISCONTINUED] losartan (Cozaar) 50 mg tablet Take 1 tablet (50 mg) by mouth once daily. 30 tablet 3    [DISCONTINUED] pioglitazone (Actos) 15 mg tablet Take 1 tablet (15 mg) by mouth once daily. 30 tablet 1        ASSESSMENT       Medications Currently Running:       Medications Given:  ED Medication Administration from 03/19/2025 0758 to 03/19/2025 1106         Date/Time Order Dose Route Action Action by     03/19/2025 0838 EDT albuterol 2.5 mg /3 mL (0.083 %) nebulizer solution 2.5 mg 2.5 mg nebulization Given SANTY Gabriel     03/19/2025 0838 EDT ipratropium-albuteroL (Duo-Neb) 0.5-2.5 mg/3 mL nebulizer solution 3 mL 3 mL nebulization Given SANTY Gabriel                 RESULTS    Imaging:  XR chest 2 views   Final Result   1.  Shallow inspiration with no acute pulmonary finding   2. Unchanged elevation of the right hemidiaphragm   3. Cardiomegaly appears greater than on the prior study                  MACRO:   None        Signed by: Pauline Adame 3/19/2025 9:51 AM   Dictation workstation:   [id number]         }  Labs ::99  Abnormal Labs Reviewed   CBC WITH AUTO DIFFERENTIAL - Abnormal; Notable for the following components:       Result Value    WBC 4.3 (*)     RBC 2.06 (*)     Hemoglobin 6.7 (*)     Hematocrit 20.5 (*)     RDW 21.3 (*)     Lymphocytes Absolute 0.65 (*)     All other components within normal limits   COMPREHENSIVE METABOLIC PANEL - Abnormal; Notable for the following components:    Glucose 179 (*)     Urea Nitrogen 30 (*)     Creatinine 2.42 (*)     eGFR 20 (*)     All other components within normal limits   BLOOD GAS VENOUS FULL PANEL - Abnormal; Notable for the following components:    POCT pO2, Venous 58 (*)     POCT SO2, Venous 90 (*)     POCT Oxy Hemoglobin, Venous 86.7 (*)     POCT Hematocrit Calculated, Venous 20.0 (*)     POCT Glucose, Venous 187  (*)     POCT Base Excess, Venous 6.0 (*)     POCT HCO3 Calculated, Venous 31.1 (*)     POCT Hemoglobin, Venous 6.5 (*)     POCT Anion Gap, Venous 6.0 (*)     All other components within normal limits   INFLUENZA A AND B PCR - Abnormal; Notable for the following components:    Flu A Result Detected (*)     All other components within normal limits    Narrative:     This assay is an in vitro diagnostic multiplex nucleic acid amplification test for the detection and discrimination of Influenza A & B from nasopharyngeal specimens, and has been validated for use at [facility]. Negative results do not preclude Influenza A/B infections, and should not be used as the sole basis for diagnosis, treatment, or other management decisions. If Influenza A/B and RSV PCR results are negative, testing for Parainfluenza virus, Adenovirus and Metapneumovirus is routinely performed for [facility] pediatric oncology and intensive care inpatients, and is available on other patients by placing an add-on request.   SERIAL TROPONIN-INITIAL - Abnormal; Notable for the following components:    Troponin I, High Sensitivity 17 (*)     All other components within normal limits    Narrative:     Less than 99th percentile of normal range cutoff-                  Female and children under 18 years old <14 ng/L; Male <21 ng/L: Negative                  Repeat testing should be performed if clinically indicated.                                     Female and children under 18 years old 14-50 ng/L; Male 21-50 ng/L:                  Consistent with possible cardiac damage and possible increased clinical                   risk. Serial measurements may help to assess extent of myocardial damage.                                     >50 ng/L: Consistent with cardiac damage, increased clinical risk and                  myocardial infarction. Serial measurements may help assess extent of                   myocardial damage.                                       NOTE: Children less than 1 year old may have higher baseline troponin                   levels and results should be interpreted in conjunction with the overall                   clinical context.                                     NOTE: Troponin I testing is performed using a different                   testing methodology at [facility] than at other                   [facility]. Direct result comparisons should only                   be made within the same method.   SERIAL TROPONIN, 1 HOUR - Abnormal; Notable for the following components:    Troponin I, High Sensitivity 17 (*)     All other components within normal limits    Narrative:     Less than 99th percentile of normal range cutoff-                  Female and children under 18 years old <14 ng/L; Male <21 ng/L: Negative                  Repeat testing should be performed if clinically indicated.                                     Female and children under 18 years old 14-50 ng/L; Male 21-50 ng/L:                  Consistent with possible cardiac damage and possible increased clinical                   risk. Serial measurements may help to assess extent of myocardial damage.                                     >50 ng/L: Consistent with cardiac damage, increased clinical risk and                  myocardial infarction. Serial measurements may help assess extent of                   myocardial damage.                                      NOTE: Children less than 1 year old may have higher baseline troponin                   levels and results should be interpreted in conjunction with the overall                   clinical context.                                     NOTE: Troponin I testing is performed using a different                   testing methodology at [facility] than at other                   [facility]. Direct result comparisons should only                   be made within the same  method.                 Yola Gabriel RN  03/19/25 1107

## 2025-03-19 NOTE — ED PROVIDER NOTES
Chief Complaint   Patient presents with    Weakness, Gen       HPI       79 year old female presents to the Emergency Department today complaining of generalized weakness over the past several days. Notes that she was weak to the point that she fell while getting back into bed and landed on the ground. Denies hitting their head, loss of consciousness, or seizure-like activity. Denies any other injuries. Notes that she has had some nasal congestion, postnasal drip, and nonproductive cough during this time frame. ESRD on dialysis on Tuesday, Thursday, and Saturday, but did not receive this am secondary to such. States that her hemoglobin was 6.0 yesterday. Denies any associated fever, chills, headache, neck pain, chest pain, shortness of breath, abdominal pain, nausea, vomiting, diarrhea, constipation, hematemesis, hematochezia, melena, or urinary symptoms. Exposed to a family member who was having similar URI symptoms.       History provided by:  Patient             Patient History   Past Medical History:   Diagnosis Date    Abnormal levels of other serum enzymes     Elevated liver enzymes    Acute kidney failure     Anemia     CKD (chronic kidney disease)     COPD (chronic obstructive pulmonary disease) (Multi)     Coronary artery disease     Disease of blood and blood-forming organs, unspecified     Bone marrow disorder    HLD (hyperlipidemia)     Hypertension     MDS (myelodysplastic syndrome) (Multi)     Personal history of other diseases of the musculoskeletal system and connective tissue     History of muscle pain    Personal history of other specified conditions     History of insomnia    Personal history of other specified conditions     History of edema    Type 2 diabetes mellitus     Urinary tract infection, site not specified 10/17/2024     Past Surgical History:   Procedure Laterality Date    APPENDECTOMY      BREAST BIOPSY Left     left excisional    CHOLECYSTECTOMY  06/06/2024    partial cholecystectomy     COLONOSCOPY      HYSTERECTOMY      JOINT REPLACEMENT      MR HEAD ANGIO WO IV CONTRAST  11/20/2020    MR HEAD ANGIO WO IV CONTRAST 11/20/2020 [facility]    MR NECK ANGIO WO IV CONTRAST  11/20/2020    MR NECK ANGIO WO IV CONTRAST 11/20/2020 [facility]    OOPHORECTOMY      OTHER SURGICAL HISTORY  12/13/2019    Oophorectomy bilateral    OTHER SURGICAL HISTORY  12/13/2019    Tubal ligation    OTHER SURGICAL HISTORY Bilateral 12/13/2019    Knee replacement    OTHER SURGICAL HISTORY Left 12/13/2019    Shoulder surgery    OTHER SURGICAL HISTORY  12/13/2019    Hysterectomy    OTHER SURGICAL HISTORY  12/13/2019    Lumpectomy    OTHER SURGICAL HISTORY Right 12/13/2019    Foot surgery    OTHER SURGICAL HISTORY  04/16/2021    Back surgery     Family History   Problem Relation Name Age of Onset    Heart disease Mother      Heart attack Father      Hodgkin's lymphoma Son       Social History     Tobacco Use    Smoking status: Never     Passive exposure: Never    Smokeless tobacco: Never   Vaping Use    Vaping status: Never Used   Substance Use Topics    Alcohol use: Not Currently    Drug use: Never           Physical Exam  Constitutional:       Appearance: Normal appearance.   HENT:      Head: Normocephalic.      Right Ear: Tympanic membrane, ear canal and external ear normal.      Left Ear: Tympanic membrane, ear canal and external ear normal.      Nose: Nose normal.      Mouth/Throat:      Mouth: Mucous membranes are moist.      Pharynx: Oropharynx is clear. No oropharyngeal exudate or posterior oropharyngeal erythema.   Eyes:      Conjunctiva/sclera: Conjunctivae normal.      Pupils: Pupils are equal, round, and reactive to light.      Comments: Pale conjunctiva     Cardiovascular:      Rate and Rhythm: Normal rate and regular rhythm.      Pulses:           Radial pulses are 3+ on the right side and 3+ on the left side.        Dorsalis pedis pulses are 3+ on the right side and 3+ on the left side.       Heart sounds: Normal heart sounds. No murmur heard.     No friction rub. No gallop.   Pulmonary:      Effort: Pulmonary effort is normal. No respiratory distress.      Breath sounds: Examination of the right-upper field reveals wheezing. Examination of the left-upper field reveals wheezing. Examination of the right-middle field reveals wheezing. Examination of the left-middle field reveals wheezing. Examination of the right-lower field reveals wheezing. Examination of the left-lower field reveals wheezing. Wheezing present. No rhonchi or rales.   Abdominal:      General: Abdomen is flat. Bowel sounds are normal.      Palpations: Abdomen is soft.      Tenderness: There is no abdominal tenderness. There is no right CVA tenderness, left CVA tenderness, guarding or rebound. Negative signs include Luna's sign and McBurney's sign.   Musculoskeletal:         General: No swelling or deformity.      Cervical back: Full passive range of motion without pain.      Right lower leg: No edema.      Left lower leg: No edema.   Lymphadenopathy:      Cervical: No cervical adenopathy.   Skin:     Capillary Refill: Capillary refill takes less than 2 seconds.      Coloration: Skin is not jaundiced.      Findings: No rash.   Neurological:      General: No focal deficit present.      Mental Status: She is alert and oriented to person, place, and time. Mental status is at baseline.      Gait: Gait is intact.   Psychiatric:         Mood and Affect: Mood normal.         Behavior: Behavior is cooperative.         Labs Reviewed   CBC WITH AUTO DIFFERENTIAL - Abnormal       Result Value    WBC 4.3 (*)     nRBC 0.0      RBC 2.06 (*)     Hemoglobin 6.7 (*)     Hematocrit 20.5 (*)           MCH 32.5      MCHC 32.7      RDW 21.3 (*)     Platelets 206      Neutrophils % 72.8      Immature Granulocytes %, Automated 0.2      Lymphocytes % 15.0      Monocytes % 9.2      Eosinophils % 2.1      Basophils % 0.7      Neutrophils Absolute 3.15       Immature Granulocytes Absolute, Automated 0.01      Lymphocytes Absolute 0.65 (*)     Monocytes Absolute 0.40      Eosinophils Absolute 0.09      Basophils Absolute 0.03     COMPREHENSIVE METABOLIC PANEL - Abnormal    Glucose 179 (*)     Sodium 137      Potassium 4.4      Chloride 99      Bicarbonate 29      Anion Gap 13      Urea Nitrogen 30 (*)     Creatinine 2.42 (*)     eGFR 20 (*)     Calcium 9.4      Albumin 4.0      Alkaline Phosphatase 87      Total Protein 6.4      AST 15      Bilirubin, Total 1.0      ALT 16     BLOOD GAS VENOUS FULL PANEL - Abnormal    POCT pH, Venous 7.42      POCT pCO2, Venous 48      POCT pO2, Venous 58 (*)     POCT SO2, Venous 90 (*)     POCT Oxy Hemoglobin, Venous 86.7 (*)     POCT Hematocrit Calculated, Venous 20.0 (*)     POCT Sodium, Venous 136      POCT Potassium, Venous 4.4      POCT Chloride, Venous 103      POCT Ionized Calicum, Venous 1.30      POCT Glucose, Venous 187 (*)     POCT Lactate, Venous 1.0      POCT Base Excess, Venous 6.0 (*)     POCT HCO3 Calculated, Venous 31.1 (*)     POCT Hemoglobin, Venous 6.5 (*)     POCT Anion Gap, Venous 6.0 (*)     Patient Temperature 37.0      FiO2 28     INFLUENZA A AND B PCR - Abnormal    Flu A Result Detected (*)     Flu B Result Not Detected      Narrative:     This assay is an in vitro diagnostic multiplex nucleic acid amplification test for the detection and discrimination of Influenza A & B from nasopharyngeal specimens, and has been validated for use at [facility]. Negative results do not preclude Influenza A/B infections, and should not be used as the sole basis for diagnosis, treatment, or other management decisions. If Influenza A/B and RSV PCR results are negative, testing for Parainfluenza virus, Adenovirus and Metapneumovirus is routinely performed for [facility] pediatric oncology and intensive care inpatients, and is available on other patients by placing an add-on request.   SERIAL TROPONIN-INITIAL -  Abnormal    Troponin I, High Sensitivity 17 (*)     Narrative:     Less than 99th percentile of normal range cutoff-  Female and children under 18 years old <14 ng/L; Male <21 ng/L: Negative  Repeat testing should be performed if clinically indicated.     Female and children under 18 years old 14-50 ng/L; Male 21-50 ng/L:  Consistent with possible cardiac damage and possible increased clinical   risk. Serial measurements may help to assess extent of myocardial damage.     >50 ng/L: Consistent with cardiac damage, increased clinical risk and  myocardial infarction. Serial measurements may help assess extent of   myocardial damage.      NOTE: Children less than 1 year old may have higher baseline troponin   levels and results should be interpreted in conjunction with the overall   clinical context.     NOTE: Troponin I testing is performed using a different   testing methodology at [facility] than at other   [facility]. Direct result comparisons should only   be made within the same method.   SERIAL TROPONIN, 1 HOUR - Abnormal    Troponin I, High Sensitivity 17 (*)     Narrative:     Less than 99th percentile of normal range cutoff-  Female and children under 18 years old <14 ng/L; Male <21 ng/L: Negative  Repeat testing should be performed if clinically indicated.     Female and children under 18 years old 14-50 ng/L; Male 21-50 ng/L:  Consistent with possible cardiac damage and possible increased clinical   risk. Serial measurements may help to assess extent of myocardial damage.     >50 ng/L: Consistent with cardiac damage, increased clinical risk and  myocardial infarction. Serial measurements may help assess extent of   myocardial damage.      NOTE: Children less than 1 year old may have higher baseline troponin   levels and results should be interpreted in conjunction with the overall   clinical context.     NOTE: Troponin I testing is performed using a different   testing methodology at  [facility] than at [facility]. Direct result comparisons should only   be made within the same method.   LACTATE - Normal    Lactate 1.1      Narrative:     Venipuncture immediately after or during the administration of Metamizole may lead to falsely low results. Testing should be performed immediately prior to Metamizole dosing.   SARS-COV-2 PCR - Normal    Coronavirus 2019, PCR Not Detected      Narrative:     This assay is an FDA-cleared, in vitro diagnostic nucleic acid amplification test for the qualitative detection and differentiation of SARS CoV-2 from nasopharyngeal specimens collected from individuals with signs and symptoms of respiratory tract infections, and has been validated for use at [facility]. Negative results do not preclude COVID-19 infections and should not be used as the sole basis for diagnosis, treatment, or other management decisions. Testing for SARS CoV-2 is recommended only for patients who meet current clinical and/or epidemiological criteria defined by federal, state, or local public health directives.   RSV PCR - Normal    RSV PCR Not Detected      Narrative:     This assay is an FDA-cleared, in vitro diagnostic nucleic acid amplification test for the detection of RSV from nasopharyngeal specimens, and has been validated for use at [facility]. Negative results do not preclude RSV infections, and should not be used as the sole basis for diagnosis, treatment, or other management decisions. If Influenza A/B and RSV PCR results are negative, testing for Parainfluenza virus, Adenovirus and Metapneumovirus is routinely performed for pediatric oncology and intensive care inpatients at [facility], and is available on other patients by placing an add-on request.       TROPONIN SERIES- (INITIAL, 1 HR)    Narrative:     The following orders were created for panel order Troponin I Series, High Sensitivity (0, 1 HR).  Procedure                                Abnormality         Status                     ---------                               -----------         ------                     Troponin I, High Sensiti...[[id number]]  Abnormal            Final result               Troponin, High Sensitivi...[[id number]]  Abnormal            Final result                 Please view results for these tests on the individual orders.   URINALYSIS WITH REFLEX CULTURE AND MICROSCOPIC    Narrative:     The following orders were created for panel order Urinalysis with Reflex Culture and Microscopic.  Procedure                               Abnormality         Status                     ---------                               -----------         ------                     Urinalysis with Reflex C...[[id number]]                                                 Extra Urine Gray Tube[[id number]]                                                         Please view results for these tests on the individual orders.   URINALYSIS WITH REFLEX CULTURE AND MICROSCOPIC   EXTRA URINE GRAY TUBE   FERRITIN   MORPHOLOGY    RBC Morphology See Below      Hypochromia Mild      Ovalocytes Few         XR chest 2 views   Final Result   1.  Shallow inspiration with no acute pulmonary finding   2. Unchanged elevation of the right hemidiaphragm   3. Cardiomegaly appears greater than on the prior study                  MACRO:   None        Signed by: Pauline Adame 3/19/2025 9:51 AM   Dictation workstation:   [id number]               ED Course & MDM   Diagnoses as of 03/19/25 1151   Acute hypoxic respiratory failure (Multi)   Influenza A   Anemia, unspecified type   Elevated troponin           Medical Decision Making  EKG interpreted by Dr. Roper. Indication: weakness. Findings: NSR with a ventricular rate of 88, normal axis, normal intervals, and no acute ischemic or injury pattern. Impression: No acute pathology.      Patient was seen and evaluated by Dr. Roper. Placed on a  cardiac monitor which showed normal sinus rhythm without ectopy throughout ED stay. Rhythm strip obtained showed normal sinus rhythm. Impression: No acute pathology. Continuous pulse oximetry monitoring showed no signs of hypoxia while on O2 at 2LNC as she was 87% on RA. Given Albuterol x 2 and Atrovent x 1 aerosols with improvement in her shortness of breath. Repeat lung sounds remained unchanged. Saline lock was established with labs drawn and results as above. Noted to have a low WBC count consistent with viral illness. Anemic with H & H of 6.7/20.5. Renal failure is stable. Venous blood gas shows pH of 7.42, PCO2 of 48, and HCO3 of 31.3. Remainder of blood counts, electrolytes, liver function, and lactate were unremarkable. Heart Score- 5 with normal EKG and slightly elevated troponin of 17 with delta troponin of 17. At this time, we feel that the weakness is atypical for acute coronary syndrome, but likely secondary to demand ischemia. We find no underlying evidence of an acute infectious process or pneumothorax on CXR. Clinically, we do not feel they are exhibiting signs of pulmonary embolism or thoracic aortic dissection (no connective tissue disorder, no tachycardia, and mediastinum normal in size on CXR). COVID and RSV were negative. Tested positive for Influenza A. With the new oxygen requirement, we feel that she needs further inpatient evaluation and care. Case was discussed with SUKH Armendariz CNP, [facility], who agrees to admit the patient for further evaluation and care. We are going to hold off on a blood transfusion at this point secondary to having renal failure. He is placing a consult for Dr. Medina to evaluate for such.Will be transferred to the medical floor in stable condition.     Diagnostic Impression:    1. Acute hypoxic respiratory failure    2. Acute Influenza A    3. Acute anemia    4. Elevated troponin    5. CRF on hemodialysis    6. Critical care time 35 minutes           Your medication list         ASK your doctor about these medications        Instructions Last Dose Given Next Dose Due   allopurinol 100 mg tablet  Commonly known as: Zyloprim           amLODIPine 10 mg tablet  Commonly known as: Norvasc           atorvastatin 10 mg tablet  Commonly known as: Lipitor      TAKE ONE TABLET BY MOUTH ONCE DAILY       carvedilol 25 mg tablet  Commonly known as: Coreg      Take 1 tablet (25 mg) by mouth 2 times a day.       cholecalciferol 50 MCG (2000 UT) tablet  Commonly known as: Vitamin D-3           cyanocobalamin 1,000 mcg tablet  Commonly known as: Vitamin B-12           fluticasone 50 mcg/actuation nasal spray  Commonly known as: Flonase           losartan 50 mg tablet  Commonly known as: Cozaar      TAKE ONE TABLET BY MOUTH ONCE DAILY       pioglitazone 15 mg tablet  Commonly known as: Actos      TAKE ONE TABLET BY MOUTH ONCE DAILY       pregabalin 100 mg capsule  Commonly known as: Lyrica      Take 1 capsule (100 mg) by mouth 2 times a day.       sevelamer carbonate 800 mg tablet  Commonly known as: Renvela                      Procedure  Critical Care    Performed by: GAB De Leon-CNP  Authorized by: Silvia Roper MD    Critical care provider statement:     Critical care time (minutes):  35    Critical care time was exclusive of:  Separately billable procedures and treating other patients    Critical care was necessary to treat or prevent imminent or life-threatening deterioration of the following conditions:  Respiratory failure (anemia)    Critical care was time spent personally by me on the following activities:  Blood draw for specimens, development of treatment plan with patient or surrogate, discussions with consultants, evaluation of patient's response to treatment, examination of patient, obtaining history from patient or surrogate, review of old charts, re-evaluation of patient's condition, pulse oximetry, ordering and review of radiographic studies, ordering and review of  laboratory studies and ordering and performing treatments and interventions       Arvin Gutierrez, APRN-CNP  03/19/25 3451

## 2025-03-19 NOTE — CONSULTS
Nephrology Consult Note                                                                                                                                         Inpatient consult to Nephrology  Consult performed by: Pinky Christie DO  Consult ordered by: JoseM iguel Armendariz, GAB-NICOLÁS                                                                                                               HPI  Patient is a 79 y.o. female history of ESRD on HD Monday Wednesday Friday [facility], mild dysplastic syndrome, DM 2, HTN who is admitted to hospital with complaints of   weakness and shortness of breath, fall. Nephrology consulted in view of ESRD.     Patient found to be positive for influenza.  She is feeling weak.  She seen in the ER currently getting nebulizer treatment.  She is due for dialysis today.  She still makes urine.  She states her appetite has been doing pretty well.  She started feeling bad yesterday but 2 weeks ago she was also having cough    Past Medical History:   Diagnosis Date    Abnormal levels of other serum enzymes     Elevated liver enzymes    Acute kidney failure     Anemia     CKD (chronic kidney disease)     COPD (chronic obstructive pulmonary disease) (Multi)     Coronary artery disease     Disease of blood and blood-forming organs, unspecified     Bone marrow disorder    HLD (hyperlipidemia)     Hypertension     MDS (myelodysplastic syndrome) (Multi)     Personal history of other diseases of the musculoskeletal system and connective tissue     History of muscle pain    Personal history of other specified conditions     History of insomnia    Personal history of other specified conditions     History of edema    Type 2 diabetes mellitus     Urinary tract infection, site not specified 10/17/2024      Social History     Socioeconomic History    Marital status:       Spouse name: Not on file    Number of children: Not on file    Years of education: Not on file    Highest education level: Not on file   Occupational History    Not on file   Tobacco Use    Smoking status: Never     Passive exposure: Never    Smokeless tobacco: Never   Vaping Use    Vaping status: Never Used   Substance and Sexual Activity    Alcohol use: Not Currently    Drug use: Never    Sexual activity: Not Currently   Other Topics Concern    Not on file   Social History Narrative    Not on file     Social Drivers of Health     Financial Resource Strain: Low Risk  (10/10/2024)    Overall Financial Resource Strain (CARDIA)     Difficulty of Paying Living Expenses: Not hard at all   Food Insecurity: No Food Insecurity (11/7/2024)    Hunger Vital Sign     Worried About Running Out of Food in the Last Year: Never true     Ran Out of Food in the Last Year: Never true   Transportation Needs: No Transportation Needs (10/17/2024)    OASIS : Transportation     Lack of Transportation (Medical): No     Lack of Transportation (Non-Medical): No     Patient Unable or Declines to Respond: No   Physical Activity: Inactive (10/9/2024)    Exercise Vital Sign     Days of Exercise per Week: 0 days     Minutes of Exercise per Session: 0 min   Stress: No Stress Concern Present (10/9/2024)    [employer] [place] [employer] - Occupational Stress Questionnaire     Feeling of Stress : Only a little   Social Connections: Feeling Socially Integrated (10/17/2024)    OASIS : Social Isolation     Frequency of experiencing loneliness or isolation: Never   Intimate Partner Violence: Not At Risk (10/9/2024)    Humiliation, Afraid, Rape, and Kick questionnaire     Fear of Current or Ex-Partner: No     Emotionally Abused: No     Physically Abused: No     Sexually Abused: No   Housing Stability: Low Risk  (10/10/2024)    Housing Stability Vital Sign     Unable to Pay for Housing in the Last Year: No     Number of Times Moved  in the Last Year: 0     Homeless in the Last Year: No      Family History   Problem Relation Name Age of Onset    Heart disease Mother      Heart attack Father      Hodgkin's lymphoma Son        No current facility-administered medications on file prior to encounter.     Current Outpatient Medications on File Prior to Encounter   Medication Sig Dispense Refill    allopurinol (Zyloprim) 100 mg tablet Take 1 tablet (100 mg) by mouth every 12 hours.      amLODIPine (Norvasc) 10 mg tablet Take 1 tablet (10 mg) by mouth once daily.      atorvastatin (Lipitor) 10 mg tablet TAKE ONE TABLET BY MOUTH ONCE DAILY 30 tablet 0    carvedilol (Coreg) 25 mg tablet Take 1 tablet (25 mg) by mouth 2 times a day. 60 tablet 3    cholecalciferol (Vitamin D-3) 50 MCG (2000 UT) tablet Take 1 tablet (2,000 Units) by mouth once daily.      cyanocobalamin (Vitamin B-12) 1,000 mcg tablet Take 1 tablet (1,000 mcg) by mouth once daily.      fluticasone (Flonase) 50 mcg/actuation nasal spray Administer 1 spray into each nostril once daily.      losartan (Cozaar) 50 mg tablet TAKE ONE TABLET BY MOUTH ONCE DAILY 30 tablet 0    pioglitazone (Actos) 15 mg tablet TAKE ONE TABLET BY MOUTH ONCE DAILY 30 tablet 0    pregabalin (Lyrica) 100 mg capsule Take 1 capsule (100 mg) by mouth 2 times a day. 60 capsule 0    sevelamer carbonate (Renvela) 800 mg tablet Take by mouth.      [DISCONTINUED] atorvastatin (Lipitor) 10 mg tablet Take 1 tablet (10 mg) by mouth once daily. 30 tablet 3    [DISCONTINUED] losartan (Cozaar) 50 mg tablet Take 1 tablet (50 mg) by mouth once daily. 30 tablet 3    [DISCONTINUED] pioglitazone (Actos) 15 mg tablet Take 1 tablet (15 mg) by mouth once daily. 30 tablet 1      Scheduled medications  allopurinol, 100 mg, oral, BID  [START ON 3/20/2025] amLODIPine, 10 mg, oral, Daily  atorvastatin, 10 mg, oral, Nightly  carvedilol, 25 mg, oral, BID  cholecalciferol, 2,000 Units, oral, Daily  cyanocobalamin, 1,000 mcg, oral,  "Daily  fluticasone, 1 spray, Each Nostril, Daily  guaiFENesin, 1,200 mg, oral, BID  heparin (porcine), 5,000 Units, subcutaneous, q8h  insulin lispro, 0-10 Units, subcutaneous, TID AC  ipratropium-albuteroL, 3 mL, nebulization, q6h  [START ON 3/20/2025] losartan, 50 mg, oral, Daily  [START ON 3/21/2025] oseltamivir, 30 mg, oral, Once per day on Monday Wednesday Friday  [START ON 3/20/2025] pantoprazole, 40 mg, oral, Daily before breakfast   Or  [START ON 3/20/2025] pantoprazole, 40 mg, intravenous, Daily before breakfast  pioglitazone, 15 mg, oral, Daily  polyethylene glycol, 17 g, oral, Daily  pregabalin, 100 mg, oral, BID      Continuous medications     PRN medications  PRN medications: acetaminophen, benzocaine-menthol, bisacodyl, dextrose, dextrose, glucagon, glucagon, melatonin, ondansetron ODT **OR** ondansetron     Review of systems  as per HPI otherwise 10 point review systems negative    /73 (BP Location: Right arm, Patient Position: Sitting)   Pulse 98   Temp 37.5 °C (99.5 °F) (Tympanic)   Resp (!) 22   Ht 1.549 m (5' 1\")   Wt 70 kg (154 lb 5.2 oz)   SpO2 97%   BMI 29.16 kg/m²     Input / Output:  24 HR: No intake or output data in the 24 hours ending 03/19/25 1311    Physical Exam   Alert and oriented x 3, edema around eyes  OP clear  Neck: supple, No JVD  Right IJ TDC  CV: RRR without m/r/g  Lungs: CTA bilaterally  Abd: soft NT/ND +BS  Ext: no lower extremity edema   Neuro: grossly intact  Skin: no rashes    Results from last 7 days   Lab Units 03/19/25  0833   SODIUM mmol/L 137   POTASSIUM mmol/L 4.4   CHLORIDE mmol/L 99   CO2 mmol/L 29   BUN mg/dL 30*   CREATININE mg/dL 2.42*   GLUCOSE mg/dL 179*   CALCIUM mg/dL 9.4        Results from last 7 days   Lab Units 03/19/25  0833   SODIUM mmol/L 137   POTASSIUM mmol/L 4.4   CHLORIDE mmol/L 99   CO2 mmol/L 29   BUN mg/dL 30*   CREATININE mg/dL 2.42*   CALCIUM mg/dL 9.4   PROTEIN TOTAL g/dL 6.4   BILIRUBIN TOTAL mg/dL 1.0   ALK PHOS U/L 87   ALT " U/L 16   AST U/L 15   GLUCOSE mg/dL 179*           Results from last 7 days   Lab Units 03/19/25  0833 03/18/25  1151   WBC AUTO x10*3/uL 4.3* 3.8*   HEMOGLOBIN g/dL 6.7* 6.3*   HEMATOCRIT % 20.5* 20.1*   PLATELETS AUTO x10*3/uL 206 201        XR chest 2 views   Final Result   1.  Shallow inspiration with no acute pulmonary finding   2. Unchanged elevation of the right hemidiaphragm   3. Cardiomegaly appears greater than on the prior study                  MACRO:   None        Signed by: Pauline Adame 3/19/2025 9:51 AM   Dictation workstation:   [id number]           Assessment:   Patient is 79 y.o. female history of DM 2, HTN, ESRD, MDS who is admitted to hospital for influenza, fall. Nephrology consulted in view of ESRD.      ESRD  -HD MWF [facility]  -Access is right IJ TDC    Anemia of ESRD and MDS  -On high-dose MARILYNN with dialysis, will continue here  -As needed blood transfusions    CKD-MBD  -Not currently on binder, VDRL or calcium emetic  HTN    Influenza      Recommendations:   -HD later today, UF 1 to 2 L as blood pressure tolerates  -Continue Epogen 28,000 units Monday Wednesday Friday      Please message me through [employer] chat with any questions or concerns.     Pinky Christie DO  3/19/2025  1:11 PM         [facility]    [address], [address]   [place] [address]  Office: [phone]

## 2025-03-20 ENCOUNTER — APPOINTMENT (OUTPATIENT)
Dept: INFUSION THERAPY | Facility: HOSPITAL | Age: 80
End: 2025-03-20
Payer: MEDICARE

## 2025-03-20 LAB
ABO GROUP (TYPE) IN BLOOD: NORMAL
ANION GAP SERPL CALC-SCNC: 10 MMOL/L (ref 10–20)
ANTIBODY SCREEN: NORMAL
BLOOD EXPIRATION DATE: NORMAL
BLOOD EXPIRATION DATE: NORMAL
BUN SERPL-MCNC: 14 MG/DL (ref 6–23)
CALCIUM SERPL-MCNC: 8.4 MG/DL (ref 8.6–10.3)
CHLORIDE SERPL-SCNC: 101 MMOL/L (ref 98–107)
CO2 SERPL-SCNC: 29 MMOL/L (ref 21–32)
CREAT SERPL-MCNC: 1.26 MG/DL (ref 0.5–1.05)
DISPENSE STATUS: NORMAL
DISPENSE STATUS: NORMAL
EGFRCR SERPLBLD CKD-EPI 2021: 43 ML/MIN/1.73M*2
ERYTHROCYTE [DISTWIDTH] IN BLOOD BY AUTOMATED COUNT: 21.5 % (ref 11.5–14.5)
GLUCOSE BLD MANUAL STRIP-MCNC: 127 MG/DL (ref 74–99)
GLUCOSE BLD MANUAL STRIP-MCNC: 134 MG/DL (ref 74–99)
GLUCOSE BLD MANUAL STRIP-MCNC: 135 MG/DL (ref 74–99)
GLUCOSE BLD MANUAL STRIP-MCNC: 150 MG/DL (ref 74–99)
GLUCOSE SERPL-MCNC: 172 MG/DL (ref 74–99)
HCT VFR BLD AUTO: 16.3 % (ref 36–46)
HGB BLD-MCNC: 5.4 G/DL (ref 12–16)
MCH RBC QN AUTO: 33.5 PG (ref 26–34)
MCHC RBC AUTO-ENTMCNC: 33.1 G/DL (ref 32–36)
MCV RBC AUTO: 101 FL (ref 80–100)
NRBC BLD-RTO: 0 /100 WBCS (ref 0–0)
PLATELET # BLD AUTO: 156 X10*3/UL (ref 150–450)
POTASSIUM SERPL-SCNC: 3.7 MMOL/L (ref 3.5–5.3)
PRODUCT BLOOD TYPE: 600
PRODUCT BLOOD TYPE: 600
PRODUCT CODE: NORMAL
PRODUCT CODE: NORMAL
RBC # BLD AUTO: 1.61 X10*6/UL (ref 4–5.2)
RH FACTOR (ANTIGEN D): NORMAL
SODIUM SERPL-SCNC: 136 MMOL/L (ref 136–145)
UNIT ABO: NORMAL
UNIT ABO: NORMAL
UNIT NUMBER: NORMAL
UNIT NUMBER: NORMAL
UNIT RH: NORMAL
UNIT RH: NORMAL
UNIT VOLUME: 285
UNIT VOLUME: 350
WBC # BLD AUTO: 4.2 X10*3/UL (ref 4.4–11.3)
XM INTEP: NORMAL
XM INTEP: NORMAL

## 2025-03-20 PROCEDURE — 2500000001 HC RX 250 WO HCPCS SELF ADMINISTERED DRUGS (ALT 637 FOR MEDICARE OP): Performed by: NURSE PRACTITIONER

## 2025-03-20 PROCEDURE — 99232 SBSQ HOSP IP/OBS MODERATE 35: CPT | Performed by: NURSE PRACTITIONER

## 2025-03-20 PROCEDURE — P9040 RBC LEUKOREDUCED IRRADIATED: HCPCS

## 2025-03-20 PROCEDURE — 36430 TRANSFUSION BLD/BLD COMPNT: CPT

## 2025-03-20 PROCEDURE — 2500000002 HC RX 250 W HCPCS SELF ADMINISTERED DRUGS (ALT 637 FOR MEDICARE OP, ALT 636 FOR OP/ED): Performed by: NURSE PRACTITIONER

## 2025-03-20 PROCEDURE — 86922 COMPATIBILITY TEST ANTIGLOB: CPT

## 2025-03-20 PROCEDURE — 36415 COLL VENOUS BLD VENIPUNCTURE: CPT | Performed by: STUDENT IN AN ORGANIZED HEALTH CARE EDUCATION/TRAINING PROGRAM

## 2025-03-20 PROCEDURE — 2500000004 HC RX 250 GENERAL PHARMACY W/ HCPCS (ALT 636 FOR OP/ED): Performed by: NURSE PRACTITIONER

## 2025-03-20 PROCEDURE — 86902 BLOOD TYPE ANTIGEN DONOR EA: CPT

## 2025-03-20 PROCEDURE — 85027 COMPLETE CBC AUTOMATED: CPT | Performed by: STUDENT IN AN ORGANIZED HEALTH CARE EDUCATION/TRAINING PROGRAM

## 2025-03-20 PROCEDURE — 87075 CULTR BACTERIA EXCEPT BLOOD: CPT | Mod: PORLAB | Performed by: STUDENT IN AN ORGANIZED HEALTH CARE EDUCATION/TRAINING PROGRAM

## 2025-03-20 PROCEDURE — 2500000005 HC RX 250 GENERAL PHARMACY W/O HCPCS: Performed by: STUDENT IN AN ORGANIZED HEALTH CARE EDUCATION/TRAINING PROGRAM

## 2025-03-20 PROCEDURE — 94640 AIRWAY INHALATION TREATMENT: CPT

## 2025-03-20 PROCEDURE — 99221 1ST HOSP IP/OBS SF/LOW 40: CPT | Performed by: INTERNAL MEDICINE

## 2025-03-20 PROCEDURE — 80048 BASIC METABOLIC PNL TOTAL CA: CPT | Performed by: STUDENT IN AN ORGANIZED HEALTH CARE EDUCATION/TRAINING PROGRAM

## 2025-03-20 PROCEDURE — 82947 ASSAY GLUCOSE BLOOD QUANT: CPT

## 2025-03-20 PROCEDURE — 86850 RBC ANTIBODY SCREEN: CPT | Performed by: STUDENT IN AN ORGANIZED HEALTH CARE EDUCATION/TRAINING PROGRAM

## 2025-03-20 PROCEDURE — 87040 BLOOD CULTURE FOR BACTERIA: CPT | Mod: PORLAB | Performed by: STUDENT IN AN ORGANIZED HEALTH CARE EDUCATION/TRAINING PROGRAM

## 2025-03-20 PROCEDURE — 1200000002 HC GENERAL ROOM WITH TELEMETRY DAILY

## 2025-03-20 PROCEDURE — 86900 BLOOD TYPING SEROLOGIC ABO: CPT | Performed by: STUDENT IN AN ORGANIZED HEALTH CARE EDUCATION/TRAINING PROGRAM

## 2025-03-20 PROCEDURE — 2500000002 HC RX 250 W HCPCS SELF ADMINISTERED DRUGS (ALT 637 FOR MEDICARE OP, ALT 636 FOR OP/ED): Performed by: INTERNAL MEDICINE

## 2025-03-20 RX ADMIN — ACETAMINOPHEN 650 MG: 325 TABLET ORAL at 21:29

## 2025-03-20 RX ADMIN — PREGABALIN 75 MG: 75 CAPSULE ORAL at 08:24

## 2025-03-20 RX ADMIN — GUAIFENESIN 1200 MG: 600 TABLET ORAL at 08:24

## 2025-03-20 RX ADMIN — CARVEDILOL 25 MG: 25 TABLET, FILM COATED ORAL at 21:29

## 2025-03-20 RX ADMIN — PIOGLITAZONE HYDROCHLORIDE 15 MG: 30 TABLET ORAL at 08:24

## 2025-03-20 RX ADMIN — Medication 3 L/MIN: at 20:31

## 2025-03-20 RX ADMIN — AMLODIPINE BESYLATE 10 MG: 10 TABLET ORAL at 08:24

## 2025-03-20 RX ADMIN — ATORVASTATIN CALCIUM 10 MG: 10 TABLET, FILM COATED ORAL at 21:29

## 2025-03-20 RX ADMIN — GUAIFENESIN 1200 MG: 600 TABLET ORAL at 21:29

## 2025-03-20 RX ADMIN — IPRATROPIUM BROMIDE AND ALBUTEROL SULFATE 3 ML: 2.5; .5 SOLUTION RESPIRATORY (INHALATION) at 08:01

## 2025-03-20 RX ADMIN — ALLOPURINOL 100 MG: 100 TABLET ORAL at 08:24

## 2025-03-20 RX ADMIN — FLUTICASONE PROPIONATE 1 SPRAY: 50 SPRAY, METERED NASAL at 08:23

## 2025-03-20 RX ADMIN — CARVEDILOL 25 MG: 25 TABLET, FILM COATED ORAL at 08:24

## 2025-03-20 RX ADMIN — IPRATROPIUM BROMIDE AND ALBUTEROL SULFATE 3 ML: 2.5; .5 SOLUTION RESPIRATORY (INHALATION) at 11:15

## 2025-03-20 RX ADMIN — Medication 1000 MCG: at 08:24

## 2025-03-20 RX ADMIN — IPRATROPIUM BROMIDE AND ALBUTEROL SULFATE 3 ML: 2.5; .5 SOLUTION RESPIRATORY (INHALATION) at 20:31

## 2025-03-20 RX ADMIN — Medication 2000 UNITS: at 08:24

## 2025-03-20 RX ADMIN — ALLOPURINOL 100 MG: 100 TABLET ORAL at 21:29

## 2025-03-20 RX ADMIN — LOSARTAN POTASSIUM 50 MG: 50 TABLET, FILM COATED ORAL at 08:24

## 2025-03-20 ASSESSMENT — COGNITIVE AND FUNCTIONAL STATUS - GENERAL
MOVING FROM LYING ON BACK TO SITTING ON SIDE OF FLAT BED WITH BEDRAILS: A LITTLE
DRESSING REGULAR UPPER BODY CLOTHING: A LITTLE
DRESSING REGULAR LOWER BODY CLOTHING: A LITTLE
DAILY ACTIVITIY SCORE: 19
HELP NEEDED FOR BATHING: A LITTLE
STANDING UP FROM CHAIR USING ARMS: A LITTLE
TURNING FROM BACK TO SIDE WHILE IN FLAT BAD: A LITTLE
CLIMB 3 TO 5 STEPS WITH RAILING: A LOT
WALKING IN HOSPITAL ROOM: A LITTLE
MOBILITY SCORE: 17
MOVING TO AND FROM BED TO CHAIR: A LITTLE
PERSONAL GROOMING: A LITTLE
TOILETING: A LITTLE

## 2025-03-20 ASSESSMENT — ENCOUNTER SYMPTOMS
COUGH: 1
WEAKNESS: 1
FEVER: 1
FATIGUE: 1
WHEEZING: 1
CHILLS: 1

## 2025-03-20 ASSESSMENT — PAIN SCALES - GENERAL: PAINLEVEL_OUTOF10: 0 - NO PAIN

## 2025-03-20 ASSESSMENT — PAIN - FUNCTIONAL ASSESSMENT: PAIN_FUNCTIONAL_ASSESSMENT: 0-10

## 2025-03-20 NOTE — PROGRESS NOTES
"      Nephrology Progress Note      Nephrology following for ESRD.   Events over night: none    Seen in room. Very fatigued. Getting PRBC's now. Denies worsening SOB. No nausea or vomiting. Appetite is poor.     /60 (BP Location: Right arm, Patient Position: Lying)   Pulse 83   Temp 37.3 °C (99.2 °F) (Temporal)   Resp 16   Ht 1.549 m (5' 1\")   Wt 70 kg (154 lb 5.2 oz)   SpO2 96%   BMI 29.16 kg/m²     Input / Output:  24 HR:   Intake/Output Summary (Last 24 hours) at 3/20/2025 1052  Last data filed at 3/20/2025 0900  Gross per 24 hour   Intake 2190 ml   Output 250 ml   Net 1940 ml       Physical Exam   Alert and oriented x 3 NAD  Neck: no JVD  CV: RRR  Lungs: CTA bilaterally  Abd: soft, NT, ND   Ext: no lower extremity edema    Scheduled medications  allopurinol, 100 mg, oral, BID  amLODIPine, 10 mg, oral, Daily  atorvastatin, 10 mg, oral, Nightly  carvedilol, 25 mg, oral, BID  cholecalciferol, 2,000 Units, oral, Daily  cyanocobalamin, 1,000 mcg, oral, Daily  fluticasone, 1 spray, Each Nostril, Daily  guaiFENesin, 1,200 mg, oral, BID  insulin lispro, 0-10 Units, subcutaneous, TID AC  ipratropium-albuteroL, 3 mL, nebulization, TID  losartan, 50 mg, oral, Daily  [START ON 3/21/2025] oseltamivir, 30 mg, oral, Once per day on Monday Wednesday Friday  pantoprazole, 40 mg, oral, Daily before breakfast   Or  pantoprazole, 40 mg, intravenous, Daily before breakfast  pioglitazone, 15 mg, oral, Daily  polyethylene glycol, 17 g, oral, Daily  pregabalin, 75 mg, oral, Daily  sevelamer carbonate, 800 mg, oral, TID      Continuous medications     PRN medications  PRN medications: acetaminophen, benzocaine-menthol, bisacodyl, dextrose, dextrose, glucagon, glucagon, ipratropium-albuteroL, melatonin, ondansetron ODT **OR** ondansetron   Results from last 7 days   Lab Units 03/20/25  0320 03/19/25  0833   SODIUM mmol/L 136 137   POTASSIUM mmol/L 3.7 4.4   CHLORIDE mmol/L 101 99   CO2 mmol/L 29 29   BUN mg/dL 14 30* "   CREATININE mg/dL 1.26* 2.42*   CALCIUM mg/dL 8.4* 9.4   PROTEIN TOTAL g/dL  --  6.4   BILIRUBIN TOTAL mg/dL  --  1.0   ALK PHOS U/L  --  87   ALT U/L  --  16   AST U/L  --  15   GLUCOSE mg/dL 172* 179*           Results from last 7 days   Lab Units 03/20/25  0320 03/19/25  0833 03/18/25  1151   WBC AUTO x10*3/uL 4.2* 4.3* 3.8*   HEMOGLOBIN g/dL 5.4* 6.7* 6.3*   HEMATOCRIT % 16.3* 20.5* 20.1*   PLATELETS AUTO x10*3/uL 156 206 201        Assessment & Plan:      Patient is 79 y.o. female history of DM 2, HTN, ESRD, MDS who is admitted to hospital for influenza, fall. Nephrology consulted in view of ESRD.       ESRD  -HD MWF [facility]  -Access is right IJ TDC     Anemia of ESRD and MDS  -On high-dose MARILYNN with dialysis, will continue here  -PRBC's today. Dr Medina following as well     CKD-MBD  -Not currently on binder, VDRL or calcium emetic  HTN     Influenza        Recommendations:   -HD tomorrow, PRBC's today   -Continue Epogen 28,000 units Monday Wednesday Friday        Please message me through [employer] chat with any questions or concerns.     Josue Ramirez PA-C  3/20/2025  10:52 AM     [facility]    [address], [address]   [place] [address]  Office: [phone]

## 2025-03-20 NOTE — CARE PLAN
The patient's goals for the shift include      The clinical goals for the shift include to remain comfortable    Problem: Pain - Adult  Goal: Verbalizes/displays adequate comfort level or baseline comfort level  Outcome: Progressing     Problem: Safety - Adult  Goal: Free from fall injury  Outcome: Progressing     Problem: Discharge Planning  Goal: Discharge to home or other facility with appropriate resources  Outcome: Progressing     Problem: Chronic Conditions and Co-morbidities  Goal: Patient's chronic conditions and co-morbidity symptoms are monitored and maintained or improved  Outcome: Progressing     Problem: Nutrition  Goal: Nutrient intake appropriate for maintaining nutritional needs  Outcome: Progressing     Problem: Diabetes  Goal: Achieve decreasing blood glucose levels by end of shift  Outcome: Progressing  Goal: Increase stability of blood glucose readings by end of shift  Outcome: Progressing  Goal: Decrease in ketones present in urine by end of shift  Outcome: Progressing  Goal: Maintain electrolyte levels within acceptable range throughout shift  Outcome: Progressing  Goal: Maintain glucose levels >70mg/dl to <250mg/dl throughout shift  Outcome: Progressing  Goal: No changes in neurological exam by end of shift  Outcome: Progressing  Goal: Learn about and adhere to nutrition recommendations by end of shift  Outcome: Progressing  Goal: Vital signs within normal range for age by end of shift  Outcome: Progressing  Goal: Increase self care and/or family involovement by end of shift  Outcome: Progressing  Goal: Receive DSME education by end of shift  Outcome: Progressing

## 2025-03-20 NOTE — CONSULTS
Reason For Consult  Myelodysplastic syndrome    History Of Present Illness  Tana Hargrove is a 79 y.o. female presenting with weakness, shortness of breath low-grade fever chills and shivering.  Past medical history significant for ESRD on hemodialysis, atrial fibrillation, mild myelodysplastic syndrome, congestive cardiac failure, coronary artery disease, diabetes mellitus.  Because of the weakness and above symptoms patient brought to emergency department, in the emergency hemoglobin was 6.7 and she tested positive for influenza.  Patient admitted in the hospital.    Hematology consultation requested because of myelodysplastic syndrome.    Medical record reviewed, nephrology input noted.    Today patient complains of weakness fatigue some shortness of breath not feeling very well.  Patient is receiving RBC transfusion.     Past Medical History  She has a past medical history of Abnormal levels of other serum enzymes, Acute kidney failure, Anemia, CKD (chronic kidney disease), COPD (chronic obstructive pulmonary disease) (Multi), Coronary artery disease, Disease of blood and blood-forming organs, unspecified, HLD (hyperlipidemia), Hypertension, MDS (myelodysplastic syndrome) (Multi), Personal history of other diseases of the musculoskeletal system and connective tissue, Personal history of other specified conditions, Personal history of other specified conditions, Type 2 diabetes mellitus, and Urinary tract infection, site not specified (10/17/2024).    She has no past medical history of Adverse effect of anesthesia, Arthritis, Asthma, Awareness under anesthesia, CHF (congestive heart failure), Delayed emergence from general anesthesia, Disease of thyroid gland, Hard to intubate, History of transfusion, Malignant hyperthermia, PONV (postoperative nausea and vomiting), Pseudocholinesterase deficiency, Sickle cell anemia (Multi), Spinal headache, or Stroke (Multi).    Surgical History  She has a past surgical  "history that includes Other surgical history (12/13/2019); Other surgical history (12/13/2019); Other surgical history (Bilateral, 12/13/2019); Other surgical history (Left, 12/13/2019); Other surgical history (12/13/2019); Other surgical history (12/13/2019); Other surgical history (Right, 12/13/2019); Other surgical history (04/16/2021); MR angio head wo IV contrast (11/20/2020); MR angio neck wo IV contrast (11/20/2020); Breast biopsy (Left); Hysterectomy; Appendectomy; Colonoscopy; Oophorectomy; Joint replacement; and Cholecystectomy (06/06/2024).     Social History  She reports that she has never smoked. She has never been exposed to tobacco smoke. She has never used smokeless tobacco. She reports that she does not currently use alcohol. She reports that she does not use drugs.    Family History  Family History   Problem Relation Name Age of Onset    Heart disease Mother      Heart attack Father      Hodgkin's lymphoma Son          Allergies  Codeine, Hydrocodone, Hydrocodone-acetaminophen, Tramadol, Piperacillin-tazobactam, Adhesive tape-silicones, and Meperidine    Review of Systems  Review of system positive for weakness, fatigue, fever, shortness of breath, not very active     Physical Exam  Afebrile today has some shortness of breath    Lung examination did show decreased breath sounds on both side    Heart with normal regular rhythm    Abdomen is soft, nontender no hepatosplenomegaly  Extremity trace edema     Last Recorded Vitals  Blood pressure 147/68, pulse 82, temperature 36.7 °C (98 °F), temperature source Temporal, resp. rate 20, height 1.549 m (5' 1\"), weight 70 kg (154 lb 5.2 oz), SpO2 97%.    Relevant Results   Latest Reference Range & Units 03/19/25 08:33 03/19/25 09:32   GLUCOSE 74 - 99 mg/dL 179 (H)    SODIUM 136 - 145 mmol/L 137    POTASSIUM 3.5 - 5.3 mmol/L 4.4    CHLORIDE 98 - 107 mmol/L 99    Bicarbonate 21 - 32 mmol/L 29    Anion Gap 10 - 20 mmol/L 13    Blood Urea Nitrogen 6 - 23 mg/dL " 30 (H)    Creatinine 0.50 - 1.05 mg/dL 2.42 (H)    EGFR >60 mL/min/1.73m*2 20 (L)    Calcium 8.6 - 10.3 mg/dL 9.4    Albumin 3.4 - 5.0 g/dL 4.0    Alkaline Phosphatase 33 - 136 U/L 87    ALT 7 - 45 U/L 16    AST 9 - 39 U/L 15    Bilirubin Total 0.0 - 1.2 mg/dL 1.0    FERRITIN 8 - 150 ng/mL  3,132 (H)   (   Latest Reference Range & Units 03/20/25 03:20   WBC 4.4 - 11.3 x10*3/uL 4.2 (L)   nRBC 0.0 - 0.0 /100 WBCs 0.0   RBC 4.00 - 5.20 x10*6/uL 1.61 (L)   HEMOGLOBIN 12.0 - 16.0 g/dL 5.4 (LL)   HEMATOCRIT 36.0 - 46.0 % 16.3 (L)   MCV 80 - 100 fL 101 (H)   MCH 26.0 - 34.0 pg 33.5   MCHC 32.0 - 36.0 g/dL 33.1   RED CELL DISTRIBUTION WIDTH 11.5 - 14.5 % 21.5 (H)   Platelets 150 - 450 x10*3/uL 156   (LL): Data is critically low  (L): Data is abnormally low  (H): Data is abnormally high     Assessment/Plan   #1 acute hypoxic respiratory failure due to COPD and superimposed influenza A    2.  ESRD on hemodialysis    3.  Myelodysplastic syndrome, agree with RBC transfusion, patient receiving Procrit 80,000 every weekly not responding very well    4.  Coronary artery disease, congestive cardiac failure, atrial fibrillation    Continue supportive care, monitor CBC, RBC transfusion as needed start Procrit injection       I spent 45 minutes in the professional and overall care of this patient.      Azeem Medina MD

## 2025-03-20 NOTE — NURSING NOTE
This RN messaged hospitalist regarding hgb of 6.7 and why we aren't giving her any blood. Also as this RN was reading the notes further it states that she isn't on any anticoags due to her blood dyscrasias and hep subcutaneous was ordered and given. Dr Vo has since discontinued hep subcutaneous. Pt has been very lethargic.

## 2025-03-20 NOTE — PROGRESS NOTES
Physical Therapy                 Therapy Communication Note    Patient Name: Tana Hargrove  MRN: [id number]  Department: [facility] 2 E  Room: [address] [place], [place] [address]  Today's Date: 3/20/2025     Discipline: Physical Therapy    PT Missed Visit: Yes     Missed Visit Reason: Missed Visit Reason: Patient placed on medical hold (PT. W HGB 5.4, NEEDS BLOOD,, PER RN PT. NAUSEATED, NOT STABLE ENOUGH FOR THERAPY)    Missed Time: Attempt    Comment:

## 2025-03-20 NOTE — PROGRESS NOTES
03/20/25 1543   Discharge Planning   Living Arrangements Alone   Support Systems Children;Family members   Assistance Needed none   Type of Residence Private residence   Who is requesting discharge planning? Provider   Home or Post Acute Services Post acute facilities (Rehab/SNF/etc)   Type of Post Acute Facility Services Skilled nursing   Expected Discharge Disposition SNF   Does the patient need discharge transport arranged? Yes   RoundTrip coordination needed? Yes   Has discharge transport been arranged? No   Stroke Family Assessment   Stroke Family Assessment Needed No   Intensity of Service   Intensity of Service 0-30 min     The information presented here is pulled from throughout the chart as I have not been able to get in contact with this pt and SW went to pt room and gave her the phone and put her cell phone close as well and pt still did not answer. SW did say pt was difficult to arouse. Pt also had a low Hgb of 5.4 and required a transfusion. She also missed a day of hemodialysis which she gets on Tuesdays Thursdays and Saturdays according to the notes. I am unsure where or of the time. PCP is Gisela. Pharmacy is [facility]. [facility] 19/17 per nursing. PT/OT eval was put on hold until she is more stable with hgb. She is from home. Discharge TBD. Care Transitions to follow. Macy Salomon BSN/RN-TCC

## 2025-03-20 NOTE — PROGRESS NOTES
Occupational Therapy                 Therapy Communication Note    Patient Name: Tana Hargrove  MRN: [id number]  Department: [facility] 2 E  Room: [address] [place], [place] [address]  Today's Date: 3/20/2025     Discipline: Occupational Therapy    OT Missed Visit: Yes     Missed Visit Reason: Patient placed on medical hold (Pt. with low Hg and nauseaus at this time.  Will eval. another time.)

## 2025-03-20 NOTE — PROGRESS NOTES
Tana Hargrove is a 79 y.o. female admitted for Acute hypoxic respiratory failure (Multi). Pharmacy reviewed the patient's [id number] medications and allergies for accuracy.    The list below reflects the PTA list prior to pharmacy medication history. A summary a changes to the PTA medication list has been listed below. Please review each medication in order reconciliation for additional clarification and justification.    Source of information: [facility] & [facility]     Medications added:    Medications modified:    Medications to be removed:  Flonase nasal spray   Renvela 800mg     Medications of concern:      Prior to Admission Medications   Prescriptions Last Dose Informant Patient Reported? Taking?   allopurinol (Zyloprim) 100 mg tablet   Yes No   Sig: Take 1 tablet (100 mg) by mouth every 12 hours.   amLODIPine (Norvasc) 10 mg tablet   Yes No   Sig: Take 1 tablet (10 mg) by mouth once daily.   atorvastatin (Lipitor) 10 mg tablet   No No   Sig: TAKE ONE TABLET BY MOUTH ONCE DAILY   carvedilol (Coreg) 25 mg tablet   No No   Sig: Take 1 tablet (25 mg) by mouth 2 times a day.   cholecalciferol (Vitamin D-3) 50 MCG (2000 UT) tablet   Yes No   Sig: Take 1 tablet (2,000 Units) by mouth once daily.   cyanocobalamin (Vitamin B-12) 1,000 mcg tablet   Yes No   Sig: Take 1 tablet (1,000 mcg) by mouth once daily.   fluticasone (Flonase) 50 mcg/actuation nasal spray   Yes No   Sig: Administer 1 spray into each nostril once daily.   losartan (Cozaar) 50 mg tablet   No No   Sig: TAKE ONE TABLET BY MOUTH ONCE DAILY   pioglitazone (Actos) 15 mg tablet   No No   Sig: TAKE ONE TABLET BY MOUTH ONCE DAILY   pregabalin (Lyrica) 100 mg capsule   No No   Sig: Take 1 capsule (100 mg) by mouth 2 times a day.   sevelamer carbonate (Renvela) 800 mg tablet   Yes No   Sig: Take by mouth.      Facility-Administered Medications: None       VIVEK FRAZIER

## 2025-03-20 NOTE — POST-PROCEDURE NOTE
Report to Receiving RN:    Report To: Sadia Rn  Time Report Called: [id number]  Hand-Off Communication: VSS, no fluid removed, CVC clamped, capped and secure, saline dwell in lumens, no order for heparin for lumens  Complications During Treatment: Yes, fever, high HR  Ultrafiltration Treatment: No  Medications Administered During Dialysis: See MAR  Blood Products Administered During Dialysis: No  Labs Sent During Dialysis: No  Heparin Drip Rate Changes: N/A  Dialysis Catheter Dressing: clean/dry/intact  Last Dressing Change: 3/19/25    Last Updated: 10:05 PM by АЛЕКСАНДР TURCIOS

## 2025-03-20 NOTE — PROGRESS NOTES
Tana Hargrove is a 79 y.o. female on day 1 of admission presenting with Acute hypoxic respiratory failure (Multi).      Subjective     Tana Hargrove is a 79 y.o. female with PMHx s/f atrial fibrillation not on anticoagulation therapy, chronic kidney disease on hemodialysis with Dr. Avelar, myelodysplastic syndrome with iron deficiency anemia followed by Dr. Ulloa, hypertension, diabetes, arthritis, coronary artery disease, diastolic heart failure, COPD, paroxysmal atrial fibrillation not on anticoagulation therapy no Watchman device secondary to blood disorder who presented to the hospital complaining of generalized weakness over the last several days.  The patient reports that her hemoglobin was only 6 she did not have her last dialysis session.  She has been having generalized weakness she did have a fall landing down onto the ground without hitting her head.  Patient also has cough shortness of breath.  She is very chilled right now and shivering in the emergency department.  She is not having any abdominal pain nausea vomiting diarrhea no urinary symptoms.  She does have other family members who are having similar symptoms.  In the emergency department patient tested positive for influenza.  Her blood work showed hemoglobin of 6.7.  The patient was subsequently discussed with the ED provider the plan is to admit the patient to continue treatment for influenza and to obtain hematology consultation and consider transfusion for her anemia with hemoglobin of 6.7 she does have a chronically elevated ferritin level due to many transfusions.  Due to the comorbidities present with the blood dyscrasia and underlying COPD with superimposed influenza patient will likely need more than 2 midnights hospital stay for full evaluation and workup.        ED Course (Summary - please note all labs, imaging studies, and interventions noted below have been personally reviewed and/or interpreted on day of admission):   Vitals on  presentation: T 37.6 °C (99.7 °F)  HR 98  /73  RR (!) 22  O2 (!) 87 % None (Room air)  Labs:   CBC with WBC 4.3, Hgb 6.7, Plts 206.   CMP with glucose 179, Na 137, K 4.4, BUN 30, sCr 2.42, alk phos 87, ALT 16, AST 15, bilirubin 1.0. Magnesium 1.67.   . Trop 17.   Lactate 1.1  EKG: The EKG shows normal sinus rhythm no acute ST wave segment elevation to suggest ischemia no intraventricular conduction delay.  Imaging: Chest x-ray shows unchanged stable right hemidiaphragmatic elevation no acute pulmonary findings  Interventions: Patient was given nebulizer treatments and referred for admission          3.20.25 The patient's hemoglobin dropped to 5.4 she has since been transfused w 2 units, she remains weak, short of breath. Pt with fever most of the day and night yesterday, but none today.          Review of Systems   Constitutional:  Positive for chills, fatigue and fever.   Respiratory:  Positive for cough and wheezing.    Neurological:  Positive for weakness.          Objective     Last Recorded Vitals  /64 (BP Location: Right arm, Patient Position: Lying)   Pulse 73   Temp 36.6 °C (97.9 °F) (Temporal)   Resp 16   Wt 70 kg (154 lb 5.2 oz)   SpO2 94%     Image Results  ECG 12 lead    Result Date: 3/19/2025  Sinus rhythm Low voltage, precordial leads See ED provider note for full interpretation and clinical correlation Confirmed by Terri Bermudez ([id number]) on 3/19/2025 3:46:43 PM    XR chest 2 views    Result Date: 3/19/2025  Interpreted By:  Pauline Adame, STUDY: XR CHEST 2 VIEWS;  3/19/2025 9:16 am   INDICATION: Signs/Symptoms:fever and cough.     COMPARISON: 09/20/2024   ACCESSION NUMBER(S): [id number]   ORDERING CLINICIAN: MERARY HERNANDEZ   FINDINGS: AP upright and lateral views were obtained with the patient sitting.   Right internal jugular dual lumen dialysis catheter tip projects in the right atrium.   CARDIOMEDIASTINAL SILHOUETTE: Cardiac silhouette is enlarged and appears  larger than on the previous exam. This may be accentuated by the very shallow inspiration.   LUNGS: Right hemidiaphragm is elevated relative to the left hemidiaphragm, not significantly changed from the prior study. Overall the lung volumes are shallow. There is no focal consolidation noted. No pleural effusion is identified.   ABDOMEN: No remarkable upper abdominal findings.   BONES: No acute osseous changes.       1.  Shallow inspiration with no acute pulmonary finding 2. Unchanged elevation of the right hemidiaphragm 3. Cardiomegaly appears greater than on the prior study       MACRO: None   Signed by: Pauline Adame 3/19/2025 9:51 AM Dictation workstation:   [id number]       Lab Results  Results for orders placed or performed during the hospital encounter of 03/19/25 (from the past 24 hours)   POCT GLUCOSE   Result Value Ref Range    POCT Glucose 164 (H) 74 - 99 mg/dL   POCT GLUCOSE   Result Value Ref Range    POCT Glucose 113 (H) 74 - 99 mg/dL   Blood Culture    Specimen: Peripheral Venipuncture; Blood culture   Result Value Ref Range    Blood Culture Loaded on Instrument - Culture in progress    Blood Culture    Specimen: Peripheral Venipuncture; Blood culture   Result Value Ref Range    Blood Culture Loaded on Instrument - Culture in progress    CBC   Result Value Ref Range    WBC 4.2 (L) 4.4 - 11.3 x10*3/uL    nRBC 0.0 0.0 - 0.0 /100 WBCs    RBC 1.61 (L) 4.00 - 5.20 x10*6/uL    Hemoglobin 5.4 (LL) 12.0 - 16.0 g/dL    Hematocrit 16.3 (L) 36.0 - 46.0 %     (H) 80 - 100 fL    MCH 33.5 26.0 - 34.0 pg    MCHC 33.1 32.0 - 36.0 g/dL    RDW 21.5 (H) 11.5 - 14.5 %    Platelets 156 150 - 450 x10*3/uL   Basic metabolic panel   Result Value Ref Range    Glucose 172 (H) 74 - 99 mg/dL    Sodium 136 136 - 145 mmol/L    Potassium 3.7 3.5 - 5.3 mmol/L    Chloride 101 98 - 107 mmol/L    Bicarbonate 29 21 - 32 mmol/L    Anion Gap 10 10 - 20 mmol/L    Urea Nitrogen 14 6 - 23 mg/dL    Creatinine 1.26 (H) 0.50 - 1.05  mg/dL    eGFR 43 (L) >60 mL/min/1.73m*2    Calcium 8.4 (L) 8.6 - 10.3 mg/dL   Prepare RBC: 2 Units   Result Value Ref Range    PRODUCT CODE O3979Q53     Unit Number [id number]     Unit ABO A     Unit RH NEG     XM INTEP COMP     Dispense Status TR     Blood Expiration Date 4/15/2025 11:59:00 PM EDT     PRODUCT BLOOD TYPE 0600     UNIT VOLUME 350     PRODUCT CODE F0161R25     Unit Number [id number]     Unit ABO A     Unit RH NEG     XM INTEP COMP     Dispense Status TR     Blood Expiration Date 4/3/2025 11:59:00 PM EDT     PRODUCT BLOOD TYPE 0600     UNIT VOLUME 285    Type and screen   Result Value Ref Range    ABO TYPE A     Rh TYPE POS     ANTIBODY SCREEN NEG    POCT GLUCOSE   Result Value Ref Range    POCT Glucose 135 (H) 74 - 99 mg/dL   POCT GLUCOSE   Result Value Ref Range    POCT Glucose 150 (H) 74 - 99 mg/dL     *Note: Due to a large number of results and/or encounters for the requested time period, some results have not been displayed. A complete set of results can be found in Results Review.        Medications  Scheduled medications:  allopurinol, 100 mg, oral, BID  amLODIPine, 10 mg, oral, Daily  atorvastatin, 10 mg, oral, Nightly  carvedilol, 25 mg, oral, BID  cholecalciferol, 2,000 Units, oral, Daily  cyanocobalamin, 1,000 mcg, oral, Daily  [START ON 3/21/2025] epoetin alejandra or biosimilar, 28,000 Units, subcutaneous, Once per day on Monday Wednesday Friday  guaiFENesin, 1,200 mg, oral, BID  insulin lispro, 0-10 Units, subcutaneous, TID AC  ipratropium-albuteroL, 3 mL, nebulization, TID  losartan, 50 mg, oral, Daily  [START ON 3/21/2025] oseltamivir, 30 mg, oral, Once per day on Monday Wednesday Friday  pantoprazole, 40 mg, oral, Daily before breakfast   Or  pantoprazole, 40 mg, intravenous, Daily before breakfast  pioglitazone, 15 mg, oral, Daily  polyethylene glycol, 17 g, oral, Daily  pregabalin, 75 mg, oral, Daily      Continuous medications:     PRN medications:  PRN medications:  acetaminophen, benzocaine-menthol, bisacodyl, dextrose, dextrose, glucagon, glucagon, ipratropium-albuteroL, melatonin, ondansetron ODT **OR** ondansetron     Physical Exam  Vitals reviewed.   Constitutional:       General: She is not in acute distress.  HENT:      Head: Normocephalic and atraumatic.      Right Ear: External ear normal.      Left Ear: External ear normal.      Nose: Nose normal.      Mouth/Throat:      Mouth: Mucous membranes are moist.      Pharynx: Oropharynx is clear.   Eyes:      Extraocular Movements: Extraocular movements intact.      Conjunctiva/sclera: Conjunctivae normal.      Pupils: Pupils are equal, round, and reactive to light.   Cardiovascular:      Rate and Rhythm: Normal rate and regular rhythm.      Pulses: Normal pulses.      Heart sounds: Normal heart sounds.   Pulmonary:      Effort: Pulmonary effort is normal. No respiratory distress.      Breath sounds: Wheezing present. No rhonchi or rales.   Chest:      Chest wall: No tenderness.   Abdominal:      General: Bowel sounds are normal. There is no distension.      Palpations: Abdomen is soft. There is no mass.      Tenderness: There is no abdominal tenderness. There is no rebound.   Musculoskeletal:         General: No swelling or deformity. Normal range of motion.      Cervical back: Normal range of motion.   Skin:     General: Skin is warm and dry.      Capillary Refill: Capillary refill takes less than 2 seconds.   Neurological:      General: No focal deficit present.      Mental Status: She is alert and oriented to person, place, and time.   Psychiatric:         Mood and Affect: Mood normal.         Behavior: Behavior normal.                  Assessment/Plan      79 y.o. female with PMHx s/f atrial fibrillation not on anticoagulation therapy, chronic kidney disease on hemodialysis with Dr. Avelar, myelodysplastic syndrome with iron deficiency anemia followed by Dr. Ulloa, hypertension, diabetes, arthritis, coronary artery  disease, diastolic heart failure, COPD, paroxysmal atrial fibrillation not on anticoagulation therapy no Watchman device secondary to blood disorder who presented to the hospital complaining of generalized weakness over the last several days.       Acute hypoxic respiratory failure secondary to history of COPD and superimposed influenza A  Patient is continued on Tamiflu renal dosing   We will continue the patient on nebulizer treatments and supplemental oxygen  Mucinex and flutter valve to improve expectoration     Myelodysplastic syndrome with chronic iron deficiency anemia  Pt transfused 2 units this am   Hematology following     Chronic kidney disease stage V on hemodialysis  Patient normally dialyzed on Tuesdays Thursdays and Saturday but missed dialysis yesterday  Dialyzed last night     Coronary artery disease, diastolic CHF, atrial fibrillation  Patient will be resumed on her normal cardiac medications including her carvedilol amlodipine  She is not complaining of any chest pain her heart rate is controlled  The patient is not on anticoagulation therapy and has not had a Watchman placed due to her severe blood dyscrasias and inability to tolerate blood thinners     Diabetes type 2  Placed on sliding scale insulin coverage                   Code Status: Full Code        DVT ppx:      Please see orders for more complete plan    Jose Miguel Armendariz, APRN-CNP

## 2025-03-21 ENCOUNTER — APPOINTMENT (OUTPATIENT)
Dept: DIALYSIS | Facility: HOSPITAL | Age: 80
End: 2025-03-21
Payer: MEDICARE

## 2025-03-21 ENCOUNTER — APPOINTMENT (OUTPATIENT)
Dept: RADIOLOGY | Facility: HOSPITAL | Age: 80
DRG: 193 | End: 2025-03-21
Payer: MEDICARE

## 2025-03-21 LAB
ANION GAP SERPL CALC-SCNC: 8 MMOL/L (ref 10–20)
BUN SERPL-MCNC: 29 MG/DL (ref 6–23)
CALCIUM SERPL-MCNC: 8.5 MG/DL (ref 8.6–10.3)
CHLORIDE SERPL-SCNC: 102 MMOL/L (ref 98–107)
CO2 SERPL-SCNC: 29 MMOL/L (ref 21–32)
CREAT SERPL-MCNC: 2.34 MG/DL (ref 0.5–1.05)
EGFRCR SERPLBLD CKD-EPI 2021: 21 ML/MIN/1.73M*2
ERYTHROCYTE [DISTWIDTH] IN BLOOD BY AUTOMATED COUNT: 22.6 % (ref 11.5–14.5)
GLUCOSE BLD MANUAL STRIP-MCNC: 125 MG/DL (ref 74–99)
GLUCOSE BLD MANUAL STRIP-MCNC: 138 MG/DL (ref 74–99)
GLUCOSE BLD MANUAL STRIP-MCNC: 160 MG/DL (ref 74–99)
GLUCOSE BLD MANUAL STRIP-MCNC: 95 MG/DL (ref 74–99)
GLUCOSE SERPL-MCNC: 120 MG/DL (ref 74–99)
HCT VFR BLD AUTO: 22.6 % (ref 36–46)
HGB BLD-MCNC: 7.5 G/DL (ref 12–16)
MCH RBC QN AUTO: 32.1 PG (ref 26–34)
MCHC RBC AUTO-ENTMCNC: 33.2 G/DL (ref 32–36)
MCV RBC AUTO: 97 FL (ref 80–100)
NRBC BLD-RTO: 0 /100 WBCS (ref 0–0)
PLATELET # BLD AUTO: 145 X10*3/UL (ref 150–450)
POTASSIUM SERPL-SCNC: 3.7 MMOL/L (ref 3.5–5.3)
RBC # BLD AUTO: 2.34 X10*6/UL (ref 4–5.2)
SODIUM SERPL-SCNC: 135 MMOL/L (ref 136–145)
WBC # BLD AUTO: 4.1 X10*3/UL (ref 4.4–11.3)

## 2025-03-21 PROCEDURE — 94669 MECHANICAL CHEST WALL OSCILL: CPT

## 2025-03-21 PROCEDURE — 6350000001 HC RX 635 EPOETIN >10,000 UNITS: Performed by: INTERNAL MEDICINE

## 2025-03-21 PROCEDURE — 94640 AIRWAY INHALATION TREATMENT: CPT

## 2025-03-21 PROCEDURE — 94667 MNPJ CHEST WALL 1ST: CPT

## 2025-03-21 PROCEDURE — 82947 ASSAY GLUCOSE BLOOD QUANT: CPT

## 2025-03-21 PROCEDURE — 1200000002 HC GENERAL ROOM WITH TELEMETRY DAILY

## 2025-03-21 PROCEDURE — 86902 BLOOD TYPE ANTIGEN DONOR EA: CPT

## 2025-03-21 PROCEDURE — 2500000001 HC RX 250 WO HCPCS SELF ADMINISTERED DRUGS (ALT 637 FOR MEDICARE OP): Performed by: NURSE PRACTITIONER

## 2025-03-21 PROCEDURE — 36415 COLL VENOUS BLD VENIPUNCTURE: CPT | Performed by: NURSE PRACTITIONER

## 2025-03-21 PROCEDURE — 85027 COMPLETE CBC AUTOMATED: CPT | Performed by: NURSE PRACTITIONER

## 2025-03-21 PROCEDURE — 2500000004 HC RX 250 GENERAL PHARMACY W/ HCPCS (ALT 636 FOR OP/ED): Performed by: NURSE PRACTITIONER

## 2025-03-21 PROCEDURE — 71045 X-RAY EXAM CHEST 1 VIEW: CPT

## 2025-03-21 PROCEDURE — 2500000002 HC RX 250 W HCPCS SELF ADMINISTERED DRUGS (ALT 637 FOR MEDICARE OP, ALT 636 FOR OP/ED): Performed by: INTERNAL MEDICINE

## 2025-03-21 PROCEDURE — 2500000002 HC RX 250 W HCPCS SELF ADMINISTERED DRUGS (ALT 637 FOR MEDICARE OP, ALT 636 FOR OP/ED): Performed by: NURSE PRACTITIONER

## 2025-03-21 PROCEDURE — 80048 BASIC METABOLIC PNL TOTAL CA: CPT | Performed by: NURSE PRACTITIONER

## 2025-03-21 PROCEDURE — 8010000001 HC DIALYSIS - HEMODIALYSIS PER DAY

## 2025-03-21 PROCEDURE — 99232 SBSQ HOSP IP/OBS MODERATE 35: CPT | Performed by: NURSE PRACTITIONER

## 2025-03-21 RX ORDER — CEFTRIAXONE 2 G/50ML
2 INJECTION, SOLUTION INTRAVENOUS EVERY 24 HOURS
Status: DISPENSED | OUTPATIENT
Start: 2025-03-21

## 2025-03-21 RX ORDER — IPRATROPIUM BROMIDE AND ALBUTEROL SULFATE 2.5; .5 MG/3ML; MG/3ML
3 SOLUTION RESPIRATORY (INHALATION) 3 TIMES DAILY
Status: DISCONTINUED | OUTPATIENT
Start: 2025-03-21 | End: 2025-03-23

## 2025-03-21 RX ORDER — IPRATROPIUM BROMIDE AND ALBUTEROL SULFATE 2.5; .5 MG/3ML; MG/3ML
3 SOLUTION RESPIRATORY (INHALATION) 3 TIMES DAILY
Status: DISCONTINUED | OUTPATIENT
Start: 2025-03-21 | End: 2025-03-21

## 2025-03-21 RX ADMIN — PIOGLITAZONE HYDROCHLORIDE 15 MG: 30 TABLET ORAL at 08:38

## 2025-03-21 RX ADMIN — Medication 1000 MCG: at 08:38

## 2025-03-21 RX ADMIN — ALLOPURINOL 100 MG: 100 TABLET ORAL at 21:08

## 2025-03-21 RX ADMIN — IPRATROPIUM BROMIDE AND ALBUTEROL SULFATE 3 ML: 2.5; .5 SOLUTION RESPIRATORY (INHALATION) at 11:12

## 2025-03-21 RX ADMIN — GUAIFENESIN 1200 MG: 600 TABLET ORAL at 21:08

## 2025-03-21 RX ADMIN — AMLODIPINE BESYLATE 10 MG: 10 TABLET ORAL at 08:38

## 2025-03-21 RX ADMIN — GUAIFENESIN 1200 MG: 600 TABLET ORAL at 08:38

## 2025-03-21 RX ADMIN — EPOETIN ALFA-EPBX 28000 UNITS: 20000 INJECTION, SOLUTION INTRAVENOUS; SUBCUTANEOUS at 17:42

## 2025-03-21 RX ADMIN — LOSARTAN POTASSIUM 50 MG: 50 TABLET, FILM COATED ORAL at 08:38

## 2025-03-21 RX ADMIN — ACETAMINOPHEN 650 MG: 325 TABLET ORAL at 10:42

## 2025-03-21 RX ADMIN — DOXYCYCLINE 100 MG: 100 INJECTION, POWDER, LYOPHILIZED, FOR SOLUTION INTRAVENOUS at 13:15

## 2025-03-21 RX ADMIN — PANTOPRAZOLE SODIUM 40 MG: 40 INJECTION, POWDER, FOR SOLUTION INTRAVENOUS at 05:35

## 2025-03-21 RX ADMIN — OSELTAMAVIR PHOSPHATE 30 MG: 30 CAPSULE ORAL at 08:38

## 2025-03-21 RX ADMIN — IPRATROPIUM BROMIDE AND ALBUTEROL SULFATE 3 ML: 2.5; .5 SOLUTION RESPIRATORY (INHALATION) at 07:24

## 2025-03-21 RX ADMIN — Medication 2000 UNITS: at 08:38

## 2025-03-21 RX ADMIN — ATORVASTATIN CALCIUM 10 MG: 10 TABLET, FILM COATED ORAL at 21:08

## 2025-03-21 RX ADMIN — PREGABALIN 75 MG: 75 CAPSULE ORAL at 08:38

## 2025-03-21 RX ADMIN — CARVEDILOL 25 MG: 25 TABLET, FILM COATED ORAL at 08:38

## 2025-03-21 RX ADMIN — CARVEDILOL 25 MG: 25 TABLET, FILM COATED ORAL at 21:08

## 2025-03-21 RX ADMIN — ALLOPURINOL 100 MG: 100 TABLET ORAL at 08:38

## 2025-03-21 RX ADMIN — CEFTRIAXONE 2 G: 2 INJECTION, SOLUTION INTRAVENOUS at 10:37

## 2025-03-21 ASSESSMENT — COGNITIVE AND FUNCTIONAL STATUS - GENERAL
DRESSING REGULAR UPPER BODY CLOTHING: A LITTLE
TOILETING: A LITTLE
DAILY ACTIVITIY SCORE: 21
STANDING UP FROM CHAIR USING ARMS: A LITTLE
HELP NEEDED FOR BATHING: A LITTLE
STANDING UP FROM CHAIR USING ARMS: A LITTLE
WALKING IN HOSPITAL ROOM: A LITTLE
DRESSING REGULAR UPPER BODY CLOTHING: A LITTLE
MOBILITY SCORE: 21
WALKING IN HOSPITAL ROOM: A LITTLE
DRESSING REGULAR LOWER BODY CLOTHING: A LITTLE
DAILY ACTIVITIY SCORE: 20
DRESSING REGULAR LOWER BODY CLOTHING: A LITTLE
MOVING TO AND FROM BED TO CHAIR: A LITTLE
CLIMB 3 TO 5 STEPS WITH RAILING: A LITTLE
MOBILITY SCORE: 19
HELP NEEDED FOR BATHING: A LITTLE
CLIMB 3 TO 5 STEPS WITH RAILING: A LOT

## 2025-03-21 ASSESSMENT — ENCOUNTER SYMPTOMS
WHEEZING: 1
CHILLS: 1
FATIGUE: 1
FEVER: 1
COUGH: 1
WEAKNESS: 1

## 2025-03-21 ASSESSMENT — PAIN SCALES - GENERAL
PAINLEVEL_OUTOF10: 0 - NO PAIN

## 2025-03-21 ASSESSMENT — PAIN - FUNCTIONAL ASSESSMENT
PAIN_FUNCTIONAL_ASSESSMENT: 0-10
PAIN_FUNCTIONAL_ASSESSMENT: 0-10

## 2025-03-21 NOTE — PROGRESS NOTES
Tana Hargrove is a 79 y.o. female on day 2 of admission presenting with Acute hypoxic respiratory failure (Multi).      Subjective     Tana Hargrove is a 79 y.o. female with PMHx s/f atrial fibrillation not on anticoagulation therapy, chronic kidney disease on hemodialysis with Dr. Avelar, myelodysplastic syndrome with iron deficiency anemia followed by Dr. Ulloa, hypertension, diabetes, arthritis, coronary artery disease, diastolic heart failure, COPD, paroxysmal atrial fibrillation not on anticoagulation therapy no Watchman device secondary to blood disorder who presented to the hospital complaining of generalized weakness over the last several days.  The patient reports that her hemoglobin was only 6 she did not have her last dialysis session.  She has been having generalized weakness she did have a fall landing down onto the ground without hitting her head.  Patient also has cough shortness of breath.  She is very chilled right now and shivering in the emergency department.  She is not having any abdominal pain nausea vomiting diarrhea no urinary symptoms.  She does have other family members who are having similar symptoms.  In the emergency department patient tested positive for influenza.  Her blood work showed hemoglobin of 6.7.  The patient was subsequently discussed with the ED provider the plan is to admit the patient to continue treatment for influenza and to obtain hematology consultation and consider transfusion for her anemia with hemoglobin of 6.7 she does have a chronically elevated ferritin level due to many transfusions.  Due to the comorbidities present with the blood dyscrasia and underlying COPD with superimposed influenza patient will likely need more than 2 midnights hospital stay for full evaluation and workup.        ED Course (Summary - please note all labs, imaging studies, and interventions noted below have been personally reviewed and/or interpreted on day of admission):   Vitals on  presentation: T 37.6 °C (99.7 °F)  HR 98  /73  RR (!) 22  O2 (!) 87 % None (Room air)  Labs:   CBC with WBC 4.3, Hgb 6.7, Plts 206.   CMP with glucose 179, Na 137, K 4.4, BUN 30, sCr 2.42, alk phos 87, ALT 16, AST 15, bilirubin 1.0. Magnesium 1.67.   . Trop 17.   Lactate 1.1  EKG: The EKG shows normal sinus rhythm no acute ST wave segment elevation to suggest ischemia no intraventricular conduction delay.  Imaging: Chest x-ray shows unchanged stable right hemidiaphragmatic elevation no acute pulmonary findings  Interventions: Patient was given nebulizer treatments and referred for admission          3.20.25 The patient's hemoglobin dropped to 5.4 she has since been transfused w 2 units, she remains weak, short of breath. Pt with fever most of the day and night yesterday, but none today.     3.21.25 pt with persistent weakness, hbg post 2 uinits RBC 7.5. Will add rocephin/doxycycline check procal level, cxr as patient is at risk for secondary bacterial pneumonia.          Review of Systems   Constitutional:  Positive for chills, fatigue and fever.   Respiratory:  Positive for cough and wheezing.    Neurological:  Positive for weakness.          Objective     Last Recorded Vitals  /62 (BP Location: Right arm, Patient Position: Lying)   Pulse (!) 111   Temp (!) 38.3 °C (100.9 °F) (Temporal) Comment: Tabitha SUE Rn notified  Resp 19   Wt 70 kg (154 lb 5.2 oz)   SpO2 93%     Image Results  ECG 12 lead    Result Date: 3/19/2025  Sinus rhythm Low voltage, precordial leads See ED provider note for full interpretation and clinical correlation Confirmed by Terri Bermudez ([id number]) on 3/19/2025 3:46:43 PM    XR chest 2 views    Result Date: 3/19/2025  Interpreted By:  Pauline Adame, STUDY: XR CHEST 2 VIEWS;  3/19/2025 9:16 am   INDICATION: Signs/Symptoms:fever and cough.     COMPARISON: 09/20/2024   ACCESSION NUMBER(S): [id number]   ORDERING CLINICIAN: MERARY HERNANDEZ   FINDINGS: AP upright and  lateral views were obtained with the patient sitting.   Right internal jugular dual lumen dialysis catheter tip projects in the right atrium.   CARDIOMEDIASTINAL SILHOUETTE: Cardiac silhouette is enlarged and appears larger than on the previous exam. This may be accentuated by the very shallow inspiration.   LUNGS: Right hemidiaphragm is elevated relative to the left hemidiaphragm, not significantly changed from the prior study. Overall the lung volumes are shallow. There is no focal consolidation noted. No pleural effusion is identified.   ABDOMEN: No remarkable upper abdominal findings.   BONES: No acute osseous changes.       1.  Shallow inspiration with no acute pulmonary finding 2. Unchanged elevation of the right hemidiaphragm 3. Cardiomegaly appears greater than on the prior study       MACRO: None   Signed by: Pauline Adame 3/19/2025 9:51 AM Dictation workstation:   [id number]       Lab Results  Results for orders placed or performed during the hospital encounter of 03/19/25 (from the past 24 hours)   POCT GLUCOSE   Result Value Ref Range    POCT Glucose 150 (H) 74 - 99 mg/dL   POCT GLUCOSE   Result Value Ref Range    POCT Glucose 127 (H) 74 - 99 mg/dL   POCT GLUCOSE   Result Value Ref Range    POCT Glucose 134 (H) 74 - 99 mg/dL   Basic Metabolic Panel   Result Value Ref Range    Glucose 120 (H) 74 - 99 mg/dL    Sodium 135 (L) 136 - 145 mmol/L    Potassium 3.7 3.5 - 5.3 mmol/L    Chloride 102 98 - 107 mmol/L    Bicarbonate 29 21 - 32 mmol/L    Anion Gap 8 (L) 10 - 20 mmol/L    Urea Nitrogen 29 (H) 6 - 23 mg/dL    Creatinine 2.34 (H) 0.50 - 1.05 mg/dL    eGFR 21 (L) >60 mL/min/1.73m*2    Calcium 8.5 (L) 8.6 - 10.3 mg/dL   CBC   Result Value Ref Range    WBC 4.1 (L) 4.4 - 11.3 x10*3/uL    nRBC 0.0 0.0 - 0.0 /100 WBCs    RBC 2.34 (L) 4.00 - 5.20 x10*6/uL    Hemoglobin 7.5 (L) 12.0 - 16.0 g/dL    Hematocrit 22.6 (L) 36.0 - 46.0 %    MCV 97 80 - 100 fL    MCH 32.1 26.0 - 34.0 pg    MCHC 33.2 32.0 - 36.0 g/dL     RDW 22.6 (H) 11.5 - 14.5 %    Platelets 145 (L) 150 - 450 x10*3/uL   POCT GLUCOSE   Result Value Ref Range    POCT Glucose 138 (H) 74 - 99 mg/dL     *Note: Due to a large number of results and/or encounters for the requested time period, some results have not been displayed. A complete set of results can be found in Results Review.        Medications  Scheduled medications:  allopurinol, 100 mg, oral, BID  amLODIPine, 10 mg, oral, Daily  atorvastatin, 10 mg, oral, Nightly  carvedilol, 25 mg, oral, BID  cefTRIAXone, 2 g, intravenous, q24h  cholecalciferol, 2,000 Units, oral, Daily  cyanocobalamin, 1,000 mcg, oral, Daily  doxycycline, 100 mg, intravenous, q12h  epoetin alejandra or biosimilar, 28,000 Units, subcutaneous, Once per day on Monday Wednesday Friday  guaiFENesin, 1,200 mg, oral, BID  insulin lispro, 0-10 Units, subcutaneous, TID AC  ipratropium-albuteroL, 3 mL, nebulization, TID  losartan, 50 mg, oral, Daily  oseltamivir, 30 mg, oral, Once per day on Monday Wednesday Friday  pantoprazole, 40 mg, oral, Daily before breakfast   Or  pantoprazole, 40 mg, intravenous, Daily before breakfast  pioglitazone, 15 mg, oral, Daily  polyethylene glycol, 17 g, oral, Daily  pregabalin, 75 mg, oral, Daily      Continuous medications:     PRN medications:  PRN medications: acetaminophen, benzocaine-menthol, bisacodyl, dextrose, dextrose, glucagon, glucagon, ipratropium-albuteroL, melatonin, ondansetron ODT **OR** ondansetron, oxygen     Physical Exam  Vitals reviewed.   Constitutional:       General: She is not in acute distress.  HENT:      Head: Normocephalic and atraumatic.      Right Ear: External ear normal.      Left Ear: External ear normal.      Nose: Nose normal.      Mouth/Throat:      Mouth: Mucous membranes are moist.      Pharynx: Oropharynx is clear.   Eyes:      Extraocular Movements: Extraocular movements intact.      Conjunctiva/sclera: Conjunctivae normal.      Pupils: Pupils are equal, round, and reactive  to light.   Cardiovascular:      Rate and Rhythm: Normal rate and regular rhythm.      Pulses: Normal pulses.      Heart sounds: Normal heart sounds.   Pulmonary:      Effort: Pulmonary effort is normal. No respiratory distress.      Breath sounds: Wheezing and rhonchi present. No rales.   Chest:      Chest wall: No tenderness.   Abdominal:      General: Bowel sounds are normal. There is no distension.      Palpations: Abdomen is soft. There is no mass.      Tenderness: There is no abdominal tenderness. There is no rebound.   Musculoskeletal:         General: No swelling or deformity. Normal range of motion.      Cervical back: Normal range of motion.   Skin:     General: Skin is warm and dry.      Capillary Refill: Capillary refill takes less than 2 seconds.   Neurological:      General: No focal deficit present.      Mental Status: She is alert and oriented to person, place, and time.      Comments: Slow movements, slow thought processessing   Psychiatric:         Mood and Affect: Mood normal.         Behavior: Behavior normal.                  Assessment/Plan      79 y.o. female with PMHx s/f atrial fibrillation not on anticoagulation therapy, chronic kidney disease on hemodialysis with Dr. Avelar, myelodysplastic syndrome with iron deficiency anemia followed by Dr. Ulloa, hypertension, diabetes, arthritis, coronary artery disease, diastolic heart failure, COPD, paroxysmal atrial fibrillation not on anticoagulation therapy no Watchman device secondary to blood disorder who presented to the hospital complaining of generalized weakness over the last several days.       Acute hypoxic respiratory failure secondary to history of COPD and superimposed influenza A  Patient is continued on Tamiflu renal dosing   We will continue the patient on nebulizer treatments and supplemental oxygen  Mucinex and flutter valve to improve expectoration  Cough is very weak    Increased concern for secondary bacterial pneumonia  Initial  CXR negative, but patient is persistently leukopenic and febrile  She is not expectorating well   Start rocephin and doxycycline, repeat CXR and check procal level     Myelodysplastic syndrome with chronic iron deficiency anemia  Pt transfused 2 units this am   Hematology following     Chronic kidney disease stage V on hemodialysis  Patient normally dialyzed on Tuesdays Thursdays and Saturday but missed dialysis yesterday  Dialyzed last night     Coronary artery disease, diastolic CHF, atrial fibrillation  Patient will be resumed on her normal cardiac medications including her carvedilol amlodipine  She is not complaining of any chest pain her heart rate is controlled  The patient is not on anticoagulation therapy and has not had a Watchman placed due to her severe blood dyscrasias and inability to tolerate blood thinners     Diabetes type 2  Placed on sliding scale insulin coverage                   Code Status: Full Code             Please see orders for more complete plan    Jose Miguel Armendariz, APRN-CNP

## 2025-03-21 NOTE — PRE-PROCEDURE NOTE
Report from Sending RN:    Report From: Tabitha Cronin RN  Recent Surgery of Procedure: No  Baseline Level of Consciousness (LOC): A&Ox4  Oxygen Use: Yes  Type: 2L NC  Diabetic: Yes  Last BP Med Given Day of Dialysis: See MAR  Last Pain Med Given: See MAR  Lab Tests to be Obtained with Dialysis: No  Blood Transfusion to be Given During Dialysis: No  Available IV Access: Yes  Medications to be Administered During Dialysis: Yes  Continuous IV Infusion Running: No  Restraints on Currently or in the Last 24 Hours: No  Hand-Off Communication: Full code; No heparin; Afib; Pt on precautions for Flu.

## 2025-03-21 NOTE — PROGRESS NOTES
"      Nephrology Progress Note      Nephrology following for ESRD.   Events over night: none    Seen in room. Very fatigued.  She does not remember getting her blood transfusion yesterday.    She told me she is going home tomorrow    /62 (BP Location: Right arm, Patient Position: Lying)   Pulse (!) 111   Temp (!) 38.3 °C (100.9 °F) (Temporal) Comment: Tabitha SUE Rn notified  Resp 19   Ht 1.549 m (5' 1\")   Wt 70 kg (154 lb 5.2 oz)   SpO2 94%   BMI 29.16 kg/m²     Input / Output:  24 HR:   Intake/Output Summary (Last 24 hours) at 3/21/2025 1154  Last data filed at 3/21/2025 1037  Gross per 24 hour   Intake 600 ml   Output 50 ml   Net 550 ml       Physical Exam   Some mild confusion  Neck: no JVD  CV: RRR  Lungs: CTA bilaterally  Abd: soft, NT, ND   Ext: no lower extremity edema    Scheduled medications  allopurinol, 100 mg, oral, BID  amLODIPine, 10 mg, oral, Daily  atorvastatin, 10 mg, oral, Nightly  carvedilol, 25 mg, oral, BID  cefTRIAXone, 2 g, intravenous, q24h  cholecalciferol, 2,000 Units, oral, Daily  cyanocobalamin, 1,000 mcg, oral, Daily  doxycycline, 100 mg, intravenous, q12h  epoetin alejandra or biosimilar, 28,000 Units, subcutaneous, Once per day on Monday Wednesday Friday  guaiFENesin, 1,200 mg, oral, BID  insulin lispro, 0-10 Units, subcutaneous, TID AC  ipratropium-albuteroL, 3 mL, nebulization, TID  losartan, 50 mg, oral, Daily  oseltamivir, 30 mg, oral, Once per day on Monday Wednesday Friday  pantoprazole, 40 mg, oral, Daily before breakfast   Or  pantoprazole, 40 mg, intravenous, Daily before breakfast  pioglitazone, 15 mg, oral, Daily  polyethylene glycol, 17 g, oral, Daily  pregabalin, 75 mg, oral, Daily      Continuous medications     PRN medications  PRN medications: acetaminophen, benzocaine-menthol, bisacodyl, dextrose, dextrose, glucagon, glucagon, ipratropium-albuteroL, melatonin, ondansetron ODT **OR** ondansetron, oxygen   Results from last 7 days   Lab Units 03/21/25  0236 " 03/20/25  0320 03/19/25  0833   SODIUM mmol/L 135*   < > 137   POTASSIUM mmol/L 3.7   < > 4.4   CHLORIDE mmol/L 102   < > 99   CO2 mmol/L 29   < > 29   BUN mg/dL 29*   < > 30*   CREATININE mg/dL 2.34*   < > 2.42*   CALCIUM mg/dL 8.5*   < > 9.4   PROTEIN TOTAL g/dL  --   --  6.4   BILIRUBIN TOTAL mg/dL  --   --  1.0   ALK PHOS U/L  --   --  87   ALT U/L  --   --  16   AST U/L  --   --  15   GLUCOSE mg/dL 120*   < > 179*    < > = values in this interval not displayed.           Results from last 7 days   Lab Units 03/21/25  0402 03/20/25  0320 03/19/25  0833   WBC AUTO x10*3/uL 4.1* 4.2* 4.3*   HEMOGLOBIN g/dL 7.5* 5.4* 6.7*   HEMATOCRIT % 22.6* 16.3* 20.5*   PLATELETS AUTO x10*3/uL 145* 156 206      Results from last 7 days   Lab Units 03/21/25  0402 03/20/25  0320 03/19/25  0833   SODIUM mmol/L 135* 136 137   POTASSIUM mmol/L 3.7 3.7 4.4   CHLORIDE mmol/L 102 101 99   CO2 mmol/L 29 29 29   BUN mg/dL 29* 14 30*   CREATININE mg/dL 2.34* 1.26* 2.42*   GLUCOSE mg/dL 120* 172* 179*   CALCIUM mg/dL 8.5* 8.4* 9.4       Assessment & Plan:      Patient is 79 y.o. female history of DM 2, HTN, ESRD, MDS who is admitted to hospital for influenza, fall. Nephrology consulted in view of ESRD.       ESRD  -HD [facility]3]  -Access is right IJ TDC     Anemia of ESRD and MDS  -On high-dose MARILYNN with dialysis, will continue here  -PRBC's today. Dr Medina following as well     CKD-MBD  -Not currently on binder, VDRL or calcium emetic  HTN  Acute hypoxic respiratory failure  Influenza A        Recommendations:   -HD today   -Continue Epogen 28,000 units Monday Wednesday Friday      Please message me through [employer] chat with any questions or concerns.     Pinky Christie DO  3/21/2025  11:54 AM     [facility]    [address], [address]   [place] [address]  Office: [phone]

## 2025-03-21 NOTE — PROGRESS NOTES
Occupational Therapy                 Therapy Communication Note    Patient Name: Tana Hargrove  MRN: [id number]  Department: [facility][note 3] 2 E  Room: [address] [place], [place] [address]  Today's Date: 3/21/2025     Discipline: Occupational Therapy    OT Missed Visit: Yes     Missed Visit Reason: Missed Visit Reason: Patient refused (Pt reports she is too fatigue for any therapy at this time. RN reports pt is due for dialysis and is very weak with gargled speech/ wet cough. Will re attempt as schedule allows.)    Missed Time: Attempt    Comment:

## 2025-03-21 NOTE — PROGRESS NOTES
Physical Therapy                 Therapy Communication Note    Patient Name: Tana Hargrove  MRN: [id number]  Department: [facility] 2 E  Room: [address] [place], [place] [address]  Today's Date: 3/21/2025     Discipline: Physical Therapy    PT Missed Visit: Yes     Missed Visit Reason: Missed Visit Reason: Patient refused (STATES SEHIS TOO FATIGUED FOR THERAPY TODAY)    Missed Time: Attempt    Comment:

## 2025-03-21 NOTE — CARE PLAN
The patient's goals for the shift include  Patient will receive hemodialysis and remain hemodynamically stable.    The clinical goals for the shift include Patient remains safe, comfortable and hemodynamically stable.

## 2025-03-21 NOTE — CARE PLAN
Problem: Pain - Adult  Goal: Verbalizes/displays adequate comfort level or baseline comfort level  Outcome: Progressing     Problem: Safety - Adult  Goal: Free from fall injury  Outcome: Progressing     Problem: Discharge Planning  Goal: Discharge to home or other facility with appropriate resources  Outcome: Progressing     Problem: Chronic Conditions and Co-morbidities  Goal: Patient's chronic conditions and co-morbidity symptoms are monitored and maintained or improved  Outcome: Progressing     Problem: Diabetes  Goal: Achieve decreasing blood glucose levels by end of shift  Outcome: Progressing  Goal: Increase stability of blood glucose readings by end of shift  Outcome: Progressing  Goal: Decrease in ketones present in urine by end of shift  Outcome: Progressing  Goal: Maintain electrolyte levels within acceptable range throughout shift  Outcome: Progressing  Goal: Maintain glucose levels >70mg/dl to <250mg/dl throughout shift  Outcome: Progressing  Goal: No changes in neurological exam by end of shift  Outcome: Progressing  Goal: Learn about and adhere to nutrition recommendations by end of shift  Outcome: Progressing  Goal: Vital signs within normal range for age by end of shift  Outcome: Progressing  Goal: Increase self care and/or family involovement by end of shift  Outcome: Progressing  Goal: Receive DSME education by end of shift  Outcome: Progressing     Problem: Nutrition  Goal: Nutrient intake appropriate for maintaining nutritional needs  Outcome: Progressing   The patient's goals for the shift include Patient will remain safe, comfortable and hemodynamically stable.     The clinical goals for the shift include Patient will remain afebrile.

## 2025-03-21 NOTE — POST-PROCEDURE NOTE
Report to Receiving RN:    Report To: Tabitha Cronin RN  Time Report Called: [id number]  Hand-Off Communication: 1L of fluid removed; Post BP was 120/63 p71; Pt up in bed eating dinner.  Complications During Treatment: No  Ultrafiltration Treatment: No  Medications Administered During Dialysis: No  Blood Products Administered During Dialysis: No  Labs Sent During Dialysis: No  Heparin Drip Rate Changes: N/A  Dialysis Catheter Dressing: D&I  Last Dressing Change: 3/19/25    Electronic Signatures:   (Signed Letha Pressley)     Last Updated: 5:56 PM by LETHA PRESSLEY

## 2025-03-22 LAB
ANION GAP SERPL CALC-SCNC: 17 MMOL/L (ref 10–20)
BUN SERPL-MCNC: 19 MG/DL (ref 6–23)
CALCIUM SERPL-MCNC: 8.2 MG/DL (ref 8.6–10.3)
CHLORIDE SERPL-SCNC: 98 MMOL/L (ref 98–107)
CO2 SERPL-SCNC: 28 MMOL/L (ref 21–32)
CREAT SERPL-MCNC: 1.74 MG/DL (ref 0.5–1.05)
EGFRCR SERPLBLD CKD-EPI 2021: 29 ML/MIN/1.73M*2
ERYTHROCYTE [DISTWIDTH] IN BLOOD BY AUTOMATED COUNT: 21.3 % (ref 11.5–14.5)
GLUCOSE BLD MANUAL STRIP-MCNC: 140 MG/DL (ref 74–99)
GLUCOSE BLD MANUAL STRIP-MCNC: 156 MG/DL (ref 74–99)
GLUCOSE BLD MANUAL STRIP-MCNC: 93 MG/DL (ref 74–99)
GLUCOSE SERPL-MCNC: 89 MG/DL (ref 74–99)
HCT VFR BLD AUTO: 21 % (ref 36–46)
HGB BLD-MCNC: 7 G/DL (ref 12–16)
MCH RBC QN AUTO: 32.1 PG (ref 26–34)
MCHC RBC AUTO-ENTMCNC: 33.3 G/DL (ref 32–36)
MCV RBC AUTO: 96 FL (ref 80–100)
NRBC BLD-RTO: 0 /100 WBCS (ref 0–0)
PLATELET # BLD AUTO: 105 X10*3/UL (ref 150–450)
POTASSIUM SERPL-SCNC: 3.9 MMOL/L (ref 3.5–5.3)
PROCALCITONIN SERPL-MCNC: 1.08 NG/ML
RBC # BLD AUTO: 2.18 X10*6/UL (ref 4–5.2)
SODIUM SERPL-SCNC: 139 MMOL/L (ref 136–145)
WBC # BLD AUTO: 2.2 X10*3/UL (ref 4.4–11.3)

## 2025-03-22 PROCEDURE — 99232 SBSQ HOSP IP/OBS MODERATE 35: CPT | Performed by: NURSE PRACTITIONER

## 2025-03-22 PROCEDURE — 94640 AIRWAY INHALATION TREATMENT: CPT

## 2025-03-22 PROCEDURE — 82374 ASSAY BLOOD CARBON DIOXIDE: CPT | Performed by: NURSE PRACTITIONER

## 2025-03-22 PROCEDURE — 2500000001 HC RX 250 WO HCPCS SELF ADMINISTERED DRUGS (ALT 637 FOR MEDICARE OP): Performed by: NURSE PRACTITIONER

## 2025-03-22 PROCEDURE — 97535 SELF CARE MNGMENT TRAINING: CPT | Mod: GO

## 2025-03-22 PROCEDURE — 85027 COMPLETE CBC AUTOMATED: CPT | Performed by: NURSE PRACTITIONER

## 2025-03-22 PROCEDURE — 1210000001 HC SEMI-PRIVATE ROOM DAILY

## 2025-03-22 PROCEDURE — 2500000005 HC RX 250 GENERAL PHARMACY W/O HCPCS: Performed by: NURSE PRACTITIONER

## 2025-03-22 PROCEDURE — 84145 PROCALCITONIN (PCT): CPT | Mod: PORLAB | Performed by: NURSE PRACTITIONER

## 2025-03-22 PROCEDURE — 36415 COLL VENOUS BLD VENIPUNCTURE: CPT | Performed by: NURSE PRACTITIONER

## 2025-03-22 PROCEDURE — 2500000005 HC RX 250 GENERAL PHARMACY W/O HCPCS: Performed by: STUDENT IN AN ORGANIZED HEALTH CARE EDUCATION/TRAINING PROGRAM

## 2025-03-22 PROCEDURE — 97165 OT EVAL LOW COMPLEX 30 MIN: CPT | Mod: GO

## 2025-03-22 PROCEDURE — 82947 ASSAY GLUCOSE BLOOD QUANT: CPT

## 2025-03-22 PROCEDURE — 2500000002 HC RX 250 W HCPCS SELF ADMINISTERED DRUGS (ALT 637 FOR MEDICARE OP, ALT 636 FOR OP/ED): Performed by: INTERNAL MEDICINE

## 2025-03-22 PROCEDURE — 97161 PT EVAL LOW COMPLEX 20 MIN: CPT | Mod: GP

## 2025-03-22 PROCEDURE — 80048 BASIC METABOLIC PNL TOTAL CA: CPT | Performed by: NURSE PRACTITIONER

## 2025-03-22 PROCEDURE — 2500000004 HC RX 250 GENERAL PHARMACY W/ HCPCS (ALT 636 FOR OP/ED): Performed by: NURSE PRACTITIONER

## 2025-03-22 PROCEDURE — 97530 THERAPEUTIC ACTIVITIES: CPT | Mod: GP

## 2025-03-22 RX ORDER — DOXYCYCLINE 100 MG/1
100 CAPSULE ORAL EVERY 12 HOURS SCHEDULED
Status: DISPENSED | OUTPATIENT
Start: 2025-03-22

## 2025-03-22 RX ADMIN — Medication 3 MG: at 20:18

## 2025-03-22 RX ADMIN — IPRATROPIUM BROMIDE AND ALBUTEROL SULFATE 3 ML: 2.5; .5 SOLUTION RESPIRATORY (INHALATION) at 08:04

## 2025-03-22 RX ADMIN — ALLOPURINOL 100 MG: 100 TABLET ORAL at 20:18

## 2025-03-22 RX ADMIN — PREGABALIN 75 MG: 75 CAPSULE ORAL at 09:03

## 2025-03-22 RX ADMIN — Medication 1 L/MIN: at 12:01

## 2025-03-22 RX ADMIN — CEFTRIAXONE 2 G: 2 INJECTION, SOLUTION INTRAVENOUS at 10:14

## 2025-03-22 RX ADMIN — Medication 2000 UNITS: at 09:03

## 2025-03-22 RX ADMIN — CARVEDILOL 25 MG: 25 TABLET, FILM COATED ORAL at 20:18

## 2025-03-22 RX ADMIN — GUAIFENESIN 1200 MG: 600 TABLET ORAL at 20:18

## 2025-03-22 RX ADMIN — IPRATROPIUM BROMIDE AND ALBUTEROL SULFATE 3 ML: 2.5; .5 SOLUTION RESPIRATORY (INHALATION) at 19:44

## 2025-03-22 RX ADMIN — CARVEDILOL 25 MG: 25 TABLET, FILM COATED ORAL at 09:03

## 2025-03-22 RX ADMIN — ACETAMINOPHEN 650 MG: 325 TABLET ORAL at 21:18

## 2025-03-22 RX ADMIN — DOXYCYCLINE 100 MG: 100 INJECTION, POWDER, LYOPHILIZED, FOR SOLUTION INTRAVENOUS at 12:11

## 2025-03-22 RX ADMIN — AMLODIPINE BESYLATE 10 MG: 10 TABLET ORAL at 09:03

## 2025-03-22 RX ADMIN — GUAIFENESIN 1200 MG: 600 TABLET ORAL at 09:03

## 2025-03-22 RX ADMIN — Medication 1000 MCG: at 09:03

## 2025-03-22 RX ADMIN — DOXYCYCLINE 100 MG: 100 CAPSULE ORAL at 20:18

## 2025-03-22 RX ADMIN — DOXYCYCLINE 100 MG: 100 INJECTION, POWDER, LYOPHILIZED, FOR SOLUTION INTRAVENOUS at 00:28

## 2025-03-22 RX ADMIN — ALLOPURINOL 100 MG: 100 TABLET ORAL at 09:03

## 2025-03-22 RX ADMIN — ATORVASTATIN CALCIUM 10 MG: 10 TABLET, FILM COATED ORAL at 20:18

## 2025-03-22 RX ADMIN — IPRATROPIUM BROMIDE AND ALBUTEROL SULFATE 3 ML: 2.5; .5 SOLUTION RESPIRATORY (INHALATION) at 11:32

## 2025-03-22 RX ADMIN — LOSARTAN POTASSIUM 50 MG: 50 TABLET, FILM COATED ORAL at 09:05

## 2025-03-22 RX ADMIN — PANTOPRAZOLE SODIUM 40 MG: 40 INJECTION, POWDER, FOR SOLUTION INTRAVENOUS at 04:53

## 2025-03-22 ASSESSMENT — COGNITIVE AND FUNCTIONAL STATUS - GENERAL
DRESSING REGULAR UPPER BODY CLOTHING: A LITTLE
DRESSING REGULAR UPPER BODY CLOTHING: A LITTLE
TURNING FROM BACK TO SIDE WHILE IN FLAT BAD: A LITTLE
TURNING FROM BACK TO SIDE WHILE IN FLAT BAD: A LOT
TOILETING: A LOT
DAILY ACTIVITIY SCORE: 15
WALKING IN HOSPITAL ROOM: A LITTLE
HELP NEEDED FOR BATHING: A LITTLE
DRESSING REGULAR LOWER BODY CLOTHING: A LOT
MOVING TO AND FROM BED TO CHAIR: A LITTLE
HELP NEEDED FOR BATHING: A LITTLE
HELP NEEDED FOR BATHING: A LOT
TOILETING: A LITTLE
DAILY ACTIVITIY SCORE: 20
MOBILITY SCORE: 18
TOILETING: A LITTLE
PERSONAL GROOMING: A LITTLE
DRESSING REGULAR LOWER BODY CLOTHING: A LITTLE
DRESSING REGULAR UPPER BODY CLOTHING: A LITTLE
CLIMB 3 TO 5 STEPS WITH RAILING: TOTAL
WALKING IN HOSPITAL ROOM: A LITTLE
CLIMB 3 TO 5 STEPS WITH RAILING: A LOT
MOBILITY SCORE: 18
CLIMB 3 TO 5 STEPS WITH RAILING: A LOT
WALKING IN HOSPITAL ROOM: A LOT
MOVING TO AND FROM BED TO CHAIR: A LITTLE
STANDING UP FROM CHAIR USING ARMS: A LITTLE
STANDING UP FROM CHAIR USING ARMS: A LITTLE
MOVING FROM LYING ON BACK TO SITTING ON SIDE OF FLAT BED WITH BEDRAILS: A LOT
DAILY ACTIVITIY SCORE: 20
EATING MEALS: A LITTLE
STANDING UP FROM CHAIR USING ARMS: A LOT
MOBILITY SCORE: 11
TURNING FROM BACK TO SIDE WHILE IN FLAT BAD: A LITTLE
MOVING TO AND FROM BED TO CHAIR: A LOT
DRESSING REGULAR LOWER BODY CLOTHING: A LITTLE

## 2025-03-22 ASSESSMENT — ENCOUNTER SYMPTOMS
CHILLS: 1
WEAKNESS: 1
COUGH: 1
FATIGUE: 1

## 2025-03-22 ASSESSMENT — ACTIVITIES OF DAILY LIVING (ADL)
HOME_MANAGEMENT_TIME_ENTRY: 8
ADL_ASSISTANCE: INDEPENDENT
ADL_ASSISTANCE: INDEPENDENT
BATHING_ASSISTANCE: MODERATE

## 2025-03-22 ASSESSMENT — PAIN - FUNCTIONAL ASSESSMENT
PAIN_FUNCTIONAL_ASSESSMENT: 0-10

## 2025-03-22 ASSESSMENT — PAIN SCALES - GENERAL
PAINLEVEL_OUTOF10: 0 - NO PAIN
PAINLEVEL_OUTOF10: 3
PAINLEVEL_OUTOF10: 0 - NO PAIN

## 2025-03-22 ASSESSMENT — PAIN DESCRIPTION - LOCATION: LOCATION: HEAD

## 2025-03-22 NOTE — PROGRESS NOTES
Physical Therapy    Physical Therapy Evaluation    Patient Name: Tana Hargrove  MRN: [id number]  Today's Date: 3/22/2025   Time Calculation  Start Time: 1051  Stop Time: 1118  Time Calculation (min): 27 min  2304/2304-A    Assessment/Plan   PT Assessment  PT Assessment Results: Decreased strength, Decreased endurance, Impaired balance, Decreased mobility, Decreased coordination, Decreased safety awareness  Rehab Prognosis: Good  Barriers to Discharge Home: Caregiver assistance, Physical needs  Caregiver Assistance: Patient lives alone and/or does not have reliable caregiver assistance  Physical Needs: 24hr mobility assistance needed, 24hr ADL assistance needed, High falls risk due to function or environment  Evaluation/Treatment Tolerance: Patient tolerated treatment well, Patient limited by fatigue  Medical Staff Made Aware: Yes  End of Session Communication: Bedside nurse    Assessment Comment: Patient presents with decreased strength, endurance, safety awareness, balance with hemoglobin 7.0. She required MOD/MIN A of 1 with SBA of 1 for functional mobility safety. Recommend continued PT with referral for MODERATE INTENSITY rehab.    End of Session Patient Position: Up in chair, Alarm on    IP OR SWING BED PT PLAN  Inpatient or Swing Bed: Inpatient     03/22/25 1051   PT Plan   Treatment/Interventions Bed mobility;Transfer training;Gait training;Balance training;Strengthening;Endurance training;Therapeutic exercise;Therapeutic activity;Home exercise program   PT Plan Ongoing PT   PT Frequency 4 times per week   PT Discharge Recommendations Moderate intensity level of continued care   Equipment Recommended upon Discharge   (Has rollator at home.)   PT Recommended Transfer Status Assist x2  (For safety.)   PT - OK to Discharge Yes  (To next level of care when medically cleared.)       Subjective     Current Problem:  1. Acute hypoxic respiratory failure (Multi)        2. Influenza A        3. Anemia, unspecified type         4. Elevated troponin          Patient Active Problem List   Diagnosis    Lumbago    A-fib (Multi)    Anxiety    Chronic diastolic heart failure of unknown etiology    Cervical spondylosis without myelopathy    Coronary artery disease    Degeneration of intervertebral disc of lumbar region    Hypertension, essential    Frequent falls    GERD (gastroesophageal reflux disease)    Hyperlipidemia    Inability to ambulate due to multiple joints    Jerking movements of extremities    Spinal stenosis of lumbar region    Lumbar spondylosis    Macrocytic anemia    Myelodysplastic syndrome (Multi)    Myofascial pain syndrome    Occasional tremors    Osteoporosis    Stage 4 chronic kidney disease (Multi)    Weakness generalized    Gout    Scoliosis of lumbar region due to degenerative disease of spine in adult    Depression    Lower extremity edema    Diabetes mellitus with complication (Multi)    COPD without exacerbation (Multi)    Primary osteoarthritis of right knee    Fall    Acute kidney failure, unspecified (CMS-[facility])    Muscle weakness (generalized)    Primary osteoarthritis    Repeated falls    Paroxysmal atrial fibrillation (Multi)    Chronic diastolic (congestive) heart failure    Chronic obstructive pulmonary disease, unspecified    CKD (chronic kidney disease)    Normocytic anemia    Essential (primary) hypertension    HLD (hyperlipidemia)    Type 2 diabetes mellitus with hyperglycemia, without long-term current use of insulin    Type 2 diabetes mellitus without complications (Multi)    Obesity (BMI 30-39.9)    Cellulitis of toe of right foot    Acute kidney injury (nontraumatic) (CMS-[facility])    ESRD (end stage renal disease) on dialysis (Multi)    Anemia due to chronic kidney disease, on chronic dialysis (Multi)    Thickening of wall of gallbladder    Allergy, unspecified, sequela    Anaphylactic shock, unspecified, sequela    Articular cartilage disorder of hip    Bone marrow disorder    Coagulation defect,  unspecified    Contact with and (suspected) exposure to covid-19    Fracture of bone of hip (Multi)    History of anemia    Hyperkalemia    Pain of lower extremity    Pain, unspecified    Plantar fasciitis    Pneumonia due to infectious organism    Chest pain    Acute pulmonary edema    Elevated bilirubin    Atrial fibrillation with rapid ventricular response (Multi)    Calculus of gallbladder with acute cholecystitis with obstruction    Generalized abdominal pain    Acute on chronic anemia    Hypokalemia    Generalized weakness    (HFpEF) heart failure with preserved ejection fraction    Acute cystitis without hematuria    Peripheral vascular disease, unspecified (CMS-HCC)    Acute hypoxic respiratory failure (Multi)       General Visit Information:  General  Reason for Referral: Acute hypoxic respiratory failure. Influenza A. Impaired mobility.  Referred By: Jose Miguel Armendariz, APRN-CNP  Past Medical History Relevant to Rehab: PMHx: atrial fibrillation not on anticoagulation therapy, chronic kidney disease on hemodialysis, myelodysplastic syndrome with iron deficiency anemia followed by Dr. Ulloa, hypertension, diabetes, arthritis, coronary artery disease, diastolic heart failure, COPD, paroxysmal atrial fibrillation (78 y/o female admitted with acute hypoxic respiratory failure. +Influenza A. Hemoglobin 7.0. Blood transfusion received 2 units PRBC 3/21/25.)  Family/Caregiver Present: No  Co-Treatment: OT  Co-Treatment Reason: To optimize safe functional mobility and conserve energy with consideration of current medical status.  Prior to Session Communication: Bedside nurse  Patient Position Received: Bed, 3 rail up, Alarm on  General Comment: Patient alert and initially disinterested in therapy due to IV running and L UE discomfort. With encouragement and education in assessments needed, pt agreeable to participate.    Home Living:  Home Living  Type of Home: Condo  Lives With: Alone  Home Adaptive Equipment:   (Rollator)  Home Layout: One level, Laundry main level  Home Access: Level entry  Bathroom Shower/Tub: Walk-in shower  Bathroom Equipment: Shower chair with back (Grab bar outside of shower.)    Prior Level of Function:  Prior Function Per Pt/Caregiver Report  Level of [place]: Independent with ADLs and functional transfers, Needs assistance with homemaking (Daughter assists with laundry.)  Receives Help From: Family, Friends (Friend drives her to dialysis MWF.)  ADL Assistance: Independent  Ambulatory Assistance: Independent (Rollator indoors; HHA outdoors.)  Prior Function Comments: (+) fall - slipped OOB. (-) drive. (+) lifealert    Precautions:  Precautions  Precautions Comment: Fall precautions. Hemglobin 7.0 - low activity tolerance. 1LO2.    Vital Signs:  Vital Signs  Vitals Session: During PT  Heart Rate: 70  SpO2: 93 %  Objective     Pain:  Pain Assessment  0-10 (Numeric) Pain Score: 0 - No pain    Cognition:  Cognition  Overall Cognitive Status: Within Functional Limits  Safety/Judgement: Exceptions to WFL (Not following cues for fully stepping back to chair or reaching back for grab bar next to toilet prior to sitting down.)  Insight: Mild  Impulsive: Mildly    General Assessments:  General Observation  General Observation: Patient provided with VC for hand placement and to fully back up to chair and toilet prior to stand > sit consistently. Facilitation and VC for WW management in small space of bathroom as well as in her room. TC and VC for upright posture (pt flexed, pushing WW ahead of her). Pt able to correct.   Activity Tolerance  Endurance: Decreased tolerance for upright activites, Tolerates less than 10 min exercise, no significant change in vital signs  Rate of Perceived Exertion (RPE): Modified TONYA RPE 10/10.  Sensation  Light Touch: No apparent deficits  Strength  Strength Comments: B LE grossly 4-/5.  Perception  Inattention/Neglect: Appears intact  Coordination  Movements are Fluid and  Coordinated: Yes     Static Sitting Balance  Static Sitting-Balance Support: Feet supported, Bilateral upper extremity supported  Static Sitting-Level of Assistance: Contact guard  Static Standing Balance  Static Standing-Balance Support: Bilateral upper extremity supported  Static Standing-Level of Assistance: Minimum assistance    Functional Assessments:     Bed Mobility  Bed Mobility:  (Supine > sit MOD A.)  Transfers  Transfer:  (Sit <> stand MIN A.)  Ambulation/Gait Training  Ambulation/Gait Training Performed:  (Ambulated 15 feet x 2 with WW And MIN A +1 additional person for equipment management and safety.)  Stairs  Stairs: No (Not required for home set up)       Extremity/Trunk Assessments:        RLE   RLE : Within Functional Limits  LLE   LLE : Within Functional Limits    Outcome Measures:     [facility] Basic Mobility  Turning from your back to your side while in a flat bed without using bedrails: A lot  Moving from lying on your back to sitting on the side of a flat bed without using bedrails: A lot  Moving to and from bed to chair (including a wheelchair): A lot  Standing up from a chair using your arms (e.g. wheelchair or bedside chair): A lot  To walk in hospital room: A lot  Climbing 3-5 steps with railing: Total  Basic Mobility - Total Score: 11                                                             Goals:  Encounter Problems       Encounter Problems (Active)       To improve strength, balance, safety awareness:        Patient will complete bed mobility with MIN A.        Start:  03/22/25    Expected End:  04/05/25            Patient will participate in B LE exercises in order to complete sit <> stand without VC for safe technique 100% time and CGA.       Start:  03/22/25    Expected End:  04/05/25            Patient will ambulate 45 feet x 2 with WW and CGA with upright posture and increased step length.        Start:  03/22/25    Expected End:  04/05/25                 Education  Documentation  Mobility Training, taught by Susie Puente, PT at 3/22/2025 12:42 PM.  Learner: Patient  Readiness: Acceptance  Method: Explanation  Response: Verbalizes Understanding, Needs Reinforcement    Education Comments  No comments found.

## 2025-03-22 NOTE — PROGRESS NOTES
Tana Hargrove is a 79 y.o. female on day 3 of admission presenting with Acute hypoxic respiratory failure (Multi).      Subjective     Tana Hargrove is a 79 y.o. female with PMHx s/f atrial fibrillation not on anticoagulation therapy, chronic kidney disease on hemodialysis with Dr. Avelar, myelodysplastic syndrome with iron deficiency anemia followed by Dr. Ulloa, hypertension, diabetes, arthritis, coronary artery disease, diastolic heart failure, COPD, paroxysmal atrial fibrillation not on anticoagulation therapy no Watchman device secondary to blood disorder who presented to the hospital complaining of generalized weakness over the last several days.  The patient reports that her hemoglobin was only 6 she did not have her last dialysis session.  She has been having generalized weakness she did have a fall landing down onto the ground without hitting her head.  Patient also has cough shortness of breath.  She is very chilled right now and shivering in the emergency department.  She is not having any abdominal pain nausea vomiting diarrhea no urinary symptoms.  She does have other family members who are having similar symptoms.  In the emergency department patient tested positive for influenza.  Her blood work showed hemoglobin of 6.7.  The patient was subsequently discussed with the ED provider the plan is to admit the patient to continue treatment for influenza and to obtain hematology consultation and consider transfusion for her anemia with hemoglobin of 6.7 she does have a chronically elevated ferritin level due to many transfusions.  Due to the comorbidities present with the blood dyscrasia and underlying COPD with superimposed influenza patient will likely need more than 2 midnights hospital stay for full evaluation and workup.        ED Course (Summary - please note all labs, imaging studies, and interventions noted below have been personally reviewed and/or interpreted on day of admission):   Vitals on  presentation: T 37.6 °C (99.7 °F)  HR 98  /73  RR (!) 22  O2 (!) 87 % None (Room air)  Labs:   CBC with WBC 4.3, Hgb 6.7, Plts 206.   CMP with glucose 179, Na 137, K 4.4, BUN 30, sCr 2.42, alk phos 87, ALT 16, AST 15, bilirubin 1.0. Magnesium 1.67.   . Trop 17.   Lactate 1.1  EKG: The EKG shows normal sinus rhythm no acute ST wave segment elevation to suggest ischemia no intraventricular conduction delay.  Imaging: Chest x-ray shows unchanged stable right hemidiaphragmatic elevation no acute pulmonary findings  Interventions: Patient was given nebulizer treatments and referred for admission          3.20.25 The patient's hemoglobin dropped to 5.4 she has since been transfused w 2 units, she remains weak, short of breath. Pt with fever most of the day and night yesterday, but none today.     3.21.25 pt with persistent weakness, hbg post 2 uinits RBC 7.5. Will add rocephin/doxycycline check procal level, cxr as patient is at risk for secondary bacterial pneumonia.     3.22.24 patient started on Rocephin and doxycycline yesterday no fevers last 24 hours.  She is extremely weak but does feel subjectively better.  Unfortunately patient has worsening pancytopenia.          Review of Systems   Constitutional:  Positive for chills and fatigue.   Respiratory:  Positive for cough.    Neurological:  Positive for weakness.          Objective     Last Recorded Vitals  /70 (BP Location: Right arm, Patient Position: Lying)   Pulse 71   Temp 36.9 °C (98.4 °F) (Temporal)   Resp 18   Wt 70 kg (154 lb 5.2 oz)   SpO2 92%     Image Results  ECG 12 lead    Result Date: 3/19/2025  Sinus rhythm Low voltage, precordial leads See ED provider note for full interpretation and clinical correlation Confirmed by Terri Bermudez ([id number]) on 3/19/2025 3:46:43 PM    XR chest 2 views    Result Date: 3/19/2025  Interpreted By:  Pauline Adame, STUDY: XR CHEST 2 VIEWS;  3/19/2025 9:16 am   INDICATION:  Signs/Symptoms:fever and cough.     COMPARISON: 09/20/2024   ACCESSION NUMBER(S): [id number]   ORDERING CLINICIAN: MERARY HERNANDEZ   FINDINGS: AP upright and lateral views were obtained with the patient sitting.   Right internal jugular dual lumen dialysis catheter tip projects in the right atrium.   CARDIOMEDIASTINAL SILHOUETTE: Cardiac silhouette is enlarged and appears larger than on the previous exam. This may be accentuated by the very shallow inspiration.   LUNGS: Right hemidiaphragm is elevated relative to the left hemidiaphragm, not significantly changed from the prior study. Overall the lung volumes are shallow. There is no focal consolidation noted. No pleural effusion is identified.   ABDOMEN: No remarkable upper abdominal findings.   BONES: No acute osseous changes.       1.  Shallow inspiration with no acute pulmonary finding 2. Unchanged elevation of the right hemidiaphragm 3. Cardiomegaly appears greater than on the prior study       MACRO: None   Signed by: Pauline Adame 3/19/2025 9:51 AM Dictation workstation:   [id number]       Lab Results  Results for orders placed or performed during the hospital encounter of 03/19/25 (from the past 24 hours)   POCT GLUCOSE   Result Value Ref Range    POCT Glucose 95 74 - 99 mg/dL   POCT GLUCOSE   Result Value Ref Range    POCT Glucose 125 (H) 74 - 99 mg/dL   Basic Metabolic Panel   Result Value Ref Range    Glucose 89 74 - 99 mg/dL    Sodium 139 136 - 145 mmol/L    Potassium 3.9 3.5 - 5.3 mmol/L    Chloride 98 98 - 107 mmol/L    Bicarbonate 28 21 - 32 mmol/L    Anion Gap 17 10 - 20 mmol/L    Urea Nitrogen 19 6 - 23 mg/dL    Creatinine 1.74 (H) 0.50 - 1.05 mg/dL    eGFR 29 (L) >60 mL/min/1.73m*2    Calcium 8.2 (L) 8.6 - 10.3 mg/dL   CBC   Result Value Ref Range    WBC 2.2 (L) 4.4 - 11.3 x10*3/uL    nRBC 0.0 0.0 - 0.0 /100 WBCs    RBC 2.18 (L) 4.00 - 5.20 x10*6/uL    Hemoglobin 7.0 (L) 12.0 - 16.0 g/dL    Hematocrit 21.0 (L) 36.0 - 46.0 %    MCV 96 80 - 100  fL    MCH 32.1 26.0 - 34.0 pg    MCHC 33.3 32.0 - 36.0 g/dL    RDW 21.3 (H) 11.5 - 14.5 %    Platelets 105 (L) 150 - 450 x10*3/uL   Procalcitonin   Result Value Ref Range    Procalcitonin 1.08 (H) <=0.07 ng/mL   POCT GLUCOSE   Result Value Ref Range    POCT Glucose 140 (H) 74 - 99 mg/dL     *Note: Due to a large number of results and/or encounters for the requested time period, some results have not been displayed. A complete set of results can be found in Results Review.        Medications  Scheduled medications:  allopurinol, 100 mg, oral, BID  amLODIPine, 10 mg, oral, Daily  atorvastatin, 10 mg, oral, Nightly  carvedilol, 25 mg, oral, BID  cefTRIAXone, 2 g, intravenous, q24h  cholecalciferol, 2,000 Units, oral, Daily  cyanocobalamin, 1,000 mcg, oral, Daily  doxycycline, 100 mg, intravenous, q12h  epoetin alejandra or biosimilar, 28,000 Units, subcutaneous, Once per day on Monday Wednesday Friday  guaiFENesin, 1,200 mg, oral, BID  insulin lispro, 0-10 Units, subcutaneous, TID AC  ipratropium-albuteroL, 3 mL, nebulization, TID  losartan, 50 mg, oral, Daily  oseltamivir, 30 mg, oral, Once per day on Monday Wednesday Friday  pantoprazole, 40 mg, oral, Daily before breakfast   Or  pantoprazole, 40 mg, intravenous, Daily before breakfast  [Held by provider] pioglitazone, 15 mg, oral, Daily  polyethylene glycol, 17 g, oral, Daily  pregabalin, 75 mg, oral, Daily      Continuous medications:     PRN medications:  PRN medications: acetaminophen, benzocaine-menthol, bisacodyl, dextrose, dextrose, glucagon, glucagon, ipratropium-albuteroL, melatonin, ondansetron ODT **OR** ondansetron, oxygen     Physical Exam  Vitals reviewed.   Constitutional:       General: She is not in acute distress.  HENT:      Head: Normocephalic and atraumatic.      Right Ear: External ear normal.      Left Ear: External ear normal.      Nose: Nose normal.      Mouth/Throat:      Mouth: Mucous membranes are moist.      Pharynx: Oropharynx is clear.    Eyes:      Extraocular Movements: Extraocular movements intact.      Conjunctiva/sclera: Conjunctivae normal.      Pupils: Pupils are equal, round, and reactive to light.   Cardiovascular:      Rate and Rhythm: Normal rate and regular rhythm.      Pulses: Normal pulses.      Heart sounds: Normal heart sounds.   Pulmonary:      Effort: Pulmonary effort is normal. No respiratory distress.      Breath sounds: Wheezing and rhonchi present. No rales.   Chest:      Chest wall: No tenderness.   Abdominal:      General: Bowel sounds are normal. There is no distension.      Palpations: Abdomen is soft. There is no mass.      Tenderness: There is no abdominal tenderness. There is no rebound.   Musculoskeletal:         General: No swelling or deformity. Normal range of motion.      Cervical back: Normal range of motion.   Skin:     General: Skin is warm and dry.      Capillary Refill: Capillary refill takes less than 2 seconds.   Neurological:      General: No focal deficit present.      Mental Status: She is alert and oriented to person, place, and time.      Comments: Slow movements, slow thought processessing   Psychiatric:         Mood and Affect: Mood normal.         Behavior: Behavior normal.                  Assessment/Plan      79 y.o. female with PMHx s/f atrial fibrillation not on anticoagulation therapy, chronic kidney disease on hemodialysis with Dr. Avelar, myelodysplastic syndrome with iron deficiency anemia followed by Dr. Ulloa, hypertension, diabetes, arthritis, coronary artery disease, diastolic heart failure, COPD, paroxysmal atrial fibrillation not on anticoagulation therapy no Watchman device secondary to blood disorder who presented to the hospital complaining of generalized weakness over the last several days.       Acute hypoxic respiratory failure secondary to history of COPD and superimposed influenza A  Patient is continued on Tamiflu renal dosing   We will continue the patient on nebulizer  treatments and supplemental oxygen  Mucinex and flutter valve to improve expectoration  Cough is very weak    Increased concern for secondary bacterial pneumonia  Patient is improving with antibiotics  Although CXR is not back, procal is pos  Will continue rocephin and doxycycline       Myelodysplastic syndrome with chronic iron deficiency anemia and pancytopenia  Ferritin level is elevated  Already received 2 units RBC on 3.20 but no Hbg 7.0  Transfuse if ok with hematology  GI prophylaxis w pantoprazole      Chronic kidney disease stage V on hemodialysis  Patient normally dialyzed on Tuesdays Thursdays and Saturday but missed dialysis yesterday  Dialyzed last night     Coronary artery disease, diastolic CHF, atrial fibrillation  Patient will be resumed on her normal cardiac medications including her carvedilol amlodipine  She is not complaining of any chest pain her heart rate is controlled  The patient is not on anticoagulation therapy and has not had a Watchman placed due to her severe blood dyscrasias and inability to tolerate blood thinners     Diabetes type 2  Placed on sliding scale insulin coverage    VTE prophylaxis  No lovenox /Heparin due to thrombocytopenia               Code Status: Full Code             Please see orders for more complete plan    Jose Miguel Armendariz, APRN-CNP

## 2025-03-22 NOTE — PROGRESS NOTES
[facility] Heart and Vascular Cardiology    Patient Name: Tana Hargrove  Patient : 1945    Scribe Attestation  By signing my name below, Krista NAVARRO, [place] attest that this documentation has been prepared under the direction and in the presence of Humphrey Callaway DO.    Physician Attestation  Humphrey NAVARRO DO, personally performed the services described in the documentation as scribed by Krista Pierce in my presence, and confirm it is both accurate and complete.    Reason for visit:  This is an 80-year-old female here for follow-up regarding paroxysmal atrial fibrillation not on anticoagulation secondary to MDS/anemia, end-stage renal disease followed by nephrology, HFpEF, hypertension, dyslipidemia, diabetes mellitus, and myelodysplastic syndrome/anemia.    HPI:  This is an 80-year-old female here for follow-up regarding paroxysmal atrial fibrillation not on anticoagulation secondary to MDS/anemia, end-stage renal disease followed by nephrology, HFpEF, hypertension, dyslipidemia, diabetes mellitus, and myelodysplastic syndrome/anemia.  The patient was last evaluated by me in 2024.  At that visit I ordered blood work including CMP/lipid/magnesium/CBC to be drawn in 6 months, and asked the patient to follow-up in 6 months and sooner if necessary.  The patient subsequently had a hospitalization a week in 2024 secondary to anemia, Hypokalemia, and UTI.  Patient was seen in the emergency department on several additional occasions including 2024, 2024, and 2024 all secondary to anemia.  CBC done 2025 showed a hemoglobin of 6.6 with platelet count of 189.  BMP done 2024 showed normal serum sodium and potassium with a serum creatinine of 2.37 consistent with ESTEE on CKD. Patient had a hospitalization on 3/19/2025 due to acute hypoxic respiratory failure, Influenza A, anemia and elevated troponin of 17-17. She was seen by Nephrology and underwent  hemodialysis. BMP done on 3/25/2025 showed serum sodium of 136, serum potassium was 3.5 and serum creatinine of 1.76 consistent with known CKD. CBC showed a hemoglobin of 7.5. ECG done today showed sinus rhythm with a heart rate of 60 bpm.  The patient reports that she has been feeling generally well from the cardiac standpoint. She denies any new chest pain, shortness of breath, palpitations and lightheadedness. She states that she takes all of her medications as prescribed. During my exam, she was resting comfortably on the exam table.            Assessment/Plan:   1. Paroxysmal atrial fibrillation  The patient has a history of paroxysmal atrial fibrillation not on anticoagulation secondary to MDS/anemia.  She should continue metoprolol tartrate for heart rate control.  ECG done today showed sinus rhythm with a heart rate of 60 bpm.     She denies chest pain, palpitations or lightheadedness.  Echocardiogram done in February 2024 showed normal left ventricular systolic function with an ejection fraction of 60%, grade 1 diastolic dysfunction, normal right ventricular systolic function, mild to moderate tricuspid valve regurgitation.   Recent lab works as noted in the HPI.   Lab works as noted below will be done in 6 months prior to his next visit.   Follow up in 6 months and sooner if necessary.      2. End-stage renal disease  Serum creatinine done in March 2025 was 1.76 consistent with known CKD.  Management as per Nephrology.     3. HFpEF  The patient has a history of HFpEF.  Echocardiogram done in February 2024 showed normal left ventricular systolic function with an ejection fraction of 60%, grade 1 diastolic dysfunction, normal right ventricular systolic function, mild to moderate tricuspid valve regurgitation.   She does not appear volume overloaded on exam today except for trace to 1+ pitting bilateral lower extremity edema.  She should continue current heart failure medications.  Recent lab works as noted  in the HPI.   Lab works as noted below will be done in 6 months prior to his next visit.   I discussed with her the importance of following a low-sodium heart healthy diet as well as weight loss, wearing compression stockings and elevating legs when seated.   Follow up in 6 months and sooner if necessary.      4. Hypertension  The patient has a history of hypertension and blood pressure appears controlled on exam today.   She should continue her current antihypertensive medications and monitor her blood pressure at home.      5. Dyslipidemia  Lipid panel done in May 2023  showed an LDL of 57 and triglycerides of 75, not currently on any lipid lowering medication.   Lipid panel was ordered as noted below.   Please see lifestyle recommendations below.      6. Diabetes mellitus  Hemoglobin A1c done in March 2024 was 6.3%.  Management as per PCP.     7. Myelodysplastic syndrome/anemia  CBC done 3/21/2025 showed a hemoglobin of 7.5.   Patient is being followed by Hematology-Oncology.         Orders:   Lipid panel   BMP/magnesium in 6 months,   Follow-up in 6 months.        Lifestyle Recommendations  I recommend a whole-food plant-based diet, an eating pattern that encourages the consumption of unrefined plant foods (such as fruits, vegetables, tubers, whole grains, legumes, nuts and seeds) and discourages meats, dairy products, eggs and processed foods.     The AHA/ACC recommends that the patient consume a dietary pattern that emphasizes intake of vegetables, fruits, and whole grains; includes low-fat dairy products, poultry, fish, legumes, non-tropical vegetable oils, and nuts; and limits intake of sodium, sweets, sugar-sweetened beverages, and red meats.  Adapt this dietary pattern to appropriate calorie requirements (a 500-750 kcal/day deficit to loose weight), personal and cultural food preferences, and nutrition therapy for other medical conditions (including diabetes).  Achieve this pattern by following plans such  as the [place] Mediterranean, DASH dietary pattern, or AHA diet.     Engage in 2 hours and 30 minutes per week of moderate-intensity physical activity, or 1 hour and 15 minutes (75 minutes) per week of vigorous-intensity aerobic physical activity, or an equivalent combination of moderate and vigorous-intensity aerobic physical activity. Aerobic activity should be performed in episodes of at least 10 minutes preferably spread throughout the week.     Adhering to a heart healthy diet, regular exercise habits, avoidance of tobacco products, and maintenance of a healthy weight are crucial components of their heart disease risk reduction.     Any positive review of systems not specifically addressed in the office visit today should be evaluated and treated by the patients primary care physician or in an emergency department if necessary     Patient was notified that results from ordered tests will be called to the patient if it changes current management; it will otherwise be discussed at a future appointment and available on  [facility].     Thank you for allowing me to participate in the care of this patient.        This document was generated using the assistance of voice recognition software. If there are any errors of spelling, grammar, syntax, or meaning; please feel free to contact me directly for clarification.    Past Medical History:  She has a past medical history of Abnormal levels of other serum enzymes, Acute kidney failure, Anemia, CKD (chronic kidney disease), COPD (chronic obstructive pulmonary disease) (Multi), Coronary artery disease, Disease of blood and blood-forming organs, unspecified, HLD (hyperlipidemia), Hypertension, MDS (myelodysplastic syndrome) (Multi), Personal history of other diseases of the musculoskeletal system and connective tissue, Personal history of other specified conditions, Personal history of other specified conditions, Type 2 diabetes mellitus, and Urinary tract infection, site  not specified (10/17/2024).    She has no past medical history of Adverse effect of anesthesia, Arthritis, Asthma, Awareness under anesthesia, CHF (congestive heart failure), Delayed emergence from general anesthesia, Disease of thyroid gland, Hard to intubate, History of transfusion, Malignant hyperthermia, PONV (postoperative nausea and vomiting), Pseudocholinesterase deficiency, Sickle cell anemia (Multi), Spinal headache, or Stroke (Multi).    Past Surgical History:  She has a past surgical history that includes Other surgical history (12/13/2019); Other surgical history (12/13/2019); Other surgical history (Bilateral, 12/13/2019); Other surgical history (Left, 12/13/2019); Other surgical history (12/13/2019); Other surgical history (12/13/2019); Other surgical history (Right, 12/13/2019); Other surgical history (04/16/2021); MR angio head wo IV contrast (11/20/2020); MR angio neck wo IV contrast (11/20/2020); Breast biopsy (Left); Hysterectomy; Appendectomy; Colonoscopy; Oophorectomy; Joint replacement; and Cholecystectomy (06/06/2024).      Social History:  She reports that she has never smoked. She has never been exposed to tobacco smoke. She has never used smokeless tobacco. She reports that she does not currently use alcohol. She reports that she does not use drugs.    Family History:  Family History   Problem Relation Name Age of Onset    Heart disease Mother      Heart attack Father      Hodgkin's lymphoma Son          Allergies:  Codeine, Hydrocodone, Hydrocodone-acetaminophen, Tramadol, Piperacillin-tazobactam, Adhesive tape-silicones, and Meperidine    Outpatient Medications:  Current Outpatient Medications   Medication Instructions    allopurinol (Zyloprim) 100 mg tablet 1 tablet, Every 12 hours scheduled (0630,1830)    amLODIPine (NORVASC) 10 mg, Daily    atorvastatin (LIPITOR) 10 mg, oral, Daily    carvedilol (COREG) 25 mg, oral, 2 times daily    cholecalciferol (Vitamin D-3) 50 MCG (2000 UT) tablet  1 tablet, Daily    cyanocobalamin (Vitamin B-12) 1,000 mcg tablet 1 tablet, Daily    losartan (COZAAR) 50 mg, oral, Daily    pioglitazone (ACTOS) 15 mg, oral, Daily    pregabalin (LYRICA) 100 mg, oral, 2 times daily        ROS:  A 14 point review of systems was done and is negative other than as stated in HPI    Vitals:      3/21/2025     9:25 AM 3/21/2025     2:02 PM 3/21/2025     2:35 PM 3/21/2025     5:39 PM 3/21/2025     6:57 PM 3/21/2025     9:07 PM 3/22/2025    12:50 AM   Vitals   Systolic 147 110   112 128 126   Diastolic 62 61   68 51 68   BP Location Right arm Right arm   Right arm Right arm Right arm   Heart Rate 111 89 102 71 76 76 70   Temp 38.3 °C (100.9 °F) 36.2 °C (97.1 °F) -- -- 36.4 °C (97.6 °F) 37.1 °C (98.8 °F) 36.7 °C (98 °F)   Resp 19 17   17 17 17        Physical Exam:   Constitutional: Cooperative, in no acute distress, alert, appears stated age.   Skin: Skin color, texture, turgor normal. No rashes or lesions.   Head: Normocephalic. No masses, lesions, tenderness or abnormalities   Eyes: Extraocular movements are grossly intact.   Mouth and throat: Mucous membranes moist   Neck: Neck supple, no carotid bruits, no JVD   Respiratory: Lungs clear to auscultation, no wheezing or rhonchi, no use of accessory muscles   Chest wall: No scars, normal excursion with respiration   Cardiovascular: Regular rhythm with + murmur  Gastrointestinal: Abdomen soft, nontender. Bowel sounds normal.   Musculoskeletal: Strength equal in upper extremities   Extremities: Trace to 1+ pitting edema   Neurologic: Sensation grossly intact, alert and oriented x3        Intake/Output:   No intake/output data recorded.    Outpatient Medications  Current Facility-Administered Medications on File Prior to Visit   Medication Dose Route Frequency Provider Last Rate Last Admin    acetaminophen (Tylenol) tablet 650 mg  650 mg oral q4h PRN GAB Esquivel-CNP   650 mg at 03/21/25 1042    allopurinol (Zyloprim) tablet 100  mg  100 mg oral BID GAB Esquivel-CNP   100 mg at 03/21/25 2108    amLODIPine (Norvasc) tablet 10 mg  10 mg oral Daily TIKA Esquivel   10 mg at 03/21/25 0838    atorvastatin (Lipitor) tablet 10 mg  10 mg oral Nightly GAB Esquivel-CNP   10 mg at 03/21/25 2108    benzocaine-menthol (Cepastat Sore Throat) lozenge 1 lozenge  1 lozenge Mouth/Throat q2h PRN GAB Esquivel-CNP   1 lozenge at 03/19/25 1256    bisacodyl (Dulcolax) EC tablet 10 mg  10 mg oral Daily PRN GAB Esquivel-CNP   10 mg at 03/19/25 1230    carvedilol (Coreg) tablet 25 mg  25 mg oral BID GAB Esquivel-CNP   25 mg at 03/21/25 2108    cefTRIAXone (Rocephin) 2 g in dextrose (iso) IV 50 mL  2 g intravenous q24h GAB Esquivel-CNP   Stopped at 03/21/25 1112    cholecalciferol (Vitamin D-3) tablet 2,000 Units  2,000 Units oral Daily GAB Esquivel-CNP   2,000 Units at 03/21/25 0838    cyanocobalamin (Vitamin B-12) tablet 1,000 mcg  1,000 mcg oral Daily GAB Esquivel-CNP   1,000 mcg at 03/21/25 0838    dextrose 50 % injection 12.5 g  12.5 g intravenous q15 min PRN GAB Esquivel-CNP        dextrose 50 % injection 25 g  25 g intravenous q15 min PRN Jose Miguel Armendariz APRN-NICOLÁS        doxycycline (Vibramycin) 100 mg in sodium chloride 0.9%  mL  100 mg intravenous q12h GAB Esquivel- mL/hr at 03/22/25 0028 100 mg at 03/22/25 0028    epoetin alejandra-epbx (Retacrit) injection 28,000 Units  28,000 Units subcutaneous Once per day on Monday Wednesday Friday Pinky Christie DO   28,000 Units at 03/21/25 1742    glucagon (Glucagen) injection 1 mg  1 mg intramuscular q15 min PRN TIKA Esquivel        glucagon (Glucagen) injection 1 mg  1 mg intramuscular q15 min PRN TIKA Esquivel        guaiFENesin (Mucinex) 12 hr tablet 1,200 mg  1,200 mg oral BID TIKA Esquivel   1,200 mg at 03/21/25 9920    insulin lispro  injection 0-10 Units  0-10 Units subcutaneous TID AC TIKA Esquivel   2 Units at 03/19/25 1705    ipratropium-albuteroL (Duo-Neb) 0.5-2.5 mg/3 mL nebulizer solution 3 mL  3 mL nebulization q6h PRN Warren Head MD        ipratropium-albuteroL (Duo-Neb) 0.5-2.5 mg/3 mL nebulizer solution 3 mL  3 mL nebulization TID Warren Head MD   3 mL at 03/21/25 1112    losartan (Cozaar) tablet 50 mg  50 mg oral Daily TIKA Esquievl   50 mg at 03/21/25 0838    melatonin tablet 3 mg  3 mg oral Nightly PRN TIKA Esquivel        ondansetron ODT (Zofran-ODT) disintegrating tablet 4 mg  4 mg oral q8h PRN TIKA Esquivel        Or    ondansetron (Zofran) injection 4 mg  4 mg intravenous q8h PRN TIKA Esquivel        oseltamivir (Tamiflu) capsule 30 mg  30 mg oral Once per day on Monday Wednesday Friday TIKA Esquivel   30 mg at 03/21/25 0838    oxygen (O2) therapy   inhalation Continuous PRN - O2/gases Saurabh Vo,  180,000 mL/hr at 03/20/25 2031 3 L/min at 03/20/25 2031    pantoprazole (ProtoNix) EC tablet 40 mg  40 mg oral Daily before breakfast TIKA Esquivel        Or    pantoprazole (ProtoNix) injection 40 mg  40 mg intravenous Daily before breakfast TIKA Esquivel   40 mg at 03/21/25 0535    pioglitazone (Actos) tablet 15 mg  15 mg oral Daily TIKA Esquivel   15 mg at 03/21/25 0838    polyethylene glycol (Glycolax, Miralax) packet 17 g  17 g oral Daily TIKA Esquivel        pregabalin (Lyrica) capsule 75 mg  75 mg oral Daily TIKA Esquivel   75 mg at 03/21/25 0838    [DISCONTINUED] ipratropium-albuteroL (Duo-Neb) 0.5-2.5 mg/3 mL nebulizer solution 3 mL  3 mL nebulization TID Warren Head MD   3 mL at 03/21/25 0724    [DISCONTINUED] ipratropium-albuteroL (Duo-Neb) 0.5-2.5 mg/3 mL nebulizer solution 3 mL  3 mL nebulization TID Warren Head MD         Current Outpatient  Medications on File Prior to Visit   Medication Sig Dispense Refill    allopurinol (Zyloprim) 100 mg tablet Take 1 tablet (100 mg) by mouth every 12 hours.      amLODIPine (Norvasc) 10 mg tablet Take 1 tablet (10 mg) by mouth once daily.      atorvastatin (Lipitor) 10 mg tablet TAKE ONE TABLET BY MOUTH ONCE DAILY 30 tablet 0    carvedilol (Coreg) 25 mg tablet Take 1 tablet (25 mg) by mouth 2 times a day. 60 tablet 3    cholecalciferol (Vitamin D-3) 50 MCG (2000 UT) tablet Take 1 tablet (2,000 Units) by mouth once daily.      cyanocobalamin (Vitamin B-12) 1,000 mcg tablet Take 1 tablet (1,000 mcg) by mouth once daily.      losartan (Cozaar) 50 mg tablet TAKE ONE TABLET BY MOUTH ONCE DAILY 30 tablet 0    pioglitazone (Actos) 15 mg tablet TAKE ONE TABLET BY MOUTH ONCE DAILY 30 tablet 0    pregabalin (Lyrica) 100 mg capsule Take 1 capsule (100 mg) by mouth 2 times a day. 60 capsule 0    [DISCONTINUED] fluticasone (Flonase) 50 mcg/actuation nasal spray Administer 1 spray into each nostril once daily.      [DISCONTINUED] sevelamer carbonate (Renvela) 800 mg tablet Take by mouth.         Labs: (past 26 weeks)  Recent Results (from the past 26 weeks)   Comprehensive Metabolic Panel    Collection Time: 09/21/24  4:35 AM   Result Value Ref Range    Glucose 102 (H) 74 - 99 mg/dL    Sodium 137 136 - 145 mmol/L    Potassium 3.8 3.5 - 5.3 mmol/L    Chloride 107 98 - 107 mmol/L    Bicarbonate 25 21 - 32 mmol/L    Anion Gap 9 (L) 10 - 20 mmol/L    Urea Nitrogen 46 (H) 6 - 23 mg/dL    Creatinine 2.60 (H) 0.50 - 1.05 mg/dL    eGFR 18 (L) >60 mL/min/1.73m*2    Calcium 9.2 8.6 - 10.3 mg/dL    Albumin 3.7 3.4 - 5.0 g/dL    Alkaline Phosphatase 104 33 - 136 U/L    Total Protein 5.8 (L) 6.4 - 8.2 g/dL    AST 12 9 - 39 U/L    Bilirubin, Total 1.0 0.0 - 1.2 mg/dL    ALT 14 7 - 45 U/L   CBC and Auto Differential    Collection Time: 09/21/24  4:35 AM   Result Value Ref Range    WBC 3.2 (L) 4.4 - 11.3 x10*3/uL    nRBC 0.0 0.0 - 0.0 /100 WBCs     RBC 2.34 (L) 4.00 - 5.20 x10*6/uL    Hemoglobin 7.4 (L) 12.0 - 16.0 g/dL    Hematocrit 22.2 (L) 36.0 - 46.0 %    MCV 95 80 - 100 fL    MCH 31.6 26.0 - 34.0 pg    MCHC 33.3 32.0 - 36.0 g/dL    RDW 22.2 (H) 11.5 - 14.5 %    Platelets 172 150 - 450 x10*3/uL    Neutrophils % 36.0 40.0 - 80.0 %    Immature Granulocytes %, Automated 0.3 0.0 - 0.9 %    Lymphocytes % 47.7 13.0 - 44.0 %    Monocytes % 10.8 2.0 - 10.0 %    Eosinophils % 4.6 0.0 - 6.0 %    Basophils % 0.6 0.0 - 2.0 %    Neutrophils Absolute 1.16 (L) 1.60 - 5.50 x10*3/uL    Immature Granulocytes Absolute, Automated 0.01 0.00 - 0.50 x10*3/uL    Lymphocytes Absolute 1.54 0.80 - 3.00 x10*3/uL    Monocytes Absolute 0.35 0.05 - 0.80 x10*3/uL    Eosinophils Absolute 0.15 0.00 - 0.40 x10*3/uL    Basophils Absolute 0.02 0.00 - 0.10 x10*3/uL   Morphology    Collection Time: 09/21/24  4:35 AM   Result Value Ref Range    RBC Morphology See Below     Hypochromia Mild     Ovalocytes Few     Stomatocytes Few    Iron and TIBC    Collection Time: 09/21/24  4:35 AM   Result Value Ref Range    Iron 169 (H) 35 - 150 ug/dL    UIBC <55 (L) 110 - 370 ug/dL    TIBC      % Saturation     Ferritin    Collection Time: 09/21/24  4:35 AM   Result Value Ref Range    Ferritin 3,724 (H) 8 - 150 ng/mL   Phosphorus    Collection Time: 09/21/24  4:35 AM   Result Value Ref Range    Phosphorus 4.3 2.5 - 4.9 mg/dL   POCT GLUCOSE    Collection Time: 09/21/24  6:32 AM   Result Value Ref Range    POCT Glucose 115 (H) 74 - 99 mg/dL   POCT GLUCOSE    Collection Time: 09/21/24 11:21 AM   Result Value Ref Range    POCT Glucose 173 (H) 74 - 99 mg/dL   POCT GLUCOSE    Collection Time: 09/21/24  3:06 PM   Result Value Ref Range    POCT Glucose 113 (H) 74 - 99 mg/dL   POCT GLUCOSE    Collection Time: 09/21/24  8:46 PM   Result Value Ref Range    POCT Glucose 167 (H) 74 - 99 mg/dL   CBC    Collection Time: 09/22/24  4:56 AM   Result Value Ref Range    WBC 2.8 (L) 4.4 - 11.3 x10*3/uL    nRBC 0.0 0.0 - 0.0  /100 WBCs    RBC 2.19 (L) 4.00 - 5.20 x10*6/uL    Hemoglobin 7.2 (L) 12.0 - 16.0 g/dL    Hematocrit 20.9 (L) 36.0 - 46.0 %    MCV 95 80 - 100 fL    MCH 32.9 26.0 - 34.0 pg    MCHC 34.4 32.0 - 36.0 g/dL    RDW 22.1 (H) 11.5 - 14.5 %    Platelets 156 150 - 450 x10*3/uL   Basic Metabolic Panel    Collection Time: 09/22/24  4:56 AM   Result Value Ref Range    Glucose 107 (H) 74 - 99 mg/dL    Sodium 136 136 - 145 mmol/L    Potassium 4.2 3.5 - 5.3 mmol/L    Chloride 106 98 - 107 mmol/L    Bicarbonate 26 21 - 32 mmol/L    Anion Gap 8 (L) 10 - 20 mmol/L    Urea Nitrogen 26 (H) 6 - 23 mg/dL    Creatinine 2.28 (H) 0.50 - 1.05 mg/dL    eGFR 21 (L) >60 mL/min/1.73m*2    Calcium 8.5 (L) 8.6 - 10.3 mg/dL   POCT GLUCOSE    Collection Time: 09/22/24  6:34 AM   Result Value Ref Range    POCT Glucose 108 (H) 74 - 99 mg/dL   POCT GLUCOSE    Collection Time: 09/22/24 11:29 AM   Result Value Ref Range    POCT Glucose 169 (H) 74 - 99 mg/dL   POCT GLUCOSE    Collection Time: 09/22/24  4:30 PM   Result Value Ref Range    POCT Glucose 112 (H) 74 - 99 mg/dL   POCT GLUCOSE    Collection Time: 09/22/24  8:39 PM   Result Value Ref Range    POCT Glucose 96 74 - 99 mg/dL   Basic Metabolic Panel    Collection Time: 09/23/24  3:44 AM   Result Value Ref Range    Glucose 109 (H) 74 - 99 mg/dL    Sodium 134 (L) 136 - 145 mmol/L    Potassium 3.7 3.5 - 5.3 mmol/L    Chloride 105 98 - 107 mmol/L    Bicarbonate 25 21 - 32 mmol/L    Anion Gap 8 (L) 10 - 20 mmol/L    Urea Nitrogen 36 (H) 6 - 23 mg/dL    Creatinine 3.10 (H) 0.50 - 1.05 mg/dL    eGFR 15 (L) >60 mL/min/1.73m*2    Calcium 8.5 (L) 8.6 - 10.3 mg/dL   CBC    Collection Time: 09/23/24  3:44 AM   Result Value Ref Range    WBC 3.1 (L) 4.4 - 11.3 x10*3/uL    nRBC 0.0 0.0 - 0.0 /100 WBCs    RBC 2.32 (L) 4.00 - 5.20 x10*6/uL    Hemoglobin 7.4 (L) 12.0 - 16.0 g/dL    Hematocrit 22.4 (L) 36.0 - 46.0 %    MCV 97 80 - 100 fL    MCH 31.9 26.0 - 34.0 pg    MCHC 33.0 32.0 - 36.0 g/dL    RDW 21.7 (H) 11.5 -  14.5 %    Platelets 163 150 - 450 x10*3/uL   POCT GLUCOSE    Collection Time: 09/23/24  6:37 AM   Result Value Ref Range    POCT Glucose 107 (H) 74 - 99 mg/dL   POCT GLUCOSE    Collection Time: 09/23/24 12:08 PM   Result Value Ref Range    POCT Glucose 105 (H) 74 - 99 mg/dL   POCT GLUCOSE    Collection Time: 09/23/24  2:45 PM   Result Value Ref Range    POCT Glucose 179 (H) 74 - 99 mg/dL   Urine Culture    Collection Time: 10/02/24 12:39 PM    Specimen: Clean Catch/Voided; Urine   Result Value Ref Range    Urine Culture 20,000 - 80,000 Escherichia coli (A)        Susceptibility    Escherichia coli - MICROSCAN     Ampicillin  Susceptible ug/ml     Cefazolin  Susceptible ug/ml     Cefazolin (uncomplicated UTIs only)  Susceptible ug/ml     Ciprofloxacin  Resistant ug/ml     Gentamicin  Susceptible ug/ml     Nitrofurantoin  Susceptible ug/ml     Piperacillin/Tazobactam  Susceptible ug/ml     Trimethoprim/Sulfamethoxazole  Susceptible ug/ml   Urinalysis with Reflex Culture and Microscopic    Collection Time: 10/02/24 12:39 PM   Result Value Ref Range    Color, Urine Yellow Light-Yellow, Yellow, Dark-Yellow    Appearance, Urine Turbid (N) Clear    Specific Gravity, Urine 1.017 1.005 - 1.035    pH, Urine 7.5 5.0, 5.5, 6.0, 6.5, 7.0, 7.5, 8.0    Protein, Urine 300 (3+) (A) NEGATIVE, 10 (TRACE), 20 (TRACE) mg/dL    Glucose, Urine 100 (1+) (A) Normal mg/dL    Blood, Urine NEGATIVE NEGATIVE    Ketones, Urine NEGATIVE NEGATIVE mg/dL    Bilirubin, Urine NEGATIVE NEGATIVE    Urobilinogen, Urine Normal Normal mg/dL    Nitrite, Urine NEGATIVE NEGATIVE    Leukocyte Esterase, Urine 250 Maria Elena/µL (A) NEGATIVE   Extra Urine Gray Tube    Collection Time: 10/02/24 12:39 PM   Result Value Ref Range    Extra Tube Hold for add-ons.    Microscopic Only, Urine    Collection Time: 10/02/24 12:39 PM   Result Value Ref Range    WBC, Urine 21-50 (A) 1-5, NONE /HPF    RBC, Urine 3-5 NONE, 1-2, 3-5 /HPF    Squamous Epithelial Cells, Urine 10-25 (FEW)  Reference range not established. /HPF    Bacteria, Urine 4+ (A) NONE SEEN /HPF    Mucus, Urine FEW Reference range not established. /LPF    Hyaline Casts, Urine 2+ (A) NONE /LPF   Urine Culture    Collection Time: 10/02/24 12:39 PM    Specimen: Clean Catch/Voided; Urine   Result Value Ref Range    Urine Culture No significant growth    POCT UA (nonautomated) manually resulted    Collection Time: 10/08/24  1:30 PM   Result Value Ref Range    POC Color, Urine Straw Straw, Yellow, Light-Yellow    POC Appearance, Urine Cloudy (A) Clear    POC Glucose, Urine NEGATIVE NEGATIVE mg/dl    POC Bilirubin, Urine SMALL (1+) (A) NEGATIVE    POC Ketones, Urine NEGATIVE NEGATIVE mg/dl    POC Specific Gravity, Urine 1.010 1.005 - 1.035    POC Blood, Urine TRACE-Intact (A) NEGATIVE    POC PH, Urine 8.5 No Reference Range Established PH    POC Protein, Urine 300 (3+) (A) NEGATIVE, 30 (1+) mg/dl    POC Urobilinogen, Urine 0.2 0.2, 1.0 EU/DL    Poc Nitrite, Urine NEGATIVE NEGATIVE    POC Leukocytes, Urine LARGE (3+) (A) NEGATIVE   Urine Culture    Collection Time: 10/08/24  1:34 PM    Specimen: Clean Catch/Voided; Urine   Result Value Ref Range    Urine Culture Normal genitourinary [place]    CBC and Auto Differential    Collection Time: 10/08/24  4:29 PM   Result Value Ref Range    WBC 4.6 4.4 - 11.3 x10*3/uL    nRBC 0.0 0.0 - 0.0 /100 WBCs    RBC 2.07 (L) 4.00 - 5.20 x10*6/uL    Hemoglobin 6.6 (L) 12.0 - 16.0 g/dL    Hematocrit 19.6 (L) 36.0 - 46.0 %    MCV 95 80 - 100 fL    MCH 31.9 26.0 - 34.0 pg    MCHC 33.7 32.0 - 36.0 g/dL    RDW 20.8 (H) 11.5 - 14.5 %    Platelets 232 150 - 450 x10*3/uL    Neutrophils % 59.8 40.0 - 80.0 %    Immature Granulocytes %, Automated 0.4 0.0 - 0.9 %    Lymphocytes % 25.7 13.0 - 44.0 %    Monocytes % 9.9 2.0 - 10.0 %    Eosinophils % 3.5 0.0 - 6.0 %    Basophils % 0.7 0.0 - 2.0 %    Neutrophils Absolute 2.72 1.60 - 5.50 x10*3/uL    Immature Granulocytes Absolute, Automated 0.02 0.00 - 0.50 x10*3/uL     Lymphocytes Absolute 1.17 0.80 - 3.00 x10*3/uL    Monocytes Absolute 0.45 0.05 - 0.80 x10*3/uL    Eosinophils Absolute 0.16 0.00 - 0.40 x10*3/uL    Basophils Absolute 0.03 0.00 - 0.10 x10*3/uL   Comprehensive metabolic panel    Collection Time: 10/08/24  4:29 PM   Result Value Ref Range    Glucose 136 (H) 74 - 99 mg/dL    Sodium 138 136 - 145 mmol/L    Potassium 3.4 (L) 3.5 - 5.3 mmol/L    Chloride 99 98 - 107 mmol/L    Bicarbonate 32 21 - 32 mmol/L    Anion Gap 10 10 - 20 mmol/L    Urea Nitrogen 21 6 - 23 mg/dL    Creatinine 2.04 (H) 0.50 - 1.05 mg/dL    eGFR 24 (L) >60 mL/min/1.73m*2    Calcium 9.3 8.6 - 10.3 mg/dL    Albumin 4.1 3.4 - 5.0 g/dL    Alkaline Phosphatase 185 (H) 33 - 136 U/L    Total Protein 6.4 6.4 - 8.2 g/dL    AST 27 9 - 39 U/L    Bilirubin, Total 0.9 0.0 - 1.2 mg/dL    ALT 36 7 - 45 U/L   Lactate    Collection Time: 10/08/24  4:29 PM   Result Value Ref Range    Lactate 0.9 0.4 - 2.0 mmol/L   Type And Screen    Collection Time: 10/08/24  4:29 PM   Result Value Ref Range    ABO TYPE A     Rh TYPE POS     ANTIBODY SCREEN NEG    Morphology    Collection Time: 10/08/24  4:29 PM   Result Value Ref Range    RBC Morphology See Below     Polychromasia Mild     Ovalocytes Few     Teardrop Cells Few    Sars-CoV-2 PCR    Collection Time: 10/08/24  4:37 PM   Result Value Ref Range    Coronavirus 2019, PCR Not Detected Not Detected   ECG 12 lead    Collection Time: 10/08/24  4:38 PM   Result Value Ref Range    Ventricular Rate 87 BPM    Atrial Rate 87 BPM    MI Interval 180 ms    QRS Duration 94 ms    QT Interval 404 ms    QTC Calculation(Bazett) 486 ms    P Axis 65 degrees    R Axis 15 degrees    T Axis 43 degrees    QRS Count 14 beats    Q Onset 249 ms    T Offset 451 ms    QTC Fredericia 457 ms   Prepare RBC: 1 Units    Collection Time: 10/08/24  6:26 PM   Result Value Ref Range    PRODUCT CODE [id number]     Unit Number [id number]     Unit ABO A     Unit RH POS     XM INTEP COMP     Dispense Status RE      Blood Expiration Date 10/23/2024 11:59:00 PM EDT     PRODUCT BLOOD TYPE 6200     UNIT VOLUME 350    Prepare RBC    Collection Time: 10/08/24  6:26 PM   Result Value Ref Range    PRODUCT CODE E2878W09     Unit Number [id number]     Unit ABO A     Unit RH NEG     XM INTEP COMP     Dispense Status TR     Blood Expiration Date 10/24/2024 11:59:00 PM EDT     PRODUCT BLOOD TYPE 0600     UNIT VOLUME 281    CBC    Collection Time: 10/09/24  5:10 AM   Result Value Ref Range    WBC 3.2 (L) 4.4 - 11.3 x10*3/uL    nRBC 0.0 0.0 - 0.0 /100 WBCs    RBC 1.90 (L) 4.00 - 5.20 x10*6/uL    Hemoglobin 6.0 (LL) 12.0 - 16.0 g/dL    Hematocrit 17.9 (L) 36.0 - 46.0 %    MCV 94 80 - 100 fL    MCH 31.6 26.0 - 34.0 pg    MCHC 33.5 32.0 - 36.0 g/dL    RDW 19.7 (H) 11.5 - 14.5 %    Platelets 168 150 - 450 x10*3/uL   Comprehensive metabolic panel    Collection Time: 10/09/24  5:10 AM   Result Value Ref Range    Glucose 106 (H) 74 - 99 mg/dL    Sodium 139 136 - 145 mmol/L    Potassium 3.6 3.5 - 5.3 mmol/L    Chloride 101 98 - 107 mmol/L    Bicarbonate 30 21 - 32 mmol/L    Anion Gap 12 10 - 20 mmol/L    Urea Nitrogen 23 6 - 23 mg/dL    Creatinine 2.06 (H) 0.50 - 1.05 mg/dL    eGFR 24 (L) >60 mL/min/1.73m*2    Calcium 8.9 8.6 - 10.3 mg/dL    Albumin 3.3 (L) 3.4 - 5.0 g/dL    Alkaline Phosphatase 129 33 - 136 U/L    Total Protein 5.2 (L) 6.4 - 8.2 g/dL    AST 17 9 - 39 U/L    Bilirubin, Total 0.7 0.0 - 1.2 mg/dL    ALT 24 7 - 45 U/L   Urinalysis with Reflex Culture and Microscopic    Collection Time: 10/09/24  5:19 AM   Result Value Ref Range    Color, Urine Light-Yellow Light-Yellow, Yellow, Dark-Yellow    Appearance, Urine Clear Clear    Specific Gravity, Urine 1.016 1.005 - 1.035    pH, Urine 7.5 5.0, 5.5, 6.0, 6.5, 7.0, 7.5, 8.0    Protein, Urine 300 (3+) (A) NEGATIVE, 10 (TRACE), 20 (TRACE) mg/dL    Glucose, Urine Normal Normal mg/dL    Blood, Urine NEGATIVE NEGATIVE    Ketones, Urine NEGATIVE NEGATIVE mg/dL    Bilirubin, Urine NEGATIVE  NEGATIVE    Urobilinogen, Urine Normal Normal mg/dL    Nitrite, Urine NEGATIVE NEGATIVE    Leukocyte Esterase, Urine 75 Maria Elena/µL (A) NEGATIVE   Microscopic Only, Urine    Collection Time: 10/09/24  5:19 AM   Result Value Ref Range    WBC, Urine 1-5 1-5, NONE /HPF    RBC, Urine 1-2 NONE, 1-2, 3-5 /HPF    Squamous Epithelial Cells, Urine 1-9 (SPARSE) Reference range not established. /HPF   Urine Culture    Collection Time: 10/09/24  5:19 AM    Specimen: Clean Catch/Voided; Urine   Result Value Ref Range    Urine Culture No significant growth    POCT GLUCOSE    Collection Time: 10/09/24  6:27 AM   Result Value Ref Range    POCT Glucose 103 (H) 74 - 99 mg/dL   Prepare RBC: 2 Units    Collection Time: 10/09/24  7:01 AM   Result Value Ref Range    PRODUCT CODE [id number]     Unit Number [id number]     Unit ABO A     Unit RH POS     XM INTEP COMP     Dispense Status TR     Blood Expiration Date 11/4/2024 11:59:00 PM EST     PRODUCT BLOOD TYPE 6200     UNIT VOLUME 350     PRODUCT CODE J6156F98     Unit Number [id number]     Unit ABO A     Unit RH POS     XM INTEP COMP     Dispense Status TR     Blood Expiration Date 11/4/2024 11:59:00 PM EST     PRODUCT BLOOD TYPE 6200     UNIT VOLUME 350    POCT GLUCOSE    Collection Time: 10/09/24 12:46 PM   Result Value Ref Range    POCT Glucose 105 (H) 74 - 99 mg/dL   POCT GLUCOSE    Collection Time: 10/09/24  4:04 PM   Result Value Ref Range    POCT Glucose 135 (H) 74 - 99 mg/dL   POCT GLUCOSE    Collection Time: 10/09/24  9:12 PM   Result Value Ref Range    POCT Glucose 179 (H) 74 - 99 mg/dL   CBC    Collection Time: 10/10/24  4:56 AM   Result Value Ref Range    WBC 2.7 (L) 4.4 - 11.3 x10*3/uL    nRBC 0.0 0.0 - 0.0 /100 WBCs    RBC 2.87 (L) 4.00 - 5.20 x10*6/uL    Hemoglobin 9.2 (L) 12.0 - 16.0 g/dL    Hematocrit 26.4 (L) 36.0 - 46.0 %    MCV 92 80 - 100 fL    MCH 32.1 26.0 - 34.0 pg    MCHC 34.8 32.0 - 36.0 g/dL    RDW 17.4 (H) 11.5 - 14.5 %    Platelets 168 150 - 450 x10*3/uL    Basic Metabolic Panel    Collection Time: 10/10/24  4:56 AM   Result Value Ref Range    Glucose 112 (H) 74 - 99 mg/dL    Sodium 136 136 - 145 mmol/L    Potassium 3.5 3.5 - 5.3 mmol/L    Chloride 100 98 - 107 mmol/L    Bicarbonate 30 21 - 32 mmol/L    Anion Gap 10 10 - 20 mmol/L    Urea Nitrogen 21 6 - 23 mg/dL    Creatinine 1.44 (H) 0.50 - 1.05 mg/dL    eGFR 37 (L) >60 mL/min/1.73m*2    Calcium 8.9 8.6 - 10.3 mg/dL   POCT GLUCOSE    Collection Time: 10/10/24  6:49 AM   Result Value Ref Range    POCT Glucose 115 (H) 74 - 99 mg/dL   POCT GLUCOSE    Collection Time: 10/10/24 11:32 AM   Result Value Ref Range    POCT Glucose 143 (H) 74 - 99 mg/dL   POCT GLUCOSE    Collection Time: 10/10/24  4:18 PM   Result Value Ref Range    POCT Glucose 123 (H) 74 - 99 mg/dL   POCT GLUCOSE    Collection Time: 10/10/24  8:49 PM   Result Value Ref Range    POCT Glucose 214 (H) 74 - 99 mg/dL   CBC    Collection Time: 10/11/24  4:14 AM   Result Value Ref Range    WBC 2.9 (L) 4.4 - 11.3 x10*3/uL    nRBC 0.0 0.0 - 0.0 /100 WBCs    RBC 2.83 (L) 4.00 - 5.20 x10*6/uL    Hemoglobin 9.0 (L) 12.0 - 16.0 g/dL    Hematocrit 26.2 (L) 36.0 - 46.0 %    MCV 93 80 - 100 fL    MCH 31.8 26.0 - 34.0 pg    MCHC 34.4 32.0 - 36.0 g/dL    RDW 17.2 (H) 11.5 - 14.5 %    Platelets 163 150 - 450 x10*3/uL   Renal Function Panel    Collection Time: 10/11/24  4:14 AM   Result Value Ref Range    Glucose 118 (H) 74 - 99 mg/dL    Sodium 136 136 - 145 mmol/L    Potassium 3.4 (L) 3.5 - 5.3 mmol/L    Chloride 100 98 - 107 mmol/L    Bicarbonate 28 21 - 32 mmol/L    Anion Gap 11 10 - 20 mmol/L    Urea Nitrogen 26 (H) 6 - 23 mg/dL    Creatinine 1.95 (H) 0.50 - 1.05 mg/dL    eGFR 26 (L) >60 mL/min/1.73m*2    Calcium 9.0 8.6 - 10.3 mg/dL    Phosphorus 3.8 2.5 - 4.9 mg/dL    Albumin 3.2 (L) 3.4 - 5.0 g/dL   POCT GLUCOSE    Collection Time: 10/11/24  6:50 AM   Result Value Ref Range    POCT Glucose 131 (H) 74 - 99 mg/dL   POCT GLUCOSE    Collection Time: 10/11/24 11:24 AM    Result Value Ref Range    POCT Glucose 187 (H) 74 - 99 mg/dL   POCT GLUCOSE    Collection Time: 10/11/24  5:27 PM   Result Value Ref Range    POCT Glucose 129 (H) 74 - 99 mg/dL   POCT GLUCOSE    Collection Time: 10/11/24  9:35 PM   Result Value Ref Range    POCT Glucose 253 (H) 74 - 99 mg/dL   Magnesium    Collection Time: 10/12/24  6:03 AM   Result Value Ref Range    Magnesium 1.67 1.60 - 2.40 mg/dL   CBC    Collection Time: 10/12/24  6:03 AM   Result Value Ref Range    WBC 3.2 (L) 4.4 - 11.3 x10*3/uL    nRBC 0.0 0.0 - 0.0 /100 WBCs    RBC 2.84 (L) 4.00 - 5.20 x10*6/uL    Hemoglobin 9.1 (L) 12.0 - 16.0 g/dL    Hematocrit 27.8 (L) 36.0 - 46.0 %    MCV 98 80 - 100 fL    MCH 32.0 26.0 - 34.0 pg    MCHC 32.7 32.0 - 36.0 g/dL    RDW 17.5 (H) 11.5 - 14.5 %    Platelets 144 (L) 150 - 450 x10*3/uL   Basic Metabolic Panel    Collection Time: 10/12/24  6:03 AM   Result Value Ref Range    Glucose 122 (H) 74 - 99 mg/dL    Sodium 135 (L) 136 - 145 mmol/L    Potassium 3.8 3.5 - 5.3 mmol/L    Chloride 102 98 - 107 mmol/L    Bicarbonate 26 21 - 32 mmol/L    Anion Gap 11 10 - 20 mmol/L    Urea Nitrogen 20 6 - 23 mg/dL    Creatinine 1.85 (H) 0.50 - 1.05 mg/dL    eGFR 27 (L) >60 mL/min/1.73m*2    Calcium 9.0 8.6 - 10.3 mg/dL   POCT GLUCOSE    Collection Time: 10/12/24  6:28 AM   Result Value Ref Range    POCT Glucose 126 (H) 74 - 99 mg/dL   POCT GLUCOSE    Collection Time: 10/12/24 12:30 PM   Result Value Ref Range    POCT Glucose 153 (H) 74 - 99 mg/dL   Type And Screen    Collection Time: 11/07/24 10:26 AM   Result Value Ref Range    ABO TYPE A     Rh TYPE POS     ANTIBODY SCREEN NEG    CBC and Auto Differential    Collection Time: 11/07/24 10:26 AM   Result Value Ref Range    WBC 2.8 (L) 4.4 - 11.3 x10*3/uL    nRBC 0.0 0.0 - 0.0 /100 WBCs    RBC 2.05 (L) 4.00 - 5.20 x10*6/uL    Hemoglobin 6.7 (L) 12.0 - 16.0 g/dL    Hematocrit 20.5 (L) 36.0 - 46.0 %     80 - 100 fL    MCH 32.7 26.0 - 34.0 pg    MCHC 32.7 32.0 - 36.0 g/dL     RDW 19.4 (H) 11.5 - 14.5 %    Platelets 237 150 - 450 x10*3/uL    Neutrophils % 63.6 40.0 - 80.0 %    Immature Granulocytes %, Automated 0.4 0.0 - 0.9 %    Lymphocytes % 23.0 13.0 - 44.0 %    Monocytes % 10.2 2.0 - 10.0 %    Eosinophils % 2.1 0.0 - 6.0 %    Basophils % 0.7 0.0 - 2.0 %    Neutrophils Absolute 1.80 1.60 - 5.50 x10*3/uL    Immature Granulocytes Absolute, Automated 0.01 0.00 - 0.50 x10*3/uL    Lymphocytes Absolute 0.65 (L) 0.80 - 3.00 x10*3/uL    Monocytes Absolute 0.29 0.05 - 0.80 x10*3/uL    Eosinophils Absolute 0.06 0.00 - 0.40 x10*3/uL    Basophils Absolute 0.02 0.00 - 0.10 x10*3/uL   Morphology    Collection Time: 11/07/24 10:26 AM   Result Value Ref Range    RBC Morphology See Below     Polychromasia Mild     Hypochromia Mild     Ovalocytes Few     Basophilic Stippling Present    Drug Screen, Urine With Reflex to Confirmation    Collection Time: 11/07/24 10:35 AM   Result Value Ref Range    Amphetamine Screen, Urine Presumptive Negative Presumptive Negative    Barbiturate Screen, Urine Presumptive Negative Presumptive Negative    Benzodiazepines Screen, Urine Presumptive Negative Presumptive Negative    Cannabinoid Screen, Urine Presumptive Negative Presumptive Negative    Cocaine Metabolite Screen, Urine Presumptive Negative Presumptive Negative    Fentanyl Screen, Urine Presumptive Negative Presumptive Negative    Opiate Screen, Urine Presumptive Negative Presumptive Negative    Oxycodone Screen, Urine Presumptive Negative Presumptive Negative    PCP Screen, Urine Presumptive Negative Presumptive Negative    Methadone Screen, Urine Presumptive Negative Presumptive Negative   Prepare RBC: 1 Units    Collection Time: 11/07/24  4:22 PM   Result Value Ref Range    PRODUCT CODE [id number]     Unit Number [id number]     Unit ABO O     Unit RH NEG     XM INTEP COMP     Dispense Status TR     Blood Expiration Date 11/14/2024 11:59:00 PM EST     PRODUCT BLOOD TYPE 9500     UNIT VOLUME 350    Type  and screen    Collection Time: 11/07/24  4:25 PM   Result Value Ref Range    ABO TYPE A     Rh TYPE POS     ANTIBODY SCREEN NEG    Hemoglobin and hematocrit, blood    Collection Time: 11/07/24  8:50 PM   Result Value Ref Range    Hemoglobin 7.0 (L) 12.0 - 16.0 g/dL    Hematocrit 21.2 (L) 36.0 - 46.0 %   Prepare RBC: 1 Units    Collection Time: 11/07/24  9:24 PM   Result Value Ref Range    PRODUCT CODE C6008Q43     Unit Number [id number]     Unit ABO O     Unit RH NEG     XM INTEP COMP     Dispense Status TR     Blood Expiration Date 11/20/2024 11:59:00 PM EST     PRODUCT BLOOD TYPE 9500     UNIT VOLUME 350    CBC    Collection Time: 11/13/24 11:47 AM   Result Value Ref Range    WBC 3.2 (L) 4.4 - 11.3 x10*3/uL    nRBC 0.0 0.0 - 0.0 /100 WBCs    RBC 2.53 (L) 4.00 - 5.20 x10*6/uL    Hemoglobin 8.0 (L) 12.0 - 16.0 g/dL    Hematocrit 25.3 (L) 36.0 - 46.0 %     80 - 100 fL    MCH 31.6 26.0 - 34.0 pg    MCHC 31.6 (L) 32.0 - 36.0 g/dL    RDW 17.8 (H) 11.5 - 14.5 %    Platelets 220 150 - 450 x10*3/uL   CBC    Collection Time: 11/29/24 12:15 PM   Result Value Ref Range    WBC 4.5 4.4 - 11.3 x10*3/uL    nRBC 0.4 (H) 0.0 - 0.0 /100 WBCs    RBC 1.87 (L) 4.00 - 5.20 x10*6/uL    Hemoglobin 6.1 (LL) 12.0 - 16.0 g/dL    Hematocrit 19.2 (L) 36.0 - 46.0 %     (H) 80 - 100 fL    MCH 32.6 26.0 - 34.0 pg    MCHC 31.8 (L) 32.0 - 36.0 g/dL    RDW 21.2 (H) 11.5 - 14.5 %    Platelets 227 150 - 450 x10*3/uL   Prepare RBC: 1 Units    Collection Time: 11/29/24  5:21 PM   Result Value Ref Range    PRODUCT CODE [id number]     Unit Number [id number]     Unit ABO A     Unit RH NEG     XM INTEP COMP     Dispense Status RE     Blood Expiration Date 12/12/2024 11:59:00 PM EST     PRODUCT BLOOD TYPE 0600     UNIT VOLUME 350    Type and screen    Collection Time: 11/29/24  5:57 PM   Result Value Ref Range    ABO TYPE A     Rh TYPE POS     ANTIBODY SCREEN NEG    CBC and Auto Differential    Collection Time: 11/29/24  5:57 PM    Result Value Ref Range    WBC 6.0 4.4 - 11.3 x10*3/uL    nRBC 0.5 (H) 0.0 - 0.0 /100 WBCs    RBC 1.96 (L) 4.00 - 5.20 x10*6/uL    Hemoglobin 6.5 (LL) 12.0 - 16.0 g/dL    Hematocrit 20.1 (L) 36.0 - 46.0 %     (H) 80 - 100 fL    MCH 33.2 26.0 - 34.0 pg    MCHC 32.3 32.0 - 36.0 g/dL    RDW 21.3 (H) 11.5 - 14.5 %    Platelets 230 150 - 450 x10*3/uL    Neutrophils % 65.5 40.0 - 80.0 %    Immature Granulocytes %, Automated 0.3 0.0 - 0.9 %    Lymphocytes % 21.5 13.0 - 44.0 %    Monocytes % 8.7 2.0 - 10.0 %    Eosinophils % 3.7 0.0 - 6.0 %    Basophils % 0.3 0.0 - 2.0 %    Neutrophils Absolute 3.89 1.60 - 5.50 x10*3/uL    Immature Granulocytes Absolute, Automated 0.02 0.00 - 0.50 x10*3/uL    Lymphocytes Absolute 1.28 0.80 - 3.00 x10*3/uL    Monocytes Absolute 0.52 0.05 - 0.80 x10*3/uL    Eosinophils Absolute 0.22 0.00 - 0.40 x10*3/uL    Basophils Absolute 0.02 0.00 - 0.10 x10*3/uL   Basic metabolic panel    Collection Time: 11/29/24  5:57 PM   Result Value Ref Range    Glucose 121 (H) 74 - 99 mg/dL    Sodium 138 136 - 145 mmol/L    Potassium 3.5 3.5 - 5.3 mmol/L    Chloride 98 98 - 107 mmol/L    Bicarbonate 34 (H) 21 - 32 mmol/L    Anion Gap 10 10 - 20 mmol/L    Urea Nitrogen 11 6 - 23 mg/dL    Creatinine 1.39 (H) 0.50 - 1.05 mg/dL    eGFR 38 (L) >60 mL/min/1.73m*2    Calcium 8.3 (L) 8.6 - 10.3 mg/dL   Coagulation Screen    Collection Time: 11/29/24  5:57 PM   Result Value Ref Range    Protime 12.1 9.8 - 12.8 seconds    INR 1.1 0.9 - 1.1    aPTT 29 27 - 38 seconds   Morphology    Collection Time: 11/29/24  5:57 PM   Result Value Ref Range    RBC Morphology See Below     Polychromasia Mild     Hypochromia Mild     Target Cells Few     Ovalocytes Few     Kathya Cells Few     Basophilic Stippling Present    Prepare RBC    Collection Time: 11/29/24  6:38 PM   Result Value Ref Range    PRODUCT CODE [id number]     Unit Number [id number]     Unit ABO O     Unit RH POS     XM INTEP COMP     Dispense Status TR     Blood  Expiration Date 12/5/2024 11:59:00 PM EST     PRODUCT BLOOD TYPE 5100     UNIT VOLUME 350    Folate    Collection Time: 12/13/24 12:10 PM   Result Value Ref Range    Folate, Serum 16.9 >5.0 ng/mL   Vitamin B12    Collection Time: 12/13/24 12:10 PM   Result Value Ref Range    Vitamin B12 611 211 - 911 pg/mL   Iron and TIBC    Collection Time: 12/13/24 12:10 PM   Result Value Ref Range    Iron 195 (H) 35 - 150 ug/dL    UIBC <55 (L) 110 - 370 ug/dL    TIBC      % Saturation     Ferritin    Collection Time: 12/13/24 12:10 PM   Result Value Ref Range    Ferritin 3,103 (H) 8 - 150 ng/mL   CBC and Auto Differential    Collection Time: 12/23/24 12:29 PM   Result Value Ref Range    WBC 3.8 (L) 4.4 - 11.3 x10*3/uL    nRBC 0.0 0.0 - 0.0 /100 WBCs    RBC 2.01 (L) 4.00 - 5.20 x10*6/uL    Hemoglobin 6.8 (L) 12.0 - 16.0 g/dL    Hematocrit 21.2 (L) 36.0 - 46.0 %     (H) 80 - 100 fL    MCH 33.8 26.0 - 34.0 pg    MCHC 32.1 32.0 - 36.0 g/dL    RDW 22.6 (H) 11.5 - 14.5 %    Platelets 239 150 - 450 x10*3/uL    Neutrophils % 65.4 40.0 - 80.0 %    Immature Granulocytes %, Automated 0.5 0.0 - 0.9 %    Lymphocytes % 21.6 13.0 - 44.0 %    Monocytes % 10.1 2.0 - 10.0 %    Eosinophils % 1.6 0.0 - 6.0 %    Basophils % 0.8 0.0 - 2.0 %    Neutrophils Absolute 2.45 1.60 - 5.50 x10*3/uL    Immature Granulocytes Absolute, Automated 0.02 0.00 - 0.50 x10*3/uL    Lymphocytes Absolute 0.81 0.80 - 3.00 x10*3/uL    Monocytes Absolute 0.38 0.05 - 0.80 x10*3/uL    Eosinophils Absolute 0.06 0.00 - 0.40 x10*3/uL    Basophils Absolute 0.03 0.00 - 0.10 x10*3/uL   Basic metabolic panel    Collection Time: 12/23/24 12:29 PM   Result Value Ref Range    Glucose 195 (H) 74 - 99 mg/dL    Sodium 138 136 - 145 mmol/L    Potassium 3.7 3.5 - 5.3 mmol/L    Chloride 98 98 - 107 mmol/L    Bicarbonate 30 21 - 32 mmol/L    Anion Gap 14 10 - 20 mmol/L    Urea Nitrogen 24 (H) 6 - 23 mg/dL    Creatinine 2.37 (H) 0.50 - 1.05 mg/dL    eGFR 20 (L) >60 mL/min/1.73m*2     Calcium 9.3 8.6 - 10.3 mg/dL   Type and Screen    Collection Time: 12/23/24 12:29 PM   Result Value Ref Range    ABO TYPE A     Rh TYPE POS     ANTIBODY SCREEN NEG    Morphology    Collection Time: 12/23/24 12:29 PM   Result Value Ref Range    RBC Morphology See Below     Hypochromia Mild     Target Cells Few     Ovalocytes Few    Prepare RBC: 1 Units    Collection Time: 12/23/24  2:19 PM   Result Value Ref Range    PRODUCT CODE [id number]     Unit Number [id number]     Unit ABO A     Unit RH NEG     XM INTEP COMP     Dispense Status TR     Blood Expiration Date 1/2/2025 11:59:00 PM EST     PRODUCT BLOOD TYPE 0600     UNIT VOLUME 350    CBC    Collection Time: 01/09/25  2:19 PM   Result Value Ref Range    WBC 4.2 (L) 4.4 - 11.3 x10*3/uL    RBC 2.10 (L) 4.00 - 5.20 x10*6/uL    Hemoglobin 7.1 (L) 12.0 - 16.0 g/dL    Hematocrit 23.0 (L) 36.0 - 46.0 %     (H) 80 - 100 fL    MCH 33.8 26.0 - 34.0 pg    MCHC 30.9 (L) 32.0 - 36.0 g/dL    RDW 23.9 (H) 11.5 - 14.5 %    Platelets 230 150 - 450 x10*3/uL   Type And Screen    Collection Time: 01/09/25  2:19 PM   Result Value Ref Range    ABO TYPE A     Rh TYPE POS     ANTIBODY SCREEN NEG    CBC and Auto Differential    Collection Time: 01/13/25 10:44 AM   Result Value Ref Range    WBC 3.1 (L) 4.4 - 11.3 x10*3/uL    nRBC      RBC 1.85 (L) 4.00 - 5.20 x10*6/uL    Hemoglobin 6.4 (LL) 12.0 - 16.0 g/dL    Hematocrit 20.3 (L) 36.0 - 46.0 %     (H) 80 - 100 fL    MCH 34.6 (H) 26.0 - 34.0 pg    MCHC 31.5 (L) 32.0 - 36.0 g/dL    RDW 23.7 (H) 11.5 - 14.5 %    Platelets 226 150 - 450 x10*3/uL    Neutrophils % 53.1 40.0 - 80.0 %    Immature Granulocytes %, Automated 0.3 0.0 - 0.9 %    Lymphocytes % 29.8 13.0 - 44.0 %    Monocytes % 8.7 2.0 - 10.0 %    Eosinophils % 7.1 0.0 - 6.0 %    Basophils % 1.0 0.0 - 2.0 %    Neutrophils Absolute 1.66 1.60 - 5.50 x10*3/uL    Immature Granulocytes Absolute, Automated 0.01 0.00 - 0.50 x10*3/uL    Lymphocytes Absolute 0.93 0.80 - 3.00  x10*3/uL    Monocytes Absolute 0.27 0.05 - 0.80 x10*3/uL    Eosinophils Absolute 0.22 0.00 - 0.40 x10*3/uL    Basophils Absolute 0.03 0.00 - 0.10 x10*3/uL   Type And Screen    Collection Time: 01/13/25 10:44 AM   Result Value Ref Range    ABO TYPE A     Rh TYPE POS     ANTIBODY SCREEN NEG    Morphology    Collection Time: 01/13/25 10:44 AM   Result Value Ref Range    RBC Morphology See Below     Polychromasia Mild     Hypochromia Marked     Ovalocytes Few     Teardrop Cells Few    Prepare RBC: 2 Units    Collection Time: 01/13/25 12:17 PM   Result Value Ref Range    PRODUCT CODE P7830H40     Unit Number [id number]     Unit ABO A     Unit RH POS     XM INTEP COMP     Dispense Status RE     Blood Expiration Date 2/9/2025 11:59:00 PM EST     PRODUCT BLOOD TYPE 6200     UNIT VOLUME 350     PRODUCT CODE G6691W44     Unit Number [id number]     Unit ABO A     Unit RH POS     XM INTEP COMP     Dispense Status TR     Blood Expiration Date 1/31/2025 11:59:00 PM EST     PRODUCT BLOOD TYPE 6200     UNIT VOLUME 350    Direct antiglobulin test    Collection Time: 01/14/25  1:00 PM   Result Value Ref Range    SILVANO-POLYSPECIFIC NEG    ABO/Rh    Collection Time: 01/14/25  1:00 PM   Result Value Ref Range    ABO TYPE A     Rh TYPE POS    Transfusion Reaction Evaluation    Collection Time: 01/14/25  2:38 PM   Result Value Ref Range    Unit Number [id number]     COMPONENT CODE B3261B27    Path Review-Transfusion Reaction    Collection Time: 01/14/25  2:38 PM   Result Value Ref Range    TYPE OF REACTION FEBRILE NON-HEMOLYTIC TRANSFUSION REACTION     PATH REVIEW-TRANSFUSION REACTION       CLERICAL CHECK SHOWS NO DISCREPANCIES IN THE PAPER-WORK ASSOCIATED WITH THE TRANSFUSION.     THE PATIENT'S ABO GROUP, RH(D) TYPE AND ANTIBODY SCREEN ON THE PRE- AND POST- TRANSFUSION SPECIMEN SHOW NO DISCREPANCIES.    THERE IS NO VISIBLE HEMOLYSIS IN THE POST TRANSFUSION SPECIMEN.    THE DIRECT ANTIGLOBULIN TEST ON THE POST-TRANSFUSION  SPECIMEN IS NEGATIVE.    FINDINGS ARE CONSISTENT WITH FEBRILE NON-HEMOLYTIC TRANSFUSION REACTION (FNHTR). THE PATIENT SHOULD BE PREMEDICATED WITH ANTIHISTAMINES AND ANTIPYRECTICS PRIOR TO THE NEXT TRANSFUSION.      DIAGNOSIS-BB T80.89XA    Transfusion Reaction Culture    Collection Time: 01/14/25  2:38 PM    Specimen: Blood   Result Value Ref Range    Transfusion Reaction Culture No growth     Gram Stain No organisms seen    CBC and Auto Differential    Collection Time: 01/21/25  8:46 AM   Result Value Ref Range    WBC 3.3 (L) 4.4 - 11.3 x10*3/uL    nRBC      RBC 2.41 (L) 4.00 - 5.20 x10*6/uL    Hemoglobin 8.1 (L) 12.0 - 16.0 g/dL    Hematocrit 25.7 (L) 36.0 - 46.0 %     (H) 80 - 100 fL    MCH 33.6 26.0 - 34.0 pg    MCHC 31.5 (L) 32.0 - 36.0 g/dL    RDW 23.4 (H) 11.5 - 14.5 %    Platelets 206 150 - 450 x10*3/uL    Neutrophils % 49.6 40.0 - 80.0 %    Immature Granulocytes %, Automated 0.3 0.0 - 0.9 %    Lymphocytes % 33.0 13.0 - 44.0 %    Monocytes % 11.0 2.0 - 10.0 %    Eosinophils % 5.2 0.0 - 6.0 %    Basophils % 0.9 0.0 - 2.0 %    Neutrophils Absolute 1.62 1.60 - 5.50 x10*3/uL    Immature Granulocytes Absolute, Automated 0.01 0.00 - 0.50 x10*3/uL    Lymphocytes Absolute 1.08 0.80 - 3.00 x10*3/uL    Monocytes Absolute 0.36 0.05 - 0.80 x10*3/uL    Eosinophils Absolute 0.17 0.00 - 0.40 x10*3/uL    Basophils Absolute 0.03 0.00 - 0.10 x10*3/uL   Type And Screen    Collection Time: 01/21/25  8:46 AM   Result Value Ref Range    ABO TYPE A     Rh TYPE POS     ANTIBODY SCREEN NEG    Morphology    Collection Time: 01/21/25  8:46 AM   Result Value Ref Range    RBC Morphology See Below     Polychromasia Mild     Hypochromia Mild     Ovalocytes Few     Teardrop Cells Few    CBC and Auto Differential    Collection Time: 01/27/25 11:09 AM   Result Value Ref Range    WBC 4.0 (L) 4.4 - 11.3 x10*3/uL    nRBC      RBC 2.25 (L) 4.00 - 5.20 x10*6/uL    Hemoglobin 7.6 (L) 12.0 - 16.0 g/dL    Hematocrit 24.2 (L) 36.0 - 46.0 %      (H) 80 - 100 fL    MCH 33.8 26.0 - 34.0 pg    MCHC 31.4 (L) 32.0 - 36.0 g/dL    RDW 23.6 (H) 11.5 - 14.5 %    Platelets 232 150 - 450 x10*3/uL    Neutrophils % 50.8 40.0 - 80.0 %    Immature Granulocytes %, Automated 0.3 0.0 - 0.9 %    Lymphocytes % 35.3 13.0 - 44.0 %    Monocytes % 8.3 2.0 - 10.0 %    Eosinophils % 4.5 0.0 - 6.0 %    Basophils % 0.8 0.0 - 2.0 %    Neutrophils Absolute 2.02 1.60 - 5.50 x10*3/uL    Immature Granulocytes Absolute, Automated 0.01 0.00 - 0.50 x10*3/uL    Lymphocytes Absolute 1.40 0.80 - 3.00 x10*3/uL    Monocytes Absolute 0.33 0.05 - 0.80 x10*3/uL    Eosinophils Absolute 0.18 0.00 - 0.40 x10*3/uL    Basophils Absolute 0.03 0.00 - 0.10 x10*3/uL   Morphology    Collection Time: 01/27/25 11:09 AM   Result Value Ref Range    RBC Morphology See Below     Hypochromia Mild     Ovalocytes Few    Type And Screen    Collection Time: 02/04/25  1:47 PM   Result Value Ref Range    ABO TYPE A     Rh TYPE POS     ANTIBODY SCREEN NEG    CBC and Auto Differential    Collection Time: 02/04/25  1:47 PM   Result Value Ref Range    WBC 4.0 (L) 4.4 - 11.3 x10*3/uL    nRBC      RBC 2.11 (L) 4.00 - 5.20 x10*6/uL    Hemoglobin 7.4 (L) 12.0 - 16.0 g/dL    Hematocrit 22.4 (L) 36.0 - 46.0 %     (H) 80 - 100 fL    MCH 35.1 (H) 26.0 - 34.0 pg    MCHC 33.0 32.0 - 36.0 g/dL    RDW 25.1 (H) 11.5 - 14.5 %    Platelets 211 150 - 450 x10*3/uL    Neutrophils % 54.2 40.0 - 80.0 %    Immature Granulocytes %, Automated 0.2 0.0 - 0.9 %    Lymphocytes % 30.2 13.0 - 44.0 %    Monocytes % 9.9 2.0 - 10.0 %    Eosinophils % 5.0 0.0 - 6.0 %    Basophils % 0.5 0.0 - 2.0 %    Neutrophils Absolute 2.19 1.60 - 5.50 x10*3/uL    Immature Granulocytes Absolute, Automated 0.01 0.00 - 0.50 x10*3/uL    Lymphocytes Absolute 1.22 0.80 - 3.00 x10*3/uL    Monocytes Absolute 0.40 0.05 - 0.80 x10*3/uL    Eosinophils Absolute 0.20 0.00 - 0.40 x10*3/uL    Basophils Absolute 0.02 0.00 - 0.10 x10*3/uL   Morphology    Collection Time:  02/04/25  1:47 PM   Result Value Ref Range    RBC Morphology See Below     Polychromasia Mild     Hypochromia Marked     Ovalocytes Few     Teardrop Cells Few    Vitamin B12    Collection Time: 02/04/25  1:52 PM   Result Value Ref Range    Vitamin B12 940 (H) 211 - 911 pg/mL   Ferritin    Collection Time: 02/04/25  1:52 PM   Result Value Ref Range    Ferritin 3,552 (H) 8 - 150 ng/mL   Lactate dehydrogenase    Collection Time: 02/04/25  1:52 PM   Result Value Ref Range     84 - 246 U/L   Reticulocytes    Collection Time: 02/04/25  1:52 PM   Result Value Ref Range    Retic % 3.2 (H) 0.5 - 2.0 %    Retic Absolute 0.067 0.017 - 0.110 x10*6/uL    Reticulocyte Hemoglobin 32 28 - 38 pg    Immature Retic fraction 30.7 (H) <=16.0 %   C-Reactive Protein    Collection Time: 02/04/25  1:52 PM   Result Value Ref Range    C-Reactive Protein 1.18 (H) <1.00 mg/dL   Copper, Blood    Collection Time: 02/04/25  1:52 PM   Result Value Ref Range    Copper 91.7 80.0 - 155.0 ug/dL   Prepare RBC: 1 Units, Irradiated, Leukocytes Reduced (CMV reduced risk)    Collection Time: 02/05/25  1:32 PM   Result Value Ref Range    PRODUCT CODE J9544W43     Unit Number [id number]     Unit ABO A     Unit RH POS     XM INTEP COMP     Dispense Status TR     Blood Expiration Date 3/5/2025 11:59:00 PM EST     PRODUCT BLOOD TYPE 6200     UNIT VOLUME 350    CBC and Auto Differential    Collection Time: 02/11/25  9:54 AM   Result Value Ref Range    WBC 4.2 (L) 4.4 - 11.3 x10*3/uL    nRBC      RBC 2.59 (L) 4.00 - 5.20 x10*6/uL    Hemoglobin 8.8 (L) 12.0 - 16.0 g/dL    Hematocrit 27.4 (L) 36.0 - 46.0 %     (H) 80 - 100 fL    MCH 34.0 26.0 - 34.0 pg    MCHC 32.1 32.0 - 36.0 g/dL    RDW 24.2 (H) 11.5 - 14.5 %    Platelets 204 150 - 450 x10*3/uL    Neutrophils % 51.6 40.0 - 80.0 %    Immature Granulocytes %, Automated 0.2 0.0 - 0.9 %    Lymphocytes % 32.5 13.0 - 44.0 %    Monocytes % 8.8 2.0 - 10.0 %    Eosinophils % 6.2 0.0 - 6.0 %    Basophils  % 0.7 0.0 - 2.0 %    Neutrophils Absolute 2.18 1.60 - 5.50 x10*3/uL    Immature Granulocytes Absolute, Automated 0.01 0.00 - 0.50 x10*3/uL    Lymphocytes Absolute 1.37 0.80 - 3.00 x10*3/uL    Monocytes Absolute 0.37 0.05 - 0.80 x10*3/uL    Eosinophils Absolute 0.26 0.00 - 0.40 x10*3/uL    Basophils Absolute 0.03 0.00 - 0.10 x10*3/uL   Morphology    Collection Time: 02/11/25  9:54 AM   Result Value Ref Range    RBC Morphology See Below     Hypochromia Mild     Target Cells Few     Ovalocytes Few    PNH Profile    Collection Time: 02/11/25  9:55 AM   Result Value Ref Range    PNH Results Negative Negative    Interpretation, PNH       Flow cytometric analysis does not show any evidence of a PNH clone based upon analysis of a variety of glycophosphatidylinositol anchored proteins (GPI-AP) on red blood cells, monocytes and granulocytes.  These findings do not support a diagnosis of PNH. Clinical correlation is recommended.      Path Review, PNH       Electronically signed out by Husam Self MD on 2/20/25 at 5:12 PM.  By the signature on this report, the individual or group listed as making the Final Interpretation/Diagnosis certifies that they have reviewed this case and the staining reactivity of the antibodies and reagents in the analysis were determined to be acceptable. Diagnostic interpretation performed at [facility]   CBC and Auto Differential    Collection Time: 02/17/25 11:09 AM   Result Value Ref Range    WBC 3.4 (L) 4.4 - 11.3 x10*3/uL    nRBC      RBC 2.25 (L) 4.00 - 5.20 x10*6/uL    Hemoglobin 7.7 (L) 12.0 - 16.0 g/dL    Hematocrit 24.1 (L) 36.0 - 46.0 %     (H) 80 - 100 fL    MCH 34.2 (H) 26.0 - 34.0 pg    MCHC 32.0 32.0 - 36.0 g/dL    RDW 23.5 (H) 11.5 - 14.5 %    Platelets 194 150 - 450 x10*3/uL    Neutrophils % 62.8 40.0 - 80.0 %    Immature Granulocytes %, Automated 0.3 0.0 - 0.9 %    Lymphocytes % 22.8 13.0 - 44.0 %    Monocytes % 9.4 2.0 - 10.0 %    Eosinophils % 4.1 0.0 - 6.0 %     Basophils % 0.6 0.0 - 2.0 %    Neutrophils Absolute 2.15 1.60 - 5.50 x10*3/uL    Immature Granulocytes Absolute, Automated 0.01 0.00 - 0.50 x10*3/uL    Lymphocytes Absolute 0.78 (L) 0.80 - 3.00 x10*3/uL    Monocytes Absolute 0.32 0.05 - 0.80 x10*3/uL    Eosinophils Absolute 0.14 0.00 - 0.40 x10*3/uL    Basophils Absolute 0.02 0.00 - 0.10 x10*3/uL   Morphology    Collection Time: 02/17/25 11:09 AM   Result Value Ref Range    RBC Morphology See Below     Hypochromia Mild     Ovalocytes Few     Teardrop Cells Few    Type And Screen    Collection Time: 02/24/25 10:50 AM   Result Value Ref Range    ABO TYPE A     Rh TYPE POS     ANTIBODY SCREEN NEG    CBC and Auto Differential    Collection Time: 02/24/25 10:50 AM   Result Value Ref Range    WBC 3.3 (L) 4.4 - 11.3 x10*3/uL    nRBC      RBC 1.98 (L) 4.00 - 5.20 x10*6/uL    Hemoglobin 6.6 (L) 12.0 - 16.0 g/dL    Hematocrit 21.3 (L) 36.0 - 46.0 %     (H) 80 - 100 fL    MCH 33.3 26.0 - 34.0 pg    MCHC 31.0 (L) 32.0 - 36.0 g/dL    RDW 23.7 (H) 11.5 - 14.5 %    Platelets 189 150 - 450 x10*3/uL    Neutrophils % 56.2 40.0 - 80.0 %    Immature Granulocytes %, Automated 0.3 0.0 - 0.9 %    Lymphocytes % 26.9 13.0 - 44.0 %    Monocytes % 10.0 2.0 - 10.0 %    Eosinophils % 5.7 0.0 - 6.0 %    Basophils % 0.9 0.0 - 2.0 %    Neutrophils Absolute 1.86 1.60 - 5.50 x10*3/uL    Immature Granulocytes Absolute, Automated 0.01 0.00 - 0.50 x10*3/uL    Lymphocytes Absolute 0.89 0.80 - 3.00 x10*3/uL    Monocytes Absolute 0.33 0.05 - 0.80 x10*3/uL    Eosinophils Absolute 0.19 0.00 - 0.40 x10*3/uL    Basophils Absolute 0.03 0.00 - 0.10 x10*3/uL   Morphology    Collection Time: 02/24/25 10:50 AM   Result Value Ref Range    RBC Morphology See Below     Polychromasia Mild     Hypochromia Marked     Ovalocytes Few     Teardrop Cells Few     Basophilic Stippling Present    Prepare RBC: 1 Units, Irradiated, Leukocytes Reduced (CMV reduced risk)    Collection Time: 02/25/25 11:38 AM   Result Value  Ref Range    PRODUCT CODE [id number]     Unit Number [id number]     Unit ABO A     Unit RH NEG     XM INTEP COMP     Dispense Status TR     Blood Expiration Date 3/13/2025 11:59:00 PM EDT     PRODUCT BLOOD TYPE 0600     UNIT VOLUME 350    Type And Screen    Collection Time: 03/03/25 11:32 AM   Result Value Ref Range    ABO TYPE A     Rh TYPE POS     ANTIBODY SCREEN NEG    CBC    Collection Time: 03/03/25 11:32 AM   Result Value Ref Range    WBC 3.7 (L) 4.4 - 11.3 x10*3/uL    RBC 2.66 (L) 4.00 - 5.20 x10*6/uL    Hemoglobin 8.8 (L) 12.0 - 16.0 g/dL    Hematocrit 27.2 (L) 36.0 - 46.0 %     (H) 80 - 100 fL    MCH 33.1 26.0 - 34.0 pg    MCHC 32.4 32.0 - 36.0 g/dL    RDW 20.9 (H) 11.5 - 14.5 %    Platelets 230 150 - 450 x10*3/uL   CBC    Collection Time: 03/18/25 11:51 AM   Result Value Ref Range    WBC 3.8 (L) 4.4 - 11.3 x10*3/uL    RBC 1.92 (L) 4.00 - 5.20 x10*6/uL    Hemoglobin 6.3 (LL) 12.0 - 16.0 g/dL    Hematocrit 20.1 (L) 36.0 - 46.0 %     (H) 80 - 100 fL    MCH 32.8 26.0 - 34.0 pg    MCHC 31.3 (L) 32.0 - 36.0 g/dL    RDW 20.9 (H) 11.5 - 14.5 %    Platelets 201 150 - 450 x10*3/uL   Type And Screen    Collection Time: 03/18/25 11:51 AM   Result Value Ref Range    ABO TYPE A     Rh TYPE POS     ANTIBODY SCREEN NEG    CBC and Auto Differential    Collection Time: 03/19/25  8:33 AM   Result Value Ref Range    WBC 4.3 (L) 4.4 - 11.3 x10*3/uL    nRBC 0.0 0.0 - 0.0 /100 WBCs    RBC 2.06 (L) 4.00 - 5.20 x10*6/uL    Hemoglobin 6.7 (L) 12.0 - 16.0 g/dL    Hematocrit 20.5 (L) 36.0 - 46.0 %     80 - 100 fL    MCH 32.5 26.0 - 34.0 pg    MCHC 32.7 32.0 - 36.0 g/dL    RDW 21.3 (H) 11.5 - 14.5 %    Platelets 206 150 - 450 x10*3/uL    Neutrophils % 72.8 40.0 - 80.0 %    Immature Granulocytes %, Automated 0.2 0.0 - 0.9 %    Lymphocytes % 15.0 13.0 - 44.0 %    Monocytes % 9.2 2.0 - 10.0 %    Eosinophils % 2.1 0.0 - 6.0 %    Basophils % 0.7 0.0 - 2.0 %    Neutrophils Absolute 3.15 1.60 - 5.50 x10*3/uL     Immature Granulocytes Absolute, Automated 0.01 0.00 - 0.50 x10*3/uL    Lymphocytes Absolute 0.65 (L) 0.80 - 3.00 x10*3/uL    Monocytes Absolute 0.40 0.05 - 0.80 x10*3/uL    Eosinophils Absolute 0.09 0.00 - 0.40 x10*3/uL    Basophils Absolute 0.03 0.00 - 0.10 x10*3/uL   Comprehensive metabolic panel    Collection Time: 03/19/25  8:33 AM   Result Value Ref Range    Glucose 179 (H) 74 - 99 mg/dL    Sodium 137 136 - 145 mmol/L    Potassium 4.4 3.5 - 5.3 mmol/L    Chloride 99 98 - 107 mmol/L    Bicarbonate 29 21 - 32 mmol/L    Anion Gap 13 10 - 20 mmol/L    Urea Nitrogen 30 (H) 6 - 23 mg/dL    Creatinine 2.42 (H) 0.50 - 1.05 mg/dL    eGFR 20 (L) >60 mL/min/1.73m*2    Calcium 9.4 8.6 - 10.3 mg/dL    Albumin 4.0 3.4 - 5.0 g/dL    Alkaline Phosphatase 87 33 - 136 U/L    Total Protein 6.4 6.4 - 8.2 g/dL    AST 15 9 - 39 U/L    Bilirubin, Total 1.0 0.0 - 1.2 mg/dL    ALT 16 7 - 45 U/L   Lactate    Collection Time: 03/19/25  8:33 AM   Result Value Ref Range    Lactate 1.1 0.4 - 2.0 mmol/L   Blood Gas Venous Full Panel    Collection Time: 03/19/25  8:33 AM   Result Value Ref Range    POCT pH, Venous 7.42 7.33 - 7.43 pH    POCT pCO2, Venous 48 41 - 51 mm Hg    POCT pO2, Venous 58 (H) 35 - 45 mm Hg    POCT SO2, Venous 90 (H) 45 - 75 %    POCT Oxy Hemoglobin, Venous 86.7 (H) 45.0 - 75.0 %    POCT Hematocrit Calculated, Venous 20.0 (L) 36.0 - 46.0 %    POCT Sodium, Venous 136 136 - 145 mmol/L    POCT Potassium, Venous 4.4 3.5 - 5.3 mmol/L    POCT Chloride, Venous 103 98 - 107 mmol/L    POCT Ionized Calicum, Venous 1.30 1.10 - 1.33 mmol/L    POCT Glucose, Venous 187 (H) 74 - 99 mg/dL    POCT Lactate, Venous 1.0 0.4 - 2.0 mmol/L    POCT Base Excess, Venous 6.0 (H) -2.0 - 3.0 mmol/L    POCT HCO3 Calculated, Venous 31.1 (H) 22.0 - 26.0 mmol/L    POCT Hemoglobin, Venous 6.5 (LL) 12.0 - 16.0 g/dL    POCT Anion Gap, Venous 6.0 (L) 10.0 - 25.0 mmol/L    Patient Temperature 37.0 degrees Celsius    FiO2 28 %   Sars-CoV-2 PCR    Collection  Time: 03/19/25  8:33 AM   Result Value Ref Range    Coronavirus 2019, PCR Not Detected Not Detected   Influenza A, and B PCR    Collection Time: 03/19/25  8:33 AM   Result Value Ref Range    Flu A Result Detected (A) Not Detected    Flu B Result Not Detected Not Detected   RSV PCR    Collection Time: 03/19/25  8:33 AM   Result Value Ref Range    RSV PCR Not Detected Not Detected   Troponin I, High Sensitivity, Initial    Collection Time: 03/19/25  8:33 AM   Result Value Ref Range    Troponin I, High Sensitivity 17 (H) 0 - 13 ng/L   Morphology    Collection Time: 03/19/25  8:33 AM   Result Value Ref Range    RBC Morphology See Below     Hypochromia Mild     Ovalocytes Few    ECG 12 lead    Collection Time: 03/19/25  8:33 AM   Result Value Ref Range    Ventricular Rate 88 BPM    Atrial Rate 85 BPM    HI Interval 171 ms    QRS Duration 86 ms    QT Interval 375 ms    QTC Calculation(Bazett) 454 ms    P Axis 37 degrees    R Axis 22 degrees    T Axis 39 degrees    QRS Count 14 beats    Q Onset 252 ms    T Offset 439 ms    QTC Fredericia 426 ms   Troponin, High Sensitivity, 1 Hour    Collection Time: 03/19/25  9:32 AM   Result Value Ref Range    Troponin I, High Sensitivity 17 (H) 0 - 13 ng/L   Ferritin    Collection Time: 03/19/25  9:32 AM   Result Value Ref Range    Ferritin 3,132 (H) 8 - 150 ng/mL   Urinalysis with Reflex Culture and Microscopic    Collection Time: 03/19/25 12:36 PM   Result Value Ref Range    Color, Urine Light-Yellow Light-Yellow, Yellow, Dark-Yellow    Appearance, Urine Clear Clear    Specific Gravity, Urine 1.012 1.005 - 1.035    pH, Urine 8.0 5.0, 5.5, 6.0, 6.5, 7.0, 7.5, 8.0    Protein, Urine 200 (2+) (A) NEGATIVE, 10 (TRACE), 20 (TRACE) mg/dL    Glucose, Urine 50 (TRACE) (A) Normal mg/dL    Blood, Urine NEGATIVE NEGATIVE mg/dL    Ketones, Urine NEGATIVE NEGATIVE mg/dL    Bilirubin, Urine NEGATIVE NEGATIVE mg/dL    Urobilinogen, Urine Normal Normal mg/dL    Nitrite, Urine NEGATIVE NEGATIVE     Leukocyte Esterase, Urine NEGATIVE NEGATIVE   Extra Urine Gray Tube    Collection Time: 03/19/25 12:36 PM   Result Value Ref Range    Extra Tube Hold for add-ons.    Urinalysis Microscopic    Collection Time: 03/19/25 12:36 PM   Result Value Ref Range    WBC, Urine 6-10 (A) 1-5, NONE /HPF    RBC, Urine 1-2 NONE, 1-2, 3-5 /HPF    Squamous Epithelial Cells, Urine 1-9 (SPARSE) Reference range not established. /HPF    Bacteria, Urine 1+ (A) NONE SEEN /HPF   POCT GLUCOSE    Collection Time: 03/19/25  4:05 PM   Result Value Ref Range    POCT Glucose 164 (H) 74 - 99 mg/dL   POCT GLUCOSE    Collection Time: 03/19/25  8:51 PM   Result Value Ref Range    POCT Glucose 113 (H) 74 - 99 mg/dL   Blood Culture    Collection Time: 03/20/25 12:32 AM    Specimen: Peripheral Venipuncture; Blood culture   Result Value Ref Range    Blood Culture No growth at 1 day    Blood Culture    Collection Time: 03/20/25 12:32 AM    Specimen: Peripheral Venipuncture; Blood culture   Result Value Ref Range    Blood Culture No growth at 1 day    CBC    Collection Time: 03/20/25  3:20 AM   Result Value Ref Range    WBC 4.2 (L) 4.4 - 11.3 x10*3/uL    nRBC 0.0 0.0 - 0.0 /100 WBCs    RBC 1.61 (L) 4.00 - 5.20 x10*6/uL    Hemoglobin 5.4 (LL) 12.0 - 16.0 g/dL    Hematocrit 16.3 (L) 36.0 - 46.0 %     (H) 80 - 100 fL    MCH 33.5 26.0 - 34.0 pg    MCHC 33.1 32.0 - 36.0 g/dL    RDW 21.5 (H) 11.5 - 14.5 %    Platelets 156 150 - 450 x10*3/uL   Basic metabolic panel    Collection Time: 03/20/25  3:20 AM   Result Value Ref Range    Glucose 172 (H) 74 - 99 mg/dL    Sodium 136 136 - 145 mmol/L    Potassium 3.7 3.5 - 5.3 mmol/L    Chloride 101 98 - 107 mmol/L    Bicarbonate 29 21 - 32 mmol/L    Anion Gap 10 10 - 20 mmol/L    Urea Nitrogen 14 6 - 23 mg/dL    Creatinine 1.26 (H) 0.50 - 1.05 mg/dL    eGFR 43 (L) >60 mL/min/1.73m*2    Calcium 8.4 (L) 8.6 - 10.3 mg/dL   Prepare RBC: 2 Units    Collection Time: 03/20/25  4:11 AM   Result Value Ref Range    PRODUCT  CODE P2369I30     Unit Number [id number]     Unit ABO A     Unit RH NEG     XM INTEP COMP     Dispense Status TR     Blood Expiration Date 4/15/2025 11:59:00 PM EDT     PRODUCT BLOOD TYPE 0600     UNIT VOLUME 350     PRODUCT CODE [id number]     Unit Number [id number]     Unit ABO A     Unit RH NEG     XM INTEP COMP     Dispense Status TR     Blood Expiration Date 4/3/2025 11:59:00 PM EDT     PRODUCT BLOOD TYPE 0600     UNIT VOLUME 285    Type and screen    Collection Time: 03/20/25  4:32 AM   Result Value Ref Range    ABO TYPE A     Rh TYPE POS     ANTIBODY SCREEN NEG    POCT GLUCOSE    Collection Time: 03/20/25  7:01 AM   Result Value Ref Range    POCT Glucose 135 (H) 74 - 99 mg/dL   POCT GLUCOSE    Collection Time: 03/20/25 11:20 AM   Result Value Ref Range    POCT Glucose 150 (H) 74 - 99 mg/dL   POCT GLUCOSE    Collection Time: 03/20/25  4:26 PM   Result Value Ref Range    POCT Glucose 127 (H) 74 - 99 mg/dL   POCT GLUCOSE    Collection Time: 03/20/25  9:23 PM   Result Value Ref Range    POCT Glucose 134 (H) 74 - 99 mg/dL   Basic Metabolic Panel    Collection Time: 03/21/25  4:02 AM   Result Value Ref Range    Glucose 120 (H) 74 - 99 mg/dL    Sodium 135 (L) 136 - 145 mmol/L    Potassium 3.7 3.5 - 5.3 mmol/L    Chloride 102 98 - 107 mmol/L    Bicarbonate 29 21 - 32 mmol/L    Anion Gap 8 (L) 10 - 20 mmol/L    Urea Nitrogen 29 (H) 6 - 23 mg/dL    Creatinine 2.34 (H) 0.50 - 1.05 mg/dL    eGFR 21 (L) >60 mL/min/1.73m*2    Calcium 8.5 (L) 8.6 - 10.3 mg/dL   CBC    Collection Time: 03/21/25  4:02 AM   Result Value Ref Range    WBC 4.1 (L) 4.4 - 11.3 x10*3/uL    nRBC 0.0 0.0 - 0.0 /100 WBCs    RBC 2.34 (L) 4.00 - 5.20 x10*6/uL    Hemoglobin 7.5 (L) 12.0 - 16.0 g/dL    Hematocrit 22.6 (L) 36.0 - 46.0 %    MCV 97 80 - 100 fL    MCH 32.1 26.0 - 34.0 pg    MCHC 33.2 32.0 - 36.0 g/dL    RDW 22.6 (H) 11.5 - 14.5 %    Platelets 145 (L) 150 - 450 x10*3/uL     *Note: Due to a large number of results and/or encounters for  the requested time period, some results have not been displayed. A complete set of results can be found in Results Review.       ECG  Encounter Date: 03/19/25   ECG 12 lead   Result Value    Ventricular Rate 88    Atrial Rate 85    GA Interval 171    QRS Duration 86    QT Interval 375    QTC Calculation(Bazett) 454    P Axis 37    R Axis 22    T Axis 39    QRS Count 14    Q Onset 252    T Offset 439    QTC Fredericia 426    Narrative    Sinus rhythm  Low voltage, precordial leads    See ED provider note for full interpretation and clinical correlation  Confirmed by Terri Bermudez ([id number]) on 3/19/2025 3:46:43 PM       Echocardiogram  No results found for this or any previous visit from the past 1095 days.      CV Studies:  EKG:  Encounter Date: 03/19/25   ECG 12 lead   Result Value    Ventricular Rate 88    Atrial Rate 85    GA Interval 171    QRS Duration 86    QT Interval 375    QTC Calculation(Bazett) 454    P Axis 37    R Axis 22    T Axis 39    QRS Count 14    Q Onset 252    T Offset 439    QTC Fredericia 426    Narrative    Sinus rhythm  Low voltage, precordial leads    See ED provider note for full interpretation and clinical correlation  Confirmed by Terri Bermudez ([id number]) on 3/19/2025 3:46:43 PM     Echocardiogram:   E[place]ocardi[place] [address]  Phone [phone] Fax [phone]    TRANSTHORACIC ECHOCARDIOGRAM REPORT      Patient Name:     YVONNE Motta Physician:   [id number] Humphrey Callaway DO  Study Date:       12/14/2022          Referring Physician: JASMIN ROACH  MRN/PID:          [id number]            PCP:  Accession/Order#: [id number]           Department Location: [facility]  YOB: 1945           Fellow:  Gender:           F                   Nurse:  Admit Date:       12/14/2022          Sonographer:         Urvashi Arora [facility]  Admission Status: Inpatient - Routine Additional Staff:  Height:            152.40 cm           CC Report to:  Weight:           75.30 kg            Study Type:          Echocardiogram  BSA:              1.72 m2  Blood Pressure: 159 /63 mmHg    Diagnosis/ICD: R06.00-Dyspnea, unspecified  Indication:    Dyspnea  Procedure/CPT: Echo Complete w Full [id number]    Patient History:  Pertinent History: Dyspnea.    Study Detail: The following Echo studies were performed: 2D, M-Mode, Doppler and  color flow.      PHYSICIAN INTERPRETATION:  Left Ventricle: Left ventricular systolic function is low normal, with an estimated ejection fraction of 50-55%. There are no regional wall motion abnormalities. The left ventricular cavity size is normal. The left ventricular septal wall thickness is mildly increased. Left ventricular diastolic filling was indeterminate.  Left Atrium: The left atrium is moderate to severely dilated.  Right Ventricle: The right ventricle is normal in size. There is low normal right ventricular systolic function.  Right Atrium: The right atrium is mildly dilated.  Aortic Valve: The aortic valve is trileaflet. There is no evidence of aortic valve regurgitation.  Mitral Valve: The mitral valve is normal in structure. There is mild mitral valve regurgitation.  Tricuspid Valve: The tricuspid valve is structurally normal. There is mild to moderate tricuspid regurgitation. The Doppler estimated RVSP is moderately elevated at 52.4 mmHg.  Pulmonic Valve: The pulmonic valve is structurally normal. There is trace pulmonic valve regurgitation.  Pericardium: There is no pericardial effusion noted.  Aorta: The aortic root is normal.      CONCLUSIONS:  1. Left ventricular systolic function is low normal with a 50-55% estimated ejection fraction.  2. There is low normal right ventricular systolic function.  3. The left atrium is moderate to severely dilated.  4. Mild to moderate tricuspid regurgitation.  5. Moderately elevated right ventricular systolic pressure.    QUANTITATIVE DATA  SUMMARY:  2D MEASUREMENTS:  Normal Ranges:  IVSd:          1.29 cm    (0.6-1.1cm)  LVPWd:         1.04 cm    (0.6-1.1cm)  LVIDd:         5.17 cm    (3.9-5.9cm)  LVIDs:         3.93 cm  LV Mass Index: 136.8 g/m2  LV % FS        24.0 %    LA VOLUME:  Normal Ranges:  LA Vol A4C:        81.6 ml    (22+/-6mL/m2)  LA Vol A2C:        81.6 ml  LA Vol BP:         81.6 ml  LA Vol Index A4C:  47.3ml/m2  LA Vol Index A2C:  47.3 ml/m2  LA Vol Index BP:   47.3 ml/m2  LA Area A4C:       24.0 cm2  LA Area A2C:       24.0 cm2  LA Major Axis A4C: 6.0 cm  LA Major Axis A2C: 6.0 cm  LA Volume Index:   47.0 ml/m2  LA Vol A4C:        80.3 ml  LA Vol A2C:        71.0 ml    RA VOLUME BY A/L METHOD:  Normal Ranges:  RA Area A4C: 15.0 cm2    AORTA MEASUREMENTS:  Normal Ranges:  AoV Silva,s: 2.90 cm (1.4-2.6cm)  Asc Ao, d: 3.30 cm (2.1-3.4cm)    LV SYSTOLIC FUNCTION BY 2D PLANIMETRY (MOD):  Normal Ranges:  EF-A4C View: 46.2 % (>=55%)  EF-A2C View: 55.6 %  EF-Biplane:  50.6 %    LV DIASTOLIC FUNCTION:  Normal Ranges:  MV Peak E:    1.05 m/s    (0.7-1.2 m/s)  MV Peak A:    0.91 m/s    (0.42-0.7 m/s)  E/A Ratio:    1.14        (1.0-2.2)  MV e'         0.08 m/s    (>8.0)  MV lateral e' 0.10 m/s  MV medial e'  0.07 m/s  MV A Dur:     108.47 msec  E/e' Ratio:   12.32       (<8.0)  LV IVRT:      74 msec     (<100ms)    MITRAL VALVE:  Normal Ranges:  MV DT: 153 msec (150-240msec)    AORTIC VALVE:  Normal Ranges:  LVOT Max Mario:  1.07 m/s (<=1.1m/s)  LVOT VTI:      25.50 cm  LVOT Diameter: 1.73 cm  (1.8-2.4cm)    RIGHT VENTRICLE:  RV 1   2.8 cm  RV 2   2.3 cm  RV 3   6.4 cm  TAPSE: 26.5 mm  RV s'  0.12 m/s    TRICUSPID VALVE/RVSP:  Normal Ranges:  Peak TR Velocity: 3.51 m/s  RV Syst Pressure: 52.4 mmHg (< 30mmHg)  TV E Vmax:        0.42 m/s  (0.3-0.7m/s)  TV A Vmax:        0.56 m/s  IVC Diam:         1.33 cm  TV e'             0.1 m/s    AORTA:  Asc Ao Diam 3.27 cm      [id number] Humphrey Callaway DO  Electronically signed on 12/14/2022 at 4:35:23 PM         Final    Stress Testing [facility]([id number]):   NM CARDIAC STRESS REST (MYOCARDIAL PERFUSION MIBI) 04/08/2022    Narrative  MRN: [id number]  Patient Name: YVONNE BAEZ    STUDY:  CARDIAC STRESS/REST INJECTION; PART 2 STRESS OR REST (NO CHARGE);  CARDIAC STRESS/REST (MYOCARDIAL PERFUSION/MIBI);  4/8/2022 12:00 pm    INDICATION:  CP .    COMPARISON:  None.    ACCESSION NUMBER(S):  [id number]; [id number]; [id number]    ORDERING CLINICIAN:  SHERIE MARINO    TECHNIQUE:  DIVISION OF NUCLEAR MEDICINE  PHARMACOLOGIC STRESS MYOCARDIAL PERFUSION SCAN, ONE DAY PROTOCOL    The patient received an intravenous dose of  11.6 mCi of Tc-99m  tetrofosmin and resting emission tomographic (SPECT) images of the  myocardium were acquired. The patient then received an intravenous  infusion of 0.4mg regadenoson (Lexiscan) followed by an additional  dose of  33.7 mCi of Tc-99m tetrofosmin. Stress phase SPECT images of  the myocardium were then acquired. These included ECG-gated images to  assess and quantify ventricular function.    FINDINGS:  There is a small fixed perfusion defect in the mid  anterior/anteroseptal wall with associated wall motion abnormality  consistent with prior infarct. No reversible perfusion defects seen.    Calculated ejection fraction 50% mild hypokinesis of the mid anterior  wall.    Impression  1. There is a small fixed perfusion defect in the mid  anterior/anteroseptal wall with associated wall motion abnormality  consistent with prior infarct. There is a low probability of ischemia.    2. Calculated ejection fraction 50% mild hypokinesis of the mid  anterior wall.    Cardiac Catheterization: No results found for this or any previous visit from the past 1825 days.  No results found for this or any previous visit from the past 3650 days.     Cardiac Scoring: No results found for this or any previous visit from the past 1825 days.    AAA : No results found for this or any previous visit from the past 1825 days.    OTHER: No  results found for this or any previous visit from the past 1825 days.    LAST IMAGING RESULTS  ECG 12 lead  Sinus rhythm  Low voltage, precordial leads    See ED provider note for full interpretation and clinical correlation  Confirmed by Terri Bermudez ([id number]) on 3/19/2025 3:46:43 PM  XR chest 2 views  Narrative: Interpreted By:  Pauline Adame,   STUDY:  XR CHEST 2 VIEWS;  3/19/2025 9:16 am      INDICATION:  Signs/Symptoms:fever and cough.          COMPARISON:  09/20/2024      ACCESSION NUMBER(S):  [id number]      ORDERING CLINICIAN:  MERARY HERNANDEZ      FINDINGS:  AP upright and lateral views were obtained with the patient sitting.      Right internal jugular dual lumen dialysis catheter tip projects in  the right atrium.      CARDIOMEDIASTINAL SILHOUETTE:  Cardiac silhouette is enlarged and appears larger than on the  previous exam. This may be accentuated by the very shallow  inspiration.      LUNGS:  Right hemidiaphragm is elevated relative to the left hemidiaphragm,  not significantly changed from the prior study. Overall the lung  volumes are shallow. There is no focal consolidation noted. No  pleural effusion is identified.      ABDOMEN:  No remarkable upper abdominal findings.      BONES:  No acute osseous changes.      Impression: 1.  Shallow inspiration with no acute pulmonary finding  2. Unchanged elevation of the right hemidiaphragm  3. Cardiomegaly appears greater than on the prior study              MACRO:  None      Signed by: Pauline Adame 3/19/2025 9:51 AM  Dictation workstation:   [id number]      Problem List Items Addressed This Visit       Hyperlipidemia (Chronic)    Myelodysplastic syndrome (Multi) (Chronic)    Paroxysmal atrial fibrillation (Multi) - Primary    Essential (primary) hypertension    Type 2 diabetes mellitus with hyperglycemia, without long-term current use of insulin    ESRD (end stage renal disease) on dialysis (Multi) (Chronic)    Anemia due to chronic kidney  disease, on chronic dialysis (Multi) (Chronic)    (HFpEF) heart failure with preserved ejection fraction          Humphrey Callaway DO, JOHNNYC, FACOI

## 2025-03-22 NOTE — PROGRESS NOTES
"      Nephrology Progress Note      Nephrology following for ESRD.   Events over night: none    Patient is sitting up in chair  Still feels weak and tired      /71 (BP Location: Right arm, Patient Position: Lying)   Pulse 75   Temp 37.1 °C (98.8 °F) (Temporal)   Resp 18   Ht 1.549 m (5' 1\")   Wt 70 kg (154 lb 5.2 oz)   SpO2 95%   BMI 29.16 kg/m²     Input / Output:  24 HR:   Intake/Output Summary (Last 24 hours) at 3/22/2025 1746  Last data filed at 3/22/2025 1700  Gross per 24 hour   Intake 840 ml   Output 450 ml   Net 390 ml       Physical Exam   Mental status more like baseline today  Neck: no JVD  CV: RRR  Lungs: CTA bilaterally  Abd: soft, NT, ND   Ext: no lower extremity edema    Scheduled medications  allopurinol, 100 mg, oral, BID  amLODIPine, 10 mg, oral, Daily  atorvastatin, 10 mg, oral, Nightly  carvedilol, 25 mg, oral, BID  cefTRIAXone, 2 g, intravenous, q24h  cholecalciferol, 2,000 Units, oral, Daily  cyanocobalamin, 1,000 mcg, oral, Daily  doxycylcine, 100 mg, oral, q12h JOSE DE JESUS  epoetin alejandra or biosimilar, 28,000 Units, subcutaneous, Once per day on Monday Wednesday Friday  guaiFENesin, 1,200 mg, oral, BID  insulin lispro, 0-10 Units, subcutaneous, TID AC  ipratropium-albuteroL, 3 mL, nebulization, TID  losartan, 50 mg, oral, Daily  oseltamivir, 30 mg, oral, Once per day on Monday Wednesday Friday  pantoprazole, 40 mg, oral, Daily before breakfast   Or  pantoprazole, 40 mg, intravenous, Daily before breakfast  [Held by provider] pioglitazone, 15 mg, oral, Daily  polyethylene glycol, 17 g, oral, Daily  pregabalin, 75 mg, oral, Daily      Continuous medications     PRN medications  PRN medications: acetaminophen, benzocaine-menthol, bisacodyl, dextrose, dextrose, glucagon, glucagon, ipratropium-albuteroL, melatonin, ondansetron ODT **OR** ondansetron, oxygen   Results from last 7 days   Lab Units 03/22/25  0451 03/20/25  0320 03/19/25  0833   SODIUM mmol/L 139   < > 137   POTASSIUM mmol/L 3.9   " < > 4.4   CHLORIDE mmol/L 98   < > 99   CO2 mmol/L 28   < > 29   BUN mg/dL 19   < > 30*   CREATININE mg/dL 1.74*   < > 2.42*   CALCIUM mg/dL 8.2*   < > 9.4   PROTEIN TOTAL g/dL  --   --  6.4   BILIRUBIN TOTAL mg/dL  --   --  1.0   ALK PHOS U/L  --   --  87   ALT U/L  --   --  16   AST U/L  --   --  15   GLUCOSE mg/dL 89   < > 179*    < > = values in this interval not displayed.           Results from last 7 days   Lab Units 03/22/25  0451 03/21/25  0402 03/20/25  0320   WBC AUTO x10*3/uL 2.2* 4.1* 4.2*   HEMOGLOBIN g/dL 7.0* 7.5* 5.4*   HEMATOCRIT % 21.0* 22.6* 16.3*   PLATELETS AUTO x10*3/uL 105* 145* 156      Results from last 7 days   Lab Units 03/22/25  0451 03/21/25  0402 03/20/25  0320   SODIUM mmol/L 139 135* 136   POTASSIUM mmol/L 3.9 3.7 3.7   CHLORIDE mmol/L 98 102 101   CO2 mmol/L 28 29 29   BUN mg/dL 19 29* 14   CREATININE mg/dL 1.74* 2.34* 1.26*   GLUCOSE mg/dL 89 120* 172*   CALCIUM mg/dL 8.2* 8.5* 8.4*       Assessment & Plan:      Patient is 79 y.o. female history of DM 2, HTN, ESRD, MDS who is admitted to hospital for influenza, fall. Nephrology consulted in view of ESRD.       ESRD  -HD F [facility]  -Access is right IJ TDC     Anemia of ESRD and MDS  -On high-dose MARILYNN with dialysis, will continue here  -PRBC's today. Dr Medina following as well     CKD-MBD  -Not currently on binder, VDRL or calcium emetic  HTN  Acute hypoxic respiratory failure  Influenza A        Recommendations:   -Next hemodialysis Monday  -Continue Epogen 28,000 units Monday Wednesday Friday      Please message me through "[employer] chat with any questions or concerns.     Pinky Christie DO  3/22/2025  5:46 PM     [facility]    [address] [place] [address], [address]   [place] [address]  Office: [phone]

## 2025-03-22 NOTE — PROGRESS NOTES
Occupational Therapy    Evaluation    Patient Name: Tana Hargrove  MRN: [id number]  Department: [facility] 2 E  Room: [address] [place] [address]-A  Today's Date: 3/22/2025  Time Calculation  Start Time: 1050  Stop Time: 1116  Time Calculation (min): 26 min        Assessment:  OT Assessment: Pt is 79 year old female admitted for influenza A, anemia and elevated troponin. Pt requiring MIN-MOD A for mobility and MIN-MAX A for ADLs. Pt would benefit from skilled OT services during hospital to address balance, strength, endurance, safety, mobility and ADLs.  Prognosis: Good  Barriers to Discharge Home: Caregiver assistance, Physical needs  Caregiver Assistance: Patient lives alone and/or does not have reliable caregiver assistance  Physical Needs: Intermittent mobility assistance needed, Intermittent ADL assistance needed, High falls risk due to function or environment, Ambulating household distances limited by function/safety  Evaluation/Treatment Tolerance: Patient tolerated treatment well  End of Session Communication: Bedside nurse  End of Session Patient Position: Up in chair, Alarm on  OT Assessment Results: Decreased ADL status, Decreased upper extremity strength, Decreased safe judgment during ADL, Decreased endurance, Decreased functional mobility, Decreased IADLs  Prognosis: Good  Evaluation/Treatment Tolerance: Patient tolerated treatment well  Plan:  Treatment Interventions: ADL retraining, UE strengthening/ROM, Functional transfer training, Endurance training, Patient/family training, Equipment evaluation/education, Compensatory technique education  OT Frequency: 3 times per week  OT Discharge Recommendations: Moderate intensity level of continued care  Equipment Recommended upon Discharge: Wheeled walker  OT Recommended Transfer Status: Assist of 1  OT - OK to Discharge: Yes (When medicaly appropriate)  Treatment Interventions: ADL retraining, UE strengthening/ROM, Functional transfer training, Endurance training, Patient/family  training, Equipment evaluation/education, Compensatory technique education    Subjective   Current Problem:  1. Acute hypoxic respiratory failure (Multi)        2. Influenza A        3. Anemia, unspecified type        4. Elevated troponin          General:  General  Reason for Referral: Influenza A, elevated troponin, anemia  Referred By: Marcello  Past Medical History Relevant to Rehab: atrial fibrillation not on anticoagulation therapy, chronic kidney disease on hemodialysis, myelodysplastic syndrome with iron deficiency anemia followed by Dr. Ulloa, hypertension, diabetes, arthritis, coronary artery disease, diastolic heart failure, COPD, paroxysmal atrial fibrillation  Family/Caregiver Present: No  Co-Treatment: PT  Co-Treatment Reason: to optimize mobility and safety while focusing on discipline specific needs  Prior to Session Communication: Bedside nurse  Patient Position Received: Bed, 3 rail up, Alarm on  General Comment: Pt supine with HOB elevated; Cooperative with therapy.  Precautions:  Medical Precautions: Fall precautions, Oxygen therapy device and L/min     Date/Time Vitals Session Patient Position Pulse Resp SpO2 BP MAP (mmHg)    03/22/25 1200 --  --  --  --  84 %  --  --     03/22/25 1201 --  --  --  --  93 %  --  --     03/22/25 1300 --  --  71  18  92 %  144/70  96                 Pain:  Pain Assessment  Pain Assessment: 0-10  0-10 (Numeric) Pain Score: 0 - No pain    Objective   Cognition:  Overall Cognitive Status: Within Functional Limits  Orientation Level: Oriented X4           Home Living:  Type of Home: Condo  Lives With: Alone  Home Adaptive Equipment:  (Rollator)  Home Layout: One level, Laundry main level  Home Access: Level entry  Bathroom Shower/Tub: Walk-in shower  Bathroom Toilet: Standard  Bathroom Equipment: Shower chair with back (grab bar on outside of shower)  Home Living Comments: (+) life alert  Prior Function:  Level of [place]: Independent with ADLs and functional  transfers, Independent with homemaking with wheelchair  Receives Help From: Family, Friends  ADL Assistance: Independent  Homemaking Assistance: Independent  Ambulatory Assistance: Independent (MOD I with rollator; Typically uses HHA when in community)  Prior Function Comments: (+) fall  IADL History:  IADL Comments: (-) drive, friend assist with transportation  ADL:  Eating Assistance: Independent  Grooming Assistance: Stand by (Hand hygiene with wipe)  Bathing Assistance: Moderate (Anticipated)  UE Dressing Assistance: Minimal (Anticipated)  LE Dressing Assistance: Maximal (Donning brief seated on chair with assistance to lace BLE and parital assistance to don over hips in standing)  Toileting Assistance with Device:  (Toileting at bathroom level requiring assistance for clothing management and thoroughness of hygiene)  Activity Tolerance:  Endurance: Decreased tolerance for upright activites  Activity Tolerance Comments: Mild SOB following activity on 1L  Rate of Perceived Exertion (RPE): Modified RPE reported 10/10  Bed Mobility/Transfers: Bed Mobility  Bed Mobility: Yes  Bed Mobility 1  Bed Mobility 1: Supine to sitting  Level of Assistance 1: Moderate assistance, Minimal verbal cues    Transfers  Transfer: Yes  Transfer 1  Transfer From 1: Sit to, Stand to  Transfer to 1: Sit, Stand  Technique 1: Sit to stand, Stand to sit  Transfer Device 1: Walker  Transfer Level of Assistance 1: Minimum assistance  Transfers 2  Transfer From 2: Toilet to, Stand to  Transfer to 2: Stand, Toilet  Technique 2: Sit to stand, Stand to sit  Transfer Device 2: Walker  Transfer Level of Assistance 2: Moderate assistance  Trials/Comments 2: Plop descent to toilet, cuing for hand placement and use of grab bars      Functional Mobility:  Functional Mobility  Functional Mobility Performed: Yes  Functional Mobility 1  Surface 1: Level tile  Device 1: Rolling walker  Assistance 1: Minimum assistance (+1 for safety and equipment  management)  Comments 1: functional mobility for short household distance with FWW to/from bathroom, verbal cuing   for posture and walker management  Sitting Balance:  Static Sitting Balance  Static Sitting-Balance Support: Bilateral upper extremity supported  Static Sitting-Level of Assistance: Contact guard  Dynamic Sitting Balance  Dynamic Sitting-Balance Support: Bilateral upper extremity supported  Dynamic Sitting-Level of Assistance: Minimum assistance  Standing Balance:  Static Standing Balance  Static Standing-Balance Support: Bilateral upper extremity supported  Static Standing-Level of Assistance: Minimum assistance, Contact guard  Dynamic Standing Balance  Dynamic Standing-Balance Support: Bilateral upper extremity supported  Dynamic Standing-Level of Assistance: Minimum assistance   Modalities:     Vision:Vision - Basic Assessment  Current Vision: No visual deficits  Sensation:  Light Touch: No apparent deficits  Strength:  Strength Comments: BUE grossly 3+/5        Hand Function:  Gross Grasp: Functional  Coordination: Functional      Outcome Measures:[facility] Daily Activity  Putting on and taking off regular lower body clothing: A lot  Bathing (including washing, rinsing, drying): A lot  Putting on and taking off regular upper body clothing: A little  Toileting, which includes using toilet, bedpan or urinal: A lot  Taking care of personal grooming such as brushing teeth: A little  Eating Meals: A little  Daily Activity - Total Score: 15        Education Documentation  Precautions, taught by Alexandra Rodriguez OT at 3/22/2025  1:58 PM.  Learner: Patient  Readiness: Acceptance  Method: Explanation  Response: Verbalizes Understanding    ADL Training, taught by Alexandra Rodriguez OT at 3/22/2025  1:58 PM.  Learner: Patient  Readiness: Acceptance  Method: Explanation  Response: Verbalizes Understanding    Education Comments  No comments found.        OP EDUCATION:       Goals:  Encounter Problems       Encounter  Problems (Active)       ADLs       Patient will perform UB and LB bathing with modified independent level of assistance. (Progressing)       Start:  03/22/25    Expected End:  04/05/25            Patient with complete lower body dressing with modified independent level of assistance  with PRN adaptive equipment (Progressing)       Start:  03/22/25    Expected End:  04/05/25            Patient will complete toileting including hygiene clothing management/hygiene with modified independent level of assistance. (Progressing)       Start:  03/22/25    Expected End:  04/05/25               BALANCE       Pt will maintain dynamic standing balance during ADL task with modified independent level of assistance in order to demonstrate decreased risk of falling and improved postural control. (Progressing)       Start:  03/22/25    Expected End:  04/05/25               TRANSFERS       Patient will complete functional transfers with least restrictive device with modified independent level of assistance. (Progressing)       Start:  03/22/25    Expected End:  04/05/25

## 2025-03-22 NOTE — CARE PLAN
The patient's goals for the shift include      The clinical goals for the shift include pt will remain free from fall or injury throughout the shift    Over the shift, the patient did not make progress toward the following goals. Barriers to progression include  . Recommendations to address these barriers include    Problem: Pain - Adult  Goal: Verbalizes/displays adequate comfort level or baseline comfort level  Outcome: Progressing     Problem: Safety - Adult  Goal: Free from fall injury  Outcome: Progressing     Problem: Discharge Planning  Goal: Discharge to home or other facility with appropriate resources  Outcome: Progressing     Problem: Chronic Conditions and Co-morbidities  Goal: Patient's chronic conditions and co-morbidity symptoms are monitored and maintained or improved  Outcome: Progressing     Problem: Nutrition  Goal: Nutrient intake appropriate for maintaining nutritional needs  Outcome: Progressing     Problem: Diabetes  Goal: Achieve decreasing blood glucose levels by end of shift  Outcome: Progressing  Goal: Increase stability of blood glucose readings by end of shift  Outcome: Progressing  Goal: Decrease in ketones present in urine by end of shift  Outcome: Progressing  Goal: Maintain electrolyte levels within acceptable range throughout shift  Outcome: Progressing  Goal: Maintain glucose levels >70mg/dl to <250mg/dl throughout shift  Outcome: Progressing  Goal: No changes in neurological exam by end of shift  Outcome: Progressing  Goal: Learn about and adhere to nutrition recommendations by end of shift  Outcome: Progressing  Goal: Vital signs within normal range for age by end of shift  Outcome: Progressing  Goal: Increase self care and/or family involovement by end of shift  Outcome: Progressing  Goal: Receive DSME education by end of shift  Outcome: Progressing   .

## 2025-03-23 VITALS
BODY MASS INDEX: 29.14 KG/M2 | HEART RATE: 80 BPM | OXYGEN SATURATION: 96 % | WEIGHT: 154.32 LBS | HEIGHT: 61 IN | TEMPERATURE: 98.4 F | RESPIRATION RATE: 16 BRPM | SYSTOLIC BLOOD PRESSURE: 183 MMHG | DIASTOLIC BLOOD PRESSURE: 66 MMHG

## 2025-03-23 LAB
ALBUMIN SERPL BCP-MCNC: 3.2 G/DL (ref 3.4–5)
ALP SERPL-CCNC: 54 U/L (ref 33–136)
ALT SERPL W P-5'-P-CCNC: 56 U/L (ref 7–45)
ANION GAP SERPL CALC-SCNC: 17 MMOL/L (ref 10–20)
AST SERPL W P-5'-P-CCNC: 76 U/L (ref 9–39)
BACTERIA BLD CULT: NORMAL
BACTERIA BLD CULT: NORMAL
BILIRUB SERPL-MCNC: 0.5 MG/DL (ref 0–1.2)
BUN SERPL-MCNC: 29 MG/DL (ref 6–23)
CALCIUM SERPL-MCNC: 8.8 MG/DL (ref 8.6–10.3)
CHLORIDE SERPL-SCNC: 98 MMOL/L (ref 98–107)
CO2 SERPL-SCNC: 27 MMOL/L (ref 21–32)
CREAT SERPL-MCNC: 2.13 MG/DL (ref 0.5–1.05)
EGFRCR SERPLBLD CKD-EPI 2021: 23 ML/MIN/1.73M*2
ERYTHROCYTE [DISTWIDTH] IN BLOOD BY AUTOMATED COUNT: 19.9 % (ref 11.5–14.5)
GLUCOSE BLD MANUAL STRIP-MCNC: 101 MG/DL (ref 74–99)
GLUCOSE BLD MANUAL STRIP-MCNC: 126 MG/DL (ref 74–99)
GLUCOSE BLD MANUAL STRIP-MCNC: 182 MG/DL (ref 74–99)
GLUCOSE BLD MANUAL STRIP-MCNC: 209 MG/DL (ref 74–99)
GLUCOSE SERPL-MCNC: 110 MG/DL (ref 74–99)
HCT VFR BLD AUTO: 24.1 % (ref 36–46)
HGB BLD-MCNC: 7.9 G/DL (ref 12–16)
MCH RBC QN AUTO: 31.5 PG (ref 26–34)
MCHC RBC AUTO-ENTMCNC: 32.8 G/DL (ref 32–36)
MCV RBC AUTO: 96 FL (ref 80–100)
NRBC BLD-RTO: 0 /100 WBCS (ref 0–0)
PLATELET # BLD AUTO: 108 X10*3/UL (ref 150–450)
POTASSIUM SERPL-SCNC: 3.9 MMOL/L (ref 3.5–5.3)
PROT SERPL-MCNC: 5.6 G/DL (ref 6.4–8.2)
RBC # BLD AUTO: 2.51 X10*6/UL (ref 4–5.2)
SODIUM SERPL-SCNC: 138 MMOL/L (ref 136–145)
WBC # BLD AUTO: 1.6 X10*3/UL (ref 4.4–11.3)

## 2025-03-23 PROCEDURE — 82947 ASSAY GLUCOSE BLOOD QUANT: CPT

## 2025-03-23 PROCEDURE — 80053 COMPREHEN METABOLIC PANEL: CPT | Performed by: NURSE PRACTITIONER

## 2025-03-23 PROCEDURE — 2500000002 HC RX 250 W HCPCS SELF ADMINISTERED DRUGS (ALT 637 FOR MEDICARE OP, ALT 636 FOR OP/ED): Performed by: NURSE PRACTITIONER

## 2025-03-23 PROCEDURE — 2500000004 HC RX 250 GENERAL PHARMACY W/ HCPCS (ALT 636 FOR OP/ED): Performed by: NURSE PRACTITIONER

## 2025-03-23 PROCEDURE — 2500000001 HC RX 250 WO HCPCS SELF ADMINISTERED DRUGS (ALT 637 FOR MEDICARE OP): Performed by: NURSE PRACTITIONER

## 2025-03-23 PROCEDURE — 94640 AIRWAY INHALATION TREATMENT: CPT

## 2025-03-23 PROCEDURE — 2500000005 HC RX 250 GENERAL PHARMACY W/O HCPCS: Performed by: NURSE PRACTITIONER

## 2025-03-23 PROCEDURE — 2500000002 HC RX 250 W HCPCS SELF ADMINISTERED DRUGS (ALT 637 FOR MEDICARE OP, ALT 636 FOR OP/ED): Performed by: INTERNAL MEDICINE

## 2025-03-23 PROCEDURE — 94668 MNPJ CHEST WALL SBSQ: CPT

## 2025-03-23 PROCEDURE — 36415 COLL VENOUS BLD VENIPUNCTURE: CPT | Performed by: NURSE PRACTITIONER

## 2025-03-23 PROCEDURE — 99232 SBSQ HOSP IP/OBS MODERATE 35: CPT | Performed by: NURSE PRACTITIONER

## 2025-03-23 PROCEDURE — 1210000001 HC SEMI-PRIVATE ROOM DAILY

## 2025-03-23 PROCEDURE — 85027 COMPLETE CBC AUTOMATED: CPT | Performed by: NURSE PRACTITIONER

## 2025-03-23 RX ADMIN — LOSARTAN POTASSIUM 50 MG: 50 TABLET, FILM COATED ORAL at 08:20

## 2025-03-23 RX ADMIN — AMLODIPINE BESYLATE 10 MG: 10 TABLET ORAL at 08:16

## 2025-03-23 RX ADMIN — INSULIN LISPRO 4 UNITS: 100 INJECTION, SOLUTION INTRAVENOUS; SUBCUTANEOUS at 11:57

## 2025-03-23 RX ADMIN — GUAIFENESIN 1200 MG: 600 TABLET ORAL at 08:16

## 2025-03-23 RX ADMIN — DOXYCYCLINE 100 MG: 100 CAPSULE ORAL at 08:16

## 2025-03-23 RX ADMIN — ATORVASTATIN CALCIUM 10 MG: 10 TABLET, FILM COATED ORAL at 20:16

## 2025-03-23 RX ADMIN — CARVEDILOL 25 MG: 25 TABLET, FILM COATED ORAL at 20:16

## 2025-03-23 RX ADMIN — CARVEDILOL 25 MG: 25 TABLET, FILM COATED ORAL at 08:16

## 2025-03-23 RX ADMIN — ACETAMINOPHEN 650 MG: 325 TABLET ORAL at 20:16

## 2025-03-23 RX ADMIN — Medication 2000 UNITS: at 08:16

## 2025-03-23 RX ADMIN — DOXYCYCLINE 100 MG: 100 CAPSULE ORAL at 20:17

## 2025-03-23 RX ADMIN — CEFTRIAXONE 2 G: 2 INJECTION, SOLUTION INTRAVENOUS at 09:55

## 2025-03-23 RX ADMIN — Medication 1000 MCG: at 08:16

## 2025-03-23 RX ADMIN — PREGABALIN 75 MG: 75 CAPSULE ORAL at 08:16

## 2025-03-23 RX ADMIN — PANTOPRAZOLE SODIUM 40 MG: 40 TABLET, DELAYED RELEASE ORAL at 05:09

## 2025-03-23 RX ADMIN — ALLOPURINOL 100 MG: 100 TABLET ORAL at 20:17

## 2025-03-23 RX ADMIN — IPRATROPIUM BROMIDE AND ALBUTEROL SULFATE 3 ML: 2.5; .5 SOLUTION RESPIRATORY (INHALATION) at 07:37

## 2025-03-23 RX ADMIN — GUAIFENESIN 1200 MG: 600 TABLET ORAL at 20:17

## 2025-03-23 RX ADMIN — Medication 3 MG: at 20:16

## 2025-03-23 RX ADMIN — ALLOPURINOL 100 MG: 100 TABLET ORAL at 08:16

## 2025-03-23 RX ADMIN — IPRATROPIUM BROMIDE AND ALBUTEROL SULFATE 3 ML: 2.5; .5 SOLUTION RESPIRATORY (INHALATION) at 11:19

## 2025-03-23 RX ADMIN — IPRATROPIUM BROMIDE AND ALBUTEROL SULFATE 3 ML: 2.5; .5 SOLUTION RESPIRATORY (INHALATION) at 19:36

## 2025-03-23 ASSESSMENT — COGNITIVE AND FUNCTIONAL STATUS - GENERAL
MOVING TO AND FROM BED TO CHAIR: A LITTLE
DAILY ACTIVITIY SCORE: 20
CLIMB 3 TO 5 STEPS WITH RAILING: A LITTLE
WALKING IN HOSPITAL ROOM: A LITTLE
DRESSING REGULAR UPPER BODY CLOTHING: A LITTLE
MOVING FROM LYING ON BACK TO SITTING ON SIDE OF FLAT BED WITH BEDRAILS: A LITTLE
STANDING UP FROM CHAIR USING ARMS: A LITTLE
WALKING IN HOSPITAL ROOM: A LITTLE
DRESSING REGULAR LOWER BODY CLOTHING: A LITTLE
DRESSING REGULAR UPPER BODY CLOTHING: A LITTLE
DRESSING REGULAR LOWER BODY CLOTHING: A LITTLE
MOVING FROM LYING ON BACK TO SITTING ON SIDE OF FLAT BED WITH BEDRAILS: A LITTLE
TURNING FROM BACK TO SIDE WHILE IN FLAT BAD: A LITTLE
HELP NEEDED FOR BATHING: A LITTLE
MOVING TO AND FROM BED TO CHAIR: A LITTLE
CLIMB 3 TO 5 STEPS WITH RAILING: A LOT
DAILY ACTIVITIY SCORE: 19
PERSONAL GROOMING: A LITTLE
MOBILITY SCORE: 18
TOILETING: A LITTLE
MOBILITY SCORE: 17
TOILETING: A LITTLE
STANDING UP FROM CHAIR USING ARMS: A LITTLE
TURNING FROM BACK TO SIDE WHILE IN FLAT BAD: A LITTLE
HELP NEEDED FOR BATHING: A LITTLE

## 2025-03-23 ASSESSMENT — ENCOUNTER SYMPTOMS
FATIGUE: 1
WEAKNESS: 1
CHILLS: 1
COUGH: 1

## 2025-03-23 ASSESSMENT — PAIN SCALES - GENERAL
PAINLEVEL_OUTOF10: 2
PAINLEVEL_OUTOF10: 8

## 2025-03-23 ASSESSMENT — PAIN - FUNCTIONAL ASSESSMENT
PAIN_FUNCTIONAL_ASSESSMENT: 0-10
PAIN_FUNCTIONAL_ASSESSMENT: 0-10

## 2025-03-23 NOTE — PROGRESS NOTES
"But was able to get one placed     Nephrology Progress Note      Nephrology following for ESRD.   Events over night: none    Patient is feeling better,  Had difficulty with IV access by staff from ER      /69 (BP Location: Right arm, Patient Position: Lying)   Pulse 69   Temp 36.7 °C (98.1 °F) (Temporal)   Resp 16   Ht 1.549 m (5' 1\")   Wt 70 kg (154 lb 5.2 oz)   SpO2 96%   BMI 29.16 kg/m²     Input / Output:  24 HR:   Intake/Output Summary (Last 24 hours) at 3/23/2025 1659  Last data filed at 3/23/2025 0611  Gross per 24 hour   Intake 1960 ml   Output --   Net 1960 ml       Physical Exam   Alert and oriented x 3  Neck: no JVD  CV: RRR  Lungs: CTA bilaterally  Abd: soft, NT, ND   Ext: no lower extremity edema    Scheduled medications  allopurinol, 100 mg, oral, BID  amLODIPine, 10 mg, oral, Daily  atorvastatin, 10 mg, oral, Nightly  carvedilol, 25 mg, oral, BID  cefTRIAXone, 2 g, intravenous, q24h  cholecalciferol, 2,000 Units, oral, Daily  cyanocobalamin, 1,000 mcg, oral, Daily  doxycylcine, 100 mg, oral, q12h JOSE DE JESUS  epoetin alejandra or biosimilar, 28,000 Units, subcutaneous, Once per day on Monday Wednesday Friday  guaiFENesin, 1,200 mg, oral, BID  insulin lispro, 0-10 Units, subcutaneous, TID AC  ipratropium-albuteroL, 3 mL, nebulization, TID  losartan, 50 mg, oral, Daily  oseltamivir, 30 mg, oral, Once per day on Monday Wednesday Friday  pantoprazole, 40 mg, oral, Daily before breakfast   Or  pantoprazole, 40 mg, intravenous, Daily before breakfast  [Held by provider] pioglitazone, 15 mg, oral, Daily  polyethylene glycol, 17 g, oral, Daily  pregabalin, 75 mg, oral, Daily      Continuous medications     PRN medications  PRN medications: acetaminophen, benzocaine-menthol, bisacodyl, dextrose, dextrose, glucagon, glucagon, ipratropium-albuteroL, melatonin, ondansetron ODT **OR** ondansetron, oxygen   Results from last 7 days   Lab Units 03/23/25  0813   SODIUM mmol/L 138   POTASSIUM mmol/L 3.9   CHLORIDE " mmol/L 98   CO2 mmol/L 27   BUN mg/dL 29*   CREATININE mg/dL 2.13*   CALCIUM mg/dL 8.8   PROTEIN TOTAL g/dL 5.6*   BILIRUBIN TOTAL mg/dL 0.5   ALK PHOS U/L 54   ALT U/L 56*   AST U/L 76*   GLUCOSE mg/dL 110*           Results from last 7 days   Lab Units 03/23/25  0813 03/22/25  0451 03/21/25  0402   WBC AUTO x10*3/uL 1.6* 2.2* 4.1*   HEMOGLOBIN g/dL 7.9* 7.0* 7.5*   HEMATOCRIT % 24.1* 21.0* 22.6*   PLATELETS AUTO x10*3/uL 108* 105* 145*      Results from last 7 days   Lab Units 03/23/25  0813 03/22/25  0451 03/21/25  0402   SODIUM mmol/L 138 139 135*   POTASSIUM mmol/L 3.9 3.9 3.7   CHLORIDE mmol/L 98 98 102   CO2 mmol/L 27 28 29   BUN mg/dL 29* 19 29*   CREATININE mg/dL 2.13* 1.74* 2.34*   GLUCOSE mg/dL 110* 89 120*   CALCIUM mg/dL 8.8 8.2* 8.5*       Assessment & Plan:      Patient is 79 y.o. female history of DM 2, HTN, ESRD, MDS who is admitted to hospital for influenza, fall. Nephrology consulted in view of ESRD.       ESRD  -HD F [facility]  -Access is right IJ TDC     Anemia of ESRD and MDS  -Has pancytopenia  -On high-dose MARILYNN with dialysis, will continue here  -PRBC's today. Dr Medina following as well     CKD-MBD  -Not currently on binder, VDRL or calcium emetic  HTN  Acute hypoxic respiratory failure  Influenza A        Recommendations:   -Next hemodialysis Monday    -Continue Epogen 28,000 units Monday Wednesday Friday      Please message me through [employer] chat with any questions or concerns.     Pinky Christie DO  3/23/2025  4:59 PM     [facility]    [address], [address]   [place] [address]  Office: [phone]

## 2025-03-23 NOTE — PROGRESS NOTES
Tana Hargrove is a 80 y.o. female on day 4 of admission presenting with Acute hypoxic respiratory failure (Multi).      Subjective     Tana Hargrove is a 79 y.o. female with PMHx s/f atrial fibrillation not on anticoagulation therapy, chronic kidney disease on hemodialysis with Dr. Avelar, myelodysplastic syndrome with iron deficiency anemia followed by Dr. Ulloa, hypertension, diabetes, arthritis, coronary artery disease, diastolic heart failure, COPD, paroxysmal atrial fibrillation not on anticoagulation therapy no Watchman device secondary to blood disorder who presented to the hospital complaining of generalized weakness over the last several days.  The patient reports that her hemoglobin was only 6 she did not have her last dialysis session.  She has been having generalized weakness she did have a fall landing down onto the ground without hitting her head.  Patient also has cough shortness of breath.  She is very chilled right now and shivering in the emergency department.  She is not having any abdominal pain nausea vomiting diarrhea no urinary symptoms.  She does have other family members who are having similar symptoms.  In the emergency department patient tested positive for influenza.  Her blood work showed hemoglobin of 6.7.  The patient was subsequently discussed with the ED provider the plan is to admit the patient to continue treatment for influenza and to obtain hematology consultation and consider transfusion for her anemia with hemoglobin of 6.7 she does have a chronically elevated ferritin level due to many transfusions.  Due to the comorbidities present with the blood dyscrasia and underlying COPD with superimposed influenza patient will likely need more than 2 midnights hospital stay for full evaluation and workup.        ED Course (Summary - please note all labs, imaging studies, and interventions noted below have been personally reviewed and/or interpreted on day of admission):   Vitals on  presentation: T 37.6 °C (99.7 °F)  HR 98  /73  RR (!) 22  O2 (!) 87 % None (Room air)  Labs:   CBC with WBC 4.3, Hgb 6.7, Plts 206.   CMP with glucose 179, Na 137, K 4.4, BUN 30, sCr 2.42, alk phos 87, ALT 16, AST 15, bilirubin 1.0. Magnesium 1.67.   . Trop 17.   Lactate 1.1  EKG: The EKG shows normal sinus rhythm no acute ST wave segment elevation to suggest ischemia no intraventricular conduction delay.  Imaging: Chest x-ray shows unchanged stable right hemidiaphragmatic elevation no acute pulmonary findings  Interventions: Patient was given nebulizer treatments and referred for admission          3.20.25 The patient's hemoglobin dropped to 5.4 she has since been transfused w 2 units, she remains weak, short of breath. Pt with fever most of the day and night yesterday, but none today.     3.21.25 pt with persistent weakness, hbg post 2 uinits RBC 7.5. Will add rocephin/doxycycline check procal level, cxr as patient is at risk for secondary bacterial pneumonia.     3.22.24 patient started on Rocephin and doxycycline yesterday no fevers last 24 hours.  She is extremely weak but does feel subjectively better.  Unfortunately patient has worsening pancytopenia.     3.23.25 chest x-ray with possible retrocardiac infiltrate, patient previously started on Rocephin and doxycycline with improvement in fevers.  Patient is subjectively feeling stronger breathing is better subjectively.  Patient does remain relatively frail however.  There was difficulty obtaining IV access for about 18 hours ultimately  Placed in the left antecubital.  Discussed the situation with nephrology nephrology notes the patient is amenable to getting an AV fistula in the future and would prefer to avoid any long-term access via the arms.         Review of Systems   Constitutional:  Positive for chills and fatigue.   Respiratory:  Positive for cough.    Neurological:  Positive for weakness.          Objective     Last Recorded  Vitals  /69 (BP Location: Right arm, Patient Position: Lying)   Pulse 69   Temp 36.7 °C (98.1 °F) (Temporal)   Resp 16   Wt 70 kg (154 lb 5.2 oz)   SpO2 96%     Image Results  ECG 12 lead    Result Date: 3/19/2025  Sinus rhythm Low voltage, precordial leads See ED provider note for full interpretation and clinical correlation Confirmed by Terri Bermudez ([id number]) on 3/19/2025 3:46:43 PM    XR chest 2 views    Result Date: 3/19/2025  Interpreted By:  Pauline Adame, STUDY: XR CHEST 2 VIEWS;  3/19/2025 9:16 am   INDICATION: Signs/Symptoms:fever and cough.     COMPARISON: 09/20/2024   ACCESSION NUMBER(S): [id number]   ORDERING CLINICIAN: MERARY HERNANDEZ   FINDINGS: AP upright and lateral views were obtained with the patient sitting.   Right internal jugular dual lumen dialysis catheter tip projects in the right atrium.   CARDIOMEDIASTINAL SILHOUETTE: Cardiac silhouette is enlarged and appears larger than on the previous exam. This may be accentuated by the very shallow inspiration.   LUNGS: Right hemidiaphragm is elevated relative to the left hemidiaphragm, not significantly changed from the prior study. Overall the lung volumes are shallow. There is no focal consolidation noted. No pleural effusion is identified.   ABDOMEN: No remarkable upper abdominal findings.   BONES: No acute osseous changes.       1.  Shallow inspiration with no acute pulmonary finding 2. Unchanged elevation of the right hemidiaphragm 3. Cardiomegaly appears greater than on the prior study       MACRO: None   Signed by: Pauline Adame 3/19/2025 9:51 AM Dictation workstation:   [id number]       Lab Results  Results for orders placed or performed during the hospital encounter of 03/19/25 (from the past 24 hours)   POCT GLUCOSE   Result Value Ref Range    POCT Glucose 93 74 - 99 mg/dL   POCT GLUCOSE   Result Value Ref Range    POCT Glucose 156 (H) 74 - 99 mg/dL   POCT GLUCOSE   Result Value Ref Range    POCT Glucose 101 (H) 74  - 99 mg/dL   Comprehensive Metabolic Panel   Result Value Ref Range    Glucose 110 (H) 74 - 99 mg/dL    Sodium 138 136 - 145 mmol/L    Potassium 3.9 3.5 - 5.3 mmol/L    Chloride 98 98 - 107 mmol/L    Bicarbonate 27 21 - 32 mmol/L    Anion Gap 17 10 - 20 mmol/L    Urea Nitrogen 29 (H) 6 - 23 mg/dL    Creatinine 2.13 (H) 0.50 - 1.05 mg/dL    eGFR 23 (L) >60 mL/min/1.73m*2    Calcium 8.8 8.6 - 10.3 mg/dL    Albumin 3.2 (L) 3.4 - 5.0 g/dL    Alkaline Phosphatase 54 33 - 136 U/L    Total Protein 5.6 (L) 6.4 - 8.2 g/dL    AST 76 (H) 9 - 39 U/L    Bilirubin, Total 0.5 0.0 - 1.2 mg/dL    ALT 56 (H) 7 - 45 U/L   CBC   Result Value Ref Range    WBC 1.6 (L) 4.4 - 11.3 x10*3/uL    nRBC 0.0 0.0 - 0.0 /100 WBCs    RBC 2.51 (L) 4.00 - 5.20 x10*6/uL    Hemoglobin 7.9 (L) 12.0 - 16.0 g/dL    Hematocrit 24.1 (L) 36.0 - 46.0 %    MCV 96 80 - 100 fL    MCH 31.5 26.0 - 34.0 pg    MCHC 32.8 32.0 - 36.0 g/dL    RDW 19.9 (H) 11.5 - 14.5 %    Platelets 108 (L) 150 - 450 x10*3/uL   POCT GLUCOSE   Result Value Ref Range    POCT Glucose 209 (H) 74 - 99 mg/dL     *Note: Due to a large number of results and/or encounters for the requested time period, some results have not been displayed. A complete set of results can be found in Results Review.        Medications  Scheduled medications:  allopurinol, 100 mg, oral, BID  amLODIPine, 10 mg, oral, Daily  atorvastatin, 10 mg, oral, Nightly  carvedilol, 25 mg, oral, BID  cefTRIAXone, 2 g, intravenous, q24h  cholecalciferol, 2,000 Units, oral, Daily  cyanocobalamin, 1,000 mcg, oral, Daily  doxycylcine, 100 mg, oral, q12h JOSE DE JESUS  epoetin alejandra or biosimilar, 28,000 Units, subcutaneous, Once per day on Monday Wednesday Friday  guaiFENesin, 1,200 mg, oral, BID  insulin lispro, 0-10 Units, subcutaneous, TID AC  ipratropium-albuteroL, 3 mL, nebulization, TID  losartan, 50 mg, oral, Daily  oseltamivir, 30 mg, oral, Once per day on Monday Wednesday Friday  pantoprazole, 40 mg, oral, Daily before breakfast    Or  pantoprazole, 40 mg, intravenous, Daily before breakfast  [Held by provider] pioglitazone, 15 mg, oral, Daily  polyethylene glycol, 17 g, oral, Daily  pregabalin, 75 mg, oral, Daily      Continuous medications:     PRN medications:  PRN medications: acetaminophen, benzocaine-menthol, bisacodyl, dextrose, dextrose, glucagon, glucagon, ipratropium-albuteroL, melatonin, ondansetron ODT **OR** ondansetron, oxygen     Physical Exam  Vitals reviewed.   Constitutional:       General: She is not in acute distress.  HENT:      Head: Normocephalic and atraumatic.      Right Ear: External ear normal.      Left Ear: External ear normal.      Nose: Nose normal.      Mouth/Throat:      Mouth: Mucous membranes are moist.      Pharynx: Oropharynx is clear.   Eyes:      Extraocular Movements: Extraocular movements intact.      Conjunctiva/sclera: Conjunctivae normal.      Pupils: Pupils are equal, round, and reactive to light.   Cardiovascular:      Rate and Rhythm: Normal rate and regular rhythm.      Pulses: Normal pulses.      Heart sounds: Normal heart sounds.   Pulmonary:      Effort: Pulmonary effort is normal. No respiratory distress.      Breath sounds: Wheezing and rhonchi present. No rales.   Chest:      Chest wall: No tenderness.   Abdominal:      General: Bowel sounds are normal. There is no distension.      Palpations: Abdomen is soft. There is no mass.      Tenderness: There is no abdominal tenderness. There is no rebound.   Musculoskeletal:         General: No swelling or deformity. Normal range of motion.      Cervical back: Normal range of motion.   Skin:     General: Skin is warm and dry.      Capillary Refill: Capillary refill takes less than 2 seconds.   Neurological:      General: No focal deficit present.      Mental Status: She is alert and oriented to person, place, and time.      Comments: Slow movements, slow thought processessing   Psychiatric:         Mood and Affect: Mood normal.         Behavior:  Behavior normal.                  Assessment/Plan      79 y.o. female with PMHx s/f atrial fibrillation not on anticoagulation therapy, chronic kidney disease on hemodialysis with Dr. Avelar, myelodysplastic syndrome with iron deficiency anemia followed by Dr. Ulloa, hypertension, diabetes, arthritis, coronary artery disease, diastolic heart failure, COPD, paroxysmal atrial fibrillation not on anticoagulation therapy no Watchman device secondary to blood disorder who presented to the hospital complaining of generalized weakness over the last several days.       Acute hypoxic respiratory failure secondary to history of COPD and superimposed influenza A  Patient is continued on Tamiflu renal dosing   We will continue the patient on nebulizer treatments and supplemental oxygen  Mucinex and flutter valve to improve expectoration  The patient is improving    Secondary bacterial pneumonia  Patient is improving with antibiotics  Retrocardiac infiltrate noted  Will continue rocephin and oral doxycycline       Myelodysplastic syndrome with chronic iron deficiency anemia and pancytopenia  Ferritin level is elevated  Hemoglobin is improving platelets are now stable leukopenia is persistent  Follow-up hematology recommendations  GI prophylaxis w pantoprazole   Will obtain CBC with differential patient may need neutropenic precautions     Chronic kidney disease stage V on hemodialysis  Typically outpatient dialysis on Tuesdays Thursdays and Saturday   Next hemodialysis due tomorrow     Coronary artery disease, diastolic CHF, atrial fibrillation  Patient will be resumed on her normal cardiac medications including her carvedilol amlodipine  She is not complaining of any chest pain her heart rate is controlled  The patient is not on anticoagulation therapy and has not had a Watchman placed due to her severe blood dyscrasias and inability to tolerate blood thinners     Diabetes type 2  Will continue    VTE prophylaxis  No lovenox  /Heparin due to thrombocytopenia               Code Status: Full Code             Please see orders for more complete plan    Jose Miguel Armendariz, APRN-CNP

## 2025-03-23 NOTE — CARE PLAN
The patient's goals for the shift include      The clinical goals for the shift include pt will remain free from fall or injury throughout the shift    Over the shift, the patient did not make progress toward the following goals. Barriers to progression include . Recommendations to address these barriers include   Problem: Safety - Adult  Goal: Free from fall injury  Outcome: Progressing     Problem: Discharge Planning  Goal: Discharge to home or other facility with appropriate resources  Outcome: Progressing     Problem: Chronic Conditions and Co-morbidities  Goal: Patient's chronic conditions and co-morbidity symptoms are monitored and maintained or improved  Outcome: Progressing     Problem: Nutrition  Goal: Nutrient intake appropriate for maintaining nutritional needs  Outcome: Progressing     Problem: Diabetes  Goal: Achieve decreasing blood glucose levels by end of shift  Outcome: Progressing  Goal: Increase stability of blood glucose readings by end of shift  Outcome: Progressing  Goal: Decrease in ketones present in urine by end of shift  Outcome: Progressing  Goal: Maintain electrolyte levels within acceptable range throughout shift  Outcome: Progressing  Goal: Maintain glucose levels >70mg/dl to <250mg/dl throughout shift  Outcome: Progressing  Goal: No changes in neurological exam by end of shift  Outcome: Progressing  Goal: Learn about and adhere to nutrition recommendations by end of shift  Outcome: Progressing  Goal: Vital signs within normal range for age by end of shift  Outcome: Progressing  Goal: Increase self care and/or family involovement by end of shift  Outcome: Progressing  Goal: Receive DSME education by end of shift  Outcome: Progressing   .

## 2025-03-24 ENCOUNTER — APPOINTMENT (OUTPATIENT)
Dept: DIALYSIS | Facility: HOSPITAL | Age: 80
End: 2025-03-24
Payer: MEDICARE

## 2025-03-24 LAB
BACTERIA BLD CULT: NORMAL
BACTERIA BLD CULT: NORMAL
BASOPHILS # BLD AUTO: 0.01 X10*3/UL (ref 0–0.1)
BASOPHILS NFR BLD AUTO: 0.6 %
EOSINOPHIL # BLD AUTO: 0.1 X10*3/UL (ref 0–0.4)
EOSINOPHIL NFR BLD AUTO: 6 %
ERYTHROCYTE [DISTWIDTH] IN BLOOD BY AUTOMATED COUNT: 19.4 % (ref 11.5–14.5)
GLUCOSE BLD MANUAL STRIP-MCNC: 121 MG/DL (ref 74–99)
GLUCOSE BLD MANUAL STRIP-MCNC: 149 MG/DL (ref 74–99)
GLUCOSE BLD MANUAL STRIP-MCNC: 152 MG/DL (ref 74–99)
GLUCOSE BLD MANUAL STRIP-MCNC: 174 MG/DL (ref 74–99)
HCT VFR BLD AUTO: 24.9 % (ref 36–46)
HGB BLD-MCNC: 8.1 G/DL (ref 12–16)
IMM GRANULOCYTES # BLD AUTO: 0.01 X10*3/UL (ref 0–0.5)
IMM GRANULOCYTES NFR BLD AUTO: 0.6 % (ref 0–0.9)
LYMPHOCYTES # BLD AUTO: 1.06 X10*3/UL (ref 0.8–3)
LYMPHOCYTES NFR BLD AUTO: 63.1 %
MCH RBC QN AUTO: 31.3 PG (ref 26–34)
MCHC RBC AUTO-ENTMCNC: 32.5 G/DL (ref 32–36)
MCV RBC AUTO: 96 FL (ref 80–100)
MONOCYTES # BLD AUTO: 0.12 X10*3/UL (ref 0.05–0.8)
MONOCYTES NFR BLD AUTO: 7.1 %
NEUTROPHILS # BLD AUTO: 0.38 X10*3/UL (ref 1.6–5.5)
NEUTROPHILS NFR BLD AUTO: 22.6 %
NRBC BLD-RTO: 0 /100 WBCS (ref 0–0)
PLATELET # BLD AUTO: 99 X10*3/UL (ref 150–450)
RBC # BLD AUTO: 2.59 X10*6/UL (ref 4–5.2)
RBC MORPH BLD: NORMAL
WBC # BLD AUTO: 1.7 X10*3/UL (ref 4.4–11.3)

## 2025-03-24 PROCEDURE — 2500000002 HC RX 250 W HCPCS SELF ADMINISTERED DRUGS (ALT 637 FOR MEDICARE OP, ALT 636 FOR OP/ED): Performed by: NURSE PRACTITIONER

## 2025-03-24 PROCEDURE — 8010000001 HC DIALYSIS - HEMODIALYSIS PER DAY

## 2025-03-24 PROCEDURE — 2500000001 HC RX 250 WO HCPCS SELF ADMINISTERED DRUGS (ALT 637 FOR MEDICARE OP): Performed by: NURSE PRACTITIONER

## 2025-03-24 PROCEDURE — 85025 COMPLETE CBC W/AUTO DIFF WBC: CPT | Performed by: NURSE PRACTITIONER

## 2025-03-24 PROCEDURE — 1210000001 HC SEMI-PRIVATE ROOM DAILY

## 2025-03-24 PROCEDURE — 2500000004 HC RX 250 GENERAL PHARMACY W/ HCPCS (ALT 636 FOR OP/ED): Performed by: NURSE PRACTITIONER

## 2025-03-24 PROCEDURE — 6350000001 HC RX 635 EPOETIN >10,000 UNITS: Performed by: INTERNAL MEDICINE

## 2025-03-24 PROCEDURE — 82947 ASSAY GLUCOSE BLOOD QUANT: CPT

## 2025-03-24 PROCEDURE — 99231 SBSQ HOSP IP/OBS SF/LOW 25: CPT | Performed by: INTERNAL MEDICINE

## 2025-03-24 PROCEDURE — 36415 COLL VENOUS BLD VENIPUNCTURE: CPT | Performed by: NURSE PRACTITIONER

## 2025-03-24 PROCEDURE — 2500000005 HC RX 250 GENERAL PHARMACY W/O HCPCS: Performed by: NURSE PRACTITIONER

## 2025-03-24 PROCEDURE — 99233 SBSQ HOSP IP/OBS HIGH 50: CPT | Performed by: NURSE PRACTITIONER

## 2025-03-24 RX ORDER — OSELTAMIVIR PHOSPHATE 30 MG/1
30 CAPSULE ORAL 3 TIMES WEEKLY
Qty: 3 CAPSULE | Refills: 0 | Status: SHIPPED | OUTPATIENT
Start: 2025-03-26 | End: 2025-04-01 | Stop reason: ALTCHOICE

## 2025-03-24 RX ORDER — DOXYCYCLINE 100 MG/1
100 CAPSULE ORAL EVERY 12 HOURS SCHEDULED
Qty: 10 CAPSULE | Refills: 0 | Status: SHIPPED | OUTPATIENT
Start: 2025-03-24 | End: 2025-04-01 | Stop reason: WASHOUT

## 2025-03-24 RX ADMIN — GUAIFENESIN 1200 MG: 600 TABLET ORAL at 12:58

## 2025-03-24 RX ADMIN — GUAIFENESIN 1200 MG: 600 TABLET ORAL at 20:00

## 2025-03-24 RX ADMIN — Medication 2000 UNITS: at 12:55

## 2025-03-24 RX ADMIN — PANTOPRAZOLE SODIUM 40 MG: 40 TABLET, DELAYED RELEASE ORAL at 06:36

## 2025-03-24 RX ADMIN — ATORVASTATIN CALCIUM 10 MG: 10 TABLET, FILM COATED ORAL at 20:00

## 2025-03-24 RX ADMIN — ALLOPURINOL 100 MG: 100 TABLET ORAL at 20:00

## 2025-03-24 RX ADMIN — CARVEDILOL 25 MG: 25 TABLET, FILM COATED ORAL at 12:55

## 2025-03-24 RX ADMIN — CEFTRIAXONE 2 G: 2 INJECTION, SOLUTION INTRAVENOUS at 12:55

## 2025-03-24 RX ADMIN — EPOETIN ALFA-EPBX 28000 UNITS: 20000 INJECTION, SOLUTION INTRAVENOUS; SUBCUTANEOUS at 15:25

## 2025-03-24 RX ADMIN — ACETAMINOPHEN 650 MG: 325 TABLET ORAL at 20:00

## 2025-03-24 RX ADMIN — DOXYCYCLINE 100 MG: 100 CAPSULE ORAL at 20:00

## 2025-03-24 RX ADMIN — INSULIN LISPRO 2 UNITS: 100 INJECTION, SOLUTION INTRAVENOUS; SUBCUTANEOUS at 16:53

## 2025-03-24 RX ADMIN — AMLODIPINE BESYLATE 10 MG: 10 TABLET ORAL at 12:55

## 2025-03-24 RX ADMIN — DOXYCYCLINE 100 MG: 100 CAPSULE ORAL at 12:55

## 2025-03-24 RX ADMIN — CARVEDILOL 25 MG: 25 TABLET, FILM COATED ORAL at 20:00

## 2025-03-24 RX ADMIN — Medication 3 MG: at 20:00

## 2025-03-24 RX ADMIN — OSELTAMAVIR PHOSPHATE 30 MG: 30 CAPSULE ORAL at 12:55

## 2025-03-24 RX ADMIN — ALLOPURINOL 100 MG: 100 TABLET ORAL at 12:55

## 2025-03-24 RX ADMIN — Medication 1000 MCG: at 12:55

## 2025-03-24 RX ADMIN — LOSARTAN POTASSIUM 50 MG: 50 TABLET, FILM COATED ORAL at 12:55

## 2025-03-24 RX ADMIN — PREGABALIN 75 MG: 75 CAPSULE ORAL at 12:55

## 2025-03-24 ASSESSMENT — COGNITIVE AND FUNCTIONAL STATUS - GENERAL
MOVING FROM LYING ON BACK TO SITTING ON SIDE OF FLAT BED WITH BEDRAILS: A LITTLE
DAILY ACTIVITIY SCORE: 20
HELP NEEDED FOR BATHING: A LITTLE
WALKING IN HOSPITAL ROOM: A LITTLE
CLIMB 3 TO 5 STEPS WITH RAILING: A LITTLE
MOVING FROM LYING ON BACK TO SITTING ON SIDE OF FLAT BED WITH BEDRAILS: A LITTLE
STANDING UP FROM CHAIR USING ARMS: A LITTLE
HELP NEEDED FOR BATHING: A LITTLE
TURNING FROM BACK TO SIDE WHILE IN FLAT BAD: A LITTLE
TURNING FROM BACK TO SIDE WHILE IN FLAT BAD: A LITTLE
DRESSING REGULAR LOWER BODY CLOTHING: A LITTLE
TOILETING: A LITTLE
STANDING UP FROM CHAIR USING ARMS: A LITTLE
MOVING TO AND FROM BED TO CHAIR: A LITTLE
DAILY ACTIVITIY SCORE: 20
MOVING TO AND FROM BED TO CHAIR: A LITTLE
WALKING IN HOSPITAL ROOM: A LOT
TOILETING: A LITTLE
DRESSING REGULAR LOWER BODY CLOTHING: A LITTLE
MOBILITY SCORE: 18
CLIMB 3 TO 5 STEPS WITH RAILING: A LOT
PERSONAL GROOMING: A LITTLE
DRESSING REGULAR UPPER BODY CLOTHING: A LITTLE
MOBILITY SCORE: 16

## 2025-03-24 ASSESSMENT — PAIN - FUNCTIONAL ASSESSMENT
PAIN_FUNCTIONAL_ASSESSMENT: 0-10
PAIN_FUNCTIONAL_ASSESSMENT: 0-10

## 2025-03-24 ASSESSMENT — PAIN SCALES - GENERAL
PAINLEVEL_OUTOF10: 5 - MODERATE PAIN
PAINLEVEL_OUTOF10: 0 - NO PAIN
PAINLEVEL_OUTOF10: 0 - NO PAIN

## 2025-03-24 NOTE — PRE-PROCEDURE NOTE
Report from Sending RN:    Report From: Kalpana Barrera RN  Recent Surgery of Procedure: No  Baseline Level of Consciousness (LOC): x4  Oxygen Use: Yes, 3L  Type: NC  Diabetic: Yes  Last BP Med Given Day of Dialysis: See MAR  Last Pain Med Given: See MAR  Lab Tests to be Obtained with Dialysis: No  Blood Transfusion to be Given During Dialysis: No  Available IV Access: Yes  Medications to be Administered During Dialysis: No  Continuous IV Infusion Running: No  Restraints on Currently or in the Last 24 Hours: No  Hand-Off Communication: Stable aand ready for HD  Dialysis Catheter Dressing: Will check prior to HD

## 2025-03-24 NOTE — PROGRESS NOTES
Occupational Therapy                 Therapy Communication Note    Patient Name: Tana Hargrove  MRN: [id number]  Department: [facility] 2 E  Room: [address] [place], [place] [address]  Today's Date: 3/24/2025     Discipline: Occupational Therapy    OT Missed Visit: Yes     Missed Visit Reason: Missed Visit Reason: Patient in a medical procedure (OT tx attempted. pt currently in dialysis.)    Missed Time: Attempt    Comment:

## 2025-03-24 NOTE — PROGRESS NOTES
03/24/25 1427   Discharge Planning   Living Arrangements Alone   Support Systems Family members;Children   Assistance Needed ADL's, IADL's   Type of Residence Private residence   Home or Post Acute Services None   Expected Discharge Disposition Home H   Does the patient need discharge transport arranged? No   RoundTrip coordination needed? No   Patient Choice   Provider Choice list and CMS website (https://medicare.gov/care-compare#search) for post-acute Quality and Resource Measure Data were provided and reviewed with: Patient   Stroke Family Assessment   Stroke Family Assessment Needed No   Intensity of Service   Intensity of Service 0-30 min     Discharge planning assessment completed with patient. She lives at home independently, reports she was doing well prior to candido the flu. Patient follows with PCP Dr Higgins in [place]. She is currently recommended for MOD intensity therapies at discharge. She is declining SNF and is agreeable to HHC. Patient was provided with a CAREPORT list of skilled HHC agencies within her insurance network that service her home zip code and displays CMS star ratings. Patient selected [facility] as AOC. Referral submitted for review.

## 2025-03-24 NOTE — POST-PROCEDURE NOTE
Report to Receiving RN:    Report To: Kalpana Barrera RN  Time Report Called: 1222  Hand-Off Communication:   Pt tolerated tx we... Removed 1L   Post vitals: 143/74 (74) - 97.4*F - 67.7kg  Complications During Treatment: No  Ultrafiltration Treatment: Yes, IHD Removefd 1L  Medications Administered During Dialysis: No  Blood Products Administered During Dialysis: No  Labs Sent During Dialysis: No  Heparin Drip Rate Changes: N/A  Dialysis Catheter Dressing: Clean, dry, intact  Last Dressing Change: 3/14/2025    Electronic Signatures:  Jimmy Waller OCDT    Last Updated: 12:22 PM by JIMMY WALLER

## 2025-03-24 NOTE — CARE PLAN
The patient's goals for the shift include      The clinical goals for the shift include Patient safety throughout the shift.      Problem: Safety - Adult  Goal: Free from fall injury  Outcome: Progressing     Problem: Discharge Planning  Goal: Discharge to home or other facility with appropriate resources  Outcome: Progressing     Problem: Nutrition  Goal: Nutrient intake appropriate for maintaining nutritional needs  Outcome: Progressing

## 2025-03-24 NOTE — CARE PLAN
Problem: Safety - Adult  Goal: Free from fall injury  Outcome: Progressing     Problem: Discharge Planning  Goal: Discharge to home or other facility with appropriate resources  Outcome: Progressing     Problem: Chronic Conditions and Co-morbidities  Goal: Patient's chronic conditions and co-morbidity symptoms are monitored and maintained or improved  Outcome: Progressing     Problem: Nutrition  Goal: Nutrient intake appropriate for maintaining nutritional needs  Outcome: Progressing     Problem: Diabetes  Goal: Achieve decreasing blood glucose levels by end of shift  Outcome: Progressing  Goal: Increase stability of blood glucose readings by end of shift  Outcome: Progressing  Goal: Decrease in ketones present in urine by end of shift  Outcome: Progressing  Goal: Maintain electrolyte levels within acceptable range throughout shift  Outcome: Progressing  Goal: Maintain glucose levels >70mg/dl to <250mg/dl throughout shift  Outcome: Progressing  Goal: No changes in neurological exam by end of shift  Outcome: Progressing  Goal: Learn about and adhere to nutrition recommendations by end of shift  Outcome: Progressing  Goal: Vital signs within normal range for age by end of shift  Outcome: Progressing  Goal: Increase self care and/or family involovement by end of shift  Outcome: Progressing  Goal: Receive DSME education by end of shift  Outcome: Progressing     Problem: Pain  Goal: Takes deep breaths with improved pain control throughout the shift  Outcome: Progressing  Goal: Turns in bed with improved pain control throughout the shift  Outcome: Progressing  Goal: Walks with improved pain control throughout the shift  Outcome: Progressing  Goal: Performs ADL's with improved pain control throughout shift  Outcome: Progressing  Goal: Participates in PT with improved pain control throughout the shift  Outcome: Progressing  Goal: Free from opioid side effects throughout the shift  Outcome: Progressing  Goal: Free from  acute confusion related to pain meds throughout the shift  Outcome: Progressing     Problem: Skin  Goal: Decreased wound size/increased tissue granulation at next dressing change  Outcome: Progressing  Flowsheets (Taken 3/24/2025 0715)  Decreased wound size/increased tissue granulation at next dressing change:   Promote sleep for wound healing   Protective dressings over bony prominences  Goal: Participates in plan/prevention/treatment measures  Outcome: Progressing  Flowsheets (Taken 3/24/2025 0715)  Participates in plan/prevention/treatment measures:   Discuss with provider PT/OT consult   Elevate heels  Goal: Prevent/manage excess moisture  Outcome: Progressing  Flowsheets (Taken 3/24/2025 0715)  Prevent/manage excess moisture:   Follow provider orders for dressing changes   Monitor for/manage infection if present  Goal: Prevent/minimize sheer/friction injuries  Outcome: Progressing  Flowsheets (Taken 3/24/2025 0715)  Prevent/minimize sheer/friction injuries:   HOB 30 degrees or less   Increase activity/out of bed for meals   Turn/reposition every 2 hours/use positioning/transfer devices   Use pull sheet  Goal: Promote/optimize nutrition  Outcome: Progressing  Flowsheets (Taken 3/24/2025 0715)  Promote/optimize nutrition:   Consume > 50% meals/supplements   Monitor/record intake including meals   Offer water/supplements/favorite foods  Goal: Promote skin healing  Outcome: Progressing  Flowsheets (Taken 3/24/2025 0715)  Promote skin healing:   Ensure correct size (line/device) and apply per  instructions   Assess skin/pad under line(s)/device(s)   Protective dressings over bony prominences   Rotate device position/do not position patient on device   Turn/reposition every 2 hours/use positioning/transfer devices     Problem: Fall/Injury  Goal: Not fall by end of shift  Outcome: Progressing  Goal: Be free from injury by end of the shift  Outcome: Progressing  Goal: Verbalize understanding of personal  risk factors for fall in the hospital  Outcome: Progressing  Goal: Verbalize understanding of risk factor reduction measures to prevent injury from fall in the home  Outcome: Progressing  Goal: Use assistive devices by end of the shift  Outcome: Progressing  Goal: Pace activities to prevent fatigue by end of the shift  Outcome: Progressing     Problem: Respiratory  Goal: Clear secretions with interventions this shift  Outcome: Progressing  Goal: Minimize anxiety/maximize coping throughout shift  Outcome: Progressing  Goal: Minimal/no exertional discomfort or dyspnea this shift  Outcome: Progressing  Goal: No signs of respiratory distress (eg. Use of accessory muscles. Peds grunting)  Outcome: Progressing  Goal: Patent airway maintained this shift  Outcome: Progressing

## 2025-03-24 NOTE — PROGRESS NOTES
"Tana Hargrove is a 80 y.o. female on day 5 of admission presenting with Acute hypoxic respiratory failure (Multi).  Myelodysplastic syndrome  Subjective   Patient feeling better cough is better no fever appetite is improving hemoglobin is stable       Objective     Physical Exam    Lung examination did show decreased breath sounds on both side     Heart with normal regular rhythm     Abdomen is soft, nontender no hepatosplenomegaly  Extremity trace edema  Last Recorded Vitals  Blood pressure 147/68, pulse 64, temperature 36.3 °C (97.4 °F), temperature source Temporal, resp. rate 16, height 1.549 m (5' 1\"), weight 70 kg (154 lb 5.2 oz), SpO2 92%.  Intake/Output last 3 Shifts:  No intake/output data recorded.    Relevant Results       Latest Reference Range & Units 03/24/25 06:27   WBC 4.4 - 11.3 x10*3/uL 1.7 (L)   nRBC 0.0 - 0.0 /100 WBCs 0.0   RBC 4.00 - 5.20 x10*6/uL 2.59 (L)   HEMOGLOBIN 12.0 - 16.0 g/dL 8.1 (L)   HEMATOCRIT 36.0 - 46.0 % 24.9 (L)   MCV 80 - 100 fL 96   MCH 26.0 - 34.0 pg 31.3   MCHC 32.0 - 36.0 g/dL 32.5   RED CELL DISTRIBUTION WIDTH 11.5 - 14.5 % 19.4 (H)   Platelets 150 - 450 x10*3/uL 99 (L)   (L): Data is abnormally low  (H): Data is abnormally high               Assessment/Plan     #1 acute hypoxic respiratory failure due to COPD and superimposed influenza A     2.  ESRD on hemodialysis     3.  Myelodysplastic syndrome, agree with RBC transfusion, patient receiving Procrit 80,000 every weekly not responding very well     4.  Coronary artery disease, congestive cardiac failure, atrial fibrillation  Supportive care monitor CBC, clinically improving  I spent 20 minutes in the professional and overall care of this patient.      Azeem Medina MD      "

## 2025-03-24 NOTE — PROGRESS NOTES
"But was able to get one placed     Nephrology Progress Note      Nephrology following for ESRD.   Events over night: none  Doing well no CP no SOB    /68 (BP Location: Right arm, Patient Position: Lying)   Pulse 64   Temp 36.3 °C (97.4 °F) (Temporal)   Resp 16   Ht 1.549 m (5' 1\")   Wt 70 kg (154 lb 5.2 oz)   SpO2 92%   BMI 29.16 kg/m²     Input / Output:  24 HR:   Intake/Output Summary (Last 24 hours) at 3/24/2025 1040  Last data filed at 3/24/2025 0915  Gross per 24 hour   Intake 1080 ml   Output --   Net 1080 ml       Physical Exam   Alert and oriented x 3  Neck: no JVD  CV: RRR  Lungs: CTA bilaterally  Abd: soft, NT, ND   Ext: no lower extremity edema    Scheduled medications  allopurinol, 100 mg, oral, BID  amLODIPine, 10 mg, oral, Daily  atorvastatin, 10 mg, oral, Nightly  carvedilol, 25 mg, oral, BID  cefTRIAXone, 2 g, intravenous, q24h  cholecalciferol, 2,000 Units, oral, Daily  cyanocobalamin, 1,000 mcg, oral, Daily  doxycylcine, 100 mg, oral, q12h JOSE DE JESUS  epoetin alejandra or biosimilar, 28,000 Units, subcutaneous, Once per day on Monday Wednesday Friday  guaiFENesin, 1,200 mg, oral, BID  insulin lispro, 0-10 Units, subcutaneous, TID AC  losartan, 50 mg, oral, Daily  oseltamivir, 30 mg, oral, Once per day on Monday Wednesday Friday  pantoprazole, 40 mg, oral, Daily before breakfast   Or  pantoprazole, 40 mg, intravenous, Daily before breakfast  [Held by provider] pioglitazone, 15 mg, oral, Daily  polyethylene glycol, 17 g, oral, Daily  pregabalin, 75 mg, oral, Daily      Continuous medications     PRN medications  PRN medications: acetaminophen, benzocaine-menthol, bisacodyl, dextrose, dextrose, glucagon, glucagon, ipratropium-albuteroL, melatonin, ondansetron ODT **OR** ondansetron, oxygen   Results from last 7 days   Lab Units 03/23/25  0813   SODIUM mmol/L 138   POTASSIUM mmol/L 3.9   CHLORIDE mmol/L 98   CO2 mmol/L 27   BUN mg/dL 29*   CREATININE mg/dL 2.13*   CALCIUM mg/dL 8.8   PROTEIN TOTAL " g/dL 5.6*   BILIRUBIN TOTAL mg/dL 0.5   ALK PHOS U/L 54   ALT U/L 56*   AST U/L 76*   GLUCOSE mg/dL 110*           Results from last 7 days   Lab Units 03/24/25  0627 03/23/25  0813 03/22/25  0451   WBC AUTO x10*3/uL 1.7* 1.6* 2.2*   HEMOGLOBIN g/dL 8.1* 7.9* 7.0*   HEMATOCRIT % 24.9* 24.1* 21.0*   PLATELETS AUTO x10*3/uL 99* 108* 105*      Results from last 7 days   Lab Units 03/23/25  0813 03/22/25  0451 03/21/25  0402   SODIUM mmol/L 138 139 135*   POTASSIUM mmol/L 3.9 3.9 3.7   CHLORIDE mmol/L 98 98 102   CO2 mmol/L 27 28 29   BUN mg/dL 29* 19 29*   CREATININE mg/dL 2.13* 1.74* 2.34*   GLUCOSE mg/dL 110* 89 120*   CALCIUM mg/dL 8.8 8.2* 8.5*       Assessment & Plan:      Patient is 79 y.o. female history of DM 2, HTN, ESRD, MDS who is admitted to hospital for influenza, fall. Nephrology consulted in view of ESRD.       ESRD  -HD F [facility]  -Access is right IJ TDC     Anemia of ESRD and MDS  -Has pancytopenia  -On high-dose MARILYNN with dialysis, will continue here  -PRBC's today. Dr Medina following as well     CKD-MBD  -Not currently on binder, VDRL or calcium emetic  HTN  Acute hypoxic respiratory failure  Influenza A        Recommendations:   -Next hemodialysis today    -Continue Epogen 28,000 units Monday Wednesday Friday      Please message me through [facility] chat with any questions or concerns.     Júnior Ramirez MD  3/24/2025  10:40 AM     [facility]    [address], [address]   [place] [address]  Office: [phone]

## 2025-03-24 NOTE — PROGRESS NOTES
Tana Hargrove is a 80 y.o. female on day 5 of admission presenting with Acute hypoxic respiratory failure (Multi).      Subjective   Patient in room getting dialysis.  Discussed home going and she stated she could not go home she lives alone and has no one to take care of  her.  Discussed going to a SNF and patient adamantly refused.  Discussed with patient that she will discharge tomorrow patient was in agreement.    Patient afebrile and labs improving    14 systems check and negative except as stated above.       Objective     Last Recorded Vitals  /72   Pulse 74   Temp 36.3 °C (97.4 °F) (Temporal)   Resp 16   Wt 70 kg (154 lb 5.2 oz)   SpO2 92%   Intake/Output last 3 Shifts:    Intake/Output Summary (Last 24 hours) at 3/24/2025 1329  Last data filed at 3/24/2025 1255  Gross per 24 hour   Intake 1970 ml   Output 1331 ml   Net 639 ml       Admission Weight  Weight: 70 kg (154 lb 5.2 oz) (03/19/25 0759)    Daily Weight  03/19/25 : 70 kg (154 lb 5.2 oz)    Image Results  XR chest 1 view  Narrative: Interpreted By:  Day Wheeler,   STUDY:  XR CHEST 1 VIEW;  3/21/2025 2:50 pm      INDICATION:  Signs/Symptoms:concern for evolving pneumonia.      COMPARISON:  03/19/2025      ACCESSION NUMBER(S):  [id number]      ORDERING CLINICIAN:  MAGDA MCLAIN      FINDINGS:  AP radiograph of the chest was provided.      Right sided central venous catheter tip projects over the superior  cavoatrial junction.      CARDIOMEDIASTINAL SILHOUETTE:  Cardiomediastinal silhouette is stable in size and configuration.      LUNGS:  Persistent elevation of the right hemidiaphragm. Mild increased  retrocardiac opacification, which could relate to underlying  atelectasis or infiltrate. The right lung is clear. No pneumothorax  or simran pulmonary edema.      ABDOMEN:  No remarkable upper abdominal findings.      BONES:  No acute osseous changes.      Impression: Mild increased retrocardiac opacification, which could relate  to  underlying atelectasis or infiltrate. Further evaluation with CT  chest could be obtained as per clinical need.      Signed by: Day Wheeler 3/23/2025 9:33 AM  Dictation workstation:   [id number]      Physical Exam  Vitals reviewed.   Constitutional:       Appearance: Normal appearance.   HENT:      Head: Normocephalic.      Mouth/Throat:      Mouth: Mucous membranes are dry.   Eyes:      Extraocular Movements: Extraocular movements intact.      Conjunctiva/sclera: Conjunctivae normal.      Pupils: Pupils are equal, round, and reactive to light.   Cardiovascular:      Rate and Rhythm: Normal rate and regular rhythm.      Pulses: Normal pulses.      Heart sounds: Normal heart sounds, S1 normal and S2 normal.   Pulmonary:      Effort: Pulmonary effort is normal.      Breath sounds: Normal breath sounds and air entry.   Abdominal:      General: Abdomen is flat. Bowel sounds are normal.      Palpations: Abdomen is soft.   Musculoskeletal:         General: Normal range of motion.      Cervical back: Full passive range of motion without pain and normal range of motion.   Skin:     General: Skin is warm and dry.   Neurological:      General: No focal deficit present.      Mental Status: She is alert and oriented to person, place, and time. Mental status is at baseline.   Psychiatric:         Attention and Perception: Attention normal.         Mood and Affect: Mood normal.         Speech: Speech normal.             Assessment/Plan       This patient has a central line   Reason for the central line remaining today? Dialysis line    Assessment & Plan  Acute hypoxic respiratory failure (Multi)    79 y.o. female with PMHx s/f atrial fibrillation not on anticoagulation therapy, chronic kidney disease on hemodialysis with Dr. Avelar, myelodysplastic syndrome with iron deficiency anemia followed by Dr. Ulloa, hypertension, diabetes, arthritis, coronary artery disease, diastolic heart failure, COPD, paroxysmal atrial  fibrillation not on anticoagulation therapy no Watchman device secondary to blood disorder who presented to the hospital complaining of generalized weakness over the last several days.       Acute hypoxic respiratory failure secondary to history of COPD and superimposed influenza A  Patient is continued on Tamiflu renal dosing   We will continue the patient on nebulizer treatments and supplemental oxygen  Mucinex and flutter valve to improve expectoration  Patient continues to improve and labs/vitals are stable, lungs clear  Will continue renal dosed tamiflu outpatient for 5 doses total    Secondary bacterial pneumonia  Patient is improving with antibiotics  Retrocardiac infiltrate noted  Will continue rocephin and oral doxycycline  Will continue with doxycycline on discharge       Myelodysplastic syndrome with chronic iron deficiency anemia and pancytopenia  Ferritin level is elevated  Hemoglobin is improving platelets are now stable leukopenia is persistent  Follow-up hematology recommendations  GI prophylaxis w pantoprazole   Will follow up outpatient with Heme onc       Chronic kidney disease stage V on hemodialysis  Typically outpatient dialysis on Tuesdays Thursdays and Saturday   Next hemodialysis due tomorrow     Coronary artery disease, diastolic CHF, atrial fibrillation  Patient will be resumed on her normal cardiac medications including her carvedilol amlodipine  She is not complaining of any chest pain her heart rate is controlled  The patient is not on anticoagulation therapy and has not had a Watchman placed due to her severe blood dyscrasias and inability to tolerate blood thinners     Diabetes type 2  Will continue    VTE prophylaxis  No lovenox /Heparin due to thrombocytopenia    Dispo:  Home in am with [facility]        GAB Joy-CNP    Time spent over 35 minutes

## 2025-03-24 NOTE — PROGRESS NOTES
Physical Therapy                 Therapy Communication Note    Patient Name: Tana Hargrove  MRN: [id number]  [facility]  Room: [address] [place], [place] [address]  Today's Date: 3/24/2025     Discipline: Physical Therapy    PT Missed Visit: Yes     Missed Visit Reason: Missed Visit Reason:  (pt in dialysis)    Missed Time: Attempt    Comment:

## 2025-03-25 ENCOUNTER — APPOINTMENT (OUTPATIENT)
Dept: HEMATOLOGY/ONCOLOGY | Facility: CLINIC | Age: 80
End: 2025-03-25
Payer: MEDICARE

## 2025-03-25 LAB
ANION GAP SERPL CALC-SCNC: 12 MMOL/L (ref 10–20)
BUN SERPL-MCNC: 12 MG/DL (ref 6–23)
CALCIUM SERPL-MCNC: 8.9 MG/DL (ref 8.6–10.3)
CHLORIDE SERPL-SCNC: 101 MMOL/L (ref 98–107)
CO2 SERPL-SCNC: 27 MMOL/L (ref 21–32)
CREAT SERPL-MCNC: 1.76 MG/DL (ref 0.5–1.05)
EGFRCR SERPLBLD CKD-EPI 2021: 29 ML/MIN/1.73M*2
ERYTHROCYTE [DISTWIDTH] IN BLOOD BY AUTOMATED COUNT: 19.1 % (ref 11.5–14.5)
GLUCOSE BLD MANUAL STRIP-MCNC: 119 MG/DL (ref 74–99)
GLUCOSE BLD MANUAL STRIP-MCNC: 163 MG/DL (ref 74–99)
GLUCOSE BLD MANUAL STRIP-MCNC: 180 MG/DL (ref 74–99)
GLUCOSE BLD MANUAL STRIP-MCNC: 184 MG/DL (ref 74–99)
GLUCOSE SERPL-MCNC: 145 MG/DL (ref 74–99)
HCT VFR BLD AUTO: 27.7 % (ref 36–46)
HGB BLD-MCNC: 9.1 G/DL (ref 12–16)
MCH RBC QN AUTO: 31.4 PG (ref 26–34)
MCHC RBC AUTO-ENTMCNC: 32.9 G/DL (ref 32–36)
MCV RBC AUTO: 96 FL (ref 80–100)
NRBC BLD-RTO: 0 /100 WBCS (ref 0–0)
PLATELET # BLD AUTO: 139 X10*3/UL (ref 150–450)
POTASSIUM SERPL-SCNC: 3.5 MMOL/L (ref 3.5–5.3)
RBC # BLD AUTO: 2.9 X10*6/UL (ref 4–5.2)
SODIUM SERPL-SCNC: 136 MMOL/L (ref 136–145)
WBC # BLD AUTO: 1.9 X10*3/UL (ref 4.4–11.3)

## 2025-03-25 PROCEDURE — 2500000002 HC RX 250 W HCPCS SELF ADMINISTERED DRUGS (ALT 637 FOR MEDICARE OP, ALT 636 FOR OP/ED): Performed by: INTERNAL MEDICINE

## 2025-03-25 PROCEDURE — 2500000005 HC RX 250 GENERAL PHARMACY W/O HCPCS: Performed by: INTERNAL MEDICINE

## 2025-03-25 PROCEDURE — 97116 GAIT TRAINING THERAPY: CPT | Mod: GP,CQ

## 2025-03-25 PROCEDURE — 80048 BASIC METABOLIC PNL TOTAL CA: CPT | Performed by: INTERNAL MEDICINE

## 2025-03-25 PROCEDURE — 85027 COMPLETE CBC AUTOMATED: CPT | Performed by: INTERNAL MEDICINE

## 2025-03-25 PROCEDURE — 36415 COLL VENOUS BLD VENIPUNCTURE: CPT | Performed by: INTERNAL MEDICINE

## 2025-03-25 PROCEDURE — 97530 THERAPEUTIC ACTIVITIES: CPT | Mod: GO,CO

## 2025-03-25 PROCEDURE — 94640 AIRWAY INHALATION TREATMENT: CPT

## 2025-03-25 PROCEDURE — 2500000004 HC RX 250 GENERAL PHARMACY W/ HCPCS (ALT 636 FOR OP/ED): Performed by: NURSE PRACTITIONER

## 2025-03-25 PROCEDURE — 2500000001 HC RX 250 WO HCPCS SELF ADMINISTERED DRUGS (ALT 637 FOR MEDICARE OP): Performed by: NURSE PRACTITIONER

## 2025-03-25 PROCEDURE — 2500000005 HC RX 250 GENERAL PHARMACY W/O HCPCS: Performed by: STUDENT IN AN ORGANIZED HEALTH CARE EDUCATION/TRAINING PROGRAM

## 2025-03-25 PROCEDURE — 99231 SBSQ HOSP IP/OBS SF/LOW 25: CPT | Performed by: INTERNAL MEDICINE

## 2025-03-25 PROCEDURE — 1210000001 HC SEMI-PRIVATE ROOM DAILY

## 2025-03-25 PROCEDURE — 94761 N-INVAS EAR/PLS OXIMETRY MLT: CPT

## 2025-03-25 PROCEDURE — 82947 ASSAY GLUCOSE BLOOD QUANT: CPT

## 2025-03-25 PROCEDURE — 2500000004 HC RX 250 GENERAL PHARMACY W/ HCPCS (ALT 636 FOR OP/ED): Performed by: INTERNAL MEDICINE

## 2025-03-25 PROCEDURE — 2500000002 HC RX 250 W HCPCS SELF ADMINISTERED DRUGS (ALT 637 FOR MEDICARE OP, ALT 636 FOR OP/ED): Performed by: NURSE PRACTITIONER

## 2025-03-25 PROCEDURE — 2500000005 HC RX 250 GENERAL PHARMACY W/O HCPCS: Performed by: NURSE PRACTITIONER

## 2025-03-25 RX ORDER — SODIUM CHLORIDE FOR INHALATION 3 %
3 VIAL, NEBULIZER (ML) INHALATION
Status: DISCONTINUED | OUTPATIENT
Start: 2025-03-25 | End: 2025-03-26 | Stop reason: HOSPADM

## 2025-03-25 RX ORDER — SODIUM CHLORIDE FOR INHALATION 3 %
3 VIAL, NEBULIZER (ML) INHALATION EVERY 6 HOURS SCHEDULED
Status: DISCONTINUED | OUTPATIENT
Start: 2025-03-25 | End: 2025-03-25

## 2025-03-25 RX ORDER — IPRATROPIUM BROMIDE AND ALBUTEROL SULFATE 2.5; .5 MG/3ML; MG/3ML
3 SOLUTION RESPIRATORY (INHALATION)
Status: DISCONTINUED | OUTPATIENT
Start: 2025-03-25 | End: 2025-03-26 | Stop reason: HOSPADM

## 2025-03-25 RX ADMIN — DOXYCYCLINE 100 MG: 100 CAPSULE ORAL at 08:26

## 2025-03-25 RX ADMIN — Medication 3 MG: at 20:39

## 2025-03-25 RX ADMIN — CEFTRIAXONE 2 G: 2 INJECTION, SOLUTION INTRAVENOUS at 10:52

## 2025-03-25 RX ADMIN — IPRATROPIUM BROMIDE AND ALBUTEROL SULFATE 3 ML: 2.5; .5 SOLUTION RESPIRATORY (INHALATION) at 12:09

## 2025-03-25 RX ADMIN — SODIUM CHLORIDE SOLN NEBU 3% 3 ML: 3 NEBU SOLN at 19:53

## 2025-03-25 RX ADMIN — CARVEDILOL 25 MG: 25 TABLET, FILM COATED ORAL at 20:39

## 2025-03-25 RX ADMIN — Medication 1000 MCG: at 08:26

## 2025-03-25 RX ADMIN — AMLODIPINE BESYLATE 10 MG: 10 TABLET ORAL at 08:26

## 2025-03-25 RX ADMIN — ALLOPURINOL 100 MG: 100 TABLET ORAL at 08:26

## 2025-03-25 RX ADMIN — GUAIFENESIN 1200 MG: 600 TABLET ORAL at 20:39

## 2025-03-25 RX ADMIN — ACETAMINOPHEN 650 MG: 325 TABLET ORAL at 20:39

## 2025-03-25 RX ADMIN — PREGABALIN 75 MG: 75 CAPSULE ORAL at 08:26

## 2025-03-25 RX ADMIN — Medication 2 L/MIN: at 12:05

## 2025-03-25 RX ADMIN — PANTOPRAZOLE SODIUM 40 MG: 40 TABLET, DELAYED RELEASE ORAL at 06:12

## 2025-03-25 RX ADMIN — Medication 2000 UNITS: at 08:26

## 2025-03-25 RX ADMIN — DOXYCYCLINE 100 MG: 100 CAPSULE ORAL at 20:39

## 2025-03-25 RX ADMIN — IPRATROPIUM BROMIDE AND ALBUTEROL SULFATE 3 ML: 2.5; .5 SOLUTION RESPIRATORY (INHALATION) at 19:52

## 2025-03-25 RX ADMIN — GUAIFENESIN 1200 MG: 600 TABLET ORAL at 08:26

## 2025-03-25 RX ADMIN — SODIUM CHLORIDE SOLN NEBU 3% 3 ML: 3 NEBU SOLN at 12:05

## 2025-03-25 RX ADMIN — INSULIN LISPRO 2 UNITS: 100 INJECTION, SOLUTION INTRAVENOUS; SUBCUTANEOUS at 11:37

## 2025-03-25 RX ADMIN — CARVEDILOL 25 MG: 25 TABLET, FILM COATED ORAL at 08:26

## 2025-03-25 RX ADMIN — INSULIN LISPRO 2 UNITS: 100 INJECTION, SOLUTION INTRAVENOUS; SUBCUTANEOUS at 16:20

## 2025-03-25 RX ADMIN — LOSARTAN POTASSIUM 50 MG: 50 TABLET, FILM COATED ORAL at 08:26

## 2025-03-25 RX ADMIN — ATORVASTATIN CALCIUM 10 MG: 10 TABLET, FILM COATED ORAL at 20:39

## 2025-03-25 RX ADMIN — ALLOPURINOL 100 MG: 100 TABLET ORAL at 20:39

## 2025-03-25 ASSESSMENT — COGNITIVE AND FUNCTIONAL STATUS - GENERAL
MOVING FROM LYING ON BACK TO SITTING ON SIDE OF FLAT BED WITH BEDRAILS: A LITTLE
TOILETING: A LITTLE
MOVING TO AND FROM BED TO CHAIR: A LITTLE
DRESSING REGULAR LOWER BODY CLOTHING: A LITTLE
MOVING TO AND FROM BED TO CHAIR: A LITTLE
DRESSING REGULAR UPPER BODY CLOTHING: A LITTLE
DRESSING REGULAR UPPER BODY CLOTHING: A LITTLE
MOVING FROM LYING ON BACK TO SITTING ON SIDE OF FLAT BED WITH BEDRAILS: A LITTLE
TOILETING: A LITTLE
PERSONAL GROOMING: A LITTLE
TOILETING: A LITTLE
MOVING FROM LYING ON BACK TO SITTING ON SIDE OF FLAT BED WITH BEDRAILS: A LITTLE
HELP NEEDED FOR BATHING: A LITTLE
CLIMB 3 TO 5 STEPS WITH RAILING: TOTAL
CLIMB 3 TO 5 STEPS WITH RAILING: A LITTLE
TURNING FROM BACK TO SIDE WHILE IN FLAT BAD: A LITTLE
MOBILITY SCORE: 18
EATING MEALS: A LITTLE
DAILY ACTIVITIY SCORE: 16
DRESSING REGULAR UPPER BODY CLOTHING: A LITTLE
DAILY ACTIVITIY SCORE: 20
HELP NEEDED FOR BATHING: A LITTLE
DAILY ACTIVITIY SCORE: 20
DRESSING REGULAR LOWER BODY CLOTHING: A LITTLE
WALKING IN HOSPITAL ROOM: A LITTLE
WALKING IN HOSPITAL ROOM: A LITTLE
DRESSING REGULAR LOWER BODY CLOTHING: A LOT
MOBILITY SCORE: 16
WALKING IN HOSPITAL ROOM: A LITTLE
STANDING UP FROM CHAIR USING ARMS: A LITTLE
TURNING FROM BACK TO SIDE WHILE IN FLAT BAD: A LITTLE
MOVING TO AND FROM BED TO CHAIR: A LITTLE
TURNING FROM BACK TO SIDE WHILE IN FLAT BAD: A LITTLE
HELP NEEDED FOR BATHING: A LOT
CLIMB 3 TO 5 STEPS WITH RAILING: A LITTLE
STANDING UP FROM CHAIR USING ARMS: A LITTLE
MOBILITY SCORE: 18
STANDING UP FROM CHAIR USING ARMS: A LITTLE

## 2025-03-25 ASSESSMENT — PAIN - FUNCTIONAL ASSESSMENT: PAIN_FUNCTIONAL_ASSESSMENT: 0-10

## 2025-03-25 ASSESSMENT — PAIN SCALES - GENERAL
PAINLEVEL_OUTOF10: 0 - NO PAIN
PAINLEVEL_OUTOF10: 6
PAINLEVEL_OUTOF10: 0 - NO PAIN
PAINLEVEL_OUTOF10: 1

## 2025-03-25 NOTE — PROCEDURES
Home O2 Evaluation: Patient can only walk limited distance    SpO2 on room air at rest:  90   %    SpO2 on room air with exertion:   86  %    SpO2 on oxygen at rest:     %    SpO2 on oxygen with exertion: 97    %    SpO2 on 4L/min O2 with exertion:    %    Ambulation distance:  40    ft    Recommended O2 device: Nasal Cannula    Recommended O2 L/min: 2L with exertion    Recommended FiO2 %:    (Lacey Naylor at [facility]ilitating Home going O2.)

## 2025-03-25 NOTE — PROGRESS NOTES
Tana Hargrove is a 80 y.o. female on day 6 of admission presenting with Acute hypoxic respiratory failure (Multi).      Subjective   Mrs. Hargrove was seen and examined. She reports that she is feeling better compared to admission.  She reported mild cough  She is aware that her did qualify for home oxygen but declined. I told her that we can do aggressive pulm hygiene today and recheck home oxygen test tomorrow and see if there is any difference.     Objective     Last Recorded Vitals  /72   Pulse 66   Temp 36.2 °C (97.2 °F) (Temporal)   Resp 18   Wt 70 kg (154 lb 5.2 oz)   SpO2 98%   Intake/Output last 3 Shifts:    Intake/Output Summary (Last 24 hours) at 3/25/2025 1042  Last data filed at 3/24/2025 1900  Gross per 24 hour   Intake 1640 ml   Output 1331 ml   Net 309 ml       Admission Weight  Weight: 70 kg (154 lb 5.2 oz) (03/19/25 0759)    Daily Weight  03/19/25 : 70 kg (154 lb 5.2 oz)    Image Results  XR chest 1 view  Narrative: Interpreted By:  Day Wheeler,   STUDY:  XR CHEST 1 VIEW;  3/21/2025 2:50 pm      INDICATION:  Signs/Symptoms:concern for evolving pneumonia.      COMPARISON:  03/19/2025      ACCESSION NUMBER(S):  [id number]      ORDERING CLINICIAN:  MAGDA MCLAIN      FINDINGS:  AP radiograph of the chest was provided.      Right sided central venous catheter tip projects over the superior  cavoatrial junction.      CARDIOMEDIASTINAL SILHOUETTE:  Cardiomediastinal silhouette is stable in size and configuration.      LUNGS:  Persistent elevation of the right hemidiaphragm. Mild increased  retrocardiac opacification, which could relate to underlying  atelectasis or infiltrate. The right lung is clear. No pneumothorax  or simran pulmonary edema.      ABDOMEN:  No remarkable upper abdominal findings.      BONES:  No acute osseous changes.      Impression: Mild increased retrocardiac opacification, which could relate to  underlying atelectasis or infiltrate. Further evaluation with  CT  chest could be obtained as per clinical need.      Signed by: Day Wheeler 3/23/2025 9:33 AM  Dictation workstation:   [id number]      Physical Exam  Constitutional:       Appearance: Normal appearance.   HENT:      Head: Normocephalic and atraumatic.      Mouth/Throat:      Mouth: Mucous membranes are dry.   Eyes:      Extraocular Movements: Extraocular movements intact.      Conjunctiva/sclera: Conjunctivae normal.      Pupils: Pupils are equal, round, and reactive to light.   Cardiovascular:      Rate and Rhythm: Normal rate and regular rhythm.   Pulmonary:      Effort: Pulmonary effort is normal.      Breath sounds: Rales present.   Abdominal:      General: Bowel sounds are normal.      Palpations: Abdomen is soft.   Musculoskeletal:         General: Normal range of motion.      Cervical back: Neck supple.   Neurological:      General: No focal deficit present.      Mental Status: She is alert and oriented to person, place, and time.   Psychiatric:         Mood and Affect: Mood normal.         Behavior: Behavior normal.         Relevant Results               Assessment/Plan        This patient has a central line   Reason for the central line remaining today? Dialysis/Hemapheresis            Assessment & Plan  Acute hypoxic respiratory failure (Multi)  This is due to pna and viral infection.  Gradual improvement, but met criteria for home oxygen  Patient decline home oxygen  Ordered aggressive pulm hygiene  Repeat home oxygen sat test in am of 3/26    Acute viral infection: positive Influenza A.  Continue tamiflu    PNA:  continue abx  On Ceftriaxone and doxy  Will treat for a total of 7 days    Essential Hypertension:  Continue home meds    GERD:  on PPI    Hyperlipidemia:  continue statin    ESRD On HD:  weekly dialysis as schedule.  T, TH, Sat    MDS:  hgb is stable.  Continue transfusion if needed    Hx of atrial fibrillation:  Rate is currently controlled.     DM type 2:   BGL is overall well  controlled  Continue ssi          Reji Haley MD

## 2025-03-25 NOTE — PROGRESS NOTES
"But was able to get one placed     Nephrology Progress Note      Nephrology following for ESRD.   Events over night: none  Doing well no CP no SOB  HD yesterday    /71 (BP Location: Right arm, Patient Position: Lying)   Pulse 69   Temp 36.3 °C (97.4 °F) (Temporal)   Resp 18   Ht 1.549 m (5' 1\")   Wt 70 kg (154 lb 5.2 oz)   SpO2 91%   BMI 29.16 kg/m²     Input / Output:  24 HR:   Intake/Output Summary (Last 24 hours) at 3/25/2025 0739  Last data filed at 3/24/2025 1900  Gross per 24 hour   Intake 2720 ml   Output 1331 ml   Net 1389 ml       Physical Exam   Alert and oriented x 3  Neck: no JVD  CV: RRR  Lungs: CTA bilaterally  Abd: soft, NT, ND   Ext: no lower extremity edema    Scheduled medications  allopurinol, 100 mg, oral, BID  amLODIPine, 10 mg, oral, Daily  atorvastatin, 10 mg, oral, Nightly  carvedilol, 25 mg, oral, BID  cefTRIAXone, 2 g, intravenous, q24h  cholecalciferol, 2,000 Units, oral, Daily  cyanocobalamin, 1,000 mcg, oral, Daily  doxycylcine, 100 mg, oral, q12h JOSE DE JESUS  epoetin alejandra or biosimilar, 28,000 Units, subcutaneous, Once per day on Monday Wednesday Friday  guaiFENesin, 1,200 mg, oral, BID  insulin lispro, 0-10 Units, subcutaneous, TID AC  losartan, 50 mg, oral, Daily  oseltamivir, 30 mg, oral, Once per day on Monday Wednesday Friday  pantoprazole, 40 mg, oral, Daily before breakfast   Or  pantoprazole, 40 mg, intravenous, Daily before breakfast  [Held by provider] pioglitazone, 15 mg, oral, Daily  polyethylene glycol, 17 g, oral, Daily  pregabalin, 75 mg, oral, Daily      Continuous medications     PRN medications  PRN medications: acetaminophen, benzocaine-menthol, bisacodyl, dextrose, dextrose, glucagon, glucagon, ipratropium-albuteroL, melatonin, ondansetron ODT **OR** ondansetron, oxygen   Results from last 7 days   Lab Units 03/23/25  0813   SODIUM mmol/L 138   POTASSIUM mmol/L 3.9   CHLORIDE mmol/L 98   CO2 mmol/L 27   BUN mg/dL 29*   CREATININE mg/dL 2.13*   CALCIUM mg/dL 8.8 "   PROTEIN TOTAL g/dL 5.6*   BILIRUBIN TOTAL mg/dL 0.5   ALK PHOS U/L 54   ALT U/L 56*   AST U/L 76*   GLUCOSE mg/dL 110*           Results from last 7 days   Lab Units 03/24/25  0627 03/23/25  0813 03/22/25  0451   WBC AUTO x10*3/uL 1.7* 1.6* 2.2*   HEMOGLOBIN g/dL 8.1* 7.9* 7.0*   HEMATOCRIT % 24.9* 24.1* 21.0*   PLATELETS AUTO x10*3/uL 99* 108* 105*      Results from last 7 days   Lab Units 03/23/25  0813 03/22/25  0451 03/21/25  0402   SODIUM mmol/L 138 139 135*   POTASSIUM mmol/L 3.9 3.9 3.7   CHLORIDE mmol/L 98 98 102   CO2 mmol/L 27 28 29   BUN mg/dL 29* 19 29*   CREATININE mg/dL 2.13* 1.74* 2.34*   GLUCOSE mg/dL 110* 89 120*   CALCIUM mg/dL 8.8 8.2* 8.5*       Assessment & Plan:      Patient is 79 y.o. female history of DM 2, HTN, ESRD, MDS who is admitted to hospital for influenza, fall. Nephrology consulted in view of ESRD.       ESRD  -HD F [facility]  -Access is right IJ TDC     Anemia of ESRD and MDS  -Has pancytopenia  -On high-dose MARILYNN with dialysis, will continue here  -PRBC's today. Dr Medina following as well     CKD-MBD  -Not currently on binder, VDRL or calcium emetic  HTN  Acute hypoxic respiratory failure  Influenza A        Recommendations:   -Next hemodialysis wednesday    -Continue Epogen 28,000 units Monday Wednesday Friday      Please message me through [facility] chat with any questions or concerns.     Júnior Ramirez MD  3/25/2025  7:39 AM     [facility]    [address], [address]   [place] [address]  Office: [phone]

## 2025-03-25 NOTE — CARE PLAN
Problem: Safety - Adult  Goal: Free from fall injury  Outcome: Progressing     Problem: Discharge Planning  Goal: Discharge to home or other facility with appropriate resources  Outcome: Progressing     Problem: Chronic Conditions and Co-morbidities  Goal: Patient's chronic conditions and co-morbidity symptoms are monitored and maintained or improved  Outcome: Progressing     Problem: Diabetes  Goal: Achieve decreasing blood glucose levels by end of shift  Outcome: Progressing  Goal: Increase stability of blood glucose readings by end of shift  Outcome: Progressing  Goal: Decrease in ketones present in urine by end of shift  Outcome: Progressing  Goal: Maintain electrolyte levels within acceptable range throughout shift  Outcome: Progressing  Goal: Maintain glucose levels >70mg/dl to <250mg/dl throughout shift  Outcome: Progressing  Goal: No changes in neurological exam by end of shift  Outcome: Progressing  Goal: Learn about and adhere to nutrition recommendations by end of shift  Outcome: Progressing  Goal: Vital signs within normal range for age by end of shift  Outcome: Progressing  Goal: Increase self care and/or family involovement by end of shift  Outcome: Progressing  Goal: Receive DSME education by end of shift  Outcome: Progressing     Problem: Pain  Goal: Takes deep breaths with improved pain control throughout the shift  Outcome: Progressing  Goal: Turns in bed with improved pain control throughout the shift  Outcome: Progressing  Goal: Walks with improved pain control throughout the shift  Outcome: Progressing  Goal: Performs ADL's with improved pain control throughout shift  Outcome: Progressing  Goal: Participates in PT with improved pain control throughout the shift  Outcome: Progressing  Goal: Free from opioid side effects throughout the shift  Outcome: Progressing  Goal: Free from acute confusion related to pain meds throughout the shift  Outcome: Progressing     Problem: Skin  Goal: Decreased  wound size/increased tissue granulation at next dressing change  Outcome: Progressing  Goal: Participates in plan/prevention/treatment measures  Outcome: Progressing  Goal: Prevent/manage excess moisture  Outcome: Progressing  Goal: Prevent/minimize sheer/friction injuries  Outcome: Progressing  Flowsheets (Taken 3/24/2025 2102)  Prevent/minimize sheer/friction injuries: HOB 30 degrees or less  Goal: Promote/optimize nutrition  Outcome: Progressing  Goal: Promote skin healing  Outcome: Progressing   The patient's goals for the shift include      The clinical goals for the shift include Patient will remain safe throughout the shift and HDS.

## 2025-03-25 NOTE — PROGRESS NOTES
"Tana Hargrove is a 80 y.o. female on day 6 of admission presenting with Acute hypoxic respiratory failure (Multi).  Myelodysplastic syndrome  Subjective   Patient feeling better cough is better no fever appetite is improving hemoglobin is stable       Objective     Physical Exam    Lung examination did show decreased breath sounds on both side     Heart with normal regular rhythm     Abdomen is soft, nontender no hepatosplenomegaly  Extremity trace edema  Last Recorded Vitals  Blood pressure 162/72, pulse 66, temperature 36.2 °C (97.2 °F), temperature source Temporal, resp. rate 18, height 1.549 m (5' 1\"), weight 70 kg (154 lb 5.2 oz), SpO2 94%.  Intake/Output last 3 Shifts:  I/O last 3 completed shifts:  In: 2720 (38.9 mL/kg) [P.O.:1470; I.V.:800 (11.4 mL/kg); Other:400; IV Piggyback:50]  Out: 1331 (19 mL/kg) [Other:1331]  Weight: 70 kg     Relevant Results        Latest Reference Range & Units 03/25/25 08:33   WBC 4.4 - 11.3 x10*3/uL 1.9 (L)   nRBC 0.0 - 0.0 /100 WBCs 0.0   RBC 4.00 - 5.20 x10*6/uL 2.90 (L)   HEMOGLOBIN 12.0 - 16.0 g/dL 9.1 (L)   HEMATOCRIT 36.0 - 46.0 % 27.7 (L)   MCV 80 - 100 fL 96   MCH 26.0 - 34.0 pg 31.4   MCHC 32.0 - 36.0 g/dL 32.9   RED CELL DISTRIBUTION WIDTH 11.5 - 14.5 % 19.1 (H)   Platelets 150 - 450 x10*3/uL 139 (L)                Assessment/Plan     #1 acute hypoxic respiratory failure due to COPD and superimposed influenza A     2.  ESRD on hemodialysis     3.  Myelodysplastic syndrome, agree with RBC transfusion, patient receiving Procrit 80,000 every weekly not responding very well     4.  Coronary artery disease, congestive cardiac failure, atrial fibrillation  Supportive care monitor CBC, clinically improving  I spent 20 minutes in the professional and overall care of this patient.      Azeem Medina MD      "

## 2025-03-25 NOTE — ASSESSMENT & PLAN NOTE
This is due to pna and viral infection.  Gradual improvement, but met criteria for home oxygen  Patient decline home oxygen  Ordered aggressive pulm hygiene  Repeat home oxygen sat test in am of 3/26    Acute viral infection: positive Influenza A.  Continue tamiflu    PNA:  continue abx  On Ceftriaxone and doxy  Will treat for a total of 7 days    Essential Hypertension:  Continue home meds    GERD:  on PPI    Hyperlipidemia:  continue statin    ESRD On HD:  weekly dialysis as schedule.  T, TH, Sat    MDS:  hgb is stable.  Continue transfusion if needed    Hx of atrial fibrillation:  Rate is currently controlled.     DM type 2:   BGL is overall well controlled  Continue ssi

## 2025-03-25 NOTE — CARE PLAN
Problem: Safety - Adult  Goal: Free from fall injury  Outcome: Progressing     Problem: Discharge Planning  Goal: Discharge to home or other facility with appropriate resources  Outcome: Progressing     Problem: Chronic Conditions and Co-morbidities  Goal: Patient's chronic conditions and co-morbidity symptoms are monitored and maintained or improved  Outcome: Progressing     Problem: Nutrition  Goal: Nutrient intake appropriate for maintaining nutritional needs  Outcome: Progressing     Problem: Diabetes  Goal: Achieve decreasing blood glucose levels by end of shift  Outcome: Progressing  Goal: Increase stability of blood glucose readings by end of shift  Outcome: Progressing  Goal: Decrease in ketones present in urine by end of shift  Outcome: Progressing  Goal: Maintain electrolyte levels within acceptable range throughout shift  Outcome: Progressing  Goal: Maintain glucose levels >70mg/dl to <250mg/dl throughout shift  Outcome: Progressing  Goal: No changes in neurological exam by end of shift  Outcome: Progressing  Goal: Learn about and adhere to nutrition recommendations by end of shift  Outcome: Progressing  Goal: Vital signs within normal range for age by end of shift  Outcome: Progressing  Goal: Increase self care and/or family involovement by end of shift  Outcome: Progressing  Goal: Receive DSME education by end of shift  Outcome: Progressing     Problem: Pain  Goal: Takes deep breaths with improved pain control throughout the shift  Outcome: Progressing  Goal: Turns in bed with improved pain control throughout the shift  Outcome: Progressing  Goal: Walks with improved pain control throughout the shift  Outcome: Progressing  Goal: Performs ADL's with improved pain control throughout shift  Outcome: Progressing  Goal: Participates in PT with improved pain control throughout the shift  Outcome: Progressing  Goal: Free from opioid side effects throughout the shift  Outcome: Progressing  Goal: Free from  acute confusion related to pain meds throughout the shift  Outcome: Progressing     Problem: Skin  Goal: Decreased wound size/increased tissue granulation at next dressing change  Outcome: Progressing  Goal: Participates in plan/prevention/treatment measures  Outcome: Progressing  Goal: Prevent/manage excess moisture  Outcome: Progressing  Goal: Prevent/minimize sheer/friction injuries  Outcome: Progressing  Goal: Promote/optimize nutrition  Outcome: Progressing  Goal: Promote skin healing  Outcome: Progressing     Problem: Fall/Injury  Goal: Not fall by end of shift  Outcome: Progressing  Goal: Be free from injury by end of the shift  Outcome: Progressing  Goal: Verbalize understanding of personal risk factors for fall in the hospital  Outcome: Progressing  Goal: Verbalize understanding of risk factor reduction measures to prevent injury from fall in the home  Outcome: Progressing  Goal: Use assistive devices by end of the shift  Outcome: Progressing  Goal: Pace activities to prevent fatigue by end of the shift  Outcome: Progressing     Problem: Respiratory  Goal: Clear secretions with interventions this shift  Outcome: Progressing  Goal: Minimize anxiety/maximize coping throughout shift  Outcome: Progressing  Goal: Minimal/no exertional discomfort or dyspnea this shift  Outcome: Progressing  Goal: No signs of respiratory distress (eg. Use of accessory muscles. Peds grunting)  Outcome: Progressing  Goal: Patent airway maintained this shift  Outcome: Progressing   The patient's goals for the shift include      The clinical goals for the shift include Pt will remain safe and not fall during shift    Over the shift, the patient did not make progress toward the following goals. Barriers to progression include . Recommendations to address these barriers include .

## 2025-03-25 NOTE — PROGRESS NOTES
Occupational Therapy    OT Treatment    Patient Name: Tana Hargrove  MRN: [id number]  Department: [address]  Room: [address] [place] [address]  Today's Date: 3/25/2025  Time Calculation  Start Time: 0931  Time Calculation (min): 14 min        Assessment:  End of Session Communication: Bedside nurse  End of Session Patient Position: Bed, 3 rail up, Alarm on     Plan:  Treatment Interventions: ADL retraining, UE strengthening/ROM, Functional transfer training, Endurance training, Patient/family training, Equipment evaluation/education, Compensatory technique education  OT Frequency: 3 times per week  OT Discharge Recommendations: Moderate intensity level of continued care  Equipment Recommended upon Discharge: Wheeled walker  OT Recommended Transfer Status: Assist of 1  OT - OK to Discharge: Yes (When medicaly appropriate)  Treatment Interventions: ADL retraining, UE strengthening/ROM, Functional transfer training, Endurance training, Patient/family training, Equipment evaluation/education, Compensatory technique education    Subjective   Previous Visit Info:  OT Last Visit  OT Received On: 03/25/25  General:  General  Prior to Session Communication: Bedside nurse  Patient Position Received: Up in chair, Alarm off, not on at start of session  General Comment: pt. up in chair on arrival. She declined ADLS today argreeable for increasing activitiy tolerance as needed for ADLS with functional mobility around room and transfers from various surface levels. She is CGA for mobility and MIN A for bed mobility.      Pain:  Pain Assessment  0-10 (Numeric) Pain Score: 0 - No pain    Objective    Cognition:  Cognition  Overall Cognitive Status: Within Functional Limits  Orientation Level: Oriented X4  Coordination:     Activities of Daily Living:      Grooming  Grooming Comments: pt. declined ADLS         Bed Mobility/Transfers: Bed Mobility 1  Bed Mobility 1: Sitting to supine  Level of Assistance 1: Minimum assistance  Bed Mobility Comments 1:  pt. needed MIN A to get LE into bed.    Transfers  Transfer: Yes  Transfer 1  Transfer From 1: Chair with arms to  Transfer to 1: Stand  Technique 1: Sit to stand, Stand to sit  Transfer Device 1: Walker  Transfer Level of Assistance 1: Contact guard  Transfers 2  Transfer From 2: Stand to  Transfer to 2:  (sofa surface)  Technique 2: Sit to stand, Stand to sit  Transfer Device 2: Walker  Transfer Level of Assistance 2: Contact guard  Transfers 3  Transfer From 3:  (sofa surface)  Transfer to 3: Bed  Technique 3: Sit to stand, Stand to sit  Transfer Device 3: Walker  Transfer Level of Assistance 3: Contact guard         Functional Mobility:  Functional Mobility  Functional Mobility Performed: Yes  Functional Mobility 1  Device 1: Rolling walker  Assistance 1: Minimum assistance  Comments 1: pt. walking around room              Outcome Measures:[facility] Daily Activity  Putting on and taking off regular lower body clothing: A lot  Bathing (including washing, rinsing, drying): A lot  Putting on and taking off regular upper body clothing: A little  Toileting, which includes using toilet, bedpan or urinal: A little  Taking care of personal grooming such as brushing teeth: A little  Eating Meals: A little  Daily Activity - Total Score: 16        Education Documentation  Body Mechanics, taught by PEDRO Cornell at 3/25/2025 10:18 AM.  Learner: Patient  Readiness: Acceptance  Method: Explanation  Response: Verbalizes Understanding, Needs Reinforcement    Precautions, taught by PEDRO Cornell at 3/25/2025 10:18 AM.  Learner: Patient  Readiness: Acceptance  Method: Explanation  Response: Verbalizes Understanding, Needs Reinforcement    Education Comments  No comments found.        OP EDUCATION:       Goals:  Encounter Problems       Encounter Problems (Active)       ADLs       Patient will perform UB and LB bathing with modified independent level of assistance. (Not Progressing)       Start:  03/22/25     Expected End:  04/05/25            Patient with complete lower body dressing with modified independent level of assistance  with PRN adaptive equipment (Not Progressing)       Start:  03/22/25    Expected End:  04/05/25            Patient will complete toileting including hygiene clothing management/hygiene with modified independent level of assistance. (Not Progressing)       Start:  03/22/25    Expected End:  04/05/25               BALANCE       Pt will maintain dynamic standing balance during ADL task with modified independent level of assistance in order to demonstrate decreased risk of falling and improved postural control. (Progressing)       Start:  03/22/25    Expected End:  04/05/25               TRANSFERS       Patient will complete functional transfers with least restrictive device with modified independent level of assistance. (Progressing)       Start:  03/22/25    Expected End:  04/05/25

## 2025-03-25 NOTE — PROGRESS NOTES
Physical Therapy    Physical Therapy Treatment    Patient Name: Tana Hargrove  MRN: [id number]  Department: [facility]  Room: [address] [place] [address]  Today's Date: 3/25/2025  Time Calculation  Start Time: 0930  Stop Time: 0945  Time Calculation (min): 15 min         Assessment/Plan   PT Assessment  PT Assessment Results: Decreased strength, Decreased endurance  Rehab Prognosis: Good  Barriers to Discharge Home: Physical needs, Caregiver assistance  End of Session Communication: Bedside nurse  Assessment Comment: pt on 2L 02. pt amb in room with no LOB. pt took a seated rest break on couch in room. pt did require assist with LE to get into bed.  End of Session Patient Position: Bed, 3 rail up, Alarm on     PT Plan  Treatment/Interventions: Bed mobility, Transfer training, Gait training, Balance training, Strengthening, Endurance training, Therapeutic exercise, Therapeutic activity, Home exercise program  PT Plan: Ongoing PT  PT Frequency: 4 times per week  PT Discharge Recommendations: Moderate intensity level of continued care  Equipment Recommended upon Discharge:  (Has rollator at home.)  PT Recommended Transfer Status: Assist x2 (For safety.)  PT - OK to Discharge: Yes (To next level of care when medically cleared.)      General Visit Information:   PT  Visit  PT Received On: 03/25/25       Subjective   Precautions:        Date/Time Vitals Session Patient Position Pulse Resp SpO2 BP MAP (mmHg)    03/25/25 0824 --  --  66  18  98 %  162/72  --                 Objective   Pain:  Pain Assessment  0-10 (Numeric) Pain Score: 0 - No pain  Cognition:  Cognition  Overall Cognitive Status: Within Functional Limits  Orientation Level: Oriented X4  Coordination:          Treatments:            Bed Mobility 1  Bed Mobility 1: Sitting to supine  Level of Assistance 1: Minimum assistance    Ambulation/Gait Training  Ambulation/Gait Training Performed: Yes  Ambulation/Gait Training 1  Surface 1: Level tile  Device 1: Rolling  walker  Assistance 1: Contact guard  Quality of Gait 1: Diminished heel strike, Decreased step length  Comments/Distance (ft) 1: 30,15    Outcome Measures:  [facility] Basic Mobility  Turning from your back to your side while in a flat bed without using bedrails: A little  Moving from lying on your back to sitting on the side of a flat bed without using bedrails: A little  Moving to and from bed to chair (including a wheelchair): A little  Standing up from a chair using your arms (e.g. wheelchair or bedside chair): A little  To walk in hospital room: A little  Climbing 3-5 steps with railing: Total  Basic Mobility - Total Score: 16    Education Documentation  Mobility Training, taught by Mabel Zaman PTA at 3/25/2025 10:13 AM.  Learner: Patient  Readiness: Acceptance  Method: Explanation  Response: Verbalizes Understanding    Education Comments  No comments found.        OP EDUCATION:       Encounter Problems       Encounter Problems (Active)       To improve strength, balance, safety awareness:        Patient will complete bed mobility with MIN A.  (Progressing)       Start:  03/22/25    Expected End:  04/05/25            Patient will participate in B LE exercises in order to complete sit <> stand without VC for safe technique 100% time and CGA. (Progressing)       Start:  03/22/25    Expected End:  04/05/25            Patient will ambulate 45 feet x 2 with WW and CGA with upright posture and increased step length.  (Progressing)       Start:  03/22/25    Expected End:  04/05/25

## 2025-03-26 ENCOUNTER — APPOINTMENT (OUTPATIENT)
Dept: DIALYSIS | Facility: HOSPITAL | Age: 80
End: 2025-03-26
Payer: MEDICARE

## 2025-03-26 ENCOUNTER — PHARMACY VISIT (OUTPATIENT)
Dept: PHARMACY | Facility: CLINIC | Age: 80
End: 2025-03-26
Payer: MEDICARE

## 2025-03-26 VITALS
DIASTOLIC BLOOD PRESSURE: 59 MMHG | TEMPERATURE: 97.2 F | HEART RATE: 71 BPM | WEIGHT: 154.32 LBS | HEIGHT: 61 IN | BODY MASS INDEX: 29.14 KG/M2 | OXYGEN SATURATION: 93 % | SYSTOLIC BLOOD PRESSURE: 129 MMHG | RESPIRATION RATE: 16 BRPM

## 2025-03-26 LAB
GLUCOSE BLD MANUAL STRIP-MCNC: 120 MG/DL (ref 74–99)
GLUCOSE BLD MANUAL STRIP-MCNC: 132 MG/DL (ref 74–99)

## 2025-03-26 PROCEDURE — RXMED WILLOW AMBULATORY MEDICATION CHARGE

## 2025-03-26 PROCEDURE — 2500000005 HC RX 250 GENERAL PHARMACY W/O HCPCS: Performed by: INTERNAL MEDICINE

## 2025-03-26 PROCEDURE — 2500000001 HC RX 250 WO HCPCS SELF ADMINISTERED DRUGS (ALT 637 FOR MEDICARE OP): Performed by: NURSE PRACTITIONER

## 2025-03-26 PROCEDURE — 2500000002 HC RX 250 W HCPCS SELF ADMINISTERED DRUGS (ALT 637 FOR MEDICARE OP, ALT 636 FOR OP/ED): Performed by: NURSE PRACTITIONER

## 2025-03-26 PROCEDURE — 82947 ASSAY GLUCOSE BLOOD QUANT: CPT

## 2025-03-26 PROCEDURE — 94640 AIRWAY INHALATION TREATMENT: CPT

## 2025-03-26 PROCEDURE — 2500000002 HC RX 250 W HCPCS SELF ADMINISTERED DRUGS (ALT 637 FOR MEDICARE OP, ALT 636 FOR OP/ED): Performed by: INTERNAL MEDICINE

## 2025-03-26 PROCEDURE — 2500000004 HC RX 250 GENERAL PHARMACY W/ HCPCS (ALT 636 FOR OP/ED): Performed by: NURSE PRACTITIONER

## 2025-03-26 PROCEDURE — 8010000001 HC DIALYSIS - HEMODIALYSIS PER DAY

## 2025-03-26 RX ADMIN — OSELTAMAVIR PHOSPHATE 30 MG: 30 CAPSULE ORAL at 08:22

## 2025-03-26 RX ADMIN — ALLOPURINOL 100 MG: 100 TABLET ORAL at 08:22

## 2025-03-26 RX ADMIN — PREGABALIN 75 MG: 75 CAPSULE ORAL at 08:22

## 2025-03-26 RX ADMIN — IPRATROPIUM BROMIDE AND ALBUTEROL SULFATE 3 ML: 2.5; .5 SOLUTION RESPIRATORY (INHALATION) at 07:23

## 2025-03-26 RX ADMIN — GUAIFENESIN 1200 MG: 600 TABLET ORAL at 08:22

## 2025-03-26 RX ADMIN — AMLODIPINE BESYLATE 10 MG: 10 TABLET ORAL at 08:22

## 2025-03-26 RX ADMIN — Medication 1000 MCG: at 08:22

## 2025-03-26 RX ADMIN — SODIUM CHLORIDE SOLN NEBU 3% 3 ML: 3 NEBU SOLN at 07:23

## 2025-03-26 RX ADMIN — LOSARTAN POTASSIUM 50 MG: 50 TABLET, FILM COATED ORAL at 08:22

## 2025-03-26 RX ADMIN — CARVEDILOL 25 MG: 25 TABLET, FILM COATED ORAL at 08:22

## 2025-03-26 RX ADMIN — PANTOPRAZOLE SODIUM 40 MG: 40 TABLET, DELAYED RELEASE ORAL at 07:01

## 2025-03-26 RX ADMIN — DOXYCYCLINE 100 MG: 100 CAPSULE ORAL at 08:22

## 2025-03-26 RX ADMIN — Medication 2000 UNITS: at 08:22

## 2025-03-26 ASSESSMENT — COGNITIVE AND FUNCTIONAL STATUS - GENERAL
STANDING UP FROM CHAIR USING ARMS: A LITTLE
DRESSING REGULAR UPPER BODY CLOTHING: A LITTLE
CLIMB 3 TO 5 STEPS WITH RAILING: A LITTLE
DRESSING REGULAR LOWER BODY CLOTHING: A LITTLE
MOVING TO AND FROM BED TO CHAIR: A LITTLE
MOVING FROM LYING ON BACK TO SITTING ON SIDE OF FLAT BED WITH BEDRAILS: A LITTLE
MOBILITY SCORE: 18
DAILY ACTIVITIY SCORE: 20
TURNING FROM BACK TO SIDE WHILE IN FLAT BAD: A LITTLE
TOILETING: A LITTLE
HELP NEEDED FOR BATHING: A LITTLE
WALKING IN HOSPITAL ROOM: A LITTLE

## 2025-03-26 ASSESSMENT — PAIN - FUNCTIONAL ASSESSMENT: PAIN_FUNCTIONAL_ASSESSMENT: 0-10

## 2025-03-26 ASSESSMENT — PAIN SCALES - GENERAL: PAINLEVEL_OUTOF10: 0 - NO PAIN

## 2025-03-26 NOTE — POST-PROCEDURE NOTE
Report to Receiving RN:    Report To: Flora CAROLINA  Time Report Called: [id number]  Hand-Off Communication: VSS, CVC clamped, capped and secure, saline in lumens  Complications During Treatment: No  Ultrafiltration Treatment: No  Medications Administered During Dialysis: No  Blood Products Administered During Dialysis: No  Labs Sent During Dialysis: No  Heparin Drip Rate Changes: N/A  Dialysis Catheter Dressing: clean/dry/intact    Last Updated: 1:19 PM by АЛЕКСАНДР TURCIOS

## 2025-03-26 NOTE — CARE PLAN
The patient's goals for the shift include  comfort    The clinical goals for the shift include pt will remain HDS throughout shift    Over the shift, the patient did make progress toward the following goals.

## 2025-03-26 NOTE — PRE-PROCEDURE NOTE
Report from Sending RN:    Report From: Flora CAROLINA  Recent Surgery of Procedure: No  Baseline Level of Consciousness (LOC): A & O x 4  Oxygen Use: No  Type: N/A  Diabetic: Yes  Last BP Med Given Day of Dialysis: See MAR  Last Pain Med Given: See MAR  Lab Tests to be Obtained with Dialysis: No  Blood Transfusion to be Given During Dialysis: No  Available IV Access: Yes  Medications to be Administered During Dialysis: See MAR  Continuous IV Infusion Running: No  Restraints on Currently or in the Last 24 Hours: No  Hand-Off Communication: Stable for HD  Dialysis Catheter Dressing: will assess @ bedside  Last Dressing Change: will assess @ bedside

## 2025-03-26 NOTE — PROGRESS NOTES
" Nephrology Progress Note      Nephrology following for ESRD.   Events over night: none  Doing well no CP no SOB  HD today    /59   Pulse 72   Temp 36.2 °C (97.2 °F) (Temporal)   Resp 16   Ht 1.549 m (5' 1\")   Wt 70 kg (154 lb 5.2 oz)   SpO2 93%   BMI 29.16 kg/m²     Input / Output:  24 HR:   Intake/Output Summary (Last 24 hours) at 3/26/2025 1316  Last data filed at 3/26/2025 1302  Gross per 24 hour   Intake 1400 ml   Output --   Net 1400 ml       Physical Exam   Alert and oriented x 3  Neck: no JVD  CV: RRR  Lungs: CTA bilaterally  Abd: soft, NT, ND   Ext: no lower extremity edema    Scheduled medications  allopurinol, 100 mg, oral, BID  amLODIPine, 10 mg, oral, Daily  atorvastatin, 10 mg, oral, Nightly  carvedilol, 25 mg, oral, BID  cefTRIAXone, 2 g, intravenous, q24h  cholecalciferol, 2,000 Units, oral, Daily  cyanocobalamin, 1,000 mcg, oral, Daily  doxycylcine, 100 mg, oral, q12h JOSE DE JESUS  epoetin alejandra or biosimilar, 28,000 Units, subcutaneous, Once per day on Monday Wednesday Friday  guaiFENesin, 1,200 mg, oral, BID  insulin lispro, 0-10 Units, subcutaneous, TID AC  ipratropium-albuteroL, 3 mL, nebulization, TID  losartan, 50 mg, oral, Daily  oseltamivir, 30 mg, oral, Once per day on Monday Wednesday Friday  pantoprazole, 40 mg, oral, Daily before breakfast   Or  pantoprazole, 40 mg, intravenous, Daily before breakfast  [Held by provider] pioglitazone, 15 mg, oral, Daily  polyethylene glycol, 17 g, oral, Daily  pregabalin, 75 mg, oral, Daily  sodium chloride, 3 mL, nebulization, TID      Continuous medications     PRN medications  PRN medications: acetaminophen, benzocaine-menthol, bisacodyl, dextrose, dextrose, glucagon, glucagon, melatonin, ondansetron ODT **OR** ondansetron, oxygen   Results from last 7 days   Lab Units 03/25/25  0833 03/23/25  0813   SODIUM mmol/L 136 138   POTASSIUM mmol/L 3.5 3.9   CHLORIDE mmol/L 101 98   CO2 mmol/L 27 27   BUN mg/dL 12 29*   CREATININE mg/dL 1.76* 2.13* "   CALCIUM mg/dL 8.9 8.8   PROTEIN TOTAL g/dL  --  5.6*   BILIRUBIN TOTAL mg/dL  --  0.5   ALK PHOS U/L  --  54   ALT U/L  --  56*   AST U/L  --  76*   GLUCOSE mg/dL 145* 110*           Results from last 7 days   Lab Units 03/25/25  0833 03/24/25  0627 03/23/25  0813   WBC AUTO x10*3/uL 1.9* 1.7* 1.6*   HEMOGLOBIN g/dL 9.1* 8.1* 7.9*   HEMATOCRIT % 27.7* 24.9* 24.1*   PLATELETS AUTO x10*3/uL 139* 99* 108*      Results from last 7 days   Lab Units 03/25/25  0833 03/23/25  0813 03/22/25  0451   SODIUM mmol/L 136 138 139   POTASSIUM mmol/L 3.5 3.9 3.9   CHLORIDE mmol/L 101 98 98   CO2 mmol/L 27 27 28   BUN mg/dL 12 29* 19   CREATININE mg/dL 1.76* 2.13* 1.74*   GLUCOSE mg/dL 145* 110* 89   CALCIUM mg/dL 8.9 8.8 8.2*       Assessment & Plan:      Patient is 79 y.o. female history of DM 2, HTN, ESRD, MDS who is admitted to hospital for influenza, fall. Nephrology consulted in view of ESRD.       ESRD  -HD [facility]  -Access is right IJ TDC     Anemia of ESRD and MDS  -Has pancytopenia  -On high-dose MARILYNN with dialysis, will continue here  -PRBC's today. Dr Medina following as well     CKD-MBD  -Not currently on binder, VDRL or calcium emetic  HTN  Acute hypoxic respiratory failure  Influenza A        Recommendations:   -Next hemodialysis today    -Continue Epogen 28,000 units Monday Wednesday Friday      Please message me through EPIC chat with any questions or concerns.     Júnior Ramirez MD  3/26/2025  1:16 PM     [facility]    [address], [address]   [place] [address]  Office: [phone]

## 2025-03-26 NOTE — DISCHARGE SUMMARY
Discharge Diagnosis  Acute hypoxic respiratory failure (Multi)    Issues Requiring Follow-Up  Weekly dialysis    Discharge Meds     Medication List      START taking these medications     doxycycline 100 mg capsule; Commonly known as: Vibramycin; Take 1   capsule (100 mg) by mouth every 12 hours for 5 days. Take with at least 8   ounces (large glass) of water, do not lie down for 30 minutes after   oseltamivir 30 mg capsule; Commonly known as: Tamiflu; Take 1 capsule   (30 mg) by mouth 3 times a week for 7 days.     CONTINUE taking these medications     allopurinol 100 mg tablet; Commonly known as: Zyloprim   amLODIPine 10 mg tablet; Commonly known as: Norvasc   atorvastatin 10 mg tablet; Commonly known as: Lipitor; TAKE ONE TABLET   BY MOUTH ONCE DAILY   carvedilol 25 mg tablet; Commonly known as: Coreg; Take 1 tablet (25 mg)   by mouth 2 times a day.   cholecalciferol 50 MCG (2000 UT) tablet; Commonly known as: Vitamin D-3   cyanocobalamin 1,000 mcg tablet; Commonly known as: Vitamin B-12   losartan 50 mg tablet; Commonly known as: Cozaar; TAKE ONE TABLET BY   MOUTH ONCE DAILY   pioglitazone 15 mg tablet; Commonly known as: Actos; TAKE ONE TABLET BY   MOUTH ONCE DAILY   pregabalin 100 mg capsule; Commonly known as: Lyrica; Take 1 capsule   (100 mg) by mouth 2 times a day.       Test Results Pending At Discharge  Pending Labs       No current pending labs.            Hospital Course  Mrs. Hargrove was admitted with generalized weakness. She was diagnosed with influenza viral infection with superimposed pna and acute respiratory failure with hypoxia. She responded well to abx and antiviral treatment.   Unfortunately a day prior to discharge, we did home oxygen test. She met criteria for home oxygen, but declined and stated I am not going home with oxygen despite all effort to explain to her why she needs it. Aggressive pulmo hygiene was give with 3% NS nebulizer. Repeat Ambulatory home oxygen test was ordered, but  [facility] stated that per their policy this can only be repeated after 24 hours. Patient elected to be discharged.  She was discharged in stable condition.   She had her weekly dialysis during her hospital stay.    Time spent in discharge of patient is greater than 30 minutes.     Pertinent Physical Exam At Time of Discharge  Physical Exam  Constitutional:       Appearance: Normal appearance.   HENT:      Head: Normocephalic and atraumatic.      Nose: Nose normal.      Mouth/Throat:      Mouth: Mucous membranes are moist.   Eyes:      Extraocular Movements: Extraocular movements intact.      Pupils: Pupils are equal, round, and reactive to light.   Cardiovascular:      Rate and Rhythm: Normal rate and regular rhythm.      Pulses: Normal pulses.      Heart sounds: Normal heart sounds.   Pulmonary:      Effort: Pulmonary effort is normal.      Breath sounds: Normal breath sounds.   Abdominal:      General: Bowel sounds are normal.      Palpations: Abdomen is soft.   Musculoskeletal:         General: Normal range of motion.      Cervical back: Normal range of motion and neck supple.   Neurological:      General: No focal deficit present.      Mental Status: She is alert and oriented to person, place, and time.   Psychiatric:         Mood and Affect: Mood normal.         Behavior: Behavior normal.         Outpatient Follow-Up  Future Appointments   Date Time Provider Department Center   4/1/2025  8:40 AM Carlos Higgins MD [id number] [facility]   4/1/2025 11:00 AM Humphrey Callaway, DO [id number] [facility]   4/9/2025  3:00 PM POR VASC 6 PORVSC [facility]   4/10/2025 11:00 AM Rogerio Levi, DO [id number] [facility]   11/13/2025 10:00 AM Carlos Higgins MD [id number] [facility]         Reij Haley MD

## 2025-03-26 NOTE — PROGRESS NOTES
Physical Therapy                 Therapy Communication Note    Patient Name: Tana Hargrove  MRN: [id number]  Department: [facility] [place] [facility]  Room: [address] [place], [place] [address]  Today's Date: 3/26/2025     Discipline: Physical Therapy    PT Missed Visit: Yes     Missed Visit Reason: Other (Comment)    Missed Time: Attempt    Comment: pt in dialysis at this time

## 2025-03-26 NOTE — PROGRESS NOTES
Pt medically ready to discharge. Plan is for Pt to discharge home w/ [facility], same accepting per [facility]. TCC (SW) attached and sent HHC orders and AVS via [facility]. No further [facility] (SW) assistance needs anticipated.        03/26/25 1113   Discharge Planning   Expected Discharge Disposition Home H   Stroke Family Assessment   Stroke Family Assessment Needed No   Intensity of Service   Intensity of Service 0-30 min

## 2025-03-27 ENCOUNTER — PATIENT OUTREACH (OUTPATIENT)
Dept: PRIMARY CARE | Facility: CLINIC | Age: 80
End: 2025-03-27
Payer: MEDICARE

## 2025-03-27 ENCOUNTER — APPOINTMENT (OUTPATIENT)
Dept: INFUSION THERAPY | Facility: HOSPITAL | Age: 80
End: 2025-03-27
Payer: MEDICARE

## 2025-03-27 NOTE — PROGRESS NOTES
Discharge Facility: [facility]   Discharge Diagnosis: Acute hypoxic respiratory failure (Multi)   Admission Date: 3/19/25  Discharge Date: 3/26/25    PCP Appointment Date: 4/1/25  Specialist Appointment Date: chante Medina  Hospital Encounter and Summary Linked: Yes  ED to Hosp-Admission (Discharged) with Reji Haley MD; Silvia oRper MD (03/19/2025)     Two attempts were made to reach patient within two business days after discharge. Voicemail left with contact information for patient to call back with any non-emergent questions or concerns.     65 y/o male with h/o HTN, Thyroid Goiter s/p thyroidectomy, HLD was admitted on 1/18 for worsening confusion. Pt's ex wife concerned about pt reporting that he has been acting strange at home and that he has been saying strange things to their 11 year old son. Ex wife states speech was slurred. Patient was admitted with altered mental status. Workup noted hallucinations due to Etoh withdrawal found to have severe LV dysfunction and AMARJIT of unclear duration. He received zosyn IV for probable aspiration pneumonia. On 1/20 he was transferred to CCU with mixed respiratory failure due to aspiration pneumonitis and worsening mental status requiring intubation. On 1/27 the patient was noted with rapidly deteriorating hypotension, rapidly increasing levophed requirements. The patient was cardioverted at 120J x1 with improvement in HR to 110s-120s. Noted febrile and given meropenem and vancomycin IV.    1. Febrile syndrome. Possible RLL pneumonia. ?aspiration pneumonia. B/l pleural effusions. ARF. Encephalopathy.   -obtain BC x 2, urine c/s  -concern about MDRO's was raised  -continue meropenem 1 gm IV q12h  -reason for abx use and side effects reviewed with patient; monitor BMP   -respiratory care  -old chart reviewed to assess prior cultures  -monitor temps  -f/u CBC  -supportive care  2. Other issues:   -care per medicine   63 y/o male with h/o HTN, Thyroid Goiter s/p thyroidectomy, HLD was admitted on 1/18 for worsening confusion. Pt's ex wife concerned about pt reporting that he has been acting strange at home and that he has been saying strange things to their 11 year old son. Ex wife states speech was slurred. Patient was admitted with altered mental status. Workup noted hallucinations due to Etoh withdrawal found to have severe LV dysfunction and AMARJIT of unclear duration. He received zosyn IV for probable aspiration pneumonia. On 1/20 he was transferred to CCU with mixed respiratory failure due to aspiration pneumonitis and worsening mental status requiring intubation. On 1/27 the patient was noted with rapidly deteriorating hypotension, rapidly increasing levophed requirements. The patient was cardioverted at 120J x1 with improvement in HR to 110s-120s. Noted febrile and given meropenem and vancomycin IV.    1. Febrile syndrome. Possible RLL pneumonia. ?aspiration pneumonia. B/l pleural effusions. ARF. Rapid A.fib s/p cardioversion. Encephalopathy.   -obtain BC x 2, urine c/s  -concern about MDRO's was raised  -continue meropenem 1 gm IV q12h  -reason for abx use and side effects reviewed with patient; monitor BMP   -respiratory care  -old chart reviewed to assess prior cultures  -monitor temps  -f/u CBC  -supportive care  2. Other issues:   -care per medicine      d/w medical team

## 2025-04-01 ENCOUNTER — APPOINTMENT (OUTPATIENT)
Dept: CARDIOLOGY | Facility: CLINIC | Age: 80
End: 2025-04-01
Payer: MEDICARE

## 2025-04-01 ENCOUNTER — OFFICE VISIT (OUTPATIENT)
Dept: PRIMARY CARE | Facility: CLINIC | Age: 80
End: 2025-04-01
Payer: MEDICARE

## 2025-04-01 VITALS
DIASTOLIC BLOOD PRESSURE: 68 MMHG | RESPIRATION RATE: 16 BRPM | HEIGHT: 61 IN | SYSTOLIC BLOOD PRESSURE: 131 MMHG | HEART RATE: 62 BPM | BODY MASS INDEX: 29.16 KG/M2 | TEMPERATURE: 96.9 F | OXYGEN SATURATION: 97 %

## 2025-04-01 VITALS
HEART RATE: 60 BPM | HEIGHT: 61 IN | SYSTOLIC BLOOD PRESSURE: 118 MMHG | DIASTOLIC BLOOD PRESSURE: 66 MMHG | WEIGHT: 154 LBS | BODY MASS INDEX: 29.07 KG/M2

## 2025-04-01 DIAGNOSIS — I50.32 CHRONIC HEART FAILURE WITH PRESERVED EJECTION FRACTION: ICD-10-CM

## 2025-04-01 DIAGNOSIS — N18.6 ANEMIA DUE TO CHRONIC KIDNEY DISEASE, ON CHRONIC DIALYSIS: Chronic | ICD-10-CM

## 2025-04-01 DIAGNOSIS — R09.A2 GLOBUS SENSATION: ICD-10-CM

## 2025-04-01 DIAGNOSIS — D63.1 ANEMIA DUE TO CHRONIC KIDNEY DISEASE, ON CHRONIC DIALYSIS: Chronic | ICD-10-CM

## 2025-04-01 DIAGNOSIS — Z99.2 ESRD (END STAGE RENAL DISEASE) ON DIALYSIS (MULTI): Chronic | ICD-10-CM

## 2025-04-01 DIAGNOSIS — M79.18 MYOFASCIAL PAIN SYNDROME: ICD-10-CM

## 2025-04-01 DIAGNOSIS — Z09 HOSPITAL DISCHARGE FOLLOW-UP: Primary | ICD-10-CM

## 2025-04-01 DIAGNOSIS — G57.93 NEUROPATHIC PAIN OF BOTH FEET: ICD-10-CM

## 2025-04-01 DIAGNOSIS — D46.4 REFRACTORY ANEMIA, UNSPECIFIED: ICD-10-CM

## 2025-04-01 DIAGNOSIS — K21.9 GASTROESOPHAGEAL REFLUX DISEASE, UNSPECIFIED WHETHER ESOPHAGITIS PRESENT: ICD-10-CM

## 2025-04-01 DIAGNOSIS — E78.5 HYPERLIPIDEMIA, UNSPECIFIED HYPERLIPIDEMIA TYPE: Chronic | ICD-10-CM

## 2025-04-01 DIAGNOSIS — E11.65 TYPE 2 DIABETES MELLITUS WITH HYPERGLYCEMIA, WITHOUT LONG-TERM CURRENT USE OF INSULIN: ICD-10-CM

## 2025-04-01 DIAGNOSIS — I48.0 PAROXYSMAL ATRIAL FIBRILLATION (MULTI): Primary | ICD-10-CM

## 2025-04-01 DIAGNOSIS — J44.1 CHRONIC OBSTRUCTIVE PULMONARY DISEASE WITH (ACUTE) EXACERBATION (MULTI): ICD-10-CM

## 2025-04-01 DIAGNOSIS — E11.9 TYPE 2 DIABETES MELLITUS WITHOUT COMPLICATION, WITHOUT LONG-TERM CURRENT USE OF INSULIN: ICD-10-CM

## 2025-04-01 DIAGNOSIS — I10 HYPERTENSION, ESSENTIAL: Chronic | ICD-10-CM

## 2025-04-01 DIAGNOSIS — D46.9 MYELODYSPLASTIC SYNDROME (MULTI): Chronic | ICD-10-CM

## 2025-04-01 DIAGNOSIS — E13.40 OTHER SPECIFIED DIABETES MELLITUS WITH DIABETIC NEUROPATHY, UNSPECIFIED: ICD-10-CM

## 2025-04-01 DIAGNOSIS — I10 ESSENTIAL (PRIMARY) HYPERTENSION: ICD-10-CM

## 2025-04-01 DIAGNOSIS — E78.00 PURE HYPERCHOLESTEROLEMIA: Chronic | ICD-10-CM

## 2025-04-01 DIAGNOSIS — Z99.2 ANEMIA DUE TO CHRONIC KIDNEY DISEASE, ON CHRONIC DIALYSIS: Chronic | ICD-10-CM

## 2025-04-01 DIAGNOSIS — I48.91 ATRIAL FIBRILLATION WITH RAPID VENTRICULAR RESPONSE (MULTI): ICD-10-CM

## 2025-04-01 DIAGNOSIS — N18.6 ESRD (END STAGE RENAL DISEASE) ON DIALYSIS (MULTI): Chronic | ICD-10-CM

## 2025-04-01 PROCEDURE — 1111F DSCHRG MED/CURRENT MED MERGE: CPT | Performed by: STUDENT IN AN ORGANIZED HEALTH CARE EDUCATION/TRAINING PROGRAM

## 2025-04-01 PROCEDURE — 1160F RVW MEDS BY RX/DR IN RCRD: CPT | Performed by: STUDENT IN AN ORGANIZED HEALTH CARE EDUCATION/TRAINING PROGRAM

## 2025-04-01 PROCEDURE — 1111F DSCHRG MED/CURRENT MED MERGE: CPT | Performed by: INTERNAL MEDICINE

## 2025-04-01 PROCEDURE — 3078F DIAST BP <80 MM HG: CPT | Performed by: INTERNAL MEDICINE

## 2025-04-01 PROCEDURE — 99214 OFFICE O/P EST MOD 30 MIN: CPT | Performed by: INTERNAL MEDICINE

## 2025-04-01 PROCEDURE — 1157F ADVNC CARE PLAN IN RCRD: CPT | Performed by: STUDENT IN AN ORGANIZED HEALTH CARE EDUCATION/TRAINING PROGRAM

## 2025-04-01 PROCEDURE — 1036F TOBACCO NON-USER: CPT | Performed by: INTERNAL MEDICINE

## 2025-04-01 PROCEDURE — 99496 TRANSJ CARE MGMT HIGH F2F 7D: CPT | Performed by: STUDENT IN AN ORGANIZED HEALTH CARE EDUCATION/TRAINING PROGRAM

## 2025-04-01 PROCEDURE — 3074F SYST BP LT 130 MM HG: CPT | Performed by: INTERNAL MEDICINE

## 2025-04-01 PROCEDURE — 1157F ADVNC CARE PLAN IN RCRD: CPT | Performed by: INTERNAL MEDICINE

## 2025-04-01 PROCEDURE — 1036F TOBACCO NON-USER: CPT | Performed by: STUDENT IN AN ORGANIZED HEALTH CARE EDUCATION/TRAINING PROGRAM

## 2025-04-01 PROCEDURE — 1159F MED LIST DOCD IN RCRD: CPT | Performed by: STUDENT IN AN ORGANIZED HEALTH CARE EDUCATION/TRAINING PROGRAM

## 2025-04-01 PROCEDURE — 3078F DIAST BP <80 MM HG: CPT | Performed by: STUDENT IN AN ORGANIZED HEALTH CARE EDUCATION/TRAINING PROGRAM

## 2025-04-01 PROCEDURE — 3075F SYST BP GE 130 - 139MM HG: CPT | Performed by: STUDENT IN AN ORGANIZED HEALTH CARE EDUCATION/TRAINING PROGRAM

## 2025-04-01 PROCEDURE — 93000 ELECTROCARDIOGRAM COMPLETE: CPT | Performed by: INTERNAL MEDICINE

## 2025-04-01 RX ORDER — ATORVASTATIN CALCIUM 10 MG/1
10 TABLET, FILM COATED ORAL DAILY
Qty: 90 TABLET | Refills: 1 | Status: SHIPPED | OUTPATIENT
Start: 2025-04-01

## 2025-04-01 RX ORDER — PANTOPRAZOLE SODIUM 40 MG/1
40 TABLET, DELAYED RELEASE ORAL
Qty: 30 TABLET | Refills: 2 | Status: SHIPPED | OUTPATIENT
Start: 2025-04-01 | End: 2025-06-30

## 2025-04-01 RX ORDER — CARVEDILOL 25 MG/1
25 TABLET ORAL 2 TIMES DAILY
Qty: 180 TABLET | Refills: 1 | Status: SHIPPED | OUTPATIENT
Start: 2025-04-01 | End: 2025-09-28

## 2025-04-01 RX ORDER — LOSARTAN POTASSIUM 50 MG/1
50 TABLET ORAL DAILY
Qty: 90 TABLET | Refills: 1 | Status: SHIPPED | OUTPATIENT
Start: 2025-04-01

## 2025-04-01 RX ORDER — PREGABALIN 100 MG/1
100 CAPSULE ORAL 2 TIMES DAILY
Qty: 60 CAPSULE | Refills: 0 | Status: SHIPPED | OUTPATIENT
Start: 2025-04-01

## 2025-04-01 RX ORDER — PIOGLITAZONEHYDROCHLORIDE 15 MG/1
15 TABLET ORAL DAILY
Qty: 90 TABLET | Refills: 1 | Status: SHIPPED | OUTPATIENT
Start: 2025-04-01

## 2025-04-01 SDOH — ECONOMIC STABILITY: FOOD INSECURITY: WITHIN THE PAST 12 MONTHS, YOU WORRIED THAT YOUR FOOD WOULD RUN OUT BEFORE YOU GOT MONEY TO BUY MORE.: NEVER TRUE

## 2025-04-01 SDOH — ECONOMIC STABILITY: FOOD INSECURITY: WITHIN THE PAST 12 MONTHS, THE FOOD YOU BOUGHT JUST DIDN'T LAST AND YOU DIDN'T HAVE MONEY TO GET MORE.: NEVER TRUE

## 2025-04-01 ASSESSMENT — ENCOUNTER SYMPTOMS
WHEEZING: 0
UNEXPECTED WEIGHT CHANGE: 0
ABDOMINAL PAIN: 0
NAUSEA: 0
CHILLS: 0
DIARRHEA: 0
CONFUSION: 0
HEADACHES: 0
VOMITING: 0
COUGH: 0
PALPITATIONS: 0
FATIGUE: 0
SHORTNESS OF BREATH: 0
MUSCULOSKELETAL NEGATIVE: 1
DIZZINESS: 0
FEVER: 0
CONSTIPATION: 0
COLOR CHANGE: 0

## 2025-04-01 ASSESSMENT — LIFESTYLE VARIABLES
HOW MANY STANDARD DRINKS CONTAINING ALCOHOL DO YOU HAVE ON A TYPICAL DAY: PATIENT DOES NOT DRINK
HOW OFTEN DO YOU HAVE A DRINK CONTAINING ALCOHOL: NEVER
AUDIT-C TOTAL SCORE: 0
SKIP TO QUESTIONS 9-10: 1
HOW OFTEN DO YOU HAVE SIX OR MORE DRINKS ON ONE OCCASION: NEVER

## 2025-04-01 NOTE — PROGRESS NOTES
"Subjective   Patient ID: Tana Hargrove is a 80 y.o. female who presents for Hospital Follow-up (Pt admitted 3/19/2025 - 3/26/2025 dx influenza,  acute hypoxic respiratory failure.  Pt feels when she eats, food is stuck at the top of her stomach).       Patient: Tana Hargrove  : 1945  PCP: Carlos Higgins MD  MRN: [id number]  Program: Transitional Care Management  Status: Enrolled  Effective Dates: 3/27/2025 - present  Responsible Staff: Gerri Boykin RN  Social Drivers to be Addressed: Physical Activity        Tana Hargrove is a 80 y.o. female presenting today for follow-up after being discharged from the hospital 6 days ago. The main problem requiring admission was influenza A, superimposed pna and acute hypoxic respiratory failure. The discharge summary and/or Transitional Care Management documentation was reviewed. Medication reconciliation was performed as indicated via the \"[place] as Reviewed\" timestamp.     Tana Hargrove was contacted by Transitional Care Management services two days after her discharge. This encounter and supporting documentation was reviewed.  She is here for hospital disch follow up. She was in the  [place] from 3/19/25-3/26/25 for influenza A, superimposed pna and acute hypoxic respiratory failure. She was started on abx and antiviral and was discharged on same meds. She was also rec home O2 however wasn't able to do repeat test and pt declined to be on O2, she is satting 97% on RA; denies feeling worsening SOB. She is continuing her HD 3 x weekly, had yesterday.   Reports she is doing better; requesting SN/PT, HHA; also received orders from [facility].    Her IO /53; taking all her meds per emr, req refills. Also taking lyrica for neuropathic pain; OARRs reviewed and appears appropriate. Last refilled on 25, req refills.     Review of Systems   Constitutional:  Negative for chills, fatigue, fever and unexpected weight change.   HENT: Negative.   " "  Respiratory:  Negative for cough, shortness of breath and wheezing.    Cardiovascular:  Negative for chest pain, palpitations and leg swelling.   Gastrointestinal:  Negative for abdominal pain, constipation, diarrhea, nausea and vomiting.   Musculoskeletal: Negative.    Skin:  Negative for color change and rash.   Neurological:  Negative for dizziness and headaches.   Psychiatric/Behavioral:  Negative for behavioral problems and confusion.        /68 (BP Location: Left arm, Patient Position: Sitting, BP Cuff Size: Adult)   Pulse 62   Temp 36.1 °C (96.9 °F) (Temporal)   Resp 16   Ht 1.549 m (5' 1\")   SpO2 97%   BMI 29.16 kg/m²     Physical Exam  Vitals and nursing note reviewed.   Constitutional:       General: She is not in acute distress.     Appearance: Normal appearance. She is not ill-appearing or toxic-appearing.      Comments: Sitting on wheel chair, lady friend in the room   Cardiovascular:      Rate and Rhythm: Normal rate and regular rhythm.      Pulses: Normal pulses.      Heart sounds: Normal heart sounds.   Pulmonary:      Effort: Pulmonary effort is normal.      Breath sounds: Normal breath sounds.   Abdominal:      General: Abdomen is flat. Bowel sounds are normal.      Palpations: Abdomen is soft.   Musculoskeletal:         General: Normal range of motion.   Neurological:      General: No focal deficit present.      Mental Status: She is alert.      Cranial Nerves: No cranial nerve deficit.      Sensory: No sensory deficit.      Motor: No weakness.   Psychiatric:         Mood and Affect: Mood normal.         Behavior: Behavior normal.         The complexity of medical decision making for this patient's transitional care is high.    Assessment/Plan   She is here for hospital disch follow up. She was in the  [place] from 3/19/25-3/26/25 for influenza A, superimposed pna and acute hypoxic respiratory failure.   Appears she is doing better, sleeping well, has good appetite and normal BM.  " She is satting well on room air and other vitals are stable.  She will continue to benefit from having skilled nursing/PT and home health aide for assistance with ADLs/IADLs.  [facility] home care order signed accordingly.    BP remains well-controlled, continue all meds per EMR, Rx refilled.  Other chronic problems are  stable, continue all meds as usual.  Neuropathic pain controlled on Lyrica 100 mg twice daily, continue same; Rx refilled.  OARRS reviewed and appears appropriate.  She will continue following with nephro and hemodialysis 3 times weekly as usual.    Problem List Items Addressed This Visit             ICD-10-CM    Hypertension, essential (Chronic) I10    Relevant Medications    losartan (Cozaar) 50 mg tablet    GERD (gastroesophageal reflux disease) (Chronic) K21.9    Relevant Medications    pantoprazole (ProtoNix) 40 mg EC tablet    Hyperlipidemia (Chronic) E78.5    Relevant Medications    atorvastatin (Lipitor) 10 mg tablet    Myelodysplastic syndrome (Multi) (Chronic) D46.9     Cont following with heme/onc          Myofascial pain syndrome M79.18    Relevant Medications    pregabalin (Lyrica) 100 mg capsule    COPD without exacerbation (Multi) (Chronic) J44.9     Stable, on current meds. Cont same          Type 2 diabetes mellitus without complications E11.9    Relevant Medications    pioglitazone (Actos) 15 mg tablet    Atrial fibrillation with rapid ventricular response (Multi) I48.91    Relevant Medications    carvedilol (Coreg) 25 mg tablet    Other specified diabetes mellitus with diabetic neuropathy, unspecified E13.40     Remains stable; cont same           Other Visit Diagnoses         Codes    Hospital discharge follow-up    -  Primary Z09    Neuropathic pain of both feet     G57.93    Relevant Medications    pregabalin (Lyrica) 100 mg capsule    Globus sensation     R09.A2    Relevant Medications    pantoprazole (ProtoNix) 40 mg EC tablet        RTC 3 months for follow-up    This note was  partially generated using the Dragon Voice recognition system. There may be some incorrect wording, spelling and/or spelling errors or punctuation errors that were not corrected prior to committing the note to the medical record.      MD STEVEN Quintana, Family Medicine

## 2025-04-02 LAB
ALBUMIN SERPL-MCNC: 3.9 G/DL (ref 3.6–5.1)
ALP SERPL-CCNC: 96 U/L (ref 37–153)
ALT SERPL-CCNC: 24 U/L (ref 6–29)
ANION GAP SERPL CALCULATED.4IONS-SCNC: 9 MMOL/L (CALC) (ref 7–17)
AST SERPL-CCNC: 25 U/L (ref 10–35)
BILIRUB SERPL-MCNC: 0.6 MG/DL (ref 0.2–1.2)
BUN SERPL-MCNC: 17 MG/DL (ref 7–25)
CALCIUM SERPL-MCNC: 8.8 MG/DL (ref 8.6–10.4)
CHLORIDE SERPL-SCNC: 95 MMOL/L (ref 98–110)
CHOLEST SERPL-MCNC: 124 MG/DL
CHOLEST/HDLC SERPL: 2.8 (CALC)
CO2 SERPL-SCNC: 33 MMOL/L (ref 20–32)
CREAT SERPL-MCNC: 2.46 MG/DL (ref 0.6–0.95)
EGFRCR SERPLBLD CKD-EPI 2021: 19 ML/MIN/1.73M2
ERYTHROCYTE [DISTWIDTH] IN BLOOD BY AUTOMATED COUNT: 19 % (ref 11–15)
GLUCOSE SERPL-MCNC: 230 MG/DL (ref 65–99)
HCT VFR BLD AUTO: 24.8 % (ref 35–45)
HDLC SERPL-MCNC: 45 MG/DL
HGB BLD-MCNC: 8 G/DL (ref 11.7–15.5)
LDLC SERPL CALC-MCNC: 58 MG/DL (CALC)
MAGNESIUM SERPL-MCNC: 1.9 MG/DL (ref 1.5–2.5)
MCH RBC QN AUTO: 31.5 PG (ref 27–33)
MCHC RBC AUTO-ENTMCNC: 32.3 G/DL (ref 32–36)
MCV RBC AUTO: 97.6 FL (ref 80–100)
NONHDLC SERPL-MCNC: 79 MG/DL (CALC)
PLATELET # BLD AUTO: 315 THOUSAND/UL (ref 140–400)
PMV BLD REES-ECKER: 12.4 FL (ref 7.5–12.5)
POTASSIUM SERPL-SCNC: 3.7 MMOL/L (ref 3.5–5.3)
PROT SERPL-MCNC: 6.4 G/DL (ref 6.1–8.1)
RBC # BLD AUTO: 2.54 MILLION/UL (ref 3.8–5.1)
SODIUM SERPL-SCNC: 137 MMOL/L (ref 135–146)
TRIGL SERPL-MCNC: 119 MG/DL
WBC # BLD AUTO: 7.7 THOUSAND/UL (ref 3.8–10.8)

## 2025-04-07 NOTE — DOCUMENTATION CLARIFICATION NOTE
"    PATIENT:               YVONNE BAEZ  ACCT #:                  [id number]  MRN:                       [id number]  :                       1945  ADMIT DATE:       3/19/2025 7:58 AM  DISCH DATE:        3/26/2025 2:13 PM  RESPONDING PROVIDER #:        [id number]          PROVIDER RESPONSE TEXT:    Viral Sepsis with multi-system organ dysfunction of elevated troponins/acute myocardial injury and acute respiratory failure    CDI QUERY TEXT:    Clarification    Instruction:  Based on your assessment of the patient and the clinical information, please provide the requested documentation by clicking on the appropriate radio button and enter any additional information if prompted.    Question: Is there a diagnosis indicative of a patient meeting SIRS criteria and with organ dysfunction in the setting of influenza A    When answering this query, please exercise your independent professional judgment. The fact that a question is being asked, does not imply that any particular answer is desired or expected.    The patient's clinical indicators include:  Clinical Information: Patient admitted with influenza with noted acute respiratory failure and elevated troponins    Clinical Indicators: Per ED, \"Acute hypoxic respiratory failure  Influenza A  Elevated troponin.\"    Per H and P, \"Acute hypoxic respiratory failure secondary to history of COPD and superimposed influenza A.\"    3/19-3/20  RR: 22, 29, 22, 22,  Temperature: 37.6, 38.4, 39.3  HR: 98, 92, 104, 98     Troponins: 17, 17     EKG: Sinus rhythm  Low voltage, precordial leads    Treatment: IV rocephin- now d/shahida, IV doxycycline- now d/shahida, tamiflu, duonebs, telemetry monitoring, EKG    Risk Factors: 79yof admitted with influenza with noted acute respiratory failure and elevated troponins  Options provided:  -- Viral Sepsis with cardiac organ dysfunction of elevated troponins/acute myocardial injury  -- Viral Sepsis with multi-system organ dysfunction of " elevated troponins/acute myocardial injury and acute respiratory failure  -- Viral Sepsis with other organ dysfunction, Please specify sepsis associated organ dysfunction below  -- Patient treated for influenza A without Sepsis  -- Other - I will add my own diagnosis  -- Refer to Clinical Documentation Reviewer    Query created by: Pauline Juarez on 4/4/2025 1:48 PM      Electronically signed by:  JEFFREY MAYER MD 4/7/2025 10:42 AM

## 2025-04-08 ENCOUNTER — LAB (OUTPATIENT)
Dept: LAB | Facility: HOSPITAL | Age: 80
End: 2025-04-08
Payer: MEDICARE

## 2025-04-08 DIAGNOSIS — D64.9 ANEMIA, UNSPECIFIED TYPE: ICD-10-CM

## 2025-04-08 DIAGNOSIS — D50.9 IRON DEFICIENCY ANEMIA, UNSPECIFIED IRON DEFICIENCY ANEMIA TYPE: ICD-10-CM

## 2025-04-08 LAB
ABO GROUP (TYPE) IN BLOOD: NORMAL
ANTIBODY SCREEN: NORMAL
ERYTHROCYTE [DISTWIDTH] IN BLOOD BY AUTOMATED COUNT: 20.9 % (ref 11.5–14.5)
HCT VFR BLD AUTO: 21.6 % (ref 36–46)
HGB BLD-MCNC: 6.6 G/DL (ref 12–16)
MCH RBC QN AUTO: 30.6 PG (ref 26–34)
MCHC RBC AUTO-ENTMCNC: 30.6 G/DL (ref 32–36)
MCV RBC AUTO: 100 FL (ref 80–100)
PLATELET # BLD AUTO: 213 X10*3/UL (ref 150–450)
RBC # BLD AUTO: 2.16 X10*6/UL (ref 4–5.2)
RH FACTOR (ANTIGEN D): NORMAL
WBC # BLD AUTO: 3.1 X10*3/UL (ref 4.4–11.3)

## 2025-04-08 PROCEDURE — 86901 BLOOD TYPING SEROLOGIC RH(D): CPT

## 2025-04-08 PROCEDURE — 86922 COMPATIBILITY TEST ANTIGLOB: CPT

## 2025-04-08 PROCEDURE — 85027 COMPLETE CBC AUTOMATED: CPT

## 2025-04-08 PROCEDURE — 36415 COLL VENOUS BLD VENIPUNCTURE: CPT

## 2025-04-09 ENCOUNTER — APPOINTMENT (OUTPATIENT)
Dept: VASCULAR MEDICINE | Facility: HOSPITAL | Age: 80
End: 2025-04-09
Payer: MEDICARE

## 2025-04-10 ENCOUNTER — APPOINTMENT (OUTPATIENT)
Dept: VASCULAR MEDICINE | Facility: HOSPITAL | Age: 80
End: 2025-04-10
Payer: MEDICARE

## 2025-04-10 ENCOUNTER — PATIENT OUTREACH (OUTPATIENT)
Dept: PRIMARY CARE | Facility: CLINIC | Age: 80
End: 2025-04-10

## 2025-04-10 ENCOUNTER — INFUSION (OUTPATIENT)
Dept: INFUSION THERAPY | Facility: HOSPITAL | Age: 80
End: 2025-04-10
Payer: MEDICARE

## 2025-04-10 ENCOUNTER — APPOINTMENT (OUTPATIENT)
Dept: VASCULAR SURGERY | Facility: HOSPITAL | Age: 80
End: 2025-04-10
Payer: MEDICARE

## 2025-04-10 VITALS
SYSTOLIC BLOOD PRESSURE: 181 MMHG | OXYGEN SATURATION: 93 % | HEART RATE: 71 BPM | DIASTOLIC BLOOD PRESSURE: 68 MMHG | RESPIRATION RATE: 20 BRPM | TEMPERATURE: 97.5 F

## 2025-04-10 DIAGNOSIS — D46.9 MYELODYSPLASTIC SYNDROME (MULTI): Primary | ICD-10-CM

## 2025-04-10 LAB
BLOOD EXPIRATION DATE: NORMAL
DISPENSE STATUS: NORMAL
PRODUCT BLOOD TYPE: 6200
PRODUCT CODE: NORMAL
UNIT ABO: NORMAL
UNIT NUMBER: NORMAL
UNIT RH: NORMAL
UNIT VOLUME: 350
XM INTEP: NORMAL

## 2025-04-10 PROCEDURE — 86902 BLOOD TYPE ANTIGEN DONOR EA: CPT

## 2025-04-10 PROCEDURE — 36430 TRANSFUSION BLD/BLD COMPNT: CPT

## 2025-04-10 PROCEDURE — P9040 RBC LEUKOREDUCED IRRADIATED: HCPCS

## 2025-04-10 RX ORDER — ALBUTEROL SULFATE 0.83 MG/ML
3 SOLUTION RESPIRATORY (INHALATION) AS NEEDED
OUTPATIENT
Start: 2025-04-10

## 2025-04-10 RX ORDER — DIPHENHYDRAMINE HYDROCHLORIDE 50 MG/ML
50 INJECTION, SOLUTION INTRAMUSCULAR; INTRAVENOUS AS NEEDED
OUTPATIENT
Start: 2025-04-10

## 2025-04-10 RX ORDER — EPINEPHRINE 0.3 MG/.3ML
0.3 INJECTION SUBCUTANEOUS EVERY 5 MIN PRN
OUTPATIENT
Start: 2025-04-10

## 2025-04-10 RX ORDER — FAMOTIDINE 10 MG/ML
20 INJECTION, SOLUTION INTRAVENOUS ONCE AS NEEDED
OUTPATIENT
Start: 2025-04-10

## 2025-04-10 ASSESSMENT — ENCOUNTER SYMPTOMS
OCCASIONAL FEELINGS OF UNSTEADINESS: 0
DEPRESSION: 0
LOSS OF SENSATION IN FEET: 0

## 2025-04-10 ASSESSMENT — PAIN SCALES - GENERAL: PAINLEVEL_OUTOF10: 8

## 2025-04-15 ENCOUNTER — OFFICE VISIT (OUTPATIENT)
Dept: HEMATOLOGY/ONCOLOGY | Facility: CLINIC | Age: 80
End: 2025-04-15
Payer: MEDICARE

## 2025-04-15 VITALS
HEART RATE: 59 BPM | BODY MASS INDEX: 29.55 KG/M2 | WEIGHT: 156.42 LBS | TEMPERATURE: 97.2 F | SYSTOLIC BLOOD PRESSURE: 159 MMHG | DIASTOLIC BLOOD PRESSURE: 58 MMHG | RESPIRATION RATE: 16 BRPM | OXYGEN SATURATION: 92 %

## 2025-04-15 DIAGNOSIS — D50.9 IRON DEFICIENCY ANEMIA, UNSPECIFIED IRON DEFICIENCY ANEMIA TYPE: ICD-10-CM

## 2025-04-15 DIAGNOSIS — D46.9 MYELODYSPLASTIC SYNDROME (MULTI): ICD-10-CM

## 2025-04-15 LAB
ABO GROUP (TYPE) IN BLOOD: NORMAL
ANTIBODY SCREEN: NORMAL
BASOPHILS # BLD AUTO: 0.04 X10*3/UL (ref 0–0.1)
BASOPHILS NFR BLD AUTO: 1 %
EOSINOPHIL # BLD AUTO: 0.11 X10*3/UL (ref 0–0.4)
EOSINOPHIL NFR BLD AUTO: 2.8 %
ERYTHROCYTE [DISTWIDTH] IN BLOOD BY AUTOMATED COUNT: 19.7 % (ref 11.5–14.5)
HCT VFR BLD AUTO: 24.3 % (ref 36–46)
HGB BLD-MCNC: 7.5 G/DL (ref 12–16)
IMM GRANULOCYTES # BLD AUTO: 0.01 X10*3/UL (ref 0–0.5)
IMM GRANULOCYTES NFR BLD AUTO: 0.3 % (ref 0–0.9)
LYMPHOCYTES # BLD AUTO: 1.46 X10*3/UL (ref 0.8–3)
LYMPHOCYTES NFR BLD AUTO: 36.6 %
MCH RBC QN AUTO: 30 PG (ref 26–34)
MCHC RBC AUTO-ENTMCNC: 30.9 G/DL (ref 32–36)
MCV RBC AUTO: 97 FL (ref 80–100)
MONOCYTES # BLD AUTO: 0.41 X10*3/UL (ref 0.05–0.8)
MONOCYTES NFR BLD AUTO: 10.3 %
NEUTROPHILS # BLD AUTO: 1.96 X10*3/UL (ref 1.6–5.5)
NEUTROPHILS NFR BLD AUTO: 49 %
PLATELET # BLD AUTO: 204 X10*3/UL (ref 150–450)
RBC # BLD AUTO: 2.5 X10*6/UL (ref 4–5.2)
RH FACTOR (ANTIGEN D): NORMAL
WBC # BLD AUTO: 4 X10*3/UL (ref 4.4–11.3)

## 2025-04-15 PROCEDURE — 1157F ADVNC CARE PLAN IN RCRD: CPT | Performed by: INTERNAL MEDICINE

## 2025-04-15 PROCEDURE — 3077F SYST BP >= 140 MM HG: CPT | Performed by: INTERNAL MEDICINE

## 2025-04-15 PROCEDURE — 85025 COMPLETE CBC W/AUTO DIFF WBC: CPT | Performed by: INTERNAL MEDICINE

## 2025-04-15 PROCEDURE — 99213 OFFICE O/P EST LOW 20 MIN: CPT | Performed by: INTERNAL MEDICINE

## 2025-04-15 PROCEDURE — 1111F DSCHRG MED/CURRENT MED MERGE: CPT | Performed by: INTERNAL MEDICINE

## 2025-04-15 PROCEDURE — 86901 BLOOD TYPING SEROLOGIC RH(D): CPT | Performed by: INTERNAL MEDICINE

## 2025-04-15 PROCEDURE — 1160F RVW MEDS BY RX/DR IN RCRD: CPT | Performed by: INTERNAL MEDICINE

## 2025-04-15 PROCEDURE — 1159F MED LIST DOCD IN RCRD: CPT | Performed by: INTERNAL MEDICINE

## 2025-04-15 PROCEDURE — 3078F DIAST BP <80 MM HG: CPT | Performed by: INTERNAL MEDICINE

## 2025-04-15 PROCEDURE — 1126F AMNT PAIN NOTED NONE PRSNT: CPT | Performed by: INTERNAL MEDICINE

## 2025-04-15 PROCEDURE — 86922 COMPATIBILITY TEST ANTIGLOB: CPT

## 2025-04-15 PROCEDURE — 36415 COLL VENOUS BLD VENIPUNCTURE: CPT | Performed by: INTERNAL MEDICINE

## 2025-04-15 PROCEDURE — 86902 BLOOD TYPE ANTIGEN DONOR EA: CPT

## 2025-04-15 ASSESSMENT — PAIN SCALES - GENERAL: PAINLEVEL_OUTOF10: 0-NO PAIN

## 2025-04-15 NOTE — PROGRESS NOTES
Tana Hargrove  Female, 79 y.o., 1945  MRN: [id number]  Medical problems  #1 myelodysplastic syndrome    Procrit 80,000 unit every week    2.  Chronic anemia due to chronic kidney disease    3.  History of iron deficiency anemia, elevated ferritin level due to frequent RBC transfusion    4.  ESRD on hemodialysis    Interval history  4/15/24  Tana Hargrove presents today for interval follow-up and treatment of anemia secondary MDS and chronic disease.     She has been receiving Procrit 80,000 weekly and tolerating well.  She denies bleeding, nausea vomiting, chest pain, abdominal pain or other new symptoms.   She is complaining of weakness fatigue, today hemoglobin 7.5    Past medical history: Anemia secondary to MDS (with minor component of myelofibrosis) and chronic disease, diagnosed in January 2013. She  has been maintained on weekly injections of Procrit, 80,000 units.. She has required transfusion intermittently. History also significant for diabetes, lumbar spinal stenosis and degenerative disc disease, hypertension, osteoporosis, GERD, hyperlipidemia,  benign breast disease, gout, hysterectomy, BSO, knee replacement surgeries, back surgery, arthritis, foot surgery, diverticulosis, colon polyp and skin cancers.  COVID-19 infection (11/20/20) .  COVID-19 vaccination (Moderna) February 2021.  LS spine  surgery March 2021.  Had Shingrix vaccine 9/2020.  Pneumovax 9/2020.  ESTEE, resolving.     Family medical history: Sister had colon cancer at age 50.        Review of Systems:   Review of Systems:    Constitutional: No fever, chills, night sweats.  Increased fatigue.    Head and neck: No headaches or dizziness.  No pain or stiffness   HEENT: No sore throat or sinusitis.  Hearing is normal and eyesight is good.  Cardiac: No chest pain or palpitations  Respiratory: No increased dyspnea, cough, hemoptysis.  Mild BERNAL.  GI: Appetite is good and weight stable.  No constipation, diarrhea.  No abdominal pain, nausea or  vomiting.  Genitourinary: No frequency or urgency.  No polyuria, dysuria.  Musculoskeletal: Chronic low back pain, knee pain, left foot pain.  Endocrine: History of diabetes; no thyroid disease.  Skin: No rash, skin lesions, itching.  Neuromuscular: no fainting or dizziness.  No history of seizure.  Occasional tremor, spasms and weakness in her extremities                 Allergies and Intolerances:       Allergies:         Demerol HCl: Drug, Unknown, Active         codeine: Drug, Unknown, Active         Vicodin: Drug, Unknown, Active         Ultram: Drug, Unknown, Active         Tape: Environment, Unknown, Active     Outpatient Medication Profile:  * Patient Currently Takes Medications as of 17-Jul-2023 15:52 documented in Structured Notes         sodium polystyrene sulfonate 15 g/60 mL  oral and rectal suspension: Last Dose Taken:  , 60 milliliter(s) orally ONCE but may repeat up to          4 times a day until loose stool. , Start Date: 12-Jul-2023         bumetanide 1 mg oral tablet: Last Dose Taken:  , 1 tab(s) orally once  a day, Start Date: 15-Dec-2022         Lyrica 100 mg oral capsule: Last Dose Taken:  , 1 cap(s) orally 2 times  a day          OARRS LF 11-22-22         60/30, Start Date: 20-May-2022         metoprolol tartrate 25 mg oral tablet: Last Dose Taken:  , 1 tab(s) orally  2 times a day, Start Date: 10-Apr-2022         aspirin 81 mg oral tablet: Last Dose Taken:  , 1 tab(s) orally once a day         pantoprazole 40 mg oral delayed release tablet: Last Dose Taken:  , 1  tab(s) orally once a day         amLODIPine 5 mg oral tablet: Last Dose Taken:  , 1 tab(s) orally once  a day         Vitamin D3 50 mcg (2000 intl units) oral tablet: Last Dose Taken:  ,  1 tab(s) orally once a day         Ventolin HFA 90 mcg/inh inhalation aerosol: Last Dose Taken:  , 2 puff(s)  inhaled 4 times a day, As Needed         allopurinol 100 mg oral tablet: Last Dose Taken:  , 1 tab(s) orally 2  times a day          pravastatin 40 mg oral tablet: Last Dose Taken:  , 1 tab(s) orally once  a day (at bedtime)         Januvia 100 mg oral tablet: Last Dose Taken:  , 1 tab(s) orally once  a day         glimepiride 4 mg oral tablet: Last Dose Taken:  , 1 tab(s) orally once  a day         acetaminophen 500 mg oral tablet: Last Dose Taken:  , 2 tab(s) orally  every 8 hours, As Needed             Medical History:         Anemia: ICD-10: D64.9, Status: Active         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      Social History:   Social Substance History:  ·  Smoking Status never smoker (1)            Vitals and Measurements:   Vitals: Temp: 36.7  HR: 83  RR: 16  BP: 140/66  SPO2%:   94   Measurements: HT(cm): 157.4  WT(kg): NA  BSA: NA   BMI:  NA      Physical Exam:      Constitutional: No acute distress.  Alert and oriented.   Appears chronically ill.   Eyes: PERRL, EOMI, clear sclera.   ENMT: mucous membranes moist, no apparent injury,  no lesions seen   Head/Neck: Unremarkable.  No JVD. Trachea midline.   Respiratory/Thorax: Clear to auscultation. Normal  breath sounds. No wheezes, rales, rhonchi.   Cardiovascular: Regular, rate and rhythm.   Gastrointestinal: Nondistended.  Normal bowel sounds.   Musculoskeletal: ROM intact.  No joint swelling.  Adequate strength. No deformity.   Extremities: Mild ankle edema.   Neurological: Alert and oriented x3. Senses and cranial  nerves grossly intact. No focal motor or sensory deficits noted.   Psychological: Appropriate mood and affect.  Slightly  depressed affect.   Skin: Warm and dry, no rashes, no suspicious lesions.         Lab Results:         15/25) 7 d ago  (4/8/25) 3 wk ago  (3/25/25) 3 wk ago  (3/24/25) 3 wk ago  (3/23/25) 3 wk ago  (3/22/25) 3 wk ago  (3/21/25)    WBC  4.4 - 11.3 x10*3/uL 4.0 Low  3.1 Low  1.9 Low  1.7 Low  1.6 Low  2.2 Low  4.1 Low    RBC  4.00 - 5.20 x10*6/uL 2.50 Low  2.16 Low   2.90 Low  2.59 Low  2.51 Low  2.18 Low  2.34 Low    Hemoglobin  12.0 - 16.0 g/dL 7.5 Low  6.6 Low  9.1 Low  8.1 Low  7.9 Low  7.0 Low  7.5 Low    Hematocrit  36.0 - 46.0 % 24.3 Low  21.6 Low  27.7 Low  24.9 Low  24.1 Low  21.0 Low  22.6 Low    MCV  80 - 100 fL 97 100 96 96 96 96 97   MCH  26.0 - 34.0 pg 30.0 30.6 31.4 31.3 31.5 32.1 32.1   MCHC  32.0 - 36.0 g/dL 30.9 Low  30.6 Low  32.9 32.5 32.8 33.3 33.2   RDW  11.5 - 14.5 % 19.7 High  20.9 High  19.1 High  19.4 High  19.9 High  21.3 High  22.6 High    Platelets  150 - 450 x10*3/uL 204                                  Assessment and Plan:   Assessment:    1.  Anemia due to myelodysplastic syndrome, renal insufficiency and chronic disease, on Procrit.  She has required transfusions intermittently, especially when hemoglobin  < 7.5.  Hgb today is 6.7. patient is very tired and weak. We scheduled her for a transfusion of 2 units PRBC's tomorrow. She will continue the Procrit 80,000units weekly for now, but I don't think she is responding. She may be at the point of being transfusion  dependent. Ferritin is elevated most likely from frequent transfusions and inflammation. Not sure if she would benefit from chelating agents.     2.  History of hypertension, arthritis, skin cancer, diabetes and other medical problems     3.  Degenerative disease of the spine, knees, hips, etc.      4.  CKD - stage 4, on Procrit as stated above.      5. On July 12, she had potassium of 6.1. K-exalate was ordered by Dr Medina over the weekend. K+ level has improved to 4.8. will keep on a low dose this week and check labs again in 1 week.       Plan, patient has chronic anemia due to myelodysplastic syndrome and chronic renal insufficiency.  Patient not responding very well to Procrit injection.  Iron study and B12 level has been stable.  Today hemoglobin 7.5.    Patient going to have RBC transfusion on Thursday, continue to monitor CBC every 2-week  Follow-up after 8-week  Time spent 20  minutes

## 2025-04-15 NOTE — PATIENT INSTRUCTIONS
Follow up with Dr. Medina for anemia.     Continue lab checks on Tuesdays every 2 weeks - next on 4/29/25.     Blood transfusions as needed on Thursdays.     Follow up with Dr. Medina in 2 months.

## 2025-04-16 DIAGNOSIS — D46.9 MYELODYSPLASTIC SYNDROME (MULTI): ICD-10-CM

## 2025-04-16 RX ORDER — ALBUTEROL SULFATE 0.83 MG/ML
3 SOLUTION RESPIRATORY (INHALATION) AS NEEDED
OUTPATIENT
Start: 2025-04-16

## 2025-04-16 RX ORDER — EPINEPHRINE 0.3 MG/.3ML
0.3 INJECTION SUBCUTANEOUS EVERY 5 MIN PRN
OUTPATIENT
Start: 2025-04-16

## 2025-04-16 RX ORDER — DIPHENHYDRAMINE HYDROCHLORIDE 50 MG/ML
50 INJECTION, SOLUTION INTRAMUSCULAR; INTRAVENOUS AS NEEDED
OUTPATIENT
Start: 2025-04-16

## 2025-04-16 RX ORDER — FAMOTIDINE 10 MG/ML
20 INJECTION, SOLUTION INTRAVENOUS ONCE AS NEEDED
OUTPATIENT
Start: 2025-04-16

## 2025-04-17 ENCOUNTER — INFUSION (OUTPATIENT)
Dept: INFUSION THERAPY | Facility: HOSPITAL | Age: 80
End: 2025-04-17
Payer: MEDICARE

## 2025-04-17 VITALS
HEART RATE: 60 BPM | DIASTOLIC BLOOD PRESSURE: 82 MMHG | TEMPERATURE: 97.4 F | OXYGEN SATURATION: 93 % | SYSTOLIC BLOOD PRESSURE: 182 MMHG | RESPIRATION RATE: 18 BRPM

## 2025-04-17 DIAGNOSIS — D46.9 MYELODYSPLASTIC SYNDROME (MULTI): ICD-10-CM

## 2025-04-17 PROCEDURE — 36430 TRANSFUSION BLD/BLD COMPNT: CPT

## 2025-04-17 PROCEDURE — P9040 RBC LEUKOREDUCED IRRADIATED: HCPCS

## 2025-04-17 RX ORDER — FAMOTIDINE 10 MG/ML
20 INJECTION, SOLUTION INTRAVENOUS ONCE AS NEEDED
OUTPATIENT
Start: 2025-04-17

## 2025-04-17 RX ORDER — DIPHENHYDRAMINE HYDROCHLORIDE 50 MG/ML
50 INJECTION, SOLUTION INTRAMUSCULAR; INTRAVENOUS AS NEEDED
OUTPATIENT
Start: 2025-04-17

## 2025-04-17 RX ORDER — ALBUTEROL SULFATE 0.83 MG/ML
3 SOLUTION RESPIRATORY (INHALATION) AS NEEDED
OUTPATIENT
Start: 2025-04-17

## 2025-04-17 RX ORDER — EPINEPHRINE 0.3 MG/.3ML
0.3 INJECTION SUBCUTANEOUS EVERY 5 MIN PRN
OUTPATIENT
Start: 2025-04-17

## 2025-04-17 RX ORDER — HEPARIN 100 UNIT/ML
500 SYRINGE INTRAVENOUS AS NEEDED
OUTPATIENT
Start: 2025-04-17

## 2025-04-17 RX ORDER — HEPARIN SODIUM,PORCINE/PF 10 UNIT/ML
50 SYRINGE (ML) INTRAVENOUS AS NEEDED
OUTPATIENT
Start: 2025-04-17

## 2025-04-17 ASSESSMENT — ENCOUNTER SYMPTOMS
LOSS OF SENSATION IN FEET: 0
OCCASIONAL FEELINGS OF UNSTEADINESS: 1
DEPRESSION: 0

## 2025-04-22 ENCOUNTER — APPOINTMENT (OUTPATIENT)
Dept: CARDIOLOGY | Facility: HOSPITAL | Age: 80
DRG: 291 | End: 2025-04-22
Payer: MEDICARE

## 2025-04-22 ENCOUNTER — APPOINTMENT (OUTPATIENT)
Dept: RADIOLOGY | Facility: HOSPITAL | Age: 80
DRG: 291 | End: 2025-04-22
Payer: MEDICARE

## 2025-04-22 ENCOUNTER — HOSPITAL ENCOUNTER (INPATIENT)
Facility: HOSPITAL | Age: 80
LOS: 3 days | Discharge: HOME HEALTH | DRG: 291 | End: 2025-04-25
Attending: EMERGENCY MEDICINE | Admitting: INTERNAL MEDICINE
Payer: MEDICARE

## 2025-04-22 DIAGNOSIS — Z87.448 HISTORY OF END STAGE RENAL DISEASE: ICD-10-CM

## 2025-04-22 DIAGNOSIS — I50.30 HEART FAILURE WITH PRESERVED EJECTION FRACTION, UNSPECIFIED HF CHRONICITY: ICD-10-CM

## 2025-04-22 DIAGNOSIS — N28.89 RENAL MASS: ICD-10-CM

## 2025-04-22 DIAGNOSIS — J96.01 ACUTE HYPOXIC RESPIRATORY FAILURE: ICD-10-CM

## 2025-04-22 DIAGNOSIS — J81.0 ACUTE PULMONARY EDEMA: Primary | ICD-10-CM

## 2025-04-22 PROBLEM — Z99.2 ESRD ON HEMODIALYSIS (MULTI): Status: ACTIVE | Noted: 2024-04-18

## 2025-04-22 PROBLEM — N18.6 ANEMIA IN CHRONIC KIDNEY DISEASE, ON CHRONIC DIALYSIS: Status: ACTIVE | Noted: 2024-08-24

## 2025-04-22 PROBLEM — Z99.81 HYPOXEMIA REQUIRING SUPPLEMENTAL OXYGEN: Status: ACTIVE | Noted: 2023-05-04

## 2025-04-22 PROBLEM — N18.6 ESRD ON HEMODIALYSIS (MULTI): Status: ACTIVE | Noted: 2024-04-18

## 2025-04-22 PROBLEM — R09.02 HYPOXEMIA REQUIRING SUPPLEMENTAL OXYGEN: Status: ACTIVE | Noted: 2023-05-04

## 2025-04-22 PROBLEM — Z99.2 ANEMIA IN CHRONIC KIDNEY DISEASE, ON CHRONIC DIALYSIS: Status: ACTIVE | Noted: 2024-08-24

## 2025-04-22 PROBLEM — D63.1 ANEMIA IN CHRONIC KIDNEY DISEASE, ON CHRONIC DIALYSIS: Status: ACTIVE | Noted: 2024-08-24

## 2025-04-22 LAB
ABO GROUP (TYPE) IN BLOOD: NORMAL
ALBUMIN SERPL BCP-MCNC: 3.7 G/DL (ref 3.4–5)
ALP SERPL-CCNC: 70 U/L (ref 33–136)
ALT SERPL W P-5'-P-CCNC: 11 U/L (ref 7–45)
ANION GAP BLDV CALCULATED.4IONS-SCNC: 7 MMOL/L (ref 10–25)
ANION GAP SERPL CALC-SCNC: 10 MMOL/L (ref 10–20)
ANTIBODY SCREEN: NORMAL
AST SERPL W P-5'-P-CCNC: 14 U/L (ref 9–39)
BASE EXCESS BLDV CALC-SCNC: 8 MMOL/L (ref -2–3)
BASOPHILS # BLD AUTO: 0.03 X10*3/UL (ref 0–0.1)
BASOPHILS NFR BLD AUTO: 0.5 %
BILIRUB DIRECT SERPL-MCNC: 0.1 MG/DL (ref 0–0.3)
BILIRUB SERPL-MCNC: 1 MG/DL (ref 0–1.2)
BODY TEMPERATURE: 37 DEGREES CELSIUS
BUN SERPL-MCNC: 21 MG/DL (ref 6–23)
CA-I BLDV-SCNC: 1.23 MMOL/L (ref 1.1–1.33)
CALCIUM SERPL-MCNC: 8.9 MG/DL (ref 8.6–10.3)
CARDIAC TROPONIN I PNL SERPL HS: 35 NG/L (ref 0–13)
CHLORIDE BLDV-SCNC: 102 MMOL/L (ref 98–107)
CHLORIDE SERPL-SCNC: 101 MMOL/L (ref 98–107)
CO2 SERPL-SCNC: 31 MMOL/L (ref 21–32)
CREAT SERPL-MCNC: 2.4 MG/DL (ref 0.5–1.05)
EGFRCR SERPLBLD CKD-EPI 2021: 20 ML/MIN/1.73M*2
EOSINOPHIL # BLD AUTO: 0.12 X10*3/UL (ref 0–0.4)
EOSINOPHIL NFR BLD AUTO: 2 %
ERYTHROCYTE [DISTWIDTH] IN BLOOD BY AUTOMATED COUNT: 18.9 % (ref 11.5–14.5)
FLUAV RNA RESP QL NAA+PROBE: NOT DETECTED
FLUBV RNA RESP QL NAA+PROBE: NOT DETECTED
GLUCOSE BLD MANUAL STRIP-MCNC: 148 MG/DL (ref 74–99)
GLUCOSE BLDV-MCNC: 166 MG/DL (ref 74–99)
GLUCOSE SERPL-MCNC: 169 MG/DL (ref 74–99)
HCO3 BLDV-SCNC: 32.7 MMOL/L (ref 22–26)
HCT VFR BLD AUTO: 23.5 % (ref 36–46)
HCT VFR BLD EST: 23 % (ref 36–46)
HGB BLD-MCNC: 7.5 G/DL (ref 12–16)
HGB BLDV-MCNC: 7.8 G/DL (ref 12–16)
IMM GRANULOCYTES # BLD AUTO: 0.04 X10*3/UL (ref 0–0.5)
IMM GRANULOCYTES NFR BLD AUTO: 0.7 % (ref 0–0.9)
INHALED O2 CONCENTRATION: 21 %
LACTATE BLDV-SCNC: 0.9 MMOL/L (ref 0.4–2)
LYMPHOCYTES # BLD AUTO: 1.09 X10*3/UL (ref 0.8–3)
LYMPHOCYTES NFR BLD AUTO: 17.8 %
MCH RBC QN AUTO: 30.2 PG (ref 26–34)
MCHC RBC AUTO-ENTMCNC: 31.9 G/DL (ref 32–36)
MCV RBC AUTO: 95 FL (ref 80–100)
MONOCYTES # BLD AUTO: 0.5 X10*3/UL (ref 0.05–0.8)
MONOCYTES NFR BLD AUTO: 8.1 %
NEUTROPHILS # BLD AUTO: 4.36 X10*3/UL (ref 1.6–5.5)
NEUTROPHILS NFR BLD AUTO: 70.9 %
NRBC BLD-RTO: 0.3 /100 WBCS (ref 0–0)
OXYHGB MFR BLDV: 86.9 % (ref 45–75)
PCO2 BLDV: 46 MM HG (ref 41–51)
PH BLDV: 7.46 PH (ref 7.33–7.43)
PLATELET # BLD AUTO: 200 X10*3/UL (ref 150–450)
PO2 BLDV: 56 MM HG (ref 35–45)
POTASSIUM BLDV-SCNC: 3.3 MMOL/L (ref 3.5–5.3)
POTASSIUM SERPL-SCNC: 3.5 MMOL/L (ref 3.5–5.3)
PROT SERPL-MCNC: 6.4 G/DL (ref 6.4–8.2)
RBC # BLD AUTO: 2.48 X10*6/UL (ref 4–5.2)
RH FACTOR (ANTIGEN D): NORMAL
SAO2 % BLDV: 90 % (ref 45–75)
SARS-COV-2 RNA RESP QL NAA+PROBE: NOT DETECTED
SODIUM BLDV-SCNC: 138 MMOL/L (ref 136–145)
SODIUM SERPL-SCNC: 138 MMOL/L (ref 136–145)
WBC # BLD AUTO: 6.1 X10*3/UL (ref 4.4–11.3)

## 2025-04-22 PROCEDURE — 93308 TTE F-UP OR LMTD: CPT | Performed by: EMERGENCY MEDICINE

## 2025-04-22 PROCEDURE — 87636 SARSCOV2 & INF A&B AMP PRB: CPT

## 2025-04-22 PROCEDURE — 93005 ELECTROCARDIOGRAM TRACING: CPT

## 2025-04-22 PROCEDURE — 36415 COLL VENOUS BLD VENIPUNCTURE: CPT

## 2025-04-22 PROCEDURE — 99223 1ST HOSP IP/OBS HIGH 75: CPT | Performed by: INTERNAL MEDICINE

## 2025-04-22 PROCEDURE — 2500000001 HC RX 250 WO HCPCS SELF ADMINISTERED DRUGS (ALT 637 FOR MEDICARE OP): Performed by: INTERNAL MEDICINE

## 2025-04-22 PROCEDURE — 82248 BILIRUBIN DIRECT: CPT

## 2025-04-22 PROCEDURE — 86922 COMPATIBILITY TEST ANTIGLOB: CPT

## 2025-04-22 PROCEDURE — 2500000001 HC RX 250 WO HCPCS SELF ADMINISTERED DRUGS (ALT 637 FOR MEDICARE OP): Performed by: REGISTERED NURSE

## 2025-04-22 PROCEDURE — 70450 CT HEAD/BRAIN W/O DYE: CPT

## 2025-04-22 PROCEDURE — 70450 CT HEAD/BRAIN W/O DYE: CPT | Performed by: RADIOLOGY

## 2025-04-22 PROCEDURE — 71045 X-RAY EXAM CHEST 1 VIEW: CPT | Mod: FOREIGN READ | Performed by: RADIOLOGY

## 2025-04-22 PROCEDURE — 71045 X-RAY EXAM CHEST 1 VIEW: CPT

## 2025-04-22 PROCEDURE — 84484 ASSAY OF TROPONIN QUANT: CPT

## 2025-04-22 PROCEDURE — 2500000004 HC RX 250 GENERAL PHARMACY W/ HCPCS (ALT 636 FOR OP/ED): Mod: JZ | Performed by: INTERNAL MEDICINE

## 2025-04-22 PROCEDURE — 96375 TX/PRO/DX INJ NEW DRUG ADDON: CPT | Mod: 59

## 2025-04-22 PROCEDURE — 71275 CT ANGIOGRAPHY CHEST: CPT

## 2025-04-22 PROCEDURE — 96374 THER/PROPH/DIAG INJ IV PUSH: CPT | Mod: 59

## 2025-04-22 PROCEDURE — 82947 ASSAY GLUCOSE BLOOD QUANT: CPT

## 2025-04-22 PROCEDURE — 82330 ASSAY OF CALCIUM: CPT

## 2025-04-22 PROCEDURE — 86901 BLOOD TYPING SEROLOGIC RH(D): CPT

## 2025-04-22 PROCEDURE — 74174 CTA ABD&PLVS W/CONTRAST: CPT | Performed by: RADIOLOGY

## 2025-04-22 PROCEDURE — 2060000001 HC INTERMEDIATE ICU ROOM DAILY

## 2025-04-22 PROCEDURE — 85025 COMPLETE CBC W/AUTO DIFF WBC: CPT

## 2025-04-22 PROCEDURE — 2550000001 HC RX 255 CONTRASTS: Mod: JZ | Performed by: EMERGENCY MEDICINE

## 2025-04-22 PROCEDURE — 2500000004 HC RX 250 GENERAL PHARMACY W/ HCPCS (ALT 636 FOR OP/ED): Performed by: EMERGENCY MEDICINE

## 2025-04-22 PROCEDURE — 71275 CT ANGIOGRAPHY CHEST: CPT | Performed by: RADIOLOGY

## 2025-04-22 PROCEDURE — 99285 EMERGENCY DEPT VISIT HI MDM: CPT | Mod: 25 | Performed by: EMERGENCY MEDICINE

## 2025-04-22 RX ORDER — PREGABALIN 50 MG/1
100 CAPSULE ORAL 2 TIMES DAILY
Status: DISCONTINUED | OUTPATIENT
Start: 2025-04-22 | End: 2025-04-22 | Stop reason: DRUGHIGH

## 2025-04-22 RX ORDER — CHOLECALCIFEROL (VITAMIN D3) 25 MCG
50 TABLET ORAL DAILY
Status: DISCONTINUED | OUTPATIENT
Start: 2025-04-23 | End: 2025-04-25 | Stop reason: HOSPADM

## 2025-04-22 RX ORDER — BISACODYL 5 MG
10 TABLET, DELAYED RELEASE (ENTERIC COATED) ORAL DAILY PRN
Status: DISCONTINUED | OUTPATIENT
Start: 2025-04-22 | End: 2025-04-25 | Stop reason: HOSPADM

## 2025-04-22 RX ORDER — INSULIN LISPRO 100 [IU]/ML
0-10 INJECTION, SOLUTION INTRAVENOUS; SUBCUTANEOUS
Status: DISCONTINUED | OUTPATIENT
Start: 2025-04-23 | End: 2025-04-25 | Stop reason: HOSPADM

## 2025-04-22 RX ORDER — FUROSEMIDE 10 MG/ML
40 INJECTION INTRAMUSCULAR; INTRAVENOUS ONCE
Status: COMPLETED | OUTPATIENT
Start: 2025-04-22 | End: 2025-04-22

## 2025-04-22 RX ORDER — POLYETHYLENE GLYCOL 3350 17 G/17G
17 POWDER, FOR SOLUTION ORAL DAILY
Status: DISCONTINUED | OUTPATIENT
Start: 2025-04-23 | End: 2025-04-25 | Stop reason: HOSPADM

## 2025-04-22 RX ORDER — LANOLIN ALCOHOL/MO/W.PET/CERES
1000 CREAM (GRAM) TOPICAL DAILY
Status: DISCONTINUED | OUTPATIENT
Start: 2025-04-23 | End: 2025-04-25 | Stop reason: HOSPADM

## 2025-04-22 RX ORDER — ATORVASTATIN CALCIUM 10 MG/1
10 TABLET, FILM COATED ORAL NIGHTLY
Status: DISCONTINUED | OUTPATIENT
Start: 2025-04-22 | End: 2025-04-25 | Stop reason: HOSPADM

## 2025-04-22 RX ORDER — AMLODIPINE BESYLATE 10 MG/1
10 TABLET ORAL DAILY
Status: DISCONTINUED | OUTPATIENT
Start: 2025-04-23 | End: 2025-04-25 | Stop reason: HOSPADM

## 2025-04-22 RX ORDER — LABETALOL HYDROCHLORIDE 5 MG/ML
20 INJECTION, SOLUTION INTRAVENOUS ONCE
Status: COMPLETED | OUTPATIENT
Start: 2025-04-22 | End: 2025-04-22

## 2025-04-22 RX ORDER — PANTOPRAZOLE SODIUM 40 MG/1
40 TABLET, DELAYED RELEASE ORAL DAILY
Status: DISCONTINUED | OUTPATIENT
Start: 2025-04-23 | End: 2025-04-25 | Stop reason: HOSPADM

## 2025-04-22 RX ORDER — ONDANSETRON HYDROCHLORIDE 2 MG/ML
4 INJECTION, SOLUTION INTRAVENOUS EVERY 8 HOURS PRN
Status: DISCONTINUED | OUTPATIENT
Start: 2025-04-22 | End: 2025-04-25 | Stop reason: HOSPADM

## 2025-04-22 RX ORDER — ONDANSETRON 4 MG/1
4 TABLET, ORALLY DISINTEGRATING ORAL EVERY 8 HOURS PRN
Status: DISCONTINUED | OUTPATIENT
Start: 2025-04-22 | End: 2025-04-25 | Stop reason: HOSPADM

## 2025-04-22 RX ORDER — DEXTROSE 50 % IN WATER (D50W) INTRAVENOUS SYRINGE
12.5
Status: DISCONTINUED | OUTPATIENT
Start: 2025-04-22 | End: 2025-04-25 | Stop reason: HOSPADM

## 2025-04-22 RX ORDER — DEXTROSE 50 % IN WATER (D50W) INTRAVENOUS SYRINGE
25
Status: DISCONTINUED | OUTPATIENT
Start: 2025-04-22 | End: 2025-04-25 | Stop reason: HOSPADM

## 2025-04-22 RX ORDER — TALC
3 POWDER (GRAM) TOPICAL NIGHTLY PRN
Status: DISCONTINUED | OUTPATIENT
Start: 2025-04-22 | End: 2025-04-25 | Stop reason: HOSPADM

## 2025-04-22 RX ORDER — ACETAMINOPHEN 325 MG/1
650 TABLET ORAL EVERY 6 HOURS PRN
Status: DISCONTINUED | OUTPATIENT
Start: 2025-04-22 | End: 2025-04-25 | Stop reason: HOSPADM

## 2025-04-22 RX ORDER — CARVEDILOL 25 MG/1
25 TABLET ORAL 2 TIMES DAILY
Status: DISCONTINUED | OUTPATIENT
Start: 2025-04-22 | End: 2025-04-25 | Stop reason: HOSPADM

## 2025-04-22 RX ORDER — HYDRALAZINE HYDROCHLORIDE 20 MG/ML
10 INJECTION INTRAMUSCULAR; INTRAVENOUS EVERY 6 HOURS PRN
Status: DISCONTINUED | OUTPATIENT
Start: 2025-04-22 | End: 2025-04-25 | Stop reason: HOSPADM

## 2025-04-22 RX ORDER — GUAIFENESIN 600 MG/1
600 TABLET, EXTENDED RELEASE ORAL EVERY 12 HOURS PRN
Status: DISCONTINUED | OUTPATIENT
Start: 2025-04-22 | End: 2025-04-25 | Stop reason: HOSPADM

## 2025-04-22 RX ORDER — HYDRALAZINE HYDROCHLORIDE 20 MG/ML
5 INJECTION INTRAMUSCULAR; INTRAVENOUS ONCE
Status: COMPLETED | OUTPATIENT
Start: 2025-04-22 | End: 2025-04-22

## 2025-04-22 RX ORDER — LOSARTAN POTASSIUM 50 MG/1
50 TABLET ORAL DAILY
Status: DISCONTINUED | OUTPATIENT
Start: 2025-04-23 | End: 2025-04-25 | Stop reason: HOSPADM

## 2025-04-22 RX ORDER — PREGABALIN 75 MG/1
75 CAPSULE ORAL DAILY
Status: DISCONTINUED | OUTPATIENT
Start: 2025-04-23 | End: 2025-04-25 | Stop reason: HOSPADM

## 2025-04-22 RX ORDER — FUROSEMIDE 10 MG/ML
40 INJECTION INTRAMUSCULAR; INTRAVENOUS DAILY
Status: DISCONTINUED | OUTPATIENT
Start: 2025-04-23 | End: 2025-04-25 | Stop reason: HOSPADM

## 2025-04-22 RX ADMIN — ACETAMINOPHEN 650 MG: 325 TABLET ORAL at 22:52

## 2025-04-22 RX ADMIN — HYDRALAZINE HYDROCHLORIDE 5 MG: 20 INJECTION INTRAMUSCULAR; INTRAVENOUS at 17:46

## 2025-04-22 RX ADMIN — IOHEXOL 100 ML: 350 INJECTION, SOLUTION INTRAVENOUS at 17:27

## 2025-04-22 RX ADMIN — ATORVASTATIN CALCIUM 10 MG: 10 TABLET, FILM COATED ORAL at 22:47

## 2025-04-22 RX ADMIN — FUROSEMIDE 40 MG: 10 INJECTION, SOLUTION INTRAMUSCULAR; INTRAVENOUS at 21:26

## 2025-04-22 RX ADMIN — CARVEDILOL 25 MG: 25 TABLET, FILM COATED ORAL at 22:47

## 2025-04-22 RX ADMIN — LABETALOL HYDROCHLORIDE 20 MG: 5 INJECTION INTRAVENOUS at 16:28

## 2025-04-22 SDOH — ECONOMIC STABILITY: HOUSING INSECURITY: AT ANY TIME IN THE PAST 12 MONTHS, WERE YOU HOMELESS OR LIVING IN A SHELTER (INCLUDING NOW)?: NO

## 2025-04-22 SDOH — ECONOMIC STABILITY: HOUSING INSECURITY: IN THE PAST 12 MONTHS, HOW MANY TIMES HAVE YOU MOVED WHERE YOU WERE LIVING?: 0

## 2025-04-22 SDOH — ECONOMIC STABILITY: INCOME INSECURITY: IN THE PAST 12 MONTHS HAS THE ELECTRIC, GAS, OIL, OR WATER COMPANY THREATENED TO SHUT OFF SERVICES IN YOUR HOME?: NO

## 2025-04-22 SDOH — ECONOMIC STABILITY: FOOD INSECURITY: WITHIN THE PAST 12 MONTHS, YOU WORRIED THAT YOUR FOOD WOULD RUN OUT BEFORE YOU GOT THE MONEY TO BUY MORE.: NEVER TRUE

## 2025-04-22 SDOH — ECONOMIC STABILITY: FOOD INSECURITY: HOW HARD IS IT FOR YOU TO PAY FOR THE VERY BASICS LIKE FOOD, HOUSING, MEDICAL CARE, AND HEATING?: NOT VERY HARD

## 2025-04-22 SDOH — SOCIAL STABILITY: SOCIAL INSECURITY: HAVE YOU HAD THOUGHTS OF HARMING ANYONE ELSE?: NO

## 2025-04-22 SDOH — SOCIAL STABILITY: SOCIAL INSECURITY: WITHIN THE LAST YEAR, HAVE YOU BEEN AFRAID OF YOUR PARTNER OR EX-PARTNER?: NO

## 2025-04-22 SDOH — SOCIAL STABILITY: SOCIAL INSECURITY: WITHIN THE LAST YEAR, HAVE YOU BEEN HUMILIATED OR EMOTIONALLY ABUSED IN OTHER WAYS BY YOUR PARTNER OR EX-PARTNER?: NO

## 2025-04-22 SDOH — ECONOMIC STABILITY: FOOD INSECURITY: WITHIN THE PAST 12 MONTHS, THE FOOD YOU BOUGHT JUST DIDN'T LAST AND YOU DIDN'T HAVE MONEY TO GET MORE.: NEVER TRUE

## 2025-04-22 SDOH — ECONOMIC STABILITY: TRANSPORTATION INSECURITY: IN THE PAST 12 MONTHS, HAS LACK OF TRANSPORTATION KEPT YOU FROM MEDICAL APPOINTMENTS OR FROM GETTING MEDICATIONS?: NO

## 2025-04-22 SDOH — ECONOMIC STABILITY: HOUSING INSECURITY: IN THE LAST 12 MONTHS, WAS THERE A TIME WHEN YOU WERE NOT ABLE TO PAY THE MORTGAGE OR RENT ON TIME?: NO

## 2025-04-22 SDOH — SOCIAL STABILITY: SOCIAL INSECURITY: WERE YOU ABLE TO COMPLETE ALL THE BEHAVIORAL HEALTH SCREENINGS?: YES

## 2025-04-22 ASSESSMENT — COGNITIVE AND FUNCTIONAL STATUS - GENERAL
PATIENT BASELINE BEDBOUND: NO
STANDING UP FROM CHAIR USING ARMS: A LITTLE
MOVING TO AND FROM BED TO CHAIR: A LITTLE
WALKING IN HOSPITAL ROOM: A LITTLE
TURNING FROM BACK TO SIDE WHILE IN FLAT BAD: A LITTLE
DAILY ACTIVITIY SCORE: 24
HELP NEEDED FOR BATHING: A LITTLE
DAILY ACTIVITIY SCORE: 19
PERSONAL GROOMING: A LITTLE
TOILETING: A LITTLE
MOBILITY SCORE: 24
MOBILITY SCORE: 19
CLIMB 3 TO 5 STEPS WITH RAILING: A LITTLE
DRESSING REGULAR LOWER BODY CLOTHING: A LITTLE
DRESSING REGULAR UPPER BODY CLOTHING: A LITTLE

## 2025-04-22 ASSESSMENT — ACTIVITIES OF DAILY LIVING (ADL)
LACK_OF_TRANSPORTATION: NO
FEEDING YOURSELF: INDEPENDENT
LACK_OF_TRANSPORTATION: NO
HEARING - RIGHT EAR: FUNCTIONAL
ADEQUATE_TO_COMPLETE_ADL: YES
ASSISTIVE_DEVICE: DENTURES LOWER;DENTURES UPPER;WALKER
TOILETING: INDEPENDENT
GROOMING: INDEPENDENT
JUDGMENT_ADEQUATE_SAFELY_COMPLETE_DAILY_ACTIVITIES: YES
PATIENT'S MEMORY ADEQUATE TO SAFELY COMPLETE DAILY ACTIVITIES?: YES
HEARING - LEFT EAR: FUNCTIONAL
WALKS IN HOME: INDEPENDENT
BATHING: INDEPENDENT
DRESSING YOURSELF: INDEPENDENT

## 2025-04-22 ASSESSMENT — PAIN SCALES - GENERAL
PAINLEVEL_OUTOF10: 0 - NO PAIN
PAINLEVEL_OUTOF10: 0 - NO PAIN
PAINLEVEL_OUTOF10: 8
PAINLEVEL_OUTOF10: 0 - NO PAIN
PAINLEVEL_OUTOF10: 0 - NO PAIN
PAINLEVEL_OUTOF10: 2

## 2025-04-22 ASSESSMENT — LIFESTYLE VARIABLES
HAVE PEOPLE ANNOYED YOU BY CRITICIZING YOUR DRINKING: NO
TOTAL SCORE: 0
EVER FELT BAD OR GUILTY ABOUT YOUR DRINKING: NO
SKIP TO QUESTIONS 9-10: 1
HOW OFTEN DO YOU HAVE 6 OR MORE DRINKS ON ONE OCCASION: NEVER
HAVE YOU EVER FELT YOU SHOULD CUT DOWN ON YOUR DRINKING: NO
PRESCIPTION_ABUSE_PAST_12_MONTHS: NO
AUDIT-C TOTAL SCORE: 0
SUBSTANCE_ABUSE_PAST_12_MONTHS: NO
EVER HAD A DRINK FIRST THING IN THE MORNING TO STEADY YOUR NERVES TO GET RID OF A HANGOVER: NO
HOW MANY STANDARD DRINKS CONTAINING ALCOHOL DO YOU HAVE ON A TYPICAL DAY: PATIENT DOES NOT DRINK
AUDIT-C TOTAL SCORE: 0
HOW OFTEN DO YOU HAVE A DRINK CONTAINING ALCOHOL: NEVER

## 2025-04-22 ASSESSMENT — PAIN - FUNCTIONAL ASSESSMENT
PAIN_FUNCTIONAL_ASSESSMENT: 0-10
PAIN_FUNCTIONAL_ASSESSMENT: 0-10

## 2025-04-22 ASSESSMENT — PAIN DESCRIPTION - LOCATION: LOCATION: HEAD

## 2025-04-22 NOTE — ED PROVIDER NOTES
Chief Complaint   Patient presents with    Shortness of Breath    Weakness, Gen       80-year-old female arrives to the emergency department chief complaint of a fall at home yesterday, chest pressure and shortness of breath.  The patient was in her kitchen yesterday after receiving a full dialysis treatment, the patient states that she fell however does not have any recollection of the fall, the patient states that she was on the floor was not able to get up and she called her son to assist her.  Since that time patient has become increasingly short of breath endorsing a diffuse chest pressure, denying any specific area of chest pain.  Patient upon arrival while sitting approximately 30 degrees was found to be hypoxic into 90 to 91% on room air, patient has no at home oxygen use.  Patient is on hemodialysis, has an extensive medical history, current with all follow-ups and medications.  Patient is alert and orient x 4 and answers questions appropriately, patient able to speak in long phrases, however with full sentences become slightly tachypneic, patient also has tachypnea with any exertion even moving in bed.        History provided by:  Patient   used: No         PmHx, PsHx, Allergies, Family Hx, social Hx reviewed as documented    A complete 10 point review of systems was performed and is negative except for as mentioned in the HPI.    Physical Exam:    General: Patient is AAOx3, appears well developed, well nourished, is a good historian, answers questions appropriately    HEENT: head normocephalic, atraumatic, PERRLA, EOMs intact, oropharynx without erythema or exudate, buccal mucosa intact without lesions, TMs unremarkable, nose is patent bilateral    Neck: supple, full ROM, negative for lymphadenopathy, JVD, thyromegaly, tracheal deviation, nuccal rigidity    Pulmonary: Diminished bilaterally likely chronic, no advantageous sounds, no accessory muscle use, able to speak full clear  sentences    Cardiac: HRRR, no murmurs, rubs or gallops    GI: soft, non-tender, non-distended, BS + x 4, no masses or organomegaly, no guarding or CVA tenderness noted, negative franco's, mcburney's    Musculoskeletal: Endorses chest tightness, full weight bearing with assistance at baseline    Skin: intact, no lesions or rashes noted, turgor is good.    Neuro: patient follow commands, cranial nerves 2-12 grossly intact, motor strengths 5/5 upper and lower extremities, DTR's and sensation are symmetrical. No focal deficits.    Rectal/: No urinary burning, urgency, change in frequency.  Patient has no rectal complaints        Medical Decision Making  This patient was seen, treated, and evaluated in conjunction with Dr. Rc Marinelli    Although the patient adamantly states that she did not strike her head with the fall, intracranial process is of priority consideration for the patient, the patient is also short of breath, hypoxic on room air with a good waveform, patient placed on 2 L nasal cannula.  The patient has mild respiratory distress appreciated with any exertion or long full sentences.  Fluid overload, flash pulmonary edema, pneumonia, or other pulmonary pathology considered among other possible causative pathology, EKG diagnostic blood work, urinalysis, chest x-ray, head CT will be used to further evaluate    Patient's EKG was done on 422 at 1522, the EKG was interpreted by the attending physician as no STEMI, the EKG is interpreted by me shows a sinus rhythm with a rate of 81 PA interval of 160 and a QTc of 484, no acute ST abnormalities appreciated    The above attending physician performed a bedside ultrasound, questionable abnormality appreciated, a CTA of chest to rule out dissection will be used to further evaluate.  Patient given hydralazine and labetalol for her hypertension upon arrival    The patient's diagnostic blood work show chronic abnormalities for the patient that wax and wane with the  status of dialysis.  The patient's blood work is consistent with previous findings, with no overt acute abnormality    The patient's x-ray shows no acute abnormality, the CT scan of the head is negative for any acute abnormality, the CT angio study shows pulmonary edema without any signs of dissection or other acute abnormality    Patient to be further treated, evaluated, and disposition by the above attending physician    Amount and/or Complexity of Data Reviewed  Labs: ordered. Decision-making details documented in ED Course.  Radiology: ordered. Decision-making details documented in ED Course.  ECG/medicine tests: ordered. Decision-making details documented in ED Course.       ED Course as of 04/22/25 2155   Tue Apr 22, 2025   1620 Pulse Ox: 100 %  New oxygen requirement [WJ]   1623 HEMOGLOBIN(!): 7.5  Stable anemia, not requiring blood transfusion at this time. [WJ]   1650 Flu A Result: Not Detected [WJ]   1735 CT angio chest abdomen pelvis  Independently reviewed, no obvious dissection flap, filling defect for pulmonary embolism.  Possible pulmonary edema.  Radiology read pending. [WJ]   1820 Asked to speak to the son. Updated need for admission and HD [WJ]      ED Course User Index  [WJ] Rc Marinelli DO         Diagnoses as of 04/22/25 2155   Acute pulmonary edema   History of end stage renal disease       The patient has had the following imaging during this ER visit: CT HEAD WO IV CONTRAST  XR CHEST 1 VIEW  POINT OF CARE ULTRASOUND  CT ANGIO CHEST ABDOMEN PELVIS  MR PANCREAS SCREENING SELF PAY  ED TO FLOOR BED REQUEST  ADMIT TO INPATIENT  MAY PARTICIPATE IN ROOM SERVICE  ELEVATE HOB  MEASURE WEIGHT  STRICT INTAKE AND OUTPUT  NOTIFY PROVIDER (PROMPT FOR PARAMETERS)  NOTIFY PROVIDER (PROMPT FOR PARAMETERS)  NOTIFY PROVIDER (PROMPT FOR PARAMETERS)  NURSING COMMUNICATION  NOTIFY PROVIDER (PROMPT FOR PARAMETERS)  NOTIFY PROVIDER (PROMPT FOR PARAMETERS)  NOTIFY PROVIDER (PROMPT FOR PARAMETERS)  IP CONSULT TO  NEPHROLOGY  ADULT DIET     Patient History   Medical History[1]  Surgical History[2]  Family History[3]  Social History[4]    ED Triage Vitals   Temperature Heart Rate Respirations BP   04/22/25 1520 04/22/25 1520 04/22/25 1520 04/22/25 1522   37.5 °C (99.5 °F) 93 (!) 22 (!) 214/84      Pulse Ox Temp Source Heart Rate Source Patient Position   04/22/25 1520 04/22/25 1520 04/22/25 1520 04/22/25 1520   (!) 93 % Tympanic Monitor Sitting      BP Location FiO2 (%)     04/22/25 1520 --     Left arm        Vitals:    04/22/25 2000 04/22/25 2030 04/22/25 2115 04/22/25 2130   BP: 161/63 (!) 187/71 (!) 185/71 169/80   BP Location:       Patient Position:       Pulse: 79 82 77 80   Resp: 19 18 18 19   Temp:       TempSrc:       SpO2: 100% 98% 95% 99%   Weight:       Height:                     [1]   Past Medical History:  Diagnosis Date    Abnormal levels of other serum enzymes     Elevated liver enzymes    Acute kidney failure     Anemia     CKD (chronic kidney disease)     COPD (chronic obstructive pulmonary disease) (Multi)     Coronary artery disease     Disease of blood and blood-forming organs, unspecified     Bone marrow disorder    HLD (hyperlipidemia)     Hypertension     MDS (myelodysplastic syndrome) (Multi)     Personal history of other diseases of the musculoskeletal system and connective tissue     History of muscle pain    Personal history of other specified conditions     History of insomnia    Personal history of other specified conditions     History of edema    Type 2 diabetes mellitus     Urinary tract infection, site not specified 10/17/2024   [2]   Past Surgical History:  Procedure Laterality Date    APPENDECTOMY      BREAST BIOPSY Left     left excisional    CHOLECYSTECTOMY  06/06/2024    partial cholecystectomy    COLONOSCOPY      HYSTERECTOMY      JOINT REPLACEMENT      MR HEAD ANGIO WO IV CONTRAST  11/20/2020    MR HEAD ANGIO WO IV CONTRAST 11/20/2020 [facility] CLINICAL LEGACY    MR NECK ANGIO WO IV  CONTRAST  11/20/2020    MR NECK ANGIO WO IV CONTRAST 11/20/2020 [facility] CLINICAL LEGACY    OOPHORECTOMY      OTHER SURGICAL HISTORY  12/13/2019    Oophorectomy bilateral    OTHER SURGICAL HISTORY  12/13/2019    Tubal ligation    OTHER SURGICAL HISTORY Bilateral 12/13/2019    Knee replacement    OTHER SURGICAL HISTORY Left 12/13/2019    Shoulder surgery    OTHER SURGICAL HISTORY  12/13/2019    Hysterectomy    OTHER SURGICAL HISTORY  12/13/2019    Lumpectomy    OTHER SURGICAL HISTORY Right 12/13/2019    Foot surgery    OTHER SURGICAL HISTORY  04/16/2021    Back surgery   [3]   Family History  Problem Relation Name Age of Onset    Heart disease Mother      Heart attack Father      Hodgkin's lymphoma Son     [4]   Social History  Tobacco Use    Smoking status: Never     Passive exposure: Never    Smokeless tobacco: Never   Vaping Use    Vaping status: Never Used   Substance Use Topics    Alcohol use: Not Currently    Drug use: Never        GAB Hernandez-CNP  04/22/25 6450

## 2025-04-22 NOTE — ED NOTES
Pt arrives to ED via ambulance from home with c/o shortness of breath and weakness. Pt had dialysis yesterday, and became short of breath during dialysis tx. Son said they applied oxygen during that time, but then sent her home. Per pt, she fell in the kitchen last night, but is not sure what happened. Pt has abrasion and bruising to right upper leg. Pt on oxygen 2 L NC since arrival to ER.    Code Status:  Full Code    HPI     Chief Complaint   Patient presents with    Shortness of Breath    Weakness, Gen       /61 (BP Location: Right arm, Patient Position: Lying)   Pulse 83   Temp 37.5 °C (99.5 °F) (Tympanic)   Resp 20   Wt 70.8 kg (156 lb)   SpO2 99%     Tasneem Coma Scale Score: 15      LDA:   Peripheral IV 04/22/25 20 G Left;Posterior Hand (Active)   Placement Date/Time: 04/22/25 1530   Hand Hygiene Completed: Yes  Size (Gauge): 20 G  Orientation: Left;Posterior  Location: Hand   Number of days: 0       Peripheral IV 04/22/25 20 G Anterior;Left;Upper Arm (Active)   Placement Date/Time: 04/22/25 1621   Hand Hygiene Completed: Yes  Size (Gauge): 20 G  Orientation: Anterior;Left;Upper  Location: Arm   Number of days: 0       External Urinary Catheter Female (Active)   Placement Date/Time: 04/22/25 1530   Placed by: Yola Gabriel RN  Hand Hygiene Completed: Yes  External Catheter Type: Female   Number of days: 0       Hemodialysis Cath 09/20/23 Double lumen Right Subclavian (Active)   Placement Date/Time: (c) 09/20/23 0000   Hemodialysis Catheter Type: Double lumen  Orientation: Right  Access Location: Subclavian   Number of days: 580        BACKGROUND  Medical History[1]  Surgical History[2]  Medications Ordered Prior to Encounter[3]     ASSESSMENT  ED Course as of 04/22/25 1840   Tue Apr 22, 2025   1620 Pulse Ox: 100 %  New oxygen requirement [WJ]   1623 HEMOGLOBIN(!): 7.5  Stable anemia, not requiring blood transfusion at this time. [WJ]   1650 Flu A Result: Not Detected [WJ]   1735 CT angio chest  abdomen pelvis  Independently reviewed, no obvious dissection flap, filling defect for pulmonary embolism.  Possible pulmonary edema.  Radiology read pending. [WJ]   1820 Asked to speak to the son. Updated need for admission and HD [WJ]      ED Course User Index  [WJ] Rc Marinelli DO       Medications Currently Running:  Continuous Medications[4]     Medications Given:  ED Medication Administration from 04/22/2025 1514 to 04/22/2025 1840         Date/Time Order Dose Route Action Action by     04/22/2025 1628 EDT labetaloL (Normodyne,Trandate) injection 20 mg 20 mg intravenous Given SANTY Gabriel     04/22/2025 1727 EDT iohexol (OMNIPaque) 350 mg iodine/mL solution 100 mL 100 mL intravenous Given Myra, GALINA     04/22/2025 1746 EDT hydrALAZINE (Apresoline) injection 5 mg 5 mg intravenous Given SANTY Gabriel                 RESULTS    Imaging:  CT angio chest abdomen pelvis   Final Result   No evidence of aortic aneurysm, dissection, or acute intramural   hematoma.        Pulmonary edema with small right and trace left pleural effusions.        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 Recommend further   evaluation with pancreas MRI.        Indeterminate left renal lesion, with suggestion of internal   enhancement. Recommend attention during follow-up on MRI.        Mild diffuse bladder wall thickening and adjacent stranding,   correlate clinically for cystitis.        Colonic diverticulosis.        Signed by: Brittney Hughes 4/22/2025 6:03 PM   Dictation workstation:   [id number]      CT head wo IV contrast   Final Result   No acute intracranial abnormality. Consider follow-up with MRI as   warranted.             Signed by: Brittney Hughes 4/22/2025 5:50 PM   Dictation workstation:   [id number]      XR chest 1 view   Final Result   No acute cardiopulmonary disease.  There is no significant change when   compared to the prior chest x-ray..    Signed by Jose Miguel Marmolejo MD      Point of Care Ultrasound    (Results Pending)      }  Labs ::99  Abnormal Labs Reviewed   CBC WITH AUTO DIFFERENTIAL - Abnormal; Notable for the following components:       Result Value    nRBC 0.3 (*)     RBC 2.48 (*)     Hemoglobin 7.5 (*)     Hematocrit 23.5 (*)     MCHC 31.9 (*)     RDW 18.9 (*)     All other components within normal limits   TROPONIN I, HIGH SENSITIVITY - Abnormal; Notable for the following components:    Troponin I, High Sensitivity 35 (*)     All other components within normal limits    Narrative:     Less than 99th percentile of normal range cutoff-                  Female and children under 18 years old <14 ng/L; Male <21 ng/L: Negative                  Repeat testing should be performed if clinically indicated.                                     Female and children under 18 years old 14-50 ng/L; Male 21-50 ng/L:                  Consistent with possible cardiac damage and possible increased clinical                   risk. Serial measurements may help to assess extent of myocardial damage.                                     >50 ng/L: Consistent with cardiac damage, increased clinical risk and                  myocardial infarction. Serial measurements may help assess extent of                   myocardial damage.                                      NOTE: Children less than 1 year old may have higher baseline troponin                   levels and results should be interpreted in conjunction with the overall                   clinical context.                                     NOTE: Troponin I testing is performed using a different                   testing methodology at [facility] than at other                   system hospitals. Direct result comparisons should only                   be made within the same method.   BLOOD GAS VENOUS FULL PANEL - Abnormal; Notable for the following components:    POCT pH, Venous 7.46 (*)     POCT pO2,  Venous 56 (*)     POCT SO2, Venous 90 (*)     POCT Oxy Hemoglobin, Venous 86.9 (*)     POCT Hematocrit Calculated, Venous 23.0 (*)     POCT Potassium, Venous 3.3 (*)     POCT Glucose, Venous 166 (*)     POCT Base Excess, Venous 8.0 (*)     POCT HCO3 Calculated, Venous 32.7 (*)     POCT Hemoglobin, Venous 7.8 (*)     POCT Anion Gap, Venous 7.0 (*)     All other components within normal limits   BASIC METABOLIC PANEL - Abnormal; Notable for the following components:    Glucose 169 (*)     Creatinine 2.40 (*)     eGFR 20 (*)     All other components within normal limits                   [1]   Past Medical History:  Diagnosis Date    Abnormal levels of other serum enzymes     Elevated liver enzymes    Acute kidney failure     Anemia     CKD (chronic kidney disease)     COPD (chronic obstructive pulmonary disease) (Multi)     Coronary artery disease     Disease of blood and blood-forming organs, unspecified     Bone marrow disorder    HLD (hyperlipidemia)     Hypertension     MDS (myelodysplastic syndrome) (Multi)     Personal history of other diseases of the musculoskeletal system and connective tissue     History of muscle pain    Personal history of other specified conditions     History of insomnia    Personal history of other specified conditions     History of edema    Type 2 diabetes mellitus     Urinary tract infection, site not specified 10/17/2024   [2]   Past Surgical History:  Procedure Laterality Date    APPENDECTOMY      BREAST BIOPSY Left     left excisional    CHOLECYSTECTOMY  06/06/2024    partial cholecystectomy    COLONOSCOPY      HYSTERECTOMY      JOINT REPLACEMENT      MR HEAD ANGIO WO IV CONTRAST  11/20/2020    MR HEAD ANGIO WO IV CONTRAST 11/20/2020 [facility] CLINICAL LEGACY    MR NECK ANGIO WO IV CONTRAST  11/20/2020    MR NECK ANGIO WO IV CONTRAST 11/20/2020 [facility] CLINICAL LEGACY    OOPHORECTOMY      OTHER SURGICAL HISTORY  12/13/2019    Oophorectomy bilateral    OTHER SURGICAL HISTORY  12/13/2019     Tubal ligation    OTHER SURGICAL HISTORY Bilateral 12/13/2019    Knee replacement    OTHER SURGICAL HISTORY Left 12/13/2019    Shoulder surgery    OTHER SURGICAL HISTORY  12/13/2019    Hysterectomy    OTHER SURGICAL HISTORY  12/13/2019    Lumpectomy    OTHER SURGICAL HISTORY Right 12/13/2019    Foot surgery    OTHER SURGICAL HISTORY  04/16/2021    Back surgery   [3]   No current facility-administered medications on file prior to encounter.     Current Outpatient Medications on File Prior to Encounter   Medication Sig Dispense Refill    allopurinol (Zyloprim) 100 mg tablet Take 1 tablet (100 mg) by mouth every 12 hours.      amLODIPine (Norvasc) 10 mg tablet Take 1 tablet (10 mg) by mouth once daily.      atorvastatin (Lipitor) 10 mg tablet Take 1 tablet (10 mg) by mouth once daily. 90 tablet 1    carvedilol (Coreg) 25 mg tablet Take 1 tablet (25 mg) by mouth 2 times a day. 180 tablet 1    cholecalciferol (Vitamin D-3) 50 MCG (2000 UT) tablet Take 1 tablet (50 mcg) by mouth once daily.      cyanocobalamin (Vitamin B-12) 1,000 mcg tablet Take 1 tablet (1,000 mcg) by mouth once daily.      losartan (Cozaar) 50 mg tablet Take 1 tablet (50 mg) by mouth once daily. 90 tablet 1    pantoprazole (ProtoNix) 40 mg EC tablet Take 1 tablet (40 mg) by mouth once daily in the morning. Take before meals. Do not crush, chew, or split. 30 tablet 2    pioglitazone (Actos) 15 mg tablet Take 1 tablet (15 mg) by mouth once daily. 90 tablet 1    pregabalin (Lyrica) 100 mg capsule Take 1 capsule (100 mg) by mouth 2 times a day. 60 capsule 0   [4]      Yola Gabriel, RN  04/22/25 [id number]

## 2025-04-22 NOTE — ED PROCEDURE NOTE
Procedure    Performed by: Rc Marinelli DO  Authorized by: Rc Marinelli DO                  Respiratory Indications: hypoxia    Procedure: Cardiac Ultrasound    Findings:    The pericardial space was visualized and was NEGATIVE for a significant pericardial effusion.  Activity: Ventricular contractions were visualized.  LV: LV systolic function was NORMAL. and low-normal range      Impression:  Cardiac: The focused cardiac ultrasound exam had ABNORMAL findings as specified.      Comments: In the aortic root, hyper echoic line present that could potentially be dissection flap versus artifact.  CT angio was ordered for further evaluation               Rc Marinelli DO  04/23/25 0964

## 2025-04-22 NOTE — ED TRIAGE NOTES
Pt to ER via ambulance with c/o shortness of breath and generalized weakness. Pt states she also fell last night, and injured her right leg.

## 2025-04-23 ENCOUNTER — APPOINTMENT (OUTPATIENT)
Dept: CARDIOLOGY | Facility: HOSPITAL | Age: 80
DRG: 291 | End: 2025-04-23
Payer: MEDICARE

## 2025-04-23 ENCOUNTER — APPOINTMENT (OUTPATIENT)
Dept: DIALYSIS | Facility: HOSPITAL | Age: 80
End: 2025-04-23
Payer: MEDICARE

## 2025-04-23 LAB
ALBUMIN SERPL BCP-MCNC: 3.2 G/DL (ref 3.4–5)
ALP SERPL-CCNC: 61 U/L (ref 33–136)
ALT SERPL W P-5'-P-CCNC: 11 U/L (ref 7–45)
ANION GAP SERPL CALC-SCNC: 9 MMOL/L (ref 10–20)
AORTIC VALVE MEAN GRADIENT: 4 MMHG
AORTIC VALVE PEAK VELOCITY: 1.56 M/S
APPEARANCE UR: ABNORMAL
AST SERPL W P-5'-P-CCNC: 12 U/L (ref 9–39)
AV PEAK GRADIENT: 10 MMHG
AVA (PEAK VEL): 2.24 CM2
AVA (VTI): 2.39 CM2
BASOPHILS # BLD AUTO: 0.02 X10*3/UL (ref 0–0.1)
BASOPHILS NFR BLD AUTO: 0.5 %
BILIRUB SERPL-MCNC: 1 MG/DL (ref 0–1.2)
BILIRUB UR STRIP.AUTO-MCNC: NEGATIVE MG/DL
BUN SERPL-MCNC: 23 MG/DL (ref 6–23)
CALCIUM SERPL-MCNC: 8.4 MG/DL (ref 8.6–10.3)
CARDIAC TROPONIN I PNL SERPL HS: 32 NG/L (ref 0–13)
CHLORIDE SERPL-SCNC: 101 MMOL/L (ref 98–107)
CK SERPL-CCNC: 18 U/L (ref 0–215)
CO2 SERPL-SCNC: 32 MMOL/L (ref 21–32)
COLOR UR: ABNORMAL
CREAT SERPL-MCNC: 2.58 MG/DL (ref 0.5–1.05)
EGFRCR SERPLBLD CKD-EPI 2021: 18 ML/MIN/1.73M*2
EJECTION FRACTION APICAL 4 CHAMBER: 59.1
EJECTION FRACTION: 64 %
EOSINOPHIL # BLD AUTO: 0.09 X10*3/UL (ref 0–0.4)
EOSINOPHIL NFR BLD AUTO: 2.3 %
ERYTHROCYTE [DISTWIDTH] IN BLOOD BY AUTOMATED COUNT: 19.3 % (ref 11.5–14.5)
GLUCOSE BLD MANUAL STRIP-MCNC: 127 MG/DL (ref 74–99)
GLUCOSE BLD MANUAL STRIP-MCNC: 137 MG/DL (ref 74–99)
GLUCOSE BLD MANUAL STRIP-MCNC: 138 MG/DL (ref 74–99)
GLUCOSE BLD MANUAL STRIP-MCNC: 156 MG/DL (ref 74–99)
GLUCOSE SERPL-MCNC: 133 MG/DL (ref 74–99)
GLUCOSE UR STRIP.AUTO-MCNC: NORMAL MG/DL
HCT VFR BLD AUTO: 19.7 % (ref 36–46)
HCT VFR BLD AUTO: 28.6 % (ref 36–46)
HGB BLD-MCNC: 6.2 G/DL (ref 12–16)
HGB BLD-MCNC: 9.1 G/DL (ref 12–16)
IMM GRANULOCYTES # BLD AUTO: 0.01 X10*3/UL (ref 0–0.5)
IMM GRANULOCYTES NFR BLD AUTO: 0.3 % (ref 0–0.9)
KETONES UR STRIP.AUTO-MCNC: NEGATIVE MG/DL
LEFT ATRIUM VOLUME AREA LENGTH INDEX BSA: 37.4 ML/M2
LEFT VENTRICLE INTERNAL DIMENSION DIASTOLE: 4.44 CM (ref 3.5–6)
LEFT VENTRICULAR OUTFLOW TRACT DIAMETER: 2.03 CM
LEUKOCYTE ESTERASE UR QL STRIP.AUTO: ABNORMAL
LV EJECTION FRACTION BIPLANE: 64 %
LYMPHOCYTES # BLD AUTO: 1.07 X10*3/UL (ref 0.8–3)
LYMPHOCYTES NFR BLD AUTO: 27.3 %
MAGNESIUM SERPL-MCNC: 1.78 MG/DL (ref 1.6–2.4)
MCH RBC QN AUTO: 30 PG (ref 26–34)
MCHC RBC AUTO-ENTMCNC: 31.5 G/DL (ref 32–36)
MCV RBC AUTO: 95 FL (ref 80–100)
MITRAL VALVE E/A RATIO: 1.21
MONOCYTES # BLD AUTO: 0.35 X10*3/UL (ref 0.05–0.8)
MONOCYTES NFR BLD AUTO: 8.9 %
MUCOUS THREADS #/AREA URNS AUTO: ABNORMAL /LPF
NEUTROPHILS # BLD AUTO: 2.38 X10*3/UL (ref 1.6–5.5)
NEUTROPHILS NFR BLD AUTO: 60.7 %
NITRITE UR QL STRIP.AUTO: NEGATIVE
NRBC BLD-RTO: 0 /100 WBCS (ref 0–0)
PH UR STRIP.AUTO: 6.5 [PH]
PHOSPHATE SERPL-MCNC: 4.8 MG/DL (ref 2.5–4.9)
PLATELET # BLD AUTO: 177 X10*3/UL (ref 150–450)
POTASSIUM SERPL-SCNC: 3.3 MMOL/L (ref 3.5–5.3)
PROT SERPL-MCNC: 5.5 G/DL (ref 6.4–8.2)
PROT UR STRIP.AUTO-MCNC: ABNORMAL MG/DL
RBC # BLD AUTO: 2.07 X10*6/UL (ref 4–5.2)
RBC # UR STRIP.AUTO: ABNORMAL MG/DL
RBC #/AREA URNS AUTO: ABNORMAL /HPF
RIGHT VENTRICLE PEAK SYSTOLIC PRESSURE: 42.9 MMHG
SODIUM SERPL-SCNC: 139 MMOL/L (ref 136–145)
SP GR UR STRIP.AUTO: 1.03
SQUAMOUS #/AREA URNS AUTO: ABNORMAL /HPF
TSH SERPL-ACNC: 1.51 MIU/L (ref 0.44–3.98)
UROBILINOGEN UR STRIP.AUTO-MCNC: NORMAL MG/DL
WBC # BLD AUTO: 3.9 X10*3/UL (ref 4.4–11.3)
WBC #/AREA URNS AUTO: >50 /HPF

## 2025-04-23 PROCEDURE — 82947 ASSAY GLUCOSE BLOOD QUANT: CPT

## 2025-04-23 PROCEDURE — 2500000001 HC RX 250 WO HCPCS SELF ADMINISTERED DRUGS (ALT 637 FOR MEDICARE OP): Performed by: REGISTERED NURSE

## 2025-04-23 PROCEDURE — 82550 ASSAY OF CK (CPK): CPT | Performed by: INTERNAL MEDICINE

## 2025-04-23 PROCEDURE — 2500000001 HC RX 250 WO HCPCS SELF ADMINISTERED DRUGS (ALT 637 FOR MEDICARE OP): Performed by: INTERNAL MEDICINE

## 2025-04-23 PROCEDURE — 80053 COMPREHEN METABOLIC PANEL: CPT | Performed by: INTERNAL MEDICINE

## 2025-04-23 PROCEDURE — 87081 CULTURE SCREEN ONLY: CPT | Mod: PORLAB | Performed by: NURSE PRACTITIONER

## 2025-04-23 PROCEDURE — P9040 RBC LEUKOREDUCED IRRADIATED: HCPCS

## 2025-04-23 PROCEDURE — 87086 URINE CULTURE/COLONY COUNT: CPT | Mod: PORLAB

## 2025-04-23 PROCEDURE — 93306 TTE W/DOPPLER COMPLETE: CPT | Performed by: INTERNAL MEDICINE

## 2025-04-23 PROCEDURE — 84443 ASSAY THYROID STIM HORMONE: CPT | Performed by: INTERNAL MEDICINE

## 2025-04-23 PROCEDURE — 84484 ASSAY OF TROPONIN QUANT: CPT | Performed by: INTERNAL MEDICINE

## 2025-04-23 PROCEDURE — 36415 COLL VENOUS BLD VENIPUNCTURE: CPT | Performed by: INTERNAL MEDICINE

## 2025-04-23 PROCEDURE — 8010000001 HC DIALYSIS - HEMODIALYSIS PER DAY

## 2025-04-23 PROCEDURE — 2500000004 HC RX 250 GENERAL PHARMACY W/ HCPCS (ALT 636 FOR OP/ED): Mod: JZ | Performed by: INTERNAL MEDICINE

## 2025-04-23 PROCEDURE — 99232 SBSQ HOSP IP/OBS MODERATE 35: CPT | Performed by: NURSE PRACTITIONER

## 2025-04-23 PROCEDURE — 81003 URINALYSIS AUTO W/O SCOPE: CPT

## 2025-04-23 PROCEDURE — 36415 COLL VENOUS BLD VENIPUNCTURE: CPT | Performed by: NURSE PRACTITIONER

## 2025-04-23 PROCEDURE — 83735 ASSAY OF MAGNESIUM: CPT | Performed by: INTERNAL MEDICINE

## 2025-04-23 PROCEDURE — 2060000001 HC INTERMEDIATE ICU ROOM DAILY

## 2025-04-23 PROCEDURE — 2500000001 HC RX 250 WO HCPCS SELF ADMINISTERED DRUGS (ALT 637 FOR MEDICARE OP): Performed by: NURSE PRACTITIONER

## 2025-04-23 PROCEDURE — 93306 TTE W/DOPPLER COMPLETE: CPT

## 2025-04-23 PROCEDURE — 99222 1ST HOSP IP/OBS MODERATE 55: CPT | Performed by: INTERNAL MEDICINE

## 2025-04-23 PROCEDURE — 2500000004 HC RX 250 GENERAL PHARMACY W/ HCPCS (ALT 636 FOR OP/ED): Performed by: INTERNAL MEDICINE

## 2025-04-23 PROCEDURE — 85025 COMPLETE CBC W/AUTO DIFF WBC: CPT | Performed by: INTERNAL MEDICINE

## 2025-04-23 PROCEDURE — 36430 TRANSFUSION BLD/BLD COMPNT: CPT

## 2025-04-23 PROCEDURE — 85014 HEMATOCRIT: CPT | Performed by: NURSE PRACTITIONER

## 2025-04-23 PROCEDURE — 84100 ASSAY OF PHOSPHORUS: CPT | Performed by: INTERNAL MEDICINE

## 2025-04-23 RX ORDER — ONDANSETRON 4 MG/1
4 TABLET, FILM COATED ORAL EVERY 8 HOURS PRN
Status: DISCONTINUED | OUTPATIENT
Start: 2025-04-23 | End: 2025-04-25 | Stop reason: HOSPADM

## 2025-04-23 RX ORDER — HEPARIN SODIUM 1000 [USP'U]/ML
2000 INJECTION, SOLUTION INTRAVENOUS; SUBCUTANEOUS
Status: DISCONTINUED | OUTPATIENT
Start: 2025-04-23 | End: 2025-04-25 | Stop reason: HOSPADM

## 2025-04-23 RX ORDER — POTASSIUM CHLORIDE 1.5 G/1.58G
20 POWDER, FOR SOLUTION ORAL ONCE
Status: COMPLETED | OUTPATIENT
Start: 2025-04-23 | End: 2025-04-23

## 2025-04-23 RX ORDER — CYCLOBENZAPRINE HCL 10 MG
5 TABLET ORAL 3 TIMES DAILY PRN
Status: DISCONTINUED | OUTPATIENT
Start: 2025-04-23 | End: 2025-04-25 | Stop reason: HOSPADM

## 2025-04-23 RX ORDER — ONDANSETRON HYDROCHLORIDE 2 MG/ML
4 INJECTION, SOLUTION INTRAVENOUS EVERY 8 HOURS PRN
Status: DISCONTINUED | OUTPATIENT
Start: 2025-04-23 | End: 2025-04-25 | Stop reason: HOSPADM

## 2025-04-23 RX ADMIN — Medication 1000 MCG: at 13:23

## 2025-04-23 RX ADMIN — ATORVASTATIN CALCIUM 10 MG: 10 TABLET, FILM COATED ORAL at 21:10

## 2025-04-23 RX ADMIN — HEPARIN SODIUM 1600 UNITS: 1000 INJECTION, SOLUTION INTRAVENOUS; SUBCUTANEOUS at 13:11

## 2025-04-23 RX ADMIN — CYCLOBENZAPRINE 5 MG: 10 TABLET, FILM COATED ORAL at 16:35

## 2025-04-23 RX ADMIN — CARVEDILOL 25 MG: 25 TABLET, FILM COATED ORAL at 13:24

## 2025-04-23 RX ADMIN — ACETAMINOPHEN 650 MG: 325 TABLET ORAL at 13:21

## 2025-04-23 RX ADMIN — FUROSEMIDE 40 MG: 10 INJECTION, SOLUTION INTRAMUSCULAR; INTRAVENOUS at 13:25

## 2025-04-23 RX ADMIN — LOSARTAN POTASSIUM 50 MG: 50 TABLET, FILM COATED ORAL at 13:24

## 2025-04-23 RX ADMIN — Medication 50 MCG: at 13:22

## 2025-04-23 RX ADMIN — AMLODIPINE BESYLATE 10 MG: 10 TABLET ORAL at 13:25

## 2025-04-23 RX ADMIN — HEPARIN SODIUM 1600 UNITS: 1000 INJECTION, SOLUTION INTRAVENOUS; SUBCUTANEOUS at 13:13

## 2025-04-23 RX ADMIN — PREGABALIN 75 MG: 75 CAPSULE ORAL at 13:24

## 2025-04-23 RX ADMIN — PANTOPRAZOLE SODIUM 40 MG: 40 TABLET, DELAYED RELEASE ORAL at 13:24

## 2025-04-23 RX ADMIN — POTASSIUM CHLORIDE 20 MEQ: 1.5 POWDER, FOR SOLUTION ORAL at 06:08

## 2025-04-23 RX ADMIN — CARVEDILOL 25 MG: 25 TABLET, FILM COATED ORAL at 21:10

## 2025-04-23 ASSESSMENT — PAIN - FUNCTIONAL ASSESSMENT
PAIN_FUNCTIONAL_ASSESSMENT: 0-10
PAIN_FUNCTIONAL_ASSESSMENT: UNABLE TO SELF-REPORT
PAIN_FUNCTIONAL_ASSESSMENT: NO/DENIES PAIN

## 2025-04-23 ASSESSMENT — ACTIVITIES OF DAILY LIVING (ADL): LACK_OF_TRANSPORTATION: NO

## 2025-04-23 ASSESSMENT — PAIN SCALES - GENERAL: PAINLEVEL_OUTOF10: 0 - NO PAIN

## 2025-04-23 NOTE — PROGRESS NOTES
Physical Therapy                 Therapy Communication Note    Patient Name: Tana Hargrove  MRN: [id number]  Department: [facility] DIALYSIS  Room: 2012/2012-A  Today's Date: 4/23/2025     Discipline: Physical Therapy    PT Missed Visit: Yes     Missed Visit Reason: Missed Visit Reason: Patient in a medical procedure (at dialysis, reattempt later today)    Missed Time: Attempt    Comment:

## 2025-04-23 NOTE — PRE-PROCEDURE NOTE
Report from Sending RN:    Report From: Florian Hunt RN  Recent Surgery of Procedure: No  Baseline Level of Consciousness (LOC): x4  Oxygen Use: Yes, 2L  Type: NC  Diabetic: Yes  Last BP Med Given Day of Dialysis: See MAR  Last Pain Med Given: See MAR  Lab Tests to be Obtained with Dialysis: Yes, post HD/ 1 Unit of blood  Blood Transfusion to be Given During Dialysis: Yes  Available IV Access: Yes  Medications to be Administered During Dialysis: No  Continuous IV Infusion Running: No  Restraints on Currently or in the Last 24 Hours: No  Hand-Off Communication: Pt stable and ready for dialysis  Dialysis Catheter Dressing: Will check prior to HD

## 2025-04-23 NOTE — PROGRESS NOTES
Occupational Therapy                 Therapy Communication Note    Patient Name: Tana Hargrove  MRN: [id number]  Department: [facility]IAL[facility]  Room: 2012/2012-A  Today's Date: 4/23/2025     Discipline: Occupational Therapy    OT Missed Visit: Yes     Missed Visit Reason: Missed Visit Reason: Patient in a medical procedure, Patient placed on medical hold (OT eval attempted. pt to receive blood transfusion and dialysis this morning. currently in dialysis. RN requesting to defer therapy evaluation until treatments completed.)    Missed Time: Attempt    Comment:

## 2025-04-23 NOTE — ASSESSMENT & PLAN NOTE
Monitor CBC indicis.  Hb currently at baseline, but will transfuse 1 unit PRBC if Hb less than 7.0.

## 2025-04-23 NOTE — ASSESSMENT & PLAN NOTE
Acute hypoxic respiratory failure  Likely secondary to acute on chronic diastolic heart failure, acute pulmonary edema  TTE showed normal EF  CT angio chest reviewed showing pulmonary edema and effusions  Monitor on telemetry  Supplemental oxygen, wean as tolerated  Diuresis    Acute on chronic diastolic heart failure  Resume home cardiac medications  Daily weights  Strict I/Os  BNP  Echocardiogram  Diuresis  Consult cardiology, appreciate recommendations

## 2025-04-23 NOTE — ASSESSMENT & PLAN NOTE
Continue antihypertensives  Monitor blood pressure  Cardiology following, appreciate recommendations

## 2025-04-23 NOTE — ASSESSMENT & PLAN NOTE
Due to medication noncompliance.  ED presentation with hypertensive crisis, ? consequent flash pulmonary edema.  Resume her usual antihypertensive regimen and optimize accordingly.  Give parenteral antihypertensive as needed for hypertensive emergency.  I emphasized to the patient the importance of compliance with prescribed medications .

## 2025-04-23 NOTE — PROGRESS NOTES
Social work consult placed for discharge planning. SW reviewed pt's chart and communicated with TCC. No SW needs foreseen at this time. SW signing off; available upon request.    COREY Magallanes ([id number])   Care Transitions

## 2025-04-23 NOTE — PROGRESS NOTES
04/23/25 1414   Discharge Planning   Living Arrangements Alone   Support Systems Children   Assistance Needed Independent with Walker   Type of Residence Private residence   Home or Post Acute Services In home services   Type of Home Care Services Home nursing visits;Home OT;Home PT   Expected Discharge Disposition Home Health   Does the patient need discharge transport arranged? Yes   RoundTrip coordination needed? Yes   Has discharge transport been arranged? No   Financial Resource Strain   How hard is it for you to pay for the very basics like food, housing, medical care, and heating? Not hard   Housing Stability   In the last 12 months, was there a time when you were not able to pay the mortgage or rent on time? N   At any time in the past 12 months, were you homeless or living in a shelter (including now)? N   Transportation Needs   In the past 12 months, has lack of transportation kept you from medical appointments or from getting medications? no   In the past 12 months, has lack of transportation kept you from meetings, work, or from getting things needed for daily living? No   Patient Choice   Provider Choice list and CMS website (https://medicare.gov/care-compare#search) for post-acute Quality and Resource Measure Data were provided and reviewed with: Patient   Patient / Family choosing to utilize agency / facility established prior to hospitalization Yes     PCP is Carlos Higgins. Patient is from home alone with support from her daughter. She states that she is independent with her rollator, cooking, cleaning, and self care. She states that she had increased weakness for 1 day prior to admission. Patient is on oxygen, none at baseline, if unable to wean to room air, patient will need O2 evaluation prior to discharge. She is a ESRD MWF HD at [facility] at noon and her friend transports her. PT/OT pending. She is currently is active with [facility] and would prefer to return home with resumption of  [facility]. She is declining HHC at this time. She denies any other home going needs at this time. [facility] will continue to follow for needs if they arise.

## 2025-04-23 NOTE — PROGRESS NOTES
Physical Therapy                 Therapy Communication Note    Patient Name: Tana Hargrove  MRN: [id number]  Department: [facility] NONV1  Room: 2012/2012-A  Today's Date: 4/23/2025     Discipline: Physical Therapy    PT Missed Visit: Yes     Missed Visit Reason: Missed Visit Reason: Patient refused (too fatigued post dialysis, reattempt tomorrow)    Missed Time: Attempt    Comment:

## 2025-04-23 NOTE — PROGRESS NOTES
Medication Adjustment    The following medication(s) was/were adjusted for Tana Hargrove after discussion with the provider due to altered renal function.    Medication(s) adjusted:   Pregabalin 100 mg twice daily decreased to 75 mg daily for ESRD on HD.    Jalyn Grace, PharmD

## 2025-04-23 NOTE — CONSULTS
Nephrology Consult Note                                                                                                                                         Inpatient consult to Nephrology  Consult performed by: Pinky Christie DO  Consult ordered by: South Diaz MD                                                                                                               HPI  Patient is a 80 y.o. female history of mild dysplastic syndrome, ESRD, DM 2, HFpEF who is admitted to hospital with complaints of   shortness of breath. Nephrology consulted in view of ESRD.   Patient had a blood transfusion as an outpatient last week.  She was not have any shortness of breath after that.  Her shortness of breath happened 2 days prior to admission.  She does admit to some dietary indiscretion during Easter weekend.  She has been eating well.  She just finished dialysis and 2 L UF.  Normally she does not have much weight gain in between dialysis.  She did have evidence of pulmonary edema and pleural effusions on chest x-ray.  She is feeling better now.  Hemoglobin is 6.2.    Medical History[1]   Social History     Socioeconomic History    Marital status:      Spouse name: Not on file    Number of children: Not on file    Years of education: Not on file    Highest education level: Not on file   Occupational History    Not on file   Tobacco Use    Smoking status: Never     Passive exposure: Never    Smokeless tobacco: Never   Vaping Use    Vaping status: Never Used   Substance and Sexual Activity    Alcohol use: Not Currently    Drug use: Never    Sexual activity: Not Currently   Other Topics Concern    Not on file   Social History Narrative    Not on file     Social Drivers of Health     Financial Resource Strain: Low Risk  (4/22/2025)    Overall Financial Resource Strain  "(CARDIA)     Difficulty of Paying Living Expenses: Not very hard   Food Insecurity: No Food Insecurity (4/22/2025)    Hunger Vital Sign     Worried About Running Out of Food in the Last Year: Never true     Ran Out of Food in the Last Year: Never true   Transportation Needs: No Transportation Needs (4/22/2025)    PRAPARE - Transportation     Lack of Transportation (Medical): No     Lack of Transportation (Non-Medical): No   Physical Activity: Inactive (10/9/2024)    Exercise Vital Sign     Days of Exercise per Week: 0 days     Minutes of Exercise per Session: 0 min   Stress: No Stress Concern Present (10/9/2024)    [employer] - Occupational Stress Questionnaire     Feeling of Stress : Only a little   Social Connections: Feeling Socially Integrated (10/17/2024)    OASIS : Social Isolation     Frequency of experiencing loneliness or isolation: Never   Intimate Partner Violence: Not At Risk (4/22/2025)    Humiliation, Afraid, Rape, and Kick questionnaire     Fear of Current or Ex-Partner: No     Emotionally Abused: No     Physically Abused: No     Sexually Abused: No   Housing Stability: Low Risk  (4/22/2025)    Housing Stability Vital Sign     Unable to Pay for Housing in the Last Year: No     Number of Times Moved in the Last Year: 0     Homeless in the Last Year: No      Family History[2]   Medications Ordered Prior to Encounter[3]   Scheduled medications  Scheduled Medications[4]  Continuous medications  Continuous Medications[5]  PRN medications  PRN Medications[6]     Review of systems  as per HPI otherwise 10 point review systems negative    /68 (BP Location: Right arm, Patient Position: Lying)   Pulse 66   Temp 36.5 °C (97.7 °F) (Temporal)   Resp 18   Ht 1.549 m (5' 1\")   Wt 72.2 kg (159 lb 2.8 oz)   SpO2 95%   BMI 30.08 kg/m²     Input / Output:  24 HR:   Intake/Output Summary (Last 24 hours) at 4/23/2025 1341  Last data filed at 4/23/2025 1243  Gross per 24 hour "   Intake 1240 ml   Output 2524 ml   Net -1284 ml       Physical Exam   Alert and oriented x s3, NAD  EOMI  OP clear  Neck: supple, No JVD right IJ TDC  CV: RRR without m/r/g  Lungs: CTA bilaterally diminished at bases  Abd: soft NT/ND +BS  Ext: no lower extremity edema   Neuro: grossly intact  Skin: no rashes    Results from last 7 days   Lab Units 04/23/25  0325 04/22/25  1538   SODIUM mmol/L 139 138   POTASSIUM mmol/L 3.3* 3.5   CHLORIDE mmol/L 101 101   CO2 mmol/L 32 31   BUN mg/dL 23 21   CREATININE mg/dL 2.58* 2.40*   GLUCOSE mg/dL 133* 169*   CALCIUM mg/dL 8.4* 8.9        Results from last 7 days   Lab Units 04/23/25  0325   SODIUM mmol/L 139   POTASSIUM mmol/L 3.3*   CHLORIDE mmol/L 101   CO2 mmol/L 32   BUN mg/dL 23   CREATININE mg/dL 2.58*   CALCIUM mg/dL 8.4*   PROTEIN TOTAL g/dL 5.5*   BILIRUBIN TOTAL mg/dL 1.0   ALK PHOS U/L 61   ALT U/L 11   AST U/L 12   GLUCOSE mg/dL 133*      Results from last 7 days   Lab Units 04/23/25  0325   MAGNESIUM mg/dL 1.78      Results from last 7 days   Lab Units 04/23/25  1307 04/23/25  0325 04/22/25  1538   WBC AUTO x10*3/uL  --  3.9* 6.1   HEMOGLOBIN g/dL 9.1* 6.2* 7.5*   HEMATOCRIT % 28.6* 19.7* 23.5*   PLATELETS AUTO x10*3/uL  --  177 200        CT angio chest abdomen pelvis   Final Result   No evidence of aortic aneurysm, dissection, or acute intramural   hematoma.        Pulmonary edema with small right and trace left pleural effusions.        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 Recommend further   evaluation with pancreas MRI.        Indeterminate left renal lesion, with suggestion of internal   enhancement. Recommend attention during follow-up on MRI.        Mild diffuse bladder wall thickening and adjacent stranding,   correlate clinically for cystitis.        Colonic diverticulosis.        Signed by: Brittney Hughes 4/22/2025 6:03 PM   Dictation workstation:   [id number]      CT head  wo IV contrast   Final Result   No acute intracranial abnormality. Consider follow-up with MRI as   warranted.             Signed by: Brittney Hughes 4/22/2025 5:50 PM   Dictation workstation:   [id number]      XR chest 1 view   Final Result   No acute cardiopulmonary disease.  There is no significant change when   compared to the prior chest x-ray..   Signed by Jose Miguel Marmolejo MD           Assessment:   Patient is 80 y.o. female who is admitted to hospital for acute on chronic HFpEF. Nephrology consulted in view of ESRD.    ESRD  - HD [facility] on Monday Wednesday Friday  -Access is right IJ TDC  - Patient is compliant with dialysis treatment    Anemia of ESRD and MDS  - Requires frequent PRBCs transfusion and is on high-dose MARILYNN with dialysis  - Transfuse when symptomatic or when hemoglobin less than 7    CKD-MBD  -Patient not currently on a phosphorus binder  -Calcium is normal  DM2      Recommendations:   - 2 L UF with dialysis today, she is feeling better   -noted IV Lasix ordered, she does still make urine but volume primarily managed through dialysis  - Continue to challenge and reestablish dry weight if needed      Please message me through Trapit chat with any questions or concerns.     Pinky Christie DO  4/23/2025  1:41 PM         [facility]    [address], [address]   [place] [address]  Office: [phone]                    [1]   Past Medical History:  Diagnosis Date    Abnormal levels of other serum enzymes     Elevated liver enzymes    Acute kidney failure     Anemia     CKD (chronic kidney disease)     COPD (chronic obstructive pulmonary disease) (Multi)     Coronary artery disease     Disease of blood and blood-forming organs, unspecified     Bone marrow disorder    HLD (hyperlipidemia)     Hypertension     MDS (myelodysplastic syndrome) (Multi)     Personal history of other diseases of the musculoskeletal system and connective tissue     History of muscle pain     Personal history of other specified conditions     History of insomnia    Personal history of other specified conditions     History of edema    Type 2 diabetes mellitus     Urinary tract infection, site not specified 10/17/2024   [2]   Family History  Problem Relation Name Age of Onset    Heart disease Mother      Heart attack Father      Hodgkin's lymphoma Son     [3]   No current facility-administered medications on file prior to encounter.     Current Outpatient Medications on File Prior to Encounter   Medication Sig Dispense Refill    allopurinol (Zyloprim) 100 mg tablet Take 1 tablet (100 mg) by mouth every 12 hours.      amLODIPine (Norvasc) 10 mg tablet Take 1 tablet (10 mg) by mouth once daily.      atorvastatin (Lipitor) 10 mg tablet Take 1 tablet (10 mg) by mouth once daily. 90 tablet 1    carvedilol (Coreg) 25 mg tablet Take 1 tablet (25 mg) by mouth 2 times a day. 180 tablet 1    cholecalciferol (Vitamin D-3) 50 MCG (2000 UT) tablet Take 1 tablet (50 mcg) by mouth once daily.      cyanocobalamin (Vitamin B-12) 1,000 mcg tablet Take 1 tablet (1,000 mcg) by mouth once daily.      losartan (Cozaar) 50 mg tablet Take 1 tablet (50 mg) by mouth once daily. 90 tablet 1    pantoprazole (ProtoNix) 40 mg EC tablet Take 1 tablet (40 mg) by mouth once daily in the morning. Take before meals. Do not crush, chew, or split. 30 tablet 2    pioglitazone (Actos) 15 mg tablet Take 1 tablet (15 mg) by mouth once daily. 90 tablet 1    pregabalin (Lyrica) 100 mg capsule Take 1 capsule (100 mg) by mouth 2 times a day. 60 capsule 0   [4] amLODIPine, 10 mg, oral, Daily  atorvastatin, 10 mg, oral, Nightly  carvedilol, 25 mg, oral, BID  cholecalciferol, 50 mcg, oral, Daily  cyanocobalamin, 1,000 mcg, oral, Daily  furosemide, 40 mg, intravenous, Daily  heparin, 2,000 Units, intra-catheter, After Dialysis  heparin, 2,000 Units, intra-catheter, After Dialysis  insulin lispro, 0-10 Units, subcutaneous, TID AC  losartan, 50 mg, oral,  Daily  pantoprazole, 40 mg, oral, Daily  polyethylene glycol, 17 g, oral, Daily  pregabalin, 75 mg, oral, Daily  [5]    [6] PRN medications: acetaminophen, bisacodyl, dextrose, dextrose, glucagon, glucagon, guaiFENesin, hydrALAZINE, melatonin, ondansetron ODT **OR** ondansetron

## 2025-04-23 NOTE — PROGRESS NOTES
Tana Hargrove is a 80 y.o. female admitted for Hypoxemia requiring supplemental oxygen. Pharmacy reviewed the patient's [id number] medications and allergies for accuracy.    The list below reflects the PTA list prior to pharmacy medication history. A summary a changes to the PTA medication list has been listed below. Please review each medication in order reconciliation for additional clarification and justification.    Source of information: t2p     Medications added:    Medications modified:    Medications to be removed:    Medications of concern:  Allopurinol 100mg- last filled 12/20 [id number]  Norvasc 10mg- last filled 12/17 [id number]      Prior to Admission Medications   Prescriptions Last Dose Informant Patient Reported? Taking?   allopurinol (Zyloprim) 100 mg tablet   Yes No   Sig: Take 1 tablet (100 mg) by mouth every 12 hours.   amLODIPine (Norvasc) 10 mg tablet   Yes No   Sig: Take 1 tablet (10 mg) by mouth once daily.   atorvastatin (Lipitor) 10 mg tablet   No No   Sig: Take 1 tablet (10 mg) by mouth once daily.   carvedilol (Coreg) 25 mg tablet   No No   Sig: Take 1 tablet (25 mg) by mouth 2 times a day.   cholecalciferol (Vitamin D-3) 50 MCG (2000 UT) tablet   Yes No   Sig: Take 1 tablet (50 mcg) by mouth once daily.   cyanocobalamin (Vitamin B-12) 1,000 mcg tablet   Yes No   Sig: Take 1 tablet (1,000 mcg) by mouth once daily.   losartan (Cozaar) 50 mg tablet   No No   Sig: Take 1 tablet (50 mg) by mouth once daily.   pantoprazole (ProtoNix) 40 mg EC tablet   No No   Sig: Take 1 tablet (40 mg) by mouth once daily in the morning. Take before meals. Do not crush, chew, or split.   pioglitazone (Actos) 15 mg tablet   No No   Sig: Take 1 tablet (15 mg) by mouth once daily.   pregabalin (Lyrica) 100 mg capsule   No No   Sig: Take 1 capsule (100 mg) by mouth 2 times a day.      Facility-Administered Medications: None       VIVEK FRAZIER

## 2025-04-23 NOTE — ASSESSMENT & PLAN NOTE
Due to acute CHF per CT findings.  Admit patient to telemetry, serial cardiac enzymes, TSH & TTE.  Strict I's and O's, daily weights, fluid restrict.  40 mg IV Lasix dose given in the ED.  Resume CHF GDMT with beta-blocker and ACE inhibitor as tolerated.  Cardiology consult re further management of CHF.  Attempt gradual O2 wean as tolerated.

## 2025-04-23 NOTE — PROGRESS NOTES
Occupational Therapy                 Therapy Communication Note    Patient Name: Tana Hargrove  MRN: [id number]  Department: POR CR NONV1  Room: 2012/2012-A  Today's Date: 4/23/2025     Discipline: Occupational Therapy    OT Missed Visit: Yes     Missed Visit Reason: Missed Visit Reason: Patient refused (OT eval attempted. pt back from dialysis and transfusion. pt declines therapy at this time 2/2 fatigue and not feeling ready/up to mobilizing)    Missed Time: Attempt    Comment:

## 2025-04-23 NOTE — POST-PROCEDURE NOTE
Report to Receiving RN:    Report To: Florian Hunt RN  Time Report Called: [id number]  Hand-Off Communication:   Pt tolerated tx well. removed 2L post vitals: 146/67 (66) - 69 kg - 96.8*F She had a brown loose BM   Complications During Treatment: No  Ultrafiltration Treatment: No  Medications Administered During Dialysis: No  Blood Products Administered During Dialysis: Yes, 1 unit  Labs Sent During Dialysis: Yes, sent back to RN w/ pt and transport  Heparin Drip Rate Changes: N/A  Dialysis Catheter Dressing: clean, dry, intact  Last Dressing Change: TE 4/23/2025    Electronic Signatures:  Macey Lopez OCDT    Last Updated: 1:14 PM by MACEY LOPEZ

## 2025-04-23 NOTE — H&P
"History Of Present Illness  Tana Hargrove is a 80 y.o. female presenting with \"I couldn't breathe since last night\".    80-year-old female whose past medical history is remarkable for hypertension, hyperlipidemia, DM2, anemia of CKD 5, end-stage renal disease on hemodialysis MWF via right IJ tunneled permacath.  She denies cigarette smoking, alcohol abuse or illicit drug use.  She is usually not on home O2.  She last got dialyzed yesterday morning.  Patient reports mild dyspnea last night when she laid down to sleep without any associated chest pain, palpitations, lightheadedness, near syncope, cough, fever, chills, malaise, nausea, vomiting, diarrhea, headache, skin rash, or neck stiffness.  Shortness of breath became severe today hence her current ED presentation.  Her vitals upon ED arrival included /84, HR 93, temperature 99.5 °F, pulse ox down to upper 80s on room air which improved with supplemental O2 currently at 2 L/min.  Patient admits she forgot to take her blood pressure medicines this morning.  Pertinent ED labs included serum sodium 138, potassium 3.5, bicarb 31, BUN 21, creatinine 2.4, glucose 169, WBC 6.1, hemoglobin 7.5, platelets 200.  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  Patient received the ED 20 mg IV labetalol followed by 5 mg IV hydralazine with improved BP.  She remains on 2 L/min supplemental O2.  Her hospitalization for further management has been sought.    A 10+ point systems review was obtained and is as above, otherwise negative.     Past Medical History  See HPI    Surgical History  See " "HPI     Social History  She reports that she has never smoked. She has never been exposed to tobacco smoke. She has never used smokeless tobacco. She reports that she does not currently use alcohol. She reports that she does not use drugs.    Family History  Reviewed & not pertinent     Allergies  Codeine, Hydrocodone, Hydrocodone-acetaminophen, Tramadol, Piperacillin-tazobactam, Adhesive tape-silicones, and Meperidine    Review of Systems  See HPI     Physical Exam  Constitutional: Pleasant elderly female, in no overt respiratory distress  HEENT: Not pale or jaundiced  Neck: Supple, no JVD  Respiratory: Rales at the lung bases, no wheeze  Cardiovascular: S1-S2 audible regular  GI: Soft nontender abdomen, bowel sounds present  Musculoskeletal: No pedal edema  Skin: No suspicious rash  Psych: Appropriately oriented in all spheres  Neuro: Awake, alert, verbally responsive and follows commands, moves all extremities equally, essentially nonfocal      Last Recorded Vitals  Blood pressure (!) 185/71, pulse 77, temperature 37.5 °C (99.5 °F), temperature source Tympanic, resp. rate (!) 24, height 1.549 m (5' 1\"), weight 70.8 kg (156 lb), SpO2 95%.    Relevant Results  See HPI     Assessment & Plan  Hypoxemia requiring supplemental oxygen  Due to acute CHF per CT findings.  Admit patient to telemetry, serial cardiac enzymes, TSH & TTE.  Strict I's and O's, daily weights, fluid restrict.  40 mg IV Lasix dose given in the ED.  Resume CHF GDMT with beta-blocker and ACE inhibitor as tolerated.  Cardiology consult re further management of CHF.  Attempt gradual O2 wean as tolerated.  Uncontrolled stage 2 hypertension  Due to medication noncompliance.  ED presentation with hypertensive crisis, ? consequent flash pulmonary edema.  Resume her usual antihypertensive regimen and optimize accordingly.  Give parenteral antihypertensive as needed for hypertensive emergency.  I emphasized to the patient the importance of compliance with " prescribed medications .  Acute pulmonary edema  See above  ESRD on hemodialysis (Multi)  Nephrology consult regarding resumption of hemodialysis regimen.  Anemia in chronic kidney disease, on chronic dialysis  Monitor CBC indicis.  Hb currently at baseline, but will transfuse 1 unit PRBC if Hb less than 7.0.        I spent 75 minutes in the professional and overall care of this patient.      South Diaz MD

## 2025-04-23 NOTE — PROGRESS NOTES
Yvonne Hargrove is a 80 y.o. female on day 1 of admission presenting with Hypoxemia requiring supplemental oxygen.      Subjective   Patient seen and examined.  Awake/alert/oriented.  Feels overall improved from yesterday.  Denies shortness of breath or chest pain.  She is having a headache and muscle cramps.  She did have dialysis today.  No nausea, vomiting, or abdominal pain.       Objective     Last Recorded Vitals  /72 (BP Location: Right arm, Patient Position: Lying)   Pulse 65   Temp 37.1 °C (98.7 °F) (Temporal)   Resp 17   Wt 72.2 kg (159 lb 2.8 oz)   SpO2 96%   Intake/Output last 3 Shifts:    Intake/Output Summary (Last 24 hours) at 4/23/2025 1614  Last data filed at 4/23/2025 1243  Gross per 24 hour   Intake 1240 ml   Output 2524 ml   Net -1284 ml       Admission Weight  Weight: 70.8 kg (156 lb) (04/22/25 1520)    Daily Weight  04/23/25 : 72.2 kg (159 lb 2.8 oz)    Image Results  Transthoracic Echo (TTE) [place] [address]       Phone [phone] Fax [phone]    TRANSTHORACIC ECHOCARDIOGRAM REPORT    Patient Name:       YVONNE HARGROVE       Reading Physician:    [id number] Tanmay Alexander MD  Study Date:         4/23/2025            Ordering Provider:    [id number] ROBBIE TEAGUEACHUKWU  MRN/PID:            [id number]             Fellow:  Accession#:         [id number]         Nurse:  Date of Birth/Age:  1945 / 80 years Sonographer:          Sara Mock                                                                 [facility]  Gender Assigned at  F                    Additional Staff:  Birth:  Height:             154.94 cm            Admit Date:           4/22/2025  Weight:             72.12 kg             Admission Status:     Inpatient -                                                                  Routine  BSA / BMI:          1.71 m2 / 30.04      [facility]                      kg/m2  Blood Pressure: 168 /68 mmHg    Study Type:    TRANSTHORACIC ECHO (TTE) COMPLETE  Diagnosis/ICD: Acute respiratory failure with hypoxia-J96.01  Indication:    Congestive Heart Failure  CPT Codes:     Echo Complete w Full Doppler-[id number]    Patient History:  Pertinent History: CHF, CAD, HTN and A-Fib.    Study Detail: The following Echo studies were performed: 2D, M-Mode, Doppler and                color flow.       PHYSICIAN INTERPRETATION:  Left Ventricle: The left ventricular systolic function is normal, with a Torres's biplane calculated ejection fraction of 64%. There is concentric left ventricular hypertrophy. There are no regional wall motion abnormalities. The left ventricular cavity size is normal. There is moderately increased septal and mildly increased posterior left ventricular wall thickness. Spectral Doppler shows a Grade II (pseudonormal pattern) of left ventricular diastolic filling with an elevated left atrial pressure.  Left Atrium: The left atrial size is moderately dilated.  Right Ventricle: The right ventricle is normal in size. There is normal right ventricular global systolic function.  Right Atrium: The right atrial size is normal.  Aortic Valve: The aortic valve is trileaflet. The aortic valve dimensionless index is 0.74. There is no evidence of aortic valve regurgitation. The peak instantaneous gradient of the aortic valve is 10 mmHg. The mean gradient of the aortic valve is 4 mmHg.  Mitral Valve: The mitral valve is mildly thickened. There is mild mitral annular calcification. There is mild mitral valve regurgitation.  Tricuspid Valve: The tricuspid valve is structurally normal. There is mild tricuspid regurgitation. The Doppler estimated RVSP is mildly elevated right ventricular systolic pressure at 42.9 mmHg.  Pulmonic Valve: The pulmonic valve is structurally normal. There  is physiologic pulmonic valve regurgitation.  Pericardium: No pericardial effusion noted.  Aorta: The aortic root is normal.  Systemic Veins: The inferior vena cava appears normal in size, with IVC inspiratory collapse greater than 50%.       CONCLUSIONS:   1. The left ventricular systolic function is normal, with a Torres's biplane calculated ejection fraction of 64%.   2. Spectral Doppler shows a Grade II (pseudonormal pattern) of left ventricular diastolic filling with an elevated left atrial pressure.   3. There is normal right ventricular global systolic function.   4. The left atrial size is moderately dilated.   5. Mildly elevated right ventricular systolic pressure.   6. There is moderately increased septal and mildly increased posterior left ventricular wall thickness.    QUANTITATIVE DATA SUMMARY:     2D MEASUREMENTS:             Normal Ranges:  IVSd:            1.41 cm     (0.6-1.1cm)  LVPWd:           1.23 cm     (0.6-1.1cm)  LVIDd:           4.44 cm     (3.9-5.9cm)  LVIDs:           3.18 cm  LV Mass Index:   130.2 g/m2  LVEDV Index:     56.83 ml/m2  LV % FS          28.5 %       LEFT ATRIUM:                  Normal Ranges:  LA Vol A4C:        59.6 ml    (22+/-6mL/m2)  LA Vol A2C:        64.9 ml  LA Vol BP:         64.0 ml  LA Vol Index A4C:  34.8ml/m2  LA Vol Index A2C:  37.9 ml/m2  LA Vol Index BP:   37.4 ml/m2  LA Area A4C:       19.1 cm2  LA Area A2C:       20.5 cm2  LA Major Axis A4C: 5.2 cm  LA Major Axis A2C: 5.5 cm  LA Volume Index:   37.4 ml/m2  LA Vol A4C:        55.2 ml  LA Vol A2C:        63.0 ml  LA Vol Index BSA:  34.5 ml/m2       RIGHT ATRIUM:         Normal Ranges:  RA Area A4C:  8.6 cm2       AORTA MEASUREMENTS:         Normal Ranges:  Ao Sinus, d:        2.90 cm (2.1-3.5cm)  Ao STJ, d:          2.70 cm (1.7-3.4cm)  Asc Ao, d:          3.20 cm (2.1-3.4cm)       LV SYSTOLIC FUNCTION:                       Normal Ranges:  EF-A4C View:    59 % (>=55%)  EF-A2C View:    69 %  EF-Biplane:      64 %  LV EF Reported: 64 %       LV DIASTOLIC FUNCTION:             Normal Ranges:  MV Peak E:             0.95 m/s    (0.7-1.2 m/s)  MV Peak A:             0.78 m/s    (0.42-0.7 m/s)  E/A Ratio:             1.21        (1.0-2.2)  MV e'                  0.073 m/s   (>8.0)  MV lateral e'          0.09 m/s  MV medial e'           0.06 m/s  MV A Dur:              119.89 msec  E/e' Ratio:            12.99       (<8.0)       MITRAL VALVE:          Normal Ranges:  MV DT:        194 msec (150-240msec)       AORTIC VALVE:                     Normal Ranges:  AoV Vmax:                1.56 m/s (<=1.7m/s)  AoV Peak P.8 mmHg (<20mmHg)  AoV Mean P.4 mmHg (1.7-11.5mmHg)  LVOT Max Mario:            1.09 m/s (<=1.1m/s)  AoV VTI:                 32.91 cm (18-25cm)  LVOT VTI:                24.38 cm  LVOT Diameter:           2.03 cm  (1.8-2.4cm)  AoV Area, VTI:           2.39 cm2 (2.5-5.5cm2)  AoV Area,Vmax:           2.24 cm2 (2.5-4.5cm2)  AoV Dimensionless Index: 0.74       RIGHT VENTRICLE:  RV Basal 3.30 cm  RV Mid   2.20 cm  RV Major 5.9 cm       TRICUSPID VALVE/RVSP:          Normal Ranges:  Peak TR Velocity:     3.16 m/s  RV Syst Pressure:     43 mmHg  (< 30mmHg)  IVC Diam:             1.44 cm       AORTA:  Asc Ao Diam 3.22 cm       [id number] Tanmay Alexander MD  Electronically signed on 2025 at 4:06:08 PM       ** Final **  Point of Care Ultrasound  Rc Marinelli DO     2025  9:40 AM    Performed by: Rc Marinelli DO  Authorized by: Rc Marinelli DO      Respiratory Indications: hypoxia    Procedure: Cardiac Ultrasound    Findings:    The pericardial space was visualized and was NEGATIVE for a significant   pericardial effusion.  Activity: Ventricular contractions were visualized.  LV: LV systolic function was NORMAL. and low-normal range    Impression:  Cardiac: The focused cardiac ultrasound exam had ABNORMAL findings as   specified.    Comments: In the aortic root, hyper echoic  line present that could   potentially be dissection flap versus artifact.  CT angio was ordered for   further evaluation      Physical Exam  Vitals reviewed.   Constitutional:       Appearance: Normal appearance.   HENT:      Head: Normocephalic and atraumatic.   Eyes:      Extraocular Movements: Extraocular movements intact.      Conjunctiva/sclera: Conjunctivae normal.   Cardiovascular:      Rate and Rhythm: Normal rate and regular rhythm.   Pulmonary:      Effort: Pulmonary effort is normal.      Breath sounds: No wheezing, rhonchi or rales.      Comments: Breath sounds clear, diminished bilaterally.  Remains on supplemental oxygen  Abdominal:      General: Bowel sounds are normal.      Palpations: Abdomen is soft.      Tenderness: There is no abdominal tenderness.   Skin:     General: Skin is warm and dry.   Neurological:      General: No focal deficit present.      Mental Status: She is alert and oriented to person, place, and time.         Relevant Results  Lab Results   Component Value Date    GLUCOSE 133 (H) 04/23/2025    CALCIUM 8.4 (L) 04/23/2025     04/23/2025    K 3.3 (L) 04/23/2025    CO2 32 04/23/2025     04/23/2025    BUN 23 04/23/2025    CREATININE 2.58 (H) 04/23/2025     Lab Results   Component Value Date    WBC 3.9 (L) 04/23/2025    HGB 9.1 (L) 04/23/2025    HCT 28.6 (L) 04/23/2025    MCV 95 04/23/2025     04/23/2025    Transthoracic Echo (TTE) Complete  Result Date: 4/23/2025   CONCLUSIONS:  1. The left ventricular systolic function is normal, with a Torres's biplane calculated ejection fraction of 64%.  2. Spectral Doppler shows a Grade II (pseudonormal pattern) of left ventricular diastolic filling with an elevated left atrial pressure.  3. There is normal right ventricular global systolic function.  4. The left atrial size is moderately dilated.  5. Mildly elevated right ventricular systolic pressure.  6. There is moderately increased septal and mildly increased posterior  left ventricular wall thickness. Tanmay Alexander MD Electronically signed on 4/23/2025 at 4:06:08 PM  ** Final **     Point of Care Ultrasound  Result Date: 4/22/2025  Rc Marinelli DO     4/23/2025  9:40 AM Performed by: Rc Marinelli DO Authorized by: Rc Marinelli DO  Respiratory Indications: hypoxia Procedure: Cardiac Ultrasound Findings: The pericardial space was visualized and was NEGATIVE for a significant pericardial effusion. Activity: Ventricular contractions were visualized. LV: LV systolic function was NORMAL. and low-normal range Impression: Cardiac: The focused cardiac ultrasound exam had ABNORMAL findings as specified. Comments: In the aortic root, hyper echoic line present that could potentially be dissection flap versus artifact.  CT angio was ordered for further evaluation    CT angio chest abdomen pelvis  Result Date: 4/22/2025  No evidence of aortic aneurysm, dissection, or acute intramural hematoma.   Pulmonary edema with small right and trace left pleural effusions.   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 Recommend further evaluation with pancreas MRI.   Indeterminate left renal lesion, with suggestion of internal enhancement. Recommend attention during follow-up on MRI.   Mild diffuse bladder wall thickening and adjacent stranding, correlate clinically for cystitis.   Colonic diverticulosis.   Signed by: Brittney Hughes 4/22/2025 6:03 PM Dictation workstation:   [id number]    XR chest 1 view  Result Date: 4/22/2025  No acute cardiopulmonary disease.  There is no significant change when compared to the prior chest x-ray.. Signed by Jose Miguel Marmolejo MD    CT head wo IV contrast  Result Date: 4/22/2025  No acute intracranial abnormality. Consider follow-up with MRI as warranted.     Signed by: Brittney Hughes 4/22/2025 5:50 PM Dictation workstation:   [id number]    ECG 12 Lead  Result Date: 4/1/2025  Sinus  rhythm, PVCs, heart rate 60 bpm.        Assessment & Plan  Hypoxemia requiring supplemental oxygen  Acute hypoxic respiratory failure  Likely secondary to acute on chronic diastolic heart failure, acute pulmonary edema  TTE showed normal EF  CT angio chest reviewed showing pulmonary edema and effusions  Monitor on telemetry  Supplemental oxygen, wean as tolerated  Diuresis    Acute on chronic diastolic heart failure  Resume home cardiac medications  Daily weights  Strict I/Os  BNP  Echocardiogram  Diuresis  Consult cardiology, appreciate recommendations     Uncontrolled stage 2 hypertension  Continue antihypertensives  Monitor blood pressure  Cardiology following, appreciate recommendations  Acute pulmonary edema  See above  ESRD on hemodialysis (Multi)  Nephrology consult regarding resumption of hemodialysis regimen.  Anemia in chronic kidney disease, on chronic dialysis  Hemoglobin decreased to 6.2  Status post 1 unit of PRBCs  Repeat H&H 9.1/28.6, repeat in a.m.  No active bleeding reported  Transfuse for hemoglobin less than 7    Plan  Monitor I's and O's, daily weights  Diuresis per cardiology  Dialysis per nephrology  Wean oxygen as tolerated  Trend H&H, transfusion as needed  DVT prophylaxis  PT/OT  CBC and BMP in the morning             Rajni Ly, APRN-CNP

## 2025-04-23 NOTE — CARE PLAN
Problem: Fall/Injury  Goal: Not fall by end of shift  Outcome: Met  Goal: Be free from injury by end of the shift  Outcome: Met  Goal: Verbalize understanding of personal risk factors for fall in the hospital  Outcome: Met  Goal: Verbalize understanding of risk factor reduction measures to prevent injury from fall in the home  Outcome: Met  Goal: Use assistive devices by end of the shift  Outcome: Met  Goal: Pace activities to prevent fatigue by end of the shift  Outcome: Met   The patient's goals for the shift include  safety and comfort.     The clinical goals for the shift include  pt will remain hemodynamically stable throughout shift.

## 2025-04-23 NOTE — ASSESSMENT & PLAN NOTE
Called pt. She said she is finished with her treatments and made her final appointment just out of the expiration date of her last referral. Ortho office called her today and asked her to have us write a new one. Hemoglobin decreased to 6.2  Status post 1 unit of PRBCs  Repeat H&H 9.1/28.6, repeat in a.m.  No active bleeding reported  Transfuse for hemoglobin less than 7    Plan  Monitor I's and O's, daily weights  Diuresis per cardiology  Dialysis per nephrology  Wean oxygen as tolerated  Trend H&H, transfusion as needed  DVT prophylaxis  PT/OT  CBC and BMP in the morning

## 2025-04-23 NOTE — CONSULTS
[facility]    Patient Name: Tana Hargrove  Patient : 1945  Room/Bed: -A    Date: 25  Time: 8:27 AM    Referred by Dr. De La Rosa ref. provider found for Shortness of Breath and Weakness, Gen     History Of Present Illness:    Tana Hargrove is a 80 y.o. female who presented to the emergency department with shortness of breath and generalized weakness.  Patient states that she noticed increased shortness of breath for proximately 2 days prior to admission.  She stated that on Monday after dialysis she noted she was quite weak and short of breath.  As her symptoms continue to worsen she decided come to the emergency department for evaluation.  BMP shows a serum sodium 139, serum potassium 3.3, serum creatinine of 2.58.  CK was 18.  Serum magnesium was 1.78.  Troponin was 35-32.  TSH was 1.51.  CBC shows a hemoglobin of 6.2. CT scan of the head done 2025 showed no acute intracranial abnormality.  CTA of the chest/abdomen/pelvis showed no evidence of aortic aneurysm, dissection, or acute intramural hematoma, pulmonary edema with small right and trace left pleural effusion, small subcentimeter hypoattenuation lesion in the pancreatic neck, indeterminate left renal lesion, mild diffuse bladder wall thickening, colonic diverticulosis.  During my exam the patient was resting comfortably in bed.    Assessment/Plan:   1.  Acute on chronic HFpEF  Patient presented with increased shortness of breath subsequently found to have BNP elevated at 223 and CTA of the chest showing evidence of pulmonary edema and pleural effusions.  Patient given a dose of IV furosemide although fluid management will be primarily through dialysis sessions.  Echocardiogram done in 2024 showed normal left ventricular systolic function with an ejection fraction of 60%, grade 1 diastolic dysfunction, normal right ventricular systolic function, mild to moderate tricuspid valve regurgitation.   Updated echocardiogram has been ordered.  Continue to monitor urine output and serum electrolytes while here.    2.  CKD/end-stage renal disease  Patient has a history of end-stage renal disease on dialysis.  Management per nephrology.    3.  Paroxysmal atrial fibrillation  Patient has a history of atrial fibrillation not on anticoagulation secondary to MDS/anemia.  Telemetry showing sinus rhythm with heart rate in the 60s.  Can continue carvedilol for heart rate control.  Continue to monitor on telemetry while here.    4.  Hypertension  Blood pressure is suboptimally controlled this morning.  Patient is to be restarted on home antihypertensive medical therapy.  Continue to monitor and adjust antihypertensive medical therapy as necessary.    5.  Dyslipidemia  Continue atorvastatin therapy    6.  Diabetes mellitus  Stable, management per hospitalist service    7.  MDS/anemia  Hemoglobin of 6.2 this morning.  Management per hospitalist service.    Past Medical History:  She has a past medical history of Abnormal levels of other serum enzymes, Acute kidney failure, Anemia, CKD (chronic kidney disease), COPD (chronic obstructive pulmonary disease) (Multi), Coronary artery disease, Disease of blood and blood-forming organs, unspecified, HLD (hyperlipidemia), Hypertension, MDS (myelodysplastic syndrome) (Multi), Personal history of other diseases of the musculoskeletal system and connective tissue, Personal history of other specified conditions, Personal history of other specified conditions, Type 2 diabetes mellitus, and Urinary tract infection, site not specified (10/17/2024).    She has no past medical history of Adverse effect of anesthesia, Arthritis, Asthma, Awareness under anesthesia, CHF (congestive heart failure), Delayed emergence from general anesthesia, Disease of thyroid gland, Hard to intubate, History of transfusion, Malignant hyperthermia, PONV (postoperative nausea and vomiting), Pseudocholinesterase  deficiency, Sickle cell anemia, Spinal headache, or Stroke (Multi).    Past Surgical History:  She has a past surgical history that includes Other surgical history (12/13/2019); Other surgical history (12/13/2019); Other surgical history (Bilateral, 12/13/2019); Other surgical history (Left, 12/13/2019); Other surgical history (12/13/2019); Other surgical history (12/13/2019); Other surgical history (Right, 12/13/2019); Other surgical history (04/16/2021); MR angio head wo IV contrast (11/20/2020); MR angio neck wo IV contrast (11/20/2020); Breast biopsy (Left); Hysterectomy; Appendectomy; Colonoscopy; Oophorectomy; Joint replacement; and Cholecystectomy (06/06/2024).      Social History:  She reports that she has never smoked. She has never been exposed to tobacco smoke. She has never used smokeless tobacco. She reports that she does not currently use alcohol. She reports that she does not use drugs.    Family History:  Family History[1]     Allergies:  Codeine, Hydrocodone, Hydrocodone-acetaminophen, Tramadol, Piperacillin-tazobactam, Adhesive tape-silicones, and Meperidine    Outpatient Medications:  Current Outpatient Medications   Medication Instructions    allopurinol (Zyloprim) 100 mg tablet 1 tablet, Every 12 hours scheduled (0630,1830)    amLODIPine (NORVASC) 10 mg, Daily    atorvastatin (LIPITOR) 10 mg, oral, Daily    carvedilol (COREG) 25 mg, oral, 2 times daily    cholecalciferol (Vitamin D-3) 50 MCG (2000 UT) tablet 1 tablet, Daily    cyanocobalamin (Vitamin B-12) 1,000 mcg tablet 1 tablet, Daily    losartan (COZAAR) 50 mg, oral, Daily    pantoprazole (PROTONIX) 40 mg, oral, Daily before breakfast, Do not crush, chew, or split.    pioglitazone (ACTOS) 15 mg, oral, Daily    pregabalin (LYRICA) 100 mg, oral, 2 times daily        ROS:  A 14 point review of systems was done and is negative other than as stated in HPI    Vitals:  Vitals:    04/22/25 2226 04/22/25 2325 04/23/25 0316 04/23/25 0755   BP: (!)  "198/70 153/67 157/64 171/67   BP Location: Right arm Right arm Left arm Right arm   Patient Position: Lying Lying Lying Lying   Pulse: 83 72 67 71   Resp: 18 18 18 22   Temp: 37.2 °C (98.9 °F) 36.8 °C (98.2 °F) 36.8 °C (98.2 °F) 36.6 °C (97.9 °F)   TempSrc: Temporal Temporal Temporal Temporal   SpO2: 96% 98% 97% 95%   Weight: 71.8 kg (158 lb 4.6 oz)  72.2 kg (159 lb 2.8 oz)    Height: 1.549 m (5' 1\")          Physical Exam:     Constitutional: Cooperative, in no acute distress, alert, appears stated age.  Skin: Skin color, texture, turgor normal. No rashes or lesions.  Head: Normocephalic. No masses, lesions, tenderness or abnormalities  Eyes: Extraocular movements are grossly intact.  Mouth and throat: Mucous membranes moist  Neck: Neck supple, no carotid bruits, no JVD  Respiratory: Lungs clear to auscultation, no wheezing or rhonchi, no use of accessory muscles  Chest wall: No scars, normal excursion with respiration  Cardiovascular: Regular rhythm without murmur  Gastrointestinal: Abdomen soft, nontender. Bowel sounds normal.  Musculoskeletal: Strength equal in upper extremities  Extremities: Trivial pitting edema  Neurologic: Sensation grossly intact, alert and oriented ×3    Intake/Output:   I/O last 2 completed shifts:  In: 240 (3.3 mL/kg) [P.O.:240]  Out: 150 (2.1 mL/kg) [Urine:150 (0.1 mL/kg/hr)]  Weight: 72.2 kg     Outpatient Medications  Medications Ordered Prior to Encounter[2]    Scheduled medications  Scheduled Medications[3]  Continuous medications  Continuous Medications[4]  PRN medications  PRN Medications[5]   Prescriptions Prior to Admission[6]    Recent Labs: (past 2 days)  Recent Results (from the past 48 hours)   CBC and Auto Differential    Collection Time: 04/22/25  3:38 PM   Result Value Ref Range    WBC 6.1 4.4 - 11.3 x10*3/uL    nRBC 0.3 (H) 0.0 - 0.0 /100 WBCs    RBC 2.48 (L) 4.00 - 5.20 x10*6/uL    Hemoglobin 7.5 (L) 12.0 - 16.0 g/dL    Hematocrit 23.5 (L) 36.0 - 46.0 %    MCV 95 80 - " 100 fL    MCH 30.2 26.0 - 34.0 pg    MCHC 31.9 (L) 32.0 - 36.0 g/dL    RDW 18.9 (H) 11.5 - 14.5 %    Platelets 200 150 - 450 x10*3/uL    Neutrophils % 70.9 40.0 - 80.0 %    Immature Granulocytes %, Automated 0.7 0.0 - 0.9 %    Lymphocytes % 17.8 13.0 - 44.0 %    Monocytes % 8.1 2.0 - 10.0 %    Eosinophils % 2.0 0.0 - 6.0 %    Basophils % 0.5 0.0 - 2.0 %    Neutrophils Absolute 4.36 1.60 - 5.50 x10*3/uL    Immature Granulocytes Absolute, Automated 0.04 0.00 - 0.50 x10*3/uL    Lymphocytes Absolute 1.09 0.80 - 3.00 x10*3/uL    Monocytes Absolute 0.50 0.05 - 0.80 x10*3/uL    Eosinophils Absolute 0.12 0.00 - 0.40 x10*3/uL    Basophils Absolute 0.03 0.00 - 0.10 x10*3/uL   Troponin I, High Sensitivity    Collection Time: 04/22/25  3:38 PM   Result Value Ref Range    Troponin I, High Sensitivity 35 (H) 0 - 13 ng/L   Blood Gas Venous Full Panel    Collection Time: 04/22/25  3:38 PM   Result Value Ref Range    POCT pH, Venous 7.46 (H) 7.33 - 7.43 pH    POCT pCO2, Venous 46 41 - 51 mm Hg    POCT pO2, Venous 56 (H) 35 - 45 mm Hg    POCT SO2, Venous 90 (H) 45 - 75 %    POCT Oxy Hemoglobin, Venous 86.9 (H) 45.0 - 75.0 %    POCT Hematocrit Calculated, Venous 23.0 (L) 36.0 - 46.0 %    POCT Sodium, Venous 138 136 - 145 mmol/L    POCT Potassium, Venous 3.3 (L) 3.5 - 5.3 mmol/L    POCT Chloride, Venous 102 98 - 107 mmol/L    POCT Ionized Calicum, Venous 1.23 1.10 - 1.33 mmol/L    POCT Glucose, Venous 166 (H) 74 - 99 mg/dL    POCT Lactate, Venous 0.9 0.4 - 2.0 mmol/L    POCT Base Excess, Venous 8.0 (H) -2.0 - 3.0 mmol/L    POCT HCO3 Calculated, Venous 32.7 (H) 22.0 - 26.0 mmol/L    POCT Hemoglobin, Venous 7.8 (L) 12.0 - 16.0 g/dL    POCT Anion Gap, Venous 7.0 (L) 10.0 - 25.0 mmol/L    Patient Temperature 37.0 degrees Celsius    FiO2 21 %   Basic metabolic panel    Collection Time: 04/22/25  3:38 PM   Result Value Ref Range    Glucose 169 (H) 74 - 99 mg/dL    Sodium 138 136 - 145 mmol/L    Potassium 3.5 3.5 - 5.3 mmol/L    Chloride 101  98 - 107 mmol/L    Bicarbonate 31 21 - 32 mmol/L    Anion Gap 10 10 - 20 mmol/L    Urea Nitrogen 21 6 - 23 mg/dL    Creatinine 2.40 (H) 0.50 - 1.05 mg/dL    eGFR 20 (L) >60 mL/min/1.73m*2    Calcium 8.9 8.6 - 10.3 mg/dL   Hepatic function panel    Collection Time: 04/22/25  3:38 PM   Result Value Ref Range    Albumin 3.7 3.4 - 5.0 g/dL    Bilirubin, Total 1.0 0.0 - 1.2 mg/dL    Bilirubin, Direct 0.1 0.0 - 0.3 mg/dL    Alkaline Phosphatase 70 33 - 136 U/L    ALT 11 7 - 45 U/L    AST 14 9 - 39 U/L    Total Protein 6.4 6.4 - 8.2 g/dL   Type And Screen    Collection Time: 04/22/25  3:38 PM   Result Value Ref Range    ABO TYPE A     Rh TYPE POS     ANTIBODY SCREEN NEG    Sars-CoV-2 and Influenza A/B PCR    Collection Time: 04/22/25  3:40 PM   Result Value Ref Range    Flu A Result Not Detected Not Detected    Flu B Result Not Detected Not Detected    Coronavirus 2019, PCR Not Detected Not Detected   POCT GLUCOSE    Collection Time: 04/22/25 10:05 PM   Result Value Ref Range    POCT Glucose 148 (H) 74 - 99 mg/dL   CBC and Auto Differential    Collection Time: 04/23/25  3:25 AM   Result Value Ref Range    WBC 3.9 (L) 4.4 - 11.3 x10*3/uL    nRBC 0.0 0.0 - 0.0 /100 WBCs    RBC 2.07 (L) 4.00 - 5.20 x10*6/uL    Hemoglobin 6.2 (LL) 12.0 - 16.0 g/dL    Hematocrit 19.7 (L) 36.0 - 46.0 %    MCV 95 80 - 100 fL    MCH 30.0 26.0 - 34.0 pg    MCHC 31.5 (L) 32.0 - 36.0 g/dL    RDW 19.3 (H) 11.5 - 14.5 %    Platelets 177 150 - 450 x10*3/uL    Neutrophils % 60.7 40.0 - 80.0 %    Immature Granulocytes %, Automated 0.3 0.0 - 0.9 %    Lymphocytes % 27.3 13.0 - 44.0 %    Monocytes % 8.9 2.0 - 10.0 %    Eosinophils % 2.3 0.0 - 6.0 %    Basophils % 0.5 0.0 - 2.0 %    Neutrophils Absolute 2.38 1.60 - 5.50 x10*3/uL    Immature Granulocytes Absolute, Automated 0.01 0.00 - 0.50 x10*3/uL    Lymphocytes Absolute 1.07 0.80 - 3.00 x10*3/uL    Monocytes Absolute 0.35 0.05 - 0.80 x10*3/uL    Eosinophils Absolute 0.09 0.00 - 0.40 x10*3/uL    Basophils  Absolute 0.02 0.00 - 0.10 x10*3/uL   Comprehensive Metabolic Panel    Collection Time: 04/23/25  3:25 AM   Result Value Ref Range    Glucose 133 (H) 74 - 99 mg/dL    Sodium 139 136 - 145 mmol/L    Potassium 3.3 (L) 3.5 - 5.3 mmol/L    Chloride 101 98 - 107 mmol/L    Bicarbonate 32 21 - 32 mmol/L    Anion Gap 9 (L) 10 - 20 mmol/L    Urea Nitrogen 23 6 - 23 mg/dL    Creatinine 2.58 (H) 0.50 - 1.05 mg/dL    eGFR 18 (L) >60 mL/min/1.73m*2    Calcium 8.4 (L) 8.6 - 10.3 mg/dL    Albumin 3.2 (L) 3.4 - 5.0 g/dL    Alkaline Phosphatase 61 33 - 136 U/L    Total Protein 5.5 (L) 6.4 - 8.2 g/dL    AST 12 9 - 39 U/L    Bilirubin, Total 1.0 0.0 - 1.2 mg/dL    ALT 11 7 - 45 U/L   Phosphorus    Collection Time: 04/23/25  3:25 AM   Result Value Ref Range    Phosphorus 4.8 2.5 - 4.9 mg/dL   Magnesium    Collection Time: 04/23/25  3:25 AM   Result Value Ref Range    Magnesium 1.78 1.60 - 2.40 mg/dL   TSH with reflex to Free T4 if abnormal    Collection Time: 04/23/25  3:25 AM   Result Value Ref Range    Thyroid Stimulating Hormone 1.51 0.44 - 3.98 mIU/L   Troponin I, High Sensitivity    Collection Time: 04/23/25  3:25 AM   Result Value Ref Range    Troponin I, High Sensitivity 32 (H) 0 - 13 ng/L   Creatine Kinase    Collection Time: 04/23/25  3:25 AM   Result Value Ref Range    Creatine Kinase 18 0 - 215 U/L   Prepare RBC: 1 Units    Collection Time: 04/23/25  5:43 AM   Result Value Ref Range    PRODUCT CODE [id number]     Unit Number [id number]     Unit ABO A     Unit RH NEG     XM INTEP COMP     Dispense Status XM     Blood Expiration Date 5/6/2025 11:59:00 PM EDT     PRODUCT BLOOD TYPE 0600     UNIT VOLUME 282        CV Studies:  EKG:  Encounter Date: 04/01/25   ECG 12 Lead    Narrative    Sinus rhythm, PVCs, heart rate 60 bpm.     Echocardiogram:   Echocardiogram     [place] [id number]  Phone [phone] Fax [phone]    TRANSTHORACIC ECHOCARDIOGRAM  REPORT      Patient Name:     YVONNE BAEZ      Reading Physician:   [id number] Humphrey Callaway DO  Study Date:       12/14/2022          Referring Physician: JASMIN ROACH  MRN/PID:          [id number]            PCP:  Accession/Order#: [id number]           Department Location: [facility]  YOB: 1945           Fellow:  Gender:           F                   Nurse:  Admit Date:       12/14/2022          Sonographer:         Urvashi Arora RD  Admission Status: Inpatient - Routine Additional Staff:  Height:           152.40 cm           CC Report to:  Weight:           75.30 kg            Study Type:          Echocardiogram  BSA:              1.72 m2  Blood Pressure: 159 /63 mmHg    Diagnosis/ICD: R06.00-Dyspnea, unspecified  Indication:    Dyspnea  Procedure/CPT: Echo Complete w Full [id number]    Patient History:  Pertinent History: Dyspnea.    Study Detail: The following Echo studies were performed: 2D, M-Mode, Doppler and  color flow.      PHYSICIAN INTERPRETATION:  Left Ventricle: Left ventricular systolic function is low normal, with an estimated ejection fraction of 50-55%. There are no regional wall motion abnormalities. The left ventricular cavity size is normal. The left ventricular septal wall thickness is mildly increased. Left ventricular diastolic filling was indeterminate.  Left Atrium: The left atrium is moderate to severely dilated.  Right Ventricle: The right ventricle is normal in size. There is low normal right ventricular systolic function.  Right Atrium: The right atrium is mildly dilated.  Aortic Valve: The aortic valve is trileaflet. There is no evidence of aortic valve regurgitation.  Mitral Valve: The mitral valve is normal in structure. There is mild mitral valve regurgitation.  Tricuspid Valve: The tricuspid valve is structurally normal. There is mild to moderate tricuspid regurgitation. The Doppler estimated RVSP is moderately elevated at 52.4 mmHg.  Pulmonic Valve: The  pulmonic valve is structurally normal. There is trace pulmonic valve regurgitation.  Pericardium: There is no pericardial effusion noted.  Aorta: The aortic root is normal.      CONCLUSIONS:  1. Left ventricular systolic function is low normal with a 50-55% estimated ejection fraction.  2. There is low normal right ventricular systolic function.  3. The left atrium is moderate to severely dilated.  4. Mild to moderate tricuspid regurgitation.  5. Moderately elevated right ventricular systolic pressure.    QUANTITATIVE DATA SUMMARY:  2D MEASUREMENTS:  Normal Ranges:  IVSd:          1.29 cm    (0.6-1.1cm)  LVPWd:         1.04 cm    (0.6-1.1cm)  LVIDd:         5.17 cm    (3.9-5.9cm)  LVIDs:         3.93 cm  LV Mass Index: 136.8 g/m2  LV % FS        24.0 %    LA VOLUME:  Normal Ranges:  LA Vol A4C:        81.6 ml    (22+/-6mL/m2)  LA Vol A2C:        81.6 ml  LA Vol BP:         81.6 ml  LA Vol Index A4C:  47.3ml/m2  LA Vol Index A2C:  47.3 ml/m2  LA Vol Index BP:   47.3 ml/m2  LA Area A4C:       24.0 cm2  LA Area A2C:       24.0 cm2  LA Major Axis A4C: 6.0 cm  LA Major Axis A2C: 6.0 cm  LA Volume Index:   47.0 ml/m2  LA Vol A4C:        80.3 ml  LA Vol A2C:        71.0 ml    RA VOLUME BY A/L METHOD:  Normal Ranges:  RA Area A4C: 15.0 cm2    AORTA MEASUREMENTS:  Normal Ranges:  AoV Silva,s: 2.90 cm (1.4-2.6cm)  Asc Ao, d: 3.30 cm (2.1-3.4cm)    LV SYSTOLIC FUNCTION BY 2D PLANIMETRY (MOD):  Normal Ranges:  EF-A4C View: 46.2 % (>=55%)  EF-A2C View: 55.6 %  EF-Biplane:  50.6 %    LV DIASTOLIC FUNCTION:  Normal Ranges:  MV Peak E:    1.05 m/s    (0.7-1.2 m/s)  MV Peak A:    0.91 m/s    (0.42-0.7 m/s)  E/A Ratio:    1.14        (1.0-2.2)  MV e'         0.08 m/s    (>8.0)  MV lateral e' 0.10 m/s  MV medial e'  0.07 m/s  MV A Dur:     108.47 msec  E/e' Ratio:   12.32       (<8.0)  LV IVRT:      74 msec     (<100ms)    MITRAL VALVE:  Normal Ranges:  MV DT: 153 msec (150-240msec)    AORTIC VALVE:  Normal Ranges:  LVOT Max Mario:  1.07  m/s (<=1.1m/s)  LVOT VTI:      25.50 cm  LVOT Diameter: 1.73 cm  (1.8-2.4cm)    RIGHT VENTRICLE:  RV 1   2.8 cm  RV 2   2.3 cm  RV 3   6.4 cm  TAPSE: 26.5 mm  RV s'  0.12 m/s    TRICUSPID VALVE/RVSP:  Normal Ranges:  Peak TR Velocity: 3.51 m/s  RV Syst Pressure: 52.4 mmHg (< 30mmHg)  TV E Vmax:        0.42 m/s  (0.3-0.7m/s)  TV A Vmax:        0.56 m/s  IVC Diam:         1.33 cm  TV e'             0.1 m/s    AORTA:  Asc Ao Diam 3.27 cm      [id number] Humphrey Callaway DO  Electronically signed on 12/14/2022 at 4:35:23 PM         Final     Stress Testing IMGRESULT(URB7064:1:1825):   NM CARDIAC STRESS REST (MYOCARDIAL PERFUSION MIBI) 04/08/2022    Narrative  MRN: [id number]  Patient Name: YVONNE BAZE    STUDY:  CARDIAC STRESS/REST INJECTION; PART 2 STRESS OR REST (NO CHARGE);  CARDIAC STRESS/REST (MYOCARDIAL PERFUSION/MIBI);  4/8/2022 12:00 pm    INDICATION:  CP .    COMPARISON:  None.    ACCESSION NUMBER(S):  [id number]; [id number]; [id number]    ORDERING CLINICIAN:  HUMPHREY CALLAWAY    TECHNIQUE:  DIVISION OF NUCLEAR MEDICINE  PHARMACOLOGIC STRESS MYOCARDIAL PERFUSION SCAN, ONE DAY PROTOCOL    The patient received an intravenous dose of  11.6 mCi of Tc-99m  tetrofosmin and resting emission tomographic (SPECT) images of the  myocardium were acquired. The patient then received an intravenous  infusion of 0.4mg regadenoson (Lexiscan) followed by an additional  dose of  33.7 mCi of Tc-99m tetrofosmin. Stress phase SPECT images of  the myocardium were then acquired. These included ECG-gated images to  assess and quantify ventricular function.    FINDINGS:  There is a small fixed perfusion defect in the mid  anterior/anteroseptal wall with associated wall motion abnormality  consistent with prior infarct. No reversible perfusion defects seen.    Calculated ejection fraction 50% mild hypokinesis of the mid anterior  wall.    Impression  1. There is a small fixed perfusion defect in the mid  anterior/anteroseptal wall with associated wall motion  abnormality  consistent with prior infarct. There is a low probability of ischemia.    2. Calculated ejection fraction 50% mild hypokinesis of the mid  anterior wall.    Cardiac Catheterization: No results found for this or any previous visit from the past 1825 days.  No results found for this or any previous visit from the past 3650 days.     Cardiac Scoring: No results found for this or any previous visit from the past 1825 days.    AAA : No results found for this or any previous visit from the past 1825 days.    OTHER: No results found for this or any previous visit from the past 1825 days.    LAST IMAGING RESULTS  CT angio chest abdomen pelvis  Narrative: Interpreted By:  Brittney Hughes,   STUDY:  CT ANGIO CHEST ABDOMEN PELVIS;  4/22/2025 5:33 pm      INDICATION:  Signs/Symptoms:hypertensive, new hypoxia. history HD..      COMPARISON:  None.      ACCESSION NUMBER(S):  [id number]      ORDERING CLINICIAN:  JIMMY MILIAN      TECHNIQUE:  CT of the chest, abdomen, and pelvis was performed with and without  contrast. Contiguous axial images were obtained at 3 mm slice  thickness through the chest, abdomen and pelvis. Coronal and sagittal  reconstructions at 3 mm slice thickness were performed.  100 ml of contrast Omnipaque 350 were administered intravenously  without immediate complication. MIP reconstructions. 3D  reconstructions.      FINDINGS:  Lungs and Pleura: Smooth interlobular septal thickening bilaterally  with areas of ground-glass opacity, right-greater-than-left,  compatible with pulmonary edema. Multiple right upper lobe pulmonary  nodules measuring between 3 mm and 9 mm. Few 3 mm left upper lobe  pulmonary nodules. Small right and trace left pleural effusions.      Mediastinum: No adenopathy by CT size criteria. No cardiomegaly or  pericardial effusion. Right internal jugular central venous catheter  tip at the cavoatrial junction. Coronary artery calcifications.      Liver: The liver is  unremarkable without focal lesion.      Gallbladder and Biliary: Status post cholecystectomy.      Pancreas: There is a 6 mm hypoattenuating focus in the pancreatic  neck.      Spleen: Mild splenomegaly, 13.6 cm craniocaudally.      Adrenals: No abnormality identified in either adrenal gland.      Urinary: Exophytic hypoattenuating lesion in the left kidney midzone  measuring 10 mm, attenuation increases from 20 Hounsfield units to 40  Hounsfield units on the postcontrast imaging, recommend further  assessment with MRI renal protocol. Right renal cyst measuring 11 mm.  No hydronephrosis. Mild diffuse bladder wall thickening. Adjacent  stranding.      Reproductive: Status post hysterectomy.      Gastrointestinal/Peritoneum: No small or large bowel obstruction in  the visualized abdomen. In the abdomen, there is no extraluminal air.  No significant free fluid. Sigmoid colonic diverticulosis. Splenic  diverticulum. Appendix not seen. No secondary signs of appendicitis  in the pericecal region.      Vascular: No evidence of aortic aneurysm, dissection, or acute  intramural hematoma. Mild atherosclerosis.      Lymphatics: No enlarged lymph nodes by size criteria.      MSK/Body Wall/Chest Wall: No aggressive bony lesion identified.  Multiple chronic bilateral rib fractures. Partially visualized right  femoral IM nail. Posterior spinal fusion hardware L2-L5. Interbody  spacer L5-S1. Multilevel degenerative change in the spine.      Impression: No evidence of aortic aneurysm, dissection, or acute intramural  hematoma.      Pulmonary edema with small right and trace left pleural effusions.      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 Recommend further  evaluation with pancreas MRI.      Indeterminate left renal lesion, with suggestion of internal  enhancement. Recommend attention during follow-up on MRI.      Mild diffuse bladder wall thickening and  adjacent stranding,  correlate clinically for cystitis.      Colonic diverticulosis.      Signed by: Brittney Hughes 4/22/2025 6:03 PM  Dictation workstation:   [id number]  XR chest 1 view  Narrative: STUDY:  Chest Radiograph;  4/22/2025 4:03PM  INDICATION:  Weakness.  COMPARISON:  5/19/2024 XR Chest.  ACCESSION NUMBER(S):  [id number]  ORDERING CLINICIAN:  MEMO MCGRATH  TECHNIQUE:  Frontal chest was obtained at 16:01 hours.  FINDINGS:  CARDIOMEDIASTINAL SILHOUETTE:  Cardiomediastinal silhouette is normal in size and configuration.     LUNGS:  The right ovary diaphragm remains mildly elevated but the lungs  otherwise are clear with no edema or consolidation.  A central  catheter remains on the right extending to the right atrium level.   There is no visible pleural effusion or pneumothorax..     ABDOMEN:  No remarkable upper abdominal findings.     BONES:  No acute osseous changes.  Impression: No acute cardiopulmonary disease.  There is no significant change when  compared to the prior chest x-ray..  Signed by Jose Miguel Marmolejo MD  CT head wo IV contrast  Narrative: Interpreted By:  Brittney Hughes,   STUDY:  CT HEAD WO IV CONTRAST;  4/22/2025 5:33 pm      INDICATION:  Signs/Symptoms:fall.      COMPARISON:  None.      ACCESSION NUMBER(S):  [id number]      ORDERING CLINICIAN:  MEMO [id number]      TECHNIQUE:  Noncontrast axial CT scan of head was performed. Multiplanar  reconstructions      FINDINGS:  No acute intracranial hemorrhage, mass effect, midline shift, or  herniation. No evidence of hydrocephalus. The ventricles and sulci  are unremarkable for age. The visualized paranasal sinuses and  mastoid air cells are clear. No acute osseous abnormality of the  calvarium. No destructive bone lesion.      Impression: No acute intracranial abnormality. Consider follow-up with MRI as  warranted.          Signed by: Brittney Hughes 4/22/2025 5:50 PM  Dictation workstation:   [id number]        Humphrey Callaway DO, [facility],  FACOI  4/23/2025  8:27 AM         [1]   Family History  Problem Relation Name Age of Onset    Heart disease Mother      Heart attack Father      Hodgkin's lymphoma Son     [2]   No current facility-administered medications on file prior to encounter.     Current Outpatient Medications on File Prior to Encounter   Medication Sig Dispense Refill    allopurinol (Zyloprim) 100 mg tablet Take 1 tablet (100 mg) by mouth every 12 hours.      amLODIPine (Norvasc) 10 mg tablet Take 1 tablet (10 mg) by mouth once daily.      atorvastatin (Lipitor) 10 mg tablet Take 1 tablet (10 mg) by mouth once daily. 90 tablet 1    carvedilol (Coreg) 25 mg tablet Take 1 tablet (25 mg) by mouth 2 times a day. 180 tablet 1    cholecalciferol (Vitamin D-3) 50 MCG (2000 UT) tablet Take 1 tablet (50 mcg) by mouth once daily.      cyanocobalamin (Vitamin B-12) 1,000 mcg tablet Take 1 tablet (1,000 mcg) by mouth once daily.      losartan (Cozaar) 50 mg tablet Take 1 tablet (50 mg) by mouth once daily. 90 tablet 1    pantoprazole (ProtoNix) 40 mg EC tablet Take 1 tablet (40 mg) by mouth once daily in the morning. Take before meals. Do not crush, chew, or split. 30 tablet 2    pioglitazone (Actos) 15 mg tablet Take 1 tablet (15 mg) by mouth once daily. 90 tablet 1    pregabalin (Lyrica) 100 mg capsule Take 1 capsule (100 mg) by mouth 2 times a day. 60 capsule 0   [3] amLODIPine, 10 mg, oral, Daily  atorvastatin, 10 mg, oral, Nightly  carvedilol, 25 mg, oral, BID  cholecalciferol, 50 mcg, oral, Daily  cyanocobalamin, 1,000 mcg, oral, Daily  furosemide, 40 mg, intravenous, Daily  heparin, 2,000 Units, intra-catheter, After Dialysis  insulin lispro, 0-10 Units, subcutaneous, TID AC  losartan, 50 mg, oral, Daily  pantoprazole, 40 mg, oral, Daily  polyethylene glycol, 17 g, oral, Daily  pregabalin, 75 mg, oral, Daily    [4]    [5] PRN medications: acetaminophen, bisacodyl, dextrose, dextrose, glucagon, glucagon, guaiFENesin, hydrALAZINE, melatonin,  ondansetron ODT **OR** ondansetron  [6]   Medications Prior to Admission   Medication Sig Dispense Refill Last Dose/Taking    allopurinol (Zyloprim) 100 mg tablet Take 1 tablet (100 mg) by mouth every 12 hours.       amLODIPine (Norvasc) 10 mg tablet Take 1 tablet (10 mg) by mouth once daily.       atorvastatin (Lipitor) 10 mg tablet Take 1 tablet (10 mg) by mouth once daily. 90 tablet 1     carvedilol (Coreg) 25 mg tablet Take 1 tablet (25 mg) by mouth 2 times a day. 180 tablet 1     cholecalciferol (Vitamin D-3) 50 MCG (2000 UT) tablet Take 1 tablet (50 mcg) by mouth once daily.       cyanocobalamin (Vitamin B-12) 1,000 mcg tablet Take 1 tablet (1,000 mcg) by mouth once daily.       losartan (Cozaar) 50 mg tablet Take 1 tablet (50 mg) by mouth once daily. 90 tablet 1     pantoprazole (ProtoNix) 40 mg EC tablet Take 1 tablet (40 mg) by mouth once daily in the morning. Take before meals. Do not crush, chew, or split. 30 tablet 2     pioglitazone (Actos) 15 mg tablet Take 1 tablet (15 mg) by mouth once daily. 90 tablet 1     pregabalin (Lyrica) 100 mg capsule Take 1 capsule (100 mg) by mouth 2 times a day. 60 capsule 0

## 2025-04-24 ENCOUNTER — APPOINTMENT (OUTPATIENT)
Dept: RADIOLOGY | Facility: HOSPITAL | Age: 80
DRG: 291 | End: 2025-04-24
Payer: MEDICARE

## 2025-04-24 LAB
ANION GAP SERPL CALC-SCNC: 11 MMOL/L (ref 10–20)
BLOOD EXPIRATION DATE: NORMAL
BUN SERPL-MCNC: 16 MG/DL (ref 6–23)
CALCIUM SERPL-MCNC: 8.7 MG/DL (ref 8.6–10.3)
CHLORIDE SERPL-SCNC: 100 MMOL/L (ref 98–107)
CO2 SERPL-SCNC: 30 MMOL/L (ref 21–32)
CREAT SERPL-MCNC: 2.27 MG/DL (ref 0.5–1.05)
DISPENSE STATUS: NORMAL
EGFRCR SERPLBLD CKD-EPI 2021: 21 ML/MIN/1.73M*2
ERYTHROCYTE [DISTWIDTH] IN BLOOD BY AUTOMATED COUNT: 20.3 % (ref 11.5–14.5)
GLUCOSE BLD MANUAL STRIP-MCNC: 115 MG/DL (ref 74–99)
GLUCOSE BLD MANUAL STRIP-MCNC: 120 MG/DL (ref 74–99)
GLUCOSE BLD MANUAL STRIP-MCNC: 163 MG/DL (ref 74–99)
GLUCOSE SERPL-MCNC: 105 MG/DL (ref 74–99)
HCT VFR BLD AUTO: 24.3 % (ref 36–46)
HGB BLD-MCNC: 7.8 G/DL (ref 12–16)
HOLD SPECIMEN: NORMAL
MCH RBC QN AUTO: 30.2 PG (ref 26–34)
MCHC RBC AUTO-ENTMCNC: 32.1 G/DL (ref 32–36)
MCV RBC AUTO: 94 FL (ref 80–100)
NRBC BLD-RTO: 0 /100 WBCS (ref 0–0)
PLATELET # BLD AUTO: 175 X10*3/UL (ref 150–450)
POTASSIUM SERPL-SCNC: 4.1 MMOL/L (ref 3.5–5.3)
PRODUCT BLOOD TYPE: 600
PRODUCT CODE: NORMAL
RBC # BLD AUTO: 2.58 X10*6/UL (ref 4–5.2)
SODIUM SERPL-SCNC: 137 MMOL/L (ref 136–145)
UNIT ABO: NORMAL
UNIT NUMBER: NORMAL
UNIT RH: NORMAL
UNIT VOLUME: 282
WBC # BLD AUTO: 3.9 X10*3/UL (ref 4.4–11.3)
XM INTEP: NORMAL

## 2025-04-24 PROCEDURE — 82947 ASSAY GLUCOSE BLOOD QUANT: CPT

## 2025-04-24 PROCEDURE — 2550000001 HC RX 255 CONTRASTS: Performed by: INTERNAL MEDICINE

## 2025-04-24 PROCEDURE — 85027 COMPLETE CBC AUTOMATED: CPT | Performed by: NURSE PRACTITIONER

## 2025-04-24 PROCEDURE — A9575 INJ GADOTERATE MEGLUMI 0.1ML: HCPCS | Performed by: INTERNAL MEDICINE

## 2025-04-24 PROCEDURE — 2060000001 HC INTERMEDIATE ICU ROOM DAILY

## 2025-04-24 PROCEDURE — 99233 SBSQ HOSP IP/OBS HIGH 50: CPT | Performed by: INTERNAL MEDICINE

## 2025-04-24 PROCEDURE — 5A1D70Z PERFORMANCE OF URINARY FILTRATION, INTERMITTENT, LESS THAN 6 HOURS PER DAY: ICD-10-PCS | Performed by: INTERNAL MEDICINE

## 2025-04-24 PROCEDURE — 97165 OT EVAL LOW COMPLEX 30 MIN: CPT | Mod: GO

## 2025-04-24 PROCEDURE — 2500000004 HC RX 250 GENERAL PHARMACY W/ HCPCS (ALT 636 FOR OP/ED): Mod: JZ | Performed by: INTERNAL MEDICINE

## 2025-04-24 PROCEDURE — 74183 MRI ABD W/O CNTR FLWD CNTR: CPT | Performed by: RADIOLOGY

## 2025-04-24 PROCEDURE — 80048 BASIC METABOLIC PNL TOTAL CA: CPT | Performed by: NURSE PRACTITIONER

## 2025-04-24 PROCEDURE — 2500000001 HC RX 250 WO HCPCS SELF ADMINISTERED DRUGS (ALT 637 FOR MEDICARE OP): Performed by: REGISTERED NURSE

## 2025-04-24 PROCEDURE — 2500000001 HC RX 250 WO HCPCS SELF ADMINISTERED DRUGS (ALT 637 FOR MEDICARE OP): Performed by: INTERNAL MEDICINE

## 2025-04-24 PROCEDURE — 99231 SBSQ HOSP IP/OBS SF/LOW 25: CPT | Performed by: NURSE PRACTITIONER

## 2025-04-24 PROCEDURE — 36415 COLL VENOUS BLD VENIPUNCTURE: CPT | Performed by: NURSE PRACTITIONER

## 2025-04-24 PROCEDURE — 76376 3D RENDER W/INTRP POSTPROCES: CPT | Performed by: RADIOLOGY

## 2025-04-24 PROCEDURE — 97161 PT EVAL LOW COMPLEX 20 MIN: CPT | Mod: GP | Performed by: PHYSICAL THERAPIST

## 2025-04-24 PROCEDURE — 74183 MRI ABD W/O CNTR FLWD CNTR: CPT

## 2025-04-24 RX ORDER — GADOTERATE MEGLUMINE 376.9 MG/ML
0.2 INJECTION INTRAVENOUS
Status: COMPLETED | OUTPATIENT
Start: 2025-04-24 | End: 2025-04-24

## 2025-04-24 RX ADMIN — AMLODIPINE BESYLATE 10 MG: 10 TABLET ORAL at 10:11

## 2025-04-24 RX ADMIN — FUROSEMIDE 40 MG: 10 INJECTION, SOLUTION INTRAMUSCULAR; INTRAVENOUS at 10:09

## 2025-04-24 RX ADMIN — PREGABALIN 75 MG: 75 CAPSULE ORAL at 10:11

## 2025-04-24 RX ADMIN — CARVEDILOL 25 MG: 25 TABLET, FILM COATED ORAL at 20:13

## 2025-04-24 RX ADMIN — ACETAMINOPHEN 650 MG: 325 TABLET ORAL at 11:46

## 2025-04-24 RX ADMIN — ATORVASTATIN CALCIUM 10 MG: 10 TABLET, FILM COATED ORAL at 20:13

## 2025-04-24 RX ADMIN — LOSARTAN POTASSIUM 50 MG: 50 TABLET, FILM COATED ORAL at 10:10

## 2025-04-24 RX ADMIN — CARVEDILOL 25 MG: 25 TABLET, FILM COATED ORAL at 10:11

## 2025-04-24 RX ADMIN — PANTOPRAZOLE SODIUM 40 MG: 40 TABLET, DELAYED RELEASE ORAL at 10:12

## 2025-04-24 RX ADMIN — Medication 1000 MCG: at 10:10

## 2025-04-24 RX ADMIN — GADOTERATE MEGLUMINE 14 ML: 376.9 INJECTION INTRAVENOUS at 09:06

## 2025-04-24 RX ADMIN — Medication 50 MCG: at 10:12

## 2025-04-24 ASSESSMENT — PAIN - FUNCTIONAL ASSESSMENT
PAIN_FUNCTIONAL_ASSESSMENT: UNABLE TO SELF-REPORT
PAIN_FUNCTIONAL_ASSESSMENT: 0-10
PAIN_FUNCTIONAL_ASSESSMENT: 0-10

## 2025-04-24 ASSESSMENT — COGNITIVE AND FUNCTIONAL STATUS - GENERAL
TURNING FROM BACK TO SIDE WHILE IN FLAT BAD: A LITTLE
HELP NEEDED FOR BATHING: A LITTLE
DRESSING REGULAR LOWER BODY CLOTHING: A LOT
DAILY ACTIVITIY SCORE: 16
TOILETING: A LITTLE
DRESSING REGULAR UPPER BODY CLOTHING: A LITTLE
CLIMB 3 TO 5 STEPS WITH RAILING: A LOT
DRESSING REGULAR UPPER BODY CLOTHING: A LITTLE
EATING MEALS: A LITTLE
WALKING IN HOSPITAL ROOM: A LITTLE
CLIMB 3 TO 5 STEPS WITH RAILING: TOTAL
DAILY ACTIVITIY SCORE: 17
HELP NEEDED FOR BATHING: A LOT
TOILETING: A LOT
PERSONAL GROOMING: A LITTLE
WALKING IN HOSPITAL ROOM: A LOT
TURNING FROM BACK TO SIDE WHILE IN FLAT BAD: A LITTLE
STANDING UP FROM CHAIR USING ARMS: A LITTLE
MOBILITY SCORE: 17
PERSONAL GROOMING: A LITTLE
MOBILITY SCORE: 17
MOVING TO AND FROM BED TO CHAIR: A LITTLE
DRESSING REGULAR LOWER BODY CLOTHING: A LOT
STANDING UP FROM CHAIR USING ARMS: A LITTLE
MOVING TO AND FROM BED TO CHAIR: A LITTLE

## 2025-04-24 ASSESSMENT — PAIN SCALES - GENERAL
PAINLEVEL_OUTOF10: 0 - NO PAIN
PAINLEVEL_OUTOF10: 0 - NO PAIN

## 2025-04-24 ASSESSMENT — ACTIVITIES OF DAILY LIVING (ADL)
ADL_ASSISTANCE: INDEPENDENT
BATHING_ASSISTANCE: MAXIMAL

## 2025-04-24 NOTE — CARE PLAN
The patient's goals for the shift include      The clinical goals for the shift include Patient will remain hemodynamically stable throught shift    Over the shift, the patient did not make progress toward the following goals. Barriers to progression include dilaysis patient. Recommendations to address these barriers include maintain dialysis schedule .

## 2025-04-24 NOTE — PROGRESS NOTES
Tana Hargrove is a 80 y.o. female on day 2 of admission presenting with Hypoxemia requiring supplemental oxygen.      Subjective   80-year-old female whose past medical history is remarkable for hypertension, hyperlipidemia, DM2, anemia of CKD 5, end-stage renal disease on hemodialysis MWF via right IJ tunneled permacath.  She denies cigarette smoking, alcohol abuse or illicit drug use.  She is usually not on home O2.  She last got dialyzed yesterday morning.  Patient reports mild dyspnea last night when she laid down to sleep without any associated chest pain, palpitations, lightheadedness, near syncope, cough, fever, chills, malaise, nausea, vomiting, diarrhea, headache, skin rash, or neck stiffness.  Shortness of breath became severe today hence her current ED presentation.  Her vitals upon ED arrival included /84, HR 93, temperature 99.5 °F, pulse ox down to upper 80s on room air which improved with supplemental O2 currently at 2 L/min.  Patient admits she forgot to take her blood pressure medicines this morning.  Pertinent ED labs included serum sodium 138, potassium 3.5, bicarb 31, BUN 21, creatinine 2.4, glucose 169, WBC 6.1, hemoglobin 7.5, platelets 200.  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  Patient received the ED 20 mg IV labetalol followed by 5 mg IV hydralazine with improved BP.  She remains on 2 L/min supplemental O2.  Her hospitalization for further management has been sought.   4/23:Patient seen and examined.  Awake/alert/oriented.  Feels overall improved from yesterday.  Denies shortness of breath or  chest pain.  She is having a headache and muscle cramps.  She did have dialysis today.  No nausea, vomiting, or abdominal pain.  4/24: Patient was seen and examined.  -1000 mL since admission.  She is currently saturating well on room air.  MRI was done today and results are still pending.  Patient is scheduled for hemodialysis tomorrow.  Potential discharge home with [facility] within the next 24 to 48 hours.     Objective     Last Recorded Vitals  BP 94/58 (BP Location: Right arm, Patient Position: Lying)   Pulse 54   Temp 36.4 °C (97.6 °F) (Temporal)   Resp 16   Wt 69.2 kg (152 lb 8.9 oz)   SpO2 90%   Intake/Output last 3 Shifts:    Intake/Output Summary (Last 24 hours) at 4/24/2025 1219  Last data filed at 4/23/2025 1822  Gross per 24 hour   Intake 600 ml   Output 2524 ml   Net -1924 ml       Admission Weight  Weight: 70.8 kg (156 lb) (04/22/25 1520)    Daily Weight  04/24/25 : 69.2 kg (152 lb 8.9 oz)    Image Results  ECG 12 lead  Sinus rhythm      Physical Exam  Vitals reviewed.   Constitutional:       Appearance: Normal appearance.   HENT:      Head: Normocephalic and atraumatic.   Eyes:      Extraocular Movements: Extraocular movements intact.      Conjunctiva/sclera: Conjunctivae normal.   Cardiovascular:      Rate and Rhythm: Normal rate and regular rhythm.   Pulmonary:      Effort: Pulmonary effort is normal.      Breath sounds: No wheezing, rhonchi or rales.      Comments: Breath sounds clear, diminished bilaterally.  Remains on supplemental oxygen  Abdominal:      General: Bowel sounds are normal.      Palpations: Abdomen is soft.      Tenderness: There is no abdominal tenderness.   Skin:     General: Skin is warm and dry.   Neurological:      General: No focal deficit present.      Mental Status: She is alert and oriented to person, place, and time.       Relevant Results  Lab Results   Component Value Date    GLUCOSE 105 (H) 04/24/2025    CALCIUM 8.7 04/24/2025     04/24/2025    K  4.1 04/24/2025    CO2 30 04/24/2025     04/24/2025    BUN 16 04/24/2025    CREATININE 2.27 (H) 04/24/2025     Lab Results   Component Value Date    WBC 3.9 (L) 04/24/2025    HGB 7.8 (L) 04/24/2025    HCT 24.3 (L) 04/24/2025    MCV 94 04/24/2025     04/24/2025    Transthoracic Echo (TTE) Complete  Result Date: 4/23/2025   CONCLUSIONS:  1. The left ventricular systolic function is normal, with a Torres's biplane calculated ejection fraction of 64%.  2. Spectral Doppler shows a Grade II (pseudonormal pattern) of left ventricular diastolic filling with an elevated left atrial pressure.  3. There is normal right ventricular global systolic function.  4. The left atrial size is moderately dilated.  5. Mildly elevated right ventricular systolic pressure.  6. There is moderately increased septal and mildly increased posterior left ventricular wall thickness. Tanmay Alexander MD Electronically signed on 4/23/2025 at 4:06:08 PM  ** Final **     Point of Care Ultrasound  Result Date: 4/22/2025  Rc Marinelli DO     4/23/2025  9:40 AM Performed by: Rc Marinelli DO Authorized by: Rc Marinelli DO  Respiratory Indications: hypoxia Procedure: Cardiac Ultrasound Findings: The pericardial space was visualized and was NEGATIVE for a significant pericardial effusion. Activity: Ventricular contractions were visualized. LV: LV systolic function was NORMAL. and low-normal range Impression: Cardiac: The focused cardiac ultrasound exam had ABNORMAL findings as specified. Comments: In the aortic root, hyper echoic line present that could potentially be dissection flap versus artifact.  CT angio was ordered for further evaluation    CT angio chest abdomen pelvis  Result Date: 4/22/2025  No evidence of aortic aneurysm, dissection, or acute intramural hematoma.   Pulmonary edema with small right and trace left pleural effusions.   Small subcentimeter hypoattenuating lesion in the pancreatic neck favored to represent a  small side duct IPMN. Recommend further evaluation with pancreas MRI.   Indeterminate left renal lesion, with suggestion of internal enhancement. Recommend attention during follow-up on MRI.   Mild diffuse bladder wall thickening and adjacent stranding, correlate clinically for cystitis.   Colonic diverticulosis.   Signed by: Brittney Hughes 4/22/2025 6:03 PM Dictation workstation:   [id number]    XR chest 1 view  Result Date: 4/22/2025  No acute cardiopulmonary disease.  There is no significant change when compared to the prior chest x-ray.. Signed by Jose Miguel Marmolejo MD    CT head wo IV contrast  Result Date: 4/22/2025  No acute intracranial abnormality. Consider follow-up with MRI as warranted.     Signed by: Brittney Hughes 4/22/2025 5:50 PM Dictation workstation:   [id number]    ECG 12 Lead  Result Date: 4/1/2025  Sinus rhythm, PVCs, heart rate 60 bpm.        Assessment & Plan  Hypoxemia requiring supplemental oxygen  Acute hypoxic respiratory failure  Likely secondary to acute on chronic diastolic heart failure, acute pulmonary edema  TTE showed normal EF of 64%  CT angio chest reviewed showing pulmonary edema and effusions  Monitor on telemetry  Supplemental oxygen, wean as tolerated  IV diuresis with Lasix    Acute on chronic diastolic heart failure  Resume GDMT with carvedilol and losartan  Continue IV diuresis with Lasix  Daily weights  Strict I/Os  BNP  Echocardiogram reviewed showed EF of 64% with pseudonormal diastolic filling    Cardiologist on board    Uncontrolled stage 2 hypertension  Continue antihypertensives  Monitor blood pressure  Cardiology following, appreciate recommendations  Acute pulmonary edema  See above  ESRD on hemodialysis (Multi)  Nephrology consult regarding resumption of hemodialysis regimen.  Anemia in chronic kidney disease, on chronic dialysis  Hemoglobin decreased to 6.2  Status post 1 unit of PRBCs  Repeat H&H 9.1/28.6, repeat in a.m.  No active bleeding  reported  Transfuse for hemoglobin less than 7    Plan  MRI pending results  Hemodialysis for tomorrow  Monitor I's and O's, daily weights  Diuresis per cardiology  Dialysis per nephrology  Oxygen weaned off  Trend H&H, transfusion as needed  DVT prophylaxis with subcut heparin  PT/OT  CBC and BMP in the morning       Spent 35 minutes in follow-up management of this patient      Naga Carr MD

## 2025-04-24 NOTE — ASSESSMENT & PLAN NOTE
Acute hypoxic respiratory failure  Likely secondary to acute on chronic diastolic heart failure, acute pulmonary edema  TTE showed normal EF of 64%  CT angio chest reviewed showing pulmonary edema and effusions  Monitor on telemetry  Supplemental oxygen, wean as tolerated  IV diuresis with Lasix    Acute on chronic diastolic heart failure  Resume GDMT with carvedilol and losartan  Continue IV diuresis with Lasix  Daily weights  Strict I/Os  BNP  Echocardiogram reviewed showed EF of 64% with pseudonormal diastolic filling    Cardiologist on board

## 2025-04-24 NOTE — CARE PLAN
The patient's goals for the shift include      The clinical goals for the shift include Patient will remain hemodynamically stable throught shift      Problem: Pain - Adult  Goal: Verbalizes/displays adequate comfort level or baseline comfort level  Outcome: Progressing     Problem: Safety - Adult  Goal: Free from fall injury  Outcome: Progressing     Problem: Discharge Planning  Goal: Discharge to home or other facility with appropriate resources  Outcome: Progressing     Problem: Chronic Conditions and Co-morbidities  Goal: Patient's chronic conditions and co-morbidity symptoms are monitored and maintained or improved  Outcome: Progressing     Problem: Nutrition  Goal: Nutrient intake appropriate for maintaining nutritional needs  Outcome: Progressing

## 2025-04-24 NOTE — PROGRESS NOTES
"      Nephrology Progress Note      Nephrology following for ESRD.   Events over night:   Patient is doing well, has been weaned off oxygen.    BP 94/58 (BP Location: Right arm, Patient Position: Lying)   Pulse 54   Temp 36.4 °C (97.6 °F) (Temporal)   Resp 16   Ht 1.549 m (5' 1\")   Wt 69.2 kg (152 lb 8.9 oz)   SpO2 90%   BMI 28.83 kg/m²     Input / Output:  24 HR:   Intake/Output Summary (Last 24 hours) at 4/24/2025 1510  Last data filed at 4/23/2025 1822  Gross per 24 hour   Intake --   Output 150 ml   Net -150 ml       Physical Exam   Alert and oriented x 3 NAD  Neck: no JVD  CV: RRR  Lungs: CTA bilaterally  Abd: soft, NT, ND   Ext: no lower extremity edema    Scheduled medications  Scheduled Medications[1]  Continuous medications  Continuous Medications[2]  PRN medications  PRN Medications[3]   Results from last 7 days   Lab Units 04/24/25  0413 04/23/25  0325   SODIUM mmol/L 137 139   POTASSIUM mmol/L 4.1 3.3*   CHLORIDE mmol/L 100 101   CO2 mmol/L 30 32   BUN mg/dL 16 23   CREATININE mg/dL 2.27* 2.58*   CALCIUM mg/dL 8.7 8.4*   PROTEIN TOTAL g/dL  --  5.5*   BILIRUBIN TOTAL mg/dL  --  1.0   ALK PHOS U/L  --  61   ALT U/L  --  11   AST U/L  --  12   GLUCOSE mg/dL 105* 133*      Results from last 7 days   Lab Units 04/23/25  0325   MAGNESIUM mg/dL 1.78      Results from last 7 days   Lab Units 04/24/25  0413 04/23/25  1307 04/23/25  0325 04/22/25  1538   WBC AUTO x10*3/uL 3.9*  --  3.9* 6.1   HEMOGLOBIN g/dL 7.8* 9.1* 6.2* 7.5*   HEMATOCRIT % 24.3* 28.6* 19.7* 23.5*   PLATELETS AUTO x10*3/uL 175  --  177 200        Assessment & Plan:   Patient is 80 y.o. female who is admitted to hospital for acute on chronic HFpEF. Nephrology consulted in view of ESRD.     ESRD  -HD RODERICK Flores on Monday Wednesday Friday  -Access is right IJ TDC  -Patient is compliant with dialysis treatment     Anemia of ESRD and MDS  -Requires frequent PRBCs transfusion and is on high-dose MARILYNN with dialysis  -Transfuse when symptomatic or " when hemoglobin less than 7     CKD-MBD  -Patient not currently on a phosphorus binder  -Calcium is normal  DM2  HTN        Recommendations:   -Continue losartan and carvedilol  - Plan dialysis tomorrow with UF  -noted IV Lasix ordered, she does still make urine but volume primarily managed through dialysis  - Continue to challenge and reestablish dry weight if needed     Please message me through EPIC chat with any questions or concerns.     Pinky Christie DO  4/24/2025  3:10 PM     [facility]    [address] [place] [address], [address]   [place] [id number]  Office: [phone]       [1] amLODIPine, 10 mg, oral, Daily  atorvastatin, 10 mg, oral, Nightly  carvedilol, 25 mg, oral, BID  cholecalciferol, 50 mcg, oral, Daily  cyanocobalamin, 1,000 mcg, oral, Daily  furosemide, 40 mg, intravenous, Daily  heparin, 2,000 Units, intra-catheter, After Dialysis  heparin, 2,000 Units, intra-catheter, After Dialysis  insulin lispro, 0-10 Units, subcutaneous, TID AC  losartan, 50 mg, oral, Daily  pantoprazole, 40 mg, oral, Daily  polyethylene glycol, 17 g, oral, Daily  pregabalin, 75 mg, oral, Daily    [2]    [3] PRN medications: acetaminophen, bisacodyl, cyclobenzaprine, dextrose, dextrose, glucagon, glucagon, guaiFENesin, hydrALAZINE, melatonin, ondansetron ODT **OR** ondansetron, ondansetron **OR** ondansetron

## 2025-04-24 NOTE — PROGRESS NOTES
Occupational Therapy  Evaluation    Patient Name: Tana Hargrove  MRN: [id number]  Today's Date: 4/24/2025  Time Calculation  Start Time: 0941  Stop Time: 1006  Time Calculation (min): 25 min    Current Problem:   1. Acute pulmonary edema    2. History of end stage renal disease    3. Acute hypoxic respiratory failure        OT order: OT eval and treat   Referred by: Joe  Reason for referral: ADLs, safety assessment  Past medical history related to rehab: hypertension, hyperlipidemia, DM2, anemia of CKD 5, end-stage renal disease on hemodialysis MWF via right IJ tunneled permacath.    Precautions:   Hearing/Visual Limitations: intact  Medical Precautions: Fall precautions, Oxygen therapy device and L/min    ASSESSMENT  OT Assessment: OT eval completed. The patient is functioning below baseline for ADLs and mobility. can benefit from continued OT. Pt with Decreased ADL status, Decreased safe judgment during ADL, Decreased cognition, Decreased endurance, Decreased functional mobility, Decreased gross motor control, Decreased IADLs  Prognosis:    Barriers to discharge home: Physical needs, Caregiver assistance  Patient lives alone and/or does not have reliable caregiver assistance  Intermittent mobility assistance needed, Intermittent ADL assistance needed     Tolerance:      PLAN  Frequency: 3 times per week  Treatment Interventions: ADL retraining, Functional transfer training, UE strengthening/ROM, Cognitive reorientation, Endurance training, Patient/family training, Equipment evaluation/education, Neuromuscular reeducation  Discharge Recommendations: Moderate intensity level of continued care, Low intensity level of continued care  OT OK to discharge: Yes    GENERAL VISIT INFORMATION   Start of session communication: Bedside nurse  End of session communication: Bedside nurse  Family/caregiver present: Yes  Caregiver feedback: daughter arrived at end of session  Co-Treatment: PT  Reason for co-treatment: to  optimize safety and mobility, while focusing on discipline specific goals   Position Pt Received:  Bed, 3 rail up, Alarm on  End of session position: Up in chair, Alarm on    SUBJECTIVE  Home Living:  Type of Home: Condo  Lives With: Alone  Home Adaptive Equipment: Walker rolling or standard  Home Layout: One level, Laundry main level  Home Access: Level entry  Bathroom Shower/Tub: Walk-in shower  Bathroom Equipment: Shower chair with back (GB outside shower)  Home Living Comments: friend drives to dialysis M/W/F     Prior Level of Function:  Level of [place]: Independent with ADLs and functional transfers, Independent with homemaking with ambulation  Receives Help From: Family (dtr supportive)  ADL Assistance: Independent  Homemaking Assistance: Independent  Ambulatory Assistance: Needs assistance  Transfers: Assistive device  Gait: Assistive device (uses ROLLATOR)    IADL History:       Pain:  Assessment: 0-10  Score: 0 - No pain  Type:    Location:    Interventions:    Response to pain interventions:      OBJECTIVE  Vital Signs:       Cognition:  Overall Cognitive Status: Within Functional Limits  Orientation Level: Oriented X4             Current ADL function:   EATING:  Stand by     GROOMING: Stand by     BATHING: Maximal     UB DRESSING: Minimal     LB DRESSING: Moderate     TOILETING: Moderate    ADL comments:       Activity Tolerance:  Endurance: Decreased tolerance for upright activites    Bed Mobility/Transfers:   Bed Mobility  Bed Mobility: Yes  Bed Mobility 1  Bed Mobility 1: Supine to sitting  Level of Assistance 1: Contact guard  Transfers  Transfer:  (sit<>stand CGAx1)    Ambulation/Gait Training:  Functional Mobility  Functional Mobility Performed:  (pt performed functional mobility around bedside to chair with min A x1+1 FWW. min VC)    Sitting Balance:  Static Sitting Balance  Static Sitting-Level of Assistance: Close supervision  Dynamic Sitting Balance  Dynamic Sitting-Level of Assistance:  Close supervision    Standing Balance:  Static Standing Balance  Static Standing-Level of Assistance: Contact guard  Dynamic Standing Balance  Dynamic Standing-Level of Assistance: Contact guard, Minimum assistance    Vision: Vision - Basic Assessment  Current Vision: No visual deficits   and      Sensation:  Light Touch: No apparent deficits    Strength:  Strength Comments: BUE grossly 4-/5    Perception:  Inattention/Neglect: Appears intact    Coordination:  Movements are Fluid and Coordinated: Yes     Hand Function:  Hand Function  Gross Grasp: Functional    Extremities: RUE   RUE : Within Functional Limits and LUE   LUE: Within Functional Limits    Outcome Measures: [facility] Daily Activity   Putting on and taking off regular lower body clothing: A lot  Bathing (including washing, rinsing, drying): A little  Putting on and taking off regular upper body clothing: A little  Toileting, which includes using toilet, bedpan or urinal: A lot  Taking care of personal grooming such as brushing teeth: A little   Eating Meals: A little   Daily Activity - Total Score: 16    EDUCATION:     Education Documentation  ADL Training, taught by Ana Milner OT at 4/24/2025  1:47 PM.  Learner: Patient  Readiness: Acceptance  Method: Explanation  Response: Needs Reinforcement    Education Comments  No comments found.        Goals:   Encounter Problems       Encounter Problems (Active)       ADLs       Patient with complete lower body dressing with modified independent level of assistance donning and doffing all LE clothes  with PRN adaptive equipment while supported sitting and standing (Progressing)       Start:  04/24/25    Expected End:  05/08/25               MOBILITY       Patient will perform Functional mobility max Household distances/Community Distances with modified independent level of assistance and least restrictive device in order to improve safety and functional mobility. (Progressing)       Start:  04/24/25    Expected  End:  05/08/25

## 2025-04-24 NOTE — PROGRESS NOTES
Patient is being recommended for C on discharge. As discussed previously with patient, she prefers to return home with [facility]. Referral sent.     [id number] [facility] can accept patient.

## 2025-04-24 NOTE — PROGRESS NOTES
[facility]  Patient Name: Tana Hargrove  Patient : 1945  Room/Bed:     HPI:  Tana Hargrove is a 80 y.o. female who presented to the emergency department with shortness of breath and generalized weakness.  Patient states that she noticed increased shortness of breath for proximately 2 days prior to admission.  She stated that on Monday after dialysis she noted she was quite weak and short of breath.  As her symptoms continue to worsen she decided come to the emergency department for evaluation.  BMP shows a serum sodium 139, serum potassium 3.3, serum creatinine of 2.58.  CK was 18.  Serum magnesium was 1.78.  Troponin was 35-32.  TSH was 1.51.  CBC shows a hemoglobin of 6.2. CT scan of the head done 2025 showed no acute intracranial abnormality.  CTA of the chest/abdomen/pelvis showed no evidence of aortic aneurysm, dissection, or acute intramural hematoma, pulmonary edema with small right and trace left pleural effusion, small subcentimeter hypoattenuation lesion in the pancreatic neck, indeterminate left renal lesion, mild diffuse bladder wall thickening, colonic diverticulosis.  During my exam the patient was resting comfortably in bed.     Allergies:  Codeine, Hydrocodone, Hydrocodone-acetaminophen, Tramadol, Piperacillin-tazobactam, Adhesive tape-silicones, and Meperidine    Subjective Data:  24: Patient sitting up in chair.  Reports improvement in SOB. She states she had ham on 25 and feels that this contributed to SOB.  She was on 2L and has been weaned to RA. MRCP panreas pending. SR on telemetry with HR 63 bpm.    Overnight Events:  None    Objective Data:  Vitals:    25 1939 25 2351 25 0340 25 0728   BP: 127/64 145/70 150/58 150/72   BP Location: Right arm Left arm Right arm Right arm   Patient Position: Lying Lying Lying Lying   Pulse: 57 58 54 55   Resp: 18 18 18 18   Temp: 36.2 °C (97.1 °F) 36.4 °C  "(97.5 °F) 36.9 °C (98.5 °F) 37 °C (98.6 °F)   TempSrc: Temporal Temporal Temporal Temporal   SpO2: 94% 92% 98% 94%   Weight:   69.2 kg (152 lb 8.9 oz)    Height:           Reviewed the following Labs:  Results from last 7 days   Lab Units 04/24/25  0413 04/23/25  1307 04/23/25  0325 04/22/25  1538   WBC AUTO x10*3/uL 3.9*  --  3.9* 6.1   HEMOGLOBIN g/dL 7.8* 9.1* 6.2* 7.5*   HEMATOCRIT % 24.3* 28.6* 19.7* 23.5*   PLATELETS AUTO x10*3/uL 175  --  177 200       Results from last 7 days   Lab Units 04/24/25  0413 04/23/25  0325 04/22/25  1538   SODIUM mmol/L 137 139 138   POTASSIUM mmol/L 4.1 3.3* 3.5   CHLORIDE mmol/L 100 101 101   CO2 mmol/L 30 32 31   BUN mg/dL 16 23 21   CREATININE mg/dL 2.27* 2.58* 2.40*   CALCIUM mg/dL 8.7 8.4* 8.9   PROTEIN TOTAL g/dL  --  5.5* 6.4   BILIRUBIN TOTAL mg/dL  --  1.0 1.0   ALK PHOS U/L  --  61 70   ALT U/L  --  11 11   AST U/L  --  12 14   GLUCOSE mg/dL 105* 133* 169*       Results from last 7 days   Lab Units 04/23/25  0325   MAGNESIUM mg/dL 1.78       No results found for: \"LDLF\"     No results found for: \"BNP\"    Lab Results   Component Value Date    TROPHS 32 (H) 04/23/2025    TROPHS 35 (H) 04/22/2025        Lab Results   Component Value Date    TSH 1.51 04/23/2025           Lab Results   Component Value Date    HGBA1C 6.3 (H) 03/14/2024       Intake/Output    Intake/Output Summary (Last 24 hours) at 4/24/2025 0850  Last data filed at 4/23/2025 1822  Gross per 24 hour   Intake 1480 ml   Output 2524 ml   Net -1044 ml      I/O last 2 completed shifts:  In: 1480 (21.4 mL/kg) [I.V.:600 (8.7 mL/kg); Blood:480; Other:400]  Out: 2524 (36.5 mL/kg) [Urine:150 (0.1 mL/kg/hr); Other:2374]  Weight: 69.2 kg     Past Medical History:  She has a past medical history of Abnormal levels of other serum enzymes, Acute kidney failure, Anemia, CKD (chronic kidney disease), COPD (chronic obstructive pulmonary disease) (Multi), Coronary artery disease, Disease of blood and blood-forming organs, " unspecified, HLD (hyperlipidemia), Hypertension, MDS (myelodysplastic syndrome) (Multi), Personal history of other diseases of the musculoskeletal system and connective tissue, Personal history of other specified conditions, Personal history of other specified conditions, Type 2 diabetes mellitus, and Urinary tract infection, site not specified (10/17/2024).    She has no past medical history of Adverse effect of anesthesia, Arthritis, Asthma, Awareness under anesthesia, CHF (congestive heart failure), Delayed emergence from general anesthesia, Disease of thyroid gland, Hard to intubate, History of transfusion, Malignant hyperthermia, PONV (postoperative nausea and vomiting), Pseudocholinesterase deficiency, Sickle cell anemia, Spinal headache, or Stroke (Multi).    Past Surgical History:  She has a past surgical history that includes Other surgical history (12/13/2019); Other surgical history (12/13/2019); Other surgical history (Bilateral, 12/13/2019); Other surgical history (Left, 12/13/2019); Other surgical history (12/13/2019); Other surgical history (12/13/2019); Other surgical history (Right, 12/13/2019); Other surgical history (04/16/2021); MR angio head wo IV contrast (11/20/2020); MR angio neck wo IV contrast (11/20/2020); Breast biopsy (Left); Hysterectomy; Appendectomy; Colonoscopy; Oophorectomy; Joint replacement; and Cholecystectomy (06/06/2024).      Social History:  She reports that she has never smoked. She has never been exposed to tobacco smoke. She has never used smokeless tobacco. She reports that she does not currently use alcohol. She reports that she does not use drugs.    Family History:  Family History[1]    Outpatient Medications  Medications Ordered Prior to Encounter[2]    Scheduled medications  Scheduled Medications[3]  Continuous medications  Continuous Medications[4]  PRN medications  PRN Medications[5]   Prescriptions Prior to Admission[6]    Reviewed the following cardiology  tests:    Echo 4/23/25:   1. The left ventricular systolic function is normal, with a Torres's biplane calculated ejection fraction of 64%.   2. Spectral Doppler shows a Grade II (pseudonormal pattern) of left ventricular diastolic filling with an elevated left atrial pressure.   3. There is normal right ventricular global systolic function.   4. The left atrial size is moderately dilated.   5. Mildly elevated right ventricular systolic pressure.   6. There is moderately increased septal and mildly increased posterior left ventricular wall thickness.  EF   Date/Time Value Ref Range Status   04/23/2025 03:18 PM 64 %    02/15/2024 02:13 PM 67 %       Stress test 4/8/22:  1. No clinical or electrocardiographic evidence for ischemia at maximal infusion.  2. Nuclear image results are reported separately.  1. There is a small fixed perfusion defect in the mid  anterior/anteroseptal wall with associated wall motion abnormality  consistent with prior infarct. There is a low probability of ischemia.  2. Calculated ejection fraction 50% mild hypokinesis of the mid  anterior wall.    Physical Exam:  Vitals reviewed.   Constitutional:       Appearance: Not in distress.   Neck:      Vascular: No carotid bruit.   Pulmonary:      Effort: Pulmonary effort is normal.      Breath sounds: Normal breath sounds.   Cardiovascular:      PMI at left midclavicular line. Normal rate. Regular rhythm. S1 with normal intensity. S2 with normal intensity.       Murmurs: There is no murmur.   Edema:     Peripheral edema absent.   Abdominal:      General: Bowel sounds are normal.   Neurological:      Mental Status: Alert and oriented to person, place and time.       Assessment/Plan:   1.  Acute on chronic HFpEF  Patient presented with increased shortness of breath subsequently found to have BNP elevated at 223 and CTA of the chest showing evidence of pulmonary edema and pleural effusions.  Patient given a dose of IV furosemide although fluid  management will be primarily through dialysis sessions.  Echocardiogram done in February 2024 showed normal left ventricular systolic function with an ejection fraction of 60%, grade 1 diastolic dysfunction, normal right ventricular systolic function, mild to moderate tricuspid valve regurgitation.  Updated echocardiogram has been ordered.  Continue to monitor urine output and serum electrolytes while here.  4/24/25: She does not appear significantly volume overloaded on exam. SOB improved.  She feels that eating ham over the weekend caused SOB. Fluid management will be primarily through dialysis sessions. Continue carvedilol 25 mg BID and losartan 50 mg daily.     2.  CKD/end-stage renal disease  Patient has a history of end-stage renal disease on dialysis.  Management per nephrology.     3.  Paroxysmal atrial fibrillation  Patient has a history of atrial fibrillation not on anticoagulation secondary to MDS/anemia.  Telemetry showing sinus rhythm with heart rate in the 60s.  Can continue carvedilol for heart rate control.  Continue to monitor on telemetry while here.  4/24/25: SR on telemetry with HR 63 bpm. Continue carvedilol. Not on anticoagulation secondary to MDS/anemia.     4.  Hypertension  Blood pressure is suboptimally controlled this morning.  Patient is to be restarted on home antihypertensive medical therapy.  Continue to monitor and adjust antihypertensive medical therapy as necessary.  4/24/25: BP slightly elevated, but just received morning antihypertensives.  Continue to monitor BP and adjust antihypertensives as needed.      5.  Dyslipidemia  Continue atorvastatin therapy     6.  Diabetes mellitus  Stable, management per hospitalist service     7.  MDS/anemia  Hemoglobin of 6.2 this morning.  Management per hospitalist service.    8. 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 MRCP pancreas pending. Further management per hospitalist.    Cardiology team is signing off.  Please call for questions or if further follow-up is needed.     Code Status  Full Code      Nadya ROY Hal, APRN-CNP          [1]   Family History  Problem Relation Name Age of Onset    Heart disease Mother      Heart attack Father      Hodgkin's lymphoma Son     [2]   No current facility-administered medications on file prior to encounter.     Current Outpatient Medications on File Prior to Encounter   Medication Sig Dispense Refill    allopurinol (Zyloprim) 100 mg tablet Take 1 tablet (100 mg) by mouth every 12 hours.      amLODIPine (Norvasc) 10 mg tablet Take 1 tablet (10 mg) by mouth once daily.      atorvastatin (Lipitor) 10 mg tablet Take 1 tablet (10 mg) by mouth once daily. 90 tablet 1    carvedilol (Coreg) 25 mg tablet Take 1 tablet (25 mg) by mouth 2 times a day. 180 tablet 1    cholecalciferol (Vitamin D-3) 50 MCG (2000 UT) tablet Take 1 tablet (50 mcg) by mouth once daily.      cyanocobalamin (Vitamin B-12) 1,000 mcg tablet Take 1 tablet (1,000 mcg) by mouth once daily.      losartan (Cozaar) 50 mg tablet Take 1 tablet (50 mg) by mouth once daily. 90 tablet 1    pantoprazole (ProtoNix) 40 mg EC tablet Take 1 tablet (40 mg) by mouth once daily in the morning. Take before meals. Do not crush, chew, or split. 30 tablet 2    pioglitazone (Actos) 15 mg tablet Take 1 tablet (15 mg) by mouth once daily. 90 tablet 1    pregabalin (Lyrica) 100 mg capsule Take 1 capsule (100 mg) by mouth 2 times a day. 60 capsule 0   [3] amLODIPine, 10 mg, oral, Daily  atorvastatin, 10 mg, oral, Nightly  carvedilol, 25 mg, oral, BID  cholecalciferol, 50 mcg, oral, Daily  cyanocobalamin, 1,000 mcg, oral, Daily  furosemide, 40 mg, intravenous, Daily  gadoterate meglumine, 0.2 mL/kg, intravenous, Once in imaging  heparin, 2,000 Units, intra-catheter, After Dialysis  heparin, 2,000 Units, intra-catheter, After Dialysis  insulin  lispro, 0-10 Units, subcutaneous, TID AC  losartan, 50 mg, oral, Daily  pantoprazole, 40 mg, oral, Daily  polyethylene glycol, 17 g, oral, Daily  pregabalin, 75 mg, oral, Daily    [4]    [5] PRN medications: acetaminophen, bisacodyl, cyclobenzaprine, dextrose, dextrose, glucagon, glucagon, guaiFENesin, hydrALAZINE, melatonin, ondansetron ODT **OR** ondansetron, ondansetron **OR** ondansetron  [6]   Medications Prior to Admission   Medication Sig Dispense Refill Last Dose/Taking    allopurinol (Zyloprim) 100 mg tablet Take 1 tablet (100 mg) by mouth every 12 hours.       amLODIPine (Norvasc) 10 mg tablet Take 1 tablet (10 mg) by mouth once daily.       atorvastatin (Lipitor) 10 mg tablet Take 1 tablet (10 mg) by mouth once daily. 90 tablet 1     carvedilol (Coreg) 25 mg tablet Take 1 tablet (25 mg) by mouth 2 times a day. 180 tablet 1     cholecalciferol (Vitamin D-3) 50 MCG (2000 UT) tablet Take 1 tablet (50 mcg) by mouth once daily.       cyanocobalamin (Vitamin B-12) 1,000 mcg tablet Take 1 tablet (1,000 mcg) by mouth once daily.       losartan (Cozaar) 50 mg tablet Take 1 tablet (50 mg) by mouth once daily. 90 tablet 1     pantoprazole (ProtoNix) 40 mg EC tablet Take 1 tablet (40 mg) by mouth once daily in the morning. Take before meals. Do not crush, chew, or split. 30 tablet 2     pioglitazone (Actos) 15 mg tablet Take 1 tablet (15 mg) by mouth once daily. 90 tablet 1     pregabalin (Lyrica) 100 mg capsule Take 1 capsule (100 mg) by mouth 2 times a day. 60 capsule 0

## 2025-04-24 NOTE — PROGRESS NOTES
"Physical Therapy    Physical Therapy Evaluation    Patient Name: Tana Hargrove  MRN: [id number]  Today's Date: 4/24/2025   Time Calculation  Start Time: 0950  Stop Time: 1006  Time Calculation (min): 16 min  2012/2012-A    Assessment/Plan   PT Assessment  PT Assessment Results: Decreased strength, Decreased endurance, Decreased mobility  Rehab Prognosis: Good  Barriers to Discharge Home: Caregiver assistance  Caregiver Assistance: Patient lives alone and/or does not have reliable caregiver assistance  Evaluation/Treatment Tolerance: Patient tolerated treatment well  End of Session Communication: Bedside nurse  Assessment Comment: Recommend LOW INTENSITY REHAB, pt had just started Home PT prior to this adm, concern for pt fall at home. dtr present and did not vocalize disagreement with pt wishes to return home.  End of Session Patient Position: Alarm on, Up in chair  IP OR SWING BED PT PLAN  Inpatient or Swing Bed: Inpatient  PT Plan  Treatment/Interventions: Bed mobility, Transfer training, Gait training, Neuromuscular re-education, Therapeutic exercise, Therapeutic activity  PT Plan: Ongoing PT  PT Frequency: 4 times per week  PT Discharge Recommendations: Low intensity level of continued care  PT - OK to Discharge: Yes    Subjective     Current Problem:  1. Acute pulmonary edema        2. History of end stage renal disease        3. Acute hypoxic respiratory failure  Transthoracic Echo (TTE) Complete    Transthoracic Echo (TTE) Complete        Problem List[1]    General Visit Information:  General  Reason for Referral: fall Tues \"just went down\". couldnt breath. Recent adm 3/19-3/26 for influenza  Referred By: RAMON ANGULO. PT FOR IMPAIRED MOBILITY  Past Medical History Relevant to Rehab: hypertension, hyperlipidemia, DM2, anemia of CKD 5, end-stage renal disease on hemodialysis MWF via right IJ tunneled permacath.  Family/Caregiver Present: Yes  Caregiver Feedback: DTR arrived during PT session and observed " ending  Co-Treatment: OT  Co-Treatment Reason: optimize pt safety while focus on discipline specific goals  Prior to Session Communication: Bedside nurse  Patient Position Received: Bed, 3 rail up, Alarm on  General Comment: pt seen in 2012, pleasant and cooperative, O2    Home Living:  Home Living  Type of Home: Condo  Lives With: Alone  Home Adaptive Equipment: Walker rolling or standard  Home Layout: One level, Laundry main level  Home Access: Level entry  Bathroom Shower/Tub: Walk-in shower  Bathroom Equipment: Shower chair with back (GB outside shower)  Home Living Comments: friend drives to dialysis M/W/F    Prior Level of Function:  Prior Function Per Pt/Caregiver Report  Level of [place]: Independent with ADLs and functional transfers, Independent with homemaking with ambulation  Receives Help From: Family (dtr supportive)  Ambulatory Assistance: Needs assistance  Transfers: Assistive device  Gait: Assistive device (uses ROLLATOR)    Precautions:  Precautions  Medical Precautions: Fall precautions, Oxygen therapy device and L/min  Precautions Comment: Dialysis M/W/F. no home O2, met criteria for Home O2 on recent adm but declined Home O2     Objective     Pain:  Pain Assessment  Pain Assessment: 0-10  0-10 (Numeric) Pain Score: 0 - No pain    Cognition:  Cognition  Overall Cognitive Status: Within Functional Limits  Orientation Level: Oriented X4    General Assessments:  General Observation  General Observation: transfer OOB, gait training around bed, set up in chair with meal tray, alarm ON/dtr present   Activity Tolerance  Endurance: Tolerates 10 - 20 min exercise with multiple rests  Activity Tolerance Comments: asymptomatic           Coordination  Movements are Fluid and Coordinated: Yes  Postural Control  Postural Control: Within Functional Limits  Dynamic Sitting Balance  Dynamic Sitting-Comments: good  Dynamic Standing Balance  Dynamic Standing-Comments: fair- walker support plus assist, no LOB  observed    Functional Assessments:     Bed Mobility 1  Bed Mobility 1: Supine to sitting  Level of Assistance 1: Contact guard  Transfer 1  Transfer From 1: Bed to  Transfer to 1: Chair with arms  Technique 1: Sit to stand, Stand to sit  Transfer Device 1: Walker, Gait belt  Transfer Level of Assistance 1: Minimum assistance, +1 to manage equipment  Ambulation/Gait Training 1  Device 1: Rolling walker  Gait Support Devices: Gait belt  Assistance 1: Minimum assistance  Comments/Distance (ft) 1: 18  Stairs  Stairs: No       Extremity/Trunk Assessments:        RLE   RLE : Exceptions to WFL  AROM RLE (degrees)  RLE AROM Comment: WFL  Strength RLE  RLE Overall Strength: Deficits (slightly weaker (grossly 4-/5 compared to 4/5 LLE))  LLE   LLE : Within Functional Limits    Outcome Measures:     [facility] Basic Mobility  Turning from your back to your side while in a flat bed without using bedrails: None  Moving from lying on your back to sitting on the side of a flat bed without using bedrails: A little  Moving to and from bed to chair (including a wheelchair): A little  Standing up from a chair using your arms (e.g. wheelchair or bedside chair): A little  To walk in hospital room: A little  Climbing 3-5 steps with railing: Total  Basic Mobility - Total Score: 17       Goals:  Encounter Problems       Encounter Problems (Active)       Mobility       STG - Patient will ambulate household distance using walker modified indep for living alone       Start:  04/24/25    Expected End:  05/08/25               PT Transfers       STG - Patient will demonstrate safe transfer techniques using walker modified indep for living alone       Start:  04/24/25    Expected End:  05/08/25               Pain - Adult          Strengthening        Pt will perform 10+ reps AAROM/AROM/RROM BLE to improve functional strength needed for improved mobility.        Start:  04/24/25    Expected End:  05/08/25                 Education  Documentation  Mobility Training, taught by Vivian Huerta, PT at 4/24/2025 12:16 PM.  Learner: Patient  Readiness: Acceptance  Method: Explanation, Demonstration  Response: Verbalizes Understanding, Needs Reinforcement    Education Comments  No comments found.              [1]   Patient Active Problem List  Diagnosis    Lumbago    A-fib (Multi)    Anxiety    Chronic diastolic heart failure of unknown etiology    Cervical spondylosis without myelopathy    Coronary artery disease    Degeneration of intervertebral disc of lumbar region    Hypertension, essential    Frequent falls    GERD (gastroesophageal reflux disease)    Hyperlipidemia    Inability to ambulate due to multiple joints    Jerking movements of extremities    Spinal stenosis of lumbar region    Lumbar spondylosis    Macrocytic anemia    Myelodysplastic syndrome (Multi)    Myofascial pain syndrome    Occasional tremors    Osteoporosis    Hypoxemia requiring supplemental oxygen    Stage 4 chronic kidney disease (Multi)    Weakness generalized    Gout    Scoliosis of lumbar region due to degenerative disease of spine in adult    Depression    Lower extremity edema    Diabetes mellitus with complication (Multi)    COPD without exacerbation (Multi)    Primary osteoarthritis of right knee    Fall    Acute kidney failure, unspecified    Muscle weakness (generalized)    Primary osteoarthritis    Repeated falls    Paroxysmal atrial fibrillation (Multi)    Chronic diastolic (congestive) heart failure    Chronic obstructive pulmonary disease, unspecified    CKD (chronic kidney disease)    Normocytic anemia    Uncontrolled stage 2 hypertension    HLD (hyperlipidemia)    Type 2 diabetes mellitus with hyperglycemia, without long-term current use of insulin    Type 2 diabetes mellitus without complications    Obesity (BMI 30-39.9)    Cellulitis of toe of right foot    Acute kidney injury (nontraumatic) (CMS-HCC)    ESRD (end stage renal disease) on dialysis (Multi)     ESRD on hemodialysis (Multi)    Thickening of wall of gallbladder    Allergy, unspecified, sequela    Anaphylactic shock, unspecified, sequela    Articular cartilage disorder of hip    Bone marrow disorder    Coagulation defect, unspecified    Contact with and (suspected) exposure to covid-19    Fracture of bone of hip (Multi)    History of anemia    Hyperkalemia    Pain of lower extremity    Pain, unspecified    Plantar fasciitis    Pneumonia due to infectious organism    Chest pain    Acute pulmonary edema    Elevated bilirubin    Atrial fibrillation with rapid ventricular response (Multi)    Calculus of gallbladder with acute cholecystitis with obstruction    Generalized abdominal pain    Anemia in chronic kidney disease, on chronic dialysis    Hypokalemia    Generalized weakness    (HFpEF) heart failure with preserved ejection fraction    Acute cystitis without hematuria    Peripheral vascular disease, unspecified (CMS-HCC)    Acute hypoxic respiratory failure    Other specified diabetes mellitus with diabetic neuropathy, unspecified

## 2025-04-24 NOTE — ASSESSMENT & PLAN NOTE
Hemoglobin decreased to 6.2  Status post 1 unit of PRBCs  Repeat H&H 9.1/28.6, repeat in a.m.  No active bleeding reported  Transfuse for hemoglobin less than 7    Plan  MRI pending results  Hemodialysis for tomorrow  Monitor I's and O's, daily weights  Diuresis per cardiology  Dialysis per nephrology  Oxygen weaned off  Trend H&H, transfusion as needed  DVT prophylaxis with subcut heparin  PT/OT  CBC and BMP in the morning

## 2025-04-25 ENCOUNTER — APPOINTMENT (OUTPATIENT)
Dept: DIALYSIS | Facility: HOSPITAL | Age: 80
End: 2025-04-25
Payer: MEDICARE

## 2025-04-25 VITALS
DIASTOLIC BLOOD PRESSURE: 68 MMHG | SYSTOLIC BLOOD PRESSURE: 161 MMHG | BODY MASS INDEX: 29.68 KG/M2 | OXYGEN SATURATION: 92 % | TEMPERATURE: 98.7 F | RESPIRATION RATE: 18 BRPM | HEART RATE: 62 BPM | WEIGHT: 157.19 LBS | HEIGHT: 61 IN

## 2025-04-25 PROBLEM — J81.0 ACUTE PULMONARY EDEMA: Status: RESOLVED | Noted: 2024-04-20 | Resolved: 2025-04-25

## 2025-04-25 LAB
ATRIAL RATE: 82 BPM
GLUCOSE BLD MANUAL STRIP-MCNC: 137 MG/DL (ref 74–99)
GLUCOSE BLD MANUAL STRIP-MCNC: 168 MG/DL (ref 74–99)
GLUCOSE BLD MANUAL STRIP-MCNC: 205 MG/DL (ref 74–99)
P AXIS: 43 DEGREES
PR INTERVAL: 160 MS
Q ONSET: 249 MS
QRS COUNT: 13 BEATS
QRS DURATION: 84 MS
QT INTERVAL: 417 MS
QTC CALCULATION(BAZETT): 484 MS
QTC FREDERICIA: 460 MS
R AXIS: 30 DEGREES
STAPHYLOCOCCUS SPEC CULT: NORMAL
T AXIS: 45 DEGREES
T OFFSET: 458 MS
VENTRICULAR RATE: 81 BPM

## 2025-04-25 PROCEDURE — 2500000004 HC RX 250 GENERAL PHARMACY W/ HCPCS (ALT 636 FOR OP/ED): Mod: JZ | Performed by: INTERNAL MEDICINE

## 2025-04-25 PROCEDURE — 82947 ASSAY GLUCOSE BLOOD QUANT: CPT

## 2025-04-25 PROCEDURE — 8010000001 HC DIALYSIS - HEMODIALYSIS PER DAY

## 2025-04-25 PROCEDURE — 2500000004 HC RX 250 GENERAL PHARMACY W/ HCPCS (ALT 636 FOR OP/ED): Performed by: INTERNAL MEDICINE

## 2025-04-25 PROCEDURE — 99239 HOSP IP/OBS DSCHRG MGMT >30: CPT | Performed by: INTERNAL MEDICINE

## 2025-04-25 PROCEDURE — 2500000001 HC RX 250 WO HCPCS SELF ADMINISTERED DRUGS (ALT 637 FOR MEDICARE OP): Performed by: INTERNAL MEDICINE

## 2025-04-25 RX ADMIN — CARVEDILOL 25 MG: 25 TABLET, FILM COATED ORAL at 13:36

## 2025-04-25 RX ADMIN — HEPARIN SODIUM 1600 UNITS: 1000 INJECTION, SOLUTION INTRAVENOUS; SUBCUTANEOUS at 12:28

## 2025-04-25 RX ADMIN — PANTOPRAZOLE SODIUM 40 MG: 40 TABLET, DELAYED RELEASE ORAL at 13:36

## 2025-04-25 RX ADMIN — Medication 1000 MCG: at 13:36

## 2025-04-25 RX ADMIN — LOSARTAN POTASSIUM 50 MG: 50 TABLET, FILM COATED ORAL at 13:37

## 2025-04-25 RX ADMIN — FUROSEMIDE 40 MG: 10 INJECTION, SOLUTION INTRAMUSCULAR; INTRAVENOUS at 13:37

## 2025-04-25 RX ADMIN — PREGABALIN 75 MG: 75 CAPSULE ORAL at 13:37

## 2025-04-25 RX ADMIN — Medication 50 MCG: at 13:37

## 2025-04-25 RX ADMIN — AMLODIPINE BESYLATE 10 MG: 10 TABLET ORAL at 13:36

## 2025-04-25 ASSESSMENT — COGNITIVE AND FUNCTIONAL STATUS - GENERAL
MOVING TO AND FROM BED TO CHAIR: A LITTLE
CLIMB 3 TO 5 STEPS WITH RAILING: A LOT
MOBILITY SCORE: 17
HELP NEEDED FOR BATHING: A LOT
DRESSING REGULAR LOWER BODY CLOTHING: A LOT
STANDING UP FROM CHAIR USING ARMS: A LITTLE
TURNING FROM BACK TO SIDE WHILE IN FLAT BAD: A LITTLE
TOILETING: A LITTLE
DRESSING REGULAR UPPER BODY CLOTHING: A LITTLE
DAILY ACTIVITIY SCORE: 17
PERSONAL GROOMING: A LITTLE
WALKING IN HOSPITAL ROOM: A LOT

## 2025-04-25 ASSESSMENT — PAIN SCALES - GENERAL
PAINLEVEL_OUTOF10: 0 - NO PAIN
PAINLEVEL_OUTOF10: 0 - NO PAIN

## 2025-04-25 ASSESSMENT — PAIN - FUNCTIONAL ASSESSMENT
PAIN_FUNCTIONAL_ASSESSMENT: NO/DENIES PAIN
PAIN_FUNCTIONAL_ASSESSMENT: 0-10

## 2025-04-25 NOTE — PROGRESS NOTES
"      Nephrology Progress Note      Nephrology following for ESRD.   Events over night:   Patient is doing well, has been weaned off oxygen.  Seen after dialysis still in the dialysis room.  The patient's blood pressure did drop and UF goal was decreased,  UF 1 L  No new labs    /68   Pulse 60   Temp 36 °C (96.8 °F) (Temporal)   Resp 18   Ht 1.549 m (5' 1\")   Wt 71.3 kg (157 lb 3 oz)   SpO2 92%   BMI 29.70 kg/m²     Input / Output:  24 HR:   Intake/Output Summary (Last 24 hours) at 4/25/2025 1242  Last data filed at 4/25/2025 1211  Gross per 24 hour   Intake 1440 ml   Output 1953 ml   Net -513 ml       Physical Exam   Alert and oriented x 3 NAD  Neck: no JVD  CV: RRR  Lungs: CTA bilaterally  Abd: soft, NT, ND   Ext: no lower extremity edema    Scheduled medications  Scheduled Medications[1]  Continuous medications  Continuous Medications[2]  PRN medications  PRN Medications[3]   Results from last 7 days   Lab Units 04/24/25  0413 04/23/25  0325   SODIUM mmol/L 137 139   POTASSIUM mmol/L 4.1 3.3*   CHLORIDE mmol/L 100 101   CO2 mmol/L 30 32   BUN mg/dL 16 23   CREATININE mg/dL 2.27* 2.58*   CALCIUM mg/dL 8.7 8.4*   PROTEIN TOTAL g/dL  --  5.5*   BILIRUBIN TOTAL mg/dL  --  1.0   ALK PHOS U/L  --  61   ALT U/L  --  11   AST U/L  --  12   GLUCOSE mg/dL 105* 133*      Results from last 7 days   Lab Units 04/23/25  0325   MAGNESIUM mg/dL 1.78      Results from last 7 days   Lab Units 04/24/25  0413 04/23/25  1307 04/23/25  0325 04/22/25  1538   WBC AUTO x10*3/uL 3.9*  --  3.9* 6.1   HEMOGLOBIN g/dL 7.8* 9.1* 6.2* 7.5*   HEMATOCRIT % 24.3* 28.6* 19.7* 23.5*   PLATELETS AUTO x10*3/uL 175  --  177 200        Assessment & Plan:   Patient is 80 y.o. female who is admitted to hospital for acute on chronic HFpEF. Nephrology consulted in view of ESRD.     ESRD  -HD [facility] on Monday Wednesday Friday  -Access is right IJ TDC  -Patient is compliant with dialysis treatment     Anemia of ESRD and MDS  -Requires frequent " PRBCs transfusion and is on high-dose MARILYNN with dialysis  -Transfuse when symptomatic or when hemoglobin less than 7     CKD-MBD  -Patient not currently on a phosphorus binder  -Calcium is normal  DM2  HTN        Recommendations:   -Continue losartan and carvedilol  - Patient is at dry weight now  -volume primarily managed through dialysis  - Continue to challenge and reestablish dry weight if needed  -Okay for discharge from renal view.     Please message me through [employer] chat with any questions or concerns.     Pinky Christie DO  4/25/2025  12:42 PM     [facility]    [address] [place] [address], [address]   [place] [address]  Office: [phone]       [1] amLODIPine, 10 mg, oral, Daily  atorvastatin, 10 mg, oral, Nightly  carvedilol, 25 mg, oral, BID  cholecalciferol, 50 mcg, oral, Daily  cyanocobalamin, 1,000 mcg, oral, Daily  furosemide, 40 mg, intravenous, Daily  heparin, 2,000 Units, intra-catheter, After Dialysis  heparin, 2,000 Units, intra-catheter, After Dialysis  insulin lispro, 0-10 Units, subcutaneous, TID AC  losartan, 50 mg, oral, Daily  pantoprazole, 40 mg, oral, Daily  polyethylene glycol, 17 g, oral, Daily  pregabalin, 75 mg, oral, Daily     [2]    [3] PRN medications: acetaminophen, bisacodyl, cyclobenzaprine, dextrose, dextrose, glucagon, glucagon, guaiFENesin, hydrALAZINE, melatonin, ondansetron ODT **OR** ondansetron, ondansetron **OR** ondansetron

## 2025-04-25 NOTE — PROGRESS NOTES
Occupational Therapy                 Therapy Communication Note    Patient Name: Tana Hargrove  MRN: [id number]  Department: [facility] 2 W  Room: 2012/2012-A  Today's Date: 4/25/2025     Discipline: Occupational Therapy    OT Missed Visit: Yes     Missed Visit Reason: Missed Visit Reason: Patient refused (Patient declined OT session.)    Missed Time: Attempt    Comment:

## 2025-04-25 NOTE — DISCHARGE SUMMARY
Discharge Diagnosis  Hypoxemia requiring supplemental oxygen   End-stage renal disease on hemodialysis  Acute on chronic diastolic heart failure  Uncontrolled hypertension  Acute pulmonary edema        Issues Requiring Follow-Up      Discharge Meds     Medication List      CONTINUE taking these medications     allopurinol 100 mg tablet; Commonly known as: Zyloprim   amLODIPine 10 mg tablet; Commonly known as: Norvasc   atorvastatin 10 mg tablet; Commonly known as: Lipitor; Take 1 tablet (10   mg) by mouth once daily.   carvedilol 25 mg tablet; Commonly known as: Coreg; Take 1 tablet (25 mg)   by mouth 2 times a day.   cholecalciferol 50 mcg (2,000 units) tablet; Commonly known as: Vitamin   D-3   cyanocobalamin 1,000 mcg tablet; Commonly known as: Vitamin B-12   losartan 50 mg tablet; Commonly known as: Cozaar; Take 1 tablet (50 mg)   by mouth once daily.   pantoprazole 40 mg EC tablet; Commonly known as: ProtoNix; Take 1 tablet   (40 mg) by mouth once daily in the morning. Take before meals. Do not   crush, chew, or split.   pioglitazone 15 mg tablet; Commonly known as: Actos; Take 1 tablet (15   mg) by mouth once daily.   pregabalin 100 mg capsule; Commonly known as: Lyrica; Take 1 capsule   (100 mg) by mouth 2 times a day.       Test Results Pending At Discharge  Pending Labs       Order Current Status    Staphylococcus Aureus/MRSA Colonization, Culture In process    Urine Culture In process            Hospital Course  80-year-old female whose past medical history is remarkable for hypertension, hyperlipidemia, DM2, anemia of CKD 5, end-stage renal disease on hemodialysis MWF via right IJ tunneled permacath.  She denies cigarette smoking, alcohol abuse or illicit drug use.  She is usually not on home O2.  She last got dialyzed yesterday morning.  Patient reports mild dyspnea last night when she laid down to sleep without any associated chest pain, palpitations, lightheadedness, near syncope, cough, fever, chills,  malaise, nausea, vomiting, diarrhea, headache, skin rash, or neck stiffness.  Shortness of breath became severe today hence her current ED presentation.  Her vitals upon ED arrival included /84, HR 93, temperature 99.5 °F, pulse ox down to upper 80s on room air which improved with supplemental O2 currently at 2 L/min.  Patient admits she forgot to take her blood pressure medicines this morning.  Pertinent ED labs included serum sodium 138, potassium 3.5, bicarb 31, BUN 21, creatinine 2.4, glucose 169, WBC 6.1, hemoglobin 7.5, platelets 200.  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  Patient received the ED 20 mg IV labetalol followed by 5 mg IV hydralazine with improved BP.  She remains on 2 L/min supplemental O2.  Her hospitalization for further management has been sought.   4/23:Patient seen and examined.  Awake/alert/oriented.  Feels overall improved from yesterday.  Denies shortness of breath or chest pain.  She is having a headache and muscle cramps.  She did have dialysis today.  No nausea, vomiting, or abdominal pain.  4/24: Patient was seen and examined.  -1000 mL since admission.  She is currently saturating well on room air.  MRI was done today and results are still pending.  Patient is scheduled for hemodialysis tomorrow.  Potential discharge home with [facility] within the next 24 to 48 hours.  4/25: Patient was seen and examined.  Continues to saturate well on room air.  Completed hemodialysis successfully today.  MRI completed showed 11 mm solid mass in the left kidney worrisome for renal cell carcinoma.   I have added a urology referral for patient to have this evaluated outpatient.  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  She was discharged in stable condition to follow-up as outpatient with primary care physician within 1 week of discharge.  Pertinent Physical Exam At Time of Discharge  Physical Exam  Vitals:    04/25/25 1238   BP: 161/68   Pulse: 62   Resp: 18   Temp: 37.1 °C (98.7 °F)   SpO2: 92%     Constitutional:       Appearance: Normal appearance.   HENT:      Head: Normocephalic and atraumatic.   Eyes:      Extraocular Movements: Extraocular movements intact.      Conjunctiva/sclera: Conjunctivae normal.   Cardiovascular:      Rate and Rhythm: Normal rate and regular rhythm.   Pulmonary:      Effort: Pulmonary effort is normal.      Breath sounds: No wheezing, rhonchi or rales.      Comments: Breath sounds clear, diminished bilaterally.    Abdominal:      General: Bowel sounds are normal.      Palpations: Abdomen is soft.      Tenderness: There is no abdominal tenderness.   Skin:     General: Skin is warm and dry.   Neurological:      General: No focal deficit present.      Mental Status: She is alert and oriented to person, place, and time.      Outpatient Follow-Up  Future Appointments   Date Time Provider Department Center   5/1/2025 11:30 AM POR  INFUSION [id number] [facility]   6/16/2025 11:30 AM Azeem Medina MD [id number] [facility]   7/1/2025 10:00 AM Carlos Higgins MD [id number] [facility]   9/30/2025 11:00 AM Humphrey Callaway DO [id number] [facility]   11/13/2025 10:00 AM Carlos Higgins MD [id number] [facility]         Naga Carr MD

## 2025-04-25 NOTE — PRE-PROCEDURE NOTE
Report from Sending RN:    Report From: Tori Latif RN  Recent Surgery of Procedure: No  Baseline Level of Consciousness (LOC): x4  Oxygen Use: No  Type: RA  Diabetic: Yes  Last BP Med Given Day of Dialysis: See MAR  Last Pain Med Given: See MAR  Lab Tests to be Obtained with Dialysis: Yes  Blood Transfusion to be Given During Dialysis: No  Available IV Access: Yes  Medications to be Administered During Dialysis: No  Continuous IV Infusion Running: No  Restraints on Currently or in the Last 24 Hours: No  Hand-Off Communication: Pt stable &  ready for dialysis  Dialysis Catheter Dressing: Will check prior to dialysis

## 2025-04-25 NOTE — PROGRESS NOTES
Physical Therapy                 Therapy Communication Note    Patient Name: Tana Hargrove  MRN: [id number]  Department: [facility]  DIALYSIS  Room: 2012/2012-A  Today's Date: 4/25/2025     Discipline: Physical Therapy    PT Missed Visit: Yes     Missed Visit Reason: Patient in a medical procedure    Missed Time: Attempt    Comment: Checked in on patient earlier in the morning and she was eating her breakfast. Went back to check on patient a second time and she was at Dialysis.     Gianna D'Amico, S-PTA

## 2025-04-25 NOTE — CARE PLAN
The patient's goals for the shift include      The clinical goals for the shift include remain free of falls through shift

## 2025-04-25 NOTE — PROGRESS NOTES
Occupational Therapy                 Therapy Communication Note    Patient Name: Tana Hargrove  MRN: [id number]  Department: [facility] DIALYSIS  Room: 2012/2012-A  Today's Date: 4/25/2025     Discipline: Occupational Therapy          Missed Visit Reason: Missed Visit Reason: Patient in a medical procedure (Dialysis)    Missed Time: Attempt    Comment:

## 2025-04-25 NOTE — PROGRESS NOTES
Spoke to patient whom confirms she plans to return home with resumption of [facility]. She denies any other home going needs at this time. [facility] will continue to follow for needs if they arise.

## 2025-04-25 NOTE — POST-PROCEDURE NOTE
Report to Receiving RN:    Report To: April Linker RN  Time Report Called: [id number]  Hand-Off Communication:   Pt didn't tolerate prescribed 2L.  Removed 1L  Post vitals: 1158/73 (60) - 96.8*F - 69.5 kg   Complications During Treatment: No  Ultrafiltration Treatment: No  Medications Administered During Dialysis: No  Blood Products Administered During Dialysis: No  Labs Sent During Dialysis: No  Heparin Drip Rate Changes: N/A  Dialysis Catheter Dressing: clean, dry, intact  Last Dressing Change: TE 4/23/2025    Electronic Signatures:  Macey Waller OCDT    Last Updated: 10:28 AM by MACEY WALLER

## 2025-04-29 ENCOUNTER — LAB (OUTPATIENT)
Dept: LAB | Facility: HOSPITAL | Age: 80
End: 2025-04-29
Payer: MEDICARE

## 2025-04-29 ENCOUNTER — TELEPHONE (OUTPATIENT)
Dept: PRIMARY CARE | Facility: CLINIC | Age: 80
End: 2025-04-29

## 2025-04-29 ENCOUNTER — PATIENT OUTREACH (OUTPATIENT)
Dept: PRIMARY CARE | Facility: CLINIC | Age: 80
End: 2025-04-29
Payer: MEDICARE

## 2025-04-29 DIAGNOSIS — D50.9 IRON DEFICIENCY ANEMIA, UNSPECIFIED IRON DEFICIENCY ANEMIA TYPE: ICD-10-CM

## 2025-04-29 DIAGNOSIS — D46.9 MYELODYSPLASTIC SYNDROME (MULTI): ICD-10-CM

## 2025-04-29 DIAGNOSIS — D64.9 ANEMIA, UNSPECIFIED TYPE: ICD-10-CM

## 2025-04-29 LAB
ABO GROUP (TYPE) IN BLOOD: NORMAL
ANTIBODY SCREEN: NORMAL
BACTERIA UR CULT: ABNORMAL
ERYTHROCYTE [DISTWIDTH] IN BLOOD BY AUTOMATED COUNT: 18.8 % (ref 11.5–14.5)
HCT VFR BLD AUTO: 25 % (ref 36–46)
HGB BLD-MCNC: 7.8 G/DL (ref 12–16)
MCH RBC QN AUTO: 29.8 PG (ref 26–34)
MCHC RBC AUTO-ENTMCNC: 31.2 G/DL (ref 32–36)
MCV RBC AUTO: 95 FL (ref 80–100)
NRBC BLD-RTO: 0 /100 WBCS (ref 0–0)
PLATELET # BLD AUTO: 172 X10*3/UL (ref 150–450)
RBC # BLD AUTO: 2.62 X10*6/UL (ref 4–5.2)
RH FACTOR (ANTIGEN D): NORMAL
WBC # BLD AUTO: 4.6 X10*3/UL (ref 4.4–11.3)

## 2025-04-29 PROCEDURE — 86747 PARVOVIRUS ANTIBODY: CPT

## 2025-04-29 PROCEDURE — 86901 BLOOD TYPING SEROLOGIC RH(D): CPT

## 2025-04-29 PROCEDURE — 85027 COMPLETE CBC AUTOMATED: CPT

## 2025-04-29 PROCEDURE — 36415 COLL VENOUS BLD VENIPUNCTURE: CPT

## 2025-04-29 NOTE — TELEPHONE ENCOUNTER
Patient phoned with left foot and ankle swollen, red, warm and painful with a white hard bump that is very painful.  She is going to consider going to Urgent Care.

## 2025-04-29 NOTE — PROGRESS NOTES
Discharge Facility: [facility]   Discharge Diagnosis: Hypoxemia requiring supplemental oxygen   End-stage renal disease on hemodialysis  Acute on chronic diastolic heart failure  Uncontrolled hypertension  Acute pulmonary edema  Admission Date: 4/22/25  Discharge Date: 4/25/25    PCP Appointment Date: 5/6/25  Specialist Appointment Date: [facility] urology  Hospital Encounter and Summary Linked: Yes  ED to Hosp-Admission (Discharged) with Naga Carr MD; Rc Marinelli DO (04/22/2025)   At least two attempts were made to reach patient within two business days after discharge. If available, left voicemail with contact information for patient to call back with any non-emergent questions or concerns.

## 2025-04-30 ENCOUNTER — TELEPHONE (OUTPATIENT)
Dept: HEMATOLOGY/ONCOLOGY | Facility: CLINIC | Age: 80
End: 2025-04-30
Payer: MEDICARE

## 2025-04-30 NOTE — TELEPHONE ENCOUNTER
Reason for Conversation  results    Background   Notified pt hgb 7.8, no blood transfusion needed this week.     Disposition   No disposition on file.

## 2025-05-01 ENCOUNTER — APPOINTMENT (OUTPATIENT)
Dept: INFUSION THERAPY | Facility: HOSPITAL | Age: 80
End: 2025-05-01
Payer: MEDICARE

## 2025-05-02 LAB
B19V IGG SER IA-ACNC: 1.95 IV
B19V IGM SER IA-ACNC: 0.53 IV

## 2025-05-06 ENCOUNTER — DOCUMENTATION (OUTPATIENT)
Dept: HEMATOLOGY/ONCOLOGY | Facility: HOSPITAL | Age: 80
End: 2025-05-06

## 2025-05-06 ENCOUNTER — APPOINTMENT (OUTPATIENT)
Dept: PRIMARY CARE | Facility: CLINIC | Age: 80
End: 2025-05-06
Payer: MEDICARE

## 2025-05-06 ENCOUNTER — LAB (OUTPATIENT)
Dept: LAB | Facility: HOSPITAL | Age: 80
End: 2025-05-06
Payer: MEDICARE

## 2025-05-06 VITALS
RESPIRATION RATE: 16 BRPM | HEIGHT: 61 IN | SYSTOLIC BLOOD PRESSURE: 160 MMHG | DIASTOLIC BLOOD PRESSURE: 75 MMHG | TEMPERATURE: 96.9 F | BODY MASS INDEX: 29.7 KG/M2 | HEART RATE: 69 BPM | OXYGEN SATURATION: 92 %

## 2025-05-06 DIAGNOSIS — M79.18 MYOFASCIAL PAIN SYNDROME: ICD-10-CM

## 2025-05-06 DIAGNOSIS — I10 HYPERTENSION, ESSENTIAL: Chronic | ICD-10-CM

## 2025-05-06 DIAGNOSIS — Z09 HOSPITAL DISCHARGE FOLLOW-UP: Primary | ICD-10-CM

## 2025-05-06 DIAGNOSIS — E11.621 TYPE 2 DIABETES MELLITUS WITH FOOT ULCER (CODE): ICD-10-CM

## 2025-05-06 DIAGNOSIS — D64.9 ANEMIA, UNSPECIFIED TYPE: ICD-10-CM

## 2025-05-06 DIAGNOSIS — G57.93 NEUROPATHIC PAIN OF BOTH FEET: ICD-10-CM

## 2025-05-06 DIAGNOSIS — E11.9 TYPE 2 DIABETES MELLITUS WITHOUT COMPLICATION, WITHOUT LONG-TERM CURRENT USE OF INSULIN: ICD-10-CM

## 2025-05-06 DIAGNOSIS — N28.89 RENAL MASS, LEFT: ICD-10-CM

## 2025-05-06 DIAGNOSIS — L97.521 NON-PRESSURE CHRONIC ULCER OF OTHER PART OF LEFT FOOT LIMITED TO BREAKDOWN OF SKIN: ICD-10-CM

## 2025-05-06 DIAGNOSIS — D50.9 IRON DEFICIENCY ANEMIA, UNSPECIFIED IRON DEFICIENCY ANEMIA TYPE: ICD-10-CM

## 2025-05-06 LAB
ABO GROUP (TYPE) IN BLOOD: NORMAL
ANTIBODY SCREEN: NORMAL
ERYTHROCYTE [DISTWIDTH] IN BLOOD BY AUTOMATED COUNT: 19 % (ref 11.5–14.5)
EST. AVERAGE GLUCOSE BLD GHB EST-MCNC: 157 MG/DL
HBA1C MFR BLD: 7.1 % (ref ?–5.7)
HCT VFR BLD AUTO: 20.3 % (ref 36–46)
HGB BLD-MCNC: 6.3 G/DL (ref 12–16)
MCH RBC QN AUTO: 30.7 PG (ref 26–34)
MCHC RBC AUTO-ENTMCNC: 31 G/DL (ref 32–36)
MCV RBC AUTO: 99 FL (ref 80–100)
PLATELET # BLD AUTO: 263 X10*3/UL (ref 150–450)
RBC # BLD AUTO: 2.05 X10*6/UL (ref 4–5.2)
RH FACTOR (ANTIGEN D): NORMAL
WBC # BLD AUTO: 4.3 X10*3/UL (ref 4.4–11.3)

## 2025-05-06 PROCEDURE — 83036 HEMOGLOBIN GLYCOSYLATED A1C: CPT | Mod: PORLAB | Performed by: STUDENT IN AN ORGANIZED HEALTH CARE EDUCATION/TRAINING PROGRAM

## 2025-05-06 PROCEDURE — 1160F RVW MEDS BY RX/DR IN RCRD: CPT | Performed by: STUDENT IN AN ORGANIZED HEALTH CARE EDUCATION/TRAINING PROGRAM

## 2025-05-06 PROCEDURE — 86850 RBC ANTIBODY SCREEN: CPT

## 2025-05-06 PROCEDURE — 1159F MED LIST DOCD IN RCRD: CPT | Performed by: STUDENT IN AN ORGANIZED HEALTH CARE EDUCATION/TRAINING PROGRAM

## 2025-05-06 PROCEDURE — 1036F TOBACCO NON-USER: CPT | Performed by: STUDENT IN AN ORGANIZED HEALTH CARE EDUCATION/TRAINING PROGRAM

## 2025-05-06 PROCEDURE — 3077F SYST BP >= 140 MM HG: CPT | Performed by: STUDENT IN AN ORGANIZED HEALTH CARE EDUCATION/TRAINING PROGRAM

## 2025-05-06 PROCEDURE — 99495 TRANSJ CARE MGMT MOD F2F 14D: CPT | Performed by: STUDENT IN AN ORGANIZED HEALTH CARE EDUCATION/TRAINING PROGRAM

## 2025-05-06 PROCEDURE — 3078F DIAST BP <80 MM HG: CPT | Performed by: STUDENT IN AN ORGANIZED HEALTH CARE EDUCATION/TRAINING PROGRAM

## 2025-05-06 PROCEDURE — 36415 COLL VENOUS BLD VENIPUNCTURE: CPT

## 2025-05-06 PROCEDURE — 85027 COMPLETE CBC AUTOMATED: CPT

## 2025-05-06 PROCEDURE — 1111F DSCHRG MED/CURRENT MED MERGE: CPT | Performed by: STUDENT IN AN ORGANIZED HEALTH CARE EDUCATION/TRAINING PROGRAM

## 2025-05-06 PROCEDURE — 86922 COMPATIBILITY TEST ANTIGLOB: CPT

## 2025-05-06 RX ORDER — LOSARTAN POTASSIUM 50 MG/1
50 TABLET ORAL 2 TIMES DAILY
Qty: 180 TABLET | Refills: 1 | Status: SHIPPED | OUTPATIENT
Start: 2025-05-06

## 2025-05-06 RX ORDER — PREGABALIN 100 MG/1
100 CAPSULE ORAL 2 TIMES DAILY
Qty: 60 CAPSULE | Refills: 0 | Status: SHIPPED | OUTPATIENT
Start: 2025-05-06

## 2025-05-06 SDOH — ECONOMIC STABILITY: FOOD INSECURITY: WITHIN THE PAST 12 MONTHS, THE FOOD YOU BOUGHT JUST DIDN'T LAST AND YOU DIDN'T HAVE MONEY TO GET MORE.: NEVER TRUE

## 2025-05-06 SDOH — ECONOMIC STABILITY: FOOD INSECURITY: WITHIN THE PAST 12 MONTHS, YOU WORRIED THAT YOUR FOOD WOULD RUN OUT BEFORE YOU GOT MONEY TO BUY MORE.: NEVER TRUE

## 2025-05-06 ASSESSMENT — ENCOUNTER SYMPTOMS
CONSTIPATION: 0
MUSCULOSKELETAL NEGATIVE: 1
VOMITING: 0
HEADACHES: 0
PALPITATIONS: 0
DIZZINESS: 0
CHILLS: 0
ABDOMINAL PAIN: 0
WHEEZING: 0
SHORTNESS OF BREATH: 0
COLOR CHANGE: 0
FATIGUE: 0
CONFUSION: 0
NAUSEA: 0
COUGH: 0
FEVER: 0
UNEXPECTED WEIGHT CHANGE: 0
DIARRHEA: 0

## 2025-05-06 ASSESSMENT — LIFESTYLE VARIABLES
HOW OFTEN DO YOU HAVE SIX OR MORE DRINKS ON ONE OCCASION: NEVER
SKIP TO QUESTIONS 9-10: 1
HOW MANY STANDARD DRINKS CONTAINING ALCOHOL DO YOU HAVE ON A TYPICAL DAY: PATIENT DOES NOT DRINK
HOW OFTEN DO YOU HAVE A DRINK CONTAINING ALCOHOL: NEVER
AUDIT-C TOTAL SCORE: 0

## 2025-05-06 NOTE — PROGRESS NOTES
"Subjective   Patient ID: Tana Hargrove is a 80 y.o. female who presents for Hospital Follow-up (Admitted 2025 - 2025 Hypoxemia.  Additionally pt thinks she has cellulitis on left foot, went to   and was rx keflex).    Patient: Tana Hargrove  : 1945  PCP: Carlos Higgins MD  MRN: [id number]  Program: Transitional Care Management  Status: Enrolled  Effective Dates: 2025 - present  Responsible Staff: Gerri Boykin RN  Social Drivers to be Addressed: Physical Activity      Tana Hargrove is a 80 y.o. female presenting today for follow-up after being discharged from the hospital 11 days ago. The main problem requiring admission was hypoxemia requiring supplemental O2, ESRD, acute on chronic CHF, uncontrolled HTN & others. The discharge summary and/or Transitional Care Management documentation was reviewed. Medication reconciliation was performed as indicated via the \"[place] as Reviewed\" timestamp.   She was in the hospital from 2025 to 2025 for hypoxemia requiring oxygen, acute chronic CHF, ESRD and other events as above.  Patient was successfully treated and was able to wean off oxygen prior to discharge.  Patient also had MRI showing 11 mm solid mass in the left kidney worrisome for renal cell carcinoma and was advised to see urology for follow-up.  Reports he is doing much better, breathing has improved, no more SOB; denies CP; legs has been fine.     Postdischarge from hospital, she went to  for L leg cellulitis on 25 and was put on Keflex, completed course.  Reports its better but mild redness still there. Reports she was discharged on skilled nursing; she is getting PT and RN from [facility]; reports would like to cont; also receive paper order from [facility].     Tana Hargrove was contacted by Transitional Care Management services two days after her discharge. This encounter and supporting documentation was reviewed.    Review of Systems   Constitutional:  Negative for " "chills, fatigue, fever and unexpected weight change.   HENT: Negative.     Respiratory:  Negative for cough, shortness of breath and wheezing.    Cardiovascular:  Negative for chest pain, palpitations and leg swelling.   Gastrointestinal:  Negative for abdominal pain, constipation, diarrhea, nausea and vomiting.   Musculoskeletal: Negative.    Skin:  Negative for color change and rash.   Neurological:  Negative for dizziness and headaches.   Psychiatric/Behavioral:  Negative for behavioral problems and confusion.        /75 (BP Location: Right arm, Patient Position: Sitting, BP Cuff Size: Adult)   Pulse 69   Temp 36.1 °C (96.9 °F) (Temporal)   Resp 16   Ht 1.549 m (5' 1\")   SpO2 92%   BMI 29.70 kg/m²     Physical Exam  Vitals and nursing note reviewed.   Constitutional:       Appearance: Normal appearance.   Cardiovascular:      Rate and Rhythm: Normal rate and regular rhythm.      Pulses: Normal pulses.      Heart sounds: Normal heart sounds.   Pulmonary:      Effort: Pulmonary effort is normal.      Breath sounds: Normal breath sounds.   Abdominal:      General: Abdomen is flat. Bowel sounds are normal.      Palpations: Abdomen is soft.   Musculoskeletal:         General: Normal range of motion.   Skin:     Comments: Left foot mild redness at the forefoot with mild tenderness.  Swelling has been pretty much gone.   Neurological:      General: No focal deficit present.      Mental Status: She is alert.   Psychiatric:         Mood and Affect: Mood normal.         Behavior: Behavior normal.         The complexity of medical decision making for this patient's transitional care is moderate.    Assessment/Plan   She is here for hospital discharge follow-up.  She is doing much better posthospital discharge and breathing has been improved and back to normal and no longer requiring oxygen.  Advised to continue follow-up with nephro and go for his hemodialysis as usual.  Also place a referral to see urology for " left renal mass finding on MRI.  BP remains elevated, will increase losartan from 50 mg daily to twice daily; continue other med as usual.  Also advised to discuss with nephro at next visit.  Neuropathic pain has been stable on Lyrica, continue same.  OARRS reviewed and appears appropriate.  She will continue to benefit from skilled nursing and PT, new order from [facility] and signed.  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          Type 2 diabetes mellitus with foot ulcer (CODE) E11.621    Stable diabetes, cont same; rec to see podiatrist soon.           Other Visit Diagnoses         Codes      Hospital discharge follow-up    -  Primary Z09      Renal mass, left     N28.89    Relevant Orders    Referral to Urology      Neuropathic pain of both feet     G57.93    Relevant Medications    pregabalin (Lyrica) 100 mg capsule          RTC as scheduled    This note was partially generated using the Dragon Voice recognition system. There may be some incorrect wording, spelling and/or spelling errors or punctuation errors that were not corrected prior to committing the note to the medical record.      MD STEVEN Quintana, Family Medicine

## 2025-05-07 ENCOUNTER — TELEPHONE (OUTPATIENT)
Dept: INFUSION THERAPY | Facility: HOSPITAL | Age: 80
End: 2025-05-07

## 2025-05-07 DIAGNOSIS — D46.9 MYELODYSPLASTIC SYNDROME (MULTI): Primary | ICD-10-CM

## 2025-05-07 RX ORDER — EPINEPHRINE 0.3 MG/.3ML
0.3 INJECTION SUBCUTANEOUS EVERY 5 MIN PRN
OUTPATIENT
Start: 2025-05-08

## 2025-05-07 RX ORDER — FAMOTIDINE 10 MG/ML
20 INJECTION, SOLUTION INTRAVENOUS ONCE AS NEEDED
OUTPATIENT
Start: 2025-05-08

## 2025-05-07 RX ORDER — DIPHENHYDRAMINE HYDROCHLORIDE 50 MG/ML
50 INJECTION, SOLUTION INTRAMUSCULAR; INTRAVENOUS AS NEEDED
OUTPATIENT
Start: 2025-05-08

## 2025-05-07 RX ORDER — ALBUTEROL SULFATE 0.83 MG/ML
3 SOLUTION RESPIRATORY (INHALATION) AS NEEDED
OUTPATIENT
Start: 2025-05-08

## 2025-05-08 ENCOUNTER — INFUSION (OUTPATIENT)
Dept: INFUSION THERAPY | Facility: HOSPITAL | Age: 80
End: 2025-05-08
Payer: MEDICARE

## 2025-05-08 VITALS
RESPIRATION RATE: 18 BRPM | TEMPERATURE: 98.2 F | HEART RATE: 77 BPM | SYSTOLIC BLOOD PRESSURE: 203 MMHG | OXYGEN SATURATION: 93 % | DIASTOLIC BLOOD PRESSURE: 89 MMHG

## 2025-05-08 DIAGNOSIS — D46.9 MYELODYSPLASTIC SYNDROME (MULTI): ICD-10-CM

## 2025-05-08 LAB
BLOOD EXPIRATION DATE: NORMAL
BLOOD EXPIRATION DATE: NORMAL
DISPENSE STATUS: NORMAL
DISPENSE STATUS: NORMAL
PRODUCT BLOOD TYPE: 600
PRODUCT BLOOD TYPE: 600
PRODUCT CODE: NORMAL
PRODUCT CODE: NORMAL
UNIT ABO: NORMAL
UNIT ABO: NORMAL
UNIT NUMBER: NORMAL
UNIT NUMBER: NORMAL
UNIT RH: NORMAL
UNIT RH: NORMAL
UNIT VOLUME: 350
UNIT VOLUME: 350
XM INTEP: NORMAL
XM INTEP: NORMAL

## 2025-05-08 PROCEDURE — 36430 TRANSFUSION BLD/BLD COMPNT: CPT

## 2025-05-08 PROCEDURE — P9040 RBC LEUKOREDUCED IRRADIATED: HCPCS

## 2025-05-08 RX ORDER — HEPARIN 100 UNIT/ML
500 SYRINGE INTRAVENOUS AS NEEDED
OUTPATIENT
Start: 2025-05-08

## 2025-05-08 RX ORDER — FAMOTIDINE 10 MG/ML
20 INJECTION, SOLUTION INTRAVENOUS ONCE AS NEEDED
OUTPATIENT
Start: 2025-05-08

## 2025-05-08 RX ORDER — HEPARIN SODIUM,PORCINE/PF 10 UNIT/ML
50 SYRINGE (ML) INTRAVENOUS AS NEEDED
OUTPATIENT
Start: 2025-05-08

## 2025-05-08 RX ORDER — ALBUTEROL SULFATE 0.83 MG/ML
3 SOLUTION RESPIRATORY (INHALATION) AS NEEDED
OUTPATIENT
Start: 2025-05-08

## 2025-05-08 RX ORDER — EPINEPHRINE 0.3 MG/.3ML
0.3 INJECTION SUBCUTANEOUS EVERY 5 MIN PRN
OUTPATIENT
Start: 2025-05-08

## 2025-05-08 RX ORDER — DIPHENHYDRAMINE HYDROCHLORIDE 50 MG/ML
50 INJECTION, SOLUTION INTRAMUSCULAR; INTRAVENOUS AS NEEDED
OUTPATIENT
Start: 2025-05-08

## 2025-05-08 ASSESSMENT — PAIN SCALES - GENERAL: PAINLEVEL_OUTOF10: 0-NO PAIN

## 2025-05-08 ASSESSMENT — ENCOUNTER SYMPTOMS
LOSS OF SENSATION IN FEET: 1
DEPRESSION: 0
OCCASIONAL FEELINGS OF UNSTEADINESS: 1

## 2025-05-13 ENCOUNTER — APPOINTMENT (OUTPATIENT)
Dept: VASCULAR SURGERY | Facility: HOSPITAL | Age: 80
End: 2025-05-13
Payer: MEDICARE

## 2025-05-15 ENCOUNTER — PATIENT OUTREACH (OUTPATIENT)
Dept: PRIMARY CARE | Facility: CLINIC | Age: 80
End: 2025-05-15
Payer: MEDICARE

## 2025-05-19 ENCOUNTER — TELEPHONE (OUTPATIENT)
Dept: PRIMARY CARE | Facility: CLINIC | Age: 80
End: 2025-05-19
Payer: MEDICARE

## 2025-05-19 NOTE — TELEPHONE ENCOUNTER
Patient call in stating that she had left foot swelling, redness, warmth and pain a couple weeks ago went to Urgent Care and they gave her an antibiotic. It got better but now it is back, she is asking for another antibiotic sent to [facility],

## 2025-05-20 ENCOUNTER — LAB (OUTPATIENT)
Dept: LAB | Facility: HOSPITAL | Age: 80
End: 2025-05-20
Payer: MEDICARE

## 2025-05-20 ENCOUNTER — OFFICE VISIT (OUTPATIENT)
Dept: HEMATOLOGY/ONCOLOGY | Facility: CLINIC | Age: 80
End: 2025-05-20
Payer: MEDICARE

## 2025-05-20 VITALS
BODY MASS INDEX: 30.06 KG/M2 | SYSTOLIC BLOOD PRESSURE: 157 MMHG | HEART RATE: 66 BPM | TEMPERATURE: 98.8 F | HEIGHT: 61 IN | RESPIRATION RATE: 16 BRPM | OXYGEN SATURATION: 95 % | DIASTOLIC BLOOD PRESSURE: 63 MMHG

## 2025-05-20 DIAGNOSIS — D64.9 ANEMIA, UNSPECIFIED TYPE: ICD-10-CM

## 2025-05-20 DIAGNOSIS — D46.9 MYELODYSPLASTIC SYNDROME (MULTI): ICD-10-CM

## 2025-05-20 DIAGNOSIS — N18.9 ANEMIA DUE TO CHRONIC KIDNEY DISEASE, UNSPECIFIED CKD STAGE: ICD-10-CM

## 2025-05-20 DIAGNOSIS — N18.9 ANEMIA DUE TO CHRONIC KIDNEY DISEASE, UNSPECIFIED CKD STAGE: Primary | ICD-10-CM

## 2025-05-20 DIAGNOSIS — D50.9 IRON DEFICIENCY ANEMIA, UNSPECIFIED IRON DEFICIENCY ANEMIA TYPE: ICD-10-CM

## 2025-05-20 DIAGNOSIS — D63.1 ANEMIA DUE TO CHRONIC KIDNEY DISEASE, UNSPECIFIED CKD STAGE: Primary | ICD-10-CM

## 2025-05-20 DIAGNOSIS — D63.1 ANEMIA DUE TO CHRONIC KIDNEY DISEASE, UNSPECIFIED CKD STAGE: ICD-10-CM

## 2025-05-20 PROBLEM — B34.3 PARVOVIRUS B19 INFECTION: Status: ACTIVE | Noted: 2025-05-20

## 2025-05-20 LAB
ABO GROUP (TYPE) IN BLOOD: NORMAL
ANTIBODY SCREEN: NORMAL
ERYTHROCYTE [DISTWIDTH] IN BLOOD BY AUTOMATED COUNT: 18.8 % (ref 11.5–14.5)
HCT VFR BLD AUTO: 22.8 % (ref 36–46)
HGB BLD-MCNC: 7 G/DL (ref 12–16)
MCH RBC QN AUTO: 30 PG (ref 26–34)
MCHC RBC AUTO-ENTMCNC: 30.7 G/DL (ref 32–36)
MCV RBC AUTO: 98 FL (ref 80–100)
PLATELET # BLD AUTO: 196 X10*3/UL (ref 150–450)
RBC # BLD AUTO: 2.33 X10*6/UL (ref 4–5.2)
RH FACTOR (ANTIGEN D): NORMAL
WBC # BLD AUTO: 3.6 X10*3/UL (ref 4.4–11.3)

## 2025-05-20 PROCEDURE — 99213 OFFICE O/P EST LOW 20 MIN: CPT | Performed by: INTERNAL MEDICINE

## 2025-05-20 PROCEDURE — 86901 BLOOD TYPING SEROLOGIC RH(D): CPT

## 2025-05-20 PROCEDURE — 85027 COMPLETE CBC AUTOMATED: CPT

## 2025-05-20 PROCEDURE — 3077F SYST BP >= 140 MM HG: CPT | Performed by: INTERNAL MEDICINE

## 2025-05-20 PROCEDURE — 36415 COLL VENOUS BLD VENIPUNCTURE: CPT

## 2025-05-20 PROCEDURE — 1111F DSCHRG MED/CURRENT MED MERGE: CPT | Performed by: INTERNAL MEDICINE

## 2025-05-20 PROCEDURE — 1126F AMNT PAIN NOTED NONE PRSNT: CPT | Performed by: INTERNAL MEDICINE

## 2025-05-20 PROCEDURE — 3078F DIAST BP <80 MM HG: CPT | Performed by: INTERNAL MEDICINE

## 2025-05-20 RX ORDER — ACETAMINOPHEN 325 MG/1
650 TABLET ORAL ONCE
OUTPATIENT
Start: 2025-06-10

## 2025-05-20 RX ORDER — EPINEPHRINE 0.3 MG/.3ML
0.3 INJECTION SUBCUTANEOUS EVERY 5 MIN PRN
OUTPATIENT
Start: 2025-06-10

## 2025-05-20 RX ORDER — ALBUTEROL SULFATE 0.83 MG/ML
3 SOLUTION RESPIRATORY (INHALATION) AS NEEDED
OUTPATIENT
Start: 2025-06-10

## 2025-05-20 RX ORDER — FAMOTIDINE 10 MG/ML
20 INJECTION, SOLUTION INTRAVENOUS ONCE AS NEEDED
OUTPATIENT
Start: 2025-06-10

## 2025-05-20 RX ORDER — DIPHENHYDRAMINE HCL 25 MG
25 CAPSULE ORAL ONCE
OUTPATIENT
Start: 2025-06-10

## 2025-05-20 RX ORDER — DIPHENHYDRAMINE HYDROCHLORIDE 50 MG/ML
50 INJECTION, SOLUTION INTRAMUSCULAR; INTRAVENOUS AS NEEDED
OUTPATIENT
Start: 2025-06-10

## 2025-05-20 ASSESSMENT — PAIN SCALES - GENERAL: PAINLEVEL_OUTOF10: 0-NO PAIN

## 2025-05-20 NOTE — PROGRESS NOTES
Tana Hargrove  Female, 79 y.o., 1945  MRN: [id number]  Medical problems  #1 myelodysplastic syndrome    Procrit 80,000 unit every week    2.  Chronic anemia due to chronic kidney disease    3.  History of iron deficiency anemia, elevated ferritin level due to frequent RBC transfusion    4.  ESRD on hemodialysis    Interval history  5/20/25  Tana Hargrove presents today for interval follow-up and treatment of anemia secondary MDS and chronic disease.     She has been receiving Procrit 80,000 weekly and tolerating well.  She denies bleeding, nausea vomiting, chest pain, abdominal pain or other new symptoms.   She is complaining of weakness fatigue, today hemoglobin 7.0, parvovirus B19 IgG was 1.95, discussed with patient    Past medical history: Anemia secondary to MDS (with minor component of myelofibrosis) and chronic disease, diagnosed in January 2013. She  has been maintained on weekly injections of Procrit, 80,000 units.. She has required transfusion intermittently. History also significant for diabetes, lumbar spinal stenosis and degenerative disc disease, hypertension, osteoporosis, GERD, hyperlipidemia,  benign breast disease, gout, hysterectomy, BSO, knee replacement surgeries, back surgery, arthritis, foot surgery, diverticulosis, colon polyp and skin cancers.  COVID-19 infection (11/20/20) .  COVID-19 vaccination (Moderna) February 2021.  [facility] spine  surgery March 2021.  Had Shingrix vaccine 9/2020.  Pneumovax 9/2020.  ESTEE, resolving.     Family medical history: Sister had colon cancer at age 50.        Review of Systems:   Review of Systems:    Constitutional: No fever, chills, night sweats.  Increased fatigue.    Head and neck: No headaches or dizziness.  No pain or stiffness   HEENT: No sore throat or sinusitis.  Hearing is normal and eyesight is good.  Cardiac: No chest pain or palpitations  Respiratory: No increased dyspnea, cough, hemoptysis.  Mild BERNAL.  GI: Appetite is good and weight stable.  No  constipation, diarrhea.  No abdominal pain, nausea or vomiting.  Genitourinary: No frequency or urgency.  No polyuria, dysuria.  Musculoskeletal: Chronic low back pain, knee pain, left foot pain.  Endocrine: History of diabetes; no thyroid disease.  Skin: No rash, skin lesions, itching.  Neuromuscular: no fainting or dizziness.  No history of seizure.  Occasional tremor, spasms and weakness in her extremities                 Allergies and Intolerances:       Allergies:         Demerol HCl: Drug, Unknown, Active         codeine: Drug, Unknown, Active         Vicodin: Drug, Unknown, Active         Ultram: Drug, Unknown, Active         Tape: Environment, Unknown, Active     Outpatient Medication Profile:  * Patient Currently Takes Medications as of 17-Jul-2023 15:52 documented in Structured Notes         sodium polystyrene sulfonate 15 g/60 mL  oral and rectal suspension: Last Dose Taken:  , 60 milliliter(s) orally ONCE but may repeat up to          4 times a day until loose stool. , Start Date: 12-Jul-2023         bumetanide 1 mg oral tablet: Last Dose Taken:  , 1 tab(s) orally once  a day, Start Date: 15-Dec-2022         Lyrica 100 mg oral capsule: Last Dose Taken:  , 1 cap(s) orally 2 times  a day          OARRS LF 11-22-22         60/30, Start Date: 20-May-2022         metoprolol tartrate 25 mg oral tablet: Last Dose Taken:  , 1 tab(s) orally  2 times a day, Start Date: 10-Apr-2022         aspirin 81 mg oral tablet: Last Dose Taken:  , 1 tab(s) orally once a day         pantoprazole 40 mg oral delayed release tablet: Last Dose Taken:  , 1  tab(s) orally once a day         amLODIPine 5 mg oral tablet: Last Dose Taken:  , 1 tab(s) orally once  a day         Vitamin D3 50 mcg (2000 intl units) oral tablet: Last Dose Taken:  ,  1 tab(s) orally once a day         Ventolin HFA 90 mcg/inh inhalation aerosol: Last Dose Taken:  , 2 puff(s)  inhaled 4 times a day, As Needed         allopurinol 100 mg oral tablet: Last Dose  Taken:  , 1 tab(s) orally 2  times a day         pravastatin 40 mg oral tablet: Last Dose Taken:  , 1 tab(s) orally once  a day (at bedtime)         Januvia 100 mg oral tablet: Last Dose Taken:  , 1 tab(s) orally once  a day         glimepiride 4 mg oral tablet: Last Dose Taken:  , 1 tab(s) orally once  a day         acetaminophen 500 mg oral tablet: Last Dose Taken:  , 2 tab(s) orally  every 8 hours, As Needed             Medical History:         Anemia: ICD-10: D64.9, Status: Active         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      Social History:   Social Substance History:  ·  Smoking Status never smoker (1)            Vitals and Measurements:   Vitals: Temp: 36.7  HR: 83  RR: 16  BP: 140/66  SPO2%:   94   Measurements: HT(cm): 157.4  WT(kg): NA  BSA: NA   BMI:  NA      Physical Exam:      Constitutional: No acute distress.  Alert and oriented.   Appears chronically ill.   Eyes: PERRL, EOMI, clear sclera.   ENMT: mucous membranes moist, no apparent injury,  no lesions seen   Head/Neck: Unremarkable.  No JVD. Trachea midline.   Respiratory/Thorax: Clear to auscultation. Normal  breath sounds. No wheezes, rales, rhonchi.   Cardiovascular: Regular, rate and rhythm.   Gastrointestinal: Nondistended.  Normal bowel sounds.   Musculoskeletal: ROM intact.  No joint swelling.  Adequate strength. No deformity.   Extremities: Mild ankle edema.   Neurological: Alert and oriented x3. Senses and cranial  nerves grossly intact. No focal motor or sensory deficits noted.   Psychological: Appropriate mood and affect.  Slightly  depressed affect.   Skin: Warm and dry, no rashes, no suspicious lesions.         Lab Results:          5/20/25) 2 wk ago  (5/6/25) 3 wk ago  (4/29/25) 3 wk ago  (4/24/25) 3 wk ago  (4/23/25) 3 wk ago  (4/23/25) 4 wk ago  (4/22/25)    WBC  4.4 - 11.3 x10*3/uL 3.6 Low  4.3 Low  4.6 3.9 Low   3.9 Low  6.1   RBC  4.00 -  5.20 x10*6/uL 2.33 Low  2.05 Low  2.62 Low  2.58 Low   2.07 Low  2.48 Low    Hemoglobin  12.0 - 16.0 g/dL 7.0 Low  6.3 Low Panic  7.8 Low  7.8 Low  9.1 Low  6.2 Low Panic  7.5 Low    Hematocrit  36.0 - 46.0 % 22.8 Low  20.3 Low  25.0 Low  24.3 Low  28.6 Low  19.7 Low  23.5 Low    MCV  80 - 100 fL 98 99 95 94  95 95   MCH  26.0 - 34.0 pg 30.0 30.7 29.8 30.2  30.0 30.2   MCHC  32.0 - 36.0 g/dL 30.7 Low  31.0 Low  31.2 Low  32.1  31.5 Low  31.9 Low    RDW  11.5 - 14.5 % 18.8 High  19.0 High  18.8 High  20.3 High   19.3 High  18.9 High    Platelets  150 - 450 x10*3/uL 196                     Parvovirus B19  IgG 1.95            Assessment and Plan:   Assessment:    1.  Anemia due to myelodysplastic syndrome, renal insufficiency and chronic disease, on Procrit.  She has required transfusions intermittently, especially when hemoglobin  < 7.5.  Hgb today is 6.7. patient is very tired and weak. We scheduled her for a transfusion of 2 units PRBC's tomorrow. She will continue the Procrit 80,000units weekly for now, but I don't think she is responding. She may be at the point of being transfusion  dependent. Ferritin is elevated most likely from frequent transfusions and inflammation. Not sure if she would benefit from chelating agents.     2.  History of hypertension, arthritis, skin cancer, diabetes and other medical problems     3.  Degenerative disease of the spine, knees, hips, etc.      4.  CKD - stage 4, on Procrit as stated above.      5. On July 12, she had potassium of 6.1. K-exalate was ordered by Dr Medina over the weekend. K+ level has improved to 4.8. will keep on a low dose this week and check labs again in 1 week.       Plan, patient has chronic anemia due to myelodysplastic syndrome and chronic renal insufficiency.  Parvovirus B19 IgG is also elevated it could be contributing factor.  I will continue Procrit injection and also try IVIG 0.5 mg/kg weekly x 2-4.  Discussed with the patient possible side effect  discussed patient.  First dose will be started on Shauna 10, 2025    Follow-up after.  Time spent 20 minutes

## 2025-05-20 NOTE — PATIENT INSTRUCTIONS
Lab work to be done Tuesday 5/27/25  Possible blood transfusion Thursday 5/29/25    IVIG infusions 6/10 and 6/17.   Lab work 6/24 to recheck IgG levels.     Follow up with Dr. Medina on 6/26/25.

## 2025-05-21 NOTE — TELEPHONE ENCOUNTER
Spoke to patient she states her left foot is better and she does not need to be seen at this time

## 2025-05-29 ENCOUNTER — APPOINTMENT (OUTPATIENT)
Dept: RADIOLOGY | Facility: HOSPITAL | Age: 80
End: 2025-05-29
Payer: MEDICARE

## 2025-05-29 ENCOUNTER — APPOINTMENT (OUTPATIENT)
Dept: CARDIOLOGY | Facility: HOSPITAL | Age: 80
End: 2025-05-29
Payer: MEDICARE

## 2025-05-29 ENCOUNTER — HOSPITAL ENCOUNTER (EMERGENCY)
Facility: HOSPITAL | Age: 80
Discharge: HOME | End: 2025-05-29
Attending: EMERGENCY MEDICINE
Payer: MEDICARE

## 2025-05-29 ENCOUNTER — APPOINTMENT (OUTPATIENT)
Dept: INFUSION THERAPY | Facility: HOSPITAL | Age: 80
End: 2025-05-29
Payer: MEDICARE

## 2025-05-29 VITALS
SYSTOLIC BLOOD PRESSURE: 179 MMHG | WEIGHT: 150 LBS | HEIGHT: 61 IN | RESPIRATION RATE: 16 BRPM | BODY MASS INDEX: 28.32 KG/M2 | TEMPERATURE: 97.9 F | DIASTOLIC BLOOD PRESSURE: 89 MMHG | OXYGEN SATURATION: 93 % | HEART RATE: 64 BPM

## 2025-05-29 DIAGNOSIS — N18.9 ANEMIA DUE TO CHRONIC KIDNEY DISEASE, UNSPECIFIED CKD STAGE: Primary | ICD-10-CM

## 2025-05-29 DIAGNOSIS — D63.1 ANEMIA DUE TO CHRONIC KIDNEY DISEASE, UNSPECIFIED CKD STAGE: Primary | ICD-10-CM

## 2025-05-29 LAB
ABO GROUP (TYPE) IN BLOOD: NORMAL
ALBUMIN SERPL BCP-MCNC: 3.7 G/DL (ref 3.4–5)
ALP SERPL-CCNC: 72 U/L (ref 33–136)
ALT SERPL W P-5'-P-CCNC: 11 U/L (ref 7–45)
ANION GAP SERPL CALC-SCNC: 10 MMOL/L (ref 10–20)
ANTIBODY SCREEN: NORMAL
AST SERPL W P-5'-P-CCNC: 12 U/L (ref 9–39)
BASOPHILS # BLD AUTO: 0.03 X10*3/UL (ref 0–0.1)
BASOPHILS NFR BLD AUTO: 1 %
BILIRUB SERPL-MCNC: 0.8 MG/DL (ref 0–1.2)
BLOOD EXPIRATION DATE: NORMAL
BNP SERPL-MCNC: 1249 PG/ML (ref 0–99)
BUN SERPL-MCNC: 26 MG/DL (ref 6–23)
CALCIUM SERPL-MCNC: 8.7 MG/DL (ref 8.6–10.3)
CARDIAC TROPONIN I PNL SERPL HS: 13 NG/L (ref 0–13)
CHLORIDE SERPL-SCNC: 100 MMOL/L (ref 98–107)
CO2 SERPL-SCNC: 31 MMOL/L (ref 21–32)
CREAT SERPL-MCNC: 2.05 MG/DL (ref 0.5–1.05)
DISPENSE STATUS: NORMAL
EGFRCR SERPLBLD CKD-EPI 2021: 24 ML/MIN/1.73M*2
EOSINOPHIL # BLD AUTO: 0.13 X10*3/UL (ref 0–0.4)
EOSINOPHIL NFR BLD AUTO: 4.2 %
ERYTHROCYTE [DISTWIDTH] IN BLOOD BY AUTOMATED COUNT: 20.6 % (ref 11.5–14.5)
GLUCOSE SERPL-MCNC: 231 MG/DL (ref 74–99)
HCT VFR BLD AUTO: 20.7 % (ref 36–46)
HGB BLD-MCNC: 6.4 G/DL (ref 12–16)
HYPOCHROMIA BLD QL SMEAR: NORMAL
IMM GRANULOCYTES # BLD AUTO: 0.01 X10*3/UL (ref 0–0.5)
IMM GRANULOCYTES NFR BLD AUTO: 0.3 % (ref 0–0.9)
INR PPP: 1.2 (ref 0.9–1.1)
LYMPHOCYTES # BLD AUTO: 0.85 X10*3/UL (ref 0.8–3)
LYMPHOCYTES NFR BLD AUTO: 27.7 %
MCH RBC QN AUTO: 30.3 PG (ref 26–34)
MCHC RBC AUTO-ENTMCNC: 30.9 G/DL (ref 32–36)
MCV RBC AUTO: 98 FL (ref 80–100)
MONOCYTES # BLD AUTO: 0.3 X10*3/UL (ref 0.05–0.8)
MONOCYTES NFR BLD AUTO: 9.8 %
NEUTROPHILS # BLD AUTO: 1.75 X10*3/UL (ref 1.6–5.5)
NEUTROPHILS NFR BLD AUTO: 57 %
NRBC BLD-RTO: 0 /100 WBCS (ref 0–0)
OVALOCYTES BLD QL SMEAR: NORMAL
PLATELET # BLD AUTO: 168 X10*3/UL (ref 150–450)
POTASSIUM SERPL-SCNC: 3.8 MMOL/L (ref 3.5–5.3)
PRODUCT BLOOD TYPE: 600
PRODUCT CODE: NORMAL
PROT SERPL-MCNC: 6.2 G/DL (ref 6.4–8.2)
PROTHROMBIN TIME: 13 SECONDS (ref 9.8–12.4)
RBC # BLD AUTO: 2.11 X10*6/UL (ref 4–5.2)
RBC MORPH BLD: NORMAL
RH FACTOR (ANTIGEN D): NORMAL
SODIUM SERPL-SCNC: 137 MMOL/L (ref 136–145)
UNIT ABO: NORMAL
UNIT NUMBER: NORMAL
UNIT RH: NORMAL
UNIT VOLUME: 350
WBC # BLD AUTO: 3.1 X10*3/UL (ref 4.4–11.3)
XM INTEP: NORMAL

## 2025-05-29 PROCEDURE — 84484 ASSAY OF TROPONIN QUANT: CPT | Performed by: EMERGENCY MEDICINE

## 2025-05-29 PROCEDURE — 86922 COMPATIBILITY TEST ANTIGLOB: CPT

## 2025-05-29 PROCEDURE — 99285 EMERGENCY DEPT VISIT HI MDM: CPT | Mod: 25

## 2025-05-29 PROCEDURE — P9040 RBC LEUKOREDUCED IRRADIATED: HCPCS

## 2025-05-29 PROCEDURE — 99291 CRITICAL CARE FIRST HOUR: CPT | Performed by: EMERGENCY MEDICINE

## 2025-05-29 PROCEDURE — 71045 X-RAY EXAM CHEST 1 VIEW: CPT

## 2025-05-29 PROCEDURE — 86902 BLOOD TYPE ANTIGEN DONOR EA: CPT

## 2025-05-29 PROCEDURE — 86901 BLOOD TYPING SEROLOGIC RH(D): CPT | Performed by: EMERGENCY MEDICINE

## 2025-05-29 PROCEDURE — 71045 X-RAY EXAM CHEST 1 VIEW: CPT | Mod: FOREIGN READ | Performed by: RADIOLOGY

## 2025-05-29 PROCEDURE — 80053 COMPREHEN METABOLIC PANEL: CPT | Performed by: EMERGENCY MEDICINE

## 2025-05-29 PROCEDURE — 93005 ELECTROCARDIOGRAM TRACING: CPT

## 2025-05-29 PROCEDURE — 83880 ASSAY OF NATRIURETIC PEPTIDE: CPT | Performed by: EMERGENCY MEDICINE

## 2025-05-29 PROCEDURE — 36415 COLL VENOUS BLD VENIPUNCTURE: CPT | Performed by: EMERGENCY MEDICINE

## 2025-05-29 PROCEDURE — 36430 TRANSFUSION BLD/BLD COMPNT: CPT

## 2025-05-29 PROCEDURE — 85025 COMPLETE CBC W/AUTO DIFF WBC: CPT | Performed by: EMERGENCY MEDICINE

## 2025-05-29 PROCEDURE — 85610 PROTHROMBIN TIME: CPT | Performed by: EMERGENCY MEDICINE

## 2025-05-29 ASSESSMENT — PAIN - FUNCTIONAL ASSESSMENT: PAIN_FUNCTIONAL_ASSESSMENT: 0-10

## 2025-05-29 ASSESSMENT — LIFESTYLE VARIABLES
TOTAL SCORE: 0
HAVE PEOPLE ANNOYED YOU BY CRITICIZING YOUR DRINKING: NO
EVER HAD A DRINK FIRST THING IN THE MORNING TO STEADY YOUR NERVES TO GET RID OF A HANGOVER: NO
HAVE YOU EVER FELT YOU SHOULD CUT DOWN ON YOUR DRINKING: NO
EVER FELT BAD OR GUILTY ABOUT YOUR DRINKING: NO

## 2025-05-29 ASSESSMENT — PAIN SCALES - GENERAL
PAINLEVEL_OUTOF10: 0 - NO PAIN
PAINLEVEL_OUTOF10: 0 - NO PAIN

## 2025-05-29 NOTE — PROGRESS NOTES
"Music Therapy Note    Tana Hargrove was referred by ER rounding    Therapy Session  Referral Type: New referral this admission (ER rounds)  Visit Type: New visit  Session Start Time: 1446  Session End Time: 1510  Intervention Delivery: In-person     Pre-assessment  Unable to Assess Reason: Outcomes not assessed         Treatment/Interventions  Areas of Focus: Coping, Emotional support  Music Therapy Interventions: Assessment, Live music listening    Post-assessment  Total Session Time (min): 24 minutes    Narrative  Assessment Detail: Pt. was lying in bed receiving blood transfusion, alert and with appropriate affect  Plan: MT introduced music therapy services for during pts. treatment to assist with coping, emotional support, and relaxation  Intervention: MT led pt. in live music listening using guitar and voice and pts. preferred music (country). MT sang \"[place] the Road Again\", \"Livin' on Love\", and \"[place] by [place]\".  Evaluation: Pt. was cheerful and appreciative of the visit from MT. She chatted about life and family and musicians.  Follow-up: No f/u needed as pt. is in ER    Education Documentation  Coping Strategies, taught by Lisa Valderrama at 5/29/2025  4:56 PM.  Learner: Patient  Readiness: Acceptance  Method: Explanation  Response: Demonstrated Understanding    Relaxation, taught by Lisa Valderrama at 5/29/2025  4:56 PM.  Learner: Patient  Readiness: Acceptance  Method: Explanation  Response: Demonstrated Understanding    Focal Points, taught by Lisa Valderrama at 5/29/2025  4:56 PM.  Learner: Patient  Readiness: Acceptance  Method: Explanation  Response: Demonstrated Understanding            "

## 2025-06-01 LAB
ATRIAL RATE: 64 BPM
P AXIS: 50 DEGREES
P OFFSET: 191 MS
P ONSET: 133 MS
PR INTERVAL: 172 MS
Q ONSET: 219 MS
QRS COUNT: 11 BEATS
QRS DURATION: 80 MS
QT INTERVAL: 486 MS
QTC CALCULATION(BAZETT): 501 MS
QTC FREDERICIA: 496 MS
R AXIS: 29 DEGREES
T AXIS: 32 DEGREES
T OFFSET: 462 MS
VENTRICULAR RATE: 64 BPM

## 2025-06-03 ENCOUNTER — TELEPHONE (OUTPATIENT)
Dept: PRIMARY CARE | Facility: CLINIC | Age: 80
End: 2025-06-03
Payer: MEDICARE

## 2025-06-03 ENCOUNTER — LAB (OUTPATIENT)
Dept: LAB | Facility: HOSPITAL | Age: 80
End: 2025-06-03
Payer: MEDICARE

## 2025-06-03 DIAGNOSIS — D50.9 IRON DEFICIENCY ANEMIA, UNSPECIFIED IRON DEFICIENCY ANEMIA TYPE: ICD-10-CM

## 2025-06-03 DIAGNOSIS — D63.1 ANEMIA DUE TO CHRONIC KIDNEY DISEASE, UNSPECIFIED CKD STAGE: ICD-10-CM

## 2025-06-03 DIAGNOSIS — N18.9 ANEMIA DUE TO CHRONIC KIDNEY DISEASE, UNSPECIFIED CKD STAGE: ICD-10-CM

## 2025-06-03 DIAGNOSIS — D46.9 MYELODYSPLASTIC SYNDROME (MULTI): ICD-10-CM

## 2025-06-03 LAB
ABO GROUP (TYPE) IN BLOOD: NORMAL
ANTIBODY SCREEN: NORMAL
BASOPHILS # BLD AUTO: 0.05 X10*3/UL (ref 0–0.1)
BASOPHILS NFR BLD AUTO: 1.6 %
EOSINOPHIL # BLD AUTO: 0.12 X10*3/UL (ref 0–0.4)
EOSINOPHIL NFR BLD AUTO: 3.8 %
ERYTHROCYTE [DISTWIDTH] IN BLOOD BY AUTOMATED COUNT: 20 % (ref 11.5–14.5)
HCT VFR BLD AUTO: 21.6 % (ref 36–46)
HGB BLD-MCNC: 6.7 G/DL (ref 12–16)
HYPOCHROMIA BLD QL SMEAR: NORMAL
IMM GRANULOCYTES # BLD AUTO: 0 X10*3/UL (ref 0–0.5)
IMM GRANULOCYTES NFR BLD AUTO: 0 % (ref 0–0.9)
LYMPHOCYTES # BLD AUTO: 1.08 X10*3/UL (ref 0.8–3)
LYMPHOCYTES NFR BLD AUTO: 34.1 %
MCH RBC QN AUTO: 30.5 PG (ref 26–34)
MCHC RBC AUTO-ENTMCNC: 31 G/DL (ref 32–36)
MCV RBC AUTO: 98 FL (ref 80–100)
MONOCYTES # BLD AUTO: 0.32 X10*3/UL (ref 0.05–0.8)
MONOCYTES NFR BLD AUTO: 10.1 %
NEUTROPHILS # BLD AUTO: 1.6 X10*3/UL (ref 1.6–5.5)
NEUTROPHILS NFR BLD AUTO: 50.4 %
NRBC BLD-RTO: ABNORMAL /100{WBCS}
OVALOCYTES BLD QL SMEAR: NORMAL
PLATELET # BLD AUTO: 178 X10*3/UL (ref 150–450)
RBC # BLD AUTO: 2.2 X10*6/UL (ref 4–5.2)
RBC MORPH BLD: NORMAL
RH FACTOR (ANTIGEN D): NORMAL
WBC # BLD AUTO: 3.2 X10*3/UL (ref 4.4–11.3)

## 2025-06-03 PROCEDURE — 36415 COLL VENOUS BLD VENIPUNCTURE: CPT

## 2025-06-03 PROCEDURE — 85025 COMPLETE CBC W/AUTO DIFF WBC: CPT

## 2025-06-03 PROCEDURE — 86901 BLOOD TYPING SEROLOGIC RH(D): CPT

## 2025-06-03 PROCEDURE — 86922 COMPATIBILITY TEST ANTIGLOB: CPT

## 2025-06-03 NOTE — TELEPHONE ENCOUNTER
Called patient, she is aware of form and would like us to proceed.  Pt will be in the building 6/4 and will stop in and sign the release.  Form is in file cabinet at the .

## 2025-06-03 NOTE — TELEPHONE ENCOUNTER
Maria Isabel with [employer] office called to inform provider that patient has requested to terminate her lease and obtain a residence with [facility]. There is a two page document she will be sending over for provider to review and sign off on for the office to submit to their legal team in order to release patient for the obligation of her lease terms. Informed Maria Isabel that there is a two week turn around for any and all paperwork/forms. She requested it be sent to the provider as high priority. Stated patient is in need of 24/7 care they can not provide and it's no longer safe for her well being and can not be accommodated. Patient requested to be released to move out immediately or as soon as possible.     Will send request as high priority once received.

## 2025-06-03 NOTE — TELEPHONE ENCOUNTER
Form was placed on MA's desk, however the release of information area of the form is not filled out by Tana Hargrove.  There is no phone number for Maria Isabel at [facility] listed, called phone number on encounter ([phone]) it is  in [place].

## 2025-06-04 RX ORDER — FAMOTIDINE 10 MG/ML
20 INJECTION, SOLUTION INTRAVENOUS ONCE AS NEEDED
OUTPATIENT
Start: 2025-06-04

## 2025-06-04 RX ORDER — EPINEPHRINE 0.3 MG/.3ML
0.3 INJECTION SUBCUTANEOUS EVERY 5 MIN PRN
OUTPATIENT
Start: 2025-06-04

## 2025-06-04 RX ORDER — DIPHENHYDRAMINE HYDROCHLORIDE 50 MG/ML
50 INJECTION, SOLUTION INTRAMUSCULAR; INTRAVENOUS AS NEEDED
OUTPATIENT
Start: 2025-06-04

## 2025-06-04 RX ORDER — ALBUTEROL SULFATE 0.83 MG/ML
3 SOLUTION RESPIRATORY (INHALATION) AS NEEDED
OUTPATIENT
Start: 2025-06-04

## 2025-06-05 ENCOUNTER — APPOINTMENT (OUTPATIENT)
Dept: INFUSION THERAPY | Facility: HOSPITAL | Age: 80
End: 2025-06-05
Payer: MEDICARE

## 2025-06-05 ENCOUNTER — INFUSION (OUTPATIENT)
Dept: INFUSION THERAPY | Facility: HOSPITAL | Age: 80
End: 2025-06-05
Payer: MEDICARE

## 2025-06-05 VITALS
DIASTOLIC BLOOD PRESSURE: 66 MMHG | SYSTOLIC BLOOD PRESSURE: 144 MMHG | HEART RATE: 60 BPM | RESPIRATION RATE: 18 BRPM | OXYGEN SATURATION: 92 % | TEMPERATURE: 98 F

## 2025-06-05 DIAGNOSIS — D46.9 MYELODYSPLASTIC SYNDROME (MULTI): Primary | ICD-10-CM

## 2025-06-05 PROCEDURE — P9040 RBC LEUKOREDUCED IRRADIATED: HCPCS

## 2025-06-05 PROCEDURE — 86902 BLOOD TYPE ANTIGEN DONOR EA: CPT

## 2025-06-05 PROCEDURE — 36430 TRANSFUSION BLD/BLD COMPNT: CPT

## 2025-06-05 RX ORDER — ALBUTEROL SULFATE 0.83 MG/ML
3 SOLUTION RESPIRATORY (INHALATION) AS NEEDED
OUTPATIENT
Start: 2025-06-05

## 2025-06-05 RX ORDER — FAMOTIDINE 10 MG/ML
20 INJECTION, SOLUTION INTRAVENOUS ONCE AS NEEDED
OUTPATIENT
Start: 2025-06-05

## 2025-06-05 RX ORDER — EPINEPHRINE 0.3 MG/.3ML
0.3 INJECTION SUBCUTANEOUS EVERY 5 MIN PRN
OUTPATIENT
Start: 2025-06-05

## 2025-06-05 RX ORDER — HEPARIN 100 UNIT/ML
500 SYRINGE INTRAVENOUS AS NEEDED
OUTPATIENT
Start: 2025-06-05

## 2025-06-05 RX ORDER — DIPHENHYDRAMINE HYDROCHLORIDE 50 MG/ML
50 INJECTION, SOLUTION INTRAMUSCULAR; INTRAVENOUS AS NEEDED
OUTPATIENT
Start: 2025-06-05

## 2025-06-05 RX ORDER — HEPARIN SODIUM,PORCINE/PF 10 UNIT/ML
50 SYRINGE (ML) INTRAVENOUS AS NEEDED
OUTPATIENT
Start: 2025-06-05

## 2025-06-05 ASSESSMENT — ENCOUNTER SYMPTOMS
DEPRESSION: 0
LOSS OF SENSATION IN FEET: 0
OCCASIONAL FEELINGS OF UNSTEADINESS: 1

## 2025-06-10 ENCOUNTER — APPOINTMENT (OUTPATIENT)
Dept: HEMATOLOGY/ONCOLOGY | Facility: CLINIC | Age: 80
End: 2025-06-10
Payer: MEDICARE

## 2025-06-10 ENCOUNTER — INFUSION (OUTPATIENT)
Dept: HEMATOLOGY/ONCOLOGY | Facility: CLINIC | Age: 80
End: 2025-06-10
Payer: MEDICARE

## 2025-06-10 VITALS
HEIGHT: 61 IN | SYSTOLIC BLOOD PRESSURE: 154 MMHG | RESPIRATION RATE: 16 BRPM | OXYGEN SATURATION: 92 % | TEMPERATURE: 97.9 F | WEIGHT: 158.3 LBS | DIASTOLIC BLOOD PRESSURE: 77 MMHG | HEART RATE: 67 BPM | BODY MASS INDEX: 29.89 KG/M2

## 2025-06-10 DIAGNOSIS — N18.9 ANEMIA DUE TO CHRONIC KIDNEY DISEASE, UNSPECIFIED CKD STAGE: ICD-10-CM

## 2025-06-10 DIAGNOSIS — D46.9 MYELODYSPLASTIC SYNDROME (MULTI): ICD-10-CM

## 2025-06-10 DIAGNOSIS — D63.1 ANEMIA DUE TO CHRONIC KIDNEY DISEASE, UNSPECIFIED CKD STAGE: ICD-10-CM

## 2025-06-10 DIAGNOSIS — D50.9 IRON DEFICIENCY ANEMIA, UNSPECIFIED IRON DEFICIENCY ANEMIA TYPE: ICD-10-CM

## 2025-06-10 DIAGNOSIS — B34.3 PARVOVIRUS B19 INFECTION: ICD-10-CM

## 2025-06-10 LAB
ABO GROUP (TYPE) IN BLOOD: NORMAL
ANTIBODY SCREEN: NORMAL
BASOPHILS # BLD AUTO: 0.04 X10*3/UL (ref 0–0.1)
BASOPHILS NFR BLD AUTO: 1.1 %
EOSINOPHIL # BLD AUTO: 0.02 X10*3/UL (ref 0–0.4)
EOSINOPHIL NFR BLD AUTO: 0.6 %
ERYTHROCYTE [DISTWIDTH] IN BLOOD BY AUTOMATED COUNT: 19.7 % (ref 11.5–14.5)
HCT VFR BLD AUTO: 23.2 % (ref 36–46)
HGB BLD-MCNC: 7.3 G/DL (ref 12–16)
IMM GRANULOCYTES # BLD AUTO: 0.01 X10*3/UL (ref 0–0.5)
IMM GRANULOCYTES NFR BLD AUTO: 0.3 % (ref 0–0.9)
LYMPHOCYTES # BLD AUTO: 1.1 X10*3/UL (ref 0.8–3)
LYMPHOCYTES NFR BLD AUTO: 30.5 %
MCH RBC QN AUTO: 30.8 PG (ref 26–34)
MCHC RBC AUTO-ENTMCNC: 31.5 G/DL (ref 32–36)
MCV RBC AUTO: 98 FL (ref 80–100)
MONOCYTES # BLD AUTO: 0.33 X10*3/UL (ref 0.05–0.8)
MONOCYTES NFR BLD AUTO: 9.1 %
NEUTROPHILS # BLD AUTO: 2.11 X10*3/UL (ref 1.6–5.5)
NEUTROPHILS NFR BLD AUTO: 58.4 %
PLATELET # BLD AUTO: 169 X10*3/UL (ref 150–450)
RBC # BLD AUTO: 2.37 X10*6/UL (ref 4–5.2)
RH FACTOR (ANTIGEN D): NORMAL
WBC # BLD AUTO: 3.6 X10*3/UL (ref 4.4–11.3)

## 2025-06-10 PROCEDURE — 96366 THER/PROPH/DIAG IV INF ADDON: CPT | Mod: INF

## 2025-06-10 PROCEDURE — 86922 COMPATIBILITY TEST ANTIGLOB: CPT

## 2025-06-10 PROCEDURE — 86901 BLOOD TYPING SEROLOGIC RH(D): CPT

## 2025-06-10 PROCEDURE — 96365 THER/PROPH/DIAG IV INF INIT: CPT | Mod: INF

## 2025-06-10 PROCEDURE — 2500000001 HC RX 250 WO HCPCS SELF ADMINISTERED DRUGS (ALT 637 FOR MEDICARE OP): Performed by: INTERNAL MEDICINE

## 2025-06-10 PROCEDURE — 2500000004 HC RX 250 GENERAL PHARMACY W/ HCPCS (ALT 636 FOR OP/ED): Mod: JZ,TB | Performed by: INTERNAL MEDICINE

## 2025-06-10 PROCEDURE — 85025 COMPLETE CBC W/AUTO DIFF WBC: CPT

## 2025-06-10 RX ORDER — ACETAMINOPHEN 325 MG/1
650 TABLET ORAL ONCE
Status: COMPLETED | OUTPATIENT
Start: 2025-06-10 | End: 2025-06-10

## 2025-06-10 RX ORDER — FAMOTIDINE 10 MG/ML
20 INJECTION, SOLUTION INTRAVENOUS ONCE AS NEEDED
Status: DISCONTINUED | OUTPATIENT
Start: 2025-06-10 | End: 2025-06-10 | Stop reason: HOSPADM

## 2025-06-10 RX ORDER — EPINEPHRINE 0.3 MG/.3ML
0.3 INJECTION SUBCUTANEOUS EVERY 5 MIN PRN
Status: DISCONTINUED | OUTPATIENT
Start: 2025-06-10 | End: 2025-06-10 | Stop reason: HOSPADM

## 2025-06-10 RX ORDER — DIPHENHYDRAMINE HCL 25 MG
25 CAPSULE ORAL ONCE
OUTPATIENT
Start: 2025-06-17

## 2025-06-10 RX ORDER — DIPHENHYDRAMINE HCL 25 MG
25 CAPSULE ORAL ONCE
Status: COMPLETED | OUTPATIENT
Start: 2025-06-10 | End: 2025-06-10

## 2025-06-10 RX ORDER — EPINEPHRINE 0.3 MG/.3ML
0.3 INJECTION SUBCUTANEOUS EVERY 5 MIN PRN
OUTPATIENT
Start: 2025-06-17

## 2025-06-10 RX ORDER — DIPHENHYDRAMINE HYDROCHLORIDE 50 MG/ML
50 INJECTION, SOLUTION INTRAMUSCULAR; INTRAVENOUS AS NEEDED
Status: DISCONTINUED | OUTPATIENT
Start: 2025-06-10 | End: 2025-06-10 | Stop reason: HOSPADM

## 2025-06-10 RX ORDER — ACETAMINOPHEN 325 MG/1
650 TABLET ORAL ONCE
OUTPATIENT
Start: 2025-06-17

## 2025-06-10 RX ORDER — HEPARIN SODIUM,PORCINE/PF 10 UNIT/ML
50 SYRINGE (ML) INTRAVENOUS AS NEEDED
OUTPATIENT
Start: 2025-06-10

## 2025-06-10 RX ORDER — ALBUTEROL SULFATE 0.83 MG/ML
3 SOLUTION RESPIRATORY (INHALATION) AS NEEDED
OUTPATIENT
Start: 2025-06-17

## 2025-06-10 RX ORDER — FAMOTIDINE 10 MG/ML
20 INJECTION, SOLUTION INTRAVENOUS ONCE AS NEEDED
OUTPATIENT
Start: 2025-06-17

## 2025-06-10 RX ORDER — HEPARIN 100 UNIT/ML
500 SYRINGE INTRAVENOUS AS NEEDED
OUTPATIENT
Start: 2025-06-10

## 2025-06-10 RX ORDER — DIPHENHYDRAMINE HYDROCHLORIDE 50 MG/ML
50 INJECTION, SOLUTION INTRAMUSCULAR; INTRAVENOUS AS NEEDED
OUTPATIENT
Start: 2025-06-17

## 2025-06-10 RX ORDER — ALBUTEROL SULFATE 0.83 MG/ML
3 SOLUTION RESPIRATORY (INHALATION) AS NEEDED
Status: DISCONTINUED | OUTPATIENT
Start: 2025-06-10 | End: 2025-06-10 | Stop reason: HOSPADM

## 2025-06-10 RX ADMIN — DIPHENHYDRAMINE HYDROCHLORIDE 25 MG: 25 CAPSULE ORAL at 13:13

## 2025-06-10 RX ADMIN — ACETAMINOPHEN 650 MG: 325 TABLET ORAL at 13:13

## 2025-06-10 RX ADMIN — IMMUNE GLOBULIN INFUSION (HUMAN) 30 G: 100 INJECTION, SOLUTION INTRAVENOUS; SUBCUTANEOUS at 13:44

## 2025-06-10 ASSESSMENT — PAIN SCALES - GENERAL: PAINLEVEL_OUTOF10: 0-NO PAIN

## 2025-06-10 NOTE — PROGRESS NOTES
IVIG rates    0.5ml/kg/hr: rate=36  volume=18ml  1ml/kg/hr: Rate=72 volume=36ml  2ml/kg/hr: Rate= 144 volume=72ml  4ml/kg/hr: Rate= 288  volume=174ml

## 2025-06-10 NOTE — PROGRESS NOTES
Pt arrived awake, alert, oriented, no apparent distress with unlabored breaths. Pt reports feeling well, despite ongoing fatigue and weakness. Pt here for her first infusion of IVIG, in which she received and tolerated well as of thus far. Pt id however, have a noted increase in blood pressure. Dr Medina notified with assessment at BS, and verbalized ok to continue treating. Pt advised on medication, risks, and side effects. Pt with next appointment set for 6/17/25, AVS given without further questions or concerns.

## 2025-06-11 RX ORDER — FAMOTIDINE 10 MG/ML
20 INJECTION, SOLUTION INTRAVENOUS ONCE AS NEEDED
Status: CANCELLED | OUTPATIENT
Start: 2025-06-11

## 2025-06-11 RX ORDER — DIPHENHYDRAMINE HYDROCHLORIDE 50 MG/ML
50 INJECTION, SOLUTION INTRAMUSCULAR; INTRAVENOUS AS NEEDED
Status: CANCELLED | OUTPATIENT
Start: 2025-06-11

## 2025-06-11 RX ORDER — DIPHENHYDRAMINE HYDROCHLORIDE 50 MG/ML
50 INJECTION, SOLUTION INTRAMUSCULAR; INTRAVENOUS AS NEEDED
Status: DISCONTINUED | OUTPATIENT
Start: 2025-06-11 | End: 2025-06-12 | Stop reason: HOSPADM

## 2025-06-11 RX ORDER — ALBUTEROL SULFATE 0.83 MG/ML
3 SOLUTION RESPIRATORY (INHALATION) AS NEEDED
Status: DISCONTINUED | OUTPATIENT
Start: 2025-06-11 | End: 2025-06-12 | Stop reason: HOSPADM

## 2025-06-11 RX ORDER — EPINEPHRINE 0.3 MG/.3ML
0.3 INJECTION SUBCUTANEOUS EVERY 5 MIN PRN
Status: DISCONTINUED | OUTPATIENT
Start: 2025-06-11 | End: 2025-06-12 | Stop reason: HOSPADM

## 2025-06-11 RX ORDER — EPINEPHRINE 0.3 MG/.3ML
0.3 INJECTION SUBCUTANEOUS EVERY 5 MIN PRN
Status: CANCELLED | OUTPATIENT
Start: 2025-06-11

## 2025-06-11 RX ORDER — FAMOTIDINE 10 MG/ML
20 INJECTION, SOLUTION INTRAVENOUS ONCE AS NEEDED
Status: DISCONTINUED | OUTPATIENT
Start: 2025-06-11 | End: 2025-06-12 | Stop reason: HOSPADM

## 2025-06-11 RX ORDER — ALBUTEROL SULFATE 0.83 MG/ML
3 SOLUTION RESPIRATORY (INHALATION) AS NEEDED
Status: CANCELLED | OUTPATIENT
Start: 2025-06-11

## 2025-06-12 ENCOUNTER — INFUSION (OUTPATIENT)
Dept: INFUSION THERAPY | Facility: HOSPITAL | Age: 80
End: 2025-06-12
Payer: MEDICARE

## 2025-06-12 ENCOUNTER — TELEPHONE (OUTPATIENT)
Dept: PRIMARY CARE | Facility: CLINIC | Age: 80
End: 2025-06-12
Payer: MEDICARE

## 2025-06-12 VITALS
DIASTOLIC BLOOD PRESSURE: 88 MMHG | RESPIRATION RATE: 20 BRPM | OXYGEN SATURATION: 96 % | WEIGHT: 158.6 LBS | BODY MASS INDEX: 30.33 KG/M2 | TEMPERATURE: 97.9 F | SYSTOLIC BLOOD PRESSURE: 186 MMHG | HEART RATE: 74 BPM

## 2025-06-12 DIAGNOSIS — D46.9 MYELODYSPLASTIC SYNDROME (MULTI): ICD-10-CM

## 2025-06-12 PROCEDURE — P9040 RBC LEUKOREDUCED IRRADIATED: HCPCS

## 2025-06-12 PROCEDURE — 86902 BLOOD TYPE ANTIGEN DONOR EA: CPT

## 2025-06-12 PROCEDURE — 36430 TRANSFUSION BLD/BLD COMPNT: CPT

## 2025-06-12 RX ORDER — HEPARIN SODIUM,PORCINE/PF 10 UNIT/ML
50 SYRINGE (ML) INTRAVENOUS AS NEEDED
OUTPATIENT
Start: 2025-06-12

## 2025-06-12 RX ORDER — HEPARIN 100 UNIT/ML
500 SYRINGE INTRAVENOUS AS NEEDED
OUTPATIENT
Start: 2025-06-12

## 2025-06-12 ASSESSMENT — COLUMBIA-SUICIDE SEVERITY RATING SCALE - C-SSRS
2. HAVE YOU ACTUALLY HAD ANY THOUGHTS OF KILLING YOURSELF?: NO
1. IN THE PAST MONTH, HAVE YOU WISHED YOU WERE DEAD OR WISHED YOU COULD GO TO SLEEP AND NOT WAKE UP?: NO

## 2025-06-12 NOTE — TELEPHONE ENCOUNTER
Called patient and her son and daughter. I left VM messages for them to return call. Dr. Higgins wants to confirm what medications she is currently taking for her blood pressure. Please verify or have an MA verify medication name, strength, and directions.

## 2025-06-12 NOTE — TELEPHONE ENCOUNTER
Patient's son returned call and was not with the patient but states that he will stop at her house on his way home from work this evening and then he will call back with the information Dr Higgins requested.

## 2025-06-13 DIAGNOSIS — G57.93 NEUROPATHIC PAIN OF BOTH FEET: ICD-10-CM

## 2025-06-13 DIAGNOSIS — M79.18 MYOFASCIAL PAIN SYNDROME: ICD-10-CM

## 2025-06-13 RX ORDER — PREGABALIN 100 MG/1
100 CAPSULE ORAL 2 TIMES DAILY
Qty: 60 CAPSULE | Refills: 0 | Status: SHIPPED | OUTPATIENT
Start: 2025-06-13

## 2025-06-13 NOTE — TELEPHONE ENCOUNTER
Patient called in stating that she takes Carvedilol 25 mg twice a day and Losartan 50 mg twice a day.

## 2025-06-16 ENCOUNTER — APPOINTMENT (OUTPATIENT)
Dept: HEMATOLOGY/ONCOLOGY | Facility: CLINIC | Age: 80
End: 2025-06-16
Payer: MEDICARE

## 2025-06-17 ENCOUNTER — TELEPHONE (OUTPATIENT)
Dept: HEMATOLOGY/ONCOLOGY | Facility: CLINIC | Age: 80
End: 2025-06-17
Payer: MEDICARE

## 2025-06-17 ENCOUNTER — APPOINTMENT (OUTPATIENT)
Dept: HEMATOLOGY/ONCOLOGY | Facility: CLINIC | Age: 80
End: 2025-06-17
Payer: MEDICARE

## 2025-06-17 NOTE — TELEPHONE ENCOUNTER
I called and spoke with Tana in regards to her missed IVIG appt today. She was not feeling well today and forgot she had an appt. She would not be able to make in today and will need to reschedule. Informed the team of this - scheduling will call her with new date.   Emily GAMBINON, RN CMSRN

## 2025-06-19 ENCOUNTER — CLINICAL SUPPORT (OUTPATIENT)
Dept: PRIMARY CARE | Facility: CLINIC | Age: 80
End: 2025-06-19
Payer: MEDICARE

## 2025-06-19 ENCOUNTER — TELEPHONE (OUTPATIENT)
Dept: PRIMARY CARE | Facility: CLINIC | Age: 80
End: 2025-06-19

## 2025-06-19 VITALS
SYSTOLIC BLOOD PRESSURE: 180 MMHG | DIASTOLIC BLOOD PRESSURE: 70 MMHG | WEIGHT: 148 LBS | BODY MASS INDEX: 27.94 KG/M2 | TEMPERATURE: 96.8 F | OXYGEN SATURATION: 93 % | RESPIRATION RATE: 20 BRPM | HEIGHT: 61 IN

## 2025-06-19 DIAGNOSIS — I10 HYPERTENSION, ESSENTIAL: Chronic | ICD-10-CM

## 2025-06-19 DIAGNOSIS — R30.0 DYSURIA: ICD-10-CM

## 2025-06-19 DIAGNOSIS — I10 HYPERTENSION, ESSENTIAL: Primary | ICD-10-CM

## 2025-06-19 DIAGNOSIS — J44.1 CHRONIC OBSTRUCTIVE PULMONARY DISEASE WITH (ACUTE) EXACERBATION (MULTI): Primary | ICD-10-CM

## 2025-06-19 PROCEDURE — 99211 OFF/OP EST MAY X REQ PHY/QHP: CPT | Performed by: STUDENT IN AN ORGANIZED HEALTH CARE EDUCATION/TRAINING PROGRAM

## 2025-06-19 RX ORDER — AMLODIPINE BESYLATE 5 MG/1
5 TABLET ORAL 2 TIMES DAILY
Qty: 60 TABLET | Refills: 3 | Status: SHIPPED | OUTPATIENT
Start: 2025-06-19

## 2025-06-19 ASSESSMENT — PAIN SCALES - GENERAL: PAINLEVEL_OUTOF10: 8

## 2025-06-19 NOTE — PROGRESS NOTES
Pt wasn't able to give urine to see about UTI.  Pt symptoms have been odor to her urine and having to go frequently at night.  - 2 weeks.    [facility] - [place]

## 2025-06-19 NOTE — TELEPHONE ENCOUNTER
Pt was here for a nurse visit (blood pressure check) and requesting a refill for her Albuterol Inhaler.    [facility] - [place]

## 2025-06-19 NOTE — PROGRESS NOTES
Pt was taken back to room 1 and vitals were taken.  Vitals were:    Blood pressure -  Right arm - 180/70  Left arm - 180/68  Temp - 96.8 F   Pulse - 71  Resp - 20  O2 - 93%  Weight - 148.0lbs   Height - 5'1    Retook blood pressure after 5 minutes - 180/70 left arm - 180/70 - right arm    Using her inhaler (alburterol) more.  Needing it at night.  Can't give deep breath.  Takes before going to sleep.  Only takes it one time.  Pt is feeling very fatigued.      Vital signs were given to Dr Higgins.  Pt had no questions and check out with .

## 2025-06-20 ENCOUNTER — CLINICAL SUPPORT (OUTPATIENT)
Dept: PRIMARY CARE | Facility: CLINIC | Age: 80
End: 2025-06-20
Payer: MEDICARE

## 2025-06-20 ENCOUNTER — APPOINTMENT (OUTPATIENT)
Dept: HEMATOLOGY/ONCOLOGY | Facility: CLINIC | Age: 80
End: 2025-06-20
Payer: MEDICARE

## 2025-06-20 VITALS
OXYGEN SATURATION: 95 % | HEIGHT: 61 IN | HEART RATE: 71 BPM | BODY MASS INDEX: 27.94 KG/M2 | WEIGHT: 148 LBS | RESPIRATION RATE: 16 BRPM

## 2025-06-20 DIAGNOSIS — R30.0 DYSURIA: Primary | ICD-10-CM

## 2025-06-20 DIAGNOSIS — N39.0 URINARY TRACT INFECTION WITHOUT HEMATURIA, SITE UNSPECIFIED: ICD-10-CM

## 2025-06-20 LAB
POC APPEARANCE, URINE: ABNORMAL
POC BILIRUBIN, URINE: ABNORMAL
POC BLOOD, URINE: NEGATIVE
POC COLOR, URINE: ABNORMAL
POC GLUCOSE, URINE: ABNORMAL MG/DL
POC KETONES, URINE: NEGATIVE MG/DL
POC LEUKOCYTES, URINE: NEGATIVE
POC NITRITE,URINE: NEGATIVE
POC PH, URINE: 7.5 PH
POC PROTEIN, URINE: ABNORMAL MG/DL
POC SPECIFIC GRAVITY, URINE: 1.01
POC UROBILINOGEN, URINE: 1 EU/DL

## 2025-06-20 PROCEDURE — 81002 URINALYSIS NONAUTO W/O SCOPE: CPT | Performed by: INTERNAL MEDICINE

## 2025-06-20 PROCEDURE — 99211 OFF/OP EST MAY X REQ PHY/QHP: CPT | Performed by: STUDENT IN AN ORGANIZED HEALTH CARE EDUCATION/TRAINING PROGRAM

## 2025-06-20 NOTE — PROGRESS NOTES
Patient present with symptoms of UTI   Provided urine sample for testing, confirmed pharmacy and location.  Patient unable to provide sample will come back this afternoon following dialysis around 3:30pm to make a second attempt

## 2025-06-22 LAB — BACTERIA UR CULT: ABNORMAL

## 2025-06-22 RX ORDER — ALBUTEROL SULFATE 90 UG/1
2 INHALANT RESPIRATORY (INHALATION) EVERY 4 HOURS PRN
Qty: 8 G | Refills: 1 | Status: SHIPPED | OUTPATIENT
Start: 2025-06-22 | End: 2026-06-22

## 2025-06-23 ENCOUNTER — APPOINTMENT (OUTPATIENT)
Dept: CARDIOLOGY | Facility: HOSPITAL | Age: 80
End: 2025-06-23
Payer: MEDICARE

## 2025-06-23 ENCOUNTER — HOSPITAL ENCOUNTER (OUTPATIENT)
Facility: HOSPITAL | Age: 80
Setting detail: OBSERVATION
Discharge: HOME | End: 2025-06-25
Attending: STUDENT IN AN ORGANIZED HEALTH CARE EDUCATION/TRAINING PROGRAM | Admitting: INTERNAL MEDICINE
Payer: MEDICARE

## 2025-06-23 DIAGNOSIS — R06.00 DYSPNEA, UNSPECIFIED TYPE: ICD-10-CM

## 2025-06-23 DIAGNOSIS — J96.01 ACUTE HYPOXIC RESPIRATORY FAILURE: ICD-10-CM

## 2025-06-23 DIAGNOSIS — N30.00 ACUTE CYSTITIS WITHOUT HEMATURIA: ICD-10-CM

## 2025-06-23 DIAGNOSIS — J44.9 COPD WITHOUT EXACERBATION (MULTI): Chronic | ICD-10-CM

## 2025-06-23 DIAGNOSIS — R53.1 GENERALIZED WEAKNESS: Primary | ICD-10-CM

## 2025-06-23 DIAGNOSIS — R55 NEAR SYNCOPE: ICD-10-CM

## 2025-06-23 LAB
ABO GROUP (TYPE) IN BLOOD: NORMAL
ALBUMIN SERPL BCP-MCNC: 3.9 G/DL (ref 3.4–5)
ALP SERPL-CCNC: 75 U/L (ref 33–136)
ALT SERPL W P-5'-P-CCNC: 16 U/L (ref 7–45)
ANION GAP BLDV CALCULATED.4IONS-SCNC: 9 MMOL/L (ref 10–25)
ANION GAP SERPL CALC-SCNC: 10 MMOL/L (ref 10–20)
ANTIBODY SCREEN: NORMAL
APTT PPP: 24 SECONDS (ref 26–36)
AST SERPL W P-5'-P-CCNC: 19 U/L (ref 9–39)
BACTERIA UR CULT: ABNORMAL
BASE EXCESS BLDV CALC-SCNC: 2.6 MMOL/L (ref -2–3)
BASOPHILS # BLD AUTO: 0.03 X10*3/UL (ref 0–0.1)
BASOPHILS NFR BLD AUTO: 0.8 %
BILIRUB SERPL-MCNC: 1.1 MG/DL (ref 0–1.2)
BNP SERPL-MCNC: 600 PG/ML (ref 0–99)
BODY TEMPERATURE: 37 DEGREES CELSIUS
BUN SERPL-MCNC: 34 MG/DL (ref 6–23)
CA-I BLDV-SCNC: 1.26 MMOL/L (ref 1.1–1.33)
CALCIUM SERPL-MCNC: 9.3 MG/DL (ref 8.6–10.3)
CARDIAC TROPONIN I PNL SERPL HS: 12 NG/L (ref 0–13)
CARDIAC TROPONIN I PNL SERPL HS: 13 NG/L (ref 0–13)
CHLORIDE BLDV-SCNC: 107 MMOL/L (ref 98–107)
CHLORIDE SERPL-SCNC: 106 MMOL/L (ref 98–107)
CO2 SERPL-SCNC: 26 MMOL/L (ref 21–32)
CREAT SERPL-MCNC: 2.9 MG/DL (ref 0.5–1.05)
EGFRCR SERPLBLD CKD-EPI 2021: 16 ML/MIN/1.73M*2
EOSINOPHIL # BLD AUTO: 0.11 X10*3/UL (ref 0–0.4)
EOSINOPHIL NFR BLD AUTO: 2.9 %
ERYTHROCYTE [DISTWIDTH] IN BLOOD BY AUTOMATED COUNT: 21.1 % (ref 11.5–14.5)
GLUCOSE BLDV-MCNC: 215 MG/DL (ref 74–99)
GLUCOSE SERPL-MCNC: 206 MG/DL (ref 74–99)
HCO3 BLDV-SCNC: 28.3 MMOL/L (ref 22–26)
HCT VFR BLD AUTO: 24.7 % (ref 36–46)
HCT VFR BLD EST: 24 % (ref 36–46)
HGB BLD-MCNC: 7.8 G/DL (ref 12–16)
HGB BLDV-MCNC: 8 G/DL (ref 12–16)
IMM GRANULOCYTES # BLD AUTO: 0.02 X10*3/UL (ref 0–0.5)
IMM GRANULOCYTES NFR BLD AUTO: 0.5 % (ref 0–0.9)
INHALED O2 CONCENTRATION: 21 %
INR PPP: 1.1 (ref 0.9–1.1)
LACTATE BLDV-SCNC: 1.8 MMOL/L (ref 0.4–2)
LYMPHOCYTES # BLD AUTO: 0.83 X10*3/UL (ref 0.8–3)
LYMPHOCYTES NFR BLD AUTO: 22.3 %
MAGNESIUM SERPL-MCNC: 1.95 MG/DL (ref 1.6–2.4)
MCH RBC QN AUTO: 30 PG (ref 26–34)
MCHC RBC AUTO-ENTMCNC: 31.6 G/DL (ref 32–36)
MCV RBC AUTO: 95 FL (ref 80–100)
MONOCYTES # BLD AUTO: 0.31 X10*3/UL (ref 0.05–0.8)
MONOCYTES NFR BLD AUTO: 8.3 %
NEUTROPHILS # BLD AUTO: 2.43 X10*3/UL (ref 1.6–5.5)
NEUTROPHILS NFR BLD AUTO: 65.2 %
NRBC BLD-RTO: 0.5 /100 WBCS (ref 0–0)
OXYHGB MFR BLDV: 48 % (ref 45–75)
PCO2 BLDV: 49 MM HG (ref 41–51)
PH BLDV: 7.37 PH (ref 7.33–7.43)
PHOSPHATE SERPL-MCNC: 4.4 MG/DL (ref 2.5–4.9)
PLATELET # BLD AUTO: 202 X10*3/UL (ref 150–450)
PO2 BLDV: 38 MM HG (ref 35–45)
POTASSIUM BLDV-SCNC: 4.1 MMOL/L (ref 3.5–5.3)
POTASSIUM SERPL-SCNC: 4 MMOL/L (ref 3.5–5.3)
PROT SERPL-MCNC: 6.4 G/DL (ref 6.4–8.2)
PROTHROMBIN TIME: 12.1 SECONDS (ref 9.8–12.4)
RBC # BLD AUTO: 2.6 X10*6/UL (ref 4–5.2)
RH FACTOR (ANTIGEN D): NORMAL
SAO2 % BLDV: 49 % (ref 45–75)
SODIUM BLDV-SCNC: 140 MMOL/L (ref 136–145)
SODIUM SERPL-SCNC: 138 MMOL/L (ref 136–145)
TSH SERPL-ACNC: 1.24 MIU/L (ref 0.44–3.98)
WBC # BLD AUTO: 3.7 X10*3/UL (ref 4.4–11.3)

## 2025-06-23 PROCEDURE — 84132 ASSAY OF SERUM POTASSIUM: CPT | Performed by: STUDENT IN AN ORGANIZED HEALTH CARE EDUCATION/TRAINING PROGRAM

## 2025-06-23 PROCEDURE — 85025 COMPLETE CBC W/AUTO DIFF WBC: CPT | Performed by: STUDENT IN AN ORGANIZED HEALTH CARE EDUCATION/TRAINING PROGRAM

## 2025-06-23 PROCEDURE — 83735 ASSAY OF MAGNESIUM: CPT | Performed by: STUDENT IN AN ORGANIZED HEALTH CARE EDUCATION/TRAINING PROGRAM

## 2025-06-23 PROCEDURE — G0378 HOSPITAL OBSERVATION PER HR: HCPCS

## 2025-06-23 PROCEDURE — 99285 EMERGENCY DEPT VISIT HI MDM: CPT | Mod: 25 | Performed by: STUDENT IN AN ORGANIZED HEALTH CARE EDUCATION/TRAINING PROGRAM

## 2025-06-23 PROCEDURE — 2500000004 HC RX 250 GENERAL PHARMACY W/ HCPCS (ALT 636 FOR OP/ED): Performed by: NURSE PRACTITIONER

## 2025-06-23 PROCEDURE — 84100 ASSAY OF PHOSPHORUS: CPT | Performed by: STUDENT IN AN ORGANIZED HEALTH CARE EDUCATION/TRAINING PROGRAM

## 2025-06-23 PROCEDURE — 83880 ASSAY OF NATRIURETIC PEPTIDE: CPT | Performed by: STUDENT IN AN ORGANIZED HEALTH CARE EDUCATION/TRAINING PROGRAM

## 2025-06-23 PROCEDURE — 99222 1ST HOSP IP/OBS MODERATE 55: CPT | Performed by: NURSE PRACTITIONER

## 2025-06-23 PROCEDURE — 84484 ASSAY OF TROPONIN QUANT: CPT | Performed by: STUDENT IN AN ORGANIZED HEALTH CARE EDUCATION/TRAINING PROGRAM

## 2025-06-23 PROCEDURE — 2500000002 HC RX 250 W HCPCS SELF ADMINISTERED DRUGS (ALT 637 FOR MEDICARE OP, ALT 636 FOR OP/ED): Performed by: NURSE PRACTITIONER

## 2025-06-23 PROCEDURE — 84132 ASSAY OF SERUM POTASSIUM: CPT | Mod: 59 | Performed by: STUDENT IN AN ORGANIZED HEALTH CARE EDUCATION/TRAINING PROGRAM

## 2025-06-23 PROCEDURE — 96365 THER/PROPH/DIAG IV INF INIT: CPT | Mod: 59

## 2025-06-23 PROCEDURE — 36415 COLL VENOUS BLD VENIPUNCTURE: CPT | Performed by: STUDENT IN AN ORGANIZED HEALTH CARE EDUCATION/TRAINING PROGRAM

## 2025-06-23 PROCEDURE — 2500000001 HC RX 250 WO HCPCS SELF ADMINISTERED DRUGS (ALT 637 FOR MEDICARE OP): Performed by: NURSE PRACTITIONER

## 2025-06-23 PROCEDURE — 96372 THER/PROPH/DIAG INJ SC/IM: CPT | Performed by: NURSE PRACTITIONER

## 2025-06-23 PROCEDURE — 86850 RBC ANTIBODY SCREEN: CPT | Performed by: STUDENT IN AN ORGANIZED HEALTH CARE EDUCATION/TRAINING PROGRAM

## 2025-06-23 PROCEDURE — 85610 PROTHROMBIN TIME: CPT | Performed by: STUDENT IN AN ORGANIZED HEALTH CARE EDUCATION/TRAINING PROGRAM

## 2025-06-23 PROCEDURE — 93005 ELECTROCARDIOGRAM TRACING: CPT

## 2025-06-23 PROCEDURE — 87040 BLOOD CULTURE FOR BACTERIA: CPT | Mod: PORLAB | Performed by: NURSE PRACTITIONER

## 2025-06-23 PROCEDURE — 85730 THROMBOPLASTIN TIME PARTIAL: CPT | Performed by: STUDENT IN AN ORGANIZED HEALTH CARE EDUCATION/TRAINING PROGRAM

## 2025-06-23 PROCEDURE — 84443 ASSAY THYROID STIM HORMONE: CPT | Performed by: NURSE PRACTITIONER

## 2025-06-23 RX ORDER — LANOLIN ALCOHOL/MO/W.PET/CERES
1000 CREAM (GRAM) TOPICAL DAILY
Status: DISCONTINUED | OUTPATIENT
Start: 2025-06-23 | End: 2025-06-25 | Stop reason: HOSPADM

## 2025-06-23 RX ORDER — PANTOPRAZOLE SODIUM 40 MG/1
40 TABLET, DELAYED RELEASE ORAL
Status: DISCONTINUED | OUTPATIENT
Start: 2025-06-24 | End: 2025-06-25 | Stop reason: HOSPADM

## 2025-06-23 RX ORDER — PREGABALIN 50 MG/1
50 CAPSULE ORAL DAILY
Status: DISCONTINUED | OUTPATIENT
Start: 2025-06-23 | End: 2025-06-25 | Stop reason: HOSPADM

## 2025-06-23 RX ORDER — GUAIFENESIN 600 MG/1
600 TABLET, EXTENDED RELEASE ORAL EVERY 12 HOURS PRN
Status: DISCONTINUED | OUTPATIENT
Start: 2025-06-23 | End: 2025-06-25 | Stop reason: HOSPADM

## 2025-06-23 RX ORDER — PIOGLITAZONE 30 MG/1
15 TABLET ORAL DAILY
Status: DISCONTINUED | OUTPATIENT
Start: 2025-06-23 | End: 2025-06-25 | Stop reason: HOSPADM

## 2025-06-23 RX ORDER — ONDANSETRON 4 MG/1
4 TABLET, FILM COATED ORAL EVERY 8 HOURS PRN
Status: DISCONTINUED | OUTPATIENT
Start: 2025-06-23 | End: 2025-06-25 | Stop reason: HOSPADM

## 2025-06-23 RX ORDER — LOSARTAN POTASSIUM 50 MG/1
50 TABLET ORAL 2 TIMES DAILY
Status: DISCONTINUED | OUTPATIENT
Start: 2025-06-23 | End: 2025-06-25 | Stop reason: HOSPADM

## 2025-06-23 RX ORDER — BISACODYL 5 MG
10 TABLET, DELAYED RELEASE (ENTERIC COATED) ORAL DAILY PRN
Status: DISCONTINUED | OUTPATIENT
Start: 2025-06-23 | End: 2025-06-25 | Stop reason: HOSPADM

## 2025-06-23 RX ORDER — POLYETHYLENE GLYCOL 3350 17 G/17G
17 POWDER, FOR SOLUTION ORAL DAILY
Status: DISCONTINUED | OUTPATIENT
Start: 2025-06-23 | End: 2025-06-25 | Stop reason: HOSPADM

## 2025-06-23 RX ORDER — HEPARIN SODIUM 5000 [USP'U]/ML
5000 INJECTION, SOLUTION INTRAVENOUS; SUBCUTANEOUS EVERY 8 HOURS SCHEDULED
Status: DISCONTINUED | OUTPATIENT
Start: 2025-06-23 | End: 2025-06-25 | Stop reason: HOSPADM

## 2025-06-23 RX ORDER — TALC
3 POWDER (GRAM) TOPICAL NIGHTLY PRN
Status: DISCONTINUED | OUTPATIENT
Start: 2025-06-23 | End: 2025-06-25 | Stop reason: HOSPADM

## 2025-06-23 RX ORDER — ATORVASTATIN CALCIUM 10 MG/1
10 TABLET, FILM COATED ORAL NIGHTLY
Status: DISCONTINUED | OUTPATIENT
Start: 2025-06-23 | End: 2025-06-25 | Stop reason: HOSPADM

## 2025-06-23 RX ORDER — ONDANSETRON HYDROCHLORIDE 2 MG/ML
4 INJECTION, SOLUTION INTRAVENOUS EVERY 8 HOURS PRN
Status: DISCONTINUED | OUTPATIENT
Start: 2025-06-23 | End: 2025-06-25 | Stop reason: HOSPADM

## 2025-06-23 RX ORDER — CHOLECALCIFEROL (VITAMIN D3) 25 MCG
50 TABLET ORAL DAILY
Status: DISCONTINUED | OUTPATIENT
Start: 2025-06-23 | End: 2025-06-25 | Stop reason: HOSPADM

## 2025-06-23 RX ORDER — PANTOPRAZOLE SODIUM 40 MG/10ML
40 INJECTION, POWDER, LYOPHILIZED, FOR SOLUTION INTRAVENOUS
Status: DISCONTINUED | OUTPATIENT
Start: 2025-06-24 | End: 2025-06-25 | Stop reason: HOSPADM

## 2025-06-23 RX ORDER — AMLODIPINE BESYLATE 5 MG/1
5 TABLET ORAL 2 TIMES DAILY
Status: DISCONTINUED | OUTPATIENT
Start: 2025-06-23 | End: 2025-06-25 | Stop reason: HOSPADM

## 2025-06-23 RX ORDER — CEFTRIAXONE 1 G/50ML
1 INJECTION, SOLUTION INTRAVENOUS EVERY 24 HOURS
Status: DISCONTINUED | OUTPATIENT
Start: 2025-06-23 | End: 2025-06-25 | Stop reason: HOSPADM

## 2025-06-23 RX ORDER — CARVEDILOL 25 MG/1
25 TABLET ORAL 2 TIMES DAILY
Status: DISCONTINUED | OUTPATIENT
Start: 2025-06-23 | End: 2025-06-25 | Stop reason: HOSPADM

## 2025-06-23 RX ORDER — ACETAMINOPHEN 325 MG/1
650 TABLET ORAL EVERY 4 HOURS PRN
Status: DISCONTINUED | OUTPATIENT
Start: 2025-06-23 | End: 2025-06-25 | Stop reason: HOSPADM

## 2025-06-23 RX ADMIN — CEFTRIAXONE 1 G: 1 INJECTION, SOLUTION INTRAVENOUS at 16:15

## 2025-06-23 RX ADMIN — CARVEDILOL 25 MG: 25 TABLET, FILM COATED ORAL at 22:01

## 2025-06-23 RX ADMIN — HEPARIN SODIUM 5000 UNITS: 5000 INJECTION, SOLUTION INTRAVENOUS; SUBCUTANEOUS at 22:01

## 2025-06-23 RX ADMIN — ATORVASTATIN CALCIUM 10 MG: 10 TABLET, FILM COATED ORAL at 22:01

## 2025-06-23 RX ADMIN — Medication 1000 MCG: at 16:15

## 2025-06-23 RX ADMIN — AMLODIPINE BESYLATE 5 MG: 5 TABLET ORAL at 22:01

## 2025-06-23 RX ADMIN — HEPARIN SODIUM 5000 UNITS: 5000 INJECTION, SOLUTION INTRAVENOUS; SUBCUTANEOUS at 16:14

## 2025-06-23 RX ADMIN — LOSARTAN POTASSIUM 50 MG: 50 TABLET, FILM COATED ORAL at 22:01

## 2025-06-23 RX ADMIN — Medication 50 MCG: at 16:15

## 2025-06-23 RX ADMIN — PIOGLITAZONE HYDROCHLORIDE 15 MG: 30 TABLET ORAL at 16:15

## 2025-06-23 RX ADMIN — ACETAMINOPHEN 650 MG: 325 TABLET ORAL at 16:14

## 2025-06-23 SDOH — ECONOMIC STABILITY: FOOD INSECURITY: WITHIN THE PAST 12 MONTHS, THE FOOD YOU BOUGHT JUST DIDN'T LAST AND YOU DIDN'T HAVE MONEY TO GET MORE.: NEVER TRUE

## 2025-06-23 SDOH — ECONOMIC STABILITY: HOUSING INSECURITY: IN THE PAST 12 MONTHS, HOW MANY TIMES HAVE YOU MOVED WHERE YOU WERE LIVING?: 0

## 2025-06-23 SDOH — SOCIAL STABILITY: SOCIAL INSECURITY: WERE YOU ABLE TO COMPLETE ALL THE BEHAVIORAL HEALTH SCREENINGS?: YES

## 2025-06-23 SDOH — ECONOMIC STABILITY: FOOD INSECURITY: WITHIN THE PAST 12 MONTHS, YOU WORRIED THAT YOUR FOOD WOULD RUN OUT BEFORE YOU GOT THE MONEY TO BUY MORE.: NEVER TRUE

## 2025-06-23 SDOH — ECONOMIC STABILITY: HOUSING INSECURITY: AT ANY TIME IN THE PAST 12 MONTHS, WERE YOU HOMELESS OR LIVING IN A SHELTER (INCLUDING NOW)?: NO

## 2025-06-23 SDOH — ECONOMIC STABILITY: HOUSING INSECURITY: IN THE LAST 12 MONTHS, WAS THERE A TIME WHEN YOU WERE NOT ABLE TO PAY THE MORTGAGE OR RENT ON TIME?: NO

## 2025-06-23 SDOH — SOCIAL STABILITY: SOCIAL INSECURITY: WITHIN THE LAST YEAR, HAVE YOU BEEN HUMILIATED OR EMOTIONALLY ABUSED IN OTHER WAYS BY YOUR PARTNER OR EX-PARTNER?: NO

## 2025-06-23 SDOH — ECONOMIC STABILITY: TRANSPORTATION INSECURITY: IN THE PAST 12 MONTHS, HAS LACK OF TRANSPORTATION KEPT YOU FROM MEDICAL APPOINTMENTS OR FROM GETTING MEDICATIONS?: NO

## 2025-06-23 SDOH — SOCIAL STABILITY: SOCIAL INSECURITY: WITHIN THE LAST YEAR, HAVE YOU BEEN AFRAID OF YOUR PARTNER OR EX-PARTNER?: NO

## 2025-06-23 SDOH — SOCIAL STABILITY: SOCIAL INSECURITY: HAVE YOU HAD THOUGHTS OF HARMING ANYONE ELSE?: NO

## 2025-06-23 SDOH — ECONOMIC STABILITY: INCOME INSECURITY: IN THE PAST 12 MONTHS HAS THE ELECTRIC, GAS, OIL, OR WATER COMPANY THREATENED TO SHUT OFF SERVICES IN YOUR HOME?: NO

## 2025-06-23 SDOH — ECONOMIC STABILITY: FOOD INSECURITY: HOW HARD IS IT FOR YOU TO PAY FOR THE VERY BASICS LIKE FOOD, HOUSING, MEDICAL CARE, AND HEATING?: NOT HARD AT ALL

## 2025-06-23 ASSESSMENT — COGNITIVE AND FUNCTIONAL STATUS - GENERAL
MOBILITY SCORE: 20
TOILETING: A LITTLE
CLIMB 3 TO 5 STEPS WITH RAILING: A LITTLE
STANDING UP FROM CHAIR USING ARMS: A LITTLE
DAILY ACTIVITIY SCORE: 24
MOVING TO AND FROM BED TO CHAIR: A LITTLE
PATIENT BASELINE BEDBOUND: NO
MOVING TO AND FROM BED TO CHAIR: A LITTLE
CLIMB 3 TO 5 STEPS WITH RAILING: A LITTLE
DRESSING REGULAR UPPER BODY CLOTHING: A LITTLE
DRESSING REGULAR LOWER BODY CLOTHING: A LITTLE
WALKING IN HOSPITAL ROOM: A LITTLE
STANDING UP FROM CHAIR USING ARMS: A LITTLE
DAILY ACTIVITIY SCORE: 20
HELP NEEDED FOR BATHING: A LITTLE
TURNING FROM BACK TO SIDE WHILE IN FLAT BAD: A LITTLE
MOBILITY SCORE: 19
WALKING IN HOSPITAL ROOM: A LITTLE

## 2025-06-23 ASSESSMENT — PAIN SCALES - GENERAL
PAINLEVEL_OUTOF10: 0 - NO PAIN
PAINLEVEL_OUTOF10: 7
PAINLEVEL_OUTOF10: 0 - NO PAIN
PAINLEVEL_OUTOF10: 0 - NO PAIN

## 2025-06-23 ASSESSMENT — LIFESTYLE VARIABLES
HAVE PEOPLE ANNOYED YOU BY CRITICIZING YOUR DRINKING: NO
SUBSTANCE_ABUSE_PAST_12_MONTHS: NO
PRESCIPTION_ABUSE_PAST_12_MONTHS: NO
EVER FELT BAD OR GUILTY ABOUT YOUR DRINKING: NO
HOW OFTEN DO YOU HAVE 6 OR MORE DRINKS ON ONE OCCASION: NEVER
TOTAL SCORE: 0
EVER HAD A DRINK FIRST THING IN THE MORNING TO STEADY YOUR NERVES TO GET RID OF A HANGOVER: NO
SKIP TO QUESTIONS 9-10: 1
HOW OFTEN DO YOU HAVE A DRINK CONTAINING ALCOHOL: NEVER
AUDIT-C TOTAL SCORE: 0
HAVE YOU EVER FELT YOU SHOULD CUT DOWN ON YOUR DRINKING: NO
HOW MANY STANDARD DRINKS CONTAINING ALCOHOL DO YOU HAVE ON A TYPICAL DAY: PATIENT DOES NOT DRINK
AUDIT-C TOTAL SCORE: 0

## 2025-06-23 ASSESSMENT — ACTIVITIES OF DAILY LIVING (ADL)
LACK_OF_TRANSPORTATION: NO
LACK_OF_TRANSPORTATION: NO
DRESSING YOURSELF: NEEDS ASSISTANCE
BATHING: NEEDS ASSISTANCE
HEARING - RIGHT EAR: FUNCTIONAL
JUDGMENT_ADEQUATE_SAFELY_COMPLETE_DAILY_ACTIVITIES: YES
PATIENT'S MEMORY ADEQUATE TO SAFELY COMPLETE DAILY ACTIVITIES?: YES
ASSISTIVE_DEVICE: DENTURES LOWER;DENTURES UPPER;WALKER
FEEDING YOURSELF: INDEPENDENT
TOILETING: NEEDS ASSISTANCE
GROOMING: INDEPENDENT
HEARING - LEFT EAR: FUNCTIONAL
ADEQUATE_TO_COMPLETE_ADL: YES
WALKS IN HOME: NEEDS ASSISTANCE

## 2025-06-23 ASSESSMENT — PAIN - FUNCTIONAL ASSESSMENT
PAIN_FUNCTIONAL_ASSESSMENT: 0-10

## 2025-06-23 ASSESSMENT — PATIENT HEALTH QUESTIONNAIRE - PHQ9
SUM OF ALL RESPONSES TO PHQ9 QUESTIONS 1 & 2: 0
1. LITTLE INTEREST OR PLEASURE IN DOING THINGS: NOT AT ALL
2. FEELING DOWN, DEPRESSED OR HOPELESS: NOT AT ALL

## 2025-06-23 NOTE — CARE PLAN
The patient's goals for the shift include  control    The clinical goals for the shift include pt will remain HDS this shift

## 2025-06-23 NOTE — ED PROVIDER NOTES
"    HPI   Chief Complaint   Patient presents with    Weakness, Gen     Supposed to get dialysis today @ 1230, last one was Friday full treatment        HPI: Patient is an 80-year-old female, she has a past medical history of hypertension, hyperlipidemia, type 2 diabetes, anemia of chronic disease, end-stage renal disease on hemodialysis Monday Wednesday Friday through a right IJ tunneled permacath, she is presenting to the emergency department for generalized weakness and an episode of near syncope.  Patient was sitting down at the computer when she suddenly got very lightheaded and very weak and felt like she was get a pass out.  She did not fully lose consciousness, she did not fall and hit her head.  She is presenting to the ER for further evaluation after pressing her life alert.  She is currently denying any symptoms other than feeling just generally rundown.  No shortness of breath, no chest pain, no abdominal pain, no back pain, currently not feeling lightheaded or dizzy..      ROS: Complete 12 point review of systems performed, otherwise negative except as noted in the history of present illness    PMH: Reviewed, documented below in note. Pertinents in HPI  PSH: Reviewed and documented below in note. Pertinents in HPI  SH: No illicits, not homeless  Fam: Reviewed, noncontributory to patients current complaint  MEDS: Reviewed and documented below in note. Pertinents in HPI  ALLERGIES: Reviewed and documented below in note.        History provided by:  Patient   used: No                          Tasneem Coma Scale Score: 15                  Patient History   Medical History[1]  Surgical History[2]  Family History[3]  Social History[4]    Physical Exam   Visit Vitals  /65   Pulse 52   Temp 35.8 °C (96.4 °F)   Resp 10   Ht 1.549 m (5' 1\")   Wt 68 kg (150 lb)   SpO2 94%   BMI 28.34 kg/m²   OB Status Postmenopausal   Smoking Status Never   BSA 1.71 m²      Physical Exam  Vitals and " nursing note reviewed.   Constitutional:       Appearance: Normal appearance.   HENT:      Head: Normocephalic and atraumatic.   Neck:      Vascular: No carotid bruit.   Cardiovascular:      Rate and Rhythm: Normal rate and regular rhythm.      Pulses: Normal pulses.      Heart sounds: Normal heart sounds.   Pulmonary:      Effort: Pulmonary effort is normal.      Breath sounds: Normal breath sounds.   Abdominal:      General: There is no distension.      Palpations: Abdomen is soft.      Tenderness: There is no abdominal tenderness. There is no right CVA tenderness, left CVA tenderness, guarding or rebound.   Musculoskeletal:         General: No tenderness, deformity or signs of injury.      Cervical back: Normal range of motion. No rigidity.      Right lower leg: No edema.      Left lower leg: No edema.   Skin:     General: Skin is warm and dry.      Capillary Refill: Capillary refill takes less than 2 seconds.   Neurological:      General: No focal deficit present.      Mental Status: She is alert and oriented to person, place, and time.      Sensory: No sensory deficit.      Motor: No weakness.   Psychiatric:         Mood and Affect: Mood normal.         Behavior: Behavior normal.         No orders to display       Labs Reviewed   CBC WITH AUTO DIFFERENTIAL - Abnormal       Result Value    WBC 3.7 (*)     nRBC 0.5 (*)     RBC 2.60 (*)     Hemoglobin 7.8 (*)     Hematocrit 24.7 (*)     MCV 95      MCH 30.0      MCHC 31.6 (*)     RDW 21.1 (*)     Platelets 202      Neutrophils % 65.2      Immature Granulocytes %, Automated 0.5      Lymphocytes % 22.3      Monocytes % 8.3      Eosinophils % 2.9      Basophils % 0.8      Neutrophils Absolute 2.43      Immature Granulocytes Absolute, Automated 0.02      Lymphocytes Absolute 0.83      Monocytes Absolute 0.31      Eosinophils Absolute 0.11      Basophils Absolute 0.03     COMPREHENSIVE METABOLIC PANEL - Abnormal    Glucose 206 (*)     Sodium 138      Potassium 4.0       Chloride 106      Bicarbonate 26      Anion Gap 10      Urea Nitrogen 34 (*)     Creatinine 2.90 (*)     eGFR 16 (*)     Calcium 9.3      Albumin 3.9      Alkaline Phosphatase 75      Total Protein 6.4      AST 19      Bilirubin, Total 1.1      ALT 16     APTT - Abnormal    aPTT 24 (*)     Narrative:     The APTT is no longer used for monitoring Unfractionated Heparin Therapy. For monitoring Heparin Therapy, use the Heparin Assay.   B-TYPE NATRIURETIC PEPTIDE - Abnormal     (*)     Narrative:        <100 pg/mL - Heart failure unlikely  100-299 pg/mL - Intermediate probability of acute heart                  failure exacerbation. Correlate with clinical                  context and patient history.    >=300 pg/mL - Heart Failure likely. Correlate with clinical                  context and patient history.    BNP testing is performed using different testing methodology at [facility] than at other [facility] hospitals. Direct result comparisons should only be made within the same method.      BLOOD GAS VENOUS FULL PANEL - Abnormal    POCT pH, Venous 7.37      POCT pCO2, Venous 49      POCT pO2, Venous 38      POCT SO2, Venous 49      POCT Oxy Hemoglobin, Venous 48.0      POCT Hematocrit Calculated, Venous 24.0 (*)     POCT Sodium, Venous 140      POCT Potassium, Venous 4.1      POCT Chloride, Venous 107      POCT Ionized Calicum, Venous 1.26      POCT Glucose, Venous 215 (*)     POCT Lactate, Venous 1.8      POCT Base Excess, Venous 2.6      POCT HCO3 Calculated, Venous 28.3 (*)     POCT Hemoglobin, Venous 8.0 (*)     POCT Anion Gap, Venous 9.0 (*)     Patient Temperature 37.0      FiO2 21     PHOSPHORUS - Normal    Phosphorus 4.4     MAGNESIUM - Normal    Magnesium 1.95     PROTIME-INR - Normal    Protime 12.1      INR 1.1     SERIAL TROPONIN-INITIAL - Normal    Troponin I, High Sensitivity 13      Narrative:     Less than 99th percentile of normal range cutoff-  Female and children under 18 years old  <14 ng/L; Male <21 ng/L: Negative  Repeat testing should be performed if clinically indicated.     Female and children under 18 years old 14-50 ng/L; Male 21-50 ng/L:  Consistent with possible cardiac damage and possible increased clinical   risk. Serial measurements may help to assess extent of myocardial damage.     >50 ng/L: Consistent with cardiac damage, increased clinical risk and  myocardial infarction. Serial measurements may help assess extent of   myocardial damage.      NOTE: Children less than 1 year old may have higher baseline troponin   levels and results should be interpreted in conjunction with the overall   clinical context.     NOTE: Troponin I testing is performed using a different   testing methodology at [facility] than at other   [facility]. Direct result comparisons should only   be made within the same method.   SERIAL TROPONIN, 1 HOUR - Normal    Troponin I, High Sensitivity 12      Narrative:     Less than 99th percentile of normal range cutoff-  Female and children under 18 years old <14 ng/L; Male <21 ng/L: Negative  Repeat testing should be performed if clinically indicated.     Female and children under 18 years old 14-50 ng/L; Male 21-50 ng/L:  Consistent with possible cardiac damage and possible increased clinical   risk. Serial measurements may help to assess extent of myocardial damage.     >50 ng/L: Consistent with cardiac damage, increased clinical risk and  myocardial infarction. Serial measurements may help assess extent of   myocardial damage.      NOTE: Children less than 1 year old may have higher baseline troponin   levels and results should be interpreted in conjunction with the overall   clinical context.     NOTE: Troponin I testing is performed using a different   testing methodology at [facility] than at other   [facility]. Direct result comparisons should only   be made within the same method.   TROPONIN SERIES- (INITIAL, 1 HR)     Narrative:     The following orders were created for panel order Troponin I Series, High Sensitivity (0, 1 HR).  Procedure                               Abnormality         Status                     ---------                               -----------         ------                     Troponin I, High Sensiti...[[id number]]  Normal              Final result               Troponin, High Sensitivi...[[id number]]  Normal              Final result                 Please view results for these tests on the individual orders.   TYPE AND SCREEN    ABO TYPE A      Rh TYPE POS      ANTIBODY SCREEN NEG           ED Course & MDM   ED Course as of 06/23/25 1318   Mon Jun 23, 2025   1039 EKG as interpreted by myself demonstrates sinus bradycardia with a rate of 50, normal axis, normal VA and QRS interval with prolonged QTc interval, LVH present, no evidence of an acute STEMI. [NS]      ED Course User Index  [NS] Alton Márquez MD           Medical Decision Making         Your medication list        ASK your doctor about these medications        Instructions Last Dose Given Next Dose Due   albuterol 90 mcg/actuation inhaler  Commonly known as: Ventolin HFA      Inhale 2 puffs every 4 hours if needed for wheezing or shortness of breath.       allopurinol 100 mg tablet  Commonly known as: Zyloprim           amLODIPine 5 mg tablet  Commonly known as: Norvasc      Take 1 tablet (5 mg) by mouth 2 times a day.       atorvastatin 10 mg tablet  Commonly known as: Lipitor      Take 1 tablet (10 mg) by mouth once daily.       carvedilol 25 mg tablet  Commonly known as: Coreg      Take 1 tablet (25 mg) by mouth 2 times a day.       cholecalciferol 50 mcg (2,000 units) tablet  Commonly known as: Vitamin D-3           cyanocobalamin 1,000 mcg tablet  Commonly known as: Vitamin B-12           losartan 50 mg tablet  Commonly known as: Cozaar      Take 1 tablet (50 mg) by mouth 2 times a day.       pantoprazole 40 mg EC  tablet  Commonly known as: ProtoNix      Take 1 tablet (40 mg) by mouth once daily in the morning. Take before meals. Do not crush, chew, or split.       pioglitazone 15 mg tablet  Commonly known as: Actos      Take 1 tablet (15 mg) by mouth once daily.       pregabalin 100 mg capsule  Commonly known as: Lyrica      Take 1 capsule (100 mg) by mouth 2 times a day.                Procedure  Procedures     *This report was transcribed using voice recognition software.  Every effort was made to ensure accuracy; however, inadvertent computerized transcription errors may be present.*  Alton Márquez MD  06/23/25           [1]   Past Medical History:  Diagnosis Date    Abnormal levels of other serum enzymes     Elevated liver enzymes    Acute kidney failure     Anemia     CKD (chronic kidney disease)     COPD (chronic obstructive pulmonary disease) (Multi)     Coronary artery disease     Disease of blood and blood-forming organs, unspecified     Bone marrow disorder    HLD (hyperlipidemia)     Hypertension     MDS (myelodysplastic syndrome) (Multi)     Personal history of other diseases of the musculoskeletal system and connective tissue     History of muscle pain    Personal history of other specified conditions     History of insomnia    Personal history of other specified conditions     History of edema    Type 2 diabetes mellitus     Urinary tract infection, site not specified 10/17/2024   [2]   Past Surgical History:  Procedure Laterality Date    APPENDECTOMY      BREAST BIOPSY Left     left excisional    CHOLECYSTECTOMY  06/06/2024    partial cholecystectomy    COLONOSCOPY      HYSTERECTOMY      JOINT REPLACEMENT      MR HEAD ANGIO WO IV CONTRAST  11/20/2020    MR HEAD ANGIO WO IV CONTRAST 11/20/2020 [facility]    MR NECK ANGIO WO IV CONTRAST  11/20/2020    MR NECK ANGIO WO IV CONTRAST 11/20/2020 [facility]    OOPHORECTOMY      OTHER SURGICAL HISTORY  12/13/2019    Oophorectomy bilateral     OTHER SURGICAL HISTORY  12/13/2019    Tubal ligation    OTHER SURGICAL HISTORY Bilateral 12/13/2019    Knee replacement    OTHER SURGICAL HISTORY Left 12/13/2019    Shoulder surgery    OTHER SURGICAL HISTORY  12/13/2019    Hysterectomy    OTHER SURGICAL HISTORY  12/13/2019    Lumpectomy    OTHER SURGICAL HISTORY Right 12/13/2019    Foot surgery    OTHER SURGICAL HISTORY  04/16/2021    Back surgery   [3]   Family History  Problem Relation Name Age of Onset    Heart disease Mother      Heart attack Father      Hodgkin's lymphoma Son     [4]   Social History  Tobacco Use    Smoking status: Never     Passive exposure: Never    Smokeless tobacco: Never   Vaping Use    Vaping status: Never Used   Substance Use Topics    Alcohol use: Not Currently    Drug use: Never

## 2025-06-23 NOTE — H&P
[facility] - GENERAL MEDICINE HISTORY AND PHYSICAL    HISTORY OF PRESENT ILLNESS     History Obtained From (Primary Source): Myelodysplasia  Collateral History (Secondary Sources): D/w ED    History Of Present Illness (HPI):  Tana Hargrove is a 80 y.o. female with PMHx s/f myelodysplastic syndrome on Procrit, end-stage renal disease on hemodialysis, type 2 diabetes, hypertension, hyperlipidemia, diastolic heart failure LVEF 64% April 2025 presenting with near syncope.  Patient has been having issues with urinary frequency he did see a primary care provider last week gave urine sample.  The patient denies any fevers chills or rigors no dysuria.  Culture seems to have come back E. coli sensitive to Rocephin.  Patient has not been taking any antibiotics.  This morning patient had gotten out of bed was sitting down ordering something on the computer suddenly became very diaphoretic felt lightheaded like she was going to pass out.  Symptoms seem to improve.  Patient was evaluated in emergency department, workup in the emergency department shows the patient to have hyperglycemia elevated creatinine of 2.90 from baseline around 2 patient had Hellen cytopenia with white blood cell count of 3.7 anemia was about baseline at 7.8.  Case was discussed with the ED provider patient is to be admitted for further evaluation and management.  The length of stay is not expected to exceed 2 midnights.    ED Course:   Vitals on presentation: T 35.8 °C (96.4 °F)  HR 50  /55  RR 18  O2 95 % None (Room air)  Labs:   CBC with WBC 3.7, Hgb 7.8, Plts 202.   CMP with glucose 206, Na 138, K 4.0, BUN 34, sCr 2.90, alk phos 75, ALT 16, AST 19, bilirubin 1.1. Magnesium 1.95.   . Trop 12.   Lactate 1.1  EKG: Sinus bradycardia 50 bpm QTc interval is prolonged 550 ms no ST segment changes to suggest acute ischemia  Imaging - agree with radiology interpretation(s):   Interventions: Referred for admission    12-point DAVID  reviewed and found to be negative aside from aforementioned positives in HPI and/or noted in dedicated ROS section below.     Decision made to admit the patient to the hospitalist service after evaluation of the patient, review of the above, and discussion with ED provider.     LABS AND IMAGING     I have personally reviewed the following labs from 06/23/25: CBC, CMP, Troponin, and BNP  I have personally reviewed any obtained EKGs on 06/23/25, with my interpretation as listed above in the ED summary course.   I have personally reviewed the patient's vitals on presentation to the ED and any/all changes through to time of admission (on 06/23/25).     ED Course (From ED Provider):  ED Course as of 06/23/25 1503   Mon Jun 23, 2025   1039 EKG as interpreted by myself demonstrates sinus bradycardia with a rate of 50, normal axis, normal MA and QRS interval with prolonged QTc interval, LVH present, no evidence of an acute STEMI. [NS]      ED Course User Index  [NS] Alton Márquez MD         Diagnoses as of 06/23/25 1503   Generalized weakness   Near syncope     Relevant Results  Results for orders placed or performed during the hospital encounter of 06/23/25 (from the past 24 hours)   CBC and Auto Differential   Result Value Ref Range    WBC 3.7 (L) 4.4 - 11.3 x10*3/uL    nRBC 0.5 (H) 0.0 - 0.0 /100 WBCs    RBC 2.60 (L) 4.00 - 5.20 x10*6/uL    Hemoglobin 7.8 (L) 12.0 - 16.0 g/dL    Hematocrit 24.7 (L) 36.0 - 46.0 %    MCV 95 80 - 100 fL    MCH 30.0 26.0 - 34.0 pg    MCHC 31.6 (L) 32.0 - 36.0 g/dL    RDW 21.1 (H) 11.5 - 14.5 %    Platelets 202 150 - 450 x10*3/uL    Neutrophils % 65.2 40.0 - 80.0 %    Immature Granulocytes %, Automated 0.5 0.0 - 0.9 %    Lymphocytes % 22.3 13.0 - 44.0 %    Monocytes % 8.3 2.0 - 10.0 %    Eosinophils % 2.9 0.0 - 6.0 %    Basophils % 0.8 0.0 - 2.0 %    Neutrophils Absolute 2.43 1.60 - 5.50 x10*3/uL    Immature Granulocytes Absolute, Automated 0.02 0.00 - 0.50 x10*3/uL    Lymphocytes  Absolute 0.83 0.80 - 3.00 x10*3/uL    Monocytes Absolute 0.31 0.05 - 0.80 x10*3/uL    Eosinophils Absolute 0.11 0.00 - 0.40 x10*3/uL    Basophils Absolute 0.03 0.00 - 0.10 x10*3/uL   Phosphorus   Result Value Ref Range    Phosphorus 4.4 2.5 - 4.9 mg/dL   Magnesium   Result Value Ref Range    Magnesium 1.95 1.60 - 2.40 mg/dL   Comprehensive metabolic panel   Result Value Ref Range    Glucose 206 (H) 74 - 99 mg/dL    Sodium 138 136 - 145 mmol/L    Potassium 4.0 3.5 - 5.3 mmol/L    Chloride 106 98 - 107 mmol/L    Bicarbonate 26 21 - 32 mmol/L    Anion Gap 10 10 - 20 mmol/L    Urea Nitrogen 34 (H) 6 - 23 mg/dL    Creatinine 2.90 (H) 0.50 - 1.05 mg/dL    eGFR 16 (L) >60 mL/min/1.73m*2    Calcium 9.3 8.6 - 10.3 mg/dL    Albumin 3.9 3.4 - 5.0 g/dL    Alkaline Phosphatase 75 33 - 136 U/L    Total Protein 6.4 6.4 - 8.2 g/dL    AST 19 9 - 39 U/L    Bilirubin, Total 1.1 0.0 - 1.2 mg/dL    ALT 16 7 - 45 U/L   Protime-INR   Result Value Ref Range    Protime 12.1 9.8 - 12.4 seconds    INR 1.1 0.9 - 1.1   aPTT   Result Value Ref Range    aPTT 24 (L) 26 - 36 seconds   B-Type Natriuretic Peptide   Result Value Ref Range     (H) 0 - 99 pg/mL   Type And Screen   Result Value Ref Range    ABO TYPE A     Rh TYPE POS     ANTIBODY SCREEN NEG    Blood Gas Venous Full Panel   Result Value Ref Range    POCT pH, Venous 7.37 7.33 - 7.43 pH    POCT pCO2, Venous 49 41 - 51 mm Hg    POCT pO2, Venous 38 35 - 45 mm Hg    POCT SO2, Venous 49 45 - 75 %    POCT Oxy Hemoglobin, Venous 48.0 45.0 - 75.0 %    POCT Hematocrit Calculated, Venous 24.0 (L) 36.0 - 46.0 %    POCT Sodium, Venous 140 136 - 145 mmol/L    POCT Potassium, Venous 4.1 3.5 - 5.3 mmol/L    POCT Chloride, Venous 107 98 - 107 mmol/L    POCT Ionized Calicum, Venous 1.26 1.10 - 1.33 mmol/L    POCT Glucose, Venous 215 (H) 74 - 99 mg/dL    POCT Lactate, Venous 1.8 0.4 - 2.0 mmol/L    POCT Base Excess, Venous 2.6 -2.0 - 3.0 mmol/L    POCT HCO3 Calculated, Venous 28.3 (H) 22.0 - 26.0  mmol/L    POCT Hemoglobin, Venous 8.0 (L) 12.0 - 16.0 g/dL    POCT Anion Gap, Venous 9.0 (L) 10.0 - 25.0 mmol/L    Patient Temperature 37.0 degrees Celsius    FiO2 21 %   Troponin I, High Sensitivity, Initial   Result Value Ref Range    Troponin I, High Sensitivity 13 0 - 13 ng/L   Troponin, High Sensitivity, 1 Hour   Result Value Ref Range    Troponin I, High Sensitivity 12 0 - 13 ng/L     *Note: Due to a large number of results and/or encounters for the requested time period, some results have not been displayed. A complete set of results can be found in Results Review.      Imaging  No results found.    Cardiology, Vascular, and Other Imaging  No other imaging results found for the past 2 days       PAST HISTORIES AND ALLERGIES     Past Medical History  She has a past medical history of Abnormal levels of other serum enzymes, Acute kidney failure, Anemia, CKD (chronic kidney disease), COPD (chronic obstructive pulmonary disease) (Multi), Coronary artery disease, Disease of blood and blood-forming organs, unspecified, HLD (hyperlipidemia), Hypertension, MDS (myelodysplastic syndrome) (Multi), Personal history of other diseases of the musculoskeletal system and connective tissue, Personal history of other specified conditions, Personal history of other specified conditions, Type 2 diabetes mellitus, and Urinary tract infection, site not specified (10/17/2024).    She has no past medical history of Adverse effect of anesthesia, Arthritis, Asthma, Awareness under anesthesia, CHF (congestive heart failure), Delayed emergence from general anesthesia, Disease of thyroid gland, Hard to intubate, History of transfusion, Malignant hyperthermia, PONV (postoperative nausea and vomiting), Pseudocholinesterase deficiency, Sickle cell anemia, Spinal headache, or Stroke (Multi).    Surgical History  She has a past surgical history that includes Other surgical history (12/13/2019); Other surgical history (12/13/2019); Other  surgical history (Bilateral, 12/13/2019); Other surgical history (Left, 12/13/2019); Other surgical history (12/13/2019); Other surgical history (12/13/2019); Other surgical history (Right, 12/13/2019); Other surgical history (04/16/2021); MR angio head wo IV contrast (11/20/2020); MR angio neck wo IV contrast (11/20/2020); Breast biopsy (Left); Hysterectomy; Appendectomy; Colonoscopy; Oophorectomy; Joint replacement; and Cholecystectomy (06/06/2024).     Social History  She reports that she has never smoked. She has never been exposed to tobacco smoke. She has never used smokeless tobacco. She reports that she does not currently use alcohol. She reports that she does not use drugs.    Family History  Family History[1]    Allergies  Codeine, Hydrocodone, Hydrocodone-acetaminophen, Tramadol, Piperacillin-tazobactam, Adhesive tape-silicones, and Meperidine    MEDICATIONS     Scheduled Medications:  Scheduled Medications[2]  Continuous Medications:  Continuous Medications[3]  PRN Medications:  PRN Medications[4]     REVIEW OF SYSTEMS     Review of Systems    OBJECTIVE     Last Recorded Vitals  BP (!) 188/74   Pulse 54   Temp 35.8 °C (96.4 °F)   Resp 10   Wt 68 kg (150 lb)   SpO2 (!) 91%      Physical Exam:  Vital signs and nursing notes reviewed.   Constitutional: Pleasant and cooperative. Laying in bed in no acute distress. Conversant.   Skin: Warm and dry; no obvious lesions, rashes, pallor, or jaundice.   Eyes: EOMI. Anicteric sclera.   ENT: Mucous membranes moist; no obvious injury or deformity appreciated.   Head and Neck: Normocephalic, atraumatic. ROM preserved. Trachea midline. No appreciable JVD.   Respiratory: Nonlabored on room air. Lungs clear to auscultation bilaterally without obvious adventitious sounds. Chest rise is equal.  Cardiovascular: RRR. No gross murmur, gallop, or rub. Extremities are warm and well-perfused with good capillary refill (< 3 seconds). No chest wall tenderness.   GI: Abdomen  soft, nontender, nondistended. No obvious organomegaly appreciated. Bowel sounds are present.  : No CVA tenderness.   MSK: No gross abnormalities appreciated. No limitations to AROM/PROM appreciated.   Extremities: No cyanosis, edema, or clubbing evident. Neurovascularly intact.   Neuro: A&Ox3. CN 2-12 grossly intact. Able to respond to questions appropriately and clearly. No acute focal neurologic deficits appreciated.  Psych: Appropriate mood and behavior.    ASSESSMENT AND PLAN   Assessment/Plan     80 y.o. female with PMHx s/f myelodysplastic syndrome on Procrit, end-stage renal disease on hemodialysis, type 2 diabetes, hypertension, hyperlipidemia, diastolic heart failure LVEF 64% April 2025 presenting with syncope.    Near syncope  Possibly multifactorial patient is on high doses of pregabalin but has not bothered her before.    Given the slight elevation in creatinine and her general weak state we will hold a dose today consider restarting at a lower dose.    Patient may also be symptomatic from urinary tract infection will cover patient with Rocephin   Sinus bradycardia noted on EKG and some of her initial vital sign recordings   Will place on telemetry and monitored, also put parameters on her carvedilol for now   Patient is anemic but appears to be at baseline     E. coli UTI   Will request a repeat urine analysis and blood cultures   Start patient empirically on Rocephin based on her prior culture results         End-stage renal disease  Patient on dialysis Monday Wednesday Friday missed hemodialysis today  Consult to nephrology patient is followed by Dr. Avelar    Type 2 diabetes  Patient does not appear to be on any significant hypoglycemic agents  Continue her low-dose Actos    Hyperlipidemia  Continue statin therapy    Hypertension  Continue her carvedilol and other antihypertensive medications    Chronic diastolic heart failure LVEF 64% April 2025  Continue patient on carvedilol  Volume management per  dialysis    Room air   Jose Miguel Armendariz, APRN-CNP    Dragon dictation software was used to dictate this note and thus there may be minor errors in translation/transcription including garbled speech or misspellings. Please contact for clarification if needed.         [1]   Family History  Problem Relation Name Age of Onset    Heart disease Mother      Heart attack Father      Hodgkin's lymphoma Son     [2] carvedilol, 25 mg, oral, BID    [3]    [4]

## 2025-06-24 ENCOUNTER — APPOINTMENT (OUTPATIENT)
Dept: DIALYSIS | Facility: HOSPITAL | Age: 80
End: 2025-06-24
Payer: MEDICARE

## 2025-06-24 LAB
APPEARANCE UR: CLEAR
ATRIAL RATE: 50 BPM
BACTERIA #/AREA URNS AUTO: ABNORMAL /HPF
BILIRUB UR STRIP.AUTO-MCNC: NEGATIVE MG/DL
COLOR UR: ABNORMAL
GLUCOSE UR STRIP.AUTO-MCNC: NORMAL MG/DL
KETONES UR STRIP.AUTO-MCNC: NEGATIVE MG/DL
LEUKOCYTE ESTERASE UR QL STRIP.AUTO: NEGATIVE
NITRITE UR QL STRIP.AUTO: NEGATIVE
P AXIS: 15 DEGREES
PH UR STRIP.AUTO: 6.5 [PH]
PR INTERVAL: 162 MS
PROT UR STRIP.AUTO-MCNC: ABNORMAL MG/DL
Q ONSET: 249 MS
QRS COUNT: 8 BEATS
QRS DURATION: 88 MS
QT INTERVAL: 603 MS
QTC CALCULATION(BAZETT): 550 MS
QTC FREDERICIA: 567 MS
R AXIS: 3 DEGREES
RBC # UR STRIP.AUTO: NEGATIVE MG/DL
RBC #/AREA URNS AUTO: ABNORMAL /HPF
SP GR UR STRIP.AUTO: 1.01
SQUAMOUS #/AREA URNS AUTO: ABNORMAL /HPF
T AXIS: 6 DEGREES
T OFFSET: 551 MS
UROBILINOGEN UR STRIP.AUTO-MCNC: NORMAL MG/DL
VENTRICULAR RATE: 50 BPM
WBC #/AREA URNS AUTO: ABNORMAL /HPF

## 2025-06-24 PROCEDURE — 99233 SBSQ HOSP IP/OBS HIGH 50: CPT | Performed by: INTERNAL MEDICINE

## 2025-06-24 PROCEDURE — 87081 CULTURE SCREEN ONLY: CPT | Mod: PORLAB | Performed by: INTERNAL MEDICINE

## 2025-06-24 PROCEDURE — 96366 THER/PROPH/DIAG IV INF ADDON: CPT | Mod: 59

## 2025-06-24 PROCEDURE — 81001 URINALYSIS AUTO W/SCOPE: CPT | Performed by: STUDENT IN AN ORGANIZED HEALTH CARE EDUCATION/TRAINING PROGRAM

## 2025-06-24 PROCEDURE — 2500000004 HC RX 250 GENERAL PHARMACY W/ HCPCS (ALT 636 FOR OP/ED): Performed by: INTERNAL MEDICINE

## 2025-06-24 PROCEDURE — G0378 HOSPITAL OBSERVATION PER HR: HCPCS

## 2025-06-24 PROCEDURE — 96372 THER/PROPH/DIAG INJ SC/IM: CPT | Mod: 59 | Performed by: NURSE PRACTITIONER

## 2025-06-24 PROCEDURE — 2500000002 HC RX 250 W HCPCS SELF ADMINISTERED DRUGS (ALT 637 FOR MEDICARE OP, ALT 636 FOR OP/ED): Performed by: NURSE PRACTITIONER

## 2025-06-24 PROCEDURE — 2500000001 HC RX 250 WO HCPCS SELF ADMINISTERED DRUGS (ALT 637 FOR MEDICARE OP): Performed by: NURSE PRACTITIONER

## 2025-06-24 PROCEDURE — 90937 HEMODIALYSIS REPEATED EVAL: CPT

## 2025-06-24 PROCEDURE — 2500000004 HC RX 250 GENERAL PHARMACY W/ HCPCS (ALT 636 FOR OP/ED): Performed by: NURSE PRACTITIONER

## 2025-06-24 RX ORDER — HEPARIN SODIUM,PORCINE/PF 10 UNIT/ML
50 SYRINGE (ML) INTRAVENOUS EVERY 12 HOURS
Status: DISCONTINUED | OUTPATIENT
Start: 2025-06-24 | End: 2025-06-25 | Stop reason: HOSPADM

## 2025-06-24 RX ORDER — HEPARIN SODIUM 1000 [USP'U]/ML
2000 INJECTION, SOLUTION INTRAVENOUS; SUBCUTANEOUS
Status: DISCONTINUED | OUTPATIENT
Start: 2025-06-24 | End: 2025-06-25 | Stop reason: HOSPADM

## 2025-06-24 RX ORDER — HEPARIN SODIUM,PORCINE/PF 10 UNIT/ML
50 SYRINGE (ML) INTRAVENOUS AS NEEDED
Status: DISCONTINUED | OUTPATIENT
Start: 2025-06-24 | End: 2025-06-25 | Stop reason: HOSPADM

## 2025-06-24 RX ORDER — HYDRALAZINE HYDROCHLORIDE 25 MG/1
1 TABLET, FILM COATED ORAL 2 TIMES DAILY
COMMUNITY

## 2025-06-24 RX ORDER — HYDRALAZINE HYDROCHLORIDE 25 MG/1
25 TABLET, FILM COATED ORAL 2 TIMES DAILY
Status: DISCONTINUED | OUTPATIENT
Start: 2025-06-24 | End: 2025-06-25 | Stop reason: HOSPADM

## 2025-06-24 RX ADMIN — AMLODIPINE BESYLATE 5 MG: 5 TABLET ORAL at 08:36

## 2025-06-24 RX ADMIN — CARVEDILOL 25 MG: 25 TABLET, FILM COATED ORAL at 08:36

## 2025-06-24 RX ADMIN — AMLODIPINE BESYLATE 5 MG: 5 TABLET ORAL at 21:11

## 2025-06-24 RX ADMIN — HEPARIN SODIUM 2000 UNITS: 1000 INJECTION, SOLUTION INTRAVENOUS; SUBCUTANEOUS at 12:49

## 2025-06-24 RX ADMIN — HEPARIN SODIUM 2000 UNITS: 1000 INJECTION, SOLUTION INTRAVENOUS; SUBCUTANEOUS at 12:48

## 2025-06-24 RX ADMIN — CARVEDILOL 25 MG: 25 TABLET, FILM COATED ORAL at 21:12

## 2025-06-24 RX ADMIN — CEFTRIAXONE 1 G: 1 INJECTION, SOLUTION INTRAVENOUS at 14:33

## 2025-06-24 RX ADMIN — LOSARTAN POTASSIUM 50 MG: 50 TABLET, FILM COATED ORAL at 21:12

## 2025-06-24 RX ADMIN — HYDRALAZINE HYDROCHLORIDE 25 MG: 25 TABLET ORAL at 21:12

## 2025-06-24 RX ADMIN — HEPARIN SODIUM 5000 UNITS: 5000 INJECTION, SOLUTION INTRAVENOUS; SUBCUTANEOUS at 14:33

## 2025-06-24 RX ADMIN — HEPARIN SODIUM 5000 UNITS: 5000 INJECTION, SOLUTION INTRAVENOUS; SUBCUTANEOUS at 21:12

## 2025-06-24 RX ADMIN — Medication 1000 MCG: at 08:36

## 2025-06-24 RX ADMIN — Medication 50 MCG: at 08:36

## 2025-06-24 RX ADMIN — HEPARIN SODIUM 5000 UNITS: 5000 INJECTION, SOLUTION INTRAVENOUS; SUBCUTANEOUS at 06:29

## 2025-06-24 RX ADMIN — LOSARTAN POTASSIUM 50 MG: 50 TABLET, FILM COATED ORAL at 08:36

## 2025-06-24 RX ADMIN — ATORVASTATIN CALCIUM 10 MG: 10 TABLET, FILM COATED ORAL at 21:12

## 2025-06-24 RX ADMIN — PIOGLITAZONE HYDROCHLORIDE 15 MG: 30 TABLET ORAL at 08:36

## 2025-06-24 ASSESSMENT — PAIN SCALES - GENERAL
PAINLEVEL_OUTOF10: 0 - NO PAIN

## 2025-06-24 ASSESSMENT — COGNITIVE AND FUNCTIONAL STATUS - GENERAL
MOBILITY SCORE: 20
WALKING IN HOSPITAL ROOM: A LITTLE
DRESSING REGULAR LOWER BODY CLOTHING: A LITTLE
STANDING UP FROM CHAIR USING ARMS: A LITTLE
DAILY ACTIVITIY SCORE: 23
CLIMB 3 TO 5 STEPS WITH RAILING: A LITTLE
MOVING TO AND FROM BED TO CHAIR: A LITTLE

## 2025-06-24 ASSESSMENT — ACTIVITIES OF DAILY LIVING (ADL): LACK_OF_TRANSPORTATION: NO

## 2025-06-24 NOTE — PROGRESS NOTES
Tana Hargrove is a 80 y.o. female admitted for Near syncope. Pharmacy reviewed the patient's [id number] medications and allergies for accuracy.    The list below reflects the PTA list prior to pharmacy medication history. A summary a changes to the PTA medication list has been listed below. Please review each medication in order reconciliation for additional clarification and justification.    Source of information:  Spoke with pt & [facility]     Medications added:  Hydralazine 25mg tab- 1 tab bid     Medications modified:    Medications to be removed:    Medications of concern:      Prior to Admission Medications   Prescriptions Last Dose Informant Patient Reported? Taking?   albuterol (Ventolin HFA) 90 mcg/actuation inhaler   No No   Sig: Inhale 2 puffs every 4 hours if needed for wheezing or shortness of breath.   allopurinol (Zyloprim) 100 mg tablet   Yes No   Sig: Take 1 tablet (100 mg) by mouth every 12 hours.   amLODIPine (Norvasc) 5 mg tablet   No No   Sig: Take 1 tablet (5 mg) by mouth 2 times a day.   atorvastatin (Lipitor) 10 mg tablet   No No   Sig: Take 1 tablet (10 mg) by mouth once daily.   carvedilol (Coreg) 25 mg tablet   No No   Sig: Take 1 tablet (25 mg) by mouth 2 times a day.   cholecalciferol (Vitamin D-3) 50 MCG (2000 UT) tablet   Yes No   Sig: Take 1 tablet (50 mcg) by mouth once daily.   cyanocobalamin (Vitamin B-12) 1,000 mcg tablet   Yes No   Sig: Take 1 tablet (1,000 mcg) by mouth once daily.   losartan (Cozaar) 50 mg tablet   No No   Sig: Take 1 tablet (50 mg) by mouth 2 times a day.   pantoprazole (ProtoNix) 40 mg EC tablet   No No   Sig: Take 1 tablet (40 mg) by mouth once daily in the morning. Take before meals. Do not crush, chew, or split.   pioglitazone (Actos) 15 mg tablet   No No   Sig: Take 1 tablet (15 mg) by mouth once daily.   pregabalin (Lyrica) 100 mg capsule   No No   Sig: Take 1 capsule (100 mg) by mouth 2 times a day.      Facility-Administered Medications:  None       DIPTI TRAVIS

## 2025-06-24 NOTE — PROGRESS NOTES
Tana Hargrove is a 80 y.o. female on day 0 of admission presenting with Near syncope.    Subjective     History Of Present Illness (HPI):  Tana Hargrove is a 80 y.o. female with PMHx s/f myelodysplastic syndrome on Procrit, end-stage renal disease on hemodialysis, type 2 diabetes, hypertension, hyperlipidemia, diastolic heart failure LVEF 64% April 2025 presenting with near syncope.  Patient has been having issues with urinary frequency she did see a primary care provider last week gave urine sample.  The patient denies any fevers chills or rigors no dysuria.  Culture seems to have come back E. coli sensitive to Rocephin.  Patient has not been taking any antibiotics.  This morning patient had gotten out of bed was sitting down ordering something on the computer suddenly became very diaphoretic felt lightheaded like she was going to pass out.  Symptoms seem to improve.  Patient was evaluated in emergency department, workup in the emergency department shows the patient to have hyperglycemia elevated creatinine of 2.90 from baseline around 2 patient had Hellen cytopenia with white blood cell count of 3.7 anemia was about baseline at 7.8.  Case was discussed with the ED provider patient is to be admitted for further evaluation and management.  The length of stay is not expected to exceed 2 midnights.     ED Course:   1. Vitals on presentation: T 35.8 °C (96.4 °F)  HR 50  /55  RR 18  O2 95 % None (Room air)  2. Labs:   a. CBC with WBC 3.7, Hgb 7.8, Plts 202.   b. CMP with glucose 206, Na 138, K 4.0, BUN 34, sCr 2.90, alk phos 75, ALT 16, AST 19, bilirubin 1.1. Magnesium 1.95.   c. . Trop 12.   d. Lactate 1.1  3. EKG: Sinus bradycardia 50 bpm QTc interval is prolonged 550 ms no ST segment changes to suggest acute ischemia  4. Imaging - agree with radiology interpretation(s):   5. Interventions: Referred for admission     12-point ROS reviewed and found to be negative aside from aforementioned positives  "in HPI and/or noted in dedicated ROS section below.       Medical History[1]   Surgical History[2]   Social History[3]   Family History[4]   Allergies[5]  Medications: See full med list     6/24: Patient well-known to me in the past.  Suspect most of her symptoms are secondary to UTI.    Objective     Physical Exam    Constitutional: Feeling a little better but weak    Head/Facial: Sun and age-related changes slight paleness of the skin    Neck: Supple    Lungs: Clear to auscultation    Heart: Regular heart rate and frankie very soft systolic murmur    Abdomen: Nontender    Pelvis/: No flank tenderness    Extremities: No gross edema    Neurologic: No focal neurologic deficits       Psychiatric/Behavioral: Patient is pleasant and appropriate    Last Recorded Vitals  Blood pressure 148/60, pulse 65, temperature 36.6 °C (97.9 °F), temperature source Temporal, resp. rate 17, height 1.549 m (5' 1\"), weight 67.9 kg (149 lb 11.2 oz), SpO2 97%.  Intake/Output last 3 Shifts:  I/O last 3 completed shifts:  In: 50 (0.7 mL/kg) [IV Piggyback:50]  Out: - (0 mL/kg)   Weight: 67.9 kg     Relevant Results  Results for orders placed or performed during the hospital encounter of 06/23/25 (from the past 24 hours)   ECG 12 lead   Result Value Ref Range    Ventricular Rate 50 BPM    Atrial Rate 50 BPM    UT Interval 162 ms    QRS Duration 88 ms    QT Interval 603 ms    QTC Calculation(Bazett) 550 ms    P Axis 15 degrees    R Axis 3 degrees    T Axis 6 degrees    QRS Count 8 beats    Q Onset 249 ms    T Offset 551 ms    QTC Fredericia 567 ms   CBC and Auto Differential   Result Value Ref Range    WBC 3.7 (L) 4.4 - 11.3 x10*3/uL    nRBC 0.5 (H) 0.0 - 0.0 /100 WBCs    RBC 2.60 (L) 4.00 - 5.20 x10*6/uL    Hemoglobin 7.8 (L) 12.0 - 16.0 g/dL    Hematocrit 24.7 (L) 36.0 - 46.0 %    MCV 95 80 - 100 fL    MCH 30.0 26.0 - 34.0 pg    MCHC 31.6 (L) 32.0 - 36.0 g/dL    RDW 21.1 (H) 11.5 - 14.5 %    Platelets 202 150 - 450 x10*3/uL    Neutrophils % " 65.2 40.0 - 80.0 %    Immature Granulocytes %, Automated 0.5 0.0 - 0.9 %    Lymphocytes % 22.3 13.0 - 44.0 %    Monocytes % 8.3 2.0 - 10.0 %    Eosinophils % 2.9 0.0 - 6.0 %    Basophils % 0.8 0.0 - 2.0 %    Neutrophils Absolute 2.43 1.60 - 5.50 x10*3/uL    Immature Granulocytes Absolute, Automated 0.02 0.00 - 0.50 x10*3/uL    Lymphocytes Absolute 0.83 0.80 - 3.00 x10*3/uL    Monocytes Absolute 0.31 0.05 - 0.80 x10*3/uL    Eosinophils Absolute 0.11 0.00 - 0.40 x10*3/uL    Basophils Absolute 0.03 0.00 - 0.10 x10*3/uL   Phosphorus   Result Value Ref Range    Phosphorus 4.4 2.5 - 4.9 mg/dL   Magnesium   Result Value Ref Range    Magnesium 1.95 1.60 - 2.40 mg/dL   Comprehensive metabolic panel   Result Value Ref Range    Glucose 206 (H) 74 - 99 mg/dL    Sodium 138 136 - 145 mmol/L    Potassium 4.0 3.5 - 5.3 mmol/L    Chloride 106 98 - 107 mmol/L    Bicarbonate 26 21 - 32 mmol/L    Anion Gap 10 10 - 20 mmol/L    Urea Nitrogen 34 (H) 6 - 23 mg/dL    Creatinine 2.90 (H) 0.50 - 1.05 mg/dL    eGFR 16 (L) >60 mL/min/1.73m*2    Calcium 9.3 8.6 - 10.3 mg/dL    Albumin 3.9 3.4 - 5.0 g/dL    Alkaline Phosphatase 75 33 - 136 U/L    Total Protein 6.4 6.4 - 8.2 g/dL    AST 19 9 - 39 U/L    Bilirubin, Total 1.1 0.0 - 1.2 mg/dL    ALT 16 7 - 45 U/L   Protime-INR   Result Value Ref Range    Protime 12.1 9.8 - 12.4 seconds    INR 1.1 0.9 - 1.1   aPTT   Result Value Ref Range    aPTT 24 (L) 26 - 36 seconds   B-Type Natriuretic Peptide   Result Value Ref Range     (H) 0 - 99 pg/mL   Type And Screen   Result Value Ref Range    ABO TYPE A     Rh TYPE POS     ANTIBODY SCREEN NEG    Blood Gas Venous Full Panel   Result Value Ref Range    POCT pH, Venous 7.37 7.33 - 7.43 pH    POCT pCO2, Venous 49 41 - 51 mm Hg    POCT pO2, Venous 38 35 - 45 mm Hg    POCT SO2, Venous 49 45 - 75 %    POCT Oxy Hemoglobin, Venous 48.0 45.0 - 75.0 %    POCT Hematocrit Calculated, Venous 24.0 (L) 36.0 - 46.0 %    POCT Sodium, Venous 140 136 - 145 mmol/L     POCT Potassium, Venous 4.1 3.5 - 5.3 mmol/L    POCT Chloride, Venous 107 98 - 107 mmol/L    POCT Ionized Calicum, Venous 1.26 1.10 - 1.33 mmol/L    POCT Glucose, Venous 215 (H) 74 - 99 mg/dL    POCT Lactate, Venous 1.8 0.4 - 2.0 mmol/L    POCT Base Excess, Venous 2.6 -2.0 - 3.0 mmol/L    POCT HCO3 Calculated, Venous 28.3 (H) 22.0 - 26.0 mmol/L    POCT Hemoglobin, Venous 8.0 (L) 12.0 - 16.0 g/dL    POCT Anion Gap, Venous 9.0 (L) 10.0 - 25.0 mmol/L    Patient Temperature 37.0 degrees Celsius    FiO2 21 %   Troponin I, High Sensitivity, Initial   Result Value Ref Range    Troponin I, High Sensitivity 13 0 - 13 ng/L   Troponin, High Sensitivity, 1 Hour   Result Value Ref Range    Troponin I, High Sensitivity 12 0 - 13 ng/L   TSH with reflex to Free T4 if abnormal   Result Value Ref Range    Thyroid Stimulating Hormone 1.24 0.44 - 3.98 mIU/L   Blood Culture    Specimen: Peripheral Venipuncture; Blood culture   Result Value Ref Range    Blood Culture Loaded on Instrument - Culture in progress    Urinalysis with Reflex Culture and Microscopic   Result Value Ref Range    Color, Urine Light-Yellow Light-Yellow, Yellow, Dark-Yellow    Appearance, Urine Clear Clear    Specific Gravity, Urine 1.007 1.005 - 1.035    pH, Urine 6.5 5.0, 5.5, 6.0, 6.5, 7.0, 7.5, 8.0    Protein, Urine 100 (2+) (A) NEGATIVE, 10 (TRACE), 20 (TRACE) mg/dL    Glucose, Urine Normal Normal mg/dL    Blood, Urine NEGATIVE NEGATIVE mg/dL    Ketones, Urine NEGATIVE NEGATIVE mg/dL    Bilirubin, Urine NEGATIVE NEGATIVE mg/dL    Urobilinogen, Urine Normal Normal mg/dL    Nitrite, Urine NEGATIVE NEGATIVE    Leukocyte Esterase, Urine NEGATIVE NEGATIVE   Urinalysis Microscopic   Result Value Ref Range    WBC, Urine 1-5 1-5, NONE /HPF    RBC, Urine 1-2 NONE, 1-2, 3-5 /HPF    Squamous Epithelial Cells, Urine 1-9 (SPARSE) Reference range not established. /HPF    Bacteria, Urine 1+ (A) NONE SEEN /HPF     *Note: Due to a large number of results and/or encounters for the  requested time period, some results have not been displayed. A complete set of results can be found in Results Review.      Medications  Scheduled Medications[6]   Continuous Medications[7]   PRN Medications[8]                   Assessment/Plan    Near syncope  E.coli UTI  Suspect metabolic encephalopathy secondary to above  Sinus bradycardia  ESRD on HD  DM-II  HLD  HTN  Chronic diastolic heart failure LVEF 64% (4/25)  Myelodysplastic syndrome- on Procrit    Patient on high doses of pregabalin, never bothered her before  Slight elevation in Scr and generally weak  Held a dose of pregabalin yesterday, consider restarting at lower dose  Rocephin 1 g IV daily  Repeat UA and blood cultures  Sinus bradycardia on ECG  Monitor on telemetry  Carvedilol 25 mg BID, hold if HR <60  Anemic but appears to be at baseline?  HD MWF, missed yesterday  Nephrology consulted   Continue low dose Actos 15 mg   Lipitor 10 mg p.o. nightly  Amlodipine 5 mg BID   Losartan 50 mg p.o. BID  Volume management per dialysis   Vitamin D3, Vitamin B12 tablets daily  Heparin intra-catheter after dialysis   Protonix 40 mg daily   Miralax daily   Continue Tylenol, Dulcolax, Mucinex, melatonin, Zofran PRN  SW and TCC consulted   Continue to monitor  See orders for complete plan           BRAD DUNN  Shared visit with student  Patient seen and examined  Note amended and addended  See my orders for complete plan       [1]   Past Medical History:  Diagnosis Date    Abnormal levels of other serum enzymes     Elevated liver enzymes    Acute kidney failure     Anemia     CKD (chronic kidney disease)     COPD (chronic obstructive pulmonary disease) (Multi)     Coronary artery disease     Disease of blood and blood-forming organs, unspecified     Bone marrow disorder    HLD (hyperlipidemia)     Hypertension     MDS (myelodysplastic syndrome) (Multi)     Personal history of other diseases of the musculoskeletal system and connective tissue     History of  "muscle pain    Personal history of other specified conditions     History of insomnia    Personal history of other specified conditions     History of edema    Type 2 diabetes mellitus     Urinary tract infection, site not specified 10/17/2024   [2]   Past Surgical History:  Procedure Laterality Date    APPENDECTOMY      BREAST BIOPSY Left     left excisional    CHOLECYSTECTOMY  06/06/2024    partial cholecystectomy    COLONOSCOPY      HYSTERECTOMY      JOINT REPLACEMENT      MR HEAD ANGIO WO IV CONTRAST  11/20/2020    MR HEAD ANGIO WO IV CONTRAST 11/20/2020 [facility]    MR NECK ANGIO WO IV CONTRAST  11/20/2020    MR NECK ANGIO WO IV CONTRAST 11/20/2020 [facility]    OOPHORECTOMY      OTHER SURGICAL HISTORY  12/13/2019    Oophorectomy bilateral    OTHER SURGICAL HISTORY  12/13/2019    Tubal ligation    OTHER SURGICAL HISTORY Bilateral 12/13/2019    Knee replacement    OTHER SURGICAL HISTORY Left 12/13/2019    Shoulder surgery    OTHER SURGICAL HISTORY  12/13/2019    Hysterectomy    OTHER SURGICAL HISTORY  12/13/2019    Lumpectomy    OTHER SURGICAL HISTORY Right 12/13/2019    Foot surgery    OTHER SURGICAL HISTORY  04/16/2021    Back surgery   [3]   Social History  Tobacco Use    Smoking status: Never     Passive exposure: Never    Smokeless tobacco: Never   Vaping Use    Vaping status: Never Used   Substance Use Topics    Alcohol use: Not Currently    Drug use: Never   [4]   Family History  Problem Relation Name Age of Onset    Heart disease Mother      Heart attack Father      Hodgkin's lymphoma Son     [5]   Allergies  Allergen Reactions    Codeine Unknown and Other     \"Feel like I'm floating\"    Could not speak did not feel good    lightheaded    Hydrocodone Other    Hydrocodone-Acetaminophen Unknown     \"I went into shock\" \" States she didn't know where she was after taking 1 dose.    Other reaction(s): Other (See Comments), Other: See Comments, shock   Went into shock   \"I went into shock\" " "\" States she didn't know where she was after taking 1 dose.    Went into shock    Tramadol Other, Unknown and Itching    Piperacillin-Tazobactam Diarrhea and Headache    Adhesive Tape-Silicones Other    Meperidine Nausea And Vomiting and Unknown     Could talk did not feel well   [6] amLODIPine, 5 mg, oral, BID  atorvastatin, 10 mg, oral, Nightly  carvedilol, 25 mg, oral, BID  cefTRIAXone, 1 g, intravenous, q24h  cholecalciferol, 50 mcg, oral, Daily  cyanocobalamin, 1,000 mcg, oral, Daily  heparin (porcine), 5,000 Units, subcutaneous, q8h JOSE DE JESUS  heparin, 2,000 Units, intra-catheter, After Dialysis  losartan, 50 mg, oral, BID  pantoprazole, 40 mg, oral, Daily before breakfast   Or  pantoprazole, 40 mg, intravenous, Daily before breakfast  pioglitazone, 15 mg, oral, Daily  polyethylene glycol, 17 g, oral, Daily  [Held by provider] pregabalin, 50 mg, oral, Daily     [7]    [8] PRN medications: acetaminophen, bisacodyl, guaiFENesin, melatonin, ondansetron **OR** ondansetron    "

## 2025-06-24 NOTE — CARE PLAN
The patient's goals for the shift include  safety    The clinical goals for the shift include patient will remain free from injury and falls, verbalize any change in weakness and dizzy      Problem: Skin  Goal: Decreased wound size/increased tissue granulation at next dressing change  Outcome: Progressing  Goal: Participates in plan/prevention/treatment measures  Outcome: Progressing  Goal: Prevent/manage excess moisture  Outcome: Progressing  Goal: Prevent/minimize sheer/friction injuries  Outcome: Progressing  Goal: Promote/optimize nutrition  Outcome: Progressing  Goal: Promote skin healing  Outcome: Progressing  Flowsheets (Taken 6/24/2025 0256)  Promote skin healing: Turn/reposition every 2 hours/use positioning/transfer devices     Problem: Pain - Adult  Goal: Verbalizes/displays adequate comfort level or baseline comfort level  Outcome: Progressing     Problem: Safety - Adult  Goal: Free from fall injury  Outcome: Progressing     Problem: Discharge Planning  Goal: Discharge to home or other facility with appropriate resources  Outcome: Progressing     Problem: Chronic Conditions and Co-morbidities  Goal: Patient's chronic conditions and co-morbidity symptoms are monitored and maintained or improved  Outcome: Progressing     Problem: Nutrition  Goal: Nutrient intake appropriate for maintaining nutritional needs  Outcome: Progressing

## 2025-06-24 NOTE — PROGRESS NOTES
Occupational Therapy                 Therapy Communication Note    Patient Name: Tana Hargrove  MRN: [id number]  Department: [facility] DIALYSIS  Room: 231/2311-B  Today's Date: 6/24/2025     Discipline: Occupational Therapy    Missed Visit:       Missed Visit Reason:  off floor for dialysis    Missed Time: Attempt    Comment:

## 2025-06-24 NOTE — NURSING NOTE
End of shift note:  Pt received dialysis today and no fluid was taken off. Staph aureus/MRSA swab performed and awaiting results. Otherwise, pt rested with no complaints.

## 2025-06-24 NOTE — PROGRESS NOTES
Physical Therapy                 Therapy Communication Note    Patient Name: Tana Hargrove  MRN: [id number]  Department: [facility] DIALYSIS  Room: 231/2311-B  Today's Date: 6/24/2025     Discipline: Physical Therapy    Missed Visit: PT Missed Visit: Yes     Missed Visit Reason: Missed Visit Reason: Patient in a medical procedure (at dialysis)    Missed Time: Attempt    Comment:

## 2025-06-24 NOTE — NURSING NOTE
Patient progress throughout shift, remained free from injury and falls, not increase in dizzy and weakness, no attempt of out of bed, gathered urine for labs as requested.

## 2025-06-24 NOTE — CONSULTS
Nephrology Consult Note                                                                                                                                         Inpatient consult to Nephrology  Consult performed by: Pinky Christie DO  Consult ordered by: Jose Miguel Armendariz, APRN-CNP                                                                                                               HPI  Patient is a 80 y.o. female well-known to me who is admitted to hospital with complaints of   near syncope.  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   I arrange for dialysis and saw her on dialysis this morning.  She did miss her treatment yesterday at [facility].  She tolerated dialysis well.  She remembers the incident that happened at home where she was sitting in the chair and became very weak.  She could not move her arm to press her necklace to call EMS.  It took her about an hour to get to the point where she could call.  Patient recently turned any urine culture for PCP and it was growing E. coli.  She received treatment in the ED not as an outpatient.  Medical History[1]   Social History     Socioeconomic History    Marital status:      Spouse name: Not on file    Number of children: Not on file    Years of education: Not on file    Highest education level: Not on file   Occupational History    Not on file   Tobacco Use    Smoking status: Never     Passive exposure: Never    Smokeless tobacco: Never   Vaping Use    Vaping status: Never Used   Substance and Sexual Activity    Alcohol use: Not Currently    Drug use: Never    Sexual activity: Not Currently   Other Topics Concern    Not on file   Social History Narrative    Not on file     Social Drivers of Health     Financial Resource Strain: Low  "Risk  (6/24/2025)    Overall Financial Resource Strain (CARDIA)     Difficulty of Paying Living Expenses: Not hard at all   Food Insecurity: No Food Insecurity (6/23/2025)    Hunger Vital Sign     Worried About Running Out of Food in the Last Year: Never true     Ran Out of Food in the Last Year: Never true   Transportation Needs: No Transportation Needs (6/24/2025)    PRAPARE - Transportation     Lack of Transportation (Medical): No     Lack of Transportation (Non-Medical): No   Physical Activity: Inactive (10/9/2024)    Exercise Vital Sign     Days of Exercise per Week: 0 days     Minutes of Exercise per Session: 0 min   Stress: No Stress Concern Present (10/9/2024)    [employer] [place] [employer] - Occupational Stress Questionnaire     Feeling of Stress : Only a little   Social Connections: Feeling Socially Integrated (10/17/2024)    OASIS : Social Isolation     Frequency of experiencing loneliness or isolation: Never   Intimate Partner Violence: Not At Risk (6/23/2025)    Humiliation, Afraid, Rape, and Kick questionnaire     Fear of Current or Ex-Partner: No     Emotionally Abused: No     Physically Abused: No     Sexually Abused: No   Housing Stability: Low Risk  (6/24/2025)    Housing Stability Vital Sign     Unable to Pay for Housing in the Last Year: No     Number of Times Moved in the Last Year: 0     Homeless in the Last Year: No      Family History[2]   Medications Ordered Prior to Encounter[3]   Scheduled medications  Scheduled Medications[4]  Continuous medications  Continuous Medications[5]  PRN medications  PRN Medications[6]     Review of systems  as per HPI otherwise 10 point review systems negative    /73   Pulse 69   Temp 36.2 °C (97.1 °F) (Temporal)   Resp 17   Ht 1.549 m (5' 0.98\")   Wt 67.9 kg (149 lb 11.2 oz)   SpO2 97%   BMI 28.30 kg/m²     Input / Output:  24 HR:   Intake/Output Summary (Last 24 hours) at 6/24/2025 1447  Last data filed at 6/24/2025 1256  Gross " per 24 hour   Intake 1368 ml   Output 402 ml   Net 966 ml       Physical Exam   Alert and oriented x 3, NAD  EOMI  OP clear  Neck: supple, No JVD  Right IJ TDC  CV: RRR without m/r/g  Lungs: CTA bilaterally  Abd: soft NT/ND +BS  Ext: no lower extremity edema   Neuro: grossly intact  Skin: no rashes    Results from last 7 days   Lab Units 06/23/25  1109   SODIUM mmol/L 138   POTASSIUM mmol/L 4.0   CHLORIDE mmol/L 106   CO2 mmol/L 26   BUN mg/dL 34*   CREATININE mg/dL 2.90*   GLUCOSE mg/dL 206*   CALCIUM mg/dL 9.3        Results from last 7 days   Lab Units 06/23/25  1109   SODIUM mmol/L 138   POTASSIUM mmol/L 4.0   CHLORIDE mmol/L 106   CO2 mmol/L 26   BUN mg/dL 34*   CREATININE mg/dL 2.90*   CALCIUM mg/dL 9.3   PROTEIN TOTAL g/dL 6.4   BILIRUBIN TOTAL mg/dL 1.1   ALK PHOS U/L 75   ALT U/L 16   AST U/L 19   GLUCOSE mg/dL 206*      Results from last 7 days   Lab Units 06/23/25  1109   MAGNESIUM mg/dL 1.95      Results from last 7 days   Lab Units 06/23/25  1109   WBC AUTO x10*3/uL 3.7*   HEMOGLOBIN g/dL 7.8*   HEMATOCRIT % 24.7*   PLATELETS AUTO x10*3/uL 202        No orders to display        Assessment:     Patient is 80 y.o. female who is admitted to hospital for acute on chronic HFpEF. Nephrology consulted in view of ESRD.     ESRD  - HD [facility] on Monday Wednesday Friday  -Access is right IJ TDC  - Patient is compliant with dialysis treatment but did miss treatment due to being sick day of admission     Anemia of ESRD and MDS  - Requires frequent PRBCs transfusion and is on high-dose MARILYNN with dialysis  - Transfuse when symptomatic or when hemoglobin less than 7     CKD-MBD  -Patient not currently on a phosphorus binder  -Calcium is normal  DM2    Presyncope  -?  Lyrica related        Recommendations:   - Seen on HD today, patient will dialyze short treatment tomorrow on her regular day to get back on her chronic schedule  - Noted Lyrica is high dosing for ESRD    Please message me through EPIC chat with any  questions or concerns.     Pinky Christie DO  6/24/2025  2:47 PM         [facility] [place]    [address], [address]   [place] [address]  Office: [phone]                    [1]   Past Medical History:  Diagnosis Date    Abnormal levels of other serum enzymes     Elevated liver enzymes    Acute kidney failure     Anemia     CKD (chronic kidney disease)     COPD (chronic obstructive pulmonary disease) (Multi)     Coronary artery disease     Disease of blood and blood-forming organs, unspecified     Bone marrow disorder    HLD (hyperlipidemia)     Hypertension     MDS (myelodysplastic syndrome) (Multi)     Personal history of other diseases of the musculoskeletal system and connective tissue     History of muscle pain    Personal history of other specified conditions     History of insomnia    Personal history of other specified conditions     History of edema    Type 2 diabetes mellitus     Urinary tract infection, site not specified 10/17/2024   [2]   Family History  Problem Relation Name Age of Onset    Heart disease Mother      Heart attack Father      Hodgkin's lymphoma Son     [3]   No current facility-administered medications on file prior to encounter.     Current Outpatient Medications on File Prior to Encounter   Medication Sig Dispense Refill    albuterol (Ventolin HFA) 90 mcg/actuation inhaler Inhale 2 puffs every 4 hours if needed for wheezing or shortness of breath. 8 g 1    allopurinol (Zyloprim) 100 mg tablet Take 1 tablet (100 mg) by mouth every 12 hours.      amLODIPine (Norvasc) 5 mg tablet Take 1 tablet (5 mg) by mouth 2 times a day. 60 tablet 3    atorvastatin (Lipitor) 10 mg tablet Take 1 tablet (10 mg) by mouth once daily. 90 tablet 1    carvedilol (Coreg) 25 mg tablet Take 1 tablet (25 mg) by mouth 2 times a day. 180 tablet 1    cholecalciferol (Vitamin D-3) 50 MCG (2000 UT) tablet Take 1 tablet (50 mcg) by mouth once daily.      cyanocobalamin (Vitamin B-12) 1,000  mcg tablet Take 1 tablet (1,000 mcg) by mouth once daily.      losartan (Cozaar) 50 mg tablet Take 1 tablet (50 mg) by mouth 2 times a day. 180 tablet 1    pantoprazole (ProtoNix) 40 mg EC tablet Take 1 tablet (40 mg) by mouth once daily in the morning. Take before meals. Do not crush, chew, or split. 30 tablet 2    pioglitazone (Actos) 15 mg tablet Take 1 tablet (15 mg) by mouth once daily. 90 tablet 1    pregabalin (Lyrica) 100 mg capsule Take 1 capsule (100 mg) by mouth 2 times a day. 60 capsule 0    [DISCONTINUED] albuterol sulfate (Proair Digihaler) 90 mcg/actuation aero powdr breath act w/sensor inhaler Inhale 2 puffs every 6 hours if needed for wheezing or shortness of breath.      [DISCONTINUED] amLODIPine (Norvasc) 10 mg tablet Take 1 tablet (10 mg) by mouth once daily.     [4] amLODIPine, 5 mg, oral, BID  atorvastatin, 10 mg, oral, Nightly  carvedilol, 25 mg, oral, BID  cefTRIAXone, 1 g, intravenous, q24h  cholecalciferol, 50 mcg, oral, Daily  cyanocobalamin, 1,000 mcg, oral, Daily  heparin (porcine), 5,000 Units, subcutaneous, q8h JOSE DE JESUS  heparin, 2,000 Units, intra-catheter, After Dialysis  heparin, 2,000 Units, intra-catheter, After Dialysis  heparin flush, 50 Units, intra-catheter, q12h  losartan, 50 mg, oral, BID  pantoprazole, 40 mg, oral, Daily before breakfast   Or  pantoprazole, 40 mg, intravenous, Daily before breakfast  pioglitazone, 15 mg, oral, Daily  polyethylene glycol, 17 g, oral, Daily  [Held by provider] pregabalin, 50 mg, oral, Daily  [5]    [6] PRN medications: acetaminophen, alteplase, bisacodyl, guaiFENesin, heparin flush, melatonin, ondansetron **OR** ondansetron

## 2025-06-24 NOTE — CARE PLAN
The patient's goals for the shift include  comfort    The clinical goals for the shift include ppt will remain HDS this shift

## 2025-06-24 NOTE — POST-PROCEDURE NOTE
Report to Receiving RN:    Report To: ELISABETH THROCKMORTON  Time Report Called: 1250  Hand-Off Communication: NO ISSUES ,NO FLUID REMOVED ,LAST /90(69)  Complications During Treatment: No  Ultrafiltration Treatment: No  Medications Administered During Dialysis: No  Blood Products Administered During Dialysis: No  Labs Sent During Dialysis: No  Heparin Drip Rate Changes: No  Dialysis Catheter Dressing: CLEAN AND DRY  Last Dressing Change: 6/24/25    Electronic Signatures:  FRANKI OCDT    Last Updated: 1:07 PM by VINCENT AVALOS

## 2025-06-24 NOTE — PROGRESS NOTES
06/24/25 1437   Discharge Planning   Living Arrangements Alone   Support Systems Children   Assistance Needed Independent with Walker   Type of Residence Private residence   Home or Post Acute Services In home services   Type of Home Care Services Home nursing visits;Home OT;Home PT   Expected Discharge Disposition Home H   Does the patient need discharge transport arranged? Yes   RoundTrip coordination needed? Yes   Has discharge transport been arranged? No   Financial Resource Strain   How hard is it for you to pay for the very basics like food, housing, medical care, and heating? Not hard   Housing Stability   In the last 12 months, was there a time when you were not able to pay the mortgage or rent on time? N   At any time in the past 12 months, were you homeless or living in a shelter (including now)? N   Transportation Needs   In the past 12 months, has lack of transportation kept you from medical appointments or from getting medications? no   In the past 12 months, has lack of transportation kept you from meetings, work, or from getting things needed for daily living? No     PCP is Carlos Higgins MD. Patient is from home alone with support from her daughter. She will be moving in with her daughter on Saturday to have more support at home. She is independent with he walker, bathing. Her daughter assists when needed and provides transportation. [facility]. PT/ OT pending. Patient is open to HHC if recommended and has used [facility] in the past. She is not open to SNF placement at this time. TCC to follow.

## 2025-06-24 NOTE — PROGRESS NOTES
Social work consult placed for discharge planning. SW reviewed pt's chart and communicated with TCC. No SW needs foreseen at this time. SW signing off; available upon request.    Luc Bland, MSW, LSW ([id number])   Care Transitions

## 2025-06-24 NOTE — PRE-PROCEDURE NOTE
Report from Sending RN:    Report From: ELISABETH THROCKMORTON  Recent Surgery of Procedure: No  Baseline Level of Consciousness (LOC): X3  Oxygen Use: No  Type: RA  Diabetic: Yes  Last BP Med Given Day of Dialysis: SEE EMAR  Last Pain Med Given: SEE EMAR  Lab Tests to be Obtained with Dialysis: No  Blood Transfusion to be Given During Dialysis: No  Available IV Access: Yes  Medications to be Administered During Dialysis: No  Continuous IV Infusion Running: No  Restraints on Currently or in the Last 24 Hours: No  Hand-Off Communication: PT IS EATING BREAKFAST, NO ISSUES   Dialysis Catheter Dressing: NA  Last Dressing Change: NA

## 2025-06-25 ENCOUNTER — APPOINTMENT (OUTPATIENT)
Dept: PRIMARY CARE | Facility: CLINIC | Age: 80
End: 2025-06-25
Payer: MEDICARE

## 2025-06-25 ENCOUNTER — PHARMACY VISIT (OUTPATIENT)
Dept: PHARMACY | Facility: CLINIC | Age: 80
End: 2025-06-25
Payer: COMMERCIAL

## 2025-06-25 VITALS
HEIGHT: 61 IN | HEART RATE: 62 BPM | OXYGEN SATURATION: 94 % | WEIGHT: 149.7 LBS | RESPIRATION RATE: 18 BRPM | DIASTOLIC BLOOD PRESSURE: 69 MMHG | BODY MASS INDEX: 28.26 KG/M2 | SYSTOLIC BLOOD PRESSURE: 170 MMHG | TEMPERATURE: 97 F

## 2025-06-25 LAB
HBV SURFACE AB SER-ACNC: 149.7 MIU/ML
HBV SURFACE AG SERPL QL IA: NONREACTIVE
HOLD SPECIMEN: NORMAL

## 2025-06-25 PROCEDURE — 2500000001 HC RX 250 WO HCPCS SELF ADMINISTERED DRUGS (ALT 637 FOR MEDICARE OP): Performed by: NURSE PRACTITIONER

## 2025-06-25 PROCEDURE — 2500000002 HC RX 250 W HCPCS SELF ADMINISTERED DRUGS (ALT 637 FOR MEDICARE OP, ALT 636 FOR OP/ED): Performed by: NURSE PRACTITIONER

## 2025-06-25 PROCEDURE — 86706 HEP B SURFACE ANTIBODY: CPT | Mod: PORLAB | Performed by: INTERNAL MEDICINE

## 2025-06-25 PROCEDURE — 2500000004 HC RX 250 GENERAL PHARMACY W/ HCPCS (ALT 636 FOR OP/ED): Performed by: INTERNAL MEDICINE

## 2025-06-25 PROCEDURE — RXMED WILLOW AMBULATORY MEDICATION CHARGE

## 2025-06-25 PROCEDURE — 36415 COLL VENOUS BLD VENIPUNCTURE: CPT | Performed by: INTERNAL MEDICINE

## 2025-06-25 PROCEDURE — G0378 HOSPITAL OBSERVATION PER HR: HCPCS

## 2025-06-25 PROCEDURE — 87340 HEPATITIS B SURFACE AG IA: CPT | Mod: PORLAB | Performed by: INTERNAL MEDICINE

## 2025-06-25 PROCEDURE — 99239 HOSP IP/OBS DSCHRG MGMT >30: CPT | Performed by: INTERNAL MEDICINE

## 2025-06-25 PROCEDURE — 90937 HEMODIALYSIS REPEATED EVAL: CPT

## 2025-06-25 RX ORDER — CEFDINIR 300 MG/1
300 CAPSULE ORAL DAILY
Status: DISCONTINUED | OUTPATIENT
Start: 2025-06-25 | End: 2025-07-01

## 2025-06-25 RX ORDER — ACETAMINOPHEN 325 MG/1
650 TABLET ORAL EVERY 4 HOURS PRN
Qty: 30 TABLET | Refills: 0 | Status: SHIPPED | OUTPATIENT
Start: 2025-06-25

## 2025-06-25 RX ORDER — ALBUTEROL SULFATE 90 UG/1
2 INHALANT RESPIRATORY (INHALATION) EVERY 6 HOURS PRN
Qty: 8.5 G | Refills: 2 | Status: SHIPPED | OUTPATIENT
Start: 2025-06-25 | End: 2025-07-01 | Stop reason: WASHOUT

## 2025-06-25 RX ADMIN — HYDRALAZINE HYDROCHLORIDE 25 MG: 25 TABLET ORAL at 08:08

## 2025-06-25 RX ADMIN — PIOGLITAZONE HYDROCHLORIDE 15 MG: 30 TABLET ORAL at 08:08

## 2025-06-25 RX ADMIN — CARVEDILOL 25 MG: 25 TABLET, FILM COATED ORAL at 08:07

## 2025-06-25 RX ADMIN — Medication 50 MCG: at 08:07

## 2025-06-25 RX ADMIN — Medication 1000 MCG: at 08:07

## 2025-06-25 RX ADMIN — HEPARIN SODIUM 2000 UNITS: 1000 INJECTION, SOLUTION INTRAVENOUS; SUBCUTANEOUS at 10:39

## 2025-06-25 RX ADMIN — AMLODIPINE BESYLATE 5 MG: 5 TABLET ORAL at 08:08

## 2025-06-25 RX ADMIN — ACETAMINOPHEN 650 MG: 325 TABLET ORAL at 11:47

## 2025-06-25 RX ADMIN — LOSARTAN POTASSIUM 50 MG: 50 TABLET, FILM COATED ORAL at 08:08

## 2025-06-25 ASSESSMENT — PAIN - FUNCTIONAL ASSESSMENT
PAIN_FUNCTIONAL_ASSESSMENT: NO/DENIES PAIN
PAIN_FUNCTIONAL_ASSESSMENT: NO/DENIES PAIN
PAIN_FUNCTIONAL_ASSESSMENT: 0-10
PAIN_FUNCTIONAL_ASSESSMENT: 0-10

## 2025-06-25 ASSESSMENT — COGNITIVE AND FUNCTIONAL STATUS - GENERAL
MOBILITY SCORE: 20
PERSONAL GROOMING: A LITTLE
DRESSING REGULAR LOWER BODY CLOTHING: A LITTLE
STANDING UP FROM CHAIR USING ARMS: A LITTLE
DAILY ACTIVITIY SCORE: 20
CLIMB 3 TO 5 STEPS WITH RAILING: A LITTLE
DRESSING REGULAR UPPER BODY CLOTHING: A LITTLE
WALKING IN HOSPITAL ROOM: A LITTLE
MOVING TO AND FROM BED TO CHAIR: A LITTLE
TOILETING: A LITTLE

## 2025-06-25 ASSESSMENT — PAIN SCALES - GENERAL
PAINLEVEL_OUTOF10: 0 - NO PAIN
PAINLEVEL_OUTOF10: 0 - NO PAIN
PAINLEVEL_OUTOF10: 3
PAINLEVEL_OUTOF10: 0 - NO PAIN

## 2025-06-25 ASSESSMENT — PAIN DESCRIPTION - LOCATION: LOCATION: HEAD

## 2025-06-25 NOTE — DISCHARGE SUMMARY
Discharge Diagnosis    Near syncope  E.coli UTI  Metabolic encephalopathy secondary to above resolved  Sinus bradycardia-improved  ESRD on HD  DM-II  HLD  HTN  Chronic diastolic heart failure LVEF 64% (4/25)  Myelodysplastic syndrome- on Procrit           Issues Requiring Follow-Up  See after visit summary for complete plan okay to discharge home.    Discharge Meds     Medication List      START taking these medications     acetaminophen 325 mg tablet; Commonly known as: Tylenol; Take 2 tablets   (650 mg) by mouth every 4 hours if needed for moderate pain (4 - 6), mild   pain (1 - 3), headaches or fever (temp greater than 38.0 C).     CHANGE how you take these medications     * albuterol 90 mcg/actuation inhaler; Commonly known as: Ventolin HFA;   Inhale 2 puffs every 4 hours if needed for wheezing or shortness of   breath.; What changed: Another medication with the same name was added.   Make sure you understand how and when to take each.   * albuterol 90 mcg/actuation inhaler; Commonly known as: Ventolin HFA;   Inhale 2 puffs by mouth into the lungs every 6 hours if needed for   wheezing or shortness of breath.; What changed: You were already taking a   medication with the same name, and this prescription was added. Make sure   you understand how and when to take each.  * This list has 2 medication(s) that are the same as other medications   prescribed for you. Read the directions carefully, and ask your doctor or   other care provider to review them with you.     CONTINUE taking these medications     allopurinol 100 mg tablet; Commonly known as: Zyloprim   amLODIPine 5 mg tablet; Commonly known as: Norvasc; Take 1 tablet (5 mg)   by mouth 2 times a day.   atorvastatin 10 mg tablet; Commonly known as: Lipitor; Take 1 tablet (10   mg) by mouth once daily.   carvedilol 25 mg tablet; Commonly known as: Coreg; Take 1 tablet (25 mg)   by mouth 2 times a day.   cholecalciferol 50 mcg (2,000 units) tablet; Commonly known  as: Vitamin   D-3   cyanocobalamin 1,000 mcg tablet; Commonly known as: Vitamin B-12   hydrALAZINE 25 mg tablet; Commonly known as: Apresoline   losartan 50 mg tablet; Commonly known as: Cozaar; Take 1 tablet (50 mg)   by mouth 2 times a day.   pantoprazole 40 mg EC tablet; Commonly known as: ProtoNix; Take 1 tablet   (40 mg) by mouth once daily in the morning. Take before meals. Do not   crush, chew, or split.   pioglitazone 15 mg tablet; Commonly known as: Actos; Take 1 tablet (15   mg) by mouth once daily.   pregabalin 100 mg capsule; Commonly known as: Lyrica; Take 1 capsule   (100 mg) by mouth 2 times a day.       Test Results Pending At Discharge  Pending Labs       Order Current Status    Hepatitis B surface antigen In process    Staphylococcus Aureus/MRSA Colonization, Culture In process    Blood Culture Preliminary result            Hospital Course   Tana Hargrove is a 80 y.o. female with PMHx s/f myelodysplastic syndrome on Procrit, end-stage renal disease on hemodialysis, type 2 diabetes, hypertension, hyperlipidemia, diastolic heart failure LVEF 64% April 2025 presenting with near syncope.  Patient has been having issues with urinary frequency she did see a primary care provider last week gave urine sample.  The patient denies any fevers chills or rigors no dysuria.  Culture seems to have come back E. coli sensitive to Rocephin.  Patient has not been taking any antibiotics.  This morning patient had gotten out of bed was sitting down ordering something on the computer suddenly became very diaphoretic felt lightheaded like she was going to pass out.  Symptoms seem to improve.  Patient was evaluated in emergency department, workup in the emergency department shows the patient to have hyperglycemia elevated creatinine of 2.90 from baseline around 2 patient had Hellen cytopenia with white blood cell count of 3.7 anemia was about baseline at 7.8.  Case was discussed with the ED provider patient is to be  admitted for further evaluation and management.  The length of stay is not expected to exceed 2 midnights.     ED Course:   1.Vitals on presentation: T 35.8 °C (96.4 °F)  HR 50  /55  RR 18  O2 95 % None (Room air)  2.          Labs:   a.          CBC with WBC 3.7, Hgb 7.8, Plts 202.   b.          CMP with glucose 206, Na 138, K 4.0, BUN 34, sCr 2.90, alk phos 75, ALT 16, AST 19, bilirubin 1.1. Magnesium 1.95.   c.          . Trop 12.   d.Lactate 1.1  3.EKG: Sinus bradycardia 50 bpm QTc interval is prolonged 550 ms no ST segment changes to suggest acute ischemia  4.          Imaging - agree with radiology interpretation(s):   5.Interventions: Referred for admission     12-point ROS reviewed and found to be negative aside from aforementioned positives in HPI and/or noted in dedicated ROS section below.         [Medical History]     [Medical History]  Past Medical History       Diagnosis Date    Abnormal levels of other serum enzymes       Elevated liver enzymes    Acute kidney failure      Anemia      CKD (chronic kidney disease)      COPD (chronic obstructive pulmonary disease) (Multi)      Coronary artery disease      Disease of blood and blood-forming organs, unspecified       Bone marrow disorder    HLD (hyperlipidemia)      Hypertension      MDS (myelodysplastic syndrome) (Multi)      Personal history of other diseases of the musculoskeletal system and connective tissue       History of muscle pain    Personal history of other specified conditions       History of insomnia    Personal history of other specified conditions       History of edema    Type 2 diabetes mellitus      Urinary tract infection, site not specified 10/17/2024     [Surgical History]     [Surgical History]  Past Surgical History        Procedure Laterality Date    APPENDECTOMY        BREAST BIOPSY Left       left excisional    CHOLECYSTECTOMY   06/06/2024     partial cholecystectomy    COLONOSCOPY        HYSTERECTOMY         "JOINT REPLACEMENT        MR HEAD ANGIO WO IV CONTRAST   11/20/2020     MR HEAD ANGIO WO IV CONTRAST 11/20/2020 [facility]    MR NECK ANGIO WO IV CONTRAST   11/20/2020     MR NECK ANGIO WO IV CONTRAST 11/20/2020 [facility]ICAL LEGAC[facility]    OOPHORECTOMY        OTHER SURGICAL HISTORY   12/13/2019     Oophorectomy bilateral    OTHER SURGICAL HISTORY   12/13/2019     Tubal ligation    OTHER SURGICAL HISTORY Bilateral 12/13/2019     Knee replacement    OTHER SURGICAL HISTORY Left 12/13/2019     Shoulder surgery    OTHER SURGICAL HISTORY   12/13/2019     Hysterectomy    OTHER SURGICAL HISTORY   12/13/2019     Lumpectomy    OTHER SURGICAL HISTORY Right 12/13/2019     Foot surgery    OTHER SURGICAL HISTORY   04/16/2021     Back surgery     [Social History]     [Social History]        Tobacco Use    Smoking status: Never       Passive exposure: Never    Smokeless tobacco: Never   Vaping Use    Vaping status: Never Used   Substance Use Topics    Alcohol use: Not Currently    Drug use: Never     [Family History]     [Family History]         Problem Relation Name Age of Onset    Heart disease Mother        Heart attack Father        Hodgkin's lymphoma Son         [Allergies]    [Allergies]        Allergen Reactions    Codeine Unknown and Other       \"Feel like I'm floating\"     Could not speak did not feel good     lightheaded    Hydrocodone Other    Hydrocodone-Acetaminophen Unknown       \"I went into shock\" \" States she didn't know where she was after taking 1 dose.     Other reaction(s): Other (See Comments), Other: See Comments, shock   Went into shock   \"I went into shock\" \" States she didn't know where she was after taking 1 dose.     Went into shock    Tramadol Other, Unknown and Itching    Piperacillin-Tazobactam Diarrhea and Headache    Adhesive Tape-Silicones Other    Meperidine Nausea And Vomiting and Unknown       Could talk did not feel well     Medications: See full med list      6/24: Patient well-known to " me in the past.  Suspect most of her symptoms are secondary to UTI.     6/25: Short HD today to keep on her regular schedule. No fluid was removed with HD yesterday. Nephrology notes that Lyrica dosing is high for someone with ESRD. Follow cultures.       Patient doing well wishes to go home we will discharge home.  She will be on oral antibiotics.  She has been on this dose of Lyrica on dialysis for over a year without a problem I think things were associated with her UTI.  Will resume her Lyrica at regular dosing she is having significant peripheral neuropathy symptoms today.     Okay to discharge home  See after visit summary for complete plan  35 minutes spent in the care of this patient.       Pertinent Physical Exam At Time of Discharge  Physical Exam  See today's progress note for more physical exam findings  Outpatient Follow-Up  Future Appointments   Date Time Provider Department Center   6/26/2025 12:00 PM Azeem Medina MD [id number] [facility]   7/1/2025 10:00 AM Carlos Higgins MD [id number] [facility]   7/8/2025 12:30 PM Azeem Medina MD [id number] [facility]   7/8/2025 12:30 PM INF 10 [address] [id number] [facility]   9/30/2025 11:00 AM Humphrey Callaway DO [id number] [facility]   11/13/2025 10:00 AM Carlos Higgins MD [id number] Steve Head MD

## 2025-06-25 NOTE — PROGRESS NOTES
Occupational Therapy SCREEN                 Therapy Communication Note    Patient Name: Tana Hargrove  MRN: [id number]  Department: P[facility] 2 E OBS  Room: 2311/2311-B  Today's Date: 6/25/2025     Discipline: Occupational Therapy    OT order received. Chart reviewed and spoke with patient in her room after she returned from dialysis. Pt is anticipating DC today. Pt voiced she has no home going concerns. Patient adamantly declines therapy needs at this time. She states she does not need it. No skilled eval complete as pt refusing need for therapy. DC.

## 2025-06-25 NOTE — CARE PLAN
The patient's goals for the shift include  Patient will remain free from falls and be hemodynamically stable.    The clinical goals for the shift include patient will remain free from injury and falls, verbalize discomfort and dizziness    Problem: Pain - Adult  Goal: Verbalizes/displays adequate comfort level or baseline comfort level  Outcome: Progressing  Flowsheets (Taken 6/25/2025 1006)  Verbalizes/displays adequate comfort level or baseline comfort level:   Encourage patient to monitor pain and request assistance   Assess pain using appropriate pain scale   Administer analgesics based on type and severity of pain and evaluate response   Implement non-pharmacological measures as appropriate and evaluate response     Problem: Safety - Adult  Goal: Free from fall injury  Outcome: Progressing  Flowsheets (Taken 6/25/2025 1006)  Free from fall injury: Instruct family/caregiver on patient safety     Problem: Discharge Planning  Goal: Discharge to home or other facility with appropriate resources  Outcome: Progressing  Flowsheets (Taken 6/25/2025 1006)  Discharge to home or other facility with appropriate resources: Identify barriers to discharge with patient and caregiver

## 2025-06-25 NOTE — PROGRESS NOTES
Tana Hargrove is a 80 y.o. female on day 0 of admission presenting with Near syncope.    Subjective     History Of Present Illness (HPI):  Tana Hargrove is a 80 y.o. female with PMHx s/f myelodysplastic syndrome on Procrit, end-stage renal disease on hemodialysis, type 2 diabetes, hypertension, hyperlipidemia, diastolic heart failure LVEF 64% April 2025 presenting with near syncope.  Patient has been having issues with urinary frequency she did see a primary care provider last week gave urine sample.  The patient denies any fevers chills or rigors no dysuria.  Culture seems to have come back E. coli sensitive to Rocephin.  Patient has not been taking any antibiotics.  This morning patient had gotten out of bed was sitting down ordering something on the computer suddenly became very diaphoretic felt lightheaded like she was going to pass out.  Symptoms seem to improve.  Patient was evaluated in emergency department, workup in the emergency department shows the patient to have hyperglycemia elevated creatinine of 2.90 from baseline around 2 patient had Hellen cytopenia with white blood cell count of 3.7 anemia was about baseline at 7.8.  Case was discussed with the ED provider patient is to be admitted for further evaluation and management.  The length of stay is not expected to exceed 2 midnights.     ED Course:   1. Vitals on presentation: T 35.8 °C (96.4 °F)  HR 50  /55  RR 18  O2 95 % None (Room air)  2. Labs:   a. CBC with WBC 3.7, Hgb 7.8, Plts 202.   b. CMP with glucose 206, Na 138, K 4.0, BUN 34, sCr 2.90, alk phos 75, ALT 16, AST 19, bilirubin 1.1. Magnesium 1.95.   c. . Trop 12.   d. Lactate 1.1  3. EKG: Sinus bradycardia 50 bpm QTc interval is prolonged 550 ms no ST segment changes to suggest acute ischemia  4. Imaging - agree with radiology interpretation(s):   5. Interventions: Referred for admission     12-point ROS reviewed and found to be negative aside from aforementioned positives  "in HPI and/or noted in dedicated ROS section below.       Medical History[1]   Surgical History[2]   Social History[3]   Family History[4]   Allergies[5]  Medications: See full med list     6/24: Patient well-known to me in the past.  Suspect most of her symptoms are secondary to UTI.    6/25: Short HD today to keep on her regular schedule. No fluid was removed with HD yesterday. Nephrology notes that Lyrica dosing is high for someone with ESRD. Follow cultures.      Patient doing well wishes to go home we will discharge home.  She will be on oral antibiotics.  She has been on this dose of Lyrica on dialysis for over a year without a problem I think things were associated with her UTI.  Will resume her Lyrica at regular dosing she is having significant peripheral neuropathy symptoms today.    Okay to discharge home  See after visit summary for complete plan  35 minutes spent in the care of this patient.      Objective     Physical Exam    Constitutional: Feeling a little better but weak    Head/Facial: Sun and age-related changes slight paleness of the skin    Neck: Supple    Lungs: Clear to auscultation    Heart: Regular heart rate and frankie very soft systolic murmur    Abdomen: Nontender    Pelvis/: No flank tenderness    Extremities: No gross edema    Neurologic: No focal neurologic deficits       Psychiatric/Behavioral: Patient is pleasant and appropriate    Last Recorded Vitals  Blood pressure 177/71, pulse 64, temperature 36.9 °C (98.5 °F), temperature source Temporal, resp. rate 16, height 1.549 m (5' 0.98\"), weight 67.9 kg (149 lb 11.2 oz), SpO2 90%.  Intake/Output last 3 Shifts:  I/O last 3 completed shifts:  In: 1948 (28.7 mL/kg) [P.O.:748; I.V.:800 (11.8 mL/kg); Other:400]  Out: 1302 (19.2 mL/kg) [Urine:900 (0.4 mL/kg/hr); Other:402]  Weight: 67.9 kg     Relevant Results  No results found. However, due to the size of the patient record, not all encounters were searched. Please check Results Review for " a complete set of results.     Medications  Scheduled Medications[6]   Continuous Medications[7]   PRN Medications[8]                   Assessment/Plan    Near syncope  E.coli UTI  Suspect metabolic encephalopathy secondary to above  Sinus bradycardia-resolved?   ESRD on HD  DM-II  HLD  HTN  Chronic diastolic heart failure LVEF 64% (4/25)  Myelodysplastic syndrome- on Procrit    Okay to discharge home see after visit summary for complete plan.          BRAD DUNN  Shared visit with student  Patient seen and examined  Note amended and addended  See my orders for complete plan         [1]   Past Medical History:  Diagnosis Date    Abnormal levels of other serum enzymes     Elevated liver enzymes    Acute kidney failure     Anemia     CKD (chronic kidney disease)     COPD (chronic obstructive pulmonary disease) (Multi)     Coronary artery disease     Disease of blood and blood-forming organs, unspecified     Bone marrow disorder    HLD (hyperlipidemia)     Hypertension     MDS (myelodysplastic syndrome) (Multi)     Personal history of other diseases of the musculoskeletal system and connective tissue     History of muscle pain    Personal history of other specified conditions     History of insomnia    Personal history of other specified conditions     History of edema    Type 2 diabetes mellitus     Urinary tract infection, site not specified 10/17/2024   [2]   Past Surgical History:  Procedure Laterality Date    APPENDECTOMY      BREAST BIOPSY Left     left excisional    CHOLECYSTECTOMY  06/06/2024    partial cholecystectomy    COLONOSCOPY      HYSTERECTOMY      JOINT REPLACEMENT      MR HEAD ANGIO WO IV CONTRAST  11/20/2020    MR HEAD ANGIO WO IV CONTRAST 11/20/2020 [facility]    MR NECK ANGIO WO IV CONTRAST  11/20/2020    MR NECK ANGIO WO IV CONTRAST 11/20/2020 [facility]ICAL [facility]GAC[facility]    OOPHORECTOMY      OTHER SURGICAL HISTORY  12/13/2019    Oophorectomy bilateral    OTHER SURGICAL HISTORY  12/13/2019  "   Tubal ligation    OTHER SURGICAL HISTORY Bilateral 12/13/2019    Knee replacement    OTHER SURGICAL HISTORY Left 12/13/2019    Shoulder surgery    OTHER SURGICAL HISTORY  12/13/2019    Hysterectomy    OTHER SURGICAL HISTORY  12/13/2019    Lumpectomy    OTHER SURGICAL HISTORY Right 12/13/2019    Foot surgery    OTHER SURGICAL HISTORY  04/16/2021    Back surgery   [3]   Social History  Tobacco Use    Smoking status: Never     Passive exposure: Never    Smokeless tobacco: Never   Vaping Use    Vaping status: Never Used   Substance Use Topics    Alcohol use: Not Currently    Drug use: Never   [4]   Family History  Problem Relation Name Age of Onset    Heart disease Mother      Heart attack Father      Hodgkin's lymphoma Son     [5]   Allergies  Allergen Reactions    Codeine Unknown and Other     \"Feel like I'm floating\"    Could not speak did not feel good    lightheaded    Hydrocodone Other    Hydrocodone-Acetaminophen Unknown     \"I went into shock\" \" States she didn't know where she was after taking 1 dose.    Other reaction(s): Other (See Comments), Other: See Comments, shock   Went into shock   \"I went into shock\" \" States she didn't know where she was after taking 1 dose.    Went into shock    Tramadol Other, Unknown and Itching    Piperacillin-Tazobactam Diarrhea and Headache    Adhesive Tape-Silicones Other    Meperidine Nausea And Vomiting and Unknown     Could talk did not feel well   [6] amLODIPine, 5 mg, oral, BID  atorvastatin, 10 mg, oral, Nightly  carvedilol, 25 mg, oral, BID  cefTRIAXone, 1 g, intravenous, q24h  cholecalciferol, 50 mcg, oral, Daily  cyanocobalamin, 1,000 mcg, oral, Daily  heparin (porcine), 5,000 Units, subcutaneous, q8h JOSE DE JESUS  heparin, 2,000 Units, intra-catheter, After Dialysis  heparin, 2,000 Units, intra-catheter, After Dialysis  heparin flush, 50 Units, intra-catheter, q12h  hydrALAZINE, 25 mg, oral, BID  losartan, 50 mg, oral, BID  pantoprazole, 40 mg, oral, Daily before " breakfast   Or  pantoprazole, 40 mg, intravenous, Daily before breakfast  pioglitazone, 15 mg, oral, Daily  polyethylene glycol, 17 g, oral, Daily  [Held by provider] pregabalin, 50 mg, oral, Daily     [7]    [8] PRN medications: acetaminophen, alteplase, bisacodyl, guaiFENesin, heparin flush, melatonin, ondansetron **OR** ondansetron

## 2025-06-25 NOTE — PROGRESS NOTES
Physical Therapy                 Therapy Communication Note    Patient Name: Tana Hargrove  MRN: [id number]  Department: [facility] DIALYSIS  Room: 2311/2311-B  Today's Date: 6/25/2025     Discipline: Physical Therapy    Missed Visit: PT Missed Visit: Yes     Missed Visit Reason: Missed Visit Reason: Patient in a medical procedure (at dialysis, reattempt later today)    Missed Time: Attempt    Comment:

## 2025-06-25 NOTE — PRE-PROCEDURE NOTE
Report from Sending RN:    Report From: Lacey  Recent Surgery of Procedure: No  Baseline Level of Consciousness (LOC): a/o x3  Oxygen Use: No  Type: room air  Diabetic: No  Last BP Med Given Day of Dialysis: See MAR  Last Pain Med Given: See MAR  Lab Tests to be Obtained with Dialysis: No  Blood Transfusion to be Given During Dialysis: No  Available IV Access: Yes  Medications to be Administered During Dialysis: No  Continuous IV Infusion Running: No  Restraints on Currently or in the Last 24 Hours: No  Hand-Off Communication: full code; stable for hd tx; right cvc for hd tx  Dialysis Catheter Dressing: tbd  Last Dressing Change: tbd

## 2025-06-25 NOTE — PROGRESS NOTES
Occupational Therapy                 Therapy Communication Note    Patient Name: Tana Hargrove  MRN: [id number]  Department: [facility] DIALYSIS  Room: 231/2311-B  Today's Date: 6/25/2025     Discipline: Occupational Therapy    Missed Visit: OT Missed Visit: Yes     Missed Visit Reason: Missed Visit Reason: Patient in a medical procedure (OT eval attempted. pt currently in dialysis. not able to complete eval at this time)    Missed Time: Attempt    Comment:

## 2025-06-25 NOTE — NURSING NOTE
Patient progress throughout shift, remained free from injury and falls, no discomfort verbalized, seems to be feeling much better than yesterday, had dialysis Tuesday and is planned for today as well.  hoping to be discharged today

## 2025-06-25 NOTE — PROGRESS NOTES
06/25/25 1146   Discharge Planning   Expected Discharge Disposition Home   Patient Choice   Provider Choice list and CMS website (https://medicare.gov/care-compare#search) for post-acute Quality and Resource Measure Data were provided and reviewed with: Patient   Patient / Family choosing to utilize agency / facility established prior to hospitalization Yes     Met with patient at bedside. PT/ OT pending. Spoke with patient regarding HHC if recommended as she declined SNF placement if recommended as previously discussed. Provided HHC list to [facility] from [facility] that includes agencies that are within  Post - Acute Quality Network, as well as meeting patient's medical needs, and are in-network for patient's insurance; while also in discharge geographic area patient prefers, and identifies each facilities CMS star rating. She is requesting to not have HHC arranged on discharge. She states that she is moving into her daughters house and does not want HHC due to the moving. Advised her to reach out to her PCP if she would like HHC after discharge. She denies any other home going needs at this time. [facility] will continue to follow for needs if they arise.

## 2025-06-25 NOTE — NURSING NOTE
Discharge instructions reviewed with patient at bedside. Medications, including all new prescriptions, reviewed with patient. IV removed. All questions and concerns answered at this time. Patient safely discharged in wheelchair with all belongings.

## 2025-06-25 NOTE — CARE PLAN
The patient's goals for the shift include  closer to discharge    The clinical goals for the shift include patient will remain free from injury and falls, verbalize discomfort and dizziness      Problem: Skin  Goal: Decreased wound size/increased tissue granulation at next dressing change  Outcome: Progressing  Flowsheets (Taken 6/25/2025 0212)  Decreased wound size/increased tissue granulation at next dressing change: Promote sleep for wound healing  Goal: Participates in plan/prevention/treatment measures  Outcome: Progressing  Goal: Prevent/manage excess moisture  Outcome: Progressing  Goal: Prevent/minimize sheer/friction injuries  Outcome: Progressing  Goal: Promote/optimize nutrition  Outcome: Progressing  Goal: Promote skin healing  Outcome: Progressing     Problem: Pain - Adult  Goal: Verbalizes/displays adequate comfort level or baseline comfort level  Outcome: Progressing     Problem: Safety - Adult  Goal: Free from fall injury  Outcome: Progressing     Problem: Discharge Planning  Goal: Discharge to home or other facility with appropriate resources  Outcome: Progressing     Problem: Chronic Conditions and Co-morbidities  Goal: Patient's chronic conditions and co-morbidity symptoms are monitored and maintained or improved  Outcome: Progressing     Problem: Nutrition  Goal: Nutrient intake appropriate for maintaining nutritional needs  Outcome: Progressing

## 2025-06-25 NOTE — POST-PROCEDURE NOTE
Report to Receiving RN:    Report To: GE Rahman via Haiku message  Time Report Called: 1053 Haiku message sent   Hand-Off Communication: tx completed; pt tolerated tx well; vss post tx; pt discharged stable back to nursing floor  Complications During Treatment: No  Ultrafiltration Treatment: No  Medications Administered During Dialysis: No  Blood Products Administered During Dialysis: No  Labs Sent During Dialysis: No  Heparin Drip Rate Changes: No  Dialysis Catheter Dressing: clean, dry and intact  Last Dressing Change: 6/24/25    Electronic Signatures:  Kavitha Lord RN (Signed CE)   Authored: CE    Last Updated: 10:35 AM by KAVITHA LORD

## 2025-06-25 NOTE — PROGRESS NOTES
Physical Therapy                 Therapy Communication Note    Patient Name: Tana Hargrove  MRN: [id number]  Department: [facility] OBS  Room: 2311/2311B  Today's Date: 6/25/2025     Discipline: Physical Therapy      Comment: Spoke with patient in room. Patient stated she has no therapy needs at this time. Adamantly declines PT. Patient stated she was waiting for daughter to arrive for discharge and that discharge order was placed. Discharge from PT.         Completion of this session, clinical decision making, and documentation performed under the supervision/direction of Maritza Perez.      AKASH EDWARDS-PT

## 2025-06-26 ENCOUNTER — APPOINTMENT (OUTPATIENT)
Dept: HEMATOLOGY/ONCOLOGY | Facility: CLINIC | Age: 80
End: 2025-06-26
Payer: MEDICARE

## 2025-06-26 ENCOUNTER — PATIENT OUTREACH (OUTPATIENT)
Dept: PRIMARY CARE | Facility: CLINIC | Age: 80
End: 2025-06-26
Payer: MEDICARE

## 2025-06-26 LAB — STAPHYLOCOCCUS SPEC CULT: NORMAL

## 2025-06-26 NOTE — PROGRESS NOTES
90 Day Readmission  Discharge Facility: [facility]   Discharge Diagnosis: Near syncope, E.coli UTI, Metabolic encephalopathy secondary to above resolved, Sinus bradycardia-improved  Admission Date: 6/23/25  Discharge Date: 6/25/25    PCP Appointment Date: 7/1/25  Specialist Appointment Date: 6/26/25 hem onc  Hospital Encounter and Summary Linked: Yes  ED to Hosp-Admission (Discharged) with Warren Head MD; Alton Márquez MD (06/23/2025)   Two attempts were made to reach patient within two business days after discharge. Left voicemail with contact information for patient to call back with any non-emergent questions or concerns.

## 2025-06-28 LAB — BACTERIA BLD CULT: NORMAL

## 2025-06-30 ENCOUNTER — APPOINTMENT (OUTPATIENT)
Dept: HEMATOLOGY/ONCOLOGY | Facility: CLINIC | Age: 80
End: 2025-06-30
Payer: MEDICARE

## 2025-07-01 ENCOUNTER — TELEPHONE (OUTPATIENT)
Dept: PRIMARY CARE | Facility: CLINIC | Age: 80
End: 2025-07-01

## 2025-07-01 ENCOUNTER — APPOINTMENT (OUTPATIENT)
Dept: PRIMARY CARE | Facility: CLINIC | Age: 80
End: 2025-07-01
Payer: MEDICARE

## 2025-07-01 ENCOUNTER — LAB (OUTPATIENT)
Dept: LAB | Facility: HOSPITAL | Age: 80
End: 2025-07-01
Payer: MEDICARE

## 2025-07-01 VITALS
BODY MASS INDEX: 27.56 KG/M2 | HEIGHT: 61 IN | OXYGEN SATURATION: 97 % | SYSTOLIC BLOOD PRESSURE: 122 MMHG | RESPIRATION RATE: 20 BRPM | TEMPERATURE: 97.1 F | DIASTOLIC BLOOD PRESSURE: 65 MMHG | WEIGHT: 146 LBS | HEART RATE: 57 BPM

## 2025-07-01 DIAGNOSIS — D50.9 IRON DEFICIENCY ANEMIA, UNSPECIFIED IRON DEFICIENCY ANEMIA TYPE: ICD-10-CM

## 2025-07-01 DIAGNOSIS — R53.83 FATIGUE, UNSPECIFIED TYPE: ICD-10-CM

## 2025-07-01 DIAGNOSIS — M79.18 MYOFASCIAL PAIN SYNDROME: ICD-10-CM

## 2025-07-01 DIAGNOSIS — E11.9 TYPE 2 DIABETES MELLITUS WITHOUT COMPLICATION, WITHOUT LONG-TERM CURRENT USE OF INSULIN: ICD-10-CM

## 2025-07-01 DIAGNOSIS — Z09 HOSPITAL DISCHARGE FOLLOW-UP: Primary | ICD-10-CM

## 2025-07-01 DIAGNOSIS — M62.81 MUSCLE WEAKNESS (GENERALIZED): ICD-10-CM

## 2025-07-01 DIAGNOSIS — N18.9 ANEMIA DUE TO CHRONIC KIDNEY DISEASE, UNSPECIFIED CKD STAGE: ICD-10-CM

## 2025-07-01 DIAGNOSIS — R53.81 PHYSICAL DECONDITIONING: ICD-10-CM

## 2025-07-01 DIAGNOSIS — D63.1 ANEMIA DUE TO CHRONIC KIDNEY DISEASE, UNSPECIFIED CKD STAGE: ICD-10-CM

## 2025-07-01 DIAGNOSIS — D46.9 MYELODYSPLASTIC SYNDROME (MULTI): ICD-10-CM

## 2025-07-01 DIAGNOSIS — G57.93 NEUROPATHIC PAIN OF BOTH FEET: ICD-10-CM

## 2025-07-01 DIAGNOSIS — R29.6 REPEATED FALLS: ICD-10-CM

## 2025-07-01 DIAGNOSIS — I10 HYPERTENSION, ESSENTIAL: ICD-10-CM

## 2025-07-01 DIAGNOSIS — J44.1 CHRONIC OBSTRUCTIVE PULMONARY DISEASE WITH (ACUTE) EXACERBATION (MULTI): ICD-10-CM

## 2025-07-01 LAB
ABO GROUP (TYPE) IN BLOOD: NORMAL
ANTIBODY SCREEN: NORMAL
BASOPHILS # BLD AUTO: 0.04 X10*3/UL (ref 0–0.1)
BASOPHILS NFR BLD AUTO: 1.5 %
EOSINOPHIL # BLD AUTO: 0.12 X10*3/UL (ref 0–0.4)
EOSINOPHIL NFR BLD AUTO: 4.5 %
ERYTHROCYTE [DISTWIDTH] IN BLOOD BY AUTOMATED COUNT: 22.2 % (ref 11.5–14.5)
HCT VFR BLD AUTO: 19 % (ref 36–46)
HGB BLD-MCNC: 6.1 G/DL (ref 12–16)
IMM GRANULOCYTES # BLD AUTO: 0.06 X10*3/UL (ref 0–0.5)
IMM GRANULOCYTES NFR BLD AUTO: 2.2 % (ref 0–0.9)
LYMPHOCYTES # BLD AUTO: 1.1 X10*3/UL (ref 0.8–3)
LYMPHOCYTES NFR BLD AUTO: 41.2 %
MCH RBC QN AUTO: 30 PG (ref 26–34)
MCHC RBC AUTO-ENTMCNC: 32.1 G/DL (ref 32–36)
MCV RBC AUTO: 94 FL (ref 80–100)
MONOCYTES # BLD AUTO: 0.24 X10*3/UL (ref 0.05–0.8)
MONOCYTES NFR BLD AUTO: 9 %
NEUTROPHILS # BLD AUTO: 1.11 X10*3/UL (ref 1.6–5.5)
NEUTROPHILS NFR BLD AUTO: 41.6 %
NRBC BLD-RTO: ABNORMAL /100{WBCS}
PLATELET # BLD AUTO: 211 X10*3/UL (ref 150–450)
RBC # BLD AUTO: 2.03 X10*6/UL (ref 4–5.2)
RH FACTOR (ANTIGEN D): NORMAL
WBC # BLD AUTO: 2.7 X10*3/UL (ref 4.4–11.3)

## 2025-07-01 PROCEDURE — 3074F SYST BP LT 130 MM HG: CPT | Performed by: STUDENT IN AN ORGANIZED HEALTH CARE EDUCATION/TRAINING PROGRAM

## 2025-07-01 PROCEDURE — 1036F TOBACCO NON-USER: CPT | Performed by: STUDENT IN AN ORGANIZED HEALTH CARE EDUCATION/TRAINING PROGRAM

## 2025-07-01 PROCEDURE — 1160F RVW MEDS BY RX/DR IN RCRD: CPT | Performed by: STUDENT IN AN ORGANIZED HEALTH CARE EDUCATION/TRAINING PROGRAM

## 2025-07-01 PROCEDURE — 86922 COMPATIBILITY TEST ANTIGLOB: CPT

## 2025-07-01 PROCEDURE — 3078F DIAST BP <80 MM HG: CPT | Performed by: STUDENT IN AN ORGANIZED HEALTH CARE EDUCATION/TRAINING PROGRAM

## 2025-07-01 PROCEDURE — 86901 BLOOD TYPING SEROLOGIC RH(D): CPT

## 2025-07-01 PROCEDURE — 1159F MED LIST DOCD IN RCRD: CPT | Performed by: STUDENT IN AN ORGANIZED HEALTH CARE EDUCATION/TRAINING PROGRAM

## 2025-07-01 PROCEDURE — 99496 TRANSJ CARE MGMT HIGH F2F 7D: CPT | Performed by: STUDENT IN AN ORGANIZED HEALTH CARE EDUCATION/TRAINING PROGRAM

## 2025-07-01 PROCEDURE — 1126F AMNT PAIN NOTED NONE PRSNT: CPT | Performed by: STUDENT IN AN ORGANIZED HEALTH CARE EDUCATION/TRAINING PROGRAM

## 2025-07-01 PROCEDURE — 36415 COLL VENOUS BLD VENIPUNCTURE: CPT

## 2025-07-01 PROCEDURE — 85025 COMPLETE CBC W/AUTO DIFF WBC: CPT

## 2025-07-01 RX ORDER — PREGABALIN 75 MG/1
75 CAPSULE ORAL 2 TIMES DAILY
Qty: 60 CAPSULE | Refills: 0 | Status: SHIPPED | OUTPATIENT
Start: 2025-07-10 | End: 2025-08-09

## 2025-07-01 SDOH — ECONOMIC STABILITY: FOOD INSECURITY: WITHIN THE PAST 12 MONTHS, YOU WORRIED THAT YOUR FOOD WOULD RUN OUT BEFORE YOU GOT MONEY TO BUY MORE.: NEVER TRUE

## 2025-07-01 SDOH — ECONOMIC STABILITY: FOOD INSECURITY: WITHIN THE PAST 12 MONTHS, THE FOOD YOU BOUGHT JUST DIDN'T LAST AND YOU DIDN'T HAVE MONEY TO GET MORE.: NEVER TRUE

## 2025-07-01 ASSESSMENT — ENCOUNTER SYMPTOMS
COLOR CHANGE: 0
WEAKNESS: 1
OCCASIONAL FEELINGS OF UNSTEADINESS: 0
VOMITING: 0
DIZZINESS: 0
LOSS OF SENSATION IN FEET: 0
NAUSEA: 0
COUGH: 0
CONSTIPATION: 0
FEVER: 0
PALPITATIONS: 0
HEADACHES: 0
FATIGUE: 1
CONFUSION: 0
ABDOMINAL PAIN: 0
DEPRESSION: 0
UNEXPECTED WEIGHT CHANGE: 0
SHORTNESS OF BREATH: 0
DIARRHEA: 0
WHEEZING: 0
MUSCULOSKELETAL NEGATIVE: 1
CHILLS: 0

## 2025-07-01 ASSESSMENT — LIFESTYLE VARIABLES
SKIP TO QUESTIONS 9-10: 1
AUDIT-C TOTAL SCORE: 0
HOW MANY STANDARD DRINKS CONTAINING ALCOHOL DO YOU HAVE ON A TYPICAL DAY: PATIENT DOES NOT DRINK
HOW OFTEN DO YOU HAVE A DRINK CONTAINING ALCOHOL: NEVER
HOW OFTEN DO YOU HAVE SIX OR MORE DRINKS ON ONE OCCASION: NEVER

## 2025-07-01 ASSESSMENT — PAIN SCALES - GENERAL: PAINLEVEL_OUTOF10: 0-NO PAIN

## 2025-07-01 NOTE — PROGRESS NOTES
"Subjective   Patient ID: Tana Hargrove is a 80 y.o. female who presents for Hospital Follow-up (2025 - 2025 - [facility] Medical Per Discharge Note: Chronic diastolic heart failure LVEF 64% ()/Myelodysplastic syndrome-on Procrit/ Patient had gotten out of bed was sitting down suddenly became very diaphoretic felt lightheaded like she was going to pass out. patient to have hyperglycemia elevated creatinine of 2.90 from baseline around 2 patient had Hellen cytopenia with white blood cell count 3.7 anemia was about baseline at 7.8. /Today's visit (25) pt says she is feeling ok. ).    Patient: Tana Hargrove  : 1945  PCP: Carlos Higgins MD  MRN: [id number]  Program: Transitional Care Management  Status: Enrolled  Effective Dates: 2025 - present  Responsible Staff: Gerri Boykin RN  Social Drivers to be Addressed: Physical Activity         Tana Hargrove is a 80 y.o. female presenting today for follow-up after being discharged from the hospital 6 days ago. The main problem requiring admission was near syncope, E.coli, metabolic encephalopathy 2/2 UTI and other chronic problems. The discharge summary and/or Transitional Care Management documentation was reviewed. Medication reconciliation was performed as indicated via the \"[place] as Reviewed\" timestamp.     Tana Hargrove was contacted by Transitional Care Management services two days after her discharge. This encounter and supporting documentation was reviewed.  She is here for hospital discharge follow-up.  [facility], near syncopal episode and others as listed in EMR.  Reports she is doing okay today; last Hgb 7.8 (25) and last RF with Scr 2.9 higher than baseline, having HD 3 x weekly, doing okay and follows with Dr KEE. She is following with Heme/onc for anemia and possible transfusion. Last transfusion was about a month ago. Also her BP has improved, io /65; recently started on " "hydral after stopping amlodipine by nephro team.  Per hospital discharge summary, patient was advised to lower dose of Lyrica due to worsening serum creatinine.  Currently she is taking Lyrica 100 mg twice daily for neuropathic pain of bilateral feet, patient reports she really needs the medication due to ongoing burning sensation in her feet.  Reports Lyrica helps a lot.  Pt currently living with her dtr in [place]. She doesn't have skilled services coming to her house; used to have PT but not anymore. She is working to get a lift chair and would like Rx sent to [facility].    Review of Systems   Constitutional:  Positive for fatigue. Negative for chills, fever and unexpected weight change.   HENT: Negative.     Respiratory:  Negative for cough, shortness of breath and wheezing.    Cardiovascular:  Negative for chest pain, palpitations and leg swelling.   Gastrointestinal:  Negative for abdominal pain, constipation, diarrhea, nausea and vomiting.   Musculoskeletal: Negative.    Skin:  Negative for color change and rash.   Neurological:  Positive for weakness. Negative for dizziness and headaches.   Psychiatric/Behavioral:  Negative for behavioral problems and confusion.        /65 (BP Location: Left arm, Patient Position: Sitting, BP Cuff Size: Adult)   Pulse 57   Temp 36.2 °C (97.1 °F) (Temporal)   Resp 20   Ht (!) 1.549 m (5' 1\")   Wt 66.2 kg (146 lb) Comment: per pt  SpO2 97%   BMI 27.59 kg/m²     Physical Exam  Vitals and nursing note reviewed.   Constitutional:       General: She is not in acute distress.     Appearance: Normal appearance. She is not ill-appearing.      Comments: Overall well-appearing female, sitting on the wheelchair.  Friend in the room.   Cardiovascular:      Rate and Rhythm: Normal rate and regular rhythm.      Pulses: Normal pulses.      Heart sounds: Normal heart sounds.   Pulmonary:      Effort: Pulmonary effort is normal.      Breath sounds: Normal breath " sounds. No wheezing or rhonchi.   Abdominal:      General: Abdomen is flat. Bowel sounds are normal.      Palpations: Abdomen is soft.   Musculoskeletal:         General: Normal range of motion.   Neurological:      General: No focal deficit present.      Mental Status: She is alert.   Psychiatric:         Mood and Affect: Mood normal.         Behavior: Behavior normal.         The complexity of medical decision making for this patient's transitional care is high.    Assessment/Plan   She is here for hospital discharge follow-up.  Appears she is doing better other than ongoing fatigue and generalized weakness which is likely secondary to severe anemia and needing hemodialysis.  Recommend to continue follow-up with heme-onc for anemia and possible transfusion/iron infusion as needed, get blood work as ordered for monitoring hemoglobin.  Continue follow-up with nephro as scheduled and doing well with HD 3 times weekly.  BP remains well-controlled, continue current meds.  Amlodipine DC'd as recommended by nephro team.  We will also decrease dose of Lyrica from 100 mg twice daily to 75 mg twice daily due to worsening renal function.  New Rx sent to pharmacy.  OARRS reviewed and appears appropriate.  Will provide Rx for left ear as requested, faxed to DME store as requested.  She is otherwise clinically and vitally stable today.  Problem List Items Addressed This Visit           ICD-10-CM    Hypertension, essential (Chronic) I10    Myofascial pain syndrome M79.18    Relevant Medications    pregabalin (Lyrica) 75 mg capsule (Start on 7/10/2025)    Muscle weakness (generalized) M62.81    Relevant Orders    General supply request Lift chair    Repeated falls R29.6    Relevant Orders    General supply request Lift chair     Other Visit Diagnoses         Codes      Hospital discharge follow-up    -  Primary Z09      Neuropathic pain of both feet     G57.93    Relevant Medications    pregabalin (Lyrica) 75 mg capsule (Start on  7/10/2025)      Fatigue, unspecified type     R53.83      Physical deconditioning     R53.81    Relevant Orders    General supply request Lift chair      Type 2 diabetes mellitus without complication, without long-term current use of insulin     E11.9      Chronic obstructive pulmonary disease with (acute) exacerbation (Multi)     J44.1          RTC 3 to 4 months for follow-up/MCR    This note was partially generated using the Dragon Voice recognition system. There may be some incorrect wording, spelling and/or spelling errors or punctuation errors that were not corrected prior to committing the note to the medical record.      Carlos Higgins MD    [facility]

## 2025-07-01 NOTE — TELEPHONE ENCOUNTER
Patient requested an Rx for DME Lift Chair to be sent to [facility] please. She may call them to have them send forms over if necessary. Please advise..

## 2025-07-02 ENCOUNTER — APPOINTMENT (OUTPATIENT)
Dept: HEMATOLOGY/ONCOLOGY | Facility: CLINIC | Age: 80
End: 2025-07-02
Payer: MEDICARE

## 2025-07-03 ENCOUNTER — INFUSION (OUTPATIENT)
Dept: INFUSION THERAPY | Facility: HOSPITAL | Age: 80
End: 2025-07-03
Payer: MEDICARE

## 2025-07-03 VITALS
DIASTOLIC BLOOD PRESSURE: 75 MMHG | TEMPERATURE: 98.2 F | SYSTOLIC BLOOD PRESSURE: 155 MMHG | OXYGEN SATURATION: 94 % | HEART RATE: 75 BPM | RESPIRATION RATE: 18 BRPM

## 2025-07-03 DIAGNOSIS — D46.9 MYELODYSPLASTIC SYNDROME (MULTI): ICD-10-CM

## 2025-07-03 LAB
BLOOD EXPIRATION DATE: NORMAL
DISPENSE STATUS: NORMAL
PRODUCT BLOOD TYPE: 5100
PRODUCT CODE: NORMAL
UNIT ABO: NORMAL
UNIT NUMBER: NORMAL
UNIT RH: NORMAL
UNIT VOLUME: 277
XM INTEP: NORMAL

## 2025-07-03 PROCEDURE — P9040 RBC LEUKOREDUCED IRRADIATED: HCPCS

## 2025-07-03 PROCEDURE — 36430 TRANSFUSION BLD/BLD COMPNT: CPT

## 2025-07-03 RX ORDER — HEPARIN SODIUM,PORCINE/PF 10 UNIT/ML
50 SYRINGE (ML) INTRAVENOUS AS NEEDED
OUTPATIENT
Start: 2025-07-03

## 2025-07-03 RX ORDER — HEPARIN 100 UNIT/ML
500 SYRINGE INTRAVENOUS AS NEEDED
OUTPATIENT
Start: 2025-07-03

## 2025-07-03 ASSESSMENT — ENCOUNTER SYMPTOMS
LOSS OF SENSATION IN FEET: 0
DEPRESSION: 0
OCCASIONAL FEELINGS OF UNSTEADINESS: 0

## 2025-07-08 ENCOUNTER — INFUSION (OUTPATIENT)
Dept: HEMATOLOGY/ONCOLOGY | Facility: CLINIC | Age: 80
End: 2025-07-08
Payer: MEDICARE

## 2025-07-08 ENCOUNTER — OFFICE VISIT (OUTPATIENT)
Dept: HEMATOLOGY/ONCOLOGY | Facility: CLINIC | Age: 80
End: 2025-07-08
Payer: MEDICARE

## 2025-07-08 VITALS
TEMPERATURE: 98.1 F | BODY MASS INDEX: 29.14 KG/M2 | HEIGHT: 61 IN | HEART RATE: 65 BPM | WEIGHT: 154.32 LBS | RESPIRATION RATE: 16 BRPM | SYSTOLIC BLOOD PRESSURE: 150 MMHG | DIASTOLIC BLOOD PRESSURE: 53 MMHG | OXYGEN SATURATION: 96 %

## 2025-07-08 VITALS
RESPIRATION RATE: 16 BRPM | DIASTOLIC BLOOD PRESSURE: 75 MMHG | OXYGEN SATURATION: 97 % | SYSTOLIC BLOOD PRESSURE: 147 MMHG | HEART RATE: 62 BPM

## 2025-07-08 DIAGNOSIS — D50.9 IRON DEFICIENCY ANEMIA, UNSPECIFIED IRON DEFICIENCY ANEMIA TYPE: ICD-10-CM

## 2025-07-08 DIAGNOSIS — B34.3 PARVOVIRUS B19 INFECTION: ICD-10-CM

## 2025-07-08 DIAGNOSIS — D46.9 MYELODYSPLASTIC SYNDROME (MULTI): ICD-10-CM

## 2025-07-08 DIAGNOSIS — D64.9 ANEMIA, UNSPECIFIED TYPE: ICD-10-CM

## 2025-07-08 LAB
ABO GROUP (TYPE) IN BLOOD: NORMAL
ANTIBODY SCREEN: NORMAL
ERYTHROCYTE [DISTWIDTH] IN BLOOD BY AUTOMATED COUNT: 23.5 % (ref 11.5–14.5)
HCT VFR BLD AUTO: 21.4 % (ref 36–46)
HGB BLD-MCNC: 6.6 G/DL (ref 12–16)
MCH RBC QN AUTO: 30.7 PG (ref 26–34)
MCHC RBC AUTO-ENTMCNC: 30.8 G/DL (ref 32–36)
MCV RBC AUTO: 100 FL (ref 80–100)
NRBC BLD-RTO: ABNORMAL /100{WBCS}
PLATELET # BLD AUTO: 175 X10*3/UL (ref 150–450)
RBC # BLD AUTO: 2.15 X10*6/UL (ref 4–5.2)
RH FACTOR (ANTIGEN D): NORMAL
WBC # BLD AUTO: 3.1 X10*3/UL (ref 4.4–11.3)

## 2025-07-08 PROCEDURE — 99214 OFFICE O/P EST MOD 30 MIN: CPT | Performed by: INTERNAL MEDICINE

## 2025-07-08 PROCEDURE — 96365 THER/PROPH/DIAG IV INF INIT: CPT | Mod: INF

## 2025-07-08 PROCEDURE — 85027 COMPLETE CBC AUTOMATED: CPT | Performed by: INTERNAL MEDICINE

## 2025-07-08 PROCEDURE — 2500000004 HC RX 250 GENERAL PHARMACY W/ HCPCS (ALT 636 FOR OP/ED): Mod: JZ,TB | Performed by: INTERNAL MEDICINE

## 2025-07-08 PROCEDURE — 3077F SYST BP >= 140 MM HG: CPT | Performed by: INTERNAL MEDICINE

## 2025-07-08 PROCEDURE — 86901 BLOOD TYPING SEROLOGIC RH(D): CPT | Performed by: INTERNAL MEDICINE

## 2025-07-08 PROCEDURE — 3078F DIAST BP <80 MM HG: CPT | Performed by: INTERNAL MEDICINE

## 2025-07-08 PROCEDURE — 96366 THER/PROPH/DIAG IV INF ADDON: CPT | Mod: INF

## 2025-07-08 PROCEDURE — 1160F RVW MEDS BY RX/DR IN RCRD: CPT | Performed by: INTERNAL MEDICINE

## 2025-07-08 PROCEDURE — 1159F MED LIST DOCD IN RCRD: CPT | Performed by: INTERNAL MEDICINE

## 2025-07-08 PROCEDURE — 1126F AMNT PAIN NOTED NONE PRSNT: CPT | Performed by: INTERNAL MEDICINE

## 2025-07-08 PROCEDURE — 36415 COLL VENOUS BLD VENIPUNCTURE: CPT | Performed by: INTERNAL MEDICINE

## 2025-07-08 RX ORDER — ALBUTEROL SULFATE 0.83 MG/ML
3 SOLUTION RESPIRATORY (INHALATION) AS NEEDED
OUTPATIENT
Start: 2025-07-15

## 2025-07-08 RX ORDER — ACETAMINOPHEN 325 MG/1
650 TABLET ORAL ONCE
OUTPATIENT
Start: 2025-07-15

## 2025-07-08 RX ORDER — FAMOTIDINE 10 MG/ML
20 INJECTION, SOLUTION INTRAVENOUS ONCE AS NEEDED
Status: DISCONTINUED | OUTPATIENT
Start: 2025-07-08 | End: 2025-07-08 | Stop reason: HOSPADM

## 2025-07-08 RX ORDER — DIPHENHYDRAMINE HYDROCHLORIDE 50 MG/ML
50 INJECTION, SOLUTION INTRAMUSCULAR; INTRAVENOUS AS NEEDED
Status: DISCONTINUED | OUTPATIENT
Start: 2025-07-08 | End: 2025-07-08 | Stop reason: HOSPADM

## 2025-07-08 RX ORDER — DIPHENHYDRAMINE HYDROCHLORIDE 50 MG/ML
50 INJECTION, SOLUTION INTRAMUSCULAR; INTRAVENOUS AS NEEDED
OUTPATIENT
Start: 2025-07-15

## 2025-07-08 RX ORDER — EPINEPHRINE 0.3 MG/.3ML
0.3 INJECTION SUBCUTANEOUS EVERY 5 MIN PRN
Status: DISCONTINUED | OUTPATIENT
Start: 2025-07-08 | End: 2025-07-08 | Stop reason: HOSPADM

## 2025-07-08 RX ORDER — HEPARIN SODIUM,PORCINE/PF 10 UNIT/ML
50 SYRINGE (ML) INTRAVENOUS AS NEEDED
OUTPATIENT
Start: 2025-07-08

## 2025-07-08 RX ORDER — HEPARIN 100 UNIT/ML
500 SYRINGE INTRAVENOUS AS NEEDED
OUTPATIENT
Start: 2025-07-08

## 2025-07-08 RX ORDER — ALBUTEROL SULFATE 0.83 MG/ML
3 SOLUTION RESPIRATORY (INHALATION) AS NEEDED
Status: DISCONTINUED | OUTPATIENT
Start: 2025-07-08 | End: 2025-07-08 | Stop reason: HOSPADM

## 2025-07-08 RX ORDER — FAMOTIDINE 10 MG/ML
20 INJECTION, SOLUTION INTRAVENOUS ONCE AS NEEDED
OUTPATIENT
Start: 2025-07-15

## 2025-07-08 RX ORDER — ACETAMINOPHEN 325 MG/1
650 TABLET ORAL ONCE
Status: DISCONTINUED | OUTPATIENT
Start: 2025-07-08 | End: 2025-07-08 | Stop reason: HOSPADM

## 2025-07-08 RX ORDER — DIPHENHYDRAMINE HCL 25 MG
25 CAPSULE ORAL ONCE
Status: DISCONTINUED | OUTPATIENT
Start: 2025-07-08 | End: 2025-07-08 | Stop reason: HOSPADM

## 2025-07-08 RX ORDER — EPINEPHRINE 0.3 MG/.3ML
0.3 INJECTION SUBCUTANEOUS EVERY 5 MIN PRN
OUTPATIENT
Start: 2025-07-15

## 2025-07-08 RX ORDER — DIPHENHYDRAMINE HCL 25 MG
25 CAPSULE ORAL ONCE
OUTPATIENT
Start: 2025-07-15

## 2025-07-08 RX ADMIN — IMMUNE GLOBULIN INFUSION (HUMAN) 30 G: 100 INJECTION, SOLUTION INTRAVENOUS; SUBCUTANEOUS at 13:36

## 2025-07-08 ASSESSMENT — PAIN SCALES - GENERAL: PAINLEVEL_OUTOF10: 0-NO PAIN

## 2025-07-08 NOTE — PATIENT INSTRUCTIONS
Follow up visit for history of chronic anemia due to myelodysplastic syndrome, end stage renal disease.     IVIG #2 today, will be scheduled for 2 additional infusions.     Follow up with Dr. Medina in 4 weeks after completing IVIG.     Weekly lab checks on Tuesdays with blood transfusions on Thursdays.

## 2025-07-08 NOTE — PROGRESS NOTES
Tana Hargrove  Female, 79 y.o., 1945  MRN: [id number]  Medical problems  #1 myelodysplastic syndrome    Procrit 80,000 unit every week    2.  Chronic anemia due to chronic kidney disease    3.  History of iron deficiency anemia, elevated ferritin level due to frequent RBC transfusion    4.  ESRD on hemodialysis    Interval history  7/08/25  Tana Hargrove presents today for interval follow-up and treatment of anemia secondary MDS and chronic disease.     She has been receiving Procrit 80,000 weekly and tolerating well.  She denies bleeding, nausea vomiting, chest pain, abdominal pain or other new symptoms.   Today hemoglobin is 6.6.  So far patient only received 1 dose of IVIG due to frequent hospitalization.    Complaining of weakness but did not feel any different in clinically status        Past medical history: Anemia secondary to MDS (with minor component of myelofibrosis) and chronic disease, diagnosed in January 2013. She  has been maintained on weekly injections of Procrit, 80,000 units.. She has required transfusion intermittently. History also significant for diabetes, lumbar spinal stenosis and degenerative disc disease, hypertension, osteoporosis, GERD, hyperlipidemia,  benign breast disease, gout, hysterectomy, BSO, knee replacement surgeries, back surgery, arthritis, foot surgery, diverticulosis, colon polyp and skin cancers.  COVID-19 infection (11/20/20) .  COVID-19 vaccination (Moderna) February 2021.  [facility] spine  surgery March 2021.  Had Shingrix vaccine 9/2020.  Pneumovax 9/2020.  ESTEE, resolving.     Family medical history: Sister had colon cancer at age 50.        Review of Systems:   Review of Systems:    Constitutional: No fever, chills, night sweats.  Increased fatigue.    Head and neck: No headaches or dizziness.  No pain or stiffness   HEENT: No sore throat or sinusitis.  Hearing is normal and eyesight is good.  Cardiac: No chest pain or palpitations  Respiratory: No increased dyspnea,  cough, hemoptysis.  Mild BERNAL.  GI: Appetite is good and weight stable.  No constipation, diarrhea.  No abdominal pain, nausea or vomiting.  Genitourinary: No frequency or urgency.  No polyuria, dysuria.  Musculoskeletal: Chronic low back pain, knee pain, left foot pain.  Endocrine: History of diabetes; no thyroid disease.  Skin: No rash, skin lesions, itching.  Neuromuscular: no fainting or dizziness.  No history of seizure.  Occasional tremor, spasms and weakness in her extremities                 Allergies and Intolerances:       Allergies:         Demerol HCl: Drug, Unknown, Active         codeine: Drug, Unknown, Active         Vicodin: Drug, Unknown, Active         Ultram: Drug, Unknown, Active         Tape: Environment, Unknown, Active     Outpatient Medication Profile:  * Patient Currently Takes Medications as of 17-Jul-2023 15:52 documented in Structured Notes         sodium polystyrene sulfonate 15 g/60 mL  oral and rectal suspension: Last Dose Taken:  , 60 milliliter(s) orally ONCE but may repeat up to          4 times a day until loose stool. , Start Date: 12-Jul-2023         bumetanide 1 mg oral tablet: Last Dose Taken:  , 1 tab(s) orally once  a day, Start Date: 15-Dec-2022         Lyrica 100 mg oral capsule: Last Dose Taken:  , 1 cap(s) orally 2 times  a day          OARRS LF 11-22-22         60/30, Start Date: 20-May-2022         metoprolol tartrate 25 mg oral tablet: Last Dose Taken:  , 1 tab(s) orally  2 times a day, Start Date: 10-Apr-2022         aspirin 81 mg oral tablet: Last Dose Taken:  , 1 tab(s) orally once a day         pantoprazole 40 mg oral delayed release tablet: Last Dose Taken:  , 1  tab(s) orally once a day         amLODIPine 5 mg oral tablet: Last Dose Taken:  , 1 tab(s) orally once  a day         Vitamin D3 50 mcg (2000 intl units) oral tablet: Last Dose Taken:  ,  1 tab(s) orally once a day         Ventolin HFA 90 mcg/inh inhalation aerosol: Last Dose Taken:  , 2 puff(s)  inhaled 4  times a day, As Needed         allopurinol 100 mg oral tablet: Last Dose Taken:  , 1 tab(s) orally 2  times a day         pravastatin 40 mg oral tablet: Last Dose Taken:  , 1 tab(s) orally once  a day (at bedtime)         Januvia 100 mg oral tablet: Last Dose Taken:  , 1 tab(s) orally once  a day         glimepiride 4 mg oral tablet: Last Dose Taken:  , 1 tab(s) orally once  a day         acetaminophen 500 mg oral tablet: Last Dose Taken:  , 2 tab(s) orally  every 8 hours, As Needed             Medical History:         Anemia: ICD-10: D64.9, Status: Active         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      Social History:   Social Substance History:  ·  Smoking Status never smoker (1)            Vitals and Measurements:   Vitals: Temp: 36.7  HR: 83  RR: 16  BP: 140/66  SPO2%:   94   Measurements: HT(cm): 157.4  WT(kg): NA  BSA: NA   BMI:  NA      Physical Exam:      Constitutional: No acute distress.  Alert and oriented.   Appears chronically ill.   Eyes: PERRL, EOMI, clear sclera.   ENMT: mucous membranes moist, no apparent injury,  no lesions seen   Head/Neck: Unremarkable.  No JVD. Trachea midline.   Respiratory/Thorax: Clear to auscultation. Normal  breath sounds. No wheezes, rales, rhonchi.   Cardiovascular: Regular, rate and rhythm.   Gastrointestinal: Nondistended.  Normal bowel sounds.   Musculoskeletal: ROM intact.  No joint swelling.  Adequate strength. No deformity.   Extremities: Mild ankle edema.   Neurological: Alert and oriented x3. Senses and cranial  nerves grossly intact. No focal motor or sensory deficits noted.   Psychological: Appropriate mood and affect.  Slightly  depressed affect.   Skin: Warm and dry, no rashes, no suspicious lesions.         Lab Results:          12:33  (7/8/25) 7 d ago  (7/1/25) 2 wk ago  (6/23/25) 4 wk ago  (6/10/25) 1 mo ago  (6/3/25) 1 mo ago  (5/29/25) 1 mo ago  (5/20/25)     WBC  4.4 - 11.3 x10*3/uL 3.1 Low  2.7 Low  3.7 Low  3.6 Low  3.2 Low  3.1 Low  3.6 Low    nRBC  CM 0.5 High  R  CM 0.0 R    Comment: Not Measured   RBC  4.00 - 5.20 x10*6/uL 2.15 Low  2.03 Low  2.60 Low  2.37 Low  2.20 Low  2.11 Low  2.33 Low    Hemoglobin  12.0 - 16.0 g/dL 6.6 Low  6.1 Low Panic  7.8 Low  7.3 Low  6.7 Low  6.4 Low Panic  7.0 Low    Hematocrit  36.0 - 46.0 % 21.4 Low  19.0 Low  24.7 Low  23.2 Low  21.6 Low  20.7 Low  22.8 Low    MCV  80 - 100 fL 100 94 95 98 98 98 98   MCH  26.0 - 34.0 pg 30.7 30.0 30.0 30.8 30.5 30.3 30.0   MCHC  32.0 - 36.0 g/dL 30.8 Low  32.1 31.6 Low  31.5 Low  31.0 Low  30.9 Low  30.7 Low    RDW  11.5 - 14.5 % 23.5 High  22.2 High  21.1 High  19.7 High  20.0 High  20.6 High  18.8 High    Platelets  150 - 450 x10*3/uL 175                   Parvovirus B19  IgG 1.95            Assessment and Plan:   Assessment:    1.  Anemia due to myelodysplastic syndrome, renal insufficiency and chronic disease, on Procrit.  She has required transfusions intermittently, especially when hemoglobin  < 7.5.  Hgb today is 6.7. patient is very tired and weak. We scheduled her for a transfusion of 2 units PRBC's tomorrow. She will continue the Procrit 80,000units weekly for now, but I don't think she is responding. She may be at the point of being transfusion  dependent. Ferritin is elevated most likely from frequent transfusions and inflammation. Not sure if she would benefit from chelating agents.     2.  History of hypertension, arthritis, skin cancer, diabetes and other medical problems     3.  Degenerative disease of the spine, knees, hips, etc.      4.  CKD - stage 4, on Procrit as stated above.      5.  Elevated Parvovirus B19 antibody    7/8/25  Plan, patient has chronic anemia due to myelodysplastic syndrome and chronic renal insufficiency.  Parvovirus B19 IgG is also elevated, patient receiving IVIG today patient received second dose of IVIG.  Hemoglobin is 6.6, will make arrangement for  RBC transfusion as outpatient, continue Procrit.  Patient advised if he is feeling more weakness fatigue, dizziness shortness of breath go to ER.  Patient not interested to have repeated bone marrow biopsy and aspiration  Follow-up after 4-week.  Time spent 30 minutes

## 2025-07-08 NOTE — PROGRESS NOTES
pt tolerated today's IVIG infusions without difficulty. VS stable throughout infusion and post. Pt aware of upcoming appt schedule and will call with any questions and/or concerns.

## 2025-07-15 ENCOUNTER — INFUSION (OUTPATIENT)
Dept: HEMATOLOGY/ONCOLOGY | Facility: CLINIC | Age: 80
End: 2025-07-15
Payer: MEDICARE

## 2025-07-15 VITALS
HEIGHT: 61 IN | WEIGHT: 152.2 LBS | HEART RATE: 67 BPM | OXYGEN SATURATION: 96 % | TEMPERATURE: 97.7 F | SYSTOLIC BLOOD PRESSURE: 160 MMHG | DIASTOLIC BLOOD PRESSURE: 72 MMHG | BODY MASS INDEX: 28.74 KG/M2

## 2025-07-15 DIAGNOSIS — D46.9 MYELODYSPLASTIC SYNDROME (MULTI): ICD-10-CM

## 2025-07-15 DIAGNOSIS — D50.9 IRON DEFICIENCY ANEMIA, UNSPECIFIED IRON DEFICIENCY ANEMIA TYPE: ICD-10-CM

## 2025-07-15 DIAGNOSIS — B34.3 PARVOVIRUS B19 INFECTION: ICD-10-CM

## 2025-07-15 DIAGNOSIS — D64.9 ANEMIA, UNSPECIFIED TYPE: ICD-10-CM

## 2025-07-15 LAB
ABO GROUP (TYPE) IN BLOOD: NORMAL
ANTIBODY SCREEN: NORMAL
BASOPHILS # BLD AUTO: 0.02 X10*3/UL (ref 0–0.1)
BASOPHILS NFR BLD AUTO: 0.6 %
DACRYOCYTES BLD QL SMEAR: NORMAL
EOSINOPHIL # BLD AUTO: 0.11 X10*3/UL (ref 0–0.4)
EOSINOPHIL NFR BLD AUTO: 3.2 %
ERYTHROCYTE [DISTWIDTH] IN BLOOD BY AUTOMATED COUNT: 24.8 % (ref 11.5–14.5)
HCT VFR BLD AUTO: 20.7 % (ref 36–46)
HGB BLD-MCNC: 6.7 G/DL (ref 12–16)
HYPOCHROMIA BLD QL SMEAR: NORMAL
IMM GRANULOCYTES # BLD AUTO: 0.03 X10*3/UL (ref 0–0.5)
IMM GRANULOCYTES NFR BLD AUTO: 0.9 % (ref 0–0.9)
LYMPHOCYTES # BLD AUTO: 0.73 X10*3/UL (ref 0.8–3)
LYMPHOCYTES NFR BLD AUTO: 21.3 %
MCH RBC QN AUTO: 31.8 PG (ref 26–34)
MCHC RBC AUTO-ENTMCNC: 32.4 G/DL (ref 32–36)
MCV RBC AUTO: 98 FL (ref 80–100)
MONOCYTES # BLD AUTO: 0.27 X10*3/UL (ref 0.05–0.8)
MONOCYTES NFR BLD AUTO: 7.9 %
NEUTROPHILS # BLD AUTO: 2.27 X10*3/UL (ref 1.6–5.5)
NEUTROPHILS NFR BLD AUTO: 66.1 %
NRBC BLD-RTO: ABNORMAL /100{WBCS}
OVALOCYTES BLD QL SMEAR: NORMAL
PLATELET # BLD AUTO: 149 X10*3/UL (ref 150–450)
RBC # BLD AUTO: 2.11 X10*6/UL (ref 4–5.2)
RBC MORPH BLD: NORMAL
RH FACTOR (ANTIGEN D): NORMAL
WBC # BLD AUTO: 3.4 X10*3/UL (ref 4.4–11.3)

## 2025-07-15 PROCEDURE — 96366 THER/PROPH/DIAG IV INF ADDON: CPT | Mod: INF

## 2025-07-15 PROCEDURE — 86901 BLOOD TYPING SEROLOGIC RH(D): CPT

## 2025-07-15 PROCEDURE — 96365 THER/PROPH/DIAG IV INF INIT: CPT | Mod: INF

## 2025-07-15 PROCEDURE — 2500000001 HC RX 250 WO HCPCS SELF ADMINISTERED DRUGS (ALT 637 FOR MEDICARE OP): Performed by: INTERNAL MEDICINE

## 2025-07-15 PROCEDURE — 86922 COMPATIBILITY TEST ANTIGLOB: CPT

## 2025-07-15 PROCEDURE — 2500000004 HC RX 250 GENERAL PHARMACY W/ HCPCS (ALT 636 FOR OP/ED): Mod: JZ,TB | Performed by: INTERNAL MEDICINE

## 2025-07-15 PROCEDURE — 85025 COMPLETE CBC W/AUTO DIFF WBC: CPT

## 2025-07-15 RX ORDER — ACETAMINOPHEN 325 MG/1
650 TABLET ORAL ONCE
Status: COMPLETED | OUTPATIENT
Start: 2025-07-15 | End: 2025-07-15

## 2025-07-15 RX ORDER — EPINEPHRINE 0.3 MG/.3ML
0.3 INJECTION SUBCUTANEOUS EVERY 5 MIN PRN
OUTPATIENT
Start: 2025-07-22

## 2025-07-15 RX ORDER — DIPHENHYDRAMINE HCL 25 MG
25 CAPSULE ORAL ONCE
Status: COMPLETED | OUTPATIENT
Start: 2025-07-15 | End: 2025-07-15

## 2025-07-15 RX ORDER — ALBUTEROL SULFATE 0.83 MG/ML
3 SOLUTION RESPIRATORY (INHALATION) AS NEEDED
Status: DISCONTINUED | OUTPATIENT
Start: 2025-07-15 | End: 2025-07-15 | Stop reason: HOSPADM

## 2025-07-15 RX ORDER — FAMOTIDINE 10 MG/ML
20 INJECTION, SOLUTION INTRAVENOUS ONCE AS NEEDED
OUTPATIENT
Start: 2025-07-22

## 2025-07-15 RX ORDER — ACETAMINOPHEN 325 MG/1
650 TABLET ORAL ONCE
OUTPATIENT
Start: 2025-07-22

## 2025-07-15 RX ORDER — DIPHENHYDRAMINE HCL 25 MG
25 CAPSULE ORAL ONCE
OUTPATIENT
Start: 2025-07-22

## 2025-07-15 RX ORDER — DIPHENHYDRAMINE HYDROCHLORIDE 50 MG/ML
50 INJECTION, SOLUTION INTRAMUSCULAR; INTRAVENOUS AS NEEDED
OUTPATIENT
Start: 2025-07-22

## 2025-07-15 RX ORDER — ALBUTEROL SULFATE 0.83 MG/ML
3 SOLUTION RESPIRATORY (INHALATION) AS NEEDED
OUTPATIENT
Start: 2025-07-22

## 2025-07-15 RX ORDER — DIPHENHYDRAMINE HYDROCHLORIDE 50 MG/ML
50 INJECTION, SOLUTION INTRAMUSCULAR; INTRAVENOUS AS NEEDED
Status: DISCONTINUED | OUTPATIENT
Start: 2025-07-15 | End: 2025-07-15 | Stop reason: HOSPADM

## 2025-07-15 RX ORDER — EPINEPHRINE 0.3 MG/.3ML
0.3 INJECTION SUBCUTANEOUS EVERY 5 MIN PRN
Status: DISCONTINUED | OUTPATIENT
Start: 2025-07-15 | End: 2025-07-15 | Stop reason: HOSPADM

## 2025-07-15 RX ORDER — FAMOTIDINE 10 MG/ML
20 INJECTION, SOLUTION INTRAVENOUS ONCE AS NEEDED
Status: DISCONTINUED | OUTPATIENT
Start: 2025-07-15 | End: 2025-07-15 | Stop reason: HOSPADM

## 2025-07-15 RX ADMIN — IMMUNE GLOBULIN INFUSION (HUMAN) 30 G: 100 INJECTION, SOLUTION INTRAVENOUS; SUBCUTANEOUS at 10:20

## 2025-07-15 RX ADMIN — DIPHENHYDRAMINE HYDROCHLORIDE 25 MG: 25 CAPSULE ORAL at 09:17

## 2025-07-15 RX ADMIN — ACETAMINOPHEN 650 MG: 325 TABLET ORAL at 09:18

## 2025-07-15 ASSESSMENT — PAIN SCALES - GENERAL: PAINLEVEL_OUTOF10: 10-WORST PAIN EVER

## 2025-07-15 NOTE — PROGRESS NOTES
Pt arrived awake, alert, oriented, no apparent distress with unlabored breaths. Pt reports despite ongoing weakness/fatigue, otherwise without concern for acute illness. Pt here for IVIG infusion, in which she received and tolerated well as of thus far. Pt with noted hemoglobin of 6.7, has a set appointment at the infusion center for blood transfusion on 7/17/25. Pt verbalized understanding importance of going to this appointment and to seek higher level of care for worsening/concerning symptoms. Next IVIG infusion appointment set for 7/22/25. Pt given AVS, no further questions or concerns, discharged in stable condition

## 2025-07-17 ENCOUNTER — PATIENT OUTREACH (OUTPATIENT)
Dept: PRIMARY CARE | Facility: CLINIC | Age: 80
End: 2025-07-17

## 2025-07-17 ENCOUNTER — INFUSION (OUTPATIENT)
Dept: INFUSION THERAPY | Facility: HOSPITAL | Age: 80
End: 2025-07-17
Payer: MEDICARE

## 2025-07-17 VITALS
OXYGEN SATURATION: 94 % | TEMPERATURE: 99.2 F | HEART RATE: 90 BPM | SYSTOLIC BLOOD PRESSURE: 216 MMHG | DIASTOLIC BLOOD PRESSURE: 92 MMHG | RESPIRATION RATE: 18 BRPM

## 2025-07-17 DIAGNOSIS — D46.9 MYELODYSPLASTIC SYNDROME (MULTI): ICD-10-CM

## 2025-07-17 DIAGNOSIS — R11.2 CHEMOTHERAPY INDUCED NAUSEA AND VOMITING: ICD-10-CM

## 2025-07-17 DIAGNOSIS — T45.1X5A CHEMOTHERAPY INDUCED NAUSEA AND VOMITING: ICD-10-CM

## 2025-07-17 DIAGNOSIS — T45.1X5A CHEMOTHERAPY INDUCED NAUSEA AND VOMITING: Primary | ICD-10-CM

## 2025-07-17 DIAGNOSIS — R11.2 CHEMOTHERAPY INDUCED NAUSEA AND VOMITING: Primary | ICD-10-CM

## 2025-07-17 PROCEDURE — 86902 BLOOD TYPE ANTIGEN DONOR EA: CPT

## 2025-07-17 PROCEDURE — 36430 TRANSFUSION BLD/BLD COMPNT: CPT

## 2025-07-17 PROCEDURE — P9040 RBC LEUKOREDUCED IRRADIATED: HCPCS

## 2025-07-17 PROCEDURE — 2500000004 HC RX 250 GENERAL PHARMACY W/ HCPCS (ALT 636 FOR OP/ED): Performed by: NURSE PRACTITIONER

## 2025-07-17 RX ORDER — ONDANSETRON 4 MG/1
4 TABLET, FILM COATED ORAL ONCE
Status: COMPLETED | OUTPATIENT
Start: 2025-07-17 | End: 2025-07-17

## 2025-07-17 RX ORDER — ONDANSETRON 4 MG/1
4 TABLET, FILM COATED ORAL ONCE
Status: CANCELLED | OUTPATIENT
Start: 2025-07-17 | End: 2025-07-17

## 2025-07-17 RX ORDER — ONDANSETRON HYDROCHLORIDE 2 MG/ML
4 INJECTION, SOLUTION INTRAVENOUS ONCE
OUTPATIENT
Start: 2025-07-17 | End: 2025-07-17

## 2025-07-17 RX ORDER — HEPARIN SODIUM,PORCINE/PF 10 UNIT/ML
50 SYRINGE (ML) INTRAVENOUS AS NEEDED
OUTPATIENT
Start: 2025-07-17

## 2025-07-17 RX ORDER — HEPARIN 100 UNIT/ML
500 SYRINGE INTRAVENOUS AS NEEDED
OUTPATIENT
Start: 2025-07-17

## 2025-07-17 RX ADMIN — ONDANSETRON HYDROCHLORIDE 4 MG: 4 TABLET, FILM COATED ORAL at 11:00

## 2025-07-17 ASSESSMENT — ENCOUNTER SYMPTOMS
LOSS OF SENSATION IN FEET: 0
DEPRESSION: 0
OCCASIONAL FEELINGS OF UNSTEADINESS: 1

## 2025-07-18 LAB
ATRIAL RATE: 50 BPM
P AXIS: 15 DEGREES
PR INTERVAL: 162 MS
Q ONSET: 249 MS
QRS COUNT: 8 BEATS
QRS DURATION: 88 MS
QT INTERVAL: 603 MS
QTC CALCULATION(BAZETT): 550 MS
QTC FREDERICIA: 567 MS
R AXIS: 3 DEGREES
T AXIS: 6 DEGREES
T OFFSET: 551 MS
VENTRICULAR RATE: 50 BPM

## 2025-07-22 ENCOUNTER — INFUSION (OUTPATIENT)
Dept: HEMATOLOGY/ONCOLOGY | Facility: CLINIC | Age: 80
End: 2025-07-22
Payer: MEDICARE

## 2025-07-22 VITALS
RESPIRATION RATE: 16 BRPM | DIASTOLIC BLOOD PRESSURE: 67 MMHG | SYSTOLIC BLOOD PRESSURE: 161 MMHG | HEIGHT: 61 IN | WEIGHT: 152 LBS | OXYGEN SATURATION: 97 % | HEART RATE: 66 BPM | BODY MASS INDEX: 28.7 KG/M2 | TEMPERATURE: 97.2 F

## 2025-07-22 DIAGNOSIS — N18.9 ANEMIA DUE TO CHRONIC KIDNEY DISEASE, UNSPECIFIED CKD STAGE: ICD-10-CM

## 2025-07-22 DIAGNOSIS — D50.9 IRON DEFICIENCY ANEMIA, UNSPECIFIED IRON DEFICIENCY ANEMIA TYPE: ICD-10-CM

## 2025-07-22 DIAGNOSIS — D63.1 ANEMIA DUE TO CHRONIC KIDNEY DISEASE, UNSPECIFIED CKD STAGE: ICD-10-CM

## 2025-07-22 DIAGNOSIS — D46.9 MYELODYSPLASTIC SYNDROME (MULTI): ICD-10-CM

## 2025-07-22 DIAGNOSIS — B34.3 PARVOVIRUS B19 INFECTION: ICD-10-CM

## 2025-07-22 LAB
BASOPHILS # BLD AUTO: 0.05 X10*3/UL (ref 0–0.1)
BASOPHILS NFR BLD AUTO: 2.1 %
EOSINOPHIL # BLD AUTO: 0.1 X10*3/UL (ref 0–0.4)
EOSINOPHIL NFR BLD AUTO: 4.3 %
ERYTHROCYTE [DISTWIDTH] IN BLOOD BY AUTOMATED COUNT: 24.5 % (ref 11.5–14.5)
HCT VFR BLD AUTO: 24.7 % (ref 36–46)
HGB BLD-MCNC: 7.8 G/DL (ref 12–16)
IMM GRANULOCYTES # BLD AUTO: 0 X10*3/UL (ref 0–0.5)
IMM GRANULOCYTES NFR BLD AUTO: 0 % (ref 0–0.9)
LYMPHOCYTES # BLD AUTO: 0.81 X10*3/UL (ref 0.8–3)
LYMPHOCYTES NFR BLD AUTO: 34.5 %
MCH RBC QN AUTO: 31 PG (ref 26–34)
MCHC RBC AUTO-ENTMCNC: 31.6 G/DL (ref 32–36)
MCV RBC AUTO: 98 FL (ref 80–100)
MONOCYTES # BLD AUTO: 0.26 X10*3/UL (ref 0.05–0.8)
MONOCYTES NFR BLD AUTO: 11.1 %
NEUTROPHILS # BLD AUTO: 1.13 X10*3/UL (ref 1.6–5.5)
NEUTROPHILS NFR BLD AUTO: 48 %
NRBC BLD-RTO: ABNORMAL /100{WBCS}
PLATELET # BLD AUTO: 167 X10*3/UL (ref 150–450)
RBC # BLD AUTO: 2.52 X10*6/UL (ref 4–5.2)
WBC # BLD AUTO: 2.4 X10*3/UL (ref 4.4–11.3)

## 2025-07-22 PROCEDURE — 2500000004 HC RX 250 GENERAL PHARMACY W/ HCPCS (ALT 636 FOR OP/ED): Mod: JZ,TB | Performed by: INTERNAL MEDICINE

## 2025-07-22 PROCEDURE — 85025 COMPLETE CBC W/AUTO DIFF WBC: CPT

## 2025-07-22 PROCEDURE — 96365 THER/PROPH/DIAG IV INF INIT: CPT | Mod: INF

## 2025-07-22 PROCEDURE — 2500000001 HC RX 250 WO HCPCS SELF ADMINISTERED DRUGS (ALT 637 FOR MEDICARE OP): Performed by: INTERNAL MEDICINE

## 2025-07-22 PROCEDURE — 96366 THER/PROPH/DIAG IV INF ADDON: CPT | Mod: INF

## 2025-07-22 RX ORDER — DIPHENHYDRAMINE HYDROCHLORIDE 50 MG/ML
50 INJECTION, SOLUTION INTRAMUSCULAR; INTRAVENOUS AS NEEDED
OUTPATIENT
Start: 2025-07-29

## 2025-07-22 RX ORDER — DIPHENHYDRAMINE HCL 25 MG
25 CAPSULE ORAL ONCE
Status: COMPLETED | OUTPATIENT
Start: 2025-07-22 | End: 2025-07-22

## 2025-07-22 RX ORDER — ACETAMINOPHEN 325 MG/1
650 TABLET ORAL ONCE
OUTPATIENT
Start: 2025-07-29

## 2025-07-22 RX ORDER — HEPARIN 100 UNIT/ML
500 SYRINGE INTRAVENOUS AS NEEDED
OUTPATIENT
Start: 2025-07-22

## 2025-07-22 RX ORDER — EPINEPHRINE 0.3 MG/.3ML
0.3 INJECTION SUBCUTANEOUS EVERY 5 MIN PRN
Status: DISCONTINUED | OUTPATIENT
Start: 2025-07-22 | End: 2025-07-22 | Stop reason: HOSPADM

## 2025-07-22 RX ORDER — FAMOTIDINE 10 MG/ML
20 INJECTION, SOLUTION INTRAVENOUS ONCE AS NEEDED
OUTPATIENT
Start: 2025-07-29

## 2025-07-22 RX ORDER — ALBUTEROL SULFATE 0.83 MG/ML
3 SOLUTION RESPIRATORY (INHALATION) AS NEEDED
OUTPATIENT
Start: 2025-07-29

## 2025-07-22 RX ORDER — DIPHENHYDRAMINE HYDROCHLORIDE 50 MG/ML
50 INJECTION, SOLUTION INTRAMUSCULAR; INTRAVENOUS AS NEEDED
Status: DISCONTINUED | OUTPATIENT
Start: 2025-07-22 | End: 2025-07-22 | Stop reason: HOSPADM

## 2025-07-22 RX ORDER — ONDANSETRON HYDROCHLORIDE 2 MG/ML
4 INJECTION, SOLUTION INTRAVENOUS ONCE
Status: DISCONTINUED | OUTPATIENT
Start: 2025-07-22 | End: 2025-07-22 | Stop reason: HOSPADM

## 2025-07-22 RX ORDER — ONDANSETRON HYDROCHLORIDE 2 MG/ML
4 INJECTION, SOLUTION INTRAVENOUS ONCE
Status: CANCELLED | OUTPATIENT
Start: 2025-07-29 | End: 2025-07-29

## 2025-07-22 RX ORDER — FAMOTIDINE 10 MG/ML
20 INJECTION, SOLUTION INTRAVENOUS ONCE AS NEEDED
Status: DISCONTINUED | OUTPATIENT
Start: 2025-07-22 | End: 2025-07-22 | Stop reason: HOSPADM

## 2025-07-22 RX ORDER — ALBUTEROL SULFATE 0.83 MG/ML
3 SOLUTION RESPIRATORY (INHALATION) AS NEEDED
Status: DISCONTINUED | OUTPATIENT
Start: 2025-07-22 | End: 2025-07-22 | Stop reason: HOSPADM

## 2025-07-22 RX ORDER — ACETAMINOPHEN 325 MG/1
650 TABLET ORAL ONCE
Status: COMPLETED | OUTPATIENT
Start: 2025-07-22 | End: 2025-07-22

## 2025-07-22 RX ORDER — HEPARIN SODIUM,PORCINE/PF 10 UNIT/ML
50 SYRINGE (ML) INTRAVENOUS AS NEEDED
OUTPATIENT
Start: 2025-07-22

## 2025-07-22 RX ORDER — DIPHENHYDRAMINE HCL 25 MG
25 CAPSULE ORAL ONCE
OUTPATIENT
Start: 2025-07-29

## 2025-07-22 RX ORDER — EPINEPHRINE 0.3 MG/.3ML
0.3 INJECTION SUBCUTANEOUS EVERY 5 MIN PRN
OUTPATIENT
Start: 2025-07-29

## 2025-07-22 RX ADMIN — ACETAMINOPHEN 650 MG: 325 TABLET ORAL at 10:08

## 2025-07-22 RX ADMIN — DIPHENHYDRAMINE HYDROCHLORIDE 25 MG: 25 CAPSULE ORAL at 10:08

## 2025-07-22 RX ADMIN — IMMUNE GLOBULIN INFUSION (HUMAN) 30 G: 100 INJECTION, SOLUTION INTRAVENOUS; SUBCUTANEOUS at 10:25

## 2025-07-22 ASSESSMENT — PAIN SCALES - GENERAL: PAINLEVEL_OUTOF10: 0-NO PAIN

## 2025-07-22 NOTE — PROGRESS NOTES
Pt arrived awake, alert, oriented, unlabored breaths. Pt reports feeling well today without s/sx of acute illness. Pt does report that she had a fall without injury this morning, in which she states is due to weakness and her feet slid on the floor. Pt also with noted HTN upon arrival. Dr Meidna notified, with him assessing pt at BS. Dr Medina verbalized ok to treat. Pt here for last ordered IVIG infusion in which received and tolerated well as of thus far. Pt with follow up appointment set for 8/5/25, no further questions or concerns, at this time.

## 2025-07-24 ENCOUNTER — APPOINTMENT (OUTPATIENT)
Dept: INFUSION THERAPY | Facility: HOSPITAL | Age: 80
End: 2025-07-24
Payer: MEDICARE

## 2025-07-29 ENCOUNTER — LAB (OUTPATIENT)
Dept: LAB | Facility: HOSPITAL | Age: 80
End: 2025-07-29
Payer: MEDICARE

## 2025-07-29 ENCOUNTER — TELEPHONE (OUTPATIENT)
Dept: HEMATOLOGY/ONCOLOGY | Facility: CLINIC | Age: 80
End: 2025-07-29
Payer: MEDICARE

## 2025-07-29 DIAGNOSIS — D50.9 IRON DEFICIENCY ANEMIA, UNSPECIFIED IRON DEFICIENCY ANEMIA TYPE: ICD-10-CM

## 2025-07-29 DIAGNOSIS — D46.9 MYELODYSPLASTIC SYNDROME (MULTI): ICD-10-CM

## 2025-07-29 DIAGNOSIS — D64.9 ANEMIA, UNSPECIFIED TYPE: ICD-10-CM

## 2025-07-29 DIAGNOSIS — B34.3 PARVOVIRUS B19 INFECTION: ICD-10-CM

## 2025-07-29 LAB
ABO GROUP (TYPE) IN BLOOD: NORMAL
ANTIBODY SCREEN: NORMAL
BASO STIPL BLD QL SMEAR: PRESENT
BASOPHILS # BLD AUTO: 0.02 X10*3/UL (ref 0–0.1)
BASOPHILS NFR BLD AUTO: 0.7 %
DACRYOCYTES BLD QL SMEAR: NORMAL
EOSINOPHIL # BLD AUTO: 0.07 X10*3/UL (ref 0–0.4)
EOSINOPHIL NFR BLD AUTO: 2.5 %
ERYTHROCYTE [DISTWIDTH] IN BLOOD BY AUTOMATED COUNT: 24.8 % (ref 11.5–14.5)
HCT VFR BLD AUTO: 18.3 % (ref 36–46)
HGB BLD-MCNC: 6 G/DL (ref 12–16)
HYPOCHROMIA BLD QL SMEAR: NORMAL
IMM GRANULOCYTES # BLD AUTO: 0.01 X10*3/UL (ref 0–0.5)
IMM GRANULOCYTES NFR BLD AUTO: 0.4 % (ref 0–0.9)
LYMPHOCYTES # BLD AUTO: 1.06 X10*3/UL (ref 0.8–3)
LYMPHOCYTES NFR BLD AUTO: 38.1 %
MCH RBC QN AUTO: 31.7 PG (ref 26–34)
MCHC RBC AUTO-ENTMCNC: 32.8 G/DL (ref 32–36)
MCV RBC AUTO: 97 FL (ref 80–100)
MONOCYTES # BLD AUTO: 0.29 X10*3/UL (ref 0.05–0.8)
MONOCYTES NFR BLD AUTO: 10.4 %
NEUTROPHILS # BLD AUTO: 1.33 X10*3/UL (ref 1.6–5.5)
NEUTROPHILS NFR BLD AUTO: 47.9 %
NRBC BLD-RTO: ABNORMAL /100{WBCS}
OVALOCYTES BLD QL SMEAR: NORMAL
PLATELET # BLD AUTO: 212 X10*3/UL (ref 150–450)
POLYCHROMASIA BLD QL SMEAR: NORMAL
RBC # BLD AUTO: 1.89 X10*6/UL (ref 4–5.2)
RBC MORPH BLD: NORMAL
RH FACTOR (ANTIGEN D): NORMAL
WBC # BLD AUTO: 2.8 X10*3/UL (ref 4.4–11.3)

## 2025-07-29 PROCEDURE — 86900 BLOOD TYPING SEROLOGIC ABO: CPT

## 2025-07-29 PROCEDURE — 36415 COLL VENOUS BLD VENIPUNCTURE: CPT

## 2025-07-29 PROCEDURE — 85025 COMPLETE CBC W/AUTO DIFF WBC: CPT

## 2025-07-29 PROCEDURE — 86747 PARVOVIRUS ANTIBODY: CPT

## 2025-07-29 PROCEDURE — 86922 COMPATIBILITY TEST ANTIGLOB: CPT

## 2025-07-29 NOTE — TELEPHONE ENCOUNTER
Phone call to Tana to let her know she does need to come to the office today for lab work. She is on the schedule for a blood transfusion for thurs.  Tana voiced understanding

## 2025-07-31 ENCOUNTER — INFUSION (OUTPATIENT)
Dept: INFUSION THERAPY | Facility: HOSPITAL | Age: 80
End: 2025-07-31
Payer: MEDICARE

## 2025-07-31 VITALS
DIASTOLIC BLOOD PRESSURE: 60 MMHG | OXYGEN SATURATION: 94 % | RESPIRATION RATE: 18 BRPM | TEMPERATURE: 98.4 F | SYSTOLIC BLOOD PRESSURE: 161 MMHG | HEART RATE: 68 BPM

## 2025-07-31 DIAGNOSIS — D46.9 MYELODYSPLASTIC SYNDROME (MULTI): ICD-10-CM

## 2025-07-31 PROCEDURE — P9040 RBC LEUKOREDUCED IRRADIATED: HCPCS

## 2025-07-31 PROCEDURE — 36430 TRANSFUSION BLD/BLD COMPNT: CPT

## 2025-07-31 PROCEDURE — 86902 BLOOD TYPE ANTIGEN DONOR EA: CPT

## 2025-07-31 RX ORDER — HEPARIN SODIUM,PORCINE/PF 10 UNIT/ML
50 SYRINGE (ML) INTRAVENOUS AS NEEDED
OUTPATIENT
Start: 2025-07-31

## 2025-07-31 RX ORDER — HEPARIN 100 UNIT/ML
500 SYRINGE INTRAVENOUS AS NEEDED
OUTPATIENT
Start: 2025-07-31

## 2025-08-01 LAB
B19V IGG SER IA-ACNC: 7.76 IV
B19V IGM SER IA-ACNC: 0.4 IV

## 2025-08-05 ENCOUNTER — OFFICE VISIT (OUTPATIENT)
Dept: HEMATOLOGY/ONCOLOGY | Facility: CLINIC | Age: 80
End: 2025-08-05
Payer: MEDICARE

## 2025-08-05 VITALS
OXYGEN SATURATION: 97 % | DIASTOLIC BLOOD PRESSURE: 72 MMHG | SYSTOLIC BLOOD PRESSURE: 189 MMHG | HEART RATE: 69 BPM | TEMPERATURE: 98.2 F | RESPIRATION RATE: 16 BRPM

## 2025-08-05 DIAGNOSIS — D46.9 MYELODYSPLASTIC SYNDROME (MULTI): ICD-10-CM

## 2025-08-05 DIAGNOSIS — D50.9 IRON DEFICIENCY ANEMIA, UNSPECIFIED IRON DEFICIENCY ANEMIA TYPE: ICD-10-CM

## 2025-08-05 DIAGNOSIS — D63.1 ANEMIA DUE TO CHRONIC KIDNEY DISEASE, UNSPECIFIED CKD STAGE: ICD-10-CM

## 2025-08-05 DIAGNOSIS — N18.9 ANEMIA DUE TO CHRONIC KIDNEY DISEASE, UNSPECIFIED CKD STAGE: ICD-10-CM

## 2025-08-05 DIAGNOSIS — D64.9 ANEMIA, UNSPECIFIED TYPE: ICD-10-CM

## 2025-08-05 LAB
ABO GROUP (TYPE) IN BLOOD: NORMAL
ANTIBODY SCREEN: NORMAL
BASOPHILS # BLD AUTO: 0.04 X10*3/UL (ref 0–0.1)
BASOPHILS NFR BLD AUTO: 1.2 %
DACRYOCYTES BLD QL SMEAR: NORMAL
EOSINOPHIL # BLD AUTO: 0.12 X10*3/UL (ref 0–0.4)
EOSINOPHIL NFR BLD AUTO: 3.5 %
ERYTHROCYTE [DISTWIDTH] IN BLOOD BY AUTOMATED COUNT: 21.8 % (ref 11.5–14.5)
HCT VFR BLD AUTO: 21.3 % (ref 36–46)
HGB BLD-MCNC: 6.8 G/DL (ref 12–16)
HYPOCHROMIA BLD QL SMEAR: NORMAL
IMM GRANULOCYTES # BLD AUTO: 0 X10*3/UL (ref 0–0.5)
IMM GRANULOCYTES NFR BLD AUTO: 0 % (ref 0–0.9)
LYMPHOCYTES # BLD AUTO: 1.27 X10*3/UL (ref 0.8–3)
LYMPHOCYTES NFR BLD AUTO: 37 %
MCH RBC QN AUTO: 32.1 PG (ref 26–34)
MCHC RBC AUTO-ENTMCNC: 31.9 G/DL (ref 32–36)
MCV RBC AUTO: 101 FL (ref 80–100)
MONOCYTES # BLD AUTO: 0.41 X10*3/UL (ref 0.05–0.8)
MONOCYTES NFR BLD AUTO: 12 %
NEUTROPHILS # BLD AUTO: 1.59 X10*3/UL (ref 1.6–5.5)
NEUTROPHILS NFR BLD AUTO: 46.3 %
NRBC BLD-RTO: ABNORMAL /100{WBCS}
OVALOCYTES BLD QL SMEAR: NORMAL
PLATELET # BLD AUTO: 210 X10*3/UL (ref 150–450)
POLYCHROMASIA BLD QL SMEAR: NORMAL
RBC # BLD AUTO: 2.12 X10*6/UL (ref 4–5.2)
RBC MORPH BLD: NORMAL
RH FACTOR (ANTIGEN D): NORMAL
WBC # BLD AUTO: 3.4 X10*3/UL (ref 4.4–11.3)

## 2025-08-05 PROCEDURE — 3077F SYST BP >= 140 MM HG: CPT | Performed by: INTERNAL MEDICINE

## 2025-08-05 PROCEDURE — 86922 COMPATIBILITY TEST ANTIGLOB: CPT

## 2025-08-05 PROCEDURE — 85025 COMPLETE CBC W/AUTO DIFF WBC: CPT | Performed by: INTERNAL MEDICINE

## 2025-08-05 PROCEDURE — 99213 OFFICE O/P EST LOW 20 MIN: CPT | Performed by: INTERNAL MEDICINE

## 2025-08-05 PROCEDURE — 36415 COLL VENOUS BLD VENIPUNCTURE: CPT | Performed by: INTERNAL MEDICINE

## 2025-08-05 PROCEDURE — 1126F AMNT PAIN NOTED NONE PRSNT: CPT | Performed by: INTERNAL MEDICINE

## 2025-08-05 PROCEDURE — 1159F MED LIST DOCD IN RCRD: CPT | Performed by: INTERNAL MEDICINE

## 2025-08-05 PROCEDURE — 1160F RVW MEDS BY RX/DR IN RCRD: CPT | Performed by: INTERNAL MEDICINE

## 2025-08-05 PROCEDURE — 3078F DIAST BP <80 MM HG: CPT | Performed by: INTERNAL MEDICINE

## 2025-08-05 PROCEDURE — 86901 BLOOD TYPING SEROLOGIC RH(D): CPT | Performed by: INTERNAL MEDICINE

## 2025-08-05 ASSESSMENT — PAIN SCALES - GENERAL: PAINLEVEL_OUTOF10: 0-NO PAIN

## 2025-08-05 NOTE — PATIENT INSTRUCTIONS
Follow up visit for history of chronic anemia related to myelodysplastic syndrome and end stage renal disease.     Blood transfusion on Thursday 8/7/25    Bone marrow biopsy and labs on Tuesday 8/12/25 - arrive at 0815.     Follow up with Dr. Medina about 2 weeks after bone marrow biopsy to review results.     Call the office at [phone] with questions or needs.  You may also contact your nurse or doctor with non-urgent issues by sending a [employer] message.  Reminders  Medicine refills: call or send a [employer] message to request medicine refills at least 5 to 7 days before you run out.  Labs: You will need labs drawn every Tuesday

## 2025-08-07 ENCOUNTER — DOCUMENTATION (OUTPATIENT)
Dept: HEMATOLOGY/ONCOLOGY | Facility: CLINIC | Age: 80
End: 2025-08-07

## 2025-08-07 ENCOUNTER — INFUSION (OUTPATIENT)
Dept: INFUSION THERAPY | Facility: HOSPITAL | Age: 80
End: 2025-08-07
Payer: MEDICARE

## 2025-08-07 VITALS
RESPIRATION RATE: 18 BRPM | HEART RATE: 78 BPM | OXYGEN SATURATION: 93 % | TEMPERATURE: 98 F | SYSTOLIC BLOOD PRESSURE: 174 MMHG | DIASTOLIC BLOOD PRESSURE: 69 MMHG

## 2025-08-07 DIAGNOSIS — D46.9 MYELODYSPLASTIC SYNDROME (MULTI): ICD-10-CM

## 2025-08-07 PROCEDURE — P9040 RBC LEUKOREDUCED IRRADIATED: HCPCS

## 2025-08-07 PROCEDURE — 36430 TRANSFUSION BLD/BLD COMPNT: CPT

## 2025-08-07 RX ORDER — HEPARIN 100 UNIT/ML
500 SYRINGE INTRAVENOUS AS NEEDED
OUTPATIENT
Start: 2025-08-07

## 2025-08-07 RX ORDER — HEPARIN SODIUM,PORCINE/PF 10 UNIT/ML
50 SYRINGE (ML) INTRAVENOUS AS NEEDED
OUTPATIENT
Start: 2025-08-07

## 2025-08-07 ASSESSMENT — ENCOUNTER SYMPTOMS
OCCASIONAL FEELINGS OF UNSTEADINESS: 0
LOSS OF SENSATION IN FEET: 0
DEPRESSION: 0

## 2025-08-07 NOTE — PROGRESS NOTES
Notified Arlene in hematology of bmbx scheduled 8/12/25 at 0830 in [facility] [place] office.

## 2025-08-12 ENCOUNTER — HOSPITAL ENCOUNTER (INPATIENT)
Facility: HOSPITAL | Age: 80
LOS: 2 days | Discharge: HOME | End: 2025-08-15
Attending: STUDENT IN AN ORGANIZED HEALTH CARE EDUCATION/TRAINING PROGRAM | Admitting: INTERNAL MEDICINE
Payer: MEDICARE

## 2025-08-12 ENCOUNTER — APPOINTMENT (OUTPATIENT)
Dept: RADIOLOGY | Facility: HOSPITAL | Age: 80
End: 2025-08-12
Payer: MEDICARE

## 2025-08-12 ENCOUNTER — APPOINTMENT (OUTPATIENT)
Dept: CARDIOLOGY | Facility: HOSPITAL | Age: 80
End: 2025-08-12
Payer: MEDICARE

## 2025-08-12 ENCOUNTER — PROCEDURE VISIT (OUTPATIENT)
Dept: HEMATOLOGY/ONCOLOGY | Facility: CLINIC | Age: 80
End: 2025-08-12
Payer: MEDICARE

## 2025-08-12 VITALS
WEIGHT: 141.98 LBS | HEIGHT: 61 IN | BODY MASS INDEX: 26.81 KG/M2 | HEART RATE: 76 BPM | DIASTOLIC BLOOD PRESSURE: 78 MMHG | TEMPERATURE: 99 F | SYSTOLIC BLOOD PRESSURE: 210 MMHG | OXYGEN SATURATION: 94 % | RESPIRATION RATE: 16 BRPM

## 2025-08-12 DIAGNOSIS — D64.9 ANEMIA, UNSPECIFIED TYPE: ICD-10-CM

## 2025-08-12 DIAGNOSIS — I50.9 ACUTE ON CHRONIC CONGESTIVE HEART FAILURE, UNSPECIFIED HEART FAILURE TYPE: ICD-10-CM

## 2025-08-12 DIAGNOSIS — D63.1 ANEMIA DUE TO CHRONIC KIDNEY DISEASE, UNSPECIFIED CKD STAGE: ICD-10-CM

## 2025-08-12 DIAGNOSIS — D50.9 IRON DEFICIENCY ANEMIA, UNSPECIFIED IRON DEFICIENCY ANEMIA TYPE: ICD-10-CM

## 2025-08-12 DIAGNOSIS — E87.70 HYPERVOLEMIA, UNSPECIFIED HYPERVOLEMIA TYPE: ICD-10-CM

## 2025-08-12 DIAGNOSIS — D46.9 MYELODYSPLASTIC SYNDROME (MULTI): ICD-10-CM

## 2025-08-12 DIAGNOSIS — S81.801A OPEN WOUND OF RIGHT LOWER EXTREMITY, INITIAL ENCOUNTER: ICD-10-CM

## 2025-08-12 DIAGNOSIS — D46.9 MYELODYSPLASTIC SYNDROME (MULTI): Chronic | ICD-10-CM

## 2025-08-12 DIAGNOSIS — N18.6 ESRD (END STAGE RENAL DISEASE) ON DIALYSIS (MULTI): Chronic | ICD-10-CM

## 2025-08-12 DIAGNOSIS — N18.9 ANEMIA DUE TO CHRONIC KIDNEY DISEASE, UNSPECIFIED CKD STAGE: ICD-10-CM

## 2025-08-12 DIAGNOSIS — Z99.2 ESRD (END STAGE RENAL DISEASE) ON DIALYSIS (MULTI): Chronic | ICD-10-CM

## 2025-08-12 DIAGNOSIS — D64.9 ANEMIA: Primary | ICD-10-CM

## 2025-08-12 LAB
ABO GROUP (TYPE) IN BLOOD: NORMAL
ALBUMIN SERPL BCP-MCNC: 4 G/DL (ref 3.4–5)
ALP SERPL-CCNC: 67 U/L (ref 33–136)
ALT SERPL W P-5'-P-CCNC: 16 U/L (ref 7–45)
ANION GAP SERPL CALC-SCNC: 11 MMOL/L (ref 10–20)
ANTIBODY SCREEN: NORMAL
APPEARANCE UR: CLEAR
AST SERPL W P-5'-P-CCNC: 18 U/L (ref 9–39)
BACTERIA #/AREA URNS AUTO: ABNORMAL /HPF
BASE EXCESS BLDV CALC-SCNC: 7.2 MMOL/L (ref -2–3)
BASOPHILS # BLD AUTO: 0.03 X10*3/UL (ref 0–0.1)
BASOPHILS # BLD AUTO: 0.05 X10*3/UL (ref 0–0.1)
BASOPHILS NFR BLD AUTO: 1.1 %
BASOPHILS NFR BLD AUTO: 1.6 %
BILIRUB DIRECT SERPL-MCNC: 0.2 MG/DL (ref 0–0.3)
BILIRUB SERPL-MCNC: 1 MG/DL (ref 0–1.2)
BILIRUB UR STRIP.AUTO-MCNC: NEGATIVE MG/DL
BNP SERPL-MCNC: 1580 PG/ML (ref 0–99)
BODY TEMPERATURE: 37 DEGREES CELSIUS
BUN SERPL-MCNC: 16 MG/DL (ref 6–23)
CALCIUM SERPL-MCNC: 9.3 MG/DL (ref 8.6–10.3)
CARDIAC TROPONIN I PNL SERPL HS: 18 NG/L (ref 0–13)
CARDIAC TROPONIN I PNL SERPL HS: 18 NG/L (ref 0–13)
CHLORIDE SERPL-SCNC: 99 MMOL/L (ref 98–107)
CO2 SERPL-SCNC: 30 MMOL/L (ref 21–32)
COLOR UR: YELLOW
CREAT SERPL-MCNC: 2.04 MG/DL (ref 0.5–1.05)
EGFRCR SERPLBLD CKD-EPI 2021: 24 ML/MIN/1.73M*2
EOSINOPHIL # BLD AUTO: 0.06 X10*3/UL (ref 0–0.4)
EOSINOPHIL # BLD AUTO: 0.09 X10*3/UL (ref 0–0.4)
EOSINOPHIL NFR BLD AUTO: 2.2 %
EOSINOPHIL NFR BLD AUTO: 2.9 %
ERYTHROCYTE [DISTWIDTH] IN BLOOD BY AUTOMATED COUNT: 23.7 % (ref 11.5–14.5)
ERYTHROCYTE [DISTWIDTH] IN BLOOD BY AUTOMATED COUNT: 23.9 % (ref 11.5–14.5)
GLUCOSE BLD MANUAL STRIP-MCNC: 129 MG/DL (ref 74–99)
GLUCOSE BLD MANUAL STRIP-MCNC: 131 MG/DL (ref 74–99)
GLUCOSE BLD MANUAL STRIP-MCNC: 147 MG/DL (ref 74–99)
GLUCOSE SERPL-MCNC: 159 MG/DL (ref 74–99)
GLUCOSE UR STRIP.AUTO-MCNC: ABNORMAL MG/DL
HCO3 BLDV-SCNC: 32.6 MMOL/L (ref 22–26)
HCT VFR BLD AUTO: 24 % (ref 36–46)
HCT VFR BLD AUTO: 24.9 % (ref 36–46)
HGB BLD-MCNC: 7.9 G/DL (ref 12–16)
HGB BLD-MCNC: 8.2 G/DL (ref 12–16)
IMM GRANULOCYTES # BLD AUTO: 0.01 X10*3/UL (ref 0–0.5)
IMM GRANULOCYTES # BLD AUTO: 0.01 X10*3/UL (ref 0–0.5)
IMM GRANULOCYTES NFR BLD AUTO: 0.3 % (ref 0–0.9)
IMM GRANULOCYTES NFR BLD AUTO: 0.4 % (ref 0–0.9)
INHALED O2 CONCENTRATION: 21 %
KETONES UR STRIP.AUTO-MCNC: NEGATIVE MG/DL
LACTATE SERPL-SCNC: 1 MMOL/L (ref 0.4–2)
LEUKOCYTE ESTERASE UR QL STRIP.AUTO: NEGATIVE
LYMPHOCYTES # BLD AUTO: 0.84 X10*3/UL (ref 0.8–3)
LYMPHOCYTES # BLD AUTO: 0.88 X10*3/UL (ref 0.8–3)
LYMPHOCYTES NFR BLD AUTO: 27.5 %
LYMPHOCYTES NFR BLD AUTO: 31.8 %
MCH RBC QN AUTO: 30.4 PG (ref 26–34)
MCH RBC QN AUTO: 31.2 PG (ref 26–34)
MCHC RBC AUTO-ENTMCNC: 32.9 G/DL (ref 32–36)
MCHC RBC AUTO-ENTMCNC: 32.9 G/DL (ref 32–36)
MCV RBC AUTO: 92 FL (ref 80–100)
MCV RBC AUTO: 95 FL (ref 80–100)
MONOCYTES # BLD AUTO: 0.29 X10*3/UL (ref 0.05–0.8)
MONOCYTES # BLD AUTO: 0.33 X10*3/UL (ref 0.05–0.8)
MONOCYTES NFR BLD AUTO: 10.5 %
MONOCYTES NFR BLD AUTO: 10.8 %
NEUTROPHILS # BLD AUTO: 1.5 X10*3/UL (ref 1.6–5.5)
NEUTROPHILS # BLD AUTO: 1.74 X10*3/UL (ref 1.6–5.5)
NEUTROPHILS NFR BLD AUTO: 54 %
NEUTROPHILS NFR BLD AUTO: 56.9 %
NITRITE UR QL STRIP.AUTO: NEGATIVE
NRBC BLD-RTO: 0 /100 WBCS (ref 0–0)
NRBC BLD-RTO: ABNORMAL /100{WBCS}
OXYHGB MFR BLDV: 57.5 % (ref 45–75)
PCO2 BLDV: 48 MM HG (ref 41–51)
PH BLDV: 7.44 PH (ref 7.33–7.43)
PH UR STRIP.AUTO: 8 [PH]
PLATELET # BLD AUTO: 212 X10*3/UL (ref 150–450)
PLATELET # BLD AUTO: 213 X10*3/UL (ref 150–450)
PO2 BLDV: 35 MM HG (ref 35–45)
POTASSIUM SERPL-SCNC: 3.6 MMOL/L (ref 3.5–5.3)
PROT SERPL-MCNC: 6.7 G/DL (ref 6.4–8.2)
PROT UR STRIP.AUTO-MCNC: ABNORMAL MG/DL
RBC # BLD AUTO: 2.6 X10*6/UL (ref 4–5.2)
RBC # BLD AUTO: 2.63 X10*6/UL (ref 4–5.2)
RBC # UR STRIP.AUTO: NEGATIVE MG/DL
RBC #/AREA URNS AUTO: ABNORMAL /HPF
RH FACTOR (ANTIGEN D): NORMAL
SAO2 % BLDV: 59 % (ref 45–75)
SODIUM SERPL-SCNC: 136 MMOL/L (ref 136–145)
SP GR UR STRIP.AUTO: 1.02
SQUAMOUS #/AREA URNS AUTO: ABNORMAL /HPF
TSH SERPL-ACNC: 2.26 MIU/L (ref 0.44–3.98)
UROBILINOGEN UR STRIP.AUTO-MCNC: NORMAL MG/DL
WBC # BLD AUTO: 2.8 X10*3/UL (ref 4.4–11.3)
WBC # BLD AUTO: 3.1 X10*3/UL (ref 4.4–11.3)
WBC #/AREA URNS AUTO: ABNORMAL /HPF

## 2025-08-12 PROCEDURE — 93005 ELECTROCARDIOGRAM TRACING: CPT

## 2025-08-12 PROCEDURE — 80053 COMPREHEN METABOLIC PANEL: CPT | Performed by: STUDENT IN AN ORGANIZED HEALTH CARE EDUCATION/TRAINING PROGRAM

## 2025-08-12 PROCEDURE — G0378 HOSPITAL OBSERVATION PER HR: HCPCS

## 2025-08-12 PROCEDURE — 36415 COLL VENOUS BLD VENIPUNCTURE: CPT | Performed by: STUDENT IN AN ORGANIZED HEALTH CARE EDUCATION/TRAINING PROGRAM

## 2025-08-12 PROCEDURE — 2500000004 HC RX 250 GENERAL PHARMACY W/ HCPCS (ALT 636 FOR OP/ED)

## 2025-08-12 PROCEDURE — 82810 BLOOD GASES O2 SAT ONLY: CPT | Performed by: STUDENT IN AN ORGANIZED HEALTH CARE EDUCATION/TRAINING PROGRAM

## 2025-08-12 PROCEDURE — 36415 COLL VENOUS BLD VENIPUNCTURE: CPT | Performed by: INTERNAL MEDICINE

## 2025-08-12 PROCEDURE — 99213 OFFICE O/P EST LOW 20 MIN: CPT | Performed by: INTERNAL MEDICINE

## 2025-08-12 PROCEDURE — 99285 EMERGENCY DEPT VISIT HI MDM: CPT | Performed by: STUDENT IN AN ORGANIZED HEALTH CARE EDUCATION/TRAINING PROGRAM

## 2025-08-12 PROCEDURE — 71045 X-RAY EXAM CHEST 1 VIEW: CPT

## 2025-08-12 PROCEDURE — 2500000001 HC RX 250 WO HCPCS SELF ADMINISTERED DRUGS (ALT 637 FOR MEDICARE OP): Performed by: STUDENT IN AN ORGANIZED HEALTH CARE EDUCATION/TRAINING PROGRAM

## 2025-08-12 PROCEDURE — 82947 ASSAY GLUCOSE BLOOD QUANT: CPT

## 2025-08-12 PROCEDURE — 84484 ASSAY OF TROPONIN QUANT: CPT | Performed by: STUDENT IN AN ORGANIZED HEALTH CARE EDUCATION/TRAINING PROGRAM

## 2025-08-12 PROCEDURE — 86922 COMPATIBILITY TEST ANTIGLOB: CPT

## 2025-08-12 PROCEDURE — 71045 X-RAY EXAM CHEST 1 VIEW: CPT | Performed by: RADIOLOGY

## 2025-08-12 PROCEDURE — 2500000001 HC RX 250 WO HCPCS SELF ADMINISTERED DRUGS (ALT 637 FOR MEDICARE OP)

## 2025-08-12 PROCEDURE — 85025 COMPLETE CBC W/AUTO DIFF WBC: CPT | Performed by: INTERNAL MEDICINE

## 2025-08-12 PROCEDURE — 83880 ASSAY OF NATRIURETIC PEPTIDE: CPT | Performed by: STUDENT IN AN ORGANIZED HEALTH CARE EDUCATION/TRAINING PROGRAM

## 2025-08-12 PROCEDURE — 86901 BLOOD TYPING SEROLOGIC RH(D): CPT | Performed by: STUDENT IN AN ORGANIZED HEALTH CARE EDUCATION/TRAINING PROGRAM

## 2025-08-12 PROCEDURE — 85025 COMPLETE CBC W/AUTO DIFF WBC: CPT | Performed by: STUDENT IN AN ORGANIZED HEALTH CARE EDUCATION/TRAINING PROGRAM

## 2025-08-12 PROCEDURE — 84443 ASSAY THYROID STIM HORMONE: CPT | Performed by: STUDENT IN AN ORGANIZED HEALTH CARE EDUCATION/TRAINING PROGRAM

## 2025-08-12 PROCEDURE — 96372 THER/PROPH/DIAG INJ SC/IM: CPT

## 2025-08-12 PROCEDURE — 81001 URINALYSIS AUTO W/SCOPE: CPT | Performed by: STUDENT IN AN ORGANIZED HEALTH CARE EDUCATION/TRAINING PROGRAM

## 2025-08-12 PROCEDURE — 83605 ASSAY OF LACTIC ACID: CPT | Performed by: STUDENT IN AN ORGANIZED HEALTH CARE EDUCATION/TRAINING PROGRAM

## 2025-08-12 PROCEDURE — 99222 1ST HOSP IP/OBS MODERATE 55: CPT

## 2025-08-12 RX ORDER — HYDRALAZINE HYDROCHLORIDE 25 MG/1
25 TABLET, FILM COATED ORAL 2 TIMES DAILY
Status: DISCONTINUED | OUTPATIENT
Start: 2025-08-12 | End: 2025-08-15 | Stop reason: HOSPADM

## 2025-08-12 RX ORDER — ALBUTEROL SULFATE 90 UG/1
2 INHALANT RESPIRATORY (INHALATION) EVERY 4 HOURS PRN
Status: DISCONTINUED | OUTPATIENT
Start: 2025-08-12 | End: 2025-08-15 | Stop reason: HOSPADM

## 2025-08-12 RX ORDER — PREGABALIN 50 MG/1
100 CAPSULE ORAL 2 TIMES DAILY
Status: DISCONTINUED | OUTPATIENT
Start: 2025-08-12 | End: 2025-08-15 | Stop reason: HOSPADM

## 2025-08-12 RX ORDER — HEPARIN SODIUM 5000 [USP'U]/ML
5000 INJECTION, SOLUTION INTRAVENOUS; SUBCUTANEOUS EVERY 8 HOURS SCHEDULED
Status: DISCONTINUED | OUTPATIENT
Start: 2025-08-12 | End: 2025-08-15 | Stop reason: HOSPADM

## 2025-08-12 RX ORDER — DEXTROSE 50 % IN WATER (D50W) INTRAVENOUS SYRINGE
25
Status: DISCONTINUED | OUTPATIENT
Start: 2025-08-12 | End: 2025-08-15 | Stop reason: HOSPADM

## 2025-08-12 RX ORDER — LOSARTAN POTASSIUM 50 MG/1
50 TABLET ORAL 2 TIMES DAILY
Status: DISCONTINUED | OUTPATIENT
Start: 2025-08-12 | End: 2025-08-15 | Stop reason: HOSPADM

## 2025-08-12 RX ORDER — CARVEDILOL 25 MG/1
25 TABLET ORAL 2 TIMES DAILY
Status: DISCONTINUED | OUTPATIENT
Start: 2025-08-12 | End: 2025-08-15 | Stop reason: HOSPADM

## 2025-08-12 RX ORDER — ONDANSETRON HYDROCHLORIDE 2 MG/ML
4 INJECTION, SOLUTION INTRAVENOUS EVERY 6 HOURS PRN
Status: DISCONTINUED | OUTPATIENT
Start: 2025-08-12 | End: 2025-08-12

## 2025-08-12 RX ORDER — PREGABALIN 100 MG/1
100 CAPSULE ORAL 2 TIMES DAILY
COMMUNITY
End: 2025-08-21 | Stop reason: WASHOUT

## 2025-08-12 RX ORDER — TALC
6 POWDER (GRAM) TOPICAL NIGHTLY PRN
Status: DISCONTINUED | OUTPATIENT
Start: 2025-08-12 | End: 2025-08-15 | Stop reason: HOSPADM

## 2025-08-12 RX ORDER — LOSARTAN POTASSIUM 50 MG/1
50 TABLET ORAL ONCE
Status: COMPLETED | OUTPATIENT
Start: 2025-08-12 | End: 2025-08-12

## 2025-08-12 RX ORDER — INSULIN LISPRO 100 [IU]/ML
0-10 INJECTION, SOLUTION INTRAVENOUS; SUBCUTANEOUS
Status: DISCONTINUED | OUTPATIENT
Start: 2025-08-12 | End: 2025-08-15 | Stop reason: HOSPADM

## 2025-08-12 RX ORDER — DEXTROSE 50 % IN WATER (D50W) INTRAVENOUS SYRINGE
12.5
Status: DISCONTINUED | OUTPATIENT
Start: 2025-08-12 | End: 2025-08-15 | Stop reason: HOSPADM

## 2025-08-12 RX ORDER — CARVEDILOL 6.25 MG/1
25 TABLET ORAL ONCE
Status: COMPLETED | OUTPATIENT
Start: 2025-08-12 | End: 2025-08-12

## 2025-08-12 RX ORDER — POLYETHYLENE GLYCOL 3350 17 G/17G
17 POWDER, FOR SOLUTION ORAL DAILY PRN
Status: DISCONTINUED | OUTPATIENT
Start: 2025-08-12 | End: 2025-08-15 | Stop reason: HOSPADM

## 2025-08-12 RX ORDER — GUAIFENESIN/DEXTROMETHORPHAN 100-10MG/5
5 SYRUP ORAL EVERY 4 HOURS PRN
Status: DISCONTINUED | OUTPATIENT
Start: 2025-08-12 | End: 2025-08-15 | Stop reason: HOSPADM

## 2025-08-12 RX ORDER — ATORVASTATIN CALCIUM 10 MG/1
10 TABLET, FILM COATED ORAL DAILY
Status: DISCONTINUED | OUTPATIENT
Start: 2025-08-12 | End: 2025-08-15 | Stop reason: HOSPADM

## 2025-08-12 RX ADMIN — CARVEDILOL 25 MG: 6.25 TABLET, FILM COATED ORAL at 10:25

## 2025-08-12 RX ADMIN — HYDRALAZINE HYDROCHLORIDE 25 MG: 25 TABLET ORAL at 21:05

## 2025-08-12 RX ADMIN — LOSARTAN POTASSIUM 50 MG: 50 TABLET, FILM COATED ORAL at 10:26

## 2025-08-12 RX ADMIN — LOSARTAN POTASSIUM 50 MG: 50 TABLET, FILM COATED ORAL at 21:06

## 2025-08-12 RX ADMIN — HEPARIN SODIUM 5000 UNITS: 5000 INJECTION, SOLUTION INTRAVENOUS; SUBCUTANEOUS at 21:05

## 2025-08-12 RX ADMIN — PREGABALIN 100 MG: 50 CAPSULE ORAL at 21:05

## 2025-08-12 RX ADMIN — CARVEDILOL 25 MG: 25 TABLET, FILM COATED ORAL at 21:05

## 2025-08-12 SDOH — ECONOMIC STABILITY: INCOME INSECURITY: IN THE PAST 12 MONTHS HAS THE ELECTRIC, GAS, OIL, OR WATER COMPANY THREATENED TO SHUT OFF SERVICES IN YOUR HOME?: NO

## 2025-08-12 SDOH — SOCIAL STABILITY: SOCIAL INSECURITY: HAVE YOU HAD THOUGHTS OF HARMING ANYONE ELSE?: NO

## 2025-08-12 SDOH — ECONOMIC STABILITY: HOUSING INSECURITY: IN THE LAST 12 MONTHS, WAS THERE A TIME WHEN YOU WERE NOT ABLE TO PAY THE MORTGAGE OR RENT ON TIME?: NO

## 2025-08-12 SDOH — SOCIAL STABILITY: SOCIAL INSECURITY: WITHIN THE LAST YEAR, HAVE YOU BEEN HUMILIATED OR EMOTIONALLY ABUSED IN OTHER WAYS BY YOUR PARTNER OR EX-PARTNER?: NO

## 2025-08-12 SDOH — SOCIAL STABILITY: SOCIAL INSECURITY: WITHIN THE LAST YEAR, HAVE YOU BEEN AFRAID OF YOUR PARTNER OR EX-PARTNER?: NO

## 2025-08-12 SDOH — ECONOMIC STABILITY: HOUSING INSECURITY: AT ANY TIME IN THE PAST 12 MONTHS, WERE YOU HOMELESS OR LIVING IN A SHELTER (INCLUDING NOW)?: NO

## 2025-08-12 SDOH — ECONOMIC STABILITY: FOOD INSECURITY: WITHIN THE PAST 12 MONTHS, YOU WORRIED THAT YOUR FOOD WOULD RUN OUT BEFORE YOU GOT THE MONEY TO BUY MORE.: NEVER TRUE

## 2025-08-12 SDOH — SOCIAL STABILITY: SOCIAL INSECURITY: ABUSE: ADULT

## 2025-08-12 SDOH — SOCIAL STABILITY: SOCIAL INSECURITY: WERE YOU ABLE TO COMPLETE ALL THE BEHAVIORAL HEALTH SCREENINGS?: YES

## 2025-08-12 SDOH — ECONOMIC STABILITY: HOUSING INSECURITY: IN THE PAST 12 MONTHS, HOW MANY TIMES HAVE YOU MOVED WHERE YOU WERE LIVING?: 0

## 2025-08-12 SDOH — ECONOMIC STABILITY: FOOD INSECURITY: WITHIN THE PAST 12 MONTHS, THE FOOD YOU BOUGHT JUST DIDN'T LAST AND YOU DIDN'T HAVE MONEY TO GET MORE.: NEVER TRUE

## 2025-08-12 SDOH — ECONOMIC STABILITY: FOOD INSECURITY: HOW HARD IS IT FOR YOU TO PAY FOR THE VERY BASICS LIKE FOOD, HOUSING, MEDICAL CARE, AND HEATING?: NOT VERY HARD

## 2025-08-12 SDOH — ECONOMIC STABILITY: TRANSPORTATION INSECURITY: IN THE PAST 12 MONTHS, HAS LACK OF TRANSPORTATION KEPT YOU FROM MEDICAL APPOINTMENTS OR FROM GETTING MEDICATIONS?: NO

## 2025-08-12 ASSESSMENT — COGNITIVE AND FUNCTIONAL STATUS - GENERAL
DRESSING REGULAR UPPER BODY CLOTHING: A LITTLE
MOBILITY SCORE: 21
TOILETING: A LITTLE
DAILY ACTIVITIY SCORE: 20
MOBILITY SCORE: 21
WALKING IN HOSPITAL ROOM: A LITTLE
TOILETING: A LITTLE
STANDING UP FROM CHAIR USING ARMS: A LITTLE
PATIENT BASELINE BEDBOUND: NO
DAILY ACTIVITIY SCORE: 20
CLIMB 3 TO 5 STEPS WITH RAILING: A LITTLE
DRESSING REGULAR UPPER BODY CLOTHING: A LITTLE
HELP NEEDED FOR BATHING: A LITTLE
HELP NEEDED FOR BATHING: A LITTLE
CLIMB 3 TO 5 STEPS WITH RAILING: A LITTLE
DRESSING REGULAR LOWER BODY CLOTHING: A LITTLE
DRESSING REGULAR LOWER BODY CLOTHING: A LITTLE
WALKING IN HOSPITAL ROOM: A LITTLE
STANDING UP FROM CHAIR USING ARMS: A LITTLE

## 2025-08-12 ASSESSMENT — ACTIVITIES OF DAILY LIVING (ADL)
LACK_OF_TRANSPORTATION: NO
HEARING - RIGHT EAR: FUNCTIONAL
HEARING - LEFT EAR: FUNCTIONAL
TOILETING: NEEDS ASSISTANCE
PATIENT'S MEMORY ADEQUATE TO SAFELY COMPLETE DAILY ACTIVITIES?: YES
GROOMING: INDEPENDENT
ASSISTIVE_DEVICE: DENTURES LOWER;DENTURES UPPER;WALKER
FEEDING YOURSELF: INDEPENDENT
WALKS IN HOME: NEEDS ASSISTANCE
BATHING: NEEDS ASSISTANCE
JUDGMENT_ADEQUATE_SAFELY_COMPLETE_DAILY_ACTIVITIES: YES
ADEQUATE_TO_COMPLETE_ADL: YES
DRESSING YOURSELF: NEEDS ASSISTANCE

## 2025-08-12 ASSESSMENT — ENCOUNTER SYMPTOMS
COUGH: 0
ABDOMINAL PAIN: 0
WEAKNESS: 0
BACK PAIN: 0
ACTIVITY CHANGE: 1
CHILLS: 0
NAUSEA: 0
JOINT SWELLING: 0
WOUND: 0
VOMITING: 0
FEVER: 0
FATIGUE: 0
WHEEZING: 0
CONSTIPATION: 0
HEADACHES: 0
HEMATURIA: 0
CHEST TIGHTNESS: 0
APPETITE CHANGE: 1
SHORTNESS OF BREATH: 0
PALPITATIONS: 0
DIARRHEA: 0
TREMORS: 0
FLANK PAIN: 0

## 2025-08-12 ASSESSMENT — PAIN SCALES - GENERAL: PAINLEVEL_OUTOF10: 0 - NO PAIN

## 2025-08-12 ASSESSMENT — LIFESTYLE VARIABLES
HAVE PEOPLE ANNOYED YOU BY CRITICIZING YOUR DRINKING: NO
HOW OFTEN DO YOU HAVE 6 OR MORE DRINKS ON ONE OCCASION: NEVER
HOW OFTEN DO YOU HAVE A DRINK CONTAINING ALCOHOL: NEVER
AUDIT-C TOTAL SCORE: 0
EVER HAD A DRINK FIRST THING IN THE MORNING TO STEADY YOUR NERVES TO GET RID OF A HANGOVER: NO
HAVE YOU EVER FELT YOU SHOULD CUT DOWN ON YOUR DRINKING: NO
SKIP TO QUESTIONS 9-10: 1
AUDIT-C TOTAL SCORE: 0
HOW MANY STANDARD DRINKS CONTAINING ALCOHOL DO YOU HAVE ON A TYPICAL DAY: PATIENT DOES NOT DRINK
EVER FELT BAD OR GUILTY ABOUT YOUR DRINKING: NO
TOTAL SCORE: 0

## 2025-08-12 ASSESSMENT — PAIN - FUNCTIONAL ASSESSMENT: PAIN_FUNCTIONAL_ASSESSMENT: 0-10

## 2025-08-13 ENCOUNTER — APPOINTMENT (OUTPATIENT)
Dept: DIALYSIS | Facility: HOSPITAL | Age: 80
End: 2025-08-13
Payer: MEDICARE

## 2025-08-13 PROBLEM — D64.9 ANEMIA: Status: ACTIVE | Noted: 2025-08-13

## 2025-08-13 LAB
ALBUMIN SERPL BCP-MCNC: 3.4 G/DL (ref 3.4–5)
ALP SERPL-CCNC: 64 U/L (ref 33–136)
ALT SERPL W P-5'-P-CCNC: 14 U/L (ref 7–45)
ANION GAP SERPL CALC-SCNC: 15 MMOL/L (ref 10–20)
AST SERPL W P-5'-P-CCNC: 17 U/L (ref 9–39)
ATRIAL RATE: 70 BPM
BILIRUB SERPL-MCNC: 0.8 MG/DL (ref 0–1.2)
BLOOD EXPIRATION DATE: NORMAL
BUN SERPL-MCNC: 22 MG/DL (ref 6–23)
CALCIUM SERPL-MCNC: 8.9 MG/DL (ref 8.6–10.3)
CHLORIDE SERPL-SCNC: 99 MMOL/L (ref 98–107)
CO2 SERPL-SCNC: 30 MMOL/L (ref 21–32)
CREAT SERPL-MCNC: 2.42 MG/DL (ref 0.5–1.05)
DISPENSE STATUS: NORMAL
EGFRCR SERPLBLD CKD-EPI 2021: 20 ML/MIN/1.73M*2
ERYTHROCYTE [DISTWIDTH] IN BLOOD BY AUTOMATED COUNT: 24.3 % (ref 11.5–14.5)
GLUCOSE BLD MANUAL STRIP-MCNC: 103 MG/DL (ref 74–99)
GLUCOSE BLD MANUAL STRIP-MCNC: 113 MG/DL (ref 74–99)
GLUCOSE BLD MANUAL STRIP-MCNC: 195 MG/DL (ref 74–99)
GLUCOSE BLD MANUAL STRIP-MCNC: 266 MG/DL (ref 74–99)
GLUCOSE SERPL-MCNC: 96 MG/DL (ref 74–99)
HCT VFR BLD AUTO: 21.4 % (ref 36–46)
HGB BLD-MCNC: 6.8 G/DL (ref 12–16)
HOLD SPECIMEN: NORMAL
MCH RBC QN AUTO: 30.2 PG (ref 26–34)
MCHC RBC AUTO-ENTMCNC: 31.8 G/DL (ref 32–36)
MCV RBC AUTO: 95 FL (ref 80–100)
NRBC BLD-RTO: 0 /100 WBCS (ref 0–0)
P AXIS: 66 DEGREES
PLATELET # BLD AUTO: 193 X10*3/UL (ref 150–450)
POTASSIUM SERPL-SCNC: 3.7 MMOL/L (ref 3.5–5.3)
PR INTERVAL: 165 MS
PRODUCT BLOOD TYPE: 6200
PRODUCT CODE: NORMAL
PROT SERPL-MCNC: 5.6 G/DL (ref 6.4–8.2)
Q ONSET: 249 MS
QRS COUNT: 11 BEATS
QRS DURATION: 92 MS
QT INTERVAL: 478 MS
QTC CALCULATION(BAZETT): 516 MS
QTC FREDERICIA: 503 MS
R AXIS: 28 DEGREES
RBC # BLD AUTO: 2.25 X10*6/UL (ref 4–5.2)
SODIUM SERPL-SCNC: 140 MMOL/L (ref 136–145)
T AXIS: 33 DEGREES
T OFFSET: 488 MS
UNIT ABO: NORMAL
UNIT NUMBER: NORMAL
UNIT RH: NORMAL
UNIT VOLUME: 350
VENTRICULAR RATE: 70 BPM
WBC # BLD AUTO: 3 X10*3/UL (ref 4.4–11.3)
XM INTEP: NORMAL

## 2025-08-13 PROCEDURE — 36430 TRANSFUSION BLD/BLD COMPNT: CPT

## 2025-08-13 PROCEDURE — P9040 RBC LEUKOREDUCED IRRADIATED: HCPCS

## 2025-08-13 PROCEDURE — 5A1D70Z PERFORMANCE OF URINARY FILTRATION, INTERMITTENT, LESS THAN 6 HOURS PER DAY: ICD-10-PCS | Performed by: INTERNAL MEDICINE

## 2025-08-13 PROCEDURE — 36415 COLL VENOUS BLD VENIPUNCTURE: CPT

## 2025-08-13 PROCEDURE — 96372 THER/PROPH/DIAG INJ SC/IM: CPT

## 2025-08-13 PROCEDURE — 2500000004 HC RX 250 GENERAL PHARMACY W/ HCPCS (ALT 636 FOR OP/ED)

## 2025-08-13 PROCEDURE — 2500000004 HC RX 250 GENERAL PHARMACY W/ HCPCS (ALT 636 FOR OP/ED): Performed by: INTERNAL MEDICINE

## 2025-08-13 PROCEDURE — 2500000002 HC RX 250 W HCPCS SELF ADMINISTERED DRUGS (ALT 637 FOR MEDICARE OP, ALT 636 FOR OP/ED)

## 2025-08-13 PROCEDURE — 80053 COMPREHEN METABOLIC PANEL: CPT

## 2025-08-13 PROCEDURE — 85027 COMPLETE CBC AUTOMATED: CPT

## 2025-08-13 PROCEDURE — 1200000002 HC GENERAL ROOM WITH TELEMETRY DAILY

## 2025-08-13 PROCEDURE — 2500000001 HC RX 250 WO HCPCS SELF ADMINISTERED DRUGS (ALT 637 FOR MEDICARE OP)

## 2025-08-13 PROCEDURE — 82947 ASSAY GLUCOSE BLOOD QUANT: CPT

## 2025-08-13 PROCEDURE — 85018 HEMOGLOBIN: CPT

## 2025-08-13 PROCEDURE — 90937 HEMODIALYSIS REPEATED EVAL: CPT

## 2025-08-13 PROCEDURE — 8010000001 HC DIALYSIS - HEMODIALYSIS PER DAY

## 2025-08-13 PROCEDURE — 99221 1ST HOSP IP/OBS SF/LOW 40: CPT | Performed by: INTERNAL MEDICINE

## 2025-08-13 RX ORDER — HEPARIN SODIUM 1000 [USP'U]/ML
2000 INJECTION, SOLUTION INTRAVENOUS; SUBCUTANEOUS
Status: DISCONTINUED | OUTPATIENT
Start: 2025-08-13 | End: 2025-08-15 | Stop reason: HOSPADM

## 2025-08-13 RX ORDER — HEPARIN SODIUM 1000 [USP'U]/ML
2000 INJECTION, SOLUTION INTRAVENOUS; SUBCUTANEOUS AS NEEDED
Status: CANCELLED | OUTPATIENT
Start: 2025-08-13

## 2025-08-13 RX ADMIN — HEPARIN SODIUM 5000 UNITS: 5000 INJECTION, SOLUTION INTRAVENOUS; SUBCUTANEOUS at 14:06

## 2025-08-13 RX ADMIN — HEPARIN SODIUM 1600 UNITS: 1000 INJECTION, SOLUTION INTRAVENOUS; SUBCUTANEOUS at 12:50

## 2025-08-13 RX ADMIN — CARVEDILOL 25 MG: 25 TABLET, FILM COATED ORAL at 20:59

## 2025-08-13 RX ADMIN — CARVEDILOL 25 MG: 25 TABLET, FILM COATED ORAL at 08:22

## 2025-08-13 RX ADMIN — HEPARIN SODIUM 5000 UNITS: 5000 INJECTION, SOLUTION INTRAVENOUS; SUBCUTANEOUS at 06:24

## 2025-08-13 RX ADMIN — LOSARTAN POTASSIUM 50 MG: 50 TABLET, FILM COATED ORAL at 20:59

## 2025-08-13 RX ADMIN — HEPARIN SODIUM 5000 UNITS: 5000 INJECTION, SOLUTION INTRAVENOUS; SUBCUTANEOUS at 21:02

## 2025-08-13 RX ADMIN — PREGABALIN 100 MG: 50 CAPSULE ORAL at 21:00

## 2025-08-13 RX ADMIN — HYDRALAZINE HYDROCHLORIDE 25 MG: 25 TABLET ORAL at 08:22

## 2025-08-13 RX ADMIN — ATORVASTATIN CALCIUM 10 MG: 10 TABLET, FILM COATED ORAL at 08:22

## 2025-08-13 RX ADMIN — LOSARTAN POTASSIUM 50 MG: 50 TABLET, FILM COATED ORAL at 08:22

## 2025-08-13 RX ADMIN — PREGABALIN 100 MG: 50 CAPSULE ORAL at 08:22

## 2025-08-13 RX ADMIN — HEPARIN SODIUM 1600 UNITS: 1000 INJECTION, SOLUTION INTRAVENOUS; SUBCUTANEOUS at 12:49

## 2025-08-13 RX ADMIN — INSULIN LISPRO 6 UNITS: 100 INJECTION, SOLUTION INTRAVENOUS; SUBCUTANEOUS at 16:57

## 2025-08-13 RX ADMIN — HYDRALAZINE HYDROCHLORIDE 25 MG: 25 TABLET ORAL at 20:59

## 2025-08-13 RX ADMIN — INSULIN LISPRO 2 UNITS: 100 INJECTION, SOLUTION INTRAVENOUS; SUBCUTANEOUS at 21:01

## 2025-08-13 ASSESSMENT — COGNITIVE AND FUNCTIONAL STATUS - GENERAL
CLIMB 3 TO 5 STEPS WITH RAILING: A LOT
MOVING FROM LYING ON BACK TO SITTING ON SIDE OF FLAT BED WITH BEDRAILS: A LITTLE
DRESSING REGULAR UPPER BODY CLOTHING: A LITTLE
MOVING TO AND FROM BED TO CHAIR: A LITTLE
STANDING UP FROM CHAIR USING ARMS: A LITTLE
TOILETING: A LITTLE
TURNING FROM BACK TO SIDE WHILE IN FLAT BAD: A LITTLE
DRESSING REGULAR LOWER BODY CLOTHING: A LOT
WALKING IN HOSPITAL ROOM: A LITTLE
MOBILITY SCORE: 17
DAILY ACTIVITIY SCORE: 19
HELP NEEDED FOR BATHING: A LITTLE

## 2025-08-13 ASSESSMENT — ACTIVITIES OF DAILY LIVING (ADL): LACK_OF_TRANSPORTATION: NO

## 2025-08-13 ASSESSMENT — PAIN SCALES - GENERAL
PAINLEVEL_OUTOF10: 0 - NO PAIN

## 2025-08-13 ASSESSMENT — PAIN - FUNCTIONAL ASSESSMENT
PAIN_FUNCTIONAL_ASSESSMENT: 0-10

## 2025-08-14 ENCOUNTER — APPOINTMENT (OUTPATIENT)
Dept: INFUSION THERAPY | Facility: HOSPITAL | Age: 80
End: 2025-08-14
Payer: MEDICARE

## 2025-08-14 LAB
ANION GAP SERPL CALC-SCNC: 10 MMOL/L (ref 10–20)
BUN SERPL-MCNC: 20 MG/DL (ref 6–23)
CALCIUM SERPL-MCNC: 8.7 MG/DL (ref 8.6–10.3)
CHLORIDE SERPL-SCNC: 98 MMOL/L (ref 98–107)
CO2 SERPL-SCNC: 30 MMOL/L (ref 21–32)
CREAT SERPL-MCNC: 2.13 MG/DL (ref 0.5–1.05)
EGFRCR SERPLBLD CKD-EPI 2021: 23 ML/MIN/1.73M*2
ERYTHROCYTE [DISTWIDTH] IN BLOOD BY AUTOMATED COUNT: 21.8 % (ref 11.5–14.5)
GLUCOSE BLD MANUAL STRIP-MCNC: 133 MG/DL (ref 74–99)
GLUCOSE SERPL-MCNC: 112 MG/DL (ref 74–99)
HCT VFR BLD AUTO: 23.2 % (ref 36–46)
HCT VFR BLD AUTO: 24.7 % (ref 36–46)
HGB BLD-MCNC: 7.8 G/DL (ref 12–16)
HGB BLD-MCNC: 8.1 G/DL (ref 12–16)
MAGNESIUM SERPL-MCNC: 1.86 MG/DL (ref 1.6–2.4)
MCH RBC QN AUTO: 30.2 PG (ref 26–34)
MCHC RBC AUTO-ENTMCNC: 32.8 G/DL (ref 32–36)
MCV RBC AUTO: 92 FL (ref 80–100)
NRBC BLD-RTO: 0 /100 WBCS (ref 0–0)
PLATELET # BLD AUTO: 194 X10*3/UL (ref 150–450)
POTASSIUM SERPL-SCNC: 3.7 MMOL/L (ref 3.5–5.3)
RBC # BLD AUTO: 2.68 X10*6/UL (ref 4–5.2)
SODIUM SERPL-SCNC: 134 MMOL/L (ref 136–145)
WBC # BLD AUTO: 2.5 X10*3/UL (ref 4.4–11.3)

## 2025-08-14 PROCEDURE — 82947 ASSAY GLUCOSE BLOOD QUANT: CPT

## 2025-08-14 PROCEDURE — 99233 SBSQ HOSP IP/OBS HIGH 50: CPT | Performed by: INTERNAL MEDICINE

## 2025-08-14 PROCEDURE — 87081 CULTURE SCREEN ONLY: CPT | Mod: PORLAB | Performed by: INTERNAL MEDICINE

## 2025-08-14 PROCEDURE — 99223 1ST HOSP IP/OBS HIGH 75: CPT

## 2025-08-14 PROCEDURE — 2500000002 HC RX 250 W HCPCS SELF ADMINISTERED DRUGS (ALT 637 FOR MEDICARE OP, ALT 636 FOR OP/ED)

## 2025-08-14 PROCEDURE — 2500000001 HC RX 250 WO HCPCS SELF ADMINISTERED DRUGS (ALT 637 FOR MEDICARE OP)

## 2025-08-14 PROCEDURE — 85027 COMPLETE CBC AUTOMATED: CPT | Performed by: INTERNAL MEDICINE

## 2025-08-14 PROCEDURE — 36415 COLL VENOUS BLD VENIPUNCTURE: CPT | Performed by: INTERNAL MEDICINE

## 2025-08-14 PROCEDURE — 2500000004 HC RX 250 GENERAL PHARMACY W/ HCPCS (ALT 636 FOR OP/ED)

## 2025-08-14 PROCEDURE — 97165 OT EVAL LOW COMPLEX 30 MIN: CPT | Mod: GO

## 2025-08-14 PROCEDURE — 86902 BLOOD TYPE ANTIGEN DONOR EA: CPT

## 2025-08-14 PROCEDURE — 1200000002 HC GENERAL ROOM WITH TELEMETRY DAILY

## 2025-08-14 PROCEDURE — 97162 PT EVAL MOD COMPLEX 30 MIN: CPT | Mod: GP

## 2025-08-14 PROCEDURE — 80048 BASIC METABOLIC PNL TOTAL CA: CPT | Performed by: INTERNAL MEDICINE

## 2025-08-14 PROCEDURE — 83735 ASSAY OF MAGNESIUM: CPT | Performed by: INTERNAL MEDICINE

## 2025-08-14 RX ORDER — HEPARIN SODIUM,PORCINE/PF 10 UNIT/ML
50 SYRINGE (ML) INTRAVENOUS AS NEEDED
Status: DISCONTINUED | OUTPATIENT
Start: 2025-08-14 | End: 2025-08-14

## 2025-08-14 RX ORDER — HEPARIN SODIUM,PORCINE/PF 10 UNIT/ML
50 SYRINGE (ML) INTRAVENOUS EVERY 12 HOURS
Status: DISCONTINUED | OUTPATIENT
Start: 2025-08-14 | End: 2025-08-14

## 2025-08-14 RX ADMIN — CARVEDILOL 25 MG: 25 TABLET, FILM COATED ORAL at 21:18

## 2025-08-14 RX ADMIN — HEPARIN SODIUM 5000 UNITS: 5000 INJECTION, SOLUTION INTRAVENOUS; SUBCUTANEOUS at 15:20

## 2025-08-14 RX ADMIN — LOSARTAN POTASSIUM 50 MG: 50 TABLET, FILM COATED ORAL at 21:18

## 2025-08-14 RX ADMIN — PREGABALIN 100 MG: 50 CAPSULE ORAL at 09:19

## 2025-08-14 RX ADMIN — CARVEDILOL 25 MG: 25 TABLET, FILM COATED ORAL at 09:19

## 2025-08-14 RX ADMIN — HYDRALAZINE HYDROCHLORIDE 25 MG: 25 TABLET ORAL at 21:18

## 2025-08-14 RX ADMIN — ATORVASTATIN CALCIUM 10 MG: 10 TABLET, FILM COATED ORAL at 09:19

## 2025-08-14 RX ADMIN — LOSARTAN POTASSIUM 50 MG: 50 TABLET, FILM COATED ORAL at 09:19

## 2025-08-14 RX ADMIN — HEPARIN SODIUM 5000 UNITS: 5000 INJECTION, SOLUTION INTRAVENOUS; SUBCUTANEOUS at 21:18

## 2025-08-14 RX ADMIN — HYDRALAZINE HYDROCHLORIDE 25 MG: 25 TABLET ORAL at 09:19

## 2025-08-14 RX ADMIN — PREGABALIN 100 MG: 50 CAPSULE ORAL at 21:18

## 2025-08-14 RX ADMIN — HEPARIN SODIUM 5000 UNITS: 5000 INJECTION, SOLUTION INTRAVENOUS; SUBCUTANEOUS at 06:13

## 2025-08-14 RX ADMIN — INSULIN LISPRO 4 UNITS: 100 INJECTION, SOLUTION INTRAVENOUS; SUBCUTANEOUS at 22:15

## 2025-08-14 ASSESSMENT — COGNITIVE AND FUNCTIONAL STATUS - GENERAL
STANDING UP FROM CHAIR USING ARMS: A LITTLE
HELP NEEDED FOR BATHING: A LOT
DAILY ACTIVITIY SCORE: 13
PERSONAL GROOMING: A LITTLE
TURNING FROM BACK TO SIDE WHILE IN FLAT BAD: A LOT
TOILETING: A LITTLE
WALKING IN HOSPITAL ROOM: A LITTLE
TOILETING: TOTAL
HELP NEEDED FOR BATHING: A LITTLE
EATING MEALS: A LITTLE
DRESSING REGULAR UPPER BODY CLOTHING: A LOT
MOVING TO AND FROM BED TO CHAIR: A LITTLE
WALKING IN HOSPITAL ROOM: A LITTLE
MOVING TO AND FROM BED TO CHAIR: A LITTLE
MOVING FROM LYING ON BACK TO SITTING ON SIDE OF FLAT BED WITH BEDRAILS: A LOT
CLIMB 3 TO 5 STEPS WITH RAILING: A LOT
STANDING UP FROM CHAIR USING ARMS: A LITTLE
TURNING FROM BACK TO SIDE WHILE IN FLAT BAD: A LITTLE
DRESSING REGULAR UPPER BODY CLOTHING: A LITTLE
DRESSING REGULAR LOWER BODY CLOTHING: A LOT
MOVING FROM LYING ON BACK TO SITTING ON SIDE OF FLAT BED WITH BEDRAILS: A LITTLE
MOBILITY SCORE: 17
MOBILITY SCORE: 14
CLIMB 3 TO 5 STEPS WITH RAILING: TOTAL
DAILY ACTIVITIY SCORE: 19
DRESSING REGULAR LOWER BODY CLOTHING: A LOT

## 2025-08-14 ASSESSMENT — ENCOUNTER SYMPTOMS
CONFUSION: 0
WEAKNESS: 1
FATIGUE: 1
ARTHRALGIAS: 0

## 2025-08-14 ASSESSMENT — PAIN SCALES - GENERAL
PAINLEVEL_OUTOF10: 0 - NO PAIN

## 2025-08-14 ASSESSMENT — ACTIVITIES OF DAILY LIVING (ADL)
ADL_ASSISTANCE: INDEPENDENT
BATHING_ASSISTANCE: MAXIMAL
ADL_ASSISTANCE: INDEPENDENT

## 2025-08-14 ASSESSMENT — PAIN - FUNCTIONAL ASSESSMENT
PAIN_FUNCTIONAL_ASSESSMENT: 0-10
PAIN_FUNCTIONAL_ASSESSMENT: 0-10

## 2025-08-15 ENCOUNTER — APPOINTMENT (OUTPATIENT)
Dept: DIALYSIS | Facility: HOSPITAL | Age: 80
End: 2025-08-15
Payer: MEDICARE

## 2025-08-15 VITALS
HEIGHT: 60 IN | RESPIRATION RATE: 18 BRPM | SYSTOLIC BLOOD PRESSURE: 178 MMHG | TEMPERATURE: 97.4 F | BODY MASS INDEX: 27.68 KG/M2 | HEART RATE: 75 BPM | OXYGEN SATURATION: 97 % | WEIGHT: 141 LBS | DIASTOLIC BLOOD PRESSURE: 68 MMHG

## 2025-08-15 LAB
GLUCOSE BLD MANUAL STRIP-MCNC: 160 MG/DL (ref 74–99)
GLUCOSE BLD MANUAL STRIP-MCNC: 183 MG/DL (ref 74–99)
GLUCOSE BLD MANUAL STRIP-MCNC: 213 MG/DL (ref 74–99)
GLUCOSE BLD MANUAL STRIP-MCNC: 220 MG/DL (ref 74–99)

## 2025-08-15 PROCEDURE — 82947 ASSAY GLUCOSE BLOOD QUANT: CPT

## 2025-08-15 PROCEDURE — 8010000001 HC DIALYSIS - HEMODIALYSIS PER DAY

## 2025-08-15 PROCEDURE — 97116 GAIT TRAINING THERAPY: CPT | Mod: CQ,GP

## 2025-08-15 PROCEDURE — 99231 SBSQ HOSP IP/OBS SF/LOW 25: CPT

## 2025-08-15 PROCEDURE — 2500000004 HC RX 250 GENERAL PHARMACY W/ HCPCS (ALT 636 FOR OP/ED): Performed by: INTERNAL MEDICINE

## 2025-08-15 PROCEDURE — 2500000002 HC RX 250 W HCPCS SELF ADMINISTERED DRUGS (ALT 637 FOR MEDICARE OP, ALT 636 FOR OP/ED)

## 2025-08-15 PROCEDURE — 2500000001 HC RX 250 WO HCPCS SELF ADMINISTERED DRUGS (ALT 637 FOR MEDICARE OP)

## 2025-08-15 PROCEDURE — 99239 HOSP IP/OBS DSCHRG MGMT >30: CPT | Performed by: INTERNAL MEDICINE

## 2025-08-15 RX ADMIN — LOSARTAN POTASSIUM 50 MG: 50 TABLET, FILM COATED ORAL at 09:06

## 2025-08-15 RX ADMIN — HEPARIN SODIUM 1600 UNITS: 1000 INJECTION, SOLUTION INTRAVENOUS; SUBCUTANEOUS at 16:50

## 2025-08-15 RX ADMIN — PREGABALIN 100 MG: 50 CAPSULE ORAL at 09:06

## 2025-08-15 RX ADMIN — ATORVASTATIN CALCIUM 10 MG: 10 TABLET, FILM COATED ORAL at 09:06

## 2025-08-15 RX ADMIN — INSULIN LISPRO 2 UNITS: 100 INJECTION, SOLUTION INTRAVENOUS; SUBCUTANEOUS at 12:30

## 2025-08-15 RX ADMIN — HEPARIN SODIUM 1600 UNITS: 1000 INJECTION, SOLUTION INTRAVENOUS; SUBCUTANEOUS at 16:41

## 2025-08-15 RX ADMIN — HYDRALAZINE HYDROCHLORIDE 25 MG: 25 TABLET ORAL at 09:05

## 2025-08-15 RX ADMIN — INSULIN LISPRO 2 UNITS: 100 INJECTION, SOLUTION INTRAVENOUS; SUBCUTANEOUS at 09:06

## 2025-08-15 RX ADMIN — CARVEDILOL 25 MG: 25 TABLET, FILM COATED ORAL at 09:06

## 2025-08-15 ASSESSMENT — PAIN SCALES - GENERAL: PAINLEVEL_OUTOF10: 0 - NO PAIN

## 2025-08-15 ASSESSMENT — COGNITIVE AND FUNCTIONAL STATUS - GENERAL
CLIMB 3 TO 5 STEPS WITH RAILING: TOTAL
TURNING FROM BACK TO SIDE WHILE IN FLAT BAD: A LITTLE
MOBILITY SCORE: 15
MOVING FROM LYING ON BACK TO SITTING ON SIDE OF FLAT BED WITH BEDRAILS: A LITTLE
MOVING TO AND FROM BED TO CHAIR: A LITTLE
STANDING UP FROM CHAIR USING ARMS: A LITTLE
WALKING IN HOSPITAL ROOM: A LOT

## 2025-08-15 ASSESSMENT — ENCOUNTER SYMPTOMS
TROUBLE SWALLOWING: 0
CONFUSION: 0
ARTHRALGIAS: 0
WEAKNESS: 1

## 2025-08-16 LAB — STAPHYLOCOCCUS SPEC CULT: ABNORMAL

## 2025-08-18 ENCOUNTER — PATIENT OUTREACH (OUTPATIENT)
Dept: PRIMARY CARE | Facility: CLINIC | Age: 80
End: 2025-08-18
Payer: MEDICARE

## 2025-08-18 ENCOUNTER — TELEPHONE (OUTPATIENT)
Dept: HEMATOLOGY/ONCOLOGY | Facility: CLINIC | Age: 80
End: 2025-08-18
Payer: MEDICARE

## 2025-08-19 ENCOUNTER — APPOINTMENT (OUTPATIENT)
Dept: HEMATOLOGY/ONCOLOGY | Facility: CLINIC | Age: 80
End: 2025-08-19
Payer: MEDICARE

## 2025-08-19 ENCOUNTER — LAB (OUTPATIENT)
Dept: LAB | Facility: HOSPITAL | Age: 80
End: 2025-08-19
Payer: MEDICARE

## 2025-08-19 DIAGNOSIS — D64.9 ANEMIA, UNSPECIFIED TYPE: ICD-10-CM

## 2025-08-19 DIAGNOSIS — D50.9 IRON DEFICIENCY ANEMIA, UNSPECIFIED IRON DEFICIENCY ANEMIA TYPE: ICD-10-CM

## 2025-08-19 DIAGNOSIS — G57.93 NEUROPATHIC PAIN OF BOTH FEET: ICD-10-CM

## 2025-08-19 DIAGNOSIS — M79.18 MYOFASCIAL PAIN SYNDROME: Primary | ICD-10-CM

## 2025-08-19 DIAGNOSIS — D46.9 MYELODYSPLASTIC SYNDROME (MULTI): ICD-10-CM

## 2025-08-19 LAB
ABO GROUP (TYPE) IN BLOOD: NORMAL
ANTIBODY SCREEN: NORMAL
BASOPHILS # BLD AUTO: 0.03 X10*3/UL (ref 0–0.1)
BASOPHILS NFR BLD AUTO: 1.5 %
EOSINOPHIL # BLD AUTO: 0.1 X10*3/UL (ref 0–0.4)
EOSINOPHIL NFR BLD AUTO: 4.9 %
ERYTHROCYTE [DISTWIDTH] IN BLOOD BY AUTOMATED COUNT: 21.7 % (ref 11.5–14.5)
HCT VFR BLD AUTO: 22.1 % (ref 36–46)
HGB BLD-MCNC: 7 G/DL (ref 12–16)
IMM GRANULOCYTES # BLD AUTO: 0.01 X10*3/UL (ref 0–0.5)
IMM GRANULOCYTES NFR BLD AUTO: 0.5 % (ref 0–0.9)
LYMPHOCYTES # BLD AUTO: 0.77 X10*3/UL (ref 0.8–3)
LYMPHOCYTES NFR BLD AUTO: 37.4 %
MCH RBC QN AUTO: 30.4 PG (ref 26–34)
MCHC RBC AUTO-ENTMCNC: 31.7 G/DL (ref 32–36)
MCV RBC AUTO: 96 FL (ref 80–100)
MONOCYTES # BLD AUTO: 0.24 X10*3/UL (ref 0.05–0.8)
MONOCYTES NFR BLD AUTO: 11.7 %
NEUTROPHILS # BLD AUTO: 0.91 X10*3/UL (ref 1.6–5.5)
NEUTROPHILS NFR BLD AUTO: 44 %
NRBC BLD-RTO: ABNORMAL /100{WBCS}
OVALOCYTES BLD QL SMEAR: NORMAL
PLATELET # BLD AUTO: 172 X10*3/UL (ref 150–450)
RBC # BLD AUTO: 2.3 X10*6/UL (ref 4–5.2)
RBC MORPH BLD: NORMAL
RH FACTOR (ANTIGEN D): NORMAL
WBC # BLD AUTO: 2.1 X10*3/UL (ref 4.4–11.3)

## 2025-08-19 PROCEDURE — 86922 COMPATIBILITY TEST ANTIGLOB: CPT

## 2025-08-19 PROCEDURE — 85025 COMPLETE CBC W/AUTO DIFF WBC: CPT

## 2025-08-19 PROCEDURE — 86850 RBC ANTIBODY SCREEN: CPT

## 2025-08-19 PROCEDURE — 36415 COLL VENOUS BLD VENIPUNCTURE: CPT

## 2025-08-21 ENCOUNTER — INFUSION (OUTPATIENT)
Dept: INFUSION THERAPY | Facility: HOSPITAL | Age: 80
End: 2025-08-21
Payer: MEDICARE

## 2025-08-21 VITALS
RESPIRATION RATE: 20 BRPM | TEMPERATURE: 98.6 F | DIASTOLIC BLOOD PRESSURE: 80 MMHG | SYSTOLIC BLOOD PRESSURE: 188 MMHG | OXYGEN SATURATION: 94 % | HEART RATE: 72 BPM

## 2025-08-21 DIAGNOSIS — D46.9 MYELODYSPLASTIC SYNDROME (MULTI): ICD-10-CM

## 2025-08-21 PROCEDURE — P9040 RBC LEUKOREDUCED IRRADIATED: HCPCS

## 2025-08-21 PROCEDURE — 36430 TRANSFUSION BLD/BLD COMPNT: CPT

## 2025-08-21 RX ORDER — PREGABALIN 75 MG/1
75 CAPSULE ORAL 2 TIMES DAILY
Qty: 60 CAPSULE | Refills: 0 | Status: SHIPPED | OUTPATIENT
Start: 2025-08-21 | End: 2025-09-20

## 2025-08-21 RX ORDER — HEPARIN 100 UNIT/ML
500 SYRINGE INTRAVENOUS AS NEEDED
OUTPATIENT
Start: 2025-08-21

## 2025-08-21 RX ORDER — PREGABALIN 100 MG/1
100 CAPSULE ORAL 2 TIMES DAILY
Qty: 180 CAPSULE | Refills: 1 | OUTPATIENT
Start: 2025-08-21

## 2025-08-21 RX ORDER — HEPARIN SODIUM,PORCINE/PF 10 UNIT/ML
50 SYRINGE (ML) INTRAVENOUS AS NEEDED
OUTPATIENT
Start: 2025-08-21

## 2025-08-21 ASSESSMENT — ENCOUNTER SYMPTOMS
DEPRESSION: 0
OCCASIONAL FEELINGS OF UNSTEADINESS: 1
LOSS OF SENSATION IN FEET: 0

## 2025-08-25 ENCOUNTER — HOSPITAL ENCOUNTER (INPATIENT)
Facility: HOSPITAL | Age: 80
LOS: 3 days | Discharge: HOME HEALTH | DRG: 689 | End: 2025-08-29
Attending: STUDENT IN AN ORGANIZED HEALTH CARE EDUCATION/TRAINING PROGRAM | Admitting: STUDENT IN AN ORGANIZED HEALTH CARE EDUCATION/TRAINING PROGRAM
Payer: MEDICARE

## 2025-08-25 ENCOUNTER — APPOINTMENT (OUTPATIENT)
Dept: CARDIOLOGY | Facility: HOSPITAL | Age: 80
DRG: 689 | End: 2025-08-25
Payer: MEDICARE

## 2025-08-25 ENCOUNTER — APPOINTMENT (OUTPATIENT)
Dept: RADIOLOGY | Facility: HOSPITAL | Age: 80
DRG: 689 | End: 2025-08-25
Payer: MEDICARE

## 2025-08-25 PROBLEM — N39.0 URINARY TRACT INFECTION WITHOUT HEMATURIA, SITE UNSPECIFIED: Status: ACTIVE | Noted: 2025-08-25

## 2025-08-25 ASSESSMENT — ENCOUNTER SYMPTOMS
HEADACHES: 0
DIARRHEA: 0
DIAPHORESIS: 0
FLANK PAIN: 0
RHINORRHEA: 0
STRIDOR: 0
DIFFICULTY URINATING: 0
CHILLS: 0
CONSTIPATION: 0
APNEA: 0
ABDOMINAL DISTENTION: 0
HEMATURIA: 0
SORE THROAT: 0
SHORTNESS OF BREATH: 0
MYALGIAS: 0
NERVOUS/ANXIOUS: 0
FEVER: 0
JOINT SWELLING: 0
LIGHT-HEADEDNESS: 0
COUGH: 0
SINUS PAIN: 0
ABDOMINAL PAIN: 0
BACK PAIN: 0
DIZZINESS: 0
FATIGUE: 1
CHEST TIGHTNESS: 0
NAUSEA: 0
DECREASED CONCENTRATION: 0
APPETITE CHANGE: 0
ARTHRALGIAS: 0
WHEEZING: 0
PALPITATIONS: 0
CONFUSION: 0
DYSURIA: 0
ACTIVITY CHANGE: 0
FREQUENCY: 0
WEAKNESS: 0
COLOR CHANGE: 0
VOMITING: 0
WOUND: 0
NUMBNESS: 0
AGITATION: 0

## 2025-08-25 ASSESSMENT — LIFESTYLE VARIABLES
HAVE YOU EVER FELT YOU SHOULD CUT DOWN ON YOUR DRINKING: NO
EVER HAD A DRINK FIRST THING IN THE MORNING TO STEADY YOUR NERVES TO GET RID OF A HANGOVER: NO
HAVE PEOPLE ANNOYED YOU BY CRITICIZING YOUR DRINKING: NO
EVER FELT BAD OR GUILTY ABOUT YOUR DRINKING: NO
TOTAL SCORE: 0

## 2025-08-25 ASSESSMENT — PAIN SCALES - GENERAL
PAINLEVEL_OUTOF10: 0 - NO PAIN
PAINLEVEL_OUTOF10: 0 - NO PAIN

## 2025-08-25 ASSESSMENT — PAIN - FUNCTIONAL ASSESSMENT: PAIN_FUNCTIONAL_ASSESSMENT: 0-10

## 2025-08-26 ENCOUNTER — APPOINTMENT (OUTPATIENT)
Dept: PRIMARY CARE | Facility: CLINIC | Age: 80
End: 2025-08-26
Payer: MEDICARE

## 2025-08-26 SDOH — SOCIAL STABILITY: SOCIAL INSECURITY: WITHIN THE LAST YEAR, HAVE YOU BEEN AFRAID OF YOUR PARTNER OR EX-PARTNER?: NO

## 2025-08-26 SDOH — ECONOMIC STABILITY: FOOD INSECURITY: WITHIN THE PAST 12 MONTHS, THE FOOD YOU BOUGHT JUST DIDN'T LAST AND YOU DIDN'T HAVE MONEY TO GET MORE.: NEVER TRUE

## 2025-08-26 SDOH — SOCIAL STABILITY: SOCIAL INSECURITY: HAVE YOU HAD ANY THOUGHTS OF HARMING ANYONE ELSE?: NO

## 2025-08-26 SDOH — SOCIAL STABILITY: SOCIAL INSECURITY: HAVE YOU HAD THOUGHTS OF HARMING ANYONE ELSE?: NO

## 2025-08-26 SDOH — ECONOMIC STABILITY: FOOD INSECURITY: WITHIN THE PAST 12 MONTHS, YOU WORRIED THAT YOUR FOOD WOULD RUN OUT BEFORE YOU GOT THE MONEY TO BUY MORE.: NEVER TRUE

## 2025-08-26 SDOH — ECONOMIC STABILITY: INCOME INSECURITY: IN THE PAST 12 MONTHS HAS THE ELECTRIC, GAS, OIL, OR WATER COMPANY THREATENED TO SHUT OFF SERVICES IN YOUR HOME?: NO

## 2025-08-26 SDOH — SOCIAL STABILITY: SOCIAL INSECURITY: WITHIN THE LAST YEAR, HAVE YOU BEEN HUMILIATED OR EMOTIONALLY ABUSED IN OTHER WAYS BY YOUR PARTNER OR EX-PARTNER?: NO

## 2025-08-26 SDOH — SOCIAL STABILITY: SOCIAL INSECURITY: HAS ANYONE EVER THREATENED TO HURT YOUR FAMILY OR YOUR PETS?: NO

## 2025-08-26 SDOH — SOCIAL STABILITY: SOCIAL INSECURITY: DO YOU FEEL ANYONE HAS EXPLOITED OR TAKEN ADVANTAGE OF YOU FINANCIALLY OR OF YOUR PERSONAL PROPERTY?: NO

## 2025-08-26 SDOH — SOCIAL STABILITY: SOCIAL INSECURITY: WERE YOU ABLE TO COMPLETE ALL THE BEHAVIORAL HEALTH SCREENINGS?: YES

## 2025-08-26 SDOH — SOCIAL STABILITY: SOCIAL INSECURITY: DOES ANYONE TRY TO KEEP YOU FROM HAVING/CONTACTING OTHER FRIENDS OR DOING THINGS OUTSIDE YOUR HOME?: NO

## 2025-08-26 SDOH — SOCIAL STABILITY: SOCIAL INSECURITY: ARE YOU OR HAVE YOU BEEN THREATENED OR ABUSED PHYSICALLY, EMOTIONALLY, OR SEXUALLY BY ANYONE?: NO

## 2025-08-26 SDOH — SOCIAL STABILITY: SOCIAL INSECURITY: DO YOU FEEL UNSAFE GOING BACK TO THE PLACE WHERE YOU ARE LIVING?: NO

## 2025-08-26 SDOH — SOCIAL STABILITY: SOCIAL INSECURITY: ARE THERE ANY APPARENT SIGNS OF INJURIES/BEHAVIORS THAT COULD BE RELATED TO ABUSE/NEGLECT?: NO

## 2025-08-26 SDOH — SOCIAL STABILITY: SOCIAL INSECURITY: ABUSE: ADULT

## 2025-08-26 ASSESSMENT — ACTIVITIES OF DAILY LIVING (ADL)
TOILETING: NEEDS ASSISTANCE
GROOMING: NEEDS ASSISTANCE
ADL_ASSISTANCE: INDEPENDENT
BATHING_ASSISTANCE: MAXIMAL
LACK_OF_TRANSPORTATION: NO
FEEDING YOURSELF: INDEPENDENT
HEARING - LEFT EAR: FUNCTIONAL
ASSISTIVE_DEVICE: EYEGLASSES;DENTURES LOWER;DENTURES UPPER
BATHING: NEEDS ASSISTANCE
JUDGMENT_ADEQUATE_SAFELY_COMPLETE_DAILY_ACTIVITIES: YES
WALKS IN HOME: NEEDS ASSISTANCE
PATIENT'S MEMORY ADEQUATE TO SAFELY COMPLETE DAILY ACTIVITIES?: YES
HEARING - RIGHT EAR: FUNCTIONAL
LACK_OF_TRANSPORTATION: NO
DRESSING YOURSELF: NEEDS ASSISTANCE
ADEQUATE_TO_COMPLETE_ADL: YES

## 2025-08-26 ASSESSMENT — COGNITIVE AND FUNCTIONAL STATUS - GENERAL
DRESSING REGULAR UPPER BODY CLOTHING: A LITTLE
MOVING FROM LYING ON BACK TO SITTING ON SIDE OF FLAT BED WITH BEDRAILS: A LITTLE
DRESSING REGULAR LOWER BODY CLOTHING: A LOT
HELP NEEDED FOR BATHING: A LITTLE
PERSONAL GROOMING: A LITTLE
CLIMB 3 TO 5 STEPS WITH RAILING: TOTAL
DRESSING REGULAR LOWER BODY CLOTHING: A LITTLE
CLIMB 3 TO 5 STEPS WITH RAILING: A LOT
MOBILITY SCORE: 14
TOILETING: TOTAL
HELP NEEDED FOR BATHING: A LITTLE
MOVING TO AND FROM BED TO CHAIR: A LOT
PERSONAL GROOMING: A LITTLE
CLIMB 3 TO 5 STEPS WITH RAILING: A LOT
MOBILITY SCORE: 16
MOVING TO AND FROM BED TO CHAIR: A LITTLE
TURNING FROM BACK TO SIDE WHILE IN FLAT BAD: A LOT
WALKING IN HOSPITAL ROOM: TOTAL
DRESSING REGULAR LOWER BODY CLOTHING: A LITTLE
HELP NEEDED FOR BATHING: A LITTLE
PATIENT BASELINE BEDBOUND: NO
STANDING UP FROM CHAIR USING ARMS: A LOT
MOBILITY SCORE: 16
STANDING UP FROM CHAIR USING ARMS: A LOT
DAILY ACTIVITIY SCORE: 18
DRESSING REGULAR UPPER BODY CLOTHING: A LITTLE
MOVING FROM LYING ON BACK TO SITTING ON SIDE OF FLAT BED WITH BEDRAILS: A LITTLE
MOVING FROM LYING ON BACK TO SITTING ON SIDE OF FLAT BED WITH BEDRAILS: A LITTLE
TOILETING: A LITTLE
WALKING IN HOSPITAL ROOM: A LOT
EATING MEALS: A LITTLE
MOVING TO AND FROM BED TO CHAIR: A LITTLE
STANDING UP FROM CHAIR USING ARMS: A LITTLE
PERSONAL GROOMING: A LITTLE
TOILETING: A LITTLE
DRESSING REGULAR LOWER BODY CLOTHING: A LITTLE
PERSONAL GROOMING: A LITTLE
STANDING UP FROM CHAIR USING ARMS: A LITTLE
DAILY ACTIVITIY SCORE: 14
TURNING FROM BACK TO SIDE WHILE IN FLAT BAD: A LITTLE
DRESSING REGULAR UPPER BODY CLOTHING: A LITTLE
TURNING FROM BACK TO SIDE WHILE IN FLAT BAD: A LITTLE
MOBILITY SCORE: 11
TOILETING: A LITTLE
DAILY ACTIVITIY SCORE: 19
HELP NEEDED FOR BATHING: A LOT
WALKING IN HOSPITAL ROOM: A LOT
TURNING FROM BACK TO SIDE WHILE IN FLAT BAD: A LITTLE
CLIMB 3 TO 5 STEPS WITH RAILING: A LOT
DRESSING REGULAR UPPER BODY CLOTHING: A LITTLE
MOVING FROM LYING ON BACK TO SITTING ON SIDE OF FLAT BED WITH BEDRAILS: A LITTLE
EATING MEALS: A LITTLE
DAILY ACTIVITIY SCORE: 19
WALKING IN HOSPITAL ROOM: A LOT
MOVING TO AND FROM BED TO CHAIR: A LOT

## 2025-08-26 ASSESSMENT — PAIN - FUNCTIONAL ASSESSMENT
PAIN_FUNCTIONAL_ASSESSMENT: 0-10

## 2025-08-26 ASSESSMENT — LIFESTYLE VARIABLES
HOW OFTEN DO YOU HAVE A DRINK CONTAINING ALCOHOL: NEVER
SKIP TO QUESTIONS 9-10: 1
HOW MANY STANDARD DRINKS CONTAINING ALCOHOL DO YOU HAVE ON A TYPICAL DAY: PATIENT DOES NOT DRINK
HOW OFTEN DO YOU HAVE 6 OR MORE DRINKS ON ONE OCCASION: NEVER
AUDIT-C TOTAL SCORE: 0
AUDIT-C TOTAL SCORE: 0

## 2025-08-26 ASSESSMENT — PAIN DESCRIPTION - LOCATION: LOCATION: HEAD

## 2025-08-26 ASSESSMENT — ENCOUNTER SYMPTOMS
WEAKNESS: 1
ARTHRALGIAS: 0
CONFUSION: 0
FATIGUE: 1
DIFFICULTY URINATING: 0

## 2025-08-26 ASSESSMENT — PATIENT HEALTH QUESTIONNAIRE - PHQ9
1. LITTLE INTEREST OR PLEASURE IN DOING THINGS: SEVERAL DAYS
2. FEELING DOWN, DEPRESSED OR HOPELESS: NOT AT ALL
SUM OF ALL RESPONSES TO PHQ9 QUESTIONS 1 & 2: 1

## 2025-08-27 ENCOUNTER — APPOINTMENT (OUTPATIENT)
Dept: DIALYSIS | Facility: HOSPITAL | Age: 80
End: 2025-08-27
Payer: MEDICARE

## 2025-08-27 ASSESSMENT — PAIN SCALES - GENERAL
PAINLEVEL_OUTOF10: 0 - NO PAIN
PAINLEVEL_OUTOF10: 0 - NO PAIN

## 2025-08-27 ASSESSMENT — COGNITIVE AND FUNCTIONAL STATUS - GENERAL
PERSONAL GROOMING: A LITTLE
MOBILITY SCORE: 18
STANDING UP FROM CHAIR USING ARMS: A LITTLE
WALKING IN HOSPITAL ROOM: A LITTLE
DAILY ACTIVITIY SCORE: 19
DRESSING REGULAR UPPER BODY CLOTHING: A LITTLE
DRESSING REGULAR LOWER BODY CLOTHING: A LITTLE
MOVING TO AND FROM BED TO CHAIR: A LITTLE
TURNING FROM BACK TO SIDE WHILE IN FLAT BAD: A LITTLE
TOILETING: A LITTLE
CLIMB 3 TO 5 STEPS WITH RAILING: A LOT
HELP NEEDED FOR BATHING: A LITTLE

## 2025-08-27 ASSESSMENT — PAIN - FUNCTIONAL ASSESSMENT
PAIN_FUNCTIONAL_ASSESSMENT: 0-10
PAIN_FUNCTIONAL_ASSESSMENT: 0-10

## 2025-08-28 ENCOUNTER — APPOINTMENT (OUTPATIENT)
Dept: INFUSION THERAPY | Facility: HOSPITAL | Age: 80
End: 2025-08-28
Payer: MEDICARE

## 2025-08-28 ASSESSMENT — COGNITIVE AND FUNCTIONAL STATUS - GENERAL
CLIMB 3 TO 5 STEPS WITH RAILING: A LOT
TURNING FROM BACK TO SIDE WHILE IN FLAT BAD: A LITTLE
HELP NEEDED FOR BATHING: A LITTLE
MOVING TO AND FROM BED TO CHAIR: A LITTLE
DAILY ACTIVITIY SCORE: 19
TURNING FROM BACK TO SIDE WHILE IN FLAT BAD: A LITTLE
HELP NEEDED FOR BATHING: A LOT
TOILETING: A LOT
MOVING FROM LYING ON BACK TO SITTING ON SIDE OF FLAT BED WITH BEDRAILS: A LITTLE
DRESSING REGULAR UPPER BODY CLOTHING: A LITTLE
MOBILITY SCORE: 17
DAILY ACTIVITIY SCORE: 15
TOILETING: A LITTLE
PERSONAL GROOMING: A LITTLE
CLIMB 3 TO 5 STEPS WITH RAILING: A LOT
STANDING UP FROM CHAIR USING ARMS: A LITTLE
DRESSING REGULAR LOWER BODY CLOTHING: A LOT
MOBILITY SCORE: 18
MOVING TO AND FROM BED TO CHAIR: A LITTLE
PERSONAL GROOMING: A LITTLE
STANDING UP FROM CHAIR USING ARMS: A LITTLE
EATING MEALS: A LITTLE
WALKING IN HOSPITAL ROOM: A LITTLE
DRESSING REGULAR UPPER BODY CLOTHING: A LITTLE
WALKING IN HOSPITAL ROOM: A LITTLE
DRESSING REGULAR LOWER BODY CLOTHING: A LITTLE

## 2025-08-28 ASSESSMENT — ACTIVITIES OF DAILY LIVING (ADL): HOME_MANAGEMENT_TIME_ENTRY: 12

## 2025-08-28 ASSESSMENT — PAIN - FUNCTIONAL ASSESSMENT
PAIN_FUNCTIONAL_ASSESSMENT: 0-10
PAIN_FUNCTIONAL_ASSESSMENT: 0-10

## 2025-08-28 ASSESSMENT — PAIN SCALES - GENERAL
PAINLEVEL_OUTOF10: 0 - NO PAIN

## 2025-08-29 ENCOUNTER — APPOINTMENT (OUTPATIENT)
Dept: DIALYSIS | Facility: HOSPITAL | Age: 80
End: 2025-08-29
Payer: MEDICARE

## 2025-08-29 PROBLEM — E87.6 HYPOKALEMIA: Status: RESOLVED | Noted: 2024-08-24 | Resolved: 2025-08-29

## 2025-08-29 PROBLEM — N39.0 URINARY TRACT INFECTION WITHOUT HEMATURIA, SITE UNSPECIFIED: Status: RESOLVED | Noted: 2025-08-25 | Resolved: 2025-08-29

## 2025-08-29 ASSESSMENT — COGNITIVE AND FUNCTIONAL STATUS - GENERAL
TOILETING: A LITTLE
MOBILITY SCORE: 19
PERSONAL GROOMING: A LITTLE
WALKING IN HOSPITAL ROOM: A LITTLE
DAILY ACTIVITIY SCORE: 19
DRESSING REGULAR UPPER BODY CLOTHING: A LITTLE
STANDING UP FROM CHAIR USING ARMS: A LITTLE
CLIMB 3 TO 5 STEPS WITH RAILING: A LITTLE
TURNING FROM BACK TO SIDE WHILE IN FLAT BAD: A LITTLE
HELP NEEDED FOR BATHING: A LITTLE
MOVING TO AND FROM BED TO CHAIR: A LITTLE
DRESSING REGULAR LOWER BODY CLOTHING: A LITTLE

## 2025-08-29 ASSESSMENT — PAIN SCALES - GENERAL
PAINLEVEL_OUTOF10: 7
PAINLEVEL_OUTOF10: 0 - NO PAIN
PAINLEVEL_OUTOF10: 7

## 2025-08-29 ASSESSMENT — PAIN - FUNCTIONAL ASSESSMENT
PAIN_FUNCTIONAL_ASSESSMENT: 0-10
PAIN_FUNCTIONAL_ASSESSMENT: 0-10

## 2025-09-02 ENCOUNTER — OFFICE VISIT (OUTPATIENT)
Dept: HEMATOLOGY/ONCOLOGY | Facility: CLINIC | Age: 80
End: 2025-09-02
Payer: MEDICARE

## 2025-09-02 ENCOUNTER — LAB (OUTPATIENT)
Dept: LAB | Facility: HOSPITAL | Age: 80
End: 2025-09-02
Payer: MEDICARE

## 2025-09-02 VITALS
DIASTOLIC BLOOD PRESSURE: 55 MMHG | TEMPERATURE: 98.4 F | OXYGEN SATURATION: 92 % | RESPIRATION RATE: 16 BRPM | HEART RATE: 77 BPM | HEIGHT: 61 IN | BODY MASS INDEX: 30.53 KG/M2 | SYSTOLIC BLOOD PRESSURE: 107 MMHG

## 2025-09-02 DIAGNOSIS — D50.9 IRON DEFICIENCY ANEMIA, UNSPECIFIED IRON DEFICIENCY ANEMIA TYPE: ICD-10-CM

## 2025-09-02 DIAGNOSIS — D64.9 ANEMIA, UNSPECIFIED TYPE: ICD-10-CM

## 2025-09-02 DIAGNOSIS — D46.9 MYELODYSPLASTIC SYNDROME (MULTI): ICD-10-CM

## 2025-09-02 DIAGNOSIS — D63.1 ANEMIA DUE TO CHRONIC KIDNEY DISEASE, UNSPECIFIED CKD STAGE: ICD-10-CM

## 2025-09-02 DIAGNOSIS — N18.9 ANEMIA DUE TO CHRONIC KIDNEY DISEASE, UNSPECIFIED CKD STAGE: ICD-10-CM

## 2025-09-02 LAB
BASOPHILS # BLD AUTO: 0.03 X10*3/UL (ref 0–0.1)
BASOPHILS NFR BLD AUTO: 0.9 %
EOSINOPHIL # BLD AUTO: 0.13 X10*3/UL (ref 0–0.4)
EOSINOPHIL NFR BLD AUTO: 4 %
ERYTHROCYTE [DISTWIDTH] IN BLOOD BY AUTOMATED COUNT: 20.3 % (ref 11.5–14.5)
HCT VFR BLD AUTO: 26.4 % (ref 36–46)
HGB BLD-MCNC: 8.4 G/DL (ref 12–16)
IMM GRANULOCYTES # BLD AUTO: 0.01 X10*3/UL (ref 0–0.5)
IMM GRANULOCYTES NFR BLD AUTO: 0.3 % (ref 0–0.9)
LYMPHOCYTES # BLD AUTO: 0.92 X10*3/UL (ref 0.8–3)
LYMPHOCYTES NFR BLD AUTO: 28 %
MCH RBC QN AUTO: 30.2 PG (ref 26–34)
MCHC RBC AUTO-ENTMCNC: 31.8 G/DL (ref 32–36)
MCV RBC AUTO: 95 FL (ref 80–100)
MONOCYTES # BLD AUTO: 0.38 X10*3/UL (ref 0.05–0.8)
MONOCYTES NFR BLD AUTO: 11.6 %
NEUTROPHILS # BLD AUTO: 1.81 X10*3/UL (ref 1.6–5.5)
NEUTROPHILS NFR BLD AUTO: 55.2 %
NRBC BLD-RTO: ABNORMAL /100{WBCS}
PLATELET # BLD AUTO: 183 X10*3/UL (ref 150–450)
RBC # BLD AUTO: 2.78 X10*6/UL (ref 4–5.2)
WBC # BLD AUTO: 3.3 X10*3/UL (ref 4.4–11.3)

## 2025-09-02 PROCEDURE — 99213 OFFICE O/P EST LOW 20 MIN: CPT | Performed by: INTERNAL MEDICINE

## 2025-09-02 PROCEDURE — 3078F DIAST BP <80 MM HG: CPT | Performed by: INTERNAL MEDICINE

## 2025-09-02 PROCEDURE — 1123F ACP DISCUSS/DSCN MKR DOCD: CPT | Performed by: INTERNAL MEDICINE

## 2025-09-02 PROCEDURE — 85025 COMPLETE CBC W/AUTO DIFF WBC: CPT

## 2025-09-02 PROCEDURE — 1126F AMNT PAIN NOTED NONE PRSNT: CPT | Performed by: INTERNAL MEDICINE

## 2025-09-02 PROCEDURE — 1159F MED LIST DOCD IN RCRD: CPT | Performed by: INTERNAL MEDICINE

## 2025-09-02 PROCEDURE — 1111F DSCHRG MED/CURRENT MED MERGE: CPT | Performed by: INTERNAL MEDICINE

## 2025-09-02 PROCEDURE — 3074F SYST BP LT 130 MM HG: CPT | Performed by: INTERNAL MEDICINE

## 2025-09-02 PROCEDURE — 1160F RVW MEDS BY RX/DR IN RCRD: CPT | Performed by: INTERNAL MEDICINE

## 2025-09-02 PROCEDURE — 36415 COLL VENOUS BLD VENIPUNCTURE: CPT

## 2025-09-02 ASSESSMENT — PAIN SCALES - GENERAL: PAINLEVEL_OUTOF10: 0-NO PAIN

## 2025-09-03 ENCOUNTER — TELEPHONE (OUTPATIENT)
Dept: PRIMARY CARE | Facility: CLINIC | Age: 80
End: 2025-09-03
Payer: MEDICARE

## 2025-09-03 ENCOUNTER — PATIENT OUTREACH (OUTPATIENT)
Dept: PRIMARY CARE | Facility: CLINIC | Age: 80
End: 2025-09-03
Payer: MEDICARE

## 2025-09-04 ENCOUNTER — APPOINTMENT (OUTPATIENT)
Dept: INFUSION THERAPY | Facility: HOSPITAL | Age: 80
End: 2025-09-04
Payer: MEDICARE

## 2025-09-08 ENCOUNTER — APPOINTMENT (OUTPATIENT)
Dept: PRIMARY CARE | Facility: CLINIC | Age: 80
End: 2025-09-08
Payer: MEDICARE

## 2025-09-30 ENCOUNTER — APPOINTMENT (OUTPATIENT)
Dept: CARDIOLOGY | Facility: CLINIC | Age: 80
End: 2025-09-30
Payer: MEDICARE

## 2025-11-13 ENCOUNTER — APPOINTMENT (OUTPATIENT)
Dept: PRIMARY CARE | Facility: CLINIC | Age: 80
End: 2025-11-13
Payer: MEDICARE

## (undated) DEVICE — HEMOSTAT, ARISTA, ABSORBABLE, 3 GRAMS

## (undated) DEVICE — SOLUTION, IRRIGATION, USP, SODIUM CHLORIDE 0.9%, 3000 ML

## (undated) DEVICE — BINDER, ABDOMINAL, 3 PANEL, 9 X 30-45 IN, SM/MED

## (undated) DEVICE — TRAY, SURESTEP, URINE METER, 16FR, COMPLETE, W/STATLOCK

## (undated) DEVICE — ELECTRODE, ELECTROSURGICAL, LAPAROSCOPIC, L HOOK TIP, 36 IN

## (undated) DEVICE — SYSTEM, SMOKE EVAC, SEECLEAR XCL, 8.0 LITER, LF

## (undated) DEVICE — CABLE, ELECTROSURGICAL, MONOPOLAR, LAPAROSCOPIC, 10 FT, LF

## (undated) DEVICE — TROCAR, BLUNT TIP, KII, W/SEAL, 12MM X 100MM

## (undated) DEVICE — EVACUATOR, WOUND, SUCTION, CLOSED, JACKSON-PRATT, 100 CC, SILICONE

## (undated) DEVICE — RETRIEVAL SYSTEM, MONARCH, 10MM DISP ENDOSCOPIC

## (undated) DEVICE — SUTURE, MONOCRYL, 4-0, 18 IN, PS2, UNDYED

## (undated) DEVICE — PAD, GROUNDING, ELECTROSURGICAL, W/9 FT CABLE, POLYHESIVE II, ADULT, LF

## (undated) DEVICE — DEVICE, FORCE TRIVERSE ELECTROSURGICAL

## (undated) DEVICE — 18MM, DEROYAL, LAPAROSCOPIC DISSECTOR, 5MM KITTNER TIP, 45CM SHAFT, STERILE

## (undated) DEVICE — SUTURE, VICRYL, 0, 27 IN, UR-6, VIOLET

## (undated) DEVICE — COVER HANDLE LIGHT, STERIS, BLUE, STERILE

## (undated) DEVICE — APPLICATOR, CHLORAPREP, W/ORANGE TINT, 26ML

## (undated) DEVICE — TROCAR, KII OPTICAL BLADELESS 5MM Z THREAD 100MM LNGTH

## (undated) DEVICE — TROCAR, OPTICAL, BLADELESS, 12MM, THREADED, 100MM LENGTH

## (undated) DEVICE — SOLUTION, IRRIGATION, SODIUM CHLORIDE 0.9%, 1000 ML, POUR BOTTLE

## (undated) DEVICE — DRAIN, WOUND, FLAT, HUBLESS, 0.75 PERFORATION, 10 MM X 20 CM, SILICONE

## (undated) DEVICE — SCISSOR TIP, ENDOCUT, LAPAROSCOPIC

## (undated) DEVICE — SPONGE RADIOPAQUE 4 X 4 7317

## (undated) DEVICE — SUTURE, SILK, 3-0, 30 IN, BR SH, BLACK

## (undated) DEVICE — GLOVE, SURGICAL, PROTEXIS PI BLUE W/NEUTHERA, 7.5, PF, LF

## (undated) DEVICE — SUTURE, ETHILON, 2-0, 18 IN, FS, BLACK, BX/12

## (undated) DEVICE — SCOPE WARMER, LAPAROSCOPE, BAG ONLY, LF

## (undated) DEVICE — LIGASURE, SEALER/DIVIDER MARYLAND JAW, 5MM

## (undated) DEVICE — Device

## (undated) DEVICE — ASSEMBLY, STRYKER FLOW 2, SUCTION IRRIGATOR, WITH TIP

## (undated) DEVICE — TROCAR, KII OPTICAL SEPARATOR, 8MM X 100MM,  Z-THREAD

## (undated) DEVICE — ADHESIVE, SKIN, LIQUIBAND EXCEED

## (undated) DEVICE — SLEEVE, KII, Z-THREAD, 5X100CM

## (undated) DEVICE — APPLICATOR, ARISTA, FLEXITIP, XL, LF

## (undated) DEVICE — SPONGE, HEMOSTATIC, CELLULOSE, SURGICEL, 4 X 8 IN

## (undated) DEVICE — CLIP, ENDO APPLIER LIGAMAX 5MM